# Patient Record
Sex: MALE | Race: WHITE | NOT HISPANIC OR LATINO | Employment: UNEMPLOYED | URBAN - NONMETROPOLITAN AREA
[De-identification: names, ages, dates, MRNs, and addresses within clinical notes are randomized per-mention and may not be internally consistent; named-entity substitution may affect disease eponyms.]

---

## 2022-05-10 ENCOUNTER — ANESTHESIA EVENT (OUTPATIENT)
Dept: PERIOP | Facility: HOSPITAL | Age: 57
End: 2022-05-10

## 2022-05-10 ENCOUNTER — APPOINTMENT (OUTPATIENT)
Dept: CT IMAGING | Facility: HOSPITAL | Age: 57
End: 2022-05-10

## 2022-05-10 ENCOUNTER — APPOINTMENT (OUTPATIENT)
Dept: GENERAL RADIOLOGY | Facility: HOSPITAL | Age: 57
End: 2022-05-10

## 2022-05-10 ENCOUNTER — ANESTHESIA (OUTPATIENT)
Dept: PERIOP | Facility: HOSPITAL | Age: 57
End: 2022-05-10

## 2022-05-10 ENCOUNTER — APPOINTMENT (OUTPATIENT)
Dept: ULTRASOUND IMAGING | Facility: HOSPITAL | Age: 57
End: 2022-05-10

## 2022-05-10 ENCOUNTER — HOSPITAL ENCOUNTER (INPATIENT)
Facility: HOSPITAL | Age: 57
LOS: 23 days | Discharge: NURSING, REHAB OR LONG-TERM CARE | End: 2022-06-02
Attending: STUDENT IN AN ORGANIZED HEALTH CARE EDUCATION/TRAINING PROGRAM | Admitting: PODIATRIST

## 2022-05-10 DIAGNOSIS — M86.072 ACUTE HEMATOGENOUS OSTEOMYELITIS OF LEFT FOOT: Primary | ICD-10-CM

## 2022-05-10 DIAGNOSIS — L03.119 CELLULITIS IN DIABETIC FOOT: ICD-10-CM

## 2022-05-10 DIAGNOSIS — E11.01 TYPE 2 DIABETES MELLITUS WITH HYPEROSMOLAR COMA, WITH LONG-TERM CURRENT USE OF INSULIN: ICD-10-CM

## 2022-05-10 DIAGNOSIS — Z79.4 TYPE 2 DIABETES MELLITUS WITH HYPEROSMOLAR COMA, WITH LONG-TERM CURRENT USE OF INSULIN: ICD-10-CM

## 2022-05-10 DIAGNOSIS — A48.0 GAS GANGRENE OF FOOT: ICD-10-CM

## 2022-05-10 DIAGNOSIS — E66.01 CLASS 2 SEVERE OBESITY DUE TO EXCESS CALORIES WITH SERIOUS COMORBIDITY AND BODY MASS INDEX (BMI) OF 36.0 TO 36.9 IN ADULT: ICD-10-CM

## 2022-05-10 DIAGNOSIS — E11.628 CELLULITIS IN DIABETIC FOOT: ICD-10-CM

## 2022-05-10 DIAGNOSIS — M86.172 OTHER ACUTE OSTEOMYELITIS OF LEFT FOOT: ICD-10-CM

## 2022-05-10 PROBLEM — E66.9 OBESITY: Status: ACTIVE | Noted: 2017-07-24

## 2022-05-10 PROBLEM — E11.9 TYPE 2 DIABETES MELLITUS: Status: ACTIVE | Noted: 2017-07-24

## 2022-05-10 PROBLEM — E78.5 HYPERLIPIDEMIA: Status: ACTIVE | Noted: 2017-07-24

## 2022-05-10 PROBLEM — I10 HYPERTENSIVE DISORDER: Status: ACTIVE | Noted: 2017-07-24

## 2022-05-10 PROBLEM — N28.9 KIDNEY DISEASE: Status: ACTIVE | Noted: 2017-07-24

## 2022-05-10 LAB
ALBUMIN SERPL-MCNC: 2.82 G/DL (ref 3.5–5.2)
ALBUMIN/GLOB SERPL: 0.6 G/DL
ALP SERPL-CCNC: 345 U/L (ref 39–117)
ALT SERPL W P-5'-P-CCNC: 36 U/L (ref 1–41)
ANION GAP SERPL CALCULATED.3IONS-SCNC: 17.1 MMOL/L (ref 5–15)
AST SERPL-CCNC: 50 U/L (ref 1–40)
BASOPHILS # BLD AUTO: 0.03 10*3/MM3 (ref 0–0.2)
BASOPHILS NFR BLD AUTO: 0.2 % (ref 0–1.5)
BILIRUB SERPL-MCNC: 1.1 MG/DL (ref 0–1.2)
BUN SERPL-MCNC: 21 MG/DL (ref 6–20)
BUN/CREAT SERPL: 21.9 (ref 7–25)
CALCIUM SPEC-SCNC: 8.7 MG/DL (ref 8.6–10.5)
CHLORIDE SERPL-SCNC: 88 MMOL/L (ref 98–107)
CO2 SERPL-SCNC: 21.9 MMOL/L (ref 22–29)
CREAT SERPL-MCNC: 0.96 MG/DL (ref 0.76–1.27)
CRP SERPL-MCNC: 21.83 MG/DL (ref 0–0.5)
D-LACTATE SERPL-SCNC: 1.3 MMOL/L (ref 0.5–2)
D-LACTATE SERPL-SCNC: 2.7 MMOL/L (ref 0.5–2)
D-LACTATE SERPL-SCNC: 3 MMOL/L (ref 0.5–2)
DEPRECATED RDW RBC AUTO: 40.7 FL (ref 37–54)
EGFRCR SERPLBLD CKD-EPI 2021: 92.2 ML/MIN/1.73
EOSINOPHIL # BLD AUTO: 0.04 10*3/MM3 (ref 0–0.4)
EOSINOPHIL NFR BLD AUTO: 0.3 % (ref 0.3–6.2)
ERYTHROCYTE [DISTWIDTH] IN BLOOD BY AUTOMATED COUNT: 14.8 % (ref 12.3–15.4)
ERYTHROCYTE [SEDIMENTATION RATE] IN BLOOD: 120 MM/HR (ref 0–20)
FLUAV SUBTYP SPEC NAA+PROBE: NOT DETECTED
FLUBV RNA ISLT QL NAA+PROBE: NOT DETECTED
GLOBULIN UR ELPH-MCNC: 4.8 GM/DL
GLUCOSE BLDC GLUCOMTR-MCNC: 311 MG/DL (ref 70–130)
GLUCOSE BLDC GLUCOMTR-MCNC: 325 MG/DL (ref 70–130)
GLUCOSE SERPL-MCNC: 451 MG/DL (ref 65–99)
HBA1C MFR BLD: 13 % (ref 4.8–5.6)
HCT VFR BLD AUTO: 37.7 % (ref 37.5–51)
HGB BLD-MCNC: 12.3 G/DL (ref 13–17.7)
IMM GRANULOCYTES # BLD AUTO: 0.13 10*3/MM3 (ref 0–0.05)
IMM GRANULOCYTES NFR BLD AUTO: 0.9 % (ref 0–0.5)
LYMPHOCYTES # BLD AUTO: 0.67 10*3/MM3 (ref 0.7–3.1)
LYMPHOCYTES NFR BLD AUTO: 4.4 % (ref 19.6–45.3)
MCH RBC QN AUTO: 25.1 PG (ref 26.6–33)
MCHC RBC AUTO-ENTMCNC: 32.6 G/DL (ref 31.5–35.7)
MCV RBC AUTO: 76.8 FL (ref 79–97)
MONOCYTES # BLD AUTO: 0.56 10*3/MM3 (ref 0.1–0.9)
MONOCYTES NFR BLD AUTO: 3.7 % (ref 5–12)
NEUTROPHILS NFR BLD AUTO: 13.66 10*3/MM3 (ref 1.7–7)
NEUTROPHILS NFR BLD AUTO: 90.5 % (ref 42.7–76)
NRBC BLD AUTO-RTO: 0 /100 WBC (ref 0–0.2)
PLATELET # BLD AUTO: 163 10*3/MM3 (ref 140–450)
PMV BLD AUTO: 10.3 FL (ref 6–12)
POTASSIUM SERPL-SCNC: 4.7 MMOL/L (ref 3.5–5.2)
PROT SERPL-MCNC: 7.6 G/DL (ref 6–8.5)
RBC # BLD AUTO: 4.91 10*6/MM3 (ref 4.14–5.8)
SARS-COV-2 RNA PNL SPEC NAA+PROBE: NOT DETECTED
SODIUM SERPL-SCNC: 127 MMOL/L (ref 136–145)
WBC NRBC COR # BLD: 15.09 10*3/MM3 (ref 3.4–10.8)

## 2022-05-10 PROCEDURE — 25010000002 FENTANYL CITRATE (PF) 50 MCG/ML SOLUTION: Performed by: NURSE ANESTHETIST, CERTIFIED REGISTERED

## 2022-05-10 PROCEDURE — 82962 GLUCOSE BLOOD TEST: CPT

## 2022-05-10 PROCEDURE — 0 LIDOCAINE 1 % SOLUTION 20 ML VIAL: Performed by: PODIATRIST

## 2022-05-10 PROCEDURE — 25010000002 PIPERACILLIN SOD-TAZOBACTAM PER 1 G: Performed by: PHYSICIAN ASSISTANT

## 2022-05-10 PROCEDURE — 99223 1ST HOSP IP/OBS HIGH 75: CPT | Performed by: PHYSICIAN ASSISTANT

## 2022-05-10 PROCEDURE — 87147 CULTURE TYPE IMMUNOLOGIC: CPT | Performed by: PHYSICIAN ASSISTANT

## 2022-05-10 PROCEDURE — 0J9R0ZZ DRAINAGE OF LEFT FOOT SUBCUTANEOUS TISSUE AND FASCIA, OPEN APPROACH: ICD-10-PCS | Performed by: PODIATRIST

## 2022-05-10 PROCEDURE — 99284 EMERGENCY DEPT VISIT MOD MDM: CPT

## 2022-05-10 PROCEDURE — 87147 CULTURE TYPE IMMUNOLOGIC: CPT | Performed by: PODIATRIST

## 2022-05-10 PROCEDURE — 93926 LOWER EXTREMITY STUDY: CPT

## 2022-05-10 PROCEDURE — 25010000002 VANCOMYCIN 1 G RECONSTITUTED SOLUTION: Performed by: PODIATRIST

## 2022-05-10 PROCEDURE — 87205 SMEAR GRAM STAIN: CPT | Performed by: PODIATRIST

## 2022-05-10 PROCEDURE — 25010000002 IOPAMIDOL 61 % SOLUTION: Performed by: STUDENT IN AN ORGANIZED HEALTH CARE EDUCATION/TRAINING PROGRAM

## 2022-05-10 PROCEDURE — 80053 COMPREHEN METABOLIC PANEL: CPT | Performed by: PHYSICIAN ASSISTANT

## 2022-05-10 PROCEDURE — 86140 C-REACTIVE PROTEIN: CPT | Performed by: PHYSICIAN ASSISTANT

## 2022-05-10 PROCEDURE — 83036 HEMOGLOBIN GLYCOSYLATED A1C: CPT | Performed by: HOSPITALIST

## 2022-05-10 PROCEDURE — 73630 X-RAY EXAM OF FOOT: CPT | Performed by: RADIOLOGY

## 2022-05-10 PROCEDURE — 73630 X-RAY EXAM OF FOOT: CPT

## 2022-05-10 PROCEDURE — 87186 SC STD MICRODIL/AGAR DIL: CPT | Performed by: PHYSICIAN ASSISTANT

## 2022-05-10 PROCEDURE — 87150 DNA/RNA AMPLIFIED PROBE: CPT | Performed by: PHYSICIAN ASSISTANT

## 2022-05-10 PROCEDURE — 85652 RBC SED RATE AUTOMATED: CPT | Performed by: PHYSICIAN ASSISTANT

## 2022-05-10 PROCEDURE — 63710000001 INSULIN REGULAR HUMAN PER 5 UNITS: Performed by: PHYSICIAN ASSISTANT

## 2022-05-10 PROCEDURE — 87070 CULTURE OTHR SPECIMN AEROBIC: CPT | Performed by: PODIATRIST

## 2022-05-10 PROCEDURE — 83605 ASSAY OF LACTIC ACID: CPT | Performed by: PHYSICIAN ASSISTANT

## 2022-05-10 PROCEDURE — 85025 COMPLETE CBC W/AUTO DIFF WBC: CPT | Performed by: PHYSICIAN ASSISTANT

## 2022-05-10 PROCEDURE — 25010000002 VANCOMYCIN 5 G RECONSTITUTED SOLUTION: Performed by: PHYSICIAN ASSISTANT

## 2022-05-10 PROCEDURE — 87636 SARSCOV2 & INF A&B AMP PRB: CPT | Performed by: PHYSICIAN ASSISTANT

## 2022-05-10 PROCEDURE — 25010000002 MIDAZOLAM PER 1 MG: Performed by: NURSE ANESTHETIST, CERTIFIED REGISTERED

## 2022-05-10 PROCEDURE — 87186 SC STD MICRODIL/AGAR DIL: CPT | Performed by: PODIATRIST

## 2022-05-10 PROCEDURE — 73701 CT LOWER EXTREMITY W/DYE: CPT

## 2022-05-10 RX ORDER — SODIUM CHLORIDE 9 MG/ML
100 INJECTION, SOLUTION INTRAVENOUS CONTINUOUS
Status: DISCONTINUED | OUTPATIENT
Start: 2022-05-11 | End: 2022-05-11

## 2022-05-10 RX ORDER — NICOTINE POLACRILEX 4 MG
15 LOZENGE BUCCAL
Status: DISCONTINUED | OUTPATIENT
Start: 2022-05-10 | End: 2022-05-29

## 2022-05-10 RX ORDER — HEPARIN SODIUM 5000 [USP'U]/ML
5000 INJECTION, SOLUTION INTRAVENOUS; SUBCUTANEOUS EVERY 12 HOURS SCHEDULED
Status: DISCONTINUED | OUTPATIENT
Start: 2022-05-11 | End: 2022-05-14

## 2022-05-10 RX ORDER — SODIUM CHLORIDE, SODIUM LACTATE, POTASSIUM CHLORIDE, CALCIUM CHLORIDE 600; 310; 30; 20 MG/100ML; MG/100ML; MG/100ML; MG/100ML
100 INJECTION, SOLUTION INTRAVENOUS ONCE AS NEEDED
Status: DISCONTINUED | OUTPATIENT
Start: 2022-05-10 | End: 2022-05-10 | Stop reason: HOSPADM

## 2022-05-10 RX ORDER — FENTANYL CITRATE 50 UG/ML
INJECTION, SOLUTION INTRAMUSCULAR; INTRAVENOUS AS NEEDED
Status: DISCONTINUED | OUTPATIENT
Start: 2022-05-10 | End: 2022-05-10 | Stop reason: SURG

## 2022-05-10 RX ORDER — HYDROCHLOROTHIAZIDE 12.5 MG/1
12.5 TABLET ORAL DAILY PRN
COMMUNITY
End: 2022-06-16 | Stop reason: HOSPADM

## 2022-05-10 RX ORDER — SODIUM CHLORIDE 0.9 % (FLUSH) 0.9 %
10 SYRINGE (ML) INJECTION EVERY 12 HOURS SCHEDULED
Status: DISCONTINUED | OUTPATIENT
Start: 2022-05-11 | End: 2022-06-02 | Stop reason: HOSPADM

## 2022-05-10 RX ORDER — DEXTROSE MONOHYDRATE 25 G/50ML
25 INJECTION, SOLUTION INTRAVENOUS
Status: DISCONTINUED | OUTPATIENT
Start: 2022-05-10 | End: 2022-05-29

## 2022-05-10 RX ORDER — SODIUM CHLORIDE 9 MG/ML
INJECTION, SOLUTION INTRAVENOUS CONTINUOUS PRN
Status: DISCONTINUED | OUTPATIENT
Start: 2022-05-10 | End: 2022-05-10 | Stop reason: SURG

## 2022-05-10 RX ORDER — HYDROCHLOROTHIAZIDE 12.5 MG/1
12.5 TABLET ORAL DAILY PRN
Status: CANCELLED | OUTPATIENT
Start: 2022-05-10

## 2022-05-10 RX ORDER — ASPIRIN 81 MG/1
81 TABLET ORAL DAILY
Status: CANCELLED | OUTPATIENT
Start: 2022-05-11

## 2022-05-10 RX ORDER — ONDANSETRON 2 MG/ML
4 INJECTION INTRAMUSCULAR; INTRAVENOUS AS NEEDED
Status: DISCONTINUED | OUTPATIENT
Start: 2022-05-10 | End: 2022-05-10 | Stop reason: HOSPADM

## 2022-05-10 RX ORDER — MIDAZOLAM HYDROCHLORIDE 1 MG/ML
INJECTION INTRAMUSCULAR; INTRAVENOUS AS NEEDED
Status: DISCONTINUED | OUTPATIENT
Start: 2022-05-10 | End: 2022-05-10 | Stop reason: SURG

## 2022-05-10 RX ORDER — MIDAZOLAM HYDROCHLORIDE 1 MG/ML
1 INJECTION INTRAMUSCULAR; INTRAVENOUS
Status: DISCONTINUED | OUTPATIENT
Start: 2022-05-10 | End: 2022-05-10 | Stop reason: HOSPADM

## 2022-05-10 RX ORDER — GLIPIZIDE 5 MG/1
10 TABLET ORAL
Status: CANCELLED | OUTPATIENT
Start: 2022-05-11

## 2022-05-10 RX ORDER — MEPERIDINE HYDROCHLORIDE 25 MG/ML
12.5 INJECTION INTRAMUSCULAR; INTRAVENOUS; SUBCUTANEOUS
Status: DISCONTINUED | OUTPATIENT
Start: 2022-05-10 | End: 2022-05-10 | Stop reason: HOSPADM

## 2022-05-10 RX ORDER — SODIUM CHLORIDE 0.9 % (FLUSH) 0.9 %
10 SYRINGE (ML) INJECTION AS NEEDED
Status: DISCONTINUED | OUTPATIENT
Start: 2022-05-10 | End: 2022-06-02 | Stop reason: HOSPADM

## 2022-05-10 RX ORDER — SODIUM CHLORIDE 0.9 % (FLUSH) 0.9 %
10 SYRINGE (ML) INJECTION EVERY 12 HOURS SCHEDULED
Status: DISCONTINUED | OUTPATIENT
Start: 2022-05-10 | End: 2022-05-10 | Stop reason: HOSPADM

## 2022-05-10 RX ORDER — ASPIRIN 81 MG/1
81 TABLET ORAL DAILY
Status: ON HOLD | COMMUNITY
End: 2022-06-02

## 2022-05-10 RX ORDER — SODIUM CHLORIDE, SODIUM LACTATE, POTASSIUM CHLORIDE, CALCIUM CHLORIDE 600; 310; 30; 20 MG/100ML; MG/100ML; MG/100ML; MG/100ML
125 INJECTION, SOLUTION INTRAVENOUS ONCE
Status: DISCONTINUED | OUTPATIENT
Start: 2022-05-10 | End: 2022-05-10 | Stop reason: HOSPADM

## 2022-05-10 RX ORDER — DIPHENOXYLATE HYDROCHLORIDE AND ATROPINE SULFATE 2.5; .025 MG/1; MG/1
1 TABLET ORAL DAILY
Status: DISCONTINUED | OUTPATIENT
Start: 2022-05-11 | End: 2022-06-02 | Stop reason: HOSPADM

## 2022-05-10 RX ORDER — KETOROLAC TROMETHAMINE 30 MG/ML
30 INJECTION, SOLUTION INTRAMUSCULAR; INTRAVENOUS EVERY 6 HOURS PRN
Status: DISCONTINUED | OUTPATIENT
Start: 2022-05-10 | End: 2022-05-10 | Stop reason: HOSPADM

## 2022-05-10 RX ORDER — LOSARTAN POTASSIUM 50 MG/1
100 TABLET ORAL DAILY
Status: CANCELLED | OUTPATIENT
Start: 2022-05-11

## 2022-05-10 RX ORDER — SODIUM CHLORIDE 0.9 % (FLUSH) 0.9 %
10 SYRINGE (ML) INJECTION AS NEEDED
Status: DISCONTINUED | OUTPATIENT
Start: 2022-05-10 | End: 2022-05-10 | Stop reason: HOSPADM

## 2022-05-10 RX ORDER — ATORVASTATIN CALCIUM 10 MG/1
10 TABLET, FILM COATED ORAL DAILY
Status: DISCONTINUED | OUTPATIENT
Start: 2022-05-11 | End: 2022-05-22

## 2022-05-10 RX ORDER — LOVASTATIN 10 MG/1
10 TABLET ORAL NIGHTLY
COMMUNITY
End: 2022-06-16 | Stop reason: HOSPADM

## 2022-05-10 RX ORDER — IPRATROPIUM BROMIDE AND ALBUTEROL SULFATE 2.5; .5 MG/3ML; MG/3ML
3 SOLUTION RESPIRATORY (INHALATION) ONCE AS NEEDED
Status: DISCONTINUED | OUTPATIENT
Start: 2022-05-10 | End: 2022-05-10 | Stop reason: HOSPADM

## 2022-05-10 RX ORDER — MAGNESIUM HYDROXIDE 1200 MG/15ML
LIQUID ORAL AS NEEDED
Status: DISCONTINUED | OUTPATIENT
Start: 2022-05-10 | End: 2022-05-10 | Stop reason: HOSPADM

## 2022-05-10 RX ORDER — GLIMEPIRIDE 4 MG/1
4 TABLET ORAL 2 TIMES DAILY
COMMUNITY
End: 2022-06-16 | Stop reason: HOSPADM

## 2022-05-10 RX ORDER — DIPHENOXYLATE HYDROCHLORIDE AND ATROPINE SULFATE 2.5; .025 MG/1; MG/1
1 TABLET ORAL DAILY
Status: ON HOLD | COMMUNITY
End: 2022-06-02

## 2022-05-10 RX ORDER — VANCOMYCIN HYDROCHLORIDE 1 G/20ML
INJECTION, POWDER, LYOPHILIZED, FOR SOLUTION INTRAVENOUS AS NEEDED
Status: DISCONTINUED | OUTPATIENT
Start: 2022-05-10 | End: 2022-05-10 | Stop reason: HOSPADM

## 2022-05-10 RX ORDER — INSULIN ASPART 100 [IU]/ML
0-14 INJECTION, SOLUTION INTRAVENOUS; SUBCUTANEOUS
Status: DISCONTINUED | OUTPATIENT
Start: 2022-05-11 | End: 2022-05-29

## 2022-05-10 RX ORDER — LOSARTAN POTASSIUM 100 MG/1
100 TABLET ORAL DAILY
Status: ON HOLD | COMMUNITY
End: 2022-06-02

## 2022-05-10 RX ADMIN — FENTANYL CITRATE 100 MCG: 50 INJECTION INTRAMUSCULAR; INTRAVENOUS at 21:19

## 2022-05-10 RX ADMIN — VANCOMYCIN HYDROCHLORIDE 2000 MG: 5 INJECTION, POWDER, LYOPHILIZED, FOR SOLUTION INTRAVENOUS at 19:30

## 2022-05-10 RX ADMIN — IOPAMIDOL 87 ML: 612 INJECTION, SOLUTION INTRAVENOUS at 17:27

## 2022-05-10 RX ADMIN — MIDAZOLAM 2 MG: 1 INJECTION INTRAMUSCULAR; INTRAVENOUS at 21:14

## 2022-05-10 RX ADMIN — PIPERACILLIN SODIUM AND TAZOBACTAM SODIUM 4.5 G: 4; .5 INJECTION, POWDER, LYOPHILIZED, FOR SOLUTION INTRAVENOUS at 18:40

## 2022-05-10 RX ADMIN — MIDAZOLAM 2 MG: 1 INJECTION INTRAMUSCULAR; INTRAVENOUS at 21:01

## 2022-05-10 RX ADMIN — HUMAN INSULIN 5 UNITS: 100 INJECTION, SOLUTION SUBCUTANEOUS at 17:30

## 2022-05-10 RX ADMIN — SODIUM CHLORIDE 2190 ML: 9 INJECTION, SOLUTION INTRAVENOUS at 17:30

## 2022-05-10 RX ADMIN — SODIUM CHLORIDE: 9 INJECTION, SOLUTION INTRAVENOUS at 21:01

## 2022-05-11 PROBLEM — M86.9 OSTEOMYELITIS: Status: ACTIVE | Noted: 2022-05-11

## 2022-05-11 LAB
ACETONE BLD QL: NEGATIVE
ALBUMIN SERPL-MCNC: 2.31 G/DL (ref 3.5–5.2)
ALBUMIN/GLOB SERPL: 0.6 G/DL
ALP SERPL-CCNC: 259 U/L (ref 39–117)
ALT SERPL W P-5'-P-CCNC: 27 U/L (ref 1–41)
AMPHET+METHAMPHET UR QL: NEGATIVE
AMPHETAMINES UR QL: NEGATIVE
ANION GAP SERPL CALCULATED.3IONS-SCNC: 12.6 MMOL/L (ref 5–15)
AST SERPL-CCNC: 35 U/L (ref 1–40)
ATMOSPHERIC PRESS: 731 MMHG
BACTERIA BLD CULT: ABNORMAL
BACTERIA UR QL AUTO: ABNORMAL /HPF
BARBITURATES UR QL SCN: NEGATIVE
BASE EXCESS BLDV CALC-SCNC: 1.5 MMOL/L (ref 0–2)
BASOPHILS # BLD AUTO: 0.02 10*3/MM3 (ref 0–0.2)
BASOPHILS NFR BLD AUTO: 0.2 % (ref 0–1.5)
BDY SITE: ABNORMAL
BENZODIAZ UR QL SCN: NEGATIVE
BILIRUB SERPL-MCNC: 1 MG/DL (ref 0–1.2)
BILIRUB UR QL STRIP: NEGATIVE
BODY TEMPERATURE: 37 C
BOTTLE TYPE: ABNORMAL
BUN SERPL-MCNC: 16 MG/DL (ref 6–20)
BUN/CREAT SERPL: 21.1 (ref 7–25)
BUPRENORPHINE SERPL-MCNC: NEGATIVE NG/ML
CALCIUM SPEC-SCNC: 8.2 MG/DL (ref 8.6–10.5)
CANNABINOIDS SERPL QL: NEGATIVE
CHLORIDE SERPL-SCNC: 97 MMOL/L (ref 98–107)
CLARITY UR: CLEAR
CO2 BLDA-SCNC: 27.4 MMOL/L (ref 22–33)
CO2 SERPL-SCNC: 22.4 MMOL/L (ref 22–29)
COCAINE UR QL: NEGATIVE
COHGB MFR BLD: 1.5 % (ref 0–5)
COLOR UR: YELLOW
CREAT SERPL-MCNC: 0.76 MG/DL (ref 0.76–1.27)
CRP SERPL-MCNC: 18.62 MG/DL (ref 0–0.5)
DEPRECATED RDW RBC AUTO: 41.2 FL (ref 37–54)
EGFRCR SERPLBLD CKD-EPI 2021: 104.8 ML/MIN/1.73
EOSINOPHIL # BLD AUTO: 0.09 10*3/MM3 (ref 0–0.4)
EOSINOPHIL NFR BLD AUTO: 0.7 % (ref 0.3–6.2)
ERYTHROCYTE [DISTWIDTH] IN BLOOD BY AUTOMATED COUNT: 14.8 % (ref 12.3–15.4)
ETHANOL BLD-MCNC: <10 MG/DL (ref 0–10)
ETHANOL UR QL: <0.01 %
FERRITIN SERPL-MCNC: 367.8 NG/ML (ref 30–400)
GLOBULIN UR ELPH-MCNC: 3.9 GM/DL
GLUCOSE BLDC GLUCOMTR-MCNC: 226 MG/DL (ref 70–130)
GLUCOSE BLDC GLUCOMTR-MCNC: 248 MG/DL (ref 70–130)
GLUCOSE BLDC GLUCOMTR-MCNC: 254 MG/DL (ref 70–130)
GLUCOSE BLDC GLUCOMTR-MCNC: 268 MG/DL (ref 70–130)
GLUCOSE BLDC GLUCOMTR-MCNC: 295 MG/DL (ref 70–130)
GLUCOSE SERPL-MCNC: 277 MG/DL (ref 65–99)
GLUCOSE UR STRIP-MCNC: ABNORMAL MG/DL
HAV IGM SERPL QL IA: NORMAL
HBV CORE IGM SERPL QL IA: NORMAL
HBV SURFACE AG SERPL QL IA: NORMAL
HCO3 BLDV-SCNC: 26.1 MMOL/L (ref 22–28)
HCT VFR BLD AUTO: 33.2 % (ref 37.5–51)
HCV AB SER DONR QL: NORMAL
HGB BLD-MCNC: 10.9 G/DL (ref 13–17.7)
HGB BLDA-MCNC: 11.2 G/DL (ref 14–18)
HGB UR QL STRIP.AUTO: ABNORMAL
HYALINE CASTS UR QL AUTO: ABNORMAL /LPF
IMM GRANULOCYTES # BLD AUTO: 0.07 10*3/MM3 (ref 0–0.05)
IMM GRANULOCYTES NFR BLD AUTO: 0.6 % (ref 0–0.5)
INHALED O2 CONCENTRATION: 21 %
IRON 24H UR-MRATE: 60 MCG/DL (ref 59–158)
IRON 24H UR-MRATE: 60 MCG/DL (ref 59–158)
IRON SATN MFR SERPL: 25 % (ref 20–50)
KETONES UR QL STRIP: ABNORMAL
LEUKOCYTE ESTERASE UR QL STRIP.AUTO: NEGATIVE
LYMPHOCYTES # BLD AUTO: 0.85 10*3/MM3 (ref 0.7–3.1)
LYMPHOCYTES NFR BLD AUTO: 7.1 % (ref 19.6–45.3)
Lab: ABNORMAL
MAGNESIUM SERPL-MCNC: 1.6 MG/DL (ref 1.6–2.6)
MCH RBC QN AUTO: 25.3 PG (ref 26.6–33)
MCHC RBC AUTO-ENTMCNC: 32.8 G/DL (ref 31.5–35.7)
MCV RBC AUTO: 77.2 FL (ref 79–97)
METHADONE UR QL SCN: NEGATIVE
METHGB BLD QL: 0.1 % (ref 0–3)
MODALITY: ABNORMAL
MONOCYTES # BLD AUTO: 0.58 10*3/MM3 (ref 0.1–0.9)
MONOCYTES NFR BLD AUTO: 4.8 % (ref 5–12)
NEUTROPHILS NFR BLD AUTO: 10.42 10*3/MM3 (ref 1.7–7)
NEUTROPHILS NFR BLD AUTO: 86.6 % (ref 42.7–76)
NITRITE UR QL STRIP: NEGATIVE
NRBC BLD AUTO-RTO: 0 /100 WBC (ref 0–0.2)
OPIATES UR QL: NEGATIVE
OXYCODONE UR QL SCN: NEGATIVE
OXYHGB MFR BLDV: 52 % (ref 45–75)
PCO2 BLDV: 40.6 MM HG (ref 41–51)
PCP UR QL SCN: NEGATIVE
PH BLDV: 7.42 PH UNITS (ref 7.32–7.42)
PH UR STRIP.AUTO: 5.5 [PH] (ref 5–8)
PLATELET # BLD AUTO: 138 10*3/MM3 (ref 140–450)
PMV BLD AUTO: 9.7 FL (ref 6–12)
PO2 BLDV: 27.9 MM HG (ref 27–53)
POTASSIUM SERPL-SCNC: 4.2 MMOL/L (ref 3.5–5.2)
PROPOXYPH UR QL: NEGATIVE
PROT SERPL-MCNC: 6.2 G/DL (ref 6–8.5)
PROT UR QL STRIP: NEGATIVE
QT INTERVAL: 362 MS
QTC INTERVAL: 476 MS
RBC # BLD AUTO: 4.3 10*6/MM3 (ref 4.14–5.8)
RBC # UR STRIP: ABNORMAL /HPF
REF LAB TEST METHOD: ABNORMAL
SALICYLATES SERPL-MCNC: <0.3 MG/DL
SAO2 % BLDCOV: 52.8 % (ref 45–75)
SODIUM SERPL-SCNC: 132 MMOL/L (ref 136–145)
SP GR UR STRIP: >=1.03 (ref 1–1.03)
SQUAMOUS #/AREA URNS HPF: ABNORMAL /HPF
TIBC SERPL-MCNC: 235 MCG/DL (ref 298–536)
TRANSFERRIN SERPL-MCNC: 158 MG/DL (ref 200–360)
TRICYCLICS UR QL SCN: NEGATIVE
UROBILINOGEN UR QL STRIP: ABNORMAL
VENTILATOR MODE: ABNORMAL
WBC # UR STRIP: ABNORMAL /HPF
WBC NRBC COR # BLD: 12.03 10*3/MM3 (ref 3.4–10.8)

## 2022-05-11 PROCEDURE — 80306 DRUG TEST PRSMV INSTRMNT: CPT | Performed by: PHYSICIAN ASSISTANT

## 2022-05-11 PROCEDURE — 82728 ASSAY OF FERRITIN: CPT | Performed by: PHYSICIAN ASSISTANT

## 2022-05-11 PROCEDURE — 25010000002 PIPERACILLIN SOD-TAZOBACTAM PER 1 G: Performed by: HOSPITALIST

## 2022-05-11 PROCEDURE — 82009 KETONE BODYS QUAL: CPT | Performed by: HOSPITALIST

## 2022-05-11 PROCEDURE — 80179 DRUG ASSAY SALICYLATE: CPT | Performed by: PHYSICIAN ASSISTANT

## 2022-05-11 PROCEDURE — 82962 GLUCOSE BLOOD TEST: CPT

## 2022-05-11 PROCEDURE — 84466 ASSAY OF TRANSFERRIN: CPT | Performed by: PHYSICIAN ASSISTANT

## 2022-05-11 PROCEDURE — 83735 ASSAY OF MAGNESIUM: CPT | Performed by: PHYSICIAN ASSISTANT

## 2022-05-11 PROCEDURE — 82077 ASSAY SPEC XCP UR&BREATH IA: CPT | Performed by: PHYSICIAN ASSISTANT

## 2022-05-11 PROCEDURE — 82607 VITAMIN B-12: CPT | Performed by: PHYSICIAN ASSISTANT

## 2022-05-11 PROCEDURE — 82746 ASSAY OF FOLIC ACID SERUM: CPT | Performed by: PHYSICIAN ASSISTANT

## 2022-05-11 PROCEDURE — 82820 HEMOGLOBIN-OXYGEN AFFINITY: CPT

## 2022-05-11 PROCEDURE — 81001 URINALYSIS AUTO W/SCOPE: CPT | Performed by: HOSPITALIST

## 2022-05-11 PROCEDURE — 83540 ASSAY OF IRON: CPT | Performed by: PHYSICIAN ASSISTANT

## 2022-05-11 PROCEDURE — 82977 ASSAY OF GGT: CPT | Performed by: PHYSICIAN ASSISTANT

## 2022-05-11 PROCEDURE — 25010000002 HEPARIN (PORCINE) PER 1000 UNITS: Performed by: HOSPITALIST

## 2022-05-11 PROCEDURE — 85025 COMPLETE CBC W/AUTO DIFF WBC: CPT | Performed by: HOSPITALIST

## 2022-05-11 PROCEDURE — 63710000001 INSULIN ASPART PER 5 UNITS: Performed by: HOSPITALIST

## 2022-05-11 PROCEDURE — 80074 ACUTE HEPATITIS PANEL: CPT | Performed by: PHYSICIAN ASSISTANT

## 2022-05-11 PROCEDURE — 86140 C-REACTIVE PROTEIN: CPT | Performed by: HOSPITALIST

## 2022-05-11 PROCEDURE — 82805 BLOOD GASES W/O2 SATURATION: CPT

## 2022-05-11 PROCEDURE — 80053 COMPREHEN METABOLIC PANEL: CPT | Performed by: HOSPITALIST

## 2022-05-11 PROCEDURE — 93005 ELECTROCARDIOGRAM TRACING: CPT | Performed by: PHYSICIAN ASSISTANT

## 2022-05-11 PROCEDURE — 25010000002 CEFEPIME PER 500 MG: Performed by: INTERNAL MEDICINE

## 2022-05-11 PROCEDURE — 25010000002 VANCOMYCIN 5 G RECONSTITUTED SOLUTION: Performed by: HOSPITALIST

## 2022-05-11 RX ORDER — INSULIN ASPART 100 [IU]/ML
5 INJECTION, SOLUTION INTRAVENOUS; SUBCUTANEOUS ONCE
Status: DISCONTINUED | OUTPATIENT
Start: 2022-05-11 | End: 2022-05-11

## 2022-05-11 RX ORDER — PANTOPRAZOLE SODIUM 40 MG/1
40 TABLET, DELAYED RELEASE ORAL
Status: DISCONTINUED | OUTPATIENT
Start: 2022-05-11 | End: 2022-06-02 | Stop reason: HOSPADM

## 2022-05-11 RX ORDER — SODIUM HYPOCHLORITE 1.25 MG/ML
SOLUTION TOPICAL 2 TIMES DAILY
Status: DISCONTINUED | OUTPATIENT
Start: 2022-05-11 | End: 2022-05-19

## 2022-05-11 RX ORDER — CLINDAMYCIN PHOSPHATE 900 MG/50ML
900 INJECTION INTRAVENOUS EVERY 8 HOURS
Status: COMPLETED | OUTPATIENT
Start: 2022-05-11 | End: 2022-05-13

## 2022-05-11 RX ORDER — INSULIN ASPART 100 [IU]/ML
10 INJECTION, SOLUTION INTRAVENOUS; SUBCUTANEOUS ONCE
Status: DISCONTINUED | OUTPATIENT
Start: 2022-05-11 | End: 2022-05-11

## 2022-05-11 RX ORDER — HYDROCODONE BITARTRATE AND ACETAMINOPHEN 7.5; 325 MG/1; MG/1
1 TABLET ORAL EVERY 6 HOURS PRN
Status: DISPENSED | OUTPATIENT
Start: 2022-05-11 | End: 2022-05-18

## 2022-05-11 RX ADMIN — ATORVASTATIN CALCIUM 10 MG: 10 TABLET, FILM COATED ORAL at 08:18

## 2022-05-11 RX ADMIN — Medication 1 TABLET: at 08:17

## 2022-05-11 RX ADMIN — HEPARIN SODIUM 5000 UNITS: 5000 INJECTION INTRAVENOUS; SUBCUTANEOUS at 00:39

## 2022-05-11 RX ADMIN — SODIUM CHLORIDE 100 ML/HR: 9 INJECTION, SOLUTION INTRAVENOUS at 00:39

## 2022-05-11 RX ADMIN — HYDROCODONE BITARTRATE AND ACETAMINOPHEN 1 TABLET: 7.5; 325 TABLET ORAL at 00:38

## 2022-05-11 RX ADMIN — PIPERACILLIN SODIUM AND TAZOBACTAM SODIUM 3.38 G: 3; .375 INJECTION, POWDER, LYOPHILIZED, FOR SOLUTION INTRAVENOUS at 00:38

## 2022-05-11 RX ADMIN — VANCOMYCIN HYDROCHLORIDE 1500 MG: 5 INJECTION, POWDER, LYOPHILIZED, FOR SOLUTION INTRAVENOUS at 08:15

## 2022-05-11 RX ADMIN — CEFEPIME HYDROCHLORIDE 2 G: 2 INJECTION, POWDER, FOR SOLUTION INTRAVENOUS at 22:20

## 2022-05-11 RX ADMIN — HEPARIN SODIUM 5000 UNITS: 5000 INJECTION INTRAVENOUS; SUBCUTANEOUS at 08:28

## 2022-05-11 RX ADMIN — CLINDAMYCIN IN 5 PERCENT DEXTROSE 900 MG: 18 INJECTION, SOLUTION INTRAVENOUS at 22:19

## 2022-05-11 RX ADMIN — INSULIN ASPART 8 UNITS: 100 INJECTION, SOLUTION INTRAVENOUS; SUBCUTANEOUS at 12:01

## 2022-05-11 RX ADMIN — PIPERACILLIN SODIUM AND TAZOBACTAM SODIUM 3.38 G: 3; .375 INJECTION, POWDER, LYOPHILIZED, FOR SOLUTION INTRAVENOUS at 10:22

## 2022-05-11 RX ADMIN — INSULIN ASPART 5 UNITS: 100 INJECTION, SOLUTION INTRAVENOUS; SUBCUTANEOUS at 17:28

## 2022-05-11 RX ADMIN — PANTOPRAZOLE SODIUM 40 MG: 40 TABLET, DELAYED RELEASE ORAL at 05:59

## 2022-05-11 RX ADMIN — Medication 10 ML: at 08:20

## 2022-05-11 RX ADMIN — Medication 10 ML: at 00:41

## 2022-05-11 RX ADMIN — HEPARIN SODIUM 5000 UNITS: 5000 INJECTION INTRAVENOUS; SUBCUTANEOUS at 22:19

## 2022-05-11 RX ADMIN — VANCOMYCIN HYDROCHLORIDE 1500 MG: 5 INJECTION, POWDER, LYOPHILIZED, FOR SOLUTION INTRAVENOUS at 23:25

## 2022-05-11 RX ADMIN — Medication 10 ML: at 22:20

## 2022-05-11 RX ADMIN — CLINDAMYCIN IN 5 PERCENT DEXTROSE 900 MG: 18 INJECTION, SOLUTION INTRAVENOUS at 14:43

## 2022-05-11 RX ADMIN — DAKIN'S SOLUTION 0.125% (QUARTER STRENGTH): 0.12 SOLUTION at 22:20

## 2022-05-11 RX ADMIN — CEFEPIME HYDROCHLORIDE 2 G: 2 INJECTION, POWDER, FOR SOLUTION INTRAVENOUS at 13:08

## 2022-05-11 RX ADMIN — INSULIN ASPART 5 UNITS: 100 INJECTION, SOLUTION INTRAVENOUS; SUBCUTANEOUS at 08:15

## 2022-05-11 NOTE — ANESTHESIA PREPROCEDURE EVALUATION
Anesthesia Evaluation     Patient summary reviewed and Nursing notes reviewed   no history of anesthetic complications:  NPO Solid Status: > 8 hours  NPO Liquid Status: > 8 hours           Airway   Dental      Pulmonary - negative pulmonary ROS   Cardiovascular     (+) hypertension, hyperlipidemia,       Neuro/Psych- negative ROS  GI/Hepatic/Renal/Endo    (+) obesity, morbid obesity,  renal disease CRI, diabetes mellitus type 2 poorly controlled,     Musculoskeletal (-) negative ROS    Abdominal    Substance History   (+) alcohol use,      OB/GYN negative ob/gyn ROS         Other - negative ROS                    Results:    Results from last 7 days   Lab Units 05/10/22  1550   WBC 10*3/mm3 15.09*     Lab Results   Component Value Date    NEUTROABS 13.66 (H) 05/10/2022       Results from last 7 days   Lab Units 05/10/22  1550   CREATININE mg/dL 0.96       Results from last 7 days   Lab Units 05/10/22  1550   CRP mg/dL 21.83*       Imaging Results (Last 24 Hours)     ** No results found for the last 24 hours. **              PROBLEM LIST:    Patient Active Problem List   Diagnosis   • Hyperlipidemia   • Hypertensive disorder   • Kidney disease   • Obesity   • Type 2 diabetes mellitus (HCC)   • Gas gangrene of foot (HCC)   • Cellulitis in diabetic foot (HCC)     Per Podiatry consult:   CT changes of gas gangrene/emphysema soft tissue will need emergent I and D with Deep cutlure, drain placement. IV antibiotic broad spectrum until culture returns.Infectious Disease Consult. CBC, CRP and SED, Blood cultures. Will follow. Prognosis gaurded.       Anesthesia Plan    ASA 3 - emergent     general     intravenous induction     Anesthetic plan, all risks, benefits, and alternatives have been provided, discussed and informed consent has been obtained with: patient.  Use of blood products discussed with patient  Consented to blood products.       CODE STATUS:      FULL    Lab Results   Component Value Date    WBC 15.09 (H)  05/10/2022    HGB 12.3 (L) 05/10/2022    HCT 37.7 05/10/2022    MCV 76.8 (L) 05/10/2022     05/10/2022       Lab Results   Component Value Date    GLUCOSE 451 (C) 05/10/2022    BUN 21 (H) 05/10/2022    CREATININE 0.96 05/10/2022    BCR 21.9 05/10/2022    K 4.7 05/10/2022    CO2 21.9 (L) 05/10/2022    CALCIUM 8.7 05/10/2022    ALBUMIN 2.82 (L) 05/10/2022    AST 50 (H) 05/10/2022    ALT 36 05/10/2022

## 2022-05-11 NOTE — ANESTHESIA POSTPROCEDURE EVALUATION
Patient: Melchor Hardwick    Procedure Summary     Date: 05/10/22 Room / Location: [facility] OR 01 /  [facility] OR    Anesthesia Start: 2101 Anesthesia Stop: 2134    Procedure: INCISION AND DRAINAGE LOWER EXTREMITY (Left ) Diagnosis:       Type 2 diabetes mellitus with hyperosmolar coma, with long-term current use of insulin (HCC)      Gas gangrene of foot (HCC)      Cellulitis in diabetic foot (HCC)      (Type 2 diabetes mellitus with hyperosmolar coma, with long-term current use of insulin ([facility]) [E11.01, Z79.4])      (Gas gangrene of foot (HCC) [A48.0])      (Cellulitis in diabetic foot (HCC) [E11.628, L03.119])    Surgeons: Addison Colby MD Provider: Tom Luong MD    Anesthesia Type: general ASA Status: 3 - Emergent          Anesthesia Type: general    Vitals  No vitals data found for the desired time range.          Post Anesthesia Care and Evaluation    Patient location during evaluation: PACU  Patient participation: complete - patient participated  Level of consciousness: awake  Pain score: 0  Pain management: adequate  Airway patency: patent  Anesthetic complications: No anesthetic complications  PONV Status: none  Cardiovascular status: acceptable  Respiratory status: acceptable  Hydration status: acceptable

## 2022-05-11 NOTE — ANESTHESIA PREPROCEDURE EVALUATION
Anesthesia Evaluation     Patient summary reviewed and Nursing notes reviewed   no history of anesthetic complications:  NPO Solid Status: > 8 hours  NPO Liquid Status: > 8 hours           Airway   Mallampati: III  TM distance: >3 FB  Neck ROM: full  Dental    (+) poor dentition    Pulmonary - negative pulmonary ROS and normal exam   Cardiovascular - normal exam    (+) hypertension, hyperlipidemia,       Neuro/Psych- negative ROS  GI/Hepatic/Renal/Endo    (+) obesity, morbid obesity,  renal disease CRI, diabetes mellitus type 2 poorly controlled using insulin,     Musculoskeletal (-) negative ROS    Abdominal   (+) obese,    Substance History - negative use     OB/GYN negative ob/gyn ROS         Other - negative ROS                       Anesthesia Plan    ASA 3 - emergent     general     intravenous induction     Anesthetic plan, all risks, benefits, and alternatives have been provided, discussed and informed consent has been obtained with: patient.  Use of blood products discussed with patient  Consented to blood products.       CODE STATUS:      FULL      Lab Results   Component Value Date    WBC 15.09 (H) 05/10/2022    HGB 12.3 (L) 05/10/2022    HCT 37.7 05/10/2022    MCV 76.8 (L) 05/10/2022     05/10/2022       Lab Results   Component Value Date    GLUCOSE 451 (C) 05/10/2022    BUN 21 (H) 05/10/2022    CREATININE 0.96 05/10/2022    BCR 21.9 05/10/2022    K 4.7 05/10/2022    CO2 21.9 (L) 05/10/2022    CALCIUM 8.7 05/10/2022    ALBUMIN 2.82 (L) 05/10/2022    AST 50 (H) 05/10/2022    ALT 36 05/10/2022

## 2022-05-12 ENCOUNTER — APPOINTMENT (OUTPATIENT)
Dept: CARDIOLOGY | Facility: HOSPITAL | Age: 57
End: 2022-05-12

## 2022-05-12 LAB
ALBUMIN SERPL-MCNC: 1.87 G/DL (ref 3.5–5.2)
ALP SERPL-CCNC: 217 U/L (ref 39–117)
ALT SERPL W P-5'-P-CCNC: 24 U/L (ref 1–41)
ANION GAP SERPL CALCULATED.3IONS-SCNC: 9.7 MMOL/L (ref 5–15)
AST SERPL-CCNC: 32 U/L (ref 1–40)
BH CV ECHO MEAS - ACS: 1.93 CM
BH CV ECHO MEAS - AO MAX PG: 7.3 MMHG
BH CV ECHO MEAS - AO MEAN PG: 5 MMHG
BH CV ECHO MEAS - AO ROOT DIAM: 3.5 CM
BH CV ECHO MEAS - AO V2 MAX: 135 CM/SEC
BH CV ECHO MEAS - AO V2 VTI: 27.8 CM
BH CV ECHO MEAS - EDV(CUBED): 166.4 ML
BH CV ECHO MEAS - EDV(MOD-SP2): 48.8 ML
BH CV ECHO MEAS - EDV(MOD-SP4): 78 ML
BH CV ECHO MEAS - EF(MOD-BP): 57.4 %
BH CV ECHO MEAS - EF(MOD-SP2): 55.3 %
BH CV ECHO MEAS - EF(MOD-SP4): 59.4 %
BH CV ECHO MEAS - ESV(CUBED): 29.8 ML
BH CV ECHO MEAS - ESV(MOD-SP2): 21.8 ML
BH CV ECHO MEAS - ESV(MOD-SP4): 31.7 ML
BH CV ECHO MEAS - FS: 43.6 %
BH CV ECHO MEAS - IVS/LVPW: 1 CM
BH CV ECHO MEAS - IVSD: 0.9 CM
BH CV ECHO MEAS - LA DIMENSION: 5 CM
BH CV ECHO MEAS - LAT PEAK E' VEL: 7.3 CM/SEC
BH CV ECHO MEAS - LV DIASTOLIC VOL/BSA (35-75): 35.7 CM2
BH CV ECHO MEAS - LV MASS(C)D: 185.8 GRAMS
BH CV ECHO MEAS - LV MAX PG: 5.8 MMHG
BH CV ECHO MEAS - LV MEAN PG: 3 MMHG
BH CV ECHO MEAS - LV SYSTOLIC VOL/BSA (12-30): 14.5 CM2
BH CV ECHO MEAS - LV V1 MAX: 120 CM/SEC
BH CV ECHO MEAS - LV V1 VTI: 22.3 CM
BH CV ECHO MEAS - LVIDD: 5.5 CM
BH CV ECHO MEAS - LVIDS: 3.1 CM
BH CV ECHO MEAS - LVPWD: 0.9 CM
BH CV ECHO MEAS - MED PEAK E' VEL: 9 CM/SEC
BH CV ECHO MEAS - MR MAX PG: 51.3 MMHG
BH CV ECHO MEAS - MR MAX VEL: 358 CM/SEC
BH CV ECHO MEAS - MV A MAX VEL: 94.6 CM/SEC
BH CV ECHO MEAS - MV DEC SLOPE: 466 CM/SEC2
BH CV ECHO MEAS - MV DEC TIME: 0.22 MSEC
BH CV ECHO MEAS - MV E MAX VEL: 100 CM/SEC
BH CV ECHO MEAS - MV E/A: 1.06
BH CV ECHO MEAS - MV MAX PG: 4.5 MMHG
BH CV ECHO MEAS - MV MEAN PG: 2 MMHG
BH CV ECHO MEAS - MV V2 VTI: 25.5 CM
BH CV ECHO MEAS - PA ACC TIME: 0.1 SEC
BH CV ECHO MEAS - PA PR(ACCEL): 34.4 MMHG
BH CV ECHO MEAS - PA V2 MAX: 112 CM/SEC
BH CV ECHO MEAS - RAP SYSTOLE: 10 MMHG
BH CV ECHO MEAS - RVSP: 27.5 MMHG
BH CV ECHO MEAS - SI(MOD-SP2): 12.4 ML/M2
BH CV ECHO MEAS - SI(MOD-SP4): 21.2 ML/M2
BH CV ECHO MEAS - SV(MOD-SP2): 27 ML
BH CV ECHO MEAS - SV(MOD-SP4): 46.3 ML
BH CV ECHO MEAS - TAPSE (>1.6): 2.7 CM
BH CV ECHO MEAS - TR MAX PG: 17.5 MMHG
BH CV ECHO MEAS - TR MAX VEL: 209 CM/SEC
BH CV ECHO MEASUREMENTS AVERAGE E/E' RATIO: 12.27
BILIRUB CONJ SERPL-MCNC: 0.3 MG/DL (ref 0–0.3)
BILIRUB INDIRECT SERPL-MCNC: 0.3 MG/DL
BILIRUB SERPL-MCNC: 0.6 MG/DL (ref 0–1.2)
BUN SERPL-MCNC: 14 MG/DL (ref 6–20)
BUN/CREAT SERPL: 22.2 (ref 7–25)
CALCIUM SPEC-SCNC: 7.7 MG/DL (ref 8.6–10.5)
CHLORIDE SERPL-SCNC: 96 MMOL/L (ref 98–107)
CO2 SERPL-SCNC: 20.3 MMOL/L (ref 22–29)
CREAT SERPL-MCNC: 0.63 MG/DL (ref 0.76–1.27)
DEPRECATED RDW RBC AUTO: 41.7 FL (ref 37–54)
EGFRCR SERPLBLD CKD-EPI 2021: 110.9 ML/MIN/1.73
ERYTHROCYTE [DISTWIDTH] IN BLOOD BY AUTOMATED COUNT: 14.7 % (ref 12.3–15.4)
FOLATE SERPL-MCNC: 6.32 NG/ML (ref 4.78–24.2)
GGT SERPL-CCNC: 211 U/L (ref 8–61)
GLUCOSE BLDC GLUCOMTR-MCNC: 207 MG/DL (ref 70–130)
GLUCOSE BLDC GLUCOMTR-MCNC: 272 MG/DL (ref 70–130)
GLUCOSE BLDC GLUCOMTR-MCNC: 289 MG/DL (ref 70–130)
GLUCOSE BLDC GLUCOMTR-MCNC: 290 MG/DL (ref 70–130)
GLUCOSE SERPL-MCNC: 257 MG/DL (ref 65–99)
HCT VFR BLD AUTO: 30 % (ref 37.5–51)
HGB BLD-MCNC: 9.7 G/DL (ref 13–17.7)
LEFT ATRIUM VOLUME INDEX: 24.9 ML/M2
MAXIMAL PREDICTED HEART RATE: 163 BPM
MCH RBC QN AUTO: 25.3 PG (ref 26.6–33)
MCHC RBC AUTO-ENTMCNC: 32.3 G/DL (ref 31.5–35.7)
MCV RBC AUTO: 78.3 FL (ref 79–97)
PLATELET # BLD AUTO: 110 10*3/MM3 (ref 140–450)
PMV BLD AUTO: 9.9 FL (ref 6–12)
POTASSIUM SERPL-SCNC: 4.1 MMOL/L (ref 3.5–5.2)
PROT SERPL-MCNC: 5.7 G/DL (ref 6–8.5)
RBC # BLD AUTO: 3.83 10*6/MM3 (ref 4.14–5.8)
SODIUM SERPL-SCNC: 126 MMOL/L (ref 136–145)
STRESS TARGET HR: 139 BPM
VANCOMYCIN TROUGH SERPL-MCNC: 10 MCG/ML (ref 5–20)
VIT B12 BLD-MCNC: >2000 PG/ML (ref 211–946)
WBC NRBC COR # BLD: 7.3 10*3/MM3 (ref 3.4–10.8)

## 2022-05-12 PROCEDURE — 63710000001 INSULIN ASPART PER 5 UNITS: Performed by: HOSPITALIST

## 2022-05-12 PROCEDURE — 85027 COMPLETE CBC AUTOMATED: CPT | Performed by: INTERNAL MEDICINE

## 2022-05-12 PROCEDURE — 25010000002 HEPARIN (PORCINE) PER 1000 UNITS: Performed by: HOSPITALIST

## 2022-05-12 PROCEDURE — 82962 GLUCOSE BLOOD TEST: CPT

## 2022-05-12 PROCEDURE — 80076 HEPATIC FUNCTION PANEL: CPT | Performed by: INTERNAL MEDICINE

## 2022-05-12 PROCEDURE — 25010000002 MAGNESIUM SULFATE 2 GM/50ML SOLUTION: Performed by: INTERNAL MEDICINE

## 2022-05-12 PROCEDURE — 80202 ASSAY OF VANCOMYCIN: CPT

## 2022-05-12 PROCEDURE — 63710000001 INSULIN DETEMIR PER 5 UNITS: Performed by: INTERNAL MEDICINE

## 2022-05-12 PROCEDURE — 93306 TTE W/DOPPLER COMPLETE: CPT

## 2022-05-12 PROCEDURE — 87040 BLOOD CULTURE FOR BACTERIA: CPT | Performed by: INTERNAL MEDICINE

## 2022-05-12 PROCEDURE — 25010000002 VANCOMYCIN 5 G RECONSTITUTED SOLUTION: Performed by: HOSPITALIST

## 2022-05-12 PROCEDURE — 93306 TTE W/DOPPLER COMPLETE: CPT | Performed by: INTERNAL MEDICINE

## 2022-05-12 PROCEDURE — 25010000002 CEFEPIME PER 500 MG: Performed by: INTERNAL MEDICINE

## 2022-05-12 PROCEDURE — 99232 SBSQ HOSP IP/OBS MODERATE 35: CPT | Performed by: INTERNAL MEDICINE

## 2022-05-12 PROCEDURE — 80048 BASIC METABOLIC PNL TOTAL CA: CPT | Performed by: INTERNAL MEDICINE

## 2022-05-12 PROCEDURE — 97166 OT EVAL MOD COMPLEX 45 MIN: CPT

## 2022-05-12 RX ORDER — MAGNESIUM SULFATE HEPTAHYDRATE 40 MG/ML
2 INJECTION, SOLUTION INTRAVENOUS ONCE
Status: COMPLETED | OUTPATIENT
Start: 2022-05-12 | End: 2022-05-12

## 2022-05-12 RX ADMIN — VANCOMYCIN HYDROCHLORIDE 1750 MG: 5 INJECTION, POWDER, LYOPHILIZED, FOR SOLUTION INTRAVENOUS at 13:02

## 2022-05-12 RX ADMIN — INSULIN ASPART 5 UNITS: 100 INJECTION, SOLUTION INTRAVENOUS; SUBCUTANEOUS at 17:38

## 2022-05-12 RX ADMIN — PANTOPRAZOLE SODIUM 40 MG: 40 TABLET, DELAYED RELEASE ORAL at 05:42

## 2022-05-12 RX ADMIN — CLINDAMYCIN IN 5 PERCENT DEXTROSE 900 MG: 18 INJECTION, SOLUTION INTRAVENOUS at 21:12

## 2022-05-12 RX ADMIN — INSULIN DETEMIR 10 UNITS: 100 INJECTION, SOLUTION SUBCUTANEOUS at 08:13

## 2022-05-12 RX ADMIN — MAGNESIUM SULFATE HEPTAHYDRATE 2 G: 40 INJECTION, SOLUTION INTRAVENOUS at 08:13

## 2022-05-12 RX ADMIN — CLINDAMYCIN IN 5 PERCENT DEXTROSE 900 MG: 18 INJECTION, SOLUTION INTRAVENOUS at 15:01

## 2022-05-12 RX ADMIN — Medication 1 TABLET: at 08:13

## 2022-05-12 RX ADMIN — INSULIN ASPART 8 UNITS: 100 INJECTION, SOLUTION INTRAVENOUS; SUBCUTANEOUS at 10:58

## 2022-05-12 RX ADMIN — INSULIN ASPART 8 UNITS: 100 INJECTION, SOLUTION INTRAVENOUS; SUBCUTANEOUS at 08:12

## 2022-05-12 RX ADMIN — HEPARIN SODIUM 5000 UNITS: 5000 INJECTION INTRAVENOUS; SUBCUTANEOUS at 08:13

## 2022-05-12 RX ADMIN — DAKIN'S SOLUTION 0.125% (QUARTER STRENGTH): 0.12 SOLUTION at 21:00

## 2022-05-12 RX ADMIN — CEFEPIME HYDROCHLORIDE 2 G: 2 INJECTION, POWDER, FOR SOLUTION INTRAVENOUS at 05:41

## 2022-05-12 RX ADMIN — VANCOMYCIN HYDROCHLORIDE 1500 MG: 5 INJECTION, POWDER, LYOPHILIZED, FOR SOLUTION INTRAVENOUS at 10:58

## 2022-05-12 RX ADMIN — CLINDAMYCIN IN 5 PERCENT DEXTROSE 900 MG: 18 INJECTION, SOLUTION INTRAVENOUS at 05:42

## 2022-05-12 RX ADMIN — DAKIN'S SOLUTION 0.125% (QUARTER STRENGTH): 0.12 SOLUTION at 11:03

## 2022-05-12 RX ADMIN — HYDROCODONE BITARTRATE AND ACETAMINOPHEN 1 TABLET: 7.5; 325 TABLET ORAL at 20:59

## 2022-05-12 RX ADMIN — ATORVASTATIN CALCIUM 10 MG: 10 TABLET, FILM COATED ORAL at 08:13

## 2022-05-13 ENCOUNTER — ANESTHESIA EVENT (OUTPATIENT)
Dept: PERIOP | Facility: HOSPITAL | Age: 57
End: 2022-05-13

## 2022-05-13 ENCOUNTER — ANESTHESIA (OUTPATIENT)
Dept: PERIOP | Facility: HOSPITAL | Age: 57
End: 2022-05-13

## 2022-05-13 LAB
ANION GAP SERPL CALCULATED.3IONS-SCNC: 9.4 MMOL/L (ref 5–15)
BACTERIA SPEC AEROBE CULT: ABNORMAL
BUN SERPL-MCNC: 13 MG/DL (ref 6–20)
BUN/CREAT SERPL: 20.6 (ref 7–25)
CALCIUM SPEC-SCNC: 7.6 MG/DL (ref 8.6–10.5)
CHLORIDE SERPL-SCNC: 95 MMOL/L (ref 98–107)
CO2 SERPL-SCNC: 21.6 MMOL/L (ref 22–29)
CREAT SERPL-MCNC: 0.63 MG/DL (ref 0.76–1.27)
DEPRECATED RDW RBC AUTO: 41.6 FL (ref 37–54)
EGFRCR SERPLBLD CKD-EPI 2021: 110.9 ML/MIN/1.73
ERYTHROCYTE [DISTWIDTH] IN BLOOD BY AUTOMATED COUNT: 14.7 % (ref 12.3–15.4)
GLUCOSE BLDC GLUCOMTR-MCNC: 157 MG/DL (ref 70–130)
GLUCOSE BLDC GLUCOMTR-MCNC: 190 MG/DL (ref 70–130)
GLUCOSE BLDC GLUCOMTR-MCNC: 202 MG/DL (ref 70–130)
GLUCOSE BLDC GLUCOMTR-MCNC: 232 MG/DL (ref 70–130)
GLUCOSE BLDC GLUCOMTR-MCNC: 254 MG/DL (ref 70–130)
GLUCOSE SERPL-MCNC: 257 MG/DL (ref 65–99)
GRAM STN SPEC: ABNORMAL
HCT VFR BLD AUTO: 27.8 % (ref 37.5–51)
HGB BLD-MCNC: 9 G/DL (ref 13–17.7)
ISOLATED FROM: ABNORMAL
ISOLATED FROM: ABNORMAL
MAGNESIUM SERPL-MCNC: 1.8 MG/DL (ref 1.6–2.6)
MCH RBC QN AUTO: 25.1 PG (ref 26.6–33)
MCHC RBC AUTO-ENTMCNC: 32.4 G/DL (ref 31.5–35.7)
MCV RBC AUTO: 77.4 FL (ref 79–97)
OSMOLALITY SERPL: 284 MOSM/KG (ref 275–300)
OSMOLALITY UR: 342 MOSM/KG
PLATELET # BLD AUTO: 94 10*3/MM3 (ref 140–450)
PMV BLD AUTO: 10.7 FL (ref 6–12)
POTASSIUM SERPL-SCNC: 3.9 MMOL/L (ref 3.5–5.2)
RBC # BLD AUTO: 3.59 10*6/MM3 (ref 4.14–5.8)
SODIUM SERPL-SCNC: 126 MMOL/L (ref 136–145)
SODIUM UR-SCNC: 27 MMOL/L
WBC NRBC COR # BLD: 5.83 10*3/MM3 (ref 3.4–10.8)

## 2022-05-13 PROCEDURE — 25010000002 HEPARIN (PORCINE) PER 1000 UNITS: Performed by: PODIATRIST

## 2022-05-13 PROCEDURE — 88304 TISSUE EXAM BY PATHOLOGIST: CPT

## 2022-05-13 PROCEDURE — 63710000001 DIPHENHYDRAMINE PER 50 MG: Performed by: INTERNAL MEDICINE

## 2022-05-13 PROCEDURE — 63710000001 INSULIN DETEMIR PER 5 UNITS: Performed by: INTERNAL MEDICINE

## 2022-05-13 PROCEDURE — 25010000002 FENTANYL CITRATE (PF) 50 MCG/ML SOLUTION: Performed by: NURSE ANESTHETIST, CERTIFIED REGISTERED

## 2022-05-13 PROCEDURE — 25010000002 VANCOMYCIN 5 G RECONSTITUTED SOLUTION: Performed by: HOSPITALIST

## 2022-05-13 PROCEDURE — 83735 ASSAY OF MAGNESIUM: CPT | Performed by: INTERNAL MEDICINE

## 2022-05-13 PROCEDURE — 87186 SC STD MICRODIL/AGAR DIL: CPT | Performed by: PODIATRIST

## 2022-05-13 PROCEDURE — 83935 ASSAY OF URINE OSMOLALITY: CPT | Performed by: INTERNAL MEDICINE

## 2022-05-13 PROCEDURE — 87070 CULTURE OTHR SPECIMN AEROBIC: CPT | Performed by: PODIATRIST

## 2022-05-13 PROCEDURE — 82962 GLUCOSE BLOOD TEST: CPT

## 2022-05-13 PROCEDURE — 85027 COMPLETE CBC AUTOMATED: CPT | Performed by: INTERNAL MEDICINE

## 2022-05-13 PROCEDURE — 87205 SMEAR GRAM STAIN: CPT | Performed by: PODIATRIST

## 2022-05-13 PROCEDURE — 63710000001 INSULIN ASPART PER 5 UNITS: Performed by: PODIATRIST

## 2022-05-13 PROCEDURE — 87147 CULTURE TYPE IMMUNOLOGIC: CPT | Performed by: PODIATRIST

## 2022-05-13 PROCEDURE — 25010000002 MIDAZOLAM PER 1 MG: Performed by: NURSE ANESTHETIST, CERTIFIED REGISTERED

## 2022-05-13 PROCEDURE — 83930 ASSAY OF BLOOD OSMOLALITY: CPT | Performed by: INTERNAL MEDICINE

## 2022-05-13 PROCEDURE — 25010000002 VANCOMYCIN 5 G RECONSTITUTED SOLUTION: Performed by: PODIATRIST

## 2022-05-13 PROCEDURE — 99231 SBSQ HOSP IP/OBS SF/LOW 25: CPT | Performed by: INTERNAL MEDICINE

## 2022-05-13 PROCEDURE — 25010000002 HYDROMORPHONE 1 MG/ML SOLUTION: Performed by: NURSE ANESTHETIST, CERTIFIED REGISTERED

## 2022-05-13 PROCEDURE — 84300 ASSAY OF URINE SODIUM: CPT | Performed by: INTERNAL MEDICINE

## 2022-05-13 PROCEDURE — 63710000001 INSULIN ASPART PER 5 UNITS: Performed by: HOSPITALIST

## 2022-05-13 PROCEDURE — 80048 BASIC METABOLIC PNL TOTAL CA: CPT | Performed by: INTERNAL MEDICINE

## 2022-05-13 PROCEDURE — 25010000002 PROPOFOL 10 MG/ML EMULSION: Performed by: NURSE ANESTHETIST, CERTIFIED REGISTERED

## 2022-05-13 PROCEDURE — 0LBW0ZZ EXCISION OF LEFT FOOT TENDON, OPEN APPROACH: ICD-10-PCS | Performed by: PODIATRIST

## 2022-05-13 PROCEDURE — 25010000002 ONDANSETRON PER 1 MG: Performed by: NURSE ANESTHETIST, CERTIFIED REGISTERED

## 2022-05-13 PROCEDURE — 87176 TISSUE HOMOGENIZATION CULTR: CPT | Performed by: PODIATRIST

## 2022-05-13 RX ORDER — DIPHENHYDRAMINE HCL 25 MG
25 CAPSULE ORAL ONCE
Status: COMPLETED | OUTPATIENT
Start: 2022-05-13 | End: 2022-05-13

## 2022-05-13 RX ORDER — SODIUM CHLORIDE 0.9 % (FLUSH) 0.9 %
10 SYRINGE (ML) INJECTION EVERY 12 HOURS SCHEDULED
Status: DISCONTINUED | OUTPATIENT
Start: 2022-05-13 | End: 2022-05-13 | Stop reason: HOSPADM

## 2022-05-13 RX ORDER — SODIUM CHLORIDE 0.9 % (FLUSH) 0.9 %
10 SYRINGE (ML) INJECTION AS NEEDED
Status: DISCONTINUED | OUTPATIENT
Start: 2022-05-13 | End: 2022-05-13 | Stop reason: HOSPADM

## 2022-05-13 RX ORDER — MAGNESIUM HYDROXIDE 1200 MG/15ML
LIQUID ORAL AS NEEDED
Status: DISCONTINUED | OUTPATIENT
Start: 2022-05-13 | End: 2022-05-13 | Stop reason: HOSPADM

## 2022-05-13 RX ORDER — FENTANYL CITRATE 50 UG/ML
INJECTION, SOLUTION INTRAMUSCULAR; INTRAVENOUS AS NEEDED
Status: DISCONTINUED | OUTPATIENT
Start: 2022-05-13 | End: 2022-05-13 | Stop reason: SURG

## 2022-05-13 RX ORDER — MIDAZOLAM HYDROCHLORIDE 1 MG/ML
INJECTION INTRAMUSCULAR; INTRAVENOUS AS NEEDED
Status: DISCONTINUED | OUTPATIENT
Start: 2022-05-13 | End: 2022-05-13 | Stop reason: SURG

## 2022-05-13 RX ORDER — SODIUM CHLORIDE, SODIUM LACTATE, POTASSIUM CHLORIDE, CALCIUM CHLORIDE 600; 310; 30; 20 MG/100ML; MG/100ML; MG/100ML; MG/100ML
125 INJECTION, SOLUTION INTRAVENOUS ONCE
Status: COMPLETED | OUTPATIENT
Start: 2022-05-13 | End: 2022-05-13

## 2022-05-13 RX ORDER — FAMOTIDINE 10 MG/ML
INJECTION, SOLUTION INTRAVENOUS AS NEEDED
Status: DISCONTINUED | OUTPATIENT
Start: 2022-05-13 | End: 2022-05-13 | Stop reason: SURG

## 2022-05-13 RX ORDER — IPRATROPIUM BROMIDE AND ALBUTEROL SULFATE 2.5; .5 MG/3ML; MG/3ML
3 SOLUTION RESPIRATORY (INHALATION) ONCE AS NEEDED
Status: DISCONTINUED | OUTPATIENT
Start: 2022-05-13 | End: 2022-05-13 | Stop reason: HOSPADM

## 2022-05-13 RX ORDER — SODIUM HYPOCHLORITE 1.25 MG/ML
SOLUTION TOPICAL AS NEEDED
Status: DISCONTINUED | OUTPATIENT
Start: 2022-05-13 | End: 2022-05-13 | Stop reason: HOSPADM

## 2022-05-13 RX ORDER — OXYCODONE HYDROCHLORIDE AND ACETAMINOPHEN 5; 325 MG/1; MG/1
1 TABLET ORAL ONCE AS NEEDED
Status: DISCONTINUED | OUTPATIENT
Start: 2022-05-13 | End: 2022-05-13 | Stop reason: HOSPADM

## 2022-05-13 RX ORDER — EPHEDRINE SULFATE 5 MG/ML
INJECTION INTRAVENOUS AS NEEDED
Status: DISCONTINUED | OUTPATIENT
Start: 2022-05-13 | End: 2022-05-13 | Stop reason: SURG

## 2022-05-13 RX ORDER — MIDAZOLAM HYDROCHLORIDE 1 MG/ML
1 INJECTION INTRAMUSCULAR; INTRAVENOUS
Status: DISCONTINUED | OUTPATIENT
Start: 2022-05-13 | End: 2022-05-13 | Stop reason: HOSPADM

## 2022-05-13 RX ORDER — MEPERIDINE HYDROCHLORIDE 25 MG/ML
12.5 INJECTION INTRAMUSCULAR; INTRAVENOUS; SUBCUTANEOUS
Status: DISCONTINUED | OUTPATIENT
Start: 2022-05-13 | End: 2022-05-13 | Stop reason: HOSPADM

## 2022-05-13 RX ORDER — SODIUM CHLORIDE 9 MG/ML
100 INJECTION, SOLUTION INTRAVENOUS CONTINUOUS
Status: DISCONTINUED | OUTPATIENT
Start: 2022-05-13 | End: 2022-05-14

## 2022-05-13 RX ORDER — LIDOCAINE HYDROCHLORIDE 20 MG/ML
INJECTION, SOLUTION INFILTRATION; PERINEURAL AS NEEDED
Status: DISCONTINUED | OUTPATIENT
Start: 2022-05-13 | End: 2022-05-13 | Stop reason: SURG

## 2022-05-13 RX ORDER — ONDANSETRON 2 MG/ML
4 INJECTION INTRAMUSCULAR; INTRAVENOUS AS NEEDED
Status: DISCONTINUED | OUTPATIENT
Start: 2022-05-13 | End: 2022-05-13 | Stop reason: HOSPADM

## 2022-05-13 RX ORDER — SODIUM CHLORIDE, SODIUM LACTATE, POTASSIUM CHLORIDE, CALCIUM CHLORIDE 600; 310; 30; 20 MG/100ML; MG/100ML; MG/100ML; MG/100ML
100 INJECTION, SOLUTION INTRAVENOUS ONCE AS NEEDED
Status: DISCONTINUED | OUTPATIENT
Start: 2022-05-13 | End: 2022-05-13 | Stop reason: HOSPADM

## 2022-05-13 RX ORDER — SODIUM CHLORIDE 9 MG/ML
INJECTION, SOLUTION INTRAVENOUS AS NEEDED
Status: DISCONTINUED | OUTPATIENT
Start: 2022-05-13 | End: 2022-05-13 | Stop reason: HOSPADM

## 2022-05-13 RX ORDER — PROPOFOL 10 MG/ML
VIAL (ML) INTRAVENOUS AS NEEDED
Status: DISCONTINUED | OUTPATIENT
Start: 2022-05-13 | End: 2022-05-13 | Stop reason: SURG

## 2022-05-13 RX ORDER — FENTANYL CITRATE 50 UG/ML
50 INJECTION, SOLUTION INTRAMUSCULAR; INTRAVENOUS
Status: DISCONTINUED | OUTPATIENT
Start: 2022-05-13 | End: 2022-05-13 | Stop reason: HOSPADM

## 2022-05-13 RX ORDER — ONDANSETRON 2 MG/ML
INJECTION INTRAMUSCULAR; INTRAVENOUS AS NEEDED
Status: DISCONTINUED | OUTPATIENT
Start: 2022-05-13 | End: 2022-05-13 | Stop reason: SURG

## 2022-05-13 RX ADMIN — ONDANSETRON 4 MG: 2 INJECTION INTRAMUSCULAR; INTRAVENOUS at 13:31

## 2022-05-13 RX ADMIN — HYDROCODONE BITARTRATE AND ACETAMINOPHEN 1 TABLET: 7.5; 325 TABLET ORAL at 17:39

## 2022-05-13 RX ADMIN — VANCOMYCIN HYDROCHLORIDE 1750 MG: 5 INJECTION, POWDER, LYOPHILIZED, FOR SOLUTION INTRAVENOUS at 00:21

## 2022-05-13 RX ADMIN — VANCOMYCIN HYDROCHLORIDE 1750 MG: 5 INJECTION, POWDER, LYOPHILIZED, FOR SOLUTION INTRAVENOUS at 13:31

## 2022-05-13 RX ADMIN — HEPARIN SODIUM 5000 UNITS: 5000 INJECTION INTRAVENOUS; SUBCUTANEOUS at 20:13

## 2022-05-13 RX ADMIN — FENTANYL CITRATE 50 MCG: 50 INJECTION INTRAMUSCULAR; INTRAVENOUS at 14:49

## 2022-05-13 RX ADMIN — CLINDAMYCIN IN 5 PERCENT DEXTROSE 900 MG: 18 INJECTION, SOLUTION INTRAVENOUS at 05:16

## 2022-05-13 RX ADMIN — SODIUM CHLORIDE 100 ML/HR: 9 INJECTION, SOLUTION INTRAVENOUS at 08:56

## 2022-05-13 RX ADMIN — INSULIN ASPART 3 UNITS: 100 INJECTION, SOLUTION INTRAVENOUS; SUBCUTANEOUS at 17:30

## 2022-05-13 RX ADMIN — Medication 1 TABLET: at 08:47

## 2022-05-13 RX ADMIN — HYDROMORPHONE HYDROCHLORIDE 1 MG: 1 INJECTION, SOLUTION INTRAMUSCULAR; INTRAVENOUS; SUBCUTANEOUS at 14:45

## 2022-05-13 RX ADMIN — SODIUM CHLORIDE, POTASSIUM CHLORIDE, SODIUM LACTATE AND CALCIUM CHLORIDE 125 ML/HR: 600; 310; 30; 20 INJECTION, SOLUTION INTRAVENOUS at 13:21

## 2022-05-13 RX ADMIN — EPHEDRINE SULFATE 10 MG: 5 INJECTION INTRAVENOUS at 13:47

## 2022-05-13 RX ADMIN — FENTANYL CITRATE 50 MCG: 50 INJECTION INTRAMUSCULAR; INTRAVENOUS at 13:31

## 2022-05-13 RX ADMIN — ATORVASTATIN CALCIUM 10 MG: 10 TABLET, FILM COATED ORAL at 08:48

## 2022-05-13 RX ADMIN — DIPHENHYDRAMINE HYDROCHLORIDE 25 MG: 25 CAPSULE ORAL at 17:30

## 2022-05-13 RX ADMIN — LIDOCAINE HYDROCHLORIDE 20 MG: 20 INJECTION, SOLUTION INFILTRATION; PERINEURAL at 13:36

## 2022-05-13 RX ADMIN — INSULIN ASPART 8 UNITS: 100 INJECTION, SOLUTION INTRAVENOUS; SUBCUTANEOUS at 08:46

## 2022-05-13 RX ADMIN — INSULIN DETEMIR 20 UNITS: 100 INJECTION, SOLUTION SUBCUTANEOUS at 08:46

## 2022-05-13 RX ADMIN — PROPOFOL 100 MG: 10 INJECTION, EMULSION INTRAVENOUS at 13:36

## 2022-05-13 RX ADMIN — SODIUM CHLORIDE, POTASSIUM CHLORIDE, SODIUM LACTATE AND CALCIUM CHLORIDE: 600; 310; 30; 20 INJECTION, SOLUTION INTRAVENOUS at 13:31

## 2022-05-13 RX ADMIN — VANCOMYCIN HYDROCHLORIDE 1750 MG: 5 INJECTION, POWDER, LYOPHILIZED, FOR SOLUTION INTRAVENOUS at 11:06

## 2022-05-13 RX ADMIN — FAMOTIDINE 20 MG: 10 INJECTION INTRAVENOUS at 13:31

## 2022-05-13 RX ADMIN — MIDAZOLAM 2 MG: 1 INJECTION INTRAMUSCULAR; INTRAVENOUS at 13:31

## 2022-05-13 RX ADMIN — FENTANYL CITRATE 50 MCG: 50 INJECTION INTRAMUSCULAR; INTRAVENOUS at 13:56

## 2022-05-13 NOTE — ANESTHESIA PREPROCEDURE EVALUATION
Anesthesia Evaluation     Patient summary reviewed and Nursing notes reviewed   no history of anesthetic complications:  NPO Solid Status: > 8 hours  NPO Liquid Status: > 8 hours           Airway   Mallampati: III  TM distance: >3 FB  Neck ROM: full  Dental    (+) poor dentition    Pulmonary - negative pulmonary ROS and normal exam   Cardiovascular - normal exam    (+) hypertension, hyperlipidemia,       Neuro/Psych- negative ROS  GI/Hepatic/Renal/Endo    (+) obesity, morbid obesity,  renal disease CRI, diabetes mellitus type 2 poorly controlled using insulin,     Musculoskeletal (-) negative ROS    Abdominal   (+) obese,    Substance History - negative use     OB/GYN negative ob/gyn ROS         Other - negative ROS                         Anesthesia Plan    ASA 3     general   (Sedation with block)  intravenous induction     Anesthetic plan, all risks, benefits, and alternatives have been provided, discussed and informed consent has been obtained with: patient.  Use of blood products discussed with patient  Consented to blood products.       CODE STATUS:    Level Of Support Discussed With: Patient  Code Status (Patient has no pulse and is not breathing): CPR (Attempt to Resuscitate)  Medical Interventions (Patient has pulse or is breathing): Full Support     FULL      Lab Results   Component Value Date    WBC 5.83 05/13/2022    HGB 9.0 (L) 05/13/2022    HCT 27.8 (L) 05/13/2022    MCV 77.4 (L) 05/13/2022    PLT 94 (L) 05/13/2022       Lab Results   Component Value Date    GLUCOSE 257 (H) 05/13/2022    BUN 13 05/13/2022    CREATININE 0.63 (L) 05/13/2022    BCR 20.6 05/13/2022    K 3.9 05/13/2022    CO2 21.6 (L) 05/13/2022    CALCIUM 7.6 (L) 05/13/2022    ALBUMIN 1.87 (L) 05/12/2022    AST 32 05/12/2022    ALT 24 05/12/2022

## 2022-05-13 NOTE — ANESTHESIA POSTPROCEDURE EVALUATION
Patient: Melchor Hardwick    Procedure Summary     Date: 05/13/22 Room [facility] Location[facility] /  [facility]    Anesthesia Start: 1331 Anesthesia Stop: 1424    Procedure: DEBRIDEMENT FOOT (Left ) Diagnosis:       Gas gangrene of foot (HCC)      (Gas gangrene of foot (HCC) [A48.0])    Surgeons: Addison Colby MD Provider: Tom Luong MD    Anesthesia Type: general ASA Status: 3          Anesthesia Type: general    Vitals  Vitals Value Taken Time   /73 05/13/22 1455   Temp 99.3 °F (37.4 °C) 05/13/22 1455   Pulse 95 05/13/22 1455   Resp 16 05/13/22 1455   SpO2 95 % 05/13/22 1455           Post Anesthesia Care and Evaluation    Patient location during evaluation: PACU  Patient participation: complete - patient participated  Level of consciousness: awake  Pain score: 1  Pain management: adequate  Airway patency: patent  Anesthetic complications: No anesthetic complications  PONV Status: none  Cardiovascular status: acceptable  Respiratory status: acceptable  Hydration status: acceptable

## 2022-05-13 NOTE — ANESTHESIA PROCEDURE NOTES
Airway  Urgency: elective    Date/Time: 5/13/2022 1:37 PM  Airway not difficult    General Information and Staff    Patient location during procedure: OR  CRNA/CAA: Mony Tavarez CRNA    Indications and Patient Condition  Indications for airway management: airway protection    Preoxygenated: yes  MILS maintained throughout  Mask difficulty assessment: 0 - not attempted    Final Airway Details  Final airway type: supraglottic airway      Successful airway: unique  Size 4    Number of attempts at approach: 1  Assessment: lips, teeth, and gum same as pre-op

## 2022-05-14 LAB
ANION GAP SERPL CALCULATED.3IONS-SCNC: 8.2 MMOL/L (ref 5–15)
BUN SERPL-MCNC: 11 MG/DL (ref 6–20)
BUN/CREAT SERPL: 15.9 (ref 7–25)
CALCIUM SPEC-SCNC: 7.5 MG/DL (ref 8.6–10.5)
CHLORIDE SERPL-SCNC: 98 MMOL/L (ref 98–107)
CO2 SERPL-SCNC: 20.8 MMOL/L (ref 22–29)
CREAT SERPL-MCNC: 0.69 MG/DL (ref 0.76–1.27)
EGFRCR SERPLBLD CKD-EPI 2021: 107.9 ML/MIN/1.73
GLUCOSE BLDC GLUCOMTR-MCNC: 242 MG/DL (ref 70–130)
GLUCOSE BLDC GLUCOMTR-MCNC: 267 MG/DL (ref 70–130)
GLUCOSE BLDC GLUCOMTR-MCNC: 309 MG/DL (ref 70–130)
GLUCOSE BLDC GLUCOMTR-MCNC: 329 MG/DL (ref 70–130)
GLUCOSE SERPL-MCNC: 236 MG/DL (ref 65–99)
POTASSIUM SERPL-SCNC: 4.2 MMOL/L (ref 3.5–5.2)
SODIUM SERPL-SCNC: 127 MMOL/L (ref 136–145)
VANCOMYCIN TROUGH SERPL-MCNC: 12.7 MCG/ML (ref 5–20)

## 2022-05-14 PROCEDURE — 25010000002 VANCOMYCIN 5 G RECONSTITUTED SOLUTION: Performed by: PODIATRIST

## 2022-05-14 PROCEDURE — 86022 PLATELET ANTIBODIES: CPT | Performed by: INTERNAL MEDICINE

## 2022-05-14 PROCEDURE — 25010000002 HEPARIN (PORCINE) PER 1000 UNITS: Performed by: PODIATRIST

## 2022-05-14 PROCEDURE — 63710000001 INSULIN ASPART PER 5 UNITS: Performed by: PODIATRIST

## 2022-05-14 PROCEDURE — 63710000001 INSULIN DETEMIR PER 5 UNITS: Performed by: PODIATRIST

## 2022-05-14 PROCEDURE — 80048 BASIC METABOLIC PNL TOTAL CA: CPT | Performed by: PODIATRIST

## 2022-05-14 PROCEDURE — 82542 COL CHROMOTOGRAPHY QUAL/QUAN: CPT | Performed by: INTERNAL MEDICINE

## 2022-05-14 PROCEDURE — 80202 ASSAY OF VANCOMYCIN: CPT

## 2022-05-14 PROCEDURE — 82962 GLUCOSE BLOOD TEST: CPT

## 2022-05-14 PROCEDURE — 25010000002 MORPHINE PER 10 MG: Performed by: INTERNAL MEDICINE

## 2022-05-14 PROCEDURE — 99232 SBSQ HOSP IP/OBS MODERATE 35: CPT | Performed by: INTERNAL MEDICINE

## 2022-05-14 PROCEDURE — 63710000001 INSULIN DETEMIR PER 5 UNITS: Performed by: INTERNAL MEDICINE

## 2022-05-14 PROCEDURE — 63710000001 INSULIN ASPART PER 5 UNITS: Performed by: INTERNAL MEDICINE

## 2022-05-14 RX ORDER — INSULIN ASPART 100 [IU]/ML
8 INJECTION, SOLUTION INTRAVENOUS; SUBCUTANEOUS
Status: DISCONTINUED | OUTPATIENT
Start: 2022-05-14 | End: 2022-05-14

## 2022-05-14 RX ORDER — INSULIN ASPART 100 [IU]/ML
5 INJECTION, SOLUTION INTRAVENOUS; SUBCUTANEOUS
Status: DISCONTINUED | OUTPATIENT
Start: 2022-05-14 | End: 2022-05-23

## 2022-05-14 RX ORDER — MORPHINE SULFATE 2 MG/ML
2 INJECTION, SOLUTION INTRAMUSCULAR; INTRAVENOUS EVERY 4 HOURS PRN
Status: DISCONTINUED | OUTPATIENT
Start: 2022-05-14 | End: 2022-05-19

## 2022-05-14 RX ADMIN — INSULIN ASPART 5 UNITS: 100 INJECTION, SOLUTION INTRAVENOUS; SUBCUTANEOUS at 17:17

## 2022-05-14 RX ADMIN — HYDROCODONE BITARTRATE AND ACETAMINOPHEN 1 TABLET: 7.5; 325 TABLET ORAL at 14:16

## 2022-05-14 RX ADMIN — DAKIN'S SOLUTION 0.125% (QUARTER STRENGTH): 0.12 SOLUTION at 21:00

## 2022-05-14 RX ADMIN — INSULIN ASPART 10 UNITS: 100 INJECTION, SOLUTION INTRAVENOUS; SUBCUTANEOUS at 17:18

## 2022-05-14 RX ADMIN — ATORVASTATIN CALCIUM 10 MG: 10 TABLET, FILM COATED ORAL at 09:26

## 2022-05-14 RX ADMIN — HEPARIN SODIUM 5000 UNITS: 5000 INJECTION INTRAVENOUS; SUBCUTANEOUS at 09:26

## 2022-05-14 RX ADMIN — HYDROCODONE BITARTRATE AND ACETAMINOPHEN 1 TABLET: 7.5; 325 TABLET ORAL at 00:14

## 2022-05-14 RX ADMIN — INSULIN ASPART 10 UNITS: 100 INJECTION, SOLUTION INTRAVENOUS; SUBCUTANEOUS at 11:24

## 2022-05-14 RX ADMIN — PANTOPRAZOLE SODIUM 40 MG: 40 TABLET, DELAYED RELEASE ORAL at 05:21

## 2022-05-14 RX ADMIN — HYDROCODONE BITARTRATE AND ACETAMINOPHEN 1 TABLET: 7.5; 325 TABLET ORAL at 06:01

## 2022-05-14 RX ADMIN — INSULIN DETEMIR 20 UNITS: 100 INJECTION, SOLUTION SUBCUTANEOUS at 09:27

## 2022-05-14 RX ADMIN — INSULIN ASPART 5 UNITS: 100 INJECTION, SOLUTION INTRAVENOUS; SUBCUTANEOUS at 09:27

## 2022-05-14 RX ADMIN — Medication 1 TABLET: at 09:26

## 2022-05-14 RX ADMIN — INSULIN DETEMIR 10 UNITS: 100 INJECTION, SOLUTION SUBCUTANEOUS at 17:17

## 2022-05-14 RX ADMIN — VANCOMYCIN HYDROCHLORIDE 1750 MG: 5 INJECTION, POWDER, LYOPHILIZED, FOR SOLUTION INTRAVENOUS at 12:16

## 2022-05-14 RX ADMIN — SODIUM CHLORIDE 100 ML/HR: 9 INJECTION, SOLUTION INTRAVENOUS at 00:15

## 2022-05-14 RX ADMIN — MORPHINE SULFATE 2 MG: 2 INJECTION, SOLUTION INTRAMUSCULAR; INTRAVENOUS at 15:05

## 2022-05-14 RX ADMIN — VANCOMYCIN HYDROCHLORIDE 1750 MG: 5 INJECTION, POWDER, LYOPHILIZED, FOR SOLUTION INTRAVENOUS at 01:00

## 2022-05-15 LAB
ANION GAP SERPL CALCULATED.3IONS-SCNC: 8.9 MMOL/L (ref 5–15)
BASOPHILS # BLD AUTO: 0.02 10*3/MM3 (ref 0–0.2)
BASOPHILS NFR BLD AUTO: 0.3 % (ref 0–1.5)
BUN SERPL-MCNC: 13 MG/DL (ref 6–20)
BUN/CREAT SERPL: 18.1 (ref 7–25)
CALCIUM SPEC-SCNC: 7.7 MG/DL (ref 8.6–10.5)
CHLORIDE SERPL-SCNC: 98 MMOL/L (ref 98–107)
CO2 SERPL-SCNC: 21.1 MMOL/L (ref 22–29)
CREAT SERPL-MCNC: 0.72 MG/DL (ref 0.76–1.27)
CRP SERPL-MCNC: 12.64 MG/DL (ref 0–0.5)
DEPRECATED RDW RBC AUTO: 42.9 FL (ref 37–54)
EGFRCR SERPLBLD CKD-EPI 2021: 106.6 ML/MIN/1.73
EOSINOPHIL # BLD AUTO: 0.06 10*3/MM3 (ref 0–0.4)
EOSINOPHIL NFR BLD AUTO: 1 % (ref 0.3–6.2)
ERYTHROCYTE [DISTWIDTH] IN BLOOD BY AUTOMATED COUNT: 14.9 % (ref 12.3–15.4)
GLUCOSE BLDC GLUCOMTR-MCNC: 188 MG/DL (ref 70–130)
GLUCOSE BLDC GLUCOMTR-MCNC: 207 MG/DL (ref 70–130)
GLUCOSE BLDC GLUCOMTR-MCNC: 234 MG/DL (ref 70–130)
GLUCOSE SERPL-MCNC: 185 MG/DL (ref 65–99)
HCT VFR BLD AUTO: 32.1 % (ref 37.5–51)
HGB BLD-MCNC: 10.3 G/DL (ref 13–17.7)
IMM GRANULOCYTES # BLD AUTO: 0.02 10*3/MM3 (ref 0–0.05)
IMM GRANULOCYTES NFR BLD AUTO: 0.3 % (ref 0–0.5)
LYMPHOCYTES # BLD AUTO: 0.82 10*3/MM3 (ref 0.7–3.1)
LYMPHOCYTES NFR BLD AUTO: 14 % (ref 19.6–45.3)
MCH RBC QN AUTO: 25.4 PG (ref 26.6–33)
MCHC RBC AUTO-ENTMCNC: 32.1 G/DL (ref 31.5–35.7)
MCV RBC AUTO: 79.1 FL (ref 79–97)
MONOCYTES # BLD AUTO: 0.35 10*3/MM3 (ref 0.1–0.9)
MONOCYTES NFR BLD AUTO: 6 % (ref 5–12)
NEUTROPHILS NFR BLD AUTO: 4.57 10*3/MM3 (ref 1.7–7)
NEUTROPHILS NFR BLD AUTO: 78.4 % (ref 42.7–76)
NRBC BLD AUTO-RTO: 0 /100 WBC (ref 0–0.2)
PLATELET # BLD AUTO: 131 10*3/MM3 (ref 140–450)
PMV BLD AUTO: 10.7 FL (ref 6–12)
POTASSIUM SERPL-SCNC: 4.2 MMOL/L (ref 3.5–5.2)
RBC # BLD AUTO: 4.06 10*6/MM3 (ref 4.14–5.8)
SODIUM SERPL-SCNC: 128 MMOL/L (ref 136–145)
WBC NRBC COR # BLD: 5.84 10*3/MM3 (ref 3.4–10.8)

## 2022-05-15 PROCEDURE — 99232 SBSQ HOSP IP/OBS MODERATE 35: CPT | Performed by: INTERNAL MEDICINE

## 2022-05-15 PROCEDURE — 63710000001 INSULIN ASPART PER 5 UNITS: Performed by: PODIATRIST

## 2022-05-15 PROCEDURE — 82962 GLUCOSE BLOOD TEST: CPT

## 2022-05-15 PROCEDURE — 63710000001 INSULIN DETEMIR PER 5 UNITS: Performed by: INTERNAL MEDICINE

## 2022-05-15 PROCEDURE — 80048 BASIC METABOLIC PNL TOTAL CA: CPT | Performed by: INTERNAL MEDICINE

## 2022-05-15 PROCEDURE — 25010000002 VANCOMYCIN 5 G RECONSTITUTED SOLUTION: Performed by: INTERNAL MEDICINE

## 2022-05-15 PROCEDURE — 25010000002 MORPHINE PER 10 MG: Performed by: INTERNAL MEDICINE

## 2022-05-15 PROCEDURE — 86140 C-REACTIVE PROTEIN: CPT | Performed by: INTERNAL MEDICINE

## 2022-05-15 PROCEDURE — 63710000001 INSULIN ASPART PER 5 UNITS: Performed by: INTERNAL MEDICINE

## 2022-05-15 PROCEDURE — 85025 COMPLETE CBC W/AUTO DIFF WBC: CPT | Performed by: INTERNAL MEDICINE

## 2022-05-15 RX ADMIN — INSULIN ASPART 5 UNITS: 100 INJECTION, SOLUTION INTRAVENOUS; SUBCUTANEOUS at 08:04

## 2022-05-15 RX ADMIN — PANTOPRAZOLE SODIUM 40 MG: 40 TABLET, DELAYED RELEASE ORAL at 04:35

## 2022-05-15 RX ADMIN — MORPHINE SULFATE 2 MG: 2 INJECTION, SOLUTION INTRAMUSCULAR; INTRAVENOUS at 15:30

## 2022-05-15 RX ADMIN — Medication 10 ML: at 21:09

## 2022-05-15 RX ADMIN — VANCOMYCIN HYDROCHLORIDE 1750 MG: 5 INJECTION, POWDER, LYOPHILIZED, FOR SOLUTION INTRAVENOUS at 23:32

## 2022-05-15 RX ADMIN — MORPHINE SULFATE 2 MG: 2 INJECTION, SOLUTION INTRAMUSCULAR; INTRAVENOUS at 04:19

## 2022-05-15 RX ADMIN — INSULIN ASPART 3 UNITS: 100 INJECTION, SOLUTION INTRAVENOUS; SUBCUTANEOUS at 08:04

## 2022-05-15 RX ADMIN — VANCOMYCIN HYDROCHLORIDE 1750 MG: 5 INJECTION, POWDER, LYOPHILIZED, FOR SOLUTION INTRAVENOUS at 00:14

## 2022-05-15 RX ADMIN — INSULIN ASPART 5 UNITS: 100 INJECTION, SOLUTION INTRAVENOUS; SUBCUTANEOUS at 16:48

## 2022-05-15 RX ADMIN — VANCOMYCIN HYDROCHLORIDE 1750 MG: 5 INJECTION, POWDER, LYOPHILIZED, FOR SOLUTION INTRAVENOUS at 11:21

## 2022-05-15 RX ADMIN — INSULIN ASPART 5 UNITS: 100 INJECTION, SOLUTION INTRAVENOUS; SUBCUTANEOUS at 11:21

## 2022-05-15 RX ADMIN — DAKIN'S SOLUTION 0.125% (QUARTER STRENGTH): 0.12 SOLUTION at 08:05

## 2022-05-15 RX ADMIN — ATORVASTATIN CALCIUM 10 MG: 10 TABLET, FILM COATED ORAL at 08:03

## 2022-05-15 RX ADMIN — Medication 1 TABLET: at 08:03

## 2022-05-15 RX ADMIN — DAKIN'S SOLUTION 0.125% (QUARTER STRENGTH): 0.12 SOLUTION at 22:07

## 2022-05-15 RX ADMIN — HYDROCODONE BITARTRATE AND ACETAMINOPHEN 1 TABLET: 7.5; 325 TABLET ORAL at 21:09

## 2022-05-15 RX ADMIN — INSULIN DETEMIR 30 UNITS: 100 INJECTION, SOLUTION SUBCUTANEOUS at 08:03

## 2022-05-16 ENCOUNTER — APPOINTMENT (OUTPATIENT)
Dept: GENERAL RADIOLOGY | Facility: HOSPITAL | Age: 57
End: 2022-05-16

## 2022-05-16 ENCOUNTER — APPOINTMENT (OUTPATIENT)
Dept: ULTRASOUND IMAGING | Facility: HOSPITAL | Age: 57
End: 2022-05-16

## 2022-05-16 ENCOUNTER — APPOINTMENT (OUTPATIENT)
Dept: MRI IMAGING | Facility: HOSPITAL | Age: 57
End: 2022-05-16

## 2022-05-16 LAB
ALBUMIN SERPL-MCNC: 1.73 G/DL (ref 3.5–5.2)
ALBUMIN/GLOB SERPL: 0.5 G/DL
ALP SERPL-CCNC: 246 U/L (ref 39–117)
ALT SERPL W P-5'-P-CCNC: 27 U/L (ref 1–41)
ANION GAP SERPL CALCULATED.3IONS-SCNC: 7.2 MMOL/L (ref 5–15)
AST SERPL-CCNC: 40 U/L (ref 1–40)
BACTERIA SPEC AEROBE CULT: ABNORMAL
BASOPHILS # BLD AUTO: 0.02 10*3/MM3 (ref 0–0.2)
BASOPHILS NFR BLD AUTO: 0.4 % (ref 0–1.5)
BILIRUB SERPL-MCNC: 0.4 MG/DL (ref 0–1.2)
BUN SERPL-MCNC: 14 MG/DL (ref 6–20)
BUN/CREAT SERPL: 22.6 (ref 7–25)
CALCIUM SPEC-SCNC: 7.5 MG/DL (ref 8.6–10.5)
CHLORIDE SERPL-SCNC: 100 MMOL/L (ref 98–107)
CO2 SERPL-SCNC: 22.8 MMOL/L (ref 22–29)
CREAT SERPL-MCNC: 0.62 MG/DL (ref 0.76–1.27)
DEPRECATED RDW RBC AUTO: 41.5 FL (ref 37–54)
EGFRCR SERPLBLD CKD-EPI 2021: 111.5 ML/MIN/1.73
EOSINOPHIL # BLD AUTO: 0.08 10*3/MM3 (ref 0–0.4)
EOSINOPHIL NFR BLD AUTO: 1.5 % (ref 0.3–6.2)
ERYTHROCYTE [DISTWIDTH] IN BLOOD BY AUTOMATED COUNT: 14.8 % (ref 12.3–15.4)
GLOBULIN UR ELPH-MCNC: 3.8 GM/DL
GLUCOSE BLDC GLUCOMTR-MCNC: 268 MG/DL (ref 70–130)
GLUCOSE BLDC GLUCOMTR-MCNC: 269 MG/DL (ref 70–130)
GLUCOSE BLDC GLUCOMTR-MCNC: 283 MG/DL (ref 70–130)
GLUCOSE SERPL-MCNC: 259 MG/DL (ref 65–99)
GRAM STN SPEC: ABNORMAL
HCT VFR BLD AUTO: 28.3 % (ref 37.5–51)
HGB BLD-MCNC: 9.1 G/DL (ref 13–17.7)
IMM GRANULOCYTES # BLD AUTO: 0.02 10*3/MM3 (ref 0–0.05)
IMM GRANULOCYTES NFR BLD AUTO: 0.4 % (ref 0–0.5)
LYMPHOCYTES # BLD AUTO: 0.8 10*3/MM3 (ref 0.7–3.1)
LYMPHOCYTES NFR BLD AUTO: 15.1 % (ref 19.6–45.3)
MCH RBC QN AUTO: 25 PG (ref 26.6–33)
MCHC RBC AUTO-ENTMCNC: 32.2 G/DL (ref 31.5–35.7)
MCV RBC AUTO: 77.7 FL (ref 79–97)
MONOCYTES # BLD AUTO: 0.43 10*3/MM3 (ref 0.1–0.9)
MONOCYTES NFR BLD AUTO: 8.1 % (ref 5–12)
NEUTROPHILS NFR BLD AUTO: 3.94 10*3/MM3 (ref 1.7–7)
NEUTROPHILS NFR BLD AUTO: 74.5 % (ref 42.7–76)
NRBC BLD AUTO-RTO: 0 /100 WBC (ref 0–0.2)
PLATELET # BLD AUTO: 118 10*3/MM3 (ref 140–450)
PMV BLD AUTO: 10.1 FL (ref 6–12)
POTASSIUM SERPL-SCNC: 4.1 MMOL/L (ref 3.5–5.2)
PROT SERPL-MCNC: 5.5 G/DL (ref 6–8.5)
RBC # BLD AUTO: 3.64 10*6/MM3 (ref 4.14–5.8)
REF LAB TEST METHOD: NORMAL
SODIUM SERPL-SCNC: 130 MMOL/L (ref 136–145)
WBC NRBC COR # BLD: 5.29 10*3/MM3 (ref 3.4–10.8)

## 2022-05-16 PROCEDURE — 36410 VNPNXR 3YR/> PHY/QHP DX/THER: CPT

## 2022-05-16 PROCEDURE — 25010000002 MORPHINE PER 10 MG: Performed by: INTERNAL MEDICINE

## 2022-05-16 PROCEDURE — 82962 GLUCOSE BLOOD TEST: CPT

## 2022-05-16 PROCEDURE — 80053 COMPREHEN METABOLIC PANEL: CPT | Performed by: INTERNAL MEDICINE

## 2022-05-16 PROCEDURE — 70030 X-RAY EYE FOR FOREIGN BODY: CPT | Performed by: RADIOLOGY

## 2022-05-16 PROCEDURE — 63710000001 INSULIN ASPART PER 5 UNITS: Performed by: INTERNAL MEDICINE

## 2022-05-16 PROCEDURE — 76870 US EXAM SCROTUM: CPT | Performed by: RADIOLOGY

## 2022-05-16 PROCEDURE — 63710000001 INSULIN ASPART PER 5 UNITS: Performed by: PODIATRIST

## 2022-05-16 PROCEDURE — 25010000002 VANCOMYCIN 5 G RECONSTITUTED SOLUTION: Performed by: INTERNAL MEDICINE

## 2022-05-16 PROCEDURE — C1751 CATH, INF, PER/CENT/MIDLINE: HCPCS

## 2022-05-16 PROCEDURE — 73718 MRI LOWER EXTREMITY W/O DYE: CPT | Performed by: RADIOLOGY

## 2022-05-16 PROCEDURE — 99231 SBSQ HOSP IP/OBS SF/LOW 25: CPT | Performed by: INTERNAL MEDICINE

## 2022-05-16 PROCEDURE — 73718 MRI LOWER EXTREMITY W/O DYE: CPT

## 2022-05-16 PROCEDURE — 85025 COMPLETE CBC W/AUTO DIFF WBC: CPT | Performed by: INTERNAL MEDICINE

## 2022-05-16 PROCEDURE — 63710000001 INSULIN DETEMIR PER 5 UNITS: Performed by: INTERNAL MEDICINE

## 2022-05-16 PROCEDURE — 97161 PT EVAL LOW COMPLEX 20 MIN: CPT

## 2022-05-16 PROCEDURE — 76870 US EXAM SCROTUM: CPT

## 2022-05-16 RX ORDER — SODIUM CHLORIDE 0.9 % (FLUSH) 0.9 %
10 SYRINGE (ML) INJECTION EVERY 12 HOURS SCHEDULED
Status: DISCONTINUED | OUTPATIENT
Start: 2022-05-16 | End: 2022-06-02 | Stop reason: HOSPADM

## 2022-05-16 RX ORDER — SODIUM CHLORIDE 0.9 % (FLUSH) 0.9 %
10 SYRINGE (ML) INJECTION AS NEEDED
Status: DISCONTINUED | OUTPATIENT
Start: 2022-05-16 | End: 2022-06-02 | Stop reason: HOSPADM

## 2022-05-16 RX ADMIN — Medication 1 TABLET: at 08:04

## 2022-05-16 RX ADMIN — HYDROCODONE BITARTRATE AND ACETAMINOPHEN 1 TABLET: 7.5; 325 TABLET ORAL at 23:41

## 2022-05-16 RX ADMIN — INSULIN ASPART 5 UNITS: 100 INJECTION, SOLUTION INTRAVENOUS; SUBCUTANEOUS at 16:45

## 2022-05-16 RX ADMIN — PANTOPRAZOLE SODIUM 40 MG: 40 TABLET, DELAYED RELEASE ORAL at 05:04

## 2022-05-16 RX ADMIN — INSULIN ASPART 8 UNITS: 100 INJECTION, SOLUTION INTRAVENOUS; SUBCUTANEOUS at 08:04

## 2022-05-16 RX ADMIN — INSULIN ASPART 8 UNITS: 100 INJECTION, SOLUTION INTRAVENOUS; SUBCUTANEOUS at 16:45

## 2022-05-16 RX ADMIN — DAKIN'S SOLUTION 0.125% (QUARTER STRENGTH): 0.12 SOLUTION at 20:39

## 2022-05-16 RX ADMIN — INSULIN ASPART 5 UNITS: 100 INJECTION, SOLUTION INTRAVENOUS; SUBCUTANEOUS at 08:04

## 2022-05-16 RX ADMIN — Medication 10 ML: at 08:05

## 2022-05-16 RX ADMIN — INSULIN ASPART 5 UNITS: 100 INJECTION, SOLUTION INTRAVENOUS; SUBCUTANEOUS at 11:37

## 2022-05-16 RX ADMIN — ATORVASTATIN CALCIUM 10 MG: 10 TABLET, FILM COATED ORAL at 08:04

## 2022-05-16 RX ADMIN — VANCOMYCIN HYDROCHLORIDE 1750 MG: 5 INJECTION, POWDER, LYOPHILIZED, FOR SOLUTION INTRAVENOUS at 23:38

## 2022-05-16 RX ADMIN — VANCOMYCIN HYDROCHLORIDE 1750 MG: 5 INJECTION, POWDER, LYOPHILIZED, FOR SOLUTION INTRAVENOUS at 11:37

## 2022-05-16 RX ADMIN — INSULIN DETEMIR 35 UNITS: 100 INJECTION, SOLUTION SUBCUTANEOUS at 08:05

## 2022-05-16 RX ADMIN — MORPHINE SULFATE 2 MG: 2 INJECTION, SOLUTION INTRAMUSCULAR; INTRAVENOUS at 08:26

## 2022-05-16 RX ADMIN — INSULIN ASPART 8 UNITS: 100 INJECTION, SOLUTION INTRAVENOUS; SUBCUTANEOUS at 11:36

## 2022-05-16 RX ADMIN — MORPHINE SULFATE 2 MG: 2 INJECTION, SOLUTION INTRAMUSCULAR; INTRAVENOUS at 04:42

## 2022-05-16 RX ADMIN — Medication 10 ML: at 20:36

## 2022-05-16 RX ADMIN — DAKIN'S SOLUTION 0.125% (QUARTER STRENGTH): 0.12 SOLUTION at 08:05

## 2022-05-17 LAB
ANION GAP SERPL CALCULATED.3IONS-SCNC: 7.5 MMOL/L (ref 5–15)
BACTERIA SPEC AEROBE CULT: NORMAL
BACTERIA SPEC AEROBE CULT: NORMAL
BASOPHILS # BLD AUTO: 0.02 10*3/MM3 (ref 0–0.2)
BASOPHILS NFR BLD AUTO: 0.4 % (ref 0–1.5)
BUN SERPL-MCNC: 15 MG/DL (ref 6–20)
BUN/CREAT SERPL: 25.4 (ref 7–25)
CALCIUM SPEC-SCNC: 7.6 MG/DL (ref 8.6–10.5)
CHLORIDE SERPL-SCNC: 101 MMOL/L (ref 98–107)
CO2 SERPL-SCNC: 24.5 MMOL/L (ref 22–29)
CREAT SERPL-MCNC: 0.59 MG/DL (ref 0.76–1.27)
DEPRECATED RDW RBC AUTO: 42.3 FL (ref 37–54)
EGFRCR SERPLBLD CKD-EPI 2021: 113.2 ML/MIN/1.73
EOSINOPHIL # BLD AUTO: 0.11 10*3/MM3 (ref 0–0.4)
EOSINOPHIL NFR BLD AUTO: 2.4 % (ref 0.3–6.2)
ERYTHROCYTE [DISTWIDTH] IN BLOOD BY AUTOMATED COUNT: 15.1 % (ref 12.3–15.4)
GLUCOSE BLDC GLUCOMTR-MCNC: 144 MG/DL (ref 70–130)
GLUCOSE BLDC GLUCOMTR-MCNC: 146 MG/DL (ref 70–130)
GLUCOSE BLDC GLUCOMTR-MCNC: 151 MG/DL (ref 70–130)
GLUCOSE BLDC GLUCOMTR-MCNC: 270 MG/DL (ref 70–130)
GLUCOSE SERPL-MCNC: 160 MG/DL (ref 65–99)
HCT VFR BLD AUTO: 26.1 % (ref 37.5–51)
HGB BLD-MCNC: 8.4 G/DL (ref 13–17.7)
IMM GRANULOCYTES # BLD AUTO: 0.01 10*3/MM3 (ref 0–0.05)
IMM GRANULOCYTES NFR BLD AUTO: 0.2 % (ref 0–0.5)
LYMPHOCYTES # BLD AUTO: 0.87 10*3/MM3 (ref 0.7–3.1)
LYMPHOCYTES NFR BLD AUTO: 18.9 % (ref 19.6–45.3)
MCH RBC QN AUTO: 25 PG (ref 26.6–33)
MCHC RBC AUTO-ENTMCNC: 32.2 G/DL (ref 31.5–35.7)
MCV RBC AUTO: 77.7 FL (ref 79–97)
MONOCYTES # BLD AUTO: 0.46 10*3/MM3 (ref 0.1–0.9)
MONOCYTES NFR BLD AUTO: 10 % (ref 5–12)
NEUTROPHILS NFR BLD AUTO: 3.14 10*3/MM3 (ref 1.7–7)
NEUTROPHILS NFR BLD AUTO: 68.1 % (ref 42.7–76)
NRBC BLD AUTO-RTO: 0 /100 WBC (ref 0–0.2)
PF4 HEPARIN CMPLX IGG SERPL IA: 0.07 OD (ref 0–0.4)
PLATELET # BLD AUTO: 111 10*3/MM3 (ref 140–450)
PMV BLD AUTO: 10.2 FL (ref 6–12)
POTASSIUM SERPL-SCNC: 3.7 MMOL/L (ref 3.5–5.2)
RBC # BLD AUTO: 3.36 10*6/MM3 (ref 4.14–5.8)
SODIUM SERPL-SCNC: 133 MMOL/L (ref 136–145)
WBC NRBC COR # BLD: 4.61 10*3/MM3 (ref 3.4–10.8)

## 2022-05-17 PROCEDURE — 94799 UNLISTED PULMONARY SVC/PX: CPT

## 2022-05-17 PROCEDURE — 99231 SBSQ HOSP IP/OBS SF/LOW 25: CPT | Performed by: INTERNAL MEDICINE

## 2022-05-17 PROCEDURE — 63710000001 INSULIN ASPART PER 5 UNITS: Performed by: PODIATRIST

## 2022-05-17 PROCEDURE — 25010000002 VANCOMYCIN 5 G RECONSTITUTED SOLUTION: Performed by: INTERNAL MEDICINE

## 2022-05-17 PROCEDURE — 82962 GLUCOSE BLOOD TEST: CPT

## 2022-05-17 PROCEDURE — 94761 N-INVAS EAR/PLS OXIMETRY MLT: CPT

## 2022-05-17 PROCEDURE — 63710000001 INSULIN DETEMIR PER 5 UNITS: Performed by: INTERNAL MEDICINE

## 2022-05-17 PROCEDURE — 25010000002 MORPHINE PER 10 MG: Performed by: INTERNAL MEDICINE

## 2022-05-17 PROCEDURE — 63710000001 INSULIN ASPART PER 5 UNITS: Performed by: INTERNAL MEDICINE

## 2022-05-17 PROCEDURE — 80048 BASIC METABOLIC PNL TOTAL CA: CPT | Performed by: INTERNAL MEDICINE

## 2022-05-17 PROCEDURE — 85025 COMPLETE CBC W/AUTO DIFF WBC: CPT | Performed by: INTERNAL MEDICINE

## 2022-05-17 RX ADMIN — Medication 10 ML: at 20:41

## 2022-05-17 RX ADMIN — INSULIN DETEMIR 35 UNITS: 100 INJECTION, SOLUTION SUBCUTANEOUS at 08:48

## 2022-05-17 RX ADMIN — MORPHINE SULFATE 2 MG: 2 INJECTION, SOLUTION INTRAMUSCULAR; INTRAVENOUS at 15:49

## 2022-05-17 RX ADMIN — INSULIN ASPART 5 UNITS: 100 INJECTION, SOLUTION INTRAVENOUS; SUBCUTANEOUS at 08:47

## 2022-05-17 RX ADMIN — INSULIN ASPART 5 UNITS: 100 INJECTION, SOLUTION INTRAVENOUS; SUBCUTANEOUS at 16:55

## 2022-05-17 RX ADMIN — INSULIN ASPART 5 UNITS: 100 INJECTION, SOLUTION INTRAVENOUS; SUBCUTANEOUS at 11:01

## 2022-05-17 RX ADMIN — VANCOMYCIN HYDROCHLORIDE 1750 MG: 5 INJECTION, POWDER, LYOPHILIZED, FOR SOLUTION INTRAVENOUS at 23:20

## 2022-05-17 RX ADMIN — VANCOMYCIN HYDROCHLORIDE 1750 MG: 5 INJECTION, POWDER, LYOPHILIZED, FOR SOLUTION INTRAVENOUS at 11:02

## 2022-05-17 RX ADMIN — Medication 1 TABLET: at 08:47

## 2022-05-17 RX ADMIN — INSULIN ASPART 2 UNITS: 100 INJECTION, SOLUTION INTRAVENOUS; SUBCUTANEOUS at 16:55

## 2022-05-17 RX ADMIN — MORPHINE SULFATE 2 MG: 2 INJECTION, SOLUTION INTRAMUSCULAR; INTRAVENOUS at 23:26

## 2022-05-17 RX ADMIN — INSULIN ASPART 8 UNITS: 100 INJECTION, SOLUTION INTRAVENOUS; SUBCUTANEOUS at 11:01

## 2022-05-17 RX ADMIN — ATORVASTATIN CALCIUM 10 MG: 10 TABLET, FILM COATED ORAL at 08:47

## 2022-05-17 RX ADMIN — DAKIN'S SOLUTION 0.125% (QUARTER STRENGTH): 0.12 SOLUTION at 08:47

## 2022-05-17 RX ADMIN — MORPHINE SULFATE 2 MG: 2 INJECTION, SOLUTION INTRAMUSCULAR; INTRAVENOUS at 04:39

## 2022-05-17 RX ADMIN — PANTOPRAZOLE SODIUM 40 MG: 40 TABLET, DELAYED RELEASE ORAL at 04:39

## 2022-05-17 RX ADMIN — Medication 10 ML: at 08:47

## 2022-05-17 RX ADMIN — DAKIN'S SOLUTION 0.125% (QUARTER STRENGTH): 0.12 SOLUTION at 20:40

## 2022-05-18 ENCOUNTER — ANESTHESIA (OUTPATIENT)
Dept: PERIOP | Facility: HOSPITAL | Age: 57
End: 2022-05-18

## 2022-05-18 ENCOUNTER — ANESTHESIA EVENT (OUTPATIENT)
Dept: PERIOP | Facility: HOSPITAL | Age: 57
End: 2022-05-18

## 2022-05-18 LAB
ANION GAP SERPL CALCULATED.3IONS-SCNC: 8.4 MMOL/L (ref 5–15)
BASOPHILS # BLD AUTO: 0.02 10*3/MM3 (ref 0–0.2)
BASOPHILS NFR BLD AUTO: 0.4 % (ref 0–1.5)
BUN SERPL-MCNC: 13 MG/DL (ref 6–20)
BUN/CREAT SERPL: 20 (ref 7–25)
CALCIUM SPEC-SCNC: 7.7 MG/DL (ref 8.6–10.5)
CHLORIDE SERPL-SCNC: 101 MMOL/L (ref 98–107)
CO2 SERPL-SCNC: 23.6 MMOL/L (ref 22–29)
CREAT SERPL-MCNC: 0.65 MG/DL (ref 0.76–1.27)
DEPRECATED RDW RBC AUTO: 43.4 FL (ref 37–54)
EGFRCR SERPLBLD CKD-EPI 2021: 109.9 ML/MIN/1.73
EOSINOPHIL # BLD AUTO: 0.08 10*3/MM3 (ref 0–0.4)
EOSINOPHIL NFR BLD AUTO: 1.5 % (ref 0.3–6.2)
ERYTHROCYTE [DISTWIDTH] IN BLOOD BY AUTOMATED COUNT: 15.2 % (ref 12.3–15.4)
GLUCOSE BLDC GLUCOMTR-MCNC: 103 MG/DL (ref 70–130)
GLUCOSE BLDC GLUCOMTR-MCNC: 104 MG/DL (ref 70–130)
GLUCOSE BLDC GLUCOMTR-MCNC: 136 MG/DL (ref 70–130)
GLUCOSE BLDC GLUCOMTR-MCNC: 139 MG/DL (ref 70–130)
GLUCOSE SERPL-MCNC: 135 MG/DL (ref 65–99)
HCT VFR BLD AUTO: 27.6 % (ref 37.5–51)
HGB BLD-MCNC: 8.9 G/DL (ref 13–17.7)
IMM GRANULOCYTES # BLD AUTO: 0.02 10*3/MM3 (ref 0–0.05)
IMM GRANULOCYTES NFR BLD AUTO: 0.4 % (ref 0–0.5)
LYMPHOCYTES # BLD AUTO: 0.94 10*3/MM3 (ref 0.7–3.1)
LYMPHOCYTES NFR BLD AUTO: 17.3 % (ref 19.6–45.3)
MCH RBC QN AUTO: 25.4 PG (ref 26.6–33)
MCHC RBC AUTO-ENTMCNC: 32.2 G/DL (ref 31.5–35.7)
MCV RBC AUTO: 78.6 FL (ref 79–97)
MONOCYTES # BLD AUTO: 0.46 10*3/MM3 (ref 0.1–0.9)
MONOCYTES NFR BLD AUTO: 8.5 % (ref 5–12)
NEUTROPHILS NFR BLD AUTO: 3.91 10*3/MM3 (ref 1.7–7)
NEUTROPHILS NFR BLD AUTO: 71.9 % (ref 42.7–76)
NRBC BLD AUTO-RTO: 0 /100 WBC (ref 0–0.2)
PLATELET # BLD AUTO: 132 10*3/MM3 (ref 140–450)
PMV BLD AUTO: 10.8 FL (ref 6–12)
POTASSIUM SERPL-SCNC: 3.7 MMOL/L (ref 3.5–5.2)
RBC # BLD AUTO: 3.51 10*6/MM3 (ref 4.14–5.8)
SARS-COV-2 RNA RESP QL NAA+PROBE: NOT DETECTED
SODIUM SERPL-SCNC: 133 MMOL/L (ref 136–145)
SRA .2 IU/ML UFH SER-ACNC: <1 % (ref 0–20)
SRA 100IU/ML UFH SER-ACNC: <1 % (ref 0–20)
SRA UFH SER-IMP: NORMAL
WBC NRBC COR # BLD: 5.43 10*3/MM3 (ref 3.4–10.8)

## 2022-05-18 PROCEDURE — 80048 BASIC METABOLIC PNL TOTAL CA: CPT | Performed by: INTERNAL MEDICINE

## 2022-05-18 PROCEDURE — 87070 CULTURE OTHR SPECIMN AEROBIC: CPT | Performed by: PODIATRIST

## 2022-05-18 PROCEDURE — 99231 SBSQ HOSP IP/OBS SF/LOW 25: CPT | Performed by: INTERNAL MEDICINE

## 2022-05-18 PROCEDURE — 25010000002 ONDANSETRON PER 1 MG: Performed by: NURSE ANESTHETIST, CERTIFIED REGISTERED

## 2022-05-18 PROCEDURE — 87176 TISSUE HOMOGENIZATION CULTR: CPT | Performed by: PODIATRIST

## 2022-05-18 PROCEDURE — 0Y6N0Z0 DETACHMENT AT LEFT FOOT, COMPLETE, OPEN APPROACH: ICD-10-PCS | Performed by: PODIATRIST

## 2022-05-18 PROCEDURE — 87186 SC STD MICRODIL/AGAR DIL: CPT | Performed by: PODIATRIST

## 2022-05-18 PROCEDURE — 88307 TISSUE EXAM BY PATHOLOGIST: CPT

## 2022-05-18 PROCEDURE — 88311 DECALCIFY TISSUE: CPT

## 2022-05-18 PROCEDURE — 25010000002 HYDROMORPHONE 1 MG/ML SOLUTION: Performed by: NURSE ANESTHETIST, CERTIFIED REGISTERED

## 2022-05-18 PROCEDURE — 25010000002 MIDAZOLAM PER 1 MG: Performed by: NURSE ANESTHETIST, CERTIFIED REGISTERED

## 2022-05-18 PROCEDURE — 82962 GLUCOSE BLOOD TEST: CPT

## 2022-05-18 PROCEDURE — 25010000002 FENTANYL CITRATE (PF) 50 MCG/ML SOLUTION: Performed by: NURSE ANESTHETIST, CERTIFIED REGISTERED

## 2022-05-18 PROCEDURE — 87147 CULTURE TYPE IMMUNOLOGIC: CPT | Performed by: PODIATRIST

## 2022-05-18 PROCEDURE — 25010000002 VANCOMYCIN 1 G RECONSTITUTED SOLUTION: Performed by: PODIATRIST

## 2022-05-18 PROCEDURE — 25010000002 VANCOMYCIN 5 G RECONSTITUTED SOLUTION: Performed by: INTERNAL MEDICINE

## 2022-05-18 PROCEDURE — 85025 COMPLETE CBC W/AUTO DIFF WBC: CPT | Performed by: INTERNAL MEDICINE

## 2022-05-18 PROCEDURE — 87205 SMEAR GRAM STAIN: CPT | Performed by: PODIATRIST

## 2022-05-18 PROCEDURE — 25010000002 PROPOFOL 10 MG/ML EMULSION: Performed by: NURSE ANESTHETIST, CERTIFIED REGISTERED

## 2022-05-18 PROCEDURE — 63710000001 INSULIN DETEMIR PER 5 UNITS: Performed by: INTERNAL MEDICINE

## 2022-05-18 PROCEDURE — 25010000002 DIPHENHYDRAMINE PER 50 MG: Performed by: INTERNAL MEDICINE

## 2022-05-18 PROCEDURE — 25010000002 MORPHINE PER 10 MG: Performed by: PODIATRIST

## 2022-05-18 PROCEDURE — 87635 SARS-COV-2 COVID-19 AMP PRB: CPT | Performed by: PODIATRIST

## 2022-05-18 DEVICE — GRFT ECM STRAVIX PL LYOPRESERVED 3X6CM 18SQCM BX/1: Type: IMPLANTABLE DEVICE | Site: FOOT | Status: FUNCTIONAL

## 2022-05-18 RX ORDER — DIPHENHYDRAMINE HYDROCHLORIDE 50 MG/ML
25 INJECTION INTRAMUSCULAR; INTRAVENOUS ONCE
Status: COMPLETED | OUTPATIENT
Start: 2022-05-18 | End: 2022-05-18

## 2022-05-18 RX ORDER — SODIUM CHLORIDE, SODIUM LACTATE, POTASSIUM CHLORIDE, CALCIUM CHLORIDE 600; 310; 30; 20 MG/100ML; MG/100ML; MG/100ML; MG/100ML
100 INJECTION, SOLUTION INTRAVENOUS ONCE AS NEEDED
Status: DISCONTINUED | OUTPATIENT
Start: 2022-05-18 | End: 2022-05-18 | Stop reason: HOSPADM

## 2022-05-18 RX ORDER — MIDAZOLAM HYDROCHLORIDE 1 MG/ML
INJECTION INTRAMUSCULAR; INTRAVENOUS AS NEEDED
Status: DISCONTINUED | OUTPATIENT
Start: 2022-05-18 | End: 2022-05-18 | Stop reason: SURG

## 2022-05-18 RX ORDER — SODIUM CHLORIDE, SODIUM LACTATE, POTASSIUM CHLORIDE, CALCIUM CHLORIDE 600; 310; 30; 20 MG/100ML; MG/100ML; MG/100ML; MG/100ML
125 INJECTION, SOLUTION INTRAVENOUS ONCE
Status: COMPLETED | OUTPATIENT
Start: 2022-05-18 | End: 2022-05-18

## 2022-05-18 RX ORDER — BUPIVACAINE HYDROCHLORIDE 5 MG/ML
INJECTION, SOLUTION PERINEURAL AS NEEDED
Status: DISCONTINUED | OUTPATIENT
Start: 2022-05-18 | End: 2022-05-18 | Stop reason: HOSPADM

## 2022-05-18 RX ORDER — FENTANYL CITRATE 50 UG/ML
50 INJECTION, SOLUTION INTRAMUSCULAR; INTRAVENOUS
Status: DISCONTINUED | OUTPATIENT
Start: 2022-05-18 | End: 2022-05-18 | Stop reason: HOSPADM

## 2022-05-18 RX ORDER — ONDANSETRON 2 MG/ML
4 INJECTION INTRAMUSCULAR; INTRAVENOUS AS NEEDED
Status: DISCONTINUED | OUTPATIENT
Start: 2022-05-18 | End: 2022-05-18 | Stop reason: HOSPADM

## 2022-05-18 RX ORDER — MAGNESIUM HYDROXIDE 1200 MG/15ML
LIQUID ORAL AS NEEDED
Status: DISCONTINUED | OUTPATIENT
Start: 2022-05-18 | End: 2022-05-18 | Stop reason: HOSPADM

## 2022-05-18 RX ORDER — HYDROCODONE BITARTRATE AND ACETAMINOPHEN 7.5; 325 MG/1; MG/1
1 TABLET ORAL EVERY 6 HOURS PRN
Status: DISCONTINUED | OUTPATIENT
Start: 2022-05-18 | End: 2022-05-30

## 2022-05-18 RX ORDER — OXYCODONE HYDROCHLORIDE AND ACETAMINOPHEN 5; 325 MG/1; MG/1
1 TABLET ORAL ONCE AS NEEDED
Status: COMPLETED | OUTPATIENT
Start: 2022-05-18 | End: 2022-05-18

## 2022-05-18 RX ORDER — IPRATROPIUM BROMIDE AND ALBUTEROL SULFATE 2.5; .5 MG/3ML; MG/3ML
3 SOLUTION RESPIRATORY (INHALATION) ONCE AS NEEDED
Status: DISCONTINUED | OUTPATIENT
Start: 2022-05-18 | End: 2022-05-18 | Stop reason: HOSPADM

## 2022-05-18 RX ORDER — FENTANYL CITRATE 50 UG/ML
INJECTION, SOLUTION INTRAMUSCULAR; INTRAVENOUS AS NEEDED
Status: DISCONTINUED | OUTPATIENT
Start: 2022-05-18 | End: 2022-05-18 | Stop reason: SURG

## 2022-05-18 RX ORDER — LIDOCAINE HYDROCHLORIDE 20 MG/ML
INJECTION, SOLUTION INFILTRATION; PERINEURAL AS NEEDED
Status: DISCONTINUED | OUTPATIENT
Start: 2022-05-18 | End: 2022-05-18 | Stop reason: SURG

## 2022-05-18 RX ORDER — FAMOTIDINE 10 MG/ML
INJECTION, SOLUTION INTRAVENOUS AS NEEDED
Status: DISCONTINUED | OUTPATIENT
Start: 2022-05-18 | End: 2022-05-18 | Stop reason: SURG

## 2022-05-18 RX ORDER — SODIUM CHLORIDE 0.9 % (FLUSH) 0.9 %
10 SYRINGE (ML) INJECTION EVERY 12 HOURS SCHEDULED
Status: DISCONTINUED | OUTPATIENT
Start: 2022-05-18 | End: 2022-05-18 | Stop reason: HOSPADM

## 2022-05-18 RX ORDER — VANCOMYCIN HYDROCHLORIDE 1 G/20ML
INJECTION, POWDER, LYOPHILIZED, FOR SOLUTION INTRAVENOUS AS NEEDED
Status: DISCONTINUED | OUTPATIENT
Start: 2022-05-18 | End: 2022-05-18 | Stop reason: HOSPADM

## 2022-05-18 RX ORDER — ONDANSETRON 2 MG/ML
INJECTION INTRAMUSCULAR; INTRAVENOUS AS NEEDED
Status: DISCONTINUED | OUTPATIENT
Start: 2022-05-18 | End: 2022-05-18 | Stop reason: SURG

## 2022-05-18 RX ORDER — SODIUM CHLORIDE 450 MG/100ML
90 INJECTION, SOLUTION INTRAVENOUS CONTINUOUS
Status: DISCONTINUED | OUTPATIENT
Start: 2022-05-18 | End: 2022-05-20

## 2022-05-18 RX ORDER — PROPOFOL 10 MG/ML
VIAL (ML) INTRAVENOUS AS NEEDED
Status: DISCONTINUED | OUTPATIENT
Start: 2022-05-18 | End: 2022-05-18 | Stop reason: SURG

## 2022-05-18 RX ORDER — CHOLECALCIFEROL (VITAMIN D3) 125 MCG
10 CAPSULE ORAL NIGHTLY PRN
Status: DISCONTINUED | OUTPATIENT
Start: 2022-05-18 | End: 2022-06-02 | Stop reason: HOSPADM

## 2022-05-18 RX ORDER — MIDAZOLAM HYDROCHLORIDE 1 MG/ML
1 INJECTION INTRAMUSCULAR; INTRAVENOUS
Status: DISCONTINUED | OUTPATIENT
Start: 2022-05-18 | End: 2022-05-18 | Stop reason: HOSPADM

## 2022-05-18 RX ORDER — SODIUM CHLORIDE 0.9 % (FLUSH) 0.9 %
10 SYRINGE (ML) INJECTION AS NEEDED
Status: DISCONTINUED | OUTPATIENT
Start: 2022-05-18 | End: 2022-05-18 | Stop reason: HOSPADM

## 2022-05-18 RX ORDER — MEPERIDINE HYDROCHLORIDE 25 MG/ML
12.5 INJECTION INTRAMUSCULAR; INTRAVENOUS; SUBCUTANEOUS
Status: DISCONTINUED | OUTPATIENT
Start: 2022-05-18 | End: 2022-05-18 | Stop reason: HOSPADM

## 2022-05-18 RX ADMIN — VANCOMYCIN HYDROCHLORIDE 1750 MG: 5 INJECTION, POWDER, LYOPHILIZED, FOR SOLUTION INTRAVENOUS at 12:40

## 2022-05-18 RX ADMIN — Medication 1 TABLET: at 08:53

## 2022-05-18 RX ADMIN — MORPHINE SULFATE 2 MG: 2 INJECTION, SOLUTION INTRAMUSCULAR; INTRAVENOUS at 17:52

## 2022-05-18 RX ADMIN — ATORVASTATIN CALCIUM 10 MG: 10 TABLET, FILM COATED ORAL at 08:53

## 2022-05-18 RX ADMIN — PROPOFOL 100 MG: 10 INJECTION, EMULSION INTRAVENOUS at 14:08

## 2022-05-18 RX ADMIN — Medication 10 MG: at 23:14

## 2022-05-18 RX ADMIN — Medication 10 ML: at 21:06

## 2022-05-18 RX ADMIN — OXYCODONE HYDROCHLORIDE AND ACETAMINOPHEN 1 TABLET: 5; 325 TABLET ORAL at 17:20

## 2022-05-18 RX ADMIN — MIDAZOLAM 2 MG: 1 INJECTION INTRAMUSCULAR; INTRAVENOUS at 14:02

## 2022-05-18 RX ADMIN — LIDOCAINE HYDROCHLORIDE 20 MG: 20 INJECTION, SOLUTION INFILTRATION; PERINEURAL at 14:08

## 2022-05-18 RX ADMIN — FENTANYL CITRATE 50 MCG: 50 INJECTION INTRAMUSCULAR; INTRAVENOUS at 14:02

## 2022-05-18 RX ADMIN — DIPHENHYDRAMINE HYDROCHLORIDE 25 MG: 50 INJECTION, SOLUTION INTRAMUSCULAR; INTRAVENOUS at 18:32

## 2022-05-18 RX ADMIN — HYDROCODONE BITARTRATE AND ACETAMINOPHEN 1 TABLET: 7.5; 325 TABLET ORAL at 23:14

## 2022-05-18 RX ADMIN — SODIUM CHLORIDE 90 ML/HR: 4.5 INJECTION, SOLUTION INTRAVENOUS at 05:32

## 2022-05-18 RX ADMIN — INSULIN DETEMIR 15 UNITS: 100 INJECTION, SOLUTION SUBCUTANEOUS at 08:53

## 2022-05-18 RX ADMIN — HYDROMORPHONE HYDROCHLORIDE 1 MG: 1 INJECTION, SOLUTION INTRAMUSCULAR; INTRAVENOUS; SUBCUTANEOUS at 16:48

## 2022-05-18 RX ADMIN — MORPHINE SULFATE 2 MG: 2 INJECTION, SOLUTION INTRAMUSCULAR; INTRAVENOUS at 22:28

## 2022-05-18 RX ADMIN — FAMOTIDINE 20 MG: 10 INJECTION INTRAVENOUS at 14:02

## 2022-05-18 RX ADMIN — ONDANSETRON 4 MG: 2 INJECTION INTRAMUSCULAR; INTRAVENOUS at 14:02

## 2022-05-18 RX ADMIN — FENTANYL CITRATE 50 MCG: 50 INJECTION INTRAMUSCULAR; INTRAVENOUS at 14:40

## 2022-05-18 RX ADMIN — FENTANYL CITRATE 50 MCG: 50 INJECTION INTRAMUSCULAR; INTRAVENOUS at 17:00

## 2022-05-18 RX ADMIN — SODIUM CHLORIDE, POTASSIUM CHLORIDE, SODIUM LACTATE AND CALCIUM CHLORIDE: 600; 310; 30; 20 INJECTION, SOLUTION INTRAVENOUS at 14:02

## 2022-05-18 NOTE — ANESTHESIA PREPROCEDURE EVALUATION
Anesthesia Evaluation     Patient summary reviewed and Nursing notes reviewed   no history of anesthetic complications:  NPO Solid Status: > 8 hours  NPO Liquid Status: > 8 hours           Airway   Mallampati: III  TM distance: >3 FB  Neck ROM: full  Dental    (+) poor dentition    Pulmonary - negative pulmonary ROS and normal exam   Cardiovascular - normal exam    (+) hypertension, hyperlipidemia,       Neuro/Psych- negative ROS  GI/Hepatic/Renal/Endo    (+) obesity, morbid obesity,  renal disease CRI, diabetes mellitus type 2 poorly controlled using insulin,     Musculoskeletal (-) negative ROS    Abdominal   (+) obese,    Substance History - negative use     OB/GYN negative ob/gyn ROS         Other - negative ROS                         Anesthesia Plan    ASA 3     general   (Sedation with block)  intravenous induction     Anesthetic plan, all risks, benefits, and alternatives have been provided, discussed and informed consent has been obtained with: patient.  Use of blood products discussed with patient  Consented to blood products.       CODE STATUS:      FULL      Lab Results   Component Value Date    WBC 5.43 05/18/2022    HGB 8.9 (L) 05/18/2022    HCT 27.6 (L) 05/18/2022    MCV 78.6 (L) 05/18/2022     (L) 05/18/2022       Lab Results   Component Value Date    GLUCOSE 135 (H) 05/18/2022    BUN 13 05/18/2022    CREATININE 0.65 (L) 05/18/2022    BCR 20.0 05/18/2022    K 3.7 05/18/2022    CO2 23.6 05/18/2022    CALCIUM 7.7 (L) 05/18/2022    ALBUMIN 1.73 (L) 05/16/2022    AST 40 05/16/2022    ALT 27 05/16/2022

## 2022-05-18 NOTE — ANESTHESIA POSTPROCEDURE EVALUATION
Patient: Melchor Hardwick    Procedure Summary     Date: 05/18/22 Room / Location: [facility] OR 02 /  [facility] OR    Anesthesia Start: 1402 Anesthesia Stop: 1621    Procedure: AMPUTATION FOOT (Left Foot) Diagnosis:       Acute hematogenous osteomyelitis of left foot (HCC)      (Acute hematogenous osteomyelitis of left foot (HCC) [M86.072])    Surgeons: Addison Colby MD Provider: Long Carter MD    Anesthesia Type: general ASA Status: 3          Anesthesia Type: general    Vitals  Vitals Value Taken Time   /78 05/18/22 1717   Temp 97.4 °F (36.3 °C) 05/18/22 1652   Pulse 81 05/18/22 1717   Resp 13 05/18/22 1717   SpO2 92 % 05/18/22 1717           Post Anesthesia Care and Evaluation    Patient location during evaluation: PHASE II  Patient participation: complete - patient participated  Level of consciousness: awake and alert  Pain score: 1  Pain management: adequate  Airway patency: patent  Anesthetic complications: No anesthetic complications  PONV Status: controlled  Cardiovascular status: acceptable  Respiratory status: acceptable  Hydration status: acceptable

## 2022-05-18 NOTE — ANESTHESIA PROCEDURE NOTES
Airway  Urgency: elective    Date/Time: 5/18/2022 2:09 PM  Airway not difficult    General Information and Staff    Patient location during procedure: OR  CRNA/CAA: Mony Tavarez CRNA    Indications and Patient Condition  Indications for airway management: airway protection    Preoxygenated: yes  MILS maintained throughout  Mask difficulty assessment: 0 - not attempted    Final Airway Details  Final airway type: supraglottic airway      Successful airway: unique  Size 4    Number of attempts at approach: 1  Assessment: lips, teeth, and gum same as pre-op

## 2022-05-19 LAB
ALBUMIN SERPL-MCNC: 1.85 G/DL (ref 3.5–5.2)
ALBUMIN/GLOB SERPL: 0.5 G/DL
ALP SERPL-CCNC: 235 U/L (ref 39–117)
ALT SERPL W P-5'-P-CCNC: 29 U/L (ref 1–41)
ANION GAP SERPL CALCULATED.3IONS-SCNC: 9.7 MMOL/L (ref 5–15)
AST SERPL-CCNC: 39 U/L (ref 1–40)
BASOPHILS # BLD AUTO: 0.03 10*3/MM3 (ref 0–0.2)
BASOPHILS NFR BLD AUTO: 0.4 % (ref 0–1.5)
BILIRUB SERPL-MCNC: 0.6 MG/DL (ref 0–1.2)
BUN SERPL-MCNC: 10 MG/DL (ref 6–20)
BUN/CREAT SERPL: 16.4 (ref 7–25)
CALCIUM SPEC-SCNC: 7.7 MG/DL (ref 8.6–10.5)
CHLORIDE SERPL-SCNC: 101 MMOL/L (ref 98–107)
CO2 SERPL-SCNC: 24.3 MMOL/L (ref 22–29)
CREAT SERPL-MCNC: 0.61 MG/DL (ref 0.76–1.27)
DEPRECATED RDW RBC AUTO: 44.7 FL (ref 37–54)
EGFRCR SERPLBLD CKD-EPI 2021: 112 ML/MIN/1.73
EOSINOPHIL # BLD AUTO: 0.07 10*3/MM3 (ref 0–0.4)
EOSINOPHIL NFR BLD AUTO: 0.9 % (ref 0.3–6.2)
ERYTHROCYTE [DISTWIDTH] IN BLOOD BY AUTOMATED COUNT: 15.7 % (ref 12.3–15.4)
GLOBULIN UR ELPH-MCNC: 3.9 GM/DL
GLUCOSE BLDC GLUCOMTR-MCNC: 122 MG/DL (ref 70–130)
GLUCOSE BLDC GLUCOMTR-MCNC: 200 MG/DL (ref 70–130)
GLUCOSE BLDC GLUCOMTR-MCNC: 232 MG/DL (ref 70–130)
GLUCOSE BLDC GLUCOMTR-MCNC: 245 MG/DL (ref 70–130)
GLUCOSE SERPL-MCNC: 124 MG/DL (ref 65–99)
HCT VFR BLD AUTO: 29.6 % (ref 37.5–51)
HGB BLD-MCNC: 9.4 G/DL (ref 13–17.7)
IMM GRANULOCYTES # BLD AUTO: 0.02 10*3/MM3 (ref 0–0.05)
IMM GRANULOCYTES NFR BLD AUTO: 0.3 % (ref 0–0.5)
LYMPHOCYTES # BLD AUTO: 0.88 10*3/MM3 (ref 0.7–3.1)
LYMPHOCYTES NFR BLD AUTO: 11.6 % (ref 19.6–45.3)
MCH RBC QN AUTO: 25.1 PG (ref 26.6–33)
MCHC RBC AUTO-ENTMCNC: 31.8 G/DL (ref 31.5–35.7)
MCV RBC AUTO: 78.9 FL (ref 79–97)
MONOCYTES # BLD AUTO: 0.5 10*3/MM3 (ref 0.1–0.9)
MONOCYTES NFR BLD AUTO: 6.6 % (ref 5–12)
NEUTROPHILS NFR BLD AUTO: 6.09 10*3/MM3 (ref 1.7–7)
NEUTROPHILS NFR BLD AUTO: 80.2 % (ref 42.7–76)
NRBC BLD AUTO-RTO: 0 /100 WBC (ref 0–0.2)
PLATELET # BLD AUTO: 128 10*3/MM3 (ref 140–450)
PMV BLD AUTO: 9.6 FL (ref 6–12)
POTASSIUM SERPL-SCNC: 3.9 MMOL/L (ref 3.5–5.2)
PROT SERPL-MCNC: 5.7 G/DL (ref 6–8.5)
RBC # BLD AUTO: 3.75 10*6/MM3 (ref 4.14–5.8)
SODIUM SERPL-SCNC: 135 MMOL/L (ref 136–145)
VANCOMYCIN TROUGH SERPL-MCNC: 17.4 MCG/ML (ref 5–20)
WBC NRBC COR # BLD: 7.59 10*3/MM3 (ref 3.4–10.8)

## 2022-05-19 PROCEDURE — 63710000001 INSULIN ASPART PER 5 UNITS: Performed by: PODIATRIST

## 2022-05-19 PROCEDURE — 97535 SELF CARE MNGMENT TRAINING: CPT

## 2022-05-19 PROCEDURE — 82962 GLUCOSE BLOOD TEST: CPT

## 2022-05-19 PROCEDURE — 25010000002 MORPHINE PER 10 MG: Performed by: PODIATRIST

## 2022-05-19 PROCEDURE — 63710000001 INSULIN DETEMIR PER 5 UNITS: Performed by: PODIATRIST

## 2022-05-19 PROCEDURE — 99231 SBSQ HOSP IP/OBS SF/LOW 25: CPT | Performed by: INTERNAL MEDICINE

## 2022-05-19 PROCEDURE — 85025 COMPLETE CBC W/AUTO DIFF WBC: CPT | Performed by: INTERNAL MEDICINE

## 2022-05-19 PROCEDURE — 97110 THERAPEUTIC EXERCISES: CPT

## 2022-05-19 PROCEDURE — 25010000002 MORPHINE PER 10 MG: Performed by: INTERNAL MEDICINE

## 2022-05-19 PROCEDURE — 97530 THERAPEUTIC ACTIVITIES: CPT

## 2022-05-19 PROCEDURE — 25010000002 DAPTOMYCIN PER 1 MG: Performed by: INTERNAL MEDICINE

## 2022-05-19 PROCEDURE — 80053 COMPREHEN METABOLIC PANEL: CPT | Performed by: INTERNAL MEDICINE

## 2022-05-19 PROCEDURE — 25010000002 VANCOMYCIN 5 G RECONSTITUTED SOLUTION: Performed by: INTERNAL MEDICINE

## 2022-05-19 PROCEDURE — 80202 ASSAY OF VANCOMYCIN: CPT

## 2022-05-19 RX ADMIN — MORPHINE SULFATE 4 MG: 4 INJECTION, SOLUTION INTRAMUSCULAR; INTRAVENOUS at 09:01

## 2022-05-19 RX ADMIN — Medication 1 TABLET: at 08:08

## 2022-05-19 RX ADMIN — INSULIN ASPART 5 UNITS: 100 INJECTION, SOLUTION INTRAVENOUS; SUBCUTANEOUS at 08:08

## 2022-05-19 RX ADMIN — DAPTOMYCIN 550 MG: 500 INJECTION, POWDER, LYOPHILIZED, FOR SOLUTION INTRAVENOUS at 22:29

## 2022-05-19 RX ADMIN — HYDROCODONE BITARTRATE AND ACETAMINOPHEN 1 TABLET: 7.5; 325 TABLET ORAL at 10:50

## 2022-05-19 RX ADMIN — INSULIN DETEMIR 15 UNITS: 100 INJECTION, SOLUTION SUBCUTANEOUS at 08:08

## 2022-05-19 RX ADMIN — MORPHINE SULFATE 4 MG: 4 INJECTION, SOLUTION INTRAMUSCULAR; INTRAVENOUS at 13:12

## 2022-05-19 RX ADMIN — VANCOMYCIN HYDROCHLORIDE 1750 MG: 5 INJECTION, POWDER, LYOPHILIZED, FOR SOLUTION INTRAVENOUS at 01:15

## 2022-05-19 RX ADMIN — ATORVASTATIN CALCIUM 10 MG: 10 TABLET, FILM COATED ORAL at 08:08

## 2022-05-19 RX ADMIN — SODIUM CHLORIDE 90 ML/HR: 4.5 INJECTION, SOLUTION INTRAVENOUS at 05:56

## 2022-05-19 RX ADMIN — PANTOPRAZOLE SODIUM 40 MG: 40 TABLET, DELAYED RELEASE ORAL at 05:53

## 2022-05-19 RX ADMIN — Medication 10 ML: at 22:28

## 2022-05-19 RX ADMIN — INSULIN ASPART 5 UNITS: 100 INJECTION, SOLUTION INTRAVENOUS; SUBCUTANEOUS at 17:12

## 2022-05-19 RX ADMIN — HYDROCODONE BITARTRATE AND ACETAMINOPHEN 1 TABLET: 7.5; 325 TABLET ORAL at 22:44

## 2022-05-19 RX ADMIN — INSULIN ASPART 5 UNITS: 100 INJECTION, SOLUTION INTRAVENOUS; SUBCUTANEOUS at 11:37

## 2022-05-19 RX ADMIN — MORPHINE SULFATE 2 MG: 2 INJECTION, SOLUTION INTRAMUSCULAR; INTRAVENOUS at 03:18

## 2022-05-19 RX ADMIN — MORPHINE SULFATE 2 MG: 2 INJECTION, SOLUTION INTRAMUSCULAR; INTRAVENOUS at 07:28

## 2022-05-19 RX ADMIN — INSULIN ASPART 5 UNITS: 100 INJECTION, SOLUTION INTRAVENOUS; SUBCUTANEOUS at 17:13

## 2022-05-19 RX ADMIN — VANCOMYCIN HYDROCHLORIDE 1750 MG: 5 INJECTION, POWDER, LYOPHILIZED, FOR SOLUTION INTRAVENOUS at 11:37

## 2022-05-20 ENCOUNTER — APPOINTMENT (OUTPATIENT)
Dept: ULTRASOUND IMAGING | Facility: HOSPITAL | Age: 57
End: 2022-05-20

## 2022-05-20 LAB
ALBUMIN SERPL-MCNC: 1.84 G/DL (ref 3.5–5.2)
ALBUMIN/GLOB SERPL: 0.6 G/DL
ALP SERPL-CCNC: 258 U/L (ref 39–117)
ALT SERPL W P-5'-P-CCNC: 25 U/L (ref 1–41)
ANION GAP SERPL CALCULATED.3IONS-SCNC: 8.4 MMOL/L (ref 5–15)
AST SERPL-CCNC: 40 U/L (ref 1–40)
BASOPHILS # BLD AUTO: 0.02 10*3/MM3 (ref 0–0.2)
BASOPHILS NFR BLD AUTO: 0.3 % (ref 0–1.5)
BILIRUB SERPL-MCNC: 0.5 MG/DL (ref 0–1.2)
BUN SERPL-MCNC: 15 MG/DL (ref 6–20)
BUN/CREAT SERPL: 21.4 (ref 7–25)
CALCIUM SPEC-SCNC: 7.5 MG/DL (ref 8.6–10.5)
CHLORIDE SERPL-SCNC: 98 MMOL/L (ref 98–107)
CO2 SERPL-SCNC: 24.6 MMOL/L (ref 22–29)
CREAT SERPL-MCNC: 0.7 MG/DL (ref 0.76–1.27)
DEPRECATED RDW RBC AUTO: 44.4 FL (ref 37–54)
EGFRCR SERPLBLD CKD-EPI 2021: 107.5 ML/MIN/1.73
EOSINOPHIL # BLD AUTO: 0.08 10*3/MM3 (ref 0–0.4)
EOSINOPHIL NFR BLD AUTO: 1.4 % (ref 0.3–6.2)
ERYTHROCYTE [DISTWIDTH] IN BLOOD BY AUTOMATED COUNT: 15.7 % (ref 12.3–15.4)
GLOBULIN UR ELPH-MCNC: 3.3 GM/DL
GLUCOSE BLDC GLUCOMTR-MCNC: 193 MG/DL (ref 70–130)
GLUCOSE BLDC GLUCOMTR-MCNC: 236 MG/DL (ref 70–130)
GLUCOSE BLDC GLUCOMTR-MCNC: 244 MG/DL (ref 70–130)
GLUCOSE BLDC GLUCOMTR-MCNC: 279 MG/DL (ref 70–130)
GLUCOSE SERPL-MCNC: 232 MG/DL (ref 65–99)
HCT VFR BLD AUTO: 26.1 % (ref 37.5–51)
HGB BLD-MCNC: 8.3 G/DL (ref 13–17.7)
HIV1+2 AB SER QL: NORMAL
IMM GRANULOCYTES # BLD AUTO: 0.02 10*3/MM3 (ref 0–0.05)
IMM GRANULOCYTES NFR BLD AUTO: 0.3 % (ref 0–0.5)
INR PPP: 1.21 (ref 0.9–1.1)
LYMPHOCYTES # BLD AUTO: 1.27 10*3/MM3 (ref 0.7–3.1)
LYMPHOCYTES NFR BLD AUTO: 21.9 % (ref 19.6–45.3)
MCH RBC QN AUTO: 25.1 PG (ref 26.6–33)
MCHC RBC AUTO-ENTMCNC: 31.8 G/DL (ref 31.5–35.7)
MCV RBC AUTO: 78.9 FL (ref 79–97)
MONOCYTES # BLD AUTO: 0.53 10*3/MM3 (ref 0.1–0.9)
MONOCYTES NFR BLD AUTO: 9.1 % (ref 5–12)
NEUTROPHILS NFR BLD AUTO: 3.88 10*3/MM3 (ref 1.7–7)
NEUTROPHILS NFR BLD AUTO: 67 % (ref 42.7–76)
NRBC BLD AUTO-RTO: 0 /100 WBC (ref 0–0.2)
PLATELET # BLD AUTO: 122 10*3/MM3 (ref 140–450)
PMV BLD AUTO: 10.2 FL (ref 6–12)
POTASSIUM SERPL-SCNC: 3.8 MMOL/L (ref 3.5–5.2)
PROT SERPL-MCNC: 5.1 G/DL (ref 6–8.5)
PROTHROMBIN TIME: 15.5 SECONDS (ref 12.1–14.7)
RBC # BLD AUTO: 3.31 10*6/MM3 (ref 4.14–5.8)
SODIUM SERPL-SCNC: 131 MMOL/L (ref 136–145)
WBC NRBC COR # BLD: 5.8 10*3/MM3 (ref 3.4–10.8)

## 2022-05-20 PROCEDURE — 25010000002 DAPTOMYCIN PER 1 MG: Performed by: INTERNAL MEDICINE

## 2022-05-20 PROCEDURE — 80053 COMPREHEN METABOLIC PANEL: CPT | Performed by: INTERNAL MEDICINE

## 2022-05-20 PROCEDURE — 85025 COMPLETE CBC W/AUTO DIFF WBC: CPT | Performed by: INTERNAL MEDICINE

## 2022-05-20 PROCEDURE — 63710000001 INSULIN ASPART PER 5 UNITS: Performed by: PODIATRIST

## 2022-05-20 PROCEDURE — 25010000002 MORPHINE PER 10 MG: Performed by: INTERNAL MEDICINE

## 2022-05-20 PROCEDURE — 76870 US EXAM SCROTUM: CPT | Performed by: RADIOLOGY

## 2022-05-20 PROCEDURE — 63710000001 INSULIN DETEMIR PER 5 UNITS: Performed by: INTERNAL MEDICINE

## 2022-05-20 PROCEDURE — 76700 US EXAM ABDOM COMPLETE: CPT

## 2022-05-20 PROCEDURE — C1751 CATH, INF, PER/CENT/MIDLINE: HCPCS

## 2022-05-20 PROCEDURE — 76700 US EXAM ABDOM COMPLETE: CPT | Performed by: RADIOLOGY

## 2022-05-20 PROCEDURE — 25010000002 FUROSEMIDE PER 20 MG: Performed by: INTERNAL MEDICINE

## 2022-05-20 PROCEDURE — 02HV33Z INSERTION OF INFUSION DEVICE INTO SUPERIOR VENA CAVA, PERCUTANEOUS APPROACH: ICD-10-PCS | Performed by: STUDENT IN AN ORGANIZED HEALTH CARE EDUCATION/TRAINING PROGRAM

## 2022-05-20 PROCEDURE — 99231 SBSQ HOSP IP/OBS SF/LOW 25: CPT | Performed by: INTERNAL MEDICINE

## 2022-05-20 PROCEDURE — 76870 US EXAM SCROTUM: CPT

## 2022-05-20 PROCEDURE — G0432 EIA HIV-1/HIV-2 SCREEN: HCPCS | Performed by: INTERNAL MEDICINE

## 2022-05-20 PROCEDURE — 82962 GLUCOSE BLOOD TEST: CPT

## 2022-05-20 PROCEDURE — 85610 PROTHROMBIN TIME: CPT | Performed by: INTERNAL MEDICINE

## 2022-05-20 RX ORDER — FUROSEMIDE 10 MG/ML
40 INJECTION INTRAMUSCULAR; INTRAVENOUS ONCE
Status: COMPLETED | OUTPATIENT
Start: 2022-05-20 | End: 2022-05-20

## 2022-05-20 RX ADMIN — Medication 10 ML: at 20:55

## 2022-05-20 RX ADMIN — INSULIN ASPART 5 UNITS: 100 INJECTION, SOLUTION INTRAVENOUS; SUBCUTANEOUS at 17:55

## 2022-05-20 RX ADMIN — MORPHINE SULFATE 4 MG: 4 INJECTION, SOLUTION INTRAMUSCULAR; INTRAVENOUS at 14:18

## 2022-05-20 RX ADMIN — MORPHINE SULFATE 4 MG: 4 INJECTION, SOLUTION INTRAMUSCULAR; INTRAVENOUS at 05:52

## 2022-05-20 RX ADMIN — Medication 10 ML: at 08:34

## 2022-05-20 RX ADMIN — Medication 1 TABLET: at 08:34

## 2022-05-20 RX ADMIN — INSULIN ASPART 8 UNITS: 100 INJECTION, SOLUTION INTRAVENOUS; SUBCUTANEOUS at 13:01

## 2022-05-20 RX ADMIN — Medication 10 ML: at 08:35

## 2022-05-20 RX ADMIN — ATORVASTATIN CALCIUM 10 MG: 10 TABLET, FILM COATED ORAL at 08:34

## 2022-05-20 RX ADMIN — INSULIN ASPART 5 UNITS: 100 INJECTION, SOLUTION INTRAVENOUS; SUBCUTANEOUS at 08:34

## 2022-05-20 RX ADMIN — INSULIN ASPART 5 UNITS: 100 INJECTION, SOLUTION INTRAVENOUS; SUBCUTANEOUS at 17:56

## 2022-05-20 RX ADMIN — INSULIN ASPART 4 UNITS: 100 INJECTION, SOLUTION INTRAVENOUS; SUBCUTANEOUS at 08:33

## 2022-05-20 RX ADMIN — FUROSEMIDE 40 MG: 10 INJECTION, SOLUTION INTRAMUSCULAR; INTRAVENOUS at 15:43

## 2022-05-20 RX ADMIN — INSULIN ASPART 5 UNITS: 100 INJECTION, SOLUTION INTRAVENOUS; SUBCUTANEOUS at 13:01

## 2022-05-20 RX ADMIN — HYDROCODONE BITARTRATE AND ACETAMINOPHEN 1 TABLET: 7.5; 325 TABLET ORAL at 17:56

## 2022-05-20 RX ADMIN — MORPHINE SULFATE 4 MG: 4 INJECTION, SOLUTION INTRAMUSCULAR; INTRAVENOUS at 21:00

## 2022-05-20 RX ADMIN — DAPTOMYCIN 750 MG: 500 INJECTION, POWDER, LYOPHILIZED, FOR SOLUTION INTRAVENOUS at 20:42

## 2022-05-20 RX ADMIN — HYDROCODONE BITARTRATE AND ACETAMINOPHEN 1 TABLET: 7.5; 325 TABLET ORAL at 08:43

## 2022-05-20 RX ADMIN — PANTOPRAZOLE SODIUM 40 MG: 40 TABLET, DELAYED RELEASE ORAL at 05:52

## 2022-05-20 RX ADMIN — INSULIN DETEMIR 25 UNITS: 100 INJECTION, SOLUTION SUBCUTANEOUS at 08:34

## 2022-05-21 LAB
ANION GAP SERPL CALCULATED.3IONS-SCNC: 6.6 MMOL/L (ref 5–15)
BACTERIA SPEC AEROBE CULT: ABNORMAL
BACTERIA SPEC AEROBE CULT: ABNORMAL
BASOPHILS # BLD AUTO: 0.02 10*3/MM3 (ref 0–0.2)
BASOPHILS NFR BLD AUTO: 0.4 % (ref 0–1.5)
BUN SERPL-MCNC: 11 MG/DL (ref 6–20)
BUN/CREAT SERPL: 17.7 (ref 7–25)
CALCIUM SPEC-SCNC: 7.4 MG/DL (ref 8.6–10.5)
CHLORIDE SERPL-SCNC: 95 MMOL/L (ref 98–107)
CO2 SERPL-SCNC: 26.4 MMOL/L (ref 22–29)
CREAT SERPL-MCNC: 0.62 MG/DL (ref 0.76–1.27)
DEPRECATED RDW RBC AUTO: 43.7 FL (ref 37–54)
EGFRCR SERPLBLD CKD-EPI 2021: 111.5 ML/MIN/1.73
EOSINOPHIL # BLD AUTO: 0.09 10*3/MM3 (ref 0–0.4)
EOSINOPHIL NFR BLD AUTO: 1.7 % (ref 0.3–6.2)
ERYTHROCYTE [DISTWIDTH] IN BLOOD BY AUTOMATED COUNT: 15.4 % (ref 12.3–15.4)
GLUCOSE BLDC GLUCOMTR-MCNC: 209 MG/DL (ref 70–130)
GLUCOSE BLDC GLUCOMTR-MCNC: 216 MG/DL (ref 70–130)
GLUCOSE BLDC GLUCOMTR-MCNC: 257 MG/DL (ref 70–130)
GLUCOSE BLDC GLUCOMTR-MCNC: 280 MG/DL (ref 70–130)
GLUCOSE SERPL-MCNC: 224 MG/DL (ref 65–99)
GRAM STN SPEC: ABNORMAL
HCT VFR BLD AUTO: 26.7 % (ref 37.5–51)
HGB BLD-MCNC: 8.6 G/DL (ref 13–17.7)
IMM GRANULOCYTES # BLD AUTO: 0.03 10*3/MM3 (ref 0–0.05)
IMM GRANULOCYTES NFR BLD AUTO: 0.6 % (ref 0–0.5)
LYMPHOCYTES # BLD AUTO: 0.94 10*3/MM3 (ref 0.7–3.1)
LYMPHOCYTES NFR BLD AUTO: 17.7 % (ref 19.6–45.3)
MAGNESIUM SERPL-MCNC: 1.6 MG/DL (ref 1.6–2.6)
MCH RBC QN AUTO: 25.1 PG (ref 26.6–33)
MCHC RBC AUTO-ENTMCNC: 32.2 G/DL (ref 31.5–35.7)
MCV RBC AUTO: 78.1 FL (ref 79–97)
MONOCYTES # BLD AUTO: 0.47 10*3/MM3 (ref 0.1–0.9)
MONOCYTES NFR BLD AUTO: 8.8 % (ref 5–12)
NEUTROPHILS NFR BLD AUTO: 3.77 10*3/MM3 (ref 1.7–7)
NEUTROPHILS NFR BLD AUTO: 70.8 % (ref 42.7–76)
NRBC BLD AUTO-RTO: 0 /100 WBC (ref 0–0.2)
PLATELET # BLD AUTO: 130 10*3/MM3 (ref 140–450)
PMV BLD AUTO: 10.5 FL (ref 6–12)
POTASSIUM SERPL-SCNC: 3.5 MMOL/L (ref 3.5–5.2)
RBC # BLD AUTO: 3.42 10*6/MM3 (ref 4.14–5.8)
SODIUM SERPL-SCNC: 128 MMOL/L (ref 136–145)
WBC NRBC COR # BLD: 5.32 10*3/MM3 (ref 3.4–10.8)

## 2022-05-21 PROCEDURE — 99232 SBSQ HOSP IP/OBS MODERATE 35: CPT | Performed by: INTERNAL MEDICINE

## 2022-05-21 PROCEDURE — 25010000002 DAPTOMYCIN PER 1 MG: Performed by: INTERNAL MEDICINE

## 2022-05-21 PROCEDURE — 25010000002 FUROSEMIDE PER 20 MG: Performed by: INTERNAL MEDICINE

## 2022-05-21 PROCEDURE — 80048 BASIC METABOLIC PNL TOTAL CA: CPT | Performed by: INTERNAL MEDICINE

## 2022-05-21 PROCEDURE — 63710000001 INSULIN ASPART PER 5 UNITS: Performed by: PODIATRIST

## 2022-05-21 PROCEDURE — 82962 GLUCOSE BLOOD TEST: CPT

## 2022-05-21 PROCEDURE — 83735 ASSAY OF MAGNESIUM: CPT | Performed by: INTERNAL MEDICINE

## 2022-05-21 PROCEDURE — 63710000001 INSULIN DETEMIR PER 5 UNITS: Performed by: INTERNAL MEDICINE

## 2022-05-21 PROCEDURE — 85025 COMPLETE CBC W/AUTO DIFF WBC: CPT | Performed by: INTERNAL MEDICINE

## 2022-05-21 RX ORDER — FUROSEMIDE 10 MG/ML
40 INJECTION INTRAMUSCULAR; INTRAVENOUS ONCE
Status: COMPLETED | OUTPATIENT
Start: 2022-05-21 | End: 2022-05-21

## 2022-05-21 RX ADMIN — DAPTOMYCIN 750 MG: 500 INJECTION, POWDER, LYOPHILIZED, FOR SOLUTION INTRAVENOUS at 20:12

## 2022-05-21 RX ADMIN — FUROSEMIDE 40 MG: 10 INJECTION, SOLUTION INTRAMUSCULAR; INTRAVENOUS at 17:06

## 2022-05-21 RX ADMIN — Medication 10 ML: at 08:07

## 2022-05-21 RX ADMIN — HYDROCODONE BITARTRATE AND ACETAMINOPHEN 1 TABLET: 7.5; 325 TABLET ORAL at 02:42

## 2022-05-21 RX ADMIN — INSULIN ASPART 5 UNITS: 100 INJECTION, SOLUTION INTRAVENOUS; SUBCUTANEOUS at 08:07

## 2022-05-21 RX ADMIN — INSULIN ASPART 5 UNITS: 100 INJECTION, SOLUTION INTRAVENOUS; SUBCUTANEOUS at 17:06

## 2022-05-21 RX ADMIN — INSULIN ASPART 8 UNITS: 100 INJECTION, SOLUTION INTRAVENOUS; SUBCUTANEOUS at 11:58

## 2022-05-21 RX ADMIN — INSULIN ASPART 8 UNITS: 100 INJECTION, SOLUTION INTRAVENOUS; SUBCUTANEOUS at 17:06

## 2022-05-21 RX ADMIN — PANTOPRAZOLE SODIUM 40 MG: 40 TABLET, DELAYED RELEASE ORAL at 05:28

## 2022-05-21 RX ADMIN — INSULIN ASPART 5 UNITS: 100 INJECTION, SOLUTION INTRAVENOUS; SUBCUTANEOUS at 11:58

## 2022-05-21 RX ADMIN — Medication 10 ML: at 22:30

## 2022-05-21 RX ADMIN — Medication 1 TABLET: at 08:07

## 2022-05-21 RX ADMIN — Medication 10 ML: at 20:12

## 2022-05-21 RX ADMIN — INSULIN ASPART 5 UNITS: 100 INJECTION, SOLUTION INTRAVENOUS; SUBCUTANEOUS at 08:06

## 2022-05-21 RX ADMIN — HYDROCODONE BITARTRATE AND ACETAMINOPHEN 1 TABLET: 7.5; 325 TABLET ORAL at 13:42

## 2022-05-21 RX ADMIN — INSULIN DETEMIR 25 UNITS: 100 INJECTION, SOLUTION SUBCUTANEOUS at 08:08

## 2022-05-21 RX ADMIN — HYDROCODONE BITARTRATE AND ACETAMINOPHEN 1 TABLET: 7.5; 325 TABLET ORAL at 21:21

## 2022-05-21 RX ADMIN — ATORVASTATIN CALCIUM 10 MG: 10 TABLET, FILM COATED ORAL at 08:07

## 2022-05-22 LAB
ANION GAP SERPL CALCULATED.3IONS-SCNC: 5.8 MMOL/L (ref 5–15)
BASOPHILS # BLD AUTO: 0.01 10*3/MM3 (ref 0–0.2)
BASOPHILS NFR BLD AUTO: 0.3 % (ref 0–1.5)
BUN SERPL-MCNC: 14 MG/DL (ref 6–20)
BUN/CREAT SERPL: 23.3 (ref 7–25)
CALCIUM SPEC-SCNC: 7.5 MG/DL (ref 8.6–10.5)
CHLORIDE SERPL-SCNC: 95 MMOL/L (ref 98–107)
CO2 SERPL-SCNC: 28.2 MMOL/L (ref 22–29)
CREAT SERPL-MCNC: 0.6 MG/DL (ref 0.76–1.27)
DEPRECATED RDW RBC AUTO: 44.1 FL (ref 37–54)
EGFRCR SERPLBLD CKD-EPI 2021: 112.6 ML/MIN/1.73
EOSINOPHIL # BLD AUTO: 0.07 10*3/MM3 (ref 0–0.4)
EOSINOPHIL NFR BLD AUTO: 1.8 % (ref 0.3–6.2)
ERYTHROCYTE [DISTWIDTH] IN BLOOD BY AUTOMATED COUNT: 15.5 % (ref 12.3–15.4)
GLUCOSE BLDC GLUCOMTR-MCNC: 219 MG/DL (ref 70–130)
GLUCOSE BLDC GLUCOMTR-MCNC: 271 MG/DL (ref 70–130)
GLUCOSE BLDC GLUCOMTR-MCNC: 301 MG/DL (ref 70–130)
GLUCOSE BLDC GLUCOMTR-MCNC: 345 MG/DL (ref 70–130)
GLUCOSE SERPL-MCNC: 214 MG/DL (ref 65–99)
HCT VFR BLD AUTO: 26.6 % (ref 37.5–51)
HGB BLD-MCNC: 8.4 G/DL (ref 13–17.7)
IMM GRANULOCYTES # BLD AUTO: 0.01 10*3/MM3 (ref 0–0.05)
IMM GRANULOCYTES NFR BLD AUTO: 0.3 % (ref 0–0.5)
LYMPHOCYTES # BLD AUTO: 1.05 10*3/MM3 (ref 0.7–3.1)
LYMPHOCYTES NFR BLD AUTO: 26.3 % (ref 19.6–45.3)
MAGNESIUM SERPL-MCNC: 1.7 MG/DL (ref 1.6–2.6)
MCH RBC QN AUTO: 24.9 PG (ref 26.6–33)
MCHC RBC AUTO-ENTMCNC: 31.6 G/DL (ref 31.5–35.7)
MCV RBC AUTO: 78.9 FL (ref 79–97)
MONOCYTES # BLD AUTO: 0.38 10*3/MM3 (ref 0.1–0.9)
MONOCYTES NFR BLD AUTO: 9.5 % (ref 5–12)
NEUTROPHILS NFR BLD AUTO: 2.47 10*3/MM3 (ref 1.7–7)
NEUTROPHILS NFR BLD AUTO: 61.8 % (ref 42.7–76)
NRBC BLD AUTO-RTO: 0 /100 WBC (ref 0–0.2)
PLATELET # BLD AUTO: 115 10*3/MM3 (ref 140–450)
PMV BLD AUTO: 9.6 FL (ref 6–12)
POTASSIUM SERPL-SCNC: 3.6 MMOL/L (ref 3.5–5.2)
RBC # BLD AUTO: 3.37 10*6/MM3 (ref 4.14–5.8)
SODIUM SERPL-SCNC: 129 MMOL/L (ref 136–145)
WBC NRBC COR # BLD: 3.99 10*3/MM3 (ref 3.4–10.8)

## 2022-05-22 PROCEDURE — 63710000001 INSULIN DETEMIR PER 5 UNITS: Performed by: INTERNAL MEDICINE

## 2022-05-22 PROCEDURE — 63710000001 INSULIN ASPART PER 5 UNITS: Performed by: PODIATRIST

## 2022-05-22 PROCEDURE — 99231 SBSQ HOSP IP/OBS SF/LOW 25: CPT | Performed by: INTERNAL MEDICINE

## 2022-05-22 PROCEDURE — 82962 GLUCOSE BLOOD TEST: CPT

## 2022-05-22 PROCEDURE — 25010000002 FUROSEMIDE PER 20 MG: Performed by: INTERNAL MEDICINE

## 2022-05-22 PROCEDURE — 85025 COMPLETE CBC W/AUTO DIFF WBC: CPT | Performed by: INTERNAL MEDICINE

## 2022-05-22 PROCEDURE — 83735 ASSAY OF MAGNESIUM: CPT | Performed by: INTERNAL MEDICINE

## 2022-05-22 PROCEDURE — 80048 BASIC METABOLIC PNL TOTAL CA: CPT | Performed by: INTERNAL MEDICINE

## 2022-05-22 PROCEDURE — 25010000002 DAPTOMYCIN PER 1 MG: Performed by: INTERNAL MEDICINE

## 2022-05-22 RX ORDER — FUROSEMIDE 10 MG/ML
40 INJECTION INTRAMUSCULAR; INTRAVENOUS ONCE
Status: COMPLETED | OUTPATIENT
Start: 2022-05-22 | End: 2022-05-22

## 2022-05-22 RX ADMIN — FUROSEMIDE 40 MG: 10 INJECTION, SOLUTION INTRAMUSCULAR; INTRAVENOUS at 13:02

## 2022-05-22 RX ADMIN — INSULIN ASPART 10 UNITS: 100 INJECTION, SOLUTION INTRAVENOUS; SUBCUTANEOUS at 17:09

## 2022-05-22 RX ADMIN — INSULIN ASPART 5 UNITS: 100 INJECTION, SOLUTION INTRAVENOUS; SUBCUTANEOUS at 08:31

## 2022-05-22 RX ADMIN — DAPTOMYCIN 750 MG: 500 INJECTION, POWDER, LYOPHILIZED, FOR SOLUTION INTRAVENOUS at 21:02

## 2022-05-22 RX ADMIN — INSULIN DETEMIR 25 UNITS: 100 INJECTION, SOLUTION SUBCUTANEOUS at 08:32

## 2022-05-22 RX ADMIN — Medication 10 ML: at 08:32

## 2022-05-22 RX ADMIN — Medication 10 ML: at 21:03

## 2022-05-22 RX ADMIN — INSULIN ASPART 5 UNITS: 100 INJECTION, SOLUTION INTRAVENOUS; SUBCUTANEOUS at 11:43

## 2022-05-22 RX ADMIN — ATORVASTATIN CALCIUM 10 MG: 10 TABLET, FILM COATED ORAL at 08:31

## 2022-05-22 RX ADMIN — INSULIN ASPART 12 UNITS: 100 INJECTION, SOLUTION INTRAVENOUS; SUBCUTANEOUS at 11:42

## 2022-05-22 RX ADMIN — INSULIN ASPART 5 UNITS: 100 INJECTION, SOLUTION INTRAVENOUS; SUBCUTANEOUS at 17:09

## 2022-05-22 RX ADMIN — PANTOPRAZOLE SODIUM 40 MG: 40 TABLET, DELAYED RELEASE ORAL at 05:41

## 2022-05-22 RX ADMIN — Medication 1 TABLET: at 08:31

## 2022-05-23 ENCOUNTER — APPOINTMENT (OUTPATIENT)
Dept: ULTRASOUND IMAGING | Facility: HOSPITAL | Age: 57
End: 2022-05-23

## 2022-05-23 LAB
ALBUMIN SERPL-MCNC: 1.77 G/DL (ref 3.5–5.2)
ALBUMIN/GLOB SERPL: 0.5 G/DL
ALP SERPL-CCNC: 305 U/L (ref 39–117)
ALT SERPL W P-5'-P-CCNC: 27 U/L (ref 1–41)
ANION GAP SERPL CALCULATED.3IONS-SCNC: 9 MMOL/L (ref 5–15)
AST SERPL-CCNC: 42 U/L (ref 1–40)
BASOPHILS # BLD AUTO: 0.02 10*3/MM3 (ref 0–0.2)
BASOPHILS NFR BLD AUTO: 0.6 % (ref 0–1.5)
BILIRUB SERPL-MCNC: 0.4 MG/DL (ref 0–1.2)
BUN SERPL-MCNC: 17 MG/DL (ref 6–20)
BUN/CREAT SERPL: 26.6 (ref 7–25)
CALCIUM SPEC-SCNC: 7.7 MG/DL (ref 8.6–10.5)
CHLORIDE SERPL-SCNC: 96 MMOL/L (ref 98–107)
CK SERPL-CCNC: 39 U/L (ref 20–200)
CO2 SERPL-SCNC: 28 MMOL/L (ref 22–29)
CREAT SERPL-MCNC: 0.64 MG/DL (ref 0.76–1.27)
DEPRECATED RDW RBC AUTO: 43.7 FL (ref 37–54)
EGFRCR SERPLBLD CKD-EPI 2021: 110.4 ML/MIN/1.73
EOSINOPHIL # BLD AUTO: 0.08 10*3/MM3 (ref 0–0.4)
EOSINOPHIL NFR BLD AUTO: 2.2 % (ref 0.3–6.2)
ERYTHROCYTE [DISTWIDTH] IN BLOOD BY AUTOMATED COUNT: 15.5 % (ref 12.3–15.4)
GLOBULIN UR ELPH-MCNC: 3.5 GM/DL
GLUCOSE BLDC GLUCOMTR-MCNC: 242 MG/DL (ref 70–130)
GLUCOSE BLDC GLUCOMTR-MCNC: 273 MG/DL (ref 70–130)
GLUCOSE BLDC GLUCOMTR-MCNC: 283 MG/DL (ref 70–130)
GLUCOSE BLDC GLUCOMTR-MCNC: 314 MG/DL (ref 70–130)
GLUCOSE SERPL-MCNC: 304 MG/DL (ref 65–99)
HCT VFR BLD AUTO: 26.2 % (ref 37.5–51)
HGB BLD-MCNC: 8.4 G/DL (ref 13–17.7)
IMM GRANULOCYTES # BLD AUTO: 0.01 10*3/MM3 (ref 0–0.05)
IMM GRANULOCYTES NFR BLD AUTO: 0.3 % (ref 0–0.5)
LYMPHOCYTES # BLD AUTO: 1.02 10*3/MM3 (ref 0.7–3.1)
LYMPHOCYTES NFR BLD AUTO: 28.6 % (ref 19.6–45.3)
MAGNESIUM SERPL-MCNC: 1.8 MG/DL (ref 1.6–2.6)
MCH RBC QN AUTO: 25.1 PG (ref 26.6–33)
MCHC RBC AUTO-ENTMCNC: 32.1 G/DL (ref 31.5–35.7)
MCV RBC AUTO: 78.2 FL (ref 79–97)
MONOCYTES # BLD AUTO: 0.4 10*3/MM3 (ref 0.1–0.9)
MONOCYTES NFR BLD AUTO: 11.2 % (ref 5–12)
NEUTROPHILS NFR BLD AUTO: 2.04 10*3/MM3 (ref 1.7–7)
NEUTROPHILS NFR BLD AUTO: 57.1 % (ref 42.7–76)
NRBC BLD AUTO-RTO: 0 /100 WBC (ref 0–0.2)
PLATELET # BLD AUTO: 118 10*3/MM3 (ref 140–450)
PMV BLD AUTO: 10.5 FL (ref 6–12)
POTASSIUM SERPL-SCNC: 3.9 MMOL/L (ref 3.5–5.2)
PROT SERPL-MCNC: 5.3 G/DL (ref 6–8.5)
RBC # BLD AUTO: 3.35 10*6/MM3 (ref 4.14–5.8)
SODIUM SERPL-SCNC: 133 MMOL/L (ref 136–145)
WBC NRBC COR # BLD: 3.57 10*3/MM3 (ref 3.4–10.8)

## 2022-05-23 PROCEDURE — 99232 SBSQ HOSP IP/OBS MODERATE 35: CPT | Performed by: INTERNAL MEDICINE

## 2022-05-23 PROCEDURE — 80053 COMPREHEN METABOLIC PANEL: CPT | Performed by: INTERNAL MEDICINE

## 2022-05-23 PROCEDURE — 63710000001 INSULIN ASPART PER 5 UNITS: Performed by: INTERNAL MEDICINE

## 2022-05-23 PROCEDURE — 82962 GLUCOSE BLOOD TEST: CPT

## 2022-05-23 PROCEDURE — 25010000002 FUROSEMIDE PER 20 MG: Performed by: INTERNAL MEDICINE

## 2022-05-23 PROCEDURE — 83735 ASSAY OF MAGNESIUM: CPT | Performed by: INTERNAL MEDICINE

## 2022-05-23 PROCEDURE — 63710000001 INSULIN DETEMIR PER 5 UNITS: Performed by: INTERNAL MEDICINE

## 2022-05-23 PROCEDURE — 82550 ASSAY OF CK (CPK): CPT | Performed by: PHYSICIAN ASSISTANT

## 2022-05-23 PROCEDURE — 85025 COMPLETE CBC W/AUTO DIFF WBC: CPT | Performed by: INTERNAL MEDICINE

## 2022-05-23 PROCEDURE — 63710000001 INSULIN ASPART PER 5 UNITS: Performed by: PODIATRIST

## 2022-05-23 PROCEDURE — 25010000002 DAPTOMYCIN PER 1 MG: Performed by: INTERNAL MEDICINE

## 2022-05-23 PROCEDURE — 93970 EXTREMITY STUDY: CPT

## 2022-05-23 PROCEDURE — 25010000002 HEPARIN (PORCINE) PER 1000 UNITS: Performed by: INTERNAL MEDICINE

## 2022-05-23 PROCEDURE — 93970 EXTREMITY STUDY: CPT | Performed by: RADIOLOGY

## 2022-05-23 RX ORDER — HEPARIN SODIUM 5000 [USP'U]/ML
5000 INJECTION, SOLUTION INTRAVENOUS; SUBCUTANEOUS EVERY 12 HOURS
Status: DISCONTINUED | OUTPATIENT
Start: 2022-05-23 | End: 2022-05-30

## 2022-05-23 RX ORDER — INSULIN ASPART 100 [IU]/ML
10 INJECTION, SOLUTION INTRAVENOUS; SUBCUTANEOUS
Status: DISCONTINUED | OUTPATIENT
Start: 2022-05-23 | End: 2022-05-24

## 2022-05-23 RX ORDER — FUROSEMIDE 10 MG/ML
20 INJECTION INTRAMUSCULAR; INTRAVENOUS ONCE
Status: COMPLETED | OUTPATIENT
Start: 2022-05-23 | End: 2022-05-23

## 2022-05-23 RX ORDER — ASPIRIN 81 MG/1
81 TABLET ORAL DAILY
Status: DISCONTINUED | OUTPATIENT
Start: 2022-05-23 | End: 2022-06-02 | Stop reason: HOSPADM

## 2022-05-23 RX ADMIN — ASPIRIN 81 MG: 81 TABLET, COATED ORAL at 15:44

## 2022-05-23 RX ADMIN — Medication 10 ML: at 08:55

## 2022-05-23 RX ADMIN — INSULIN ASPART 8 UNITS: 100 INJECTION, SOLUTION INTRAVENOUS; SUBCUTANEOUS at 16:49

## 2022-05-23 RX ADMIN — DAPTOMYCIN 750 MG: 500 INJECTION, POWDER, LYOPHILIZED, FOR SOLUTION INTRAVENOUS at 20:11

## 2022-05-23 RX ADMIN — HEPARIN SODIUM 5000 UNITS: 5000 INJECTION INTRAVENOUS; SUBCUTANEOUS at 15:16

## 2022-05-23 RX ADMIN — INSULIN ASPART 5 UNITS: 100 INJECTION, SOLUTION INTRAVENOUS; SUBCUTANEOUS at 08:27

## 2022-05-23 RX ADMIN — INSULIN ASPART 10 UNITS: 100 INJECTION, SOLUTION INTRAVENOUS; SUBCUTANEOUS at 11:27

## 2022-05-23 RX ADMIN — Medication 1 TABLET: at 08:55

## 2022-05-23 RX ADMIN — INSULIN DETEMIR 25 UNITS: 100 INJECTION, SOLUTION SUBCUTANEOUS at 08:27

## 2022-05-23 RX ADMIN — INSULIN ASPART 10 UNITS: 100 INJECTION, SOLUTION INTRAVENOUS; SUBCUTANEOUS at 16:49

## 2022-05-23 RX ADMIN — INSULIN ASPART 8 UNITS: 100 INJECTION, SOLUTION INTRAVENOUS; SUBCUTANEOUS at 08:27

## 2022-05-23 RX ADMIN — PANTOPRAZOLE SODIUM 40 MG: 40 TABLET, DELAYED RELEASE ORAL at 05:24

## 2022-05-23 RX ADMIN — FUROSEMIDE 20 MG: 10 INJECTION, SOLUTION INTRAMUSCULAR; INTRAVENOUS at 14:21

## 2022-05-23 RX ADMIN — Medication 10 ML: at 20:11

## 2022-05-23 RX ADMIN — INSULIN ASPART 5 UNITS: 100 INJECTION, SOLUTION INTRAVENOUS; SUBCUTANEOUS at 11:27

## 2022-05-24 LAB
ANION GAP SERPL CALCULATED.3IONS-SCNC: 6.3 MMOL/L (ref 5–15)
BASOPHILS # BLD AUTO: 0.01 10*3/MM3 (ref 0–0.2)
BASOPHILS NFR BLD AUTO: 0.3 % (ref 0–1.5)
BUN SERPL-MCNC: 16 MG/DL (ref 6–20)
BUN/CREAT SERPL: 29.1 (ref 7–25)
CALCIUM SPEC-SCNC: 7.6 MG/DL (ref 8.6–10.5)
CHLORIDE SERPL-SCNC: 96 MMOL/L (ref 98–107)
CHOLEST SERPL-MCNC: 84 MG/DL (ref 0–200)
CO2 SERPL-SCNC: 27.7 MMOL/L (ref 22–29)
CREAT SERPL-MCNC: 0.55 MG/DL (ref 0.76–1.27)
CRP SERPL-MCNC: 7.1 MG/DL (ref 0–0.5)
DEPRECATED RDW RBC AUTO: 43.9 FL (ref 37–54)
EGFRCR SERPLBLD CKD-EPI 2021: 115.6 ML/MIN/1.73
EOSINOPHIL # BLD AUTO: 0.11 10*3/MM3 (ref 0–0.4)
EOSINOPHIL NFR BLD AUTO: 2.8 % (ref 0.3–6.2)
ERYTHROCYTE [DISTWIDTH] IN BLOOD BY AUTOMATED COUNT: 15.7 % (ref 12.3–15.4)
GLUCOSE BLDC GLUCOMTR-MCNC: 192 MG/DL (ref 70–130)
GLUCOSE BLDC GLUCOMTR-MCNC: 195 MG/DL (ref 70–130)
GLUCOSE BLDC GLUCOMTR-MCNC: 198 MG/DL (ref 70–130)
GLUCOSE BLDC GLUCOMTR-MCNC: 277 MG/DL (ref 70–130)
GLUCOSE SERPL-MCNC: 209 MG/DL (ref 65–99)
HCT VFR BLD AUTO: 27.5 % (ref 37.5–51)
HDLC SERPL-MCNC: 26 MG/DL (ref 40–60)
HGB BLD-MCNC: 8.9 G/DL (ref 13–17.7)
IMM GRANULOCYTES # BLD AUTO: 0.01 10*3/MM3 (ref 0–0.05)
IMM GRANULOCYTES NFR BLD AUTO: 0.3 % (ref 0–0.5)
LDLC SERPL CALC-MCNC: 44 MG/DL (ref 0–100)
LDLC/HDLC SERPL: 1.77 {RATIO}
LYMPHOCYTES # BLD AUTO: 1.24 10*3/MM3 (ref 0.7–3.1)
LYMPHOCYTES NFR BLD AUTO: 31.6 % (ref 19.6–45.3)
MCH RBC QN AUTO: 25.3 PG (ref 26.6–33)
MCHC RBC AUTO-ENTMCNC: 32.4 G/DL (ref 31.5–35.7)
MCV RBC AUTO: 78.1 FL (ref 79–97)
MONOCYTES # BLD AUTO: 0.32 10*3/MM3 (ref 0.1–0.9)
MONOCYTES NFR BLD AUTO: 8.2 % (ref 5–12)
NEUTROPHILS NFR BLD AUTO: 2.23 10*3/MM3 (ref 1.7–7)
NEUTROPHILS NFR BLD AUTO: 56.8 % (ref 42.7–76)
NRBC BLD AUTO-RTO: 0 /100 WBC (ref 0–0.2)
PLATELET # BLD AUTO: 124 10*3/MM3 (ref 140–450)
PMV BLD AUTO: 10 FL (ref 6–12)
POTASSIUM SERPL-SCNC: 3.8 MMOL/L (ref 3.5–5.2)
RBC # BLD AUTO: 3.52 10*6/MM3 (ref 4.14–5.8)
REF LAB TEST METHOD: NORMAL
SODIUM SERPL-SCNC: 130 MMOL/L (ref 136–145)
TRIGL SERPL-MCNC: 60 MG/DL (ref 0–150)
VLDLC SERPL-MCNC: 14 MG/DL (ref 5–40)
WBC NRBC COR # BLD: 3.92 10*3/MM3 (ref 3.4–10.8)

## 2022-05-24 PROCEDURE — 25010000002 HEPARIN (PORCINE) PER 1000 UNITS: Performed by: INTERNAL MEDICINE

## 2022-05-24 PROCEDURE — 99232 SBSQ HOSP IP/OBS MODERATE 35: CPT | Performed by: INTERNAL MEDICINE

## 2022-05-24 PROCEDURE — 82962 GLUCOSE BLOOD TEST: CPT

## 2022-05-24 PROCEDURE — 63710000001 INSULIN ASPART PER 5 UNITS: Performed by: PODIATRIST

## 2022-05-24 PROCEDURE — 25010000002 DAPTOMYCIN PER 1 MG: Performed by: INTERNAL MEDICINE

## 2022-05-24 PROCEDURE — 99252 IP/OBS CONSLTJ NEW/EST SF 35: CPT | Performed by: UROLOGY

## 2022-05-24 PROCEDURE — 80061 LIPID PANEL: CPT | Performed by: INTERNAL MEDICINE

## 2022-05-24 PROCEDURE — 63710000001 INSULIN DETEMIR PER 5 UNITS: Performed by: INTERNAL MEDICINE

## 2022-05-24 PROCEDURE — 86140 C-REACTIVE PROTEIN: CPT | Performed by: INTERNAL MEDICINE

## 2022-05-24 PROCEDURE — 63710000001 INSULIN ASPART PER 5 UNITS: Performed by: INTERNAL MEDICINE

## 2022-05-24 PROCEDURE — 25010000002 FUROSEMIDE PER 20 MG: Performed by: INTERNAL MEDICINE

## 2022-05-24 PROCEDURE — 84156 ASSAY OF PROTEIN URINE: CPT | Performed by: INTERNAL MEDICINE

## 2022-05-24 PROCEDURE — 82570 ASSAY OF URINE CREATININE: CPT | Performed by: INTERNAL MEDICINE

## 2022-05-24 PROCEDURE — 80048 BASIC METABOLIC PNL TOTAL CA: CPT | Performed by: INTERNAL MEDICINE

## 2022-05-24 PROCEDURE — 85025 COMPLETE CBC W/AUTO DIFF WBC: CPT | Performed by: INTERNAL MEDICINE

## 2022-05-24 RX ORDER — INSULIN ASPART 100 [IU]/ML
13 INJECTION, SOLUTION INTRAVENOUS; SUBCUTANEOUS
Status: DISCONTINUED | OUTPATIENT
Start: 2022-05-24 | End: 2022-05-25

## 2022-05-24 RX ORDER — FUROSEMIDE 10 MG/ML
20 INJECTION INTRAMUSCULAR; INTRAVENOUS EVERY 12 HOURS
Status: COMPLETED | OUTPATIENT
Start: 2022-05-24 | End: 2022-05-25

## 2022-05-24 RX ADMIN — ASPIRIN 81 MG: 81 TABLET, COATED ORAL at 08:16

## 2022-05-24 RX ADMIN — INSULIN ASPART 10 UNITS: 100 INJECTION, SOLUTION INTRAVENOUS; SUBCUTANEOUS at 11:02

## 2022-05-24 RX ADMIN — HEPARIN SODIUM 5000 UNITS: 5000 INJECTION INTRAVENOUS; SUBCUTANEOUS at 01:39

## 2022-05-24 RX ADMIN — INSULIN ASPART 3 UNITS: 100 INJECTION, SOLUTION INTRAVENOUS; SUBCUTANEOUS at 17:14

## 2022-05-24 RX ADMIN — FUROSEMIDE 20 MG: 10 INJECTION, SOLUTION INTRAMUSCULAR; INTRAVENOUS at 19:33

## 2022-05-24 RX ADMIN — HYDROCODONE BITARTRATE AND ACETAMINOPHEN 1 TABLET: 7.5; 325 TABLET ORAL at 15:08

## 2022-05-24 RX ADMIN — Medication 10 ML: at 08:16

## 2022-05-24 RX ADMIN — DAPTOMYCIN 750 MG: 500 INJECTION, POWDER, LYOPHILIZED, FOR SOLUTION INTRAVENOUS at 20:03

## 2022-05-24 RX ADMIN — INSULIN ASPART 8 UNITS: 100 INJECTION, SOLUTION INTRAVENOUS; SUBCUTANEOUS at 11:02

## 2022-05-24 RX ADMIN — Medication 1 TABLET: at 08:16

## 2022-05-24 RX ADMIN — HEPARIN SODIUM 5000 UNITS: 5000 INJECTION INTRAVENOUS; SUBCUTANEOUS at 17:13

## 2022-05-24 RX ADMIN — PANTOPRAZOLE SODIUM 40 MG: 40 TABLET, DELAYED RELEASE ORAL at 05:15

## 2022-05-24 RX ADMIN — INSULIN DETEMIR 30 UNITS: 100 INJECTION, SOLUTION SUBCUTANEOUS at 08:17

## 2022-05-24 RX ADMIN — Medication 10 ML: at 20:03

## 2022-05-24 RX ADMIN — INSULIN ASPART 3 UNITS: 100 INJECTION, SOLUTION INTRAVENOUS; SUBCUTANEOUS at 08:16

## 2022-05-24 RX ADMIN — INSULIN ASPART 13 UNITS: 100 INJECTION, SOLUTION INTRAVENOUS; SUBCUTANEOUS at 17:13

## 2022-05-24 RX ADMIN — HYDROCODONE BITARTRATE AND ACETAMINOPHEN 1 TABLET: 7.5; 325 TABLET ORAL at 00:32

## 2022-05-24 RX ADMIN — INSULIN ASPART 10 UNITS: 100 INJECTION, SOLUTION INTRAVENOUS; SUBCUTANEOUS at 08:16

## 2022-05-25 ENCOUNTER — APPOINTMENT (OUTPATIENT)
Dept: CT IMAGING | Facility: HOSPITAL | Age: 57
End: 2022-05-25

## 2022-05-25 LAB
ALBUMIN SERPL-MCNC: 1.84 G/DL (ref 3.5–5.2)
ALBUMIN/GLOB SERPL: 0.5 G/DL
ALP SERPL-CCNC: 341 U/L (ref 39–117)
ALT SERPL W P-5'-P-CCNC: 34 U/L (ref 1–41)
ANION GAP SERPL CALCULATED.3IONS-SCNC: 6 MMOL/L (ref 5–15)
AST SERPL-CCNC: 60 U/L (ref 1–40)
BASOPHILS # BLD AUTO: 0.02 10*3/MM3 (ref 0–0.2)
BASOPHILS NFR BLD AUTO: 0.5 % (ref 0–1.5)
BILIRUB SERPL-MCNC: 0.3 MG/DL (ref 0–1.2)
BUN SERPL-MCNC: 18 MG/DL (ref 6–20)
BUN/CREAT SERPL: 24 (ref 7–25)
CALCIUM SPEC-SCNC: 7.7 MG/DL (ref 8.6–10.5)
CHLORIDE SERPL-SCNC: 98 MMOL/L (ref 98–107)
CO2 SERPL-SCNC: 27 MMOL/L (ref 22–29)
CREAT SERPL-MCNC: 0.75 MG/DL (ref 0.76–1.27)
CREAT UR-MCNC: 91 MG/DL
CRP SERPL-MCNC: 5.76 MG/DL (ref 0–0.5)
DEPRECATED RDW RBC AUTO: 44.6 FL (ref 37–54)
EGFRCR SERPLBLD CKD-EPI 2021: 105.3 ML/MIN/1.73
EOSINOPHIL # BLD AUTO: 0.16 10*3/MM3 (ref 0–0.4)
EOSINOPHIL NFR BLD AUTO: 3.7 % (ref 0.3–6.2)
ERYTHROCYTE [DISTWIDTH] IN BLOOD BY AUTOMATED COUNT: 15.7 % (ref 12.3–15.4)
GLOBULIN UR ELPH-MCNC: 3.7 GM/DL
GLUCOSE BLDC GLUCOMTR-MCNC: 171 MG/DL (ref 70–130)
GLUCOSE BLDC GLUCOMTR-MCNC: 201 MG/DL (ref 70–130)
GLUCOSE BLDC GLUCOMTR-MCNC: 261 MG/DL (ref 70–130)
GLUCOSE BLDC GLUCOMTR-MCNC: 267 MG/DL (ref 70–130)
GLUCOSE SERPL-MCNC: 179 MG/DL (ref 65–99)
HCT VFR BLD AUTO: 28.9 % (ref 37.5–51)
HGB BLD-MCNC: 9.1 G/DL (ref 13–17.7)
HIV1+2 AB SER QL: NORMAL
IMM GRANULOCYTES # BLD AUTO: 0.02 10*3/MM3 (ref 0–0.05)
IMM GRANULOCYTES NFR BLD AUTO: 0.5 % (ref 0–0.5)
LYMPHOCYTES # BLD AUTO: 1.21 10*3/MM3 (ref 0.7–3.1)
LYMPHOCYTES NFR BLD AUTO: 27.9 % (ref 19.6–45.3)
MAGNESIUM SERPL-MCNC: 1.9 MG/DL (ref 1.6–2.6)
MCH RBC QN AUTO: 25 PG (ref 26.6–33)
MCHC RBC AUTO-ENTMCNC: 31.5 G/DL (ref 31.5–35.7)
MCV RBC AUTO: 79.4 FL (ref 79–97)
MONOCYTES # BLD AUTO: 0.31 10*3/MM3 (ref 0.1–0.9)
MONOCYTES NFR BLD AUTO: 7.2 % (ref 5–12)
NEUTROPHILS NFR BLD AUTO: 2.61 10*3/MM3 (ref 1.7–7)
NEUTROPHILS NFR BLD AUTO: 60.2 % (ref 42.7–76)
NRBC BLD AUTO-RTO: 0 /100 WBC (ref 0–0.2)
PLATELET # BLD AUTO: 133 10*3/MM3 (ref 140–450)
PMV BLD AUTO: 9.9 FL (ref 6–12)
POTASSIUM SERPL-SCNC: 4 MMOL/L (ref 3.5–5.2)
PROT ?TM UR-MCNC: 26.9 MG/DL
PROT SERPL-MCNC: 5.5 G/DL (ref 6–8.5)
PROT/CREAT UR: 295.6 MG/G CREA (ref 0–200)
RBC # BLD AUTO: 3.64 10*6/MM3 (ref 4.14–5.8)
SODIUM SERPL-SCNC: 131 MMOL/L (ref 136–145)
WBC NRBC COR # BLD: 4.33 10*3/MM3 (ref 3.4–10.8)

## 2022-05-25 PROCEDURE — 81332 SERPINA1 GENE: CPT | Performed by: INTERNAL MEDICINE

## 2022-05-25 PROCEDURE — 86015 ACTIN ANTIBODY EACH: CPT | Performed by: INTERNAL MEDICINE

## 2022-05-25 PROCEDURE — 86381 MITOCHONDRIAL ANTIBODY EACH: CPT | Performed by: INTERNAL MEDICINE

## 2022-05-25 PROCEDURE — 82962 GLUCOSE BLOOD TEST: CPT

## 2022-05-25 PROCEDURE — 83735 ASSAY OF MAGNESIUM: CPT | Performed by: INTERNAL MEDICINE

## 2022-05-25 PROCEDURE — 80053 COMPREHEN METABOLIC PANEL: CPT | Performed by: INTERNAL MEDICINE

## 2022-05-25 PROCEDURE — G0432 EIA HIV-1/HIV-2 SCREEN: HCPCS | Performed by: INTERNAL MEDICINE

## 2022-05-25 PROCEDURE — 63710000001 INSULIN DETEMIR PER 5 UNITS: Performed by: INTERNAL MEDICINE

## 2022-05-25 PROCEDURE — 25010000002 DAPTOMYCIN PER 1 MG: Performed by: INTERNAL MEDICINE

## 2022-05-25 PROCEDURE — 74176 CT ABD & PELVIS W/O CONTRAST: CPT

## 2022-05-25 PROCEDURE — 82390 ASSAY OF CERULOPLASMIN: CPT | Performed by: INTERNAL MEDICINE

## 2022-05-25 PROCEDURE — 74176 CT ABD & PELVIS W/O CONTRAST: CPT | Performed by: RADIOLOGY

## 2022-05-25 PROCEDURE — 25010000002 HEPARIN (PORCINE) PER 1000 UNITS: Performed by: INTERNAL MEDICINE

## 2022-05-25 PROCEDURE — 85025 COMPLETE CBC W/AUTO DIFF WBC: CPT | Performed by: INTERNAL MEDICINE

## 2022-05-25 PROCEDURE — 63710000001 INSULIN ASPART PER 5 UNITS: Performed by: PODIATRIST

## 2022-05-25 PROCEDURE — 99232 SBSQ HOSP IP/OBS MODERATE 35: CPT | Performed by: INTERNAL MEDICINE

## 2022-05-25 PROCEDURE — 86140 C-REACTIVE PROTEIN: CPT | Performed by: INTERNAL MEDICINE

## 2022-05-25 PROCEDURE — 25010000002 FUROSEMIDE PER 20 MG: Performed by: INTERNAL MEDICINE

## 2022-05-25 PROCEDURE — 63710000001 INSULIN ASPART PER 5 UNITS: Performed by: INTERNAL MEDICINE

## 2022-05-25 RX ORDER — FUROSEMIDE 10 MG/ML
20 INJECTION INTRAMUSCULAR; INTRAVENOUS EVERY 12 HOURS
Status: COMPLETED | OUTPATIENT
Start: 2022-05-25 | End: 2022-05-26

## 2022-05-25 RX ORDER — INSULIN ASPART 100 [IU]/ML
15 INJECTION, SOLUTION INTRAVENOUS; SUBCUTANEOUS
Status: DISCONTINUED | OUTPATIENT
Start: 2022-05-25 | End: 2022-05-26

## 2022-05-25 RX ADMIN — INSULIN DETEMIR 35 UNITS: 100 INJECTION, SOLUTION SUBCUTANEOUS at 08:21

## 2022-05-25 RX ADMIN — HEPARIN SODIUM 5000 UNITS: 5000 INJECTION INTRAVENOUS; SUBCUTANEOUS at 01:20

## 2022-05-25 RX ADMIN — Medication 10 ML: at 08:20

## 2022-05-25 RX ADMIN — HEPARIN SODIUM 5000 UNITS: 5000 INJECTION INTRAVENOUS; SUBCUTANEOUS at 17:14

## 2022-05-25 RX ADMIN — Medication 10 ML: at 21:34

## 2022-05-25 RX ADMIN — FUROSEMIDE 20 MG: 10 INJECTION, SOLUTION INTRAMUSCULAR; INTRAVENOUS at 17:14

## 2022-05-25 RX ADMIN — INSULIN ASPART 8 UNITS: 100 INJECTION, SOLUTION INTRAVENOUS; SUBCUTANEOUS at 11:49

## 2022-05-25 RX ADMIN — INSULIN ASPART 13 UNITS: 100 INJECTION, SOLUTION INTRAVENOUS; SUBCUTANEOUS at 11:49

## 2022-05-25 RX ADMIN — INSULIN ASPART 15 UNITS: 100 INJECTION, SOLUTION INTRAVENOUS; SUBCUTANEOUS at 17:13

## 2022-05-25 RX ADMIN — FUROSEMIDE 20 MG: 10 INJECTION, SOLUTION INTRAMUSCULAR; INTRAVENOUS at 06:15

## 2022-05-25 RX ADMIN — INSULIN ASPART 13 UNITS: 100 INJECTION, SOLUTION INTRAVENOUS; SUBCUTANEOUS at 08:20

## 2022-05-25 RX ADMIN — HYDROCODONE BITARTRATE AND ACETAMINOPHEN 1 TABLET: 7.5; 325 TABLET ORAL at 01:20

## 2022-05-25 RX ADMIN — DAPTOMYCIN 750 MG: 500 INJECTION, POWDER, LYOPHILIZED, FOR SOLUTION INTRAVENOUS at 21:34

## 2022-05-25 RX ADMIN — INSULIN ASPART 5 UNITS: 100 INJECTION, SOLUTION INTRAVENOUS; SUBCUTANEOUS at 08:21

## 2022-05-25 RX ADMIN — Medication 1 TABLET: at 08:20

## 2022-05-25 RX ADMIN — ASPIRIN 81 MG: 81 TABLET, COATED ORAL at 08:20

## 2022-05-25 RX ADMIN — INSULIN ASPART 3 UNITS: 100 INJECTION, SOLUTION INTRAVENOUS; SUBCUTANEOUS at 17:13

## 2022-05-25 RX ADMIN — PANTOPRAZOLE SODIUM 40 MG: 40 TABLET, DELAYED RELEASE ORAL at 05:13

## 2022-05-26 LAB
ALBUMIN SERPL-MCNC: 1.75 G/DL (ref 3.5–5.2)
ALBUMIN/GLOB SERPL: 0.5 G/DL
ALP SERPL-CCNC: 343 U/L (ref 39–117)
ALT SERPL W P-5'-P-CCNC: 38 U/L (ref 1–41)
ANION GAP SERPL CALCULATED.3IONS-SCNC: 6.5 MMOL/L (ref 5–15)
AST SERPL-CCNC: 61 U/L (ref 1–40)
BASOPHILS # BLD AUTO: 0.02 10*3/MM3 (ref 0–0.2)
BASOPHILS NFR BLD AUTO: 0.5 % (ref 0–1.5)
BILIRUB SERPL-MCNC: 0.4 MG/DL (ref 0–1.2)
BUN SERPL-MCNC: 19 MG/DL (ref 6–20)
BUN/CREAT SERPL: 26.4 (ref 7–25)
CALCIUM SPEC-SCNC: 7.7 MG/DL (ref 8.6–10.5)
CERULOPLASMIN SERPL-MCNC: 33 MG/DL (ref 16–31)
CHLORIDE SERPL-SCNC: 100 MMOL/L (ref 98–107)
CO2 SERPL-SCNC: 26.5 MMOL/L (ref 22–29)
CREAT SERPL-MCNC: 0.72 MG/DL (ref 0.76–1.27)
CRP SERPL-MCNC: 5.64 MG/DL (ref 0–0.5)
DEPRECATED RDW RBC AUTO: 45.4 FL (ref 37–54)
EGFRCR SERPLBLD CKD-EPI 2021: 106.6 ML/MIN/1.73
EOSINOPHIL # BLD AUTO: 0.11 10*3/MM3 (ref 0–0.4)
EOSINOPHIL NFR BLD AUTO: 2.7 % (ref 0.3–6.2)
ERYTHROCYTE [DISTWIDTH] IN BLOOD BY AUTOMATED COUNT: 16 % (ref 12.3–15.4)
GLOBULIN UR ELPH-MCNC: 3.8 GM/DL
GLUCOSE BLDC GLUCOMTR-MCNC: 139 MG/DL (ref 70–130)
GLUCOSE BLDC GLUCOMTR-MCNC: 181 MG/DL (ref 70–130)
GLUCOSE BLDC GLUCOMTR-MCNC: 228 MG/DL (ref 70–130)
GLUCOSE BLDC GLUCOMTR-MCNC: 327 MG/DL (ref 70–130)
GLUCOSE SERPL-MCNC: 223 MG/DL (ref 65–99)
HCT VFR BLD AUTO: 28.8 % (ref 37.5–51)
HGB BLD-MCNC: 9 G/DL (ref 13–17.7)
IMM GRANULOCYTES # BLD AUTO: 0.01 10*3/MM3 (ref 0–0.05)
IMM GRANULOCYTES NFR BLD AUTO: 0.2 % (ref 0–0.5)
LYMPHOCYTES # BLD AUTO: 1.3 10*3/MM3 (ref 0.7–3.1)
LYMPHOCYTES NFR BLD AUTO: 31.3 % (ref 19.6–45.3)
MAGNESIUM SERPL-MCNC: 1.9 MG/DL (ref 1.6–2.6)
MCH RBC QN AUTO: 24.6 PG (ref 26.6–33)
MCHC RBC AUTO-ENTMCNC: 31.3 G/DL (ref 31.5–35.7)
MCV RBC AUTO: 78.7 FL (ref 79–97)
MONOCYTES # BLD AUTO: 0.34 10*3/MM3 (ref 0.1–0.9)
MONOCYTES NFR BLD AUTO: 8.2 % (ref 5–12)
NEUTROPHILS NFR BLD AUTO: 2.37 10*3/MM3 (ref 1.7–7)
NEUTROPHILS NFR BLD AUTO: 57.1 % (ref 42.7–76)
NRBC BLD AUTO-RTO: 0 /100 WBC (ref 0–0.2)
PLATELET # BLD AUTO: 139 10*3/MM3 (ref 140–450)
PMV BLD AUTO: 9.8 FL (ref 6–12)
POTASSIUM SERPL-SCNC: 4.3 MMOL/L (ref 3.5–5.2)
PROT SERPL-MCNC: 5.5 G/DL (ref 6–8.5)
RBC # BLD AUTO: 3.66 10*6/MM3 (ref 4.14–5.8)
SODIUM SERPL-SCNC: 133 MMOL/L (ref 136–145)
WBC NRBC COR # BLD: 4.15 10*3/MM3 (ref 3.4–10.8)

## 2022-05-26 PROCEDURE — 82962 GLUCOSE BLOOD TEST: CPT

## 2022-05-26 PROCEDURE — 86140 C-REACTIVE PROTEIN: CPT | Performed by: INTERNAL MEDICINE

## 2022-05-26 PROCEDURE — 85025 COMPLETE CBC W/AUTO DIFF WBC: CPT | Performed by: INTERNAL MEDICINE

## 2022-05-26 PROCEDURE — 97530 THERAPEUTIC ACTIVITIES: CPT

## 2022-05-26 PROCEDURE — 63710000001 INSULIN DETEMIR PER 5 UNITS: Performed by: INTERNAL MEDICINE

## 2022-05-26 PROCEDURE — 97110 THERAPEUTIC EXERCISES: CPT

## 2022-05-26 PROCEDURE — 80053 COMPREHEN METABOLIC PANEL: CPT | Performed by: INTERNAL MEDICINE

## 2022-05-26 PROCEDURE — 63710000001 INSULIN ASPART PER 5 UNITS: Performed by: INTERNAL MEDICINE

## 2022-05-26 PROCEDURE — 25010000002 DAPTOMYCIN PER 1 MG: Performed by: INTERNAL MEDICINE

## 2022-05-26 PROCEDURE — 25010000002 HEPARIN (PORCINE) PER 1000 UNITS: Performed by: INTERNAL MEDICINE

## 2022-05-26 PROCEDURE — 63710000001 INSULIN ASPART PER 5 UNITS: Performed by: PODIATRIST

## 2022-05-26 PROCEDURE — 99231 SBSQ HOSP IP/OBS SF/LOW 25: CPT | Performed by: INTERNAL MEDICINE

## 2022-05-26 PROCEDURE — 83735 ASSAY OF MAGNESIUM: CPT | Performed by: INTERNAL MEDICINE

## 2022-05-26 PROCEDURE — 25010000002 FUROSEMIDE PER 20 MG: Performed by: INTERNAL MEDICINE

## 2022-05-26 RX ORDER — FUROSEMIDE 10 MG/ML
20 INJECTION INTRAMUSCULAR; INTRAVENOUS EVERY 12 HOURS
Status: COMPLETED | OUTPATIENT
Start: 2022-05-26 | End: 2022-05-27

## 2022-05-26 RX ORDER — INSULIN ASPART 100 [IU]/ML
18 INJECTION, SOLUTION INTRAVENOUS; SUBCUTANEOUS
Status: DISCONTINUED | OUTPATIENT
Start: 2022-05-26 | End: 2022-05-29

## 2022-05-26 RX ADMIN — Medication 10 ML: at 09:25

## 2022-05-26 RX ADMIN — HEPARIN SODIUM 5000 UNITS: 5000 INJECTION INTRAVENOUS; SUBCUTANEOUS at 03:00

## 2022-05-26 RX ADMIN — DAPTOMYCIN 750 MG: 500 INJECTION, POWDER, LYOPHILIZED, FOR SOLUTION INTRAVENOUS at 21:22

## 2022-05-26 RX ADMIN — ASPIRIN 81 MG: 81 TABLET, COATED ORAL at 08:13

## 2022-05-26 RX ADMIN — INSULIN ASPART 10 UNITS: 100 INJECTION, SOLUTION INTRAVENOUS; SUBCUTANEOUS at 11:12

## 2022-05-26 RX ADMIN — PANTOPRAZOLE SODIUM 40 MG: 40 TABLET, DELAYED RELEASE ORAL at 05:29

## 2022-05-26 RX ADMIN — INSULIN DETEMIR 10 UNITS: 100 INJECTION, SOLUTION SUBCUTANEOUS at 16:57

## 2022-05-26 RX ADMIN — FUROSEMIDE 20 MG: 10 INJECTION, SOLUTION INTRAMUSCULAR; INTRAVENOUS at 05:30

## 2022-05-26 RX ADMIN — FUROSEMIDE 20 MG: 10 INJECTION, SOLUTION INTRAMUSCULAR; INTRAVENOUS at 16:57

## 2022-05-26 RX ADMIN — INSULIN ASPART 5 UNITS: 100 INJECTION, SOLUTION INTRAVENOUS; SUBCUTANEOUS at 08:12

## 2022-05-26 RX ADMIN — HEPARIN SODIUM 5000 UNITS: 5000 INJECTION INTRAVENOUS; SUBCUTANEOUS at 13:43

## 2022-05-26 RX ADMIN — INSULIN DETEMIR 40 UNITS: 100 INJECTION, SOLUTION SUBCUTANEOUS at 08:13

## 2022-05-26 RX ADMIN — HYDROCODONE BITARTRATE AND ACETAMINOPHEN 1 TABLET: 7.5; 325 TABLET ORAL at 05:30

## 2022-05-26 RX ADMIN — Medication 1 TABLET: at 08:13

## 2022-05-26 RX ADMIN — HEPARIN SODIUM 5000 UNITS: 5000 INJECTION INTRAVENOUS; SUBCUTANEOUS at 21:22

## 2022-05-26 RX ADMIN — INSULIN ASPART 15 UNITS: 100 INJECTION, SOLUTION INTRAVENOUS; SUBCUTANEOUS at 11:12

## 2022-05-26 RX ADMIN — INSULIN ASPART 3 UNITS: 100 INJECTION, SOLUTION INTRAVENOUS; SUBCUTANEOUS at 16:56

## 2022-05-26 RX ADMIN — Medication 10 ML: at 09:24

## 2022-05-26 RX ADMIN — INSULIN ASPART 15 UNITS: 100 INJECTION, SOLUTION INTRAVENOUS; SUBCUTANEOUS at 08:13

## 2022-05-26 RX ADMIN — INSULIN ASPART 18 UNITS: 100 INJECTION, SOLUTION INTRAVENOUS; SUBCUTANEOUS at 16:57

## 2022-05-27 LAB
ANION GAP SERPL CALCULATED.3IONS-SCNC: 8.5 MMOL/L (ref 5–15)
BASOPHILS # BLD AUTO: 0.02 10*3/MM3 (ref 0–0.2)
BASOPHILS NFR BLD AUTO: 0.4 % (ref 0–1.5)
BUN SERPL-MCNC: 21 MG/DL (ref 6–20)
BUN/CREAT SERPL: 30.9 (ref 7–25)
CALCIUM SPEC-SCNC: 7.7 MG/DL (ref 8.6–10.5)
CHLORIDE SERPL-SCNC: 101 MMOL/L (ref 98–107)
CO2 SERPL-SCNC: 27.5 MMOL/L (ref 22–29)
CREAT SERPL-MCNC: 0.68 MG/DL (ref 0.76–1.27)
CRP SERPL-MCNC: 5.05 MG/DL (ref 0–0.5)
DEPRECATED RDW RBC AUTO: 45.1 FL (ref 37–54)
EGFRCR SERPLBLD CKD-EPI 2021: 108.4 ML/MIN/1.73
EOSINOPHIL # BLD AUTO: 0.11 10*3/MM3 (ref 0–0.4)
EOSINOPHIL NFR BLD AUTO: 2.3 % (ref 0.3–6.2)
ERYTHROCYTE [DISTWIDTH] IN BLOOD BY AUTOMATED COUNT: 16 % (ref 12.3–15.4)
GLUCOSE BLDC GLUCOMTR-MCNC: 119 MG/DL (ref 70–130)
GLUCOSE BLDC GLUCOMTR-MCNC: 153 MG/DL (ref 70–130)
GLUCOSE BLDC GLUCOMTR-MCNC: 178 MG/DL (ref 70–130)
GLUCOSE BLDC GLUCOMTR-MCNC: 181 MG/DL (ref 70–130)
GLUCOSE BLDC GLUCOMTR-MCNC: 84 MG/DL (ref 70–130)
GLUCOSE SERPL-MCNC: 114 MG/DL (ref 65–99)
HCT VFR BLD AUTO: 28.5 % (ref 37.5–51)
HGB BLD-MCNC: 9.2 G/DL (ref 13–17.7)
IMM GRANULOCYTES # BLD AUTO: 0.02 10*3/MM3 (ref 0–0.05)
IMM GRANULOCYTES NFR BLD AUTO: 0.4 % (ref 0–0.5)
LYMPHOCYTES # BLD AUTO: 1.66 10*3/MM3 (ref 0.7–3.1)
LYMPHOCYTES NFR BLD AUTO: 35.3 % (ref 19.6–45.3)
MAGNESIUM SERPL-MCNC: 1.9 MG/DL (ref 1.6–2.6)
MCH RBC QN AUTO: 25.3 PG (ref 26.6–33)
MCHC RBC AUTO-ENTMCNC: 32.3 G/DL (ref 31.5–35.7)
MCV RBC AUTO: 78.3 FL (ref 79–97)
MITOCHONDRIA M2 IGG SER-ACNC: <20 UNITS (ref 0–20)
MONOCYTES # BLD AUTO: 0.4 10*3/MM3 (ref 0.1–0.9)
MONOCYTES NFR BLD AUTO: 8.5 % (ref 5–12)
NEUTROPHILS NFR BLD AUTO: 2.49 10*3/MM3 (ref 1.7–7)
NEUTROPHILS NFR BLD AUTO: 53.1 % (ref 42.7–76)
NRBC BLD AUTO-RTO: 0 /100 WBC (ref 0–0.2)
PLATELET # BLD AUTO: 136 10*3/MM3 (ref 140–450)
PMV BLD AUTO: 9.8 FL (ref 6–12)
POTASSIUM SERPL-SCNC: 4 MMOL/L (ref 3.5–5.2)
RBC # BLD AUTO: 3.64 10*6/MM3 (ref 4.14–5.8)
SMA IGG SER-ACNC: 8 UNITS (ref 0–19)
SODIUM SERPL-SCNC: 137 MMOL/L (ref 136–145)
WBC NRBC COR # BLD: 4.7 10*3/MM3 (ref 3.4–10.8)

## 2022-05-27 PROCEDURE — 25010000002 FUROSEMIDE PER 20 MG: Performed by: INTERNAL MEDICINE

## 2022-05-27 PROCEDURE — 99232 SBSQ HOSP IP/OBS MODERATE 35: CPT | Performed by: INTERNAL MEDICINE

## 2022-05-27 PROCEDURE — 63710000001 INSULIN DETEMIR PER 5 UNITS: Performed by: INTERNAL MEDICINE

## 2022-05-27 PROCEDURE — 86140 C-REACTIVE PROTEIN: CPT | Performed by: INTERNAL MEDICINE

## 2022-05-27 PROCEDURE — 85025 COMPLETE CBC W/AUTO DIFF WBC: CPT | Performed by: INTERNAL MEDICINE

## 2022-05-27 PROCEDURE — 80048 BASIC METABOLIC PNL TOTAL CA: CPT | Performed by: INTERNAL MEDICINE

## 2022-05-27 PROCEDURE — 82962 GLUCOSE BLOOD TEST: CPT

## 2022-05-27 PROCEDURE — 63710000001 INSULIN ASPART PER 5 UNITS: Performed by: PODIATRIST

## 2022-05-27 PROCEDURE — 25010000002 HEPARIN (PORCINE) PER 1000 UNITS: Performed by: INTERNAL MEDICINE

## 2022-05-27 PROCEDURE — 83735 ASSAY OF MAGNESIUM: CPT | Performed by: INTERNAL MEDICINE

## 2022-05-27 PROCEDURE — 63710000001 INSULIN ASPART PER 5 UNITS: Performed by: INTERNAL MEDICINE

## 2022-05-27 PROCEDURE — 25010000002 DAPTOMYCIN PER 1 MG: Performed by: INTERNAL MEDICINE

## 2022-05-27 PROCEDURE — 94761 N-INVAS EAR/PLS OXIMETRY MLT: CPT

## 2022-05-27 PROCEDURE — 94799 UNLISTED PULMONARY SVC/PX: CPT

## 2022-05-27 RX ORDER — FUROSEMIDE 10 MG/ML
20 INJECTION INTRAMUSCULAR; INTRAVENOUS ONCE
Status: COMPLETED | OUTPATIENT
Start: 2022-05-27 | End: 2022-05-27

## 2022-05-27 RX ADMIN — INSULIN ASPART 18 UNITS: 100 INJECTION, SOLUTION INTRAVENOUS; SUBCUTANEOUS at 11:41

## 2022-05-27 RX ADMIN — INSULIN ASPART 3 UNITS: 100 INJECTION, SOLUTION INTRAVENOUS; SUBCUTANEOUS at 11:41

## 2022-05-27 RX ADMIN — FUROSEMIDE 20 MG: 10 INJECTION, SOLUTION INTRAMUSCULAR; INTRAVENOUS at 14:52

## 2022-05-27 RX ADMIN — HYDROCODONE BITARTRATE AND ACETAMINOPHEN 1 TABLET: 7.5; 325 TABLET ORAL at 23:50

## 2022-05-27 RX ADMIN — FUROSEMIDE 20 MG: 10 INJECTION, SOLUTION INTRAMUSCULAR; INTRAVENOUS at 08:09

## 2022-05-27 RX ADMIN — DAPTOMYCIN 750 MG: 500 INJECTION, POWDER, LYOPHILIZED, FOR SOLUTION INTRAVENOUS at 20:32

## 2022-05-27 RX ADMIN — INSULIN ASPART 18 UNITS: 100 INJECTION, SOLUTION INTRAVENOUS; SUBCUTANEOUS at 08:10

## 2022-05-27 RX ADMIN — HYDROCODONE BITARTRATE AND ACETAMINOPHEN 1 TABLET: 7.5; 325 TABLET ORAL at 02:02

## 2022-05-27 RX ADMIN — PANTOPRAZOLE SODIUM 40 MG: 40 TABLET, DELAYED RELEASE ORAL at 05:50

## 2022-05-27 RX ADMIN — HEPARIN SODIUM 5000 UNITS: 5000 INJECTION INTRAVENOUS; SUBCUTANEOUS at 13:23

## 2022-05-27 RX ADMIN — HYDROCODONE BITARTRATE AND ACETAMINOPHEN 1 TABLET: 7.5; 325 TABLET ORAL at 13:31

## 2022-05-27 RX ADMIN — Medication 10 ML: at 08:10

## 2022-05-27 RX ADMIN — HEPARIN SODIUM 5000 UNITS: 5000 INJECTION INTRAVENOUS; SUBCUTANEOUS at 06:00

## 2022-05-27 RX ADMIN — ASPIRIN 81 MG: 81 TABLET, COATED ORAL at 08:09

## 2022-05-27 RX ADMIN — INSULIN DETEMIR 50 UNITS: 100 INJECTION, SOLUTION SUBCUTANEOUS at 08:11

## 2022-05-27 RX ADMIN — Medication 10 ML: at 08:11

## 2022-05-27 RX ADMIN — Medication 1 TABLET: at 08:09

## 2022-05-28 LAB
ANION GAP SERPL CALCULATED.3IONS-SCNC: 7.3 MMOL/L (ref 5–15)
BASOPHILS # BLD AUTO: 0.01 10*3/MM3 (ref 0–0.2)
BASOPHILS NFR BLD AUTO: 0.3 % (ref 0–1.5)
BUN SERPL-MCNC: 19 MG/DL (ref 6–20)
BUN/CREAT SERPL: 30.6 (ref 7–25)
CALCIUM SPEC-SCNC: 7.8 MG/DL (ref 8.6–10.5)
CHLORIDE SERPL-SCNC: 101 MMOL/L (ref 98–107)
CO2 SERPL-SCNC: 25.7 MMOL/L (ref 22–29)
CREAT SERPL-MCNC: 0.62 MG/DL (ref 0.76–1.27)
DEPRECATED RDW RBC AUTO: 46.4 FL (ref 37–54)
EGFRCR SERPLBLD CKD-EPI 2021: 111.5 ML/MIN/1.73
EOSINOPHIL # BLD AUTO: 0.13 10*3/MM3 (ref 0–0.4)
EOSINOPHIL NFR BLD AUTO: 3.6 % (ref 0.3–6.2)
ERYTHROCYTE [DISTWIDTH] IN BLOOD BY AUTOMATED COUNT: 16.3 % (ref 12.3–15.4)
GLUCOSE BLDC GLUCOMTR-MCNC: 161 MG/DL (ref 70–130)
GLUCOSE BLDC GLUCOMTR-MCNC: 176 MG/DL (ref 70–130)
GLUCOSE BLDC GLUCOMTR-MCNC: 179 MG/DL (ref 70–130)
GLUCOSE BLDC GLUCOMTR-MCNC: 246 MG/DL (ref 70–130)
GLUCOSE SERPL-MCNC: 177 MG/DL (ref 65–99)
HCT VFR BLD AUTO: 29.1 % (ref 37.5–51)
HGB BLD-MCNC: 9.1 G/DL (ref 13–17.7)
IMM GRANULOCYTES # BLD AUTO: 0.01 10*3/MM3 (ref 0–0.05)
IMM GRANULOCYTES NFR BLD AUTO: 0.3 % (ref 0–0.5)
LYMPHOCYTES # BLD AUTO: 1.19 10*3/MM3 (ref 0.7–3.1)
LYMPHOCYTES NFR BLD AUTO: 33.1 % (ref 19.6–45.3)
MAGNESIUM SERPL-MCNC: 1.9 MG/DL (ref 1.6–2.6)
MCH RBC QN AUTO: 24.7 PG (ref 26.6–33)
MCHC RBC AUTO-ENTMCNC: 31.3 G/DL (ref 31.5–35.7)
MCV RBC AUTO: 79.1 FL (ref 79–97)
MONOCYTES # BLD AUTO: 0.32 10*3/MM3 (ref 0.1–0.9)
MONOCYTES NFR BLD AUTO: 8.9 % (ref 5–12)
NEUTROPHILS NFR BLD AUTO: 1.93 10*3/MM3 (ref 1.7–7)
NEUTROPHILS NFR BLD AUTO: 53.8 % (ref 42.7–76)
NRBC BLD AUTO-RTO: 0 /100 WBC (ref 0–0.2)
PLATELET # BLD AUTO: 127 10*3/MM3 (ref 140–450)
PMV BLD AUTO: 10 FL (ref 6–12)
POTASSIUM SERPL-SCNC: 4 MMOL/L (ref 3.5–5.2)
RBC # BLD AUTO: 3.68 10*6/MM3 (ref 4.14–5.8)
SODIUM SERPL-SCNC: 134 MMOL/L (ref 136–145)
WBC NRBC COR # BLD: 3.59 10*3/MM3 (ref 3.4–10.8)

## 2022-05-28 PROCEDURE — 25010000002 HEPARIN (PORCINE) PER 1000 UNITS: Performed by: INTERNAL MEDICINE

## 2022-05-28 PROCEDURE — 25010000002 VANCOMYCIN 5 G RECONSTITUTED SOLUTION: Performed by: INTERNAL MEDICINE

## 2022-05-28 PROCEDURE — 63710000001 INSULIN ASPART PER 5 UNITS: Performed by: INTERNAL MEDICINE

## 2022-05-28 PROCEDURE — 82962 GLUCOSE BLOOD TEST: CPT

## 2022-05-28 PROCEDURE — 85025 COMPLETE CBC W/AUTO DIFF WBC: CPT | Performed by: INTERNAL MEDICINE

## 2022-05-28 PROCEDURE — 99231 SBSQ HOSP IP/OBS SF/LOW 25: CPT | Performed by: FAMILY MEDICINE

## 2022-05-28 PROCEDURE — 83735 ASSAY OF MAGNESIUM: CPT | Performed by: INTERNAL MEDICINE

## 2022-05-28 PROCEDURE — 63710000001 INSULIN ASPART PER 5 UNITS: Performed by: PODIATRIST

## 2022-05-28 PROCEDURE — 80048 BASIC METABOLIC PNL TOTAL CA: CPT | Performed by: INTERNAL MEDICINE

## 2022-05-28 PROCEDURE — 63710000001 INSULIN DETEMIR PER 5 UNITS: Performed by: INTERNAL MEDICINE

## 2022-05-28 RX ADMIN — INSULIN ASPART 18 UNITS: 100 INJECTION, SOLUTION INTRAVENOUS; SUBCUTANEOUS at 11:16

## 2022-05-28 RX ADMIN — VANCOMYCIN HYDROCHLORIDE 1750 MG: 5 INJECTION, POWDER, LYOPHILIZED, FOR SOLUTION INTRAVENOUS at 20:29

## 2022-05-28 RX ADMIN — Medication 10 ML: at 08:37

## 2022-05-28 RX ADMIN — PANTOPRAZOLE SODIUM 40 MG: 40 TABLET, DELAYED RELEASE ORAL at 05:46

## 2022-05-28 RX ADMIN — INSULIN DETEMIR 50 UNITS: 100 INJECTION, SOLUTION SUBCUTANEOUS at 08:38

## 2022-05-28 RX ADMIN — INSULIN ASPART 5 UNITS: 100 INJECTION, SOLUTION INTRAVENOUS; SUBCUTANEOUS at 11:16

## 2022-05-28 RX ADMIN — ASPIRIN 81 MG: 81 TABLET, COATED ORAL at 08:37

## 2022-05-28 RX ADMIN — INSULIN ASPART 18 UNITS: 100 INJECTION, SOLUTION INTRAVENOUS; SUBCUTANEOUS at 17:33

## 2022-05-28 RX ADMIN — INSULIN ASPART 18 UNITS: 100 INJECTION, SOLUTION INTRAVENOUS; SUBCUTANEOUS at 08:37

## 2022-05-28 RX ADMIN — HEPARIN SODIUM 5000 UNITS: 5000 INJECTION INTRAVENOUS; SUBCUTANEOUS at 06:00

## 2022-05-28 RX ADMIN — Medication 1 TABLET: at 08:37

## 2022-05-28 RX ADMIN — INSULIN ASPART 3 UNITS: 100 INJECTION, SOLUTION INTRAVENOUS; SUBCUTANEOUS at 17:33

## 2022-05-28 RX ADMIN — HEPARIN SODIUM 5000 UNITS: 5000 INJECTION INTRAVENOUS; SUBCUTANEOUS at 15:00

## 2022-05-28 RX ADMIN — INSULIN ASPART 3 UNITS: 100 INJECTION, SOLUTION INTRAVENOUS; SUBCUTANEOUS at 08:37

## 2022-05-28 RX ADMIN — HYDROCODONE BITARTRATE AND ACETAMINOPHEN 1 TABLET: 7.5; 325 TABLET ORAL at 15:01

## 2022-05-29 LAB
ANION GAP SERPL CALCULATED.3IONS-SCNC: 7 MMOL/L (ref 5–15)
BUN SERPL-MCNC: 16 MG/DL (ref 6–20)
BUN/CREAT SERPL: 25 (ref 7–25)
CALCIUM SPEC-SCNC: 7.9 MG/DL (ref 8.6–10.5)
CHLORIDE SERPL-SCNC: 103 MMOL/L (ref 98–107)
CO2 SERPL-SCNC: 27 MMOL/L (ref 22–29)
CREAT SERPL-MCNC: 0.64 MG/DL (ref 0.76–1.27)
EGFRCR SERPLBLD CKD-EPI 2021: 110.4 ML/MIN/1.73
GLUCOSE BLDC GLUCOMTR-MCNC: 108 MG/DL (ref 70–130)
GLUCOSE BLDC GLUCOMTR-MCNC: 207 MG/DL (ref 70–130)
GLUCOSE BLDC GLUCOMTR-MCNC: 209 MG/DL (ref 70–130)
GLUCOSE BLDC GLUCOMTR-MCNC: 239 MG/DL (ref 70–130)
GLUCOSE SERPL-MCNC: 116 MG/DL (ref 65–99)
MAGNESIUM SERPL-MCNC: 2 MG/DL (ref 1.6–2.6)
POTASSIUM SERPL-SCNC: 4 MMOL/L (ref 3.5–5.2)
SODIUM SERPL-SCNC: 137 MMOL/L (ref 136–145)

## 2022-05-29 PROCEDURE — 99232 SBSQ HOSP IP/OBS MODERATE 35: CPT | Performed by: INTERNAL MEDICINE

## 2022-05-29 PROCEDURE — 25010000002 VANCOMYCIN 5 G RECONSTITUTED SOLUTION: Performed by: INTERNAL MEDICINE

## 2022-05-29 PROCEDURE — 83735 ASSAY OF MAGNESIUM: CPT | Performed by: INTERNAL MEDICINE

## 2022-05-29 PROCEDURE — 63710000001 INSULIN DETEMIR PER 5 UNITS: Performed by: INTERNAL MEDICINE

## 2022-05-29 PROCEDURE — 82962 GLUCOSE BLOOD TEST: CPT

## 2022-05-29 PROCEDURE — 80048 BASIC METABOLIC PNL TOTAL CA: CPT | Performed by: INTERNAL MEDICINE

## 2022-05-29 PROCEDURE — 63710000001 INSULIN ASPART PER 5 UNITS: Performed by: INTERNAL MEDICINE

## 2022-05-29 PROCEDURE — 25010000002 HEPARIN (PORCINE) PER 1000 UNITS: Performed by: INTERNAL MEDICINE

## 2022-05-29 RX ORDER — DEXTROSE MONOHYDRATE 25 G/50ML
25 INJECTION, SOLUTION INTRAVENOUS
Status: DISCONTINUED | OUTPATIENT
Start: 2022-05-29 | End: 2022-06-02 | Stop reason: HOSPADM

## 2022-05-29 RX ORDER — INSULIN ASPART 100 [IU]/ML
0-7 INJECTION, SOLUTION INTRAVENOUS; SUBCUTANEOUS
Status: DISCONTINUED | OUTPATIENT
Start: 2022-05-29 | End: 2022-06-02 | Stop reason: HOSPADM

## 2022-05-29 RX ORDER — NICOTINE POLACRILEX 4 MG
15 LOZENGE BUCCAL
Status: DISCONTINUED | OUTPATIENT
Start: 2022-05-29 | End: 2022-06-02 | Stop reason: HOSPADM

## 2022-05-29 RX ORDER — INSULIN ASPART 100 [IU]/ML
10 INJECTION, SOLUTION INTRAVENOUS; SUBCUTANEOUS
Status: DISCONTINUED | OUTPATIENT
Start: 2022-05-29 | End: 2022-05-31

## 2022-05-29 RX ADMIN — HEPARIN SODIUM 5000 UNITS: 5000 INJECTION INTRAVENOUS; SUBCUTANEOUS at 17:31

## 2022-05-29 RX ADMIN — HYDROCODONE BITARTRATE AND ACETAMINOPHEN 1 TABLET: 7.5; 325 TABLET ORAL at 01:12

## 2022-05-29 RX ADMIN — VANCOMYCIN HYDROCHLORIDE 1750 MG: 5 INJECTION, POWDER, LYOPHILIZED, FOR SOLUTION INTRAVENOUS at 20:36

## 2022-05-29 RX ADMIN — Medication 10 ML: at 08:52

## 2022-05-29 RX ADMIN — INSULIN ASPART 3 UNITS: 100 INJECTION, SOLUTION INTRAVENOUS; SUBCUTANEOUS at 17:30

## 2022-05-29 RX ADMIN — HYDROCODONE BITARTRATE AND ACETAMINOPHEN 1 TABLET: 7.5; 325 TABLET ORAL at 13:11

## 2022-05-29 RX ADMIN — HEPARIN SODIUM 5000 UNITS: 5000 INJECTION INTRAVENOUS; SUBCUTANEOUS at 06:11

## 2022-05-29 RX ADMIN — Medication 10 ML: at 08:53

## 2022-05-29 RX ADMIN — PANTOPRAZOLE SODIUM 40 MG: 40 TABLET, DELAYED RELEASE ORAL at 06:11

## 2022-05-29 RX ADMIN — INSULIN ASPART 10 UNITS: 100 INJECTION, SOLUTION INTRAVENOUS; SUBCUTANEOUS at 12:24

## 2022-05-29 RX ADMIN — VANCOMYCIN HYDROCHLORIDE 1750 MG: 5 INJECTION, POWDER, LYOPHILIZED, FOR SOLUTION INTRAVENOUS at 08:55

## 2022-05-29 RX ADMIN — Medication 1 TABLET: at 08:52

## 2022-05-29 RX ADMIN — Medication 10 ML: at 20:36

## 2022-05-29 RX ADMIN — INSULIN ASPART 10 UNITS: 100 INJECTION, SOLUTION INTRAVENOUS; SUBCUTANEOUS at 17:29

## 2022-05-29 RX ADMIN — ASPIRIN 81 MG: 81 TABLET, COATED ORAL at 08:52

## 2022-05-29 RX ADMIN — INSULIN ASPART 3 UNITS: 100 INJECTION, SOLUTION INTRAVENOUS; SUBCUTANEOUS at 12:24

## 2022-05-29 RX ADMIN — INSULIN DETEMIR 50 UNITS: 100 INJECTION, SOLUTION SUBCUTANEOUS at 08:49

## 2022-05-30 LAB
ALBUMIN SERPL-MCNC: 1.92 G/DL (ref 3.5–5.2)
ALBUMIN/GLOB SERPL: 0.5 G/DL
ALP SERPL-CCNC: 337 U/L (ref 39–117)
ALT SERPL W P-5'-P-CCNC: 43 U/L (ref 1–41)
ANION GAP SERPL CALCULATED.3IONS-SCNC: 8.3 MMOL/L (ref 5–15)
ANISOCYTOSIS BLD QL: NORMAL
AST SERPL-CCNC: 67 U/L (ref 1–40)
BASOPHILS # BLD AUTO: 0.01 10*3/MM3 (ref 0–0.2)
BASOPHILS NFR BLD AUTO: 0.3 % (ref 0–1.5)
BILIRUB SERPL-MCNC: 0.3 MG/DL (ref 0–1.2)
BUN SERPL-MCNC: 15 MG/DL (ref 6–20)
BUN/CREAT SERPL: 23.1 (ref 7–25)
CALCIUM SPEC-SCNC: 7.8 MG/DL (ref 8.6–10.5)
CHLORIDE SERPL-SCNC: 103 MMOL/L (ref 98–107)
CK SERPL-CCNC: 36 U/L (ref 20–200)
CO2 SERPL-SCNC: 24.7 MMOL/L (ref 22–29)
CREAT SERPL-MCNC: 0.65 MG/DL (ref 0.76–1.27)
DEPRECATED RDW RBC AUTO: 46.2 FL (ref 37–54)
EGFRCR SERPLBLD CKD-EPI 2021: 109.9 ML/MIN/1.73
EOSINOPHIL # BLD AUTO: 0.1 10*3/MM3 (ref 0–0.4)
EOSINOPHIL NFR BLD AUTO: 2.8 % (ref 0.3–6.2)
ERYTHROCYTE [DISTWIDTH] IN BLOOD BY AUTOMATED COUNT: 16.4 % (ref 12.3–15.4)
GLOBULIN UR ELPH-MCNC: 3.7 GM/DL
GLUCOSE BLDC GLUCOMTR-MCNC: 191 MG/DL (ref 70–130)
GLUCOSE BLDC GLUCOMTR-MCNC: 198 MG/DL (ref 70–130)
GLUCOSE BLDC GLUCOMTR-MCNC: 252 MG/DL (ref 70–130)
GLUCOSE SERPL-MCNC: 197 MG/DL (ref 65–99)
HCT VFR BLD AUTO: 28.1 % (ref 37.5–51)
HGB BLD-MCNC: 9.1 G/DL (ref 13–17.7)
HYPOCHROMIA BLD QL: NORMAL
IMM GRANULOCYTES # BLD AUTO: 0.01 10*3/MM3 (ref 0–0.05)
IMM GRANULOCYTES NFR BLD AUTO: 0.3 % (ref 0–0.5)
INR PPP: 1.04 (ref 0.9–1.1)
LYMPHOCYTES # BLD AUTO: 1.32 10*3/MM3 (ref 0.7–3.1)
LYMPHOCYTES NFR BLD AUTO: 36.4 % (ref 19.6–45.3)
MCH RBC QN AUTO: 25.4 PG (ref 26.6–33)
MCHC RBC AUTO-ENTMCNC: 32.4 G/DL (ref 31.5–35.7)
MCV RBC AUTO: 78.5 FL (ref 79–97)
MICROCYTES BLD QL: NORMAL
MONOCYTES # BLD AUTO: 0.4 10*3/MM3 (ref 0.1–0.9)
MONOCYTES NFR BLD AUTO: 11 % (ref 5–12)
NEUTROPHILS NFR BLD AUTO: 1.79 10*3/MM3 (ref 1.7–7)
NEUTROPHILS NFR BLD AUTO: 49.2 % (ref 42.7–76)
NRBC BLD AUTO-RTO: 0 /100 WBC (ref 0–0.2)
PLATELET # BLD AUTO: 120 10*3/MM3 (ref 140–450)
PMV BLD AUTO: 9.8 FL (ref 6–12)
POTASSIUM SERPL-SCNC: 4.1 MMOL/L (ref 3.5–5.2)
PROT SERPL-MCNC: 5.6 G/DL (ref 6–8.5)
PROTHROMBIN TIME: 13.9 SECONDS (ref 12.1–14.7)
RBC # BLD AUTO: 3.58 10*6/MM3 (ref 4.14–5.8)
SMALL PLATELETS BLD QL SMEAR: NORMAL
SODIUM SERPL-SCNC: 136 MMOL/L (ref 136–145)
T4 FREE SERPL-MCNC: 0.87 NG/DL (ref 0.93–1.7)
TSH SERPL DL<=0.05 MIU/L-ACNC: 4.7 UIU/ML (ref 0.27–4.2)
VANCOMYCIN TROUGH SERPL-MCNC: 13.6 MCG/ML (ref 5–20)
WBC NRBC COR # BLD: 3.63 10*3/MM3 (ref 3.4–10.8)

## 2022-05-30 PROCEDURE — 85610 PROTHROMBIN TIME: CPT | Performed by: INTERNAL MEDICINE

## 2022-05-30 PROCEDURE — 85025 COMPLETE CBC W/AUTO DIFF WBC: CPT | Performed by: INTERNAL MEDICINE

## 2022-05-30 PROCEDURE — 63710000001 INSULIN ASPART PER 5 UNITS: Performed by: INTERNAL MEDICINE

## 2022-05-30 PROCEDURE — 25010000002 ENOXAPARIN PER 10 MG: Performed by: STUDENT IN AN ORGANIZED HEALTH CARE EDUCATION/TRAINING PROGRAM

## 2022-05-30 PROCEDURE — 63710000001 INSULIN DETEMIR PER 5 UNITS: Performed by: STUDENT IN AN ORGANIZED HEALTH CARE EDUCATION/TRAINING PROGRAM

## 2022-05-30 PROCEDURE — 97535 SELF CARE MNGMENT TRAINING: CPT

## 2022-05-30 PROCEDURE — 63710000001 INSULIN ASPART PER 5 UNITS: Performed by: STUDENT IN AN ORGANIZED HEALTH CARE EDUCATION/TRAINING PROGRAM

## 2022-05-30 PROCEDURE — 84443 ASSAY THYROID STIM HORMONE: CPT | Performed by: INTERNAL MEDICINE

## 2022-05-30 PROCEDURE — 85007 BL SMEAR W/DIFF WBC COUNT: CPT | Performed by: INTERNAL MEDICINE

## 2022-05-30 PROCEDURE — 25010000002 VANCOMYCIN 5 G RECONSTITUTED SOLUTION: Performed by: INTERNAL MEDICINE

## 2022-05-30 PROCEDURE — 84439 ASSAY OF FREE THYROXINE: CPT | Performed by: INTERNAL MEDICINE

## 2022-05-30 PROCEDURE — 99232 SBSQ HOSP IP/OBS MODERATE 35: CPT | Performed by: STUDENT IN AN ORGANIZED HEALTH CARE EDUCATION/TRAINING PROGRAM

## 2022-05-30 PROCEDURE — 97530 THERAPEUTIC ACTIVITIES: CPT

## 2022-05-30 PROCEDURE — 80202 ASSAY OF VANCOMYCIN: CPT | Performed by: INTERNAL MEDICINE

## 2022-05-30 PROCEDURE — 97116 GAIT TRAINING THERAPY: CPT

## 2022-05-30 PROCEDURE — 25010000002 HEPARIN (PORCINE) PER 1000 UNITS: Performed by: INTERNAL MEDICINE

## 2022-05-30 PROCEDURE — 80053 COMPREHEN METABOLIC PANEL: CPT | Performed by: INTERNAL MEDICINE

## 2022-05-30 PROCEDURE — 82550 ASSAY OF CK (CPK): CPT | Performed by: INTERNAL MEDICINE

## 2022-05-30 PROCEDURE — 82962 GLUCOSE BLOOD TEST: CPT

## 2022-05-30 PROCEDURE — 25010000002 VANCOMYCIN 5 G RECONSTITUTED SOLUTION: Performed by: STUDENT IN AN ORGANIZED HEALTH CARE EDUCATION/TRAINING PROGRAM

## 2022-05-30 RX ORDER — HYDROCODONE BITARTRATE AND ACETAMINOPHEN 7.5; 325 MG/1; MG/1
1 TABLET ORAL EVERY 6 HOURS PRN
Status: DISCONTINUED | OUTPATIENT
Start: 2022-05-30 | End: 2022-06-02 | Stop reason: HOSPADM

## 2022-05-30 RX ORDER — AMOXICILLIN 250 MG
2 CAPSULE ORAL 2 TIMES DAILY
Status: DISCONTINUED | OUTPATIENT
Start: 2022-05-30 | End: 2022-06-02 | Stop reason: HOSPADM

## 2022-05-30 RX ORDER — POLYETHYLENE GLYCOL 3350 17 G/17G
17 POWDER, FOR SOLUTION ORAL DAILY PRN
Status: DISCONTINUED | OUTPATIENT
Start: 2022-05-30 | End: 2022-06-02 | Stop reason: HOSPADM

## 2022-05-30 RX ORDER — NADOLOL 40 MG/1
20 TABLET ORAL
Status: DISCONTINUED | OUTPATIENT
Start: 2022-05-30 | End: 2022-06-02 | Stop reason: HOSPADM

## 2022-05-30 RX ORDER — BISACODYL 10 MG
10 SUPPOSITORY, RECTAL RECTAL DAILY PRN
Status: DISCONTINUED | OUTPATIENT
Start: 2022-05-30 | End: 2022-06-02 | Stop reason: HOSPADM

## 2022-05-30 RX ORDER — FUROSEMIDE 40 MG/1
40 TABLET ORAL DAILY
Status: DISCONTINUED | OUTPATIENT
Start: 2022-05-30 | End: 2022-06-02 | Stop reason: HOSPADM

## 2022-05-30 RX ORDER — BISACODYL 5 MG/1
5 TABLET, DELAYED RELEASE ORAL DAILY PRN
Status: DISCONTINUED | OUTPATIENT
Start: 2022-05-30 | End: 2022-06-02 | Stop reason: HOSPADM

## 2022-05-30 RX ORDER — ENOXAPARIN SODIUM 100 MG/ML
40 INJECTION SUBCUTANEOUS NIGHTLY
Status: DISCONTINUED | OUTPATIENT
Start: 2022-05-30 | End: 2022-06-02 | Stop reason: HOSPADM

## 2022-05-30 RX ADMIN — INSULIN ASPART 2 UNITS: 100 INJECTION, SOLUTION INTRAVENOUS; SUBCUTANEOUS at 08:04

## 2022-05-30 RX ADMIN — INSULIN ASPART 10 UNITS: 100 INJECTION, SOLUTION INTRAVENOUS; SUBCUTANEOUS at 17:12

## 2022-05-30 RX ADMIN — VANCOMYCIN HYDROCHLORIDE 1750 MG: 5 INJECTION, POWDER, LYOPHILIZED, FOR SOLUTION INTRAVENOUS at 08:05

## 2022-05-30 RX ADMIN — HYDROCODONE BITARTRATE AND ACETAMINOPHEN 1 TABLET: 7.5; 325 TABLET ORAL at 10:26

## 2022-05-30 RX ADMIN — ENOXAPARIN SODIUM 40 MG: 40 INJECTION SUBCUTANEOUS at 19:31

## 2022-05-30 RX ADMIN — DOCUSATE SODIUM 50 MG AND SENNOSIDES 8.6 MG 2 TABLET: 8.6; 5 TABLET, FILM COATED ORAL at 15:28

## 2022-05-30 RX ADMIN — Medication 10 ML: at 19:32

## 2022-05-30 RX ADMIN — INSULIN ASPART 10 UNITS: 100 INJECTION, SOLUTION INTRAVENOUS; SUBCUTANEOUS at 12:00

## 2022-05-30 RX ADMIN — NADOLOL 20 MG: 40 TABLET ORAL at 15:28

## 2022-05-30 RX ADMIN — HEPARIN SODIUM 5000 UNITS: 5000 INJECTION INTRAVENOUS; SUBCUTANEOUS at 05:14

## 2022-05-30 RX ADMIN — INSULIN ASPART 4 UNITS: 100 INJECTION, SOLUTION INTRAVENOUS; SUBCUTANEOUS at 11:59

## 2022-05-30 RX ADMIN — INSULIN ASPART 2 UNITS: 100 INJECTION, SOLUTION INTRAVENOUS; SUBCUTANEOUS at 17:12

## 2022-05-30 RX ADMIN — INSULIN ASPART 10 UNITS: 100 INJECTION, SOLUTION INTRAVENOUS; SUBCUTANEOUS at 08:05

## 2022-05-30 RX ADMIN — INSULIN DETEMIR 50 UNITS: 100 INJECTION, SOLUTION SUBCUTANEOUS at 08:06

## 2022-05-30 RX ADMIN — FUROSEMIDE 40 MG: 40 TABLET ORAL at 10:26

## 2022-05-30 RX ADMIN — DOCUSATE SODIUM 50 MG AND SENNOSIDES 8.6 MG 2 TABLET: 8.6; 5 TABLET, FILM COATED ORAL at 19:31

## 2022-05-30 RX ADMIN — Medication 1 TABLET: at 08:05

## 2022-05-30 RX ADMIN — VANCOMYCIN HYDROCHLORIDE 1750 MG: 5 INJECTION, POWDER, LYOPHILIZED, FOR SOLUTION INTRAVENOUS at 19:32

## 2022-05-30 RX ADMIN — PANTOPRAZOLE SODIUM 40 MG: 40 TABLET, DELAYED RELEASE ORAL at 05:15

## 2022-05-30 RX ADMIN — ASPIRIN 81 MG: 81 TABLET, COATED ORAL at 08:05

## 2022-05-30 RX ADMIN — Medication 10 ML: at 08:05

## 2022-05-31 LAB
ANION GAP SERPL CALCULATED.3IONS-SCNC: 6.3 MMOL/L (ref 5–15)
BUN SERPL-MCNC: 20 MG/DL (ref 6–20)
BUN/CREAT SERPL: 25.6 (ref 7–25)
CALCIUM SPEC-SCNC: 7.7 MG/DL (ref 8.6–10.5)
CHLORIDE SERPL-SCNC: 103 MMOL/L (ref 98–107)
CO2 SERPL-SCNC: 23.7 MMOL/L (ref 22–29)
CREAT SERPL-MCNC: 0.78 MG/DL (ref 0.76–1.27)
EGFRCR SERPLBLD CKD-EPI 2021: 104 ML/MIN/1.73
GLUCOSE BLDC GLUCOMTR-MCNC: 175 MG/DL (ref 70–130)
GLUCOSE BLDC GLUCOMTR-MCNC: 242 MG/DL (ref 70–130)
GLUCOSE BLDC GLUCOMTR-MCNC: 254 MG/DL (ref 70–130)
GLUCOSE SERPL-MCNC: 232 MG/DL (ref 65–99)
MAGNESIUM SERPL-MCNC: 1.9 MG/DL (ref 1.6–2.6)
POTASSIUM SERPL-SCNC: 3.9 MMOL/L (ref 3.5–5.2)
SODIUM SERPL-SCNC: 133 MMOL/L (ref 136–145)

## 2022-05-31 PROCEDURE — 25010000002 ENOXAPARIN PER 10 MG: Performed by: STUDENT IN AN ORGANIZED HEALTH CARE EDUCATION/TRAINING PROGRAM

## 2022-05-31 PROCEDURE — 83735 ASSAY OF MAGNESIUM: CPT | Performed by: STUDENT IN AN ORGANIZED HEALTH CARE EDUCATION/TRAINING PROGRAM

## 2022-05-31 PROCEDURE — 82962 GLUCOSE BLOOD TEST: CPT

## 2022-05-31 PROCEDURE — 80048 BASIC METABOLIC PNL TOTAL CA: CPT | Performed by: STUDENT IN AN ORGANIZED HEALTH CARE EDUCATION/TRAINING PROGRAM

## 2022-05-31 PROCEDURE — 63710000001 INSULIN DETEMIR PER 5 UNITS: Performed by: STUDENT IN AN ORGANIZED HEALTH CARE EDUCATION/TRAINING PROGRAM

## 2022-05-31 PROCEDURE — 25010000002 VANCOMYCIN 5 G RECONSTITUTED SOLUTION: Performed by: STUDENT IN AN ORGANIZED HEALTH CARE EDUCATION/TRAINING PROGRAM

## 2022-05-31 PROCEDURE — 63710000001 INSULIN ASPART PER 5 UNITS: Performed by: STUDENT IN AN ORGANIZED HEALTH CARE EDUCATION/TRAINING PROGRAM

## 2022-05-31 PROCEDURE — 99232 SBSQ HOSP IP/OBS MODERATE 35: CPT | Performed by: STUDENT IN AN ORGANIZED HEALTH CARE EDUCATION/TRAINING PROGRAM

## 2022-05-31 RX ORDER — ATORVASTATIN CALCIUM 40 MG/1
40 TABLET, FILM COATED ORAL NIGHTLY
Status: DISCONTINUED | OUTPATIENT
Start: 2022-05-31 | End: 2022-06-02 | Stop reason: HOSPADM

## 2022-05-31 RX ORDER — INSULIN ASPART 100 [IU]/ML
15 INJECTION, SOLUTION INTRAVENOUS; SUBCUTANEOUS
Status: DISCONTINUED | OUTPATIENT
Start: 2022-05-31 | End: 2022-06-02 | Stop reason: HOSPADM

## 2022-05-31 RX ADMIN — DOCUSATE SODIUM 50 MG AND SENNOSIDES 8.6 MG 2 TABLET: 8.6; 5 TABLET, FILM COATED ORAL at 20:14

## 2022-05-31 RX ADMIN — METFORMIN HYDROCHLORIDE 500 MG: 500 TABLET ORAL at 16:15

## 2022-05-31 RX ADMIN — INSULIN ASPART 2 UNITS: 100 INJECTION, SOLUTION INTRAVENOUS; SUBCUTANEOUS at 07:45

## 2022-05-31 RX ADMIN — PANTOPRAZOLE SODIUM 40 MG: 40 TABLET, DELAYED RELEASE ORAL at 05:45

## 2022-05-31 RX ADMIN — DOCUSATE SODIUM 50 MG AND SENNOSIDES 8.6 MG 2 TABLET: 8.6; 5 TABLET, FILM COATED ORAL at 07:44

## 2022-05-31 RX ADMIN — FUROSEMIDE 40 MG: 40 TABLET ORAL at 07:44

## 2022-05-31 RX ADMIN — VANCOMYCIN HYDROCHLORIDE 1750 MG: 5 INJECTION, POWDER, LYOPHILIZED, FOR SOLUTION INTRAVENOUS at 07:44

## 2022-05-31 RX ADMIN — VANCOMYCIN HYDROCHLORIDE 1750 MG: 5 INJECTION, POWDER, LYOPHILIZED, FOR SOLUTION INTRAVENOUS at 20:14

## 2022-05-31 RX ADMIN — INSULIN ASPART 3 UNITS: 100 INJECTION, SOLUTION INTRAVENOUS; SUBCUTANEOUS at 16:16

## 2022-05-31 RX ADMIN — INSULIN ASPART 10 UNITS: 100 INJECTION, SOLUTION INTRAVENOUS; SUBCUTANEOUS at 07:45

## 2022-05-31 RX ADMIN — Medication 10 ML: at 20:14

## 2022-05-31 RX ADMIN — ASPIRIN 81 MG: 81 TABLET, COATED ORAL at 07:44

## 2022-05-31 RX ADMIN — ENOXAPARIN SODIUM 40 MG: 40 INJECTION SUBCUTANEOUS at 20:14

## 2022-05-31 RX ADMIN — INSULIN ASPART 4 UNITS: 100 INJECTION, SOLUTION INTRAVENOUS; SUBCUTANEOUS at 10:15

## 2022-05-31 RX ADMIN — INSULIN DETEMIR 50 UNITS: 100 INJECTION, SOLUTION SUBCUTANEOUS at 07:46

## 2022-05-31 RX ADMIN — ATORVASTATIN CALCIUM 40 MG: 40 TABLET, FILM COATED ORAL at 20:14

## 2022-05-31 RX ADMIN — NADOLOL 20 MG: 40 TABLET ORAL at 07:44

## 2022-05-31 RX ADMIN — INSULIN ASPART 15 UNITS: 100 INJECTION, SOLUTION INTRAVENOUS; SUBCUTANEOUS at 16:15

## 2022-05-31 RX ADMIN — Medication 1 TABLET: at 07:44

## 2022-05-31 RX ADMIN — INSULIN ASPART 10 UNITS: 100 INJECTION, SOLUTION INTRAVENOUS; SUBCUTANEOUS at 10:15

## 2022-06-01 LAB
ANION GAP SERPL CALCULATED.3IONS-SCNC: 6.7 MMOL/L (ref 5–15)
BUN SERPL-MCNC: 19 MG/DL (ref 6–20)
BUN/CREAT SERPL: 27.1 (ref 7–25)
CALCIUM SPEC-SCNC: 7.8 MG/DL (ref 8.6–10.5)
CHLORIDE SERPL-SCNC: 103 MMOL/L (ref 98–107)
CO2 SERPL-SCNC: 24.3 MMOL/L (ref 22–29)
CREAT SERPL-MCNC: 0.7 MG/DL (ref 0.76–1.27)
EGFRCR SERPLBLD CKD-EPI 2021: 107.5 ML/MIN/1.73
GLUCOSE BLDC GLUCOMTR-MCNC: 203 MG/DL (ref 70–130)
GLUCOSE BLDC GLUCOMTR-MCNC: 279 MG/DL (ref 70–130)
GLUCOSE BLDC GLUCOMTR-MCNC: 91 MG/DL (ref 70–130)
GLUCOSE SERPL-MCNC: 174 MG/DL (ref 65–99)
POTASSIUM SERPL-SCNC: 3.7 MMOL/L (ref 3.5–5.2)
SODIUM SERPL-SCNC: 134 MMOL/L (ref 136–145)

## 2022-06-01 PROCEDURE — 25010000002 ENOXAPARIN PER 10 MG: Performed by: STUDENT IN AN ORGANIZED HEALTH CARE EDUCATION/TRAINING PROGRAM

## 2022-06-01 PROCEDURE — 82962 GLUCOSE BLOOD TEST: CPT

## 2022-06-01 PROCEDURE — 25010000002 VANCOMYCIN 5 G RECONSTITUTED SOLUTION: Performed by: STUDENT IN AN ORGANIZED HEALTH CARE EDUCATION/TRAINING PROGRAM

## 2022-06-01 PROCEDURE — 97530 THERAPEUTIC ACTIVITIES: CPT

## 2022-06-01 PROCEDURE — 97110 THERAPEUTIC EXERCISES: CPT

## 2022-06-01 PROCEDURE — 97535 SELF CARE MNGMENT TRAINING: CPT

## 2022-06-01 PROCEDURE — 63710000001 INSULIN ASPART PER 5 UNITS: Performed by: STUDENT IN AN ORGANIZED HEALTH CARE EDUCATION/TRAINING PROGRAM

## 2022-06-01 PROCEDURE — 99232 SBSQ HOSP IP/OBS MODERATE 35: CPT | Performed by: STUDENT IN AN ORGANIZED HEALTH CARE EDUCATION/TRAINING PROGRAM

## 2022-06-01 PROCEDURE — 80048 BASIC METABOLIC PNL TOTAL CA: CPT | Performed by: STUDENT IN AN ORGANIZED HEALTH CARE EDUCATION/TRAINING PROGRAM

## 2022-06-01 PROCEDURE — 63710000001 INSULIN DETEMIR PER 5 UNITS: Performed by: STUDENT IN AN ORGANIZED HEALTH CARE EDUCATION/TRAINING PROGRAM

## 2022-06-01 RX ORDER — SPIRONOLACTONE 25 MG/1
50 TABLET ORAL DAILY
Status: DISCONTINUED | OUTPATIENT
Start: 2022-06-01 | End: 2022-06-02 | Stop reason: HOSPADM

## 2022-06-01 RX ORDER — SPIRONOLACTONE 100 MG/1
100 TABLET, FILM COATED ORAL DAILY
Status: DISCONTINUED | OUTPATIENT
Start: 2022-06-01 | End: 2022-06-01

## 2022-06-01 RX ADMIN — INSULIN DETEMIR 50 UNITS: 100 INJECTION, SOLUTION SUBCUTANEOUS at 08:22

## 2022-06-01 RX ADMIN — VANCOMYCIN HYDROCHLORIDE 1750 MG: 5 INJECTION, POWDER, LYOPHILIZED, FOR SOLUTION INTRAVENOUS at 08:21

## 2022-06-01 RX ADMIN — PANTOPRAZOLE SODIUM 40 MG: 40 TABLET, DELAYED RELEASE ORAL at 05:19

## 2022-06-01 RX ADMIN — METFORMIN HYDROCHLORIDE 500 MG: 500 TABLET ORAL at 08:21

## 2022-06-01 RX ADMIN — Medication 10 ML: at 08:21

## 2022-06-01 RX ADMIN — INSULIN ASPART 4 UNITS: 100 INJECTION, SOLUTION INTRAVENOUS; SUBCUTANEOUS at 12:04

## 2022-06-01 RX ADMIN — NADOLOL 20 MG: 40 TABLET ORAL at 08:21

## 2022-06-01 RX ADMIN — ASPIRIN 81 MG: 81 TABLET, COATED ORAL at 08:21

## 2022-06-01 RX ADMIN — FUROSEMIDE 40 MG: 40 TABLET ORAL at 08:21

## 2022-06-01 RX ADMIN — Medication 10 MG: at 20:53

## 2022-06-01 RX ADMIN — HYDROCODONE BITARTRATE AND ACETAMINOPHEN 1 TABLET: 7.5; 325 TABLET ORAL at 23:24

## 2022-06-01 RX ADMIN — Medication 10 ML: at 20:54

## 2022-06-01 RX ADMIN — ATORVASTATIN CALCIUM 40 MG: 40 TABLET, FILM COATED ORAL at 20:53

## 2022-06-01 RX ADMIN — METFORMIN HYDROCHLORIDE 500 MG: 500 TABLET ORAL at 17:10

## 2022-06-01 RX ADMIN — INSULIN ASPART 3 UNITS: 100 INJECTION, SOLUTION INTRAVENOUS; SUBCUTANEOUS at 08:21

## 2022-06-01 RX ADMIN — VANCOMYCIN HYDROCHLORIDE 1750 MG: 5 INJECTION, POWDER, LYOPHILIZED, FOR SOLUTION INTRAVENOUS at 20:54

## 2022-06-01 RX ADMIN — ENOXAPARIN SODIUM 40 MG: 40 INJECTION SUBCUTANEOUS at 20:53

## 2022-06-01 RX ADMIN — HYDROCODONE BITARTRATE AND ACETAMINOPHEN 1 TABLET: 7.5; 325 TABLET ORAL at 14:26

## 2022-06-01 RX ADMIN — INSULIN ASPART 15 UNITS: 100 INJECTION, SOLUTION INTRAVENOUS; SUBCUTANEOUS at 12:04

## 2022-06-01 RX ADMIN — SPIRONOLACTONE 50 MG: 25 TABLET ORAL at 12:04

## 2022-06-01 RX ADMIN — INSULIN ASPART 15 UNITS: 100 INJECTION, SOLUTION INTRAVENOUS; SUBCUTANEOUS at 08:20

## 2022-06-01 RX ADMIN — DOCUSATE SODIUM 50 MG AND SENNOSIDES 8.6 MG 2 TABLET: 8.6; 5 TABLET, FILM COATED ORAL at 20:54

## 2022-06-01 RX ADMIN — Medication 1 TABLET: at 08:21

## 2022-06-01 NOTE — PLAN OF CARE
Goal Outcome Evaluation:           Progress: improving  Outcome Evaluation: Patient has had no concerns this shift, wound care has been done per orders, VSS. Pain medication has been given 1x prior to wound care.

## 2022-06-01 NOTE — CASE MANAGEMENT/SOCIAL WORK
Discharge Planning Assessment   [place]     Patient Name: Melchor Hardwick  MRN: [id number]  Today's Date: 6/1/2022    Admit Date: 5/10/2022     Discharge Plan     Row Name 06/01/22 1143       Plan    Plan Inpatient rehab plans to start pre-authorization on this date. SS to follow.               Continued Care and Services - Admitted Since 5/10/2022     Durable Medical Equipment     Service Provider Request Status Selected Services Address Phone Fax Patient Preferred    [facility]  Pending - No Request Sent N/A [id number] [place] [address] [phone] --              Expected Discharge Date and Time     Expected Discharge Date Expected Discharge Time    Jun 2, 2022         ARNAV Ortiz

## 2022-06-01 NOTE — PROGRESS NOTES
[facility] HOSPITALIST PROGRESS NOTE     Patient Identification:  Name:  Melchor Hardwick  Age:  57 y.o.  Sex:  male  :  1965  MRN:  [id number]  Visit Number:  [id number]  ROOM: [address] [place] [address]     Primary Care Provider:  Missy Up APRN    Length of stay in inpatient status:  22    Subjective     Chief Compliant:    Chief Complaint   Patient presents with   • Foot Pain       History of Presenting Illness:    Patient seen in follow-up for left lower extremity osteomyelitis.  He notes pain has been well controlled on current pain regimen.  He denies any acute complaints aside from overall weakness and scrotal edema which is decreased over the last several days with diuretic therapy.  Afebrile overnight.  No adverse events noted overnight.  Medically stable, awaiting placement.    Objective     Current Hospital Meds:aspirin, 81 mg, Oral, Daily  atorvastatin, 40 mg, Oral, Nightly  enoxaparin, 40 mg, Subcutaneous, Nightly  furosemide, 40 mg, Oral, Daily  Insulin Aspart, 0-7 Units, Subcutaneous, TID AC  Insulin Aspart, 15 Units, Subcutaneous, TID AC  insulin detemir, 50 Units, Subcutaneous, Daily  metFORMIN, 500 mg, Oral, BID With Meals  multivitamin, 1 tablet, Oral, Daily  nadolol, 20 mg, Oral, Q24H  pantoprazole, 40 mg, Oral, Q AM  senna-docusate sodium, 2 tablet, Oral, BID  sodium chloride, 10 mL, Intravenous, Q12H  sodium chloride, 10 mL, Intravenous, Q12H  vancomycin, 1,750 mg, Intravenous, Q12H         Current Antimicrobial Therapy:  Anti-Infectives (From admission, onward)    Ordered     Dose/Rate Route Frequency Start Stop    22 1014  vancomycin 1750 mg/500 mL 0.9% NS IVPB ([facility])        Ordering Provider: Robby Quinones DO    1,750 mg  over 120 Minutes Intravenous Every 12 Hours 22 2100 22 1136  clindamycin (CLEOCIN) 900 mg in dextrose 5% 50 mL IVPB (premix)        Ordering Provider: Marilynn Giordano MD    900 mg  50 mL/hr over 60 Minutes  Intravenous Every 8 Hours 05/11/22 1400 05/13/22 0616    05/11/22 1136  cefepime (MAXIPIME) 2 g/100 mL 0.9% NS (mbp)        Ordering Provider: Marilynn Giordano MD    2 g  200 mL/hr over 30 Minutes Intravenous Once 05/11/22 1200 05/11/22 1338    05/10/22 1826  vancomycin 2000 mg/500 mL 0.9% NS IVPB ([facility])        Ordering Provider: Joan Zarate PA-C    20 mg/kg × 103 kg  over 120 Minutes Intravenous Once 05/10/22 1828 05/10/22 2130    05/10/22 1826  piperacillin-tazobactam (ZOSYN) IVPB 4.5 g in 100 mL NS VTB        Ordering Provider: Joan Zarate PA-C    4.5 g  over 30 Minutes Intravenous Once 05/10/22 1828 05/10/22 1855        Current Diuretic Therapy:  Diuretics (From admission, onward)    Ordered     Dose/Rate Route Frequency Start Stop    05/30/22 0816  furosemide (LASIX) tablet 40 mg        Ordering Provider: Robby Quinones DO    40 mg Oral Daily 05/30/22 0915      05/27/22 1446  furosemide (LASIX) injection 20 mg        Ordering Provider: Ja Bowers MD    20 mg Intravenous Once 05/27/22 1545 05/27/22 1452    05/26/22 1459  furosemide (LASIX) injection 20 mg        Ordering Provider: Ja Bowers MD    20 mg Intravenous Every 12 Hours 05/26/22 1800 05/27/22 0809    05/25/22 1702  furosemide (LASIX) injection 20 mg        Ordering Provider: Ja Bowers MD    20 mg Intravenous Every 12 Hours 05/25/22 1800 05/26/22 0530    05/24/22 1802  furosemide (LASIX) injection 20 mg        Ordering Provider: Ja Bowers MD    20 mg Intravenous Every 12 Hours 05/24/22 1900 05/25/22 0615    05/23/22 1342  furosemide (LASIX) injection 20 mg        Ordering Provider: Ja Bowers MD    20 mg Intravenous Once 05/23/22 1400 05/23/22 1421    05/22/22 1129  furosemide (LASIX) injection 40 mg        Ordering Provider: James Montiel MD    40 mg Intravenous Once 05/22/22 1215 05/22/22 1302    05/21/22 1616  furosemide (LASIX) injection 40 mg        Ordering Provider: James Montiel MD     40 mg Intravenous Once 05/21/22 1715 05/21/22 1706    05/20/22 1507  furosemide (LASIX) injection 40 mg        Ordering Provider: James Montiel MD    40 mg Intravenous Once 05/20/22 1600 05/20/22 1543        ----------------------------------------------------------------------------------------------------------------------  Vital Signs:  Temp:  [97.9 °F (36.6 °C)-98.1 °F (36.7 °C)] 98 °F (36.7 °C)  Heart Rate:  [58-66] 66  Resp:  [17-20] 18  BP: (101-142)/(55-72) 131/60  SpO2:  [94 %-98 %] 94 %  on   ;   Device (Oxygen Therapy): room air  Body mass index is 36.29 kg/m².    Wt Readings from Last 3 Encounters:   06/01/22 115 kg (252 lb 14.4 oz)     Intake & Output (last 3 days)       05/29 0701 05/30 0700 05/30 0701 05/31 0700 05/31 0701 06/01 0700 06/01 0701  06/02 0700    P.O. 1340 720 870 360    I.V. (mL/kg)   500 (4.3)     Total Intake(mL/kg) 1340 (11.6) 720 (6.1) 1370 (11.9) 360 (3.1)    Urine (mL/kg/hr) 3400 (1.2) 2750 (1) 4850 (1.8)     Stool 0 0 0     Total Output 3400 2750 4850     Net -2060 -2030 -3480 +360            Stool Unmeasured Occurrence 1 x 2 x 4 x 2 x        Diet Regular; Cardiac, Consistent Carbohydrate, Daily Fluid Restriction; 1500 mL Fluid Per Day; High Protein  ----------------------------------------------------------------------------------------------------------------------  Physical exam:  Constitutional: Middle-age male, appears older than stated age,, Well-developed and well-nourished, resting comfortably in bed, no acute distress.      HENT:  Head:  Normocephalic and atraumatic.  Mouth:  Moist mucous membranes.    Eyes:  Conjunctivae and EOM are normal. No scleral icterus.   Cardiovascular:  Normal rate, regular rhythm and normal heart sounds with no murmur. No JVD.   Pulmonary/Chest:  No respiratory distress, no wheezes, mild bibasilar crackles, with normal breath sounds and good air movement. Unlabored. No accessory muscle use.  Abdominal:  Soft. No distension and no  tenderness.  Bowel sounds present. No rebound or guarding.   Musculoskeletal:  No tenderness, left lower extremity wrapped in Ace bandage, no red or swollen joints anywhere.    Neurological:  Alert and oriented to person, place, and time.  No cranial nerve deficit.   Nonfocal.   Skin:  Skin is warm and dry. No rash noted. No pallor.   Peripheral vascular:  No clubbing, no cyanosis, no edema. Pedal and tibial pulses 2/4 bilaterally.   : Improved scrotal edema, Ortiz  ----------------------------------------------------------------------------------------------------------------------  Results from last 7 days   Lab Units 05/30/22 0628 05/28/22  0520 05/27/22  0159 05/26/22  0300   CRP mg/dL  --   --  5.05* 5.64*   WBC 10*3/mm3 3.63 3.59 4.70 4.15   HEMOGLOBIN g/dL 9.1* 9.1* 9.2* 9.0*   HEMATOCRIT % 28.1* 29.1* 28.5* 28.8*   MCV fL 78.5* 79.1 78.3* 78.7*   MCHC g/dL 32.4 31.3* 32.3 31.3*   PLATELETS 10*3/mm3 120* 127* 136* 139*   INR  1.04  --   --   --          Results from last 7 days   Lab Units 06/01/22  0452 05/31/22  1140 05/30/22  0628 05/29/22  0729 05/28/22  0520 05/27/22  0159 05/26/22  0300   SODIUM mmol/L 134* 133* 136 137 134*   < > 133*   POTASSIUM mmol/L 3.7 3.9 4.1 4.0 4.0   < > 4.3   MAGNESIUM mg/dL  --  1.9  --  2.0 1.9   < > 1.9   CHLORIDE mmol/L 103 103 103 103 101   < > 100   CO2 mmol/L 24.3 23.7 24.7 27.0 25.7   < > 26.5   BUN mg/dL 19 20 15 16 19   < > 19   CREATININE mg/dL 0.70* 0.78 0.65* 0.64* 0.62*   < > 0.72*   CALCIUM mg/dL 7.8* 7.7* 7.8* 7.9* 7.8*   < > 7.7*   GLUCOSE mg/dL 174* 232* 197* 116* 177*   < > 223*   ALBUMIN g/dL  --   --  1.92*  --   --   --  1.75*   BILIRUBIN mg/dL  --   --  0.3  --   --   --  0.4   ALK PHOS U/L  --   --  337*  --   --   --  343*   AST (SGOT) U/L  --   --  67*  --   --   --  61*   ALT (SGPT) U/L  --   --  43*  --   --   --  38    < > = values in this interval not displayed.   Estimated Creatinine Clearance: 147.9 mL/min (A) (by C-G formula based on SCr of  0.7 mg/dL (L)).  No results found for: AMMONIA  Results from last 7 days   Lab Units 05/30/22  0628   CK TOTAL U/L 36             Glucose   Date/Time Value Ref Range Status   06/01/2022 0625 203 (H) 70 - 130 mg/dL Final     Comment:     Meter: [id number] : [id number] VITOR ENNIS   05/31/2022 1614 242 (H) 70 - 130 mg/dL Final     Comment:     Meter: [id number] : [id number] Edna Bell L   05/31/2022 1014 254 (H) 70 - 130 mg/dL Final     Comment:     Meter: [id number] : [id number] Edna Bell L   05/31/2022 0622 175 (H) 70 - 130 mg/dL Final     Comment:     Meter: [id number] : [id number] VITOR ENNIS   05/30/2022 1619 198 (H) 70 - 130 mg/dL Final     Comment:     Meter: [id number] : [id number] Alejandra Koehler   05/30/2022 1022 252 (H) 70 - 130 mg/dL Final     Comment:     Meter: [id number] : [id number] Alejandra Koehler   05/30/2022 0741 191 (H) 70 - 130 mg/dL Final     Comment:     Meter: [id number] : [id number] Shakir LAWS   05/29/2022 1628 239 (H) 70 - 130 mg/dL Final     Comment:     Meter: [id number] : [id number] Ida Mobley     Lab Results   Component Value Date    TSH 4.700 (H) 05/30/2022    FREET4 0.87 (L) 05/30/2022     No results found for: PREGTESTUR, PREGSERUM, HCG, HCGQUANT  Pain Management Panel     Pain Management Panel Latest Ref Rng & Units 5/24/2022 5/11/2022    CREATININE UR mg/dL 91.0 -    AMPHETAMINES SCREEN, URINE Negative - Negative    BARBITURATES SCREEN Negative - Negative    BENZODIAZEPINE SCREEN, URINE Negative - Negative    BUPRENORPHINEUR Negative - Negative    COCAINE SCREEN, URINE Negative - Negative    METHADONE SCREEN, URINE Negative - Negative    METHAMPHETAMINEUR Negative - Negative        Brief Urine Lab Results  (Last result in the past 365 days)      Color   Clarity   Blood   Leuk Est   Nitrite   Protein   CREAT   Urine HCG        05/24/22 1839             91.0             No results found for: BLOODCX  No results found for: URINECX  No  results found for: WOUNDCX  No results found for: STOOLCX  No results found for: RESPCX  No results found for: AFBCX  Results from last 7 days   Lab Units 05/27/22  0159 05/26/22  0300   CRP mg/dL 5.05* 5.64*       I have personally looked at the labs and they are summarized above.  ----------------------------------------------------------------------------------------------------------------------  Detailed radiology reports for the last 24 hours:  Imaging Results (Last 24 Hours)     ** No results found for the last 24 hours. **        Assessment & Plan      Patient is a 57-year-old male with history significant for type 2 diabetes mellitus, hypertension who presented to the ER with left foot pain after injury 10 days PTA.    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  Was previously seen by outpatient urgent care where x-rays were negative and he completed 10 days of antibiotic therapy PTA.  --Admission labs: WBC 15 K, lactate 3, CRP 22, sodium 127  --Bone culture finalized MRSA  --Left upper extremity PICC placed 5/20  --Continue IV vancomycin through 6/23  --Podiatry following, appreciate recommendations  --ID following, appreciate recommendations    #Uncontrolled type 2 diabetes mellitus  --A1c 13%, A.m. glucose 175, continue basal and mealtime with SSI, increase mealtime to 15 units 3 times daily, add metformin, adjust as needed    #PAZ cirrhosis  #Portal HTN  #Anasarca  --Net negative approximately 25 L over the past week with significant improvement of symptoms, still has significant scrotal edema on exam.  UA without  nephrotic range proteinuria, echo unremarkable.  --AI w/u negative, alpha antitrypsin level pending  --CT abdomen/pelvis noted advanced cirrhosis with portal hypertension  --Avoid ACE/ARB in the setting of cirrhosis  --Continue low-dose nadolol, has been able to tolerate thus far-heart rate in the 60s therefore will not increase  --continue p.o. Lasix for volume control, monitor potassium, add Aldactone 50 mg, increase 200 mg if BP tolerates  --Will need outpatient endoscopy evaluation for varices    #Chronic microcytic anemia, iron, B/12/folate w/u unremarkable  #Essential hypertension, controlled  #Hyperlipidemia, high intensity statin  #Scrotal edema due to the above, evaluated by urology, recommended elevation of scrotum, improving  #Obesity, BMI 36, complicates all aspects of care    CHECKLIST:  Abx: Vancomycin-end date 6/23  VTE: Lovenox  GI ppx: PPI  Diet: Consistent carb  Code: CPR, full  Dispo: Patient has medically improved. Inpatient rehab consult placed.  Voiding trial, remove Ortiz today.  Case management following for placement.    Robby Quinones DO  [facility] Hospitalist  06/01/22  10:15 EDT

## 2022-06-01 NOTE — PROGRESS NOTES
PROGRESS NOTE         Patient Identification:  Name:  Melchor Hardwick  Age:  57 y.o.  Sex:  male  :  1965  MRN:  [id number]  Visit Number:  [id number]  Primary Care Provider:  Missy Up APRN         LOS: 22 days       ----------------------------------------------------------------------------------------------------------------------  Subjective       Chief Complaints:    Foot Pain        Interval History:      Patient resting comfortably in bed this morning.  No issues or complaints.  Afebrile, no diarrhea.  Awaiting rehab placement.    Review of Systems:      Constitutional: no fever, chills and night sweats. No appetite change or unexpected weight change. No fatigue.  Eyes: no eye drainage, itching or redness.  HEENT: no mouth sores, dysphagia or nose bleed.  Respiratory: no for shortness of breath, cough or production of sputum.  Cardiovascular: no chest pain, no palpitations, no orthopnea.  Gastrointestinal: no nausea, vomiting or diarrhea. No abdominal pain, hematemesis or rectal bleeding.  Genitourinary: no dysuria or polyuria. Scrotal edema, improving.  Hematologic/lymphatic: no lymph node abnormalities, no easy bruising or easy bleeding.  Musculoskeletal: Left lower extremity pain status post midfoot amputation.  Skin: No rash and no itching.  Neurological: no loss of consciousness, no seizure, no headache.  Behavioral/Psych: no depression or suicidal ideation.  Endocrine: no hot flashes.  Immunologic: negative.    ----------------------------------------------------------------------------------------------------------------------      Objective       Current [facility] Meds:  aspirin, 81 mg, Oral, Daily  atorvastatin, 40 mg, Oral, Nightly  enoxaparin, 40 mg, Subcutaneous, Nightly  furosemide, 40 mg, Oral, Daily  Insulin Aspart, 0-7 Units, Subcutaneous, TID AC  Insulin Aspart, 15 Units, Subcutaneous, TID AC  insulin detemir, 50 Units, Subcutaneous, Daily  metFORMIN, 500 mg, Oral, BID  With Meals  multivitamin, 1 tablet, Oral, Daily  nadolol, 20 mg, Oral, Q24H  pantoprazole, 40 mg, Oral, Q AM  senna-docusate sodium, 2 tablet, Oral, BID  sodium chloride, 10 mL, Intravenous, Q12H  sodium chloride, 10 mL, Intravenous, Q12H  spironolactone, 50 mg, Oral, Daily  vancomycin, 1,750 mg, Intravenous, Q12H         ----------------------------------------------------------------------------------------------------------------------    Vital Signs:  Temp:  [97.9 °F (36.6 °C)-98.1 °F (36.7 °C)] 98 °F (36.7 °C)  Heart Rate:  [58-66] 66  Resp:  [17-20] 18  BP: (101-142)/(55-72) 131/60  No data found.  SpO2 Percentage    05/31/22 1900 06/01/22 0300 06/01/22 0600   SpO2: 98% 98% 94%     SpO2:  [94 %-98 %] 94 %  on   ;   Device (Oxygen Therapy): room air    Body mass index is 36.29 kg/m².  Wt Readings from Last 3 Encounters:   06/01/22 115 kg (252 lb 14.4 oz)        Intake/Output Summary (Last 24 hours) at 6/1/2022 1145  Last data filed at 6/1/2022 0800  Gross per 24 hour   Intake 1730 ml   Output 4850 ml   Net -3120 ml     Diet Regular; Cardiac, Consistent Carbohydrate, Daily Fluid Restriction; 1500 mL Fluid Per Day; High Protein  ----------------------------------------------------------------------------------------------------------------------      Physical Exam:       Constitutional: Obese  male is sitting up in bed on room air in no apparent distress.   HENT:  Head: Normocephalic and atraumatic.  Mouth:  Moist mucous membranes.    Eyes:  Conjunctivae and EOM are normal.  No scleral icterus.  Neck:  Neck supple.  No JVD present.    Cardiovascular:  Normal rate, regular rhythm and normal heart sounds with no murmur. No edema.  Pulmonary/Chest:  No respiratory distress, no wheezes, no crackles, with normal breath sounds and good air movement.  Abdominal:  Soft.  Bowel sounds are normal.  No tenderness.  Abdominal distention.  Musculoskeletal:  No edema, no tenderness, and no deformity.  No swelling or  redness of joints.  Left foot in bulky surgical dressing this morning.  Drain with serosanguineous drainage.  1+ edema of bilateral lower extremities.  Neurological:  Alert and oriented to person, place, and time.  No facial droop.  No slurred speech.   Skin:  Skin is warm and dry.  No rash noted.  No pallor.   Psychiatric:  Normal mood and affect.  Behavior is normal.    ----------------------------------------------------------------------------------------------------------------------  Results from last 7 days   Lab Units 05/30/22 0628   CK TOTAL U/L 36             Results from last 7 days   Lab Units 05/30/22  0628 05/28/22 0520 05/27/22 0159 05/26/22  0300   CRP mg/dL  --   --  5.05* 5.64*   WBC 10*3/mm3 3.63 3.59 4.70 4.15   HEMOGLOBIN g/dL 9.1* 9.1* 9.2* 9.0*   HEMATOCRIT % 28.1* 29.1* 28.5* 28.8*   MCV fL 78.5* 79.1 78.3* 78.7*   MCHC g/dL 32.4 31.3* 32.3 31.3*   PLATELETS 10*3/mm3 120* 127* 136* 139*   INR  1.04  --   --   --      Results from last 7 days   Lab Units 06/01/22  0452 05/31/22  1140 05/30/22  0628 05/29/22  0729 05/28/22  0520 05/27/22  0159 05/26/22  0300   SODIUM mmol/L 134* 133* 136 137 134*   < > 133*   POTASSIUM mmol/L 3.7 3.9 4.1 4.0 4.0   < > 4.3   MAGNESIUM mg/dL  --  1.9  --  2.0 1.9   < > 1.9   CHLORIDE mmol/L 103 103 103 103 101   < > 100   CO2 mmol/L 24.3 23.7 24.7 27.0 25.7   < > 26.5   BUN mg/dL 19 20 15 16 19   < > 19   CREATININE mg/dL 0.70* 0.78 0.65* 0.64* 0.62*   < > 0.72*   CALCIUM mg/dL 7.8* 7.7* 7.8* 7.9* 7.8*   < > 7.7*   GLUCOSE mg/dL 174* 232* 197* 116* 177*   < > 223*   ALBUMIN g/dL  --   --  1.92*  --   --   --  1.75*   BILIRUBIN mg/dL  --   --  0.3  --   --   --  0.4   ALK PHOS U/L  --   --  337*  --   --   --  343*   AST (SGOT) U/L  --   --  67*  --   --   --  61*   ALT (SGPT) U/L  --   --  43*  --   --   --  38    < > = values in this interval not displayed.   Estimated Creatinine Clearance: 147.9 mL/min (A) (by C-G formula based on SCr of 0.7 mg/dL (L)).  No  results found for: AMMONIA    Glucose   Date/Time Value Ref Range Status   06/01/2022 1044 279 (H) 70 - 130 mg/dL Final     Comment:     Meter: [id number] : [id number] hector brown   06/01/2022 0625 203 (H) 70 - 130 mg/dL Final     Comment:     Meter: [id number] : [id number] VITOR LOLI   05/31/2022 1614 242 (H) 70 - 130 mg/dL Final     Comment:     Meter: [id number] : [id number] Edna Bell L   05/31/2022 1014 254 (H) 70 - 130 mg/dL Final     Comment:     Meter: [id number] : [id number] Edna Bell L   05/31/2022 0622 175 (H) 70 - 130 mg/dL Final     Comment:     Meter: [id number] : [id number] VITOR LOLI   05/30/2022 1619 198 (H) 70 - 130 mg/dL Final     Comment:     Meter: [id number] : [id number] Alejandra Wayland   05/30/2022 1022 252 (H) 70 - 130 mg/dL Final     Comment:     Meter: [id number] : [id number] Alejandra Rodo   05/30/2022 0741 191 (H) 70 - 130 mg/dL Final     Comment:     Meter: [id number] : [id number] Shakir LAWS     Lab Results   Component Value Date    HGBA1C 13.00 (H) 05/10/2022     Lab Results   Component Value Date    TSH 4.700 (H) 05/30/2022    FREET4 0.87 (L) 05/30/2022       Blood Culture   Date Value Ref Range Status   05/10/2022 Staphylococcus aureus, MRSA (C)  Preliminary     Comment:     Infectious disease consultation is highly recommended to rule out distant foci of infection.  Methicillin resistant Staphylococcus aureus, Patient may be an isolation risk.   05/10/2022 Staphylococcus aureus, MRSA (C)  Preliminary     Comment:     Infectious disease consultation is highly recommended to rule out distant foci of infection.  Methicillin resistant Staphylococcus aureus, Patient may be an isolation risk.     No results found for: URINECX  Wound Culture   Date Value Ref Range Status   05/10/2022 Scant growth (1+) Staphylococcus aureus, MRSA (A)  Preliminary     Comment:     Methicillin resistant Staphylococcus aureus, Patient may be an isolation risk.      No results found for: STOOLCX  No results found for: RESPCX  Pain Management Panel       Pain Management Panel Latest Ref Rng & Units 5/24/2022 5/11/2022    CREATININE UR mg/dL 91.0 -    AMPHETAMINES SCREEN, URINE Negative - Negative    BARBITURATES SCREEN Negative - Negative    BENZODIAZEPINE SCREEN, URINE Negative - Negative    BUPRENORPHINEUR Negative - Negative    COCAINE SCREEN, URINE Negative - Negative    METHADONE SCREEN, URINE Negative - Negative    METHAMPHETAMINEUR Negative - Negative              ----------------------------------------------------------------------------------------------------------------------  Imaging Results (Last 24 Hours)       ** No results found for the last 24 hours. **            ----------------------------------------------------------------------------------------------------------------------    Assessment/Plan       ASSESSMENT:    1.  Severe sepsis with lactic acid greater than 2 on admission  2.  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  No issues or complaints.  Afebrile, no diarrhea.  Awaiting rehab placement.    Bilateral lower extremity venous duplex on 5/24/2022 showed no DVT.    Status post excisional debridement of necrotic tissue/tendon level dorsum of the foot with evacuation of purulence necrosis from 5/13/22.  Status post left midfoot amputation on 5/18/2022.  Repeat wound cultures from 5/13/2022 report growth of MRSA and yeast not Candida albicans.Tissue culture of the left foot on 5/18/2022 finalized as MRSA.  Wound culture of the left foot on 5/18/2022 finalized as MRSA.    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  X-rays at an urgent care were taken and he was informed he had hematoma.  After that time, patient noticed an ulcer had developed with progressive worsening. Dr. Colby performed I&D on 5/10/2022.  Lactic acid on admission  was elevated at 3.0.  Urinalysis on 5/11/2022 was positive with 13-20 WBCs but no culture was taken.  CT of the left lower extremity on 5/10/2022 showed marked soft tissue edema and fat stranding at the level of the foot, diffuse with scattered foci of air both within soft tissues and bony structures.  Findings are concerning for gas gangrene and osteomyelitis.  Both the midfoot and forefoot are involved.  X-ray of the left foot on 5/10/2022 showed degenerative change at the midfoot.  COVID-19 and flu A/B PCR on 5/10/2022 was negative.  Wound culture of the left foot on 5/10/2022 is so far showing gram-positive cocci on gram stain.  Blood cultures on 5/10/2022 2 out of 2 sets positive for MRSA. Blood cultures from 5/12/2022 finalized as no growth. Wound culture from 5/10/2022 finalized as MRSA.  2D echo from 5/12/2022 reports no evidence of vegetation. MRI of the left foot from 5/16/2022 reports appearance concerning for osteomyelitis involving the third metatarsal and likely adjacent cuneiform.  HIV-1/HIV-2 on 5/20/2022 was nonreactive.  Scrotum and testicles ultrasound on 5/20/2022 showed extensive scrotal wall swelling.  Abdominal ultrasound on 5/20/2022 showed splenomegaly and small volume ascites.      Recommend to continue vancomycin pharmacy to dose through 6/23/2022 for treatment of osteomyelitis. Case discussed with Dr. Colby, who agrees with need for prolonged IV antibiotic therapy. We will follow closely and adjust antibiotic therapy as appropriate.    Code Status:   Code Status and Medical Interventions:   Ordered at: 05/10/22 6876     Level Of Support Discussed With:    Patient     Code Status (Patient has no pulse and is not breathing):    CPR (Attempt to Resuscitate)     Medical Interventions (Patient has pulse or is breathing):    Full Support     ROSALINA Caballero  06/01/22  11:45 EDT

## 2022-06-01 NOTE — PLAN OF CARE
Goal Outcome Evaluation:         The patient has rested overnight. No acute events overnight. The patient denies any pain or distress. No complaints this shift. Wound care completed per MD orders. Encouraging frequent turning. Encouraged incentive spirometer use. Will continue with the plan of care.

## 2022-06-01 NOTE — THERAPY TREATMENT NOTE
Acute Care - Physical Therapy Treatment Note   [place]     Patient Name: Melchor Hardwick  : 1965  MRN: [id number]  Today's Date: 2022   Onset of Illness/Injury or Date of Surgery: 05/10/22  Visit Dx:     ICD-10-CM ICD-9-CM   1. Acute hematogenous osteomyelitis of left foot ([facility])  M86.072 730.07   2. Type 2 diabetes mellitus with hyperosmolar coma, with long-term current use of insulin ([facility])  E11.01 250.20    Z79.4 V58.67   3. Gas gangrene of foot ([facility])  A48.0 040.0   4. Class 2 severe obesity due to excess calories with serious comorbidity and body mass index (BMI) of 36.0 to 36.9 in adult ([facility])  E66.01 278.01    Z68.36 V85.36   5. Cellulitis in diabetic foot ([facility])  E11.628 250.80    L03.119 682.7   6. Other acute osteomyelitis of left foot ([facility])  M86.172 730.07     Patient Active Problem List   Diagnosis   • Hyperlipidemia   • Hypertensive disorder   • Kidney disease   • Obesity   • Type 2 diabetes mellitus ([facility])   • Gas gangrene of foot ([facility])   • Cellulitis in diabetic foot ([facility])   • Other acute osteomyelitis of left foot ([facility])   • Osteomyelitis ([facility])     Past Medical History:   Diagnosis Date   • Diabetes mellitus ([facility])    • Elevated cholesterol    • Hyperlipidemia    • Hypertension      Past Surgical History:   Procedure Laterality Date   • ABSCESS DRAINAGE      PERINEUM   • AMPUTATION FOOT Left 2022    Procedure: AMPUTATION FOOT;  Surgeon: Addison Colby MD;  Location: [facility];  Service: Podiatry;  Laterality: Left;   • INCISION AND DRAINAGE LEG Left 05/10/2022    Procedure: INCISION AND DRAINAGE LOWER EXTREMITY;  Surgeon: Addison Colby MD;  Location: [facility];  Service: Podiatry;  Laterality: Left;   • WOUND DEBRIDEMENT Left 2022    Procedure: DEBRIDEMENT FOOT;  Surgeon: Addison Colby MD;  Location: [facility];  Service: Podiatry;  Laterality: Left;     PT Assessment (last 12 hours)     PT Evaluation and Treatment     Row Name 22 1500          Physical Therapy Time and  Intention    Subjective Information complains of;weakness;fatigue  -RG     Document Type therapy note (daily note)  -RG     Mode of Treatment physical therapy  -RG     Patient Effort adequate  -RG     Symptoms Noted During/After Treatment fatigue  -RG     Comment Pt and nursing in agreement for skilled PT on this date. Pt educated and performed supine bed exercises.  He stated that it was difficult to exercise L LE.  -RG     Row Name 06/01/22 1500          General Information    Patient Profile Reviewed yes  -RG     Existing Precautions/Restrictions fall;left;non-weight bearing  -RG     Limitations/Impairments safety/cognitive  -RG     Row Name 06/01/22 1500          Cognition    Affect/Mental Status (Cognition) WNL  -RG     Orientation Status (Cognition) oriented x 4  -RG     Follows Commands (Cognition) WFL  -RG     Personal Safety Interventions gait belt;fall prevention program maintained;nonskid shoes/slippers when out of bed  -RG     Row Name 06/01/22 1500          Mobility    Left Lower Extremity (Weight-bearing Status) non weight-bearing (NWB)  -RG     Row Name 06/01/22 1500          Bed Mobility    Rolling Left [place] (Bed Mobility) verbal cues;nonverbal cues (demo/gesture);minimum assist (75% patient effort)  -RG     Rolling Right [place] (Bed Mobility) verbal cues;nonverbal cues (demo/gesture);minimum assist (75% patient effort)  -RG     Supine-Sit [place] (Bed Mobility) verbal cues;nonverbal cues (demo/gesture);moderate assist (50% patient effort)  -RG     Sit-Supine [place] (Bed Mobility) verbal cues;standby assist  -RG     Bed Mobility, Safety Issues decreased use of legs for bridging/pushing;decreased use of arms for pushing/pulling  -RG     Assistive Device (Bed Mobility) bed rails;draw sheet  -RG     Row Name 06/01/22 1500          Transfers    Transfers sit-stand transfer;stand-sit transfer  -RG     Row Name 06/01/22 1500          Safety Issues, Functional Mobility     Impairments Affecting Function (Mobility) balance;endurance/activity tolerance;strength;pain  -RG     Row Name 06/01/22 1500          Hip (Therapeutic Exercise)    Hip AROM (Therapeutic Exercise) bilateral;flexion;aBduction;aDduction;external rotation;internal rotation;supine;20 repititions  -RG     Row Name 06/01/22 1500          Knee (Therapeutic Exercise)    Knee Strengthening (Therapeutic Exercise) bilateral;flexion;extension;heel slides;SAQ (short arc quad);supine;10 repetitions;2 sets  -RG     Row Name 06/01/22 1500          Ankle (Therapeutic Exercise)    Ankle (Therapeutic Exercise) strengthening exercise  -RG     Ankle Strengthening (Therapeutic Exercise) right;dorsiflexion;plantarflexion;supine;10 repetitions;2 sets  -RG     Row Name             Wound 05/16/22 1340 coccyx Pressure Injury    Wound - Properties Group Placement Date: 05/16/22 - Placement Time: 1340  -SJ Location: coccyx  -SJ Primary Wound Type: Pressure inj  -SJ     Retired Wound - Properties Group Placement Date: 05/16/22  - Placement Time: 1340  -SJ Location: coccyx  -SJ Primary Wound Type: Pressure inj  -SJ     Retired Wound - Properties Group Date first assessed: 05/16/22  - Time first assessed: 1340  -SJ Location: coccyx  -SJ Primary Wound Type: Pressure inj  -SJ     Row Name             Wound 05/18/22 1545 Left other (see comments) foot Incision    Wound - Properties Group Placement Date: 05/18/22  -CE Placement Time: 1545  -CE Side: Left  -CE Orientation: other (see comments)  -CE, Mid-Left Foot Amputation.  Location: foot  -CE Primary Wound Type: Incision  -CE     Retired Wound - Properties Group Placement Date: 05/18/22  -CE Placement Time: 1545  -CE Side: Left  -CE Orientation: other (see comments)  -CE, Mid-Left Foot Amputation.  Location: foot  -CE Primary Wound Type: Incision  -CE     Retired Wound - Properties Group Date first assessed: 05/18/22  -CE Time first assessed: 1545  -CE Side: Left  -CE Location: foot  -CE  Primary Wound Type: Incision  -CE     Row Name 06/01/22 1500          Coping    Observed Emotional State cooperative  -RG     Verbalized Emotional State acceptance  -RG     Row Name 06/01/22 1500          Positioning and Restraints    Pre-Treatment Position in bed  -RG     Post Treatment Position bed  -RG     In Bed notified nsg;supine;call light within reach;encouraged to call for assist;with other staff  -RG           User Key  (r) = Recorded By, (t) = Taken By, (c) = Cosigned By    Initials Name Provider Type    CE Robby Ortega, RN Registered Nurse    Gisela Fountain RN Registered Nurse    Michi White PTA Physical Therapist Assistant                Physical Therapy Education                 Title: PT OT SLP Therapies (Done)     Topic: Physical Therapy (Done)     Point: Mobility training (Done)     Learning Progress Summary           Patient Acceptance, E,D, VU,NR by RG at 6/1/2022 1506      Show all documentation for this point (8)                 Point: Home exercise program (Done)     Learning Progress Summary           Patient Acceptance, E,D, VU,NR by RG at 6/1/2022 1506      Show all documentation for this point (8)                 Point: Body mechanics (Done)     Learning Progress Summary           Patient Acceptance, E,D, VU,NR by RG at 6/1/2022 1506      Show all documentation for this point (8)                 Point: Precautions (Done)     Learning Progress Summary           Patient Acceptance, E,D, VU,NR by RG at 6/1/2022 1506      Show all documentation for this point (8)                             User Key     Initials Effective Dates Name Provider Type Discipline     06/16/21 -  Michi Mckeon PTA Physical Therapist Assistant PT              PT Recommendation and Plan  Anticipated Discharge Disposition (PT): inpatient rehabilitation facility          Time Calculation:    PT Charges     Row Name 06/01/22 1507             Time Calculation    PT Received On 06/01/22  -KIERRA               Time Calculation- PT    Total Timed Code Minutes- PT 31 minute(s)  -KIERRA            User Key  (r) = Recorded By, (t) = Taken By, (c) = Cosigned By    Initials Name Provider Type    Michi White PTA Physical Therapist Assistant              Therapy Charges for Today     Code Description Service Date Service Provider Modifiers Qty    68206605764  PT THERAPEUTIC ACT EA 15 MIN 6/1/2022 Michi Mckeon PTA GP, CQ 1    58244828352 HC PT THER PROC EA 15 MIN 6/1/2022 Michi Mckeon PTA GP, CQ 1               Michi Mckeon PTA  6/1/2022

## 2022-06-01 NOTE — NURSING NOTE
Bladder training has been initiated at 1100. Educated patient to let me know if he has the urge to void.   1300: Hernandez has been unclamped patient has no urge to void at this time  1330: Hernandez reclamped   1530: hernandez unclamped, patient has had no urge to void   1600: Hernandez reclamped

## 2022-06-01 NOTE — THERAPY TREATMENT NOTE
Acute Care - Occupational Therapy Treatment Note   [place]     Patient Name: Melchor Hardwick  : 1965  MRN: [id number]  Today's Date: 2022  Onset of Illness/Injury or Date of Surgery: 05/10/22     Referring Physician: Lasha    Admit Date: 5/10/2022       ICD-10-CM ICD-9-CM   1. Acute hematogenous osteomyelitis of left foot ([facility])  M86.072 730.07   2. Type 2 diabetes mellitus with hyperosmolar coma, with long-term current use of insulin ([facility])  E11.01 250.20    Z79.4 V58.67   3. Gas gangrene of foot ([facility])  A48.0 040.0   4. Class 2 severe obesity due to excess calories with serious comorbidity and body mass index (BMI) of 36.0 to 36.9 in adult ([facility])  E66.01 278.01    Z68.36 V85.36   5. Cellulitis in diabetic foot ([facility])  E11.628 250.80    L03.119 682.7   6. Other acute osteomyelitis of left foot ([facility])  M86.172 730.07     Patient Active Problem List   Diagnosis   • Hyperlipidemia   • Hypertensive disorder   • Kidney disease   • Obesity   • Type 2 diabetes mellitus ([facility])   • Gas gangrene of foot ([facility])   • Cellulitis in diabetic foot ([facility])   • Other acute osteomyelitis of left foot ([facility])   • Osteomyelitis ([facility])     Past Medical History:   Diagnosis Date   • Diabetes mellitus ([facility])    • Elevated cholesterol    • Hyperlipidemia    • Hypertension      Past Surgical History:   Procedure Laterality Date   • ABSCESS DRAINAGE      PERINEUM   • AMPUTATION FOOT Left 2022    Procedure: AMPUTATION FOOT;  Surgeon: Addison Colby MD;  Location: [facility];  Service: Podiatry;  Laterality: Left;   • INCISION AND DRAINAGE LEG Left 05/10/2022    Procedure: INCISION AND DRAINAGE LOWER EXTREMITY;  Surgeon: Addison Colby MD;  Location: [facility] OR;  Service: Podiatry;  Laterality: Left;   • WOUND DEBRIDEMENT Left 2022    Procedure: DEBRIDEMENT FOOT;  Surgeon: Addison Colby MD;  Location: [facility];  Service: Podiatry;  Laterality: Left;         OT ASSESSMENT FLOWSHEET (last 12 hours)     OT Evaluation and Treatment      Row Name 06/01/22 1342                   OT Time and Intention    Subjective Information complains of;weakness;fatigue;swelling  -LM        Document Type therapy note (daily note)  -LM        Mode of Treatment occupational therapy  -LM        Patient Effort good  -LM        Comment Patient seen this date for adl retraining/education, fxl mobility, fxl activity tolerance.  Min assist for supine to sit on eob.  Recommended patient to perform badl while seated for safety and to increase fxl activity tolerance.  Would benefit from inpt rehab for increased BADL performance and fxl mobility.  -LM                  General Information    Existing Precautions/Restrictions fall;non-weight bearing  -LM                  Cognition    Affect/Mental Status (Cognition) WNL  -LM        Orientation Status (Cognition) oriented x 4  -LM                  Wound 05/16/22 1340 coccyx Pressure Injury    Wound - Properties Group Placement Date: 05/16/22 -SJ Placement Time: 1340  -SJ Location: coccyx  -SJ Primary Wound Type: Pressure inj  -SJ        Retired Wound - Properties Group Placement Date: 05/16/22  -SJ Placement Time: 1340  -SJ Location: coccyx  -SJ Primary Wound Type: Pressure inj  -SJ        Retired Wound - Properties Group Date first assessed: 05/16/22  - Time first assessed: 1340  -SJ Location: coccyx  -SJ Primary Wound Type: Pressure inj  -SJ                  Wound 05/18/22 1545 Left other (see comments) foot Incision    Wound - Properties Group Placement Date: 05/18/22  -CE Placement Time: 1545  -CE Side: Left  -CE Orientation: other (see comments)  -CE, Mid-Left Foot Amputation.  Location: foot  -CE Primary Wound Type: Incision  -CE        Retired Wound - Properties Group Placement Date: 05/18/22  -CE Placement Time: 1545  -CE Side: Left  -CE Orientation: other (see comments)  -CE, Mid-Left Foot Amputation.  Location: foot  -CE Primary Wound Type: Incision  -CE        Retired Wound - Properties Group Date first assessed:  05/18/22  -CE Time first assessed: 1545  -CE Side: Left  -CE Location: foot  -CE Primary Wound Type: Incision  -CE                  Positioning and Restraints    Post Treatment Position bed  -LM        In Bed call light within reach;encouraged to call for assist  -LM              User Key  (r) = Recorded By, (t) = Taken By, (c) = Cosigned By    Initials Name Effective Dates    CE Robby Ortega, JD 06/16/21 -     LM Kristine Morrell OT 06/16/21 -      Gisela Carter RN 06/16/21 -                        OT Recommendation and Plan  Planned Therapy Interventions (OT): activity tolerance training, adaptive equipment training, BADL retraining, patient/caregiver education/training, transfer/mobility retraining, strengthening exercise, ROM/therapeutic exercise  Therapy Frequency (OT): other (see comments) (prn and/or to monitor fxl progress)  Plan of Care Review  Plan of Care Reviewed With: patient  Plan of Care Reviewed With: patient        Time Calculation:     Therapy Charges for Today     Code Description Service Date Service Provider Modifiers Qty    [id number] HC OT SELF CARE/MGMT/TRAIN EA 15 MIN 6/1/2022 Kristine Morrell, OT GO 2               Kristine Morrell OT  6/1/2022

## 2022-06-01 NOTE — CASE MANAGEMENT/SOCIAL WORK
Continued Stay Note   [place]     Patient Name: Melchor Hardwick  MRN: [id number]  Today's Date: 6/1/2022    Admit Date: 5/10/2022     Discharge Plan     Row Name 06/01/22 1003       Plan    Plan CM spoke with Med Assist who gave me Temp PE card [facility] [phone].  CM gave insurance information to [facility] in Rehab to pre-[facility] for IPR stay.               Discharge Codes    No documentation.               Expected Discharge Date and Time     Expected Discharge Date Expected Discharge Time    Jun 2, 2022             Kenia Xie RN

## 2022-06-02 ENCOUNTER — HOSPITAL ENCOUNTER (INPATIENT)
Facility: HOSPITAL | Age: 57
LOS: 14 days | Discharge: HOME HEALTH | End: 2022-06-16
Attending: FAMILY MEDICINE | Admitting: FAMILY MEDICINE

## 2022-06-02 VITALS
BODY MASS INDEX: 36.94 KG/M2 | TEMPERATURE: 98 F | RESPIRATION RATE: 18 BRPM | OXYGEN SATURATION: 96 % | WEIGHT: 258 LBS | HEART RATE: 68 BPM | DIASTOLIC BLOOD PRESSURE: 59 MMHG | HEIGHT: 70 IN | SYSTOLIC BLOOD PRESSURE: 109 MMHG

## 2022-06-02 DIAGNOSIS — M86.172 OTHER ACUTE OSTEOMYELITIS OF LEFT FOOT: ICD-10-CM

## 2022-06-02 DIAGNOSIS — G72.81 CRITICAL ILLNESS MYOPATHY: Primary | ICD-10-CM

## 2022-06-02 DIAGNOSIS — M86.00 ACUTE HEMATOGENOUS OSTEOMYELITIS, UNSPECIFIED SITE: ICD-10-CM

## 2022-06-02 LAB
GLUCOSE BLDC GLUCOMTR-MCNC: 104 MG/DL (ref 70–130)
GLUCOSE BLDC GLUCOMTR-MCNC: 244 MG/DL (ref 70–130)
GLUCOSE BLDC GLUCOMTR-MCNC: 252 MG/DL (ref 70–130)
GLUCOSE BLDC GLUCOMTR-MCNC: 253 MG/DL (ref 70–130)
GLUCOSE BLDC GLUCOMTR-MCNC: 98 MG/DL (ref 70–130)
SARS-COV-2 AG RESP QL IA.RAPID: NORMAL

## 2022-06-02 PROCEDURE — 63710000001 INSULIN ASPART PER 5 UNITS: Performed by: FAMILY MEDICINE

## 2022-06-02 PROCEDURE — 63710000001 INSULIN ASPART PER 5 UNITS: Performed by: STUDENT IN AN ORGANIZED HEALTH CARE EDUCATION/TRAINING PROGRAM

## 2022-06-02 PROCEDURE — 25010000002 ENOXAPARIN PER 10 MG: Performed by: FAMILY MEDICINE

## 2022-06-02 PROCEDURE — 82962 GLUCOSE BLOOD TEST: CPT

## 2022-06-02 PROCEDURE — 97530 THERAPEUTIC ACTIVITIES: CPT

## 2022-06-02 PROCEDURE — 99239 HOSP IP/OBS DSCHRG MGMT >30: CPT | Performed by: STUDENT IN AN ORGANIZED HEALTH CARE EDUCATION/TRAINING PROGRAM

## 2022-06-02 PROCEDURE — 97535 SELF CARE MNGMENT TRAINING: CPT

## 2022-06-02 PROCEDURE — 97110 THERAPEUTIC EXERCISES: CPT

## 2022-06-02 PROCEDURE — 25010000002 VANCOMYCIN 5 G RECONSTITUTED SOLUTION: Performed by: STUDENT IN AN ORGANIZED HEALTH CARE EDUCATION/TRAINING PROGRAM

## 2022-06-02 PROCEDURE — 87426 SARSCOV CORONAVIRUS AG IA: CPT | Performed by: STUDENT IN AN ORGANIZED HEALTH CARE EDUCATION/TRAINING PROGRAM

## 2022-06-02 PROCEDURE — 99223 1ST HOSP IP/OBS HIGH 75: CPT | Performed by: FAMILY MEDICINE

## 2022-06-02 PROCEDURE — 25010000002 VANCOMYCIN 5 G RECONSTITUTED SOLUTION: Performed by: FAMILY MEDICINE

## 2022-06-02 RX ORDER — DEXTROSE MONOHYDRATE 25 G/50ML
25 INJECTION, SOLUTION INTRAVENOUS
Status: CANCELLED | OUTPATIENT
Start: 2022-06-02

## 2022-06-02 RX ORDER — SPIRONOLACTONE 25 MG/1
50 TABLET ORAL DAILY
Status: CANCELLED | OUTPATIENT
Start: 2022-06-03

## 2022-06-02 RX ORDER — ENOXAPARIN SODIUM 100 MG/ML
40 INJECTION SUBCUTANEOUS NIGHTLY
Status: CANCELLED | OUTPATIENT
Start: 2022-06-02

## 2022-06-02 RX ORDER — FUROSEMIDE 40 MG/1
40 TABLET ORAL DAILY
Status: CANCELLED | OUTPATIENT
Start: 2022-06-03

## 2022-06-02 RX ORDER — SODIUM CHLORIDE 0.9 % (FLUSH) 0.9 %
10 SYRINGE (ML) INJECTION EVERY 12 HOURS SCHEDULED
Status: CANCELLED | OUTPATIENT
Start: 2022-06-02

## 2022-06-02 RX ORDER — SODIUM CHLORIDE 0.9 % (FLUSH) 0.9 %
10 SYRINGE (ML) INJECTION AS NEEDED
Status: DISCONTINUED | OUTPATIENT
Start: 2022-06-02 | End: 2022-06-16 | Stop reason: HOSPADM

## 2022-06-02 RX ORDER — INSULIN ASPART 100 [IU]/ML
0-7 INJECTION, SOLUTION INTRAVENOUS; SUBCUTANEOUS
Status: DISCONTINUED | OUTPATIENT
Start: 2022-06-02 | End: 2022-06-16 | Stop reason: HOSPADM

## 2022-06-02 RX ORDER — ATORVASTATIN CALCIUM 40 MG/1
40 TABLET, FILM COATED ORAL NIGHTLY
Status: CANCELLED | OUTPATIENT
Start: 2022-06-02

## 2022-06-02 RX ORDER — CHOLECALCIFEROL (VITAMIN D3) 125 MCG
10 CAPSULE ORAL NIGHTLY PRN
Status: CANCELLED | OUTPATIENT
Start: 2022-06-02

## 2022-06-02 RX ORDER — NADOLOL 40 MG/1
20 TABLET ORAL
Status: DISCONTINUED | OUTPATIENT
Start: 2022-06-03 | End: 2022-06-16 | Stop reason: HOSPADM

## 2022-06-02 RX ORDER — ATORVASTATIN CALCIUM 40 MG/1
40 TABLET, FILM COATED ORAL NIGHTLY
Status: DISCONTINUED | OUTPATIENT
Start: 2022-06-02 | End: 2022-06-16 | Stop reason: HOSPADM

## 2022-06-02 RX ORDER — PANTOPRAZOLE SODIUM 40 MG/1
40 TABLET, DELAYED RELEASE ORAL
Status: DISCONTINUED | OUTPATIENT
Start: 2022-06-03 | End: 2022-06-16 | Stop reason: HOSPADM

## 2022-06-02 RX ORDER — HYDROCODONE BITARTRATE AND ACETAMINOPHEN 7.5; 325 MG/1; MG/1
1 TABLET ORAL EVERY 6 HOURS PRN
Status: CANCELLED | OUTPATIENT
Start: 2022-06-02 | End: 2022-06-05

## 2022-06-02 RX ORDER — SODIUM CHLORIDE 0.9 % (FLUSH) 0.9 %
10 SYRINGE (ML) INJECTION EVERY 12 HOURS SCHEDULED
Status: DISCONTINUED | OUTPATIENT
Start: 2022-06-02 | End: 2022-06-16 | Stop reason: HOSPADM

## 2022-06-02 RX ORDER — PANTOPRAZOLE SODIUM 40 MG/1
40 TABLET, DELAYED RELEASE ORAL
Status: CANCELLED | OUTPATIENT
Start: 2022-06-03

## 2022-06-02 RX ORDER — SODIUM CHLORIDE 0.9 % (FLUSH) 0.9 %
10 SYRINGE (ML) INJECTION AS NEEDED
Status: CANCELLED | OUTPATIENT
Start: 2022-06-02

## 2022-06-02 RX ORDER — BISACODYL 5 MG/1
5 TABLET, DELAYED RELEASE ORAL DAILY PRN
Status: DISCONTINUED | OUTPATIENT
Start: 2022-06-02 | End: 2022-06-16 | Stop reason: HOSPADM

## 2022-06-02 RX ORDER — HYDROCODONE BITARTRATE AND ACETAMINOPHEN 7.5; 325 MG/1; MG/1
1 TABLET ORAL EVERY 6 HOURS PRN
Status: DISPENSED | OUTPATIENT
Start: 2022-06-02 | End: 2022-06-13

## 2022-06-02 RX ORDER — ASPIRIN 81 MG/1
81 TABLET ORAL DAILY
Status: DISCONTINUED | OUTPATIENT
Start: 2022-06-03 | End: 2022-06-16 | Stop reason: HOSPADM

## 2022-06-02 RX ORDER — DIPHENOXYLATE HYDROCHLORIDE AND ATROPINE SULFATE 2.5; .025 MG/1; MG/1
1 TABLET ORAL DAILY
Status: DISCONTINUED | OUTPATIENT
Start: 2022-06-03 | End: 2022-06-16 | Stop reason: HOSPADM

## 2022-06-02 RX ORDER — SPIRONOLACTONE 25 MG/1
50 TABLET ORAL DAILY
Status: DISCONTINUED | OUTPATIENT
Start: 2022-06-03 | End: 2022-06-16 | Stop reason: HOSPADM

## 2022-06-02 RX ORDER — INSULIN ASPART 100 [IU]/ML
0-7 INJECTION, SOLUTION INTRAVENOUS; SUBCUTANEOUS
Status: CANCELLED | OUTPATIENT
Start: 2022-06-02

## 2022-06-02 RX ORDER — LOSARTAN POTASSIUM 100 MG/1
1 TABLET ORAL DAILY
COMMUNITY
Start: 2022-02-10 | End: 2022-06-16 | Stop reason: HOSPADM

## 2022-06-02 RX ORDER — DIPHENOXYLATE HYDROCHLORIDE AND ATROPINE SULFATE 2.5; .025 MG/1; MG/1
1 TABLET ORAL DAILY
Status: CANCELLED | OUTPATIENT
Start: 2022-06-03

## 2022-06-02 RX ORDER — AMOXICILLIN 250 MG
2 CAPSULE ORAL 2 TIMES DAILY
Status: DISCONTINUED | OUTPATIENT
Start: 2022-06-02 | End: 2022-06-16 | Stop reason: HOSPADM

## 2022-06-02 RX ORDER — INSULIN ASPART 100 [IU]/ML
15 INJECTION, SOLUTION INTRAVENOUS; SUBCUTANEOUS
Status: CANCELLED | OUTPATIENT
Start: 2022-06-02

## 2022-06-02 RX ORDER — POLYETHYLENE GLYCOL 3350 17 G/17G
17 POWDER, FOR SOLUTION ORAL DAILY PRN
Status: DISCONTINUED | OUTPATIENT
Start: 2022-06-02 | End: 2022-06-16 | Stop reason: HOSPADM

## 2022-06-02 RX ORDER — BISACODYL 10 MG
10 SUPPOSITORY, RECTAL RECTAL DAILY PRN
Status: DISCONTINUED | OUTPATIENT
Start: 2022-06-02 | End: 2022-06-16 | Stop reason: HOSPADM

## 2022-06-02 RX ORDER — NICOTINE POLACRILEX 4 MG
15 LOZENGE BUCCAL
Status: DISCONTINUED | OUTPATIENT
Start: 2022-06-02 | End: 2022-06-16 | Stop reason: HOSPADM

## 2022-06-02 RX ORDER — CHOLECALCIFEROL (VITAMIN D3) 125 MCG
10 CAPSULE ORAL NIGHTLY PRN
Status: DISCONTINUED | OUTPATIENT
Start: 2022-06-02 | End: 2022-06-16 | Stop reason: HOSPADM

## 2022-06-02 RX ORDER — ENOXAPARIN SODIUM 100 MG/ML
40 INJECTION SUBCUTANEOUS NIGHTLY
Status: DISCONTINUED | OUTPATIENT
Start: 2022-06-02 | End: 2022-06-09

## 2022-06-02 RX ORDER — INSULIN ASPART 100 [IU]/ML
15 INJECTION, SOLUTION INTRAVENOUS; SUBCUTANEOUS
Status: DISCONTINUED | OUTPATIENT
Start: 2022-06-02 | End: 2022-06-15

## 2022-06-02 RX ORDER — BISACODYL 10 MG
10 SUPPOSITORY, RECTAL RECTAL DAILY PRN
Status: CANCELLED | OUTPATIENT
Start: 2022-06-02

## 2022-06-02 RX ORDER — AMOXICILLIN 250 MG
2 CAPSULE ORAL 2 TIMES DAILY
Status: CANCELLED | OUTPATIENT
Start: 2022-06-02

## 2022-06-02 RX ORDER — NADOLOL 40 MG/1
20 TABLET ORAL
Status: CANCELLED | OUTPATIENT
Start: 2022-06-03

## 2022-06-02 RX ORDER — POLYETHYLENE GLYCOL 3350 17 G/17G
17 POWDER, FOR SOLUTION ORAL DAILY PRN
Status: CANCELLED | OUTPATIENT
Start: 2022-06-02

## 2022-06-02 RX ORDER — ASPIRIN 81 MG/1
81 TABLET ORAL DAILY
Status: CANCELLED | OUTPATIENT
Start: 2022-06-03

## 2022-06-02 RX ORDER — DEXTROSE MONOHYDRATE 25 G/50ML
25 INJECTION, SOLUTION INTRAVENOUS
Status: DISCONTINUED | OUTPATIENT
Start: 2022-06-02 | End: 2022-06-16 | Stop reason: HOSPADM

## 2022-06-02 RX ORDER — NYSTATIN 100000 [USP'U]/G
POWDER TOPICAL EVERY 12 HOURS SCHEDULED
Status: DISCONTINUED | OUTPATIENT
Start: 2022-06-02 | End: 2022-06-16 | Stop reason: HOSPADM

## 2022-06-02 RX ORDER — BISACODYL 5 MG/1
5 TABLET, DELAYED RELEASE ORAL DAILY PRN
Status: CANCELLED | OUTPATIENT
Start: 2022-06-02

## 2022-06-02 RX ORDER — FUROSEMIDE 40 MG/1
40 TABLET ORAL DAILY
Status: DISCONTINUED | OUTPATIENT
Start: 2022-06-03 | End: 2022-06-03

## 2022-06-02 RX ORDER — NICOTINE POLACRILEX 4 MG
15 LOZENGE BUCCAL
Status: CANCELLED | OUTPATIENT
Start: 2022-06-02

## 2022-06-02 RX ADMIN — DOCUSATE SODIUM 50 MG AND SENNOSIDES 8.6 MG 2 TABLET: 8.6; 5 TABLET, FILM COATED ORAL at 21:23

## 2022-06-02 RX ADMIN — NYSTATIN: 100000 POWDER TOPICAL at 21:23

## 2022-06-02 RX ADMIN — VANCOMYCIN HYDROCHLORIDE 1750 MG: 5 INJECTION, POWDER, LYOPHILIZED, FOR SOLUTION INTRAVENOUS at 21:21

## 2022-06-02 RX ADMIN — FUROSEMIDE 40 MG: 40 TABLET ORAL at 08:09

## 2022-06-02 RX ADMIN — INSULIN ASPART 15 UNITS: 100 INJECTION, SOLUTION INTRAVENOUS; SUBCUTANEOUS at 18:09

## 2022-06-02 RX ADMIN — DOCUSATE SODIUM 50 MG AND SENNOSIDES 8.6 MG 2 TABLET: 8.6; 5 TABLET, FILM COATED ORAL at 08:10

## 2022-06-02 RX ADMIN — Medication 1 TABLET: at 08:09

## 2022-06-02 RX ADMIN — ASPIRIN 81 MG: 81 TABLET, COATED ORAL at 08:09

## 2022-06-02 RX ADMIN — INSULIN ASPART 4 UNITS: 100 INJECTION, SOLUTION INTRAVENOUS; SUBCUTANEOUS at 10:51

## 2022-06-02 RX ADMIN — INSULIN ASPART 15 UNITS: 100 INJECTION, SOLUTION INTRAVENOUS; SUBCUTANEOUS at 10:52

## 2022-06-02 RX ADMIN — Medication 10 ML: at 08:10

## 2022-06-02 RX ADMIN — ENOXAPARIN SODIUM 40 MG: 40 INJECTION SUBCUTANEOUS at 21:23

## 2022-06-02 RX ADMIN — VANCOMYCIN HYDROCHLORIDE 1750 MG: 5 INJECTION, POWDER, LYOPHILIZED, FOR SOLUTION INTRAVENOUS at 08:09

## 2022-06-02 RX ADMIN — INSULIN ASPART 3 UNITS: 100 INJECTION, SOLUTION INTRAVENOUS; SUBCUTANEOUS at 18:08

## 2022-06-02 RX ADMIN — SPIRONOLACTONE 50 MG: 25 TABLET ORAL at 08:09

## 2022-06-02 RX ADMIN — NADOLOL 20 MG: 40 TABLET ORAL at 08:09

## 2022-06-02 RX ADMIN — HYDROCODONE BITARTRATE AND ACETAMINOPHEN 1 TABLET: 7.5; 325 TABLET ORAL at 21:21

## 2022-06-02 RX ADMIN — PANTOPRAZOLE SODIUM 40 MG: 40 TABLET, DELAYED RELEASE ORAL at 05:43

## 2022-06-02 RX ADMIN — Medication 10 ML: at 21:22

## 2022-06-02 RX ADMIN — METFORMIN HYDROCHLORIDE 500 MG: 500 TABLET ORAL at 18:09

## 2022-06-02 RX ADMIN — METFORMIN HYDROCHLORIDE 500 MG: 500 TABLET ORAL at 08:09

## 2022-06-02 RX ADMIN — ATORVASTATIN CALCIUM 40 MG: 40 TABLET, FILM COATED ORAL at 21:23

## 2022-06-02 NOTE — PROGRESS NOTES
Patient Assessment Instrument  Quality Indicators - Admission    Section B. Hearing, Speech Vision  Expression of Ideas and Wants: Expresses complex messages without difficulty and  with speech that is clear and easy to understand.  Understanding Verbal and Non-Verbal Content: Understands: Clear comprehension  without cues or repetitions.    Section C. Cognitive Patterns      Section PG6678. Prior Functioning      Section OY6091. Prior Device Use      Section FB3077. Self Care Performance      Section DO1477. Self Care Discharge Goals      Section ZC7756. Mobility Performance      Section JB4374. Mobility Discharge Goals      Section H. Bladder and Bowel      Section I. Active Diagnosis      Section J. Health Conditions      Section K. Swallowing/Nutritional Status      Section M. Skin Conditions      Section N. Medication      Section O. Special Treatments, Procedures, and Programs      OPTIONAL BRANCH FOR TRACKING FALLS  Fall(s) During Shift:    Signed by: Hellen Mcdonnell RN

## 2022-06-02 NOTE — PROGRESS NOTES
Patient Assessment Instrument  Quality Indicators - Admission    Section B. Hearing, Speech Vision      Section C. Cognitive Patterns      Section JW9590. Prior Functioning      Section VC7182. Prior Device Use  Patient does not use manual or motorized wheelchair or scooter, mechanical lift,  walker, or an orthotic/prosthesis.    Section UY1930. Self Care Performance      Section PZ6537. Self Care Discharge Goals      Section VG3120. Mobility Performance      Section JA7032. Mobility Discharge Goals      Section H. Bladder and Bowel      Section I. Active Diagnosis      Section J. Health Conditions      Section K. Swallowing/Nutritional Status      Section M. Skin Conditions      Section N. Medication      Section O. Special Treatments, Procedures, and Programs      OPTIONAL BRANCH FOR TRACKING FALLS  Fall(s) During Shift:    Signed by: Jaja Ramirez, Supervisor

## 2022-06-02 NOTE — CASE MANAGEMENT/SOCIAL WORK
Discharge Planning Assessment   [place]     Patient Name: Melchor Hardwick  MRN: [id number]  Today's Date: 6/2/2022    Admit Date: 5/10/2022     Discharge Plan     Row Name 06/02/22 1506       Plan    Final Discharge Disposition Code 62 - inpatient rehab facility    Final Note SS received call from inpatient rehab per [facility] stating pre-authorization has been approved and pt can be admitted today with a negative Covid test. SS notified Dr. Quinones. Covid test result is negative. Pt is being discharged to inpatient rehab today. SS notified inpatient rehab per Crystal. SS spoke to pt and he voiced agreement to be discharged to inpatient rehab today. RN to call report to inpatient rehab today. No other needs identified.              Continued Care and Services - Admitted Since 5/10/2022     Destination Coordination complete.    Service Provider Request Status Selected Services Address Phone Fax Patient Preferred     [facility]   [facility] [address] [facility] [place] [address] [phone] --          Durable Medical Equipment     Service Provider Request Status Selected Services Address Phone Fax Patient Preferred    [facility]  Pending - No Request Sent N/A [id number] [place] [address] [phone] --              Expected Discharge Date and Time     Expected Discharge Date Expected Discharge Time    Jun 2, 2022         ARNAV Ortiz

## 2022-06-02 NOTE — PROGRESS NOTES
Case Management  Inpatient Rehabilitation Plan of Care and Discharge Plan Note    Rehabilitation Diagnosis:  Debility due to Left midfoot amputation secondary to  gas gangrene and osteomyelitis.  Date of Onset:  05/10/22    Medical Summary:  PMH:  DM, high cholesterol, HLD, HTN, perineal abscess  drainage    Plan of Care      Expected Intensity:  Average of 3 hours of therapy 5 days/week.  Interdisciplinary Team:  Interdisciplinary Team: Medical Supervision and 24 Hour Rehabilitation Nursing.,  Physical Therapy:, Occupational Therapy:, Social Work, Therapeutic Recreation.  Physical Therapy Intensity/Duration: 1.5 hrs/day, 5-6 days/week  Occupational Therapy Intensity/Duration: 1.5 hrs/day, 5-6 days/week  Estimated Length of Stay/Anticipated Discharge Date: 14 days  Anticipated Discharge Destination:  Anticipated discharge destination from inpatient rehabilitation is community  discharge with assistance. Pt plans to go home with sister Ros at discharge.      Based on the patient's medical and functional status, their prognosis and  expected level of functional improvement is:  Good    Signed by: Jaja Ramirez, Supervisor

## 2022-06-02 NOTE — THERAPY TREATMENT NOTE
Acute Care - Occupational Therapy Treatment Note   [facility]     Patient Name: Melchor Hardwick  : 1965  MRN: [id number]  Today's Date: 2022  Onset of Illness/Injury or Date of Surgery: 05/10/22     Referring Physician: Lasha    Admit Date: 5/10/2022       ICD-10-CM ICD-9-CM   1. Acute hematogenous osteomyelitis of left foot ([facility])  M86.072 730.07   2. Type 2 diabetes mellitus with hyperosmolar coma, with long-term current use of insulin ([facility])  E11.01 250.20    Z79.4 V58.67   3. Gas gangrene of foot ([facility])  A48.0 040.0   4. Class 2 severe obesity due to excess calories with serious comorbidity and body mass index (BMI) of 36.0 to 36.9 in adult ([facility])  E66.01 278.01    Z68.36 V85.36   5. Cellulitis in diabetic foot ([facility])  E11.628 250.80    L03.119 682.7   6. Other acute osteomyelitis of left foot ([facility])  M86.172 730.07     Patient Active Problem List   Diagnosis   • Hyperlipidemia   • Hypertensive disorder   • Kidney disease   • Obesity   • Type 2 diabetes mellitus ([facility])   • Gas gangrene of foot ([facility])   • Cellulitis in diabetic foot ([facility])   • Other acute osteomyelitis of left foot ([facility])   • Osteomyelitis ([facility])     Past Medical History:   Diagnosis Date   • Diabetes mellitus ([facility])    • Elevated cholesterol    • Hyperlipidemia    • Hypertension      Past Surgical History:   Procedure Laterality Date   • ABSCESS DRAINAGE      PERINEUM   • AMPUTATION FOOT Left 2022    Procedure: AMPUTATION FOOT;  Surgeon: Addison Colby MD;  Location: [facility];  Service: Podiatry;  Laterality: Left;   • INCISION AND DRAINAGE LEG Left 05/10/2022    Procedure: INCISION AND DRAINAGE LOWER EXTREMITY;  Surgeon: Addison Colby MD;  Location: [facility] OR;  Service: Podiatry;  Laterality: Left;   • WOUND DEBRIDEMENT Left 2022    Procedure: DEBRIDEMENT FOOT;  Surgeon: Addison Colby MD;  Location: [facility];  Service: Podiatry;  Laterality: Left;         OT ASSESSMENT FLOWSHEET (last 12 hours)     OT Evaluation and Treatment      Row Name 06/02/22 1046                   OT Time and Intention    Subjective Information complains of;weakness;fatigue;swelling  -LM        Document Type therapy note (daily note)  -LM        Mode of Treatment occupational therapy  -LM        Patient Effort good  -LM        Comment Patient seen this date for adl retaining/education and fxl mobility.  Patient required min assist for supine to sit but was unable to continue sitting on eob or attempt to stand due to scrotal swelling/discomfort.  Patient requires setup with grooming and UE dressing, independent with feeding, max/mod assist with bathing, LE dressing, and toileting.  -LM                  General Information    Existing Precautions/Restrictions fall;non-weight bearing  -LM                  Wound 05/16/22 1340 coccyx Pressure Injury    Wound - Properties Group Placement Date: 05/16/22 -SJ Placement Time: 1340 -SJ Location: coccyx  -SJ Primary Wound Type: Pressure inj  -SJ        Retired Wound - Properties Group Placement Date: 05/16/22  -SJ Placement Time: 1340  -SJ Location: coccyx  -SJ Primary Wound Type: Pressure inj  -SJ        Retired Wound - Properties Group Date first assessed: 05/16/22  -SJ Time first assessed: 1340  -SJ Location: coccyx  -SJ Primary Wound Type: Pressure inj  -SJ                  Wound 05/18/22 1545 Left other (see comments) foot Incision    Wound - Properties Group Placement Date: 05/18/22  -CE Placement Time: 1545  -CE Side: Left  -CE Orientation: other (see comments)  -CE, Mid-Left Foot Amputation.  Location: foot  -CE Primary Wound Type: Incision  -CE        Retired Wound - Properties Group Placement Date: 05/18/22  -CE Placement Time: 1545  -CE Side: Left  -CE Orientation: other (see comments)  -CE, Mid-Left Foot Amputation.  Location: foot  -CE Primary Wound Type: Incision  -CE        Retired Wound - Properties Group Date first assessed: 05/18/22  -CE Time first assessed: 1545  -CE Side: Left  -CE Location: foot  -CE Primary  Wound Type: Incision  -CE                  Positioning and Restraints    Post Treatment Position bed  -LM        In Bed call light within reach;encouraged to call for assist;with PT  -LM              User Key  (r) = Recorded By, (t) = Taken By, (c) = Cosigned By    Initials Name Effective Dates    CE Robby Ortega, RN 06/16/21 -     LM Kristine Morrell OT 06/16/21 -     SJ Gisela Carter RN 06/16/21 -                        OT Recommendation and Plan  Planned Therapy Interventions (OT): activity tolerance training, adaptive equipment training, BADL retraining, patient/caregiver education/training, transfer/mobility retraining, strengthening exercise, ROM/therapeutic exercise  Therapy Frequency (OT): other (see comments) (prn and/or to monitor fxl progress)  Plan of Care Review  Plan of Care Reviewed With: patient  Plan of Care Reviewed With: patient        Time Calculation:     Therapy Charges for Today     Code Description Service Date Service Provider Modifiers Qty    [id number]  OT SELF CARE/MGMT/TRAIN EA 15 MIN 6/1/2022 Kristine Morrell, OT GO 2    [id number]  OT SELF CARE/MGMT/TRAIN EA 15 MIN 6/2/2022 Kristine Morrell OT GO 2               Kristine Morrell OT  6/2/2022

## 2022-06-02 NOTE — DISCHARGE SUMMARY
[facility] HOSPITALISTS DISCHARGE SUMMARY    Patient Identification:  Name:  Melchor Hardwick  Age:  57 y.o.  Sex:  male  :  1965  MRN:  [id number]  Visit Number:  [id number]    Date of Admission: 5/10/2022  Date of Discharge:  2022     PCP: Missy Up APRN    DISCHARGE DIAGNOSIS  #Critical illness myopathy  #MRSA bacteremia  #Sepsis due to left foot osteomyelitis  #Status post transmetatarsal amputation  #Left lower extremity osteomyelitis due to MRSA  #Left foot gas gangrene  #Uncontrolled type 2 diabetes mellitus  #VALENCIA cirrhosis  #Portal hypertension  #Anasarca  #Chronic microcytic anemia  #Essential hypertension, controlled   #Hyperlipidemia  #Scrotal edema due to cirrhosis  #Obesity, BMI 36    CONSULTS   Urology  Infectious disease  Podiatry    PROCEDURES PERFORMED   left midfoot amputation   debridement of left foot due to gas gangrene  5/10 status post I&D left lower leg abscess    HOSPITAL COURSE  Patient is a 57 y.o. male presented to [facility] complaining left foot pain after trauma while working.  Please see the admitting history and physical for further details.  He initially presented with cellulitis and swelling and noted to have gas gangrene.  He underwent an I&D for abscess and debridement by podiatry.  Wound infection was positive for MRSA and blood cultures were positive for MRSA as well.  Blood cultures quickly cleared on .  Infectious disease was consulted and followed throughout the hospital course.  He ultimately required transmetatarsal amputation by podiatry and was continued on IV vancomycin to be continued through  via PICC line placed on .    His hospital course was complicated by newly diagnosed Valencia cirrhosis and anasarca.  He had severe scrotal edema with difficulty urinating resulting in urology consultation and placement of Ortiz catheter.  He was placed on p.o. Lasix/Aldactone 40/100 combo with nadolol for volume control  and portal hypertension and has done well with this throughout the hospital course.  Heart rate has been in the low 60s therefore nadolol was continued at lowest dose.  His lower extremity edema and scrotal edema have all improved over the last few days and started to do voiding trials on 6/1 however he did not feel the urge to void therefore Ortiz was left.  We have been performing daily voiding trials with plans to remove Ortiz catheter when possible.    In regards to type 2 diabetes mellitus, did decrease his basal from 50 units at bedtime to 45 units, continued mealtime 15 units 3 times daily AC.  He is not on ACE or ARB due to comorbid cirrhosis.    Patient had a prolonged hospital course of 23 days resulting in critical illness myopathy requiring aggressive PT/OT, especially in the setting of transmetatarsal amputation.  He will need outpatient referral to GI for esophageal varices screening given new diagnosis of cirrhosis.  He was hemodynamically stable at the time of discharge to inpatient rehab.      VITAL SIGNS:  Temp:  [97.9 °F (36.6 °C)-98.2 °F (36.8 °C)] 98.1 °F (36.7 °C)  Heart Rate:  [64-67] 64  Resp:  [18] 18  BP: (111-131)/(56-62) 118/56  SpO2:  [93 %-96 %] 96 %  on   ;   Device (Oxygen Therapy): room air    Body mass index is 37.02 kg/m².  Wt Readings from Last 3 Encounters:   06/02/22 117 kg (258 lb)       PHYSICAL EXAM:  Constitutional: Middle-age male, appears older than stated age,, Well-developed and well-nourished, resting comfortably in bed, no acute distress.      HENT:  Head:  Normocephalic and atraumatic.  Mouth:  Moist mucous membranes.    Eyes:  Conjunctivae and EOM are normal. No scleral icterus.   Cardiovascular:  Normal rate, regular rhythm and normal heart sounds with no murmur. No JVD.   Pulmonary/Chest:  No respiratory distress, no wheezes, mild bibasilar crackles, with normal breath sounds and good air movement. Unlabored. No accessory muscle use.  Abdominal:  Soft. No  distension and no tenderness.  Bowel sounds present. No rebound or guarding.   Musculoskeletal:  No tenderness, left lower extremity wrapped in Ace bandage, no red or swollen joints anywhere. [facility]  Neurological:  Alert and oriented to person, place, and time.  No cranial nerve deficit.   Nonfocal.   Skin:  Skin is warm and dry. No rash noted. No pallor.   Peripheral vascular:  No clubbing, no cyanosis, no edema. Pedal and tibial pulses 2/4 bilaterally.   : Improved scrotal edema, Ortiz    DISCHARGE DISPOSITION   Stable    DISCHARGE MEDICATIONS:    Inter-facility transfer medications (From admission, onward)             sodium chloride 0.9 % flush 10 mL  (Insert Peripheral IV)  As Needed             multivitamin (THERAGRAN) tablet 1 tablet  Daily             sodium chloride 0.9 % flush 10 mL  (Insert and Maintain IV)  Every 12 Hours Scheduled             sodium chloride 0.9 % flush 10 mL  (Insert and Maintain IV)  As Needed             pantoprazole (PROTONIX) EC tablet 40 mg  Every Early Morning             sodium chloride 0.9 % flush 10 mL  (Midline Care / Maintenance - 1 Lumen)  Every 12 Hours Scheduled             sodium chloride 0.9 % flush 10 mL  (Midline Care / Maintenance - 1 Lumen)  As Needed             magic barrier cream 1 application  As Needed             melatonin tablet 10 mg  Nightly PRN             aspirin EC tablet 81 mg  Daily             insulin detemir (LEVEMIR) injection 45 Units  Daily             vancomycin 1750 mg/500 mL 0.9% NS IVPB ([facility])  Every 12 Hours             dextrose (GLUTOSE) oral gel 15 g  Every 15 Minutes PRN             dextrose (D50W) (25 g/50 mL) IV injection 25 g  Every 15 Minutes PRN             glucagon (human recombinant) (GLUCAGEN DIAGNOSTIC) injection 1 mg  Every 15 Minutes PRN             Insulin Aspart (novoLOG) injection 0-7 Units  (Correction Insulin - Low Dose (Total Insulin Dose Less Than 40 units/day, Lean Patients, Elderly Patients or Renal Patients))  3  "Times Daily Before Meals             HYDROcodone-acetaminophen (NORCO) 7.5-325 MG per tablet 1 tablet  Every 6 Hours PRN             Enoxaparin Sodium (LOVENOX) syringe 40 mg  Nightly             furosemide (LASIX) tablet 40 mg  Daily             sennosides-docusate (PERICOLACE) 8.6-50 MG per tablet 2 tablet  (Bowel Management Regimen)  2 Times Daily        \"And\" Linked Group Details        polyethylene glycol (MIRALAX) packet 17 g  (Bowel Management Regimen)  Daily PRN        \"And\" Linked Group Details        bisacodyl (DULCOLAX) EC tablet 5 mg  (Bowel Management Regimen)  Daily PRN        \"And\" Linked Group Details        bisacodyl (DULCOLAX) suppository 10 mg  (Bowel Management Regimen)  Daily PRN        \"And\" Linked Group Details        nadolol (CORGARD) tablet 20 mg  Every 24 Hours Scheduled             Insulin Aspart (novoLOG) injection 15 Units  3 Times Daily Before Meals             metFORMIN (GLUCOPHAGE) tablet 500 mg  2 Times Daily With Meals             atorvastatin (LIPITOR) tablet 40 mg  Nightly             spironolactone (ALDACTONE) tablet 50 mg  Daily                         Follow-up Information     Missy Up, APRN .    Specialties: Nurse Practitioner, Family Medicine  Contact information:  [address] [place], [place] [id number] [phone]                          TEST  RESULTS PENDING AT DISCHARGE  Pending Labs     Order Current Status    Alpha - 1 - Antitrypsin Deficiency In process           CODE STATUS  Code Status and Medical Interventions:   Ordered at: 05/10/22 8423     Level Of Support Discussed With:    Patient     Code Status (Patient has no pulse and is not breathing):    CPR (Attempt to Resuscitate)     Medical Interventions (Patient has pulse or is breathing):    Full Support       The ASCVD Risk score (Keller KAYKAY Jr., et al., 2013) failed to calculate for the following reasons:    The valid total cholesterol range is 130 to 320 mg/dL     Robby Quinones [facility] " Hospitalist  06/02/22  13:43 EDT    Please note that this discharge summary required more than 30 minutes to complete.

## 2022-06-02 NOTE — PROGRESS NOTES
PROGRESS NOTE         Patient Identification:  Name:  Melchor Hardwick  Age:  57 y.o.  Sex:  male  :  1965  MRN:  [id number]  Visit Number:  [id number]  Primary Care Provider:  Missy Up APRN         LOS: 23 days       ----------------------------------------------------------------------------------------------------------------------  Subjective       Chief Complaints:    Foot Pain        Interval History:      The patient sitting up in bed on room air in no apparent distress.  Denies any complaints at this time and reports that edema has overall significantly improved. Afebrile, no diarrhea.  No new labs this morning.  Awaiting rehab placement.    Review of Systems:      Constitutional: no fever, chills and night sweats. No appetite change or unexpected weight change. No fatigue.  Eyes: no eye drainage, itching or redness.  HEENT: no mouth sores, dysphagia or nose bleed.  Respiratory: no for shortness of breath, cough or production of sputum.  Cardiovascular: no chest pain, no palpitations, no orthopnea.  Gastrointestinal: no nausea, vomiting or diarrhea. No abdominal pain, hematemesis or rectal bleeding.  Genitourinary: no dysuria or polyuria. Scrotal edema, improving.  Hematologic/lymphatic: no lymph node abnormalities, no easy bruising or easy bleeding.  Musculoskeletal: Left lower extremity pain status post midfoot amputation, improving.  Skin: No rash and no itching.  Neurological: no loss of consciousness, no seizure, no headache.  Behavioral/Psych: no depression or suicidal ideation.  Endocrine: no hot flashes.  Immunologic: negative.    ----------------------------------------------------------------------------------------------------------------------      Objective       [facility] Meds:  aspirin, 81 mg, Oral, Daily  atorvastatin, 40 mg, Oral, Nightly  enoxaparin, 40 mg, Subcutaneous, Nightly  furosemide, 40 mg, Oral, Daily  Insulin Aspart, 0-7 Units, Subcutaneous, TID  AC  Insulin Aspart, 15 Units, Subcutaneous, TID AC  insulin detemir, 50 Units, Subcutaneous, Daily  metFORMIN, 500 mg, Oral, BID With Meals  multivitamin, 1 tablet, Oral, Daily  nadolol, 20 mg, Oral, Q24H  pantoprazole, 40 mg, Oral, Q AM  senna-docusate sodium, 2 tablet, Oral, BID  sodium chloride, 10 mL, Intravenous, Q12H  sodium chloride, 10 mL, Intravenous, Q12H  spironolactone, 50 mg, Oral, Daily  vancomycin, 1,750 mg, Intravenous, Q12H         ----------------------------------------------------------------------------------------------------------------------    Vital Signs:  Temp:  [97.9 °F (36.6 °C)-98.2 °F (36.8 °C)] 98.1 °F (36.7 °C)  Heart Rate:  [64-67] 64  Resp:  [18] 18  BP: (111-131)/(56-62) 118/56  No data found.  SpO2 Percentage    06/01/22 1400 06/01/22 1900 06/02/22 0300   SpO2: 93% 96% 96%     SpO2:  [93 %-96 %] 96 %  on   ;   Device (Oxygen Therapy): room air    Body mass index is 37.02 kg/m².  Wt Readings from Last 3 Encounters:   06/02/22 117 kg (258 lb)        Intake/Output Summary (Last 24 hours) at 6/2/2022 0946  Last data filed at 6/2/2022 0300  Gross per 24 hour   Intake 1400 ml   Output 1850 ml   Net -450 ml     Diet Regular; Cardiac, Consistent Carbohydrate, Daily Fluid Restriction; 1500 mL Fluid Per Day; High Protein  ----------------------------------------------------------------------------------------------------------------------      Physical Exam:       Constitutional: Obese  male is sitting up in bed on room air in no apparent distress.   HENT:  Head: Normocephalic and atraumatic.  Mouth:  Moist mucous membranes.    Eyes:  Conjunctivae and EOM are normal.  No scleral icterus.  Neck:  Neck supple.  No JVD present.    Cardiovascular:  Normal rate, regular rhythm and normal heart sounds with no murmur. No edema.  Pulmonary/Chest:  No respiratory distress, no wheezes, no crackles, with normal breath sounds and good air movement.  Abdominal:  Soft.  Bowel sounds are normal.   No tenderness.  Abdominal distention.  Musculoskeletal:  No edema, no tenderness, and no deformity.  No swelling or redness of joints.  Left foot in bulky surgical dressing this morning.  Drain has now been removed.  1+ edema of left lower extremity.  2+ edema of right lower extremity.  Neurological:  Alert and oriented to person, place, and time.  No facial droop.  No slurred speech.   Skin:  Skin is warm and dry.  No rash noted.  No pallor.   Psychiatric:  Normal mood and affect.  Behavior is normal.    ----------------------------------------------------------------------------------------------------------------------  Results from last 7 days   Lab Units 05/30/22 0628   CK TOTAL U/L 36             Results from last 7 days   Lab Units 05/30/22  0628 05/28/22  0520 05/27/22  0159   CRP mg/dL  --   --  5.05*   WBC 10*3/mm3 3.63 3.59 4.70   HEMOGLOBIN g/dL 9.1* 9.1* 9.2*   HEMATOCRIT % 28.1* 29.1* 28.5*   MCV fL 78.5* 79.1 78.3*   MCHC g/dL 32.4 31.3* 32.3   PLATELETS 10*3/mm3 120* 127* 136*   INR  1.04  --   --      Results from last 7 days   Lab Units 06/01/22  0452 05/31/22  1140 05/30/22  0628 05/29/22  0729 05/28/22  0520   SODIUM mmol/L 134* 133* 136 137 134*   POTASSIUM mmol/L 3.7 3.9 4.1 4.0 4.0   MAGNESIUM mg/dL  --  1.9  --  2.0 1.9   CHLORIDE mmol/L 103 103 103 103 101   CO2 mmol/L 24.3 23.7 24.7 27.0 25.7   BUN mg/dL 19 20 15 16 19   CREATININE mg/dL 0.70* 0.78 0.65* 0.64* 0.62*   CALCIUM mg/dL 7.8* 7.7* 7.8* 7.9* 7.8*   GLUCOSE mg/dL 174* 232* 197* 116* 177*   ALBUMIN g/dL  --   --  1.92*  --   --    BILIRUBIN mg/dL  --   --  0.3  --   --    ALK PHOS U/L  --   --  337*  --   --    AST (SGOT) U/L  --   --  67*  --   --    ALT (SGPT) U/L  --   --  43*  --   --    Estimated Creatinine Clearance: 149.2 mL/min (A) (by C-G formula based on SCr of 0.7 mg/dL (L)).  No results found for: AMMONIA    Glucose   Date/Time Value Ref Range Status   06/02/2022 0636 98 70 - 130 mg/dL Final     Comment:     Meter:  [id number] : [id number] VITOR ENNIS   06/02/2022 0545 104 70 - 130 mg/dL Final     Comment:     Meter: [id number] : [id number] SHAD MANUEL   06/01/2022 1643 91 70 - 130 mg/dL Final     Comment:     Meter: [id number] : [id number] DEMOND RODRIGUEZ   06/01/2022 1044 279 (H) 70 - 130 mg/dL Final     Comment:     Meter: [id number] : [id number] hector brown   06/01/2022 0625 203 (H) 70 - 130 mg/dL Final     Comment:     Meter: [id number] : [id number] VITOR LOLI   05/31/2022 1614 242 (H) 70 - 130 mg/dL Final     Comment:     Meter: [id number] : [id number] Edna Bell L   05/31/2022 1014 254 (H) 70 - 130 mg/dL Final     Comment:     Meter: [id number] : [id number] Edna Bell L   05/31/2022 0622 175 (H) 70 - 130 mg/dL Final     Comment:     Meter: [id number] : [id number] VITOR LOLI     Lab Results   Component Value Date    HGBA1C 13.00 (H) 05/10/2022     Lab Results   Component Value Date    TSH 4.700 (H) 05/30/2022    FREET4 0.87 (L) 05/30/2022       Blood Culture   Date Value Ref Range Status   05/10/2022 Staphylococcus aureus, MRSA (C)  Preliminary     Comment:     Infectious disease consultation is highly recommended to rule out distant foci of infection.  Methicillin resistant Staphylococcus aureus, Patient may be an isolation risk.   05/10/2022 Staphylococcus aureus, MRSA (C)  Preliminary     Comment:     Infectious disease consultation is highly recommended to rule out distant foci of infection.  Methicillin resistant Staphylococcus aureus, Patient may be an isolation risk.     No results found for: URINECX  Wound Culture   Date Value Ref Range Status   05/10/2022 Scant growth (1+) Staphylococcus aureus, MRSA (A)  Preliminary     Comment:     Methicillin resistant Staphylococcus aureus, Patient may be an isolation risk.     No results found for: STOOLCX  No results found for: RESPCX  Pain Management Panel       Pain Management Panel Latest Ref Rng & Units 5/24/2022 5/11/2022     CREATININE UR mg/dL 91.0 -    AMPHETAMINES SCREEN, URINE Negative - Negative    BARBITURATES SCREEN Negative - Negative    BENZODIAZEPINE SCREEN, URINE Negative - Negative    BUPRENORPHINEUR Negative - Negative    COCAINE SCREEN, URINE Negative - Negative    METHADONE SCREEN, URINE Negative - Negative    METHAMPHETAMINEUR Negative - Negative              ----------------------------------------------------------------------------------------------------------------------  Imaging Results (Last 24 Hours)       ** No results found for the last 24 hours. **            ----------------------------------------------------------------------------------------------------------------------    Assessment/Plan       ASSESSMENT:    1.  Severe sepsis with lactic acid greater than 2 on admission  2.  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  Denies any complaints at this time and reports that edema has overall significantly improved. Afebrile, no diarrhea.  No new labs this morning.  Awaiting rehab placement.    Status post excisional debridement of necrotic tissue/tendon level dorsum of the foot with evacuation of purulence necrosis from 5/13/22.  Status post left midfoot amputation on 5/18/2022.  Repeat wound cultures from 5/13/2022 report growth of MRSA and yeast not Candida albicans. Tissue culture of the left foot on 5/18/2022 finalized as MRSA.  Wound culture of the left foot on 5/18/2022 finalized as MRSA.    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  X-rays at an urgent care were taken and he was informed he had hematoma.  After that time, patient noticed an ulcer had developed with progressive worsening. Dr. Colby performed I&D on 5/10/2022.  Lactic acid on admission was elevated at 3.0.  Urinalysis on 5/11/2022 was positive with 13-20 WBCs but no culture was taken.  CT of the left  lower extremity on 5/10/2022 showed marked soft tissue edema and fat stranding at the level of the foot, diffuse with scattered foci of air both within soft tissues and bony structures.  Findings are concerning for gas gangrene and osteomyelitis.  Both the midfoot and forefoot are involved.  X-ray of the left foot on 5/10/2022 showed degenerative change at the midfoot.  COVID-19 and flu A/B PCR on 5/10/2022 was negative.  Wound culture of the left foot on 5/10/2022 is so far showing gram-positive cocci on gram stain.  Blood cultures on 5/10/2022 2 out of 2 sets positive for MRSA. Blood cultures from 5/12/2022 finalized as no growth. Wound culture from 5/10/2022 finalized as MRSA.  2D echo from 5/12/2022 reports no evidence of vegetation. MRI of the left foot from 5/16/2022 reports appearance concerning for osteomyelitis involving the third metatarsal and likely adjacent cuneiform.  HIV-1/HIV-2 on 5/20/2022 was nonreactive.  Scrotum and testicles ultrasound on 5/20/2022 showed extensive scrotal wall swelling.  Abdominal ultrasound on 5/20/2022 showed splenomegaly and small volume ascites. Bilateral lower extremity venous duplex on 5/24/2022 showed no DVT.     Recommend to continue vancomycin pharmacy to dose through 6/23/2022 for treatment of osteomyelitis. Case discussed with Dr. Colby, who agrees with need for prolonged IV antibiotic therapy. We will follow closely and adjust antibiotic therapy as appropriate.    Code Status:   Code Status and Medical Interventions:   Ordered at: 05/10/22 2314     Level Of Support Discussed With:    Patient     Code Status (Patient has no pulse and is not breathing):    CPR (Attempt to Resuscitate)     Medical Interventions (Patient has pulse or is breathing):    Full Support     DREA Chávez  06/02/22  09:46 EDT

## 2022-06-02 NOTE — THERAPY TREATMENT NOTE
Acute Care - Physical Therapy Treatment Note   [place]     Patient Name: Melchor Hardwick  : 1965  MRN: [id number]  Today's Date: 2022   Onset of Illness/Injury or Date of Surgery: 06/10/22  Visit Dx:     ICD-10-CM ICD-9-CM   1. Acute hematogenous osteomyelitis of left foot ([facility])  M86.072 730.07   2. Type 2 diabetes mellitus with hyperosmolar coma, with long-term current use of insulin ([facility])  E11.01 250.20    Z79.4 V58.67   3. Gas gangrene of foot ([facility])  A48.0 040.0   4. Class 2 severe obesity due to excess calories with serious comorbidity and body mass index (BMI) of 36.0 to 36.9 in adult ([facility])  E66.01 278.01    Z68.36 V85.36   5. Cellulitis in diabetic foot ([facility])  E11.628 250.80    L03.119 682.7   6. Other acute osteomyelitis of left foot ([facility])  M86.172 730.07     Patient Active Problem List   Diagnosis   • Hyperlipidemia   • Hypertensive disorder   • Kidney disease   • Obesity   • Type 2 diabetes mellitus ([facility])   • Gas gangrene of foot ([facility])   • Cellulitis in diabetic foot ([facility])   • Other acute osteomyelitis of left foot ([facility])   • Osteomyelitis ([facility])     Past Medical History:   Diagnosis Date   • Diabetes mellitus ([facility])    • Elevated cholesterol    • Hyperlipidemia    • Hypertension      Past Surgical History:   Procedure Laterality Date   • ABSCESS DRAINAGE      PERINEUM   • AMPUTATION FOOT Left 2022    Procedure: AMPUTATION FOOT;  Surgeon: Addison Colby MD;  Location: [facility];  Service: Podiatry;  Laterality: Left;   • INCISION AND DRAINAGE LEG Left 05/10/2022    Procedure: INCISION AND DRAINAGE LOWER EXTREMITY;  Surgeon: Addison Colby MD;  Location: [facility];  Service: Podiatry;  Laterality: Left;   • WOUND DEBRIDEMENT Left 2022    Procedure: DEBRIDEMENT FOOT;  Surgeon: Addison Colby MD;  Location: [facility];  Service: Podiatry;  Laterality: Left;     PT Assessment (last 12 hours)     PT Evaluation and Treatment     Row Name 22 1300          Physical Therapy Time and  Intention    Subjective Information complains of;pain;numbness  -AG     Document Type therapy note (daily note)  -AG     Mode of Treatment individual therapy;physical therapy  -AG     Patient Effort good  -AG     Comment pt. seen today for bed mobility, attempted transfers and gait training, however continues to be limited by scrotal edema/ discomfort.  -AG     Row Name 06/02/22 1300          General Information    Patient Profile Reviewed yes  -AG     Onset of Illness/Injury or Date of Surgery 06/10/22  -AG     Patient Observations alert;cooperative;agree to therapy  -AG     Patient/Family/Caregiver Comments/Observations pt. supine; still with c/o scrotal edema and discomfort.  -AG     Existing Precautions/Restrictions fall;non-weight bearing  -AG     Risks Reviewed patient:;LOB;increased discomfort  -AG     Benefits Reviewed patient:;improve function;increase independence;increase strength;increase balance;increase knowledge;decrease risk of DVT  -AG     Barriers to Rehab medically complex  -AG     Row Name 06/02/22 1300          Cognition    Affect/Mental Status (Cognition) WFL  -AG     Orientation Status (Cognition) oriented x 3  -AG     Follows Commands (Cognition) WFL  -AG     Personal Safety Interventions fall prevention program maintained;gait belt;nonskid shoes/slippers when out of bed;supervised activity  -AG     Row Name 06/02/22 1300          Mobility    Extremity Weight-bearing Status left lower extremity;right lower extremity  -AG     Left Lower Extremity (Weight-bearing Status) non weight-bearing (NWB)  -AG     Row Name 06/02/22 1300          Bed Mobility    Bed Mobility scooting/bridging;supine-sit;sit-supine  -AG     Scooting/Bridging [place] (Bed Mobility) verbal cues;nonverbal cues (demo/gesture);minimum assist (75% patient effort)  -AG     Supine-Sit [place] (Bed Mobility) nonverbal cues (demo/gesture);verbal cues;minimum assist (75% patient effort)  -AG     Sit-Supine [place] (Bed  Mobility) verbal cues;nonverbal cues (demo/gesture);minimum assist (75% patient effort)  -     Bed Mobility, Safety Issues decreased use of legs for bridging/pushing  -     Assistive Device (Bed Mobility) bed rails  -     Row Name 06/02/22 1300          Transfers    Comment, (Transfers) unable to attempt standing  -     Row Name 06/02/22 1300          Safety Issues, Functional Mobility    Impairments Affecting Function (Mobility) balance;pain;strength;range of motion (ROM)  -     Row Name 06/02/22 1300          Balance    Balance Assessment sitting static balance;sitting dynamic balance;sit to stand dynamic balance;standing static balance;standing dynamic balance  -     Static Sitting Balance standby assist  -     Position, Sitting Balance sitting edge of bed  -     Row Name 06/02/22 1300          Motor Skills    Therapeutic Exercise knee;ankle;hip  -     Row Name 06/02/22 1300          Hip (Therapeutic Exercise)    Hip AROM (Therapeutic Exercise) bilateral;flexion;extension;sitting  -     Row Name 06/02/22 1300          Knee (Therapeutic Exercise)    Knee Strengthening (Therapeutic Exercise) bilateral;flexion;extension;marching while seated;LAQ (long arc quad);hamstring curls;sitting  -     Row Name             Wound 05/16/22 1340 coccyx Pressure Injury    Wound - Properties Group Placement Date: 05/16/22  -SJ Placement Time: 1340 -SJ Location: coccyx  -SJ Primary Wound Type: Pressure inj  -SJ     Retired Wound - Properties Group Placement Date: 05/16/22  -SJ Placement Time: 1340  -SJ Location: coccyx  -SJ Primary Wound Type: Pressure inj  -SJ     Retired Wound - Properties Group Date first assessed: 05/16/22  -SJ Time first assessed: 1340 -SJ Location: coccyx  -SJ Primary Wound Type: Pressure inj  -SJ     Row Name             Wound 05/18/22 1545 Left other (see comments) foot Incision    Wound - Properties Group Placement Date: 05/18/22  -CE Placement Time: 1545  -CE Side: Left  -CE  Orientation: other (see comments)  -CE, Mid-Left Foot Amputation.  Location: foot  -CE Primary Wound Type: Incision  -CE     Retired Wound - Properties Group Placement Date: 05/18/22 -CE Placement Time: 1545 -CE Side: Left  -CE Orientation: other (see comments)  -CE, Mid-Left Foot Amputation.  Location: foot  -CE Primary Wound Type: Incision  -CE     Retired Wound - Properties Group Date first assessed: 05/18/22 -CE Time first assessed: 1545 -CE Side: Left  -CE Location: foot  -CE Primary Wound Type: Incision  -CE     Row Name 06/02/22 1300          Coping    Observed Emotional State calm;cooperative  -AG     Verbalized Emotional State acceptance  -AG     Trust Relationship/Rapport care explained;choices provided;thoughts/feelings acknowledged  -AG     Family/Support Persons family  -AG     Involvement in Care not present at bedside  -AG     Row Name 06/02/22 1300          Plan of Care Review    Plan of Care Reviewed With patient  -AG     Progress no change  -AG     Outcome Evaluation pt. is unable to make progress with functional mobility d/t scrotal edema/ discomfort.  -AG     Row Name 06/02/22 1300          Positioning and Restraints    Pre-Treatment Position in bed  -AG     Post Treatment Position bed  -AG     In Bed supine;encouraged to call for assist;call light within reach;side rails up x3  -AG     Row Name 06/02/22 1300          Therapy Plan Review/Discharge Plan (PT)    Therapy Plan Review (PT) evaluation/treatment results reviewed;care plan/treatment goals reviewed;risks/benefits reviewed;current/potential barriers reviewed;participants voiced agreement with care plan;participants included;patient  -AG           User Key  (r) = Recorded By, (t) = Taken By, (c) = Cosigned By    Initials Name Provider Type    AG Joseline Porter, PT Physical Therapist    CE Robby Ortega, RN Registered Nurse    Gisela Fountain RN Registered Nurse                Physical Therapy Education                 Title: PT  OT SLP Therapies (Done)     Topic: Physical Therapy (Done)     Point: Mobility training (Done)     Learning Progress Summary           Patient Acceptance, E,D, VU,NR by  at 6/2/2022 1328      Show all documentation for this point (10)                 Point: Home exercise program (Done)     Learning Progress Summary           Patient Acceptance, E,D, VU,NR by  at 6/2/2022 1328      Show all documentation for this point (10)                 Point: Body mechanics (Done)     Learning Progress Summary           Patient Acceptance, E,D, VU,NR by  at 6/2/2022 1328      Show all documentation for this point (10)                 Point: Precautions (Done)     Learning Progress Summary           Patient Acceptance, E,D, VU,NR by  at 6/2/2022 1328      Show all documentation for this point (10)                             User Key     Initials Effective Dates Name Provider Type Discipline     06/16/21 -  Joseline Porter, PT Physical Therapist PT              PT Recommendation and Plan  Anticipated Discharge Disposition (PT):  (TBD; it is felt that if scrotal edema resolved, functional mobility would significantly improve.)  Progress Summary (PT)  Progress Toward Functional Goals (PT): progress toward functional goals is gradual  Barriers to Overall Progress (PT): scrotal edema/ discomfort limits all functional mobility  Plan of Care Reviewed With: patient  Progress: no change  Outcome Evaluation: pt. is unable to make progress with functional mobility d/t scrotal edema/ discomfort.       Time Calculation:    PT Charges     Row Name 06/02/22 1328             Time Calculation    PT Received On 06/02/22  -            User Key  (r) = Recorded By, (t) = Taken By, (c) = Cosigned By    Initials Name Provider Type     Joseline Porter, PT Physical Therapist              Therapy Charges for Today     Code Description Service Date Service Provider Modifiers Qty    [id number]  PT THER PROC EA 15 MIN 6/2/2022 Joseline Porter,  PT GP 1    [id number]  PT THERAPEUTIC ACT EA 15 MIN 6/2/2022 Joseline Porter, PT GP 1               Joseline Porter, PT  6/2/2022

## 2022-06-02 NOTE — SIGNIFICANT NOTE
06/02/22 1708   Living Environment   People in Home alone   Current Living Arrangements home   Primary Care Provided by self   Provides Primary Care For no one   Family Caregiver if Needed sibling(s)   Family Caregiver Names Ros White   Quality of Family Relationships helpful;supportive   Able to Return to Prior Arrangements no   Living Arrangement Comments Spoke to pt who states being single and living alone in a 2 level house with a basement.  He has no children.  He has 3 sisters.  His main support is sister Ros White whom he will be going home with at discharge.  She works for [facility] [employer] from home.  He has not had HH or OP services in the past.  He does not have any DME of his own, but was using his grandmother's old walker that doesn't fit him 1 week prior to admission.  He has temporary health insurance from [employer] [place] from 6/1-6/30.  He will need to reapply on 6/30 by calling [phone] or [phone] or by going online to [employer].  His [facility] insurance has prescription benefit.  He uses [facility] in [place].  He does not have an advanced directive, living will, or POA.  Prior to hospitalization, he was independent for ADLs, mobilty, driving, and shopping.  His sister Ros will provide transportation home at discharge.  He plans to go home with sister Ros at discharge.  SS will continue to follow for d/c planning needs.   Transition Planning   Patient/Family Anticipates Transition to home with family   Patient/Family Anticipated Services at Transition durable medical equipment;home health care   Transportation Anticipated family or friend will provide   Discharge Needs Assessment (IRF)   Concerns to be Addressed discharge planning;financial/insurance   Concerns Comments Unable to work due to medical status.  Will need SS to help with disabilty benefits if applicable.   Equipment Currently Used at Home none   Discharge Coordination/Progress Pt plans to go home  with sister Ros Christopher at discharge.

## 2022-06-02 NOTE — PROGRESS NOTES
[facility] HOSPITALIST PROGRESS NOTE     Patient Identification:  Name:  Melchor Hardwick  Age:  57 y.o.  Sex:  male  :  1965  MRN:  [id number]  Visit Number:  [id number]  ROOM: [address] [place] [address]     Primary Care Provider:  Missy Up APRN    Length of stay in inpatient status:  23    Subjective     Chief Compliant:    Chief Complaint   Patient presents with   • Foot Pain       History of Presenting Illness:    Patient seen in follow-up for left lower extremity osteomyelitis.  He notes pain has been well controlled on current pain regimen.  He denies any acute complaints aside from overall weakness and scrotal edema which is decreased over the last several days with diuretic therapy.  Afebrile overnight.  No adverse events noted overnight.  Medically stable, awaiting placement.    Objective     Current Hospital Meds:aspirin, 81 mg, Oral, Daily  atorvastatin, 40 mg, Oral, Nightly  enoxaparin, 40 mg, Subcutaneous, Nightly  furosemide, 40 mg, Oral, Daily  Insulin Aspart, 0-7 Units, Subcutaneous, TID AC  Insulin Aspart, 15 Units, Subcutaneous, TID AC  insulin detemir, 50 Units, Subcutaneous, Daily  metFORMIN, 500 mg, Oral, BID With Meals  multivitamin, 1 tablet, Oral, Daily  nadolol, 20 mg, Oral, Q24H  pantoprazole, 40 mg, Oral, Q AM  senna-docusate sodium, 2 tablet, Oral, BID  sodium chloride, 10 mL, Intravenous, Q12H  sodium chloride, 10 mL, Intravenous, Q12H  spironolactone, 50 mg, Oral, Daily  vancomycin, 1,750 mg, Intravenous, Q12H         Current Antimicrobial Therapy:  Anti-Infectives (From admission, onward)    Ordered     Dose/Rate Route Frequency Start Stop    22 1014  vancomycin 1750 mg/500 mL 0.9% NS IVPB ([facility])        Ordering Provider: Robby Quinones DO    1,750 mg  over 120 Minutes Intravenous Every 12 Hours 22 2100 22 1136  clindamycin (CLEOCIN) 900 mg in dextrose 5% 50 mL IVPB (premix)        Ordering Provider: Marilynn Giordano MD    900  mg  50 mL/hr over 60 Minutes Intravenous Every 8 Hours 05/11/22 1400 05/13/22 0616    05/11/22 1136  cefepime (MAXIPIME) 2 g/100 mL 0.9% NS (mbp)        Ordering Provider: Marilynn Giordano MD    2 g  200 mL/hr over 30 Minutes Intravenous Once 05/11/22 1200 05/11/22 1338    05/10/22 1826  vancomycin 2000 mg/500 mL 0.9% NS IVPB ([facility])        Ordering Provider: Joan Zarate PA-C    20 mg/kg × 103 kg  over 120 Minutes Intravenous Once 05/10/22 1828 05/10/22 2130    05/10/22 1826  piperacillin-tazobactam (ZOSYN) IVPB 4.5 g in 100 mL NS VTB        Ordering Provider: Joan Zarate PA-C    4.5 g  over 30 Minutes Intravenous Once 05/10/22 1828 05/10/22 1855        Current Diuretic Therapy:  Diuretics (From admission, onward)    Ordered     Dose/Rate Route Frequency Start Stop    06/01/22 1017  spironolactone (ALDACTONE) tablet 50 mg        Ordering Provider: Robby Quinones DO    50 mg Oral Daily 06/01/22 1200      05/30/22 0816  furosemide (LASIX) tablet 40 mg        Ordering Provider: Robby Quinones DO    40 mg Oral Daily 05/30/22 0915      05/27/22 1446  furosemide (LASIX) injection 20 mg        Ordering Provider: Ja Bowers MD    20 mg Intravenous Once 05/27/22 1545 05/27/22 1452    05/26/22 1459  furosemide (LASIX) injection 20 mg        Ordering Provider: Ja Bowers MD    20 mg Intravenous Every 12 Hours 05/26/22 1800 05/27/22 0809    05/25/22 1702  furosemide (LASIX) injection 20 mg        Ordering Provider: Ja Bowers MD    20 mg Intravenous Every 12 Hours 05/25/22 1800 05/26/22 0530    05/24/22 1802  furosemide (LASIX) injection 20 mg        Ordering Provider: Ja Bowers MD    20 mg Intravenous Every 12 Hours 05/24/22 1900 05/25/22 0615    05/23/22 1342  furosemide (LASIX) injection 20 mg        Ordering Provider: Ja Bowers MD    20 mg Intravenous Once 05/23/22 1400 05/23/22 1421    05/22/22 1129  furosemide (LASIX) injection 40 mg        Ordering  Provider: James Montiel MD    40 mg Intravenous Once 05/22/22 1215 05/22/22 1302    05/21/22 1616  furosemide (LASIX) injection 40 mg        Ordering Provider: James Montiel MD    40 mg Intravenous Once 05/21/22 1715 05/21/22 1706    05/20/22 1507  furosemide (LASIX) injection 40 mg        Ordering Provider: James Montiel MD    40 mg Intravenous Once 05/20/22 1600 05/20/22 1543        ----------------------------------------------------------------------------------------------------------------------  Vital Signs:  Temp:  [97.9 °F (36.6 °C)-98.2 °F (36.8 °C)] 98.1 °F (36.7 °C)  Heart Rate:  [64-67] 64  Resp:  [18] 18  BP: (111-131)/(56-62) 118/56  SpO2:  [93 %-96 %] 96 %  on   ;   Device (Oxygen Therapy): room air  Body mass index is 37.02 kg/m².    Wt Readings from Last 3 Encounters:   06/02/22 117 kg (258 lb)     Intake & Output (last 3 days)       05/30 0701 05/31 0700 05/31 0701 06/01 0700 06/01 0701  06/02 0700 06/02 0701  06/03 0700    P.O.  360    I.V. (mL/kg)  500 (4.3) 500 (4.3)     Total Intake(mL/kg) 720 (6.1) 1370 (11.9) 1760 (15) 360 (3.1)    Urine (mL/kg/hr) 2750 (1) 4850 (1.8) 1850 (0.7)     Stool 0 0 0     Total Output 2750 4850 1850     Net -2030 -3480 -90 +360            Stool Unmeasured Occurrence 2 x 4 x 4 x         Diet Regular; Cardiac, Consistent Carbohydrate, Daily Fluid Restriction; 1500 mL Fluid Per Day; High Protein  ----------------------------------------------------------------------------------------------------------------------  Physical exam:  Constitutional: Middle-age male, appears older than stated age,, Well-developed and well-nourished, resting comfortably in bed, no acute distress.      HENT:  Head:  Normocephalic and atraumatic.  Mouth:  Moist mucous membranes.    Eyes:  Conjunctivae and EOM are normal. No scleral icterus.   Cardiovascular:  Normal rate, regular rhythm and normal heart sounds with no murmur. No JVD.   Pulmonary/Chest:  No respiratory  distress, no wheezes, mild bibasilar crackles, with normal breath sounds and good air movement. Unlabored. No accessory muscle use.  Abdominal:  Soft. No distension and no tenderness.  Bowel sounds present. No rebound or guarding.   Musculoskeletal:  No tenderness, left lower extremity wrapped in Ace bandage, no red or swollen joints anywhere.    Neurological:  Alert and oriented to person, place, and time.  No cranial nerve deficit.   Nonfocal.   Skin:  Skin is warm and dry. No rash noted. No pallor.   Peripheral vascular:  No clubbing, no cyanosis, no edema. Pedal and tibial pulses 2/4 bilaterally.   : Improved scrotal edema, Ortiz  ----------------------------------------------------------------------------------------------------------------------  Results from last 7 days   Lab Units 05/30/22  0628 05/28/22  0520 05/27/22  0159   CRP mg/dL  --   --  5.05*   WBC 10*3/mm3 3.63 3.59 4.70   HEMOGLOBIN g/dL 9.1* 9.1* 9.2*   HEMATOCRIT % 28.1* 29.1* 28.5*   MCV fL 78.5* 79.1 78.3*   MCHC g/dL 32.4 31.3* 32.3   PLATELETS 10*3/mm3 120* 127* 136*   INR  1.04  --   --          Results from last 7 days   Lab Units 06/01/22  0452 05/31/22  1140 05/30/22  0628 05/29/22  0729 05/28/22  0520   SODIUM mmol/L 134* 133* 136 137 134*   POTASSIUM mmol/L 3.7 3.9 4.1 4.0 4.0   MAGNESIUM mg/dL  --  1.9  --  2.0 1.9   CHLORIDE mmol/L 103 103 103 103 101   CO2 mmol/L 24.3 23.7 24.7 27.0 25.7   BUN mg/dL 19 20 15 16 19   CREATININE mg/dL 0.70* 0.78 0.65* 0.64* 0.62*   CALCIUM mg/dL 7.8* 7.7* 7.8* 7.9* 7.8*   GLUCOSE mg/dL 174* 232* 197* 116* 177*   ALBUMIN g/dL  --   --  1.92*  --   --    BILIRUBIN mg/dL  --   --  0.3  --   --    ALK PHOS U/L  --   --  337*  --   --    AST (SGOT) U/L  --   --  67*  --   --    ALT (SGPT) U/L  --   --  43*  --   --    Estimated Creatinine Clearance: 149.2 mL/min (A) (by C-G formula based on SCr of 0.7 mg/dL (L)).  No results found for: AMMONIA  Results from last 7 days   Lab Units 05/30/22  0695   CK  TOTAL U/L 36             Glucose   Date/Time Value Ref Range Status   06/02/2022 1026 252 (H) 70 - 130 mg/dL Final     Comment:     Meter: [id number] : [id number] rayshawn bolivar   06/02/2022 0636 98 70 - 130 mg/dL Final     Comment:     Meter: [id number] : [id number] VITOR ENNIS   06/02/2022 0545 104 70 - 130 mg/dL Final     Comment:     Meter: [id number] : [id number] SHAD MANUEL   06/01/2022 1643 91 70 - 130 mg/dL Final     Comment:     Meter: [id number] : [id number] DEMOND MARISCALH   06/01/2022 1044 279 (H) 70 - 130 mg/dL Final     Comment:     Meter: [id number] : [id number] youngallen brown   06/01/2022 0625 203 (H) 70 - 130 mg/dL Final     Comment:     Meter: [id number] : [id number] VITOR ENNIS   05/31/2022 1614 242 (H) 70 - 130 mg/dL Final     Comment:     Meter: [id number] : [id number] Edna Bell L   05/31/2022 1014 254 (H) 70 - 130 mg/dL Final     Comment:     Meter: [id number] : [id number] Edna Bell L     Lab Results   Component Value Date    TSH 4.700 (H) 05/30/2022    FREET4 0.87 (L) 05/30/2022     No results found for: PREGTESTUR, PREGSERUM, HCG, HCGQUANT  Pain Management Panel     Pain Management Panel Latest Ref Rng & Units 5/24/2022 5/11/2022    CREATININE UR mg/dL 91.0 -    AMPHETAMINES SCREEN, URINE Negative - Negative    BARBITURATES SCREEN Negative - Negative    BENZODIAZEPINE SCREEN, URINE Negative - Negative    BUPRENORPHINEUR Negative - Negative    COCAINE SCREEN, URINE Negative - Negative    METHADONE SCREEN, URINE Negative - Negative    METHAMPHETAMINEUR Negative - Negative        Brief Urine Lab Results  (Last result in the past 365 days)      Color   Clarity   Blood   Leuk Est   Nitrite   Protein   CREAT   Urine HCG        05/24/22 1839             91.0             No results found for: BLOODCX  No results found for: URINECX  No results found for: WOUNDCX  No results found for: STOOLCX  No results found for: RESPCX  No results found for: AFBCX  Results  from last 7 days   Lab Units 05/27/22  0159   CRP mg/dL 5.05*       I have personally looked at the labs and they are summarized above.  ----------------------------------------------------------------------------------------------------------------------  Detailed radiology reports for the last 24 hours:  Imaging Results (Last 24 Hours)     ** No results found for the last 24 hours. **        Assessment & Plan      Patient is a 57-year-old male with history significant for type 2 diabetes mellitus, hypertension who presented to the ER with left foot pain after injury 10 days PTA.    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  Was previously seen by outpatient urgent care where x-rays were negative and he completed 10 days of antibiotic therapy PTA.  --Admission labs: WBC 15 K, lactate 3, CRP 22, sodium 127  --Bone culture finalized MRSA  --Left upper extremity PICC placed 5/20  --Continue IV vancomycin through 6/23  --Podiatry following, appreciate recommendations  --ID following, appreciate recommendations    #Uncontrolled type 2 diabetes mellitus  --A1c 13%, A.m. glucose 98, continue basal and mealtime with SSI, mealtime 15 units 3 times daily, add metformin, adjust as needed    #PAZ cirrhosis  #Portal HTN  #Anasarca  --Net negative approximately 25 L over the past week with significant improvement of symptoms, still has significant scrotal edema on exam.  UA without nephrotic range proteinuria, echo unremarkable.  --AI w/u negative, alpha antitrypsin level pending  --CT abdomen/pelvis noted advanced cirrhosis with portal  hypertension  --Avoid ACE/ARB in the setting of cirrhosis  --Continue low-dose nadolol, has been able to tolerate thus far-heart rate in the 60s therefore will not increase  --continue p.o. Lasix for volume control, monitor potassium, add Aldactone 50 mg, increase 200 mg if BP tolerates  --Will need outpatient endoscopy evaluation for varices    #Chronic microcytic anemia, iron, B/12/folate w/u unremarkable  #Essential hypertension, controlled  #Hyperlipidemia, high intensity statin  #Scrotal edema due to the above, evaluated by urology, recommended elevation of scrotum, improving  #Obesity, BMI 36, complicates all aspects of care    CHECKLIST:  Abx: Vancomycin-end date 6/23  VTE: Lovenox  GI ppx: PPI  Diet: Consistent carb  Code: CPR, full  Dispo: Patient has medically improved. Inpatient rehab consult placed.  Voiding trial, remove Ortiz today.  Case management following for placement.    Robby Quinones DO  [facility] Hospitalist  06/02/22  11:43 EDT

## 2022-06-02 NOTE — PROGRESS NOTES
Rehabilitation Nursing  Inpatient Rehabilitation Plan of Care Note    Plan of Care  Pain    Pain Management (Active)  Current Status (6/2/2022 4:40:00 PM): potential for increased pain  Weekly Goal: pain at a tolerable level  Discharge Goal: no pain    Body Function Structure    Skin Integrity (Active)  Current Status (6/2/2022 4:40:00 PM): impaired skin integrity  Weekly Goal: improved skin integrity  Discharge Goal: wound healing    Safety    Potential for Injury (Active)  Current Status (6/2/2022 4:40:00 PM): potential for falls  Weekly Goal: no falls  Discharge Goal: no falls    Signed by: Hellne Mcdonnell RN

## 2022-06-02 NOTE — PLAN OF CARE
Goal Outcome Evaluation:           Progress: improving  Outcome Evaluation: Pt a&ox4. RA. Reg CCC diet with 1500 mL FR with increased protein. Non weight bearing to R foot.  Wound care as ordered to R foot.  Pt received PRN pain meds prior to dressing change.  No complaints at this time. Call light in reach with bed low, locked, and alarmed.

## 2022-06-03 LAB
GLUCOSE BLDC GLUCOMTR-MCNC: 181 MG/DL (ref 70–130)
GLUCOSE BLDC GLUCOMTR-MCNC: 182 MG/DL (ref 70–130)
GLUCOSE BLDC GLUCOMTR-MCNC: 248 MG/DL (ref 70–130)
GLUCOSE BLDC GLUCOMTR-MCNC: 259 MG/DL (ref 70–130)

## 2022-06-03 PROCEDURE — 25010000002 VANCOMYCIN 5 G RECONSTITUTED SOLUTION: Performed by: FAMILY MEDICINE

## 2022-06-03 PROCEDURE — 97161 PT EVAL LOW COMPLEX 20 MIN: CPT

## 2022-06-03 PROCEDURE — 63710000001 INSULIN DETEMIR PER 5 UNITS: Performed by: FAMILY MEDICINE

## 2022-06-03 PROCEDURE — 97110 THERAPEUTIC EXERCISES: CPT

## 2022-06-03 PROCEDURE — 97535 SELF CARE MNGMENT TRAINING: CPT | Performed by: OCCUPATIONAL THERAPIST

## 2022-06-03 PROCEDURE — 97167 OT EVAL HIGH COMPLEX 60 MIN: CPT | Performed by: OCCUPATIONAL THERAPIST

## 2022-06-03 PROCEDURE — 97110 THERAPEUTIC EXERCISES: CPT | Performed by: OCCUPATIONAL THERAPIST

## 2022-06-03 PROCEDURE — 25010000002 ENOXAPARIN PER 10 MG: Performed by: FAMILY MEDICINE

## 2022-06-03 PROCEDURE — 63710000001 INSULIN ASPART PER 5 UNITS: Performed by: FAMILY MEDICINE

## 2022-06-03 PROCEDURE — 82962 GLUCOSE BLOOD TEST: CPT

## 2022-06-03 RX ORDER — FUROSEMIDE 40 MG/1
40 TABLET ORAL
Status: DISCONTINUED | OUTPATIENT
Start: 2022-06-03 | End: 2022-06-16 | Stop reason: HOSPADM

## 2022-06-03 RX ADMIN — NYSTATIN: 100000 POWDER TOPICAL at 20:28

## 2022-06-03 RX ADMIN — FUROSEMIDE 40 MG: 40 TABLET ORAL at 17:28

## 2022-06-03 RX ADMIN — INSULIN DETEMIR 45 UNITS: 100 INJECTION, SOLUTION SUBCUTANEOUS at 08:29

## 2022-06-03 RX ADMIN — INSULIN ASPART 3 UNITS: 100 INJECTION, SOLUTION INTRAVENOUS; SUBCUTANEOUS at 08:26

## 2022-06-03 RX ADMIN — HYDROCODONE BITARTRATE AND ACETAMINOPHEN 1 TABLET: 7.5; 325 TABLET ORAL at 20:27

## 2022-06-03 RX ADMIN — INSULIN ASPART 15 UNITS: 100 INJECTION, SOLUTION INTRAVENOUS; SUBCUTANEOUS at 08:38

## 2022-06-03 RX ADMIN — Medication 10 ML: at 08:43

## 2022-06-03 RX ADMIN — Medication 10 ML: at 20:28

## 2022-06-03 RX ADMIN — INSULIN ASPART 2 UNITS: 100 INJECTION, SOLUTION INTRAVENOUS; SUBCUTANEOUS at 17:28

## 2022-06-03 RX ADMIN — INSULIN ASPART 15 UNITS: 100 INJECTION, SOLUTION INTRAVENOUS; SUBCUTANEOUS at 12:27

## 2022-06-03 RX ADMIN — VANCOMYCIN HYDROCHLORIDE 1750 MG: 5 INJECTION, POWDER, LYOPHILIZED, FOR SOLUTION INTRAVENOUS at 08:29

## 2022-06-03 RX ADMIN — NADOLOL 20 MG: 40 TABLET ORAL at 08:34

## 2022-06-03 RX ADMIN — NYSTATIN: 100000 POWDER TOPICAL at 08:42

## 2022-06-03 RX ADMIN — Medication 10 ML: at 08:42

## 2022-06-03 RX ADMIN — VANCOMYCIN HYDROCHLORIDE 1750 MG: 5 INJECTION, POWDER, LYOPHILIZED, FOR SOLUTION INTRAVENOUS at 20:47

## 2022-06-03 RX ADMIN — PANTOPRAZOLE SODIUM 40 MG: 40 TABLET, DELAYED RELEASE ORAL at 06:39

## 2022-06-03 RX ADMIN — ATORVASTATIN CALCIUM 40 MG: 40 TABLET, FILM COATED ORAL at 20:27

## 2022-06-03 RX ADMIN — INSULIN ASPART 15 UNITS: 100 INJECTION, SOLUTION INTRAVENOUS; SUBCUTANEOUS at 17:30

## 2022-06-03 RX ADMIN — Medication 1 TABLET: at 08:41

## 2022-06-03 RX ADMIN — METFORMIN HYDROCHLORIDE 500 MG: 500 TABLET ORAL at 17:30

## 2022-06-03 RX ADMIN — ENOXAPARIN SODIUM 40 MG: 40 INJECTION SUBCUTANEOUS at 20:27

## 2022-06-03 RX ADMIN — DOCUSATE SODIUM 50 MG AND SENNOSIDES 8.6 MG 2 TABLET: 8.6; 5 TABLET, FILM COATED ORAL at 20:27

## 2022-06-03 RX ADMIN — FUROSEMIDE 40 MG: 40 TABLET ORAL at 09:10

## 2022-06-03 RX ADMIN — DOCUSATE SODIUM 50 MG AND SENNOSIDES 8.6 MG 2 TABLET: 8.6; 5 TABLET, FILM COATED ORAL at 08:41

## 2022-06-03 RX ADMIN — METFORMIN HYDROCHLORIDE 500 MG: 500 TABLET ORAL at 08:36

## 2022-06-03 RX ADMIN — SPIRONOLACTONE 50 MG: 25 TABLET ORAL at 08:34

## 2022-06-03 RX ADMIN — INSULIN ASPART 2 UNITS: 100 INJECTION, SOLUTION INTRAVENOUS; SUBCUTANEOUS at 17:29

## 2022-06-03 RX ADMIN — ASPIRIN 81 MG: 81 TABLET, COATED ORAL at 08:36

## 2022-06-03 RX ADMIN — INSULIN ASPART 4 UNITS: 100 INJECTION, SOLUTION INTRAVENOUS; SUBCUTANEOUS at 12:26

## 2022-06-03 NOTE — PROGRESS NOTES
Rehabilitation Nursing  Inpatient Rehabilitation Plan of Care Note    Plan of Care  Copy from Junie    Pain Management (Active)  Current Status (6/2/2022 4:40:00 PM): potential for increased pain  Weekly Goal: pain at a tolerable level  Discharge Goal: no pain    Body Function Structure    Skin Integrity (Active)  Current Status (6/2/2022 4:40:00 PM): impaired skin integrity  Weekly Goal: improved skin integrity  Discharge Goal: wound healing    Safety    Potential for Injury (Active)  Current Status (6/2/2022 4:40:00 PM): potential for falls  Weekly Goal: no falls  Discharge Goal: no falls    Signed by: Rosie Goldman, Nurse

## 2022-06-03 NOTE — PROGRESS NOTES
Inpatient Rehabilitation Functional Measures Assessment and Plan of Care    Plan of Care      Functional Measures  SHANNON Eating:  SHANNON Grooming:  SHANNON Bathing:  SHANNON Upper Body Dressing:  SHANNON Lower Body Dressing:  SHANNON Toileting:    SHANNON Bladder Management  Level of Assistance:  Frequency/Number of Accidents this Shift:    SHANNON Bowel Management  Level of Assistance:  Frequency/Number of Accidents this Shift:    SHANNON Bed/Chair/Wheelchair Transfer:  Bed/chair/wheelchair Transfer Score = 4.  Patient performs 75% or more of effort and minimal assistance (little/incidental  help/lifting of one limb/steadying) for transferring to and from the  bed/chair/wheelchair, requiring: Patient requires the following assistive  device(s): Walker. Seating system of wheelchair.  SHANNON Toilet Transfer:  SHANNON Tub/Shower Transfer:    Previously Documented Mode of Locomotion at Discharge:  SHANNON Expected Mode of Locomotion at Discharge: The expected mode of most  frequently used locomotion, at discharge, is expected to be both walking and  wheelchair.  SHANNON Walk/Wheelchair:  WHEELCHAIR OBSERVATION   Wheelchair did not occur.    WALK OBSERVATION   Walking did not occur.  SHANNON Stairs:  Stairs did not occur.    SHANNON Comprehension:  SHANNON Expression:  SHANNON Social Interaction:  SHANNON Problem Solving:  SHANNON Memory:    Therapy Mode Minutes  Occupational Therapy:  Physical Therapy: Individual: 90 minutes.  Speech Language Pathology:    Discharge Functional Goals:  Bed, Chair, Wheelchair Transfers: 5  Walk: 5    Signed by: Willow Villalta, Physical Therapist

## 2022-06-03 NOTE — H&P
[facility] HOSPITALIST HISTORY AND PHYSICAL    Patient Identification:  Name:  Melchor Hardwick  Age:  57 y.o.  Sex:  male  :  1965  MRN:  [id number]   Visit Number:  [id number]  Room number:  109/2S  Primary Care Physician:  Missy Up APRN     Subjective     2022   Chief complaint:  No chief complaint on file.      History of presenting illness:  57 y.o. male  This pt is seen today for post admission physician evaluation to the inpatient rehabilitation facility.  Patient is a 57-year-old gentleman with a past history of diabetes mellitus, hyperlipidemia, hypertension, perineal abscess drainage in the remote past.  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  Patient had what looked like to be a blood blister.  He was placed on oral antibiotics and he completed the course.  He states that 4 days prior to admission notes the ulceration had an ulceration developed on the left foot that worsened with erythema and edema.  Patient does have neuropathy from his diabetes mellitus.  Patient was brought in with severe sepsis secondary to left foot osteomyelitis.  Diabetes was uncontrolled.  Patient was initially taken to the OR by podiatry for left foot washout and IV antibiotics.  Unfortunately patient did not improve and he did require midfoot amputation.  Patient did have bacteremia as well.  Patient also had scrotal anasarca and edema generalized edema.  And was diagnosed with Valencia cirrhosis with portal hypertension.  Patient has been continued on IV antibiotics for MRSA bacteremia as well as his osteomyelitis.  Patient has improved somewhat medically and was thought to be an excellent candidate for inpatient physical rehab as he has been diagnosed with critical illness myopathy.      Patient continued to progress medically.  Physical therapy and Occupational therapy evaluations were completed with  recommended acute inpatient rehabilitation referral for continued functional mobility intervention and reeducation.  Acute rehab referral ordered for continued medical monitoring and management post prolonged hospitalization, continued respiratory status monitoring, lab monitoring, pain mgt needs, bowel/bladder care with new medication education, skin monitoring and breakdown prevention along with ongoing medical comorbidities that require ongoing care.    The preadmission mini-FIM score as assessed by the referring facility as follows: Eating 5; grooming 4; bathing 2; dressing upper body for; dressing lower body to; transfers to bed chair and wheelchair for; transfers to toilet for; locomotion 4; bladder management 1; bowel management 6; comprehension 7; expression 7; substractions 7; problem solving 7; memory 7  Estimated length of stay 14 days    ---------------------------------------------------------------------------------------------------------------------   Review of Systems:  General: No fever no chills  HEENT: Negative  Heart: Negative; generalized edema  Lungs: Negative  Abdomen: History of Valencia cirrhosis with edema  : Scrotal edema  Musculoskeletal: Status post left midfoot amputation  Neuro: Sensory state changes in a stocking glove distribution  Skin: Negative  ---------------------------------------------------------------------------------------------------------------------   Past Medical History:   Diagnosis Date   • Diabetes mellitus (HCC)    • Elevated cholesterol    • Hyperlipidemia    • Hypertension      Past Surgical History:   Procedure Laterality Date   • ABSCESS DRAINAGE      PERINEUM   • AMPUTATION FOOT Left 5/18/2022    Procedure: AMPUTATION FOOT;  Surgeon: Addison Colby MD;  Location: [facility] OR;  Service: Podiatry;  Laterality: Left;   • INCISION AND DRAINAGE LEG Left 05/10/2022    Procedure: INCISION AND DRAINAGE LOWER EXTREMITY;  Surgeon: Addison Colby MD;  Location: [facility]  OR;  Service: Podiatry;  Laterality: Left;   • WOUND DEBRIDEMENT Left 05/13/2022    Procedure: DEBRIDEMENT FOOT;  Surgeon: Addison Colby MD;  Location: [facility];  Service: Podiatry;  Laterality: Left;     No family history on file.  Social History     Socioeconomic History   • Marital status: Single   Tobacco Use   • Smoking status: Never Smoker   • Smokeless tobacco: Never Used   Substance and Sexual Activity   • Alcohol use: Never   • Drug use: Never   • Sexual activity: Defer     ---------------------------------------------------------------------------------------------------------------------   Allergies:  Patient has no known allergies.  ---------------------------------------------------------------------------------------------------------------------   Medications below are reported home medications pulling from within the system; at this time, these medications have not been reconciled unless otherwise specified and are in the verification process for further verifcation as current home medications.    Prior to Admission Medications     Prescriptions Last Dose Informant Patient Reported? Taking?    glimepiride (AMARYL) 4 MG tablet Unknown Pharmacy Yes No    Take 4 mg by mouth 2 (Two) Times a Day.    hydroCHLOROthiazide (HYDRODIURIL) 12.5 MG tablet Unknown Pharmacy Yes No    Take 12.5 mg by mouth Daily As Needed (edema).    insulin NPH (humuLIN N,novoLIN N) 100 UNIT/ML injection Unknown Pharmacy Yes No    Inject 30 Units under the skin into the appropriate area as directed 2 (Two) Times a Day Before Meals.    losartan (COZAAR) 100 MG tablet Unknown Pharmacy Yes No    Take 1 tablet by mouth Daily.    lovastatin (MEVACOR) 10 MG tablet Unknown Pharmacy Yes No    Take 10 mg by mouth Every Night.    metFORMIN (GLUCOPHAGE) 1000 MG tablet Unknown Pharmacy Yes No    Take 1,000 mg by mouth 2 (Two) Times a Day With Meals.        Objective     Vital Signs:  Temp:  [97.6 °F (36.4 °C)-98.5 °F (36.9 °C)] 97.6 °F  (36.4 °C)  Heart Rate:  [62-75] 75  Resp:  [18-20] 20  BP: (104-123)/(58-72) 117/72    No data found.  SpO2:  [94 %-95 %] 94 %  on   ;   Device (Oxygen Therapy): room air  Body mass index is 37.02 kg/m².    Wt Readings from Last 3 Encounters:   06/02/22 117 kg (258 lb)   06/02/22 117 kg (258 lb)      ---------------------------------------------------------------------------------------------------------------------     Physical exam:  Constitutional:   No acute distress  HEENT: Normocephalic atraumatic  Neck: Supple   Cardiovascular: Regular rate and rhythm  Pulmonary/Chest: Clear to auscultation  Abdominal: Positive bowel sounds soft.   Musculoskeletal: Status post left midfoot amputation  Neurological: Sensory changes stocking glove distribution  Skin: No obvious rash.  Patient does have scrotal edema and left foot is dressed  Peripheral vascular: 1-2+ edema in the lower extremities.    Genitourinary:: Scrotal edema  ---------------------------------------------------------------------------------------------------------------------    No orders to display           Last echocardiogram:  Results for orders placed during the hospital encounter of 05/10/22    Adult Transthoracic Echo Complete W/ Cont if Necessary Per Protocol    Interpretation Summary  · Normal left ventricular cavity size and wall thickness noted. All left ventricular wall segments contract normally.  · Left ventricular ejection fraction appears to be 61 - 65%.  · The mitral valve is structurally normal with no significant stenosis present. Trace mitral valve regurgitation is present.  · The aortic valve is structurally normal with no regurgitation or stenosis present.  · There is no evidence of pericardial effusion.    --------------------------------------------------------------------------------------------------------------------  Labs:  Results from last 7 days   Lab Units 05/30/22  0628 05/28/22  0520   WBC 10*3/mm3 3.63 3.59   HEMOGLOBIN  g/dL 9.1* 9.1*   HEMATOCRIT % 28.1* 29.1*   MCV fL 78.5* 79.1   MCHC g/dL 32.4 31.3*   PLATELETS 10*3/mm3 120* 127*   INR  1.04  --          Results from last 7 days   Lab Units 06/01/22  0452 05/31/22  1140 05/30/22  0628 05/29/22  0729 05/28/22  0520   SODIUM mmol/L 134* 133* 136 137 134*   POTASSIUM mmol/L 3.7 3.9 4.1 4.0 4.0   MAGNESIUM mg/dL  --  1.9  --  2.0 1.9   CHLORIDE mmol/L 103 103 103 103 101   CO2 mmol/L 24.3 23.7 24.7 27.0 25.7   BUN mg/dL 19 20 15 16 19   CREATININE mg/dL 0.70* 0.78 0.65* 0.64* 0.62*   CALCIUM mg/dL 7.8* 7.7* 7.8* 7.9* 7.8*   GLUCOSE mg/dL 174* 232* 197* 116* 177*   ALBUMIN g/dL  --   --  1.92*  --   --    BILIRUBIN mg/dL  --   --  0.3  --   --    ALK PHOS U/L  --   --  337*  --   --    AST (SGOT) U/L  --   --  67*  --   --    ALT (SGPT) U/L  --   --  43*  --   --    Estimated Creatinine Clearance: 149.2 mL/min (A) (by C-G formula based on SCr of 0.7 mg/dL (L)).  No results found for: AMMONIA  Results from last 7 days   Lab Units 05/30/22 0628   CK TOTAL U/L 36         Glucose   Date/Time Value Ref Range Status   06/03/2022 0755 248 (H) 70 - 130 mg/dL Final     Comment:     Meter: [id number] : [id number] Penelope Dawson   06/02/2022 1946 253 (H) 70 - 130 mg/dL Final     Comment:     Meter: [id number] : [id number] Rayshawn Wu   06/02/2022 1721 244 (H) 70 - 130 mg/dL Final     Comment:     Meter: [id number] : [id number] MICHAEL OWENS   06/02/2022 1026 252 (H) 70 - 130 mg/dL Final     Comment:     Meter: [id number] : [id number] rayshawn bolivar   06/02/2022 0636 98 70 - 130 mg/dL Final     Comment:     Meter: [id number] : [id number] VITOR ENNIS   06/02/2022 0545 104 70 - 130 mg/dL Final     Comment:     Meter: [id number] : [id number] SHAD MANUEL   06/01/2022 1643 91 70 - 130 mg/dL Final     Comment:     Meter: [id number] : [id number] DEMOND RODRIGUEZ   06/01/2022 1044 279 (H) 70 - 130 mg/dL Final     Comment:     Meter: [id number] : [id number] hector  stephanie     Lab Results   Component Value Date    TSH 4.700 (H) 05/30/2022    FREET4 0.87 (L) 05/30/2022     No results found for: PREGTESTUR, PREGSERUM, HCG, HCGQUANT  Pain Management Panel     Pain Management Panel Latest Ref Rng & Units 5/24/2022 5/11/2022    CREATININE UR mg/dL 91.0 -    AMPHETAMINES SCREEN, URINE Negative - Negative    BARBITURATES SCREEN Negative - Negative    BENZODIAZEPINE SCREEN, URINE Negative - Negative    BUPRENORPHINEUR Negative - Negative    COCAINE SCREEN, URINE Negative - Negative    METHADONE SCREEN, URINE Negative - Negative    METHAMPHETAMINEUR Negative - Negative        Brief Urine Lab Results  (Last result in the past 365 days)      Color   Clarity   Blood   Leuk Est   Nitrite   Protein   CREAT   Urine HCG        05/24/22 1839             91.0             No results found for: BLOODCX  No results found for: URINECX  No results found for: WOUNDCX  No results found for: STOOLCX    Last Urine Toxicity     LAST URINE TOXICITY RESULTS Latest Ref Rng & Units 5/24/2022 5/11/2022    CREATININE UR mg/dL 91.0 -    AMPHETAMINES SCREEN, URINE Negative - Negative    BARBITURATES SCREEN Negative - Negative    BENZODIAZEPINE SCREEN, URINE Negative - Negative    BUPRENORPHINEUR Negative - Negative    COCAINE SCREEN, URINE Negative - Negative    METHADONE SCREEN, URINE Negative - Negative    METHAMPHETAMINEUR Negative - Negative          I have personally looked at the labs and they are summarized above.  ----------------------------------------------------------------------------------------------------------------------  Detailed radiology reports for the last 24 hours:    Imaging Results (Last 24 Hours)     ** No results found for the last 24 hours. **        Final impressions for the last 30 days of radiology reports:    CT Abdomen Pelvis Without Contrast    Result Date: 5/25/2022  1.  Advanced liver cirrhosis with changes of portal hypertension which include spinal megaly, prominent  portosystemic collateral vessels, small-moderate volume ascites, and marked anasarca. 2.  Moderate constipation and mild generalized ileus. No bowel obstruction. 3.  Miniscule pleural effusions. 4.  Other incidental and nonacute findings detailed above.  This report was finalized on 5/25/2022 8:48 AM by Dr. Brain Rodriguez MD.      XR Foot 3+ View Left    Result Date: 5/10/2022    Degenerative change of the midfoot.  This report was finalized on 5/10/2022 4:17 PM by Dr. Oswaldo Brown MD.      US Abdomen Complete    Result Date: 5/20/2022  1. Splenomegaly. 2. Small volume ascites.  This report was finalized on 5/20/2022 12:59 PM by Dr. Lobo Ring MD.      US Scrotum & Testicles    Result Date: 5/20/2022  Extensive scrotal wall swelling.   This report was finalized on 5/20/2022 12:59 PM by Dr. Lobo Ring MD.      US Scrotum & Testicles    Result Date: 5/16/2022  Scrotal wall thickening  This report was finalized on 5/16/2022 4:57 PM by Dr. Lobo Ring MD.      US Arterial Doppler Lower Extremity Left    Result Date: 5/10/2022  Abnormal examination demonstrating elevated flow velocities throughout the left lower extremity arterial system and biphasic flow within the left posterior tibial artery. Contemporaneously performed CT demonstrates imaging findings highly suspicious for gas gangrene and infection/osteomyelitis. CT dictated separately. Signer Name: Oscar Dixon MD  Signed: 5/10/2022 7:16 PM  Workstation Name: [facility]  [facility]    MRI Foot Left Without Contrast    Result Date: 5/16/2022  Appearance concerning for osteomyelitis involving the 3rd metatarsal and likely the adjacent cuneiform.  This report was finalized on 5/16/2022 3:10 PM by Dr. Lobo Ring MD.      US Venous Doppler Lower Extremity Bilateral (duplex)    Result Date: 5/24/2022  No sonographic findings of DVT in the lower extremities.  This report was finalized on 5/24/2022 8:05 AM by Dr. Garry Lovelace II,  MD.      CT Lower Extremity Left With Contrast    Result Date: 5/10/2022  Marked soft tissue edema and fat stranding at the level of the foot, diffuse with scattered foci of air both within soft tissues and bony structures. Findings are concerning for gas gangrene and osteomyelitis. Both the midfoot and forefoot are involved. Signer Name: ANTELMO COFFMAN MD  Signed: 5/10/2022 6:14 PM  Workstation Name: [email or web address]  [facility]    XR Foreign Body For MRI    Result Date: 5/16/2022  No radiopaque foreign body identified  This report was finalized on 5/16/2022 10:20 AM by Dr. Lobo Ring MD.      I have personally looked at the radiology images and read the final radiology report.    Assessment & Plan    Critical illness myopathy  Patient require physical therapy 90 minutes/day 5 to 6 days/week with training, safety, stair navigation, strengthening, therapeutic exercise, range of motion, endurance and gait training.  Will require occupational therapy 90 minutes/day 5 to 6 days/week with dressing, ADLs, feeding, skills, safety and toileting.  Expect patient be able to safely perform activities of daily living using adaptive equipment for improved function mobility.  Independent and safe bowel bladder function.  Able to navigate home and community territory safe without injury or falls.  Patient is projected to go home with assistance will likely require home health services with PT, OT and nursing services.  We expect patient will likely require walker, wheelchair and bedside commode at home    Status post left midfoot amputation secondary to osteomyelitis/staff aureus bacteremia--we will continue vancomycin through June 23, 2022    General edema with scrotal edema--we will increase Lasix to 40 mg twice daily at this time.    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  Nadolol.  Patient may benefit from outpatient GI evaluation to rule out esophageal varices for  screening.    Diabetes mellitus fair control.  Patient getting Levemir 45 units nightly and continues to mealtime insulin coverage with 15 units before meals    Patient receiving bladder training as he does have Ortiz catheter in place at this time    Microcytic anemia stable    Hypertension fair control    Hyperlipidemia continue statin therapy    Obesity with BMI of 37.02 kg per metered square    VTE Prophylaxis:   Mechanical Order History:      Ordered        06/02/22 1707  Maintain Sequential Compression Device  Continuous                    Pharmalogical Order History:      Ordered     Dose Route Frequency Stop    06/02/22 1707  Enoxaparin Sodium (LOVENOX) syringe 40 mg         40 mg SC Nightly --                Fall risk evaluation: Elevated risk for fall secondary to generalized weakness and recent left forefoot amputation      Joe Mike MD  [facility]  06/03/22  09:25 EDT

## 2022-06-03 NOTE — PLAN OF CARE
"  Problem: Rehabilitation (IR[facility]) Plan of Care  Goal: Plan of Care Review  Outcome: Ongoing, Progressing  Flowsheets (Taken 6/3/2022 0425)  Progress: no change  Plan of Care Reviewed With: patient  Outcome Evaluation:   new admission   bladder training continued through the night, pt tolerated well, but stated he did not \"feel\" the sensation to urinate   resting well throughout the night  Goal: Patient-Specific Goal (Individualized)  Outcome: Ongoing, Progressing  Goal: Absence of New-Onset Illness or Injury  Outcome: Ongoing, Progressing  Intervention: Prevent Fall and Fall Injury  Recent Flowsheet Documentation  Taken 6/3/2022 0400 by Caitlyn Mota, RN  Safety Promotion/Fall Prevention:   safety round/check completed   nonskid shoes/slippers when out of bed  Taken 6/3/2022 0214 by Caitlyn Mota, RN  Safety Promotion/Fall Prevention:   room organization consistent   safety round/check completed   nonskid shoes/slippers when out of bed   clutter free environment maintained  Taken 6/3/2022 0002 by Caitlyn Mota, RN  Safety Promotion/Fall Prevention:   safety round/check completed   room organization consistent  Taken 6/2/2022 2200 by Caitlyn Mota, RN  Safety Promotion/Fall Prevention:   safety round/check completed   nonskid shoes/slippers when out of bed  Taken 6/2/2022 2000 by Caitlyn Mota, RN  Safety Promotion/Fall Prevention:   safety round/check completed   nonskid shoes/slippers when out of bed   clutter free environment maintained  Goal: Optimal Comfort and Wellbeing  Outcome: Ongoing, Progressing  Goal: Home and Community Transition Plan Established  Outcome: Ongoing, Progressing   Goal Outcome Evaluation:  Plan of Care Reviewed With: patient        Progress: no change  Outcome Evaluation: new admission; bladder training continued through the night, pt tolerated well, but stated he did not \"feel\" the sensation to urinate; resting well throughout the " night

## 2022-06-03 NOTE — PROGRESS NOTES
Patient Assessment Instrument  Quality Indicators - Admission    Section B. Hearing, Speech Vision      Section C. Cognitive Patterns      Section [id number]. Prior Functioning      Section HL1400. Prior Device Use      Section OH2256. Self Care Performance     Eating: Aurora sets up or cleans up; patient completes activity. Aurora assists  only prior to or following the activity.   Oral Hygiene: Aurora provides verbal cues and/or touching/steadying and/or  contact guard assistance as patient completes activity.   Toileting Hygiene: Aurora does all of the effort. Patient does none of the  effort to complete the activity. Or, the assistance of 2 or more helpers is  required for the patient to complete the activity.   Shower/Bathe Self: Aurora does more than half the effort. Aurora lifts or holds  trunk or limbs and provides more than half the effort.   Upper Body Dressing: Aurora provides verbal cues and/or touching/steadying  and/or contact guard assistance as patient completes activity.   Lower Body Dressing: Aurora does more than half the effort. Aurora lifts or  holds trunk or limbs and provides more than half the effort.   Putting On/Taking Off Footwear: Aurora does more than half the effort. Aurora  lifts or holds trunk or limbs and provides more than half the effort.    Section CI7229. Self Care Discharge Goals     Eating: Patient completed the activities by him/herself with no assistance from  a helper.   Oral Hygiene: Aurora sets up or cleans up; patient completes activity. Aurora  assists only prior to or following the activity.   Toileting Hygiene: Aurora does less than half the effort. Aurora lifts, holds  or supports trunk or limbs but provides less than half the effort.   Shower/Bathe Self: Aurora does less than half the effort. Aurora lifts, holds  or supports trunk or limbs but provides less than half the effort.   Upper Body Dressing: Aurora sets up or cleans up; patient completes activity.  Aurora  assists only prior to or following the activity.   Lower Body Dressing: Eaton provides verbal cues or touching/steadying  assistance as patient completes activity.   Putting On/Taking Off Footwear: Eaton does less than half the effort. Eaton  lifts, holds or supports trunk or limbs but provides less than half the effort.    Section [id number]. Mobility Performance      Section [id number]. Mobility Discharge Goals      Section H. Bladder and Bowel      Section I. Active Diagnosis      Section J. Health Conditions      Section K. Swallowing/Nutritional Status      Section M. Skin Conditions      Section N. Medication      Section O. Special Treatments, Procedures, and Programs      OPTIONAL BRANCH FOR TRACKING FALLS  Fall(s) During Shift:    Signed by: Mandi Smith, OT

## 2022-06-03 NOTE — PROGRESS NOTES
Inpatient Rehabilitation Functional Measures Assessment and Plan of Care    Plan of Care  Self Care    [OT] Dressing (Lower)(Active)  Current Status(06/03/2022): Max A  Weekly Goal(06/14/2022): Mod A  Discharge Goal: Min A    Performed Intervention(s)  ADL retraining, AE education, GMC/FMC theract, strengthening, fxal mobility    Functional Measures  SHANNON Eating:  SHANNON Grooming:  SHANNON Bathing:  SHANNON Upper Body Dressing:  SHANNON Lower Body Dressing:  SHANNON Toileting:    SHANNON Bladder Management  Level of Assistance:  Frequency/Number of Accidents this Shift:    SHANNON Bowel Management  Level of Assistance:  Frequency/Number of Accidents this Shift:    SHANNON Bed/Chair/Wheelchair Transfer:  SHANNON Toilet Transfer:  SHANNON Tub/Shower Transfer:    Previously Documented Mode of Locomotion at Discharge:  [facility] Expected Mode of Locomotion at Discharge:  SHANNON Walk/Wheelchair:  [facility] Stairs:    SHANNON Comprehension:  SHANNON Expression:  SHANNON Social Interaction:  SHANNON Problem Solving:  SHANNON Memory:    Therapy Mode Minutes  Occupational Therapy: Individual: 90 minutes.  Physical Therapy:  Speech Language Pathology:    Discharge Functional Goals:    Signed by: Mandi Smith OT

## 2022-06-03 NOTE — THERAPY EVALUATION
Inpatient Rehabilitation - Occupational Therapy Initial Evaluation and Treatment Note    YVONNE Gaines     Patient Name: Melchor Hardwick  : 1965  MRN: [id number]    Today's Date: 6/3/2022                 Admit Date: 2022       No diagnosis found.    Patient Active Problem List   Diagnosis   • Hyperlipidemia   • Hypertensive disorder   • Kidney disease   • Obesity   • Type 2 diabetes mellitus (HCC)   • Gas gangrene of foot (HCC)   • Cellulitis in diabetic foot (HCC)   • Other acute osteomyelitis of left foot (HCC)   • Osteomyelitis (HCC)   • Critical illness myopathy       Past Medical History:   Diagnosis Date   • Diabetes mellitus (HCC)    • Elevated cholesterol    • Hyperlipidemia    • Hypertension        Past Surgical History:   Procedure Laterality Date   • ABSCESS DRAINAGE      PERINEUM   • AMPUTATION FOOT Left 2022    Procedure: AMPUTATION FOOT;  Surgeon: Addison Colby MD;  Location: [facility];  Service: Podiatry;  Laterality: Left;   • INCISION AND DRAINAGE LEG Left 05/10/2022    Procedure: INCISION AND DRAINAGE LOWER EXTREMITY;  Surgeon: Addison Colby MD;  Location: [facility];  Service: Podiatry;  Laterality: Left;   • WOUND DEBRIDEMENT Left 2022    Procedure: DEBRIDEMENT FOOT;  Surgeon: Addison Colby MD;  Location: [facility];  Service: Podiatry;  Laterality: Left;             IRF OT ASSESSMENT FLOWSHEET (last 12 hours)     IRF OT Evaluation and Treatment     Row Name 22 [id number]          OT Time and Intention    Document Type initial evaluation;daily treatment  -BF     Mode of Treatment occupational therapy  -BF     Patient Effort good  -BF     Symptoms Noted During/After Treatment none  -BF     Row Name 22 1349          General Information    Patient Profile Reviewed yes  -BF     Patient/Family/Caregiver Comments/Observations Pt agreeable to OT; Pt underwent L midfoot amputation on 22. Pt has also been experiencing significant scrotal edema limiting his ability to  tolerate sitting/standing positions but pt reports scrotal discomfort has improved this morning w/ sling placement  -BF     Existing Precautions/Restrictions fall;non-weight bearing;left  NWB LLE, hernandez cath, < skin integrity  -BF     Row Name 06/03/22 1347          Previous Level of Function/Home Environm    BADLs, Premorbid Functional Level independent  -BF     Row Name 06/03/22 1347          Living Environment    Current Living Arrangements home  -BF     People in Home alone  Pt reports he will go home w/ sister at d/c  -BF     Primary Care Provided by self  -BF     Row Name 06/03/22 1347          Home Use of Assistive/Adaptive Equipment    Equipment Currently Used at Home none  -BF     Row Name 06/03/22 1347          Cognition/Psychosocial    Orientation Status (Cognition) oriented x 3  -BF     Follows Commands (Cognition) WFL  -BF     Row Name 06/03/22 1347          Range of Motion Comprehensive    General Range of Motion bilateral upper extremity ROM WFL  -BF     Row Name 06/03/22 1347          Strength Comprehensive (MMT)    Comment, General Manual Muscle Testing (MMT) Assessment BUE grossly 4/5  -BF     Row Name 06/03/22 1347          Vision Assessment/Intervention    Visual Impairment/Limitations WFL  -BF     Row Name 06/03/22 1347          Basic Activities of Daily Living (BADLs)    Basic Activities of Daily Living bathing;upper body dressing;lower body dressing;grooming;toileting;self-feeding  -BF     Row Name 06/03/22 1347          Bathing    Comment (Bathing) Max A  -BF     Row Name 06/03/22 1347          Upper Body Dressing    [place] Level (Upper Body Dressing) set up assistance;supervision;verbal cues  -BF     Position (Upper Body Dressing) supported sitting  -BF     Comment (Upper Body Dressing) Set-up/supervision  -BF     Row Name 06/03/22 1347          Lower Body Dressing    [place] Level (Lower Body Dressing) maximum assist (25% patient effort);verbal cues;nonverbal cues (demo/gesture)   -BF     Position (Lower Body Dressing) supine  -BF     Comment (Lower Body Dressing) Max A  -BF     Row Name 06/03/22 1347          Grooming    [place] Level (Grooming) set up;supervision;verbal cues  -BF     Position (Grooming) supported sitting  -BF     Comment (Grooming) Set-up/supervision  -BF     Row Name 06/03/22 1347          Toileting    Comment (Toileting) Total A- hernandez cath  -BF     Row Name 06/03/22 1347          Self-Feeding    Comment (Self-Feeding) Set-up  -BF     Row Name 06/03/22 1347          Transfer Assessment/Treatment    Transfers bed-chair transfer;chair-bed transfer  -BF     Row Name 06/03/22 1347          Transfers    Bed-Chair [place] (Transfers) minimum assist (75% patient effort);nonverbal cues (demo/gesture);verbal cues  -BF     Chair-Bed [place] (Transfers) minimum assist (75% patient effort);verbal cues;nonverbal cues (demo/gesture)  -BF     Assistive Device (Bed-Chair Transfers) wheelchair  -BF     Row Name 06/03/22 1347          Chair-Bed Transfer    Assistive Device (Chair-Bed Transfers) --  bed rails  -BF     Row Name 06/03/22 1347          Motor Skills    Motor Skills motor control/coordination interventions  -BF     Motor Control/Coordination Interventions gross motor coordination activities;fine motor manipulation/dexterity activities;therapeutic exercise/ROM  BUE GMC/FMC theract, strengthening; BUE PRE 15 mins; BUE flexbar, wrist rolls; RUE rickshaw 30 lbs X10X5; LUE rickshaw 20 lbs X10X5  -BF     Row Name 06/03/22 1347          Positioning and Restraints    Post Treatment Position wheelchair  -BF     In Bed supine;call light within reach;encouraged to call for assist;LLE elevated  In PM  -BF     In Wheelchair sitting;with PT  In AM  -BF     Row Name 06/03/22 1347          Therapy Assessment/Plan (OT)    Patient's Goals For Discharge take care of myself at home  -BF     Row Name 06/03/22 1347          Therapy Assessment/Plan (OT)    OT Diagnosis L midfoot  amputation  -BF     Rehab Potential/Prognosis (OT) good, to achieve stated therapy goals  -BF     Frequency of Treatment (OT) 5 times per week  -BF     Estimated Duration of Therapy (OT) 2 weeks  -BF     Problem List (OT) problems related to;balance;mobility;strength;pain  -BF     Activity Limitations Related to Problem List (OT) unable to transfer safely;BADLs not performed adequately or safely  -BF     Planned Therapy Interventions (OT) activity tolerance training;adaptive equipment training;BADL retraining;ROM/therapeutic exercise;strengthening exercise;transfer/mobility retraining  -     Row Name 06/03/22 4417          Therapy Plan Review/Discharge Plan (OT)    Therapy Plan Review (OT) patient  -BF     Anticipated Equipment Needs At Discharge (OT) --  TBD  -BF     Anticipated Discharge Disposition (OT) home with assist  -     Row Name 06/03/22 2817          IRF OT Goals    Bathing Goal Selection (OT-IRF) bathing, OT goal 1;bathing, OT goal 2  -BF     LB Dressing Goal Selection (OT-IRF) LB dressing, OT goal 1;LB dressing, OT goal 2  -     Row Name 06/03/22 134          Bathing Goal 1 (OT-IRF)    Activity/Device (Bathing Goal 1, OT-IRF) bathing skills, all  -BF     [place] Level (Bathing Goal 1, OT-IRF) moderate assist (50-74% patient effort)  -BF     Time Frame (Bathing Goal 1, OT-IRF) short-term goal (STG);1 week  -     Row Name 06/03/22 5605          Bathing Goal 2 (OT-IRF)    Activity/Device (Bathing Goal 2, OT-IRF) bathing skills, all  -BF     [place] Level (Bathing Goal 2, OT-IRF) minimum assist (75% or more patient effort)  -BF     Time Frame (Bathing Goal 2, OT-IRF) long-term goal (LTG);by discharge  -     Row Name 06/03/22 5369          LB Dressing Goal 1 (OT-IRF)    Activity/Device (LB Dressing Goal 1, OT-IRF) lower body dressing  -BF     Force (LB Dressing Goal 1, OT-IRF) moderate assist (50-74% patient effort)  -BF     Time Frame (LB Dressing Goal 1, OT-IRF) short-term goal  (STG);1 week  -BF     Row Name 06/03/22 1347          LB Dressing Goal 2 (OT-IRF)    Activity/Device (LB Dressing Goal 2, OT-IRF) lower body dressing  -BF     [place] (LB Dressing Goal 2, OT-IRF) minimum assist (75% or more patient effort)  -BF     Time Frame (LB Dressing Goal 2, OT-IRF) long-term goal (LTG);by discharge  -           User Key  (r) = Recorded By, (t) = Taken By, (c) = Cosigned By    Initials Name Effective Dates     Mandi Smith, OT 06/16/21 -                  Occupational Therapy Education                 Title: PT OT SLP Therapies (In Progress)     Topic: Occupational Therapy (Done)     Point: ADL training (Done)     Description:   Instruct learner(s) on proper safety adaptation and remediation techniques during self care or transfers.   Instruct in proper use of assistive devices.              Learning Progress Summary           Patient Acceptance, E, VU,NR by  at 6/3/2022 1347                   Point: Precautions (Done)     Description:   Instruct learner(s) on prescribed precautions during self-care and functional transfers.              Learning Progress Summary           Patient Acceptance, E, VU,NR by  at 6/3/2022 1347                               User Key     Initials Effective Dates Name Provider Type Discipline     06/16/21 -  Mandi Smith, OT Occupational Therapist OT                    OT Recommendation and Plan    Planned Therapy Interventions (OT): activity tolerance training, adaptive equipment training, BADL retraining, ROM/therapeutic exercise, strengthening exercise, transfer/mobility retraining                    Time Calculation:      Time Calculation- OT     Row Name 06/03/22 1406 06/03/22 0930          Time Calculation- OT    OT Start Time 1245  -BF 0930  -BF     OT Stop Time 1345  -BF 1000  -BF     OT Time Calculation (min) 60 min  -BF 30 min  -BF     Total Timed Code Minutes- OT 60 minute(s)  -BF 30 minute(s)  -BF     OT Non-Billable Time  (min) -- 10 min  -BF           User Key  (r) = Recorded By, (t) = Taken By, (c) = Cosigned By    Initials Name Provider Type    Mandi Saleem OT Occupational Therapist              Therapy Charges for Today     Code Description Service Date Service Provider Modifiers Qty    12843062084 HC OT EVAL HIGH COMPLEXITY 3 6/3/2022 Mandi Smith OT GO 1    11429947310 HC OT SELF CARE/MGMT/TRAIN EA 15 MIN 6/3/2022 Mandi Smith OT GO 1    [id number]  OT THER PROC EA 15 MIN 6/3/2022 Mandi Smith OT GO 2                   Mandi Smith OT  6/3/2022

## 2022-06-03 NOTE — PROGRESS NOTES
Patient Assessment Instrument  Quality Indicators - Admission    Section B. Hearing, Speech Vision      Section C. Cognitive Patterns      Section GA3677. Prior Functioning      Section MV8731. Prior Device Use      Section [id number]. Self Care Performance      Section QB4791. Self Care Discharge Goals      Section [id number]. Mobility Performance     Roll Left and Right: [place] provides verbal cues and/or touching/steadying  and/or contact guard assistance as patient completes activity. Assistance may be  provided throughout the activity or intermittently.   Sit to Lying: [place] provides verbal cues and/or touching/steadying and/or  contact guard assistance as patient completes activity. Assistance may be  provided throughout the activity or intermittently.   Lying to Sitting on Side of Bed: [place] provides verbal cues and/or  touching/steadying and/or contact guard assistance as patient completes  activity. Assistance may be provided throughout the activity or intermittently.   Sit to Stand: [place] does less than half the effort. [place] lifts, holds or  supports trunk or limbs but provides less than half the effort.   Chair/Bed to Chair Transfer: [place] does less than half the effort. [place]  lifts, holds or supports trunk or limbs but provides less than half the effort.   Toilet Transfer [place] does less than half the effort. [place] lifts, holds or  supports trunk or limbs but provides less than half the effort.   Car Transfer: [place] does less than half the effort. [place] lifts, holds or  supports trunk or limbs but provides less than half the effort.   Walk 10 Feet:   Not attempted due to medical or safety concerns.  1 Step Over Curb or Up/Down Stair:   Not attempted due to medical or safety  concerns.  Picking up an Object:   Not attempted due to medical or safety concerns.  Uses Wheelchair/Scooter: No    Section LM8222. Mobility Discharge Goals     Sit to Stand: [place] provides verbal cues or touching/steadying  assistance as  patient completes activity.   Chair/Bed to Chair Transfer: [place] provides verbal cues or touching/steadying  assistance as patient completes activity.   Walk Discharge Goals:   Walk 150 Feet: [place] provides verbal cues or touching/steadying assistance as  patient completes activity.    Section H. Bladder and Bowel      Section I. Active Diagnosis      Section J. Health Conditions      Section K. Swallowing/Nutritional Status      Section M. Skin Conditions      Section N. Medication      Section O. Special Treatments, Procedures, and Programs      OPTIONAL BRANCH FOR TRACKING FALLS  Fall(s) During Shift:    Signed by: Willow Villalta, Physical Therapist

## 2022-06-03 NOTE — PLAN OF CARE
Goal Outcome EVALUATION      Patient resting up in bed, having breakfast, no voiced c/o at this time, will continue to monitor.

## 2022-06-03 NOTE — THERAPY EVALUATION
Inpatient Rehabilitation - Physical Therapy Initial Evaluation        [place]     Patient Name: Melchor Hardwick  : 1965  MRN: [id number]    Today's Date: 6/3/2022                    Admit Date: 2022      Visit Dx:   No diagnosis found.    Patient Active Problem List   Diagnosis   • Hyperlipidemia   • Hypertensive disorder   • Kidney disease   • Obesity   • Type 2 diabetes mellitus (HCC)   • Gas gangrene of foot (HCC)   • Cellulitis in diabetic foot (HCC)   • Other acute osteomyelitis of left foot (HCC)   • Osteomyelitis (HCC)   • Critical illness myopathy       Past Medical History:   Diagnosis Date   • Diabetes mellitus (HCC)    • Elevated cholesterol    • Hyperlipidemia    • Hypertension        Past Surgical History:   Procedure Laterality Date   • ABSCESS DRAINAGE      PERINEUM   • AMPUTATION FOOT Left 2022    Procedure: AMPUTATION FOOT;  Surgeon: Addison Colby MD;  Location: [facility];  Service: Podiatry;  Laterality: Left;   • INCISION AND DRAINAGE LEG Left 05/10/2022    Procedure: INCISION AND DRAINAGE LOWER EXTREMITY;  Surgeon: Addison Colby MD;  Location: [facility];  Service: Podiatry;  Laterality: Left;   • WOUND DEBRIDEMENT Left 2022    Procedure: DEBRIDEMENT FOOT;  Surgeon: Addison Colby MD;  Location: [facility];  Service: Podiatry;  Laterality: Left;       PT ASSESSMENT (last 12 hours)     IRF PT Evaluation and Treatment     Row Name 22 5484          PT Time and Intention    Document Type initial evaluation;daily treatment  -LB     Mode of Treatment individual therapy;physical therapy  -LB     Row Name 22 8542          General Information    Patient Profile Reviewed yes  -LB     General Observations of Patient left foot splinted and wrapped, widespread edema from waist distally, including c/o scrotal edema  -LB     Existing Precautions/Restrictions fall;non-weight bearing  hernandez, <skin integrity L foot  -LB     Row Name 22 5642          Pain Scale: FACES  Pre/Post-Treatment    Pain: FACES Scale, Pretreatment 6-->hurts even more  -LB     Posttreatment Pain Rating 6-->hurts even more  -LB     Pain Location - --  left foot, reports phantom pain  -LB     Pre/Posttreatment Pain Comment rest, repositioned  -LB     Row Name 06/03/22 1355          Cognition/Psychosocial    Affect/Mental Status (Cognition) WFL  -LB     Follows Commands (Cognition) WFL  -LB     Personal Safety Interventions gait belt;nonskid shoes/slippers when out of bed;supervised activity  -LB     Row Name 06/03/22 1355          Mobility    Left Lower Extremity (Weight-bearing Status) non weight-bearing (NWB)  -LB     Right Lower Extremity (Weight-bearing Status) weight-bearing as tolerated (WBAT)  -LB     Row Name 06/03/22 1355          Bed Mobility    Supine-Sit-Supine [place] (Bed Mobility) contact guard;verbal cues;nonverbal cues (demo/gesture)  -LB     Row Name 06/03/22 1355          Transfers    Bed-Chair [place] (Transfers) minimum assist (75% patient effort);verbal cues;nonverbal cues (demo/gesture)  -LB     Chair-Bed [place] (Transfers) minimum assist (75% patient effort);verbal cues;nonverbal cues (demo/gesture)  -LB     Assistive Device (Bed-Chair Transfers) wheelchair;walker, front-wheeled  -LB     Sit-Stand [place] (Transfers) minimum assist (75% patient effort);verbal cues;nonverbal cues (demo/gesture)  -LB     Stand-Sit [place] (Transfers) minimum assist (75% patient effort);verbal cues;nonverbal cues (demo/gesture)  -LB     Row Name 06/03/22 1355          Chair-Bed Transfer    Assistive Device (Chair-Bed Transfers) wheelchair;walker, front-wheeled  -LB     Row Name 06/03/22 1355          Sit-Stand Transfer    Assistive Device (Sit-Stand Transfers) walker, front-wheeled;parallel bars  -LB     Row Name 06/03/22 1355          Stand-Sit Transfer    Assistive Device (Stand-Sit Transfers) walker, front-wheeled;parallel bars  -LB     Row Name 06/03/22 [facility]5           Gait/Stairs (Locomotion)    Comment, (Gait/Stairs) To amb he will have to hop due to NWB on L. He did not amb today but worked on standing and transfers.  -LB     Row Name 06/03/22 [facility]          Safety Issues, Functional Mobility    Safety Issues Affecting Function (Mobility) --  he is compliant with NWB status  -LB     Impairments Affecting Function (Mobility) balance;endurance/activity tolerance;pain;strength  -LB     Row Name 06/03/22 [facility]          Balance    Static Standing Balance --  SBA in PB, CGA with RW  -LB     Comment, Balance sitting bean bag toss at edge of mat and  with reacher 2x  -LB     Row Name 06/03/22 [facility]          Motor Skills    Therapeutic Exercise --  sitting ex 2 sets, supine ex  -LB     Row Name 06/03/22 [facility]          Positioning and Restraints    Pre-Treatment Position sitting in chair/recliner  -LB     Post Treatment Position wheelchair  -LB     In Wheelchair call light within reach;encouraged to call for assist;legs elevated  -LB     Row Name 06/03/22 [facility]7          Therapy Assessment/Plan (PT)    Patient's Goals For Discharge return home  -LB     Row Name 06/03/22 [facility]6          Therapy Assessment/Plan (PT)    PT Diagnosis (PT) s/p L midfoot amputation, edema including the scrotum  -LB     Rehab Potential/Prognosis (PT) adequate, monitor progress closely  -LB     Frequency of Treatment (PT) 5 times per week  -LB     Estimated Duration of Therapy (PT) 1 week;2 weeks  -LB     Problem List (PT) balance;mobility;range of motion (ROM);strength;pain  -LB     Activity Limitations Related to Problem List (PT) unable to ambulate safely;unable to transfer safely  -LB     Row Name 06/03/22 [facility]6          Therapy Plan Review/Discharge Plan (PT)    Therapy Plan Review (PT) evaluation/treatment results reviewed;care plan/treatment goals reviewed;risks/benefits reviewed;participants voiced agreement with care plan  -LB     Anticipated Equipment Needs at Discharge (PT Eval) --  tbd  -LB      Anticipated Discharge Disposition (PT) home with home health  -LB     Row Name 06/03/22 1355          IRF PT Goals    Bed Mobility Goal Selection (PT-IRF) bed mobility, PT goal 1  -LB     Transfer Goal Selection (PT-IRF) transfers, PT goal 1  -LB     Gait (Walking Locomotion) Goal Selection (PT-IRF) gait, PT goal 1  -LB     Row Name 06/03/22 1355          Bed Mobility Goal 1 (PT-IRF)    Activity/Assistive Device (Bed Mobility Goal 1, PT-IRF) sit to supine/supine to sit  -LB     [place] Level (Bed Mobility Goal 1, PT-IRF) modified independence  -LB     Time Frame (Bed Mobility Goal 1, PT-IRF) by discharge  -LB     Row Name 06/03/22 1355          Transfer Goal 1 (PT-IRF)    Activity/Assistive Device (Transfer Goal 1, PT-IRF) sit-to-stand/stand-to-sit;bed-to-chair/chair-to-bed  -LB     [place] Level (Transfer Goal 1, PT-IRF) supervision required  -LB     Time Frame (Transfer Goal 1, PT-IRF) by discharge  -LB     Row Name 06/03/22 3349          Gait/Walking Locomotion Goal 1 (PT-IRF)    Activity/Assistive Device (Gait/Walking Locomotion Goal 1, PT-IRF) walker, rolling  -LB     Gait/Walking Locomotion Distance Goal 1 (PT-IRF) 2'  -LB     [place] Level (Gait/Walking Locomotion Goal 1, PT-IRF) supervision required  -LB     Time Frame (Gait/Walking Locomotion Goal 1, PT-IRF) by discharge  -LB           User Key  (r) = Recorded By, (t) = Taken By, (c) = Cosigned By    Initials Name Provider Type    LB Willow Villalta, PT Physical Therapist              Wound 05/16/22 1340 coccyx Pressure Injury (Active)   Wound Image   06/02/22 1640   Pressure Injury Stage O 06/02/22 1640   Dressing Appearance open to air 06/03/22 0836   Closure None 06/03/22 0836   Base blanchable;red 06/03/22 0836   Periwound blanchable;redness 06/03/22 0836   Periwound Temperature warm 06/03/22 0836   Periwound Skin Turgor soft 06/03/22 0836   Edges irregular 06/03/22 0836   Drainage Amount none 06/03/22 0836   Care, Wound barrier  applied 06/03/22 0836   Dressing Care open to air 06/03/22 0836   Periwound Care barrier ointment applied 06/03/22 0836       Wound 05/18/22 1545 Left other (see comments) foot Incision (Active)   Dressing Appearance dry;intact 06/03/22 0836   Closure AGATHA 06/03/22 0836   Base dressing in place, unable to visualize 06/03/22 0836   Periwound intact 06/03/22 0836   Periwound Temperature warm 06/03/22 0836   Periwound Skin Turgor firm 06/03/22 0836   Drainage Characteristics/Odor bleeding controlled 06/03/22 0836   Drainage Amount none 06/03/22 0836   Dressing Care dressing reinforced 06/03/22 0836       Wound 06/02/22 1240 scrotum MASD (Moisture associated skin damage) (Active)   Dressing Appearance open to air 06/03/22 0836   Closure Open to air 06/03/22 0836   Base red;moist 06/03/22 0836     Physical Therapy Education                 Title: PT OT SLP Therapies (Done)     Topic: Physical Therapy (Done)     Point: Mobility training (Done)     Learning Progress Summary           Patient Acceptance, E, VU,NR by  at 6/3/2022 1430                   Point: Home exercise program (Done)     Learning Progress Summary           Patient Acceptance, E, VU,NR by LB at 6/3/2022 1430                   Point: Body mechanics (Done)     Learning Progress Summary           Patient Acceptance, E, VU,NR by LB at 6/3/2022 1430                   Point: Precautions (Done)     Learning Progress Summary           Patient Acceptance, E, VU,NR by LB at 6/3/2022 1430                               User Key     Initials Effective Dates Name Provider Type Discipline     06/16/21 -  Willow Villalta, PT Physical Therapist PT                PT Recommendation and Plan    Planned Therapy Interventions (PT): balance training, bed mobility training, gait training, patient/family education, ROM (range of motion), strengthening, transfer training, wheelchair management/propulsion training  Frequency of Treatment (PT): 5 times per week  Anticipated  Equipment Needs at Discharge (PT Eval):  (tbd)                  Time Calculation:      PT Charges     Row Name 06/03/22 1430             Time Calculation    Start Time 1000  -LB      Stop Time 1130  -LB      Time Calculation (min) 90 min  -LB      PT Received On 06/03/22  -LB      PT Goal Re-Cert Due Date 06/10/22  -LB            User Key  (r) = Recorded By, (t) = Taken By, (c) = Cosigned By    Initials Name Provider Type    LB Willow Villalta, PT Physical Therapist                Therapy Charges for Today     Code Description Service Date Service Provider Modifiers Qty    10496541649 HC PT EVAL LOW COMPLEXITY 3 6/3/2022 Willow Villalta, PT GP 1    06690352298 HC PT THER PROC EA 15 MIN 6/3/2022 Willow Villalta, PT GP 3                   Willow Villalta, PT  6/3/2022

## 2022-06-04 LAB
ALBUMIN SERPL-MCNC: 1.89 G/DL (ref 3.5–5.2)
ALBUMIN/GLOB SERPL: 0.5 G/DL
ALP SERPL-CCNC: 293 U/L (ref 39–117)
ALT SERPL W P-5'-P-CCNC: 37 U/L (ref 1–41)
ANION GAP SERPL CALCULATED.3IONS-SCNC: 6.9 MMOL/L (ref 5–15)
AST SERPL-CCNC: 47 U/L (ref 1–40)
BASOPHILS # BLD AUTO: 0.02 10*3/MM3 (ref 0–0.2)
BASOPHILS NFR BLD AUTO: 0.4 % (ref 0–1.5)
BILIRUB SERPL-MCNC: 0.4 MG/DL (ref 0–1.2)
BUN SERPL-MCNC: 21 MG/DL (ref 6–20)
BUN/CREAT SERPL: 28.8 (ref 7–25)
CALCIUM SPEC-SCNC: 8.1 MG/DL (ref 8.6–10.5)
CHLORIDE SERPL-SCNC: 104 MMOL/L (ref 98–107)
CO2 SERPL-SCNC: 23.1 MMOL/L (ref 22–29)
CREAT SERPL-MCNC: 0.73 MG/DL (ref 0.76–1.27)
DEPRECATED RDW RBC AUTO: 49.6 FL (ref 37–54)
EGFRCR SERPLBLD CKD-EPI 2021: 106.1 ML/MIN/1.73
EOSINOPHIL # BLD AUTO: 0.19 10*3/MM3 (ref 0–0.4)
EOSINOPHIL NFR BLD AUTO: 3.4 % (ref 0.3–6.2)
ERYTHROCYTE [DISTWIDTH] IN BLOOD BY AUTOMATED COUNT: 17.2 % (ref 12.3–15.4)
GLOBULIN UR ELPH-MCNC: 3.9 GM/DL
GLUCOSE BLDC GLUCOMTR-MCNC: 138 MG/DL (ref 70–130)
GLUCOSE BLDC GLUCOMTR-MCNC: 236 MG/DL (ref 70–130)
GLUCOSE BLDC GLUCOMTR-MCNC: 90 MG/DL (ref 70–130)
GLUCOSE SERPL-MCNC: 86 MG/DL (ref 65–99)
HCT VFR BLD AUTO: 31.8 % (ref 37.5–51)
HGB BLD-MCNC: 10.1 G/DL (ref 13–17.7)
IMM GRANULOCYTES # BLD AUTO: 0.01 10*3/MM3 (ref 0–0.05)
IMM GRANULOCYTES NFR BLD AUTO: 0.2 % (ref 0–0.5)
LYMPHOCYTES # BLD AUTO: 1.85 10*3/MM3 (ref 0.7–3.1)
LYMPHOCYTES NFR BLD AUTO: 33.2 % (ref 19.6–45.3)
MCH RBC QN AUTO: 25.1 PG (ref 26.6–33)
MCHC RBC AUTO-ENTMCNC: 31.8 G/DL (ref 31.5–35.7)
MCV RBC AUTO: 79.1 FL (ref 79–97)
MONOCYTES # BLD AUTO: 0.53 10*3/MM3 (ref 0.1–0.9)
MONOCYTES NFR BLD AUTO: 9.5 % (ref 5–12)
NEUTROPHILS NFR BLD AUTO: 2.98 10*3/MM3 (ref 1.7–7)
NEUTROPHILS NFR BLD AUTO: 53.3 % (ref 42.7–76)
NRBC BLD AUTO-RTO: 0 /100 WBC (ref 0–0.2)
PLATELET # BLD AUTO: 140 10*3/MM3 (ref 140–450)
PMV BLD AUTO: 9.9 FL (ref 6–12)
POTASSIUM SERPL-SCNC: 3.3 MMOL/L (ref 3.5–5.2)
POTASSIUM SERPL-SCNC: 3.8 MMOL/L (ref 3.5–5.2)
PROT SERPL-MCNC: 5.8 G/DL (ref 6–8.5)
RBC # BLD AUTO: 4.02 10*6/MM3 (ref 4.14–5.8)
SODIUM SERPL-SCNC: 134 MMOL/L (ref 136–145)
VANCOMYCIN TROUGH SERPL-MCNC: 18 MCG/ML (ref 5–20)
WBC NRBC COR # BLD: 5.58 10*3/MM3 (ref 3.4–10.8)

## 2022-06-04 PROCEDURE — 99231 SBSQ HOSP IP/OBS SF/LOW 25: CPT | Performed by: FAMILY MEDICINE

## 2022-06-04 PROCEDURE — 63710000001 INSULIN ASPART PER 5 UNITS: Performed by: FAMILY MEDICINE

## 2022-06-04 PROCEDURE — 85025 COMPLETE CBC W/AUTO DIFF WBC: CPT | Performed by: FAMILY MEDICINE

## 2022-06-04 PROCEDURE — 25010000002 VANCOMYCIN 5 G RECONSTITUTED SOLUTION: Performed by: FAMILY MEDICINE

## 2022-06-04 PROCEDURE — 82962 GLUCOSE BLOOD TEST: CPT

## 2022-06-04 PROCEDURE — 97530 THERAPEUTIC ACTIVITIES: CPT

## 2022-06-04 PROCEDURE — 63710000001 INSULIN DETEMIR PER 5 UNITS: Performed by: FAMILY MEDICINE

## 2022-06-04 PROCEDURE — 80202 ASSAY OF VANCOMYCIN: CPT

## 2022-06-04 PROCEDURE — 97535 SELF CARE MNGMENT TRAINING: CPT

## 2022-06-04 PROCEDURE — 80053 COMPREHEN METABOLIC PANEL: CPT | Performed by: FAMILY MEDICINE

## 2022-06-04 PROCEDURE — 97110 THERAPEUTIC EXERCISES: CPT

## 2022-06-04 PROCEDURE — 84132 ASSAY OF SERUM POTASSIUM: CPT | Performed by: FAMILY MEDICINE

## 2022-06-04 PROCEDURE — 25010000002 ENOXAPARIN PER 10 MG: Performed by: FAMILY MEDICINE

## 2022-06-04 RX ORDER — POTASSIUM CHLORIDE 7.45 MG/ML
10 INJECTION INTRAVENOUS
Status: DISCONTINUED | OUTPATIENT
Start: 2022-06-04 | End: 2022-06-16 | Stop reason: HOSPADM

## 2022-06-04 RX ORDER — POTASSIUM CHLORIDE 1.5 G/1.77G
40 POWDER, FOR SOLUTION ORAL AS NEEDED
Status: DISCONTINUED | OUTPATIENT
Start: 2022-06-04 | End: 2022-06-16 | Stop reason: HOSPADM

## 2022-06-04 RX ORDER — POTASSIUM CHLORIDE 20 MEQ/1
40 TABLET, EXTENDED RELEASE ORAL EVERY 4 HOURS
Status: COMPLETED | OUTPATIENT
Start: 2022-06-04 | End: 2022-06-04

## 2022-06-04 RX ORDER — POTASSIUM CHLORIDE 750 MG/1
40 CAPSULE, EXTENDED RELEASE ORAL AS NEEDED
Status: DISCONTINUED | OUTPATIENT
Start: 2022-06-04 | End: 2022-06-16 | Stop reason: HOSPADM

## 2022-06-04 RX ADMIN — NYSTATIN: 100000 POWDER TOPICAL at 09:06

## 2022-06-04 RX ADMIN — INSULIN ASPART 15 UNITS: 100 INJECTION, SOLUTION INTRAVENOUS; SUBCUTANEOUS at 12:07

## 2022-06-04 RX ADMIN — PANTOPRAZOLE SODIUM 40 MG: 40 TABLET, DELAYED RELEASE ORAL at 05:28

## 2022-06-04 RX ADMIN — ASPIRIN 81 MG: 81 TABLET, COATED ORAL at 09:02

## 2022-06-04 RX ADMIN — NYSTATIN: 100000 POWDER TOPICAL at 20:35

## 2022-06-04 RX ADMIN — Medication 1 TABLET: at 09:03

## 2022-06-04 RX ADMIN — Medication 10 ML: at 20:10

## 2022-06-04 RX ADMIN — METFORMIN HYDROCHLORIDE 500 MG: 500 TABLET ORAL at 18:24

## 2022-06-04 RX ADMIN — POTASSIUM CHLORIDE 40 MEQ: 20 TABLET, EXTENDED RELEASE ORAL at 10:33

## 2022-06-04 RX ADMIN — SPIRONOLACTONE 50 MG: 25 TABLET ORAL at 09:03

## 2022-06-04 RX ADMIN — ENOXAPARIN SODIUM 40 MG: 40 INJECTION SUBCUTANEOUS at 20:09

## 2022-06-04 RX ADMIN — NADOLOL 20 MG: 40 TABLET ORAL at 09:03

## 2022-06-04 RX ADMIN — VANCOMYCIN HYDROCHLORIDE 1750 MG: 5 INJECTION, POWDER, LYOPHILIZED, FOR SOLUTION INTRAVENOUS at 11:10

## 2022-06-04 RX ADMIN — FUROSEMIDE 40 MG: 40 TABLET ORAL at 20:09

## 2022-06-04 RX ADMIN — INSULIN ASPART 3 UNITS: 100 INJECTION, SOLUTION INTRAVENOUS; SUBCUTANEOUS at 12:06

## 2022-06-04 RX ADMIN — VANCOMYCIN HYDROCHLORIDE 1750 MG: 5 INJECTION, POWDER, LYOPHILIZED, FOR SOLUTION INTRAVENOUS at 20:36

## 2022-06-04 RX ADMIN — INSULIN DETEMIR 45 UNITS: 100 INJECTION, SOLUTION SUBCUTANEOUS at 08:59

## 2022-06-04 RX ADMIN — Medication 10 ML: at 09:07

## 2022-06-04 RX ADMIN — FUROSEMIDE 40 MG: 40 TABLET ORAL at 09:05

## 2022-06-04 RX ADMIN — METFORMIN HYDROCHLORIDE 500 MG: 500 TABLET ORAL at 09:02

## 2022-06-04 RX ADMIN — INSULIN ASPART 15 UNITS: 100 INJECTION, SOLUTION INTRAVENOUS; SUBCUTANEOUS at 18:24

## 2022-06-04 RX ADMIN — HYDROCODONE BITARTRATE AND ACETAMINOPHEN 1 TABLET: 7.5; 325 TABLET ORAL at 20:09

## 2022-06-04 RX ADMIN — ATORVASTATIN CALCIUM 40 MG: 40 TABLET, FILM COATED ORAL at 20:09

## 2022-06-04 RX ADMIN — POTASSIUM CHLORIDE 40 MEQ: 20 TABLET, EXTENDED RELEASE ORAL at 14:56

## 2022-06-04 RX ADMIN — DOCUSATE SODIUM 50 MG AND SENNOSIDES 8.6 MG 2 TABLET: 8.6; 5 TABLET, FILM COATED ORAL at 09:03

## 2022-06-04 RX ADMIN — DOCUSATE SODIUM 50 MG AND SENNOSIDES 8.6 MG 2 TABLET: 8.6; 5 TABLET, FILM COATED ORAL at 20:09

## 2022-06-04 NOTE — THERAPY TREATMENT NOTE
Inpatient Rehabilitation - Physical Therapy Treatment Note   [facility]     Patient Name: Melchor Hardwick  : 1965  MRN: [id number]  Today's Date: 2022      Visit Dx:   No diagnosis found.  Patient Active Problem List   Diagnosis   • Hyperlipidemia   • Hypertensive disorder   • Kidney disease   • Obesity   • Type 2 diabetes mellitus (HCC)   • Gas gangrene of foot (HCC)   • Cellulitis in diabetic foot (HCC)   • Other acute osteomyelitis of left foot (HCC)   • Osteomyelitis (HCC)   • Critical illness myopathy     Past Medical History:   Diagnosis Date   • Diabetes mellitus (HCC)    • Elevated cholesterol    • Hyperlipidemia    • Hypertension      Past Surgical History:   Procedure Laterality Date   • ABSCESS DRAINAGE      PERINEUM   • AMPUTATION FOOT Left 2022    Procedure: AMPUTATION FOOT;  Surgeon: Addison Colby MD;  Location: [facility];  Service: Podiatry;  Laterality: Left;   • INCISION AND DRAINAGE LEG Left 05/10/2022    Procedure: INCISION AND DRAINAGE LOWER EXTREMITY;  Surgeon: Addison Colby MD;  Location: [facility];  Service: Podiatry;  Laterality: Left;   • WOUND DEBRIDEMENT Left 2022    Procedure: DEBRIDEMENT FOOT;  Surgeon: Addison Colby MD;  Location: [facility];  Service: Podiatry;  Laterality: Left;     PT Assessment (last 12 hours)     PT Evaluation and Treatment    No documentation.                 Physical Therapy Education                 Title: PT OT SLP Therapies (Done)     Topic: Physical Therapy (Done)     Point: Mobility training (Done)     Learning Progress Summary           Patient Eager, E,TB, DU,VU by HR at 2022 1157    Acceptance, E, VU,NR by LB at 6/3/2022 1430                   Point: Home exercise program (Done)     Learning Progress Summary           Patient Eager, E,TB, DU,VU by HR at 2022 1157    Acceptance, E, VU,NR by LB at 6/3/2022 1430                   Point: Body mechanics (Done)     Learning Progress Summary           Patient Eager, E,TB, DU,VU  by HR at 6/4/2022 1157    Acceptance, E, VU,NR by LB at 6/3/2022 1430                   Point: Precautions (Done)     Learning Progress Summary           Patient Eager, E,TB, DU,VU by HR at 6/4/2022 1157    Acceptance, E, VU,NR by LB at 6/3/2022 1430                               User Key     Initials Effective Dates Name Provider Type Discipline    LB 06/16/21 -  Willow Villalta, PT Physical Therapist PT    HR 01/14/22 -  Hanna Murphy PTA Physical Therapist Assistant PT              PT Recommendation and Plan             Time Calculation:    PT Charges     Row Name 06/04/22 1157             Time Calculation    Start Time 0915  -HR      Stop Time 1045  -HR      Time Calculation (min) 90 min  -HR      PT Received On 06/04/22  -HR              Time Calculation- PT    Total Timed Code Minutes- PT 90 minute(s)  -HR            User Key  (r) = Recorded By, (t) = Taken By, (c) = Cosigned By    Initials Name Provider Type    HR Hanna Murphy PTA Physical Therapist Assistant              Therapy Charges for Today     Code Description Service Date Service Provider Modifiers Qty    01093258957 HC PT THER PROC EA 15 MIN 6/4/2022 Hanna Murphy PTA GP, CQ 3    71746662177 HC PT THERAPEUTIC ACT EA 15 MIN 6/4/2022 Hanna Murphy PTA GP, CQ 3               Hanna Murphy PTA  6/4/2022

## 2022-06-04 NOTE — PROGRESS NOTES
[facility]  PROGRESS NOTE     Patient Identification:  Name:  Melchor Hardwick  Age:  57 y.o.  Sex:  male  :  1965  MRN:  [id number]  Visit Number:  [id number]  ROOM: 109/     Primary Care Provider:  Missy Up APRN    Length of stay in inpatient status:  2    Subjective     Chief Compliant:  No chief complaint on file.      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  Patient also does have general lysed edema with scrotal edema, cirrhosis secondary to [place], diabetes mellitus, anemia, hypertension, hyperlipidemia and obesity.  Patient states that the scrotal swelling seems to be slightly improved with increase in Lasix therapy yesterday.  Still has significant edema in the lower extremities.  No new complaints otherwise patient states that he is happy to be doing his therapy at this time    Objective     Current Hospital Meds:aspirin, 81 mg, Oral, Daily  atorvastatin, 40 mg, Oral, Nightly  enoxaparin, 40 mg, Subcutaneous, Nightly  furosemide, 40 mg, Oral, BID  Insulin Aspart, 0-7 Units, Subcutaneous, TID AC  Insulin Aspart, 15 Units, Subcutaneous, TID AC  insulin detemir, 45 Units, Subcutaneous, Daily  metFORMIN, 500 mg, Oral, BID With Meals  multivitamin, 1 tablet, Oral, Daily  nadolol, 20 mg, Oral, Q24H  nystatin, , Topical, Q12H  pantoprazole, 40 mg, Oral, Q AM  potassium chloride, 40 mEq, Oral, Q4H  senna-docusate sodium, 2 tablet, Oral, BID  sodium chloride, 10 mL, Intravenous, Q12H  sodium chloride, 10 mL, Intravenous, Q12H  spironolactone, 50 mg, Oral, Daily  vancomycin, 1,750 mg, Intravenous, Q12H       ----------------------------------------------------------------------------------------------------------------------  Vital Signs:  Temp:  [98.4 °F (36.9 °C)-98.7 °F (37.1 °C)] 98.7 °F (37.1  °C)  Heart Rate:  [73-81] 81  Resp:  [20] 20  BP: (121-132)/(63-69) 132/69  SpO2:  [96 %] 96 %  on   ;   Device (Oxygen Therapy): room air  Body mass index is 37.39 kg/m².    Wt Readings from Last 3 Encounters:   06/04/22 118 kg (260 lb 9.6 oz)   06/02/22 117 kg (258 lb)     Intake & Output (last 3 days)       06/01 0701 06/02 0700 06/02 0701 06/03 0700 06/03 0701 06/04 0700 06/04 0701 06/05 0700    P.O.  480 1800 480    I.V. (mL/kg)  500 (4.3) 500 (4.2)     IV Piggyback   1000     Total Intake(mL/kg)  980 (8.4) 3300 (28) 480 (4.1)    Urine (mL/kg/hr)  1300 4450 (1.6)     Stool   0     Total Output  1300 4450     Net  -320 -1150 +480            Stool Unmeasured Occurrence   1 x         Diet Regular; Cardiac, Consistent Carbohydrate, Daily Fluid Restriction; 1500 mL Fluid Per Day; High Protein  ----------------------------------------------------------------------------------------------------------------------  Physical exam:  Constitutional:   Overweight gentleman in no distress  HEENT: Normocephalic atraumatic  Neck: Supple   Cardiovascular: Regular rate and rhythm  Pulmonary/Chest: Clear to auscultation  Abdominal: Positive bowel sounds soft.   Musculoskeletal: Left foot is dressed  Neurological: No focal deficits  Skin: No rash  Peripheral vascular: 1-2+ edema in lower extremities; still has significant scrotal edema but improved  Genitourinary:  ----------------------------------------------------------------------------------------------------------------------    Last echocardiogram:  Results for orders placed during the hospital encounter of 05/10/22    Adult Transthoracic Echo Complete W/ Cont if Necessary Per Protocol    Interpretation Summary  · Normal left ventricular cavity size and wall thickness noted. All left ventricular wall segments contract normally.  · Left ventricular ejection fraction appears to be 61 - 65%.  · The mitral valve is structurally normal with no significant stenosis present.  Trace mitral valve regurgitation is present.  · The aortic valve is structurally normal with no regurgitation or stenosis present.  · There is no evidence of pericardial effusion.    ----------------------------------------------------------------------------------------------------------------------  Results from last 7 days   Lab Units 06/04/22  0030 05/30/22 0628   WBC 10*3/mm3 5.58 3.63   HEMOGLOBIN g/dL 10.1* 9.1*   HEMATOCRIT % 31.8* 28.1*   MCV fL 79.1 78.5*   MCHC g/dL 31.8 32.4   PLATELETS 10*3/mm3 140 120*   INR   --  1.04         Results from last 7 days   Lab Units 06/04/22  0030 06/01/22  0452 05/31/22  1140 05/30/22  0628 05/29/22  0729   SODIUM mmol/L 134* 134* 133* 136 137   POTASSIUM mmol/L 3.3* 3.7 3.9 4.1 4.0   MAGNESIUM mg/dL  --   --  1.9  --  2.0   CHLORIDE mmol/L 104 103 103 103 103   CO2 mmol/L 23.1 24.3 23.7 24.7 27.0   BUN mg/dL 21* 19 20 15 16   CREATININE mg/dL 0.73* 0.70* 0.78 0.65* 0.64*   CALCIUM mg/dL 8.1* 7.8* 7.7* 7.8* 7.9*   GLUCOSE mg/dL 86 174* 232* 197* 116*   ALBUMIN g/dL 1.89*  --   --  1.92*  --    BILIRUBIN mg/dL 0.4  --   --  0.3  --    ALK PHOS U/L 293*  --   --  337*  --    AST (SGOT) U/L 47*  --   --  67*  --    ALT (SGPT) U/L 37  --   --  43*  --    Estimated Creatinine Clearance: 143.7 mL/min (A) (by C-G formula based on SCr of 0.73 mg/dL (L)).  No results found for: AMMONIA  Results from last 7 days   Lab Units 05/30/22  0628   CK TOTAL U/L 36             Glucose   Date/Time Value Ref Range Status   06/04/2022 0643 90 70 - 130 mg/dL Final     Comment:     Meter: [id number] : [id number] Raul Wu   06/03/2022 1940 181 (H) 70 - 130 mg/dL Final     Comment:     Meter: [id number] : [id number] Raul Wu   06/03/2022 1642 182 (H) 70 - 130 mg/dL Final     Comment:     Meter: [id number] : [id number] leonie lino   06/03/2022 1145 259 (H) 70 - 130 mg/dL Final     Comment:     Meter: [id number] : [id number] Penelope Dawson   06/03/2022 0750  248 (H) 70 - 130 mg/dL Final     Comment:     Meter: [id number] : [id number] Penelope Dawson   06/02/2022 1946 253 (H) 70 - 130 mg/dL Final     Comment:     Meter: [id number] : [id number] Rayshawn Wu   06/02/2022 1721 244 (H) 70 - 130 mg/dL Final     Comment:     Meter: [id number] : [id number] MICHAEL OWENS   06/02/2022 1026 252 (H) 70 - 130 mg/dL Final     Comment:     Meter: [id number] : [id number] rayshawn katt     Lab Results   Component Value Date    TSH 4.700 (H) 05/30/2022    FREET4 0.87 (L) 05/30/2022     No results found for: PREGTESTUR, PREGSERUM, HCG, HCGQUANT  Pain Management Panel     Pain Management Panel Latest Ref Rng & Units 5/24/2022 5/11/2022    CREATININE UR mg/dL 91.0 -    AMPHETAMINES SCREEN, URINE Negative - Negative    BARBITURATES SCREEN Negative - Negative    BENZODIAZEPINE SCREEN, URINE Negative - Negative    BUPRENORPHINEUR Negative - Negative    COCAINE SCREEN, URINE Negative - Negative    METHADONE SCREEN, URINE Negative - Negative    METHAMPHETAMINEUR Negative - Negative        Brief Urine Lab Results  (Last result in the past 365 days)      Color   Clarity   Blood   Leuk Est   Nitrite   Protein   CREAT   Urine HCG        05/24/22 1839             91.0             No results found for: BLOODCX      No results found for: URINECX  No results found for: WOUNDCX  No results found for: STOOLCX        I have personally looked at the labs and they are summarized above.  ----------------------------------------------------------------------------------------------------------------------  Detailed radiology reports for the last 24 hours:    Imaging Results (Last 24 Hours)     ** No results found for the last 24 hours. **        Final impressions for the last 30 days of radiology reports:    CT Abdomen Pelvis Without Contrast    Result Date: 5/25/2022  1.  Advanced liver cirrhosis with changes of portal hypertension which include spinal megaly, prominent portosystemic  collateral vessels, small-moderate volume ascites, and marked anasarca. 2.  Moderate constipation and mild generalized ileus. No bowel obstruction. 3.  Miniscule pleural effusions. 4.  Other incidental and nonacute findings detailed above.  This report was finalized on 5/25/2022 8:48 AM by Dr. Brain Rodriguez MD.      XR Foot 3+ View Left    Result Date: 5/10/2022    Degenerative change of the midfoot.  This report was finalized on 5/10/2022 4:17 PM by Dr. Oswaldo Brown MD.      US Abdomen Complete    Result Date: 5/20/2022  1. Splenomegaly. 2. Small volume ascites.  This report was finalized on 5/20/2022 12:59 PM by Dr. Lobo Ring MD.      US Scrotum & Testicles    Result Date: 5/20/2022  Extensive scrotal wall swelling.   This report was finalized on 5/20/2022 12:59 PM by Dr. Lobo Ring MD.      US Scrotum & Testicles    Result Date: 5/16/2022  Scrotal wall thickening  This report was finalized on 5/16/2022 4:57 PM by Dr. Lobo Ring MD.      US Arterial Doppler Lower Extremity Left    Result Date: 5/10/2022  Abnormal examination demonstrating elevated flow velocities throughout the left lower extremity arterial system and biphasic flow within the left posterior tibial artery. Contemporaneously performed CT demonstrates imaging findings highly suspicious for gas gangrene and infection/osteomyelitis. CT dictated separately. Signer Name: Oscar Dixon MD  Signed: 5/10/2022 7:16 PM  Workstation Name: [facility]  [facility]    MRI Foot Left Without Contrast    Result Date: 5/16/2022  Appearance concerning for osteomyelitis involving the 3rd metatarsal and likely the adjacent cuneiform.  This report was finalized on 5/16/2022 3:10 PM by Dr. Lobo Ring MD.      US Venous Doppler Lower Extremity Bilateral (duplex)    Result Date: 5/24/2022  No sonographic findings of DVT in the lower extremities.  This report was finalized on 5/24/2022 8:05 AM by Dr. Garry Lovelace II, MD.      CT  Lower Extremity Left With Contrast    Result Date: 5/10/2022  Marked soft tissue edema and fat stranding at the level of the foot, diffuse with scattered foci of air both within soft tissues and bony structures. Findings are concerning for gas gangrene and osteomyelitis. Both the midfoot and forefoot are involved. Signer Name: ANTELMO COFFMAN MD  Signed: 5/10/2022 6:14 PM  [facility]  [facility]    XR Foreign Body For MRI    Result Date: 5/16/2022  No radiopaque foreign body identified  This report was finalized on 5/16/2022 10:20 AM by Dr. Lobo Ring MD.      I have personally looked at the radiology images and read the final radiology report.    Assessment & Plan    Critical illness myopathy--patient requiring contact-guard for up with bed mobility; minimum assistance for transfers; not able to ambulate yet secondary to nonweightbearing status for affected foot.  Patient requiring maximum assistance for up with bathing; set up for upper body dressing; maximum assistance for lower body dressing; set up for grooming.    Status post left midfoot amputation secondary to osteomyelitis with staphylococcal aureus bacteremia patient is to continue vancomycin through June 23    Generalized edema with scrotal edema continue Lasix 40 mg twice daily.  Recheck BMP in the a.m.    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  Recommend outpatient GI evaluation to for likely screening EGD to evaluate for possible esophageal varices.  Cirrhosis is newly diagnosed since his recent acute hospitalization    Diabetes mellitus fair control continue Levemir and mealtime coverage    Continue bladder training    Anemia stable    Hypertension continue current treatment    Hyperlipidemia continue statin therapy    Obesity with a BMI of 37.39 kg per metered square.    Hypokalemia supplement per protocol    VTE Prophylaxis:   Mechanical Order History:      Ordered        06/02/22  1707  Maintain Sequential Compression Device  Continuous                    Pharmalogical Order History:      Ordered     Dose Route Frequency Stop    06/02/22 1707  Enoxaparin Sodium (LOVENOX) syringe 40 mg         40 mg SC Nightly --                    Joe Mike MD  [facility]  06/04/22  10:28 EDT

## 2022-06-04 NOTE — PROGRESS NOTES
Occupational Therapy:    Physical Therapy: Individual: 90 minutes.    Speech Language Pathology:    Signed by: Hanna Murphy PTA

## 2022-06-04 NOTE — PROGRESS NOTES
Vancomycin trough level obtained this AM was reported at 18 mg/L.  This is within the desired therapeutic trough range and correlates with an AUC of around 550-600.  Recommend continuing current dose of 1750mg iv q12hrs.  Pharmacy will continue to follow.  Thank you.

## 2022-06-04 NOTE — PROGRESS NOTES
Rehabilitation Nursing  Inpatient Rehabilitation Plan of Care Note    Plan of Care  Pain    Pain Management (Active)  Current Status (6/2/2022 4:40:00 PM): potential for increased pain  Weekly Goal: pain at a tolerable level  Discharge Goal: no pain    Body Function Structure    Skin Integrity (Active)  Current Status (6/2/2022 4:40:00 PM): impaired skin integrity  Weekly Goal: improved skin integrity  Discharge Goal: wound healing    Safety    Potential for Injury (Active)  Current Status (6/2/2022 4:40:00 PM): potential for falls  Weekly Goal: no falls  Discharge Goal: no falls    Signed by: Catracho Yoder RN

## 2022-06-04 NOTE — PLAN OF CARE
Goal Outcome Evaluation:  Plan of Care Reviewed With: patient        Progress: no change  Outcome Evaluation: Pt progressing with current therapies, cont POC.

## 2022-06-04 NOTE — THERAPY TREATMENT NOTE
Inpatient Rehabilitation - Occupational Therapy Treatment Note     [facility]     Patient Name: Melchor Hardwick  : 1965  MRN: [id number]    Today's Date: 2022                 Admit Date: 2022       No diagnosis found.    Patient Active Problem List   Diagnosis   • Hyperlipidemia   • Hypertensive disorder   • Kidney disease   • Obesity   • Type 2 diabetes mellitus (HCC)   • Gas gangrene of foot (HCC)   • Cellulitis in diabetic foot (HCC)   • Other acute osteomyelitis of left foot (HCC)   • Osteomyelitis (HCC)   • Critical illness myopathy       Past Medical History:   Diagnosis Date   • Diabetes mellitus (HCC)    • Elevated cholesterol    • Hyperlipidemia    • Hypertension        Past Surgical History:   Procedure Laterality Date   • ABSCESS DRAINAGE      PERINEUM   • AMPUTATION FOOT Left 2022    Procedure: AMPUTATION FOOT;  Surgeon: Addison Colby MD;  Location: [facility];  Service: Podiatry;  Laterality: Left;   • INCISION AND DRAINAGE LEG Left 05/10/2022    Procedure: INCISION AND DRAINAGE LOWER EXTREMITY;  Surgeon: Addison Colby MD;  Location: [facility];  Service: Podiatry;  Laterality: Left;   • WOUND DEBRIDEMENT Left 2022    Procedure: DEBRIDEMENT FOOT;  Surgeon: Addison Colby MD;  Location: [facility];  Service: Podiatry;  Laterality: Left;             IRF OT ASSESSMENT FLOWSHEET (last 12 hours)     IRF OT Evaluation and Treatment     Row Name 22 0951          OT Time and Intention    Document Type daily treatment  -LM     Mode of Treatment occupational therapy  -LM     Patient Effort good  -LM     Row Name 22 0951          General Information    Patient/Family/Caregiver Comments/Observations Patient seen this date for adl retraining/education, fxl mobility, fxl activity tolerance, bue therex.  Patient performed fxl transfer with min assist, self-feeding setup, grooming setup.  Patient performed pulleys, UBE x 16 mins, rickshaw with frequent rest breaks.  Demonstrated  good tolerance and participation.  -LM     Row Name 06/04/22 0951          Cognition/Psychosocial    Affect/Mental Status (Cognition) WFL  -LM     Orientation Status (Cognition) oriented x 3  -LM     Row Name 06/04/22 0951          Positioning and Restraints    Post Treatment Position wheelchair  -LM     In Wheelchair with PT  -LM           User Key  (r) = Recorded By, (t) = Taken By, (c) = Cosigned By    Initials Name Effective Dates    Kristine Tovar OT 06/16/21 -                  Occupational Therapy Education                 Title: PT OT SLP Therapies (Done)     Topic: Occupational Therapy (Done)     Point: ADL training (Done)     Description:   Instruct learner(s) on proper safety adaptation and remediation techniques during self care or transfers.   Instruct in proper use of assistive devices.              Learning Progress Summary           Patient Acceptance, E, VU,NR by  at 6/3/2022 1347                   Point: Precautions (Done)     Description:   Instruct learner(s) on prescribed precautions during self-care and functional transfers.              Learning Progress Summary           Patient Acceptance, E, VU,NR by  at 6/3/2022 1347                               User Key     Initials Effective Dates Name Provider Type [facility] 06/16/21 -  Mandi Smith OT Occupational Therapist OT                    OT Recommendation and Plan                         Time Calculation:      Time Calculation- OT     Row Name 06/04/22 0954             Time Calculation- OT    OT Start Time 0700  -LM      OT Stop Time 0830  -LM      OT Time Calculation (min) 90 min  -LM      Total Timed Code Minutes- OT 90 minute(s)  -LM            User Key  (r) = Recorded By, (t) = Taken By, (c) = Cosigned By    Initials Name Provider Type    Kristine Tovar OT Occupational Therapist              Therapy Charges for Today     Code Description Service Date Service Provider Modifiers Qty    67001784901  OT SELF  CARE/MGMT/TRAIN EA 15 MIN 6/4/2022 Kristine Morrell, OT GO 2    [id number]  OT THER PROC EA 15 MIN 6/4/2022 Kristine Morrell OT GO 4                   Kristine Morrell OT  6/4/2022

## 2022-06-04 NOTE — NURSING NOTE
Patient's novolog barcode was accidentally scanned twice making it appear as if the patient was given 2 units of novolog twice. However, the patient was only given the 2 units once. This was verified and confirmed by myself and Catracho Yoder RN.

## 2022-06-04 NOTE — PROGRESS NOTES
Physical Medicine and Rehabilitation  Inpatient Rehabilitation Interdisciplinary Plan of Care    Demographics            Age: 57Y            Gender: Male    Admission Date: 6/2/2022 4:40:00 PM  Rehabilitation Diagnosis:  Debility due to Left midfoot amputation secondary to  gas gangrene and osteomyelitis.    Plan of Care  Anticipated Discharge Date/Estimated Length of Stay: 14 days  Anticipated Discharge Destination: Community discharge with assistance  Discharge Plan : Pt plans to go home with sister Ros at discharge.  Medical Necessity Expected Level Rationale: Good  Intensity and Duration: an average of 3 hours/5 days per week  Medical Supervision and 24 Hour Rehab Nursing: x  Physical Therapy: x  PT Intensity/Duration: 1.5 hrs/day, 5-6 days/week  Occupational Therapy: x  OT Intensity/Duration: 1.5 hrs/day, 5-6 days/week  Social Work: x  Therapeutic Recreation: x  Updated (if changes indicated)  No changes to plan.    Based on the patient's medical and functional status, their prognosis and  expected level of functional improvement is: Good    Interdisciplinary Problem/Goals/Status  Copy from POCMobility    [PT] Bed/Chair/Wheelchair (Active)  Current Status (6/3/2022 12:00:00 AM): min A  Weekly Goal: Sup  Discharge Goal: Sup    [PT] Walk (Active)  Current Status (6/3/2022 12:00:00 AM): unable  Weekly Goal: amb 2' RW Sup  Discharge Goal: amb 2' RW Sup    Self Care    [OT] Dressing (Lower) (Active)  Current Status (6/3/2022 12:00:00 AM): Max A  Weekly Goal: Mod A  Discharge Goal: Min A    Pain    [RN] Pain Management (Active)  Current Status (6/2/2022 4:40:00 PM): potential for increased pain  Weekly Goal: pain at a tolerable level  Discharge Goal: no pain    Body Function Structure    [RN] Skin Integrity (Active)  Current Status (6/2/2022 4:40:00 PM): impaired skin integrity  Weekly Goal: improved skin integrity  Discharge Goal: wound healing    Safety    [RN] Potential for Injury (Active)  Current Status  (6/2/2022 4:40:00 PM): potential for falls  Weekly Goal: no falls  Discharge Goal: no falls    Medical Problems    Comments:    Signed by: Joe Mike, Physician

## 2022-06-04 NOTE — PROGRESS NOTES
Inpatient Rehabilitation Functional Measures Assessment    Functional Measures  SHANNON Eating:  SHANNON Grooming:  SHANNON Bathing:  SHANNON Upper Body Dressing:  SHANNON Lower Body Dressing:  SHANNON Toileting:    SHANNON Bladder Management  Level of Assistance:  Frequency/Number of Accidents this Shift:    SHANNON Bowel Management  Level of Assistance:  Frequency/Number of Accidents this Shift:    SHANNON Bed/Chair/Wheelchair Transfer:  SHANNON Toilet Transfer:  SHANNON Tub/Shower Transfer:    Previously Documented Mode of Locomotion at Discharge:  [facility] Expected Mode of Locomotion at Discharge:  [facility] Walk/Wheelchair:  [facility] Stairs:    [facility] Comprehension:  [facility] Expression:  [facility] Social Interaction:  [facility] Problem Solving:  SHANNON Memory:    Therapy Mode Minutes  Occupational Therapy: Individual: 90 minutes.  Physical Therapy:  Speech Language Pathology:    Discharge Functional Goals:    Signed by: Kristine Morrell, Occupational Therapist

## 2022-06-05 LAB
GLUCOSE BLDC GLUCOMTR-MCNC: 130 MG/DL (ref 70–130)
GLUCOSE BLDC GLUCOMTR-MCNC: 170 MG/DL (ref 70–130)
GLUCOSE BLDC GLUCOMTR-MCNC: 184 MG/DL (ref 70–130)
GLUCOSE BLDC GLUCOMTR-MCNC: 79 MG/DL (ref 70–130)

## 2022-06-05 PROCEDURE — 25010000002 VANCOMYCIN 5 G RECONSTITUTED SOLUTION: Performed by: FAMILY MEDICINE

## 2022-06-05 PROCEDURE — 82962 GLUCOSE BLOOD TEST: CPT

## 2022-06-05 PROCEDURE — 63710000001 INSULIN ASPART PER 5 UNITS: Performed by: FAMILY MEDICINE

## 2022-06-05 PROCEDURE — 25010000002 ENOXAPARIN PER 10 MG: Performed by: FAMILY MEDICINE

## 2022-06-05 PROCEDURE — 99231 SBSQ HOSP IP/OBS SF/LOW 25: CPT | Performed by: FAMILY MEDICINE

## 2022-06-05 PROCEDURE — 63710000001 INSULIN DETEMIR PER 5 UNITS: Performed by: FAMILY MEDICINE

## 2022-06-05 RX ADMIN — METFORMIN HYDROCHLORIDE 500 MG: 500 TABLET ORAL at 09:02

## 2022-06-05 RX ADMIN — PANTOPRAZOLE SODIUM 40 MG: 40 TABLET, DELAYED RELEASE ORAL at 05:27

## 2022-06-05 RX ADMIN — ENOXAPARIN SODIUM 40 MG: 40 INJECTION SUBCUTANEOUS at 20:21

## 2022-06-05 RX ADMIN — ATORVASTATIN CALCIUM 40 MG: 40 TABLET, FILM COATED ORAL at 20:21

## 2022-06-05 RX ADMIN — Medication 1 TABLET: at 09:02

## 2022-06-05 RX ADMIN — VANCOMYCIN HYDROCHLORIDE 1750 MG: 5 INJECTION, POWDER, LYOPHILIZED, FOR SOLUTION INTRAVENOUS at 09:05

## 2022-06-05 RX ADMIN — ASPIRIN 81 MG: 81 TABLET, COATED ORAL at 09:02

## 2022-06-05 RX ADMIN — NYSTATIN 1 APPLICATION: 100000 POWDER TOPICAL at 09:05

## 2022-06-05 RX ADMIN — VANCOMYCIN HYDROCHLORIDE 1750 MG: 5 INJECTION, POWDER, LYOPHILIZED, FOR SOLUTION INTRAVENOUS at 20:21

## 2022-06-05 RX ADMIN — NYSTATIN: 100000 POWDER TOPICAL at 20:21

## 2022-06-05 RX ADMIN — FUROSEMIDE 40 MG: 40 TABLET ORAL at 09:02

## 2022-06-05 RX ADMIN — FUROSEMIDE 40 MG: 40 TABLET ORAL at 17:34

## 2022-06-05 RX ADMIN — DOCUSATE SODIUM 50 MG AND SENNOSIDES 8.6 MG 2 TABLET: 8.6; 5 TABLET, FILM COATED ORAL at 20:20

## 2022-06-05 RX ADMIN — METFORMIN HYDROCHLORIDE 500 MG: 500 TABLET ORAL at 17:35

## 2022-06-05 RX ADMIN — NADOLOL 20 MG: 40 TABLET ORAL at 09:02

## 2022-06-05 RX ADMIN — INSULIN ASPART 15 UNITS: 100 INJECTION, SOLUTION INTRAVENOUS; SUBCUTANEOUS at 17:35

## 2022-06-05 RX ADMIN — INSULIN DETEMIR 45 UNITS: 100 INJECTION, SOLUTION SUBCUTANEOUS at 09:04

## 2022-06-05 RX ADMIN — INSULIN ASPART 2 UNITS: 100 INJECTION, SOLUTION INTRAVENOUS; SUBCUTANEOUS at 17:35

## 2022-06-05 RX ADMIN — SPIRONOLACTONE 50 MG: 25 TABLET ORAL at 09:03

## 2022-06-05 RX ADMIN — Medication 10 ML: at 20:21

## 2022-06-05 RX ADMIN — Medication 10 ML: at 09:05

## 2022-06-05 RX ADMIN — DOCUSATE SODIUM 50 MG AND SENNOSIDES 8.6 MG 2 TABLET: 8.6; 5 TABLET, FILM COATED ORAL at 09:03

## 2022-06-05 RX ADMIN — INSULIN ASPART 15 UNITS: 100 INJECTION, SOLUTION INTRAVENOUS; SUBCUTANEOUS at 12:50

## 2022-06-05 RX ADMIN — HYDROCODONE BITARTRATE AND ACETAMINOPHEN 1 TABLET: 7.5; 325 TABLET ORAL at 20:21

## 2022-06-05 RX ADMIN — Medication 10 ML: at 09:03

## 2022-06-05 NOTE — PLAN OF CARE
Goal Outcome Evaluation:  Plan of Care Reviewed With: patient        Progress: improving  Outcome Evaluation: Pt progressing with current therapies, cont POC.

## 2022-06-05 NOTE — PROGRESS NOTES
[facility]  PROGRESS NOTE     Patient Identification:  Name:  Melchor Hardwick  Age:  57 y.o.  Sex:  male  :  1965  MRN:  [id number]  Visit Number:  [id number]  ROOM: 109/     Primary Care Provider:  Missy Up APRN    Length of stay in inpatient status:  3    Subjective     Chief Compliant:  No chief complaint on file.      History of Presenting Illness: 57-year-old gentleman being treated for critical illness myopathy.  Patient is status post left midfoot amputation secondary to osteomyelitis with staphylococcal aureus bacteremia.  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  Edema appears to be slightly improved.  Has no new complaints otherwise    Objective     Current Hospital Meds:aspirin, 81 mg, Oral, Daily  atorvastatin, 40 mg, Oral, Nightly  enoxaparin, 40 mg, Subcutaneous, Nightly  furosemide, 40 mg, Oral, BID  Insulin Aspart, 0-7 Units, Subcutaneous, TID AC  Insulin Aspart, 15 Units, Subcutaneous, TID AC  insulin detemir, 45 Units, Subcutaneous, Daily  metFORMIN, 500 mg, Oral, BID With Meals  multivitamin, 1 tablet, Oral, Daily  nadolol, 20 mg, Oral, Q24H  nystatin, , Topical, Q12H  pantoprazole, 40 mg, Oral, Q AM  senna-docusate sodium, 2 tablet, Oral, BID  sodium chloride, 10 mL, Intravenous, Q12H  sodium chloride, 10 mL, Intravenous, Q12H  spironolactone, 50 mg, Oral, Daily  vancomycin, 1,750 mg, Intravenous, Q12H       ----------------------------------------------------------------------------------------------------------------------  Vital Signs:  Temp:  [97.8 °F (36.6 °C)] 97.8 °F (36.6 °C)  Heart Rate:  [74-75] 75  Resp:  [18] 18  BP: (107-124)/(66-72) 124/72  SpO2:  [98 %] 98 %  on   ;   Device (Oxygen Therapy): room air  Body mass index is 37.39 kg/m².    Wt Readings from Last 3 Encounters:   22 118 kg (260 lb 9.6 oz)   22 117 kg (258 lb)      Intake & Output (last 3 days)       06/02 0701  06/03 0700 06/03 0701  06/04 0700 06/04 0701 06/05 0700 06/05 0701 06/06 0700    P.O. 480 1800 1800 240    I.V. (mL/kg) 500 (4.3) 500 (4.2)      IV Piggyback  1000 1000 500    Total Intake(mL/kg) 980 (8.4) 3300 (28) 2800 (23.7) 740 (6.3)    Urine (mL/kg/hr) 1300 4450 (1.6) 3950 (1.4)     Stool  0 2     Total Output 1300 4450 3952     Net -320 -1150 -1152 +740            Stool Unmeasured Occurrence  1 x          Diet Regular; Cardiac, Consistent Carbohydrate, Daily Fluid Restriction; 1500 mL Fluid Per Day; High Protein  ----------------------------------------------------------------------------------------------------------------------  Physical exam:  Constitutional:   No acute distress  HEENT: Normocephalic atraumatic  Neck:    Supple  Cardiovascular: Regular rate and rhythm  Pulmonary/Chest: Clear to auscultation  Abdominal: Positive bowel sounds soft.   Musculoskeletal: Status post left forefoot amputation--wound appears to be healing well at.  No erythema no drainage from the site  Neurological: Sensory change stocking glove distribution  Skin: No rash  Peripheral vascular: 1-2+ edema in lower extremities but improved.  Genitourinary: Scrotal edema improved  ----------------------------------------------------------------------------------------------------------------------    Last echocardiogram:  Results for orders placed during the hospital encounter of 05/10/22    Adult Transthoracic Echo Complete W/ Cont if Necessary Per Protocol    Interpretation Summary  · Normal left ventricular cavity size and wall thickness noted. All left ventricular wall segments contract normally.  · Left ventricular ejection fraction appears to be 61 - 65%.  · The mitral valve is structurally normal with no significant stenosis present. Trace mitral valve regurgitation is present.  · The aortic valve is structurally normal with no regurgitation or stenosis present.  · There is  no evidence of pericardial effusion.    ----------------------------------------------------------------------------------------------------------------------  Results from last 7 days   Lab Units 06/04/22  0030 05/30/22  0628   WBC 10*3/mm3 5.58 3.63   HEMOGLOBIN g/dL 10.1* 9.1*   HEMATOCRIT % 31.8* 28.1*   MCV fL 79.1 78.5*   MCHC g/dL 31.8 32.4   PLATELETS 10*3/mm3 140 120*   INR   --  1.04         Results from last 7 days   Lab Units 06/04/22  1829 06/04/22  0030 06/01/22  0452 05/31/22  1140 05/30/22  0628   SODIUM mmol/L  --  134* 134* 133* 136   POTASSIUM mmol/L 3.8 3.3* 3.7 3.9 4.1   MAGNESIUM mg/dL  --   --   --  1.9  --    CHLORIDE mmol/L  --  104 103 103 103   CO2 mmol/L  --  23.1 24.3 23.7 24.7   BUN mg/dL  --  21* 19 20 15   CREATININE mg/dL  --  0.73* 0.70* 0.78 0.65*   CALCIUM mg/dL  --  8.1* 7.8* 7.7* 7.8*   GLUCOSE mg/dL  --  86 174* 232* 197*   ALBUMIN g/dL  --  1.89*  --   --  1.92*   BILIRUBIN mg/dL  --  0.4  --   --  0.3   ALK PHOS U/L  --  293*  --   --  337*   AST (SGOT) U/L  --  47*  --   --  67*   ALT (SGPT) U/L  --  37  --   --  43*   Estimated Creatinine Clearance: 143.7 mL/min (A) (by C-G formula based on SCr of 0.73 mg/dL (L)).  No results found for: AMMONIA  Results from last 7 days   Lab Units 05/30/22  0628   CK TOTAL U/L 36             Glucose   Date/Time Value Ref Range Status   06/05/2022 1109 130 70 - 130 mg/dL Final     Comment:     Meter: [id number] : [id number] Christy Berriosyla   06/05/2022 0858 170 (H) 70 - 130 mg/dL Final     Comment:     Meter: [id number] : [id number] Steve Yancey   06/05/2022 0603 79 70 - 130 mg/dL Final     Comment:     Meter: [id number] : [id number] [place]   06/04/2022 1638 138 (H) 70 - 130 mg/dL Final     Comment:     Meter: [id number] : [id number] Penelope Dawson   06/04/2022 1121 236 (H) 70 - 130 mg/dL Final     Comment:     Meter: [id number] : [id number] Penelope Dawson   06/04/2022 0643 90 70 - 130 mg/dL Final     Comment:      Meter: [id number] : [id number] Raul Crooks Jazmin   06/03/2022 1940 181 (H) 70 - 130 mg/dL Final     Comment:     Meter: [id number] : [id number] Raul Crooks Jazmin   06/03/2022 1642 182 (H) 70 - 130 mg/dL Final     Comment:     Meter: [id number] : [id number] leonie lino     Lab Results   Component Value Date    TSH 4.700 (H) 05/30/2022    FREET4 0.87 (L) 05/30/2022     No results found for: PREGTESTUR, PREGSERUM, HCG, HCGQUANT  Pain Management Panel     Pain Management Panel Latest Ref Rng & Units 5/24/2022 5/11/2022    CREATININE UR mg/dL 91.0 -    AMPHETAMINES SCREEN, URINE Negative - Negative    BARBITURATES SCREEN Negative - Negative    BENZODIAZEPINE SCREEN, URINE Negative - Negative    BUPRENORPHINEUR Negative - Negative    COCAINE SCREEN, URINE Negative - Negative    METHADONE SCREEN, URINE Negative - Negative    METHAMPHETAMINEUR Negative - Negative        Brief Urine Lab Results  (Last result in the past 365 days)      Color   Clarity   Blood   Leuk Est   Nitrite   Protein   CREAT   Urine HCG        05/24/22 1839             91.0             No results found for: BLOODCX      No results found for: URINECX  No results found for: WOUNDCX  No results found for: STOOLCX        I have personally looked at the labs and they are summarized above.  ----------------------------------------------------------------------------------------------------------------------  Detailed radiology reports for the last 24 hours:    Imaging Results (Last 24 Hours)     ** No results found for the last 24 hours. **        Final impressions for the last 30 days of radiology reports:    CT Abdomen Pelvis Without Contrast    Result Date: 5/25/2022  1.  Advanced liver cirrhosis with changes of portal hypertension which include spinal megaly, prominent portosystemic collateral vessels, small-moderate volume ascites, and marked anasarca. 2.  Moderate constipation and mild generalized ileus. No bowel obstruction. 3.   Miniscule pleural effusions. 4.  Other incidental and nonacute findings detailed above.  This report was finalized on 5/25/2022 8:48 AM by Dr. Brain Rodriguez MD.      XR Foot 3+ View Left    Result Date: 5/10/2022    Degenerative change of the midfoot.  This report was finalized on 5/10/2022 4:17 PM by Dr. Oswaldo Brown MD.      US Abdomen Complete    Result Date: 5/20/2022  1. Splenomegaly. 2. Small volume ascites.  This report was finalized on 5/20/2022 12:59 PM by Dr. Lobo Ring MD.      US Scrotum & Testicles    Result Date: 5/20/2022  Extensive scrotal wall swelling.   This report was finalized on 5/20/2022 12:59 PM by Dr. Lobo Ring MD.      US Scrotum & Testicles    Result Date: 5/16/2022  Scrotal wall thickening  This report was finalized on 5/16/2022 4:57 PM by Dr. Lobo Ring MD.      US Arterial Doppler Lower Extremity Left    Result Date: 5/10/2022  Abnormal examination demonstrating elevated flow velocities throughout the left lower extremity arterial system and biphasic flow within the left posterior tibial artery. Contemporaneously performed CT demonstrates imaging findings highly suspicious for gas gangrene and infection/osteomyelitis. CT dictated separately. Signer Name: Oscar Dixon MD  Signed: 5/10/2022 7:16 PM  Workstation Name: [facility]  [facility]    MRI Foot Left Without Contrast    Result Date: 5/16/2022  Appearance concerning for osteomyelitis involving the 3rd metatarsal and likely the adjacent cuneiform.  This report was finalized on 5/16/2022 3:10 PM by Dr. Lobo Ring MD.      US Venous Doppler Lower Extremity Bilateral (duplex)    Result Date: 5/24/2022  No sonographic findings of DVT in the lower extremities.  This report was finalized on 5/24/2022 8:05 AM by Dr. Garry Lovelace II, MD.      CT Lower Extremity Left With Contrast    Result Date: 5/10/2022  Marked soft tissue edema and fat stranding at the level of the foot, diffuse with scattered  foci of air both within soft tissues and bony structures. Findings are concerning for gas gangrene and osteomyelitis. Both the midfoot and forefoot are involved. Signer Name: ANTELMO COFFMAN MD  Signed: 5/10/2022 6:14 PM  Workstation Name: [email or web address]  [facility]    XR Foreign Body For MRI    Result Date: 5/16/2022  No radiopaque foreign body identified  This report was finalized on 5/16/2022 10:20 AM by Dr. Lobo Ring MD.      I have personally looked at the radiology images and read the final radiology report.    Assessment & Plan    Critical illness myopathy--patient requiring minimum assistance for up with transfers; contact-guard for bed mobility; required maximum assistance for bathing; set up for upper body dressing; maximum assistance for lower body dressing; set up for grooming.    Status post left foot forefoot amputation--will consult Dr. Colby for follow-up of wound.  Continue IV antibiotic therapy    Staphylococcal aureus bacteremia--continue vancomycin through June 23    Generalized edema with scrotal edema continue Lasix 40 mg twice daily.      Cirrhosis secondary to [place] nadolol.  Likely benefit from outpatient GI evaluation May need screening for esophageal varices    Diabetes mellitus controlled continue Levemir and mealtime coverage    Anemia stable    Hypertension continue current treatment    Obesity with BMI of 37.39 kg per metered square  Hyperlipidemia continue statin therapy      VTE Prophylaxis:   Mechanical Order History:      Ordered        06/02/22 1707  Maintain Sequential Compression Device  Continuous                    Pharmalogical Order History:      Ordered     Dose Route Frequency Stop    06/02/22 1707  Enoxaparin Sodium (LOVENOX) syringe 40 mg         40 mg SC Nightly --                    Joe Mike MD  [facility] Hospitalist  06/05/22  11:29 EDT

## 2022-06-05 NOTE — PROGRESS NOTES
Rehabilitation Nursing  Inpatient Rehabilitation Plan of Care Note    Plan of Care  Copy from Junie    Pain Management (Active)  Current Status (6/2/2022 4:40:00 PM): potential for increased pain  Weekly Goal: pain at a tolerable level  Discharge Goal: no pain    Body Function Structure    Skin Integrity (Active)  Current Status (6/2/2022 4:40:00 PM): impaired skin integrity  Weekly Goal: improved skin integrity  Discharge Goal: wound healing    Safety    Potential for Injury (Active)  Current Status (6/2/2022 4:40:00 PM): potential for falls  Weekly Goal: no falls  Discharge Goal: no falls    Signed by: Nir Maxwell RN

## 2022-06-06 LAB
ANION GAP SERPL CALCULATED.3IONS-SCNC: 9.4 MMOL/L (ref 5–15)
BASOPHILS # BLD AUTO: 0.02 10*3/MM3 (ref 0–0.2)
BASOPHILS NFR BLD AUTO: 0.4 % (ref 0–1.5)
BUN SERPL-MCNC: 18 MG/DL (ref 6–20)
BUN/CREAT SERPL: 24.7 (ref 7–25)
CALCIUM SPEC-SCNC: 7.9 MG/DL (ref 8.6–10.5)
CHLORIDE SERPL-SCNC: 105 MMOL/L (ref 98–107)
CO2 SERPL-SCNC: 22.6 MMOL/L (ref 22–29)
CREAT SERPL-MCNC: 0.73 MG/DL (ref 0.76–1.27)
DEPRECATED RDW RBC AUTO: 49.5 FL (ref 37–54)
EGFRCR SERPLBLD CKD-EPI 2021: 106.1 ML/MIN/1.73
EOSINOPHIL # BLD AUTO: 0.16 10*3/MM3 (ref 0–0.4)
EOSINOPHIL NFR BLD AUTO: 3.3 % (ref 0.3–6.2)
ERYTHROCYTE [DISTWIDTH] IN BLOOD BY AUTOMATED COUNT: 17.3 % (ref 12.3–15.4)
GLUCOSE BLDC GLUCOMTR-MCNC: 127 MG/DL (ref 70–130)
GLUCOSE BLDC GLUCOMTR-MCNC: 139 MG/DL (ref 70–130)
GLUCOSE BLDC GLUCOMTR-MCNC: 91 MG/DL (ref 70–130)
GLUCOSE SERPL-MCNC: 84 MG/DL (ref 65–99)
HCT VFR BLD AUTO: 29.7 % (ref 37.5–51)
HGB BLD-MCNC: 9.4 G/DL (ref 13–17.7)
IMM GRANULOCYTES # BLD AUTO: 0.02 10*3/MM3 (ref 0–0.05)
IMM GRANULOCYTES NFR BLD AUTO: 0.4 % (ref 0–0.5)
LYMPHOCYTES # BLD AUTO: 1.99 10*3/MM3 (ref 0.7–3.1)
LYMPHOCYTES NFR BLD AUTO: 41.1 % (ref 19.6–45.3)
MCH RBC QN AUTO: 25.1 PG (ref 26.6–33)
MCHC RBC AUTO-ENTMCNC: 31.6 G/DL (ref 31.5–35.7)
MCV RBC AUTO: 79.4 FL (ref 79–97)
MONOCYTES # BLD AUTO: 0.53 10*3/MM3 (ref 0.1–0.9)
MONOCYTES NFR BLD AUTO: 11 % (ref 5–12)
NEUTROPHILS NFR BLD AUTO: 2.12 10*3/MM3 (ref 1.7–7)
NEUTROPHILS NFR BLD AUTO: 43.8 % (ref 42.7–76)
NRBC BLD AUTO-RTO: 0 /100 WBC (ref 0–0.2)
PLATELET # BLD AUTO: 117 10*3/MM3 (ref 140–450)
PMV BLD AUTO: 10.4 FL (ref 6–12)
POTASSIUM SERPL-SCNC: 3.6 MMOL/L (ref 3.5–5.2)
POTASSIUM SERPL-SCNC: 4 MMOL/L (ref 3.5–5.2)
RBC # BLD AUTO: 3.74 10*6/MM3 (ref 4.14–5.8)
SODIUM SERPL-SCNC: 137 MMOL/L (ref 136–145)
WBC NRBC COR # BLD: 4.84 10*3/MM3 (ref 3.4–10.8)

## 2022-06-06 PROCEDURE — 63710000001 INSULIN ASPART PER 5 UNITS: Performed by: FAMILY MEDICINE

## 2022-06-06 PROCEDURE — 97530 THERAPEUTIC ACTIVITIES: CPT

## 2022-06-06 PROCEDURE — 25010000002 VANCOMYCIN 5 G RECONSTITUTED SOLUTION: Performed by: FAMILY MEDICINE

## 2022-06-06 PROCEDURE — 82962 GLUCOSE BLOOD TEST: CPT

## 2022-06-06 PROCEDURE — 97535 SELF CARE MNGMENT TRAINING: CPT | Performed by: OCCUPATIONAL THERAPIST

## 2022-06-06 PROCEDURE — 97110 THERAPEUTIC EXERCISES: CPT

## 2022-06-06 PROCEDURE — 97110 THERAPEUTIC EXERCISES: CPT | Performed by: OCCUPATIONAL THERAPIST

## 2022-06-06 PROCEDURE — 80048 BASIC METABOLIC PNL TOTAL CA: CPT | Performed by: FAMILY MEDICINE

## 2022-06-06 PROCEDURE — 85025 COMPLETE CBC W/AUTO DIFF WBC: CPT | Performed by: FAMILY MEDICINE

## 2022-06-06 PROCEDURE — 97530 THERAPEUTIC ACTIVITIES: CPT | Performed by: OCCUPATIONAL THERAPIST

## 2022-06-06 PROCEDURE — 63710000001 INSULIN DETEMIR PER 5 UNITS: Performed by: FAMILY MEDICINE

## 2022-06-06 PROCEDURE — 25010000002 ENOXAPARIN PER 10 MG: Performed by: FAMILY MEDICINE

## 2022-06-06 PROCEDURE — 84132 ASSAY OF SERUM POTASSIUM: CPT | Performed by: INTERNAL MEDICINE

## 2022-06-06 RX ORDER — POTASSIUM CHLORIDE 20 MEQ/1
40 TABLET, EXTENDED RELEASE ORAL EVERY 4 HOURS
Status: COMPLETED | OUTPATIENT
Start: 2022-06-06 | End: 2022-06-06

## 2022-06-06 RX ADMIN — POTASSIUM CHLORIDE 40 MEQ: 20 TABLET, EXTENDED RELEASE ORAL at 05:09

## 2022-06-06 RX ADMIN — METFORMIN HYDROCHLORIDE 500 MG: 500 TABLET ORAL at 17:38

## 2022-06-06 RX ADMIN — Medication 10 ML: at 08:58

## 2022-06-06 RX ADMIN — NYSTATIN 1 APPLICATION: 100000 POWDER TOPICAL at 09:06

## 2022-06-06 RX ADMIN — ASPIRIN 81 MG: 81 TABLET, COATED ORAL at 09:00

## 2022-06-06 RX ADMIN — NADOLOL 20 MG: 40 TABLET ORAL at 08:59

## 2022-06-06 RX ADMIN — Medication 10 ML: at 20:17

## 2022-06-06 RX ADMIN — INSULIN ASPART 15 UNITS: 100 INJECTION, SOLUTION INTRAVENOUS; SUBCUTANEOUS at 09:00

## 2022-06-06 RX ADMIN — ATORVASTATIN CALCIUM 40 MG: 40 TABLET, FILM COATED ORAL at 20:17

## 2022-06-06 RX ADMIN — INSULIN ASPART 15 UNITS: 100 INJECTION, SOLUTION INTRAVENOUS; SUBCUTANEOUS at 17:39

## 2022-06-06 RX ADMIN — VANCOMYCIN HYDROCHLORIDE 1750 MG: 5 INJECTION, POWDER, LYOPHILIZED, FOR SOLUTION INTRAVENOUS at 09:00

## 2022-06-06 RX ADMIN — FUROSEMIDE 40 MG: 40 TABLET ORAL at 17:38

## 2022-06-06 RX ADMIN — METFORMIN HYDROCHLORIDE 500 MG: 500 TABLET ORAL at 08:58

## 2022-06-06 RX ADMIN — VANCOMYCIN HYDROCHLORIDE 1750 MG: 5 INJECTION, POWDER, LYOPHILIZED, FOR SOLUTION INTRAVENOUS at 20:18

## 2022-06-06 RX ADMIN — POTASSIUM CHLORIDE 40 MEQ: 20 TABLET, EXTENDED RELEASE ORAL at 11:59

## 2022-06-06 RX ADMIN — Medication 1 TABLET: at 08:58

## 2022-06-06 RX ADMIN — ENOXAPARIN SODIUM 40 MG: 40 INJECTION SUBCUTANEOUS at 20:17

## 2022-06-06 RX ADMIN — INSULIN DETEMIR 45 UNITS: 100 INJECTION, SOLUTION SUBCUTANEOUS at 09:00

## 2022-06-06 RX ADMIN — NYSTATIN: 100000 POWDER TOPICAL at 20:18

## 2022-06-06 RX ADMIN — FUROSEMIDE 40 MG: 40 TABLET ORAL at 08:59

## 2022-06-06 RX ADMIN — PANTOPRAZOLE SODIUM 40 MG: 40 TABLET, DELAYED RELEASE ORAL at 05:09

## 2022-06-06 RX ADMIN — SPIRONOLACTONE 50 MG: 25 TABLET ORAL at 08:59

## 2022-06-06 NOTE — SIGNIFICANT NOTE
06/06/22 1430   Plan   Plan Spoke to pt about contacting Social Security office to set-up an appointment for telephone disability application and he is agreeable.  Contacted [employer] [facility] Security office [phone] per Abner and gave phone to pt to set-up an appointment.  Pt says they will call him today for telephone application.  Pt plans to go home with sister Ros White who lives at [address] [place] [address] in [facility] at discharge.  Informed pt about team conference meeting on 6-7-22.  Will follow.   Patient/Family in Agreement with Plan yes

## 2022-06-06 NOTE — PROGRESS NOTES
Rehabilitation Nursing  Inpatient Rehabilitation Plan of Care Note    Plan of Care  Copy from Junie    Pain Management (Active)  Current Status (6/2/2022 4:40:00 PM): potential for increased pain  Weekly Goal: pain at a tolerable level  Discharge Goal: no pain    Body Function Structure    Skin Integrity (Active)  Current Status (6/2/2022 4:40:00 PM): impaired skin integrity  Weekly Goal: improved skin integrity  Discharge Goal: wound healing    Safety    Potential for Injury (Active)  Current Status (6/2/2022 4:40:00 PM): potential for falls  Weekly Goal: no falls  Discharge Goal: no falls    Signed by: Naye Wilson, Nurse

## 2022-06-06 NOTE — PROGRESS NOTES
Occupational Therapy:    Physical Therapy: Individual: 90 minutes.    Speech Language Pathology:    Signed by: Michi Mckeon PTA

## 2022-06-06 NOTE — NURSING NOTE
Wound consult for denuded area to bilateral buttock and coccyx. Area presents with non blanchable erythema with small breaks to the epidermis. This is a result of intertriginous dermatitis and moisture associated skin damage not pressure. PT reports this has been present all his life. Continue with magic barrier cream Q shift and PRN

## 2022-06-06 NOTE — NURSING NOTE
Spoke with judson at dr gibson office.she states dr bettencourt is out of town this week no other coverage. They will have him follow up on Monday when he returns. Notified Natalie henson of dr gibson absence at this time.

## 2022-06-06 NOTE — PROGRESS NOTES
[facility] Coordinator  Inpatient Rehabilitation Admission    Ethnic Group: White.  Marital Status:  Marital Status: Never .    IRF Admission Date:  06/02/2022  Admission Class: Initial Rehab.  Admit From:  [facility]    Pre-Hospital Living: Home. Pre-Hospital Living  With: (1) Alone.    Payment Sources: Primary: Not Listed.  Secondary: Not Listed.  Impairment Group: 16 Debility (non-cardiac, non-pulmonary)  Date of Onset of Impairment: 05/10/2022    Etiologic Diagnosis Code(s):  Rank Code      Description  1    A41.02    Sepsis due to Methicillin resistant                 Staphylococcus aureus    Comorbidities:      Height on Admission: 70 inches.  Weight on Admission: 258 pounds.    Are there any arthritis conditions recorded for Impairment Group, Etiologic  Diagnosis, or Comorbid Conditions that meet all of the regulatory requirements  for IRF classification (in 42 .29(b)(2)(x), (xi), and xii))?    SHANNON Bladder Accidents:  0 - Accidents.  Bladder Score = 6. Patient has not had an accident, but uses a  device/medication. hernandez catheter  SHANNON Bowel Accident: 0 -Accidents.  Bowel Score = 6. Patient has no accidents, but uses a device/medications.  medication    Signed by: Jaja Ramirez, Supervisor

## 2022-06-06 NOTE — THERAPY TREATMENT NOTE
Inpatient Rehabilitation - Physical Therapy Treatment Note        [place]     Patient Name: Melchor Hardwick  : 1965  MRN: [id number]    Today's Date: 2022                    Admit Date: 2022      Visit Dx:   No diagnosis found.    Patient Active Problem List   Diagnosis   • Hyperlipidemia   • Hypertensive disorder   • Kidney disease   • Obesity   • Type 2 diabetes mellitus (HCC)   • Gas gangrene of foot (HCC)   • Cellulitis in diabetic foot (HCC)   • Other acute osteomyelitis of left foot (HCC)   • Osteomyelitis (HCC)   • Critical illness myopathy       Past Medical History:   Diagnosis Date   • Diabetes mellitus (HCC)    • Elevated cholesterol    • Hyperlipidemia    • Hypertension        Past Surgical History:   Procedure Laterality Date   • ABSCESS DRAINAGE      PERINEUM   • AMPUTATION FOOT Left 2022    Procedure: AMPUTATION FOOT;  Surgeon: Addison Colby MD;  Location: [facility];  Service: Podiatry;  Laterality: Left;   • INCISION AND DRAINAGE LEG Left 05/10/2022    Procedure: INCISION AND DRAINAGE LOWER EXTREMITY;  Surgeon: Addison Colby MD;  Location: [facility];  Service: Podiatry;  Laterality: Left;   • WOUND DEBRIDEMENT Left 2022    Procedure: DEBRIDEMENT FOOT;  Surgeon: Addison Colby MD;  Location: [facility];  Service: Podiatry;  Laterality: Left;       PT ASSESSMENT (last 12 hours)     IRF PT Evaluation and Treatment     Row Name 22 1456 22 1428       PT Time and Intention    Document Type daily treatment  am session  -LB daily treatment  -RG    Mode of Treatment individual therapy;physical therapy  -LB individual therapy;physical therapy  -RG    Patient/Family/Caregiver Comments/Observations -- Pt and nursing in agreement for skilled PT on this date.  -RG    Row Name 22 1456 22 1428       General Information    Patient Profile Reviewed yes  -LB yes  -RG    Existing Precautions/Restrictions fall;non-weight bearing  hernandez, <skin integrity L foot  -LB  fall;non-weight bearing  hernandez, <skin integrity L foot  -RG    Row Name 06/06/22 1456 06/06/22 1428       Pain Scale: FACES Pre/Post-Treatment    Pain: FACES Scale, Pretreatment 6-->hurts even more  -LB 6-->hurts even more  -RG    Posttreatment Pain Rating 6-->hurts even more  -LB 6-->hurts even more  -RG    Pain Location - --  left foot  -LB --    Pre/Posttreatment Pain Comment rest, repositioned  -LB --    Row Name 06/06/22 1456 06/06/22 1428       Cognition/Psychosocial    Affect/Mental Status (Cognition) WFL  -LB WFL  -RG    Follows Commands (Cognition) WFL  -LB WFL  -RG    Personal Safety Interventions gait belt;nonskid shoes/slippers when out of bed;supervised activity  -LB gait belt;nonskid shoes/slippers when out of bed;fall prevention program maintained  -RG    Row Name 06/06/22 1456 06/06/22 1428       Mobility    Left Lower Extremity (Weight-bearing Status) non weight-bearing (NWB)  -LB non weight-bearing (NWB)  -RG    Right Lower Extremity (Weight-bearing Status) weight-bearing as tolerated (WBAT)  -LB weight-bearing as tolerated (WBAT)  -RG    Row Name 06/06/22 1456 06/06/22 1428       Bed Mobility    Supine-Sit-Supine Pine (Bed Mobility) -- contact guard;verbal cues;nonverbal cues (demo/gesture)  -RG    Comment, (Bed Mobility) not observed  -LB --    Row Name 06/06/22 1456 06/06/22 1428       Transfers    Chair-Bed Pine (Transfers) -- minimum assist (75% patient effort);verbal cues;nonverbal cues (demo/gesture);contact guard  -RG    Sit-Stand [place] (Transfers) verbal cues;nonverbal cues (demo/gesture);standby assist  -LB verbal cues;nonverbal cues (demo/gesture);contact guard  -RG    Stand-Sit [place] (Transfers) verbal cues;nonverbal cues (demo/gesture);standby assist  -LB verbal cues;nonverbal cues (demo/gesture);contact guard  -RG    Row Name 06/06/22 1428          Chair-Bed Transfer    Assistive Device (Chair-Bed Transfers) wheelchair;walker, front-wheeled  -RG     Row  Name 06/06/22 1456 06/06/22 1428       Sit-Stand Transfer    Assistive Device (Sit-Stand Transfers) parallel bars  -LB parallel bars  -RG    Row Name 06/06/22 1456 06/06/22 1428       Stand-Sit Transfer    Assistive Device (Stand-Sit Transfers) parallel bars  -LB parallel bars  -RG    Row Name 06/06/22 1428          Safety Issues, Functional Mobility    Impairments Affecting Function (Mobility) balance;endurance/activity tolerance;pain;strength  -RG     Row Name 06/06/22 1456          Balance    Comment, Balance sitting bean bag toss and  with reacher 2x  -LB     Row Name 06/06/22 1456          Motor Skills    Therapeutic Exercise --  sitting ex, standing ex  -LB     Row Name 06/06/22 1428          Hip (Therapeutic Exercise)    Hip Strengthening (Therapeutic Exercise) bilateral;flexion;aBduction;aDduction;marching while seated;sitting;2 lb free weight;resistance band;green;10 repetitions;2 sets  -RG     Row Name 06/06/22 1428          Knee (Therapeutic Exercise)    Knee Strengthening (Therapeutic Exercise) bilateral;flexion;extension;marching while seated;LAQ (long arc quad);sitting;2 lb free weight;resistance band;green;10 repetitions;2 sets  -RG     Row Name 06/06/22 1428          Ankle (Therapeutic Exercise)    Ankle Strengthening (Therapeutic Exercise) dorsiflexion;plantarflexion;sitting;right;20 repititions  -RG     Row Name 06/06/22 1456 06/06/22 1428       Positioning and Restraints    Pre-Treatment Position sitting in chair/recliner  -LB sitting in chair/recliner  -RG    Post Treatment Position -- bed  -RG    In Bed -- notified nsg;supine;call light within reach;encouraged to call for assist;legs elevated  -RG    In Wheelchair call light within reach;encouraged to call for assist;legs elevated  -LB --    Row Name 06/06/22 1456 06/06/22 1428       Therapy Assessment/Plan (PT)    Patient's Goals For Discharge return home  -LB return home  -RG    Row Name 06/06/22 1456 06/06/22 1428       Therapy  Assessment/Plan (PT)    Rehab Potential/Prognosis (PT) adequate, monitor progress closely  -LB adequate, monitor progress closely  -RG    Frequency of Treatment (PT) 5 times per week  -LB 5 times per week  -RG    Estimated Duration of Therapy (PT) 1 week;2 weeks  -LB 1 week;2 weeks  -RG    Problem List (PT) balance;mobility;range of motion (ROM);strength;pain  -LB balance;mobility;range of motion (ROM);strength;pain  -RG    Activity Limitations Related to Problem List (PT) unable to ambulate safely;unable to transfer safely  -LB unable to ambulate safely;unable to transfer safely  -RG    Row Name 06/06/22 1456          Daily Progress Summary (PT)    Recommendations (PT) continue PT  -LB     Row Name 06/06/22 1456 06/06/22 1428       Therapy Plan Review/Discharge Plan (PT)    Anticipated Equipment Needs at Discharge (PT Eval) --  tbd  -LB --  tbd  -RG    Anticipated Discharge Disposition (PT) home with home health  -LB home with home health  -RG    Row Name 06/06/22 1456 06/06/22 1428       IRF PT Goals    Bed Mobility Goal Selection (PT-IRF) bed mobility, PT goal 1  -LB bed mobility, PT goal 1  -RG    Transfer Goal Selection (PT-IRF) transfers, PT goal 1  -LB transfers, PT goal 1  -RG    Gait (Walking Locomotion) Goal Selection (PT-IRF) gait, PT goal 1  -LB gait, PT goal 1  -RG    Row Name 06/06/22 1456 06/06/22 1428       Bed Mobility Goal 1 (PT-IRF)    Activity/Assistive Device (Bed Mobility Goal 1, PT-IRF) sit to supine/supine to sit  -LB sit to supine/supine to sit  -RG    [place] Level (Bed Mobility Goal 1, PT-IRF) modified independence  -LB modified independence  -RG    Time Frame (Bed Mobility Goal 1, PT-IRF) by discharge  -LB by discharge  -RG    Row Name 06/06/22 1456 06/06/22 1428       Transfer Goal 1 (PT-IRF)    Activity/Assistive Device (Transfer Goal 1, PT-IRF) sit-to-stand/stand-to-sit;bed-to-chair/chair-to-bed  -LB sit-to-stand/stand-to-sit;bed-to-chair/chair-to-bed  -RG    [place] Level  (Transfer Goal 1, PT-IRF) supervision required  -LB supervision required  -RG    Time Frame (Transfer Goal 1, PT-IRF) by discharge  -LB by discharge  -RG    Row Name 06/06/22 1456 06/06/22 1428       Gait/Walking Locomotion Goal 1 (PT-IRF)    Activity/Assistive Device (Gait/Walking Locomotion Goal 1, PT-IRF) walker, rolling  -LB walker, rolling  -RG    Gait/Walking Locomotion Distance Goal 1 (PT-IRF) 2'  -LB 2'  -RG    [place] Level (Gait/Walking Locomotion Goal 1, PT-IRF) supervision required  -LB supervision required  -RG    Time Frame (Gait/Walking Locomotion Goal 1, PT-IRF) by discharge  -LB by discharge  -RG          User Key  (r) = Recorded By, (t) = Taken By, (c) = Cosigned By    Initials Name Provider Type    Willow Spencer, PT Physical Therapist    RG Michi Mckeon, PTA Physical Therapist Assistant              Wound 05/16/22 1340 coccyx Pressure Injury (Active)   Dressing Appearance open to air 06/06/22 1115   Closure None 06/05/22 1917   Base blanchable;red 06/06/22 1115   Periwound blanchable 06/06/22 1115   Periwound Temperature warm 06/06/22 1115   Periwound Skin Turgor soft 06/06/22 1115   Edges irregular 06/06/22 1115   Care, Wound barrier applied 06/06/22 1115   Dressing Care open to air 06/06/22 1115   Periwound Care barrier ointment applied 06/05/22 1917       Wound 05/18/22 1545 Left other (see comments) foot Incision (Active)   Dressing Appearance dry;intact 06/06/22 1115   Closure Pelzer;Sutures 06/05/22 1917   Base dressing in place, unable to visualize 06/06/22 1115   Periwound intact 06/05/22 1917   Periwound Temperature warm 06/05/22 1917   Periwound Skin Turgor firm 06/05/22 1803   Edges rolled/closed 06/05/22 1803   Drainage Amount none 06/05/22 1917   Care, Wound cleansed with 06/05/22 1917   Dressing Care dressing changed 06/05/22 1917       Wound 06/02/22 1240 scrotum MASD (Moisture associated skin damage) (Active)   Dressing Appearance open to air 06/06/22 8594    Closure Open to air 06/05/22 1917   Base red 06/06/22 1115     Physical Therapy Education                 Title: PT OT SLP Therapies (Done)     Topic: Physical Therapy (Done)     Point: Mobility training (Done)     Learning Progress Summary           Patient Acceptance, E, VU,NR by LB at 6/6/2022 1500    Acceptance, E,D, VU,NR by RG at 6/6/2022 1430    Eager, E,TB, DU,VU by HR at 6/4/2022 1157    Acceptance, E, VU,NR by LB at 6/3/2022 1430                   Point: Home exercise program (Done)     Learning Progress Summary           Patient Acceptance, E, VU,NR by LB at 6/6/2022 1500    Acceptance, E,D, VU,NR by RG at 6/6/2022 1430    Eager, E,TB, DU,VU by HR at 6/4/2022 1157    Acceptance, E, VU,NR by LB at 6/3/2022 1430                   Point: Body mechanics (Done)     Learning Progress Summary           Patient Acceptance, E, VU,NR by LB at 6/6/2022 1500    Acceptance, E,D, VU,NR by RG at 6/6/2022 1430    Eager, E,TB, DU,VU by HR at 6/4/2022 1157    Acceptance, E, VU,NR by LB at 6/3/2022 1430                   Point: Precautions (Done)     Learning Progress Summary           Patient Acceptance, E, VU,NR by LB at 6/6/2022 1500    Acceptance, E,D, VU,NR by RG at 6/6/2022 1430    Eager, E,TB, DU,VU by HR at 6/4/2022 1157    Acceptance, E, VU,NR by LB at 6/3/2022 1430                               User Key     Initials Effective Dates Name Provider Type Discipline    LB 06/16/21 -  Willow Villalta, PT Physical Therapist PT    RG 06/16/21 -  Michi Mckeon PTA Physical Therapist Assistant PT    HR 01/14/22 -  Hanna Murphy PTA Physical Therapist Assistant PT                PT Recommendation and Plan    Planned Therapy Interventions (PT): balance training, bed mobility training, gait training, patient/family education, ROM (range of motion), strengthening, transfer training, wheelchair management/propulsion training  Frequency of Treatment (PT): 5 times per week  Anticipated Equipment Needs at Discharge (PT  Eval):  (tbd)  Daily Progress Summary (PT)  Recommendations (PT): continue PT               Time Calculation:      PT Charges     Row Name 06/06/22 1501 06/06/22 1431          Time Calculation    Start Time 1000  -LB 1330  -RG     Stop Time 1045  -LB 1415  -RG     Time Calculation (min) 45 min  -LB 45 min  -RG     PT Received On 06/06/22  -LB 06/06/22  -RG            Time Calculation- PT    Total Timed Code Minutes- PT -- 45 minute(s)  -RG           User Key  (r) = Recorded By, (t) = Taken By, (c) = Cosigned By    Initials Name Provider Type    Willow Spencer, PT Physical Therapist    RG Michi Mckeon, PTA Physical Therapist Assistant                Therapy Charges for Today     Code Description Service Date Service Provider Modifiers Qty    81976258778 HC PT THERAPEUTIC ACT EA 15 MIN 6/6/2022 Willow Villalta, PT GP 2    36432152914 HC PT THER PROC EA 15 MIN 6/6/2022 Willow Villalta, PT GP 1                   Willow Villalta, PT  6/6/2022

## 2022-06-06 NOTE — PROGRESS NOTES
[facility]  PROGRESS NOTE     Patient Identification:  Name:  Melchor Hardwick  Age:  57 y.o.  Sex:  male  :  1965  MRN:  [id number]  Visit Number:  [id number]  ROOM: 109/2S     Primary Care Provider:  Missy Up APRN    Length of stay in inpatient status:  4    Subjective     Chief Compliant:  Critical illness myopathy    History of Presenting Illness: 57-year-old gentleman being treated for critical illness myopathy.  Patient is status post left midfoot amputation secondary to osteomyelitis with staphylococcal aureus bacteremia.  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      Awake and alert, examined while resting comfortably in bed.  Vital signs stable on room air.  Patient denies any complaints or issues.    Objective     Current Hospital Meds:aspirin, 81 mg, Oral, Daily  atorvastatin, 40 mg, Oral, Nightly  enoxaparin, 40 mg, Subcutaneous, Nightly  furosemide, 40 mg, Oral, BID  Insulin Aspart, 0-7 Units, Subcutaneous, TID AC  Insulin Aspart, 15 Units, Subcutaneous, TID AC  insulin detemir, 45 Units, Subcutaneous, Daily  metFORMIN, 500 mg, Oral, BID With Meals  multivitamin, 1 tablet, Oral, Daily  nadolol, 20 mg, Oral, Q24H  nystatin, , Topical, Q12H  pantoprazole, 40 mg, Oral, Q AM  potassium chloride, 40 mEq, Oral, Q4H  senna-docusate sodium, 2 tablet, Oral, BID  sodium chloride, 10 mL, Intravenous, Q12H  sodium chloride, 10 mL, Intravenous, Q12H  spironolactone, 50 mg, Oral, Daily  vancomycin, 1,750 mg, Intravenous, Q12H       ----------------------------------------------------------------------------------------------------------------------  Vital Signs:  Temp:  [98.1 °F (36.7 °C)-98.2 °F (36.8 °C)] 98.1 °F (36.7 °C)  Heart Rate:  [72-81] 81  Resp:  [18] 18  BP: (109-122)/(69-71) 109/69  SpO2:  [96 %-99 %] 99 %  on   ;   Device (Oxygen Therapy): room air  Body mass  index is 37.39 kg/m².    Wt Readings from Last 3 Encounters:   06/04/22 118 kg (260 lb 9.6 oz)   06/02/22 117 kg (258 lb)     Intake & Output (last 3 days)       06/03 0701 06/04 0700 06/04 0701  06/05 0700 06/05 0701 06/06 0700 06/06 0701  06/07 0700    P.O. 1800 1800 1800 360    I.V. (mL/kg) 500 (4.2)       IV Piggyback 1000 1000 500 500    Total Intake(mL/kg) 3300 (28) 2800 (23.7) 2300 (19.5) 860 (7.3)    Urine (mL/kg/hr) 4450 (1.6) 3950 (1.4) 4400 (1.6)     Stool 0 2 0     Total Output 4450 3952 4400     Net -1150 -1152 -2100 +860            Stool Unmeasured Occurrence 1 x  2 x         Diet Regular; Cardiac, Consistent Carbohydrate, Daily Fluid Restriction; 1500 mL Fluid Per Day; High Protein  ----------------------------------------------------------------------------------------------------------------------  Physical exam:  Constitutional:   Awake and alert, denies any complaints or issues.  No acute distress noted.  HEENT: Normocephalic atraumatic  Neck:    Supple  Cardiovascular: Regular rate and rhythm  Pulmonary/Chest: Clear to auscultation  Abdominal: Positive bowel sounds.  Abdomen rounded, soft.  Musculoskeletal: Status post left forefoot amputation--wound appears to be healing well at.  No erythema no drainage from the site  Neurological: Sensory change stocking glove distribution  Skin: No rash  Peripheral vascular: 2+ edema in bilateral lower extremities but improved.  Genitourinary: Scrotal edema improved  ----------------------------------------------------------------------------------------------------------------------    Last echocardiogram:  Results for orders placed during the hospital encounter of 05/10/22    Adult Transthoracic Echo Complete W/ Cont if Necessary Per Protocol    Interpretation Summary  · Normal left ventricular cavity size and wall thickness noted. All left ventricular wall segments contract normally.  · Left ventricular ejection fraction appears to be 61 - 65%.  · The  mitral valve is structurally normal with no significant stenosis present. Trace mitral valve regurgitation is present.  · The aortic valve is structurally normal with no regurgitation or stenosis present.  · There is no evidence of pericardial effusion.    ----------------------------------------------------------------------------------------------------------------------  Results from last 7 days   Lab Units 06/06/22  0040 06/04/22  0030   WBC 10*3/mm3 4.84 5.58   HEMOGLOBIN g/dL 9.4* 10.1*   HEMATOCRIT % 29.7* 31.8*   MCV fL 79.4 79.1   MCHC g/dL 31.6 31.8   PLATELETS 10*3/mm3 117* 140         Results from last 7 days   Lab Units 06/06/22  0040 06/04/22  1829 06/04/22  0030 06/01/22  0452 05/31/22  1140   SODIUM mmol/L 137  --  134* 134* 133*   POTASSIUM mmol/L 3.6 3.8 3.3* 3.7 3.9   MAGNESIUM mg/dL  --   --   --   --  1.9   CHLORIDE mmol/L 105  --  104 103 103   CO2 mmol/L 22.6  --  23.1 24.3 23.7   BUN mg/dL 18  --  21* 19 20   CREATININE mg/dL 0.73*  --  0.73* 0.70* 0.78   CALCIUM mg/dL 7.9*  --  8.1* 7.8* 7.7*   GLUCOSE mg/dL 84  --  86 174* 232*   ALBUMIN g/dL  --   --  1.89*  --   --    BILIRUBIN mg/dL  --   --  0.4  --   --    ALK PHOS U/L  --   --  293*  --   --    AST (SGOT) U/L  --   --  47*  --   --    ALT (SGPT) U/L  --   --  37  --   --    Estimated Creatinine Clearance: 143.7 mL/min (A) (by C-G formula based on SCr of 0.73 mg/dL (L)).  No results found for: AMMONIA              Glucose   Date/Time Value Ref Range Status   06/06/2022 0602 139 (H) 70 - 130 mg/dL Final     Comment:     Meter: [id number] : [id number] [place]   06/05/2022 1614 184 (H) 70 - 130 mg/dL Final     Comment:     Meter: [id number] : [id number] Christy Grant   06/05/2022 1109 130 70 - 130 mg/dL Final     Comment:     Meter: [id number] : [id number] Christy Grant   06/05/2022 0858 170 (H) 70 - 130 mg/dL Final     Comment:     Meter: [id number] : [id number] Steve Yancey   06/05/2022 0603 79 70 - 130 mg/dL  Final     Comment:     Meter: [id number] : [id number] [place]   06/04/2022 1638 138 (H) 70 - 130 mg/dL Final     Comment:     Meter: [id number] : [id number] Penelope Dawson   06/04/2022 1121 236 (H) 70 - 130 mg/dL Final     Comment:     Meter: [id number] : [id number] Penelope Eunice   06/04/2022 0643 90 70 - 130 mg/dL Final     Comment:     Meter: [id number] : [id number] Raul Wu     Lab Results   Component Value Date    TSH 4.700 (H) 05/30/2022    FREET4 0.87 (L) 05/30/2022     No results found for: PREGTESTUR, PREGSERUM, HCG, HCGQUANT  Pain Management Panel     Pain Management Panel Latest Ref Rng & Units 5/24/2022 5/11/2022    CREATININE UR mg/dL 91.0 -    AMPHETAMINES SCREEN, URINE Negative - Negative    BARBITURATES SCREEN Negative - Negative    BENZODIAZEPINE SCREEN, URINE Negative - Negative    BUPRENORPHINEUR Negative - Negative    COCAINE SCREEN, URINE Negative - Negative    METHADONE SCREEN, URINE Negative - Negative    METHAMPHETAMINEUR Negative - Negative        Brief Urine Lab Results  (Last result in the past 365 days)      Color   Clarity   Blood   Leuk Est   Nitrite   Protein   CREAT   Urine HCG        05/24/22 1839             91.0             No results found for: BLOODCX      No results found for: URINECX  No results found for: WOUNDCX  No results found for: STOOLCX        I have personally looked at the labs and they are summarized above.  ----------------------------------------------------------------------------------------------------------------------  Detailed radiology reports for the last 24 hours:    Imaging Results (Last 24 Hours)     ** No results found for the last 24 hours. **        Final impressions for the last 30 days of radiology reports:    CT Abdomen Pelvis Without Contrast    Result Date: 5/25/2022  1.  Advanced liver cirrhosis with changes of portal hypertension which include spinal megaly, prominent portosystemic collateral vessels,  small-moderate volume ascites, and marked anasarca. 2.  Moderate constipation and mild generalized ileus. No bowel obstruction. 3.  Miniscule pleural effusions. 4.  Other incidental and nonacute findings detailed above.  This report was finalized on 5/25/2022 8:48 AM by Dr. Brain Rodriguez MD.      XR Foot 3+ View Left    Result Date: 5/10/2022    Degenerative change of the midfoot.  This report was finalized on 5/10/2022 4:17 PM by Dr. Oswaldo Brown MD.      US Abdomen Complete    Result Date: 5/20/2022  1. Splenomegaly. 2. Small volume ascites.  This report was finalized on 5/20/2022 12:59 PM by Dr. Lobo Ring MD.      US Scrotum & Testicles    Result Date: 5/20/2022  Extensive scrotal wall swelling.   This report was finalized on 5/20/2022 12:59 PM by Dr. Lobo Ring MD.      US Scrotum & Testicles    Result Date: 5/16/2022  Scrotal wall thickening  This report was finalized on 5/16/2022 4:57 PM by Dr. Lobo Ring MD.      US Arterial Doppler Lower Extremity Left    Result Date: 5/10/2022  Abnormal examination demonstrating elevated flow velocities throughout the left lower extremity arterial system and biphasic flow within the left posterior tibial artery. Contemporaneously performed CT demonstrates imaging findings highly suspicious for gas gangrene and infection/osteomyelitis. CT dictated separately. Signer Name: Oscar Dixon MD  Signed: 5/10/2022 7:16 PM  Workstation Name: [facility]  [facility]    MRI Foot Left Without Contrast    Result Date: 5/16/2022  Appearance concerning for osteomyelitis involving the 3rd metatarsal and likely the adjacent cuneiform.  This report was finalized on 5/16/2022 3:10 PM by Dr. Lobo Ring MD.      US Venous Doppler Lower Extremity Bilateral (duplex)    Result Date: 5/24/2022  No sonographic findings of DVT in the lower extremities.  This report was finalized on 5/24/2022 8:05 AM by Dr. Garry oLvelace II, MD.      CT Lower Extremity Left  With Contrast    Result Date: 5/10/2022  Marked soft tissue edema and fat stranding at the level of the foot, diffuse with scattered foci of air both within soft tissues and bony structures. Findings are concerning for gas gangrene and osteomyelitis. Both the midfoot and forefoot are involved. Signer Name: ANTELMO COFFMAN MD  Signed: 5/10/2022 6:14 PM  [facility]  [facility]    XR Foreign Body For MRI    Result Date: 5/16/2022  No radiopaque foreign body identified  This report was finalized on 5/16/2022 10:20 AM by Dr. Lobo Ring MD.      I have personally looked at the radiology images and read the final radiology report.    Assessment & Plan    Critical illness myopathy--participated with physical and Occupational Therapy on 6/4/2022: Performs bed mobility with contact-guard assist and verbal/nonverbal cues; performs transfer activities with minimal assist and verbal/nonverbal cues; utilizes wheelchair/front wheeled walker for chair to bed, sit/stand/stand to sit transfer; participated with ADL retraining/education; functional mobility; functional activity tolerance; functional transfer required set up assist for self-feeding; set up assist for grooming    Status post left foot forefoot amputation--will consult Dr. Colby for follow-up of wound, Dr. Colby is unavailable until next Monday.  Continue IV antibiotic therapy    Staphylococcal aureus bacteremia--continue vancomycin through June 23    Generalized edema with scrotal edema-- continue Lasix 40 mg twice daily.  Scrotal edema improved    Cirrhosis secondary to [place]-- nadolol.  Likely benefit from outpatient GI evaluation May need screening for esophageal varices    Diabetes mellitus-- controlled continue Levemir and mealtime coverage    Anemia-- stable    Hypertension-- continue current treatment    Obesity with BMI of 37.39 kg per metered square    Hyperlipidemia-- continue statin therapy      VTE Prophylaxis:    Mechanical Order History:      Ordered        06/02/22 1707  Maintain Sequential Compression Device  Continuous                    Pharmalogical Order History:      Ordered     Dose Route Frequency Stop    06/02/22 1707  Enoxaparin Sodium (LOVENOX) syringe 40 mg         40 mg SC Nightly --                ROSALINA Friend  06/06/22  11:15 EDT

## 2022-06-06 NOTE — PROGRESS NOTES
Inpatient Rehabilitation Functional Measures Assessment and Plan of Care    Plan of Care  Self Care    [OT] Dressing (Lower)(Active)  Current Status(06/06/2022): Mod A  Weekly Goal(06/14/2022): Min A  Discharge Goal: Min A    Functional Measures  SHANNON Eating:  SHANNON Grooming:  SHANNON Bathing:  SHANNON Upper Body Dressing:  SHANNON Lower Body Dressing:  SHANNON Toileting:    SHANNON Bladder Management  Level of Assistance:  Frequency/Number of Accidents this Shift:    SHANNON Bowel Management  Level of Assistance:  Frequency/Number of Accidents this Shift:    SHANNON Bed/Chair/Wheelchair Transfer:  SHANNON Toilet Transfer:  SHANNON Tub/Shower Transfer:    Previously Documented Mode of Locomotion at Discharge:  SHANNON Expected Mode of Locomotion at Discharge:  SHANNON Walk/Wheelchair:  SHANNON Stairs:    SHANNON Comprehension:  SHANNON Expression:  SHANNON Social Interaction:  SHANNON Problem Solving:  SHANNON Memory:    Therapy Mode Minutes  Occupational Therapy: Individual: 90 minutes.  Physical Therapy:  Speech Language Pathology:    Discharge Functional Goals:    Signed by: Mandi Smith OT

## 2022-06-06 NOTE — THERAPY TREATMENT NOTE
Inpatient Rehabilitation - Physical Therapy Treatment Note        [place]     Patient Name: Melchor Hardwick  : 1965  MRN: [id number]    Today's Date: 2022                    Admit Date: 2022      Visit Dx:   No diagnosis found.    Patient Active Problem List   Diagnosis   • Hyperlipidemia   • Hypertensive disorder   • Kidney disease   • Obesity   • Type 2 diabetes mellitus (HCC)   • Gas gangrene of foot (HCC)   • Cellulitis in diabetic foot (HCC)   • Other acute osteomyelitis of left foot (HCC)   • Osteomyelitis (HCC)   • Critical illness myopathy       Past Medical History:   Diagnosis Date   • Diabetes mellitus (HCC)    • Elevated cholesterol    • Hyperlipidemia    • Hypertension        Past Surgical History:   Procedure Laterality Date   • ABSCESS DRAINAGE      PERINEUM   • AMPUTATION FOOT Left 2022    Procedure: AMPUTATION FOOT;  Surgeon: Addison Colby MD;  Location: [facility];  Service: Podiatry;  Laterality: Left;   • INCISION AND DRAINAGE LEG Left 05/10/2022    Procedure: INCISION AND DRAINAGE LOWER EXTREMITY;  Surgeon: Addison Colby MD;  Location: [facility];  Service: Podiatry;  Laterality: Left;   • WOUND DEBRIDEMENT Left 2022    Procedure: DEBRIDEMENT FOOT;  Surgeon: Addison Colby MD;  Location: [facility];  Service: Podiatry;  Laterality: Left;       PT ASSESSMENT (last 12 hours)     IRF PT Evaluation and Treatment     Row Name 22 1428          PT Time and Intention    Document Type daily treatment  -RG     Mode of Treatment individual therapy;physical therapy  -RG     Patient/Family/Caregiver Comments/Observations Pt and nursing in agreement for skilled PT on this date.  -RG     Row Name 22 1428          General Information    Patient Profile Reviewed yes  -RG     Existing Precautions/Restrictions fall;non-weight bearing  hernandez, <skin integrity L foot  -RG     Row Name 22 1428          Pain Scale: FACES Pre/Post-Treatment    Pain: FACES Scale,  Pretreatment 6-->hurts even more  -RG     Posttreatment Pain Rating 6-->hurts even more  -     Row Name 06/06/22 1428          Cognition/Psychosocial    Affect/Mental Status (Cognition) WFL  -RG     Follows Commands (Cognition) WFL  -RG     Personal Safety Interventions gait belt;nonskid shoes/slippers when out of bed;fall prevention program maintained  -[facility] Name 06/06/22 1428          Mobility    Left Lower Extremity (Weight-bearing Status) non weight-bearing (NWB)  -RG     Right Lower Extremity (Weight-bearing Status) weight-bearing as tolerated (WBAT)  -[facility] Name 06/06/22 1428          Bed Mobility    Supine-Sit-Supine [place] (Bed Mobility) contact guard;verbal cues;nonverbal cues (demo/gesture)  -[facility] Name 06/06/22 1428          Transfers    Chair-Bed [place] (Transfers) minimum assist (75% patient effort);verbal cues;nonverbal cues (demo/gesture);contact guard  -     Sit-Stand [place] (Transfers) verbal cues;nonverbal cues (demo/gesture);contact guard  -RG     Stand-Sit [place] (Transfers) verbal cues;nonverbal cues (demo/gesture);contact guard  -[facility] Name 06/06/22 1428          Chair-Bed Transfer    Assistive Device (Chair-Bed Transfers) wheelchair;walker, front-wheeled  -[facility] Name 06/06/22 1428          Sit-Stand Transfer    Assistive Device (Sit-Stand Transfers) parallel bars  -[facility] Name 06/06/22 1428          Stand-Sit Transfer    Assistive Device (Stand-Sit Transfers) [facility] bars  -[facility] Name 06/06/22 1428          Safety Issues, Functional Mobility    Impairments Affecting Function (Mobility) balance;endurance/activity tolerance;pain;strength  -[facility] Name 06/06/22 1428          Hip (Therapeutic Exercise)    Hip Strengthening (Therapeutic Exercise) bilateral;flexion;aBduction;aDduction;marching while seated;sitting;2 lb free weight;resistance band;green;10 repetitions;2 sets  -[facility] Name 06/06/22 1428          Knee (Therapeutic  Exercise)    Knee Strengthening (Therapeutic Exercise) bilateral;flexion;extension;marching while seated;LAQ (long arc quad);sitting;2 lb free weight;resistance band;green;10 repetitions;2 sets  -RG     Row Name 06/06/22 1428          Ankle (Therapeutic Exercise)    Ankle Strengthening (Therapeutic Exercise) dorsiflexion;plantarflexion;sitting;right;20 repititions  -RG     Row Name 06/06/22 1428          Positioning and Restraints    Pre-Treatment Position sitting in chair/recliner  -RG     Post Treatment Position bed  -RG     In Bed notified nsg;supine;call light within reach;encouraged to call for assist;legs elevated  -RG     Row Name 06/06/22 1428          Therapy Assessment/Plan (PT)    Patient's Goals For Discharge return home  -     Row Name 06/06/22 1428          Therapy Assessment/Plan (PT)    Rehab Potential/Prognosis (PT) adequate, monitor progress closely  -RG     Frequency of Treatment (PT) 5 times per week  -RG     Estimated Duration of Therapy (PT) 1 week;2 weeks  -RG     Problem List (PT) balance;mobility;range of motion (ROM);strength;pain  -RG     Activity Limitations Related to Problem List (PT) unable to ambulate safely;unable to transfer safely  -RG     Row Name 06/06/22 1428          Therapy Plan Review/Discharge Plan (PT)    Anticipated Equipment Needs at Discharge (PT Eval) --  tbd  -RG     Anticipated Discharge Disposition (PT) home with home health  -     Row Name 06/06/22 1428          IRF PT Goals    Bed Mobility Goal Selection (PT-IRF) bed mobility, PT goal 1  -RG     Transfer Goal Selection (PT-IRF) transfers, PT goal 1  -RG     Gait (Walking Locomotion) Goal Selection (PT-IRF) gait, PT goal 1  -RG     Row Name 06/06/22 1428          Bed Mobility Goal 1 (PT-IRF)    Activity/Assistive Device (Bed Mobility Goal 1, PT-IRF) sit to supine/supine to sit  -RG     [place] Level (Bed Mobility Goal 1, PT-IRF) modified independence  -RG     Time Frame (Bed Mobility Goal 1, PT-IRF) by  discharge  -RG     Row Name 06/06/22 1428          Transfer Goal 1 (PT-IRF)    Activity/Assistive Device (Transfer Goal 1, PT-IRF) sit-to-stand/stand-to-sit;bed-to-chair/chair-to-bed  -RG     [place] Level (Transfer Goal 1, PT-IRF) supervision required  -RG     Time Frame (Transfer Goal 1, PT-IRF) by discharge  -RG     Row Name 06/06/22 1428          Gait/Walking Locomotion Goal 1 (PT-IRF)    Activity/Assistive Device (Gait/Walking Locomotion Goal 1, PT-IRF) walker, rolling  -RG     Gait/Walking Locomotion Distance Goal 1 (PT-IRF) 2'  -RG     [place] Level (Gait/Walking Locomotion Goal 1, PT-IRF) supervision required  -RG     Time Frame (Gait/Walking Locomotion Goal 1, PT-IRF) by discharge  -RG           User Key  (r) = Recorded By, (t) = Taken By, (c) = Cosigned By    Initials Name Provider Type    RG Michi Mckeon PTA Physical Therapist Assistant              Wound 05/16/22 1340 coccyx Pressure Injury (Active)   Dressing Appearance open to air 06/06/22 1115   Closure None 06/05/22 1917   Base blanchable;red 06/06/22 1115   Periwound blanchable 06/06/22 1115   Periwound Temperature warm 06/06/22 1115   Periwound Skin Turgor soft 06/06/22 1115   Edges irregular 06/06/22 1115   Care, Wound barrier applied 06/06/22 1115   Dressing Care open to air 06/06/22 1115   Periwound Care barrier ointment applied 06/05/22 1917       Wound 05/18/22 1545 Left other (see comments) foot Incision (Active)   Dressing Appearance dry;intact 06/06/22 1115   Closure [place];Sutures 06/05/22 1917   Base dressing in place, unable to visualize 06/06/22 1115   Periwound intact 06/05/22 1917   Periwound Temperature warm 06/05/22 1917   Periwound Skin Turgor firm 06/05/22 1803   Edges rolled/closed 06/05/22 1803   Drainage Amount none 06/05/22 1917   Care, Wound cleansed with 06/05/22 1917   Dressing Care dressing changed 06/05/22 1917       Wound 06/02/22 1240 scrotum MASD (Moisture associated skin damage) (Active)   Dressing  Appearance open to air 06/06/22 1115   Closure Open to air 06/05/22 1917   Base red 06/06/22 1115     Physical Therapy Education                 Title: PT OT SLP Therapies (Done)     Topic: Physical Therapy (Done)     Point: Mobility training (Done)     Learning Progress Summary           Patient Acceptance, E,D, VU,NR by RG at 6/6/2022 1430    Eager, E,TB, DU,VU by HR at 6/4/2022 1157    Acceptance, E, VU,NR by LB at 6/3/2022 1430                   Point: Home exercise program (Done)     Learning Progress Summary           Patient Acceptance, E,D, VU,NR by RG at 6/6/2022 1430    Eager, E,TB, DU,VU by HR at 6/4/2022 1157    Acceptance, E, VU,NR by LB at 6/3/2022 1430                   Point: Body mechanics (Done)     Learning Progress Summary           Patient Acceptance, E,D, VU,NR by RG at 6/6/2022 1430    Eager, E,TB, DU,VU by HR at 6/4/2022 1157    Acceptance, E, VU,NR by LB at 6/3/2022 1430                   Point: Precautions (Done)     Learning Progress Summary           Patient Acceptance, E,D, VU,NR by RG at 6/6/2022 1430    Eager, E,TB, DU,VU by HR at 6/4/2022 1157    Acceptance, E, VU,NR by LB at 6/3/2022 1430                               User Key     Initials Effective Dates Name Provider Type Discipline    LB 06/16/21 -  Willow Villalta, PT Physical Therapist PT    RG 06/16/21 -  Michi Mckeon PTA Physical Therapist Assistant PT    HR 01/14/22 -  Hanna Murphy PTA Physical Therapist Assistant PT                PT Recommendation and Plan    Frequency of Treatment (PT): 5 times per week  Anticipated Equipment Needs at Discharge (PT Eval):  (tbd)                  Time Calculation:      PT Charges     Row Name 06/06/22 1431             Time Calculation    Start Time 1330  -RG      Stop Time 1415  -RG      Time Calculation (min) 45 min  -RG      PT Received On 06/06/22  -RG              Time Calculation- PT    Total Timed Code Minutes- PT 45 minute(s)  -RG            User Key  (r) = Recorded By, (t)  = Taken By, (c) = Cosigned By    Initials Name Provider Type    Michi White PTA Physical Therapist Assistant                Therapy Charges for Today     Code Description Service Date Service Provider Modifiers Qty    62777089578 HC PT THERAPEUTIC ACT EA 15 MIN 6/6/2022 Michi Mckeon PTA GP, CQ 1    [id number]  PT THER PROC EA 15 MIN 6/6/2022 Michi Mckeon PTA GP, CQ 2                   Michi Mckeon PTA  6/6/2022

## 2022-06-06 NOTE — NURSING NOTE
1100 clamp hernandez , 1300 unclamp hernandez , 1500 clamp hernandez , 1700 unclamp hernandez , 1900 clamp hernandez and passed info on in report to lupe ochoa.

## 2022-06-06 NOTE — THERAPY TREATMENT NOTE
Inpatient Rehabilitation - Occupational Therapy Treatment Note     Eder     Patient Name: Melchor Hardwick  : 1965  MRN: [id number]    Today's Date: 2022                 Admit Date: 2022       No diagnosis found.    Patient Active Problem List   Diagnosis   • Hyperlipidemia   • Hypertensive disorder   • Kidney disease   • Obesity   • Type 2 diabetes mellitus (HCC)   • Gas gangrene of foot (HCC)   • Cellulitis in diabetic foot (HCC)   • Other acute osteomyelitis of left foot (HCC)   • Osteomyelitis (HCC)   • Critical illness myopathy       Past Medical History:   Diagnosis Date   • Diabetes mellitus (HCC)    • Elevated cholesterol    • Hyperlipidemia    • Hypertension        Past Surgical History:   Procedure Laterality Date   • ABSCESS DRAINAGE      PERINEUM   • AMPUTATION FOOT Left 2022    Procedure: AMPUTATION FOOT;  Surgeon: Addison Colby MD;  Location: [facility];  Service: Podiatry;  Laterality: Left;   • INCISION AND DRAINAGE LEG Left 05/10/2022    Procedure: INCISION AND DRAINAGE LOWER EXTREMITY;  Surgeon: Addison Colby MD;  Location: [facility];  Service: Podiatry;  Laterality: Left;   • WOUND DEBRIDEMENT Left 2022    Procedure: DEBRIDEMENT FOOT;  Surgeon: Addison Colby MD;  Location: [facility];  Service: Podiatry;  Laterality: Left;             IRF OT ASSESSMENT FLOWSHEET (last 12 hours)     IRF OT Evaluation and Treatment     Row Name 22 1418          OT Time and Intention    Document Type daily treatment  -BF     Mode of Treatment occupational therapy  -BF     Patient Effort good  -BF     Symptoms Noted During/After Treatment none  -BF     Row Name 22 1418          General Information    Existing Precautions/Restrictions fall;non-weight bearing;left  BRETT hernandez LLE  -BF     Row Name 22 1418          Cognition/Psychosocial    Orientation Status (Cognition) oriented x 3  -BF     Follows Commands (Cognition) WFL  -BF     Row Name 22 1418           Upper Body Dressing    [place] Level (Upper Body Dressing) set up assistance  -BF     Position (Upper Body Dressing) supported sitting  -BF     Row Name 06/06/22 1418          Grooming    [place] Level (Grooming) set up  -BF     Position (Grooming) supported sitting  -BF     Row Name 06/06/22 1418          Motor Skills    Motor Control/Coordination Interventions gross motor coordination activities;therapeutic exercise/ROM  BUE GMC therex, strengthening; BUE rickshaw 30 lbs X20X4; BUE PRE 20 mins; flexbar therex, 3 lb dowel therex  -     Row Name 06/06/22 1418          Positioning and Restraints    In Wheelchair sitting;with PT  after both sessions  -           User Key  (r) = Recorded By, (t) = Taken By, (c) = Cosigned By    Initials Name Effective Dates     Mandi Smith OT 06/16/21 -                  Occupational Therapy Education                 Title: PT OT SLP Therapies (Done)     Topic: Occupational Therapy (Done)     Point: ADL training (Done)     Description:   Instruct learner(s) on proper safety adaptation and remediation techniques during self care or transfers.   Instruct in proper use of assistive devices.              Learning Progress Summary           Patient Acceptance, E, VU,NR by  at 6/6/2022 1418    Acceptance, E, VU,NR by  at 6/3/2022 1347                   Point: Precautions (Done)     Description:   Instruct learner(s) on prescribed precautions during self-care and functional transfers.              Learning Progress Summary           Patient Acceptance, E, VU,NR by BF at 6/6/2022 1418    Acceptance, E, VU,NR by BF at 6/3/2022 1347                               User Key     Initials Effective Dates Name Provider Type Discipline     06/16/21 -  Mandi Smith OT Occupational Therapist OT                    OT Recommendation and Plan    Planned Therapy Interventions (OT): activity tolerance training, adaptive equipment training, BADL retraining,  ROM/therapeutic exercise, strengthening exercise, transfer/mobility retraining                    Time Calculation:      Time Calculation- OT     Row Name 06/06/22 1427 06/06/22 0930          Time Calculation- OT    OT Start Time 1230  -BF 0930  -BF     OT Stop Time 1330  -BF 1000  -BF     OT Time Calculation (min) 60 min  -BF 30 min  -BF     Total Timed Code Minutes- OT 60 minute(s)  -BF 30 minute(s)  -BF     OT Non-Billable Time (min) -- 10 min  -BF           User Key  (r) = Recorded By, (t) = Taken By, (c) = Cosigned By    Initials Name Provider Type    BF Mandi Smith OT Occupational Therapist              Therapy Charges for Today     Code Description Service Date Service Provider Modifiers Qty    70705387840 HC OT SELF CARE/MGMT/TRAIN EA 15 MIN 6/6/2022 Mandi Smith OT GO 1    [id number]  OT THER PROC EA 15 MIN 6/6/2022 Mandi Smith OT GO 3    [id number]  OT THERAPEUTIC ACT EA 15 MIN 6/6/2022 Mandi Smith OT GO 2                   Mandi Smith OT  6/6/2022

## 2022-06-07 LAB
GLUCOSE BLDC GLUCOMTR-MCNC: 112 MG/DL (ref 70–130)
GLUCOSE BLDC GLUCOMTR-MCNC: 248 MG/DL (ref 70–130)
GLUCOSE BLDC GLUCOMTR-MCNC: 250 MG/DL (ref 70–130)
GLUCOSE BLDC GLUCOMTR-MCNC: 71 MG/DL (ref 70–130)

## 2022-06-07 PROCEDURE — 25010000002 VANCOMYCIN 5 G RECONSTITUTED SOLUTION: Performed by: FAMILY MEDICINE

## 2022-06-07 PROCEDURE — 25010000002 ENOXAPARIN PER 10 MG: Performed by: FAMILY MEDICINE

## 2022-06-07 PROCEDURE — 63710000001 INSULIN DETEMIR PER 5 UNITS: Performed by: FAMILY MEDICINE

## 2022-06-07 PROCEDURE — 82962 GLUCOSE BLOOD TEST: CPT

## 2022-06-07 PROCEDURE — 97530 THERAPEUTIC ACTIVITIES: CPT

## 2022-06-07 PROCEDURE — 63710000001 INSULIN ASPART PER 5 UNITS: Performed by: FAMILY MEDICINE

## 2022-06-07 PROCEDURE — 97110 THERAPEUTIC EXERCISES: CPT

## 2022-06-07 PROCEDURE — 97110 THERAPEUTIC EXERCISES: CPT | Performed by: OCCUPATIONAL THERAPIST

## 2022-06-07 PROCEDURE — 97535 SELF CARE MNGMENT TRAINING: CPT | Performed by: OCCUPATIONAL THERAPIST

## 2022-06-07 RX ADMIN — Medication 10 ML: at 22:10

## 2022-06-07 RX ADMIN — INSULIN ASPART 3 UNITS: 100 INJECTION, SOLUTION INTRAVENOUS; SUBCUTANEOUS at 12:08

## 2022-06-07 RX ADMIN — DOCUSATE SODIUM 50 MG AND SENNOSIDES 8.6 MG 2 TABLET: 8.6; 5 TABLET, FILM COATED ORAL at 09:14

## 2022-06-07 RX ADMIN — NYSTATIN: 100000 POWDER TOPICAL at 22:17

## 2022-06-07 RX ADMIN — ENOXAPARIN SODIUM 40 MG: 40 INJECTION SUBCUTANEOUS at 22:11

## 2022-06-07 RX ADMIN — METFORMIN HYDROCHLORIDE 500 MG: 500 TABLET ORAL at 09:14

## 2022-06-07 RX ADMIN — DOCUSATE SODIUM 50 MG AND SENNOSIDES 8.6 MG 2 TABLET: 8.6; 5 TABLET, FILM COATED ORAL at 22:11

## 2022-06-07 RX ADMIN — Medication 10 ML: at 22:12

## 2022-06-07 RX ADMIN — INSULIN DETEMIR 45 UNITS: 100 INJECTION, SOLUTION SUBCUTANEOUS at 09:18

## 2022-06-07 RX ADMIN — NYSTATIN 1 APPLICATION: 100000 POWDER TOPICAL at 09:35

## 2022-06-07 RX ADMIN — HYDROCODONE BITARTRATE AND ACETAMINOPHEN 1 TABLET: 7.5; 325 TABLET ORAL at 19:25

## 2022-06-07 RX ADMIN — ATORVASTATIN CALCIUM 40 MG: 40 TABLET, FILM COATED ORAL at 22:11

## 2022-06-07 RX ADMIN — METFORMIN HYDROCHLORIDE 500 MG: 500 TABLET ORAL at 17:17

## 2022-06-07 RX ADMIN — SPIRONOLACTONE 50 MG: 25 TABLET ORAL at 09:15

## 2022-06-07 RX ADMIN — Medication 10 ML: at 09:17

## 2022-06-07 RX ADMIN — VANCOMYCIN HYDROCHLORIDE 1750 MG: 5 INJECTION, POWDER, LYOPHILIZED, FOR SOLUTION INTRAVENOUS at 09:35

## 2022-06-07 RX ADMIN — NADOLOL 20 MG: 40 TABLET ORAL at 09:15

## 2022-06-07 RX ADMIN — FUROSEMIDE 40 MG: 40 TABLET ORAL at 09:14

## 2022-06-07 RX ADMIN — FUROSEMIDE 40 MG: 40 TABLET ORAL at 17:18

## 2022-06-07 RX ADMIN — VANCOMYCIN HYDROCHLORIDE 1750 MG: 5 INJECTION, POWDER, LYOPHILIZED, FOR SOLUTION INTRAVENOUS at 22:10

## 2022-06-07 RX ADMIN — ASPIRIN 81 MG: 81 TABLET, COATED ORAL at 09:14

## 2022-06-07 RX ADMIN — Medication 10 ML: at 09:20

## 2022-06-07 RX ADMIN — INSULIN ASPART 15 UNITS: 100 INJECTION, SOLUTION INTRAVENOUS; SUBCUTANEOUS at 09:18

## 2022-06-07 RX ADMIN — PANTOPRAZOLE SODIUM 40 MG: 40 TABLET, DELAYED RELEASE ORAL at 05:03

## 2022-06-07 RX ADMIN — Medication 1 TABLET: at 09:14

## 2022-06-07 RX ADMIN — INSULIN ASPART 15 UNITS: 100 INJECTION, SOLUTION INTRAVENOUS; SUBCUTANEOUS at 12:08

## 2022-06-07 NOTE — SIGNIFICANT NOTE
06/07/22 9424   Plan   Plan Team conference held today.  Spoke to pt and sister Ros about discharge on 6-16-22 and how he is doing in therapy.  Pt says he received call from Social Security office for disability application.  Pt will go home with sister at discharge who will assist with wound care and I.V. antibiotics.  Pt is receiving I.V. Vancomycin 1750 mg every 12 hours until 6-23-22.  Pt does not have preference for home health or home infusion pharmacy.  [facility] is preferred for DME. Will follow.   Patient/Family in Agreement with Plan yes

## 2022-06-07 NOTE — THERAPY TREATMENT NOTE
Inpatient Rehabilitation - Occupational Therapy Treatment Note     Eder     Patient Name: Melchor Hardwick  : 1965  MRN: [id number]    Today's Date: 2022                 Admit Date: 2022       No diagnosis found.    Patient Active Problem List   Diagnosis   • Hyperlipidemia   • Hypertensive disorder   • Kidney disease   • Obesity   • Type 2 diabetes mellitus (HCC)   • Gas gangrene of foot (HCC)   • Cellulitis in diabetic foot (HCC)   • Other acute osteomyelitis of left foot (HCC)   • Osteomyelitis (HCC)   • Critical illness myopathy       Past Medical History:   Diagnosis Date   • Diabetes mellitus (HCC)    • Elevated cholesterol    • Hyperlipidemia    • Hypertension        Past Surgical History:   Procedure Laterality Date   • ABSCESS DRAINAGE      PERINEUM   • AMPUTATION FOOT Left 2022    Procedure: AMPUTATION FOOT;  Surgeon: Addison Colby MD;  Location: [facility];  Service: Podiatry;  Laterality: Left;   • INCISION AND DRAINAGE LEG Left 05/10/2022    Procedure: INCISION AND DRAINAGE LOWER EXTREMITY;  Surgeon: Addison Colby MD;  Location: [facility];  Service: Podiatry;  Laterality: Left;   • WOUND DEBRIDEMENT Left 2022    Procedure: DEBRIDEMENT FOOT;  Surgeon: Addison Colby MD;  Location: [facility];  Service: Podiatry;  Laterality: Left;             IRF OT ASSESSMENT FLOWSHEET (last 12 hours)     IRF OT Evaluation and Treatment     Row Name 22 1232          OT Time and Intention    Document Type daily treatment  -BF     Mode of Treatment occupational therapy  -BF     Patient Effort good  -BF     Symptoms Noted During/After Treatment none  -BF     Row Name 22 1232          General Information    Existing Precautions/Restrictions fall;non-weight bearing;other (see comments)  BRETT espinoza, < skin integrity  -BF     Limitations/Impairments safety/cognitive  -BF     Row Name 22 1232          Cognition/Psychosocial    Orientation Status (Cognition) oriented x  3  -BF     Follows Commands (Cognition) WFL  -BF     Row Name 06/07/22 1232          Bathing    [place] Level (Bathing) contact guard assist;verbal cues  -BF     Position (Bathing) supported sitting  -BF     Comment (Bathing) CGA  -BF     Row Name 06/07/22 1232          Upper Body Dressing    [place] Level (Upper Body Dressing) set up assistance  -BF     Position (Upper Body Dressing) supported sitting  -BF     Comment (Upper Body Dressing) Set-up  -BF     Row Name 06/07/22 1232          Lower Body Dressing    [place] Level (Lower Body Dressing) contact guard assist;verbal cues  -BF     Position (Lower Body Dressing) supported sitting  -BF     Comment (Lower Body Dressing) CGA  -BF     Row Name 06/07/22 1232          Grooming    [place] Level (Grooming) set up  -BF     Position (Grooming) supported sitting  -BF     Comment (Grooming) Set-up  -BF     Row Name 06/07/22 1232          Motor Skills    Motor Control/Coordination Interventions gross motor coordination activities;therapeutic exercise/ROM  BUE GMC therex, strengthening; BUE PRE 20 mins; BUE rickshaw 30 lbs X20X5  -BF           User Key  (r) = Recorded By, (t) = Taken By, (c) = Cosigned By    Initials Name Effective Dates    BF Mandi Smith OT 06/16/21 -                  Occupational Therapy Education                 Title: PT OT SLP Therapies (Done)     Topic: Occupational Therapy (Done)     Point: ADL training (Done)     Description:   Instruct learner(s) on proper safety adaptation and remediation techniques during self care or transfers.   Instruct in proper use of assistive devices.              Learning Progress Summary           Patient Acceptance, E, VU,NR by BF at 6/7/2022 1232    Acceptance, E, VU,NR by BF at 6/6/2022 1418    Acceptance, E, VU,NR by BF at 6/3/2022 1347                   Point: Precautions (Done)     Description:   Instruct learner(s) on prescribed precautions during self-care and functional transfers.               Learning Progress Summary           Patient Acceptance, E, VU,NR by BF at 6/7/2022 1232    Acceptance, E, VU,NR by BF at 6/6/2022 1418    Acceptance, E, VU,NR by BF at 6/3/2022 1347                               User Key     Initials Effective Dates Name Provider Type Discipline     06/16/21 -  Mandi Smith, OT Occupational Therapist OT                    OT Recommendation and Plan    Planned Therapy Interventions (OT): activity tolerance training, adaptive equipment training, BADL retraining, ROM/therapeutic exercise, strengthening exercise, transfer/mobility retraining                    Time Calculation:      Time Calculation- OT     Row Name 06/07/22 1236             Time Calculation- OT    OT Start Time 0805  -BF      OT Stop Time 0935  -BF      OT Time Calculation (min) 90 min  -BF      Total Timed Code Minutes- OT 90 minute(s)  -BF      OT Non-Billable Time (min) 10 min  -BF            User Key  (r) = Recorded By, (t) = Taken By, (c) = Cosigned By    Initials Name Provider Type     Mandi Smtih, OT Occupational Therapist              Therapy Charges for Today     Code Description Service Date Service Provider Modifiers Qty    92079543260 HC OT SELF CARE/MGMT/TRAIN EA 15 MIN 6/6/2022 Mandi Smith OT GO 1    20659021871 HC OT THER PROC EA 15 MIN 6/6/2022 Mandi Smith OT GO 3    65352599822 HC OT THERAPEUTIC ACT EA 15 MIN 6/6/2022 Mandi Smith, OT GO 2    62892204624 HC OT SELF CARE/MGMT/TRAIN EA 15 MIN 6/7/2022 Mandi Smith OT GO 3    07518756505 HC OT THER PROC EA 15 MIN 6/7/2022 Mandi Smith, OT GO 3                   Mandi Smith, OT  6/7/2022

## 2022-06-07 NOTE — PROGRESS NOTES
Occupational Therapy: Individual: 90 minutes.    Physical Therapy:    Speech Language Pathology:    Signed by: Mandi Smith OT

## 2022-06-07 NOTE — PROGRESS NOTES
[facility]  PROGRESS NOTE     Patient Identification:  Name:  Melchor Hardwick  Age:  57 y.o.  Sex:  male  :  1965  MRN:  [id number]  Visit Number:  [id number]  ROOM: 109/2S     Primary Care Provider:  Missy Up APRN    Length of stay in inpatient status:  5    Subjective     Chief Compliant:  Critical illness myopathy    History of Presenting Illness: 57-year-old gentleman being treated for critical illness myopathy.  Patient is status post left midfoot amputation secondary to osteomyelitis with staphylococcal aureus bacteremia.  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      Patient is awake and alert, resting comfortably in wheelchair.  States he feels pretty good overall this morning.  Denies any complaints.  Vital signs stable on room air.  Nursing changed the left foot dressing and reported the wound is healing well.    Objective     Current Hospital Meds:aspirin, 81 mg, Oral, Daily  atorvastatin, 40 mg, Oral, Nightly  enoxaparin, 40 mg, Subcutaneous, Nightly  furosemide, 40 mg, Oral, BID  Insulin Aspart, 0-7 Units, Subcutaneous, TID AC  Insulin Aspart, 15 Units, Subcutaneous, TID AC  insulin detemir, 45 Units, Subcutaneous, Daily  metFORMIN, 500 mg, Oral, BID With Meals  multivitamin, 1 tablet, Oral, Daily  nadolol, 20 mg, Oral, Q24H  nystatin, , Topical, Q12H  pantoprazole, 40 mg, Oral, Q AM  senna-docusate sodium, 2 tablet, Oral, BID  sodium chloride, 10 mL, Intravenous, Q12H  sodium chloride, 10 mL, Intravenous, Q12H  spironolactone, 50 mg, Oral, Daily  vancomycin, 1,750 mg, Intravenous, Q12H       ----------------------------------------------------------------------------------------------------------------------  Vital Signs:  Temp:  [97.9 °F (36.6 °C)-98.4 °F (36.9 °C)] 97.9 °F (36.6 °C)  Heart Rate:  [75] 75  Resp:  [18] 18  BP: (107-119)/(65-70) 119/70  SpO2:   [93 %-97 %] 93 %  on   ;   Device (Oxygen Therapy): room air  Body mass index is 37.39 kg/m².    Wt Readings from Last 3 Encounters:   06/04/22 118 kg (260 lb 9.6 oz)   06/02/22 117 kg (258 lb)     Intake & Output (last 3 days)       06/04 0701  06/05 0700 06/05 0701  06/06 0700 06/06 0701 06/07 0700 06/07 0701 06/08 0700    P.O. 1800 1800 1440 480    I.V. (mL/kg)        IV Piggyback 1000 500 500 500    Total Intake(mL/kg) 2800 (23.7) 2300 (19.5) 1940 (16.4) 980 (8.3)    Urine (mL/kg/hr) 3950 (1.4) 4400 (1.6) 5100 (1.8)     Stool 2 0      Total Output 3952 4400 5100     Net -8172 -2511 -5163 +980            Stool Unmeasured Occurrence  2 x          Diet Regular; Cardiac, Consistent Carbohydrate, Daily Fluid Restriction; 1500 mL Fluid Per Day; High Protein  ----------------------------------------------------------------------------------------------------------------------  Physical exam:  Constitutional:   Sitting up comfortably in wheelchair, has been participating with therapy without difficulty.  No acute distress noted.  HEENT: Normocephalic atraumatic  Neck:    Supple  Cardiovascular: Regular rate and rhythm  Pulmonary/Chest: Clear to auscultation  Abdominal: Positive bowel sounds.  Abdomen rounded, soft.  Musculoskeletal: Status post left forefoot amputation--wound appears to be healing well with no surrounding erythema or drainage  Neurological: Sensory change stocking glove distribution  Skin: No rash  Peripheral vascular: 2+ dependent edema in bilateral lower extremities but improved.  Genitourinary: Scrotal edema improved  ----------------------------------------------------------------------------------------------------------------------    Last echocardiogram:  Results for orders placed during the hospital encounter of 05/10/22    Adult Transthoracic Echo Complete W/ Cont if Necessary Per Protocol    Interpretation Summary  · Normal left ventricular cavity size and wall thickness noted. All left  ventricular wall segments contract normally.  · Left ventricular ejection fraction appears to be 61 - 65%.  · The mitral valve is structurally normal with no significant stenosis present. Trace mitral valve regurgitation is present.  · The aortic valve is structurally normal with no regurgitation or stenosis present.  · There is no evidence of pericardial effusion.    ----------------------------------------------------------------------------------------------------------------------  Results from last 7 days   Lab Units 06/06/22  0040 06/04/22  0030   WBC 10*3/mm3 4.84 5.58   HEMOGLOBIN g/dL 9.4* 10.1*   HEMATOCRIT % 29.7* 31.8*   MCV fL 79.4 79.1   MCHC g/dL 31.6 31.8   PLATELETS 10*3/mm3 117* 140         Results from last 7 days   Lab Units 06/06/22  1710 06/06/22  0040 06/04/22  1829 06/04/22  0030 06/01/22  0452 05/31/22  1140   SODIUM mmol/L  --  137  --  134* 134* 133*   POTASSIUM mmol/L 4.0 3.6 3.8 3.3* 3.7 3.9   MAGNESIUM mg/dL  --   --   --   --   --  1.9   CHLORIDE mmol/L  --  105  --  104 103 103   CO2 mmol/L  --  22.6  --  23.1 24.3 23.7   BUN mg/dL  --  18  --  21* 19 20   CREATININE mg/dL  --  0.73*  --  0.73* 0.70* 0.78   CALCIUM mg/dL  --  7.9*  --  8.1* 7.8* 7.7*   GLUCOSE mg/dL  --  84  --  86 174* 232*   ALBUMIN g/dL  --   --   --  1.89*  --   --    BILIRUBIN mg/dL  --   --   --  0.4  --   --    ALK PHOS U/L  --   --   --  293*  --   --    AST (SGOT) U/L  --   --   --  47*  --   --    ALT (SGPT) U/L  --   --   --  37  --   --    Estimated Creatinine Clearance: 143.7 mL/min (A) (by C-G formula based on SCr of 0.73 mg/dL (L)).  No results found for: AMMONIA              Glucose   Date/Time Value Ref Range Status   06/07/2022 0907 250 (H) 70 - 130 mg/dL Final     Comment:     Meter: [id number] : [id number] Steve Yancey   06/07/2022 0604 112 70 - 130 mg/dL Final     Comment:     Meter: [id number] : [id number] [place] Crystal   06/06/2022 1636 127 70 - 130 mg/dL Final     Comment:     Meter:  [id number] : [id number] lisa ramirez   06/06/2022 1120 91 70 - 130 mg/dL Final     Comment:     Meter: [id number] : [id number] crystal zoie   06/06/2022 0602 139 (H) 70 - 130 mg/dL Final     Comment:     Meter: [id number] : [id number] Tracey Crystal   06/05/2022 1614 184 (H) 70 - 130 mg/dL Final     Comment:     Meter: [id number] : [id number] Wender Juanita   06/05/2022 1109 130 70 - 130 mg/dL Final     Comment:     Meter: [id number] : [id number] Wender Juanita   06/05/2022 0858 170 (H) 70 - 130 mg/dL Final     Comment:     Meter: [id number] : [id number] Stevethiago Yancey     Lab Results   Component Value Date    TSH 4.700 (H) 05/30/2022    FREET4 0.87 (L) 05/30/2022     No results found for: PREGTESTUR, PREGSERUM, HCG, HCGQUANT  Pain Management Panel     Pain Management Panel Latest Ref Rng & Units 5/24/2022 5/11/2022    CREATININE UR mg/dL 91.0 -    AMPHETAMINES SCREEN, URINE Negative - Negative    BARBITURATES SCREEN Negative - Negative    BENZODIAZEPINE SCREEN, URINE Negative - Negative    BUPRENORPHINEUR Negative - Negative    COCAINE SCREEN, URINE Negative - Negative    METHADONE SCREEN, URINE Negative - Negative    METHAMPHETAMINEUR Negative - Negative        Brief Urine Lab Results  (Last result in the past 365 days)      Color   Clarity   Blood   Leuk Est   Nitrite   Protein   CREAT   Urine HCG        05/24/22 1839             91.0             No results found for: BLOODCX      No results found for: URINECX  No results found for: WOUNDCX  No results found for: STOOLCX        I have personally looked at the labs and they are summarized above.  ----------------------------------------------------------------------------------------------------------------------  Detailed radiology reports for the last 24 hours:    Imaging Results (Last 24 Hours)     ** No results found for the last 24 hours. **        Final impressions for the last 30 days of radiology reports:    CT Abdomen Pelvis  Without Contrast    Result Date: 5/25/2022  1.  Advanced liver cirrhosis with changes of portal hypertension which include spinal megaly, prominent portosystemic collateral vessels, small-moderate volume ascites, and marked anasarca. 2.  Moderate constipation and mild generalized ileus. No bowel obstruction. 3.  Miniscule pleural effusions. 4.  Other incidental and nonacute findings detailed above.  This report was finalized on 5/25/2022 8:48 AM by Dr. Brain Rodriguez MD.      XR Foot 3+ View Left    Result Date: 5/10/2022    Degenerative change of the midfoot.  This report was finalized on 5/10/2022 4:17 PM by Dr. Oswaldo Brown MD.      US Abdomen Complete    Result Date: 5/20/2022  1. Splenomegaly. 2. Small volume ascites.  This report was finalized on 5/20/2022 12:59 PM by Dr. Lobo Ring MD.      US Scrotum & Testicles    Result Date: 5/20/2022  Extensive scrotal wall swelling.   This report was finalized on 5/20/2022 12:59 PM by Dr. Lobo Ring MD.      US Scrotum & Testicles    Result Date: 5/16/2022  Scrotal wall thickening  This report was finalized on 5/16/2022 4:57 PM by Dr. Lobo Ring MD.      US Arterial Doppler Lower Extremity Left    Result Date: 5/10/2022  Abnormal examination demonstrating elevated flow velocities throughout the left lower extremity arterial system and biphasic flow within the left posterior tibial artery. Contemporaneously performed CT demonstrates imaging findings highly suspicious for gas gangrene and infection/osteomyelitis. CT dictated separately. Signer Name: Oscar Dixon MD  Signed: 5/10/2022 7:16 PM  Workstation Name: [facility]  [facility]    MRI Foot Left Without Contrast    Result Date: 5/16/2022  Appearance concerning for osteomyelitis involving the 3rd metatarsal and likely the adjacent cuneiform.  This report was finalized on 5/16/2022 3:10 PM by Dr. Lobo Ring MD.      US Venous Doppler Lower Extremity Bilateral (duplex)    Result  Date: 5/24/2022  No sonographic findings of DVT in the lower extremities.  This report was finalized on 5/24/2022 8:05 AM by Dr. Garry Lovelace II, MD.      CT Lower Extremity Left With Contrast    Result Date: 5/10/2022  Marked soft tissue edema and fat stranding at the level of the foot, diffuse with scattered foci of air both within soft tissues and bony structures. Findings are concerning for gas gangrene and osteomyelitis. Both the midfoot and forefoot are involved. Signer Name: ANTELMO COFFMAN MD  Signed: 5/10/2022 6:14 PM  Workstation Name: [id number]  [facility]    XR Foreign Body For MRI    Result Date: 5/16/2022  No radiopaque foreign body identified  This report was finalized on 5/16/2022 10:20 AM by Dr. Lobo Ring MD.      I have personally looked at the radiology images and read the final radiology report.    Assessment & Plan    Critical illness myopathy--participated with physical and Occupational Therapy on 6/6/2022: Performed bed mobility with contact-guard assist and verbal/nonverbal cues; performed transfer activities with contact-guard assist and verbal/nonverbal cues; utilizes wheelchair/front wheeled walker for chair to bed, sit/stand/stand sit transfer; required set up assist for upper body dressing; set up assist for grooming activity; participated with gross motor coordination activities; therapeutic exercise/ROM    Status post left foot forefoot amputation-- Dr. Colby is unavailable until next Monday.  Continue IV antibiotic therapy    Staphylococcal aureus bacteremia--continue vancomycin through June 23    Generalized edema with scrotal edema-- continue Lasix 40 mg twice daily.  Scrotal edema improved    Cirrhosis secondary to [place]-- nadolol.  Likely benefit from outpatient GI evaluation May need screening for esophageal varices    Diabetes mellitus-- controlled continue Levemir and mealtime coverage    Anemia-- stable    Thrombocytopenia--platelets 117, will  follow-up with labs in 2 to 3 days    Hypertension-- continue current treatment    Obesity with BMI of 37.39 kg per metered square    Hyperlipidemia-- continue statin therapy      VTE Prophylaxis:   Mechanical Order History:      Ordered        06/02/22 1707  Maintain Sequential Compression Device  Continuous                    Pharmalogical Order History:      Ordered     Dose Route Frequency Stop    06/02/22 1707  Enoxaparin Sodium (LOVENOX) syringe 40 mg         40 mg SC Nightly --                Scribed for Marilynn Giordano MD by ROSALINA Friend. 6/7/2022  11:02 EDT       ROSALINA Friend  06/07/22  10:57 EDT     This was an audio and video enabled telemedicine encounter. I was unable to see the patient face to face as I was in COVID quarantine. The entire visit was performed on audio and video while the APRN performed the physical exam.      Marilynn Giordano MD  06/08/22  07:47 EDT

## 2022-06-07 NOTE — PROGRESS NOTES
Rehabilitation Nursing  Inpatient Rehabilitation Plan of Care Note    Plan of Care  Copy from POC  Pain    Pain Management (Active)  Current Status (6/2/2022 4:40:00 PM): potential for increased pain  Weekly Goal: pain at a tolerable level  Discharge Goal: no pain    Body Function Structure    Skin Integrity (Active)  Current Status (6/2/2022 4:40:00 PM): impaired skin integrity  Weekly Goal: improved skin integrity  Discharge Goal: wound healing    Safety    Potential for Injury (Active)  Current Status (6/2/2022 4:40:00 PM): potential for falls  Weekly Goal: no falls  Discharge Goal: no falls    Signed by: Carlie Sales Nurse

## 2022-06-07 NOTE — SIGNIFICANT NOTE
06/07/22 2880   Plan   Plan Team conference held today.  Spoke to pt and sister Ros about discharge on 6-16-22 and how he is doing in therapy.  Pt says he received call from Social Security office for disability application.  Pt will go home with sister at discharge who will assist with wound care and I.V. antibiotics.  Pt is receiving I.V. Vancomycin 1750 mg every 12 hours until 6-23-22.  Pt does not have preference for home health or home infusion pharmacy.  [facility] is preferred for DME. Sister knows how to reapply for pt to receive Medicaid.  Will follow.   Patient/Family in Agreement with Plan yes

## 2022-06-07 NOTE — PAYOR COMM NOTE
"Jaja Ramirez, RN   Utilization Review Nurse for Inpatient Rehab   Phone: [phone]  Fax: [phone]  Email: [email or web address]  [facility] NPI: [id number]    CLINICAL REVIEW FOR CONTINUED REHAB STAY APPROVAL  Member:  Melchor Hardwick  : 65  ID# [id number]  Ref#  [id number]  Admission Date:  22  Planning for Discharge home on 22.  Will have assistance from sister.  *Requesting additional 8 days    Melchor Hardwick (57 y.o. Male)             Date of Birth   1965    Social Security Number       Address   [address] [place] [address]    Home Phone   [phone]    MRN   [id number]       Buddhist   [facility]    Marital Status   Single                            Admission Date   22    Admission Type   Elective    Admitting Provider   Joe Mike MD    Attending Provider   Marilynn Giordano MD    Department, Room/Bed   [facility] REHABILITATION, 109/2S       Discharge Date       Discharge Disposition       Discharge Destination                               Attending Provider: Marilynn Giordano MD    Allergies: No Known Allergies    Isolation: None   Infection: MRSA No Isolation this Admit (22)   Code Status: CPR   Advance Care Planning Activity    Ht: 177.8 cm (70\")   Wt: 118 kg (260 lb 9.6 oz)    Admission Cmt: None   Principal Problem: None              Corrina Mendez MSW       Case Management   Significant Note      Signed   Date of Service:  22   Creation Time:  22 161              Signed                 22 1430   Plan   Plan Spoke to pt about contacting [employer] office to set-up an appointment for telephone disability application and he is agreeable.  Contacted [place] [employer] office [phone] judy Levine and gave phone to pt to set-up an appointment.  Pt says they will call him today for telephone application.  Pt plans to go home with sister Ros White who lives " at [address] [place] [address] in [facility] at discharge.  Informed pt about team conference meeting on 22.  Will follow.   Patient/Family in Agreement with Plan yes                 Natalie Yoder APRN    Nurse Practitioner   Infectious Disease   Progress Notes      Incomplete   Date of Service:  22   Creation Time:  22              Incomplete        Expand AllCollapse All               [facility]  PROGRESS NOTE     Patient Identification:  Name:  Melchor Hardwick  Age:  57 y.o.  Sex:  male  :  1965  MRN:  [id number]  Visit Number:  [id number]  ROOM: [facility]      Primary Care Provider:  Missy Up APRN     Length of stay in inpatient status:  5     Subjective      Chief Compliant:  Critical illness myopathy     History of Presenting Illness: 57-year-old gentleman being treated for critical illness myopathy.  Patient is status post left midfoot amputation secondary to osteomyelitis with staphylococcal aureus bacteremia.  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       Patient is awake and alert, resting comfortably in wheelchair.  States he feels pretty good overall this morning.  Denies any complaints.  Vital signs stable on room air.  Nursing changed the left foot dressing and reported the wound is healing well.     Objective      Current Hospital Meds:aspirin, 81 mg, Oral, Daily  atorvastatin, 40 mg, Oral, Nightly  enoxaparin, 40 mg, Subcutaneous, Nightly  furosemide, 40 mg, Oral, BID  Insulin Aspart, 0-7 Units, Subcutaneous, TID AC  Insulin Aspart, 15 Units, Subcutaneous, TID AC  insulin detemir, 45 Units, Subcutaneous, Daily  metFORMIN, 500 mg, Oral, BID With Meals  multivitamin, 1 tablet, Oral, Daily  nadolol, 20 mg, Oral, Q24H  nystatin, , Topical, Q12H  pantoprazole, 40 mg, Oral, Q AM  senna-docusate sodium, 2 tablet, Oral, BID  sodium  chloride, 10 mL, Intravenous, Q12H  sodium chloride, 10 mL, Intravenous, Q12H  spironolactone, 50 mg, Oral, Daily  vancomycin, 1,750 mg, Intravenous, Q12H     ----------------------------------------------------------------------------------------------------------------------  Vital Signs:  Temp:  [97.9 °F (36.6 °C)-98.4 °F (36.9 °C)] 97.9 °F (36.6 °C)  Heart Rate:  [75] 75  Resp:  [18] 18  BP: (107-119)/(65-70) 119/70  SpO2:  [93 %-97 %] 93 %  on   ;   Device (Oxygen Therapy): room air  Body mass index is 37.39 kg/m².         Wt Readings from Last 3 Encounters:   06/04/22 118 kg (260 lb 9.6 oz)   06/02/22 117 kg (258 lb)               Intake & Output (last 3 days)        06/04 0701  06/05 0700 06/05 0701  06/06 0700 06/06 0701  06/07 0700 06/07 0701  06/08 0700     P.O. 1800 1800 1440 480     I.V. (mL/kg)             IV Piggyback 1000 500 500 500     Total Intake(mL/kg) 2800 (23.7) 2300 (19.5) 1940 (16.4) 980 (8.3)     Urine (mL/kg/hr) 3950 (1.4) 4400 (1.6) 5100 (1.8)       Stool 2 0         Total Output 3952 4400 5100       Net -8072 -7818 -7600 +980                   Stool Unmeasured Occurrence   2 x              Diet Regular; Cardiac, Consistent Carbohydrate, Daily Fluid Restriction; 1500 mL Fluid Per Day; High Protein  ----------------------------------------------------------------------------------------------------------------------  Physical exam:  Constitutional:   Sitting up comfortably in wheelchair, has been participating with therapy without difficulty.  No acute distress noted.  HEENT: Normocephalic atraumatic  Neck:    Supple  Cardiovascular: Regular rate and rhythm  Pulmonary/Chest: Clear to auscultation  Abdominal: Positive bowel sounds.  Abdomen rounded, soft.  Musculoskeletal: Status post left forefoot amputation--wound appears to be healing well with no surrounding erythema or drainage  Neurological: Sensory change stocking glove distribution  Skin: No rash  Peripheral vascular: 2+ dependent  edema in bilateral lower extremities but improved.  Genitourinary: Scrotal edema improved  ----------------------------------------------------------------------------------------------------------------------     Last echocardiogram:  Results for orders placed during the hospital encounter of 05/10/22     Adult Transthoracic Echo Complete W/ Cont if Necessary Per Protocol     Interpretation Summary  · Normal left ventricular cavity size and wall thickness noted. All left ventricular wall segments contract normally.  · Left ventricular ejection fraction appears to be 61 - 65%.  · The mitral valve is structurally normal with no significant stenosis present. Trace mitral valve regurgitation is present.  · The aortic valve is structurally normal with no regurgitation or stenosis present.  · There is no evidence of pericardial effusion.     ----------------------------------------------------------------------------------------------------------------------        Results from last 7 days   Lab Units 06/06/22  0040 06/04/22  0030   WBC 10*3/mm3 4.84 5.58   HEMOGLOBIN g/dL 9.4* 10.1*   HEMATOCRIT % 29.7* 31.8*   MCV fL 79.4 79.1   MCHC g/dL 31.6 31.8   PLATELETS 10*3/mm3 117* 140                    Results from last 7 days   Lab Units 06/06/22  1710 06/06/22  0040 06/04/22  1829 06/04/22  0030 06/01/22  0452 05/31/22  1140   SODIUM mmol/L  --  137  --  134* 134* 133*   POTASSIUM mmol/L 4.0 3.6 3.8 3.3* 3.7 3.9   MAGNESIUM mg/dL  --   --   --   --   --  1.9   CHLORIDE mmol/L  --  105  --  104 103 103   CO2 mmol/L  --  22.6  --  23.1 24.3 23.7   BUN mg/dL  --  18  --  21* 19 20   CREATININE mg/dL  --  0.73*  --  0.73* 0.70* 0.78   CALCIUM mg/dL  --  7.9*  --  8.1* 7.8* 7.7*   GLUCOSE mg/dL  --  84  --  86 174* 232*   ALBUMIN g/dL  --   --   --  1.89*  --   --    BILIRUBIN mg/dL  --   --   --  0.4  --   --    ALK PHOS U/L  --   --   --  293*  --   --    AST (SGOT) U/L  --   --   --  47*  --   --    ALT (SGPT) U/L  --   --    --  37  --   --    Estimated Creatinine Clearance: 143.7 mL/min (A) (by C-G formula based on SCr of 0.73 mg/dL (L)).  No results found for: AMMONIA                      Glucose   Date/Time Value Ref Range Status   06/07/2022 0907 250 (H) 70 - 130 mg/dL Final       Comment:       Meter: [id number] : [id number] Steve Naye   06/07/2022 0604 112 70 - 130 mg/dL Final       Comment:       Meter: [id number] : [id number] Tracey Crystal   06/06/2022 1636 127 70 - 130 mg/dL Final       Comment:       Meter: [id number] : [id number] lisa ashley   06/06/2022 1120 91 70 - 130 mg/dL Final       Comment:       Meter: [id number] : [id number] crystal lino   06/06/2022 0602 139 (H) 70 - 130 mg/dL Final       Comment:       Meter: [id number] : [id number] Rainbow Crystal   06/05/2022 1614 184 (H) 70 - 130 mg/dL Final       Comment:       Meter: [id number] : [id number] Wender Juanita   06/05/2022 1109 130 70 - 130 mg/dL Final       Comment:       Meter: [id number] : [id number] Wender Juanita   06/05/2022 0858 170 (H) 70 - 130 mg/dL Final       Comment:       Meter: [id number] : [id number] Steve Yancey            Lab Results   Component Value Date     TSH 4.700 (H) 05/30/2022     FREET4 0.87 (L) 05/30/2022      No results found for: PREGTESTUR, PREGSERUM, HCG, HCGQUANT          Pain Management Panel         Pain Management Panel Latest Ref Rng & Units 5/24/2022 5/11/2022     CREATININE UR mg/dL 91.0 -     AMPHETAMINES SCREEN, URINE Negative - Negative     BARBITURATES SCREEN Negative - Negative     BENZODIAZEPINE SCREEN, URINE Negative - Negative     BUPRENORPHINEUR Negative - Negative     COCAINE SCREEN, URINE Negative - Negative     METHADONE SCREEN, URINE Negative - Negative     METHAMPHETAMINEUR Negative - Negative                                   Brief Urine Lab Results  (Last result in the past 365 days)       Color   Clarity   Blood   Leuk Est   Nitrite   Protein   CREAT   Urine HCG          05/24/22 1839                         91.0                   No results found for: BLOODCX      No results found for: URINECX  No results found for: WOUNDCX  No results found for: STOOLCX         I have personally looked at the labs and they are summarized above.  ----------------------------------------------------------------------------------------------------------------------  Detailed radiology reports for the last 24 hours:         Imaging Results (Last 24 Hours)      ** No results found for the last 24 hours. **          Final impressions for the last 30 days of radiology reports:     CT Abdomen Pelvis Without Contrast     Result Date: 5/25/2022  1.  Advanced liver cirrhosis with changes of portal hypertension which include spinal megaly, prominent portosystemic collateral vessels, small-moderate volume ascites, and marked anasarca. 2.  Moderate constipation and mild generalized ileus. No bowel obstruction. 3.  Miniscule pleural effusions. 4.  Other incidental and nonacute findings detailed above.  This report was finalized on 5/25/2022 8:48 AM by Dr. Brain Rodriguez MD.       XR Foot 3+ View Left     Result Date: 5/10/2022    Degenerative change of the midfoot.  This report was finalized on 5/10/2022 4:17 PM by Dr. Oswaldo Brown MD.       US Abdomen Complete     Result Date: 5/20/2022  1. Splenomegaly. 2. Small volume ascites.  This report was finalized on 5/20/2022 12:59 PM by Dr. Lobo Ring MD.       US Scrotum & Testicles     Result Date: 5/20/2022  Extensive scrotal wall swelling.   This report was finalized on 5/20/2022 12:59 PM by Dr. Lobo Ring MD.       US Scrotum & Testicles     Result Date: 5/16/2022  Scrotal wall thickening  This report was finalized on 5/16/2022 4:57 PM by Dr. Lobo Ring MD.       US Arterial Doppler Lower Extremity Left     Result Date: 5/10/2022  Abnormal examination demonstrating elevated flow velocities throughout the left lower extremity arterial system and biphasic  flow within the left posterior tibial artery. Contemporaneously performed CT demonstrates imaging findings highly suspicious for gas gangrene and infection/osteomyelitis. CT dictated separately. Signer Name: Oscar Dixon MD  Signed: 5/10/2022 7:16 PM  Workstation Name: [email or web address]  [facility]     MRI Foot Left Without Contrast     Result Date: 5/16/2022  Appearance concerning for osteomyelitis involving the 3rd metatarsal and likely the adjacent cuneiform.  This report was finalized on 5/16/2022 3:10 PM by Dr. Lobo Ring MD.       US Venous Doppler Lower Extremity Bilateral (duplex)     Result Date: 5/24/2022  No sonographic findings of DVT in the lower extremities.  This report was finalized on 5/24/2022 8:05 AM by Dr. Garry Lovelace II, MD.       CT Lower Extremity Left With Contrast     Result Date: 5/10/2022  Marked soft tissue edema and fat stranding at the level of the foot, diffuse with scattered foci of air both within soft tissues and bony structures. Findings are concerning for gas gangrene and osteomyelitis. Both the midfoot and forefoot are involved. Signer Name: ANTELMO COFFMAN MD  Signed: 5/10/2022 6:14 PM  Workstation Name: [email or web address]  [facility]     XR Foreign Body For MRI     Result Date: 5/16/2022  No radiopaque foreign body identified  This report was finalized on 5/16/2022 10:20 AM by Dr. Lobo Ring MD.       I have personally looked at the radiology images and read the final radiology report.     Assessment & Plan    Critical illness myopathy--participated with physical and Occupational Therapy on 6/6/2022: Performed bed mobility with contact-guard assist and verbal/nonverbal cues; performed transfer activities with contact-guard assist and verbal/nonverbal cues; utilizes wheelchair/front wheeled walker for chair to bed, sit/stand/stand sit transfer; required set up assist for upper body dressing; set up assist for grooming activity;  participated with gross motor coordination activities; therapeutic exercise/ROM     Status post left foot forefoot amputation-- Dr. Colby is unavailable until next Monday.  Continue IV antibiotic therapy     Staphylococcal aureus bacteremia--continue vancomycin through      Generalized edema with scrotal edema-- continue Lasix 40 mg twice daily.  Scrotal edema improved     Cirrhosis secondary to [place]-- nadolol.  Likely benefit from outpatient GI evaluation May need screening for esophageal varices     Diabetes mellitus-- controlled continue Levemir and mealtime coverage     Anemia-- stable     Thrombocytopenia--platelets 117, will follow-up with labs in 2 to 3 days     Hypertension-- continue current treatment     Obesity with BMI of 37.39 kg per metered square     Hyperlipidemia-- continue statin therapy        VTE Prophylaxis:       Mechanical Order History:               Ordered          22   Maintain Sequential Compression Device  Continuous                               Pharmalogical Order History:                   Ordered     Dose Route Frequency Stop      22   Enoxaparin Sodium (LOVENOX) syringe 40 mg         40 mg SC Nightly --                     Scribed for Marilynn Giordano MD by ROSALINA Friend. 2022  11:02 EDT         ROSALINA Friend  22  10:57 EDT                Michi Mckeon PTA    Physical Therapist Assistant   Physical Therapy   Therapy Treatment Note      Addendum   Date of Service:  22   Creation Time:  22              Expand All Collapse All        Inpatient Rehabilitation - Physical Therapy Treatment Note                                                              YVONNE Gaines     Patient Name: Melchor Hardwick             : 1965                      MRN: [id number]     Today's Date: 2022                                                                                              Admit Date: 2022                  Visit Dx:   Visit Diagnosis   No diagnosis found.        Problem List       Patient Active Problem List   Diagnosis   • Hyperlipidemia   • Hypertensive disorder   • Kidney disease   • Obesity   • Type 2 diabetes mellitus (HCC)   • Gas gangrene of foot (HCC)   • Cellulitis in diabetic foot (HCC)   • Other acute osteomyelitis of left foot (HCC)   • Osteomyelitis (HCC)   • Critical illness myopathy            Medical History        Past Medical History:   Diagnosis Date   • Diabetes mellitus (HCC)     • Elevated cholesterol     • Hyperlipidemia     • Hypertension              Surgical History   Past Surgical History:   Procedure Laterality Date   • ABSCESS DRAINAGE         PERINEUM   • AMPUTATION FOOT Left 5/18/2022     Procedure: AMPUTATION FOOT;  Surgeon: Addison Colby MD;  Location: [facility];  Service: Podiatry;  Laterality: Left;   • INCISION AND DRAINAGE LEG Left 05/10/2022     Procedure: INCISION AND DRAINAGE LOWER EXTREMITY;  Surgeon: Addison Colby MD;  Location: [facility];  Service: Podiatry;  Laterality: Left;   • WOUND DEBRIDEMENT Left 05/13/2022     Procedure: DEBRIDEMENT FOOT;  Surgeon: Addison Colby MD;  Location: [facility];  Service: Podiatry;  Laterality: Left;            PT ASSESSMENT (last 12 hours)                IRF PT Evaluation and Treatment             Row Name 06/07/22 1112                   PT Time and Intention      Document Type daily treatment  am session  -RG        Mode of Treatment individual therapy;physical therapy  -RG        Patient/Family/Caregiver Comments/Observations Pt and nursing in agreement for skilled PT on this date.  -RG               Row Name 06/07/22 1112                   General Information      Patient Profile Reviewed yes  -RG        Existing Precautions/Restrictions fall;non-weight bearing  hernandez, <skin integrity L foot  -RG        Row Name 06/07/22 1112                   Pain Scale: FACES Pre/Post-Treatment      Pain: FACES Scale, Pretreatment 6-->hurts  even more  -RG        Posttreatment Pain Rating 6-->hurts even more  -               Row Name 06/07/22 1112                   Cognition/Psychosocial      Affect/Mental Status (Cognition) WFL  -RG        Follows Commands (Cognition) WFL  -[facility] Name 06/07/22 1112                   Mobility      Left Lower Extremity (Weight-bearing Status) non weight-bearing (NWB)  -RG        Right Lower Extremity (Weight-bearing Status) weight-bearing as tolerated (WBAT)  -[facility] Name 06/07/22 1112                   Hip (Therapeutic Exercise)      Hip Strengthening (Therapeutic Exercise) bilateral;flexion;aBduction;aDduction;marching while seated;sitting;resistance band;10 repetitions;2 sets;2 lb free weight;internal rotation;external rotation;heel slides;marching while standing;standing;supine;green  -[facility] Name 06/07/22 1112                   Knee (Therapeutic Exercise)      Knee Strengthening (Therapeutic Exercise) flexion;bilateral;extension;marching while seated;LAQ (long arc quad);sitting;2 lb free weight;green;10 repetitions;2 sets;heel slides;marching while standing;SAQ (short arc quad);standing;supine;resistance band  -[facility] Name 06/07/22 1112                   Ankle (Therapeutic Exercise)      Ankle Strengthening (Therapeutic Exercise) dorsiflexion;plantarflexion;sitting;10 repetitions;2 sets;right;supine  -[facility] Name 06/07/22 1112                   Positioning and Restraints      Pre-Treatment Position sitting in chair/recliner  -RG        Post Treatment Position bed  -RG        In Bed notified nsg;supine;call light within reach;encouraged to call for assist;legs elevated  -[facility] Name 06/07/22 1112                   Therapy Assessment/Plan (PT)      Patient's Goals For Discharge return home  -[facility] Name 06/07/22 1112                   Therapy Assessment/Plan (PT)      Rehab Potential/Prognosis (PT) adequate, monitor  progress closely  -RG        Frequency of Treatment (PT) 5 times per week  -RG        Estimated Duration of Therapy (PT) 1 week;2 weeks  -RG        Problem List (PT) balance;mobility;range of motion (ROM);strength;pain  -RG        Activity Limitations Related to Problem List (PT) unable to ambulate safely;unable to transfer safely  -RG               Row Name 06/07/22 1112                   Therapy Plan Review/Discharge Plan (PT)      Anticipated Equipment Needs at Discharge (PT Eval) --  tbd  -RG        Anticipated Discharge Disposition (PT) home with home health  -RG               Row Name 06/07/22 1112                   IRF PT Goals      Bed Mobility Goal Selection (PT-IRF) bed mobility, PT goal 1  -RG        Transfer Goal Selection (PT-IRF) transfers, PT goal 1  -RG        Gait (Walking Locomotion) Goal Selection (PT-IRF) gait, PT goal 1  -RG               Row Name 06/07/22 1112                   Bed Mobility Goal 1 (PT-IRF)      Activity/Assistive Device (Bed Mobility Goal 1, PT-IRF) sit to supine/supine to sit  -RG        [place] Level (Bed Mobility Goal 1, PT-IRF) modified independence  -RG        Time Frame (Bed Mobility Goal 1, PT-IRF) by discharge  -RG               Row Name 06/07/22 1112                   Transfer Goal 1 (PT-IRF)      Activity/Assistive Device (Transfer Goal 1, PT-IRF) sit-to-stand/stand-to-sit;bed-to-chair/chair-to-bed  -RG        [place] Level (Transfer Goal 1, PT-IRF) supervision required  -RG        Time Frame (Transfer Goal 1, PT-IRF) by discharge  -RG               Row Name 06/07/22 1112                   Gait/Walking Locomotion Goal 1 (PT-IRF)      Activity/Assistive Device (Gait/Walking Locomotion Goal 1, PT-IRF) walker, rolling  -RG        Gait/Walking Locomotion Distance Goal 1 (PT-IRF) 2'  -RG        [place] Level (Gait/Walking Locomotion Goal 1, PT-IRF) supervision required  -RG        Time Frame (Gait/Walking Locomotion Goal 1, PT-IRF) by discharge  -RG                         User Key  (r) = Recorded By, (t) = Taken By, (c) = Cosigned By             Initials Name Provider Type      RG Michi Mckeon PTA Physical Therapist Assistant                          Wound 05/16/22 1340 coccyx Pressure Injury (Active)   Dressing Appearance open to air 06/07/22 1010   Closure None 06/06/22 1911   Base red;blanchable 06/07/22 1010   Periwound blanchable;redness 06/07/22 1010   Periwound Temperature warm 06/07/22 1010   Drainage Amount none 06/07/22 1010   Care, Wound barrier applied 06/07/22 1010   Dressing Care open to air 06/07/22 1010   Periwound Care barrier ointment applied 06/06/22 1911       Wound 05/18/22 1545 Left other (see comments) foot Incision (Active)   Dressing Appearance intact;dry 06/07/22 1010   Closure [place];Sutures 06/06/22 1911   Base dressing in place, unable to visualize 06/07/22 1010   Periwound intact 06/06/22 1911   Periwound Temperature warm 06/06/22 1911   Periwound Skin Turgor firm 06/06/22 1808   Drainage Amount none 06/06/22 1911   Care, Wound cleansed with 06/06/22 1911   Dressing Care dressing changed 06/06/22 1911       Wound 06/02/22 1240 scrotum MASD (Moisture associated skin damage) (Active)   Dressing Appearance open to air 06/07/22 1010   Closure Open to air 06/06/22 1911   Base red;moist 06/07/22 1010   Care, Wound other (see comments) 06/06/22 1911           Physical Therapy Education                              Title: PT OT SLP Therapies (Done)                Topic: Physical Therapy (Done)                Point: Mobility training (Done)                Learning Progress Summary                 Patient Acceptance, E,D, VU,NR by RG at 6/7/2022 1117     Acceptance, E, VU,NR by LB at 6/6/2022 1500     Acceptance, E,D, VU,NR by RG at 6/6/2022 1430     Eager, E,TB, DU,VU by HR at 6/4/2022 1157     Acceptance, E, VU,NR by LB at 6/3/2022 1430                                   Point: Home exercise program (Done)                Learning Progress  Summary                 Patient Acceptance, E,D, VU,NR by RG at 6/7/2022 1117     Acceptance, E, VU,NR by LB at 6/6/2022 1500     Acceptance, E,D, VU,NR by RG at 6/6/2022 1430     Eager, E,TB, DU,VU by HR at 6/4/2022 1157     Acceptance, E, VU,NR by LB at 6/3/2022 1430                                   Point: Body mechanics (Done)                Learning Progress Summary                 Patient Acceptance, E,D, VU,NR by RG at 6/7/2022 1117     Acceptance, E, VU,NR by LB at 6/6/2022 1500     Acceptance, E,D, VU,NR by RG at 6/6/2022 1430     Eager, E,TB, DU,VU by HR at 6/4/2022 1157     Acceptance, E, VU,NR by LB at 6/3/2022 1430                                   Point: Precautions (Done)                Learning Progress Summary                 Patient Acceptance, E,D, VU,NR by RG at 6/7/2022 1117     Acceptance, E, VU,NR by LB at 6/6/2022 1500     Acceptance, E,D, VU,NR by RG at 6/6/2022 1430     Eager, E,TB, DU,VU by HR at 6/4/2022 1157     Acceptance, E, VU,NR by LB at 6/3/2022 1430                                                 User Key               Initials Effective Dates Name Provider Type Discipline       06/16/21 -  Willow Villalta, PT Physical Therapist PT       06/16/21 -  Michi Mckeon PTA Physical Therapist Assistant PT      HR 01/14/22 -  Hanna Murphy PTA Physical Therapist Assistant PT                        PT Recommendation and Plan     Frequency of Treatment (PT): 5 times per week  Anticipated Equipment Needs at Discharge (PT Eval):  (tbd)                           Time Calculation:                PT Charges              Row Name 06/07/22 1429 06/07/22 1117                    Time Calculation      Start Time 1245  -RG 1000  -RG        Stop Time 1330  -RG 1045  -RG        Time Calculation (min) 45 min  -RG 45 min  -RG        PT Received On 06/07/22  -RG 06/07/22  -RG                           Time Calculation- PT      Total Timed Code Minutes- PT 45 minute(s)  -RG 45 minute(s)  -RG                 Mandi Smith, PAYAM    Occupational Therapist   Occupational Therapy   Therapy Treatment Note      Signed   Date of Service:  22   Creation Time:  22              Signed        Expand All Collapse All        Inpatient Rehabilitation - Occupational Therapy Treatment Note     YVONNE Gaines     Patient Name: Melchor Hardwick             : 1965                      MRN: [id number]     Today's Date: 2022                                                                               Admit Date: 2022        Visit Diagnosis   No diagnosis found.        Problem List       Patient Active Problem List   Diagnosis   • Hyperlipidemia   • Hypertensive disorder   • Kidney disease   • Obesity   • Type 2 diabetes mellitus (HCC)   • Gas gangrene of foot (HCC)   • Cellulitis in diabetic foot (HCC)   • Other acute osteomyelitis of left foot (HCC)   • Osteomyelitis (HCC)   • Critical illness myopathy            Medical History        Past Medical History:   Diagnosis Date   • Diabetes mellitus (HCC)     • Elevated cholesterol     • Hyperlipidemia     • Hypertension              Surgical History         Past Surgical History:   Procedure Laterality Date   • ABSCESS DRAINAGE         PERINEUM   • AMPUTATION FOOT Left 2022     Procedure: AMPUTATION FOOT;  Surgeon: Addison Colby MD;  Location: [facility];  Service: Podiatry;  Laterality: Left;   • INCISION AND DRAINAGE LEG Left 05/10/2022     Procedure: INCISION AND DRAINAGE LOWER EXTREMITY;  Surgeon: Addison Colby MD;  Location: [facility];  Service: Podiatry;  Laterality: Left;   • WOUND DEBRIDEMENT Left 2022     Procedure: DEBRIDEMENT FOOT;  Surgeon: Addison Colby MD;  Location: [facility];  Service: Podiatry;  Laterality: Left;                               IRF OT ASSESSMENT FLOWSHEET (last 12 hours)                IRF OT Evaluation and Treatment             Row Name 22 1418                   OT Time and Intention       Document Type daily treatment  -BF        Mode of Treatment occupational therapy  -BF        Patient Effort good  -BF        Symptoms Noted During/After Treatment none  -BF               Row Name 06/06/22 1418                   General Information      Existing Precautions/Restrictions fall;non-weight bearing;left  hernandez, BRETT LLE  -BF               Row Name 06/06/22 1418                   Cognition/Psychosocial      Orientation Status (Cognition) oriented x 3  -BF        Follows Commands (Cognition) WFL  -BF               Row Name 06/06/22 1418                   Upper Body Dressing      [place] Level (Upper Body Dressing) set up assistance  -BF        Position (Upper Body Dressing) supported sitting  -BF               Row Name 06/06/22 1418                   Grooming      [place] Level (Grooming) set up  -BF        Position (Grooming) supported sitting  -BF               Row Name 06/06/22 1418                   Motor Skills      Motor Control/Coordination Interventions gross motor coordination activities;therapeutic exercise/ROM  BUE GMC therex, strengthening; BUE rickshaw 30 lbs X20X4; BUE PRE 20 mins; flexbar therex, 3 lb dowel therex  -BF               Row Name 06/06/22 1418                   Positioning and Restraints      In Wheelchair sitting;with PT  after both sessions  -BF                        User Key  (r) = Recorded By, (t) = Taken By, (c) = Cosigned By             Initials Name Effective Dates      BF Mandi Smith OT 06/16/21 -                         Occupational Therapy Education                              Title: PT OT SLP Therapies (Done)                Topic: Occupational Therapy (Done)                Point: ADL training (Done)           Description:   Instruct learner(s) on proper safety adaptation and remediation techniques during self care or transfers.   Instruct in proper use of assistive devices.                                  Learning Progress Summary                  Patient Acceptance, E, VU,NR by BF at 2022 1418     Acceptance, E, VU,NR by BF at 6/3/2022 1347                                   Point: Precautions (Done)           Description:   Instruct learner(s) on prescribed precautions during self-care and functional transfers.                                  Learning Progress Summary                 Patient Acceptance, E, VU,NR by BF at 2022 1418     Acceptance, E, VU,NR by BF at 6/3/2022 1347                                                 User Key               Initials Effective Dates Name Provider Type Discipline       21 -  Mandi Smith OT Occupational Therapist OT                              OT Recommendation and Plan     Planned Therapy Interventions (OT): activity tolerance training, adaptive equipment training, BADL retraining, ROM/therapeutic exercise, strengthening exercise, transfer/mobility retraining                          Time Calculation:                Time Calculation- OT              Row Name 22 1427 22 0930                    Time Calculation- OT      OT Start Time 1230  -BF 0930  -BF        OT Stop Time 1330  -BF 1000  -BF        OT Time Calculation (min) 60 min  -BF 30 min  -BF        Total Timed Code Minutes- OT 60 minute(s)  -BF 30 minute(s)  -BF        OT Non-Billable Time (min) -- 10 min  -BF                           Mandi Smith, PAYAM    Occupational Therapist   Occupational Therapy   Therapy Treatment Note      Signed   Date of Service:  22 123   Creation Time:  22              Signed        Expand All Collapse All        Inpatient Rehabilitation - Occupational Therapy Treatment Note     YVONNE Gaines     Patient Name: Melchor Hardwick             : 1965                      MRN: [id number]     Today's Date: 2022                                                                               Admit Date: 2022        Visit Diagnosis   No diagnosis found.        Problem  List       Patient Active Problem List   Diagnosis   • Hyperlipidemia   • Hypertensive disorder   • Kidney disease   • Obesity   • Type 2 diabetes mellitus (HCC)   • Gas gangrene of foot (HCC)   • Cellulitis in diabetic foot (HCC)   • Other acute osteomyelitis of left foot (HCC)   • Osteomyelitis (HCC)   • Critical illness myopathy            Medical History        Past Medical History:   Diagnosis Date   • Diabetes mellitus (HCC)     • Elevated cholesterol     • Hyperlipidemia     • Hypertension              Surgical History         Past Surgical History:   Procedure Laterality Date   • ABSCESS DRAINAGE         PERINEUM   • AMPUTATION FOOT Left 5/18/2022     Procedure: AMPUTATION FOOT;  Surgeon: Addison Colby MD;  Location: [facility];  Service: Podiatry;  Laterality: Left;   • INCISION AND DRAINAGE LEG Left 05/10/2022     Procedure: INCISION AND DRAINAGE LOWER EXTREMITY;  Surgeon: Addison Colby MD;  Location: [facility];  Service: Podiatry;  Laterality: Left;   • WOUND DEBRIDEMENT Left 05/13/2022     Procedure: DEBRIDEMENT FOOT;  Surgeon: Addison Colby MD;  Location: [facility];  Service: Podiatry;  Laterality: Left;                               IRF OT ASSESSMENT FLOWSHEET (last 12 hours)                IRF OT Evaluation and Treatment             Row Name 06/07/22 1232                   OT Time and Intention      Document Type daily treatment  -BF        Mode of Treatment occupational therapy  -BF        Patient Effort good  -BF        Symptoms Noted During/After Treatment none  -BF               Row Name 06/07/22 1232                   General Information      Existing Precautions/Restrictions fall;non-weight bearing;other (see comments)  david ramey, BRETT LLE, < skin integrity  -BF        Limitations/Impairments safety/cognitive  -BF               Row Name 06/07/22 1232                   Cognition/Psychosocial      Orientation Status (Cognition) oriented x 3  -BF        Follows Commands (Cognition) WFL  -BF                Row Name 06/07/22 1232                   Bathing      [place] Level (Bathing) contact guard assist;verbal cues  -BF        Position (Bathing) supported sitting  -BF        Comment (Bathing) CGA  -BF               Row Name 06/07/22 1232                   Upper Body Dressing      [place] Level (Upper Body Dressing) set up assistance  -BF        Position (Upper Body Dressing) supported sitting  -BF        Comment (Upper Body Dressing) Set-up  -BF               Row Name 06/07/22 1232                   Lower Body Dressing      [place] Level (Lower Body Dressing) contact guard assist;verbal cues  -BF        Position (Lower Body Dressing) supported sitting  -BF        Comment (Lower Body Dressing) CGA  -BF               Row Name 06/07/22 1232                   Grooming      [place] Level (Grooming) set up  -BF        Position (Grooming) supported sitting  -BF        Comment (Grooming) Set-up  -BF               Row Name 06/07/22 1232                   Motor Skills      Motor Control/Coordination Interventions gross motor coordination activities;therapeutic exercise/ROM  BUE GMC therex, strengthening; BUE PRE 20 mins; BUE rickshaw 30 lbs X20X5  -BF                        User Key  (r) = Recorded By, (t) = Taken By, (c) = Cosigned By             Initials Name Effective Dates      BF Mandi Smith OT 06/16/21 -                              Occupational Therapy Education                              Title: PT OT SLP Therapies (Done)                Topic: Occupational Therapy (Done)                Point: ADL training (Done)           Description:   Instruct learner(s) on proper safety adaptation and remediation techniques during self care or transfers.   Instruct in proper use of assistive devices.                                  Learning Progress Summary                 Patient Acceptance, E, VU,NR by BF at 6/7/2022 1232     Acceptance, E, VU,NR by BF at 6/6/2022 1418     Acceptance,  E, VU,NR by BF at 6/3/2022 1347                                   Point: Precautions (Done)           Description:   Instruct learner(s) on prescribed precautions during self-care and functional transfers.                                  Learning Progress Summary                 Patient Acceptance, E, VU,NR by BF at 6/7/2022 1232     Acceptance, E, VU,NR by BF at 6/6/2022 1418     Acceptance, E, VU,NR by BF at 6/3/2022 1347                                                 User Key               Initials Effective Dates Name Provider Type Discipline       06/16/21 -  Mandi Smith OT Occupational Therapist OT                              OT Recommendation and Plan     Planned Therapy Interventions (OT): activity tolerance training, adaptive equipment training, BADL retraining, ROM/therapeutic exercise, strengthening exercise, transfer/mobility retraining                          Time Calculation:                Time Calculation- OT              Row Name 06/07/22 1236                         Time Calculation- OT      OT Start Time 0805  -BF          OT Stop Time 0935  -BF          OT Time Calculation (min) 90 min  -BF          Total Timed Code Minutes- OT 90 minute(s)  -BF          OT Non-Billable Time (min) 10 min  -BF

## 2022-06-07 NOTE — PROGRESS NOTES
[facility] Coordinator  Inpatient Rehabilitation Admission    Ethnic Group:  Marital Status:    IRF Admission Date:  06/02/2022  Admission Class:  Admit From:    Pre-Hospital Living:    Payment Sources:  Impairment Group:  Date of Onset of Impairment:    Etiologic Diagnosis Code(s):  Rank Code      Description  1    A41.02    Sepsis due to Methicillin resistant                 Staphylococcus aureus    Comorbidities:  Rank Code      Description    1    G72.81    Critical illness myopathy  2    M86.9     Osteomyelitis, unspecified  3    K76.6     Portal hypertension  4    R65.20    Severe sepsis without septic shock  5    E11.69    Type 2 diabetes mellitus with other specified                 complication  6    K74.60    Unspecified cirrhosis of liver  7    K75.81    Nonalcoholic steatohepatitis (PAZ)  8    E78.5     Hyperlipidemia, unspecified  9    I10       Essential (primary) hypertension  10   E11.40    Type 2 diabetes mellitus with diabetic                 neuropathy, unspecified  11   Z89.432   Acquired absence of left foot  12   Z68.37    Body mass index (BMI) 37.0-37.9, adult  13   E66.9     Obesity, unspecified  14   D50.9     Iron deficiency anemia, unspecified  15   N50.89    Other specified disorders of the male genital                 organs  16   R60.1     Generalized edema  17   Z79.4     Long term (current) use of insulin    Height on Admission:  Weight on Admission:    Are there any arthritis conditions recorded for Impairment Group, Etiologic  Diagnosis, or Comorbid Conditions that meet all of the regulatory requirements  for IRF classification (in 42 .29(b)(2)(x), (xi), and xii))?  No    SHANNON Bladder Accidents:  0 - Accidents.  Bladder Score = 6. Patient has not had an accident, but uses a  device/medication. hernandez  SHANNON Bowel Accident: 0 -Accidents.  Bowel Score = 6. Patient has no accidents, but uses a device/medications. bowel  med    Signed by: Nurse Junior

## 2022-06-07 NOTE — THERAPY TREATMENT NOTE
Inpatient Rehabilitation - Physical Therapy Treatment Note        [place]     Patient Name: Melchor Hardwick  : 1965  MRN: [id number]    Today's Date: 2022                    Admit Date: 2022      Visit Dx:   No diagnosis found.    Patient Active Problem List   Diagnosis   • Hyperlipidemia   • Hypertensive disorder   • Kidney disease   • Obesity   • Type 2 diabetes mellitus (HCC)   • Gas gangrene of foot (HCC)   • Cellulitis in diabetic foot (HCC)   • Other acute osteomyelitis of left foot (HCC)   • Osteomyelitis (HCC)   • Critical illness myopathy       Past Medical History:   Diagnosis Date   • Diabetes mellitus (HCC)    • Elevated cholesterol    • Hyperlipidemia    • Hypertension        Past Surgical History:   Procedure Laterality Date   • ABSCESS DRAINAGE      PERINEUM   • AMPUTATION FOOT Left 2022    Procedure: AMPUTATION FOOT;  Surgeon: Addison Colby MD;  Location: [facility];  Service: Podiatry;  Laterality: Left;   • INCISION AND DRAINAGE LEG Left 05/10/2022    Procedure: INCISION AND DRAINAGE LOWER EXTREMITY;  Surgeon: Addison Colby MD;  Location: [facility];  Service: Podiatry;  Laterality: Left;   • WOUND DEBRIDEMENT Left 2022    Procedure: DEBRIDEMENT FOOT;  Surgeon: Addison Colby MD;  Location: [facility];  Service: Podiatry;  Laterality: Left;       PT ASSESSMENT (last 12 hours)     IRF PT Evaluation and Treatment     Row Name 22 111          PT Time and Intention    Document Type daily treatment  am session  -RG     Mode of Treatment individual therapy;physical therapy  -RG     Patient/Family/Caregiver Comments/Observations Pt and nursing in agreement for skilled PT on this date.  -RG     Row Name 22 111          General Information    Patient Profile Reviewed yes  -RG     Existing Precautions/Restrictions fall;non-weight bearing  hernandez, <skin integrity L foot  -RG     Row Name 22 111          Pain Scale: FACES Pre/Post-Treatment    Pain: FACES  Scale, Pretreatment 6-->hurts even more  -RG     Posttreatment Pain Rating 6-->hurts even more  -RG     [facility] Name 06/07/22 1112          Cognition/Psychosocial    Affect/Mental Status (Cognition) WFL  -RG     Follows Commands (Cognition) WFL  -[facility] Name 06/07/22 1112          Mobility    Left Lower Extremity (Weight-bearing Status) non weight-bearing (NWB)  -RG     Right Lower Extremity (Weight-bearing Status) weight-bearing as tolerated (WBAT)  -[facility] Name 06/07/22 1112          Hip (Therapeutic Exercise)    Hip Strengthening (Therapeutic Exercise) bilateral;flexion;aBduction;aDduction;marching while seated;sitting;resistance band;10 repetitions;2 sets;2 lb free weight;internal rotation;external rotation;heel slides;marching while standing;standing;supine;green  -[facility] Name 06/07/22 1112          Knee (Therapeutic Exercise)    Knee Strengthening (Therapeutic Exercise) flexion;bilateral;extension;marching while seated;LAQ (long arc quad);sitting;2 lb free weight;green;10 repetitions;2 sets;heel slides;marching while standing;SAQ (short arc quad);standing;supine;resistance band  -[facility] Name 06/07/22 1112          Ankle (Therapeutic Exercise)    Ankle Strengthening (Therapeutic Exercise) dorsiflexion;plantarflexion;sitting;10 repetitions;2 sets;right;supine  -[facility] Name 06/07/22 1112          Positioning and Restraints    Pre-Treatment Position sitting in chair/recliner  -RG     Post Treatment Position bed  -RG     In Bed notified nsg;supine;call light within reach;encouraged to call for assist;legs elevated  -[facility] Name 06/07/22 1112          Therapy Assessment/Plan (PT)    Patient's Goals For Discharge return home  -[facility] Name 06/07/22 1112          Therapy Assessment/Plan (PT)    Rehab Potential/Prognosis (PT) adequate, monitor progress closely  -RG     Frequency of Treatment (PT) 5 times per week  -RG     Estimated Duration of Therapy (PT) 1 week;2 weeks  -RG     Problem List  (PT) balance;mobility;range of motion (ROM);strength;pain  -RG     Activity Limitations Related to Problem List (PT) unable to ambulate safely;unable to transfer safely  -RG     Row Name 06/07/22 1112          Therapy Plan Review/Discharge Plan (PT)    Anticipated Equipment Needs at Discharge (PT Eval) --  tbd  -RG     Anticipated Discharge Disposition (PT) home with home health  -RG     Row Name 06/07/22 1112          IRF PT Goals    Bed Mobility Goal Selection (PT-IRF) bed mobility, PT goal 1  -RG     Transfer Goal Selection (PT-IRF) transfers, PT goal 1  -RG     Gait (Walking Locomotion) Goal Selection (PT-IRF) gait, PT goal 1  -RG     Row Name 06/07/22 1112          Bed Mobility Goal 1 (PT-IRF)    Activity/Assistive Device (Bed Mobility Goal 1, PT-IRF) sit to supine/supine to sit  -RG     [place] Level (Bed Mobility Goal 1, PT-IRF) modified independence  -RG     Time Frame (Bed Mobility Goal 1, PT-IRF) by discharge  -RG     Row Name 06/07/22 1112          Transfer Goal 1 (PT-IRF)    Activity/Assistive Device (Transfer Goal 1, PT-IRF) sit-to-stand/stand-to-sit;bed-to-chair/chair-to-bed  -RG     [place] Level (Transfer Goal 1, PT-IRF) supervision required  -RG     Time Frame (Transfer Goal 1, PT-IRF) by discharge  -RG     Row Name 06/07/22 1112          Gait/Walking Locomotion Goal 1 (PT-IRF)    Activity/Assistive Device (Gait/Walking Locomotion Goal 1, PT-IRF) walker, rolling  -RG     Gait/Walking Locomotion Distance Goal 1 (PT-IRF) 2'  -RG     [place] Level (Gait/Walking Locomotion Goal 1, PT-IRF) supervision required  -RG     Time Frame (Gait/Walking Locomotion Goal 1, PT-IRF) by discharge  -RG           User Key  (r) = Recorded By, (t) = Taken By, (c) = Cosigned By    Initials Name Provider Type    RG Michi Mckeon PTA Physical Therapist Assistant              Wound 05/16/22 1340 coccyx Pressure Injury (Active)   Dressing Appearance open to air 06/07/22 1010   Closure None 06/06/22 1911   Base  red;blanchable 06/07/22 1010   Periwound blanchable;redness 06/07/22 1010   Periwound Temperature warm 06/07/22 1010   Drainage Amount none 06/07/22 1010   Care, Wound barrier applied 06/07/22 1010   Dressing Care open to air 06/07/22 1010   Periwound Care barrier ointment applied 06/06/22 1911       Wound 05/18/22 1545 Left other (see comments) foot Incision (Active)   Dressing Appearance intact;dry 06/07/22 1010   Closure [place];Sutures 06/06/22 1911   Base dressing in place, unable to visualize 06/07/22 1010   Periwound intact 06/06/22 1911   Periwound Temperature warm 06/06/22 1911   Periwound Skin Turgor firm 06/06/22 1808   Drainage Amount none 06/06/22 1911   Care, Wound cleansed with 06/06/22 1911   Dressing Care dressing changed 06/06/22 1911       Wound 06/02/22 1240 scrotum MASD (Moisture associated skin damage) (Active)   Dressing Appearance open to air 06/07/22 1010   Closure Open to air 06/06/22 1911   Base red;moist 06/07/22 1010   Care, Wound other (see comments) 06/06/22 1911     Physical Therapy Education                 Title: PT OT SLP Therapies (Done)     Topic: Physical Therapy (Done)     Point: Mobility training (Done)     Learning Progress Summary           Patient Acceptance, E,D, VU,NR by RG at 6/7/2022 1117    Acceptance, E, VU,NR by LB at 6/6/2022 1500    Acceptance, E,D, VU,NR by RG at 6/6/2022 1430    Eager, E,TB, DU,VU by HR at 6/4/2022 1157    Acceptance, E, VU,NR by LB at 6/3/2022 1430                   Point: Home exercise program (Done)     Learning Progress Summary           Patient Acceptance, E,D, VU,NR by RG at 6/7/2022 1117    Acceptance, E, VU,NR by LB at 6/6/2022 1500    Acceptance, E,D, VU,NR by RG at 6/6/2022 1430    Eager, E,TB, DU,VU by HR at 6/4/2022 1157    Acceptance, E, VU,NR by LB at 6/3/2022 1430                   Point: Body mechanics (Done)     Learning Progress Summary           Patient Acceptance, E,D, VU,NR by RG at 6/7/2022 1117    Acceptance, E, VU,NR by LB  at 6/6/2022 1500    Acceptance, E,D, VU,NR by RG at 6/6/2022 1430    Eager, E,TB, DU,VU by HR at 6/4/2022 1157    Acceptance, E, VU,NR by LB at 6/3/2022 1430                   Point: Precautions (Done)     Learning Progress Summary           Patient Acceptance, E,D, VU,NR by RG at 6/7/2022 1117    Acceptance, E, VU,NR by LB at 6/6/2022 1500    Acceptance, E,D, VU,NR by RG at 6/6/2022 1430    Eager, E,TB, DU,VU by HR at 6/4/2022 1157    Acceptance, E, VU,NR by LB at 6/3/2022 1430                               User Key     Initials Effective Dates Name Provider Type Discipline    LB 06/16/21 -  Willow Villalta, PT Physical Therapist PT    RG 06/16/21 -  Michi Mckeon PTA Physical Therapist Assistant PT    HR 01/14/22 -  Hanna Murphy PTA Physical Therapist Assistant PT                PT Recommendation and Plan    Frequency of Treatment (PT): 5 times per week  Anticipated Equipment Needs at Discharge (PT Eval):  (tbd)                  Time Calculation:      PT Charges     Row Name 06/07/22 1429 06/07/22 1117          Time Calculation    Start Time 1245  -RG 1000  -RG     Stop Time 1330  -RG 1045  -RG     Time Calculation (min) 45 min  -RG 45 min  -RG     PT Received On 06/07/22  -RG 06/07/22  -RG            Time Calculation- PT    Total Timed Code Minutes- PT 45 minute(s)  -RG 45 minute(s)  -RG           User Key  (r) = Recorded By, (t) = Taken By, (c) = Cosigned By    Initials Name Provider Type    RG Michi Mckeon PTA Physical Therapist Assistant                Therapy Charges for Today     Code Description Service Date Service Provider Modifiers Qty    66070735907 HC PT THERAPEUTIC ACT EA 15 MIN 6/6/2022 Michi Mckeon PTA GP, CQ 1    04488925826 HC PT THER PROC EA 15 MIN 6/6/2022 Michi Mckeon PTA GP, CQ 2    88148410359 HC PT THERAPEUTIC ACT EA 15 MIN 6/7/2022 Michi Mckeon PTA GP, CQ 1    20599633713 HC PT THER PROC EA 15 MIN 6/7/2022 Michi Mckeon, PTA GP, CQ 2    [id number]  PT THERAPEUTIC  ACT EA 15 MIN 6/7/2022 Michi Mckeon PTA GP, CQ 2    [id number]  PT THER PROC EA 15 MIN 6/7/2022 Michi Mckeon PTA GP, CQ 1                   Michi Mckeon, LIZETT  6/7/2022

## 2022-06-08 LAB
GLUCOSE BLDC GLUCOMTR-MCNC: 126 MG/DL (ref 70–130)
GLUCOSE BLDC GLUCOMTR-MCNC: 178 MG/DL (ref 70–130)
GLUCOSE BLDC GLUCOMTR-MCNC: 69 MG/DL (ref 70–130)
GLUCOSE BLDC GLUCOMTR-MCNC: 77 MG/DL (ref 70–130)
LAB DIRECTOR NAME PROVIDER: NORMAL
SERPINA1 GENE MUT ANL BLD/T: NORMAL
SERPINA1 GENE MUT TESTED BLD/T: NORMAL

## 2022-06-08 PROCEDURE — 97116 GAIT TRAINING THERAPY: CPT

## 2022-06-08 PROCEDURE — 63710000001 INSULIN DETEMIR PER 5 UNITS: Performed by: FAMILY MEDICINE

## 2022-06-08 PROCEDURE — 97535 SELF CARE MNGMENT TRAINING: CPT | Performed by: OCCUPATIONAL THERAPIST

## 2022-06-08 PROCEDURE — 97530 THERAPEUTIC ACTIVITIES: CPT | Performed by: OCCUPATIONAL THERAPIST

## 2022-06-08 PROCEDURE — 63710000001 INSULIN DETEMIR PER 5 UNITS: Performed by: INTERNAL MEDICINE

## 2022-06-08 PROCEDURE — 97110 THERAPEUTIC EXERCISES: CPT

## 2022-06-08 PROCEDURE — 82962 GLUCOSE BLOOD TEST: CPT

## 2022-06-08 PROCEDURE — 63710000001 INSULIN ASPART PER 5 UNITS: Performed by: FAMILY MEDICINE

## 2022-06-08 PROCEDURE — 25010000002 VANCOMYCIN 5 G RECONSTITUTED SOLUTION: Performed by: FAMILY MEDICINE

## 2022-06-08 PROCEDURE — 25010000002 ENOXAPARIN PER 10 MG: Performed by: FAMILY MEDICINE

## 2022-06-08 PROCEDURE — 97110 THERAPEUTIC EXERCISES: CPT | Performed by: OCCUPATIONAL THERAPIST

## 2022-06-08 PROCEDURE — 97530 THERAPEUTIC ACTIVITIES: CPT

## 2022-06-08 RX ADMIN — VANCOMYCIN HYDROCHLORIDE 1750 MG: 5 INJECTION, POWDER, LYOPHILIZED, FOR SOLUTION INTRAVENOUS at 21:42

## 2022-06-08 RX ADMIN — NADOLOL 20 MG: 40 TABLET ORAL at 08:50

## 2022-06-08 RX ADMIN — INSULIN ASPART 15 UNITS: 100 INJECTION, SOLUTION INTRAVENOUS; SUBCUTANEOUS at 12:23

## 2022-06-08 RX ADMIN — Medication 10 ML: at 21:48

## 2022-06-08 RX ADMIN — INSULIN ASPART 15 UNITS: 100 INJECTION, SOLUTION INTRAVENOUS; SUBCUTANEOUS at 08:49

## 2022-06-08 RX ADMIN — VANCOMYCIN HYDROCHLORIDE 1750 MG: 5 INJECTION, POWDER, LYOPHILIZED, FOR SOLUTION INTRAVENOUS at 09:00

## 2022-06-08 RX ADMIN — Medication 10 ML: at 09:00

## 2022-06-08 RX ADMIN — NYSTATIN: 100000 POWDER TOPICAL at 21:47

## 2022-06-08 RX ADMIN — NYSTATIN: 100000 POWDER TOPICAL at 08:51

## 2022-06-08 RX ADMIN — INSULIN ASPART 2 UNITS: 100 INJECTION, SOLUTION INTRAVENOUS; SUBCUTANEOUS at 12:23

## 2022-06-08 RX ADMIN — ASPIRIN 81 MG: 81 TABLET, COATED ORAL at 08:50

## 2022-06-08 RX ADMIN — DOCUSATE SODIUM 50 MG AND SENNOSIDES 8.6 MG 2 TABLET: 8.6; 5 TABLET, FILM COATED ORAL at 09:00

## 2022-06-08 RX ADMIN — FUROSEMIDE 40 MG: 40 TABLET ORAL at 08:50

## 2022-06-08 RX ADMIN — INSULIN DETEMIR 45 UNITS: 100 INJECTION, SOLUTION SUBCUTANEOUS at 09:00

## 2022-06-08 RX ADMIN — Medication 1 TABLET: at 12:23

## 2022-06-08 RX ADMIN — FUROSEMIDE 40 MG: 40 TABLET ORAL at 17:03

## 2022-06-08 RX ADMIN — ATORVASTATIN CALCIUM 40 MG: 40 TABLET, FILM COATED ORAL at 21:48

## 2022-06-08 RX ADMIN — SPIRONOLACTONE 50 MG: 25 TABLET ORAL at 09:00

## 2022-06-08 RX ADMIN — INSULIN DETEMIR 20 UNITS: 100 INJECTION, SOLUTION SUBCUTANEOUS at 21:46

## 2022-06-08 RX ADMIN — Medication 10 ML: at 10:15

## 2022-06-08 RX ADMIN — METFORMIN HYDROCHLORIDE 500 MG: 500 TABLET ORAL at 08:50

## 2022-06-08 RX ADMIN — ENOXAPARIN SODIUM 40 MG: 40 INJECTION SUBCUTANEOUS at 21:45

## 2022-06-08 RX ADMIN — METFORMIN HYDROCHLORIDE 500 MG: 500 TABLET ORAL at 17:04

## 2022-06-08 RX ADMIN — PANTOPRAZOLE SODIUM 40 MG: 40 TABLET, DELAYED RELEASE ORAL at 05:45

## 2022-06-08 RX ADMIN — Medication 10 ML: at 21:49

## 2022-06-08 NOTE — PROGRESS NOTES
[facility]  PROGRESS NOTE     Patient Identification:  Name:  Melchor Hardwick  Age:  57 y.o.  Sex:  male  :  1965  MRN:  [id number]  Visit Number:  [id number]  ROOM: 109/2S     Primary Care Provider:  Missy Up APRN    Length of stay in inpatient status:  6    Subjective     Chief Compliant:  Critical illness myopathy    History of Presenting Illness: 57-year-old gentleman being treated for critical illness myopathy.  Patient is status post left midfoot amputation secondary to osteomyelitis with staphylococcal aureus bacteremia.  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      Seen while working with occupational therapy, working with therapy with no difficulty.  States he is sleeping well.  Denies any complaints or issues.  Vital signs are stable on room air.  Blood glucose this morning 77.    Objective     Current Hospital Meds:aspirin, 81 mg, Oral, Daily  atorvastatin, 40 mg, Oral, Nightly  enoxaparin, 40 mg, Subcutaneous, Nightly  furosemide, 40 mg, Oral, BID  Insulin Aspart, 0-7 Units, Subcutaneous, TID AC  Insulin Aspart, 15 Units, Subcutaneous, TID AC  insulin detemir, 20 Units, Subcutaneous, Q12H  metFORMIN, 500 mg, Oral, BID With Meals  multivitamin, 1 tablet, Oral, Daily  nadolol, 20 mg, Oral, Q24H  nystatin, , Topical, Q12H  pantoprazole, 40 mg, Oral, Q AM  senna-docusate sodium, 2 tablet, Oral, BID  sodium chloride, 10 mL, Intravenous, Q12H  sodium chloride, 10 mL, Intravenous, Q12H  spironolactone, 50 mg, Oral, Daily  vancomycin, 1,750 mg, Intravenous, Q12H       ----------------------------------------------------------------------------------------------------------------------  Vital Signs:  Temp:  [98.4 °F (36.9 °C)] 98.4 °F (36.9 °C)  Heart Rate:  [70-72] 70  Resp:  [18] 18  BP: (104-108)/(55-57) 108/55  SpO2:  [95 %] 95 %  on   ;   Device (Oxygen Therapy):  room air  Body mass index is 37.45 kg/m².    Wt Readings from Last 3 Encounters:   06/07/22 118 kg (261 lb)   06/02/22 117 kg (258 lb)     Intake & Output (last 3 days)       06/05 0701 06/06 0700 06/06 0701 06/07 0700 06/07 0701 06/08 0700 06/08 0701 06/09 0700    P.O. 1800 1440 1920 480    IV Piggyback      Total Intake(mL/kg) 2300 (19.5) 1940 (16.4) 2920 (24.7) 480 (4.1)    Urine (mL/kg/hr) 4400 (1.6) 5100 (1.8) 5550 (2)     Stool 0  0     Total Output 4400 5100 5550     Net -2100 -3160 -2630 +480            Stool Unmeasured Occurrence 2 x  3 x         Diet Regular; Cardiac, Consistent Carbohydrate, Daily Fluid Restriction; 1500 mL Fluid Per Day; High Protein  ----------------------------------------------------------------------------------------------------------------------  Physical exam:  Constitutional:   Awake and alert, seen while working with occupational therapy.  No complaints reported.  No acute distress.    HEENT: Normocephalic atraumatic  Neck:    Supple  Cardiovascular: Regular rate and rhythm  Pulmonary/Chest: Clear to auscultation  Abdominal: Positive bowel sounds.  Abdomen rounded, soft.  Musculoskeletal: Status post left forefoot amputation--wound appears to be healing well with no surrounding erythema or drainage  Neurological: Sensory change stocking glove distribution  Skin: No rash  Peripheral vascular: 2+ dependent edema in bilateral lower extremities but improved.  Genitourinary: Scrotal edema improved  ----------------------------------------------------------------------------------------------------------------------    Last echocardiogram:  Results for orders placed during the hospital encounter of 05/10/22    Adult Transthoracic Echo Complete W/ Cont if Necessary Per Protocol    Interpretation Summary  · Normal left ventricular cavity size and wall thickness noted. All left ventricular wall segments contract normally.  · Left ventricular ejection fraction appears to  be 61 - 65%.  · The mitral valve is structurally normal with no significant stenosis present. Trace mitral valve regurgitation is present.  · The aortic valve is structurally normal with no regurgitation or stenosis present.  · There is no evidence of pericardial effusion.    ----------------------------------------------------------------------------------------------------------------------  Results from last 7 days   Lab Units 06/06/22  0040 06/04/22  0030   WBC 10*3/mm3 4.84 5.58   HEMOGLOBIN g/dL 9.4* 10.1*   HEMATOCRIT % 29.7* 31.8*   MCV fL 79.4 79.1   MCHC g/dL 31.6 31.8   PLATELETS 10*3/mm3 117* 140         Results from last 7 days   Lab Units 06/06/22  1710 06/06/22  0040 06/04/22  1829 06/04/22  0030   SODIUM mmol/L  --  137  --  134*   POTASSIUM mmol/L 4.0 3.6 3.8 3.3*   CHLORIDE mmol/L  --  105  --  104   CO2 mmol/L  --  22.6  --  23.1   BUN mg/dL  --  18  --  21*   CREATININE mg/dL  --  0.73*  --  0.73*   CALCIUM mg/dL  --  7.9*  --  8.1*   GLUCOSE mg/dL  --  84  --  86   ALBUMIN g/dL  --   --   --  1.89*   BILIRUBIN mg/dL  --   --   --  0.4   ALK PHOS U/L  --   --   --  293*   AST (SGOT) U/L  --   --   --  47*   ALT (SGPT) U/L  --   --   --  37   Estimated Creatinine Clearance: 143.7 mL/min (A) (by C-G formula based on SCr of 0.73 mg/dL (L)).  No results found for: AMMONIA              Glucose   Date/Time Value Ref Range Status   06/08/2022 0610 77 70 - 130 mg/dL Final     Comment:     Meter: [id number] : [id number] Tracey Crystal   06/07/2022 1636 71 70 - 130 mg/dL Final     Comment:     Meter: [id number] : [id number] Negrete Eunice   06/07/2022 1059 248 (H) 70 - 130 mg/dL Final     Comment:     Meter: [id number] : [id number] Ritu Frederick   06/07/2022 0907 250 (H) 70 - 130 mg/dL Final     Comment:     Meter: [id number] : [id number] Steve Yancey   06/07/2022 0604 112 70 - 130 mg/dL Final     Comment:     Meter: [id number] : [id number] Tracey Crystal   06/06/2022 1636 127 70 -  130 mg/dL Final     Comment:     Meter: [id number] : [id number] lisa ramirez   06/06/2022 1120 91 70 - 130 mg/dL Final     Comment:     Meter: [id number] : [id number] leonie lino   06/06/2022 0602 139 (H) 70 - 130 mg/dL Final     Comment:     Meter: [id number] : [id number] Tracey Crystal     Lab Results   Component Value Date    TSH 4.700 (H) 05/30/2022    FREET4 0.87 (L) 05/30/2022     No results found for: PREGTESTUR, PREGSERUM, HCG, HCGQUANT  Pain Management Panel     Pain Management Panel Latest Ref Rng & Units 5/24/2022 5/11/2022    CREATININE UR mg/dL 91.0 -    AMPHETAMINES SCREEN, URINE Negative - Negative    BARBITURATES SCREEN Negative - Negative    BENZODIAZEPINE SCREEN, URINE Negative - Negative    BUPRENORPHINEUR Negative - Negative    COCAINE SCREEN, URINE Negative - Negative    METHADONE SCREEN, URINE Negative - Negative    METHAMPHETAMINEUR Negative - Negative        Brief Urine Lab Results  (Last result in the past 365 days)      Color   Clarity   Blood   Leuk Est   Nitrite   Protein   CREAT   Urine HCG        05/24/22 1839             91.0             No results found for: BLOODCX      No results found for: URINECX  No results found for: WOUNDCX  No results found for: STOOLCX        I have personally looked at the labs and they are summarized above.  ----------------------------------------------------------------------------------------------------------------------  Detailed radiology reports for the last 24 hours:    Imaging Results (Last 24 Hours)     ** No results found for the last 24 hours. **        Final impressions for the last 30 days of radiology reports:    CT Abdomen Pelvis Without Contrast    Result Date: 5/25/2022  1.  Advanced liver cirrhosis with changes of portal hypertension which include spinal megaly, prominent portosystemic collateral vessels, small-moderate volume ascites, and marked anasarca. 2.  Moderate constipation and mild generalized ileus. No bowel  obstruction. 3.  Miniscule pleural effusions. 4.  Other incidental and nonacute findings detailed above.  This report was finalized on 5/25/2022 8:48 AM by Dr. Brain Rodriguez MD.      XR Foot 3+ View Left    Result Date: 5/10/2022    Degenerative change of the midfoot.  This report was finalized on 5/10/2022 4:17 PM by Dr. Oswaldo Brown MD.      US Abdomen Complete    Result Date: 5/20/2022  1. Splenomegaly. 2. Small volume ascites.  This report was finalized on 5/20/2022 12:59 PM by Dr. Lobo Ring MD.      US Scrotum & Testicles    Result Date: 5/20/2022  Extensive scrotal wall swelling.   This report was finalized on 5/20/2022 12:59 PM by Dr. Lobo Ring MD.      US Scrotum & Testicles    Result Date: 5/16/2022  Scrotal wall thickening  This report was finalized on 5/16/2022 4:57 PM by Dr. Lobo Ring MD.      US Arterial Doppler Lower Extremity Left    Result Date: 5/10/2022  Abnormal examination demonstrating elevated flow velocities throughout the left lower extremity arterial system and biphasic flow within the left posterior tibial artery. Contemporaneously performed CT demonstrates imaging findings highly suspicious for gas gangrene and infection/osteomyelitis. CT dictated separately. Signer Name: Oscar Dixon MD  Signed: 5/10/2022 7:16 PM  Workstation Name: [facility]  [facility]    MRI Foot Left Without Contrast    Result Date: 5/16/2022  Appearance concerning for osteomyelitis involving the 3rd metatarsal and likely the adjacent cuneiform.  This report was finalized on 5/16/2022 3:10 PM by Dr. Lobo Ring MD.      US Venous Doppler Lower Extremity Bilateral (duplex)    Result Date: 5/24/2022  No sonographic findings of DVT in the lower extremities.  This report was finalized on 5/24/2022 8:05 AM by Dr. Garry Lovelace II, MD.      CT Lower Extremity Left With Contrast    Result Date: 5/10/2022  Marked soft tissue edema and fat stranding at the level of the foot, diffuse  with scattered foci of air both within soft tissues and bony structures. Findings are concerning for gas gangrene and osteomyelitis. Both the midfoot and forefoot are involved. Signer Name: ANTELMO COFFMAN MD  Signed: 5/10/2022 6:14 PM  Workstation Name: [id number]  [facility]    XR Foreign Body For MRI    Result Date: 5/16/2022  No radiopaque foreign body identified  This report was finalized on 5/16/2022 10:20 AM by Dr. Lobo Ring MD.      I have personally looked at the radiology images and read the final radiology report.    Assessment & Plan    Critical illness myopathy--participated with physical occupational therapy on 6/7/2022: Participated with transfer activities and standby assist to minimal assist with verbal/nonverbal cues; utilizes wheelchair/rolled walker for chair to bed, sit/stand/stand/transfer; ambulated 20 feet with front wheeled walker; required contact-guard assist with verbal cues for bathing activity; set up assist for upper body dressing; contact-guard assist with verbal cues for lower body dressing; set up assist for grooming; participated with gross motor coordination activities; therapeutic exercise/ROM    Status post left foot forefoot amputation-- Dr. Colby is unavailable until next Monday.  Continue IV antibiotic therapy.  CRP in AM.    Staphylococcal aureus bacteremia--continue vancomycin through June 23    Generalized edema with scrotal edema-- continue Lasix 40 mg twice daily.  Scrotal edema improved    Cirrhosis secondary to [place]-- nadolol.  Likely benefit from outpatient GI evaluation May need screening for esophageal varices    Diabetes mellitus--low BG this morning at 77, Levemir changed to 20 units twice daily    Anemia-- stable.  Labs in AM.    Thrombocytopenia--platelets 117    Hypertension-- continue current treatment    Obesity with BMI of 37.39 kg per metered square    Hyperlipidemia-- continue statin therapy      VTE Prophylaxis:   Mechanical Order  History:      Ordered        06/02/22 1707  Maintain Sequential Compression Device  Continuous                    Pharmalogical Order History:      Ordered     Dose Route Frequency Stop    06/02/22 1707  Enoxaparin Sodium (LOVENOX) syringe 40 mg         40 mg SC Nightly --                Scribed for Marilynn Giordano MD by ROSALINA Friend. 6/8/2022  11:26 EDT       ROSALINA Friend  06/08/22  11:26 EDT       This was an audio and video enabled telemedicine encounter. I was unable to see the patient face to face as I was in COVID quarantine. The entire visit was performed on audio and video while the APRN performed the physical exam.      Marilynn Giordano MD  06/10/22  08:57 EDT

## 2022-06-08 NOTE — PLAN OF CARE
Goal Outcome Evaluation:      Patient c/o little discomfort with his foot tonight, meds given as MD ordered, will continue to monitor.            none

## 2022-06-08 NOTE — THERAPY TREATMENT NOTE
Inpatient Rehabilitation - Physical Therapy Treatment Note        [facility]     Patient Name: Melchor Hardwick  : 1965  MRN: [id number]    Today's Date: 2022                    Admit Date: 2022      Visit Dx:   No diagnosis found.    Patient Active Problem List   Diagnosis   • Hyperlipidemia   • Hypertensive disorder   • Kidney disease   • Obesity   • Type 2 diabetes mellitus (HCC)   • Gas gangrene of foot (HCC)   • Cellulitis in diabetic foot (HCC)   • Other acute osteomyelitis of left foot (HCC)   • Osteomyelitis (HCC)   • Critical illness myopathy       Past Medical History:   Diagnosis Date   • Diabetes mellitus (HCC)    • Elevated cholesterol    • Hyperlipidemia    • Hypertension        Past Surgical History:   Procedure Laterality Date   • ABSCESS DRAINAGE      PERINEUM   • AMPUTATION FOOT Left 2022    Procedure: AMPUTATION FOOT;  Surgeon: Addison Colby MD;  Location: [facility];  Service: Podiatry;  Laterality: Left;   • INCISION AND DRAINAGE LEG Left 05/10/2022    Procedure: INCISION AND DRAINAGE LOWER EXTREMITY;  Surgeon: Addison Colby MD;  Location: [facility];  Service: Podiatry;  Laterality: Left;   • WOUND DEBRIDEMENT Left 2022    Procedure: DEBRIDEMENT FOOT;  Surgeon: Addison Colby MD;  Location: [facility];  Service: Podiatry;  Laterality: Left;       PT ASSESSMENT (last 12 hours)     IRF PT Evaluation and Treatment     Row Name 22 1321          PT Time and Intention    Document Type daily treatment  am session  -RG     Mode of Treatment individual therapy;physical therapy  -RG     Patient/Family/Caregiver Comments/Observations Pt and nursing in agreement for skilled PT on this date. Pt was able to ambulate 8' in //bars and 20' using RW.  -     Row Name 22 1323          General Information    Patient Profile Reviewed yes  -RG     Existing Precautions/Restrictions fall;non-weight bearing  hernandez, <skin integrity L foot  -     Row Name 22 5806           Pain Scale: FACES Pre/Post-Treatment    Pain: FACES Scale, Pretreatment 6-->hurts even more  -RG     Posttreatment Pain Rating 6-->hurts even more  -RG     Row Name 06/08/22 1325          Cognition/Psychosocial    Affect/Mental Status (Cognition) WFL  -RG     Follows Commands (Cognition) WFL  -RG     Personal Safety Interventions gait belt;nonskid shoes/slippers when out of bed;fall prevention program maintained  -     Row Name 06/08/22 1325          Mobility    Left Lower Extremity (Weight-bearing Status) non weight-bearing (NWB)  -RG     Right Lower Extremity (Weight-bearing Status) weight-bearing as tolerated (WBAT)  -RG     Row Name 06/08/22 1325          Transfers    Bed-Chair [place] (Transfers) minimum assist (75% patient effort);moderate assist (50% patient effort);verbal cues;nonverbal cues (demo/gesture)  -RG     Chair-Bed [place] (Transfers) minimum assist (75% patient effort);moderate assist (50% patient effort);verbal cues;nonverbal cues (demo/gesture)  -RG     Assistive Device (Bed-Chair Transfers) wheelchair;walker, front-wheeled  -RG     Sit-Stand [place] (Transfers) verbal cues;nonverbal cues (demo/gesture);minimum assist (75% patient effort)  -RG     Stand-Sit [place] (Transfers) verbal cues;nonverbal cues (demo/gesture);contact guard  -     Row Name 06/08/22 1325          Chair-Bed Transfer    Assistive Device (Chair-Bed Transfers) wheelchair;walker, front-wheeled  -     Row Name 06/08/22 1325          Sit-Stand Transfer    Assistive Device (Sit-Stand Transfers) walker, front-wheeled  -RG     Row Name 06/08/22 1325          Stand-Sit Transfer    Assistive Device (Stand-Sit Transfers) wheelchair;walker, front-wheeled  -RG     Row Name 06/08/22 1325          Gait/Stairs (Locomotion)    [place] Level (Gait) minimum assist (75% patient effort);moderate assist (50% patient effort)  -     Assistive Device (Gait) walker, front-wheeled  -RG     Distance in Feet (Gait) 8'  in // bars and 20' RW  -RG     Pattern (Gait) --  hop to  -RG     Comment, (Gait/Stairs) unsteadiness noted with sit to stand tf using RW in preparation for gait.  Fatigues easily.  -     Row Name 06/08/22 1325          Hip (Therapeutic Exercise)    Hip Strengthening (Therapeutic Exercise) bilateral;flexion;aBduction;aDduction;marching while seated;marching while standing;sitting;standing;resistance band;green;10 repetitions;2 sets  L only in standing  -RG     Row Name 06/08/22 1325          Knee (Therapeutic Exercise)    Knee Strengthening (Therapeutic Exercise) bilateral;flexion;extension;marching while seated;marching while standing;LAQ (long arc quad);hamstring curls;sitting;standing;resistance band;green;10 repetitions;2 sets  L only in standing  -RG     Row Name 06/08/22 1325          Ankle (Therapeutic Exercise)    Ankle Strengthening (Therapeutic Exercise) right;dorsiflexion;plantarflexion;sitting;20 repititions  -     Row Name 06/08/22 1325          Positioning and Restraints    Pre-Treatment Position in bed  -RG     Post Treatment Position wheelchair  -RG     In Wheelchair notified nsg;sitting;with OT;legs elevated  -     Row Name 06/08/22 1325          Therapy Assessment/Plan (PT)    Patient's Goals For Discharge return home  -     Row Name 06/08/22 1325          Therapy Assessment/Plan (PT)    Rehab Potential/Prognosis (PT) adequate, monitor progress closely  -RG     Frequency of Treatment (PT) 5 times per week  -RG     Estimated Duration of Therapy (PT) 1 week;2 weeks  -RG     Problem List (PT) balance;mobility;range of motion (ROM);strength;pain  -RG     Activity Limitations Related to Problem List (PT) unable to ambulate safely;unable to transfer safely  -     Row Name 06/08/22 1325          Therapy Plan Review/Discharge Plan (PT)    Anticipated Equipment Needs at Discharge (PT Eval) --  tbd  -RG     Anticipated Discharge Disposition (PT) home with home health  -     Row Name 06/08/22 1325           IRF PT Goals    Bed Mobility Goal Selection (PT-IRF) bed mobility, PT goal 1  -RG     Transfer Goal Selection (PT-IRF) transfers, PT goal 1  -RG     Gait (Walking Locomotion) Goal Selection (PT-IRF) gait, PT goal 1  -RG     Row Name 06/08/22 1325          Bed Mobility Goal 1 (PT-IRF)    Activity/Assistive Device (Bed Mobility Goal 1, PT-IRF) sit to supine/supine to sit  -RG     [place] Level (Bed Mobility Goal 1, PT-IRF) modified independence  -RG     Time Frame (Bed Mobility Goal 1, PT-IRF) by discharge  -RG     Row Name 06/08/22 1325          Transfer Goal 1 (PT-IRF)    Activity/Assistive Device (Transfer Goal 1, PT-IRF) sit-to-stand/stand-to-sit;bed-to-chair/chair-to-bed  -RG     [place] Level (Transfer Goal 1, PT-IRF) supervision required  -RG     Time Frame (Transfer Goal 1, PT-IRF) by discharge  -RG     Row Name 06/08/22 1325          Gait/Walking Locomotion Goal 1 (PT-IRF)    Activity/Assistive Device (Gait/Walking Locomotion Goal 1, PT-IRF) walker, rolling  -RG     Gait/Walking Locomotion Distance Goal 1 (PT-IRF) 2'  -RG     [place] Level (Gait/Walking Locomotion Goal 1, PT-IRF) supervision required  -RG     Time Frame (Gait/Walking Locomotion Goal 1, PT-IRF) by discharge  -RG           User Key  (r) = Recorded By, (t) = Taken By, (c) = Cosigned By    Initials Name Provider Type    RG Michi Mckeon PTA Physical Therapist Assistant              Wound 05/16/22 1340 coccyx Pressure Injury (Active)   Dressing Appearance open to air 06/08/22 0730   Closure None 06/07/22 2033   Base red;blanchable 06/07/22 2033   Periwound blanchable;redness 06/07/22 2033   Periwound Temperature warm 06/07/22 2033   Periwound Skin Turgor soft 06/07/22 2033   Edges irregular 06/07/22 2033   Drainage Amount none 06/07/22 2033   Care, Wound barrier applied 06/07/22 2033   Dressing Care open to air 06/08/22 0730   Periwound Care barrier ointment applied 06/07/22 2033       Wound 05/18/22 1545 Left other (see  comments) foot Incision (Active)   Dressing Appearance intact 06/08/22 0730   Closure [place];Sutures 06/07/22 2033   Base dressing in place, unable to visualize 06/07/22 2033   Periwound intact 06/07/22 2033   Periwound Temperature warm 06/07/22 2033   Periwound Skin Turgor firm 06/07/22 2033   Edges rolled/closed 06/07/22 1806   Drainage Characteristics/Odor bleeding controlled 06/07/22 2033   Drainage Amount none 06/07/22 2033   Dressing Care dressing changed 06/07/22 2033       Wound 06/02/22 1240 scrotum MASD (Moisture associated skin damage) (Active)   Dressing Appearance open to air 06/08/22 0730   Closure Open to air 06/08/22 0730   Base red;moist 06/07/22 2033     Physical Therapy Education                 Title: PT OT SLP Therapies (Done)     Topic: Physical Therapy (Done)     Point: Mobility training (Done)     Learning Progress Summary           Patient Acceptance, D,E, VU,NR by  at 6/8/2022 1332      Show all documentation for this point (6)                 Point: Home exercise program (Done)     Learning Progress Summary           Patient Acceptance, D,E, VU,NR by  at 6/8/2022 1332      Show all documentation for this point (6)                 Point: Body mechanics (Done)     Learning Progress Summary           Patient Acceptance, D,E, VU,NR by  at 6/8/2022 1332      Show all documentation for this point (6)                 Point: Precautions (Done)     Learning Progress Summary           Patient Acceptance, D,E, VU,NR by  at 6/8/2022 1332      Show all documentation for this point (6)                             User Key     Initials Effective Dates Name Provider Type Discipline     06/16/21 -  Michi Mckeon PTA Physical Therapist Assistant PT                PT Recommendation and Plan    Frequency of Treatment (PT): 5 times per week  Anticipated Equipment Needs at Discharge (PT Eval):  (tbd)                  Time Calculation:      PT Charges     Row Name 06/08/22 1332             Time  Calculation    Start Time 0745  -RG      Stop Time 0915  -RG      Time Calculation (min) 90 min  -RG      PT Received On 06/08/22  -RG              Time Calculation- PT    Total Timed Code Minutes- PT 90 minute(s)  -RG            User Key  (r) = Recorded By, (t) = Taken By, (c) = Cosigned By    Initials Name Provider Type     Michi Mckeon PTA Physical Therapist Assistant                Therapy Charges for Today     Code Description Service Date Service Provider Modifiers Qty    94961499103 HC PT THERAPEUTIC ACT EA 15 MIN 6/7/2022 Michi Mckeon PTA GP, CQ 1    09924951982 HC PT THER PROC EA 15 MIN 6/7/2022 Michi Mckeon PTA GP, CQ 2    74239494994 HC PT THERAPEUTIC ACT EA 15 MIN 6/7/2022 Michi Mckeon PTA GP, CQ 2    76866130845 HC PT THER PROC EA 15 MIN 6/7/2022 Michi Mckeon PTA GP, CQ 1    28973187709 HC GAIT TRAINING EA 15 MIN 6/8/2022 Michi Mckeon, LIZETT GP, CQ 1    07929026245 HC PT THERAPEUTIC ACT EA 15 MIN 6/8/2022 Michi Mckeon PTA GP, CQ 2    26615417597 HC PT THER PROC EA 15 MIN 6/8/2022 Michi Mckeon PTA GP, CQ 3                   Michi Mckeon PTA  6/8/2022

## 2022-06-08 NOTE — THERAPY TREATMENT NOTE
Inpatient Rehabilitation - Occupational Therapy Treatment Note     [facility]     Patient Name: Melchor Hardwick  : 1965  MRN: [id number]    Today's Date: 2022                 Admit Date: 2022       No diagnosis found.    Patient Active Problem List   Diagnosis   • Hyperlipidemia   • Hypertensive disorder   • Kidney disease   • Obesity   • Type 2 diabetes mellitus (HCC)   • Gas gangrene of foot (HCC)   • Cellulitis in diabetic foot (HCC)   • Other acute osteomyelitis of left foot (HCC)   • Osteomyelitis (HCC)   • Critical illness myopathy       Past Medical History:   Diagnosis Date   • Diabetes mellitus (HCC)    • Elevated cholesterol    • Hyperlipidemia    • Hypertension        Past Surgical History:   Procedure Laterality Date   • ABSCESS DRAINAGE      PERINEUM   • AMPUTATION FOOT Left 2022    Procedure: AMPUTATION FOOT;  Surgeon: Addison Colby MD;  Location: [facility];  Service: Podiatry;  Laterality: Left;   • INCISION AND DRAINAGE LEG Left 05/10/2022    Procedure: INCISION AND DRAINAGE LOWER EXTREMITY;  Surgeon: Addison Colby MD;  Location: [facility];  Service: Podiatry;  Laterality: Left;   • WOUND DEBRIDEMENT Left 2022    Procedure: DEBRIDEMENT FOOT;  Surgeon: Addison Colby MD;  Location: [facility];  Service: Podiatry;  Laterality: Left;             IRF OT ASSESSMENT FLOWSHEET (last 12 hours)     IRF OT Evaluation and Treatment     Row Name 22 1436          OT Time and Intention    Document Type daily treatment  -BF     Mode of Treatment occupational therapy  -BF     Patient Effort good  -BF     Symptoms Noted During/After Treatment none  -BF     Row Name 22 1436          General Information    Existing Precautions/Restrictions fall;non-weight bearing;left  hernandez, <skin integrity  -BF     Limitations/Impairments safety/cognitive  -BF     Row Name 22 1436          Cognition/Psychosocial    Orientation Status (Cognition) oriented x 3  -BF     Follows Commands  (Cognition) WFL  -     Row Name 06/08/22 1436          Transfers    Bed-Chair [place] (Transfers) minimum assist (75% patient effort);verbal cues;nonverbal cues (demo/gesture)  -     Chair-Bed [place] (Transfers) minimum assist (75% patient effort);verbal cues;nonverbal cues (demo/gesture)  -     Assistive Device (Bed-Chair Transfers) wheelchair  bed rails  -     Row Name 06/08/22 1436          Chair-Bed Transfer    Assistive Device (Chair-Bed Transfers) wheelchair  bed rails  -     Row Name 06/08/22 1436          Motor Skills    Motor Control/Coordination Interventions gross motor coordination activities;therapeutic exercise/ROM  BUE GMC therex, strengthening; BUE PRE 20 mins; BUE rickshaw 25 lbs X20X5; flexbar, pushbox therex  -     Row Name 06/08/22 1436          Positioning and Restraints    In Bed supine;call light within reach;encouraged to call for assist;LLE elevated  after both sessions  -           User Key  (r) = Recorded By, (t) = Taken By, (c) = Cosigned By    Initials Name Effective Dates     Mandi Smith OT 06/16/21 -                  Occupational Therapy Education                 Title: PT OT SLP Therapies (Done)     Topic: Occupational Therapy (Done)     Point: ADL training (Done)     Description:   Instruct learner(s) on proper safety adaptation and remediation techniques during self care or transfers.   Instruct in proper use of assistive devices.              Learning Progress Summary           Patient Acceptance, E, VU,NR by  at 6/8/2022 1436      Show all documentation for this point (4)                 Point: Precautions (Done)     Description:   Instruct learner(s) on prescribed precautions during self-care and functional transfers.              Learning Progress Summary           Patient Acceptance, E, VU,NR by  at 6/8/2022 1436      Show all documentation for this point (4)                             User Key     Initials Effective Dates Name  Provider Type Discipline     06/16/21 -  Mandi Smith OT Occupational Therapist OT                    OT Recommendation and Plan    Planned Therapy Interventions (OT): activity tolerance training, adaptive equipment training, BADL retraining, ROM/therapeutic exercise, strengthening exercise, transfer/mobility retraining                    Time Calculation:      Time Calculation- OT     Row Name 06/08/22 1440 06/08/22 0915          Time Calculation- OT    OT Start Time 1330  -BF 0915  -BF     OT Stop Time 1415  -BF 1000  -BF     OT Time Calculation (min) 45 min  -BF 45 min  -BF     Total Timed Code Minutes- OT 45 minute(s)  -BF 45 minute(s)  -BF     OT Non-Billable Time (min) -- 10 min  -BF           User Key  (r) = Recorded By, (t) = Taken By, (c) = Cosigned By    Initials Name Provider Type    BF Mandi Smith, OT Occupational Therapist              Therapy Charges for Today     Code Description Service Date Service Provider Modifiers Qty    46189436621 HC OT SELF CARE/MGMT/TRAIN EA 15 MIN 6/7/2022 Mandi Smith OT GO 3    66379986521 HC OT THER PROC EA 15 MIN 6/7/2022 Mandi Smith OT GO 3    62155985198 HC OT SELF CARE/MGMT/TRAIN EA 15 MIN 6/8/2022 Mandi Smith OT GO 1    81360838627 HC OT THERAPEUTIC ACT EA 15 MIN 6/8/2022 Mandi Smith OT GO 2    24080039350 HC OT THER PROC EA 15 MIN 6/8/2022 Mandi Smith OT GO 3                   Mandi Smith OT  6/8/2022

## 2022-06-09 LAB
ALBUMIN SERPL-MCNC: 1.87 G/DL (ref 3.5–5.2)
ALBUMIN/GLOB SERPL: 0.6 G/DL
ALP SERPL-CCNC: 275 U/L (ref 39–117)
ALT SERPL W P-5'-P-CCNC: 48 U/L (ref 1–41)
ANION GAP SERPL CALCULATED.3IONS-SCNC: 9 MMOL/L (ref 5–15)
APTT PPP: 32.5 SECONDS (ref 26.5–34.5)
AST SERPL-CCNC: 74 U/L (ref 1–40)
BASOPHILS # BLD AUTO: 0.01 10*3/MM3 (ref 0–0.2)
BASOPHILS NFR BLD AUTO: 0.3 % (ref 0–1.5)
BILIRUB SERPL-MCNC: 0.4 MG/DL (ref 0–1.2)
BUN SERPL-MCNC: 17 MG/DL (ref 6–20)
BUN/CREAT SERPL: 24.3 (ref 7–25)
CALCIUM SPEC-SCNC: 7.8 MG/DL (ref 8.6–10.5)
CHLORIDE SERPL-SCNC: 99 MMOL/L (ref 98–107)
CO2 SERPL-SCNC: 25 MMOL/L (ref 22–29)
CREAT SERPL-MCNC: 0.7 MG/DL (ref 0.76–1.27)
CRP SERPL-MCNC: 3.19 MG/DL (ref 0–0.5)
DEPRECATED RDW RBC AUTO: 49.9 FL (ref 37–54)
EGFRCR SERPLBLD CKD-EPI 2021: 107.5 ML/MIN/1.73
EOSINOPHIL # BLD AUTO: 0.12 10*3/MM3 (ref 0–0.4)
EOSINOPHIL NFR BLD AUTO: 3.1 % (ref 0.3–6.2)
ERYTHROCYTE [DISTWIDTH] IN BLOOD BY AUTOMATED COUNT: 17.2 % (ref 12.3–15.4)
FERRITIN SERPL-MCNC: 188.5 NG/ML (ref 30–400)
FIBRINOGEN PPP-MCNC: 390 MG/DL (ref 173–524)
GLOBULIN UR ELPH-MCNC: 3.3 GM/DL
GLUCOSE BLDC GLUCOMTR-MCNC: 85 MG/DL (ref 70–130)
GLUCOSE BLDC GLUCOMTR-MCNC: 89 MG/DL (ref 70–130)
GLUCOSE BLDC GLUCOMTR-MCNC: 93 MG/DL (ref 70–130)
GLUCOSE SERPL-MCNC: 159 MG/DL (ref 65–99)
HCT VFR BLD AUTO: 28.1 % (ref 37.5–51)
HGB BLD-MCNC: 8.8 G/DL (ref 13–17.7)
IMM GRANULOCYTES # BLD AUTO: 0.01 10*3/MM3 (ref 0–0.05)
IMM GRANULOCYTES NFR BLD AUTO: 0.3 % (ref 0–0.5)
INR PPP: 1.06 (ref 0.9–1.1)
IRON 24H UR-MRATE: 29 MCG/DL (ref 59–158)
IRON SATN MFR SERPL: 11 % (ref 20–50)
LDH SERPL-CCNC: 236 U/L (ref 135–225)
LYMPHOCYTES # BLD AUTO: 1.65 10*3/MM3 (ref 0.7–3.1)
LYMPHOCYTES NFR BLD AUTO: 42 % (ref 19.6–45.3)
MCH RBC QN AUTO: 24.9 PG (ref 26.6–33)
MCHC RBC AUTO-ENTMCNC: 31.3 G/DL (ref 31.5–35.7)
MCV RBC AUTO: 79.4 FL (ref 79–97)
MONOCYTES # BLD AUTO: 0.41 10*3/MM3 (ref 0.1–0.9)
MONOCYTES NFR BLD AUTO: 10.4 % (ref 5–12)
NEUTROPHILS NFR BLD AUTO: 1.73 10*3/MM3 (ref 1.7–7)
NEUTROPHILS NFR BLD AUTO: 43.9 % (ref 42.7–76)
NRBC BLD AUTO-RTO: 0 /100 WBC (ref 0–0.2)
PLATELET # BLD AUTO: 88 10*3/MM3 (ref 140–450)
PMV BLD AUTO: 10.3 FL (ref 6–12)
POTASSIUM SERPL-SCNC: 3.5 MMOL/L (ref 3.5–5.2)
PROT SERPL-MCNC: 5.2 G/DL (ref 6–8.5)
PROTHROMBIN TIME: 14 SECONDS (ref 12.1–14.7)
RBC # BLD AUTO: 3.54 10*6/MM3 (ref 4.14–5.8)
RETICS # AUTO: 0.06 10*6/MM3 (ref 0.02–0.13)
RETICS/RBC NFR AUTO: 1.7 % (ref 0.7–1.9)
SODIUM SERPL-SCNC: 133 MMOL/L (ref 136–145)
TIBC SERPL-MCNC: 271 MCG/DL (ref 298–536)
TRANSFERRIN SERPL-MCNC: 182 MG/DL (ref 200–360)
WBC NRBC COR # BLD: 3.93 10*3/MM3 (ref 3.4–10.8)

## 2022-06-09 PROCEDURE — 82962 GLUCOSE BLOOD TEST: CPT

## 2022-06-09 PROCEDURE — 84466 ASSAY OF TRANSFERRIN: CPT | Performed by: INTERNAL MEDICINE

## 2022-06-09 PROCEDURE — 83540 ASSAY OF IRON: CPT | Performed by: INTERNAL MEDICINE

## 2022-06-09 PROCEDURE — 97530 THERAPEUTIC ACTIVITIES: CPT

## 2022-06-09 PROCEDURE — 97110 THERAPEUTIC EXERCISES: CPT

## 2022-06-09 PROCEDURE — 25010000002 VANCOMYCIN 5 G RECONSTITUTED SOLUTION: Performed by: FAMILY MEDICINE

## 2022-06-09 PROCEDURE — 85025 COMPLETE CBC W/AUTO DIFF WBC: CPT | Performed by: INTERNAL MEDICINE

## 2022-06-09 PROCEDURE — 82746 ASSAY OF FOLIC ACID SERUM: CPT | Performed by: INTERNAL MEDICINE

## 2022-06-09 PROCEDURE — 83010 ASSAY OF HAPTOGLOBIN QUANT: CPT | Performed by: INTERNAL MEDICINE

## 2022-06-09 PROCEDURE — 85060 BLOOD SMEAR INTERPRETATION: CPT | Performed by: INTERNAL MEDICINE

## 2022-06-09 PROCEDURE — 85730 THROMBOPLASTIN TIME PARTIAL: CPT | Performed by: INTERNAL MEDICINE

## 2022-06-09 PROCEDURE — 85045 AUTOMATED RETICULOCYTE COUNT: CPT | Performed by: INTERNAL MEDICINE

## 2022-06-09 PROCEDURE — 83615 LACTATE (LD) (LDH) ENZYME: CPT | Performed by: INTERNAL MEDICINE

## 2022-06-09 PROCEDURE — 80053 COMPREHEN METABOLIC PANEL: CPT | Performed by: INTERNAL MEDICINE

## 2022-06-09 PROCEDURE — 85610 PROTHROMBIN TIME: CPT | Performed by: INTERNAL MEDICINE

## 2022-06-09 PROCEDURE — 97116 GAIT TRAINING THERAPY: CPT

## 2022-06-09 PROCEDURE — 82728 ASSAY OF FERRITIN: CPT | Performed by: INTERNAL MEDICINE

## 2022-06-09 PROCEDURE — 63710000001 INSULIN DETEMIR PER 5 UNITS: Performed by: INTERNAL MEDICINE

## 2022-06-09 PROCEDURE — 63710000001 INSULIN ASPART PER 5 UNITS: Performed by: FAMILY MEDICINE

## 2022-06-09 PROCEDURE — 86022 PLATELET ANTIBODIES: CPT | Performed by: INTERNAL MEDICINE

## 2022-06-09 PROCEDURE — 99253 IP/OBS CNSLTJ NEW/EST LOW 45: CPT | Performed by: INTERNAL MEDICINE

## 2022-06-09 PROCEDURE — 86140 C-REACTIVE PROTEIN: CPT | Performed by: INTERNAL MEDICINE

## 2022-06-09 PROCEDURE — 82607 VITAMIN B-12: CPT | Performed by: INTERNAL MEDICINE

## 2022-06-09 PROCEDURE — 85384 FIBRINOGEN ACTIVITY: CPT | Performed by: INTERNAL MEDICINE

## 2022-06-09 RX ORDER — POTASSIUM CHLORIDE 1500 MG/1
40 TABLET, FILM COATED, EXTENDED RELEASE ORAL EVERY 4 HOURS
Status: COMPLETED | OUTPATIENT
Start: 2022-06-09 | End: 2022-06-09

## 2022-06-09 RX ADMIN — INSULIN DETEMIR 20 UNITS: 100 INJECTION, SOLUTION SUBCUTANEOUS at 09:44

## 2022-06-09 RX ADMIN — FUROSEMIDE 40 MG: 40 TABLET ORAL at 08:47

## 2022-06-09 RX ADMIN — INSULIN ASPART 15 UNITS: 100 INJECTION, SOLUTION INTRAVENOUS; SUBCUTANEOUS at 08:48

## 2022-06-09 RX ADMIN — POTASSIUM CHLORIDE 40 MEQ: 20 TABLET, EXTENDED RELEASE ORAL at 08:47

## 2022-06-09 RX ADMIN — ASPIRIN 81 MG: 81 TABLET, COATED ORAL at 08:47

## 2022-06-09 RX ADMIN — FUROSEMIDE 40 MG: 40 TABLET ORAL at 17:04

## 2022-06-09 RX ADMIN — Medication 10 ML: at 08:52

## 2022-06-09 RX ADMIN — VANCOMYCIN HYDROCHLORIDE 1750 MG: 5 INJECTION, POWDER, LYOPHILIZED, FOR SOLUTION INTRAVENOUS at 20:57

## 2022-06-09 RX ADMIN — DOCUSATE SODIUM 50 MG AND SENNOSIDES 8.6 MG 2 TABLET: 8.6; 5 TABLET, FILM COATED ORAL at 08:48

## 2022-06-09 RX ADMIN — Medication 1 TABLET: at 08:47

## 2022-06-09 RX ADMIN — Medication 10 ML: at 20:53

## 2022-06-09 RX ADMIN — METFORMIN HYDROCHLORIDE 500 MG: 500 TABLET ORAL at 17:04

## 2022-06-09 RX ADMIN — NADOLOL 20 MG: 40 TABLET ORAL at 08:48

## 2022-06-09 RX ADMIN — METFORMIN HYDROCHLORIDE 500 MG: 500 TABLET ORAL at 08:47

## 2022-06-09 RX ADMIN — PANTOPRAZOLE SODIUM 40 MG: 40 TABLET, DELAYED RELEASE ORAL at 05:27

## 2022-06-09 RX ADMIN — NYSTATIN: 100000 POWDER TOPICAL at 20:56

## 2022-06-09 RX ADMIN — Medication 10 ML: at 08:51

## 2022-06-09 RX ADMIN — VANCOMYCIN HYDROCHLORIDE 1750 MG: 5 INJECTION, POWDER, LYOPHILIZED, FOR SOLUTION INTRAVENOUS at 08:51

## 2022-06-09 RX ADMIN — POTASSIUM CHLORIDE 40 MEQ: 20 TABLET, EXTENDED RELEASE ORAL at 12:07

## 2022-06-09 RX ADMIN — SPIRONOLACTONE 50 MG: 25 TABLET ORAL at 08:48

## 2022-06-09 RX ADMIN — NYSTATIN: 100000 POWDER TOPICAL at 08:50

## 2022-06-09 RX ADMIN — ATORVASTATIN CALCIUM 40 MG: 40 TABLET, FILM COATED ORAL at 20:53

## 2022-06-09 RX ADMIN — INSULIN DETEMIR 20 UNITS: 100 INJECTION, SOLUTION SUBCUTANEOUS at 20:54

## 2022-06-09 NOTE — THERAPY TREATMENT NOTE
Inpatient Rehabilitation - Physical Therapy Treatment Note        [place]     Patient Name: eMlchor Hardwick  : 1965  MRN: [id number]    Today's Date: 2022                    Admit Date: 2022      Visit Dx:   No diagnosis found.    Patient Active Problem List   Diagnosis   • Hyperlipidemia   • Hypertensive disorder   • Kidney disease   • Obesity   • Type 2 diabetes mellitus (HCC)   • Gas gangrene of foot (HCC)   • Cellulitis in diabetic foot (HCC)   • Other acute osteomyelitis of left foot (HCC)   • Osteomyelitis (HCC)   • Critical illness myopathy       Past Medical History:   Diagnosis Date   • Diabetes mellitus (HCC)    • Elevated cholesterol    • Hyperlipidemia    • Hypertension        Past Surgical History:   Procedure Laterality Date   • ABSCESS DRAINAGE      PERINEUM   • AMPUTATION FOOT Left 2022    Procedure: AMPUTATION FOOT;  Surgeon: Addison Colby MD;  Location: [facility];  Service: Podiatry;  Laterality: Left;   • INCISION AND DRAINAGE LEG Left 05/10/2022    Procedure: INCISION AND DRAINAGE LOWER EXTREMITY;  Surgeon: Addison Colby MD;  Location: [facility];  Service: Podiatry;  Laterality: Left;   • WOUND DEBRIDEMENT Left 2022    Procedure: DEBRIDEMENT FOOT;  Surgeon: Addison Colby MD;  Location: [facility];  Service: Podiatry;  Laterality: Left;       PT ASSESSMENT (last 12 hours)     IRF PT Evaluation and Treatment     Row Name 22 1006          PT Time and Intention    Document Type daily treatment  am session  -HR     Mode of Treatment individual therapy;physical therapy  -HR     Patient/Family/Caregiver Comments/Observations Pt and JD Burks in agreement for PT on this date. Pt gives good effort and does well throughout Rx.  -HR     Row Name 22 1006          General Information    Patient Profile Reviewed yes  -HR     Existing Precautions/Restrictions fall;non-weight bearing  hernandez, <skin integrity L foot  -HR     Row Name 22 1006          Pain Scale:  FACES Pre/Post-Treatment    Pain: FACES Scale, Pretreatment 6-->hurts even more  -HR     Posttreatment Pain Rating 6-->hurts even more  -HR     Row Name 06/09/22 1006          Cognition/Psychosocial    Affect/Mental Status (Cognition) WFL  -HR     Follows Commands (Cognition) WFL  -HR     Personal Safety Interventions fall prevention program maintained;gait belt;nonskid shoes/slippers when out of bed;supervised activity  -HR     Row Name 06/09/22 1006          Mobility    Left Lower Extremity (Weight-bearing Status) non weight-bearing (NWB)  -HR     Right Lower Extremity (Weight-bearing Status) weight-bearing as tolerated (WBAT)  -HR     Row Name 06/09/22 1006          Bed Mobility    Supine-Sit [place] (Bed Mobility) contact guard;nonverbal cues (demo/gesture);verbal cues  -HR     Row Name 06/09/22 1006          Transfers    Bed-Chair [place] (Transfers) minimum assist (75% patient effort);verbal cues;nonverbal cues (demo/gesture)  -HR     Assistive Device (Bed-Chair Transfers) wheelchair;walker, front-wheeled  -HR     Sit-Stand [place] (Transfers) verbal cues;nonverbal cues (demo/gesture);contact guard  -HR     Stand-Sit [place] (Transfers) verbal cues;nonverbal cues (demo/gesture);contact guard  -HR     Row Name 06/09/22 1006          Sit-Stand Transfer    Assistive Device (Sit-Stand Transfers) walker, front-wheeled;parallel bars  -HR     Row Name 06/09/22 1006          Stand-Sit Transfer    Assistive Device (Stand-Sit Transfers) wheelchair;walker, front-wheeled;parallel bars  -HR     Row Name 06/09/22 1006          Gait/Stairs (Locomotion)    [place] Level (Gait) minimum assist (75% patient effort);2 person assist  -HR     Assistive Device (Gait) walker, front-wheeled  -HR     Distance in Feet (Gait) 20' x 3  -HR     Pattern (Gait) --  hop to  -HR     Row Name 06/09/22 1006          Balance    Comment, Balance Sitting with 2# and GTB: R Ap, B ( LAQ, march, ball sq, HS curls, hip abd,  march) L Sake board flexion. // Bars ( L Hip abd, ext, flex, HS curls), (R Squats), bean bag toss.  -HR     Row Name 06/09/22 1006          Hip (Therapeutic Exercise)    Hip Strengthening (Therapeutic Exercise) flexion;bilateral;extension;aBduction;aDduction;marching while seated;mini squats;sitting;standing;2 lb free weight;resistance band;green  L NWB maintained  -HR     Row Name 06/09/22 1006          Knee (Therapeutic Exercise)    Knee Strengthening (Therapeutic Exercise) bilateral;flexion;extension;marching while seated;marching while standing;LAQ (long arc quad);hamstring curls;sitting;standing;2 lb free weight;resistance band;green  L NWB maintained  -HR     Row Name 06/09/22 1006          Ankle (Therapeutic Exercise)    Ankle Strengthening (Therapeutic Exercise) right;dorsiflexion;plantarflexion;sitting  -HR     Row Name 06/09/22 1006          Positioning and Restraints    Pre-Treatment Position in bed  -HR     Post Treatment Position wheelchair  -HR     In Wheelchair with OT  -HR     Row Name 06/09/22 1006          Therapy Assessment/Plan (PT)    Patient's Goals For Discharge return home  -HR     Row Name 06/09/22 1006          Therapy Assessment/Plan (PT)    Rehab Potential/Prognosis (PT) adequate, monitor progress closely  -HR     Frequency of Treatment (PT) 5 times per week  -HR     Estimated Duration of Therapy (PT) 1 week;2 weeks  -HR     Problem List (PT) balance;mobility;range of motion (ROM);strength;pain  -HR     Activity Limitations Related to Problem List (PT) unable to ambulate safely;unable to transfer safely  -HR     Row Name 06/09/22 1006          Daily Progress Summary (PT)    Daily Progress Summary (PT) Pt does well on this date and walks 20' x 3 with RW min A x 2, while maintaining WB status. Exercises completed in sitting and standing until fatique.  -HR     Row Name 06/09/22 1006          Therapy Plan Review/Discharge Plan (PT)    Anticipated Equipment Needs at Discharge (PT Eval) --   tbd  -HR     Anticipated Discharge Disposition (PT) home with home health  -HR     Row Name 06/09/22 1006          IRF PT Goals    Bed Mobility Goal Selection (PT-IRF) bed mobility, PT goal 1  -HR     Transfer Goal Selection (PT-IRF) transfers, PT goal 1  -HR     Gait (Walking Locomotion) Goal Selection (PT-IRF) gait, PT goal 1  -HR     Row Name 06/09/22 1006          Bed Mobility Goal 1 (PT-IRF)    Activity/Assistive Device (Bed Mobility Goal 1, PT-IRF) sit to supine/supine to sit  -HR     [place] Level (Bed Mobility Goal 1, PT-IRF) modified independence  -HR     Time Frame (Bed Mobility Goal 1, PT-IRF) by discharge  -HR     Row Name 06/09/22 1006          Transfer Goal 1 (PT-IRF)    Activity/Assistive Device (Transfer Goal 1, PT-IRF) sit-to-stand/stand-to-sit;bed-to-chair/chair-to-bed  -HR     [place] Level (Transfer Goal 1, PT-IRF) supervision required  -HR     Time Frame (Transfer Goal 1, PT-IRF) by discharge  -HR     Row Name 06/09/22 1006          Gait/Walking Locomotion Goal 1 (PT-IRF)    Activity/Assistive Device (Gait/Walking Locomotion Goal 1, PT-IRF) walker, rolling  -HR     Gait/Walking Locomotion Distance Goal 1 (PT-IRF) 2'  -HR     [place] Level (Gait/Walking Locomotion Goal 1, PT-IRF) supervision required  -HR     Time Frame (Gait/Walking Locomotion Goal 1, PT-IRF) by discharge  -HR           User Key  (r) = Recorded By, (t) = Taken By, (c) = Cosigned By    Initials Name Provider Type    HR Hanna Murphy, LIZETT Physical Therapist Assistant              Wound 05/16/22 1340 coccyx Pressure Injury (Active)   Dressing Appearance open to air 06/08/22 2000   Closure None 06/08/22 2000   Base red;blanchable 06/08/22 2000   Periwound blanchable;redness 06/08/22 2000   Periwound Temperature warm 06/08/22 2000   Periwound Skin Turgor soft 06/08/22 2000   Edges irregular 06/08/22 2000   Drainage Amount none 06/08/22 2000   Care, Wound barrier applied 06/08/22 2000   Dressing Care open to air 06/08/22  2000   Periwound Care barrier ointment applied 06/08/22 2000       Wound 05/18/22 1545 Left other (see comments) foot Incision (Active)   Closure Staples;Sutures 06/08/22 2000   Periwound intact 06/08/22 2000   Periwound Temperature warm 06/08/22 2000   Periwound Skin Turgor firm 06/08/22 2000   Drainage Characteristics/Odor bleeding controlled 06/08/22 2000   Drainage Amount none 06/08/22 2000   Dressing Care dressing changed 06/08/22 2000       Wound 06/02/22 1240 scrotum MASD (Moisture associated skin damage) (Active)   Dressing Appearance open to air 06/08/22 2000   Closure Open to air 06/08/22 2000   Base red;moist 06/08/22 2000     Physical Therapy Education                 Title: PT OT SLP Therapies (Done)     Topic: Physical Therapy (Done)     Point: Mobility training (Done)     Learning Progress Summary           Patient Acceptance, E,TB, VU,DU by HR at 6/9/2022 1013      Show all documentation for this point (8)                 Point: Home exercise program (Done)     Learning Progress Summary           Patient Acceptance, E,TB, VU,DU by HR at 6/9/2022 1013      Show all documentation for this point (8)                 Point: Body mechanics (Done)     Learning Progress Summary           Patient Acceptance, E,TB, VU,DU by HR at 6/9/2022 1013      Show all documentation for this point (8)                 Point: Precautions (Done)     Learning Progress Summary           Patient Acceptance, E,TB, VU,DU by HR at 6/9/2022 1013      Show all documentation for this point (8)                             User Key     Initials Effective Dates Name Provider Type Discipline    HR 01/14/22 -  Hanna Murphy PTA Physical Therapist Assistant PT                PT Recommendation and Plan    Frequency of Treatment (PT): 5 times per week  Anticipated Equipment Needs at Discharge (PT Eval):  (tbd)  Daily Progress Summary (PT)  Daily Progress Summary (PT): Pt does well on this date and walks 20' x 3 with RW min A x 2, while  maintaining WB status. Exercises completed in sitting and standing until fatique.               Time Calculation:      PT Charges     Row Name 06/09/22 1013             Time Calculation    Start Time 0730  -HR      Stop Time 0915  -HR      Time Calculation (min) 105 min  -HR      PT Received On 06/09/22  -HR              Time Calculation- PT    Total Timed Code Minutes-  minute(s)  -HR            User Key  (r) = Recorded By, (t) = Taken By, (c) = Cosigned By    Initials Name Provider Type    HR Hanna Murphy PTA Physical Therapist Assistant                Therapy Charges for Today     Code Description Service Date Service Provider Modifiers Qty    82451214443 HC GAIT TRAINING EA 15 MIN 6/9/2022 Hanna Murphy PTA GP, CQ 2    36932217398 HC PT THER PROC EA 15 MIN 6/9/2022 Hanna Murphy PTA GP, CQ 3    70059677739 HC PT THERAPEUTIC ACT EA 15 MIN 6/9/2022 Hanna Murphy PTA GP, CQ 2                   Hanna Murphy PTA  6/9/2022

## 2022-06-09 NOTE — PLAN OF CARE
Goal Outcome Evaluation:      Patient resting with no complaints at present. Will continue to monitor.

## 2022-06-09 NOTE — PROGRESS NOTES
Occupational Therapy: Individual: 90 minutes.    Physical Therapy:    Speech Language Pathology:    Signed by: Jaqueline Bautista OT

## 2022-06-09 NOTE — PROGRESS NOTES
[facility]  PROGRESS NOTE     Patient Identification:  Name:  Melchor Hardwick  Age:  57 y.o.  Sex:  male  :  1965  MRN:  [id number]  Visit Number:  [id number]  ROOM: 109/2S     Primary Care Provider:  Missy Up APRN    Length of stay in inpatient status:  7    Subjective     Chief Compliant:  Critical illness myopathy    History of Presenting Illness: 57-year-old gentleman being treated for critical illness myopathy.  Patient is status post left midfoot amputation secondary to osteomyelitis with staphylococcal aureus bacteremia.  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      Patient is awake alert, seen while participating with physical therapy, participating without any difficulty.  Denies any complaints this morning.  Labs reviewed, sodium is 133.  Creatinine stable.  Blood glucose 85 this morning.  CRP 3.9.  Platelet count has dropped from 117-88,000.  Vital signs stable on room air.  No acute distress.    Objective     Current Hospital Meds:aspirin, 81 mg, Oral, Daily  atorvastatin, 40 mg, Oral, Nightly  furosemide, 40 mg, Oral, BID  Insulin Aspart, 0-7 Units, Subcutaneous, TID AC  Insulin Aspart, 15 Units, Subcutaneous, TID AC  insulin detemir, 20 Units, Subcutaneous, Q12H  metFORMIN, 500 mg, Oral, BID With Meals  multivitamin, 1 tablet, Oral, Daily  nadolol, 20 mg, Oral, Q24H  nystatin, , Topical, Q12H  pantoprazole, 40 mg, Oral, Q AM  potassium chloride, 40 mEq, Oral, Q4H  senna-docusate sodium, 2 tablet, Oral, BID  sodium chloride, 10 mL, Intravenous, Q12H  sodium chloride, 10 mL, Intravenous, Q12H  spironolactone, 50 mg, Oral, Daily  vancomycin, 1,750 mg, Intravenous, Q12H       ----------------------------------------------------------------------------------------------------------------------  Vital Signs:  Temp:  [98 °F (36.7 °C)] 98 °F (36.7 °C)  Heart Rate:   [81] 81  Resp:  [20] 20  BP: (104)/(56) 104/56  SpO2:  [96 %] 96 %  on   ;   Device (Oxygen Therapy): room air  Body mass index is 37.33 kg/m².    Wt Readings from Last 3 Encounters:   06/08/22 118 kg (260 lb 2.3 oz)   06/02/22 117 kg (258 lb)     Intake & Output (last 3 days)       06/06 0701 06/07 0700 06/07 0701 06/08 0700 06/08 0701 06/09 0700 06/09 0701  06/10 0700    P.O. 1440 1920 2040 240    IV Piggyback 500 1000 500     Total Intake(mL/kg) 1940 (16.4) 2920 (24.7) 2540 (21.5) 240 (2)    Urine (mL/kg/hr) 5100 (1.8) 5550 (2) 4525 (1.6)     Stool  0 0     Total Output 5100 5550 4525     Net -3160 -2690 -1985 +240            Stool Unmeasured Occurrence  3 x 1 x         Diet Regular; Cardiac, Consistent Carbohydrate, Daily Fluid Restriction; 1500 mL Fluid Per Day; High Protein  ----------------------------------------------------------------------------------------------------------------------  Physical exam:  Constitutional:   Seen while working with physical therapy, working with therapy without any difficulty.  No distress noted.  No complaints reported.  HEENT: Normocephalic atraumatic  Neck:    Supple  Cardiovascular: Regular rate and rhythm  Pulmonary/Chest: Clear to auscultation  Abdominal: Positive bowel sounds.  Abdomen rounded, soft.  Musculoskeletal: Status post left forefoot amputation--wound appears to be healing well with no surrounding erythema or drainage  Neurological: Sensory change stocking glove distribution  Skin: No rash  Peripheral vascular: Trace dependent edema in bilateral lower extremities but improved.  Genitourinary: Scrotal edema improved  ----------------------------------------------------------------------------------------------------------------------    Last echocardiogram:  Results for orders placed during the hospital encounter of 05/10/22    Adult Transthoracic Echo Complete W/ Cont if Necessary Per Protocol    Interpretation Summary  · Normal left ventricular cavity  size and wall thickness noted. All left ventricular wall segments contract normally.  · Left ventricular ejection fraction appears to be 61 - 65%.  · The mitral valve is structurally normal with no significant stenosis present. Trace mitral valve regurgitation is present.  · The aortic valve is structurally normal with no regurgitation or stenosis present.  · There is no evidence of pericardial effusion.    ----------------------------------------------------------------------------------------------------------------------  Results from last 7 days   Lab Units 06/09/22  0142 06/06/22  0040 06/04/22  0030   CRP mg/dL 3.19*  --   --    WBC 10*3/mm3 3.93 4.84 5.58   HEMOGLOBIN g/dL 8.8* 9.4* 10.1*   HEMATOCRIT % 28.1* 29.7* 31.8*   MCV fL 79.4 79.4 79.1   MCHC g/dL 31.3* 31.6 31.8   PLATELETS 10*3/mm3 88* 117* 140         Results from last 7 days   Lab Units 06/09/22  0142 06/06/22  1710 06/06/22  0040 06/04/22  1829 06/04/22  0030   SODIUM mmol/L 133*  --  137  --  134*   POTASSIUM mmol/L 3.5 4.0 3.6   < > 3.3*   CHLORIDE mmol/L 99  --  105  --  104   CO2 mmol/L 25.0  --  22.6  --  23.1   BUN mg/dL 17  --  18  --  21*   CREATININE mg/dL 0.70*  --  0.73*  --  0.73*   CALCIUM mg/dL 7.8*  --  7.9*  --  8.1*   GLUCOSE mg/dL 159*  --  84  --  86   ALBUMIN g/dL 1.87*  --   --   --  1.89*   BILIRUBIN mg/dL 0.4  --   --   --  0.4   ALK PHOS U/L 275*  --   --   --  293*   AST (SGOT) U/L 74*  --   --   --  47*   ALT (SGPT) U/L 48*  --   --   --  37    < > = values in this interval not displayed.   Estimated Creatinine Clearance: 149.9 mL/min (A) (by C-G formula based on SCr of 0.7 mg/dL (L)).  No results found for: AMMONIA              Glucose   Date/Time Value Ref Range Status   06/09/2022 1125 93 70 - 130 mg/dL Final     Comment:     Meter: [id number] : [id number] leonie duvald   06/09/2022 0649 85 70 - 130 mg/dL Final     Comment:     Meter: [id number] : [id number] YAMILET NATAN   06/08/2022 1801 126 70 - 130  mg/dL Final     Comment:     Meter: [id number] : [id number] Penelope Dawson   06/08/2022 1631 69 (L) 70 - 130 mg/dL Final     Comment:     Meter: [id number] : [id number] Penelope Dawson   06/08/2022 1122 178 (H) 70 - 130 mg/dL Final     Comment:     RN Notified Meter: [id number] : [id number] DAYNE VILLANUEVA   06/08/2022 0610 77 70 - 130 mg/dL Final     Comment:     Meter: [id number] : [id number] Andover Crystal   06/07/2022 1636 71 70 - 130 mg/dL Final     Comment:     Meter: [id number] : [id number] Penelope Dawson   06/07/2022 1059 248 (H) 70 - 130 mg/dL Final     Comment:     Meter: [id number] : [id number] Ritu Frederick     Lab Results   Component Value Date    TSH 4.700 (H) 05/30/2022    FREET4 0.87 (L) 05/30/2022     No results found for: PREGTESTUR, PREGSERUM, HCG, HCGQUANT  Pain Management Panel     Pain Management Panel Latest Ref Rng & Units 5/24/2022 5/11/2022    CREATININE UR mg/dL 91.0 -    AMPHETAMINES SCREEN, URINE Negative - Negative    BARBITURATES SCREEN Negative - Negative    BENZODIAZEPINE SCREEN, URINE Negative - Negative    BUPRENORPHINEUR Negative - Negative    COCAINE SCREEN, URINE Negative - Negative    METHADONE SCREEN, URINE Negative - Negative    METHAMPHETAMINEUR Negative - Negative        Brief Urine Lab Results  (Last result in the past 365 days)      Color   Clarity   Blood   Leuk Est   Nitrite   Protein   CREAT   Urine HCG        05/24/22 1839             91.0             No results found for: BLOODCX      No results found for: URINECX  No results found for: WOUNDCX  No results found for: STOOLCX  Results from last 7 days   Lab Units 06/09/22  0142   CRP mg/dL 3.19*       I have personally looked at the labs and they are summarized above.  ----------------------------------------------------------------------------------------------------------------------  Detailed radiology reports for the last 24 hours:    Imaging Results (Last 24 Hours)     ** No results found  for the last 24 hours. **        Final impressions for the last 30 days of radiology reports:    CT Abdomen Pelvis Without Contrast    Result Date: 5/25/2022  1.  Advanced liver cirrhosis with changes of portal hypertension which include spinal megaly, prominent portosystemic collateral vessels, small-moderate volume ascites, and marked anasarca. 2.  Moderate constipation and mild generalized ileus. No bowel obstruction. 3.  Miniscule pleural effusions. 4.  Other incidental and nonacute findings detailed above.  This report was finalized on 5/25/2022 8:48 AM by Dr. Brain Rodriguez MD.      XR Foot 3+ View Left    Result Date: 5/10/2022    Degenerative change of the midfoot.  This report was finalized on 5/10/2022 4:17 PM by Dr. Oswaldo Brown MD.      US Abdomen Complete    Result Date: 5/20/2022  1. Splenomegaly. 2. Small volume ascites.  This report was finalized on 5/20/2022 12:59 PM by Dr. Lobo Ring MD.      US Scrotum & Testicles    Result Date: 5/20/2022  Extensive scrotal wall swelling.   This report was finalized on 5/20/2022 12:59 PM by Dr. Lobo Ring MD.      US Scrotum & Testicles    Result Date: 5/16/2022  Scrotal wall thickening  This report was finalized on 5/16/2022 4:57 PM by Dr. Lobo Ring MD.      US Arterial Doppler Lower Extremity Left    Result Date: 5/10/2022  Abnormal examination demonstrating elevated flow velocities throughout the left lower extremity arterial system and biphasic flow within the left posterior tibial artery. Contemporaneously performed CT demonstrates imaging findings highly suspicious for gas gangrene and infection/osteomyelitis. CT dictated separately. Signer Name: Oscar Dixon MD  Signed: 5/10/2022 7:16 PM  Workstation Name: [facility]  [facility]    MRI Foot Left Without Contrast    Result Date: 5/16/2022  Appearance concerning for osteomyelitis involving the 3rd metatarsal and likely the adjacent cuneiform.  This report was finalized  on 5/16/2022 3:10 PM by Dr. Lobo Ring MD.      US Venous Doppler Lower Extremity Bilateral (duplex)    Result Date: 5/24/2022  No sonographic findings of DVT in the lower extremities.  This report was finalized on 5/24/2022 8:05 AM by Dr. Garry Lovelace II, MD.      CT Lower Extremity Left With Contrast    Result Date: 5/10/2022  Marked soft tissue edema and fat stranding at the level of the foot, diffuse with scattered foci of air both within soft tissues and bony structures. Findings are concerning for gas gangrene and osteomyelitis. Both the midfoot and forefoot are involved. Signer Name: ANTELMO COFFMAN MD  Signed: 5/10/2022 6:14 PM  [facility]: [facility]  [facility]    XR Foreign Body For MRI    Result Date: 5/16/2022  No radiopaque foreign body identified  This report was finalized on 5/16/2022 10:20 AM by Dr. Lobo Ring MD.      I have personally looked at the radiology images and read the final radiology report.    Assessment & Plan    Critical illness myopathy--participated with physical and Occupational Therapy on 6/8/2022: Performs transfer activities with minimal to moderate assist and verbal/nonverbal cues; utilizes wheelchair/front wheeled walker for chair to bed, sit/stand/stand to sit transfer; ambulated 8 feet in parallel bars and 20 feet with rolling walker with minimal to moderate assist; participated with gross motor coordination activities; therapeutic exercise/ROM    Status post left foot forefoot amputation-- Dr. Colyb is unavailable until next Monday.  Continue IV antibiotic therapy.  CRP 3.19.    Staphylococcal aureus bacteremia--continue vancomycin through June 23    Generalized edema with scrotal edema-- continue Lasix 40 mg twice daily.  Scrotal edema improved    Cirrhosis secondary to [place]-- nadolol.  Likely benefit from outpatient GI evaluation May need screening for esophageal varices    Diabetes mellitus--blood glucose 85 this morning.  Levemir  20  units twice daily    Anemia-- stable.  CBC in a.m.    Thrombocytopenia--platelets decreased at 88,000, hematology consulted to evaluate.  Lovenox held.  SCD ordered to right leg for DVT prophylaxis    Hypertension-- continue current treatment    Obesity with BMI of 37.39 kg per metered square    Hyperlipidemia-- continue statin therapy      VTE Prophylaxis:   Mechanical Order History:      Ordered        06/02/22 1707  Maintain Sequential Compression Device  Continuous                    Pharmalogical Order History:      Ordered     Dose Route Frequency Stop    06/02/22 1707  Enoxaparin Sodium (LOVENOX) syringe 40 mg         40 mg SC Nightly --                Scribed for Marilynn Giordano MD by ROSALINA Friend. 6/9/2022  12:02 EDT       ROSALINA Friend  06/09/22  12:02 EDT       This was an audio and video enabled telemedicine encounter. I was unable to see the patient face to face as I was in COVID quarantine. The entire visit was performed on audio and video while the APRN performed the physical exam.      Marilynn Giordano MD  06/10/22  08:57 EDT

## 2022-06-09 NOTE — PROGRESS NOTES
Case Management  Inpatient Rehabilitation Team Conference    Conference Date/Time: 6/7/2022 6:35:30 AM    Team Conference Attendees:  MD Ave Morales SW Jessica Bill, RN,   JD Wilkerson, PT  Mandi Smith OT    Demographics            Age: 57Y            Gender: Male    Admission Date: 6/2/2022 4:40:00 PM  Rehabilitation Diagnosis:  Debility due to Left midfoot amputation secondary to  gas gangrene and osteomyelitis.  Comorbidities:      Plan of Care  Anticipated Discharge Date/Estimated Length of Stay: 14 days  Anticipated Discharge Destination: Community discharge with assistance  Discharge Plan : Pt plans to go home with sister Ros at discharge.  Medical Necessity Expected Level Rationale: Good  Intensity and Duration: an average of 3 hours/5 days per week  Medical Supervision and 24 Hour Rehab Nursing: x  Physical Therapy: x  PT Intensity/Duration: 1.5 hrs/day, 5-6 days/week  Occupational Therapy: x  OT Intensity/Duration: 1.5 hrs/day, 5-6 days/week  Social Work: x  Therapeutic Recreation: x  Updated (if changes indicated)    Anticipated Discharge Date/Estimated Length of Stay:   6-16-22      Discharge Plan of Care:    Based on the patient's medical and functional status, their prognosis and  expected level of functional improvement is: Good      Interdisciplinary Problem/Goals/Status  Body Function Structure    [RN] Skin Integrity(Active)  Current Status(06/02/2022): impaired skin integrity  Weekly Goal(06/24/2022): improved skin integrity  Discharge Goal: wound healing        Mobility    [PT] Bed/Chair/Wheelchair(Active)  Current Status(06/03/2022): min A  Weekly Goal(06/10/2022): Sup  Discharge Goal: Sup    [PT] Walk(Active)  Current Status(06/03/2022): unable  Weekly Goal(06/10/2022): amb 2' RW Sup  Discharge Goal: amb 2' RW Sup        Pain    [RN] Pain Management(Active)  Current Status(06/02/2022): potential for increased pain  Weekly  Goal(06/24/2022): pain at a tolerable level  Discharge Goal: no pain        Safety    [RN] Potential for Injury(Active)  Current Status(06/02/2022): potential for falls  Weekly Goal(06/24/2022): no falls  Discharge Goal: no falls        Self Care    [OT] Dressing (Lower)(Active)  Current Status(06/06/2022): Mod A  Weekly Goal(06/14/2022): Min A  Discharge Goal: Min A    Comments: Pt plans to go home with sister at discharge.    Signed by: KAREN Calderon    Physician CoSigned By: Marilynn Giordano 06/09/2022 08:03:14

## 2022-06-09 NOTE — PROGRESS NOTES
Occupational Therapy:    Physical Therapy: Individual: 105 minutes.    Speech Language Pathology:    Signed by: Hanna Murphy PTA

## 2022-06-09 NOTE — THERAPY TREATMENT NOTE
Inpatient Rehabilitation - Occupational Therapy Treatment Note     [place]     Patient Name: Melchor Hardwick  : 1965  MRN: [id number]    Today's Date: 2022                 Admit Date: 2022       No diagnosis found.    Patient Active Problem List   Diagnosis   • Hyperlipidemia   • Hypertensive disorder   • Kidney disease   • Obesity   • Type 2 diabetes mellitus (HCC)   • Gas gangrene of foot (HCC)   • Cellulitis in diabetic foot (HCC)   • Other acute osteomyelitis of left foot (HCC)   • Osteomyelitis (HCC)   • Critical illness myopathy       Past Medical History:   Diagnosis Date   • Diabetes mellitus (HCC)    • Elevated cholesterol    • Hyperlipidemia    • Hypertension        Past Surgical History:   Procedure Laterality Date   • ABSCESS DRAINAGE      PERINEUM   • AMPUTATION FOOT Left 2022    Procedure: AMPUTATION FOOT;  Surgeon: Addison Colby MD;  Location: [facility];  Service: Podiatry;  Laterality: Left;   • INCISION AND DRAINAGE LEG Left 05/10/2022    Procedure: INCISION AND DRAINAGE LOWER EXTREMITY;  Surgeon: Addison Colby MD;  Location: [facility];  Service: Podiatry;  Laterality: Left;   • WOUND DEBRIDEMENT Left 2022    Procedure: DEBRIDEMENT FOOT;  Surgeon: Addison Colby MD;  Location: [facility];  Service: Podiatry;  Laterality: Left;             IRF OT ASSESSMENT FLOWSHEET (last 12 hours)     IRF OT Evaluation and Treatment     Row Name 22 1457          OT Time and Intention    Document Type daily treatment  -LA     Mode of Treatment individual therapy;occupational therapy  -LA     Patient Effort good  -LA     Row Name 22 1456          General Information    Patient Profile Reviewed yes  -LA     General Observations of Patient Patient pleasant and cooperative. Patient agreeable to therapy with good motivation to be as independent as possible.  -LA     Existing Precautions/Restrictions fall;non-weight bearing;left  -LA     Row Name 22 1450           Cognition/Psychosocial    Affect/Mental Status (Cognition) [facility]  -LA     Orientation Status (Cognition) oriented x 4  -LA     Follows Commands (Cognition) WFL  -LA     Personal Safety Interventions fall prevention program maintained;gait belt  -LA     Row Name 06/09/22 1457          Bed Mobility    Supine-Sit [place] (Bed Mobility) contact guard;verbal cues;nonverbal cues (demo/gesture)  -LA     Row Name 06/09/22 1457          Transfers    Bed-Chair [place] (Transfers) minimum assist (75% patient effort);verbal cues;nonverbal cues (demo/gesture)  -LA     Assistive Device (Bed-Chair Transfers) wheelchair  -LA     Sit-Stand [place] (Transfers) contact guard  Patient uses bed rail to assist with sit to stand  -LA     Row Name 06/09/22 1457          Motor Skills    Motor Skills coordination;functional endurance;motor control/coordination interventions  -LA     Functional Endurance Fair+  -LA     Motor Control/Coordination Interventions fine motor manipulation/dexterity activities;gross motor coordination activities;occupation/activity based treatment;therapeutic exercise/ROM  -LA     Therapeutic Exercise shoulder;elbow/forearm;wrist;hand  Strengthening to promote increased ease with transfers and improve ADL dressing/bathing independence. Improved biceps/deltoid strength noted.  -LA     Row Name 06/09/22 1457          Positioning and Restraints    Post Treatment Position bed  -LA     In Bed supine;sitting EOB;call light within reach;encouraged to call for assist  -LA     Row Name 06/09/22 1457          Therapy Assessment/Plan (OT)    Planned Therapy Interventions (OT) activity tolerance training;adaptive equipment training;occupation/activity based interventions;patient/caregiver education/training;ROM/therapeutic exercise;strengthening exercise;transfer/mobility retraining  -LA     Row Name 06/09/22 1457          Daily Progress Summary (OT)    Daily Progress Summary (OT) Patient with good steady progress  toward goals.  -LA           User Key  (r) = Recorded By, (t) = Taken By, (c) = Cosigned By    Initials Name Effective Dates    Jaqueline Mo OT 02/14/22 -                  Occupational Therapy Education                 Title: PT OT SLP Therapies (Done)     Topic: Occupational Therapy (Done)     Point: ADL training (Done)     Description:   Instruct learner(s) on proper safety adaptation and remediation techniques during self care or transfers.   Instruct in proper use of assistive devices.              Learning Progress Summary           Patient Acceptance, E,D,TB, VU,DU,NR by LA at 6/9/2022 1503      Show all documentation for this point (6)                 Point: Precautions (Done)     Description:   Instruct learner(s) on prescribed precautions during self-care and functional transfers.              Learning Progress Summary           Patient Acceptance, E,D,TB, VU,DU,NR by LA at 6/9/2022 1503      Show all documentation for this point (6)                             User Key     Initials Effective Dates Name Provider Type Discipline    LA 02/14/22 -  Jaqueline Bautista OT Occupational Therapist OT                    OT Recommendation and Plan    Planned Therapy Interventions (OT): activity tolerance training, adaptive equipment training, occupation/activity based interventions, patient/caregiver education/training, ROM/therapeutic exercise, strengthening exercise, transfer/mobility retraining       Daily Progress Summary (OT)  Daily Progress Summary (OT): Patient with good steady progress toward goals.            Time Calculation:      Time Calculation- OT     Row Name 06/09/22 1504 06/09/22 1503          Time Calculation- OT    OT Start Time 1330  -LA 0915  -LA     OT Stop Time 1415  -LA 1000  -LA     OT Time Calculation (min) 45 min  -LA 45 min  -LA     Total Timed Code Minutes- OT 45 minute(s)  -LA 45 minute(s)  -LA           User Key  (r) = Recorded By, (t) = Taken By, (c) = Cosigned By    Initials Name  Provider Type    Jaqueline Mo OT Occupational Therapist              Therapy Charges for Today     Code Description Service Date Service Provider Modifiers Qty    19458379817  OT THERAPEUTIC ACT EA 15 MIN 6/9/2022 Jaqueline Bautista OT GO 3    [id number]  OT THER PROC EA 15 MIN 6/9/2022 Jaqueline Bautista OT GO 3                   Jaqueline Bautista OT  6/9/2022

## 2022-06-09 NOTE — CONSULTS
Melchor Hardwick  [id number]  1965  6/9/2022    Referring Provider:   Dr. ZEYAD Giordano    Reason for Consultation:   Thrombocytopenia      Patient Care Team:  Missy Up APRN as PCP - General (Nurse Practitioner)    Chief Complaint:  Thrombocytopenia    History of Present Illness:    Melchor Hardwick is a 57 y.o. male seen today at the request of Dr. Giordano for evaluation and treatment of thrombocytopenia.     Patient was admitted to [facility] for sepsis secondary to left foot osteomyelitis. He underwent I&D of abscess and debridement by podiatry and both wound and blood cultures were positive for MRSA. Unfortunately he required transmetatarsal amputation. During his hospital stay he was also diagnosed PAZ cirrhosis and experienced severe scrotal edema that has required hernandez catheter. He is currently admitted to inpatient rehabilitation for debilitation and myopathy and remains on IV vancomycin which he will continue through 6/23/22. Currently being consulted for thrombocytopenia. He is unaware of any previous history significant for low counts. He denies of any bleeding. During his admission his platelet count has fluctuated from 88 to 140 thousand with today being the lowest at 88 thousand.        Past Medical History:  Past Medical History:   Diagnosis Date   • Diabetes mellitus (HCC)    • Elevated cholesterol    • Hyperlipidemia    • Hypertension        Past Surgical History:  Past Surgical History:   Procedure Laterality Date   • ABSCESS DRAINAGE      PERINEUM   • AMPUTATION FOOT Left 5/18/2022    Procedure: AMPUTATION FOOT;  Surgeon: Addison Colby MD;  Location: [facility];  Service: Podiatry;  Laterality: Left;   • INCISION AND DRAINAGE LEG Left 05/10/2022    Procedure: INCISION AND DRAINAGE LOWER EXTREMITY;  Surgeon: Addison Colby MD;  Location: [facility];  Service: Podiatry;  Laterality: Left;   • WOUND DEBRIDEMENT Left 05/13/2022    Procedure: DEBRIDEMENT FOOT;  Surgeon: Addison Colby MD;  Location:   [facility];  Service: Podiatry;  Laterality: Left;           Family History:  Mother - metastatic colon cancer at 55        Social History:  Social History     Tobacco Use   • Smoking status: Never Smoker   • Smokeless tobacco: Never Used   Substance Use Topics   • Alcohol use: Never   • Drug use: Never   Denies of any alcohol use. Works in construction and also own cattle. He lives by himself. Does not have children.         Allergies:    Patient has no known allergies.        Outpatient Medications:  Medications Prior to Admission   Medication Sig Dispense Refill Last Dose   • glimepiride (AMARYL) 4 MG tablet Take 4 mg by mouth 2 (Two) Times a Day.   Unknown at Unknown time   • hydroCHLOROthiazide (HYDRODIURIL) 12.5 MG tablet Take 12.5 mg by mouth Daily As Needed (edema).   Unknown at Unknown time   • insulin NPH (humuLIN N,novoLIN N) 100 UNIT/ML injection Inject 30 Units under the skin into the appropriate area as directed 2 (Two) Times a Day Before Meals.   Unknown at Unknown time   • losartan (COZAAR) 100 MG tablet Take 1 tablet by mouth Daily.   Unknown at Unknown time   • lovastatin (MEVACOR) 10 MG tablet Take 10 mg by mouth Every Night.   Unknown at Unknown time   • metFORMIN (GLUCOPHAGE) 1000 MG tablet Take 1,000 mg by mouth 2 (Two) Times a Day With Meals.   Unknown at Unknown time       Inpatient Medications:  Scheduled Meds:  aspirin, 81 mg, Oral, Daily  atorvastatin, 40 mg, Oral, Nightly  furosemide, 40 mg, Oral, BID  Insulin Aspart, 0-7 Units, Subcutaneous, TID AC  Insulin Aspart, 15 Units, Subcutaneous, TID AC  insulin detemir, 20 Units, Subcutaneous, Q12H  metFORMIN, 500 mg, Oral, BID With Meals  multivitamin, 1 tablet, Oral, Daily  nadolol, 20 mg, Oral, Q24H  nystatin, , Topical, Q12H  pantoprazole, 40 mg, Oral, Q AM  senna-docusate sodium, 2 tablet, Oral, BID  sodium chloride, 10 mL, Intravenous, Q12H  sodium chloride, 10 mL, Intravenous, Q12H  spironolactone, 50 mg, Oral, Daily  vancomycin, 1,750 mg,  Intravenous, Q12H        Continuous Infusions:       PRN Meds:  senna-docusate sodium **AND** polyethylene glycol **AND** bisacodyl **AND** bisacodyl  •  dextrose  •  dextrose  •  glucagon (human recombinant)  •  HYDROcodone-acetaminophen  •  magic barrier cream  •  melatonin  •  potassium chloride **OR** potassium chloride **OR** potassium chloride  •  sodium chloride  •  sodium chloride  •  sodium chloride      Review of Systems:  A comprehensive 14 point review of systems was conducted with patient and positive as per HPI otherwise negative.        Physical Exam:  Vital Signs: These were reviewed and listed as per patient’s electronic medical chart  Vitals:    06/08/22 1903   BP: 104/56   Pulse: 81   Resp: 20   Temp: 98 °F (36.7 °C)   SpO2: 96%     General: Awake, alert and oriented, in no distress, obese  HEENT: Head is atraumatic, normocephalic, extraocular movements full, oropharynx clear, no scleral icterus  Neck: supple, no jvd, lymphadenopathy or masses  Cardiovascular: regular rate and rhythm without murmurs, rubs or gallops  Pulmonary: non-labored, clear to auscultation bilaterally, no wheezing  Abdomen: soft, non-tender, non-distended, normal active bowel sounds present, no organomegaly  Extremities: No clubbing, cyanosis, positive edema  Lymph: No cervical, supraclavicular adenopathy  Neurologic: Mental status as above, alert, awake and oriented, grossly non-focal exam  Skin: warm, dry, intact  MSK: left lower extremity in wrap            Labs / Studies:  Lab Results (last 72 hours)     Procedure Component Value Units Date/Time    POC Glucose Once [[id number]]  (Normal) Collected: 06/09/22 1125    Specimen: Blood Updated: 06/09/22 1132     Glucose 93 mg/dL      Comment: Meter: [id number] : [id number] crystal lino       Heparin-induced Platelet Antibody [[id number]] Collected: 06/09/22 1004    Specimen: Blood Updated: 06/09/22 1047    POC Glucose Once [[id number]]  (Normal) Collected: 06/09/22 0649     Specimen: Blood Updated: 06/09/22 0655     Glucose 85 mg/dL      Comment: Meter: [id number] : [id number] YAMILET GAMA       Comprehensive Metabolic Panel [[id number]]  (Abnormal) Collected: 06/09/22 0142    Specimen: Blood Updated: 06/09/22 0237     Glucose 159 mg/dL      BUN 17 mg/dL      Creatinine 0.70 mg/dL      Sodium 133 mmol/L      Potassium 3.5 mmol/L      Chloride 99 mmol/L      CO2 25.0 mmol/L      Calcium 7.8 mg/dL      Total Protein 5.2 g/dL      Albumin 1.87 g/dL      ALT (SGPT) 48 U/L      AST (SGOT) 74 U/L      Alkaline Phosphatase 275 U/L      Total Bilirubin 0.4 mg/dL      Globulin 3.3 gm/dL      A/G Ratio 0.6 g/dL      BUN/Creatinine Ratio 24.3     Anion Gap 9.0 mmol/L      eGFR 107.5 mL/min/1.73      Comment: National Kidney Foundation and American Society of Nephrology (ASN) Task Force recommended calculation based on the Chronic Kidney Disease Epidemiology Collaboration (CKD-EPI) equation refit without adjustment for race.       Narrative:      GFR Normal >60  Chronic Kidney Disease <60  Kidney Failure <15      C-reactive Protein [[id number]]  (Abnormal) Collected: 06/09/22 0142    Specimen: Blood Updated: 06/09/22 0237     C-Reactive Protein 3.19 mg/dL     CBC & Differential [[id number]]  (Abnormal) Collected: 06/09/22 0142    Specimen: Blood Updated: 06/09/22 0206    Narrative:      The following orders were created for panel order CBC & Differential.  Procedure                               Abnormality         Status                     ---------                               -----------         ------                     CBC Auto Differential[[id number]]        Abnormal            Final result                 Please view results for these tests on the individual orders.    CBC Auto Differential [[id number]]  (Abnormal) Collected: 06/09/22 0142    Specimen: Blood Updated: 06/09/22 0206     WBC 3.93 10*3/mm3      RBC 3.54 10*6/mm3      Hemoglobin 8.8 g/dL      Hematocrit 28.1 %      MCV  79.4 fL      MCH 24.9 pg      MCHC 31.3 g/dL      RDW 17.2 %      RDW-SD 49.9 fl      MPV 10.3 fL      Platelets 88 10*3/mm3      Neutrophil % 43.9 %      Lymphocyte % 42.0 %      Monocyte % 10.4 %      Eosinophil % 3.1 %      Basophil % 0.3 %      Immature Grans % 0.3 %      Neutrophils, Absolute 1.73 10*3/mm3      Lymphocytes, Absolute 1.65 10*3/mm3      Monocytes, Absolute 0.41 10*3/mm3      Eosinophils, Absolute 0.12 10*3/mm3      Basophils, Absolute 0.01 10*3/mm3      Immature Grans, Absolute 0.01 10*3/mm3      nRBC 0.0 /100 WBC     POC Glucose Once [[id number]]  (Normal) Collected: 06/08/22 1801    Specimen: Blood Updated: 06/08/22 1808     Glucose 126 mg/dL      Comment: Meter: [id number] : [id number] Negrete Eunice       POC Glucose Once [[id number]]  (Abnormal) Collected: 06/08/22 1631    Specimen: Blood Updated: 06/08/22 1703     Glucose 69 mg/dL      Comment: Meter: [id number] : [id number] Negrete Eunice       POC Glucose Once [[id number]]  (Abnormal) Collected: 06/08/22 1122    Specimen: Blood Updated: 06/08/22 1134     Glucose 178 mg/dL      Comment: RN Notified Meter: [id number] : [id number] DAYNE VILLANUEVA       POC Glucose Once [[id number]]  (Normal) Collected: 06/08/22 0610    Specimen: Blood Updated: 06/08/22 0617     Glucose 77 mg/dL      Comment: Meter: [id number] : [id number] Tracey Crystal       POC Glucose Once [[id number]]  (Normal) Collected: 06/07/22 1636    Specimen: Blood Updated: 06/07/22 1655     Glucose 71 mg/dL      Comment: Meter: [id number] : [id number] Negrete Eunice       POC Glucose Once [[id number]]  (Abnormal) Collected: 06/07/22 1059    Specimen: Blood Updated: 06/07/22 1105     Glucose 248 mg/dL      Comment: Meter: [id number] : [id number] Ritu Frederick       POC Glucose Once [[id number]]  (Abnormal) Collected: 06/07/22 0907    Specimen: Blood Updated: 06/07/22 0914     Glucose 250 mg/dL      Comment: Meter: [id number] : [id number] Steve Yancey        POC Glucose Once [[id number]]  (Normal) Collected: 06/07/22 0604    Specimen: Blood Updated: 06/07/22 0625     Glucose 112 mg/dL      Comment: Meter: [id number] : [id number] [place] Crystal       Potassium [[id number]]  (Normal) Collected: 06/06/22 1710    Specimen: Blood Updated: 06/06/22 1739     Potassium 4.0 mmol/L     POC Glucose Once [[id number]]  (Normal) Collected: 06/06/22 1636    Specimen: Blood Updated: 06/06/22 1643     Glucose 127 mg/dL      Comment: Meter: [id number] : [id number] lisa ashley               Imaging Results (Last 72 Hours)     ** No results found for the last 72 hours. **                Assessment & Plan:  Melchor Hardwick is a 57 y.o. male seen today at the request of Dr. Giordano for evaluation and treatment of thrombocytopenia.     Thrombocytopenia  Normocytic anemia  - Patient's platelet count has fluctuated during admission and today was found to be the lowest at 88 thousand.  - Will assess for nutritional deficiencies that may be contributing such as B12, folate, and iron studies which are pending.   - HIV and acute hepatitis panel are nonreactive.    - Peripheral smear, fibrinogen, coagulation markers are pending.   - Imaging consistent for cirrhosis with splenomegaly, changes of portal hypertension, ascites, and anasarca.   - I suspect thrombocytopenia is likely multifactorial and secondary to MRSA bacteremia/osteomyelitis (although improved patient has elevated CRP indicating underlying inflammation that can cause bone marrow suppression) versus medications versus underlying cirrhosis and resulting splenomegaly. Liver disease can impair platelet production, from decreased hepatic synthesis of thrombopoietin and there can also be increased platelet sequestration in the spleen, in the setting of portal hypertension and hypersplenism.    PAZ cirrhosis  - Will need hepatology follow up for consideration of endoscopic procedures to assess for varices as well as will need colonoscopy.  Patient has never had a colonoscopy and his mother was diagnosed with metastatic colon cancer at 55.     Thank you for consultation will follow along with you. Patients questions were answered to his satisfaction.       Sushila Tadeo MD  06/09/22  12:23 EDT

## 2022-06-10 LAB
ALBUMIN SERPL-MCNC: 1.95 G/DL (ref 3.5–5.2)
ALBUMIN/GLOB SERPL: 0.6 G/DL
ALP SERPL-CCNC: 295 U/L (ref 39–117)
ALT SERPL W P-5'-P-CCNC: 53 U/L (ref 1–41)
ANION GAP SERPL CALCULATED.3IONS-SCNC: 9.2 MMOL/L (ref 5–15)
AST SERPL-CCNC: 84 U/L (ref 1–40)
BASOPHILS # BLD AUTO: 0.01 10*3/MM3 (ref 0–0.2)
BASOPHILS NFR BLD AUTO: 0.3 % (ref 0–1.5)
BILIRUB SERPL-MCNC: 0.4 MG/DL (ref 0–1.2)
BUN SERPL-MCNC: 18 MG/DL (ref 6–20)
BUN/CREAT SERPL: 25.4 (ref 7–25)
CALCIUM SPEC-SCNC: 7.8 MG/DL (ref 8.6–10.5)
CHLORIDE SERPL-SCNC: 100 MMOL/L (ref 98–107)
CO2 SERPL-SCNC: 24.8 MMOL/L (ref 22–29)
CREAT SERPL-MCNC: 0.71 MG/DL (ref 0.76–1.27)
CRP SERPL-MCNC: 3.08 MG/DL (ref 0–0.5)
DEPRECATED RDW RBC AUTO: 51.8 FL (ref 37–54)
EGFRCR SERPLBLD CKD-EPI 2021: 107 ML/MIN/1.73
EOSINOPHIL # BLD AUTO: 0.12 10*3/MM3 (ref 0–0.4)
EOSINOPHIL NFR BLD AUTO: 3.3 % (ref 0.3–6.2)
ERYTHROCYTE [DISTWIDTH] IN BLOOD BY AUTOMATED COUNT: 17.4 % (ref 12.3–15.4)
FOLATE SERPL-MCNC: 8.1 NG/ML (ref 4.78–24.2)
GLOBULIN UR ELPH-MCNC: 3.4 GM/DL
GLUCOSE BLDC GLUCOMTR-MCNC: 137 MG/DL (ref 70–130)
GLUCOSE BLDC GLUCOMTR-MCNC: 158 MG/DL (ref 70–130)
GLUCOSE BLDC GLUCOMTR-MCNC: 90 MG/DL (ref 70–130)
GLUCOSE SERPL-MCNC: 174 MG/DL (ref 65–99)
HAPTOGLOB SERPL-MCNC: 207 MG/DL (ref 30–200)
HCT VFR BLD AUTO: 29.5 % (ref 37.5–51)
HGB BLD-MCNC: 8.9 G/DL (ref 13–17.7)
IMM GRANULOCYTES # BLD AUTO: 0.01 10*3/MM3 (ref 0–0.05)
IMM GRANULOCYTES NFR BLD AUTO: 0.3 % (ref 0–0.5)
LYMPHOCYTES # BLD AUTO: 1.62 10*3/MM3 (ref 0.7–3.1)
LYMPHOCYTES NFR BLD AUTO: 45 % (ref 19.6–45.3)
MCH RBC QN AUTO: 24.7 PG (ref 26.6–33)
MCHC RBC AUTO-ENTMCNC: 30.2 G/DL (ref 31.5–35.7)
MCV RBC AUTO: 81.9 FL (ref 79–97)
MONOCYTES # BLD AUTO: 0.43 10*3/MM3 (ref 0.1–0.9)
MONOCYTES NFR BLD AUTO: 11.9 % (ref 5–12)
NEUTROPHILS NFR BLD AUTO: 1.41 10*3/MM3 (ref 1.7–7)
NEUTROPHILS NFR BLD AUTO: 39.2 % (ref 42.7–76)
NRBC BLD AUTO-RTO: 0 /100 WBC (ref 0–0.2)
PLATELET # BLD AUTO: 81 10*3/MM3 (ref 140–450)
PMV BLD AUTO: 11.1 FL (ref 6–12)
POTASSIUM SERPL-SCNC: 4 MMOL/L (ref 3.5–5.2)
PROT SERPL-MCNC: 5.3 G/DL (ref 6–8.5)
RBC # BLD AUTO: 3.6 10*6/MM3 (ref 4.14–5.8)
SODIUM SERPL-SCNC: 134 MMOL/L (ref 136–145)
VANCOMYCIN TROUGH SERPL-MCNC: 19 MCG/ML (ref 5–20)
VIT B12 BLD-MCNC: 1026 PG/ML (ref 211–946)
WBC NRBC COR # BLD: 3.6 10*3/MM3 (ref 3.4–10.8)

## 2022-06-10 PROCEDURE — C1751 CATH, INF, PER/CENT/MIDLINE: HCPCS

## 2022-06-10 PROCEDURE — 97530 THERAPEUTIC ACTIVITIES: CPT | Performed by: OCCUPATIONAL THERAPIST

## 2022-06-10 PROCEDURE — 99232 SBSQ HOSP IP/OBS MODERATE 35: CPT | Performed by: INTERNAL MEDICINE

## 2022-06-10 PROCEDURE — 86140 C-REACTIVE PROTEIN: CPT | Performed by: INTERNAL MEDICINE

## 2022-06-10 PROCEDURE — 36410 VNPNXR 3YR/> PHY/QHP DX/THER: CPT

## 2022-06-10 PROCEDURE — 82962 GLUCOSE BLOOD TEST: CPT

## 2022-06-10 PROCEDURE — 97530 THERAPEUTIC ACTIVITIES: CPT

## 2022-06-10 PROCEDURE — 97116 GAIT TRAINING THERAPY: CPT

## 2022-06-10 PROCEDURE — 63710000001 INSULIN DETEMIR PER 5 UNITS: Performed by: INTERNAL MEDICINE

## 2022-06-10 PROCEDURE — 63710000001 INSULIN ASPART PER 5 UNITS: Performed by: FAMILY MEDICINE

## 2022-06-10 PROCEDURE — 97110 THERAPEUTIC EXERCISES: CPT

## 2022-06-10 PROCEDURE — 85025 COMPLETE CBC W/AUTO DIFF WBC: CPT | Performed by: INTERNAL MEDICINE

## 2022-06-10 PROCEDURE — 97110 THERAPEUTIC EXERCISES: CPT | Performed by: OCCUPATIONAL THERAPIST

## 2022-06-10 PROCEDURE — 25010000002 VANCOMYCIN 5 G RECONSTITUTED SOLUTION: Performed by: INTERNAL MEDICINE

## 2022-06-10 PROCEDURE — 25010000002 VANCOMYCIN 5 G RECONSTITUTED SOLUTION: Performed by: FAMILY MEDICINE

## 2022-06-10 PROCEDURE — 97535 SELF CARE MNGMENT TRAINING: CPT | Performed by: OCCUPATIONAL THERAPIST

## 2022-06-10 PROCEDURE — 80053 COMPREHEN METABOLIC PANEL: CPT | Performed by: INTERNAL MEDICINE

## 2022-06-10 PROCEDURE — 80202 ASSAY OF VANCOMYCIN: CPT

## 2022-06-10 RX ORDER — IRON POLYSACCHARIDE COMPLEX 150 MG
150 CAPSULE ORAL DAILY
Status: DISCONTINUED | OUTPATIENT
Start: 2022-06-10 | End: 2022-06-16 | Stop reason: HOSPADM

## 2022-06-10 RX ADMIN — Medication 10 ML: at 21:46

## 2022-06-10 RX ADMIN — INSULIN DETEMIR 20 UNITS: 100 INJECTION, SOLUTION SUBCUTANEOUS at 10:37

## 2022-06-10 RX ADMIN — SPIRONOLACTONE 50 MG: 25 TABLET ORAL at 09:28

## 2022-06-10 RX ADMIN — ASPIRIN 81 MG: 81 TABLET, COATED ORAL at 09:28

## 2022-06-10 RX ADMIN — VANCOMYCIN HYDROCHLORIDE 1500 MG: 5 INJECTION, POWDER, LYOPHILIZED, FOR SOLUTION INTRAVENOUS at 21:45

## 2022-06-10 RX ADMIN — VANCOMYCIN HYDROCHLORIDE 1750 MG: 5 INJECTION, POWDER, LYOPHILIZED, FOR SOLUTION INTRAVENOUS at 10:53

## 2022-06-10 RX ADMIN — DOCUSATE SODIUM 50 MG AND SENNOSIDES 8.6 MG 2 TABLET: 8.6; 5 TABLET, FILM COATED ORAL at 02:35

## 2022-06-10 RX ADMIN — PANTOPRAZOLE SODIUM 40 MG: 40 TABLET, DELAYED RELEASE ORAL at 06:14

## 2022-06-10 RX ADMIN — Medication 10 ML: at 09:31

## 2022-06-10 RX ADMIN — Medication 1 TABLET: at 09:28

## 2022-06-10 RX ADMIN — DOCUSATE SODIUM 50 MG AND SENNOSIDES 8.6 MG 2 TABLET: 8.6; 5 TABLET, FILM COATED ORAL at 21:42

## 2022-06-10 RX ADMIN — INSULIN ASPART 15 UNITS: 100 INJECTION, SOLUTION INTRAVENOUS; SUBCUTANEOUS at 12:30

## 2022-06-10 RX ADMIN — NADOLOL 20 MG: 40 TABLET ORAL at 09:28

## 2022-06-10 RX ADMIN — METFORMIN HYDROCHLORIDE 500 MG: 500 TABLET ORAL at 17:02

## 2022-06-10 RX ADMIN — NYSTATIN: 100000 POWDER TOPICAL at 09:31

## 2022-06-10 RX ADMIN — Medication 150 MG: at 18:50

## 2022-06-10 RX ADMIN — FUROSEMIDE 40 MG: 40 TABLET ORAL at 09:28

## 2022-06-10 RX ADMIN — METFORMIN HYDROCHLORIDE 500 MG: 500 TABLET ORAL at 09:28

## 2022-06-10 RX ADMIN — FUROSEMIDE 40 MG: 40 TABLET ORAL at 17:02

## 2022-06-10 RX ADMIN — ATORVASTATIN CALCIUM 40 MG: 40 TABLET, FILM COATED ORAL at 22:25

## 2022-06-10 RX ADMIN — NYSTATIN: 100000 POWDER TOPICAL at 22:53

## 2022-06-10 RX ADMIN — Medication 10 ML: at 21:45

## 2022-06-10 RX ADMIN — INSULIN DETEMIR 20 UNITS: 100 INJECTION, SOLUTION SUBCUTANEOUS at 21:00

## 2022-06-10 RX ADMIN — INSULIN ASPART 2 UNITS: 100 INJECTION, SOLUTION INTRAVENOUS; SUBCUTANEOUS at 12:30

## 2022-06-10 RX ADMIN — INSULIN ASPART 15 UNITS: 100 INJECTION, SOLUTION INTRAVENOUS; SUBCUTANEOUS at 09:29

## 2022-06-10 NOTE — PROGRESS NOTES
Melchor Hardwick  [id number]  1965  6/10/2022    Referring Provider:   Dr. ZEYAD Giordano    Reason for Followup:   Thrombocytopenia    Patient Care Team:  Missy Up APRN as PCP - General (Nurse Practitioner)    Chief Complaint:  Thrombocytopenia        Subjective:    Patient denies of any significant complaints today. He is currently working with physical therapy. Denies of any bleeding or easy bruising.          Allergies:    Patient has no known allergies.    Outpatient Medications:  Medications Prior to Admission   Medication Sig Dispense Refill Last Dose   • glimepiride (AMARYL) 4 MG tablet Take 4 mg by mouth 2 (Two) Times a Day.   Unknown at Unknown time   • hydroCHLOROthiazide (HYDRODIURIL) 12.5 MG tablet Take 12.5 mg by mouth Daily As Needed (edema).   Unknown at Unknown time   • insulin NPH (humuLIN N,novoLIN N) 100 UNIT/ML injection Inject 30 Units under the skin into the appropriate area as directed 2 (Two) Times a Day Before Meals.   Unknown at Unknown time   • losartan (COZAAR) 100 MG tablet Take 1 tablet by mouth Daily.   Unknown at Unknown time   • lovastatin (MEVACOR) 10 MG tablet Take 10 mg by mouth Every Night.   Unknown at Unknown time   • metFORMIN (GLUCOPHAGE) 1000 MG tablet Take 1,000 mg by mouth 2 (Two) Times a Day With Meals.   Unknown at Unknown time       Inpatient Medications:  Scheduled Meds:  aspirin, 81 mg, Oral, Daily  atorvastatin, 40 mg, Oral, Nightly  furosemide, 40 mg, Oral, BID  Insulin Aspart, 0-7 Units, Subcutaneous, TID AC  Insulin Aspart, 15 Units, Subcutaneous, TID AC  insulin detemir, 20 Units, Subcutaneous, Q12H  metFORMIN, 500 mg, Oral, BID With Meals  multivitamin, 1 tablet, Oral, Daily  nadolol, 20 mg, Oral, Q24H  nystatin, , Topical, Q12H  pantoprazole, 40 mg, Oral, Q AM  senna-docusate sodium, 2 tablet, Oral, BID  sodium chloride, 10 mL, Intravenous, Q12H  sodium chloride, 10 mL, Intravenous, Q12H  spironolactone, 50 mg, Oral, Daily  vancomycin, 1,750 mg,  Intravenous, Q12H        Continuous Infusions:       PRN Meds:  senna-docusate sodium **AND** polyethylene glycol **AND** bisacodyl **AND** bisacodyl  •  dextrose  •  dextrose  •  glucagon (human recombinant)  •  HYDROcodone-acetaminophen  •  magic barrier cream  •  melatonin  •  potassium chloride **OR** potassium chloride **OR** potassium chloride  •  sodium chloride  •  sodium chloride  •  sodium chloride      Review of Systems:  A comprehensive 14 point review of systems was conducted with patient and positive as per HPI otherwise negative. No bleeding.         Physical Exam:  Vital Signs: These were reviewed and listed as per patient’s electronic medical chart  Vitals:    06/09/22 1905   BP: 104/55   Pulse: 73   Resp: 20   Temp: 98.1 °F (36.7 °C)   SpO2: 94%     General: Awake, alert and oriented, in no distress, obese, sitting in wheelchair  HEENT: Head is atraumatic, normocephalic, extraocular movements full, oropharynx clear, no scleral icterus  Neck: supple, no jvd, lymphadenopathy or masses  Cardiovascular: regular rate and rhythm without murmurs, rubs or gallops  Pulmonary: non-labored, clear to auscultation bilaterally, no wheezing  Abdomen: soft, non-tender, non-distended, normal active bowel sounds present, no organomegaly  Extremities: No clubbing, cyanosis, positive edema  Lymph: No cervical, supraclavicular adenopathy  Neurologic: Mental status as above, alert, awake and oriented, grossly non-focal exam  Skin: warm, dry, intact  MSK: left lower extremity in wrap          Labs / Studies:  Lab Results (last 72 hours)     Procedure Component Value Units Date/Time    POC Glucose Once [[id number]]  (Abnormal) Collected: 06/10/22 1122    Specimen: Blood Updated: 06/10/22 1133     Glucose 158 mg/dL      Comment: Meter: [id number] : [id number] leonie lino       Vancomycin, Trough [[id number]]  (Normal) Collected: 06/10/22 0947    Specimen: Blood Updated: 06/10/22 1043     Vancomycin Trough 19.00  mcg/mL     Narrative:      Therapeutic Ranges for Vancomycin    Vancomycin Random   5.0-40.0 mcg/mL  Vancomycin Trough   5.0-20.0 mcg/mL  Vancomycin Peak     20.0-40.0 mcg/mL    POC Glucose Once [[id number]]  (Abnormal) Collected: 06/10/22 0656    Specimen: Blood Updated: 06/10/22 0707     Glucose 137 mg/dL      Comment: Meter: [id number] : [id number] Raul Wu       Haptoglobin [[id number]]  (Abnormal) Collected: 06/09/22 1604    Specimen: Blood Updated: 06/10/22 0426     Haptoglobin 207 mg/dL     Folate [[id number]]  (Normal) Collected: 06/09/22 1604    Specimen: Blood Updated: 06/10/22 0401     Folate 8.10 ng/mL     Narrative:      Results may be falsely increased if patient taking Biotin.      Vitamin B12 [[id number]]  (Abnormal) Collected: 06/09/22 1604    Specimen: Blood Updated: 06/10/22 0401     Vitamin B-12 1,026 pg/mL     Narrative:      Results may be falsely increased if patient taking Biotin.      Comprehensive Metabolic Panel [[id number]]  (Abnormal) Collected: 06/10/22 0035    Specimen: Blood Updated: 06/10/22 0205     Glucose 174 mg/dL      BUN 18 mg/dL      Creatinine 0.71 mg/dL      Sodium 134 mmol/L      Potassium 4.0 mmol/L      Chloride 100 mmol/L      CO2 24.8 mmol/L      Calcium 7.8 mg/dL      Total Protein 5.3 g/dL      Albumin 1.95 g/dL      ALT (SGPT) 53 U/L      AST (SGOT) 84 U/L      Alkaline Phosphatase 295 U/L      Total Bilirubin 0.4 mg/dL      Globulin 3.4 gm/dL      A/G Ratio 0.6 g/dL      BUN/Creatinine Ratio 25.4     Anion Gap 9.2 mmol/L      eGFR 107.0 mL/min/1.73      Comment: National Kidney Foundation and American Society of Nephrology (ASN) Task Force recommended calculation based on the Chronic Kidney Disease Epidemiology Collaboration (CKD-EPI) equation refit without adjustment for race.       Narrative:      GFR Normal >60  Chronic Kidney Disease <60  Kidney Failure <15      CBC & Differential [[id number]]  (Abnormal) Collected: 06/10/22 0035    Specimen: Blood  Updated: 06/10/22 0140    Narrative:      The following orders were created for panel order CBC & Differential.  Procedure                               Abnormality         Status                     ---------                               -----------         ------                     CBC Auto Differential[[id number]]        Abnormal            Final result                 Please view results for these tests on the individual orders.    CBC Auto Differential [[id number]]  (Abnormal) Collected: 06/10/22 0035    Specimen: Blood Updated: 06/10/22 0140     WBC 3.60 10*3/mm3      RBC 3.60 10*6/mm3      Hemoglobin 8.9 g/dL      Hematocrit 29.5 %      MCV 81.9 fL      MCH 24.7 pg      MCHC 30.2 g/dL      RDW 17.4 %      RDW-SD 51.8 fl      MPV 11.1 fL      Platelets 81 10*3/mm3      Neutrophil % 39.2 %      Lymphocyte % 45.0 %      Monocyte % 11.9 %      Eosinophil % 3.3 %      Basophil % 0.3 %      Immature Grans % 0.3 %      Neutrophils, Absolute 1.41 10*3/mm3      Lymphocytes, Absolute 1.62 10*3/mm3      Monocytes, Absolute 0.43 10*3/mm3      Eosinophils, Absolute 0.12 10*3/mm3      Basophils, Absolute 0.01 10*3/mm3      Immature Grans, Absolute 0.01 10*3/mm3      nRBC 0.0 /100 WBC     Ferritin [[id number]]  (Normal) Collected: 06/09/22 0142    Specimen: Blood Updated: 06/09/22 1652     Ferritin 188.50 ng/mL     Narrative:      Results may be falsely decreased if patient taking Biotin.      Iron Profile [[id number]]  (Abnormal) Collected: 06/09/22 0142    Specimen: Blood Updated: 06/09/22 1646     Iron 29 mcg/dL      Iron Saturation 11 %      Transferrin 182 mg/dL      TIBC 271 mcg/dL     Lactate Dehydrogenase [[id number]]  (Abnormal) Collected: 06/09/22 0142    Specimen: Blood Updated: 06/09/22 1646      U/L     POC Glucose Once [[id number]]  (Normal) Collected: 06/09/22 1627    Specimen: Blood Updated: 06/09/22 1633     Glucose 89 mg/dL      Comment: Meter: [id number] : [id number] crystal lino        Protime-INR [[id number]]  (Normal) Collected: 06/09/22 1604    Specimen: Blood Updated: 06/09/22 1627     Protime 14.0 Seconds      INR 1.06    Narrative:      Suggested INR therapeutic range for stable oral anticoagulant therapy:    Low Intensity therapy:   1.5-2.0  Moderate Intensity therapy:   2.0-3.0  High Intensity therapy:   2.5-4.0    Fibrinogen [[id number]]  (Normal) Collected: 06/09/22 1604    Specimen: Blood Updated: 06/09/22 1627     Fibrinogen 390 mg/dL     aPTT [[id number]]  (Normal) Collected: 06/09/22 1604    Specimen: Blood Updated: 06/09/22 1627     PTT 32.5 seconds     Narrative:      PTT Heparin Therapeutic Range:  59 - 95 seconds      Reticulocytes [[id number]]  (Normal) Collected: 06/09/22 0142    Specimen: Blood Updated: 06/09/22 1437     Reticulocyte % 1.70 %      Reticulocyte Absolute 0.0610 10*6/mm3     Peripheral Blood Smear [[id number]] Collected: 06/09/22 0142    Specimen: Blood Updated: 06/09/22 1425    POC Glucose Once [[id number]]  (Normal) Collected: 06/09/22 1125    Specimen: Blood Updated: 06/09/22 1132     Glucose 93 mg/dL      Comment: Meter: [id number] : [id number] leonie lino       Heparin-induced Platelet Antibody [[id number]] Collected: 06/09/22 1004    Specimen: Blood Updated: 06/09/22 1047    POC Glucose Once [[id number]]  (Normal) Collected: 06/09/22 0649    Specimen: Blood Updated: 06/09/22 0655     Glucose 85 mg/dL      Comment: Meter: [id number] : [id number] YAMILET GAMA       Comprehensive Metabolic Panel [[id number]]  (Abnormal) Collected: 06/09/22 0142    Specimen: Blood Updated: 06/09/22 0237     Glucose 159 mg/dL      BUN 17 mg/dL      Creatinine 0.70 mg/dL      Sodium 133 mmol/L      Potassium 3.5 mmol/L      Chloride 99 mmol/L      CO2 25.0 mmol/L      Calcium 7.8 mg/dL      Total Protein 5.2 g/dL      Albumin 1.87 g/dL      ALT (SGPT) 48 U/L      AST (SGOT) 74 U/L      Alkaline Phosphatase 275 U/L      Total Bilirubin 0.4 mg/dL      Globulin 3.3 gm/dL       A/G Ratio 0.6 g/dL      BUN/Creatinine Ratio 24.3     Anion Gap 9.0 mmol/L      eGFR 107.5 mL/min/1.73      Comment: National Kidney Foundation and American Society of Nephrology (ASN) Task Force recommended calculation based on the Chronic Kidney Disease Epidemiology Collaboration (CKD-EPI) equation refit without adjustment for race.       Narrative:      GFR Normal >60  Chronic Kidney Disease <60  Kidney Failure <15      C-reactive Protein [[id number]]  (Abnormal) Collected: 06/09/22 0142    Specimen: Blood Updated: 06/09/22 0237     C-Reactive Protein 3.19 mg/dL     CBC & Differential [[id number]]  (Abnormal) Collected: 06/09/22 0142    Specimen: Blood Updated: 06/09/22 0206    Narrative:      The following orders were created for panel order CBC & Differential.  Procedure                               Abnormality         Status                     ---------                               -----------         ------                     CBC Auto Differential[[id number]]        Abnormal            Final result                 Please view results for these tests on the individual orders.    CBC Auto Differential [[id number]]  (Abnormal) Collected: 06/09/22 0142    Specimen: Blood Updated: 06/09/22 0206     WBC 3.93 10*3/mm3      RBC 3.54 10*6/mm3      Hemoglobin 8.8 g/dL      Hematocrit 28.1 %      MCV 79.4 fL      MCH 24.9 pg      MCHC 31.3 g/dL      RDW 17.2 %      RDW-SD 49.9 fl      MPV 10.3 fL      Platelets 88 10*3/mm3      Neutrophil % 43.9 %      Lymphocyte % 42.0 %      Monocyte % 10.4 %      Eosinophil % 3.1 %      Basophil % 0.3 %      Immature Grans % 0.3 %      Neutrophils, Absolute 1.73 10*3/mm3      Lymphocytes, Absolute 1.65 10*3/mm3      Monocytes, Absolute 0.41 10*3/mm3      Eosinophils, Absolute 0.12 10*3/mm3      Basophils, Absolute 0.01 10*3/mm3      Immature Grans, Absolute 0.01 10*3/mm3      nRBC 0.0 /100 WBC     POC Glucose Once [[id number]]  (Normal) Collected: 06/08/22 1801    Specimen: Blood  Updated: 06/08/22 1808     Glucose 126 mg/dL      Comment: Meter: [id number] : [id number] Negrete Eunice       POC Glucose Once [[id number]]  (Abnormal) Collected: 06/08/22 1631    Specimen: Blood Updated: 06/08/22 1703     Glucose 69 mg/dL      Comment: Meter: [id number] : [id number] Negrete Eunice       POC Glucose Once [[id number]]  (Abnormal) Collected: 06/08/22 1122    Specimen: Blood Updated: 06/08/22 1134     Glucose 178 mg/dL      Comment: RN Notified Meter: [id number] : [id number] DAYNE VILLANUEVA       POC Glucose Once [[id number]]  (Normal) Collected: 06/08/22 0610    Specimen: Blood Updated: 06/08/22 0617     Glucose 77 mg/dL      Comment: Meter: [id number] : [id number] [place]       POC Glucose Once [[id number]]  (Normal) Collected: 06/07/22 1636    Specimen: Blood Updated: 06/07/22 1655     Glucose 71 mg/dL      Comment: Meter: [id number] : [id number] Negrete Eunice               Imaging Results (Last 72 Hours)     ** No results found for the last 72 hours. **                Assessment & Plan:  Melchor Hardwick is a 57 y.o. male seen today at the request of Dr. Giordano for evaluation and treatment of thrombocytopenia.     Thrombocytopenia  Normocytic anemia  - Patient's platelet count has fluctuated during admission and more recently was found to be the lowest at 88 thousand and today 81 thousand.  - B12 is elevated with normal folate level. Iron studies reveal an iron deficiency which is likely contributing to his anemia and would recommend starting Niferex 150 mg daily.   - HIV and acute hepatitis panel are nonreactive. No evidence of DIC with normal fibrinogen and coagulation markers. No evidence of hemolysis and therefore TTP less likely with normal retic count and an elevated (not low haptoglobin). 4T score for HIT is low, however Lovenox has been held.  - Peripheral smear pending.   - Imaging consistent for cirrhosis with splenomegaly, changes of portal hypertension, ascites, and  anasarca.   - I suspect thrombocytopenia is likely multifactorial and secondary to MRSA bacteremia/osteomyelitis (although improved patient has elevated CRP indicating underlying inflammation that can cause bone marrow suppression) versus medications versus underlying cirrhosis and resulting splenomegaly. Liver disease can impair platelet production, from decreased hepatic synthesis of thrombopoietin and there can also be increased platelet sequestration in the spleen, in the setting of portal hypertension and hypersplenism.  - Would continue to monitor at present, avoid NSAIDs, transfuse if platelet count is <10,000 or if platelet count is <50,000 and patient requires procedures or experiences active bleeding.     PAZ cirrhosis  - Will need hepatology follow up for consideration of endoscopic procedures to assess for varices as well as will need colonoscopy. Patient has never had a colonoscopy and his mother was diagnosed with metastatic colon cancer at 55.      Thank you for consultation will follow along with you. Patients questions were answered to his satisfaction.       Sushila Tadeo MD  06/10/22  12:16 EDT

## 2022-06-10 NOTE — PROGRESS NOTES
Pharmacokinetics Service Note:    Mr. Hardwick continues on day 15 of vancomycin for bone and joint infection.  An 11 hour post infusion level was reported as 19 mg/L.  The AUC is calculated at 610 mg/L.hr on this regimen. This is greater than desired and consistent with excellent clearance. Will decrease the dosage from 1750 mg Q12H to 1500 mg Q12H to target an AUC of 400-600 mg/L.hr.  Will obtain a repeat level in 5-7 days to guide further dosing. Pharmacy will continue to follow.    Thank you,  France Rayo, PharmD  06/10/22  13:19 EDT

## 2022-06-10 NOTE — PROGRESS NOTES
Rehabilitation Nursing  Inpatient Rehabilitation Plan of Care Note    Plan of Care  Pain    Pain Management (Active)  Current Status (6/2/2022 4:40:00 PM): potential for increased pain  Weekly Goal: pain at a tolerable level  Discharge Goal: no pain    Body Function Structure    Skin Integrity (Active)  Current Status (6/2/2022 4:40:00 PM): impaired skin integrity  Weekly Goal: improved skin integrity  Discharge Goal: wound healing    Safety    Potential for Injury (Active)  Current Status (6/2/2022 4:40:00 PM): potential for falls  Weekly Goal: no falls  Discharge Goal: no fallsC    Signed by: Kimmie Mcdaniel Nurse

## 2022-06-10 NOTE — PROGRESS NOTES
[facility]  PROGRESS NOTE     Patient Identification:  Name:  Melchor Hardwick  Age:  57 y.o.  Sex:  male  :  1965  MRN:  [id number]  Visit Number:  [id number]  ROOM: 109/2S     Primary Care Provider:  Missy Up APRN    Length of stay in inpatient status:  8    Subjective     Chief Compliant:  Critical illness myopathy    History of Presenting Illness: 57-year-old gentleman being treated for critical illness myopathy.  Patient is status post left midfoot amputation secondary to osteomyelitis with staphylococcal aureus bacteremia.  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      Patient is awake and alert, sitting up in bed comfortably eating breakfast.  Denies any complaints this morning.  Lab values reviewed, sodium stable at 134.  Thrombocytopenia is worsened to 81.  Hematology note reviewed.  Nursing reported an area of induration with erythema around the PICC insertion site.  Vital signs stable on room air.  No acute distress.    Objective     Current Hospital Meds:aspirin, 81 mg, Oral, Daily  atorvastatin, 40 mg, Oral, Nightly  furosemide, 40 mg, Oral, BID  Insulin Aspart, 0-7 Units, Subcutaneous, TID AC  Insulin Aspart, 15 Units, Subcutaneous, TID AC  insulin detemir, 20 Units, Subcutaneous, Q12H  metFORMIN, 500 mg, Oral, BID With Meals  multivitamin, 1 tablet, Oral, Daily  nadolol, 20 mg, Oral, Q24H  nystatin, , Topical, Q12H  pantoprazole, 40 mg, Oral, Q AM  senna-docusate sodium, 2 tablet, Oral, BID  sodium chloride, 10 mL, Intravenous, Q12H  sodium chloride, 10 mL, Intravenous, Q12H  spironolactone, 50 mg, Oral, Daily  vancomycin, 1,750 mg, Intravenous, Q12H       ----------------------------------------------------------------------------------------------------------------------  Vital Signs:  Temp:  [98.1 °F (36.7 °C)] 98.1 °F (36.7 °C)  Heart Rate:  [73]  73  Resp:  [20] 20  BP: (104)/(55) 104/55  SpO2:  [94 %] 94 %  on   ;   Device (Oxygen Therapy): room air  Body mass index is 37.12 kg/m².    Wt Readings from Last 3 Encounters:   06/10/22 117 kg (258 lb 11.2 oz)   06/02/22 117 kg (258 lb)     Intake & Output (last 3 days)       06/07 0701 06/08 0700 06/08 0701 06/09 0700 06/09 0701  06/10 0700 06/10 0701 06/11 0700    P.O. 1920 2040 1440 360    IV Piggyback 1000 500 500     Total Intake(mL/kg) 2920 (24.7) 2540 (21.5) 1940 (16.4) 360 (3.1)    Urine (mL/kg/hr) 5550 (2) 4525 (1.6) 4700 (1.7) 950 (1.9)    Stool 0 0 0     Total Output 5550 4525 4700 950    Net -2630 -1985 -2760 -590            Stool Unmeasured Occurrence 3 x 1 x 1 x         Diet Regular; Cardiac, Consistent Carbohydrate, Daily Fluid Restriction; 1500 mL Fluid Per Day; High Protein  ----------------------------------------------------------------------------------------------------------------------  Physical exam:  Constitutional:   Sitting up comfortably in bed eating breakfast.  Denies any complaints.  No acute distress.  HEENT: Normocephalic atraumatic  Neck:    Supple  Cardiovascular: Regular rate and rhythm  Pulmonary/Chest: Clear to auscultation  Abdominal: Positive bowel sounds.  Abdomen rounded, soft.  Musculoskeletal: Status post left forefoot amputation--wound appears to be healing well with no surrounding erythema or drainage  Neurological: Sensory change stocking glove distribution  Skin: Area of induration with erythema at PICC line insertion site, no drainage noted  Peripheral vascular: Continued dependent edema in bilateral lower extremities Genitourinary: Scrotal edema improved  ----------------------------------------------------------------------------------------------------------------------    Last echocardiogram:  Results for orders placed during the hospital encounter of 05/10/22    Adult Transthoracic Echo Complete W/ Cont if Necessary Per Protocol    Interpretation Summary  ·  Normal left ventricular cavity size and wall thickness noted. All left ventricular wall segments contract normally.  · Left ventricular ejection fraction appears to be 61 - 65%.  · The mitral valve is structurally normal with no significant stenosis present. Trace mitral valve regurgitation is present.  · The aortic valve is structurally normal with no regurgitation or stenosis present.  · There is no evidence of pericardial effusion.    ----------------------------------------------------------------------------------------------------------------------  Results from last 7 days   Lab Units 06/10/22  0035 06/09/22  1604 06/09/22  0142 06/06/22  0040   CRP mg/dL  --   --  3.19*  --    WBC 10*3/mm3 3.60  --  3.93 4.84   HEMOGLOBIN g/dL 8.9*  --  8.8* 9.4*   HEMATOCRIT % 29.5*  --  28.1* 29.7*   MCV fL 81.9  --  79.4 79.4   MCHC g/dL 30.2*  --  31.3* 31.6   PLATELETS 10*3/mm3 81*  --  88* 117*   INR   --  1.06  --   --          Results from last 7 days   Lab Units 06/10/22  0035 06/09/22  0142 06/06/22  1710 06/06/22  0040 06/04/22  1829 06/04/22  0030   SODIUM mmol/L 134* 133*  --  137  --  134*   POTASSIUM mmol/L 4.0 3.5 4.0 3.6   < > 3.3*   CHLORIDE mmol/L 100 99  --  105  --  104   CO2 mmol/L 24.8 25.0  --  22.6  --  23.1   BUN mg/dL 18 17  --  18  --  21*   CREATININE mg/dL 0.71* 0.70*  --  0.73*  --  0.73*   CALCIUM mg/dL 7.8* 7.8*  --  7.9*  --  8.1*   GLUCOSE mg/dL 174* 159*  --  84  --  86   ALBUMIN g/dL 1.95* 1.87*  --   --   --  1.89*   BILIRUBIN mg/dL 0.4 0.4  --   --   --  0.4   ALK PHOS U/L 295* 275*  --   --   --  293*   AST (SGOT) U/L 84* 74*  --   --   --  47*   ALT (SGPT) U/L 53* 48*  --   --   --  37    < > = values in this interval not displayed.   Estimated Creatinine Clearance: 147.1 mL/min (A) (by C-G formula based on SCr of 0.71 mg/dL (L)).  No results found for: AMMONIA              Glucose   Date/Time Value Ref Range Status   06/10/2022 0656 137 (H) 70 - 130 mg/dL Final     Comment:      Meter: [id number] : [id number] Raul Wu   06/09/2022 1627 89 70 - 130 mg/dL Final     Comment:     Meter: [id number] : [id number] crystal lino   06/09/2022 1125 93 70 - 130 mg/dL Final     Comment:     Meter: [id number] : [id number] crystal lino   06/09/2022 0649 85 70 - 130 mg/dL Final     Comment:     Meter: [id number] : [id number] YAMILET GAMA   06/08/2022 1801 126 70 - 130 mg/dL Final     Comment:     Meter: [id number] : [id number] Penelope Dawson   06/08/2022 1631 69 (L) 70 - 130 mg/dL Final     Comment:     Meter: [id number] : [id number] Penelope Dawson   06/08/2022 1122 178 (H) 70 - 130 mg/dL Final     Comment:     RN Notified Meter: [id number] : [id number] DAYNE KAY   06/08/2022 0610 77 70 - 130 mg/dL Final     Comment:     Meter: [id number] : [id number] Tracey Crystal     Lab Results   Component Value Date    TSH 4.700 (H) 05/30/2022    FREET4 0.87 (L) 05/30/2022     No results found for: PREGTESTUR, PREGSERUM, HCG, HCGQUANT  Pain Management Panel     Pain Management Panel Latest Ref Rng & Units 5/24/2022 5/11/2022    CREATININE UR mg/dL 91.0 -    AMPHETAMINES SCREEN, URINE Negative - Negative    BARBITURATES SCREEN Negative - Negative    BENZODIAZEPINE SCREEN, URINE Negative - Negative    BUPRENORPHINEUR Negative - Negative    COCAINE SCREEN, URINE Negative - Negative    METHADONE SCREEN, URINE Negative - Negative    METHAMPHETAMINEUR Negative - Negative        Brief Urine Lab Results  (Last result in the past 365 days)      Color   Clarity   Blood   Leuk Est   Nitrite   Protein   CREAT   Urine HCG        05/24/22 1839             91.0             No results found for: BLOODCX      No results found for: URINECX  No results found for: WOUNDCX  No results found for: STOOLCX  Results from last 7 days   Lab Units 06/09/22  0142   CRP mg/dL 3.19*       I have personally looked at the labs and they are summarized  above.  ----------------------------------------------------------------------------------------------------------------------  Detailed radiology reports for the last 24 hours:    Imaging Results (Last 24 Hours)     ** No results found for the last 24 hours. **        Final impressions for the last 30 days of radiology reports:    CT Abdomen Pelvis Without Contrast    Result Date: 5/25/2022  1.  Advanced liver cirrhosis with changes of portal hypertension which include spinal megaly, prominent portosystemic collateral vessels, small-moderate volume ascites, and marked anasarca. 2.  Moderate constipation and mild generalized ileus. No bowel obstruction. 3.  Miniscule pleural effusions. 4.  Other incidental and nonacute findings detailed above.  This report was finalized on 5/25/2022 8:48 AM by Dr. Brain Rodriguez MD.      XR Foot 3+ View Left    Result Date: 5/10/2022    Degenerative change of the midfoot.  This report was finalized on 5/10/2022 4:17 PM by Dr. Oswaldo Brown MD.      US Abdomen Complete    Result Date: 5/20/2022  1. Splenomegaly. 2. Small volume ascites.  This report was finalized on 5/20/2022 12:59 PM by Dr. Lobo Ring MD.      US Scrotum & Testicles    Result Date: 5/20/2022  Extensive scrotal wall swelling.   This report was finalized on 5/20/2022 12:59 PM by Dr. Lobo Ring MD.      US Scrotum & Testicles    Result Date: 5/16/2022  Scrotal wall thickening  This report was finalized on 5/16/2022 4:57 PM by Dr. Lobo Ring MD.      US Arterial Doppler Lower Extremity Left    Result Date: 5/10/2022  Abnormal examination demonstrating elevated flow velocities throughout the left lower extremity arterial system and biphasic flow within the left posterior tibial artery. Contemporaneously performed CT demonstrates imaging findings highly suspicious for gas gangrene and infection/osteomyelitis. CT dictated separately. Signer Name: Oscar Dixon MD  Signed: 5/10/2022 7:16 PM  Workstation Name:  [facility]  [facility]    MRI Foot Left Without Contrast    Result Date: 5/16/2022  Appearance concerning for osteomyelitis involving the 3rd metatarsal and likely the adjacent cuneiform.  This report was finalized on 5/16/2022 3:10 PM by Dr. Lobo Ring MD.      US Venous Doppler Lower Extremity Bilateral (duplex)    Result Date: 5/24/2022  No sonographic findings of DVT in the lower extremities.  This report was finalized on 5/24/2022 8:05 AM by Dr. Garry Lovelace II, MD.      CT Lower Extremity Left With Contrast    Result Date: 5/10/2022  Marked soft tissue edema and fat stranding at the level of the foot, diffuse with scattered foci of air both within soft tissues and bony structures. Findings are concerning for gas gangrene and osteomyelitis. Both the midfoot and forefoot are involved. Signer Name: ANTELMO COFFMAN MD  Signed: 5/10/2022 6:14 PM  Workstation Name: [facility]  [facility]    XR Foreign Body For MRI    Result Date: 5/16/2022  No radiopaque foreign body identified  This report was finalized on 5/16/2022 10:20 AM by Dr. Lobo Ring MD.      I have personally looked at the radiology images and read the final radiology report.    Assessment & Plan    Critical illness myopathy--participated with physical and Occupational Therapy on 6/9/2022: Performs bed mobility activities with contact-guard assist and verbal/nonverbal cues; performs transfer activities with minimal assist and verbal/nonverbal cues; utilize front wheeled walker/parallel bars for sit to stand/stand to sit transfer; ambulated 20 feet x 3 with minimal two-person assist and front wheeled walker; participated with fine motor manipulation/dexterity activity; gross motor coordination activities; occupation/activity based treatment; therapeutic exercise/ROM    Status post left foot forefoot amputation-- Dr. Colby is unavailable until next Monday.  Continue IV antibiotic therapy.  CRP  3.19.    Staphylococcal aureus bacteremia--continue vancomycin through June 23    Generalized edema with scrotal edema-- continue Lasix 40 mg twice daily.  Scrotal edema improved    Cirrhosis secondary to [place]-- nadolol.  Likely benefit from outpatient GI evaluation May need screening for esophageal varices    Diabetes mellitus--blood glucose 85 this morning.  Levemir  20 units twice daily    Anemia-- stable.  CBC in a.m.    Thrombocytopenia-- Lovenox held.  SCD ordered to right leg for DVT prophylaxis.  Hematology note reviewed, believed to be multifactorial.   will plan to change vancomycin course to daptomycin if thrombocytopenia continues to worsen    Hypertension-- continue current treatment    Obesity with BMI of 37.39 kg per metered square    Hyperlipidemia-- continue statin therapy    PICC line consult placed to exchange current PICC      VTE Prophylaxis:   Mechanical Order History:      Ordered        06/02/22 1707  Maintain Sequential Compression Device  Continuous                    Pharmalogical Order History:      Ordered     Dose Route Frequency Stop    06/02/22 1707  Enoxaparin Sodium (LOVENOX) syringe 40 mg         40 mg SC Nightly --                  ROSALINA Friend  06/10/22  11:19 EDT

## 2022-06-10 NOTE — THERAPY TREATMENT NOTE
Inpatient Rehabilitation - Physical Therapy Progress Note        [place]     Patient Name: Melchor Hardwick  : 1965  MRN: [id number]    Today's Date: 6/10/2022                    Admit Date: 2022      Visit Dx:   No diagnosis found.    Patient Active Problem List   Diagnosis   • Hyperlipidemia   • Hypertensive disorder   • Kidney disease   • Obesity   • Type 2 diabetes mellitus (HCC)   • Gas gangrene of foot (HCC)   • Cellulitis in diabetic foot (HCC)   • Other acute osteomyelitis of left foot (HCC)   • Osteomyelitis (HCC)   • Critical illness myopathy       Past Medical History:   Diagnosis Date   • Diabetes mellitus (HCC)    • Elevated cholesterol    • Hyperlipidemia    • Hypertension        Past Surgical History:   Procedure Laterality Date   • ABSCESS DRAINAGE      PERINEUM   • AMPUTATION FOOT Left 2022    Procedure: AMPUTATION FOOT;  Surgeon: Addison Colby MD;  Location: [facility];  Service: Podiatry;  Laterality: Left;   • INCISION AND DRAINAGE LEG Left 05/10/2022    Procedure: INCISION AND DRAINAGE LOWER EXTREMITY;  Surgeon: Addison Colby MD;  Location: [facility];  Service: Podiatry;  Laterality: Left;   • WOUND DEBRIDEMENT Left 2022    Procedure: DEBRIDEMENT FOOT;  Surgeon: Addison Colby MD;  Location: [facility];  Service: Podiatry;  Laterality: Left;       PT ASSESSMENT (last 12 hours)     IRF PT Evaluation and Treatment     Row Name 06/10/22 1455          PT Time and Intention    Document Type daily treatment;progress note  am session  -RG     Mode of Treatment individual therapy;physical therapy  -RG     Patient/Family/Caregiver Comments/Observations Pt and nursing in agreement for skilled PT on this date.  -RG     Row Name 06/10/22 1455          General Information    Patient Profile Reviewed yes  -RG     Existing Precautions/Restrictions fall;non-weight bearing  hernandez, <skin integrity L foot  -RG     Row Name 06/10/22 1456          Pain Scale: FACES Pre/Post-Treatment     Pain: FACES Scale, Pretreatment 6-->hurts even more  -RG     Posttreatment Pain Rating 6-->hurts even more  -RG     Row Name 06/10/22 1455          Cognition/Psychosocial    Affect/Mental Status (Cognition) WFL  -RG     Follows Commands (Cognition) WFL  -RG     Personal Safety Interventions gait belt;nonskid shoes/slippers when out of bed;fall prevention program maintained  -RG     Row Name 06/10/22 1455          Mobility    Left Lower Extremity (Weight-bearing Status) non weight-bearing (NWB)  -RG     Right Lower Extremity (Weight-bearing Status) weight-bearing as tolerated (WBAT)  -RG     Row Name 06/10/22 1455          Bed Mobility    Supine-Sit [place] (Bed Mobility) contact guard;nonverbal cues (demo/gesture);verbal cues  -     Row Name 06/10/22 1455          Transfers    Bed-Chair [place] (Transfers) minimum assist (75% patient effort);verbal cues;nonverbal cues (demo/gesture)  -RG     Chair-Bed [place] (Transfers) minimum assist (75% patient effort);verbal cues;nonverbal cues (demo/gesture)  -RG     Assistive Device (Bed-Chair Transfers) wheelchair;walker, front-wheeled  -RG     Sit-Stand [place] (Transfers) verbal cues;nonverbal cues (demo/gesture);contact guard  -RG     Stand-Sit [place] (Transfers) verbal cues;nonverbal cues (demo/gesture);contact guard  -RG     Row Name 06/10/22 1455          Chair-Bed Transfer    Assistive Device (Chair-Bed Transfers) wheelchair  -     Row Name 06/10/22 1455          Sit-Stand Transfer    Assistive Device (Sit-Stand Transfers) walker, front-wheeled;parallel bars  -RG     Row Name 06/10/22 1455          Stand-Sit Transfer    Assistive Device (Stand-Sit Transfers) wheelchair;walker, front-wheeled;parallel bars  -RG     Row Name 06/10/22 1455          Gait/Stairs (Locomotion)    [place] Level (Gait) minimum assist (75% patient effort);2 person assist  -RG     Assistive Device (Gait) walker, front-wheeled  -RG     Distance in Feet (Gait) 20  x 3  -RG     Pattern (Gait) --  hop to  -RG     Comment, (Gait/Stairs) Pt required rest break after 20'.  -RG     Row Name 06/10/22 1455          Hip (Therapeutic Exercise)    Hip Strengthening (Therapeutic Exercise) bilateral;flexion;aBduction;aDduction;external rotation;internal rotation;heel slides;marching while seated;sitting;supine;2 lb free weight;resistance band;green;10 repetitions;2 sets  -RG     Row Name 06/10/22 1455          Knee (Therapeutic Exercise)    Knee Strengthening (Therapeutic Exercise) bilateral;flexion;extension;heel slides;marching while seated;SAQ (short arc quad);LAQ (long arc quad);sitting;supine;2 lb free weight;resistance band;green;10 repetitions;2 sets  -RG     Row Name 06/10/22 1455          Ankle (Therapeutic Exercise)    Ankle Strengthening (Therapeutic Exercise) dorsiflexion;plantarflexion;sitting;supine;right;20 repititions  -RG     Row Name 06/10/22 1455          Positioning and Restraints    Pre-Treatment Position in bed  -RG     Post Treatment Position bed  -RG     In Bed notified nsg;supine;call light within reach;encouraged to call for assist;legs elevated  -RG     Row Name 06/10/22 1455          Therapy Assessment/Plan (PT)    Patient's Goals For Discharge return home  -RG     Row Name 06/10/22 1455          Therapy Assessment/Plan (PT)    Rehab Potential/Prognosis (PT) adequate, monitor progress closely  -RG     Frequency of Treatment (PT) 5 times per week  -RG     Estimated Duration of Therapy (PT) 1 week;2 weeks  -RG     Problem List (PT) balance;mobility;range of motion (ROM);strength;pain  -RG     Activity Limitations Related to Problem List (PT) unable to ambulate safely;unable to transfer safely  -RG     Row Name 06/10/22 1455          Therapy Plan Review/Discharge Plan (PT)    Anticipated Equipment Needs at Discharge (PT Eval) --  tbd  -RG     Anticipated Discharge Disposition (PT) home with home health  -RG     Row Name 06/10/22 1455          IRF PT Goals    Bed  Mobility Goal Selection (PT-IRF) bed mobility, PT goal 1  -RG     Transfer Goal Selection (PT-IRF) transfers, PT goal 1  -RG     Gait (Walking Locomotion) Goal Selection (PT-IRF) gait, PT goal 1  -RG     Row Name 06/10/22 1455          Bed Mobility Goal 1 (PT-IRF)    Activity/Assistive Device (Bed Mobility Goal 1, PT-IRF) sit to supine/supine to sit  -RG     Powers Level (Bed Mobility Goal 1, PT-IRF) modified independence  -RG     Time Frame (Bed Mobility Goal 1, PT-IRF) by discharge  -RG     Progress/Outcomes (Bed Mobility Goal 1, PT-IRF) goal ongoing  -RG     Row Name 06/10/22 1455          Transfer Goal 1 (PT-IRF)    Activity/Assistive Device (Transfer Goal 1, PT-IRF) sit-to-stand/stand-to-sit;bed-to-chair/chair-to-bed  -RG     [place]ers Level (Transfer Goal 1, PT-IRF) supervision required  -RG     Time Frame (Transfer Goal 1, PT-IRF) by discharge  -RG     Progress/Outcomes (Transfer Goal 1, PT-IRF) goal ongoing  -RG     Row Name 06/10/22 1455          Gait/Walking Locomotion Goal 1 (PT-IRF)    Activity/Assistive Device (Gait/Walking Locomotion Goal 1, PT-IRF) walker, rolling  -RG     Gait/Walking Locomotion Distance Goal 1 (PT-IRF) 2'  -RG     Powers Level (Gait/Walking Locomotion Goal 1, PT-IRF) supervision required  -RG     Time Frame (Gait/Walking Locomotion Goal 1, PT-IRF) by discharge  -RG     Progress/Outcomes (Gait/Walking Locomotion Goal 1, PT-IRF) goal ongoing  -RG           User Key  (r) = Recorded By, (t) = Taken By, (c) = Cosigned By    Initials Name Provider Type    RG Michi Mckeon, PTA Physical Therapist Assistant              Wound 05/16/22 1340 coccyx Pressure Injury (Active)   Dressing Appearance open to air 06/10/22 0740   Closure None 06/09/22 2053   Base red;blanchable 06/09/22 2053   Periwound blanchable;redness 06/09/22 2053   Periwound Temperature warm 06/09/22 2053   Periwound Skin Turgor soft 06/09/22 2053   Edges irregular 06/09/22 2053   Drainage Amount none 06/09/22  2053   Care, Wound barrier applied 06/10/22 0740   Dressing Care open to air 06/10/22 0740   Periwound Care barrier ointment applied 06/09/22 2053       Wound 05/18/22 1545 Left other (see comments) foot Incision (Active)   Dressing Appearance dry;intact 06/10/22 0740   Closure [place];Sutures 06/09/22 2053   Periwound intact 06/09/22 2053   Periwound Temperature warm 06/09/22 2053   Periwound Skin Turgor firm 06/09/22 2053   Drainage Characteristics/Odor bleeding controlled 06/09/22 2053   Drainage Amount none 06/09/22 2053   Dressing Care dressing changed 06/09/22 2053   Periwound Care dry periwound area maintained 06/09/22 2053   Adhesive Closure Strips Applied 06/09/22 2053       Wound 06/02/22 1240 scrotum MASD (Moisture associated skin damage) (Active)   Dressing Appearance open to air 06/10/22 0740   Closure Open to air 06/10/22 0740   Base red;moist 06/09/22 2053     Physical Therapy Education                 Title: PT OT SLP Therapies (Done)     Topic: Physical Therapy (Done)     Point: Mobility training (Done)     Learning Progress Summary           Patient Acceptance, E,D, VU,NR by  at 6/10/2022 1500      Show all documentation for this point (9)                 Point: Home exercise program (Done)     Learning Progress Summary           Patient Acceptance, E,D, VU,NR by  at 6/10/2022 1500      Show all documentation for this point (9)                 Point: Body mechanics (Done)     Learning Progress Summary           Patient Acceptance, E,D, VU,NR by  at 6/10/2022 1500      Show all documentation for this point (9)                 Point: Precautions (Done)     Learning Progress Summary           Patient Acceptance, E,D, VU,NR by  at 6/10/2022 1500      Show all documentation for this point (9)                             User Key     Initials Effective Dates Name Provider Type Discipline     06/16/21 -  Michi Mckeon PTA Physical Therapist Assistant PT                PT Recommendation and  Plan    Frequency of Treatment (PT): 5 times per week  Anticipated Equipment Needs at Discharge (PT Eval):  (tbd)                  Time Calculation:      PT Charges     Row Name 06/10/22 1501 06/10/22 1500          Time Calculation    Start Time 1245  -RG 1045  -RG     Stop Time 1330  -RG 1130  -RG     Time Calculation (min) 45 min  -RG 45 min  -RG     PT Received On 06/10/22  -RG 06/10/22  -RG            Time Calculation- PT    Total Timed Code Minutes- PT 45 minute(s)  -RG 45 minute(s)  -RG           User Key  (r) = Recorded By, (t) = Taken By, (c) = Cosigned By    Initials Name Provider Type    Michi White PTA Physical Therapist Assistant                Therapy Charges for Today     Code Description Service Date Service Provider Modifiers Qty    81040690264 HC GAIT TRAINING EA 15 MIN 6/10/2022 Michi Mckeon PTA GP, CQ 1    76710456382 HC PT THERAPEUTIC ACT EA 15 MIN 6/10/2022 Michi Mckeon PTA GP, CQ 2    49187056165 HC PT THER PROC EA 15 MIN 6/10/2022 Michi Mckeon PTA GP, CQ 3                   Michi Mckeon PTA  6/10/2022

## 2022-06-10 NOTE — THERAPY TREATMENT NOTE
Inpatient Rehabilitation - Occupational Therapy Progress Note and Treatment Note     [facility]     Patient Name: Melchor Hardwick  : 1965  MRN: [id number]    Today's Date: 6/10/2022                 Admit Date: 2022       No diagnosis found.    Patient Active Problem List   Diagnosis   • Hyperlipidemia   • Hypertensive disorder   • Kidney disease   • Obesity   • Type 2 diabetes mellitus (HCC)   • Gas gangrene of foot (HCC)   • Cellulitis in diabetic foot (HCC)   • Other acute osteomyelitis of left foot (HCC)   • Osteomyelitis (HCC)   • Critical illness myopathy       Past Medical History:   Diagnosis Date   • Diabetes mellitus (HCC)    • Elevated cholesterol    • Hyperlipidemia    • Hypertension        Past Surgical History:   Procedure Laterality Date   • ABSCESS DRAINAGE      PERINEUM   • AMPUTATION FOOT Left 2022    Procedure: AMPUTATION FOOT;  Surgeon: Addison Colby MD;  Location: [facility];  Service: Podiatry;  Laterality: Left;   • INCISION AND DRAINAGE LEG Left 05/10/2022    Procedure: INCISION AND DRAINAGE LOWER EXTREMITY;  Surgeon: Addison Colby MD;  Location: [facility];  Service: Podiatry;  Laterality: Left;   • WOUND DEBRIDEMENT Left 2022    Procedure: DEBRIDEMENT FOOT;  Surgeon: Addison Colby MD;  Location: [facility];  Service: Podiatry;  Laterality: Left;             IRF OT ASSESSMENT FLOWSHEET (last 12 hours)     IRF OT Evaluation and Treatment     Row Name 06/10/22 1418          OT Time and Intention    Document Type daily treatment;progress note  -BF     Mode of Treatment occupational therapy  -BF     Patient Effort good  -BF     Symptoms Noted During/After Treatment none  -BF     Row Name 06/10/22 1418          General Information    Existing Precautions/Restrictions fall;non-weight bearing;left  hernandez cath, < skin integrity  -BF     Row Name 06/10/22 1418          Cognition/Psychosocial    Orientation Status (Cognition) oriented x 4  -BF     Follows Commands (Cognition)  WFL  -BF     Row Name 06/10/22 1418          Bathing    Comment (Bathing) CGA  -BF     Row Name 06/10/22 1418          Upper Body Dressing    Comment (Upper Body Dressing) Set-up  -BF     Row Name 06/10/22 1418          Lower Body Dressing    Comment (Lower Body Dressing) CGA  -BF     Row Name 06/10/22 1418          Grooming    Comment (Grooming) Set-up  -BF     Row Name 06/10/22 1418          Toileting    Comment (Toileting) Max A- hrenandez cath  -BF     Row Name 06/10/22 1418          Self-Feeding    Comment (Self-Feeding) Independent  -BF     Row Name 06/10/22 1418          Transfers    Bed-Chair [place] (Transfers) minimum assist (75% patient effort);nonverbal cues (demo/gesture);verbal cues  -     Assistive Device (Bed-Chair Transfers) wheelchair  -BF     Row Name 06/10/22 1418          Motor Skills    Motor Control/Coordination Interventions gross motor coordination activities;therapeutic exercise/ROM  BUE GMC therex, strengthening; BUE rickshaw 30 lbs X20X5, BUE PRE 30 mins, flexbar therex, pushbox therex  -     Row Name 06/10/22 1418          Positioning and Restraints    In Wheelchair sitting;call light within reach;encouraged to call for assist  In PM  -BF           User Key  (r) = Recorded By, (t) = Taken By, (c) = Cosigned By    Initials Name Effective Dates    Mandi Saleem OT 06/16/21 -                  Occupational Therapy Education                 Title: PT OT SLP Therapies (Done)     Topic: Occupational Therapy (Done)     Point: ADL training (Done)     Description:   Instruct learner(s) on proper safety adaptation and remediation techniques during self care or transfers.   Instruct in proper use of assistive devices.              Learning Progress Summary           Patient Acceptance, E, VU,NR by BF at 6/10/2022 1417      Show all documentation for this point (7)                 Point: Precautions (Done)     Description:   Instruct learner(s) on prescribed precautions during  self-care and functional transfers.              Learning Progress Summary           Patient Acceptance, E, VU,NR by  at 6/10/2022 1417      Show all documentation for this point (7)                             User Key     Initials Effective Dates Name Provider Type Discipline     06/16/21 -  Mandi Smith OT Occupational Therapist OT                    OT Recommendation and Plan    Planned Therapy Interventions (OT): activity tolerance training, adaptive equipment training, BADL retraining, ROM/therapeutic exercise, strengthening exercise, transfer/mobility retraining                    Time Calculation:      Time Calculation- OT     Row Name 06/10/22 1422 06/10/22 0915          Time Calculation- OT    OT Start Time 1330  -BF 0915  -BF     OT Stop Time 1415  -BF 1000  -BF     OT Time Calculation (min) 45 min  -BF 45 min  -BF     Total Timed Code Minutes- OT 45 minute(s)  -BF 45 minute(s)  -BF     OT Non-Billable Time (min) -- 10 min  -BF           User Key  (r) = Recorded By, (t) = Taken By, (c) = Cosigned By    Initials Name Provider Type     Mandi Smith OT Occupational Therapist              Therapy Charges for Today     Code Description Service Date Service Provider Modifiers Qty    32669751414 HC OT SELF CARE/MGMT/TRAIN EA 15 MIN 6/10/2022 Mandi Smith OT GO 1    16941835631 HC OT THERAPEUTIC ACT EA 15 MIN 6/10/2022 Mandi Smith OT GO 2    54374323293 HC OT THER PROC EA 15 MIN 6/10/2022 Mandi Smith OT GO 3                   Mandi Smith OT  6/10/2022

## 2022-06-11 LAB
GLUCOSE BLDC GLUCOMTR-MCNC: 108 MG/DL (ref 70–130)
GLUCOSE BLDC GLUCOMTR-MCNC: 142 MG/DL (ref 70–130)
GLUCOSE BLDC GLUCOMTR-MCNC: 96 MG/DL (ref 70–130)
PF4 HEPARIN CMPLX IGG SERPL IA: 0.16 OD (ref 0–0.4)

## 2022-06-11 PROCEDURE — 25010000002 VANCOMYCIN 5 G RECONSTITUTED SOLUTION: Performed by: INTERNAL MEDICINE

## 2022-06-11 PROCEDURE — 82962 GLUCOSE BLOOD TEST: CPT

## 2022-06-11 PROCEDURE — 63710000001 INSULIN ASPART PER 5 UNITS: Performed by: FAMILY MEDICINE

## 2022-06-11 PROCEDURE — 63710000001 INSULIN DETEMIR PER 5 UNITS: Performed by: INTERNAL MEDICINE

## 2022-06-11 RX ADMIN — INSULIN ASPART 15 UNITS: 100 INJECTION, SOLUTION INTRAVENOUS; SUBCUTANEOUS at 09:10

## 2022-06-11 RX ADMIN — FUROSEMIDE 40 MG: 40 TABLET ORAL at 17:37

## 2022-06-11 RX ADMIN — NADOLOL 20 MG: 40 TABLET ORAL at 09:08

## 2022-06-11 RX ADMIN — Medication 10 ML: at 09:07

## 2022-06-11 RX ADMIN — DOCUSATE SODIUM 50 MG AND SENNOSIDES 8.6 MG 2 TABLET: 8.6; 5 TABLET, FILM COATED ORAL at 09:08

## 2022-06-11 RX ADMIN — ATORVASTATIN CALCIUM 40 MG: 40 TABLET, FILM COATED ORAL at 21:09

## 2022-06-11 RX ADMIN — Medication 150 MG: at 09:07

## 2022-06-11 RX ADMIN — INSULIN DETEMIR 20 UNITS: 100 INJECTION, SOLUTION SUBCUTANEOUS at 09:11

## 2022-06-11 RX ADMIN — Medication 10 ML: at 21:00

## 2022-06-11 RX ADMIN — SPIRONOLACTONE 50 MG: 25 TABLET ORAL at 09:07

## 2022-06-11 RX ADMIN — Medication 10 ML: at 22:13

## 2022-06-11 RX ADMIN — METFORMIN HYDROCHLORIDE 500 MG: 500 TABLET ORAL at 09:08

## 2022-06-11 RX ADMIN — Medication 1 TABLET: at 09:08

## 2022-06-11 RX ADMIN — METFORMIN HYDROCHLORIDE 500 MG: 500 TABLET ORAL at 17:37

## 2022-06-11 RX ADMIN — FUROSEMIDE 40 MG: 40 TABLET ORAL at 09:07

## 2022-06-11 RX ADMIN — VANCOMYCIN HYDROCHLORIDE 1500 MG: 5 INJECTION, POWDER, LYOPHILIZED, FOR SOLUTION INTRAVENOUS at 09:07

## 2022-06-11 RX ADMIN — ASPIRIN 81 MG: 81 TABLET, COATED ORAL at 09:08

## 2022-06-11 RX ADMIN — PANTOPRAZOLE SODIUM 40 MG: 40 TABLET, DELAYED RELEASE ORAL at 06:04

## 2022-06-11 RX ADMIN — INSULIN ASPART 15 UNITS: 100 INJECTION, SOLUTION INTRAVENOUS; SUBCUTANEOUS at 12:33

## 2022-06-11 RX ADMIN — INSULIN ASPART 15 UNITS: 100 INJECTION, SOLUTION INTRAVENOUS; SUBCUTANEOUS at 17:37

## 2022-06-11 RX ADMIN — VANCOMYCIN HYDROCHLORIDE 1500 MG: 5 INJECTION, POWDER, LYOPHILIZED, FOR SOLUTION INTRAVENOUS at 21:05

## 2022-06-11 RX ADMIN — INSULIN DETEMIR 20 UNITS: 100 INJECTION, SOLUTION SUBCUTANEOUS at 21:00

## 2022-06-11 RX ADMIN — NYSTATIN 1 APPLICATION: 100000 POWDER TOPICAL at 09:11

## 2022-06-11 NOTE — PLAN OF CARE
Goal Outcome Evaluation:   Progressing with all therapies.  Continue with current POC.

## 2022-06-11 NOTE — PLAN OF CARE
Problem: Rehabilitation (IRF) Plan of Care  Goal: Plan of Care Review  Outcome: Ongoing, Progressing  Flowsheets (Taken 6/11/2022 0603)  Progress: improving  Plan of Care Reviewed With: patient  Outcome Evaluation:   resting well throughout the night   continue poc  Goal: Patient-Specific Goal (Individualized)  Outcome: Ongoing, Progressing  Goal: Absence of New-Onset Illness or Injury  Outcome: Ongoing, Progressing  Intervention: Prevent Fall and Fall Injury  Recent Flowsheet Documentation  Taken 6/11/2022 0602 by Caitlyn Mota, RN  Safety Promotion/Fall Prevention:   room organization consistent   safety round/check completed  Taken 6/11/2022 0400 by Caitlyn Mota, RN  Safety Promotion/Fall Prevention:   room organization consistent   safety round/check completed   nonskid shoes/slippers when out of bed  Taken 6/11/2022 0226 by Caitlyn Mota, RN  Safety Promotion/Fall Prevention:   safety round/check completed   room organization consistent   nonskid shoes/slippers when out of bed  Taken 6/11/2022 0009 by Caitlyn Mota, RN  Safety Promotion/Fall Prevention:   room organization consistent   safety round/check completed   nonskid shoes/slippers when out of bed  Taken 6/10/2022 2200 by Caitlyn Mota, RN  Safety Promotion/Fall Prevention:   clutter free environment maintained   safety round/check completed   nonskid shoes/slippers when out of bed  Taken 6/10/2022 2000 by Caitlyn Mota, RN  Safety Promotion/Fall Prevention:   safety round/check completed   nonskid shoes/slippers when out of bed   clutter free environment maintained  Goal: Optimal Comfort and Wellbeing  Outcome: Ongoing, Progressing  Goal: Home and Community Transition Plan Established  Outcome: Ongoing, Progressing   Goal Outcome Evaluation:  Plan of Care Reviewed With: patient        Progress: improving  Outcome Evaluation: resting well throughout the night; continue poc

## 2022-06-12 LAB
GLUCOSE BLDC GLUCOMTR-MCNC: 110 MG/DL (ref 70–130)
GLUCOSE BLDC GLUCOMTR-MCNC: 170 MG/DL (ref 70–130)
GLUCOSE BLDC GLUCOMTR-MCNC: 183 MG/DL (ref 70–130)
GLUCOSE BLDC GLUCOMTR-MCNC: 186 MG/DL (ref 70–130)
GLUCOSE BLDC GLUCOMTR-MCNC: 226 MG/DL (ref 70–130)
GLUCOSE BLDC GLUCOMTR-MCNC: 76 MG/DL (ref 70–130)
VANCOMYCIN TROUGH SERPL-MCNC: 18.9 MCG/ML (ref 5–20)

## 2022-06-12 PROCEDURE — 25010000002 VANCOMYCIN 5 G RECONSTITUTED SOLUTION: Performed by: INTERNAL MEDICINE

## 2022-06-12 PROCEDURE — 63710000001 INSULIN ASPART PER 5 UNITS: Performed by: FAMILY MEDICINE

## 2022-06-12 PROCEDURE — 82962 GLUCOSE BLOOD TEST: CPT

## 2022-06-12 PROCEDURE — 63710000001 INSULIN DETEMIR PER 5 UNITS: Performed by: INTERNAL MEDICINE

## 2022-06-12 PROCEDURE — 80202 ASSAY OF VANCOMYCIN: CPT

## 2022-06-12 RX ADMIN — ASPIRIN 81 MG: 81 TABLET, COATED ORAL at 09:15

## 2022-06-12 RX ADMIN — METFORMIN HYDROCHLORIDE 500 MG: 500 TABLET ORAL at 17:26

## 2022-06-12 RX ADMIN — Medication 10 ML: at 21:15

## 2022-06-12 RX ADMIN — INSULIN DETEMIR 20 UNITS: 100 INJECTION, SOLUTION SUBCUTANEOUS at 09:17

## 2022-06-12 RX ADMIN — INSULIN ASPART 2 UNITS: 100 INJECTION, SOLUTION INTRAVENOUS; SUBCUTANEOUS at 17:25

## 2022-06-12 RX ADMIN — INSULIN ASPART 15 UNITS: 100 INJECTION, SOLUTION INTRAVENOUS; SUBCUTANEOUS at 12:23

## 2022-06-12 RX ADMIN — Medication 10 ML: at 09:16

## 2022-06-12 RX ADMIN — ATORVASTATIN CALCIUM 40 MG: 40 TABLET, FILM COATED ORAL at 21:08

## 2022-06-12 RX ADMIN — INSULIN ASPART 2 UNITS: 100 INJECTION, SOLUTION INTRAVENOUS; SUBCUTANEOUS at 12:23

## 2022-06-12 RX ADMIN — NYSTATIN 1 APPLICATION: 100000 POWDER TOPICAL at 09:16

## 2022-06-12 RX ADMIN — Medication 150 MG: at 09:15

## 2022-06-12 RX ADMIN — Medication 10 ML: at 21:14

## 2022-06-12 RX ADMIN — INSULIN DETEMIR 20 UNITS: 100 INJECTION, SOLUTION SUBCUTANEOUS at 21:00

## 2022-06-12 RX ADMIN — VANCOMYCIN HYDROCHLORIDE 1500 MG: 5 INJECTION, POWDER, LYOPHILIZED, FOR SOLUTION INTRAVENOUS at 09:15

## 2022-06-12 RX ADMIN — NYSTATIN: 100000 POWDER TOPICAL at 21:14

## 2022-06-12 RX ADMIN — VANCOMYCIN HYDROCHLORIDE 1500 MG: 5 INJECTION, POWDER, LYOPHILIZED, FOR SOLUTION INTRAVENOUS at 21:07

## 2022-06-12 RX ADMIN — PANTOPRAZOLE SODIUM 40 MG: 40 TABLET, DELAYED RELEASE ORAL at 05:45

## 2022-06-12 RX ADMIN — FUROSEMIDE 40 MG: 40 TABLET ORAL at 17:26

## 2022-06-12 RX ADMIN — METFORMIN HYDROCHLORIDE 500 MG: 500 TABLET ORAL at 09:15

## 2022-06-12 RX ADMIN — INSULIN ASPART 15 UNITS: 100 INJECTION, SOLUTION INTRAVENOUS; SUBCUTANEOUS at 17:25

## 2022-06-12 RX ADMIN — Medication 1 TABLET: at 09:15

## 2022-06-12 NOTE — PLAN OF CARE
Problem: Rehabilitation (IRF) Plan of Care  Goal: Plan of Care Review  Outcome: Ongoing, Progressing  Flowsheets  Taken 6/12/2022 0457  Plan of Care Reviewed With: patient  Taken 6/11/2022 0603  Progress: improving  Outcome Evaluation:   resting well throughout the night   continue poc  Goal: Patient-Specific Goal (Individualized)  Outcome: Ongoing, Progressing  Goal: Absence of New-Onset Illness or Injury  Outcome: Ongoing, Progressing  Intervention: Prevent Fall and Fall Injury  Recent Flowsheet Documentation  Taken 6/12/2022 0400 by Caitlyn Moat RN  Safety Promotion/Fall Prevention:   room organization consistent   safety round/check completed  Taken 6/12/2022 0223 by Caitlyn Mota RN  Safety Promotion/Fall Prevention:   room organization consistent   safety round/check completed  Taken 6/12/2022 0000 by Caitlyn Mota, RN  Safety Promotion/Fall Prevention:   safety round/check completed   clutter free environment maintained  Taken 6/11/2022 2200 by Caitlyn Mota RN  Safety Promotion/Fall Prevention:   clutter free environment maintained   safety round/check completed  Taken 6/11/2022 2000 by Caitlyn Mota, RN  Safety Promotion/Fall Prevention:   safety round/check completed   nonskid shoes/slippers when out of bed  Goal: Optimal Comfort and Wellbeing  Outcome: Ongoing, Progressing  Goal: Home and Community Transition Plan Established  Outcome: Ongoing, Progressing   Goal Outcome Evaluation:  Plan of Care Reviewed With: patient        Progress: improving  Outcome Evaluation: resting well throughout the night; continue poc

## 2022-06-12 NOTE — PROGRESS NOTES
Rehabilitation Nursing  Inpatient Rehabilitation Plan of Care Note    Plan of Care  Pain    Pain Management (Active)  Current Status (6/2/2022 4:40:00 PM): potential for increased pain  Weekly Goal: pain at a tolerable level  Discharge Goal: no pain    Body Function Structure    Skin Integrity (Active)  Current Status (6/2/2022 4:40:00 PM): impaired skin integrity  Weekly Goal: improved skin integrity  Discharge Goal: wound healing    Safety    Potential for Injury (Active)  Current Status (6/2/2022 4:40:00 PM): potential for falls  Weekly Goal: no falls  Discharge Goal: no falls    Signed by: Ning Albert RN

## 2022-06-13 LAB
CYTOLOGIST CVX/VAG CYTO: NORMAL
GLUCOSE BLDC GLUCOMTR-MCNC: 108 MG/DL (ref 70–130)
GLUCOSE BLDC GLUCOMTR-MCNC: 129 MG/DL (ref 70–130)
GLUCOSE BLDC GLUCOMTR-MCNC: 195 MG/DL (ref 70–130)
GLUCOSE BLDC GLUCOMTR-MCNC: 235 MG/DL (ref 70–130)
PATH INTERP BLD-IMP: NORMAL

## 2022-06-13 PROCEDURE — 97110 THERAPEUTIC EXERCISES: CPT | Performed by: OCCUPATIONAL THERAPIST

## 2022-06-13 PROCEDURE — 63710000001 INSULIN DETEMIR PER 5 UNITS: Performed by: INTERNAL MEDICINE

## 2022-06-13 PROCEDURE — 99231 SBSQ HOSP IP/OBS SF/LOW 25: CPT | Performed by: NURSE PRACTITIONER

## 2022-06-13 PROCEDURE — 97530 THERAPEUTIC ACTIVITIES: CPT | Performed by: OCCUPATIONAL THERAPIST

## 2022-06-13 PROCEDURE — 97110 THERAPEUTIC EXERCISES: CPT

## 2022-06-13 PROCEDURE — 82962 GLUCOSE BLOOD TEST: CPT

## 2022-06-13 PROCEDURE — 97530 THERAPEUTIC ACTIVITIES: CPT

## 2022-06-13 PROCEDURE — 99231 SBSQ HOSP IP/OBS SF/LOW 25: CPT | Performed by: FAMILY MEDICINE

## 2022-06-13 PROCEDURE — 97535 SELF CARE MNGMENT TRAINING: CPT | Performed by: OCCUPATIONAL THERAPIST

## 2022-06-13 PROCEDURE — 25010000002 VANCOMYCIN 5 G RECONSTITUTED SOLUTION: Performed by: INTERNAL MEDICINE

## 2022-06-13 PROCEDURE — 97116 GAIT TRAINING THERAPY: CPT

## 2022-06-13 PROCEDURE — 63710000001 INSULIN ASPART PER 5 UNITS: Performed by: FAMILY MEDICINE

## 2022-06-13 RX ORDER — EMOLLIENT COMBINATION NO.92
LOTION (ML) TOPICAL 2 TIMES DAILY
Status: DISCONTINUED | OUTPATIENT
Start: 2022-06-13 | End: 2022-06-16 | Stop reason: HOSPADM

## 2022-06-13 RX ADMIN — INSULIN ASPART 15 UNITS: 100 INJECTION, SOLUTION INTRAVENOUS; SUBCUTANEOUS at 11:58

## 2022-06-13 RX ADMIN — Medication 10 ML: at 21:00

## 2022-06-13 RX ADMIN — SPIRONOLACTONE 50 MG: 25 TABLET ORAL at 09:52

## 2022-06-13 RX ADMIN — ATORVASTATIN CALCIUM 40 MG: 40 TABLET, FILM COATED ORAL at 21:00

## 2022-06-13 RX ADMIN — Medication 10 ML: at 21:07

## 2022-06-13 RX ADMIN — VANCOMYCIN HYDROCHLORIDE 1500 MG: 5 INJECTION, POWDER, LYOPHILIZED, FOR SOLUTION INTRAVENOUS at 08:41

## 2022-06-13 RX ADMIN — Medication 10 ML: at 09:52

## 2022-06-13 RX ADMIN — NYSTATIN 1 APPLICATION: 100000 POWDER TOPICAL at 08:43

## 2022-06-13 RX ADMIN — Medication 150 MG: at 08:41

## 2022-06-13 RX ADMIN — INSULIN ASPART 3 UNITS: 100 INJECTION, SOLUTION INTRAVENOUS; SUBCUTANEOUS at 11:58

## 2022-06-13 RX ADMIN — INSULIN DETEMIR 20 UNITS: 100 INJECTION, SOLUTION SUBCUTANEOUS at 08:44

## 2022-06-13 RX ADMIN — ASPIRIN 81 MG: 81 TABLET, COATED ORAL at 08:39

## 2022-06-13 RX ADMIN — Medication 1 TABLET: at 08:39

## 2022-06-13 RX ADMIN — FUROSEMIDE 40 MG: 40 TABLET ORAL at 17:19

## 2022-06-13 RX ADMIN — Medication: at 21:00

## 2022-06-13 RX ADMIN — NYSTATIN: 100000 POWDER TOPICAL at 21:01

## 2022-06-13 RX ADMIN — METFORMIN HYDROCHLORIDE 500 MG: 500 TABLET ORAL at 08:39

## 2022-06-13 RX ADMIN — Medication: at 13:56

## 2022-06-13 RX ADMIN — INSULIN DETEMIR 20 UNITS: 100 INJECTION, SOLUTION SUBCUTANEOUS at 21:02

## 2022-06-13 RX ADMIN — Medication 10 ML: at 08:42

## 2022-06-13 RX ADMIN — VANCOMYCIN HYDROCHLORIDE 1500 MG: 5 INJECTION, POWDER, LYOPHILIZED, FOR SOLUTION INTRAVENOUS at 21:07

## 2022-06-13 RX ADMIN — FUROSEMIDE 40 MG: 40 TABLET ORAL at 08:39

## 2022-06-13 RX ADMIN — INSULIN ASPART 15 UNITS: 100 INJECTION, SOLUTION INTRAVENOUS; SUBCUTANEOUS at 08:43

## 2022-06-13 RX ADMIN — Medication 10 MG: at 21:02

## 2022-06-13 RX ADMIN — INSULIN ASPART 2 UNITS: 100 INJECTION, SOLUTION INTRAVENOUS; SUBCUTANEOUS at 08:40

## 2022-06-13 RX ADMIN — METFORMIN HYDROCHLORIDE 500 MG: 500 TABLET ORAL at 17:19

## 2022-06-13 RX ADMIN — NADOLOL 20 MG: 40 TABLET ORAL at 08:39

## 2022-06-13 RX ADMIN — PANTOPRAZOLE SODIUM 40 MG: 40 TABLET, DELAYED RELEASE ORAL at 05:48

## 2022-06-13 NOTE — PROGRESS NOTES
Rehabilitation Nursing  Inpatient Rehabilitation Plan of Care Note    Plan of Care  Pain    Pain Management (Active)  Current Status (6/2/2022 4:40:00 PM): potential for increased pain  Weekly Goal: pain at a tolerable level  Discharge Goal: no pain    Body Function Structure    Skin Integrity (Active)  Current Status (6/2/2022 4:40:00 PM): impaired skin integrity  Weekly Goal: improved skin integrity  Discharge Goal: wound healing    Safety    Potential for Injury (Active)  Current Status (6/2/2022 4:40:00 PM): potential for falls  Weekly Goal: no falls  Discharge Goal: no falls    Signed by: Caitlyn Mota RN

## 2022-06-13 NOTE — THERAPY TREATMENT NOTE
Inpatient Rehabilitation - Occupational Therapy Treatment Note     [facility]     Patient Name: Melchor Hardwick  : 1965  MRN: [id number]    Today's Date: 2022                 Admit Date: 2022       No diagnosis found.    Patient Active Problem List   Diagnosis   • Hyperlipidemia   • Hypertensive disorder   • Kidney disease   • Obesity   • Type 2 diabetes mellitus (HCC)   • Gas gangrene of foot (HCC)   • Cellulitis in diabetic foot (HCC)   • Other acute osteomyelitis of left foot (HCC)   • Osteomyelitis (HCC)   • Critical illness myopathy       Past Medical History:   Diagnosis Date   • Diabetes mellitus (HCC)    • Elevated cholesterol    • Hyperlipidemia    • Hypertension        Past Surgical History:   Procedure Laterality Date   • ABSCESS DRAINAGE      PERINEUM   • AMPUTATION FOOT Left 2022    Procedure: AMPUTATION FOOT;  Surgeon: Addison Colby MD;  Location: [facility];  Service: Podiatry;  Laterality: Left;   • INCISION AND DRAINAGE LEG Left 05/10/2022    Procedure: INCISION AND DRAINAGE LOWER EXTREMITY;  Surgeon: Addison Colby MD;  Location: [facility];  Service: Podiatry;  Laterality: Left;   • WOUND DEBRIDEMENT Left 2022    Procedure: DEBRIDEMENT FOOT;  Surgeon: Addison Colby MD;  Location: [facility];  Service: Podiatry;  Laterality: Left;             IRF OT ASSESSMENT FLOWSHEET (last 12 hours)     IRF OT Evaluation and Treatment     Row Name 22 1156          OT Time and Intention    Document Type daily treatment  -BF     Mode of Treatment occupational therapy  -BF     Patient Effort good  -BF     Symptoms Noted During/After Treatment none  -BF     Row Name 22 1156          General Information    Existing Precautions/Restrictions fall;non-weight bearing;left  <skin integrity L foot  -BF     Row Name 22 1156          Cognition/Psychosocial    Orientation Status (Cognition) oriented x 4  -BF     Follows Commands (Cognition) WFL  -BF     Row Name 22 1156           Grooming    Bertie Level (Grooming) modified independence  -     Position (Grooming) supported sitting  -     Row Name 06/13/22 1156          Motor Skills    Motor Control/Coordination Interventions gross motor coordination activities;fine motor manipulation/dexterity activities;therapeutic exercise/ROM  BUE GMC/FMC theract, strengthening; rickshaw 30 lbs X20X6; BUE PRE 30 mins, flexbar therex  -     Row Name 06/13/22 1156          Positioning and Restraints    In Wheelchair sitting;call light within reach;encouraged to call for assist  after both sessions  -           User Key  (r) = Recorded By, (t) = Taken By, (c) = Cosigned By    Initials Name Effective Dates     Mandi Smith OT 06/16/21 -                  Occupational Therapy Education                 Title: PT OT SLP Therapies (Done)     Topic: Occupational Therapy (Done)     Point: ADL training (Done)     Description:   Instruct learner(s) on proper safety adaptation and remediation techniques during self care or transfers.   Instruct in proper use of assistive devices.              Learning Progress Summary           Patient Acceptance, E, VU,NR by  at 6/13/2022 1155      Show all documentation for this point (9)                 Point: Precautions (Done)     Description:   Instruct learner(s) on prescribed precautions during self-care and functional transfers.              Learning Progress Summary           Patient Acceptance, E, VU,NR by  at 6/13/2022 1155      Show all documentation for this point (9)                             User Key     Initials Effective Dates Name Provider Type Discipline     06/16/21 -  Mandi Smith OT Occupational Therapist OT                    OT Recommendation and Plan    Planned Therapy Interventions (OT): activity tolerance training, adaptive equipment training, BADL retraining, ROM/therapeutic exercise, strengthening exercise, transfer/mobility retraining                     Time Calculation:      Time Calculation- OT     Row Name 06/13/22 1204 06/13/22 0915          Time Calculation- OT    OT Start Time 1045  -BF 0915  -BF     OT Stop Time 1130  -BF 1000  -BF     OT Time Calculation (min) 45 min  -BF 45 min  -BF     Total Timed Code Minutes- OT 45 minute(s)  -BF 45 minute(s)  -BF     OT Non-Billable Time (min) -- 10 min  -BF           User Key  (r) = Recorded By, (t) = Taken By, (c) = Cosigned By    Initials Name Provider Type    BF Mandi Smith OT Occupational Therapist              Therapy Charges for Today     Code Description Service Date Service Provider Modifiers Qty    34644122448 HC OT SELF CARE/MGMT/TRAIN EA 15 MIN 6/13/2022 Mandi Smith OT GO 1    66309311758 HC OT THERAPEUTIC ACT EA 15 MIN 6/13/2022 Mandi Smith OT GO 2    39378874443 HC OT THER PROC EA 15 MIN 6/13/2022 Mandi Smith OT GO 3                   Mandi Smith OT  6/13/2022

## 2022-06-13 NOTE — THERAPY TREATMENT NOTE
Inpatient Rehabilitation - Physical Therapy Treatment Note        [place]     Patient Name: Melchor Hardwick  : 1965  MRN: [id number]    Today's Date: 2022                    Admit Date: 2022      Visit Dx:   No diagnosis found.    Patient Active Problem List   Diagnosis   • Hyperlipidemia   • Hypertensive disorder   • Kidney disease   • Obesity   • Type 2 diabetes mellitus (HCC)   • Gas gangrene of foot (HCC)   • Cellulitis in diabetic foot (HCC)   • Other acute osteomyelitis of left foot (HCC)   • Osteomyelitis (HCC)   • Critical illness myopathy       Past Medical History:   Diagnosis Date   • Diabetes mellitus (HCC)    • Elevated cholesterol    • Hyperlipidemia    • Hypertension        Past Surgical History:   Procedure Laterality Date   • ABSCESS DRAINAGE      PERINEUM   • AMPUTATION FOOT Left 2022    Procedure: AMPUTATION FOOT;  Surgeon: Addison Colby MD;  Location: [facility];  Service: Podiatry;  Laterality: Left;   • INCISION AND DRAINAGE LEG Left 05/10/2022    Procedure: INCISION AND DRAINAGE LOWER EXTREMITY;  Surgeon: Addison Colby MD;  Location: [facility];  Service: Podiatry;  Laterality: Left;   • WOUND DEBRIDEMENT Left 2022    Procedure: DEBRIDEMENT FOOT;  Surgeon: Addison Colby MD;  Location: [facility];  Service: Podiatry;  Laterality: Left;       PT ASSESSMENT (last 12 hours)     IRF PT Evaluation and Treatment     Row Name 22 1434          PT Time and Intention    Document Type daily treatment  am session  -RG     Mode of Treatment individual therapy;physical therapy  -RG     Patient/Family/Caregiver Comments/Observations Pt and nursing in agreement for skilled PT on this date.  -RG     Row Name 22 1434          General Information    Patient Profile Reviewed yes  -RG     Existing Precautions/Restrictions fall;non-weight bearing  hernandez, <skin integrity L foot  -RG     Row Name 22 1434          Pain Scale: FACES Pre/Post-Treatment    Pain: FACES  Scale, Pretreatment 6-->hurts even more  -RG     Posttreatment Pain Rating 6-->hurts even more  -RG     Row Name 06/13/22 1434          Cognition/Psychosocial    Affect/Mental Status (Cognition) WFL  -RG     Follows Commands (Cognition) WFL  -RG     Personal Safety Interventions gait belt;nonskid shoes/slippers when out of bed;fall prevention program maintained  -[facility] Name 06/13/22 1434          Mobility    Left Lower Extremity (Weight-bearing Status) non weight-bearing (NWB)  -RG     Right Lower Extremity (Weight-bearing Status) weight-bearing as tolerated (WBAT)  -[facility] Name 06/13/22 1434          Bed Mobility    Supine-Sit [place] (Bed Mobility) contact guard;nonverbal cues (demo/gesture);verbal cues  -[facility] Name 06/13/22 1434          Transfers    Bed-Chair [place] (Transfers) minimum assist (75% patient effort);verbal cues;nonverbal cues (demo/gesture)  -RG     Chair-Bed [place] (Transfers) minimum assist (75% patient effort);verbal cues;nonverbal cues (demo/gesture)  -RG     Assistive Device (Bed-Chair Transfers) wheelchair;walker, front-wheeled  -RG     Sit-Stand [place] (Transfers) verbal cues;nonverbal cues (demo/gesture);contact guard  -RG     Stand-Sit [place] (Transfers) verbal cues;nonverbal cues (demo/gesture);contact guard  -[facility] Name 06/13/22 1434          Chair-Bed Transfer    Assistive Device (Chair-Bed Transfers) wheelchair  -[facility] Name 06/13/22 1434          Sit-Stand Transfer    Assistive Device (Sit-Stand Transfers) walker, front-wheeled;parallel bars  -[facility] Name 06/13/22 1434          Stand-Sit Transfer    Assistive Device (Stand-Sit Transfers) wheelchair;walker, front-wheeled;parallel bars  -[facility] Name 06/13/22 1434          Gait/Stairs (Locomotion)    [place] Level (Gait) minimum assist (75% patient effort);2 person assist  -RG     Assistive Device (Gait) walker, front-wheeled  -     Distance in Feet (Gait) 20 x 3  -RG      Pattern (Gait) --  hop to  -RG     Comment, (Gait/Stairs) Pt c/o fatigue with ambulation and requried frequent rest breaks.  -     Row Name 06/13/22 1434          Hip (Therapeutic Exercise)    Hip Strengthening (Therapeutic Exercise) bilateral;flexion;aBduction;aDduction;marching while seated;marching while standing;sitting;standing;resistance band;green;10 repetitions;2 sets  -RG     Row Name 06/13/22 1434          Knee (Therapeutic Exercise)    Knee Strengthening (Therapeutic Exercise) bilateral;flexion;extension;marching while seated;marching while standing;LAQ (long arc quad);hamstring curls;sitting;standing;resistance band;green;10 repetitions;2 sets  -RG     Row Name 06/13/22 1434          Ankle (Therapeutic Exercise)    Ankle Strengthening (Therapeutic Exercise) bilateral;dorsiflexion;plantarflexion;sitting;10 repetitions;2 sets  -     Row Name 06/13/22 1434          Positioning and Restraints    Pre-Treatment Position in bed  -RG     Post Treatment Position wheelchair  -RG     In Wheelchair notified nsg;sitting;call light within reach;encouraged to call for assist;with nsg  -     Row Name 06/13/22 1434          Therapy Assessment/Plan (PT)    Patient's Goals For Discharge return home  -     Row Name 06/13/22 1434          Therapy Assessment/Plan (PT)    Rehab Potential/Prognosis (PT) adequate, monitor progress closely  -RG     Frequency of Treatment (PT) 5 times per week  -RG     Estimated Duration of Therapy (PT) 1 week;2 weeks  -RG     Problem List (PT) balance;mobility;range of motion (ROM);strength;pain  -RG     Activity Limitations Related to Problem List (PT) unable to ambulate safely;unable to transfer safely  -RG     Row Name 06/13/22 1434          Therapy Plan Review/Discharge Plan (PT)    Anticipated Equipment Needs at Discharge (PT Eval) --  tbd  -RG     Anticipated Discharge Disposition (PT) home with home health  -[facility] Name 06/13/22 1434          IRF PT Goals    Bed Mobility Goal  Selection (PT-IRF) bed mobility, PT goal 1  -RG     Transfer Goal Selection (PT-IRF) transfers, PT goal 1  -RG     Gait (Walking Locomotion) Goal Selection (PT-IRF) gait, PT goal 1  -RG     Row Name 06/13/22 1434          Bed Mobility Goal 1 (PT-IRF)    Activity/Assistive Device (Bed Mobility Goal 1, PT-IRF) sit to supine/supine to sit  -RG     [place] Level (Bed Mobility Goal 1, PT-IRF) modified independence  -RG     Time Frame (Bed Mobility Goal 1, PT-IRF) by discharge  -RG     Progress/Outcomes (Bed Mobility Goal 1, PT-IRF) goal ongoing  -RG     Row Name 06/13/22 1434          Transfer Goal 1 (PT-IRF)    Activity/Assistive Device (Transfer Goal 1, PT-IRF) sit-to-stand/stand-to-sit;bed-to-chair/chair-to-bed  -RG     [place] Level (Transfer Goal 1, PT-IRF) supervision required  -RG     Time Frame (Transfer Goal 1, PT-IRF) by discharge  -RG     Progress/Outcomes (Transfer Goal 1, PT-IRF) goal ongoing  -RG     Row Name 06/13/22 1434          Gait/Walking Locomotion Goal 1 (PT-IRF)    Activity/Assistive Device (Gait/Walking Locomotion Goal 1, PT-IRF) walker, rolling  -RG     Gait/Walking Locomotion Distance Goal 1 (PT-IRF) 2'  -RG     [place] Level (Gait/Walking Locomotion Goal 1, PT-IRF) supervision required  -RG     Time Frame (Gait/Walking Locomotion Goal 1, PT-IRF) by discharge  -RG     Progress/Outcomes (Gait/Walking Locomotion Goal 1, PT-IRF) goal ongoing  -RG           User Key  (r) = Recorded By, (t) = Taken By, (c) = Cosigned By    Initials Name Provider Type    RG Michi Mckeon, PTA Physical Therapist Assistant              Wound 05/16/22 1340 coccyx Pressure Injury (Active)   Dressing Appearance open to air 06/13/22 1010   Base blanchable;red 06/13/22 1010   Periwound redness 06/13/22 1010   Periwound Temperature warm 06/13/22 1010   Edges irregular 06/13/22 1010   Care, Wound barrier applied 06/13/22 1010   Dressing Care open to air 06/13/22 1010       Wound 05/18/22 1545 Left other (see  comments) foot Incision (Active)   Dressing Appearance dry;intact 06/13/22 0200   Closure Staples;Sutures 06/13/22 0200   Base dressing in place, unable to visualize 06/13/22 1010   Periwound intact 06/12/22 1740   Periwound Temperature warm 06/12/22 1740   Periwound Skin Turgor firm 06/12/22 1740   Edges rolled/closed 06/12/22 1740   Dressing Care dressing removed;dressing changed;gauze 06/13/22 0200       Wound 06/02/22 1240 scrotum MASD (Moisture associated skin damage) (Active)   Dressing Appearance open to air 06/13/22 1010   Closure Open to air 06/13/22 0200   Base red 06/13/22 1010   Care, Wound barrier applied 06/13/22 1010     Physical Therapy Education                 Title: PT OT SLP Therapies (Done)     Topic: Physical Therapy (Done)     Point: Mobility training (Done)     Learning Progress Summary           Patient Acceptance, E,D, VU,NR by  at 6/13/2022 1438      Show all documentation for this point (10)                 Point: Home exercise program (Done)     Learning Progress Summary           Patient Acceptance, E,D, VU,NR by  at 6/13/2022 1438      Show all documentation for this point (10)                 Point: Body mechanics (Done)     Learning Progress Summary           Patient Acceptance, E,D, VU,NR by  at 6/13/2022 1438      Show all documentation for this point (10)                 Point: Precautions (Done)     Learning Progress Summary           Patient Acceptance, E,D, VU,NR by  at 6/13/2022 1438      Show all documentation for this point (10)                             User Key     Initials Effective Dates Name Provider Type Discipline     06/16/21 -  Michi Mckeon PTA Physical Therapist Assistant PT                PT Recommendation and Plan    Frequency of Treatment (PT): 5 times per week  Anticipated Equipment Needs at Discharge (PT Eval):  (tbd)                  Time Calculation:      PT Charges     Row Name 06/13/22 1438             Time Calculation    Start Time 0745   -RG      Stop Time 0915  -RG      Time Calculation (min) 90 min  -RG      PT Received On 06/13/22  -RG              Time Calculation- PT    Total Timed Code Minutes- PT 90 minute(s)  -RG            User Key  (r) = Recorded By, (t) = Taken By, (c) = Cosigned By    Initials Name Provider Type    Michi White PTA Physical Therapist Assistant                Therapy Charges for Today     Code Description Service Date Service Provider Modifiers Qty    85036868735 HC GAIT TRAINING EA 15 MIN 6/13/2022 Michi Mckeon PTA GP, CQ 1    93116767889 HC PT THERAPEUTIC ACT EA 15 MIN 6/13/2022 Michi Mckeon PTA GP, CQ 2    27680035211 HC PT THER PROC EA 15 MIN 6/13/2022 Michi Mckeon PTA GP, CQ 3                   Michi Mckeon PTA  6/13/2022

## 2022-06-13 NOTE — PROGRESS NOTES
Inpatient Rehabilitation Functional Measures Assessment and Plan of Care    Plan of Care  Self Care    [OT] Dressing (Lower)(Active)  Current Status(06/13/2022): CGA  Weekly Goal(06/21/2022): SBA  Discharge Goal: SBA    Functional Measures  SHANNON Eating:  SHANNON Grooming:  SHANNON Bathing:  SHANNON Upper Body Dressing:  SHANNON Lower Body Dressing:  SHANNON Toileting:    SHANNON Bladder Management  Level of Assistance:  Frequency/Number of Accidents this Shift:    SHANNON Bowel Management  Level of Assistance:  Frequency/Number of Accidents this Shift:    SHANNON Bed/Chair/Wheelchair Transfer:  SHANNON Toilet Transfer:  SHANNON Tub/Shower Transfer:    Previously Documented Mode of Locomotion at Discharge:  SHANNON Expected Mode of Locomotion at Discharge:  SHANNON Walk/Wheelchair:  SHANNON Stairs:    SHANNON Comprehension:  SHANNON Expression:  SHANNON Social Interaction:  SHANNON Problem Solving:  SHANNON Memory:    Therapy Mode Minutes  Occupational Therapy: Individual: 90 minutes.  Physical Therapy:  Speech Language Pathology:    Discharge Functional Goals:    Signed by: Mandi Smith OT

## 2022-06-13 NOTE — PROGRESS NOTES
[facility]  PROGRESS NOTE     Patient Identification:  Name:  Melchor Hardwick  Age:  57 y.o.  Sex:  male  :  1965  MRN:  [id number]  Visit Number:  [id number]  ROOM: 109/     Primary Care Provider:  Missy Up APRN    Length of stay in inpatient status:  11    Subjective     Chief Compliant:  No chief complaint on file.      History of Presenting Illness: Patient is being treated for critical illness myopathy.  Patient is status post left midfoot amputation secondary to osteomyelitis and staphylococcal aureus bacteremia.  Patient has had issues with generalized edema, cirrhosis secondary to [place], diabetes mellitus, anemia, thrombocytopenia, hypertension, hyperlipidemia.  Patient does have Ortiz catheter in place but we will remove today and give trial.    Objective     Current Hospital Meds:aspirin, 81 mg, Oral, Daily  atorvastatin, 40 mg, Oral, Nightly  furosemide, 40 mg, Oral, BID  Insulin Aspart, 0-7 Units, Subcutaneous, TID AC  Insulin Aspart, 15 Units, Subcutaneous, TID AC  insulin detemir, 20 Units, Subcutaneous, Q12H  iron polysaccharides, 150 mg, Oral, Daily  metFORMIN, 500 mg, Oral, BID With Meals  multivitamin, 1 tablet, Oral, Daily  nadolol, 20 mg, Oral, Q24H  nystatin, , Topical, Q12H  pantoprazole, 40 mg, Oral, Q AM  senna-docusate sodium, 2 tablet, Oral, BID  sodium chloride, 10 mL, Intravenous, Q12H  sodium chloride, 10 mL, Intravenous, Q12H  spironolactone, 50 mg, Oral, Daily  vancomycin, 1,500 mg, Intravenous, Q12H       ----------------------------------------------------------------------------------------------------------------------  Vital Signs:  Temp:  [98.3 °F (36.8 °C)-98.6 °F (37 °C)] 98.3 °F (36.8 °C)  Heart Rate:  [81-93] 93  Resp:  [18] 18  BP: (110-115)/(60-70) 111/70  SpO2:  [95 %] 95 %  on   ;   Device (Oxygen Therapy): room air  Body mass index is 36.36 kg/m².    Wt Readings from Last 3 Encounters:   22 115 kg (253 lb 6.4 oz)   22 117 kg (258  lb)     Intake & Output (last 3 days)       06/10 0701 06/11 0700 06/11 0701 06/12 0700 06/12 0701 06/13 0700 06/13 0701 06/14 0700    P.O. 1800 1800 1200 360    IV Piggyback  500 500 500    Total Intake(mL/kg) 1800 (15.7) 2300 (20) 1700 (14.8) 860 (7.5)    Urine (mL/kg/hr) 6550 (2.4) 4000 (1.4) 2700 (1)     Stool 0 0 0     Total Output 6550 4000 2700     Net -4750 -1700 -1000 +860            Urine Unmeasured Occurrence  1 x      Stool Unmeasured Occurrence 1 x 2 x 1 x         Diet Regular; Cardiac, Consistent Carbohydrate, Daily Fluid Restriction; 1500 mL Fluid Per Day; High Protein  ----------------------------------------------------------------------------------------------------------------------  Physical exam:  Constitutional:   No acute distress  HEENT: Normocephalic atraumatic  Neck: Supple   Cardiovascular: Regular rate and rhythm  Pulmonary/Chest: Clear to auscultation  Abdominal: Positive bowel sounds soft.   Musculoskeletal: Status post left midfoot amputation  Neurological: No focal deficits  Skin: No rash  Peripheral vascular: 1+ edema lower extremities, scrotal edema much improved  Genitourinary:  ----------------------------------------------------------------------------------------------------------------------    Last echocardiogram:  Results for orders placed during the hospital encounter of 05/10/22    Adult Transthoracic Echo Complete W/ Cont if Necessary Per Protocol    Interpretation Summary  · Normal left ventricular cavity size and wall thickness noted. All left ventricular wall segments contract normally.  · Left ventricular ejection fraction appears to be 61 - 65%.  · The mitral valve is structurally normal with no significant stenosis present. Trace mitral valve regurgitation is present.  · The aortic valve is structurally normal with no regurgitation or stenosis present.  · There is no evidence of pericardial  effusion.    ----------------------------------------------------------------------------------------------------------------------  Results from last 7 days   Lab Units 06/10/22  0035 06/09/22  1604 06/09/22  0142   CRP mg/dL 3.08*  --  3.19*   WBC 10*3/mm3 3.60  --  3.93   HEMOGLOBIN g/dL 8.9*  --  8.8*   HEMATOCRIT % 29.5*  --  28.1*   MCV fL 81.9  --  79.4   MCHC g/dL 30.2*  --  31.3*   PLATELETS 10*3/mm3 81*  --  88*   INR   --  1.06  --          Results from last 7 days   Lab Units 06/10/22  0035 06/09/22  0142 06/06/22  1710   SODIUM mmol/L 134* 133*  --    POTASSIUM mmol/L 4.0 3.5 4.0   CHLORIDE mmol/L 100 99  --    CO2 mmol/L 24.8 25.0  --    BUN mg/dL 18 17  --    CREATININE mg/dL 0.71* 0.70*  --    CALCIUM mg/dL 7.8* 7.8*  --    GLUCOSE mg/dL 174* 159*  --    ALBUMIN g/dL 1.95* 1.87*  --    BILIRUBIN mg/dL 0.4 0.4  --    ALK PHOS U/L 295* 275*  --    AST (SGOT) U/L 84* 74*  --    ALT (SGPT) U/L 53* 48*  --    Estimated Creatinine Clearance: 145.8 mL/min (A) (by C-G formula based on SCr of 0.71 mg/dL (L)).  No results found for: AMMONIA              Glucose   Date/Time Value Ref Range Status   06/13/2022 1131 235 (H) 70 - 130 mg/dL Final     Comment:     Meter: [id number] : [id number] Penelope Eunice   06/13/2022 0625 195 (H) 70 - 130 mg/dL Final     Comment:     Meter: [id number] : [id number] [place]   06/12/2022 2008 226 (H) 70 - 130 mg/dL Final     Comment:     Meter: [id number] : [id number] [place]   06/12/2022 1608 183 (H) 70 - 130 mg/dL Final     Comment:     RN Notified Meter: [id number] : [id number] DAYNE VILLANUEVA   06/12/2022 1132 170 (H) 70 - 130 mg/dL Final     Comment:     Meter: [id number] : [id number] Penelope Dawson   06/12/2022 0907 186 (H) 70 - 130 mg/dL Final     Comment:     Meter: [id number] : [id number] Steve Yancey   06/12/2022 0610 110 70 - 130 mg/dL Final     Comment:     Meter: [id number] : [id number] Raul Wu   06/12/2022 0435 76 70  - 130 mg/dL Final     Comment:     Meter: [id number] : [id number] Marcos Culver     Lab Results   Component Value Date    TSH 4.700 (H) 05/30/2022    FREET4 0.87 (L) 05/30/2022     No results found for: PREGTESTUR, PREGSERUM, HCG, HCGQUANT  Pain Management Panel     Pain Management Panel Latest Ref Rng & Units 5/24/2022 5/11/2022    CREATININE UR mg/dL 91.0 -    AMPHETAMINES SCREEN, URINE Negative - Negative    BARBITURATES SCREEN Negative - Negative    BENZODIAZEPINE SCREEN, URINE Negative - Negative    BUPRENORPHINEUR Negative - Negative    COCAINE SCREEN, URINE Negative - Negative    METHADONE SCREEN, URINE Negative - Negative    METHAMPHETAMINEUR Negative - Negative        Brief Urine Lab Results  (Last result in the past 365 days)      Color   Clarity   Blood   Leuk Est   Nitrite   Protein   CREAT   Urine HCG        05/24/22 1839             91.0             No results found for: BLOODCX      No results found for: URINECX  No results found for: WOUNDCX  No results found for: STOOLCX  Results from last 7 days   Lab Units 06/10/22  0035 06/09/22  0142   CRP mg/dL 3.08* 3.19*       I have personally looked at the labs and they are summarized above.  ----------------------------------------------------------------------------------------------------------------------  Detailed radiology reports for the last 24 hours:    Imaging Results (Last 24 Hours)     ** No results found for the last 24 hours. **        Final impressions for the last 30 days of radiology reports:    CT Abdomen Pelvis Without Contrast    Result Date: 5/25/2022  1.  Advanced liver cirrhosis with changes of portal hypertension which include spinal megaly, prominent portosystemic collateral vessels, small-moderate volume ascites, and marked anasarca. 2.  Moderate constipation and mild generalized ileus. No bowel obstruction. 3.  Miniscule pleural effusions. 4.  Other incidental and nonacute findings detailed above.  This report was finalized  on 5/25/2022 8:48 AM by Dr. Brain Rodriguez MD.      US Abdomen Complete    Result Date: 5/20/2022  1. Splenomegaly. 2. Small volume ascites.  This report was finalized on 5/20/2022 12:59 PM by Dr. Lobo Ring MD.      US Scrotum & Testicles    Result Date: 5/20/2022  Extensive scrotal wall swelling.   This report was finalized on 5/20/2022 12:59 PM by Dr. Lobo Ring MD.      US Scrotum & Testicles    Result Date: 5/16/2022  Scrotal wall thickening  This report was finalized on 5/16/2022 4:57 PM by Dr. Lobo Ring MD.      MRI Foot Left Without Contrast    Result Date: 5/16/2022  Appearance concerning for osteomyelitis involving the 3rd metatarsal and likely the adjacent cuneiform.  This report was finalized on 5/16/2022 3:10 PM by Dr. Lobo Ring MD.      US Venous Doppler Lower Extremity Bilateral (duplex)    Result Date: 5/24/2022  No sonographic findings of DVT in the lower extremities.  This report was finalized on 5/24/2022 8:05 AM by Dr. Garry Lovelace II, MD.      XR Foreign Body For MRI    Result Date: 5/16/2022  No radiopaque foreign body identified  This report was finalized on 5/16/2022 10:20 AM by Dr. Lobo Ring MD.      I have personally looked at the radiology images and read the final radiology report.    Assessment & Plan    Critical illness myopathy--patient requiring contact-guard for bed mobility; minimum assistance for transfers; able to ambulate 20 feet x 3 with minimum assistance with front wheel walker last week.  Requiring contact-guard assistance for lower body dressing; set up for upper body dressing; contact-guard assistance for bathing; set up for grooming; maximum assistance with toileting.    History of urinary retention--we will remove Ortiz catheter today and evaluate for voiding trials.    Thrombocytopenia--likely multifactorial.  Appreciate oncology assistance.  Likely secondary to largely splenomegaly from cirrhosis.    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    Diabetes mellitus continue current treatment    Obesity    Anemia stable    Hypertension continue current treatment    VTE Prophylaxis:   Mechanical Order History:      Ordered        06/09/22 0906  Place Sequential Compression Device  Once            06/09/22 0906  Maintain Sequential Compression Device  Continuous            06/02/22 1707  Maintain Sequential Compression Device  Continuous                    Pharmalogical Order History:      Ordered     Dose Route Frequency Stop    06/02/22 1707  Enoxaparin Sodium (LOVENOX) syringe 40 mg  Status:  Discontinued         40 mg SC Nightly 06/09/22 0905                    Joe Mike MD  [facility] Hospitalist  06/13/22  11:51 EDT

## 2022-06-13 NOTE — PLAN OF CARE
Problem: Rehabilitation (IRF) Plan of Care  Goal: Plan of Care Review  Outcome: Ongoing, Progressing  Flowsheets (Taken 6/13/2022 0222)  Progress: no change  Plan of Care Reviewed With: patient  Outcome Evaluation:   pt resting well throughout the night   calm and cooperative   will continue to monitor   no acute distress noted  Goal: Patient-Specific Goal (Individualized)  Outcome: Ongoing, Progressing  Goal: Absence of New-Onset Illness or Injury  Outcome: Ongoing, Progressing  Intervention: Prevent Fall and Fall Injury  Recent Flowsheet Documentation  Taken 6/13/2022 0200 by Caitlyn Mota, RN  Safety Promotion/Fall Prevention: safety round/check completed  Taken 6/13/2022 0000 by Caitlyn Mota, RN  Safety Promotion/Fall Prevention:   clutter free environment maintained   safety round/check completed  Taken 6/12/2022 2200 by Caitlyn Mota, RN  Safety Promotion/Fall Prevention:   clutter free environment maintained   safety round/check completed  Taken 6/12/2022 2000 by Caitlyn Mota, RN  Safety Promotion/Fall Prevention: safety round/check completed  Goal: Optimal Comfort and Wellbeing  Outcome: Ongoing, Progressing  Goal: Home and Community Transition Plan Established  Outcome: Ongoing, Progressing   Goal Outcome Evaluation:  Plan of Care Reviewed With: patient        Progress: no change  Outcome Evaluation: pt resting well throughout the night; calm and cooperative; will continue to monitor; no acute distress noted

## 2022-06-14 LAB
ALBUMIN SERPL-MCNC: 1.96 G/DL (ref 3.5–5.2)
ALBUMIN/GLOB SERPL: 0.6 G/DL
ALP SERPL-CCNC: 264 U/L (ref 39–117)
ALT SERPL W P-5'-P-CCNC: 38 U/L (ref 1–41)
ANION GAP SERPL CALCULATED.3IONS-SCNC: 9.1 MMOL/L (ref 5–15)
AST SERPL-CCNC: 40 U/L (ref 1–40)
BACTERIA UR QL AUTO: ABNORMAL /HPF
BASOPHILS # BLD AUTO: 0.01 10*3/MM3 (ref 0–0.2)
BASOPHILS NFR BLD AUTO: 0.3 % (ref 0–1.5)
BILIRUB SERPL-MCNC: 0.3 MG/DL (ref 0–1.2)
BILIRUB UR QL STRIP: NEGATIVE
BUN SERPL-MCNC: 22 MG/DL (ref 6–20)
BUN/CREAT SERPL: 23.7 (ref 7–25)
CALCIUM SPEC-SCNC: 7.6 MG/DL (ref 8.6–10.5)
CHLORIDE SERPL-SCNC: 102 MMOL/L (ref 98–107)
CLARITY UR: CLEAR
CO2 SERPL-SCNC: 23.9 MMOL/L (ref 22–29)
COLOR UR: YELLOW
CREAT SERPL-MCNC: 0.93 MG/DL (ref 0.76–1.27)
DEPRECATED RDW RBC AUTO: 51.1 FL (ref 37–54)
EGFRCR SERPLBLD CKD-EPI 2021: 95.8 ML/MIN/1.73
EOSINOPHIL # BLD AUTO: 0.14 10*3/MM3 (ref 0–0.4)
EOSINOPHIL NFR BLD AUTO: 4.1 % (ref 0.3–6.2)
ERYTHROCYTE [DISTWIDTH] IN BLOOD BY AUTOMATED COUNT: 17.4 % (ref 12.3–15.4)
GLOBULIN UR ELPH-MCNC: 3.3 GM/DL
GLUCOSE BLDC GLUCOMTR-MCNC: 110 MG/DL (ref 70–130)
GLUCOSE BLDC GLUCOMTR-MCNC: 161 MG/DL (ref 70–130)
GLUCOSE BLDC GLUCOMTR-MCNC: 169 MG/DL (ref 70–130)
GLUCOSE BLDC GLUCOMTR-MCNC: 214 MG/DL (ref 70–130)
GLUCOSE BLDC GLUCOMTR-MCNC: 95 MG/DL (ref 70–130)
GLUCOSE SERPL-MCNC: 138 MG/DL (ref 65–99)
GLUCOSE UR STRIP-MCNC: NEGATIVE MG/DL
HCT VFR BLD AUTO: 28.1 % (ref 37.5–51)
HGB BLD-MCNC: 8.7 G/DL (ref 13–17.7)
HGB UR QL STRIP.AUTO: NEGATIVE
HYALINE CASTS UR QL AUTO: ABNORMAL /LPF
IMM GRANULOCYTES # BLD AUTO: 0 10*3/MM3 (ref 0–0.05)
IMM GRANULOCYTES NFR BLD AUTO: 0 % (ref 0–0.5)
KETONES UR QL STRIP: NEGATIVE
LEUKOCYTE ESTERASE UR QL STRIP.AUTO: ABNORMAL
LYMPHOCYTES # BLD AUTO: 1.45 10*3/MM3 (ref 0.7–3.1)
LYMPHOCYTES NFR BLD AUTO: 42.6 % (ref 19.6–45.3)
MCH RBC QN AUTO: 25.1 PG (ref 26.6–33)
MCHC RBC AUTO-ENTMCNC: 31 G/DL (ref 31.5–35.7)
MCV RBC AUTO: 81.2 FL (ref 79–97)
MONOCYTES # BLD AUTO: 0.32 10*3/MM3 (ref 0.1–0.9)
MONOCYTES NFR BLD AUTO: 9.4 % (ref 5–12)
NEUTROPHILS NFR BLD AUTO: 1.48 10*3/MM3 (ref 1.7–7)
NEUTROPHILS NFR BLD AUTO: 43.6 % (ref 42.7–76)
NITRITE UR QL STRIP: POSITIVE
NRBC BLD AUTO-RTO: 0 /100 WBC (ref 0–0.2)
PH UR STRIP.AUTO: 7 [PH] (ref 5–8)
PLATELET # BLD AUTO: 76 10*3/MM3 (ref 140–450)
PMV BLD AUTO: 10.4 FL (ref 6–12)
POTASSIUM SERPL-SCNC: 3.5 MMOL/L (ref 3.5–5.2)
PROT SERPL-MCNC: 5.3 G/DL (ref 6–8.5)
PROT UR QL STRIP: NEGATIVE
RBC # BLD AUTO: 3.46 10*6/MM3 (ref 4.14–5.8)
RBC # UR STRIP: ABNORMAL /HPF
REF LAB TEST METHOD: ABNORMAL
SODIUM SERPL-SCNC: 135 MMOL/L (ref 136–145)
SP GR UR STRIP: 1.01 (ref 1–1.03)
SQUAMOUS #/AREA URNS HPF: ABNORMAL /HPF
UROBILINOGEN UR QL STRIP: ABNORMAL
WBC # UR STRIP: ABNORMAL /HPF
WBC NRBC COR # BLD: 3.4 10*3/MM3 (ref 3.4–10.8)

## 2022-06-14 PROCEDURE — 87077 CULTURE AEROBIC IDENTIFY: CPT | Performed by: FAMILY MEDICINE

## 2022-06-14 PROCEDURE — 97110 THERAPEUTIC EXERCISES: CPT

## 2022-06-14 PROCEDURE — 25010000002 VANCOMYCIN 5 G RECONSTITUTED SOLUTION: Performed by: INTERNAL MEDICINE

## 2022-06-14 PROCEDURE — 87186 SC STD MICRODIL/AGAR DIL: CPT | Performed by: FAMILY MEDICINE

## 2022-06-14 PROCEDURE — 97530 THERAPEUTIC ACTIVITIES: CPT | Performed by: OCCUPATIONAL THERAPIST

## 2022-06-14 PROCEDURE — 63710000001 INSULIN ASPART PER 5 UNITS: Performed by: FAMILY MEDICINE

## 2022-06-14 PROCEDURE — 63710000001 INSULIN DETEMIR PER 5 UNITS: Performed by: INTERNAL MEDICINE

## 2022-06-14 PROCEDURE — 82962 GLUCOSE BLOOD TEST: CPT

## 2022-06-14 PROCEDURE — 85025 COMPLETE CBC W/AUTO DIFF WBC: CPT | Performed by: FAMILY MEDICINE

## 2022-06-14 PROCEDURE — 97535 SELF CARE MNGMENT TRAINING: CPT | Performed by: OCCUPATIONAL THERAPIST

## 2022-06-14 PROCEDURE — 80053 COMPREHEN METABOLIC PANEL: CPT | Performed by: FAMILY MEDICINE

## 2022-06-14 PROCEDURE — 97110 THERAPEUTIC EXERCISES: CPT | Performed by: OCCUPATIONAL THERAPIST

## 2022-06-14 PROCEDURE — 87086 URINE CULTURE/COLONY COUNT: CPT | Performed by: FAMILY MEDICINE

## 2022-06-14 PROCEDURE — 81001 URINALYSIS AUTO W/SCOPE: CPT | Performed by: FAMILY MEDICINE

## 2022-06-14 PROCEDURE — 97116 GAIT TRAINING THERAPY: CPT

## 2022-06-14 PROCEDURE — 99231 SBSQ HOSP IP/OBS SF/LOW 25: CPT | Performed by: FAMILY MEDICINE

## 2022-06-14 RX ADMIN — Medication: at 21:09

## 2022-06-14 RX ADMIN — NADOLOL 20 MG: 40 TABLET ORAL at 09:27

## 2022-06-14 RX ADMIN — METFORMIN HYDROCHLORIDE 500 MG: 500 TABLET ORAL at 17:26

## 2022-06-14 RX ADMIN — PANTOPRAZOLE SODIUM 40 MG: 40 TABLET, DELAYED RELEASE ORAL at 05:45

## 2022-06-14 RX ADMIN — INSULIN DETEMIR 20 UNITS: 100 INJECTION, SOLUTION SUBCUTANEOUS at 21:10

## 2022-06-14 RX ADMIN — INSULIN DETEMIR 20 UNITS: 100 INJECTION, SOLUTION SUBCUTANEOUS at 09:45

## 2022-06-14 RX ADMIN — SPIRONOLACTONE 50 MG: 25 TABLET ORAL at 09:24

## 2022-06-14 RX ADMIN — Medication 150 MG: at 09:23

## 2022-06-14 RX ADMIN — ASPIRIN 81 MG: 81 TABLET, COATED ORAL at 09:22

## 2022-06-14 RX ADMIN — METFORMIN HYDROCHLORIDE 500 MG: 500 TABLET ORAL at 09:23

## 2022-06-14 RX ADMIN — FUROSEMIDE 40 MG: 40 TABLET ORAL at 17:26

## 2022-06-14 RX ADMIN — NYSTATIN 1 APPLICATION: 100000 POWDER TOPICAL at 12:06

## 2022-06-14 RX ADMIN — Medication 1 TABLET: at 09:23

## 2022-06-14 RX ADMIN — Medication 10 ML: at 21:10

## 2022-06-14 RX ADMIN — INSULIN ASPART 3 UNITS: 100 INJECTION, SOLUTION INTRAVENOUS; SUBCUTANEOUS at 12:06

## 2022-06-14 RX ADMIN — FUROSEMIDE 40 MG: 40 TABLET ORAL at 09:22

## 2022-06-14 RX ADMIN — DOCUSATE SODIUM 50 MG AND SENNOSIDES 8.6 MG 2 TABLET: 8.6; 5 TABLET, FILM COATED ORAL at 21:08

## 2022-06-14 RX ADMIN — VANCOMYCIN HYDROCHLORIDE 1500 MG: 5 INJECTION, POWDER, LYOPHILIZED, FOR SOLUTION INTRAVENOUS at 21:09

## 2022-06-14 RX ADMIN — INSULIN ASPART 15 UNITS: 100 INJECTION, SOLUTION INTRAVENOUS; SUBCUTANEOUS at 12:06

## 2022-06-14 RX ADMIN — VANCOMYCIN HYDROCHLORIDE 1500 MG: 5 INJECTION, POWDER, LYOPHILIZED, FOR SOLUTION INTRAVENOUS at 09:26

## 2022-06-14 RX ADMIN — NYSTATIN: 100000 POWDER TOPICAL at 21:09

## 2022-06-14 RX ADMIN — Medication 10 MG: at 21:09

## 2022-06-14 RX ADMIN — Medication 10 ML: at 09:25

## 2022-06-14 RX ADMIN — Medication 1 APPLICATION: at 12:07

## 2022-06-14 RX ADMIN — ATORVASTATIN CALCIUM 40 MG: 40 TABLET, FILM COATED ORAL at 21:08

## 2022-06-14 RX ADMIN — Medication 10 ML: at 21:09

## 2022-06-14 NOTE — THERAPY TREATMENT NOTE
Inpatient Rehabilitation - Physical Therapy Treatment Note        [facility]     Patient Name: Melchor Hardwick  : 1965  MRN: [id number]    Today's Date: 2022                    Admit Date: 2022      Visit Dx:   No diagnosis found.    Patient Active Problem List   Diagnosis   • Hyperlipidemia   • Hypertensive disorder   • Kidney disease   • Obesity   • Type 2 diabetes mellitus (HCC)   • Gas gangrene of foot (HCC)   • Cellulitis in diabetic foot (HCC)   • Other acute osteomyelitis of left foot (HCC)   • Osteomyelitis (HCC)   • Critical illness myopathy       Past Medical History:   Diagnosis Date   • Diabetes mellitus (HCC)    • Elevated cholesterol    • Hyperlipidemia    • Hypertension        Past Surgical History:   Procedure Laterality Date   • ABSCESS DRAINAGE      PERINEUM   • AMPUTATION FOOT Left 2022    Procedure: AMPUTATION FOOT;  Surgeon: Addison Colby MD;  Location: [facility];  Service: Podiatry;  Laterality: Left;   • INCISION AND DRAINAGE LEG Left 05/10/2022    Procedure: INCISION AND DRAINAGE LOWER EXTREMITY;  Surgeon: Addison Colby MD;  Location: [facility];  Service: Podiatry;  Laterality: Left;   • WOUND DEBRIDEMENT Left 2022    Procedure: DEBRIDEMENT FOOT;  Surgeon: Addison Colby MD;  Location: [facility];  Service: Podiatry;  Laterality: Left;       PT ASSESSMENT (last 12 hours)     IRF PT Evaluation and Treatment     Row Name 22 1159          PT Time and Intention    Document Type daily treatment  second treatment session  -AG     Mode of Treatment physical therapy;individual therapy  -     Patient/Family/Caregiver Comments/Observations pt. supine with IV vancomycin in place.  L foot splinted.  -     Row Name 22 3215          General Information    Patient Profile Reviewed yes  -AG     Existing Precautions/Restrictions fall;left;non-weight bearing  -     Row Name 22 1156          Pain Assessment    Additional Documentation --  pt. had no c/o pain  during treatment session  -AG     Row Name 06/14/22 1154          Pain Scale: FACES Pre/Post-Treatment    Pain: FACES Scale, Pretreatment 4-->hurts little more  -AG     Posttreatment Pain Rating 4-->hurts little more  -AG     Pain Location - Side/Orientation Left  -AG     Pain Location - foot  -AG     Row Name 06/14/22 1154          Cognition/Psychosocial    Affect/Mental Status (Cognition) WNL  -AG     Orientation Status (Cognition) oriented x 4  -AG     Follows Commands (Cognition) WNL  -AG     Personal Safety Interventions fall prevention program maintained;gait belt;nonskid shoes/slippers when out of bed;supervised activity  -AG     Row Name 06/14/22 1154          Mobility    Left Lower Extremity (Weight-bearing Status) non weight-bearing (NWB)  -AG     Row Name 06/14/22 1154          Bed Mobility    Rolling Left Camp (Bed Mobility) standby assist  -AG     Supine-Sit Camp (Bed Mobility) standby assist  -     Assistive Device (Bed Mobility) bed rails  -AG     Row Name 06/14/22 1154          Transfer Assessment/Treatment    Transfers stand pivot/stand step transfer;sit-stand transfer;stand-sit transfer  -AG     Row Name 06/14/22 1154          Transfers    Bed-Chair Camp (Transfers) verbal cues;nonverbal cues (demo/gesture);contact guard  -     Assistive Device (Bed-Chair Transfers) wheelchair  -AG     Sit-Stand Camp (Transfers) verbal cues;nonverbal cues (demo/gesture);contact guard;standby assist  -     Stand-Sit Camp (Transfers) standby assist;verbal cues;nonverbal cues (demo/gesture);contact guard  -AG     Row Name 06/14/22 1154          Sit-Stand Transfer    Assistive Device (Sit-Stand Transfers) parallel bars;walker, front-wheeled  -AG     Row Name 06/14/22 1154          Stand-Sit Transfer    Assistive Device (Stand-Sit Transfers) parallel bars;walker, front-wheeled  -AG     Row Name 06/14/22 1154          Stand Pivot/Stand Step Transfer    Stand Pivot/Stand Step  [place] (Transfers) verbal cues;nonverbal cues (demo/gesture);contact guard  -AG     Assistive Device (Stand Pivot Stand Step Transfer) wheelchair  -AG     Row Name 06/14/22 1154          Gait/Stairs (Locomotion)    Gait/Stairs Locomotion gait/ambulation assistive device;gait/ambulation independence;distance ambulated;gait pattern;gait deviations;maintains weight-bearing status  -AG     [place] Level (Gait) verbal cues;nonverbal cues (demo/gesture);contact guard  -AG     Assistive Device (Gait) walker, front-wheeled  -     Distance in Feet (Gait) 20 ft x 2  -AG     Pattern (Gait) swing-to  -AG     Deviations/Abnormal Patterns (Gait) stride length decreased;gait speed decreased  -AG     Row Name 06/14/22 1154          Safety Issues, Functional Mobility    Impairments Affecting Function (Mobility) balance;endurance/activity tolerance;pain;strength  -AG     Row Name 06/14/22 1154          Balance    Balance Assessment sitting static balance;sitting dynamic balance;sit to stand dynamic balance;standing static balance;standing dynamic balance  -AG     Static Sitting Balance independent  -AG     Position, Sitting Balance sitting edge of bed;sitting in chair  -AG     Static Standing Balance verbal cues;non-verbal cues (demo/gesture);contact guard;standby assist  -AG     Dynamic Standing Balance verbal cues;non-verbal cues (demo/gesture);contact guard  -AG     Position/Device Used, Standing Balance walker, front-wheeled  -AG     Row Name 06/14/22 1154          Hip (Therapeutic Exercise)    Hip Strengthening (Therapeutic Exercise) bilateral;flexion;extension;aBduction;aDduction;marching while seated;marching while standing;sitting;standing;3 lb free weight;resistance band;green  performed sitting, standing L hip TE  -AG     Row Name 06/14/22 1154          Knee (Therapeutic Exercise)    Knee Strengthening (Therapeutic Exercise) bilateral;flexion;extension;marching while seated;marching while standing;LAQ (long arc  quad);hamstring curls;sitting;standing;3 lb free weight;resistance band;green  L TE in standing, sitting  -AG     Row Name 06/14/22 1154          Ankle (Therapeutic Exercise)    Ankle Strengthening (Therapeutic Exercise) right;dorsiflexion;plantarflexion;standing  -AG     Row Name 06/14/22 1154          Family/Support System    Family/Support System Care self-care encouraged  -AG     Row Name 06/14/22 1154          Coping Strategies    Trust Relationship/Rapport care explained;choices provided;thoughts/feelings acknowledged  -AG     Row Name 06/14/22 1154          Positioning and Restraints    Pre-Treatment Position in bed  -AG     Post Treatment Position wheelchair  -AG     In Wheelchair sitting;call light within reach;encouraged to call for assist  -AG     Row Name 06/14/22 1154          Therapy Assessment/Plan (PT)    Patient's Goals For Discharge return home;take care of myself at home  -AG     Row Name 06/14/22 1154          Daily Progress Summary (PT)    Functional Goal Overall Progress (PT) progressing toward functional goals as expected  -AG     Daily Progress Summary (PT) demonstrating more independent functional transfers  -AG     Impairments Still Limiting Function (PT) balance impairment;functional activity tolerance impairment;strength deficit;pain  -AG     Recommendations (PT) continue POC  -AG     Row Name 06/14/22 1154          Therapy Plan Review/Discharge Plan (PT)    Therapy Plan Review (PT) evaluation/treatment results reviewed;care plan/treatment goals reviewed;risks/benefits reviewed;current/potential barriers reviewed;participants voiced agreement with care plan;participants included;patient  -AG     Anticipated Discharge Disposition (PT) home with home health  -AG           User Key  (r) = Recorded By, (t) = Taken By, (c) = Cosigned By    Initials Name Provider Type    AG Joseline Porter, PT Physical Therapist              Wound 05/16/22 1340 coccyx Pressure Injury (Active)   Dressing  Appearance open to air 06/13/22 1925   Closure None 06/13/22 1925   Base blanchable;red 06/14/22 1010   Periwound redness 06/14/22 1010   Periwound Temperature warm 06/14/22 1010   Edges irregular 06/14/22 1010   Drainage Amount none 06/13/22 1925   Care, Wound barrier applied 06/14/22 1010       Wound 05/18/22 1545 Left other (see comments) foot Incision (Active)   Dressing Appearance dry;intact 06/14/22 0404   Closure Sutures;Staples 06/14/22 0404   Base dressing in place, unable to visualize 06/14/22 1010   Drainage Amount none 06/14/22 0404   Care, Wound cleansed with 06/14/22 0404   Dressing Care dressing changed 06/14/22 0404       Wound 06/02/22 1240 scrotum MASD (Moisture associated skin damage) (Active)   Dressing Appearance open to air 06/14/22 1010   Base red 06/14/22 1010   Care, Wound barrier applied 06/14/22 1010     Physical Therapy Education                 Title: PT OT SLP Therapies (Done)     Topic: Physical Therapy (Done)     Point: Mobility training (Done)     Learning Progress Summary           Patient Acceptance, E,D, VU,NR by  at 6/14/2022 1153      Show all documentation for this point (11)                 Point: Home exercise program (Done)     Learning Progress Summary           Patient Acceptance, E,D, VU,NR by  at 6/14/2022 1153      Show all documentation for this point (11)                 Point: Body mechanics (Done)     Learning Progress Summary           Patient Acceptance, E,D, VU,NR by  at 6/14/2022 1153      Show all documentation for this point (11)                 Point: Precautions (Done)     Learning Progress Summary           Patient Acceptance, E,D, VU,NR by  at 6/14/2022 1153      Show all documentation for this point (11)                             User Key     Initials Effective Dates Name Provider Type Discipline     06/16/21 -  Joseline Porter, PT Physical Therapist PT                PT Recommendation and Plan          Daily Progress Summary (PT)  Functional  Goal Overall Progress (PT): progressing toward functional goals as expected  Daily Progress Summary (PT): demonstrating more independent functional transfers  Impairments Still Limiting Function (PT): balance impairment, functional activity tolerance impairment, strength deficit, pain  Recommendations (PT): continue POC               Time Calculation:      PT Charges     Row Name 06/14/22 1153             Time Calculation    Start Time 1055  -AG      Stop Time 1140  -AG      Time Calculation (min) 45 min  -AG      PT Received On 06/14/22  -      PT - Next Appointment 06/15/22  -            User Key  (r) = Recorded By, (t) = Taken By, (c) = Cosigned By    Initials Name Provider Type    AG Joseline Porter, PT Physical Therapist                Therapy Charges for Today     Code Description Service Date Service Provider Modifiers Qty    30727825594 HC GAIT TRAINING EA 15 MIN 6/14/2022 Joseline Porter, PT GP 1    [id number]  PT THER PROC EA 15 MIN 6/14/2022 Joseline Porter, PT GP 2                   Joseline Porter PT  6/14/2022

## 2022-06-14 NOTE — PROGRESS NOTES
Occupational Therapy:    Physical Therapy: Individual: 90 minutes.    Speech Language Pathology:    Signed by: Joseline Porter, Physical Therapist

## 2022-06-14 NOTE — THERAPY TREATMENT NOTE
Inpatient Rehabilitation - Occupational Therapy Treatment Note     [facility]     Patient Name: Melchor Hardwick  : 1965  MRN: [id number]    Today's Date: 2022                 Admit Date: 2022       No diagnosis found.    Patient Active Problem List   Diagnosis   • Hyperlipidemia   • Hypertensive disorder   • Kidney disease   • Obesity   • Type 2 diabetes mellitus (HCC)   • Gas gangrene of foot (HCC)   • Cellulitis in diabetic foot (HCC)   • Other acute osteomyelitis of left foot (HCC)   • Osteomyelitis (HCC)   • Critical illness myopathy       Past Medical History:   Diagnosis Date   • Diabetes mellitus (HCC)    • Elevated cholesterol    • Hyperlipidemia    • Hypertension        Past Surgical History:   Procedure Laterality Date   • ABSCESS DRAINAGE      PERINEUM   • AMPUTATION FOOT Left 2022    Procedure: AMPUTATION FOOT;  Surgeon: Addison Colby MD;  Location: [facility];  Service: Podiatry;  Laterality: Left;   • INCISION AND DRAINAGE LEG Left 05/10/2022    Procedure: INCISION AND DRAINAGE LOWER EXTREMITY;  Surgeon: Addison Colby MD;  Location: [facility];  Service: Podiatry;  Laterality: Left;   • WOUND DEBRIDEMENT Left 2022    Procedure: DEBRIDEMENT FOOT;  Surgeon: Addison Colby MD;  Location: [facility];  Service: Podiatry;  Laterality: Left;             IRF OT ASSESSMENT FLOWSHEET (last 12 hours)     IRF OT Evaluation and Treatment     Row Name 22 143          OT Time and Intention    Document Type daily treatment  -BF     Mode of Treatment occupational therapy  -BF     Patient Effort good  -BF     Symptoms Noted During/After Treatment none  -BF     Row Name 22 143          General Information    Existing Precautions/Restrictions fall;left;non-weight bearing  <  skin integrity  -BF     Row Name 22 143          Cognition/Psychosocial    Orientation Status (Cognition) oriented x 4  -BF     Follows Commands (Cognition) WFL  -BF     Row Name 22 1437           Bathing    [place] Level (Bathing) set up;supervision  -BF     Position (Bathing) supported sitting  -BF     Comment (Bathing) Set-up/supervision  -BF     Row Name 06/14/22 1430          Upper Body Dressing    [place] Level (Upper Body Dressing) set up assistance  -BF     Position (Upper Body Dressing) edge of bed sitting  -BF     Comment (Upper Body Dressing) Set-up  -BF     Row Name 06/14/22 1430          Lower Body Dressing    [place] Level (Lower Body Dressing) standby assist  -BF     Position (Lower Body Dressing) edge of bed sitting  -BF     Comment (Lower Body Dressing) SBA  -BF     Row Name 06/14/22 1430          Grooming    Comment (Grooming) Mod I  -BF     Row Name 06/14/22 1430          Toileting    Comment (Toileting) Min A  -BF     Row Name 06/14/22 1430          Transfers    Bed-Chair [place] (Transfers) contact guard;verbal cues;nonverbal cues (demo/gesture)  -BF     Assistive Device (Bed-Chair Transfers) wheelchair  -BF     Row Name 06/14/22 1430          Motor Skills    Motor Control/Coordination Interventions gross motor coordination activities;therapeutic exercise/ROM  BUE GMC therex, strengthening; BUE PRE 30 mins, BUE rickshaw 30 lbs X20X6; wrist rolls X2  -BF     Row Name 06/14/22 1430          Positioning and Restraints    In Bed sitting EOB;call light within reach;encouraged to call for assist  In PM  -BF     In Wheelchair sitting;with PT  In AM  -BF           User Key  (r) = Recorded By, (t) = Taken By, (c) = Cosigned By    Initials Name Effective Dates    Mandi Saleem OT 06/16/21 -                  Occupational Therapy Education                 Title: PT OT SLP Therapies (Done)     Topic: Occupational Therapy (Done)     Point: ADL training (Done)     Description:   Instruct learner(s) on proper safety adaptation and remediation techniques during self care or transfers.   Instruct in proper use of assistive devices.              Learning Progress Summary            Patient Acceptance, E, VU,NR by  at 6/14/2022 1430      Show all documentation for this point (10)                 Point: Precautions (Done)     Description:   Instruct learner(s) on prescribed precautions during self-care and functional transfers.              Learning Progress Summary           Patient Acceptance, E, VU,NR by  at 6/14/2022 1430      Show all documentation for this point (10)                             User Key     Initials Effective Dates Name Provider Type Discipline     06/16/21 -  Mandi Smith OT Occupational Therapist OT                    OT Recommendation and Plan    Planned Therapy Interventions (OT): activity tolerance training, adaptive equipment training, BADL retraining, ROM/therapeutic exercise, strengthening exercise, transfer/mobility retraining                    Time Calculation:      Time Calculation- OT     Row Name 06/14/22 1435 06/14/22 0810          Time Calculation- OT    OT Start Time 1400  -BF 0810  -BF     OT Stop Time 1425  -BF 0915  -BF     OT Time Calculation (min) 25 min  -BF 65 min  -BF     Total Timed Code Minutes- OT 25 minute(s)  -BF 65 minute(s)  -BF     OT Non-Billable Time (min) -- 10 min  -BF           User Key  (r) = Recorded By, (t) = Taken By, (c) = Cosigned By    Initials Name Provider Type     SmithMandi tsang OT Occupational Therapist              Therapy Charges for Today     Code Description Service Date Service Provider Modifiers Qty    00246458610 HC OT SELF CARE/MGMT/TRAIN EA 15 MIN 6/13/2022 SmithMandi tsang OT GO 1    16132906355 HC OT THERAPEUTIC ACT EA 15 MIN 6/13/2022 SmithMandi tsang OT GO 2    36110530418 HC OT THER PROC EA 15 MIN 6/13/2022 SmithMandi tsang OT GO 3    98385123210 HC OT SELF CARE/MGMT/TRAIN EA 15 MIN 6/14/2022 SmithMandi tsang OT GO 2    59859687455 HC OT THER PROC EA 15 MIN 6/14/2022 SmithMandi tsang OT GO 3    18101892789 HC OT THERAPEUTIC ACT EA 15 MIN  6/14/2022 Luis, Mandi Freeman, OT GO 1                   Mandi Smith, OT  6/14/2022

## 2022-06-14 NOTE — PROGRESS NOTES
[facility]  PROGRESS NOTE     Patient Identification:  Name:  Melchor Hardwick  Age:  57 y.o.  Sex:  male  :  1965  MRN:  [id number]  Visit Number:  [id number]  ROOM: 109/     Primary Care Provider:  Missy Up APRN    Length of stay in inpatient status:  12    Subjective     Chief Compliant:  No chief complaint on file.      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  Patient is status post left midfoot amputation secondary to osteomyelitis and Staph aureus bacteremia.  Patient is also been diagnosed with cirrhosis secondary to [place], diabetes mellitus, anemia, thrombocytopenia, hypertension, hyperlipidemia and edema.  Patient has tolerated removal of Ortiz catheter well and is voiding on own.    Objective     Current Hospital Meds:aspirin, 81 mg, Oral, Daily  atorvastatin, 40 mg, Oral, Nightly  furosemide, 40 mg, Oral, BID  Insulin Aspart, 0-7 Units, Subcutaneous, TID AC  Insulin Aspart, 15 Units, Subcutaneous, TID AC  insulin detemir, 20 Units, Subcutaneous, Q12H  iron polysaccharides, 150 mg, Oral, Daily  lubrisoft, , Apply externally, BID  metFORMIN, 500 mg, Oral, BID With Meals  multivitamin, 1 tablet, Oral, Daily  nadolol, 20 mg, Oral, Q24H  nystatin, , Topical, Q12H  pantoprazole, 40 mg, Oral, Q AM  senna-docusate sodium, 2 tablet, Oral, BID  sodium chloride, 10 mL, Intravenous, Q12H  sodium chloride, 10 mL, Intravenous, Q12H  spironolactone, 50 mg, Oral, Daily  vancomycin, 1,500 mg, Intravenous, Q12H       ----------------------------------------------------------------------------------------------------------------------  Vital Signs:  Temp:  [98 °F (36.7 °C)-98.9 °F (37.2 °C)] 98.9 °F (37.2 °C)  Heart Rate:  [73] 73  Resp:  [18] 18  BP: (109-131)/(60-67) 131/67  SpO2:  [95 %] 95 %  on   ;   Device (Oxygen Therapy): room air  Body mass index is 36.36 kg/m².    Wt Readings from Last 3 Encounters:   22 115 kg (253 lb 6.4 oz)    06/02/22 117 kg (258 lb)     Intake & Output (last 3 days)       06/11 0701  06/12 0700 06/12 0701  06/13 0700 06/13 0701  06/14 0700 06/14 0701  06/15 0700    P.O. 1800 1200 1200 240    IV Piggyback 500 500 500 500    Total Intake(mL/kg) 2300 (20) 1700 (14.8) 1700 (14.8) 740 (6.4)    Urine (mL/kg/hr) 4000 (1.4) 2700 (1) 3825 (1.4) 450 (0.7)    Stool 0 0 0     Total Output 4000 2700 3825 450    Net -1700 -1000 -2125 +290            Urine Unmeasured Occurrence 1 x       Stool Unmeasured Occurrence 2 x 1 x 1 x         Diet Regular; Cardiac, Consistent Carbohydrate, Daily Fluid Restriction; 1500 mL Fluid Per Day; High Protein  ----------------------------------------------------------------------------------------------------------------------  Physical exam:  Constitutional:   Overweight gentleman in no distress  HEENT: Normocephalic atraumatic  Neck: Supple   Cardiovascular: Regular rate and rhythm  Pulmonary/Chest: Clear to auscultation  Abdominal: Positive bowel sounds soft.   Musculoskeletal: Left foot amputation--wound is dressed  Neurological: No focal deficits  Skin: No rash  Peripheral vascular:  Genitourinary:  ----------------------------------------------------------------------------------------------------------------------    Last echocardiogram:  Results for orders placed during the hospital encounter of 05/10/22    Adult Transthoracic Echo Complete W/ Cont if Necessary Per Protocol    Interpretation Summary  · Normal left ventricular cavity size and wall thickness noted. All left ventricular wall segments contract normally.  · Left ventricular ejection fraction appears to be 61 - 65%.  · The mitral valve is structurally normal with no significant stenosis present. Trace mitral valve regurgitation is present.  · The aortic valve is structurally normal with no regurgitation or stenosis present.  · There is no evidence of pericardial  effusion.    ----------------------------------------------------------------------------------------------------------------------  Results from last 7 days   Lab Units 06/14/22  0144 06/10/22  0035 06/09/22  1604 06/09/22 0142   CRP mg/dL  --  3.08*  --  3.19*   WBC 10*3/mm3 3.40 3.60  --  3.93   HEMOGLOBIN g/dL 8.7* 8.9*  --  8.8*   HEMATOCRIT % 28.1* 29.5*  --  28.1*   MCV fL 81.2 81.9  --  79.4   MCHC g/dL 31.0* 30.2*  --  31.3*   PLATELETS 10*3/mm3 76* 81*  --  88*   INR   --   --  1.06  --          Results from last 7 days   Lab Units 06/14/22  0144 06/10/22  0035 06/09/22  0142   SODIUM mmol/L 135* 134* 133*   POTASSIUM mmol/L 3.5 4.0 3.5   CHLORIDE mmol/L 102 100 99   CO2 mmol/L 23.9 24.8 25.0   BUN mg/dL 22* 18 17   CREATININE mg/dL 0.93 0.71* 0.70*   CALCIUM mg/dL 7.6* 7.8* 7.8*   GLUCOSE mg/dL 138* 174* 159*   ALBUMIN g/dL 1.96* 1.95* 1.87*   BILIRUBIN mg/dL 0.3 0.4 0.4   ALK PHOS U/L 264* 295* 275*   AST (SGOT) U/L 40 84* 74*   ALT (SGPT) U/L 38 53* 48*   Estimated Creatinine Clearance: 111.3 mL/min (by C-G formula based on SCr of 0.93 mg/dL).  No results found for: AMMONIA              Glucose   Date/Time Value Ref Range Status   06/14/2022 1131 214 (H) 70 - 130 mg/dL Final     Comment:     Meter: [id number] : [id number] Negrete Eunice   06/14/2022 0930 161 (H) 70 - 130 mg/dL Final     Comment:     Meter: [id number] : [id number] Steve Naye   06/14/2022 0554 95 70 - 130 mg/dL Final     Comment:     Meter: [id number] : [id number] Tracey Crystal   06/13/2022 1940 129 70 - 130 mg/dL Final     Comment:     Meter: [id number] : [id number] Tracey Crystal   06/13/2022 1615 108 70 - 130 mg/dL Final     Comment:     Meter: [id number] : [id number] Penelope Dawson   06/13/2022 1131 235 (H) 70 - 130 mg/dL Final     Comment:     Meter: [id number] : [id number] Negrete Eunice   06/13/2022 0625 195 (H) 70 - 130 mg/dL Final     Comment:     Meter: [id number] : [id number] Harleigh Crystal    06/12/2022 2008 226 (H) 70 - 130 mg/dL Final     Comment:     Meter: [id number] : [id number] Tracey Crystal     Lab Results   Component Value Date    TSH 4.700 (H) 05/30/2022    FREET4 0.87 (L) 05/30/2022     No results found for: PREGTESTUR, PREGSERUM, HCG, HCGQUANT  Pain Management Panel     Pain Management Panel Latest Ref Rng & Units 5/24/2022 5/11/2022    CREATININE UR mg/dL 91.0 -    AMPHETAMINES SCREEN, URINE Negative - Negative    BARBITURATES SCREEN Negative - Negative    BENZODIAZEPINE SCREEN, URINE Negative - Negative    BUPRENORPHINEUR Negative - Negative    COCAINE SCREEN, URINE Negative - Negative    METHADONE SCREEN, URINE Negative - Negative    METHAMPHETAMINEUR Negative - Negative        Brief Urine Lab Results  (Last result in the past 365 days)      Color   Clarity   Blood   Leuk Est   Nitrite   Protein   CREAT   Urine HCG        05/24/22 1839             91.0             No results found for: BLOODCX      No results found for: URINECX  No results found for: WOUNDCX  No results found for: STOOLCX  Results from last 7 days   Lab Units 06/10/22  0035 06/09/22  0142   CRP mg/dL 3.08* 3.19*       I have personally looked at the labs and they are summarized above.  ----------------------------------------------------------------------------------------------------------------------  Detailed radiology reports for the last 24 hours:    Imaging Results (Last 24 Hours)     ** No results found for the last 24 hours. **        Final impressions for the last 30 days of radiology reports:    CT Abdomen Pelvis Without Contrast    Result Date: 5/25/2022  1.  Advanced liver cirrhosis with changes of portal hypertension which include spinal megaly, prominent portosystemic collateral vessels, small-moderate volume ascites, and marked anasarca. 2.  Moderate constipation and mild generalized ileus. No bowel obstruction. 3.  Miniscule pleural effusions. 4.  Other incidental and nonacute findings detailed above.   This report was finalized on 5/25/2022 8:48 AM by Dr. Brain Rodriguez MD.      US Abdomen Complete    Result Date: 5/20/2022  1. Splenomegaly. 2. Small volume ascites.  This report was finalized on 5/20/2022 12:59 PM by Dr. Lobo Ring MD.      US Scrotum & Testicles    Result Date: 5/20/2022  Extensive scrotal wall swelling.   This report was finalized on 5/20/2022 12:59 PM by Dr. Lobo Ring MD.      US Scrotum & Testicles    Result Date: 5/16/2022  Scrotal wall thickening  This report was finalized on 5/16/2022 4:57 PM by Dr. Lobo Ring MD.      MRI Foot Left Without Contrast    Result Date: 5/16/2022  Appearance concerning for osteomyelitis involving the 3rd metatarsal and likely the adjacent cuneiform.  This report was finalized on 5/16/2022 3:10 PM by Dr. Lobo Ring MD.      US Venous Doppler Lower Extremity Bilateral (duplex)    Result Date: 5/24/2022  No sonographic findings of DVT in the lower extremities.  This report was finalized on 5/24/2022 8:05 AM by Dr. Garry Lovelace II, MD.      XR Foreign Body For MRI    Result Date: 5/16/2022  No radiopaque foreign body identified  This report was finalized on 5/16/2022 10:20 AM by Dr. Lobo Ring MD.      I have personally looked at the radiology images and read the final radiology report.    Assessment & Plan    Critical illness myopathy--today patient was able to do bed mobility standby assistance.  They have assistance for transfers; ambulated 20 feet x 2 with front wheel walker.  Yesterday patient was able to do grooming with modified independence.  Did work on motor skills.    Recent osteomyelitis necessitating midfoot amputation and Staph aureus bacteremia--continue vancomycin through June 23    Number cytopenia slightly worsened today.  Continue supportive care    Diabetes mellitus continue current treatment    Anemia stable    Hypertension continue current treatment    VTE Prophylaxis:   Mechanical Order History:      Ordered        06/09/22  0906  Place Sequential Compression Device  Once            06/09/22 0906  Maintain Sequential Compression Device  Continuous            06/02/22 1707  Maintain Sequential Compression Device  Continuous                    Pharmalogical Order History:      Ordered     Dose Route Frequency Stop    06/02/22 1707  Enoxaparin Sodium (LOVENOX) syringe 40 mg  Status:  Discontinued         40 mg SC Nightly 06/09/22 0905                  Joe Mike MD  [facility]  06/14/22  12:20 EDT

## 2022-06-14 NOTE — SIGNIFICANT NOTE
06/14/22 0821   Plan   Plan Team conference held today.  Spoke to pt about plans for discharge on 6-16-22, recommendation for home health PT, OT, nursing for wound care, I.V Vancomycin 1500 mg every 12 hours until 6-23-22, routine midline care, and wound care to left midfoot amputation site, rolling walker, bedside commode, hospital bed, 22 inch heavy duty wheelchair with ELR, anti-tippers, basic cushion.  Pt prefers [facility].  Pt has no preference for home infusion pharmacy.  Pt is going home with sister Ros at discharge who will assist with wound care and home I.V. antibiotics.   Patient/Family in Agreement with Plan yes

## 2022-06-14 NOTE — THERAPY TREATMENT NOTE
Inpatient Rehabilitation - Physical Therapy Treatment Note        [facility]     Patient Name: Melchor Hardwick  : 1965  MRN: [id number]    Today's Date: 2022                    Admit Date: 2022      Visit Dx:   No diagnosis found.    Patient Active Problem List   Diagnosis   • Hyperlipidemia   • Hypertensive disorder   • Kidney disease   • Obesity   • Type 2 diabetes mellitus (HCC)   • Gas gangrene of foot (HCC)   • Cellulitis in diabetic foot (HCC)   • Other acute osteomyelitis of left foot (HCC)   • Osteomyelitis (HCC)   • Critical illness myopathy       Past Medical History:   Diagnosis Date   • Diabetes mellitus (HCC)    • Elevated cholesterol    • Hyperlipidemia    • Hypertension        Past Surgical History:   Procedure Laterality Date   • ABSCESS DRAINAGE      PERINEUM   • AMPUTATION FOOT Left 2022    Procedure: AMPUTATION FOOT;  Surgeon: Addison Colby MD;  Location: [facility];  Service: Podiatry;  Laterality: Left;   • INCISION AND DRAINAGE LEG Left 05/10/2022    Procedure: INCISION AND DRAINAGE LOWER EXTREMITY;  Surgeon: Addison Colby MD;  Location: [facility];  Service: Podiatry;  Laterality: Left;   • WOUND DEBRIDEMENT Left 2022    Procedure: DEBRIDEMENT FOOT;  Surgeon: Addison Colby MD;  Location: [facility];  Service: Podiatry;  Laterality: Left;       PT ASSESSMENT (last 12 hours)     IRF PT Evaluation and Treatment     Row Name 22 1504 22 1154       PT Time and Intention    Document Type daily treatment  am session  -LB daily treatment  second treatment session  -AG    Mode of Treatment individual therapy;physical therapy  -LB physical therapy;individual therapy  -AG    Patient/Family/Caregiver Comments/Observations -- pt. supine with IV vancomycin in place.  L foot splinted.  -AG    Row Name 22 1504 22 5913       General Information    Patient Profile Reviewed yes  -LB yes  -AG    Existing Precautions/Restrictions fall;non-weight bearing  <skin  integrity L foot  -LB fall;left;non-weight bearing  -AG    Row Name 06/14/22 1154          Pain Assessment    Additional Documentation --  pt. had no c/o pain during treatment session  -AG     Row Name 06/14/22 1504 06/14/22 1154       Pain Scale: FACES Pre/Post-Treatment    Pain: FACES Scale, Pretreatment 4-->hurts little more  -LB 4-->hurts little more  -AG    Posttreatment Pain Rating 4-->hurts little more  -LB 4-->hurts little more  -AG    Pain Location - Side/Orientation -- Left  -AG    Pain Location - --  left foot  -LB foot  -AG    Pre/Posttreatment Pain Comment rest, repositioned  -LB --    Row Name 06/14/22 1504 06/14/22 1154       Cognition/Psychosocial    Affect/Mental Status (Cognition) WFL  -LB WNL  -AG    Orientation Status (Cognition) -- oriented x 4  -AG    Follows Commands (Cognition) WFL  -LB WNL  -AG    Personal Safety Interventions gait belt;nonskid shoes/slippers when out of bed;supervised activity  -LB fall prevention program maintained;gait belt;nonskid shoes/slippers when out of bed;supervised activity  -AG    Row Name 06/14/22 1504 06/14/22 1154       Mobility    Left Lower Extremity (Weight-bearing Status) non weight-bearing (NWB)  -LB non weight-bearing (NWB)  -AG    Right Lower Extremity (Weight-bearing Status) weight-bearing as tolerated (WBAT)  -LB --    Row Name 06/14/22 1504 06/14/22 1154       Bed Mobility    Rolling Left Dickey (Bed Mobility) -- standby assist  -AG    Supine-Sit Dickey (Bed Mobility) -- standby assist  -AG    Assistive Device (Bed Mobility) -- bed rails  -AG    Comment, (Bed Mobility) not observed  -LB --    Row Name 06/14/22 1154          Transfer Assessment/Treatment    Transfers stand pivot/stand step transfer;sit-stand transfer;stand-sit transfer  -     Row Name 06/14/22 1504 06/14/22 1154       Transfers    Bed-Chair Dickey (Transfers) -- verbal cues;nonverbal cues (demo/gesture);contact guard  -AG    Chair-Bed Dickey (Transfers)  contact guard;verbal cues;nonverbal cues (demo/gesture)  -LB --    Assistive Device (Bed-Chair Transfers) -- wheelchair  -AG    Sit-Stand Kane (Transfers) verbal cues;nonverbal cues (demo/gesture);contact guard  -LB verbal cues;nonverbal cues (demo/gesture);contact guard;standby assist  -AG    Stand-Sit Kane (Transfers) verbal cues;nonverbal cues (demo/gesture);contact guard  -LB standby assist;verbal cues;nonverbal cues (demo/gesture);contact guard  -AG    Row Name 06/14/22 1504          Chair-Bed Transfer    Assistive Device (Chair-Bed Transfers) wheelchair  -LB     Row Name 06/14/22 1504 06/14/22 1154       Sit-Stand Transfer    Assistive Device (Sit-Stand Transfers) walker, front-wheeled;wheelchair  -LB parallel bars;walker, front-wheeled  -AG    Row Name 06/14/22 1504 06/14/22 1154       Stand-Sit Transfer    Assistive Device (Stand-Sit Transfers) walker, front-wheeled;wheelchair  -LB parallel bars;walker, front-wheeled  -AG    Row Name 06/14/22 1154          Stand Pivot/Stand Step Transfer    Stand Pivot/Stand Step Kane (Transfers) verbal cues;nonverbal cues (demo/gesture);contact guard  -AG     Assistive Device (Stand Pivot Stand Step Transfer) wheelchair  -AG     Row Name 06/14/22 1504 06/14/22 1154       Gait/Stairs (Locomotion)    Gait/Stairs Locomotion -- gait/ambulation assistive device;gait/ambulation independence;distance ambulated;gait pattern;gait deviations;maintains weight-bearing status  -AG    Kane Level (Gait) contact guard;verbal cues;nonverbal cues (demo/gesture)  -LB verbal cues;nonverbal cues (demo/gesture);contact guard  -AG    Assistive Device (Gait) walker, front-wheeled  -LB walker, front-wheeled  -AG    Distance in Feet (Gait) 20' 20' 35'  -LB 20 ft x 2  -AG    Pattern (Gait) -- swing-to  -AG    Deviations/Abnormal Patterns (Gait) antalgic;yao decreased;gait speed decreased;stride length decreased  -LB stride length decreased;gait speed decreased  -AG     Bilateral Gait Deviations forward flexed posture  -LB --    Row Name 06/14/22 1154          Safety Issues, Functional Mobility    Impairments Affecting Function (Mobility) balance;endurance/activity tolerance;pain;strength  -     Row Name 06/14/22 1154          Balance    Balance Assessment sitting static balance;sitting dynamic balance;sit to stand dynamic balance;standing static balance;standing dynamic balance  -     Static Sitting Balance independent  -AG     Position, Sitting Balance sitting edge of bed;sitting in chair  -     Static Standing Balance verbal cues;non-verbal cues (demo/gesture);contact guard;standby assist  -     Dynamic Standing Balance verbal cues;non-verbal cues (demo/gesture);contact guard  -     Position/Device Used, Standing Balance walker, front-wheeled  -     Row Name 06/14/22 1504          Motor Skills    Therapeutic Exercise --  sitting ex  -LB     Row Name 06/14/22 1154          Hip (Therapeutic Exercise)    Hip Strengthening (Therapeutic Exercise) bilateral;flexion;extension;aBduction;aDduction;marching while seated;marching while standing;sitting;standing;3 lb free weight;resistance band;green  performed sitting, standing L hip TE  -[facility] Name 06/14/22 1154          Knee (Therapeutic Exercise)    Knee Strengthening (Therapeutic Exercise) bilateral;flexion;extension;marching while seated;marching while standing;LAQ (long arc quad);hamstring curls;sitting;standing;3 lb free weight;resistance band;green  L TE in standing, sitting  -[facility] Name 06/14/22 1154          Ankle (Therapeutic Exercise)    Ankle Strengthening (Therapeutic Exercise) right;dorsiflexion;plantarflexion;standing  -     Row Name 06/14/22 1154          Family/Support System    Family/Support System Care self-care encouraged  -[facility] Name 06/14/22 1154          Coping Strategies    Trust Relationship/Rapport care explained;choices provided;thoughts/feelings acknowledged  -[facility] Name  06/14/22 1504 06/14/22 1154       Positioning and Restraints    Pre-Treatment Position sitting in chair/recliner  -LB in bed  -AG    Post Treatment Position -- wheelchair  -AG    In Bed sitting EOB;call light within reach;encouraged to call for assist  -LB --    In Wheelchair -- sitting;call light within reach;encouraged to call for assist  -AG    Row Name 06/14/22 1154          Therapy Assessment/Plan (PT)    Patient's Goals For Discharge return home;take care of myself at home  -AG     Row Name 06/14/22 1504 06/14/22 1154       Daily Progress Summary (PT)    Functional Goal Overall Progress (PT) -- progressing toward functional goals as expected  -AG    Daily Progress Summary (PT) -- demonstrating more independent functional transfers  -AG    Impairments Still Limiting Function (PT) -- balance impairment;functional activity tolerance impairment;strength deficit;pain  -AG    Recommendations (PT) continue PT  -LB continue POC  -AG    Row Name 06/14/22 1154          Therapy Plan Review/Discharge Plan (PT)    Therapy Plan Review (PT) evaluation/treatment results reviewed;care plan/treatment goals reviewed;risks/benefits reviewed;current/potential barriers reviewed;participants voiced agreement with care plan;participants included;patient  -AG     Anticipated Discharge Disposition (PT) home with home health  -AG           User Key  (r) = Recorded By, (t) = Taken By, (c) = Cosigned By    Initials Name Provider Type    Willow Spencer, PT Physical Therapist    AG Joseline Porter, PT Physical Therapist              Wound 05/16/22 1340 coccyx Pressure Injury (Active)   Dressing Appearance open to air 06/13/22 1925   Closure None 06/13/22 1925   Base blanchable;red 06/14/22 1010   Periwound redness 06/14/22 1010   Periwound Temperature warm 06/14/22 1010   Edges irregular 06/14/22 1010   Drainage Amount none 06/13/22 1925   Care, Wound barrier applied 06/14/22 1010       Wound 05/18/22 1545 Left other (see comments)  foot Incision (Active)   Dressing Appearance dry;intact 06/14/22 0404   Closure Sutures;Staples 06/14/22 0404   Base dressing in place, unable to visualize 06/14/22 1010   Drainage Amount none 06/14/22 0404   Care, Wound cleansed with 06/14/22 0404   Dressing Care dressing changed 06/14/22 0404       Wound 06/02/22 1240 scrotum MASD (Moisture associated skin damage) (Active)   Dressing Appearance open to air 06/14/22 1010   Base red 06/14/22 1010   Care, Wound barrier applied 06/14/22 1010     Physical Therapy Education                 Title: PT OT SLP Therapies (Done)     Topic: Physical Therapy (Done)     Point: Mobility training (Done)     Learning Progress Summary           Patient Acceptance, E, VU,NR by  at 6/14/2022 1513      Show all documentation for this point (12)                 Point: Home exercise program (Done)     Learning Progress Summary           Patient Acceptance, E, VU,NR by LB at 6/14/2022 1513      Show all documentation for this point (12)                 Point: Body mechanics (Done)     Learning Progress Summary           Patient Acceptance, E, VU,NR by LB at 6/14/2022 1513      Show all documentation for this point (12)                 Point: Precautions (Done)     Learning Progress Summary           Patient Acceptance, E, VU,NR by LB at 6/14/2022 1513      Show all documentation for this point (12)                             User Key     Initials Effective Dates Name Provider Type Discipline     06/16/21 -  Willow Villalta, PT Physical Therapist PT                PT Recommendation and Plan    Planned Therapy Interventions (PT): balance training, bed mobility training, gait training, patient/family education, ROM (range of motion), strengthening, transfer training, wheelchair management/propulsion training  Frequency of Treatment (PT): 5 times per week  Anticipated Equipment Needs at Discharge (PT Eval):  (tbd)  Daily Progress Summary (PT)  Recommendations (PT): continue  PT               Time Calculation:      PT Charges     Row Name 06/14/22 1513 06/14/22 1153          Time Calculation    Start Time 0915  -LB 1055  -AG     Stop Time 1000  -LB 1140  -AG     Time Calculation (min) 45 min  -LB 45 min  -AG     PT Received On 06/14/22  -LB 06/14/22  -AG     PT - Next Appointment -- 06/15/22  -AG           User Key  (r) = Recorded By, (t) = Taken By, (c) = Cosigned By    Initials Name Provider Type    Willow Spencer, PT Physical Therapist    Joseline Kay, PT Physical Therapist                Therapy Charges for Today     Code Description Service Date Service Provider Modifiers Qty    29276233687 HC GAIT TRAINING EA 15 MIN 6/14/2022 Willow Villalta, PT GP 1    68380158346 HC PT THER PROC EA 15 MIN 6/14/2022 Willow Villalta, PT GP 2                   Willow Villalta, PT  6/14/2022

## 2022-06-15 LAB
GLUCOSE BLDC GLUCOMTR-MCNC: 137 MG/DL (ref 70–130)
GLUCOSE BLDC GLUCOMTR-MCNC: 157 MG/DL (ref 70–130)
GLUCOSE BLDC GLUCOMTR-MCNC: 67 MG/DL (ref 70–130)
GLUCOSE BLDC GLUCOMTR-MCNC: 74 MG/DL (ref 70–130)
GLUCOSE BLDC GLUCOMTR-MCNC: 80 MG/DL (ref 70–130)
GLUCOSE BLDC GLUCOMTR-MCNC: 82 MG/DL (ref 70–130)

## 2022-06-15 PROCEDURE — 97110 THERAPEUTIC EXERCISES: CPT

## 2022-06-15 PROCEDURE — 97530 THERAPEUTIC ACTIVITIES: CPT

## 2022-06-15 PROCEDURE — 99231 SBSQ HOSP IP/OBS SF/LOW 25: CPT | Performed by: FAMILY MEDICINE

## 2022-06-15 PROCEDURE — 25010000002 VANCOMYCIN 5 G RECONSTITUTED SOLUTION: Performed by: INTERNAL MEDICINE

## 2022-06-15 PROCEDURE — 82962 GLUCOSE BLOOD TEST: CPT

## 2022-06-15 PROCEDURE — 63710000001 INSULIN ASPART PER 5 UNITS: Performed by: FAMILY MEDICINE

## 2022-06-15 PROCEDURE — 97116 GAIT TRAINING THERAPY: CPT

## 2022-06-15 PROCEDURE — 63710000001 INSULIN DETEMIR PER 5 UNITS: Performed by: INTERNAL MEDICINE

## 2022-06-15 PROCEDURE — 97535 SELF CARE MNGMENT TRAINING: CPT

## 2022-06-15 RX ORDER — CEFDINIR 300 MG/1
300 CAPSULE ORAL EVERY 12 HOURS SCHEDULED
Status: DISCONTINUED | OUTPATIENT
Start: 2022-06-15 | End: 2022-06-16 | Stop reason: HOSPADM

## 2022-06-15 RX ORDER — INSULIN ASPART 100 [IU]/ML
10 INJECTION, SOLUTION INTRAVENOUS; SUBCUTANEOUS
Status: DISCONTINUED | OUTPATIENT
Start: 2022-06-15 | End: 2022-06-16 | Stop reason: HOSPADM

## 2022-06-15 RX ADMIN — SPIRONOLACTONE 50 MG: 25 TABLET ORAL at 08:53

## 2022-06-15 RX ADMIN — Medication: at 21:25

## 2022-06-15 RX ADMIN — INSULIN DETEMIR 20 UNITS: 100 INJECTION, SOLUTION SUBCUTANEOUS at 09:11

## 2022-06-15 RX ADMIN — PANTOPRAZOLE SODIUM 40 MG: 40 TABLET, DELAYED RELEASE ORAL at 05:06

## 2022-06-15 RX ADMIN — METFORMIN HYDROCHLORIDE 500 MG: 500 TABLET ORAL at 17:47

## 2022-06-15 RX ADMIN — Medication: at 08:58

## 2022-06-15 RX ADMIN — CEFDINIR 300 MG: 300 CAPSULE ORAL at 14:52

## 2022-06-15 RX ADMIN — NYSTATIN: 100000 POWDER TOPICAL at 21:25

## 2022-06-15 RX ADMIN — ATORVASTATIN CALCIUM 40 MG: 40 TABLET, FILM COATED ORAL at 21:24

## 2022-06-15 RX ADMIN — FUROSEMIDE 40 MG: 40 TABLET ORAL at 08:53

## 2022-06-15 RX ADMIN — NYSTATIN: 100000 POWDER TOPICAL at 08:58

## 2022-06-15 RX ADMIN — Medication 150 MG: at 08:52

## 2022-06-15 RX ADMIN — Medication 10 ML: at 08:54

## 2022-06-15 RX ADMIN — ASPIRIN 81 MG: 81 TABLET, COATED ORAL at 08:53

## 2022-06-15 RX ADMIN — FUROSEMIDE 40 MG: 40 TABLET ORAL at 17:47

## 2022-06-15 RX ADMIN — VANCOMYCIN HYDROCHLORIDE 1500 MG: 5 INJECTION, POWDER, LYOPHILIZED, FOR SOLUTION INTRAVENOUS at 21:22

## 2022-06-15 RX ADMIN — Medication 10 ML: at 21:25

## 2022-06-15 RX ADMIN — INSULIN ASPART 15 UNITS: 100 INJECTION, SOLUTION INTRAVENOUS; SUBCUTANEOUS at 09:10

## 2022-06-15 RX ADMIN — VANCOMYCIN HYDROCHLORIDE 1500 MG: 5 INJECTION, POWDER, LYOPHILIZED, FOR SOLUTION INTRAVENOUS at 09:10

## 2022-06-15 RX ADMIN — CEFDINIR 300 MG: 300 CAPSULE ORAL at 21:24

## 2022-06-15 RX ADMIN — NADOLOL 20 MG: 40 TABLET ORAL at 08:53

## 2022-06-15 RX ADMIN — Medication 10 MG: at 21:24

## 2022-06-15 RX ADMIN — Medication 1 TABLET: at 08:53

## 2022-06-15 RX ADMIN — METFORMIN HYDROCHLORIDE 500 MG: 500 TABLET ORAL at 08:53

## 2022-06-15 NOTE — PROGRESS NOTES
Occupational Therapy: Individual: 100 minutes.    Physical Therapy:    Speech Language Pathology:    Signed by: Judy Villatoro, Occupational Therapist

## 2022-06-15 NOTE — THERAPY TREATMENT NOTE
Inpatient Rehabilitation - Physical Therapy Treatment Note        Eder     Patient Name: Melchor Hardwick  : 1965  MRN: [id number]    Today's Date: 6/15/2022                    Admit Date: 2022      Visit Dx:   No diagnosis found.    Patient Active Problem List   Diagnosis   • Hyperlipidemia   • Hypertensive disorder   • Kidney disease   • Obesity   • Type 2 diabetes mellitus (HCC)   • Gas gangrene of foot (HCC)   • Cellulitis in diabetic foot (HCC)   • Other acute osteomyelitis of left foot (HCC)   • Osteomyelitis (HCC)   • Critical illness myopathy       Past Medical History:   Diagnosis Date   • Diabetes mellitus (HCC)    • Elevated cholesterol    • Hyperlipidemia    • Hypertension        Past Surgical History:   Procedure Laterality Date   • ABSCESS DRAINAGE      PERINEUM   • AMPUTATION FOOT Left 2022    Procedure: AMPUTATION FOOT;  Surgeon: Addison Colby MD;  Location: [facility];  Service: Podiatry;  Laterality: Left;   • INCISION AND DRAINAGE LEG Left 05/10/2022    Procedure: INCISION AND DRAINAGE LOWER EXTREMITY;  Surgeon: Addison Colby MD;  Location: [facility];  Service: Podiatry;  Laterality: Left;   • WOUND DEBRIDEMENT Left 2022    Procedure: DEBRIDEMENT FOOT;  Surgeon: Addison Colby MD;  Location: [facility];  Service: Podiatry;  Laterality: Left;       PT ASSESSMENT (last 12 hours)     IRF PT Evaluation and Treatment     Row Name 06/15/22 1437          PT Time and Intention    Document Type daily treatment  am session  -RG     Mode of Treatment individual therapy;physical therapy  -RG     Patient/Family/Caregiver Comments/Observations Pt and nursnig in agreement for skilled PT on this date.  -RG     Row Name 06/15/22 1437          General Information    Patient Profile Reviewed yes  -RG     Existing Precautions/Restrictions fall;non-weight bearing  <skin integrity L foot  -RG     Row Name 06/15/22 1437          Pain Scale: FACES Pre/Post-Treatment    Pain: FACES Scale,  Pretreatment 4-->hurts little more  -RG     Posttreatment Pain Rating 4-->hurts little more  -RG     [facility] Name 06/15/22 1437          Cognition/Psychosocial    Affect/Mental Status (Cognition) WFL  -RG     Follows Commands (Cognition) WFL  -RG     Personal Safety Interventions gait belt;nonskid shoes/slippers when out of bed;fall prevention program maintained  -[facility] Name 06/15/22 1437          Mobility    Left Lower Extremity (Weight-bearing Status) non weight-bearing (NWB)  -RG     Right Lower Extremity (Weight-bearing Status) weight-bearing as tolerated (WBAT)  -[facility] Name 06/15/22 1437          Transfers    Chair-Bed [place] (Transfers) contact guard;verbal cues;nonverbal cues (demo/gesture)  -RG     Sit-Stand [place] (Transfers) verbal cues;nonverbal cues (demo/gesture);contact guard  -RG     Stand-Sit [place] (Transfers) verbal cues;nonverbal cues (demo/gesture);contact guard  -RG     Row Name 06/15/22 1437          Chair-Bed Transfer    Assistive Device (Chair-Bed Transfers) wheelchair  -RG     Row Name 06/15/22 1437          Sit-Stand Transfer    Assistive Device (Sit-Stand Transfers) walker, front-wheeled;wheelchair  -[facility] Name 06/15/22 1437          Stand-Sit Transfer    Assistive Device (Stand-Sit Transfers) walker, front-wheeled;wheelchair  -[facility] Name 06/15/22 1437          Gait/Stairs (Locomotion)    [place] Level (Gait) contact guard;verbal cues;nonverbal cues (demo/gesture)  -RG     Assistive Device (Gait) walker, front-wheeled  -RG     Distance in Feet (Gait) 20' x 3  -RG     Deviations/Abnormal Patterns (Gait) antalgic;yao decreased;gait speed decreased;stride length decreased  -RG     Bilateral Gait Deviations forward flexed posture  -RG     Comment, (Gait/Stairs) c/o fatigue, required rest breaks  -[facility] Name 06/15/22 1437          Hip (Therapeutic Exercise)    Hip Strengthening (Therapeutic Exercise) bilateral;flexion;aBduction;aDduction;marching  while seated;marching while standing;sitting;standing;resistance band;green;10 repetitions;2 sets  L LE only in standing  -RG     Row Name 06/15/22 1437          Knee (Therapeutic Exercise)    Knee Strengthening (Therapeutic Exercise) bilateral;flexion;extension;marching while seated;marching while standing;LAQ (long arc quad);sitting;standing;resistance band;green;10 repetitions;2 sets  L LE only in standing  -RG     Row Name 06/15/22 1437          Ankle (Therapeutic Exercise)    Ankle Strengthening (Therapeutic Exercise) bilateral;dorsiflexion;plantarflexion;sitting;20 repititions  -RG     Row Name 06/15/22 1437          Positioning and Restraints    Pre-Treatment Position sitting in chair/recliner  -RG     Post Treatment Position wheelchair  -RG     In Wheelchair notified nsg;sitting;call light within reach;encouraged to call for assist;legs elevated  -RG           User Key  (r) = Recorded By, (t) = Taken By, (c) = Cosigned By    Initials Name Provider Type    RG Michi Mckeon, LIZETT Physical Therapist Assistant              Wound 05/16/22 1340 coccyx Pressure Injury (Active)   Dressing Appearance open to air 06/14/22 1915   Closure None 06/14/22 1915   Base blanchable;red 06/14/22 1915   Drainage Amount none 06/14/22 1915   Care, Wound barrier applied 06/14/22 1915       Wound 05/18/22 1545 Left other (see comments) foot Incision (Active)   Dressing Appearance dry;intact 06/14/22 1915   Closure AGATHA 06/14/22 1915   Base dressing in place, unable to visualize 06/14/22 1915   Periwound dry;intact 06/14/22 1822   Periwound Temperature warm 06/14/22 1822   Periwound Skin Turgor firm 06/14/22 1822   Edges rolled/closed 06/14/22 1822   Drainage Amount none 06/14/22 1822       Wound 06/02/22 1240 scrotum MASD (Moisture associated skin damage) (Active)   Dressing Appearance open to air 06/14/22 1915   Base red 06/14/22 1915   Care, Wound barrier applied 06/14/22 1915     Physical Therapy Education                 Title:  PT OT SLP Therapies (Done)     Topic: Physical Therapy (Done)     Point: Mobility training (Done)     Learning Progress Summary           Patient Acceptance, E,D, VU,NR by  at 6/15/2022 1442      Show all documentation for this point (13)                 Point: Home exercise program (Done)     Learning Progress Summary           Patient Acceptance, E,D, VU,NR by RG at 6/15/2022 1442      Show all documentation for this point (13)                 Point: Body mechanics (Done)     Learning Progress Summary           Patient Acceptance, E,D, VU,NR by RG at 6/15/2022 1442      Show all documentation for this point (13)                 Point: Precautions (Done)     Learning Progress Summary           Patient Acceptance, E,D, VU,NR by RG at 6/15/2022 1442      Show all documentation for this point (13)                             User Key     Initials Effective Dates Name Provider Type [facility] 06/16/21 -  Michi Mckeon PTA Physical Therapist Assistant PT                PT Recommendation and Plan    Frequency of Treatment (PT): 5 times per week  Anticipated Equipment Needs at Discharge (PT Eval):  (tbd)                  Time Calculation:      PT Charges     Row Name 06/15/22 1443             Time Calculation    Start Time 0915  -RG      Stop Time 1045  -RG      Time Calculation (min) 90 min  -RG      PT Received On 06/15/22  -RG              Time Calculation- PT    Total Timed Code Minutes- PT 90 minute(s)  -            User Key  (r) = Recorded By, (t) = Taken By, (c) = Cosigned By    Initials Name Provider Type    RG Michi Mckeon PTA Physical Therapist Assistant                Therapy Charges for Today     Code Description Service Date Service Provider Modifiers Qty    60063331640 HC GAIT TRAINING EA 15 MIN 6/15/2022 Michi Mckeon PTA GP, CQ 1    23004054853 HC PT THERAPEUTIC ACT EA 15 MIN 6/15/2022 Michi Mckeon PTA GP, CQ 2    96158612477 HC PT THER PROC EA 15 MIN 6/15/2022 Michi Mckeon PTA GP,  CQ 3                   Michi Mckeon, PTA  6/15/2022

## 2022-06-15 NOTE — PROGRESS NOTES
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  Comorbidities: G72.81 Critical illness myopathy  M86.9 Osteomyelitis, unspecified  K76.6 Portal hypertension  R65.20 Severe sepsis without septic shock  E11.69 Type 2 diabetes mellitus with other specified complication  K74.60 Unspecified cirrhosis of liver  K75.81 Nonalcoholic steatohepatitis (PAZ)  E78.5 Hyperlipidemia, unspecified  I10 Essential (primary) hypertension  E11.40 Type 2 diabetes mellitus with diabetic neuropathy, unspecified  Z89.432 Acquired absence of left foot  Z68.37 Body mass index (BMI) 37.0-37.9, adult  E66.9 Obesity, unspecified  D50.9 Iron deficiency anemia, unspecified  N50.89 Other specified disorders of the male genital organs  R60.1 Generalized edema  Z79.4 Long term (current) use of insulin      Plan of Care  Anticipated Discharge Date/Estimated Length of Stay: 6-16-22  Anticipated Discharge Destination: Community discharge with assistance  Discharge Plan : Pt plans to go home with sister Ros at discharge.  Medical Necessity Expected Level Rationale: Good  Intensity and Duration: an average of 3 hours/5 days per week  Medical Supervision and 24 Hour Rehab Nursing: x  Physical Therapy: x  PT Intensity/Duration: 1.5 hrs/day, 5-6 days/week  Occupational Therapy: x  OT Intensity/Duration: 1.5 hrs/day, 5-6 days/week  Social Work: x  Therapeutic Recreation: x  Updated (if changes indicated)    Anticipated Discharge Date/Estimated Length of Stay:   6-16-22      Discharge Plan of Care: Home Health Services Physical  Therapy strengthening,  endurance, gait training, transfer training, balance, therapeutic exercise, bed  mobility, home safety 3 times per week for 4 weeks Occupational Therapy ADL  re-training, home safety, functional mobility, strengthening 3 times per week  for 4 weeks Ambulatory: walker with wheels Hospital beds and Accessories:  Semi-electric bed with mattress Commodes: Bedside commode Wheelchairs: heavy  duty wheelchair  Size:  22 inch elevating legrest, anti-tippers, basic cushion    Based on the patient's medical and functional status, their prognosis and  expected level of functional improvement is: Good      Interdisciplinary Problem/Goals/Status  Body Function Structure    [RN] Skin Integrity(Active)  Current Status(06/02/2022): impaired skin integrity  Weekly Goal(06/24/2022): improved skin integrity  Discharge Goal: wound healing        Mobility    [PT] Bed/Chair/Wheelchair(Active)  Current Status(06/03/2022): min A  Weekly Goal(06/10/2022): Sup  Discharge Goal: Sup    [PT] Walk(Active)  Current Status(06/03/2022): unable  Weekly Goal(06/10/2022): amb 2' RW Sup  Discharge Goal: amb 2' RW Sup        Pain    [RN] Pain Management(Active)  Current Status(06/02/2022): potential for increased pain  Weekly Goal(06/24/2022): pain at a tolerable level  Discharge Goal: no pain        Safety    [RN] Potential for Injury(Active)  Current Status(06/02/2022): potential for falls  Weekly Goal(06/24/2022): no falls  Discharge Goal: no falls        Self Care    [OT] Dressing (Lower)(Active)  Current Status(06/13/2022): CGA  Weekly Goal(06/21/2022): SBA  Discharge Goal: SBA    Comments: Pt plans to go home with sister at discharge who will assist with  wound care and home I.V. antibiotics.    Signed by: KAREN Calderon    Physician CoSigned By: Joe Mike 06/15/2022 13:27:27

## 2022-06-15 NOTE — THERAPY TREATMENT NOTE
Inpatient Rehabilitation - Occupational Therapy Treatment Note     Eder     Patient Name: Melchor Hardwick  : 1965  MRN: [id number]    Today's Date: 6/15/2022                 Admit Date: 2022       No diagnosis found.    Patient Active Problem List   Diagnosis   • Hyperlipidemia   • Hypertensive disorder   • Kidney disease   • Obesity   • Type 2 diabetes mellitus (HCC)   • Gas gangrene of foot (HCC)   • Cellulitis in diabetic foot (HCC)   • Other acute osteomyelitis of left foot (HCC)   • Osteomyelitis (HCC)   • Critical illness myopathy       Past Medical History:   Diagnosis Date   • Diabetes mellitus (HCC)    • Elevated cholesterol    • Hyperlipidemia    • Hypertension        Past Surgical History:   Procedure Laterality Date   • ABSCESS DRAINAGE      PERINEUM   • AMPUTATION FOOT Left 2022    Procedure: AMPUTATION FOOT;  Surgeon: Addison Colby MD;  Location: [facility];  Service: Podiatry;  Laterality: Left;   • INCISION AND DRAINAGE LEG Left 05/10/2022    Procedure: INCISION AND DRAINAGE LOWER EXTREMITY;  Surgeon: Addison Colby MD;  Location: [facility];  Service: Podiatry;  Laterality: Left;   • WOUND DEBRIDEMENT Left 2022    Procedure: DEBRIDEMENT FOOT;  Surgeon: Addison Colby MD;  Location: [facility];  Service: Podiatry;  Laterality: Left;             IRF OT ASSESSMENT FLOWSHEET (last 12 hours)     IRF OT Evaluation and Treatment     Row Name 06/15/22 1418          OT Time and Intention    Document Type daily treatment  -     Mode of Treatment occupational therapy  -     Symptoms Noted During/After Treatment none  -     Row Name 06/15/22 1418          General Information    Patient/Family/Caregiver Comments/Observations agreeable to therapy  -     Existing Precautions/Restrictions fall;non-weight bearing  NWB LLE, decreased skin integrity  -     Row Name 06/15/22 1418          Upper Body Dressing    [place] Level (Upper Body Dressing) set up assistance  -     Row  Name 06/15/22 1418          Lower Body Dressing    [place] Level (Lower Body Dressing) standby assist  -     Position (Lower Body Dressing) edge of bed sitting  -     Row Name 06/15/22 1418          Transfers    Bed-Chair [place] (Transfers) contact guard;verbal cues  -     Chair-Bed [place] (Transfers) contact guard;verbal cues  -     Assistive Device (Bed-Chair Transfers) wheelchair  -     Row Name 06/15/22 1418          Chair-Bed Transfer    Assistive Device (Chair-Bed Transfers) wheelchair  -     Row Name 06/15/22 1418          Motor Skills    Motor Skills functional endurance  -     Motor Control/Coordination Interventions therapeutic exercise/ROM  BUE ther ex/act, AROM, bilat coord ex, PRE  -CJ     Therapeutic Exercise --  theraband;  therabar / dowel ex;  hand exerciser  -     Row Name 06/15/22 1418          Positioning and Restraints    Post Treatment Position bed  -CJ     In Bed call light within reach;encouraged to call for assist;notified nsg;sitting EOB  pm  -CJ     In Wheelchair with PT  am  -           User Key  (r) = Recorded By, (t) = Taken By, (c) = Cosigned By    Initials Name Effective Dates     Judy Villatoro, OT 06/16/21 -                  Occupational Therapy Education                 Title: PT OT SLP Therapies (Done)     Topic: Occupational Therapy (Done)     Point: ADL training (Done)     Description:   Instruct learner(s) on proper safety adaptation and remediation techniques during self care or transfers.   Instruct in proper use of assistive devices.              Learning Progress Summary           Patient Acceptance, E,D, VU,NR by  at 6/15/2022 1429      Show all documentation for this point (11)                 Point: Precautions (Done)     Description:   Instruct learner(s) on prescribed precautions during self-care and functional transfers.              Learning Progress Summary           Patient Acceptance, E,D, VU,NR by  at 6/15/2022 2953       Show all documentation for this point (11)                             User Key     Initials Effective Dates Name Provider Type [facility] 06/16/21 -  Judy Villatoro OT Occupational Therapist OT                    OT Recommendation and Plan                         Time Calculation:      Time Calculation- OT     Row Name 06/15/22 1430 06/15/22 1428          Time Calculation- OT    OT Start Time 1330  - 0805  -     OT Stop Time 1400  - 0915  -     OT Time Calculation (min) 30 min  - 70 min  -     Total Timed Code Minutes- OT 30 minute(s)  - 70 minute(s)  -           User Key  (r) = Recorded By, (t) = Taken By, (c) = Cosigned By    Initials Name Provider Type     Judy Villatoro OT Occupational Therapist              Therapy Charges for Today     Code Description Service Date Service Provider Modifiers Qty    04127532263 HC OT SELF CARE/MGMT/TRAIN EA 15 MIN 6/15/2022 Judy Villatoro OT GO 2    81565154678 HC OT THER PROC EA 15 MIN 6/15/2022 Judy Villatoro OT GO 3    52696149540 HC OT THERAPEUTIC ACT EA 15 MIN 6/15/2022 Judy Villatoro OT GO 2                   Judy Villatoro OT  6/15/2022

## 2022-06-15 NOTE — CONSULTS
PODIATRIC SURGERY CONSULTATION REPORT      Referring Provider: MD  Reason for Consultation: Left foot hx gangrene      Principal problem: <principal problem not specified>    Subjective .     History of present illness:     Mr. Melchor Hardwick is a 57 y.o. male with past medical history significant for gas gangrene with osteomyelitis left foot receiveing IV antibitics and wound care. Podiatric consultatioin for continued pedal care.  Podiatric sx requested for management. History taken from: patientCase was discussed with patient    Review of Systems    Constitutional: No fever, chills or night sweats. No appetite change or unexpected weight change. No fatigue.  Eyes: No eye drainage, itching or redness.  HEENT: No mouth sores, dysphagia or nose bleed.  Respiratory: No shortness of breath, cough or production of sputum.  Cardiovascular: No chest pain, no palpitations, no orthopnea.  Gastrointestinal: No nausea, vomiting or diarrhea. No abdominal pain, hematemesis or rectal bleeding.  Genitourinary: No dysuria or polyuria.  Hematologic/lymphatic: No lymph node abnormalities, no easy bruising or easy bleeding.  Musculoskeletal: No muscle or joint pain.  Skin: right foot Midfoot ampuation skin flap.   Neurological: No loss of consciousness, no seizure, no headache.  Behavioral/Psych: No depression or suicidal ideation.  Endocrine:DM  Immunologic: Negative.    Past Medical History    Past Medical History:   Diagnosis Date   • Diabetes mellitus (HCC)    • Elevated cholesterol    • Hyperlipidemia    • Hypertension        Past Surgical History    Past Surgical History:   Procedure Laterality Date   • ABSCESS DRAINAGE      PERINEUM   • AMPUTATION FOOT Left 5/18/2022    Procedure: AMPUTATION FOOT;  Surgeon: Addison Colby MD;  Location: [facility];  Service: Podiatry;  Laterality: Left;   • INCISION AND DRAINAGE LEG Left 05/10/2022    Procedure: INCISION AND DRAINAGE LOWER EXTREMITY;  Surgeon: Addison Colby MD;  Location:  " [facility];  Service: Podiatry;  Laterality: Left;   • WOUND DEBRIDEMENT Left 05/13/2022    Procedure: DEBRIDEMENT FOOT;  Surgeon: Addison Colby MD;  Location: [facility] OR;  Service: Podiatry;  Laterality: Left;       Family History    No family history on file.    Social History    Social History     Tobacco Use   • Smoking status: Never Smoker   • Smokeless tobacco: Never Used   Substance Use Topics   • Alcohol use: Never   • Drug use: Never       Allergies    Patient has no known allergies.    Objective     /58 (BP Location: Left arm, Patient Position: Sitting)   Pulse 79   Temp 97.9 °F (36.6 °C) (Oral)   Resp 18   Ht 177.8 cm (70\")   Wt 115 kg (253 lb 6.4 oz)   SpO2 95%   BMI 36.36 kg/m²     Temp:  [97.7 °F (36.5 °C)-97.9 °F (36.6 °C)] 97.9 °F (36.6 °C)        Intake/Output Summary (Last 24 hours) at 6/15/2022 1849  Last data filed at 6/15/2022 1700  Gross per 24 hour   Intake 1820 ml   Output 3500 ml   Net -1680 ml         Physical Exam:     General Appearance:  Alert, cooperative, in no acute distress   Head:  Normocephalic, without obvious abnormality, atraumatic   Eyes:            Lids and lashes normal, conjunctivae and sclerae normal,     no icterus, no pallor, corneas clear, PERRLA   Ears:  Ears appear intact with no abnormalities noted   Throat: No oral lesions, no thrush, oral mucosa moist   Neck: No adenopathy, supple, trachea midline, no thyromegaly, no   carotid bruit, no JVD   Back:   No tenderness to percussion or palpation, range of motion   normal   Lungs:   Clear to auscultation,respirations regular, even and unlabored. No wheezing, no rhonchi and no crackles.    Heart:  Regular rhythm and normal rate, normal S1 and S2, no           murmur, no gallop, no rub, no click   Chest Wall:  No abnormalities observed   Abdomen:   Normal bowel sounds, no masses, no organomegaly, soft       non tender, non distended, no guarding, no rebound tenderness   Rectal:   Deferred   Extremities: " "Sutures intact midfoot amputation flap primary closure areas, dry, eschar, no drainage or erythema to entire flap line. Skin pressure erythema anterior ankle over anterior tibialis tendon prominent area.    Pulses: Pulses palpable and equal bilaterally   Skin: See above   Lymph nodes: No palpable adenopathy   Neurologic: Awake, alert and oriented x 3. Following commands.       Results:    Results from last 7 days   Lab Units 06/14/22  0144 06/10/22  0035 06/09/22  0142   WBC 10*3/mm3 3.40 3.60 3.93     Lab Results   Component Value Date    NEUTROABS 1.48 (L) 06/14/2022       Results from last 7 days   Lab Units 06/14/22  0144   CREATININE mg/dL 0.93       Results from last 7 days   Lab Units 06/10/22  0035 06/09/22  0142   CRP mg/dL 3.08* 3.19*       Imaging Results (Last 24 Hours)     ** No results found for the last 24 hours. **            Results Review:    I have personally reviewed laboratory data, culture results, radiology studies and antimicrobial therapy.    Hospital Medications (active)       Dose Frequency Start End    aspirin EC tablet 81 mg 81 mg Daily 6/3/2022     Admin Instructions: Herbal/drug interaction: Avoid use with ginkgo biloba. Do not crush or chew.  Do not exceed 4 grams of aspirin in a 24 hr period.    If given for pain, use the following pain scale:   Mild Pain = Pain Score of 1-3, CPOT 1-2  Moderate Pain = Pain Score of 4-6, CPOT 3-4  Severe Pain = Pain Score of 7-10, CPOT 5-8    Route: Oral    atorvastatin (LIPITOR) tablet 40 mg 40 mg Nightly 6/2/2022     Admin Instructions: Avoid grapefruit juice.    Route: Oral    bisacodyl (DULCOLAX) EC tablet 5 mg 5 mg Daily PRN 6/2/2022     Admin Instructions: Use if no bowel movement after 12 hours.  Swallow whole. Do not crush, split, or chew tablet.    Route: Oral    Linked Group 1: \"And\" Linked Group Details        bisacodyl (DULCOLAX) suppository 10 mg 10 mg Daily PRN 6/2/2022     Admin Instructions: Use if no bowel movement after 12 hours.    " "Route: Rectal    Linked Group 1: \"And\" Linked Group Details        cefdinir (OMNICEF) capsule 300 mg 300 mg Every 12 Hours Scheduled 6/15/2022 6/22/2022    Admin Instructions: Avoid taking antacids, iron, or dairy products within 2 hours of medication.    Route: Oral    dextrose (D50W) (25 g/50 mL) IV injection 25 g 25 g Every 15 Minutes PRN 6/2/2022     Admin Instructions: Blood sugar less than 70; patient has IV access - Unresponsive, NPO or Unable To Safely Swallow    Route: Intravenous    dextrose (GLUTOSE) oral gel 15 g 15 g Every 15 Minutes PRN 6/2/2022     Admin Instructions: BS<70, Patient Alert, Is not NPO, Can safely swallow.    Route: Oral    furosemide (LASIX) tablet 40 mg 40 mg 2 Times Daily (Diuretics) 6/3/2022     Route: Oral    glucagon (human recombinant) (GLUCAGEN DIAGNOSTIC) injection 1 mg 1 mg Every 15 Minutes PRN 6/2/2022     Admin Instructions: Blood Glucose Less Than 70 - Patient Without IV Access - Unresponsive, NPO or Unable To Safely Swallow    Route: Intramuscular    Insulin Aspart (novoLOG) injection 0-7 Units 0-7 Units 3 Times Daily Before Meals 6/2/2022     Admin Instructions: Correction - Low Dose.  Less than 40 units/day total insulin dose or lean, elderly, renal patients    Blood glucose 150-199 mg/dL - 2 units  Blood glucose 200-249 mg/dL - 3 units  Blood glucose 250-299 mg/dL - 4 units  Blood glucose 300-349 mg/dL - 5 units  Blood glucose 350-400 mg/dL - 6 units  Blood glucose greater than 400 mg/dL - 7 units and call provider      Route: Subcutaneous    Insulin Aspart (novoLOG) injection 10 Units 10 Units 3 Times Daily Before Meals 6/15/2022     Admin Instructions:  \"Solution should be clear. If cloudy, contact pharmacy for a new vial.\"      Route: Subcutaneous    insulin detemir (LEVEMIR) injection 10 Units 10 Units Every 12 Hours Scheduled 6/15/2022     Admin Instructions:     Route: Subcutaneous    iron polysaccharides (NIFEREX) capsule 150 mg 150 mg Daily 6/10/2022     Route: " "Oral    lubrisoft lotion  2 Times Daily 6/13/2022     Admin Instructions: Apply to legs    Route: Apply externally    magic barrier cream 1 application 1 application As Needed 6/2/2022     Admin Instructions: For topical use only.  Magic Barrier cream contains vitamin a&d ointment, zinc oxide, clomitrazole, and Maalox.    Route: Topical    melatonin tablet 10 mg 10 mg Nightly PRN 6/2/2022     Route: Oral    metFORMIN (GLUCOPHAGE) tablet 500 mg 500 mg 2 Times Daily With Meals 6/2/2022     Admin Instructions: Hold metformin for 48 hours after iodinated intravenous contrast. Confirm renal function is normal before continuing.  Caution: Look alike/sound alike drug alert    Route: Oral    multivitamin (THERAGRAN) tablet 1 tablet 1 tablet Daily 6/3/2022     Admin Instructions:     Route: Oral    nadolol (CORGARD) tablet 20 mg 20 mg Every 24 Hours Scheduled 6/3/2022     Route: Oral    nystatin (MYCOSTATIN) powder  Every 12 Hours Scheduled 6/2/2022     Route: Topical    pantoprazole (PROTONIX) EC tablet 40 mg 40 mg Every Early Morning 6/3/2022     Admin Instructions: Swallow whole; do not crush, split, or chew.    Route: Oral    polyethylene glycol (MIRALAX) packet 17 g 17 g Daily PRN 6/2/2022     Admin Instructions: Use if no bowel movement after 12 hours. Mix in 6-8 ounces of water.  Use 4-8 ounces of water, tea, or juice for each 17 gram dose.    Route: Oral    Linked Group 1: \"And\" Linked Group Details        potassium chloride (KLOR-CON) packet 40 mEq 40 mEq As Needed 6/4/2022     Admin Instructions: Potassium 3.1 or Less Give KCl 40 mEq q4h x3 Doses   Potassium 3.2 - 3.6 Give KCl 40 mEq q4h x2 Doses     Check Potassium 4 Hours After Last Dose Given   Check Magnesium if Potassium Level Remains Low After Replacement   DO NOT GIVE if CrCl is Less Than 30 mL/minute or Urine Output Less Than 30 mL/hr    Route: Oral    Linked Group 2: \"Or\" Linked Group Details        potassium chloride (MICRO-K) CR capsule 40 mEq 40 mEq As " "Needed 6/4/2022     Admin Instructions: Potassium 3.1 or Less Give KCl 40 mEq q4h x3 Doses   Potassium 3.2 - 3.6 Give KCl 40 mEq q4h x2 Doses     Check Potassium 4 Hours After Last Dose Given   Check Magnesium if Potassium Level Remains Low After Replacement   DO NOT GIVE if CrCl is Less Than 30 mL/minute or Urine Output Less Than 30 mL/hr    Route: Oral    Linked Group 2: \"Or\" Linked Group Details        potassium chloride 10 mEq in 100 mL IVPB 10 mEq Every 1 Hour PRN 6/4/2022     Admin Instructions: Peripheral or Central IV  Potassium 3.1 or Less Give KCl 10 mEq/100 mL NS IV q1h x6 Doses  Potassium 3.2 - 3.6 Give KCl 10 mEq/100 mL NS q1h x4 Doses    Check Potassium 4 Hours After Last Dose Given  Check Magnesium if Potassium Remains Low After Replacement  DO NOT GIVE if CrCl is Less Than 30 mL/minute or Urine Output Less Than 30 mL/hr.     Rates Greater Than 10 mEq/hr Require ECG Monitoring.  OUTPATIENT/NON-MONITORED UNITS: Potassium Chloride standard bolus infusion rate is a maximum of 10 mEq/hr on unmonitored patients    MONITORED UNITS: Potassium Chloride standard bolus infusion rate is a maximum of 20 mEq/hr on ECG monitored patients ONLY      Route: Intravenous    Linked Group 2: \"Or\" Linked Group Details        sennosides-docusate (PERICOLACE) 8.6-50 MG per tablet 2 tablet 2 tablet 2 Times Daily 6/2/2022     Admin Instructions: HOLD MEDICATION IF PATIENT HAS HAD BOWEL MOVEMENT. Start bowel management regimen if patient has not had a bowel movement after 12 hours.    Route: Oral    Linked Group 1: \"And\" Linked Group Details        sodium chloride 0.9 % flush 10 mL 10 mL As Needed 6/2/2022     Route: Intravenous    sodium chloride 0.9 % flush 10 mL 10 mL Every 12 Hours Scheduled 6/2/2022     Route: Intravenous    sodium chloride 0.9 % flush 10 mL 10 mL As Needed 6/2/2022     Route: Intravenous    sodium chloride 0.9 % flush 10 mL 10 mL Every 12 Hours Scheduled 6/2/2022     Route: Intravenous    sodium chloride " 0.9 % flush 10 mL 10 mL As Needed 6/2/2022     Route: Intravenous    spironolactone (ALDACTONE) tablet 50 mg 50 mg Daily 6/3/2022     Route: Oral    vancomycin 1500 mg/500 mL 0.9% NS IVPB ([facility]) 1,500 mg Every 12 Hours 6/10/2022 6/23/2022    Route: Intravenous            PROBLEM LIST:    Patient Active Problem List   Diagnosis   • Hyperlipidemia   • Hypertensive disorder   • Kidney disease   • Obesity   • Type 2 diabetes mellitus (HCC)   • Gas gangrene of foot (HCC)   • Cellulitis in diabetic foot (HCC)   • Other acute osteomyelitis of left foot (HCC)   • Osteomyelitis (HCC)   • Critical illness myopathy       [unfilled]    ASSESSMENT:  HX 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   DM with PN    PLAN:    Appropriate sutures removed today. Epithelialization with natural eschar slough needs to occur before remaining sutures removed without risk of eschar pull part/dehiscence. Continue wound care with antibiotics IV to po conversion per culture. Heel wb for transfers otherwise non wb. Will convert to dorsiflexory night splint to prevent equinas contracture. Patient's findings and recommendations were discussed with patient    Code Status:   Code Status and Medical Interventions:   Ordered at: 06/02/22 9552     Level Of Support Discussed With:    Patient     Code Status (Patient has no pulse and is not breathing):    CPR (Attempt to Resuscitate)     Medical Interventions (Patient has pulse or is breathing):    Full Support       Addison Colby MD  06/15/22  18:49 EDT

## 2022-06-15 NOTE — SIGNIFICANT NOTE
06/15/22 1230   Plan   Plan Contacted [facility] [phone] per preference per Brii with referral, discharge on 6-16-22, need for I.V. Vancomycin 1500 mg every 12 hours until 6-23-22 (9am/9pm), wound care to left midfoot amputation site: clean with betadine, 4x4's, kerlix, ace wrap daily, routine midline care, PT 2-3 x wk for strengthening, endurance, gait training, transfer training, balance, therapeutic exercise, bed mobility, home safety, OT 2-3 x wk for ADL re-training, home safety, functional mobility, and strengthening.  Faxed face sheet, H&P, PT/OT notes, and MD progress notes to  via epic.  HH to be contacted at discharge.  Ambulatory referral for home health with face to face to be faxed at discharge.  Contacted [facility] per rotation per Allen [phone] with discharge on 6-16-22 and need forI.V. Vancomycin 1500 mg every 12 hours until 6-23-22.  Pt received I.V. Daptomycin from them recently.  Faxed face sheet, H&P, MD progress note, pharmacist progress note, Vanco trough level on 6-12-22 and medication list to [phone].  SS to contact [facility] on 6-16-22 about coverage and delivery to sister's home.

## 2022-06-15 NOTE — PAYOR COMM NOTE
"Jaja Ramirez, RN   Utilization Review Nurse for Inpatient Rehab   Phone: [phone]  Fax: [phone]  Email: [email or web address]  [facility]  Facility NPI: [id number]    CLINICAL REVIEW FOR ACUTE INPATIENT REHAB  Member:  Melchor Hardwick  ID# [id number]  Ref# [id number]  Admission Date:  06/02/22  Planning for Discharge home on 6/16/22    Melchor Hardwick (57 y.o. Male)             Date of Birth   1965    Social Security Number       Address   [address] [id number]    Home Phone   [phone]    MRN   [id number]       Mandaen   [facility]    Marital Status   Single                            Admission Date   6/2/22    Admission Type   Elective    Admitting Provider   Joe Mike MD    Attending Provider   Joe Mike MD    Department, Room/Bed   [facility] REHABILITATION, 109/2S       Discharge Date       Discharge Disposition       Discharge Destination                               Attending Provider: Joe Mike MD    Allergies: No Known Allergies    Isolation: None   Infection: MRSA No Isolation this Admit (05/22/22)   Code Status: CPR   Advance Care Planning Activity    Ht: 177.8 cm (70\")   Wt: 115 kg (253 lb 6.4 oz)    Admission Cmt: None   Principal Problem: None              Corrina Mendez MSW       Case Management   Significant Note      Signed   Date of Service:  06/14/22 1802   Creation Time:  06/14/22 1802              Signed              Show:Clear all  []Manual[x]Template[]Copied    Added by:  [x]Corrina Mendez MSW      []Eamon for details         06/14/22 2148   Plan   Plan Team conference held today.  Spoke to pt about plans for discharge on 6-16-22, recommendation for home health PT, OT, nursing for wound care, I.V Vancomycin 1500 mg every 12 hours until 6-23-22, routine midline care, and wound care to left midfoot amputation site, rolling walker, bedside commode, hospital bed, 22 inch heavy duty wheelchair with " ELR, anti-tippers, basic cushion.  Pt prefers [facility].  Pt has no preference for home infusion pharmacy.  Pt is going home with sister Ros at discharge who will assist with wound care and home I.V. antibiotics.   Patient/Family in Agreement with Plan yes                 Joe Mike MD    Physician   Medicine   Progress Notes      Signed   Date of Service:  22   Creation Time:  22              Signed        Expand All Collapse All             [facility]  PROGRESS NOTE     Patient Identification:  Name:  Melchor Hardwick  Age:  57 y.o.  Sex:  male  :  1965  MRN:  [id number]  Visit Number:  [id number]  ROOM: 109/      Primary Care Provider:  Missy Up APRN     Length of stay in inpatient status:  12     Subjective      Chief Compliant:  No chief complaint on file.        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  Patient is status post left midfoot amputation secondary to osteomyelitis and Staph aureus bacteremia.  Patient is also been diagnosed with cirrhosis secondary to [place], diabetes mellitus, anemia, thrombocytopenia, hypertension, hyperlipidemia and edema.  Patient has tolerated removal of Ortiz catheter well and is voiding on own.     Objective      Current Hospital Meds:aspirin, 81 mg, Oral, Daily  atorvastatin, 40 mg, Oral, Nightly  furosemide, 40 mg, Oral, BID  Insulin Aspart, 0-7 Units, Subcutaneous, TID AC  Insulin Aspart, 15 Units, Subcutaneous, TID AC  insulin detemir, 20 Units, Subcutaneous, Q12H  iron polysaccharides, 150 mg, Oral, Daily  lubrisoft, , Apply externally, BID  metFORMIN, 500 mg, Oral, BID With Meals  multivitamin, 1 tablet, Oral, Daily  nadolol, 20 mg, Oral, Q24H  nystatin, , Topical, Q12H  pantoprazole, 40 mg, Oral, Q AM  senna-docusate sodium, 2 tablet, Oral, BID  sodium chloride, 10 mL, Intravenous, Q12H  sodium chloride, 10 mL, Intravenous, Q12H  spironolactone, 50  mg, Oral, Daily  vancomycin, 1,500 mg, Intravenous, Q12H     ----------------------------------------------------------------------------------------------------------------------  Vital Signs:  Temp:  [98 °F (36.7 °C)-98.9 °F (37.2 °C)] 98.9 °F (37.2 °C)  Heart Rate:  [73] 73  Resp:  [18] 18  BP: (109-131)/(60-67) 131/67  SpO2:  [95 %] 95 %  on   ;   Device (Oxygen Therapy): room air  Body mass index is 36.36 kg/m².         Wt Readings from Last 3 Encounters:   06/12/22 115 kg (253 lb 6.4 oz)   06/02/22 117 kg (258 lb)               Intake & Output (last 3 days)        06/11 0701 06/12 0700 06/12 0701 06/13 0700 06/13 0701 06/14 0700 06/14 0701  06/15 0700     P.O. 1800 1200 1200 240     IV Piggyback 500 500 500 500     Total Intake(mL/kg) 2300 (20) 1700 (14.8) 1700 (14.8) 740 (6.4)     Urine (mL/kg/hr) 4000 (1.4) 2700 (1) 3825 (1.4) 450 (0.7)     Stool 0 0 0       Total Output 4000 2700 3825 450     Net -1700 -1000 -2125 +290                   Urine Unmeasured Occurrence 1 x           Stool Unmeasured Occurrence 2 x 1 x 1 x            Diet Regular; Cardiac, Consistent Carbohydrate, Daily Fluid Restriction; 1500 mL Fluid Per Day; High Protein  ----------------------------------------------------------------------------------------------------------------------  Physical exam:  Constitutional:   Overweight gentleman in no distress  HEENT: Normocephalic atraumatic  Neck: Supple   Cardiovascular: Regular rate and rhythm  Pulmonary/Chest: Clear to auscultation  Abdominal: Positive bowel sounds soft.   Musculoskeletal: Left foot amputation--wound is dressed  Neurological: No focal deficits  Skin: No rash  Peripheral vascular:  Genitourinary:  ----------------------------------------------------------------------------------------------------------------------     Last echocardiogram:  Results for orders placed during the hospital encounter of 05/10/22     Adult Transthoracic Echo Complete W/ Cont if Necessary Per  Protocol     Interpretation Summary  · Normal left ventricular cavity size and wall thickness noted. All left ventricular wall segments contract normally.  · Left ventricular ejection fraction appears to be 61 - 65%.  · The mitral valve is structurally normal with no significant stenosis present. Trace mitral valve regurgitation is present.  · The aortic valve is structurally normal with no regurgitation or stenosis present.  · There is no evidence of pericardial effusion.     ----------------------------------------------------------------------------------------------------------------------          Results from last 7 days   Lab Units 06/14/22  0144 06/10/22  0035 06/09/22  1604 06/09/22  0142   CRP mg/dL  --  3.08*  --  3.19*   WBC 10*3/mm3 3.40 3.60  --  3.93   HEMOGLOBIN g/dL 8.7* 8.9*  --  8.8*   HEMATOCRIT % 28.1* 29.5*  --  28.1*   MCV fL 81.2 81.9  --  79.4   MCHC g/dL 31.0* 30.2*  --  31.3*   PLATELETS 10*3/mm3 76* 81*  --  88*   INR    --   --  1.06  --                  Results from last 7 days   Lab Units 06/14/22  0144 06/10/22  0035 06/09/22  0142   SODIUM mmol/L 135* 134* 133*   POTASSIUM mmol/L 3.5 4.0 3.5   CHLORIDE mmol/L 102 100 99   CO2 mmol/L 23.9 24.8 25.0   BUN mg/dL 22* 18 17   CREATININE mg/dL 0.93 0.71* 0.70*   CALCIUM mg/dL 7.6* 7.8* 7.8*   GLUCOSE mg/dL 138* 174* 159*   ALBUMIN g/dL 1.96* 1.95* 1.87*   BILIRUBIN mg/dL 0.3 0.4 0.4   ALK PHOS U/L 264* 295* 275*   AST (SGOT) U/L 40 84* 74*   ALT (SGPT) U/L 38 53* 48*   Estimated Creatinine Clearance: 111.3 mL/min (by C-G formula based on SCr of 0.93 mg/dL).  No results found for: AMMONIA                      Glucose   Date/Time Value Ref Range Status   06/14/2022 1131 214 (H) 70 - 130 mg/dL Final       Comment:       Meter: [id number] : [id number] Penelope Dawson   06/14/2022 0930 161 (H) 70 - 130 mg/dL Final       Comment:       Meter: [id number] : [id number] Steve Yancey   06/14/2022 0554 95 70 - 130 mg/dL Final        Comment:       Meter: [id number] : [id number] [place]   06/13/2022 1940 129 70 - 130 mg/dL Final       Comment:       Meter: [id number] : [id number] [place]   06/13/2022 1615 108 70 - 130 mg/dL Final       Comment:       Meter: [id number] : [id number] Penelope Eunice   06/13/2022 1131 235 (H) 70 - 130 mg/dL Final       Comment:       Meter: [id number] : [id number] Negrete Eunice   06/13/2022 0625 195 (H) 70 - 130 mg/dL Final       Comment:       Meter: [id number] : [id number] Tracey Crystal   06/12/2022 2008 226 (H) 70 - 130 mg/dL Final       Comment:       Meter: [id number] : [id number] [place]            Lab Results   Component Value Date     TSH 4.700 (H) 05/30/2022     FREET4 0.87 (L) 05/30/2022      No results found for: PREGTESTUR, PREGSERUM, HCG, HCGQUANT          Pain Management Panel         Pain Management Panel Latest Ref Rng & Units 5/24/2022 5/11/2022     CREATININE UR mg/dL 91.0 -     AMPHETAMINES SCREEN, URINE Negative - Negative     BARBITURATES SCREEN Negative - Negative     BENZODIAZEPINE SCREEN, URINE Negative - Negative     BUPRENORPHINEUR Negative - Negative     COCAINE SCREEN, URINE Negative - Negative     METHADONE SCREEN, URINE Negative - Negative     METHAMPHETAMINEUR Negative - Negative                                   Brief Urine Lab Results  (Last result in the past 365 days)       Color   Clarity   Blood   Leuk Est   Nitrite   Protein   CREAT   Urine HCG         05/24/22 1839                         91.0                   No results found for: BLOODCX      No results found for: URINECX  No results found for: WOUNDCX  No results found for: STOOLCX        Results from last 7 days   Lab Units 06/10/22  0035 06/09/22  0142   CRP mg/dL 3.08* 3.19*         I have personally looked at the labs and they are summarized above.  ----------------------------------------------------------------------------------------------------------------------  Detailed  radiology reports for the last 24 hours:         Imaging Results (Last 24 Hours)      ** No results found for the last 24 hours. **          Final impressions for the last 30 days of radiology reports:     CT Abdomen Pelvis Without Contrast     Result Date: 5/25/2022  1.  Advanced liver cirrhosis with changes of portal hypertension which include spinal megaly, prominent portosystemic collateral vessels, small-moderate volume ascites, and marked anasarca. 2.  Moderate constipation and mild generalized ileus. No bowel obstruction. 3.  Miniscule pleural effusions. 4.  Other incidental and nonacute findings detailed above.  This report was finalized on 5/25/2022 8:48 AM by Dr. Brain Rodriguez MD.       US Abdomen Complete     Result Date: 5/20/2022  1. Splenomegaly. 2. Small volume ascites.  This report was finalized on 5/20/2022 12:59 PM by Dr. Lobo Ring MD.       US Scrotum & Testicles     Result Date: 5/20/2022  Extensive scrotal wall swelling.   This report was finalized on 5/20/2022 12:59 PM by Dr. Lobo Ring MD.       US Scrotum & Testicles     Result Date: 5/16/2022  Scrotal wall thickening  This report was finalized on 5/16/2022 4:57 PM by Dr. Lobo Ring MD.       MRI Foot Left Without Contrast     Result Date: 5/16/2022  Appearance concerning for osteomyelitis involving the 3rd metatarsal and likely the adjacent cuneiform.  This report was finalized on 5/16/2022 3:10 PM by Dr. Lobo Ring MD.       US Venous Doppler Lower Extremity Bilateral (duplex)     Result Date: 5/24/2022  No sonographic findings of DVT in the lower extremities.  This report was finalized on 5/24/2022 8:05 AM by Dr. Garry Lovelace II, MD.       XR Foreign Body For MRI     Result Date: 5/16/2022  No radiopaque foreign body identified  This report was finalized on 5/16/2022 10:20 AM by Dr. Lobo Ring MD.       I have personally looked at the radiology images and read the final radiology report.     Assessment & Plan    Critical  illness myopathy--today patient was able to do bed mobility standby assistance.  They have assistance for transfers; ambulated 20 feet x 2 with front wheel walker.  Yesterday patient was able to do grooming with modified independence.  Did work on motor skills.     Recent osteomyelitis necessitating midfoot amputation and Staph aureus bacteremia--continue vancomycin through      Number cytopenia slightly worsened today.  Continue supportive care     Diabetes mellitus continue current treatment     Anemia stable     Hypertension continue current treatment     VTE Prophylaxis:   Mechanical Order History:               Ordered          22   Place Sequential Compression Device  Once              22   Maintain Sequential Compression Device  Continuous              22   Maintain Sequential Compression Device  Continuous                               Pharmalogical Order History:                   Ordered     Dose Route Frequency Stop      22   Enoxaparin Sodium (LOVENOX) syringe 40 mg  Status:  Discontinued         40 mg SC Nightly 22                        Joe Mike MD  [facility] Hospitalist  22  12:20 Willow Carrera, PT    Physical Therapist   Specialty:  Physical Therapy   Therapy Treatment Note      Signed   Date of Service:  22   Creation Time:  22              Signed        Expand All Collapse All        Inpatient Rehabilitation - Physical Therapy Treatment Note                                                               [place]     Patient Name: Melchor Hardwick             : 1965                      MRN: [id number]     Today's Date: 2022                                                                                            Admit Date: 2022                 Visit Dx:   Visit Diagnosis   No diagnosis found.        Problem List       Patient Active Problem List    Diagnosis   • Hyperlipidemia   • Hypertensive disorder   • Kidney disease   • Obesity   • Type 2 diabetes mellitus (HCC)   • Gas gangrene of foot (HCC)   • Cellulitis in diabetic foot (HCC)   • Other acute osteomyelitis of left foot (HCC)   • Osteomyelitis (HCC)   • Critical illness myopathy            Medical History        Past Medical History:   Diagnosis Date   • Diabetes mellitus (HCC)     • Elevated cholesterol     • Hyperlipidemia     • Hypertension              Surgical History   Past Surgical History:   Procedure Laterality Date   • ABSCESS DRAINAGE         PERINEUM   • AMPUTATION FOOT Left 5/18/2022     Procedure: AMPUTATION FOOT;  Surgeon: Addison Colby MD;  Location: [facility];  Service: Podiatry;  Laterality: Left;   • INCISION AND DRAINAGE LEG Left 05/10/2022     Procedure: INCISION AND DRAINAGE LOWER EXTREMITY;  Surgeon: Addison Colby MD;  Location: [facility];  Service: Podiatry;  Laterality: Left;   • WOUND DEBRIDEMENT Left 05/13/2022     Procedure: DEBRIDEMENT FOOT;  Surgeon: Addison Colby MD;  Location: [facility];  Service: Podiatry;  Laterality: Left;            PT ASSESSMENT (last 12 hours)                IRF PT Evaluation and Treatment             Row Name 06/14/22 1504 06/14/22 1154              PT Time and Intention      Document Type daily treatment  am session  -LB daily treatment  second treatment session  -AG      Mode of Treatment individual therapy;physical therapy  -LB physical therapy;individual therapy  -AG      Patient/Family/Caregiver Comments/Observations -- pt. supine with IV vancomycin in place.  L foot splinted.  -AG             Row Name 06/14/22 1504 06/14/22 1155              General Information      Patient Profile Reviewed yes  -LB yes  -AG      Existing Precautions/Restrictions fall;non-weight bearing  <skin integrity L foot  -LB fall;left;non-weight bearing  -AG             Row Name 06/14/22 1154                   Pain Assessment      Additional Documentation --   pt. had no c/o pain during treatment session  -AG               Row Name 06/14/22 1504 06/14/22 1154              Pain Scale: FACES Pre/Post-Treatment      Pain: FACES Scale, Pretreatment 4-->hurts little more  -LB 4-->hurts little more  -AG      Posttreatment Pain Rating 4-->hurts little more  -LB 4-->hurts little more  -AG      Pain Location - Side/Orientation -- Left  -AG      Pain Location - --  left foot  -LB foot  -AG      Pre/Posttreatment Pain Comment rest, repositioned  -LB --             Row Name 06/14/22 1504 06/14/22 1154              Cognition/Psychosocial      Affect/Mental Status (Cognition) WFL  -LB WNL  -AG      Orientation Status (Cognition) -- oriented x 4  -AG      Follows Commands (Cognition) WFL  -LB WNL  -AG      Personal Safety Interventions gait belt;nonskid shoes/slippers when out of bed;supervised activity  -LB fall prevention program maintained;gait belt;nonskid shoes/slippers when out of bed;supervised activity  -AG             Row Name 06/14/22 1504 06/14/22 1154              Mobility      Left Lower Extremity (Weight-bearing Status) non weight-bearing (NWB)  -LB non weight-bearing (NWB)  -AG      Right Lower Extremity (Weight-bearing Status) weight-bearing as tolerated (WBAT)  -LB --             [facility] Name 06/14/22 1504 06/14/22 1154              Bed Mobility      Rolling Left [place] (Bed Mobility) -- standby assist  -AG      Supine-Sit [place] (Bed Mobility) -- standby assist  -AG      Assistive Device (Bed Mobility) -- bed rails  -AG      Comment, (Bed Mobility) not observed  -LB --             [facility] Name 06/14/22 1154                   Transfer Assessment/Treatment      Transfers stand pivot/stand step transfer;sit-stand transfer;stand-sit transfer  -AG               Row Name 06/14/22 1504 06/14/22 1154              Transfers      Bed-Chair [place] (Transfers) -- verbal cues;nonverbal cues (demo/gesture);contact guard  -AG      Chair-Bed [place] (Transfers) contact  guard;verbal cues;nonverbal cues (demo/gesture)  -LB --      Assistive Device (Bed-Chair Transfers) -- wheelchair  -AG      Sit-Stand [place] (Transfers) verbal cues;nonverbal cues (demo/gesture);contact guard  -LB verbal cues;nonverbal cues (demo/gesture);contact guard;standby assist  -AG      Stand-Sit [place] (Transfers) verbal cues;nonverbal cues (demo/gesture);contact guard  -LB standby assist;verbal cues;nonverbal cues (demo/gesture);contact guard  -AG             Row Name 06/14/22 1504                   Chair-Bed Transfer      Assistive Device (Chair-Bed Transfers) wheelchair  -LB               Row Name 06/14/22 1504 06/14/22 1154              Sit-Stand Transfer      Assistive Device (Sit-Stand Transfers) walker, front-wheeled;wheelchair  -LB parallel bars;walker, front-wheeled  -AG             Row Name 06/14/22 1504 06/14/22 1154              Stand-Sit Transfer      Assistive Device (Stand-Sit Transfers) walker, front-wheeled;wheelchair  -LB parallel bars;walker, front-wheeled  -AG             Row Name 06/14/22 1154                   Stand Pivot/Stand Step Transfer      Stand Pivot/Stand Step [place] (Transfers) verbal cues;nonverbal cues (demo/gesture);contact guard  -AG        Assistive Device (Stand Pivot Stand Step Transfer) wheelchair  -AG               Row Name 06/14/22 1504 06/14/22 1154              Gait/Stairs (Locomotion)      Gait/Stairs Locomotion -- gait/ambulation assistive device;gait/ambulation independence;distance ambulated;gait pattern;gait deviations;maintains weight-bearing status  -AG      [place] Level (Gait) contact guard;verbal cues;nonverbal cues (demo/gesture)  -LB verbal cues;nonverbal cues (demo/gesture);contact guard  -AG      Assistive Device (Gait) walker, front-wheeled  -LB walker, front-wheeled  -AG      Distance in Feet (Gait) 20' 20' 35'  -LB 20 ft x 2  -AG      Pattern (Gait) -- swing-to  -AG      Deviations/Abnormal Patterns (Gait) antalgic;yao  decreased;gait speed decreased;stride length decreased  -LB stride length decreased;gait speed decreased  -AG      Bilateral Gait Deviations forward flexed posture  -LB --             Row Name 06/14/22 1154                   Safety Issues, Functional Mobility      Impairments Affecting Function (Mobility) balance;endurance/activity tolerance;pain;strength  -AG               Row Name 06/14/22 1154                   Balance      Balance Assessment sitting static balance;sitting dynamic balance;sit to stand dynamic balance;standing static balance;standing dynamic balance  -AG        Static Sitting Balance independent  -AG        Position, Sitting Balance sitting edge of bed;sitting in chair  -AG        Static Standing Balance verbal cues;non-verbal cues (demo/gesture);contact guard;standby assist  -AG        Dynamic Standing Balance verbal cues;non-verbal cues (demo/gesture);contact guard  -        Position/Device Used, Standing Balance walker, front-wheeled  -               Row Name 06/14/22 1504                   Motor Skills      Therapeutic Exercise --  sitting ex  -LB               Row Name 06/14/22 1154                   Hip (Therapeutic Exercise)      Hip Strengthening (Therapeutic Exercise) bilateral;flexion;extension;aBduction;aDduction;marching while seated;marching while standing;sitting;standing;3 lb free weight;resistance band;green  performed sitting, standing L hip TE  -AG               Row Name 06/14/22 1154                   Knee (Therapeutic Exercise)      Knee Strengthening (Therapeutic Exercise) bilateral;flexion;extension;marching while seated;marching while standing;LAQ (long arc quad);hamstring curls;sitting;standing;3 lb free weight;resistance band;green  L TE in standing, sitting  -AG               Row Name 06/14/22 1154                   Ankle (Therapeutic Exercise)      Ankle Strengthening (Therapeutic Exercise) right;dorsiflexion;plantarflexion;standing  -               Row Name  06/14/22 1154                   Family/Support System      Family/Support System Care self-care encouraged  -AG               Row Name 06/14/22 1154                   Coping Strategies      Trust Relationship/Rapport care explained;choices provided;thoughts/feelings acknowledged  -AG               Row Name 06/14/22 1504 06/14/22 1154              Positioning and Restraints      Pre-Treatment Position sitting in chair/recliner  -LB in bed  -AG      Post Treatment Position -- wheelchair  -AG      In Bed sitting EOB;call light within reach;encouraged to call for assist  -LB --      In Wheelchair -- sitting;call light within reach;encouraged to call for assist  -AG             Row Name 06/14/22 1154                   Therapy Assessment/Plan (PT)      Patient's Goals For Discharge return home;take care of myself at home  -AG               Row Name 06/14/22 1504 06/14/22 1154              Daily Progress Summary (PT)      Functional Goal Overall Progress (PT) -- progressing toward functional goals as expected  -AG      Daily Progress Summary (PT) -- demonstrating more independent functional transfers  -AG      Impairments Still Limiting Function (PT) -- balance impairment;functional activity tolerance impairment;strength deficit;pain  -AG      Recommendations (PT) continue PT  -LB continue POC  -AG             Row Name 06/14/22 1154                   Therapy Plan Review/Discharge Plan (PT)      Therapy Plan Review (PT) evaluation/treatment results reviewed;care plan/treatment goals reviewed;risks/benefits reviewed;current/potential barriers reviewed;participants voiced agreement with care plan;participants included;patient  -AG        Anticipated Discharge Disposition (PT) home with home health  -AG                        User Key  (r) = Recorded By, (t) = Taken By, (c) = Cosigned By             Initials Name Provider Type      Willow Spencer, PT Physical Therapist      Joseline Kay, PT Physical Therapist                           Wound 05/16/22 1340 coccyx Pressure Injury (Active)   Dressing Appearance open to air 06/13/22 1925   Closure None 06/13/22 1925   Base blanchable;red 06/14/22 1010   Periwound redness 06/14/22 1010   Periwound Temperature warm 06/14/22 1010   Edges irregular 06/14/22 1010   Drainage Amount none 06/13/22 1925   Care, Wound barrier applied 06/14/22 1010       Wound 05/18/22 1545 Left other (see comments) foot Incision (Active)   Dressing Appearance dry;intact 06/14/22 0404   Closure Sutures;Staples 06/14/22 0404   Base dressing in place, unable to visualize 06/14/22 1010   Drainage Amount none 06/14/22 0404   Care, Wound cleansed with 06/14/22 0404   Dressing Care dressing changed 06/14/22 0404       Wound 06/02/22 1240 scrotum MASD (Moisture associated skin damage) (Active)   Dressing Appearance open to air 06/14/22 1010   Base red 06/14/22 1010   Care, Wound barrier applied 06/14/22 1010           Physical Therapy Education                              Title: PT OT SLP Therapies (Done)                Topic: Physical Therapy (Done)                Point: Mobility training (Done)                Learning Progress Summary            Patient Acceptance, E, VU,NR by LB at 6/14/2022 1513      Show all documentation for this point (12)                                 Point: Home exercise program (Done)                Learning Progress Summary            Patient Acceptance, E, VU,NR by LB at 6/14/2022 1513      Show all documentation for this point (12)                                 Point: Body mechanics (Done)                Learning Progress Summary            Patient Acceptance, E, VU,NR by LB at 6/14/2022 1513      Show all documentation for this point (12)                                 Point: Precautions (Done)                Learning Progress Summary            Patient Acceptance, E, VU,NR by LB at 6/14/2022 1513      Show all documentation for this point (12)                                                User Key               Initials Effective Dates Name Provider Type Discipline      LB 21 -  Willow Villalta, PT Physical Therapist PT                        PT Recommendation and Plan     Planned Therapy Interventions (PT): balance training, bed mobility training, gait training, patient/family education, ROM (range of motion), strengthening, transfer training, wheelchair management/propulsion training  Frequency of Treatment (PT): 5 times per week  Anticipated Equipment Needs at Discharge (PT Eval):  (tbd)  Daily Progress Summary (PT)  Recommendations (PT): continue PT                           Time Calculation:                PT Charges              Row Name 22 1513 22 1153                    Time Calculation      Start Time 0915  -LB 1055  -AG        Stop Time 1000  -LB 1140  -AG        Time Calculation (min) 45 min  -LB 45 min  -AG        PT Received On 22  -LB 22  -AG        PT - Next Appointment -- 06/15/22  -AG                      Mandi Smith, OT    Occupational Therapist   Occupational Therapy   Therapy Treatment Note      Signed   Date of Service:  22   Creation Time:  22              Signed        Expand All Collapse All        Inpatient Rehabilitation - Occupational Therapy Treatment Note     YVONNE Gaines     Patient Name: Melchor Hardwick             : 1965                      MRN: [id number]     Today's Date: 2022                                                                             Admit Date: 2022        Visit Diagnosis   No diagnosis found.        Problem List       Patient Active Problem List   Diagnosis   • Hyperlipidemia   • Hypertensive disorder   • Kidney disease   • Obesity   • Type 2 diabetes mellitus (HCC)   • Gas gangrene of foot (HCC)   • Cellulitis in diabetic foot (HCC)   • Other acute osteomyelitis of left foot (HCC)   • Osteomyelitis (HCC)   • Critical illness myopathy             Medical History        Past Medical History:   Diagnosis Date   • Diabetes mellitus (HCC)     • Elevated cholesterol     • Hyperlipidemia     • Hypertension              Surgical History         Past Surgical History:   Procedure Laterality Date   • ABSCESS DRAINAGE         PERINEUM   • AMPUTATION FOOT Left 5/18/2022     Procedure: AMPUTATION FOOT;  Surgeon: Addison Colby MD;  Location: [facility];  Service: Podiatry;  Laterality: Left;   • INCISION AND DRAINAGE LEG Left 05/10/2022     Procedure: INCISION AND DRAINAGE LOWER EXTREMITY;  Surgeon: Addison Colby MD;  Location: [facility] OR;  Service: Podiatry;  Laterality: Left;   • WOUND DEBRIDEMENT Left 05/13/2022     Procedure: DEBRIDEMENT FOOT;  Surgeon: Addison Colby MD;  Location: [facility] OR;  Service: Podiatry;  Laterality: Left;                               IRF OT ASSESSMENT FLOWSHEET (last 12 hours)                IRF OT Evaluation and Treatment             Row Name 06/14/22 1430                   OT Time and Intention      Document Type daily treatment  -BF        Mode of Treatment occupational therapy  -BF        Patient Effort good  -BF        Symptoms Noted During/After Treatment none  -BF               Row Name 06/14/22 1430                   General Information      Existing Precautions/Restrictions fall;left;non-weight bearing  <  skin integrity  -BF               Row Name 06/14/22 1430                   Cognition/Psychosocial      Orientation Status (Cognition) oriented x 4  -BF        Follows Commands (Cognition) WFL  -BF               Row Name 06/14/22 1430                   Bathing      Sac Level (Bathing) set up;supervision  -BF        Position (Bathing) supported sitting  -BF        Comment (Bathing) Set-up/supervision  -BF               Row Name 06/14/22 1430                   Upper Body Dressing      Sac Level (Upper Body Dressing) set up assistance  -BF        Position (Upper Body Dressing) edge of bed sitting  -BF         Comment (Upper Body Dressing) Set-up  -BF               Row Name 06/14/22 1430                   Lower Body Dressing      Mar[place] Level (Lower Body Dressing) standby assist  -BF        Position (Lower Body Dressing) edge of bed sitting  -BF        Comment (Lower Body Dressing) SBA  -BF               Row Name 06/14/22 1430                   Grooming      Comment (Grooming) Mod I  -BF               Row Name 06/14/22 1430                   Toileting      Comment (Toileting) Min A  -BF               Row Name 06/14/22 1430                   Transfers      Bed-Chair [place] (Transfers) contact guard;verbal cues;nonverbal cues (demo/gesture)  -BF        Assistive Device (Bed-Chair Transfers) wheelchair  -BF               Row Name 06/14/22 1430                   Motor Skills      Motor Control/Coordination Interventions gross motor coordination activities;therapeutic exercise/ROM  BUE GMC therex, strengthening; BUE PRE 30 mins, BUE rickshaw 30 lbs X20X6; wrist rolls X2  -BF               Row Name 06/14/22 1430                   Positioning and Restraints      In Bed sitting EOB;call light within reach;encouraged to call for assist  In PM  -BF        In Wheelchair sitting;with PT  In AM  -BF                        User Key  (r) = Recorded By, (t) = Taken By, (c) = Cosigned By             Initials Name Effective Dates       Mandi Smith OT 06/16/21 -                              Occupational Therapy Education                              Title: PT OT SLP Therapies (Done)                Topic: Occupational Therapy (Done)                Point: ADL training (Done)           Description:   Instruct learner(s) on proper safety adaptation and remediation techniques during self care or transfers.   Instruct in proper use of assistive devices.                                  Learning Progress Summary            Patient Acceptance, E, VU,NR by  at 6/14/2022 1430      Show all documentation for this point  (10)                                 Point: Precautions (Done)           Description:   Instruct learner(s) on prescribed precautions during self-care and functional transfers.                                  Learning Progress Summary            Patient Acceptance, E, VU,NR by  at 6/14/2022 1430      Show all documentation for this point (10)                                               User Key               Initials Effective Dates Name Provider Type Discipline       06/16/21 -  Mandi Smith OT Occupational Therapist OT                              OT Recommendation and Plan     Planned Therapy Interventions (OT): activity tolerance training, adaptive equipment training, BADL retraining, ROM/therapeutic exercise, strengthening exercise, transfer/mobility retraining                          Time Calculation:                Time Calculation- OT              Row Name 06/14/22 1435 06/14/22 0810                    Time Calculation- OT      OT Start Time 1400  -BF 0810  -BF        OT Stop Time 1425  -BF 0915  -BF        OT Time Calculation (min) 25 min  -BF 65 min  -BF        Total Timed Code Minutes- OT 25 minute(s)  -BF 65 minute(s)  -BF        OT Non-Billable Time (min) -- 10 min  -BF

## 2022-06-15 NOTE — PLAN OF CARE
Goal Outcome Evaluation:  Plan of Care Reviewed With: patient        Progress: improving  Outcome Evaluation: patient up with therapies this shift. insulins held and adjusted due to low blood sugar. dr bettencourt to see patient. continue plan of care.

## 2022-06-15 NOTE — SIGNIFICANT NOTE
06/15/22 1105   Plan   Plan Spoke to sister Ros [phone] about plans for pt to be discharged on 6-16-22, need for RW, 22 inch wheelchair, bedside commode, hospital bed, home health PT, OT, nursing, wound care, and I.V. Vancomycin 1500 mg every 12 hours until 6-23-22.  Informed her pt prefers [facility] and [facility].  Sister to be contacted about delivery of DME.  Sister and her spouse will [facility]  Sister has no preference for infusion pharmacy.  Contacted [facility] [phone] per preference per Lydia with discharge on 6-16-22, need for rolling walker, bedside commode, 22 inch heavy duty wheelchair with anti-tippers, elevating leg rests, and basic cushion, and hospital bed.  DME to be delivered to 's home.  Faxed face sheet, H&P, PT/OT notes and MD progress notes to [facility] via [employer].  DME orders to be faxed at discharge.

## 2022-06-15 NOTE — PROGRESS NOTES
[facility]  PROGRESS NOTE     Patient Identification:  Name:  Melchor Hardwick  Age:  57 y.o.  Sex:  male  :  1965  MRN:  [id number]  Visit Number:  [id number]  ROOM: 109/     Primary Care Provider:  Missy Up APRN    Length of stay in inpatient status:  13    Subjective     Chief Compliant:  No chief complaint on file.      History of Presenting Illness: 87-year-old gentleman with critical illness myopathy,.  Patient is status post left foot left midfoot amputation secondary to osteomyelitis and staph aureus bacteremia.  Patient been diagnosed cirrhosis secondary to [place], diabetes mellitus, anemia, thrombocytopenia, hypertension, hyperlipidemia.  Patient did have Ortiz catheter removed and did have some burning with urination urinalysis consistent with UTI today.  Patient's blood sugars also been borderline low and we will likely make adjustments in insulin scheduled today otherwise patient has no new complaints    Objective     Current Hospital Meds:aspirin, 81 mg, Oral, Daily  atorvastatin, 40 mg, Oral, Nightly  cefdinir, 300 mg, Oral, Q12H  furosemide, 40 mg, Oral, BID  Insulin Aspart, 0-7 Units, Subcutaneous, TID AC  Insulin Aspart, 10 Units, Subcutaneous, TID AC  insulin detemir, 10 Units, Subcutaneous, Q12H  iron polysaccharides, 150 mg, Oral, Daily  lubrisoft, , Apply externally, BID  metFORMIN, 500 mg, Oral, BID With Meals  multivitamin, 1 tablet, Oral, Daily  nadolol, 20 mg, Oral, Q24H  nystatin, , Topical, Q12H  pantoprazole, 40 mg, Oral, Q AM  senna-docusate sodium, 2 tablet, Oral, BID  sodium chloride, 10 mL, Intravenous, Q12H  sodium chloride, 10 mL, Intravenous, Q12H  spironolactone, 50 mg, Oral, Daily  vancomycin, 1,500 mg, Intravenous, Q12H       ----------------------------------------------------------------------------------------------------------------------  Vital Signs:  Temp:  [97.7 °F (36.5 °C)-97.9 °F (36.6 °C)] 97.9 °F (36.6 °C)  Heart Rate:  [68-79] 79  Resp:   [18] 18  BP: (118-119)/(58-64) 118/58  SpO2:  [94 %-95 %] 95 %  on   ;   Device (Oxygen Therapy): room air  Body mass index is 36.36 kg/m².    Wt Readings from Last 3 Encounters:   06/12/22 115 kg (253 lb 6.4 oz)   06/02/22 117 kg (258 lb)     Intake & Output (last 3 days)       06/12 0701 06/13 0700 06/13 0701 06/14 0700 06/14 0701  06/15 0700 06/15 0701 06/16 0700    P.O. 1200 1200 1440 240    I.V. (mL/kg)    500 (4.3)    IV Piggyback 500 500 500     Total Intake(mL/kg) 1700 (14.8) 1700 (14.8) 1940 (16.9) 740 (6.4)    Urine (mL/kg/hr) 2700 (1) 3825 (1.4) 4450 (1.6) 300 (0.5)    Stool 0 0 0 0    Total Output 2700 3825 4450 300    Net -1000 -2125 -2510 +440            Urine Unmeasured Occurrence    2 x    Stool Unmeasured Occurrence 1 x 1 x 1 x 2 x        Diet Regular; Cardiac, Consistent Carbohydrate, Daily Fluid Restriction; 1500 mL Fluid Per Day; High Protein  ----------------------------------------------------------------------------------------------------------------------  Physical exam:  Constitutional:   No acute distress  HEENT: Normocephalic atraumatic  Neck: Supple   Cardiovascular: Regular rate and rhythm  Pulmonary/Chest: Clear to auscultation  Abdominal: Positive bowel sounds soft.   Musculoskeletal: Foot is dressed, status post left midfoot amputation  Neurological: No focal deficits  Skin: No rash  Peripheral vascular:  Genitourinary:  ----------------------------------------------------------------------------------------------------------------------    Last echocardiogram:  Results for orders placed during the hospital encounter of 05/10/22    Adult Transthoracic Echo Complete W/ Cont if Necessary Per Protocol    Interpretation Summary  · Normal left ventricular cavity size and wall thickness noted. All left ventricular wall segments contract normally.  · Left ventricular ejection fraction appears to be 61 - 65%.  · The mitral valve is structurally normal with no significant stenosis present.  Trace mitral valve regurgitation is present.  · The aortic valve is structurally normal with no regurgitation or stenosis present.  · There is no evidence of pericardial effusion.    ----------------------------------------------------------------------------------------------------------------------  Results from last 7 days   Lab Units 06/14/22  0144 06/10/22  0035 06/09/22  1604 06/09/22 0142   CRP mg/dL  --  3.08*  --  3.19*   WBC 10*3/mm3 3.40 3.60  --  3.93   HEMOGLOBIN g/dL 8.7* 8.9*  --  8.8*   HEMATOCRIT % 28.1* 29.5*  --  28.1*   MCV fL 81.2 81.9  --  79.4   MCHC g/dL 31.0* 30.2*  --  31.3*   PLATELETS 10*3/mm3 76* 81*  --  88*   INR   --   --  1.06  --          Results from last 7 days   Lab Units 06/14/22  0144 06/10/22  0035 06/09/22  0142   SODIUM mmol/L 135* 134* 133*   POTASSIUM mmol/L 3.5 4.0 3.5   CHLORIDE mmol/L 102 100 99   CO2 mmol/L 23.9 24.8 25.0   BUN mg/dL 22* 18 17   CREATININE mg/dL 0.93 0.71* 0.70*   CALCIUM mg/dL 7.6* 7.8* 7.8*   GLUCOSE mg/dL 138* 174* 159*   ALBUMIN g/dL 1.96* 1.95* 1.87*   BILIRUBIN mg/dL 0.3 0.4 0.4   ALK PHOS U/L 264* 295* 275*   AST (SGOT) U/L 40 84* 74*   ALT (SGPT) U/L 38 53* 48*   Estimated Creatinine Clearance: 111.3 mL/min (by C-G formula based on SCr of 0.93 mg/dL).  No results found for: AMMONIA              Glucose   Date/Time Value Ref Range Status   06/15/2022 1111 74 70 - 130 mg/dL Final     Comment:     Meter: [id number] : [id number] Kirst[place]acon Berriosyla   06/15/2022 0600 82 70 - 130 mg/dL Final     Comment:     Meter: [id number] : [id number] [place] Crystal   06/14/2022 1956 169 (H) 70 - 130 mg/dL Final     Comment:     Meter: [id number] : [id number] Tracey Crystal   06/14/2022 1637 110 70 - 130 mg/dL Final     Comment:     Meter: [id number] : [id number] Penelope Hummela   06/14/2022 1131 214 (H) 70 - 130 mg/dL Final     Comment:     Meter: [id number] : [id number] Negrete Eunice   06/14/2022 0930 161 (H) 70 - 130 mg/dL Final     Comment:      Meter: [id number] : [id number] Steve Yancey   06/14/2022 0554 95 70 - 130 mg/dL Final     Comment:     Meter: [id number] : [id number] [place] Crystal   06/13/2022 1940 129 70 - 130 mg/dL Final     Comment:     Meter: [id number] : [id number] [place] Crystal     Lab Results   Component Value Date    TSH 4.700 (H) 05/30/2022    FREET4 0.87 (L) 05/30/2022     No results found for: PREGTESTUR, PREGSERUM, HCG, HCGQUANT  Pain Management Panel     Pain Management Panel Latest Ref Rng & Units 5/24/2022 5/11/2022    CREATININE UR mg/dL 91.0 -    AMPHETAMINES SCREEN, URINE Negative - Negative    BARBITURATES SCREEN Negative - Negative    BENZODIAZEPINE SCREEN, URINE Negative - Negative    BUPRENORPHINEUR Negative - Negative    COCAINE SCREEN, URINE Negative - Negative    METHADONE SCREEN, URINE Negative - Negative    METHAMPHETAMINEUR Negative - Negative        Brief Urine Lab Results  (Last result in the past 365 days)      Color   Clarity   Blood   Leuk Est   Nitrite   Protein   CREAT   Urine HCG        06/14/22 1229 Yellow   Clear   Negative   Large (3+)   Positive   Negative               No results found for: BLOODCX  Results from last 7 days   Lab Units 06/14/22  1229   NITRITE UA  Positive*   WBC UA /HPF Too Numerous to Count*   BACTERIA UA /HPF 4+*   SQUAM EPITHEL UA /HPF None Seen   URINECX  >100,000 CFU/mL Gram Negative Bacilli*     Urine Culture   Date Value Ref Range Status   06/14/2022 >100,000 CFU/mL Gram Negative Bacilli (A)  Preliminary     No results found for: WOUNDCX  No results found for: STOOLCX  Results from last 7 days   Lab Units 06/10/22  0035 06/09/22  0142   CRP mg/dL 3.08* 3.19*       I have personally looked at the labs and they are summarized above.  ----------------------------------------------------------------------------------------------------------------------  Detailed radiology reports for the last 24 hours:    Imaging Results (Last 24 Hours)     ** No results found for the last  24 hours. **        Final impressions for the last 30 days of radiology reports:    CT Abdomen Pelvis Without Contrast    Result Date: 5/25/2022  1.  Advanced liver cirrhosis with changes of portal hypertension which include spinal megaly, prominent portosystemic collateral vessels, small-moderate volume ascites, and marked anasarca. 2.  Moderate constipation and mild generalized ileus. No bowel obstruction. 3.  Miniscule pleural effusions. 4.  Other incidental and nonacute findings detailed above.  This report was finalized on 5/25/2022 8:48 AM by Dr. Brain Rodriguez MD.      US Abdomen Complete    Result Date: 5/20/2022  1. Splenomegaly. 2. Small volume ascites.  This report was finalized on 5/20/2022 12:59 PM by Dr. Loob Rign MD.      US Scrotum & Testicles    Result Date: 5/20/2022  Extensive scrotal wall swelling.   This report was finalized on 5/20/2022 12:59 PM by Dr. Lobo Ring MD.      US Scrotum & Testicles    Result Date: 5/16/2022  Scrotal wall thickening  This report was finalized on 5/16/2022 4:57 PM by Dr. Lobo Ring MD.      MRI Foot Left Without Contrast    Result Date: 5/16/2022  Appearance concerning for osteomyelitis involving the 3rd metatarsal and likely the adjacent cuneiform.  This report was finalized on 5/16/2022 3:10 PM by Dr. Lobo Ring MD.      US Venous Doppler Lower Extremity Bilateral (duplex)    Result Date: 5/24/2022  No sonographic findings of DVT in the lower extremities.  This report was finalized on 5/24/2022 8:05 AM by Dr. Garry Lovelace II, MD.      XR Foreign Body For MRI    Result Date: 5/16/2022  No radiopaque foreign body identified  This report was finalized on 5/16/2022 10:20 AM by Dr. Lobo Ring MD.      I have personally looked at the radiology images and read the final radiology report.    Assessment & Plan    Critical illness myopathy--standby assistance for up with bed mobility; Optigard for transfers; yesterday ambulated 20 feet, 20 feet, 35 feet in  the morning with front wheel walker; 20 feet times 2 in the afternoon with front wheel walker.  Patient requiring set up for bathing; set up for upper body dressing; standby assistance for lower body dressing; moderately independent for grooming; minimum assist for toileting.    UTI placed on Omnicef 300 twice daily x10 days    Recent osteomyelitis necessitating midfoot amputation staff aureus bacteremia continue vancomycin through June 23    Thrombocytopenia stable.  Likely secondary to multifactorial reasons including recent infection and cirrhosis    Diabetes mellitus--decrease NovoLog prior to meals to 10 units 3 times daily and decrease Levemir to 10 units nightly    Anemia stable    Hypertension continue current treatment    VTE Prophylaxis:   Mechanical Order History:      Ordered        06/09/22 0906  Place Sequential Compression Device  Once            06/09/22 0906  Maintain Sequential Compression Device  Continuous            06/02/22 1707  Maintain Sequential Compression Device  Continuous                    Pharmalogical Order History:      Ordered     Dose Route Frequency Stop    06/02/22 1707  Enoxaparin Sodium (LOVENOX) syringe 40 mg  Status:  Discontinued         40 mg SC Nightly 06/09/22 0905                    Joe Mike MD  [facility] Hospitalist  06/15/22  12:26 EDT

## 2022-06-15 NOTE — DISCHARGE INSTR - OTHER ORDERS
[facility] [phone] for PT/OT/nursing care.    [facility] [phone] for 22 inch heavy duty wheelchair, elevating leg rest, anti-tippers, cushion, bedside commode, and hospital bed.    [facility] [phone] for I.V. Vancomycin 1500 mg every 12 hours until 6-23-22.

## 2022-06-16 VITALS
OXYGEN SATURATION: 99 % | BODY MASS INDEX: 36.84 KG/M2 | WEIGHT: 257.3 LBS | TEMPERATURE: 97.9 F | HEART RATE: 70 BPM | RESPIRATION RATE: 20 BRPM | SYSTOLIC BLOOD PRESSURE: 112 MMHG | HEIGHT: 70 IN | DIASTOLIC BLOOD PRESSURE: 70 MMHG

## 2022-06-16 LAB
BACTERIA SPEC AEROBE CULT: ABNORMAL
GLUCOSE BLDC GLUCOMTR-MCNC: 138 MG/DL (ref 70–130)
GLUCOSE BLDC GLUCOMTR-MCNC: 197 MG/DL (ref 70–130)

## 2022-06-16 PROCEDURE — 99238 HOSP IP/OBS DSCHRG MGMT 30/<: CPT | Performed by: FAMILY MEDICINE

## 2022-06-16 PROCEDURE — 82962 GLUCOSE BLOOD TEST: CPT

## 2022-06-16 PROCEDURE — 63710000001 INSULIN DETEMIR PER 5 UNITS: Performed by: FAMILY MEDICINE

## 2022-06-16 PROCEDURE — 25010000002 VANCOMYCIN 5 G RECONSTITUTED SOLUTION: Performed by: INTERNAL MEDICINE

## 2022-06-16 PROCEDURE — 97530 THERAPEUTIC ACTIVITIES: CPT

## 2022-06-16 PROCEDURE — 63710000001 INSULIN ASPART PER 5 UNITS: Performed by: FAMILY MEDICINE

## 2022-06-16 PROCEDURE — 97535 SELF CARE MNGMENT TRAINING: CPT | Performed by: OCCUPATIONAL THERAPIST

## 2022-06-16 RX ORDER — CEFDINIR 300 MG/1
300 CAPSULE ORAL EVERY 12 HOURS SCHEDULED
Qty: 11 CAPSULE | Refills: 0 | Status: SHIPPED | OUTPATIENT
Start: 2022-06-16 | End: 2022-06-22

## 2022-06-16 RX ORDER — NADOLOL 20 MG/1
20 TABLET ORAL
Qty: 30 TABLET | Refills: 0 | Status: SHIPPED | OUTPATIENT
Start: 2022-06-17 | End: 2022-10-05 | Stop reason: HOSPADM

## 2022-06-16 RX ORDER — NYSTATIN 100000 [USP'U]/G
POWDER TOPICAL EVERY 12 HOURS SCHEDULED
Qty: 60 G | Refills: 0 | Status: SHIPPED | OUTPATIENT
Start: 2022-06-16 | End: 2022-10-05 | Stop reason: HOSPADM

## 2022-06-16 RX ORDER — INSULIN ASPART 100 [IU]/ML
10 INJECTION, SOLUTION INTRAVENOUS; SUBCUTANEOUS
Qty: 10 ML | Refills: 12 | Status: ON HOLD | OUTPATIENT
Start: 2022-06-16 | End: 2022-10-05

## 2022-06-16 RX ORDER — IRON POLYSACCHARIDE COMPLEX 150 MG
150 CAPSULE ORAL DAILY
Qty: 30 CAPSULE | Refills: 0 | Status: SHIPPED | OUTPATIENT
Start: 2022-06-17 | End: 2022-07-17

## 2022-06-16 RX ORDER — PANTOPRAZOLE SODIUM 40 MG/1
40 TABLET, DELAYED RELEASE ORAL
Qty: 30 TABLET | Refills: 0 | Status: SHIPPED | OUTPATIENT
Start: 2022-06-17 | End: 2022-07-17

## 2022-06-16 RX ORDER — POLYETHYLENE GLYCOL 3350 17 G/17G
17 POWDER, FOR SOLUTION ORAL DAILY PRN
Qty: 30 EACH | Refills: 0 | Status: SHIPPED | OUTPATIENT
Start: 2022-06-16 | End: 2022-07-16

## 2022-06-16 RX ORDER — DIPHENOXYLATE HYDROCHLORIDE AND ATROPINE SULFATE 2.5; .025 MG/1; MG/1
1 TABLET ORAL DAILY
Qty: 30 TABLET | Refills: 0 | Status: ON HOLD | OUTPATIENT
Start: 2022-06-16 | End: 2022-10-28

## 2022-06-16 RX ORDER — BISACODYL 5 MG/1
5 TABLET, DELAYED RELEASE ORAL DAILY PRN
Qty: 10 TABLET | Refills: 0 | Status: SHIPPED | OUTPATIENT
Start: 2022-06-16 | End: 2022-06-26

## 2022-06-16 RX ORDER — SPIRONOLACTONE 50 MG/1
50 TABLET, FILM COATED ORAL DAILY
Qty: 30 TABLET | Refills: 0 | Status: SHIPPED | OUTPATIENT
Start: 2022-06-17 | End: 2022-10-05 | Stop reason: HOSPADM

## 2022-06-16 RX ORDER — PEN NEEDLE, DIABETIC 30 GX3/16"
1 NEEDLE, DISPOSABLE MISCELLANEOUS 2 TIMES DAILY
Qty: 100 EACH | Refills: 0 | Status: ON HOLD | OUTPATIENT
Start: 2022-06-16 | End: 2022-10-28

## 2022-06-16 RX ORDER — FUROSEMIDE 40 MG/1
40 TABLET ORAL 2 TIMES DAILY
Qty: 60 TABLET | Refills: 0 | Status: SHIPPED | OUTPATIENT
Start: 2022-06-16 | End: 2022-10-05 | Stop reason: HOSPADM

## 2022-06-16 RX ORDER — ASPIRIN 81 MG/1
81 TABLET ORAL DAILY
Qty: 30 TABLET | Refills: 0 | Status: SHIPPED | OUTPATIENT
Start: 2022-06-17 | End: 2022-07-17

## 2022-06-16 RX ORDER — ATORVASTATIN CALCIUM 40 MG/1
40 TABLET, FILM COATED ORAL NIGHTLY
Qty: 90 TABLET | Refills: 0 | Status: SHIPPED | OUTPATIENT
Start: 2022-06-16 | End: 2022-07-16

## 2022-06-16 RX ADMIN — INSULIN ASPART 2 UNITS: 100 INJECTION, SOLUTION INTRAVENOUS; SUBCUTANEOUS at 10:49

## 2022-06-16 RX ADMIN — INSULIN DETEMIR 10 UNITS: 100 INJECTION, SOLUTION SUBCUTANEOUS at 09:07

## 2022-06-16 RX ADMIN — PANTOPRAZOLE SODIUM 40 MG: 40 TABLET, DELAYED RELEASE ORAL at 05:41

## 2022-06-16 RX ADMIN — VANCOMYCIN HYDROCHLORIDE 1500 MG: 5 INJECTION, POWDER, LYOPHILIZED, FOR SOLUTION INTRAVENOUS at 09:10

## 2022-06-16 RX ADMIN — ASPIRIN 81 MG: 81 TABLET, COATED ORAL at 09:08

## 2022-06-16 RX ADMIN — Medication 150 MG: at 09:10

## 2022-06-16 RX ADMIN — DOCUSATE SODIUM 50 MG AND SENNOSIDES 8.6 MG 2 TABLET: 8.6; 5 TABLET, FILM COATED ORAL at 09:08

## 2022-06-16 RX ADMIN — Medication: at 09:11

## 2022-06-16 RX ADMIN — NADOLOL 20 MG: 40 TABLET ORAL at 09:08

## 2022-06-16 RX ADMIN — Medication 1 TABLET: at 09:08

## 2022-06-16 RX ADMIN — CEFDINIR 300 MG: 300 CAPSULE ORAL at 09:08

## 2022-06-16 RX ADMIN — Medication 10 ML: at 09:11

## 2022-06-16 RX ADMIN — NYSTATIN: 100000 POWDER TOPICAL at 09:11

## 2022-06-16 RX ADMIN — SPIRONOLACTONE 50 MG: 25 TABLET ORAL at 09:08

## 2022-06-16 RX ADMIN — FUROSEMIDE 40 MG: 40 TABLET ORAL at 09:08

## 2022-06-16 RX ADMIN — METFORMIN HYDROCHLORIDE 500 MG: 500 TABLET ORAL at 09:08

## 2022-06-16 RX ADMIN — INSULIN ASPART 10 UNITS: 100 INJECTION, SOLUTION INTRAVENOUS; SUBCUTANEOUS at 09:07

## 2022-06-16 RX ADMIN — INSULIN ASPART 10 UNITS: 100 INJECTION, SOLUTION INTRAVENOUS; SUBCUTANEOUS at 11:22

## 2022-06-16 NOTE — SIGNIFICANT NOTE
06/16/22 1627   Plan   Plan Faxed discharge summary to [facility] via [employer].  Faxed discharge summary and Pharmacist note to [facility] [phone].  Faxed discharge summary to [facility] [phone].

## 2022-06-16 NOTE — PROGRESS NOTES
Patient Assessment Instrument  Quality Indicators - Discharge    Section GG. Self-Care Performance     Eating: Patient completed the activities by him/herself with no assistance from  a helper.   Oral Hygiene: Patient completed the activities by him/herself with no  assistance from a helper.   Toileting Hygiene: : [place] provides verbal cues and/or touching/steadying  and/or contact guard assistance as patient completes activity.   Shower/Bathe Self: [place] sets up or cleans up; patient completes activity.  [place] assists only prior to or following the activity.   Upper Body Dressing: [place] sets up or cleans up; patient completes activity.  [place] assists only prior to or following the activity.   Lower Body Dressing: [place] sets up or cleans up; patient completes activity.  [place] assists only prior to or following the activity.   Putting On/Taking Off Footwear: [place] sets up or cleans up; patient completes  activity. [place] assists only prior to or following the activity.    Section GG. Mobility Performance      Section J. Health Conditions Discharge      Section M. Skin Conditions Discharge      . Current Number of Unhealed Pressure Ulcers      Section N. Medication    Signed by: Mandi Smith OT

## 2022-06-16 NOTE — THERAPY DISCHARGE NOTE
Inpatient Rehabilitation - IRF Occupational Therapy Progress Note/Discharge   [place]     Patient Name: Melchor Hardwick  : 1965  MRN: [id number]  Today's Date: 2022               Admit Date: 2022       ICD-10-CM ICD-9-CM   1. Critical illness myopathy  G72.81 359.81   2. Acute hematogenous osteomyelitis, unspecified site (HCC)  M86.00 730.00   3. Other acute osteomyelitis of left foot (HCC)  M86.172 730.07     Patient Active Problem List   Diagnosis   • Hyperlipidemia   • Hypertensive disorder   • Kidney disease   • Obesity   • Type 2 diabetes mellitus (HCC)   • Gas gangrene of foot (HCC)   • Cellulitis in diabetic foot (HCC)   • Other acute osteomyelitis of left foot (HCC)   • Osteomyelitis (HCC)   • Critical illness myopathy     Past Medical History:   Diagnosis Date   • Diabetes mellitus (HCC)    • Elevated cholesterol    • Hyperlipidemia    • Hypertension      Past Surgical History:   Procedure Laterality Date   • ABSCESS DRAINAGE      PERINEUM   • AMPUTATION FOOT Left 2022    Procedure: AMPUTATION FOOT;  Surgeon: Addison Colby MD;  Location: [facility];  Service: Podiatry;  Laterality: Left;   • INCISION AND DRAINAGE LEG Left 05/10/2022    Procedure: INCISION AND DRAINAGE LOWER EXTREMITY;  Surgeon: Addison Colby MD;  Location: [facility];  Service: Podiatry;  Laterality: Left;   • WOUND DEBRIDEMENT Left 2022    Procedure: DEBRIDEMENT FOOT;  Surgeon: Addison Colby MD;  Location: [facility];  Service: Podiatry;  Laterality: Left;       IRF OT ASSESSMENT FLOWSHEET (last 12 hours)     IRF OT Evaluation and Treatment     Row Name 22 1225          OT Time and Intention    Document Type discharge evaluation  -BF     Mode of Treatment occupational therapy  -BF     Patient Effort good  -BF     Symptoms Noted During/After Treatment none  -BF     Row Name 22 1225          General Information    Patient/Family/Caregiver Comments/Observations Pt/sister educated in  self-care/precautions for home. No questions/concerns expressed at this time.  -BF     Existing Precautions/Restrictions fall;non-weight bearing;left  <skin integrity L foot  -BF     Row Name 06/16/22 1225          Cognition/Psychosocial    Orientation Status (Cognition) oriented x 4  -BF     Follows Commands (Cognition) WFL  -BF     Row Name 06/16/22 1225          Range of Motion Comprehensive    General Range of Motion bilateral upper extremity ROM WFL  -BF     Row Name 06/16/22 1225          Strength Comprehensive (MMT)    Comment, General Manual Muscle Testing (MMT) Assessment BUE grossly 4/5  -BF     Row Name 06/16/22 1225          Bathing    Comment (Bathing) Set-up  -BF     Row Name 06/16/22 1225          Upper Body Dressing    Comment (Upper Body Dressing) Set-up  -BF     Row Name 06/16/22 1225          Lower Body Dressing    Comment (Lower Body Dressing) Set-up  -BF     Row Name 06/16/22 1225          Grooming    Comment (Grooming) Mod I  -BF     Row Name 06/16/22 1225          Toileting    Comment (Toileting) Supervision  -BF     Row Name 06/16/22 1225          Self-Feeding    Comment (Self-Feeding) Independent  -BF     Row Name 06/16/22 1225          Positioning and Restraints    In Bed sitting EOB;call light within reach;encouraged to call for assist;with family/caregiver  -BF           User Key  (r) = Recorded By, (t) = Taken By, (c) = Cosigned By    Initials Name Effective Dates    BF Mandi Smith, OT 06/16/21 -               Wound 05/18/22 1545 Left other (see comments) foot Incision (Active)   Dressing Appearance dry;intact 06/16/22 0725   Closure Approximated;Staples;Sutures 06/16/22 0604   Base clean;dry 06/16/22 0604   Periwound dry;intact 06/15/22 1800   Periwound Temperature warm 06/15/22 1800   Drainage Amount none 06/15/22 1840   Care, Wound cleansed with 06/16/22 0604   Dressing Care dressing changed 06/16/22 0604       Occupational Therapy Education                 Title: PT OT SLP  Therapies (Done)     Topic: Occupational Therapy (Resolved)     Point: ADL training (Resolved)     Description:   Instruct learner(s) on proper safety adaptation and remediation techniques during self care or transfers.   Instruct in proper use of assistive devices.              Learning Progress Summary           Patient Acceptance, E, VU by BF at 6/16/2022 1225   Family Acceptance, E, VU by BF at 6/16/2022 1225      Show all documentation for this point (12)                 Point: Precautions (Resolved)     Description:   Instruct learner(s) on prescribed precautions during self-care and functional transfers.              Learning Progress Summary           Patient Acceptance, E, VU by BF at 6/16/2022 1225   Family Acceptance, E, VU by BF at 6/16/2022 1225      Show all documentation for this point (12)                             User Key     Initials Effective Dates Name Provider Type Discipline     06/16/21 -  Mandi Smith, OT Occupational Therapist OT                OT Recommendation and Plan  Planned Therapy Interventions (OT): activity tolerance training, adaptive equipment training, occupation/activity based interventions, patient/caregiver education/training, ROM/therapeutic exercise, strengthening exercise, transfer/mobility retraining           OT IRF GOALS     Row Name 06/16/22 1200 06/10/22 1421 06/10/22 1400       Bathing Goal 1 (OT-IRF)    Activity/Device (Bathing Goal 1, OT-IRF) bathing skills, all  -BF -- bathing skills, all  -BF    [place] Level (Bathing Goal 1, OT-IRF) moderate assist (50-74% patient effort)  -BF -- moderate assist (50-74% patient effort)  -BF    Time Frame (Bathing Goal 1, OT-IRF) short-term goal (STG);1 week  -BF -- short-term goal (STG);1 week  -BF    Progress/Outcomes (Bathing Goal 1, OT-IRF) goal met  -BF -- goal met  -BF       Bathing Goal 2 (OT-IRF)    Activity/Device (Bathing Goal 2, OT-IRF) bathing skills, all  -BF -- bathing skills, all  -BF     [place] Level (Bathing Goal 2, OT-IRF) minimum assist (75% or more patient effort)  -BF -- minimum assist (75% or more patient effort)  -BF    Time Frame (Bathing Goal 2, OT-IRF) long-term goal (LTG);by discharge  -BF -- long-term goal (LTG);by discharge  -BF    Progress/Outcomes (Bathing Goal 2, OT-IRF) goal met  -BF -- goal met  -BF       LB Dressing Goal 1 (OT-IRF)    Activity/Device (LB Dressing Goal 1, OT-IRF) lower body dressing  -BF lower body dressing  -BF lower body dressing  -BF    [place] (LB Dressing Goal 1, OT-IRF) set-up required  -BF set-up required  -BF moderate assist (50-74% patient effort)  -BF    Time Frame (LB Dressing Goal 1, OT-IRF) by discharge  -BF by discharge  -BF short-term goal (STG);1 week  -BF    Progress/Outcomes (LB Dressing Goal 1, OT-IRF) goal met  -BF -- goal met  -BF       LB Dressing Goal 2 (OT-IRF)    Activity/Device (LB Dressing Goal 2, OT-IRF) lower body dressing  -BF -- lower body dressing  -BF    [place] (LB Dressing Goal 2, OT-IRF) minimum assist (75% or more patient effort)  -BF -- minimum assist (75% or more patient effort)  -BF    Time Frame (LB Dressing Goal 2, OT-IRF) long-term goal (LTG);by discharge  -BF -- long-term goal (LTG);by discharge  -BF    Progress/Outcomes (LB Dressing Goal 2, OT-IRF) goal met  -BF -- goal met  -BF    Row Name 06/03/22 1347             Bathing Goal 1 (OT-IRF)    Activity/Device (Bathing Goal 1, OT-IRF) bathing skills, all  -BF      [place] Level (Bathing Goal 1, OT-IRF) moderate assist (50-74% patient effort)  -BF      Time Frame (Bathing Goal 1, OT-IRF) short-term goal (STG);1 week  -BF              Bathing Goal 2 (OT-IRF)    Activity/Device (Bathing Goal 2, OT-IRF) bathing skills, all  -BF      [place] Level (Bathing Goal 2, OT-IRF) minimum assist (75% or more patient effort)  -BF      Time Frame (Bathing Goal 2, OT-IRF) long-term goal (LTG);by discharge  -BF              LB Dressing Goal 1 (OT-IRF)     Activity/Device (LB Dressing Goal 1, OT-IRF) lower body dressing  -BF      [place] (LB Dressing Goal 1, OT-IRF) moderate assist (50-74% patient effort)  -BF      Time Frame (LB Dressing Goal 1, OT-IRF) short-term goal (STG);1 week  -BF              LB Dressing Goal 2 (OT-IRF)    Activity/Device (LB Dressing Goal 2, OT-IRF) lower body dressing  -BF      [place] (LB Dressing Goal 2, OT-IRF) minimum assist (75% or more patient effort)  -BF      Time Frame (LB Dressing Goal 2, OT-IRF) long-term goal (LTG);by discharge  -BF            User Key  (r) = Recorded By, (t) = Taken By, (c) = Cosigned By    Initials Name Provider Type    Mandi Saleem OT Occupational Therapist                    Time Calculation:    Time Calculation- OT     Row Name 06/16/22 1230             Time Calculation- OT    Total Timed Code Minutes- OT 10 minute(s)  -BF      OT Non-Billable Time (min) 30 min  -BF            User Key  (r) = Recorded By, (t) = Taken By, (c) = Cosigned By    Initials Name Provider Type    Mandi Saleem OT Occupational Therapist                Therapy Charges for Today     Code Description Service Date Service Provider Modifiers Qty    43857445793 HC OT SELF CARE/MGMT/TRAIN EA 15 MIN 6/16/2022 Mandi Smith OT GO 1               OT Discharge Summary  Reason for Discharge: Discharge from facility  Outcomes Achieved: Able to achieve all goals within established timeline  Discharge Destination: Home with assist, Home with home health    Mandi Smith OT  6/16/2022

## 2022-06-16 NOTE — SIGNIFICANT NOTE
06/16/22 1200   Plan   Plan Spoke to [place] with [facility] [phone] who states they will bill pt and deliver I.V. medications to pt's home around 8:00 pm.  Reviewed this information with pt, sister and brother-in-law.  Pt is going home with sister and brother-in-law who will provide assistance.  Family will transport pt home.   Patient/Family in Agreement with Plan yes

## 2022-06-16 NOTE — PROGRESS NOTES
Patient continues on day 20 vancomycin. Last trough level was 18.9 mg/L with  on 6/12. Would recommend another trough level on Saturday 6/17 if possible. Goal range is 15-20 mg/L or AUC of 400-600.     Thank you,    Kizzy Dahl, PharmD

## 2022-06-16 NOTE — SIGNIFICANT NOTE
06/16/22 1122   PT Discharge Summary   Reason for Discharge Discharge from facility   Outcomes Achieved Patient able to partially acheive established goals   Discharge Destination Home with home health

## 2022-06-16 NOTE — SIGNIFICANT NOTE
06/16/22 1035   Plan   Plan Faxed DME orders to [facility] via [employer].  Contacted [facility] [phone] per Kaykay who says [place] office will be seeing pt and referral information will be forwarded to their office.  Informed Kaykay pt is being discharged today and will need next dose of I.V. Vancomycin 1500 mg around 8pm per RN.  Faxed ambulatory referral for home health to  via [employer].  Discharge summary to be faxed when available.  SS will follow-up with [facility] office after they have received referral information.  Contacted [facility] [phone] per Allen about pt being discharged today and insurance coverage.  Allen says pt's insurance says preliminary eligibility, therefore, pt may have to pay the full cost of medication and supplies which is $672.99 until they verify insurance coverage.  Informed Allen pt has coverage with [facility] [employer] of [place] until 6-30-22 and sister Ros applied for long-term Medicaid coverage.  Spoke to pt and sister Ros via phone with this information.  Sister says pt has preliminary eligibility with Medicaid until 12-31-22 and she called Medicaid on 6-15-22 to check on status; she had to send them a letter from pt's employer with last date worked and last paycheck which they received on 6-7-22.  Representative informed  it may take up to 2 weeks before they have a decision.  SS reviewed this information with Allen with [employer].  Allen will ask Manager if pt could be billed instead of paying full cost of medication today (pt is willing to pay full cost if needed).  Allen says they will plan [facility]  Allen will call SS back.

## 2022-06-16 NOTE — PROGRESS NOTES
Occupational Therapy:    Physical Therapy: Individual: 15 minutes.    Speech Language Pathology:    Signed by: Michi Mckeon PTA

## 2022-06-16 NOTE — SIGNIFICANT NOTE
06/16/22 3926   Plan   Plan Spoke to Kizzy, Pharmacist, who says pt will need Vanco trough level on 6-17-22 with goal range 15-20 mg/L or AUC of 400-600.  Contacted [facility]  [phone] per [address] [facility] with this information and reviewed wound care to pt's left midfoot amputation site:  clean with betadine, 4x4's, kerlix, ace wrap daily and apply ABD x 2 to back of heel/leg and I.V. Vancomycin 1500 mg every 12 hours until 6-23-22, next dose to be given around 8:00 pm tonight per RN.  Informed [facility] to deliver meds to pt's home around 8pm.  [phone].   nurse to go to pt's home around 8pm for I.V. meds.

## 2022-06-16 NOTE — PROGRESS NOTES
Occupational Therapy: Individual: 10 minutes.    Physical Therapy:    Speech Language Pathology:    Signed by: Mandi Smith OT

## 2022-06-16 NOTE — THERAPY DISCHARGE NOTE
Inpatient Rehabilitation - Physical Therapy Treatment Note/Discharge  YVONNE Gaines     Patient Name: Melchor Hardwick  : 1965  MRN: [id number]  Today's Date: 2022                Admit Date: 2022    Visit Dx:    ICD-10-CM ICD-9-CM   1. Critical illness myopathy  G72.81 359.81   2. Acute hematogenous osteomyelitis, unspecified site (HCC)  M86.00 730.00   3. Other acute osteomyelitis of left foot (HCC)  M86.172 730.07     Patient Active Problem List   Diagnosis   • Hyperlipidemia   • Hypertensive disorder   • Kidney disease   • Obesity   • Type 2 diabetes mellitus (HCC)   • Gas gangrene of foot (HCC)   • Cellulitis in diabetic foot (HCC)   • Other acute osteomyelitis of left foot (HCC)   • Osteomyelitis (HCC)   • Critical illness myopathy     Past Medical History:   Diagnosis Date   • Diabetes mellitus (HCC)    • Elevated cholesterol    • Hyperlipidemia    • Hypertension      Past Surgical History:   Procedure Laterality Date   • ABSCESS DRAINAGE      PERINEUM   • AMPUTATION FOOT Left 2022    Procedure: AMPUTATION FOOT;  Surgeon: Addison Colby MD;  Location: [facility];  Service: Podiatry;  Laterality: Left;   • INCISION AND DRAINAGE LEG Left 05/10/2022    Procedure: INCISION AND DRAINAGE LOWER EXTREMITY;  Surgeon: Addison Colby MD;  Location: [facility];  Service: Podiatry;  Laterality: Left;   • WOUND DEBRIDEMENT Left 2022    Procedure: DEBRIDEMENT FOOT;  Surgeon: Addison Colby MD;  Location: [facility];  Service: Podiatry;  Laterality: Left;       PT ASSESSMENT (last 12 hours)     IRF PT Evaluation and Treatment     Row Name 22 1120          PT Time and Intention    Document Type discharge evaluation  am session  -RG     Mode of Treatment individual therapy;physical therapy  -RG     Patient/Family/Caregiver Comments/Observations Pt DC to Home with HH on this date with HEP, Home Safety, and GTB. No questions or concerns expressed.  -RG     Row Name 22 1120          General  Information    Patient Profile Reviewed yes  -RG     Existing Precautions/Restrictions fall;non-weight bearing  <skin integrity L foot  -RG     Row Name 06/16/22 1120          Pain Scale: FACES Pre/Post-Treatment    Pain: FACES Scale, Pretreatment 4-->hurts little more  -RG     Posttreatment Pain Rating 4-->hurts little more  -RG     Row Name 06/16/22 1120          Cognition/Psychosocial    Affect/Mental Status (Cognition) WFL  -RG     Follows Commands (Cognition) WFL  -RG     Row Name 06/16/22 1120          Mobility    Left Lower Extremity (Weight-bearing Status) non weight-bearing (NWB)  -RG     Right Lower Extremity (Weight-bearing Status) weight-bearing as tolerated (WBAT)  -RG     Row Name 06/16/22 1120          Transfers    Chair-Bed [place] (Transfers) contact guard;verbal cues;nonverbal cues (demo/gesture)  -RG     Sit-Stand [place] (Transfers) verbal cues;nonverbal cues (demo/gesture);contact guard  -RG     Stand-Sit [place] (Transfers) verbal cues;nonverbal cues (demo/gesture);contact guard  -RG     Row Name 06/16/22 1120          Chair-Bed Transfer    Assistive Device (Chair-Bed Transfers) wheelchair  -RG     Row Name 06/16/22 1120          Sit-Stand Transfer    Assistive Device (Sit-Stand Transfers) walker, front-wheeled;wheelchair  -RG     Row Name 06/16/22 1120          Stand-Sit Transfer    Assistive Device (Stand-Sit Transfers) walker, front-wheeled;wheelchair  -RG     Row Name 06/16/22 1120          Gait/Stairs (Locomotion)    [place] Level (Gait) contact guard;verbal cues;nonverbal cues (demo/gesture)  -     Assistive Device (Gait) walker, front-wheeled  -RG     Deviations/Abnormal Patterns (Gait) antalgic;yao decreased;gait speed decreased;stride length decreased  -     Bilateral Gait Deviations forward flexed posture  -     Row Name 06/16/22 1120          Bed Mobility Goal 1 (PT-IRF)    Progress/Outcomes (Bed Mobility Goal 1, PT-IRF) goal partially met  -[facility]  Name 06/16/22 1120          Transfer Goal 1 (PT-IRF)    Progress/Outcomes (Transfer Goal 1, PT-IRF) goal partially met  -RG     Row Name 06/16/22 1120          Gait/Walking Locomotion Goal 1 (PT-IRF)    Progress/Outcomes (Gait/Walking Locomotion Goal 1, PT-IRF) goal partially met  -RG           User Key  (r) = Recorded By, (t) = Taken By, (c) = Cosigned By    Initials Name Provider Type    RG Michi Mckeon PTA Physical Therapist Assistant                Physical Therapy Education                 Title: PT OT SLP Therapies (Done)     Topic: Physical Therapy (Done)     Point: Mobility training (Done)     Learning Progress Summary           Patient Acceptance, E,D, VU,NR by  at 6/16/2022 1121      Show all documentation for this point (14)                 Point: Home exercise program (Done)     Learning Progress Summary           Patient Acceptance, E,D, VU,NR by  at 6/16/2022 1121      Show all documentation for this point (14)                 Point: Body mechanics (Done)     Learning Progress Summary           Patient Acceptance, E,D, VU,NR by  at 6/16/2022 1121      Show all documentation for this point (14)                 Point: Precautions (Done)     Learning Progress Summary           Patient Acceptance, E,D, VU,NR by  at 6/16/2022 1121      Show all documentation for this point (14)                             User Key     Initials Effective Dates Name Provider Type Discipline     06/16/21 -  Michi Mckeon PTA Physical Therapist Assistant PT                PT Recommendation and Plan                  Time Calculation:    PT Charges     Row Name 06/16/22 1123             Time Calculation    PT Received On 06/16/22  -              Time Calculation- PT    Total Timed Code Minutes- PT 15 minute(s)  -            User Key  (r) = Recorded By, (t) = Taken By, (c) = Cosigned By    Initials Name Provider Type    RG Michi Mckeon PTA Physical Therapist Assistant                Therapy Charges for Today      Code Description Service Date Service Provider Modifiers Qty    73230820791 HC GAIT TRAINING EA 15 MIN 6/15/2022 Michi Mckeon PTA GP, CQ 1    67917272207 HC PT THERAPEUTIC ACT EA 15 MIN 6/15/2022 Michi Mckeon PTA GP, CQ 2    97400860667 HC PT THER PROC EA 15 MIN 6/15/2022 Michi Mckeon PTA GP, CQ 3    69001223618 HC PT THERAPEUTIC ACT EA 15 MIN 6/16/2022 Michi Mckeon PTA GP, CQ 1               PT Discharge Summary  Reason for Discharge: Discharge from facility  Outcomes Achieved: Patient able to partially acheive established goals  Discharge Destination: Home with home health    Michi Mckeon PTA  6/16/2022

## 2022-06-16 NOTE — PAYOR COMM NOTE
"Jaja Ramirez, RN  Utilization Review Nurse for Inpatient Rehab  Phone: [phone]  Fax: [phone]  Email: [email or web address]  [facility]  Facility NPI: [id number]    DISCHARGE NOTIFICATION  Member: Melchor Hardwick  ID# [id number]  Ref# [id number]  Admission Date: 06/02/22  Discharged home on 6/16/22 with sister and [facility]    Melchor Hardwick (57 y.o. Male)             Date of Birth   1965    Social Security Number       Address   [address] [address]    Home Phone   [phone]    MRN   [id number]       Hindu   [facility]    Marital Status   Single                            Admission Date   6/2/22    Admission Type   Elective    Admitting Provider   Joe Mike MD    Attending Provider       Department, Room/Bed   [facility], 109/2S       Discharge Date   6/16/2022    Discharge Disposition   Home-Health Care Sv    Discharge Destination                               Attending Provider: (none)   Allergies: No Known Allergies    Isolation: None   Infection: MRSA No Isolation this Admit (05/22/22)   Code Status: Prior   Advance Care Planning Activity    Ht: 177.8 cm (70\")   Wt: 117 kg (257 lb 4.8 oz)    Admission Cmt: None   Principal Problem: None              Corrina Mendez MSW       Case Management   Significant Note      Signed   Date of Service:  06/16/22 1230   Creation Time:  06/16/22 1230              Signed                 06/16/22 1035   Plan   Plan Faxed DME orders to [facility] via [employer].  Contacted [facility] [phone] per Kaykay who says [place] office will be seeing pt and referral information will be forwarded to their office.  Informed Kaykay pt is being discharged today and will need next dose of I.V. Vancomycin 1500 mg around 8pm per RN.  Faxed ambulatory referral for home health to  via [employer].  Discharge summary to be faxed when available.   will [facility] with [facility] office after they have received referral information.  Contacted [facility] [phone] per Allen about pt being discharged today and insurance coverage.  Allen says pt's insurance says preliminary eligibility, therefore, pt may have to pay the full cost of medication and supplies which is $672.99 until they verify insurance coverage.  Informed Allen pt has coverage with [facility] Community Plan of [place] until 6-30-22 and sister Ros applied for long-term Medicaid coverage.  Spoke to pt and sister Ros via phone with this information.   says pt has preliminary eligibility with Medicaid until 12-31-22 and she called Medicaid on 6-15-22 to check on status; she had to send them a letter from pt's employer with last date worked and last paycheck which they received on 6-7-22.  Representative informed  it may take up to 2 weeks before they have a decision.  SS reviewed this information with Allen with [employer].  Allen will ask Manager if pt could be billed instead of paying full cost of medication today (pt is willing to pay full cost if needed).  Allen says they will plan to deliver meds to sister's home around 8pm tonight.  Allen will call SS back.                 Corrina Mendez MSW       Case Management   Significant Note      Signed   Date of Service:  06/16/22 1229   Creation Time:  06/16/22 1229              Signed                 06/16/22 1200   Plan   Plan Spoke to Allen with [facility] [phone] who states they will bill pt and deliver I.V. medications to pt's home around 8:00 pm.  Reviewed this information with pt, sister and brother-in-law.  Pt is going home with sister and brother-in-law who will provide assistance.  Family will transport pt home.   Patient/Family in Agreement with Plan yes                 Corrina Mendez MSW       Case Management   Significant Note      Signed   Date of Service:  06/16/22 1221    Creation Time:  06/16/22 1228              Signed                 06/16/22 1135   Plan   Plan Spoke to Annabel Durand, who says pt will need Vanco trough level on 6-17-22 with goal range 15-20 mg/L or AUC of 400-600.  Contacted [facility]  [phone] per [address] [facility] with this information and reviewed wound care to pt's left midfoot amputation site:  clean with betadine, 4x4's, kerlix, ace wrap daily and apply ABD x 2 to back of heel/leg and I.V. Vancomycin 1500 mg every 12 hours until 6-23-22, next dose to be given around 8:00 pm tonight per RN.  Informed [email or web address] to deliver meds to pt's home around 8pm.  Faxed Pharmacist note to [facility] [phone].   nurse to go to pt's home around 8pm for I.V. meds.                 Corrina Mendez, MSW       Case Management   Significant Note      Signed   Date of Service:  06/16/22 1224   Creation Time:  06/16/22 1224              Signed                 06/16/22 1035   Plan   Plan Faxed DME orders to [facility] via [employer].  Contacted [facility] [phone] per Kaykay who says [place] office will be seeing pt and referral information will be forwarded to their office.  Informed Kaykay pt is being discharged today and will need next dose of I.V. Vancomycin 1500 mg around 8pm per RN.  Faxed ambulatory referral for home health to  via [employer].  Discharge summary to be faxed when available.  SS will follow-up with [facility] office after they have received referral information.  Contacted [employer] [phone] per Allen about pt being discharged today and insurance coverage.  Allen says pt's insurance says preliminary eligibility, therefore, pt may have to pay the full cost of medication and supplies which is $672.99 until they verify insurance coverage.  Informed Allen pt has coverage with [facility] Community Plan of [place] until 6-30-22 and sister Ros applied for long-term Medicaid coverage.  Spoke to pt and sister Ros via  phone with this information.  Sister says pt has preliminary eligibility with Medicaid until 12-31-22 and she called Medicaid on 6-15-22 to check on status; she had to send them a letter from pt's employer with last date worked and last paycheck which they received on 6-7-22.  Representative informed  it may take up to 2 weeks before they have a decision.   reviewed this information with Allen with [employer].  Allen will ask Manager if pt could be billed instead of paying full cost of medication today (pt is willing to pay full cost if needed).  Allen will call SS back.                 Corrina Mendez, MSW       Case Management   Significant Note      Signed   Date of Service:  06/15/22 1438   Creation Time:  06/15/22 1438              Signed                 06/15/22 1230   Plan   Plan Contacted [facility] [phone] per preference per Brii with referral, discharge on 6-16-22, need for I.V. Vancomycin 1500 mg every 12 hours until 6-23-22 (9am/9pm), wound care to left midfoot amputation site: clean with betadine, 4x4's, kerlix, ace wrap daily, routine midline care, PT 2-3 x wk for strengthening, endurance, gait training, transfer training, balance, therapeutic exercise, bed mobility, home safety, OT 2-3 x wk for ADL re-training, home safety, functional mobility, and strengthening.  Faxed face sheet, H&P, PT/OT notes, and MD progress notes to  via epic.  HH to be contacted at discharge.  Ambulatory referral for home health with face to face to be faxed at discharge.  Contacted [facility] per rotation per Allen [phone] with discharge on 6-16-22 and need forI.V. Vancomycin 1500 mg every 12 hours until 6-23-22.  Pt received I.V. Daptomycin from them recently.  Faxed face sheet, H&P, MD progress note, pharmacist progress note, Vanco trough level on 6-12-22 and medication list to [phone].  SS to contact [facility] on  22 about coverage and delivery to sister's home.                 Joe Mike MD    Physician   Medicine   Discharge Summary      Signed   Date of Service:  22 1220   Creation Time:  22 1246              Signed                   [facility] HOSPITALISTS DISCHARGE SUMMARY     Patient Identification:  Name:  Melchor Hardwick  Age:  57 y.o.  Sex:  male  :  1965  MRN:  [id number]  Visit Number:  [id number]     Date of Admission: 2022  Date of Discharge:  2022      PCP: Missy Up APRN     DISCHARGE DIAGNOSIS        CONSULTS         PROCEDURES PERFORMED        HOSPITAL COURSE  Patient is a 57 y.o. male presented to [facility]   acute inpatient physical rehab in transfer from [facility].  Patient is 57-year-old gentleman with a history of diabetes mellitus, hyperlipidemia, hypertension, perineal abscess drainage in the remote past.  Patient states that he injured his left foot approximately 10 days prior to his admission at look like a blood blister originally.  Patient was placed on oral antibiotics and he did complete the course.  4 days prior to his admission patient had an ulceration on this portion of the foot and began having increased erythema and edema.  Patient does have history of neuropathy from his diabetes mellitus.  Patient was brought in to [facility] with severe sepsis secondary to left foot osteomyelitis.  Diabetes was uncontrolled.  Was taken to the OR by podiatry for left foot washout and IV antibiotics.  Unfortunately patient did not improve and did require midfoot amputation.  Patient was treated for bacteremia as well for MRSA.  Patient also anasarca during his admission and was diagnosed with cirrhosis likely secondary to [place] with the edema likely from portal hypertension.  She did improve with diuresis but was generally weak afterwards and was admitted to our service for treatment of critical illness myopathy.     Critical  illness myopathy--at the time of admission patient was requiring maximum assistance for up with bathing; set up for upper body dressing; maximum assistance for lower body dressing; set up for grooming; total assistance with Ortiz catheter for toileting; set up for self-feeding.  Patient at the time of admission required contact-guard for bed mobility, minimum assistance for transfers; and did not ambulate secondary to nonweightbearing on left.  At the time of discharge, patient requires set up for bathing, set up for upper body dressing; set up for lower body dressing; moderately independent for grooming; supervision for toileting.  Patient requires contact-guard for transfers; patient able to ambulate 20 feet x 3 with front wheel walker and contact-guard.  His gait or Aird ability to ambulate is impeded by his weightbearing status at this time.     Left midfoot amputation with MRSA and recent MRSA bacteremia--patient has been on vancomycin throughout the admission and is done very well.  Patient is to continue his vancomycin which we will arranged for patient to get at home through 6/23/2022.  Patient's wound on left foot is healing well.  Patient was evaluated by podiatry at the end of the stay and some of the sutures were removed.  Patient is still nonweightbearing on the affected port part of the foot but is able to do slight weightbearing on heel at this time.  Patient will follow up with Dr. Colby in the next 1 to 2 weeks     UTI patient currently on Omnicef 300 mg twice daily for total of 10 days of treatment.  Patient awaiting culture and sensitivity results.     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  I will arrange for patient to have outpatient GI consultation as patient may benefit from EGD to evaluate for potential varices.  Continue Lasix 20 mg twice daily; continue nadolol and Aldactone     Thrombocytopenia likely secondary to cirrhosis has been stable      Diabetes mellitus has been reasonably well controlled throughout the admission.     Patient will require wheelchair at home secondary to mobility limitation as he is not able to weight bear weight on left foot at this time.  This was makes mobility for him difficult especially for toileting, feeding and bathing.  Patient will likely require a wheelchair until wound is cleared by podiatry.  Patient still has wound that is healing at this time and is status post midfoot amputation        VITAL SIGNS:  Temp:  [97.7 °F (36.5 °C)-97.9 °F (36.6 °C)] 97.9 °F (36.6 °C)  Heart Rate:  [70-75] 70  Resp:  [20] 20  BP: (112-122)/(68-70) 112/70  SpO2:  [99 %] 99 %  on  Flow (L/min):  [3] 3;   Device (Oxygen Therapy): room air     Body mass index is 36.92 kg/m².      Wt Readings from Last 3 Encounters:   06/16/22 117 kg (257 lb 4.8 oz)   06/02/22 117 kg (258 lb)         PHYSICAL EXAM:  Constitutional: No acute distress  HEENT: Normocephalic atraumatic  Neck:   Supple  Cardiovascular: Regular rate and rhythm  Pulmonary/Chest: Clear to auscultation  Abdominal: Positive bowel sounds soft.   Musculoskeletal: Left foot--midfoot amputation.  Wound appears to be healing well some eschar still noted.  Neurological: No focal deficits  Skin: As above  Peripheral vascular:  Genitourinary::     DISCHARGE DISPOSITION   Stable     DISCHARGE MEDICATIONS:           Discharge Medications            New Medications      Instructions Start Date   aspirin 81 MG EC tablet    81 mg, Oral, Daily    Start Date: June 17, 2022      atorvastatin 40 MG tablet  Commonly known as: LIPITOR    40 mg, Oral, Nightly        bisacodyl 5 MG EC tablet  Commonly known as: DULCOLAX    5 mg, Oral, Daily PRN        cefdinir 300 MG capsule  Commonly known as: OMNICEF    300 mg, Oral, Every 12 Hours Scheduled        furosemide 40 MG tablet  Commonly known as: LASIX    40 mg, Oral, 2 Times Daily        Insulin Aspart 100 UNIT/ML injection  Commonly known as: novoLOG    10  Units, Subcutaneous, 3 Times Daily Before Meals        Insulin Glargine 100 UNIT/ML injection pen  Commonly known as: LANTUS SOLOSTAR    10 Units, Subcutaneous, 2 Times Daily        iron polysaccharides 150 MG capsule  Commonly known as: NIFEREX    150 mg, Oral, Daily    Start Date: June 17, 2022      nadolol 20 MG tablet  Commonly known as: CORGARD    20 mg, Oral, Every 24 Hours Scheduled    Start Date: June 17, 2022      nystatin 125251 UNIT/GM powder  Commonly known as: MYCOSTATIN    Topical, Every 12 Hours Scheduled        pantoprazole 40 MG EC tablet  Commonly known as: PROTONIX    40 mg, Oral, Every Early Morning    Start Date: June 17, 2022      Pen Needles 31G X 5 MM misc    1 each, Subcutaneous, 2 Times Daily, Formulary Compliance Approval        polyethylene glycol 17 g packet  Commonly known as: MIRALAX    17 g, Oral, Daily PRN        spironolactone 50 MG tablet  Commonly known as: ALDACTONE    50 mg, Oral, Daily    Start Date: June 17, 2022      vancomycin    1,500 mg, Intravenous, Every 12 Hours                      Changes to Medications      Instructions Start Date   metFORMIN 500 MG tablet  Commonly known as: GLUCOPHAGE  What changed:   · medication strength  · how much to take    500 mg, Oral, 2 Times Daily With Meals                      Continue These Medications      Instructions Start Date   multivitamin tablet tablet    1 tablet, Oral, Daily            Stop These Medications    glimepiride 4 MG tablet  Commonly known as: AMARYL      hydroCHLOROthiazide 12.5 MG tablet  Commonly known as: HYDRODIURIL      insulin  UNIT/ML injection  Commonly known as: humuLIN N,novoLIN N      losartan 100 MG tablet  Commonly known as: COZAAR      lovastatin 10 MG tablet  Commonly known as: MEVACOR                        Your medication list              START taking these medications      Instructions Last Dose Given Next Dose Due   aspirin 81 MG EC tablet  Start taking on: June 17, 2022       Take 1  tablet by mouth Daily for 30 days.          atorvastatin 40 MG tablet  Commonly known as: LIPITOR       Take 1 tablet by mouth Every Night for 30 days.          bisacodyl 5 MG EC tablet  Commonly known as: DULCOLAX       Take 1 tablet by mouth Daily As Needed for Constipation (Use if polyethylene glycol is ineffective) for up to 10 days.          cefdinir 300 MG capsule  Commonly known as: OMNICEF       Take 1 capsule by mouth Every 12 (Twelve) Hours for 11 doses. Indications: Urinary Tract Infection          furosemide 40 MG tablet  Commonly known as: LASIX       Take 1 tablet by mouth 2 (Two) Times a Day for 30 days.          Insulin Aspart 100 UNIT/ML injection  Commonly known as: novoLOG       Inject 10 Units under the skin into the appropriate area as directed 3 (Three) Times a Day Before Meals for 30 days.          Insulin Glargine 100 UNIT/ML injection pen  Commonly known as: LANTUS SOLOSTAR       Inject 10 Units under the skin into the appropriate area as directed 2 (Two) Times a Day.          iron polysaccharides 150 MG capsule  Commonly known as: NIFEREX  Start taking on: June 17, 2022       Take 1 capsule by mouth Daily for 30 days.          nadolol 20 MG tablet  Commonly known as: CORGARD  Start taking on: June 17, 2022       Take 1 tablet by mouth Daily for 30 days.          nystatin 906488 UNIT/GM powder  Commonly known as: MYCOSTATIN       Apply  topically to the appropriate area as directed Every 12 (Twelve) Hours.          pantoprazole 40 MG EC tablet  Commonly known as: PROTONIX  Start taking on: June 17, 2022       Take 1 tablet by mouth Every Morning for 30 days.          Pen Needles 31G X 5 MM misc       Inject 1 each under the skin into the appropriate area as directed 2 (Two) Times a Day. Formulary Compliance Approval          polyethylene glycol 17 g packet  Commonly known as: MIRALAX       Take 17 g by mouth Daily As Needed (Use if senna-docusate is ineffective) for up to 30 days.           spironolactone 50 MG tablet  Commonly known as: ALDACTONE  Start taking on: June 17, 2022       Take 1 tablet by mouth Daily for 30 days.          vancomycin       Infuse 500 mL into a venous catheter Every 12 (Twelve) Hours for 14 doses. Indications: Bone and/or Joint Infection              CHANGE how you take these medications      Instructions Last Dose Given Next Dose Due   metFORMIN 500 MG tablet  Commonly known as: GLUCOPHAGE  What changed:   · medication strength  · how much to take       Take 1 tablet by mouth 2 (Two) Times a Day With Meals for 30 days.                            CONTINUE taking these medications      Instructions Last Dose Given Next Dose Due   multivitamin tablet tablet       Take 1 tablet by mouth Daily.                          STOP taking these medications          glimepiride 4 MG tablet  Commonly known as: AMARYL         hydroCHLOROthiazide 12.5 MG tablet  Commonly known as: HYDRODIURIL         insulin  UNIT/ML injection  Commonly known as: humuLIN N,novoLIN N         losartan 100 MG tablet  Commonly known as: COZAAR         lovastatin 10 MG tablet  Commonly known as: MEVACOR                             Where to Get Your Medications             These medications were sent to [facility] [address] [place], [place] [phone]  - [phone] [place] [address]     Phone: [phone] ·   aspirin 81 MG EC tablet  · atorvastatin 40 MG tablet  · bisacodyl 5 MG EC tablet  · cefdinir 300 MG capsule  · furosemide 40 MG tablet  · Insulin Aspart 100 UNIT/ML injection  · Insulin Glargine 100 UNIT/ML injection pen  · iron polysaccharides 150 MG capsule  · metFORMIN 500 MG tablet  · multivitamin tablet tablet  · nadolol 20 MG tablet  · nystatin 559531 UNIT/GM powder  · pantoprazole 40 MG EC tablet  · Pen Needles 31G X 5 MM misc  · polyethylene glycol 17 g packet  · spironolactone 50 MG tablet      Information about where to get these  medications is not yet available    Ask your nurse or doctor about these medications  · vancomycin                 Diet Instructions      Diet: Consistent Carbohydrate       Discharge Diet: Consistent Carbohydrate          Activity Instructions    Per PT            No future appointments.       Additional Instructions for the Follow-ups that You Need to Schedule      Ambulatory Referral to Home Health   As directed        Face to Face Visit Date: 6/16/2022    Follow-up provider for Plan of Care?: I treated the patient in an acute care facility and will not continue treatment after discharge.    Follow-up provider: MISSY UP [[id number]]    Reason/Clinical Findings: recent forefoot ampuation/iv antibiiotcs    Describe mobility limitations that make leaving home difficult: recent foot ampuation    Nursing/Therapeutic Services Requested: Skilled Nursing Physical Therapy Occupational Therapy    Skilled nursing orders: Wound care dressing/changes (midline care) Infusion therapy Other    Instructions: vancomycin 1500mg q12h until 6/23/22; wound care to left midfoot amputation site--clean with betadine, 4x4, kerlix, ace wrap daily--apply abd x2to back of heel/leg    PT orders: Therapeutic exercise Gait Training Transfer training Strengthening    Weight Bearing Status: Non-Weight Bearing    Occupational orders: Activities of daily living Strengthening Home safety assessment    Frequency: 1 Week 1 (may place partial wt on affected foot at heel)           Discharge Follow-up with PCP   As directed         Currently Documented PCP:    Missy Up APRN    PCP Phone Number:    [phone]      Follow Up Details: one week---followup of recent sepsis/partial foot amputation/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 Go on 6/24/2022.    Specialties: Nurse Practitioner, Family Medicine  Why: at 10:15 AM for hospital follow up.  Contact information:  [address] [facility] [address]  [phone]                       Addison Colby MD. Go on 6/20/2022.    Specialty: Podiatry  Why: at 9:00 AM with Nurse Practitioner Anusha for surgery follow-up.  Contact information:  [address] [place] [address]  [phone]                       Missy Up APRN .    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    Service: Durable Medical Equipment  Contact information:  [address] [address]  [phone]                                 Home Medical Care           [facility] .    Services: Home Health Services, Home Nursing, Home Rehabilitation  Contact information:  [address] [id number]  [phone]                                      TEST  RESULTS PENDING AT DISCHARGE     CODE STATUS      Code Status and Medical Interventions:   Ordered at: 06/02/22 1707     Level Of Support Discussed With:     Patient     Code Status (Patient has no pulse and is not breathing):     CPR (Attempt to Resuscitate)     Medical Interventions (Patient has pulse or is breathing):     Full Support         Joe Mike MD  [facility] Hospitalist  06/16/22  12:47 EDT

## 2022-06-16 NOTE — PROGRESS NOTES
Patient Assessment Instrument  Quality Indicators - Discharge    Section GG. Self-Care Performance      Section GG. Mobility Performance     Roll Left and Right: [place] sets up or cleans up; patient completes activity.  [place] assists only prior to or following the activity.   Sit to Lying: [place] sets up or cleans up; patient completes activity. [place]  assists only prior to or following the activity.   Lying to Sitting on Side of Bed: [place] provides verbal cues and/or  touching/steadying and/or contact guard assistance as patient completes  activity. Assistance may be provided throughout the activity or intermittently.   Sit to Stand: [place] provides verbal cues and/or touching/steadying and/or  contact guard assistance as patient completes activity. Assistance may be  provided throughout the activity or intermittently.   Chair/Bed to Chair Transfer: [place] provides verbal cues and/or  touching/steadying and/or contact guard assistance as patient completes  activity. Assistance may be provided throughout the activity or intermittently.   Toilet Transfer [place] provides verbal cues and/or touching/steadying and/or  contact guard assistance as patient completes activity. Assistance may be  provided throughout the activity or intermittently.   Car Transfer: [place] provides verbal cues and/or touching/steadying and/or  contact guard assistance as patient completes activity. Assistance may be  provided throughout the activity or intermittently.   Walk 10 Feet:   [place] provides verbal cues and/or touching/steadying and/or  contact guard assistance as patient completes activity. Assistance may be  provided throughout the activity or intermittently.  Walk 50 Feet with 2 Turns:   Not attempted due to medical or safety concerns.  Walk 150 Feet:   Not attempted due to medical or safety concerns.  Walking 10 Feet on Uneven Surfaces:   Not attempted due to medical or safety  concerns.  1 Step Over Curb or Up/Down Stair:    Not attempted due to medical or safety  concerns.  Picking up an Object:   Not attempted due to medical or safety concerns. Uses  Wheelchair and/or Scooter: Yes  Wheel 50 Feet with Two Turns: Patient completed the activities by him/herself  with no assistance from a helper.  Type of Wheelchair or Scooter Used: Manual  Wheel 150 Feet: Patient completed the activities by him/herself with no  assistance from a helper.  Type of Wheelchair or Scooter Used: Manual    Section J. Health Conditions Discharge      Section M. Skin Conditions Discharge      . Current Number of Unhealed Pressure Ulcers      Section N. Medication    Signed by: Michi Mckeon PTA

## 2022-06-16 NOTE — SIGNIFICANT NOTE
06/16/22 1035   Plan   Plan Faxed DME orders to [facility] via [employer].  Contacted [facility] [phone] per Kaykay who says [place] office will be seeing pt and referral information will be forwarded to their office.  Informed Kaykay pt is being discharged today and will need next dose of I.V. Vancomycin 1500 mg around 8pm per RN.  Faxed ambulatory referral for home health to  via [employer].  Discharge summary to be faxed when available.  SS will follow-up with [facility] office after they have received referral information.  Contacted [employer] [phone] per Allen about pt being discharged today and insurance coverage.  Allen says pt's insurance says preliminary eligibility, therefore, pt may have to pay the full cost of medication and supplies which is $672.99 until they verify insurance coverage.  Informed Allen pt has coverage with [facility] Community Plan of [place] until 6-30-22 and sister Ros applied for long-term Medicaid coverage.  Spoke to pt and sister Ros via phone with this information.  Sister says pt has preliminary eligibility with Medicaid until 12-31-22 and she called Medicaid on 6-15-22 to check on status; she had to send them a letter from pt's employer with last date worked and last paycheck which they received on 6-7-22.  Representative informed  it may take up to 2 weeks before they have a decision.  SS reviewed this information with Allen with "[employer].  Allen will ask Manager if pt could be billed instead of paying full cost of medication today (pt is willing to pay full cost if needed).  Allen will call SS back.

## 2022-06-16 NOTE — DISCHARGE SUMMARY
[facility] HOSPITALISTS DISCHARGE SUMMARY    Patient Identification:  Name:  Melchor Hardwick  Age:  57 y.o.  Sex:  male  :  1965  MRN:  [id number]  Visit Number:  [id number]    Date of Admission: 2022  Date of Discharge:  2022     PCP: Missy Up APRN    DISCHARGE DIAGNOSIS      CONSULTS       PROCEDURES PERFORMED      HOSPITAL COURSE  Patient is a 57 y.o. male presented to [facility]   acute inpatient physical rehab in transfer from [facility].  Patient is 57-year-old gentleman with a history of diabetes mellitus, hyperlipidemia, hypertension, perineal abscess drainage in the remote past.  Patient states that he injured his left foot approximately 10 days prior to his admission at look like a blood blister originally.  Patient was placed on oral antibiotics and he did complete the course.  4 days prior to his admission patient had an ulceration on this portion of the foot and began having increased erythema and edema.  Patient does have history of neuropathy from his diabetes mellitus.  Patient was brought in to [facility] with severe sepsis secondary to left foot osteomyelitis.  Diabetes was uncontrolled.  Was taken to the OR by podiatry for left foot washout and IV antibiotics.  Unfortunately patient did not improve and did require midfoot amputation.  Patient was treated for bacteremia as well for MRSA.  Patient also anasarca during his admission and was diagnosed with cirrhosis likely secondary to [place] with the edema likely from portal hypertension.  She did improve with diuresis but was generally weak afterwards and was admitted to our service for treatment of critical illness myopathy.    Critical illness myopathy--at the time of admission patient was requiring maximum assistance for up with bathing; set up for upper body dressing; maximum assistance for lower body dressing; set up for grooming; total assistance with Ortiz catheter for toileting;  set up for self-feeding.  Patient at the time of admission required contact-guard for bed mobility, minimum assistance for transfers; and did not ambulate secondary to nonweightbearing on left.  At the time of discharge, patient requires set up for bathing, set up for upper body dressing; set up for lower body dressing; moderately independent for grooming; supervision for toileting.  Patient requires contact-guard for transfers; patient able to ambulate 20 feet x 3 with front wheel walker and contact-guard.  His gait or Aird ability to ambulate is impeded by his weightbearing status at this time.    Left midfoot amputation with MRSA and recent MRSA bacteremia--patient has been on vancomycin throughout the admission and is done very well.  Patient is to continue his vancomycin which we will arranged for patient to get at home through 6/23/2022.  Patient's wound on left foot is healing well.  Patient was evaluated by podiatry at the end of the stay and some of the sutures were removed.  Patient is still nonweightbearing on the affected port part of the foot but is able to do slight weightbearing on heel at this time.  Patient will follow up with Dr. Colby in the next 1 to 2 weeks    UTI patient currently on Omnicef 300 mg twice daily for total of 10 days of treatment.  Patient awaiting culture and sensitivity results.    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  I will arrange for patient to have outpatient GI consultation as patient may benefit from EGD to evaluate for potential varices.  Continue Lasix 20 mg twice daily; continue nadolol and Aldactone    Thrombocytopenia likely secondary to cirrhosis has been stable    Diabetes mellitus has been reasonably well controlled throughout the admission.    Patient will require wheelchair at home secondary to mobility limitation as he is not able to weight bear weight on left foot at this time.  This was makes mobility for him  difficult especially for toileting, feeding and bathing.  Patient will likely require a wheelchair until wound is cleared by podiatry.  Patient still has wound that is healing at this time and is status post midfoot amputation      VITAL SIGNS:  Temp:  [97.7 °F (36.5 °C)-97.9 °F (36.6 °C)] 97.9 °F (36.6 °C)  Heart Rate:  [70-75] 70  Resp:  [20] 20  BP: (112-122)/(68-70) 112/70  SpO2:  [99 %] 99 %  on  Flow (L/min):  [3] 3;   Device (Oxygen Therapy): room air    Body mass index is 36.92 kg/m².  Wt Readings from Last 3 Encounters:   06/16/22 117 kg (257 lb 4.8 oz)   06/02/22 117 kg (258 lb)       PHYSICAL EXAM:  Constitutional: No acute distress  HEENT: Normocephalic atraumatic  Neck:   Supple  Cardiovascular: Regular rate and rhythm  Pulmonary/Chest: Clear to auscultation  Abdominal: Positive bowel sounds soft.   Musculoskeletal: Left foot--midfoot amputation.  Wound appears to be healing well some eschar still noted.  Neurological: No focal deficits  Skin: As above  Peripheral vascular:  Genitourinary::    DISCHARGE DISPOSITION   Stable    DISCHARGE MEDICATIONS:     Discharge Medications      New Medications      Instructions Start Date   aspirin 81 MG EC tablet   81 mg, Oral, Daily   Start Date: June 17, 2022     atorvastatin 40 MG tablet  Commonly known as: LIPITOR   40 mg, Oral, Nightly      bisacodyl 5 MG EC tablet  Commonly known as: DULCOLAX   5 mg, Oral, Daily PRN      cefdinir 300 MG capsule  Commonly known as: OMNICEF   300 mg, Oral, Every 12 Hours Scheduled      furosemide 40 MG tablet  Commonly known as: LASIX   40 mg, Oral, 2 Times Daily      Insulin Aspart 100 UNIT/ML injection  Commonly known as: novoLOG   10 Units, Subcutaneous, 3 Times Daily Before Meals      Insulin Glargine 100 UNIT/ML injection pen  Commonly known as: LANTUS SOLOSTAR   10 Units, Subcutaneous, 2 Times Daily      iron polysaccharides 150 MG capsule  Commonly known as: NIFEREX   150 mg, Oral, Daily   Start Date: June 17, 2022      nadolol 20 MG tablet  Commonly known as: CORGARD   20 mg, Oral, Every 24 Hours Scheduled   Start Date: June 17, 2022     nystatin 946100 UNIT/GM powder  Commonly known as: MYCOSTATIN   Topical, Every 12 Hours Scheduled      pantoprazole 40 MG EC tablet  Commonly known as: PROTONIX   40 mg, Oral, Every Early Morning   Start Date: June 17, 2022     Pen Needles 31G X 5 MM misc   1 each, Subcutaneous, 2 Times Daily, Formulary Compliance Approval      polyethylene glycol 17 g packet  Commonly known as: MIRALAX   17 g, Oral, Daily PRN      spironolactone 50 MG tablet  Commonly known as: ALDACTONE   50 mg, Oral, Daily   Start Date: June 17, 2022     vancomycin   1,500 mg, Intravenous, Every 12 Hours         Changes to Medications      Instructions Start Date   metFORMIN 500 MG tablet  Commonly known as: GLUCOPHAGE  What changed:   · medication strength  · how much to take   500 mg, Oral, 2 Times Daily With Meals         Continue These Medications      Instructions Start Date   multivitamin tablet tablet   1 tablet, Oral, Daily         Stop These Medications    glimepiride 4 MG tablet  Commonly known as: AMARYL     hydroCHLOROthiazide 12.5 MG tablet  Commonly known as: HYDRODIURIL     insulin  UNIT/ML injection  Commonly known as: humuLIN N,novoLIN N     losartan 100 MG tablet  Commonly known as: COZAAR     lovastatin 10 MG tablet  Commonly known as: MEVACOR             Your medication list      START taking these medications      Instructions Last Dose Given Next Dose Due   aspirin 81 MG EC tablet  Start taking on: June 17, 2022      Take 1 tablet by mouth Daily for 30 days.       atorvastatin 40 MG tablet  Commonly known as: LIPITOR      Take 1 tablet by mouth Every Night for 30 days.       bisacodyl 5 MG EC tablet  Commonly known as: DULCOLAX      Take 1 tablet by mouth Daily As Needed for Constipation (Use if polyethylene glycol is ineffective) for up to 10 days.       cefdinir 300 MG capsule  Commonly known  as: OMNICEF      Take 1 capsule by mouth Every 12 (Twelve) Hours for 11 doses. Indications: Urinary Tract Infection       furosemide 40 MG tablet  Commonly known as: LASIX      Take 1 tablet by mouth 2 (Two) Times a Day for 30 days.       Insulin Aspart 100 UNIT/ML injection  Commonly known as: novoLOG      Inject 10 Units under the skin into the appropriate area as directed 3 (Three) Times a Day Before Meals for 30 days.       Insulin Glargine 100 UNIT/ML injection pen  Commonly known as: LANTUS SOLOSTAR      Inject 10 Units under the skin into the appropriate area as directed 2 (Two) Times a Day.       iron polysaccharides 150 MG capsule  Commonly known as: NIFEREX  Start taking on: June 17, 2022      Take 1 capsule by mouth Daily for 30 days.       nadolol 20 MG tablet  Commonly known as: CORGARD  Start taking on: June 17, 2022      Take 1 tablet by mouth Daily for 30 days.       nystatin 174903 UNIT/GM powder  Commonly known as: MYCOSTATIN      Apply  topically to the appropriate area as directed Every 12 (Twelve) Hours.       pantoprazole 40 MG EC tablet  Commonly known as: PROTONIX  Start taking on: June 17, 2022      Take 1 tablet by mouth Every Morning for 30 days.       Pen Needles 31G X 5 MM misc      Inject 1 each under the skin into the appropriate area as directed 2 (Two) Times a Day. Formulary Compliance Approval       polyethylene glycol 17 g packet  Commonly known as: MIRALAX      Take 17 g by mouth Daily As Needed (Use if senna-docusate is ineffective) for up to 30 days.       spironolactone 50 MG tablet  Commonly known as: ALDACTONE  Start taking on: June 17, 2022      Take 1 tablet by mouth Daily for 30 days.       vancomycin      Infuse 500 mL into a venous catheter Every 12 (Twelve) Hours for 14 doses. Indications: Bone and/or Joint Infection          CHANGE how you take these medications      Instructions Last Dose Given Next Dose Due   metFORMIN 500 MG tablet  Commonly known as:  GLUCOPHAGE  What changed:   · medication strength  · how much to take      Take 1 tablet by mouth 2 (Two) Times a Day With Meals for 30 days.          CONTINUE taking these medications      Instructions Last Dose Given Next Dose Due   multivitamin tablet tablet      Take 1 tablet by mouth Daily.          STOP taking these medications    glimepiride 4 MG tablet  Commonly known as: AMARYL        hydroCHLOROthiazide 12.5 MG tablet  Commonly known as: HYDRODIURIL        insulin  UNIT/ML injection  Commonly known as: humuLIN N,novoLIN N        losartan 100 MG tablet  Commonly known as: COZAAR        lovastatin 10 MG tablet  Commonly known as: MEVACOR              Where to Get Your Medications      These medications were sent to [facility] [address] [place] [address] - [phone]  - [phone] [address] [address]    Phone: [phone]   · aspirin 81 MG EC tablet  · atorvastatin 40 MG tablet  · bisacodyl 5 MG EC tablet  · cefdinir 300 MG capsule  · furosemide 40 MG tablet  · Insulin Aspart 100 UNIT/ML injection  · Insulin Glargine 100 UNIT/ML injection pen  · iron polysaccharides 150 MG capsule  · metFORMIN 500 MG tablet  · multivitamin tablet tablet  · nadolol 20 MG tablet  · nystatin [id number] UNIT/GM powder  · pantoprazole 40 MG EC tablet  · Pen Needles 31G X 5 MM misc  · polyethylene glycol 17 g packet  · spironolactone 50 MG tablet     Information about where to get these medications is not yet available    Ask your nurse or doctor about these medications  · vancomycin          Diet Instructions     Diet: Consistent Carbohydrate      Discharge Diet: Consistent Carbohydrate        Activity Instructions    Per PT         No future appointments.  Additional Instructions for the Follow-ups that You Need to Schedule     Ambulatory Referral to Home Health   As directed      Face to Face Visit Date: 6/16/2022    Follow-up provider for Plan of Care?: I treated the patient  in an acute care facility and will not continue treatment after discharge.    Follow-up provider: MISSY HAGER [[id number]]    Reason/Clinical Findings: recent forefoot ampuation/iv antibiiotcs    Describe mobility limitations that make leaving home difficult: recent foot ampuation    Nursing/Therapeutic Services Requested: Skilled Nursing Physical Therapy Occupational Therapy    Skilled nursing orders: Wound care dressing/changes (midline care) Infusion therapy Other    Instructions: vancomycin 1500mg q12h until 6/23/22; wound care to left midfoot amputation site--clean with betadine, 4x4, kerlix, ace wrap daily--apply abd x2to back of heel/leg    PT orders: Therapeutic exercise Gait Training Transfer training Strengthening    Weight Bearing Status: Non-Weight Bearing    Occupational orders: Activities of daily living Strengthening Home safety assessment    Frequency: 1 Week 1 (may place partial wt on affected foot at heel)         Discharge Follow-up with PCP   As directed       Currently Documented PCP:    Missy Hager APRN    PCP Phone Number:    [phone]     Follow Up Details: one week---followup of recent sepsis/partial foot amputation/cirrhosis secondary to [place]/recheck bmp         Discharge Follow-up with Specified Provider: Ania THOMAS-britton; 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 Go on 6/24/2022.    Specialties: Nurse Practitioner, Family Medicine  Why: at 10:15 AM for hospital follow up.  Contact information:  [address] [place] [address]  [phone]             Addison Colby MD. Go on 6/20/2022.    Specialty: Podiatry  Why: at 9:00 AM with Nurse Practitioner Anusha for surgery follow-up.  Contact information:  [address] [place]  [address]  [phone]             Missy Up APRN .    Specialties: Nurse Practitioner, Family Medicine  Why: one week---followup of recent sepsis/partial foot amputation/cirrhosis secondary to [place]/recheck bmp  Contact information:  [address] [facility] [address]  [phone]                   Contact information for after-discharge care     Durable Medical Equipment     [facility] .    Service: Durable Medical Equipment  Contact information:  [address] [facility] [address]  [phone]                 Home Medical Care     [facility] .    Services: Home Health Services, Home Nursing, Home Rehabilitation  Contact information:  [address] [facility] [address]  [phone]                              TEST  RESULTS PENDING AT DISCHARGE       CODE STATUS  Code Status and Medical Interventions:   Ordered at: 06/02/22 3943     Level Of Support Discussed With:    Patient     Code Status (Patient has no pulse and is not breathing):    CPR (Attempt to Resuscitate)     Medical Interventions (Patient has pulse or is breathing):    Full Support       Joe Mike MD  [facility] Hospitalist  06/16/22  12:47 EDT    Please note that this discharge summary required less than 30 minutes to complete.

## 2022-06-16 NOTE — PROGRESS NOTES
Patient Assessment Instrument  Quality Indicators - Discharge    Section GG. Self-Care Performance      Section GG. Mobility Performance      Section J. Health Conditions Discharge      Section M. Skin Conditions Discharge  Unhealed Pressure Ulcer(s)/Injurie(s) at Stage 1 or Higher:  No    . Current Number of Unhealed Pressure Ulcers      Section N. Medication    Signed by: Kimmie Mcdaniel Nurse

## 2022-06-17 ENCOUNTER — HOSPITAL ENCOUNTER (EMERGENCY)
Facility: HOSPITAL | Age: 57
Discharge: HOME | End: 2022-06-17
Attending: STUDENT IN AN ORGANIZED HEALTH CARE EDUCATION/TRAINING PROGRAM | Admitting: STUDENT IN AN ORGANIZED HEALTH CARE EDUCATION/TRAINING PROGRAM

## 2022-06-17 ENCOUNTER — READMISSION MANAGEMENT (OUTPATIENT)
Dept: CALL CENTER | Facility: HOSPITAL | Age: 57
End: 2022-06-17

## 2022-06-17 VITALS
OXYGEN SATURATION: 100 % | BODY MASS INDEX: 36.36 KG/M2 | WEIGHT: 254 LBS | RESPIRATION RATE: 13 BRPM | TEMPERATURE: 97.4 F | SYSTOLIC BLOOD PRESSURE: 130 MMHG | HEART RATE: 74 BPM | DIASTOLIC BLOOD PRESSURE: 78 MMHG | HEIGHT: 70 IN

## 2022-06-17 DIAGNOSIS — Z48.89 ENCOUNTER FOR POST SURGICAL WOUND CHECK: Primary | ICD-10-CM

## 2022-06-17 LAB
ALBUMIN SERPL-MCNC: 2.46 G/DL (ref 3.5–5.2)
ALBUMIN/GLOB SERPL: 0.6 G/DL
ALP SERPL-CCNC: 350 U/L (ref 39–117)
ALT SERPL W P-5'-P-CCNC: 33 U/L (ref 1–41)
ANION GAP SERPL CALCULATED.3IONS-SCNC: 11.4 MMOL/L (ref 5–15)
AST SERPL-CCNC: 38 U/L (ref 1–40)
BASOPHILS # BLD AUTO: 0.01 10*3/MM3 (ref 0–0.2)
BASOPHILS NFR BLD AUTO: 0.2 % (ref 0–1.5)
BILIRUB SERPL-MCNC: 0.5 MG/DL (ref 0–1.2)
BUN SERPL-MCNC: 16 MG/DL (ref 6–20)
BUN/CREAT SERPL: 18.4 (ref 7–25)
CALCIUM SPEC-SCNC: 8.2 MG/DL (ref 8.6–10.5)
CHLORIDE SERPL-SCNC: 98 MMOL/L (ref 98–107)
CO2 SERPL-SCNC: 22.6 MMOL/L (ref 22–29)
CREAT SERPL-MCNC: 0.87 MG/DL (ref 0.76–1.27)
CRP SERPL-MCNC: 5.49 MG/DL (ref 0–0.5)
DEPRECATED RDW RBC AUTO: 49.6 FL (ref 37–54)
EGFRCR SERPLBLD CKD-EPI 2021: 100.6 ML/MIN/1.73
EOSINOPHIL # BLD AUTO: 0.18 10*3/MM3 (ref 0–0.4)
EOSINOPHIL NFR BLD AUTO: 4.1 % (ref 0.3–6.2)
ERYTHROCYTE [DISTWIDTH] IN BLOOD BY AUTOMATED COUNT: 16.9 % (ref 12.3–15.4)
ERYTHROCYTE [SEDIMENTATION RATE] IN BLOOD: 67 MM/HR (ref 0–20)
GLOBULIN UR ELPH-MCNC: 4 GM/DL
GLUCOSE SERPL-MCNC: 271 MG/DL (ref 65–99)
HCT VFR BLD AUTO: 38.2 % (ref 37.5–51)
HGB BLD-MCNC: 11.7 G/DL (ref 13–17.7)
IMM GRANULOCYTES # BLD AUTO: 0.01 10*3/MM3 (ref 0–0.05)
IMM GRANULOCYTES NFR BLD AUTO: 0.2 % (ref 0–0.5)
LYMPHOCYTES # BLD AUTO: 1.17 10*3/MM3 (ref 0.7–3.1)
LYMPHOCYTES NFR BLD AUTO: 26.8 % (ref 19.6–45.3)
MCH RBC QN AUTO: 24.9 PG (ref 26.6–33)
MCHC RBC AUTO-ENTMCNC: 30.6 G/DL (ref 31.5–35.7)
MCV RBC AUTO: 81.3 FL (ref 79–97)
MONOCYTES # BLD AUTO: 0.34 10*3/MM3 (ref 0.1–0.9)
MONOCYTES NFR BLD AUTO: 7.8 % (ref 5–12)
NEUTROPHILS NFR BLD AUTO: 2.65 10*3/MM3 (ref 1.7–7)
NEUTROPHILS NFR BLD AUTO: 60.9 % (ref 42.7–76)
NRBC BLD AUTO-RTO: 0 /100 WBC (ref 0–0.2)
PLATELET # BLD AUTO: 94 10*3/MM3 (ref 140–450)
PMV BLD AUTO: 10.1 FL (ref 6–12)
POTASSIUM SERPL-SCNC: 3.7 MMOL/L (ref 3.5–5.2)
PROT SERPL-MCNC: 6.5 G/DL (ref 6–8.5)
RBC # BLD AUTO: 4.7 10*6/MM3 (ref 4.14–5.8)
SODIUM SERPL-SCNC: 132 MMOL/L (ref 136–145)
WBC NRBC COR # BLD: 4.36 10*3/MM3 (ref 3.4–10.8)

## 2022-06-17 PROCEDURE — 80053 COMPREHEN METABOLIC PANEL: CPT | Performed by: PHYSICIAN ASSISTANT

## 2022-06-17 PROCEDURE — 85652 RBC SED RATE AUTOMATED: CPT | Performed by: PHYSICIAN ASSISTANT

## 2022-06-17 PROCEDURE — 99282 EMERGENCY DEPT VISIT SF MDM: CPT

## 2022-06-17 PROCEDURE — 86140 C-REACTIVE PROTEIN: CPT | Performed by: PHYSICIAN ASSISTANT

## 2022-06-17 PROCEDURE — 85025 COMPLETE CBC W/AUTO DIFF WBC: CPT | Performed by: PHYSICIAN ASSISTANT

## 2022-06-17 PROCEDURE — 36415 COLL VENOUS BLD VENIPUNCTURE: CPT

## 2022-06-17 NOTE — OUTREACH NOTE
Prep Survey    Flowsheet Row Responses   [facility] facility patient discharged from? [place]   Is LACE score < 7 ? No   Emergency Room discharge w/ pulse ox? No   Eligibility Not Eligible   What are the reasons patient is not eligible? Other  [Low risk for readmit]   Does the patient have one of the following disease processes/diagnoses(primary or secondary)? Other   Prep survey completed? Yes          VIKRAM ODEN - Registered Nurse

## 2022-06-17 NOTE — ED PROVIDER NOTES
Subjective   Patient is a 57-year-old male with hypertension, hyperlipidemia, and diabetes that presents to the ED for wound check.  He recently underwent partial amputation of the left foot done by Dr. Colby.  He states he has been doing daily dressing changes and receiving IV antibiotics.  He states he was just released from the rehab yesterday.  He states the splint that they made from has been rubbing a blister on the anterior leg.  He states he just wanted to have this checked to make sure that it was not becoming infected.  He denies chest pain, shortness of breath, fever, chills, nausea, vomiting, headache, dizziness, abdominal pain, dysuria, diarrhea, or constipation.      History provided by:  Patient   used: No        Review of Systems   Constitutional: Negative.  Negative for chills, diaphoresis, fatigue and fever.   HENT: Negative.  Negative for congestion, drooling, ear discharge, ear pain, facial swelling, rhinorrhea, sinus pressure, sinus pain, sneezing, sore throat, tinnitus and trouble swallowing.    Eyes: Negative.  Negative for photophobia, pain, discharge, redness and itching.   Respiratory: Negative.  Negative for cough, shortness of breath and wheezing.    Cardiovascular: Negative.  Negative for chest pain.   Gastrointestinal: Negative.  Negative for abdominal distention, abdominal pain, constipation, diarrhea, nausea and vomiting.   Endocrine: Negative.    Genitourinary: Negative.  Negative for dysuria.   Musculoskeletal: Negative.  Negative for arthralgias, back pain, gait problem, joint swelling, myalgias, neck pain and neck stiffness.   Skin: Negative.  Negative for wound.   Neurological: Negative.  Negative for dizziness, tremors, seizures, syncope, facial asymmetry, speech difficulty, weakness, light-headedness, numbness and headaches.   Psychiatric/Behavioral: Negative.  Negative for confusion.   All other systems reviewed and are negative.      Past Medical History:    Diagnosis Date   • Diabetes mellitus (HCC)    • Elevated cholesterol    • Hyperlipidemia    • Hypertension        No Known Allergies    Past Surgical History:   Procedure Laterality Date   • ABSCESS DRAINAGE      PERINEUM   • AMPUTATION FOOT Left 5/18/2022    Procedure: AMPUTATION FOOT;  Surgeon: Addison Colby MD;  Location: [facility];  Service: Podiatry;  Laterality: Left;   • INCISION AND DRAINAGE LEG Left 05/10/2022    Procedure: INCISION AND DRAINAGE LOWER EXTREMITY;  Surgeon: Addison Colby MD;  Location: [facility] OR;  Service: Podiatry;  Laterality: Left;   • WOUND DEBRIDEMENT Left 05/13/2022    Procedure: DEBRIDEMENT FOOT;  Surgeon: Addison Colby MD;  Location: [facility] OR;  Service: Podiatry;  Laterality: Left;       No family history on file.    Social History     Socioeconomic History   • Marital status: Single   Tobacco Use   • Smoking status: Never Smoker   • Smokeless tobacco: Never Used   Substance and Sexual Activity   • Alcohol use: Never   • Drug use: Never   • Sexual activity: Defer           Objective   Physical Exam  Vitals and nursing note reviewed.   Constitutional:       General: He is not in acute distress.     Appearance: He is well-developed. He is not diaphoretic.   HENT:      Head: Normocephalic and atraumatic.      Right Ear: External ear normal.      Left Ear: External ear normal.      Nose: Nose normal.      Mouth/Throat:      Mouth: Mucous membranes are moist.      Pharynx: Oropharynx is clear.   Eyes:      Conjunctiva/sclera: Conjunctivae normal.      Pupils: Pupils are equal, round, and reactive to light.   Neck:      Vascular: No JVD.      Trachea: No tracheal deviation.   Cardiovascular:      Rate and Rhythm: Normal rate and regular rhythm.      Heart sounds: Normal heart sounds. No murmur heard.  Pulmonary:      Effort: Pulmonary effort is normal. No respiratory distress.      Breath sounds: Normal breath sounds. No wheezing.   Abdominal:      General: Bowel sounds are  normal. There is no distension.      Palpations: Abdomen is soft.      Tenderness: There is no abdominal tenderness. There is no guarding or rebound.   Musculoskeletal:         General: No deformity. Normal range of motion.      Cervical back: Normal range of motion and neck supple.      Right lower leg: No edema.      Left lower leg: No edema.   Skin:     General: Skin is warm and dry.      Coloration: Skin is not pale.      Findings: No erythema or rash.   Neurological:      General: No focal deficit present.      Mental Status: He is alert and oriented to person, place, and time.      Cranial Nerves: No cranial nerve deficit.   Psychiatric:         Mood and Affect: Mood normal.         Behavior: Behavior normal.         Thought Content: Thought content normal.         Procedures           ED Course  ED Course as of 06/17/22 1856 Fri Jun 17, 2022 1852 Discussed plan of care with patient.  Advised if symptoms worsen return to ED.  Advised to keep scheduled appointment on Monday with Dr. Colby. [MH]      ED Course User Index  [MH] Joan Zarate PA-C                                                 [facility]    Final diagnoses:   Encounter for post surgical wound check       ED Disposition  ED Disposition     ED Disposition   Discharge    Condition   Stable    Comment   --             Addison Colby MD  [address] [place] [address] [address] [place] [address]  [phone]    Schedule an appointment as soon as possible for a visit on 6/20/2022           Medication List      No changes were made to your prescriptions during this visit.          Joan Zarate PA-C  06/17/22 1856

## 2022-06-17 NOTE — PROGRESS NOTES
Patient Assessment Instrument  Quality Indicators - Discharge    Section GG. Self-Care Performance      Section GG. Mobility Performance      Section J. Health Conditions Discharge  Fall(s) Since Admission:  No    Section M. Skin Conditions Discharge      . Current Number of Unhealed Pressure Ulcers      Section N. Medication    Medication Intervention: N/A - There were no potential clinically significant  medication issues identified since admission or patient is not taking any  medications.    Signed by: Jaja Ramirez, Supervisor

## 2022-06-18 ENCOUNTER — LAB (OUTPATIENT)
Dept: LAB | Facility: HOSPITAL | Age: 57
End: 2022-06-18

## 2022-06-18 ENCOUNTER — TRANSCRIBE ORDERS (OUTPATIENT)
Dept: ADMINISTRATIVE | Facility: HOSPITAL | Age: 57
End: 2022-06-18

## 2022-06-18 DIAGNOSIS — I96 GANGRENE: Primary | ICD-10-CM

## 2022-06-18 DIAGNOSIS — I96 GANGRENE: ICD-10-CM

## 2022-06-18 LAB — VANCOMYCIN TROUGH SERPL-MCNC: 18.1 MCG/ML (ref 5–20)

## 2022-06-18 PROCEDURE — 36415 COLL VENOUS BLD VENIPUNCTURE: CPT

## 2022-06-18 PROCEDURE — 80202 ASSAY OF VANCOMYCIN: CPT

## 2022-06-21 NOTE — PROGRESS NOTES
PPS CMG Coordinator  Inpatient Rehabilitation Discharge    Mode of Locomotion: Wheelchair.    Discharge Against Medical Advice:  No.  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9, adult  13   E66.9     Obesity, unspecified  14   D50.9     Iron deficiency anemia, unspecified  15   N50.89    Other specified disorders of the male genital                 organs  16   R60.1     Generalized edema  17   Z79.4     Long term (current) use of insulin    Complications:      SHANNON Bladder Accidents: 0  - Accidents.  Bladder Score = 6. Patient has not had an accident, but uses a  device/medication. urinal  SHANNON Bowel Accident: 0  - Accidents.  Bowel Score = 6. Patient has no accidents, but uses a device/medications. bowel  med    Signed by: Jazmin Mosquera Nurse

## 2022-09-07 ENCOUNTER — TRANSCRIBE ORDERS (OUTPATIENT)
Dept: ADMINISTRATIVE | Facility: HOSPITAL | Age: 57
End: 2022-09-07

## 2022-09-07 DIAGNOSIS — M89.8X1 SHOULDER BLADE PAIN: Primary | ICD-10-CM

## 2022-09-20 ENCOUNTER — HOSPITAL ENCOUNTER (INPATIENT)
Facility: HOSPITAL | Age: 57
LOS: 14 days | Discharge: NURSING, REHAB OR LONG-TERM CARE | End: 2022-10-05
Attending: EMERGENCY MEDICINE | Admitting: INTERNAL MEDICINE

## 2022-09-20 ENCOUNTER — APPOINTMENT (OUTPATIENT)
Dept: GENERAL RADIOLOGY | Facility: HOSPITAL | Age: 57
End: 2022-09-20

## 2022-09-20 DIAGNOSIS — M00.061 STAPHYLOCOCCAL ARTHRITIS OF RIGHT KNEE: ICD-10-CM

## 2022-09-20 DIAGNOSIS — R53.1 GENERALIZED WEAKNESS: ICD-10-CM

## 2022-09-20 DIAGNOSIS — R73.9 HYPERGLYCEMIA: ICD-10-CM

## 2022-09-20 DIAGNOSIS — M00.9 PYOGENIC ARTHRITIS OF RIGHT KNEE JOINT: ICD-10-CM

## 2022-09-20 DIAGNOSIS — M25.561 RIGHT KNEE PAIN, UNSPECIFIED CHRONICITY: ICD-10-CM

## 2022-09-20 DIAGNOSIS — M00.9 PYOGENIC ARTHRITIS OF RIGHT KNEE JOINT, DUE TO UNSPECIFIED ORGANISM: Primary | ICD-10-CM

## 2022-09-20 LAB
ALBUMIN SERPL-MCNC: 2.8 G/DL (ref 3.5–5.2)
ALBUMIN/GLOB SERPL: 0.6 G/DL
ALP SERPL-CCNC: 184 U/L (ref 39–117)
ALT SERPL W P-5'-P-CCNC: 18 U/L (ref 1–41)
ANION GAP SERPL CALCULATED.3IONS-SCNC: 13 MMOL/L (ref 5–15)
AST SERPL-CCNC: 33 U/L (ref 1–40)
BASOPHILS # BLD AUTO: 0.02 10*3/MM3 (ref 0–0.2)
BASOPHILS NFR BLD AUTO: 0.2 % (ref 0–1.5)
BILIRUB SERPL-MCNC: 1 MG/DL (ref 0–1.2)
BUN SERPL-MCNC: 15 MG/DL (ref 6–20)
BUN/CREAT SERPL: 16.1 (ref 7–25)
CALCIUM SPEC-SCNC: 8.4 MG/DL (ref 8.6–10.5)
CHLORIDE SERPL-SCNC: 91 MMOL/L (ref 98–107)
CO2 SERPL-SCNC: 31 MMOL/L (ref 22–29)
CREAT SERPL-MCNC: 0.93 MG/DL (ref 0.76–1.27)
CRP SERPL-MCNC: 21.2 MG/DL (ref 0–0.5)
D-LACTATE SERPL-SCNC: 2.6 MMOL/L (ref 0.5–2)
DEPRECATED RDW RBC AUTO: 45.9 FL (ref 37–54)
EGFRCR SERPLBLD CKD-EPI 2021: 95.8 ML/MIN/1.73
EOSINOPHIL # BLD AUTO: 0.07 10*3/MM3 (ref 0–0.4)
EOSINOPHIL NFR BLD AUTO: 0.8 % (ref 0.3–6.2)
ERYTHROCYTE [DISTWIDTH] IN BLOOD BY AUTOMATED COUNT: 17 % (ref 12.3–15.4)
ERYTHROCYTE [SEDIMENTATION RATE] IN BLOOD: 100 MM/HR (ref 0–20)
GLOBULIN UR ELPH-MCNC: 4.5 GM/DL
GLUCOSE SERPL-MCNC: 358 MG/DL (ref 65–99)
HCT VFR BLD AUTO: 36 % (ref 37.5–51)
HGB BLD-MCNC: 12 G/DL (ref 13–17.7)
HOLD SPECIMEN: NORMAL
IMM GRANULOCYTES # BLD AUTO: 0.03 10*3/MM3 (ref 0–0.05)
IMM GRANULOCYTES NFR BLD AUTO: 0.3 % (ref 0–0.5)
LYMPHOCYTES # BLD AUTO: 1 10*3/MM3 (ref 0.7–3.1)
LYMPHOCYTES NFR BLD AUTO: 11 % (ref 19.6–45.3)
MCH RBC QN AUTO: 25.3 PG (ref 26.6–33)
MCHC RBC AUTO-ENTMCNC: 33.3 G/DL (ref 31.5–35.7)
MCV RBC AUTO: 75.8 FL (ref 79–97)
MONOCYTES # BLD AUTO: 0.53 10*3/MM3 (ref 0.1–0.9)
MONOCYTES NFR BLD AUTO: 5.8 % (ref 5–12)
NEUTROPHILS NFR BLD AUTO: 7.47 10*3/MM3 (ref 1.7–7)
NEUTROPHILS NFR BLD AUTO: 81.9 % (ref 42.7–76)
NRBC BLD AUTO-RTO: 0 /100 WBC (ref 0–0.2)
PLATELET # BLD AUTO: 177 10*3/MM3 (ref 140–450)
PMV BLD AUTO: 9.9 FL (ref 6–12)
POTASSIUM SERPL-SCNC: 3.3 MMOL/L (ref 3.5–5.2)
PROT SERPL-MCNC: 7.3 G/DL (ref 6–8.5)
RBC # BLD AUTO: 4.75 10*6/MM3 (ref 4.14–5.8)
SODIUM SERPL-SCNC: 135 MMOL/L (ref 136–145)
WBC NRBC COR # BLD: 9.12 10*3/MM3 (ref 3.4–10.8)
WHOLE BLOOD HOLD COAG: NORMAL
WHOLE BLOOD HOLD SPECIMEN: NORMAL

## 2022-09-20 PROCEDURE — 87015 SPECIMEN INFECT AGNT CONCNTJ: CPT | Performed by: PHYSICIAN ASSISTANT

## 2022-09-20 PROCEDURE — 25010000002 MORPHINE PER 10 MG: Performed by: EMERGENCY MEDICINE

## 2022-09-20 PROCEDURE — 85652 RBC SED RATE AUTOMATED: CPT | Performed by: PHYSICIAN ASSISTANT

## 2022-09-20 PROCEDURE — 87147 CULTURE TYPE IMMUNOLOGIC: CPT | Performed by: PHYSICIAN ASSISTANT

## 2022-09-20 PROCEDURE — 85025 COMPLETE CBC W/AUTO DIFF WBC: CPT

## 2022-09-20 PROCEDURE — 87205 SMEAR GRAM STAIN: CPT | Performed by: PHYSICIAN ASSISTANT

## 2022-09-20 PROCEDURE — 99284 EMERGENCY DEPT VISIT MOD MDM: CPT

## 2022-09-20 PROCEDURE — 80053 COMPREHEN METABOLIC PANEL: CPT

## 2022-09-20 PROCEDURE — 87040 BLOOD CULTURE FOR BACTERIA: CPT | Performed by: PHYSICIAN ASSISTANT

## 2022-09-20 PROCEDURE — 87150 DNA/RNA AMPLIFIED PROBE: CPT | Performed by: PHYSICIAN ASSISTANT

## 2022-09-20 PROCEDURE — 89051 BODY FLUID CELL COUNT: CPT | Performed by: PHYSICIAN ASSISTANT

## 2022-09-20 PROCEDURE — 86140 C-REACTIVE PROTEIN: CPT | Performed by: PHYSICIAN ASSISTANT

## 2022-09-20 PROCEDURE — 87070 CULTURE OTHR SPECIMN AEROBIC: CPT | Performed by: PHYSICIAN ASSISTANT

## 2022-09-20 PROCEDURE — 84145 PROCALCITONIN (PCT): CPT | Performed by: INTERNAL MEDICINE

## 2022-09-20 PROCEDURE — 87186 SC STD MICRODIL/AGAR DIL: CPT | Performed by: PHYSICIAN ASSISTANT

## 2022-09-20 PROCEDURE — 73560 X-RAY EXAM OF KNEE 1 OR 2: CPT

## 2022-09-20 PROCEDURE — 83036 HEMOGLOBIN GLYCOSYLATED A1C: CPT | Performed by: INTERNAL MEDICINE

## 2022-09-20 PROCEDURE — 83735 ASSAY OF MAGNESIUM: CPT | Performed by: INTERNAL MEDICINE

## 2022-09-20 PROCEDURE — 83605 ASSAY OF LACTIC ACID: CPT | Performed by: PHYSICIAN ASSISTANT

## 2022-09-20 RX ORDER — MORPHINE SULFATE 2 MG/ML
2 INJECTION, SOLUTION INTRAMUSCULAR; INTRAVENOUS ONCE
Status: COMPLETED | OUTPATIENT
Start: 2022-09-20 | End: 2022-09-20

## 2022-09-20 RX ORDER — SODIUM CHLORIDE 0.9 % (FLUSH) 0.9 %
10 SYRINGE (ML) INJECTION AS NEEDED
Status: DISCONTINUED | OUTPATIENT
Start: 2022-09-20 | End: 2022-10-03

## 2022-09-20 RX ADMIN — LIDOCAINE HYDROCHLORIDE 10 ML: 10; .005 INJECTION, SOLUTION EPIDURAL; INFILTRATION; INTRACAUDAL; PERINEURAL at 23:35

## 2022-09-20 RX ADMIN — MORPHINE SULFATE 2 MG: 2 INJECTION, SOLUTION INTRAMUSCULAR; INTRAVENOUS at 22:49

## 2022-09-20 RX ADMIN — SODIUM CHLORIDE 1000 ML: 9 INJECTION, SOLUTION INTRAVENOUS at 22:49

## 2022-09-21 ENCOUNTER — ANESTHESIA EVENT (OUTPATIENT)
Dept: PERIOP | Facility: HOSPITAL | Age: 57
End: 2022-09-21

## 2022-09-21 ENCOUNTER — ANESTHESIA (OUTPATIENT)
Dept: PERIOP | Facility: HOSPITAL | Age: 57
End: 2022-09-21

## 2022-09-21 ENCOUNTER — ANESTHESIA EVENT CONVERTED (OUTPATIENT)
Dept: ANESTHESIOLOGY | Facility: HOSPITAL | Age: 57
End: 2022-09-21

## 2022-09-21 PROBLEM — K74.60 CIRRHOSIS OF LIVER: Status: ACTIVE | Noted: 2022-09-21

## 2022-09-21 PROBLEM — M00.9 PYOGENIC ARTHRITIS OF RIGHT KNEE JOINT, DUE TO UNSPECIFIED ORGANISM: Status: ACTIVE | Noted: 2022-09-21

## 2022-09-21 PROBLEM — Z86.14 MRSA INFECTION GREATER THAN 3 MONTHS AGO: Status: ACTIVE | Noted: 2022-09-21

## 2022-09-21 PROBLEM — M25.461 EFFUSION OF RIGHT KNEE: Status: ACTIVE | Noted: 2022-09-21

## 2022-09-21 LAB
APPEARANCE FLD: ABNORMAL
BACTERIA BLD CULT: ABNORMAL
BACTERIA UR QL AUTO: NORMAL /HPF
BILIRUB UR QL STRIP: NEGATIVE
CLARITY UR: ABNORMAL
COLOR FLD: YELLOW
COLOR UR: YELLOW
CRYSTALS FLD MICRO: NORMAL
D-LACTATE SERPL-SCNC: 1.5 MMOL/L (ref 0.5–2)
D-LACTATE SERPL-SCNC: 2.1 MMOL/L (ref 0.5–2)
GLUCOSE BLDC GLUCOMTR-MCNC: 238 MG/DL (ref 70–130)
GLUCOSE BLDC GLUCOMTR-MCNC: 243 MG/DL (ref 70–130)
GLUCOSE BLDC GLUCOMTR-MCNC: 256 MG/DL (ref 70–130)
GLUCOSE BLDC GLUCOMTR-MCNC: 314 MG/DL (ref 70–130)
GLUCOSE BLDC GLUCOMTR-MCNC: 333 MG/DL (ref 70–130)
GLUCOSE UR STRIP-MCNC: ABNORMAL MG/DL
HBA1C MFR BLD: 8.8 % (ref 4.8–5.6)
HGB UR QL STRIP.AUTO: NEGATIVE
HYALINE CASTS UR QL AUTO: NORMAL /LPF
KETONES UR QL STRIP: NEGATIVE
LEUKOCYTE ESTERASE UR QL STRIP.AUTO: NEGATIVE
LYMPHOCYTES NFR FLD MANUAL: 3 %
MAGNESIUM SERPL-MCNC: 1.5 MG/DL (ref 1.6–2.6)
MONOCYTES NFR FLD: 1 %
NEUTROPHILS NFR FLD MANUAL: 96 %
NITRITE UR QL STRIP: NEGATIVE
PH UR STRIP.AUTO: 6 [PH] (ref 5–8)
POTASSIUM SERPL-SCNC: 2.9 MMOL/L (ref 3.5–5.2)
PROCALCITONIN SERPL-MCNC: 0.22 NG/ML (ref 0–0.25)
PROT UR QL STRIP: ABNORMAL
RBC # FLD AUTO: ABNORMAL /MM3
RBC # UR STRIP: NORMAL /HPF
REF LAB TEST METHOD: NORMAL
SP GR UR STRIP: 1.02 (ref 1–1.03)
SQUAMOUS #/AREA URNS HPF: NORMAL /HPF
UROBILINOGEN UR QL STRIP: ABNORMAL
WBC # FLD AUTO: ABNORMAL /MM3
WBC # UR STRIP: NORMAL /HPF

## 2022-09-21 PROCEDURE — 87116 MYCOBACTERIA CULTURE: CPT | Performed by: ORTHOPAEDIC SURGERY

## 2022-09-21 PROCEDURE — L1830 KO IMMOB CANVAS LONG PRE OTS: HCPCS | Performed by: ORTHOPAEDIC SURGERY

## 2022-09-21 PROCEDURE — 83605 ASSAY OF LACTIC ACID: CPT | Performed by: PHYSICIAN ASSISTANT

## 2022-09-21 PROCEDURE — 82962 GLUCOSE BLOOD TEST: CPT

## 2022-09-21 PROCEDURE — 87075 CULTR BACTERIA EXCEPT BLOOD: CPT | Performed by: ORTHOPAEDIC SURGERY

## 2022-09-21 PROCEDURE — 0 MAGNESIUM SULFATE 4 GM/100ML SOLUTION: Performed by: INTERNAL MEDICINE

## 2022-09-21 PROCEDURE — 25010000002 VANCOMYCIN 10 G RECONSTITUTED SOLUTION: Performed by: INTERNAL MEDICINE

## 2022-09-21 PROCEDURE — 63710000001 INSULIN DETEMIR PER 5 UNITS: Performed by: INTERNAL MEDICINE

## 2022-09-21 PROCEDURE — 63710000001 INSULIN DETEMIR PER 5 UNITS: Performed by: ORTHOPAEDIC SURGERY

## 2022-09-21 PROCEDURE — 25010000002 HYDROMORPHONE 1 MG/ML SOLUTION

## 2022-09-21 PROCEDURE — 25010000002 FENTANYL CITRATE (PF) 50 MCG/ML SOLUTION

## 2022-09-21 PROCEDURE — 25010000002 ONDANSETRON PER 1 MG: Performed by: PHYSICIAN ASSISTANT

## 2022-09-21 PROCEDURE — 63710000001 INSULIN LISPRO (HUMAN) PER 5 UNITS: Performed by: HOSPITALIST

## 2022-09-21 PROCEDURE — 25010000002 CEFAZOLIN IN DEXTROSE 2-4 GM/100ML-% SOLUTION: Performed by: ORTHOPAEDIC SURGERY

## 2022-09-21 PROCEDURE — 87206 SMEAR FLUORESCENT/ACID STAI: CPT | Performed by: ORTHOPAEDIC SURGERY

## 2022-09-21 PROCEDURE — 25010000002 MORPHINE PER 10 MG: Performed by: INTERNAL MEDICINE

## 2022-09-21 PROCEDURE — 87186 SC STD MICRODIL/AGAR DIL: CPT | Performed by: ORTHOPAEDIC SURGERY

## 2022-09-21 PROCEDURE — 25010000002 VANCOMYCIN 10 G RECONSTITUTED SOLUTION: Performed by: ORTHOPAEDIC SURGERY

## 2022-09-21 PROCEDURE — 81001 URINALYSIS AUTO W/SCOPE: CPT | Performed by: EMERGENCY MEDICINE

## 2022-09-21 PROCEDURE — 0 POTASSIUM CHLORIDE 10 MEQ/100ML SOLUTION: Performed by: INTERNAL MEDICINE

## 2022-09-21 PROCEDURE — 87176 TISSUE HOMOGENIZATION CULTR: CPT | Performed by: ORTHOPAEDIC SURGERY

## 2022-09-21 PROCEDURE — 99223 1ST HOSP IP/OBS HIGH 75: CPT | Performed by: INTERNAL MEDICINE

## 2022-09-21 PROCEDURE — 25010000002 PROPOFOL 10 MG/ML EMULSION: Performed by: NURSE ANESTHETIST, CERTIFIED REGISTERED

## 2022-09-21 PROCEDURE — 0QBD0ZZ EXCISION OF RIGHT PATELLA, OPEN APPROACH: ICD-10-PCS | Performed by: ORTHOPAEDIC SURGERY

## 2022-09-21 PROCEDURE — 89060 EXAM SYNOVIAL FLUID CRYSTALS: CPT | Performed by: PHYSICIAN ASSISTANT

## 2022-09-21 PROCEDURE — 87147 CULTURE TYPE IMMUNOLOGIC: CPT | Performed by: ORTHOPAEDIC SURGERY

## 2022-09-21 PROCEDURE — 87070 CULTURE OTHR SPECIMN AEROBIC: CPT | Performed by: ORTHOPAEDIC SURGERY

## 2022-09-21 PROCEDURE — 84132 ASSAY OF SERUM POTASSIUM: CPT | Performed by: INTERNAL MEDICINE

## 2022-09-21 PROCEDURE — 25010000002 FENTANYL CITRATE (PF) 50 MCG/ML SOLUTION: Performed by: NURSE ANESTHETIST, CERTIFIED REGISTERED

## 2022-09-21 PROCEDURE — 25010000002 ROPIVACAINE PER 1 MG: Performed by: NURSE ANESTHETIST, CERTIFIED REGISTERED

## 2022-09-21 PROCEDURE — 87102 FUNGUS ISOLATION CULTURE: CPT | Performed by: ORTHOPAEDIC SURGERY

## 2022-09-21 PROCEDURE — 25010000002 ONDANSETRON PER 1 MG: Performed by: NURSE ANESTHETIST, CERTIFIED REGISTERED

## 2022-09-21 PROCEDURE — 0S9C0ZZ DRAINAGE OF RIGHT KNEE JOINT, OPEN APPROACH: ICD-10-PCS | Performed by: ORTHOPAEDIC SURGERY

## 2022-09-21 PROCEDURE — 87205 SMEAR GRAM STAIN: CPT | Performed by: ORTHOPAEDIC SURGERY

## 2022-09-21 PROCEDURE — 25010000002 CEFTRIAXONE PER 250 MG: Performed by: INTERNAL MEDICINE

## 2022-09-21 DEVICE — DEV CONTRL TISS STRATAFIX SPIRAL PDO BIDIR 1 36X36CM: Type: IMPLANTABLE DEVICE | Site: KNEE | Status: FUNCTIONAL

## 2022-09-21 RX ORDER — CEFAZOLIN SODIUM 2 G/100ML
2 INJECTION, SOLUTION INTRAVENOUS ONCE
Status: COMPLETED | OUTPATIENT
Start: 2022-09-21 | End: 2022-09-21

## 2022-09-21 RX ORDER — ONDANSETRON 4 MG/1
4 TABLET, FILM COATED ORAL EVERY 6 HOURS PRN
Status: DISCONTINUED | OUTPATIENT
Start: 2022-09-21 | End: 2022-10-05 | Stop reason: HOSPADM

## 2022-09-21 RX ORDER — ASPIRIN 81 MG/1
81 TABLET ORAL EVERY 12 HOURS SCHEDULED
Status: DISCONTINUED | OUTPATIENT
Start: 2022-09-22 | End: 2022-10-05 | Stop reason: HOSPADM

## 2022-09-21 RX ORDER — OXYCODONE HYDROCHLORIDE 5 MG/1
5 TABLET ORAL EVERY 4 HOURS PRN
Status: DISPENSED | OUTPATIENT
Start: 2022-09-21 | End: 2022-10-01

## 2022-09-21 RX ORDER — SODIUM CHLORIDE, SODIUM LACTATE, POTASSIUM CHLORIDE, CALCIUM CHLORIDE 600; 310; 30; 20 MG/100ML; MG/100ML; MG/100ML; MG/100ML
100 INJECTION, SOLUTION INTRAVENOUS CONTINUOUS
Status: DISCONTINUED | OUTPATIENT
Start: 2022-09-21 | End: 2022-09-21

## 2022-09-21 RX ORDER — PROPOFOL 10 MG/ML
VIAL (ML) INTRAVENOUS AS NEEDED
Status: DISCONTINUED | OUTPATIENT
Start: 2022-09-21 | End: 2022-09-21 | Stop reason: SURG

## 2022-09-21 RX ORDER — SODIUM CHLORIDE, SODIUM LACTATE, POTASSIUM CHLORIDE, CALCIUM CHLORIDE 600; 310; 30; 20 MG/100ML; MG/100ML; MG/100ML; MG/100ML
100 INJECTION, SOLUTION INTRAVENOUS CONTINUOUS
Status: DISCONTINUED | OUTPATIENT
Start: 2022-09-21 | End: 2022-09-23

## 2022-09-21 RX ORDER — DIPHENOXYLATE HYDROCHLORIDE AND ATROPINE SULFATE 2.5; .025 MG/1; MG/1
1 TABLET ORAL DAILY
Status: DISCONTINUED | OUTPATIENT
Start: 2022-09-21 | End: 2022-10-05 | Stop reason: HOSPADM

## 2022-09-21 RX ORDER — BUPIVACAINE HYDROCHLORIDE 2.5 MG/ML
INJECTION, SOLUTION EPIDURAL; INFILTRATION; INTRACAUDAL
Status: DISPENSED
Start: 2022-09-21 | End: 2022-09-22

## 2022-09-21 RX ORDER — INSULIN LISPRO 100 [IU]/ML
0-7 INJECTION, SOLUTION INTRAVENOUS; SUBCUTANEOUS
Status: DISCONTINUED | OUTPATIENT
Start: 2022-09-21 | End: 2022-09-21

## 2022-09-21 RX ORDER — LIDOCAINE HYDROCHLORIDE 10 MG/ML
INJECTION, SOLUTION EPIDURAL; INFILTRATION; INTRACAUDAL; PERINEURAL AS NEEDED
Status: DISCONTINUED | OUTPATIENT
Start: 2022-09-21 | End: 2022-09-21 | Stop reason: SURG

## 2022-09-21 RX ORDER — SPIRONOLACTONE 25 MG/1
50 TABLET ORAL DAILY
Status: DISCONTINUED | OUTPATIENT
Start: 2022-09-21 | End: 2022-10-03

## 2022-09-21 RX ORDER — ACETAMINOPHEN 325 MG/1
650 TABLET ORAL EVERY 4 HOURS PRN
Status: DISCONTINUED | OUTPATIENT
Start: 2022-09-21 | End: 2022-10-05 | Stop reason: HOSPADM

## 2022-09-21 RX ORDER — HYDROMORPHONE HCL 110MG/55ML
0.5 PATIENT CONTROLLED ANALGESIA SYRINGE INTRAVENOUS
Status: ACTIVE | OUTPATIENT
Start: 2022-09-21 | End: 2022-10-01

## 2022-09-21 RX ORDER — DEXTROSE MONOHYDRATE 25 G/50ML
25 INJECTION, SOLUTION INTRAVENOUS
Status: DISCONTINUED | OUTPATIENT
Start: 2022-09-21 | End: 2022-10-05 | Stop reason: HOSPADM

## 2022-09-21 RX ORDER — ONDANSETRON 2 MG/ML
4 INJECTION INTRAMUSCULAR; INTRAVENOUS EVERY 6 HOURS PRN
Status: DISCONTINUED | OUTPATIENT
Start: 2022-09-21 | End: 2022-09-21 | Stop reason: SDUPTHER

## 2022-09-21 RX ORDER — NALOXONE HCL 0.4 MG/ML
0.1 VIAL (ML) INJECTION
Status: DISCONTINUED | OUTPATIENT
Start: 2022-09-21 | End: 2022-10-05 | Stop reason: HOSPADM

## 2022-09-21 RX ORDER — ONDANSETRON 2 MG/ML
INJECTION INTRAMUSCULAR; INTRAVENOUS AS NEEDED
Status: DISCONTINUED | OUTPATIENT
Start: 2022-09-21 | End: 2022-09-21 | Stop reason: SURG

## 2022-09-21 RX ORDER — MORPHINE SULFATE 2 MG/ML
2 INJECTION, SOLUTION INTRAMUSCULAR; INTRAVENOUS
Status: DISPENSED | OUTPATIENT
Start: 2022-09-21 | End: 2022-10-01

## 2022-09-21 RX ORDER — ACETAMINOPHEN 500 MG
1000 TABLET ORAL EVERY 8 HOURS
Status: DISCONTINUED | OUTPATIENT
Start: 2022-09-21 | End: 2022-10-05 | Stop reason: HOSPADM

## 2022-09-21 RX ORDER — POTASSIUM CHLORIDE 7.45 MG/ML
10 INJECTION INTRAVENOUS
Status: COMPLETED | OUTPATIENT
Start: 2022-09-21 | End: 2022-09-21

## 2022-09-21 RX ORDER — SODIUM CHLORIDE 0.9 % (FLUSH) 0.9 %
10 SYRINGE (ML) INJECTION AS NEEDED
Status: DISCONTINUED | OUTPATIENT
Start: 2022-09-21 | End: 2022-10-05 | Stop reason: HOSPADM

## 2022-09-21 RX ORDER — TRAMADOL HYDROCHLORIDE 50 MG/1
50 TABLET ORAL EVERY 8 HOURS PRN
Status: DISPENSED | OUTPATIENT
Start: 2022-09-21 | End: 2022-09-28

## 2022-09-21 RX ORDER — ACETAMINOPHEN 160 MG/5ML
650 SOLUTION ORAL EVERY 4 HOURS PRN
Status: DISCONTINUED | OUTPATIENT
Start: 2022-09-21 | End: 2022-10-05 | Stop reason: HOSPADM

## 2022-09-21 RX ORDER — INSULIN LISPRO 100 [IU]/ML
0-9 INJECTION, SOLUTION INTRAVENOUS; SUBCUTANEOUS
Status: DISCONTINUED | OUTPATIENT
Start: 2022-09-21 | End: 2022-09-21 | Stop reason: HOSPADM

## 2022-09-21 RX ORDER — MELOXICAM 15 MG/1
15 TABLET ORAL DAILY
Status: DISCONTINUED | OUTPATIENT
Start: 2022-09-21 | End: 2022-09-26

## 2022-09-21 RX ORDER — NADOLOL 20 MG/1
20 TABLET ORAL
Status: DISCONTINUED | OUTPATIENT
Start: 2022-09-21 | End: 2022-10-04

## 2022-09-21 RX ORDER — INSULIN LISPRO 100 [IU]/ML
0-9 INJECTION, SOLUTION INTRAVENOUS; SUBCUTANEOUS
Status: DISCONTINUED | OUTPATIENT
Start: 2022-09-21 | End: 2022-10-05 | Stop reason: HOSPADM

## 2022-09-21 RX ORDER — TRANEXAMIC ACID 10 MG/ML
1000 INJECTION, SOLUTION INTRAVENOUS ONCE
Status: COMPLETED | OUTPATIENT
Start: 2022-09-21 | End: 2022-09-21

## 2022-09-21 RX ORDER — ONDANSETRON 2 MG/ML
4 INJECTION INTRAMUSCULAR; INTRAVENOUS ONCE
Status: COMPLETED | OUTPATIENT
Start: 2022-09-21 | End: 2022-09-21

## 2022-09-21 RX ORDER — FENTANYL CITRATE 50 UG/ML
50 INJECTION, SOLUTION INTRAMUSCULAR; INTRAVENOUS
Status: DISCONTINUED | OUTPATIENT
Start: 2022-09-21 | End: 2022-09-21 | Stop reason: HOSPADM

## 2022-09-21 RX ORDER — FENTANYL CITRATE 50 UG/ML
INJECTION, SOLUTION INTRAMUSCULAR; INTRAVENOUS
Status: COMPLETED
Start: 2022-09-21 | End: 2022-09-21

## 2022-09-21 RX ORDER — NICOTINE POLACRILEX 4 MG
15 LOZENGE BUCCAL
Status: DISCONTINUED | OUTPATIENT
Start: 2022-09-21 | End: 2022-10-05 | Stop reason: HOSPADM

## 2022-09-21 RX ORDER — SODIUM CHLORIDE 0.9 % (FLUSH) 0.9 %
10 SYRINGE (ML) INJECTION EVERY 12 HOURS SCHEDULED
Status: DISCONTINUED | OUTPATIENT
Start: 2022-09-21 | End: 2022-10-05 | Stop reason: HOSPADM

## 2022-09-21 RX ORDER — OXYCODONE HYDROCHLORIDE 5 MG/1
10 TABLET ORAL EVERY 4 HOURS PRN
Status: DISPENSED | OUTPATIENT
Start: 2022-09-21 | End: 2022-10-01

## 2022-09-21 RX ORDER — MAGNESIUM SULFATE HEPTAHYDRATE 40 MG/ML
4 INJECTION, SOLUTION INTRAVENOUS ONCE
Status: COMPLETED | OUTPATIENT
Start: 2022-09-21 | End: 2022-09-21

## 2022-09-21 RX ORDER — ACETAMINOPHEN 650 MG/1
650 SUPPOSITORY RECTAL EVERY 4 HOURS PRN
Status: DISCONTINUED | OUTPATIENT
Start: 2022-09-21 | End: 2022-10-05 | Stop reason: HOSPADM

## 2022-09-21 RX ORDER — SODIUM CHLORIDE 9 MG/ML
100 INJECTION, SOLUTION INTRAVENOUS CONTINUOUS
Status: ACTIVE | OUTPATIENT
Start: 2022-09-21 | End: 2022-09-22

## 2022-09-21 RX ORDER — KETOROLAC TROMETHAMINE 30 MG/ML
15 INJECTION, SOLUTION INTRAMUSCULAR; INTRAVENOUS EVERY 6 HOURS PRN
Status: DISPENSED | OUTPATIENT
Start: 2022-09-21 | End: 2022-09-25

## 2022-09-21 RX ORDER — SODIUM CHLORIDE, SODIUM LACTATE, POTASSIUM CHLORIDE, CALCIUM CHLORIDE 600; 310; 30; 20 MG/100ML; MG/100ML; MG/100ML; MG/100ML
9 INJECTION, SOLUTION INTRAVENOUS ONCE
Status: COMPLETED | OUTPATIENT
Start: 2022-09-21 | End: 2022-09-21

## 2022-09-21 RX ORDER — BUPIVACAINE HYDROCHLORIDE 2.5 MG/ML
INJECTION, SOLUTION EPIDURAL; INFILTRATION; INTRACAUDAL
Status: COMPLETED | OUTPATIENT
Start: 2022-09-21 | End: 2022-09-21

## 2022-09-21 RX ORDER — FENTANYL CITRATE 50 UG/ML
INJECTION, SOLUTION INTRAMUSCULAR; INTRAVENOUS AS NEEDED
Status: DISCONTINUED | OUTPATIENT
Start: 2022-09-21 | End: 2022-09-21 | Stop reason: SURG

## 2022-09-21 RX ORDER — ROPIVACAINE HYDROCHLORIDE 2 MG/ML
INJECTION, SOLUTION EPIDURAL; INFILTRATION; PERINEURAL CONTINUOUS
Status: DISCONTINUED | OUTPATIENT
Start: 2022-09-21 | End: 2022-10-03

## 2022-09-21 RX ORDER — NALOXONE HCL 0.4 MG/ML
0.4 VIAL (ML) INJECTION AS NEEDED
Status: DISCONTINUED | OUTPATIENT
Start: 2022-09-21 | End: 2022-10-05 | Stop reason: HOSPADM

## 2022-09-21 RX ORDER — INSULIN LISPRO 100 [IU]/ML
5 INJECTION, SOLUTION INTRAVENOUS; SUBCUTANEOUS
Status: DISCONTINUED | OUTPATIENT
Start: 2022-09-21 | End: 2022-09-22

## 2022-09-21 RX ORDER — DIPHENHYDRAMINE HYDROCHLORIDE 50 MG/ML
25 INJECTION INTRAMUSCULAR; INTRAVENOUS EVERY 6 HOURS PRN
Status: DISCONTINUED | OUTPATIENT
Start: 2022-09-21 | End: 2022-10-05 | Stop reason: HOSPADM

## 2022-09-21 RX ORDER — HYDROMORPHONE HYDROCHLORIDE 1 MG/ML
0.5 INJECTION, SOLUTION INTRAMUSCULAR; INTRAVENOUS; SUBCUTANEOUS
Status: DISCONTINUED | OUTPATIENT
Start: 2022-09-21 | End: 2022-09-21 | Stop reason: HOSPADM

## 2022-09-21 RX ORDER — MAGNESIUM HYDROXIDE 1200 MG/15ML
LIQUID ORAL AS NEEDED
Status: DISCONTINUED | OUTPATIENT
Start: 2022-09-21 | End: 2022-09-21 | Stop reason: HOSPADM

## 2022-09-21 RX ORDER — SODIUM CHLORIDE, SODIUM LACTATE, POTASSIUM CHLORIDE, CALCIUM CHLORIDE 600; 310; 30; 20 MG/100ML; MG/100ML; MG/100ML; MG/100ML
INJECTION, SOLUTION INTRAVENOUS CONTINUOUS PRN
Status: DISCONTINUED | OUTPATIENT
Start: 2022-09-21 | End: 2022-09-21 | Stop reason: SURG

## 2022-09-21 RX ORDER — ONDANSETRON 2 MG/ML
4 INJECTION INTRAMUSCULAR; INTRAVENOUS EVERY 6 HOURS PRN
Status: DISCONTINUED | OUTPATIENT
Start: 2022-09-21 | End: 2022-10-05 | Stop reason: HOSPADM

## 2022-09-21 RX ORDER — DIPHENHYDRAMINE HCL 25 MG
25 CAPSULE ORAL EVERY 6 HOURS PRN
Status: DISCONTINUED | OUTPATIENT
Start: 2022-09-21 | End: 2022-10-05 | Stop reason: HOSPADM

## 2022-09-21 RX ORDER — FUROSEMIDE 40 MG/1
40 TABLET ORAL
Status: DISCONTINUED | OUTPATIENT
Start: 2022-09-21 | End: 2022-10-02

## 2022-09-21 RX ORDER — FAMOTIDINE 10 MG/ML
20 INJECTION, SOLUTION INTRAVENOUS EVERY 12 HOURS SCHEDULED
Status: DISCONTINUED | OUTPATIENT
Start: 2022-09-21 | End: 2022-09-21 | Stop reason: HOSPADM

## 2022-09-21 RX ADMIN — INSULIN LISPRO 5 UNITS: 100 INJECTION, SOLUTION INTRAVENOUS; SUBCUTANEOUS at 18:34

## 2022-09-21 RX ADMIN — MORPHINE SULFATE 2 MG: 2 INJECTION, SOLUTION INTRAMUSCULAR; INTRAVENOUS at 12:21

## 2022-09-21 RX ADMIN — SODIUM CHLORIDE 2 G: 900 INJECTION INTRAVENOUS at 05:53

## 2022-09-21 RX ADMIN — MORPHINE SULFATE 2 MG: 2 INJECTION, SOLUTION INTRAMUSCULAR; INTRAVENOUS at 02:17

## 2022-09-21 RX ADMIN — VANCOMYCIN HYDROCHLORIDE 1250 MG: 10 INJECTION, POWDER, LYOPHILIZED, FOR SOLUTION INTRAVENOUS at 18:22

## 2022-09-21 RX ADMIN — TRANEXAMIC ACID 1000 MG: 10 INJECTION, SOLUTION INTRAVENOUS at 15:21

## 2022-09-21 RX ADMIN — MORPHINE SULFATE 2 MG: 2 INJECTION, SOLUTION INTRAMUSCULAR; INTRAVENOUS at 06:00

## 2022-09-21 RX ADMIN — SODIUM CHLORIDE 1000 ML: 9 INJECTION, SOLUTION INTRAVENOUS at 02:39

## 2022-09-21 RX ADMIN — INSULIN DETEMIR 10 UNITS: 100 INJECTION, SOLUTION SUBCUTANEOUS at 20:34

## 2022-09-21 RX ADMIN — SODIUM CHLORIDE, POTASSIUM CHLORIDE, SODIUM LACTATE AND CALCIUM CHLORIDE 9 ML/HR: 600; 310; 30; 20 INJECTION, SOLUTION INTRAVENOUS at 14:36

## 2022-09-21 RX ADMIN — INSULIN LISPRO 3 UNITS: 100 INJECTION, SOLUTION INTRAVENOUS; SUBCUTANEOUS at 18:35

## 2022-09-21 RX ADMIN — BUPIVACAINE HYDROCHLORIDE 30 ML: 2.5 INJECTION, SOLUTION EPIDURAL; INFILTRATION; INTRACAUDAL; PERINEURAL at 16:30

## 2022-09-21 RX ADMIN — SODIUM CHLORIDE 100 ML/HR: 9 INJECTION, SOLUTION INTRAVENOUS at 03:52

## 2022-09-21 RX ADMIN — FENTANYL CITRATE 50 MCG: 50 INJECTION, SOLUTION INTRAMUSCULAR; INTRAVENOUS at 17:05

## 2022-09-21 RX ADMIN — CEFAZOLIN SODIUM 2 G: 2 INJECTION, SOLUTION INTRAVENOUS at 15:12

## 2022-09-21 RX ADMIN — VANCOMYCIN HYDROCHLORIDE 2000 MG: 10 INJECTION, POWDER, LYOPHILIZED, FOR SOLUTION INTRAVENOUS at 03:54

## 2022-09-21 RX ADMIN — HYDROMORPHONE HYDROCHLORIDE 0.5 MG: 1 INJECTION, SOLUTION INTRAMUSCULAR; INTRAVENOUS; SUBCUTANEOUS at 16:48

## 2022-09-21 RX ADMIN — FENTANYL CITRATE 100 MCG: 50 INJECTION, SOLUTION INTRAMUSCULAR; INTRAVENOUS at 15:16

## 2022-09-21 RX ADMIN — NADOLOL 20 MG: 20 TABLET ORAL at 10:21

## 2022-09-21 RX ADMIN — MAGNESIUM SULFATE HEPTAHYDRATE 4 G: 40 INJECTION, SOLUTION INTRAVENOUS at 05:55

## 2022-09-21 RX ADMIN — FUROSEMIDE 40 MG: 40 TABLET ORAL at 10:21

## 2022-09-21 RX ADMIN — ONDANSETRON 4 MG: 2 INJECTION INTRAMUSCULAR; INTRAVENOUS at 02:16

## 2022-09-21 RX ADMIN — POTASSIUM CHLORIDE 10 MEQ: 7.46 INJECTION, SOLUTION INTRAVENOUS at 03:54

## 2022-09-21 RX ADMIN — LIDOCAINE HYDROCHLORIDE 50 MG: 10 INJECTION, SOLUTION EPIDURAL; INFILTRATION; INTRACAUDAL; PERINEURAL at 15:16

## 2022-09-21 RX ADMIN — MULTIVITAMIN TABLET 1 TABLET: TABLET at 10:21

## 2022-09-21 RX ADMIN — POTASSIUM CHLORIDE 10 MEQ: 7.46 INJECTION, SOLUTION INTRAVENOUS at 02:41

## 2022-09-21 RX ADMIN — Medication 1000 MG: at 16:54

## 2022-09-21 RX ADMIN — INSULIN DETEMIR 10 UNITS: 100 INJECTION, SOLUTION SUBCUTANEOUS at 02:11

## 2022-09-21 RX ADMIN — PROPOFOL 200 MG: 10 INJECTION, EMULSION INTRAVENOUS at 15:16

## 2022-09-21 RX ADMIN — FUROSEMIDE 40 MG: 40 TABLET ORAL at 18:22

## 2022-09-21 RX ADMIN — TRANEXAMIC ACID 1000 MG: 10 INJECTION, SOLUTION INTRAVENOUS at 15:48

## 2022-09-21 RX ADMIN — ONDANSETRON 4 MG: 2 INJECTION INTRAMUSCULAR; INTRAVENOUS at 15:50

## 2022-09-21 RX ADMIN — SODIUM CHLORIDE, POTASSIUM CHLORIDE, SODIUM LACTATE AND CALCIUM CHLORIDE 100 ML/HR: 600; 310; 30; 20 INJECTION, SOLUTION INTRAVENOUS at 18:33

## 2022-09-21 RX ADMIN — FAMOTIDINE 20 MG: 10 INJECTION INTRAVENOUS at 14:36

## 2022-09-21 RX ADMIN — ACETAMINOPHEN 1000 MG: 500 TABLET, FILM COATED ORAL at 18:22

## 2022-09-21 RX ADMIN — SPIRONOLACTONE 50 MG: 50 TABLET ORAL at 10:21

## 2022-09-21 RX ADMIN — SODIUM CHLORIDE, POTASSIUM CHLORIDE, SODIUM LACTATE AND CALCIUM CHLORIDE: 600; 310; 30; 20 INJECTION, SOLUTION INTRAVENOUS at 15:07

## 2022-09-21 NOTE — ANESTHESIA PREPROCEDURE EVALUATION
Anesthesia Evaluation     Patient summary reviewed and Nursing notes reviewed   no history of anesthetic complications:  NPO Solid Status: > 8 hours  NPO Liquid Status: > 2 hours           Airway   Mallampati: II  TM distance: >3 FB  Neck ROM: full  No difficulty expected  Dental    (+) poor dentition    Pulmonary - negative pulmonary ROS and normal exam   (-) not a smoker  Cardiovascular - normal exam    ECG reviewed    (+) hypertension, hyperlipidemia,   (-) CHF    ROS comment: ECHO from 5/2022:  Interpretation Summary    · Normal left ventricular cavity size and wall thickness noted. All left ventricular wall segments contract normally.  · Left ventricular ejection fraction appears to be 61 - 65%.  · The mitral valve is structurally normal with no significant stenosis present. Trace mitral valve regurgitation is present.  · The aortic valve is structurally normal with no regurgitation or stenosis present.  · There is no evidence of pericardial effusion.        Neuro/Psych  (-) seizures, CVA  GI/Hepatic/Renal/Endo    (+) obesity,   renal disease CRI, diabetes mellitus,     Musculoskeletal     Abdominal    Substance History      OB/GYN          Other   arthritis,                    Anesthesia Plan    ASA 3     general with block     (Post-op femoral nerve catheter)  intravenous induction     Anesthetic plan, risks, benefits, and alternatives have been provided, discussed and informed consent has been obtained with: patient.    Use of blood products discussed with patient  Consented to blood products.   Plan discussed with CRNA.        CODE STATUS:    Code Status (Patient has no pulse and is not breathing): CPR (Attempt to Resuscitate)  Medical Interventions (Patient has pulse or is breathing): Full Support

## 2022-09-21 NOTE — ANESTHESIA PROCEDURE NOTES
Airway  Urgency: elective    Date/Time: 9/21/2022 3:16 PM  Airway not difficult    General Information and Staff    Patient location during procedure: OR  CRNA/CAA: Jaclyn Mar CRNA    Indications and Patient Condition  Indications for airway management: airway protection    Preoxygenated: yes  MILS maintained throughout  Mask difficulty assessment: 1 - vent by mask    Final Airway Details  Final airway type: supraglottic airway      Successful airway: I-gel  Size 5    Number of attempts at approach: 1  Assessment: lips, teeth, and gum same as pre-op and atraumatic intubation    Additional Comments  Pt to OR 13. Pt moved self to OR table. ASA monitors applied. Pre-O2 with 100%. SIVI. LMA placed atraumatic. +ETCO2. +BBS.

## 2022-09-21 NOTE — ANESTHESIA PROCEDURE NOTES
Right Femoral Catheter Block      Patient reassessed immediately prior to procedure    Patient location during procedure: post-op  Reason for block: at surgeon's request and post-op pain management  Performed by  CRNA/CAA: Jaclyn Mar CRNA  Assisted by: Modesta Phan SRNA  Preanesthetic Checklist  Completed: patient identified, IV checked, site marked, risks and benefits discussed, surgical consent, monitors and equipment checked, pre-op evaluation and timeout performed  Prep:  Pt Position: supine  Sterile barriers:gloves, cap, sterile barriers and mask  Prep: ChloraPrep  Patient monitoring: blood pressure monitoring, continuous pulse oximetry and EKG  Procedure  Performed under: local infiltration  Guidance:ultrasound guided, nerve stimulator and landmark technique  Images:still images not obtained    Laterality:right  Block Type:femoral  Injection Technique:catheter  Needle Type:echogenic and Tuohy  Needle Gauge:18 G  Resistance on Injection: none  Catheter Size:20 G  Cath Depth at skin: 7 cm    Medications Used: bupivacaine PF (MARCAINE) 0.25 % injection, 30 mL  Med administered at 9/21/2022 4:30 PM      Post Assessment  Injection Assessment: negative aspiration for heme, no paresthesia on injection and incremental injection  Patient Tolerance:comfortable throughout block  Complications:no  Additional Notes  The pt was placed in the supine position.  The insertion site was  prepped and draped in sterile fashion.  Skin and cutaneous tissue was infiltrated and anesthetized with 1% Lidocaine 3 mls via a 25g needle.  A BBraun 4 inch 18g echogenic needle was inserted in plane, in a lateral to medial direction at the level of the inguinal crease.  Under ultrasound guidance, the femoral artery and vein where located.  The needle was then directed below Fascia Iliacus towards the Femoral nerve.  NS was utilized to hydro dissect and fany needle advancement towards the target structure. LA was injected  incrementally in 3-5 ml with negative aspirate.  LA spread was visualized around the nerve, negative intraneural injection, low injection pressures, and no vascular injection.  LA dose was injected thru the needle(see dose above). A BBraun 20g wire stylet catheter was placed via the needle with ultrasound visualization and confirmation with NS fluid bolus. The labeled Catheter was then secured to skin at insertion site with skin afix and steristrips to curled catheter and CHG transparent dressing.  Thank you.

## 2022-09-21 NOTE — ANESTHESIA POSTPROCEDURE EVALUATION
Patient: Melchor Hardwick III    Procedure Summary     Date: 09/21/22 Room / [facility]:  [facility]  [facility]    Anesthesia Start: 1507 Anesthesia Stop:     Procedure: KNEE INCISION AND DRAINAGE (Right Knee) Diagnosis:       Infection of right knee (HCC)      (Infection of right knee (HCC) [[id number]])    Surgeons: Brain Bentley MD Provider: Brian Boswell Jr., MD    Anesthesia Type: general with block ASA Status: 3          Anesthesia Type: general with block    Vitals  Vitals Value Taken Time   /69 09/21/22 1644   Temp 97.5 °F (36.4 °C) 09/21/22 1644   Pulse 66 09/21/22 1644   Resp 24 09/21/22 1644   SpO2 96 % 09/21/22 1644           Post Anesthesia Care and Evaluation    Patient location during evaluation: PACU  Patient participation: complete - patient participated  Level of consciousness: awake and alert  Pain score: 0  Pain management: adequate    Airway patency: patent  Anesthetic complications: No anesthetic complications  PONV Status: none  Cardiovascular status: hemodynamically stable and acceptable  Respiratory status: nonlabored ventilation, acceptable and nasal cannula  Hydration status: acceptable    Comments: Pt transferred to PACU with O2. Vital signs stable. Report to PACU RN and care accepted.

## 2022-09-22 LAB
ANION GAP SERPL CALCULATED.3IONS-SCNC: 8 MMOL/L (ref 5–15)
BASOPHILS # BLD AUTO: 0.01 10*3/MM3 (ref 0–0.2)
BASOPHILS NFR BLD AUTO: 0.1 % (ref 0–1.5)
BUN SERPL-MCNC: 10 MG/DL (ref 6–20)
BUN/CREAT SERPL: 16.7 (ref 7–25)
CALCIUM SPEC-SCNC: 7.5 MG/DL (ref 8.6–10.5)
CHLORIDE SERPL-SCNC: 94 MMOL/L (ref 98–107)
CK SERPL-CCNC: 47 U/L (ref 20–200)
CO2 SERPL-SCNC: 32 MMOL/L (ref 22–29)
CREAT SERPL-MCNC: 0.6 MG/DL (ref 0.76–1.27)
DEPRECATED RDW RBC AUTO: 46.6 FL (ref 37–54)
EGFRCR SERPLBLD CKD-EPI 2021: 112.6 ML/MIN/1.73
EOSINOPHIL # BLD AUTO: 0.06 10*3/MM3 (ref 0–0.4)
EOSINOPHIL NFR BLD AUTO: 0.8 % (ref 0.3–6.2)
ERYTHROCYTE [DISTWIDTH] IN BLOOD BY AUTOMATED COUNT: 16.8 % (ref 12.3–15.4)
GLUCOSE BLDC GLUCOMTR-MCNC: 265 MG/DL (ref 70–130)
GLUCOSE BLDC GLUCOMTR-MCNC: 284 MG/DL (ref 70–130)
GLUCOSE BLDC GLUCOMTR-MCNC: 323 MG/DL (ref 70–130)
GLUCOSE BLDC GLUCOMTR-MCNC: 324 MG/DL (ref 70–130)
GLUCOSE SERPL-MCNC: 309 MG/DL (ref 65–99)
HCT VFR BLD AUTO: 25.9 % (ref 37.5–51)
HGB BLD-MCNC: 8.4 G/DL (ref 13–17.7)
IMM GRANULOCYTES # BLD AUTO: 0.03 10*3/MM3 (ref 0–0.05)
IMM GRANULOCYTES NFR BLD AUTO: 0.4 % (ref 0–0.5)
LYMPHOCYTES # BLD AUTO: 0.93 10*3/MM3 (ref 0.7–3.1)
LYMPHOCYTES NFR BLD AUTO: 12.9 % (ref 19.6–45.3)
MCH RBC QN AUTO: 24.7 PG (ref 26.6–33)
MCHC RBC AUTO-ENTMCNC: 32.4 G/DL (ref 31.5–35.7)
MCV RBC AUTO: 76.2 FL (ref 79–97)
MONOCYTES # BLD AUTO: 0.61 10*3/MM3 (ref 0.1–0.9)
MONOCYTES NFR BLD AUTO: 8.5 % (ref 5–12)
NEUTROPHILS NFR BLD AUTO: 5.57 10*3/MM3 (ref 1.7–7)
NEUTROPHILS NFR BLD AUTO: 77.3 % (ref 42.7–76)
NRBC BLD AUTO-RTO: 0 /100 WBC (ref 0–0.2)
PLATELET # BLD AUTO: 114 10*3/MM3 (ref 140–450)
PMV BLD AUTO: 10.2 FL (ref 6–12)
POTASSIUM SERPL-SCNC: 2.8 MMOL/L (ref 3.5–5.2)
RBC # BLD AUTO: 3.4 10*6/MM3 (ref 4.14–5.8)
SODIUM SERPL-SCNC: 134 MMOL/L (ref 136–145)
VANCOMYCIN SERPL-MCNC: 10.2 MCG/ML (ref 5–40)
WBC NRBC COR # BLD: 7.21 10*3/MM3 (ref 3.4–10.8)

## 2022-09-22 PROCEDURE — 97110 THERAPEUTIC EXERCISES: CPT

## 2022-09-22 PROCEDURE — 25010000002 ONDANSETRON PER 1 MG: Performed by: ORTHOPAEDIC SURGERY

## 2022-09-22 PROCEDURE — 63710000001 INSULIN DETEMIR PER 5 UNITS: Performed by: HOSPITALIST

## 2022-09-22 PROCEDURE — 25010000002 CEFTRIAXONE PER 250 MG: Performed by: ORTHOPAEDIC SURGERY

## 2022-09-22 PROCEDURE — 63710000001 INSULIN LISPRO (HUMAN) PER 5 UNITS: Performed by: ORTHOPAEDIC SURGERY

## 2022-09-22 PROCEDURE — 80048 BASIC METABOLIC PNL TOTAL CA: CPT | Performed by: ORTHOPAEDIC SURGERY

## 2022-09-22 PROCEDURE — 80202 ASSAY OF VANCOMYCIN: CPT | Performed by: ORTHOPAEDIC SURGERY

## 2022-09-22 PROCEDURE — 25010000002 VANCOMYCIN 10 G RECONSTITUTED SOLUTION: Performed by: ORTHOPAEDIC SURGERY

## 2022-09-22 PROCEDURE — 25010000002 DAPTOMYCIN PER 1 MG: Performed by: INTERNAL MEDICINE

## 2022-09-22 PROCEDURE — 25010000002 KETOROLAC TROMETHAMINE PER 15 MG: Performed by: ORTHOPAEDIC SURGERY

## 2022-09-22 PROCEDURE — 85025 COMPLETE CBC W/AUTO DIFF WBC: CPT | Performed by: ORTHOPAEDIC SURGERY

## 2022-09-22 PROCEDURE — 99233 SBSQ HOSP IP/OBS HIGH 50: CPT | Performed by: HOSPITALIST

## 2022-09-22 PROCEDURE — 82962 GLUCOSE BLOOD TEST: CPT

## 2022-09-22 PROCEDURE — 82550 ASSAY OF CK (CPK): CPT | Performed by: INTERNAL MEDICINE

## 2022-09-22 PROCEDURE — 97162 PT EVAL MOD COMPLEX 30 MIN: CPT

## 2022-09-22 PROCEDURE — 63710000001 INSULIN DETEMIR PER 5 UNITS: Performed by: ORTHOPAEDIC SURGERY

## 2022-09-22 PROCEDURE — 63710000001 INSULIN LISPRO (HUMAN) PER 5 UNITS: Performed by: HOSPITALIST

## 2022-09-22 RX ORDER — POLYETHYLENE GLYCOL 3350 17 G/17G
17 POWDER, FOR SOLUTION ORAL DAILY PRN
Status: DISCONTINUED | OUTPATIENT
Start: 2022-09-22 | End: 2022-10-05 | Stop reason: HOSPADM

## 2022-09-22 RX ORDER — BISACODYL 5 MG/1
5 TABLET, DELAYED RELEASE ORAL DAILY PRN
Status: DISCONTINUED | OUTPATIENT
Start: 2022-09-22 | End: 2022-10-05 | Stop reason: HOSPADM

## 2022-09-22 RX ORDER — MAGNESIUM SULFATE HEPTAHYDRATE 40 MG/ML
2 INJECTION, SOLUTION INTRAVENOUS AS NEEDED
Status: DISCONTINUED | OUTPATIENT
Start: 2022-09-22 | End: 2022-10-05 | Stop reason: HOSPADM

## 2022-09-22 RX ORDER — POTASSIUM CHLORIDE 7.45 MG/ML
10 INJECTION INTRAVENOUS
Status: DISCONTINUED | OUTPATIENT
Start: 2022-09-22 | End: 2022-10-05 | Stop reason: HOSPADM

## 2022-09-22 RX ORDER — AMOXICILLIN 250 MG
2 CAPSULE ORAL 2 TIMES DAILY
Status: DISCONTINUED | OUTPATIENT
Start: 2022-09-22 | End: 2022-10-05 | Stop reason: HOSPADM

## 2022-09-22 RX ORDER — POTASSIUM CHLORIDE 750 MG/1
40 CAPSULE, EXTENDED RELEASE ORAL AS NEEDED
Status: DISCONTINUED | OUTPATIENT
Start: 2022-09-22 | End: 2022-10-05 | Stop reason: HOSPADM

## 2022-09-22 RX ORDER — BISACODYL 10 MG
10 SUPPOSITORY, RECTAL RECTAL DAILY PRN
Status: DISCONTINUED | OUTPATIENT
Start: 2022-09-22 | End: 2022-10-05 | Stop reason: HOSPADM

## 2022-09-22 RX ORDER — POTASSIUM CHLORIDE 1.5 G/1.77G
40 POWDER, FOR SOLUTION ORAL AS NEEDED
Status: DISCONTINUED | OUTPATIENT
Start: 2022-09-22 | End: 2022-10-05 | Stop reason: HOSPADM

## 2022-09-22 RX ORDER — MAGNESIUM SULFATE HEPTAHYDRATE 40 MG/ML
4 INJECTION, SOLUTION INTRAVENOUS AS NEEDED
Status: DISCONTINUED | OUTPATIENT
Start: 2022-09-22 | End: 2022-10-05 | Stop reason: HOSPADM

## 2022-09-22 RX ORDER — INSULIN LISPRO 100 [IU]/ML
8 INJECTION, SOLUTION INTRAVENOUS; SUBCUTANEOUS
Status: DISCONTINUED | OUTPATIENT
Start: 2022-09-22 | End: 2022-09-23

## 2022-09-22 RX ADMIN — ACETAMINOPHEN 1000 MG: 500 TABLET, FILM COATED ORAL at 10:45

## 2022-09-22 RX ADMIN — KETOROLAC TROMETHAMINE 15 MG: 30 INJECTION, SOLUTION INTRAMUSCULAR; INTRAVENOUS at 18:43

## 2022-09-22 RX ADMIN — SODIUM CHLORIDE 2 G: 900 INJECTION INTRAVENOUS at 03:10

## 2022-09-22 RX ADMIN — INSULIN LISPRO 8 UNITS: 100 INJECTION, SOLUTION INTRAVENOUS; SUBCUTANEOUS at 17:13

## 2022-09-22 RX ADMIN — VANCOMYCIN HYDROCHLORIDE 1250 MG: 10 INJECTION, POWDER, LYOPHILIZED, FOR SOLUTION INTRAVENOUS at 07:28

## 2022-09-22 RX ADMIN — MULTIVITAMIN TABLET 1 TABLET: TABLET at 08:25

## 2022-09-22 RX ADMIN — NADOLOL 20 MG: 20 TABLET ORAL at 09:42

## 2022-09-22 RX ADMIN — POTASSIUM CHLORIDE 40 MEQ: 750 CAPSULE, EXTENDED RELEASE ORAL at 17:18

## 2022-09-22 RX ADMIN — FUROSEMIDE 40 MG: 40 TABLET ORAL at 08:25

## 2022-09-22 RX ADMIN — ACETAMINOPHEN 1000 MG: 500 TABLET, FILM COATED ORAL at 03:11

## 2022-09-22 RX ADMIN — ONDANSETRON 4 MG: 2 INJECTION INTRAMUSCULAR; INTRAVENOUS at 12:59

## 2022-09-22 RX ADMIN — DAPTOMYCIN 650 MG: 500 INJECTION, POWDER, LYOPHILIZED, FOR SOLUTION INTRAVENOUS at 12:04

## 2022-09-22 RX ADMIN — INSULIN DETEMIR 10 UNITS: 100 INJECTION, SOLUTION SUBCUTANEOUS at 08:26

## 2022-09-22 RX ADMIN — INSULIN DETEMIR 15 UNITS: 100 INJECTION, SOLUTION SUBCUTANEOUS at 20:19

## 2022-09-22 RX ADMIN — MELOXICAM 15 MG: 15 TABLET ORAL at 08:26

## 2022-09-22 RX ADMIN — ASPIRIN 81 MG: 81 TABLET, COATED ORAL at 20:18

## 2022-09-22 RX ADMIN — SPIRONOLACTONE 50 MG: 50 TABLET ORAL at 09:42

## 2022-09-22 RX ADMIN — INSULIN LISPRO 6 UNITS: 100 INJECTION, SOLUTION INTRAVENOUS; SUBCUTANEOUS at 17:13

## 2022-09-22 RX ADMIN — INSULIN LISPRO 7 UNITS: 100 INJECTION, SOLUTION INTRAVENOUS; SUBCUTANEOUS at 12:36

## 2022-09-22 RX ADMIN — SENNOSIDES AND DOCUSATE SODIUM 2 TABLET: 50; 8.6 TABLET ORAL at 12:58

## 2022-09-22 RX ADMIN — INSULIN LISPRO 8 UNITS: 100 INJECTION, SOLUTION INTRAVENOUS; SUBCUTANEOUS at 12:36

## 2022-09-22 RX ADMIN — OXYCODONE 5 MG: 5 TABLET ORAL at 11:27

## 2022-09-22 RX ADMIN — INSULIN LISPRO 6 UNITS: 100 INJECTION, SOLUTION INTRAVENOUS; SUBCUTANEOUS at 08:25

## 2022-09-22 RX ADMIN — ASPIRIN 81 MG: 81 TABLET, COATED ORAL at 08:25

## 2022-09-22 RX ADMIN — SENNOSIDES AND DOCUSATE SODIUM 2 TABLET: 50; 8.6 TABLET ORAL at 20:18

## 2022-09-22 RX ADMIN — INSULIN LISPRO 5 UNITS: 100 INJECTION, SOLUTION INTRAVENOUS; SUBCUTANEOUS at 08:25

## 2022-09-22 RX ADMIN — SODIUM CHLORIDE, POTASSIUM CHLORIDE, SODIUM LACTATE AND CALCIUM CHLORIDE 100 ML/HR: 600; 310; 30; 20 INJECTION, SOLUTION INTRAVENOUS at 05:12

## 2022-09-22 RX ADMIN — ACETAMINOPHEN 1000 MG: 500 TABLET, FILM COATED ORAL at 17:12

## 2022-09-22 RX ADMIN — FUROSEMIDE 40 MG: 40 TABLET ORAL at 17:12

## 2022-09-22 RX ADMIN — BISACODYL 5 MG: 5 TABLET, COATED ORAL at 12:58

## 2022-09-22 RX ADMIN — OXYCODONE 10 MG: 5 TABLET ORAL at 17:12

## 2022-09-22 RX ADMIN — POTASSIUM CHLORIDE 40 MEQ: 750 CAPSULE, EXTENDED RELEASE ORAL at 10:45

## 2022-09-23 ENCOUNTER — APPOINTMENT (OUTPATIENT)
Dept: CARDIOLOGY | Facility: HOSPITAL | Age: 57
End: 2022-09-23

## 2022-09-23 LAB
ANION GAP SERPL CALCULATED.3IONS-SCNC: 7 MMOL/L (ref 5–15)
BACTERIA SPEC AEROBE CULT: ABNORMAL
BH CV ECHO MEAS - AO MAX PG: 6.6 MMHG
BH CV ECHO MEAS - AO MEAN PG: 3 MMHG
BH CV ECHO MEAS - AO ROOT DIAM: 3.2 CM
BH CV ECHO MEAS - AO V2 MAX: 128 CM/SEC
BH CV ECHO MEAS - AO V2 VTI: 28.6 CM
BH CV ECHO MEAS - AVA(I,D): 3.2 CM2
BH CV ECHO MEAS - EDV(CUBED): 110.6 ML
BH CV ECHO MEAS - EDV(MOD-SP4): 108 ML
BH CV ECHO MEAS - EF(MOD-SP4): 60.8 %
BH CV ECHO MEAS - ESV(CUBED): 22 ML
BH CV ECHO MEAS - ESV(MOD-SP4): 42.3 ML
BH CV ECHO MEAS - FS: 41.7 %
BH CV ECHO MEAS - IVS/LVPW: 0.85 CM
BH CV ECHO MEAS - IVSD: 1.1 CM
BH CV ECHO MEAS - LA DIMENSION: 4.5 CM
BH CV ECHO MEAS - LAT PEAK E' VEL: 9 CM/SEC
BH CV ECHO MEAS - LV DIASTOLIC VOL/BSA (35-75): 50.2 CM2
BH CV ECHO MEAS - LV MASS(C)D: 219.1 GRAMS
BH CV ECHO MEAS - LV MAX PG: 5.1 MMHG
BH CV ECHO MEAS - LV MEAN PG: 3 MMHG
BH CV ECHO MEAS - LV SYSTOLIC VOL/BSA (12-30): 19.7 CM2
BH CV ECHO MEAS - LV V1 MAX: 113 CM/SEC
BH CV ECHO MEAS - LV V1 VTI: 23.8 CM
BH CV ECHO MEAS - LVIDD: 4.8 CM
BH CV ECHO MEAS - LVIDS: 2.8 CM
BH CV ECHO MEAS - LVOT AREA: 3.8 CM2
BH CV ECHO MEAS - LVOT DIAM: 2.2 CM
BH CV ECHO MEAS - LVPWD: 1.3 CM
BH CV ECHO MEAS - MED PEAK E' VEL: 7.7 CM/SEC
BH CV ECHO MEAS - MV A MAX VEL: 89.6 CM/SEC
BH CV ECHO MEAS - MV DEC SLOPE: 442 CM/SEC2
BH CV ECHO MEAS - MV DEC TIME: 0.21 MSEC
BH CV ECHO MEAS - MV E MAX VEL: 94.3 CM/SEC
BH CV ECHO MEAS - MV E/A: 1.05
BH CV ECHO MEAS - MV MAX PG: 4.1 MMHG
BH CV ECHO MEAS - MV MEAN PG: 2 MMHG
BH CV ECHO MEAS - MV V2 VTI: 28.5 CM
BH CV ECHO MEAS - MVA(VTI): 3.2 CM2
BH CV ECHO MEAS - PA ACC TIME: 0.14 SEC
BH CV ECHO MEAS - PA PR(ACCEL): 15.5 MMHG
BH CV ECHO MEAS - PA V2 MAX: 111 CM/SEC
BH CV ECHO MEAS - RAP SYSTOLE: 3 MMHG
BH CV ECHO MEAS - RVSP: 22 MMHG
BH CV ECHO MEAS - SI(MOD-SP4): 30.6 ML/M2
BH CV ECHO MEAS - SV(LVOT): 90.5 ML
BH CV ECHO MEAS - SV(MOD-SP4): 65.7 ML
BH CV ECHO MEAS - TAPSE (>1.6): 3.6 CM
BH CV ECHO MEAS - TR MAX PG: 19.7 MMHG
BH CV ECHO MEAS - TR MAX VEL: 221.5 CM/SEC
BH CV ECHO MEASUREMENTS AVERAGE E/E' RATIO: 11.29
BH CV VAS BP RIGHT ARM: NORMAL MMHG
BH CV XLRA - RV BASE: 3.4 CM
BH CV XLRA - RV LENGTH: 6.7 CM
BH CV XLRA - RV MID: 3.4 CM
BH CV XLRA - TDI S': 20.2 CM/SEC
BUN SERPL-MCNC: 10 MG/DL (ref 6–20)
BUN/CREAT SERPL: 15.6 (ref 7–25)
CALCIUM SPEC-SCNC: 7.4 MG/DL (ref 8.6–10.5)
CHLORIDE SERPL-SCNC: 96 MMOL/L (ref 98–107)
CO2 SERPL-SCNC: 32 MMOL/L (ref 22–29)
CREAT SERPL-MCNC: 0.64 MG/DL (ref 0.76–1.27)
DEPRECATED RDW RBC AUTO: 45.8 FL (ref 37–54)
EGFRCR SERPLBLD CKD-EPI 2021: 110.4 ML/MIN/1.73
ERYTHROCYTE [DISTWIDTH] IN BLOOD BY AUTOMATED COUNT: 16.5 % (ref 12.3–15.4)
GLUCOSE BLDC GLUCOMTR-MCNC: 216 MG/DL (ref 70–130)
GLUCOSE BLDC GLUCOMTR-MCNC: 226 MG/DL (ref 70–130)
GLUCOSE BLDC GLUCOMTR-MCNC: 242 MG/DL (ref 70–130)
GLUCOSE BLDC GLUCOMTR-MCNC: 248 MG/DL (ref 70–130)
GLUCOSE SERPL-MCNC: 257 MG/DL (ref 65–99)
GRAM STN SPEC: ABNORMAL
GRAM STN SPEC: ABNORMAL
HCT VFR BLD AUTO: 25.2 % (ref 37.5–51)
HGB BLD-MCNC: 8.1 G/DL (ref 13–17.7)
ISOLATED FROM: ABNORMAL
IVRT: 85 MSEC
MAGNESIUM SERPL-MCNC: 1.8 MG/DL (ref 1.6–2.6)
MAXIMAL PREDICTED HEART RATE: 163 BPM
MCH RBC QN AUTO: 24.5 PG (ref 26.6–33)
MCHC RBC AUTO-ENTMCNC: 32.1 G/DL (ref 31.5–35.7)
MCV RBC AUTO: 76.1 FL (ref 79–97)
PLATELET # BLD AUTO: 114 10*3/MM3 (ref 140–450)
PMV BLD AUTO: 10.2 FL (ref 6–12)
POTASSIUM SERPL-SCNC: 3.1 MMOL/L (ref 3.5–5.2)
POTASSIUM SERPL-SCNC: 3.8 MMOL/L (ref 3.5–5.2)
RBC # BLD AUTO: 3.31 10*6/MM3 (ref 4.14–5.8)
SODIUM SERPL-SCNC: 135 MMOL/L (ref 136–145)
STRESS TARGET HR: 139 BPM
WBC NRBC COR # BLD: 7.32 10*3/MM3 (ref 3.4–10.8)

## 2022-09-23 PROCEDURE — 85027 COMPLETE CBC AUTOMATED: CPT | Performed by: HOSPITALIST

## 2022-09-23 PROCEDURE — 99233 SBSQ HOSP IP/OBS HIGH 50: CPT | Performed by: HOSPITALIST

## 2022-09-23 PROCEDURE — 97116 GAIT TRAINING THERAPY: CPT

## 2022-09-23 PROCEDURE — 63710000001 DIPHENHYDRAMINE PER 50 MG: Performed by: ORTHOPAEDIC SURGERY

## 2022-09-23 PROCEDURE — 97535 SELF CARE MNGMENT TRAINING: CPT

## 2022-09-23 PROCEDURE — 63710000001 INSULIN LISPRO (HUMAN) PER 5 UNITS: Performed by: ORTHOPAEDIC SURGERY

## 2022-09-23 PROCEDURE — 84132 ASSAY OF SERUM POTASSIUM: CPT | Performed by: HOSPITALIST

## 2022-09-23 PROCEDURE — 93306 TTE W/DOPPLER COMPLETE: CPT

## 2022-09-23 PROCEDURE — 93306 TTE W/DOPPLER COMPLETE: CPT | Performed by: INTERNAL MEDICINE

## 2022-09-23 PROCEDURE — 83735 ASSAY OF MAGNESIUM: CPT | Performed by: HOSPITALIST

## 2022-09-23 PROCEDURE — 0 MAGNESIUM SULFATE 4 GM/100ML SOLUTION: Performed by: HOSPITALIST

## 2022-09-23 PROCEDURE — 97166 OT EVAL MOD COMPLEX 45 MIN: CPT

## 2022-09-23 PROCEDURE — 82962 GLUCOSE BLOOD TEST: CPT

## 2022-09-23 PROCEDURE — 63710000001 INSULIN DETEMIR PER 5 UNITS: Performed by: HOSPITALIST

## 2022-09-23 PROCEDURE — 80048 BASIC METABOLIC PNL TOTAL CA: CPT | Performed by: HOSPITALIST

## 2022-09-23 PROCEDURE — 97110 THERAPEUTIC EXERCISES: CPT

## 2022-09-23 PROCEDURE — 25010000002 DAPTOMYCIN PER 1 MG: Performed by: INTERNAL MEDICINE

## 2022-09-23 PROCEDURE — 63710000001 INSULIN LISPRO (HUMAN) PER 5 UNITS: Performed by: HOSPITALIST

## 2022-09-23 PROCEDURE — 25010000002 CEFTRIAXONE PER 250 MG: Performed by: ORTHOPAEDIC SURGERY

## 2022-09-23 RX ORDER — TRAZODONE HYDROCHLORIDE 50 MG/1
50 TABLET ORAL NIGHTLY
Status: DISCONTINUED | OUTPATIENT
Start: 2022-09-23 | End: 2022-10-05

## 2022-09-23 RX ORDER — INSULIN LISPRO 100 [IU]/ML
10 INJECTION, SOLUTION INTRAVENOUS; SUBCUTANEOUS
Status: DISCONTINUED | OUTPATIENT
Start: 2022-09-23 | End: 2022-09-25

## 2022-09-23 RX ADMIN — ACETAMINOPHEN 1000 MG: 500 TABLET, FILM COATED ORAL at 10:30

## 2022-09-23 RX ADMIN — INSULIN DETEMIR 15 UNITS: 100 INJECTION, SOLUTION SUBCUTANEOUS at 20:33

## 2022-09-23 RX ADMIN — DIPHENHYDRAMINE HYDROCHLORIDE 25 MG: 25 CAPSULE ORAL at 20:34

## 2022-09-23 RX ADMIN — INSULIN LISPRO 4 UNITS: 100 INJECTION, SOLUTION INTRAVENOUS; SUBCUTANEOUS at 12:35

## 2022-09-23 RX ADMIN — SPIRONOLACTONE 50 MG: 50 TABLET ORAL at 09:05

## 2022-09-23 RX ADMIN — SENNOSIDES AND DOCUSATE SODIUM 2 TABLET: 50; 8.6 TABLET ORAL at 09:05

## 2022-09-23 RX ADMIN — SODIUM CHLORIDE 2 G: 900 INJECTION INTRAVENOUS at 01:42

## 2022-09-23 RX ADMIN — MULTIVITAMIN TABLET 1 TABLET: TABLET at 09:05

## 2022-09-23 RX ADMIN — INSULIN DETEMIR 15 UNITS: 100 INJECTION, SOLUTION SUBCUTANEOUS at 08:22

## 2022-09-23 RX ADMIN — SENNOSIDES AND DOCUSATE SODIUM 2 TABLET: 50; 8.6 TABLET ORAL at 20:34

## 2022-09-23 RX ADMIN — OXYCODONE 10 MG: 5 TABLET ORAL at 20:34

## 2022-09-23 RX ADMIN — MELOXICAM 15 MG: 15 TABLET ORAL at 09:06

## 2022-09-23 RX ADMIN — FUROSEMIDE 40 MG: 40 TABLET ORAL at 17:12

## 2022-09-23 RX ADMIN — ACETAMINOPHEN 1000 MG: 500 TABLET, FILM COATED ORAL at 01:41

## 2022-09-23 RX ADMIN — INSULIN LISPRO 4 UNITS: 100 INJECTION, SOLUTION INTRAVENOUS; SUBCUTANEOUS at 17:11

## 2022-09-23 RX ADMIN — OXYCODONE 10 MG: 5 TABLET ORAL at 09:05

## 2022-09-23 RX ADMIN — ACETAMINOPHEN 1000 MG: 500 TABLET, FILM COATED ORAL at 18:04

## 2022-09-23 RX ADMIN — MAGNESIUM SULFATE HEPTAHYDRATE 4 G: 40 INJECTION, SOLUTION INTRAVENOUS at 18:04

## 2022-09-23 RX ADMIN — INSULIN LISPRO 8 UNITS: 100 INJECTION, SOLUTION INTRAVENOUS; SUBCUTANEOUS at 08:23

## 2022-09-23 RX ADMIN — ASPIRIN 81 MG: 81 TABLET, COATED ORAL at 09:05

## 2022-09-23 RX ADMIN — ASPIRIN 81 MG: 81 TABLET, COATED ORAL at 20:34

## 2022-09-23 RX ADMIN — POLYETHYLENE GLYCOL 3350 17 G: 17 POWDER, FOR SOLUTION ORAL at 09:16

## 2022-09-23 RX ADMIN — OXYCODONE 10 MG: 5 TABLET ORAL at 01:41

## 2022-09-23 RX ADMIN — TRAZODONE HYDROCHLORIDE 50 MG: 50 TABLET ORAL at 20:34

## 2022-09-23 RX ADMIN — INSULIN LISPRO 4 UNITS: 100 INJECTION, SOLUTION INTRAVENOUS; SUBCUTANEOUS at 08:22

## 2022-09-23 RX ADMIN — INSULIN LISPRO 10 UNITS: 100 INJECTION, SOLUTION INTRAVENOUS; SUBCUTANEOUS at 17:11

## 2022-09-23 RX ADMIN — BISACODYL 5 MG: 5 TABLET, COATED ORAL at 17:12

## 2022-09-23 RX ADMIN — INSULIN LISPRO 10 UNITS: 100 INJECTION, SOLUTION INTRAVENOUS; SUBCUTANEOUS at 12:36

## 2022-09-23 RX ADMIN — Medication 10 ML: at 09:05

## 2022-09-23 RX ADMIN — NADOLOL 20 MG: 20 TABLET ORAL at 09:05

## 2022-09-23 RX ADMIN — FUROSEMIDE 40 MG: 40 TABLET ORAL at 09:05

## 2022-09-23 RX ADMIN — POTASSIUM CHLORIDE 40 MEQ: 750 CAPSULE, EXTENDED RELEASE ORAL at 05:18

## 2022-09-23 RX ADMIN — DAPTOMYCIN 650 MG: 500 INJECTION, POWDER, LYOPHILIZED, FOR SOLUTION INTRAVENOUS at 12:36

## 2022-09-24 PROBLEM — I07.9 ENDOCARDITIS OF TRICUSPID VALVE: Status: ACTIVE | Noted: 2022-09-24

## 2022-09-24 PROBLEM — R78.81 MRSA BACTEREMIA: Status: ACTIVE | Noted: 2022-09-24

## 2022-09-24 PROBLEM — B95.62 MRSA BACTEREMIA: Status: ACTIVE | Noted: 2022-09-24

## 2022-09-24 LAB
ANION GAP SERPL CALCULATED.3IONS-SCNC: 6 MMOL/L (ref 5–15)
BACTERIA FLD CULT: ABNORMAL
BACTERIA SPEC AEROBE CULT: ABNORMAL
BACTERIA SPEC AEROBE CULT: ABNORMAL
BUN SERPL-MCNC: 10 MG/DL (ref 6–20)
BUN/CREAT SERPL: 14.9 (ref 7–25)
CALCIUM SPEC-SCNC: 7.4 MG/DL (ref 8.6–10.5)
CHLORIDE SERPL-SCNC: 97 MMOL/L (ref 98–107)
CO2 SERPL-SCNC: 34 MMOL/L (ref 22–29)
CREAT SERPL-MCNC: 0.67 MG/DL (ref 0.76–1.27)
DEPRECATED RDW RBC AUTO: 45.1 FL (ref 37–54)
EGFRCR SERPLBLD CKD-EPI 2021: 108.9 ML/MIN/1.73
ERYTHROCYTE [DISTWIDTH] IN BLOOD BY AUTOMATED COUNT: 16.7 % (ref 12.3–15.4)
GLUCOSE BLDC GLUCOMTR-MCNC: 131 MG/DL (ref 70–130)
GLUCOSE BLDC GLUCOMTR-MCNC: 189 MG/DL (ref 70–130)
GLUCOSE BLDC GLUCOMTR-MCNC: 211 MG/DL (ref 70–130)
GLUCOSE BLDC GLUCOMTR-MCNC: 254 MG/DL (ref 70–130)
GLUCOSE SERPL-MCNC: 154 MG/DL (ref 65–99)
GRAM STN SPEC: ABNORMAL
HCT VFR BLD AUTO: 24.2 % (ref 37.5–51)
HGB BLD-MCNC: 8 G/DL (ref 13–17.7)
MAGNESIUM SERPL-MCNC: 2 MG/DL (ref 1.6–2.6)
MCH RBC QN AUTO: 24.7 PG (ref 26.6–33)
MCHC RBC AUTO-ENTMCNC: 33.1 G/DL (ref 31.5–35.7)
MCV RBC AUTO: 74.7 FL (ref 79–97)
PLATELET # BLD AUTO: 125 10*3/MM3 (ref 140–450)
PMV BLD AUTO: 10.4 FL (ref 6–12)
POTASSIUM SERPL-SCNC: 3.1 MMOL/L (ref 3.5–5.2)
POTASSIUM SERPL-SCNC: 4.1 MMOL/L (ref 3.5–5.2)
RBC # BLD AUTO: 3.24 10*6/MM3 (ref 4.14–5.8)
SODIUM SERPL-SCNC: 137 MMOL/L (ref 136–145)
WBC NRBC COR # BLD: 6.33 10*3/MM3 (ref 3.4–10.8)

## 2022-09-24 PROCEDURE — 82962 GLUCOSE BLOOD TEST: CPT

## 2022-09-24 PROCEDURE — 25010000002 DAPTOMYCIN PER 1 MG: Performed by: INTERNAL MEDICINE

## 2022-09-24 PROCEDURE — 97530 THERAPEUTIC ACTIVITIES: CPT

## 2022-09-24 PROCEDURE — 63710000001 INSULIN DETEMIR PER 5 UNITS: Performed by: HOSPITALIST

## 2022-09-24 PROCEDURE — 83735 ASSAY OF MAGNESIUM: CPT | Performed by: HOSPITALIST

## 2022-09-24 PROCEDURE — 63710000001 INSULIN LISPRO (HUMAN) PER 5 UNITS: Performed by: ORTHOPAEDIC SURGERY

## 2022-09-24 PROCEDURE — 84132 ASSAY OF SERUM POTASSIUM: CPT | Performed by: HOSPITALIST

## 2022-09-24 PROCEDURE — 25010000002 CEFTRIAXONE PER 250 MG: Performed by: ORTHOPAEDIC SURGERY

## 2022-09-24 PROCEDURE — 99222 1ST HOSP IP/OBS MODERATE 55: CPT | Performed by: STUDENT IN AN ORGANIZED HEALTH CARE EDUCATION/TRAINING PROGRAM

## 2022-09-24 PROCEDURE — 85027 COMPLETE CBC AUTOMATED: CPT | Performed by: HOSPITALIST

## 2022-09-24 PROCEDURE — 97110 THERAPEUTIC EXERCISES: CPT

## 2022-09-24 PROCEDURE — 80048 BASIC METABOLIC PNL TOTAL CA: CPT | Performed by: HOSPITALIST

## 2022-09-24 PROCEDURE — 63710000001 INSULIN LISPRO (HUMAN) PER 5 UNITS: Performed by: HOSPITALIST

## 2022-09-24 PROCEDURE — 99233 SBSQ HOSP IP/OBS HIGH 50: CPT | Performed by: HOSPITALIST

## 2022-09-24 RX ORDER — POTASSIUM CHLORIDE 750 MG/1
20 CAPSULE, EXTENDED RELEASE ORAL 2 TIMES DAILY WITH MEALS
Status: DISCONTINUED | OUTPATIENT
Start: 2022-09-24 | End: 2022-10-02

## 2022-09-24 RX ADMIN — INSULIN LISPRO 10 UNITS: 100 INJECTION, SOLUTION INTRAVENOUS; SUBCUTANEOUS at 12:49

## 2022-09-24 RX ADMIN — INSULIN LISPRO 10 UNITS: 100 INJECTION, SOLUTION INTRAVENOUS; SUBCUTANEOUS at 17:50

## 2022-09-24 RX ADMIN — NADOLOL 20 MG: 20 TABLET ORAL at 10:17

## 2022-09-24 RX ADMIN — FUROSEMIDE 40 MG: 40 TABLET ORAL at 17:50

## 2022-09-24 RX ADMIN — POLYETHYLENE GLYCOL 3350 17 G: 17 POWDER, FOR SOLUTION ORAL at 10:09

## 2022-09-24 RX ADMIN — OXYCODONE 10 MG: 5 TABLET ORAL at 18:22

## 2022-09-24 RX ADMIN — BISACODYL 5 MG: 5 TABLET, COATED ORAL at 16:12

## 2022-09-24 RX ADMIN — ACETAMINOPHEN 1000 MG: 500 TABLET, FILM COATED ORAL at 18:22

## 2022-09-24 RX ADMIN — ACETAMINOPHEN 1000 MG: 500 TABLET, FILM COATED ORAL at 12:49

## 2022-09-24 RX ADMIN — INSULIN LISPRO 6 UNITS: 100 INJECTION, SOLUTION INTRAVENOUS; SUBCUTANEOUS at 12:50

## 2022-09-24 RX ADMIN — SENNOSIDES AND DOCUSATE SODIUM 2 TABLET: 50; 8.6 TABLET ORAL at 21:37

## 2022-09-24 RX ADMIN — ASPIRIN 81 MG: 81 TABLET, COATED ORAL at 10:10

## 2022-09-24 RX ADMIN — TRAMADOL HYDROCHLORIDE 50 MG: 50 TABLET ORAL at 16:12

## 2022-09-24 RX ADMIN — INSULIN LISPRO 10 UNITS: 100 INJECTION, SOLUTION INTRAVENOUS; SUBCUTANEOUS at 10:09

## 2022-09-24 RX ADMIN — FUROSEMIDE 40 MG: 40 TABLET ORAL at 10:10

## 2022-09-24 RX ADMIN — SODIUM CHLORIDE 2 G: 900 INJECTION INTRAVENOUS at 03:04

## 2022-09-24 RX ADMIN — MULTIVITAMIN TABLET 1 TABLET: TABLET at 10:09

## 2022-09-24 RX ADMIN — POTASSIUM CHLORIDE 40 MEQ: 750 CAPSULE, EXTENDED RELEASE ORAL at 10:09

## 2022-09-24 RX ADMIN — POTASSIUM CHLORIDE 40 MEQ: 750 CAPSULE, EXTENDED RELEASE ORAL at 14:05

## 2022-09-24 RX ADMIN — SENNOSIDES AND DOCUSATE SODIUM 2 TABLET: 50; 8.6 TABLET ORAL at 10:10

## 2022-09-24 RX ADMIN — OXYCODONE 10 MG: 5 TABLET ORAL at 10:17

## 2022-09-24 RX ADMIN — Medication 10 ML: at 21:41

## 2022-09-24 RX ADMIN — INSULIN LISPRO 4 UNITS: 100 INJECTION, SOLUTION INTRAVENOUS; SUBCUTANEOUS at 17:50

## 2022-09-24 RX ADMIN — ACETAMINOPHEN 1000 MG: 500 TABLET, FILM COATED ORAL at 03:04

## 2022-09-24 RX ADMIN — INSULIN DETEMIR 15 UNITS: 100 INJECTION, SOLUTION SUBCUTANEOUS at 21:37

## 2022-09-24 RX ADMIN — TRAZODONE HYDROCHLORIDE 50 MG: 50 TABLET ORAL at 21:37

## 2022-09-24 RX ADMIN — ASPIRIN 81 MG: 81 TABLET, COATED ORAL at 21:37

## 2022-09-24 RX ADMIN — DAPTOMYCIN 650 MG: 500 INJECTION, POWDER, LYOPHILIZED, FOR SOLUTION INTRAVENOUS at 12:50

## 2022-09-24 RX ADMIN — POTASSIUM CHLORIDE 40 MEQ: 750 CAPSULE, EXTENDED RELEASE ORAL at 17:50

## 2022-09-24 RX ADMIN — OXYCODONE 10 MG: 5 TABLET ORAL at 14:40

## 2022-09-24 RX ADMIN — MELOXICAM 15 MG: 15 TABLET ORAL at 10:09

## 2022-09-24 RX ADMIN — INSULIN DETEMIR 15 UNITS: 100 INJECTION, SOLUTION SUBCUTANEOUS at 10:07

## 2022-09-24 RX ADMIN — SPIRONOLACTONE 50 MG: 50 TABLET ORAL at 10:17

## 2022-09-25 LAB
ANION GAP SERPL CALCULATED.3IONS-SCNC: 6 MMOL/L (ref 5–15)
BACTERIA SPEC AEROBE CULT: ABNORMAL
BACTERIA SPEC AEROBE CULT: ABNORMAL
BUN SERPL-MCNC: 13 MG/DL (ref 6–20)
BUN/CREAT SERPL: 21.3 (ref 7–25)
CALCIUM SPEC-SCNC: 7.6 MG/DL (ref 8.6–10.5)
CHLORIDE SERPL-SCNC: 99 MMOL/L (ref 98–107)
CO2 SERPL-SCNC: 31 MMOL/L (ref 22–29)
CREAT SERPL-MCNC: 0.61 MG/DL (ref 0.76–1.27)
DEPRECATED RDW RBC AUTO: 46.7 FL (ref 37–54)
EGFRCR SERPLBLD CKD-EPI 2021: 112 ML/MIN/1.73
ERYTHROCYTE [DISTWIDTH] IN BLOOD BY AUTOMATED COUNT: 16.7 % (ref 12.3–15.4)
GLUCOSE BLDC GLUCOMTR-MCNC: 144 MG/DL (ref 70–130)
GLUCOSE BLDC GLUCOMTR-MCNC: 152 MG/DL (ref 70–130)
GLUCOSE BLDC GLUCOMTR-MCNC: 228 MG/DL (ref 70–130)
GLUCOSE BLDC GLUCOMTR-MCNC: 252 MG/DL (ref 70–130)
GLUCOSE SERPL-MCNC: 141 MG/DL (ref 65–99)
GRAM STN SPEC: ABNORMAL
HCT VFR BLD AUTO: 24.7 % (ref 37.5–51)
HGB BLD-MCNC: 7.9 G/DL (ref 13–17.7)
ISOLATED FROM: ABNORMAL
MCH RBC QN AUTO: 24.7 PG (ref 26.6–33)
MCHC RBC AUTO-ENTMCNC: 32 G/DL (ref 31.5–35.7)
MCV RBC AUTO: 77.2 FL (ref 79–97)
PLATELET # BLD AUTO: 123 10*3/MM3 (ref 140–450)
PMV BLD AUTO: 10.4 FL (ref 6–12)
POTASSIUM SERPL-SCNC: 4.4 MMOL/L (ref 3.5–5.2)
RBC # BLD AUTO: 3.2 10*6/MM3 (ref 4.14–5.8)
SODIUM SERPL-SCNC: 136 MMOL/L (ref 136–145)
WBC NRBC COR # BLD: 6.2 10*3/MM3 (ref 3.4–10.8)

## 2022-09-25 PROCEDURE — 63710000001 INSULIN DETEMIR PER 5 UNITS: Performed by: INTERNAL MEDICINE

## 2022-09-25 PROCEDURE — 97110 THERAPEUTIC EXERCISES: CPT

## 2022-09-25 PROCEDURE — 82962 GLUCOSE BLOOD TEST: CPT

## 2022-09-25 PROCEDURE — 97116 GAIT TRAINING THERAPY: CPT

## 2022-09-25 PROCEDURE — 63710000001 INSULIN LISPRO (HUMAN) PER 5 UNITS: Performed by: ORTHOPAEDIC SURGERY

## 2022-09-25 PROCEDURE — 25010000002 DAPTOMYCIN PER 1 MG: Performed by: INTERNAL MEDICINE

## 2022-09-25 PROCEDURE — 63710000001 INSULIN DETEMIR PER 5 UNITS: Performed by: HOSPITALIST

## 2022-09-25 PROCEDURE — 99232 SBSQ HOSP IP/OBS MODERATE 35: CPT | Performed by: INTERNAL MEDICINE

## 2022-09-25 PROCEDURE — 85027 COMPLETE CBC AUTOMATED: CPT | Performed by: HOSPITALIST

## 2022-09-25 PROCEDURE — 99231 SBSQ HOSP IP/OBS SF/LOW 25: CPT | Performed by: PHYSICIAN ASSISTANT

## 2022-09-25 PROCEDURE — 63710000001 INSULIN LISPRO (HUMAN) PER 5 UNITS: Performed by: INTERNAL MEDICINE

## 2022-09-25 PROCEDURE — 63710000001 INSULIN LISPRO (HUMAN) PER 5 UNITS: Performed by: HOSPITALIST

## 2022-09-25 PROCEDURE — 25010000002 CEFTRIAXONE PER 250 MG: Performed by: ORTHOPAEDIC SURGERY

## 2022-09-25 PROCEDURE — 80048 BASIC METABOLIC PNL TOTAL CA: CPT | Performed by: HOSPITALIST

## 2022-09-25 RX ORDER — INSULIN LISPRO 100 [IU]/ML
12 INJECTION, SOLUTION INTRAVENOUS; SUBCUTANEOUS
Status: DISCONTINUED | OUTPATIENT
Start: 2022-09-25 | End: 2022-09-29

## 2022-09-25 RX ADMIN — ACETAMINOPHEN 1000 MG: 500 TABLET, FILM COATED ORAL at 12:58

## 2022-09-25 RX ADMIN — INSULIN LISPRO 12 UNITS: 100 INJECTION, SOLUTION INTRAVENOUS; SUBCUTANEOUS at 12:58

## 2022-09-25 RX ADMIN — ACETAMINOPHEN 1000 MG: 500 TABLET, FILM COATED ORAL at 18:09

## 2022-09-25 RX ADMIN — SODIUM CHLORIDE 2 G: 900 INJECTION INTRAVENOUS at 03:51

## 2022-09-25 RX ADMIN — MELOXICAM 15 MG: 15 TABLET ORAL at 08:05

## 2022-09-25 RX ADMIN — ACETAMINOPHEN 1000 MG: 500 TABLET, FILM COATED ORAL at 03:50

## 2022-09-25 RX ADMIN — POLYETHYLENE GLYCOL 3350 17 G: 17 POWDER, FOR SOLUTION ORAL at 08:06

## 2022-09-25 RX ADMIN — INSULIN LISPRO 10 UNITS: 100 INJECTION, SOLUTION INTRAVENOUS; SUBCUTANEOUS at 08:06

## 2022-09-25 RX ADMIN — ASPIRIN 81 MG: 81 TABLET, COATED ORAL at 08:06

## 2022-09-25 RX ADMIN — OXYCODONE 10 MG: 5 TABLET ORAL at 08:06

## 2022-09-25 RX ADMIN — SENNOSIDES AND DOCUSATE SODIUM 2 TABLET: 50; 8.6 TABLET ORAL at 08:06

## 2022-09-25 RX ADMIN — INSULIN DETEMIR 15 UNITS: 100 INJECTION, SOLUTION SUBCUTANEOUS at 08:05

## 2022-09-25 RX ADMIN — POTASSIUM CHLORIDE 20 MEQ: 750 CAPSULE, EXTENDED RELEASE ORAL at 17:31

## 2022-09-25 RX ADMIN — TRAZODONE HYDROCHLORIDE 50 MG: 50 TABLET ORAL at 20:38

## 2022-09-25 RX ADMIN — INSULIN LISPRO 4 UNITS: 100 INJECTION, SOLUTION INTRAVENOUS; SUBCUTANEOUS at 17:30

## 2022-09-25 RX ADMIN — MULTIVITAMIN TABLET 1 TABLET: TABLET at 08:06

## 2022-09-25 RX ADMIN — ASPIRIN 81 MG: 81 TABLET, COATED ORAL at 20:38

## 2022-09-25 RX ADMIN — INSULIN DETEMIR 16 UNITS: 100 INJECTION, SOLUTION SUBCUTANEOUS at 20:38

## 2022-09-25 RX ADMIN — FUROSEMIDE 40 MG: 40 TABLET ORAL at 08:05

## 2022-09-25 RX ADMIN — SENNOSIDES AND DOCUSATE SODIUM 2 TABLET: 50; 8.6 TABLET ORAL at 20:38

## 2022-09-25 RX ADMIN — SPIRONOLACTONE 50 MG: 50 TABLET ORAL at 10:19

## 2022-09-25 RX ADMIN — FUROSEMIDE 40 MG: 40 TABLET ORAL at 17:31

## 2022-09-25 RX ADMIN — INSULIN LISPRO 12 UNITS: 100 INJECTION, SOLUTION INTRAVENOUS; SUBCUTANEOUS at 17:30

## 2022-09-25 RX ADMIN — NADOLOL 20 MG: 20 TABLET ORAL at 10:19

## 2022-09-25 RX ADMIN — INSULIN LISPRO 2 UNITS: 100 INJECTION, SOLUTION INTRAVENOUS; SUBCUTANEOUS at 08:05

## 2022-09-25 RX ADMIN — DAPTOMYCIN 650 MG: 500 INJECTION, POWDER, LYOPHILIZED, FOR SOLUTION INTRAVENOUS at 10:40

## 2022-09-25 RX ADMIN — POTASSIUM CHLORIDE 20 MEQ: 750 CAPSULE, EXTENDED RELEASE ORAL at 08:05

## 2022-09-26 ENCOUNTER — APPOINTMENT (OUTPATIENT)
Dept: CARDIOLOGY | Facility: HOSPITAL | Age: 57
End: 2022-09-26

## 2022-09-26 LAB
BH CV VAS BP LEFT ARM: NORMAL MMHG
GLUCOSE BLDC GLUCOMTR-MCNC: 179 MG/DL (ref 70–130)
GLUCOSE BLDC GLUCOMTR-MCNC: 210 MG/DL (ref 70–130)
GLUCOSE BLDC GLUCOMTR-MCNC: 253 MG/DL (ref 70–130)
GLUCOSE BLDC GLUCOMTR-MCNC: 279 MG/DL (ref 70–130)
MAXIMAL PREDICTED HEART RATE: 163 BPM
STRESS TARGET HR: 139 BPM

## 2022-09-26 PROCEDURE — 93312 ECHO TRANSESOPHAGEAL: CPT | Performed by: INTERNAL MEDICINE

## 2022-09-26 PROCEDURE — 97535 SELF CARE MNGMENT TRAINING: CPT | Performed by: OCCUPATIONAL THERAPIST

## 2022-09-26 PROCEDURE — 93312 ECHO TRANSESOPHAGEAL: CPT

## 2022-09-26 PROCEDURE — 63710000001 INSULIN LISPRO (HUMAN) PER 5 UNITS: Performed by: ORTHOPAEDIC SURGERY

## 2022-09-26 PROCEDURE — 99152 MOD SED SAME PHYS/QHP 5/>YRS: CPT | Performed by: INTERNAL MEDICINE

## 2022-09-26 PROCEDURE — 25010000002 FENTANYL CITRATE (PF) 50 MCG/ML SOLUTION: Performed by: INTERNAL MEDICINE

## 2022-09-26 PROCEDURE — 63710000001 INSULIN DETEMIR PER 5 UNITS: Performed by: NURSE PRACTITIONER

## 2022-09-26 PROCEDURE — 25010000002 DAPTOMYCIN PER 1 MG: Performed by: INTERNAL MEDICINE

## 2022-09-26 PROCEDURE — 63710000001 INSULIN LISPRO (HUMAN) PER 5 UNITS: Performed by: INTERNAL MEDICINE

## 2022-09-26 PROCEDURE — 87040 BLOOD CULTURE FOR BACTERIA: CPT | Performed by: INTERNAL MEDICINE

## 2022-09-26 PROCEDURE — 99152 MOD SED SAME PHYS/QHP 5/>YRS: CPT

## 2022-09-26 PROCEDURE — 93321 DOPPLER ECHO F-UP/LMTD STD: CPT

## 2022-09-26 PROCEDURE — 93321 DOPPLER ECHO F-UP/LMTD STD: CPT | Performed by: INTERNAL MEDICINE

## 2022-09-26 PROCEDURE — 99231 SBSQ HOSP IP/OBS SF/LOW 25: CPT

## 2022-09-26 PROCEDURE — 97110 THERAPEUTIC EXERCISES: CPT | Performed by: OCCUPATIONAL THERAPIST

## 2022-09-26 PROCEDURE — 99232 SBSQ HOSP IP/OBS MODERATE 35: CPT | Performed by: NURSE PRACTITIONER

## 2022-09-26 PROCEDURE — 93325 DOPPLER ECHO COLOR FLOW MAPG: CPT | Performed by: INTERNAL MEDICINE

## 2022-09-26 PROCEDURE — 93325 DOPPLER ECHO COLOR FLOW MAPG: CPT

## 2022-09-26 PROCEDURE — 82962 GLUCOSE BLOOD TEST: CPT

## 2022-09-26 PROCEDURE — 25010000002 MIDAZOLAM PER 1 MG: Performed by: INTERNAL MEDICINE

## 2022-09-26 PROCEDURE — 97116 GAIT TRAINING THERAPY: CPT

## 2022-09-26 PROCEDURE — 99153 MOD SED SAME PHYS/QHP EA: CPT

## 2022-09-26 RX ORDER — FENTANYL CITRATE 50 UG/ML
INJECTION, SOLUTION INTRAMUSCULAR; INTRAVENOUS
Status: COMPLETED | OUTPATIENT
Start: 2022-09-26 | End: 2022-09-26

## 2022-09-26 RX ORDER — MIDAZOLAM HYDROCHLORIDE 1 MG/ML
INJECTION INTRAMUSCULAR; INTRAVENOUS
Status: COMPLETED | OUTPATIENT
Start: 2022-09-26 | End: 2022-09-26

## 2022-09-26 RX ADMIN — MIDAZOLAM 1 MG: 1 INJECTION INTRAMUSCULAR; INTRAVENOUS at 15:10

## 2022-09-26 RX ADMIN — FENTANYL CITRATE 25 MCG: 50 INJECTION, SOLUTION INTRAMUSCULAR; INTRAVENOUS at 15:10

## 2022-09-26 RX ADMIN — ASPIRIN 81 MG: 81 TABLET, COATED ORAL at 21:28

## 2022-09-26 RX ADMIN — POTASSIUM CHLORIDE 20 MEQ: 750 CAPSULE, EXTENDED RELEASE ORAL at 09:26

## 2022-09-26 RX ADMIN — ACETAMINOPHEN 1000 MG: 500 TABLET, FILM COATED ORAL at 12:18

## 2022-09-26 RX ADMIN — MULTIVITAMIN TABLET 1 TABLET: TABLET at 09:26

## 2022-09-26 RX ADMIN — TRAZODONE HYDROCHLORIDE 50 MG: 50 TABLET ORAL at 21:30

## 2022-09-26 RX ADMIN — POTASSIUM CHLORIDE 20 MEQ: 750 CAPSULE, EXTENDED RELEASE ORAL at 17:24

## 2022-09-26 RX ADMIN — BISACODYL 10 MG: 10 SUPPOSITORY RECTAL at 09:26

## 2022-09-26 RX ADMIN — INSULIN DETEMIR 18 UNITS: 100 INJECTION, SOLUTION SUBCUTANEOUS at 21:30

## 2022-09-26 RX ADMIN — FENTANYL CITRATE 25 MCG: 50 INJECTION, SOLUTION INTRAMUSCULAR; INTRAVENOUS at 15:05

## 2022-09-26 RX ADMIN — INSULIN LISPRO 6 UNITS: 100 INJECTION, SOLUTION INTRAVENOUS; SUBCUTANEOUS at 08:33

## 2022-09-26 RX ADMIN — INSULIN LISPRO 4 UNITS: 100 INJECTION, SOLUTION INTRAVENOUS; SUBCUTANEOUS at 17:24

## 2022-09-26 RX ADMIN — INSULIN LISPRO 12 UNITS: 100 INJECTION, SOLUTION INTRAVENOUS; SUBCUTANEOUS at 17:24

## 2022-09-26 RX ADMIN — DAPTOMYCIN 650 MG: 500 INJECTION, POWDER, LYOPHILIZED, FOR SOLUTION INTRAVENOUS at 09:27

## 2022-09-26 RX ADMIN — Medication 10 ML: at 21:28

## 2022-09-26 RX ADMIN — SENNOSIDES AND DOCUSATE SODIUM 2 TABLET: 50; 8.6 TABLET ORAL at 09:27

## 2022-09-26 RX ADMIN — FUROSEMIDE 40 MG: 40 TABLET ORAL at 17:24

## 2022-09-26 RX ADMIN — ASPIRIN 81 MG: 81 TABLET, COATED ORAL at 09:27

## 2022-09-26 RX ADMIN — FENTANYL CITRATE 25 MCG: 50 INJECTION, SOLUTION INTRAMUSCULAR; INTRAVENOUS at 15:08

## 2022-09-26 RX ADMIN — INSULIN LISPRO 6 UNITS: 100 INJECTION, SOLUTION INTRAVENOUS; SUBCUTANEOUS at 12:18

## 2022-09-26 RX ADMIN — OXYCODONE 10 MG: 5 TABLET ORAL at 09:26

## 2022-09-26 RX ADMIN — Medication 10 ML: at 09:28

## 2022-09-26 RX ADMIN — OXYCODONE 10 MG: 5 TABLET ORAL at 13:03

## 2022-09-26 RX ADMIN — MIDAZOLAM 1 MG: 1 INJECTION INTRAMUSCULAR; INTRAVENOUS at 15:04

## 2022-09-26 RX ADMIN — MELOXICAM 15 MG: 15 TABLET ORAL at 09:27

## 2022-09-26 RX ADMIN — ACETAMINOPHEN 1000 MG: 500 TABLET, FILM COATED ORAL at 04:04

## 2022-09-26 RX ADMIN — FUROSEMIDE 40 MG: 40 TABLET ORAL at 09:27

## 2022-09-26 RX ADMIN — ACETAMINOPHEN 1000 MG: 500 TABLET, FILM COATED ORAL at 18:11

## 2022-09-26 RX ADMIN — SPIRONOLACTONE 50 MG: 50 TABLET ORAL at 09:27

## 2022-09-26 RX ADMIN — NADOLOL 20 MG: 20 TABLET ORAL at 09:27

## 2022-09-26 RX ADMIN — MIDAZOLAM 1 MG: 1 INJECTION INTRAMUSCULAR; INTRAVENOUS at 15:07

## 2022-09-27 LAB
ALBUMIN SERPL-MCNC: 2.2 G/DL (ref 3.5–5.2)
ALBUMIN/GLOB SERPL: 0.8 G/DL
ALP SERPL-CCNC: 203 U/L (ref 39–117)
ALT SERPL W P-5'-P-CCNC: 19 U/L (ref 1–41)
ANION GAP SERPL CALCULATED.3IONS-SCNC: 8 MMOL/L (ref 5–15)
AST SERPL-CCNC: 34 U/L (ref 1–40)
BILIRUB SERPL-MCNC: 0.6 MG/DL (ref 0–1.2)
BUN SERPL-MCNC: 12 MG/DL (ref 6–20)
BUN/CREAT SERPL: 19.4 (ref 7–25)
CALCIUM SPEC-SCNC: 8 MG/DL (ref 8.6–10.5)
CHLORIDE SERPL-SCNC: 98 MMOL/L (ref 98–107)
CO2 SERPL-SCNC: 29 MMOL/L (ref 22–29)
CREAT SERPL-MCNC: 0.62 MG/DL (ref 0.76–1.27)
DEPRECATED RDW RBC AUTO: 46 FL (ref 37–54)
EGFRCR SERPLBLD CKD-EPI 2021: 111.5 ML/MIN/1.73
ERYTHROCYTE [DISTWIDTH] IN BLOOD BY AUTOMATED COUNT: 16.9 % (ref 12.3–15.4)
GLOBULIN UR ELPH-MCNC: 2.9 GM/DL
GLUCOSE BLDC GLUCOMTR-MCNC: 118 MG/DL (ref 70–130)
GLUCOSE BLDC GLUCOMTR-MCNC: 165 MG/DL (ref 70–130)
GLUCOSE BLDC GLUCOMTR-MCNC: 213 MG/DL (ref 70–130)
GLUCOSE BLDC GLUCOMTR-MCNC: 217 MG/DL (ref 70–130)
GLUCOSE SERPL-MCNC: 129 MG/DL (ref 65–99)
HCT VFR BLD AUTO: 25.3 % (ref 37.5–51)
HGB BLD-MCNC: 8.2 G/DL (ref 13–17.7)
MCH RBC QN AUTO: 24.8 PG (ref 26.6–33)
MCHC RBC AUTO-ENTMCNC: 32.4 G/DL (ref 31.5–35.7)
MCV RBC AUTO: 76.4 FL (ref 79–97)
PLATELET # BLD AUTO: 158 10*3/MM3 (ref 140–450)
PMV BLD AUTO: 10 FL (ref 6–12)
POTASSIUM SERPL-SCNC: 4.5 MMOL/L (ref 3.5–5.2)
PROT SERPL-MCNC: 5.1 G/DL (ref 6–8.5)
RBC # BLD AUTO: 3.31 10*6/MM3 (ref 4.14–5.8)
SODIUM SERPL-SCNC: 135 MMOL/L (ref 136–145)
WBC NRBC COR # BLD: 6.67 10*3/MM3 (ref 3.4–10.8)

## 2022-09-27 PROCEDURE — 99231 SBSQ HOSP IP/OBS SF/LOW 25: CPT

## 2022-09-27 PROCEDURE — 99232 SBSQ HOSP IP/OBS MODERATE 35: CPT | Performed by: NURSE PRACTITIONER

## 2022-09-27 PROCEDURE — 97530 THERAPEUTIC ACTIVITIES: CPT

## 2022-09-27 PROCEDURE — 85027 COMPLETE CBC AUTOMATED: CPT | Performed by: NURSE PRACTITIONER

## 2022-09-27 PROCEDURE — 63710000001 INSULIN DETEMIR PER 5 UNITS: Performed by: NURSE PRACTITIONER

## 2022-09-27 PROCEDURE — 63710000001 INSULIN LISPRO (HUMAN) PER 5 UNITS: Performed by: ORTHOPAEDIC SURGERY

## 2022-09-27 PROCEDURE — 80053 COMPREHEN METABOLIC PANEL: CPT | Performed by: NURSE PRACTITIONER

## 2022-09-27 PROCEDURE — 97116 GAIT TRAINING THERAPY: CPT

## 2022-09-27 PROCEDURE — 82962 GLUCOSE BLOOD TEST: CPT

## 2022-09-27 PROCEDURE — 63710000001 INSULIN LISPRO (HUMAN) PER 5 UNITS: Performed by: INTERNAL MEDICINE

## 2022-09-27 PROCEDURE — 25010000002 DAPTOMYCIN PER 1 MG: Performed by: INTERNAL MEDICINE

## 2022-09-27 RX ADMIN — ASPIRIN 81 MG: 81 TABLET, COATED ORAL at 21:43

## 2022-09-27 RX ADMIN — FUROSEMIDE 40 MG: 40 TABLET ORAL at 09:18

## 2022-09-27 RX ADMIN — OXYCODONE 5 MG: 5 TABLET ORAL at 10:48

## 2022-09-27 RX ADMIN — INSULIN DETEMIR 18 UNITS: 100 INJECTION, SOLUTION SUBCUTANEOUS at 09:17

## 2022-09-27 RX ADMIN — SENNOSIDES AND DOCUSATE SODIUM 2 TABLET: 50; 8.6 TABLET ORAL at 21:40

## 2022-09-27 RX ADMIN — Medication 10 ML: at 21:43

## 2022-09-27 RX ADMIN — INSULIN LISPRO 12 UNITS: 100 INJECTION, SOLUTION INTRAVENOUS; SUBCUTANEOUS at 13:20

## 2022-09-27 RX ADMIN — INSULIN DETEMIR 18 UNITS: 100 INJECTION, SOLUTION SUBCUTANEOUS at 21:41

## 2022-09-27 RX ADMIN — MULTIVITAMIN TABLET 1 TABLET: TABLET at 09:18

## 2022-09-27 RX ADMIN — ASPIRIN 81 MG: 81 TABLET, COATED ORAL at 09:18

## 2022-09-27 RX ADMIN — DAPTOMYCIN 650 MG: 500 INJECTION, POWDER, LYOPHILIZED, FOR SOLUTION INTRAVENOUS at 10:00

## 2022-09-27 RX ADMIN — INSULIN LISPRO 4 UNITS: 100 INJECTION, SOLUTION INTRAVENOUS; SUBCUTANEOUS at 13:21

## 2022-09-27 RX ADMIN — FUROSEMIDE 40 MG: 40 TABLET ORAL at 18:04

## 2022-09-27 RX ADMIN — INSULIN LISPRO 4 UNITS: 100 INJECTION, SOLUTION INTRAVENOUS; SUBCUTANEOUS at 18:03

## 2022-09-27 RX ADMIN — SENNOSIDES AND DOCUSATE SODIUM 2 TABLET: 50; 8.6 TABLET ORAL at 09:18

## 2022-09-27 RX ADMIN — POTASSIUM CHLORIDE 20 MEQ: 750 CAPSULE, EXTENDED RELEASE ORAL at 18:10

## 2022-09-27 RX ADMIN — INSULIN LISPRO 12 UNITS: 100 INJECTION, SOLUTION INTRAVENOUS; SUBCUTANEOUS at 18:05

## 2022-09-27 RX ADMIN — ACETAMINOPHEN 1000 MG: 500 TABLET, FILM COATED ORAL at 03:54

## 2022-09-27 RX ADMIN — ACETAMINOPHEN 1000 MG: 500 TABLET, FILM COATED ORAL at 21:40

## 2022-09-27 RX ADMIN — TRAZODONE HYDROCHLORIDE 50 MG: 50 TABLET ORAL at 21:43

## 2022-09-27 RX ADMIN — POTASSIUM CHLORIDE 20 MEQ: 750 CAPSULE, EXTENDED RELEASE ORAL at 09:18

## 2022-09-27 RX ADMIN — NADOLOL 20 MG: 20 TABLET ORAL at 10:48

## 2022-09-27 RX ADMIN — ACETAMINOPHEN 1000 MG: 500 TABLET, FILM COATED ORAL at 13:20

## 2022-09-27 RX ADMIN — SPIRONOLACTONE 50 MG: 50 TABLET ORAL at 10:49

## 2022-09-28 LAB
GLUCOSE BLDC GLUCOMTR-MCNC: 187 MG/DL (ref 70–130)
GLUCOSE BLDC GLUCOMTR-MCNC: 252 MG/DL (ref 70–130)
GLUCOSE BLDC GLUCOMTR-MCNC: 260 MG/DL (ref 70–130)
GLUCOSE BLDC GLUCOMTR-MCNC: 82 MG/DL (ref 70–130)

## 2022-09-28 PROCEDURE — 97116 GAIT TRAINING THERAPY: CPT

## 2022-09-28 PROCEDURE — 97530 THERAPEUTIC ACTIVITIES: CPT

## 2022-09-28 PROCEDURE — 63710000001 INSULIN LISPRO (HUMAN) PER 5 UNITS: Performed by: ORTHOPAEDIC SURGERY

## 2022-09-28 PROCEDURE — 99232 SBSQ HOSP IP/OBS MODERATE 35: CPT | Performed by: NURSE PRACTITIONER

## 2022-09-28 PROCEDURE — 63710000001 INSULIN LISPRO (HUMAN) PER 5 UNITS: Performed by: INTERNAL MEDICINE

## 2022-09-28 PROCEDURE — 82962 GLUCOSE BLOOD TEST: CPT

## 2022-09-28 PROCEDURE — 25010000002 DAPTOMYCIN PER 1 MG: Performed by: INTERNAL MEDICINE

## 2022-09-28 PROCEDURE — 97110 THERAPEUTIC EXERCISES: CPT | Performed by: OCCUPATIONAL THERAPIST

## 2022-09-28 PROCEDURE — 97535 SELF CARE MNGMENT TRAINING: CPT | Performed by: OCCUPATIONAL THERAPIST

## 2022-09-28 PROCEDURE — 63710000001 INSULIN DETEMIR PER 5 UNITS: Performed by: NURSE PRACTITIONER

## 2022-09-28 RX ORDER — HYDROXYZINE HYDROCHLORIDE 25 MG/1
25 TABLET, FILM COATED ORAL 3 TIMES DAILY PRN
Status: DISCONTINUED | OUTPATIENT
Start: 2022-09-28 | End: 2022-10-05

## 2022-09-28 RX ADMIN — DAPTOMYCIN 650 MG: 500 INJECTION, POWDER, LYOPHILIZED, FOR SOLUTION INTRAVENOUS at 08:36

## 2022-09-28 RX ADMIN — OXYCODONE 5 MG: 5 TABLET ORAL at 09:45

## 2022-09-28 RX ADMIN — Medication 10 ML: at 08:36

## 2022-09-28 RX ADMIN — Medication 10 ML: at 21:14

## 2022-09-28 RX ADMIN — INSULIN LISPRO 2 UNITS: 100 INJECTION, SOLUTION INTRAVENOUS; SUBCUTANEOUS at 12:25

## 2022-09-28 RX ADMIN — TRAZODONE HYDROCHLORIDE 50 MG: 50 TABLET ORAL at 21:13

## 2022-09-28 RX ADMIN — ACETAMINOPHEN 1000 MG: 500 TABLET, FILM COATED ORAL at 18:30

## 2022-09-28 RX ADMIN — ASPIRIN 81 MG: 81 TABLET, COATED ORAL at 08:36

## 2022-09-28 RX ADMIN — POTASSIUM CHLORIDE 20 MEQ: 750 CAPSULE, EXTENDED RELEASE ORAL at 17:12

## 2022-09-28 RX ADMIN — NADOLOL 20 MG: 20 TABLET ORAL at 09:45

## 2022-09-28 RX ADMIN — INSULIN LISPRO 12 UNITS: 100 INJECTION, SOLUTION INTRAVENOUS; SUBCUTANEOUS at 17:12

## 2022-09-28 RX ADMIN — INSULIN DETEMIR 18 UNITS: 100 INJECTION, SOLUTION SUBCUTANEOUS at 08:37

## 2022-09-28 RX ADMIN — ACETAMINOPHEN 1000 MG: 500 TABLET, FILM COATED ORAL at 02:57

## 2022-09-28 RX ADMIN — INSULIN LISPRO 12 UNITS: 100 INJECTION, SOLUTION INTRAVENOUS; SUBCUTANEOUS at 12:26

## 2022-09-28 RX ADMIN — SPIRONOLACTONE 50 MG: 50 TABLET ORAL at 09:45

## 2022-09-28 RX ADMIN — INSULIN LISPRO 6 UNITS: 100 INJECTION, SOLUTION INTRAVENOUS; SUBCUTANEOUS at 17:11

## 2022-09-28 RX ADMIN — FUROSEMIDE 40 MG: 40 TABLET ORAL at 08:36

## 2022-09-28 RX ADMIN — MULTIVITAMIN TABLET 1 TABLET: TABLET at 08:36

## 2022-09-28 RX ADMIN — FUROSEMIDE 40 MG: 40 TABLET ORAL at 17:12

## 2022-09-28 RX ADMIN — ACETAMINOPHEN 1000 MG: 500 TABLET, FILM COATED ORAL at 11:23

## 2022-09-28 RX ADMIN — INSULIN DETEMIR 16 UNITS: 100 INJECTION, SOLUTION SUBCUTANEOUS at 21:13

## 2022-09-28 RX ADMIN — POTASSIUM CHLORIDE 20 MEQ: 750 CAPSULE, EXTENDED RELEASE ORAL at 08:36

## 2022-09-28 RX ADMIN — ASPIRIN 81 MG: 81 TABLET, COATED ORAL at 21:13

## 2022-09-29 LAB
ALBUMIN SERPL-MCNC: 2.4 G/DL (ref 3.5–5.2)
ALBUMIN/GLOB SERPL: 0.8 G/DL
ALP SERPL-CCNC: 304 U/L (ref 39–117)
ALT SERPL W P-5'-P-CCNC: 34 U/L (ref 1–41)
ANION GAP SERPL CALCULATED.3IONS-SCNC: 5 MMOL/L (ref 5–15)
AST SERPL-CCNC: 61 U/L (ref 1–40)
BILIRUB SERPL-MCNC: 0.8 MG/DL (ref 0–1.2)
BUN SERPL-MCNC: 11 MG/DL (ref 6–20)
BUN/CREAT SERPL: 18.6 (ref 7–25)
CALCIUM SPEC-SCNC: 8.2 MG/DL (ref 8.6–10.5)
CHLORIDE SERPL-SCNC: 96 MMOL/L (ref 98–107)
CK SERPL-CCNC: 26 U/L (ref 20–200)
CO2 SERPL-SCNC: 29 MMOL/L (ref 22–29)
CREAT SERPL-MCNC: 0.59 MG/DL (ref 0.76–1.27)
DEPRECATED RDW RBC AUTO: 48.1 FL (ref 37–54)
EGFRCR SERPLBLD CKD-EPI 2021: 113.2 ML/MIN/1.73
ERYTHROCYTE [DISTWIDTH] IN BLOOD BY AUTOMATED COUNT: 17.5 % (ref 12.3–15.4)
GLOBULIN UR ELPH-MCNC: 3.2 GM/DL
GLUCOSE BLDC GLUCOMTR-MCNC: 143 MG/DL (ref 70–130)
GLUCOSE BLDC GLUCOMTR-MCNC: 187 MG/DL (ref 70–130)
GLUCOSE BLDC GLUCOMTR-MCNC: 246 MG/DL (ref 70–130)
GLUCOSE BLDC GLUCOMTR-MCNC: 261 MG/DL (ref 70–130)
GLUCOSE SERPL-MCNC: 232 MG/DL (ref 65–99)
HCT VFR BLD AUTO: 26.9 % (ref 37.5–51)
HGB BLD-MCNC: 8.6 G/DL (ref 13–17.7)
MCH RBC QN AUTO: 24.9 PG (ref 26.6–33)
MCHC RBC AUTO-ENTMCNC: 32 G/DL (ref 31.5–35.7)
MCV RBC AUTO: 77.7 FL (ref 79–97)
PLATELET # BLD AUTO: 199 10*3/MM3 (ref 140–450)
PMV BLD AUTO: 10.1 FL (ref 6–12)
POTASSIUM SERPL-SCNC: 4.6 MMOL/L (ref 3.5–5.2)
PROT SERPL-MCNC: 5.6 G/DL (ref 6–8.5)
RBC # BLD AUTO: 3.46 10*6/MM3 (ref 4.14–5.8)
SODIUM SERPL-SCNC: 130 MMOL/L (ref 136–145)
WBC NRBC COR # BLD: 6.09 10*3/MM3 (ref 3.4–10.8)

## 2022-09-29 PROCEDURE — 99232 SBSQ HOSP IP/OBS MODERATE 35: CPT | Performed by: NURSE PRACTITIONER

## 2022-09-29 PROCEDURE — G0108 DIAB MANAGE TRN  PER INDIV: HCPCS

## 2022-09-29 PROCEDURE — 25010000002 DAPTOMYCIN PER 1 MG: Performed by: INTERNAL MEDICINE

## 2022-09-29 PROCEDURE — 63710000001 INSULIN LISPRO (HUMAN) PER 5 UNITS: Performed by: NURSE PRACTITIONER

## 2022-09-29 PROCEDURE — 80053 COMPREHEN METABOLIC PANEL: CPT | Performed by: INTERNAL MEDICINE

## 2022-09-29 PROCEDURE — 63710000001 INSULIN DETEMIR PER 5 UNITS: Performed by: NURSE PRACTITIONER

## 2022-09-29 PROCEDURE — 85027 COMPLETE CBC AUTOMATED: CPT | Performed by: INTERNAL MEDICINE

## 2022-09-29 PROCEDURE — 82550 ASSAY OF CK (CPK): CPT | Performed by: INTERNAL MEDICINE

## 2022-09-29 PROCEDURE — 63710000001 INSULIN LISPRO (HUMAN) PER 5 UNITS: Performed by: ORTHOPAEDIC SURGERY

## 2022-09-29 PROCEDURE — 82962 GLUCOSE BLOOD TEST: CPT

## 2022-09-29 PROCEDURE — 63710000001 INSULIN LISPRO (HUMAN) PER 5 UNITS: Performed by: INTERNAL MEDICINE

## 2022-09-29 PROCEDURE — 97116 GAIT TRAINING THERAPY: CPT

## 2022-09-29 RX ORDER — INSULIN LISPRO 100 [IU]/ML
14 INJECTION, SOLUTION INTRAVENOUS; SUBCUTANEOUS
Status: DISCONTINUED | OUTPATIENT
Start: 2022-09-29 | End: 2022-10-01

## 2022-09-29 RX ADMIN — INSULIN LISPRO 14 UNITS: 100 INJECTION, SOLUTION INTRAVENOUS; SUBCUTANEOUS at 11:48

## 2022-09-29 RX ADMIN — INSULIN DETEMIR 20 UNITS: 100 INJECTION, SOLUTION SUBCUTANEOUS at 08:15

## 2022-09-29 RX ADMIN — ACETAMINOPHEN 1000 MG: 500 TABLET, FILM COATED ORAL at 04:03

## 2022-09-29 RX ADMIN — Medication 10 ML: at 20:39

## 2022-09-29 RX ADMIN — TRAZODONE HYDROCHLORIDE 50 MG: 50 TABLET ORAL at 20:38

## 2022-09-29 RX ADMIN — SPIRONOLACTONE 50 MG: 50 TABLET ORAL at 08:48

## 2022-09-29 RX ADMIN — INSULIN LISPRO 2 UNITS: 100 INJECTION, SOLUTION INTRAVENOUS; SUBCUTANEOUS at 08:13

## 2022-09-29 RX ADMIN — Medication 10 ML: at 08:17

## 2022-09-29 RX ADMIN — ASPIRIN 81 MG: 81 TABLET, COATED ORAL at 20:38

## 2022-09-29 RX ADMIN — NADOLOL 20 MG: 20 TABLET ORAL at 08:48

## 2022-09-29 RX ADMIN — INSULIN DETEMIR 16 UNITS: 100 INJECTION, SOLUTION SUBCUTANEOUS at 20:38

## 2022-09-29 RX ADMIN — FUROSEMIDE 40 MG: 40 TABLET ORAL at 18:03

## 2022-09-29 RX ADMIN — ACETAMINOPHEN 1000 MG: 500 TABLET, FILM COATED ORAL at 11:48

## 2022-09-29 RX ADMIN — INSULIN LISPRO 12 UNITS: 100 INJECTION, SOLUTION INTRAVENOUS; SUBCUTANEOUS at 08:15

## 2022-09-29 RX ADMIN — MULTIVITAMIN TABLET 1 TABLET: TABLET at 08:13

## 2022-09-29 RX ADMIN — DAPTOMYCIN 650 MG: 500 INJECTION, POWDER, LYOPHILIZED, FOR SOLUTION INTRAVENOUS at 08:22

## 2022-09-29 RX ADMIN — OXYCODONE 5 MG: 5 TABLET ORAL at 09:56

## 2022-09-29 RX ADMIN — POTASSIUM CHLORIDE 20 MEQ: 750 CAPSULE, EXTENDED RELEASE ORAL at 08:13

## 2022-09-29 RX ADMIN — INSULIN LISPRO 14 UNITS: 100 INJECTION, SOLUTION INTRAVENOUS; SUBCUTANEOUS at 18:03

## 2022-09-29 RX ADMIN — ASPIRIN 81 MG: 81 TABLET, COATED ORAL at 08:13

## 2022-09-29 RX ADMIN — ACETAMINOPHEN 1000 MG: 500 TABLET, FILM COATED ORAL at 18:03

## 2022-09-29 RX ADMIN — FUROSEMIDE 40 MG: 40 TABLET ORAL at 08:13

## 2022-09-29 RX ADMIN — INSULIN LISPRO 4 UNITS: 100 INJECTION, SOLUTION INTRAVENOUS; SUBCUTANEOUS at 11:48

## 2022-09-29 RX ADMIN — POTASSIUM CHLORIDE 20 MEQ: 750 CAPSULE, EXTENDED RELEASE ORAL at 18:03

## 2022-09-30 LAB
GLUCOSE BLDC GLUCOMTR-MCNC: 174 MG/DL (ref 70–130)
GLUCOSE BLDC GLUCOMTR-MCNC: 218 MG/DL (ref 70–130)
GLUCOSE BLDC GLUCOMTR-MCNC: 222 MG/DL (ref 70–130)
GLUCOSE BLDC GLUCOMTR-MCNC: 229 MG/DL (ref 70–130)

## 2022-09-30 PROCEDURE — 63710000001 INSULIN LISPRO (HUMAN) PER 5 UNITS: Performed by: NURSE PRACTITIONER

## 2022-09-30 PROCEDURE — 63710000001 INSULIN LISPRO (HUMAN) PER 5 UNITS: Performed by: ORTHOPAEDIC SURGERY

## 2022-09-30 PROCEDURE — 25010000002 DAPTOMYCIN PER 1 MG: Performed by: INTERNAL MEDICINE

## 2022-09-30 PROCEDURE — 82962 GLUCOSE BLOOD TEST: CPT

## 2022-09-30 PROCEDURE — 97116 GAIT TRAINING THERAPY: CPT

## 2022-09-30 PROCEDURE — 97530 THERAPEUTIC ACTIVITIES: CPT

## 2022-09-30 PROCEDURE — 63710000001 INSULIN DETEMIR PER 5 UNITS: Performed by: NURSE PRACTITIONER

## 2022-09-30 PROCEDURE — 99232 SBSQ HOSP IP/OBS MODERATE 35: CPT | Performed by: PEDIATRICS

## 2022-09-30 RX ADMIN — TRAZODONE HYDROCHLORIDE 50 MG: 50 TABLET ORAL at 21:13

## 2022-09-30 RX ADMIN — ASPIRIN 81 MG: 81 TABLET, COATED ORAL at 08:04

## 2022-09-30 RX ADMIN — ACETAMINOPHEN 1000 MG: 500 TABLET, FILM COATED ORAL at 02:59

## 2022-09-30 RX ADMIN — POTASSIUM CHLORIDE 20 MEQ: 750 CAPSULE, EXTENDED RELEASE ORAL at 18:03

## 2022-09-30 RX ADMIN — INSULIN LISPRO 2 UNITS: 100 INJECTION, SOLUTION INTRAVENOUS; SUBCUTANEOUS at 08:05

## 2022-09-30 RX ADMIN — ASPIRIN 81 MG: 81 TABLET, COATED ORAL at 21:13

## 2022-09-30 RX ADMIN — NADOLOL 20 MG: 20 TABLET ORAL at 12:06

## 2022-09-30 RX ADMIN — SPIRONOLACTONE 50 MG: 50 TABLET ORAL at 08:15

## 2022-09-30 RX ADMIN — MULTIVITAMIN TABLET 1 TABLET: TABLET at 08:04

## 2022-09-30 RX ADMIN — INSULIN DETEMIR 16 UNITS: 100 INJECTION, SOLUTION SUBCUTANEOUS at 21:13

## 2022-09-30 RX ADMIN — FUROSEMIDE 40 MG: 40 TABLET ORAL at 18:03

## 2022-09-30 RX ADMIN — ACETAMINOPHEN 1000 MG: 500 TABLET, FILM COATED ORAL at 10:47

## 2022-09-30 RX ADMIN — INSULIN LISPRO 4 UNITS: 100 INJECTION, SOLUTION INTRAVENOUS; SUBCUTANEOUS at 12:06

## 2022-09-30 RX ADMIN — POTASSIUM CHLORIDE 20 MEQ: 750 CAPSULE, EXTENDED RELEASE ORAL at 08:05

## 2022-09-30 RX ADMIN — INSULIN LISPRO 14 UNITS: 100 INJECTION, SOLUTION INTRAVENOUS; SUBCUTANEOUS at 12:07

## 2022-09-30 RX ADMIN — OXYCODONE 10 MG: 5 TABLET ORAL at 10:47

## 2022-09-30 RX ADMIN — INSULIN LISPRO 4 UNITS: 100 INJECTION, SOLUTION INTRAVENOUS; SUBCUTANEOUS at 18:02

## 2022-09-30 RX ADMIN — DAPTOMYCIN 650 MG: 500 INJECTION, POWDER, LYOPHILIZED, FOR SOLUTION INTRAVENOUS at 08:05

## 2022-09-30 RX ADMIN — FUROSEMIDE 40 MG: 40 TABLET ORAL at 08:04

## 2022-09-30 RX ADMIN — ACETAMINOPHEN 1000 MG: 500 TABLET, FILM COATED ORAL at 18:03

## 2022-09-30 RX ADMIN — INSULIN LISPRO 14 UNITS: 100 INJECTION, SOLUTION INTRAVENOUS; SUBCUTANEOUS at 18:03

## 2022-09-30 RX ADMIN — INSULIN LISPRO 14 UNITS: 100 INJECTION, SOLUTION INTRAVENOUS; SUBCUTANEOUS at 08:07

## 2022-09-30 RX ADMIN — Medication 10 ML: at 08:08

## 2022-09-30 RX ADMIN — INSULIN DETEMIR 20 UNITS: 100 INJECTION, SOLUTION SUBCUTANEOUS at 08:06

## 2022-10-01 LAB
BACTERIA SPEC AEROBE CULT: NORMAL
BACTERIA SPEC AEROBE CULT: NORMAL
BACTERIA SPEC ANAEROBE CULT: NORMAL
GLUCOSE BLDC GLUCOMTR-MCNC: 197 MG/DL (ref 70–130)
GLUCOSE BLDC GLUCOMTR-MCNC: 199 MG/DL (ref 70–130)
GLUCOSE BLDC GLUCOMTR-MCNC: 238 MG/DL (ref 70–130)

## 2022-10-01 PROCEDURE — 97116 GAIT TRAINING THERAPY: CPT

## 2022-10-01 PROCEDURE — 82962 GLUCOSE BLOOD TEST: CPT

## 2022-10-01 PROCEDURE — 63710000001 INSULIN LISPRO (HUMAN) PER 5 UNITS: Performed by: ORTHOPAEDIC SURGERY

## 2022-10-01 PROCEDURE — 99232 SBSQ HOSP IP/OBS MODERATE 35: CPT | Performed by: NURSE PRACTITIONER

## 2022-10-01 PROCEDURE — 63710000001 INSULIN DETEMIR PER 5 UNITS: Performed by: NURSE PRACTITIONER

## 2022-10-01 PROCEDURE — 63710000001 INSULIN LISPRO (HUMAN) PER 5 UNITS: Performed by: NURSE PRACTITIONER

## 2022-10-01 PROCEDURE — 25010000002 DAPTOMYCIN PER 1 MG: Performed by: INTERNAL MEDICINE

## 2022-10-01 RX ORDER — INSULIN LISPRO 100 [IU]/ML
18 INJECTION, SOLUTION INTRAVENOUS; SUBCUTANEOUS
Status: DISCONTINUED | OUTPATIENT
Start: 2022-10-01 | End: 2022-10-04

## 2022-10-01 RX ADMIN — Medication 10 ML: at 09:11

## 2022-10-01 RX ADMIN — OXYCODONE 5 MG: 5 TABLET ORAL at 15:10

## 2022-10-01 RX ADMIN — FUROSEMIDE 40 MG: 40 TABLET ORAL at 09:09

## 2022-10-01 RX ADMIN — POTASSIUM CHLORIDE 20 MEQ: 750 CAPSULE, EXTENDED RELEASE ORAL at 09:09

## 2022-10-01 RX ADMIN — ACETAMINOPHEN 1000 MG: 500 TABLET, FILM COATED ORAL at 03:45

## 2022-10-01 RX ADMIN — POTASSIUM CHLORIDE 20 MEQ: 750 CAPSULE, EXTENDED RELEASE ORAL at 17:01

## 2022-10-01 RX ADMIN — INSULIN LISPRO 14 UNITS: 100 INJECTION, SOLUTION INTRAVENOUS; SUBCUTANEOUS at 09:08

## 2022-10-01 RX ADMIN — INSULIN LISPRO 18 UNITS: 100 INJECTION, SOLUTION INTRAVENOUS; SUBCUTANEOUS at 17:02

## 2022-10-01 RX ADMIN — ASPIRIN 81 MG: 81 TABLET, COATED ORAL at 09:09

## 2022-10-01 RX ADMIN — SPIRONOLACTONE 50 MG: 50 TABLET ORAL at 09:09

## 2022-10-01 RX ADMIN — ACETAMINOPHEN 1000 MG: 500 TABLET, FILM COATED ORAL at 12:20

## 2022-10-01 RX ADMIN — ASPIRIN 81 MG: 81 TABLET, COATED ORAL at 20:35

## 2022-10-01 RX ADMIN — MULTIVITAMIN TABLET 1 TABLET: TABLET at 09:09

## 2022-10-01 RX ADMIN — INSULIN LISPRO 2 UNITS: 100 INJECTION, SOLUTION INTRAVENOUS; SUBCUTANEOUS at 17:01

## 2022-10-01 RX ADMIN — INSULIN DETEMIR 20 UNITS: 100 INJECTION, SOLUTION SUBCUTANEOUS at 09:07

## 2022-10-01 RX ADMIN — NADOLOL 20 MG: 20 TABLET ORAL at 09:09

## 2022-10-01 RX ADMIN — INSULIN LISPRO 18 UNITS: 100 INJECTION, SOLUTION INTRAVENOUS; SUBCUTANEOUS at 12:21

## 2022-10-01 RX ADMIN — INSULIN DETEMIR 20 UNITS: 100 INJECTION, SOLUTION SUBCUTANEOUS at 20:35

## 2022-10-01 RX ADMIN — TRAZODONE HYDROCHLORIDE 50 MG: 50 TABLET ORAL at 20:35

## 2022-10-01 RX ADMIN — DAPTOMYCIN 650 MG: 500 INJECTION, POWDER, LYOPHILIZED, FOR SOLUTION INTRAVENOUS at 09:07

## 2022-10-01 RX ADMIN — SODIUM CHLORIDE, POTASSIUM CHLORIDE, SODIUM LACTATE AND CALCIUM CHLORIDE 500 ML: 600; 310; 30; 20 INJECTION, SOLUTION INTRAVENOUS at 14:27

## 2022-10-01 RX ADMIN — INSULIN LISPRO 2 UNITS: 100 INJECTION, SOLUTION INTRAVENOUS; SUBCUTANEOUS at 09:08

## 2022-10-01 RX ADMIN — FUROSEMIDE 40 MG: 40 TABLET ORAL at 17:01

## 2022-10-01 RX ADMIN — ACETAMINOPHEN 1000 MG: 500 TABLET, FILM COATED ORAL at 17:05

## 2022-10-01 RX ADMIN — INSULIN LISPRO 4 UNITS: 100 INJECTION, SOLUTION INTRAVENOUS; SUBCUTANEOUS at 12:21

## 2022-10-01 NOTE — PROGRESS NOTES
[facility] Progress Note        Antibiotics:  Anti-Infectives (From admission, onward)    Ordered     Dose/Rate Route Frequency Start Stop    09/25/22 0753  DAPTOmycin (CUBICIN) 650 mg in sodium chloride 0.9 % 50 mL IVPB        Ordering Provider: Bryce Euceda MD    8 mg/kg × 83 kg (Adjusted)  100 mL/hr over 30 Minutes Intravenous Every 24 Hours 09/25/22 0900 11/06/22 0859    09/21/22 1358  ceFAZolin in dextrose (ANCEF) IVPB solution 2 g        Ordering Provider: Brain Bentley MD    2 g  over 30 Minutes Intravenous Once 09/21/22 1445 09/21/22 1512    09/21/22 0204  vancomycin 2000 mg/500 mL 0.9% NS IVPB ([facility])        Ordering Provider: Ronen Payan DO    2,000 mg  over 120 Minutes Intravenous Once 09/21/22 0300 09/21/22 0554          CC: fatigue    HPI:      Patient is a 57 y.o.  Yr old male with history cirrhosis/diabetes and other comorbidity as outlined below.  History of left foot gas gangrene with osteomyelitis and MRSA bacteremia treated in [place] by Dr. Giordano in May/June 2022.  Family reports left transmetatarsal amputation in approximately 8 weeks IV vancomycin.  Notes from [place] indicate transthoracic echocardiogram no vegetation per Dr. Giordano; he thinks he might of had several weeks of oral antibiotic after that but not entirely clear.  He is admitted to [facility] September 20 with progressive right knee pain occurring over the past week preceding admission.  He thinks he might of twisted it but ended up with progressive redness/swelling and pain.  No specific blunt force or penetrating trauma.  No animal insect arthropod bite.  No open wounds or active drainage.  No prior history of VRE C. difficile or ESBL/K PC/CRE organisms; he was evaluated by orthopedics and taken to the operating room for right knee incision/drainage and concern for septic arthritis per Dr. Bentley; blood cultures have shown gram-positive cocci and initial PCR data suggesting MRSA.  Had dysuria but urinalysis  not consistent with UTI.  No hematuria or pyuria    9/23 with TV echodensity     9/26/22  TYRESE no vegetation per cardiology    10/1/22  Seen early and sleepy;  Case management working on options; no oxygen;  Per nursing,  no new focal pain; postop Right knee pain is  dull at present,  Better controlled per nursing,   nonradiating, worse with movement, better with pain meds and 2  out of 10 in severity at present; he denies any other focal musculoskeletal pain.  No back pain. No myalgia     Left foot with no redness/swelling or pain.  Surgical site healed with no open wound or active drainage.     No headache photophobia or neck stiffness.  No nausea vomiting diarrhea or abdominal pain.  No shortness of breath cough or hemoptysis.  No other new skin rash.  No other recent procedures or interventions.  No indwelling prosthetic material otherwise.  No indwelling pacemaker chronic lines       ROS:      10/1/22 per nursing; No f/c/s. No n/v/d. No rash. No new ADR to Abx.       Constitutional-- No Fever, chills or sweats.  Appetite diminished with fatigue.  Heent-- No new vision, hearing or throat complaints.  No epistaxis or oral sores.  Denies odynophagia or dysphagia.  No flashers, floaters or eye pain.   No headache, photophobia or neck stiffness.  CV-- No chest pain, palpitation or syncope  Resp-- No SOB/cough/Hemoptysis  GI- No nausea, vomiting, or diarrhea.  No hematochezia, melena, or hematemesis. Denies jaundice  -- No dysuria, hematuria, or flank pain.  Denies hesitancy, urgency or flank pain.  Lymph- no swollen lymph nodes in neck/axilla or groin.   Heme- No active bruising or bleeding; no Hx of DVT or PE.  MS-- no swelling or pain in the bones or joints of arms/legs.  No new back pain.  Neuro-- No acute focal weakness or numbness in the arms or legs.  No seizures.     Full 12 point review of systems reviewed and negative otherwise for acute complaints, except for above       PE:   /58   Pulse 72    "Temp 98.1 °F (36.7 °C) (Oral)   Resp 15   Ht 177.8 cm (70\")   Wt 98 kg (216 lb)   SpO2 97%   BMI 30.99 kg/m²       GENERAL: awake, in no acute distress.  Chronically ill-appearing  HEENT: Normocephalic, atraumatic.   No conjunctival injection. No icterus. Oropharynx clear without evidence of thrush or exudate. No evidence of peridontal disease.    NECK: Supple without nuchal rigidity. No mass.   HEART: RRR; soft murmur LSB, rubs, gallops.   LUNGS: Diminished at bases but otherwise clear to auscultation bilaterally without wheezing, rales, rhonchi. Normal respiratory effort. Nonlabored. No dullness.  ABDOMEN: Soft, nontender, nondistended. Positive bowel sounds. No rebound or guarding. NO mass or HSM.  EXT:  No cyanosis, clubbing or edema. No cord.   MSK: FROM without joint effusions noted arms/legs.    SKIN: Warm and dry without cutaneous eruptions on Inspection/palpation.    NEURO: sleepy     Left foot with no redness, induration or warmth.  No discrete mass bulge or fluctuance.  No crepitus or bulla.  Prior surgical site healed at [facility].  No open wound or active drainage; no tenderness     Right knee postop surgical site covered.  Exposed skin without obvious redness induration or warmth.  No discrete mass bulge or fluctuance.  No crepitus or bulla.     No peripheral stigmata/phenomena of endocarditis     IV without obvious redness or drainage    Laboratory Data    Results from last 7 days   Lab Units 09/29/22  1034 09/27/22  0412 09/25/22  1030   WBC 10*3/mm3 6.09 6.67 6.20   HEMOGLOBIN g/dL 8.6* 8.2* 7.9*   HEMATOCRIT % 26.9* 25.3* 24.7*   PLATELETS 10*3/mm3 199 158 123*     Results from last 7 days   Lab Units 09/29/22  1034   SODIUM mmol/L 130*   POTASSIUM mmol/L 4.6   CHLORIDE mmol/L 96*   CO2 mmol/L 29.0   BUN mg/dL 11   CREATININE mg/dL 0.59*   GLUCOSE mg/dL 232*   CALCIUM mg/dL 8.2*     Results from last 7 days   Lab Units 09/29/22  1034   ALK PHOS U/L 304*   BILIRUBIN mg/dL 0.8   ALT (SGPT) U/L 34 "   AST (SGOT) U/L 61*               Estimated Creatinine Clearance: 162.2 mL/min (A) (by C-G formula based on SCr of 0.59 mg/dL (L)).      Microbiology:      Radiology:  Imaging Results (Last 72 Hours)     ** No results found for the last 72 hours. **            Impression:         --Acute MRSA bacteremia/septicemia, recurrent with prior history positive cultures May 2022 and recurrent despite prior 8 weeks of IV vancomycin and left foot surgery in addition to other supportive measures.   TTE with ? TV echodensity;   IV daptomycin 8 mg/kg initiated with pharmacy assisting with dosing.  High risk for further serious morbidity and other serious sequela including persistent/progressive or recurrent infection, metastatic foci of involvement and other dire consequences. TYRESE NO VEGETATION per cardiology; at risk for occult endovascular focus unable to confirm so far.  Nonetheless, given circumstances he will receive ongoing treatment empirically for potential occult endovascular focus     --Acute right knee pain/septic arthritis with surgical intervention, incision/debridement by Dr. Bentley on September 21.   MRSA+ in the setting of MRSA bacteremia.     --History of cirrhosis by records.  Unclear etiology.   further GI referral/eval per  medicine team     --Diabetes.  You need to tightly control blood sugar to give best chance for healing.;  Described as uncontrolled by medicine team at admission    --TCP better     PLAN:       -- IV daptomycin likely at least 6 weeks, potentially step down to oral abx after that depending on clinical course     -- Check/review labs cultures and scans     -- Partial history per nursing staff       -- Highly complex set of issues with high risk for further serious morbidity and other serious sequela    --TYRESE noted; consider repeat TTE 2-4 weeks to evaluate for any evolving change;  Sooner if clinically worse    --anticipate rehab    -- case management looking into options    Bryce FIELDS  MD Ok  10/1/2022

## 2022-10-01 NOTE — THERAPY TREATMENT NOTE
Patient Name: Melchor Hardwick III  : 1965    MRN: [id number]                              Today's Date: 10/1/2022       Admit Date: 2022    Visit Dx:     ICD-10-CM ICD-9-CM   1. Pyogenic arthritis of right knee joint, due to unspecified organism (HCC)  M00.9 711.06   2. Right knee pain, unspecified chronicity  M25.561 719.46   3. Generalized weakness  R53.1 780.79   4. Hyperglycemia  R73.9 790.29   5. Pyogenic arthritis of right knee joint (HCC)  M00.9 711.06     Patient Active Problem List   Diagnosis   • Hyperlipidemia   • Hypertensive disorder   • Obesity   • Type 2 diabetes mellitus (HCC)   • Gas gangrene of foot (HCC)   • Cellulitis in diabetic foot (HCC)   • Other acute osteomyelitis of left foot (HCC)   • Osteomyelitis (HCC)   • Critical illness myopathy   • Pyogenic arthritis of right knee joint, due to unspecified organism (HCC)   • Cirrhosis of liver (HCC)   • MRSA bacteremia   • Endocarditis of tricuspid valve     Past Medical History:   Diagnosis Date   • Diabetes mellitus (HCC)    • Hyperlipidemia    • Hypertension    • Pyogenic arthritis of right knee joint, due to unspecified organism (HCC) 2022     Past Surgical History:   Procedure Laterality Date   • ABSCESS DRAINAGE      PERINEUM   • AMPUTATION FOOT Left 2022    Procedure: AMPUTATION FOOT;  Surgeon: Addison Colby MD;  [facility];  Service: Podiatry;  Laterality: Left;   • INCISION AND DRAINAGE LEG Left 05/10/2022    Procedure: INCISION AND DRAINAGE LOWER EXTREMITY;  Surgeon: Addison Colby MD;  Location: [facility] OR;  Service: Podiatry;  Laterality: Left;   • KNEE INCISION AND DRAINAGE Right 2022    Procedure: KNEE INCISION AND DRAINAGE RIGHT;  Surgeon: Brain Bentley MD;  Location: [facility] OR;  Service: Orthopedics;  Laterality: Right;   • WOUND DEBRIDEMENT Left 2022    Procedure: DEBRIDEMENT FOOT;  Surgeon: Addison Colby MD;  Location: [facility] OR;  Service: Podiatry;  Laterality: Left;       General Information     Row Name 10/01/22 1422          Physical Therapy Time and Intention    Document Type therapy note (daily note)  -FW     Mode of Treatment physical therapy  -     Row Name 10/01/22 1422          General Information    Patient Profile Reviewed yes  -FW     Existing Precautions/Restrictions fall;brace on at all times;other (see comments)  R Knee with KI on, NO ROM, L foot s/p amputation -use boot  -     Row Name 10/01/22 1422          Cognition    Orientation Status (Cognition) oriented x 4  -     Row Name 10/01/22 1422          Safety Issues, Functional Mobility    Impairments Affecting Function (Mobility) strength;balance;pain;endurance/activity tolerance;postural/trunk control;range of motion (ROM);sensation/sensory awareness  -           User Key  (r) = Recorded By, (t) = Taken By, (c) = Cosigned By    Initials Name Provider Type     Artur Quinones PT Physical Therapist               Mobility     Row Name 10/01/22 1423          Bed Mobility    Bed Mobility rolling right;sidelying-sit  -FW     Rolling Right [employer] [employer] (Bed Mobility) standby assist  -     Sidelying-Sit [employer] (Bed Mobility) minimum assist (75% patient effort)  -     Assistive Device (Bed Mobility) bed rails;head of bed elevated;draw sheet  -     Row Name 10/01/22 1423          Sit-Stand Transfer    Sit-Stand [employer] [employer] (Transfers) minimum assist (75% patient effort)  -     Assistive Device (Sit-Stand Transfers) walker, front-wheeled  -     Row Name 10/01/22 1423          Gait/Stairs (Locomotion)    [employer] [employer] Level (Gait) verbal cues;contact guard  -     Assistive Device (Gait) walker, front-wheeled  -     Distance in Feet (Gait) 36  -FW     Deviations/Abnormal Patterns (Gait) right sided deviations;antalgic;stride length decreased;weight shifting decreased  -FW     Bilateral Gait Deviations forward flexed posture;heel strike decreased  -FW     Right Sided Gait Deviations weight shift  ability decreased  -     Comment, (Gait/Stairs) 36' w/ chair follow; pt with complaints of dizziness and requested to sit- c/o dizziness did not improve with seated rest BP taken twice  -     Row Name 10/01/22 1423          Mobility    Extremity Weight-bearing Status left lower extremity;right lower extremity  -FW     Left Lower Extremity (Weight-bearing Status) weight-bearing as tolerated (WBAT)  -FW     Right Lower Extremity (Weight-bearing Status) weight-bearing as tolerated (WBAT);other (see comments)  -           User Key  (r) = Recorded By, (t) = Taken By, (c) = Cosigned By    Initials Name Provider Type     Artur Quinones, JANNETH Physical Therapist               Obj/Interventions     Row Name 10/01/22 1425          Motor Skills    Therapeutic Exercise --  exercise deferred due to c/o dizziness  -     Row Name 10/01/22 1425          Balance    Balance Assessment sitting static balance;sitting dynamic balance;standing static balance;standing dynamic balance  -     Static Sitting Balance supervision  -     Dynamic Sitting Balance standby assist  -FW     Position, Sitting Balance unsupported;sitting in chair;sitting edge of bed  -     Static Standing Balance contact guard  -     Dynamic Standing Balance contact guard  -FW     Position/Device Used, Standing Balance supported;walker, front-wheeled  -           User Key  (r) = Recorded By, (t) = Taken By, (c) = Cosigned By    Initials Name Provider Type     Artur Quinones, PT Physical Therapist               Goals/Plan    No documentation.                Clinical Impression     Row Name 10/01/22 1427          Pain    Pretreatment Pain Rating 0/10 - no pain  -FW     Posttreatment Pain Rating 4/10  -FW     Pain Location - Side/Orientation Right  -FW     Pain Location - knee  -FW     Pain Intervention(s) Repositioned;Ambulation/increased activity  -     Row Name 10/01/22 1425          Plan of Care Review    Plan of Care Reviewed With patient   -FW     Outcome Evaluation Pt performed bed mobility min A, STS min A, and ambulated 36' CGA w/ a RW. Pt with complaints of dizziness and returned to recliner- BP checked twice with low readings. Notified NSG of findings.  -FW     Row Name 10/01/22 1427          Vital Signs    Intra Systolic BP Rehab 77  -FW     Intra Treatment Diastolic BP 42  -FW     Post Systolic BP Rehab 81  -FW     Post Treatment Diastolic BP 40  -FW     O2 Delivery Pre Treatment room air  -FW     O2 Delivery Intra Treatment room air  -FW     O2 Delivery Post Treatment room air  -FW     Pre Patient Position Supine  -FW     Intra Patient Position Standing  -FW     Post Patient Position Sitting  -FW     Row Name 10/01/22 1427          Positioning and Restraints    Pre-Treatment Position in bed  -FW     Post Treatment Position chair  -FW     In Chair notified nsg;reclined;sitting;call light within reach;encouraged to call for assist;exit alarm on;legs elevated  -FW           User Key  (r) = Recorded By, (t) = Taken By, (c) = Cosigned By    Initials Name Provider Type    Artur Sheikh PT Physical Therapist               Outcome Measures     Row Name 10/01/22 1429          How much help from another person do you currently need...    Turning from your back to your side while in flat bed without using bedrails? 3  -FW     Moving to and from a bed to a chair (including a wheelchair)? 3  -FW     Standing up from a chair using your arms (e.g., wheelchair, bedside chair)? 3  -FW     Climbing 3-5 steps with a railing? 1  -FW     To walk in hospital room? 3  -FW     Row Name 10/01/22 1429          Functional Assessment    Outcome Measure Options AM-PAC 6 Clicks Basic Mobility (PT)  -FW           User Key  (r) = Recorded By, (t) = Taken By, (c) = Cosigned By    Initials Name Provider Type    Artur Sheikh PT Physical Therapist                             Physical Therapy Education                 Title: PT OT SLP Therapies (Done)      Topic: Physical Therapy (Done)     Point: Mobility training (Done)     Learning Progress Summary           Patient Acceptance, E, VU by  at 10/1/2022 1429    Eager, E, VU by SC at 9/29/2022 1156    Comment: reviewed benefits of activity    Eager, E, VU by SC at 9/27/2022 1316    Comment: reviewed benefits of activity    Acceptance, E,D, VU,NR by LR at 9/26/2022 1128    Comment: Educated on precautions, weight bearing status, safety with mobility, correct sit<->stand t/f technique, correct gait mechanics, HEP, and progression of POC.    Acceptance, E,D, VU,NR by LR at 9/25/2022 1110    Comment: Educated on precautions, weight bearing status, safety with mobility, correct supine to sit t/f technique, correct sit<->stand t/f technique, correct gait mechanics, HEP, and progression of POC.    Acceptance, E,D, VU,NR by LR at 9/24/2022 1523    Comment: Educated on precautions, weight bearing status, HEP, safety with mobility, correct supine to sit t/f technique, correct sit<->stand t/f technique, correct gait mechanics, and progression of POC.    Acceptance, E, VU by LG at 9/24/2022 0545    Acceptance, E, VU by LG at 9/23/2022 0531    Acceptance, E,D, VU,NR by MB at 9/22/2022 1350                   Point: Home exercise program (Done)     Learning Progress Summary           Patient Eager, E, VU by SC at 9/29/2022 1156    Comment: reviewed benefits of activity    Eager, E, VU by SC at 9/27/2022 1316    Comment: reviewed benefits of activity    Acceptance, E,D, VU,NR by LR at 9/26/2022 1128    Comment: Educated on precautions, weight bearing status, safety with mobility, correct sit<->stand t/f technique, correct gait mechanics, HEP, and progression of POC.    Acceptance, E,D, VU,NR by LR at 9/25/2022 1110    Comment: Educated on precautions, weight bearing status, safety with mobility, correct supine to sit t/f technique, correct sit<->stand t/f technique, correct gait mechanics, HEP, and progression of POC.    Acceptance,  E,D, VU,NR by LR at 9/24/2022 1523    Comment: Educated on precautions, weight bearing status, HEP, safety with mobility, correct supine to sit t/f technique, correct sit<->stand t/f technique, correct gait mechanics, and progression of POC.    Acceptance, E, VU by LG at 9/24/2022 0545    Acceptance, E, VU by LG at 9/23/2022 0531    Acceptance, E,D, VU,NR by MB at 9/22/2022 1350                   Point: Body mechanics (Done)     Learning Progress Summary           Patient Acceptance, E, VU by FW at 10/1/2022 1429    Eager, E, VU by SC at 9/29/2022 1156    Comment: reviewed benefits of activity    Eager, E, VU by SC at 9/27/2022 1316    Comment: reviewed benefits of activity    Acceptance, E,D, VU,NR by LR at 9/26/2022 1128    Comment: Educated on precautions, weight bearing status, safety with mobility, correct sit<->stand t/f technique, correct gait mechanics, HEP, and progression of POC.    Acceptance, E,D, VU,NR by LR at 9/25/2022 1110    Comment: Educated on precautions, weight bearing status, safety with mobility, correct supine to sit t/f technique, correct sit<->stand t/f technique, correct gait mechanics, HEP, and progression of POC.    Acceptance, E,D, VU,NR by LR at 9/24/2022 1523    Comment: Educated on precautions, weight bearing status, HEP, safety with mobility, correct supine to sit t/f technique, correct sit<->stand t/f technique, correct gait mechanics, and progression of POC.    Acceptance, E, VU by LG at 9/24/2022 0545    Acceptance, E, VU by LG at 9/23/2022 0531    Acceptance, E,D, VU,NR by MB at 9/22/2022 1350                   Point: Precautions (Done)     Learning Progress Summary           Patient Acceptance, E, VU by FW at 10/1/2022 1429    Eager, E, VU by SC at 9/29/2022 1156    Comment: reviewed benefits of activity    Eager, E, VU by SC at 9/27/2022 1316    Comment: reviewed benefits of activity    Acceptance, E,D, VU,NR by LR at 9/26/2022 1128    Comment: Educated on precautions, weight  bearing status, safety with mobility, correct sit<->stand t/f technique, correct gait mechanics, HEP, and progression of POC.    Acceptance, E,D, VU,NR by LR at 9/25/2022 1110    Comment: Educated on precautions, weight bearing status, safety with mobility, correct supine to sit t/f technique, correct sit<->stand t/f technique, correct gait mechanics, HEP, and progression of POC.    Acceptance, E,D, VU,NR by LR at 9/24/2022 1523    Comment: Educated on precautions, weight bearing status, HEP, safety with mobility, correct supine to sit t/f technique, correct sit<->stand t/f technique, correct gait mechanics, and progression of POC.    Acceptance, E, VU by LG at 9/24/2022 0545    Acceptance, E, VU by LG at 9/23/2022 0531    Acceptance, E,D, VU,NR by MB at 9/22/2022 1350                               User Key     Initials Effective Dates Name Provider Type Discipline    SC 06/16/21 -  Orion Reyes, PT Physical Therapist PT     06/16/21 -  Modesta Engel, PT Physical Therapist PT    MB 06/16/21 -  Leatha Tobin, PT Physical Therapist PT     11/16/18 -  Jak De La Rosa, RN Registered Nurse Nurse     05/05/22 -  Artur Quinones, PT Physical Therapist PT              PT Recommendation and Plan     Plan of Care Reviewed With: patient  Outcome Evaluation: Pt performed bed mobility min A, STS min A, and ambulated 36' CGA w/ a RW. Pt with complaints of dizziness and returned to recliner- BP checked twice with low readings. Notified NSG of findings.     Time Calculation:    PT Charges     Row Name 10/01/22 1430             Time Calculation    Start Time 1401  -FW      PT Received On 10/01/22  -FW      PT Goal Re-Cert Due Date 10/02/22  -FW              Timed Charges    09203 - Gait Training Minutes  15  -FW              Total Minutes    Timed Charges Total Minutes 15  -FW       Total Minutes 15  -FW            User Key  (r) = Recorded By, (t) = Taken By, (c) = Cosigned By    Initials Name  Provider Type    FW Artur Quinones, PT Physical Therapist              Therapy Charges for Today     Code Description Service Date Service Provider Modifiers Qty    [id number] HC GAIT TRAINING EA 15 MIN 10/1/2022 Artur Quinones, PT GP 1          PT G-Codes  Outcome Measure Options: AM-PAC 6 Clicks Basic Mobility (PT)  AM-PAC 6 Clicks Score (PT): 16  AM-PAC 6 Clicks Score (OT): 15    Artur Quinones PT  10/1/2022

## 2022-10-01 NOTE — PLAN OF CARE
Goal Outcome Evaluation:  Plan of Care Reviewed With: patient        Progress: improving  Pt aox4, vss, voiding spontaneously, room air, dressing CDI, minimal c/o pain controlled with po meds and repositioning, pt worked with PT this afternoon and had some c/o dizziness and some orthostatic hypotension- md was contacted and a bolus was ordered, bp is now stable and no c/o dizziness, plan is to dc to continue care Eder possibly Tuesday- cm following.

## 2022-10-01 NOTE — PLAN OF CARE
Goal Outcome Evaluation:  Plan of Care Reviewed With: patient           Outcome Evaluation: Pt performed bed mobility min A, STS min A, and ambulated 36' CGA w/ a RW. Pt with complaints of dizziness and returned to recliner- BP checked twice with low readings. Notified NSG of findings.

## 2022-10-01 NOTE — PLAN OF CARE
Goal Outcome Evaluation:  Plan of Care Reviewed With: patient        Progress: improving       VS WNL, RA, voiding spontaneously, AOx4, freq weight shifting and turning throughout shift, slept well between nursing care

## 2022-10-02 LAB
ALBUMIN SERPL-MCNC: 2.5 G/DL (ref 3.5–5.2)
ALBUMIN/GLOB SERPL: 0.6 G/DL
ALP SERPL-CCNC: 320 U/L (ref 39–117)
ALT SERPL W P-5'-P-CCNC: 34 U/L (ref 1–41)
ANION GAP SERPL CALCULATED.3IONS-SCNC: 10 MMOL/L (ref 5–15)
AST SERPL-CCNC: 49 U/L (ref 1–40)
BILIRUB SERPL-MCNC: 0.8 MG/DL (ref 0–1.2)
BUN SERPL-MCNC: 15 MG/DL (ref 6–20)
BUN/CREAT SERPL: 21.4 (ref 7–25)
CALCIUM SPEC-SCNC: 8.5 MG/DL (ref 8.6–10.5)
CHLORIDE SERPL-SCNC: 95 MMOL/L (ref 98–107)
CO2 SERPL-SCNC: 26 MMOL/L (ref 22–29)
CREAT SERPL-MCNC: 0.7 MG/DL (ref 0.76–1.27)
EGFRCR SERPLBLD CKD-EPI 2021: 107.5 ML/MIN/1.73
FERRITIN SERPL-MCNC: 308.8 NG/ML (ref 30–400)
FOLATE SERPL-MCNC: 16.4 NG/ML (ref 4.78–24.2)
GLOBULIN UR ELPH-MCNC: 3.9 GM/DL
GLUCOSE BLDC GLUCOMTR-MCNC: 143 MG/DL (ref 70–130)
GLUCOSE BLDC GLUCOMTR-MCNC: 153 MG/DL (ref 70–130)
GLUCOSE BLDC GLUCOMTR-MCNC: 227 MG/DL (ref 70–130)
GLUCOSE BLDC GLUCOMTR-MCNC: 236 MG/DL (ref 70–130)
GLUCOSE SERPL-MCNC: 258 MG/DL (ref 65–99)
IRON 24H UR-MRATE: 35 MCG/DL (ref 59–158)
IRON SATN MFR SERPL: 13 % (ref 20–50)
POTASSIUM SERPL-SCNC: 4.5 MMOL/L (ref 3.5–5.2)
PROT SERPL-MCNC: 6.4 G/DL (ref 6–8.5)
RETICS # AUTO: 0.11 10*6/MM3 (ref 0.02–0.13)
RETICS/RBC NFR AUTO: 3.13 % (ref 0.7–1.9)
SODIUM SERPL-SCNC: 131 MMOL/L (ref 136–145)
TIBC SERPL-MCNC: 267 MCG/DL (ref 298–536)
TRANSFERRIN SERPL-MCNC: 179 MG/DL (ref 200–360)
VIT B12 BLD-MCNC: 1032 PG/ML (ref 211–946)

## 2022-10-02 PROCEDURE — 80053 COMPREHEN METABOLIC PANEL: CPT | Performed by: PHYSICIAN ASSISTANT

## 2022-10-02 PROCEDURE — 97530 THERAPEUTIC ACTIVITIES: CPT

## 2022-10-02 PROCEDURE — 82728 ASSAY OF FERRITIN: CPT | Performed by: PHYSICIAN ASSISTANT

## 2022-10-02 PROCEDURE — 25010000002 NA FERRIC GLUC CPLX PER 12.5 MG: Performed by: PHYSICIAN ASSISTANT

## 2022-10-02 PROCEDURE — 85045 AUTOMATED RETICULOCYTE COUNT: CPT | Performed by: PHYSICIAN ASSISTANT

## 2022-10-02 PROCEDURE — 97116 GAIT TRAINING THERAPY: CPT

## 2022-10-02 PROCEDURE — 25010000002 DAPTOMYCIN PER 1 MG: Performed by: INTERNAL MEDICINE

## 2022-10-02 PROCEDURE — 82746 ASSAY OF FOLIC ACID SERUM: CPT | Performed by: PHYSICIAN ASSISTANT

## 2022-10-02 PROCEDURE — 82962 GLUCOSE BLOOD TEST: CPT

## 2022-10-02 PROCEDURE — 83540 ASSAY OF IRON: CPT | Performed by: PHYSICIAN ASSISTANT

## 2022-10-02 PROCEDURE — 63710000001 INSULIN DETEMIR PER 5 UNITS: Performed by: PHYSICIAN ASSISTANT

## 2022-10-02 PROCEDURE — 84466 ASSAY OF TRANSFERRIN: CPT | Performed by: PHYSICIAN ASSISTANT

## 2022-10-02 PROCEDURE — 63710000001 INSULIN LISPRO (HUMAN) PER 5 UNITS: Performed by: ORTHOPAEDIC SURGERY

## 2022-10-02 PROCEDURE — 82607 VITAMIN B-12: CPT | Performed by: PHYSICIAN ASSISTANT

## 2022-10-02 PROCEDURE — 63710000001 INSULIN LISPRO (HUMAN) PER 5 UNITS: Performed by: NURSE PRACTITIONER

## 2022-10-02 PROCEDURE — 99232 SBSQ HOSP IP/OBS MODERATE 35: CPT | Performed by: PHYSICIAN ASSISTANT

## 2022-10-02 RX ORDER — OXYCODONE HYDROCHLORIDE 15 MG/1
7.5 TABLET ORAL EVERY 4 HOURS PRN
Status: DISCONTINUED | OUTPATIENT
Start: 2022-10-02 | End: 2022-10-05 | Stop reason: HOSPADM

## 2022-10-02 RX ORDER — POTASSIUM CHLORIDE 750 MG/1
20 CAPSULE, EXTENDED RELEASE ORAL DAILY
Status: DISCONTINUED | OUTPATIENT
Start: 2022-10-03 | End: 2022-10-03

## 2022-10-02 RX ORDER — FUROSEMIDE 40 MG/1
40 TABLET ORAL DAILY
Status: DISCONTINUED | OUTPATIENT
Start: 2022-10-02 | End: 2022-10-03

## 2022-10-02 RX ADMIN — ACETAMINOPHEN 1000 MG: 500 TABLET, FILM COATED ORAL at 16:59

## 2022-10-02 RX ADMIN — Medication 10 ML: at 09:00

## 2022-10-02 RX ADMIN — INSULIN DETEMIR 24 UNITS: 100 INJECTION, SOLUTION SUBCUTANEOUS at 20:02

## 2022-10-02 RX ADMIN — SPIRONOLACTONE 50 MG: 50 TABLET ORAL at 08:29

## 2022-10-02 RX ADMIN — DAPTOMYCIN 650 MG: 500 INJECTION, POWDER, LYOPHILIZED, FOR SOLUTION INTRAVENOUS at 08:28

## 2022-10-02 RX ADMIN — SODIUM CHLORIDE 250 MG: 9 INJECTION, SOLUTION INTRAVENOUS at 15:35

## 2022-10-02 RX ADMIN — MULTIVITAMIN TABLET 1 TABLET: TABLET at 08:29

## 2022-10-02 RX ADMIN — INSULIN LISPRO 18 UNITS: 100 INJECTION, SOLUTION INTRAVENOUS; SUBCUTANEOUS at 08:30

## 2022-10-02 RX ADMIN — INSULIN LISPRO 18 UNITS: 100 INJECTION, SOLUTION INTRAVENOUS; SUBCUTANEOUS at 16:59

## 2022-10-02 RX ADMIN — SENNOSIDES AND DOCUSATE SODIUM 2 TABLET: 50; 8.6 TABLET ORAL at 20:02

## 2022-10-02 RX ADMIN — INSULIN LISPRO 4 UNITS: 100 INJECTION, SOLUTION INTRAVENOUS; SUBCUTANEOUS at 12:36

## 2022-10-02 RX ADMIN — FUROSEMIDE 40 MG: 40 TABLET ORAL at 08:29

## 2022-10-02 RX ADMIN — POTASSIUM CHLORIDE 20 MEQ: 750 CAPSULE, EXTENDED RELEASE ORAL at 08:29

## 2022-10-02 RX ADMIN — ACETAMINOPHEN 650 MG: 325 TABLET, FILM COATED ORAL at 20:02

## 2022-10-02 RX ADMIN — INSULIN DETEMIR 24 UNITS: 100 INJECTION, SOLUTION SUBCUTANEOUS at 09:23

## 2022-10-02 RX ADMIN — INSULIN LISPRO 2 UNITS: 100 INJECTION, SOLUTION INTRAVENOUS; SUBCUTANEOUS at 16:59

## 2022-10-02 RX ADMIN — ASPIRIN 81 MG: 81 TABLET, COATED ORAL at 08:31

## 2022-10-02 RX ADMIN — INSULIN LISPRO 18 UNITS: 100 INJECTION, SOLUTION INTRAVENOUS; SUBCUTANEOUS at 12:36

## 2022-10-02 RX ADMIN — ASPIRIN 81 MG: 81 TABLET, COATED ORAL at 20:02

## 2022-10-02 RX ADMIN — INSULIN LISPRO 4 UNITS: 100 INJECTION, SOLUTION INTRAVENOUS; SUBCUTANEOUS at 08:31

## 2022-10-02 RX ADMIN — ACETAMINOPHEN 1000 MG: 500 TABLET, FILM COATED ORAL at 12:36

## 2022-10-02 RX ADMIN — TRAZODONE HYDROCHLORIDE 50 MG: 50 TABLET ORAL at 20:02

## 2022-10-02 RX ADMIN — ACETAMINOPHEN 1000 MG: 500 TABLET, FILM COATED ORAL at 04:44

## 2022-10-02 NOTE — PLAN OF CARE
Goal Outcome Evaluation:  Plan of Care Reviewed With: patient        Progress: no change    VS WNL, RA, slept well throughout night, voiding spontaneously, minimal complaints of pain, no changes overnight

## 2022-10-02 NOTE — PROGRESS NOTES
[facility] Progress Note        Antibiotics:  Anti-Infectives (From admission, onward)    Ordered     Dose/Rate Route Frequency Start Stop    09/25/22 0753  DAPTOmycin (CUBICIN) 650 mg in sodium chloride 0.9 % 50 mL IVPB        Ordering Provider: Bryce Euceda MD    8 mg/kg × 83 kg (Adjusted)  100 mL/hr over 30 Minutes Intravenous Every 24 Hours 09/25/22 0900 11/06/22 0859    09/21/22 1358  ceFAZolin in dextrose (ANCEF) IVPB solution 2 g        Ordering Provider: Brain Bentley MD    2 g  over 30 Minutes Intravenous Once 09/21/22 1445 09/21/22 1512    09/21/22 0204  vancomycin 2000 mg/500 mL 0.9% NS IVPB ([facility])        Ordering Provider: Ronen Payan DO    2,000 mg  over 120 Minutes Intravenous Once 09/21/22 0300 09/21/22 0554          CC: fatigue    HPI:      Patient is a 57 y.o.  Yr old male with history cirrhosis/diabetes and other comorbidity as outlined below.  History of left foot gas gangrene with osteomyelitis and MRSA bacteremia treated in [place] by Dr. Giordano in May/June 2022.  Family reports left transmetatarsal amputation in approximately 8 weeks IV vancomycin.  Notes from [place] indicate transthoracic echocardiogram no vegetation per Dr. Giordano; he thinks he might of had several weeks of oral antibiotic after that but not entirely clear.  He is admitted to [facility] September 20 with progressive right knee pain occurring over the past week preceding admission.  He thinks he might of twisted it but ended up with progressive redness/swelling and pain.  No specific blunt force or penetrating trauma.  No animal insect arthropod bite.  No open wounds or active drainage.  No prior history of VRE C. difficile or ESBL/K PC/CRE organisms; he was evaluated by orthopedics and taken to the operating room for right knee incision/drainage and concern for septic arthritis per Dr. Bentley; blood cultures have shown gram-positive cocci and initial PCR data suggesting MRSA.  Had dysuria but urinalysis  not consistent with UTI.  No hematuria or pyuria    9/23 with TV echodensity     9/26/22  TYRESE no vegetation per cardiology    10/2/22  Seen early and sleepy;  Case management working on options; no oxygen;  Per nursing,  no new focal pain or distress; postop Right knee pain is  dull at present,  Better controlled per nursing,   nonradiating, worse with movement, better with pain meds and 2  out of 10 in severity at present; he denies any other focal musculoskeletal pain.  No back pain. No myalgia     Left foot with no redness/swelling or pain.  Surgical site healed with no open wound or active drainage.     No headache photophobia or neck stiffness.  No nausea vomiting diarrhea or abdominal pain.  No shortness of breath cough or hemoptysis.  No other new skin rash.  No other recent procedures or interventions.  No indwelling prosthetic material otherwise.  No indwelling pacemaker chronic lines       ROS:      10/2/22 per nursing; No f/c/s. No n/v/d. No rash. No new ADR to Abx.       Constitutional-- No Fever, chills or sweats.  Appetite diminished with fatigue.  Heent-- No new vision, hearing or throat complaints.  No epistaxis or oral sores.  Denies odynophagia or dysphagia.  No flashers, floaters or eye pain.   No headache, photophobia or neck stiffness.  CV-- No chest pain, palpitation or syncope  Resp-- No SOB/cough/Hemoptysis  GI- No nausea, vomiting, or diarrhea.  No hematochezia, melena, or hematemesis. Denies jaundice  -- No dysuria, hematuria, or flank pain.  Denies hesitancy, urgency or flank pain.  Lymph- no swollen lymph nodes in neck/axilla or groin.   Heme- No active bruising or bleeding; no Hx of DVT or PE.  MS-- no swelling or pain in the bones or joints of arms/legs.  No new back pain.  Neuro-- No acute focal weakness or numbness in the arms or legs.  No seizures.     Full 12 point review of systems reviewed and negative otherwise for acute complaints, except for above       PE:   /63    "Pulse 73   Temp 98.1 °F (36.7 °C) (Oral)   Resp 14   Ht 177.8 cm (70\")   Wt 98 kg (216 lb)   SpO2 97%   BMI 30.99 kg/m²       GENERAL: sleepy, in no acute distress.  Chronically ill-appearing  HEENT: Normocephalic, atraumatic.   No conjunctival injection. No icterus. Oropharynx clear without evidence of thrush or exudate. No evidence of peridontal disease.    NECK: Supple without nuchal rigidity. No mass.   HEART: RRR; soft murmur LSB, rubs, gallops.   LUNGS: Diminished at bases but otherwise clear to auscultation bilaterally without wheezing, rales, rhonchi. Normal respiratory effort. Nonlabored. No dullness.  ABDOMEN: Soft, nontender, nondistended. Positive bowel sounds. No rebound or guarding. NO mass or HSM.  EXT:  No cyanosis, clubbing or edema. No cord.   MSK: FROM without joint effusions noted arms/legs.    SKIN: Warm and dry without cutaneous eruptions on Inspection/palpation.    NEURO: sleepy     Left foot with no redness, induration or warmth.  No discrete mass bulge or fluctuance.  No crepitus or bulla.  Prior surgical site healed at [facility].  No open wound or active drainage; no tenderness     Right knee postop surgical site covered.  Exposed skin without obvious redness induration or warmth.  No discrete mass bulge or fluctuance.  No crepitus or bulla.     No peripheral stigmata/phenomena of endocarditis     IV without obvious redness or drainage    Laboratory Data    Results from last 7 days   Lab Units 09/29/22  1034 09/27/22  0412   WBC 10*3/mm3 6.09 6.67   HEMOGLOBIN g/dL 8.6* 8.2*   HEMATOCRIT % 26.9* 25.3*   PLATELETS 10*3/mm3 199 158     Results from last 7 days   Lab Units 10/02/22  1004   SODIUM mmol/L 131*   POTASSIUM mmol/L 4.5   CHLORIDE mmol/L 95*   CO2 mmol/L 26.0   BUN mg/dL 15   CREATININE mg/dL 0.70*   GLUCOSE mg/dL 258*   CALCIUM mg/dL 8.5*     Results from last 7 days   Lab Units 10/02/22  1004   ALK PHOS U/L 320*   BILIRUBIN mg/dL 0.8   ALT (SGPT) U/L 34   AST (SGOT) U/L 49*       "         Estimated Creatinine Clearance: 136.7 mL/min (A) (by C-G formula based on SCr of 0.7 mg/dL (L)).      Microbiology:      Radiology:  Imaging Results (Last 72 Hours)     ** No results found for the last 72 hours. **            Impression:         --Acute MRSA bacteremia/septicemia, recurrent with prior history positive cultures May 2022 and recurrent despite prior 8 weeks of IV vancomycin and left foot surgery in addition to other supportive measures.   TTE with ? TV echodensity;   IV daptomycin 8 mg/kg initiated with pharmacy assisting with dosing.  High risk for further serious morbidity and other serious sequela including persistent/progressive or recurrent infection, metastatic foci of involvement and other dire consequences. TYRESE NO VEGETATION per cardiology; at risk for occult endovascular focus unable to confirm so far.  Nonetheless, given circumstances he will receive ongoing treatment empirically for potential occult endovascular focus     --Acute right knee pain/septic arthritis with surgical intervention, incision/debridement by Dr. Bentley on September 21.   MRSA+ in the setting of MRSA bacteremia.     --History of cirrhosis by records.  Unclear etiology.   further GI referral/eval per  medicine team     --Diabetes.  You need to tightly control blood sugar to give best chance for healing.;  Described as uncontrolled by medicine team at admission    --TCP better     PLAN:       -- IV daptomycin likely at least 6 weeks, potentially step down to oral abx after that depending on clinical course     -- Check/review labs cultures and scans     -- Partial history per nursing staff       -- Highly complex set of issues with high risk for further serious morbidity and other serious sequela    --TYRESE noted; consider repeat TTE 2-4 weeks to evaluate for any evolving change;  Sooner if clinically worse    --anticipate rehab    -- case management looking into options    Bryce Euceda MD  10/2/2022

## 2022-10-02 NOTE — PROGRESS NOTES
[facility] Medicine Services  PROGRESS NOTE    Patient Name: Melchor Hardwick III  : 1965  MRN: [id number]    Date of Admission: 2022  Primary Care Physician: Missy Up APRN    Subjective     CC: f/u septic R knee     HPI:  In bed. Feeling okay. Says he got dizzy / lightheaded when trying to get out of bed yesterday evening. Got 500mL bolus with improvement. He thinks this has happened more than once since admission. No chest pain or dyspnea. No abdominal pain, nausea or vomiting. Bowels moving fine. Knee hurts but pain is manageable with current regimen. Hopes to go to rehab Tuesday.     ROS:  Gen- No fevers, chills  CV- No chest pain, palpitations  Resp- No cough, dyspnea  GI- No N/V/D, abd pain    Objective     Vital Signs:   Temp:  [98 °F (36.7 °C)-98.8 °F (37.1 °C)] 98 °F (36.7 °C)  Heart Rate:  [68-75] 72  Resp:  [14-18] 16  BP: (104-131)/(47-64) 112/63     Physical Exam:  Constitutional: No acute distress, awake, alert and conversant. Laying in bed   HENT: NCAT, mucous membranes moist  Respiratory: Clear to auscultation bilaterally, respiratory effort normal   Cardiovascular: RRR, no murmurs, rubs, or gallops  Gastrointestinal: Positive bowel sounds, soft, nontender, nondistended  Musculoskeletal: No bilateral ankle edema. R knee incision dressed - I did not remove for exam. L midfoot amputation stump c/d/i without erythema, edema or induration   Psychiatric: Appropriate affect, cooperative  Neurologic: Oriented x 3, moves all extremities spontaneously without focal deficts, speech clear and coherent     Results Reviewed:  LAB RESULTS:      Lab 22  1034 22  0412   WBC 6.09 6.67   HEMOGLOBIN 8.6* 8.2*   HEMATOCRIT 26.9* 25.3*   PLATELETS 199 158   MCV 77.7* 76.4*         Lab 10/02/22  1004 22  1034 22  0412   SODIUM 131* 130* 135*   POTASSIUM 4.5 4.6 4.5   CHLORIDE 95* 96* 98   CO2 26.0 29.0 29.0   ANION GAP 10.0 5.0 8.0   BUN 15 11 12    CREATININE 0.70* 0.59* 0.62*   EGFR 107.5 113.2 111.5   GLUCOSE 258* 232* 129*   CALCIUM 8.5* 8.2* 8.0*         Lab 10/02/22  1004 09/29/22  1034 09/27/22  0412   TOTAL PROTEIN 6.4 5.6* 5.1*   ALBUMIN 2.50* 2.40* 2.20*   GLOBULIN 3.9 3.2 2.9   ALT (SGPT) 34 34 19   AST (SGOT) 49* 61* 34   BILIRUBIN 0.8 0.8 0.6   ALK PHOS 320* 304* 203*         Lab 10/02/22  1004   IRON 35*   IRON SATURATION 13*   TIBC 267*   TRANSFERRIN 179*   FERRITIN 308.80     Brief Urine Lab Results  (Last result in the past 365 days)      Color   Clarity   Blood   Leuk Est   Nitrite   Protein   CREAT   Urine HCG        09/21/22 0141 Yellow   Cloudy   Negative   Negative   Negative   Trace               Microbiology Results Abnormal     Procedure Component Value - Date/Time    Blood Culture - Blood, Arm, Left [[id number]]  (Normal) Collected: 09/26/22 1126    Lab Status: Final result Specimen: Blood from Arm, Left Updated: 10/01/22 1232     Blood Culture No growth at 5 days    Blood Culture - Blood, Leg, Left [[id number]]  (Normal) Collected: 09/26/22 1126    Lab Status: Final result Specimen: Blood from Leg, Left Updated: 10/01/22 1232     Blood Culture No growth at 5 days    Anaerobic Culture 10 Day Incubation - Tissue, Knee, Right [[id number]] Collected: 09/21/22 1543    Lab Status: Final result Specimen: Tissue from Knee, Right Updated: 10/01/22 0648     Anaerobic Culture No anaerobes isolated at 10 days    Anaerobic Culture 10 Day Incubation - Tissue, Knee, Right [[id number]] Collected: 09/21/22 1543    Lab Status: Final result Specimen: Tissue from Knee, Right Updated: 10/01/22 0648     Anaerobic Culture No anaerobes isolated at 10 days    Anaerobic Culture 10 Day Incubation - Synovium, Knee, Right [[id number]] Collected: 09/21/22 1541    Lab Status: Final result Specimen: Synovium from Knee, Right Updated: 10/01/22 0648     Anaerobic Culture No anaerobes isolated at 10 days    Fungus Culture - Synovium, Knee, Right [[id number]] Collected:  09/21/22 1541    Lab Status: Preliminary result Specimen: Synovium from Knee, Right Updated: 09/28/22 1748     Fungus Culture No fungus isolated at 1 week    Fungus Culture - Tissue, Knee, Right [[id number]] Collected: 09/21/22 1543    Lab Status: Preliminary result Specimen: Tissue from Knee, Right Updated: 09/28/22 1748     Fungus Culture No fungus isolated at 1 week    Fungus Culture - Tissue, Knee, Right [[id number]] Collected: 09/21/22 1543    Lab Status: Preliminary result Specimen: Tissue from Knee, Right Updated: 09/28/22 1748     Fungus Culture No fungus isolated at 1 week    AFB Culture - Synovium, Knee, Right [[id number]] Collected: 09/21/22 1541    Lab Status: Preliminary result Specimen: Synovium from Knee, Right Updated: 09/28/22 1748     AFB Culture No AFB isolated at 1 week     AFB Stain No acid fast bacilli seen on concentrated smear        No radiology results from the last 24 hrs    Results for orders placed during the hospital encounter of 09/20/22    Adult Transesophageal Echo (TYRESE) W/ Cont if Necessary Per Protocol    Interpretation Summary  · Normal left ventricular cavity size and wall thickness noted. All left ventricular wall segments contract normally. Estimated EF 60%.  · Normal RV structure and function  · Normal mitral valve with no significant stenosis or regurgitation  · Normal aortic valve with no significant stenosis or regurgitation, valve is trileaflet.  · Tricuspid valve has mild regurgitation, no evidence of vegetation on this exam  · Pulmonic valve is normal with no significant regurgitation seen in normal leaflet opening.  · No source of intracardiac infection identified. If there remains high clinical suspicion for infective endocarditis consider repeat study in 4 to 5 days.    I have reviewed the medications:  Scheduled Meds:acetaminophen, 1,000 mg, Oral, Q8H  aspirin, 81 mg, Oral, Q12H  DAPTOmycin, 8 mg/kg (Adjusted), Intravenous, Q24H  furosemide, 40 mg, Oral, Daily  insulin  detemir, 24 Units, Subcutaneous, Q12H  insulin lispro, 0-9 Units, Subcutaneous, TID AC  Insulin Lispro, 18 Units, Subcutaneous, TID With Meals  multivitamin, 1 tablet, Oral, Daily  nadolol, 20 mg, Oral, Q24H  [START ON 10/3/2022] potassium chloride, 20 mEq, Oral, Daily  senna-docusate sodium, 2 tablet, Oral, BID  sodium chloride, 10 mL, Intravenous, Q12H  spironolactone, 50 mg, Oral, Daily  traZODone, 50 mg, Oral, Nightly      Continuous Infusions:ropivacaine,       PRN Meds:.•  acetaminophen **OR** acetaminophen **OR** acetaminophen  •  senna-docusate sodium **AND** polyethylene glycol **AND** bisacodyl **AND** bisacodyl  •  dextrose  •  dextrose  •  diphenhydrAMINE **OR** diphenhydrAMINE  •  glucagon (human recombinant)  •  hydrOXYzine  •  magnesium sulfate **OR** magnesium sulfate **OR** magnesium sulfate  •  [] HYDROmorphone **AND** naloxone  •  naloxone  •  ondansetron **OR** ondansetron  •  oxyCODONE  •  potassium chloride **OR** potassium chloride **OR** potassium chloride  •  sodium chloride  •  sodium chloride    Assessment & Plan     Active Hospital Problems    Diagnosis  POA   • **Pyogenic arthritis of right knee joint, due to unspecified organism (HCC) [M00.9]  Yes   • MRSA bacteremia [R78.81, B95.62]  Yes   • 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     Brief Hospital Course to date:  Melchor Hardwick III is a 57 y.o. male with PMH significant for HTN, HLD, insulin-dependent DMII, PAZ cirrhosis, chronic anemia and obesity. Recently admitted to [facility] 5/10-22 for sepsis secondary to L foot gas gangrene/osteomyelitis with associated MRSA bacteremia. He required L midfoot amputation. He was diagnosed with PAZ cirrhosis with  ascites / portal hypertension during this admission. He transferred to [facility] acute rehab 6/2-6/16/22. Evaluated by PCP 9/15/22 due to R knee pain. Presented to [facility] ED 9/21/22 due to worsening R knee pain. Found to have R knee septic arthritis with associated MRSA bacteremia and tricuspid valve endocarditis.     He underwent I&D with Dr. Bentley on 9/22/22, gross purulence noted. Both surgical and blood cultures have grown MRSA. TTE revealed a possible tricuspid valve vegetation. CT Surgery consulted for TYRESE, which did not show vegetation. CTS has signed off. Infectious Disease is following.  Planning for 6 wks of IV daptomycin with TTE in 2-4 weeks.     R knee septic arthritis  MRSA bacteremia  Tricuspid valve endocarditis  - s/p knee aspiration in ED - > 50K WBCs on micro  - s/p R knee I&D by Dr. Bentley 9/22/22 - gross purulence encountered intraoperatively. Cultures (+) MRSA  - 9/20/22 blood cultures growing MRSA.   - Repeat blood cultures from 9/26/22 - NGTD   - 9/23/22 TTE concerning for 1 x 0.4cm mobile echodensity on tricuspid valve, concerning for vegetation  - 9/26/22 TTE did not show vegetation. CT surgery has evaluated and signed off  - Appreciate ID assistance. Will need 6-8 weeks of IV Daptomycin. Repeat TTE in 2-4 weeks     Hypokalemia  Hypomagenesemia  - Replace per protocol  - Lasix dose decreased to daily, will decrease potassium as well     Chronic anemia   Thrombocytopenia, resolved  - Iron studies consistent with iron deficiency with anemia of chronic disease  - Give IV iron daily x 5 days or until DC. Then transition to PO supplement  - Folate / B12 levels pending     Poorly-controlled DMII  - Hgb A1c 8.8%  - DC Metformin - contraindicated with cirrhosis  - Increase Levemir to 24 units BID, continue Lispro 18 units TID AC + SSI   - DM educator has seen    R buttock wound  - Present on arrival, now with skin tear  - WOC following       PAZ cirrhosis  - Diagnosed in June  2022 during recent admission at [facility]  - Orthostatic yesterday evening  - Decrease Lasix to 40mg daily (from BID)  - Continue Spironolactine 50mg daily, Nadolol 20mg daily  - Outpatient GI follow up as previously planned, will need GI referral at discharge     Recent osteomyelitis of left foot with MRSA bacteremia, s/p amputation left foot May 2022  - Was hospitalized at [facility] due to left foot osteomyelitis, initially had a washout and IV antibiotics but due to no improvement, ultimately had midfoot amputation performed by Podiatry on 5/18/22. He had MRSA bacteremia associated with this and completed 8 week course of IV Vancomycin.     Constipation, resolved  - Continue bowel regimen     Insomnia  Anxiety  - Continue Trazodone 50 mg QHS and PRN Hydroxyzine TID     Expected Discharge Location and Transportation: [facility] pending insurance approval   Expected Discharge Date: 10/4/22 @ 0900    DVT prophylaxis:Mechanical DVT prophylaxis orders are present.     AM-PAC 6 Clicks Score (PT): 16 (10/02/22 0800)    CODE STATUS:   Code Status and Medical Interventions:   Ordered at: 09/21/22 1645     Level Of Support Discussed With:    Patient     Code Status (Patient has no pulse and is not breathing):    CPR (Attempt to Resuscitate)     Medical Interventions (Patient has pulse or is breathing):    Full     Nova Galeas PA-C  10/02/22

## 2022-10-02 NOTE — THERAPY PROGRESS REPORT/RE-CERT
Patient Name: Melchor Hardwick III  : 1965    MRN: [id number]                              Today's Date: 10/2/2022       Admit Date: 2022    Visit Dx:     ICD-10-CM ICD-9-CM   1. Pyogenic arthritis of right knee joint, due to unspecified organism (HCC)  M00.9 711.06   2. Right knee pain, unspecified chronicity  M25.561 719.46   3. Generalized weakness  R53.1 780.79   4. Hyperglycemia  R73.9 790.29   5. Pyogenic arthritis of right knee joint (HCC)  M00.9 711.06     Patient Active Problem List   Diagnosis   • Hyperlipidemia   • Hypertensive disorder   • Obesity   • Type 2 diabetes mellitus (HCC)   • Gas gangrene of foot (HCC)   • Cellulitis in diabetic foot (HCC)   • Other acute osteomyelitis of left foot (HCC)   • Osteomyelitis (HCC)   • Critical illness myopathy   • Pyogenic arthritis of right knee joint, due to unspecified organism (HCC)   • Cirrhosis of liver (HCC)   • MRSA bacteremia   • Endocarditis of tricuspid valve     Past Medical History:   Diagnosis Date   • Diabetes mellitus (HCC)    • Hyperlipidemia    • Hypertension    • Pyogenic arthritis of right knee joint, due to unspecified organism (HCC) 2022     Past Surgical History:   Procedure Laterality Date   • ABSCESS DRAINAGE      PERINEUM   • AMPUTATION FOOT Left 2022    Procedure: AMPUTATION FOOT;  Surgeon: Addison Colby MD;  Location: [facility];  Service: Podiatry;  Laterality: Left;   • INCISION AND DRAINAGE LEG Left 05/10/2022    Procedure: INCISION AND DRAINAGE LOWER EXTREMITY;  Surgeon: Addison Colby MD;  Location: [facility] OR;  Service: Podiatry;  Laterality: Left;   • KNEE INCISION AND DRAINAGE Right 2022    Procedure: KNEE INCISION AND DRAINAGE RIGHT;  Surgeon: Brain Bentley MD;  [facility];  Service: Orthopedics;  Laterality: Right;   • WOUND DEBRIDEMENT Left 2022    Procedure: DEBRIDEMENT FOOT;  Surgeon: Addison Colby MD;  Location: [facility] OR;  Service: Podiatry;  Laterality: Left;       General Information     Row Name 10/02/22 1523          Physical Therapy Time and Intention    Document Type progress note/recertification  -     Mode of Treatment physical therapy  -     Row Name 10/02/22 1523          General Information    Patient Profile Reviewed yes  -FW     Existing Precautions/Restrictions fall;brace on at all times;other (see comments)  R Knee with KI on, NO ROM, L foot s/p amputation -use boot  -     Row Name 10/02/22 1523          Cognition    Orientation Status (Cognition) oriented x 4  -     Row Name 10/02/22 1523          Safety Issues, Functional Mobility    Impairments Affecting Function (Mobility) strength;balance;pain;endurance/activity tolerance;postural/trunk control;range of motion (ROM);sensation/sensory awareness  -FW           User Key  (r) = Recorded By, (t) = Taken By, (c) = Cosigned By    Initials Name Provider Type     Artur Quinones PT Physical Therapist               Mobility     Row Name 10/02/22 1523          Bed Mobility    Bed Mobility rolling right;sidelying-sit  -FW     Rolling Right [place] (Bed Mobility) standby assist  -FW     Sidelying-Sit [place] (Bed Mobility) minimum assist (75% patient effort)  -FW     Assistive Device (Bed Mobility) bed rails;head of bed elevated  -     Row Name 10/02/22 1523          Sit-Stand Transfer    Sit-Stand [place] (Transfers) minimum assist (75% patient effort)  -FW     Assistive Device (Sit-Stand Transfers) walker, front-wheeled  -     Row Name 10/02/22 1523          Gait/Stairs (Locomotion)    [place] Level (Gait) verbal cues;contact guard  -     Assistive Device (Gait) walker, front-wheeled  -     Distance in Feet (Gait) 47  -FW     Deviations/Abnormal Patterns (Gait) right sided deviations;antalgic;stride length decreased;weight shifting decreased  -FW     Bilateral Gait Deviations forward flexed posture;heel strike decreased  -FW     Right Sided Gait Deviations weight shift ability  decreased  -     Comment, (Gait/Stairs) 47' w/ a chair follow; pt gait limited by fatigue  -     Row Name 10/02/22 1523          Mobility    Extremity Weight-bearing Status left lower extremity;right lower extremity  -FW     Left Lower Extremity (Weight-bearing Status) weight-bearing as tolerated (WBAT)  -FW     Right Lower Extremity (Weight-bearing Status) weight-bearing as tolerated (WBAT);other (see comments)  -           User Key  (r) = Recorded By, (t) = Taken By, (c) = Cosigned By    Initials Name Provider Type     Artur Quinones, JANNETH Physical Therapist               Obj/Interventions     Row Name 10/02/22 1525          Range of Motion Comprehensive    General Range of Motion lower extremity range of motion deficits identified  -     Comment, General Range of Motion unable to assess R knee ROM due to ROM restrictions; L knee ROM WFL  -     Row Name 10/02/22 1525          Strength Comprehensive (MMT)    General Manual Muscle Testing (MMT) Assessment lower extremity strength deficits identified  -     Comment, General Manual Muscle Testing (MMT) Assessment unable to assess RLE, L knee 4+/5 grossly  -     Row Name 10/02/22 1525          Motor Skills    Therapeutic Exercise hip;knee;ankle  Therex included 10 reps of: AA SLR, hip abd/add, quad sets, ankle DF/PF, gluteal sets  -     Row Name 10/02/22 1525          Balance    Balance Assessment sitting static balance;sitting dynamic balance;standing static balance;standing dynamic balance  -     Static Sitting Balance supervision  -     Dynamic Sitting Balance standby assist  -FW     Position, Sitting Balance unsupported;sitting in chair;sitting edge of bed  -     Static Standing Balance contact guard  -     Dynamic Standing Balance contact guard  -FW     Position/Device Used, Standing Balance supported;walker, rolling  -           User Key  (r) = Recorded By, (t) = Taken By, (c) = Cosigned By    Initials Name Provider Type      Artur Quinones, PT Physical Therapist               Goals/Plan     Row Name 10/02/22 1533          Bed Mobility Goal 1 (PT)    Activity/Assistive Device (Bed Mobility Goal 1, PT) rolling to right;rolling to left;sidelying to sit/sit to sidelying  -FW     [place] Level/Cues Needed (Bed Mobility Goal 1, PT) standby assist  -FW     Time Frame (Bed Mobility Goal 1, PT) 10 days;long term goal (LTG)  -FW     Progress/Outcomes (Bed Mobility Goal 1, PT) continuing progress toward goal;goal revised this date  -FW     Row Name 10/02/22 1533          Transfer Goal 1 (PT)    Activity/Assistive Device (Transfer Goal 1, PT) sit-to-stand/stand-to-sit;bed-to-chair/chair-to-bed;walker, rolling  -FW     [place] Level/Cues Needed (Transfer Goal 1, PT) contact guard required  -FW     Time Frame (Transfer Goal 1, PT) long term goal (LTG);10 days  -FW     Progress/Outcome (Transfer Goal 1, PT) goal revised this date  -FW     Row Name 10/02/22 1533          Gait Training Goal 1 (PT)    Activity/Assistive Device (Gait Training Goal 1, PT) gait (walking locomotion);walker, rolling  -FW     [place] Level (Gait Training Goal 1, PT) contact guard required  -FW     Distance (Gait Training Goal 1, PT) 60  -FW     Time Frame (Gait Training Goal 1, PT) 2 weeks;long term goal (LTG)  -FW     Progress/Outcome (Gait Training Goal 1, PT) goal revised this date  -FW     Row Name 10/02/22 1533          Therapy Assessment/Plan (PT)    Planned Therapy Interventions (PT) balance training;bed mobility training;gait training;home exercise program;joint mobilization;patient/family education;neuromuscular re-education;transfer training;stretching;strengthening;stair training;ROM (range of motion)  -FW           User Key  (r) = Recorded By, (t) = Taken By, (c) = Cosigned By    Initials Name Provider Type    FW Artur Quinones, PT Physical Therapist               Clinical Impression     Row Name 10/02/22 1529          Pain    Pretreatment  Pain Rating 0/10 - no pain  -FW     Posttreatment Pain Rating 3/10  -FW     Pain Location - Side/Orientation Right  -FW     Pain Location - knee  -FW     Pain Intervention(s) Repositioned;Ambulation/increased activity  -FW     Row Name 10/02/22 1529          Plan of Care Review    Plan of Care Reviewed With patient  -FW     Outcome Evaluation Pt continues with decreased activity tolerance, balance deficits, and generalized weakness limiting his functional mobililty. Pt performed bed mobility min A, STS min A, and ambulated 47' CGA w/ a RW.  -FW     Row Name 10/02/22 1529          Therapy Assessment/Plan (PT)    Rehab Potential (PT) good, to achieve stated therapy goals  -FW     Criteria for Skilled Interventions Met (PT) yes;meets criteria;skilled treatment is necessary  -FW     Therapy Frequency (PT) daily  -FW     Row Name 10/02/22 1529          Vital Signs    Pre Systolic BP Rehab --  vss  -FW     O2 Delivery Pre Treatment room air  -FW     O2 Delivery Intra Treatment room air  -FW     O2 Delivery Post Treatment room air  -FW     Pre Patient Position Supine  -FW     Intra Patient Position Standing  -FW     Post Patient Position Sitting  -FW     Row Name 10/02/22 1529          Positioning and Restraints    Pre-Treatment Position in bed  -FW     Post Treatment Position chair  -FW     In Chair reclined;sitting;call light within reach;encouraged to call for assist;exit alarm on;legs elevated;waffle cushion;on mechanical lift sling  -FW           User Key  (r) = Recorded By, (t) = Taken By, (c) = Cosigned By    Initials Name Provider Type    FW Artur Quinones, PT Physical Therapist               Outcome Measures     Row Name 10/02/22 1534 10/02/22 0800       How much help from another person do you currently need...    Turning from your back to your side while in flat bed without using bedrails? 3  -FW 3  -AP    Moving from lying on back to sitting on the side of a flat bed without bedrails? 3  -FW 3  -AP     Moving to and from a bed to a chair (including a wheelchair)? 3  -FW 3  -AP    Standing up from a chair using your arms (e.g., wheelchair, bedside chair)? 3  -FW 3  -AP    Climbing 3-5 steps with a railing? 1  -FW 1  -AP    To walk in hospital room? 3  -FW 3  -AP    AM-PAC 6 Clicks Score (PT) 16  -FW 16  -AP    Highest level of mobility 5 --> Static standing  -FW 5 --> Static standing  -AP    Row Name 10/02/22 1534          Functional Assessment    Outcome Measure Options AM-PAC 6 Clicks Basic Mobility (PT)  -FW           User Key  (r) = Recorded By, (t) = Taken By, (c) = Cosigned By    Initials Name Provider Type    Ana Cristina Terrell RN Registered Nurse    Artur Sheikh, PT Physical Therapist                             Physical Therapy Education                 Title: PT OT SLP Therapies (Done)     Topic: Physical Therapy (Done)     Point: Mobility training (Done)     Learning Progress Summary           Patient Acceptance, E, VU by  at 10/2/2022 1535    Acceptance, E, VU by  at 10/1/2022 1429    Eager, E, VU by SC at 9/29/2022 1156    Comment: reviewed benefits of activity    Eager, E, VU by SC at 9/27/2022 1316    Comment: reviewed benefits of activity    Acceptance, E,D, VU,NR by LR at 9/26/2022 1128    Comment: Educated on precautions, weight bearing status, safety with mobility, correct sit<->stand t/f technique, correct gait mechanics, HEP, and progression of POC.    Acceptance, E,D, VU,NR by LR at 9/25/2022 1110    Comment: Educated on precautions, weight bearing status, safety with mobility, correct supine to sit t/f technique, correct sit<->stand t/f technique, correct gait mechanics, HEP, and progression of POC.    Acceptance, E,D, VU,NR by LR at 9/24/2022 1523    Comment: Educated on precautions, weight bearing status, HEP, safety with mobility, correct supine to sit t/f technique, correct sit<->stand t/f technique, correct gait mechanics, and progression of POC.    Acceptance, E, VU by   at 9/24/2022 0545    Acceptance, E, VU by LG at 9/23/2022 0531    Acceptance, E,D, VU,NR by MB at 9/22/2022 1350                   Point: Home exercise program (Done)     Learning Progress Summary           Patient Acceptance, E, VU by FW at 10/2/2022 1535    Eager, E, VU by SC at 9/29/2022 1156    Comment: reviewed benefits of activity    Eager, E, VU by SC at 9/27/2022 1316    Comment: reviewed benefits of activity    Acceptance, E,D, VU,NR by LR at 9/26/2022 1128    Comment: Educated on precautions, weight bearing status, safety with mobility, correct sit<->stand t/f technique, correct gait mechanics, HEP, and progression of POC.    Acceptance, E,D, VU,NR by LR at 9/25/2022 1110    Comment: Educated on precautions, weight bearing status, safety with mobility, correct supine to sit t/f technique, correct sit<->stand t/f technique, correct gait mechanics, HEP, and progression of POC.    Acceptance, E,D, VU,NR by LR at 9/24/2022 1523    Comment: Educated on precautions, weight bearing status, HEP, safety with mobility, correct supine to sit t/f technique, correct sit<->stand t/f technique, correct gait mechanics, and progression of POC.    Acceptance, E, VU by LG at 9/24/2022 0545    Acceptance, E, VU by LG at 9/23/2022 0531    Acceptance, E,D, VU,NR by MB at 9/22/2022 1350                   Point: Body mechanics (Done)     Learning Progress Summary           Patient Acceptance, E, VU by FW at 10/2/2022 1535    Acceptance, E, VU by FW at 10/1/2022 1429    Eager, E, VU by SC at 9/29/2022 1156    Comment: reviewed benefits of activity    Eager, E, VU by SC at 9/27/2022 1316    Comment: reviewed benefits of activity    Acceptance, E,D, VU,NR by LR at 9/26/2022 1128    Comment: Educated on precautions, weight bearing status, safety with mobility, correct sit<->stand t/f technique, correct gait mechanics, HEP, and progression of POC.    Acceptance, E,D, VU,NR by LR at 9/25/2022 1110    Comment: Educated on precautions,  weight bearing status, safety with mobility, correct supine to sit t/f technique, correct sit<->stand t/f technique, correct gait mechanics, HEP, and progression of POC.    Acceptance, E,D, VU,NR by LR at 9/24/2022 1523    Comment: Educated on precautions, weight bearing status, HEP, safety with mobility, correct supine to sit t/f technique, correct sit<->stand t/f technique, correct gait mechanics, and progression of POC.    Acceptance, E, VU by LG at 9/24/2022 0545    Acceptance, E, VU by LG at 9/23/2022 0531    Acceptance, E,D, VU,NR by MB at 9/22/2022 1350                   Point: Precautions (Done)     Learning Progress Summary           Patient Acceptance, E, VU by  at 10/2/2022 1535    Acceptance, E, VU by FW at 10/1/2022 1429    Eager, E, VU by SC at 9/29/2022 1156    Comment: reviewed benefits of activity    Eager, E, VU by SC at 9/27/2022 1316    Comment: reviewed benefits of activity    Acceptance, E,D, VU,NR by LR at 9/26/2022 1128    Comment: Educated on precautions, weight bearing status, safety with mobility, correct sit<->stand t/f technique, correct gait mechanics, HEP, and progression of POC.    Acceptance, E,D, VU,NR by LR at 9/25/2022 1110    Comment: Educated on precautions, weight bearing status, safety with mobility, correct supine to sit t/f technique, correct sit<->stand t/f technique, correct gait mechanics, HEP, and progression of POC.    Acceptance, E,D, VU,NR by LR at 9/24/2022 1523    Comment: Educated on precautions, weight bearing status, HEP, safety with mobility, correct supine to sit t/f technique, correct sit<->stand t/f technique, correct gait mechanics, and progression of POC.    Acceptance, E, VU by  at 9/24/2022 0545    Acceptance, E, VU by  at 9/23/2022 0531    Acceptance, E,D, VU,NR by MB at 9/22/2022 1350                               User Key     Initials Effective Dates Name Provider Type Discipline    SC 06/16/21 -  Orion Reyes PT Physical Therapist PT    LR  06/16/21 -  Modesta Engel, PT Physical Therapist PT    MB 06/16/21 -  Leatha Tobin PT Physical Therapist PT    LG 11/16/18 -  Jak De La Rosa RN Registered Nurse Nurse     05/05/22 -  Artur Quinones PT Physical Therapist PT              PT Recommendation and Plan  Planned Therapy Interventions (PT): balance training, bed mobility training, gait training, home exercise program, joint mobilization, patient/family education, neuromuscular re-education, transfer training, stretching, strengthening, stair training, ROM (range of motion)  Plan of Care Reviewed With: patient  Outcome Evaluation: Pt continues with decreased activity tolerance, balance deficits, and generalized weakness limiting his functional mobililty. Pt performed bed mobility min A, STS min A, and ambulated 47' CGA w/ a RW.     Time Calculation:    PT Charges     Row Name 10/02/22 1535             Time Calculation    Start Time 1452  -FW      PT Received On 10/02/22  -FW      PT Goal Re-Cert Due Date 10/12/22  -FW              Timed Charges    15402 - PT Therapeutic Exercise Minutes 6  -FW      37710 - Gait Training Minutes  12  -FW      35546 - PT Therapeutic Activity Minutes 10  -FW              Total Minutes    Timed Charges Total Minutes 28  -FW       Total Minutes 28  -FW            User Key  (r) = Recorded By, (t) = Taken By, (c) = Cosigned By    Initials Name Provider Type    FW Artur Quinones, PT Physical Therapist              Therapy Charges for Today     Code Description Service Date Service Provider Modifiers Qty    29852655886 HC GAIT TRAINING EA 15 MIN 10/1/2022 Artur Quinones, PT GP 1    75966907772 HC GAIT TRAINING EA 15 MIN 10/2/2022 Artur Quinones, PT GP 1    75159021157 HC PT THERAPEUTIC ACT EA 15 MIN 10/2/2022 Artur Quinones, PT GP 1          PT G-Codes  Outcome Measure Options: AM-PAC 6 Clicks Basic Mobility (PT)  AM-PAC 6 Clicks Score (PT): 16  AM-PAC 6 Clicks Score (OT): 15    Artur  Joni, PT  10/2/2022

## 2022-10-02 NOTE — PLAN OF CARE
Goal Outcome Evaluation:  Plan of Care Reviewed With: patient           Outcome Evaluation: Pt continues with decreased activity tolerance, balance deficits, and generalized weakness limiting his functional mobility. Pt performed bed mobility min A, STS min A, and ambulated 47' CGA w/ a RW.

## 2022-10-02 NOTE — PLAN OF CARE
Goal Outcome Evaluation:  Plan of Care Reviewed With: patient        Progress: improving   Aox4, vss, room air, dressing CDI, ambulated a short distance this shift- tolerated well, no c/o dizziness and bp has remained stable, adequate intake, voiding well, repositioned q2h, IS, minimal c/o pain,  plan is to dc Tuesday to continue care carmen cantor following

## 2022-10-03 LAB
CK SERPL-CCNC: 34 U/L (ref 20–200)
GLUCOSE BLDC GLUCOMTR-MCNC: 110 MG/DL (ref 70–130)
GLUCOSE BLDC GLUCOMTR-MCNC: 176 MG/DL (ref 70–130)
GLUCOSE BLDC GLUCOMTR-MCNC: 277 MG/DL (ref 70–130)

## 2022-10-03 PROCEDURE — 63710000001 INSULIN LISPRO (HUMAN) PER 5 UNITS: Performed by: ORTHOPAEDIC SURGERY

## 2022-10-03 PROCEDURE — 63710000001 INSULIN LISPRO (HUMAN) PER 5 UNITS: Performed by: NURSE PRACTITIONER

## 2022-10-03 PROCEDURE — 82962 GLUCOSE BLOOD TEST: CPT

## 2022-10-03 PROCEDURE — 82550 ASSAY OF CK (CPK): CPT | Performed by: INTERNAL MEDICINE

## 2022-10-03 PROCEDURE — 99232 SBSQ HOSP IP/OBS MODERATE 35: CPT | Performed by: PHYSICIAN ASSISTANT

## 2022-10-03 PROCEDURE — 97530 THERAPEUTIC ACTIVITIES: CPT

## 2022-10-03 PROCEDURE — 97116 GAIT TRAINING THERAPY: CPT

## 2022-10-03 PROCEDURE — 25010000002 DAPTOMYCIN PER 1 MG: Performed by: INTERNAL MEDICINE

## 2022-10-03 PROCEDURE — 63710000001 INSULIN DETEMIR PER 5 UNITS: Performed by: PHYSICIAN ASSISTANT

## 2022-10-03 PROCEDURE — 97110 THERAPEUTIC EXERCISES: CPT

## 2022-10-03 PROCEDURE — 25010000002 NA FERRIC GLUC CPLX PER 12.5 MG: Performed by: PHYSICIAN ASSISTANT

## 2022-10-03 PROCEDURE — 97535 SELF CARE MNGMENT TRAINING: CPT

## 2022-10-03 RX ORDER — SODIUM CHLORIDE 9 MG/ML
125 INJECTION, SOLUTION INTRAVENOUS CONTINUOUS
Status: ACTIVE | OUTPATIENT
Start: 2022-10-03 | End: 2022-10-04

## 2022-10-03 RX ORDER — FUROSEMIDE 40 MG/1
40 TABLET ORAL DAILY
Status: DISCONTINUED | OUTPATIENT
Start: 2022-10-05 | End: 2022-10-04

## 2022-10-03 RX ORDER — SPIRONOLACTONE 25 MG/1
25 TABLET ORAL DAILY
Status: DISCONTINUED | OUTPATIENT
Start: 2022-10-05 | End: 2022-10-04

## 2022-10-03 RX ORDER — POTASSIUM CHLORIDE 750 MG/1
20 CAPSULE, EXTENDED RELEASE ORAL DAILY
Status: DISCONTINUED | OUTPATIENT
Start: 2022-10-05 | End: 2022-10-05 | Stop reason: HOSPADM

## 2022-10-03 RX ADMIN — INSULIN DETEMIR 24 UNITS: 100 INJECTION, SOLUTION SUBCUTANEOUS at 21:05

## 2022-10-03 RX ADMIN — INSULIN LISPRO 18 UNITS: 100 INJECTION, SOLUTION INTRAVENOUS; SUBCUTANEOUS at 18:33

## 2022-10-03 RX ADMIN — ACETAMINOPHEN 1000 MG: 500 TABLET, FILM COATED ORAL at 18:04

## 2022-10-03 RX ADMIN — ASPIRIN 81 MG: 81 TABLET, COATED ORAL at 08:31

## 2022-10-03 RX ADMIN — INSULIN LISPRO 18 UNITS: 100 INJECTION, SOLUTION INTRAVENOUS; SUBCUTANEOUS at 13:03

## 2022-10-03 RX ADMIN — SODIUM CHLORIDE 250 MG: 9 INJECTION, SOLUTION INTRAVENOUS at 08:32

## 2022-10-03 RX ADMIN — MULTIVITAMIN TABLET 1 TABLET: TABLET at 08:31

## 2022-10-03 RX ADMIN — ACETAMINOPHEN 1000 MG: 500 TABLET, FILM COATED ORAL at 11:24

## 2022-10-03 RX ADMIN — ASPIRIN 81 MG: 81 TABLET, COATED ORAL at 21:06

## 2022-10-03 RX ADMIN — Medication 10 ML: at 09:14

## 2022-10-03 RX ADMIN — ACETAMINOPHEN 650 MG: 325 TABLET, FILM COATED ORAL at 21:06

## 2022-10-03 RX ADMIN — TRAZODONE HYDROCHLORIDE 50 MG: 50 TABLET ORAL at 21:06

## 2022-10-03 RX ADMIN — INSULIN LISPRO 18 UNITS: 100 INJECTION, SOLUTION INTRAVENOUS; SUBCUTANEOUS at 08:32

## 2022-10-03 RX ADMIN — INSULIN LISPRO 2 UNITS: 100 INJECTION, SOLUTION INTRAVENOUS; SUBCUTANEOUS at 13:03

## 2022-10-03 RX ADMIN — SODIUM CHLORIDE 125 ML/HR: 9 INJECTION, SOLUTION INTRAVENOUS at 18:32

## 2022-10-03 RX ADMIN — SENNOSIDES AND DOCUSATE SODIUM 2 TABLET: 50; 8.6 TABLET ORAL at 08:31

## 2022-10-03 RX ADMIN — INSULIN DETEMIR 24 UNITS: 100 INJECTION, SOLUTION SUBCUTANEOUS at 08:32

## 2022-10-03 RX ADMIN — POTASSIUM CHLORIDE 20 MEQ: 750 CAPSULE, EXTENDED RELEASE ORAL at 08:30

## 2022-10-03 RX ADMIN — SODIUM CHLORIDE 125 ML/HR: 9 INJECTION, SOLUTION INTRAVENOUS at 11:15

## 2022-10-03 RX ADMIN — DAPTOMYCIN 650 MG: 500 INJECTION, POWDER, LYOPHILIZED, FOR SOLUTION INTRAVENOUS at 08:32

## 2022-10-03 RX ADMIN — NADOLOL 20 MG: 20 TABLET ORAL at 09:14

## 2022-10-03 RX ADMIN — FUROSEMIDE 40 MG: 40 TABLET ORAL at 08:31

## 2022-10-03 RX ADMIN — INSULIN LISPRO 6 UNITS: 100 INJECTION, SOLUTION INTRAVENOUS; SUBCUTANEOUS at 18:35

## 2022-10-03 RX ADMIN — SPIRONOLACTONE 50 MG: 50 TABLET ORAL at 08:31

## 2022-10-03 NOTE — PAYOR COMM NOTE
"Sarah Lechuga, RN  Utilization Management  P:[phone]  F:[phone]  Auth# [id number]  Melchor Perez III (57 y.o. Male)             Date of Birth   1965    Social Security Number       Address   [address] [address]    Home Phone   [phone]    MRN   [id number]       Holiness   Anabaptist    Marital Status   Single                            Admission Date   9/20/22    Admission Type   Emergency    Admitting Provider   Juliana Zamora DO    Attending Provider   Juliana Zamora DO    Department, Room/Bed   [facility] 3H, S376/1       Discharge Date       Discharge Disposition       Discharge Destination                               Attending Provider: Juliana Zamora DO    Allergies: No Known Allergies    Isolation: None   Infection: MRSA (09/21/22)   Code Status: CPR   Advance Care Planning Activity    Ht: 177.8 cm (70\")   Wt: 98 kg (216 lb)    Admission Cmt: None   Principal Problem: Pyogenic arthritis of right knee joint, due to unspecified organism (HCC) [M00.9]                 Active Insurance as of 9/20/2022     Primary Coverage     Payor Plan Insurance Group Employer/Plan Group    [facility] COMMUNITY PLAN [facility] COMMUNITY PLAN [facility]     Payor Plan Address Payor Plan Phone Number Payor Plan Fax Number Effective Dates    [address]   5/1/2022 - None Entered    [facility] [id number]       Subscriber Name Subscriber Birth Date Member ID       MELCHOR PEREZ III 1965 [id number]                 Emergency Contacts      (Rel.) Home Phone Work Phone Mobile Phone    AMENA ORDAZ (Sister) [phone] -- [phone]              Current Facility-Administered Medications   Medication Dose Route Frequency Provider Last Rate Last Admin   • acetaminophen (TYLENOL) tablet 650 mg  650 mg Oral Q4H PRN Brain Bentley MD   650 mg at 10/02/22 2002    Or   • acetaminophen (TYLENOL) 160 MG/5ML solution 650 mg  650 mg Oral Q4H PRN Brain Bentley MD   "      Or   • acetaminophen (TYLENOL) suppository 650 mg  650 mg Rectal Q4H PRN Brain Bentley MD       • acetaminophen (TYLENOL) tablet 1,000 mg  1,000 mg Oral Q8H Brain Bentley MD   1,000 mg at 10/03/22 1124   • aspirin EC tablet 81 mg  81 mg Oral Q12H Brain Bentley MD   81 mg at 10/03/22 0831   • sennosides-docusate (PERICOLACE) 8.6-50 MG per tablet 2 tablet  2 tablet Oral BID Cristy Farfan MD   2 tablet at 10/03/22 0831    And   • polyethylene glycol (MIRALAX) packet 17 g  17 g Oral Daily PRN Cristy Farfan MD   17 g at 09/25/22 0806    And   • bisacodyl (DULCOLAX) EC tablet 5 mg  5 mg Oral Daily PRN Cristy Farfan MD   5 mg at 09/24/22 1612    And   • bisacodyl (DULCOLAX) suppository 10 mg  10 mg Rectal Daily PRN Cristy Farfan MD   10 mg at 09/26/22 0926   • DAPTOmycin (CUBICIN) 650 mg in sodium chloride 0.9 % 50 mL IVPB  8 mg/kg (Adjusted) Intravenous Q24H Bryce Euceda  mL/hr at 10/03/22 0832 650 mg at 10/03/22 0832   • dextrose (D50W) (25 g/50 mL) IV injection 25 g  25 g Intravenous Q15 Min PRN rBain Bentley MD       • dextrose (GLUTOSE) oral gel 15 g  15 g Oral Q15 Min PRN Brain Bentley MD       • diphenhydrAMINE (BENADRYL) capsule 25 mg  25 mg Oral Q6H PRN Brain Bentley MD   25 mg at 09/23/22 2034    Or   • diphenhydrAMINE (BENADRYL) injection 25 mg  25 mg Intravenous Q6H PRN Brain Bentley MD       • ferric gluconate (FERRLECIT) 250 mg in sodium chloride 0.9 % 120 mL IVPB  250 mg Intravenous Daily Nova Galeas PA-C 60 mL/hr at 10/03/22 0832 250 mg at 10/03/22 0832   • [START ON 10/5/2022] furosemide (LASIX) tablet 40 mg  40 mg Oral Daily Nova Galeas PA-C       • glucagon (human recombinant) (GLUCAGEN DIAGNOSTIC) injection 1 mg  1 mg Intramuscular Q15 Min PRN Brain Bentley MD       • hydrOXYzine (ATARAX) tablet 25 mg  25 mg Oral TID PRN Margot Raphael APRN       • insulin detemir (LEVEMIR) injection 24 Units  24 Units Subcutaneous Q12H  Nova Galeas PA-C   24 Units at 10/03/22 0832   • Insulin Lispro (humaLOG) injection 0-9 Units  0-9 Units Subcutaneous TID AC Brain Bentley MD   2 Units at 10/03/22 1303   • Insulin Lispro (humaLOG) injection 18 Units  18 Units Subcutaneous TID With Meals Donaldo Meron CATIE, APRAMADA   18 Units at 10/03/22 1303   • Magnesium Sulfate 2 gram Bolus, followed by 8 gram infusion (total Mg dose 10 grams)- Mg less than or equal to 1mg/dL  2 g Intravenous PRN Cristy Farfan MD        Or   • Magnesium Sulfate 2 gram / 50mL Infusion (GIVE X 3 BAGS TO EQUAL 6GM TOTAL DOSE) - Mg 1.1 - 1.5 mg/dl  2 g Intravenous PRN Cristy Farfan MD        Or   • Magnesium Sulfate 4 gram infusion- Mg 1.6-1.9 mg/dL  4 g Intravenous PRN Cristy Farfan MD 25 mL/hr at 09/23/22 1804 4 g at 09/23/22 1804   • multivitamin (THERAGRAN) tablet 1 tablet  1 tablet Oral Daily Brain Bentley MD   1 tablet at 10/03/22 0831   • nadolol (CORGARD) tablet 20 mg  20 mg Oral Q24H Brain Bentley MD   20 mg at 10/03/22 0914   • naloxone (NARCAN) injection 0.1 mg  0.1 mg Intravenous Q5 Min PRN Brain Bentley MD       • naloxone (NARCAN) injection 0.4 mg  0.4 mg Intravenous PRN Brain Bentley MD       • ondansetron (ZOFRAN) tablet 4 mg  4 mg Oral Q6H PRN Brain Bentley MD        Or   • ondansetron (ZOFRAN) injection 4 mg  4 mg Intravenous Q6H PRN Brain Bentley MD   4 mg at 09/22/22 1259   • oxyCODONE (ROXICODONE) immediate release tablet 7.5 mg  7.5 mg Oral Q4H PRN Lisa Morris MD       • potassium chloride (MICRO-K) CR capsule 40 mEq  40 mEq Oral PRN Cristy Farfan MD   40 mEq at 09/24/22 1750    Or   • potassium chloride (KLOR-CON) packet 40 mEq  40 mEq Oral PRN Cristy Farfan MD        Or   • potassium chloride 10 mEq in 100 mL IVPB  10 mEq Intravenous Q1H PRN Cristy Farfan MD       • [START ON 10/5/2022] potassium chloride (MICRO-K) CR capsule 20 mEq  20 mEq Oral Daily Nova Galeas, PATacosC       • sodium chloride  0.9 % flush 10 mL  10 mL Intravenous Q12H Brain Bentley MD   10 mL at 10/03/22 0914   • sodium chloride 0.9 % flush 10 mL  10 mL Intravenous PRN Brain Bentley MD       • sodium chloride 0.9 % infusion  125 mL/hr Intravenous Continuous Nova Galeas PA-C 125 mL/hr at 10/03/22 1115 125 mL/hr at 10/03/22 1115   • [START ON 10/5/2022] spironolactone (ALDACTONE) tablet 25 mg  25 mg Oral Daily Nova Galeas PA-C       • traZODone (DESYREL) tablet 50 mg  50 mg Oral Nightly Cristy Farfan MD   50 mg at 10/02/22 2002     Lab Results (last 48 hours)     Procedure Component Value Units Date/Time    CK [[id number]]  (Normal) Collected: 10/03/22 1032    Specimen: Blood Updated: 10/03/22 1132     Creatine Kinase 34 U/L     POC Glucose Once [[id number]]  (Abnormal) Collected: 10/03/22 1107    Specimen: Blood Updated: 10/03/22 1109     Glucose 176 mg/dL      Comment: Confirmed by Repeat Meter: [id number] : [id number] Bhavya Jean Baptiste       POC Glucose Once [[id number]]  (Normal) Collected: 10/03/22 0710    Specimen: Blood Updated: 10/03/22 0711     Glucose 110 mg/dL      Comment: Confirmed by Repeat Meter: [id number] : [id number] Bhavya Jean Baptiste       Folate [[id number]]  (Normal) Collected: 10/02/22 1004    Specimen: Blood Updated: 10/02/22 2124     Folate 16.40 ng/mL     Narrative:      Results may be falsely increased if patient taking Biotin.      Vitamin B12 [[id number]]  (Abnormal) Collected: 10/02/22 1004    Specimen: Blood Updated: 10/02/22 2124     Vitamin B-12 1,032 pg/mL     Narrative:      Results may be falsely increased if patient taking Biotin.      POC Glucose Once [[id number]]  (Abnormal) Collected: 10/02/22 2005    Specimen: Blood Updated: 10/02/22 2007     Glucose 143 mg/dL      Comment: Meter: [id number] : [id number] Jaiden Daugherty       POC Glucose Once [[id number]]  (Abnormal) Collected: 10/02/22 1633    Specimen: Blood Updated: 10/02/22 1636     Glucose 153 mg/dL      Comment: Confirmed  by Repeat Meter: [id number] : [id number] Bhavya Fionetti       POC Glucose Once [[id number]]  (Abnormal) Collected: 10/02/22 1127    Specimen: Blood Updated: 10/02/22 1130     Glucose 227 mg/dL      Comment: Confirmed by Repeat Meter: [id number] : [id number] Bhavya Fionetti       Ferritin [[id number]]  (Normal) Collected: 10/02/22 1004    Specimen: Blood Updated: 10/02/22 1125     Ferritin 308.80 ng/mL     Narrative:      Results may be falsely decreased if patient taking Biotin.      Comprehensive Metabolic Panel [[id number]]  (Abnormal) Collected: 10/02/22 1004    Specimen: Blood Updated: 10/02/22 1122     Glucose 258 mg/dL      BUN 15 mg/dL      Creatinine 0.70 mg/dL      Sodium 131 mmol/L      Potassium 4.5 mmol/L      Chloride 95 mmol/L      CO2 26.0 mmol/L      Calcium 8.5 mg/dL      Total Protein 6.4 g/dL      Albumin 2.50 g/dL      ALT (SGPT) 34 U/L      AST (SGOT) 49 U/L      Alkaline Phosphatase 320 U/L      Total Bilirubin 0.8 mg/dL      Globulin 3.9 gm/dL      Comment: Calculated Result        A/G Ratio 0.6 g/dL      BUN/Creatinine Ratio 21.4     Anion Gap 10.0 mmol/L      eGFR 107.5 mL/min/1.73      Comment: National Kidney Foundation and American Society of Nephrology (ASN) Task Force recommended calculation based on the Chronic Kidney Disease Epidemiology Collaboration (CKD-EPI) equation refit without adjustment for race.       Narrative:      GFR Normal >60  Chronic Kidney Disease <60  Kidney Failure <15      Iron Profile [[id number]]  (Abnormal) Collected: 10/02/22 1004    Specimen: Blood Updated: 10/02/22 1122     Iron 35 mcg/dL      Iron Saturation 13 %      Transferrin 179 mg/dL      TIBC 267 mcg/dL     Reticulocytes [[id number]]  (Abnormal) Collected: 10/02/22 1004    Specimen: Blood Updated: 10/02/22 1103     Reticulocyte % 3.13 %      Reticulocyte Absolute 0.1080 10*6/mm3     POC Glucose Once [[id number]]  (Abnormal) Collected: 10/02/22 0707    Specimen: Blood Updated: 10/02/22 0708     Glucose  236 mg/dL      Comment: Confirmed by Repeat Meter: [id number] : [id number] Bhavya Jean Baptiste             Imaging Results (Last 48 Hours)     ** No results found for the last 48 hours. **        Operative/Procedure Notes (last 48 hours)  Notes from 10/01/22 1624 through 10/03/22 1624   No notes of this type exist for this encounter.          Physician Progress Notes (last 48 hours)      Nova Galeas PA-C at 10/03/22 1130     Attestation signed by Juliana Zamora DO at 10/03/22 1500    I have reviewed this documentation and agree.                      [facility] Medicine Services  PROGRESS NOTE    Patient Name: Melchor Hardwick III  : 1965  MRN: [id number]    Date of Admission: 2022  Primary Care Physician: Missy Up APRN    Subjective     CC: f/u septic R knee     HPI:  Sitting in chair. Got dizzy and lightheaded again while getting into the chair. BP decreased from 97/57 sitting to 78/49 standing. Knee pain is controlled. Bowels moving well. Possibly to rehab tomorrow, insurance approval is still pending    ROS:  Gen- No fevers, chills  CV- No chest pain, palpitations  Resp- No cough, dyspnea  GI- No N/V/D, abd pain    Objective     Vital Signs:   Temp:  [98 °F (36.7 °C)-98.9 °F (37.2 °C)] 98.4 °F (36.9 °C)  Heart Rate:  [71-86] 78  Resp:  [15-16] 16  BP: (102-129)/(51-72) 102/60     Physical Exam:  Constitutional: No acute distress, awake, alert and conversant. Sitting in chair   HENT: NCAT, mucous membranes moist  Respiratory: Clear to auscultation bilaterally, respiratory effort normal   Cardiovascular: RRR, no murmurs, rubs, or gallops  Gastrointestinal: Positive bowel sounds, soft, nontender, nondistended  Musculoskeletal: No bilateral ankle edema. R knee incision dressed - I did not remove for exam. L midfoot amputation stump c/d/i without erythema, edema or induration   Psychiatric: Appropriate affect, cooperative  Neurologic: Oriented x 3, moves all  extremities spontaneously without focal deficts, speech clear and coherent     Results Reviewed:  LAB RESULTS:      Lab 09/29/22  1034 09/27/22  0412   WBC 6.09 6.67   HEMOGLOBIN 8.6* 8.2*   HEMATOCRIT 26.9* 25.3*   PLATELETS 199 158   MCV 77.7* 76.4*         Lab 10/02/22  1004 09/29/22  1034 09/27/22  0412   SODIUM 131* 130* 135*   POTASSIUM 4.5 4.6 4.5   CHLORIDE 95* 96* 98   CO2 26.0 29.0 29.0   ANION GAP 10.0 5.0 8.0   BUN 15 11 12   CREATININE 0.70* 0.59* 0.62*   EGFR 107.5 113.2 111.5   GLUCOSE 258* 232* 129*   CALCIUM 8.5* 8.2* 8.0*         Lab 10/02/22  1004 09/29/22  1034 09/27/22  0412   TOTAL PROTEIN 6.4 5.6* 5.1*   ALBUMIN 2.50* 2.40* 2.20*   GLOBULIN 3.9 3.2 2.9   ALT (SGPT) 34 34 19   AST (SGOT) 49* 61* 34   BILIRUBIN 0.8 0.8 0.6   ALK PHOS 320* 304* 203*         Lab 10/02/22  1004   IRON 35*   IRON SATURATION 13*   TIBC 267*   TRANSFERRIN 179*   FERRITIN 308.80   FOLATE 16.40   VITAMIN B 12 1,032*     Brief Urine Lab Results  (Last result in the past 365 days)      Color   Clarity   Blood   Leuk Est   Nitrite   Protein   CREAT   Urine HCG        09/21/22 0141 Yellow   Cloudy   Negative   Negative   Negative   Trace               Microbiology Results Abnormal     Procedure Component Value - Date/Time    Blood Culture - Blood, Arm, Left [[id number]]  (Normal) Collected: 09/26/22 1126    Lab Status: Final result Specimen: Blood from Arm, Left Updated: 10/01/22 1232     Blood Culture No growth at 5 days    Blood Culture - Blood, Leg, Left [[id number]]  (Normal) Collected: 09/26/22 1126    Lab Status: Final result Specimen: Blood from Leg, Left Updated: 10/01/22 1232     Blood Culture No growth at 5 days    Anaerobic Culture 10 Day Incubation - Tissue, Knee, Right [[id number]] Collected: 09/21/22 1543    Lab Status: Final result Specimen: Tissue from Knee, Right Updated: 10/01/22 0648     Anaerobic Culture No anaerobes isolated at 10 days    Anaerobic Culture 10 Day Incubation - Tissue, Knee, Right  [[id number]] Collected: 09/21/22 1543    Lab Status: Final result Specimen: Tissue from Knee, Right Updated: 10/01/22 0648     Anaerobic Culture No anaerobes isolated at 10 days    Anaerobic Culture 10 Day Incubation - Synovium, Knee, Right [[id number]] Collected: 09/21/22 1541    Lab Status: Final result Specimen: Synovium from Knee, Right Updated: 10/01/22 0648     Anaerobic Culture No anaerobes isolated at 10 days    Fungus Culture - Synovium, Knee, Right [[id number]] Collected: 09/21/22 1541    Lab Status: Preliminary result Specimen: Synovium from Knee, Right Updated: 09/28/22 1748     Fungus Culture No fungus isolated at 1 week    Fungus Culture - Tissue, Knee, Right [[id number]] Collected: 09/21/22 1543    Lab Status: Preliminary result Specimen: Tissue from Knee, Right Updated: 09/28/22 1748     Fungus Culture No fungus isolated at 1 week    Fungus Culture - Tissue, Knee, Right [[id number]] Collected: 09/21/22 1543    Lab Status: Preliminary result Specimen: Tissue from Knee, Right Updated: 09/28/22 1748     Fungus Culture No fungus isolated at 1 week    AFB Culture - Synovium, Knee, Right [[id number]] Collected: 09/21/22 1541    Lab Status: Preliminary result Specimen: Synovium from Knee, Right Updated: 09/28/22 1748     AFB Culture No AFB isolated at 1 week     AFB Stain No acid fast bacilli seen on concentrated smear        No radiology results from the last 24 hrs    Results for orders placed during the hospital encounter of 09/20/22    Adult Transesophageal Echo (TYRESE) W/ Cont if Necessary Per Protocol    Interpretation Summary  · Normal left ventricular cavity size and wall thickness noted. All left ventricular wall segments contract normally. Estimated EF 60%.  · Normal RV structure and function  · Normal mitral valve with no significant stenosis or regurgitation  · Normal aortic valve with no significant stenosis or regurgitation, valve is trileaflet.  · Tricuspid valve has mild regurgitation, no evidence  of vegetation on this exam  · Pulmonic valve is normal with no significant regurgitation seen in normal leaflet opening.  · No source of intracardiac infection identified. If there remains high clinical suspicion for infective endocarditis consider repeat study in 4 to 5 days.    I have reviewed the medications:  Scheduled Meds:acetaminophen, 1,000 mg, Oral, Q8H  aspirin, 81 mg, Oral, Q12H  DAPTOmycin, 8 mg/kg (Adjusted), Intravenous, Q24H  ferric gluconate, 250 mg, Intravenous, Daily  [START ON 10/5/2022] furosemide, 40 mg, Oral, Daily  insulin detemir, 24 Units, Subcutaneous, Q12H  insulin lispro, 0-9 Units, Subcutaneous, TID AC  Insulin Lispro, 18 Units, Subcutaneous, TID With Meals  multivitamin, 1 tablet, Oral, Daily  nadolol, 20 mg, Oral, Q24H  [START ON 10/5/2022] potassium chloride, 20 mEq, Oral, Daily  senna-docusate sodium, 2 tablet, Oral, BID  sodium chloride, 10 mL, Intravenous, Q12H  [START ON 10/5/2022] spironolactone, 25 mg, Oral, Daily  traZODone, 50 mg, Oral, Nightly      Continuous Infusions:ropivacaine,   sodium chloride, 125 mL/hr, Last Rate: 125 mL/hr (10/03/22 1115)      PRN Meds:.•  acetaminophen **OR** acetaminophen **OR** acetaminophen  •  senna-docusate sodium **AND** polyethylene glycol **AND** bisacodyl **AND** bisacodyl  •  dextrose  •  dextrose  •  diphenhydrAMINE **OR** diphenhydrAMINE  •  glucagon (human recombinant)  •  hydrOXYzine  •  magnesium sulfate **OR** magnesium sulfate **OR** magnesium sulfate  •  [] HYDROmorphone **AND** naloxone  •  naloxone  •  ondansetron **OR** ondansetron  •  oxyCODONE  •  potassium chloride **OR** potassium chloride **OR** potassium chloride  •  sodium chloride  •  sodium chloride    Assessment & Plan     Active Hospital Problems    Diagnosis  POA   • **Pyogenic arthritis of right knee joint, due to unspecified organism (HCC) [M00.9]  Yes   • MRSA bacteremia [R78.81, B95.62]  Yes   • 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     Brief Hospital Course to date:  Melchor Hardwick III is a 57 y.o. male with PMH significant for HTN, HLD, insulin-dependent DMII, PAZ cirrhosis, chronic anemia and obesity. Recently admitted to [facility] 5/10-6/2/22 for sepsis secondary to L foot gas gangrene/osteomyelitis with associated MRSA bacteremia. He required L midfoot amputation. He was diagnosed with PAZ cirrhosis with ascites / portal hypertension during this admission. He transferred to [facility] acute rehab 6/2-6/16/22. Evaluated by PCP 9/15/22 due to R knee pain. Presented to  ED 9/21/22 due to worsening R knee pain. Found to have R knee septic arthritis with associated MRSA bacteremia and tricuspid valve endocarditis.     He underwent I&D with Dr. Bentley on 9/22/22, gross purulence noted. Both surgical and blood cultures have grown MRSA. TTE revealed a possible tricuspid valve vegetation. CT Surgery consulted for TYRESE, which did not show vegetation. CTS has signed off. Infectious Disease is following.  Planning for 6 wks of IV daptomycin with TTE in 2-4 weeks.     R knee septic arthritis  MRSA bacteremia  Tricuspid valve endocarditis  - s/p knee aspiration in ED - > 50K WBCs on micro  - s/p R knee I&D by Dr. Bentley 9/22/22 - gross purulence encountered intraoperatively. Cultures (+) MRSA  - 9/20/22 blood cultures growing MRSA.   - Repeat blood cultures from 9/26/22 - NGTD   - 9/23/22 TTE concerning for 1 x 0.4cm mobile echodensity on tricuspid valve, concerning for vegetation  - 9/26/22 TTE did not show vegetation. CT surgery has evaluated and signed off  - Appreciate ID assistance. Will need 6-8 weeks of IV Daptomycin. Repeat TTE in 2-4 weeks    Orthostatic hypotension  - New issue  this admission  - Hold Lasix tomorrow. Decrease from 40mg BID to daily  - Decrease Spironolactone to 25mg daily and hold tomorrow  - Give NS 125mL/hr x 20 hours  - AM BMP  - Orthostatics in AM      Hypokalemia  Hypomagenesemia  - Replace per protocol  - Continue scheduled potassium replacement      Chronic anemia   Thrombocytopenia, resolved  - Iron studies consistent with iron deficiency with anemia of chronic disease  - Continue IV iron daily x 5 days or until DC. Then transition to PO supplement  - Folate / B12 levels normal     Poorly-controlled DMII  - Hgb A1c 8.8%  - DC Metformin - contraindicated with cirrhosis  - Continue Levemir 24 units BID, Lispro 18 units TID AC + SSI   - DM educator has seen    R buttock wound  - Present on arrival, now with skin tear  - WOC following       PAZ cirrhosis  - Diagnosed in June 2022 during recent admission at [facility]  - Now issues with orthostatic hypotension  - Decreased Lasix to 40mg daily (from BID)  - Decrease Spironolactine to 25mg daily  - Continue Nadolol 20mg daily  - Outpatient GI follow up as previously planned, will need GI referral at discharge     Recent osteomyelitis of left foot with MRSA bacteremia, s/p amputation left foot May 2022  - Was hospitalized at [facility] due to left foot osteomyelitis, initially had a washout and IV antibiotics but due to no improvement, ultimately had midfoot amputation performed by Podiatry on 5/18/22. He had MRSA bacteremia associated with this and completed 8 week course of IV Vancomycin.     Constipation, resolved  - Continue bowel regimen     Insomnia  Anxiety  - Continue Trazodone 50 mg QHS and PRN Hydroxyzine TID     Expected Discharge Location and Transportation: [facility] pending insurance approval   Expected Discharge Date: 10/4/22 @ 1100    DVT prophylaxis:Mechanical DVT prophylaxis orders are present.     AM-PAC 6 Clicks Score (PT): 15 (10/03/22 1032)    CODE STATUS:   Code Status and Medical Interventions:    Ordered at: 09/21/22 1645     Level Of Support Discussed With:    Patient     Code Status (Patient has no pulse and is not breathing):    CPR (Attempt to Resuscitate)     Medical Interventions (Patient has pulse or is breathing):    Full     Nova Galeas PA-C  10/03/22                Electronically signed by Juliana Zamora DO at 10/03/22 1500     Bryce Euceda MD at 10/03/22 0825          [facility] Progress Note        Antibiotics:  Anti-Infectives (From admission, onward)    Ordered     Dose/Rate Route Frequency Start Stop    09/25/22 0753  DAPTOmycin (CUBICIN) 650 mg in sodium chloride 0.9 % 50 mL IVPB        Ordering Provider: Bryce Euceda MD    8 mg/kg × 83 kg (Adjusted)  100 mL/hr over 30 Minutes Intravenous Every 24 Hours 09/25/22 0900 11/06/22 0859    09/21/22 1358  ceFAZolin in dextrose (ANCEF) IVPB solution 2 g        Ordering Provider: Brain Bentley MD    2 g  over 30 Minutes Intravenous Once 09/21/22 1445 09/21/22 1512    09/21/22 0204  vancomycin 2000 mg/500 mL 0.9% NS IVPB ([facility])        Ordering Provider: Ronen Payan DO    2,000 mg  over 120 Minutes Intravenous Once 09/21/22 0300 09/21/22 0554          CC: fatigue    HPI:      Patient is a  57 y.o.  Yr old male with history cirrhosis/diabetes and other comorbidity as outlined below.  History of left foot gas gangrene with osteomyelitis and MRSA bacteremia treated in [place] by Dr. Giordano in May/June 2022.  Family reports left transmetatarsal amputation in approximately 8 weeks IV vancomycin.  Notes from [place] indicate transthoracic echocardiogram no vegetation per Dr. Giordano; he thinks he might of had several weeks of oral antibiotic after that but not entirely clear.  He is admitted to [facility] September 20 with progressive right knee pain occurring over the past week preceding admission.  He thinks he might of twisted it but ended up with progressive redness/swelling and pain.  No specific blunt force or  penetrating trauma.  No animal insect arthropod bite.  No open wounds or active drainage.  No prior history of VRE C. difficile or ESBL/K PC/CRE organisms; he was evaluated by orthopedics and taken to the operating room for right knee incision/drainage and concern for septic arthritis per Dr. Bentley; blood cultures have shown gram-positive cocci and initial PCR data suggesting MRSA.  Had dysuria but urinalysis not consistent with UTI.  No hematuria or pyuria    9/23 with TV echodensity     9/26/22  TYRESE no vegetation per cardiology    10/3/22   no oxygen;  Per nursing,  no new focal pain or distress; postop Right knee pain is  dull at present,  Better controlled per nursing overall,   nonradiating, worse with movement, better with pain meds and 2  out of 10 in severity at present; he denies any other focal musculoskeletal pain.  No back pain. No myalgia     Left foot with no redness/swelling or pain.  Surgical site healed with no open wound or active drainage.     No headache photophobia or neck stiffness.  No nausea vomiting diarrhea or abdominal pain.  No shortness of breath cough or hemoptysis.  No other new skin rash.  No other recent procedures or interventions.  No indwelling prosthetic material otherwise.  No indwelling pacemaker chronic lines       ROS:      10/3/22 per nursing; No f/c/s. No n/v/d. No rash. No new ADR to Abx.       Constitutional-- No Fever, chills or sweats.  Appetite diminished with fatigue.  Heent-- No new vision, hearing or throat complaints.  No epistaxis or oral sores.  Denies odynophagia or dysphagia.  No flashers, floaters or eye pain.   No headache, photophobia or neck stiffness.  CV-- No chest pain, palpitation or syncope  Resp-- No SOB/cough/Hemoptysis  GI- No nausea, vomiting, or diarrhea.  No hematochezia, melena, or hematemesis. Denies jaundice  -- No dysuria, hematuria, or flank pain.  Denies hesitancy, urgency or flank pain.  Lymph- no swollen lymph nodes in neck/axilla or  "groin.   Heme- No active bruising or bleeding; no Hx of DVT or PE.  MS-- no swelling or pain in the bones or joints of arms/legs.  No new back pain.  Neuro-- No acute focal weakness or numbness in the arms or legs.  No seizures.     Full 12 point review of systems reviewed and negative otherwise for acute complaints, except for above       PE:   /63 (BP Location: Left arm, Patient Position: Lying)   Pulse 86   Temp 98.2 °F (36.8 °C) (Oral)   Resp 15   Ht 177.8 cm (70\")   Wt 98 kg (216 lb)   SpO2 97%   BMI 30.99 kg/m²       GENERAL: sleepy, in no acute distress.  Chronically ill-appearing  HEENT: Normocephalic, atraumatic.   No conjunctival injection. No icterus. Oropharynx clear without evidence of thrush or exudate. No evidence of peridontal disease.    NECK: Supple without nuchal rigidity. No mass.   HEART: RRR; soft murmur LSB, rubs, gallops.   LUNGS: Diminished at bases but otherwise clear to auscultation bilaterally without wheezing, rales, rhonchi. Normal respiratory effort. Nonlabored. No dullness.  ABDOMEN: Soft, nontender, nondistended. Positive bowel sounds. No rebound or guarding. NO mass or HSM.  EXT:  No cyanosis, clubbing or edema. No cord.   MSK: FROM without joint effusions noted arms/legs.    SKIN: Warm and dry without cutaneous eruptions on Inspection/palpation.    NEURO: sleepy     Left foot with no redness, induration or warmth.  No discrete mass bulge or fluctuance.  No crepitus or bulla.  Prior surgical site healed at [facility] by [facility].  No open wound or active drainage; no tenderness     Right knee postop surgical noted.  no new redness induration or warmth.  No discrete mass bulge or fluctuance.  No crepitus or bulla.     No peripheral stigmata/phenomena of endocarditis     IV without obvious redness or drainage    Laboratory Data    Results from last 7 days   Lab Units 09/29/22  1034 09/27/22  0412   WBC 10*3/mm3 6.09 6.67   HEMOGLOBIN g/dL 8.6* 8.2*   HEMATOCRIT % 26.9* 25.3*   PLATELETS " 10*3/mm3 199 158     Results from last 7 days   Lab Units 10/02/22  1004   SODIUM mmol/L 131*   POTASSIUM mmol/L 4.5   CHLORIDE mmol/L 95*   CO2 mmol/L 26.0   BUN mg/dL 15   CREATININE mg/dL 0.70*   GLUCOSE mg/dL 258*   CALCIUM mg/dL 8.5*     Results from last 7 days   Lab Units 10/02/22  1004   ALK PHOS U/L 320*   BILIRUBIN mg/dL 0.8   ALT (SGPT) U/L 34   AST (SGOT) U/L 49*               Estimated Creatinine Clearance: 136.7 mL/min (A) (by C-G formula based on SCr of 0.7 mg/dL (L)).      Microbiology:      Radiology:  Imaging Results (Last 72 Hours)     ** No results found for the last 72 hours. **            Impression:         --Acute MRSA bacteremia/septicemia, recurrent with prior history positive cultures May 2022 and recurrent despite prior 8 weeks of IV vancomycin and left foot surgery in addition to other supportive measures.   TTE with ? TV echodensity;   IV daptomycin 8 mg/kg initiated with pharmacy assisting with dosing.  High risk for further serious morbidity and other serious sequela including persistent/progressive or recurrent infection, metastatic foci of involvement and other dire consequences. TYRESE NO VEGETATION per cardiology; at risk for occult endovascular focus unable to confirm so far.  Nonetheless, given circumstances he will receive ongoing treatment empirically for potential occult endovascular focus     --Acute right knee pain/septic arthritis with surgical intervention, incision/debridement by Dr. Bentley on September 21.   MRSA+ in the setting of MRSA bacteremia.     --History of cirrhosis by records.  Unclear etiology.   further GI referral/eval per  medicine team     --Diabetes.  You need to tightly control blood sugar to give best chance for healing.;  Described as uncontrolled by medicine team at admission    --TCP better     PLAN:       -- IV daptomycin likely at least 6 weeks, potentially step down to oral abx after that depending on clinical course     -- Check/review labs  cultures and scans     -- Partial history per nursing staff       -- Highly complex set of issues with high risk for further serious morbidity and other serious sequela    --TYRESE noted; consider repeat TTE 2-4 weeks to evaluate for any evolving change;  Sooner if clinically worse    --anticipate rehab    -- case management looking into options    Bryce Euceda MD  10/3/2022        Electronically signed by Bryce Euceda MD at 10/03/22 0830     Nova Galeas PA-C at 10/02/22 1354     Attestation signed by Lisa Morris MD at 10/02/22 1637      Attending Luis BLANDON supervised care of the patient on day of service with direct care provided by the advanced care provider (APC).    Lisa Morris MD  10/02/22                          [facility] Medicine Services  PROGRESS NOTE    Patient Name: Melchor Hardwick III  : 1965  MRN: [id number]    Date of Admission: 2022  Primary Care Physician: Missy Up APRN    Subjective     CC: f/u septic R knee     HPI:  In bed. Feeling okay. Says he got dizzy / lightheaded when trying to get out of bed yesterday evening. Got 500mL bolus with improvement. He thinks this has happened more than once since admission. No chest pain or dyspnea. No abdominal pain, nausea or vomiting. Bowels moving fine. Knee hurts but pain is manageable with current regimen. Hopes to go to rehab Tuesday.     ROS:  Gen- No fevers, chills  CV- No chest pain, palpitations  Resp- No cough, dyspnea  GI- No N/V/D, abd pain    Objective     Vital Signs:   Temp:  [98 °F (36.7 °C)-98.8 °F (37.1 °C)] 98 °F (36.7 °C)  Heart Rate:  [68-75] 72  Resp:  [14-18] 16  BP: (104-131)/(47-64) 112/63     Physical Exam:  Constitutional: No acute distress, awake, alert and conversant. Laying in bed   HENT: NCAT, mucous membranes moist  Respiratory: Clear to auscultation bilaterally, respiratory effort normal   Cardiovascular: RRR, no murmurs, rubs, or  gallops  Gastrointestinal: Positive bowel sounds, soft, nontender, nondistended  Musculoskeletal: No bilateral ankle edema. R knee incision dressed - I did not remove for exam. L midfoot amputation stump c/d/i without erythema, edema or induration   Psychiatric: Appropriate affect, cooperative  Neurologic: Oriented x 3, moves all extremities spontaneously without focal deficts, speech clear and coherent     Results Reviewed:  LAB RESULTS:      Lab 09/29/22  1034 09/27/22  0412   WBC 6.09 6.67   HEMOGLOBIN 8.6* 8.2*   HEMATOCRIT 26.9* 25.3*   PLATELETS 199 158   MCV 77.7* 76.4*         Lab 10/02/22  1004 09/29/22  1034 09/27/22  0412   SODIUM 131* 130* 135*   POTASSIUM 4.5 4.6 4.5   CHLORIDE 95* 96* 98   CO2 26.0 29.0 29.0   ANION GAP 10.0 5.0 8.0   BUN 15 11 12   CREATININE 0.70* 0.59* 0.62*   EGFR 107.5 113.2 111.5   GLUCOSE 258* 232* 129*   CALCIUM 8.5* 8.2* 8.0*         Lab 10/02/22  1004 09/29/22  1034 09/27/22  0412   TOTAL PROTEIN 6.4 5.6* 5.1*   ALBUMIN 2.50* 2.40* 2.20*   GLOBULIN 3.9 3.2 2.9   ALT (SGPT) 34 34 19   AST (SGOT) 49* 61* 34   BILIRUBIN 0.8 0.8 0.6   ALK PHOS 320* 304* 203*         Lab 10/02/22  1004   IRON 35*   IRON SATURATION 13*   TIBC 267*   TRANSFERRIN 179*   FERRITIN 308.80     Brief Urine Lab Results  (Last result in the past 365 days)      Color   Clarity   Blood   Leuk Est   Nitrite   Protein   CREAT   Urine HCG        09/21/22 0141 Yellow   Cloudy   Negative   Negative   Negative   Trace               Microbiology Results Abnormal     Procedure Component Value - Date/Time    Blood Culture - Blood, Arm, Left [[id number]]  (Normal) Collected: 09/26/22 1126    Lab Status: Final result Specimen: Blood from Arm, Left Updated: 10/01/22 1232     Blood Culture No growth at 5 days    Blood Culture - Blood, Leg, Left [[id number]]  (Normal) Collected: 09/26/22 1126    Lab Status: Final result Specimen: Blood from Leg, Left Updated: 10/01/22 1232     Blood Culture No growth at 5 days    Anaerobic  Culture 10 Day Incubation - Tissue, Knee, Right [[id number]] Collected: 09/21/22 1543    Lab Status: Final result Specimen: Tissue from Knee, Right Updated: 10/01/22 0648     Anaerobic Culture No anaerobes isolated at 10 days    Anaerobic Culture 10 Day Incubation - Tissue, Knee, Right [[id number]] Collected: 09/21/22 1543    Lab Status: Final result Specimen: Tissue from Knee, Right Updated: 10/01/22 0648     Anaerobic Culture No anaerobes isolated at 10 days    Anaerobic Culture 10 Day Incubation - Synovium, Knee, Right [[id number]] Collected: 09/21/22 1541    Lab Status: Final result Specimen: Synovium from Knee, Right Updated: 10/01/22 0648     Anaerobic Culture No anaerobes isolated at 10 days    Fungus Culture - Synovium, Knee, Right [[id number]] Collected: 09/21/22 1541    Lab Status: Preliminary result Specimen: Synovium from Knee, Right Updated: 09/28/22 1748     Fungus Culture No fungus isolated at 1 week    Fungus Culture - Tissue, Knee, Right [[id number]] Collected: 09/21/22 1543    Lab Status: Preliminary result Specimen: Tissue from Knee, Right Updated: 09/28/22 1748     Fungus Culture No fungus isolated at 1 week    Fungus Culture - Tissue, Knee, Right [[id number]] Collected: 09/21/22 1543    Lab Status: Preliminary result Specimen: Tissue from Knee, Right Updated: 09/28/22 1748     Fungus Culture No fungus isolated at 1 week    AFB Culture - Synovium, Knee, Right [[id number]] Collected: 09/21/22 1541    Lab Status: Preliminary result Specimen: Synovium from Knee, Right Updated: 09/28/22 1748     AFB Culture No AFB isolated at 1 week     AFB Stain No acid fast bacilli seen on concentrated smear        No radiology results from the last 24 hrs    Results for orders placed during the hospital encounter of 09/20/22    Adult Transesophageal Echo (TYRESE) W/ Cont if Necessary Per Protocol    Interpretation Summary  · Normal left ventricular cavity size and wall thickness noted. All left ventricular wall segments  contract normally. Estimated EF 60%.  · Normal RV structure and function  · Normal mitral valve with no significant stenosis or regurgitation  · Normal aortic valve with no significant stenosis or regurgitation, valve is trileaflet.  · Tricuspid valve has mild regurgitation, no evidence of vegetation on this exam  · Pulmonic valve is normal with no significant regurgitation seen in normal leaflet opening.  · No source of intracardiac infection identified. If there remains high clinical suspicion for infective endocarditis consider repeat study in 4 to 5 days.    I have reviewed the medications:  Scheduled Meds:acetaminophen, 1,000 mg, Oral, Q8H  aspirin, 81 mg, Oral, Q12H  DAPTOmycin, 8 mg/kg (Adjusted), Intravenous, Q24H  furosemide, 40 mg, Oral, Daily  insulin detemir, 24 Units, Subcutaneous, Q12H  insulin lispro, 0-9 Units, Subcutaneous, TID AC  Insulin Lispro, 18 Units, Subcutaneous, TID With Meals  multivitamin, 1 tablet, Oral, Daily  nadolol, 20 mg, Oral, Q24H  [START ON 10/3/2022] potassium chloride, 20 mEq, Oral, Daily  senna-docusate sodium, 2 tablet, Oral, BID  sodium chloride, 10 mL, Intravenous, Q12H  spironolactone, 50 mg, Oral, Daily  traZODone, 50 mg, Oral, Nightly      Continuous Infusions:ropivacaine,       PRN Meds:.•  acetaminophen **OR** acetaminophen **OR** acetaminophen  •  senna-docusate sodium **AND** polyethylene glycol **AND** bisacodyl **AND** bisacodyl  •  dextrose  •  dextrose  •  diphenhydrAMINE **OR** diphenhydrAMINE  •  glucagon (human recombinant)  •  hydrOXYzine  •  magnesium sulfate **OR** magnesium sulfate **OR** magnesium sulfate  •  [] HYDROmorphone **AND** naloxone  •  naloxone  •  ondansetron **OR** ondansetron  •  oxyCODONE  •  potassium chloride **OR** potassium chloride **OR** potassium chloride  •  sodium chloride  •  sodium chloride    Assessment & Plan     Active Hospital Problems    Diagnosis  POA   • **Pyogenic arthritis of right knee joint, due to unspecified  organism (HCC) [M00.9]  Yes   • MRSA bacteremia [R78.81, B95.62]  Yes   • 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     Brief Hospital Course to date:  Melchor Hardwick III is a 57 y.o. male with PMH significant for HTN, HLD, insulin-dependent DMII, PAZ cirrhosis, chronic anemia and obesity. Recently admitted to [facility] 5/10-6/2/22 for sepsis secondary to L foot gas gangrene/osteomyelitis with associated MRSA bacteremia. He required L midfoot amputation. He was diagnosed with PAZ cirrhosis with ascites / portal hypertension during this admission. He transferred to [facility] acute rehab 6/2-6/16/22. Evaluated by PCP 9/15/22 due to R knee pain. Presented to [facility] ED 9/21/22 due to worsening R knee pain. Found to have R knee septic arthritis with associated MRSA bacteremia and tricuspid valve endocarditis.     He underwent I&D with Dr. Bentley on 9/22/22, gross purulence noted. Both surgical and blood cultures have grown MRSA. TTE revealed a possible tricuspid valve vegetation. CT Surgery consulted for TYRESE, which did not show vegetation. CTS has signed off. Infectious Disease is following.  Planning for 6 wks of IV daptomycin with TTE in 2-4 weeks.     R knee septic arthritis  MRSA bacteremia  Tricuspid valve endocarditis  - s/p knee aspiration in ED - > 50K WBCs on micro  - s/p R knee I&D by Dr. Bentley 9/22/22 - gross purulence encountered intraoperatively. Cultures (+) MRSA  - 9/20/22 blood cultures growing MRSA.   - Repeat blood cultures from 9/26/22 - NGTD   - 9/23/22 TTE concerning for 1 x 0.4cm mobile echodensity on tricuspid valve, concerning for vegetation  - 9/26/22 TTE did not show vegetation. CT surgery has evaluated and  signed off  - Appreciate ID assistance. Will need 6-8 weeks of IV Daptomycin. Repeat TTE in 2-4 weeks     Hypokalemia  Hypomagenesemia  - Replace per protocol  - Lasix dose decreased to daily, will decrease potassium as well     Chronic anemia   Thrombocytopenia, resolved  - Iron studies consistent with iron deficiency with anemia of chronic disease  - Give IV iron daily x 5 days or until DC. Then transition to PO supplement  - Folate / B12 levels pending     Poorly-controlled DMII  - Hgb A1c 8.8%  - DC Metformin - contraindicated with cirrhosis  - Increase Levemir to 24 units BID, continue Lispro 18 units TID AC + SSI   - DM educator has seen    R buttock wound  - Present on arrival, now with skin tear  - WOC following       PAZ cirrhosis  - Diagnosed in June 2022 during recent admission at [facility] [place]  - Orthostatic yesterday evening  - Decrease Lasix to 40mg daily (from BID)  - Continue Spironolactine 50mg daily, Nadolol 20mg daily  - Outpatient GI follow up as previously planned, will need GI referral at discharge     Recent osteomyelitis of left foot with MRSA bacteremia, s/p amputation left foot May 2022  - Was hospitalized at [facility] due to left foot osteomyelitis, initially had a washout and IV antibiotics but due to no improvement, ultimately had midfoot amputation performed by Podiatry on 5/18/22. He had MRSA bacteremia associated with this and completed 8 week course of IV Vancomycin.     Constipation, resolved  - Continue bowel regimen     Insomnia  Anxiety  - Continue Trazodone 50 mg QHS and PRN Hydroxyzine TID     Expected Discharge Location and Transportation: [facility] pending insurance approval   Expected Discharge Date: 10/4/22 @ 0900    DVT prophylaxis:Mechanical DVT prophylaxis orders are present.     AM-PAC 6 Clicks Score (PT): 16 (10/02/22 0800)    CODE STATUS:   Code Status and Medical Interventions:   Ordered at: 09/21/22 7638     Level Of Support Discussed With:    Patient     Code Status  (Patient has no pulse and is not breathing):    CPR (Attempt to Resuscitate)     Medical Interventions (Patient has pulse or is breathing):    Full     Nvoa Galeas PA-C  10/02/22                Electronically signed by Lisa Morris MD at 10/02/22 1637     Bryce Euceda MD at 10/02/22 1127          [facility] Progress Note        Antibiotics:  Anti-Infectives (From admission, onward)    Ordered     Dose/Rate Route Frequency Start Stop    09/25/22 0753  DAPTOmycin (CUBICIN) 650 mg in sodium chloride 0.9 % 50 mL IVPB        Ordering Provider: Bryce Euceda MD    8 mg/kg × 83 kg (Adjusted)  100 mL/hr over 30 Minutes Intravenous Every 24 Hours 09/25/22 0900 11/06/22 0859    09/21/22 1358  ceFAZolin in dextrose (ANCEF) IVPB solution 2 g        Ordering Provider: Brain Bentley MD    2 g  over 30 Minutes Intravenous Once 09/21/22 1445 09/21/22 1512    09/21/22 0204  vancomycin 2000 mg/500 mL 0.9% NS IVPB ([facility])        Ordering Provider: Ronen Payan DO    2,000 mg  over 120 Minutes Intravenous Once 09/21/22 0300 09/21/22 0554          CC: fatigue    HPI:      Patient is a  57 y.o.  Yr old male with history cirrhosis/diabetes and other comorbidity as outlined below.  History of left foot gas gangrene with osteomyelitis and MRSA bacteremia treated in [place] by Dr. Giordano in May/June 2022.  Family reports left transmetatarsal amputation in approximately 8 weeks IV vancomycin.  Notes from [place] indicate transthoracic echocardiogram no vegetation per Dr. Giordano; he thinks he might of had several weeks of oral antibiotic after that but not entirely clear.  He is admitted to [facility] September 20 with progressive right knee pain occurring over the past week preceding admission.  He thinks he might of twisted it but ended up with progressive redness/swelling and pain.  No specific blunt force or penetrating trauma.  No animal insect arthropod bite.  No open wounds or active drainage.   No prior history of VRE C. difficile or ESBL/K PC/CRE organisms; he was evaluated by orthopedics and taken to the operating room for right knee incision/drainage and concern for septic arthritis per Dr. Bentley; blood cultures have shown gram-positive cocci and initial PCR data suggesting MRSA.  Had dysuria but urinalysis not consistent with UTI.  No hematuria or pyuria    9/23 with TV echodensity     9/26/22  TYRESE no vegetation per cardiology    10/2/22  Seen early and sleepy;  Case management working on options; no oxygen;  Per nursing,  no new focal pain or distress; postop Right knee pain is  dull at present,  Better controlled per nursing,   nonradiating, worse with movement, better with pain meds and 2  out of 10 in severity at present; he denies any other focal musculoskeletal pain.  No back pain. No myalgia     Left foot with no redness/swelling or pain.  Surgical site healed with no open wound or active drainage.     No headache photophobia or neck stiffness.  No nausea vomiting diarrhea or abdominal pain.  No shortness of breath cough or hemoptysis.  No other new skin rash.  No other recent procedures or interventions.  No indwelling prosthetic material otherwise.  No indwelling pacemaker chronic lines       ROS:      10/2/22 per nursing; No f/c/s. No n/v/d. No rash. No new ADR to Abx.       Constitutional-- No Fever, chills or sweats.  Appetite diminished with fatigue.  Heent-- No new vision, hearing or throat complaints.  No epistaxis or oral sores.  Denies odynophagia or dysphagia.  No flashers, floaters or eye pain.   No headache, photophobia or neck stiffness.  CV-- No chest pain, palpitation or syncope  Resp-- No SOB/cough/Hemoptysis  GI- No nausea, vomiting, or diarrhea.  No hematochezia, melena, or hematemesis. Denies jaundice  -- No dysuria, hematuria, or flank pain.  Denies hesitancy, urgency or flank pain.  Lymph- no swollen lymph nodes in neck/axilla or groin.   Heme- No active bruising or  "bleeding; no Hx of DVT or PE.  MS-- no swelling or pain in the bones or joints of arms/legs.  No new back pain.  Neuro-- No acute focal weakness or numbness in the arms or legs.  No seizures.     Full 12 point review of systems reviewed and negative otherwise for acute complaints, except for above       PE:   /63   Pulse 73   Temp 98.1 °F (36.7 °C) (Oral)   Resp 14   Ht 177.8 cm (70\")   Wt 98 kg (216 lb)   SpO2 97%   BMI 30.99 kg/m²       GENERAL: sleepy, in no acute distress.  Chronically ill-appearing  HEENT: Normocephalic, atraumatic.   No conjunctival injection. No icterus. Oropharynx clear without evidence of thrush or exudate. No evidence of peridontal disease.    NECK: Supple without nuchal rigidity. No mass.   HEART: RRR; soft murmur LSB, rubs, gallops.   LUNGS: Diminished at bases but otherwise clear to auscultation bilaterally without wheezing, rales, rhonchi. Normal respiratory effort. Nonlabored. No dullness.  ABDOMEN: Soft, nontender, nondistended. Positive bowel sounds. No rebound or guarding. NO mass or HSM.  EXT:  No cyanosis, clubbing or edema. No cord.   MSK: FROM without joint effusions noted arms/legs.    SKIN: Warm and dry without cutaneous eruptions on Inspection/palpation.    NEURO: sleepy     Left foot with no redness, induration or warmth.  No discrete mass bulge or fluctuance.  No crepitus or bulla.  Prior surgical site healed at [facility].  No open wound or active drainage; no tenderness     Right knee postop surgical site covered.  Exposed skin without obvious redness induration or warmth.  No discrete mass bulge or fluctuance.  No crepitus or bulla.     No peripheral stigmata/phenomena of endocarditis     IV without obvious redness or drainage    Laboratory Data    Results from last 7 days   Lab Units 09/29/22  1034 09/27/22  0412   WBC 10*3/mm3 6.09 6.67   HEMOGLOBIN g/dL 8.6* 8.2*   HEMATOCRIT % 26.9* 25.3*   PLATELETS 10*3/mm3 199 158     Results from last 7 days   Lab Units " 10/02/22  1004   SODIUM mmol/L 131*   POTASSIUM mmol/L 4.5   CHLORIDE mmol/L 95*   CO2 mmol/L 26.0   BUN mg/dL 15   CREATININE mg/dL 0.70*   GLUCOSE mg/dL 258*   CALCIUM mg/dL 8.5*     Results from last 7 days   Lab Units 10/02/22  1004   ALK PHOS U/L 320*   BILIRUBIN mg/dL 0.8   ALT (SGPT) U/L 34   AST (SGOT) U/L 49*               Estimated Creatinine Clearance: 136.7 mL/min (A) (by C-G formula based on SCr of 0.7 mg/dL (L)).      Microbiology:      Radiology:  Imaging Results (Last 72 Hours)     ** No results found for the last 72 hours. **            Impression:         --Acute MRSA bacteremia/septicemia, recurrent with prior history positive cultures May 2022 and recurrent despite prior 8 weeks of IV vancomycin and left foot surgery in addition to other supportive measures.   TTE with ? TV echodensity;   IV daptomycin 8 mg/kg initiated with pharmacy assisting with dosing.  High risk for further serious morbidity and other serious sequela including persistent/progressive or recurrent infection, metastatic foci of involvement and other dire consequences. TYRESE NO VEGETATION per cardiology; at risk for occult endovascular focus unable to confirm so far.  Nonetheless, given circumstances he will receive ongoing treatment empirically for potential occult endovascular focus     --Acute right knee pain/septic arthritis with surgical intervention, incision/debridement by Dr. Bentley on September 21.   MRSA+ in the setting of MRSA bacteremia.     --History of cirrhosis by records.  Unclear etiology.   further GI referral/eval per  medicine team     --Diabetes.  You need to tightly control blood sugar to give best chance for healing.;  Described as uncontrolled by medicine team at admission    --TCP better     PLAN:       -- IV daptomycin likely at least 6 weeks, potentially step down to oral abx after that depending on clinical course     -- Check/review labs cultures and scans     -- Partial history per nursing staff        -- Highly complex set of issues with high risk for further serious morbidity and other serious sequela    --TYRESE noted; consider repeat TTE 2-4 weeks to evaluate for any evolving change;  Sooner if clinically worse    --anticipate rehab    -- case management looking into options    Bryce Euceda MD  10/2/2022        Electronically signed by Bryce Euceda MD at 10/02/22 1128       Consult Notes (last 48 hours)  Notes from 10/01/22 1624 through 10/03/22 1624   No notes of this type exist for this encounter.

## 2022-10-03 NOTE — PROGRESS NOTES
[facility] Progress Note        Antibiotics:  Anti-Infectives (From admission, onward)    Ordered     Dose/Rate Route Frequency Start Stop    09/25/22 0753  DAPTOmycin (CUBICIN) 650 mg in sodium chloride 0.9 % 50 mL IVPB        Ordering Provider: Bryce Euceda MD    8 mg/kg × 83 kg (Adjusted)  100 mL/hr over 30 Minutes Intravenous Every 24 Hours 09/25/22 0900 11/06/22 0859    09/21/22 1358  ceFAZolin in dextrose (ANCEF) IVPB solution 2 g        Ordering Provider: Brain Bentley MD    2 g  over 30 Minutes Intravenous Once 09/21/22 1445 09/21/22 1512    09/21/22 0204  vancomycin 2000 mg/500 mL 0.9% NS IVPB ([facility])        Ordering Provider: Ronen Payan DO    2,000 mg  over 120 Minutes Intravenous Once 09/21/22 0300 09/21/22 0554          CC: fatigue    HPI:      Patient is a 57 y.o.  Yr old male with history cirrhosis/diabetes and other comorbidity as outlined below.  History of left foot gas gangrene with osteomyelitis and MRSA bacteremia treated in [place] by Dr. Giordano in May/June 2022.  Family reports left transmetatarsal amputation in approximately 8 weeks IV vancomycin.  Notes from [place] indicate transthoracic echocardiogram no vegetation per Dr. Giordano; he thinks he might of had several weeks of oral antibiotic after that but not entirely clear.  He is admitted to [facility] September 20 with progressive right knee pain occurring over the past week preceding admission.  He thinks he might of twisted it but ended up with progressive redness/swelling and pain.  No specific blunt force or penetrating trauma.  No animal insect arthropod bite.  No open wounds or active drainage.  No prior history of VRE C. difficile or ESBL/K PC/CRE organisms; he was evaluated by orthopedics and taken to the operating room for right knee incision/drainage and concern for septic arthritis per Dr. Bentley; blood cultures have shown gram-positive cocci and initial PCR data suggesting MRSA.  Had dysuria but urinalysis  not consistent with UTI.  No hematuria or pyuria    9/23 with TV echodensity     9/26/22  TYRESE no vegetation per cardiology    10/3/22   no oxygen;  Per nursing,  no new focal pain or distress; postop Right knee pain is  dull at present,  Better controlled per nursing overall,   nonradiating, worse with movement, better with pain meds and 2  out of 10 in severity at present; he denies any other focal musculoskeletal pain.  No back pain. No myalgia     Left foot with no redness/swelling or pain.  Surgical site healed with no open wound or active drainage.     No headache photophobia or neck stiffness.  No nausea vomiting diarrhea or abdominal pain.  No shortness of breath cough or hemoptysis.  No other new skin rash.  No other recent procedures or interventions.  No indwelling prosthetic material otherwise.  No indwelling pacemaker chronic lines       ROS:      10/3/22 per nursing; No f/c/s. No n/v/d. No rash. No new ADR to Abx.       Constitutional-- No Fever, chills or sweats.  Appetite diminished with fatigue.  Heent-- No new vision, hearing or throat complaints.  No epistaxis or oral sores.  Denies odynophagia or dysphagia.  No flashers, floaters or eye pain.   No headache, photophobia or neck stiffness.  CV-- No chest pain, palpitation or syncope  Resp-- No SOB/cough/Hemoptysis  GI- No nausea, vomiting, or diarrhea.  No hematochezia, melena, or hematemesis. Denies jaundice  -- No dysuria, hematuria, or flank pain.  Denies hesitancy, urgency or flank pain.  Lymph- no swollen lymph nodes in neck/axilla or groin.   Heme- No active bruising or bleeding; no Hx of DVT or PE.  MS-- no swelling or pain in the bones or joints of arms/legs.  No new back pain.  Neuro-- No acute focal weakness or numbness in the arms or legs.  No seizures.     Full 12 point review of systems reviewed and negative otherwise for acute complaints, except for above       PE:   /63 (BP Location: Left arm, Patient Position: Lying)   Pulse  "86   Temp 98.2 °F (36.8 °C) (Oral)   Resp 15   Ht 177.8 cm (70\")   Wt 98 kg (216 lb)   SpO2 97%   BMI 30.99 kg/m²       GENERAL: sleepy, in no acute distress.  Chronically ill-appearing  HEENT: Normocephalic, atraumatic.   No conjunctival injection. No icterus. Oropharynx clear without evidence of thrush or exudate. No evidence of peridontal disease.    NECK: Supple without nuchal rigidity. No mass.   HEART: RRR; soft murmur LSB, rubs, gallops.   LUNGS: Diminished at bases but otherwise clear to auscultation bilaterally without wheezing, rales, rhonchi. Normal respiratory effort. Nonlabored. No dullness.  ABDOMEN: Soft, nontender, nondistended. Positive bowel sounds. No rebound or guarding. NO mass or HSM.  EXT:  No cyanosis, clubbing or edema. No cord.   MSK: FROM without joint effusions noted arms/legs.    SKIN: Warm and dry without cutaneous eruptions on Inspection/palpation.    NEURO: sleepy     Left foot with no redness, induration or warmth.  No discrete mass bulge or fluctuance.  No crepitus or bulla.  Prior surgical site healed at [facility].  No open wound or active drainage; no tenderness     Right knee postop surgical noted.  no new redness induration or warmth.  No discrete mass bulge or fluctuance.  No crepitus or bulla.     No peripheral stigmata/phenomena of endocarditis     IV without obvious redness or drainage    Laboratory Data    Results from last 7 days   Lab Units 09/29/22  1034 09/27/22  0412   WBC 10*3/mm3 6.09 6.67   HEMOGLOBIN g/dL 8.6* 8.2*   HEMATOCRIT % 26.9* 25.3*   PLATELETS 10*3/mm3 199 158     Results from last 7 days   Lab Units 10/02/22  1004   SODIUM mmol/L 131*   POTASSIUM mmol/L 4.5   CHLORIDE mmol/L 95*   CO2 mmol/L 26.0   BUN mg/dL 15   CREATININE mg/dL 0.70*   GLUCOSE mg/dL 258*   CALCIUM mg/dL 8.5*     Results from last 7 days   Lab Units 10/02/22  1004   ALK PHOS U/L 320*   BILIRUBIN mg/dL 0.8   ALT (SGPT) U/L 34   AST (SGOT) U/L 49*               Estimated Creatinine " Clearance: 136.7 mL/min (A) (by C-G formula based on SCr of 0.7 mg/dL (L)).      Microbiology:      Radiology:  Imaging Results (Last 72 Hours)     ** No results found for the last 72 hours. **            Impression:         --Acute MRSA bacteremia/septicemia, recurrent with prior history positive cultures May 2022 and recurrent despite prior 8 weeks of IV vancomycin and left foot surgery in addition to other supportive measures.   TTE with ? TV echodensity;   IV daptomycin 8 mg/kg initiated with pharmacy assisting with dosing.  High risk for further serious morbidity and other serious sequela including persistent/progressive or recurrent infection, metastatic foci of involvement and other dire consequences. TYRESE NO VEGETATION per cardiology; at risk for occult endovascular focus unable to confirm so far.  Nonetheless, given circumstances he will receive ongoing treatment empirically for potential occult endovascular focus     --Acute right knee pain/septic arthritis with surgical intervention, incision/debridement by Dr. Bentley on September 21.   MRSA+ in the setting of MRSA bacteremia.     --History of cirrhosis by records.  Unclear etiology.   further GI referral/eval per  medicine team     --Diabetes.  You need to tightly control blood sugar to give best chance for healing.;  Described as uncontrolled by medicine team at admission    --TCP better     PLAN:       -- IV daptomycin likely at least 6 weeks, potentially step down to oral abx after that depending on clinical course     -- Check/review labs cultures and scans     -- Partial history per nursing staff       -- Highly complex set of issues with high risk for further serious morbidity and other serious sequela    --TYRESE noted; consider repeat TTE 2-4 weeks to evaluate for any evolving change;  Sooner if clinically worse    --anticipate rehab    -- case management looking into options    Bryce Euceda MD  10/3/2022

## 2022-10-03 NOTE — CASE MANAGEMENT/SOCIAL WORK
Continued Stay Note  [facility]     Patient Name: Melchor Hardwick III  MRN: [id number]  Today's Date: 10/3/2022    Admit Date: 9/20/2022    [facility]   Discharge Plan     Row Name 10/03/22 1211       Plan    Plan [facility]    Patient/Family in Agreement with Plan yes    Plan Comments Mr. Hardwick's insurance precertification has not come through at this time. I have rescheduled his transportation for tomorrow at 1100 with [employer]. Case management will continue to follow.    Final Discharge Disposition Code 63 - [facility]               Discharge Codes    No documentation.               Expected Discharge Date and Time     Expected Discharge Date Expected Discharge Time    Oct 4, 2022 11:00 AM            Gavin Collazo RN

## 2022-10-03 NOTE — PROGRESS NOTES
[facility] Medicine Services  PROGRESS NOTE    Patient Name: Melchor Hardwick III  : 1965  MRN: [id number]    Date of Admission: 2022  Primary Care Physician: Missy Up APRN    Subjective     CC: f/u septic R knee     HPI:  Sitting in chair. Got dizzy and lightheaded again while getting into the chair. BP decreased from 97/57 sitting to 78/49 standing. Knee pain is controlled. Bowels moving well. Possibly to rehab tomorrow, insurance approval is still pending    ROS:  Gen- No fevers, chills  CV- No chest pain, palpitations  Resp- No cough, dyspnea  GI- No N/V/D, abd pain    Objective     Vital Signs:   Temp:  [98 °F (36.7 °C)-98.9 °F (37.2 °C)] 98.4 °F (36.9 °C)  Heart Rate:  [71-86] 78  Resp:  [15-16] 16  BP: (102-129)/(51-72) 102/60     Physical Exam:  Constitutional: No acute distress, awake, alert and conversant. Sitting in chair   HENT: NCAT, mucous membranes moist  Respiratory: Clear to auscultation bilaterally, respiratory effort normal   Cardiovascular: RRR, no murmurs, rubs, or gallops  Gastrointestinal: Positive bowel sounds, soft, nontender, nondistended  Musculoskeletal: No bilateral ankle edema. R knee incision dressed - I did not remove for exam. L midfoot amputation stump c/d/i without erythema, edema or induration   Psychiatric: Appropriate affect, cooperative  Neurologic: Oriented x 3, moves all extremities spontaneously without focal deficts, speech clear and coherent     Results Reviewed:  LAB RESULTS:      Lab 22  1034 22  0412   WBC 6.09 6.67   HEMOGLOBIN 8.6* 8.2*   HEMATOCRIT 26.9* 25.3*   PLATELETS 199 158   MCV 77.7* 76.4*         Lab 10/02/22  1004 22  1034 22  0412   SODIUM 131* 130* 135*   POTASSIUM 4.5 4.6 4.5   CHLORIDE 95* 96* 98   CO2 26.0 29.0 29.0   ANION GAP 10.0 5.0 8.0   BUN 15 11 12   CREATININE 0.70* 0.59* 0.62*   EGFR 107.5 113.2 111.5   GLUCOSE 258* 232* 129*   CALCIUM 8.5* 8.2* 8.0*         Lab  10/02/22  1004 09/29/22  1034 09/27/22  0412   TOTAL PROTEIN 6.4 5.6* 5.1*   ALBUMIN 2.50* 2.40* 2.20*   GLOBULIN 3.9 3.2 2.9   ALT (SGPT) 34 34 19   AST (SGOT) 49* 61* 34   BILIRUBIN 0.8 0.8 0.6   ALK PHOS 320* 304* 203*         Lab 10/02/22  1004   IRON 35*   IRON SATURATION 13*   TIBC 267*   TRANSFERRIN 179*   FERRITIN 308.80   FOLATE 16.40   VITAMIN B 12 1,032*     Brief Urine Lab Results  (Last result in the past 365 days)      Color   Clarity   Blood   Leuk Est   Nitrite   Protein   CREAT   Urine HCG        09/21/22 0141 Yellow   Cloudy   Negative   Negative   Negative   Trace               Microbiology Results Abnormal     Procedure Component Value - Date/Time    Blood Culture - Blood, Arm, Left [[id number]]  (Normal) Collected: 09/26/22 1126    Lab Status: Final result Specimen: Blood from Arm, Left Updated: 10/01/22 1232     Blood Culture No growth at 5 days    Blood Culture - Blood, Leg, Left [[id number]]  (Normal) Collected: 09/26/22 1126    Lab Status: Final result Specimen: Blood from Leg, Left Updated: 10/01/22 1232     Blood Culture No growth at 5 days    Anaerobic Culture 10 Day Incubation - Tissue, Knee, Right [[id number]] Collected: 09/21/22 1543    Lab Status: Final result Specimen: Tissue from Knee, Right Updated: 10/01/22 0648     Anaerobic Culture No anaerobes isolated at 10 days    Anaerobic Culture 10 Day Incubation - Tissue, Knee, Right [[id number]] Collected: 09/21/22 1543    Lab Status: Final result Specimen: Tissue from Knee, Right Updated: 10/01/22 0648     Anaerobic Culture No anaerobes isolated at 10 days    Anaerobic Culture 10 Day Incubation - Synovium, Knee, Right [[id number]] Collected: 09/21/22 1541    Lab Status: Final result Specimen: Synovium from Knee, Right Updated: 10/01/22 0648     Anaerobic Culture No anaerobes isolated at 10 days    Fungus Culture - Synovium, Knee, Right [[id number]] Collected: 09/21/22 1541    Lab Status: Preliminary result Specimen: Synovium from Knee, Right  Updated: 09/28/22 1748     Fungus Culture No fungus isolated at 1 week    Fungus Culture - Tissue, Knee, Right [[id number]] Collected: 09/21/22 1543    Lab Status: Preliminary result Specimen: Tissue from Knee, Right Updated: 09/28/22 1748     Fungus Culture No fungus isolated at 1 week    Fungus Culture - Tissue, Knee, Right [[id number]] Collected: 09/21/22 1543    Lab Status: Preliminary result Specimen: Tissue from Knee, Right Updated: 09/28/22 1748     Fungus Culture No fungus isolated at 1 week    AFB Culture - Synovium, Knee, Right [[id number]] Collected: 09/21/22 1541    Lab Status: Preliminary result Specimen: Synovium from Knee, Right Updated: 09/28/22 1748     AFB Culture No AFB isolated at 1 week     AFB Stain No acid fast bacilli seen on concentrated smear        No radiology results from the last 24 hrs    Results for orders placed during the hospital encounter of 09/20/22    Adult Transesophageal Echo (TYRESE) W/ Cont if Necessary Per Protocol    Interpretation Summary  · Normal left ventricular cavity size and wall thickness noted. All left ventricular wall segments contract normally. Estimated EF 60%.  · Normal RV structure and function  · Normal mitral valve with no significant stenosis or regurgitation  · Normal aortic valve with no significant stenosis or regurgitation, valve is trileaflet.  · Tricuspid valve has mild regurgitation, no evidence of vegetation on this exam  · Pulmonic valve is normal with no significant regurgitation seen in normal leaflet opening.  · No source of intracardiac infection identified. If there remains high clinical suspicion for infective endocarditis consider repeat study in 4 to 5 days.    I have reviewed the medications:  Scheduled Meds:acetaminophen, 1,000 mg, Oral, Q8H  aspirin, 81 mg, Oral, Q12H  DAPTOmycin, 8 mg/kg (Adjusted), Intravenous, Q24H  ferric gluconate, 250 mg, Intravenous, Daily  [START ON 10/5/2022] furosemide, 40 mg, Oral, Daily  insulin detemir, 24  Units, Subcutaneous, Q12H  insulin lispro, 0-9 Units, Subcutaneous, TID AC  Insulin Lispro, 18 Units, Subcutaneous, TID With Meals  multivitamin, 1 tablet, Oral, Daily  nadolol, 20 mg, Oral, Q24H  [START ON 10/5/2022] potassium chloride, 20 mEq, Oral, Daily  senna-docusate sodium, 2 tablet, Oral, BID  sodium chloride, 10 mL, Intravenous, Q12H  [START ON 10/5/2022] spironolactone, 25 mg, Oral, Daily  traZODone, 50 mg, Oral, Nightly      Continuous Infusions:ropivacaine,   sodium chloride, 125 mL/hr, Last Rate: 125 mL/hr (10/03/22 1115)      PRN Meds:.•  acetaminophen **OR** acetaminophen **OR** acetaminophen  •  senna-docusate sodium **AND** polyethylene glycol **AND** bisacodyl **AND** bisacodyl  •  dextrose  •  dextrose  •  diphenhydrAMINE **OR** diphenhydrAMINE  •  glucagon (human recombinant)  •  hydrOXYzine  •  magnesium sulfate **OR** magnesium sulfate **OR** magnesium sulfate  •  [] HYDROmorphone **AND** naloxone  •  naloxone  •  ondansetron **OR** ondansetron  •  oxyCODONE  •  potassium chloride **OR** potassium chloride **OR** potassium chloride  •  sodium chloride  •  sodium chloride    Assessment & Plan     Active Hospital Problems    Diagnosis  POA   • **Pyogenic arthritis of right knee joint, due to unspecified organism (HCC) [M00.9]  Yes   • MRSA bacteremia [R78.81, B95.62]  Yes   • 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     Brief Hospital Course to date:  Melchor Hardwick III is a 57 y.o. male with PMH significant for HTN, HLD, insulin-dependent DMII, PAZ cirrhosis, chronic anemia and obesity. Recently admitted to [facility] 5/10-22 for sepsis secondary to L foot gas gangrene/osteomyelitis with associated MRSA bacteremia. He  required L midfoot amputation. He was diagnosed with PAZ cirrhosis with ascites / portal hypertension during this admission. He transferred to [facility] acute rehab 6/2-6/16/22. Evaluated by PCP 9/15/22 due to R knee pain. Presented to [facility] ED 9/21/22 due to worsening R knee pain. Found to have R knee septic arthritis with associated MRSA bacteremia and tricuspid valve endocarditis.     He underwent I&D with Dr. Bentley on 9/22/22, gross purulence noted. Both surgical and blood cultures have grown MRSA. TTE revealed a possible tricuspid valve vegetation. CT Surgery consulted for TYRESE, which did not show vegetation. CTS has signed off. Infectious Disease is following.  Planning for 6 wks of IV daptomycin with TTE in 2-4 weeks.     R knee septic arthritis  MRSA bacteremia  Tricuspid valve endocarditis  - s/p knee aspiration in ED - > 50K WBCs on micro  - s/p R knee I&D by Dr. Bentley 9/22/22 - gross purulence encountered intraoperatively. Cultures (+) MRSA  - 9/20/22 blood cultures growing MRSA.   - Repeat blood cultures from 9/26/22 - NGTD   - 9/23/22 TTE concerning for 1 x 0.4cm mobile echodensity on tricuspid valve, concerning for vegetation  - 9/26/22 TTE did not show vegetation. CT surgery has evaluated and signed off  - Appreciate ID assistance. Will need 6-8 weeks of IV Daptomycin. Repeat TTE in 2-4 weeks    Orthostatic hypotension  - New issue this admission  - Hold Lasix tomorrow. Decrease from 40mg BID to daily  - Decrease Spironolactone to 25mg daily and hold tomorrow  - Give NS 125mL/hr x 20 hours  - AM BMP  - Orthostatics in AM      Hypokalemia  Hypomagenesemia  - Replace per protocol  - Continue scheduled potassium replacement      Chronic anemia   Thrombocytopenia, resolved  - Iron studies consistent with iron deficiency with anemia of chronic disease  - Continue IV iron daily x 5 days or until DC. Then transition to PO supplement  - Folate / B12 levels normal     Poorly-controlled  DMII  - Hgb A1c 8.8%  - DC Metformin - contraindicated with cirrhosis  - Continue Levemir 24 units BID, Lispro 18 units TID AC + SSI   - DM educator has seen    R buttock wound  - Present on arrival, now with skin tear  - WOC following       PAZ cirrhosis  - Diagnosed in June 2022 during recent admission at [facility]  - Now issues with orthostatic hypotension  - Decreased Lasix to 40mg daily (from BID)  - Decrease Spironolactine to 25mg daily  - Continue Nadolol 20mg daily  - Outpatient GI follow up as previously planned, will need GI referral at discharge     Recent osteomyelitis of left foot with MRSA bacteremia, s/p amputation left foot May 2022  - Was hospitalized at [facility] due to left foot osteomyelitis, initially had a washout and IV antibiotics but due to no improvement, ultimately had midfoot amputation performed by Podiatry on 5/18/22. He had MRSA bacteremia associated with this and completed 8 week course of IV Vancomycin.     Constipation, resolved  - Continue bowel regimen     Insomnia  Anxiety  - Continue Trazodone 50 mg QHS and PRN Hydroxyzine TID     Expected Discharge Location and Transportation: [facility] pending insurance approval   Expected Discharge Date: 10/4/22 @ 1100    DVT prophylaxis:Mechanical DVT prophylaxis orders are present.     AM-PAC 6 Clicks Score (PT): 15 (10/03/22 1032)    CODE STATUS:   Code Status and Medical Interventions:   Ordered at: 09/21/22 0497     Level Of Support Discussed With:    Patient     Code Status (Patient has no pulse and is not breathing):    CPR (Attempt to Resuscitate)     Medical Interventions (Patient has pulse or is breathing):    Full     Nova Galeas PA-C  10/03/22

## 2022-10-03 NOTE — PLAN OF CARE
Goal Outcome Evaluation:  Plan of Care Reviewed With: patient        Progress: improving  Outcome Evaluation: OT re cert completed this date. Pt continues to present w/ decreased I in ADLs, related t/fs, mobility compared to PLOF limited by decreased functional endurance, impaired balance, muscle weakness at BUEs, pain at posterior body (buttocks) and this date orthostatic hypotension with report of feeling light headed in standing and resulted in return to sitting w/o progressive mobility. Pt completed d/d gown with min A, face/hand hygiene and hair grooming with SUA, d/d LB garments with mod to dependent care with report of improved I using AE for R sock and shoe, u/a to assess this date d/t heightened s/s with forward, distal reach even with AE. Pt presents with downgrade in toileting d/t discomfort with BSC use and limited mobility to bathroom d/t BP issues.  Pt demonstrated need for min A For STS largely in transition in standing with carryover of UEs from chair to recliner. Pt req'd CGA in stand > sitting. Pt completed 3 sets x 5 reps of chair push ups however pt u/a to clear buttocks last set d/t fatigue and feeling LH. Pt set up to complete BUE strengthening with theraband. Plan to continue IPOT POC with graded goals as needed and recommend IRF at d/c when medically ready

## 2022-10-03 NOTE — PLAN OF CARE
Goal Outcome Evaluation:  Plan of Care Reviewed With: patient        Progress: no change  Outcome Evaluation: Pt comfortable with scheduled tylenol. BP low after ambulating, so pt sat in chair for awhile then back to bed. Will continue to monitor.

## 2022-10-03 NOTE — PLAN OF CARE
Goal Outcome Evaluation:  Plan of Care Reviewed With: patient        Progress: no change  Outcome Evaluation: Pt performed bed mobility with SBA. Pt performed STS with CGA and FWW. Pt only able to amb 20' with FWW, CGA, and chair follow secondary to dizziness reported. RN notified. Continue to recommend d/c to IRF when medically appropriate.

## 2022-10-03 NOTE — THERAPY TREATMENT NOTE
Patient Name: Melchor Hardwick III  : 1965    MRN: [id number]                              Today's Date: 10/3/2022       Admit Date: 2022    Visit Dx:     ICD-10-CM ICD-9-CM   1. Pyogenic arthritis of right knee joint, due to unspecified organism (HCC)  M00.9 711.06   2. Right knee pain, unspecified chronicity  M25.561 719.46   3. Generalized weakness  R53.1 780.79   4. Hyperglycemia  R73.9 790.29   5. Pyogenic arthritis of right knee joint (HCC)  M00.9 711.06     Patient Active Problem List   Diagnosis   • Hyperlipidemia   • Hypertensive disorder   • Obesity   • Type 2 diabetes mellitus (HCC)   • Gas gangrene of foot (HCC)   • Cellulitis in diabetic foot (HCC)   • Other acute osteomyelitis of left foot (HCC)   • Osteomyelitis (HCC)   • Critical illness myopathy   • Pyogenic arthritis of right knee joint, due to unspecified organism (HCC)   • Cirrhosis of liver (HCC)   • MRSA bacteremia   • Endocarditis of tricuspid valve     Past Medical History:   Diagnosis Date   • Diabetes mellitus (HCC)    • Hyperlipidemia    • Hypertension    • Pyogenic arthritis of right knee joint, due to unspecified organism (HCC) 2022     Past Surgical History:   Procedure Laterality Date   • ABSCESS DRAINAGE      PERINEUM   • AMPUTATION FOOT Left 2022    Procedure: AMPUTATION FOOT;  Surgeon: Addison Colby MD;  Location: [facility];  Service: Podiatry;  Laterality: Left;   • INCISION AND DRAINAGE LEG Left 05/10/2022    Procedure: INCISION AND DRAINAGE LOWER EXTREMITY;  Surgeon: Addison Colby MD;  Location: [facility] OR;  Service: Podiatry;  Laterality: Left;   • KNEE INCISION AND DRAINAGE Right 2022    Procedure: KNEE INCISION AND DRAINAGE RIGHT;  Surgeon: Brain Bentley MD;  Location: [facility] OR;  Service: Orthopedics;  Laterality: Right;   • WOUND DEBRIDEMENT Left 2022    Procedure: DEBRIDEMENT FOOT;  Surgeon: Addison Colby MD;  Location: [facility] OR;  Service: Podiatry;  Laterality: Left;       General Information     [facility] Name 10/03/22 1027          Physical Therapy Time and Intention    Document Type therapy note (daily note)  -     Mode of Treatment physical therapy  -     Row Name 10/03/22 1027          General Information    Patient Profile Reviewed yes  -     Existing Precautions/Restrictions fall;brace on at all times;other (see comments)  R Knee with KI on, NO ROM, L foot s/p amputation -use boot  -     Row Name 10/03/22 1027          Cognition    Orientation Status (Cognition) oriented x 4  -     Row Name 10/03/22 1027          Safety Issues, Functional Mobility    Impairments Affecting Function (Mobility) strength;balance;pain;endurance/activity tolerance;postural/trunk control;range of motion (ROM);sensation/sensory awareness  -           User Key  (r) = Recorded By, (t) = Taken By, (c) = Cosigned By    Initials Name Provider Type     Tonja Reyes, JANNETH Physical Therapist               Mobility     Row Name 10/03/22 1027          Bed Mobility    Supine-Sit [place] (Bed Mobility) standby assist  -     Comment, (Bed Mobility) Increased time and effort to complete task  -     Row Name 10/03/22 1027          Transfers    Comment, (Transfers) Pt performed STS with VC for hand placement. KI donned in bed and L boot at EOB.  -     Row Name 10/03/22 1027          Sit-Stand Transfer    Sit-Stand [place] (Transfers) contact guard  -     Assistive Device (Sit-Stand Transfers) walker, front-wheeled  -     Row Name 10/03/22 1027          Gait/Stairs (Locomotion)    [place] Level (Gait) verbal cues;contact guard  -     Assistive Device (Gait) walker, front-wheeled  -     Distance in Feet (Gait) 20  -     Deviations/Abnormal Patterns (Gait) right sided deviations;antalgic;stride length decreased;weight shifting decreased  -     Bilateral Gait Deviations forward flexed posture;heel strike decreased  -     Right Sided Gait Deviations weight shift ability decreased   -     Comment, (Gait/Stairs) Pt amb 20' with FWW and CGAx2. Pt reported dizziness with ambulation limiting his functional mobility. /60. RN notified.  -     Row Name 10/03/22 1027          Mobility    Extremity Weight-bearing Status left lower extremity;right lower extremity  -     Left Lower Extremity (Weight-bearing Status) weight-bearing as tolerated (WBAT);other (see comments)  in boot  -     Right Lower Extremity (Weight-bearing Status) weight-bearing as tolerated (WBAT)  -           User Key  (r) = Recorded By, (t) = Taken By, (c) = Cosigned By    Initials Name Provider Type     Tonja Reyes PT Physical Therapist               Obj/Interventions     Row Name 10/03/22 1030          Balance    Balance Assessment sitting static balance;sitting dynamic balance;sit to stand dynamic balance;standing static balance;standing dynamic balance  -     Static Sitting Balance standby assist  -     Dynamic Sitting Balance standby assist  -     Position, Sitting Balance sitting edge of bed  -     Static Standing Balance contact guard  -     Dynamic Standing Balance contact guard  -     Position/Device Used, Standing Balance supported;walker, rolling  -     Balance Interventions sitting;standing;sit to stand;occupation based/functional task  -           User Key  (r) = Recorded By, (t) = Taken By, (c) = Cosigned By    Initials Name Provider Type     Tonja Reyes PT Physical Therapist               Goals/Plan    No documentation.                Clinical Impression     Row Name 10/03/22 1030          Pain    Pretreatment Pain Rating 0/10 - no pain  -     Posttreatment Pain Rating 0/10 - no pain  -     Row Name 10/03/22 1030          Plan of Care Review    Plan of Care Reviewed With patient  -     Progress no change  -     Outcome Evaluation Pt performed bed mobility with SBA. Pt performed STS with CGA and FWW. Pt only able to amb 20' with FWW, CGA, and chair follow secondary to  dizziness reported. RN notified. Continue to recommend d/c to IRF when medically appropriate.  -HP     Row Name 10/03/22 1030          Vital Signs    Pre Systolic BP Rehab 129  -HP     Pre Treatment Diastolic BP 63  -HP     Intra Systolic BP Rehab 115  -HP     Intra Treatment Diastolic BP 60  -HP     Pre Patient Position Supine  -HP     Intra Patient Position Standing  -HP     Post Patient Position Sitting  -HP     Row Name 10/03/22 1030          Positioning and Restraints    Pre-Treatment Position in bed  -HP     Post Treatment Position chair  -HP     In Chair notified nsg;reclined;call light within reach;sitting;encouraged to call for assist;exit alarm on;legs elevated;with brace  -HP           User Key  (r) = Recorded By, (t) = Taken By, (c) = Cosigned By    Initials Name Provider Type    Tonja Eisenberg PT Physical Therapist               Outcome Measures     Row Name 10/03/22 1032          How much help from another person do you currently need...    Turning from your back to your side while in flat bed without using bedrails? 3  -HP     Moving from lying on back to sitting on the side of a flat bed without bedrails? 3  -HP     Moving to and from a bed to a chair (including a wheelchair)? 3  -HP     Standing up from a chair using your arms (e.g., wheelchair, bedside chair)? 3  -HP     Climbing 3-5 steps with a railing? 1  -HP     To walk in hospital room? 2  -HP     AM-PAC 6 Clicks Score (PT) 15  -HP     Highest level of mobility 4 --> Transferred to chair/commode  -HP     Row Name 10/03/22 1032          Functional Assessment    Outcome Measure Options AM-PAC 6 Clicks Basic Mobility (PT)  -HP           User Key  (r) = Recorded By, (t) = Taken By, (c) = Cosigned By    Initials Name Provider Type    Tonja Eisenberg PT Physical Therapist                             Physical Therapy Education                 Title: PT OT SLP Therapies (Done)     Topic: Physical Therapy (Done)     Point: Mobility training  (Done)     Learning Progress Summary           Patient Acceptance, E,D, VU,NR by HP at 10/3/2022 1032    Acceptance, E, VU by FW at 10/2/2022 1535    Acceptance, E, VU by FW at 10/1/2022 1429    Eager, E, VU by SC at 9/29/2022 1156    Comment: reviewed benefits of activity    Eager, E, VU by SC at 9/27/2022 1316    Comment: reviewed benefits of activity    Acceptance, E,D, VU,NR by LR at 9/26/2022 1128    Comment: Educated on precautions, weight bearing status, safety with mobility, correct sit<->stand t/f technique, correct gait mechanics, HEP, and progression of POC.    Acceptance, E,D, VU,NR by LR at 9/25/2022 1110    Comment: Educated on precautions, weight bearing status, safety with mobility, correct supine to sit t/f technique, correct sit<->stand t/f technique, correct gait mechanics, HEP, and progression of POC.    Acceptance, E,D, VU,NR by LR at 9/24/2022 1523    Comment: Educated on precautions, weight bearing status, HEP, safety with mobility, correct supine to sit t/f technique, correct sit<->stand t/f technique, correct gait mechanics, and progression of POC.    Acceptance, E, VU by LG at 9/24/2022 0545    Acceptance, E, VU by LG at 9/23/2022 0531    Acceptance, E,D, VU,NR by MB at 9/22/2022 1350                   Point: Home exercise program (Done)     Learning Progress Summary           Patient Acceptance, E,D, VU,NR by HP at 10/3/2022 1032    Acceptance, E, VU by FW at 10/2/2022 1535    Eager, E, VU by SC at 9/29/2022 1156    Comment: reviewed benefits of activity    Eager, E, VU by SC at 9/27/2022 1316    Comment: reviewed benefits of activity    Acceptance, E,D, VU,NR by LR at 9/26/2022 1128    Comment: Educated on precautions, weight bearing status, safety with mobility, correct sit<->stand t/f technique, correct gait mechanics, HEP, and progression of POC.    Acceptance, E,D, VU,NR by LR at 9/25/2022 1110    Comment: Educated on precautions, weight bearing status, safety with mobility, correct  supine to sit t/f technique, correct sit<->stand t/f technique, correct gait mechanics, HEP, and progression of POC.    Acceptance, E,D, VU,NR by LR at 9/24/2022 1523    Comment: Educated on precautions, weight bearing status, HEP, safety with mobility, correct supine to sit t/f technique, correct sit<->stand t/f technique, correct gait mechanics, and progression of POC.    Acceptance, E, VU by LG at 9/24/2022 0545    Acceptance, E, VU by LG at 9/23/2022 0531    Acceptance, E,D, VU,NR by MB at 9/22/2022 1350                   Point: Body mechanics (Done)     Learning Progress Summary           Patient Acceptance, E,D, VU,NR by HP at 10/3/2022 1032    Acceptance, E, VU by FW at 10/2/2022 1535    Acceptance, E, VU by FW at 10/1/2022 1429    Eager, E, VU by SC at 9/29/2022 1156    Comment: reviewed benefits of activity    Eager, E, VU by SC at 9/27/2022 1316    Comment: reviewed benefits of activity    Acceptance, E,D, VU,NR by LR at 9/26/2022 1128    Comment: Educated on precautions, weight bearing status, safety with mobility, correct sit<->stand t/f technique, correct gait mechanics, HEP, and progression of POC.    Acceptance, E,D, VU,NR by LR at 9/25/2022 1110    Comment: Educated on precautions, weight bearing status, safety with mobility, correct supine to sit t/f technique, correct sit<->stand t/f technique, correct gait mechanics, HEP, and progression of POC.    Acceptance, E,D, VU,NR by LR at 9/24/2022 1523    Comment: Educated on precautions, weight bearing status, HEP, safety with mobility, correct supine to sit t/f technique, correct sit<->stand t/f technique, correct gait mechanics, and progression of POC.    Acceptance, E, VU by LG at 9/24/2022 0545    Acceptance, E, VU by LG at 9/23/2022 0531    Acceptance, E,D, VU,NR by MB at 9/22/2022 1350                   Point: Precautions (Done)     Learning Progress Summary           Patient Acceptance, E,D, VU,NR by HP at 10/3/2022 1032    Acceptance, E VU by FW  at 10/2/2022 1535    Acceptance, E, VU by  at 10/1/2022 1429    Eager, E, VU by SC at 9/29/2022 1156    Comment: reviewed benefits of activity    Eager, E, VU by SC at 9/27/2022 1316    Comment: reviewed benefits of activity    Acceptance, E,D, VU,NR by LR at 9/26/2022 1128    Comment: Educated on precautions, weight bearing status, safety with mobility, correct sit<->stand t/f technique, correct gait mechanics, HEP, and progression of POC.    Acceptance, E,D, VU,NR by LR at 9/25/2022 1110    Comment: Educated on precautions, weight bearing status, safety with mobility, correct supine to sit t/f technique, correct sit<->stand t/f technique, correct gait mechanics, HEP, and progression of POC.    Acceptance, E,D, VU,NR by LR at 9/24/2022 1523    Comment: Educated on precautions, weight bearing status, HEP, safety with mobility, correct supine to sit t/f technique, correct sit<->stand t/f technique, correct gait mechanics, and progression of POC.    Acceptance, E, VU by  at 9/24/2022 0545    Acceptance, E, VU by  at 9/23/2022 0531    Acceptance, E,D, VU,NR by MB at 9/22/2022 1350                               User Key     Initials Effective Dates Name Provider Type Discipline    SC 06/16/21 -  Orion Reyes, PT Physical Therapist PT    LR 06/16/21 -  Modesta Engel, PT Physical Therapist PT    MB 06/16/21 -  Leatha Tobin PT Physical Therapist PT     11/16/18 -  Jak De La Rosa RN Registered Nurse Nurse    FW 05/05/22 -  Artur Quinones, PT Physical Therapist PT     06/01/21 -  Tonja Reyes PT Physical Therapist PT              PT Recommendation and Plan     Plan of Care Reviewed With: patient  Progress: no change  Outcome Evaluation: Pt performed bed mobility with SBA. Pt performed STS with CGA and FWW. Pt only able to amb 20' with FWW, CGA, and chair follow secondary to dizziness reported. RN notified. Continue to recommend d/c to IRF when medically appropriate.     Time  Calculation:    PT Charges     Row Name 10/03/22 1000             Time Calculation    Start Time 1000  -HP      PT Received On 10/03/22  -HP              Timed Charges    03744 - Gait Training Minutes  8  -HP      47882 - PT Therapeutic Activity Minutes 15  -HP              Total Minutes    Timed Charges Total Minutes 23  -HP       Total Minutes 23  -HP            User Key  (r) = Recorded By, (t) = Taken By, (c) = Cosigned By    Initials Name Provider Type     Tonja Reyes, PT Physical Therapist              Therapy Charges for Today     Code Description Service Date Service Provider Modifiers Qty    93933166689 HC GAIT TRAINING EA 15 MIN 10/3/2022 Tonja Reyes, PT GP 1    17033225958 HC PT THERAPEUTIC ACT EA 15 MIN 10/3/2022 Tonja Reyes, PT GP 1          PT G-Codes  Outcome Measure Options: AM-PAC 6 Clicks Basic Mobility (PT)  AM-PAC 6 Clicks Score (PT): 15  AM-PAC 6 Clicks Score (OT): 15    Tonja Reyes PT  10/3/2022

## 2022-10-03 NOTE — PLAN OF CARE
Goal Outcome Evaluation:  Plan of Care Reviewed With: patient        Progress: no change       AOx4, VS WNL, RA, optifoam dressing CDI, slept well between care, minimal complaints of pain this shift

## 2022-10-03 NOTE — THERAPY PROGRESS REPORT/RE-CERT
Patient Name: Melchor Hardwick III  : 1965    MRN: [id number]                              Today's Date: 10/3/2022       Admit Date: 2022    Visit Dx:     ICD-10-CM ICD-9-CM   1. Pyogenic arthritis of right knee joint, due to unspecified organism (HCC)  M00.9 711.06   2. Right knee pain, unspecified chronicity  M25.561 719.46   3. Generalized weakness  R53.1 780.79   4. Hyperglycemia  R73.9 790.29   5. Pyogenic arthritis of right knee joint (HCC)  M00.9 711.06     Patient Active Problem List   Diagnosis   • Hyperlipidemia   • Hypertensive disorder   • Obesity   • Type 2 diabetes mellitus (HCC)   • Gas gangrene of foot (HCC)   • Cellulitis in diabetic foot (HCC)   • Other acute osteomyelitis of left foot (HCC)   • Osteomyelitis (HCC)   • Critical illness myopathy   • Pyogenic arthritis of right knee joint, due to unspecified organism (HCC)   • Cirrhosis of liver (HCC)   • MRSA bacteremia   • Endocarditis of tricuspid valve     Past Medical History:   Diagnosis Date   • Diabetes mellitus (HCC)    • Hyperlipidemia    • Hypertension    • Pyogenic arthritis of right knee joint, due to unspecified organism (HCC) 2022     Past Surgical History:   Procedure Laterality Date   • ABSCESS DRAINAGE      PERINEUM   • AMPUTATION FOOT Left 2022    Procedure: AMPUTATION FOOT;  Surgeon: Addison Colby MD;  Location: [facility];  Service: Podiatry;  Laterality: Left;   • INCISION AND DRAINAGE LEG Left 05/10/2022    Procedure: INCISION AND DRAINAGE LOWER EXTREMITY;  Surgeon: Addison Colby MD;  Location: [facility] OR;  Service: Podiatry;  Laterality: Left;   • KNEE INCISION AND DRAINAGE Right 2022    Procedure: KNEE INCISION AND DRAINAGE RIGHT;  Surgeon: Brain Bentley MD;  [facility];  Service: Orthopedics;  Laterality: Right;   • WOUND DEBRIDEMENT Left 2022    Procedure: DEBRIDEMENT FOOT;  Surgeon: Addison Colby MD;  Location: [facility] OR;  Service: Podiatry;  Laterality: Left;       General Information     Row Name 10/03/22 1055          OT Time and Intention    Document Type progress note/recertification  -JY     Mode of Treatment occupational therapy;individual therapy  -JY     Row Name 10/03/22 1055          General Information    Patient Profile Reviewed yes  -JY     Existing Precautions/Restrictions fall;brace on at all times;other (see comments);orthostatic hypotension  R knee w/ KI donned, no ROM, L foot s/p amputation - use boot, R gluteal skin tear  -J     Barriers to Rehab medically complex;previous functional deficit  -JY     Row Name 10/03/22 1055          Cognition    Orientation Status (Cognition) oriented x 4  -JY     Row Name 10/03/22 1055          Safety Issues, Functional Mobility    Impairments Affecting Function (Mobility) strength;balance;pain;endurance/activity tolerance;postural/trunk control;range of motion (ROM);sensation/sensory awareness  -J     Comment, Safety Issues/Impairments (Mobility) pt alert and following commands; progressive mobility limited d/t orthostatic hypotension w/ increased s/s of feeling LH in standing  -J           User Key  (r) = Recorded By, (t) = Taken By, (c) = Cosigned By    Initials Name Provider Type    Jaja Morales, OT Occupational Therapist                 Mobility/ADL's     Row Name 10/03/22 1059          Bed Mobility    Bed Mobility other (see comments)  received UIC  -JSISSY     Rolling Left [place] (Bed Mobility) not tested  -ALTHEA     Rolling Right [place] (Bed Mobility) not tested  -ALTHEA     Scooting/Bridging [place] (Bed Mobility) not tested  -JSISSY     Supine-Sit [place] (Bed Mobility) not tested  -ALTHEA     Sidelying-Sit [place] (Bed Mobility) not tested  -JSISSY     Comment, (Bed Mobility) pt received UIC and preferred to remain OOB with facilitated pressure relief, reduction strategies given R gluteal skin tear thus bed mobility not assessed  -J     Row Name 10/03/22 1059          Transfers    Transfers sit-stand  transfer;stand-sit transfer  -JY     Comment, (Transfers) STS at chair with cues for hand placement to aid in more fluid transition between semi standing and erect standing and to move RLE forward prior to sitting given I abelardo  -JY     Sit-Stand [place] (Transfers) minimum assist (75% patient effort);verbal cues  -JY     Stand-Sit [place] (Transfers) contact guard;verbal cues  -"[employer]"Y     Row Name 10/03/22 1058          Sit-Stand Transfer    Assistive Device (Sit-Stand Transfers) walker, front-wheeled  -mascotsecret     Row Name 10/03/22 1058          Stand-Sit Transfer    Assistive Device (Stand-Sit Transfers) walker, front-wheeled  -mascotsecret     Row Name 10/03/22 1058          Functional Mobility    Functional Mobility- Comment defer to PT for specifics; pt presented with decreased BP in standing from 97/57 sitting to 78/49 standing with increased s/s of feeling LH, unable to safely progress mobility and pt assisted back to sitting and reclined sitting  -mascotsecret     Row Name 10/03/22 1058          Activities of Daily Living    BADL Assessment/Intervention upper body dressing;lower body dressing;grooming;toileting  -mascotsecret     Row Name 10/03/22 1058          Mobility    Extremity Weight-bearing Status left lower extremity;right lower extremity  -JY     Left Lower Extremity (Weight-bearing Status) weight-bearing as tolerated (WBAT);other (see comments)  in boot  -JY     Right Lower Extremity (Weight-bearing Status) weight-bearing as tolerated (WBAT);other (see comments)  AUGUSTO zhou  -mascotsecret     Row Name 10/03/22 1058          Lower Body Dressing Assessment/Training    Comment, (Lower Body Dressing) dependent for donning L gabe and CATIE CASAS, pt verbalizes increased I since using AE for R sock and shoe however deferred completion this date d/t decreased BP and more symptomatic with forward, distal reach  -mascotsecret     Row Name 10/03/22 1058          Upper Body Dressing Assessment/Training    [place] Level (Upper Body Dressing)  doff;don;pajama/robe;minimum assist (75% patient effort)  -JY     Position (Upper Body Dressing) supported sitting  -JY     Comment, (Upper Body Dressing) min A for proximal and posterior mgmt of gown, tying and untying, situational A given IV at LUE  -     Row Name 10/03/22 1058          Grooming Assessment/Training    [place] Level (Grooming) wash face, hands;hair care, combing/brushing;set up  -JY     Position (Grooming) supported sitting  -     Row Name 10/03/22 1058          Toileting Assessment/Training    Comment, (Toileting) OT did not assess authentic toileting however pt reports inability to tolerate use of BSC d/t discomfort at buttocks w/ R gluteal skin tear and recent orthostatic hypotension has limited safety to mobilize to bathroom thus pt has used downgraded bed pan  -           User Key  (r) = Recorded By, (t) = Taken By, (c) = Cosigned By    Initials Name Provider Type    Jaja Morales OT Occupational Therapist               Obj/Interventions     Row Name 10/03/22 1107          Shoulder (Therapeutic Exercise)    Shoulder (Therapeutic Exercise) strengthening exercise;other (see comments)  chair push ups  -     Shoulder Strengthening (Therapeutic Exercise) other (see comments);3 sets;5 repetitions  chair push ups  -     Row Name 10/03/22 1107          Motor Skills    Therapeutic Exercise other (see comments)  OT facilitated chair push ups for BUE TE and pressure relief at posterior body given skin integrity concerns; pt fatigued during 3rd set and didn't clear buttocks; tricep strengthening with chair push ups; pt further set up to complete theraband TE  -     Row Name 10/03/22 1107          Balance    Balance Assessment sitting static balance;sitting dynamic balance;standing static balance  -JY     Static Sitting Balance standby assist  -JY     Dynamic Sitting Balance standby assist  -JY     Position, Sitting Balance unsupported;supported;sitting in chair  -JY     Static  Standing Balance contact guard  -JY     Position/Device Used, Standing Balance supported;walker, front-wheeled  -JY     Balance Interventions sitting;static;dynamic;sit to stand;supported;occupation based/functional task  -JY     Comment, Balance no overt LOB during seated or standing tasks, pt reported feeliing light headed in standing with noted decrease in BP; used FWW in standing  -JY           User Key  (r) = Recorded By, (t) = Taken By, (c) = Cosigned By    Initials Name Provider Type    Jaja Morales, PAYAM Occupational Therapist               Goals/Plan     Row Name 10/03/22 1127          Transfer Goal 1 (OT)    Activity/Assistive Device (Transfer Goal 1, OT) toilet;sit-to-stand/stand-to-sit;commode, bedside without drop arms;bed-to-chair/chair-to-bed  -JY     [place] Level/Cues Needed (Transfer Goal 1, OT) contact guard required;verbal cues required  -JY     Time Frame (Transfer Goal 1, OT) long term goal (LTG)  -JY     Strategies/Barriers (Transfers Goal 1, OT) WBAT RLE with KI AAT, WBAT LLE with walking boot  -JY     Progress/Outcome (Transfer Goal 1, OT) goal met;goal revised this date  -JY     Row Name 10/03/22 1127          Dressing Goal 1 (OT)    Activity/Device (Dressing Goal 1, OT) lower body dressing;reacher;sock-aid;long-handled shoe horn  -JY     [place]/Cues Needed (Dressing Goal 1, OT) moderate assist (50-74% patient effort);verbal cues required;tactile cues required  -JY     Time Frame (Dressing Goal 1, OT) long term goal (LTG);by discharge  -JY     Strategies/Barriers (Dressing Goal 1, OT) WBAT RLE with KI AAT, WBAT LLE with walking boot  -JY     Progress/Outcome (Dressing Goal 1, OT) goal revised this date;goal ongoing  -JY     Row Name 10/03/22 1127          Toileting Goal 1 (OT)    Activity/Device (Toileting Goal 1, OT) perform perineal hygiene  -JY     [place] Level/Cues Needed (Toileting Goal 1, OT) set-up required;standby assist  -JY     Time Frame (Toileting Goal 1, OT)  long term goal (LTG);by discharge  -JY     Progress/Outcome (Toileting Goal 1, OT) goal ongoing  -JY     Row Name 10/03/22 1127          Strength Goal 1 (OT)    Strength Goal 1 (OT) Pt demonstates independence with BUE HEP to support transfer training and psoterior pressure releif: 3x3-5 reps chair push-ups w/ clearance at buttocks  -JY     Time Frame (Strength Goal 1, OT) long term goal (LTG);by discharge  -JY     Strategies/Barriers (Strength Goal 1, OT) WBAT RLE with KI AAT, WBAT LLE with walking boot  -JY     Progress/Outcome (Strength Goal 1, OT) good progress toward goal;goal ongoing  -JY     Row Name 10/03/22 1127          Therapy Assessment/Plan (OT)    Planned Therapy Interventions (OT) activity tolerance training;adaptive equipment training;BADL retraining;functional balance retraining;occupation/activity based interventions;patient/caregiver education/training;ROM/therapeutic exercise;strengthening exercise;transfer/mobility retraining  -JY           User Key  (r) = Recorded By, (t) = Taken By, (c) = Cosigned By    Initials Name Provider Type    Jaja Morales, PAYAM Occupational Therapist               Clinical Impression     Row Name 10/03/22 1113          Pain Assessment    Pretreatment Pain Rating 5/10  -JY     Posttreatment Pain Rating 5/10  -JY     Pain Location - Side/Orientation Right  -JY     Pain Location generalized  -JY     Pain Location - buttock  -JY     Pre/Posttreatment Pain Comment pt presents with R gluteal skin tear and reports increased discomfort, pain with increased sitting; educated pt on pressure relief and reduction strategies, facilitating chair push up for UE Strengthening and relief at buttocks  -JY     Pain Intervention(s) Repositioned;Ambulation/increased activity  -JY     Row Name 10/03/22 1113          Plan of Care Review    Plan of Care Reviewed With patient  -JY     Progress improving  -JY     Outcome Evaluation OT re cert completed this date. Pt continues to present w/  decreased I in ADLs, related t/fs, mobility compared to PLOF limited by decreased functional endurance, impaired balance, muscle weakness at BUEs, pain at posterior body (buttocks) and this date orthostatic hypotension with report of feeling light headed in standing and resulted in return to sitting w/o progressive mobility. Pt completed d/d gown with min A, face/hand hygiene and hair grooming with SUA, d/d LB garments with mod to dependent care with report of improved I using AE for R sock and shoe, u/a to assess this date d/t heightened s/s with forward, distal reach even with AE. Pt presents with downgrade in toileting d/t discomfort with BSC use and limited mobility to bathroom d/t BP issues.  Pt demonstrated need for min A For STS largely in transition in standing with carryover of UEs from chair to recliner. Pt req'd CGA in stand > sitting. Pt completed 3 sets x 5 reps of chair push ups however pt u/a to clear buttocks last set d/t fatigue and feeling LH. Pt set up to complete BUE strengthening with theraband. Plan to continue IPOT POC with graded goals as needed and recommend IRF at d/c when medically ready  -J     Row Name 10/03/22 1113          Therapy Assessment/Plan (OT)    Patient/Family Therapy Goal Statement (OT) to maximize I in ADLs, related t/fs, return to PLOF  -JY     Rehab Potential (OT) good, to achieve stated therapy goals  -JY     Criteria for Skilled Therapeutic Interventions Met (OT) yes;meets criteria;skilled treatment is necessary  -JY     Therapy Frequency (OT) daily  -JY     Row Name 10/03/22 1113          Therapy Plan Review/Discharge Plan (OT)    Anticipated Discharge Disposition (OT) inpatient rehabilitation facility  -JY     Row Name 10/03/22 1113          Vital Signs    Pre Systolic BP Rehab 97   sitting  -JY     Pre Treatment Diastolic BP 57  -JY     Intra Systolic BP Rehab 78   supine  -JY     Intra Treatment Diastolic BP 49  -JY     Post Systolic BP Rehab 102   sitting after  standing  -JY     Post Treatment Diastolic BP 60  -JY     Pretreatment Heart Rate (beats/min) 76  -JY     Posttreatment Heart Rate (beats/min) 78  -JY     Pre SpO2 (%) 97  -JY     O2 Delivery Pre Treatment room air  -JY     O2 Delivery Intra Treatment room air  -JY     Post SpO2 (%) 96  -JY     O2 Delivery Post Treatment room air  -JY     Pre Patient Position Sitting  -JY     Intra Patient Position Standing  -JY     Post Patient Position Sitting  -JY     Row Name 10/03/22 1113          Positioning and Restraints    Pre-Treatment Position sitting in chair/recliner  -JY     Post Treatment Position chair  -JY     In Chair notified nsg;reclined;call light within reach;encouraged to call for assist;exit alarm on;waffle cushion;legs elevated  KI donned RLE and boot on L foot per pt preference  -JY           User Key  (r) = Recorded By, (t) = Taken By, (c) = Cosigned By    Initials Name Provider Type    Jaja Morales, OT Occupational Therapist               Outcome Measures     Row Name 10/03/22 1130          How much help from another is currently needed...    Putting on and taking off regular lower body clothing? 2  -JY     Bathing (including washing, rinsing, and drying) 2  -JY     Toileting (which includes using toilet bed pan or urinal) 2  -JY     Putting on and taking off regular upper body clothing 3  -JY     Taking care of personal grooming (such as brushing teeth) 3  -JY     Eating meals 3  -JY     AM-PAC 6 Clicks Score (OT) 15  -JY     Row Name 10/03/22 1032          How much help from another person do you currently need...    Turning from your back to your side while in flat bed without using bedrails? 3  -HP     Moving from lying on back to sitting on the side of a flat bed without bedrails? 3  -HP     Moving to and from a bed to a chair (including a wheelchair)? 3  -HP     Standing up from a chair using your arms (e.g., wheelchair, bedside chair)? 3  -HP     Climbing 3-5 steps with a railing? 1  -HP      To walk in hospital room? 2  -HP     AM-PAC 6 Clicks Score (PT) 15  -HP     Highest level of mobility 4 --> Transferred to chair/commode  -     Row Name 10/03/22 1130 10/03/22 1032       Functional Assessment    Outcome Measure Options AM-PAC 6 Clicks Daily Activity (OT)  -CHERRYY AM-PAC 6 Clicks Basic Mobility (PT)  -HP          User Key  (r) = Recorded By, (t) = Taken By, (c) = Cosigned By    Initials Name Provider Type    Jaja Morales, OT Occupational Therapist    HP Tonja Reyes, PT Physical Therapist                Occupational Therapy Education                 Title: PT OT SLP Therapies (In Progress)     Topic: Occupational Therapy (In Progress)     Point: ADL training (In Progress)     Description:   Instruct learner(s) on proper safety adaptation and remediation techniques during self care or transfers.   Instruct in proper use of assistive devices.              Learning Progress Summary           Patient Acceptance, E,D, NR by ALTHEA at 10/3/2022 1027    Eager, E,TB,D, VU,NR by AR at 9/28/2022 0952    Eager, E,TB,D,H, VU,NR by AR at 9/26/2022 1033    Acceptance, E, VU by LG at 9/24/2022 0545    Acceptance, E,D, VU,NR by TB at 9/23/2022 1053   Family Eager, E,TB,D,H, VU,NR by AR at 9/26/2022 1033    Acceptance, E,D, VU,NR by TB at 9/23/2022 1053                   Point: Home exercise program (In Progress)     Description:   Instruct learner(s) on appropriate technique for monitoring, assisting and/or progressing therapeutic exercises/activities.              Learning Progress Summary           Patient Acceptance, E,D, NR by ALTHEA at 10/3/2022 1027    Eager, E,TB,D, VU,NR by AR at 9/28/2022 0952    Eager, E,TB,D,H, VU,NR by AR at 9/26/2022 1033    Acceptance, E, VU by LG at 9/24/2022 0545   Family Eager, E,TB,D,H, VU,NR by AR at 9/26/2022 1033                   Point: Precautions (In Progress)     Description:   Instruct learner(s) on prescribed precautions during self-care and functional transfers.               Learning Progress Summary           Patient Acceptance, E,D, NR by JY at 10/3/2022 1027    Eager, E,TB,D, VU,NR by AR at 9/28/2022 0952    Eager, E,TB,D,H, VU,NR by AR at 9/26/2022 1033    Acceptance, E, VU by LG at 9/24/2022 0545    Acceptance, E,D, VU,NR by TB at 9/23/2022 1053   Family Eager, E,TB,D,H, VU,NR by AR at 9/26/2022 1033    Acceptance, E,D, VU,NR by TB at 9/23/2022 1053                   Point: Body mechanics (In Progress)     Description:   Instruct learner(s) on proper positioning and spine alignment during self-care, functional mobility activities and/or exercises.              Learning Progress Summary           Patient Acceptance, E,D, NR by ALTHEA at 10/3/2022 1027    Eager, E,TB,D, VU,NR by AR at 9/28/2022 0952    Eager, E,TB,D,H, VU,NR by AR at 9/26/2022 1033    Acceptance, E, VU by LG at 9/24/2022 0545   Family Eager, E,TB,D,H, VU,NR by AR at 9/26/2022 1033                               User Key     Initials Effective Dates Name Provider Type Discipline     06/16/21 -  Kacie Hughes, OT Occupational Therapist OT    AR 06/16/21 -  Tiffanie Posey OT Occupational Therapist OT    LG 11/16/18 -  Jak De La Rosa RN Registered Nurse Nurse    CHERRY 06/16/21 -  Jaja Suresh OT Occupational Therapist OT              OT Recommendation and Plan  Planned Therapy Interventions (OT): activity tolerance training, adaptive equipment training, BADL retraining, functional balance retraining, occupation/activity based interventions, patient/caregiver education/training, ROM/therapeutic exercise, strengthening exercise, transfer/mobility retraining  Therapy Frequency (OT): daily  Plan of Care Review  Plan of Care Reviewed With: patient  Progress: improving  Outcome Evaluation: OT re cert completed this date. Pt continues to present w/ decreased I in ADLs, related t/fs, mobility compared to PLOF limited by decreased functional endurance, impaired balance, muscle weakness at BUEs, pain at  posterior body (buttocks) and this date orthostatic hypotension with report of feeling light headed in standing and resulted in return to sitting w/o progressive mobility. Pt completed d/d gown with min A, face/hand hygiene and hair grooming with SUA, d/d LB garments with mod to dependent care with report of improved I using AE for R sock and shoe, u/a to assess this date d/t heightened s/s with forward, distal reach even with AE. Pt presents with downgrade in toileting d/t discomfort with BSC use and limited mobility to bathroom d/t BP issues.  Pt demonstrated need for min A For STS largely in transition in standing with carryover of UEs from chair to recliner. Pt req'd CGA in stand > sitting. Pt completed 3 sets x 5 reps of chair push ups however pt u/a to clear buttocks last set d/t fatigue and feeling LH. Pt set up to complete BUE strengthening with theraband. Plan to continue IPOT POC with graded goals as needed and recommend IRF at d/c when medically ready     Time Calculation:    Time Calculation- OT     Row Name 10/03/22 1132 10/03/22 1000          Time Calculation- OT    OT Start Time 1027  -JY --     OT Received On 10/03/22  -JY --     OT Goal Re-Cert Due Date 10/13/22  -JY --            Timed Charges    25688 - OT Therapeutic Exercise Minutes 10  -JY --     98992 - Gait Training Minutes  -- 8  -HP     19732 - OT Therapeutic Activity Minutes 9  -JY --     91130 - OT Self Care/Mgmt Minutes 10  -JY --            Total Minutes    Timed Charges Total Minutes 29  -JY 8  -HP      Total Minutes 29  -JY 8  -HP           User Key  (r) = Recorded By, (t) = Taken By, (c) = Cosigned By    Initials Name Provider Type    Jaja Morales OT Occupational Therapist    HP Tonja Reyes, PT Physical Therapist              Therapy Charges for Today     Code Description Service Date Service Provider Modifiers Qty    12005585422  OT THER PROC EA 15 MIN 10/3/2022 Jaja Suresh OT GO 1    25844199156 HC OT SELF  CARE/MGMT/TRAIN EA 15 MIN 10/3/2022 Jaja Suresh, OT GO 1    87143817261 HC OT THER SUPP EA 15 MIN 10/3/2022 Jaja Suresh OT GO 2               Jaja Suresh OT  10/3/2022

## 2022-10-04 LAB
ANION GAP SERPL CALCULATED.3IONS-SCNC: 10 MMOL/L (ref 5–15)
BUN SERPL-MCNC: 13 MG/DL (ref 6–20)
BUN/CREAT SERPL: 23.2 (ref 7–25)
CALCIUM SPEC-SCNC: 8.1 MG/DL (ref 8.6–10.5)
CHLORIDE SERPL-SCNC: 102 MMOL/L (ref 98–107)
CO2 SERPL-SCNC: 20 MMOL/L (ref 22–29)
CREAT SERPL-MCNC: 0.56 MG/DL (ref 0.76–1.27)
EGFRCR SERPLBLD CKD-EPI 2021: 115 ML/MIN/1.73
GLUCOSE BLDC GLUCOMTR-MCNC: 184 MG/DL (ref 70–130)
GLUCOSE BLDC GLUCOMTR-MCNC: 187 MG/DL (ref 70–130)
GLUCOSE BLDC GLUCOMTR-MCNC: 203 MG/DL (ref 70–130)
GLUCOSE BLDC GLUCOMTR-MCNC: 238 MG/DL (ref 70–130)
GLUCOSE SERPL-MCNC: 187 MG/DL (ref 65–99)
POTASSIUM SERPL-SCNC: 4.5 MMOL/L (ref 3.5–5.2)
SARS-COV-2 RDRP RESP QL NAA+PROBE: NORMAL
SODIUM SERPL-SCNC: 132 MMOL/L (ref 136–145)

## 2022-10-04 PROCEDURE — 82962 GLUCOSE BLOOD TEST: CPT

## 2022-10-04 PROCEDURE — 97530 THERAPEUTIC ACTIVITIES: CPT

## 2022-10-04 PROCEDURE — 80048 BASIC METABOLIC PNL TOTAL CA: CPT | Performed by: PHYSICIAN ASSISTANT

## 2022-10-04 PROCEDURE — 25010000002 DAPTOMYCIN PER 1 MG: Performed by: INTERNAL MEDICINE

## 2022-10-04 PROCEDURE — 87635 SARS-COV-2 COVID-19 AMP PRB: CPT | Performed by: PHYSICIAN ASSISTANT

## 2022-10-04 PROCEDURE — 63710000001 INSULIN LISPRO (HUMAN) PER 5 UNITS: Performed by: NURSE PRACTITIONER

## 2022-10-04 PROCEDURE — 25010000002 NA FERRIC GLUC CPLX PER 12.5 MG: Performed by: PHYSICIAN ASSISTANT

## 2022-10-04 PROCEDURE — 63710000001 INSULIN LISPRO (HUMAN) PER 5 UNITS: Performed by: ORTHOPAEDIC SURGERY

## 2022-10-04 PROCEDURE — 63710000001 INSULIN LISPRO (HUMAN) PER 5 UNITS: Performed by: PHYSICIAN ASSISTANT

## 2022-10-04 PROCEDURE — 99232 SBSQ HOSP IP/OBS MODERATE 35: CPT | Performed by: PHYSICIAN ASSISTANT

## 2022-10-04 PROCEDURE — 63710000001 INSULIN DETEMIR PER 5 UNITS: Performed by: PHYSICIAN ASSISTANT

## 2022-10-04 RX ORDER — NADOLOL 20 MG/1
10 TABLET ORAL
Status: DISCONTINUED | OUTPATIENT
Start: 2022-10-05 | End: 2022-10-04

## 2022-10-04 RX ORDER — SODIUM CHLORIDE 9 MG/ML
75 INJECTION, SOLUTION INTRAVENOUS CONTINUOUS
Status: DISCONTINUED | OUTPATIENT
Start: 2022-10-04 | End: 2022-10-05 | Stop reason: HOSPADM

## 2022-10-04 RX ORDER — INSULIN LISPRO 100 [IU]/ML
20 INJECTION, SOLUTION INTRAVENOUS; SUBCUTANEOUS
Status: DISCONTINUED | OUTPATIENT
Start: 2022-10-04 | End: 2022-10-05 | Stop reason: HOSPADM

## 2022-10-04 RX ADMIN — INSULIN LISPRO 4 UNITS: 100 INJECTION, SOLUTION INTRAVENOUS; SUBCUTANEOUS at 12:15

## 2022-10-04 RX ADMIN — ASPIRIN 81 MG: 81 TABLET, COATED ORAL at 20:32

## 2022-10-04 RX ADMIN — ACETAMINOPHEN 1000 MG: 500 TABLET, FILM COATED ORAL at 12:15

## 2022-10-04 RX ADMIN — INSULIN DETEMIR 25 UNITS: 100 INJECTION, SOLUTION SUBCUTANEOUS at 20:31

## 2022-10-04 RX ADMIN — INSULIN LISPRO 20 UNITS: 100 INJECTION, SOLUTION INTRAVENOUS; SUBCUTANEOUS at 17:14

## 2022-10-04 RX ADMIN — SODIUM CHLORIDE 250 MG: 9 INJECTION, SOLUTION INTRAVENOUS at 08:04

## 2022-10-04 RX ADMIN — SODIUM CHLORIDE 125 ML/HR: 9 INJECTION, SOLUTION INTRAVENOUS at 02:25

## 2022-10-04 RX ADMIN — Medication 10 ML: at 20:35

## 2022-10-04 RX ADMIN — INSULIN DETEMIR 24 UNITS: 100 INJECTION, SOLUTION SUBCUTANEOUS at 08:27

## 2022-10-04 RX ADMIN — SODIUM CHLORIDE 75 ML/HR: 9 INJECTION, SOLUTION INTRAVENOUS at 11:45

## 2022-10-04 RX ADMIN — ACETAMINOPHEN 1000 MG: 500 TABLET, FILM COATED ORAL at 18:12

## 2022-10-04 RX ADMIN — ACETAMINOPHEN 1000 MG: 500 TABLET, FILM COATED ORAL at 02:25

## 2022-10-04 RX ADMIN — INSULIN LISPRO 2 UNITS: 100 INJECTION, SOLUTION INTRAVENOUS; SUBCUTANEOUS at 17:14

## 2022-10-04 RX ADMIN — INSULIN LISPRO 2 UNITS: 100 INJECTION, SOLUTION INTRAVENOUS; SUBCUTANEOUS at 08:27

## 2022-10-04 RX ADMIN — OXYCODONE HYDROCHLORIDE 7.5 MG: 15 TABLET ORAL at 00:33

## 2022-10-04 RX ADMIN — SENNOSIDES AND DOCUSATE SODIUM 2 TABLET: 50; 8.6 TABLET ORAL at 08:04

## 2022-10-04 RX ADMIN — INSULIN LISPRO 18 UNITS: 100 INJECTION, SOLUTION INTRAVENOUS; SUBCUTANEOUS at 08:27

## 2022-10-04 RX ADMIN — DAPTOMYCIN 650 MG: 500 INJECTION, POWDER, LYOPHILIZED, FOR SOLUTION INTRAVENOUS at 08:04

## 2022-10-04 RX ADMIN — ASPIRIN 81 MG: 81 TABLET, COATED ORAL at 08:04

## 2022-10-04 RX ADMIN — TRAZODONE HYDROCHLORIDE 50 MG: 50 TABLET ORAL at 20:35

## 2022-10-04 RX ADMIN — MULTIVITAMIN TABLET 1 TABLET: TABLET at 08:04

## 2022-10-04 RX ADMIN — INSULIN LISPRO 18 UNITS: 100 INJECTION, SOLUTION INTRAVENOUS; SUBCUTANEOUS at 12:15

## 2022-10-04 NOTE — CASE MANAGEMENT/SOCIAL WORK
Continued Stay Note  [facility]     Patient Name: Melchor Hardwick III  MRN: [id number]  Today's Date: 10/4/2022    Admit Date: 9/20/2022    Plan: [facility]   Discharge Plan     Row Name 10/04/22 1359       Plan    Plan [facility]    Patient/Family in Agreement with Plan yes    Plan Comments Mr. Hardwick has been approved, by insurance, to transfer to [facility]. However, his orthostasis has continued. The APRN would like for Mr. Hardwick to remain inpatient for another night. I have rescheduled his transportation until Wednesday 10/5/2022 at 1100 with [employer]. Case management will continue to follow.    Final Discharge Disposition Code 63 - [facility]               Discharge Codes    No documentation.               Expected Discharge Date and Time     Expected Discharge Date Expected Discharge Time    Oct 5, 2022             Gavin Collazo RN

## 2022-10-04 NOTE — THERAPY TREATMENT NOTE
Patient Name: Melchor Hardwick III  : 1965    MRN: [id number]                              Today's Date: 10/4/2022       Admit Date: 2022    Visit Dx:     ICD-10-CM ICD-9-CM   1. Pyogenic arthritis of right knee joint, due to unspecified organism (HCC)  M00.9 711.06   2. Right knee pain, unspecified chronicity  M25.561 719.46   3. Generalized weakness  R53.1 780.79   4. Hyperglycemia  R73.9 790.29   5. Pyogenic arthritis of right knee joint (HCC)  M00.9 711.06     Patient Active Problem List   Diagnosis   • Hyperlipidemia   • Hypertensive disorder   • Obesity   • Type 2 diabetes mellitus (HCC)   • Gas gangrene of foot (HCC)   • Cellulitis in diabetic foot (HCC)   • Other acute osteomyelitis of left foot (HCC)   • Osteomyelitis (HCC)   • Critical illness myopathy   • Pyogenic arthritis of right knee joint, due to unspecified organism (HCC)   • Cirrhosis of liver (HCC)   • MRSA bacteremia   • Endocarditis of tricuspid valve     Past Medical History:   Diagnosis Date   • Diabetes mellitus (HCC)    • Hyperlipidemia    • Hypertension    • Pyogenic arthritis of right knee joint, due to unspecified organism (HCC) 2022     Past Surgical History:   Procedure Laterality Date   • ABSCESS DRAINAGE      PERINEUM   • AMPUTATION FOOT Left 2022    Procedure: AMPUTATION FOOT;  Surgeon: Addison Colby MD;  Location: [facility];  Service: Podiatry;  Laterality: Left;   • INCISION AND DRAINAGE LEG Left 05/10/2022    Procedure: INCISION AND DRAINAGE LOWER EXTREMITY;  Surgeon: Addison Colby MD;  Location: [facility] OR;  Service: Podiatry;  Laterality: Left;   • KNEE INCISION AND DRAINAGE Right 2022    Procedure: KNEE INCISION AND DRAINAGE RIGHT;  Surgeon: Brain Bentley MD;  Location: [facility] OR;  Service: Orthopedics;  Laterality: Right;   • WOUND DEBRIDEMENT Left 2022    Procedure: DEBRIDEMENT FOOT;  Surgeon: Addison Colby MD;  Location: [facility] OR;  Service: Podiatry;  Laterality: Left;       General Information     Row Name 10/04/22 1326          OT Time and Intention    Document Type therapy note (daily note)  -TB     Mode of Treatment occupational therapy;individual therapy  -TB     Row Name 10/04/22 1326          General Information    Patient Profile Reviewed yes  -TB     Existing Precautions/Restrictions fall;brace on at all times;orthostatic hypotension  RLE KI on at all times, L foot amputation with boot on when up  -TB     Row Name 10/04/22 1326          Cognition    Orientation Status (Cognition) oriented x 4  -TB     Row Name 10/04/22 1326          Safety Issues, Functional Mobility    Impairments Affecting Function (Mobility) balance;endurance/activity tolerance;pain;strength;postural/trunk control;range of motion (ROM);sensation/sensory awareness  -TB           User Key  (r) = Recorded By, (t) = Taken By, (c) = Cosigned By    Initials Name Provider Type    TB Kacie Hughes OT Occupational Therapist                 Mobility/ADL's     Row Name 10/04/22 1327          Bed Mobility    Bed Mobility rolling left;rolling right  -TB     Rolling Left [place] (Bed Mobility) modified independence  -TB     Rolling Right [place] (Bed Mobility) modified independence  -TB     Assistive Device (Bed Mobility) bed rails  -TB     Comment, (Bed Mobility) Repositioned for comfort and activity  -TB     Row Name 10/04/22 1327          Transfers    Comment, (Transfers) Pt just returned to bed from chair. Declined EOB/OOB due to buttock pain. Repositioned to offset pressure.  -TB     Row Name 10/04/22 1327          Activities of Daily Living    BADL Assessment/Intervention grooming;feeding  -TB     Row Name 10/04/22 1327          Mobility    Extremity Weight-bearing Status right lower extremity;left lower extremity  -TB     Left Lower Extremity (Weight-bearing Status) weight-bearing as tolerated (WBAT)  s/p L foot transmet amp, don boot for all OOB activity  -TB     Right Lower Extremity  (Weight-bearing Status) weight-bearing as tolerated (WBAT);other (see comments)  RLE KI AAT  -TB     Row Name 10/04/22 1327          Self-Feeding Assessment/Training    [place] Level (Feeding) independent;liquids to mouth  -TB     Position (Self-Feeding) sitting up in bed;supported sitting  -TB     Row Name 10/04/22 1327          Grooming Assessment/Training    [place] Level (Grooming) set up;wash face, hands  -TB     Position (Grooming) sitting up in bed;supported sitting  -TB           User Key  (r) = Recorded By, (t) = Taken By, (c) = Cosigned By    Initials Name Provider Type    TB Kacie Hughes OT Occupational Therapist               Obj/Interventions     Row Name 10/04/22 1330          Shoulder (Therapeutic Exercise)    Shoulder Strengthening (Therapeutic Exercise) bilateral;horizontal aBduction/aDduction;10 repetitions;3 sets  -TB     Row Name 10/04/22 1330          Elbow/Forearm (Therapeutic Exercise)    Elbow/Forearm (Therapeutic Exercise) other (see comments)  -TB     Elbow/Forearm Strengthening (Therapeutic Exercise) bilateral;flexion;extension;10 repetitions;3 sets  -TB     Row Name 10/04/22 1330          Motor Skills    Therapeutic Exercise shoulder;elbow/forearm  Education reinforced for benefits of dynamic activity/therapy, role of OT, and HEP to support self-care and transfer training. Pt completed BLE ther ex: ankle pumps, quad sets, glute sets 3x10 reps & BUE as documented below  -TB           User Key  (r) = Recorded By, (t) = Taken By, (c) = Cosigned By    Initials Name Provider Type    TB Kacie Hughes OT Occupational Therapist               Goals/Plan    No documentation.                Clinical Impression     Row Name 10/04/22 1331          Pain Assessment    Pretreatment Pain Rating 6/10  -TB     Posttreatment Pain Rating 6/10  -TB     Pre/Posttreatment Pain Comment Buttocks after sitting up in chair, repositioned to offset pressures in supine  -TB     Pain  Intervention(s) Ambulation/increased activity;Repositioned  -TB     Row Name 10/04/22 1332          Plan of Care Review    Plan of Care Reviewed With patient  -TB     Progress improving  -TB     Outcome Evaluation Pt is A/Ox4 and motivated to work with therapy. OOB activity continues to be limited by acute hypotension. Education reinforced for HEP. Pt completes 3x10 reps BLE AROM ther ex and BUE theraband HEP to support self-care performance and transfer training. Set-up simple grooming/self-feeding. OT will continue to follow IP. Plan is rehab when medically ready.  -TB     Row Name 10/04/22 1332          Therapy Plan Review/Discharge Plan (OT)    Anticipated Discharge Disposition (OT) inpatient rehabilitation facility  -TB     Row Name 10/04/22 1332          Vital Signs    Pre Systolic BP Rehab --  RN cleared OT, hypotensive  -TB     O2 Delivery Pre Treatment room air  -TB     O2 Delivery Post Treatment room air  -TB     Pre Patient Position Supine  -TB     Intra Patient Position Sitting  -TB     Post Patient Position Side Lying  -TB     Row Name 10/04/22 1332          Positioning and Restraints    Pre-Treatment Position in bed  -TB     Post Treatment Position bed  -TB     In Bed notified nsg;side lying right;call light within reach;encouraged to call for assist;exit alarm on;SCD pump applied;heels elevated;pillow between legs  -TB           User Key  (r) = Recorded By, (t) = Taken By, (c) = Cosigned By    Initials Name Provider Type    TB Kacie Hughes, PAYAM Occupational Therapist               Outcome Measures     Row Name 10/04/22 7316          How much help from another is currently needed...    Putting on and taking off regular lower body clothing? 2  -TB     Bathing (including washing, rinsing, and drying) 2  -TB     Toileting (which includes using toilet bed pan or urinal) 2  -TB     Putting on and taking off regular upper body clothing 3  -TB     Taking care of personal grooming (such as brushing  teeth) 3  -TB     Eating meals 4  -TB     AM-PAC 6 Clicks Score (OT) 16  -TB     Row Name 10/04/22 1154 10/04/22 0810       How much help from another person do you currently need...    Turning from your back to your side while in flat bed without using bedrails? 3  -SC 3  -SM (r) KR (t) SM (c)    Moving from lying on back to sitting on the side of a flat bed without bedrails? 3  -SC 3  -SM (r) KR (t) SM (c)    Moving to and from a bed to a chair (including a wheelchair)? 3  -SC 3  -SM (r) KR (t) SM (c)    Standing up from a chair using your arms (e.g., wheelchair, bedside chair)? 3  -SC 3  -SM (r) KR (t) SM (c)    Climbing 3-5 steps with a railing? 1  -SC 1  -SM (r) KR (t) SM (c)    To walk in hospital room? 2  -SC 2  -SM (r) KR (t) SM (c)    AM-PAC 6 Clicks Score (PT) 15  -SC 15  -SM (r) KR (t)    Highest level of mobility 4 --> Transferred to chair/commode  -SC 4 --> Transferred to chair/commode  -SM (r) KR (t)    Row Name 10/04/22 0800          How much help from another person do you currently need...    Turning from your back to your side while in flat bed without using bedrails? 3  -TS     Moving from lying on back to sitting on the side of a flat bed without bedrails? 3  -TS     Moving to and from a bed to a chair (including a wheelchair)? 3  -TS     Standing up from a chair using your arms (e.g., wheelchair, bedside chair)? 3  -TS     Climbing 3-5 steps with a railing? 1  -TS     To walk in hospital room? 2  -TS     AM-PAC 6 Clicks Score (PT) 15  -TS     Highest level of mobility 4 --> Transferred to chair/commode  -TS     Row Name 10/04/22 1339 10/04/22 1154       Functional Assessment    Outcome Measure Options AM-PAC 6 Clicks Daily Activity (OT)  -TB AM-PAC 6 Clicks Basic Mobility (PT)  -SC          User Key  (r) = Recorded By, (t) = Taken By, (c) = Cosigned By    Initials Name Provider Type    SC Orion Reyes PT Physical Therapist    Kacie Hernandez, OT Occupational Therapist    SM  Kaitlyn Hodge, RN, Nursing Instructor Registered Nurse    Valerie Friedman RN Registered Nurse    Ashley Luque, Nursing Student Nursing Student                Occupational Therapy Education                 Title: PT OT SLP Therapies (In Progress)     Topic: Occupational Therapy (In Progress)     Point: ADL training (Done)     Description:   Instruct learner(s) on proper safety adaptation and remediation techniques during self care or transfers.   Instruct in proper use of assistive devices.              Learning Progress Summary           Patient Acceptance, E,D,TB, VU,DU,NR by TB at 10/4/2022 1339    Acceptance, E,D, NR by JY at 10/3/2022 1027    Eager, E,TB,D, VU,NR by AR at 9/28/2022 0952    Eager, E,TB,D,H, VU,NR by AR at 9/26/2022 1033    Acceptance, E, VU by LG at 9/24/2022 0545    Acceptance, E,D, VU,NR by TB at 9/23/2022 1053   Family Eager, E,TB,D,H, VU,NR by AR at 9/26/2022 1033    Acceptance, E,D, VU,NR by TB at 9/23/2022 1053                   Point: Home exercise program (Done)     Description:   Instruct learner(s) on appropriate technique for monitoring, assisting and/or progressing therapeutic exercises/activities.              Learning Progress Summary           Patient Acceptance, E,D,TB, VU,DU,NR by TB at 10/4/2022 1339    Acceptance, E,D, NR by JY at 10/3/2022 1027    Eager, E,TB,D, VU,NR by AR at 9/28/2022 0952    Eager, E,TB,D,H, VU,NR by AR at 9/26/2022 1033    Acceptance, E, VU by LG at 9/24/2022 0545   Family Eager, E,TB,D,H, VU,NR by AR at 9/26/2022 1033                   Point: Precautions (Done)     Description:   Instruct learner(s) on prescribed precautions during self-care and functional transfers.              Learning Progress Summary           Patient Acceptance, E,D,TB, VU,DU,NR by TB at 10/4/2022 1339    Acceptance, E,D, NR by JY at 10/3/2022 1027    YANELY Junior,CECILIO DENTON, FLORECITA,NR by AR at 9/28/2022 0952    YANELY Junior,CECILIO DENTON,H, FLORECITA,NR by AR at 9/26/2022 1033    YANELY Cheatham,  VU by LG at 9/24/2022 0545    Acceptance, E,D, VU,NR by TB at 9/23/2022 1053   Family Eager, E,TB,D,H, VU,NR by AR at 9/26/2022 1033    Acceptance, E,D, VU,NR by TB at 9/23/2022 1053                   Point: Body mechanics (In Progress)     Description:   Instruct learner(s) on proper positioning and spine alignment during self-care, functional mobility activities and/or exercises.              Learning Progress Summary           Patient Acceptance, E,D, NR by JY at 10/3/2022 1027    Eager, E,TB,D, VU,NR by AR at 9/28/2022 0952    Eager, E,TB,D,H, VU,NR by AR at 9/26/2022 1033    Acceptance, E, VU by LG at 9/24/2022 0545   Family Eager, E,TB,D,H, VU,NR by AR at 9/26/2022 1033                               User Key     Initials Effective Dates Name Provider Type Discipline    TB 06/16/21 -  Kacie Hughes, OT Occupational Therapist OT    AR 06/16/21 -  Tiffanie Posey, OT Occupational Therapist OT    LG 11/16/18 -  Jak De La Rosa, RN Registered Nurse Nurse    CHERRY 06/16/21 -  Jaja Suresh OT Occupational Therapist OT              OT Recommendation and Plan  Therapy Frequency (OT): daily  Plan of Care Review  Plan of Care Reviewed With: patient  Progress: improving  Outcome Evaluation: Pt is A/Ox4 and motivated to work with therapy. OOB activity continues to be limited by acute hypotension. Education reinforced for HEP. Pt completes 3x10 reps BLE AROM ther ex and BUE theraband HEP to support self-care performance and transfer training. Set-up simple grooming/self-feeding. OT will continue to follow IP. Plan is rehab when medically ready.     Time Calculation:    Time Calculation- OT     Row Name 10/04/22 1301 10/04/22 1117          Time Calculation- OT    OT Start Time 1301  -TB --     OT Received On 10/04/22  -TB --     OT Goal Re-Cert Due Date 10/13/22  -TB --            Timed Charges    12552 - Gait Training Minutes  -- 8  -SC     00082 - OT Therapeutic Activity Minutes 24  -TB --             Total Minutes    Timed Charges Total Minutes 24  -TB 8  -SC      Total Minutes 24  -TB 8  -SC           User Key  (r) = Recorded By, (t) = Taken By, (c) = Cosigned By    Initials Name Provider Type    SC Orion Reyes PT Physical Therapist    TB Kacie Hughes OT Occupational Therapist              Therapy Charges for Today     Code Description Service Date Service Provider Modifiers Qty    [id number] HC OT THERAPEUTIC ACT EA 15 MIN 10/4/2022 Kacie Hughes OT GO 2               Kacie Hughes OT  10/4/2022

## 2022-10-04 NOTE — THERAPY TREATMENT NOTE
Patient Name: Melchor Hardwick III  : 1965    MRN: [id number]                              Today's Date: 10/4/2022       Admit Date: 2022    Visit Dx:     ICD-10-CM ICD-9-CM   1. Pyogenic arthritis of right knee joint, due to unspecified organism (HCC)  M00.9 711.06   2. Right knee pain, unspecified chronicity  M25.561 719.46   3. Generalized weakness  R53.1 780.79   4. Hyperglycemia  R73.9 790.29   5. Pyogenic arthritis of right knee joint (HCC)  M00.9 711.06     Patient Active Problem List   Diagnosis   • Hyperlipidemia   • Hypertensive disorder   • Obesity   • Type 2 diabetes mellitus (HCC)   • Gas gangrene of foot (HCC)   • Cellulitis in diabetic foot (HCC)   • Other acute osteomyelitis of left foot (HCC)   • Osteomyelitis (HCC)   • Critical illness myopathy   • Pyogenic arthritis of right knee joint, due to unspecified organism (HCC)   • Cirrhosis of liver (HCC)   • MRSA bacteremia   • Endocarditis of tricuspid valve     Past Medical History:   Diagnosis Date   • Diabetes mellitus (HCC)    • Hyperlipidemia    • Hypertension    • Pyogenic arthritis of right knee joint, due to unspecified organism (HCC) 2022     Past Surgical History:   Procedure Laterality Date   • ABSCESS DRAINAGE      PERINEUM   • AMPUTATION FOOT Left 2022    Procedure: AMPUTATION FOOT;  Surgeon: Addison Colby MD;  Location: [facility];  Service: Podiatry;  Laterality: Left;   • INCISION AND DRAINAGE LEG Left 05/10/2022    Procedure: INCISION AND DRAINAGE LOWER EXTREMITY;  Surgeon: Addison Colby MD;  Location: [facility] OR;  Service: Podiatry;  Laterality: Left;   • KNEE INCISION AND DRAINAGE Right 2022    Procedure: KNEE INCISION AND DRAINAGE RIGHT;  Surgeon: Brain Bentley MD;  Location: [facility] OR;  Service: Orthopedics;  Laterality: Right;   • WOUND DEBRIDEMENT Left 2022    Procedure: DEBRIDEMENT FOOT;  Surgeon: Addison Colby MD;  Location: [facility] OR;  Service: Podiatry;  Laterality: Left;       General Information     [facility] Name 10/04/22 1140          Physical Therapy Time and Intention    Document Type therapy note (daily note)  -SC     Mode of Treatment physical therapy  -[facility] Name 10/04/22 1140          General Information    Patient Profile Reviewed yes  -SC     Existing Precautions/Restrictions fall;brace on at all times;other (see comments);orthostatic hypotension  KI on at all times, L foot amputation with boot, orthostatic hypotension  -[facility] Name 10/04/22 1140          Cognition    Orientation Status (Cognition) oriented x 4  -[facility] Name 10/04/22 1140          Safety Issues, Functional Mobility    Impairments Affecting Function (Mobility) strength;balance;pain;endurance/activity tolerance;postural/trunk control;range of motion (ROM);sensation/sensory awareness  -SC     Comment, Safety Issues/Impairments (Mobility) alert, following commands c/o weakness and dizziness in standing  -SC           User Key  (r) = Recorded By, (t) = Taken By, (c) = Cosigned By    Initials Name Provider Type    SC Orion Reyes PT Physical Therapist               Mobility     [facility] Name 10/04/22 1145          Bed Mobility    Scooting/Bridging [place] (Bed Mobility) modified independence  -SC     Supine-Sit [place] (Bed Mobility) modified independence;verbal cues;standby assist  -SC     Assistive Device (Bed Mobility) bed rails;head of bed elevated  -SC     Comment, (Bed Mobility) extra time needed to get up to edge of bed. Able to use bed rails to help himself  -[facility] Name 10/04/22 1145          Bed-Chair Transfer    Bed-Chair [place] (Transfers) 1 person assist;1 person to manage equipment;contact guard;verbal cues  -SC     Assistive Device (Bed-Chair Transfers) walker, front-wheeled  -SC     Comment, (Bed-Chair Transfer) cues for side stepping over to chair  -[facility] Name 10/04/22 1145          Sit-Stand Transfer    Sit-Stand [place] (Transfers) 1 person assist;contact  guard;verbal cues  -SC     Assistive Device (Sit-Stand Transfers) walker, front-wheeled  -SC     Comment, (Sit-Stand Transfer) required elevated bed. c/o dizziness in standing.  -SC     Row Name 10/04/22 1145          Gait/Stairs (Locomotion)    Comment, (Gait/Stairs) Transfers only. Gait deferred due to BP droping in standing with c/o dizziness  -[facility] Name 10/04/22 1145          Mobility    Extremity Weight-bearing Status left lower extremity;right lower extremity  -SC     Left Lower Extremity (Weight-bearing Status) weight-bearing as tolerated (WBAT);other (see comments)  in boot  -SC     Right Lower Extremity (Weight-bearing Status) weight-bearing as tolerated (WBAT);other (see comments)  KI on  -SC           User Key  (r) = Recorded By, (t) = Taken By, (c) = Cosigned By    Initials Name Provider Type    SC Orion Reyes, PT Physical Therapist               Obj/Interventions     [facility] Name 10/04/22 1151          Motor Skills    Therapeutic Exercise knee  -[facility] Name 10/04/22 1151          Hip (Therapeutic Exercise)    Hip (Therapeutic Exercise) strengthening exercise  -[facility] Name 10/04/22 1151          Knee (Therapeutic Exercise)    Knee Strengthening (Therapeutic Exercise) right;SLR (straight leg raise);10 repetitions  -[facility] Name 10/04/22 1151          Balance    Balance Assessment standing dynamic balance  -SC     Dynamic Standing Balance 1-person assist;verbal cues;1 person to manage equipment  -SC     Position/Device Used, Standing Balance supported;walker, rolling  -SC           User Key  (r) = Recorded By, (t) = Taken By, (c) = Cosigned By    Initials Name Provider Type    SC Orion Reyes, PT Physical Therapist               Goals/Plan    No documentation.                Clinical Impression     Row Name 10/04/22 1152          Pain    Pretreatment Pain Rating 5/10  -SC     Posttreatment Pain Rating 5/10  -SC     Pain Location - Side/Orientation Right  -SC     Pain Location - knee   -SC     Row Name 10/04/22 1152          Plan of Care Review    Plan of Care Reviewed With patient  -SC     Outcome Evaluation Patient mobility limited by droping BP when upright  -SC     Row Name 10/04/22 1152          Therapy Assessment/Plan (PT)    Rehab Potential (PT) good, to achieve stated therapy goals  -SC     Criteria for Skilled Interventions Met (PT) yes;meets criteria;skilled treatment is necessary  -SC     Therapy Frequency (PT) daily  -SC     Row Name 10/04/22 1152          Vital Signs    Post Systolic BP Rehab 132  -SC     Post Treatment Diastolic BP 70  sitting  -[facility] Name 10/04/22 1152          Positioning and Restraints    Pre-Treatment Position in bed  -SC     Post Treatment Position chair  -SC     In Chair notified nsg;reclined;sitting;call light within reach;encouraged to call for assist;exit alarm on;with family/caregiver  -SC           User Key  (r) = Recorded By, (t) = Taken By, (c) = Cosigned By    Initials Name Provider Type    SC Orion Reyes, PT Physical Therapist               Outcome Measures     Row Name 10/04/22 1154 10/04/22 0810       How much help from another person do you currently need...    Turning from your back to your side while in flat bed without using bedrails? 3  -SC 3 (P)   -KR    Moving from lying on back to sitting on the side of a flat bed without bedrails? 3  -SC 3 (P)   -KR    Moving to and from a bed to a chair (including a wheelchair)? 3  -SC 3 (P)   -KR    Standing up from a chair using your arms (e.g., wheelchair, bedside chair)? 3  -SC 3 (P)   -KR    Climbing 3-5 steps with a railing? 1  -SC 1 (P)   -KR    To walk in hospital room? 2  -SC 2 (P)   -KR    AM-PAC 6 Clicks Score (PT) 15  -SC 15 (P)   -KR    Highest level of mobility 4 --> Transferred to chair/commode  -SC 4 --> Transferred to chair/commode (P)   -KR    Row Name 10/04/22 0800          How much help from another person do you currently need...    Turning from your back to your side while in  flat bed without using bedrails? 3  -TS     Moving from lying on back to sitting on the side of a flat bed without bedrails? 3  -TS     Moving to and from a bed to a chair (including a wheelchair)? 3  -TS     Standing up from a chair using your arms (e.g., wheelchair, bedside chair)? 3  -TS     Climbing 3-5 steps with a railing? 1  -TS     To walk in hospital room? 2  -TS     AM-PAC 6 Clicks Score (PT) 15  -TS     Highest level of mobility 4 --> Transferred to chair/commode  -TS     Row Name 10/04/22 1154          Functional Assessment    Outcome Measure Options AM-PAC 6 Clicks Basic Mobility (PT)  -SC           User Key  (r) = Recorded By, (t) = Taken By, (c) = Cosigned By    Initials Name Provider Type    SC Orion Reyes, PT Physical Therapist    TS Valerie Gutierrez, RN Registered Nurse    Ashley Luque, Nursing Student Nursing Student                             Physical Therapy Education                 Title: PT OT SLP Therapies (In Progress)     Topic: Physical Therapy (Done)     Point: Mobility training (Done)     Learning Progress Summary           Patient Eager, E, VU by SC at 10/4/2022 1155    Comment: reviewed safety with mobility    Acceptance, E,D, VU,NR by  at 10/3/2022 1032    Acceptance, E, VU by  at 10/2/2022 1535    Acceptance, E, VU by  at 10/1/2022 1429    Eager, E, VU by SC at 9/29/2022 1156    Comment: reviewed benefits of activity    Eager, E, VU by SC at 9/27/2022 1316    Comment: reviewed benefits of activity    Acceptance, E,D, VU,NR by LR at 9/26/2022 1128    Comment: Educated on precautions, weight bearing status, safety with mobility, correct sit<->stand t/f technique, correct gait mechanics, HEP, and progression of POC.    Acceptance, E,D, VU,NR by LR at 9/25/2022 1110    Comment: Educated on precautions, weight bearing status, safety with mobility, correct supine to sit t/f technique, correct sit<->stand t/f technique, correct gait mechanics, HEP, and progression of  POC.    Acceptance, E,D, VU,NR by LR at 9/24/2022 1523    Comment: Educated on precautions, weight bearing status, HEP, safety with mobility, correct supine to sit t/f technique, correct sit<->stand t/f technique, correct gait mechanics, and progression of POC.    Acceptance, E, VU by LG at 9/24/2022 0545    Acceptance, E, VU by LG at 9/23/2022 0531    Acceptance, E,D, VU,NR by MB at 9/22/2022 1350                   Point: Home exercise program (Done)     Learning Progress Summary           Patient Eager, E, VU by SC at 10/4/2022 1155    Comment: reviewed safety with mobility    Acceptance, E,D, VU,NR by HP at 10/3/2022 1032    Acceptance, E, VU by  at 10/2/2022 1535    Eager, E, VU by SC at 9/29/2022 1156    Comment: reviewed benefits of activity    Eager, E, VU by SC at 9/27/2022 1316    Comment: reviewed benefits of activity    Acceptance, E,D, VU,NR by LR at 9/26/2022 1128    Comment: Educated on precautions, weight bearing status, safety with mobility, correct sit<->stand t/f technique, correct gait mechanics, HEP, and progression of POC.    Acceptance, E,D, VU,NR by LR at 9/25/2022 1110    Comment: Educated on precautions, weight bearing status, safety with mobility, correct supine to sit t/f technique, correct sit<->stand t/f technique, correct gait mechanics, HEP, and progression of POC.    Acceptance, E,D, VU,NR by LR at 9/24/2022 1523    Comment: Educated on precautions, weight bearing status, HEP, safety with mobility, correct supine to sit t/f technique, correct sit<->stand t/f technique, correct gait mechanics, and progression of POC.    Acceptance, E, VU by LG at 9/24/2022 0545    Acceptance, E, VU by LG at 9/23/2022 0531    Acceptance, E,D, VU,NR by MB at 9/22/2022 1350                   Point: Body mechanics (Done)     Learning Progress Summary           Patient Eager, E, VU by SC at 10/4/2022 1155    Comment: reviewed safety with mobility    Acceptance, CECILIO [place], FLORECITA,NR by HP at 10/3/2022 1036     Acceptance, E, VU by FW at 10/2/2022 1535    Acceptance, E, VU by FW at 10/1/2022 1429    Eager, E, VU by SC at 9/29/2022 1156    Comment: reviewed benefits of activity    Eager, E, VU by SC at 9/27/2022 1316    Comment: reviewed benefits of activity    Acceptance, E,D, VU,NR by LR at 9/26/2022 1128    Comment: Educated on precautions, weight bearing status, safety with mobility, correct sit<->stand t/f technique, correct gait mechanics, HEP, and progression of POC.    Acceptance, E,D, VU,NR by LR at 9/25/2022 1110    Comment: Educated on precautions, weight bearing status, safety with mobility, correct supine to sit t/f technique, correct sit<->stand t/f technique, correct gait mechanics, HEP, and progression of POC.    Acceptance, E,D, VU,NR by LR at 9/24/2022 1523    Comment: Educated on precautions, weight bearing status, HEP, safety with mobility, correct supine to sit t/f technique, correct sit<->stand t/f technique, correct gait mechanics, and progression of POC.    Acceptance, E, VU by LG at 9/24/2022 0545    Acceptance, E, VU by LG at 9/23/2022 0531    Acceptance, E,D, VU,NR by MB at 9/22/2022 1350                   Point: Precautions (Done)     Learning Progress Summary           Patient Eager, E, VU by SC at 10/4/2022 1155    Comment: reviewed safety with mobility    Acceptance, E,D, VU,NR by  at 10/3/2022 1032    Acceptance, E, VU by FW at 10/2/2022 1535    Acceptance, E, VU by FW at 10/1/2022 1429    Eager, E, VU by SC at 9/29/2022 1156    Comment: reviewed benefits of activity    Eager, E, VU by SC at 9/27/2022 1316    Comment: reviewed benefits of activity    Acceptance, E,D, VU,NR by LR at 9/26/2022 1128    Comment: Educated on precautions, weight bearing status, safety with mobility, correct sit<->stand t/f technique, correct gait mechanics, HEP, and progression of POC.    Acceptance, E,D, VU,NR by LR at 9/25/2022 1110    Comment: Educated on precautions, weight bearing status, safety with  mobility, correct supine to sit t/f technique, correct sit<->stand t/f technique, correct gait mechanics, HEP, and progression of POC.    Acceptance, E,D, VU,NR by LR at 9/24/2022 1523    Comment: Educated on precautions, weight bearing status, HEP, safety with mobility, correct supine to sit t/f technique, correct sit<->stand t/f technique, correct gait mechanics, and progression of POC.    Acceptance, E, VU by LG at 9/24/2022 0545    Acceptance, E, VU by LG at 9/23/2022 0531    Acceptance, E,D, VU,NR by MB at 9/22/2022 1350                               User Key     Initials Effective Dates Name Provider Type Discipline    SC 06/16/21 -  Orion Reyes, PT Physical Therapist PT    LR 06/16/21 -  Modesta Engel, PT Physical Therapist PT    MB 06/16/21 -  Leatha Tobin, PT Physical Therapist PT    LG 11/16/18 -  Jak De La Rosa RN Registered Nurse Nurse     05/05/22 -  Artur Quinones, PT Physical Therapist PT     06/01/21 -  Tonja Reyes, PT Physical Therapist PT              PT Recommendation and Plan     Plan of Care Reviewed With: patient  Progress: improving  Outcome Evaluation: Patient mobility limited by droping BP when upright     Time Calculation:    PT Charges     Row Name 10/04/22 1117             Time Calculation    Start Time 1117  -SC      PT Received On 10/04/22  -SC      PT Goal Re-Cert Due Date 10/12/22  -SC              Time Calculation- PT    Total Timed Code Minutes- PT 15 minute(s)  -SC              Timed Charges    96327 - PT Therapeutic Exercise Minutes 3  -SC      03029 - Gait Training Minutes  8  -SC      44013 - PT Therapeutic Activity Minutes 10  -SC              Total Minutes    Timed Charges Total Minutes 21  -SC       Total Minutes 21  -SC            User Key  (r) = Recorded By, (t) = Taken By, (c) = Cosigned By    Initials Name Provider Type    SC Orion Reyes, PT Physical Therapist              Therapy Charges for Today     Code Description Service  Date Service Provider Modifiers Qty    [id number]  PT THERAPEUTIC ACT EA 15 MIN 10/4/2022 Orion Reyes, PT GP 1          PT G-Codes  Outcome Measure Options: AM-PAC 6 Clicks Basic Mobility (PT)  AM-PAC 6 Clicks Score (PT): 15  AM-PAC 6 Clicks Score (OT): 15    Orion Reyes PT  10/4/2022

## 2022-10-04 NOTE — PROGRESS NOTES
[facility] Medicine Services  PROGRESS NOTE    Patient Name: Melchor Hardwick III  : 1965  MRN: [id number]    Date of Admission: 2022  Primary Care Physician: Missy Up APRN    Subjective     CC: f/u septic R knee     HPI:  In bed. Orthostatic hypotension persists. Was not symptomatic when just standing at bedside but did become symptomatic later this morning when working with PT.     ROS:  Gen- No fevers, chills  CV- No chest pain, palpitations  Resp- No cough, dyspnea  GI- No N/V/D, abd pain    Objective     Vital Signs:   Temp:  [97.7 °F (36.5 °C)-98.1 °F (36.7 °C)] 97.7 °F (36.5 °C)  Heart Rate:  [70-80] 80  Resp:  [16-18] 18  BP: ()/(50-68) 129/66     Physical Exam:  Constitutional: No acute distress, awake, alert and conversant. Laying in bed   HENT: NCAT, mucous membranes moist  Respiratory: Clear to auscultation bilaterally, respiratory effort normal   Cardiovascular: RRR, no murmurs, rubs, or gallops  Gastrointestinal: Positive bowel sounds, soft, nontender, nondistended  Musculoskeletal: No bilateral ankle edema. R knee incision dressed - I did not remove for exam. L midfoot amputation stump c/d/i without erythema, edema or induration   Psychiatric: Appropriate affect, cooperative  Neurologic: Oriented x 3, moves all extremities spontaneously without focal deficts, speech clear and coherent     Results Reviewed:  LAB RESULTS:      Lab 22  1034   WBC 6.09   HEMOGLOBIN 8.6*   HEMATOCRIT 26.9*   PLATELETS 199   MCV 77.7*         Lab 10/04/22  0414 10/02/22  1004 22  1034   SODIUM 132* 131* 130*   POTASSIUM 4.5 4.5 4.6   CHLORIDE 102 95* 96*   CO2 20.0* 26.0 29.0   ANION GAP 10.0 10.0 5.0   BUN 13 15 11   CREATININE 0.56* 0.70* 0.59*   EGFR 115.0 107.5 113.2   GLUCOSE 187* 258* 232*   CALCIUM 8.1* 8.5* 8.2*         Lab 10/02/22  1004 22  1034   TOTAL PROTEIN 6.4 5.6*   ALBUMIN 2.50* 2.40*   GLOBULIN 3.9 3.2   ALT (SGPT) 34 34   AST (SGOT) 49*  61*   BILIRUBIN 0.8 0.8   ALK PHOS 320* 304*         Lab 10/02/22  1004   IRON 35*   IRON SATURATION 13*   TIBC 267*   TRANSFERRIN 179*   FERRITIN 308.80   FOLATE 16.40   VITAMIN B 12 1,032*     Brief Urine Lab Results  (Last result in the past 365 days)      Color   Clarity   Blood   Leuk Est   Nitrite   Protein   CREAT   Urine HCG        09/21/22 0141 Yellow   Cloudy   Negative   Negative   Negative   Trace               Microbiology Results Abnormal     Procedure Component Value - Date/Time    COVID PRE-OP / PRE-PROCEDURE SCREENING ORDER (NO ISOLATION) - Swab, Nasopharynx [[id number]]  (Normal) Collected: 10/04/22 1025    Lab Status: Final result Specimen: Swab from Nasopharynx Updated: 10/04/22 1109    Narrative:      The following orders were created for panel order COVID PRE-OP / PRE-PROCEDURE SCREENING ORDER (NO ISOLATION) - Swab, Nasopharynx.  Procedure                               Abnormality         Status                     ---------                               -----------         ------                     COVID-19, ABBOTT IN-HOUS...[[id number]]  Normal              Final result                 Please view results for these tests on the individual orders.    COVID-19, ABBOTT IN-HOUSE,NASAL Swab (NO TRANSPORT MEDIA) 2 HR TAT - Swab, Nasopharynx [[id number]]  (Normal) Collected: 10/04/22 1025    Lab Status: Final result Specimen: Swab from Nasopharynx Updated: 10/04/22 1109     COVID19 Presumptive Negative    Narrative:      Fact sheet for providers: https://www.fda.gov/media/837400/download     Fact sheet for patients: https://www.fda.gov/media/781943/download    Test performed by PCR.  If inconsistent with clinical signs and symptoms patient should be tested with different authorized molecular test.    Blood Culture - Blood, Arm, Left [[id number]]  (Normal) Collected: 09/26/22 1126    Lab Status: Final result Specimen: Blood from Arm, Left Updated: 10/01/22 1232     Blood Culture No growth at 5 days     Blood Culture - Blood, Leg, Left [[id number]]  (Normal) Collected: 09/26/22 1126    Lab Status: Final result Specimen: Blood from Leg, Left Updated: 10/01/22 1232     Blood Culture No growth at 5 days    Anaerobic Culture 10 Day Incubation - Tissue, Knee, Right [[id number]] Collected: 09/21/22 1543    Lab Status: Final result Specimen: Tissue from Knee, Right Updated: 10/01/22 0648     Anaerobic Culture No anaerobes isolated at 10 days    Anaerobic Culture 10 Day Incubation - Tissue, Knee, Right [[id number]] Collected: 09/21/22 1543    Lab Status: Final result Specimen: Tissue from Knee, Right Updated: 10/01/22 0648     Anaerobic Culture No anaerobes isolated at 10 days    Anaerobic Culture 10 Day Incubation - Synovium, Knee, Right [[id number]] Collected: 09/21/22 1541    Lab Status: Final result Specimen: Synovium from Knee, Right Updated: 10/01/22 0648     Anaerobic Culture No anaerobes isolated at 10 days    Fungus Culture - Synovium, Knee, Right [[id number]] Collected: 09/21/22 1541    Lab Status: Preliminary result Specimen: Synovium from Knee, Right Updated: 09/28/22 1748     Fungus Culture No fungus isolated at 1 week    Fungus Culture - Tissue, Knee, Right [[id number]] Collected: 09/21/22 1543    Lab Status: Preliminary result Specimen: Tissue from Knee, Right Updated: 09/28/22 1748     Fungus Culture No fungus isolated at 1 week    Fungus Culture - Tissue, Knee, Right [[id number]] Collected: 09/21/22 1543    Lab Status: Preliminary result Specimen: Tissue from Knee, Right Updated: 09/28/22 1748     Fungus Culture No fungus isolated at 1 week    AFB Culture - Synovium, Knee, Right [[id number]] Collected: 09/21/22 1541    Lab Status: Preliminary result Specimen: Synovium from Knee, Right Updated: 09/28/22 1748     AFB Culture No AFB isolated at 1 week     AFB Stain No acid fast bacilli seen on concentrated smear        No radiology results from the last 24 hrs    Results for orders placed during the hospital  encounter of 09/20/22    Adult Transesophageal Echo (TYRESE) W/ Cont if Necessary Per Protocol    Interpretation Summary  · Normal left ventricular cavity size and wall thickness noted. All left ventricular wall segments contract normally. Estimated EF 60%.  · Normal RV structure and function  · Normal mitral valve with no significant stenosis or regurgitation  · Normal aortic valve with no significant stenosis or regurgitation, valve is trileaflet.  · Tricuspid valve has mild regurgitation, no evidence of vegetation on this exam  · Pulmonic valve is normal with no significant regurgitation seen in normal leaflet opening.  · No source of intracardiac infection identified. If there remains high clinical suspicion for infective endocarditis consider repeat study in 4 to 5 days.    I have reviewed the medications:  Scheduled Meds:acetaminophen, 1,000 mg, Oral, Q8H  aspirin, 81 mg, Oral, Q12H  DAPTOmycin, 8 mg/kg (Adjusted), Intravenous, Q24H  ferric gluconate, 250 mg, Intravenous, Daily  [START ON 10/5/2022] furosemide, 40 mg, Oral, Daily  insulin detemir, 24 Units, Subcutaneous, Q12H  insulin lispro, 0-9 Units, Subcutaneous, TID AC  Insulin Lispro, 18 Units, Subcutaneous, TID With Meals  multivitamin, 1 tablet, Oral, Daily  [START ON 10/5/2022] nadolol, 10 mg, Oral, Q24H  [START ON 10/5/2022] potassium chloride, 20 mEq, Oral, Daily  senna-docusate sodium, 2 tablet, Oral, BID  sodium chloride, 10 mL, Intravenous, Q12H  [START ON 10/5/2022] spironolactone, 25 mg, Oral, Daily  traZODone, 50 mg, Oral, Nightly      Continuous Infusions:sodium chloride, 75 mL/hr, Last Rate: 75 mL/hr (10/04/22 1145)      PRN Meds:.•  acetaminophen **OR** acetaminophen **OR** acetaminophen  •  senna-docusate sodium **AND** polyethylene glycol **AND** bisacodyl **AND** bisacodyl  •  dextrose  •  dextrose  •  diphenhydrAMINE **OR** diphenhydrAMINE  •  glucagon (human recombinant)  •  hydrOXYzine  •  magnesium sulfate **OR** magnesium sulfate  **OR** magnesium sulfate  •  [] HYDROmorphone **AND** naloxone  •  naloxone  •  ondansetron **OR** ondansetron  •  oxyCODONE  •  potassium chloride **OR** potassium chloride **OR** potassium chloride  •  sodium chloride    Assessment & Plan     Active Hospital Problems    Diagnosis  POA   • **Pyogenic arthritis of right knee joint, due to unspecified organism (HCC) [M00.9]  Yes   • MRSA bacteremia [R78.81, B95.62]  Yes   • 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     Brief Hospital Course to date:  Melchor Hardwick III is a 57 y.o. male with PMH significant for HTN, HLD, insulin-dependent DMII, PAZ cirrhosis, chronic anemia and obesity. Recently admitted to [facility] 5/10-22 for sepsis secondary to L foot gas gangrene/osteomyelitis with associated MRSA bacteremia. He required L midfoot amputation. He was diagnosed with PAZ cirrhosis with ascites / portal hypertension during this admission. He transferred to [facility] acute rehab -22. Evaluated by PCP 9/15/22 due to R knee pain. Presented to [facility] ED 22 due to worsening R knee pain. Found to have R knee septic arthritis with associated MRSA bacteremia and tricuspid valve endocarditis.     He underwent I&D with Dr. Bentley on 22, gross purulence noted. Both surgical and blood cultures have grown MRSA. TTE revealed a possible tricuspid valve vegetation. CT Surgery consulted for TYRESE, which did not show vegetation. CTS has signed off. Infectious Disease is following.  Planning for 6 wks of IV daptomycin with TTE in 2-4 weeks.     R knee septic arthritis  MRSA bacteremia  Tricuspid valve endocarditis  - s/p knee aspiration in ED - > 50K WBCs on micro  - s/p R  knee I&D by Dr. Bentley 9/22/22 - gross purulence encountered intraoperatively. Cultures (+) MRSA  - 9/20/22 blood cultures growing MRSA.   - Repeat blood cultures from 9/26/22 - NGTD   - 9/23/22 TTE concerning for 1 x 0.4cm mobile echodensity on tricuspid valve, concerning for vegetation  - 9/26/22 TTE did not show vegetation. CT surgery has evaluated and signed off  - Appreciate ID assistance. Will need 6-8 weeks of IV Daptomycin. Repeat TTE in 2-4 weeks    Orthostatic hypotension  - Per patient, new issue this admission  - Holding all BP meds  - Continue NS - reduce rate to 75mL/hr  - May have neuropathy from DM that is contributing  - AM orthostatics    Hypokalemia  Hypomagenesemia  - Replace per protocol      Chronic anemia   Thrombocytopenia, resolved  - Iron studies consistent with iron deficiency with anemia of chronic disease  - Continue IV iron daily x 5 days or until DC. Then transition to PO supplement  - Folate / B12 levels normal     Poorly-controlled DMII  - Hgb A1c 8.8%  - DC Metformin - contraindicated with cirrhosis  - Increase Levemir 25 units BID, Lispro 20 units TID AC + SSI   - DM educator has seen    R buttock wound  - Present on arrival, now with skin tear  - WOC following       PAZ cirrhosis  - Diagnosed in June 2022 during recent admission at [facility]  - Now issues with orthostatic hypotension  - Holding diuretics and Nadolol for now  - Outpatient GI follow up as previously planned, will need GI referral at discharge     Recent osteomyelitis of left foot with MRSA bacteremia, s/p amputation left foot May 2022  - Was hospitalized at [facility] due to left foot osteomyelitis, initially had a washout and IV antibiotics but due to no improvement, ultimately had midfoot amputation performed by Podiatry on 5/18/22. He had MRSA bacteremia associated with this and completed 8 week course of IV Vancomycin.     Constipation, resolved  - Continue bowel regimen     Insomnia  Anxiety  -  Continue Trazodone 50 mg QHS and PRN Hydroxyzine TID     Expected Discharge Location and Transportation:  [place] via EMS  Expected Discharge Date: 10/5/22     DVT prophylaxis:Mechanical DVT prophylaxis orders are present.     AM-PAC 6 Clicks Score (PT): 15 (10/04/22 7194)    CODE STATUS:   Code Status and Medical Interventions:   Ordered at: 09/21/22 0012     Level Of Support Discussed With:    Patient     Code Status (Patient has no pulse and is not breathing):    CPR (Attempt to Resuscitate)     Medical Interventions (Patient has pulse or is breathing):    Full     Nova Galeas PA-C  10/04/22

## 2022-10-04 NOTE — PLAN OF CARE
Problem: Adult Inpatient Plan of Care  Goal: Plan of Care Review  Recent Flowsheet Documentation  Taken 10/4/2022 1332 by Kacie Hughes OT  Progress: improving  Plan of Care Reviewed With: patient  Outcome Evaluation: Pt is A/Ox4 and motivated to work with therapy. OOB activity continues to be limited by acute hypotension. Education reinforced for HEP. Pt completes 3x10 reps BLE AROM ther ex and BUE theraband HEP to support self-care performance and transfer training. Set-up simple grooming/self-feeding. OT will continue to follow IP. Plan is rehab when medically ready.

## 2022-10-04 NOTE — PLAN OF CARE
Problem: Adult Inpatient Plan of Care  Goal: Plan of Care Review  Flowsheets (Taken 10/4/2022 1703)  Progress: improving  Plan of Care Reviewed With: patient  Outcome Evaluation: Patient VSS, orthostatic this morning, RA, A&Ox4. No c/o pain. Bottom/coccyx red/blanchable, z-guard applied. Specialty bed in place, q2 turn. Voiding well. BM today. Optifoam dressing to knee CDI. Possible d/c tmr to facility. COVID done. Continue to monitor.  Goal: Absence of Hospital-Acquired Illness or Injury  Intervention: Identify and Manage Fall Risk  Recent Flowsheet Documentation  Taken 10/4/2022 1620 by Valerie Gutierrez, RN  Safety Promotion/Fall Prevention:   activity supervised   assistive device/personal items within reach   clutter free environment maintained   gait belt   fall prevention program maintained   toileting scheduled   safety round/check completed   room organization consistent   nonskid shoes/slippers when out of bed  Taken 10/4/2022 1423 by Valerie Gutierrez, RN  Safety Promotion/Fall Prevention:   activity supervised   assistive device/personal items within reach   clutter free environment maintained   fall prevention program maintained   gait belt   toileting scheduled   safety round/check completed   room organization consistent   nonskid shoes/slippers when out of bed  Taken 10/4/2022 1222 by Valerie Gutierrez, RN  Safety Promotion/Fall Prevention:   activity supervised   assistive device/personal items within reach   clutter free environment maintained   fall prevention program maintained   gait belt   toileting scheduled   safety round/check completed   room organization consistent   nonskid shoes/slippers when out of bed  Taken 10/4/2022 1030 by Valerie Gutierrez, RN  Safety Promotion/Fall Prevention:   activity supervised   assistive device/personal items within reach   clutter free environment maintained   fall prevention program maintained   gait belt   toileting scheduled   safety round/check  completed   room organization consistent   nonskid shoes/slippers when out of bed  Taken 10/4/2022 0800 by Valerie Gutierrez RN  Safety Promotion/Fall Prevention:   activity supervised   assistive device/personal items within reach   clutter free environment maintained   fall prevention program maintained   gait belt   toileting scheduled   safety round/check completed   room organization consistent   nonskid shoes/slippers when out of bed  Intervention: Prevent Skin Injury  Recent Flowsheet Documentation  Taken 10/4/2022 1620 by Valerie Gutierrez RN  Body Position:   turned   right  Skin Protection:   adhesive use limited   transparent dressing maintained   tubing/devices free from skin contact  Taken 10/4/2022 1423 by Valerie Gutierrez RN  Body Position:   tilted   right  Skin Protection:   adhesive use limited   tubing/devices free from skin contact   transparent dressing maintained  Taken 10/4/2022 1310 by Valerie Gutierrez RN  Body Position:   turned   tilted   right  Taken 10/4/2022 1222 by Valerie Gutierrez RN  Body Position: (up in chair) other (see comments)  Skin Protection:   adhesive use limited   transparent dressing maintained   tubing/devices free from skin contact  Taken 10/4/2022 1030 by Valerie Gutierrez RN  Body Position: sitting up in bed  Skin Protection:   adhesive use limited   transparent dressing maintained   tubing/devices free from skin contact  Taken 10/4/2022 0800 by Valerie Gutierrez RN  Body Position:   weight shifting   sitting up in bed  Skin Protection:   adhesive use limited   transparent dressing maintained   tubing/devices free from skin contact  Intervention: Prevent and Manage VTE (Venous Thromboembolism) Risk  Recent Flowsheet Documentation  Taken 10/4/2022 1620 by Valerie Gutierrez RN  VTE Prevention/Management:   right   foot pump device on   left   sequential compression devices on  Taken 10/4/2022 1423 by Valerie Gutierrez RN  VTE Prevention/Management:   right    foot pump device on   left   sequential compression devices on  Taken 10/4/2022 1222 by Valerie Gutierrez RN  VTE Prevention/Management:   right   foot pump device on   left   sequential compression devices on  Taken 10/4/2022 1030 by Valerie Gutierrez RN  VTE Prevention/Management:   right   foot pump device on   left   sequential compression devices on  Taken 10/4/2022 0800 by Valerie Gutierrez RN  VTE Prevention/Management:   right   foot pump device on   left   sequential compression devices on  Intervention: Prevent Infection  Recent Flowsheet Documentation  Taken 10/4/2022 1620 by Valerie Gutierrez RN  Infection Prevention: environmental surveillance performed  Taken 10/4/2022 1423 by Valerie Gutiererz RN  Infection Prevention: environmental surveillance performed  Taken 10/4/2022 1222 by Valerie Gutierrez RN  Infection Prevention: environmental surveillance performed  Taken 10/4/2022 1030 by Valerie Gutierrez RN  Infection Prevention: environmental surveillance performed  Taken 10/4/2022 0800 by Valerie Gutierrez RN  Infection Prevention: environmental surveillance performed  Goal: Optimal Comfort and Wellbeing  Intervention: Provide Person-Centered Care  Recent Flowsheet Documentation  Taken 10/4/2022 1620 by Valerie Gutierrez RN  Trust Relationship/Rapport:   care explained   choices provided  Taken 10/4/2022 1423 by Valerie Gutierrez RN  Trust Relationship/Rapport:   care explained   choices provided  Taken 10/4/2022 1222 by Valerie Gutierrez RN  Trust Relationship/Rapport:   care explained   choices provided  Taken 10/4/2022 1030 by Valerie Gutierrez RN  Trust Relationship/Rapport:   care explained   choices provided   questions encouraged   questions answered   thoughts/feelings acknowledged   reassurance provided  Taken 10/4/2022 0800 by Valerie Gutierrez RN  Trust Relationship/Rapport:   care explained   choices provided   questions encouraged   questions answered   reassurance provided    thoughts/feelings acknowledged     Problem: Fall Injury Risk  Goal: Absence of Fall and Fall-Related Injury  Intervention: Identify and Manage Contributors  Recent Flowsheet Documentation  Taken 10/4/2022 0800 by Valerie Gutierrez, RN  Medication Review/Management: medications reviewed  Intervention: Promote Injury-Free Environment  Recent Flowsheet Documentation  Taken 10/4/2022 1620 by Valerie Gutierrez, RN  Safety Promotion/Fall Prevention:   activity supervised   assistive device/personal items within reach   clutter free environment maintained   gait belt   fall prevention program maintained   toileting scheduled   safety round/check completed   room organization consistent   nonskid shoes/slippers when out of bed  Taken 10/4/2022 1423 by Valerie Gutierrez, RN  Safety Promotion/Fall Prevention:   activity supervised   assistive device/personal items within reach   clutter free environment maintained   fall prevention program maintained   gait belt   toileting scheduled   safety round/check completed   room organization consistent   nonskid shoes/slippers when out of bed  Taken 10/4/2022 1222 by Valerie Gutierrez, RN  Safety Promotion/Fall Prevention:   activity supervised   assistive device/personal items within reach   clutter free environment maintained   fall prevention program maintained   gait belt   toileting scheduled   safety round/check completed   room organization consistent   nonskid shoes/slippers when out of bed  Taken 10/4/2022 1030 by Valerie Gutierrez, RN  Safety Promotion/Fall Prevention:   activity supervised   assistive device/personal items within reach   clutter free environment maintained   fall prevention program maintained   gait belt   toileting scheduled   safety round/check completed   room organization consistent   nonskid shoes/slippers when out of bed  Taken 10/4/2022 0800 by Valerie Gutierrez, RN  Safety Promotion/Fall Prevention:   activity supervised   assistive device/personal  items within reach   clutter free environment maintained   fall prevention program maintained   gait belt   toileting scheduled   safety round/check completed   room organization consistent   nonskid shoes/slippers when out of bed     Problem: Diabetes Comorbidity  Goal: Blood Glucose Level Within Targeted Range  Intervention: Monitor and Manage Glycemia  Recent Flowsheet Documentation  Taken 10/4/2022 0800 by Valerie Gutierrez RN  Glycemic Management: blood glucose monitored     Problem: Hypertension Comorbidity  Goal: Blood Pressure in Desired Range  Intervention: Maintain Blood Pressure Management  Recent Flowsheet Documentation  Taken 10/4/2022 0800 by Valerie Gutierrez RN  Medication Review/Management: medications reviewed     Problem: Skin Injury Risk Increased  Goal: Skin Health and Integrity  Intervention: Optimize Skin Protection  Recent Flowsheet Documentation  Taken 10/4/2022 1620 by Valerie Gutierrez RN  Pressure Reduction Techniques:   frequent weight shift encouraged   heels elevated off bed   positioned off wounds   weight shift assistance provided  Head of Bed (HOB) Positioning: HOB at 30-45 degrees  Pressure Reduction Devices:   pressure-redistributing mattress utilized   positioning supports utilized  Skin Protection:   adhesive use limited   transparent dressing maintained   tubing/devices free from skin contact  Taken 10/4/2022 1423 by Valerie Gutierrez RN  Pressure Reduction Techniques: frequent weight shift encouraged  Head of Bed (HOB) Positioning: HOB at 20-30 degrees  Pressure Reduction Devices: pressure-redistributing mattress utilized  Skin Protection:   adhesive use limited   tubing/devices free from skin contact   transparent dressing maintained  Taken 10/4/2022 1310 by Valerie Gutierrez RN  Head of Bed (HOB) Positioning: HOB at 20-30 degrees  Taken 10/4/2022 1222 by Valerie Gutierrez RN  Pressure Reduction Techniques: frequent weight shift encouraged  Head of Bed (HOB)  Positioning: HOB at 60-90 degrees  Pressure Reduction Devices: pressure-redistributing mattress utilized  Skin Protection:   adhesive use limited   transparent dressing maintained   tubing/devices free from skin contact  Taken 10/4/2022 1030 by Valerie Gutierrez RN  Pressure Reduction Techniques: frequent weight shift encouraged  Head of Bed (HOB) Positioning: HOB at 30-45 degrees  Pressure Reduction Devices: pressure-redistributing mattress utilized  Skin Protection:   adhesive use limited   transparent dressing maintained   tubing/devices free from skin contact  Taken 10/4/2022 0800 by Valerie Gutierrez, JD  Pressure Reduction Techniques:   frequent weight shift encouraged   weight shift assistance provided  Head of Bed (HOB) Positioning: HOB at 60-90 degrees  Pressure Reduction Devices: pressure-redistributing mattress utilized  Skin Protection:   adhesive use limited   transparent dressing maintained   tubing/devices free from skin contact     Problem: Ongoing Anesthesia Effects (Surgery Nonspecified)  Goal: Anesthesia/Sedation Recovery  Intervention: Optimize Anesthesia Recovery  Recent Flowsheet Documentation  Taken 10/4/2022 1620 by Valerie Gutierrez, RN  Safety Promotion/Fall Prevention:   activity supervised   assistive device/personal items within reach   clutter free environment maintained   gait belt   fall prevention program maintained   toileting scheduled   safety round/check completed   room organization consistent   nonskid shoes/slippers when out of bed  Taken 10/4/2022 1423 by Valerie Gutierrez, RN  Safety Promotion/Fall Prevention:   activity supervised   assistive device/personal items within reach   clutter free environment maintained   fall prevention program maintained   gait belt   toileting scheduled   safety round/check completed   room organization consistent   nonskid shoes/slippers when out of bed  Taken 10/4/2022 1222 by Valerie Gutierrez, RN  Safety Promotion/Fall Prevention:    activity supervised   assistive device/personal items within reach   clutter free environment maintained   fall prevention program maintained   gait belt   toileting scheduled   safety round/check completed   room organization consistent   nonskid shoes/slippers when out of bed  Taken 10/4/2022 1030 by Valerie Gutierrez RN  Safety Promotion/Fall Prevention:   activity supervised   assistive device/personal items within reach   clutter free environment maintained   fall prevention program maintained   gait belt   toileting scheduled   safety round/check completed   room organization consistent   nonskid shoes/slippers when out of bed  Taken 10/4/2022 0800 by Valerie Gutierrez RN  Safety Promotion/Fall Prevention:   activity supervised   assistive device/personal items within reach   clutter free environment maintained   fall prevention program maintained   gait belt   toileting scheduled   safety round/check completed   room organization consistent   nonskid shoes/slippers when out of bed     Problem: Pain (Surgery Nonspecified)  Goal: Acceptable Pain Control  Intervention: Prevent or Manage Pain  Recent Flowsheet Documentation  Taken 10/4/2022 1620 by Valerie Gutierrez RN  Diversional Activities: television  Taken 10/4/2022 1423 by Valerie Gutierrez RN  Diversional Activities: television  Taken 10/4/2022 1222 by Valerie Gutierrez RN  Diversional Activities: television  Taken 10/4/2022 1030 by Valerie Gutierrez RN  Diversional Activities: television  Taken 10/4/2022 0800 by Valerie Gutierrez RN  Diversional Activities: television     Problem: Respiratory Compromise (Surgery Nonspecified)  Goal: Effective Oxygenation and Ventilation  Intervention: Optimize Oxygenation and Ventilation  Recent Flowsheet Documentation  Taken 10/4/2022 1620 by Valerie Gutierrez RN  Head of Bed (HOB) Positioning: HOB at 30-45 degrees  Taken 10/4/2022 1423 by Valerie Gutierrez RN  Head of Bed (HOB) Positioning: HOB at 20-30  degrees  Taken 10/4/2022 1310 by Valerie Gutierrez, RN  Head of Bed (HOB) Positioning: HOB at 20-30 degrees  Taken 10/4/2022 1222 by Valerie Gutierrez RN  Head of Bed (HOB) Positioning: HOB at 60-90 degrees  Taken 10/4/2022 1030 by Valerie Gutierrez RN  Head of Bed (HOB) Positioning: HOB at 30-45 degrees  Taken 10/4/2022 0800 by Valerie Gutierrez RN  Head of Bed (HOB) Positioning: HOB at 60-90 degrees   Goal Outcome Evaluation:  Plan of Care Reviewed With: patient        Progress: improving  Outcome Evaluation: Patient VSS, orthostatic this morning, RA, A&Ox4. No c/o pain. Bottom/coccyx red/blanchable, z-guard applied. Specialty bed in place, q2 turn. Voiding well. BM today. Optifoam dressing to knee CDI. Possible d/c tmr to facility. COVID done. Continue to monitor.

## 2022-10-04 NOTE — PLAN OF CARE
Goal Outcome Evaluation:  Plan of Care Reviewed With: patient        Progress: no change       AOx4, VSS WNL, RA, optifoam CDI, pain controlled with PO meds, pt had difficulty ambulating this shift became dizzy, lightheaded, voiding spontaneously

## 2022-10-04 NOTE — PROGRESS NOTES
[facility] Progress Note        Antibiotics:  Anti-Infectives (From admission, onward)    Ordered     Dose/Rate Route Frequency Start Stop    09/25/22 0753  DAPTOmycin (CUBICIN) 650 mg in sodium chloride 0.9 % 50 mL IVPB        Ordering Provider: Bryce Euceda MD    8 mg/kg × 83 kg (Adjusted)  100 mL/hr over 30 Minutes Intravenous Every 24 Hours 09/25/22 0900 11/06/22 0859    09/21/22 1358  ceFAZolin in dextrose (ANCEF) IVPB solution 2 g        Ordering Provider: Brain Bentley MD    2 g  over 30 Minutes Intravenous Once 09/21/22 1445 09/21/22 1512    09/21/22 0204  vancomycin 2000 mg/500 mL 0.9% NS IVPB ([facility])        Ordering Provider: Ronen Payan DO    2,000 mg  over 120 Minutes Intravenous Once 09/21/22 0300 09/21/22 0554          CC: fatigue    HPI:      Patient is a 57 y.o.  Yr old male with history cirrhosis/diabetes and other comorbidity as outlined below.  History of left foot gas gangrene with osteomyelitis and MRSA bacteremia treated in [place] by Dr. Giordano in May/June 2022.  Family reports left transmetatarsal amputation in approximately 8 weeks IV vancomycin.  Notes from [place] indicate transthoracic echocardiogram no vegetation per Dr. Giordano; he thinks he might of had several weeks of oral antibiotic after that but not entirely clear.  He is admitted to [facility] September 20 with progressive right knee pain occurring over the past week preceding admission.  He thinks he might of twisted it but ended up with progressive redness/swelling and pain.  No specific blunt force or penetrating trauma.  No animal insect arthropod bite.  No open wounds or active drainage.  No prior history of VRE C. difficile or ESBL/K PC/CRE organisms; he was evaluated by orthopedics and taken to the operating room for right knee incision/drainage and concern for septic arthritis per Dr. Bentley; blood cultures have shown gram-positive cocci and initial PCR data suggesting MRSA.  Had dysuria but urinalysis  not consistent with UTI.  No hematuria or pyuria    9/23 with TV echodensity     9/26/22  TYRESE no vegetation per cardiology    10/4/22    Doing okay overall. no oxygen;  Per nursing,  no new focal pain or distress; postop Right knee pain is  dull at present,  Better controlled per nursing overall,   nonradiating, worse with movement, better with pain meds and 2  out of 10 in severity at present; he denies any other focal musculoskeletal pain.  No back pain. No myalgia     Left foot with no redness/swelling or pain.  Surgical site healed with no open wound or active drainage.     No headache photophobia or neck stiffness.  No nausea vomiting diarrhea or abdominal pain.  No shortness of breath cough or hemoptysis.  No other new skin rash.  No other recent procedures or interventions.  No indwelling prosthetic material otherwise.  No indwelling pacemaker chronic lines       ROS:      10/4/22 per nursing; No f/c/s. No n/v/d. No rash. No new ADR to Abx.       Constitutional-- No Fever, chills or sweats.  Appetite diminished with fatigue.  Heent-- No new vision, hearing or throat complaints.  No epistaxis or oral sores.  Denies odynophagia or dysphagia.  No flashers, floaters or eye pain.   No headache, photophobia or neck stiffness.  CV-- No chest pain, palpitation or syncope  Resp-- No SOB/cough/Hemoptysis  GI- No nausea, vomiting, or diarrhea.  No hematochezia, melena, or hematemesis. Denies jaundice  -- No dysuria, hematuria, or flank pain.  Denies hesitancy, urgency or flank pain.  Lymph- no swollen lymph nodes in neck/axilla or groin.   Heme- No active bruising or bleeding; no Hx of DVT or PE.  MS-- no swelling or pain in the bones or joints of arms/legs.  No new back pain.  Neuro-- No acute focal weakness or numbness in the arms or legs.  No seizures.     Full 12 point review of systems reviewed and negative otherwise for acute complaints, except for above       PE:   BP 93/53 (BP Location: Left arm, Patient  "Position: Standing)   Pulse 70   Temp 97.7 °F (36.5 °C) (Oral)   Resp 18   Ht 177.8 cm (70\")   Wt 98 kg (216 lb)   SpO2 100%   BMI 30.99 kg/m²       GENERAL: awake, in no acute distress.  Chronically ill-appearing  HEENT: Normocephalic, atraumatic.   No conjunctival injection. No icterus. Oropharynx clear without evidence of thrush or exudate. No evidence of peridontal disease.    NECK: Supple without nuchal rigidity. No mass.   HEART: RRR; soft murmur LSB, rubs, gallops.   LUNGS: Diminished at bases but otherwise clear to auscultation bilaterally without wheezing, rales, rhonchi. Normal respiratory effort. Nonlabored. No dullness.  ABDOMEN: Soft, nontender, nondistended. Positive bowel sounds. No rebound or guarding. NO mass or HSM.  EXT:  No cyanosis, clubbing or edema. No cord.   MSK: FROM without joint effusions noted arms/legs.    SKIN: Warm and dry without cutaneous eruptions on Inspection/palpation.    NEURO: awake     Left foot with no redness, induration or warmth.  No discrete mass bulge or fluctuance.  No crepitus or bulla.  Prior surgical site healed at [facility].  No open wound or active drainage; no tenderness     Right knee postop surgical noted.  no new redness induration or warmth.  No discrete mass bulge or fluctuance.  No crepitus or bulla.     No peripheral stigmata/phenomena of endocarditis     IV without obvious redness or drainage    Laboratory Data    Results from last 7 days   Lab Units 09/29/22  1034   WBC 10*3/mm3 6.09   HEMOGLOBIN g/dL 8.6*   HEMATOCRIT % 26.9*   PLATELETS 10*3/mm3 199     Results from last 7 days   Lab Units 10/04/22  0414   SODIUM mmol/L 132*   POTASSIUM mmol/L 4.5   CHLORIDE mmol/L 102   CO2 mmol/L 20.0*   BUN mg/dL 13   CREATININE mg/dL 0.56*   GLUCOSE mg/dL 187*   CALCIUM mg/dL 8.1*     Results from last 7 days   Lab Units 10/02/22  1004   ALK PHOS U/L 320*   BILIRUBIN mg/dL 0.8   ALT (SGPT) U/L 34   AST (SGOT) U/L 49*               Estimated Creatinine Clearance: " 170.9 mL/min (A) (by C-G formula based on SCr of 0.56 mg/dL (L)).      Microbiology:      Radiology:  Imaging Results (Last 72 Hours)     ** No results found for the last 72 hours. **            Impression:         --Acute MRSA bacteremia/septicemia, recurrent with prior history positive cultures May 2022 and recurrent despite prior 8 weeks of IV vancomycin and left foot surgery in addition to other supportive measures.   TTE with ? TV echodensity;   IV daptomycin 8 mg/kg initiated with pharmacy assisting with dosing.  High risk for further serious morbidity and other serious sequela including persistent/progressive or recurrent infection, metastatic foci of involvement and other dire consequences. TYRESE NO VEGETATION per cardiology; at risk for occult endovascular focus but unable to confirm so far.  Nonetheless, given circumstances he will receive ongoing treatment empirically for potential occult endovascular focus     --Acute right knee pain/septic arthritis with surgical intervention, incision/debridement by Dr. Bentley on September 21.   MRSA+ in the setting of MRSA bacteremia.     --History of cirrhosis by records.  Unclear etiology.   further GI referral/eval per  medicine team     --Diabetes.  You need to tightly control blood sugar to give best chance for healing.;  Described as uncontrolled by medicine team at admission    --TCP better     PLAN:       -- IV daptomycin likely at least 6 weeks from negative blood cultures 9/26 , potentially step down to oral abx after that depending on clinical course     -- Check/review labs cultures and scans     -- Partial history per nursing staff       -- Highly complex set of issues with high risk for further serious morbidity and other serious sequela    --TYRESE noted; consider repeat TTE 2-4 weeks to evaluate for any evolving change;  Sooner if clinically worse    --anticipate rehab    --case management looking into options    --d/w wife    Bryce Euceda,  MD  10/4/2022

## 2022-10-04 NOTE — PLAN OF CARE
Goal Outcome Evaluation:  Plan of Care Reviewed With: patient        Progress: improving  Outcome Evaluation: Patient mobility limited by droping BP when upright

## 2022-10-05 ENCOUNTER — APPOINTMENT (OUTPATIENT)
Dept: GENERAL RADIOLOGY | Facility: HOSPITAL | Age: 57
End: 2022-10-05

## 2022-10-05 ENCOUNTER — HOSPITAL ENCOUNTER (OUTPATIENT)
Facility: HOSPITAL | Age: 57
End: 2022-10-28
Attending: INTERNAL MEDICINE | Admitting: INTERNAL MEDICINE

## 2022-10-05 ENCOUNTER — READMISSION MANAGEMENT (OUTPATIENT)
Dept: CALL CENTER | Facility: HOSPITAL | Age: 57
End: 2022-10-05

## 2022-10-05 VITALS
SYSTOLIC BLOOD PRESSURE: 137 MMHG | HEART RATE: 99 BPM | DIASTOLIC BLOOD PRESSURE: 66 MMHG | RESPIRATION RATE: 16 BRPM | WEIGHT: 216 LBS | OXYGEN SATURATION: 98 % | TEMPERATURE: 98 F | BODY MASS INDEX: 30.92 KG/M2 | HEIGHT: 70 IN

## 2022-10-05 PROBLEM — Z79.4 TYPE 2 DIABETES MELLITUS WITH HYPERGLYCEMIA, WITH LONG-TERM CURRENT USE OF INSULIN: Status: ACTIVE | Noted: 2017-07-24

## 2022-10-05 PROBLEM — I95.1 ORTHOSTATIC HYPOTENSION: Status: RESOLVED | Noted: 2022-10-05 | Resolved: 2022-10-05

## 2022-10-05 PROBLEM — E86.9 VOLUME DEPLETION: Status: RESOLVED | Noted: 2022-10-05 | Resolved: 2022-10-05

## 2022-10-05 PROBLEM — Z87.39 HISTORY OF OSTEOMYELITIS: Status: ACTIVE | Noted: 2022-10-05

## 2022-10-05 PROBLEM — S31.819A WOUND OF RIGHT BUTTOCK: Status: ACTIVE | Noted: 2022-10-05

## 2022-10-05 PROBLEM — K74.69 OTHER CIRRHOSIS OF LIVER: Status: ACTIVE | Noted: 2022-09-21

## 2022-10-05 PROBLEM — E86.9 VOLUME DEPLETION: Status: ACTIVE | Noted: 2022-10-05

## 2022-10-05 PROBLEM — E11.65 TYPE 2 DIABETES MELLITUS WITH HYPERGLYCEMIA, WITH LONG-TERM CURRENT USE OF INSULIN: Status: ACTIVE | Noted: 2017-07-24

## 2022-10-05 PROBLEM — I95.1 ORTHOSTATIC HYPOTENSION: Status: ACTIVE | Noted: 2022-10-05

## 2022-10-05 PROBLEM — M00.061 STAPHYLOCOCCAL ARTHRITIS OF RIGHT KNEE: Status: ACTIVE | Noted: 2022-09-21

## 2022-10-05 LAB
ALBUMIN SERPL-MCNC: 2.67 G/DL (ref 3.5–5.2)
ALBUMIN SERPL-MCNC: 2.7 G/DL (ref 3.5–5.2)
ALBUMIN/GLOB SERPL: 0.7 G/DL
ALBUMIN/GLOB SERPL: 0.8 G/DL
ALP SERPL-CCNC: 349 U/L (ref 39–117)
ALP SERPL-CCNC: 385 U/L (ref 39–117)
ALT SERPL W P-5'-P-CCNC: 42 U/L (ref 1–41)
ALT SERPL W P-5'-P-CCNC: 50 U/L (ref 1–41)
ANION GAP SERPL CALCULATED.3IONS-SCNC: 10.1 MMOL/L (ref 5–15)
ANION GAP SERPL CALCULATED.3IONS-SCNC: 11 MMOL/L (ref 5–15)
AST SERPL-CCNC: 52 U/L (ref 1–40)
AST SERPL-CCNC: 79 U/L (ref 1–40)
BASOPHILS # BLD AUTO: 0.02 10*3/MM3 (ref 0–0.2)
BASOPHILS NFR BLD AUTO: 0.3 % (ref 0–1.5)
BILIRUB SERPL-MCNC: 0.8 MG/DL (ref 0–1.2)
BILIRUB SERPL-MCNC: 0.8 MG/DL (ref 0–1.2)
BILIRUB UR QL STRIP: NEGATIVE
BUN SERPL-MCNC: 10 MG/DL (ref 6–20)
BUN SERPL-MCNC: 13 MG/DL (ref 6–20)
BUN/CREAT SERPL: 20 (ref 7–25)
BUN/CREAT SERPL: 21.3 (ref 7–25)
CALCIUM SPEC-SCNC: 8.2 MG/DL (ref 8.6–10.5)
CALCIUM SPEC-SCNC: 8.7 MG/DL (ref 8.6–10.5)
CHLORIDE SERPL-SCNC: 101 MMOL/L (ref 98–107)
CHLORIDE SERPL-SCNC: 101 MMOL/L (ref 98–107)
CLARITY UR: CLEAR
CO2 SERPL-SCNC: 22 MMOL/L (ref 22–29)
CO2 SERPL-SCNC: 23.9 MMOL/L (ref 22–29)
COLOR UR: YELLOW
CREAT SERPL-MCNC: 0.5 MG/DL (ref 0.76–1.27)
CREAT SERPL-MCNC: 0.61 MG/DL (ref 0.76–1.27)
CRP SERPL-MCNC: 8.62 MG/DL (ref 0–0.5)
DEPRECATED RDW RBC AUTO: 52.8 FL (ref 37–54)
DEPRECATED RDW RBC AUTO: 54.2 FL (ref 37–54)
EGFRCR SERPLBLD CKD-EPI 2021: 112 ML/MIN/1.73
EGFRCR SERPLBLD CKD-EPI 2021: 119 ML/MIN/1.73
EOSINOPHIL # BLD AUTO: 0.26 10*3/MM3 (ref 0–0.4)
EOSINOPHIL NFR BLD AUTO: 4.1 % (ref 0.3–6.2)
ERYTHROCYTE [DISTWIDTH] IN BLOOD BY AUTOMATED COUNT: 18.6 % (ref 12.3–15.4)
ERYTHROCYTE [DISTWIDTH] IN BLOOD BY AUTOMATED COUNT: 18.9 % (ref 12.3–15.4)
GLOBULIN UR ELPH-MCNC: 3.4 GM/DL
GLOBULIN UR ELPH-MCNC: 4 GM/DL
GLUCOSE BLDC GLUCOMTR-MCNC: 132 MG/DL (ref 70–130)
GLUCOSE BLDC GLUCOMTR-MCNC: 207 MG/DL (ref 70–130)
GLUCOSE BLDC GLUCOMTR-MCNC: 294 MG/DL (ref 70–130)
GLUCOSE SERPL-MCNC: 129 MG/DL (ref 65–99)
GLUCOSE SERPL-MCNC: 174 MG/DL (ref 65–99)
GLUCOSE UR STRIP-MCNC: NEGATIVE MG/DL
HCT VFR BLD AUTO: 27.8 % (ref 37.5–51)
HCT VFR BLD AUTO: 28.2 % (ref 37.5–51)
HGB BLD-MCNC: 8.6 G/DL (ref 13–17.7)
HGB BLD-MCNC: 9 G/DL (ref 13–17.7)
HGB UR QL STRIP.AUTO: NEGATIVE
IMM GRANULOCYTES # BLD AUTO: 0.02 10*3/MM3 (ref 0–0.05)
IMM GRANULOCYTES NFR BLD AUTO: 0.3 % (ref 0–0.5)
KETONES UR QL STRIP: NEGATIVE
LEUKOCYTE ESTERASE UR QL STRIP.AUTO: NEGATIVE
LYMPHOCYTES # BLD AUTO: 1.07 10*3/MM3 (ref 0.7–3.1)
LYMPHOCYTES NFR BLD AUTO: 17 % (ref 19.6–45.3)
MCH RBC QN AUTO: 25 PG (ref 26.6–33)
MCH RBC QN AUTO: 25.4 PG (ref 26.6–33)
MCHC RBC AUTO-ENTMCNC: 30.9 G/DL (ref 31.5–35.7)
MCHC RBC AUTO-ENTMCNC: 31.9 G/DL (ref 31.5–35.7)
MCV RBC AUTO: 79.4 FL (ref 79–97)
MCV RBC AUTO: 80.8 FL (ref 79–97)
MONOCYTES # BLD AUTO: 0.42 10*3/MM3 (ref 0.1–0.9)
MONOCYTES NFR BLD AUTO: 6.7 % (ref 5–12)
NEUTROPHILS NFR BLD AUTO: 4.51 10*3/MM3 (ref 1.7–7)
NEUTROPHILS NFR BLD AUTO: 71.6 % (ref 42.7–76)
NITRITE UR QL STRIP: NEGATIVE
NRBC BLD AUTO-RTO: 0 /100 WBC (ref 0–0.2)
PH UR STRIP.AUTO: 6.5 [PH] (ref 5–8)
PLATELET # BLD AUTO: 173 10*3/MM3 (ref 140–450)
PLATELET # BLD AUTO: 215 10*3/MM3 (ref 140–450)
PMV BLD AUTO: 8.6 FL (ref 6–12)
PMV BLD AUTO: 9.4 FL (ref 6–12)
POTASSIUM SERPL-SCNC: 4.3 MMOL/L (ref 3.5–5.2)
POTASSIUM SERPL-SCNC: 4.4 MMOL/L (ref 3.5–5.2)
PROT SERPL-MCNC: 6.1 G/DL (ref 6–8.5)
PROT SERPL-MCNC: 6.7 G/DL (ref 6–8.5)
PROT UR QL STRIP: NEGATIVE
QT INTERVAL: 388 MS
QTC INTERVAL: 479 MS
RBC # BLD AUTO: 3.44 10*6/MM3 (ref 4.14–5.8)
RBC # BLD AUTO: 3.55 10*6/MM3 (ref 4.14–5.8)
SODIUM SERPL-SCNC: 134 MMOL/L (ref 136–145)
SODIUM SERPL-SCNC: 135 MMOL/L (ref 136–145)
SP GR UR STRIP: 1.01 (ref 1–1.03)
UROBILINOGEN UR QL STRIP: NORMAL
WBC NRBC COR # BLD: 6.3 10*3/MM3 (ref 3.4–10.8)
WBC NRBC COR # BLD: 6.52 10*3/MM3 (ref 3.4–10.8)

## 2022-10-05 PROCEDURE — 25010000002 NA FERRIC GLUC CPLX PER 12.5 MG: Performed by: PHYSICIAN ASSISTANT

## 2022-10-05 PROCEDURE — 84134 ASSAY OF PREALBUMIN: CPT | Performed by: INTERNAL MEDICINE

## 2022-10-05 PROCEDURE — 63710000001 INSULIN LISPRO (HUMAN) PER 5 UNITS: Performed by: PHYSICIAN ASSISTANT

## 2022-10-05 PROCEDURE — 80053 COMPREHEN METABOLIC PANEL: CPT | Performed by: INTERNAL MEDICINE

## 2022-10-05 PROCEDURE — 93010 ELECTROCARDIOGRAM REPORT: CPT | Performed by: INTERNAL MEDICINE

## 2022-10-05 PROCEDURE — 97535 SELF CARE MNGMENT TRAINING: CPT

## 2022-10-05 PROCEDURE — 86140 C-REACTIVE PROTEIN: CPT | Performed by: INTERNAL MEDICINE

## 2022-10-05 PROCEDURE — 71045 X-RAY EXAM CHEST 1 VIEW: CPT | Performed by: RADIOLOGY

## 2022-10-05 PROCEDURE — 63710000001 INSULIN DETEMIR PER 5 UNITS: Performed by: PHYSICIAN ASSISTANT

## 2022-10-05 PROCEDURE — 93005 ELECTROCARDIOGRAM TRACING: CPT | Performed by: INTERNAL MEDICINE

## 2022-10-05 PROCEDURE — 71045 X-RAY EXAM CHEST 1 VIEW: CPT

## 2022-10-05 PROCEDURE — 85025 COMPLETE CBC W/AUTO DIFF WBC: CPT | Performed by: INTERNAL MEDICINE

## 2022-10-05 PROCEDURE — 99239 HOSP IP/OBS DSCHRG MGMT >30: CPT | Performed by: PHYSICIAN ASSISTANT

## 2022-10-05 PROCEDURE — 82962 GLUCOSE BLOOD TEST: CPT

## 2022-10-05 PROCEDURE — 97110 THERAPEUTIC EXERCISES: CPT

## 2022-10-05 PROCEDURE — 81003 URINALYSIS AUTO W/O SCOPE: CPT | Performed by: INTERNAL MEDICINE

## 2022-10-05 PROCEDURE — 85027 COMPLETE CBC AUTOMATED: CPT | Performed by: INTERNAL MEDICINE

## 2022-10-05 PROCEDURE — 25010000002 DAPTOMYCIN PER 1 MG: Performed by: INTERNAL MEDICINE

## 2022-10-05 RX ORDER — ACETAMINOPHEN 500 MG
1000 TABLET ORAL 3 TIMES DAILY PRN
Status: ON HOLD
Start: 2022-10-05 | End: 2022-10-28

## 2022-10-05 RX ORDER — AMOXICILLIN 250 MG
2 CAPSULE ORAL 2 TIMES DAILY
Status: ON HOLD
Start: 2022-10-05 | End: 2022-10-28

## 2022-10-05 RX ORDER — INSULIN ASPART 100 [IU]/ML
20 INJECTION, SOLUTION INTRAVENOUS; SUBCUTANEOUS
Qty: 10 ML | Refills: 12 | Status: ON HOLD
Start: 2022-10-05 | End: 2022-10-28

## 2022-10-05 RX ORDER — ONDANSETRON 4 MG/1
4 TABLET, FILM COATED ORAL EVERY 6 HOURS PRN
Status: ON HOLD
Start: 2022-10-05 | End: 2022-10-28

## 2022-10-05 RX ORDER — FERROUS SULFATE TAB EC 324 MG (65 MG FE EQUIVALENT) 324 (65 FE) MG
324 TABLET DELAYED RESPONSE ORAL
Qty: 30 TABLET | Status: ON HOLD
Start: 2022-10-05 | End: 2022-10-28

## 2022-10-05 RX ORDER — HYDROCODONE BITARTRATE AND ACETAMINOPHEN 5; 325 MG/1; MG/1
1 TABLET ORAL EVERY 6 HOURS PRN
Refills: 0 | Status: ON HOLD
Start: 2022-10-05 | End: 2022-10-28

## 2022-10-05 RX ORDER — CHOLECALCIFEROL (VITAMIN D3) 125 MCG
5 CAPSULE ORAL NIGHTLY
Status: DISCONTINUED | OUTPATIENT
Start: 2022-10-05 | End: 2022-10-05 | Stop reason: HOSPADM

## 2022-10-05 RX ORDER — TRAZODONE HYDROCHLORIDE 50 MG/1
50 TABLET ORAL NIGHTLY
Status: ON HOLD
Start: 2022-10-05 | End: 2022-10-28

## 2022-10-05 RX ORDER — ASPIRIN 81 MG/1
81 TABLET ORAL EVERY 12 HOURS SCHEDULED
Status: ON HOLD
Start: 2022-10-05 | End: 2022-10-28

## 2022-10-05 RX ORDER — ASCORBIC ACID 500 MG
500 TABLET ORAL
Status: ON HOLD
Start: 2022-10-05 | End: 2022-10-28

## 2022-10-05 RX ORDER — POLYETHYLENE GLYCOL 3350 17 G/17G
17 POWDER, FOR SOLUTION ORAL DAILY PRN
Status: ON HOLD
Start: 2022-10-05 | End: 2022-10-28

## 2022-10-05 RX ADMIN — ACETAMINOPHEN 1000 MG: 500 TABLET, FILM COATED ORAL at 10:02

## 2022-10-05 RX ADMIN — INSULIN LISPRO 20 UNITS: 100 INJECTION, SOLUTION INTRAVENOUS; SUBCUTANEOUS at 08:12

## 2022-10-05 RX ADMIN — MULTIVITAMIN TABLET 1 TABLET: TABLET at 07:53

## 2022-10-05 RX ADMIN — INSULIN DETEMIR 25 UNITS: 100 INJECTION, SOLUTION SUBCUTANEOUS at 08:12

## 2022-10-05 RX ADMIN — POTASSIUM CHLORIDE 20 MEQ: 750 CAPSULE, EXTENDED RELEASE ORAL at 07:52

## 2022-10-05 RX ADMIN — Medication 10 ML: at 07:52

## 2022-10-05 RX ADMIN — ACETAMINOPHEN 1000 MG: 500 TABLET, FILM COATED ORAL at 04:09

## 2022-10-05 RX ADMIN — SODIUM CHLORIDE 250 MG: 9 INJECTION, SOLUTION INTRAVENOUS at 08:51

## 2022-10-05 RX ADMIN — DAPTOMYCIN 650 MG: 500 INJECTION, POWDER, LYOPHILIZED, FOR SOLUTION INTRAVENOUS at 08:00

## 2022-10-05 RX ADMIN — ASPIRIN 81 MG: 81 TABLET, COATED ORAL at 07:53

## 2022-10-05 NOTE — THERAPY DISCHARGE NOTE
Acute Care - Occupational Therapy Discharge  [facility]    Patient Name: Melchor Hardwick III  : 1965    MRN: [id number]                              Today's Date: 10/5/2022       Admit Date: 2022    Visit Dx:     ICD-10-CM ICD-9-CM   1. Pyogenic arthritis of right knee joint, due to unspecified organism (HCC)  M00.9 711.06   2. Right knee pain, unspecified chronicity  M25.561 719.46   3. Generalized weakness  R53.1 780.79   4. Hyperglycemia  R73.9 790.29   5. Pyogenic arthritis of right knee joint (HCC)  M00.9 711.06   6. Staphylococcal arthritis of right knee (HCC)  M00.061 711.06     041.10     Patient Active Problem List   Diagnosis   • Hyperlipidemia   • Hypertensive disorder   • Obesity   • Type 2 diabetes mellitus with hyperglycemia, with long-term current use of insulin (HCC)   • Gas gangrene of foot (HCC)   • Cellulitis in diabetic foot (HCC)   • Other acute osteomyelitis of left foot (HCC)   • Osteomyelitis (HCC)   • Critical illness myopathy   • Staphylococcal arthritis of right knee (HCC)   • Other cirrhosis of liver (HCC)   • MRSA bacteremia   • Endocarditis of tricuspid valve   • Wound of right buttock   • History of osteomyelitis     Past Medical History:   Diagnosis Date   • Diabetes mellitus (HCC)    • Hyperlipidemia    • Hypertension    • Pyogenic arthritis of right knee joint, due to unspecified organism (HCC) 2022     Past Surgical History:   Procedure Laterality Date   • ABSCESS DRAINAGE      PERINEUM   • AMPUTATION FOOT Left 2022    Procedure: AMPUTATION FOOT;  Surgeon: Addison Colby MD;  Location: [facility] OR;  Service: Podiatry;  Laterality: Left;   • INCISION AND DRAINAGE LEG Left 05/10/2022    Procedure: INCISION AND DRAINAGE LOWER EXTREMITY;  Surgeon: Addison Colby MD;  Location: [facility] OR;  Service: Podiatry;  Laterality: Left;   • KNEE INCISION AND DRAINAGE Right 2022    Procedure: KNEE INCISION AND DRAINAGE RIGHT;  Surgeon: Brain Bentley MD;   Location:  [facility] OR;  Service: Orthopedics;  Laterality: Right;   • WOUND DEBRIDEMENT Left 05/13/2022    Procedure: DEBRIDEMENT FOOT;  Surgeon: Addison Colby MD;  Location: [facility] OR;  Service: Podiatry;  Laterality: Left;      General Information     Row Name 10/05/22 0943          OT Time and Intention    Document Type therapy note (daily note)  -TB     Mode of Treatment occupational therapy;individual therapy  -TB     Row Name 10/05/22 0943          General Information    Patient Profile Reviewed yes  -TB     Existing Precautions/Restrictions fall;brace on at all times;orthostatic hypotension  RLE KI on at all times, L foot amputation with boot on when up  -TB     Barriers to Rehab medically complex;previous functional deficit  -TB     Row Name 10/05/22 0943          Cognition    Orientation Status (Cognition) oriented x 4  -TB     Row Name 10/05/22 0943          Safety Issues, Functional Mobility    Impairments Affecting Function (Mobility) balance;endurance/activity tolerance;strength;postural/trunk control;sensation/sensory awareness  -TB     Comment, Safety Issues/Impairments (Mobility) Pt up with RW support and CGAx1 with chair follow for safety due to recurrent orthostatic hypotension  -TB           User Key  (r) = Recorded By, (t) = Taken By, (c) = Cosigned By    Initials Name Provider Type    TB Kacie Hughes OT Occupational Therapist               Mobility/ADL's     Row Name 10/05/22 0945          Bed Mobility    Bed Mobility supine-sit;scooting/bridging  -TB     Scooting/Bridging [place] (Bed Mobility) modified independence  -TB     Supine-Sit [place] (Bed Mobility) modified independence  -TB     Assistive Device (Bed Mobility) bed rails  -TB     Comment, (Bed Mobility) Pt transitions to EOB efficiently  -TB     Row Name 10/05/22 0945          Transfers    Transfers sit-stand transfer;stand-sit transfer;bed-chair transfer  -TB     Bed-Chair [place] (Transfers) contact guard;1  person assist;verbal cues;1 person to manage equipment  -     Assistive Device (Bed-Chair Transfers) walker, front-wheeled  -     Sit-Stand Floyd (Transfers) contact guard;1 person assist;verbal cues  -TB     Stand-Sit Floyd (Transfers) contact guard;1 person assist;verbal cues  -TB     Row Name 10/05/22 0945          Sit-Stand Transfer    Assistive Device (Sit-Stand Transfers) walker, front-wheeled  -     Row Name 10/05/22 0945          Stand-Sit Transfer    Assistive Device (Stand-Sit Transfers) walker, front-wheeled  -     Row Name 10/05/22 0945          Functional Mobility    Functional Mobility- Ind. Level contact guard assist;1 person + 1 person to manage equipment;verbal cues required  -     Functional Mobility- Device walker, front-wheeled  -     Functional Mobility- Safety Issues steps too close front assistive device  cues for RW positioning and safety  -     Row Name 10/05/22 0945          Activities of Daily Living    BADL Assessment/Intervention upper body dressing;lower body dressing;feeding;grooming  -     Row Name 10/05/22 0945          Mobility    Extremity Weight-bearing Status left lower extremity;right lower extremity  -TB     Left Lower Extremity (Weight-bearing Status) weight-bearing as tolerated (WBAT);other (see comments)  s/p L foot transmet amp, don boot for all OOB activity  -TB     Right Lower Extremity (Weight-bearing Status) weight-bearing as tolerated (WBAT);other (see comments)  RLE KI AAT  -     Row Name 10/05/22 0945          Lower Body Dressing Assessment/Training    Floyd Level (Lower Body Dressing) moderate assist (50% patient effort);verbal cues;don;socks;shoes/slippers  R sock/shoe  -TB     Position (Lower Body Dressing) edge of bed sitting;supine  -TB     Comment, (Lower Body Dressing) Education reinforced for ADL retraining. Dependent to don L boot supine.  -     Row Name 10/05/22 0945          Self-Feeding Assessment/Training     [place] Level (Feeding) independent;scoop food and bring to mouth;liquids to mouth  -TB     Position (Self-Feeding) sitting up in bed  -TB     Row Name 10/05/22 0945          Upper Body Dressing Assessment/Training    [place] Level (Upper Body Dressing) don;pajama/robe;set up  -TB     Position (Upper Body Dressing) edge of bed sitting  -TB     Row Name 10/05/22 0945          Grooming Assessment/Training    [place] Level (Grooming) grooming skills;set up  -TB     Position (Grooming) supported sitting  -TB           User Key  (r) = Recorded By, (t) = Taken By, (c) = Cosigned By    Initials Name Provider Type    TB Kacie Hughes OT Occupational Therapist               Obj/Interventions     Row Name 10/05/22 0965          Motor Skills    Therapeutic Exercise --  Education reinforced for benefits of dynamic OOB activity/therapy, role of OT, and HEP to support ADL performance. Pt completes BUE HEP with set-up/SBA  -TB     Row Name 10/05/22 0936          Balance    Balance Assessment sitting dynamic balance;sit to stand dynamic balance;standing dynamic balance  -     Dynamic Sitting Balance supervision  -TB     Position, Sitting Balance unsupported;sitting in chair;sitting edge of bed  -TB     Sit to Stand Dynamic Balance 1-person assist;1 person to manage equipment;verbal cues  -TB     Dynamic Standing Balance contact guard;1-person assist;1 person to manage equipment  -TB     Position/Device Used, Standing Balance walker, front-wheeled  -TB     Balance Interventions sitting;standing;sit to stand;supported;dynamic;dynamic reaching;occupation based/functional task;UE activity with balance activity  -TB           User Key  (r) = Recorded By, (t) = Taken By, (c) = Cosigned By    Initials Name Provider Type    TB Kacie Hughes OT Occupational Therapist               Goals/Plan    No documentation.                Clinical Impression     Row Name 10/05/22 0974          Pain Assessment     Pain Intervention(s) Ambulation/increased activity;Repositioned  -TB     Additional Documentation Pain Scale: FACES Pre/Post-Treatment (Group)  -TB     Row Name 10/05/22 0951          Pain Scale: FACES Pre/Post-Treatment    Pain: FACES Scale, Pretreatment 2-->hurts little bit  -TB     Posttreatment Pain Rating 2-->hurts little bit  -TB     Pain Location - Side/Orientation Right  -TB     Pain Location generalized  -TB     Pain Location - knee  -TB     Pre/Posttreatment Pain Comment Pt tolerates dynamic EOB/OOB activity well  -TB     Row Name 10/05/22 0951          Plan of Care Review    Plan of Care Reviewed With patient  -TB     Progress improving  -TB     Outcome Evaluation Pt is motivated to work with therapy and eager to transition to rehab today. Pt presents with decreased dizziness and improved OOB BPs. Transitions to EOB sitting without assist. CGAx1+1 STS, in room ambulation, and tranfer to chair using RW. L boot donned, RLE KI AAT. Mod A LB dressing. Set-up UB ADLs. OT will d/c at this time. Pt is set to d/c to rehab today.  -TB     Row Name 10/05/22 0951          Therapy Plan Review/Discharge Plan (OT)    Anticipated Discharge Disposition (OT) inpatient rehabilitation facility  -TB     Row Name 10/05/22 0951          Vital Signs    Pre Systolic BP Rehab 122  RN cleared OT  -TB     Pre Treatment Diastolic BP 70  Sitting EOB  -TB     Intra Systolic BP Rehab 137  -TB     Intra Treatment Diastolic BP 66  Following ambulation  -TB     Pre SpO2 (%) 97  -TB     O2 Delivery Pre Treatment room air  -TB     O2 Delivery Intra Treatment room air  -TB     Post SpO2 (%) 98  -TB     O2 Delivery Post Treatment room air  -TB     Pre Patient Position Supine  -TB     Intra Patient Position Standing  -TB     Post Patient Position Sitting  -TB     Row Name 10/05/22 0951          Positioning and Restraints    Pre-Treatment Position in bed  -TB     Post Treatment Position chair  -TB     In Chair notified nsg;reclined;call light  within reach;encouraged to call for assist;exit alarm on;on mechanical lift sling;waffle cushion;legs elevated  -TB           User Key  (r) = Recorded By, (t) = Taken By, (c) = Cosigned By    Initials Name Provider Type    Kacie Hernandez OT Occupational Therapist               Outcome Measures     Row Name 10/05/22 0958          How much help from another is currently needed...    Putting on and taking off regular lower body clothing? 2  -TB     Bathing (including washing, rinsing, and drying) 2  -TB     Toileting (which includes using toilet bed pan or urinal) 2  -TB     Putting on and taking off regular upper body clothing 3  -TB     Taking care of personal grooming (such as brushing teeth) 3  -TB     Eating meals 4  -TB     AM-PAC 6 Clicks Score (OT) 16  -TB     Row Name 10/05/22 0800          How much help from another person do you currently need...    Turning from your back to your side while in flat bed without using bedrails? 3  -TS     Moving from lying on back to sitting on the side of a flat bed without bedrails? 3  -TS     Moving to and from a bed to a chair (including a wheelchair)? 3  -TS     Standing up from a chair using your arms (e.g., wheelchair, bedside chair)? 3  -TS     Climbing 3-5 steps with a railing? 1  -TS     To walk in hospital room? 2  -TS     AM-PAC 6 Clicks Score (PT) 15  -TS     Highest level of mobility 4 --> Transferred to chair/commode  -TS     Row Name 10/05/22 0997          Functional Assessment    Outcome Measure Options AM-PAC 6 Clicks Daily Activity (OT)  -TB           User Key  (r) = Recorded By, (t) = Taken By, (c) = Cosigned By    Initials Name Provider Type    Kacie Hernandez OT Occupational Therapist    TS Valerie Gutierrez, RN Registered Nurse              Occupational Therapy Education                 Title: PT OT SLP Therapies (In Progress)     Topic: Occupational Therapy (In Progress)     Point: ADL training (Done)     Description:   Instruct  learner(s) on proper safety adaptation and remediation techniques during self care or transfers.   Instruct in proper use of assistive devices.              Learning Progress Summary           Patient Acceptance, E,D,TB, VU,DU,NR by TB at 10/5/2022 0958    Acceptance, E,D,TB, VU,DU,NR by TB at 10/4/2022 1339    Acceptance, E,D, NR by JY at 10/3/2022 1027    Eager, E,TB,D, VU,NR by AR at 9/28/2022 0952    Eager, E,TB,D,H, VU,NR by AR at 9/26/2022 1033    Acceptance, E, VU by LG at 9/24/2022 0545    Acceptance, E,D, VU,NR by TB at 9/23/2022 1053   Family Eager, E,TB,D,H, VU,NR by AR at 9/26/2022 1033    Acceptance, E,D, VU,NR by TB at 9/23/2022 1053                   Point: Home exercise program (Done)     Description:   Instruct learner(s) on appropriate technique for monitoring, assisting and/or progressing therapeutic exercises/activities.              Learning Progress Summary           Patient Acceptance, E,D,TB, VU,DU,NR by TB at 10/5/2022 0958    Acceptance, E,D,TB, VU,DU,NR by TB at 10/4/2022 1339    Acceptance, E,D, NR by JY at 10/3/2022 1027    Eager, E,TB,D, VU,NR by AR at 9/28/2022 0952    Eager, E,TB,D,H, VU,NR by AR at 9/26/2022 1033    Acceptance, E, VU by LG at 9/24/2022 0545   Family Eager, E,TB,D,H, VU,NR by AR at 9/26/2022 1033                   Point: Precautions (Done)     Description:   Instruct learner(s) on prescribed precautions during self-care and functional transfers.              Learning Progress Summary           Patient Acceptance, E,D,TB, VU,DU,NR by TB at 10/5/2022 0958    Acceptance, E,D,TB, VU,DU,NR by TB at 10/4/2022 1339    Acceptance, E,D, NR by JY at 10/3/2022 1027    YANELY Junior,TB,D, VU,NR by AR at 9/28/2022 0952    YANELY Junior,BERTIN,D,H, VU,NR by AR at 9/26/2022 1033    Acceptance, E, VU by LG at 9/24/2022 0545    Acceptance, E,D, VU,NR by TB at 9/23/2022 1053   Family YANELY Junior,BERTIN,D,H, VU,NR by AR at 9/26/2022 1033    Acceptance, E,D, VU,NR by TB at 9/23/2022 1053                    Point: Body mechanics (In Progress)     Description:   Instruct learner(s) on proper positioning and spine alignment during self-care, functional mobility activities and/or exercises.              Learning Progress Summary           Patient Acceptance, E,D, NR by JY at 10/3/2022 1027    Eager, E,TB,D, VU,NR by AR at 9/28/2022 0952    Eager, E,TB,D,H, VU,NR by AR at 9/26/2022 1033    Acceptance, E, VU by LG at 9/24/2022 0545   Family Eager, E,TB,D,H, VU,NR by AR at 9/26/2022 1033                               User Key     Initials Effective Dates Name Provider Type Discipline    TB 06/16/21 -  Kacie Hughes, OT Occupational Therapist OT    AR 06/16/21 -  Tiffanie Posey, OT Occupational Therapist OT    LG 11/16/18 -  Jak De La Rosa RN Registered Nurse Nurse    ALTHEA 06/16/21 -  Jaja Suresh, OT Occupational Therapist OT              OT Recommendation and Plan  Therapy Frequency (OT): daily  Plan of Care Review  Plan of Care Reviewed With: patient  Progress: improving  Outcome Evaluation: Pt is motivated to work with therapy and eager to transition to rehab today. Pt presents with decreased dizziness and improved OOB BPs. Transitions to EOB sitting without assist. CGAx1+1 STS, in room ambulation, and tranfer to chair using RW. L boot donned, RLE KI AAT. Mod A LB dressing. Set-up UB ADLs. OT will d/c at this time. Pt is set to d/c to rehab today.  Plan of Care Reviewed With: patient  Outcome Evaluation: Pt is motivated to work with therapy and eager to transition to rehab today. Pt presents with decreased dizziness and improved OOB BPs. Transitions to EOB sitting without assist. CGAx1+1 STS, in room ambulation, and tranfer to chair using RW. L boot donned, RLE KI AAT. Mod A LB dressing. Set-up UB ADLs. OT will d/c at this time. Pt is set to d/c to rehab today.     Time Calculation:    Time Calculation- OT     Row Name 10/05/22 0858             Time Calculation- OT    OT Start Time 0858  -TB      OT  Received On 10/05/22  -TB              Timed Charges    36822 - OT Therapeutic Exercise Minutes 12  -TB      88110 - OT Self Care/Mgmt Minutes 30  -TB              Total Minutes    Timed Charges Total Minutes 42  -TB       Total Minutes 42  -TB            User Key  (r) = Recorded By, (t) = Taken By, (c) = Cosigned By    Initials Name Provider Type    TB Kacie Hughes, OT Occupational Therapist              Therapy Charges for Today     Code Description Service Date Service Provider Modifiers Qty    [id number]  OT THERAPEUTIC ACT EA 15 MIN 10/4/2022 Kacie Hughes OT GO 2    [id number]  OT THER PROC EA 15 MIN 10/5/2022 Kacie Hughes OT GO 1    [id number]  OT SELF CARE/MGMT/TRAIN EA 15 MIN 10/5/2022 Kacie Hughes OT GO 2             OT Discharge Summary  Anticipated Discharge Disposition (OT): inpatient rehabilitation facility    Kacie Hughes OT  10/5/2022

## 2022-10-05 NOTE — PLAN OF CARE
Goal Outcome Evaluation:  Plan of Care Reviewed With: patient     D/C to [facility]  Problem: Adult Inpatient Plan of Care  Goal: Plan of Care Review  Outcome: Adequate for Care Transition  Goal: Patient-Specific Goal (Individualized)  Outcome: Adequate for Care Transition  Goal: Absence of Hospital-Acquired Illness or Injury  Outcome: Adequate for Care Transition  Intervention: Identify and Manage Fall Risk  Recent Flowsheet Documentation  Taken 10/5/2022 1030 by Valerie Gutierrez RN  Safety Promotion/Fall Prevention:   activity supervised   assistive device/personal items within reach   clutter free environment maintained   fall prevention program maintained   gait belt   toileting scheduled   room organization consistent   safety round/check completed   nonskid shoes/slippers when out of bed  Taken 10/5/2022 0800 by Valerie Gutierrez RN  Safety Promotion/Fall Prevention:   activity supervised   clutter free environment maintained   assistive device/personal items within reach   fall prevention program maintained   gait belt   toileting scheduled   safety round/check completed   room organization consistent   nonskid shoes/slippers when out of bed  Intervention: Prevent Skin Injury  Recent Flowsheet Documentation  Taken 10/5/2022 1030 by Valerie Gutierrez RN  Body Position: (up in chair) other (see comments)  Skin Protection:   adhesive use limited   transparent dressing maintained   tubing/devices free from skin contact  Taken 10/5/2022 0800 by Valerie Gutierrez RN  Body Position:   weight shifting   sitting up in bed  Skin Protection:   adhesive use limited   transparent dressing maintained   tubing/devices free from skin contact   skin sealant/moisture barrier applied  Intervention: Prevent and Manage VTE (Venous Thromboembolism) Risk  Recent Flowsheet Documentation  Taken 10/5/2022 1030 by Valerie Gutierrez RN  VTE Prevention/Management:   bilateral   sequential compression devices off  Taken  10/5/2022 0800 by Valerie Gutierrez RN  VTE Prevention/Management:   left   sequential compression devices on   right   foot pump device on  Intervention: Prevent Infection  Recent Flowsheet Documentation  Taken 10/5/2022 1030 by Valerie Gutierrez RN  Infection Prevention: environmental surveillance performed  Taken 10/5/2022 0800 by Valerie Gutierrez RN  Infection Prevention: environmental surveillance performed  Goal: Optimal Comfort and Wellbeing  Outcome: Adequate for Care Transition  Intervention: Provide Person-Centered Care  Recent Flowsheet Documentation  Taken 10/5/2022 1030 by Valerie Gutierrez RN  Trust Relationship/Rapport:   care explained   choices provided  Taken 10/5/2022 0800 by Valerie Gutierrez RN  Trust Relationship/Rapport:   care explained   choices provided   questions encouraged   questions answered   reassurance provided   thoughts/feelings acknowledged  Goal: Readiness for Transition of Care  Outcome: Adequate for Care Transition     Problem: Fall Injury Risk  Goal: Absence of Fall and Fall-Related Injury  Outcome: Adequate for Care Transition  Intervention: Identify and Manage Contributors  Recent Flowsheet Documentation  Taken 10/5/2022 0800 by Valerie Gutierrez RN  Medication Review/Management: medications reviewed  Intervention: Promote Injury-Free Environment  Recent Flowsheet Documentation  Taken 10/5/2022 1030 by Valerie Gutierrez RN  Safety Promotion/Fall Prevention:   activity supervised   assistive device/personal items within reach   clutter free environment maintained   fall prevention program maintained   gait belt   toileting scheduled   room organization consistent   safety round/check completed   nonskid shoes/slippers when out of bed  Taken 10/5/2022 0800 by Valerie Gutierrez, RN  Safety Promotion/Fall Prevention:   activity supervised   clutter free environment maintained   assistive device/personal items within reach   fall prevention program maintained   gait  belt   toileting scheduled   safety round/check completed   room organization consistent   nonskid shoes/slippers when out of bed     Problem: Diabetes Comorbidity  Goal: Blood Glucose Level Within Targeted Range  Outcome: Adequate for Care Transition  Intervention: Monitor and Manage Glycemia  Recent Flowsheet Documentation  Taken 10/5/2022 0800 by Valerie Gutierrez RN  Glycemic Management: blood glucose monitored     Problem: Hypertension Comorbidity  Goal: Blood Pressure in Desired Range  Outcome: Adequate for Care Transition  Intervention: Maintain Blood Pressure Management  Recent Flowsheet Documentation  Taken 10/5/2022 0800 by Valerie Gutierrez RN  Medication Review/Management: medications reviewed     Problem: Skin Injury Risk Increased  Goal: Skin Health and Integrity  Outcome: Adequate for Care Transition  Intervention: Optimize Skin Protection  Recent Flowsheet Documentation  Taken 10/5/2022 1030 by Valerie Gutierrez RN  Pressure Reduction Techniques: frequent weight shift encouraged  Head of Bed (HOB) Positioning: HOB at 60-90 degrees  Pressure Reduction Devices: chair cushion utilized  Skin Protection:   adhesive use limited   transparent dressing maintained   tubing/devices free from skin contact  Taken 10/5/2022 0800 by Valerie Gutierrez RN  Pressure Reduction Techniques:   frequent weight shift encouraged   heels elevated off bed   positioned off wounds   weight shift assistance provided  Head of Bed (HOB) Positioning: HOB at 45 degrees  Pressure Reduction Devices:   heel offloading device utilized   positioning supports utilized   specialty bed utilized  Skin Protection:   adhesive use limited   transparent dressing maintained   tubing/devices free from skin contact   skin sealant/moisture barrier applied     Problem: Bleeding (Surgery Nonspecified)  Goal: Absence of Bleeding  Outcome: Adequate for Care Transition     Problem: Bowel Motility Impaired (Surgery Nonspecified)  Goal: Effective Bowel  Elimination  Outcome: Adequate for Care Transition     Problem: Fluid and Electrolyte Imbalance (Surgery Nonspecified)  Goal: Fluid and Electrolyte Balance  Outcome: Adequate for Care Transition     Problem: Glycemic Control Impaired (Surgery Nonspecified)  Goal: Blood Glucose Level Within Targeted Range  Outcome: Adequate for Care Transition     Problem: Infection (Surgery Nonspecified)  Goal: Absence of Infection Signs and Symptoms  Outcome: Adequate for Care Transition     Problem: Ongoing Anesthesia Effects (Surgery Nonspecified)  Goal: Anesthesia/Sedation Recovery  Outcome: Adequate for Care Transition  Intervention: Optimize Anesthesia Recovery  Recent Flowsheet Documentation  Taken 10/5/2022 1030 by Valerie Gutierrez RN  Safety Promotion/Fall Prevention:   activity supervised   assistive device/personal items within reach   clutter free environment maintained   fall prevention program maintained   gait belt   toileting scheduled   room organization consistent   safety round/check completed   nonskid shoes/slippers when out of bed  Taken 10/5/2022 0800 by Valerie Gutierrez, RN  Safety Promotion/Fall Prevention:   activity supervised   clutter free environment maintained   assistive device/personal items within reach   fall prevention program maintained   gait belt   toileting scheduled   safety round/check completed   room organization consistent   nonskid shoes/slippers when out of bed     Problem: Pain (Surgery Nonspecified)  Goal: Acceptable Pain Control  Outcome: Adequate for Care Transition  Intervention: Prevent or Manage Pain  Recent Flowsheet Documentation  Taken 10/5/2022 1030 by Valerie Gutierrez, RN  Diversional Activities: television  Taken 10/5/2022 0800 by Valerie Gutierrez, RN  Diversional Activities: television     Problem: Postoperative Nausea and Vomiting (Surgery Nonspecified)  Goal: Nausea and Vomiting Relief  Outcome: Adequate for Care Transition     Problem: Postoperative Urinary  Retention (Surgery Nonspecified)  Goal: Effective Urinary Elimination  Outcome: Adequate for Care Transition     Problem: Respiratory Compromise (Surgery Nonspecified)  Goal: Effective Oxygenation and Ventilation  Outcome: Adequate for Care Transition  Intervention: Optimize Oxygenation and Ventilation  Recent Flowsheet Documentation  Taken 10/5/2022 1030 by Valerie Gutierrez, RN  Head of Bed (HOB) Positioning: HOB at 60-90 degrees  Taken 10/5/2022 0800 by Valerie Gutierrez, RN  Head of Bed (HOB) Positioning: HOB at 45 degrees

## 2022-10-05 NOTE — PAYOR COMM NOTE
"Sarah Lechuga, JD  Utilization Management  P:[phone]  F:[phone]  Auth #[id number]    DC date 10/5. Need additional days from 9/27. AB  Melchor Perez III (57 y.o. Male)             Date of Birth   1965    Social Security Number       Address   [address] [place] [address]    Home Phone   [phone]    MRN   [id number]       S[employer]ism   [facility]    Marital Status   Single                            Admission Date   9/20/22    Admission Type   Emergency    Admitting Provider   Juliana Zamora DO    Attending Provider       Department, Room/Bed   [address], S376/1       Discharge Date   10/5/2022    Discharge Disposition   Home or Self Care    Discharge Destination                               Attending Provider: (none)   Allergies: No Known Allergies    Isolation: None   Infection: MRSA (09/21/22)   Code Status: Prior   Advance Care Planning Activity    Ht: 177.8 cm (70\")   Wt: 98 kg (216 lb)    Admission Cmt: None   Principal Problem: 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]                 Active Insurance as of 9/20/2022     Primary Coverage     Payor Plan Insurance Group Employer/Plan Group    [facility] COMMUNITY PLAN [facility] COMMUNITY PLAN [facility]     Payor Plan Address Payor Plan Phone Number Payor Plan Fax Number Effective Dates    [address]   5/1/2022 - None Entered    [facility] [address] [address]       Subscriber Name Subscriber Birth Date Member ID       MELCHOR PEREZ III 1965 [id number]                 Emergency Contacts      (Rel.) Home Phone Work Phone Mobile Phone    AMENA ORDAZ (Sister) [phone] -- [phone]               Discharge Summary      Nova Galeas PA-C at 10/05/22 0851     Attestation signed by Juliana Zamora DO at 10/05/22 1017    I have reviewed this documentation and agree.                        [facility]  DISCHARGE SUMMARY    Patient Name: Melchor PACHECO" Ronen PRUITT  : 1965  MRN: [id number]    Date of Admission: 2022  7:48 PM  Date of Discharge: 10/5/2022  Primary Care Physician: Missy Up APRN    Consults     Date and Time Order Name Status Description    2022 10:21 AM Inpatient Cardiothoracic Surgery Consult Completed     2022  8:54 AM Inpatient Infectious Diseases Consult Completed     2022  2:03 AM Inpatient Orthopedic Surgery Consult Completed         Hospital Course     Presenting Problem:   Pyogenic arthritis of right knee joint, due to unspecified organism (HCC) [M00.9]    Active Hospital Problems    Diagnosis  POA   • **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]  Yes   • Wound of right buttock [S31.819A]  Clinically Undetermined   • History of osteomyelitis [Z87.39]  Not Applicable   • MRSA bacteremia [R78.81, B95.62]  Yes   • Endocarditis of tricuspid valve [I07.9]  Yes   • Other cirrhosis of liver (HCC) [K74.69]  Yes   • Type 2 diabetes mellitus with hyperglycemia, with long-term current use of insulin (HCC) [E11.65, Z79.4]  Not Applicable   • Hypertensive disorder [I10]  Yes   • Hyperlipidemia [E78.5]  Yes   • Obesity [E66.9]  Yes      Resolved Hospital Problems    Diagnosis Date Resolved POA   • Orthostatic hypotension [I95.1] 10/05/2022 No   • Volume depletion [E86.9] 10/05/2022 No      Hospital Course:  Melchor Hardwick III is a 57 y.o.  male with PMH significant for HTN, HLD, insulin-dependent DMII, PAZ cirrhosis, chronic anemia and obesity. Recently admitted to [facility] 5/10-22 for sepsis secondary to L foot gas gangrene/osteomyelitis with associated MRSA bacteremia. He required L midfoot amputation. He was diagnosed with PAZ cirrhosis with ascites / portal hypertension during this admission. He transferred to [facility] acute rehab -22. Evaluated by PCP 9/15/22 due to R knee pain. Presented to [facility] ED 22 due to worsening R knee pain. Found to have R knee  septic arthritis with associated MRSA bacteremia and tricuspid valve endocarditis.      He underwent I&D with Dr. Bentley on 9/22/22, gross purulence noted. Both surgical and blood cultures have grown MRSA. TTE revealed a possible tricuspid valve vegetation. CT Surgery consulted for TYRESE, which did not show vegetation. CTS has signed off. Infectious Disease is following.  Planning for 6 wks of IV daptomycin with TTE in 2-4 weeks.     R knee septic arthritis  MRSA bacteremia  Tricuspid valve endocarditis  - s/p knee aspiration in ED - > 50K WBCs on micro  - s/p R knee I&D by Dr. Bentley 9/22/22 - gross purulence encountered intraoperatively. Cultures (+) MRSA  - Discussed with Dr. Bentley today. Weightbearing as tolerated. Does not need knee immobilizer now that he is 2 weeks post-op. Will remove optifoam dressing and remove staples prior to DC. Follow up with Dr. Bentley in 2 weeks  - 9/20/22 blood cultures growing MRSA.   - Repeat blood cultures from 9/26/22 - NGTD   - 9/23/22 TTE concerning for 1 x 0.4cm mobile echodensity on tricuspid valve, concerning for vegetation  - 9/26/22 TTE did not show vegetation. CT surgery has evaluated and signed off  - Appreciate ID assistance. Will need 6-8 weeks of IV Daptomycin (from first negative blood culture. 6 weeks would be 9/26-11/7/22)  -  spoke with [facility] staff today, OK to DC with PIV  - Per ID, consider repeat TTE in 2-4 weeks     Orthostatic hypotension  - Per patient, new issue this admission  - Holding all BP meds  - s/p IV fluids, seems improved today. Able to walk 100 feet with minimal symptoms      Poorly-controlled DMII  - Hgb A1c 8.8%  - DC Metformin - contraindicated with cirrhosis  - Continue Levemir 25 units BID, Lispro to 20 units TID AC + SSI   - DM educator has seen     Hypokalemia  Hypomagenesemia  - Replace per protocol      Chronic anemia   Thrombocytopenia, resolved  - Iron studies consistent with iron deficiency with anemia of chronic disease  -  s/p IV iron x  IV iron daily x 4 doses. Continue PO iron supplement at [facility]  - Folate / B12 levels normal     R buttock wound  - Present on arrival, now with skin tear  - Continue routine wound care at rehab       PAZ cirrhosis  - Diagnosed in May/June 2022 during recent admission at [facility].   - 5/25/22 CT abdomen/pelvis showed advanced liver cirrhosis with changes of portal hypertension including splenomegaly, prominent portosystemic collateral vessels, small-to-moderate ascites and marked anasarca   - Placed on Nadolol, Lasix and Spironolactone   - Alk phos elevated. LFTs ok - will stop scheduled APAP at [place]   - Now issues with orthostatic hypotension  - Holding diuretics and Nadolol for now - resume as able / clinically indicated   - Outpatient GI follow up as previously planned, will place ambulatory referral to GI at [facility] for continued follow up     Recent osteomyelitis of left foot with MRSA bacteremia, s/p amputation left foot May 2022  - Was hospitalized at [facility] due to left foot osteomyelitis, initially had a washout and IV antibiotics but due to no improvement, ultimately had midfoot amputation performed by Podiatry on 5/18/22. He had MRSA bacteremia associated with this and completed 8 week course of IV Vancomycin.     Constipation, resolved  - Continue bowel regimen     Insomnia  Anxiety  - Continue Trazodone    Day of Discharge     HPI:   Orthostatic symptoms improved today - able to walk around 100 feet with therapy and had minimal symptoms. No chest pain, dyspnea, abdominal pain, nausea or vomiting. His only complaint is sore buttock wound     Review of Systems  Gen- No fevers, chills  CV- No chest pain, palpitations  Resp- No cough, dyspnea  GI- No N/V/D, abd pain    Vital Signs:   Temp:  [97.7 °F (36.5 °C)-98.6 °F (37 °C)] 98 °F (36.7 °C)  Heart Rate:  [80-99] 99  Resp:  [16-18] 16  BP: (103-147)/(58-75) 137/66    Physical Exam:  Constitutional: No acute distress, awake, alert and conversant.  Sitting up in chair   HENT: NCAT, mucous membranes moist  Respiratory: Clear to auscultation bilaterally, respiratory effort normal on room air   Cardiovascular: RRR, no murmurs, rubs, or gallops  Gastrointestinal: Positive bowel sounds, soft, nontender, nondistended  Musculoskeletal: No bilateral ankle edema. R knee incision dressed - I did not remove dressing for exam. Walking boot on LLE   Psychiatric: Appropriate affect, cooperative  Neurologic: Oriented x 3, moves all extremities spontaneously without focal deficits, speech clear and coherent   Skin: No rashes to exposed surfaces     Pertinent  and/or Most Recent Results     LAB RESULTS:      Lab 10/05/22  0806 09/29/22  1034   WBC 6.52 6.09   HEMOGLOBIN 9.0* 8.6*   HEMATOCRIT 28.2* 26.9*   PLATELETS 215 199   MCV 79.4 77.7*         Lab 10/05/22  0806 10/04/22  0414 10/02/22  1004 09/29/22  1034   SODIUM 134* 132* 131* 130*   POTASSIUM 4.4 4.5 4.5 4.6   CHLORIDE 101 102 95* 96*   CO2 22.0 20.0* 26.0 29.0   ANION GAP 11.0 10.0 10.0 5.0   BUN 10 13 15 11   CREATININE 0.50* 0.56* 0.70* 0.59*   EGFR 119.0 115.0 107.5 113.2   GLUCOSE 129* 187* 258* 232*   CALCIUM 8.2* 8.1* 8.5* 8.2*         Lab 10/05/22  0806 10/02/22  1004 09/29/22  1034   TOTAL PROTEIN 6.1 6.4 5.6*   ALBUMIN 2.70* 2.50* 2.40*   GLOBULIN 3.4 3.9 3.2   ALT (SGPT) 42* 34 34   AST (SGOT) 52* 49* 61*   BILIRUBIN 0.8 0.8 0.8   ALK PHOS 349* 320* 304*         Lab 10/02/22  1004   IRON 35*   IRON SATURATION 13*   TIBC 267*   TRANSFERRIN 179*   FERRITIN 308.80   FOLATE 16.40   VITAMIN B 12 1,032*     Brief Urine Lab Results  (Last result in the past 365 days)      Color   Clarity   Blood   Leuk Est   Nitrite   Protein   CREAT   Urine HCG        09/21/22 0141 Yellow   Cloudy   Negative   Negative   Negative   Trace               Microbiology Results (last 10 days)     Procedure Component Value - Date/Time    COVID PRE-OP / PRE-PROCEDURE SCREENING ORDER (NO ISOLATION) - Swab, Nasopharynx [[id number]]   (Normal) Collected: 10/04/22 1025    Lab Status: Final result Specimen: Swab from Nasopharynx Updated: 10/04/22 1109    Narrative:      The following orders were created for panel order COVID PRE-OP / PRE-PROCEDURE SCREENING ORDER (NO ISOLATION) - Swab, Nasopharynx.  Procedure                               Abnormality         Status                     ---------                               -----------         ------                     COVID-19, ABBOTT IN-HOUS...[[id number]]  Normal              Final result                 Please view results for these tests on the individual orders.    COVID-19, ABBOTT IN-HOUSE,NASAL Swab (NO TRANSPORT MEDIA) 2 HR TAT - Swab, Nasopharynx [[id number]]  (Normal) Collected: 10/04/22 1025    Lab Status: Final result Specimen: Swab from Nasopharynx Updated: 10/04/22 1109     COVID19 Presumptive Negative    Narrative:      Fact sheet for providers: https://www.fda.gov/media/559731/download     Fact sheet for patients: https://www.fda.gov/media/083995/download    Test performed by PCR.  If inconsistent with clinical signs and symptoms patient should be tested with different authorized molecular test.    Blood Culture - Blood, Arm, Left [[id number]]  (Normal) Collected: 09/26/22 1126    Lab Status: Final result Specimen: Blood from Arm, Left Updated: 10/01/22 1232     Blood Culture No growth at 5 days    Blood Culture - Blood, Leg, Left [[id number]]  (Normal) Collected: 09/26/22 1126    Lab Status: Final result Specimen: Blood from Leg, Left Updated: 10/01/22 1232     Blood Culture No growth at 5 days        Adult Transesophageal Echo (TYRESE) W/ Cont if Necessary Per Protocol    Addendum Date: 9/26/2022    · Normal left ventricular cavity size and wall thickness noted. All left ventricular wall segments contract normally. Estimated EF 60%. · Normal RV structure and function · Normal mitral valve with no significant stenosis or regurgitation · Normal aortic valve with no significant stenosis  or regurgitation, valve is trileaflet. · Tricuspid valve has mild regurgitation, no evidence of vegetation on this exam · Pulmonic valve is normal with no significant regurgitation seen in normal leaflet opening. · No source of intracardiac infection identified. If there remains high clinical suspicion for infective endocarditis consider repeat study in 4 to 5 days.      Result Date: 9/26/2022  · Normal left ventricular cavity size and wall thickness noted. All left ventricular wall segments contract normally. Estimated EF 60%. · Normal RV structure and function · Normal mitral valve with no significant stenosis or regurgitation · Normal aortic valve with no significant stenosis or regurgitation, valve is trileaflet. · Tricuspid valve has mild regurgitation, no evidence of vegetation on this exam · Pulmonic valve is normal with no significant regurgitation seen in normal leaflet opening. · No source of intracardiac infection identified. If there remains high clinical suspicion for infective endocarditis consider repeat study in 4 to 5 days.      Results for orders placed during the hospital encounter of 09/20/22    Adult Transesophageal Echo (TYRESE) W/ Cont if Necessary Per Protocol    Interpretation Summary  · Normal left ventricular cavity size and wall thickness noted. All left ventricular wall segments contract normally. Estimated EF 60%.  · Normal RV structure and function  · Normal mitral valve with no significant stenosis or regurgitation  · Normal aortic valve with no significant stenosis or regurgitation, valve is trileaflet.  · Tricuspid valve has mild regurgitation, no evidence of vegetation on this exam  · Pulmonic valve is normal with no significant regurgitation seen in normal leaflet opening.  · No source of intracardiac infection identified. If there remains high clinical suspicion for infective endocarditis consider repeat study in 4 to 5 days.    Discharge Details        Discharge Medications       New Medications      Instructions Start Date   acetaminophen 500 MG tablet  Commonly known as: TYLENOL   1,000 mg, Oral, 3 Times Daily PRN      aspirin 81 MG EC tablet   81 mg, Oral, Every 12 Hours Scheduled      DAPTOmycin 650 mg in sodium chloride 0.9 % 50 mL   8 mg/kg (650 mg), Intravenous, Every 24 Hours      ferrous sulfate 324 (65 Fe) MG tablet delayed-release EC tablet   324 mg, Oral, Daily With Breakfast      HYDROcodone-acetaminophen 5-325 MG per tablet  Commonly known as: NORCO   1 tablet, Oral, Every 6 Hours PRN      ondansetron 4 MG tablet  Commonly known as: ZOFRAN   4 mg, Oral, Every 6 Hours PRN      polyethylene glycol 17 g packet  Commonly known as: MIRALAX   17 g, Oral, Daily PRN      sennosides-docusate 8.6-50 MG per tablet  Commonly known as: PERICOLACE   2 tablets, Oral, 2 Times Daily      traZODone 50 MG tablet  Commonly known as: DESYREL   50 mg, Oral, Nightly      vitamin C 500 MG tablet  Commonly known as: ASCORBIC ACID   500 mg, Oral, Daily With Breakfast         Changes to Medications      Instructions Start Date   Insulin Aspart 100 UNIT/ML injection  Commonly known as: novoLOG  What changed: how much to take   20 Units, Subcutaneous, 3 Times Daily Before Meals      Insulin Glargine 100 UNIT/ML injection pen  Commonly known as: LANTUS SOLOSTAR  What changed: how much to take   25 Units, Subcutaneous, 2 Times Daily         Continue These Medications      Instructions Start Date   multivitamin tablet tablet   1 tablet, Oral, Daily      Pen Needles 31G X 5 MM misc   1 each, Subcutaneous, 2 Times Daily, Formulary Compliance Approval         Stop These Medications    furosemide 40 MG tablet  Commonly known as: LASIX     metFORMIN 500 MG tablet  Commonly known as: GLUCOPHAGE     nadolol 20 MG tablet  Commonly known as: CORGARD     nystatin 384953 UNIT/GM powder  Commonly known as: MYCOSTATIN     spironolactone 50 MG tablet  Commonly known as: ALDACTONE          No Known Allergies    Discharge  Disposition:  Home or Self Care    Diet:  Diet Order   Procedures   • Diet Regular     Activity: Weightbearing as tolerated on RLE     CODE STATUS:    Code Status and Medical Interventions:   Ordered at: 09/21/22 8725     Level Of Support Discussed With:    Patient     Code Status (Patient has no pulse and is not breathing):    CPR (Attempt to Resuscitate)     Medical Interventions (Patient has pulse or is breathing):    Full     No future appointments.    Additional Instructions for the Follow-ups that You Need to Schedule     Discharge Follow-up with Specialty: Ania THOMAS in 1 month   As directed      Specialty: Ania THOMAS in 1 month             Nova Galeas PA-C  10/05/22    Time Spent on Discharge:  I spent 45 minutes on this discharge activity which included: face-to-face encounter with the patient, reviewing the data in the system, coordination of the care with the nursing staff as well as consultants, documentation, and entering orders.                Electronically signed by Juliana Zamora DO at 10/05/22 1018

## 2022-10-05 NOTE — PROGRESS NOTES
[facility] Progress Note        Antibiotics:  Anti-Infectives (From admission, onward)    Ordered     Dose/Rate Route Frequency Start Stop    09/25/22 0753  DAPTOmycin (CUBICIN) 650 mg in sodium chloride 0.9 % 50 mL IVPB        Ordering Provider: Bryce Euceda MD    8 mg/kg × 83 kg (Adjusted)  100 mL/hr over 30 Minutes Intravenous Every 24 Hours 09/25/22 0900 11/06/22 0859    09/21/22 1358  ceFAZolin in dextrose (ANCEF) IVPB solution 2 g        Ordering Provider: Brain Bentley MD    2 g  over 30 Minutes Intravenous Once 09/21/22 1445 09/21/22 1512    09/21/22 0204  vancomycin 2000 mg/500 mL 0.9% NS IVPB ([facility])        Ordering Provider: Ronen Payan DO    2,000 mg  over 120 Minutes Intravenous Once 09/21/22 0300 09/21/22 0554          CC: fatigue    HPI:      Patient is a 57 y.o.  Yr old male with history cirrhosis/diabetes and other comorbidity as outlined below.  History of left foot gas gangrene with osteomyelitis and MRSA bacteremia treated in [place] by Dr. Giordano in May/June 2022.  Family reports left transmetatarsal amputation in approximately 8 weeks IV vancomycin.  Notes from [place] indicate transthoracic echocardiogram no vegetation per Dr. Giordano; he thinks he might of had several weeks of oral antibiotic after that but not entirely clear.  He is admitted to [facility] September 20 with progressive right knee pain occurring over the past week preceding admission.  He thinks he might of twisted it but ended up with progressive redness/swelling and pain.  No specific blunt force or penetrating trauma.  No animal insect arthropod bite.  No open wounds or active drainage.  No prior history of VRE C. difficile or ESBL/K PC/CRE organisms; he was evaluated by orthopedics and taken to the operating room for right knee incision/drainage and concern for septic arthritis per Dr. Bentley; blood cultures have shown gram-positive cocci and initial PCR data suggesting MRSA.  Had dysuria but urinalysis  not consistent with UTI.  No hematuria or pyuria    9/23 with TV echodensity     9/26/22  TYRESE no vegetation per cardiology    10/5/22   Case management making arrangements;  Doing okay overall. no oxygen;  Per nursing,  no new focal pain or distress; postop Right knee pain is  dull at present,  Better controlled per nursing overall,   nonradiating, worse with movement, better with pain meds and 2  out of 10 in severity at present; he denies any other focal musculoskeletal pain.  No back pain. No myalgia     Left foot with no redness/swelling or pain.  Surgical site healed with no open wound or active drainage.     No headache photophobia or neck stiffness.  No nausea vomiting diarrhea or abdominal pain.  No shortness of breath cough or hemoptysis.  No other new skin rash.  No other recent procedures or interventions.  No indwelling prosthetic material otherwise.  No indwelling pacemaker chronic lines       ROS:      10/5/22 per nursing; No f/c/s. No n/v/d. No rash. No new ADR to Abx.       Constitutional-- No Fever, chills or sweats.  Appetite diminished with fatigue.  Heent-- No new vision, hearing or throat complaints.  No epistaxis or oral sores.  Denies odynophagia or dysphagia.  No flashers, floaters or eye pain.   No headache, photophobia or neck stiffness.  CV-- No chest pain, palpitation or syncope  Resp-- No SOB/cough/Hemoptysis  GI- No nausea, vomiting, or diarrhea.  No hematochezia, melena, or hematemesis. Denies jaundice  -- No dysuria, hematuria, or flank pain.  Denies hesitancy, urgency or flank pain.  Lymph- no swollen lymph nodes in neck/axilla or groin.   Heme- No active bruising or bleeding; no Hx of DVT or PE.  MS-- no swelling or pain in the bones or joints of arms/legs.  No new back pain.  Neuro-- No acute focal weakness or numbness in the arms or legs.  No seizures.     Full 12 point review of systems reviewed and negative otherwise for acute complaints, except for above       PE:   /59  "(BP Location: Left arm, Patient Position: Standing)   Pulse 81   Temp 98 °F (36.7 °C) (Oral)   Resp 16   Ht 177.8 cm (70\")   Wt 98 kg (216 lb)   SpO2 98%   BMI 30.99 kg/m²       GENERAL: awake, in no acute distress.  Chronically ill-appearing  HEENT: Normocephalic, atraumatic.   No conjunctival injection. No icterus. Oropharynx clear without evidence of thrush or exudate. No evidence of peridontal disease.    NECK: Supple without nuchal rigidity. No mass.   HEART: RRR; soft murmur LSB, rubs, gallops.   LUNGS: Diminished at bases but otherwise clear to auscultation bilaterally without wheezing, rales, rhonchi. Normal respiratory effort. Nonlabored. No dullness.  ABDOMEN: Soft, nontender, nondistended. Positive bowel sounds. No rebound or guarding. NO mass or HSM.  EXT:  No cyanosis, clubbing or edema. No cord.   MSK: FROM without joint effusions noted arms/legs.    SKIN: Warm and dry without cutaneous eruptions on Inspection/palpation.    NEURO: awake     Left foot with no redness, induration or warmth.  No discrete mass bulge or fluctuance.  No crepitus or bulla.  Prior surgical site healed at [facility].  No open wound or active drainage; no tenderness     Right knee postop surgical noted.  no new redness induration or warmth.  No discrete mass bulge or fluctuance.  No crepitus or bulla.     No peripheral stigmata/phenomena of endocarditis     IV without obvious redness or drainage    Laboratory Data    Results from last 7 days   Lab Units 10/05/22  0806 09/29/22  1034   WBC 10*3/mm3 6.52 6.09   HEMOGLOBIN g/dL 9.0* 8.6*   HEMATOCRIT % 28.2* 26.9*   PLATELETS 10*3/mm3 215 199     Results from last 7 days   Lab Units 10/05/22  0806   SODIUM mmol/L 134*   POTASSIUM mmol/L 4.4   CHLORIDE mmol/L 101   CO2 mmol/L 22.0   BUN mg/dL 10   CREATININE mg/dL 0.50*   GLUCOSE mg/dL 129*   CALCIUM mg/dL 8.2*     Results from last 7 days   Lab Units 10/05/22  0806   ALK PHOS U/L 349*   BILIRUBIN mg/dL 0.8   ALT (SGPT) U/L 42* "   AST (SGOT) U/L 52*               Estimated Creatinine Clearance: 191.4 mL/min (A) (by C-G formula based on SCr of 0.5 mg/dL (L)).      Microbiology:      Radiology:  Imaging Results (Last 72 Hours)     ** No results found for the last 72 hours. **            Impression:         --Acute MRSA bacteremia/septicemia, recurrent with prior history positive cultures May 2022 and recurrent despite prior 8 weeks of IV vancomycin and left foot surgery in addition to other supportive measures.   TTE with ? TV echodensity;   IV daptomycin 8 mg/kg initiated with pharmacy assisting with dosing.  High risk for further serious morbidity and other serious sequela including persistent/progressive or recurrent infection, metastatic foci of involvement and other dire consequences. TYRESE NO VEGETATION per cardiology; at risk for occult endovascular focus but unable to confirm so far.  Nonetheless, given circumstances he will receive ongoing treatment empirically for potential occult endovascular focus     --Acute right knee pain/septic arthritis with surgical intervention, incision/debridement by Dr. Bentley on September 21.   MRSA+ in the setting of MRSA bacteremia.     --History of cirrhosis by records.  Unclear etiology.   further GI referral/eval per  medicine team     --Diabetes.  You need to tightly control blood sugar to give best chance for healing.;  Described as uncontrolled by medicine team at admission    --TCP better     PLAN:       -- IV daptomycin likely at least 6 weeks from negative blood cultures 9/26 , potentially step down to oral abx after that depending on clinical course     -- Check/review labs cultures and scans     -- Partial history per nursing staff       -- Highly complex set of issues with high risk for further serious morbidity and other serious sequela    --TYRESE noted; you should consider repeat TTE 2-4 weeks to evaluate for any evolving change;  Sooner if clinically worse    --anticipate rehab    --case  management looking into options       Bryce Euceda MD  10/5/2022

## 2022-10-05 NOTE — CASE MANAGEMENT/SOCIAL WORK
Case Management Discharge Note      Final Note: Mr. Hardwick has been approved to go to [facility]. He will be transported there via [employer] [employer] at 1100 today. I have updated Mr. Hardwick and his sister and they are in agreement with this plan. Nurse to call report to [phone] ext [phone].         Selected Continued Care - Admitted Since 9/20/2022     Destination Coordination complete.    Service Provider Selected Services Address Phone Fax Patient Preferred    [facility] [address] [address] [phone] --          Durable Medical Equipment    No services have been selected for the patient.              Dialysis/Infusion    No services have been selected for the patient.              Home Medical Care    No services have been selected for the patient.              Therapy    No services have been selected for the patient.              Community Resources    No services have been selected for the patient.              Community & DME    No services have been selected for the patient.                  Transportation Services  W/C Van: Other ([employer])    Final Discharge Disposition Code: 63 - LTCH

## 2022-10-05 NOTE — PLAN OF CARE
Goal Outcome Evaluation:  Plan of Care Reviewed With: patient      Pt. Rested well during shift.  No acute changes noted.  VSS. Will continue to monitor.

## 2022-10-05 NOTE — PLAN OF CARE
Problem: Adult Inpatient Plan of Care  Goal: Plan of Care Review  Recent Flowsheet Documentation  Taken 10/5/2022 0951 by Kacie Hughes OT  Progress: improving  Plan of Care Reviewed With: patient  Outcome Evaluation: Pt is motivated to work with therapy and eager to transition to rehab today. Pt presents with decreased dizziness and improved OOB BPs. Pt transitions to EOB sitting without assist. CGAx1+1 STS, in room ambulation, and tranfer to chair using RWLEYLA Ford AAT. Mod A LB dressing. Set-up UB ADLs. OT will d/c at this time. Pt is set to d/c to rehab today.

## 2022-10-05 NOTE — DISCHARGE PLACEMENT REQUEST
"Melchor Perez III (57 y.o. Male)             Date of Birth   1965    Social Security Number       Address   [address] [address]    Home Phone   [phone]    MRN   [id number]       [facility]   [facility]    Marital Status   Single                            Admission Date   9/20/22    Admission Type   Emergency    Admitting Provider   Juliana Zamora DO    Attending Provider   Juliana Zamora DO    Department, Room/Bed   [facility] 3H, S376/1       Discharge Date       Discharge Disposition       Discharge Destination                               Attending Provider: Juliana Zamora DO    Allergies: No Known Allergies    Isolation: None   Infection: MRSA (09/21/22)   Code Status: CPR   Advance Care Planning Activity    Ht: 177.8 cm (70\")   Wt: 98 kg (216 lb)    Admission Cmt: None   Principal Problem: 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]                 Active Insurance as of 9/20/2022     Primary Coverage     Payor Plan Insurance Group Employer/Plan Group    [facility] COMMUNITY PLAN [facility] COMMUNITY PLAN [facility]     Payor Plan Address Payor Plan Phone Number Payor Plan Fax Number Effective Dates    [address]   5/1/2022 - None Entered    [facility] [address]       Subscriber Name Subscriber Birth Date Member ID       MELCHOR PEREZ III 1965 [id number]                 Emergency Contacts      (Rel.) Home Phone Work Phone Mobile Phone    AMENA ORDAZ (Sister) [phone] -- [phone]              Facility-Administered Medications as of 10/5/2022   Medication Dose Route Frequency Provider Last Rate Last Admin   • acetaminophen (TYLENOL) tablet 650 mg  650 mg Oral Q4H PRN Brain Bentley MD   650 mg at 10/03/22 2106    Or   • acetaminophen (TYLENOL) 160 MG/5ML solution 650 mg  650 mg Oral Q4H PRN Brain Bentley MD        Or   • acetaminophen (TYLENOL) suppository 650 mg  650 mg Rectal Q4H PRN Brain Bentley MD       • " Patient arrived for appointment and was screened upon entering the facility.    acetaminophen (TYLENOL) tablet 1,000 mg  1,000 mg Oral Q8H Brain Bentley MD   1,000 mg at 10/05/22 0409   • aspirin EC tablet 81 mg  81 mg Oral Q12H Brain Bentley MD   81 mg at 10/05/22 0753   • sennosides-docusate (PERICOLACE) 8.6-50 MG per tablet 2 tablet  2 tablet Oral BID Cristy Farfan MD   2 tablet at 10/04/22 0804    And   • polyethylene glycol (MIRALAX) packet 17 g  17 g Oral Daily PRN Cristy Farfan MD   17 g at 22 0806    And   • bisacodyl (DULCOLAX) EC tablet 5 mg  5 mg Oral Daily PRN Cristy Farfan MD   5 mg at 22 1612    And   • bisacodyl (DULCOLAX) suppository 10 mg  10 mg Rectal Daily PRN Cristy Farfan MD   10 mg at 22 0926   • [] bupivacaine (PF) (MARCAINE) 0.25 % injection  - ADS Override Pull            • [COMPLETED] ceFAZolin in dextrose (ANCEF) IVPB solution 2 g  2 g Intravenous Once Brain Bentley MD   2 g at 22 1512   • DAPTOmycin (CUBICIN) 650 mg in sodium chloride 0.9 % 50 mL IVPB  8 mg/kg (Adjusted) Intravenous Q24H Bryce Euceda  mL/hr at 10/05/22 0800 650 mg at 10/05/22 0800   • dextrose (D50W) (25 g/50 mL) IV injection 25 g  25 g Intravenous Q15 Min PRN Brain Bentley MD       • dextrose (GLUTOSE) oral gel 15 g  15 g Oral Q15 Min PRN Brain Bentley MD       • diphenhydrAMINE (BENADRYL) capsule 25 mg  25 mg Oral Q6H PRN Brain Bentley MD   25 mg at 22    Or   • diphenhydrAMINE (BENADRYL) injection 25 mg  25 mg Intravenous Q6H PRN Brain Bentley MD       • [COMPLETED] fentaNYL citrate (PF) (SUBLIMAZE) injection    Code / Trauma / Sedation Medication Renny Tan MD   25 mcg at 22 1510   • ferric gluconate (FERRLECIT) 250 mg in sodium chloride 0.9 % 120 mL IVPB  250 mg Intravenous Daily Nova Galeas, SPEEDY 60 mL/hr at 10/05/22 08 250 mg at 10/05/22 0851   • glucagon (human recombinant) (GLUCAGEN DIAGNOSTIC) injection 1 mg  1 mg Intramuscular Q15 Min Brain Armstrong MD       • []  HYDROmorphone (DILAUDID) injection 0.5 mg  0.5 mg Intravenous Q2H PRN Brain Bentley MD        And   • naloxone (NARCAN) injection 0.1 mg  0.1 mg Intravenous Q5 Min PRN Brain Bentley MD       • [COMPLETED] insulin detemir (LEVEMIR) injection 10 Units  10 Units Subcutaneous Once Ronen Payan    10 Units at 22 0211   • insulin detemir (LEVEMIR) injection 25 Units  25 Units Subcutaneous Q12H Nova Galeas PA-C   25 Units at 10/05/22 0812   • Insulin Lispro (humaLOG) injection 0-9 Units  0-9 Units Subcutaneous TID AC Brain Bentley MD   2 Units at 10/04/22 1714   • Insulin Lispro (humaLOG) injection 20 Units  20 Units Subcutaneous TID With Meals Nova Galeas PA-C   20 Units at 10/05/22 0812   • [] ketorolac (TORADOL) injection 15 mg  15 mg Intravenous Q6H PRN Brain Bentley MD   15 mg at 22 1843   • [COMPLETED] lactated ringers bolus 500 mL  500 mL Intravenous Once Lisa Morris  mL/hr at 10/01/22 1427 500 mL at 10/01/22 1427   • [COMPLETED] lactated ringers infusion  9 mL/hr Intravenous Once Brian Boswell Jr., MD 9 mL/hr at 22 1436 9 mL/hr at 22 1436   • [COMPLETED] lidocaine-EPINEPHrine (XYLOCAINE W/EPI) 1 %-1:150807 injection 10 mL  10 mL Injection Once Oscar Mayer PA   10 mL at 22 2335   • Magnesium Sulfate 2 gram Bolus, followed by 8 gram infusion (total Mg dose 10 grams)- Mg less than or equal to 1mg/dL  2 g Intravenous PRN Cristy Farfan MD        Or   • Magnesium Sulfate 2 gram / 50mL Infusion (GIVE X 3 BAGS TO EQUAL 6GM TOTAL DOSE) - Mg 1.1 - 1.5 mg/dl  2 g Intravenous PRN Cristy Farfan MD        Or   • Magnesium Sulfate 4 gram infusion- Mg 1.6-1.9 mg/dL  4 g Intravenous PRN Cristy Farfan MD 25 mL/hr at 22 4 g at 22   • [COMPLETED] magnesium sulfate 4g/100mL (PREMIX) infusion  4 g Intravenous Once Ronen Payan DO   4 g at 22 9525   • melatonin tablet 5 mg  5 mg Oral Nightly  Nova Galeas PA-C       • [COMPLETED] midazolam (VERSED) injection    Code / Trauma / Sedation Medication Renny Tan MD   1 mg at 22 1510   • [COMPLETED] morphine injection 2 mg  2 mg Intravenous Once Gurdeep Allen DO   2 mg at 22 2249   • [] morphine injection 2 mg  2 mg Intravenous Q3H PRN Brain Bentley MD   2 mg at 22 1221   • multivitamin (THERAGRAN) tablet 1 tablet  1 tablet Oral Daily Brain Bentley MD   1 tablet at 10/05/22 0753   • naloxone (NARCAN) injection 0.4 mg  0.4 mg Intravenous PRN Brain Bentley MD       • [COMPLETED] ondansetron (ZOFRAN) injection 4 mg  4 mg Intravenous Once Oscar Mayer PA   4 mg at 22 0216   • ondansetron (ZOFRAN) tablet 4 mg  4 mg Oral Q6H PRN Brain Bentley MD        Or   • ondansetron (ZOFRAN) injection 4 mg  4 mg Intravenous Q6H PRN Brain Bentley MD   4 mg at 22 1259   • [] oxyCODONE (ROXICODONE) immediate release tablet 10 mg  10 mg Oral Q4H PRN Brain Bentley MD   10 mg at 22 1047   • [] oxyCODONE (ROXICODONE) immediate release tablet 5 mg  5 mg Oral Q4H PRN Brain Bentley MD   5 mg at 10/01/22 1510   • oxyCODONE (ROXICODONE) immediate release tablet 7.5 mg  7.5 mg Oral Q4H PRN Lisa Morris MD   7.5 mg at 10/04/22 0033   • potassium chloride (MICRO-K) CR capsule 40 mEq  40 mEq Oral PRN Cristy Farfan MD   40 mEq at 22 1750    Or   • potassium chloride (KLOR-CON) packet 40 mEq  40 mEq Oral PRN Cristy Farfan MD        Or   • potassium chloride 10 mEq in 100 mL IVPB  10 mEq Intravenous Q1H PRN Cristy Farfan MD       • potassium chloride (MICRO-K) CR capsule 20 mEq  20 mEq Oral Daily Nova Galeas PA-C   20 mEq at 10/05/22 0752   • [COMPLETED] potassium chloride 10 mEq in 100 mL IVPB  10 mEq Intravenous Q1H Ronen Payan  mL/hr at 22 0354 10 mEq at 22 0354   • [COMPLETED] sodium chloride 0.9 % bolus 1,000 mL  1,000 mL Intravenous Once  Oscar Mayer PA   Stopped at 22 0200   • [COMPLETED] sodium chloride 0.9 % bolus 1,000 mL  1,000 mL Intravenous Once Ronen Payan DO 2,000 mL/hr at 22 0239 1,000 mL at 22 0239   • sodium chloride 0.9 % flush 10 mL  10 mL Intravenous Q12H Brain Bentley MD   10 mL at 10/05/22 0752   • sodium chloride 0.9 % flush 10 mL  10 mL Intravenous PRN Brain Bentley MD       • [] sodium chloride 0.9 % infusion  100 mL/hr Intravenous Continuous Ronen Payan  mL/hr at 22 0352 100 mL/hr at 22 0352   • [] sodium chloride 0.9 % infusion  125 mL/hr Intravenous Continuous Nova Galeas PA-C 125 mL/hr at 10/04/22 0225 125 mL/hr at 10/04/22 0225   • sodium chloride 0.9 % infusion  75 mL/hr Intravenous Continuous Nova Galeas PA-C 75 mL/hr at 10/04/22 1145 75 mL/hr at 10/04/22 1145   • [] traMADol (ULTRAM) tablet 50 mg  50 mg Oral Q8H PRN Brain Bentley MD   50 mg at 22 1612   • [COMPLETED] tranexamic acid 1000 mg in 100 mL 0.7% NaCl infusion (premix)  1,000 mg Intravenous Once Brain Bentley MD   1,000 mg at 22 1521   • [COMPLETED] tranexamic acid 1000 mg in 100 mL 0.7% NaCl infusion (premix)  1,000 mg Intravenous Once Brain Bentley MD   1,000 mg at 22 1548   • [COMPLETED] vancomycin 2000 mg/500 mL 0.9% NS IVPB ([facility])  2,000 mg Intravenous Once Ronen Payan DO   2,000 mg at 22 0354

## 2022-10-05 NOTE — DISCHARGE SUMMARY
[facility] Medicine Services  DISCHARGE SUMMARY    Patient Name: Melchor Hardwick III  : 1965  MRN: [id number]    Date of Admission: 2022  7:48 PM  Date of Discharge: 10/5/2022  Primary Care Physician: Missy Up APRN    Consults     Date and Time Order Name Status Description    2022 10:21 AM Inpatient Cardiothoracic Surgery Consult Completed     2022  8:54 AM Inpatient Infectious Diseases Consult Completed     2022  2:03 AM Inpatient Orthopedic Surgery Consult Completed         Hospital Course     Presenting Problem:   Pyogenic arthritis of right knee joint, due to unspecified organism (HCC) [M00.9]    Active Hospital Problems    Diagnosis  POA   • **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]  Yes   • Wound of right buttock [S31.819A]  Clinically Undetermined   • History of osteomyelitis [Z87.39]  Not Applicable   • MRSA bacteremia [R78.81, B95.62]  Yes   • Endocarditis of tricuspid valve [I07.9]  Yes   • Other cirrhosis of liver (HCC) [K74.69]  Yes   • Type 2 diabetes mellitus with hyperglycemia, with long-term current use of insulin (HCC) [E11.65, Z79.4]  Not Applicable   • Hypertensive disorder [I10]  Yes   • Hyperlipidemia [E78.5]  Yes   • Obesity [E66.9]  Yes      Resolved Hospital Problems    Diagnosis Date Resolved POA   • Orthostatic hypotension [I95.1] 10/05/2022 No   • Volume depletion [E86.9] 10/05/2022 No      Hospital Course:  Melchor Hardwick III is a 57 y.o. male with PMH significant for HTN, HLD, insulin-dependent DMII, PAZ cirrhosis, chronic anemia and obesity. Recently admitted to [facility] 5/10-22 for sepsis secondary to L foot gas gangrene/osteomyelitis with associated MRSA bacteremia. He required L midfoot amputation. He was diagnosed with PAZ cirrhosis with ascites / portal hypertension during this admission. He transferred to [facility] acute rehab -22. Evaluated by PCP 9/15/22 due to R knee pain.  Presented to [facility] ED 9/21/22 due to worsening R knee pain. Found to have R knee septic arthritis with associated MRSA bacteremia and tricuspid valve endocarditis.      He underwent I&D with Dr. Bentley on 9/22/22, gross purulence noted. Both surgical and blood cultures have grown MRSA. TTE revealed a possible tricuspid valve vegetation. CT Surgery consulted for TYRESE, which did not show vegetation. CTS has signed off. Infectious Disease is following.  Planning for 6 wks of IV daptomycin with TTE in 2-4 weeks.     R knee septic arthritis  MRSA bacteremia  Tricuspid valve endocarditis  - s/p knee aspiration in ED - > 50K WBCs on micro  - s/p R knee I&D by Dr. Bentley 9/22/22 - gross purulence encountered intraoperatively. Cultures (+) MRSA  - Discussed with Dr. Bentley today. Weightbearing as tolerated. Does not need knee immobilizer now that he is 2 weeks post-op. Will remove optifoam dressing and remove staples prior to DC. Follow up with Dr. Bentley in 2 weeks  - 9/20/22 blood cultures growing MRSA.   - Repeat blood cultures from 9/26/22 - NGTD   - 9/23/22 TTE concerning for 1 x 0.4cm mobile echodensity on tricuspid valve, concerning for vegetation  - 9/26/22 TTE did not show vegetation. CT surgery has evaluated and signed off  - Appreciate ID assistance. Will need 6-8 weeks of IV Daptomycin (from first negative blood culture. 6 weeks would be 9/26-11/7/22)  -  spoke with [facility] staff today, OK to DC with PIV  - Per ID, consider repeat TTE in 2-4 weeks     Orthostatic hypotension  - Per patient, new issue this admission  - Holding all BP meds  - s/p IV fluids, seems improved today. Able to walk 100 feet with minimal symptoms      Poorly-controlled DMII  - Hgb A1c 8.8%  - DC Metformin - contraindicated with cirrhosis  - Continue Levemir 25 units BID, Lispro to 20 units TID AC + SSI   - DM educator has seen     Hypokalemia  Hypomagenesemia  - Replace per protocol      Chronic anemia    Thrombocytopenia, resolved  - Iron studies consistent with iron deficiency with anemia of chronic disease  - s/p IV iron x  IV iron daily x 4 doses. Continue PO iron supplement at [facility]  - Folate / B12 levels normal     R buttock wound  - Present on arrival, now with skin tear  - Continue routine wound care at rehab       PAZ cirrhosis  - Diagnosed in May/June 2022 during recent admission at [facility].   - 5/25/22 CT abdomen/pelvis showed advanced liver cirrhosis with changes of portal hypertension including splenomegaly, prominent portosystemic collateral vessels, small-to-moderate ascites and marked anasarca   - Placed on Nadolol, Lasix and Spironolactone   - Alk phos elevated. LFTs ok - will stop scheduled APAP at [facility]   - Now issues with orthostatic hypotension  - Holding diuretics and Nadolol for now - resume as able / clinically indicated   - Outpatient GI follow up as previously planned, will place ambulatory referral to GI at [facility] for continued follow up     Recent osteomyelitis of left foot with MRSA bacteremia, s/p amputation left foot May 2022  - Was hospitalized at [facility] due to left foot osteomyelitis, initially had a washout and IV antibiotics but due to no improvement, ultimately had midfoot amputation performed by Podiatry on 5/18/22. He had MRSA bacteremia associated with this and completed 8 week course of IV Vancomycin.     Constipation, resolved  - Continue bowel regimen     Insomnia  Anxiety  - Continue Trazodone    Day of Discharge     HPI:   Orthostatic symptoms improved today - able to walk around 100 feet with therapy and had minimal symptoms. No chest pain, dyspnea, abdominal pain, nausea or vomiting. His only complaint is sore buttock wound     Review of Systems  Gen- No fevers, chills  CV- No chest pain, palpitations  Resp- No cough, dyspnea  GI- No N/V/D, abd pain    Vital Signs:   Temp:  [97.7 °F (36.5 °C)-98.6 °F (37 °C)] 98 °F (36.7 °C)  Heart Rate:  [80-99] 99  Resp:  [16-18] 16  BP:  (103-147)/(58-75) 137/66    Physical Exam:  Constitutional: No acute distress, awake, alert and conversant. Sitting up in chair   HENT: NCAT, mucous membranes moist  Respiratory: Clear to auscultation bilaterally, respiratory effort normal on room air   Cardiovascular: RRR, no murmurs, rubs, or gallops  Gastrointestinal: Positive bowel sounds, soft, nontender, nondistended  Musculoskeletal: No bilateral ankle edema. R knee incision dressed - I did not remove dressing for exam. Walking boot on LLE   Psychiatric: Appropriate affect, cooperative  Neurologic: Oriented x 3, moves all extremities spontaneously without focal deficits, speech clear and coherent   Skin: No rashes to exposed surfaces     Pertinent  and/or Most Recent Results     LAB RESULTS:      Lab 10/05/22  0806 09/29/22  1034   WBC 6.52 6.09   HEMOGLOBIN 9.0* 8.6*   HEMATOCRIT 28.2* 26.9*   PLATELETS 215 199   MCV 79.4 77.7*         Lab 10/05/22  0806 10/04/22  0414 10/02/22  1004 09/29/22  1034   SODIUM 134* 132* 131* 130*   POTASSIUM 4.4 4.5 4.5 4.6   CHLORIDE 101 102 95* 96*   CO2 22.0 20.0* 26.0 29.0   ANION GAP 11.0 10.0 10.0 5.0   BUN 10 13 15 11   CREATININE 0.50* 0.56* 0.70* 0.59*   EGFR 119.0 115.0 107.5 113.2   GLUCOSE 129* 187* 258* 232*   CALCIUM 8.2* 8.1* 8.5* 8.2*         Lab 10/05/22  0806 10/02/22  1004 09/29/22  1034   TOTAL PROTEIN 6.1 6.4 5.6*   ALBUMIN 2.70* 2.50* 2.40*   GLOBULIN 3.4 3.9 3.2   ALT (SGPT) 42* 34 34   AST (SGOT) 52* 49* 61*   BILIRUBIN 0.8 0.8 0.8   ALK PHOS 349* 320* 304*         Lab 10/02/22  1004   IRON 35*   IRON SATURATION 13*   TIBC 267*   TRANSFERRIN 179*   FERRITIN 308.80   FOLATE 16.40   VITAMIN B 12 1,032*     Brief Urine Lab Results  (Last result in the past 365 days)      Color   Clarity   Blood   Leuk Est   Nitrite   Protein   CREAT   Urine HCG        09/21/22 0141 Yellow   Cloudy   Negative   Negative   Negative   Trace               Microbiology Results (last 10 days)     Procedure Component Value -  Date/Time    COVID PRE-OP / PRE-PROCEDURE SCREENING ORDER (NO ISOLATION) - Swab, Nasopharynx [[id number]]  (Normal) Collected: 10/04/22 1025    Lab Status: Final result Specimen: Swab from Nasopharynx Updated: 10/04/22 1109    Narrative:      The following orders were created for panel order COVID PRE-OP / PRE-PROCEDURE SCREENING ORDER (NO ISOLATION) - Swab, Nasopharynx.  Procedure                               Abnormality         Status                     ---------                               -----------         ------                     COVID-19, ABBOTT IN-HOUS...[[id number]]  Normal              Final result                 Please view results for these tests on the individual orders.    COVID-19, ABBOTT IN-HOUSE,NASAL Swab (NO TRANSPORT MEDIA) 2 HR TAT - Swab, Nasopharynx [[id number]]  (Normal) Collected: 10/04/22 1025    Lab Status: Final result Specimen: Swab from Nasopharynx Updated: 10/04/22 1109     COVID19 Presumptive Negative    Narrative:      Fact sheet for providers: https://www.fda.gov/media/149339/download     Fact sheet for patients: https://www.fda.gov/media/594082/download    Test performed by PCR.  If inconsistent with clinical signs and symptoms patient should be tested with different authorized molecular test.    Blood Culture - Blood, Arm, Left [[id number]]  (Normal) Collected: 09/26/22 1126    Lab Status: Final result Specimen: Blood from Arm, Left Updated: 10/01/22 1232     Blood Culture No growth at 5 days    Blood Culture - Blood, Leg, Left [[id number]]  (Normal) Collected: 09/26/22 1126    Lab Status: Final result Specimen: Blood from Leg, Left Updated: 10/01/22 1232     Blood Culture No growth at 5 days        Adult Transesophageal Echo (TYRESE) W/ Cont if Necessary Per Protocol    Addendum Date: 9/26/2022    · Normal left ventricular cavity size and wall thickness noted. All left ventricular wall segments contract normally. Estimated EF 60%. · Normal RV structure and function · Normal  mitral valve with no significant stenosis or regurgitation · Normal aortic valve with no significant stenosis or regurgitation, valve is trileaflet. · Tricuspid valve has mild regurgitation, no evidence of vegetation on this exam · Pulmonic valve is normal with no significant regurgitation seen in normal leaflet opening. · No source of intracardiac infection identified. If there remains high clinical suspicion for infective endocarditis consider repeat study in 4 to 5 days.      Result Date: 9/26/2022  · Normal left ventricular cavity size and wall thickness noted. All left ventricular wall segments contract normally. Estimated EF 60%. · Normal RV structure and function · Normal mitral valve with no significant stenosis or regurgitation · Normal aortic valve with no significant stenosis or regurgitation, valve is trileaflet. · Tricuspid valve has mild regurgitation, no evidence of vegetation on this exam · Pulmonic valve is normal with no significant regurgitation seen in normal leaflet opening. · No source of intracardiac infection identified. If there remains high clinical suspicion for infective endocarditis consider repeat study in 4 to 5 days.      Results for orders placed during the hospital encounter of 09/20/22    Adult Transesophageal Echo (TYRESE) W/ Cont if Necessary Per Protocol    Interpretation Summary  · Normal left ventricular cavity size and wall thickness noted. All left ventricular wall segments contract normally. Estimated EF 60%.  · Normal RV structure and function  · Normal mitral valve with no significant stenosis or regurgitation  · Normal aortic valve with no significant stenosis or regurgitation, valve is trileaflet.  · Tricuspid valve has mild regurgitation, no evidence of vegetation on this exam  · Pulmonic valve is normal with no significant regurgitation seen in normal leaflet opening.  · No source of intracardiac infection identified. If there remains high clinical suspicion for  infective endocarditis consider repeat study in 4 to 5 days.    Discharge Details        Discharge Medications      New Medications      Instructions Start Date   acetaminophen 500 MG tablet  Commonly known as: TYLENOL   1,000 mg, Oral, 3 Times Daily PRN      aspirin 81 MG EC tablet   81 mg, Oral, Every 12 Hours Scheduled      DAPTOmycin 650 mg in sodium chloride 0.9 % 50 mL   8 mg/kg (650 mg), Intravenous, Every 24 Hours      ferrous sulfate 324 (65 Fe) MG tablet delayed-release EC tablet   324 mg, Oral, Daily With Breakfast      HYDROcodone-acetaminophen 5-325 MG per tablet  Commonly known as: NORCO   1 tablet, Oral, Every 6 Hours PRN      ondansetron 4 MG tablet  Commonly known as: ZOFRAN   4 mg, Oral, Every 6 Hours PRN      polyethylene glycol 17 g packet  Commonly known as: MIRALAX   17 g, Oral, Daily PRN      sennosides-docusate 8.6-50 MG per tablet  Commonly known as: PERICOLACE   2 tablets, Oral, 2 Times Daily      traZODone 50 MG tablet  Commonly known as: DESYREL   50 mg, Oral, Nightly      vitamin C 500 MG tablet  Commonly known as: ASCORBIC ACID   500 mg, Oral, Daily With Breakfast         Changes to Medications      Instructions Start Date   Insulin Aspart 100 UNIT/ML injection  Commonly known as: novoLOG  What changed: how much to take   20 Units, Subcutaneous, 3 Times Daily Before Meals      Insulin Glargine 100 UNIT/ML injection pen  Commonly known as: LANTUS SOLOSTAR  What changed: how much to take   25 Units, Subcutaneous, 2 Times Daily         Continue These Medications      Instructions Start Date   multivitamin tablet tablet   1 tablet, Oral, Daily      Pen Needles 31G X 5 MM misc   1 each, Subcutaneous, 2 Times Daily, Formulary Compliance Approval         Stop These Medications    furosemide 40 MG tablet  Commonly known as: LASIX     metFORMIN 500 MG tablet  Commonly known as: GLUCOPHAGE     nadolol 20 MG tablet  Commonly known as: CORGARD     nystatin 235592 UNIT/GM powder  Commonly known as:  MYCOSTATIN     spironolactone 50 MG tablet  Commonly known as: ALDACTONE          No Known Allergies    Discharge Disposition:  Home or Self Care    Diet:  Diet Order   Procedures   • Diet Regular     Activity: Weightbearing as tolerated on RLE     CODE STATUS:    Code Status and Medical Interventions:   Ordered at: 09/21/22 3044     Level Of Support Discussed With:    Patient     Code Status (Patient has no pulse and is not breathing):    CPR (Attempt to Resuscitate)     Medical Interventions (Patient has pulse or is breathing):    Full     No future appointments.    Additional Instructions for the Follow-ups that You Need to Schedule     Discharge Follow-up with Specialty: Ania THOMAS in 1 month   As directed      Specialty: Ania THOMAS in 1 month             Nova Galeas PA-C  10/05/22    Time Spent on Discharge:  I spent 45 minutes on this discharge activity which included: face-to-face encounter with the patient, reviewing the data in the system, coordination of the care with the nursing staff as well as consultants, documentation, and entering orders.

## 2022-10-06 ENCOUNTER — OUTSIDE FACILITY SERVICE (OUTPATIENT)
Dept: INFECTIOUS DISEASES | Facility: CLINIC | Age: 57
End: 2022-10-06

## 2022-10-06 LAB
ALBUMIN SERPL-MCNC: 2.19 G/DL (ref 3.5–5.2)
ALBUMIN/GLOB SERPL: 0.6 G/DL
ALP SERPL-CCNC: 346 U/L (ref 39–117)
ALT SERPL W P-5'-P-CCNC: 44 U/L (ref 1–41)
ANION GAP SERPL CALCULATED.3IONS-SCNC: 10.1 MMOL/L (ref 5–15)
AST SERPL-CCNC: 59 U/L (ref 1–40)
BASOPHILS # BLD AUTO: 0.03 10*3/MM3 (ref 0–0.2)
BASOPHILS NFR BLD AUTO: 0.6 % (ref 0–1.5)
BILIRUB SERPL-MCNC: 0.6 MG/DL (ref 0–1.2)
BUN SERPL-MCNC: 11 MG/DL (ref 6–20)
BUN/CREAT SERPL: 18.3 (ref 7–25)
CALCIUM SPEC-SCNC: 8.2 MG/DL (ref 8.6–10.5)
CHLORIDE SERPL-SCNC: 103 MMOL/L (ref 98–107)
CO2 SERPL-SCNC: 21.9 MMOL/L (ref 22–29)
CREAT SERPL-MCNC: 0.6 MG/DL (ref 0.76–1.27)
CRP SERPL-MCNC: 8.56 MG/DL (ref 0–0.5)
DEPRECATED RDW RBC AUTO: 55 FL (ref 37–54)
EGFRCR SERPLBLD CKD-EPI 2021: 112.6 ML/MIN/1.73
EOSINOPHIL # BLD AUTO: 0.23 10*3/MM3 (ref 0–0.4)
EOSINOPHIL NFR BLD AUTO: 4.5 % (ref 0.3–6.2)
ERYTHROCYTE [DISTWIDTH] IN BLOOD BY AUTOMATED COUNT: 19.2 % (ref 12.3–15.4)
GLOBULIN UR ELPH-MCNC: 3.8 GM/DL
GLUCOSE BLDC GLUCOMTR-MCNC: 154 MG/DL (ref 70–130)
GLUCOSE BLDC GLUCOMTR-MCNC: 273 MG/DL (ref 70–130)
GLUCOSE BLDC GLUCOMTR-MCNC: 273 MG/DL (ref 70–130)
GLUCOSE BLDC GLUCOMTR-MCNC: 344 MG/DL (ref 70–130)
GLUCOSE SERPL-MCNC: 148 MG/DL (ref 65–99)
HCT VFR BLD AUTO: 24.7 % (ref 37.5–51)
HGB BLD-MCNC: 7.6 G/DL (ref 13–17.7)
IMM GRANULOCYTES # BLD AUTO: 0.04 10*3/MM3 (ref 0–0.05)
IMM GRANULOCYTES NFR BLD AUTO: 0.8 % (ref 0–0.5)
LYMPHOCYTES # BLD AUTO: 1.13 10*3/MM3 (ref 0.7–3.1)
LYMPHOCYTES NFR BLD AUTO: 22 % (ref 19.6–45.3)
MCH RBC QN AUTO: 25.2 PG (ref 26.6–33)
MCHC RBC AUTO-ENTMCNC: 30.8 G/DL (ref 31.5–35.7)
MCV RBC AUTO: 81.8 FL (ref 79–97)
MONOCYTES # BLD AUTO: 0.53 10*3/MM3 (ref 0.1–0.9)
MONOCYTES NFR BLD AUTO: 10.3 % (ref 5–12)
NEUTROPHILS NFR BLD AUTO: 3.18 10*3/MM3 (ref 1.7–7)
NEUTROPHILS NFR BLD AUTO: 61.8 % (ref 42.7–76)
NRBC BLD AUTO-RTO: 0 /100 WBC (ref 0–0.2)
PLATELET # BLD AUTO: 157 10*3/MM3 (ref 140–450)
PMV BLD AUTO: 9.6 FL (ref 6–12)
POTASSIUM SERPL-SCNC: 4.2 MMOL/L (ref 3.5–5.2)
PREALB SERPL-MCNC: 10 MG/DL (ref 20–40)
PROT SERPL-MCNC: 6 G/DL (ref 6–8.5)
RBC # BLD AUTO: 3.02 10*6/MM3 (ref 4.14–5.8)
SODIUM SERPL-SCNC: 135 MMOL/L (ref 136–145)
WBC NRBC COR # BLD: 5.14 10*3/MM3 (ref 3.4–10.8)

## 2022-10-06 PROCEDURE — 86140 C-REACTIVE PROTEIN: CPT | Performed by: INTERNAL MEDICINE

## 2022-10-06 PROCEDURE — 86022 PLATELET ANTIBODIES: CPT | Performed by: PHYSICIAN ASSISTANT

## 2022-10-06 PROCEDURE — 80053 COMPREHEN METABOLIC PANEL: CPT | Performed by: INTERNAL MEDICINE

## 2022-10-06 PROCEDURE — 82962 GLUCOSE BLOOD TEST: CPT

## 2022-10-06 PROCEDURE — 99222 1ST HOSP IP/OBS MODERATE 55: CPT | Performed by: INTERNAL MEDICINE

## 2022-10-06 PROCEDURE — 85025 COMPLETE CBC W/AUTO DIFF WBC: CPT | Performed by: INTERNAL MEDICINE

## 2022-10-06 PROCEDURE — 97162 PT EVAL MOD COMPLEX 30 MIN: CPT

## 2022-10-06 NOTE — OUTREACH NOTE
Prep Survey    Flowsheet Row Responses   [facility] facility patient discharged from? [place]   Is LACE score < 7 ? No   Emergency Room discharge w/ pulse ox? No   Eligibility Not Eligible   What are the reasons patient is not eligible? Readmitted   Does the patient have one of the following disease processes/diagnoses(primary or secondary)? Other   Prep survey completed? Yes          TARSHA HOPE - Registered Nurse

## 2022-10-07 ENCOUNTER — OUTSIDE FACILITY SERVICE (OUTPATIENT)
Dept: INFECTIOUS DISEASES | Facility: CLINIC | Age: 57
End: 2022-10-07

## 2022-10-07 LAB
ALBUMIN SERPL-MCNC: 2.52 G/DL (ref 3.5–5.2)
ALBUMIN/GLOB SERPL: 0.7 G/DL
ALP SERPL-CCNC: 419 U/L (ref 39–117)
ALT SERPL W P-5'-P-CCNC: 57 U/L (ref 1–41)
ANION GAP SERPL CALCULATED.3IONS-SCNC: 9.7 MMOL/L (ref 5–15)
AST SERPL-CCNC: 67 U/L (ref 1–40)
BASOPHILS # BLD AUTO: 0.03 10*3/MM3 (ref 0–0.2)
BASOPHILS NFR BLD AUTO: 0.6 % (ref 0–1.5)
BILIRUB SERPL-MCNC: 0.8 MG/DL (ref 0–1.2)
BUN SERPL-MCNC: 12 MG/DL (ref 6–20)
BUN/CREAT SERPL: 20.7 (ref 7–25)
CALCIUM SPEC-SCNC: 8.3 MG/DL (ref 8.6–10.5)
CHLORIDE SERPL-SCNC: 100 MMOL/L (ref 98–107)
CO2 SERPL-SCNC: 22.3 MMOL/L (ref 22–29)
CREAT SERPL-MCNC: 0.58 MG/DL (ref 0.76–1.27)
CRP SERPL-MCNC: 8.83 MG/DL (ref 0–0.5)
DEPRECATED RDW RBC AUTO: 54.4 FL (ref 37–54)
EGFRCR SERPLBLD CKD-EPI 2021: 113.8 ML/MIN/1.73
EOSINOPHIL # BLD AUTO: 0.17 10*3/MM3 (ref 0–0.4)
EOSINOPHIL NFR BLD AUTO: 3.3 % (ref 0.3–6.2)
ERYTHROCYTE [DISTWIDTH] IN BLOOD BY AUTOMATED COUNT: 19.3 % (ref 12.3–15.4)
GLOBULIN UR ELPH-MCNC: 3.8 GM/DL
GLUCOSE BLDC GLUCOMTR-MCNC: 178 MG/DL (ref 70–130)
GLUCOSE BLDC GLUCOMTR-MCNC: 207 MG/DL (ref 70–130)
GLUCOSE BLDC GLUCOMTR-MCNC: 248 MG/DL (ref 70–130)
GLUCOSE BLDC GLUCOMTR-MCNC: 303 MG/DL (ref 70–130)
GLUCOSE SERPL-MCNC: 150 MG/DL (ref 65–99)
HCT VFR BLD AUTO: 26 % (ref 37.5–51)
HGB BLD-MCNC: 8.2 G/DL (ref 13–17.7)
IMM GRANULOCYTES # BLD AUTO: 0.03 10*3/MM3 (ref 0–0.05)
IMM GRANULOCYTES NFR BLD AUTO: 0.6 % (ref 0–0.5)
LYMPHOCYTES # BLD AUTO: 1.1 10*3/MM3 (ref 0.7–3.1)
LYMPHOCYTES NFR BLD AUTO: 21 % (ref 19.6–45.3)
MCH RBC QN AUTO: 25.3 PG (ref 26.6–33)
MCHC RBC AUTO-ENTMCNC: 31.5 G/DL (ref 31.5–35.7)
MCV RBC AUTO: 80.2 FL (ref 79–97)
MONOCYTES # BLD AUTO: 0.46 10*3/MM3 (ref 0.1–0.9)
MONOCYTES NFR BLD AUTO: 8.8 % (ref 5–12)
NEUTROPHILS NFR BLD AUTO: 3.44 10*3/MM3 (ref 1.7–7)
NEUTROPHILS NFR BLD AUTO: 65.7 % (ref 42.7–76)
NRBC BLD AUTO-RTO: 0 /100 WBC (ref 0–0.2)
PLATELET # BLD AUTO: 159 10*3/MM3 (ref 140–450)
PMV BLD AUTO: 9.3 FL (ref 6–12)
POTASSIUM SERPL-SCNC: 3.9 MMOL/L (ref 3.5–5.2)
PROT SERPL-MCNC: 6.3 G/DL (ref 6–8.5)
RBC # BLD AUTO: 3.24 10*6/MM3 (ref 4.14–5.8)
SODIUM SERPL-SCNC: 132 MMOL/L (ref 136–145)
WBC NRBC COR # BLD: 5.23 10*3/MM3 (ref 3.4–10.8)

## 2022-10-07 PROCEDURE — 82962 GLUCOSE BLOOD TEST: CPT

## 2022-10-07 PROCEDURE — 80053 COMPREHEN METABOLIC PANEL: CPT | Performed by: INTERNAL MEDICINE

## 2022-10-07 PROCEDURE — 85025 COMPLETE CBC W/AUTO DIFF WBC: CPT | Performed by: INTERNAL MEDICINE

## 2022-10-07 PROCEDURE — 99232 SBSQ HOSP IP/OBS MODERATE 35: CPT | Performed by: INTERNAL MEDICINE

## 2022-10-07 PROCEDURE — 86140 C-REACTIVE PROTEIN: CPT | Performed by: INTERNAL MEDICINE

## 2022-10-08 ENCOUNTER — OUTSIDE FACILITY SERVICE (OUTPATIENT)
Dept: INFECTIOUS DISEASES | Facility: CLINIC | Age: 57
End: 2022-10-08

## 2022-10-08 LAB
ALBUMIN SERPL-MCNC: 2.43 G/DL (ref 3.5–5.2)
ALBUMIN/GLOB SERPL: 0.7 G/DL
ALP SERPL-CCNC: 390 U/L (ref 39–117)
ALT SERPL W P-5'-P-CCNC: 48 U/L (ref 1–41)
ANION GAP SERPL CALCULATED.3IONS-SCNC: 9.9 MMOL/L (ref 5–15)
AST SERPL-CCNC: 53 U/L (ref 1–40)
BASOPHILS # BLD AUTO: 0.03 10*3/MM3 (ref 0–0.2)
BASOPHILS NFR BLD AUTO: 0.6 % (ref 0–1.5)
BILIRUB SERPL-MCNC: 0.8 MG/DL (ref 0–1.2)
BUN SERPL-MCNC: 11 MG/DL (ref 6–20)
BUN/CREAT SERPL: 22 (ref 7–25)
CALCIUM SPEC-SCNC: 8.1 MG/DL (ref 8.6–10.5)
CHLORIDE SERPL-SCNC: 98 MMOL/L (ref 98–107)
CO2 SERPL-SCNC: 22.1 MMOL/L (ref 22–29)
CREAT SERPL-MCNC: 0.5 MG/DL (ref 0.76–1.27)
CRP SERPL-MCNC: 8.22 MG/DL (ref 0–0.5)
DEPRECATED RDW RBC AUTO: 57 FL (ref 37–54)
EGFRCR SERPLBLD CKD-EPI 2021: 119 ML/MIN/1.73
EOSINOPHIL # BLD AUTO: 0.14 10*3/MM3 (ref 0–0.4)
EOSINOPHIL NFR BLD AUTO: 2.6 % (ref 0.3–6.2)
ERYTHROCYTE [DISTWIDTH] IN BLOOD BY AUTOMATED COUNT: 19.8 % (ref 12.3–15.4)
GLOBULIN UR ELPH-MCNC: 3.7 GM/DL
GLUCOSE BLDC GLUCOMTR-MCNC: 132 MG/DL (ref 70–130)
GLUCOSE BLDC GLUCOMTR-MCNC: 192 MG/DL (ref 70–130)
GLUCOSE BLDC GLUCOMTR-MCNC: 239 MG/DL (ref 70–130)
GLUCOSE BLDC GLUCOMTR-MCNC: 282 MG/DL (ref 70–130)
GLUCOSE SERPL-MCNC: 178 MG/DL (ref 65–99)
HCT VFR BLD AUTO: 26.7 % (ref 37.5–51)
HGB BLD-MCNC: 8.5 G/DL (ref 13–17.7)
IMM GRANULOCYTES # BLD AUTO: 0.03 10*3/MM3 (ref 0–0.05)
IMM GRANULOCYTES NFR BLD AUTO: 0.6 % (ref 0–0.5)
LYMPHOCYTES # BLD AUTO: 1.07 10*3/MM3 (ref 0.7–3.1)
LYMPHOCYTES NFR BLD AUTO: 20.1 % (ref 19.6–45.3)
MCH RBC QN AUTO: 25.9 PG (ref 26.6–33)
MCHC RBC AUTO-ENTMCNC: 31.8 G/DL (ref 31.5–35.7)
MCV RBC AUTO: 81.4 FL (ref 79–97)
MONOCYTES # BLD AUTO: 0.53 10*3/MM3 (ref 0.1–0.9)
MONOCYTES NFR BLD AUTO: 9.9 % (ref 5–12)
NEUTROPHILS NFR BLD AUTO: 3.53 10*3/MM3 (ref 1.7–7)
NEUTROPHILS NFR BLD AUTO: 66.2 % (ref 42.7–76)
NRBC BLD AUTO-RTO: 0 /100 WBC (ref 0–0.2)
PF4 HEPARIN CMPLX IGG SERPL IA: 0.17 OD (ref 0–0.4)
PLATELET # BLD AUTO: 138 10*3/MM3 (ref 140–450)
PMV BLD AUTO: 9.4 FL (ref 6–12)
POTASSIUM SERPL-SCNC: 4.1 MMOL/L (ref 3.5–5.2)
PROT SERPL-MCNC: 6.1 G/DL (ref 6–8.5)
RBC # BLD AUTO: 3.28 10*6/MM3 (ref 4.14–5.8)
SODIUM SERPL-SCNC: 130 MMOL/L (ref 136–145)
WBC NRBC COR # BLD: 5.33 10*3/MM3 (ref 3.4–10.8)

## 2022-10-08 PROCEDURE — 99232 SBSQ HOSP IP/OBS MODERATE 35: CPT | Performed by: PHYSICIAN ASSISTANT

## 2022-10-08 PROCEDURE — 86140 C-REACTIVE PROTEIN: CPT | Performed by: INTERNAL MEDICINE

## 2022-10-08 PROCEDURE — 85025 COMPLETE CBC W/AUTO DIFF WBC: CPT | Performed by: INTERNAL MEDICINE

## 2022-10-08 PROCEDURE — 82962 GLUCOSE BLOOD TEST: CPT

## 2022-10-08 PROCEDURE — 80053 COMPREHEN METABOLIC PANEL: CPT | Performed by: INTERNAL MEDICINE

## 2022-10-09 LAB
ALBUMIN SERPL-MCNC: 2.35 G/DL (ref 3.5–5.2)
ALBUMIN/GLOB SERPL: 0.6 G/DL
ALP SERPL-CCNC: 399 U/L (ref 39–117)
ALT SERPL W P-5'-P-CCNC: 56 U/L (ref 1–41)
ANION GAP SERPL CALCULATED.3IONS-SCNC: 9.2 MMOL/L (ref 5–15)
AST SERPL-CCNC: 69 U/L (ref 1–40)
BASOPHILS # BLD AUTO: 0.02 10*3/MM3 (ref 0–0.2)
BASOPHILS NFR BLD AUTO: 0.5 % (ref 0–1.5)
BILIRUB SERPL-MCNC: 0.7 MG/DL (ref 0–1.2)
BUN SERPL-MCNC: 9 MG/DL (ref 6–20)
BUN/CREAT SERPL: 15 (ref 7–25)
CALCIUM SPEC-SCNC: 8.3 MG/DL (ref 8.6–10.5)
CHLORIDE SERPL-SCNC: 101 MMOL/L (ref 98–107)
CO2 SERPL-SCNC: 23.8 MMOL/L (ref 22–29)
CREAT SERPL-MCNC: 0.6 MG/DL (ref 0.76–1.27)
CRP SERPL-MCNC: 7.32 MG/DL (ref 0–0.5)
DEPRECATED RDW RBC AUTO: 59.3 FL (ref 37–54)
EGFRCR SERPLBLD CKD-EPI 2021: 112.6 ML/MIN/1.73
EOSINOPHIL # BLD AUTO: 0.21 10*3/MM3 (ref 0–0.4)
EOSINOPHIL NFR BLD AUTO: 5.3 % (ref 0.3–6.2)
ERYTHROCYTE [DISTWIDTH] IN BLOOD BY AUTOMATED COUNT: 19.8 % (ref 12.3–15.4)
GLOBULIN UR ELPH-MCNC: 4 GM/DL
GLUCOSE BLDC GLUCOMTR-MCNC: 117 MG/DL (ref 70–130)
GLUCOSE BLDC GLUCOMTR-MCNC: 224 MG/DL (ref 70–130)
GLUCOSE BLDC GLUCOMTR-MCNC: 228 MG/DL (ref 70–130)
GLUCOSE BLDC GLUCOMTR-MCNC: 321 MG/DL (ref 70–130)
GLUCOSE SERPL-MCNC: 158 MG/DL (ref 65–99)
HCT VFR BLD AUTO: 27.3 % (ref 37.5–51)
HGB BLD-MCNC: 8.7 G/DL (ref 13–17.7)
IMM GRANULOCYTES # BLD AUTO: 0.01 10*3/MM3 (ref 0–0.05)
IMM GRANULOCYTES NFR BLD AUTO: 0.3 % (ref 0–0.5)
LYMPHOCYTES # BLD AUTO: 1.03 10*3/MM3 (ref 0.7–3.1)
LYMPHOCYTES NFR BLD AUTO: 25.8 % (ref 19.6–45.3)
MCH RBC QN AUTO: 26.2 PG (ref 26.6–33)
MCHC RBC AUTO-ENTMCNC: 31.9 G/DL (ref 31.5–35.7)
MCV RBC AUTO: 82.2 FL (ref 79–97)
MONOCYTES # BLD AUTO: 0.43 10*3/MM3 (ref 0.1–0.9)
MONOCYTES NFR BLD AUTO: 10.8 % (ref 5–12)
NEUTROPHILS NFR BLD AUTO: 2.3 10*3/MM3 (ref 1.7–7)
NEUTROPHILS NFR BLD AUTO: 57.3 % (ref 42.7–76)
NRBC BLD AUTO-RTO: 0 /100 WBC (ref 0–0.2)
PLATELET # BLD AUTO: 124 10*3/MM3 (ref 140–450)
PMV BLD AUTO: 9.2 FL (ref 6–12)
POTASSIUM SERPL-SCNC: 4.3 MMOL/L (ref 3.5–5.2)
PROT SERPL-MCNC: 6.3 G/DL (ref 6–8.5)
RBC # BLD AUTO: 3.32 10*6/MM3 (ref 4.14–5.8)
SODIUM SERPL-SCNC: 134 MMOL/L (ref 136–145)
WBC NRBC COR # BLD: 4 10*3/MM3 (ref 3.4–10.8)

## 2022-10-09 PROCEDURE — 82962 GLUCOSE BLOOD TEST: CPT

## 2022-10-09 PROCEDURE — 80053 COMPREHEN METABOLIC PANEL: CPT | Performed by: INTERNAL MEDICINE

## 2022-10-09 PROCEDURE — 85025 COMPLETE CBC W/AUTO DIFF WBC: CPT | Performed by: INTERNAL MEDICINE

## 2022-10-09 PROCEDURE — 86140 C-REACTIVE PROTEIN: CPT | Performed by: INTERNAL MEDICINE

## 2022-10-10 ENCOUNTER — OUTSIDE FACILITY SERVICE (OUTPATIENT)
Dept: INFECTIOUS DISEASES | Facility: CLINIC | Age: 57
End: 2022-10-10

## 2022-10-10 LAB
ALBUMIN SERPL-MCNC: 2.27 G/DL (ref 3.5–5.2)
ALBUMIN/GLOB SERPL: 0.6 G/DL
ALP SERPL-CCNC: 398 U/L (ref 39–117)
ALT SERPL W P-5'-P-CCNC: 50 U/L (ref 1–41)
ANION GAP SERPL CALCULATED.3IONS-SCNC: 8.7 MMOL/L (ref 5–15)
AST SERPL-CCNC: 60 U/L (ref 1–40)
BASOPHILS # BLD AUTO: 0.03 10*3/MM3 (ref 0–0.2)
BASOPHILS NFR BLD AUTO: 0.8 % (ref 0–1.5)
BILIRUB SERPL-MCNC: 0.6 MG/DL (ref 0–1.2)
BUN SERPL-MCNC: 12 MG/DL (ref 6–20)
BUN/CREAT SERPL: 19.7 (ref 7–25)
CALCIUM SPEC-SCNC: 7.9 MG/DL (ref 8.6–10.5)
CHLORIDE SERPL-SCNC: 98 MMOL/L (ref 98–107)
CO2 SERPL-SCNC: 23.3 MMOL/L (ref 22–29)
CREAT SERPL-MCNC: 0.61 MG/DL (ref 0.76–1.27)
CRP SERPL-MCNC: 5.88 MG/DL (ref 0–0.5)
DEPRECATED RDW RBC AUTO: 59.4 FL (ref 37–54)
EGFRCR SERPLBLD CKD-EPI 2021: 112 ML/MIN/1.73
EOSINOPHIL # BLD AUTO: 0.22 10*3/MM3 (ref 0–0.4)
EOSINOPHIL NFR BLD AUTO: 5.7 % (ref 0.3–6.2)
ERYTHROCYTE [DISTWIDTH] IN BLOOD BY AUTOMATED COUNT: 19.6 % (ref 12.3–15.4)
GLOBULIN UR ELPH-MCNC: 3.5 GM/DL
GLUCOSE BLDC GLUCOMTR-MCNC: 184 MG/DL (ref 70–130)
GLUCOSE BLDC GLUCOMTR-MCNC: 204 MG/DL (ref 70–130)
GLUCOSE BLDC GLUCOMTR-MCNC: 238 MG/DL (ref 70–130)
GLUCOSE BLDC GLUCOMTR-MCNC: 285 MG/DL (ref 70–130)
GLUCOSE SERPL-MCNC: 220 MG/DL (ref 65–99)
HCT VFR BLD AUTO: 25.2 % (ref 37.5–51)
HGB BLD-MCNC: 7.9 G/DL (ref 13–17.7)
IMM GRANULOCYTES # BLD AUTO: 0.02 10*3/MM3 (ref 0–0.05)
IMM GRANULOCYTES NFR BLD AUTO: 0.5 % (ref 0–0.5)
LYMPHOCYTES # BLD AUTO: 0.94 10*3/MM3 (ref 0.7–3.1)
LYMPHOCYTES NFR BLD AUTO: 24.2 % (ref 19.6–45.3)
MCH RBC QN AUTO: 25.8 PG (ref 26.6–33)
MCHC RBC AUTO-ENTMCNC: 31.3 G/DL (ref 31.5–35.7)
MCV RBC AUTO: 82.4 FL (ref 79–97)
MONOCYTES # BLD AUTO: 0.41 10*3/MM3 (ref 0.1–0.9)
MONOCYTES NFR BLD AUTO: 10.5 % (ref 5–12)
NEUTROPHILS NFR BLD AUTO: 2.27 10*3/MM3 (ref 1.7–7)
NEUTROPHILS NFR BLD AUTO: 58.3 % (ref 42.7–76)
NRBC BLD AUTO-RTO: 0 /100 WBC (ref 0–0.2)
PLATELET # BLD AUTO: 105 10*3/MM3 (ref 140–450)
PMV BLD AUTO: 9.5 FL (ref 6–12)
POTASSIUM SERPL-SCNC: 4.3 MMOL/L (ref 3.5–5.2)
PROT SERPL-MCNC: 5.8 G/DL (ref 6–8.5)
RBC # BLD AUTO: 3.06 10*6/MM3 (ref 4.14–5.8)
SODIUM SERPL-SCNC: 130 MMOL/L (ref 136–145)
T4 FREE SERPL-MCNC: 0.87 NG/DL (ref 0.93–1.7)
TSH SERPL DL<=0.05 MIU/L-ACNC: 2.39 UIU/ML (ref 0.27–4.2)
WBC NRBC COR # BLD: 3.89 10*3/MM3 (ref 3.4–10.8)

## 2022-10-10 PROCEDURE — 84439 ASSAY OF FREE THYROXINE: CPT | Performed by: PHYSICIAN ASSISTANT

## 2022-10-10 PROCEDURE — 86140 C-REACTIVE PROTEIN: CPT | Performed by: INTERNAL MEDICINE

## 2022-10-10 PROCEDURE — 85025 COMPLETE CBC W/AUTO DIFF WBC: CPT | Performed by: INTERNAL MEDICINE

## 2022-10-10 PROCEDURE — 80053 COMPREHEN METABOLIC PANEL: CPT | Performed by: INTERNAL MEDICINE

## 2022-10-10 PROCEDURE — 99232 SBSQ HOSP IP/OBS MODERATE 35: CPT | Performed by: INTERNAL MEDICINE

## 2022-10-10 PROCEDURE — 82962 GLUCOSE BLOOD TEST: CPT

## 2022-10-10 PROCEDURE — 84443 ASSAY THYROID STIM HORMONE: CPT | Performed by: PHYSICIAN ASSISTANT

## 2022-10-11 ENCOUNTER — OUTSIDE FACILITY SERVICE (OUTPATIENT)
Dept: INFECTIOUS DISEASES | Facility: CLINIC | Age: 57
End: 2022-10-11

## 2022-10-11 LAB
ALBUMIN SERPL-MCNC: 2.48 G/DL (ref 3.5–5.2)
ALBUMIN/GLOB SERPL: 0.6 G/DL
ALP SERPL-CCNC: 364 U/L (ref 39–117)
ALT SERPL W P-5'-P-CCNC: 44 U/L (ref 1–41)
ANION GAP SERPL CALCULATED.3IONS-SCNC: 9.4 MMOL/L (ref 5–15)
AST SERPL-CCNC: 45 U/L (ref 1–40)
BASOPHILS # BLD AUTO: 0.02 10*3/MM3 (ref 0–0.2)
BASOPHILS NFR BLD AUTO: 0.4 % (ref 0–1.5)
BILIRUB SERPL-MCNC: 0.7 MG/DL (ref 0–1.2)
BUN SERPL-MCNC: 13 MG/DL (ref 6–20)
BUN/CREAT SERPL: 19.1 (ref 7–25)
CALCIUM SPEC-SCNC: 8.6 MG/DL (ref 8.6–10.5)
CHLORIDE SERPL-SCNC: 100 MMOL/L (ref 98–107)
CO2 SERPL-SCNC: 23.6 MMOL/L (ref 22–29)
CREAT SERPL-MCNC: 0.68 MG/DL (ref 0.76–1.27)
CRP SERPL-MCNC: 6.15 MG/DL (ref 0–0.5)
DEPRECATED RDW RBC AUTO: 58.8 FL (ref 37–54)
EGFRCR SERPLBLD CKD-EPI 2021: 108.4 ML/MIN/1.73
EOSINOPHIL # BLD AUTO: 0.19 10*3/MM3 (ref 0–0.4)
EOSINOPHIL NFR BLD AUTO: 4.1 % (ref 0.3–6.2)
ERYTHROCYTE [DISTWIDTH] IN BLOOD BY AUTOMATED COUNT: 19.7 % (ref 12.3–15.4)
GLOBULIN UR ELPH-MCNC: 4 GM/DL
GLUCOSE BLDC GLUCOMTR-MCNC: 181 MG/DL (ref 70–130)
GLUCOSE BLDC GLUCOMTR-MCNC: 250 MG/DL (ref 70–130)
GLUCOSE BLDC GLUCOMTR-MCNC: 267 MG/DL (ref 70–130)
GLUCOSE BLDC GLUCOMTR-MCNC: 331 MG/DL (ref 70–130)
GLUCOSE SERPL-MCNC: 141 MG/DL (ref 65–99)
HCT VFR BLD AUTO: 27.5 % (ref 37.5–51)
HGB BLD-MCNC: 8.7 G/DL (ref 13–17.7)
IMM GRANULOCYTES # BLD AUTO: 0.01 10*3/MM3 (ref 0–0.05)
IMM GRANULOCYTES NFR BLD AUTO: 0.2 % (ref 0–0.5)
LYMPHOCYTES # BLD AUTO: 0.97 10*3/MM3 (ref 0.7–3.1)
LYMPHOCYTES NFR BLD AUTO: 20.7 % (ref 19.6–45.3)
MCH RBC QN AUTO: 26.2 PG (ref 26.6–33)
MCHC RBC AUTO-ENTMCNC: 31.6 G/DL (ref 31.5–35.7)
MCV RBC AUTO: 82.8 FL (ref 79–97)
MONOCYTES # BLD AUTO: 0.37 10*3/MM3 (ref 0.1–0.9)
MONOCYTES NFR BLD AUTO: 7.9 % (ref 5–12)
NEUTROPHILS NFR BLD AUTO: 3.12 10*3/MM3 (ref 1.7–7)
NEUTROPHILS NFR BLD AUTO: 66.7 % (ref 42.7–76)
NRBC BLD AUTO-RTO: 0 /100 WBC (ref 0–0.2)
PLATELET # BLD AUTO: 111 10*3/MM3 (ref 140–450)
PMV BLD AUTO: 9.8 FL (ref 6–12)
POTASSIUM SERPL-SCNC: 4.4 MMOL/L (ref 3.5–5.2)
PROT SERPL-MCNC: 6.5 G/DL (ref 6–8.5)
RBC # BLD AUTO: 3.32 10*6/MM3 (ref 4.14–5.8)
SODIUM SERPL-SCNC: 133 MMOL/L (ref 136–145)
WBC NRBC COR # BLD: 4.68 10*3/MM3 (ref 3.4–10.8)

## 2022-10-11 PROCEDURE — 80053 COMPREHEN METABOLIC PANEL: CPT | Performed by: INTERNAL MEDICINE

## 2022-10-11 PROCEDURE — 97116 GAIT TRAINING THERAPY: CPT

## 2022-10-11 PROCEDURE — 82962 GLUCOSE BLOOD TEST: CPT

## 2022-10-11 PROCEDURE — 86140 C-REACTIVE PROTEIN: CPT | Performed by: INTERNAL MEDICINE

## 2022-10-11 PROCEDURE — 85025 COMPLETE CBC W/AUTO DIFF WBC: CPT | Performed by: INTERNAL MEDICINE

## 2022-10-11 PROCEDURE — 99232 SBSQ HOSP IP/OBS MODERATE 35: CPT | Performed by: INTERNAL MEDICINE

## 2022-10-12 ENCOUNTER — OUTSIDE FACILITY SERVICE (OUTPATIENT)
Dept: INFECTIOUS DISEASES | Facility: CLINIC | Age: 57
End: 2022-10-12

## 2022-10-12 LAB
ALBUMIN SERPL-MCNC: 2.65 G/DL (ref 3.5–5.2)
ALBUMIN/GLOB SERPL: 0.7 G/DL
ALP SERPL-CCNC: 381 U/L (ref 39–117)
ALT SERPL W P-5'-P-CCNC: 48 U/L (ref 1–41)
ANION GAP SERPL CALCULATED.3IONS-SCNC: 8.3 MMOL/L (ref 5–15)
AST SERPL-CCNC: 52 U/L (ref 1–40)
BASOPHILS # BLD AUTO: 0.02 10*3/MM3 (ref 0–0.2)
BASOPHILS NFR BLD AUTO: 0.5 % (ref 0–1.5)
BILIRUB SERPL-MCNC: 0.6 MG/DL (ref 0–1.2)
BUN SERPL-MCNC: 12 MG/DL (ref 6–20)
BUN/CREAT SERPL: 17.6 (ref 7–25)
CALCIUM SPEC-SCNC: 8.4 MG/DL (ref 8.6–10.5)
CHLORIDE SERPL-SCNC: 102 MMOL/L (ref 98–107)
CO2 SERPL-SCNC: 25.7 MMOL/L (ref 22–29)
CREAT SERPL-MCNC: 0.68 MG/DL (ref 0.76–1.27)
CRP SERPL-MCNC: 5.07 MG/DL (ref 0–0.5)
DEPRECATED RDW RBC AUTO: 58.3 FL (ref 37–54)
EGFRCR SERPLBLD CKD-EPI 2021: 108.4 ML/MIN/1.73
EOSINOPHIL # BLD AUTO: 0.12 10*3/MM3 (ref 0–0.4)
EOSINOPHIL NFR BLD AUTO: 2.9 % (ref 0.3–6.2)
ERYTHROCYTE [DISTWIDTH] IN BLOOD BY AUTOMATED COUNT: 19.8 % (ref 12.3–15.4)
GLOBULIN UR ELPH-MCNC: 3.6 GM/DL
GLUCOSE BLDC GLUCOMTR-MCNC: 152 MG/DL (ref 70–130)
GLUCOSE BLDC GLUCOMTR-MCNC: 233 MG/DL (ref 70–130)
GLUCOSE BLDC GLUCOMTR-MCNC: 277 MG/DL (ref 70–130)
GLUCOSE BLDC GLUCOMTR-MCNC: 299 MG/DL (ref 70–130)
GLUCOSE SERPL-MCNC: 166 MG/DL (ref 65–99)
HCT VFR BLD AUTO: 26.9 % (ref 37.5–51)
HGB BLD-MCNC: 8.4 G/DL (ref 13–17.7)
IMM GRANULOCYTES # BLD AUTO: 0.01 10*3/MM3 (ref 0–0.05)
IMM GRANULOCYTES NFR BLD AUTO: 0.2 % (ref 0–0.5)
LYMPHOCYTES # BLD AUTO: 0.94 10*3/MM3 (ref 0.7–3.1)
LYMPHOCYTES NFR BLD AUTO: 23 % (ref 19.6–45.3)
MCH RBC QN AUTO: 25.6 PG (ref 26.6–33)
MCHC RBC AUTO-ENTMCNC: 31.2 G/DL (ref 31.5–35.7)
MCV RBC AUTO: 82 FL (ref 79–97)
MONOCYTES # BLD AUTO: 0.33 10*3/MM3 (ref 0.1–0.9)
MONOCYTES NFR BLD AUTO: 8.1 % (ref 5–12)
NEUTROPHILS NFR BLD AUTO: 2.66 10*3/MM3 (ref 1.7–7)
NEUTROPHILS NFR BLD AUTO: 65.3 % (ref 42.7–76)
NRBC BLD AUTO-RTO: 0 /100 WBC (ref 0–0.2)
PLATELET # BLD AUTO: 105 10*3/MM3 (ref 140–450)
PMV BLD AUTO: 9.7 FL (ref 6–12)
POTASSIUM SERPL-SCNC: 4.4 MMOL/L (ref 3.5–5.2)
PROT SERPL-MCNC: 6.2 G/DL (ref 6–8.5)
RBC # BLD AUTO: 3.28 10*6/MM3 (ref 4.14–5.8)
SODIUM SERPL-SCNC: 136 MMOL/L (ref 136–145)
WBC NRBC COR # BLD: 4.08 10*3/MM3 (ref 3.4–10.8)

## 2022-10-12 PROCEDURE — 82962 GLUCOSE BLOOD TEST: CPT

## 2022-10-12 PROCEDURE — 85025 COMPLETE CBC W/AUTO DIFF WBC: CPT | Performed by: INTERNAL MEDICINE

## 2022-10-12 PROCEDURE — 99232 SBSQ HOSP IP/OBS MODERATE 35: CPT | Performed by: INTERNAL MEDICINE

## 2022-10-12 PROCEDURE — 86140 C-REACTIVE PROTEIN: CPT | Performed by: INTERNAL MEDICINE

## 2022-10-12 PROCEDURE — 80053 COMPREHEN METABOLIC PANEL: CPT | Performed by: INTERNAL MEDICINE

## 2022-10-13 ENCOUNTER — APPOINTMENT (OUTPATIENT)
Dept: CT IMAGING | Facility: HOSPITAL | Age: 57
End: 2022-10-13

## 2022-10-13 ENCOUNTER — OUTSIDE FACILITY SERVICE (OUTPATIENT)
Dept: INFECTIOUS DISEASES | Facility: CLINIC | Age: 57
End: 2022-10-13

## 2022-10-13 ENCOUNTER — APPOINTMENT (OUTPATIENT)
Dept: ULTRASOUND IMAGING | Facility: HOSPITAL | Age: 57
End: 2022-10-13

## 2022-10-13 LAB
ALBUMIN SERPL-MCNC: 2.29 G/DL (ref 3.5–5.2)
ALBUMIN/GLOB SERPL: 0.6 G/DL
ALP SERPL-CCNC: 363 U/L (ref 39–117)
ALT SERPL W P-5'-P-CCNC: 45 U/L (ref 1–41)
ANION GAP SERPL CALCULATED.3IONS-SCNC: 10.5 MMOL/L (ref 5–15)
APTT PPP: 36.8 SECONDS (ref 26.5–34.5)
AST SERPL-CCNC: 48 U/L (ref 1–40)
BASOPHILS # BLD AUTO: 0.01 10*3/MM3 (ref 0–0.2)
BASOPHILS # BLD AUTO: 0.02 10*3/MM3 (ref 0–0.2)
BASOPHILS NFR BLD AUTO: 0.2 % (ref 0–1.5)
BASOPHILS NFR BLD AUTO: 0.4 % (ref 0–1.5)
BILIRUB SERPL-MCNC: 0.7 MG/DL (ref 0–1.2)
BUN SERPL-MCNC: 12 MG/DL (ref 6–20)
BUN/CREAT SERPL: 20.7 (ref 7–25)
CALCIUM SPEC-SCNC: 8.4 MG/DL (ref 8.6–10.5)
CHLORIDE SERPL-SCNC: 99 MMOL/L (ref 98–107)
CO2 SERPL-SCNC: 24.5 MMOL/L (ref 22–29)
CREAT SERPL-MCNC: 0.58 MG/DL (ref 0.76–1.27)
CRP SERPL-MCNC: 6.07 MG/DL (ref 0–0.5)
D DIMER PPP FEU-MCNC: 3.39 MCGFEU/ML (ref 0–0.5)
DEPRECATED RDW RBC AUTO: 57.1 FL (ref 37–54)
DEPRECATED RDW RBC AUTO: 58.4 FL (ref 37–54)
EGFRCR SERPLBLD CKD-EPI 2021: 113.8 ML/MIN/1.73
EOSINOPHIL # BLD AUTO: 0.14 10*3/MM3 (ref 0–0.4)
EOSINOPHIL # BLD AUTO: 0.15 10*3/MM3 (ref 0–0.4)
EOSINOPHIL NFR BLD AUTO: 3.1 % (ref 0.3–6.2)
EOSINOPHIL NFR BLD AUTO: 3.2 % (ref 0.3–6.2)
ERYTHROCYTE [DISTWIDTH] IN BLOOD BY AUTOMATED COUNT: 19.4 % (ref 12.3–15.4)
ERYTHROCYTE [DISTWIDTH] IN BLOOD BY AUTOMATED COUNT: 19.9 % (ref 12.3–15.4)
FIBRINOGEN PPP-MCNC: 393 MG/DL (ref 173–524)
GLOBULIN UR ELPH-MCNC: 3.9 GM/DL
GLUCOSE BLDC GLUCOMTR-MCNC: 256 MG/DL (ref 70–130)
GLUCOSE BLDC GLUCOMTR-MCNC: 297 MG/DL (ref 70–130)
GLUCOSE BLDC GLUCOMTR-MCNC: 352 MG/DL (ref 70–130)
GLUCOSE BLDC GLUCOMTR-MCNC: 94 MG/DL (ref 70–130)
GLUCOSE SERPL-MCNC: 109 MG/DL (ref 65–99)
HCT VFR BLD AUTO: 25.4 % (ref 37.5–51)
HCT VFR BLD AUTO: 26.6 % (ref 37.5–51)
HGB BLD-MCNC: 8 G/DL (ref 13–17.7)
HGB BLD-MCNC: 8.4 G/DL (ref 13–17.7)
IMM GRANULOCYTES # BLD AUTO: 0.02 10*3/MM3 (ref 0–0.05)
IMM GRANULOCYTES # BLD AUTO: 0.02 10*3/MM3 (ref 0–0.05)
IMM GRANULOCYTES NFR BLD AUTO: 0.4 % (ref 0–0.5)
IMM GRANULOCYTES NFR BLD AUTO: 0.5 % (ref 0–0.5)
INR PPP: 1.22 (ref 0.9–1.1)
LYMPHOCYTES # BLD AUTO: 0.91 10*3/MM3 (ref 0.7–3.1)
LYMPHOCYTES # BLD AUTO: 1.1 10*3/MM3 (ref 0.7–3.1)
LYMPHOCYTES NFR BLD AUTO: 18.9 % (ref 19.6–45.3)
LYMPHOCYTES NFR BLD AUTO: 25.3 % (ref 19.6–45.3)
MCH RBC QN AUTO: 25.8 PG (ref 26.6–33)
MCH RBC QN AUTO: 25.9 PG (ref 26.6–33)
MCHC RBC AUTO-ENTMCNC: 31.5 G/DL (ref 31.5–35.7)
MCHC RBC AUTO-ENTMCNC: 31.6 G/DL (ref 31.5–35.7)
MCV RBC AUTO: 81.6 FL (ref 79–97)
MCV RBC AUTO: 82.2 FL (ref 79–97)
MONOCYTES # BLD AUTO: 0.39 10*3/MM3 (ref 0.1–0.9)
MONOCYTES # BLD AUTO: 0.46 10*3/MM3 (ref 0.1–0.9)
MONOCYTES NFR BLD AUTO: 9 % (ref 5–12)
MONOCYTES NFR BLD AUTO: 9.6 % (ref 5–12)
NEUTROPHILS NFR BLD AUTO: 2.69 10*3/MM3 (ref 1.7–7)
NEUTROPHILS NFR BLD AUTO: 3.25 10*3/MM3 (ref 1.7–7)
NEUTROPHILS NFR BLD AUTO: 61.8 % (ref 42.7–76)
NEUTROPHILS NFR BLD AUTO: 67.6 % (ref 42.7–76)
NRBC BLD AUTO-RTO: 0 /100 WBC (ref 0–0.2)
NRBC BLD AUTO-RTO: 0 /100 WBC (ref 0–0.2)
PLATELET # BLD AUTO: 100 10*3/MM3 (ref 140–450)
PLATELET # BLD AUTO: 104 10*3/MM3 (ref 140–450)
PMV BLD AUTO: 10.1 FL (ref 6–12)
PMV BLD AUTO: 9.4 FL (ref 6–12)
POTASSIUM SERPL-SCNC: 4.1 MMOL/L (ref 3.5–5.2)
PROT SERPL-MCNC: 6.2 G/DL (ref 6–8.5)
PROTHROMBIN TIME: 15.6 SECONDS (ref 12.1–14.7)
RBC # BLD AUTO: 3.09 10*6/MM3 (ref 4.14–5.8)
RBC # BLD AUTO: 3.26 10*6/MM3 (ref 4.14–5.8)
SODIUM SERPL-SCNC: 134 MMOL/L (ref 136–145)
WBC NRBC COR # BLD: 4.35 10*3/MM3 (ref 3.4–10.8)
WBC NRBC COR # BLD: 4.81 10*3/MM3 (ref 3.4–10.8)

## 2022-10-13 PROCEDURE — 86022 PLATELET ANTIBODIES: CPT | Performed by: PHYSICIAN ASSISTANT

## 2022-10-13 PROCEDURE — 85025 COMPLETE CBC W/AUTO DIFF WBC: CPT | Performed by: INTERNAL MEDICINE

## 2022-10-13 PROCEDURE — 86140 C-REACTIVE PROTEIN: CPT | Performed by: INTERNAL MEDICINE

## 2022-10-13 PROCEDURE — 71275 CT ANGIOGRAPHY CHEST: CPT

## 2022-10-13 PROCEDURE — 93970 EXTREMITY STUDY: CPT

## 2022-10-13 PROCEDURE — 85610 PROTHROMBIN TIME: CPT | Performed by: PHYSICIAN ASSISTANT

## 2022-10-13 PROCEDURE — 82962 GLUCOSE BLOOD TEST: CPT

## 2022-10-13 PROCEDURE — 85730 THROMBOPLASTIN TIME PARTIAL: CPT | Performed by: PHYSICIAN ASSISTANT

## 2022-10-13 PROCEDURE — 73700 CT LOWER EXTREMITY W/O DYE: CPT

## 2022-10-13 PROCEDURE — 80053 COMPREHEN METABOLIC PANEL: CPT | Performed by: INTERNAL MEDICINE

## 2022-10-13 PROCEDURE — 85025 COMPLETE CBC W/AUTO DIFF WBC: CPT | Performed by: PHYSICIAN ASSISTANT

## 2022-10-13 PROCEDURE — 85379 FIBRIN DEGRADATION QUANT: CPT | Performed by: PHYSICIAN ASSISTANT

## 2022-10-13 PROCEDURE — 85384 FIBRINOGEN ACTIVITY: CPT | Performed by: PHYSICIAN ASSISTANT

## 2022-10-13 PROCEDURE — 99232 SBSQ HOSP IP/OBS MODERATE 35: CPT | Performed by: INTERNAL MEDICINE

## 2022-10-13 PROCEDURE — 85362 FIBRIN DEGRADATION PRODUCTS: CPT | Performed by: PHYSICIAN ASSISTANT

## 2022-10-13 PROCEDURE — 73700 CT LOWER EXTREMITY W/O DYE: CPT | Performed by: RADIOLOGY

## 2022-10-13 RX ADMIN — IOPAMIDOL 80 ML: 755 INJECTION, SOLUTION INTRAVENOUS at 23:40

## 2022-10-14 ENCOUNTER — OUTSIDE FACILITY SERVICE (OUTPATIENT)
Dept: INFECTIOUS DISEASES | Facility: CLINIC | Age: 57
End: 2022-10-14

## 2022-10-14 LAB
ALBUMIN SERPL-MCNC: 2.27 G/DL (ref 3.5–5.2)
ALBUMIN/GLOB SERPL: 0.6 G/DL
ALP SERPL-CCNC: 362 U/L (ref 39–117)
ALT SERPL W P-5'-P-CCNC: 42 U/L (ref 1–41)
ANION GAP SERPL CALCULATED.3IONS-SCNC: 7.9 MMOL/L (ref 5–15)
AST SERPL-CCNC: 40 U/L (ref 1–40)
BASOPHILS # BLD AUTO: 0 10*3/MM3 (ref 0–0.2)
BASOPHILS NFR BLD AUTO: 0 % (ref 0–1.5)
BILIRUB SERPL-MCNC: 0.7 MG/DL (ref 0–1.2)
BUN SERPL-MCNC: 13 MG/DL (ref 6–20)
BUN/CREAT SERPL: 21 (ref 7–25)
CALCIUM SPEC-SCNC: 8.2 MG/DL (ref 8.6–10.5)
CHLORIDE SERPL-SCNC: 101 MMOL/L (ref 98–107)
CO2 SERPL-SCNC: 25.1 MMOL/L (ref 22–29)
CREAT SERPL-MCNC: 0.62 MG/DL (ref 0.76–1.27)
CRP SERPL-MCNC: 6.43 MG/DL (ref 0–0.5)
DEPRECATED RDW RBC AUTO: 58.8 FL (ref 37–54)
EGFRCR SERPLBLD CKD-EPI 2021: 111.5 ML/MIN/1.73
EOSINOPHIL # BLD AUTO: 0.06 10*3/MM3 (ref 0–0.4)
EOSINOPHIL NFR BLD AUTO: 1.6 % (ref 0.3–6.2)
ERYTHROCYTE [DISTWIDTH] IN BLOOD BY AUTOMATED COUNT: 19.2 % (ref 12.3–15.4)
GLOBULIN UR ELPH-MCNC: 3.8 GM/DL
GLUCOSE BLDC GLUCOMTR-MCNC: 187 MG/DL (ref 70–130)
GLUCOSE BLDC GLUCOMTR-MCNC: 245 MG/DL (ref 70–130)
GLUCOSE BLDC GLUCOMTR-MCNC: 326 MG/DL (ref 70–130)
GLUCOSE BLDC GLUCOMTR-MCNC: 354 MG/DL (ref 70–130)
GLUCOSE SERPL-MCNC: 206 MG/DL (ref 65–99)
HCT VFR BLD AUTO: 25.1 % (ref 37.5–51)
HGB BLD-MCNC: 7.9 G/DL (ref 13–17.7)
IMM GRANULOCYTES # BLD AUTO: 0.01 10*3/MM3 (ref 0–0.05)
IMM GRANULOCYTES NFR BLD AUTO: 0.3 % (ref 0–0.5)
LYMPHOCYTES # BLD AUTO: 0.74 10*3/MM3 (ref 0.7–3.1)
LYMPHOCYTES NFR BLD AUTO: 19.2 % (ref 19.6–45.3)
MCH RBC QN AUTO: 26.2 PG (ref 26.6–33)
MCHC RBC AUTO-ENTMCNC: 31.5 G/DL (ref 31.5–35.7)
MCV RBC AUTO: 83.1 FL (ref 79–97)
MONOCYTES # BLD AUTO: 0.37 10*3/MM3 (ref 0.1–0.9)
MONOCYTES NFR BLD AUTO: 9.6 % (ref 5–12)
NEUTROPHILS NFR BLD AUTO: 2.68 10*3/MM3 (ref 1.7–7)
NEUTROPHILS NFR BLD AUTO: 69.3 % (ref 42.7–76)
NRBC BLD AUTO-RTO: 0 /100 WBC (ref 0–0.2)
PLATELET # BLD AUTO: 85 10*3/MM3 (ref 140–450)
PMV BLD AUTO: 9.7 FL (ref 6–12)
POTASSIUM SERPL-SCNC: 4.4 MMOL/L (ref 3.5–5.2)
PROT SERPL-MCNC: 6.1 G/DL (ref 6–8.5)
RBC # BLD AUTO: 3.02 10*6/MM3 (ref 4.14–5.8)
SODIUM SERPL-SCNC: 134 MMOL/L (ref 136–145)
WBC NRBC COR # BLD: 3.86 10*3/MM3 (ref 3.4–10.8)

## 2022-10-14 PROCEDURE — 85025 COMPLETE CBC W/AUTO DIFF WBC: CPT | Performed by: INTERNAL MEDICINE

## 2022-10-14 PROCEDURE — 0 IOPAMIDOL PER 1 ML: Performed by: INTERNAL MEDICINE

## 2022-10-14 PROCEDURE — 99232 SBSQ HOSP IP/OBS MODERATE 35: CPT | Performed by: INTERNAL MEDICINE

## 2022-10-14 PROCEDURE — 82962 GLUCOSE BLOOD TEST: CPT

## 2022-10-14 PROCEDURE — 80053 COMPREHEN METABOLIC PANEL: CPT | Performed by: INTERNAL MEDICINE

## 2022-10-14 PROCEDURE — 86140 C-REACTIVE PROTEIN: CPT | Performed by: INTERNAL MEDICINE

## 2022-10-15 LAB
ALBUMIN SERPL-MCNC: 2.23 G/DL (ref 3.5–5.2)
ALBUMIN/GLOB SERPL: 0.6 G/DL
ALP SERPL-CCNC: 333 U/L (ref 39–117)
ALT SERPL W P-5'-P-CCNC: 35 U/L (ref 1–41)
ANION GAP SERPL CALCULATED.3IONS-SCNC: 7.2 MMOL/L (ref 5–15)
AST SERPL-CCNC: 34 U/L (ref 1–40)
BASOPHILS # BLD AUTO: 0.01 10*3/MM3 (ref 0–0.2)
BASOPHILS NFR BLD AUTO: 0.3 % (ref 0–1.5)
BILIRUB SERPL-MCNC: 0.6 MG/DL (ref 0–1.2)
BUN SERPL-MCNC: 12 MG/DL (ref 6–20)
BUN/CREAT SERPL: 20.3 (ref 7–25)
CALCIUM SPEC-SCNC: 8.1 MG/DL (ref 8.6–10.5)
CHLORIDE SERPL-SCNC: 100 MMOL/L (ref 98–107)
CO2 SERPL-SCNC: 24.8 MMOL/L (ref 22–29)
CREAT SERPL-MCNC: 0.59 MG/DL (ref 0.76–1.27)
CRP SERPL-MCNC: 6.21 MG/DL (ref 0–0.5)
DEPRECATED RDW RBC AUTO: 58.6 FL (ref 37–54)
EGFRCR SERPLBLD CKD-EPI 2021: 113.2 ML/MIN/1.73
EOSINOPHIL # BLD AUTO: 0.12 10*3/MM3 (ref 0–0.4)
EOSINOPHIL NFR BLD AUTO: 3.6 % (ref 0.3–6.2)
ERYTHROCYTE [DISTWIDTH] IN BLOOD BY AUTOMATED COUNT: 19.2 % (ref 12.3–15.4)
GLOBULIN UR ELPH-MCNC: 3.9 GM/DL
GLUCOSE BLDC GLUCOMTR-MCNC: 147 MG/DL (ref 70–130)
GLUCOSE BLDC GLUCOMTR-MCNC: 223 MG/DL (ref 70–130)
GLUCOSE BLDC GLUCOMTR-MCNC: 224 MG/DL (ref 70–130)
GLUCOSE BLDC GLUCOMTR-MCNC: 292 MG/DL (ref 70–130)
GLUCOSE SERPL-MCNC: 150 MG/DL (ref 65–99)
HCT VFR BLD AUTO: 25.9 % (ref 37.5–51)
HGB BLD-MCNC: 8 G/DL (ref 13–17.7)
IMM GRANULOCYTES # BLD AUTO: 0.01 10*3/MM3 (ref 0–0.05)
IMM GRANULOCYTES NFR BLD AUTO: 0.3 % (ref 0–0.5)
LYMPHOCYTES # BLD AUTO: 0.8 10*3/MM3 (ref 0.7–3.1)
LYMPHOCYTES NFR BLD AUTO: 23.7 % (ref 19.6–45.3)
MCH RBC QN AUTO: 25.9 PG (ref 26.6–33)
MCHC RBC AUTO-ENTMCNC: 30.9 G/DL (ref 31.5–35.7)
MCV RBC AUTO: 83.8 FL (ref 79–97)
MONOCYTES # BLD AUTO: 0.34 10*3/MM3 (ref 0.1–0.9)
MONOCYTES NFR BLD AUTO: 10.1 % (ref 5–12)
NEUTROPHILS NFR BLD AUTO: 2.1 10*3/MM3 (ref 1.7–7)
NEUTROPHILS NFR BLD AUTO: 62 % (ref 42.7–76)
NRBC BLD AUTO-RTO: 0 /100 WBC (ref 0–0.2)
PF4 HEPARIN CMPLX IGG SERPL IA: 0.14 OD (ref 0–0.4)
PLATELET # BLD AUTO: 88 10*3/MM3 (ref 140–450)
PMV BLD AUTO: 9.7 FL (ref 6–12)
POTASSIUM SERPL-SCNC: 4.1 MMOL/L (ref 3.5–5.2)
PROT SERPL-MCNC: 6.1 G/DL (ref 6–8.5)
RBC # BLD AUTO: 3.09 10*6/MM3 (ref 4.14–5.8)
SODIUM SERPL-SCNC: 132 MMOL/L (ref 136–145)
WBC NRBC COR # BLD: 3.38 10*3/MM3 (ref 3.4–10.8)

## 2022-10-15 PROCEDURE — 80053 COMPREHEN METABOLIC PANEL: CPT | Performed by: INTERNAL MEDICINE

## 2022-10-15 PROCEDURE — 82962 GLUCOSE BLOOD TEST: CPT

## 2022-10-15 PROCEDURE — 86140 C-REACTIVE PROTEIN: CPT | Performed by: INTERNAL MEDICINE

## 2022-10-15 PROCEDURE — 85025 COMPLETE CBC W/AUTO DIFF WBC: CPT | Performed by: INTERNAL MEDICINE

## 2022-10-16 LAB
ALBUMIN SERPL-MCNC: 2.36 G/DL (ref 3.5–5.2)
ALBUMIN/GLOB SERPL: 0.6 G/DL
ALP SERPL-CCNC: 353 U/L (ref 39–117)
ALT SERPL W P-5'-P-CCNC: 33 U/L (ref 1–41)
ANION GAP SERPL CALCULATED.3IONS-SCNC: 8.9 MMOL/L (ref 5–15)
AST SERPL-CCNC: 37 U/L (ref 1–40)
BASOPHILS # BLD AUTO: 0.01 10*3/MM3 (ref 0–0.2)
BASOPHILS NFR BLD AUTO: 0.3 % (ref 0–1.5)
BILIRUB SERPL-MCNC: 0.5 MG/DL (ref 0–1.2)
BUN SERPL-MCNC: 10 MG/DL (ref 6–20)
BUN/CREAT SERPL: 17.5 (ref 7–25)
CALCIUM SPEC-SCNC: 8.1 MG/DL (ref 8.6–10.5)
CHLORIDE SERPL-SCNC: 100 MMOL/L (ref 98–107)
CO2 SERPL-SCNC: 25.1 MMOL/L (ref 22–29)
CREAT SERPL-MCNC: 0.57 MG/DL (ref 0.76–1.27)
CRP SERPL-MCNC: 5.07 MG/DL (ref 0–0.5)
DEPRECATED RDW RBC AUTO: 58.2 FL (ref 37–54)
EGFRCR SERPLBLD CKD-EPI 2021: 114.4 ML/MIN/1.73
EOSINOPHIL # BLD AUTO: 0.1 10*3/MM3 (ref 0–0.4)
EOSINOPHIL NFR BLD AUTO: 2.9 % (ref 0.3–6.2)
ERYTHROCYTE [DISTWIDTH] IN BLOOD BY AUTOMATED COUNT: 19.3 % (ref 12.3–15.4)
FSP PPP-MCNC: 10 UG/ML
GLOBULIN UR ELPH-MCNC: 3.6 GM/DL
GLUCOSE BLDC GLUCOMTR-MCNC: 194 MG/DL (ref 70–130)
GLUCOSE BLDC GLUCOMTR-MCNC: 206 MG/DL (ref 70–130)
GLUCOSE BLDC GLUCOMTR-MCNC: 288 MG/DL (ref 70–130)
GLUCOSE BLDC GLUCOMTR-MCNC: 93 MG/DL (ref 70–130)
GLUCOSE SERPL-MCNC: 128 MG/DL (ref 65–99)
HCT VFR BLD AUTO: 25.9 % (ref 37.5–51)
HGB BLD-MCNC: 8.1 G/DL (ref 13–17.7)
IMM GRANULOCYTES # BLD AUTO: 0.02 10*3/MM3 (ref 0–0.05)
IMM GRANULOCYTES NFR BLD AUTO: 0.6 % (ref 0–0.5)
LYMPHOCYTES # BLD AUTO: 0.88 10*3/MM3 (ref 0.7–3.1)
LYMPHOCYTES NFR BLD AUTO: 25.3 % (ref 19.6–45.3)
MCH RBC QN AUTO: 25.8 PG (ref 26.6–33)
MCHC RBC AUTO-ENTMCNC: 31.3 G/DL (ref 31.5–35.7)
MCV RBC AUTO: 82.5 FL (ref 79–97)
MONOCYTES # BLD AUTO: 0.36 10*3/MM3 (ref 0.1–0.9)
MONOCYTES NFR BLD AUTO: 10.3 % (ref 5–12)
NEUTROPHILS NFR BLD AUTO: 2.11 10*3/MM3 (ref 1.7–7)
NEUTROPHILS NFR BLD AUTO: 60.6 % (ref 42.7–76)
NRBC BLD AUTO-RTO: 0 /100 WBC (ref 0–0.2)
PLATELET # BLD AUTO: 104 10*3/MM3 (ref 140–450)
PMV BLD AUTO: 10.1 FL (ref 6–12)
POTASSIUM SERPL-SCNC: 4.2 MMOL/L (ref 3.5–5.2)
PROT SERPL-MCNC: 6 G/DL (ref 6–8.5)
RBC # BLD AUTO: 3.14 10*6/MM3 (ref 4.14–5.8)
SODIUM SERPL-SCNC: 134 MMOL/L (ref 136–145)
WBC NRBC COR # BLD: 3.48 10*3/MM3 (ref 3.4–10.8)

## 2022-10-16 PROCEDURE — 80053 COMPREHEN METABOLIC PANEL: CPT | Performed by: INTERNAL MEDICINE

## 2022-10-16 PROCEDURE — 82962 GLUCOSE BLOOD TEST: CPT

## 2022-10-16 PROCEDURE — 86140 C-REACTIVE PROTEIN: CPT | Performed by: INTERNAL MEDICINE

## 2022-10-16 PROCEDURE — 85025 COMPLETE CBC W/AUTO DIFF WBC: CPT | Performed by: INTERNAL MEDICINE

## 2022-10-17 ENCOUNTER — OUTSIDE FACILITY SERVICE (OUTPATIENT)
Dept: INFECTIOUS DISEASES | Facility: CLINIC | Age: 57
End: 2022-10-17

## 2022-10-17 LAB
ALBUMIN SERPL-MCNC: 2.2 G/DL (ref 3.5–5.2)
ALBUMIN/GLOB SERPL: 0.6 G/DL
ALP SERPL-CCNC: 350 U/L (ref 39–117)
ALT SERPL W P-5'-P-CCNC: 32 U/L (ref 1–41)
ANION GAP SERPL CALCULATED.3IONS-SCNC: 8.8 MMOL/L (ref 5–15)
AST SERPL-CCNC: 35 U/L (ref 1–40)
BASOPHILS # BLD AUTO: 0.01 10*3/MM3 (ref 0–0.2)
BASOPHILS NFR BLD AUTO: 0.3 % (ref 0–1.5)
BILIRUB SERPL-MCNC: 0.5 MG/DL (ref 0–1.2)
BUN SERPL-MCNC: 11 MG/DL (ref 6–20)
BUN/CREAT SERPL: 19 (ref 7–25)
CALCIUM SPEC-SCNC: 7.9 MG/DL (ref 8.6–10.5)
CHLORIDE SERPL-SCNC: 99 MMOL/L (ref 98–107)
CO2 SERPL-SCNC: 24.2 MMOL/L (ref 22–29)
CREAT SERPL-MCNC: 0.58 MG/DL (ref 0.76–1.27)
CRP SERPL-MCNC: 4.87 MG/DL (ref 0–0.5)
DEPRECATED RDW RBC AUTO: 57.1 FL (ref 37–54)
EGFRCR SERPLBLD CKD-EPI 2021: 113.8 ML/MIN/1.73
EOSINOPHIL # BLD AUTO: 0.09 10*3/MM3 (ref 0–0.4)
EOSINOPHIL NFR BLD AUTO: 2.4 % (ref 0.3–6.2)
ERYTHROCYTE [DISTWIDTH] IN BLOOD BY AUTOMATED COUNT: 19.3 % (ref 12.3–15.4)
GLOBULIN UR ELPH-MCNC: 3.6 GM/DL
GLUCOSE BLDC GLUCOMTR-MCNC: 209 MG/DL (ref 70–130)
GLUCOSE BLDC GLUCOMTR-MCNC: 229 MG/DL (ref 70–130)
GLUCOSE BLDC GLUCOMTR-MCNC: 243 MG/DL (ref 70–130)
GLUCOSE BLDC GLUCOMTR-MCNC: 251 MG/DL (ref 70–130)
GLUCOSE SERPL-MCNC: 210 MG/DL (ref 65–99)
HCT VFR BLD AUTO: 25.7 % (ref 37.5–51)
HGB BLD-MCNC: 8.1 G/DL (ref 13–17.7)
IMM GRANULOCYTES # BLD AUTO: 0.01 10*3/MM3 (ref 0–0.05)
IMM GRANULOCYTES NFR BLD AUTO: 0.3 % (ref 0–0.5)
LYMPHOCYTES # BLD AUTO: 0.8 10*3/MM3 (ref 0.7–3.1)
LYMPHOCYTES NFR BLD AUTO: 21.7 % (ref 19.6–45.3)
MCH RBC QN AUTO: 26 PG (ref 26.6–33)
MCHC RBC AUTO-ENTMCNC: 31.5 G/DL (ref 31.5–35.7)
MCV RBC AUTO: 82.4 FL (ref 79–97)
MONOCYTES # BLD AUTO: 0.33 10*3/MM3 (ref 0.1–0.9)
MONOCYTES NFR BLD AUTO: 9 % (ref 5–12)
NEUTROPHILS NFR BLD AUTO: 2.44 10*3/MM3 (ref 1.7–7)
NEUTROPHILS NFR BLD AUTO: 66.3 % (ref 42.7–76)
NRBC BLD AUTO-RTO: 0 /100 WBC (ref 0–0.2)
PLATELET # BLD AUTO: 103 10*3/MM3 (ref 140–450)
PMV BLD AUTO: 10 FL (ref 6–12)
POTASSIUM SERPL-SCNC: 4.4 MMOL/L (ref 3.5–5.2)
PROT SERPL-MCNC: 5.8 G/DL (ref 6–8.5)
RBC # BLD AUTO: 3.12 10*6/MM3 (ref 4.14–5.8)
REF LAB TEST METHOD: NORMAL
SODIUM SERPL-SCNC: 132 MMOL/L (ref 136–145)
WBC NRBC COR # BLD: 3.68 10*3/MM3 (ref 3.4–10.8)

## 2022-10-17 PROCEDURE — 86140 C-REACTIVE PROTEIN: CPT | Performed by: INTERNAL MEDICINE

## 2022-10-17 PROCEDURE — 85025 COMPLETE CBC W/AUTO DIFF WBC: CPT | Performed by: INTERNAL MEDICINE

## 2022-10-17 PROCEDURE — 80053 COMPREHEN METABOLIC PANEL: CPT | Performed by: INTERNAL MEDICINE

## 2022-10-17 PROCEDURE — 99232 SBSQ HOSP IP/OBS MODERATE 35: CPT | Performed by: INTERNAL MEDICINE

## 2022-10-17 PROCEDURE — 97116 GAIT TRAINING THERAPY: CPT

## 2022-10-17 PROCEDURE — 82962 GLUCOSE BLOOD TEST: CPT

## 2022-10-18 ENCOUNTER — APPOINTMENT (OUTPATIENT)
Dept: GENERAL RADIOLOGY | Facility: HOSPITAL | Age: 57
End: 2022-10-18

## 2022-10-18 ENCOUNTER — OUTSIDE FACILITY SERVICE (OUTPATIENT)
Dept: INFECTIOUS DISEASES | Facility: CLINIC | Age: 57
End: 2022-10-18

## 2022-10-18 LAB
ALBUMIN SERPL-MCNC: 2.52 G/DL (ref 3.5–5.2)
ALBUMIN SERPL-MCNC: 2.6 G/DL (ref 3.5–5.2)
ALBUMIN/GLOB SERPL: 0.7 G/DL
ALBUMIN/GLOB SERPL: 0.8 G/DL
ALP SERPL-CCNC: 326 U/L (ref 39–117)
ALP SERPL-CCNC: 363 U/L (ref 39–117)
ALT SERPL W P-5'-P-CCNC: 27 U/L (ref 1–41)
ALT SERPL W P-5'-P-CCNC: 30 U/L (ref 1–41)
ANION GAP SERPL CALCULATED.3IONS-SCNC: 10.1 MMOL/L (ref 5–15)
ANION GAP SERPL CALCULATED.3IONS-SCNC: 9 MMOL/L (ref 5–15)
AST SERPL-CCNC: 30 U/L (ref 1–40)
AST SERPL-CCNC: 40 U/L (ref 1–40)
BASOPHILS # BLD AUTO: 0.01 10*3/MM3 (ref 0–0.2)
BASOPHILS # BLD AUTO: 0.01 10*3/MM3 (ref 0–0.2)
BASOPHILS NFR BLD AUTO: 0.2 % (ref 0–1.5)
BASOPHILS NFR BLD AUTO: 0.3 % (ref 0–1.5)
BILIRUB SERPL-MCNC: 0.6 MG/DL (ref 0–1.2)
BILIRUB SERPL-MCNC: 0.6 MG/DL (ref 0–1.2)
BUN SERPL-MCNC: 12 MG/DL (ref 6–20)
BUN SERPL-MCNC: 13 MG/DL (ref 6–20)
BUN/CREAT SERPL: 16 (ref 7–25)
BUN/CREAT SERPL: 19.7 (ref 7–25)
CALCIUM SPEC-SCNC: 8.2 MG/DL (ref 8.6–10.5)
CALCIUM SPEC-SCNC: 8.2 MG/DL (ref 8.6–10.5)
CHLORIDE SERPL-SCNC: 100 MMOL/L (ref 98–107)
CHLORIDE SERPL-SCNC: 97 MMOL/L (ref 98–107)
CO2 SERPL-SCNC: 23.9 MMOL/L (ref 22–29)
CO2 SERPL-SCNC: 24 MMOL/L (ref 22–29)
CREAT SERPL-MCNC: 0.66 MG/DL (ref 0.76–1.27)
CREAT SERPL-MCNC: 0.75 MG/DL (ref 0.76–1.27)
CRP SERPL-MCNC: 5.92 MG/DL (ref 0–0.5)
CRP SERPL-MCNC: 7 MG/DL (ref 0–0.5)
D-LACTATE SERPL-SCNC: 1.8 MMOL/L (ref 0.5–2)
DEPRECATED RDW RBC AUTO: 56.8 FL (ref 37–54)
DEPRECATED RDW RBC AUTO: 58.4 FL (ref 37–54)
EGFRCR SERPLBLD CKD-EPI 2021: 105.3 ML/MIN/1.73
EGFRCR SERPLBLD CKD-EPI 2021: 109.4 ML/MIN/1.73
EOSINOPHIL # BLD AUTO: 0.07 10*3/MM3 (ref 0–0.4)
EOSINOPHIL # BLD AUTO: 0.11 10*3/MM3 (ref 0–0.4)
EOSINOPHIL NFR BLD AUTO: 1.4 % (ref 0.3–6.2)
EOSINOPHIL NFR BLD AUTO: 3 % (ref 0.3–6.2)
ERYTHROCYTE [DISTWIDTH] IN BLOOD BY AUTOMATED COUNT: 18.9 % (ref 12.3–15.4)
ERYTHROCYTE [DISTWIDTH] IN BLOOD BY AUTOMATED COUNT: 19.2 % (ref 12.3–15.4)
GLOBULIN UR ELPH-MCNC: 3.2 GM/DL
GLOBULIN UR ELPH-MCNC: 3.6 GM/DL
GLUCOSE BLDC GLUCOMTR-MCNC: 121 MG/DL (ref 70–130)
GLUCOSE BLDC GLUCOMTR-MCNC: 213 MG/DL (ref 70–130)
GLUCOSE BLDC GLUCOMTR-MCNC: 237 MG/DL (ref 70–130)
GLUCOSE BLDC GLUCOMTR-MCNC: 316 MG/DL (ref 70–130)
GLUCOSE SERPL-MCNC: 123 MG/DL (ref 65–99)
GLUCOSE SERPL-MCNC: 284 MG/DL (ref 65–99)
HCT VFR BLD AUTO: 26.4 % (ref 37.5–51)
HCT VFR BLD AUTO: 27.4 % (ref 37.5–51)
HGB BLD-MCNC: 8.3 G/DL (ref 13–17.7)
HGB BLD-MCNC: 8.4 G/DL (ref 13–17.7)
IMM GRANULOCYTES # BLD AUTO: 0.01 10*3/MM3 (ref 0–0.05)
IMM GRANULOCYTES # BLD AUTO: 0.01 10*3/MM3 (ref 0–0.05)
IMM GRANULOCYTES NFR BLD AUTO: 0.2 % (ref 0–0.5)
IMM GRANULOCYTES NFR BLD AUTO: 0.3 % (ref 0–0.5)
LYMPHOCYTES # BLD AUTO: 0.87 10*3/MM3 (ref 0.7–3.1)
LYMPHOCYTES # BLD AUTO: 0.91 10*3/MM3 (ref 0.7–3.1)
LYMPHOCYTES NFR BLD AUTO: 17.6 % (ref 19.6–45.3)
LYMPHOCYTES NFR BLD AUTO: 25.1 % (ref 19.6–45.3)
MCH RBC QN AUTO: 25.6 PG (ref 26.6–33)
MCH RBC QN AUTO: 25.8 PG (ref 26.6–33)
MCHC RBC AUTO-ENTMCNC: 30.7 G/DL (ref 31.5–35.7)
MCHC RBC AUTO-ENTMCNC: 31.4 G/DL (ref 31.5–35.7)
MCV RBC AUTO: 81.5 FL (ref 79–97)
MCV RBC AUTO: 84 FL (ref 79–97)
MONOCYTES # BLD AUTO: 0.3 10*3/MM3 (ref 0.1–0.9)
MONOCYTES # BLD AUTO: 0.38 10*3/MM3 (ref 0.1–0.9)
MONOCYTES NFR BLD AUTO: 7.7 % (ref 5–12)
MONOCYTES NFR BLD AUTO: 8.3 % (ref 5–12)
NEUTROPHILS NFR BLD AUTO: 2.28 10*3/MM3 (ref 1.7–7)
NEUTROPHILS NFR BLD AUTO: 3.59 10*3/MM3 (ref 1.7–7)
NEUTROPHILS NFR BLD AUTO: 63 % (ref 42.7–76)
NEUTROPHILS NFR BLD AUTO: 72.9 % (ref 42.7–76)
NRBC BLD AUTO-RTO: 0 /100 WBC (ref 0–0.2)
NRBC BLD AUTO-RTO: 0 /100 WBC (ref 0–0.2)
PLATELET # BLD AUTO: 107 10*3/MM3 (ref 140–450)
PLATELET # BLD AUTO: 109 10*3/MM3 (ref 140–450)
PMV BLD AUTO: 9.1 FL (ref 6–12)
PMV BLD AUTO: 9.5 FL (ref 6–12)
POTASSIUM SERPL-SCNC: 4.1 MMOL/L (ref 3.5–5.2)
POTASSIUM SERPL-SCNC: 4.4 MMOL/L (ref 3.5–5.2)
PROT SERPL-MCNC: 5.7 G/DL (ref 6–8.5)
PROT SERPL-MCNC: 6.2 G/DL (ref 6–8.5)
RBC # BLD AUTO: 3.24 10*6/MM3 (ref 4.14–5.8)
RBC # BLD AUTO: 3.26 10*6/MM3 (ref 4.14–5.8)
SODIUM SERPL-SCNC: 131 MMOL/L (ref 136–145)
SODIUM SERPL-SCNC: 133 MMOL/L (ref 136–145)
WBC NRBC COR # BLD: 3.62 10*3/MM3 (ref 3.4–10.8)
WBC NRBC COR # BLD: 4.93 10*3/MM3 (ref 3.4–10.8)

## 2022-10-18 PROCEDURE — 86140 C-REACTIVE PROTEIN: CPT | Performed by: PHYSICIAN ASSISTANT

## 2022-10-18 PROCEDURE — 80053 COMPREHEN METABOLIC PANEL: CPT | Performed by: INTERNAL MEDICINE

## 2022-10-18 PROCEDURE — 71045 X-RAY EXAM CHEST 1 VIEW: CPT

## 2022-10-18 PROCEDURE — 87186 SC STD MICRODIL/AGAR DIL: CPT | Performed by: PHYSICIAN ASSISTANT

## 2022-10-18 PROCEDURE — 86140 C-REACTIVE PROTEIN: CPT | Performed by: INTERNAL MEDICINE

## 2022-10-18 PROCEDURE — 82962 GLUCOSE BLOOD TEST: CPT

## 2022-10-18 PROCEDURE — 80053 COMPREHEN METABOLIC PANEL: CPT | Performed by: PHYSICIAN ASSISTANT

## 2022-10-18 PROCEDURE — 87040 BLOOD CULTURE FOR BACTERIA: CPT | Performed by: PHYSICIAN ASSISTANT

## 2022-10-18 PROCEDURE — 87150 DNA/RNA AMPLIFIED PROBE: CPT | Performed by: PHYSICIAN ASSISTANT

## 2022-10-18 PROCEDURE — 36415 COLL VENOUS BLD VENIPUNCTURE: CPT | Performed by: PHYSICIAN ASSISTANT

## 2022-10-18 PROCEDURE — 85025 COMPLETE CBC W/AUTO DIFF WBC: CPT | Performed by: PHYSICIAN ASSISTANT

## 2022-10-18 PROCEDURE — 83605 ASSAY OF LACTIC ACID: CPT | Performed by: PHYSICIAN ASSISTANT

## 2022-10-18 PROCEDURE — 85025 COMPLETE CBC W/AUTO DIFF WBC: CPT | Performed by: INTERNAL MEDICINE

## 2022-10-18 PROCEDURE — 87147 CULTURE TYPE IMMUNOLOGIC: CPT | Performed by: PHYSICIAN ASSISTANT

## 2022-10-18 PROCEDURE — 99232 SBSQ HOSP IP/OBS MODERATE 35: CPT | Performed by: INTERNAL MEDICINE

## 2022-10-19 LAB
ALBUMIN SERPL-MCNC: 2.35 G/DL (ref 3.5–5.2)
ALBUMIN/GLOB SERPL: 0.6 G/DL
ALP SERPL-CCNC: 336 U/L (ref 39–117)
ALT SERPL W P-5'-P-CCNC: 26 U/L (ref 1–41)
ANION GAP SERPL CALCULATED.3IONS-SCNC: 8.5 MMOL/L (ref 5–15)
AST SERPL-CCNC: 30 U/L (ref 1–40)
BACTERIA BLD CULT: ABNORMAL
BASOPHILS # BLD AUTO: 0.01 10*3/MM3 (ref 0–0.2)
BASOPHILS NFR BLD AUTO: 0.2 % (ref 0–1.5)
BILIRUB SERPL-MCNC: 0.7 MG/DL (ref 0–1.2)
BILIRUB UR QL STRIP: NEGATIVE
BOTTLE TYPE: ABNORMAL
BUN SERPL-MCNC: 12 MG/DL (ref 6–20)
BUN/CREAT SERPL: 20 (ref 7–25)
CALCIUM SPEC-SCNC: 8.2 MG/DL (ref 8.6–10.5)
CHLORIDE SERPL-SCNC: 101 MMOL/L (ref 98–107)
CLARITY UR: CLEAR
CO2 SERPL-SCNC: 24.5 MMOL/L (ref 22–29)
COLOR UR: YELLOW
CREAT SERPL-MCNC: 0.6 MG/DL (ref 0.76–1.27)
CRP SERPL-MCNC: 8.11 MG/DL (ref 0–0.5)
DEPRECATED RDW RBC AUTO: 57.2 FL (ref 37–54)
EGFRCR SERPLBLD CKD-EPI 2021: 112.6 ML/MIN/1.73
EOSINOPHIL # BLD AUTO: 0.09 10*3/MM3 (ref 0–0.4)
EOSINOPHIL NFR BLD AUTO: 2.1 % (ref 0.3–6.2)
ERYTHROCYTE [DISTWIDTH] IN BLOOD BY AUTOMATED COUNT: 19.2 % (ref 12.3–15.4)
GLOBULIN UR ELPH-MCNC: 3.7 GM/DL
GLUCOSE BLDC GLUCOMTR-MCNC: 150 MG/DL (ref 70–130)
GLUCOSE BLDC GLUCOMTR-MCNC: 231 MG/DL (ref 70–130)
GLUCOSE BLDC GLUCOMTR-MCNC: 239 MG/DL (ref 70–130)
GLUCOSE BLDC GLUCOMTR-MCNC: 290 MG/DL (ref 70–130)
GLUCOSE SERPL-MCNC: 123 MG/DL (ref 65–99)
GLUCOSE UR STRIP-MCNC: ABNORMAL MG/DL
HCT VFR BLD AUTO: 26.8 % (ref 37.5–51)
HGB BLD-MCNC: 8.3 G/DL (ref 13–17.7)
HGB UR QL STRIP.AUTO: NEGATIVE
IMM GRANULOCYTES # BLD AUTO: 0.02 10*3/MM3 (ref 0–0.05)
IMM GRANULOCYTES NFR BLD AUTO: 0.5 % (ref 0–0.5)
KETONES UR QL STRIP: NEGATIVE
LEUKOCYTE ESTERASE UR QL STRIP.AUTO: NEGATIVE
LYMPHOCYTES # BLD AUTO: 1.01 10*3/MM3 (ref 0.7–3.1)
LYMPHOCYTES NFR BLD AUTO: 23.8 % (ref 19.6–45.3)
MCH RBC QN AUTO: 25.5 PG (ref 26.6–33)
MCHC RBC AUTO-ENTMCNC: 31 G/DL (ref 31.5–35.7)
MCV RBC AUTO: 82.5 FL (ref 79–97)
MONOCYTES # BLD AUTO: 0.37 10*3/MM3 (ref 0.1–0.9)
MONOCYTES NFR BLD AUTO: 8.7 % (ref 5–12)
NEUTROPHILS NFR BLD AUTO: 2.74 10*3/MM3 (ref 1.7–7)
NEUTROPHILS NFR BLD AUTO: 64.7 % (ref 42.7–76)
NITRITE UR QL STRIP: NEGATIVE
NRBC BLD AUTO-RTO: 0 /100 WBC (ref 0–0.2)
PH UR STRIP.AUTO: 6.5 [PH] (ref 5–8)
PLATELET # BLD AUTO: 115 10*3/MM3 (ref 140–450)
PMV BLD AUTO: 9.6 FL (ref 6–12)
POTASSIUM SERPL-SCNC: 4.1 MMOL/L (ref 3.5–5.2)
PROT SERPL-MCNC: 6 G/DL (ref 6–8.5)
PROT UR QL STRIP: NEGATIVE
RBC # BLD AUTO: 3.25 10*6/MM3 (ref 4.14–5.8)
SODIUM SERPL-SCNC: 134 MMOL/L (ref 136–145)
SP GR UR STRIP: 1.01 (ref 1–1.03)
UROBILINOGEN UR QL STRIP: ABNORMAL
WBC NRBC COR # BLD: 4.24 10*3/MM3 (ref 3.4–10.8)

## 2022-10-19 PROCEDURE — 86140 C-REACTIVE PROTEIN: CPT | Performed by: INTERNAL MEDICINE

## 2022-10-19 PROCEDURE — 97530 THERAPEUTIC ACTIVITIES: CPT

## 2022-10-19 PROCEDURE — 81003 URINALYSIS AUTO W/O SCOPE: CPT | Performed by: PHYSICIAN ASSISTANT

## 2022-10-19 PROCEDURE — 85025 COMPLETE CBC W/AUTO DIFF WBC: CPT | Performed by: INTERNAL MEDICINE

## 2022-10-19 PROCEDURE — 82962 GLUCOSE BLOOD TEST: CPT

## 2022-10-19 PROCEDURE — 80053 COMPREHEN METABOLIC PANEL: CPT | Performed by: INTERNAL MEDICINE

## 2022-10-19 PROCEDURE — 97116 GAIT TRAINING THERAPY: CPT

## 2022-10-20 ENCOUNTER — OUTSIDE FACILITY SERVICE (OUTPATIENT)
Dept: INFECTIOUS DISEASES | Facility: CLINIC | Age: 57
End: 2022-10-20

## 2022-10-20 LAB
ALBUMIN SERPL-MCNC: 2.24 G/DL (ref 3.5–5.2)
ALBUMIN/GLOB SERPL: 0.6 G/DL
ALP SERPL-CCNC: 325 U/L (ref 39–117)
ALT SERPL W P-5'-P-CCNC: 27 U/L (ref 1–41)
ANION GAP SERPL CALCULATED.3IONS-SCNC: 9 MMOL/L (ref 5–15)
AST SERPL-CCNC: 33 U/L (ref 1–40)
BASOPHILS # BLD AUTO: 0.01 10*3/MM3 (ref 0–0.2)
BASOPHILS NFR BLD AUTO: 0.3 % (ref 0–1.5)
BILIRUB SERPL-MCNC: 0.6 MG/DL (ref 0–1.2)
BUN SERPL-MCNC: 13 MG/DL (ref 6–20)
BUN/CREAT SERPL: 19.7 (ref 7–25)
CALCIUM SPEC-SCNC: 8.3 MG/DL (ref 8.6–10.5)
CHLORIDE SERPL-SCNC: 101 MMOL/L (ref 98–107)
CO2 SERPL-SCNC: 25 MMOL/L (ref 22–29)
CREAT SERPL-MCNC: 0.66 MG/DL (ref 0.76–1.27)
CRP SERPL-MCNC: 7.58 MG/DL (ref 0–0.5)
DEPRECATED RDW RBC AUTO: 57.5 FL (ref 37–54)
EGFRCR SERPLBLD CKD-EPI 2021: 109.4 ML/MIN/1.73
EOSINOPHIL # BLD AUTO: 0.07 10*3/MM3 (ref 0–0.4)
EOSINOPHIL NFR BLD AUTO: 2 % (ref 0.3–6.2)
ERYTHROCYTE [DISTWIDTH] IN BLOOD BY AUTOMATED COUNT: 19 % (ref 12.3–15.4)
GLOBULIN UR ELPH-MCNC: 3.7 GM/DL
GLUCOSE BLDC GLUCOMTR-MCNC: 103 MG/DL (ref 70–130)
GLUCOSE BLDC GLUCOMTR-MCNC: 250 MG/DL (ref 70–130)
GLUCOSE BLDC GLUCOMTR-MCNC: 315 MG/DL (ref 70–130)
GLUCOSE BLDC GLUCOMTR-MCNC: 377 MG/DL (ref 70–130)
GLUCOSE SERPL-MCNC: 146 MG/DL (ref 65–99)
HCT VFR BLD AUTO: 24.9 % (ref 37.5–51)
HGB BLD-MCNC: 7.8 G/DL (ref 13–17.7)
IMM GRANULOCYTES # BLD AUTO: 0.02 10*3/MM3 (ref 0–0.05)
IMM GRANULOCYTES NFR BLD AUTO: 0.6 % (ref 0–0.5)
LYMPHOCYTES # BLD AUTO: 0.94 10*3/MM3 (ref 0.7–3.1)
LYMPHOCYTES NFR BLD AUTO: 26.4 % (ref 19.6–45.3)
MCH RBC QN AUTO: 26.1 PG (ref 26.6–33)
MCHC RBC AUTO-ENTMCNC: 31.3 G/DL (ref 31.5–35.7)
MCV RBC AUTO: 83.3 FL (ref 79–97)
MONOCYTES # BLD AUTO: 0.38 10*3/MM3 (ref 0.1–0.9)
MONOCYTES NFR BLD AUTO: 10.7 % (ref 5–12)
NEUTROPHILS NFR BLD AUTO: 2.14 10*3/MM3 (ref 1.7–7)
NEUTROPHILS NFR BLD AUTO: 60 % (ref 42.7–76)
NRBC BLD AUTO-RTO: 0 /100 WBC (ref 0–0.2)
PLATELET # BLD AUTO: 106 10*3/MM3 (ref 140–450)
PMV BLD AUTO: 9.6 FL (ref 6–12)
POTASSIUM SERPL-SCNC: 4 MMOL/L (ref 3.5–5.2)
PROT SERPL-MCNC: 5.9 G/DL (ref 6–8.5)
RBC # BLD AUTO: 2.99 10*6/MM3 (ref 4.14–5.8)
SODIUM SERPL-SCNC: 135 MMOL/L (ref 136–145)
WBC NRBC COR # BLD: 3.56 10*3/MM3 (ref 3.4–10.8)

## 2022-10-20 PROCEDURE — 82962 GLUCOSE BLOOD TEST: CPT

## 2022-10-20 PROCEDURE — 87147 CULTURE TYPE IMMUNOLOGIC: CPT | Performed by: NURSE PRACTITIONER

## 2022-10-20 PROCEDURE — 85025 COMPLETE CBC W/AUTO DIFF WBC: CPT | Performed by: INTERNAL MEDICINE

## 2022-10-20 PROCEDURE — 86140 C-REACTIVE PROTEIN: CPT | Performed by: INTERNAL MEDICINE

## 2022-10-20 PROCEDURE — 36415 COLL VENOUS BLD VENIPUNCTURE: CPT | Performed by: NURSE PRACTITIONER

## 2022-10-20 PROCEDURE — 99233 SBSQ HOSP IP/OBS HIGH 50: CPT | Performed by: INTERNAL MEDICINE

## 2022-10-20 PROCEDURE — 87040 BLOOD CULTURE FOR BACTERIA: CPT | Performed by: NURSE PRACTITIONER

## 2022-10-20 PROCEDURE — 80053 COMPREHEN METABOLIC PANEL: CPT | Performed by: INTERNAL MEDICINE

## 2022-10-21 ENCOUNTER — OUTSIDE FACILITY SERVICE (OUTPATIENT)
Dept: INFECTIOUS DISEASES | Facility: CLINIC | Age: 57
End: 2022-10-21

## 2022-10-21 LAB
ALBUMIN SERPL-MCNC: 2.38 G/DL (ref 3.5–5.2)
ALBUMIN/GLOB SERPL: 0.7 G/DL
ALP SERPL-CCNC: 342 U/L (ref 39–117)
ALT SERPL W P-5'-P-CCNC: 28 U/L (ref 1–41)
ANION GAP SERPL CALCULATED.3IONS-SCNC: 10.8 MMOL/L (ref 5–15)
AST SERPL-CCNC: 38 U/L (ref 1–40)
BACTERIA SPEC AEROBE CULT: ABNORMAL
BACTERIA SPEC AEROBE CULT: ABNORMAL
BASOPHILS # BLD AUTO: 0.01 10*3/MM3 (ref 0–0.2)
BASOPHILS NFR BLD AUTO: 0.3 % (ref 0–1.5)
BILIRUB SERPL-MCNC: 0.5 MG/DL (ref 0–1.2)
BUN SERPL-MCNC: 13 MG/DL (ref 6–20)
BUN/CREAT SERPL: 20.6 (ref 7–25)
CALCIUM SPEC-SCNC: 8 MG/DL (ref 8.6–10.5)
CHLORIDE SERPL-SCNC: 99 MMOL/L (ref 98–107)
CO2 SERPL-SCNC: 23.2 MMOL/L (ref 22–29)
CREAT SERPL-MCNC: 0.63 MG/DL (ref 0.76–1.27)
CRP SERPL-MCNC: 6.19 MG/DL (ref 0–0.5)
DEPRECATED RDW RBC AUTO: 57.2 FL (ref 37–54)
EGFRCR SERPLBLD CKD-EPI 2021: 110.9 ML/MIN/1.73
EOSINOPHIL # BLD AUTO: 0.07 10*3/MM3 (ref 0–0.4)
EOSINOPHIL NFR BLD AUTO: 1.9 % (ref 0.3–6.2)
ERYTHROCYTE [DISTWIDTH] IN BLOOD BY AUTOMATED COUNT: 18.6 % (ref 12.3–15.4)
GLOBULIN UR ELPH-MCNC: 3.3 GM/DL
GLUCOSE BLDC GLUCOMTR-MCNC: 191 MG/DL (ref 70–130)
GLUCOSE BLDC GLUCOMTR-MCNC: 213 MG/DL (ref 70–130)
GLUCOSE BLDC GLUCOMTR-MCNC: 262 MG/DL (ref 70–130)
GLUCOSE BLDC GLUCOMTR-MCNC: 298 MG/DL (ref 70–130)
GLUCOSE SERPL-MCNC: 224 MG/DL (ref 65–99)
GRAM STN SPEC: ABNORMAL
HCT VFR BLD AUTO: 26.3 % (ref 37.5–51)
HGB BLD-MCNC: 8.3 G/DL (ref 13–17.7)
IMM GRANULOCYTES # BLD AUTO: 0.01 10*3/MM3 (ref 0–0.05)
IMM GRANULOCYTES NFR BLD AUTO: 0.3 % (ref 0–0.5)
ISOLATED FROM: ABNORMAL
ISOLATED FROM: ABNORMAL
LYMPHOCYTES # BLD AUTO: 0.85 10*3/MM3 (ref 0.7–3.1)
LYMPHOCYTES NFR BLD AUTO: 23.5 % (ref 19.6–45.3)
MCH RBC QN AUTO: 26.1 PG (ref 26.6–33)
MCHC RBC AUTO-ENTMCNC: 31.6 G/DL (ref 31.5–35.7)
MCV RBC AUTO: 82.7 FL (ref 79–97)
MONOCYTES # BLD AUTO: 0.29 10*3/MM3 (ref 0.1–0.9)
MONOCYTES NFR BLD AUTO: 8 % (ref 5–12)
NEUTROPHILS NFR BLD AUTO: 2.39 10*3/MM3 (ref 1.7–7)
NEUTROPHILS NFR BLD AUTO: 66 % (ref 42.7–76)
NRBC BLD AUTO-RTO: 0 /100 WBC (ref 0–0.2)
PLATELET # BLD AUTO: 114 10*3/MM3 (ref 140–450)
PMV BLD AUTO: 9.5 FL (ref 6–12)
POTASSIUM SERPL-SCNC: 4.1 MMOL/L (ref 3.5–5.2)
PROT SERPL-MCNC: 5.7 G/DL (ref 6–8.5)
RBC # BLD AUTO: 3.18 10*6/MM3 (ref 4.14–5.8)
SODIUM SERPL-SCNC: 133 MMOL/L (ref 136–145)
WBC NRBC COR # BLD: 3.62 10*3/MM3 (ref 3.4–10.8)

## 2022-10-21 PROCEDURE — 85025 COMPLETE CBC W/AUTO DIFF WBC: CPT | Performed by: INTERNAL MEDICINE

## 2022-10-21 PROCEDURE — 82962 GLUCOSE BLOOD TEST: CPT

## 2022-10-21 PROCEDURE — 80053 COMPREHEN METABOLIC PANEL: CPT | Performed by: INTERNAL MEDICINE

## 2022-10-21 PROCEDURE — 86140 C-REACTIVE PROTEIN: CPT | Performed by: INTERNAL MEDICINE

## 2022-10-22 LAB
ALBUMIN SERPL-MCNC: 2.33 G/DL (ref 3.5–5.2)
ALBUMIN/GLOB SERPL: 0.6 G/DL
ALP SERPL-CCNC: 340 U/L (ref 39–117)
ALT SERPL W P-5'-P-CCNC: 32 U/L (ref 1–41)
ANION GAP SERPL CALCULATED.3IONS-SCNC: 7.2 MMOL/L (ref 5–15)
AST SERPL-CCNC: 40 U/L (ref 1–40)
BACTERIA SPEC AEROBE CULT: ABNORMAL
BASOPHILS # BLD AUTO: 0.01 10*3/MM3 (ref 0–0.2)
BASOPHILS NFR BLD AUTO: 0.3 % (ref 0–1.5)
BILIRUB SERPL-MCNC: 0.5 MG/DL (ref 0–1.2)
BUN SERPL-MCNC: 10 MG/DL (ref 6–20)
BUN/CREAT SERPL: 16.1 (ref 7–25)
CALCIUM SPEC-SCNC: 8.2 MG/DL (ref 8.6–10.5)
CHLORIDE SERPL-SCNC: 103 MMOL/L (ref 98–107)
CO2 SERPL-SCNC: 24.8 MMOL/L (ref 22–29)
CREAT SERPL-MCNC: 0.62 MG/DL (ref 0.76–1.27)
CRP SERPL-MCNC: 5.66 MG/DL (ref 0–0.5)
DEPRECATED RDW RBC AUTO: 56.5 FL (ref 37–54)
EGFRCR SERPLBLD CKD-EPI 2021: 111.5 ML/MIN/1.73
EOSINOPHIL # BLD AUTO: 0.09 10*3/MM3 (ref 0–0.4)
EOSINOPHIL NFR BLD AUTO: 2.7 % (ref 0.3–6.2)
ERYTHROCYTE [DISTWIDTH] IN BLOOD BY AUTOMATED COUNT: 18.6 % (ref 12.3–15.4)
GLOBULIN UR ELPH-MCNC: 3.7 GM/DL
GLUCOSE BLDC GLUCOMTR-MCNC: 183 MG/DL (ref 70–130)
GLUCOSE BLDC GLUCOMTR-MCNC: 183 MG/DL (ref 70–130)
GLUCOSE BLDC GLUCOMTR-MCNC: 239 MG/DL (ref 70–130)
GLUCOSE BLDC GLUCOMTR-MCNC: 95 MG/DL (ref 70–130)
GLUCOSE SERPL-MCNC: 110 MG/DL (ref 65–99)
GRAM STN SPEC: ABNORMAL
HCT VFR BLD AUTO: 27.2 % (ref 37.5–51)
HGB BLD-MCNC: 8.7 G/DL (ref 13–17.7)
IMM GRANULOCYTES # BLD AUTO: 0.01 10*3/MM3 (ref 0–0.05)
IMM GRANULOCYTES NFR BLD AUTO: 0.3 % (ref 0–0.5)
ISOLATED FROM: ABNORMAL
LYMPHOCYTES # BLD AUTO: 0.87 10*3/MM3 (ref 0.7–3.1)
LYMPHOCYTES NFR BLD AUTO: 25.7 % (ref 19.6–45.3)
MCH RBC QN AUTO: 26.3 PG (ref 26.6–33)
MCHC RBC AUTO-ENTMCNC: 32 G/DL (ref 31.5–35.7)
MCV RBC AUTO: 82.2 FL (ref 79–97)
MONOCYTES # BLD AUTO: 0.29 10*3/MM3 (ref 0.1–0.9)
MONOCYTES NFR BLD AUTO: 8.6 % (ref 5–12)
NEUTROPHILS NFR BLD AUTO: 2.11 10*3/MM3 (ref 1.7–7)
NEUTROPHILS NFR BLD AUTO: 62.4 % (ref 42.7–76)
NRBC BLD AUTO-RTO: 0 /100 WBC (ref 0–0.2)
PLATELET # BLD AUTO: 119 10*3/MM3 (ref 140–450)
PMV BLD AUTO: 9.7 FL (ref 6–12)
POTASSIUM SERPL-SCNC: 4.2 MMOL/L (ref 3.5–5.2)
PROT SERPL-MCNC: 6 G/DL (ref 6–8.5)
RBC # BLD AUTO: 3.31 10*6/MM3 (ref 4.14–5.8)
SODIUM SERPL-SCNC: 135 MMOL/L (ref 136–145)
WBC NRBC COR # BLD: 3.38 10*3/MM3 (ref 3.4–10.8)

## 2022-10-22 PROCEDURE — 85025 COMPLETE CBC W/AUTO DIFF WBC: CPT | Performed by: INTERNAL MEDICINE

## 2022-10-22 PROCEDURE — 87147 CULTURE TYPE IMMUNOLOGIC: CPT | Performed by: NURSE PRACTITIONER

## 2022-10-22 PROCEDURE — 80053 COMPREHEN METABOLIC PANEL: CPT | Performed by: INTERNAL MEDICINE

## 2022-10-22 PROCEDURE — 36415 COLL VENOUS BLD VENIPUNCTURE: CPT | Performed by: NURSE PRACTITIONER

## 2022-10-22 PROCEDURE — 86140 C-REACTIVE PROTEIN: CPT | Performed by: INTERNAL MEDICINE

## 2022-10-22 PROCEDURE — 87040 BLOOD CULTURE FOR BACTERIA: CPT | Performed by: NURSE PRACTITIONER

## 2022-10-22 PROCEDURE — 87186 SC STD MICRODIL/AGAR DIL: CPT | Performed by: NURSE PRACTITIONER

## 2022-10-22 PROCEDURE — 82962 GLUCOSE BLOOD TEST: CPT

## 2022-10-23 LAB
ALBUMIN SERPL-MCNC: 2.41 G/DL (ref 3.5–5.2)
ALBUMIN/GLOB SERPL: 0.7 G/DL
ALP SERPL-CCNC: 331 U/L (ref 39–117)
ALT SERPL W P-5'-P-CCNC: 31 U/L (ref 1–41)
ANION GAP SERPL CALCULATED.3IONS-SCNC: 8.1 MMOL/L (ref 5–15)
AST SERPL-CCNC: 37 U/L (ref 1–40)
BACTERIA SPEC AEROBE CULT: ABNORMAL
BASOPHILS # BLD AUTO: 0.01 10*3/MM3 (ref 0–0.2)
BASOPHILS NFR BLD AUTO: 0.3 % (ref 0–1.5)
BILIRUB SERPL-MCNC: 0.5 MG/DL (ref 0–1.2)
BUN SERPL-MCNC: 10 MG/DL (ref 6–20)
BUN/CREAT SERPL: 18.9 (ref 7–25)
CALCIUM SPEC-SCNC: 8.2 MG/DL (ref 8.6–10.5)
CHLORIDE SERPL-SCNC: 100 MMOL/L (ref 98–107)
CO2 SERPL-SCNC: 25.9 MMOL/L (ref 22–29)
CREAT SERPL-MCNC: 0.53 MG/DL (ref 0.76–1.27)
CRP SERPL-MCNC: 5.08 MG/DL (ref 0–0.5)
DEPRECATED RDW RBC AUTO: 58.4 FL (ref 37–54)
EGFRCR SERPLBLD CKD-EPI 2021: 116.9 ML/MIN/1.73
EOSINOPHIL # BLD AUTO: 0.07 10*3/MM3 (ref 0–0.4)
EOSINOPHIL NFR BLD AUTO: 2.2 % (ref 0.3–6.2)
ERYTHROCYTE [DISTWIDTH] IN BLOOD BY AUTOMATED COUNT: 18.8 % (ref 12.3–15.4)
GLOBULIN UR ELPH-MCNC: 3.4 GM/DL
GLUCOSE BLDC GLUCOMTR-MCNC: 186 MG/DL (ref 70–130)
GLUCOSE BLDC GLUCOMTR-MCNC: 188 MG/DL (ref 70–130)
GLUCOSE BLDC GLUCOMTR-MCNC: 279 MG/DL (ref 70–130)
GLUCOSE BLDC GLUCOMTR-MCNC: 91 MG/DL (ref 70–130)
GLUCOSE SERPL-MCNC: 140 MG/DL (ref 65–99)
GRAM STN SPEC: ABNORMAL
HCT VFR BLD AUTO: 27.9 % (ref 37.5–51)
HGB BLD-MCNC: 8.6 G/DL (ref 13–17.7)
IMM GRANULOCYTES # BLD AUTO: 0.01 10*3/MM3 (ref 0–0.05)
IMM GRANULOCYTES NFR BLD AUTO: 0.3 % (ref 0–0.5)
ISOLATED FROM: ABNORMAL
LYMPHOCYTES # BLD AUTO: 0.8 10*3/MM3 (ref 0.7–3.1)
LYMPHOCYTES NFR BLD AUTO: 25.3 % (ref 19.6–45.3)
MCH RBC QN AUTO: 26.1 PG (ref 26.6–33)
MCHC RBC AUTO-ENTMCNC: 30.8 G/DL (ref 31.5–35.7)
MCV RBC AUTO: 84.5 FL (ref 79–97)
MONOCYTES # BLD AUTO: 0.3 10*3/MM3 (ref 0.1–0.9)
MONOCYTES NFR BLD AUTO: 9.5 % (ref 5–12)
NEUTROPHILS NFR BLD AUTO: 1.97 10*3/MM3 (ref 1.7–7)
NEUTROPHILS NFR BLD AUTO: 62.4 % (ref 42.7–76)
NRBC BLD AUTO-RTO: 0 /100 WBC (ref 0–0.2)
PLATELET # BLD AUTO: 109 10*3/MM3 (ref 140–450)
PMV BLD AUTO: 9.4 FL (ref 6–12)
POTASSIUM SERPL-SCNC: 4.2 MMOL/L (ref 3.5–5.2)
PROT SERPL-MCNC: 5.8 G/DL (ref 6–8.5)
RBC # BLD AUTO: 3.3 10*6/MM3 (ref 4.14–5.8)
SODIUM SERPL-SCNC: 134 MMOL/L (ref 136–145)
WBC NRBC COR # BLD: 3.16 10*3/MM3 (ref 3.4–10.8)

## 2022-10-23 PROCEDURE — 80053 COMPREHEN METABOLIC PANEL: CPT | Performed by: INTERNAL MEDICINE

## 2022-10-23 PROCEDURE — 82962 GLUCOSE BLOOD TEST: CPT

## 2022-10-23 PROCEDURE — 85025 COMPLETE CBC W/AUTO DIFF WBC: CPT | Performed by: INTERNAL MEDICINE

## 2022-10-23 PROCEDURE — 86140 C-REACTIVE PROTEIN: CPT | Performed by: INTERNAL MEDICINE

## 2022-10-24 ENCOUNTER — OUTSIDE FACILITY SERVICE (OUTPATIENT)
Dept: INFECTIOUS DISEASES | Facility: CLINIC | Age: 57
End: 2022-10-24

## 2022-10-24 ENCOUNTER — APPOINTMENT (OUTPATIENT)
Dept: CARDIOLOGY | Facility: HOSPITAL | Age: 57
End: 2022-10-24

## 2022-10-24 LAB
ALBUMIN SERPL-MCNC: 2.23 G/DL (ref 3.5–5.2)
ALBUMIN/GLOB SERPL: 0.6 G/DL
ALP SERPL-CCNC: 359 U/L (ref 39–117)
ALT SERPL W P-5'-P-CCNC: 38 U/L (ref 1–41)
ANION GAP SERPL CALCULATED.3IONS-SCNC: 6.5 MMOL/L (ref 5–15)
AST SERPL-CCNC: 52 U/L (ref 1–40)
BASOPHILS # BLD AUTO: 0.01 10*3/MM3 (ref 0–0.2)
BASOPHILS NFR BLD AUTO: 0.3 % (ref 0–1.5)
BILIRUB SERPL-MCNC: 0.4 MG/DL (ref 0–1.2)
BUN SERPL-MCNC: 11 MG/DL (ref 6–20)
BUN/CREAT SERPL: 17.7 (ref 7–25)
CALCIUM SPEC-SCNC: 8.2 MG/DL (ref 8.6–10.5)
CHLORIDE SERPL-SCNC: 101 MMOL/L (ref 98–107)
CO2 SERPL-SCNC: 26.5 MMOL/L (ref 22–29)
CREAT SERPL-MCNC: 0.62 MG/DL (ref 0.76–1.27)
CRP SERPL-MCNC: 4.78 MG/DL (ref 0–0.5)
DEPRECATED RDW RBC AUTO: 57.2 FL (ref 37–54)
EGFRCR SERPLBLD CKD-EPI 2021: 111.5 ML/MIN/1.73
EOSINOPHIL # BLD AUTO: 0.12 10*3/MM3 (ref 0–0.4)
EOSINOPHIL NFR BLD AUTO: 3.3 % (ref 0.3–6.2)
ERYTHROCYTE [DISTWIDTH] IN BLOOD BY AUTOMATED COUNT: 18.7 % (ref 12.3–15.4)
GLOBULIN UR ELPH-MCNC: 3.6 GM/DL
GLUCOSE BLDC GLUCOMTR-MCNC: 108 MG/DL (ref 70–130)
GLUCOSE BLDC GLUCOMTR-MCNC: 238 MG/DL (ref 70–130)
GLUCOSE BLDC GLUCOMTR-MCNC: 259 MG/DL (ref 70–130)
GLUCOSE BLDC GLUCOMTR-MCNC: 265 MG/DL (ref 70–130)
GLUCOSE SERPL-MCNC: 189 MG/DL (ref 65–99)
HCT VFR BLD AUTO: 26.1 % (ref 37.5–51)
HGB BLD-MCNC: 8 G/DL (ref 13–17.7)
IMM GRANULOCYTES # BLD AUTO: 0.02 10*3/MM3 (ref 0–0.05)
IMM GRANULOCYTES NFR BLD AUTO: 0.5 % (ref 0–0.5)
LYMPHOCYTES # BLD AUTO: 0.95 10*3/MM3 (ref 0.7–3.1)
LYMPHOCYTES NFR BLD AUTO: 26 % (ref 19.6–45.3)
MCH RBC QN AUTO: 25.6 PG (ref 26.6–33)
MCHC RBC AUTO-ENTMCNC: 30.7 G/DL (ref 31.5–35.7)
MCV RBC AUTO: 83.7 FL (ref 79–97)
MONOCYTES # BLD AUTO: 0.36 10*3/MM3 (ref 0.1–0.9)
MONOCYTES NFR BLD AUTO: 9.9 % (ref 5–12)
NEUTROPHILS NFR BLD AUTO: 2.19 10*3/MM3 (ref 1.7–7)
NEUTROPHILS NFR BLD AUTO: 60 % (ref 42.7–76)
NRBC BLD AUTO-RTO: 0 /100 WBC (ref 0–0.2)
PLATELET # BLD AUTO: 111 10*3/MM3 (ref 140–450)
PMV BLD AUTO: 9.6 FL (ref 6–12)
POTASSIUM SERPL-SCNC: 4.1 MMOL/L (ref 3.5–5.2)
PROT SERPL-MCNC: 5.8 G/DL (ref 6–8.5)
RBC # BLD AUTO: 3.12 10*6/MM3 (ref 4.14–5.8)
SODIUM SERPL-SCNC: 134 MMOL/L (ref 136–145)
WBC NRBC COR # BLD: 3.65 10*3/MM3 (ref 3.4–10.8)

## 2022-10-24 PROCEDURE — 97530 THERAPEUTIC ACTIVITIES: CPT

## 2022-10-24 PROCEDURE — 93306 TTE W/DOPPLER COMPLETE: CPT | Performed by: INTERNAL MEDICINE

## 2022-10-24 PROCEDURE — 86140 C-REACTIVE PROTEIN: CPT | Performed by: INTERNAL MEDICINE

## 2022-10-24 PROCEDURE — 85025 COMPLETE CBC W/AUTO DIFF WBC: CPT | Performed by: INTERNAL MEDICINE

## 2022-10-24 PROCEDURE — 82962 GLUCOSE BLOOD TEST: CPT

## 2022-10-24 PROCEDURE — 93306 TTE W/DOPPLER COMPLETE: CPT

## 2022-10-24 PROCEDURE — 80053 COMPREHEN METABOLIC PANEL: CPT | Performed by: INTERNAL MEDICINE

## 2022-10-24 PROCEDURE — 97116 GAIT TRAINING THERAPY: CPT

## 2022-10-24 PROCEDURE — 87040 BLOOD CULTURE FOR BACTERIA: CPT | Performed by: NURSE PRACTITIONER

## 2022-10-24 PROCEDURE — 36415 COLL VENOUS BLD VENIPUNCTURE: CPT | Performed by: INTERNAL MEDICINE

## 2022-10-24 PROCEDURE — 99233 SBSQ HOSP IP/OBS HIGH 50: CPT | Performed by: INTERNAL MEDICINE

## 2022-10-25 ENCOUNTER — OUTSIDE FACILITY SERVICE (OUTPATIENT)
Dept: INFECTIOUS DISEASES | Facility: CLINIC | Age: 57
End: 2022-10-25

## 2022-10-25 LAB
ALBUMIN SERPL-MCNC: 2.33 G/DL (ref 3.5–5.2)
ALBUMIN/GLOB SERPL: 0.7 G/DL
ALP SERPL-CCNC: 365 U/L (ref 39–117)
ALT SERPL W P-5'-P-CCNC: 39 U/L (ref 1–41)
ANION GAP SERPL CALCULATED.3IONS-SCNC: 8.3 MMOL/L (ref 5–15)
AST SERPL-CCNC: 50 U/L (ref 1–40)
BACTERIA SPEC AEROBE CULT: ABNORMAL
BASOPHILS # BLD AUTO: 0.02 10*3/MM3 (ref 0–0.2)
BASOPHILS NFR BLD AUTO: 0.5 % (ref 0–1.5)
BH CV ECHO MEAS - ACS: 2.9 CM
BH CV ECHO MEAS - AO MAX PG: 9.6 MMHG
BH CV ECHO MEAS - AO MEAN PG: 4 MMHG
BH CV ECHO MEAS - AO ROOT DIAM: 3.9 CM
BH CV ECHO MEAS - AO V2 MAX: 155 CM/SEC
BH CV ECHO MEAS - AO V2 VTI: 27 CM
BH CV ECHO MEAS - EDV(CUBED): 153.6 ML
BH CV ECHO MEAS - ESV(CUBED): 35 ML
BH CV ECHO MEAS - FS: 38.9 %
BH CV ECHO MEAS - IVS/LVPW: 1.12 CM
BH CV ECHO MEAS - IVSD: 1.32 CM
BH CV ECHO MEAS - LA DIMENSION: 4.8 CM
BH CV ECHO MEAS - LV MASS(C)D: 273.8 GRAMS
BH CV ECHO MEAS - LVIDD: 5.4 CM
BH CV ECHO MEAS - LVIDS: 3.3 CM
BH CV ECHO MEAS - LVOT AREA: 3.1 CM2
BH CV ECHO MEAS - LVOT DIAM: 2 CM
BH CV ECHO MEAS - LVPWD: 1.17 CM
BH CV ECHO MEAS - MV A MAX VEL: 110 CM/SEC
BH CV ECHO MEAS - MV E MAX VEL: 83.1 CM/SEC
BH CV ECHO MEAS - MV E/A: 0.76
BH CV ECHO MEAS - PA ACC TIME: 0.09 SEC
BH CV ECHO MEAS - PA PR(ACCEL): 39.4 MMHG
BH CV ECHO MEAS - TAPSE (>1.6): 2.9 CM
BILIRUB SERPL-MCNC: 0.5 MG/DL (ref 0–1.2)
BUN SERPL-MCNC: 12 MG/DL (ref 6–20)
BUN/CREAT SERPL: 17.9 (ref 7–25)
CALCIUM SPEC-SCNC: 8.1 MG/DL (ref 8.6–10.5)
CHLORIDE SERPL-SCNC: 99 MMOL/L (ref 98–107)
CO2 SERPL-SCNC: 24.7 MMOL/L (ref 22–29)
CREAT SERPL-MCNC: 0.67 MG/DL (ref 0.76–1.27)
CRP SERPL-MCNC: 5.59 MG/DL (ref 0–0.5)
DEPRECATED RDW RBC AUTO: 56.9 FL (ref 37–54)
EGFRCR SERPLBLD CKD-EPI 2021: 108.9 ML/MIN/1.73
EOSINOPHIL # BLD AUTO: 0.08 10*3/MM3 (ref 0–0.4)
EOSINOPHIL NFR BLD AUTO: 2 % (ref 0.3–6.2)
ERYTHROCYTE [DISTWIDTH] IN BLOOD BY AUTOMATED COUNT: 18.9 % (ref 12.3–15.4)
GLOBULIN UR ELPH-MCNC: 3.5 GM/DL
GLUCOSE BLDC GLUCOMTR-MCNC: 182 MG/DL (ref 70–130)
GLUCOSE BLDC GLUCOMTR-MCNC: 226 MG/DL (ref 70–130)
GLUCOSE BLDC GLUCOMTR-MCNC: 255 MG/DL (ref 70–130)
GLUCOSE BLDC GLUCOMTR-MCNC: 319 MG/DL (ref 70–130)
GLUCOSE SERPL-MCNC: 210 MG/DL (ref 65–99)
GRAM STN SPEC: ABNORMAL
GRAM STN SPEC: ABNORMAL
HCT VFR BLD AUTO: 25.3 % (ref 37.5–51)
HGB BLD-MCNC: 7.8 G/DL (ref 13–17.7)
IMM GRANULOCYTES # BLD AUTO: 0.01 10*3/MM3 (ref 0–0.05)
IMM GRANULOCYTES NFR BLD AUTO: 0.2 % (ref 0–0.5)
ISOLATED FROM: ABNORMAL
LYMPHOCYTES # BLD AUTO: 0.8 10*3/MM3 (ref 0.7–3.1)
LYMPHOCYTES NFR BLD AUTO: 19.6 % (ref 19.6–45.3)
MAXIMAL PREDICTED HEART RATE: 163 BPM
MCH RBC QN AUTO: 25.5 PG (ref 26.6–33)
MCHC RBC AUTO-ENTMCNC: 30.8 G/DL (ref 31.5–35.7)
MCV RBC AUTO: 82.7 FL (ref 79–97)
MONOCYTES # BLD AUTO: 0.37 10*3/MM3 (ref 0.1–0.9)
MONOCYTES NFR BLD AUTO: 9.1 % (ref 5–12)
NEUTROPHILS NFR BLD AUTO: 2.8 10*3/MM3 (ref 1.7–7)
NEUTROPHILS NFR BLD AUTO: 68.6 % (ref 42.7–76)
NRBC BLD AUTO-RTO: 0 /100 WBC (ref 0–0.2)
PLATELET # BLD AUTO: 112 10*3/MM3 (ref 140–450)
PMV BLD AUTO: 10 FL (ref 6–12)
POTASSIUM SERPL-SCNC: 4 MMOL/L (ref 3.5–5.2)
PROT SERPL-MCNC: 5.8 G/DL (ref 6–8.5)
RBC # BLD AUTO: 3.06 10*6/MM3 (ref 4.14–5.8)
SODIUM SERPL-SCNC: 132 MMOL/L (ref 136–145)
STRESS TARGET HR: 139 BPM
WBC NRBC COR # BLD: 4.08 10*3/MM3 (ref 3.4–10.8)

## 2022-10-25 PROCEDURE — 86140 C-REACTIVE PROTEIN: CPT | Performed by: INTERNAL MEDICINE

## 2022-10-25 PROCEDURE — 36415 COLL VENOUS BLD VENIPUNCTURE: CPT | Performed by: INTERNAL MEDICINE

## 2022-10-25 PROCEDURE — 82962 GLUCOSE BLOOD TEST: CPT

## 2022-10-25 PROCEDURE — 85025 COMPLETE CBC W/AUTO DIFF WBC: CPT | Performed by: INTERNAL MEDICINE

## 2022-10-25 PROCEDURE — 80053 COMPREHEN METABOLIC PANEL: CPT | Performed by: INTERNAL MEDICINE

## 2022-10-25 PROCEDURE — 99232 SBSQ HOSP IP/OBS MODERATE 35: CPT | Performed by: INTERNAL MEDICINE

## 2022-10-26 ENCOUNTER — OUTSIDE FACILITY SERVICE (OUTPATIENT)
Dept: INFECTIOUS DISEASES | Facility: CLINIC | Age: 57
End: 2022-10-26

## 2022-10-26 LAB
ALBUMIN SERPL-MCNC: 2.16 G/DL (ref 3.5–5.2)
ALBUMIN/GLOB SERPL: 0.6 G/DL
ALP SERPL-CCNC: 337 U/L (ref 39–117)
ALT SERPL W P-5'-P-CCNC: 36 U/L (ref 1–41)
ANION GAP SERPL CALCULATED.3IONS-SCNC: 6.4 MMOL/L (ref 5–15)
AST SERPL-CCNC: 40 U/L (ref 1–40)
BASOPHILS # BLD AUTO: 0.01 10*3/MM3 (ref 0–0.2)
BASOPHILS NFR BLD AUTO: 0.3 % (ref 0–1.5)
BILIRUB SERPL-MCNC: 0.4 MG/DL (ref 0–1.2)
BUN SERPL-MCNC: 10 MG/DL (ref 6–20)
BUN/CREAT SERPL: 14.1 (ref 7–25)
CALCIUM SPEC-SCNC: 8.1 MG/DL (ref 8.6–10.5)
CHLORIDE SERPL-SCNC: 101 MMOL/L (ref 98–107)
CK SERPL-CCNC: 20 U/L (ref 20–200)
CO2 SERPL-SCNC: 26.6 MMOL/L (ref 22–29)
CREAT SERPL-MCNC: 0.71 MG/DL (ref 0.76–1.27)
CRP SERPL-MCNC: 5.48 MG/DL (ref 0–0.5)
DEPRECATED RDW RBC AUTO: 57.7 FL (ref 37–54)
EGFRCR SERPLBLD CKD-EPI 2021: 107 ML/MIN/1.73
EOSINOPHIL # BLD AUTO: 0.1 10*3/MM3 (ref 0–0.4)
EOSINOPHIL NFR BLD AUTO: 3.2 % (ref 0.3–6.2)
ERYTHROCYTE [DISTWIDTH] IN BLOOD BY AUTOMATED COUNT: 18.8 % (ref 12.3–15.4)
GLOBULIN UR ELPH-MCNC: 3.4 GM/DL
GLUCOSE BLDC GLUCOMTR-MCNC: 168 MG/DL (ref 70–130)
GLUCOSE BLDC GLUCOMTR-MCNC: 198 MG/DL (ref 70–130)
GLUCOSE BLDC GLUCOMTR-MCNC: 213 MG/DL (ref 70–130)
GLUCOSE BLDC GLUCOMTR-MCNC: 242 MG/DL (ref 70–130)
GLUCOSE SERPL-MCNC: 211 MG/DL (ref 65–99)
HCT VFR BLD AUTO: 25.1 % (ref 37.5–51)
HGB BLD-MCNC: 7.7 G/DL (ref 13–17.7)
IMM GRANULOCYTES # BLD AUTO: 0.01 10*3/MM3 (ref 0–0.05)
IMM GRANULOCYTES NFR BLD AUTO: 0.3 % (ref 0–0.5)
LYMPHOCYTES # BLD AUTO: 0.72 10*3/MM3 (ref 0.7–3.1)
LYMPHOCYTES NFR BLD AUTO: 23.2 % (ref 19.6–45.3)
MCH RBC QN AUTO: 25.9 PG (ref 26.6–33)
MCHC RBC AUTO-ENTMCNC: 30.7 G/DL (ref 31.5–35.7)
MCV RBC AUTO: 84.5 FL (ref 79–97)
MONOCYTES # BLD AUTO: 0.31 10*3/MM3 (ref 0.1–0.9)
MONOCYTES NFR BLD AUTO: 10 % (ref 5–12)
NEUTROPHILS NFR BLD AUTO: 1.96 10*3/MM3 (ref 1.7–7)
NEUTROPHILS NFR BLD AUTO: 63 % (ref 42.7–76)
NRBC BLD AUTO-RTO: 0 /100 WBC (ref 0–0.2)
PLATELET # BLD AUTO: 95 10*3/MM3 (ref 140–450)
PMV BLD AUTO: 9.6 FL (ref 6–12)
POTASSIUM SERPL-SCNC: 3.7 MMOL/L (ref 3.5–5.2)
PROT SERPL-MCNC: 5.6 G/DL (ref 6–8.5)
RBC # BLD AUTO: 2.97 10*6/MM3 (ref 4.14–5.8)
SODIUM SERPL-SCNC: 134 MMOL/L (ref 136–145)
WBC NRBC COR # BLD: 3.11 10*3/MM3 (ref 3.4–10.8)

## 2022-10-26 PROCEDURE — 82962 GLUCOSE BLOOD TEST: CPT

## 2022-10-26 PROCEDURE — 80053 COMPREHEN METABOLIC PANEL: CPT | Performed by: INTERNAL MEDICINE

## 2022-10-26 PROCEDURE — 36415 COLL VENOUS BLD VENIPUNCTURE: CPT | Performed by: INTERNAL MEDICINE

## 2022-10-26 PROCEDURE — 85025 COMPLETE CBC W/AUTO DIFF WBC: CPT | Performed by: INTERNAL MEDICINE

## 2022-10-26 PROCEDURE — 99232 SBSQ HOSP IP/OBS MODERATE 35: CPT | Performed by: INTERNAL MEDICINE

## 2022-10-26 PROCEDURE — 86140 C-REACTIVE PROTEIN: CPT | Performed by: INTERNAL MEDICINE

## 2022-10-26 PROCEDURE — 82550 ASSAY OF CK (CPK): CPT | Performed by: INTERNAL MEDICINE

## 2022-10-27 ENCOUNTER — OUTSIDE FACILITY SERVICE (OUTPATIENT)
Dept: INFECTIOUS DISEASES | Facility: CLINIC | Age: 57
End: 2022-10-27

## 2022-10-27 LAB
ALBUMIN SERPL-MCNC: 2.32 G/DL (ref 3.5–5.2)
ALBUMIN/GLOB SERPL: 0.7 G/DL
ALP SERPL-CCNC: 335 U/L (ref 39–117)
ALT SERPL W P-5'-P-CCNC: 32 U/L (ref 1–41)
ANION GAP SERPL CALCULATED.3IONS-SCNC: 7.4 MMOL/L (ref 5–15)
AST SERPL-CCNC: 33 U/L (ref 1–40)
BACTERIA SPEC AEROBE CULT: NORMAL
BASOPHILS # BLD AUTO: 0.01 10*3/MM3 (ref 0–0.2)
BASOPHILS NFR BLD AUTO: 0.3 % (ref 0–1.5)
BILIRUB SERPL-MCNC: 0.4 MG/DL (ref 0–1.2)
BUN SERPL-MCNC: 10 MG/DL (ref 6–20)
BUN/CREAT SERPL: 16.7 (ref 7–25)
CALCIUM SPEC-SCNC: 8.1 MG/DL (ref 8.6–10.5)
CHLORIDE SERPL-SCNC: 102 MMOL/L (ref 98–107)
CO2 SERPL-SCNC: 24.6 MMOL/L (ref 22–29)
CREAT SERPL-MCNC: 0.6 MG/DL (ref 0.76–1.27)
CRP SERPL-MCNC: 5.22 MG/DL (ref 0–0.5)
DEPRECATED RDW RBC AUTO: 56.4 FL (ref 37–54)
EGFRCR SERPLBLD CKD-EPI 2021: 112.6 ML/MIN/1.73
EOSINOPHIL # BLD AUTO: 0.11 10*3/MM3 (ref 0–0.4)
EOSINOPHIL NFR BLD AUTO: 2.8 % (ref 0.3–6.2)
ERYTHROCYTE [DISTWIDTH] IN BLOOD BY AUTOMATED COUNT: 18.4 % (ref 12.3–15.4)
GLOBULIN UR ELPH-MCNC: 3.4 GM/DL
GLUCOSE BLDC GLUCOMTR-MCNC: 170 MG/DL (ref 70–130)
GLUCOSE BLDC GLUCOMTR-MCNC: 196 MG/DL (ref 70–130)
GLUCOSE BLDC GLUCOMTR-MCNC: 290 MG/DL (ref 70–130)
GLUCOSE BLDC GLUCOMTR-MCNC: 335 MG/DL (ref 70–130)
GLUCOSE BLDC GLUCOMTR-MCNC: 73 MG/DL (ref 70–130)
GLUCOSE SERPL-MCNC: 138 MG/DL (ref 65–99)
HCT VFR BLD AUTO: 27.1 % (ref 37.5–51)
HGB BLD-MCNC: 8.5 G/DL (ref 13–17.7)
IMM GRANULOCYTES # BLD AUTO: 0.02 10*3/MM3 (ref 0–0.05)
IMM GRANULOCYTES NFR BLD AUTO: 0.5 % (ref 0–0.5)
LYMPHOCYTES # BLD AUTO: 0.84 10*3/MM3 (ref 0.7–3.1)
LYMPHOCYTES NFR BLD AUTO: 21.4 % (ref 19.6–45.3)
MCH RBC QN AUTO: 26.2 PG (ref 26.6–33)
MCHC RBC AUTO-ENTMCNC: 31.4 G/DL (ref 31.5–35.7)
MCV RBC AUTO: 83.4 FL (ref 79–97)
MONOCYTES # BLD AUTO: 0.3 10*3/MM3 (ref 0.1–0.9)
MONOCYTES NFR BLD AUTO: 7.7 % (ref 5–12)
NEUTROPHILS NFR BLD AUTO: 2.64 10*3/MM3 (ref 1.7–7)
NEUTROPHILS NFR BLD AUTO: 67.3 % (ref 42.7–76)
NRBC BLD AUTO-RTO: 0 /100 WBC (ref 0–0.2)
PLATELET # BLD AUTO: 110 10*3/MM3 (ref 140–450)
PMV BLD AUTO: 9.9 FL (ref 6–12)
POTASSIUM SERPL-SCNC: 3.9 MMOL/L (ref 3.5–5.2)
PROT SERPL-MCNC: 5.7 G/DL (ref 6–8.5)
RBC # BLD AUTO: 3.25 10*6/MM3 (ref 4.14–5.8)
SODIUM SERPL-SCNC: 134 MMOL/L (ref 136–145)
WBC NRBC COR # BLD: 3.92 10*3/MM3 (ref 3.4–10.8)

## 2022-10-27 PROCEDURE — 97530 THERAPEUTIC ACTIVITIES: CPT

## 2022-10-27 PROCEDURE — 97110 THERAPEUTIC EXERCISES: CPT

## 2022-10-27 PROCEDURE — 86140 C-REACTIVE PROTEIN: CPT | Performed by: INTERNAL MEDICINE

## 2022-10-27 PROCEDURE — 85025 COMPLETE CBC W/AUTO DIFF WBC: CPT | Performed by: INTERNAL MEDICINE

## 2022-10-27 PROCEDURE — 80053 COMPREHEN METABOLIC PANEL: CPT | Performed by: INTERNAL MEDICINE

## 2022-10-27 PROCEDURE — 82962 GLUCOSE BLOOD TEST: CPT

## 2022-10-27 PROCEDURE — 99232 SBSQ HOSP IP/OBS MODERATE 35: CPT | Performed by: INTERNAL MEDICINE

## 2022-10-28 ENCOUNTER — OUTSIDE FACILITY SERVICE (OUTPATIENT)
Dept: INFECTIOUS DISEASES | Facility: CLINIC | Age: 57
End: 2022-10-28

## 2022-10-28 ENCOUNTER — HOSPITAL ENCOUNTER (INPATIENT)
Facility: HOSPITAL | Age: 57
LOS: 33 days | Discharge: ANOTHER HOSPITAL | End: 2022-11-30
Attending: INTERNAL MEDICINE | Admitting: INTERNAL MEDICINE

## 2022-10-28 DIAGNOSIS — Z87.39 HISTORY OF OSTEOMYELITIS: ICD-10-CM

## 2022-10-28 DIAGNOSIS — M00.061 STAPHYLOCOCCAL ARTHRITIS OF RIGHT KNEE: Primary | ICD-10-CM

## 2022-10-28 PROBLEM — R53.81 DEBILITY: Status: ACTIVE | Noted: 2022-10-28

## 2022-10-28 LAB
ALBUMIN SERPL-MCNC: 2.29 G/DL (ref 3.5–5.2)
ALBUMIN/GLOB SERPL: 0.7 G/DL
ALP SERPL-CCNC: 311 U/L (ref 39–117)
ALT SERPL W P-5'-P-CCNC: 29 U/L (ref 1–41)
ANION GAP SERPL CALCULATED.3IONS-SCNC: 9.1 MMOL/L (ref 5–15)
AST SERPL-CCNC: 33 U/L (ref 1–40)
BASOPHILS # BLD AUTO: 0.01 10*3/MM3 (ref 0–0.2)
BASOPHILS NFR BLD AUTO: 0.3 % (ref 0–1.5)
BILIRUB SERPL-MCNC: 0.4 MG/DL (ref 0–1.2)
BUN SERPL-MCNC: 11 MG/DL (ref 6–20)
BUN/CREAT SERPL: 18.3 (ref 7–25)
CALCIUM SPEC-SCNC: 8.1 MG/DL (ref 8.6–10.5)
CHLORIDE SERPL-SCNC: 102 MMOL/L (ref 98–107)
CO2 SERPL-SCNC: 24.9 MMOL/L (ref 22–29)
CREAT SERPL-MCNC: 0.6 MG/DL (ref 0.76–1.27)
CRP SERPL-MCNC: 4 MG/DL (ref 0–0.5)
DEPRECATED RDW RBC AUTO: 58.6 FL (ref 37–54)
EGFRCR SERPLBLD CKD-EPI 2021: 112.6 ML/MIN/1.73
EOSINOPHIL # BLD AUTO: 0.12 10*3/MM3 (ref 0–0.4)
EOSINOPHIL NFR BLD AUTO: 3.3 % (ref 0.3–6.2)
ERYTHROCYTE [DISTWIDTH] IN BLOOD BY AUTOMATED COUNT: 18.9 % (ref 12.3–15.4)
GLOBULIN UR ELPH-MCNC: 3.5 GM/DL
GLUCOSE BLDC GLUCOMTR-MCNC: 160 MG/DL (ref 70–130)
GLUCOSE BLDC GLUCOMTR-MCNC: 162 MG/DL (ref 70–130)
GLUCOSE BLDC GLUCOMTR-MCNC: 185 MG/DL (ref 70–130)
GLUCOSE BLDC GLUCOMTR-MCNC: 244 MG/DL (ref 70–130)
GLUCOSE BLDC GLUCOMTR-MCNC: 253 MG/DL (ref 70–130)
GLUCOSE SERPL-MCNC: 179 MG/DL (ref 65–99)
HCT VFR BLD AUTO: 26.4 % (ref 37.5–51)
HGB BLD-MCNC: 8.2 G/DL (ref 13–17.7)
IMM GRANULOCYTES # BLD AUTO: 0.02 10*3/MM3 (ref 0–0.05)
IMM GRANULOCYTES NFR BLD AUTO: 0.6 % (ref 0–0.5)
LYMPHOCYTES # BLD AUTO: 0.79 10*3/MM3 (ref 0.7–3.1)
LYMPHOCYTES NFR BLD AUTO: 21.8 % (ref 19.6–45.3)
MCH RBC QN AUTO: 26.5 PG (ref 26.6–33)
MCHC RBC AUTO-ENTMCNC: 31.1 G/DL (ref 31.5–35.7)
MCV RBC AUTO: 85.2 FL (ref 79–97)
MONOCYTES # BLD AUTO: 0.33 10*3/MM3 (ref 0.1–0.9)
MONOCYTES NFR BLD AUTO: 9.1 % (ref 5–12)
NEUTROPHILS NFR BLD AUTO: 2.35 10*3/MM3 (ref 1.7–7)
NEUTROPHILS NFR BLD AUTO: 64.9 % (ref 42.7–76)
NRBC BLD AUTO-RTO: 0 /100 WBC (ref 0–0.2)
PLATELET # BLD AUTO: 106 10*3/MM3 (ref 140–450)
PMV BLD AUTO: 10.4 FL (ref 6–12)
POTASSIUM SERPL-SCNC: 3.9 MMOL/L (ref 3.5–5.2)
PROT SERPL-MCNC: 5.8 G/DL (ref 6–8.5)
RBC # BLD AUTO: 3.1 10*6/MM3 (ref 4.14–5.8)
SARS-COV-2 RNA RESP QL NAA+PROBE: NOT DETECTED
SODIUM SERPL-SCNC: 136 MMOL/L (ref 136–145)
WBC NRBC COR # BLD: 3.62 10*3/MM3 (ref 3.4–10.8)

## 2022-10-28 PROCEDURE — 36415 COLL VENOUS BLD VENIPUNCTURE: CPT | Performed by: INTERNAL MEDICINE

## 2022-10-28 PROCEDURE — U0003 INFECTIOUS AGENT DETECTION BY NUCLEIC ACID (DNA OR RNA); SEVERE ACUTE RESPIRATORY SYNDROME CORONAVIRUS 2 (SARS-COV-2) (CORONAVIRUS DISEASE [COVID-19]), AMPLIFIED PROBE TECHNIQUE, MAKING USE OF HIGH THROUGHPUT TECHNOLOGIES AS DESCRIBED BY CMS-2020-01-R: HCPCS | Performed by: PHYSICIAN ASSISTANT

## 2022-10-28 PROCEDURE — 99222 1ST HOSP IP/OBS MODERATE 55: CPT | Performed by: INTERNAL MEDICINE

## 2022-10-28 PROCEDURE — 85025 COMPLETE CBC W/AUTO DIFF WBC: CPT | Performed by: INTERNAL MEDICINE

## 2022-10-28 PROCEDURE — 80053 COMPREHEN METABOLIC PANEL: CPT | Performed by: INTERNAL MEDICINE

## 2022-10-28 PROCEDURE — 86140 C-REACTIVE PROTEIN: CPT | Performed by: INTERNAL MEDICINE

## 2022-10-28 PROCEDURE — 63710000001 INSULIN DETEMIR PER 5 UNITS: Performed by: INTERNAL MEDICINE

## 2022-10-28 PROCEDURE — 25010000002 FONDAPARINUX PER 0.5 MG: Performed by: INTERNAL MEDICINE

## 2022-10-28 PROCEDURE — 82962 GLUCOSE BLOOD TEST: CPT

## 2022-10-28 PROCEDURE — 25010000002 DAPTOMYCIN PER 1 MG: Performed by: INTERNAL MEDICINE

## 2022-10-28 PROCEDURE — 63710000001 INSULIN ASPART PER 5 UNITS: Performed by: INTERNAL MEDICINE

## 2022-10-28 RX ORDER — PANTOPRAZOLE SODIUM 40 MG/1
40 TABLET, DELAYED RELEASE ORAL
Status: DISCONTINUED | OUTPATIENT
Start: 2022-10-29 | End: 2022-11-30 | Stop reason: HOSPADM

## 2022-10-28 RX ORDER — MULTIPLE VITAMINS W/ MINERALS TAB 9MG-400MCG
1 TAB ORAL DAILY
Status: DISCONTINUED | OUTPATIENT
Start: 2022-10-28 | End: 2022-11-30 | Stop reason: HOSPADM

## 2022-10-28 RX ORDER — FERROUS SULFATE 325(65) MG
325 TABLET ORAL 2 TIMES DAILY
COMMUNITY
End: 2022-11-30 | Stop reason: HOSPADM

## 2022-10-28 RX ORDER — SPIRONOLACTONE 50 MG/1
50 TABLET, FILM COATED ORAL DAILY
Status: ON HOLD | COMMUNITY
End: 2022-11-28

## 2022-10-28 RX ORDER — HYDROCODONE BITARTRATE AND ACETAMINOPHEN 5; 325 MG/1; MG/1
1 TABLET ORAL EVERY 4 HOURS PRN
Status: ON HOLD | COMMUNITY
End: 2022-10-28

## 2022-10-28 RX ORDER — PSYLLIUM SEED (WITH DEXTROSE)
2 POWDER (GRAM) ORAL AS NEEDED
COMMUNITY
End: 2022-11-30 | Stop reason: HOSPADM

## 2022-10-28 RX ORDER — ACETAMINOPHEN 325 MG/1
650 TABLET ORAL EVERY 4 HOURS PRN
Status: DISCONTINUED | OUTPATIENT
Start: 2022-10-28 | End: 2022-11-30 | Stop reason: HOSPADM

## 2022-10-28 RX ORDER — MULTIPLE VITAMINS W/ MINERALS TAB 9MG-400MCG
1 TAB ORAL DAILY
COMMUNITY

## 2022-10-28 RX ORDER — HYDROCODONE BITARTRATE AND ACETAMINOPHEN 5; 325 MG/1; MG/1
1 TABLET ORAL EVERY 8 HOURS PRN
Status: DISCONTINUED | OUTPATIENT
Start: 2022-10-28 | End: 2022-11-30 | Stop reason: HOSPADM

## 2022-10-28 RX ORDER — INSULIN ASPART 100 [IU]/ML
10 INJECTION, SOLUTION INTRAVENOUS; SUBCUTANEOUS
COMMUNITY
End: 2022-11-30 | Stop reason: HOSPADM

## 2022-10-28 RX ORDER — MIDODRINE HYDROCHLORIDE 2.5 MG/1
2.5 TABLET ORAL
Status: DISCONTINUED | OUTPATIENT
Start: 2022-10-29 | End: 2022-10-29

## 2022-10-28 RX ORDER — TRAZODONE HYDROCHLORIDE 50 MG/1
50 TABLET ORAL NIGHTLY
Status: DISCONTINUED | OUTPATIENT
Start: 2022-10-28 | End: 2022-10-31

## 2022-10-28 RX ORDER — NABUMETONE 750 MG/1
750 TABLET, FILM COATED ORAL 2 TIMES DAILY
COMMUNITY
End: 2022-11-30 | Stop reason: HOSPADM

## 2022-10-28 RX ORDER — INSULIN ASPART 100 [IU]/ML
0-9 INJECTION, SOLUTION INTRAVENOUS; SUBCUTANEOUS
Status: DISCONTINUED | OUTPATIENT
Start: 2022-10-28 | End: 2022-11-08

## 2022-10-28 RX ORDER — ATORVASTATIN CALCIUM 40 MG/1
40 TABLET, FILM COATED ORAL DAILY
COMMUNITY
End: 2022-11-30 | Stop reason: HOSPADM

## 2022-10-28 RX ORDER — ASPIRIN 81 MG/1
81 TABLET ORAL DAILY
COMMUNITY
End: 2022-12-15 | Stop reason: HOSPADM

## 2022-10-28 RX ORDER — LEVOTHYROXINE SODIUM 0.03 MG/1
25 TABLET ORAL
Status: DISCONTINUED | OUTPATIENT
Start: 2022-10-29 | End: 2022-11-30 | Stop reason: HOSPADM

## 2022-10-28 RX ORDER — ASCORBIC ACID 500 MG
500 TABLET ORAL DAILY
Status: DISCONTINUED | OUTPATIENT
Start: 2022-10-28 | End: 2022-11-30 | Stop reason: HOSPADM

## 2022-10-28 RX ORDER — INSULIN GLARGINE 100 [IU]/ML
10 INJECTION, SOLUTION SUBCUTANEOUS 2 TIMES DAILY
Status: ON HOLD | COMMUNITY
End: 2022-10-28

## 2022-10-28 RX ORDER — DEXTROSE MONOHYDRATE 25 G/50ML
25 INJECTION, SOLUTION INTRAVENOUS
Status: DISCONTINUED | OUTPATIENT
Start: 2022-10-28 | End: 2022-11-08

## 2022-10-28 RX ORDER — PREGABALIN 100 MG/1
100 CAPSULE ORAL EVERY 12 HOURS SCHEDULED
Status: DISCONTINUED | OUTPATIENT
Start: 2022-10-28 | End: 2022-11-30 | Stop reason: HOSPADM

## 2022-10-28 RX ORDER — FLUTICASONE PROPIONATE 50 MCG
1-2 SPRAY, SUSPENSION (ML) NASAL DAILY PRN
Status: ON HOLD | COMMUNITY
End: 2022-10-28

## 2022-10-28 RX ORDER — PREGABALIN 150 MG/1
150 CAPSULE ORAL
COMMUNITY
End: 2022-11-30 | Stop reason: HOSPADM

## 2022-10-28 RX ORDER — IRON POLYSACCHARIDE COMPLEX 150 MG
150 CAPSULE ORAL DAILY
Status: DISCONTINUED | OUTPATIENT
Start: 2022-10-29 | End: 2022-11-30 | Stop reason: HOSPADM

## 2022-10-28 RX ORDER — DOCUSATE SODIUM 100 MG/1
100 CAPSULE, LIQUID FILLED ORAL 2 TIMES DAILY
Status: DISCONTINUED | OUTPATIENT
Start: 2022-10-28 | End: 2022-11-30 | Stop reason: HOSPADM

## 2022-10-28 RX ORDER — TIZANIDINE 4 MG/1
4 TABLET ORAL EVERY 12 HOURS PRN
COMMUNITY
End: 2022-11-30 | Stop reason: HOSPADM

## 2022-10-28 RX ORDER — NICOTINE POLACRILEX 4 MG
15 LOZENGE BUCCAL
Status: DISCONTINUED | OUTPATIENT
Start: 2022-10-28 | End: 2022-11-08

## 2022-10-28 RX ORDER — FONDAPARINUX SODIUM 2.5 MG/.5ML
2.5 INJECTION SUBCUTANEOUS
Status: DISCONTINUED | OUTPATIENT
Start: 2022-10-28 | End: 2022-11-17

## 2022-10-28 RX ORDER — ASPIRIN 81 MG/1
81 TABLET ORAL DAILY
Status: DISCONTINUED | OUTPATIENT
Start: 2022-10-28 | End: 2022-11-28

## 2022-10-28 RX ADMIN — PREGABALIN 100 MG: 100 CAPSULE ORAL at 21:15

## 2022-10-28 RX ADMIN — DOCUSATE SODIUM 100 MG: 100 CAPSULE ORAL at 21:15

## 2022-10-28 RX ADMIN — INSULIN DETEMIR 25 UNITS: 100 INJECTION, SOLUTION SUBCUTANEOUS at 21:56

## 2022-10-28 RX ADMIN — FONDAPARINUX SODIUM 2.5 MG: 2.5 INJECTION, SOLUTION SUBCUTANEOUS at 21:15

## 2022-10-28 RX ADMIN — DAPTOMYCIN 500 MG: 500 INJECTION, POWDER, LYOPHILIZED, FOR SOLUTION INTRAVENOUS at 21:16

## 2022-10-28 RX ADMIN — HYDROCODONE BITARTRATE AND ACETAMINOPHEN 1 TABLET: 5; 325 TABLET ORAL at 21:15

## 2022-10-28 RX ADMIN — INSULIN ASPART 2 UNITS: 100 INJECTION, SOLUTION INTRAVENOUS; SUBCUTANEOUS at 19:07

## 2022-10-28 RX ADMIN — TRAZODONE HYDROCHLORIDE 50 MG: 50 TABLET ORAL at 21:15

## 2022-10-29 LAB
ALBUMIN SERPL-MCNC: 2.13 G/DL (ref 3.5–5.2)
ALBUMIN/GLOB SERPL: 0.7 G/DL
ALP SERPL-CCNC: 289 U/L (ref 39–117)
ALT SERPL W P-5'-P-CCNC: 26 U/L (ref 1–41)
ANION GAP SERPL CALCULATED.3IONS-SCNC: 6.7 MMOL/L (ref 5–15)
AST SERPL-CCNC: 30 U/L (ref 1–40)
BACTERIA SPEC AEROBE CULT: NORMAL
BACTERIA SPEC AEROBE CULT: NORMAL
BASOPHILS # BLD AUTO: 0.02 10*3/MM3 (ref 0–0.2)
BASOPHILS NFR BLD AUTO: 0.7 % (ref 0–1.5)
BILIRUB SERPL-MCNC: 0.4 MG/DL (ref 0–1.2)
BUN SERPL-MCNC: 10 MG/DL (ref 6–20)
BUN/CREAT SERPL: 15.9 (ref 7–25)
CALCIUM SPEC-SCNC: 8.2 MG/DL (ref 8.6–10.5)
CHLORIDE SERPL-SCNC: 101 MMOL/L (ref 98–107)
CK SERPL-CCNC: 34 U/L (ref 20–200)
CO2 SERPL-SCNC: 25.3 MMOL/L (ref 22–29)
CORTIS SERPL-MCNC: 11.78 MCG/DL
CORTIS SERPL-MCNC: 27.13 MCG/DL
CORTIS SERPL-MCNC: 31.88 MCG/DL
CREAT SERPL-MCNC: 0.63 MG/DL (ref 0.76–1.27)
CRP SERPL-MCNC: 3.73 MG/DL (ref 0–0.5)
DEPRECATED RDW RBC AUTO: 58.4 FL (ref 37–54)
EGFRCR SERPLBLD CKD-EPI 2021: 110.9 ML/MIN/1.73
EOSINOPHIL # BLD AUTO: 0.07 10*3/MM3 (ref 0–0.4)
EOSINOPHIL NFR BLD AUTO: 2.3 % (ref 0.3–6.2)
ERYTHROCYTE [DISTWIDTH] IN BLOOD BY AUTOMATED COUNT: 19 % (ref 12.3–15.4)
GLOBULIN UR ELPH-MCNC: 3.3 GM/DL
GLUCOSE BLDC GLUCOMTR-MCNC: 116 MG/DL (ref 70–130)
GLUCOSE BLDC GLUCOMTR-MCNC: 166 MG/DL (ref 70–130)
GLUCOSE BLDC GLUCOMTR-MCNC: 202 MG/DL (ref 70–130)
GLUCOSE BLDC GLUCOMTR-MCNC: 65 MG/DL (ref 70–130)
GLUCOSE BLDC GLUCOMTR-MCNC: 66 MG/DL (ref 70–130)
GLUCOSE SERPL-MCNC: 152 MG/DL (ref 65–99)
HCT VFR BLD AUTO: 26.1 % (ref 37.5–51)
HGB BLD-MCNC: 8 G/DL (ref 13–17.7)
IMM GRANULOCYTES # BLD AUTO: 0.01 10*3/MM3 (ref 0–0.05)
IMM GRANULOCYTES NFR BLD AUTO: 0.3 % (ref 0–0.5)
LYMPHOCYTES # BLD AUTO: 0.88 10*3/MM3 (ref 0.7–3.1)
LYMPHOCYTES NFR BLD AUTO: 29.5 % (ref 19.6–45.3)
MAGNESIUM SERPL-MCNC: 1.8 MG/DL (ref 1.6–2.6)
MCH RBC QN AUTO: 25.9 PG (ref 26.6–33)
MCHC RBC AUTO-ENTMCNC: 30.7 G/DL (ref 31.5–35.7)
MCV RBC AUTO: 84.5 FL (ref 79–97)
MONOCYTES # BLD AUTO: 0.24 10*3/MM3 (ref 0.1–0.9)
MONOCYTES NFR BLD AUTO: 8.1 % (ref 5–12)
NEUTROPHILS NFR BLD AUTO: 1.76 10*3/MM3 (ref 1.7–7)
NEUTROPHILS NFR BLD AUTO: 59.1 % (ref 42.7–76)
NRBC BLD AUTO-RTO: 0 /100 WBC (ref 0–0.2)
PLATELET # BLD AUTO: 90 10*3/MM3 (ref 140–450)
PMV BLD AUTO: 9.3 FL (ref 6–12)
POTASSIUM SERPL-SCNC: 3.8 MMOL/L (ref 3.5–5.2)
PROT SERPL-MCNC: 5.4 G/DL (ref 6–8.5)
RBC # BLD AUTO: 3.09 10*6/MM3 (ref 4.14–5.8)
SODIUM SERPL-SCNC: 133 MMOL/L (ref 136–145)
WBC NRBC COR # BLD: 2.98 10*3/MM3 (ref 3.4–10.8)

## 2022-10-29 PROCEDURE — 97535 SELF CARE MNGMENT TRAINING: CPT

## 2022-10-29 PROCEDURE — 80053 COMPREHEN METABOLIC PANEL: CPT | Performed by: INTERNAL MEDICINE

## 2022-10-29 PROCEDURE — 97162 PT EVAL MOD COMPLEX 30 MIN: CPT

## 2022-10-29 PROCEDURE — 97140 MANUAL THERAPY 1/> REGIONS: CPT

## 2022-10-29 PROCEDURE — 97110 THERAPEUTIC EXERCISES: CPT

## 2022-10-29 PROCEDURE — 83735 ASSAY OF MAGNESIUM: CPT | Performed by: INTERNAL MEDICINE

## 2022-10-29 PROCEDURE — 25010000002 COSYNTROPIN PER 0.25 MG: Performed by: INTERNAL MEDICINE

## 2022-10-29 PROCEDURE — 97530 THERAPEUTIC ACTIVITIES: CPT

## 2022-10-29 PROCEDURE — 97167 OT EVAL HIGH COMPLEX 60 MIN: CPT

## 2022-10-29 PROCEDURE — 63710000001 INSULIN DETEMIR PER 5 UNITS: Performed by: INTERNAL MEDICINE

## 2022-10-29 PROCEDURE — 85025 COMPLETE CBC W/AUTO DIFF WBC: CPT | Performed by: INTERNAL MEDICINE

## 2022-10-29 PROCEDURE — 99304 1ST NF CARE SF/LOW MDM 25: CPT | Performed by: NURSE PRACTITIONER

## 2022-10-29 PROCEDURE — 82962 GLUCOSE BLOOD TEST: CPT

## 2022-10-29 PROCEDURE — 25010000002 DAPTOMYCIN PER 1 MG: Performed by: INTERNAL MEDICINE

## 2022-10-29 PROCEDURE — 25010000002 MAGNESIUM SULFATE 2 GM/50ML SOLUTION: Performed by: INTERNAL MEDICINE

## 2022-10-29 PROCEDURE — 63710000001 INSULIN ASPART PER 5 UNITS: Performed by: INTERNAL MEDICINE

## 2022-10-29 PROCEDURE — 25010000002 FONDAPARINUX PER 0.5 MG: Performed by: INTERNAL MEDICINE

## 2022-10-29 PROCEDURE — 86140 C-REACTIVE PROTEIN: CPT | Performed by: INTERNAL MEDICINE

## 2022-10-29 PROCEDURE — 25010000002 GLUCAGON (HUMAN RECOMBINANT) 1 MG RECONSTITUTED SOLUTION: Performed by: INTERNAL MEDICINE

## 2022-10-29 PROCEDURE — 82533 TOTAL CORTISOL: CPT | Performed by: INTERNAL MEDICINE

## 2022-10-29 PROCEDURE — 82550 ASSAY OF CK (CPK): CPT | Performed by: INTERNAL MEDICINE

## 2022-10-29 PROCEDURE — 99232 SBSQ HOSP IP/OBS MODERATE 35: CPT | Performed by: INTERNAL MEDICINE

## 2022-10-29 RX ORDER — MIDODRINE HYDROCHLORIDE 2.5 MG/1
5 TABLET ORAL
Status: DISCONTINUED | OUTPATIENT
Start: 2022-10-29 | End: 2022-10-31

## 2022-10-29 RX ORDER — MAGNESIUM SULFATE HEPTAHYDRATE 40 MG/ML
2 INJECTION, SOLUTION INTRAVENOUS ONCE
Status: COMPLETED | OUTPATIENT
Start: 2022-10-29 | End: 2022-10-29

## 2022-10-29 RX ORDER — COSYNTROPIN 0.25 MG/ML
0.25 INJECTION, POWDER, FOR SOLUTION INTRAMUSCULAR; INTRAVENOUS ONCE
Status: COMPLETED | OUTPATIENT
Start: 2022-10-29 | End: 2022-10-29

## 2022-10-29 RX ADMIN — INSULIN ASPART 4 UNITS: 100 INJECTION, SOLUTION INTRAVENOUS; SUBCUTANEOUS at 16:55

## 2022-10-29 RX ADMIN — INSULIN ASPART 2 UNITS: 100 INJECTION, SOLUTION INTRAVENOUS; SUBCUTANEOUS at 11:38

## 2022-10-29 RX ADMIN — PANTOPRAZOLE SODIUM 40 MG: 40 TABLET, DELAYED RELEASE ORAL at 05:41

## 2022-10-29 RX ADMIN — INSULIN DETEMIR 20 UNITS: 100 INJECTION, SOLUTION SUBCUTANEOUS at 09:03

## 2022-10-29 RX ADMIN — CARBIDOPA AND LEVODOPA 2.5 MG: 50; 200 TABLET, EXTENDED RELEASE ORAL at 06:30

## 2022-10-29 RX ADMIN — MAGNESIUM SULFATE HEPTAHYDRATE 2 G: 40 INJECTION, SOLUTION INTRAVENOUS at 09:56

## 2022-10-29 RX ADMIN — HYDROCODONE BITARTRATE AND ACETAMINOPHEN 1 TABLET: 5; 325 TABLET ORAL at 06:32

## 2022-10-29 RX ADMIN — HYDROCODONE BITARTRATE AND ACETAMINOPHEN 1 TABLET: 5; 325 TABLET ORAL at 18:27

## 2022-10-29 RX ADMIN — FONDAPARINUX SODIUM 2.5 MG: 2.5 INJECTION, SOLUTION SUBCUTANEOUS at 08:41

## 2022-10-29 RX ADMIN — PREGABALIN 100 MG: 100 CAPSULE ORAL at 21:32

## 2022-10-29 RX ADMIN — CARBIDOPA AND LEVODOPA 5 MG: 50; 200 TABLET, EXTENDED RELEASE ORAL at 11:38

## 2022-10-29 RX ADMIN — LEVOTHYROXINE SODIUM 25 MCG: 0.07 TABLET ORAL at 05:41

## 2022-10-29 RX ADMIN — ASPIRIN 81 MG: 81 TABLET, COATED ORAL at 08:40

## 2022-10-29 RX ADMIN — Medication 1 TABLET: at 08:40

## 2022-10-29 RX ADMIN — DAPTOMYCIN 500 MG: 500 INJECTION, POWDER, LYOPHILIZED, FOR SOLUTION INTRAVENOUS at 18:12

## 2022-10-29 RX ADMIN — GLUCAGON HYDROCHLORIDE 1 MG: KIT at 06:38

## 2022-10-29 RX ADMIN — INSULIN DETEMIR 20 UNITS: 100 INJECTION, SOLUTION SUBCUTANEOUS at 21:47

## 2022-10-29 RX ADMIN — TRAZODONE HYDROCHLORIDE 50 MG: 50 TABLET ORAL at 21:28

## 2022-10-29 RX ADMIN — PREGABALIN 100 MG: 100 CAPSULE ORAL at 08:40

## 2022-10-29 RX ADMIN — COSYNTROPIN 0.25 MG: 0.25 INJECTION, POWDER, LYOPHILIZED, FOR SOLUTION INTRAVENOUS at 10:10

## 2022-10-29 RX ADMIN — Medication 150 MG: at 08:40

## 2022-10-29 RX ADMIN — OXYCODONE HYDROCHLORIDE AND ACETAMINOPHEN 500 MG: 500 TABLET ORAL at 08:40

## 2022-10-29 RX ADMIN — CARBIDOPA AND LEVODOPA 5 MG: 50; 200 TABLET, EXTENDED RELEASE ORAL at 16:55

## 2022-10-30 LAB
ANION GAP SERPL CALCULATED.3IONS-SCNC: 7.9 MMOL/L (ref 5–15)
BASOPHILS # BLD AUTO: 0.01 10*3/MM3 (ref 0–0.2)
BASOPHILS NFR BLD AUTO: 0.3 % (ref 0–1.5)
BUN SERPL-MCNC: 11 MG/DL (ref 6–20)
BUN/CREAT SERPL: 20 (ref 7–25)
CALCIUM SPEC-SCNC: 8.1 MG/DL (ref 8.6–10.5)
CHLORIDE SERPL-SCNC: 102 MMOL/L (ref 98–107)
CO2 SERPL-SCNC: 26.1 MMOL/L (ref 22–29)
CREAT SERPL-MCNC: 0.55 MG/DL (ref 0.76–1.27)
DEPRECATED RDW RBC AUTO: 58.6 FL (ref 37–54)
EGFRCR SERPLBLD CKD-EPI 2021: 115.6 ML/MIN/1.73
EOSINOPHIL # BLD AUTO: 0.09 10*3/MM3 (ref 0–0.4)
EOSINOPHIL NFR BLD AUTO: 2.3 % (ref 0.3–6.2)
ERYTHROCYTE [DISTWIDTH] IN BLOOD BY AUTOMATED COUNT: 18.8 % (ref 12.3–15.4)
GLUCOSE BLDC GLUCOMTR-MCNC: 109 MG/DL (ref 70–130)
GLUCOSE BLDC GLUCOMTR-MCNC: 112 MG/DL (ref 70–130)
GLUCOSE BLDC GLUCOMTR-MCNC: 145 MG/DL (ref 70–130)
GLUCOSE SERPL-MCNC: 211 MG/DL (ref 65–99)
HCT VFR BLD AUTO: 26.9 % (ref 37.5–51)
HGB BLD-MCNC: 8.2 G/DL (ref 13–17.7)
IMM GRANULOCYTES # BLD AUTO: 0.02 10*3/MM3 (ref 0–0.05)
IMM GRANULOCYTES NFR BLD AUTO: 0.5 % (ref 0–0.5)
LYMPHOCYTES # BLD AUTO: 1.02 10*3/MM3 (ref 0.7–3.1)
LYMPHOCYTES NFR BLD AUTO: 26.5 % (ref 19.6–45.3)
MAGNESIUM SERPL-MCNC: 2.1 MG/DL (ref 1.6–2.6)
MCH RBC QN AUTO: 25.9 PG (ref 26.6–33)
MCHC RBC AUTO-ENTMCNC: 30.5 G/DL (ref 31.5–35.7)
MCV RBC AUTO: 85.1 FL (ref 79–97)
MONOCYTES # BLD AUTO: 0.28 10*3/MM3 (ref 0.1–0.9)
MONOCYTES NFR BLD AUTO: 7.3 % (ref 5–12)
NEUTROPHILS NFR BLD AUTO: 2.43 10*3/MM3 (ref 1.7–7)
NEUTROPHILS NFR BLD AUTO: 63.1 % (ref 42.7–76)
NRBC BLD AUTO-RTO: 0 /100 WBC (ref 0–0.2)
PLATELET # BLD AUTO: 96 10*3/MM3 (ref 140–450)
PMV BLD AUTO: 9.7 FL (ref 6–12)
POTASSIUM SERPL-SCNC: 3.9 MMOL/L (ref 3.5–5.2)
RBC # BLD AUTO: 3.16 10*6/MM3 (ref 4.14–5.8)
SODIUM SERPL-SCNC: 136 MMOL/L (ref 136–145)
WBC NRBC COR # BLD: 3.85 10*3/MM3 (ref 3.4–10.8)

## 2022-10-30 PROCEDURE — 25010000002 DAPTOMYCIN PER 1 MG: Performed by: INTERNAL MEDICINE

## 2022-10-30 PROCEDURE — 63710000001 INSULIN DETEMIR PER 5 UNITS: Performed by: INTERNAL MEDICINE

## 2022-10-30 PROCEDURE — 82962 GLUCOSE BLOOD TEST: CPT

## 2022-10-30 PROCEDURE — 25010000002 FONDAPARINUX PER 0.5 MG: Performed by: INTERNAL MEDICINE

## 2022-10-30 PROCEDURE — 80048 BASIC METABOLIC PNL TOTAL CA: CPT | Performed by: INTERNAL MEDICINE

## 2022-10-30 PROCEDURE — 85025 COMPLETE CBC W/AUTO DIFF WBC: CPT | Performed by: INTERNAL MEDICINE

## 2022-10-30 PROCEDURE — 83735 ASSAY OF MAGNESIUM: CPT | Performed by: INTERNAL MEDICINE

## 2022-10-30 PROCEDURE — 99231 SBSQ HOSP IP/OBS SF/LOW 25: CPT | Performed by: INTERNAL MEDICINE

## 2022-10-30 RX ADMIN — INSULIN DETEMIR 20 UNITS: 100 INJECTION, SOLUTION SUBCUTANEOUS at 21:00

## 2022-10-30 RX ADMIN — HYDROCODONE BITARTRATE AND ACETAMINOPHEN 1 TABLET: 5; 325 TABLET ORAL at 09:55

## 2022-10-30 RX ADMIN — TRAZODONE HYDROCHLORIDE 50 MG: 50 TABLET ORAL at 20:27

## 2022-10-30 RX ADMIN — Medication 1 TABLET: at 09:46

## 2022-10-30 RX ADMIN — CARBIDOPA AND LEVODOPA 5 MG: 50; 200 TABLET, EXTENDED RELEASE ORAL at 12:15

## 2022-10-30 RX ADMIN — LEVOTHYROXINE SODIUM 25 MCG: 0.07 TABLET ORAL at 05:09

## 2022-10-30 RX ADMIN — CARBIDOPA AND LEVODOPA 5 MG: 50; 200 TABLET, EXTENDED RELEASE ORAL at 06:33

## 2022-10-30 RX ADMIN — PREGABALIN 100 MG: 100 CAPSULE ORAL at 09:55

## 2022-10-30 RX ADMIN — Medication 150 MG: at 09:46

## 2022-10-30 RX ADMIN — DAPTOMYCIN 500 MG: 500 INJECTION, POWDER, LYOPHILIZED, FOR SOLUTION INTRAVENOUS at 19:34

## 2022-10-30 RX ADMIN — CARBIDOPA AND LEVODOPA 5 MG: 50; 200 TABLET, EXTENDED RELEASE ORAL at 17:19

## 2022-10-30 RX ADMIN — DOCUSATE SODIUM 100 MG: 100 CAPSULE ORAL at 09:46

## 2022-10-30 RX ADMIN — INSULIN DETEMIR 20 UNITS: 100 INJECTION, SOLUTION SUBCUTANEOUS at 09:55

## 2022-10-30 RX ADMIN — ASPIRIN 81 MG: 81 TABLET, COATED ORAL at 09:46

## 2022-10-30 RX ADMIN — FONDAPARINUX SODIUM 2.5 MG: 2.5 INJECTION, SOLUTION SUBCUTANEOUS at 09:46

## 2022-10-30 RX ADMIN — PREGABALIN 100 MG: 100 CAPSULE ORAL at 20:28

## 2022-10-30 RX ADMIN — OXYCODONE HYDROCHLORIDE AND ACETAMINOPHEN 500 MG: 500 TABLET ORAL at 09:46

## 2022-10-30 RX ADMIN — PANTOPRAZOLE SODIUM 40 MG: 40 TABLET, DELAYED RELEASE ORAL at 05:09

## 2022-10-31 LAB
ANION GAP SERPL CALCULATED.3IONS-SCNC: 8.4 MMOL/L (ref 5–15)
BASOPHILS # BLD AUTO: 0.02 10*3/MM3 (ref 0–0.2)
BASOPHILS NFR BLD AUTO: 0.6 % (ref 0–1.5)
BUN SERPL-MCNC: 9 MG/DL (ref 6–20)
BUN/CREAT SERPL: 14.8 (ref 7–25)
CALCIUM SPEC-SCNC: 8 MG/DL (ref 8.6–10.5)
CHLORIDE SERPL-SCNC: 103 MMOL/L (ref 98–107)
CO2 SERPL-SCNC: 26.6 MMOL/L (ref 22–29)
CREAT SERPL-MCNC: 0.61 MG/DL (ref 0.76–1.27)
DEPRECATED RDW RBC AUTO: 59.3 FL (ref 37–54)
EGFRCR SERPLBLD CKD-EPI 2021: 112 ML/MIN/1.73
EOSINOPHIL # BLD AUTO: 0.1 10*3/MM3 (ref 0–0.4)
EOSINOPHIL NFR BLD AUTO: 3.2 % (ref 0.3–6.2)
ERYTHROCYTE [DISTWIDTH] IN BLOOD BY AUTOMATED COUNT: 19.1 % (ref 12.3–15.4)
GLUCOSE BLDC GLUCOMTR-MCNC: 100 MG/DL (ref 70–130)
GLUCOSE BLDC GLUCOMTR-MCNC: 163 MG/DL (ref 70–130)
GLUCOSE BLDC GLUCOMTR-MCNC: 192 MG/DL (ref 70–130)
GLUCOSE BLDC GLUCOMTR-MCNC: 208 MG/DL (ref 70–130)
GLUCOSE BLDC GLUCOMTR-MCNC: 228 MG/DL (ref 70–130)
GLUCOSE SERPL-MCNC: 154 MG/DL (ref 65–99)
HCT VFR BLD AUTO: 28.8 % (ref 37.5–51)
HGB BLD-MCNC: 8.8 G/DL (ref 13–17.7)
IMM GRANULOCYTES # BLD AUTO: 0.01 10*3/MM3 (ref 0–0.05)
IMM GRANULOCYTES NFR BLD AUTO: 0.3 % (ref 0–0.5)
LYMPHOCYTES # BLD AUTO: 0.71 10*3/MM3 (ref 0.7–3.1)
LYMPHOCYTES NFR BLD AUTO: 22.8 % (ref 19.6–45.3)
MCH RBC QN AUTO: 25.9 PG (ref 26.6–33)
MCHC RBC AUTO-ENTMCNC: 30.6 G/DL (ref 31.5–35.7)
MCV RBC AUTO: 84.7 FL (ref 79–97)
MONOCYTES # BLD AUTO: 0.27 10*3/MM3 (ref 0.1–0.9)
MONOCYTES NFR BLD AUTO: 8.7 % (ref 5–12)
NEUTROPHILS NFR BLD AUTO: 2.01 10*3/MM3 (ref 1.7–7)
NEUTROPHILS NFR BLD AUTO: 64.4 % (ref 42.7–76)
NRBC BLD AUTO-RTO: 0 /100 WBC (ref 0–0.2)
PLATELET # BLD AUTO: 102 10*3/MM3 (ref 140–450)
PMV BLD AUTO: 10.4 FL (ref 6–12)
POTASSIUM SERPL-SCNC: 4.1 MMOL/L (ref 3.5–5.2)
RBC # BLD AUTO: 3.4 10*6/MM3 (ref 4.14–5.8)
SODIUM SERPL-SCNC: 138 MMOL/L (ref 136–145)
WBC NRBC COR # BLD: 3.12 10*3/MM3 (ref 3.4–10.8)

## 2022-10-31 PROCEDURE — 82962 GLUCOSE BLOOD TEST: CPT

## 2022-10-31 PROCEDURE — 85025 COMPLETE CBC W/AUTO DIFF WBC: CPT | Performed by: INTERNAL MEDICINE

## 2022-10-31 PROCEDURE — 97535 SELF CARE MNGMENT TRAINING: CPT | Performed by: OCCUPATIONAL THERAPIST

## 2022-10-31 PROCEDURE — 97530 THERAPEUTIC ACTIVITIES: CPT

## 2022-10-31 PROCEDURE — 97110 THERAPEUTIC EXERCISES: CPT | Performed by: OCCUPATIONAL THERAPIST

## 2022-10-31 PROCEDURE — 63710000001 INSULIN DETEMIR PER 5 UNITS: Performed by: INTERNAL MEDICINE

## 2022-10-31 PROCEDURE — 80048 BASIC METABOLIC PNL TOTAL CA: CPT | Performed by: INTERNAL MEDICINE

## 2022-10-31 PROCEDURE — 99231 SBSQ HOSP IP/OBS SF/LOW 25: CPT | Performed by: FAMILY MEDICINE

## 2022-10-31 PROCEDURE — 97530 THERAPEUTIC ACTIVITIES: CPT | Performed by: OCCUPATIONAL THERAPIST

## 2022-10-31 PROCEDURE — 25010000002 FONDAPARINUX PER 0.5 MG: Performed by: INTERNAL MEDICINE

## 2022-10-31 PROCEDURE — 25010000002 DAPTOMYCIN PER 1 MG: Performed by: NURSE PRACTITIONER

## 2022-10-31 PROCEDURE — 63710000001 INSULIN ASPART PER 5 UNITS: Performed by: INTERNAL MEDICINE

## 2022-10-31 PROCEDURE — 97110 THERAPEUTIC EXERCISES: CPT

## 2022-10-31 PROCEDURE — 99308 SBSQ NF CARE LOW MDM 20: CPT | Performed by: INTERNAL MEDICINE

## 2022-10-31 RX ORDER — MIDODRINE HYDROCHLORIDE 2.5 MG/1
10 TABLET ORAL
Status: DISCONTINUED | OUTPATIENT
Start: 2022-10-31 | End: 2022-11-21

## 2022-10-31 RX ADMIN — DOCUSATE SODIUM 100 MG: 100 CAPSULE ORAL at 09:26

## 2022-10-31 RX ADMIN — INSULIN ASPART 4 UNITS: 100 INJECTION, SOLUTION INTRAVENOUS; SUBCUTANEOUS at 17:49

## 2022-10-31 RX ADMIN — INSULIN ASPART 2 UNITS: 100 INJECTION, SOLUTION INTRAVENOUS; SUBCUTANEOUS at 12:45

## 2022-10-31 RX ADMIN — INSULIN DETEMIR 20 UNITS: 100 INJECTION, SOLUTION SUBCUTANEOUS at 21:37

## 2022-10-31 RX ADMIN — PREGABALIN 100 MG: 100 CAPSULE ORAL at 09:33

## 2022-10-31 RX ADMIN — PANTOPRAZOLE SODIUM 40 MG: 40 TABLET, DELAYED RELEASE ORAL at 05:45

## 2022-10-31 RX ADMIN — PREGABALIN 100 MG: 100 CAPSULE ORAL at 21:36

## 2022-10-31 RX ADMIN — CARBIDOPA AND LEVODOPA 5 MG: 50; 200 TABLET, EXTENDED RELEASE ORAL at 06:32

## 2022-10-31 RX ADMIN — ASPIRIN 81 MG: 81 TABLET, COATED ORAL at 09:26

## 2022-10-31 RX ADMIN — Medication 150 MG: at 09:23

## 2022-10-31 RX ADMIN — CARBIDOPA AND LEVODOPA 10 MG: 50; 200 TABLET, EXTENDED RELEASE ORAL at 17:50

## 2022-10-31 RX ADMIN — CARBIDOPA AND LEVODOPA 10 MG: 50; 200 TABLET, EXTENDED RELEASE ORAL at 12:46

## 2022-10-31 RX ADMIN — DOCUSATE SODIUM 100 MG: 100 CAPSULE ORAL at 21:36

## 2022-10-31 RX ADMIN — Medication 1 TABLET: at 09:26

## 2022-10-31 RX ADMIN — HYDROCODONE BITARTRATE AND ACETAMINOPHEN 1 TABLET: 5; 325 TABLET ORAL at 21:36

## 2022-10-31 RX ADMIN — DAPTOMYCIN 500 MG: 500 INJECTION, POWDER, LYOPHILIZED, FOR SOLUTION INTRAVENOUS at 19:47

## 2022-10-31 RX ADMIN — HYDROCODONE BITARTRATE AND ACETAMINOPHEN 1 TABLET: 5; 325 TABLET ORAL at 05:44

## 2022-10-31 RX ADMIN — INSULIN DETEMIR 20 UNITS: 100 INJECTION, SOLUTION SUBCUTANEOUS at 09:33

## 2022-10-31 RX ADMIN — VITAMINS A AND D OINTMENT 1 APPLICATION: 15.5; 53.4 OINTMENT TOPICAL at 21:40

## 2022-10-31 RX ADMIN — FONDAPARINUX SODIUM 2.5 MG: 2.5 INJECTION, SOLUTION SUBCUTANEOUS at 09:37

## 2022-10-31 RX ADMIN — OXYCODONE HYDROCHLORIDE AND ACETAMINOPHEN 500 MG: 500 TABLET ORAL at 09:26

## 2022-10-31 RX ADMIN — LEVOTHYROXINE SODIUM 25 MCG: 0.07 TABLET ORAL at 05:45

## 2022-11-01 ENCOUNTER — ANESTHESIA EVENT (OUTPATIENT)
Dept: CARDIOLOGY | Facility: HOSPITAL | Age: 57
End: 2022-11-01

## 2022-11-01 ENCOUNTER — APPOINTMENT (OUTPATIENT)
Dept: CARDIOLOGY | Facility: HOSPITAL | Age: 57
End: 2022-11-01

## 2022-11-01 ENCOUNTER — ANESTHESIA (OUTPATIENT)
Dept: CARDIOLOGY | Facility: HOSPITAL | Age: 57
End: 2022-11-01

## 2022-11-01 LAB
GLUCOSE BLDC GLUCOMTR-MCNC: 102 MG/DL (ref 70–130)
GLUCOSE BLDC GLUCOMTR-MCNC: 112 MG/DL (ref 70–130)
GLUCOSE BLDC GLUCOMTR-MCNC: 117 MG/DL (ref 70–130)
GLUCOSE BLDC GLUCOMTR-MCNC: 156 MG/DL (ref 70–130)
GLUCOSE BLDC GLUCOMTR-MCNC: 207 MG/DL (ref 70–130)
GLUCOSE BLDC GLUCOMTR-MCNC: 299 MG/DL (ref 70–130)
GLUCOSE BLDC GLUCOMTR-MCNC: 84 MG/DL (ref 70–130)
GLUCOSE BLDC GLUCOMTR-MCNC: 84 MG/DL (ref 70–130)

## 2022-11-01 PROCEDURE — 97530 THERAPEUTIC ACTIVITIES: CPT

## 2022-11-01 PROCEDURE — 63710000001 INSULIN ASPART PER 5 UNITS: Performed by: FAMILY MEDICINE

## 2022-11-01 PROCEDURE — 93325 DOPPLER ECHO COLOR FLOW MAPG: CPT

## 2022-11-01 PROCEDURE — 63710000001 INSULIN DETEMIR PER 5 UNITS: Performed by: FAMILY MEDICINE

## 2022-11-01 PROCEDURE — 99231 SBSQ HOSP IP/OBS SF/LOW 25: CPT | Performed by: FAMILY MEDICINE

## 2022-11-01 PROCEDURE — 25010000002 PROPOFOL 10 MG/ML EMULSION: Performed by: NURSE ANESTHETIST, CERTIFIED REGISTERED

## 2022-11-01 PROCEDURE — 25010000002 DAPTOMYCIN PER 1 MG: Performed by: FAMILY MEDICINE

## 2022-11-01 PROCEDURE — 82962 GLUCOSE BLOOD TEST: CPT

## 2022-11-01 PROCEDURE — 97535 SELF CARE MNGMENT TRAINING: CPT

## 2022-11-01 PROCEDURE — 93312 ECHO TRANSESOPHAGEAL: CPT

## 2022-11-01 PROCEDURE — 97110 THERAPEUTIC EXERCISES: CPT

## 2022-11-01 RX ORDER — SODIUM CHLORIDE 9 MG/ML
INJECTION, SOLUTION INTRAVENOUS CONTINUOUS PRN
Status: DISCONTINUED | OUTPATIENT
Start: 2022-11-01 | End: 2022-11-01 | Stop reason: SURG

## 2022-11-01 RX ORDER — PROPOFOL 10 MG/ML
VIAL (ML) INTRAVENOUS AS NEEDED
Status: DISCONTINUED | OUTPATIENT
Start: 2022-11-01 | End: 2022-11-01 | Stop reason: SURG

## 2022-11-01 RX ADMIN — DOCUSATE SODIUM 100 MG: 100 CAPSULE ORAL at 20:41

## 2022-11-01 RX ADMIN — CARBIDOPA AND LEVODOPA 10 MG: 50; 200 TABLET, EXTENDED RELEASE ORAL at 06:32

## 2022-11-01 RX ADMIN — CARBIDOPA AND LEVODOPA 10 MG: 50; 200 TABLET, EXTENDED RELEASE ORAL at 17:26

## 2022-11-01 RX ADMIN — DEXTROSE MONOHYDRATE 25 G: 25 INJECTION, SOLUTION INTRAVENOUS at 07:27

## 2022-11-01 RX ADMIN — SODIUM CHLORIDE: 0.9 INJECTION, SOLUTION INTRAVENOUS at 08:34

## 2022-11-01 RX ADMIN — LEVOTHYROXINE SODIUM 25 MCG: 0.07 TABLET ORAL at 05:49

## 2022-11-01 RX ADMIN — DAPTOMYCIN 500 MG: 500 INJECTION, POWDER, LYOPHILIZED, FOR SOLUTION INTRAVENOUS at 19:18

## 2022-11-01 RX ADMIN — PANTOPRAZOLE SODIUM 40 MG: 40 TABLET, DELAYED RELEASE ORAL at 05:49

## 2022-11-01 RX ADMIN — INSULIN DETEMIR 20 UNITS: 100 INJECTION, SOLUTION SUBCUTANEOUS at 20:41

## 2022-11-01 RX ADMIN — PROPOFOL 50 MG: 10 INJECTION, EMULSION INTRAVENOUS at 08:43

## 2022-11-01 RX ADMIN — PROPOFOL 30 MG: 10 INJECTION, EMULSION INTRAVENOUS at 08:44

## 2022-11-01 RX ADMIN — PROPOFOL 20 MG: 10 INJECTION, EMULSION INTRAVENOUS at 08:59

## 2022-11-01 RX ADMIN — PROPOFOL 20 MG: 10 INJECTION, EMULSION INTRAVENOUS at 08:52

## 2022-11-01 RX ADMIN — PROPOFOL 20 MG: 10 INJECTION, EMULSION INTRAVENOUS at 09:00

## 2022-11-01 RX ADMIN — PROPOFOL 20 MG: 10 INJECTION, EMULSION INTRAVENOUS at 08:55

## 2022-11-01 RX ADMIN — HYDROCODONE BITARTRATE AND ACETAMINOPHEN 1 TABLET: 5; 325 TABLET ORAL at 20:40

## 2022-11-01 RX ADMIN — PROPOFOL 20 MG: 10 INJECTION, EMULSION INTRAVENOUS at 08:48

## 2022-11-01 RX ADMIN — PREGABALIN 100 MG: 100 CAPSULE ORAL at 20:41

## 2022-11-01 RX ADMIN — CARBIDOPA AND LEVODOPA 10 MG: 50; 200 TABLET, EXTENDED RELEASE ORAL at 12:05

## 2022-11-01 RX ADMIN — INSULIN ASPART 4 UNITS: 100 INJECTION, SOLUTION INTRAVENOUS; SUBCUTANEOUS at 17:26

## 2022-11-01 RX ADMIN — PROPOFOL 30 MG: 10 INJECTION, EMULSION INTRAVENOUS at 08:45

## 2022-11-01 NOTE — NURSING NOTE
Spoke with Jun from cath lab. Informed of bs of 84 and orders to give d50 and for them to monitor blood sugar while patient in their care.

## 2022-11-01 NOTE — ANESTHESIA PREPROCEDURE EVALUATION
Anesthesia Evaluation     Patient summary reviewed and Nursing notes reviewed   no history of anesthetic complications:  NPO Solid Status: > 8 hours  NPO Liquid Status: > 8 hours           Airway   Mallampati: II  TM distance: >3 FB  Neck ROM: full  No difficulty expected  Dental    (+) poor dentition    Pulmonary - negative pulmonary ROS and normal exam   Cardiovascular - normal exam    (+) hypertension poorly controlled, hyperlipidemia,       Neuro/Psych- negative ROS  GI/Hepatic/Renal/Endo    (+)   liver disease, diabetes mellitus type 2 poorly controlled using insulin,     Musculoskeletal     Abdominal  - normal exam    Bowel sounds: normal.   Substance History - negative use     OB/GYN negative ob/gyn ROS         Other   arthritis,                      Anesthesia Plan    ASA 3     general   total IV anesthesia  intravenous induction     Anesthetic plan, risks, benefits, and alternatives have been provided, discussed and informed consent has been obtained with: patient.  Pre-procedure education provided  Plan discussed with attending.        CODE STATUS:    Code Status (Patient has no pulse and is not breathing): CPR (Attempt to Resuscitate)  Medical Interventions (Patient has pulse or is breathing): Full Support

## 2022-11-01 NOTE — SIGNIFICANT NOTE
11/01/22 1990   Plan   Plan Team conference held today.  SS spoke to pt about plans for discharge on 11-18-22 and how he is doing in therapy.  Pt plans to return home with sister Ros at discharge who will assist with caregiving needs.  Will follow.   Patient/Family in Agreement with Plan yes

## 2022-11-01 NOTE — PLAN OF CARE
Goal Outcome Evaluation:               Resting quietly in bed with no complaints. Continue current plan of care.

## 2022-11-01 NOTE — PROGRESS NOTES
[facility]  PROGRESS NOTE     Patient Identification:  Name:  Melchor Hardwick III  Age:  57 y.o.  Sex:  male  :  1965  MRN:  [id number]  Visit Number:  [id number]  ROOM: 110/2S     Primary Care Provider:  Missy Up APRN    Length of stay in inpatient status:  4    Subjective     Chief Compliant:  No chief complaint on file.      History of Presenting Illness: 57-year-old gentleman being followed for debility had recent septic arthritis involving the right knee with MRSA bacteremia.  Patient has been somewhat limited to this point and his therapy efforts secondary to orthostatic hypotension.  Patient was scheduled for TYRESE this morning to rule out any vegetations.  Patient again had recently had MRSA bacteremia    Objective     Current Hospital Meds:ascorbic acid, 500 mg, Oral, Daily  aspirin, 81 mg, Oral, Daily  DAPTOmycin, 6 mg/kg (Adjusted), Intravenous, Q24H  docusate sodium, 100 mg, Oral, BID  fondaparinux, 2.5 mg, Subcutaneous, Q24H  Insulin Aspart, 0-9 Units, Subcutaneous, TID AC  insulin detemir, 20 Units, Subcutaneous, Q12H  iron polysaccharides, 150 mg, Oral, Daily  levothyroxine, 25 mcg, Oral, Q AM  midodrine, 10 mg, Oral, TID AC  multivitamin with minerals, 1 tablet, Oral, Daily  pantoprazole, 40 mg, Oral, Q AM  pregabalin, 100 mg, Oral, Q12H    Pharmacy Consult,       ----------------------------------------------------------------------------------------------------------------------  Vital Signs:  Temp:  [98.2 °F (36.8 °C)-98.8 °F (37.1 °C)] 98.3 °F (36.8 °C)  Heart Rate:  [66-84] 67  Resp:  [12-22] 15  BP: ()/(50-77) 120/77  SpO2:  [96 %-99 %] 98 %  on  Flow (L/min):  [2] 2;   Device (Oxygen Therapy): nasal cannula  Body mass index is 28.34 kg/m².    Wt Readings from Last 3 Encounters:   22 89.6 kg (197 lb 8.5 oz)   22 98 kg (216 lb)   22 115 kg (254 lb)     Intake & Output (last 3 days)       10/29 0701  10/30 0700 10/30 0701  10/31 0700 10/31  0701 11/01 0700 11/01 0701 11/02 0700    P.O. 1440 1800 1560     I.V. (mL/kg)    250 (2.8)    IV Piggyback    25    Total Intake(mL/kg) 1440 (16.1) 1800 (20.1) 1560 (17.4) 275 (3.1)    Urine (mL/kg/hr) 3125 (1.5) 3430 (1.6) 1000 (0.5)     Stool  0 0     Total Output 3125 3430 1000     Net -1685 -1630 +560 +275            Urine Unmeasured Occurrence  1 x 12 x     Stool Unmeasured Occurrence  1 x 1 x         No diet orders on file  ----------------------------------------------------------------------------------------------------------------------  Physical exam:  Constitutional:   No acute distress  HEENT: Normocephalic atraumatic  Neck: Supple   Cardiovascular: Regular rate and rhythm  Pulmonary/Chest: Clear to auscultation  Abdominal: Positive bowel sounds soft.   Musculoskeletal: Status post transmetatarsal amputation of the left foot  Neurological: No focal deficits  Skin: No rashes  Peripheral vascular:  Genitourinary:  ----------------------------------------------------------------------------------------------------------------------    Last echocardiogram:  Results for orders placed during the hospital encounter of 10/05/22    Adult Transthoracic Echo Complete W/ Cont if Necessary Per Protocol    Interpretation Summary  •  Normal left ventricular cavity size and wall thickness noted. All left ventricular wall segments contract normally.  •  Left ventricular ejection fraction appears to be 61 - 65%.  •  Left ventricular diastolic function is consistent with (grade I) impaired relaxation.  •  The aortic valve exhibits sclerosis. The aortic valve appears trileaflet.  •  No aortic valve regurgitation or stenosis is present. The aortic valve is abnormal in structure.  •  The mitral valve is structurally normal with no significant stenosis present. Trace to mild mitral valve regurgitation is present.  •  There is no evidence of pericardial  effusion.    ----------------------------------------------------------------------------------------------------------------------  Results from last 7 days   Lab Units 10/31/22  0148 10/30/22  0109 10/29/22  0112 10/28/22  0210 10/27/22  0122   CRP mg/dL  --   --  3.73* 4.00* 5.22*   WBC 10*3/mm3 3.12* 3.85 2.98* 3.62 3.92   HEMOGLOBIN g/dL 8.8* 8.2* 8.0* 8.2* 8.5*   HEMATOCRIT % 28.8* 26.9* 26.1* 26.4* 27.1*   MCV fL 84.7 85.1 84.5 85.2 83.4   MCHC g/dL 30.6* 30.5* 30.7* 31.1* 31.4*   PLATELETS 10*3/mm3 102* 96* 90* 106* 110*         Results from last 7 days   Lab Units 10/31/22  0148 10/30/22  0109 10/29/22  0112 10/28/22  0210 10/27/22  0122   SODIUM mmol/L 138 136 133* 136 134*   POTASSIUM mmol/L 4.1 3.9 3.8 3.9 3.9   MAGNESIUM mg/dL  --  2.1 1.8  --   --    CHLORIDE mmol/L 103 102 101 102 102   CO2 mmol/L 26.6 26.1 25.3 24.9 24.6   BUN mg/dL 9 11 10 11 10   CREATININE mg/dL 0.61* 0.55* 0.63* 0.60* 0.60*   CALCIUM mg/dL 8.0* 8.1* 8.2* 8.1* 8.1*   GLUCOSE mg/dL 154* 211* 152* 179* 138*   ALBUMIN g/dL  --   --  2.13* 2.29* 2.32*   BILIRUBIN mg/dL  --   --  0.4 0.4 0.4   ALK PHOS U/L  --   --  289* 311* 335*   AST (SGOT) U/L  --   --  30 33 33   ALT (SGPT) U/L  --   --  26 29 32   Estimated Creatinine Clearance: 150.4 mL/min (A) (by C-G formula based on SCr of 0.61 mg/dL (L)).  No results found for: AMMONIA  Results from last 7 days   Lab Units 10/29/22  0112 10/26/22  0401   CK TOTAL U/L 34 20             Glucose   Date/Time Value Ref Range Status   11/01/2022 1008 102 70 - 130 mg/dL Final     Comment:     Meter: [id number] : [id number] Steve Yancey   11/01/2022 0832 112 70 - 130 mg/dL Final     Comment:     Meter: [id number] : [id number] ADRIENNE MADISON   11/01/2022 0741 156 (H) 70 - 130 mg/dL Final     Comment:     Meter: [id number] : [id number] Steve Yancey   11/01/2022 0723 84 70 - 130 mg/dL Final     Comment:     Meter: [id number] : [id number] Steve Yancey   11/01/2022 0627 84 70 -  130 mg/dL Final     Comment:     Meter: [id number] : [id number] Littcarr Crystal   10/31/2022 2100 228 (H) 70 - 130 mg/dL Final     Comment:     Meter: [id number] : [id number] Littcarr Crystal   10/31/2022 1608 208 (H) 70 - 130 mg/dL Final     Comment:     Meter: [id number] : [id number] lisa ramirez   10/31/2022 1052 163 (H) 70 - 130 mg/dL Final     Comment:     Meter: [id number] : [id number] lisa ramirez     Lab Results   Component Value Date    TSH 2.390 10/10/2022    FREET4 0.87 (L) 10/10/2022     No results found for: PREGTESTUR, PREGSERUM, HCG, HCGQUANT  Pain Management Panel     Pain Management Panel Latest Ref Rng & Units 5/24/2022 5/11/2022    CREATININE UR mg/dL 91.0 -    AMPHETAMINES SCREEN, URINE Negative - Negative    BARBITURATES SCREEN Negative - Negative    BENZODIAZEPINE SCREEN, URINE Negative - Negative    BUPRENORPHINEUR Negative - Negative    COCAINE SCREEN, URINE Negative - Negative    METHADONE SCREEN, URINE Negative - Negative    METHAMPHETAMINEUR Negative - Negative        Brief Urine Lab Results  (Last result in the past 365 days)      Color   Clarity   Blood   Leuk Est   Nitrite   Protein   CREAT   Urine HCG        10/19/22 0746 Yellow   Clear   Negative   Negative   Negative   Negative               No results found for: BLOODCX      No results found for: URINECX  No results found for: WOUNDCX  No results found for: STOOLCX  Results from last 7 days   Lab Units 10/29/22  0112 10/28/22  0210 10/27/22  0122 10/26/22  0401   CRP mg/dL 3.73* 4.00* 5.22* 5.48*       I have personally looked at the labs and they are summarized above.  ----------------------------------------------------------------------------------------------------------------------  Detailed radiology reports for the last 24 hours:    Imaging Results (Last 24 Hours)     ** No results found for the last 24 hours. **        Final impressions for the last 30 days of radiology reports:    CT knee right wo  contrast    Result Date: 10/13/2022  1. Mixed density effusion which does contain air. 2. No evidence of osteomyelitis at the time of the study.  This report was finalized on 10/13/2022 3:08 PM by Dr. Lobo Ring MD.      XR Chest 1 View    Result Date: 10/18/2022  Probable airspace disease in the right lung with low lung volumes. Right PICC line at the level of diaphragm.  Signer Name: Yaquelin Ferreira MD  Signed: 10/18/2022 9:00 PM  Workstation Name: [facility]  Radiology [facility]    XR Chest 1 View    Result Date: 10/6/2022    Unremarkable exam. No acute cardiopulmonary findings identified.  This report was finalized on 10/6/2022 8:24 AM by Dr. Oswaldo Brown MD.      CT Angiogram Chest Pulmonary Embolism    Result Date: 10/13/2022  Elevated right diaphragm with volume loss and atelectasis at the right lung base. Linear scarring at the right lung apex. No definite acute infiltrates. No evidence of pulmonary embolism. Signer Name: Abner Billy MD  Signed: 10/13/2022 11:52 PM  Workstation [facility]  Radiology [facility]    US Venous Doppler Lower Extremity Bilateral (duplex)    Result Date: 10/13/2022  No deep venous thrombus seen in either lower extremity. Signer Name: Abner Billy MD  Signed: 10/13/2022 11:53 PM  Workstation Name: [facility]  [facility]    I have personally looked at the radiology images and read the final radiology report.    Assessment & Plan    Debility--again patient has been somewhat limited in his therapy efforts secondary to orthostatic hypotension.  We will reevaluate this morning and hope to be more aggressive with therapy today.  Patient modified independent for bed mobility did work on lower extremity strengthening exercises yesterday.  Requiring moderate assistance for lower body dressing.    Orthostatic hypotension patient currently on midodrine 10 mg 3 times daily.  Will use abdominal binder when doing therapy for  now.  Reevaluate today    Septic arthritis with recent MRSA bacteremia--TYRESE did not show any findings valvular vegetations.  Currently on daptomycin.    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

## 2022-11-01 NOTE — THERAPY TREATMENT NOTE
Inpatient Rehabilitation - Physical Therapy Treatment Note       YVONNE Gaines     Patient Name: Melchor Hardwick III  : 1965  MRN: [id number]    Today's Date: 2022                    Admit Date: 10/28/2022      Visit Dx:     ICD-10-CM ICD-9-CM   1. Staphylococcal arthritis of right knee (HCC)  M00.061 711.06     041.10       Patient Active Problem List   Diagnosis   • Hyperlipidemia   • Hypertensive disorder   • Obesity   • Type 2 diabetes mellitus with hyperglycemia, with long-term current use of insulin (HCC)   • Gas gangrene of foot (HCC)   • Cellulitis in diabetic foot (HCC)   • Other acute osteomyelitis of left foot (HCC)   • Osteomyelitis (HCC)   • Critical illness myopathy   • Staphylococcal arthritis of right knee (HCC)   • Other cirrhosis of liver (HCC)   • MRSA bacteremia   • Endocarditis of tricuspid valve   • Wound of right buttock   • History of osteomyelitis   • Debility       Past Medical History:   Diagnosis Date   • Diabetes mellitus (HCC)    • Hyperlipidemia    • Hypertension    • Pyogenic arthritis of right knee joint, due to unspecified organism (HCC) 2022       Past Surgical History:   Procedure Laterality Date   • ABSCESS DRAINAGE      PERINEUM   • AMPUTATION FOOT Left 2022    Procedure: AMPUTATION FOOT;  Surgeon: Addison Colby MD;  Location: [facility] OR;  Service: Podiatry;  Laterality: Left;   • INCISION AND DRAINAGE LEG Left 05/10/2022    Procedure: INCISION AND DRAINAGE LOWER EXTREMITY;  Surgeon: Addison Colby MD;  Location:  [facility] OR;  Service: Podiatry;  Laterality: Left;   • KNEE INCISION AND DRAINAGE Right 2022    Procedure: KNEE INCISION AND DRAINAGE RIGHT;  Surgeon: Brain Bentley MD;  Location: [facility] OR;  Service: Orthopedics;  Laterality: Right;   • WOUND DEBRIDEMENT Left 2022    Procedure: DEBRIDEMENT FOOT;  Surgeon: Addison Colby MD;  Location: [facility] OR;  Service: Podiatry;  Laterality: Left;       PT ASSESSMENT (last 12 hours)     IRF PT  Evaluation and Treatment     Row Name 11/01/22 1538          PT Time and Intention    Document Type daily treatment  -RG     Mode of Treatment physical therapy  -RG     Patient/Family/Caregiver Comments/Observations Pt had a procedure in AM.  Pt was very drowsy and completed supine exercises in bed.  Pt stated that he was motivated to get better and would do what he could.  -     Row Name 11/01/22 1538          General Information    Patient Profile Reviewed yes  -RG     Existing Precautions/Restrictions fall;brace worn when out of bed  aircast L LE  -RG     Limitations/Impairments other (see comments)  ortho hypo  -RG     Row Name 11/01/22 1538          Mobility    Extremity Weight-bearing Status left lower extremity;right lower extremity  -RG     Left Lower Extremity (Weight-bearing Status) weight-bearing as tolerated (WBAT);other (see comments)  with brace/air cast  -RG     Right Lower Extremity (Weight-bearing Status) weight-bearing as tolerated (WBAT);other (see comments)  -     Row Name 11/01/22 1538          Bed Mobility    Bed Mobility supine-sit;sit-supine;scooting/bridging;rolling left;rolling right  -RG     Rolling Left Pope (Bed Mobility) modified independence  -RG     Rolling Right Pope (Bed Mobility) modified independence  -RG     Scooting/Bridging Pope (Bed Mobility) modified independence  -RG     Supine-Sit Pope (Bed Mobility) modified independence  -RG     Sit-Supine Pope (Bed Mobility) set up  -     Row Name 11/01/22 1538          Hip (Therapeutic Exercise)    Hip Strengthening (Therapeutic Exercise) bilateral;flexion;external rotation;aDduction;aBduction;internal rotation;heel slides;supine;10 repetitions;2 sets  -     Row Name 11/01/22 1538          Knee (Therapeutic Exercise)    Knee Strengthening (Therapeutic Exercise) bilateral;flexion;extension;heel slides;SAQ (short arc quad);SLR (straight leg raise);supine;10 repetitions;2 sets  -     Row  Name 11/01/22 1538          Ankle (Therapeutic Exercise)    Ankle AAROM (Therapeutic Exercise) bilateral;dorsiflexion;plantarflexion;supine;10 repetitions;2 sets  -     Row Name 11/01/22 1538          Positioning and Restraints    Pre-Treatment Position in bed  -RG     Post Treatment Position bed  -RG     In Bed notified nsg;supine;call light within reach;encouraged to call for assist;legs elevated  -RG     Row Name 11/01/22 1538          Therapy Assessment/Plan (PT)    Rehab Potential/Prognosis (PT) good, to achieve stated therapy goals  -RG     Frequency of Treatment (PT) 5 times per week;90 minutes per session  -RG     Estimated Duration of Therapy (PT) 2 weeks;3 weeks  -RG     Row Name 11/01/22 1538          Therapy Plan Review/Discharge Plan (PT)    Anticipated Discharge Disposition (PT) home with assist;home with supervision;home with home health  -RG     Row Name 11/01/22 1538          IRF PT Goals    Bed Mobility Goal Selection (PT-IRF) bed mobility, PT goal 1  -RG     Gait (Walking Locomotion) Goal Selection (PT-IRF) gait, PT goal 1  -RG     Row Name 11/01/22 1538          Bed Mobility Goal 1 (PT-IRF)    Activity/Assistive Device (Bed Mobility Goal 1, PT-IRF) bed mobility activities, all  -RG     Pearson Level (Bed Mobility Goal 1, PT-IRF) independent  -RG     Time Frame (Bed Mobility Goal 1, PT-IRF) long-term goal (LTG)  -     Row Name 11/01/22 1538          Gait/Walking Locomotion Goal 1 (PT-IRF)    Activity/Assistive Device (Gait/Walking Locomotion Goal 1, PT-IRF) gait (walking locomotion);assistive device use  -RG     Gait/Walking Locomotion Distance Goal 1 (PT-IRF) 30'  -RG     Pearson Level (Gait/Walking Locomotion Goal 1, PT-IRF) standby assist;modified independence  -RG     Time Frame (Gait/Walking Locomotion Goal 1, PT-IRF) long-term goal (LTG)  -RG           User Key  (r) = Recorded By, (t) = Taken By, (c) = Cosigned By    Initials Name Provider Type    RG Michi Mckeon, PTA  Physical Therapist Assistant              Wound 09/21/22 1532 Right anterior knee Incision (Active)   Dressing Appearance open to air 11/01/22 0720   Closure Approximated 11/01/22 0720   Drainage Amount none 11/01/22 0720   Dressing Care open to air 11/01/22 0720       Wound 10/28/22 1827 Right coccyx Pressure Injury (Active)   Dressing Appearance open to air 10/31/22 1908   Base red;non-blanchable 11/01/22 0720   Drainage Amount none 10/31/22 1908   Care, Wound barrier applied 11/01/22 0720   Dressing Care open to air 11/01/22 0720     Physical Therapy Education     Title: PT OT SLP Therapies (Done)     Topic: Physical Therapy (Done)     Point: Mobility training (Done)     Learning Progress Summary           Patient Acceptance, E,D, VU,NR by  at 11/1/2022 1541    Acceptance, E,TB, VU by  at 10/31/2022 2315    Acceptance, E,D, VU,NR by RG at 10/31/2022 1446                   Point: Home exercise program (Done)     Learning Progress Summary           Patient Acceptance, E,D, VU,NR by RG at 11/1/2022 1541    Acceptance, E,TB, VU by  at 10/31/2022 2315    Acceptance, E,D, VU,NR by RG at 10/31/2022 1446                   Point: Body mechanics (Done)     Learning Progress Summary           Patient Acceptance, E,D, VU,NR by RG at 11/1/2022 1541    Acceptance, E,TB, VU by  at 10/31/2022 2315    Acceptance, E,D, VU,NR by RG at 10/31/2022 1446                   Point: Precautions (Done)     Learning Progress Summary           Patient Acceptance, E,D, VU,NR by RG at 11/1/2022 1541    Acceptance, E,TB, VU by  at 10/31/2022 2315    Acceptance, E,D, VU,NR by  at 10/31/2022 1446                               User Key     Initials Effective Dates Name Provider Type Discipline     06/16/21 -  Phyllis Yañez RN Registered Nurse Nurse     06/16/21 -  Michi Mckeon PTA Physical Therapist Assistant PT                PT Recommendation and Plan    Frequency of Treatment (PT): 5 times per week, 90 minutes per session                      Time Calculation:      PT Charges     Row Name 11/01/22 1541             Time Calculation    Start Time 1245  -RG      Stop Time 1330  -RG      Time Calculation (min) 45 min  -RG      PT Received On 11/01/22  -RG         Time Calculation- PT    Total Timed Code Minutes- PT 45 minute(s)  -RG            User Key  (r) = Recorded By, (t) = Taken By, (c) = Cosigned By    Initials Name Provider Type    Michi White PTA Physical Therapist Assistant                Therapy Charges for Today     Code Description Service Date Service Provider Modifiers Qty    73776655445 HC PT THERAPEUTIC ACT EA 15 MIN 10/31/2022 Michi Mckeon PTA GP, CQ 3    43743594403 HC PT THER PROC EA 15 MIN 10/31/2022 Michi Mckeon PTA GP, CQ 3    60604573918 HC PT THERAPEUTIC ACT EA 15 MIN 11/1/2022 Michi Mckeon PTA GP, CQ 1    58528258905 HC PT THER PROC EA 15 MIN 11/1/2022 Michi Mckeon PTA GP, CQ 2                   Michi Mckeon PTA  11/1/2022

## 2022-11-01 NOTE — ANESTHESIA POSTPROCEDURE EVALUATION
Patient: Melchor Hardwick III    Procedure Summary     Date: 11/01/22 Room / Location: [facility] NONINVASIVE LAB    Anesthesia Start: 0842 Anesthesia Stop: 0918    Procedure: ADULT TRANSESOPHAGEAL ECHO (TYRESE) W/ CONT IF NECESSARY PER PROTOCOL Diagnosis:       (Endocarditis)      (Positive Blood Cultures)    Scheduled Providers: Adonay Gibson MD Provider: Long Carter MD    Anesthesia Type: general ASA Status: 3          Anesthesia Type: general    Vitals  No vitals data found for the desired time range.          Post Anesthesia Care and Evaluation    Patient location during evaluation: bedside  Patient participation: complete - patient participated  Level of consciousness: awake and alert  Pain score: 0  Pain management: adequate    Airway patency: patent  Anesthetic complications: No anesthetic complications  PONV Status: controlled  Cardiovascular status: acceptable  Respiratory status: acceptable  Hydration status: acceptable

## 2022-11-01 NOTE — THERAPY TREATMENT NOTE
Inpatient Rehabilitation - Occupational Therapy Treatment Note    YVONNE Gaines     Patient Name: Melchor Hardwick III  : 1965  MRN: [id number]    Today's Date: 2022                 Admit Date: 10/28/2022         ICD-10-CM ICD-9-CM   1. Staphylococcal arthritis of right knee (HCC)  M00.061 711.06     041.10       Patient Active Problem List   Diagnosis   • Hyperlipidemia   • Hypertensive disorder   • Obesity   • Type 2 diabetes mellitus with hyperglycemia, with long-term current use of insulin (HCC)   • Gas gangrene of foot (HCC)   • Cellulitis in diabetic foot (HCC)   • Other acute osteomyelitis of left foot (HCC)   • Osteomyelitis (HCC)   • Critical illness myopathy   • Staphylococcal arthritis of right knee (HCC)   • Other cirrhosis of liver (HCC)   • MRSA bacteremia   • Endocarditis of tricuspid valve   • Wound of right buttock   • History of osteomyelitis   • Debility       Past Medical History:   Diagnosis Date   • Diabetes mellitus (HCC)    • Hyperlipidemia    • Hypertension    • Pyogenic arthritis of right knee joint, due to unspecified organism (HCC) 2022       Past Surgical History:   Procedure Laterality Date   • ABSCESS DRAINAGE      PERINEUM   • AMPUTATION FOOT Left 2022    Procedure: AMPUTATION FOOT;  Surgeon: Addison Colby MD;  Location: [facility] OR;  Service: Podiatry;  Laterality: Left;   • INCISION AND DRAINAGE LEG Left 05/10/2022    Procedure: INCISION AND DRAINAGE LOWER EXTREMITY;  Surgeon: Addison Colby MD;  Location: [facility] OR;  Service: Podiatry;  Laterality: Left;   • KNEE INCISION AND DRAINAGE Right 2022    Procedure: KNEE INCISION AND DRAINAGE RIGHT;  Surgeon: Brain Bentley MD;  Location: [facility] OR;  Service: Orthopedics;  Laterality: Right;   • WOUND DEBRIDEMENT Left 2022    Procedure: DEBRIDEMENT FOOT;  Surgeon: Addison Colby MD;  Location: [facility] OR;  Service: Podiatry;  Laterality: Left;             IRF OT ASSESSMENT FLOWSHEET (last 12 hours)      IRF OT Evaluation and Treatment     Row Name 11/01/22 1413          OT Time and Intention    Document Type daily treatment  -     Mode of Treatment individual therapy;occupational therapy  -KP     Total Minutes, Occupational Therapy 90  -KP     Patient Effort good  -KP     Symptoms Noted During/After Treatment fatigue  -     Row Name 11/01/22 1413          General Information    Patient Profile Reviewed yes  -KP     General Observations of Patient Patient agreeable to therapy. Procedure this am with patient seen for orthostatic hypotension by nursing and cleared for activity up in chair as tolerated. He was seen up in wheelchair in first session and in bed for second session. Lethargic, but able to participate.  -     Existing Precautions/Restrictions fall;brace worn when out of bed  -     Row Name 11/01/22 1413          Pain Assessment    Pretreatment Pain Rating 0/10 - no pain  -     Posttreatment Pain Rating 0/10 - no pain  -     Row Name 11/01/22 1413          Cognition/Psychosocial    Affect/Mental Status (Cognition) WFL  -     Orientation Status (Cognition) oriented x 4  -KP     Follows Commands (Cognition) WFL  -     Personal Safety Interventions gait belt;nonskid shoes/slippers when out of bed  -     Row Name 11/01/22 1413          Lower Body Dressing    [place] Level (Lower Body Dressing) moderate assist (50% patient effort);verbal cues;nonverbal cues (demo/gesture)  -     Assistive Device Use (Lower Body Dressing) reacher  -     Position (Lower Body Dressing) edge of bed sitting;supine  -     Row Name 11/01/22 1413          Bed Mobility    Supine-Sit [place] (Bed Mobility) minimum assist (75% patient effort);1 person assist  -     Row Name 11/01/22 1413          Bed-Chair Transfer    Bed-Chair [place] (Transfers) contact guard;1 person assist;verbal cues;1 person to manage equipment  -     Assistive Device (Bed-Chair Transfers) walker, front-wheeled  -     Row  Name 11/01/22 1413          Motor Skills    Functional Endurance fair  -     Motor Control/Coordination Interventions therapeutic exercise/ROM  tabletop functional reach AROM with BUEs to complete task; general strengthening -wand; 3 lb dumbbell semi-fowlers in bed  -           User Key  (r) = Recorded By, (t) = Taken By, (c) = Cosigned By    Initials Name Effective Dates     Darcy Gramajo, PAYAM 06/16/21 -                  Occupational Therapy Education     Title: PT OT SLP Therapies (Done)     Topic: Occupational Therapy (Done)     Point: ADL training (Done)     Description:   Instruct learner(s) on proper safety adaptation and remediation techniques during self care or transfers.   Instruct in proper use of assistive devices.              Learning Progress Summary           Patient Acceptance, E,TB, VU by DG at 10/31/2022 2315    Acceptance, E, VU,NR by BF at 10/31/2022 1429    Acceptance, E,TB, VU by DL at 10/31/2022 0101    Acceptance, E,TB, VU by DL at 10/29/2022 2321    Acceptance, E, VU,NR by HB at 10/29/2022 1137                   Point: Home exercise program (Done)     Description:   Instruct learner(s) on appropriate technique for monitoring, assisting and/or progressing therapeutic exercises/activities.              Learning Progress Summary           Patient Acceptance, E,TB, VU by DG at 10/31/2022 2315    Acceptance, E,TB, VU by DL at 10/31/2022 0101    Acceptance, E,TB, VU by DL at 10/29/2022 2321    Acceptance, E, VU,NR by HB at 10/29/2022 1137                   Point: Precautions (Done)     Description:   Instruct learner(s) on prescribed precautions during self-care and functional transfers.              Learning Progress Summary           Patient Acceptance, E,TB, VU by DG at 10/31/2022 2315    Acceptance, E, VU,NR by BF at 10/31/2022 1429    Acceptance, E,TB, VU by DL at 10/31/2022 0101    Acceptance, E,TB, VU by DL at 10/29/2022 2321    Acceptance, E, VU,NR by HB at 10/29/2022 1137                    Point: Body mechanics (Done)     Description:   Instruct learner(s) on proper positioning and spine alignment during self-care, functional mobility activities and/or exercises.              Learning Progress Summary           Patient Acceptance, E,TB, VU by DG at 10/31/2022 2315    Acceptance, E,TB, VU by DL at 10/31/2022 0101    Acceptance, E,TB, VU by DL at 10/29/2022 2321    Acceptance, E, VU,NR by HB at 10/29/2022 1137                               User Key     Initials Effective Dates Name Provider Type Discipline    DG 06/16/21 -  Phyllis Yañez, RN Registered Nurse Nurse     06/16/21 -  Mandi Smith OT Occupational Therapist OT    HB 05/25/21 -  Dorothy Rosas OT Occupational Therapist OT    DL 03/16/22 -  Hellen Mcdonnell, RN Registered Nurse Nurse                    OT Recommendation and Plan                         Time Calculation:      Time Calculation- OT     Row Name 11/01/22 1422 11/01/22 1421          Time Calculation- OT    OT Start Time 1340  -KP 1030  -KP     OT Stop Time 1410  -KP 1130  -     OT Time Calculation (min) 30 min  -KP 60 min  -     Total Timed Code Minutes- OT 30 minute(s)  -KP 60 minute(s)  -     OT Received On -- 11/01/22  -           User Key  (r) = Recorded By, (t) = Taken By, (c) = Cosigned By    Initials Name Provider Type     Darcy Gramajo OT Occupational Therapist              Therapy Charges for Today     Code Description Service Date Service Provider Modifiers Qty    78204224593 HC OT SELF CARE/MGMT/TRAIN EA 15 MIN 11/1/2022 Darcy Gramajo OT GO 1    62707854248 HC OT THERAPEUTIC ACT EA 15 MIN 11/1/2022 Darcy Gramajo OT GO 2    59327184069 HC OT THER PROC EA 15 MIN 11/1/2022 Darcy Gramajo OT GO 3                   Darcy Gramajo OT  11/1/2022

## 2022-11-01 NOTE — PROGRESS NOTES
Occupational Therapy:    Physical Therapy: Individual: 45 minutes.    Speech Language Pathology:    Signed by: Michi Mckeon PTA

## 2022-11-01 NOTE — NURSING NOTE
Patient complained of shakiness and feeling jittery . Check blood sugar pt was 84. Spoke with dr rosario instructed to give half of amp of d50 before procedure .

## 2022-11-01 NOTE — PROGRESS NOTES
Rehabilitation Nursing  Inpatient Rehabilitation Plan of Care Note    Plan of Care  Copy from Junie    Pain Management (Active)  Current Status (10/28/2022 5:07:00 PM): potential for increased pain  Weekly Goal: pain at a tolerable level  Discharge Goal: no pain    Body Function Structure    Skin Integrity (Active)  Current Status (10/28/2022 5:02:00 PM): impaired skin integrity  Weekly Goal: improved skin integrity  Discharge Goal: wound healing    Safety    Potential for Injury (Active)  Current Status (10/28/2022 5:07:00 PM): potential for falls  Weekly Goal: no falls  Discharge Goal: no falls    Signed by: Phyllis Yañez, Nurse

## 2022-11-02 LAB
FUNGUS WND CULT: NORMAL
GLUCOSE BLDC GLUCOMTR-MCNC: 139 MG/DL (ref 70–130)
GLUCOSE BLDC GLUCOMTR-MCNC: 196 MG/DL (ref 70–130)
GLUCOSE BLDC GLUCOMTR-MCNC: 237 MG/DL (ref 70–130)
GLUCOSE BLDC GLUCOMTR-MCNC: 362 MG/DL (ref 70–130)
MYCOBACTERIUM SPEC CULT: NORMAL
NIGHT BLUE STAIN TISS: NORMAL

## 2022-11-02 PROCEDURE — 82962 GLUCOSE BLOOD TEST: CPT

## 2022-11-02 PROCEDURE — 99308 SBSQ NF CARE LOW MDM 20: CPT | Performed by: INTERNAL MEDICINE

## 2022-11-02 PROCEDURE — 97110 THERAPEUTIC EXERCISES: CPT

## 2022-11-02 PROCEDURE — 97116 GAIT TRAINING THERAPY: CPT

## 2022-11-02 PROCEDURE — 97530 THERAPEUTIC ACTIVITIES: CPT

## 2022-11-02 PROCEDURE — 97535 SELF CARE MNGMENT TRAINING: CPT

## 2022-11-02 PROCEDURE — 63710000001 INSULIN ASPART PER 5 UNITS: Performed by: FAMILY MEDICINE

## 2022-11-02 PROCEDURE — 99231 SBSQ HOSP IP/OBS SF/LOW 25: CPT | Performed by: FAMILY MEDICINE

## 2022-11-02 PROCEDURE — 63710000001 INSULIN DETEMIR PER 5 UNITS: Performed by: FAMILY MEDICINE

## 2022-11-02 PROCEDURE — 25010000002 DAPTOMYCIN PER 1 MG: Performed by: FAMILY MEDICINE

## 2022-11-02 PROCEDURE — 25010000002 FONDAPARINUX PER 0.5 MG: Performed by: FAMILY MEDICINE

## 2022-11-02 RX ADMIN — HYDROCODONE BITARTRATE AND ACETAMINOPHEN 1 TABLET: 5; 325 TABLET ORAL at 08:47

## 2022-11-02 RX ADMIN — CARBIDOPA AND LEVODOPA 10 MG: 50; 200 TABLET, EXTENDED RELEASE ORAL at 12:00

## 2022-11-02 RX ADMIN — OXYCODONE HYDROCHLORIDE AND ACETAMINOPHEN 500 MG: 500 TABLET ORAL at 08:47

## 2022-11-02 RX ADMIN — ASPIRIN 81 MG: 81 TABLET, COATED ORAL at 08:47

## 2022-11-02 RX ADMIN — PANTOPRAZOLE SODIUM 40 MG: 40 TABLET, DELAYED RELEASE ORAL at 05:39

## 2022-11-02 RX ADMIN — CARBIDOPA AND LEVODOPA 10 MG: 50; 200 TABLET, EXTENDED RELEASE ORAL at 16:40

## 2022-11-02 RX ADMIN — INSULIN DETEMIR 20 UNITS: 100 INJECTION, SOLUTION SUBCUTANEOUS at 09:00

## 2022-11-02 RX ADMIN — LEVOTHYROXINE SODIUM 25 MCG: 0.07 TABLET ORAL at 05:39

## 2022-11-02 RX ADMIN — Medication 150 MG: at 08:47

## 2022-11-02 RX ADMIN — HYDROCODONE BITARTRATE AND ACETAMINOPHEN 1 TABLET: 5; 325 TABLET ORAL at 20:35

## 2022-11-02 RX ADMIN — VITAMINS A AND D OINTMENT 1 APPLICATION: 15.5; 53.4 OINTMENT TOPICAL at 20:35

## 2022-11-02 RX ADMIN — CARBIDOPA AND LEVODOPA 10 MG: 50; 200 TABLET, EXTENDED RELEASE ORAL at 06:32

## 2022-11-02 RX ADMIN — DAPTOMYCIN 500 MG: 500 INJECTION, POWDER, LYOPHILIZED, FOR SOLUTION INTRAVENOUS at 18:40

## 2022-11-02 RX ADMIN — Medication 1 TABLET: at 08:47

## 2022-11-02 RX ADMIN — DOCUSATE SODIUM 100 MG: 100 CAPSULE ORAL at 20:35

## 2022-11-02 RX ADMIN — PREGABALIN 100 MG: 100 CAPSULE ORAL at 08:47

## 2022-11-02 RX ADMIN — INSULIN ASPART 2 UNITS: 100 INJECTION, SOLUTION INTRAVENOUS; SUBCUTANEOUS at 11:59

## 2022-11-02 RX ADMIN — DOCUSATE SODIUM 100 MG: 100 CAPSULE ORAL at 08:47

## 2022-11-02 RX ADMIN — PREGABALIN 100 MG: 100 CAPSULE ORAL at 20:35

## 2022-11-02 RX ADMIN — VITAMINS A AND D OINTMENT 1 APPLICATION: 15.5; 53.4 OINTMENT TOPICAL at 05:41

## 2022-11-02 RX ADMIN — FONDAPARINUX SODIUM 2.5 MG: 2.5 INJECTION, SOLUTION SUBCUTANEOUS at 08:47

## 2022-11-02 RX ADMIN — INSULIN ASPART 8 UNITS: 100 INJECTION, SOLUTION INTRAVENOUS; SUBCUTANEOUS at 16:39

## 2022-11-02 RX ADMIN — INSULIN DETEMIR 20 UNITS: 100 INJECTION, SOLUTION SUBCUTANEOUS at 20:35

## 2022-11-02 NOTE — PROGRESS NOTES
Rehabilitation Nursing  Inpatient Rehabilitation Plan of Care Note    Plan of Care  Pain    Pain Management (Active)  Current Status (10/28/2022 5:07:00 PM): potential for increased pain  Weekly Goal: pain at a tolerable level  Discharge Goal: no pain    Body Function Structure    Skin Integrity (Active)  Current Status (10/28/2022 5:02:00 PM): impaired skin integrity  Weekly Goal: improved skin integrity  Discharge Goal: wound healing    Safety    Potential for Injury (Active)  Current Status (10/28/2022 5:07:00 PM): potential for falls  Weekly Goal: no falls  Discharge Goal: no falls    Signed by: Catracho Yoder RN

## 2022-11-02 NOTE — PROGRESS NOTES
Occupational Therapy: Individual: 90 minutes.    Physical Therapy:    Speech Language Pathology:    Signed by: Darcy Gramajo OT

## 2022-11-02 NOTE — PROGRESS NOTES
[facility]  PROGRESS NOTE     Patient Identification:  Name:  Melchor Hardwick III  Age:  57 y.o.  Sex:  male  :  1965  MRN:  [id number]  Visit Number:  [id number]  ROOM: 110/2S     Primary Care Provider:  Missy Up APRN    Length of stay in inpatient status:  5    Subjective     Chief Compliant:  No chief complaint on file.      History of Presenting Illness: 57-year-old gentleman being followed for debility.  Was recently treated for septic arthritis involving the right knee with MRSA bacteremia.  Patient states he is doing better this morning and is less dizzy and he is actively doing therapy at this time.    Objective     Current Hospital Meds:ascorbic acid, 500 mg, Oral, Daily  aspirin, 81 mg, Oral, Daily  DAPTOmycin, 6 mg/kg (Adjusted), Intravenous, Q24H  docusate sodium, 100 mg, Oral, BID  fondaparinux, 2.5 mg, Subcutaneous, Q24H  Insulin Aspart, 0-9 Units, Subcutaneous, TID AC  insulin detemir, 20 Units, Subcutaneous, Q12H  iron polysaccharides, 150 mg, Oral, Daily  levothyroxine, 25 mcg, Oral, Q AM  midodrine, 10 mg, Oral, TID AC  multivitamin with minerals, 1 tablet, Oral, Daily  pantoprazole, 40 mg, Oral, Q AM  pregabalin, 100 mg, Oral, Q12H    Pharmacy Consult,       ----------------------------------------------------------------------------------------------------------------------  Vital Signs:  Temp:  [97.5 °F (36.4 °C)-98.3 °F (36.8 °C)] 97.9 °F (36.6 °C)  Heart Rate:  [] 66  Resp:  [16-18] 16  BP: ()/(53-71) 116/69  SpO2:  [96 %-100 %] 99 %  on  Flow (L/min):  [2] 2;   Device (Oxygen Therapy): nasal cannula  Body mass index is 28.34 kg/m².    Wt Readings from Last 3 Encounters:   22 89.6 kg (197 lb 8.5 oz)   22 98 kg (216 lb)   22 115 kg (254 lb)     Intake & Output (last 3 days)       10/30 0701  10/31 0700 10/31 0701   07 07 07 0701   0700    P.O. 1800 1560 1320 240    I.V. (mL/kg)   250 (2.8)     IV  Piggyback   25     Total Intake(mL/kg) 1800 (20.1) 1560 (17.4) 1595 (17.8) 240 (2.7)    Urine (mL/kg/hr) 3430 (1.6) 1000 (0.5)      Stool 0 0      Total Output 3430 1000      Net -1630 +560 +1595 +240            Urine Unmeasured Occurrence 1 x 12 x 9 x 1 x    Stool Unmeasured Occurrence 1 x 1 x 1 x         Diet Regular; Thin; Consistent Carbohydrate  ----------------------------------------------------------------------------------------------------------------------  Physical exam:  Constitutional:   No acute distress  HEENT: Normocephalic atraumatic  Neck: Supple   Cardiovascular: Regular rate and rhythm  Pulmonary/Chest: Clear to auscultation  Abdominal: Positive bowel sounds soft.   Musculoskeletal: No obvious arthropathy; right knee no erythema noted.  No effusions noted; left foot has transmetatarsal amputation recently  Neurological: No focal deficits  Skin: No rash  Peripheral vascular:  Genitourinary:  ----------------------------------------------------------------------------------------------------------------------    Last echocardiogram:  Results for orders placed during the hospital encounter of 10/05/22    Adult Transthoracic Echo Complete W/ Cont if Necessary Per Protocol    Interpretation Summary  •  Normal left ventricular cavity size and wall thickness noted. All left ventricular wall segments contract normally.  •  Left ventricular ejection fraction appears to be 61 - 65%.  •  Left ventricular diastolic function is consistent with (grade I) impaired relaxation.  •  The aortic valve exhibits sclerosis. The aortic valve appears trileaflet.  •  No aortic valve regurgitation or stenosis is present. The aortic valve is abnormal in structure.  •  The mitral valve is structurally normal with no significant stenosis present. Trace to mild mitral valve regurgitation is present.  •  There is no evidence of pericardial  effusion.    ----------------------------------------------------------------------------------------------------------------------  Results from last 7 days   Lab Units 10/31/22  0148 10/30/22  0109 10/29/22  0112 10/28/22  0210 10/27/22  0122   CRP mg/dL  --   --  3.73* 4.00* 5.22*   WBC 10*3/mm3 3.12* 3.85 2.98* 3.62 3.92   HEMOGLOBIN g/dL 8.8* 8.2* 8.0* 8.2* 8.5*   HEMATOCRIT % 28.8* 26.9* 26.1* 26.4* 27.1*   MCV fL 84.7 85.1 84.5 85.2 83.4   MCHC g/dL 30.6* 30.5* 30.7* 31.1* 31.4*   PLATELETS 10*3/mm3 102* 96* 90* 106* 110*         Results from last 7 days   Lab Units 10/31/22  0148 10/30/22  0109 10/29/22  0112 10/28/22  0210 10/27/22  0122   SODIUM mmol/L 138 136 133* 136 134*   POTASSIUM mmol/L 4.1 3.9 3.8 3.9 3.9   MAGNESIUM mg/dL  --  2.1 1.8  --   --    CHLORIDE mmol/L 103 102 101 102 102   CO2 mmol/L 26.6 26.1 25.3 24.9 24.6   BUN mg/dL 9 11 10 11 10   CREATININE mg/dL 0.61* 0.55* 0.63* 0.60* 0.60*   CALCIUM mg/dL 8.0* 8.1* 8.2* 8.1* 8.1*   GLUCOSE mg/dL 154* 211* 152* 179* 138*   ALBUMIN g/dL  --   --  2.13* 2.29* 2.32*   BILIRUBIN mg/dL  --   --  0.4 0.4 0.4   ALK PHOS U/L  --   --  289* 311* 335*   AST (SGOT) U/L  --   --  30 33 33   ALT (SGPT) U/L  --   --  26 29 32   Estimated Creatinine Clearance: 150.4 mL/min (A) (by C-G formula based on SCr of 0.61 mg/dL (L)).  No results found for: AMMONIA  Results from last 7 days   Lab Units 10/29/22  0112   CK TOTAL U/L 34             Glucose   Date/Time Value Ref Range Status   11/02/2022 0659 139 (H) 70 - 130 mg/dL Final     Comment:     Meter: [id number] : [id number] Donde   11/01/2022 2020 299 (H) 70 - 130 mg/dL Final     Comment:     Meter: [id number] : [id number] Tracey Crystal   11/01/2022 1557 207 (H) 70 - 130 mg/dL Final     Comment:     Meter: [id number] : [id number] lisa ashley   11/01/2022 1105 117 70 - 130 mg/dL Final     Comment:     Meter: [id number] : [id number] lisa ramirez   11/01/2022 1008 102 70 - 130 mg/dL  Final     Comment:     Meter: [id number] : [id number] Steve Yancey   11/01/2022 0832 112 70 - 130 mg/dL Final     Comment:     Meter: [id number] : [id number] ADRIENNE MADISON   11/01/2022 0741 156 (H) 70 - 130 mg/dL Final     Comment:     Meter: [id number] : [id number] Steve Yancey   11/01/2022 0723 84 70 - 130 mg/dL Final     Comment:     Meter: [id number] : [id number] Steve Yancey     Lab Results   Component Value Date    TSH 2.390 10/10/2022    FREET4 0.87 (L) 10/10/2022     No results found for: PREGTESTUR, PREGSERUM, HCG, HCGQUANT  Pain Management Panel     Pain Management Panel Latest Ref Rng & Units 5/24/2022 5/11/2022    CREATININE UR mg/dL 91.0 -    AMPHETAMINES SCREEN, URINE Negative - Negative    BARBITURATES SCREEN Negative - Negative    BENZODIAZEPINE SCREEN, URINE Negative - Negative    BUPRENORPHINEUR Negative - Negative    COCAINE SCREEN, URINE Negative - Negative    METHADONE SCREEN, URINE Negative - Negative    METHAMPHETAMINEUR Negative - Negative        Brief Urine Lab Results  (Last result in the past 365 days)      Color   Clarity   Blood   Leuk Est   Nitrite   Protein   CREAT   Urine HCG        10/19/22 0746 Yellow   Clear   Negative   Negative   Negative   Negative               No results found for: BLOODCX      No results found for: URINECX  No results found for: WOUNDCX  No results found for: STOOLCX  Results from last 7 days   Lab Units 10/29/22  0112 10/28/22  0210 10/27/22  0122   CRP mg/dL 3.73* 4.00* 5.22*       I have personally looked at the labs and they are summarized above.  ----------------------------------------------------------------------------------------------------------------------  Detailed radiology reports for the last 24 hours:    Imaging Results (Last 24 Hours)     ** No results found for the last 24 hours. **        Final impressions for the last 30 days of radiology reports:    CT knee right wo contrast    Result Date: 10/13/2022  1.  Mixed density effusion which does contain air. 2. No evidence of osteomyelitis at the time of the study.  This report was finalized on 10/13/2022 3:08 PM by Dr. Lobo Ring MD.      XR Chest 1 View    Result Date: 10/18/2022  Probable airspace disease in the right lung with low lung volumes. Right PICC line at the level of diaphragm.  Signer Name: Yaquelin Ferreira MD  Signed: 10/18/2022 9:00 PM  Workstation Name: [facility]  Radiology [facility]    XR Chest 1 View    Result Date: 10/6/2022    Unremarkable exam. No acute cardiopulmonary findings identified.  This report was finalized on 10/6/2022 8:24 AM by Dr. Oswaldo Brown MD.      CT Angiogram Chest Pulmonary Embolism    Result Date: 10/13/2022  Elevated right diaphragm with volume loss and atelectasis at the right lung base. Linear scarring at the right lung apex. No definite acute infiltrates. No evidence of pulmonary embolism. Signer Name: Abner Billy MD  Signed: 10/13/2022 11:52 PM  Workstation Name: [facility]    US Venous Doppler Lower Extremity Bilateral (duplex)    Result Date: 10/13/2022  No deep venous thrombus seen in either lower extremity. Signer Name: Abner Billy MD  Signed: 10/13/2022 11:53 PM  Workstation Name: [facility]  Radiology [facility]    I have personally looked at the radiology images and read the final radiology report.    Assessment & Plan    Debility--patient independent for bed mobility; did lower extremity strengthening exercises yesterday; requiring moderate assistance for lower body dressing; minimum assist for bed mobility and contact-guard for transfers.  Patient less symptomatic from orthostatic hypotension today and hopefully can be much more aggressive in her therapy today.    Orthostatic hypotension currently on midodrine 10 mg 3 times daily.  Appears to be improving    Septic arthritis with recent MRSA bacteremia continue daptomycin.  TYRESE did not show  any valve vegetation findings.    Thrombocytopenia stable    History of cirrhosis secondary to [place]    Diabetes mellitus controlled    Hypothyroidism continue Synthroid    VTE Prophylaxis:   Mechanical Order History:     None      Pharmalogical Order History:      Ordered     Dose Route Frequency Stop    10/28/22 2722  fondaparinux (ARIXTRA) injection 2.5 mg         2.5 mg SC Every 24 Hours Scheduled --                    Joe Mike MD  [facility]  11/02/22  11:04 EDT

## 2022-11-02 NOTE — PLAN OF CARE
Goal Outcome Evaluation:               Resting in bed with no complaints at this time. Continue current plan of care.

## 2022-11-02 NOTE — THERAPY TREATMENT NOTE
Inpatient Rehabilitation - Physical Therapy Treatment Note       YVONNE Gaines     Patient Name: Melchor Hardwick III  : 1965  MRN: [id number]    Today's Date: 2022                    Admit Date: 10/28/2022      Visit Dx:     ICD-10-CM ICD-9-CM   1. Staphylococcal arthritis of right knee (HCC)  M00.061 711.06     041.10       Patient Active Problem List   Diagnosis   • Hyperlipidemia   • Hypertensive disorder   • Obesity   • Type 2 diabetes mellitus with hyperglycemia, with long-term current use of insulin (HCC)   • Gas gangrene of foot (HCC)   • Cellulitis in diabetic foot (HCC)   • Other acute osteomyelitis of left foot (HCC)   • Osteomyelitis (HCC)   • Critical illness myopathy   • Staphylococcal arthritis of right knee (HCC)   • Other cirrhosis of liver (HCC)   • MRSA bacteremia   • Endocarditis of tricuspid valve   • Wound of right buttock   • History of osteomyelitis   • Debility       Past Medical History:   Diagnosis Date   • Diabetes mellitus (HCC)    • Hyperlipidemia    • Hypertension    • Pyogenic arthritis of right knee joint, due to unspecified organism (HCC) 2022       Past Surgical History:   Procedure Laterality Date   • ABSCESS DRAINAGE      PERINEUM   • AMPUTATION FOOT Left 2022    Procedure: AMPUTATION FOOT;  Surgeon: Addison Colby MD;  Location: [facility] OR;  Service: Podiatry;  Laterality: Left;   • INCISION AND DRAINAGE LEG Left 05/10/2022    Procedure: INCISION AND DRAINAGE LOWER EXTREMITY;  Surgeon: Addison Colby MD;  Location:  [facility] OR;  Service: Podiatry;  Laterality: Left;   • KNEE INCISION AND DRAINAGE Right 2022    Procedure: KNEE INCISION AND DRAINAGE RIGHT;  Surgeon: Brain Bentley MD;  Location: [facility] OR;  Service: Orthopedics;  Laterality: Right;   • WOUND DEBRIDEMENT Left 2022    Procedure: DEBRIDEMENT FOOT;  Surgeon: Addison Colby MD;  Location: [facility] OR;  Service: Podiatry;  Laterality: Left;       PT ASSESSMENT (last 12 hours)     IRF PT  Evaluation and Treatment     Row Name 11/02/22 1400          PT Time and Intention    Document Type daily treatment  -RG     Mode of Treatment physical therapy  -RG     Patient/Family/Caregiver Comments/Observations Pt was able to sit up in wc today for 1 PT session with BP monitored.  -     Row Name 11/02/22 1400          General Information    Patient Profile Reviewed yes  -RG     Existing Precautions/Restrictions fall;brace worn when out of bed  aircast L LE  -RG     Limitations/Impairments other (see comments)  ortho hypo  -RG     Row Name 11/02/22 1400          Cognition/Psychosocial    Personal Safety Interventions gait belt;nonskid shoes/slippers when out of bed;fall prevention program maintained  -     Row Name 11/02/22 1400          Mobility    Extremity Weight-bearing Status left lower extremity;right lower extremity  -RG     Left Lower Extremity (Weight-bearing Status) weight-bearing as tolerated (WBAT);other (see comments)  with brace/air cast  -RG     Right Lower Extremity (Weight-bearing Status) weight-bearing as tolerated (WBAT);other (see comments)  -     Row Name 11/02/22 1400          Bed Mobility    Bed Mobility supine-sit;sit-supine;scooting/bridging;rolling left;rolling right  -RG     Rolling Left [place] (Bed Mobility) modified independence  -RG     Rolling Right [place] (Bed Mobility) modified independence  -RG     Scooting/Bridging [place] (Bed Mobility) modified independence  -RG     Supine-Sit [place] (Bed Mobility) modified independence  -RG     Sit-Supine [place] (Bed Mobility) set up  -     Row Name 11/02/22 1400          Sit-Stand Transfer    Sit-Stand [place] (Transfers) nonverbal cues (demo/gesture);verbal cues;minimum assist (75% patient effort)  -RG     Assistive Device (Sit-Stand Transfers) parallel bars  -     Row Name 11/02/22 1400          Stand-Sit Transfer    Stand-Sit [place] (Transfers) contact guard;verbal cues;nonverbal cues  "(demo/gesture)  -RG     Assistive Device (Stand-Sit Transfers) wheelchair;parallel bars  -RG     Row Name 11/02/22 1400          Gait/Stairs (Locomotion)    [place] Level (Gait) verbal cues;nonverbal cues (demo/gesture);contact guard  -RG     Assistive Device (Gait) parallel bars  -RG     Distance in Feet (Gait) 3'  -RG     Comment, (Gait/Stairs) c/o \"dizziness\"  -RG     Row Name 11/02/22 1400          Hip (Therapeutic Exercise)    Hip Strengthening (Therapeutic Exercise) bilateral;flexion;aBduction;aDduction;external rotation;internal rotation;heel slides;marching while seated;sitting;supine;2 lb free weight;resistance band;green;10 repetitions;2 sets  -RG     Row Name 11/02/22 1400          Knee (Therapeutic Exercise)    Knee Strengthening (Therapeutic Exercise) bilateral;flexion;extension;heel slides;marching while seated;SLR (straight leg raise);SAQ (short arc quad);LAQ (long arc quad);sitting;supine;2 lb free weight;resistance band;green;10 repetitions;2 sets  -RG     Row Name 11/02/22 1400          Ankle (Therapeutic Exercise)    Ankle AAROM (Therapeutic Exercise) bilateral;dorsiflexion;plantarflexion;sitting;supine  -RG     Row Name 11/02/22 1400          Positioning and Restraints    Pre-Treatment Position sitting in chair/recliner  -RG     Post Treatment Position bed  -RG     In Bed notified nsg;supine;call light within reach;encouraged to call for assist;legs elevated  -RG     Row Name 11/02/22 1400          Vital Signs    Pre Systolic BP Rehab 93  -RG     Pre Treatment Diastolic BP 63  -RG     Intra Systolic BP Rehab 85  -RG     Intra Treatment Diastolic BP 64  -RG     Post Systolic BP Rehab 99  -RG     Post Treatment Diastolic BP 64  -RG     Pre Patient Position Sitting  -RG     Intra Patient Position Standing  -RG     Post Patient Position Sitting  -RG     Row Name 11/02/22 1400          Therapy Assessment/Plan (PT)    Rehab Potential/Prognosis (PT) good, to achieve stated therapy goals  -RG     " Frequency of Treatment (PT) 5 times per week;90 minutes per session  -     Estimated Duration of Therapy (PT) 2 weeks;3 weeks  -     Row Name 11/02/22 1400          Therapy Plan Review/Discharge Plan (PT)    Anticipated Discharge Disposition (PT) home with assist;home with supervision;home with home health  -     Row Name 11/02/22 1400          IRF PT Goals    Bed Mobility Goal Selection (PT-IRF) bed mobility, PT goal 1  -RG     Gait (Walking Locomotion) Goal Selection (PT-IRF) gait, PT goal 1  -     Row Name 11/02/22 1400          Bed Mobility Goal 1 (PT-IRF)    Activity/Assistive Device (Bed Mobility Goal 1, PT-IRF) bed mobility activities, all  -RG     [place] Level (Bed Mobility Goal 1, PT-IRF) independent  -RG     Time Frame (Bed Mobility Goal 1, PT-IRF) long-term goal (LTG)  -     Row Name 11/02/22 1400          Gait/Walking Locomotion Goal 1 (PT-IRF)    Activity/Assistive Device (Gait/Walking Locomotion Goal 1, PT-IRF) gait (walking locomotion);assistive device use  -RG     Gait/Walking Locomotion Distance Goal 1 (PT-IRF) 30'  -RG     [place] Level (Gait/Walking Locomotion Goal 1, PT-IRF) standby assist;modified independence  -RG     Time Frame (Gait/Walking Locomotion Goal 1, PT-IRF) long-term goal (LTG)  -RG           User Key  (r) = Recorded By, (t) = Taken By, (c) = Cosigned By    Initials Name Provider Type     Michi Mckeon PTA Physical Therapist Assistant              Wound 09/21/22 1532 Right anterior knee Incision (Active)   Dressing Appearance open to air 11/02/22 0847   Closure Approximated 11/02/22 0847   Base dry;clean 11/02/22 0847   Drainage Amount none 11/01/22 1908   Dressing Care open to air 11/02/22 0847       Wound 10/28/22 1827 Right coccyx Pressure Injury (Active)   Dressing Appearance open to air 11/02/22 0847   Closure None 11/02/22 0847   Base red;non-blanchable 11/02/22 0847   Drainage Amount none 11/02/22 0847   Care, Wound barrier applied;pressure removed  11/02/22 0847   Dressing Care open to air 11/02/22 0847     Physical Therapy Education     Title: PT OT SLP Therapies (Done)     Topic: Physical Therapy (Done)     Point: Mobility training (Done)     Learning Progress Summary           Patient Acceptance, E,TB, VU by  at 11/1/2022 2016    Acceptance, E,D, VU,NR by RG at 11/1/2022 1541    Acceptance, E,TB, VU by  at 10/31/2022 2315    Acceptance, E,D, VU,NR by RG at 10/31/2022 1446                   Point: Home exercise program (Done)     Learning Progress Summary           Patient Acceptance, E,TB, VU by DG at 11/1/2022 2016    Acceptance, E,D, VU,NR by RG at 11/1/2022 1541    Acceptance, E,TB, VU by  at 10/31/2022 2315    Acceptance, E,D, VU,NR by RG at 10/31/2022 1446                   Point: Body mechanics (Done)     Learning Progress Summary           Patient Acceptance, E,TB, VU by  at 11/1/2022 2016    Acceptance, E,D, VU,NR by RG at 11/1/2022 1541    Acceptance, E,TB, VU by DG at 10/31/2022 2315    Acceptance, E,D, VU,NR by RG at 10/31/2022 1446                   Point: Precautions (Done)     Learning Progress Summary           Patient Acceptance, E,TB, VU by  at 11/1/2022 2016    Acceptance, E,D, VU,NR by RG at 11/1/2022 1541    Acceptance, E,TB, VU by  at 10/31/2022 2315    Acceptance, E,D, VU,NR by RG at 10/31/2022 1446                               User Key     Initials Effective Dates Name Provider Type Discipline     06/16/21 -  Phyllis Yañez RN Registered Nurse Nurse     06/16/21 -  Michi Mckeon PTA Physical Therapist Assistant PT                PT Recommendation and Plan    Frequency of Treatment (PT): 5 times per week, 90 minutes per session                     Time Calculation:      PT Charges     Row Name 11/02/22 1435 11/02/22 1434          Time Calculation    Start Time 1330  - 0915  -     Stop Time 1415  -RG 1000  -     Time Calculation (min) 45 min  - 45 min  -RG     PT Received On 11/02/22  -RG 11/02/22  -RG         Time Calculation- PT    Total Timed Code Minutes- PT 45 minute(s)  -RG 45 minute(s)  -RG           User Key  (r) = Recorded By, (t) = Taken By, (c) = Cosigned By    Initials Name Provider Type    Michi White PTA Physical Therapist Assistant                Therapy Charges for Today     Code Description Service Date Service Provider Modifiers Qty    66517732038 HC PT THERAPEUTIC ACT EA 15 MIN 11/1/2022 Michi Mckeon PTA GP, CQ 1    63216535121 HC PT THER PROC EA 15 MIN 11/1/2022 Michi Mckeon PTA GP, CQ 2    96787837692 HC GAIT TRAINING EA 15 MIN 11/2/2022 Michi Mckeon PTA GP, CQ 1    16779794182 HC PT THERAPEUTIC ACT EA 15 MIN 11/2/2022 Michi Mckeon PTA GP, CQ 2    81822799809 HC PT THER PROC EA 15 MIN 11/2/2022 Michi Mckeon PTA GP, CQ 3                   Michi Mckeon PTA  11/2/2022

## 2022-11-02 NOTE — PROGRESS NOTES
PROGRESS NOTE         Patient Identification:  Name:  Melchor Hardwick III  Age:  57 y.o.  Sex:  male  :  1965  MRN:  [id number]  Visit Number:  [id number]  Primary Care Provider:  Missy Up APRN         LOS: 5 days       ----------------------------------------------------------------------------------------------------------------------  Subjective       Chief Complaints:    No chief complaint on file.        Interval History:      Patient sitting up in chair working with occupational therapy this morning.  Overall doing well today.  No issues or complaints.  Currently on room air with no apparent distress.  TYRESE from 2022 showed no evidence of vegetation.  Afebrile, denies diarrhea.    Review of Systems:    Constitutional: no fever, chills and night sweats.  Generalized fatigue.  Eyes: no eye drainage, itching or redness.  HEENT: no mouth sores, dysphagia or nose bleed.  Respiratory: no for shortness of breath, cough or production of sputum.  Cardiovascular: no chest pain, no palpitations, no orthopnea.  Gastrointestinal: no nausea, vomiting or diarrhea. No abdominal pain, hematemesis or rectal bleeding.  Genitourinary: no dysuria or polyuria.  Hematologic/lymphatic: no lymph node abnormalities, no easy bruising or easy bleeding.  Musculoskeletal: no muscle or joint pain.  Skin: No rash and no itching.  Neurological: no loss of consciousness, no seizure, no headache.  Behavioral/Psych: no depression or suicidal ideation.  Endocrine: no hot flashes.  Immunologic: negative.    ----------------------------------------------------------------------------------------------------------------------      Objective       Current [facility] Meds:  ascorbic acid, 500 mg, Oral, Daily  aspirin, 81 mg, Oral, Daily  DAPTOmycin, 6 mg/kg (Adjusted), Intravenous, Q24H  docusate sodium, 100 mg, Oral, BID  fondaparinux, 2.5 mg, Subcutaneous, Q24H  Insulin Aspart, 0-9 Units, Subcutaneous, TID AC  insulin  detemir, 20 Units, Subcutaneous, Q12H  iron polysaccharides, 150 mg, Oral, Daily  levothyroxine, 25 mcg, Oral, Q AM  midodrine, 10 mg, Oral, TID AC  multivitamin with minerals, 1 tablet, Oral, Daily  pantoprazole, 40 mg, Oral, Q AM  pregabalin, 100 mg, Oral, Q12H      Pharmacy Consult,       ----------------------------------------------------------------------------------------------------------------------    Vital Signs:  Temp:  [97.5 °F (36.4 °C)-98.3 °F (36.8 °C)] 97.9 °F (36.6 °C)  Heart Rate:  [] 66  Resp:  [12-18] 16  BP: ()/(53-77) 116/69  No data found.  SpO2 Percentage    11/01/22 1230 11/01/22 1900 11/02/22 0700   SpO2: 99% 96% 99%     SpO2:  [96 %-100 %] 99 %  on  Flow (L/min):  [2] 2;   Device (Oxygen Therapy): nasal cannula    Body mass index is 28.34 kg/m².  Wt Readings from Last 3 Encounters:   11/01/22 89.6 kg (197 lb 8.5 oz)   09/23/22 98 kg (216 lb)   06/17/22 115 kg (254 lb)        Intake/Output Summary (Last 24 hours) at 11/2/2022 0923  Last data filed at 11/2/2022 0451  Gross per 24 hour   Intake 1320 ml   Output --   Net 1320 ml     Diet Regular; Thin; Consistent Carbohydrate  ----------------------------------------------------------------------------------------------------------------------      Physical Exam:    Constitutional: Elderly, chronically ill-appearing.  Currently on room air with no apparent distress.    HENT:  Head: Normocephalic and atraumatic.  Mouth:  Moist mucous membranes.    Eyes:  Conjunctivae and EOM are normal.  No scleral icterus.  Neck:  Neck supple.  No JVD present.    Cardiovascular:  Normal rate, regular rhythm and normal heart sounds with no murmur. No edema.  Pulmonary/Chest:  No respiratory distress, no wheezes, no crackles, with normal breath sounds and good air movement.  Abdominal:  Soft.  Bowel sounds are normal.  No distension and no tenderness.   Musculoskeletal:  No edema, no tenderness, and no deformity.  No swelling or redness of  joints.  Neurological:  Alert and oriented to person, place, and time.  No facial droop.  No slurred speech.   Skin:  Skin is warm and dry.  No rash noted.  No pallor.  Right knee surgical incision healing appropriately with no signs of infection.   Psychiatric:  Normal mood and affect.  Behavior is normal.        ----------------------------------------------------------------------------------------------------------------------  Results from last 7 days   Lab Units 10/29/22  0112   CK TOTAL U/L 34           Results from last 7 days   Lab Units 10/31/22  0148 10/30/22  0109 10/29/22  0112 10/28/22  0210 10/27/22  0122   CRP mg/dL  --   --  3.73* 4.00* 5.22*   WBC 10*3/mm3 3.12* 3.85 2.98* 3.62 3.92   HEMOGLOBIN g/dL 8.8* 8.2* 8.0* 8.2* 8.5*   HEMATOCRIT % 28.8* 26.9* 26.1* 26.4* 27.1*   MCV fL 84.7 85.1 84.5 85.2 83.4   MCHC g/dL 30.6* 30.5* 30.7* 31.1* 31.4*   PLATELETS 10*3/mm3 102* 96* 90* 106* 110*     Results from last 7 days   Lab Units 10/31/22  0148 10/30/22  0109 10/29/22  0112 10/28/22  0210 10/27/22  0122   SODIUM mmol/L 138 136 133* 136 134*   POTASSIUM mmol/L 4.1 3.9 3.8 3.9 3.9   MAGNESIUM mg/dL  --  2.1 1.8  --   --    CHLORIDE mmol/L 103 102 101 102 102   CO2 mmol/L 26.6 26.1 25.3 24.9 24.6   BUN mg/dL 9 11 10 11 10   CREATININE mg/dL 0.61* 0.55* 0.63* 0.60* 0.60*   CALCIUM mg/dL 8.0* 8.1* 8.2* 8.1* 8.1*   GLUCOSE mg/dL 154* 211* 152* 179* 138*   ALBUMIN g/dL  --   --  2.13* 2.29* 2.32*   BILIRUBIN mg/dL  --   --  0.4 0.4 0.4   ALK PHOS U/L  --   --  289* 311* 335*   AST (SGOT) U/L  --   --  30 33 33   ALT (SGPT) U/L  --   --  26 29 32   Estimated Creatinine Clearance: 150.4 mL/min (A) (by C-G formula based on SCr of 0.61 mg/dL (L)).  No results found for: AMMONIA    Glucose   Date/Time Value Ref Range Status   11/02/2022 0659 139 (H) 70 - 130 mg/dL Final     Comment:     Meter: [id number] : [id number] Hobart Crystal   11/01/2022 2020 299 (H) 70 - 130 mg/dL Final     Comment:     Meter:  [id number] : [id number] Hamer Crystal   11/01/2022 1557 207 (H) 70 - 130 mg/dL Final     Comment:     Meter: [id number] : [id number] lisa ramirez   11/01/2022 1105 117 70 - 130 mg/dL Final     Comment:     Meter: [id number] : [id number] lisa ramirez   11/01/2022 1008 102 70 - 130 mg/dL Final     Comment:     Meter: [id number] : [id number] Stevethiago Yancey   11/01/2022 0832 112 70 - 130 mg/dL Final     Comment:     Meter: [id number] : [id number] ADRIENNE MADISON   11/01/2022 0741 156 (H) 70 - 130 mg/dL Final     Comment:     Meter: [id number] : [id number] Steve Naye   11/01/2022 0723 84 70 - 130 mg/dL Final     Comment:     Meter: [id number] : [id number] Steve Yancey     Lab Results   Component Value Date    HGBA1C 8.80 (H) 09/20/2022     Lab Results   Component Value Date    TSH 2.390 10/10/2022    FREET4 0.87 (L) 10/10/2022       Blood Culture   Date Value Ref Range Status   10/24/2022 No growth at 5 days  Final   10/24/2022 No growth at 5 days  Final     No results found for: URINECX  No results found for: WOUNDCX  No results found for: STOOLCX  No results found for: RESPCX  Pain Management Panel     Pain Management Panel Latest Ref Rng & Units 5/24/2022 5/11/2022    CREATININE UR mg/dL 91.0 -    AMPHETAMINES SCREEN, URINE Negative - Negative    BARBITURATES SCREEN Negative - Negative    BENZODIAZEPINE SCREEN, URINE Negative - Negative    BUPRENORPHINEUR Negative - Negative    COCAINE SCREEN, URINE Negative - Negative    METHADONE SCREEN, URINE Negative - Negative    METHAMPHETAMINEUR Negative - Negative            ----------------------------------------------------------------------------------------------------------------------  Imaging Results (Last 24 Hours)     ** No results found for the last 24 hours. **          ----------------------------------------------------------------------------------------------------------------------    Pertinent Infectious Disease  Results    CK from 10/29/2022 normal at 34.  COVID-19 PCR from 10/28/2022 negative.  Blood cultures from 10/18/2022 2 out of 2 sets positive for MRSA.  Blood cultures from 10/20/2022 2 out of 2 sets positive for MRSA.  Blood cultures from 10/22/2022 1 out of 2 sets positive for MRSA.  Blood cultures from 10/24/2022 show no growth thus far.  2D echo from 10/24/2022 reports no evidence of vegetation.        Assessment/Plan       Assessment     Right knee septic arthritis  MRSA bacteremia  Rule out TV endocarditis      Plan      I saw and examined the patient myself this morning with ROSALINA Caballero with whom I discussed the plan of care and primary RN and here are my findings:    Patient is working with occupational therapy and physical therapy this morning and is going to try to stand up on parallel bars and overall seems to be doing very well without any evidence of acute distress or relapsing sepsis.  He denies any diarrhea and his TYRESE was performed and showed no evidence suggestive of valvular vegetation.    Heart regular rhythm and rate no murmur lungs are clear to auscultation and abdomen is soft with no tenderness and extremities with no edema and knee overall looks great with no erythema and no drainage.    In the setting of negative TYRESE would recommend to continue daptomycin through 11/7/2022 which is 2 weeks from the first negative blood culture.      Code Status:   Code Status and Medical Interventions:   Ordered at: 10/28/22 1742     Code Status (Patient has no pulse and is not breathing):    CPR (Attempt to Resuscitate)     Medical Interventions (Patient has pulse or is breathing):    Full Support       ROSALINA Caballero  11/02/22  09:23 EDT     Electronically signed by ROSALINA Caballero, 11/02/22, 9:24 AM EDT.  Electronically signed by Marilynn Giordano MD, 11/02/22, 12:10 PM EDT.

## 2022-11-02 NOTE — THERAPY TREATMENT NOTE
Inpatient Rehabilitation - Occupational Therapy Treatment Note    YVONNE Gaines     Patient Name: Melchor Hardwick III  : 1965  MRN: [id number]    Today's Date: 2022                 Admit Date: 10/28/2022         ICD-10-CM ICD-9-CM   1. Staphylococcal arthritis of right knee (HCC)  M00.061 711.06     041.10       Patient Active Problem List   Diagnosis   • Hyperlipidemia   • Hypertensive disorder   • Obesity   • Type 2 diabetes mellitus with hyperglycemia, with long-term current use of insulin (HCC)   • Gas gangrene of foot (HCC)   • Cellulitis in diabetic foot (HCC)   • Other acute osteomyelitis of left foot (HCC)   • Osteomyelitis (HCC)   • Critical illness myopathy   • Staphylococcal arthritis of right knee (HCC)   • Other cirrhosis of liver (HCC)   • MRSA bacteremia   • Endocarditis of tricuspid valve   • Wound of right buttock   • History of osteomyelitis   • Debility       Past Medical History:   Diagnosis Date   • Diabetes mellitus (HCC)    • Hyperlipidemia    • Hypertension    • Pyogenic arthritis of right knee joint, due to unspecified organism (HCC) 2022       Past Surgical History:   Procedure Laterality Date   • ABSCESS DRAINAGE      PERINEUM   • AMPUTATION FOOT Left 2022    Procedure: AMPUTATION FOOT;  Surgeon: Addison Colby MD;  Location: [facility] OR;  Service: Podiatry;  Laterality: Left;   • INCISION AND DRAINAGE LEG Left 05/10/2022    Procedure: INCISION AND DRAINAGE LOWER EXTREMITY;  Surgeon: Addison Colby MD;  Location: [facility] OR;  Service: Podiatry;  Laterality: Left;   • KNEE INCISION AND DRAINAGE Right 2022    Procedure: KNEE INCISION AND DRAINAGE RIGHT;  Surgeon: Brain Bentley MD;  Location: [facility] OR;  Service: Orthopedics;  Laterality: Right;   • WOUND DEBRIDEMENT Left 2022    Procedure: DEBRIDEMENT FOOT;  Surgeon: Addison Colby MD;  Location: [facility] OR;  Service: Podiatry;  Laterality: Left;             IRF OT ASSESSMENT FLOWSHEET (last 12 hours)      IRF OT Evaluation and Treatment     Row Name 11/02/22 1502          OT Time and Intention    Document Type daily treatment  -     Mode of Treatment individual therapy;occupational therapy  -KP     Total Minutes, Occupational Therapy 90  -KP     Patient Effort good  -KP     Symptoms Noted During/After Treatment none  -     Row Name 11/02/22 1502          General Information    Patient Profile Reviewed yes  -KP     General Observations of Patient Patient agreeable to therapy. He presents with left shoulder pain along scapular border but improved with gentle ROM/massage.  -     Existing Precautions/Restrictions fall;brace worn when out of bed  -KP     Limitations/Impairments other (see comments)  -KP     Comment, General Information orthostatic hypotension  -     Row Name 11/02/22 1502          Pain Assessment    Pretreatment Pain Rating 3/10  -KP     Posttreatment Pain Rating 1/10  -     Pain Location - other (see comments)  left scapula  -     Row Name 11/02/22 1502          Cognition/Psychosocial    Affect/Mental Status (Cognition) WFL  -     Orientation Status (Cognition) oriented x 4  -KP     Follows Commands (Cognition) WFL  -     Personal Safety Interventions gait belt;nonskid shoes/slippers when out of bed  -     Row Name 11/02/22 1502          Grooming    [place] Level (Grooming) grooming skills;set up  -     Position (Grooming) supported sitting  -     Row Name 11/02/22 1502          Bed Mobility    Supine-Sit [place] (Bed Mobility) modified independence  -     Row Name 11/02/22 1502          Bed-Chair Transfer    Bed-Chair [place] (Transfers) contact guard;1 person assist;verbal cues;1 person to manage equipment  -     Assistive Device (Bed-Chair Transfers) walker, front-wheeled  -     Row Name 11/02/22 1502          Motor Skills    Functional Endurance fair plus  -     Motor Control/Coordination Interventions therapeutic exercise/ROM;fine motor  manipulation/dexterity activities;gross motor coordination activities  BUE PRE 15 minutes with rest breaks; bilateral padmini; functional reach [facility] tabletop activities; 3lbs dower adryan through functional movement patterns  -     Row Name 11/02/22 1502          Positioning and Restraints    Pre-Treatment Position in bed  -     Post Treatment Position bed  -     In Bed call light within reach  -     Row Name 11/02/22 1502          Daily Progress Summary (OT)    Overall Progress Toward Functional Goals (OT) progressing toward functional goals as expected  -           User Key  (r) = Recorded By, (t) = Taken By, (c) = Cosigned By    Initials Name Effective Dates     Darcy Gramajo, OT 06/16/21 -                  Occupational Therapy Education     Title: PT OT SLP Therapies (Done)     Topic: Occupational Therapy (Done)     Point: ADL training (Done)     Description:   Instruct learner(s) on proper safety adaptation and remediation techniques during self care or transfers.   Instruct in proper use of assistive devices.              Learning Progress Summary           Patient Acceptance, E,TB, VU by DG at 11/1/2022 2016    Acceptance, E,TB, VU by DG at 10/31/2022 2315    Acceptance, E, VU,NR by BF at 10/31/2022 1429    Acceptance, E,TB, VU by DL at 10/31/2022 0101    Acceptance, E,TB, VU by DL at 10/29/2022 2321    Acceptance, E, VU,NR by HB at 10/29/2022 1137                   Point: Home exercise program (Done)     Description:   Instruct learner(s) on appropriate technique for monitoring, assisting and/or progressing therapeutic exercises/activities.              Learning Progress Summary           Patient Acceptance, E,TB, VU by DG at 11/1/2022 2016    Acceptance, E,TB, VU by DG at 10/31/2022 2315    Acceptance, E,TB, VU by DL at 10/31/2022 0101    Acceptance, E,TB, VU by DL at 10/29/2022 2321    Acceptance, E, VU,NR by HB at 10/29/2022 1137                   Point: Precautions (Done)     Description:    Instruct learner(s) on prescribed precautions during self-care and functional transfers.              Learning Progress Summary           Patient Acceptance, E,TB, VU by DG at 11/1/2022 2016    Acceptance, E,TB, VU by DG at 10/31/2022 2315    Acceptance, E, VU,NR by BF at 10/31/2022 1429    Acceptance, E,TB, VU by DL at 10/31/2022 0101    Acceptance, E,TB, VU by DL at 10/29/2022 2321    Acceptance, E, VU,NR by HB at 10/29/2022 1137                   Point: Body mechanics (Done)     Description:   Instruct learner(s) on proper positioning and spine alignment during self-care, functional mobility activities and/or exercises.              Learning Progress Summary           Patient Acceptance, E,TB, VU by DG at 11/1/2022 2016    Acceptance, E,TB, VU by DG at 10/31/2022 2315    Acceptance, E,TB, VU by DL at 10/31/2022 0101    Acceptance, E,TB, VU by DL at 10/29/2022 2321    Acceptance, E, VU,NR by HB at 10/29/2022 1137                               User Key     Initials Effective Dates Name Provider Type Discipline     06/16/21 -  Phyllis Yañez, RN Registered Nurse Nurse    BF 06/16/21 -  Mandi Smith, OT Occupational Therapist OT    HB 05/25/21 -  Dorothy Rosas OT Occupational Therapist OT    DL 03/16/22 -  Hellen Mcdonnell, RN Registered Nurse Nurse                    OT Recommendation and Plan            Daily Progress Summary (OT)  Overall Progress Toward Functional Goals (OT): progressing toward functional goals as expected            Time Calculation:      Time Calculation- OT     Row Name 11/02/22 1516 11/02/22 1515          Time Calculation- OT    OT Start Time 1415  -KP 0800  -KP     OT Stop Time 1430  -KP 0915  -KP     OT Time Calculation (min) 15 min  -KP 75 min  -KP     Total Timed Code Minutes- OT 15 minute(s)  -KP 75 minute(s)  -KP           User Key  (r) = Recorded By, (t) = Taken By, (c) = Cosigned By    Initials Name Provider Type    Darcy Tineo, OT Occupational Therapist               Therapy Charges for Today     Code Description Service Date Service Provider Modifiers Qty    31492627554 HC OT SELF CARE/MGMT/TRAIN EA 15 MIN 11/1/2022 Darcy Gramajo OT GO 1    40305884558 HC OT THERAPEUTIC ACT EA 15 MIN 11/1/2022 Darcy Gramajo, OT GO 2    72467106092 HC OT THER PROC EA 15 MIN 11/1/2022 Darcy Gramajo, OT GO 3    51297953028 HC OT SELF CARE/MGMT/TRAIN EA 15 MIN 11/2/2022 Darcy Gramajo OT GO 1    11462808803 HC OT THERAPEUTIC ACT EA 15 MIN 11/2/2022 Darcy Gramajo OT GO 2    50892668421 HC OT THER PROC EA 15 MIN 11/2/2022 Darcy Gramajo, OT GO 3                   Darcy Gramajo OT  11/2/2022

## 2022-11-02 NOTE — PLAN OF CARE
Goal Outcome Evaluation:  Plan of Care Reviewed With: patient        Progress: improving  Outcome Evaluation: Patient up and participating with therapies. Making progress. Continue with plan of care.

## 2022-11-03 LAB
GLUCOSE BLDC GLUCOMTR-MCNC: 202 MG/DL (ref 70–130)
GLUCOSE BLDC GLUCOMTR-MCNC: 249 MG/DL (ref 70–130)
GLUCOSE BLDC GLUCOMTR-MCNC: 302 MG/DL (ref 70–130)
GLUCOSE BLDC GLUCOMTR-MCNC: 87 MG/DL (ref 70–130)

## 2022-11-03 PROCEDURE — 82962 GLUCOSE BLOOD TEST: CPT

## 2022-11-03 PROCEDURE — 25010000002 DAPTOMYCIN PER 1 MG: Performed by: FAMILY MEDICINE

## 2022-11-03 PROCEDURE — 97112 NEUROMUSCULAR REEDUCATION: CPT

## 2022-11-03 PROCEDURE — 99308 SBSQ NF CARE LOW MDM 20: CPT | Performed by: PHYSICIAN ASSISTANT

## 2022-11-03 PROCEDURE — 97110 THERAPEUTIC EXERCISES: CPT

## 2022-11-03 PROCEDURE — 63710000001 INSULIN ASPART PER 5 UNITS: Performed by: FAMILY MEDICINE

## 2022-11-03 PROCEDURE — 97535 SELF CARE MNGMENT TRAINING: CPT

## 2022-11-03 PROCEDURE — 97530 THERAPEUTIC ACTIVITIES: CPT

## 2022-11-03 PROCEDURE — 25010000002 FONDAPARINUX PER 0.5 MG: Performed by: FAMILY MEDICINE

## 2022-11-03 PROCEDURE — 63710000001 INSULIN DETEMIR PER 5 UNITS: Performed by: FAMILY MEDICINE

## 2022-11-03 PROCEDURE — 99231 SBSQ HOSP IP/OBS SF/LOW 25: CPT | Performed by: FAMILY MEDICINE

## 2022-11-03 RX ADMIN — INSULIN ASPART 4 UNITS: 100 INJECTION, SOLUTION INTRAVENOUS; SUBCUTANEOUS at 11:35

## 2022-11-03 RX ADMIN — Medication 150 MG: at 08:38

## 2022-11-03 RX ADMIN — PANTOPRAZOLE SODIUM 40 MG: 40 TABLET, DELAYED RELEASE ORAL at 05:37

## 2022-11-03 RX ADMIN — Medication 1 TABLET: at 08:38

## 2022-11-03 RX ADMIN — LEVOTHYROXINE SODIUM 25 MCG: 0.07 TABLET ORAL at 05:37

## 2022-11-03 RX ADMIN — PREGABALIN 100 MG: 100 CAPSULE ORAL at 08:37

## 2022-11-03 RX ADMIN — HYDROCODONE BITARTRATE AND ACETAMINOPHEN 1 TABLET: 5; 325 TABLET ORAL at 07:51

## 2022-11-03 RX ADMIN — DAPTOMYCIN 500 MG: 500 INJECTION, POWDER, LYOPHILIZED, FOR SOLUTION INTRAVENOUS at 18:06

## 2022-11-03 RX ADMIN — CARBIDOPA AND LEVODOPA 10 MG: 50; 200 TABLET, EXTENDED RELEASE ORAL at 11:35

## 2022-11-03 RX ADMIN — INSULIN DETEMIR 20 UNITS: 100 INJECTION, SOLUTION SUBCUTANEOUS at 08:17

## 2022-11-03 RX ADMIN — DOCUSATE SODIUM 100 MG: 100 CAPSULE ORAL at 08:38

## 2022-11-03 RX ADMIN — INSULIN ASPART 4 UNITS: 100 INJECTION, SOLUTION INTRAVENOUS; SUBCUTANEOUS at 17:04

## 2022-11-03 RX ADMIN — FONDAPARINUX SODIUM 2.5 MG: 2.5 INJECTION, SOLUTION SUBCUTANEOUS at 08:37

## 2022-11-03 RX ADMIN — HYDROCODONE BITARTRATE AND ACETAMINOPHEN 1 TABLET: 5; 325 TABLET ORAL at 17:08

## 2022-11-03 RX ADMIN — PREGABALIN 100 MG: 100 CAPSULE ORAL at 21:41

## 2022-11-03 RX ADMIN — ACETAMINOPHEN 650 MG: 325 TABLET, FILM COATED ORAL at 13:55

## 2022-11-03 RX ADMIN — SODIUM CHLORIDE 500 ML: 9 INJECTION, SOLUTION INTRAVENOUS at 11:35

## 2022-11-03 RX ADMIN — INSULIN DETEMIR 20 UNITS: 100 INJECTION, SOLUTION SUBCUTANEOUS at 21:41

## 2022-11-03 RX ADMIN — DOCUSATE SODIUM 100 MG: 100 CAPSULE ORAL at 21:41

## 2022-11-03 RX ADMIN — OXYCODONE HYDROCHLORIDE AND ACETAMINOPHEN 500 MG: 500 TABLET ORAL at 08:38

## 2022-11-03 RX ADMIN — ASPIRIN 81 MG: 81 TABLET, COATED ORAL at 08:38

## 2022-11-03 RX ADMIN — CARBIDOPA AND LEVODOPA 10 MG: 50; 200 TABLET, EXTENDED RELEASE ORAL at 17:04

## 2022-11-03 RX ADMIN — CARBIDOPA AND LEVODOPA 10 MG: 50; 200 TABLET, EXTENDED RELEASE ORAL at 07:51

## 2022-11-03 NOTE — PROGRESS NOTES
[facility]  PROGRESS NOTE     Patient Identification:  Name:  Melchor Hardwick III  Age:  57 y.o.  Sex:  male  :  1965  MRN:  [id number]  Visit Number:  [id number]  ROOM: 110/2S     Primary Care Provider:  Missy Up APRN    Length of stay in inpatient status:  6    Subjective     Chief Compliant:  No chief complaint on file.      History of Presenting Illness:   57-year-old gentleman being followed for debility.  Patient is recent treated for septic arthritis involving the right knee with MRSA bacteremia and he is completing course of daptomycin at this time.  Patient is still having some dizziness with standing.    Objective     Current Hospital Meds:ascorbic acid, 500 mg, Oral, Daily  aspirin, 81 mg, Oral, Daily  DAPTOmycin, 6 mg/kg (Adjusted), Intravenous, Q24H  docusate sodium, 100 mg, Oral, BID  fondaparinux, 2.5 mg, Subcutaneous, Q24H  Insulin Aspart, 0-9 Units, Subcutaneous, TID AC  insulin detemir, 20 Units, Subcutaneous, Q12H  iron polysaccharides, 150 mg, Oral, Daily  levothyroxine, 25 mcg, Oral, Q AM  midodrine, 10 mg, Oral, TID AC  multivitamin with minerals, 1 tablet, Oral, Daily  pantoprazole, 40 mg, Oral, Q AM  pregabalin, 100 mg, Oral, Q12H  sodium chloride, 500 mL, Intravenous, Once    Pharmacy Consult,       ----------------------------------------------------------------------------------------------------------------------  Vital Signs:  Temp:  [97.6 °F (36.4 °C)-98 °F (36.7 °C)] 97.6 °F (36.4 °C)  Heart Rate:  [79-85] 85  Resp:  [18] 18  BP: (124-126)/(59-72) 124/72  SpO2:  [98 %-100 %] 100 %  on  Flow (L/min):  [2] 2;   Device (Oxygen Therapy): room air  Body mass index is 28.34 kg/m².    Wt Readings from Last 3 Encounters:   22 89.6 kg (197 lb 8.5 oz)   22 98 kg (216 lb)   22 115 kg (254 lb)     Intake & Output (last 3 days)       10/31 0701   0700  0701   07 0701   07 0701   07    P.O. 1560 1320 1920 360     I.V. (mL/kg)  250 (2.8)      IV Piggyback  25      Total Intake(mL/kg) 1560 (17.4) 1595 (17.8) 1920 (21.4) 360 (4)    Urine (mL/kg/hr) 1000 (0.5)  3000 (1.4) 375 (0.9)    Stool 0       Total Output 1000  3000 375    Net +560 +1595 -1080 -15            Urine Unmeasured Occurrence 12 x 9 x 7 x     Stool Unmeasured Occurrence 1 x 1 x          Diet Regular; Thin; Consistent Carbohydrate  ----------------------------------------------------------------------------------------------------------------------  Physical exam:  Constitutional:   No acute distress  HEENT: Normocephalic atraumatic  Neck: Supple   Cardiovascular: Regular rate and rhythm  Pulmonary/Chest: Clear to auscultation  Abdominal: Positive bowel sounds soft.   Musculoskeletal: Status post right knee surgery for drainage/washout of right knee joint  Neurological: No focal deficits  Skin: No rash  Peripheral vascular:  Genitourinary:  ----------------------------------------------------------------------------------------------------------------------    Last echocardiogram:  Results for orders placed during the hospital encounter of 10/28/22    Adult Transesophageal Echo (TYRESE) W/ Cont if Necessary Per Protocol    Interpretation Summary  •  Indication for TYRESE -to rule out infective endocarditis in a patient with MRSA bacteremia.  •  Findings-  •  No evidence of vegetation is seen on any valve.  •  Left ventricular systolic function is normal. Estimated left ventricular EF = 60%  •  Left atrial appendage is well visualized and is free of thrombus.  •  Saline test was negative for the evidence of shunt in interatrial septum.  •  All valves appear normal in structure and showed no stenosis or significant regurgitation.  •  There is no evidence of pericardial effusion.    ----------------------------------------------------------------------------------------------------------------------  Results from last 7 days   Lab Units 10/31/22  0148 10/30/22  0109  10/29/22  0112 10/28/22  0210   CRP mg/dL  --   --  3.73* 4.00*   WBC 10*3/mm3 3.12* 3.85 2.98* 3.62   HEMOGLOBIN g/dL 8.8* 8.2* 8.0* 8.2*   HEMATOCRIT % 28.8* 26.9* 26.1* 26.4*   MCV fL 84.7 85.1 84.5 85.2   MCHC g/dL 30.6* 30.5* 30.7* 31.1*   PLATELETS 10*3/mm3 102* 96* 90* 106*         Results from last 7 days   Lab Units 10/31/22  0148 10/30/22  0109 10/29/22  0112 10/28/22  0210   SODIUM mmol/L 138 136 133* 136   POTASSIUM mmol/L 4.1 3.9 3.8 3.9   MAGNESIUM mg/dL  --  2.1 1.8  --    CHLORIDE mmol/L 103 102 101 102   CO2 mmol/L 26.6 26.1 25.3 24.9   BUN mg/dL 9 11 10 11   CREATININE mg/dL 0.61* 0.55* 0.63* 0.60*   CALCIUM mg/dL 8.0* 8.1* 8.2* 8.1*   GLUCOSE mg/dL 154* 211* 152* 179*   ALBUMIN g/dL  --   --  2.13* 2.29*   BILIRUBIN mg/dL  --   --  0.4 0.4   ALK PHOS U/L  --   --  289* 311*   AST (SGOT) U/L  --   --  30 33   ALT (SGPT) U/L  --   --  26 29   Estimated Creatinine Clearance: 150.4 mL/min (A) (by C-G formula based on SCr of 0.61 mg/dL (L)).  No results found for: AMMONIA  Results from last 7 days   Lab Units 10/29/22  0112   CK TOTAL U/L 34             Glucose   Date/Time Value Ref Range Status   11/03/2022 1111 202 (H) 70 - 130 mg/dL Final     Comment:     Meter: [id number] : [id number] Penelope Dawson   11/03/2022 0600 87 70 - 130 mg/dL Final     Comment:     Meter: [id number] : [id number] Susi Velez   11/02/2022 1945 237 (H) 70 - 130 mg/dL Final     Comment:     Meter: [id number] : [id number] [place] Crystal   11/02/2022 1552 362 (H) 70 - 130 mg/dL Final     Comment:     Meter: [id number] : [id number] mark massengill   11/02/2022 1103 196 (H) 70 - 130 mg/dL Final     Comment:     Meter: [id number] : [id number] mark massengill   11/02/2022 0659 139 (H) 70 - 130 mg/dL Final     Comment:     Meter: [id number] : [id number] Tracey Crystal   11/01/2022 2020 299 (H) 70 - 130 mg/dL Final     Comment:     Meter: [id number] : [id number] [place] Crystal   11/01/2022 1557 207 (H)  70 - 130 mg/dL Final     Comment:     Meter: [id number] : [id number] lisa ramirez     Lab Results   Component Value Date    TSH 2.390 10/10/2022    FREET4 0.87 (L) 10/10/2022     No results found for: PREGTESTUR, PREGSERUM, HCG, HCGQUANT  Pain Management Panel     Pain Management Panel Latest Ref Rng & Units 5/24/2022 5/11/2022    CREATININE UR mg/dL 91.0 -    AMPHETAMINES SCREEN, URINE Negative - Negative    BARBITURATES SCREEN Negative - Negative    BENZODIAZEPINE SCREEN, URINE Negative - Negative    BUPRENORPHINEUR Negative - Negative    COCAINE SCREEN, URINE Negative - Negative    METHADONE SCREEN, URINE Negative - Negative    METHAMPHETAMINEUR Negative - Negative        Brief Urine Lab Results  (Last result in the past 365 days)      Color   Clarity   Blood   Leuk Est   Nitrite   Protein   CREAT   Urine HCG        10/19/22 0746 Yellow   Clear   Negative   Negative   Negative   Negative               No results found for: BLOODCX      No results found for: URINECX  No results found for: WOUNDCX  No results found for: STOOLCX  Results from last 7 days   Lab Units 10/29/22  0112 10/28/22  0210   CRP mg/dL 3.73* 4.00*       I have personally looked at the labs and they are summarized above.  ----------------------------------------------------------------------------------------------------------------------  Detailed radiology reports for the last 24 hours:    Imaging Results (Last 24 Hours)     ** No results found for the last 24 hours. **        Final impressions for the last 30 days of radiology reports:    CT knee right wo contrast    Result Date: 10/13/2022  1. Mixed density effusion which does contain air. 2. No evidence of osteomyelitis at the time of the study.  This report was finalized on 10/13/2022 3:08 PM by Dr. Lobo Ring MD.      XR Chest 1 View    Result Date: 10/18/2022  Probable airspace disease in the right lung with low lung volumes. Right PICC line at the level of diaphragm.  Jinny  Name: Yaquelin Ferreira MD  Signed: 10/18/2022 9:00 PM  Workstation Name: [facility]  Radiology [facility]    XR Chest 1 View    Result Date: 10/6/2022    Unremarkable exam. No acute cardiopulmonary findings identified.  This report was finalized on 10/6/2022 8:24 AM by Dr. Oswaldo Brown MD.      CT Angiogram Chest Pulmonary Embolism    Result Date: 10/13/2022  Elevated right diaphragm with volume loss and atelectasis at the right lung base. Linear scarring at the right lung apex. No definite acute infiltrates. No evidence of pulmonary embolism. Signer Name: Abner Billy MD  Signed: 10/13/2022 11:52 PM  Workstation Name: [facility]  Radiology [facility]    US Venous Doppler Lower Extremity Bilateral (duplex)    Result Date: 10/13/2022  No deep venous thrombus seen in either lower extremity. Signer Name: Abner Billy MD  Signed: 10/13/2022 11:53 PM  Workstation Name: [facility]  Radiology [facility]    I have personally looked at the radiology images and read the final radiology report.    Assessment & Plan    Debility--patient independent for bed mobility; contact-guard for transfers; ambulated 3 feet in the parallel bars.    Orthostatic hypotension--patient getting midodrine 10 mg 3 times daily.  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        VTE Prophylaxis:   Mechanical Order History:     None      Pharmalogical Order History:      Ordered     Dose Route Frequency Stop    10/28/22 5792  fondaparinux (ARIXTRA) injection 2.5 mg         2.5 mg SC Every 24 Hours Scheduled --                    Joe Mike MD  [facility]  11/03/22  11:37 EDT

## 2022-11-03 NOTE — PROGRESS NOTES
Occupational Therapy: Individual: 90 minutes.    Physical Therapy:    Speech Language Pathology:    Signed by: Dorothy Rosas OT

## 2022-11-03 NOTE — PROGRESS NOTES
Occupational Therapy:    Physical Therapy: Individual: 135 minutes.    Speech Language Pathology:    Signed by: Michi Mckeon PTA

## 2022-11-03 NOTE — PROGRESS NOTES
[facility] Coordinator  Inpatient Rehabilitation Admission    Ethnic Group:  Marital Status:    IRF Admission Date:  10/28/2022  Admission Class:  Admit From:    Pre-Hospital Living:    Payment Sources:  Impairment Group:  Date of Onset of Impairment:    Etiologic Diagnosis Code(s):  Rank Code      Description  1    R78.81    Bacteremia    Comorbidities:  Rank Code      Description    1    M00.9     Pyogenic arthritis, unspecified  2    D69.6     Thrombocytopenia, unspecified  3    E11.42    Type 2 diabetes mellitus with diabetic                 polyneuropathy  4    B95.62    Methicillin resistant Staphylococcus aureus                 infection as the cause of diseases classified                 elsewhere  5    D64.9     Anemia, unspecified  6    E03.9     Hypothyroidism, unspecified  7    I10       Essential (primary) hypertension  8    K74.60    Unspecified cirrhosis of liver  9    K75.81    Nonalcoholic steatohepatitis (PAZ)  10   Z79.4     Long term (current) use of insulin  11   Z89.432   Acquired absence of left foot    Height on Admission:  Weight on Admission:    Are there any arthritis conditions recorded for Impairment Group, Etiologic  Diagnosis, or Comorbid Conditions that meet all of the regulatory requirements  for IRF classification (in 42 .29(b)(2)(x), (xi), and xii))?  No    SHANNON Bladder Accidents:   - Accidents.    SHANNON Bowel Accident:  -Accidents.    Signed by: Nurse Junior

## 2022-11-03 NOTE — SIGNIFICANT NOTE
11/03/22 0950   Plan   Plan Spoke to sister Ros [phone] about how pt is doing in therapy and recommendation for discharge on 11-18-22.  Sister is agreeable to pt returning to her home at discharge as long as she is able to assist him with caregiving needs.  Discussed option of sister coming to rehab for education with therapy prior to discharge if needed.  Will follow.   Patient/Family in Agreement with Plan yes

## 2022-11-03 NOTE — THERAPY TREATMENT NOTE
Inpatient Rehabilitation - Occupational Therapy Treatment Note    YVONNE Gaines     Patient Name: Melchor Hardwick III  : 1965  MRN: [id number]    Today's Date: 11/3/2022                 Admit Date: 10/28/2022         ICD-10-CM ICD-9-CM   1. Staphylococcal arthritis of right knee (HCC)  M00.061 711.06     041.10       Patient Active Problem List   Diagnosis   • Hyperlipidemia   • Hypertensive disorder   • Obesity   • Type 2 diabetes mellitus with hyperglycemia, with long-term current use of insulin (HCC)   • Gas gangrene of foot (HCC)   • Cellulitis in diabetic foot (HCC)   • Other acute osteomyelitis of left foot (HCC)   • Osteomyelitis (HCC)   • Critical illness myopathy   • Staphylococcal arthritis of right knee (HCC)   • Other cirrhosis of liver (HCC)   • MRSA bacteremia   • Endocarditis of tricuspid valve   • Wound of right buttock   • History of osteomyelitis   • Debility       Past Medical History:   Diagnosis Date   • Diabetes mellitus (HCC)    • Hyperlipidemia    • Hypertension    • Pyogenic arthritis of right knee joint, due to unspecified organism (HCC) 2022       Past Surgical History:   Procedure Laterality Date   • ABSCESS DRAINAGE      PERINEUM   • AMPUTATION FOOT Left 2022    Procedure: AMPUTATION FOOT;  Surgeon: Addison Colby MD;  Location: [facility] OR;  Service: Podiatry;  Laterality: Left;   • INCISION AND DRAINAGE LEG Left 05/10/2022    Procedure: INCISION AND DRAINAGE LOWER EXTREMITY;  Surgeon: Addison Colyb MD;  Location:  [facility] OR;  Service: Podiatry;  Laterality: Left;   • KNEE INCISION AND DRAINAGE Right 2022    Procedure: KNEE INCISION AND DRAINAGE RIGHT;  Surgeon: Brain Bentley MD;  Location: [facility] OR;  Service: Orthopedics;  Laterality: Right;   • WOUND DEBRIDEMENT Left 2022    Procedure: DEBRIDEMENT FOOT;  Surgeon: Addison Colby MD;  Location: [facility] OR;  Service: Podiatry;  Laterality: Left;             IRF OT ASSESSMENT FLOWSHEET (last 12 hours)      IRF OT Evaluation and Treatment     Row Name 11/03/22 1422          OT Time and Intention    Document Type daily treatment  -HB     Mode of Treatment individual therapy;occupational therapy  -HB     Total Minutes, Occupational Therapy 90  -HB     Patient Effort good  -HB     Symptoms Noted During/After Treatment none  -HB     Row Name 11/03/22 1422          General Information    Patient Profile Reviewed yes  -HB     Patient/Family/Caregiver Comments/Observations Patient tolerated OT well with no adverse reactions.  -HB     General Observations of Patient Patient and RN nate OT this date.  -HB     Existing Precautions/Restrictions fall;brace worn when out of bed  LLE  -HB     Limitations/Impairments other (see comments)  -HB     Row Name 11/03/22 1422          Pain Assessment    Pretreatment Pain Rating 4/10  -HB     Posttreatment Pain Rating 4/10  -HB     Pain Location - back  -HB     Row Name 11/03/22 1422          Cognition/Psychosocial    Affect/Mental Status (Cognition) WFL  -     Orientation Status (Cognition) oriented x 4  -HB     Follows Commands (Cognition) WFL  -     Row Name 11/03/22 1422          Lower Body Dressing    [place] Level (Lower Body Dressing) moderate assist (50% patient effort);verbal cues;nonverbal cues (demo/gesture)  -HB     Position (Lower Body Dressing) edge of bed sitting;supine  -HB     Row Name 11/03/22 1422          Grooming    [place] Level (Grooming) grooming skills;set up  -HB     Position (Grooming) supported sitting  -[facility] Name 11/03/22 1422          Toileting    [place] Level (Toileting) toileting skills;minimum assist (75% patient effort)  -HB     Assistive Device Use (Toileting) urinal  -HB     Position (Toileting) supported sitting  -HB     Row Name 11/03/22 1422          Bed Mobility    Supine-Sit [place] (Bed Mobility) modified independence  -     Row Name 11/03/22 1422          Bed-Chair Transfer    Bed-Chair [place] (Transfers)  minimum assist (75% patient effort);nonverbal cues (demo/gesture);verbal cues  -     Assistive Device (Bed-Chair Transfers) walker, front-wheeled;wheelchair  -     Row Name 11/03/22 1422          Sit-Stand Transfer    Sit-Stand [place] (Transfers) nonverbal cues (demo/gesture);verbal cues;minimum assist (75% patient effort)  -     Row Name 11/03/22 1422          Stand-Sit Transfer    Stand-Sit [place] (Transfers) minimum assist (75% patient effort);verbal cues;nonverbal cues (demo/gesture)  -     Row Name 11/03/22 1422          Motor Skills    Motor Skills coordination;functional endurance;motor control/coordination interventions  -     Motor Control/Coordination Interventions fine motor manipulation/dexterity activities;therapeutic exercise/ROM;gross motor coordination activities  -     Therapeutic Exercise shoulder;elbow/forearm;hand;wrist  -     Additional Documentation --  PRE 3x 3min; BUE TA to increase ADL status; rest between tasks.  -     Row Name 11/03/22 1422          Positioning and Restraints    Pre-Treatment Position in bed  -     Post Treatment Position bed  -     In Bed encouraged to call for assist;call light within reach  second session pt found in wheelchair and left with PT at end of session  -           User Key  (r) = Recorded By, (t) = Taken By, (c) = Cosigned By    Initials Name Effective Dates     Dorothy Rosas, OT 05/25/21 -                  Occupational Therapy Education     Title: PT OT SLP Therapies (Done)     Topic: Occupational Therapy (Done)     Point: ADL training (Done)     Description:   Instruct learner(s) on proper safety adaptation and remediation techniques during self care or transfers.   Instruct in proper use of assistive devices.              Learning Progress Summary           Patient Acceptance, E, VU,NR by CLARITZA at 11/3/2022 1428    Acceptance, E,TB, VU by BRYSON at 11/1/2022 2016    Acceptance, E,TB, VU by BRYSON at 10/31/2022 2315    Acceptance,  E, VU,NR by BF at 10/31/2022 1429    Acceptance, E,TB, VU by DL at 10/31/2022 0101    Acceptance, E,TB, VU by DL at 10/29/2022 2321    Acceptance, E, VU,NR by HB at 10/29/2022 1137                   Point: Home exercise program (Done)     Description:   Instruct learner(s) on appropriate technique for monitoring, assisting and/or progressing therapeutic exercises/activities.              Learning Progress Summary           Patient Acceptance, E, VU,NR by HB at 11/3/2022 1428    Acceptance, E,TB, VU by DG at 11/1/2022 2016    Acceptance, E,TB, VU by DG at 10/31/2022 2315    Acceptance, E,TB, VU by DL at 10/31/2022 0101    Acceptance, E,TB, VU by DL at 10/29/2022 2321    Acceptance, E, VU,NR by HB at 10/29/2022 1137                   Point: Precautions (Done)     Description:   Instruct learner(s) on prescribed precautions during self-care and functional transfers.              Learning Progress Summary           Patient Acceptance, E, VU,NR by HB at 11/3/2022 1428    Acceptance, E,TB, VU by DG at 11/1/2022 2016    Acceptance, E,TB, VU by DG at 10/31/2022 2315    Acceptance, E, VU,NR by BF at 10/31/2022 1429    Acceptance, E,TB, VU by DL at 10/31/2022 0101    Acceptance, E,TB, VU by DL at 10/29/2022 2321    Acceptance, E, VU,NR by HB at 10/29/2022 1137                   Point: Body mechanics (Done)     Description:   Instruct learner(s) on proper positioning and spine alignment during self-care, functional mobility activities and/or exercises.              Learning Progress Summary           Patient Acceptance, E, VU,NR by HB at 11/3/2022 1428    Acceptance, E,TB, VU by DG at 11/1/2022 2016    Acceptance, E,TB, VU by DG at 10/31/2022 2315    Acceptance, E,TB, VU by DL at 10/31/2022 0101    Acceptance, E,TB, VU by DL at 10/29/2022 2321    Acceptance, E, VU,NR by HB at 10/29/2022 2086                               User Key     Initials Effective Dates Name Provider Type Discipline     06/16/21 -  Phyllis Yañez, RN  Registered Nurse Nurse    BF 06/16/21 -  Mandi Smith OT Occupational Therapist OT    HB 05/25/21 -  Dorothy Rosas OT Occupational Therapist OT    DL 03/16/22 -  Hellen Mcdonnell, RN Registered Nurse Nurse                    OT Recommendation and Plan    Planned Therapy Interventions (OT): activity tolerance training, adaptive equipment training, BADL retraining, IADL retraining, patient/caregiver education/training, strengthening exercise, transfer/mobility retraining                    Time Calculation:      Time Calculation- OT     Row Name 11/03/22 1428 11/03/22 1218          Time Calculation- OT    OT Start Time 1245  -HB 0745  -HB     OT Stop Time 1330  -HB 0830  -HB     OT Time Calculation (min) 45 min  -HB 45 min  -HB     Total Timed Code Minutes- OT 45 minute(s)  -HB 45 minute(s)  -HB           User Key  (r) = Recorded By, (t) = Taken By, (c) = Cosigned By    Initials Name Provider Type     Dorothy Rosas OT Occupational Therapist              Therapy Charges for Today     Code Description Service Date Service Provider Modifiers Qty    95094569418 HC OT SELF CARE/MGMT/TRAIN EA 15 MIN 11/3/2022 Dorothy Rosas OT GO 2    88467014893 HC OT THER PROC EA 15 MIN 11/3/2022 Dorothy Rosas OT GO 1    68391291193 HC OT THERAPEUTIC ACT EA 15 MIN 11/3/2022 Dorothy Rosas OT GO 3                   Dorothy Rosas OT  11/3/2022

## 2022-11-03 NOTE — PAYOR COMM NOTE
"Jaja Ramirez, RN   Utilization Review Nurse for Inpatient Rehab   Phone: [phone]  Fax: [phone]  Email: [email or web address]  [facility] NPI: [id number]    CLINICAL REVIEW FOR CONTINUED REHAB STAY APPROVAL  Member:  Melchor Hardwick  :  1965  Ref# [id number]  ID# [id number]  Admission Date:  10/28/22  Planning for Discharge home on 22  *Requesting additional 7 days    Melchor Hardwick III (57 y.o. Male)     Date of Birth   1965    Social Security Number       Address   [address] [address]    Home Phone   [phone]    MRN   [id number]       [employer]ity   [facility]    Marital Status   Single                            Admission Date   10/28/22    Admission Type   Elective    Admitting Provider   Ja Bowers MD    Attending Provider   Ja Bowers MD    Department, Room/Bed   [facility] REHABILITATION, 110/2S       Discharge Date       Discharge Disposition       Discharge Destination                               Attending Provider: Ja Bowers MD    Allergies: No Known Allergies    Isolation: None   Infection: MRSA/History Only (10/17/22)   Code Status: Prior    Ht: 177.8 cm (70\")   Wt: 89.6 kg (197 lb 8.5 oz)    Admission Cmt: None   Principal Problem: Debility [R53.81]               Corrina Mendez MSW     Case Management  Significant Note     Signed  Date of Service:  22  Creation Time:  22     Signed                         22 1520   Plan   Plan Team conference held today.  SS spoke to pt about plans for discharge on 22 and how he is doing in therapy.  Pt plans to return home with sister Ros at discharge who will assist with caregiving needs.  Will follow.   Patient/Family in Agreement with Plan yes                 Joe Mike MD   Physician  Medicine  Progress Notes     Signed  Date of Service:  22 110  Creation Time:  22     Signed        Expand " All Collapse All     [facility]  PROGRESS NOTE     Patient Identification:  Name:  Melchor Hardwick III  Age:  57 y.o.  Sex:  male  :  1965  MRN:  [id number]  Visit Number:  [id number]  ROOM: 110/2S      Primary Care Provider:  Missy Up APRN     Length of stay in inpatient status:  5     Subjective      Chief Compliant:  No chief complaint on file.        History of Presenting Illness: 57-year-old gentleman being followed for debility.  Was recently treated for septic arthritis involving the right knee with MRSA bacteremia.  Patient states he is doing better this morning and is less dizzy and he is actively doing therapy at this time.     Objective      Current Hospital Meds:ascorbic acid, 500 mg, Oral, Daily  aspirin, 81 mg, Oral, Daily  DAPTOmycin, 6 mg/kg (Adjusted), Intravenous, Q24H  docusate sodium, 100 mg, Oral, BID  fondaparinux, 2.5 mg, Subcutaneous, Q24H  Insulin Aspart, 0-9 Units, Subcutaneous, TID AC  insulin detemir, 20 Units, Subcutaneous, Q12H  iron polysaccharides, 150 mg, Oral, Daily  levothyroxine, 25 mcg, Oral, Q AM  midodrine, 10 mg, Oral, TID AC  multivitamin with minerals, 1 tablet, Oral, Daily  pantoprazole, 40 mg, Oral, Q AM  pregabalin, 100 mg, Oral, Q12H     Pharmacy Consult,         ----------------------------------------------------------------------------------------------------------------------  Vital Signs:  Temp:  [97.5 °F (36.4 °C)-98.3 °F (36.8 °C)] 97.9 °F (36.6 °C)  Heart Rate:  [] 66  Resp:  [16-18] 16  BP: ()/(53-71) 116/69  SpO2:  [96 %-100 %] 99 %  on  Flow (L/min):  [2] 2;   Device (Oxygen Therapy): nasal cannula  Body mass index is 28.34 kg/m².         Wt Readings from Last 3 Encounters:   22 89.6 kg (197 lb 8.5 oz)   22 98 kg (216 lb)   22 115 kg (254 lb)               Intake & Output (last 3 days)        10/30 0701  10/31 0700 10/31 0701   07 0701   07 0701   0700     P.O. 1800 1560  1320 240     I.V. (mL/kg)     250 (2.8)       IV Piggyback     25       Total Intake(mL/kg) 1800 (20.1) 1560 (17.4) 1595 (17.8) 240 (2.7)     Urine (mL/kg/hr) 3430 (1.6) 1000 (0.5)         Stool 0 0         Total Output 3430 1000         Net -1630 +560 +1595 +240                   Urine Unmeasured Occurrence 1 x 12 x 9 x 1 x     Stool Unmeasured Occurrence 1 x 1 x 1 x            Diet Regular; Thin; Consistent Carbohydrate  ----------------------------------------------------------------------------------------------------------------------  Physical exam:  Constitutional:   No acute distress  HEENT: Normocephalic atraumatic  Neck: Supple   Cardiovascular: Regular rate and rhythm  Pulmonary/Chest: Clear to auscultation  Abdominal: Positive bowel sounds soft.   Musculoskeletal: No obvious arthropathy; right knee no erythema noted.  No effusions noted; left foot has transmetatarsal amputation recently  Neurological: No focal deficits  Skin: No rash  Peripheral vascular:  Genitourinary:  ----------------------------------------------------------------------------------------------------------------------     Last echocardiogram:  Results for orders placed during the hospital encounter of 10/05/22     Adult Transthoracic Echo Complete W/ Cont if Necessary Per Protocol     Interpretation Summary  •  Normal left ventricular cavity size and wall thickness noted. All left ventricular wall segments contract normally.  •  Left ventricular ejection fraction appears to be 61 - 65%.  •  Left ventricular diastolic function is consistent with (grade I) impaired relaxation.  •  The aortic valve exhibits sclerosis. The aortic valve appears trileaflet.  •  No aortic valve regurgitation or stenosis is present. The aortic valve is abnormal in structure.  •  The mitral valve is structurally normal with no significant stenosis present. Trace to mild mitral valve regurgitation is present.  •  There is no evidence of pericardial effusion.      ----------------------------------------------------------------------------------------------------------------------           Results from last 7 days   Lab Units 10/31/22  0148 10/30/22  0109 10/29/22  0112 10/28/22  0210 10/27/22  0122   CRP mg/dL  --   --  3.73* 4.00* 5.22*   WBC 10*3/mm3 3.12* 3.85 2.98* 3.62 3.92   HEMOGLOBIN g/dL 8.8* 8.2* 8.0* 8.2* 8.5*   HEMATOCRIT % 28.8* 26.9* 26.1* 26.4* 27.1*   MCV fL 84.7 85.1 84.5 85.2 83.4   MCHC g/dL 30.6* 30.5* 30.7* 31.1* 31.4*   PLATELETS 10*3/mm3 102* 96* 90* 106* 110*                   Results from last 7 days   Lab Units 10/31/22  0148 10/30/22  0109 10/29/22  0112 10/28/22  0210 10/27/22  0122   SODIUM mmol/L 138 136 133* 136 134*   POTASSIUM mmol/L 4.1 3.9 3.8 3.9 3.9   MAGNESIUM mg/dL  --  2.1 1.8  --   --    CHLORIDE mmol/L 103 102 101 102 102   CO2 mmol/L 26.6 26.1 25.3 24.9 24.6   BUN mg/dL 9 11 10 11 10   CREATININE mg/dL 0.61* 0.55* 0.63* 0.60* 0.60*   CALCIUM mg/dL 8.0* 8.1* 8.2* 8.1* 8.1*   GLUCOSE mg/dL 154* 211* 152* 179* 138*   ALBUMIN g/dL  --   --  2.13* 2.29* 2.32*   BILIRUBIN mg/dL  --   --  0.4 0.4 0.4   ALK PHOS U/L  --   --  289* 311* 335*   AST (SGOT) U/L  --   --  30 33 33   ALT (SGPT) U/L  --   --  26 29 32   Estimated Creatinine Clearance: 150.4 mL/min (A) (by C-G formula based on SCr of 0.61 mg/dL (L)).  No results found for: AMMONIA       Results from last 7 days   Lab Units 10/29/22  0112   CK TOTAL U/L 34                      Glucose   Date/Time Value Ref Range Status   11/02/2022 0659 139 (H) 70 - 130 mg/dL Final       Comment:       Meter: [id number] : [id number] Tracey Crystal   11/01/2022 2020 299 (H) 70 - 130 mg/dL Final       Comment:       Meter: [id number] : [id number] Dudley Crystal   11/01/2022 1557 207 (H) 70 - 130 mg/dL Final       Comment:       Meter: [id number] : [id number] lisa ramirez   11/01/2022 1105 117 70 - 130 mg/dL Final       Comment:       Meter: [id number] : [id number] lisa ramirez    11/01/2022 1008 102 70 - 130 mg/dL Final       Comment:       Meter: [id number] : [id number] Steve Yancey   11/01/2022 0832 112 70 - 130 mg/dL Final       Comment:       Meter: [id number] : [id number] ADRIENNE MADISON   11/01/2022 0741 156 (H) 70 - 130 mg/dL Final       Comment:       Meter: [id number] : [id number] Steve Yancey   11/01/2022 0723 84 70 - 130 mg/dL Final       Comment:       Meter: [id number] : [id number] Steve Yancey            Lab Results   Component Value Date     TSH 2.390 10/10/2022     FREET4 0.87 (L) 10/10/2022      No results found for: PREGTESTUR, PREGSERUM, HCG, HCGQUANT          Pain Management Panel         Pain Management Panel Latest Ref Rng & Units 5/24/2022 5/11/2022     CREATININE UR mg/dL 91.0 -     AMPHETAMINES SCREEN, URINE Negative - Negative     BARBITURATES SCREEN Negative - Negative     BENZODIAZEPINE SCREEN, URINE Negative - Negative     BUPRENORPHINEUR Negative - Negative     COCAINE SCREEN, URINE Negative - Negative     METHADONE SCREEN, URINE Negative - Negative     METHAMPHETAMINEUR Negative - Negative                                   Brief Urine Lab Results  (Last result in the past 365 days)       Color   Clarity   Blood   Leuk Est   Nitrite   Protein   CREAT   Urine HCG         10/19/22 0746 Yellow    Clear    Negative    Negative    Negative    Negative                                Results from last 7 days   Lab Units 10/29/22  0112 10/28/22  0210 10/27/22  0122   CRP mg/dL 3.73* 4.00* 5.22*         I have personally looked at the labs and they are summarized above.  ----------------------------------------------------------------------------------------------------------------------  Detailed radiology reports for the last 24 hours:         Imaging Results (Last 24 Hours)      ** No results found for the last 24 hours. **          Final impressions for the last 30 days of radiology reports:     CT knee right wo contrast     Result Date:  10/13/2022  1. Mixed density effusion which does contain air. 2. No evidence of osteomyelitis at the time of the study.  This report was finalized on 10/13/2022 3:08 PM by Dr. Lobo Ring MD.       XR Chest 1 View     Result Date: 10/18/2022  Probable airspace disease in the right lung with low lung volumes. Right PICC line at the level of diaphragm.  Signer Name: Yaquelin Ferreira MD  Signed: 10/18/2022 9:00 PM  Workstation Name: [facility]  Radiology [facility]     XR Chest 1 View     Result Date: 10/6/2022    Unremarkable exam. No acute cardiopulmonary findings identified.  This report was finalized on 10/6/2022 8:24 AM by Dr. Oswaldo Brown MD.       CT Angiogram Chest Pulmonary Embolism     Result Date: 10/13/2022  Elevated right diaphragm with volume loss and atelectasis at the right lung base. Linear scarring at the right lung apex. No definite acute infiltrates. No evidence of pulmonary embolism. Signer Name: Abner Billy MD  Signed: 10/13/2022 11:52 PM  Workstation [facility]  Radiology [facility]     US Venous Doppler Lower Extremity Bilateral (duplex)     Result Date: 10/13/2022  No deep venous thrombus seen in either lower extremity. Signer Name: Abner Billy MD  Signed: 10/13/2022 11:53 PM  Workstation Name: [facility]  [facility]     I have personally looked at the radiology images and read the final radiology report.     Assessment & Plan    Debility--patient independent for bed mobility; did lower extremity strengthening exercises yesterday; requiring moderate assistance for lower body dressing; minimum assist for bed mobility and contact-guard for transfers.  Patient less symptomatic from orthostatic hypotension today and hopefully can be much more aggressive in her therapy today.     Orthostatic hypotension currently on midodrine 10 mg 3 times daily.  Appears to be improving     Septic arthritis with recent MRSA bacteremia continue  daptomycin.  TYRESE did not show any valve vegetation findings.     Thrombocytopenia stable     History of cirrhosis secondary to [place]     Diabetes mellitus controlled     Hypothyroidism continue Synthroid     VTE Prophylaxis:       Mechanical Order History:      None               Pharmalogical Order History:                   Ordered     Dose Route Frequency Stop      10/28/22 1742   fondaparinux (ARIXTRA) injection 2.5 mg         2.5 mg SC Every 24 Hours Scheduled --                Joe Mike MD  [facility]  22  11:04 EDT                Marilynn Giordano MD   Physician  Infectious Disease  Progress Notes     Addendum  Date of Service:  22  Creation Time:  22     Expand All Collapse All              PROGRESS NOTE           Patient Identification:  Name:  Melchor Hardwick III  Age:  57 y.o.  Sex:  male  :  1965  MRN:  [id number]  Visit Number:  [id number]  Primary Care Provider:  Missy Up APRN            LOS: 5 days         ----------------------------------------------------------------------------------------------------------------------  Subjective         Chief Complaints:     No chief complaint on file.           Interval History:       Patient sitting up in chair working with occupational therapy this morning.  Overall doing well today.  No issues or complaints.  Currently on room air with no apparent distress.  TYRESE from 2022 showed no evidence of vegetation.  Afebrile, denies diarrhea.     Review of Systems:     Constitutional: no fever, chills and night sweats.  Generalized fatigue.  Eyes: no eye drainage, itching or redness.  HEENT: no mouth sores, dysphagia or nose bleed.  Respiratory: no for shortness of breath, cough or production of sputum.  Cardiovascular: no chest pain, no palpitations, no orthopnea.  Gastrointestinal: no nausea, vomiting or diarrhea. No abdominal pain, hematemesis or rectal bleeding.  Genitourinary: no dysuria  or polyuria.  Hematologic/lymphatic: no lymph node abnormalities, no easy bruising or easy bleeding.  Musculoskeletal: no muscle or joint pain.  Skin: No rash and no itching.  Neurological: no loss of consciousness, no seizure, no headache.  Behavioral/Psych: no depression or suicidal ideation.  Endocrine: no hot flashes.  Immunologic: negative.     ----------------------------------------------------------------------------------------------------------------------        Objective         Current Hospital Meds:  ascorbic acid, 500 mg, Oral, Daily  aspirin, 81 mg, Oral, Daily  DAPTOmycin, 6 mg/kg (Adjusted), Intravenous, Q24H  docusate sodium, 100 mg, Oral, BID  fondaparinux, 2.5 mg, Subcutaneous, Q24H  Insulin Aspart, 0-9 Units, Subcutaneous, TID AC  insulin detemir, 20 Units, Subcutaneous, Q12H  iron polysaccharides, 150 mg, Oral, Daily  levothyroxine, 25 mcg, Oral, Q AM  midodrine, 10 mg, Oral, TID AC  multivitamin with minerals, 1 tablet, Oral, Daily  pantoprazole, 40 mg, Oral, Q AM  pregabalin, 100 mg, Oral, Q12H          Physical Exam:     Constitutional: Elderly, chronically ill-appearing.  Currently on room air with no apparent distress.    HENT:  Head: Normocephalic and atraumatic.  Mouth:  Moist mucous membranes.    Eyes:  Conjunctivae and EOM are normal.  No scleral icterus.  Neck:  Neck supple.  No JVD present.    Cardiovascular:  Normal rate, regular rhythm and normal heart sounds with no murmur. No edema.  Pulmonary/Chest:  No respiratory distress, no wheezes, no crackles, with normal breath sounds and good air movement.  Abdominal:  Soft.  Bowel sounds are normal.  No distension and no tenderness.   Musculoskeletal:  No edema, no tenderness, and no deformity.  No swelling or redness of joints.  Neurological:  Alert and oriented to person, place, and time.  No facial droop.  No slurred speech.   Skin:  Skin is warm and dry.  No rash noted.  No pallor.  Right knee surgical incision healing  appropriately with no signs of infection.   Psychiatric:  Normal mood and affect.  Behavior is normal.           ----------------------------------------------------------------------------------------------------------------------     Pertinent Infectious Disease Results     CK from 10/29/2022 normal at 34.  COVID-19 PCR from 10/28/2022 negative.  Blood cultures from 10/18/2022 2 out of 2 sets positive for MRSA.  Blood cultures from 10/20/2022 2 out of 2 sets positive for MRSA.  Blood cultures from 10/22/2022 1 out of 2 sets positive for MRSA.  Blood cultures from 10/24/2022 show no growth thus far.  2D echo from 10/24/2022 reports no evidence of vegetation.           Assessment/Plan        Assessment         Right knee septic arthritis  MRSA bacteremia  Rule out TV endocarditis              Plan          I saw and examined the patient myself this morning with ROSALINA Caballero with whom I discussed the plan of care and primary RN and here are my findings:     Patient is working with occupational therapy and physical therapy this morning and is going to try to stand up on parallel bars and overall seems to be doing very well without any evidence of acute distress or relapsing sepsis.  He denies any diarrhea and his TYRESE was performed and showed no evidence suggestive of valvular vegetation.     Heart regular rhythm and rate no murmur lungs are clear to auscultation and abdomen is soft with no tenderness and extremities with no edema and knee overall looks great with no erythema and no drainage.     In the setting of negative TYRESE would recommend to continue daptomycin through 11/7/2022 which is 2 weeks from the first negative blood culture.          ROSALINA Caballero  11/02/22  09:23 EDT      Electronically signed by ROSALINA Caballero, 11/02/22, 9:24 AM EDT.  Electronically signed by Marilynn Giordano MD, 11/02/22, 12:10 PM EDT.                 Mike, Michi, PTA   Physical Therapist Assistant  Physical  Therapy  Therapy Treatment Note     Signed  Date of Service:  22 1435  Creation Time:  22     Signed        Expand All Collapse All  Inpatient Rehabilitation - Physical Therapy Treatment Note                                                              YVONNE Eder     Patient Name: Melchor Hardwick III                  : 1965                      MRN: [id number]     Today's Date: 2022                                                                                            Admit Date: 10/28/2022                        Visit Dx:         PT ASSESSMENT (last 12 hours)                IRF PT Evaluation and Treatment             Row Name 22 1400                   PT Time and Intention      Document Type daily treatment  -RG        Mode of Treatment physical therapy  -RG        Patient/Family/Caregiver Comments/Observations Pt was able to sit up in wc today for 1 PT session with BP monitored.  -RG               Row Name 22 1400                   General Information      Patient Profile Reviewed yes  -RG        Existing Precautions/Restrictions fall;brace worn when out of bed  aircast L LE  -RG        Limitations/Impairments other (see comments)  ortho hypo  -RG               Row Name 22 1400                   Cognition/Psychosocial      Personal Safety Interventions gait belt;nonskid shoes/slippers when out of bed;fall prevention program maintained  -RG               Row Name 22 1400                   Mobility      Extremity Weight-bearing Status left lower extremity;right lower extremity  -RG        Left Lower Extremity (Weight-bearing Status) weight-bearing as tolerated (WBAT);other (see comments)  with brace/air cast  -RG        Right Lower Extremity (Weight-bearing Status) weight-bearing as tolerated (WBAT);other (see comments)  -RG               Row Name 22 1400                   Bed Mobility      Bed Mobility supine-sit;sit-supine;scooting/bridging;rolling  "left;rolling right  -RG        Rolling Left [place] (Bed Mobility) modified independence  -RG        Rolling Right [place] (Bed Mobility) modified independence  -RG        Scooting/Bridging [place] (Bed Mobility) modified independence  -RG        Supine-Sit [place] (Bed Mobility) modified independence  -RG        Sit-Supine [place] (Bed Mobility) set up  -               Row Name 11/02/22 1400                   Sit-Stand Transfer      Sit-Stand [place] (Transfers) nonverbal cues (demo/gesture);verbal cues;minimum assist (75% patient effort)  -        Assistive Device (Sit-Stand Transfers) parallel bars  -               Row Name 11/02/22 1400                   Stand-Sit Transfer      Stand-Sit [place] (Transfers) contact guard;verbal cues;nonverbal cues (demo/gesture)  -        Assistive Device (Stand-Sit Transfers) wheelchair;parallel bars  -               Row Name 11/02/22 1400                   Gait/Stairs (Locomotion)      [place] Level (Gait) verbal cues;nonverbal cues (demo/gesture);contact guard  -        Assistive Device (Gait) parallel bars  -        Distance in Feet (Gait) 3'  -        Comment, (Gait/Stairs) c/o \"dizziness\"  -               Row Name 11/02/22 1400                   Hip (Therapeutic Exercise)      Hip Strengthening (Therapeutic Exercise) bilateral;flexion;aBduction;aDduction;external rotation;internal rotation;heel slides;marching while seated;sitting;supine;2 lb free weight;resistance band;green;10 repetitions;2 sets  -               Row Name 11/02/22 1400                   Knee (Therapeutic Exercise)      Knee Strengthening (Therapeutic Exercise) bilateral;flexion;extension;heel slides;marching while seated;SLR (straight leg raise);SAQ (short arc quad);LAQ (long arc quad);sitting;supine;2 lb free weight;resistance band;green;10 repetitions;2 sets  -               Row Name 11/02/22 1400                   Ankle (Therapeutic " Exercise)      Ankle AAROM (Therapeutic Exercise) bilateral;dorsiflexion;plantarflexion;sitting;supine  -RG               Row Name 11/02/22 1400                   Positioning and Restraints      Pre-Treatment Position sitting in chair/recliner  -RG        Post Treatment Position bed  -RG        In Bed notified nsg;supine;call light within reach;encouraged to call for assist;legs elevated  -RG               Row Name 11/02/22 1400                   Vital Signs      Pre Systolic BP Rehab 93  -RG        Pre Treatment Diastolic BP 63  -RG        Intra Systolic BP Rehab 85  -RG        Intra Treatment Diastolic BP 64  -RG        Post Systolic BP Rehab 99  -RG        Post Treatment Diastolic BP 64  -RG        Pre Patient Position Sitting  -RG        Intra Patient Position Standing  -RG        Post Patient Position Sitting  -RG               Row Name 11/02/22 1400                   Therapy Assessment/Plan (PT)      Rehab Potential/Prognosis (PT) good, to achieve stated therapy goals  -RG        Frequency of Treatment (PT) 5 times per week;90 minutes per session  -RG        Estimated Duration of Therapy (PT) 2 weeks;3 weeks  -               Row Name 11/02/22 1400                   Therapy Plan Review/Discharge Plan (PT)      Anticipated Discharge Disposition (PT) home with assist;home with supervision;home with home health  -RG               Row Name 11/02/22 1400                   IRF PT Goals      Bed Mobility Goal Selection (PT-IRF) bed mobility, PT goal 1  -RG        Gait (Walking Locomotion) Goal Selection (PT-IRF) gait, PT goal 1  -RG               Row Name 11/02/22 1400                   Bed Mobility Goal 1 (PT-IRF)      Activity/Assistive Device (Bed Mobility Goal 1, PT-IRF) bed mobility activities, all  -RG        [place] Level (Bed Mobility Goal 1, PT-IRF) independent  -RG        Time Frame (Bed Mobility Goal 1, PT-IRF) long-term goal (LTG)  -               Row Name 11/02/22 1400                    Gait/Walking Locomotion Goal 1 (PT-IRF)      Activity/Assistive Device (Gait/Walking Locomotion Goal 1, PT-IRF) gait (walking locomotion);assistive device use  -RG        Gait/Walking Locomotion Distance Goal 1 (PT-IRF) 30'  -RG        [place] Level (Gait/Walking Locomotion Goal 1, PT-IRF) standby assist;modified independence  -RG        Time Frame (Gait/Walking Locomotion Goal 1, PT-IRF) long-term goal (LTG)  -RG                        User Key  (r) = Recorded By, (t) = Taken By, (c) = Cosigned By             Initials Name Provider Type      RG Michi Mckeon PTA Physical Therapist Assistant                       PT Recommendation and Plan     Frequency of Treatment (PT): 5 times per week, 90 minutes per session           Time Calculation:                PT Charges              Row Name 22 1435 22 1434                    Time Calculation      Start Time 1330  -RG 0915  -RG        Stop Time 1415  -RG 1000  -RG        Time Calculation (min) 45 min  -RG 45 min  -RG        PT Received On 22  -RG 22  -RG                 Time Calculation- PT      Total Timed Code Minutes- PT 45 minute(s)  -RG 45 minute(s)  -RG                      Darcy Gramajo, OT   Occupational Therapist  Occupational Therapy  Therapy Treatment Note     Signed  Date of Service:  22  Creation Time:  22     Signed        Expand All Collapse All                                                                                                                                                                                                                          Inpatient Rehabilitation - Occupational Therapy Treatment Note     YVONNE Gaines     Patient Name: Melchor Hardwick III                  : 1965                      MRN: [id number]     Today's Date: 2022                                                                             Admit Date: 10/28/2022              IRF OT ASSESSMENT FLOWSHEET  (last 12 hours)                IRF OT Evaluation and Treatment             Row Name 11/01/22 1413                   OT Time and Intention      Document Type daily treatment  -        Mode of Treatment individual therapy;occupational therapy  -KP        Total Minutes, Occupational Therapy 90  -KP        Patient Effort good  -KP        Symptoms Noted During/After Treatment fatigue  -               Row Name 11/01/22 1413                   General Information      Patient Profile Reviewed yes  -KP        General Observations of Patient Patient agreeable to therapy. Procedure this am with patient seen for orthostatic hypotension by nursing and cleared for activity up in chair as tolerated. He was seen up in wheelchair in first session and in bed for second session. Lethargic, but able to participate.  -        Existing Precautions/Restrictions fall;brace worn when out of bed  -               Row Name 11/01/22 1413                   Pain Assessment      Pretreatment Pain Rating 0/10 - no pain  -        Posttreatment Pain Rating 0/10 - no pain  -[facility] Name 11/01/22 1413                   Cognition/Psychosocial      Affect/Mental Status (Cognition) WFL  -        Orientation Status (Cognition) oriented x 4  -        Follows Commands (Cognition) WFL  -        Personal Safety Interventions gait belt;nonskid shoes/slippers when out of bed  -               Row Name 11/01/22 1413                   Lower Body Dressing      [place] Level (Lower Body Dressing) moderate assist (50% patient effort);verbal cues;nonverbal cues (demo/gesture)  -        Assistive Device Use (Lower Body Dressing) reacher  -        Position (Lower Body Dressing) edge of bed sitting;supine  -[facility] Name 11/01/22 1413                   Bed Mobility      Supine-Sit [place] (Bed Mobility) minimum assist (75% patient effort);1 person assist  -               Row Name 11/01/22 1413                    Bed-Chair Transfer      Bed-Chair [place] (Transfers) contact guard;1 person assist;verbal cues;1 person to manage equipment  -        Assistive Device (Bed-Chair Transfers) walker, front-wheeled  -               Row Name 22 1413                   Motor Skills      Functional Endurance fair  -        Motor Control/Coordination Interventions therapeutic exercise/ROM  tabletop functional reach AROM with BUEs to complete task; general strengthening -wand; 3 lb dumbbell semi-fowlers in bed  -                        User Key  (r) = Recorded By, (t) = Taken By, (c) = Cosigned By             Initials Name Effective Dates       Darcy Gramajo OT 21 -                              OT Recommendation and Plan       Time Calculation:                Time Calculation- OT              Row Name 22 14222 142                    Time Calculation- OT      OT Start Time 1340  -KP 1030  -        OT Stop Time 1410  -KP 1130  -        OT Time Calculation (min) 30 min  -KP 60 min  -KP        Total Timed Code Minutes- OT 30 minute(s)  -KP 60 minute(s)  -KP        OT Received On -- 22  -                      Darcy Gramajo OT   Occupational Therapist  Occupational Therapy  Therapy Treatment Note     Signed  Date of Service:  22  Creation Time:  22     Signed        Expand All Collapse All                                                                                                                                                                                                                                                                                                                                                                      Inpatient Rehabilitation - Occupational Therapy Treatment Note     YVONNE Gaines     Patient Name: Melchor Hardwick III                  : 1965                      MRN: [id number]     Today's Date: 2022                                                                              Admit Date: 10/28/2022              IRF OT ASSESSMENT FLOWSHEET (last 12 hours)                IRF OT Evaluation and Treatment             Row Name 11/02/22 1502                   OT Time and Intention      Document Type daily treatment  -        Mode of Treatment individual therapy;occupational therapy  -        Total Minutes, Occupational Therapy 90  -KP        Patient Effort good  -KP        Symptoms Noted During/After Treatment none  -               Row Name 11/02/22 1502                   General Information      Patient Profile Reviewed yes  -KP        General Observations of Patient Patient agreeable to therapy. He presents with left shoulder pain along scapular border but improved with gentle ROM/massage.  -        Existing Precautions/Restrictions fall;brace worn when out of bed  -        Limitations/Impairments other (see comments)  -        Comment, General Information orthostatic hypotension  -               Row Name 11/02/22 1502                   Pain Assessment      Pretreatment Pain Rating 3/10  -KP        Posttreatment Pain Rating 1/10  -        Pain Location - other (see comments)  left scapula  -               Row Name 11/02/22 1502                   Cognition/Psychosocial      Affect/Mental Status (Cognition) WFL  -        Orientation Status (Cognition) oriented x 4  -KP        Follows Commands (Cognition) WFL  -        Personal Safety Interventions gait belt;nonskid shoes/slippers when out of bed  -               Row Name 11/02/22 1502                   Grooming      [place] Level (Grooming) grooming skills;set up  -        Position (Grooming) supported sitting  -               Row Name 11/02/22 1502                   Bed Mobility      Supine-Sit [place] (Bed Mobility) modified independence  -               Row Name 11/02/22 1502                   Bed-Chair Transfer      Bed-Chair [place]  (Transfers) contact guard;1 person assist;verbal cues;1 person to manage equipment  -        Assistive Device (Bed-Chair Transfers) walker, front-wheeled  -               Row Name 11/02/22 1502                   Motor Skills      Functional Endurance fair plus  -        Motor Control/Coordination Interventions therapeutic exercise/ROM;fine motor manipulation/dexterity activities;gross motor coordination activities  BUE PRE 15 minutes with rest breaks; bilateral padmini; functional reach FMC/GMC tabletop activities; 3lbs dower adryan through functional movement patterns  -               Row Name 11/02/22 1502                   Positioning and Restraints      Pre-Treatment Position in bed  -KP        Post Treatment Position bed  -        In Bed call light within reach  -               Row Name 11/02/22 1502                               OT Recommendation and Plan        Daily Progress Summary (OT)  Overall Progress Toward Functional Goals (OT): progressing toward functional goals as expected          Time Calculation:                Time Calculation- OT              Row Name 11/02/22 1516 11/02/22 1515                    Time Calculation- OT      OT Start Time 1415  - 0800  -        OT Stop Time 1430  - 0915  -        OT Time Calculation (min) 15 min  -KP 75 min  -        Total Timed Code Minutes- OT 15 minute(s)  -KP 75 minute(s)  -

## 2022-11-03 NOTE — PROGRESS NOTES
Case Management  Inpatient Rehabilitation Team Conference    Conference Date/Time: 11/1/2022 9:18:42 AM    Team Conference Attendees:  MD Ave Alvares SW Stephanie Partin, RN Amy Bolton Green, PT  Mandi Smith OT    Demographics            Age: 57Y            Gender: Male    Admission Date: 10/28/2022 5:02:00 PM  Rehabilitation Diagnosis:  debility  Comorbidities:      Plan of Care  Anticipated Discharge Date/Estimated Length of Stay: 14 days  Anticipated Discharge Destination: Community discharge with assistance  Discharge Plan : Pt plans to return home with sister and brother-in-law at  discharge.  Medical Necessity Expected Level Rationale: good  Intensity and Duration: an average of 3 hours/5 days per week  Medical Supervision and 24 Hour Rehab Nursing: x  Physical Therapy: x  PT Intensity/Duration: PT 1.5 hours per day/5 days per week  Occupational Therapy: x  OT Intensity/Duration: OT 1.5 hours per day/5 days per week  Social Work: x  Therapeutic Recreation: x  Updated (if changes indicated)    Anticipated Discharge Date/Estimated Length of Stay:   11-18-22      Discharge Plan of Care:    Based on the patient's medical and functional status, their prognosis and  expected level of functional improvement is: fair-good      Interdisciplinary Problem/Goals/Status  Body Function Structure    [RN] Skin Integrity(Active)  Current Status(10/28/2022): impaired skin integrity  Weekly Goal(11/04/2022): improved skin integrity  Discharge Goal: wound healing        Mobility    [PT] Bed/Chair/Wheelchair(Active)  Current Status(10/29/2022): MI  Weekly Goal(11/05/2022): MI/I  Discharge Goal: I    [PT] Walk(Active)  Current Status(10/29/2022): N/A d/t ortho hypo  Weekly Goal(11/05/2022): 15' RW CGA  Discharge Goal: 30' RW SBA/MI        Pain    [RN] Pain Management(Active)  Current Status(10/28/2022): potential for increased pain  Weekly Goal(11/04/2022): pain at a tolerable level  Discharge Goal: no  pain        Safety    [RN] Potential for Injury(Active)  Current Status(10/28/2022): potential for falls  Weekly Goal(11/04/2022): no falls  Discharge Goal: no falls        Self Care    [OT] Dressing (Lower)(Active)  Current Status(10/31/2022): mod  Weekly Goal(11/08/2022): min  Discharge Goal: SBA    Comments: Pt plans to return home with sister and brother-in-law at discharge.    Signed by: KAREN Calderon    Physician CoSigned By: Joe Mike 11/03/2022 09:17:29

## 2022-11-03 NOTE — PROGRESS NOTES
PROGRESS NOTE         Patient Identification:  Name:  Melchor Hardwick III  Age:  57 y.o.  Sex:  male  :  1965  MRN:  [id number]  Visit Number:  [id number]  Primary Care Provider:  Missy Up APRN         LOS: 6 days       ----------------------------------------------------------------------------------------------------------------------  Subjective       Chief Complaints:    No chief complaint on file.        Interval History:      The patient is sitting up in bed on room air in no apparent distress.  He is in good spirits and denies any complaints at this time.  Reports that he has been working with occupational and physical therapy regularly.  No new issues or complaints at this time.  Afebrile, no diarrhea.  No new labs today.     Review of Systems:    Constitutional: no fever, chills and night sweats.  Generalized fatigue.  Eyes: no eye drainage, itching or redness.  HEENT: no mouth sores, dysphagia or nose bleed.  Respiratory: no for shortness of breath, cough or production of sputum.  Cardiovascular: no chest pain, no palpitations, no orthopnea.  Gastrointestinal: no nausea, vomiting or diarrhea. No abdominal pain, hematemesis or rectal bleeding.  Genitourinary: no dysuria or polyuria.  Hematologic/lymphatic: no lymph node abnormalities, no easy bruising or easy bleeding.  Musculoskeletal: no muscle or joint pain.  Skin: No rash and no itching.  Neurological: no loss of consciousness, no seizure, no headache.  Behavioral/Psych: no depression or suicidal ideation.  Endocrine: no hot flashes.  Immunologic: negative.    ----------------------------------------------------------------------------------------------------------------------      Objective       \[facility]\"" Meds:  ascorbic acid, 500 mg, Oral, Daily  aspirin, 81 mg, Oral, Daily  DAPTOmycin, 6 mg/kg (Adjusted), Intravenous, Q24H  docusate sodium, 100 mg, Oral, BID  fondaparinux, 2.5 mg, Subcutaneous, Q24H  Insulin Aspart,  0-9 Units, Subcutaneous, TID AC  insulin detemir, 20 Units, Subcutaneous, Q12H  iron polysaccharides, 150 mg, Oral, Daily  levothyroxine, 25 mcg, Oral, Q AM  midodrine, 10 mg, Oral, TID AC  multivitamin with minerals, 1 tablet, Oral, Daily  pantoprazole, 40 mg, Oral, Q AM  pregabalin, 100 mg, Oral, Q12H      Pharmacy Consult,       ----------------------------------------------------------------------------------------------------------------------    Vital Signs:  Temp:  [97.6 °F (36.4 °C)-98 °F (36.7 °C)] 97.6 °F (36.4 °C)  Heart Rate:  [79-85] 85  Resp:  [18] 18  BP: (124-126)/(59-72) 124/72  No data found.  SpO2 Percentage    11/02/22 0700 11/02/22 1900 11/03/22 0742   SpO2: 99% 98% 100%     SpO2:  [98 %-100 %] 100 %  on  Flow (L/min):  [2] 2;   Device (Oxygen Therapy): room air    Body mass index is 28.34 kg/m².  Wt Readings from Last 3 Encounters:   11/01/22 89.6 kg (197 lb 8.5 oz)   09/23/22 98 kg (216 lb)   06/17/22 115 kg (254 lb)        Intake/Output Summary (Last 24 hours) at 11/3/2022 0950  Last data filed at 11/3/2022 0900  Gross per 24 hour   Intake 1680 ml   Output 3375 ml   Net -1695 ml     Diet Regular; Thin; Consistent Carbohydrate  ----------------------------------------------------------------------------------------------------------------------      Physical Exam:    Constitutional: Elderly, chronically ill-appearing male is sitting up in bed on room air in no apparent distress.  HENT:  Head: Normocephalic and atraumatic.  Mouth:  Moist mucous membranes.    Eyes:  Conjunctivae and EOM are normal.  No scleral icterus.  Neck:  Neck supple.  No JVD present.    Cardiovascular:  Normal rate, regular rhythm and normal heart sounds with no murmur. No edema.  Pulmonary/Chest:  No respiratory distress, no wheezes, no crackles, with normal breath sounds and good air movement.  Abdominal:  Soft.  Bowel sounds are normal.  No distension and no tenderness.   Musculoskeletal:  No edema, no tenderness, and  no deformity.  No swelling or redness of joints.  Neurological:  Alert and oriented to person, place, and time.  No facial droop.  No slurred speech.   Skin:  Skin is warm and dry.  No rash noted.  No pallor.  Right knee surgical incision with good healing.  No surrounding erythema, edema, warmth, or drainage.  Psychiatric:  Normal mood and affect.  Behavior is normal.    ----------------------------------------------------------------------------------------------------------------------  Results from last 7 days   Lab Units 10/29/22  0112   CK TOTAL U/L 34           Results from last 7 days   Lab Units 10/31/22  0148 10/30/22  0109 10/29/22  0112 10/28/22  0210   CRP mg/dL  --   --  3.73* 4.00*   WBC 10*3/mm3 3.12* 3.85 2.98* 3.62   HEMOGLOBIN g/dL 8.8* 8.2* 8.0* 8.2*   HEMATOCRIT % 28.8* 26.9* 26.1* 26.4*   MCV fL 84.7 85.1 84.5 85.2   MCHC g/dL 30.6* 30.5* 30.7* 31.1*   PLATELETS 10*3/mm3 102* 96* 90* 106*     Results from last 7 days   Lab Units 10/31/22  0148 10/30/22  0109 10/29/22  0112 10/28/22  0210   SODIUM mmol/L 138 136 133* 136   POTASSIUM mmol/L 4.1 3.9 3.8 3.9   MAGNESIUM mg/dL  --  2.1 1.8  --    CHLORIDE mmol/L 103 102 101 102   CO2 mmol/L 26.6 26.1 25.3 24.9   BUN mg/dL 9 11 10 11   CREATININE mg/dL 0.61* 0.55* 0.63* 0.60*   CALCIUM mg/dL 8.0* 8.1* 8.2* 8.1*   GLUCOSE mg/dL 154* 211* 152* 179*   ALBUMIN g/dL  --   --  2.13* 2.29*   BILIRUBIN mg/dL  --   --  0.4 0.4   ALK PHOS U/L  --   --  289* 311*   AST (SGOT) U/L  --   --  30 33   ALT (SGPT) U/L  --   --  26 29   Estimated Creatinine Clearance: 150.4 mL/min (A) (by C-G formula based on SCr of 0.61 mg/dL (L)).  No results found for: AMMONIA    Glucose   Date/Time Value Ref Range Status   11/03/2022 0600 87 70 - 130 mg/dL Final     Comment:     Meter: [id number] : [id number] Susi Velez   11/02/2022 1945 237 (H) 70 - 130 mg/dL Final     Comment:     Meter: [id number] : [id number] Friedheim Crystal   11/02/2022 1552 362 (H) 70 - 130 mg/dL  Final     Comment:     Meter: [id number] : [id number] mark massengill   11/02/2022 1103 196 (H) 70 - 130 mg/dL Final     Comment:     Meter: [id number] : [id number] mark massengill   11/02/2022 0659 139 (H) 70 - 130 mg/dL Final     Comment:     Meter: [id number] : [id number] Egnar Crystal   11/01/2022 2020 299 (H) 70 - 130 mg/dL Final     Comment:     Meter: [id number] : [id number] Egnar Crystal   11/01/2022 1557 207 (H) 70 - 130 mg/dL Final     Comment:     Meter: [id number] : [id number] lisa ashley   11/01/2022 1105 117 70 - 130 mg/dL Final     Comment:     Meter: [id number] : [id number] lisa ashley     Lab Results   Component Value Date    HGBA1C 8.80 (H) 09/20/2022     Lab Results   Component Value Date    TSH 2.390 10/10/2022    FREET4 0.87 (L) 10/10/2022       Blood Culture   Date Value Ref Range Status   10/24/2022 No growth at 5 days  Final   10/24/2022 No growth at 5 days  Final     No results found for: URINECX  No results found for: WOUNDCX  No results found for: STOOLCX  No results found for: RESPCX  Pain Management Panel       Pain Management Panel Latest Ref Rng & Units 5/24/2022 5/11/2022    CREATININE UR mg/dL 91.0 -    AMPHETAMINES SCREEN, URINE Negative - Negative    BARBITURATES SCREEN Negative - Negative    BENZODIAZEPINE SCREEN, URINE Negative - Negative    BUPRENORPHINEUR Negative - Negative    COCAINE SCREEN, URINE Negative - Negative    METHADONE SCREEN, URINE Negative - Negative    METHAMPHETAMINEUR Negative - Negative              ----------------------------------------------------------------------------------------------------------------------  Imaging Results (Last 24 Hours)       ** No results found for the last 24 hours. **            ----------------------------------------------------------------------------------------------------------------------    Pertinent Infectious Disease Results    CK from 10/29/2022 normal at 34.  COVID-19 PCR from  10/28/2022 negative.  Blood cultures from 10/18/2022 2 out of 2 sets positive for MRSA.  Blood cultures from 10/20/2022 2 out of 2 sets positive for MRSA.  Blood cultures from 10/22/2022 1 out of 2 sets positive for MRSA.  Blood cultures from 10/24/2022 finalized as no growth.  2D echo from 10/24/2022 reports no evidence of vegetation.  TYRESE on 11/1/2022 showed no evidence of vegetations or abscesses.    Assessment/Plan       Assessment     Right knee septic arthritis  MRSA bacteremia  Rule out TV endocarditis      Plan      I examined the patient this morning and he is sitting up in bed on room air in no apparent distress.  Denies any new complaints at this time and is in good spirits this morning.  Reports that he regularly works with occupational and physical therapy and seems to be progressing well.  Right knee surgical incision seems to be healing very well without surrounding erythema, edema, warmth, or drainage.  Remains afebrile without diarrhea.  Lungs are clear to auscultation bilaterally and patient has no murmur.  No new labs at this time.  Recent TYRESE showed no evidence of vegetation.    Recommend to continue on daptomycin through 11/7/2022, which is 2 weeks from the first negative blood culture.    Code Status:   Code Status and Medical Interventions:   Ordered at: 10/28/22 1384     Code Status (Patient has no pulse and is not breathing):    CPR (Attempt to Resuscitate)     Medical Interventions (Patient has pulse or is breathing):    Full Support       Sara Sales PA-C  11/03/22  09:50 EDT

## 2022-11-03 NOTE — THERAPY TREATMENT NOTE
Inpatient Rehabilitation - Physical Therapy Treatment Note       YVONNE Gaines     Patient Name: Melchor Hardwick III  : 1965  MRN: [id number]    Today's Date: 11/3/2022                    Admit Date: 10/28/2022      Visit Dx:     ICD-10-CM ICD-9-CM   1. Staphylococcal arthritis of right knee (HCC)  M00.061 711.06     041.10       Patient Active Problem List   Diagnosis   • Hyperlipidemia   • Hypertensive disorder   • Obesity   • Type 2 diabetes mellitus with hyperglycemia, with long-term current use of insulin (HCC)   • Gas gangrene of foot (HCC)   • Cellulitis in diabetic foot (HCC)   • Other acute osteomyelitis of left foot (HCC)   • Osteomyelitis (HCC)   • Critical illness myopathy   • Staphylococcal arthritis of right knee (HCC)   • Other cirrhosis of liver (HCC)   • MRSA bacteremia   • Endocarditis of tricuspid valve   • Wound of right buttock   • History of osteomyelitis   • Debility       Past Medical History:   Diagnosis Date   • Diabetes mellitus (HCC)    • Hyperlipidemia    • Hypertension    • Pyogenic arthritis of right knee joint, due to unspecified organism (HCC) 2022       Past Surgical History:   Procedure Laterality Date   • ABSCESS DRAINAGE      PERINEUM   • AMPUTATION FOOT Left 2022    Procedure: AMPUTATION FOOT;  Surgeon: Addison Colby MD;  Location: [facility] OR;  Service: Podiatry;  Laterality: Left;   • INCISION AND DRAINAGE LEG Left 05/10/2022    Procedure: INCISION AND DRAINAGE LOWER EXTREMITY;  Surgeon: Addison Colby MD;  Location:  [facility] OR;  Service: Podiatry;  Laterality: Left;   • KNEE INCISION AND DRAINAGE Right 2022    Procedure: KNEE INCISION AND DRAINAGE RIGHT;  Surgeon: Brain Bentley MD;  Location: [facility] OR;  Service: Orthopedics;  Laterality: Right;   • WOUND DEBRIDEMENT Left 2022    Procedure: DEBRIDEMENT FOOT;  Surgeon: Addisno Colby MD;  Location: [facility] OR;  Service: Podiatry;  Laterality: Left;       PT ASSESSMENT (last 12 hours)     IRF PT  Evaluation and Treatment     Row Name 11/03/22 1534 11/03/22 1434       PT Time and Intention    Document Type daily treatment  -HR daily treatment  -RG    Mode of Treatment physical therapy  -HR physical therapy  -RG    Patient/Family/Caregiver Comments/Observations Pt and RN in agreement foer PT. Pt completed sitting exercises until tolerance with added Wt and resistance. Bed to chair transfer CGA/min A with RW  -HR Pt and nursing in agreement for skilled PT on this date. Pt was able to sit in wc for longer periods of time.  When pt stood in // bars his BP dropped and needed to sit down. Unable to ambulate secondary to BP dropping.  -    Row Name 11/03/22 1534 11/03/22 1434       General Information    Patient Profile Reviewed yes  -HR yes  -RG    Existing Precautions/Restrictions fall;brace worn when out of bed  aircast L LE  -HR fall;brace worn when out of bed  aircast L LE  -RG    Limitations/Impairments other (see comments)  ortho hypo  -HR other (see comments)  ortho hypo  -RG    Row Name 11/03/22 1534 11/03/22 1434       Cognition/Psychosocial    Orientation Status (Cognition) oriented x 4  -HR oriented x 4  -RG    Follows Commands (Cognition) WFL  -HR WFL  -RG    Personal Safety Interventions fall prevention program maintained;gait belt;nonskid shoes/slippers when out of bed;supervised activity  -HR gait belt;nonskid shoes/slippers when out of bed;fall prevention program maintained  -    Row Name 11/03/22 1434          Range of Motion Comprehensive    Comment, General Range of Motion Therapist provided ROM to R knee with pt in sittng position.  -     Row Name 11/03/22 1534 11/03/22 1434       Mobility    Extremity Weight-bearing Status left lower extremity;right lower extremity  -HR left lower extremity;right lower extremity  -RG    Left Lower Extremity (Weight-bearing Status) weight-bearing as tolerated (WBAT);other (see comments)  with brace/air cast  -HR weight-bearing as tolerated (WBAT);other  (see comments)  with brace/air cast  -RG    Right Lower Extremity (Weight-bearing Status) weight-bearing as tolerated (WBAT);other (see comments)  -HR weight-bearing as tolerated (WBAT);other (see comments)  -RG    Row Name 11/03/22 1534 11/03/22 1434       Bed Mobility    Bed Mobility supine-sit;sit-supine;scooting/bridging;rolling left;rolling right  -HR supine-sit;sit-supine;scooting/bridging;rolling left;rolling right  -RG    Rolling Left [place] (Bed Mobility) modified independence  -HR modified independence  -RG    Rolling Right [place] (Bed Mobility) modified independence  -HR modified independence  -RG    Scooting/Bridging [place] (Bed Mobility) modified independence  -HR modified independence  -RG    Supine-Sit [place] (Bed Mobility) modified independence  -HR modified independence  -RG    Sit-Supine [place] (Bed Mobility) set up  -HR set up  -RG    Row Name 11/03/22 1534          Bed-Chair Transfer    Bed-Chair [place] (Transfers) minimum assist (75% patient effort);nonverbal cues (demo/gesture);verbal cues;contact guard  -HR     Assistive Device (Bed-Chair Transfers) walker, front-wheeled;wheelchair  -HR     Row Name 11/03/22 1534 11/03/22 1434       Sit-Stand Transfer    Sit-Stand [place] (Transfers) nonverbal cues (demo/gesture);verbal cues;minimum assist (75% patient effort);contact guard  -HR nonverbal cues (demo/gesture);verbal cues;minimum assist (75% patient effort)  -RG    Assistive Device (Sit-Stand Transfers) walker, front-wheeled  -HR parallel bars  -RG    Row Name 11/03/22 1534 11/03/22 1434       Stand-Sit Transfer    Stand-Sit [place] (Transfers) contact guard;verbal cues;nonverbal cues (demo/gesture)  -HR contact guard;verbal cues;nonverbal cues (demo/gesture)  -RG    Assistive Device (Stand-Sit Transfers) wheelchair;walker, front-wheeled  -HR wheelchair;parallel bars  -RG    Row Name 11/03/22 1434          Gait/Stairs (Locomotion)    [place]  Level (Gait) verbal cues;nonverbal cues (demo/gesture);contact guard  -RG     Assistive Device (Gait) parallel bars  -RG     Comment, (Gait/Stairs) unable secondary to being hypotensive when standing  -RG     Row Name 22 143       Balance    Static Standing Balance -- contact guard;supervision;verbal cues  hypotensive and needed to sit  -RG    Comment, Balance Sittin# AP, LAQ, March, ball sq, GTB: HS curls, hip abd, march  -HR --    Row Name 22 143       Hip (Therapeutic Exercise)    Hip Strengthening (Therapeutic Exercise) bilateral;flexion;extension;aBduction;aDduction;marching while seated;sitting;2 lb free weight;resistance band;green  -HR bilateral;flexion;aBduction;aDduction;marching while seated;sitting;2 lb free weight;resistance band;green;10 repetitions;2 sets  -RG    Row Name 22       Knee (Therapeutic Exercise)    Knee Strengthening (Therapeutic Exercise) bilateral;flexion;extension;marching while seated;LAQ (long arc quad);hamstring curls;sitting;2 lb free weight;green  -HR bilateral;flexion;extension;marching while seated;LAQ (long arc quad);sitting;2 lb free weight;resistance band;green;10 repetitions;2 sets  -RG    Row Name 22       Ankle (Therapeutic Exercise)    Ankle AAROM (Therapeutic Exercise) right;bilateral;dorsiflexion;plantarflexion  -HR bilateral;dorsiflexion;plantarflexion;sitting;10 repetitions;2 sets  -RG    Row Name 22 143       Positioning and Restraints    Pre-Treatment Position in bed  -HR sitting in chair/recliner  -RG    Post Treatment Position wheelchair  -HR wheelchair  -RG    In Wheelchair with PT  -HR notified nsg;sitting;with other staff  -RG    Row Name 22       Therapy Assessment/Plan (PT)    Rehab Potential/Prognosis (PT) good, to achieve stated therapy goals  -HR good, to achieve stated therapy goals  -RG    Frequency of Treatment  (PT) 5 times per week;90 minutes per session  -HR 5 times per week;90 minutes per session  -RG    Estimated Duration of Therapy (PT) 2 weeks;3 weeks  -HR 2 weeks;3 weeks  -RG    Row Name 11/03/22 1534 11/03/22 1434       Therapy Plan Review/Discharge Plan (PT)    Anticipated Discharge Disposition (PT) home with assist;home with supervision;home with home health  -HR home with assist;home with supervision;home with home health  -RG    Row Name 11/03/22 1534 11/03/22 1434       IRF PT Goals    Bed Mobility Goal Selection (PT-IRF) bed mobility, PT goal 1  -HR bed mobility, PT goal 1  -RG    Gait (Walking Locomotion) Goal Selection (PT-IRF) gait, PT goal 1  -HR gait, PT goal 1  -RG    Row Name 11/03/22 1534 11/03/22 1434       Bed Mobility Goal 1 (PT-IRF)    Activity/Assistive Device (Bed Mobility Goal 1, PT-IRF) bed mobility activities, all  -HR bed mobility activities, all  -RG    [place] Level (Bed Mobility Goal 1, PT-IRF) independent  -HR independent  -RG    Time Frame (Bed Mobility Goal 1, PT-IRF) long-term goal (LTG)  -HR long-term goal (LTG)  -RG    Row Name 11/03/22 1534 11/03/22 1434       Gait/Walking Locomotion Goal 1 (PT-IRF)    Activity/Assistive Device (Gait/Walking Locomotion Goal 1, PT-IRF) gait (walking locomotion);assistive device use  -HR gait (walking locomotion);assistive device use  -RG    Gait/Walking Locomotion Distance Goal 1 (PT-IRF) 30'  -HR 30'  -RG    [place] Level (Gait/Walking Locomotion Goal 1, PT-IRF) standby assist;modified independence  -HR standby assist;modified independence  -RG    Time Frame (Gait/Walking Locomotion Goal 1, PT-IRF) long-term goal (LTG)  -HR long-term goal (LTG)  -RG          User Key  (r) = Recorded By, (t) = Taken By, (c) = Cosigned By    Initials Name Provider Type    RG Michi Mckeon PTA Physical Therapist Assistant    HR Hanna Murphy PTA Physical Therapist Assistant              Wound 09/21/22 1532 Right anterior knee Incision (Active)   Dressing  Appearance open to air 11/03/22 0838   Closure Approximated 11/03/22 0838   Base dry;clean 11/03/22 0838   Care, Wound cleansed with 11/03/22 0838   Dressing Care open to air 11/03/22 0838       Wound 10/28/22 1827 Right coccyx Pressure Injury (Active)   Dressing Appearance open to air 11/03/22 0838   Closure None 11/03/22 0838   Base red;non-blanchable 11/03/22 0838   Drainage Amount none 11/03/22 0838   Care, Wound barrier applied;pressure removed 11/03/22 0838   Dressing Care open to air 11/03/22 0838     Physical Therapy Education     Title: PT OT SLP Therapies (Done)     Topic: Physical Therapy (Done)     Point: Mobility training (Done)     Learning Progress Summary           Patient Acceptance, E,TB, VU,DU by HR at 11/3/2022 1541    Acceptance, E,D, VU,NR by RG at 11/3/2022 1442    Acceptance, E,TB, VU by DG at 11/1/2022 2016    Acceptance, E,D, VU,NR by RG at 11/1/2022 1541    Acceptance, E,TB, VU by DG at 10/31/2022 2315    Acceptance, E,D, VU,NR by RG at 10/31/2022 1446                   Point: Home exercise program (Done)     Learning Progress Summary           Patient Acceptance, E,TB, VU,DU by HR at 11/3/2022 1541    Acceptance, E,D, VU,NR by RG at 11/3/2022 1442    Acceptance, E,TB, VU by DG at 11/1/2022 2016    Acceptance, E,D, VU,NR by RG at 11/1/2022 1541    Acceptance, E,TB, VU by DG at 10/31/2022 2315    Acceptance, E,D, VU,NR by RG at 10/31/2022 1446                   Point: Body mechanics (Done)     Learning Progress Summary           Patient Acceptance, E,TB, VU,DU by HR at 11/3/2022 1541    Acceptance, E,D, VU,NR by RG at 11/3/2022 1442    Acceptance, E,TB, VU by DG at 11/1/2022 2016    Acceptance, E,D, VU,NR by RG at 11/1/2022 1541    Acceptance, E,TB, VU by DG at 10/31/2022 2315    Acceptance, E,D, VU,NR by RG at 10/31/2022 1446                   Point: Precautions (Done)     Learning Progress Summary           Patient Acceptance, E,TB, VU,DU by HR at 11/3/2022 1541    Acceptance, E,D, VU,NR  by  at 11/3/2022 1442    Acceptance, E,TB, VU by BRYSON at 11/1/2022 2016    Acceptance, E,D, VU,NR by RG at 11/1/2022 1541    Acceptance, E,TB, VU by  at 10/31/2022 2315    Acceptance, E,D, VU,NR by RG at 10/31/2022 1446                               User Key     Initials Effective Dates Name Provider Type Discipline     06/16/21 -  Phyllis Yañez RN Registered Nurse Nurse     06/16/21 -  Michi Mckeon PTA Physical Therapist Assistant PT    HR 01/14/22 -  Hanna Murphy PTA Physical Therapist Assistant PT                PT Recommendation and Plan    Frequency of Treatment (PT): 5 times per week, 90 minutes per session                     Time Calculation:      PT Charges     Row Name 11/03/22 1542 11/03/22 1448 11/03/22 1443       Time Calculation    Start Time 1000  -HR 1330  -RG 1045  -RG    Stop Time 1045  -HR 1415  -RG 1130  -RG    Time Calculation (min) 45 min  -HR 45 min  -RG 45 min  -RG    PT Received On 11/03/22  -HR 11/03/22  -RG 11/03/22  -RG       Time Calculation- PT    Total Timed Code Minutes- PT 45 minute(s)  -HR 45 minute(s)  -RG 45 minute(s)  -RG          User Key  (r) = Recorded By, (t) = Taken By, (c) = Cosigned By    Initials Name Provider Type     Michi Mckeon PTA Physical Therapist Assistant    HR Hanna Murphy PTA Physical Therapist Assistant                Therapy Charges for Today     Code Description Service Date Service Provider Modifiers Qty    57655857120 HC PT THER PROC EA 15 MIN 11/3/2022 Hanna Murphy PTA GP, CQ 2    49640047165 HC PT THERAPEUTIC ACT EA 15 MIN 11/3/2022 Hanna Murphy PTA GP, CQ 1                   Hanna Murphy PTA  11/3/2022

## 2022-11-03 NOTE — PROGRESS NOTES
Rehabilitation Nursing  Inpatient Rehabilitation Plan of Care Note    Plan of Care  Copy from Junie    Pain Management (Active)  Current Status (10/28/2022 5:07:00 PM): potential for increased pain  Weekly Goal: pain at a tolerable level  Discharge Goal: no pain    Body Function Structure    Skin Integrity (Active)  Current Status (10/28/2022 5:02:00 PM): impaired skin integrity  Weekly Goal: improved skin integrity  Discharge Goal: wound healing    Safety    Potential for Injury (Active)  Current Status (10/28/2022 5:07:00 PM): potential for falls  Weekly Goal: no falls  Discharge Goal: no falls    Signed by: Rosie Goldman, Nurse

## 2022-11-03 NOTE — PROGRESS NOTES
Cardiology-    TYRESE images are reviewed.  There was an echodense structure seen in the right atrial cavity able the posterior tricuspid leaflet.  It was not attached to the leaflet.  The appearance and location is not typical fora  vegetation.  This could be a normal variant.  Tricuspid valve structure was normal with no evidence of stenosis or regurgitation.      In view of presence of MRSA bacteremia, I would recommend with antibiotic for infective endocarditis.  I spoke with Dr. Mike.  I was not able to contact Dr. Giordano and a discussed with this PA.    I discussed the above with the patient in detail and I will follow him up in my office on 2/1/2023 to consider a repeat TYRESE.       Adonay Gibson MD

## 2022-11-03 NOTE — THERAPY TREATMENT NOTE
Inpatient Rehabilitation - Physical Therapy Treatment Note       YVONNE Gaines     Patient Name: Melchor Hardwick III  : 1965  MRN: [id number]    Today's Date: 11/3/2022                    Admit Date: 10/28/2022      Visit Dx:     ICD-10-CM ICD-9-CM   1. Staphylococcal arthritis of right knee (HCC)  M00.061 711.06     041.10       Patient Active Problem List   Diagnosis   • Hyperlipidemia   • Hypertensive disorder   • Obesity   • Type 2 diabetes mellitus with hyperglycemia, with long-term current use of insulin (HCC)   • Gas gangrene of foot (HCC)   • Cellulitis in diabetic foot (HCC)   • Other acute osteomyelitis of left foot (HCC)   • Osteomyelitis (HCC)   • Critical illness myopathy   • Staphylococcal arthritis of right knee (HCC)   • Other cirrhosis of liver (HCC)   • MRSA bacteremia   • Endocarditis of tricuspid valve   • Wound of right buttock   • History of osteomyelitis   • Debility       Past Medical History:   Diagnosis Date   • Diabetes mellitus (HCC)    • Hyperlipidemia    • Hypertension    • Pyogenic arthritis of right knee joint, due to unspecified organism (HCC) 2022       Past Surgical History:   Procedure Laterality Date   • ABSCESS DRAINAGE      PERINEUM   • AMPUTATION FOOT Left 2022    Procedure: AMPUTATION FOOT;  Surgeon: Addison Colby MD;  Location: [facility] OR;  Service: Podiatry;  Laterality: Left;   • INCISION AND DRAINAGE LEG Left 05/10/2022    Procedure: INCISION AND DRAINAGE LOWER EXTREMITY;  Surgeon: Addison Colby MD;  Location:  [facility] OR;  Service: Podiatry;  Laterality: Left;   • KNEE INCISION AND DRAINAGE Right 2022    Procedure: KNEE INCISION AND DRAINAGE RIGHT;  Surgeon: Brain Bentley MD;  [facility];  Service: Orthopedics;  Laterality: Right;   • WOUND DEBRIDEMENT Left 2022    Procedure: DEBRIDEMENT FOOT;  Surgeon: Addison Colby MD;  Location: [facility] OR;  Service: Podiatry;  Laterality: Left;       PT ASSESSMENT (last 12 hours)     IRF PT  Evaluation and Treatment     Row Name 11/03/22 1434          PT Time and Intention    Document Type daily treatment  -RG     Mode of Treatment physical therapy  -RG     Patient/Family/Caregiver Comments/Observations Pt and nursing in agreement for skilled PT on this date. Pt was able to sit in wc for longer periods of time.  When pt stood in // bars his BP dropped and needed to sit down. Unable to ambulate secondary to BP dropping.  -RG     Row Name 11/03/22 1434          General Information    Patient Profile Reviewed yes  -RG     Existing Precautions/Restrictions fall;brace worn when out of bed  aircast L LE  -RG     Limitations/Impairments other (see comments)  ortho hypo  -RG     Row Name 11/03/22 1434          Cognition/Psychosocial    Orientation Status (Cognition) oriented x 4  -RG     Follows Commands (Cognition) WFL  -RG     Personal Safety Interventions gait belt;nonskid shoes/slippers when out of bed;fall prevention program maintained  -RG     Row Name 11/03/22 1434          Range of Motion Comprehensive    Comment, General Range of Motion Therapist provided ROM to R knee with pt in sittng position.  -RG     Row Name 11/03/22 1434          Mobility    Extremity Weight-bearing Status left lower extremity;right lower extremity  -RG     Left Lower Extremity (Weight-bearing Status) weight-bearing as tolerated (WBAT);other (see comments)  with brace/air cast  -RG     Right Lower Extremity (Weight-bearing Status) weight-bearing as tolerated (WBAT);other (see comments)  -RG     Row Name 11/03/22 1434          Bed Mobility    Bed Mobility supine-sit;sit-supine;scooting/bridging;rolling left;rolling right  -RG     Rolling Left [place] (Bed Mobility) modified independence  -RG     Rolling Right [place] (Bed Mobility) modified independence  -RG     Scooting/Bridging [place] (Bed Mobility) modified independence  -RG     Supine-Sit [place] (Bed Mobility) modified independence  -RG     Sit-Supine  [place] (Bed Mobility) set up  -     Row Name 11/03/22 1434          Sit-Stand Transfer    Sit-Stand [place] (Transfers) nonverbal cues (demo/gesture);verbal cues;minimum assist (75% patient effort)  -     Assistive Device (Sit-Stand Transfers) parallel bars  -     Row Name 11/03/22 1434          Stand-Sit Transfer    Stand-Sit [place] (Transfers) contact guard;verbal cues;nonverbal cues (demo/gesture)  -RG     Assistive Device (Stand-Sit Transfers) wheelchair;parallel bars  -     Row Name 11/03/22 1434          Gait/Stairs (Locomotion)    [place] Level (Gait) verbal cues;nonverbal cues (demo/gesture);contact guard  -     Assistive Device (Gait) parallel [facility]  -     Comment, (Gait/Stairs) unable secondary to being hypotensive when standing  -     Row Name 11/03/22 1434          Balance    Static Standing Balance contact guard;supervision;verbal cues  hypotensive and needed to sit  -     Row Name 11/03/22 1434          Hip (Therapeutic Exercise)    Hip Strengthening (Therapeutic Exercise) bilateral;flexion;aBduction;aDduction;marching while seated;sitting;2 lb free weight;resistance band;green;10 repetitions;2 sets  -     Row Name 11/03/22 1434          Knee (Therapeutic Exercise)    Knee Strengthening (Therapeutic Exercise) bilateral;flexion;extension;marching while seated;LAQ (long arc quad);sitting;2 lb free weight;resistance band;green;10 repetitions;2 sets  -     Row Name 11/03/22 1434          Ankle (Therapeutic Exercise)    Ankle AAROM (Therapeutic Exercise) bilateral;dorsiflexion;plantarflexion;sitting;10 repetitions;2 sets  -     Row Name 11/03/22 1434          Positioning and Restraints    Pre-Treatment Position sitting in chair/recliner  -     Post Treatment Position wheelchair  -RG     In Wheelchair notified nsg;sitting;with other staff  -     Row Name 11/03/22 1434          Therapy Assessment/Plan (PT)    Rehab Potential/Prognosis (PT) good, to achieve stated  therapy goals  -RG     Frequency of Treatment (PT) 5 times per week;90 minutes per session  -RG     Estimated Duration of Therapy (PT) 2 weeks;3 weeks  -RG     Row Name 11/03/22 1434          Therapy Plan Review/Discharge Plan (PT)    Anticipated Discharge Disposition (PT) home with assist;home with supervision;home with home health  -RG     Row Name 11/03/22 1434          IRF PT Goals    Bed Mobility Goal Selection (PT-IRF) bed mobility, PT goal 1  -RG     Gait (Walking Locomotion) Goal Selection (PT-IRF) gait, PT goal 1  -RG     Row Name 11/03/22 1434          Bed Mobility Goal 1 (PT-IRF)    Activity/Assistive Device (Bed Mobility Goal 1, PT-IRF) bed mobility activities, all  -RG     [place] Level (Bed Mobility Goal 1, PT-IRF) independent  -RG     Time Frame (Bed Mobility Goal 1, PT-IRF) long-term goal (LTG)  -RG     Row Name 11/03/22 1434          Gait/Walking Locomotion Goal 1 (PT-IRF)    Activity/Assistive Device (Gait/Walking Locomotion Goal 1, PT-IRF) gait (walking locomotion);assistive device use  -RG     Gait/Walking Locomotion Distance Goal 1 (PT-IRF) 30'  -RG     [place] Level (Gait/Walking Locomotion Goal 1, PT-IRF) standby assist;modified independence  -RG     Time Frame (Gait/Walking Locomotion Goal 1, PT-IRF) long-term goal (LTG)  -RG           User Key  (r) = Recorded By, (t) = Taken By, (c) = Cosigned By    Initials Name Provider Type    RG Michi Mckeon, LIZETT Physical Therapist Assistant              Wound 09/21/22 1532 Right anterior knee Incision (Active)   Dressing Appearance open to air 11/03/22 0838   Closure Approximated 11/03/22 0838   Base dry;clean 11/03/22 0838   Care, Wound cleansed with 11/03/22 0838   Dressing Care open to air 11/03/22 0838       Wound 10/28/22 1827 Right coccyx Pressure Injury (Active)   Dressing Appearance open to air 11/03/22 0838   Closure None 11/03/22 0838   Base red;non-blanchable 11/03/22 0838   Drainage Amount none 11/03/22 0838   Care, Wound barrier  applied;pressure removed 11/03/22 0838   Dressing Care open to air 11/03/22 0838     Physical Therapy Education     Title: PT OT SLP Therapies (Done)     Topic: Physical Therapy (Done)     Point: Mobility training (Done)     Learning Progress Summary           Patient Acceptance, E,D, VU,NR by RG at 11/3/2022 1442    Acceptance, E,TB, VU by  at 11/1/2022 2016    Acceptance, E,D, VU,NR by RG at 11/1/2022 1541    Acceptance, E,TB, VU by  at 10/31/2022 2315    Acceptance, E,D, VU,NR by RG at 10/31/2022 1446                   Point: Home exercise program (Done)     Learning Progress Summary           Patient Acceptance, E,D, VU,NR by RG at 11/3/2022 1442    Acceptance, E,TB, VU by DG at 11/1/2022 2016    Acceptance, E,D, VU,NR by RG at 11/1/2022 1541    Acceptance, E,TB, VU by  at 10/31/2022 2315    Acceptance, E,D, VU,NR by RG at 10/31/2022 1446                   Point: Body mechanics (Done)     Learning Progress Summary           Patient Acceptance, E,D, VU,NR by RG at 11/3/2022 1442    Acceptance, E,TB, VU by  at 11/1/2022 2016    Acceptance, E,D, VU,NR by RG at 11/1/2022 1541    Acceptance, E,TB, VU by  at 10/31/2022 2315    Acceptance, E,D, VU,NR by RG at 10/31/2022 1446                   Point: Precautions (Done)     Learning Progress Summary           Patient Acceptance, E,D, VU,NR by RG at 11/3/2022 1442    Acceptance, E,TB, VU by  at 11/1/2022 2016    Acceptance, E,D, VU,NR by RG at 11/1/2022 1541    Acceptance, E,TB, VU by  at 10/31/2022 2315    Acceptance, E,D, VU,NR by  at 10/31/2022 1446                               User Key     Initials Effective Dates Name Provider Type Discipline     06/16/21 -  Phyllis Yañez RN Registered Nurse Nurse     06/16/21 -  Mike, Michi, PTA Physical Therapist Assistant PT                PT Recommendation and Plan    Frequency of Treatment (PT): 5 times per week, 90 minutes per session                     Time Calculation:      PT Charges     Row Name  11/03/22 1448 11/03/22 1443          Time Calculation    Start Time 1330  -RG 1045  -RG     Stop Time 1415  -RG 1130  -RG     Time Calculation (min) 45 min  -RG 45 min  -RG     PT Received On 11/03/22  -RG 11/03/22  -RG        Time Calculation- PT    Total Timed Code Minutes- PT 45 minute(s)  -RG 45 minute(s)  -RG           User Key  (r) = Recorded By, (t) = Taken By, (c) = Cosigned By    Initials Name Provider Type    Michi White PTA Physical Therapist Assistant                Therapy Charges for Today     Code Description Service Date Service Provider Modifiers Qty    94775290308 HC GAIT TRAINING EA 15 MIN 11/2/2022 Michi Mckeon, LIZETT GP, CQ 1    19481444200 HC PT THERAPEUTIC ACT EA 15 MIN 11/2/2022 Michi Mckeon PTA GP, CQ 2    70960926032 HC PT THER PROC EA 15 MIN 11/2/2022 Michi Mckeon PTA GP, CQ 3    48454808630 HC PT NEUROMUSC RE EDUCATION EA 15 MIN 11/3/2022 Michi Mckeon PTA GP, CQ 1    30132608269 HC PT THERAPEUTIC ACT EA 15 MIN 11/3/2022 Michi Mckeon PTA GP, CQ 2    82969303337 HC PT THER PROC EA 15 MIN 11/3/2022 Michi Mckeon PTA GP, CQ 3                   Michi Mckeon PTA  11/3/2022

## 2022-11-04 LAB
GLUCOSE BLDC GLUCOMTR-MCNC: 149 MG/DL (ref 70–130)
GLUCOSE BLDC GLUCOMTR-MCNC: 188 MG/DL (ref 70–130)
GLUCOSE BLDC GLUCOMTR-MCNC: 256 MG/DL (ref 70–130)
GLUCOSE BLDC GLUCOMTR-MCNC: 279 MG/DL (ref 70–130)
LV EF 2D ECHO EST: 60 %
MAXIMAL PREDICTED HEART RATE: 163 BPM
STRESS TARGET HR: 139 BPM

## 2022-11-04 PROCEDURE — 97530 THERAPEUTIC ACTIVITIES: CPT

## 2022-11-04 PROCEDURE — 97116 GAIT TRAINING THERAPY: CPT

## 2022-11-04 PROCEDURE — 97535 SELF CARE MNGMENT TRAINING: CPT

## 2022-11-04 PROCEDURE — 63710000001 INSULIN DETEMIR PER 5 UNITS: Performed by: FAMILY MEDICINE

## 2022-11-04 PROCEDURE — 63710000001 INSULIN ASPART PER 5 UNITS: Performed by: FAMILY MEDICINE

## 2022-11-04 PROCEDURE — 25010000002 DAPTOMYCIN PER 1 MG: Performed by: FAMILY MEDICINE

## 2022-11-04 PROCEDURE — 97110 THERAPEUTIC EXERCISES: CPT

## 2022-11-04 PROCEDURE — 99233 SBSQ HOSP IP/OBS HIGH 50: CPT | Performed by: PHYSICIAN ASSISTANT

## 2022-11-04 PROCEDURE — 99231 SBSQ HOSP IP/OBS SF/LOW 25: CPT | Performed by: FAMILY MEDICINE

## 2022-11-04 PROCEDURE — 97112 NEUROMUSCULAR REEDUCATION: CPT

## 2022-11-04 PROCEDURE — 82962 GLUCOSE BLOOD TEST: CPT

## 2022-11-04 PROCEDURE — 25010000002 FONDAPARINUX PER 0.5 MG: Performed by: FAMILY MEDICINE

## 2022-11-04 RX ADMIN — INSULIN ASPART 6 UNITS: 100 INJECTION, SOLUTION INTRAVENOUS; SUBCUTANEOUS at 17:11

## 2022-11-04 RX ADMIN — PREGABALIN 100 MG: 100 CAPSULE ORAL at 08:51

## 2022-11-04 RX ADMIN — PANTOPRAZOLE SODIUM 40 MG: 40 TABLET, DELAYED RELEASE ORAL at 05:37

## 2022-11-04 RX ADMIN — DOCUSATE SODIUM 100 MG: 100 CAPSULE ORAL at 21:42

## 2022-11-04 RX ADMIN — ASPIRIN 81 MG: 81 TABLET, COATED ORAL at 08:51

## 2022-11-04 RX ADMIN — HYDROCODONE BITARTRATE AND ACETAMINOPHEN 1 TABLET: 5; 325 TABLET ORAL at 07:55

## 2022-11-04 RX ADMIN — Medication 1 TABLET: at 08:51

## 2022-11-04 RX ADMIN — INSULIN DETEMIR 20 UNITS: 100 INJECTION, SOLUTION SUBCUTANEOUS at 21:42

## 2022-11-04 RX ADMIN — INSULIN DETEMIR 20 UNITS: 100 INJECTION, SOLUTION SUBCUTANEOUS at 09:00

## 2022-11-04 RX ADMIN — LEVOTHYROXINE SODIUM 25 MCG: 0.07 TABLET ORAL at 05:37

## 2022-11-04 RX ADMIN — INSULIN ASPART 2 UNITS: 100 INJECTION, SOLUTION INTRAVENOUS; SUBCUTANEOUS at 11:20

## 2022-11-04 RX ADMIN — HYDROCODONE BITARTRATE AND ACETAMINOPHEN 1 TABLET: 5; 325 TABLET ORAL at 21:41

## 2022-11-04 RX ADMIN — Medication 150 MG: at 08:51

## 2022-11-04 RX ADMIN — PREGABALIN 100 MG: 100 CAPSULE ORAL at 21:42

## 2022-11-04 RX ADMIN — FONDAPARINUX SODIUM 2.5 MG: 2.5 INJECTION, SOLUTION SUBCUTANEOUS at 08:52

## 2022-11-04 RX ADMIN — OXYCODONE HYDROCHLORIDE AND ACETAMINOPHEN 500 MG: 500 TABLET ORAL at 08:51

## 2022-11-04 RX ADMIN — CARBIDOPA AND LEVODOPA 10 MG: 50; 200 TABLET, EXTENDED RELEASE ORAL at 07:55

## 2022-11-04 RX ADMIN — DAPTOMYCIN 500 MG: 500 INJECTION, POWDER, LYOPHILIZED, FOR SOLUTION INTRAVENOUS at 18:36

## 2022-11-04 RX ADMIN — CARBIDOPA AND LEVODOPA 10 MG: 50; 200 TABLET, EXTENDED RELEASE ORAL at 17:12

## 2022-11-04 RX ADMIN — CARBIDOPA AND LEVODOPA 10 MG: 50; 200 TABLET, EXTENDED RELEASE ORAL at 11:20

## 2022-11-04 RX ADMIN — DOCUSATE SODIUM 100 MG: 100 CAPSULE ORAL at 08:51

## 2022-11-04 NOTE — THERAPY TREATMENT NOTE
Inpatient Rehabilitation - Occupational Therapy Treatment Note    YVONNE Gaines     Patient Name: Melchor Hardwick III  : 1965  MRN: [id number]    Today's Date: 2022                 Admit Date: 10/28/2022         ICD-10-CM ICD-9-CM   1. Staphylococcal arthritis of right knee (HCC)  M00.061 711.06     041.10       Patient Active Problem List   Diagnosis   • Hyperlipidemia   • Hypertensive disorder   • Obesity   • Type 2 diabetes mellitus with hyperglycemia, with long-term current use of insulin (HCC)   • Gas gangrene of foot (HCC)   • Cellulitis in diabetic foot (HCC)   • Other acute osteomyelitis of left foot (HCC)   • Osteomyelitis (HCC)   • Critical illness myopathy   • Staphylococcal arthritis of right knee (HCC)   • Other cirrhosis of liver (HCC)   • MRSA bacteremia   • Endocarditis of tricuspid valve   • Wound of right buttock   • History of osteomyelitis   • Debility       Past Medical History:   Diagnosis Date   • Diabetes mellitus (HCC)    • Hyperlipidemia    • Hypertension    • Pyogenic arthritis of right knee joint, due to unspecified organism (HCC) 2022       Past Surgical History:   Procedure Laterality Date   • ABSCESS DRAINAGE      PERINEUM   • AMPUTATION FOOT Left 2022    Procedure: AMPUTATION FOOT;  Surgeon: Addison Colby MD;  Location: [facility] OR;  Service: Podiatry;  Laterality: Left;   • INCISION AND DRAINAGE LEG Left 05/10/2022    Procedure: INCISION AND DRAINAGE LOWER EXTREMITY;  Surgeon: Addison Colby MD;  Location: [facility] OR;  Service: Podiatry;  Laterality: Left;   • KNEE INCISION AND DRAINAGE Right 2022    Procedure: KNEE INCISION AND DRAINAGE RIGHT;  Surgeon: Brain Bentley MD;  Location: [facility] OR;  Service: Orthopedics;  Laterality: Right;   • WOUND DEBRIDEMENT Left 2022    Procedure: DEBRIDEMENT FOOT;  Surgeon: Addison Colby MD;  Location: [facility] OR;  Service: Podiatry;  Laterality: Left;             IRF OT ASSESSMENT FLOWSHEET (last 12 hours)      IRF OT Evaluation and Treatment     Row Name 11/04/22 1142          OT Time and Intention    Document Type daily treatment  -HB     Mode of Treatment individual therapy;occupational therapy  -HB     Total Minutes, Occupational Therapy 90  -HB     Patient Effort good  -HB     Symptoms Noted During/After Treatment none  -HB     Row Name 11/04/22 1142          General Information    Patient Profile Reviewed yes  -HB     Patient/Family/Caregiver Comments/Observations Patient and RN [email or web address] OT this date.  -HB     General Observations of Patient Patient tolerated OT well with no adverse reactions.  -HB     Existing Precautions/Restrictions fall;brace worn when out of bed  LLe  -HB     Row Name 11/04/22 1142          Pain Assessment    Pretreatment Pain Rating 4/10  -HB     Posttreatment Pain Rating 4/10  -HB     Pain Location - back  -HB     Row Name 11/04/22 1142          Cognition/Psychosocial    Affect/Mental Status (Cognition) WFL  -HB     Orientation Status (Cognition) oriented x 4  -HB     Follows Commands (Cognition) WFL  -HB     Row Name 11/04/22 1142          Upper Body Dressing    [place] Level (Upper Body Dressing) upper body dressing skills;set up assistance  -HB     Position (Upper Body Dressing) sitting up in bed  -HB     Row Name 11/04/22 1142          Lower Body Dressing    [place] Level (Lower Body Dressing) moderate assist (50% patient effort);verbal cues;nonverbal cues (demo/gesture)  -HB     Position (Lower Body Dressing) edge of bed sitting  -HB     Row Name 11/04/22 1142          Bed Mobility    Supine-Sit [place] (Bed Mobility) modified independence  -HB     Sit-Supine [place] (Bed Mobility) modified independence  -     Row Name 11/04/22 1142          Transfer Assessment/Treatment    Transfers chair-bed transfer  -     Row Name 11/04/22 1142          Bed-Chair Transfer    Bed-Chair [place] (Transfers) minimum assist (75% patient effort);nonverbal cues (demo/gesture);verbal  cues;contact guard  -     Assistive Device (Bed-Chair Transfers) walker, front-wheeled;wheelchair  -     Row Name 11/04/22 1142          Chair-Bed Transfer    Chair-Bed [place] (Transfers) minimum assist (75% patient effort);verbal cues;nonverbal cues (demo/gesture)  -     Assistive Device (Chair-Bed Transfers) wheelchair;walker, front-wheeled  -     Row Name 11/04/22 1142          Sit-Stand Transfer    Sit-Stand [place] (Transfers) nonverbal cues (demo/gesture);verbal cues;minimum assist (75% patient effort);contact guard  -     Row Name 11/04/22 1142          Stand-Sit Transfer    Stand-Sit [place] (Transfers) contact guard;verbal cues;nonverbal cues (demo/gesture)  -     Row Name 11/04/22 1142          Motor Skills    Motor Skills coordination;functional endurance;motor control/coordination interventions  -     Motor Control/Coordination Interventions fine motor manipulation/dexterity activities;therapeutic exercise/ROM;gross motor coordination activities  -     Therapeutic Exercise shoulder;elbow/forearm;wrist;hand  PRE 3x 3 min; rickshaw 10 lbs 3 sets of 20 reps; BUE TA to increase ADL status/endurance; rest between tasks.  -     Row Name 11/04/22 1142          Positioning and Restraints    Pre-Treatment Position in bed  -     Post Treatment Position bed  -HB     In Bed encouraged to call for assist;call light within reach  second session pt in bed and left with   -           User Key  (r) = Recorded By, (t) = Taken By, (c) = Cosigned By    Initials Name Effective Dates     Dorothy Rosas, PAYAM 05/25/21 -                  Occupational Therapy Education     Title: PT OT SLP Therapies (Done)     Topic: Occupational Therapy (Done)     Point: ADL training (Done)     Description:   Instruct learner(s) on proper safety adaptation and remediation techniques during self care or transfers.   Instruct in proper use of assistive devices.              Learning Progress  Summary           Patient Acceptance, E, VU,NR by HB at 11/4/2022 1153    Acceptance, E, VU,NR by HB at 11/3/2022 1428    Acceptance, E,TB, VU by DG at 11/1/2022 2016    Acceptance, E,TB, VU by DG at 10/31/2022 2315    Acceptance, E, VU,NR by BF at 10/31/2022 1429    Acceptance, E,TB, VU by DL at 10/31/2022 0101    Acceptance, E,TB, VU by DL at 10/29/2022 2321    Acceptance, E, VU,NR by HB at 10/29/2022 1137                   Point: Home exercise program (Done)     Description:   Instruct learner(s) on appropriate technique for monitoring, assisting and/or progressing therapeutic exercises/activities.              Learning Progress Summary           Patient Acceptance, E, VU,NR by HB at 11/4/2022 1153    Acceptance, E, VU,NR by HB at 11/3/2022 1428    Acceptance, E,TB, VU by DG at 11/1/2022 2016    Acceptance, E,TB, VU by DG at 10/31/2022 2315    Acceptance, E,TB, VU by DL at 10/31/2022 0101    Acceptance, E,TB, VU by DL at 10/29/2022 2321    Acceptance, E, VU,NR by HB at 10/29/2022 1137                   Point: Precautions (Done)     Description:   Instruct learner(s) on prescribed precautions during self-care and functional transfers.              Learning Progress Summary           Patient Acceptance, E, VU,NR by HB at 11/4/2022 1153    Acceptance, E, VU,NR by HB at 11/3/2022 1428    Acceptance, E,TB, VU by DG at 11/1/2022 2016    Acceptance, E,TB, VU by DG at 10/31/2022 2315    Acceptance, E, VU,NR by BF at 10/31/2022 1429    Acceptance, E,TB, VU by DL at 10/31/2022 0101    Acceptance, E,TB, VU by DL at 10/29/2022 2321    Acceptance, E, VU,NR by HB at 10/29/2022 1137                   Point: Body mechanics (Done)     Description:   Instruct learner(s) on proper positioning and spine alignment during self-care, functional mobility activities and/or exercises.              Learning Progress Summary           Patient Acceptance, E, VU,NR by HB at 11/4/2022 1153    Acceptance, E, VU,NR by HB at 11/3/2022 2882     Acceptance, E,TB, VU by DG at 11/1/2022 2016    Acceptance, E,TB, VU by DG at 10/31/2022 2315    Acceptance, E,TB, VU by DL at 10/31/2022 0101    Acceptance, E,TB, VU by DL at 10/29/2022 2321    Acceptance, E, VU,NR by HB at 10/29/2022 1137                               User Key     Initials Effective Dates Name Provider Type Discipline    DG 06/16/21 -  Phyllis Yañez, RN Registered Nurse Nurse     06/16/21 -  Mandi Smith OT Occupational Therapist OT    HB 05/25/21 -  Dorothy Rosas OT Occupational Therapist OT    DL 03/16/22 -  Hellen Mcdonnell, RN Registered Nurse Nurse                    OT Recommendation and Plan    Planned Therapy Interventions (OT): activity tolerance training, adaptive equipment training, BADL retraining, IADL retraining, patient/caregiver education/training, strengthening exercise, transfer/mobility retraining                    Time Calculation:      Time Calculation- OT     Row Name 11/04/22 1153 11/04/22 1139          Time Calculation- OT    OT Start Time 1000  -HB 0745  -HB     OT Stop Time 1045  -HB 0830  -HB     OT Time Calculation (min) 45 min  -HB 45 min  -HB     Total Timed Code Minutes- OT 45 minute(s)  -HB 45 minute(s)  -HB     OT Non-Billable Time (min) -- 10 min  -HB           User Key  (r) = Recorded By, (t) = Taken By, (c) = Cosigned By    Initials Name Provider Type     Dorothy Rosas OT Occupational Therapist              Therapy Charges for Today     Code Description Service Date Service Provider Modifiers Qty    11900580264 HC OT SELF CARE/MGMT/TRAIN EA 15 MIN 11/3/2022 Dorothy Rosas OT GO 2    49024553636 HC OT THER PROC EA 15 MIN 11/3/2022 Dorothy Rosas OT GO 1    54466461884 HC OT THERAPEUTIC ACT EA 15 MIN 11/3/2022 Dorothy Rosas OT GO 3    70695477256 HC OT SELF CARE/MGMT/TRAIN EA 15 MIN 11/4/2022 Dorothy Rosas OT GO 2    11789665407 HC OT THER PROC EA 15 MIN 11/4/2022 Dorothy Rosas OT GO 2    [id number]  OT THERAPEUTIC ACT EA 15 MIN 11/4/2022  Ralph, Dorothy, OT GO 2                   Dorothy Rosas, OT  11/4/2022

## 2022-11-04 NOTE — PROGRESS NOTES
Occupational Therapy:    Physical Therapy: Individual: 90 minutes.    Speech Language Pathology:    Signed by: Meron Lei PTA

## 2022-11-04 NOTE — PROGRESS NOTES
PROGRESS NOTE         Patient Identification:  Name:  Melchor Hardwick III  Age:  57 y.o.  Sex:  male  :  1965  MRN:  [id number]  Visit Number:  [id number]  Primary Care Provider:  Missy Up APRN         LOS: 7 days       ----------------------------------------------------------------------------------------------------------------------  Subjective       Chief Complaints:    No chief complaint on file.        Interval History:      The patient is sitting up in bed on room air in no apparent distress.  Denies any new complaints at this time.  Continues to work with occupational and physical therapy.  Yesterday, Dr. Gibson reached out regarding recent TYRESE and he reported possible concern for endocarditis and recommended treating as such.  He plans to repeat TYRESE in a few months.  Discussed this with the patient and he is very understanding and agreeable.  Afebrile, no diarrhea.  No new labs today.    Review of Systems:    Constitutional: no fever, chills and night sweats.  Generalized fatigue.  Eyes: no eye drainage, itching or redness.  HEENT: no mouth sores, dysphagia or nose bleed.  Respiratory: no for shortness of breath, cough or production of sputum.  Cardiovascular: no chest pain, no palpitations, no orthopnea.  Gastrointestinal: no nausea, vomiting or diarrhea. No abdominal pain, hematemesis or rectal bleeding.  Genitourinary: no dysuria or polyuria.  Hematologic/lymphatic: no lymph node abnormalities, no easy bruising or easy bleeding.  Musculoskeletal: no muscle or joint pain.  Skin: No rash and no itching.  Neurological: no loss of consciousness, no seizure, no headache.  Behavioral/Psych: no depression or suicidal ideation.  Endocrine: no hot flashes.  Immunologic: negative.    ----------------------------------------------------------------------------------------------------------------------      Objective       Current [facility] Meds:  ascorbic acid, 500 mg, Oral,  Daily  aspirin, 81 mg, Oral, Daily  DAPTOmycin, 6 mg/kg (Adjusted), Intravenous, Q24H  docusate sodium, 100 mg, Oral, BID  fondaparinux, 2.5 mg, Subcutaneous, Q24H  Insulin Aspart, 0-9 Units, Subcutaneous, TID AC  insulin detemir, 20 Units, Subcutaneous, Q12H  iron polysaccharides, 150 mg, Oral, Daily  levothyroxine, 25 mcg, Oral, Q AM  midodrine, 10 mg, Oral, TID AC  multivitamin with minerals, 1 tablet, Oral, Daily  pantoprazole, 40 mg, Oral, Q AM  pregabalin, 100 mg, Oral, Q12H      Pharmacy Consult,       ----------------------------------------------------------------------------------------------------------------------    Vital Signs:  Temp:  [97.5 °F (36.4 °C)-98 °F (36.7 °C)] 97.5 °F (36.4 °C)  Heart Rate:  [82-88] 88  Resp:  [18-20] 20  BP: (102-123)/(60-72) 123/72  No data found.  SpO2 Percentage    11/03/22 0742 11/03/22 1900 11/04/22 0800   SpO2: 100% 95% 100%     SpO2:  [95 %-100 %] 100 %  on   ;   Device (Oxygen Therapy): room air    Body mass index is 28.34 kg/m².  Wt Readings from Last 3 Encounters:   11/01/22 89.6 kg (197 lb 8.5 oz)   09/23/22 98 kg (216 lb)   06/17/22 115 kg (254 lb)        Intake/Output Summary (Last 24 hours) at 11/4/2022 1117  Last data filed at 11/4/2022 0800  Gross per 24 hour   Intake 1680 ml   Output 1525 ml   Net 155 ml     Diet Regular; Thin; Consistent Carbohydrate  ----------------------------------------------------------------------------------------------------------------------      Physical Exam:    Constitutional: Elderly, chronically ill-appearing male is sitting up in bed on room air in no apparent distress.  Appears very comfortable.  HENT:  Head: Normocephalic and atraumatic.  Mouth:  Moist mucous membranes.    Eyes:  Conjunctivae and EOM are normal.  No scleral icterus.  Neck:  Neck supple.  No JVD present.    Cardiovascular:  Normal rate, regular rhythm and normal heart sounds with no murmur. No edema.  Pulmonary/Chest:  No respiratory distress, no wheezes,  no crackles, with normal breath sounds and good air movement.  Abdominal:  Soft.  Bowel sounds are normal.  No distension and no tenderness.   Musculoskeletal:  No edema, no tenderness, and no deformity.  No swelling or redness of joints.  Neurological:  Alert and oriented to person, place, and time.  No facial droop.  No slurred speech.   Skin:  Skin is warm and dry.  No rash noted.  No pallor.  Right knee surgical incision with good healing.  No surrounding erythema, edema, warmth, or drainage.  Psychiatric:  Normal mood and affect.  Behavior is normal.    ----------------------------------------------------------------------------------------------------------------------  Results from last 7 days   Lab Units 10/29/22  0112   CK TOTAL U/L 34           Results from last 7 days   Lab Units 10/31/22  0148 10/30/22  0109 10/29/22  0112   CRP mg/dL  --   --  3.73*   WBC 10*3/mm3 3.12* 3.85 2.98*   HEMOGLOBIN g/dL 8.8* 8.2* 8.0*   HEMATOCRIT % 28.8* 26.9* 26.1*   MCV fL 84.7 85.1 84.5   MCHC g/dL 30.6* 30.5* 30.7*   PLATELETS 10*3/mm3 102* 96* 90*     Results from last 7 days   Lab Units 10/31/22  0148 10/30/22  0109 10/29/22  0112   SODIUM mmol/L 138 136 133*   POTASSIUM mmol/L 4.1 3.9 3.8   MAGNESIUM mg/dL  --  2.1 1.8   CHLORIDE mmol/L 103 102 101   CO2 mmol/L 26.6 26.1 25.3   BUN mg/dL 9 11 10   CREATININE mg/dL 0.61* 0.55* 0.63*   CALCIUM mg/dL 8.0* 8.1* 8.2*   GLUCOSE mg/dL 154* 211* 152*   ALBUMIN g/dL  --   --  2.13*   BILIRUBIN mg/dL  --   --  0.4   ALK PHOS U/L  --   --  289*   AST (SGOT) U/L  --   --  30   ALT (SGPT) U/L  --   --  26   Estimated Creatinine Clearance: 150.4 mL/min (A) (by C-G formula based on SCr of 0.61 mg/dL (L)).  No results found for: AMMONIA    Glucose   Date/Time Value Ref Range Status   11/04/2022 1106 188 (H) 70 - 130 mg/dL Final     Comment:     Meter: [id number] : [id number] NADJA RANGEL   11/04/2022 0553 149 (H) 70 - 130 mg/dL Final     Comment:     Meter: [id number]  : [id number] Goldman Rosie   11/03/2022 1947 302 (H) 70 - 130 mg/dL Final     Comment:     Meter: [id number] : [id number] Tracey Crystal   11/03/2022 1626 249 (H) 70 - 130 mg/dL Final     Comment:     Meter: [id number] : [id number] NADJA JUAN CARLOS   11/03/2022 1111 202 (H) 70 - 130 mg/dL Final     Comment:     Meter: [id number] : [id number] Penelope Eunice   11/03/2022 0600 87 70 - 130 mg/dL Final     Comment:     Meter: [id number] : [id number] Goldman Rosie   11/02/2022 1945 237 (H) 70 - 130 mg/dL Final     Comment:     Meter: [id number] : [id number] Lannon Crystal   11/02/2022 1552 362 (H) 70 - 130 mg/dL Final     Comment:     Meter: [id number] : [id number] mark vasilebenita     Lab Results   Component Value Date    HGBA1C 8.80 (H) 09/20/2022     Lab Results   Component Value Date    TSH 2.390 10/10/2022    FREET4 0.87 (L) 10/10/2022       Blood Culture   Date Value Ref Range Status   10/24/2022 No growth at 5 days  Final   10/24/2022 No growth at 5 days  Final     No results found for: URINECX  No results found for: WOUNDCX  No results found for: STOOLCX  No results found for: RESPCX  Pain Management Panel       Pain Management Panel Latest Ref Rng & Units 5/24/2022 5/11/2022    CREATININE UR mg/dL 91.0 -    AMPHETAMINES SCREEN, URINE Negative - Negative    BARBITURATES SCREEN Negative - Negative    BENZODIAZEPINE SCREEN, URINE Negative - Negative    BUPRENORPHINEUR Negative - Negative    COCAINE SCREEN, URINE Negative - Negative    METHADONE SCREEN, URINE Negative - Negative    METHAMPHETAMINEUR Negative - Negative              ----------------------------------------------------------------------------------------------------------------------  Imaging Results (Last 24 Hours)       ** No results found for the last 24 hours. **            ----------------------------------------------------------------------------------------------------------------------    Pertinent Infectious Disease  Results    CK from 10/29/2022 normal at 34.  COVID-19 PCR from 10/28/2022 negative.  Blood cultures from 10/18/2022 2 out of 2 sets positive for MRSA.  Blood cultures from 10/20/2022 2 out of 2 sets positive for MRSA.  Blood cultures from 10/22/2022 1 out of 2 sets positive for MRSA.  Blood cultures from 10/24/2022 finalized as no growth.  2D echo from 10/24/2022 reports no evidence of vegetation.  Updated TYRESE on 11/1/2022 showed a moderate sized echodense structure on the posterior tricuspid leaflet but not attached to the leaflet.  The appearance and location are not typical for regurgitation.  This structure could be a normal variant.  No evidence of tricuspid valve stenosis or significant regurgitations.  Other valves also appear normal in structure with no evidence of stenosis or significant regurgitations.  No vegetation seen on these valves.  Recommendation was made to extend antibiotic therapy for possible infective endocarditis and repeat TYRESE in 3 months.  Assessment/Plan       Assessment     Right knee septic arthritis  MRSA bacteremia  Rule out TV endocarditis      Plan      I examined the patient this morning and he is sitting up in bed on room air in no apparent distress.  Seems very comfortable.  Denies any new complaints at this time.  Yesterday, Dr. Gibson reached out regarding the patient's recent TYRESE.  He reported possible concern for endocarditis and recommended treating as such.  I discussed this with the patient and he is very understanding and agreeable for an extended course of antibiotic therapy.  Patient will follow-up in February for repeat TYRESE.  Remains afebrile without diarrhea.  Lungs are clear to auscultation bilaterally.  No murmur.  Abdomen is soft, nontender, with normal bowel sounds.  Right knee is still healing very well without erythema, edema, or warmth.  No new labs today.    Recommend to continue on daptomycin, but patient will require 6 weeks of IV antibiotic therapy rather  than 2 weeks based on my discussion with Dr. Gibson.  Patient will require IV antibiotic therapy through 12/5/2022.    Code Status:   Code Status and Medical Interventions:   Ordered at: 11/03/22 1015     Level Of Support Discussed With:    Patient     Code Status (Patient has no pulse and is not breathing):    CPR (Attempt to Resuscitate)     Medical Interventions (Patient has pulse or is breathing):    Full Support     Release to patient:    Routine Release       Sara Sales PA-C  11/04/22  11:17 EDT

## 2022-11-04 NOTE — THERAPY PROGRESS REPORT/RE-CERT
Inpatient Rehabilitation - Physical Therapy Progress Note        [place]     Patient Name: Melchor Hardwick III  : 1965  MRN: [id number]    Today's Date: 2022                    Admit Date: 10/28/2022      Visit Dx:     ICD-10-CM ICD-9-CM   1. Staphylococcal arthritis of right knee (HCC)  M00.061 711.06     041.10       Patient Active Problem List   Diagnosis   • Hyperlipidemia   • Hypertensive disorder   • Obesity   • Type 2 diabetes mellitus with hyperglycemia, with long-term current use of insulin (HCC)   • Gas gangrene of foot (HCC)   • Cellulitis in diabetic foot (HCC)   • Other acute osteomyelitis of left foot (HCC)   • Osteomyelitis (HCC)   • Critical illness myopathy   • Staphylococcal arthritis of right knee (HCC)   • Other cirrhosis of liver (HCC)   • MRSA bacteremia   • Endocarditis of tricuspid valve   • Wound of right buttock   • History of osteomyelitis   • Debility       Past Medical History:   Diagnosis Date   • Diabetes mellitus (HCC)    • Hyperlipidemia    • Hypertension    • Pyogenic arthritis of right knee joint, due to unspecified organism (HCC) 2022       Past Surgical History:   Procedure Laterality Date   • ABSCESS DRAINAGE      PERINEUM   • AMPUTATION FOOT Left 2022    Procedure: AMPUTATION FOOT;  Surgeon: Addison Colby MD;  Location: [facility] OR;  Service: Podiatry;  Laterality: Left;   • INCISION AND DRAINAGE LEG Left 05/10/2022    Procedure: INCISION AND DRAINAGE LOWER EXTREMITY;  Surgeon: Addison Colby MD;  Location:  [facility] OR;  Service: Podiatry;  Laterality: Left;   • KNEE INCISION AND DRAINAGE Right 2022    Procedure: KNEE INCISION AND DRAINAGE RIGHT;  Surgeon: Brain Bentley MD;  Location: [facility]placed [facility];  Service: Orthopedics;  Laterality: Right;   • WOUND DEBRIDEMENT Left 2022    Procedure: DEBRIDEMENT FOOT;  Surgeon: Addison Colby MD;  Location: [facility] OR;  Service: Podiatry;  Laterality: Left;       PT ASSESSMENT (last 12 hours)     IRF PT  Evaluation and Treatment     Row Name 11/04/22 1544          PT Time and Intention    Document Type progress note;daily treatment  -RF     Mode of Treatment physical therapy  -RF     Patient/Family/Caregiver Comments/Observations Pt reports R knee and low back pain BID.  -RF     Row Name 11/04/22 1544          General Information    Patient Profile Reviewed yes  -RF     Existing Precautions/Restrictions fall;brace worn when out of bed  aircast L LE  -RF     Limitations/Impairments other (see comments)  ortho hypo  -RF     Row Name 11/04/22 1544          Pain Assessment    Pretreatment Pain Rating 6/10  knee, low back  -RF     Posttreatment Pain Rating 8/10  -RF     Row Name 11/04/22 1544          Cognition/Psychosocial    Orientation Status (Cognition) oriented x 4  -RF     Follows Commands (Cognition) WFL  -RF     Row Name 11/04/22 1544          Mobility    Extremity Weight-bearing Status left lower extremity;right lower extremity  -RF     Left Lower Extremity (Weight-bearing Status) weight-bearing as tolerated (WBAT);other (see comments)  with brace/air cast  -RF     Right Lower Extremity (Weight-bearing Status) weight-bearing as tolerated (WBAT);other (see comments)  -RF     Row Name 11/04/22 1544          Bed Mobility    Bed Mobility supine-sit;sit-supine;scooting/bridging;rolling left;rolling right  -RF     Rolling Left [place] (Bed Mobility) modified independence  -RF     Rolling Right [place] (Bed Mobility) modified independence  -RF     Scooting/Bridging [place] (Bed Mobility) modified independence  -RF     Supine-Sit [place] (Bed Mobility) modified independence  -RF     Sit-Supine [place] (Bed Mobility) set up  -RF     Assistive Device (Bed Mobility) bed rails  -RF     Row Name 11/04/22 1544          Chair-Bed Transfer    Chair-Bed [place] (Transfers) minimum assist (75% patient effort);contact guard  -RF     Assistive Device (Chair-Bed Transfers) wheelchair;walker,  front-wheeled  -RF     Row Name 11/04/22 1544          Sit-Stand Transfer    Sit-Stand [place] (Transfers) nonverbal cues (demo/gesture);verbal cues;contact guard  -RF     Assistive Device (Sit-Stand Transfers) walker, front-wheeled  -RF     Row Name 11/04/22 1544          Stand-Sit Transfer    Stand-Sit [place] (Transfers) contact guard;verbal cues;nonverbal cues (demo/gesture)  -RF     Assistive Device (Stand-Sit Transfers) wheelchair;walker, front-wheeled  -RF     Row Name 11/04/22 1544          Gait/Stairs (Locomotion)    [place] Level (Gait) minimum assist (75% patient effort);contact guard;nonverbal cues (demo/gesture);verbal cues  -RF     Assistive Device (Gait) parallel bars;walker, front-wheeled  -RF     Distance in Feet (Gait) 6'X2 parallel bars, 20 with RW in hallway  -RF     Right Sided Gait Deviations heel strike decreased;weight shift ability decreased  Decreased R knee extension  -RF     Comment, (Gait/Stairs) Decreased WB noted bilaterally; pt heavily reliant on UEs at RW. Fair quality noted otherwise.  -RF     Row Name 11/04/22 1544          Hip (Therapeutic Exercise)    Hip Strengthening (Therapeutic Exercise) bilateral;flexion;extension;aBduction;aDduction;heel slides;marching while seated;sitting;resistance band;blue;2 sets;10 repetitions  -RF     Row Name 11/04/22 1544          Knee (Therapeutic Exercise)    Knee (Therapeutic Exercise) PROM (passive range of motion)  -RF     Knee AAROM (Therapeutic Exercise) right;extension;sitting;2 sets;10 repetitions;other (see comments)  Pt performed  seated HS with SB; OP applied at end range. Pt also performed QS with OP applied at end range.  -RF     Knee PROM (Therapeutic Exercise) right;extension;sitting;5 second hold;10 repetitions  -RF     Knee Strengthening (Therapeutic Exercise) bilateral;flexion;extension;heel slides;marching while seated;LAQ (long arc quad);hamstring curls;sitting;resistance band;blue;2 sets;10 repetitions  -RF      Row Name 11/04/22 1544          Positioning and Restraints    Pre-Treatment Position sitting in chair/recliner  BID  -RF     In Bed supine;call light within reach;encouraged to call for assist  PM  -RF     In Wheelchair sitting;call light within reach;encouraged to call for assist  AM  -RF     Row Name 11/04/22 1544          Vital Signs    Pre Systolic BP Rehab 111  -RF     Pre Treatment Diastolic BP 64  -RF     Intra Systolic BP Rehab 87  after standing in AM  -RF     Intra Treatment Diastolic BP 53  -RF     Row Name 11/04/22 1544          Therapy Assessment/Plan (PT)    Rehab Potential/Prognosis (PT) good, to achieve stated therapy goals  -RF     Frequency of Treatment (PT) 5 times per week;90 minutes per session  -RF     Estimated Duration of Therapy (PT) 2 weeks;3 weeks  -RF     Row Name 11/04/22 1544          Daily Progress Summary (PT)    Functional Goal Overall Progress (PT) progressing toward functional goals as expected  -RF     Daily Progress Summary (PT) Improvements noted in functional mobility and ambulation distance this date. Pt hindered by low back pain in PM. R knee ROM deficits continually noted. Pt requires continued skilled care for further LE strengthening and improved knee ROM to ensure maximum safe function upon D/C.  -RF     Impairments Still Limiting Function (PT) flexibility decreased;functional activity tolerance impairment;pain;range of motion deficit;strength deficit  -RF     Recommendations (PT) Continue per current POC  -RF     Row Name 11/04/22 1544          Therapy Plan Review/Discharge Plan (PT)    Anticipated Discharge Disposition (PT) home with assist;home with supervision;home with home health  -RF     Row Name 11/04/22 1544          IRF PT Goals    Bed Mobility Goal Selection (PT-IRF) bed mobility, PT goal 1  -RF     Gait (Walking Locomotion) Goal Selection (PT-IRF) gait, PT goal 1  -RF     Row Name 11/04/22 1544          Bed Mobility Goal 1 (PT-IRF)    Activity/Assistive Device  (Bed Mobility Goal 1, PT-IRF) bed mobility activities, all  -RF     Sod Level (Bed Mobility Goal 1, PT-IRF) independent  -RF     Time Frame (Bed Mobility Goal 1, PT-IRF) long-term goal (LTG)  -RF     Progress/Outcomes (Bed Mobility Goal 1, PT-IRF) goal met  -RF     Row Name 11/04/22 1544          Gait/Walking Locomotion Goal 1 (PT-IRF)    Activity/Assistive Device (Gait/Walking Locomotion Goal 1, PT-IRF) gait (walking locomotion);assistive device use  -RF     Gait/Walking Locomotion Distance Goal 1 (PT-IRF) 30'  -RF     Sod Level (Gait/Walking Locomotion Goal 1, PT-IRF) standby assist;modified independence  -RF     Time Frame (Gait/Walking Locomotion Goal 1, PT-IRF) long-term goal (LTG)  -RF     Progress/Outcomes (Gait/Walking Locomotion Goal 1, PT-IRF) goal ongoing  -RF           User Key  (r) = Recorded By, (t) = Taken By, (c) = Cosigned By    Initials Name Provider Type    RF Meron Lei PTA Physical Therapist Assistant              Wound 09/21/22 1532 Right anterior knee Incision (Active)   Dressing Appearance open to air 11/04/22 0851   Closure Approximated 11/04/22 0851   Base dry;clean 11/04/22 0851   Drainage Amount none 11/04/22 0851   Care, Wound cleansed with 11/04/22 0851   Dressing Care open to air 11/04/22 0851       Wound 10/28/22 1827 Right coccyx Pressure Injury (Active)   Pressure Injury Stage 2 11/04/22 0851   Dressing Appearance open to air 11/04/22 0851   Closure Open to air 11/04/22 0851   Base red;non-blanchable 11/04/22 0851   Drainage Amount none 11/04/22 0851   Care, Wound barrier applied;pressure removed 11/04/22 0851   Dressing Care open to air 11/04/22 0851     Physical Therapy Education     Title: PT OT SLP Therapies (Done)     Topic: Physical Therapy (Done)     Point: Mobility training (Done)     Learning Progress Summary           Patient Acceptance, E,TB, VU by RF at 11/4/2022 1551    Acceptance, E,TB, VU,DU by HR at 11/3/2022 1541    Acceptance, E,D, VU,NR  by RG at 11/3/2022 1442    Acceptance, E,TB, VU by DG at 11/1/2022 2016    Acceptance, E,D, VU,NR by RG at 11/1/2022 1541    Acceptance, E,TB, VU by DG at 10/31/2022 2315    Acceptance, E,D, VU,NR by RG at 10/31/2022 1446                   Point: Home exercise program (Done)     Learning Progress Summary           Patient Acceptance, E,TB, VU by RF at 11/4/2022 1551    Acceptance, E,TB, VU,DU by HR at 11/3/2022 1541    Acceptance, E,D, VU,NR by RG at 11/3/2022 1442    Acceptance, E,TB, VU by DG at 11/1/2022 2016    Acceptance, E,D, VU,NR by RG at 11/1/2022 1541    Acceptance, E,TB, VU by DG at 10/31/2022 2315    Acceptance, E,D, VU,NR by RG at 10/31/2022 1446                   Point: Body mechanics (Done)     Learning Progress Summary           Patient Acceptance, E,TB, VU by RF at 11/4/2022 1551    Acceptance, E,TB, VU,DU by HR at 11/3/2022 1541    Acceptance, E,D, VU,NR by RG at 11/3/2022 1442    Acceptance, E,TB, VU by DG at 11/1/2022 2016    Acceptance, E,D, VU,NR by RG at 11/1/2022 1541    Acceptance, E,TB, VU by DG at 10/31/2022 2315    Acceptance, E,D, VU,NR by RG at 10/31/2022 1446                   Point: Precautions (Done)     Learning Progress Summary           Patient Acceptance, E,TB, VU by RF at 11/4/2022 1551    Acceptance, E,TB, VU,DU by HR at 11/3/2022 1541    Acceptance, E,D, VU,NR by RG at 11/3/2022 1442    Acceptance, E,TB, VU by DG at 11/1/2022 2016    Acceptance, E,D, VU,NR by RG at 11/1/2022 1541    Acceptance, E,TB, VU by DG at 10/31/2022 2315    Acceptance, E,D, VU,NR by RG at 10/31/2022 1446                               User Key     Initials Effective Dates Name Provider Type Discipline    DG 06/16/21 -  Phyllis Yañez, RN Registered Nurse Nurse    RF 06/16/21 -  Meron Lei PTA Physical Therapist Assistant PT    RG 06/16/21 -  Michi Mckeon PTA Physical Therapist Assistant PT    HR 01/14/22 -  Hanna Murphy PTA Physical Therapist Assistant PT                PT Recommendation and  Plan    Frequency of Treatment (PT): 5 times per week, 90 minutes per session     Daily Progress Summary (PT)  Functional Goal Overall Progress (PT): progressing toward functional goals as expected  Daily Progress Summary (PT): Improvements noted in functional mobility and ambulation distance this date. Pt hindered by low back pain in PM. R knee ROM deficits continually noted. Pt requires continued skilled care for further LE strengthening and improved knee ROM to ensure maximum safe function upon D/C.  Impairments Still Limiting Function (PT): flexibility decreased, functional activity tolerance impairment, pain, range of motion deficit, strength deficit  Recommendations (PT): Continue per current POC               Time Calculation:      PT Charges     Row Name 11/04/22 1552 11/04/22 1551          Time Calculation    Start Time 1245  -RF 1045  -RF     Stop Time 1330  -RF 1130  -RF     Time Calculation (min) 45 min  -RF 45 min  -RF     PT Received On 11/04/22  -RF 11/04/22  -RF     PT - Next Appointment 11/07/22  -RF 11/04/22  -RF     PT Goal Re-Cert Due Date 11/11/22  -RF 11/11/22  -RF        Time Calculation- PT    Total Timed Code Minutes- PT 45 minute(s)  -RF 45 minute(s)  -RF           User Key  (r) = Recorded By, (t) = Taken By, (c) = Cosigned By    Initials Name Provider Type    RF Meron Lei PTA Physical Therapist Assistant                Therapy Charges for Today     Code Description Service Date Service Provider Modifiers Qty    49005142606 HC GAIT TRAINING EA 15 MIN 11/4/2022 Meron Lei, PTA GP, CQ 2    47979539393 HC PT NEUROMUSC RE EDUCATION EA 15 MIN 11/4/2022 Meron Lei PTA GP, CQ 1    57834802706 HC PT THER PROC EA 15 MIN 11/4/2022 Meron Lei, PTA GP, CQ 2    21977297145 HC PT THERAPEUTIC ACT EA 15 MIN 11/4/2022 Meron Lei, LIZETT GP, CQ 1                   Merno Lei PTA  11/4/2022

## 2022-11-04 NOTE — PROGRESS NOTES
[facility]  PROGRESS NOTE     Patient Identification:  Name:  Melchor Hardwick III  Age:  57 y.o.  Sex:  male  :  1965  MRN:  [id number]  Visit Number:  [id number]  ROOM: 110/2S     Primary Care Provider:  Missy Up APRN    Length of stay in inpatient status:  7    Subjective     Chief Compliant:  No chief complaint on file.      History of Presenting Illness: 57-year-old gentleman being followed for debility.  Patient has been recently treated for septic arthritis with involving the right knee with MRSA bacteremia.  Patient is also difficulties orthostatic hypotension during this admission.  Has no other complaints    Objective     Current Hospital Meds:ascorbic acid, 500 mg, Oral, Daily  aspirin, 81 mg, Oral, Daily  DAPTOmycin, 6 mg/kg (Adjusted), Intravenous, Q24H  docusate sodium, 100 mg, Oral, BID  fondaparinux, 2.5 mg, Subcutaneous, Q24H  Insulin Aspart, 0-9 Units, Subcutaneous, TID AC  insulin detemir, 20 Units, Subcutaneous, Q12H  iron polysaccharides, 150 mg, Oral, Daily  levothyroxine, 25 mcg, Oral, Q AM  midodrine, 10 mg, Oral, TID AC  multivitamin with minerals, 1 tablet, Oral, Daily  pantoprazole, 40 mg, Oral, Q AM  pregabalin, 100 mg, Oral, Q12H    Pharmacy Consult,       ----------------------------------------------------------------------------------------------------------------------  Vital Signs:  Temp:  [97.5 °F (36.4 °C)-98 °F (36.7 °C)] 97.5 °F (36.4 °C)  Heart Rate:  [82-88] 88  Resp:  [18-20] 20  BP: (102-123)/(60-72) 123/72  SpO2:  [95 %-100 %] 100 %  on   ;   Device (Oxygen Therapy): room air  Body mass index is 28.34 kg/m².    Wt Readings from Last 3 Encounters:   22 89.6 kg (197 lb 8.5 oz)   22 98 kg (216 lb)   22 115 kg (254 lb)     Intake & Output (last 3 days)        0701   0700  0701   07 0701   07 0701   0700    P.O. 1320 1920 1800 240    I.V. (mL/kg) 250 (2.8)       IV Piggyback 25        Total Intake(mL/kg) 1595 (17.8) 1920 (21.4) 1800 (20.1) 240 (2.7)    Urine (mL/kg/hr)  3000 (1.4) 1900 (0.9)     Stool        Total Output  3000 1900     Net +1595 -1080 -100 +240            Urine Unmeasured Occurrence 9 x 7 x 7 x     Stool Unmeasured Occurrence 1 x           Diet Regular; Thin; Consistent Carbohydrate  ----------------------------------------------------------------------------------------------------------------------  Physical exam:  Constitutional:   No acute distress  HEENT: Normocephalic atraumatic  Neck: Supple   Cardiovascular: Regular rate and rhythm  Pulmonary/Chest: Clear to auscultation  Abdominal: Positive bowel sounds soft.   Musculoskeletal: Recent surgery on right need for cleanout  Neurological: No focal deficits  Skin: No rash  Peripheral vascular:  Genitourinary:  ----------------------------------------------------------------------------------------------------------------------    Last echocardiogram:  Results for orders placed during the hospital encounter of 10/28/22    Adult Transesophageal Echo (TYRESE) W/ Cont if Necessary Per Protocol    Interpretation Summary  •  Indication for TYRESE -to rule out infective endocarditis in a patient with MRSA bacteremia.  •  Findings-  •  Left ventricular systolic function is normal. Estimated left ventricular EF = 60%  •  Left atrial appendage is well visualized and is free of thrombus.  •  Saline test was negative for the evidence of shunt in interatrial septum.  •  The tricuspid valve appeared normal in structure. There was a moderate sized echodense structure seen above  the posterior tricuspid leaflet but not attached to the leaflet. The appearance and location are not typical for regurgitation. This structure could be a normal variant. No evidence of tricuspid valve stenosis or significant regurgitations.  •  Other valves also appear normal in structure with no evidence of stenosis or significant regurgitations.  No vegetations seen on  these valves.  •  There is no evidence of pericardial effusion.  •  Comment-  •  In view of presence of MRSA bacteremia, recommend to extend antibiotic therapy for possible infective endocarditis and repeat TYRESE in 3 months.    ----------------------------------------------------------------------------------------------------------------------  Results from last 7 days   Lab Units 10/31/22  0148 10/30/22  0109 10/29/22  0112   CRP mg/dL  --   --  3.73*   WBC 10*3/mm3 3.12* 3.85 2.98*   HEMOGLOBIN g/dL 8.8* 8.2* 8.0*   HEMATOCRIT % 28.8* 26.9* 26.1*   MCV fL 84.7 85.1 84.5   MCHC g/dL 30.6* 30.5* 30.7*   PLATELETS 10*3/mm3 102* 96* 90*         Results from last 7 days   Lab Units 10/31/22  0148 10/30/22  0109 10/29/22  0112   SODIUM mmol/L 138 136 133*   POTASSIUM mmol/L 4.1 3.9 3.8   MAGNESIUM mg/dL  --  2.1 1.8   CHLORIDE mmol/L 103 102 101   CO2 mmol/L 26.6 26.1 25.3   BUN mg/dL 9 11 10   CREATININE mg/dL 0.61* 0.55* 0.63*   CALCIUM mg/dL 8.0* 8.1* 8.2*   GLUCOSE mg/dL 154* 211* 152*   ALBUMIN g/dL  --   --  2.13*   BILIRUBIN mg/dL  --   --  0.4   ALK PHOS U/L  --   --  289*   AST (SGOT) U/L  --   --  30   ALT (SGPT) U/L  --   --  26   Estimated Creatinine Clearance: 150.4 mL/min (A) (by C-G formula based on SCr of 0.61 mg/dL (L)).  No results found for: AMMONIA  Results from last 7 days   Lab Units 10/29/22  0112   CK TOTAL U/L 34             Glucose   Date/Time Value Ref Range Status   11/04/2022 1106 188 (H) 70 - 130 mg/dL Final     Comment:     Meter: [id number] : [id number] NADJA AVILESA   11/04/2022 0553 149 (H) 70 - 130 mg/dL Final     Comment:     Meter: [id number] : [id number] Goldman Rosie   11/03/2022 1947 302 (H) 70 - 130 mg/dL Final     Comment:     Meter: [id number] : [id number] Howard Crystal   11/03/2022 1626 249 (H) 70 - 130 mg/dL Final     Comment:     Meter: [id number] : [id number] SAEZ JUAN CARLOS   11/03/2022 1111 202 (H) 70 - 130 mg/dL Final     Comment:     Meter: [id number]  : [id number] Penelope Dawson   11/03/2022 0600 87 70 - 130 mg/dL Final     Comment:     Meter: [id number] : [id number] Susi Velez   11/02/2022 1945 237 (H) 70 - 130 mg/dL Final     Comment:     Meter: [id number] : [id number] Lincoln Crystal   11/02/2022 1552 362 (H) 70 - 130 mg/dL Final     Comment:     Meter: [id number] : [id number] mark burnetteengill     Lab Results   Component Value Date    TSH 2.390 10/10/2022    FREET4 0.87 (L) 10/10/2022     No results found for: PREGTESTUR, PREGSERUM, HCG, HCGQUANT  Pain Management Panel     Pain Management Panel Latest Ref Rng & Units 5/24/2022 5/11/2022    CREATININE UR mg/dL 91.0 -    AMPHETAMINES SCREEN, URINE Negative - Negative    BARBITURATES SCREEN Negative - Negative    BENZODIAZEPINE SCREEN, URINE Negative - Negative    BUPRENORPHINEUR Negative - Negative    COCAINE SCREEN, URINE Negative - Negative    METHADONE SCREEN, URINE Negative - Negative    METHAMPHETAMINEUR Negative - Negative        Brief Urine Lab Results  (Last result in the past 365 days)      Color   Clarity   Blood   Leuk Est   Nitrite   Protein   CREAT   Urine HCG        10/19/22 0746 Yellow   Clear   Negative   Negative   Negative   Negative               No results found for: BLOODCX      No results found for: URINECX  No results found for: WOUNDCX  No results found for: STOOLCX  Results from last 7 days   Lab Units 10/29/22  0112   CRP mg/dL 3.73*       I have personally looked at the labs and they are summarized above.  ----------------------------------------------------------------------------------------------------------------------  Detailed radiology reports for the last 24 hours:    Imaging Results (Last 24 Hours)     ** No results found for the last 24 hours. **        Final impressions for the last 30 days of radiology reports:    Adult Transesophageal Echo (TYRESE) W/ Cont if Necessary Per Protocol    Addendum Date: 11/4/2022    •  Indication for TYRESE -to rule out infective  endocarditis in a patient with MRSA bacteremia. •  Findings- •  Left ventricular systolic function is normal. Estimated left ventricular EF = 60% •  Left atrial appendage is well visualized and is free of thrombus. •  Saline test was negative for the evidence of shunt in interatrial septum. •  The tricuspid valve appeared normal in structure. There was a moderate sized echodense structure seen above  the posterior tricuspid leaflet but not attached to the leaflet. The appearance and location are not typical for regurgitation. This structure could be a normal variant. No evidence of tricuspid valve stenosis or significant regurgitations. •  Other valves also appear normal in structure with no evidence of stenosis or significant regurgitations.  No vegetations seen on these valves. •  There is no evidence of pericardial effusion. •  Comment- •  In view of presence of MRSA bacteremia, recommend to extend antibiotic therapy for possible infective endocarditis and repeat TYRESE in 3 months.     Addendum Date: 11/3/2022    •  Indication for TYRESE -to rule out infective endocarditis in a patient with MRSA bacteremia. •  Findings- •  Left ventricular systolic function is normal. Estimated left ventricular EF = 60% •  Left atrial appendage is well visualized and is free of thrombus. •  Saline test was negative for the evidence of shunt in interatrial septum. •  The tricuspid valve appeared normal in structure. There was a moderate sized echodense structure seen above  the posterior tricuspid leaflet but not attached to the leaflet. The appearance and location are not typical for regurgitation. This structure could be a normal variant. No evidence of tricuspid valve stenosis or significant regurgitations. •  Other valves also appear normal in structure with no evidence of stenosis or significant regurgitations.  No vegetations seen on these valves. •  There is no evidence of pericardial effusion. •  Comment- •  In view of presence  of MRSA bacteremia, recommend to extend antibiotic therapy for infective endocarditis and repeat TYRESE in 3 months.     Addendum Date: 11/3/2022    •  Indication for TYRESE -to rule out infective endocarditis in a patient with MRSA bacteremia. •  Findings- •  Left ventricular systolic function is normal. Estimated left ventricular EF = 60% •  Left atrial appendage is well visualized and is free of thrombus. •  Saline test was negative for the evidence of shunt in interatrial septum. •  The tricuspid valve appeared normal in structure. There was a moderate sized echodense structure seen above  the posterior tricuspid leaflet but not attached to the leaflet. The appearance and location are not typical for regurgitation. This structure could be a normal variant. No evidence of tricuspid valve stenosis or significant regurgitations. •  Other valves also appear normal in structure with no evidence of stenosis or significant regurgitations.  No vegetations seen on these valves. •  There is no evidence of pericardial effusion.     Addendum Date: 11/3/2022    •  Indication for TYRESE -to rule out infective endocarditis in a patient with MRSA bacteremia. •  Findings- •  Left ventricular systolic function is normal. Estimated left ventricular EF = 60% •  Left atrial appendage is well visualized and is free of thrombus. •  Saline test was negative for the evidence of shunt in interatrial septum. •  The tricuspid valve appeared normal in structure. There was a moderate sized echodense structure seen in both the posterior tricuspid leaflet but not attached to the leaflet. The appearance and location are not typical for regurgitation. This structure could be a normal variant. No evidence of tricuspid valve stenosis or significant regurgitations. •  Other valves also appear normal in structure with no evidence of stenosis or significant regurgitations.  No vegetations seen on these valves. •  There is no evidence of pericardial effusion.      CT knee right wo contrast    Result Date: 10/13/2022  1. Mixed density effusion which does contain air. 2. No evidence of osteomyelitis at the time of the study.  This report was finalized on 10/13/2022 3:08 PM by Dr. Lobo Ring MD.      XR Chest 1 View    Result Date: 10/18/2022  Probable airspace disease in the right lung with low lung volumes. Right PICC line at the level of diaphragm.  Signer Name: Yaquelin Ferreira MD  Signed: 10/18/2022 9:00 PM  Workstation Name: [facility]  Radiology [facility]    XR Chest 1 View    Result Date: 10/6/2022    Unremarkable exam. No acute cardiopulmonary findings identified.  This report was finalized on 10/6/2022 8:24 AM by Dr. Oswaldo Brown MD.      CT Angiogram Chest Pulmonary Embolism    Result Date: 10/13/2022  Elevated right diaphragm with volume loss and atelectasis at the right lung base. Linear scarring at the right lung apex. No definite acute infiltrates. No evidence of pulmonary embolism. Signer Name: Abner Billy MD  Signed: 10/13/2022 11:52 PM  Workstation Name: [facility]  Radiology [facility]    US Venous Doppler Lower Extremity Bilateral (duplex)    Result Date: 10/13/2022  No deep venous thrombus seen in either lower extremity. Signer Name: Abner Billy MD  Signed: 10/13/2022 11:53 PM  Workstation Name: [facility]  [facility]    I have personally looked at the radiology images and read the final radiology report.    Assessment & Plan    Debility--patient requiring set up for upper body dressing; moderate assistance for lower body dressing; independent for bed mobility; requiring minimum assistance for up with transfers.  Patient had episode of orthostatic hypotension and could not do ambulation yesterday.    Orthostatic hypotension patient given fluid bolus yesterday along with midodrine 10 mg 3 times daily.  If patient continues have difficulties making have to consider further  measures.    Diabetes mellitus continue current treatment    Recent septic knee and MRSA bacteremia currently on daptomycin.  Patient did have TYRESE and on second evaluation by cardiology they felt that cannot completely rule out the possibility of vegetation.  Recommend full course of IV antibiotics for treatment of possible endocarditis.    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

## 2022-11-05 LAB
ANION GAP SERPL CALCULATED.3IONS-SCNC: 7.3 MMOL/L (ref 5–15)
BASOPHILS # BLD AUTO: 0.01 10*3/MM3 (ref 0–0.2)
BASOPHILS NFR BLD AUTO: 0.3 % (ref 0–1.5)
BUN SERPL-MCNC: 11 MG/DL (ref 6–20)
BUN/CREAT SERPL: 15.1 (ref 7–25)
CALCIUM SPEC-SCNC: 8.3 MG/DL (ref 8.6–10.5)
CHLORIDE SERPL-SCNC: 101 MMOL/L (ref 98–107)
CO2 SERPL-SCNC: 24.7 MMOL/L (ref 22–29)
CREAT SERPL-MCNC: 0.73 MG/DL (ref 0.76–1.27)
DEPRECATED RDW RBC AUTO: 56.9 FL (ref 37–54)
EGFRCR SERPLBLD CKD-EPI 2021: 106.1 ML/MIN/1.73
EOSINOPHIL # BLD AUTO: 0.08 10*3/MM3 (ref 0–0.4)
EOSINOPHIL NFR BLD AUTO: 2.6 % (ref 0.3–6.2)
ERYTHROCYTE [DISTWIDTH] IN BLOOD BY AUTOMATED COUNT: 18.7 % (ref 12.3–15.4)
GLUCOSE BLDC GLUCOMTR-MCNC: 116 MG/DL (ref 70–130)
GLUCOSE BLDC GLUCOMTR-MCNC: 191 MG/DL (ref 70–130)
GLUCOSE BLDC GLUCOMTR-MCNC: 206 MG/DL (ref 70–130)
GLUCOSE SERPL-MCNC: 181 MG/DL (ref 65–99)
HCT VFR BLD AUTO: 28.7 % (ref 37.5–51)
HGB BLD-MCNC: 8.9 G/DL (ref 13–17.7)
IMM GRANULOCYTES # BLD AUTO: 0 10*3/MM3 (ref 0–0.05)
IMM GRANULOCYTES NFR BLD AUTO: 0 % (ref 0–0.5)
LYMPHOCYTES # BLD AUTO: 0.97 10*3/MM3 (ref 0.7–3.1)
LYMPHOCYTES NFR BLD AUTO: 31.2 % (ref 19.6–45.3)
MCH RBC QN AUTO: 25.8 PG (ref 26.6–33)
MCHC RBC AUTO-ENTMCNC: 31 G/DL (ref 31.5–35.7)
MCV RBC AUTO: 83.2 FL (ref 79–97)
MONOCYTES # BLD AUTO: 0.3 10*3/MM3 (ref 0.1–0.9)
MONOCYTES NFR BLD AUTO: 9.6 % (ref 5–12)
NEUTROPHILS NFR BLD AUTO: 1.75 10*3/MM3 (ref 1.7–7)
NEUTROPHILS NFR BLD AUTO: 56.3 % (ref 42.7–76)
NRBC BLD AUTO-RTO: 0 /100 WBC (ref 0–0.2)
PLATELET # BLD AUTO: 90 10*3/MM3 (ref 140–450)
PMV BLD AUTO: 10.6 FL (ref 6–12)
POTASSIUM SERPL-SCNC: 4 MMOL/L (ref 3.5–5.2)
RBC # BLD AUTO: 3.45 10*6/MM3 (ref 4.14–5.8)
SODIUM SERPL-SCNC: 133 MMOL/L (ref 136–145)
WBC NRBC COR # BLD: 3.11 10*3/MM3 (ref 3.4–10.8)

## 2022-11-05 PROCEDURE — 99232 SBSQ HOSP IP/OBS MODERATE 35: CPT | Performed by: PHYSICIAN ASSISTANT

## 2022-11-05 PROCEDURE — 63710000001 INSULIN ASPART PER 5 UNITS: Performed by: FAMILY MEDICINE

## 2022-11-05 PROCEDURE — 85025 COMPLETE CBC W/AUTO DIFF WBC: CPT | Performed by: FAMILY MEDICINE

## 2022-11-05 PROCEDURE — 25010000002 DAPTOMYCIN PER 1 MG: Performed by: FAMILY MEDICINE

## 2022-11-05 PROCEDURE — 80048 BASIC METABOLIC PNL TOTAL CA: CPT | Performed by: FAMILY MEDICINE

## 2022-11-05 PROCEDURE — 25010000002 FONDAPARINUX PER 0.5 MG: Performed by: FAMILY MEDICINE

## 2022-11-05 PROCEDURE — 82962 GLUCOSE BLOOD TEST: CPT

## 2022-11-05 PROCEDURE — 63710000001 INSULIN DETEMIR PER 5 UNITS: Performed by: FAMILY MEDICINE

## 2022-11-05 RX ORDER — SODIUM CHLORIDE 9 MG/ML
75 INJECTION, SOLUTION INTRAVENOUS CONTINUOUS
Status: DISCONTINUED | OUTPATIENT
Start: 2022-11-05 | End: 2022-11-06

## 2022-11-05 RX ADMIN — PREGABALIN 100 MG: 100 CAPSULE ORAL at 21:10

## 2022-11-05 RX ADMIN — ASPIRIN 81 MG: 81 TABLET, COATED ORAL at 08:31

## 2022-11-05 RX ADMIN — INSULIN ASPART 4 UNITS: 100 INJECTION, SOLUTION INTRAVENOUS; SUBCUTANEOUS at 17:16

## 2022-11-05 RX ADMIN — PANTOPRAZOLE SODIUM 40 MG: 40 TABLET, DELAYED RELEASE ORAL at 05:04

## 2022-11-05 RX ADMIN — INSULIN DETEMIR 20 UNITS: 100 INJECTION, SOLUTION SUBCUTANEOUS at 09:26

## 2022-11-05 RX ADMIN — CARBIDOPA AND LEVODOPA 10 MG: 50; 200 TABLET, EXTENDED RELEASE ORAL at 08:31

## 2022-11-05 RX ADMIN — HYDROCODONE BITARTRATE AND ACETAMINOPHEN 1 TABLET: 5; 325 TABLET ORAL at 05:03

## 2022-11-05 RX ADMIN — OXYCODONE HYDROCHLORIDE AND ACETAMINOPHEN 500 MG: 500 TABLET ORAL at 08:31

## 2022-11-05 RX ADMIN — INSULIN ASPART 2 UNITS: 100 INJECTION, SOLUTION INTRAVENOUS; SUBCUTANEOUS at 11:53

## 2022-11-05 RX ADMIN — Medication 1 TABLET: at 08:31

## 2022-11-05 RX ADMIN — CARBIDOPA AND LEVODOPA 10 MG: 50; 200 TABLET, EXTENDED RELEASE ORAL at 17:16

## 2022-11-05 RX ADMIN — SODIUM CHLORIDE 75 ML/HR: 9 INJECTION, SOLUTION INTRAVENOUS at 12:41

## 2022-11-05 RX ADMIN — SODIUM CHLORIDE 500 ML: 9 INJECTION, SOLUTION INTRAVENOUS at 10:31

## 2022-11-05 RX ADMIN — INSULIN DETEMIR 20 UNITS: 100 INJECTION, SOLUTION SUBCUTANEOUS at 21:11

## 2022-11-05 RX ADMIN — DAPTOMYCIN 500 MG: 500 INJECTION, POWDER, LYOPHILIZED, FOR SOLUTION INTRAVENOUS at 19:00

## 2022-11-05 RX ADMIN — DOCUSATE SODIUM 100 MG: 100 CAPSULE ORAL at 08:31

## 2022-11-05 RX ADMIN — DOCUSATE SODIUM 100 MG: 100 CAPSULE ORAL at 21:10

## 2022-11-05 RX ADMIN — LEVOTHYROXINE SODIUM 25 MCG: 0.07 TABLET ORAL at 05:04

## 2022-11-05 RX ADMIN — Medication 150 MG: at 08:31

## 2022-11-05 RX ADMIN — FONDAPARINUX SODIUM 2.5 MG: 2.5 INJECTION, SOLUTION SUBCUTANEOUS at 08:31

## 2022-11-05 RX ADMIN — PREGABALIN 100 MG: 100 CAPSULE ORAL at 08:31

## 2022-11-05 RX ADMIN — CARBIDOPA AND LEVODOPA 10 MG: 50; 200 TABLET, EXTENDED RELEASE ORAL at 11:53

## 2022-11-05 NOTE — PROGRESS NOTES
Rehabilitation Nursing  Inpatient Rehabilitation Plan of Care Note    Plan of Care  Pain    Pain Management (Active)  Current Status (10/28/2022 5:07:00 PM): potential for increased pain  Weekly Goal: pain at a tolerable level  Discharge Goal: no pain    Body Function Structure    Skin Integrity (Active)  Current Status (10/28/2022 5:02:00 PM): impaired skin integrity  Weekly Goal: improved skin integrity  Discharge Goal: wound healing    Safety    Potential for Injury (Active)  Current Status (10/28/2022 5:07:00 PM): potential for falls  Weekly Goal: no falls  Discharge Goal: no falls    Signed by: Heleln Mcdonnell RN

## 2022-11-05 NOTE — PROGRESS NOTES
PROGRESS NOTE         Patient Identification:  Name:  Melchor Hardwick III  Age:  57 y.o.  Sex:  male  :  1965  MRN:  [id number]  Visit Number:  [id number]  Primary Care Provider:  Missy Up APRN         LOS: 8 days       ----------------------------------------------------------------------------------------------------------------------  Subjective       Chief Complaints:    No chief complaint on file.        Interval History:      The patient is sitting up in bed on room air in no apparent distress.  Appears very comfortable and denies any complaints at this time.  Reports that he has been up walking with physical therapy, which he has not done since he was in the hospital in [place] several weeks ago.  Right knee appears to be healing well without surrounding erythema or edema.  Afebrile, no diarrhea.  WBC is stable but low at 3.11.    Review of Systems:    Constitutional: no fever, chills and night sweats.  Generalized fatigue.  Eyes: no eye drainage, itching or redness.  HEENT: no mouth sores, dysphagia or nose bleed.  Respiratory: no for shortness of breath, cough or production of sputum.  Cardiovascular: no chest pain, no palpitations, no orthopnea.  Gastrointestinal: no nausea, vomiting or diarrhea. No abdominal pain, hematemesis or rectal bleeding.  Genitourinary: no dysuria or polyuria.  Hematologic/lymphatic: no lymph node abnormalities, no easy bruising or easy bleeding.  Musculoskeletal: no muscle or joint pain.  Skin: No rash and no itching.  Neurological: no loss of consciousness, no seizure, no headache.  Behavioral/Psych: no depression or suicidal ideation.  Endocrine: no hot flashes.  Immunologic: negative.    ----------------------------------------------------------------------------------------------------------------------      Objective       [facility] Meds:  ascorbic acid, 500 mg, Oral, Daily  aspirin, 81 mg, Oral, Daily  DAPTOmycin, 6 mg/kg (Adjusted),  Intravenous, Q24H  docusate sodium, 100 mg, Oral, BID  fondaparinux, 2.5 mg, Subcutaneous, Q24H  Insulin Aspart, 0-9 Units, Subcutaneous, TID AC  insulin detemir, 20 Units, Subcutaneous, Q12H  iron polysaccharides, 150 mg, Oral, Daily  levothyroxine, 25 mcg, Oral, Q AM  midodrine, 10 mg, Oral, TID AC  multivitamin with minerals, 1 tablet, Oral, Daily  pantoprazole, 40 mg, Oral, Q AM  pregabalin, 100 mg, Oral, Q12H      Pharmacy Consult,       ----------------------------------------------------------------------------------------------------------------------    Vital Signs:  Temp:  [98.2 °F (36.8 °C)-98.7 °F (37.1 °C)] 98.2 °F (36.8 °C)  Heart Rate:  [86-89] 86  Resp:  [18-20] 18  BP: (117-118)/(63-67) 118/63  No data found.  SpO2 Percentage    11/04/22 0800 11/04/22 1900 11/05/22 0735   SpO2: 100% 96% 97%     SpO2:  [96 %-97 %] 97 %  on   ;   Device (Oxygen Therapy): room air    Body mass index is 28.34 kg/m².  Wt Readings from Last 3 Encounters:   11/01/22 89.6 kg (197 lb 8.5 oz)   09/23/22 98 kg (216 lb)   06/17/22 115 kg (254 lb)        Intake/Output Summary (Last 24 hours) at 11/5/2022 0831  Last data filed at 11/5/2022 0300  Gross per 24 hour   Intake 840 ml   Output 2775 ml   Net -1935 ml     Diet Regular; Thin; Consistent Carbohydrate  ----------------------------------------------------------------------------------------------------------------------      Physical Exam:    Constitutional: Elderly, chronically ill-appearing male is sitting up in bed on room air in no apparent distress.  CNAs are at bedside.  HENT:  Head: Normocephalic and atraumatic.  Mouth:  Moist mucous membranes.    Eyes:  Conjunctivae and EOM are normal.  No scleral icterus.  Neck:  Neck supple.  No JVD present.    Cardiovascular:  Normal rate, regular rhythm and normal heart sounds with no murmur. No edema.  Pulmonary/Chest:  No respiratory distress, no wheezes, no crackles, with normal breath sounds and good air movement.  Abdominal:   Soft.  Bowel sounds are normal.  No distension and no tenderness.   Musculoskeletal:  No edema, no tenderness, and no deformity.  No swelling or redness of joints.  Neurological:  Alert and oriented to person, place, and time.  No facial droop.  No slurred speech.   Skin:  Skin is warm and dry.  No rash noted.  No pallor.  Right knee surgical incision with good healing.  No surrounding erythema, edema, warmth, or drainage.  Psychiatric:  Normal mood and affect.  Behavior is normal.    ----------------------------------------------------------------------------------------------------------------------            Results from last 7 days   Lab Units 11/05/22  0111 10/31/22  0148 10/30/22  0109   WBC 10*3/mm3 3.11* 3.12* 3.85   HEMOGLOBIN g/dL 8.9* 8.8* 8.2*   HEMATOCRIT % 28.7* 28.8* 26.9*   MCV fL 83.2 84.7 85.1   MCHC g/dL 31.0* 30.6* 30.5*   PLATELETS 10*3/mm3 90* 102* 96*     Results from last 7 days   Lab Units 11/05/22  0111 10/31/22  0148 10/30/22  0109   SODIUM mmol/L 133* 138 136   POTASSIUM mmol/L 4.0 4.1 3.9   MAGNESIUM mg/dL  --   --  2.1   CHLORIDE mmol/L 101 103 102   CO2 mmol/L 24.7 26.6 26.1   BUN mg/dL 11 9 11   CREATININE mg/dL 0.73* 0.61* 0.55*   CALCIUM mg/dL 8.3* 8.0* 8.1*   GLUCOSE mg/dL 181* 154* 211*   Estimated Creatinine Clearance: 125.7 mL/min (A) (by C-G formula based on SCr of 0.73 mg/dL (L)).  No results found for: AMMONIA    Glucose   Date/Time Value Ref Range Status   11/05/2022 0605 116 70 - 130 mg/dL Final     Comment:     Meter: [id number] : [id number] Susi Velez   11/04/2022 2008 256 (H) 70 - 130 mg/dL Final     Comment:     Meter: [id number] : [id number] YAMILET GAMA   11/04/2022 1621 279 (H) 70 - 130 mg/dL Final     Comment:     Meter: [id number] : [id number] SAEZ JUAN CARLOS   11/04/2022 1106 188 (H) 70 - 130 mg/dL Final     Comment:     Meter: [id number] : [id number] SAEZ JUAN CARLOS   11/04/2022 0553 149 (H) 70 - 130 mg/dL Final     Comment:     Meter:  [id number] : [id number] Susi Velez   11/03/2022 1947 302 (H) 70 - 130 mg/dL Final     Comment:     Meter: [id number] : [id number] [place]   11/03/2022 1626 249 (H) 70 - 130 mg/dL Final     Comment:     Meter: [id number] : [id number] NADJA AVILESA   11/03/2022 1111 202 (H) 70 - 130 mg/dL Final     Comment:     Meter: [id number] : [id number] Penelope Dawson     Lab Results   Component Value Date    HGBA1C 8.80 (H) 09/20/2022     Lab Results   Component Value Date    TSH 2.390 10/10/2022    FREET4 0.87 (L) 10/10/2022       Blood Culture   Date Value Ref Range Status   10/24/2022 No growth at 5 days  Final   10/24/2022 No growth at 5 days  Final     No results found for: URINECX  No results found for: WOUNDCX  No results found for: STOOLCX  No results found for: RESPCX  Pain Management Panel       Pain Management Panel Latest Ref Rng & Units 5/24/2022 5/11/2022    CREATININE UR mg/dL 91.0 -    AMPHETAMINES SCREEN, URINE Negative - Negative    BARBITURATES SCREEN Negative - Negative    BENZODIAZEPINE SCREEN, URINE Negative - Negative    BUPRENORPHINEUR Negative - Negative    COCAINE SCREEN, URINE Negative - Negative    METHADONE SCREEN, URINE Negative - Negative    METHAMPHETAMINEUR Negative - Negative              ----------------------------------------------------------------------------------------------------------------------  Imaging Results (Last 24 Hours)       ** No results found for the last 24 hours. **            ----------------------------------------------------------------------------------------------------------------------    Pertinent Infectious Disease Results    CK from 10/29/2022 normal at 34.  COVID-19 PCR from 10/28/2022 negative.  Blood cultures from 10/18/2022 2 out of 2 sets positive for MRSA.  Blood cultures from 10/20/2022 2 out of 2 sets positive for MRSA.  Blood cultures from 10/22/2022 1 out of 2 sets positive for MRSA.  Blood cultures from 10/24/2022 finalized  as no growth.  2D echo from 10/24/2022 reports no evidence of vegetation.  Updated TYRESE on 11/1/2022 showed a moderate sized echodense structure on the posterior tricuspid leaflet but not attached to the leaflet.  The appearance and location are not typical for regurgitation.  This structure could be a normal variant.  No evidence of tricuspid valve stenosis or significant regurgitations.  Other valves also appear normal in structure with no evidence of stenosis or significant regurgitations.  No vegetation seen on these valves.  Recommendation was made to extend antibiotic therapy for possible infective endocarditis and repeat TYRESE in 3 months.  Assessment/Plan       Assessment     Right knee septic arthritis  MRSA bacteremia  Possible endocarditis    Plan      I examined the patient this morning and he appears very comfortable sitting up in bed on room air in no apparent distress.  No new complaints or issues at this time.  Patient reports he has been up walking with physical therapy, which he has not done in several weeks since hospitalization in [place].  Right knee appears to be healing well without surrounding erythema, edema, or warmth.  Lungs are clear to auscultation bilaterally.  Abdomen is soft, nontender, with normal bowel sounds.  Remains afebrile without diarrhea.  White count is stable but low at 3.11.    Recommend to continue on daptomycin through 12/5/2022 for a 6-week course of IV antibiotic therapy based on TYRESE result.  CPK ordered in a.m.    Code Status:   Code Status and Medical Interventions:   Ordered at: 11/03/22 1015     Level Of Support Discussed With:    Patient     Code Status (Patient has no pulse and is not breathing):    CPR (Attempt to Resuscitate)     Medical Interventions (Patient has pulse or is breathing):    Full Support     Release to patient:    Routine Release       Sara Sales PA-C  11/05/22  08:31 EDT

## 2022-11-06 LAB
ANION GAP SERPL CALCULATED.3IONS-SCNC: 11.8 MMOL/L (ref 5–15)
BUN SERPL-MCNC: 11 MG/DL (ref 6–20)
BUN/CREAT SERPL: 16.9 (ref 7–25)
CALCIUM SPEC-SCNC: 8.2 MG/DL (ref 8.6–10.5)
CHLORIDE SERPL-SCNC: 102 MMOL/L (ref 98–107)
CK SERPL-CCNC: 29 U/L (ref 20–200)
CO2 SERPL-SCNC: 21.2 MMOL/L (ref 22–29)
CREAT SERPL-MCNC: 0.65 MG/DL (ref 0.76–1.27)
EGFRCR SERPLBLD CKD-EPI 2021: 109.9 ML/MIN/1.73
GLUCOSE BLDC GLUCOMTR-MCNC: 132 MG/DL (ref 70–130)
GLUCOSE BLDC GLUCOMTR-MCNC: 201 MG/DL (ref 70–130)
GLUCOSE BLDC GLUCOMTR-MCNC: 243 MG/DL (ref 70–130)
GLUCOSE BLDC GLUCOMTR-MCNC: 282 MG/DL (ref 70–130)
GLUCOSE SERPL-MCNC: 209 MG/DL (ref 65–99)
POTASSIUM SERPL-SCNC: 4.2 MMOL/L (ref 3.5–5.2)
SODIUM SERPL-SCNC: 135 MMOL/L (ref 136–145)

## 2022-11-06 PROCEDURE — 63710000001 INSULIN ASPART PER 5 UNITS: Performed by: FAMILY MEDICINE

## 2022-11-06 PROCEDURE — 82550 ASSAY OF CK (CPK): CPT | Performed by: PHYSICIAN ASSISTANT

## 2022-11-06 PROCEDURE — 25010000002 FONDAPARINUX PER 0.5 MG: Performed by: FAMILY MEDICINE

## 2022-11-06 PROCEDURE — 99231 SBSQ HOSP IP/OBS SF/LOW 25: CPT | Performed by: FAMILY MEDICINE

## 2022-11-06 PROCEDURE — 63710000001 INSULIN DETEMIR PER 5 UNITS: Performed by: FAMILY MEDICINE

## 2022-11-06 PROCEDURE — 99232 SBSQ HOSP IP/OBS MODERATE 35: CPT | Performed by: PHYSICIAN ASSISTANT

## 2022-11-06 PROCEDURE — 25010000002 DAPTOMYCIN PER 1 MG: Performed by: FAMILY MEDICINE

## 2022-11-06 PROCEDURE — 80048 BASIC METABOLIC PNL TOTAL CA: CPT | Performed by: FAMILY MEDICINE

## 2022-11-06 PROCEDURE — 82962 GLUCOSE BLOOD TEST: CPT

## 2022-11-06 RX ORDER — BISACODYL 10 MG
10 SUPPOSITORY, RECTAL RECTAL DAILY PRN
Status: DISCONTINUED | OUTPATIENT
Start: 2022-11-06 | End: 2022-11-30 | Stop reason: HOSPADM

## 2022-11-06 RX ORDER — POLYETHYLENE GLYCOL 3350 17 G/17G
17 POWDER, FOR SOLUTION ORAL DAILY
Status: DISCONTINUED | OUTPATIENT
Start: 2022-11-06 | End: 2022-11-30 | Stop reason: HOSPADM

## 2022-11-06 RX ADMIN — PREGABALIN 100 MG: 100 CAPSULE ORAL at 20:59

## 2022-11-06 RX ADMIN — INSULIN DETEMIR 20 UNITS: 100 INJECTION, SOLUTION SUBCUTANEOUS at 10:09

## 2022-11-06 RX ADMIN — HYDROCODONE BITARTRATE AND ACETAMINOPHEN 1 TABLET: 5; 325 TABLET ORAL at 20:59

## 2022-11-06 RX ADMIN — PREGABALIN 100 MG: 100 CAPSULE ORAL at 10:09

## 2022-11-06 RX ADMIN — CARBIDOPA AND LEVODOPA 10 MG: 50; 200 TABLET, EXTENDED RELEASE ORAL at 17:50

## 2022-11-06 RX ADMIN — INSULIN ASPART 4 UNITS: 100 INJECTION, SOLUTION INTRAVENOUS; SUBCUTANEOUS at 17:50

## 2022-11-06 RX ADMIN — POLYETHYLENE GLYCOL (3350) 17 G: 17 POWDER, FOR SOLUTION ORAL at 09:56

## 2022-11-06 RX ADMIN — INSULIN DETEMIR 20 UNITS: 100 INJECTION, SOLUTION SUBCUTANEOUS at 20:59

## 2022-11-06 RX ADMIN — ACETAMINOPHEN 650 MG: 325 TABLET, FILM COATED ORAL at 10:51

## 2022-11-06 RX ADMIN — Medication 150 MG: at 09:56

## 2022-11-06 RX ADMIN — Medication 1 TABLET: at 09:56

## 2022-11-06 RX ADMIN — LEVOTHYROXINE SODIUM 25 MCG: 0.07 TABLET ORAL at 06:11

## 2022-11-06 RX ADMIN — DOCUSATE SODIUM 100 MG: 100 CAPSULE ORAL at 20:59

## 2022-11-06 RX ADMIN — SODIUM CHLORIDE 75 ML/HR: 9 INJECTION, SOLUTION INTRAVENOUS at 01:35

## 2022-11-06 RX ADMIN — ASPIRIN 81 MG: 81 TABLET, COATED ORAL at 09:56

## 2022-11-06 RX ADMIN — CARBIDOPA AND LEVODOPA 10 MG: 50; 200 TABLET, EXTENDED RELEASE ORAL at 06:34

## 2022-11-06 RX ADMIN — INSULIN ASPART 4 UNITS: 100 INJECTION, SOLUTION INTRAVENOUS; SUBCUTANEOUS at 12:25

## 2022-11-06 RX ADMIN — DOCUSATE SODIUM 100 MG: 100 CAPSULE ORAL at 09:56

## 2022-11-06 RX ADMIN — CARBIDOPA AND LEVODOPA 10 MG: 50; 200 TABLET, EXTENDED RELEASE ORAL at 12:25

## 2022-11-06 RX ADMIN — DAPTOMYCIN 500 MG: 500 INJECTION, POWDER, LYOPHILIZED, FOR SOLUTION INTRAVENOUS at 19:02

## 2022-11-06 RX ADMIN — PANTOPRAZOLE SODIUM 40 MG: 40 TABLET, DELAYED RELEASE ORAL at 06:11

## 2022-11-06 RX ADMIN — FONDAPARINUX SODIUM 2.5 MG: 2.5 INJECTION, SOLUTION SUBCUTANEOUS at 09:56

## 2022-11-06 RX ADMIN — OXYCODONE HYDROCHLORIDE AND ACETAMINOPHEN 500 MG: 500 TABLET ORAL at 09:56

## 2022-11-06 NOTE — PROGRESS NOTES
Rehabilitation Nursing  Inpatient Rehabilitation Plan of Care Note    Plan of Care  Copy from Junie    Pain Management (Active)  Current Status (10/28/2022 5:07:00 PM): potential for increased pain  Weekly Goal: pain at a tolerable level  Discharge Goal: no pain    Body Function Structure    Skin Integrity (Active)  Current Status (10/28/2022 5:02:00 PM): impaired skin integrity  Weekly Goal: improved skin integrity  Discharge Goal: wound healing    Safety    Potential for Injury (Active)  Current Status (10/28/2022 5:07:00 PM): potential for falls  Weekly Goal: no falls  Discharge Goal: no falls    Signed by: Naye Wilson, Nurse

## 2022-11-06 NOTE — PROGRESS NOTES
[facility]  PROGRESS NOTE     Patient Identification:  Name:  Melchor Hardwick III  Age:  57 y.o.  Sex:  male  :  1965  MRN:  [id number]  Visit Number:  [id number]  ROOM: 110/2S     Primary Care Provider:  Missy Up APRN    Length of stay in inpatient status:  9    Subjective     Chief Compliant:  No chief complaint on file.      History of Presenting Illness: 57-year-old gentleman being followed for debility.  Patient is recently treated for septic arthritis involving the right knee and did have MRSA bacteremia.  Has had some issues with orthostatic hypotension during the admission.  I have hydrated patient over the last 24 hours.  Patient has no new complaints otherwise except for constipation    Objective     Current Hospital Meds:ascorbic acid, 500 mg, Oral, Daily  aspirin, 81 mg, Oral, Daily  DAPTOmycin, 6 mg/kg (Adjusted), Intravenous, Q24H  docusate sodium, 100 mg, Oral, BID  fondaparinux, 2.5 mg, Subcutaneous, Q24H  Insulin Aspart, 0-9 Units, Subcutaneous, TID AC  insulin detemir, 20 Units, Subcutaneous, Q12H  iron polysaccharides, 150 mg, Oral, Daily  levothyroxine, 25 mcg, Oral, Q AM  midodrine, 10 mg, Oral, TID AC  multivitamin with minerals, 1 tablet, Oral, Daily  pantoprazole, 40 mg, Oral, Q AM  polyethylene glycol, 17 g, Oral, Daily  pregabalin, 100 mg, Oral, Q12H    Pharmacy Consult,       ----------------------------------------------------------------------------------------------------------------------  Vital Signs:  Temp:  [98.8 °F (37.1 °C)] 98.8 °F (37.1 °C)  Heart Rate:  [76-81] 76  Resp:  [20] 20  BP: (119-153)/(64-71) 119/64  SpO2:  [98 %] 98 %  on   ;   Device (Oxygen Therapy): room air  Body mass index is 28.34 kg/m².    Wt Readings from Last 3 Encounters:   22 89.6 kg (197 lb 8.5 oz)   22 98 kg (216 lb)   22 115 kg (254 lb)     Intake & Output (last 3 days)        0701   0700  0701   0700  07 07  0701 11/07 0700    P.O. 1800 1080 1080 240    I.V. (mL/kg)   1000 (11.2)     Total Intake(mL/kg) 1800 (20.1) 1080 (12.1) 2080 (23.2) 240 (2.7)    Urine (mL/kg/hr) 1900 (0.9) 2775 (1.3) 4325 (2)     Stool  0 0     Total Output 1900 2775 4325     Net -100 -1695 -2245 +240            Urine Unmeasured Occurrence 7 x  1 x     Stool Unmeasured Occurrence  1 x 2 x         Diet Regular; Thin; Consistent Carbohydrate  ----------------------------------------------------------------------------------------------------------------------  Physical exam:  Constitutional:   No acute distress  HEENT: Normocephalic atraumatic  Neck:    Supple  Cardiovascular: Regular rate and rhythm  Pulmonary/Chest: Clear to auscultation  Abdominal: Positive bowel sounds soft.   Musculoskeletal: Status post right knee septic arthritis; left foot transtarsal amputation  Neurological: No focal deficits  Skin: No rash  Peripheral vascular:  Genitourinary:  ----------------------------------------------------------------------------------------------------------------------    Last echocardiogram:  Results for orders placed during the hospital encounter of 10/28/22    Adult Transesophageal Echo (TYRESE) W/ Cont if Necessary Per Protocol    Interpretation Summary  •  Indication for TYRESE -to rule out infective endocarditis in a patient with MRSA bacteremia.  •  Findings-  •  Left ventricular systolic function is normal. Estimated left ventricular EF = 60%  •  Left atrial appendage is well visualized and is free of thrombus.  •  Saline test was negative for the evidence of shunt in interatrial septum.  •  The tricuspid valve appeared normal in structure. There was a moderate sized echodense structure seen above  the posterior tricuspid leaflet but not attached to the leaflet. The appearance and location are not typical for regurgitation. This structure could be a normal variant. No evidence of tricuspid valve stenosis or significant regurgitations.  •   Other valves also appear normal in structure with no evidence of stenosis or significant regurgitations.  No vegetations seen on these valves.  •  There is no evidence of pericardial effusion.  •  Comment-  •  In view of presence of MRSA bacteremia, recommend to extend antibiotic therapy for possible infective endocarditis and repeat TYRESE in 3 months.    ----------------------------------------------------------------------------------------------------------------------  Results from last 7 days   Lab Units 11/05/22  0111 10/31/22  0148   WBC 10*3/mm3 3.11* 3.12*   HEMOGLOBIN g/dL 8.9* 8.8*   HEMATOCRIT % 28.7* 28.8*   MCV fL 83.2 84.7   MCHC g/dL 31.0* 30.6*   PLATELETS 10*3/mm3 90* 102*         Results from last 7 days   Lab Units 11/06/22  0032 11/05/22  0111 10/31/22  0148   SODIUM mmol/L 135* 133* 138   POTASSIUM mmol/L 4.2 4.0 4.1   CHLORIDE mmol/L 102 101 103   CO2 mmol/L 21.2* 24.7 26.6   BUN mg/dL 11 11 9   CREATININE mg/dL 0.65* 0.73* 0.61*   CALCIUM mg/dL 8.2* 8.3* 8.0*   GLUCOSE mg/dL 209* 181* 154*   Estimated Creatinine Clearance: 141.2 mL/min (A) (by C-G formula based on SCr of 0.65 mg/dL (L)).  No results found for: AMMONIA  Results from last 7 days   Lab Units 11/06/22 0032   CK TOTAL U/L 29             Glucose   Date/Time Value Ref Range Status   11/06/2022 1054 243 (H) 70 - 130 mg/dL Final     Comment:     Meter: [id number] : [id number] lisa ramirez   11/06/2022 0609 132 (H) 70 - 130 mg/dL Final     Comment:     Meter: [id number] : [id number] YAMILET GAMA   11/05/2022 1637 206 (H) 70 - 130 mg/dL Final     Comment:     Meter: [id number] : [id number] NADJA RANGEL   11/05/2022 1057 191 (H) 70 - 130 mg/dL Final     Comment:     Meter: [id number] : [id number] NADJA AVILESA   11/05/2022 0605 116 70 - 130 mg/dL Final     Comment:     Meter: [id number] : [id number] Susi Velez   11/04/2022 2008 256 (H) 70 - 130 mg/dL Final     Comment:     Meter: [id number] : [id number]  YAMILET GAMA   11/04/2022 1621 279 (H) 70 - 130 mg/dL Final     Comment:     Meter: [id number] : [id number] NADJA RANGEL   11/04/2022 1106 188 (H) 70 - 130 mg/dL Final     Comment:     Meter: [id number] : [id number] NADJA RANGEL     Lab Results   Component Value Date    TSH 2.390 10/10/2022    FREET4 0.87 (L) 10/10/2022     No results found for: PREGTESTUR, PREGSERUM, HCG, HCGQUANT  Pain Management Panel     Pain Management Panel Latest Ref Rng & Units 5/24/2022 5/11/2022    CREATININE UR mg/dL 91.0 -    AMPHETAMINES SCREEN, URINE Negative - Negative    BARBITURATES SCREEN Negative - Negative    BENZODIAZEPINE SCREEN, URINE Negative - Negative    BUPRENORPHINEUR Negative - Negative    COCAINE SCREEN, URINE Negative - Negative    METHADONE SCREEN, URINE Negative - Negative    METHAMPHETAMINEUR Negative - Negative        Brief Urine Lab Results  (Last result in the past 365 days)      Color   Clarity   Blood   Leuk Est   Nitrite   Protein   CREAT   Urine HCG        10/19/22 0746 Yellow   Clear   Negative   Negative   Negative   Negative               No results found for: BLOODCX      No results found for: URINECX  No results found for: WOUNDCX  No results found for: STOOLCX        I have personally looked at the labs and they are summarized above.  ----------------------------------------------------------------------------------------------------------------------  Detailed radiology reports for the last 24 hours:    Imaging Results (Last 24 Hours)     ** No results found for the last 24 hours. **        Final impressions for the last 30 days of radiology reports:    Adult Transesophageal Echo (TYRESE) W/ Cont if Necessary Per Protocol    Addendum Date: 11/4/2022    •  Indication for TYRESE -to rule out infective endocarditis in a patient with MRSA bacteremia. •  Findings- •  Left ventricular systolic function is normal. Estimated left ventricular EF = 60% •  Left atrial appendage is well visualized and is  free of thrombus. •  Saline test was negative for the evidence of shunt in interatrial septum. •  The tricuspid valve appeared normal in structure. There was a moderate sized echodense structure seen above  the posterior tricuspid leaflet but not attached to the leaflet. The appearance and location are not typical for regurgitation. This structure could be a normal variant. No evidence of tricuspid valve stenosis or significant regurgitations. •  Other valves also appear normal in structure with no evidence of stenosis or significant regurgitations.  No vegetations seen on these valves. •  There is no evidence of pericardial effusion. •  Comment- •  In view of presence of MRSA bacteremia, recommend to extend antibiotic therapy for possible infective endocarditis and repeat TYRESE in 3 months.     Addendum Date: 11/3/2022    •  Indication for TYRESE -to rule out infective endocarditis in a patient with MRSA bacteremia. •  Findings- •  Left ventricular systolic function is normal. Estimated left ventricular EF = 60% •  Left atrial appendage is well visualized and is free of thrombus. •  Saline test was negative for the evidence of shunt in interatrial septum. •  The tricuspid valve appeared normal in structure. There was a moderate sized echodense structure seen above  the posterior tricuspid leaflet but not attached to the leaflet. The appearance and location are not typical for regurgitation. This structure could be a normal variant. No evidence of tricuspid valve stenosis or significant regurgitations. •  Other valves also appear normal in structure with no evidence of stenosis or significant regurgitations.  No vegetations seen on these valves. •  There is no evidence of pericardial effusion. •  Comment- •  In view of presence of MRSA bacteremia, recommend to extend antibiotic therapy for infective endocarditis and repeat TYRESE in 3 months.     Addendum Date: 11/3/2022    •  Indication for TYRESE -to rule out infective  endocarditis in a patient with MRSA bacteremia. •  Findings- •  Left ventricular systolic function is normal. Estimated left ventricular EF = 60% •  Left atrial appendage is well visualized and is free of thrombus. •  Saline test was negative for the evidence of shunt in interatrial septum. •  The tricuspid valve appeared normal in structure. There was a moderate sized echodense structure seen above  the posterior tricuspid leaflet but not attached to the leaflet. The appearance and location are not typical for regurgitation. This structure could be a normal variant. No evidence of tricuspid valve stenosis or significant regurgitations. •  Other valves also appear normal in structure with no evidence of stenosis or significant regurgitations.  No vegetations seen on these valves. •  There is no evidence of pericardial effusion.     Addendum Date: 11/3/2022    •  Indication for TYRESE -to rule out infective endocarditis in a patient with MRSA bacteremia. •  Findings- •  Left ventricular systolic function is normal. Estimated left ventricular EF = 60% •  Left atrial appendage is well visualized and is free of thrombus. •  Saline test was negative for the evidence of shunt in interatrial septum. •  The tricuspid valve appeared normal in structure. There was a moderate sized echodense structure seen in both the posterior tricuspid leaflet but not attached to the leaflet. The appearance and location are not typical for regurgitation. This structure could be a normal variant. No evidence of tricuspid valve stenosis or significant regurgitations. •  Other valves also appear normal in structure with no evidence of stenosis or significant regurgitations.  No vegetations seen on these valves. •  There is no evidence of pericardial effusion.     CT knee right wo contrast    Result Date: 10/13/2022  1. Mixed density effusion which does contain air. 2. No evidence of osteomyelitis at the time of the study.  This report was  finalized on 10/13/2022 3:08 PM by Dr. Lobo Ring MD.      XR Chest 1 View    Result Date: 10/18/2022  Probable airspace disease in the right lung with low lung volumes. Right PICC line at the level of diaphragm.  Signer Name: Yaquelin Ferreira MD  Signed: 10/18/2022 9:00 PM  Workstation Name: [facility]  [facility]    CT Angiogram Chest Pulmonary Embolism    Result Date: 10/13/2022  Elevated right diaphragm with volume loss and atelectasis at the right lung base. Linear scarring at the right lung apex. No definite acute infiltrates. No evidence of pulmonary embolism. Signer Name: Abner Billy MD  Signed: 10/13/2022 11:52 PM  Workstation [facility]: [facility]  [facility]    US Venous Doppler Lower Extremity Bilateral (duplex)    Result Date: 10/13/2022  No deep venous thrombus seen in either lower extremity. Signer Name: Abner Billy MD  Signed: 10/13/2022 11:53 PM  Workstation Name: [facility]  [facility]    I have personally looked at the radiology images and read the final radiology report.    Assessment & Plan    Debility--last week patient was had modified independence for bed mobility; requiring minimum assistance contact-guard for chair to bed and sit to stand transfers.  Contact-guard for stand to sit transfers.  Ambulated 6 feet x 2 in the parallel bars and 20 feet with rolling walker in the hallway with minimum assist to contact-guard.    Orthostatic hypotension--currently on midodrine 10 mg 3 times daily I had given patient IV fluids over the last couple of days.  Will discontinue fluids today.    Recent septic knee and MRSA bacteremia--patient's TYRESE has some findings consistent with a possible endocarditis per Dr. Gibson.  He states that is very difficult to ascertain as to whether this was a true vegetation or not but he did recommend completing therapy course.  So we will complete 6-week course of daptomycin.  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  Stable    Hypothyroidism continue Synthroid    VTE Prophylaxis:   Mechanical Order History:     None      Pharmalogical Order History:      Ordered     Dose Route Frequency Stop    10/28/22 4219  fondaparinux (ARIXTRA) injection 2.5 mg         2.5 mg SC Every 24 Hours Scheduled --                    Joe Mike MD  [facility]  11/06/22  11:48 EST

## 2022-11-06 NOTE — PROGRESS NOTES
PROGRESS NOTE         Patient Identification:  Name:  Melchor Hardwick III  Age:  57 y.o.  Sex:  male  :  1965  MRN:  [id number]  Visit Number:  [id number]  Primary Care Provider:  Missy Up APRN         LOS: 9 days       ----------------------------------------------------------------------------------------------------------------------  Subjective       Chief Complaints:    No chief complaint on file.        Interval History:      The patient is sitting up in bed on room air in no apparent distress.  Reports that his right knee feels stiff after not moving much yesterday, but he plans to move more this morning.  Right knee is healing very well without surrounding erythema or edema.  Afebrile, no diarrhea.  CPK on 2022 remains normal at 29.    Review of Systems:    Constitutional: no fever, chills and night sweats.  Generalized fatigue.  Eyes: no eye drainage, itching or redness.  HEENT: no mouth sores, dysphagia or nose bleed.  Respiratory: no for shortness of breath, cough or production of sputum.  Cardiovascular: no chest pain, no palpitations, no orthopnea.  Gastrointestinal: no nausea, vomiting or diarrhea. No abdominal pain, hematemesis or rectal bleeding.  Genitourinary: no dysuria or polyuria.  Hematologic/lymphatic: no lymph node abnormalities, no easy bruising or easy bleeding.  Musculoskeletal: no muscle or joint pain.  Stiff right knee.  Skin: No rash and no itching.  Neurological: no loss of consciousness, no seizure, no headache.  Behavioral/Psych: no depression or suicidal ideation.  Endocrine: no hot flashes.  Immunologic: negative.    ----------------------------------------------------------------------------------------------------------------------      Objective       [facility] Meds:  ascorbic acid, 500 mg, Oral, Daily  aspirin, 81 mg, Oral, Daily  DAPTOmycin, 6 mg/kg (Adjusted), Intravenous, Q24H  docusate sodium, 100 mg, Oral, BID  fondaparinux, 2.5 mg,  Subcutaneous, Q24H  Insulin Aspart, 0-9 Units, Subcutaneous, TID AC  insulin detemir, 20 Units, Subcutaneous, Q12H  iron polysaccharides, 150 mg, Oral, Daily  levothyroxine, 25 mcg, Oral, Q AM  midodrine, 10 mg, Oral, TID AC  multivitamin with minerals, 1 tablet, Oral, Daily  pantoprazole, 40 mg, Oral, Q AM  polyethylene glycol, 17 g, Oral, Daily  pregabalin, 100 mg, Oral, Q12H      Pharmacy Consult,   sodium chloride, 75 mL/hr, Last Rate: 75 mL/hr (11/06/22 0135)      ----------------------------------------------------------------------------------------------------------------------    Vital Signs:  Temp:  [98.8 °F (37.1 °C)] 98.8 °F (37.1 °C)  Heart Rate:  [76-81] 76  Resp:  [20] 20  BP: (119-153)/(64-71) 119/64  No data found.  SpO2 Percentage    11/04/22 1900 11/05/22 0735 11/05/22 1900   SpO2: 96% 97% 98%     SpO2:  [98 %] 98 %  on   ;   Device (Oxygen Therapy): room air    Body mass index is 28.34 kg/m².  Wt Readings from Last 3 Encounters:   11/01/22 89.6 kg (197 lb 8.5 oz)   09/23/22 98 kg (216 lb)   06/17/22 115 kg (254 lb)        Intake/Output Summary (Last 24 hours) at 11/6/2022 0857  Last data filed at 11/6/2022 0610  Gross per 24 hour   Intake 2080 ml   Output 4325 ml   Net -2245 ml     Diet Regular; Thin; Consistent Carbohydrate  ----------------------------------------------------------------------------------------------------------------------      Physical Exam:    Constitutional: Chronically ill-appearing male is sitting up in bed on room air in no apparent distress.  Appears very comfortable.  HENT:  Head: Normocephalic and atraumatic.  Mouth:  Moist mucous membranes.    Eyes:  Conjunctivae and EOM are normal.  No scleral icterus.  Neck:  Neck supple.  No JVD present.    Cardiovascular:  Normal rate, regular rhythm and normal heart sounds with no murmur. No edema.  Pulmonary/Chest:  No respiratory distress, no wheezes, no crackles, with normal breath sounds and good air movement.  Abdominal:   Soft.  Bowel sounds are normal.  No distension and no tenderness.   Musculoskeletal:  No edema, no tenderness, and no deformity.  No swelling or redness of joints.  Neurological:  Alert and oriented to person, place, and time.  No facial droop.  No slurred speech.   Skin:  Skin is warm and dry.  No rash noted.  No pallor.  Right knee surgical incision with good healing and staples removed.  No surrounding erythema, edema, warmth, or drainage.  Psychiatric:  Normal mood and affect.  Behavior is normal.    ----------------------------------------------------------------------------------------------------------------------  Results from last 7 days   Lab Units 11/06/22 0032   CK TOTAL U/L 29           Results from last 7 days   Lab Units 11/05/22  0111 10/31/22  0148   WBC 10*3/mm3 3.11* 3.12*   HEMOGLOBIN g/dL 8.9* 8.8*   HEMATOCRIT % 28.7* 28.8*   MCV fL 83.2 84.7   MCHC g/dL 31.0* 30.6*   PLATELETS 10*3/mm3 90* 102*     Results from last 7 days   Lab Units 11/06/22  0032 11/05/22  0111 10/31/22  0148   SODIUM mmol/L 135* 133* 138   POTASSIUM mmol/L 4.2 4.0 4.1   CHLORIDE mmol/L 102 101 103   CO2 mmol/L 21.2* 24.7 26.6   BUN mg/dL 11 11 9   CREATININE mg/dL 0.65* 0.73* 0.61*   CALCIUM mg/dL 8.2* 8.3* 8.0*   GLUCOSE mg/dL 209* 181* 154*   Estimated Creatinine Clearance: 141.2 mL/min (A) (by C-G formula based on SCr of 0.65 mg/dL (L)).  No results found for: AMMONIA    Glucose   Date/Time Value Ref Range Status   11/06/2022 0609 132 (H) 70 - 130 mg/dL Final     Comment:     Meter: [id number] : [id number] YAMILET NATAN   11/05/2022 1637 206 (H) 70 - 130 mg/dL Final     Comment:     Meter: [id number] : [id number] SAEZBONNIE AVILESA   11/05/2022 1057 191 (H) 70 - 130 mg/dL Final     Comment:     Meter: [id number] : [id number] NADJA RANGEL   11/05/2022 0605 116 70 - 130 mg/dL Final     Comment:     Meter: [id number] : [id number] Susi Velez   11/04/2022 2008 256 (H) 70 - 130 mg/dL Final     Comment:      Meter: [id number] : [id number] YAMILET GAAM   11/04/2022 1621 279 (H) 70 - 130 mg/dL Final     Comment:     Meter: [id number] : [id number] NADJA RANGEL   11/04/2022 1106 188 (H) 70 - 130 mg/dL Final     Comment:     Meter: [id number] : [id number] SAEZBONNIE AVILESA   11/04/2022 0553 149 (H) 70 - 130 mg/dL Final     Comment:     Meter: [id number] : [id number] Susi Velez     Lab Results   Component Value Date    HGBA1C 8.80 (H) 09/20/2022     Lab Results   Component Value Date    TSH 2.390 10/10/2022    FREET4 0.87 (L) 10/10/2022       Blood Culture   Date Value Ref Range Status   10/24/2022 No growth at 5 days  Final   10/24/2022 No growth at 5 days  Final     No results found for: URINECX  No results found for: WOUNDCX  No results found for: STOOLCX  No results found for: RESPCX  Pain Management Panel       Pain Management Panel Latest Ref Rng & Units 5/24/2022 5/11/2022    CREATININE UR mg/dL 91.0 -    AMPHETAMINES SCREEN, URINE Negative - Negative    BARBITURATES SCREEN Negative - Negative    BENZODIAZEPINE SCREEN, URINE Negative - Negative    BUPRENORPHINEUR Negative - Negative    COCAINE SCREEN, URINE Negative - Negative    METHADONE SCREEN, URINE Negative - Negative    METHAMPHETAMINEUR Negative - Negative              ----------------------------------------------------------------------------------------------------------------------  Imaging Results (Last 24 Hours)       ** No results found for the last 24 hours. **            ----------------------------------------------------------------------------------------------------------------------    Pertinent Infectious Disease Results    CPK on 11/6/2022 remains normal at 29.  COVID-19 PCR from 10/28/2022 negative.  Blood cultures from 10/18/2022 2 out of 2 sets positive for MRSA.  Blood cultures from 10/20/2022 2 out of 2 sets positive for MRSA.  Blood cultures from 10/22/2022 1 out of 2 sets positive for MRSA.  Blood cultures from  10/24/2022 finalized as no growth.  2D echo from 10/24/2022 reports no evidence of vegetation.  Updated TYRESE on 11/1/2022 showed a moderate sized echodense structure on the posterior tricuspid leaflet but not attached to the leaflet.  The appearance and location are not typical for regurgitation.  This structure could be a normal variant.  No evidence of tricuspid valve stenosis or significant regurgitations.  Other valves also appear normal in structure with no evidence of stenosis or significant regurgitations.  No vegetation seen on these valves.  Recommendation was made to extend antibiotic therapy for possible infective endocarditis and repeat TYRESE in 3 months.  Assessment/Plan       Assessment     Right knee septic arthritis  MRSA bacteremia  Possible endocarditis    Plan      I examined the patient this morning and he remains on room air in no apparent distress.  Appears comfortable and reports only stiffness of the right knee after not moving very much yesterday.  He plans to ambulate this morning.  Right knee is healing very well without surrounding erythema or edema.  Lungs are clear to auscultation bilaterally.  Abdomen is soft, nontender, with normal bowel sounds.  Remains afebrile without diarrhea.  CPK remains normal at 29.  No other new labs at this time.    Recommend to continue on daptomycin through 12/5/2022 for a 6-week course of IV antibiotic therapy based on TYRESE results and discussion with Dr. Gibson.  Patient reports that he has a family member who can help with IV antibiotic therapy if he were discharged prior to completing antibiotic therapy.    Code Status:   Code Status and Medical Interventions:   Ordered at: 11/03/22 1015     Level Of Support Discussed With:    Patient     Code Status (Patient has no pulse and is not breathing):    CPR (Attempt to Resuscitate)     Medical Interventions (Patient has pulse or is breathing):    Full Support     Release to patient:    Routine Release        Sara Sales PA-C  11/06/22  08:57 EST

## 2022-11-07 LAB
GLUCOSE BLDC GLUCOMTR-MCNC: 156 MG/DL (ref 70–130)
GLUCOSE BLDC GLUCOMTR-MCNC: 218 MG/DL (ref 70–130)
GLUCOSE BLDC GLUCOMTR-MCNC: 253 MG/DL (ref 70–130)
GLUCOSE BLDC GLUCOMTR-MCNC: 277 MG/DL (ref 70–130)
GLUCOSE BLDC GLUCOMTR-MCNC: 85 MG/DL (ref 70–130)

## 2022-11-07 PROCEDURE — 82962 GLUCOSE BLOOD TEST: CPT

## 2022-11-07 PROCEDURE — 97110 THERAPEUTIC EXERCISES: CPT

## 2022-11-07 PROCEDURE — 99232 SBSQ HOSP IP/OBS MODERATE 35: CPT | Performed by: PHYSICIAN ASSISTANT

## 2022-11-07 PROCEDURE — 63710000001 INSULIN DETEMIR PER 5 UNITS: Performed by: FAMILY MEDICINE

## 2022-11-07 PROCEDURE — 99231 SBSQ HOSP IP/OBS SF/LOW 25: CPT | Performed by: INTERNAL MEDICINE

## 2022-11-07 PROCEDURE — 97535 SELF CARE MNGMENT TRAINING: CPT

## 2022-11-07 PROCEDURE — 25010000002 DAPTOMYCIN PER 1 MG: Performed by: FAMILY MEDICINE

## 2022-11-07 PROCEDURE — 97530 THERAPEUTIC ACTIVITIES: CPT

## 2022-11-07 PROCEDURE — 97116 GAIT TRAINING THERAPY: CPT

## 2022-11-07 PROCEDURE — 25010000002 FONDAPARINUX PER 0.5 MG: Performed by: FAMILY MEDICINE

## 2022-11-07 PROCEDURE — 63710000001 INSULIN ASPART PER 5 UNITS: Performed by: FAMILY MEDICINE

## 2022-11-07 PROCEDURE — 36410 VNPNXR 3YR/> PHY/QHP DX/THER: CPT

## 2022-11-07 PROCEDURE — C1751 CATH, INF, PER/CENT/MIDLINE: HCPCS

## 2022-11-07 RX ORDER — FLUDROCORTISONE ACETATE 0.1 MG/1
50 TABLET ORAL DAILY
Status: DISCONTINUED | OUTPATIENT
Start: 2022-11-07 | End: 2022-11-30 | Stop reason: HOSPADM

## 2022-11-07 RX ADMIN — FLUDROCORTISONE ACETATE 50 MCG: 0.1 TABLET ORAL at 16:51

## 2022-11-07 RX ADMIN — VITAMINS A AND D OINTMENT 1 APPLICATION: 15.5; 53.4 OINTMENT TOPICAL at 21:45

## 2022-11-07 RX ADMIN — INSULIN DETEMIR 20 UNITS: 100 INJECTION, SOLUTION SUBCUTANEOUS at 21:45

## 2022-11-07 RX ADMIN — INSULIN ASPART 2 UNITS: 100 INJECTION, SOLUTION INTRAVENOUS; SUBCUTANEOUS at 12:02

## 2022-11-07 RX ADMIN — ASPIRIN 81 MG: 81 TABLET, COATED ORAL at 08:34

## 2022-11-07 RX ADMIN — PREGABALIN 100 MG: 100 CAPSULE ORAL at 08:34

## 2022-11-07 RX ADMIN — Medication 150 MG: at 08:34

## 2022-11-07 RX ADMIN — LEVOTHYROXINE SODIUM 25 MCG: 0.07 TABLET ORAL at 05:33

## 2022-11-07 RX ADMIN — DAPTOMYCIN 500 MG: 500 INJECTION, POWDER, LYOPHILIZED, FOR SOLUTION INTRAVENOUS at 19:06

## 2022-11-07 RX ADMIN — INSULIN DETEMIR 20 UNITS: 100 INJECTION, SOLUTION SUBCUTANEOUS at 08:40

## 2022-11-07 RX ADMIN — OXYCODONE HYDROCHLORIDE AND ACETAMINOPHEN 500 MG: 500 TABLET ORAL at 08:34

## 2022-11-07 RX ADMIN — CARBIDOPA AND LEVODOPA 10 MG: 50; 200 TABLET, EXTENDED RELEASE ORAL at 12:02

## 2022-11-07 RX ADMIN — FONDAPARINUX SODIUM 2.5 MG: 2.5 INJECTION, SOLUTION SUBCUTANEOUS at 08:34

## 2022-11-07 RX ADMIN — PREGABALIN 100 MG: 100 CAPSULE ORAL at 21:45

## 2022-11-07 RX ADMIN — CARBIDOPA AND LEVODOPA 10 MG: 50; 200 TABLET, EXTENDED RELEASE ORAL at 06:33

## 2022-11-07 RX ADMIN — HYDROCODONE BITARTRATE AND ACETAMINOPHEN 1 TABLET: 5; 325 TABLET ORAL at 21:45

## 2022-11-07 RX ADMIN — DOCUSATE SODIUM 100 MG: 100 CAPSULE ORAL at 21:45

## 2022-11-07 RX ADMIN — DOCUSATE SODIUM 100 MG: 100 CAPSULE ORAL at 08:34

## 2022-11-07 RX ADMIN — CARBIDOPA AND LEVODOPA 10 MG: 50; 200 TABLET, EXTENDED RELEASE ORAL at 16:51

## 2022-11-07 RX ADMIN — Medication 1 TABLET: at 08:34

## 2022-11-07 RX ADMIN — INSULIN ASPART 6 UNITS: 100 INJECTION, SOLUTION INTRAVENOUS; SUBCUTANEOUS at 17:05

## 2022-11-07 RX ADMIN — PANTOPRAZOLE SODIUM 40 MG: 40 TABLET, DELAYED RELEASE ORAL at 05:34

## 2022-11-07 RX ADMIN — POLYETHYLENE GLYCOL (3350) 17 G: 17 POWDER, FOR SOLUTION ORAL at 08:35

## 2022-11-07 NOTE — PLAN OF CARE
Goal Outcome Evaluation:         Resting in bed with not complaints at this time. Continue current plan of care.

## 2022-11-07 NOTE — THERAPY TREATMENT NOTE
Inpatient Rehabilitation - Occupational Therapy Treatment Note    YVONNE Gaines     Patient Name: Melchor Hardwick III  : 1965  MRN: [id number]    Today's Date: 2022                 Admit Date: 10/28/2022         ICD-10-CM ICD-9-CM   1. Staphylococcal arthritis of right knee (HCC)  M00.061 711.06     041.10       Patient Active Problem List   Diagnosis   • Hyperlipidemia   • Hypertensive disorder   • Obesity   • Type 2 diabetes mellitus with hyperglycemia, with long-term current use of insulin (HCC)   • Gas gangrene of foot (HCC)   • Cellulitis in diabetic foot (HCC)   • Other acute osteomyelitis of left foot (HCC)   • Osteomyelitis (HCC)   • Critical illness myopathy   • Staphylococcal arthritis of right knee (HCC)   • Other cirrhosis of liver (HCC)   • MRSA bacteremia   • Endocarditis of tricuspid valve   • Wound of right buttock   • History of osteomyelitis   • Debility       Past Medical History:   Diagnosis Date   • Diabetes mellitus (HCC)    • Hyperlipidemia    • Hypertension    • Pyogenic arthritis of right knee joint, due to unspecified organism (HCC) 2022       Past Surgical History:   Procedure Laterality Date   • ABSCESS DRAINAGE      PERINEUM   • AMPUTATION FOOT Left 2022    Procedure: AMPUTATION FOOT;  Surgeon: Addison Colby MD;  Location: [facility] OR;  Service: Podiatry;  Laterality: Left;   • INCISION AND DRAINAGE LEG Left 05/10/2022    Procedure: INCISION AND DRAINAGE LOWER EXTREMITY;  Surgeon: Addison Colby MD;  Location: [facility] OR;  Service: Podiatry;  Laterality: Left;   • KNEE INCISION AND DRAINAGE Right 2022    Procedure: KNEE INCISION AND DRAINAGE RIGHT;  Surgeon: Brain Bentley MD;  Location: [facility] OR;  Service: Orthopedics;  Laterality: Right;   • WOUND DEBRIDEMENT Left 2022    Procedure: DEBRIDEMENT FOOT;  Surgeon: Addison Colby MD;  Location: [facility] OR;  Service: Podiatry;  Laterality: Left;             IRF OT ASSESSMENT FLOWSHEET (last 12 hours)      IRF OT Evaluation and Treatment     Row Name 11/07/22 1149          OT Time and Intention    Document Type daily treatment  -HB     Mode of Treatment individual therapy;occupational therapy  -HB     Total Minutes, Occupational Therapy 45  -HB     Patient Effort good  -HB     Symptoms Noted During/After Treatment none  -HB     Row Name 11/07/22 1149          General Information    Patient Profile Reviewed yes  -HB     Patient/Family/Caregiver Comments/Observations Patient and RN okkavitha OT this date.  -HB     General Observations of Patient Patient tolerated OT well with no adverse reactions.  -HB     Existing Precautions/Restrictions fall;brace worn when out of bed  LLE  -HB     Row Name 11/07/22 1149          Pain Assessment    Pretreatment Pain Rating 3/10  -HB     Posttreatment Pain Rating 3/10  -HB     Pain Location - back  -HB     Row Name 11/07/22 1149          Cognition/Psychosocial    Affect/Mental Status (Cognition) WFL  -     Orientation Status (Cognition) oriented x 4  -HB     Follows Commands (Cognition) WFL  -HB     Row Name 11/07/22 1149          Upper Body Dressing    [place] Level (Upper Body Dressing) upper body dressing skills;pull over garment;set up assistance  -HB     Position (Upper Body Dressing) supine  -HB     Row Name 11/07/22 1149          Grooming    [place] Level (Grooming) grooming skills;set up  -HB     Position (Grooming) supine  -HB     Row Name 11/07/22 1149          Bed Mobility    Supine-Sit [place] (Bed Mobility) modified independence  -     Row Name 11/07/22 1149          Bed-Chair Transfer    Bed-Chair [place] (Transfers) minimum assist (75% patient effort);nonverbal cues (demo/gesture);verbal cues;contact guard  -     Assistive Device (Bed-Chair Transfers) walker, front-wheeled;wheelchair  -     Row Name 11/07/22 1149          Sit-Stand Transfer    Sit-Stand [place] (Transfers) minimum assist (75% patient effort);nonverbal cues  (demo/gesture);verbal cues  -HB     Assistive Device (Sit-Stand Transfers) walker, front-wheeled  -HB     Row Name 11/07/22 1149          Stand-Sit Transfer    Stand-Sit [place] (Transfers) minimum assist (75% patient effort);nonverbal cues (demo/gesture);verbal cues  -HB     Assistive Device (Stand-Sit Transfers) wheelchair;walker, front-wheeled  -HB     Row Name 11/07/22 1149          Motor Skills    Motor Skills coordination;functional endurance;motor control/coordination interventions  -     Motor Control/Coordination Interventions fine motor manipulation/dexterity activities;therapeutic exercise/ROM;gross motor coordination activities  -     Therapeutic Exercise shoulder;elbow/forearm;wrist;hand  -HB     Additional Documentation --  rickshaw 10lbs 4 sets of 20 reps; PRE 3x 3min; wrist rolls; rest between tasks  -     Row Name 11/07/22 1149          Positioning and Restraints    Pre-Treatment Position in bed  -HB     Post Treatment Position wheelchair  -HB     In Bed with PT  -     Row Name 11/07/22 1149          Transfer Goal 1 (OT-IRF)    Activity/Assistive Device (Transfer Goal 1, OT-IRF) toilet  -HB     [place] Level (Transfer Goal 1, OT-IRF) contact guard required  -HB     Time Frame (Transfer Goal 1, OT-IRF) short-term goal (STG)  -HB     Progress/Outcomes (Transfer Goal 1, OT-IRF) good progress toward goal  -HB     Row Name 11/07/22 1149          Transfer Goal 2 (OT-IRF)    Activity/Assistive Device (Transfer Goal 2, OT-IRF) toilet  -HB     [place] Level (Transfer Goal 2, OT-IRF) supervision required  -HB     Time Frame (Transfer Goal 2, OT-IRF) long-term goal (LTG)  -HB     Progress/Outcomes (Transfer Goal 2, OT-IRF) good progress toward goal  -HB     Row Name 11/07/22 1149          LB Dressing Goal 1 (OT-IRF)    Activity/Device (LB Dressing Goal 1, OT-IRF) lower body dressing  -HB     [place] (LB Dressing Goal 1, OT-IRF) minimum assist (75% or more patient effort)  -HB      Time Frame (LB Dressing Goal 1, OT-IRF) short-term goal (STG)  -HB     Progress/Outcomes (LB Dressing Goal 1, OT-IRF) good progress toward goal  -HB     Row Name 11/07/22 1149          LB Dressing Goal 2 (OT-IRF)    Activity/Device (LB Dressing Goal 2, OT-IRF) lower body dressing  -HB     [place] (LB Dressing Goal 2, OT-IRF) set-up required  -HB     Time Frame (LB Dressing Goal 2, OT-IRF) long-term goal (LTG)  -HB     Progress/Outcomes (LB Dressing Goal 2, OT-IRF) good progress toward goal  -HB     Row Name 11/07/22 1149          Toileting Goal 1 (OT-IRF)    Activity/Device (Toileting Goal 1, OT-IRF) toileting skills, all  -HB     [place] Level (Toileting Goal 1, OT-IRF) minimum assist (75% or more patient effort)  -HB     Progress/Outcomes (Toileting Goal 1, OT-IRF) good progress toward goal  -HB     Row Name 11/07/22 1149          Toileting Goal 2 (OT-IRF)    Activity/Device (Toileting Goal 2, OT-IRF) toileting skills, all  -HB     [place] Level (Toileting Goal 2, OT-IRF) set-up required  -HB     Progress/Outcomes (Toileting Goal 2, OT-IRF) good progress toward goal  -HB           User Key  (r) = Recorded By, (t) = Taken By, (c) = Cosigned By    Initials Name Effective Dates    HB Dorothy Rosas, OT 05/25/21 -                  Occupational Therapy Education     Title: PT OT SLP Therapies (Done)     Topic: Occupational Therapy (Done)     Point: ADL training (Done)     Description:   Instruct learner(s) on proper safety adaptation and remediation techniques during self care or transfers.   Instruct in proper use of assistive devices.              Learning Progress Summary           Patient Acceptance, E, VU,NR by  at 11/7/2022 1155    Acceptance, E,TB, VU by BRYSON at 11/6/2022 2023    Acceptance, E, VU,NR by  at 11/4/2022 1153    Acceptance, E, VU,NR by  at 11/3/2022 1428    Acceptance, E,TB, VU by BRYSON at 11/1/2022 2016    Acceptance, E,TB, VU by DG at 10/31/2022 2315    Acceptance, E, VU,NR by BF at  10/31/2022 1429    Acceptance, E,TB, VU by DL at 10/31/2022 0101    Acceptance, E,TB, VU by DL at 10/29/2022 2321    Acceptance, E, VU,NR by HB at 10/29/2022 1137                   Point: Home exercise program (Done)     Description:   Instruct learner(s) on appropriate technique for monitoring, assisting and/or progressing therapeutic exercises/activities.              Learning Progress Summary           Patient Acceptance, E, VU,NR by HB at 11/7/2022 1155    Acceptance, E,TB, VU by DG at 11/6/2022 2023    Acceptance, E, VU,NR by HB at 11/4/2022 1153    Acceptance, E, VU,NR by HB at 11/3/2022 1428    Acceptance, E,TB, VU by DG at 11/1/2022 2016    Acceptance, E,TB, VU by DG at 10/31/2022 2315    Acceptance, E,TB, VU by DL at 10/31/2022 0101    Acceptance, E,TB, VU by DL at 10/29/2022 2321    Acceptance, E, VU,NR by HB at 10/29/2022 1137                   Point: Precautions (Done)     Description:   Instruct learner(s) on prescribed precautions during self-care and functional transfers.              Learning Progress Summary           Patient Acceptance, E, VU,NR by HB at 11/7/2022 1155    Acceptance, E,TB, VU by DG at 11/6/2022 2023    Acceptance, E, VU,NR by HB at 11/4/2022 1153    Acceptance, E, VU,NR by HB at 11/3/2022 1428    Acceptance, E,TB, VU by DG at 11/1/2022 2016    Acceptance, E,TB, VU by DG at 10/31/2022 2315    Acceptance, E, VU,NR by BF at 10/31/2022 1429    Acceptance, E,TB, VU by DL at 10/31/2022 0101    Acceptance, E,TB, VU by DL at 10/29/2022 2321    Acceptance, E, VU,NR by HB at 10/29/2022 1137                   Point: Body mechanics (Done)     Description:   Instruct learner(s) on proper positioning and spine alignment during self-care, functional mobility activities and/or exercises.              Learning Progress Summary           Patient Acceptance, E, VU,NR by HB at 11/7/2022 1155    Acceptance, E,TB, VU by DG at 11/6/2022 2023    Acceptance, E, VU,NR by HB at 11/4/2022 1153    Acceptance,  E, VU,NR by HB at 11/3/2022 1428    Acceptance, E,TB, VU by DG at 11/1/2022 2016    Acceptance, E,TB, VU by DG at 10/31/2022 2315    Acceptance, E,TB, VU by DL at 10/31/2022 0101    Acceptance, E,TB, VU by DL at 10/29/2022 2321    Acceptance, E, VU,NR by HB at 10/29/2022 1137                               User Key     Initials Effective Dates Name Provider Type Discipline    DG 06/16/21 -  Phyllis Yañez, RN Registered Nurse Nurse     06/16/21 -  Mandi Smith OT Occupational Therapist OT    HB 05/25/21 -  Dorothy Rosas OT Occupational Therapist OT    DL 03/16/22 -  Hellen Mcdonnell, RN Registered Nurse Nurse                    OT Recommendation and Plan    Planned Therapy Interventions (OT): activity tolerance training, adaptive equipment training, BADL retraining, IADL retraining, patient/caregiver education/training, strengthening exercise, transfer/mobility retraining                    Time Calculation:      Time Calculation- OT     Row Name 11/07/22 1156             Time Calculation- OT    OT Start Time 1000  -HB      OT Stop Time 1045  -HB      OT Time Calculation (min) 45 min  -HB      Total Timed Code Minutes- OT 45 minute(s)  -HB            User Key  (r) = Recorded By, (t) = Taken By, (c) = Cosigned By    Initials Name Provider Type    HB Dorothy Rosas OT Occupational Therapist              Therapy Charges for Today     Code Description Service Date Service Provider Modifiers Qty    81788889851 HC OT SELF CARE/MGMT/TRAIN EA 15 MIN 11/7/2022 Dorothy Rosas OT GO 1    96388013020 HC OT THER PROC EA 15 MIN 11/7/2022 Dorothy Rosas OT GO 1    97982334937 HC OT THERAPEUTIC ACT EA 15 MIN 11/7/2022 Dorothy Rosas OT GO 1                   Dorothy Rosas OT  11/7/2022

## 2022-11-07 NOTE — PROGRESS NOTES
Inpatient Rehabilitation Functional Measures Assessment and Plan of Care    Plan of Care  Self Care    Dressing (Lower) (Active)  Current Status (11/6/2022 12:00:00 AM): min  Weekly Goal: SBA  Discharge Goal: SBA    Functional Measures  SHANNON Eating:  SHANNON Grooming:  SHANNON Bathing:  SHANNON Upper Body Dressing:  SHANNON Lower Body Dressing:  SHANNON Toileting:    SHANNON Bladder Management  Level of Assistance:  Frequency/Number of Accidents this Shift:    SHANNON Bowel Management  Level of Assistance:  Frequency/Number of Accidents this Shift:    SHANNON Bed/Chair/Wheelchair Transfer:  SHANNON Toilet Transfer:  SHANNON Tub/Shower Transfer:    Previously Documented Mode of Locomotion at Discharge:  SHANNON Expected Mode of Locomotion at Discharge:  SHANNON Walk/Wheelchair:  SHANNON Stairs:    SHANNON Comprehension:  SHANNON Expression:  SHANNON Social Interaction:  SHANNON Problem Solving:  SHANNON Memory:    Therapy Mode Minutes  Occupational Therapy:  Physical Therapy:  Speech Language Pathology:    Discharge Functional Goals:    Signed by: Darcy Gramajo OT

## 2022-11-07 NOTE — PROGRESS NOTES
Occupational Therapy: Individual: 45 minutes.    Physical Therapy:    Speech Language Pathology:    Signed by: Dorothy Rosas OT

## 2022-11-07 NOTE — THERAPY TREATMENT NOTE
Inpatient Rehabilitation - Physical Therapy Treatment Note       YVONNE Gaines     Patient Name: Melchor Hardwick III  : 1965  MRN: [id number]    Today's Date: 2022                    Admit Date: 10/28/2022      Visit Dx:     ICD-10-CM ICD-9-CM   1. Staphylococcal arthritis of right knee (HCC)  M00.061 711.06     041.10       Patient Active Problem List   Diagnosis   • Hyperlipidemia   • Hypertensive disorder   • Obesity   • Type 2 diabetes mellitus with hyperglycemia, with long-term current use of insulin (HCC)   • Gas gangrene of foot (HCC)   • Cellulitis in diabetic foot (HCC)   • Other acute osteomyelitis of left foot (HCC)   • Osteomyelitis (HCC)   • Critical illness myopathy   • Staphylococcal arthritis of right knee (HCC)   • Other cirrhosis of liver (HCC)   • MRSA bacteremia   • Endocarditis of tricuspid valve   • Wound of right buttock   • History of osteomyelitis   • Debility       Past Medical History:   Diagnosis Date   • Diabetes mellitus (HCC)    • Hyperlipidemia    • Hypertension    • Pyogenic arthritis of right knee joint, due to unspecified organism (HCC) 2022       Past Surgical History:   Procedure Laterality Date   • ABSCESS DRAINAGE      PERINEUM   • AMPUTATION FOOT Left 2022    Procedure: AMPUTATION FOOT;  Surgeon: Addison Colby MD;  Location: [facility] OR;  Service: Podiatry;  Laterality: Left;   • INCISION AND DRAINAGE LEG Left 05/10/2022    Procedure: INCISION AND DRAINAGE LOWER EXTREMITY;  Surgeon: Addison Colby MD;  Location:  [facility] OR;  Service: Podiatry;  Laterality: Left;   • KNEE INCISION AND DRAINAGE Right 2022    Procedure: KNEE INCISION AND DRAINAGE RIGHT;  Surgeon: Brain Bentley MD;  Location: [facility] OR;  Service: Orthopedics;  Laterality: Right;   • WOUND DEBRIDEMENT Left 2022    Procedure: DEBRIDEMENT FOOT;  Surgeon: Addison Colby MD;  Location: [facility] OR;  Service: Podiatry;  Laterality: Left;       PT ASSESSMENT (last 12 hours)     IRF PT  Evaluation and Treatment     Row Name 11/07/22 1554          PT Time and Intention    Document Type progress note;daily treatment  -RF     Mode of Treatment physical therapy  -RF     Patient/Family/Caregiver Comments/Observations Pt reports R knee and low back pain BID. Low back pain more severe in PM; mobility hindered as a result. Pt c/o dizziness with positional changes BID; RN aware.  -RF     Row Name 11/07/22 1554          General Information    Patient Profile Reviewed yes  -RF     Existing Precautions/Restrictions fall;brace worn when out of bed  aircast L LE  -RF     Limitations/Impairments other (see comments)  ortho hypo  -RF     Row Name 11/07/22 1554          Pain Assessment    Pretreatment Pain Rating 6/10  knee, low back  -RF     Posttreatment Pain Rating 8/10  -RF     Pain Location - back;knee  -RF     Row Name 11/07/22 1554          Cognition/Psychosocial    Affect/Mental Status (Cognition) WFL  -RF     Orientation Status (Cognition) oriented x 4  -RF     Follows Commands (Cognition) WFL  -RF     Personal Safety Interventions gait belt;nonskid shoes/slippers when out of bed;fall prevention program maintained  -RF     Row Name 11/07/22 155          Mobility    Extremity Weight-bearing Status left lower extremity;right lower extremity  -RF     Left Lower Extremity (Weight-bearing Status) weight-bearing as tolerated (WBAT);other (see comments)  with brace/air cast  -RF     Right Lower Extremity (Weight-bearing Status) weight-bearing as tolerated (WBAT);other (see comments)  -RF     Row Name 11/07/22 1554          Bed Mobility    Bed Mobility supine-sit;sit-supine;scooting/bridging;rolling left;rolling right  -RF     Rolling Left [place] (Bed Mobility) modified independence  -RF     Rolling Right [place] (Bed Mobility) modified independence  -RF     Scooting/Bridging [place] (Bed Mobility) modified independence  -RF     Supine-Sit [place] (Bed Mobility) minimum assist (75% patient  effort)  -RF     Assistive Device (Bed Mobility) bed rails  -RF     Row Name 11/07/22 1554          Bed-Chair Transfer    Bed-Chair [place] (Transfers) contact guard;standby assist  -RF     Row Name 11/07/22 1554          Chair-Bed Transfer    Chair-Bed [place] (Transfers) contact guard;standby assist  -RF     Assistive Device (Chair-Bed Transfers) wheelchair;walker, front-wheeled  -RF     Row Name 11/07/22 1554          Sit-Stand Transfer    Sit-Stand [place] (Transfers) nonverbal cues (demo/gesture);verbal cues;contact guard  -RF     Assistive Device (Sit-Stand Transfers) walker, front-wheeled  -RF     Row Name 11/07/22 1554          Stand-Sit Transfer    Stand-Sit [place] (Transfers) contact guard;verbal cues;nonverbal cues (demo/gesture)  -RF     Assistive Device (Stand-Sit Transfers) wheelchair;walker, front-wheeled  -RF     Row Name 11/07/22 1554          Gait/Stairs (Locomotion)    [place] Level (Gait) contact guard;nonverbal cues (demo/gesture);verbal cues  -RF     Assistive Device (Gait) parallel bars  -RF     Distance in Feet (Gait) 6' forward/backward  -RF     Right Sided Gait Deviations heel strike decreased;weight shift ability decreased  Decreased R knee extension  -RF     Comment, (Gait/Stairs) Increased knee flexion noted bilaterally; pt heavily reliant on UEs for support.  -RF     Row Name 11/07/22 1554          Hip (Therapeutic Exercise)    Hip Strengthening (Therapeutic Exercise) bilateral;flexion;extension;aBduction;aDduction;heel slides;marching while seated;sitting;resistance band;green;2 sets;10 repetitions  -RF     Row Name 11/07/22 1554          Knee (Therapeutic Exercise)    Knee AAROM (Therapeutic Exercise) right;flexion;extension;sitting;2 sets;10 repetitions;other (see comments)  OS with OP, ham flex with SB  -RF     Knee PROM (Therapeutic Exercise) right;extension;sitting;10 second hold;10 repetitions;other (see comments);flexion  BID  -RF     Knee Strengthening  (Therapeutic Exercise) bilateral;flexion;extension;heel slides;marching while seated;LAQ (long arc quad);hamstring curls;sitting;resistance band;green;2 sets;10 repetitions  -RF     Row Name 11/07/22 1554          Ankle (Therapeutic Exercise)    Ankle (Therapeutic Exercise) strengthening exercise  -RF     Ankle Strengthening (Therapeutic Exercise) bilateral;dorsiflexion;plantarflexion;sitting;2 sets;10 repetitions  -RF     Row Name 11/07/22 1553          Modality Intervention    Additional Documentation Modality Treatment (Group)  -RF     Row Name 11/07/22 1554          Modality Intervention (Comprehensive)    Modality Treatment cryotherapy  -RF     Additional Documentation Modality Treatment (Row)  -RF     Row Name 11/07/22 1556          Cryotherapy    Location 1 (Cryotherapy) lower extremity treatment area;other (see comments)  R knee  -RF     Indications (Cryotherapy) pain;joint pain/edema  -RF     Treatment Details (Cryotherapy) 20 mins  -RF     Response (Cryotherapy) pain decreased  -RF     Comment (Cryotherapy) No skin changes noted  -RF     Row Name 11/07/22 1552          Positioning and Restraints    Pre-Treatment Position sitting in chair/recliner  AM, bed in PM  -RF     In Wheelchair sitting;call light within reach;encouraged to call for assist  AM, with OT in PM  -RF     Row Name 11/07/22 2548          Vital Signs    Intra Systolic BP Rehab 82  immediately after standing  -RF     Intra Treatment Diastolic BP 60  -RF     Post Systolic BP Rehab 97  sitting  -RF     Post Treatment Diastolic BP 64  -RF     Row Name 11/07/22 0271          Therapy Assessment/Plan (PT)    Rehab Potential/Prognosis (PT) good, to achieve stated therapy goals  -RF     Frequency of Treatment (PT) 5 times per week;90 minutes per session  -RF     Estimated Duration of Therapy (PT) 2 weeks;3 weeks  -RF     Row Name 11/07/22 0693          Daily Progress Summary (PT)    Functional Goal Overall Progress (PT) progressing toward functional  goals slower than expected  -RF     Daily Progress Summary (PT) Pt hindered due to symptoms of orthostatic hypotension and low back pain this date. Pt unable to tolerate upright standing for increased time; c/o dizziness and low BP observed. Deficits in R knee ROM continually observed. Fair functional mobility and ambulation quality noted, however. Continued skilled care required for further improvements to ensure maxium safe function upon D/C.  -RF     Impairments Still Limiting Function (PT) flexibility decreased;functional activity tolerance impairment;pain;range of motion deficit;strength deficit  -RF     Recommendations (PT) Continue per current POC  -RF     Row Name 11/07/22 1554          Therapy Plan Review/Discharge Plan (PT)    Anticipated Discharge Disposition (PT) home with assist;home with supervision;home with home health  -RF     Row Name 11/07/22 1554          IRF PT Goals    Bed Mobility Goal Selection (PT-IRF) bed mobility, PT goal 1  -RF     Gait (Walking Locomotion) Goal Selection (PT-IRF) gait, PT goal 1  -RF     Row Name 11/07/22 1554          Bed Mobility Goal 1 (PT-IRF)    Activity/Assistive Device (Bed Mobility Goal 1, PT-IRF) bed mobility activities, all  -RF     [place] Level (Bed Mobility Goal 1, PT-IRF) independent  -RF     Time Frame (Bed Mobility Goal 1, PT-IRF) long-term goal (LTG)  -RF     Progress/Outcomes (Bed Mobility Goal 1, PT-IRF) goal met  -RF     Row Name 11/07/22 1554          Gait/Walking Locomotion Goal 1 (PT-IRF)    Activity/Assistive Device (Gait/Walking Locomotion Goal 1, PT-IRF) gait (walking locomotion);assistive device use  -RF     Gait/Walking Locomotion Distance Goal 1 (PT-IRF) 30'  -RF     [place] Level (Gait/Walking Locomotion Goal 1, PT-IRF) standby assist;modified independence  -RF     Time Frame (Gait/Walking Locomotion Goal 1, PT-IRF) long-term goal (LTG)  -RF     Progress/Outcomes (Gait/Walking Locomotion Goal 1, PT-IRF) goal ongoing  -RF            User Key  (r) = Recorded By, (t) = Taken By, (c) = Cosigned By    Initials Name Provider Type    RF Meron Lei, LIZETT Physical Therapist Assistant              Wound 09/21/22 1532 Right anterior knee Incision (Active)   Dressing Appearance open to air 11/07/22 1025   Closure Approximated 11/06/22 1907   Base dry;clean 11/07/22 1025   Drainage Amount none 11/06/22 1907       Wound 10/28/22 1827 Right coccyx Pressure Injury (Active)   Dressing Appearance open to air 11/07/22 1025   Base non-blanchable;red 11/07/22 1025   Drainage Amount none 11/07/22 1025   Care, Wound barrier applied 11/07/22 1025   Dressing Care open to air 11/07/22 1025     Physical Therapy Education     Title: PT OT SLP Therapies (Done)     Topic: Physical Therapy (Done)     Point: Mobility training (Done)     Learning Progress Summary           Patient Acceptance, E,TB, VU by  at 11/7/2022 1607    Acceptance, E,TB, VU by DG at 11/6/2022 2023    Acceptance, E,TB, VU by  at 11/4/2022 1551    Acceptance, E,TB, VU,DU by HR at 11/3/2022 1541    Acceptance, E,D, VU,NR by RG at 11/3/2022 1442    Acceptance, E,TB, VU by DG at 11/1/2022 2016    Acceptance, E,D, VU,NR by RG at 11/1/2022 1541    Acceptance, E,TB, VU by DG at 10/31/2022 2315    Acceptance, E,D, VU,NR by RG at 10/31/2022 1446                   Point: Home exercise program (Done)     Learning Progress Summary           Patient Acceptance, E,TB, VU by RF at 11/7/2022 1607    Acceptance, E,TB, VU by DG at 11/6/2022 2023    Acceptance, E,TB, VU by  at 11/4/2022 1551    Acceptance, E,TB, VU,DU by HR at 11/3/2022 1541    Acceptance, E,D, VU,NR by RG at 11/3/2022 1442    Acceptance, E,TB, VU by DG at 11/1/2022 2016    Acceptance, E,D, VU,NR by RG at 11/1/2022 1541    Acceptance, E,TB, VU by BRYSON at 10/31/2022 2315    Acceptance, E,D, VU,NR by RG at 10/31/2022 1446                   Point: Body mechanics (Done)     Learning Progress Summary           Patient Acceptance, E,TB, VU by RF at  11/7/2022 1607    Acceptance, E,TB, VU by DG at 11/6/2022 2023    Acceptance, E,TB, VU by  at 11/4/2022 1551    Acceptance, E,TB, VU,DU by HR at 11/3/2022 1541    Acceptance, E,D, VU,NR by RG at 11/3/2022 1442    Acceptance, E,TB, VU by DG at 11/1/2022 2016    Acceptance, E,D, VU,NR by RG at 11/1/2022 1541    Acceptance, E,TB, VU by DG at 10/31/2022 2315    Acceptance, E,D, VU,NR by RG at 10/31/2022 1446                   Point: Precautions (Done)     Learning Progress Summary           Patient Acceptance, E,TB, VU by RF at 11/7/2022 1607    Acceptance, E,TB, VU by DG at 11/6/2022 2023    Acceptance, E,TB, VU by  at 11/4/2022 1551    Acceptance, E,TB, VU,DU by HR at 11/3/2022 1541    Acceptance, E,D, VU,NR by RG at 11/3/2022 1442    Acceptance, E,TB, VU by DG at 11/1/2022 2016    Acceptance, E,D, VU,NR by RG at 11/1/2022 1541    Acceptance, E,TB, VU by  at 10/31/2022 2315    Acceptance, E,D, VU,NR by RG at 10/31/2022 1446                               User Key     Initials Effective Dates Name Provider Type Discipline     06/16/21 -  Phyllis Yañez RN Registered Nurse Nurse     06/16/21 -  Meron Lei PTA Physical Therapist Assistant PT     06/16/21 -  Michi Mckeon PTA Physical Therapist Assistant PT     01/14/22 -  Hanna Murphy PTA Physical Therapist Assistant PT                PT Recommendation and Plan    Frequency of Treatment (PT): 5 times per week, 90 minutes per session     Daily Progress Summary (PT)  Functional Goal Overall Progress (PT): progressing toward functional goals slower than expected  Daily Progress Summary (PT): Pt hindered due to symptoms of orthostatic hypotension and low back pain this date. Pt unable to tolerate upright standing for increased time; c/o dizziness and low BP observed. Deficits in R knee ROM continually observed. Fair functional mobility and ambulation quality noted, however. Continued skilled care required for further improvements to ensure maxium  safe function upon D/C.  Impairments Still Limiting Function (PT): flexibility decreased, functional activity tolerance impairment, pain, range of motion deficit, strength deficit  Recommendations (PT): Continue per current POC               Time Calculation:      PT Charges     Row Name 11/07/22 1608 11/07/22 1607          Time Calculation    Start Time 1330  -RF 1045  -RF     Stop Time 1415  -RF 1130  -RF     Time Calculation (min) 45 min  -RF 45 min  -RF     PT Received On 11/07/22  -RF 11/07/22  -RF     PT - Next Appointment 11/08/22  -RF 11/07/22  -RF     PT Goal Re-Cert Due Date 11/11/22  -RF 11/11/22  -RF        Time Calculation- PT    Total Timed Code Minutes- PT 45 minute(s)  -RF 45 minute(s)  -RF           User Key  (r) = Recorded By, (t) = Taken By, (c) = Cosigned By    Initials Name Provider Type    RF Meron Lei PTA Physical Therapist Assistant                Therapy Charges for Today     Code Description Service Date Service Provider Modifiers Qty    42767373222 HC GAIT TRAINING EA 15 MIN 11/7/2022 Meron Lei, LIZETT GP, CQ 1    48930726046 HC PT THER PROC EA 15 MIN 11/7/2022 Meron Lei PTA GP, CQ 3    45378559357 HC PT THERAPEUTIC ACT EA 15 MIN 11/7/2022 Meron Lei PTA GP, CQ 2                   Meron Lei PTA  11/7/2022

## 2022-11-07 NOTE — PROGRESS NOTES
Occupational Therapy: Individual: 45 minutes.    Physical Therapy:    Speech Language Pathology:    Signed by: Darcy Gramajo OT

## 2022-11-07 NOTE — PROGRESS NOTES
CC: Follow-up on generalized debility and orthostasis    Interview History/HPI: Patient states he is doing okay, he states he is feeling better when he is up, less dizziness although he is still getting some dizziness and today when he was up he was dizzy and his systolic blood pressure dropped into the 80s when standing.  It was in the 90s over 60s when sitting.  He states he is tolerating his diet, he states the scapular pain that he was previously describing on the left has resolved, he is getting some low back pain when he moves from a lying to sitting position but then when he gets up it gets better.  He states this actually has been going on for prolonged period of time.          Current Hospital Meds:  ascorbic acid, 500 mg, Oral, Daily  aspirin, 81 mg, Oral, Daily  DAPTOmycin, 6 mg/kg (Adjusted), Intravenous, Q24H  docusate sodium, 100 mg, Oral, BID  fludrocortisone, 50 mcg, Oral, Daily  fondaparinux, 2.5 mg, Subcutaneous, Q24H  Insulin Aspart, 0-9 Units, Subcutaneous, TID AC  insulin detemir, 20 Units, Subcutaneous, Q12H  iron polysaccharides, 150 mg, Oral, Daily  levothyroxine, 25 mcg, Oral, Q AM  midodrine, 10 mg, Oral, TID AC  multivitamin with minerals, 1 tablet, Oral, Daily  pantoprazole, 40 mg, Oral, Q AM  polyethylene glycol, 17 g, Oral, Daily  pregabalin, 100 mg, Oral, Q12H    Pharmacy Consult,         Vitals:    11/07/22 1202   BP: 94/59   Pulse: 97   Resp:    Temp:    SpO2:          Intake/Output Summary (Last 24 hours) at 11/7/2022 1442  Last data filed at 11/7/2022 0900  Gross per 24 hour   Intake 840 ml   Output 4500 ml   Net -3660 ml       EXAM: 97.6, 86, 18, room air saturation 98%.  Lungs have bilateral breath sounds are clear no rhonchi rales or wheezing, heart regular rate and rhythm without murmur or gallop mood is good, knee incisions well-healed, he has about 1+ edema of that lower extremity.      Diet Regular; Thin; Consistent Carbohydrate        LABS:     Lab Results (last 48  hours)     Procedure Component Value Units Date/Time    POC Glucose Once [[id number]]  (Abnormal) Collected: 11/07/22 1130    Specimen: Blood Updated: 11/07/22 1149     Glucose 156 mg/dL      Comment: Meter: [id number] : [id number] Penelope Dawson       POC Glucose Once [[id number]]  (Abnormal) Collected: 11/07/22 0833    Specimen: Blood Updated: 11/07/22 0839     Glucose 218 mg/dL      Comment: Meter: [id number] : [id number] lisa ashley       POC Glucose Once [[id number]]  (Normal) Collected: 11/07/22 0653    Specimen: Blood Updated: 11/07/22 0700     Glucose 85 mg/dL      Comment: Meter: [id number] : [id number] YAMILET GAMA       POC Glucose Once [[id number]]  (Abnormal) Collected: 11/06/22 2020    Specimen: Blood Updated: 11/06/22 2027     Glucose 282 mg/dL      Comment: Meter: [id number] : [id number] YAMILET GAMA       POC Glucose Once [[id number]]  (Abnormal) Collected: 11/06/22 1558    Specimen: Blood Updated: 11/06/22 1614     Glucose 201 mg/dL      Comment: Meter: [id number] : [id number] lisa ashley       POC Glucose Once [[id number]]  (Abnormal) Collected: 11/06/22 1054    Specimen: Blood Updated: 11/06/22 1116     Glucose 243 mg/dL      Comment: Meter: [id number] : [id number] lisa ashley       POC Glucose Once [[id number]]  (Abnormal) Collected: 11/06/22 0609    Specimen: Blood Updated: 11/06/22 0616     Glucose 132 mg/dL      Comment: Meter: [id number] : [id number] YAMILET AGMA       Basic Metabolic Panel [[id number]]  (Abnormal) Collected: 11/06/22 0032    Specimen: Blood Updated: 11/06/22 0457     Glucose 209 mg/dL      BUN 11 mg/dL      Creatinine 0.65 mg/dL      Sodium 135 mmol/L      Potassium 4.2 mmol/L      Chloride 102 mmol/L      CO2 21.2 mmol/L      Calcium 8.2 mg/dL      BUN/Creatinine Ratio 16.9     Anion Gap 11.8 mmol/L      eGFR 109.9 mL/min/1.73      Comment: National Kidney Foundation and American Society of Nephrology (ASN) Task Force recommended  calculation based on the Chronic Kidney Disease Epidemiology Collaboration (CKD-EPI) equation refit without adjustment for race.       Narrative:      GFR Normal >60  Chronic Kidney Disease <60  Kidney Failure <15      CK [[id number]]  (Normal) Collected: 11/06/22 0032    Specimen: Blood Updated: 11/06/22 0457     Creatine Kinase 29 U/L     POC Glucose Once [[id number]]  (Abnormal) Collected: 11/05/22 1637    Specimen: Blood Updated: 11/05/22 1643     Glucose 206 mg/dL      Comment: Meter: [id number] : [id number] NADJA RANGEL                  Radiology:    Imaging Results (Last 72 Hours)     ** No results found for the last 72 hours. **          Results for orders placed during the hospital encounter of 10/28/22    Adult Transesophageal Echo (TYRESE) W/ Cont if Necessary Per Protocol    Interpretation Summary  •  Indication for TYRESE -to rule out infective endocarditis in a patient with MRSA bacteremia.  •  Findings-  •  Left ventricular systolic function is normal. Estimated left ventricular EF = 60%  •  Left atrial appendage is well visualized and is free of thrombus.  •  Saline test was negative for the evidence of shunt in interatrial septum.  •  The tricuspid valve appeared normal in structure. There was a moderate sized echodense structure seen above  the posterior tricuspid leaflet but not attached to the leaflet. The appearance and location are not typical for regurgitation. This structure could be a normal variant. No evidence of tricuspid valve stenosis or significant regurgitations.  •  Other valves also appear normal in structure with no evidence of stenosis or significant regurgitations.  No vegetations seen on these valves.  •  There is no evidence of pericardial effusion.  •  Comment-  •  In view of presence of MRSA bacteremia, recommend to extend antibiotic therapy for possible infective endocarditis and repeat TYRESE in 3 months.      Assessment/Plan:   Generalized debility status post prolonged  hospitalization, patient is eager to participate in therapy although has been somewhat limited due to the orthostasis.  Patient is modified independent for bed mobility minimal assistance bed to chair sit to stand and stand to sit.  He continues to work on his transfer goals positioning goal and ADL goals.  Still awaiting PT evaluation today    Orthostasis, although improved on full dose midodrine he is still having some symptoms, I am going to add Florinef at 50 mcg/day, will follow.  I do not think he is a good candidate for JAKOB hose because of his recent incision and edema, he is not on a cardiac diet.    DVT prophylaxis, Arixtra, platelets were essentially stable on the fifth, I am going to recheck in the a.m.    Diabetes, some wide fluctuations on moderate dose sliding scale and is Levemir 20 twice daily.  Going to continue to monitor on current insulin therapy    Bacteremia, patient is completing a daptomycin course as recommended by infectious disease.  CPK normal on November 6.  Blood culture sterilized as of 10/22.  TYRESE done 10/31 tricuspid valve is normal but there was some echogenic structure noted above the posterior tricuspid leaflet.  Appearance and location not typical for regurgitation thought to possibly be a normal variant.  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  This is most likely etiology of his thrombocytopenia.    Hypothyroidism, continue Synthroid    Chronically elevated right hemidiaphragm    Ja Bowers MD

## 2022-11-07 NOTE — PROGRESS NOTES
PROGRESS NOTE         Patient Identification:  Name:  Melchor Hardwick III  Age:  57 y.o.  Sex:  male  :  1965  MRN:  [id number]  Visit Number:  [id number]  Primary Care Provider:  Missy Up APRN         LOS: 10 days       ----------------------------------------------------------------------------------------------------------------------  Subjective       Chief Complaints:    No chief complaint on file.        Interval History:      The patient is sitting up in bed on room air in no apparent distress.  Reports that he is feeling well today and denies any new complaints at this time.  Right knee is stiff, but healing well without surrounding erythema, edema, or warmth.  Plans to participate in physical therapy around 10 AM. Afebrile, no diarrhea.  No new labs today.    Review of Systems:    Constitutional: no fever, chills and night sweats.  Generalized fatigue.  Eyes: no eye drainage, itching or redness.  HEENT: no mouth sores, dysphagia or nose bleed.  Respiratory: no for shortness of breath, cough or production of sputum.  Cardiovascular: no chest pain, no palpitations, no orthopnea.  Gastrointestinal: no nausea, vomiting or diarrhea. No abdominal pain, hematemesis or rectal bleeding.  Genitourinary: no dysuria or polyuria.  Hematologic/lymphatic: no lymph node abnormalities, no easy bruising or easy bleeding.  Musculoskeletal: no muscle or joint pain.  Stiff right knee.  Skin: No rash and no itching.  Neurological: no loss of consciousness, no seizure, no headache.  Behavioral/Psych: no depression or suicidal ideation.  Endocrine: no hot flashes.  Immunologic: negative.    ----------------------------------------------------------------------------------------------------------------------      Objective       [facility] Meds:  ascorbic acid, 500 mg, Oral, Daily  aspirin, 81 mg, Oral, Daily  DAPTOmycin, 6 mg/kg (Adjusted), Intravenous, Q24H  docusate sodium, 100 mg, Oral,  BID  fondaparinux, 2.5 mg, Subcutaneous, Q24H  Insulin Aspart, 0-9 Units, Subcutaneous, TID AC  insulin detemir, 20 Units, Subcutaneous, Q12H  iron polysaccharides, 150 mg, Oral, Daily  levothyroxine, 25 mcg, Oral, Q AM  midodrine, 10 mg, Oral, TID AC  multivitamin with minerals, 1 tablet, Oral, Daily  pantoprazole, 40 mg, Oral, Q AM  polyethylene glycol, 17 g, Oral, Daily  pregabalin, 100 mg, Oral, Q12H      Pharmacy Consult,       ----------------------------------------------------------------------------------------------------------------------    Vital Signs:  Temp:  [98.1 °F (36.7 °C)] 98.1 °F (36.7 °C)  Heart Rate:  [75-89] 80  Resp:  [18] 18  BP: (115-133)/(68-73) 125/73  No data found.  SpO2 Percentage    11/05/22 1900 11/06/22 0955 11/06/22 1900   SpO2: 98% 98% 98%     SpO2:  [98 %] 98 %  on   ;   Device (Oxygen Therapy): room air    Body mass index is 28.34 kg/m².  Wt Readings from Last 3 Encounters:   11/01/22 89.6 kg (197 lb 8.5 oz)   09/23/22 98 kg (216 lb)   06/17/22 115 kg (254 lb)        Intake/Output Summary (Last 24 hours) at 11/7/2022 1029  Last data filed at 11/7/2022 0900  Gross per 24 hour   Intake 1080 ml   Output 4900 ml   Net -3820 ml     Diet Regular; Thin; Consistent Carbohydrate  ----------------------------------------------------------------------------------------------------------------------      Physical Exam:    Constitutional: Chronically ill-appearing male is sitting up in bed on room air in no apparent distress.   HENT:  Head: Normocephalic and atraumatic.  Mouth:  Moist mucous membranes.    Eyes:  Conjunctivae and EOM are normal.  No scleral icterus.  Neck:  Neck supple.  No JVD present.    Cardiovascular:  Normal rate, regular rhythm and normal heart sounds with no murmur. No edema.  Pulmonary/Chest:  No respiratory distress, no wheezes, no crackles, with normal breath sounds and good air movement.  Abdominal:  Soft.  Bowel sounds are normal.  No distension and no  tenderness.   Musculoskeletal:  No edema, no tenderness, and no deformity.  No swelling or redness of joints.  Neurological:  Alert and oriented to person, place, and time.  No facial droop.  No slurred speech.   Skin:  Skin is warm and dry.  No rash noted.  No pallor.  Right knee surgical incision with good healing and staples removed.  No surrounding erythema, edema, warmth, or drainage.  Psychiatric:  Normal mood and affect.  Behavior is normal.    ----------------------------------------------------------------------------------------------------------------------  Results from last 7 days   Lab Units 11/06/22  0032   CK TOTAL U/L 29           Results from last 7 days   Lab Units 11/05/22  0111   WBC 10*3/mm3 3.11*   HEMOGLOBIN g/dL 8.9*   HEMATOCRIT % 28.7*   MCV fL 83.2   MCHC g/dL 31.0*   PLATELETS 10*3/mm3 90*     Results from last 7 days   Lab Units 11/06/22 0032 11/05/22  0111   SODIUM mmol/L 135* 133*   POTASSIUM mmol/L 4.2 4.0   CHLORIDE mmol/L 102 101   CO2 mmol/L 21.2* 24.7   BUN mg/dL 11 11   CREATININE mg/dL 0.65* 0.73*   CALCIUM mg/dL 8.2* 8.3*   GLUCOSE mg/dL 209* 181*   Estimated Creatinine Clearance: 141.2 mL/min (A) (by C-G formula based on SCr of 0.65 mg/dL (L)).  No results found for: AMMONIA    Glucose   Date/Time Value Ref Range Status   11/07/2022 0833 218 (H) 70 - 130 mg/dL Final     Comment:     Meter: [id number] : [id number] lisa ramirez   11/07/2022 0653 85 70 - 130 mg/dL Final     Comment:     Meter: [id number] : [id number] YAMILET GAMA   11/06/2022 2020 282 (H) 70 - 130 mg/dL Final     Comment:     Meter: [id number] : [id number] YAMILET GAMA   11/06/2022 1558 201 (H) 70 - 130 mg/dL Final     Comment:     Meter: [id number] : [id number] lisa ramirez   11/06/2022 1054 243 (H) 70 - 130 mg/dL Final     Comment:     Meter: [id number] : [id number] lisa ramirez   11/06/2022 0609 132 (H) 70 - 130 mg/dL Final     Comment:     Meter: [id number] : [id number]  YAMILET GAMA   11/05/2022 1637 206 (H) 70 - 130 mg/dL Final     Comment:     Meter: [id number] : [id number] NADJA RANGEL   11/05/2022 1057 191 (H) 70 - 130 mg/dL Final     Comment:     Meter: [id number] : [id number] NADJA RANGEL     Lab Results   Component Value Date    HGBA1C 8.80 (H) 09/20/2022     Lab Results   Component Value Date    TSH 2.390 10/10/2022    FREET4 0.87 (L) 10/10/2022       Blood Culture   Date Value Ref Range Status   10/24/2022 No growth at 5 days  Final   10/24/2022 No growth at 5 days  Final     No results found for: URINECX  No results found for: WOUNDCX  No results found for: STOOLCX  No results found for: RESPCX  Pain Management Panel       Pain Management Panel Latest Ref Rng & Units 5/24/2022 5/11/2022    CREATININE UR mg/dL 91.0 -    AMPHETAMINES SCREEN, URINE Negative - Negative    BARBITURATES SCREEN Negative - Negative    BENZODIAZEPINE SCREEN, URINE Negative - Negative    BUPRENORPHINEUR Negative - Negative    COCAINE SCREEN, URINE Negative - Negative    METHADONE SCREEN, URINE Negative - Negative    METHAMPHETAMINEUR Negative - Negative              ----------------------------------------------------------------------------------------------------------------------  Imaging Results (Last 24 Hours)       ** No results found for the last 24 hours. **            ----------------------------------------------------------------------------------------------------------------------    Pertinent Infectious Disease Results    CPK on 11/6/2022 remains normal at 29.  COVID-19 PCR from 10/28/2022 negative.  Blood cultures from 10/18/2022 2 out of 2 sets positive for MRSA.  Blood cultures from 10/20/2022 2 out of 2 sets positive for MRSA.  Blood cultures from 10/22/2022 1 out of 2 sets positive for MRSA.  Blood cultures from 10/24/2022 finalized as no growth.  2D echo from 10/24/2022 reports no evidence of vegetation.  Updated TYRESE on 11/1/2022 showed a moderate sized echodense  structure on the posterior tricuspid leaflet but not attached to the leaflet.  The appearance and location are not typical for regurgitation.  This structure could be a normal variant.  No evidence of tricuspid valve stenosis or significant regurgitations.  Other valves also appear normal in structure with no evidence of stenosis or significant regurgitations.  No vegetation seen on these valves.  Recommendation was made to extend antibiotic therapy for possible infective endocarditis and repeat TYRESE in 3 months.  Assessment/Plan       Assessment     Right knee septic arthritis  MRSA bacteremia  Possible endocarditis    Plan      I examined the patient this morning and he remains on room air in no apparent distress.  Reports no issues overnight and denies any new complaints at this time.  His right knee is stiff, but healing well without surrounding erythema, edema, or warmth.  Plans participate in physical therapy later today.  Lungs are clear to auscultation bilaterally.  Abdomen is soft, nontender, with normal bowel sounds.  Remains afebrile without diarrhea.  No new labs today.    Recommend to continue on daptomycin through 12/5/2022 for a 6-week course of IV antibiotic therapy based on TYRESE results and discussion with Dr. Gibson.     Code Status:   Code Status and Medical Interventions:   Ordered at: 11/03/22 1015     Level Of Support Discussed With:    Patient     Code Status (Patient has no pulse and is not breathing):    CPR (Attempt to Resuscitate)     Medical Interventions (Patient has pulse or is breathing):    Full Support     Release to patient:    Routine Release       Sara Sales PA-C  11/07/22  10:29 EST

## 2022-11-07 NOTE — PROGRESS NOTES
Occupational Therapy: Individual: 90 minutes.    Physical Therapy:    Speech Language Pathology:    Signed by: Jaja Ramirez, Supervisor

## 2022-11-07 NOTE — THERAPY TREATMENT NOTE
Inpatient Rehabilitation - Occupational Therapy Treatment Note    YVONNE Gaines     Patient Name: Melchor Hardwick III  : 1965  MRN: [id number]    Today's Date: 2022                 Admit Date: 10/28/2022         ICD-10-CM ICD-9-CM   1. Staphylococcal arthritis of right knee (HCC)  M00.061 711.06     041.10       Patient Active Problem List   Diagnosis   • Hyperlipidemia   • Hypertensive disorder   • Obesity   • Type 2 diabetes mellitus with hyperglycemia, with long-term current use of insulin (HCC)   • Gas gangrene of foot (HCC)   • Cellulitis in diabetic foot (HCC)   • Other acute osteomyelitis of left foot (HCC)   • Osteomyelitis (HCC)   • Critical illness myopathy   • Staphylococcal arthritis of right knee (HCC)   • Other cirrhosis of liver (HCC)   • MRSA bacteremia   • Endocarditis of tricuspid valve   • Wound of right buttock   • History of osteomyelitis   • Debility       Past Medical History:   Diagnosis Date   • Diabetes mellitus (HCC)    • Hyperlipidemia    • Hypertension    • Pyogenic arthritis of right knee joint, due to unspecified organism (HCC) 2022       Past Surgical History:   Procedure Laterality Date   • ABSCESS DRAINAGE      PERINEUM   • AMPUTATION FOOT Left 2022    Procedure: AMPUTATION FOOT;  Surgeon: Addison Colby MD;  Location: [facility] OR;  Service: Podiatry;  Laterality: Left;   • INCISION AND DRAINAGE LEG Left 05/10/2022    Procedure: INCISION AND DRAINAGE LOWER EXTREMITY;  Surgeon: Addison Colby MD;  Location: [facility] OR;  Service: Podiatry;  Laterality: Left;   • KNEE INCISION AND DRAINAGE Right 2022    Procedure: KNEE INCISION AND DRAINAGE RIGHT;  Surgeon: Brain Bentley MD;  Location: [facility] OR;  Service: Orthopedics;  Laterality: Right;   • WOUND DEBRIDEMENT Left 2022    Procedure: DEBRIDEMENT FOOT;  Surgeon: Addison Colby MD;  Location: [facility] OR;  Service: Podiatry;  Laterality: Left;             IRF OT ASSESSMENT FLOWSHEET (last 12 hours)      IRF OT Evaluation and Treatment     Row Name 11/07/22 1509 11/07/22 1149       OT Time and Intention    Document Type daily treatment  -KP daily treatment  -HB    Mode of Treatment individual therapy;occupational therapy  -KP individual therapy;occupational therapy  -HB    Total Minutes, Occupational Therapy 45  -KP 45  -HB    Patient Effort good  -KP good  -HB    Symptoms Noted During/After Treatment none  -KP none  -HB    Row Name 11/07/22 1509 11/07/22 1149       General Information    Patient Profile Reviewed yes  -KP yes  -HB    Patient/Family/Caregiver Comments/Observations -- Patient and RN okd OT this date.  -HB    General Observations of Patient Patient agreeable with no complaints voiced.  -KP Patient tolerated OT well with no adverse reactions.  -HB    Existing Precautions/Restrictions fall;brace worn when out of bed  -KP fall;brace worn when out of bed  LLE  -HB    Limitations/Impairments other (see comments)  orthostatic hypotension  -KP --    Row Name 11/07/22 1509 11/07/22 1149       Pain Assessment    Pretreatment Pain Rating 2/10  -KP 3/10  -HB    Posttreatment Pain Rating 1/10  -KP 3/10  -HB    Pain Location - back  -KP back  -HB    Row Name 11/07/22 1509 11/07/22 1149       Cognition/Psychosocial    Affect/Mental Status (Cognition) WFL  -KP WFL  -HB    Orientation Status (Cognition) oriented x 4  -KP oriented x 4  -HB    Follows Commands (Cognition) WFL  -KP WFL  -HB    Row Name 11/07/22 1149          Upper Body Dressing    [place]borne Level (Upper Body Dressing) upper body dressing skills;pull over garment;set up assistance  -HB     Position (Upper Body Dressing) supine  -HB     Row Name 11/07/22 1149          Grooming    [place] Level (Grooming) grooming skills;set up  -HB     Position (Grooming) supine  -HB     Row Name 11/07/22 1509 11/07/22 1149       Bed Mobility    Supine-Sit Osborne (Bed Mobility) -- modified independence  -HB    Sit-Supine [place] (Bed Mobility) minimum  assist (75% patient effort)  - --    Row Name 11/07/22 1149          Bed-Chair Transfer    Bed-Chair Nolan (Transfers) minimum assist (75% patient effort);nonverbal cues (demo/gesture);verbal cues;contact guard  -     Assistive Device (Bed-Chair Transfers) walker, front-wheeled;wheelchair  -     Row Name 11/07/22 1509          Chair-Bed Transfer    Chair-Bed Nolan (Transfers) minimum assist (75% patient effort);contact guard  -     Assistive Device (Chair-Bed Transfers) wheelchair;walker, front-wheeled  -     Row Name 11/07/22 1149          Sit-Stand Transfer    Sit-Stand Nolan (Transfers) minimum assist (75% patient effort);nonverbal cues (demo/gesture);verbal cues  -     Assistive Device (Sit-Stand Transfers) walker, front-wheeled  -     Row Name 11/07/22 1149          Stand-Sit Transfer    Stand-Sit Nolan (Transfers) minimum assist (75% patient effort);nonverbal cues (demo/gesture);verbal cues  -     Assistive Device (Stand-Sit Transfers) wheelchair;walker, front-wheeled  -     Row Name 11/07/22 1509 11/07/22 1149       Motor Skills    Motor Skills -- coordination;functional endurance;motor control/coordination interventions  -    Functional Endurance fair plus  - --    Motor Control/Coordination Interventions therapeutic exercise/ROM;fine motor manipulation/dexterity activities  functional reach activity sitting with BUE to complete FMC activity  - fine motor manipulation/dexterity activities;therapeutic exercise/ROM;gross motor coordination activities  -    Therapeutic Exercise -- shoulder;elbow/forearm;wrist;hand  -    Additional Documentation -- --  rickshaw 10lbs 4 sets of 20 reps; PRE 3x 3min; wrist rolls; rest between tasks  -    Row Name 11/07/22 1509 11/07/22 1149       Positioning and Restraints    Pre-Treatment Position sitting in chair/recliner  - in bed  -    Post Treatment Position bed  - wheelchair  -    In Bed call light within  reach;supine;fowlers  -KP with PT  -HB    Row Name 11/07/22 1149          Transfer Goal 1 (OT-IRF)    Activity/Assistive Device (Transfer Goal 1, OT-IRF) toilet  -HB     [place] Level (Transfer Goal 1, OT-IRF) contact guard required  -HB     Time Frame (Transfer Goal 1, OT-IRF) short-term goal (STG)  -HB     Progress/Outcomes (Transfer Goal 1, OT-IRF) good progress toward goal  -HB     Row Name 11/07/22 1149          Transfer Goal 2 (OT-IRF)    Activity/Assistive Device (Transfer Goal 2, OT-IRF) toilet  -HB     [place] Level (Transfer Goal 2, OT-IRF) supervision required  -HB     Time Frame (Transfer Goal 2, OT-IRF) long-term goal (LTG)  -HB     Progress/Outcomes (Transfer Goal 2, OT-IRF) good progress toward goal  -HB     Row Name 11/07/22 1149          LB Dressing Goal 1 (OT-IRF)    Activity/Device (LB Dressing Goal 1, OT-IRF) lower body dressing  -HB     [place] (LB Dressing Goal 1, OT-IRF) minimum assist (75% or more patient effort)  -HB     Time Frame (LB Dressing Goal 1, OT-IRF) short-term goal (STG)  -HB     Progress/Outcomes (LB Dressing Goal 1, OT-IRF) good progress toward goal  -HB     Row Name 11/07/22 1149          LB Dressing Goal 2 (OT-IRF)    Activity/Device (LB Dressing Goal 2, OT-IRF) lower body dressing  -HB     [place] (LB Dressing Goal 2, OT-IRF) set-up required  -HB     Time Frame (LB Dressing Goal 2, OT-IRF) long-term goal (LTG)  -HB     Progress/Outcomes (LB Dressing Goal 2, OT-IRF) good progress toward goal  -HB     Row Name 11/07/22 1149          Toileting Goal 1 (OT-IRF)    Activity/Device (Toileting Goal 1, OT-IRF) toileting skills, all  -HB     [place] Level (Toileting Goal 1, OT-IRF) minimum assist (75% or more patient effort)  -HB     Progress/Outcomes (Toileting Goal 1, OT-IRF) good progress toward goal  -HB     Row Name 11/07/22 1149          Toileting Goal 2 (OT-IRF)    Activity/Device (Toileting Goal 2, OT-IRF) toileting skills, all  -HB     [place]  Level (Toileting Goal 2, OT-IRF) set-up required  -HB     Progress/Outcomes (Toileting Goal 2, OT-IRF) good progress toward goal  -HB           User Key  (r) = Recorded By, (t) = Taken By, (c) = Cosigned By    Initials Name Effective Dates    KP Darcy Gramajo, OT 06/16/21 -     HB Dorothy Rosas, OT 05/25/21 -                  Occupational Therapy Education     Title: PT OT SLP Therapies (Done)     Topic: Occupational Therapy (Done)     Point: ADL training (Done)     Description:   Instruct learner(s) on proper safety adaptation and remediation techniques during self care or transfers.   Instruct in proper use of assistive devices.              Learning Progress Summary           Patient Acceptance, E, VU,NR by HB at 11/7/2022 1155    Acceptance, E,TB, VU by DG at 11/6/2022 2023    Acceptance, E, VU,NR by HB at 11/4/2022 1153    Acceptance, E, VU,NR by HB at 11/3/2022 1428    Acceptance, E,TB, VU by DG at 11/1/2022 2016    Acceptance, E,TB, VU by DG at 10/31/2022 2315    Acceptance, E, VU,NR by BF at 10/31/2022 1429    Acceptance, E,TB, VU by DL at 10/31/2022 0101    Acceptance, E,TB, VU by DL at 10/29/2022 2321    Acceptance, E, VU,NR by HB at 10/29/2022 1137                   Point: Home exercise program (Done)     Description:   Instruct learner(s) on appropriate technique for monitoring, assisting and/or progressing therapeutic exercises/activities.              Learning Progress Summary           Patient Acceptance, E, VU,NR by HB at 11/7/2022 1155    Acceptance, E,TB, VU by DG at 11/6/2022 2023    Acceptance, E, VU,NR by HB at 11/4/2022 1153    Acceptance, E, VU,NR by HB at 11/3/2022 1428    Acceptance, E,TB, VU by DG at 11/1/2022 2016    Acceptance, E,TB, VU by DG at 10/31/2022 2315    Acceptance, E,TB, VU by DL at 10/31/2022 0101    Acceptance, E,TB, VU by DL at 10/29/2022 2321    Acceptance, E, VU,NR by CLARITZA at 10/29/2022 1137                   Point: Precautions (Done)     Description:   Instruct learner(s)  on prescribed precautions during self-care and functional transfers.              Learning Progress Summary           Patient Acceptance, E, VU,NR by HB at 11/7/2022 1155    Acceptance, E,TB, VU by DG at 11/6/2022 2023    Acceptance, E, VU,NR by HB at 11/4/2022 1153    Acceptance, E, VU,NR by HB at 11/3/2022 1428    Acceptance, E,TB, VU by DG at 11/1/2022 2016    Acceptance, E,TB, VU by DG at 10/31/2022 2315    Acceptance, E, VU,NR by BF at 10/31/2022 1429    Acceptance, E,TB, VU by DL at 10/31/2022 0101    Acceptance, E,TB, VU by DL at 10/29/2022 2321    Acceptance, E, VU,NR by HB at 10/29/2022 1137                   Point: Body mechanics (Done)     Description:   Instruct learner(s) on proper positioning and spine alignment during self-care, functional mobility activities and/or exercises.              Learning Progress Summary           Patient Acceptance, E, VU,NR by HB at 11/7/2022 1155    Acceptance, E,TB, VU by DG at 11/6/2022 2023    Acceptance, E, VU,NR by HB at 11/4/2022 1153    Acceptance, E, VU,NR by HB at 11/3/2022 1428    Acceptance, E,TB, VU by DG at 11/1/2022 2016    Acceptance, E,TB, VU by DG at 10/31/2022 2315    Acceptance, E,TB, VU by DL at 10/31/2022 0101    Acceptance, E,TB, VU by DL at 10/29/2022 2321    Acceptance, E, VU,NR by HB at 10/29/2022 1137                               User Key     Initials Effective Dates Name Provider Type Discipline    DG 06/16/21 -  Phyllis Yañez, RN Registered Nurse Nurse    BF 06/16/21 -  Mandi Smith OT Occupational Therapist OT    HB 05/25/21 -  Dorothy Rosas OT Occupational Therapist OT    DL 03/16/22 -  Hellen Mcdonnell, RN Registered Nurse Nurse                    OT Recommendation and Plan            Daily Progress Summary (OT)  Overall Progress Toward Functional Goals (OT): progressing toward functional goals as expected            Time Calculation:      Time Calculation- OT     Row Name 11/07/22 1515 11/07/22 1156          Time Calculation-  OT    OT Start Time 1415  -KP 1000  -     OT Stop Time 1500  -KP 1045  -HB     OT Time Calculation (min) 45 min  -KP 45 min  -     Total Timed Code Minutes- OT 45 minute(s)  -KP 45 minute(s)  -HB           User Key  (r) = Recorded By, (t) = Taken By, (c) = Cosigned By    Initials Name Provider Type     Darcy Gramajo OT Occupational Therapist     Dorothy Rosas OT Occupational Therapist              Therapy Charges for Today     Code Description Service Date Service Provider Modifiers Qty    [id number]  OT THERAPEUTIC ACT EA 15 MIN 11/7/2022 Darcy Gramajo OT GO 1    [id number]  OT THER PROC EA 15 MIN 11/7/2022 Darcy Gramajo OT GO 2                   Darcy Gramajo OT  11/7/2022

## 2022-11-08 LAB
ANION GAP SERPL CALCULATED.3IONS-SCNC: 8.9 MMOL/L (ref 5–15)
BASOPHILS # BLD AUTO: 0 10*3/MM3 (ref 0–0.2)
BASOPHILS NFR BLD AUTO: 0 % (ref 0–1.5)
BUN SERPL-MCNC: 14 MG/DL (ref 6–20)
BUN/CREAT SERPL: 17.9 (ref 7–25)
CALCIUM SPEC-SCNC: 8.3 MG/DL (ref 8.6–10.5)
CHLORIDE SERPL-SCNC: 99 MMOL/L (ref 98–107)
CO2 SERPL-SCNC: 25.1 MMOL/L (ref 22–29)
CREAT SERPL-MCNC: 0.78 MG/DL (ref 0.76–1.27)
DEPRECATED RDW RBC AUTO: 57.5 FL (ref 37–54)
EGFRCR SERPLBLD CKD-EPI 2021: 104 ML/MIN/1.73
EOSINOPHIL # BLD AUTO: 0.07 10*3/MM3 (ref 0–0.4)
EOSINOPHIL NFR BLD AUTO: 2.3 % (ref 0.3–6.2)
ERYTHROCYTE [DISTWIDTH] IN BLOOD BY AUTOMATED COUNT: 18.6 % (ref 12.3–15.4)
GLUCOSE BLDC GLUCOMTR-MCNC: 199 MG/DL (ref 70–130)
GLUCOSE BLDC GLUCOMTR-MCNC: 204 MG/DL (ref 70–130)
GLUCOSE BLDC GLUCOMTR-MCNC: 207 MG/DL (ref 70–130)
GLUCOSE BLDC GLUCOMTR-MCNC: 261 MG/DL (ref 70–130)
GLUCOSE SERPL-MCNC: 257 MG/DL (ref 65–99)
HCT VFR BLD AUTO: 28.5 % (ref 37.5–51)
HGB BLD-MCNC: 8.8 G/DL (ref 13–17.7)
IMM GRANULOCYTES # BLD AUTO: 0.01 10*3/MM3 (ref 0–0.05)
IMM GRANULOCYTES NFR BLD AUTO: 0.3 % (ref 0–0.5)
LYMPHOCYTES # BLD AUTO: 0.87 10*3/MM3 (ref 0.7–3.1)
LYMPHOCYTES NFR BLD AUTO: 28.1 % (ref 19.6–45.3)
MCH RBC QN AUTO: 26.3 PG (ref 26.6–33)
MCHC RBC AUTO-ENTMCNC: 30.9 G/DL (ref 31.5–35.7)
MCV RBC AUTO: 85.3 FL (ref 79–97)
MONOCYTES # BLD AUTO: 0.28 10*3/MM3 (ref 0.1–0.9)
MONOCYTES NFR BLD AUTO: 9 % (ref 5–12)
NEUTROPHILS NFR BLD AUTO: 1.87 10*3/MM3 (ref 1.7–7)
NEUTROPHILS NFR BLD AUTO: 60.3 % (ref 42.7–76)
NRBC BLD AUTO-RTO: 0 /100 WBC (ref 0–0.2)
PLATELET # BLD AUTO: 110 10*3/MM3 (ref 140–450)
PMV BLD AUTO: 11.8 FL (ref 6–12)
POTASSIUM SERPL-SCNC: 4.2 MMOL/L (ref 3.5–5.2)
RBC # BLD AUTO: 3.34 10*6/MM3 (ref 4.14–5.8)
SODIUM SERPL-SCNC: 133 MMOL/L (ref 136–145)
WBC NRBC COR # BLD: 3.1 10*3/MM3 (ref 3.4–10.8)

## 2022-11-08 PROCEDURE — 97110 THERAPEUTIC EXERCISES: CPT

## 2022-11-08 PROCEDURE — 97116 GAIT TRAINING THERAPY: CPT

## 2022-11-08 PROCEDURE — 97530 THERAPEUTIC ACTIVITIES: CPT

## 2022-11-08 PROCEDURE — 25010000002 DAPTOMYCIN PER 1 MG: Performed by: FAMILY MEDICINE

## 2022-11-08 PROCEDURE — 99232 SBSQ HOSP IP/OBS MODERATE 35: CPT | Performed by: INTERNAL MEDICINE

## 2022-11-08 PROCEDURE — 97535 SELF CARE MNGMENT TRAINING: CPT

## 2022-11-08 PROCEDURE — 63710000001 INSULIN DETEMIR PER 5 UNITS: Performed by: INTERNAL MEDICINE

## 2022-11-08 PROCEDURE — 25010000002 FONDAPARINUX PER 0.5 MG: Performed by: FAMILY MEDICINE

## 2022-11-08 PROCEDURE — 63710000001 INSULIN ASPART PER 5 UNITS: Performed by: INTERNAL MEDICINE

## 2022-11-08 PROCEDURE — 85025 COMPLETE CBC W/AUTO DIFF WBC: CPT | Performed by: INTERNAL MEDICINE

## 2022-11-08 PROCEDURE — 82962 GLUCOSE BLOOD TEST: CPT

## 2022-11-08 PROCEDURE — 80048 BASIC METABOLIC PNL TOTAL CA: CPT | Performed by: INTERNAL MEDICINE

## 2022-11-08 RX ORDER — INSULIN ASPART 100 [IU]/ML
0-14 INJECTION, SOLUTION INTRAVENOUS; SUBCUTANEOUS
Status: DISCONTINUED | OUTPATIENT
Start: 2022-11-08 | End: 2022-11-30 | Stop reason: HOSPADM

## 2022-11-08 RX ORDER — NICOTINE POLACRILEX 4 MG
15 LOZENGE BUCCAL
Status: DISCONTINUED | OUTPATIENT
Start: 2022-11-08 | End: 2022-11-30 | Stop reason: HOSPADM

## 2022-11-08 RX ORDER — DEXTROSE MONOHYDRATE 25 G/50ML
25 INJECTION, SOLUTION INTRAVENOUS
Status: DISCONTINUED | OUTPATIENT
Start: 2022-11-08 | End: 2022-11-30 | Stop reason: HOSPADM

## 2022-11-08 RX ADMIN — PANTOPRAZOLE SODIUM 40 MG: 40 TABLET, DELAYED RELEASE ORAL at 05:57

## 2022-11-08 RX ADMIN — INSULIN ASPART 5 UNITS: 100 INJECTION, SOLUTION INTRAVENOUS; SUBCUTANEOUS at 09:13

## 2022-11-08 RX ADMIN — INSULIN DETEMIR 23 UNITS: 100 INJECTION, SOLUTION SUBCUTANEOUS at 21:36

## 2022-11-08 RX ADMIN — CARBIDOPA AND LEVODOPA 10 MG: 50; 200 TABLET, EXTENDED RELEASE ORAL at 17:39

## 2022-11-08 RX ADMIN — POLYETHYLENE GLYCOL (3350) 17 G: 17 POWDER, FOR SOLUTION ORAL at 09:12

## 2022-11-08 RX ADMIN — HYDROCODONE BITARTRATE AND ACETAMINOPHEN 1 TABLET: 5; 325 TABLET ORAL at 21:36

## 2022-11-08 RX ADMIN — DAPTOMYCIN 500 MG: 500 INJECTION, POWDER, LYOPHILIZED, FOR SOLUTION INTRAVENOUS at 18:37

## 2022-11-08 RX ADMIN — PREGABALIN 100 MG: 100 CAPSULE ORAL at 21:36

## 2022-11-08 RX ADMIN — CARBIDOPA AND LEVODOPA 10 MG: 50; 200 TABLET, EXTENDED RELEASE ORAL at 06:36

## 2022-11-08 RX ADMIN — OXYCODONE HYDROCHLORIDE AND ACETAMINOPHEN 500 MG: 500 TABLET ORAL at 09:12

## 2022-11-08 RX ADMIN — Medication 150 MG: at 09:11

## 2022-11-08 RX ADMIN — ASPIRIN 81 MG: 81 TABLET, COATED ORAL at 09:11

## 2022-11-08 RX ADMIN — INSULIN DETEMIR 23 UNITS: 100 INJECTION, SOLUTION SUBCUTANEOUS at 09:13

## 2022-11-08 RX ADMIN — FONDAPARINUX SODIUM 2.5 MG: 2.5 INJECTION, SOLUTION SUBCUTANEOUS at 09:12

## 2022-11-08 RX ADMIN — VITAMINS A AND D OINTMENT 1 APPLICATION: 15.5; 53.4 OINTMENT TOPICAL at 21:36

## 2022-11-08 RX ADMIN — PREGABALIN 100 MG: 100 CAPSULE ORAL at 09:18

## 2022-11-08 RX ADMIN — CARBIDOPA AND LEVODOPA 10 MG: 50; 200 TABLET, EXTENDED RELEASE ORAL at 12:35

## 2022-11-08 RX ADMIN — LEVOTHYROXINE SODIUM 25 MCG: 0.07 TABLET ORAL at 05:57

## 2022-11-08 RX ADMIN — Medication 1 TABLET: at 09:11

## 2022-11-08 RX ADMIN — DOCUSATE SODIUM 100 MG: 100 CAPSULE ORAL at 09:11

## 2022-11-08 RX ADMIN — DOCUSATE SODIUM 100 MG: 100 CAPSULE ORAL at 21:36

## 2022-11-08 RX ADMIN — INSULIN ASPART 5 UNITS: 100 INJECTION, SOLUTION INTRAVENOUS; SUBCUTANEOUS at 12:36

## 2022-11-08 RX ADMIN — FLUDROCORTISONE ACETATE 50 MCG: 0.1 TABLET ORAL at 09:11

## 2022-11-08 RX ADMIN — INSULIN ASPART 3 UNITS: 100 INJECTION, SOLUTION INTRAVENOUS; SUBCUTANEOUS at 17:36

## 2022-11-08 RX ADMIN — HYDROCODONE BITARTRATE AND ACETAMINOPHEN 1 TABLET: 5; 325 TABLET ORAL at 10:43

## 2022-11-08 NOTE — PROGRESS NOTES
Patient was seen with RN extern.  Although patient is systolic blood pressure did drop from about 1  with standing he did not feel dizzy and felt better.  I did start him on Florinef yesterday after having ongoing orthostasis he is already on max dose of midodrine.  He wears a boot over his transmetatarsal ambulation to ambulate.  Patient continues to be eager to work with therapy.  Patient today was able to walk 6 feet forward and backward in parallel bars and 40 feet with a rolling walker contact-guard.  Labs are stable.  He continues on daptomycin therapy for MRSA septic arthritis of his right knee and bacteremia.  Cultures cleared on the 22nd.  Per my discussion with infectious disease this a.m., I discussed with Dr. Farfan who is going to review the TYRESE for a second opinion.  Currently on daptomycin through November 20.  CPK normal 11/

## 2022-11-08 NOTE — PROGRESS NOTES
PROGRESS NOTE         Patient Identification:  Name:  Melchor Hardwick III  Age:  57 y.o.  Sex:  male  :  1965  MRN:  [id number]  Visit Number:  [id number]  Primary Care Provider:  Missy Up APRN         LOS: 11 days       ----------------------------------------------------------------------------------------------------------------------  Subjective       Chief Complaints:    No chief complaint on file.        Interval History:      The patient is sitting up in bed on room air in no apparent distress.  No new issues or complaints at this time.  Afebrile, no diarrhea.  White count remains low but is stable at 3.10.    Review of Systems:    Constitutional: no fever, chills and night sweats.  Generalized fatigue.  Eyes: no eye drainage, itching or redness.  HEENT: no mouth sores, dysphagia or nose bleed.  Respiratory: no for shortness of breath, cough or production of sputum.  Cardiovascular: no chest pain, no palpitations, no orthopnea.  Gastrointestinal: no nausea, vomiting or diarrhea. No abdominal pain, hematemesis or rectal bleeding.  Genitourinary: no dysuria or polyuria.  Hematologic/lymphatic: no lymph node abnormalities, no easy bruising or easy bleeding.  Musculoskeletal: no muscle or joint pain.  Stiff right knee.  Skin: No rash and no itching.  Neurological: no loss of consciousness, no seizure, no headache.  Behavioral/Psych: no depression or suicidal ideation.  Endocrine: no hot flashes.  Immunologic: negative.    ----------------------------------------------------------------------------------------------------------------------      Objective       Current [facility] Meds:  ascorbic acid, 500 mg, Oral, Daily  aspirin, 81 mg, Oral, Daily  DAPTOmycin, 6 mg/kg (Adjusted), Intravenous, Q24H  docusate sodium, 100 mg, Oral, BID  fludrocortisone, 50 mcg, Oral, Daily  fondaparinux, 2.5 mg, Subcutaneous, Q24H  Insulin Aspart, 0-14 Units, Subcutaneous, TID AC  insulin detemir, 23 Units,  Subcutaneous, Q12H  iron polysaccharides, 150 mg, Oral, Daily  levothyroxine, 25 mcg, Oral, Q AM  midodrine, 10 mg, Oral, TID AC  multivitamin with minerals, 1 tablet, Oral, Daily  pantoprazole, 40 mg, Oral, Q AM  polyethylene glycol, 17 g, Oral, Daily  pregabalin, 100 mg, Oral, Q12H      Pharmacy Consult,       ----------------------------------------------------------------------------------------------------------------------    Vital Signs:  Temp:  [98.1 °F (36.7 °C)-98.2 °F (36.8 °C)] 98.1 °F (36.7 °C)  Heart Rate:  [72-97] 77  Resp:  [20] 20  BP: ()/(59-76) 183/76  No data found.  SpO2 Percentage    11/07/22 0830 11/07/22 1900 11/08/22 0758   SpO2: 98% 97% 100%     SpO2:  [97 %-100 %] 100 %  on   ;   Device (Oxygen Therapy): room air    Body mass index is 28.34 kg/m².  Wt Readings from Last 3 Encounters:   11/01/22 89.6 kg (197 lb 8.5 oz)   09/23/22 98 kg (216 lb)   06/17/22 115 kg (254 lb)        Intake/Output Summary (Last 24 hours) at 11/8/2022 1021  Last data filed at 11/8/2022 0400  Gross per 24 hour   Intake 1080 ml   Output 1450 ml   Net -370 ml     Diet Regular; Thin; Consistent Carbohydrate  ----------------------------------------------------------------------------------------------------------------------      Physical Exam:    Constitutional: Chronically ill-appearing male is resting comfortably in bed on room air in no apparent distress.   HENT:  Head: Normocephalic and atraumatic.  Mouth:  Moist mucous membranes.    Eyes:  Conjunctivae and EOM are normal.  No scleral icterus.  Neck:  Neck supple.  No JVD present.    Cardiovascular:  Normal rate, regular rhythm and normal heart sounds with no murmur. No edema.  Pulmonary/Chest:  No respiratory distress, no wheezes, no crackles, with normal breath sounds and good air movement.  Abdominal:  Soft.  Bowel sounds are normal.  No distension and no tenderness.   Musculoskeletal:  No edema, no tenderness, and no deformity.  No swelling or redness  of joints.  Neurological:  Alert and oriented to person, place, and time.  No facial droop.  No slurred speech.   Skin:  Skin is warm and dry.  No rash noted.  No pallor.  Right knee surgical incision with good healing and staples removed.  No surrounding erythema, edema, warmth, or drainage.  Psychiatric:  Normal mood and affect.  Behavior is normal.    ----------------------------------------------------------------------------------------------------------------------  Results from last 7 days   Lab Units 11/06/22  0032   CK TOTAL U/L 29           Results from last 7 days   Lab Units 11/08/22  0135 11/05/22  0111   WBC 10*3/mm3 3.10* 3.11*   HEMOGLOBIN g/dL 8.8* 8.9*   HEMATOCRIT % 28.5* 28.7*   MCV fL 85.3 83.2   MCHC g/dL 30.9* 31.0*   PLATELETS 10*3/mm3 110* 90*     Results from last 7 days   Lab Units 11/08/22  0400 11/06/22  0032 11/05/22  0111   SODIUM mmol/L 133* 135* 133*   POTASSIUM mmol/L 4.2 4.2 4.0   CHLORIDE mmol/L 99 102 101   CO2 mmol/L 25.1 21.2* 24.7   BUN mg/dL 14 11 11   CREATININE mg/dL 0.78 0.65* 0.73*   CALCIUM mg/dL 8.3* 8.2* 8.3*   GLUCOSE mg/dL 257* 209* 181*   Estimated Creatinine Clearance: 117.6 mL/min (by C-G formula based on SCr of 0.78 mg/dL).  No results found for: AMMONIA    Glucose   Date/Time Value Ref Range Status   11/08/2022 0546 207 (H) 70 - 130 mg/dL Final     Comment:     Meter: [id number] : [id number] Tracey Crystal   11/07/2022 2002 277 (H) 70 - 130 mg/dL Final     Comment:     Meter: [id number] : [id number] YAMILET GAMA   11/07/2022 1614 253 (H) 70 - 130 mg/dL Final     Comment:     Meter: [id number] : [id number] Penelope Dawson   11/07/2022 1130 156 (H) 70 - 130 mg/dL Final     Comment:     Meter: [id number] : [id number] Penelope Dawson   11/07/2022 0833 218 (H) 70 - 130 mg/dL Final     Comment:     Meter: [id number] : [id number] lisa ramirez   11/07/2022 0653 85 70 - 130 mg/dL Final     Comment:     Meter: [id number] : [id number] YAMILET  NATAN   11/06/2022 2020 282 (H) 70 - 130 mg/dL Final     Comment:     Meter: [id number] : [id number] YAMILET GAMA   11/06/2022 1558 201 (H) 70 - 130 mg/dL Final     Comment:     Meter: [id number] : [id number] lisa ramirez     Lab Results   Component Value Date    HGBA1C 8.80 (H) 09/20/2022     Lab Results   Component Value Date    TSH 2.390 10/10/2022    FREET4 0.87 (L) 10/10/2022       Blood Culture   Date Value Ref Range Status   10/24/2022 No growth at 5 days  Final   10/24/2022 No growth at 5 days  Final     No results found for: URINECX  No results found for: WOUNDCX  No results found for: STOOLCX  No results found for: RESPCX  Pain Management Panel     Pain Management Panel Latest Ref Rng & Units 5/24/2022 5/11/2022    CREATININE UR mg/dL 91.0 -    AMPHETAMINES SCREEN, URINE Negative - Negative    BARBITURATES SCREEN Negative - Negative    BENZODIAZEPINE SCREEN, URINE Negative - Negative    BUPRENORPHINEUR Negative - Negative    COCAINE SCREEN, URINE Negative - Negative    METHADONE SCREEN, URINE Negative - Negative    METHAMPHETAMINEUR Negative - Negative            ----------------------------------------------------------------------------------------------------------------------  Imaging Results (Last 24 Hours)     ** No results found for the last 24 hours. **          ----------------------------------------------------------------------------------------------------------------------    Pertinent Infectious Disease Results    CPK on 11/6/2022 remains normal at 29.  COVID-19 PCR from 10/28/2022 negative.  Blood cultures from 10/18/2022 2 out of 2 sets positive for MRSA.  Blood cultures from 10/20/2022 2 out of 2 sets positive for MRSA.  Blood cultures from 10/22/2022 1 out of 2 sets positive for MRSA.  Blood cultures from 10/24/2022 finalized as no growth.  2D echo from 10/24/2022 reports no evidence of vegetation.  Updated TYRESE on 11/1/2022 showed a moderate sized echodense structure on the  posterior tricuspid leaflet but not attached to the leaflet.  The appearance and location are not typical for regurgitation.  This structure could be a normal variant.  No evidence of tricuspid valve stenosis or significant regurgitations.  Other valves also appear normal in structure with no evidence of stenosis or significant regurgitations.  No vegetation seen on these valves.  Recommendation was made to extend antibiotic therapy for possible infective endocarditis and repeat TYRESE in 3 months.      Assessment/Plan       Assessment     Right knee septic arthritis  MRSA bacteremia  Possible endocarditis    Plan      I have seen and examined the patient myself this morning and discussed the plan of care with Sara Sales PA-C and here are my findings:    Patient is sitting up in bed on room air with no apparent distress.  He has no complaints this morning with no fever or diarrhea reported overnight.  Lungs are clear to auscultation with no crackles wheezing or rhonchi and abdomen is soft with no tenderness and no guarding extremities with no edema.    WBC remains low but stable at 3.1 and for now would recommend to continue with daptomycin for 4 weeks since clearing of bacteremia which was 10/24/2022 and to continue with daptomycin through 11/20/2022 with repeat TYRESE at that time.  I discussed with Dr. Bowers the plan of care about this complicated case this morning and recommended a second read of the TYRESE.      Code Status:   Code Status and Medical Interventions:   Ordered at: 11/03/22 1015     Level Of Support Discussed With:    Patient     Code Status (Patient has no pulse and is not breathing):    CPR (Attempt to Resuscitate)     Medical Interventions (Patient has pulse or is breathing):    Full Support     Release to patient:    Routine Release       Sara Sales PA-C  11/08/22  10:21 EST     Electronically signed by Sara Sales PA-C, 11/08/22, 10:22 AM EST.    Electronically signed by Marilynn  Cortez Giordano MD, 11/08/22, 11:22 AM EST.

## 2022-11-08 NOTE — SIGNIFICANT NOTE
11/08/22 1523   Plan   Plan Team conference held today.  Spoke to pt about how he is doing in therapy and plans for discharge on 11-18-22.  Pt says he will need to continue I.V. antibiotics after he is discharge.  Pt has received I.V. antibiotics in the past from [facility].  Pt plans to return home with sister Edgar and brother-in-law Suhas at discharge.  Sister will assist pt at home.  Will follow.   Patient/Family in Agreement with Plan yes

## 2022-11-08 NOTE — PROGRESS NOTES
Rehabilitation Nursing  Inpatient Rehabilitation Plan of Care Note    Plan of Care  Copy from Junie    Pain Management (Active)  Current Status (10/28/2022 5:07:00 PM): potential for increased pain  Weekly Goal: pain at a tolerable level  Discharge Goal: no pain    Body Function Structure    Skin Integrity (Active)  Current Status (10/28/2022 5:02:00 PM): impaired skin integrity  Weekly Goal: improved skin integrity  Discharge Goal: wound healing    Safety    Potential for Injury (Active)  Current Status (10/28/2022 5:07:00 PM): potential for falls  Weekly Goal: no falls  Discharge Goal: no falls    Signed by: Mireya Brown, Nurse

## 2022-11-08 NOTE — THERAPY TREATMENT NOTE
Inpatient Rehabilitation - Physical Therapy Treatment Note       YVONNE Gaines     Patient Name: Melchor Hardwick III  : 1965  MRN: [id number]    Today's Date: 2022                    Admit Date: 10/28/2022      Visit Dx:     ICD-10-CM ICD-9-CM   1. Staphylococcal arthritis of right knee (HCC)  M00.061 711.06     041.10       Patient Active Problem List   Diagnosis   • Hyperlipidemia   • Hypertensive disorder   • Obesity   • Type 2 diabetes mellitus with hyperglycemia, with long-term current use of insulin (HCC)   • Gas gangrene of foot (HCC)   • Cellulitis in diabetic foot (HCC)   • Other acute osteomyelitis of left foot (HCC)   • Osteomyelitis (HCC)   • Critical illness myopathy   • Staphylococcal arthritis of right knee (HCC)   • Other cirrhosis of liver (HCC)   • MRSA bacteremia   • Endocarditis of tricuspid valve   • Wound of right buttock   • History of osteomyelitis   • Debility       Past Medical History:   Diagnosis Date   • Diabetes mellitus (HCC)    • Hyperlipidemia    • Hypertension    • Pyogenic arthritis of right knee joint, due to unspecified organism (HCC) 2022       Past Surgical History:   Procedure Laterality Date   • ABSCESS DRAINAGE      PERINEUM   • AMPUTATION FOOT Left 2022    Procedure: AMPUTATION FOOT;  Surgeon: Addison Colby MD;  Location: [facility] OR;  Service: Podiatry;  Laterality: Left;   • INCISION AND DRAINAGE LEG Left 05/10/2022    Procedure: INCISION AND DRAINAGE LOWER EXTREMITY;  Surgeon: Addison Colby MD;  Location:  [facility] OR;  Service: Podiatry;  Laterality: Left;   • KNEE INCISION AND DRAINAGE Right 2022    Procedure: KNEE INCISION AND DRAINAGE RIGHT;  Surgeon: Brain Bentley MD;  Location: [facility] OR;  Service: Orthopedics;  Laterality: Right;   • WOUND DEBRIDEMENT Left 2022    Procedure: DEBRIDEMENT FOOT;  Surgeon: Addison Colby MD;  Location: [facility] OR;  Service: Podiatry;  Laterality: Left;       PT ASSESSMENT (last 12 hours)     IRF PT  Evaluation and Treatment     Row Name 11/08/22 1500          PT Time and Intention    Document Type daily treatment  -RF     Mode of Treatment physical therapy  -RF     Patient/Family/Caregiver Comments/Observations Pt reports severe LBP this date. C/o R knee soreness also noted.  -RF     Row Name 11/08/22 1500          General Information    Patient Profile Reviewed yes  -RF     Existing Precautions/Restrictions fall;brace worn when out of bed  aircast L LE  -RF     Limitations/Impairments other (see comments)  ortho hypo  -RF     Row Name 11/08/22 1500          Pain Assessment    Pretreatment Pain Rating 6/10  knee, low back  -RF     Posttreatment Pain Rating 8/10  -RF     Row Name 11/08/22 1500          Cognition/Psychosocial    Affect/Mental Status (Cognition) WFL  -RF     Orientation Status (Cognition) oriented x 4  -RF     Follows Commands (Cognition) WFL  -RF     Personal Safety Interventions gait belt;nonskid shoes/slippers when out of bed;fall prevention program maintained  -RF     Row Name 11/08/22 1500          Mobility    Extremity Weight-bearing Status left lower extremity;right lower extremity  -RF     Left Lower Extremity (Weight-bearing Status) weight-bearing as tolerated (WBAT);other (see comments)  with brace/air cast  -RF     Right Lower Extremity (Weight-bearing Status) weight-bearing as tolerated (WBAT);other (see comments)  -RF     Row Name 11/08/22 1500          Bed Mobility    Bed Mobility supine-sit;sit-supine;scooting/bridging;rolling left;rolling right  -RF     Rolling Left [place] (Bed Mobility) modified independence  -RF     Rolling Right [place] (Bed Mobility) modified independence  -RF     Scooting/Bridging [place] (Bed Mobility) modified independence  -RF     Supine-Sit [place] (Bed Mobility) minimum assist (75% patient effort)  -RF     Sit-Supine [place] (Bed Mobility) standby assist;supervision  -RF     Assistive Device (Bed Mobility) bed rails  -RF     Row  Name 11/08/22 1500          Bed-Chair Transfer    Bed-Chair [place] (Transfers) contact guard;standby assist  -RF     Row Name 11/08/22 1500          Chair-Bed Transfer    Chair-Bed [place] (Transfers) contact guard;standby assist  -RF     Assistive Device (Chair-Bed Transfers) wheelchair;walker, front-wheeled  -RF     Row Name 11/08/22 1500          Sit-Stand Transfer    Sit-Stand [place] (Transfers) nonverbal cues (demo/gesture);verbal cues;contact guard;standby assist  -RF     Assistive Device (Sit-Stand Transfers) walker, front-wheeled  -RF     Row Name 11/08/22 1500          Stand-Sit Transfer    Stand-Sit [place] (Transfers) contact guard;verbal cues;nonverbal cues (demo/gesture);standby assist  -RF     Assistive Device (Stand-Sit Transfers) wheelchair;walker, front-wheeled  -RF     Row Name 11/08/22 1500          Gait/Stairs (Locomotion)    [place] Level (Gait) contact guard;nonverbal cues (demo/gesture);verbal cues  -RF     Assistive Device (Gait) parallel bars;walker, front-wheeled  -RF     Distance in Feet (Gait) 6' forward/backward in parallel bars, 40' with RW  -RF     Right Sided Gait Deviations heel strike decreased;weight shift ability decreased  Decreased R knee extension  -RF     Comment, (Gait/Stairs) Cues provided for increased QS and HS; increased UE support noted at RW.  -RF     Row Name 11/08/22 1500          Motor Skills    Therapeutic Exercise aerobic  -RF     Row Name 11/08/22 1500          Hip (Therapeutic Exercise)    Hip Strengthening (Therapeutic Exercise) bilateral;flexion;extension;aBduction;aDduction;heel slides;marching while seated;sitting;3 lb free weight;resistance band;blue;2 sets;10 repetitions;other (see comments)  #3 on LLE  -RF     Row Name 11/08/22 1500          Knee (Therapeutic Exercise)    Knee AAROM (Therapeutic Exercise) right;flexion;extension;sitting;2 sets;10 repetitions;other (see comments)  QS with OP, ham flex on SB with OP  -RF     Knee  PROM (Therapeutic Exercise) right;flexion;extension;sitting;10 second hold;10 repetitions  -RF     Knee Strengthening (Therapeutic Exercise) bilateral;flexion;extension;heel slides;marching while seated;LAQ (long arc quad);hamstring curls;sitting;3 lb free weight;resistance band;blue;2 sets;10 repetitions  #3 on LLE  -RF     Row Name 11/08/22 1500          Ankle (Therapeutic Exercise)    Ankle Strengthening (Therapeutic Exercise) bilateral;dorsiflexion;plantarflexion;sitting;3 lb free weight;2 sets;10 repetitions  -RF     Row Name 11/08/22 1500          Aerobic Exercise    Type (Aerobic Exercise) recumbent stationary bike  -RF     Time Performed (Aerobic Exercise) 10 mins  5 min intervals  -RF     Comment, Aerobic Exercise (Therapeutic Exercise) unable to complete full revolutions; pt able to actively self stretch into flexion and extension  -RF     Row Name 11/08/22 1500          Modality Intervention (Comprehensive)    Modality Treatment cryotherapy  -RF     Row Name 11/08/22 1500          Positioning and Restraints    Pre-Treatment Position in bed  AM, chair in PM  -RF     In Bed supine;call light within reach;encouraged to call for assist  PM  -RF     In Chair sitting;with OT  AM  -RF     Row Name 11/08/22 1500          Therapy Assessment/Plan (PT)    Rehab Potential/Prognosis (PT) good, to achieve stated therapy goals  -RF     Frequency of Treatment (PT) 5 times per week;90 minutes per session  -RF     Estimated Duration of Therapy (PT) 2 weeks;3 weeks  -RF     Row Name 11/08/22 1500          Daily Progress Summary (PT)    Functional Goal Overall Progress (PT) progressing toward functional goals as expected  -RF     Daily Progress Summary (PT) Fair functional mobility and ambulation quality noted this date. Pt hindered due to severe low back pain with minimal activity. R knee ROM deficits continually noted. Pt requires continued skilled care for further improvements to ensure maximum safe function upon D/C.   -RF     Impairments Still Limiting Function (PT) flexibility decreased;functional activity tolerance impairment;pain;range of motion deficit;strength deficit  -RF     Recommendations (PT) Continue per current POC  -RF     Row Name 11/08/22 1500          Therapy Plan Review/Discharge Plan (PT)    Anticipated Discharge Disposition (PT) home with assist;home with supervision;home with home health  -RF     Row Name 11/08/22 1500          IRF PT Goals    Bed Mobility Goal Selection (PT-IRF) bed mobility, PT goal 1  -RF     Gait (Walking Locomotion) Goal Selection (PT-IRF) gait, PT goal 1  -RF     Row Name 11/08/22 1500          Bed Mobility Goal 1 (PT-IRF)    Activity/Assistive Device (Bed Mobility Goal 1, PT-IRF) bed mobility activities, all  -RF     [place] Level (Bed Mobility Goal 1, PT-IRF) independent  -RF     Time Frame (Bed Mobility Goal 1, PT-IRF) long-term goal (LTG)  -RF     Progress/Outcomes (Bed Mobility Goal 1, PT-IRF) goal met  -RF     Row Name 11/08/22 1500          Gait/Walking Locomotion Goal 1 (PT-IRF)    Activity/Assistive Device (Gait/Walking Locomotion Goal 1, PT-IRF) gait (walking locomotion);assistive device use  -RF     Gait/Walking Locomotion Distance Goal 1 (PT-IRF) 30'  -RF     [place] Level (Gait/Walking Locomotion Goal 1, PT-IRF) standby assist;modified independence  -RF     Time Frame (Gait/Walking Locomotion Goal 1, PT-IRF) long-term goal (LTG)  -RF     Progress/Outcomes (Gait/Walking Locomotion Goal 1, PT-IRF) goal ongoing  -RF           User Key  (r) = Recorded By, (t) = Taken By, (c) = Cosigned By    Initials Name Provider Type    RF Meron Lei PTA Physical Therapist Assistant              Wound 09/21/22 1532 Right anterior knee Incision (Active)   Dressing Appearance open to air 11/08/22 1100   Closure Approximated 11/08/22 1100   Base dry;clean 11/08/22 1100   Periwound intact;dry 11/08/22 1100   [place]iwound Temperature warm 11/08/22 1100   Drainage Amount none 11/07/22  1908   Dressing Care open to air 11/08/22 1100       Wound 10/28/22 1827 Right coccyx Pressure Injury (Active)   Dressing Appearance open to air 11/07/22 1908   Base red;non-blanchable 11/07/22 1908   Drainage Amount none 11/07/22 1908   Care, Wound barrier applied 11/07/22 1908     Physical Therapy Education     Title: PT OT SLP Therapies (Done)     Topic: Physical Therapy (Done)     Point: Mobility training (Done)     Learning Progress Summary           Patient Acceptance, E,TB, VU by RF at 11/8/2022 1539    Acceptance, E,TB, VU by DG at 11/8/2022 0114    Acceptance, E,TB, VU by RF at 11/7/2022 1607    Acceptance, E,TB, VU by DG at 11/6/2022 2023    Acceptance, E,TB, VU by RF at 11/4/2022 1551    Acceptance, E,TB, VU,DU by HR at 11/3/2022 1541    Acceptance, E,D, VU,NR by RG at 11/3/2022 1442    Acceptance, E,TB, VU by DG at 11/1/2022 2016    Acceptance, E,D, VU,NR by RG at 11/1/2022 1541    Acceptance, E,TB, VU by DG at 10/31/2022 2315    Acceptance, E,D, VU,NR by RG at 10/31/2022 1446                   Point: Home exercise program (Done)     Learning Progress Summary           Patient Acceptance, E,TB, VU by RF at 11/8/2022 1539    Acceptance, E,TB, VU by DG at 11/8/2022 0114    Acceptance, E,TB, VU by RF at 11/7/2022 1607    Acceptance, E,TB, VU by DG at 11/6/2022 2023    Acceptance, E,TB, VU by RF at 11/4/2022 1551    Acceptance, E,TB, VU,DU by HR at 11/3/2022 1541    Acceptance, E,D, VU,NR by RG at 11/3/2022 1442    Acceptance, E,TB, VU by DG at 11/1/2022 2016    Acceptance, E,D, VU,NR by RG at 11/1/2022 1541    Acceptance, E,TB, VU by DG at 10/31/2022 2315    Acceptance, E,D, VU,NR by RG at 10/31/2022 1446                   Point: Body mechanics (Done)     Learning Progress Summary           Patient Acceptance, E,TB, VU by RF at 11/8/2022 1539    Acceptance, E,TB, VU by BRYSON at 11/8/2022 0114    Acceptance, E,TB, VU by RF at 11/7/2022 1607    Acceptance, E,TB, VU by BRYSON at 11/6/2022 2023    Acceptance, E,TB, VU  by RF at 11/4/2022 1551    Acceptance, E,TB, VU,DU by HR at 11/3/2022 1541    Acceptance, E,D, VU,NR by RG at 11/3/2022 1442    Acceptance, E,TB, VU by DG at 11/1/2022 2016    Acceptance, E,D, VU,NR by RG at 11/1/2022 1541    Acceptance, E,TB, VU by DG at 10/31/2022 2315    Acceptance, E,D, VU,NR by RG at 10/31/2022 1446                   Point: Precautions (Done)     Learning Progress Summary           Patient Acceptance, E,TB, VU by RF at 11/8/2022 1539    Acceptance, E,TB, VU by DG at 11/8/2022 0114    Acceptance, E,TB, VU by  at 11/7/2022 1607    Acceptance, E,TB, VU by DG at 11/6/2022 2023    Acceptance, E,TB, VU by  at 11/4/2022 1551    Acceptance, E,TB, VU,DU by HR at 11/3/2022 1541    Acceptance, E,D, VU,NR by RG at 11/3/2022 1442    Acceptance, E,TB, VU by DG at 11/1/2022 2016    Acceptance, E,D, VU,NR by RG at 11/1/2022 1541    Acceptance, E,TB, VU by  at 10/31/2022 2315    Acceptance, E,D, VU,NR by RG at 10/31/2022 1446                               User Key     Initials Effective Dates Name Provider Type Discipline     06/16/21 -  Phyllis Yañez RN Registered Nurse Nurse     06/16/21 -  Meron Lei PTA Physical Therapist Assistant PT     06/16/21 -  Michi Mckeon PTA Physical Therapist Assistant PT     01/14/22 -  Hanna Murphy PTA Physical Therapist Assistant PT                PT Recommendation and Plan    Frequency of Treatment (PT): 5 times per week, 90 minutes per session     Daily Progress Summary (PT)  Functional Goal Overall Progress (PT): progressing toward functional goals as expected  Daily Progress Summary (PT): Fair functional mobility and ambulation quality noted this date. Pt hindered due to severe low back pain with minimal activity. R knee ROM deficits continually noted. Pt requires continued skilled care for further improvements to ensure maximum safe function upon D/C.  Impairments Still Limiting Function (PT): flexibility decreased, functional activity tolerance  impairment, pain, range of motion deficit, strength deficit  Recommendations (PT): Continue per current POC               Time Calculation:      PT Charges     Row Name 11/08/22 1540 11/08/22 1539          Time Calculation    Start Time 1245  -RF 0915  -RF     Stop Time 1330  -RF 1000  -RF     Time Calculation (min) 45 min  -RF 45 min  -RF     PT Received On 11/08/22  -RF 11/08/22  -RF     PT - Next Appointment 11/09/22  -RF 11/08/22  -RF     PT Goal Re-Cert Due Date 11/11/22  -RF 11/11/22  -RF        Time Calculation- PT    Total Timed Code Minutes- PT 45 minute(s)  -RF 45 minute(s)  -RF           User Key  (r) = Recorded By, (t) = Taken By, (c) = Cosigned By    Initials Name Provider Type    RF Meron Lei, LIZETT Physical Therapist Assistant                Therapy Charges for Today     Code Description Service Date Service Provider Modifiers Qty    97906104151 HC GAIT TRAINING EA 15 MIN 11/7/2022 Meron Lei, PTA GP, CQ 1    91415795436 HC PT THER PROC EA 15 MIN 11/7/2022 Meron Lei, PTA GP, CQ 3    79281559912 HC PT THERAPEUTIC ACT EA 15 MIN 11/7/2022 Meron Lei, PTA GP, CQ 2    28031235170 HC GAIT TRAINING EA 15 MIN 11/8/2022 Meron Lei, PTA GP, CQ 1    24667350722 HC PT THER PROC EA 15 MIN 11/8/2022 Meron Lei, PTA GP, CQ 3    94097987792 HC PT THERAPEUTIC ACT EA 15 MIN 11/8/2022 Meron Lei, PTA GP, CQ 2                   Meron Lei, PTA  11/8/2022

## 2022-11-08 NOTE — THERAPY TREATMENT NOTE
Inpatient Rehabilitation - Occupational Therapy Treatment Note    YVONNE Gaines     Patient Name: Melchor Hardwick III  : 1965  MRN: [id number]    Today's Date: 2022                 Admit Date: 10/28/2022         ICD-10-CM ICD-9-CM   1. Staphylococcal arthritis of right knee (HCC)  M00.061 711.06     041.10       Patient Active Problem List   Diagnosis   • Hyperlipidemia   • Hypertensive disorder   • Obesity   • Type 2 diabetes mellitus with hyperglycemia, with long-term current use of insulin (HCC)   • Gas gangrene of foot (HCC)   • Cellulitis in diabetic foot (HCC)   • Other acute osteomyelitis of left foot (HCC)   • Osteomyelitis (HCC)   • Critical illness myopathy   • Staphylococcal arthritis of right knee (HCC)   • Other cirrhosis of liver (HCC)   • MRSA bacteremia   • Endocarditis of tricuspid valve   • Wound of right buttock   • History of osteomyelitis   • Debility       Past Medical History:   Diagnosis Date   • Diabetes mellitus (HCC)    • Hyperlipidemia    • Hypertension    • Pyogenic arthritis of right knee joint, due to unspecified organism (HCC) 2022       Past Surgical History:   Procedure Laterality Date   • ABSCESS DRAINAGE      PERINEUM   • AMPUTATION FOOT Left 2022    Procedure: AMPUTATION FOOT;  Surgeon: Addison Colby MD;  Location: [facility] OR;  Service: Podiatry;  Laterality: Left;   • INCISION AND DRAINAGE LEG Left 05/10/2022    Procedure: INCISION AND DRAINAGE LOWER EXTREMITY;  Surgeon: Addison Colby MD;  Location: [facility] OR;  Service: Podiatry;  Laterality: Left;   • KNEE INCISION AND DRAINAGE Right 2022    Procedure: KNEE INCISION AND DRAINAGE RIGHT;  Surgeon: Brain Bentley MD;  Location: [facility] OR;  Service: Orthopedics;  Laterality: Right;   • WOUND DEBRIDEMENT Left 2022    Procedure: DEBRIDEMENT FOOT;  Surgeon: Addison Colby MD;  Location: [facility] OR;  Service: Podiatry;  Laterality: Left;             IRF OT ASSESSMENT FLOWSHEET (last 12 hours)      IRF OT Evaluation and Treatment     Row Name 11/08/22 1420          OT Time and Intention    Document Type daily treatment  -HB     Mode of Treatment individual therapy;occupational therapy  -HB     Total Minutes, Occupational Therapy 90  -HB     Patient Effort good  -HB     Row Name 11/08/22 1420          General Information    Patient Profile Reviewed yes  -HB     Patient/Family/Caregiver Comments/Observations Patient and RN [email or web address] OT this date.  -HB     General Observations of Patient Patient tolerated OT well with no adverse reactions.  -HB     Existing Precautions/Restrictions fall;brace worn when out of bed  -HB     Row Name 11/08/22 1420          Pain Assessment    Pretreatment Pain Rating 4/10  -HB     Posttreatment Pain Rating 4/10  -HB     Pain Location - back  -     Row Name 11/08/22 1420          Cognition/Psychosocial    Affect/Mental Status (Cognition) WFL  -HB     Orientation Status (Cognition) oriented x 4  -HB     Follows Commands (Cognition) WFL  -HB     Row Name 11/08/22 1420          Grooming    [place] Level (Grooming) grooming skills;set up  -     Position (Grooming) supported standing  -     Row Name 11/08/22 1420          Motor Skills    Motor Skills coordination;functional endurance;motor control/coordination interventions  -     Motor Control/Coordination Interventions fine motor manipulation/dexterity activities;therapeutic exercise/ROM;gross motor coordination activities  -     Therapeutic Exercise shoulder;elbow/forearm;wrist;hand  -     Additional Documentation --  PRE 3x 3 min; wrist rolls; BUE TA to increase ADL status/endurance; rest between tasks  -     Row Name 11/08/22 1420          Positioning and Restraints    Pre-Treatment Position sitting in chair/recliner  -HB     Post Treatment Position wheelchair  -HB     In Bed encouraged to call for assist;call light within reach  -           User Key  (r) = Recorded By, (t) = Taken By, (c) = Cosigned By    Initials Name  Effective Dates     RalphDorothyPAYAM 05/25/21 -                  Occupational Therapy Education     Title: PT OT SLP Therapies (Done)     Topic: Occupational Therapy (Done)     Point: ADL training (Done)     Description:   Instruct learner(s) on proper safety adaptation and remediation techniques during self care or transfers.   Instruct in proper use of assistive devices.              Learning Progress Summary           Patient Acceptance, E, VU,NR by HB at 11/8/2022 1426    Acceptance, E,TB, VU by DG at 11/8/2022 0114    Acceptance, E, VU,NR by HB at 11/7/2022 1155    Acceptance, E,TB, VU by DG at 11/6/2022 2023    Acceptance, E, VU,NR by HB at 11/4/2022 1153    Acceptance, E, VU,NR by HB at 11/3/2022 1428    Acceptance, E,TB, VU by DG at 11/1/2022 2016    Acceptance, E,TB, VU by DG at 10/31/2022 2315    Acceptance, E, VU,NR by BF at 10/31/2022 1429    Acceptance, E,TB, VU by DL at 10/31/2022 0101    Acceptance, E,TB, VU by DL at 10/29/2022 2321    Acceptance, E, VU,NR by HB at 10/29/2022 1137                   Point: Home exercise program (Done)     Description:   Instruct learner(s) on appropriate technique for monitoring, assisting and/or progressing therapeutic exercises/activities.              Learning Progress Summary           Patient Acceptance, E, VU,NR by HB at 11/8/2022 1426    Acceptance, E,TB, VU by DG at 11/8/2022 0114    Acceptance, E, VU,NR by HB at 11/7/2022 1155    Acceptance, E,TB, VU by DG at 11/6/2022 2023    Acceptance, E, VU,NR by HB at 11/4/2022 1153    Acceptance, E, VU,NR by HB at 11/3/2022 1428    Acceptance, E,TB, VU by DG at 11/1/2022 2016    Acceptance, E,TB, VU by DG at 10/31/2022 2315    Acceptance, E,TB, VU by DL at 10/31/2022 0101    Acceptance, E,TB, VU by DL at 10/29/2022 2321    Acceptance, E, VU,NR by  at 10/29/2022 1137                   Point: Precautions (Done)     Description:   Instruct learner(s) on prescribed precautions during self-care and functional transfers.               Learning Progress Summary           Patient Acceptance, E, VU,NR by HB at 11/8/2022 1426    Acceptance, E,TB, VU by DG at 11/8/2022 0114    Acceptance, E, VU,NR by HB at 11/7/2022 1155    Acceptance, E,TB, VU by DG at 11/6/2022 2023    Acceptance, E, VU,NR by HB at 11/4/2022 1153    Acceptance, E, VU,NR by HB at 11/3/2022 1428    Acceptance, E,TB, VU by DG at 11/1/2022 2016    Acceptance, E,TB, VU by DG at 10/31/2022 2315    Acceptance, E, VU,NR by BF at 10/31/2022 1429    Acceptance, E,TB, VU by DL at 10/31/2022 0101    Acceptance, E,TB, VU by DL at 10/29/2022 2321    Acceptance, E, VU,NR by HB at 10/29/2022 1137                   Point: Body mechanics (Done)     Description:   Instruct learner(s) on proper positioning and spine alignment during self-care, functional mobility activities and/or exercises.              Learning Progress Summary           Patient Acceptance, E, VU,NR by HB at 11/8/2022 1426    Acceptance, E,TB, VU by DG at 11/8/2022 0114    Acceptance, E, VU,NR by HB at 11/7/2022 1155    Acceptance, E,TB, VU by DG at 11/6/2022 2023    Acceptance, E, VU,NR by HB at 11/4/2022 1153    Acceptance, E, VU,NR by HB at 11/3/2022 1428    Acceptance, E,TB, VU by DG at 11/1/2022 2016    Acceptance, E,TB, VU by DG at 10/31/2022 2315    Acceptance, E,TB, VU by DL at 10/31/2022 0101    Acceptance, E,TB, VU by DL at 10/29/2022 2321    Acceptance, E, VU,NR by  at 10/29/2022 1137                               User Key     Initials Effective Dates Name Provider Type Discipline    DG 06/16/21 -  Phyllis Yañez RN Registered Nurse Nurse    BF 06/16/21 -  Mandi Smith OT Occupational Therapist OT     05/25/21 -  Dorothy Rosas OT Occupational Therapist OT    DL 03/16/22 -  Hellen Mcdonnell, RN Registered Nurse Nurse                    OT Recommendation and Plan    Planned Therapy Interventions (OT): activity tolerance training, adaptive equipment training, BADL retraining, IADL retraining,  patient/caregiver education/training, strengthening exercise, transfer/mobility retraining                    Time Calculation:      Time Calculation- OT     Row Name 11/08/22 1426             Time Calculation- OT    OT Start Time 1000  -HB      OT Stop Time 1130  -HB      OT Time Calculation (min) 90 min  -HB      Total Timed Code Minutes- OT 90 minute(s)  -HB            User Key  (r) = Recorded By, (t) = Taken By, (c) = Cosigned By    Initials Name Provider Type     Dorothy Rosas OT Occupational Therapist              Therapy Charges for Today     Code Description Service Date Service Provider Modifiers Qty    03647759035 HC OT SELF CARE/MGMT/TRAIN EA 15 MIN 11/7/2022 Dorothy Rosas OT GO 1    88824959103 HC OT THER PROC EA 15 MIN 11/7/2022 Dorothy Rosas OT GO 1    41993747959 HC OT THERAPEUTIC ACT EA 15 MIN 11/7/2022 Dorothy Rosas OT GO 1    18825209306 HC OT SELF CARE/MGMT/TRAIN EA 15 MIN 11/8/2022 Dorothy Rosas OT GO 1    13061324613 HC OT THER PROC EA 15 MIN 11/8/2022 Dorothy Rosas OT GO 2    41163540932 HC OT THERAPEUTIC ACT EA 15 MIN 11/8/2022 Dorothy Rosas OT GO 3                   Dorothy Rosas OT  11/8/2022

## 2022-11-08 NOTE — PLAN OF CARE
Goal Outcome Evaluation:               Resting in bed with no acute distress noted. Continue current plan of care.

## 2022-11-08 NOTE — PROGRESS NOTES
Case Management  Inpatient Rehabilitation Team Conference    Conference Date/Time: 11/8/2022 9:25:36 AM    Team Conference Attendees:  MD Ave Coughlin SW Jessica Bill, RN,   JD Wilkerson, PT  Dorothy Rosas OT    Demographics            Age: 57Y            Gender: Male    Admission Date: 10/28/2022 5:02:00 PM  Rehabilitation Diagnosis:  debility  Comorbidities: M00.9 Pyogenic arthritis, unspecified  D69.6 Thrombocytopenia, unspecified  E11.42 Type 2 diabetes mellitus with diabetic polyneuropathy  B95.62 Methicillin resistant Staphylococcus aureus infection as the cause of  diseases classified elsewhere  D64.9 Anemia, unspecified  E03.9 Hypothyroidism, unspecified  I10 Essential (primary) hypertension  K74.60 Unspecified cirrhosis of liver  K75.81 Nonalcoholic steatohepatitis (PAZ)  Z79.4 Long term (current) use of insulin  Z89.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  Medical Necessity Expected Level Rationale: fair-good  Intensity and Duration: an average of 3 hours/5 days per week  Medical Supervision and 24 Hour Rehab Nursing: x  Physical Therapy: x  PT Intensity/Duration: PT 1.5 hours per day/5 days per week  Occupational Therapy: x  OT Intensity/Duration: OT 1.5 hours per day/5 days per week  Social Work: x  Therapeutic Recreation: x  Updated (if changes indicated)    Anticipated Discharge Date/Estimated Length of Stay:   11-18-22      Discharge Plan of Care:    Based on the patient's medical and functional status, their prognosis and  expected level of functional improvement is: fair-good      Interdisciplinary Problem/Goals/Status  Body Function Structure    [RN] Skin Integrity(Active)  Current Status(10/28/2022): impaired  skin integrity  Weekly Goal(11/18/2022): improved skin integrity  Discharge Goal: wound healing        Mobility    [PT] Bed/Chair/Wheelchair(Active)  Current Status(10/29/2022): MI  Weekly Goal(11/05/2022): MI/I  Discharge Goal: I    [PT] Walk(Active)  Current Status(10/29/2022): N/A d/t ortho hypo  Weekly Goal(11/05/2022): 15' RW CGA  Discharge Goal: 30' RW SBA/MI        Pain    [RN] Pain Management(Active)  Current Status(10/28/2022): potential for increased pain  Weekly Goal(11/18/2022): pain at a tolerable level  Discharge Goal: no pain        Safety    [RN] Potential for Injury(Active)  Current Status(10/28/2022): potential for falls  Weekly Goal(11/18/2022): no falls  Discharge Goal: no falls        Self Care    [OT] Dressing (Lower)(Active)  Current Status(11/06/2022): min  Weekly Goal(11/15/2022): SBA  Discharge Goal: SBA    Comments: Pt plans to return home with sister and brother-in-law at discharge.    Signed by: KAREN Calderon    Physician CoSigned By: Ja Bowers 11/08/2022 18:02:59

## 2022-11-09 LAB
ANION GAP SERPL CALCULATED.3IONS-SCNC: 6.8 MMOL/L (ref 5–15)
BUN SERPL-MCNC: 12 MG/DL (ref 6–20)
BUN/CREAT SERPL: 14.6 (ref 7–25)
CALCIUM SPEC-SCNC: 8.4 MG/DL (ref 8.6–10.5)
CHLORIDE SERPL-SCNC: 101 MMOL/L (ref 98–107)
CO2 SERPL-SCNC: 26.2 MMOL/L (ref 22–29)
CREAT SERPL-MCNC: 0.82 MG/DL (ref 0.76–1.27)
EGFRCR SERPLBLD CKD-EPI 2021: 102.5 ML/MIN/1.73
GLUCOSE BLDC GLUCOMTR-MCNC: 105 MG/DL (ref 70–130)
GLUCOSE BLDC GLUCOMTR-MCNC: 186 MG/DL (ref 70–130)
GLUCOSE BLDC GLUCOMTR-MCNC: 191 MG/DL (ref 70–130)
GLUCOSE SERPL-MCNC: 181 MG/DL (ref 65–99)
POTASSIUM SERPL-SCNC: 4.2 MMOL/L (ref 3.5–5.2)
SODIUM SERPL-SCNC: 134 MMOL/L (ref 136–145)

## 2022-11-09 PROCEDURE — 82962 GLUCOSE BLOOD TEST: CPT

## 2022-11-09 PROCEDURE — 97530 THERAPEUTIC ACTIVITIES: CPT

## 2022-11-09 PROCEDURE — 99232 SBSQ HOSP IP/OBS MODERATE 35: CPT | Performed by: INTERNAL MEDICINE

## 2022-11-09 PROCEDURE — 80048 BASIC METABOLIC PNL TOTAL CA: CPT | Performed by: INTERNAL MEDICINE

## 2022-11-09 PROCEDURE — 97112 NEUROMUSCULAR REEDUCATION: CPT

## 2022-11-09 PROCEDURE — 97535 SELF CARE MNGMENT TRAINING: CPT

## 2022-11-09 PROCEDURE — 25010000002 DAPTOMYCIN PER 1 MG: Performed by: INTERNAL MEDICINE

## 2022-11-09 PROCEDURE — 99231 SBSQ HOSP IP/OBS SF/LOW 25: CPT | Performed by: INTERNAL MEDICINE

## 2022-11-09 PROCEDURE — 97110 THERAPEUTIC EXERCISES: CPT

## 2022-11-09 PROCEDURE — 63710000001 INSULIN ASPART PER 5 UNITS: Performed by: INTERNAL MEDICINE

## 2022-11-09 PROCEDURE — 63710000001 INSULIN DETEMIR PER 5 UNITS: Performed by: INTERNAL MEDICINE

## 2022-11-09 PROCEDURE — 25010000002 FONDAPARINUX PER 0.5 MG: Performed by: FAMILY MEDICINE

## 2022-11-09 PROCEDURE — 97116 GAIT TRAINING THERAPY: CPT

## 2022-11-09 RX ADMIN — DAPTOMYCIN 500 MG: 500 INJECTION, POWDER, LYOPHILIZED, FOR SOLUTION INTRAVENOUS at 18:19

## 2022-11-09 RX ADMIN — CARBIDOPA AND LEVODOPA 10 MG: 50; 200 TABLET, EXTENDED RELEASE ORAL at 11:20

## 2022-11-09 RX ADMIN — HYDROCODONE BITARTRATE AND ACETAMINOPHEN 1 TABLET: 5; 325 TABLET ORAL at 08:30

## 2022-11-09 RX ADMIN — INSULIN DETEMIR 23 UNITS: 100 INJECTION, SOLUTION SUBCUTANEOUS at 08:41

## 2022-11-09 RX ADMIN — INSULIN ASPART 3 UNITS: 100 INJECTION, SOLUTION INTRAVENOUS; SUBCUTANEOUS at 17:16

## 2022-11-09 RX ADMIN — PANTOPRAZOLE SODIUM 40 MG: 40 TABLET, DELAYED RELEASE ORAL at 05:36

## 2022-11-09 RX ADMIN — DOCUSATE SODIUM 100 MG: 100 CAPSULE ORAL at 21:08

## 2022-11-09 RX ADMIN — Medication 150 MG: at 08:29

## 2022-11-09 RX ADMIN — FLUDROCORTISONE ACETATE 50 MCG: 0.1 TABLET ORAL at 08:29

## 2022-11-09 RX ADMIN — OXYCODONE HYDROCHLORIDE AND ACETAMINOPHEN 500 MG: 500 TABLET ORAL at 08:29

## 2022-11-09 RX ADMIN — CARBIDOPA AND LEVODOPA 10 MG: 50; 200 TABLET, EXTENDED RELEASE ORAL at 06:31

## 2022-11-09 RX ADMIN — HYDROCODONE BITARTRATE AND ACETAMINOPHEN 1 TABLET: 5; 325 TABLET ORAL at 21:08

## 2022-11-09 RX ADMIN — Medication 1 TABLET: at 08:30

## 2022-11-09 RX ADMIN — ASPIRIN 81 MG: 81 TABLET, COATED ORAL at 08:29

## 2022-11-09 RX ADMIN — POLYETHYLENE GLYCOL (3350) 17 G: 17 POWDER, FOR SOLUTION ORAL at 08:30

## 2022-11-09 RX ADMIN — PREGABALIN 100 MG: 100 CAPSULE ORAL at 21:08

## 2022-11-09 RX ADMIN — INSULIN DETEMIR 23 UNITS: 100 INJECTION, SOLUTION SUBCUTANEOUS at 21:08

## 2022-11-09 RX ADMIN — LEVOTHYROXINE SODIUM 25 MCG: 0.07 TABLET ORAL at 05:36

## 2022-11-09 RX ADMIN — FONDAPARINUX SODIUM 2.5 MG: 2.5 INJECTION, SOLUTION SUBCUTANEOUS at 08:30

## 2022-11-09 RX ADMIN — INSULIN ASPART 3 UNITS: 100 INJECTION, SOLUTION INTRAVENOUS; SUBCUTANEOUS at 11:20

## 2022-11-09 RX ADMIN — CARBIDOPA AND LEVODOPA 10 MG: 50; 200 TABLET, EXTENDED RELEASE ORAL at 17:17

## 2022-11-09 RX ADMIN — DOCUSATE SODIUM 100 MG: 100 CAPSULE ORAL at 08:29

## 2022-11-09 RX ADMIN — PREGABALIN 100 MG: 100 CAPSULE ORAL at 08:30

## 2022-11-09 NOTE — PROGRESS NOTES
PROGRESS NOTE         Patient Identification:  Name:  Melchor Hardwick III  Age:  57 y.o.  Sex:  male  :  1965  MRN:  [id number]  Visit Number:  [id number]  Primary Care Provider:  Missy Up APRN         LOS: 12 days       ----------------------------------------------------------------------------------------------------------------------  Subjective       Chief Complaints:    No chief complaint on file.        Interval History:      The patient remains on room air today in no apparent distress. Working with occupational therapy and is able to ambulate now with resolved orthostatic hypotension.  His right knee looks great without erythema, edema, or warmth.  He feels overall much better.  Still waiting for TYRESE to be read by a second cardiologist.  Afebrile, no diarrhea.  No new labs.    Review of Systems:    Constitutional: no fever, chills and night sweats.  Generalized fatigue.  Eyes: no eye drainage, itching or redness.  HEENT: no mouth sores, dysphagia or nose bleed.  Respiratory: no for shortness of breath, cough or production of sputum.  Cardiovascular: no chest pain, no palpitations, no orthopnea.  Gastrointestinal: no nausea, vomiting or diarrhea. No abdominal pain, hematemesis or rectal bleeding.  Genitourinary: no dysuria or polyuria.  Hematologic/lymphatic: no lymph node abnormalities, no easy bruising or easy bleeding.  Musculoskeletal: no muscle or joint pain.  Stiff right knee.  Skin: No rash and no itching.  Neurological: no loss of consciousness, no seizure, no headache.  Behavioral/Psych: no depression or suicidal ideation.  Endocrine: no hot flashes.  Immunologic: negative.    ----------------------------------------------------------------------------------------------------------------------      Objective       [facility] [place] Meds:  ascorbic acid, 500 mg, Oral, Daily  aspirin, 81 mg, Oral, Daily  DAPTOmycin, 6 mg/kg (Adjusted), Intravenous, Q24H  docusate sodium, 100  mg, Oral, BID  fludrocortisone, 50 mcg, Oral, Daily  fondaparinux, 2.5 mg, Subcutaneous, Q24H  Insulin Aspart, 0-14 Units, Subcutaneous, TID AC  insulin detemir, 23 Units, Subcutaneous, Q12H  iron polysaccharides, 150 mg, Oral, Daily  levothyroxine, 25 mcg, Oral, Q AM  midodrine, 10 mg, Oral, TID AC  multivitamin with minerals, 1 tablet, Oral, Daily  pantoprazole, 40 mg, Oral, Q AM  polyethylene glycol, 17 g, Oral, Daily  pregabalin, 100 mg, Oral, Q12H      Pharmacy Consult,       ----------------------------------------------------------------------------------------------------------------------    Vital Signs:  Temp:  [98 °F (36.7 °C)-98.5 °F (36.9 °C)] 98 °F (36.7 °C)  Heart Rate:  [79-94] 79  Resp:  [18-20] 18  BP: (110-121)/(61-75) 121/66  No data found.  SpO2 Percentage    11/08/22 0758 11/08/22 1912 11/09/22 0733   SpO2: 100% 97% 100%     SpO2:  [97 %-100 %] 100 %  on   ;   Device (Oxygen Therapy): room air    Body mass index is 28.34 kg/m².  Wt Readings from Last 3 Encounters:   11/01/22 89.6 kg (197 lb 8.5 oz)   09/23/22 98 kg (216 lb)   06/17/22 115 kg (254 lb)        Intake/Output Summary (Last 24 hours) at 11/9/2022 0901  Last data filed at 11/9/2022 0821  Gross per 24 hour   Intake 1440 ml   Output 2350 ml   Net -910 ml     Diet Regular; Thin; Consistent Carbohydrate  ----------------------------------------------------------------------------------------------------------------------      Physical Exam:    Constitutional: Chronically ill-appearing male is working with occupational therapy this morning.  Appears comfortable and in no apparent distress.  HENT:  Head: Normocephalic and atraumatic.  Mouth:  Moist mucous membranes.    Eyes:  Conjunctivae and EOM are normal.  No scleral icterus.  Neck:  Neck supple.  No JVD present.    Cardiovascular:  Normal rate, regular rhythm and normal heart sounds with no murmur. No edema.  Pulmonary/Chest:  No respiratory distress, no wheezes, no crackles, with  normal breath sounds and good air movement.  Abdominal:  Soft.  Bowel sounds are normal.  No distension and no tenderness.   Musculoskeletal:  No edema, no tenderness, and no deformity.  No swelling or redness of joints.  Neurological:  Alert and oriented to person, place, and time.  No facial droop.  No slurred speech.   Skin:  Skin is warm and dry.  No rash noted.  No pallor.  Right knee surgical incision with good healing and staples removed.  No surrounding erythema, edema, warmth, or drainage.  Psychiatric:  Normal mood and affect.  Behavior is normal.    ----------------------------------------------------------------------------------------------------------------------  Results from last 7 days   Lab Units 11/06/22  0032   CK TOTAL U/L 29           Results from last 7 days   Lab Units 11/08/22  0135 11/05/22  0111   WBC 10*3/mm3 3.10* 3.11*   HEMOGLOBIN g/dL 8.8* 8.9*   HEMATOCRIT % 28.5* 28.7*   MCV fL 85.3 83.2   MCHC g/dL 30.9* 31.0*   PLATELETS 10*3/mm3 110* 90*     Results from last 7 days   Lab Units 11/09/22  0144 11/08/22  0400 11/06/22  0032   SODIUM mmol/L 134* 133* 135*   POTASSIUM mmol/L 4.2 4.2 4.2   CHLORIDE mmol/L 101 99 102   CO2 mmol/L 26.2 25.1 21.2*   BUN mg/dL 12 14 11   CREATININE mg/dL 0.82 0.78 0.65*   CALCIUM mg/dL 8.4* 8.3* 8.2*   GLUCOSE mg/dL 181* 257* 209*   Estimated Creatinine Clearance: 111.9 mL/min (by C-G formula based on SCr of 0.82 mg/dL).  No results found for: AMMONIA    Glucose   Date/Time Value Ref Range Status   11/09/2022 0709 105 70 - 130 mg/dL Final     Comment:     Meter: [id number] : [id number] Tracey Crystal   11/08/2022 2006 261 (H) 70 - 130 mg/dL Final     Comment:     Meter: [id number] : [id number] Raul Wu   11/08/2022 1624 199 (H) 70 - 130 mg/dL Final     Comment:     Meter: [id number] : [id number] Audrey Mcginnis   11/08/2022 1057 204 (H) 70 - 130 mg/dL Final     Comment:     Meter: [id number] : [id number] Audrey Mcginnis    11/08/2022 0546 207 (H) 70 - 130 mg/dL Final     Comment:     Meter: [id number] : [id number] Tracey Crystal   11/07/2022 2002 277 (H) 70 - 130 mg/dL Final     Comment:     Meter: [id number] : [id number] YAMILET GAMA   11/07/2022 1614 253 (H) 70 - 130 mg/dL Final     Comment:     Meter: [id number] : [id number] Penelope Eunice   11/07/2022 1130 156 (H) 70 - 130 mg/dL Final     Comment:     Meter: [id number] : [id number] Negrete Eunice     Lab Results   Component Value Date    HGBA1C 8.80 (H) 09/20/2022     Lab Results   Component Value Date    TSH 2.390 10/10/2022    FREET4 0.87 (L) 10/10/2022       Blood Culture   Date Value Ref Range Status   10/24/2022 No growth at 5 days  Final   10/24/2022 No growth at 5 days  Final     No results found for: URINECX  No results found for: WOUNDCX  No results found for: STOOLCX  No results found for: RESPCX  Pain Management Panel     Pain Management Panel Latest Ref Rng & Units 5/24/2022 5/11/2022    CREATININE UR mg/dL 91.0 -    AMPHETAMINES SCREEN, URINE Negative - Negative    BARBITURATES SCREEN Negative - Negative    BENZODIAZEPINE SCREEN, URINE Negative - Negative    BUPRENORPHINEUR Negative - Negative    COCAINE SCREEN, URINE Negative - Negative    METHADONE SCREEN, URINE Negative - Negative    METHAMPHETAMINEUR Negative - Negative            ----------------------------------------------------------------------------------------------------------------------  Imaging Results (Last 24 Hours)     ** No results found for the last 24 hours. **          ----------------------------------------------------------------------------------------------------------------------    Pertinent Infectious Disease Results    CPK on 11/6/2022 remains normal at 29.  COVID-19 PCR from 10/28/2022 negative.  Blood cultures from 10/18/2022 2 out of 2 sets positive for MRSA.  Blood cultures from 10/20/2022 2 out of 2 sets positive for MRSA.  Blood cultures from 10/22/2022 1 out  of 2 sets positive for MRSA.  Blood cultures from 10/24/2022 finalized as no growth.  2D echo from 10/24/2022 reports no evidence of vegetation.  Updated TYRESE on 11/1/2022 showed a moderate sized echodense structure on the posterior tricuspid leaflet but not attached to the leaflet.  The appearance and location are not typical for regurgitation.  This structure could be a normal variant.  No evidence of tricuspid valve stenosis or significant regurgitations.  Other valves also appear normal in structure with no evidence of stenosis or significant regurgitations.  No vegetation seen on these valves.  Recommendation was made to extend antibiotic therapy for possible infective endocarditis and repeat TYRESE in 3 months.      Assessment/Plan       Assessment     Right knee septic arthritis  MRSA bacteremia  Possible endocarditis    Plan      I have seen and examined the patient myself this morning and discussed the plan of care with Sara Sales PA-C and here are my findings:    Patient remains on room air today with no apparent distress he is working with occupational therapy with completely resolved orthostatic hypotension.  Feels significantly better.    His right knee looks great without any erythema warmth or drainage and his lungs are clear to auscultation and heart regular rhythm and rate with no murmur.    I discussed the case with primary team Dr. Fleming and awaiting for TYRESE to be read by second cardiologist and recommend to continue with daptomycin through 11/20/2022 with a repeat TYRESE towards the end of therapy or within couple weeks after discharge.  I discussed the case with  regarding possibly planning for his discharge to be around 11/22/2022 with plans to repeat blood cultures on that day and ID follow-up as outpatient.  I ordered blood cultures to be done x2 sets 30 minutes apart peripherally on 11/22/2022.    Code Status:   Code Status and Medical Interventions:   Ordered at: 11/03/22 1015      Level Of Support Discussed With:    Patient     Code Status (Patient has no pulse and is not breathing):    CPR (Attempt to Resuscitate)     Medical Interventions (Patient has pulse or is breathing):    Full Support     Release to patient:    Routine Release       Sara Sales PA-C  11/09/22  09:01 EST     Electronically signed by Sara Sales PA-C, 11/09/22, 11:37 AM EST.    Electronically signed by Marilynn Giordano MD, 11/09/22, 12:29 PM EST.

## 2022-11-09 NOTE — PROGRESS NOTES
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    Interview History/HPI: Patient states she is feeling better today.  He was able to walk about 60 feet today.  He states he did not feel dizzy, he denies chest pain or shortness of breath and is tolerating his diet, currently working with occupational therapy and continues to state he feels good.          Current Hospital Meds:  ascorbic acid, 500 mg, Oral, Daily  aspirin, 81 mg, Oral, Daily  DAPTOmycin, 6 mg/kg (Adjusted), Intravenous, Q24H  docusate sodium, 100 mg, Oral, BID  fludrocortisone, 50 mcg, Oral, Daily  fondaparinux, 2.5 mg, Subcutaneous, Q24H  Insulin Aspart, 0-14 Units, Subcutaneous, TID AC  insulin detemir, 23 Units, Subcutaneous, Q12H  iron polysaccharides, 150 mg, Oral, Daily  levothyroxine, 25 mcg, Oral, Q AM  midodrine, 10 mg, Oral, TID AC  multivitamin with minerals, 1 tablet, Oral, Daily  pantoprazole, 40 mg, Oral, Q AM  polyethylene glycol, 17 g, Oral, Daily  pregabalin, 100 mg, Oral, Q12H    Pharmacy Consult,         Vitals:    11/09/22 0733   BP: 121/66   Pulse: 79   Resp: 18   Temp: 98 °F (36.7 °C)   SpO2: 100%         Intake/Output Summary (Last 24 hours) at 11/9/2022 1355  Last data filed at 11/9/2022 1215  Gross per 24 hour   Intake 1440 ml   Output 2400 ml   Net -960 ml       EXAM: He is well sitting at the Occupational Therapy table working with him color is good upper extremity strength appears to be symmetric, lungs about breath sounds are clear, heart regular rate and rhythm without murmur gallop.  He has a boot on his left transmetatarsal amputation area.  Trace edema of his left lower extremity to 1+.      Diet Regular; Thin; Consistent Carbohydrate        LABS:     Lab Results (last 48 hours)     Procedure Component Value Units Date/Time    POC Glucose Once [[id number]]  (Abnormal) Collected: 11/09/22  1041    Specimen: Blood Updated: 11/09/22 1048     Glucose 191 mg/dL      Comment: Meter: [id number] : [id number] NADJA RANGEL       POC Glucose Once [[id number]]  (Normal) Collected: 11/09/22 0709    Specimen: Blood Updated: 11/09/22 0718     Glucose 105 mg/dL      Comment: Meter: [id number] : [id number] [employer]       Basic Metabolic Panel [[id number]]  (Abnormal) Collected: 11/09/22 0144    Specimen: Blood Updated: 11/09/22 0223     Glucose 181 mg/dL      BUN 12 mg/dL      Creatinine 0.82 mg/dL      Sodium 134 mmol/L      Potassium 4.2 mmol/L      Comment: Slight hemolysis detected by analyzer. Results may be affected.        Chloride 101 mmol/L      CO2 26.2 mmol/L      Calcium 8.4 mg/dL      BUN/Creatinine Ratio 14.6     Anion Gap 6.8 mmol/L      eGFR 102.5 mL/min/1.73      Comment: National Kidney Foundation and American Society of Nephrology (ASN) Task Force recommended calculation based on the Chronic Kidney Disease Epidemiology Collaboration (CKD-EPI) equation refit without adjustment for race.       Narrative:      GFR Normal >60  Chronic Kidney Disease <60  Kidney Failure <15      POC Glucose Once [[id number]]  (Abnormal) Collected: 11/08/22 2006    Specimen: Blood Updated: 11/08/22 2023     Glucose 261 mg/dL      Comment: Meter: [id number] : [id number] Raul Wu       POC Glucose Once [[id number]]  (Abnormal) Collected: 11/08/22 1624    Specimen: Blood Updated: 11/08/22 1648     Glucose 199 mg/dL      Comment: Meter: [id number] : [id number] Audrey Mcginnis       POC Glucose Once [[id number]]  (Abnormal) Collected: 11/08/22 1057    Specimen: Blood Updated: 11/08/22 1106     Glucose 204 mg/dL      Comment: Meter: [id number] : [id number] Audrey Mcginnis       POC Glucose Once [[id number]]  (Abnormal) Collected: 11/08/22 0546    Specimen: Blood Updated: 11/08/22 0626     Glucose 207 mg/dL      Comment: Meter: [id number] : [id number] [employer]       Basic Metabolic Panel  [[id number]]  (Abnormal) Collected: 11/08/22 0400    Specimen: Blood Updated: 11/08/22 0427     Glucose 257 mg/dL      BUN 14 mg/dL      Creatinine 0.78 mg/dL      Sodium 133 mmol/L      Potassium 4.2 mmol/L      Chloride 99 mmol/L      CO2 25.1 mmol/L      Calcium 8.3 mg/dL      BUN/Creatinine Ratio 17.9     Anion Gap 8.9 mmol/L      eGFR 104.0 mL/min/1.73      Comment: National Kidney Foundation and American Society of Nephrology (ASN) Task Force recommended calculation based on the Chronic Kidney Disease Epidemiology Collaboration (CKD-EPI) equation refit without adjustment for race.       Narrative:      GFR Normal >60  Chronic Kidney Disease <60  Kidney Failure <15      CBC & Differential [[id number]]  (Abnormal) Collected: 11/08/22 0135    Specimen: Blood Updated: 11/08/22 0152    Narrative:      The following orders were created for panel order CBC & Differential.  Procedure                               Abnormality         Status                     ---------                               -----------         ------                     CBC Auto Differential[[id number]]        Abnormal            Final result                 Please view results for these tests on the individual orders.    CBC Auto Differential [[id number]]  (Abnormal) Collected: 11/08/22 0135    Specimen: Blood Updated: 11/08/22 0152     WBC 3.10 10*3/mm3      RBC 3.34 10*6/mm3      Hemoglobin 8.8 g/dL      Hematocrit 28.5 %      MCV 85.3 fL      MCH 26.3 pg      MCHC 30.9 g/dL      RDW 18.6 %      RDW-SD 57.5 fl      MPV 11.8 fL      Platelets 110 10*3/mm3      Neutrophil % 60.3 %      Lymphocyte % 28.1 %      Monocyte % 9.0 %      Eosinophil % 2.3 %      Basophil % 0.0 %      Immature Grans % 0.3 %      Neutrophils, Absolute 1.87 10*3/mm3      Lymphocytes, Absolute 0.87 10*3/mm3      Monocytes, Absolute 0.28 10*3/mm3      Eosinophils, Absolute 0.07 10*3/mm3      Basophils, Absolute 0.00 10*3/mm3      Immature Grans, Absolute 0.01 10*3/mm3       nRBC 0.0 /100 WBC     POC Glucose Once [[id number]]  (Abnormal) Collected: 11/07/22 2002    Specimen: Blood Updated: 11/07/22 2008     Glucose 277 mg/dL      Comment: Meter: [id number] : [id number] YAMILET GAMA       POC Glucose Once [[id number]]  (Abnormal) Collected: 11/07/22 1614    Specimen: Blood Updated: 11/07/22 1630     Glucose 253 mg/dL      Comment: Meter: [id number] : [id number] [facility]                  Radiology:    Imaging Results (Last 72 Hours)     ** No results found for the last 72 hours. **          Results for orders placed during the hospital encounter of 10/28/22    Adult Transesophageal Echo (TYRESE) W/ Cont if Necessary Per Protocol    Interpretation Summary  •  Indication for TYRESE -to rule out infective endocarditis in a patient with MRSA bacteremia.  •  Findings-  •  Left ventricular systolic function is normal. Estimated left ventricular EF = 60%  •  Left atrial appendage is well visualized and is free of thrombus.  •  Saline test was negative for the evidence of shunt in interatrial septum.  •  The tricuspid valve appeared normal in structure. There was a moderate sized echodense structure seen above  the posterior tricuspid leaflet but not attached to the leaflet. The appearance and location are not typical for regurgitation. This structure could be a normal variant. No evidence of tricuspid valve stenosis or significant regurgitations.  •  Other valves also appear normal in structure with no evidence of stenosis or significant regurgitations.  No vegetations seen on these valves.  •  There is no evidence of pericardial effusion.  •  Comment-  •  In view of presence of MRSA bacteremia, recommend to extend antibiotic therapy for possible infective endocarditis and repeat TYRESE in 3 months.      Assessment/Plan:   Generalized debility as evidenced by limited mobility status post prolonged hospitalization and orthostasis.  I have adjusted medication for orthostasis and he appears  to be improved at this time.  Continuing him on Florinef as well as midodrine.  He was able to exit ambulate with therapy today.  He is progressing well.  OT continues to work on motor skills, motor control and coordination, ADL training.    Orthostatic hypotension status post prolonged hospitalization with limited mobility, continue midodrine and Florinef, clinically improving.  Blood pressure and electrolytes appear to be tolerating this.    Anemia, stable, work-up has revealed probably combination of chronic disease combined with iron deficiency.  We will continue on iron supplementation orally    Diabetes, overall controlled, has had 1 hypoglycemic reading last night but otherwise adequate control, trying to avoid hypoglycemia, follow    MRSA bacteremia and septic arthritis, discussed with infectious disease this a.m., new target for antibiotic completion 11/21.  Continues therapy through this date.  Dr. Farfan is going to review the TYRESE for a second opinion, CPK normal November 6.  She continues on daptomycin.    DVT prophylaxis, Arixtra, platelets.  Be tolerating this.    History of [place] with thrombocytopenia and leukopenia, stable    Hypothyroidism, stable, continue Synthroid    Ja Bowers MD

## 2022-11-09 NOTE — PLAN OF CARE
Goal Outcome Evaluation:  Plan of Care Reviewed With: patient        Progress: improving  Outcome Evaluation: Patient up and participating with therapies. Making good progress. Continue with plan of care.

## 2022-11-09 NOTE — THERAPY TREATMENT NOTE
Inpatient Rehabilitation - Physical Therapy Treatment Note       YVONNE Gaines     Patient Name: Melchor Hardwick III  : 1965  MRN: [id number]    Today's Date: 2022                    Admit Date: 10/28/2022      Visit Dx:     ICD-10-CM ICD-9-CM   1. Staphylococcal arthritis of right knee (HCC)  M00.061 711.06     041.10       Patient Active Problem List   Diagnosis   • Hyperlipidemia   • Hypertensive disorder   • Obesity   • Type 2 diabetes mellitus with hyperglycemia, with long-term current use of insulin (HCC)   • Gas gangrene of foot (HCC)   • Cellulitis in diabetic foot (HCC)   • Other acute osteomyelitis of left foot (HCC)   • Osteomyelitis (HCC)   • Critical illness myopathy   • Staphylococcal arthritis of right knee (HCC)   • Other cirrhosis of liver (HCC)   • MRSA bacteremia   • Endocarditis of tricuspid valve   • Wound of right buttock   • History of osteomyelitis   • Debility       Past Medical History:   Diagnosis Date   • Diabetes mellitus (HCC)    • Hyperlipidemia    • Hypertension    • Pyogenic arthritis of right knee joint, due to unspecified organism (HCC) 2022       Past Surgical History:   Procedure Laterality Date   • ABSCESS DRAINAGE      PERINEUM   • AMPUTATION FOOT Left 2022    Procedure: AMPUTATION FOOT;  Surgeon: Addison Colby MD;  Location: [facility] OR;  Service: Podiatry;  Laterality: Left;   • INCISION AND DRAINAGE LEG Left 05/10/2022    Procedure: INCISION AND DRAINAGE LOWER EXTREMITY;  Surgeon: Addison Colby MD;  Location:  [facility] OR;  Service: Podiatry;  Laterality: Left;   • KNEE INCISION AND DRAINAGE Right 2022    Procedure: KNEE INCISION AND DRAINAGE RIGHT;  Surgeon: Brain Bentley MD;  Location: [facility] OR;  Service: Orthopedics;  Laterality: Right;   • WOUND DEBRIDEMENT Left 2022    Procedure: DEBRIDEMENT FOOT;  Surgeon: Addison Colby MD;  Location: [facility] OR;  Service: Podiatry;  Laterality: Left;       PT ASSESSMENT (last 12 hours)     IRF PT  Evaluation and Treatment     Row Name 11/09/22 1616          PT Time and Intention    Document Type daily treatment  -RF     Mode of Treatment physical therapy  -RF     Patient/Family/Caregiver Comments/Observations Pt continues to report severe LBP with increased activity and positional changes.  -RF     Row Name 11/09/22 1616          General Information    Patient Profile Reviewed yes  -RF     Existing Precautions/Restrictions fall;brace worn when out of bed  aircast L LE  -RF     Limitations/Impairments other (see comments)  ortho hypo  -RF     Row Name 11/09/22 1616          Pain Assessment    Pretreatment Pain Rating 6/10  knee, low back  -RF     Posttreatment Pain Rating 8/10  -RF     Row Name 11/09/22 1616          Cognition/Psychosocial    Affect/Mental Status (Cognition) WFL  -RF     Orientation Status (Cognition) oriented x 4  -RF     Follows Commands (Cognition) WFL  -RF     Personal Safety Interventions gait belt;nonskid shoes/slippers when out of bed;fall prevention program maintained  -RF     Row Name 11/09/22 1616          Mobility    Extremity Weight-bearing Status left lower extremity;right lower extremity  -RF     Left Lower Extremity (Weight-bearing Status) weight-bearing as tolerated (WBAT);other (see comments)  with brace/air cast  -RF     Right Lower Extremity (Weight-bearing Status) weight-bearing as tolerated (WBAT);other (see comments)  -RF     Row Name 11/09/22 1616          Bed Mobility    Bed Mobility supine-sit;sit-supine;scooting/bridging;rolling left;rolling right  -RF     Rolling Left [place] (Bed Mobility) modified independence  -RF     Rolling Right [place] (Bed Mobility) modified independence  -RF     Scooting/Bridging [place] (Bed Mobility) modified independence  -RF     Supine-Sit [place] (Bed Mobility) standby assist  -RF     Sit-Supine [place] (Bed Mobility) standby assist;supervision  -RF     Assistive Device (Bed Mobility) bed rails  -RF     Row Name  11/09/22 1616          Bed-Chair Transfer    Bed-Chair [place] (Transfers) contact guard;standby assist  -RF     Assistive Device (Bed-Chair Transfers) walker, front-wheeled  -RF     Row Name 11/09/22 1616          Chair-Bed Transfer    Chair-Bed [place] (Transfers) contact guard;standby assist  -RF     Assistive Device (Chair-Bed Transfers) wheelchair;walker, front-wheeled  -RF     Row Name 11/09/22 1616          Sit-Stand Transfer    Sit-Stand [place] (Transfers) nonverbal cues (demo/gesture);verbal cues;contact guard;standby assist  -RF     Assistive Device (Sit-Stand Transfers) walker, front-wheeled  -RF     Row Name 11/09/22 1616          Stand-Sit Transfer    Stand-Sit [place] (Transfers) contact guard;verbal cues;nonverbal cues (demo/gesture);standby assist  -RF     Assistive Device (Stand-Sit Transfers) wheelchair;walker, front-wheeled  -RF     Row Name 11/09/22 1616          Gait/Stairs (Locomotion)    [place] Level (Gait) contact guard;nonverbal cues (demo/gesture);verbal cues  -RF     Assistive Device (Gait) walker, front-wheeled  -RF     Distance in Feet (Gait) 80X2 in AM, 20 in PM  -RF     Right Sided Gait Deviations heel strike decreased;weight shift ability decreased  Decreased R knee extension  -RF     Comment, (Gait/Stairs) Fair ambulation quality noted; cues provided for increased HS on RLE.  -RF     Row Name 11/09/22 1616          Hip (Therapeutic Exercise)    Hip Strengthening (Therapeutic Exercise) bilateral;flexion;extension;aBduction;aDduction;heel slides;marching while seated;sitting;2 lb free weight;resistance band;green;2 sets;10 repetitions  #2 on LLE; BID  -RF     Row Name 11/09/22 1616          Knee (Therapeutic Exercise)    Knee AAROM (Therapeutic Exercise) right;extension;sitting;2 sets;10 repetitions;other (see comments)  supine hang with heel prop; OP applied  -RF     Knee PROM (Therapeutic Exercise) right;flexion;extension;sitting;10 second hold;10  repetitions;other (see comments)  PROM knee extension; SB knee flex with OP  -RF     Knee Strengthening (Therapeutic Exercise) bilateral;flexion;extension;heel slides;marching while seated;LAQ (long arc quad);hamstring curls;sitting;2 lb free weight;resistance band;green;2 sets;10 repetitions  #2 on LLE; BID  -RF     Row Name 11/09/22 1616          Modality Intervention (Comprehensive)    Modality Treatment cryotherapy  -RF     Row Name 11/09/22 1616          Positioning and Restraints    Pre-Treatment Position in bed  AM, chair in PM  -RF     In Bed supine;call light within reach;encouraged to call for assist  PM  -RF     In Chair sitting;with OT  AM  -RF     Row Name 11/09/22 1616          Vital Signs    Intra Systolic BP Rehab 113  after ambulation in AM  -RF     Intra Treatment Diastolic BP 63  -RF     Row Name 11/09/22 1616          Therapy Assessment/Plan (PT)    Rehab Potential/Prognosis (PT) good, to achieve stated therapy goals  -RF     Frequency of Treatment (PT) 5 times per week;90 minutes per session  -RF     Estimated Duration of Therapy (PT) 2 weeks;3 weeks  -RF     Row Name 11/09/22 1616          Daily Progress Summary (PT)    Functional Goal Overall Progress (PT) progressing toward functional goals as expected  -RF     Daily Progress Summary (PT) Increased ambulation distance noted this date with use of RW. Increased R knee PRom observed as well. LBP continues to hinder activity this date. Continued skilled care required for further improvements to ensure maximum safe function upon D/C.  -RF     Impairments Still Limiting Function (PT) flexibility decreased;functional activity tolerance impairment;pain;range of motion deficit;strength deficit  -RF     Recommendations (PT) Continue per current POC  -RF     Row Name 11/09/22 1616          Therapy Plan Review/Discharge Plan (PT)    Anticipated Discharge Disposition (PT) home with assist;home with supervision;home with home health  -RF     Row Name  11/09/22 1616          IRF PT Goals    Bed Mobility Goal Selection (PT-IRF) bed mobility, PT goal 1  -RF     Gait (Walking Locomotion) Goal Selection (PT-IRF) gait, PT goal 1  -RF     Row Name 11/09/22 1616          Bed Mobility Goal 1 (PT-IRF)    Activity/Assistive Device (Bed Mobility Goal 1, PT-IRF) bed mobility activities, all  -RF     [place] Level (Bed Mobility Goal 1, PT-IRF) independent  -RF     Time Frame (Bed Mobility Goal 1, PT-IRF) long-term goal (LTG)  -RF     Progress/Outcomes (Bed Mobility Goal 1, PT-IRF) goal met  -RF     Row Name 11/09/22 1616          Gait/Walking Locomotion Goal 1 (PT-IRF)    Activity/Assistive Device (Gait/Walking Locomotion Goal 1, PT-IRF) gait (walking locomotion);assistive device use  -RF     Gait/Walking Locomotion Distance Goal 1 (PT-IRF) 30'  -RF     [place] Level (Gait/Walking Locomotion Goal 1, PT-IRF) standby assist;modified independence  -RF     Time Frame (Gait/Walking Locomotion Goal 1, PT-IRF) long-term goal (LTG)  -RF     Progress/Outcomes (Gait/Walking Locomotion Goal 1, PT-IRF) goal ongoing  -RF           User Key  (r) = Recorded By, (t) = Taken By, (c) = Cosigned By    Initials Name Provider Type    RF Meron Lei, PTA Physical Therapist Assistant              Wound 09/21/22 1532 Right anterior knee Incision (Active)   Dressing Appearance open to air 11/09/22 0829   Closure Approximated 11/09/22 0829   Base dry;clean 11/09/22 0829   Drainage Amount none 11/08/22 1904   Care, Wound cleansed with 11/09/22 0829   Dressing Care open to air 11/09/22 0829       Wound 10/28/22 1827 Right coccyx Pressure Injury (Active)   Dressing Appearance open to air 11/09/22 0829   Closure Open to air 11/09/22 0829   Base red;non-blanchable 11/09/22 0829   Drainage Amount none 11/09/22 0829   Care, Wound barrier applied;pressure removed 11/09/22 0829   Dressing Care open to air 11/09/22 0829     Physical Therapy Education     Title: PT OT SLP Therapies (Done)      Topic: Physical Therapy (Done)     Point: Mobility training (Done)     Learning Progress Summary           Patient Acceptance, E,TB, VU by RF at 11/9/2022 1622    Acceptance, E,TB, VU by DG at 11/9/2022 0050    Acceptance, E,TB, VU by RF at 11/8/2022 1539    Acceptance, E,TB, VU by DG at 11/8/2022 0114    Acceptance, E,TB, VU by RF at 11/7/2022 1607    Acceptance, E,TB, VU by DG at 11/6/2022 2023    Acceptance, E,TB, VU by RF at 11/4/2022 1551    Acceptance, E,TB, VU,DU by HR at 11/3/2022 1541    Acceptance, E,D, VU,NR by RG at 11/3/2022 1442    Acceptance, E,TB, VU by DG at 11/1/2022 2016    Acceptance, E,D, VU,NR by RG at 11/1/2022 1541    Acceptance, E,TB, VU by DG at 10/31/2022 2315    Acceptance, E,D, VU,NR by RG at 10/31/2022 1446                   Point: Home exercise program (Done)     Learning Progress Summary           Patient Acceptance, E,TB, VU by RF at 11/9/2022 1622    Acceptance, E,TB, VU by DG at 11/9/2022 0050    Acceptance, E,TB, VU by RF at 11/8/2022 1539    Acceptance, E,TB, VU by DG at 11/8/2022 0114    Acceptance, E,TB, VU by RF at 11/7/2022 1607    Acceptance, E,TB, VU by DG at 11/6/2022 2023    Acceptance, E,TB, VU by RF at 11/4/2022 1551    Acceptance, E,TB, VU,DU by HR at 11/3/2022 1541    Acceptance, E,D, VU,NR by RG at 11/3/2022 1442    Acceptance, E,TB, VU by DG at 11/1/2022 2016    Acceptance, E,D, VU,NR by RG at 11/1/2022 1541    Acceptance, E,TB, VU by DG at 10/31/2022 2315    Acceptance, E,D, VU,NR by RG at 10/31/2022 1446                   Point: Body mechanics (Done)     Learning Progress Summary           Patient Acceptance, E,TB, VU by RF at 11/9/2022 1622    Acceptance, E,TB, VU by DG at 11/9/2022 0050    Acceptance, E,TB, VU by RF at 11/8/2022 1539    Acceptance, E,TB, VU by DG at 11/8/2022 0114    Acceptance, E,TB, VU by RF at 11/7/2022 1607    Acceptance, E,TB, VU by DG at 11/6/2022 2023    Acceptance, E,TB, VU by RF at 11/4/2022 1551    Acceptance, E,TB, VU,DU by HR at  11/3/2022 1541    Acceptance, E,D, VU,NR by RG at 11/3/2022 1442    Acceptance, E,TB, VU by DG at 11/1/2022 2016    Acceptance, E,D, VU,NR by RG at 11/1/2022 1541    Acceptance, E,TB, VU by DG at 10/31/2022 2315    Acceptance, E,D, VU,NR by RG at 10/31/2022 1446                   Point: Precautions (Done)     Learning Progress Summary           Patient Acceptance, E,TB, VU by RF at 11/9/2022 1622    Acceptance, E,TB, VU by DG at 11/9/2022 0050    Acceptance, E,TB, VU by RF at 11/8/2022 1539    Acceptance, E,TB, VU by DG at 11/8/2022 0114    Acceptance, E,TB, VU by RF at 11/7/2022 1607    Acceptance, E,TB, VU by DG at 11/6/2022 2023    Acceptance, E,TB, VU by RF at 11/4/2022 1551    Acceptance, E,TB, VU,DU by HR at 11/3/2022 1541    Acceptance, E,D, VU,NR by RG at 11/3/2022 1442    Acceptance, E,TB, VU by DG at 11/1/2022 2016    Acceptance, E,D, VU,NR by RG at 11/1/2022 1541    Acceptance, E,TB, VU by DG at 10/31/2022 2315    Acceptance, E,D, VU,NR by RG at 10/31/2022 1446                               User Key     Initials Effective Dates Name Provider Type Discipline     06/16/21 -  Phyllis Yañez RN Registered Nurse Nurse     06/16/21 -  Meron Lei PTA Physical Therapist Assistant PT     06/16/21 -  Michi Mckeon PTA Physical Therapist Assistant PT    HR 01/14/22 -  Hanna Murphy PTA Physical Therapist Assistant PT                PT Recommendation and Plan    Frequency of Treatment (PT): 5 times per week, 90 minutes per session     Daily Progress Summary (PT)  Functional Goal Overall Progress (PT): progressing toward functional goals as expected  Daily Progress Summary (PT): Increased ambulation distance noted this date with use of RW. Increased R knee PRom observed as well. LBP continues to hinder activity this date. Continued skilled care required for further improvements to ensure maximum safe function upon D/C.  Impairments Still Limiting Function (PT): flexibility decreased, functional  activity tolerance impairment, pain, range of motion deficit, strength deficit  Recommendations (PT): Continue per current POC               Time Calculation:      PT Charges     Row Name 11/09/22 1624 11/09/22 1623          Time Calculation    Start Time 1245  -RF 0915  -RF     Stop Time 1330  -RF 1000  -RF     Time Calculation (min) 45 min  -RF 45 min  -RF     PT Received On 11/09/22  -RF 11/09/22  -RF     PT - Next Appointment 11/10/22  -RF 11/09/22  -RF     PT Goal Re-Cert Due Date 11/11/22  -RF 11/11/22  -RF        Time Calculation- PT    Total Timed Code Minutes- PT 45 minute(s)  -RF 45 minute(s)  -RF           User Key  (r) = Recorded By, (t) = Taken By, (c) = Cosigned By    Initials Name Provider Type    RF Meron Lei PTA Physical Therapist Assistant                Therapy Charges for Today     Code Description Service Date Service Provider Modifiers Qty    44197438201 HC GAIT TRAINING EA 15 MIN 11/8/2022 Meron Lei, PTA GP, CQ 1    42490056063 HC PT THER PROC EA 15 MIN 11/8/2022 Meron Lei, PTA GP, CQ 3    44409169048 HC PT THERAPEUTIC ACT EA 15 MIN 11/8/2022 Meron Lei, PTA GP, CQ 2    37049036113 HC GAIT TRAINING EA 15 MIN 11/9/2022 Meron Lei, PTA GP, CQ 2    88263456563 HC PT NEUROMUSC RE EDUCATION EA 15 MIN 11/9/2022 Meron Lei, PTA GP, CQ 1    52185223657 HC PT THER PROC EA 15 MIN 11/9/2022 Meron Lei, PTA GP, CQ 2    50713550653 HC PT THERAPEUTIC ACT EA 15 MIN 11/9/2022 Meron Lei, PTA GP, CQ 1                   Meron Lei, PTA  11/9/2022

## 2022-11-09 NOTE — PLAN OF CARE
Goal Outcome Evaluation:               Resting quietly in bed with no acute distress noted. Continue current plan of care.

## 2022-11-09 NOTE — PROGRESS NOTES
Rehabilitation Nursing  Inpatient Rehabilitation Plan of Care Note    Plan of Care  Copy from Junie    Pain Management (Active)  Current Status (10/28/2022 5:07:00 PM): potential for increased pain  Weekly Goal: pain at a tolerable level  Discharge Goal: no pain    Body Function Structure    Skin Integrity (Active)  Current Status (10/28/2022 5:02:00 PM): impaired skin integrity  Weekly Goal: improved skin integrity  Discharge Goal: wound healing    Safety    Potential for Injury (Active)  Current Status (10/28/2022 5:07:00 PM): potential for falls  Weekly Goal: no falls  Discharge Goal: no falls    Signed by: Phyllis Yañze, Nurse

## 2022-11-09 NOTE — SIGNIFICANT NOTE
11/09/22 1558   Plan   Plan Dr. Giordano with ID recommends to continue inpatient stay to complete course of I.V. Daptomycin and suggests discharge on 11-22-22.  Rehab MD is agreeable to this plan.  Discussed these plans with pt and sister who are agreeable.  Reviewed with sister how pt is progressing in therapy.  Pt will return home with sister and brother-in-law at discharge.   Will follow.   Patient/Family in Agreement with Plan yes

## 2022-11-09 NOTE — THERAPY TREATMENT NOTE
Inpatient Rehabilitation - Occupational Therapy Treatment Note    YVONNE Gaines     Patient Name: Melchor Hardwick III  : 1965  MRN: [id number]    Today's Date: 2022                 Admit Date: 10/28/2022         ICD-10-CM ICD-9-CM   1. Staphylococcal arthritis of right knee (HCC)  M00.061 711.06     041.10       Patient Active Problem List   Diagnosis   • Hyperlipidemia   • Hypertensive disorder   • Obesity   • Type 2 diabetes mellitus with hyperglycemia, with long-term current use of insulin (HCC)   • Gas gangrene of foot (HCC)   • Cellulitis in diabetic foot (HCC)   • Other acute osteomyelitis of left foot (HCC)   • Osteomyelitis (HCC)   • Critical illness myopathy   • Staphylococcal arthritis of right knee (HCC)   • Other cirrhosis of liver (HCC)   • MRSA bacteremia   • Endocarditis of tricuspid valve   • Wound of right buttock   • History of osteomyelitis   • Debility       Past Medical History:   Diagnosis Date   • Diabetes mellitus (HCC)    • Hyperlipidemia    • Hypertension    • Pyogenic arthritis of right knee joint, due to unspecified organism (HCC) 2022       Past Surgical History:   Procedure Laterality Date   • ABSCESS DRAINAGE      PERINEUM   • AMPUTATION FOOT Left 2022    Procedure: AMPUTATION FOOT;  Surgeon: Addison Colby MD;  Location: [facility] OR;  Service: Podiatry;  Laterality: Left;   • INCISION AND DRAINAGE LEG Left 05/10/2022    Procedure: INCISION AND DRAINAGE LOWER EXTREMITY;  Surgeon: Addison Colby MD;  Location: [facility] OR;  Service: Podiatry;  Laterality: Left;   • KNEE INCISION AND DRAINAGE Right 2022    Procedure: KNEE INCISION AND DRAINAGE RIGHT;  Surgeon: Brain Bentley MD;  Location: [facility] OR;  Service: Orthopedics;  Laterality: Right;   • WOUND DEBRIDEMENT Left 2022    Procedure: DEBRIDEMENT FOOT;  Surgeon: Addison Colby MD;  Location: [facility] OR;  Service: Podiatry;  Laterality: Left;             IRF OT ASSESSMENT FLOWSHEET (last 12 hours)      IRF OT Evaluation and Treatment     Row Name 11/09/22 1227          OT Time and Intention    Document Type daily treatment  -HB     Mode of Treatment individual therapy;occupational therapy  -HB     Total Minutes, Occupational Therapy 90  -HB     Patient Effort good  -HB     Symptoms Noted During/After Treatment none  -HB     Row Name 11/09/22 1227          General Information    Patient Profile Reviewed yes  -HB     Patient/Family/Caregiver Comments/Observations Patient and RN okd OT this date.  -HB     General Observations of Patient Patient tolerated OT well with no adverse reactions. Pt reports feeling better this date.  -HB     Existing Precautions/Restrictions fall;brace worn when out of bed  LLE  -HB     Row Name 11/09/22 1227          Pain Assessment    Pretreatment Pain Rating 0/10 - no pain  -HB     Posttreatment Pain Rating 0/10 - no pain  -HB     Row Name 11/09/22 1227          Cognition/Psychosocial    Affect/Mental Status (Cognition) WFL  -     Orientation Status (Cognition) oriented x 4  -HB     Follows Commands (Cognition) WFL  -     Row Name 11/09/22 1227          Grooming    Graves Level (Grooming) grooming skills;set up  -HB     Position (Grooming) supported standing  -     Row Name 11/09/22 1227          Self-Feeding    [place] Level (Self-Feeding) feeding skills;set up  -HB     Position (Self-Feeding) sitting up in bed  -     Row Name 11/09/22 1227          Motor Skills    Motor Skills coordination;functional endurance;motor control/coordination interventions  -     Motor Control/Coordination Interventions fine motor manipulation/dexterity activities;gross motor coordination activities;therapeutic exercise/ROM  -HB     Therapeutic Exercise shoulder;elbow/forearm;wrist;hand  -HB     Additional Documentation --  PRE 3 min and 13 min; BUE TA to increase ADL status/endurance; rest between tasks. Increased endurance noted.  -     Row Name 11/09/22 1227          Positioning and  Restraints    Pre-Treatment Position sitting in chair/recliner  -HB     Post Treatment Position wheelchair  -HB     In Bed call light within reach;encouraged to call for assist  lunch setup  -HB           User Key  (r) = Recorded By, (t) = Taken By, (c) = Cosigned By    Initials Name Effective Dates    CLARITZA Dorothy Rosas, OT 05/25/21 -                  Occupational Therapy Education     Title: PT OT SLP Therapies (Done)     Topic: Occupational Therapy (Done)     Point: ADL training (Done)     Description:   Instruct learner(s) on proper safety adaptation and remediation techniques during self care or transfers.   Instruct in proper use of assistive devices.              Learning Progress Summary           Patient Acceptance, E, VU,NR by HB at 11/9/2022 1230    Acceptance, E,TB, VU by DG at 11/9/2022 0050    Acceptance, E, VU,NR by HB at 11/8/2022 1426    Acceptance, E,TB, VU by DG at 11/8/2022 0114    Acceptance, E, VU,NR by HB at 11/7/2022 1155    Acceptance, E,TB, VU by DG at 11/6/2022 2023    Acceptance, E, VU,NR by HB at 11/4/2022 1153    Acceptance, E, VU,NR by HB at 11/3/2022 1428    Acceptance, E,TB, VU by DG at 11/1/2022 2016    Acceptance, E,TB, VU by DG at 10/31/2022 2315    Acceptance, E, VU,NR by BF at 10/31/2022 1429    Acceptance, E,TB, VU by DL at 10/31/2022 0101    Acceptance, E,TB, VU by DL at 10/29/2022 2321    Acceptance, E, VU,NR by HB at 10/29/2022 1137                   Point: Home exercise program (Done)     Description:   Instruct learner(s) on appropriate technique for monitoring, assisting and/or progressing therapeutic exercises/activities.              Learning Progress Summary           Patient Acceptance, E, VU,NR by HB at 11/9/2022 1230    Acceptance, E,TB, VU by DG at 11/9/2022 0050    Acceptance, E, VU,NR by HB at 11/8/2022 1426    Acceptance, E,TB, VU by DG at 11/8/2022 0114    Acceptance, E, VU,NR by HB at 11/7/2022 1155    Acceptance, ETB, VU by BRYSON at 11/6/2022 2023    Acceptance,  E, VU,NR by HB at 11/4/2022 1153    Acceptance, E, VU,NR by HB at 11/3/2022 1428    Acceptance, E,TB, VU by DG at 11/1/2022 2016    Acceptance, E,TB, VU by DG at 10/31/2022 2315    Acceptance, E,TB, VU by DL at 10/31/2022 0101    Acceptance, E,TB, VU by DL at 10/29/2022 2321    Acceptance, E, VU,NR by HB at 10/29/2022 1137                   Point: Precautions (Done)     Description:   Instruct learner(s) on prescribed precautions during self-care and functional transfers.              Learning Progress Summary           Patient Acceptance, E, VU,NR by HB at 11/9/2022 1230    Acceptance, E,TB, VU by DG at 11/9/2022 0050    Acceptance, E, VU,NR by HB at 11/8/2022 1426    Acceptance, E,TB, VU by DG at 11/8/2022 0114    Acceptance, E, VU,NR by HB at 11/7/2022 1155    Acceptance, E,TB, VU by DG at 11/6/2022 2023    Acceptance, E, VU,NR by HB at 11/4/2022 1153    Acceptance, E, VU,NR by HB at 11/3/2022 1428    Acceptance, E,TB, VU by DG at 11/1/2022 2016    Acceptance, E,TB, VU by DG at 10/31/2022 2315    Acceptance, E, VU,NR by BF at 10/31/2022 1429    Acceptance, E,TB, VU by DL at 10/31/2022 0101    Acceptance, E,TB, VU by DL at 10/29/2022 2321    Acceptance, E, VU,NR by HB at 10/29/2022 1137                   Point: Body mechanics (Done)     Description:   Instruct learner(s) on proper positioning and spine alignment during self-care, functional mobility activities and/or exercises.              Learning Progress Summary           Patient Acceptance, E, VU,NR by HB at 11/9/2022 1230    Acceptance, E,TB, VU by DG at 11/9/2022 0050    Acceptance, E, VU,NR by HB at 11/8/2022 1426    Acceptance, E,TB, VU by DG at 11/8/2022 0114    Acceptance, E, VU,NR by HB at 11/7/2022 1155    Acceptance, E,TB, VU by DG at 11/6/2022 2023    Acceptance, E, VU,NR by HB at 11/4/2022 1153    Acceptance, E, VU,NR by HB at 11/3/2022 1428    Acceptance, E,TB, VU by DG at 11/1/2022 2016    Acceptance, E,TB, VU by DG at 10/31/2022 6098     Acceptance, E,TB, VU by DL at 10/31/2022 0101    Acceptance, E,TB, VU by DL at 10/29/2022 2321    Acceptance, E, VU,NR by HB at 10/29/2022 1137                               User Key     Initials Effective Dates Name Provider Type Discipline    DG 06/16/21 -  Phyllis Yañez, RN Registered Nurse Nurse    BF 06/16/21 -  Mandi Smith OT Occupational Therapist OT    HB 05/25/21 -  Dorothy Rosas OT Occupational Therapist OT    DL 03/16/22 -  Hellen Mcdonnell, RN Registered Nurse Nurse                    OT Recommendation and Plan    Planned Therapy Interventions (OT): activity tolerance training, adaptive equipment training, BADL retraining, IADL retraining, patient/caregiver education/training, strengthening exercise, transfer/mobility retraining                    Time Calculation:      Time Calculation- OT     Row Name 11/09/22 1230             Time Calculation- OT    OT Start Time 1000  -HB      OT Stop Time 1130  -HB      OT Time Calculation (min) 90 min  -HB      Total Timed Code Minutes- OT 90 minute(s)  -HB      OT Non-Billable Time (min) 10 min  -HB            User Key  (r) = Recorded By, (t) = Taken By, (c) = Cosigned By    Initials Name Provider Type     Dorothy Rosas OT Occupational Therapist              Therapy Charges for Today     Code Description Service Date Service Provider Modifiers Qty    38088130518 HC OT SELF CARE/MGMT/TRAIN EA 15 MIN 11/8/2022 Dorothy Rosas OT GO 1    70081563519 HC OT THER PROC EA 15 MIN 11/8/2022 Dorothy Rosas OT GO 2    31597802815 HC OT THERAPEUTIC ACT EA 15 MIN 11/8/2022 Dorothy Rosas OT GO 3    12922427519 HC OT SELF CARE/MGMT/TRAIN EA 15 MIN 11/9/2022 Dorothy Rosas OT GO 1    16124245106 HC OT THER PROC EA 15 MIN 11/9/2022 Dorothy Rosas OT GO 2    80799795339 HC OT THERAPEUTIC ACT EA 15 MIN 11/9/2022 Dorothy Rosas OT GO 3                   Dorothy Rosas OT  11/9/2022

## 2022-11-10 LAB
ANION GAP SERPL CALCULATED.3IONS-SCNC: 10.3 MMOL/L (ref 5–15)
BASOPHILS # BLD AUTO: 0.01 10*3/MM3 (ref 0–0.2)
BASOPHILS NFR BLD AUTO: 0.3 % (ref 0–1.5)
BUN SERPL-MCNC: 11 MG/DL (ref 6–20)
BUN/CREAT SERPL: 17.7 (ref 7–25)
CALCIUM SPEC-SCNC: 8.4 MG/DL (ref 8.6–10.5)
CHLORIDE SERPL-SCNC: 103 MMOL/L (ref 98–107)
CO2 SERPL-SCNC: 23.7 MMOL/L (ref 22–29)
CREAT SERPL-MCNC: 0.62 MG/DL (ref 0.76–1.27)
DEPRECATED RDW RBC AUTO: 55.8 FL (ref 37–54)
EGFRCR SERPLBLD CKD-EPI 2021: 111.5 ML/MIN/1.73
EOSINOPHIL # BLD AUTO: 0.07 10*3/MM3 (ref 0–0.4)
EOSINOPHIL NFR BLD AUTO: 2.3 % (ref 0.3–6.2)
ERYTHROCYTE [DISTWIDTH] IN BLOOD BY AUTOMATED COUNT: 18 % (ref 12.3–15.4)
GLUCOSE BLDC GLUCOMTR-MCNC: 250 MG/DL (ref 70–130)
GLUCOSE BLDC GLUCOMTR-MCNC: 253 MG/DL (ref 70–130)
GLUCOSE BLDC GLUCOMTR-MCNC: 76 MG/DL (ref 70–130)
GLUCOSE SERPL-MCNC: 110 MG/DL (ref 65–99)
HCT VFR BLD AUTO: 29 % (ref 37.5–51)
HGB BLD-MCNC: 9 G/DL (ref 13–17.7)
IMM GRANULOCYTES # BLD AUTO: 0 10*3/MM3 (ref 0–0.05)
IMM GRANULOCYTES NFR BLD AUTO: 0 % (ref 0–0.5)
LYMPHOCYTES # BLD AUTO: 0.93 10*3/MM3 (ref 0.7–3.1)
LYMPHOCYTES NFR BLD AUTO: 30.9 % (ref 19.6–45.3)
MCH RBC QN AUTO: 26 PG (ref 26.6–33)
MCHC RBC AUTO-ENTMCNC: 31 G/DL (ref 31.5–35.7)
MCV RBC AUTO: 83.8 FL (ref 79–97)
MONOCYTES # BLD AUTO: 0.29 10*3/MM3 (ref 0.1–0.9)
MONOCYTES NFR BLD AUTO: 9.6 % (ref 5–12)
NEUTROPHILS NFR BLD AUTO: 1.71 10*3/MM3 (ref 1.7–7)
NEUTROPHILS NFR BLD AUTO: 56.9 % (ref 42.7–76)
NRBC BLD AUTO-RTO: 0 /100 WBC (ref 0–0.2)
PLATELET # BLD AUTO: 97 10*3/MM3 (ref 140–450)
PMV BLD AUTO: 9.9 FL (ref 6–12)
POTASSIUM SERPL-SCNC: 3.9 MMOL/L (ref 3.5–5.2)
RBC # BLD AUTO: 3.46 10*6/MM3 (ref 4.14–5.8)
SODIUM SERPL-SCNC: 137 MMOL/L (ref 136–145)
WBC NRBC COR # BLD: 3.01 10*3/MM3 (ref 3.4–10.8)

## 2022-11-10 PROCEDURE — 25010000002 DAPTOMYCIN PER 1 MG: Performed by: INTERNAL MEDICINE

## 2022-11-10 PROCEDURE — 63710000001 INSULIN ASPART PER 5 UNITS: Performed by: INTERNAL MEDICINE

## 2022-11-10 PROCEDURE — 25010000002 FONDAPARINUX PER 0.5 MG: Performed by: FAMILY MEDICINE

## 2022-11-10 PROCEDURE — 97110 THERAPEUTIC EXERCISES: CPT | Performed by: OCCUPATIONAL THERAPIST

## 2022-11-10 PROCEDURE — 97535 SELF CARE MNGMENT TRAINING: CPT | Performed by: OCCUPATIONAL THERAPIST

## 2022-11-10 PROCEDURE — 97530 THERAPEUTIC ACTIVITIES: CPT

## 2022-11-10 PROCEDURE — 80048 BASIC METABOLIC PNL TOTAL CA: CPT | Performed by: INTERNAL MEDICINE

## 2022-11-10 PROCEDURE — 63710000001 INSULIN DETEMIR PER 5 UNITS: Performed by: INTERNAL MEDICINE

## 2022-11-10 PROCEDURE — 97116 GAIT TRAINING THERAPY: CPT

## 2022-11-10 PROCEDURE — 97110 THERAPEUTIC EXERCISES: CPT

## 2022-11-10 PROCEDURE — 97530 THERAPEUTIC ACTIVITIES: CPT | Performed by: OCCUPATIONAL THERAPIST

## 2022-11-10 PROCEDURE — 82962 GLUCOSE BLOOD TEST: CPT

## 2022-11-10 PROCEDURE — 85025 COMPLETE CBC W/AUTO DIFF WBC: CPT | Performed by: INTERNAL MEDICINE

## 2022-11-10 RX ADMIN — Medication 1 TABLET: at 09:06

## 2022-11-10 RX ADMIN — CARBIDOPA AND LEVODOPA 10 MG: 50; 200 TABLET, EXTENDED RELEASE ORAL at 07:35

## 2022-11-10 RX ADMIN — HYDROCODONE BITARTRATE AND ACETAMINOPHEN 1 TABLET: 5; 325 TABLET ORAL at 09:07

## 2022-11-10 RX ADMIN — INSULIN ASPART 8 UNITS: 100 INJECTION, SOLUTION INTRAVENOUS; SUBCUTANEOUS at 16:53

## 2022-11-10 RX ADMIN — INSULIN DETEMIR 23 UNITS: 100 INJECTION, SOLUTION SUBCUTANEOUS at 08:49

## 2022-11-10 RX ADMIN — PREGABALIN 100 MG: 100 CAPSULE ORAL at 21:21

## 2022-11-10 RX ADMIN — Medication 150 MG: at 09:07

## 2022-11-10 RX ADMIN — INSULIN DETEMIR 23 UNITS: 100 INJECTION, SOLUTION SUBCUTANEOUS at 21:21

## 2022-11-10 RX ADMIN — ACETAMINOPHEN 650 MG: 325 TABLET, FILM COATED ORAL at 11:31

## 2022-11-10 RX ADMIN — PREGABALIN 100 MG: 100 CAPSULE ORAL at 09:05

## 2022-11-10 RX ADMIN — PANTOPRAZOLE SODIUM 40 MG: 40 TABLET, DELAYED RELEASE ORAL at 05:30

## 2022-11-10 RX ADMIN — POLYETHYLENE GLYCOL (3350) 17 G: 17 POWDER, FOR SOLUTION ORAL at 09:07

## 2022-11-10 RX ADMIN — CARBIDOPA AND LEVODOPA 10 MG: 50; 200 TABLET, EXTENDED RELEASE ORAL at 11:26

## 2022-11-10 RX ADMIN — DOCUSATE SODIUM 100 MG: 100 CAPSULE ORAL at 21:21

## 2022-11-10 RX ADMIN — FLUDROCORTISONE ACETATE 50 MCG: 0.1 TABLET ORAL at 09:06

## 2022-11-10 RX ADMIN — FONDAPARINUX SODIUM 2.5 MG: 2.5 INJECTION, SOLUTION SUBCUTANEOUS at 09:06

## 2022-11-10 RX ADMIN — CARBIDOPA AND LEVODOPA 10 MG: 50; 200 TABLET, EXTENDED RELEASE ORAL at 16:53

## 2022-11-10 RX ADMIN — OXYCODONE HYDROCHLORIDE AND ACETAMINOPHEN 500 MG: 500 TABLET ORAL at 09:06

## 2022-11-10 RX ADMIN — LEVOTHYROXINE SODIUM 25 MCG: 0.07 TABLET ORAL at 05:30

## 2022-11-10 RX ADMIN — DOCUSATE SODIUM 100 MG: 100 CAPSULE ORAL at 09:06

## 2022-11-10 RX ADMIN — ACETAMINOPHEN 650 MG: 325 TABLET, FILM COATED ORAL at 21:20

## 2022-11-10 RX ADMIN — HYDROCODONE BITARTRATE AND ACETAMINOPHEN 1 TABLET: 5; 325 TABLET ORAL at 17:05

## 2022-11-10 RX ADMIN — ASPIRIN 81 MG: 81 TABLET, COATED ORAL at 09:06

## 2022-11-10 RX ADMIN — DAPTOMYCIN 500 MG: 500 INJECTION, POWDER, LYOPHILIZED, FOR SOLUTION INTRAVENOUS at 18:08

## 2022-11-10 RX ADMIN — INSULIN ASPART 8 UNITS: 100 INJECTION, SOLUTION INTRAVENOUS; SUBCUTANEOUS at 11:26

## 2022-11-10 NOTE — CONSULTS
"Diabetes Education  Assessment/Teaching    Patient Name:  Melchor Hardwick III  YOB: 1965  MRN: [id number]  Admit Date:  10/28/2022      Assessment Date:  11/10/2022  Flowsheet Row Most Recent Value   General Information     Height 177.8 cm (70\")   Height Method Stated   Weight 89.6 kg (197 lb 8.5 oz)   Weight Method Stated   Pregnancy Assessment    Diabetes History    Education Preferences    Nutrition Information    Assessment Topics    DM Goals           Flowsheet Row Most Recent Value   DM Education Needs    Meter Has own   Frequency of Testing 3 times a day   Medication Insulin, Oral   Problem Solving Hypoglycemia, Hyperglycemia, Sick days, Signs, Symptoms, Treatment   Reducing Risks Cardiovascular, Immunizations, Foot care, Dental exam, Eye exam, Blood pressure, Lipids, A1C testing, Neuropathy, Retinopathy   Healthy Eating Basic meal plan provided   Physical Activity Walking   Physical Activity Frequency Occasionally   Healthy Coping Appropriate   Discharge Plan Home, Follow-up with PCP   Motivation Moderate   Teaching Method Explanation, Discussion, Handouts   Patient Response Verbalized understanding            Other Comments:  A1C 8.8 Patient did not wear a mask. Patient was educated and received AADE7 and ADA handouts on diet, activity, checking blood glucose, taking medication as prescribed, checking feet daily and S/S of hypo/hyperglycemia. Patient was educated on sick rule days. Patient had no questions or concerns. Please re-consult or call for concerns or questions. Thank you.        Electronically signed by:  Apolonia Live RN  11/10/22 15:11 EST  "

## 2022-11-10 NOTE — NURSING NOTE
Patient declined when asked if he wanted us to notify family of him moving rooms. Said he had already texted them.

## 2022-11-10 NOTE — PROGRESS NOTES
No complaints or new events overnight.  Vitals are good, last glucose 250 which is hyperglycemic but before this glucose 7 good, will continue to monitor on current insulin.  I did discuss with Dr. Farfan last night, he agrees that the echo reading is accurate and patient should be treated as endocarditis.  He thinks that when antibiotics are completed the TYRESE should be repeated.  This finding was not there on the previous TYRESE in September which would make a mass less likely.  Orthostasis is improving on Florinef and midodrine.  Patient continues on daptomycin.  Checking weekly CPKs.

## 2022-11-10 NOTE — PAYOR COMM NOTE
"Jaja Ramirez, RN  Utilization Review Nurse for Inpatient Rehab  Phone: [phone]  Fax: [phone]  Email: [email or web address]  [facility] NPI: [id number]    CLINICAL REVIEW FOR CONTINUED REHAB STAY APPROVAL  Member: Melchor Hardwick  : 1965  Ref# [id number]  ID# [id number]  Admission Date: 10/28/22  Planning for Discharge home on 22  *Requesting additional 7 days    Melchor Hardwick III (57 y.o. Male)     Date of Birth   1965    Social Security Number       Address   [address] [id number]    Home Phone   [phone]    MRN   [id number]       J[employer]ism   [facility]    Marital Status   Single                            Admission Date   10/28/22    Admission Type   Elective    Admitting Provider   Ja Bowers MD    Attending Provider   Ja Bowers MD    Department, Room/Bed   [facility], 110/2S       Discharge Date       Discharge Disposition       Discharge Destination                               Attending Provider: Ja Bowers MD    Allergies: No Known Allergies    Isolation: None   Infection: MRSA/History Only (10/17/22)   Code Status: CPR    Ht: 177.8 cm (70\")   Wt: 89.6 kg (197 lb 8.5 oz)    Admission Cmt: None   Principal Problem: Debility [R53.81]               Corrina Mendez MSW     Case Management  Significant Note     Signed  Date of Service:  22 160  Creation Time:  22 160     Signed                         22 1558   Plan   Plan Dr. Giordano with ID recommends to continue inpatient stay to complete course of I.V. Daptomycin and suggests discharge on 22.  Rehab MD is agreeable to this plan.  Discussed these plans with pt and sister who are agreeable.  Reviewed with sister how pt is progressing in therapy.  Pt will return home with sister and brother-in-law at discharge.   Will follow.   Patient/Family in Agreement with Plan yes                 Ja Bowers MD "   Physician  Medicine  Progress Notes     Signed  Date of Service:  11/09/22 1355  Creation Time:  11/09/22 1355     Signed                                                                                                                                        Assisted By: Occupational therapy     CC: Follow-up on septic arthritis and bacteremia with generalized debility status post transmetatarsal amputation of his left foot in the past.     Interview History/HPI: Patient states she is feeling better today.  He was able to walk about 60 feet today.  He states he did not feel dizzy, he denies chest pain or shortness of breath and is tolerating his diet, currently working with occupational therapy and continues to state he feels good.              Current Hospital Meds:  ascorbic acid, 500 mg, Oral, Daily  aspirin, 81 mg, Oral, Daily  DAPTOmycin, 6 mg/kg (Adjusted), Intravenous, Q24H  docusate sodium, 100 mg, Oral, BID  fludrocortisone, 50 mcg, Oral, Daily  fondaparinux, 2.5 mg, Subcutaneous, Q24H  Insulin Aspart, 0-14 Units, Subcutaneous, TID AC  insulin detemir, 23 Units, Subcutaneous, Q12H  iron polysaccharides, 150 mg, Oral, Daily  levothyroxine, 25 mcg, Oral, Q AM  midodrine, 10 mg, Oral, TID AC  multivitamin with minerals, 1 tablet, Oral, Daily  pantoprazole, 40 mg, Oral, Q AM  polyethylene glycol, 17 g, Oral, Daily  pregabalin, 100 mg, Oral, Q12H     Pharmacy Consult,                Vitals:     11/09/22 0733   BP: 121/66   Pulse: 79   Resp: 18   Temp: 98 °F (36.7 °C)   SpO2: 100%            Intake/Output Summary (Last 24 hours) at 11/9/2022 1355  Last data filed at 11/9/2022 1215      Gross per 24 hour   Intake 1440 ml   Output 2400 ml   Net -960 ml         EXAM: He is well sitting at the Occupational Therapy table working with him color is good upper extremity strength appears to be symmetric, lungs about breath sounds are clear, heart regular rate and rhythm without murmur gallop.  He has a boot on his left  transmetatarsal amputation area.  Trace edema of his left lower extremity to 1+.        Diet Regular; Thin; Consistent Carbohydrate           LABS:               Lab Results (last 48 hours)      Procedure Component Value Units Date/Time     POC Glucose Once [[id number]]  (Abnormal) Collected: 11/09/22 1041     Specimen: Blood Updated: 11/09/22 1048       Glucose 191 mg/dL         Comment: Meter: [id number] : [id number] NADJA RANGEL        POC Glucose Once [[id number]]  (Normal) Collected: 11/09/22 0709     Specimen: Blood Updated: 11/09/22 0718       Glucose 105 mg/dL         Comment: Meter: [id number] : [id number] Tracey Crystal        Basic Metabolic Panel [[id number]]  (Abnormal) Collected: 11/09/22 0144     Specimen: Blood Updated: 11/09/22 0223       Glucose 181 mg/dL         BUN 12 mg/dL         Creatinine 0.82 mg/dL         Sodium 134 mmol/L         Potassium 4.2 mmol/L         Comment: Slight hemolysis detected by analyzer. Results may be affected.          Chloride 101 mmol/L         CO2 26.2 mmol/L         Calcium 8.4 mg/dL         BUN/Creatinine Ratio 14.6       Anion Gap 6.8 mmol/L         eGFR 102.5 mL/min/1.73         Comment: National Kidney Foundation and American Society of Nephrology (ASN) Task Force recommended calculation based on the Chronic Kidney Disease Epidemiology Collaboration (CKD-EPI) equation refit without adjustment for race.        Narrative:       GFR Normal >60  Chronic Kidney Disease <60  Kidney Failure <15        POC Glucose Once [[id number]]  (Abnormal) Collected: 11/08/22 2006     Specimen: Blood Updated: 11/08/22 2023       Glucose 261 mg/dL         Comment: Meter: [id number] : [id number] Raul Wu        POC Glucose Once [[id number]]  (Abnormal) Collected: 11/08/22 1624     Specimen: Blood Updated: 11/08/22 1648       Glucose 199 mg/dL         Comment: Meter: [id number] : [id number] Audrey Mcginnis        POC Glucose Once [[id number]]  (Abnormal) Collected:  11/08/22 1057     Specimen: Blood Updated: 11/08/22 1106       Glucose 204 mg/dL         Comment: Meter: [id number] : [id number] Audrey Ras        POC Glucose Once [[id number]]  (Abnormal) Collected: 11/08/22 0546     Specimen: Blood Updated: 11/08/22 0626       Glucose 207 mg/dL         Comment: Meter: [id number] : [id number] Tracey Crystal        Basic Metabolic Panel [[id number]]  (Abnormal) Collected: 11/08/22 0400     Specimen: Blood Updated: 11/08/22 0427       Glucose 257 mg/dL         BUN 14 mg/dL         Creatinine 0.78 mg/dL         Sodium 133 mmol/L         Potassium 4.2 mmol/L         Chloride 99 mmol/L         CO2 25.1 mmol/L         Calcium 8.3 mg/dL         BUN/Creatinine Ratio 17.9       Anion Gap 8.9 mmol/L         eGFR 104.0 mL/min/1.73         Comment: National Kidney Foundation and American Society of Nephrology (ASN) Task Force recommended calculation based on the Chronic Kidney Disease Epidemiology Collaboration (CKD-EPI) equation refit without adjustment for race.        Narrative:       GFR Normal >60  Chronic Kidney Disease <60  Kidney Failure <15        CBC & Differential [[id number]]  (Abnormal) Collected: 11/08/22 0135     Specimen: Blood Updated: 11/08/22 0152     Narrative:       The following orders were created for panel order CBC & Differential.  Procedure                               Abnormality         Status                     ---------                               -----------         ------                     CBC Auto Differential[[id number]]        Abnormal            Final result                  Please view results for these tests on the individual orders.     CBC Auto Differential [[id number]]  (Abnormal) Collected: 11/08/22 0135     Specimen: Blood Updated: 11/08/22 0152       WBC 3.10 10*3/mm3         RBC 3.34 10*6/mm3         Hemoglobin 8.8 g/dL         Hematocrit 28.5 %         MCV 85.3 fL         MCH 26.3 pg         MCHC 30.9 g/dL         RDW 18.6 %          RDW-SD 57.5 fl         MPV 11.8 fL         Platelets 110 10*3/mm3         Neutrophil % 60.3 %         Lymphocyte % 28.1 %         Monocyte % 9.0 %         Eosinophil % 2.3 %         Basophil % 0.0 %         Immature Grans % 0.3 %         Neutrophils, Absolute 1.87 10*3/mm3         Lymphocytes, Absolute 0.87 10*3/mm3         Monocytes, Absolute 0.28 10*3/mm3         Eosinophils, Absolute 0.07 10*3/mm3         Basophils, Absolute 0.00 10*3/mm3         Immature Grans, Absolute 0.01 10*3/mm3         nRBC 0.0 /100 WBC       POC Glucose Once [[id number]]  (Abnormal) Collected: 11/07/22 2002     Specimen: Blood Updated: 11/07/22 2008       Glucose 277 mg/dL         Comment: Meter: [id number] : [id number] YAMILET GAMA        POC Glucose Once [[id number]]  (Abnormal) Collected: 11/07/22 1614     Specimen: Blood Updated: 11/07/22 1630       Glucose 253 mg/dL         Comment: Meter: [id number] : [id number] Penelope Dawson                      Radiology:         Imaging Results (Last 72 Hours)      ** No results found for the last 72 hours. **             Results for orders placed during the hospital encounter of 10/28/22     Adult Transesophageal Echo (TYRESE) W/ Cont if Necessary Per Protocol     Interpretation Summary  •  Indication for TYRESE -to rule out infective endocarditis in a patient with MRSA bacteremia.  •  Findings-  •  Left ventricular systolic function is normal. Estimated left ventricular EF = 60%  •  Left atrial appendage is well visualized and is free of thrombus.  •  Saline test was negative for the evidence of shunt in interatrial septum.  •  The tricuspid valve appeared normal in structure. There was a moderate sized echodense structure seen above  the posterior tricuspid leaflet but not attached to the leaflet. The appearance and location are not typical for regurgitation. This structure could be a normal variant. No evidence of tricuspid valve stenosis or significant regurgitations.  •  Other valves  also appear normal in structure with no evidence of stenosis or significant regurgitations.  No vegetations seen on these valves.  •  There is no evidence of pericardial effusion.  •  Comment-  •  In view of presence of MRSA bacteremia, recommend to extend antibiotic therapy for possible infective endocarditis and repeat TYRESE in 3 months.        Assessment/Plan:   Generalized debility as evidenced by limited mobility status post prolonged hospitalization and orthostasis.  I have adjusted medication for orthostasis and he appears to be improved at this time.  Continuing him on Florinef as well as midodrine.  He was able to exit ambulate with therapy today.  He is progressing well.  OT continues to work on motor skills, motor control and coordination, ADL training.     Orthostatic hypotension status post prolonged hospitalization with limited mobility, continue midodrine and Florinef, clinically improving.  Blood pressure and electrolytes appear to be tolerating this.     Anemia, stable, work-up has revealed probably combination of chronic disease combined with iron deficiency.  We will continue on iron supplementation orally     Diabetes, overall controlled, has had 1 hypoglycemic reading last night but otherwise adequate control, trying to avoid hypoglycemia, follow     MRSA bacteremia and septic arthritis, discussed with infectious disease this a.m., new target for antibiotic completion 11/21.  Continues therapy through this date.  Dr. Farfan is going to review the TYRESE for a second opinion, CPK normal November 6.  She continues on daptomycin.     DVT prophylaxis, Arixtra, platelets.  Be tolerating this.     History of [place] with thrombocytopenia and leukopenia, stable     Hypothyroidism, stable, continue Synthroid     MD Quique Bradshaw Rebecca J, PTA   Physical Therapist Assistant  Physical Therapy  Therapy Treatment Note     Signed  Date of Service:  11/09/22 1626  Creation Time:  11/09/22  1626     Signed        Expand All Collapse All                                                                                                                                                                                                                                Inpatient Rehabilitation - Physical Therapy Treatment Note                                                              YVONNE Eder     Patient Name: Melchor Hardwick III                  : 1965                      MRN: [id number]     Today's Date: 2022                                                                                            Admit Date: 10/28/2022                        Visit Dx:   Visit Diagnosis       ICD-10-CM ICD-9-CM   1. Staphylococcal arthritis of right knee (HCC)  M00.061 711.06       041.10                  PT ASSESSMENT (last 12 hours)                IRF PT Evaluation and Treatment             Row Name 22 1616                   PT Time and Intention      Document Type daily treatment  -RF        Mode of Treatment physical therapy  -RF        Patient/Family/Caregiver Comments/Observations Pt continues to report severe LBP with increased activity and positional changes.  -RF               Row Name 22 1616                   General Information      Patient Profile Reviewed yes  -RF        Existing Precautions/Restrictions fall;brace worn when out of bed  aircast L LE  -RF        Limitations/Impairments other (see comments)  ortho hypo  -RF               Row Name 22 1616                   Pain Assessment      Pretreatment Pain Rating 6/10  knee, low back  -RF        Posttreatment Pain Rating 8/10  -RF               Row Name 22 1616                   Cognition/Psychosocial      Affect/Mental Status (Cognition) WFL  -RF        Orientation Status (Cognition) oriented x 4  -RF        Follows Commands (Cognition) WFL  -RF        Personal Safety Interventions gait belt;nonskid shoes/slippers when out  of bed;fall prevention program maintained  -               Row Name 11/09/22 1616                   Mobility      Extremity Weight-bearing Status left lower extremity;right lower extremity  -RF        Left Lower Extremity (Weight-bearing Status) weight-bearing as tolerated (WBAT);other (see comments)  with brace/air cast  -RF        Right Lower Extremity (Weight-bearing Status) weight-bearing as tolerated (WBAT);other (see comments)  -RF               Row Name 11/09/22 1616                   Bed Mobility      Bed Mobility supine-sit;sit-supine;scooting/bridging;rolling left;rolling right  -RF        Rolling Left [place] (Bed Mobility) modified independence  -RF        Rolling Right [place] (Bed Mobility) modified independence  -RF        Scooting/Bridging [place] (Bed Mobility) modified independence  -RF        Supine-Sit [place] (Bed Mobility) standby assist  -RF        Sit-Supine [place] (Bed Mobility) standby assist;supervision  -RF        Assistive Device (Bed Mobility) bed rails  -               Row Name 11/09/22 1616                   Bed-Chair Transfer      Bed-Chair [place] (Transfers) contact guard;standby assist  -RF        Assistive Device (Bed-Chair Transfers) walker, front-wheeled  -               Row Name 11/09/22 1616                   Chair-Bed Transfer      Chair-Bed [place] (Transfers) contact guard;standby assist  -RF        Assistive Device (Chair-Bed Transfers) wheelchair;walker, front-wheeled  -               Row Name 11/09/22 1616                   Sit-Stand Transfer      Sit-Stand [place] (Transfers) nonverbal cues (demo/gesture);verbal cues;contact guard;standby assist  -RF        Assistive Device (Sit-Stand Transfers) walker, front-wheeled  -RF               Row Name 11/09/22 1616                   Stand-Sit Transfer      Stand-Sit [place] (Transfers) contact guard;verbal cues;nonverbal cues (demo/gesture);standby assist  -RF         Assistive Device (Stand-Sit Transfers) wheelchair;walker, front-wheeled  -RF               Row Name 11/09/22 1616                   Gait/Stairs (Locomotion)      [place] Level (Gait) contact guard;nonverbal cues (demo/gesture);verbal cues  -RF        Assistive Device (Gait) walker, front-wheeled  -RF        Distance in Feet (Gait) 80X2 in AM, 20 in PM  -RF        Right Sided Gait Deviations heel strike decreased;weight shift ability decreased  Decreased R knee extension  -RF        Comment, (Gait/Stairs) Fair ambulation quality noted; cues provided for increased HS on RLE.  -RF               Row Name 11/09/22 1616                   Hip (Therapeutic Exercise)      Hip Strengthening (Therapeutic Exercise) bilateral;flexion;extension;aBduction;aDduction;heel slides;marching while seated;sitting;2 lb free weight;resistance band;green;2 sets;10 repetitions  #2 on LLE; BID  -RF               Row Name 11/09/22 1616                   Knee (Therapeutic Exercise)      Knee AAROM (Therapeutic Exercise) right;extension;sitting;2 sets;10 repetitions;other (see comments)  supine hang with heel prop; OP applied  -RF        Knee PROM (Therapeutic Exercise) right;flexion;extension;sitting;10 second hold;10 repetitions;other (see comments)  PROM knee extension; SB knee flex with OP  -RF        Knee Strengthening (Therapeutic Exercise) bilateral;flexion;extension;heel slides;marching while seated;LAQ (long arc quad);hamstring curls;sitting;2 lb free weight;resistance band;green;2 sets;10 repetitions  #2 on LLE; BID  -RF               Row Name 11/09/22 1616                   Modality Intervention (Comprehensive)      Modality Treatment cryotherapy  -RF               Row Name 11/09/22 1616                   Positioning and Restraints      Pre-Treatment Position in bed  AM, chair in PM  -RF        In Bed supine;call light within reach;encouraged to call for assist  PM  -RF        In Chair sitting;with OT  AM  -RF               Row  Name 11/09/22 1616                   Vital Signs      Intra Systolic BP Rehab 113  after ambulation in AM  -RF        Intra Treatment Diastolic BP 63  -RF               Row Name 11/09/22 1616                   Therapy Assessment/Plan (PT)      Rehab Potential/Prognosis (PT) good, to achieve stated therapy goals  -RF        Frequency of Treatment (PT) 5 times per week;90 minutes per session  -RF        Estimated Duration of Therapy (PT) 2 weeks;3 weeks  -RF               Row Name 11/09/22 1616                   Daily Progress Summary (PT)      Functional Goal Overall Progress (PT) progressing toward functional goals as expected  -RF        Daily Progress Summary (PT) Increased ambulation distance noted this date with use of RW. Increased R knee PRom observed as well. LBP continues to hinder activity this date. Continued skilled care required for further improvements to ensure maximum safe function upon D/C.  -RF        Impairments Still Limiting Function (PT) flexibility decreased;functional activity tolerance impairment;pain;range of motion deficit;strength deficit  -RF        Recommendations (PT) Continue per current POC  -RF               Row Name 11/09/22 1616                   Therapy Plan Review/Discharge Plan (PT)      Anticipated Discharge Disposition (PT) home with assist;home with supervision;home with home health  -RF               Row Name 11/09/22 1616                   IRF PT Goals      Bed Mobility Goal Selection (PT-IRF) bed mobility, PT goal 1  -RF        Gait (Walking Locomotion) Goal Selection (PT-IRF) gait, PT goal 1  -RF               Row Name 11/09/22 1616                   Bed Mobility Goal 1 (PT-IRF)      Activity/Assistive Device (Bed Mobility Goal 1, PT-IRF) bed mobility activities, all  -RF        [place] Level (Bed Mobility Goal 1, PT-IRF) independent  -RF        Time Frame (Bed Mobility Goal 1, PT-IRF) long-term goal (LTG)  -RF        Progress/Outcomes (Bed Mobility Goal 1, PT-IRF)  goal met  -RF               Row Name 11/09/22 1616                   Gait/Walking Locomotion Goal 1 (PT-IRF)      Activity/Assistive Device (Gait/Walking Locomotion Goal 1, PT-IRF) gait (walking locomotion);assistive device use  -RF        Gait/Walking Locomotion Distance Goal 1 (PT-IRF) 30'  -RF        [place] Level (Gait/Walking Locomotion Goal 1, PT-IRF) standby assist;modified independence  -RF        Time Frame (Gait/Walking Locomotion Goal 1, PT-IRF) long-term goal (LTG)  -RF        Progress/Outcomes (Gait/Walking Locomotion Goal 1, PT-IRF) goal ongoing  -RF                        User Key  (r) = Recorded By, (t) = Taken By, (c) = Cosigned By             Initials Name Provider Type      RF Meron Lei, PTA Physical Therapist Assistant                          Wound 09/21/22 1532 Right anterior knee Incision (Active)   Dressing Appearance open to air 11/09/22 0829   Closure Approximated 11/09/22 0829   Base dry;clean 11/09/22 0829   Drainage Amount none 11/08/22 1904   Care, Wound cleansed with 11/09/22 0829   Dressing Care open to air 11/09/22 0829       Wound 10/28/22 1827 Right coccyx Pressure Injury (Active)   Dressing Appearance open to air 11/09/22 0829   Closure Open to air 11/09/22 0829   Base red;non-blanchable 11/09/22 0829   Drainage Amount none 11/09/22 0829   Care, Wound barrier applied;pressure removed 11/09/22 0829   Dressing Care open to air 11/09/22 0829           PT Recommendation and Plan     Frequency of Treatment (PT): 5 times per week, 90 minutes per session  Daily Progress Summary (PT)  Functional Goal Overall Progress (PT): progressing toward functional goals as expected  Daily Progress Summary (PT): Increased ambulation distance noted this date with use of RW. Increased R knee PRom observed as well. LBP continues to hinder activity this date. Continued skilled care required for further improvements to ensure maximum safe function upon D/C.  Impairments Still Limiting Function  (PT): flexibility decreased, functional activity tolerance impairment, pain, range of motion deficit, strength deficit  Recommendations (PT): Continue per current POC                           Time Calculation:                PT Charges              Row Name 22 1624 22 1623                    Time Calculation      Start Time 1245  -RF 0915  -RF        Stop Time 1330  -RF 1000  -RF        Time Calculation (min) 45 min  -RF 45 min  -RF        PT Received On 22  -RF 22  -RF        PT - Next Appointment 11/10/22  -RF 22  -RF        PT Goal Re-Cert Due Date 22  -RF 22  -RF                 Time Calculation- PT      Total Timed Code Minutes- PT 45 minute(s)  -RF 45 minute(s)  -RF                      Darcy Gramajo, OT   Occupational Therapist  Occupational Therapy  Therapy Treatment Note     Signed  Date of Service:  22  Creation Time:  22     Signed        Expand All Collapse All  Inpatient Rehabilitation - Occupational Therapy Treatment Note     YVONNE Eder     Patient Name: Melchor Hardwick III                  : 1965                      MRN: [id number]     Today's Date: 2022                                                                             Admit Date: 10/28/2022        Visit Diagnosis       ICD-10-CM ICD-9-CM   1. Staphylococcal arthritis of right knee (HCC)  M00.061 711.06       041.10                  IRF OT ASSESSMENT FLOWSHEET (last 12 hours)                IRF OT Evaluation and Treatment             Row Name 22 1509 22 1149              OT Time and Intention      Document Type daily treatment  -KP daily treatment  -HB      Mode of Treatment individual therapy;occupational therapy  -KP individual therapy;occupational therapy  -HB      Total Minutes, Occupational Therapy 45  -KP 45  -HB      Patient Effort good  -KP good  -HB      Symptoms Noted During/After Treatment none  -KP none  -HB             Row Name 22  1509 11/07/22 1149              General Information      Patient Profile Reviewed yes  -KP yes  -HB      Patient/Family/Caregiver Comments/Observations -- Patient and RN okd OT this date.  -HB      General Observations of Patient Patient agreeable with no complaints voiced.  -KP Patient tolerated OT well with no adverse reactions.  -HB      Existing Precautions/Restrictions fall;brace worn when out of bed  -KP fall;brace worn when out of bed  LLE  -HB      Limitations/Impairments other (see comments)  orthostatic hypotension  -KP --             Row Name 11/07/22 1509 11/07/22 1149              Pain Assessment      Pretreatment Pain Rating 2/10  -KP 3/10  -HB      Posttreatment Pain Rating 1/10  -KP 3/10  -HB      Pain Location - back  -KP back  -HB             Row Name 11/07/22 1509 11/07/22 1149              Cognition/Psychosocial      Affect/Mental Status (Cognition) WFL  -KP WFL  -HB      Orientation Status (Cognition) oriented x 4  -KP oriented x 4  -HB      Follows Commands (Cognition) WFL  -KP WFL  -HB             Row Name 11/07/22 1149                   Upper Body Dressing      [place] Level (Upper Body Dressing) upper body dressing skills;pull over garment;set up assistance  -HB        Position (Upper Body Dressing) supine  -HB               Row Name 11/07/22 1149                   Grooming      [place] Level (Grooming) grooming skills;set up  -HB        Position (Grooming) supine  -HB               Row Name 11/07/22 1509 11/07/22 1149              Bed Mobility      Supine-Sit [place] (Bed Mobility) -- modified independence  -HB      Sit-Supine [place] (Bed Mobility) minimum assist (75% patient effort)  - --             Row Name 11/07/22 1149                   Bed-Chair Transfer      Bed-Chair [place] (Transfers) minimum assist (75% patient effort);nonverbal cues (demo/gesture);verbal cues;contact guard  -        Assistive Device (Bed-Chair Transfers) walker,  front-wheeled;wheelchair  -HB               Row Name 11/07/22 1509                   Chair-Bed Transfer      Chair-Bed [place] (Transfers) minimum assist (75% patient effort);contact guard  -        Assistive Device (Chair-Bed Transfers) wheelchair;walker, front-wheeled  -KP               Row Name 11/07/22 1149                   Sit-Stand Transfer      Sit-Stand [place] (Transfers) minimum assist (75% patient effort);nonverbal cues (demo/gesture);verbal cues  -        Assistive Device (Sit-Stand Transfers) walker, front-wheeled  -HB               Row Name 11/07/22 1149                   Stand-Sit Transfer      Stand-Sit [place] (Transfers) minimum assist (75% patient effort);nonverbal cues (demo/gesture);verbal cues  -        Assistive Device (Stand-Sit Transfers) wheelchair;walker, front-wheeled  -               Row Name 11/07/22 1509 11/07/22 1149              Motor Skills      Motor Skills -- coordination;functional endurance;motor control/coordination interventions  -      Functional Endurance fair plus  -KP --      Motor Control/Coordination Interventions therapeutic exercise/ROM;fine motor manipulation/dexterity activities  functional reach activity sitting with BUE to [place] [facility] [place]  - fine motor manipulation/dexterity activities;therapeutic exercise/ROM;gross motor coordination activities  -      Therapeutic Exercise -- shoulder;elbow/forearm;wrist;hand  -      Additional Documentation -- --  rickshaw 10lbs 4 sets of 20 reps; PRE 3x 3min; wrist rolls; rest between tasks  -             Row Name 11/07/22 1509 11/07/22 1149              Positioning and Restraints      Pre-Treatment Position sitting in chair/recliner  - in bed  -      Post Treatment Position bed  - wheelchair  -      In Bed call light within reach;supine;fowlers  - with PT  -             Row Name 11/07/22 1149                   Transfer Goal 1 (OT-IRF)      Activity/Assistive Device (Transfer  Goal 1, OT-IRF) toilet  -HB        [place] Level (Transfer Goal 1, OT-IRF) contact guard required  -HB        Time Frame (Transfer Goal 1, OT-IRF) short-term goal (STG)  -HB        Progress/Outcomes (Transfer Goal 1, OT-IRF) good progress toward goal  -HB               Row Name 11/07/22 1149                   Transfer Goal 2 (OT-IRF)      Activity/Assistive Device (Transfer Goal 2, OT-IRF) toilet  -HB        [place] Level (Transfer Goal 2, OT-IRF) supervision required  -HB        Time Frame (Transfer Goal 2, OT-IRF) long-term goal (LTG)  -HB        Progress/Outcomes (Transfer Goal 2, OT-IRF) good progress toward goal  -HB               Row Name 11/07/22 1149                   LB Dressing Goal 1 (OT-IRF)      Activity/Device (LB Dressing Goal 1, OT-IRF) lower body dressing  -HB        [place] (LB Dressing Goal 1, OT-IRF) minimum assist (75% or more patient effort)  -HB        Time Frame (LB Dressing Goal 1, OT-IRF) short-term goal (STG)  -HB        Progress/Outcomes (LB Dressing Goal 1, OT-IRF) good progress toward goal  -HB               Row Name 11/07/22 1149                   LB Dressing Goal 2 (OT-IRF)      Activity/Device (LB Dressing Goal 2, OT-IRF) lower body dressing  -HB        [place] (LB Dressing Goal 2, OT-IRF) set-up required  -HB        Time Frame (LB Dressing Goal 2, OT-IRF) long-term goal (LTG)  -HB        Progress/Outcomes (LB Dressing Goal 2, OT-IRF) good progress toward goal  -HB               Row Name 11/07/22 1149                   Toileting Goal 1 (OT-IRF)      Activity/Device (Toileting Goal 1, OT-IRF) toileting skills, all  -HB        [place] Level (Toileting Goal 1, OT-IRF) minimum assist (75% or more patient effort)  -HB        Progress/Outcomes (Toileting Goal 1, OT-IRF) good progress toward goal  -HB               Row Name 11/07/22 1149                   Toileting Goal 2 (OT-IRF)      Activity/Device (Toileting Goal 2, OT-IRF) toileting skills, all  -HB         [place] Level (Toileting Goal 2, OT-IRF) set-up required  -HB        Progress/Outcomes (Toileting Goal 2, OT-IRF) good progress toward goal  -HB                        User Key  (r) = Recorded By, (t) = Taken By, (c) = Cosigned By             Initials Name Effective Dates      Darcy Tineo, OT 21 -        Dorothy Rosas, OT 21 -                              OT Recommendation and Plan        Daily Progress Summary (OT)  Overall Progress Toward Functional Goals (OT): progressing toward functional goals as expected                Time Calculation:                Time Calculation- OT              Row Name 22 1515 22 1156                    Time Calculation- OT      OT Start Time 1415  -KP 1000  -HB        OT Stop Time 1500  -KP 1045  -HB        OT Time Calculation (min) 45 min  -KP 45 min  -HB        Total Timed Code Minutes- OT 45 minute(s)  -KP 45 minute(s)  -HB                        User Key  (r) = Recorded By, (t) = Taken By, (c) = Cosigned By             Initials Name Provider Type      Darcy Tineo, PAYAM Occupational Therapist       Dorothy Rosas OT Occupational Therapist                            Therapy Charges for Today      Code Description Service Date Service Provider Modifiers Qty     [id number]  OT THERAPEUTIC ACT EA 15 MIN 2022 Darcy Gramajo OT GO 1     [id number] HC OT THER PROC EA 15 MIN 2022 Darcy Gramajo OT GO 2                      Darcy Gramajo OT                   2022              Dorothy Rosas OT   Occupational Therapist  Occupational Therapy  Therapy Treatment Note     Signed  Date of Service:  22  Creation Time:  22     Signed        Expand All Collapse All  Inpatient Rehabilitation - Occupational Therapy Treatment Note     YVONNE Gaines     Patient Name: Melchor Hardwick III                  : 1965                      MRN: [id number]     Today's Date: 2022                                                                              Admit Date: 10/28/2022        Visit Diagnosis       ICD-10-CM ICD-9-CM   1. Staphylococcal arthritis of right knee (HCC)  M00.061 711.06       041.10                              IRF OT ASSESSMENT FLOWSHEET (last 12 hours)                IRF OT Evaluation and Treatment             Row Name 11/08/22 1420                   OT Time and Intention      Document Type daily treatment  -HB        Mode of Treatment individual therapy;occupational therapy  -HB        Total Minutes, Occupational Therapy 90  -HB        Patient Effort good  -HB               Row Name 11/08/22 1420                   General Information      Patient Profile Reviewed yes  -HB        Patient/Family/Caregiver Comments/Observations Patient and RN okd OT this date.  -HB        General Observations of Patient Patient tolerated OT well with no adverse reactions.  -HB        Existing Precautions/Restrictions fall;brace worn when out of bed  -HB               Row Name 11/08/22 1420                   Pain Assessment      Pretreatment Pain Rating 4/10  -HB        Posttreatment Pain Rating 4/10  -HB        Pain Location - back  -               Row Name 11/08/22 1420                   Cognition/Psychosocial      Affect/Mental Status (Cognition) WFL  -HB        Orientation Status (Cognition) oriented x 4  -HB        Follows Commands (Cognition) WFL  -HB               Row Name 11/08/22 1420                   Grooming      [place] Level (Grooming) grooming skills;set up  -        Position (Grooming) supported standing  -               Row Name 11/08/22 1420                   Motor Skills      Motor Skills coordination;functional endurance;motor control/coordination interventions  -        Motor Control/Coordination Interventions fine motor manipulation/dexterity activities;therapeutic exercise/ROM;gross motor coordination activities  -        Therapeutic Exercise shoulder;elbow/forearm;wrist;hand  -         Additional Documentation --  PRE 3x 3 min; wrist rolls; BUE TA to increase ADL status/endurance; rest between tasks  -               Row Name 11/08/22 1420                   Positioning and Restraints      Pre-Treatment Position sitting in chair/recliner  -        Post Treatment Position wheelchair  -        In Bed encouraged to call for assist;call light within reach  -                        User Key  (r) = Recorded By, (t) = Taken By, (c) = Cosigned By             Initials Name Effective Dates       Dorothy Rosas OT 05/25/21 -                              OT Recommendation and Plan     Planned Therapy Interventions (OT): activity tolerance training, adaptive equipment training, BADL retraining, IADL retraining, patient/caregiver education/training, strengthening exercise, transfer/mobility retraining                          Time Calculation:                Time Calculation- OT              Row Name 11/08/22 1426                         Time Calculation- OT      OT Start Time 1000  -          OT Stop Time 1130  -          OT Time Calculation (min) 90 min  -          Total Timed Code Minutes- OT 90 minute(s)  -                             Dorothy Rosas OT   Occupational Therapist  Occupational Therapy  Therapy Treatment Note     Signed  Date of Service:  11/09/22 1232  Creation Time:  11/09/22 1232     Signed

## 2022-11-10 NOTE — THERAPY TREATMENT NOTE
Inpatient Rehabilitation - Physical Therapy Treatment Note       YVONNE Gaines     Patient Name: Melchor Hardwick III  : 1965  MRN: [id number]    Today's Date: 11/10/2022                    Admit Date: 10/28/2022      Visit Dx:     ICD-10-CM ICD-9-CM   1. Staphylococcal arthritis of right knee (HCC)  M00.061 711.06     041.10       Patient Active Problem List   Diagnosis   • Hyperlipidemia   • Hypertensive disorder   • Obesity   • Type 2 diabetes mellitus with hyperglycemia, with long-term current use of insulin (HCC)   • Gas gangrene of foot (HCC)   • Cellulitis in diabetic foot (HCC)   • Other acute osteomyelitis of left foot (HCC)   • Osteomyelitis (HCC)   • Critical illness myopathy   • Staphylococcal arthritis of right knee (HCC)   • Other cirrhosis of liver (HCC)   • MRSA bacteremia   • Endocarditis of tricuspid valve   • Wound of right buttock   • History of osteomyelitis   • Debility       Past Medical History:   Diagnosis Date   • Diabetes mellitus (HCC)    • Hyperlipidemia    • Hypertension    • Pyogenic arthritis of right knee joint, due to unspecified organism (HCC) 2022       Past Surgical History:   Procedure Laterality Date   • ABSCESS DRAINAGE      PERINEUM   • AMPUTATION FOOT Left 2022    Procedure: AMPUTATION FOOT;  Surgeon: Addison Colby MD;  Location: [facility] OR;  Service: Podiatry;  Laterality: Left;   • INCISION AND DRAINAGE LEG Left 05/10/2022    Procedure: INCISION AND DRAINAGE LOWER EXTREMITY;  Surgeon: Addison Colby MD;  Location:  [facility] OR;  Service: Podiatry;  Laterality: Left;   • KNEE INCISION AND DRAINAGE Right 2022    Procedure: KNEE INCISION AND DRAINAGE RIGHT;  Surgeon: Brain Bentley MD;  Location: [facility] OR;  Service: Orthopedics;  Laterality: Right;   • WOUND DEBRIDEMENT Left 2022    Procedure: DEBRIDEMENT FOOT;  Surgeon: Addison Colby MD;  Location: [facility] OR;  Service: Podiatry;  Laterality: Left;       PT ASSESSMENT (last 12 hours)     IRF PT  Evaluation and Treatment     Row Name 11/10/22 1543          PT Time and Intention    Document Type daily treatment  -RF     Mode of Treatment physical therapy  -RF     Patient/Family/Caregiver Comments/Observations Pt c/o R knee soreness and low back pain BID.  -RF     Row Name 11/10/22 1543          General Information    Patient Profile Reviewed yes  -RF     Existing Precautions/Restrictions fall;brace worn when out of bed  aircast L LE  -RF     Limitations/Impairments other (see comments)  ortho hypo  -RF     Row Name 11/10/22 1543          Pain Assessment    Pretreatment Pain Rating 6/10  knee, low back  -RF     Posttreatment Pain Rating 8/10  -RF     Row Name 11/10/22 1543          Cognition/Psychosocial    Affect/Mental Status (Cognition) WFL  -RF     Orientation Status (Cognition) oriented x 4  -RF     Follows Commands (Cognition) WFL  -RF     Personal Safety Interventions gait belt;nonskid shoes/slippers when out of bed;fall prevention program maintained  -RF     Row Name 11/10/22 1543          Mobility    Extremity Weight-bearing Status left lower extremity;right lower extremity  -RF     Left Lower Extremity (Weight-bearing Status) weight-bearing as tolerated (WBAT);other (see comments)  with brace/air cast  -RF     Right Lower Extremity (Weight-bearing Status) weight-bearing as tolerated (WBAT);other (see comments)  -RF     Row Name 11/10/22 1543          Bed Mobility    Bed Mobility supine-sit;sit-supine;scooting/bridging;rolling left;rolling right  -RF     Rolling Left [place] (Bed Mobility) modified independence  -RF     Rolling Right [place] (Bed Mobility) modified independence  -RF     Scooting/Bridging [place] (Bed Mobility) modified independence  -RF     Supine-Sit [place] (Bed Mobility) standby assist  -RF     Sit-Supine [place] (Bed Mobility) standby assist;supervision  -RF     Assistive Device (Bed Mobility) bed rails  -RF     Row Name 11/10/22 1543          Transfer  Assessment/Treatment    Transfers car transfer  -RF     Row Name 11/10/22 1543          Bed-Chair Transfer    Bed-Chair [place] (Transfers) contact guard;standby assist  -RF     Assistive Device (Bed-Chair Transfers) walker, front-wheeled  -RF     Row Name 11/10/22 1543          Chair-Bed Transfer    Chair-Bed [place] (Transfers) contact guard;standby assist  -RF     Assistive Device (Chair-Bed Transfers) wheelchair;walker, front-wheeled  -RF     Row Name 11/10/22 1543          Sit-Stand Transfer    Sit-Stand [place] (Transfers) nonverbal cues (demo/gesture);verbal cues;contact guard;standby assist  -RF     Assistive Device (Sit-Stand Transfers) walker, front-wheeled  -RF     Row Name 11/10/22 1543          Stand-Sit Transfer    Stand-Sit [place] (Transfers) contact guard;verbal cues;nonverbal cues (demo/gesture);standby assist  -RF     Assistive Device (Stand-Sit Transfers) wheelchair;walker, front-wheeled  -RF     Row Name 11/10/22 1543          Car Transfer    Type (Car Transfer) stand pivot/stand step  -RF     [place] Level (Car Transfer) contact guard;minimum assist (75% patient effort)  -RF     Assistive Device (Car Transfer) wheelchair  -RF     Row Name 11/10/22 1543          Gait/Stairs (Locomotion)    [place] Level (Gait) contact guard;nonverbal cues (demo/gesture);verbal cues  -RF     Assistive Device (Gait) walker, front-wheeled  -RF     Distance in Feet (Gait) 20  -RF     Right Sided Gait Deviations heel strike decreased;weight shift ability decreased  Decreased R knee extension  -RF     Comment, (Gait/Stairs) Decreased HS and knee extension noted. Ambulation distance hindered due to R knee pain this date.  -RF     Row Name 11/10/22 1543          Hip (Therapeutic Exercise)    Hip Strengthening (Therapeutic Exercise) bilateral;flexion;extension;aBduction;aDduction;heel slides;marching while seated;sitting;resistance band;green;2 sets;10 repetitions;marching while  standing;mini squats  Standing TE performed on RLE only; pt unable to perform unilateral stance on L  -RF     Row Name 11/10/22 1543          Knee (Therapeutic Exercise)    Knee Strengthening (Therapeutic Exercise) bilateral;flexion;extension;heel slides;marching while seated;LAQ (long arc quad);hamstring curls;sitting;resistance band;green;2 sets;10 repetitions  Standing TE performed on RLE only; pt unable to perform unilateral stance on L  -RF     Row Name 11/10/22 1543          Ankle (Therapeutic Exercise)    Ankle (Therapeutic Exercise) PROM (passive range of motion)  -RF     Ankle PROM (Therapeutic Exercise) left;dorsiflexion;plantarflexion;sitting;5 second hold;10 repetitions  -RF     Ankle Strengthening (Therapeutic Exercise) bilateral;dorsiflexion;plantarflexion;sitting;2 sets;10 repetitions  -     Row Name 11/10/22 1543          Aerobic Exercise    Type (Aerobic Exercise) recumbent stationary bike  -RF     Time Performed (Aerobic Exercise) 8 mins  -RF     Comment, Aerobic Exercise (Therapeutic Exercise) Unable to complete full revolutions  -     Row Name 11/10/22 1543          Modality Intervention (Comprehensive)    Modality Treatment cryotherapy  -     Row Name 11/10/22 1543          Therapy Assessment/Plan (PT)    Rehab Potential/Prognosis (PT) good, to achieve stated therapy goals  -     Frequency of Treatment (PT) 5 times per week;90 minutes per session  -     Estimated Duration of Therapy (PT) 2 weeks;3 weeks  -     Row Name 11/10/22 1543          Daily Progress Summary (PT)    Functional Goal Overall Progress (PT) progressing toward functional goals as expected  -     Daily Progress Summary (PT) Pt hindered due to RLE pain and low back pain this date. Good functional mobility noted. Fair ambulation quality observed, however, pt relies heavily on UEs. Decreased R knee ROM persists. Continued skilled care required for further improvements to ensure maximum safe function upon D/C.  -      Impairments Still Limiting Function (PT) flexibility decreased;functional activity tolerance impairment;pain;range of motion deficit;strength deficit  -RF     Recommendations (PT) Continue per current POC  -RF     Row Name 11/10/22 1543          Therapy Plan Review/Discharge Plan (PT)    Anticipated Discharge Disposition (PT) home with assist;home with supervision;home with home health  -RF     Row Name 11/10/22 1543          IRF PT Goals    Bed Mobility Goal Selection (PT-IRF) bed mobility, PT goal 1  -RF     Gait (Walking Locomotion) Goal Selection (PT-IRF) gait, PT goal 1  -RF     Row Name 11/10/22 1543          Bed Mobility Goal 1 (PT-IRF)    Activity/Assistive Device (Bed Mobility Goal 1, PT-IRF) bed mobility activities, all  -RF     [place] Level (Bed Mobility Goal 1, PT-IRF) independent  -RF     Time Frame (Bed Mobility Goal 1, PT-IRF) long-term goal (LTG)  -RF     Progress/Outcomes (Bed Mobility Goal 1, PT-IRF) goal met  -RF     Row Name 11/10/22 1543          Gait/Walking Locomotion Goal 1 (PT-IRF)    Activity/Assistive Device (Gait/Walking Locomotion Goal 1, PT-IRF) gait (walking locomotion);assistive device use  -RF     Gait/Walking Locomotion Distance Goal 1 (PT-IRF) 30'  -RF     [place] Level (Gait/Walking Locomotion Goal 1, PT-IRF) standby assist;modified independence  -RF     Time Frame (Gait/Walking Locomotion Goal 1, PT-IRF) long-term goal (LTG)  -RF     Progress/Outcomes (Gait/Walking Locomotion Goal 1, PT-IRF) goal ongoing  -RF           User Key  (r) = Recorded By, (t) = Taken By, (c) = Cosigned By    Initials Name Provider Type    RF Meron Lei PTA Physical Therapist Assistant              Wound 09/21/22 1532 Right anterior knee Incision (Active)   Dressing Appearance open to air 11/10/22 0906   Closure Approximated 11/10/22 0906   Base dry;clean 11/10/22 0906   Care, Wound cleansed with 11/10/22 0906   Dressing Care open to air 11/10/22 0906       Wound 10/28/22 1827 Right  coccyx Pressure Injury (Active)   Dressing Appearance open to air 11/10/22 0906   Closure Open to air 11/10/22 0906   Base red;non-blanchable 11/10/22 0906   Drainage Amount none 11/10/22 0906   Care, Wound barrier applied;pressure removed 11/10/22 0906   Dressing Care open to air 11/10/22 0906     Physical Therapy Education     Title: PT OT SLP Therapies (Done)     Topic: Physical Therapy (Done)     Point: Mobility training (Done)     Learning Progress Summary           Patient Acceptance, E,TB, VU by RF at 11/10/2022 1551    Acceptance, E,TB, VU by RF at 11/9/2022 1622    Acceptance, E,TB, VU by DG at 11/9/2022 0050    Acceptance, E,TB, VU by RF at 11/8/2022 1539    Acceptance, E,TB, VU by DG at 11/8/2022 0114    Acceptance, E,TB, VU by RF at 11/7/2022 1607    Acceptance, E,TB, VU by DG at 11/6/2022 2023    Acceptance, E,TB, VU by RF at 11/4/2022 1551    Acceptance, E,TB, VU,DU by HR at 11/3/2022 1541    Acceptance, E,D, VU,NR by RG at 11/3/2022 1442    Acceptance, E,TB, VU by DG at 11/1/2022 2016    Acceptance, E,D, VU,NR by RG at 11/1/2022 1541    Acceptance, E,TB, VU by DG at 10/31/2022 2315    Acceptance, E,D, VU,NR by RG at 10/31/2022 1446                   Point: Home exercise program (Done)     Learning Progress Summary           Patient Acceptance, E,TB, VU by RF at 11/10/2022 1551    Acceptance, E,TB, VU by RF at 11/9/2022 1622    Acceptance, E,TB, VU by DG at 11/9/2022 0050    Acceptance, E,TB, VU by RF at 11/8/2022 1539    Acceptance, E,TB, VU by DG at 11/8/2022 0114    Acceptance, E,TB, VU by RF at 11/7/2022 1607    Acceptance, E,TB, VU by DG at 11/6/2022 2023    Acceptance, E,TB, VU by RF at 11/4/2022 1551    Acceptance, E,TB, VU,DU by HR at 11/3/2022 1541    Acceptance, E,D, VU,NR by RG at 11/3/2022 1442    Acceptance, E,TB, VU by DG at 11/1/2022 2016    Acceptance, E,D, VU,NR by RG at 11/1/2022 1541    Acceptance, E,TB, VU by DG at 10/31/2022 2315    Acceptance, E,D, VU,NR by RG at 10/31/2022 1446                    Point: Body mechanics (Done)     Learning Progress Summary           Patient Acceptance, E,TB, VU by RF at 11/10/2022 1551    Acceptance, E,TB, VU by RF at 11/9/2022 1622    Acceptance, E,TB, VU by DG at 11/9/2022 0050    Acceptance, E,TB, VU by RF at 11/8/2022 1539    Acceptance, E,TB, VU by DG at 11/8/2022 0114    Acceptance, E,TB, VU by RF at 11/7/2022 1607    Acceptance, E,TB, VU by DG at 11/6/2022 2023    Acceptance, E,TB, VU by RF at 11/4/2022 1551    Acceptance, E,TB, VU,DU by HR at 11/3/2022 1541    Acceptance, E,D, VU,NR by RG at 11/3/2022 1442    Acceptance, E,TB, VU by DG at 11/1/2022 2016    Acceptance, E,D, VU,NR by RG at 11/1/2022 1541    Acceptance, E,TB, VU by DG at 10/31/2022 2315    Acceptance, E,D, VU,NR by RG at 10/31/2022 1446                   Point: Precautions (Done)     Learning Progress Summary           Patient Acceptance, E,TB, VU by RF at 11/10/2022 1551    Acceptance, E,TB, VU by RF at 11/9/2022 1622    Acceptance, E,TB, VU by DG at 11/9/2022 0050    Acceptance, E,TB, VU by RF at 11/8/2022 1539    Acceptance, E,TB, VU by DG at 11/8/2022 0114    Acceptance, E,TB, VU by RF at 11/7/2022 1607    Acceptance, E,TB, VU by DG at 11/6/2022 2023    Acceptance, E,TB, VU by RF at 11/4/2022 1551    Acceptance, E,TB, VU,DU by HR at 11/3/2022 1541    Acceptance, E,D, VU,NR by RG at 11/3/2022 1442    Acceptance, E,TB, VU by DG at 11/1/2022 2016    Acceptance, E,D, VU,NR by RG at 11/1/2022 1541    Acceptance, E,TB, VU by  at 10/31/2022 2315    Acceptance, E,D, VU,NR by RG at 10/31/2022 1446                               User Key     Initials Effective Dates Name Provider Type Discipline     06/16/21 -  Phyllis Yañez, RN Registered Nurse Nurse     06/16/21 -  Meron Lei PTA Physical Therapist Assistant PT    RG 06/16/21 -  Michi Mckeon PTA Physical Therapist Assistant PT    HR 01/14/22 -  Hanna Murphy PTA Physical Therapist Assistant PT                PT  Recommendation and Plan    Frequency of Treatment (PT): 5 times per week, 90 minutes per session     Daily Progress Summary (PT)  Functional Goal Overall Progress (PT): progressing toward functional goals as expected  Daily Progress Summary (PT): Pt hindered due to RLE pain and low back pain this date. Good functional mobility noted. Fair ambulation quality observed, however, pt relies heavily on UEs. Decreased R knee ROM persists. Continued skilled care required for further improvements to ensure maximum safe function upon D/C.  Impairments Still Limiting Function (PT): flexibility decreased, functional activity tolerance impairment, pain, range of motion deficit, strength deficit  Recommendations (PT): Continue per current POC               Time Calculation:      PT Charges     Row Name 11/10/22 1552 11/10/22 1551          Time Calculation    Start Time 1245  -RF 1045  -RF     Stop Time 1330  -RF 1130  -RF     Time Calculation (min) 45 min  -RF 45 min  -RF     PT Received On 11/10/22  -RF 11/10/22  -RF     PT - Next Appointment 11/11/22  -RF 11/10/22  -RF     PT Goal Re-Cert Due Date 11/11/22  -RF 11/11/22  -RF        Time Calculation- PT    Total Timed Code Minutes- PT 45 minute(s)  -RF 45 minute(s)  -RF           User Key  (r) = Recorded By, (t) = Taken By, (c) = Cosigned By    Initials Name Provider Type    RF Meron Lei PTA Physical Therapist Assistant                Therapy Charges for Today     Code Description Service Date Service Provider Modifiers Qty    65900419300 HC GAIT TRAINING EA 15 MIN 11/9/2022 Meron Lei PTA GP, CQ 2    75649184862 HC PT NEUROMUSC RE EDUCATION EA 15 MIN 11/9/2022 Meron Lei PTA GP, CQ 1    68145575002 HC PT THER PROC EA 15 MIN 11/9/2022 Meron Lei PTA GP, CQ 2    19668117198 HC PT THERAPEUTIC ACT EA 15 MIN 11/9/2022 Meron Lei PTA GP, CQ 1    [id number] HC GAIT TRAINING EA 15 MIN 11/10/2022 Meron Lei PTA GP, CQ 1    [id number]   PT THER PROC EA 15 MIN 11/10/2022 Meron Lei, LIZETT GP, CQ 4    [id number]  PT THERAPEUTIC ACT EA 15 MIN 11/10/2022 Meron Lei, LIZETT GP, CQ 1                   Meron Lei, LIZETT  11/10/2022

## 2022-11-10 NOTE — THERAPY TREATMENT NOTE
Inpatient Rehabilitation - Occupational Therapy Treatment Note    YVONNE Gaines     Patient Name: Melchor Hardwick III  : 1965  MRN: [id number]    Today's Date: 11/10/2022                 Admit Date: 10/28/2022         ICD-10-CM ICD-9-CM   1. Staphylococcal arthritis of right knee (HCC)  M00.061 711.06     041.10       Patient Active Problem List   Diagnosis   • Hyperlipidemia   • Hypertensive disorder   • Obesity   • Type 2 diabetes mellitus with hyperglycemia, with long-term current use of insulin (HCC)   • Gas gangrene of foot (HCC)   • Cellulitis in diabetic foot (HCC)   • Other acute osteomyelitis of left foot (HCC)   • Osteomyelitis (HCC)   • Critical illness myopathy   • Staphylococcal arthritis of right knee (HCC)   • Other cirrhosis of liver (HCC)   • MRSA bacteremia   • Endocarditis of tricuspid valve   • Wound of right buttock   • History of osteomyelitis   • Debility       Past Medical History:   Diagnosis Date   • Diabetes mellitus (HCC)    • Hyperlipidemia    • Hypertension    • Pyogenic arthritis of right knee joint, due to unspecified organism (HCC) 2022       Past Surgical History:   Procedure Laterality Date   • ABSCESS DRAINAGE      PERINEUM   • AMPUTATION FOOT Left 2022    Procedure: AMPUTATION FOOT;  Surgeon: Addison Colby MD;  Location: [facility] OR;  Service: Podiatry;  Laterality: Left;   • INCISION AND DRAINAGE LEG Left 05/10/2022    Procedure: INCISION AND DRAINAGE LOWER EXTREMITY;  Surgeon: Addison Colby MD;  Location: [facility] OR;  Service: Podiatry;  Laterality: Left;   • KNEE INCISION AND DRAINAGE Right 2022    Procedure: KNEE INCISION AND DRAINAGE RIGHT;  Surgeon: Brain Bentley MD;  Location: [facility] OR;  Service: Orthopedics;  Laterality: Right;   • WOUND DEBRIDEMENT Left 2022    Procedure: DEBRIDEMENT FOOT;  Surgeon: Addison Colby MD;  Location: [facility] OR;  Service: Podiatry;  Laterality: Left;             IRF OT ASSESSMENT FLOWSHEET (last 12 hours)      IRF OT Evaluation and Treatment     Row Name 11/10/22 1431          OT Time and Intention    Document Type daily treatment  -BF     Mode of Treatment occupational therapy  -BF     Patient Effort good  -BF     Symptoms Noted During/After Treatment none  -BF     Row Name 11/10/22 1431          General Information    Existing Precautions/Restrictions fall;brace worn when out of bed  aircast L LE  -BF     Row Name 11/10/22 1431          Cognition/Psychosocial    Orientation Status (Cognition) oriented x 4  -BF     Follows Commands (Cognition) WFL  -BF     Row Name 11/10/22 1431          Lower Body Dressing    [place] Level (Lower Body Dressing) moderate assist (50% patient effort);minimum assist (75% patient effort);verbal cues  -BF     Assistive Device Use (Lower Body Dressing) reacher  -BF     Position (Lower Body Dressing) supine  -     Row Name 11/10/22 1431          Grooming    [place] Level (Grooming) set up  -BF     Position (Grooming) supported sitting  -     Row Name 11/10/22 1431          Transfer Assessment/Treatment    Transfers toilet transfer  -     Row Name 11/10/22 1431          Bed-Chair Transfer    Bed-Chair [place] (Transfers) contact guard;verbal cues;nonverbal cues (demo/gesture)  -     Assistive Device (Bed-Chair Transfers) walker, front-wheeled;wheelchair  -     Row Name 11/10/22 1431          Chair-Bed Transfer    Chair-Bed [place] (Transfers) contact guard;verbal cues;nonverbal cues (demo/gesture)  -     Assistive Device (Chair-Bed Transfers) wheelchair;walker, front-wheeled  -     Row Name 11/10/22 1431          Toilet Transfer    [place] Level (Toilet Transfer) contact guard;verbal cues;nonverbal cues (demo/gesture)  -     Assistive Device (Toilet Transfer) wheelchair;grab bars/safety frame  -     Row Name 11/10/22 1431          Motor Skills    Motor Control/Coordination Interventions gross motor coordination activities;therapeutic exercise/ROM  BUE  C therex, strengthening; BUE PRE 3 mins X3, rickshaw 20 lbs X20X3, flexbar therex, wrist rolls  -BF     Row Name 11/10/22 1431          Positioning and Restraints    In Bed supine;call light within reach;encouraged to call for assist  In PM  -BF           User Key  (r) = Recorded By, (t) = Taken By, (c) = Cosigned By    Initials Name Effective Dates    BF Mandi Smith OT 06/16/21 -                  Occupational Therapy Education     Title: PT OT SLP Therapies (Done)     Topic: Occupational Therapy (Done)     Point: ADL training (Done)     Description:   Instruct learner(s) on proper safety adaptation and remediation techniques during self care or transfers.   Instruct in proper use of assistive devices.              Learning Progress Summary           Patient Acceptance, E, VU,NR by BF at 11/10/2022 1431    Acceptance, E, VU,NR by HB at 11/9/2022 1230    Acceptance, E,TB, VU by DG at 11/9/2022 0050    Acceptance, E, VU,NR by HB at 11/8/2022 1426    Acceptance, E,TB, VU by DG at 11/8/2022 0114    Acceptance, E, VU,NR by HB at 11/7/2022 1155    Acceptance, E,TB, VU by DG at 11/6/2022 2023    Acceptance, E, VU,NR by HB at 11/4/2022 1153    Acceptance, E, VU,NR by HB at 11/3/2022 1428    Acceptance, E,TB, VU by DG at 11/1/2022 2016    Acceptance, E,TB, VU by DG at 10/31/2022 2315    Acceptance, E, VU,NR by BF at 10/31/2022 1429    Acceptance, E,TB, VU by DL at 10/31/2022 0101    Acceptance, E,TB, VU by DL at 10/29/2022 2321    Acceptance, E, VU,NR by HB at 10/29/2022 1137                   Point: Home exercise program (Done)     Description:   Instruct learner(s) on appropriate technique for monitoring, assisting and/or progressing therapeutic exercises/activities.              Learning Progress Summary           Patient Acceptance, E, VU,NR by BF at 11/10/2022 1431    Acceptance, E, VU,NR by HB at 11/9/2022 1230    Acceptance, E,TB, VU by DG at 11/9/2022 0050    Acceptance, E, VU,NR by CLARITZA at 11/8/2022 1426     Acceptance, E,TB, VU by DG at 11/8/2022 0114    Acceptance, E, VU,NR by HB at 11/7/2022 1155    Acceptance, E,TB, VU by DG at 11/6/2022 2023    Acceptance, E, VU,NR by HB at 11/4/2022 1153    Acceptance, E, VU,NR by HB at 11/3/2022 1428    Acceptance, E,TB, VU by DG at 11/1/2022 2016    Acceptance, E,TB, VU by DG at 10/31/2022 2315    Acceptance, E,TB, VU by DL at 10/31/2022 0101    Acceptance, E,TB, VU by DL at 10/29/2022 2321    Acceptance, E, VU,NR by HB at 10/29/2022 1137                   Point: Precautions (Done)     Description:   Instruct learner(s) on prescribed precautions during self-care and functional transfers.              Learning Progress Summary           Patient Acceptance, E, VU,NR by HB at 11/9/2022 1230    Acceptance, E,TB, VU by DG at 11/9/2022 0050    Acceptance, E, VU,NR by HB at 11/8/2022 1426    Acceptance, E,TB, VU by DG at 11/8/2022 0114    Acceptance, E, VU,NR by HB at 11/7/2022 1155    Acceptance, E,TB, VU by DG at 11/6/2022 2023    Acceptance, E, VU,NR by HB at 11/4/2022 1153    Acceptance, E, VU,NR by HB at 11/3/2022 1428    Acceptance, E,TB, VU by DG at 11/1/2022 2016    Acceptance, E,TB, VU by DG at 10/31/2022 2315    Acceptance, E, VU,NR by BF at 10/31/2022 1429    Acceptance, E,TB, VU by DL at 10/31/2022 0101    Acceptance, E,TB, VU by DL at 10/29/2022 2321    Acceptance, E, VU,NR by HB at 10/29/2022 1137                   Point: Body mechanics (Done)     Description:   Instruct learner(s) on proper positioning and spine alignment during self-care, functional mobility activities and/or exercises.              Learning Progress Summary           Patient Acceptance, E, VU,NR by HB at 11/9/2022 1230    Acceptance, E,TB, VU by DG at 11/9/2022 0050    Acceptance, E, VU,NR by HB at 11/8/2022 1426    Acceptance, E,TB, VU by DG at 11/8/2022 0114    Acceptance, E, VU,NR by HB at 11/7/2022 1155    Acceptance, E,TB, VU by DG at 11/6/2022 2023    Acceptance, E, VU,NR by HB at 11/4/2022  1153    Acceptance, E, VU,NR by HB at 11/3/2022 1428    Acceptance, E,TB, VU by DG at 11/1/2022 2016    Acceptance, E,TB, VU by DG at 10/31/2022 2315    Acceptance, E,TB, VU by DL at 10/31/2022 0101    Acceptance, E,TB, VU by DL at 10/29/2022 2321    Acceptance, E, VU,NR by HB at 10/29/2022 1137                               User Key     Initials Effective Dates Name Provider Type Discipline    DG 06/16/21 -  Phylils Yañez, RN Registered Nurse Nurse    BF 06/16/21 -  Mandi Smith OT Occupational Therapist OT    HB 05/25/21 -  Dorothy Rosas OT Occupational Therapist OT    DL 03/16/22 -  Hellen Mcdonnell, RN Registered Nurse Nurse                    OT Recommendation and Plan                         Time Calculation:      Time Calculation- OT     Row Name 11/10/22 1438 11/10/22 0825          Time Calculation- OT    OT Start Time 1330  -BF 0825  -BF     OT Stop Time 1400  -BF 0925  -BF     OT Time Calculation (min) 30 min  -BF 60 min  -BF     Total Timed Code Minutes- OT 30 minute(s)  -BF 60 minute(s)  -BF     OT Non-Billable Time (min) -- 10 min  -BF           User Key  (r) = Recorded By, (t) = Taken By, (c) = Cosigned By    Initials Name Provider Type    BF Mandi Smith, OT Occupational Therapist              Therapy Charges for Today     Code Description Service Date Service Provider Modifiers Qty    77821901059 HC OT SELF CARE/MGMT/TRAIN EA 15 MIN 11/10/2022 Mandi Smith, OT GO 2    26747334447 HC OT THERAPEUTIC ACT EA 15 MIN 11/10/2022 Mandi Smith, OT GO 1    34066351589 HC OT THER PROC EA 15 MIN 11/10/2022 Mandi Smith, OT GO 3                   Mandi Smith, OT  11/10/2022

## 2022-11-11 LAB
GLUCOSE BLDC GLUCOMTR-MCNC: 128 MG/DL (ref 70–130)
GLUCOSE BLDC GLUCOMTR-MCNC: 215 MG/DL (ref 70–130)
GLUCOSE BLDC GLUCOMTR-MCNC: 290 MG/DL (ref 70–130)

## 2022-11-11 PROCEDURE — 99231 SBSQ HOSP IP/OBS SF/LOW 25: CPT | Performed by: INTERNAL MEDICINE

## 2022-11-11 PROCEDURE — 82962 GLUCOSE BLOOD TEST: CPT

## 2022-11-11 PROCEDURE — 97530 THERAPEUTIC ACTIVITIES: CPT

## 2022-11-11 PROCEDURE — 97140 MANUAL THERAPY 1/> REGIONS: CPT

## 2022-11-11 PROCEDURE — 25010000002 DAPTOMYCIN PER 1 MG: Performed by: INTERNAL MEDICINE

## 2022-11-11 PROCEDURE — 97110 THERAPEUTIC EXERCISES: CPT | Performed by: OCCUPATIONAL THERAPIST

## 2022-11-11 PROCEDURE — 63710000001 INSULIN DETEMIR PER 5 UNITS: Performed by: INTERNAL MEDICINE

## 2022-11-11 PROCEDURE — 99232 SBSQ HOSP IP/OBS MODERATE 35: CPT | Performed by: INTERNAL MEDICINE

## 2022-11-11 PROCEDURE — 63710000001 INSULIN ASPART PER 5 UNITS: Performed by: INTERNAL MEDICINE

## 2022-11-11 PROCEDURE — 97110 THERAPEUTIC EXERCISES: CPT

## 2022-11-11 PROCEDURE — 97116 GAIT TRAINING THERAPY: CPT

## 2022-11-11 PROCEDURE — 97535 SELF CARE MNGMENT TRAINING: CPT | Performed by: OCCUPATIONAL THERAPIST

## 2022-11-11 PROCEDURE — 25010000002 FONDAPARINUX PER 0.5 MG: Performed by: FAMILY MEDICINE

## 2022-11-11 PROCEDURE — 97535 SELF CARE MNGMENT TRAINING: CPT

## 2022-11-11 RX ADMIN — FLUDROCORTISONE ACETATE 50 MCG: 0.1 TABLET ORAL at 08:27

## 2022-11-11 RX ADMIN — ASPIRIN 81 MG: 81 TABLET, COATED ORAL at 08:27

## 2022-11-11 RX ADMIN — HYDROCODONE BITARTRATE AND ACETAMINOPHEN 1 TABLET: 5; 325 TABLET ORAL at 05:17

## 2022-11-11 RX ADMIN — INSULIN DETEMIR 23 UNITS: 100 INJECTION, SOLUTION SUBCUTANEOUS at 21:33

## 2022-11-11 RX ADMIN — PANTOPRAZOLE SODIUM 40 MG: 40 TABLET, DELAYED RELEASE ORAL at 05:18

## 2022-11-11 RX ADMIN — DOCUSATE SODIUM 100 MG: 100 CAPSULE ORAL at 08:27

## 2022-11-11 RX ADMIN — OXYCODONE HYDROCHLORIDE AND ACETAMINOPHEN 500 MG: 500 TABLET ORAL at 08:27

## 2022-11-11 RX ADMIN — HYDROCODONE BITARTRATE AND ACETAMINOPHEN 1 TABLET: 5; 325 TABLET ORAL at 13:47

## 2022-11-11 RX ADMIN — INSULIN DETEMIR 23 UNITS: 100 INJECTION, SOLUTION SUBCUTANEOUS at 09:16

## 2022-11-11 RX ADMIN — INSULIN ASPART 5 UNITS: 100 INJECTION, SOLUTION INTRAVENOUS; SUBCUTANEOUS at 12:46

## 2022-11-11 RX ADMIN — POLYETHYLENE GLYCOL (3350) 17 G: 17 POWDER, FOR SOLUTION ORAL at 08:28

## 2022-11-11 RX ADMIN — Medication 1 TABLET: at 08:27

## 2022-11-11 RX ADMIN — DAPTOMYCIN 500 MG: 500 INJECTION, POWDER, LYOPHILIZED, FOR SOLUTION INTRAVENOUS at 18:01

## 2022-11-11 RX ADMIN — CARBIDOPA AND LEVODOPA 10 MG: 50; 200 TABLET, EXTENDED RELEASE ORAL at 12:46

## 2022-11-11 RX ADMIN — Medication 150 MG: at 08:27

## 2022-11-11 RX ADMIN — DOCUSATE SODIUM 100 MG: 100 CAPSULE ORAL at 21:33

## 2022-11-11 RX ADMIN — CARBIDOPA AND LEVODOPA 10 MG: 50; 200 TABLET, EXTENDED RELEASE ORAL at 16:56

## 2022-11-11 RX ADMIN — PREGABALIN 100 MG: 100 CAPSULE ORAL at 08:27

## 2022-11-11 RX ADMIN — FONDAPARINUX SODIUM 2.5 MG: 2.5 INJECTION, SOLUTION SUBCUTANEOUS at 08:27

## 2022-11-11 RX ADMIN — PREGABALIN 100 MG: 100 CAPSULE ORAL at 21:33

## 2022-11-11 RX ADMIN — LEVOTHYROXINE SODIUM 25 MCG: 0.07 TABLET ORAL at 05:18

## 2022-11-11 RX ADMIN — INSULIN ASPART 8 UNITS: 100 INJECTION, SOLUTION INTRAVENOUS; SUBCUTANEOUS at 16:56

## 2022-11-11 RX ADMIN — CARBIDOPA AND LEVODOPA 10 MG: 50; 200 TABLET, EXTENDED RELEASE ORAL at 06:53

## 2022-11-11 NOTE — THERAPY TREATMENT NOTE
Inpatient Rehabilitation - Occupational Therapy Treatment Note    YVONNE Gaines     Patient Name: Melchor Hardwick III  : 1965  MRN: [id number]    Today's Date: 2022                 Admit Date: 10/28/2022         ICD-10-CM ICD-9-CM   1. Staphylococcal arthritis of right knee (HCC)  M00.061 711.06     041.10       Patient Active Problem List   Diagnosis   • Hyperlipidemia   • Hypertensive disorder   • Obesity   • Type 2 diabetes mellitus with hyperglycemia, with long-term current use of insulin (HCC)   • Gas gangrene of foot (HCC)   • Cellulitis in diabetic foot (HCC)   • Other acute osteomyelitis of left foot (HCC)   • Osteomyelitis (HCC)   • Critical illness myopathy   • Staphylococcal arthritis of right knee (HCC)   • Other cirrhosis of liver (HCC)   • MRSA bacteremia   • Endocarditis of tricuspid valve   • Wound of right buttock   • History of osteomyelitis   • Debility       Past Medical History:   Diagnosis Date   • Diabetes mellitus (HCC)    • Hyperlipidemia    • Hypertension    • Pyogenic arthritis of right knee joint, due to unspecified organism (HCC) 2022       Past Surgical History:   Procedure Laterality Date   • ABSCESS DRAINAGE      PERINEUM   • AMPUTATION FOOT Left 2022    Procedure: AMPUTATION FOOT;  Surgeon: Addison Colby MD;  Location: [facility] OR;  Service: Podiatry;  Laterality: Left;   • INCISION AND DRAINAGE LEG Left 05/10/2022    Procedure: INCISION AND DRAINAGE LOWER EXTREMITY;  Surgeon: Addison Colby MD;  Location: [facility] OR;  Service: Podiatry;  Laterality: Left;   • KNEE INCISION AND DRAINAGE Right 2022    Procedure: KNEE INCISION AND DRAINAGE RIGHT;  Surgeon: Brain Bentley MD;  Location: [facility] OR;  Service: Orthopedics;  Laterality: Right;   • WOUND DEBRIDEMENT Left 2022    Procedure: DEBRIDEMENT FOOT;  Surgeon: Addison Colby MD;  Location: [facility] OR;  Service: Podiatry;  Laterality: Left;             IRF OT ASSESSMENT FLOWSHEET (last 12 hours)      IRF OT Evaluation and Treatment     [facility] Name 11/11/22 1249 11/11/22 1243       OT Time and Intention    Document Type daily treatment  - daily treatment  -LA    Mode of Treatment individual therapy;occupational therapy  - individual therapy;occupational therapy  -LA    Patient Effort good  - good  -LA    Symptoms Noted During/After Treatment -- increased pain  Patient seen this date after PT treatment. Patient with increased knee and back pain reported.  -[facility] Name 11/11/22 1249 11/11/22 1243       General Information    Patient Profile Reviewed -- yes  -LA    Patient/Family/Caregiver Comments/Observations patient agreeable to therapy. patient tolerated well with no complaints.  - --    General Observations of Patient -- Patient agreeable to therapy treatment this date. Patient pleasant and cooperative with good participation this date.  -LA    Existing Precautions/Restrictions fall  - fall;brace worn when out of bed  aircast LLE, abdominal binder  -[facility] Name 11/11/22 1243          Cognition/Psychosocial    Affect/Mental Status (Cognition) WFL  -LA     Orientation Status (Cognition) oriented x 4  -LA     Follows Commands (Cognition) WFL  -LA     Personal Safety Interventions gait belt  -LA     Row Name 11/11/22 1243          Lower Body Dressing    [place] Level (Lower Body Dressing) minimum assist (75% patient effort);moderate assist (50% patient effort)  -LA     Row Name 11/11/22 1243          Bed Mobility    All Activities, [place] (Bed Mobility) modified independence  -LA     Assistive Device (Bed Mobility) head of bed elevated;bed rails  -LA     Row Name 11/11/22 1243          Bed-Chair Transfer    Bed-Chair [place] (Transfers) minimum assist (75% patient effort);verbal cues;nonverbal cues (demo/gesture)  -LA     Row Name 11/11/22 1249          Chair-Bed Transfer    Chair-Bed [place] (Transfers) minimum assist (75% patient effort);verbal cues  -     Assistive Device  (Chair-Bed Transfers) walker, front-wheeled  -     Row Name 11/11/22 1243          Sit-Stand Transfer    Sit-Stand [place] (Transfers) contact guard  -LA     Row Name 11/11/22 1249 11/11/22 1243       Motor Skills    Motor Skills functional endurance;coordination  - functional endurance  -LA    Functional Endurance -- good-  -LA    Motor Control/Coordination Interventions -- occupation/activity based treatment  -LA    Therapeutic Exercise --  [place], KAREN ROM, erinnel ex.,bilateral coordination  - --    Row Name 11/11/22 1249 11/11/22 1243       Positioning and Restraints    Pre-Treatment Position -- in bed  -LA    Post Treatment Position bed  - wheelchair  -LA    In Bed supine;call light within reach;encouraged to call for assist  - --    In Wheelchair -- sitting;with OT  -LA          User Key  (r) = Recorded By, (t) = Taken By, (c) = Cosigned By    Initials Name Effective Dates     Jazmin Goldman, OT 06/16/21 -     Jaqueline Mo, OT 02/14/22 -                  Occupational Therapy Education     Title: PT OT SLP Therapies (Done)     Topic: Occupational Therapy (Done)     Point: ADL training (Done)     Description:   Instruct learner(s) on proper safety adaptation and remediation techniques during self care or transfers.   Instruct in proper use of assistive devices.              Learning Progress Summary           Patient Acceptance, E,TB,D, VU,DU,NR by LA at 11/11/2022 1248    Acceptance, E, VU,NR by BF at 11/10/2022 1431    Acceptance, E, VU,NR by HB at 11/9/2022 1230    Acceptance, E,TB, VU by DG at 11/9/2022 0050    Acceptance, E, VU,NR by HB at 11/8/2022 1426    Acceptance, E,TB, VU by DG at 11/8/2022 0114    Acceptance, E, VU,NR by HB at 11/7/2022 1155    Acceptance, E,TB, VU by DG at 11/6/2022 2023    Acceptance, E, VU,NR by HB at 11/4/2022 1153    Acceptance, E, VU,NR by HB at 11/3/2022 1428    Acceptance, E,TB, VU by DG at 11/1/2022 2016    Acceptance, E,TB, VU by DG at 10/31/2022  2315    Acceptance, E, VU,NR by BF at 10/31/2022 1429    Acceptance, E,TB, VU by DL at 10/31/2022 0101    Acceptance, E,TB, VU by DL at 10/29/2022 2321    Acceptance, E, VU,NR by HB at 10/29/2022 1137                   Point: Home exercise program (Done)     Description:   Instruct learner(s) on appropriate technique for monitoring, assisting and/or progressing therapeutic exercises/activities.              Learning Progress Summary           Patient Acceptance, E,TB,D, VU,DU,NR by LA at 11/11/2022 1248    Acceptance, E, VU,NR by BF at 11/10/2022 1431    Acceptance, E, VU,NR by HB at 11/9/2022 1230    Acceptance, E,TB, VU by DG at 11/9/2022 0050    Acceptance, E, VU,NR by HB at 11/8/2022 1426    Acceptance, E,TB, VU by DG at 11/8/2022 0114    Acceptance, E, VU,NR by HB at 11/7/2022 1155    Acceptance, E,TB, VU by DG at 11/6/2022 2023    Acceptance, E, VU,NR by HB at 11/4/2022 1153    Acceptance, E, VU,NR by HB at 11/3/2022 1428    Acceptance, E,TB, VU by DG at 11/1/2022 2016    Acceptance, E,TB, VU by DG at 10/31/2022 2315    Acceptance, E,TB, VU by DL at 10/31/2022 0101    Acceptance, E,TB, VU by DL at 10/29/2022 2321    Acceptance, E, VU,NR by HB at 10/29/2022 1137                   Point: Precautions (Done)     Description:   Instruct learner(s) on prescribed precautions during self-care and functional transfers.              Learning Progress Summary           Patient Acceptance, E,TB,D, VU,DU,NR by LA at 11/11/2022 1248    Acceptance, E, VU,NR by HB at 11/9/2022 1230    Acceptance, E,TB, VU by DG at 11/9/2022 0050    Acceptance, E, VU,NR by HB at 11/8/2022 1426    Acceptance, E,TB, VU by DG at 11/8/2022 0114    Acceptance, E, VU,NR by HB at 11/7/2022 1155    Acceptance, E,TB, VU by DG at 11/6/2022 2023    Acceptance, E, VU,NR by HB at 11/4/2022 1153    Acceptance, E, VU,NR by HB at 11/3/2022 1428    Acceptance, E,TB, VU by DG at 11/1/2022 2016    Acceptance, E,TB, VU by DG at 10/31/2022 2315    Acceptance, E,  VU,NR by BF at 10/31/2022 1429    Acceptance, E,TB, VU by DL at 10/31/2022 0101    Acceptance, E,TB, VU by DL at 10/29/2022 2321    Acceptance, E, VU,NR by HB at 10/29/2022 1137                   Point: Body mechanics (Done)     Description:   Instruct learner(s) on proper positioning and spine alignment during self-care, functional mobility activities and/or exercises.              Learning Progress Summary           Patient Acceptance, E,TB,D, VU,DU,NR by LA at 11/11/2022 1248    Acceptance, E, VU,NR by HB at 11/9/2022 1230    Acceptance, E,TB, VU by DG at 11/9/2022 0050    Acceptance, E, VU,NR by HB at 11/8/2022 1426    Acceptance, E,TB, VU by DG at 11/8/2022 0114    Acceptance, E, VU,NR by HB at 11/7/2022 1155    Acceptance, E,TB, VU by DG at 11/6/2022 2023    Acceptance, E, VU,NR by HB at 11/4/2022 1153    Acceptance, E, VU,NR by HB at 11/3/2022 1428    Acceptance, E,TB, VU by DG at 11/1/2022 2016    Acceptance, E,TB, VU by DG at 10/31/2022 2315    Acceptance, E,TB, VU by DL at 10/31/2022 0101    Acceptance, E,TB, VU by DL at 10/29/2022 2321    Acceptance, E, VU,NR by HB at 10/29/2022 1137                               User Key     Initials Effective Dates Name Provider Type Discipline    DG 06/16/21 -  Phyllis Yañez, RN Registered Nurse Nurse    BF 06/16/21 -  Mandi Smith OT Occupational Therapist OT    HB 05/25/21 -  Dorothy Rosas OT Occupational Therapist OT    LA 02/14/22 -  Jaqueline Bautista OT Occupational Therapist OT    DL 03/16/22 -  Hellen Mcdonnell, RN Registered Nurse Nurse                    OT Recommendation and Plan                         Time Calculation:      Time Calculation- OT     Row Name 11/11/22 1254 11/11/22 1249          Time Calculation- OT    OT Start Time 1045  -AH 0930  -LA     OT Stop Time 1130  - 1015  -LA     OT Time Calculation (min) 45 min  - 45 min  -LA     Total Timed Code Minutes- OT -- 45 minute(s)  -LA           User Key  (r) = Recorded By, (t) = Taken By,  (c) = Cosigned By    Initials Name Provider Type     Jazmin Goldman, OT Occupational Therapist    Jaqueline Mo OT Occupational Therapist              Therapy Charges for Today     Code Description Service Date Service Provider Modifiers Qty    64253761612 HC OT SELF CARE/MGMT/TRAIN EA 15 MIN 11/11/2022 Jazmin Goldman, OT GO 1    47179746170 HC OT THER PROC EA 15 MIN 11/11/2022 Jazmin Goldman OT GO 2                   Jazmin Goldman OT  11/11/2022

## 2022-11-11 NOTE — PROGRESS NOTES
Assisted By: Patient seen during physical therapy    CC: Follow-up on septic arthritis and bacteremia with general debility status post prolonged hospitalization.  Patient also has had a transmetatarsal amputation of the left foot in the past.    Interview History/HPI: Patient states he is doing okay, he is complaining of some low back pain.  He states it feels like something is pinching there, it happened after he got up from laying on his stomach doing exercises.  He states he has had this pain before, there is no radiation of pain down his legs.  Otherwise he denies any chest pain or shortness of breath.  He is not felt dizzy when up with therapy.          Current Hospital Meds:  ascorbic acid, 500 mg, Oral, Daily  aspirin, 81 mg, Oral, Daily  DAPTOmycin, 6 mg/kg (Adjusted), Intravenous, Q24H  docusate sodium, 100 mg, Oral, BID  fludrocortisone, 50 mcg, Oral, Daily  fondaparinux, 2.5 mg, Subcutaneous, Q24H  Insulin Aspart, 0-14 Units, Subcutaneous, TID AC  insulin detemir, 23 Units, Subcutaneous, Q12H  iron polysaccharides, 150 mg, Oral, Daily  levothyroxine, 25 mcg, Oral, Q AM  midodrine, 10 mg, Oral, TID AC  multivitamin with minerals, 1 tablet, Oral, Daily  pantoprazole, 40 mg, Oral, Q AM  polyethylene glycol, 17 g, Oral, Daily  pregabalin, 100 mg, Oral, Q12H    Pharmacy Consult,         Vitals:    11/11/22 0745   BP: 154/71   Pulse: 82   Resp: 18   Temp: 98.5 °F (36.9 °C)   SpO2: 98%         Intake/Output Summary (Last 24 hours) at 11/11/2022 1402  Last data filed at 11/11/2022 0900  Gross per 24 hour   Intake 1200 ml   Output 1725 ml   Net -525 ml       EXAM: The mid lumbar area around L3-L4 is mildly tender to palpation.  No CVA tenderness, lungs are clear heart regular rate and rhythm.  Binder is in place.  He has a boot on the left foot where he has had the transmetatarsal amputation.  His right knee incision is excellently healed.  He cannot flex his knee to 90 degrees fully as of yet.  He cannot extend  his knee but not to full 180 degrees.  Upper extremity strength is symmetric.      Diet Regular; Thin; Consistent Carbohydrate        LABS:     Lab Results (last 48 hours)     Procedure Component Value Units Date/Time    POC Glucose Once [[id number]]  (Abnormal) Collected: 11/11/22 1142    Specimen: Blood Updated: 11/11/22 1202     Glucose 215 mg/dL      Comment: Meter: [id number] : [id number] Negretephilipp Hummela       POC Glucose Once [[id number]]  (Normal) Collected: 11/11/22 0555    Specimen: Blood Updated: 11/11/22 0608     Glucose 128 mg/dL      Comment: Meter: [id number] : [id number] Sun[employer] Rosie       POC Glucose Once [[id number]]  (Abnormal) Collected: 11/10/22 1553    Specimen: Blood Updated: 11/10/22 1558     Glucose 253 mg/dL      Comment: Meter: [id number] : [id number] SAEZ JUAN CARLOS       POC Glucose Once [[id number]]  (Abnormal) Collected: 11/10/22 1054    Specimen: Blood Updated: 11/10/22 1100     Glucose 250 mg/dL      Comment: Meter: [id number] : [id number] SAEZ JUAN CARLOS       POC Glucose Once [[id number]]  (Normal) Collected: 11/10/22 0546    Specimen: Blood Updated: 11/10/22 0615     Glucose 76 mg/dL      Comment: Meter: [id number] : [id number] [employer]       Basic Metabolic Panel [[id number]]  (Abnormal) Collected: 11/10/22 0102    Specimen: Blood Updated: 11/10/22 0152     Glucose 110 mg/dL      BUN 11 mg/dL      Creatinine 0.62 mg/dL      Sodium 137 mmol/L      Potassium 3.9 mmol/L      Chloride 103 mmol/L      CO2 23.7 mmol/L      Calcium 8.4 mg/dL      BUN/Creatinine Ratio 17.7     Anion Gap 10.3 mmol/L      eGFR 111.5 mL/min/1.73      Comment: National Kidney Foundation and American Society of Nephrology (ASN) Task Force recommended calculation based on the Chronic Kidney Disease Epidemiology Collaboration (CKD-EPI) equation refit without adjustment for race.       Narrative:      GFR Normal >60  Chronic Kidney Disease <60  Kidney Failure <15      CBC & Differential  [[id number]]  (Abnormal) Collected: 11/10/22 0102    Specimen: Blood Updated: 11/10/22 0126    Narrative:      The following orders were created for panel order CBC & Differential.  Procedure                               Abnormality         Status                     ---------                               -----------         ------                     CBC Auto Differential[[id number]]        Abnormal            Final result                 Please view results for these tests on the individual orders.    CBC Auto Differential [[id number]]  (Abnormal) Collected: 11/10/22 0102    Specimen: Blood Updated: 11/10/22 0126     WBC 3.01 10*3/mm3      RBC 3.46 10*6/mm3      Hemoglobin 9.0 g/dL      Hematocrit 29.0 %      MCV 83.8 fL      MCH 26.0 pg      MCHC 31.0 g/dL      RDW 18.0 %      RDW-SD 55.8 fl      MPV 9.9 fL      Platelets 97 10*3/mm3      Neutrophil % 56.9 %      Lymphocyte % 30.9 %      Monocyte % 9.6 %      Eosinophil % 2.3 %      Basophil % 0.3 %      Immature Grans % 0.0 %      Neutrophils, Absolute 1.71 10*3/mm3      Lymphocytes, Absolute 0.93 10*3/mm3      Monocytes, Absolute 0.29 10*3/mm3      Eosinophils, Absolute 0.07 10*3/mm3      Basophils, Absolute 0.01 10*3/mm3      Immature Grans, Absolute 0.00 10*3/mm3      nRBC 0.0 /100 WBC     POC Glucose Once [[id number]]  (Abnormal) Collected: 11/09/22 1559    Specimen: Blood Updated: 11/09/22 1605     Glucose 186 mg/dL      Comment: Meter: [id number] : [id number] SAEZBONNIE AVILESA                  Radiology:    Imaging Results (Last 72 Hours)     ** No results found for the last 72 hours. **          Results for orders placed during the hospital encounter of 10/28/22    Adult Transesophageal Echo (TYRESE) W/ Cont if Necessary Per Protocol    Interpretation Summary  •  Indication for TYRESE -to rule out infective endocarditis in a patient with MRSA bacteremia.  •  Findings-  •  Left ventricular systolic function is normal. Estimated left ventricular EF = 60%  •  Left  atrial appendage is well visualized and is free of thrombus.  •  Saline test was negative for the evidence of shunt in interatrial septum.  •  The tricuspid valve appeared normal in structure. There was a moderate sized echodense structure seen above  the posterior tricuspid leaflet but not attached to the leaflet. The appearance and location are not typical for regurgitation. This structure could be a normal variant. No evidence of tricuspid valve stenosis or significant regurgitations.  •  Other valves also appear normal in structure with no evidence of stenosis or significant regurgitations.  No vegetations seen on these valves.  •  There is no evidence of pericardial effusion.  •  Comment-  •  In view of presence of MRSA bacteremia, recommend to extend antibiotic therapy for possible infective endocarditis and repeat TYRESE in 3 months.      Assessment/Plan:   Generalized debility associated with the prolonged hospitalization, bacteremia, septic arthritis and history of transmetatarsal amputation of the left foot.  Patient is eagerly working with therapy.  With Occupational Therapy patient is modified independent for bed mobility, minimum to moderate assistance with lower body dressing, bed to chair minimal assistance, contact-guard for sit to stand.  With physical therapy is contact-guard with transfers.  Car transfer contact-guard sit pivot stand step.  Patient today walked 60 feet x 2 with a front wheel walker contact-guard.  Patient is progressing towards goals.    History of bacteremia, probable endocarditis, being treated as such, antibiotics to end 11/21, will plan TYRESE on that day and patient may be discharged 11/22 depending on findings.  Discussed with infectious disease, continue daptomycin with weekly CPK check.    Diabetes, there are some outliers above 200 consistent with hyperglycemia but overall control, continue to follow, want to avoid hypoglycemia if possible.    Orthostasis, on Florinef and  midodrine, seems to be resolved.  Possibly next week may begin to wean midodrine.    History of [place] with thrombocytopenia and leukopenia, recheck labs in AM.    DVT prophylaxis, continue Arixtra.    Patient is receiving Norco about twice a day, monitoring for any evidence of opiate induced constipation, patient is on MiraLAX, no BM yesterday, if no BM in the next 24 hours consider additional cathartics.    Ja Bowers MD

## 2022-11-11 NOTE — PROGRESS NOTES
Occupational Therapy:    Physical Therapy: Individual: 90 minutes.    Speech Language Pathology:    Signed by: Forest Zepeda PT

## 2022-11-11 NOTE — PROGRESS NOTES
PROGRESS NOTE         Patient Identification:  Name:  Melchor Hardwick III  Age:  57 y.o.  Sex:  male  :  1965  MRN:  [id number]  Visit Number:  [id number]  Primary Care Provider:  Missy Up APRN         LOS: 14 days       ----------------------------------------------------------------------------------------------------------------------  Subjective       Chief Complaints:    No chief complaint on file.        Interval History:      Patient working with physical therapy this morning.  Complains of some right knee swelling with low-grade fever of 99.1 overnight.  No diarrhea.  No erythema or drainage from right knee site.  Currently on room air with no apparent distress. Dr. Bowers discussed TYRESE reading with Dr. Farfan who agrees with moderate-sized echodense structure seen above the posterior tricuspid leaflet but not attached to leaflet.  Recommends to continue antibiotic therapy and treat as if endocarditis.  Recommend repeat TYRESE upon completion of antibiotic therapy.    Review of Systems:    Constitutional: no fever, chills and night sweats.  Generalized fatigue.  Eyes: no eye drainage, itching or redness.  HEENT: no mouth sores, dysphagia or nose bleed.  Respiratory: no for shortness of breath, cough or production of sputum.  Cardiovascular: no chest pain, no palpitations, no orthopnea.  Gastrointestinal: no nausea, vomiting or diarrhea. No abdominal pain, hematemesis or rectal bleeding.  Genitourinary: no dysuria or polyuria.  Hematologic/lymphatic: no lymph node abnormalities, no easy bruising or easy bleeding.  Musculoskeletal: no muscle or joint pain.  Stiff right knee.  Skin: No rash and no itching.  Neurological: no loss of consciousness, no seizure, no headache.  Behavioral/Psych: no depression or suicidal ideation.  Endocrine: no hot flashes.  Immunologic:  negative.    ----------------------------------------------------------------------------------------------------------------------      Objective       [facility] Meds:  ascorbic acid, 500 mg, Oral, Daily  aspirin, 81 mg, Oral, Daily  DAPTOmycin, 6 mg/kg (Adjusted), Intravenous, Q24H  docusate sodium, 100 mg, Oral, BID  fludrocortisone, 50 mcg, Oral, Daily  fondaparinux, 2.5 mg, Subcutaneous, Q24H  Insulin Aspart, 0-14 Units, Subcutaneous, TID AC  insulin detemir, 23 Units, Subcutaneous, Q12H  iron polysaccharides, 150 mg, Oral, Daily  levothyroxine, 25 mcg, Oral, Q AM  midodrine, 10 mg, Oral, TID AC  multivitamin with minerals, 1 tablet, Oral, Daily  pantoprazole, 40 mg, Oral, Q AM  polyethylene glycol, 17 g, Oral, Daily  pregabalin, 100 mg, Oral, Q12H      Pharmacy Consult,       ----------------------------------------------------------------------------------------------------------------------    Vital Signs:  Temp:  [98.5 °F (36.9 °C)-99.1 °F (37.3 °C)] 98.5 °F (36.9 °C)  Heart Rate:  [82-89] 82  Resp:  [18-20] 18  BP: (110-154)/(54-71) 154/71  No data found.  SpO2 Percentage    11/10/22 0729 11/10/22 1910 11/11/22 0745   SpO2: 98% 97% 98%     SpO2:  [97 %-98 %] 98 %  on   ;   Device (Oxygen Therapy): room air    Body mass index is 28.34 kg/m².  Wt Readings from Last 3 Encounters:   11/01/22 89.6 kg (197 lb 8.5 oz)   09/23/22 98 kg (216 lb)   06/17/22 115 kg (254 lb)        Intake/Output Summary (Last 24 hours) at 11/11/2022 0928  Last data filed at 11/11/2022 0454  Gross per 24 hour   Intake 1560 ml   Output 2000 ml   Net -440 ml     Diet Regular; Thin; Consistent Carbohydrate  ----------------------------------------------------------------------------------------------------------------------      Physical Exam:    Constitutional: Chronically ill-appearing male is working with occupational therapy this morning.  Appears comfortable and in no apparent distress.  HENT:  Head: Normocephalic and  atraumatic.  Mouth:  Moist mucous membranes.    Eyes:  Conjunctivae and EOM are normal.  No scleral icterus.  Neck:  Neck supple.  No JVD present.    Cardiovascular:  Normal rate, regular rhythm and normal heart sounds with no murmur. No edema.  Pulmonary/Chest:  No respiratory distress, no wheezes, no crackles, with normal breath sounds and good air movement.  Abdominal:  Soft.  Bowel sounds are normal.  No distension and no tenderness.   Musculoskeletal:  No edema, no tenderness, and no deformity.  No swelling or redness of joints.  Neurological:  Alert and oriented to person, place, and time.  No facial droop.  No slurred speech.   Skin:  Skin is warm and dry.  No rash noted.  No pallor.  Right knee surgical incision with good healing and staples removed.  Mild edema noted to right knee with no erythema or drainage noted.  Psychiatric:  Normal mood and affect.  Behavior is normal.    ----------------------------------------------------------------------------------------------------------------------  Results from last 7 days   Lab Units 11/06/22  0032   CK TOTAL U/L 29           Results from last 7 days   Lab Units 11/10/22  0102 11/08/22  0135 11/05/22  0111   WBC 10*3/mm3 3.01* 3.10* 3.11*   HEMOGLOBIN g/dL 9.0* 8.8* 8.9*   HEMATOCRIT % 29.0* 28.5* 28.7*   MCV fL 83.8 85.3 83.2   MCHC g/dL 31.0* 30.9* 31.0*   PLATELETS 10*3/mm3 97* 110* 90*     Results from last 7 days   Lab Units 11/10/22  0102 11/09/22  0144 11/08/22  0400   SODIUM mmol/L 137 134* 133*   POTASSIUM mmol/L 3.9 4.2 4.2   CHLORIDE mmol/L 103 101 99   CO2 mmol/L 23.7 26.2 25.1   BUN mg/dL 11 12 14   CREATININE mg/dL 0.62* 0.82 0.78   CALCIUM mg/dL 8.4* 8.4* 8.3*   GLUCOSE mg/dL 110* 181* 257*   Estimated Creatinine Clearance: 148 mL/min (A) (by C-G formula based on SCr of 0.62 mg/dL (L)).  No results found for: AMMONIA    Glucose   Date/Time Value Ref Range Status   11/11/2022 0555 128 70 - 130 mg/dL Final     Comment:     Meter: [id number]  : [id number] Susi Velez   11/10/2022 1553 253 (H) 70 - 130 mg/dL Final     Comment:     Meter: [id number] : [id number] NADJA AVILESA   11/10/2022 1054 250 (H) 70 - 130 mg/dL Final     Comment:     Meter: [id number] : [id number] NADJA AVILESA   11/10/2022 0546 76 70 - 130 mg/dL Final     Comment:     Meter: [id number] : [id number] Susi Velez   11/09/2022 1559 186 (H) 70 - 130 mg/dL Final     Comment:     Meter: [id number] : [id number] SAEZBONNIE AVILESA   11/09/2022 1041 191 (H) 70 - 130 mg/dL Final     Comment:     Meter: [id number] : [id number] NADJA AVILESA   11/09/2022 0709 105 70 - 130 mg/dL Final     Comment:     Meter: [id number] : [id number] Rosebud Crystal   11/08/2022 2006 261 (H) 70 - 130 mg/dL Final     Comment:     Meter: [id number] : [id number] Raul Wu     Lab Results   Component Value Date    HGBA1C 8.80 (H) 09/20/2022     Lab Results   Component Value Date    TSH 2.390 10/10/2022    FREET4 0.87 (L) 10/10/2022       Blood Culture   Date Value Ref Range Status   10/24/2022 No growth at 5 days  Final   10/24/2022 No growth at 5 days  Final     No results found for: URINECX  No results found for: WOUNDCX  No results found for: STOOLCX  No results found for: RESPCX  Pain Management Panel     Pain Management Panel Latest Ref Rng & Units 5/24/2022 5/11/2022    CREATININE UR mg/dL 91.0 -    AMPHETAMINES SCREEN, URINE Negative - Negative    BARBITURATES SCREEN Negative - Negative    BENZODIAZEPINE SCREEN, URINE Negative - Negative    BUPRENORPHINEUR Negative - Negative    COCAINE SCREEN, URINE Negative - Negative    METHADONE SCREEN, URINE Negative - Negative    METHAMPHETAMINEUR Negative - Negative            ----------------------------------------------------------------------------------------------------------------------  Imaging Results (Last 24 Hours)     ** No results found for the last 24 hours. **           ----------------------------------------------------------------------------------------------------------------------    Pertinent Infectious Disease Results    CPK on 11/6/2022 remains normal at 29.  COVID-19 PCR from 10/28/2022 negative.  Blood cultures from 10/18/2022 2 out of 2 sets positive for MRSA.  Blood cultures from 10/20/2022 2 out of 2 sets positive for MRSA.  Blood cultures from 10/22/2022 1 out of 2 sets positive for MRSA.  Blood cultures from 10/24/2022 finalized as no growth.  2D echo from 10/24/2022 reports no evidence of vegetation.  Updated TYRESE on 11/1/2022 showed a moderate sized echodense structure on the posterior tricuspid leaflet but not attached to the leaflet.  The appearance and location are not typical for regurgitation.  This structure could be a normal variant.  No evidence of tricuspid valve stenosis or significant regurgitations.  Other valves also appear normal in structure with no evidence of stenosis or significant regurgitations.  No vegetation seen on these valves.      Assessment/Plan       Assessment     Right knee septic arthritis  MRSA bacteremia  Possible endocarditis    Plan      I saw and examined the patient myself this morning with ROSALINA Caballero with whom I discussed the plan of care and primary RN and here are my findings:  Patient seems to be comfortable in no acute distress was working with physical therapy earlier this morning and now he is in his bed with right knee pain and swelling and he believes he might have overdone it with therapy.  Low-grade fever overnight at 99.1 °F which is concerning but no diarrhea reported.  No erythema or drainage from the right knee.    Patient is currently on room air and lungs are clear to auscultation with no crackles wheezing or rhonchi and I discussed the case with Dr. Bowers regarding his TYRESE reading with Dr. Farfan who agrees with moderate-sized echodense structure most likely to be a vegetation.    Preliminary  plan for now is to continue with daptomycin through 11/20/2022 with plan for repeat TYRESE on 11/21/2022 prior to his discharge on 11/22/2022.    I am concerned about his new onset low-grade fever and I ordered a CRP in AM.      Code Status:   Code Status and Medical Interventions:   Ordered at: 11/03/22 1015     Level Of Support Discussed With:    Patient     Code Status (Patient has no pulse and is not breathing):    CPR (Attempt to Resuscitate)     Medical Interventions (Patient has pulse or is breathing):    Full Support     Release to patient:    Routine Release       ROSALINA Caballero  11/11/22  09:28 EST     Electronically signed by ROSALINA Caballero, 11/11/22, 9:31 AM EST.      Electronically signed by Marilynn Giordano MD, 11/11/22, 12:06 PM EST.

## 2022-11-11 NOTE — THERAPY TREATMENT NOTE
Inpatient Rehabilitation - Occupational Therapy Treatment Note    YVONNE Gaines     Patient Name: Melchor Hardwick III  : 1965  MRN: [id number]    Today's Date: 2022                 Admit Date: 10/28/2022         ICD-10-CM ICD-9-CM   1. Staphylococcal arthritis of right knee (HCC)  M00.061 711.06     041.10       Patient Active Problem List   Diagnosis   • Hyperlipidemia   • Hypertensive disorder   • Obesity   • Type 2 diabetes mellitus with hyperglycemia, with long-term current use of insulin (HCC)   • Gas gangrene of foot (HCC)   • Cellulitis in diabetic foot (HCC)   • Other acute osteomyelitis of left foot (HCC)   • Osteomyelitis (HCC)   • Critical illness myopathy   • Staphylococcal arthritis of right knee (HCC)   • Other cirrhosis of liver (HCC)   • MRSA bacteremia   • Endocarditis of tricuspid valve   • Wound of right buttock   • History of osteomyelitis   • Debility       Past Medical History:   Diagnosis Date   • Diabetes mellitus (HCC)    • Hyperlipidemia    • Hypertension    • Pyogenic arthritis of right knee joint, due to unspecified organism (HCC) 2022       Past Surgical History:   Procedure Laterality Date   • ABSCESS DRAINAGE      PERINEUM   • AMPUTATION FOOT Left 2022    Procedure: AMPUTATION FOOT;  Surgeon: Addison Colby MD;  Location: [facility] OR;  Service: Podiatry;  Laterality: Left;   • INCISION AND DRAINAGE LEG Left 05/10/2022    Procedure: INCISION AND DRAINAGE LOWER EXTREMITY;  Surgeon: Addison Colby MD;  Location: [facility] OR;  Service: Podiatry;  Laterality: Left;   • KNEE INCISION AND DRAINAGE Right 2022    Procedure: KNEE INCISION AND DRAINAGE RIGHT;  Surgeon: Brain Bentley MD;  Location: [facility] OR;  Service: Orthopedics;  Laterality: Right;   • WOUND DEBRIDEMENT Left 2022    Procedure: DEBRIDEMENT FOOT;  Surgeon: Addison Colby MD;  Location: [facility] OR;  Service: Podiatry;  Laterality: Left;             IRF OT ASSESSMENT FLOWSHEET (last 12 hours)      IRF OT Evaluation and Treatment     [facility] Name 11/11/22 1243          OT Time and Intention    Document Type daily treatment  -LA     Mode of Treatment individual therapy;occupational therapy  -LA     Patient Effort good  -LA     Symptoms Noted During/After Treatment increased pain  Patient seen this date after PT treatment. Patient with increased knee and back pain reported.  -[facility] Name 11/11/22 1243          General Information    Patient Profile Reviewed yes  -LA     General Observations of Patient Patient agreeable to therapy treatment this date. Patient pleasant and cooperative with good participation this date.  -LA     Existing Precautions/Restrictions fall;brace worn when out of bed  aircast LLE, abdominal binder  -[facility] Name 11/11/22 1243          Cognition/Psychosocial    Affect/Mental Status (Cognition) WFL  -LA     Orientation Status (Cognition) oriented x 4  -LA     Follows Commands (Cognition) WFL  -LA     Personal Safety Interventions gait belt  -[facility] Name 11/11/22 1243          Lower Body Dressing    [place] Level (Lower Body Dressing) minimum assist (75% patient effort);moderate assist (50% patient effort)  -LA     Row Name 11/11/22 1243          Bed Mobility    All Activities, [place] (Bed Mobility) modified independence  -LA     Assistive Device (Bed Mobility) head of bed elevated;bed rails  -[facility] Name 11/11/22 1243          Bed-Chair Transfer    Bed-Chair [place] (Transfers) minimum assist (75% patient effort);verbal cues;nonverbal cues (demo/gesture)  -LA     Row Name 11/11/22 1243          Sit-Stand Transfer    Sit-Stand [place] (Transfers) contact guard  -LA     Row Name 11/11/22 1243          Motor Skills    Motor Skills functional endurance  -LA     Functional Endurance good-  -LA     Motor Control/Coordination Interventions occupation/activity based treatment  -[facility] Name 11/11/22 1243          Positioning and Restraints    Pre-Treatment  Position in bed  -LA     Post Treatment Position wheelchair  -LA     In Wheelchair sitting;with OT  -LA           User Key  (r) = Recorded By, (t) = Taken By, (c) = Cosigned By    Initials Name Effective Dates    Jaqueline Mo, PAYAM 02/14/22 -                  Occupational Therapy Education     Title: PT OT SLP Therapies (Done)     Topic: Occupational Therapy (Done)     Point: ADL training (Done)     Description:   Instruct learner(s) on proper safety adaptation and remediation techniques during self care or transfers.   Instruct in proper use of assistive devices.              Learning Progress Summary           Patient Acceptance, E,TB,D, VU,DU,NR by LA at 11/11/2022 1248    Acceptance, E, VU,NR by BF at 11/10/2022 1431    Acceptance, E, VU,NR by HB at 11/9/2022 1230    Acceptance, E,TB, VU by DG at 11/9/2022 0050    Acceptance, E, VU,NR by HB at 11/8/2022 1426    Acceptance, E,TB, VU by DG at 11/8/2022 0114    Acceptance, E, VU,NR by HB at 11/7/2022 1155    Acceptance, E,TB, VU by DG at 11/6/2022 2023    Acceptance, E, VU,NR by HB at 11/4/2022 1153    Acceptance, E, VU,NR by HB at 11/3/2022 1428    Acceptance, E,TB, VU by DG at 11/1/2022 2016    Acceptance, E,TB, VU by DG at 10/31/2022 2315    Acceptance, E, VU,NR by BF at 10/31/2022 1429    Acceptance, E,TB, VU by DL at 10/31/2022 0101    Acceptance, E,TB, VU by DL at 10/29/2022 2321    Acceptance, E, VU,NR by HB at 10/29/2022 1137                   Point: Home exercise program (Done)     Description:   Instruct learner(s) on appropriate technique for monitoring, assisting and/or progressing therapeutic exercises/activities.              Learning Progress Summary           Patient Acceptance, E,TB,D, VU,DU,NR by LA at 11/11/2022 1248    Acceptance, E, VU,NR by BF at 11/10/2022 1431    Acceptance, E, VU,NR by HB at 11/9/2022 1230    Acceptance, E,TB, VU by DG at 11/9/2022 0050    Acceptance, E, VU,NR by CLARITZA at 11/8/2022 1426    Acceptance, E,TB, VU by BRYSON at  11/8/2022 0114    Acceptance, E, VU,NR by HB at 11/7/2022 1155    Acceptance, E,TB, VU by DG at 11/6/2022 2023    Acceptance, E, VU,NR by HB at 11/4/2022 1153    Acceptance, E, VU,NR by HB at 11/3/2022 1428    Acceptance, E,TB, VU by DG at 11/1/2022 2016    Acceptance, E,TB, VU by DG at 10/31/2022 2315    Acceptance, E,TB, VU by DL at 10/31/2022 0101    Acceptance, E,TB, VU by DL at 10/29/2022 2321    Acceptance, E, VU,NR by HB at 10/29/2022 1137                   Point: Precautions (Done)     Description:   Instruct learner(s) on prescribed precautions during self-care and functional transfers.              Learning Progress Summary           Patient Acceptance, E,TB,D, VU,DU,NR by LA at 11/11/2022 1248    Acceptance, E, VU,NR by HB at 11/9/2022 1230    Acceptance, E,TB, VU by DG at 11/9/2022 0050    Acceptance, E, VU,NR by HB at 11/8/2022 1426    Acceptance, E,TB, VU by DG at 11/8/2022 0114    Acceptance, E, VU,NR by HB at 11/7/2022 1155    Acceptance, E,TB, VU by DG at 11/6/2022 2023    Acceptance, E, VU,NR by HB at 11/4/2022 1153    Acceptance, E, VU,NR by HB at 11/3/2022 1428    Acceptance, E,TB, VU by DG at 11/1/2022 2016    Acceptance, E,TB, VU by DG at 10/31/2022 2315    Acceptance, E, VU,NR by BF at 10/31/2022 1429    Acceptance, E,TB, VU by DL at 10/31/2022 0101    Acceptance, E,TB, VU by DL at 10/29/2022 2321    Acceptance, E, VU,NR by HB at 10/29/2022 1137                   Point: Body mechanics (Done)     Description:   Instruct learner(s) on proper positioning and spine alignment during self-care, functional mobility activities and/or exercises.              Learning Progress Summary           Patient Acceptance, E,TB,D, VU,DU,NR by LA at 11/11/2022 1248    Acceptance, E, VU,NR by HB at 11/9/2022 1230    Acceptance, E,TB, VU by DG at 11/9/2022 0050    Acceptance, E, VU,NR by HB at 11/8/2022 1426    Acceptance, E,TB, VU by DG at 11/8/2022 0114    Acceptance, E, VU,NR by HB at 11/7/2022 1155     Acceptance, E,TB, VU by DG at 11/6/2022 2023    Acceptance, E, VU,NR by HB at 11/4/2022 1153    Acceptance, E, VU,NR by HB at 11/3/2022 1428    Acceptance, E,TB, VU by DG at 11/1/2022 2016    Acceptance, E,TB, VU by DG at 10/31/2022 2315    Acceptance, E,TB, VU by DL at 10/31/2022 0101    Acceptance, E,TB, VU by DL at 10/29/2022 2321    Acceptance, E, VU,NR by HB at 10/29/2022 1137                               User Key     Initials Effective Dates Name Provider Type Discipline    DG 06/16/21 -  Phyllis Yañez, RN Registered Nurse Nurse     06/16/21 -  Mandi Smith, OT Occupational Therapist OT    HB 05/25/21 -  Dorothy Rosas OT Occupational Therapist OT    LA 02/14/22 -  Jaqueline Bautista OT Occupational Therapist OT    DL 03/16/22 -  Hellen Mcdonnell, RN Registered Nurse Nurse                    OT Recommendation and Plan                         Time Calculation:      Time Calculation- OT     Row Name 11/11/22 1249             Time Calculation- OT    OT Start Time 0930  -LA      OT Stop Time 1015  -LA      OT Time Calculation (min) 45 min  -LA      Total Timed Code Minutes- OT 45 minute(s)  -LA            User Key  (r) = Recorded By, (t) = Taken By, (c) = Cosigned By    Initials Name Provider Type    LA Jaqueline Bautista OT Occupational Therapist              Therapy Charges for Today     Code Description Service Date Service Provider Modifiers Qty    83586213431 HC OT SELF CARE/MGMT/TRAIN EA 15 MIN 11/11/2022 Jaqueline Bautista OT GO 1    60552185550 HC OT THERAPEUTIC ACT EA 15 MIN 11/11/2022 Jaqueline Bautista OT GO 2                   Jaqueline Bautista OT  11/11/2022

## 2022-11-11 NOTE — THERAPY RE-EVALUATION
Inpatient Rehabilitation - Physical Therapy Re-Evaluation        [place]     Patient Name: Melchor Hardwick III  : 1965  MRN: [id number]    Today's Date: 2022                    Admit Date: 10/28/2022      Visit Dx:     ICD-10-CM ICD-9-CM   1. Staphylococcal arthritis of right knee (HCC)  M00.061 711.06     041.10       Patient Active Problem List   Diagnosis   • Hyperlipidemia   • Hypertensive disorder   • Obesity   • Type 2 diabetes mellitus with hyperglycemia, with long-term current use of insulin (HCC)   • Gas gangrene of foot (HCC)   • Cellulitis in diabetic foot (HCC)   • Other acute osteomyelitis of left foot (HCC)   • Osteomyelitis (HCC)   • Critical illness myopathy   • Staphylococcal arthritis of right knee (HCC)   • Other cirrhosis of liver (HCC)   • MRSA bacteremia   • Endocarditis of tricuspid valve   • Wound of right buttock   • History of osteomyelitis   • Debility       Past Medical History:   Diagnosis Date   • Diabetes mellitus (HCC)    • Hyperlipidemia    • Hypertension    • Pyogenic arthritis of right knee joint, due to unspecified organism (HCC) 2022       Past Surgical History:   Procedure Laterality Date   • ABSCESS DRAINAGE      PERINEUM   • AMPUTATION FOOT Left 2022    Procedure: AMPUTATION FOOT;  Surgeon: Addison Colby MD;  Location: [facility] OR;  Service: Podiatry;  Laterality: Left;   • INCISION AND DRAINAGE LEG Left 05/10/2022    Procedure: INCISION AND DRAINAGE LOWER EXTREMITY;  Surgeon: Addison Colby MD;  Location: [facility] OR;  Service: Podiatry;  Laterality: Left;   • KNEE INCISION AND DRAINAGE Right 2022    Procedure: KNEE INCISION AND DRAINAGE RIGHT;  Surgeon: Brain Bentley MD;  Location: [facility] OR;  Service: Orthopedics;  Laterality: Right;   • WOUND DEBRIDEMENT Left 2022    Procedure: DEBRIDEMENT FOOT;  Surgeon: Addison Colby MD;  Location: [facility] OR;  Service: Podiatry;  Laterality: Left;       PT ASSESSMENT (last 12 hours)     IRF PT  Evaluation and Treatment     Row Name 11/11/22 0900          PT Time and Intention    Document Type re-evaluation  -KM     Mode of Treatment individual therapy;physical therapy  -KM     Patient/Family/Caregiver Comments/Observations Pt. c/o LBP and R knee soreness  -     Row Name 11/11/22 0900          General Information    Patient Profile Reviewed yes  -KM     Existing Precautions/Restrictions fall;brace worn when out of bed  air cast LLE  -KM     Row Name 11/11/22 0900          Cognition/Psychosocial    Affect/Mental Status (Cognition) L  -KM     Follows Commands (Cognition) L  -     Personal Safety Interventions fall prevention program maintained;gait belt;nonskid shoes/slippers when out of bed;supervised activity  -KM     Cognitive Function WFL  -KM     Row Name 11/11/22 0900          Range of Motion (ROM)    Range of Motion right lower extremity;[facility]; ROM roughly 15-85 degrees  -KM     [facility] Name 11/11/22 0900          Strength (Manual Muscle Testing)    Strength (Manual Muscle Testing) bilateral lower extremities;strength is L  -[facility] Name 11/11/22 0900          Bed Mobility    Bed Mobility bed mobility (all) activities  -KM     All Activities, [place] (Bed Mobility) modified independence  -     Assistive Device (Bed Mobility) bed rails  -     Row Name 11/11/22 0900          Transfer Assessment/Treatment    Transfers bed-chair transfer;chair-bed transfer;sit-stand transfer;stand-sit transfer;car transfer  -[facility] Name 11/11/22 0900          Bed-Chair Transfer    Bed-Chair [place] (Transfers) contact guard;standby assist  -KM     Assistive Device (Bed-Chair Transfers) walker, front-wheeled  -KM     Row Name 11/11/22 0900          Chair-Bed Transfer    Chair-Bed [place] (Transfers) contact guard;standby assist  -KM     Assistive Device (Chair-Bed Transfers) wheelchair;walker, front-wheeled  -KM     Row Name 11/11/22 0900          Sit-Stand  Transfer    Sit-Stand [place] (Transfers) contact guard  -     Assistive Device (Sit-Stand Transfers) walker, front-wheeled  -KM     Row Name 11/11/22 0900          Stand-Sit Transfer    Stand-Sit [place] (Transfers) standby assist  -KM     Assistive Device (Stand-Sit Transfers) wheelchair;walker, front-wheeled  -KM     Row Name 11/11/22 0900          Car Transfer    Type (Car Transfer) stand pivot/stand step  -KM     [place] Level (Car Transfer) contact guard;verbal cues  -KM     Assistive Device (Car Transfer) wheelchair;walker, front-wheeled  -KM     Row Name 11/11/22 0900          Gait/Stairs (Locomotion)    Gait/Stairs Locomotion gait/ambulation independence;gait/ambulation assistive device;distance ambulated  -KM     [place] Level (Gait) contact guard  -     Assistive Device (Gait) walker, front-wheeled  -     Distance in Feet (Gait) 60' x2  -KM     Pattern (Gait) step-to;step-through  -KM     Bilateral Gait Deviations forward flexed posture  -KM     Right Sided Gait Deviations heel strike decreased;weight shift ability decreased  -KM     Comment, (Gait/Stairs) Pt. had to stop ambulation d/t increased R knee soreness. He has noticeable gait impairments d/t decreased ROM at R knee  -KM     Row Name 11/11/22 0900          Safety Issues, Functional Mobility    Impairments Affecting Function (Mobility) balance;endurance/activity tolerance;pain;range of motion (ROM);postural/trunk control  -KM     Row Name 11/11/22 0900          Balance    Balance Assessment sitting static balance;standing dynamic balance  -KM     Static Sitting Balance independent  -KM     Position, Sitting Balance sitting edge of bed;sitting edge of mat  -KM     Dynamic Standing Balance contact guard  -KM     Position/Device Used, Standing Balance walker, front-wheeled  -KM     Row Name 11/11/22 0900          Motor Skills    Therapeutic Exercise hip;knee;ankle  -KM     Row Name 11/11/22 0900          Hip (Therapeutic  Exercise)    Hip (Therapeutic Exercise) strengthening exercise  -KM     Hip Strengthening (Therapeutic Exercise) bilateral;flexion  -KM     Row Name 11/11/22 0900          Knee (Therapeutic Exercise)    Knee (Therapeutic Exercise) strengthening exercise;PROM (passive range of motion)  -KM     Knee PROM (Therapeutic Exercise) bilateral;flexion;extension;prone  -KM     Knee Strengthening (Therapeutic Exercise) bilateral;flexion;extension  -KM     Row Name 11/11/22 0900          Ankle (Therapeutic Exercise)    Ankle (Therapeutic Exercise) strengthening exercise  -KM     Ankle Strengthening (Therapeutic Exercise) bilateral;dorsiflexion;plantarflexion  -KM     Row Name 11/11/22 0900          Aerobic Exercise    Type (Aerobic Exercise) recumbent stationary bike  -KM     Time Performed (Aerobic Exercise) 10'  -KM     Comment, Aerobic Exercise (Therapeutic Exercise) Unable to complete full revolution d/t decreased R knee ROM  -     Row Name 11/11/22 0900          Positioning and Restraints    Pre-Treatment Position in bed  -KM     Post Treatment Position bed  -KM     In Bed supine;call light within reach;encouraged to call for assist  -     Row Name 11/11/22 0900          Daily Progress Summary (PT)    Functional Goal Overall Progress (PT) progressing toward functional goals as expected  -KM     Daily Progress Summary (PT) Pt. re-evaluation completed during PT session. He was able to ambulate short distances w/ RW and CGA. He was able to perform functional mobility skills w/ MI-CGA. Pt. has increased difficulty performing mobility tasks d/t R knee limited ROM and pain. Pt. would continue to benefit from skilled PT services.  -KM     Row Name 11/11/22 0900          IRF PT Goals    Bed Mobility Goal Selection (PT-IRF) bed mobility, PT goal 1  -KM     Transfer Goal Selection (PT-IRF) transfers, PT goal 1  -KM     Gait (Walking Locomotion) Goal Selection (PT-IRF) gait, PT goal 1  -KM     Row Name 11/11/22 0900          Bed  Mobility Goal 1 (PT-IRF)    Activity/Assistive Device (Bed Mobility Goal 1, PT-IRF) bed mobility activities, all  -KM     [place] Level (Bed Mobility Goal 1, PT-IRF) independent  -KM     Time Frame (Bed Mobility Goal 1, PT-IRF) long-term goal (LTG)  -KM     Progress/Outcomes (Bed Mobility Goal 1, PT-IRF) goal met  -KM     Row Name 11/11/22 0900          Transfer Goal 1 (PT-IRF)    Activity/Assistive Device (Transfer Goal 1, PT-IRF) sit-to-stand/stand-to-sit;bed-to-chair/chair-to-bed;walker, rolling  -KM     [place] Level (Transfer Goal 1, PT-IRF) modified independence  -KM     Time Frame (Transfer Goal 1, PT-IRF) long-term goal (LTG)  -KM     Progress/Outcomes (Transfer Goal 1, PT-IRF) new goal  -KM     Row Name 11/11/22 0900          Gait/Walking Locomotion Goal 1 (PT-IRF)    Activity/Assistive Device (Gait/Walking Locomotion Goal 1, PT-IRF) gait (walking locomotion);assistive device use  -KM     Gait/Walking Locomotion Distance Goal 1 (PT-IRF) 100'  -KM     [place] Level (Gait/Walking Locomotion Goal 1, PT-IRF) modified independence  -KM     Time Frame (Gait/Walking Locomotion Goal 1, PT-IRF) long-term goal (LTG)  -KM     Progress/Outcomes (Gait/Walking Locomotion Goal 1, PT-IRF) goal revised this date  -KM           User Key  (r) = Recorded By, (t) = Taken By, (c) = Cosigned By    Initials Name Provider Type    Forest Lawson, PT Physical Therapist              Wound 09/21/22 1532 Right anterior knee Incision (Active)   Dressing Appearance open to air 11/10/22 1920   Closure Approximated 11/10/22 1920   Base dry;clean 11/10/22 1920       Wound 10/28/22 1827 Right coccyx Pressure Injury (Active)   Closure Open to air 11/10/22 1920   Base red;non-blanchable 11/10/22 1920   Care, Wound barrier applied;pressure removed 11/10/22 1920     Physical Therapy Education     Title: PT OT SLP Therapies (Done)     Topic: Physical Therapy (Done)     Point: Mobility training (Done)     Learning Progress Summary            Patient Acceptance, E,TB, VU by RF at 11/10/2022 1551    Acceptance, E,TB, VU by RF at 11/9/2022 1622    Acceptance, E,TB, VU by DG at 11/9/2022 0050    Acceptance, E,TB, VU by RF at 11/8/2022 1539    Acceptance, E,TB, VU by DG at 11/8/2022 0114    Acceptance, E,TB, VU by RF at 11/7/2022 1607    Acceptance, E,TB, VU by DG at 11/6/2022 2023    Acceptance, E,TB, VU by RF at 11/4/2022 1551    Acceptance, E,TB, VU,DU by HR at 11/3/2022 1541    Acceptance, E,D, VU,NR by RG at 11/3/2022 1442    Acceptance, E,TB, VU by DG at 11/1/2022 2016    Acceptance, E,D, VU,NR by RG at 11/1/2022 1541    Acceptance, E,TB, VU by DG at 10/31/2022 2315    Acceptance, E,D, VU,NR by RG at 10/31/2022 1446                   Point: Home exercise program (Done)     Learning Progress Summary           Patient Acceptance, E,TB, VU by RF at 11/10/2022 1551    Acceptance, E,TB, VU by RF at 11/9/2022 1622    Acceptance, E,TB, VU by DG at 11/9/2022 0050    Acceptance, E,TB, VU by RF at 11/8/2022 1539    Acceptance, E,TB, VU by DG at 11/8/2022 0114    Acceptance, E,TB, VU by RF at 11/7/2022 1607    Acceptance, E,TB, VU by DG at 11/6/2022 2023    Acceptance, E,TB, VU by RF at 11/4/2022 1551    Acceptance, E,TB, VU,DU by HR at 11/3/2022 1541    Acceptance, E,D, VU,NR by RG at 11/3/2022 1442    Acceptance, E,TB, VU by DG at 11/1/2022 2016    Acceptance, E,D, VU,NR by RG at 11/1/2022 1541    Acceptance, E,TB, VU by DG at 10/31/2022 2315    Acceptance, E,D, VU,NR by RG at 10/31/2022 1446                   Point: Body mechanics (Done)     Learning Progress Summary           Patient Acceptance, E,TB, VU by RF at 11/10/2022 1551    Acceptance, E,TB, VU by RF at 11/9/2022 1622    Acceptance, E,TB, VU by DG at 11/9/2022 0050    Acceptance, E,TB, VU by RF at 11/8/2022 1539    Acceptance, E,TB, VU by DG at 11/8/2022 0114    Acceptance, E,TB, VU by RF at 11/7/2022 1607    Acceptance, E,TB, VU by DG at 11/6/2022 2023    Acceptance, E,TB, VU by RF at  11/4/2022 1551    Acceptance, E,TB, VU,DU by HR at 11/3/2022 1541    Acceptance, E,D, VU,NR by RG at 11/3/2022 1442    Acceptance, E,TB, VU by DG at 11/1/2022 2016    Acceptance, E,D, VU,NR by RG at 11/1/2022 1541    Acceptance, E,TB, VU by DG at 10/31/2022 2315    Acceptance, E,D, VU,NR by RG at 10/31/2022 1446                   Point: Precautions (Done)     Learning Progress Summary           Patient Acceptance, E,TB, VU by  at 11/10/2022 1551    Acceptance, E,TB, VU by RF at 11/9/2022 1622    Acceptance, E,TB, VU by DG at 11/9/2022 0050    Acceptance, E,TB, VU by RF at 11/8/2022 1539    Acceptance, E,TB, VU by DG at 11/8/2022 0114    Acceptance, E,TB, VU by  at 11/7/2022 1607    Acceptance, E,TB, VU by DG at 11/6/2022 2023    Acceptance, E,TB, VU by RF at 11/4/2022 1551    Acceptance, E,TB, VU,DU by HR at 11/3/2022 1541    Acceptance, E,D, VU,NR by RG at 11/3/2022 1442    Acceptance, E,TB, VU by DG at 11/1/2022 2016    Acceptance, E,D, VU,NR by RG at 11/1/2022 1541    Acceptance, E,TB, VU by DG at 10/31/2022 2315    Acceptance, E,D, VU,NR by RG at 10/31/2022 1446                               User Key     Initials Effective Dates Name Provider Type Discipline     06/16/21 -  Phyllis Yañez RN Registered Nurse Nurse     06/16/21 -  Meron Lei PTA Physical Therapist Assistant PT    RG 06/16/21 -  Michi Mckeon PTA Physical Therapist Assistant PT    HR 01/14/22 -  Hanna Murphy PTA Physical Therapist Assistant PT                PT Recommendation and Plan          Daily Progress Summary (PT)  Functional Goal Overall Progress (PT): progressing toward functional goals as expected  Daily Progress Summary (PT): Pt. re-evaluation completed during PT session. He was able to ambulate short distances w/ RW and CGA. He was able to perform functional mobility skills w/ MI-CGA. Pt. has increased difficulty performing mobility tasks d/t R knee limited ROM and pain. Pt. would continue to benefit from skilled PT  services.               Time Calculation:      PT Charges     Row Name 11/11/22 0943             Time Calculation    Start Time 0745  -KM      Stop Time 0915  -KM      Time Calculation (min) 90 min  -KM      PT Received On 11/11/22  -KM      PT Goal Re-Cert Due Date 11/25/22  -KM         Time Calculation- PT    Total Timed Code Minutes- PT 90 minute(s)  -KM            User Key  (r) = Recorded By, (t) = Taken By, (c) = Cosigned By    Initials Name Provider Type     Forest Zepeda, PT Physical Therapist                Therapy Charges for Today     Code Description Service Date Service Provider Modifiers Qty    [id number] HC GAIT TRAINING EA 15 MIN 11/11/2022 Forest Zepeda, PT GP 1    [id number]  PT THER PROC EA 15 MIN 11/11/2022 Forest Zepeda, PT GP 2    [id number]  PT THERAPEUTIC ACT EA 15 MIN 11/11/2022 Forest Zepeda, PT GP 2    [id number]  PT MANUAL THERAPY EA 15 MIN 11/11/2022 Forest Zepeda, PT GP 1                   Forest Zepeda PT  11/11/2022

## 2022-11-12 ENCOUNTER — APPOINTMENT (OUTPATIENT)
Dept: ULTRASOUND IMAGING | Facility: HOSPITAL | Age: 57
End: 2022-11-12

## 2022-11-12 LAB
ANION GAP SERPL CALCULATED.3IONS-SCNC: 10.2 MMOL/L (ref 5–15)
BASOPHILS # BLD AUTO: 0.01 10*3/MM3 (ref 0–0.2)
BASOPHILS NFR BLD AUTO: 0.3 % (ref 0–1.5)
BUN SERPL-MCNC: 10 MG/DL (ref 6–20)
BUN/CREAT SERPL: 13.7 (ref 7–25)
CALCIUM SPEC-SCNC: 8.2 MG/DL (ref 8.6–10.5)
CHLORIDE SERPL-SCNC: 101 MMOL/L (ref 98–107)
CO2 SERPL-SCNC: 24.8 MMOL/L (ref 22–29)
CREAT SERPL-MCNC: 0.73 MG/DL (ref 0.76–1.27)
CRP SERPL-MCNC: 9.69 MG/DL (ref 0–0.5)
DEPRECATED RDW RBC AUTO: 55.4 FL (ref 37–54)
EGFRCR SERPLBLD CKD-EPI 2021: 106.1 ML/MIN/1.73
EOSINOPHIL # BLD AUTO: 0.05 10*3/MM3 (ref 0–0.4)
EOSINOPHIL NFR BLD AUTO: 1.5 % (ref 0.3–6.2)
ERYTHROCYTE [DISTWIDTH] IN BLOOD BY AUTOMATED COUNT: 17.9 % (ref 12.3–15.4)
GLUCOSE BLDC GLUCOMTR-MCNC: 162 MG/DL (ref 70–130)
GLUCOSE BLDC GLUCOMTR-MCNC: 247 MG/DL (ref 70–130)
GLUCOSE BLDC GLUCOMTR-MCNC: 72 MG/DL (ref 70–130)
GLUCOSE SERPL-MCNC: 118 MG/DL (ref 65–99)
HCT VFR BLD AUTO: 28.5 % (ref 37.5–51)
HGB BLD-MCNC: 8.6 G/DL (ref 13–17.7)
IMM GRANULOCYTES # BLD AUTO: 0.01 10*3/MM3 (ref 0–0.05)
IMM GRANULOCYTES NFR BLD AUTO: 0.3 % (ref 0–0.5)
LYMPHOCYTES # BLD AUTO: 0.91 10*3/MM3 (ref 0.7–3.1)
LYMPHOCYTES NFR BLD AUTO: 27 % (ref 19.6–45.3)
MCH RBC QN AUTO: 25.7 PG (ref 26.6–33)
MCHC RBC AUTO-ENTMCNC: 30.2 G/DL (ref 31.5–35.7)
MCV RBC AUTO: 85.1 FL (ref 79–97)
MONOCYTES # BLD AUTO: 0.27 10*3/MM3 (ref 0.1–0.9)
MONOCYTES NFR BLD AUTO: 8 % (ref 5–12)
NEUTROPHILS NFR BLD AUTO: 2.12 10*3/MM3 (ref 1.7–7)
NEUTROPHILS NFR BLD AUTO: 62.9 % (ref 42.7–76)
NRBC BLD AUTO-RTO: 0 /100 WBC (ref 0–0.2)
PLATELET # BLD AUTO: 91 10*3/MM3 (ref 140–450)
PMV BLD AUTO: 9.5 FL (ref 6–12)
POTASSIUM SERPL-SCNC: 4 MMOL/L (ref 3.5–5.2)
RBC # BLD AUTO: 3.35 10*6/MM3 (ref 4.14–5.8)
SODIUM SERPL-SCNC: 136 MMOL/L (ref 136–145)
WBC NRBC COR # BLD: 3.37 10*3/MM3 (ref 3.4–10.8)

## 2022-11-12 PROCEDURE — 80048 BASIC METABOLIC PNL TOTAL CA: CPT | Performed by: INTERNAL MEDICINE

## 2022-11-12 PROCEDURE — 86140 C-REACTIVE PROTEIN: CPT | Performed by: NURSE PRACTITIONER

## 2022-11-12 PROCEDURE — 25010000002 FONDAPARINUX PER 0.5 MG: Performed by: FAMILY MEDICINE

## 2022-11-12 PROCEDURE — 93971 EXTREMITY STUDY: CPT

## 2022-11-12 PROCEDURE — 87040 BLOOD CULTURE FOR BACTERIA: CPT | Performed by: NURSE PRACTITIONER

## 2022-11-12 PROCEDURE — 99233 SBSQ HOSP IP/OBS HIGH 50: CPT | Performed by: NURSE PRACTITIONER

## 2022-11-12 PROCEDURE — 63710000001 INSULIN ASPART PER 5 UNITS: Performed by: INTERNAL MEDICINE

## 2022-11-12 PROCEDURE — 63710000001 INSULIN DETEMIR PER 5 UNITS: Performed by: INTERNAL MEDICINE

## 2022-11-12 PROCEDURE — 99231 SBSQ HOSP IP/OBS SF/LOW 25: CPT | Performed by: INTERNAL MEDICINE

## 2022-11-12 PROCEDURE — 85025 COMPLETE CBC W/AUTO DIFF WBC: CPT | Performed by: INTERNAL MEDICINE

## 2022-11-12 PROCEDURE — 82962 GLUCOSE BLOOD TEST: CPT

## 2022-11-12 PROCEDURE — 25010000002 DAPTOMYCIN PER 1 MG: Performed by: INTERNAL MEDICINE

## 2022-11-12 PROCEDURE — 93970 EXTREMITY STUDY: CPT

## 2022-11-12 RX ADMIN — CARBIDOPA AND LEVODOPA 10 MG: 50; 200 TABLET, EXTENDED RELEASE ORAL at 16:54

## 2022-11-12 RX ADMIN — PREGABALIN 100 MG: 100 CAPSULE ORAL at 08:35

## 2022-11-12 RX ADMIN — CARBIDOPA AND LEVODOPA 10 MG: 50; 200 TABLET, EXTENDED RELEASE ORAL at 12:02

## 2022-11-12 RX ADMIN — OXYCODONE HYDROCHLORIDE AND ACETAMINOPHEN 500 MG: 500 TABLET ORAL at 08:34

## 2022-11-12 RX ADMIN — FONDAPARINUX SODIUM 2.5 MG: 2.5 INJECTION, SOLUTION SUBCUTANEOUS at 08:35

## 2022-11-12 RX ADMIN — POLYETHYLENE GLYCOL (3350) 17 G: 17 POWDER, FOR SOLUTION ORAL at 08:35

## 2022-11-12 RX ADMIN — Medication 1 TABLET: at 08:35

## 2022-11-12 RX ADMIN — INSULIN ASPART 3 UNITS: 100 INJECTION, SOLUTION INTRAVENOUS; SUBCUTANEOUS at 16:54

## 2022-11-12 RX ADMIN — DOCUSATE SODIUM 100 MG: 100 CAPSULE ORAL at 20:58

## 2022-11-12 RX ADMIN — INSULIN DETEMIR 23 UNITS: 100 INJECTION, SOLUTION SUBCUTANEOUS at 09:50

## 2022-11-12 RX ADMIN — PREGABALIN 100 MG: 100 CAPSULE ORAL at 20:58

## 2022-11-12 RX ADMIN — INSULIN ASPART 5 UNITS: 100 INJECTION, SOLUTION INTRAVENOUS; SUBCUTANEOUS at 12:02

## 2022-11-12 RX ADMIN — Medication 150 MG: at 08:34

## 2022-11-12 RX ADMIN — FLUDROCORTISONE ACETATE 50 MCG: 0.1 TABLET ORAL at 08:34

## 2022-11-12 RX ADMIN — CARBIDOPA AND LEVODOPA 10 MG: 50; 200 TABLET, EXTENDED RELEASE ORAL at 07:01

## 2022-11-12 RX ADMIN — HYDROCODONE BITARTRATE AND ACETAMINOPHEN 1 TABLET: 5; 325 TABLET ORAL at 08:35

## 2022-11-12 RX ADMIN — DOCUSATE SODIUM 100 MG: 100 CAPSULE ORAL at 08:34

## 2022-11-12 RX ADMIN — INSULIN DETEMIR 18 UNITS: 100 INJECTION, SOLUTION SUBCUTANEOUS at 21:18

## 2022-11-12 RX ADMIN — LEVOTHYROXINE SODIUM 25 MCG: 0.07 TABLET ORAL at 05:52

## 2022-11-12 RX ADMIN — DAPTOMYCIN 500 MG: 500 INJECTION, POWDER, LYOPHILIZED, FOR SOLUTION INTRAVENOUS at 18:02

## 2022-11-12 RX ADMIN — PANTOPRAZOLE SODIUM 40 MG: 40 TABLET, DELAYED RELEASE ORAL at 05:52

## 2022-11-12 RX ADMIN — ASPIRIN 81 MG: 81 TABLET, COATED ORAL at 08:34

## 2022-11-12 NOTE — PROGRESS NOTES
PROGRESS NOTE         Patient Identification:  Name:  Melchor Hardwick III  Age:  57 y.o.  Sex:  male  :  1965  MRN:  [id number]  Visit Number:  [id number]  Primary Care Provider:  Missy Up APRN         LOS: 15 days       ----------------------------------------------------------------------------------------------------------------------  Subjective       Chief Complaints:    No chief complaint on file.        Interval History:      Patient resting comfortably in bed this morning.  Low-grade fever overnight with T-max of 99.1.  WBC 3.37 this morning.  CRP worsening at 9.69.  Right knee with no signs of infection, mild edema and stiffness but no drainage, erythema or warmth.  Midline to the left arm with no signs of infection.    Review of Systems:    Constitutional: no fever, chills and night sweats.  Generalized fatigue.  Eyes: no eye drainage, itching or redness.  HEENT: no mouth sores, dysphagia or nose bleed.  Respiratory: no for shortness of breath, cough or production of sputum.  Cardiovascular: no chest pain, no palpitations, no orthopnea.  Gastrointestinal: no nausea, vomiting or diarrhea. No abdominal pain, hematemesis or rectal bleeding.  Genitourinary: no dysuria or polyuria.  Hematologic/lymphatic: no lymph node abnormalities, no easy bruising or easy bleeding.  Musculoskeletal: no muscle or joint pain.  Stiff right knee.  Skin: No rash and no itching.  Neurological: no loss of consciousness, no seizure, no headache.  Behavioral/Psych: no depression or suicidal ideation.  Endocrine: no hot flashes.  Immunologic: negative.    ----------------------------------------------------------------------------------------------------------------------      Objective       [facility] Meds:  ascorbic acid, 500 mg, Oral, Daily  aspirin, 81 mg, Oral, Daily  DAPTOmycin, 6 mg/kg (Adjusted), Intravenous, Q24H  docusate sodium, 100 mg, Oral, BID  fludrocortisone, 50 mcg, Oral,  Daily  fondaparinux, 2.5 mg, Subcutaneous, Q24H  Insulin Aspart, 0-14 Units, Subcutaneous, TID AC  insulin detemir, 23 Units, Subcutaneous, Q12H  iron polysaccharides, 150 mg, Oral, Daily  levothyroxine, 25 mcg, Oral, Q AM  midodrine, 10 mg, Oral, TID AC  multivitamin with minerals, 1 tablet, Oral, Daily  pantoprazole, 40 mg, Oral, Q AM  polyethylene glycol, 17 g, Oral, Daily  pregabalin, 100 mg, Oral, Q12H      Pharmacy Consult,       ----------------------------------------------------------------------------------------------------------------------    Vital Signs:  Temp:  [98.8 °F (37.1 °C)-99.1 °F (37.3 °C)] 98.8 °F (37.1 °C)  Heart Rate:  [82-85] 82  Resp:  [16-18] 16  BP: (116-138)/(55-82) 138/68  No data found.  SpO2 Percentage    11/11/22 0745 11/11/22 1900 11/12/22 0749   SpO2: 98% 99% 97%     SpO2:  [97 %-99 %] 97 %  on   ;   Device (Oxygen Therapy): room air    Body mass index is 28.34 kg/m².  Wt Readings from Last 3 Encounters:   11/01/22 89.6 kg (197 lb 8.5 oz)   09/23/22 98 kg (216 lb)   06/17/22 115 kg (254 lb)        Intake/Output Summary (Last 24 hours) at 11/12/2022 0932  Last data filed at 11/12/2022 0800  Gross per 24 hour   Intake 1320 ml   Output 2900 ml   Net -1580 ml     Diet Regular; Thin; Consistent Carbohydrate  ----------------------------------------------------------------------------------------------------------------------      Physical Exam:    Constitutional: Chronically ill-appearing male resting comfortably in bed with no apparent distress.  HENT:  Head: Normocephalic and atraumatic.  Mouth:  Moist mucous membranes.    Eyes:  Conjunctivae and EOM are normal.  No scleral icterus.  Neck:  Neck supple.  No JVD present.    Cardiovascular:  Normal rate, regular rhythm and normal heart sounds with no murmur. No edema.  Pulmonary/Chest:  No respiratory distress, no wheezes, no crackles, with normal breath sounds and good air movement.  Abdominal:  Soft.  Bowel sounds are normal.  No  distension and no tenderness.   Musculoskeletal:  No edema, no tenderness, and no deformity.  No swelling or redness of joints.  Neurological:  Alert and oriented to person, place, and time.  No facial droop.  No slurred speech.   Skin:  Skin is warm and dry.  No rash noted.  No pallor.  Right knee surgical incision with good healing and staples removed.  Mild edema noted to right knee with no erythema or drainage noted.  Psychiatric:  Normal mood and affect.  Behavior is normal.    ----------------------------------------------------------------------------------------------------------------------  Results from last 7 days   Lab Units 11/06/22  0032   CK TOTAL U/L 29           Results from last 7 days   Lab Units 11/12/22  0139 11/10/22  0102 11/08/22  0135   CRP mg/dL 9.69*  --   --    WBC 10*3/mm3 3.37* 3.01* 3.10*   HEMOGLOBIN g/dL 8.6* 9.0* 8.8*   HEMATOCRIT % 28.5* 29.0* 28.5*   MCV fL 85.1 83.8 85.3   MCHC g/dL 30.2* 31.0* 30.9*   PLATELETS 10*3/mm3 91* 97* 110*     Results from last 7 days   Lab Units 11/12/22  0139 11/10/22  0102 11/09/22  0144   SODIUM mmol/L 136 137 134*   POTASSIUM mmol/L 4.0 3.9 4.2   CHLORIDE mmol/L 101 103 101   CO2 mmol/L 24.8 23.7 26.2   BUN mg/dL 10 11 12   CREATININE mg/dL 0.73* 0.62* 0.82   CALCIUM mg/dL 8.2* 8.4* 8.4*   GLUCOSE mg/dL 118* 110* 181*   Estimated Creatinine Clearance: 125.7 mL/min (A) (by C-G formula based on SCr of 0.73 mg/dL (L)).  No results found for: AMMONIA    Glucose   Date/Time Value Ref Range Status   11/12/2022 0615 72 70 - 130 mg/dL Final     Comment:     Meter: [id number] : [id number] Marcos Crystal   11/11/2022 1555 290 (H) 70 - 130 mg/dL Final     Comment:     Meter: [id number] : [id number] Christy Berriosyla   11/11/2022 1142 215 (H) 70 - 130 mg/dL Final     Comment:     Meter: [id number] : [id number] Negrete Eunice   11/11/2022 0555 128 70 - 130 mg/dL Final     Comment:     Meter: [id number] : [id number] Susi Velez   11/10/2022 1553  253 (H) 70 - 130 mg/dL Final     Comment:     Meter: [id number] : [id number] SAEZ JUAN CARLOS   11/10/2022 1054 250 (H) 70 - 130 mg/dL Final     Comment:     Meter: [id number] : [id number] SAEZ JUAN CARLOS   11/10/2022 0546 76 70 - 130 mg/dL Final     Comment:     Meter: [id number] : [id number] Susi Velez   11/09/2022 1559 186 (H) 70 - 130 mg/dL Final     Comment:     Meter: [id number] : [id number] SAEZBONNIE AVILESA     Lab Results   Component Value Date    HGBA1C 8.80 (H) 09/20/2022     Lab Results   Component Value Date    TSH 2.390 10/10/2022    FREET4 0.87 (L) 10/10/2022       Blood Culture   Date Value Ref Range Status   10/24/2022 No growth at 5 days  Final   10/24/2022 No growth at 5 days  Final     No results found for: URINECX  No results found for: WOUNDCX  No results found for: STOOLCX  No results found for: RESPCX  Pain Management Panel     Pain Management Panel Latest Ref Rng & Units 5/24/2022 5/11/2022    CREATININE UR mg/dL 91.0 -    AMPHETAMINES SCREEN, URINE Negative - Negative    BARBITURATES SCREEN Negative - Negative    BENZODIAZEPINE SCREEN, URINE Negative - Negative    BUPRENORPHINEUR Negative - Negative    COCAINE SCREEN, URINE Negative - Negative    METHADONE SCREEN, URINE Negative - Negative    METHAMPHETAMINEUR Negative - Negative            ----------------------------------------------------------------------------------------------------------------------  Imaging Results (Last 24 Hours)     ** No results found for the last 24 hours. **          ----------------------------------------------------------------------------------------------------------------------    Pertinent Infectious Disease Results    CPK on 11/6/2022 remains normal at 29.  COVID-19 PCR from 10/28/2022 negative.  Blood cultures from 10/18/2022 2 out of 2 sets positive for MRSA.  Blood cultures from 10/20/2022 2 out of 2 sets positive for MRSA.  Blood cultures from 10/22/2022 1 out of 2 sets positive for  MRSA.  Blood cultures from 10/24/2022 finalized as no growth.  2D echo from 10/24/2022 reports no evidence of vegetation.  Updated TYRESE on 11/1/2022 showed a moderate sized echodense structure on the posterior tricuspid leaflet but not attached to the leaflet.  The appearance and location are not typical for regurgitation.  This structure could be a normal variant.  No evidence of tricuspid valve stenosis or significant regurgitations.  Other valves also appear normal in structure with no evidence of stenosis or significant regurgitations.  No vegetation seen on these valves.      Assessment/Plan       Assessment     Right knee septic arthritis  MRSA bacteremia  Possible endocarditis    Plan      Patient resting comfortably in bed this morning.  Low-grade fever overnight with T-max of 99.1.  WBC 3.37 this morning.  CRP worsening at 9.69.  Right knee with no signs of infection, mild edema and stiffness but no drainage, erythema or warmth.  Midline to the left arm with no signs of infection.    Blood cultures x2 ordered for today in the setting of persistent low-grade fever and worsening CRP.  Bilateral upper extremity venous duplex ordered to rule out DVT.  For now we will continue daptomycin through 11/20/2022 with follow-up TYRESE upon completion of antibiotic therapy.      Code Status:   Code Status and Medical Interventions:   Ordered at: 11/03/22 1015     Level Of Support Discussed With:    Patient     Code Status (Patient has no pulse and is not breathing):    CPR (Attempt to Resuscitate)     Medical Interventions (Patient has pulse or is breathing):    Full Support     Release to patient:    Routine Release       ROSALINA Caballero  11/12/22  09:32 EST     Electronically signed by ROSALINA Caballero, 11/12/22, 9:35 AM EST.

## 2022-11-12 NOTE — PLAN OF CARE
Goal Outcome Evaluation:  Plan of Care Reviewed With: patient        Progress: improving  Outcome Evaluation: Patient making good progress with therapies. Continue with plan of care.

## 2022-11-12 NOTE — PROGRESS NOTES
CC: Follow-up on General debility from prolonged hospitalization    Interview History/HPI: Patient without complaints, no significant pain, no shortness of breath, tolerating his diet.  He has not been getting dizzy when he gets up with therapy.          Current Hospital Meds:  ascorbic acid, 500 mg, Oral, Daily  aspirin, 81 mg, Oral, Daily  DAPTOmycin, 6 mg/kg (Adjusted), Intravenous, Q24H  docusate sodium, 100 mg, Oral, BID  fludrocortisone, 50 mcg, Oral, Daily  fondaparinux, 2.5 mg, Subcutaneous, Q24H  Insulin Aspart, 0-14 Units, Subcutaneous, TID AC  insulin detemir, 23 Units, Subcutaneous, Q12H  iron polysaccharides, 150 mg, Oral, Daily  levothyroxine, 25 mcg, Oral, Q AM  midodrine, 10 mg, Oral, TID AC  multivitamin with minerals, 1 tablet, Oral, Daily  pantoprazole, 40 mg, Oral, Q AM  polyethylene glycol, 17 g, Oral, Daily  pregabalin, 100 mg, Oral, Q12H    Pharmacy Consult,         Vitals:    11/12/22 0749   BP: 138/68   Pulse: 82   Resp: 16   Temp: 98.8 °F (37.1 °C)   SpO2: 97%         Intake/Output Summary (Last 24 hours) at 11/12/2022 1048  Last data filed at 11/12/2022 0800  Gross per 24 hour   Intake 1320 ml   Output 2900 ml   Net -1580 ml       EXAM: No edema is noted, his right knee incision is well-healed, status post transmetatarsal amputation of the left foot.  He wears a boot to ambulate.  Mood is good no distress.      Diet Regular; Thin; Consistent Carbohydrate        LABS:     Lab Results (last 48 hours)     Procedure Component Value Units Date/Time    POC Glucose Once [[id number]]  (Normal) Collected: 11/12/22 0615    Specimen: Blood Updated: 11/12/22 0621     Glucose 72 mg/dL      Comment: Meter: [id number] : [id number] Marcos Crystal       Basic Metabolic Panel [[id number]]  (Abnormal) Collected: 11/12/22 0139    Specimen: Blood Updated: 11/12/22 0218     Glucose 118 mg/dL      BUN 10 mg/dL      Creatinine 0.73 mg/dL      Sodium 136 mmol/L      Potassium 4.0 mmol/L      Chloride 101  mmol/L      CO2 24.8 mmol/L      Calcium 8.2 mg/dL      BUN/Creatinine Ratio 13.7     Anion Gap 10.2 mmol/L      eGFR 106.1 mL/min/1.73      Comment: National Kidney Foundation and American Society of Nephrology (ASN) Task Force recommended calculation based on the Chronic Kidney Disease Epidemiology Collaboration (CKD-EPI) equation refit without adjustment for race.       Narrative:      GFR Normal >60  Chronic Kidney Disease <60  Kidney Failure <15      C-reactive Protein [[id number]]  (Abnormal) Collected: 11/12/22 0139    Specimen: Blood Updated: 11/12/22 0218     C-Reactive Protein 9.69 mg/dL     CBC & Differential [[id number]]  (Abnormal) Collected: 11/12/22 0139    Specimen: Blood Updated: 11/12/22 0156    Narrative:      The following orders were created for panel order CBC & Differential.  Procedure                               Abnormality         Status                     ---------                               -----------         ------                     CBC Auto Differential[[id number]]        Abnormal            Final result                 Please view results for these tests on the individual orders.    CBC Auto Differential [[id number]]  (Abnormal) Collected: 11/12/22 0139    Specimen: Blood Updated: 11/12/22 0156     WBC 3.37 10*3/mm3      RBC 3.35 10*6/mm3      Hemoglobin 8.6 g/dL      Hematocrit 28.5 %      MCV 85.1 fL      MCH 25.7 pg      MCHC 30.2 g/dL      RDW 17.9 %      RDW-SD 55.4 fl      MPV 9.5 fL      Platelets 91 10*3/mm3      Neutrophil % 62.9 %      Lymphocyte % 27.0 %      Monocyte % 8.0 %      Eosinophil % 1.5 %      Basophil % 0.3 %      Immature Grans % 0.3 %      Neutrophils, Absolute 2.12 10*3/mm3      Lymphocytes, Absolute 0.91 10*3/mm3      Monocytes, Absolute 0.27 10*3/mm3      Eosinophils, Absolute 0.05 10*3/mm3      Basophils, Absolute 0.01 10*3/mm3      Immature Grans, Absolute 0.01 10*3/mm3      nRBC 0.0 /100 WBC     POC Glucose Once [[id number]]  (Abnormal) Collected:  11/11/22 1555    Specimen: Blood Updated: 11/11/22 1607     Glucose 290 mg/dL      Comment: Meter: [id number] : [id number] Christy Grant       POC Glucose Once [[id number]]  (Abnormal) Collected: 11/11/22 1142    Specimen: Blood Updated: 11/11/22 1202     Glucose 215 mg/dL      Comment: Meter: [id number] : [id number] Penelope Dawson       POC Glucose Once [[id number]]  (Normal) Collected: 11/11/22 0555    Specimen: Blood Updated: 11/11/22 0608     Glucose 128 mg/dL      Comment: Meter: [id number] : [id number] Susi Dangda       POC Glucose Once [[id number]]  (Abnormal) Collected: 11/10/22 1553    Specimen: Blood Updated: 11/10/22 1558     Glucose 253 mg/dL      Comment: Meter: [id number] : [id number] SAEZ JUAN CARLOS       POC Glucose Once [[id number]]  (Abnormal) Collected: 11/10/22 1054    Specimen: Blood Updated: 11/10/22 1100     Glucose 250 mg/dL      Comment: Meter: [id number] : [id number] SAEZ JUAN CARLOS                  Radiology:    Imaging Results (Last 72 Hours)     Procedure Component Value Units Date/Time    US Venous Doppler Upper Extremity Bilateral (duplex) [[id number]] Resulted: 11/12/22 1035     Updated: 11/12/22 1035          Results for orders placed during the hospital encounter of 10/28/22    Adult Transesophageal Echo (TYRESE) W/ Cont if Necessary Per Protocol    Interpretation Summary  •  Indication for TYRESE -to rule out infective endocarditis in a patient with MRSA bacteremia.  •  Findings-  •  Left ventricular systolic function is normal. Estimated left ventricular EF = 60%  •  Left atrial appendage is well visualized and is free of thrombus.  •  Saline test was negative for the evidence of shunt in interatrial septum.  •  The tricuspid valve appeared normal in structure. There was a moderate sized echodense structure seen above  the posterior tricuspid leaflet but not attached to the leaflet. The appearance and location are not typical for regurgitation. This structure could be a  normal variant. No evidence of tricuspid valve stenosis or significant regurgitations.  •  Other valves also appear normal in structure with no evidence of stenosis or significant regurgitations.  No vegetations seen on these valves.  •  There is no evidence of pericardial effusion.  •  Comment-  •  In view of presence of MRSA bacteremia, recommend to extend antibiotic therapy for possible infective endocarditis and repeat TYRESE in 3 months.      Assessment/Plan:   Debility status post prolonged hospitalization, patient is working with PT and OT.  Prognosis is good.  Patient is minimal assist with lower body dressing.  Modified independent for bed mobility, minimal assistance for bed to chair, chair to bed, contact-guard for sit stand.  Patient was standby assist for stand sit and contact-guard for sit stand.  He is contact-guard for stand pivot/stand step, he walked contact-guard front wheeled walker 60 feet x 2 yesterday.  Plan discharge date 11/22.    Bacteremia, septic arthritis, MRSA with suspected endocarditis.  Patient will complete his daptomycin course on Monday 11/21.  We will plan on getting a TYRESE that day and possibly discharge on 11/22 patient is getting weekly CPK checks on the daptomycin.  His CRP was noted to rise, ID is repeating blood cultures today.  They did mention a low-grade elevation in temperature but temperature curve has been overall flat to this point, continue to monitor    Leukopenia and neutropenia secondary to cirrhosis.  Overall stable, follow.    DVT prophylaxis, Arixtra    Diabetes, 72 glucose this a.m., have decreased his basal insulin and will follow for any further need for adjustments.    Orthostasis, improved/resolved on current medication Florinef and midodrine.  Would continue Florinef, possibly next week and try to begin weaning the midodrine.    Bowels are moving.    Ja Bowers MD

## 2022-11-13 LAB
BASOPHILS # BLD AUTO: 0.01 10*3/MM3 (ref 0–0.2)
BASOPHILS NFR BLD AUTO: 0.4 % (ref 0–1.5)
DEPRECATED RDW RBC AUTO: 53.1 FL (ref 37–54)
EOSINOPHIL # BLD AUTO: 0.05 10*3/MM3 (ref 0–0.4)
EOSINOPHIL NFR BLD AUTO: 1.8 % (ref 0.3–6.2)
ERYTHROCYTE [DISTWIDTH] IN BLOOD BY AUTOMATED COUNT: 17.5 % (ref 12.3–15.4)
GLUCOSE BLDC GLUCOMTR-MCNC: 147 MG/DL (ref 70–130)
GLUCOSE BLDC GLUCOMTR-MCNC: 226 MG/DL (ref 70–130)
GLUCOSE BLDC GLUCOMTR-MCNC: 280 MG/DL (ref 70–130)
HCT VFR BLD AUTO: 28.3 % (ref 37.5–51)
HGB BLD-MCNC: 8.9 G/DL (ref 13–17.7)
IMM GRANULOCYTES # BLD AUTO: 0 10*3/MM3 (ref 0–0.05)
IMM GRANULOCYTES NFR BLD AUTO: 0 % (ref 0–0.5)
LYMPHOCYTES # BLD AUTO: 0.87 10*3/MM3 (ref 0.7–3.1)
LYMPHOCYTES NFR BLD AUTO: 30.7 % (ref 19.6–45.3)
MCH RBC QN AUTO: 26 PG (ref 26.6–33)
MCHC RBC AUTO-ENTMCNC: 31.4 G/DL (ref 31.5–35.7)
MCV RBC AUTO: 82.7 FL (ref 79–97)
MONOCYTES # BLD AUTO: 0.27 10*3/MM3 (ref 0.1–0.9)
MONOCYTES NFR BLD AUTO: 9.5 % (ref 5–12)
NEUTROPHILS NFR BLD AUTO: 1.63 10*3/MM3 (ref 1.7–7)
NEUTROPHILS NFR BLD AUTO: 57.6 % (ref 42.7–76)
NRBC BLD AUTO-RTO: 0 /100 WBC (ref 0–0.2)
PLATELET # BLD AUTO: 96 10*3/MM3 (ref 140–450)
PMV BLD AUTO: 10.3 FL (ref 6–12)
RBC # BLD AUTO: 3.42 10*6/MM3 (ref 4.14–5.8)
WBC NRBC COR # BLD: 2.83 10*3/MM3 (ref 3.4–10.8)

## 2022-11-13 PROCEDURE — 63710000001 INSULIN DETEMIR PER 5 UNITS: Performed by: INTERNAL MEDICINE

## 2022-11-13 PROCEDURE — 25010000002 DAPTOMYCIN PER 1 MG: Performed by: INTERNAL MEDICINE

## 2022-11-13 PROCEDURE — 85025 COMPLETE CBC W/AUTO DIFF WBC: CPT | Performed by: INTERNAL MEDICINE

## 2022-11-13 PROCEDURE — 99232 SBSQ HOSP IP/OBS MODERATE 35: CPT | Performed by: NURSE PRACTITIONER

## 2022-11-13 PROCEDURE — 82962 GLUCOSE BLOOD TEST: CPT

## 2022-11-13 PROCEDURE — 25010000002 FONDAPARINUX PER 0.5 MG: Performed by: FAMILY MEDICINE

## 2022-11-13 PROCEDURE — 63710000001 INSULIN ASPART PER 5 UNITS: Performed by: INTERNAL MEDICINE

## 2022-11-13 RX ADMIN — PANTOPRAZOLE SODIUM 40 MG: 40 TABLET, DELAYED RELEASE ORAL at 05:33

## 2022-11-13 RX ADMIN — DAPTOMYCIN 500 MG: 500 INJECTION, POWDER, LYOPHILIZED, FOR SOLUTION INTRAVENOUS at 20:59

## 2022-11-13 RX ADMIN — LEVOTHYROXINE SODIUM 25 MCG: 0.07 TABLET ORAL at 05:33

## 2022-11-13 RX ADMIN — INSULIN DETEMIR 18 UNITS: 100 INJECTION, SOLUTION SUBCUTANEOUS at 21:56

## 2022-11-13 RX ADMIN — Medication 150 MG: at 09:07

## 2022-11-13 RX ADMIN — INSULIN ASPART 5 UNITS: 100 INJECTION, SOLUTION INTRAVENOUS; SUBCUTANEOUS at 12:31

## 2022-11-13 RX ADMIN — CARBIDOPA AND LEVODOPA 10 MG: 50; 200 TABLET, EXTENDED RELEASE ORAL at 17:25

## 2022-11-13 RX ADMIN — ASPIRIN 81 MG: 81 TABLET, COATED ORAL at 09:08

## 2022-11-13 RX ADMIN — POLYETHYLENE GLYCOL (3350) 17 G: 17 POWDER, FOR SOLUTION ORAL at 09:13

## 2022-11-13 RX ADMIN — PREGABALIN 100 MG: 100 CAPSULE ORAL at 21:12

## 2022-11-13 RX ADMIN — OXYCODONE HYDROCHLORIDE AND ACETAMINOPHEN 500 MG: 500 TABLET ORAL at 09:08

## 2022-11-13 RX ADMIN — HYDROCODONE BITARTRATE AND ACETAMINOPHEN 1 TABLET: 5; 325 TABLET ORAL at 09:13

## 2022-11-13 RX ADMIN — INSULIN ASPART 8 UNITS: 100 INJECTION, SOLUTION INTRAVENOUS; SUBCUTANEOUS at 17:25

## 2022-11-13 RX ADMIN — DOCUSATE SODIUM 100 MG: 100 CAPSULE ORAL at 09:08

## 2022-11-13 RX ADMIN — DOCUSATE SODIUM 100 MG: 100 CAPSULE ORAL at 21:12

## 2022-11-13 RX ADMIN — Medication 1 TABLET: at 09:08

## 2022-11-13 RX ADMIN — CARBIDOPA AND LEVODOPA 10 MG: 50; 200 TABLET, EXTENDED RELEASE ORAL at 06:31

## 2022-11-13 RX ADMIN — HYDROCODONE BITARTRATE AND ACETAMINOPHEN 1 TABLET: 5; 325 TABLET ORAL at 17:25

## 2022-11-13 RX ADMIN — CARBIDOPA AND LEVODOPA 10 MG: 50; 200 TABLET, EXTENDED RELEASE ORAL at 12:30

## 2022-11-13 RX ADMIN — FLUDROCORTISONE ACETATE 50 MCG: 0.1 TABLET ORAL at 09:07

## 2022-11-13 RX ADMIN — PREGABALIN 100 MG: 100 CAPSULE ORAL at 09:16

## 2022-11-13 RX ADMIN — INSULIN DETEMIR 18 UNITS: 100 INJECTION, SOLUTION SUBCUTANEOUS at 09:13

## 2022-11-13 RX ADMIN — FONDAPARINUX SODIUM 2.5 MG: 2.5 INJECTION, SOLUTION SUBCUTANEOUS at 09:08

## 2022-11-13 NOTE — PLAN OF CARE
Problem: Rehabilitation ([facility]) Plan of Care  Goal: Plan of Care Review  Outcome: Ongoing, Progressing  Flowsheets (Taken 11/13/2022 0259)  Progress: improving  Plan of Care Reviewed With: patient  Outcome Evaluation:   pt calm and cooperative   resting well throughout the night   no acute distress noted   will continue to monitor  Goal: Patient-Specific Goal (Individualized)  Outcome: Ongoing, Progressing  Goal: Absence of New-Onset Illness or Injury  Outcome: Ongoing, Progressing  Intervention: Prevent Fall and Fall Injury  Recent Flowsheet Documentation  Taken 11/13/2022 0200 by Caitlyn Mota, RN  Safety Promotion/Fall Prevention:   safety round/check completed   nonskid shoes/slippers when out of bed   clutter free environment maintained   room organization consistent  Taken 11/13/2022 0000 by Caitlyn Mota, RN  Safety Promotion/Fall Prevention:   safety round/check completed   nonskid shoes/slippers when out of bed   clutter free environment maintained   room organization consistent  Taken 11/12/2022 2200 by Caitlyn Mota, RN  Safety Promotion/Fall Prevention:   safety round/check completed   nonskid shoes/slippers when out of bed   clutter free environment maintained   room organization consistent  Taken 11/12/2022 2001 by Caitlyn Mota, RN  Safety Promotion/Fall Prevention:   safety round/check completed   fall prevention program maintained   nonskid shoes/slippers when out of bed   clutter free environment maintained  Goal: Optimal Comfort and Wellbeing  Outcome: Ongoing, Progressing  Goal: Home and Community Transition Plan Established  Outcome: Ongoing, Progressing   Goal Outcome Evaluation:  Plan of Care Reviewed With: patient        Progress: improving  Outcome Evaluation: pt calm and cooperative; resting well throughout the night; no acute distress noted; will continue to monitor

## 2022-11-13 NOTE — PROGRESS NOTES
Patient was complaining of some low back spasms at times which she has had at home, he states he can reposition himself and it gets better.  Otherwise no complaints, he is eating, therapy resumes tomorrow.  Lab work is stable, vitals are good.  Repeat blood cultures negative.  Recheck CRP in the a.m. to trend this, patient is on daptomycin, checking weekly CPKs, check in a.m.  Venous Dopplers of the upper extremity negative, venous Dopplers lower extremity negative.  Glucoses are acceptable, patient is on Arixtra for DVT prophylax

## 2022-11-13 NOTE — PROGRESS NOTES
Rehabilitation Nursing  Inpatient Rehabilitation Plan of Care Note    Plan of Care  Pain    Pain Management (Active)  Current Status (10/28/2022 5:07:00 PM): potential for increased pain  Weekly Goal: pain at a tolerable level  Discharge Goal: no pain    Body Function Structure    Skin Integrity (Active)  Current Status (10/28/2022 5:02:00 PM): impaired skin integrity  Weekly Goal: improved skin integrity  Discharge Goal: wound healing    Safety    Potential for Injury (Active)  Current Status (10/28/2022 5:07:00 PM): potential for falls  Weekly Goal: no falls  Discharge Goal: no falls    Signed by: Caitlyn Mota RN

## 2022-11-13 NOTE — PROGRESS NOTES
PROGRESS NOTE         Patient Identification:  Name:  Melchor Hardwick III  Age:  57 y.o.  Sex:  male  :  1965  MRN:  [id number]  Visit Number:  [id number]  Primary Care Provider:  Missy Up APRN         LOS: 16 days       ----------------------------------------------------------------------------------------------------------------------  Subjective       Chief Complaints:    No chief complaint on file.        Interval History:      Patient resting comfortably in bed this morning.  Reports mild back spasms that are chronic in nature.  Right leg with well edema, no erythema or drainage noted, no warmth.  WBC 2.83.  Right lower extremity venous duplex negative for DVT.  Bilateral upper extremity venous duplex negative for DVT.  Blood cultures from 2022 remain in progress.  Afebrile, denies diarrhea.    Review of Systems:    Constitutional: no fever, chills and night sweats.  Generalized fatigue.  Eyes: no eye drainage, itching or redness.  HEENT: no mouth sores, dysphagia or nose bleed.  Respiratory: no for shortness of breath, cough or production of sputum.  Cardiovascular: no chest pain, no palpitations, no orthopnea.  Gastrointestinal: no nausea, vomiting or diarrhea. No abdominal pain, hematemesis or rectal bleeding.  Genitourinary: no dysuria or polyuria.  Hematologic/lymphatic: no lymph node abnormalities, no easy bruising or easy bleeding.  Musculoskeletal: no muscle or joint pain.  Stiff right knee.  Chronic back spasms.  Skin: No rash and no itching.  Neurological: no loss of consciousness, no seizure, no headache.  Behavioral/Psych: no depression or suicidal ideation.  Endocrine: no hot flashes.  Immunologic: negative.    ----------------------------------------------------------------------------------------------------------------------      Objective       [facility] Meds:  ascorbic acid, 500 mg, Oral, Daily  aspirin, 81 mg, Oral, Daily  DAPTOmycin, 6 mg/kg  (Adjusted), Intravenous, Q24H  docusate sodium, 100 mg, Oral, BID  fludrocortisone, 50 mcg, Oral, Daily  fondaparinux, 2.5 mg, Subcutaneous, Q24H  Insulin Aspart, 0-14 Units, Subcutaneous, TID AC  insulin detemir, 18 Units, Subcutaneous, Q12H  iron polysaccharides, 150 mg, Oral, Daily  levothyroxine, 25 mcg, Oral, Q AM  midodrine, 10 mg, Oral, TID AC  multivitamin with minerals, 1 tablet, Oral, Daily  pantoprazole, 40 mg, Oral, Q AM  polyethylene glycol, 17 g, Oral, Daily  pregabalin, 100 mg, Oral, Q12H      Pharmacy Consult,       ----------------------------------------------------------------------------------------------------------------------    Vital Signs:  Temp:  [98.9 °F (37.2 °C)] 98.9 °F (37.2 °C)  Heart Rate:  [73-86] 73  Resp:  [18] 18  BP: (115-121)/(62-68) 115/62  No data found.  SpO2 Percentage    11/11/22 1900 11/12/22 0749 11/12/22 1900   SpO2: 99% 97% 97%     SpO2:  [97 %] 97 %  on   ;        Body mass index is 28.34 kg/m².  Wt Readings from Last 3 Encounters:   11/01/22 89.6 kg (197 lb 8.5 oz)   09/23/22 98 kg (216 lb)   06/17/22 115 kg (254 lb)        Intake/Output Summary (Last 24 hours) at 11/13/2022 1015  Last data filed at 11/13/2022 0955  Gross per 24 hour   Intake 960 ml   Output 3225 ml   Net -2265 ml     Diet Regular; Thin; Consistent Carbohydrate  ----------------------------------------------------------------------------------------------------------------------      Physical Exam:    Constitutional: Chronically ill-appearing male resting comfortably in bed with no apparent distress.  HENT:  Head: Normocephalic and atraumatic.  Mouth:  Moist mucous membranes.    Eyes:  Conjunctivae and EOM are normal.  No scleral icterus.  Neck:  Neck supple.  No JVD present.    Cardiovascular:  Normal rate, regular rhythm and normal heart sounds with no murmur. No edema.  Pulmonary/Chest:  No respiratory distress, no wheezes, no crackles, with normal breath sounds and good air movement.  Abdominal:   Soft.  Bowel sounds are normal.  No distension and no tenderness.   Musculoskeletal:  No edema, no tenderness, and no deformity.  No swelling or redness of joints.  Neurological:  Alert and oriented to person, place, and time.  No facial droop.  No slurred speech.   Skin:  Skin is warm and dry.  No rash noted.  No pallor.  Right knee surgical incision with good healing and staples removed.  Mild edema noted to right knee with no erythema or drainage noted.  Psychiatric:  Normal mood and affect.  Behavior is normal.    ----------------------------------------------------------------------------------------------------------------------            Results from last 7 days   Lab Units 11/13/22  0142 11/12/22  0139 11/10/22  0102   CRP mg/dL  --  9.69*  --    WBC 10*3/mm3 2.83* 3.37* 3.01*   HEMOGLOBIN g/dL 8.9* 8.6* 9.0*   HEMATOCRIT % 28.3* 28.5* 29.0*   MCV fL 82.7 85.1 83.8   MCHC g/dL 31.4* 30.2* 31.0*   PLATELETS 10*3/mm3 96* 91* 97*     Results from last 7 days   Lab Units 11/12/22  0139 11/10/22  0102 11/09/22  0144   SODIUM mmol/L 136 137 134*   POTASSIUM mmol/L 4.0 3.9 4.2   CHLORIDE mmol/L 101 103 101   CO2 mmol/L 24.8 23.7 26.2   BUN mg/dL 10 11 12   CREATININE mg/dL 0.73* 0.62* 0.82   CALCIUM mg/dL 8.2* 8.4* 8.4*   GLUCOSE mg/dL 118* 110* 181*   Estimated Creatinine Clearance: 125.7 mL/min (A) (by C-G formula based on SCr of 0.73 mg/dL (L)).  No results found for: AMMONIA    Glucose   Date/Time Value Ref Range Status   11/13/2022 0547 147 (H) 70 - 130 mg/dL Final     Comment:     Meter: [id number] : [id number] YAMILET GAMA   11/12/2022 1626 162 (H) 70 - 130 mg/dL Final     Comment:     RN Notified Meter: [id number] : [id number] DAYNE VILLANUEVA   11/12/2022 1127 247 (H) 70 - 130 mg/dL Final     Comment:     Meter: [id number] : [id number] Christy Grant   11/12/2022 0615 72 70 - 130 mg/dL Final     Comment:     Meter: [id number] : [id number] Marcos Culver   11/11/2022 1555 290 (H) 70 - 130 mg/dL  Final     Comment:     Meter: [id number] : [id number] Christy Grant   11/11/2022 1142 215 (H) 70 - 130 mg/dL Final     Comment:     Meter: [id number] : [id number] Penelope Dawson   11/11/2022 0555 128 70 - 130 mg/dL Final     Comment:     Meter: [id number] : [id number] Susi Velez   11/10/2022 1553 253 (H) 70 - 130 mg/dL Final     Comment:     Meter: [id number] : [id number] NADJA RANGEL     Lab Results   Component Value Date    HGBA1C 8.80 (H) 09/20/2022     Lab Results   Component Value Date    TSH 2.390 10/10/2022    FREET4 0.87 (L) 10/10/2022       Blood Culture   Date Value Ref Range Status   10/24/2022 No growth at 5 days  Final   10/24/2022 No growth at 5 days  Final     No results found for: URINECX  No results found for: WOUNDCX  No results found for: STOOLCX  No results found for: RESPCX  Pain Management Panel     Pain Management Panel Latest Ref Rng & Units 5/24/2022 5/11/2022    CREATININE UR mg/dL 91.0 -    AMPHETAMINES SCREEN, URINE Negative - Negative    BARBITURATES SCREEN Negative - Negative    BENZODIAZEPINE SCREEN, URINE Negative - Negative    BUPRENORPHINEUR Negative - Negative    COCAINE SCREEN, URINE Negative - Negative    METHADONE SCREEN, URINE Negative - Negative    METHAMPHETAMINEUR Negative - Negative            ----------------------------------------------------------------------------------------------------------------------  Imaging Results (Last 24 Hours)     Procedure Component Value Units Date/Time    US Venous Doppler Lower Extremity Right (duplex) [[id number]] Collected: 11/12/22 1134     Updated: 11/12/22 1136    Narrative:      US Veins LE Duplex LTD RT    HISTORY:   Staph arthritis, right knee edema    TECHNIQUE:    Real-time ultrasound was performed of the right lower extremity utilizing spectral and color Doppler with compression and augmentation techniques.     COMPARISON:  None available.    FINDINGS:  No evidence of a right lower extremity deep vein  thrombosis. The common femoral vein through the popliteal vein are widely patent. There is normal compressibility with spontaneous and phasic waveforms. No calf vein thrombus. Greater saphenous vein is  patent.      Impression:      No deep venous thrombus in the right lower extremity.     Signer Name: Thomas Day MD   Signed: 11/12/2022 11:34 AM   Workstation Name: [facility]    [facility]    US Venous Doppler Upper Extremity Bilateral (duplex) [[id number]] Collected: 11/12/22 1133     Updated: 11/12/22 1135    Narrative:      US Veins UE Duplex BILAT    HISTORY:   Arm edema evaluate for venous thrombosis    TECHNIQUE:   Venous Doppler ultrasound examination of the bilateral upper extremity(s) was performed using grey-scale, spectral Doppler, and color flow Doppler ultrasound imaging.    COMPARISON:  None available.    FINDINGS:   The examination is negative.  There is no evidence of deep venous thrombosis in the internal jugular vein, subclavian vein, axillary vein or brachial veins.  There is no visible superficial venous thrombosis within the cephalic or basilic veins.      Impression:      Negative examination.  No evidence of bilateral upper extremity venous thrombosis.    Signer Name: Thomas Day MD   Signed: 11/12/2022 11:33 AM   Workstation Name: [facility]    [facility]          ----------------------------------------------------------------------------------------------------------------------    Pertinent Infectious Disease Results    CPK on 11/6/2022 remains normal at 29.  COVID-19 PCR from 10/28/2022 negative.  Blood cultures from 10/18/2022 2 out of 2 sets positive for MRSA.  Blood cultures from 10/20/2022 2 out of 2 sets positive for MRSA.  Blood cultures from 10/22/2022 1 out of 2 sets positive for MRSA.  Blood cultures from 10/24/2022 finalized as no growth.  2D echo from 10/24/2022 reports no evidence of vegetation.  Updated TYRESE on 11/1/2022 showed  a moderate sized echodense structure on the posterior tricuspid leaflet but not attached to the leaflet.  The appearance and location are not typical for regurgitation.  This structure could be a normal variant.  No evidence of tricuspid valve stenosis or significant regurgitations.  Other valves also appear normal in structure with no evidence of stenosis or significant regurgitations.  No vegetation seen on these valves.      Assessment/Plan       Assessment     Right knee septic arthritis  MRSA bacteremia  Possible endocarditis    Plan      Patient resting comfortably in bed this morning.  Reports mild back spasms that are chronic in nature.  Right leg with well edema, no erythema or drainage noted, no warmth.  WBC 2.83.  Right lower extremity venous duplex negative for DVT.  Bilateral upper extremity venous duplex negative for DVT.  Blood cultures from 11/12/2022 remain in progress.  Afebrile, denies diarrhea.    Case discussed with Dr. Bowers.  Plans for repeat CRP in the morning.  Low-grade fever has resolved.  No evidence of sepsis at this time. For now we will continue daptomycin through 11/20/2022 with follow-up TYRESE upon completion of antibiotic therapy.      Code Status:   Code Status and Medical Interventions:   Ordered at: 11/03/22 1015     Level Of Support Discussed With:    Patient     Code Status (Patient has no pulse and is not breathing):    CPR (Attempt to Resuscitate)     Medical Interventions (Patient has pulse or is breathing):    Full Support     Release to patient:    Routine Release       ROSALINA Caballero  11/13/22  10:15 EST     Electronically signed by ROSALINA Caballero, 11/13/22, 10:18 AM EST.

## 2022-11-14 LAB
ANION GAP SERPL CALCULATED.3IONS-SCNC: 8.3 MMOL/L (ref 5–15)
BASOPHILS # BLD AUTO: 0.01 10*3/MM3 (ref 0–0.2)
BASOPHILS NFR BLD AUTO: 0.4 % (ref 0–1.5)
BUN SERPL-MCNC: 9 MG/DL (ref 6–20)
BUN/CREAT SERPL: 13.2 (ref 7–25)
CALCIUM SPEC-SCNC: 8.1 MG/DL (ref 8.6–10.5)
CHLORIDE SERPL-SCNC: 98 MMOL/L (ref 98–107)
CK SERPL-CCNC: 18 U/L (ref 20–200)
CO2 SERPL-SCNC: 25.7 MMOL/L (ref 22–29)
CREAT SERPL-MCNC: 0.68 MG/DL (ref 0.76–1.27)
CRP SERPL-MCNC: 5.83 MG/DL (ref 0–0.5)
DEPRECATED RDW RBC AUTO: 53.7 FL (ref 37–54)
EGFRCR SERPLBLD CKD-EPI 2021: 108.4 ML/MIN/1.73
EOSINOPHIL # BLD AUTO: 0.07 10*3/MM3 (ref 0–0.4)
EOSINOPHIL NFR BLD AUTO: 2.9 % (ref 0.3–6.2)
ERYTHROCYTE [DISTWIDTH] IN BLOOD BY AUTOMATED COUNT: 17.2 % (ref 12.3–15.4)
GLUCOSE BLDC GLUCOMTR-MCNC: 163 MG/DL (ref 70–130)
GLUCOSE BLDC GLUCOMTR-MCNC: 231 MG/DL (ref 70–130)
GLUCOSE BLDC GLUCOMTR-MCNC: 268 MG/DL (ref 70–130)
GLUCOSE SERPL-MCNC: 238 MG/DL (ref 65–99)
HCT VFR BLD AUTO: 28 % (ref 37.5–51)
HGB BLD-MCNC: 8.6 G/DL (ref 13–17.7)
IMM GRANULOCYTES # BLD AUTO: 0.01 10*3/MM3 (ref 0–0.05)
IMM GRANULOCYTES NFR BLD AUTO: 0.4 % (ref 0–0.5)
LYMPHOCYTES # BLD AUTO: 0.67 10*3/MM3 (ref 0.7–3.1)
LYMPHOCYTES NFR BLD AUTO: 27.7 % (ref 19.6–45.3)
MCH RBC QN AUTO: 25.9 PG (ref 26.6–33)
MCHC RBC AUTO-ENTMCNC: 30.7 G/DL (ref 31.5–35.7)
MCV RBC AUTO: 84.3 FL (ref 79–97)
MONOCYTES # BLD AUTO: 0.25 10*3/MM3 (ref 0.1–0.9)
MONOCYTES NFR BLD AUTO: 10.3 % (ref 5–12)
NEUTROPHILS NFR BLD AUTO: 1.41 10*3/MM3 (ref 1.7–7)
NEUTROPHILS NFR BLD AUTO: 58.3 % (ref 42.7–76)
NRBC BLD AUTO-RTO: 0 /100 WBC (ref 0–0.2)
PLATELET # BLD AUTO: 103 10*3/MM3 (ref 140–450)
PMV BLD AUTO: 10.3 FL (ref 6–12)
POTASSIUM SERPL-SCNC: 3.9 MMOL/L (ref 3.5–5.2)
RBC # BLD AUTO: 3.32 10*6/MM3 (ref 4.14–5.8)
SODIUM SERPL-SCNC: 132 MMOL/L (ref 136–145)
WBC NRBC COR # BLD: 2.42 10*3/MM3 (ref 3.4–10.8)

## 2022-11-14 PROCEDURE — 82962 GLUCOSE BLOOD TEST: CPT

## 2022-11-14 PROCEDURE — 97116 GAIT TRAINING THERAPY: CPT

## 2022-11-14 PROCEDURE — 85025 COMPLETE CBC W/AUTO DIFF WBC: CPT | Performed by: INTERNAL MEDICINE

## 2022-11-14 PROCEDURE — 97535 SELF CARE MNGMENT TRAINING: CPT | Performed by: OCCUPATIONAL THERAPIST

## 2022-11-14 PROCEDURE — 25010000002 DAPTOMYCIN PER 1 MG: Performed by: INTERNAL MEDICINE

## 2022-11-14 PROCEDURE — 63710000001 INSULIN DETEMIR PER 5 UNITS: Performed by: INTERNAL MEDICINE

## 2022-11-14 PROCEDURE — 80048 BASIC METABOLIC PNL TOTAL CA: CPT | Performed by: INTERNAL MEDICINE

## 2022-11-14 PROCEDURE — 82550 ASSAY OF CK (CPK): CPT | Performed by: INTERNAL MEDICINE

## 2022-11-14 PROCEDURE — 97530 THERAPEUTIC ACTIVITIES: CPT

## 2022-11-14 PROCEDURE — 25010000002 FONDAPARINUX PER 0.5 MG: Performed by: FAMILY MEDICINE

## 2022-11-14 PROCEDURE — 63710000001 INSULIN ASPART PER 5 UNITS: Performed by: INTERNAL MEDICINE

## 2022-11-14 PROCEDURE — 97110 THERAPEUTIC EXERCISES: CPT | Performed by: OCCUPATIONAL THERAPIST

## 2022-11-14 PROCEDURE — 86140 C-REACTIVE PROTEIN: CPT | Performed by: INTERNAL MEDICINE

## 2022-11-14 PROCEDURE — 97530 THERAPEUTIC ACTIVITIES: CPT | Performed by: OCCUPATIONAL THERAPIST

## 2022-11-14 PROCEDURE — 99231 SBSQ HOSP IP/OBS SF/LOW 25: CPT | Performed by: FAMILY MEDICINE

## 2022-11-14 PROCEDURE — 99232 SBSQ HOSP IP/OBS MODERATE 35: CPT | Performed by: INTERNAL MEDICINE

## 2022-11-14 PROCEDURE — 97110 THERAPEUTIC EXERCISES: CPT

## 2022-11-14 RX ORDER — CYCLOBENZAPRINE HCL 10 MG
5 TABLET ORAL 3 TIMES DAILY PRN
Status: DISCONTINUED | OUTPATIENT
Start: 2022-11-14 | End: 2022-11-15

## 2022-11-14 RX ADMIN — CYCLOBENZAPRINE 5 MG: 10 TABLET, FILM COATED ORAL at 12:18

## 2022-11-14 RX ADMIN — DOCUSATE SODIUM 100 MG: 100 CAPSULE ORAL at 08:44

## 2022-11-14 RX ADMIN — HYDROCODONE BITARTRATE AND ACETAMINOPHEN 1 TABLET: 5; 325 TABLET ORAL at 08:45

## 2022-11-14 RX ADMIN — LEVOTHYROXINE SODIUM 25 MCG: 0.07 TABLET ORAL at 05:41

## 2022-11-14 RX ADMIN — FONDAPARINUX SODIUM 2.5 MG: 2.5 INJECTION, SOLUTION SUBCUTANEOUS at 08:44

## 2022-11-14 RX ADMIN — DAPTOMYCIN 500 MG: 500 INJECTION, POWDER, LYOPHILIZED, FOR SOLUTION INTRAVENOUS at 21:36

## 2022-11-14 RX ADMIN — INSULIN ASPART 5 UNITS: 100 INJECTION, SOLUTION INTRAVENOUS; SUBCUTANEOUS at 12:19

## 2022-11-14 RX ADMIN — INSULIN DETEMIR 18 UNITS: 100 INJECTION, SOLUTION SUBCUTANEOUS at 21:36

## 2022-11-14 RX ADMIN — INSULIN ASPART 8 UNITS: 100 INJECTION, SOLUTION INTRAVENOUS; SUBCUTANEOUS at 17:33

## 2022-11-14 RX ADMIN — FLUDROCORTISONE ACETATE 50 MCG: 0.1 TABLET ORAL at 08:44

## 2022-11-14 RX ADMIN — PREGABALIN 100 MG: 100 CAPSULE ORAL at 21:35

## 2022-11-14 RX ADMIN — POLYETHYLENE GLYCOL (3350) 17 G: 17 POWDER, FOR SOLUTION ORAL at 08:44

## 2022-11-14 RX ADMIN — INSULIN DETEMIR 18 UNITS: 100 INJECTION, SOLUTION SUBCUTANEOUS at 08:42

## 2022-11-14 RX ADMIN — OXYCODONE HYDROCHLORIDE AND ACETAMINOPHEN 500 MG: 500 TABLET ORAL at 08:44

## 2022-11-14 RX ADMIN — Medication 1 TABLET: at 08:44

## 2022-11-14 RX ADMIN — ASPIRIN 81 MG: 81 TABLET, COATED ORAL at 08:44

## 2022-11-14 RX ADMIN — CARBIDOPA AND LEVODOPA 10 MG: 50; 200 TABLET, EXTENDED RELEASE ORAL at 12:18

## 2022-11-14 RX ADMIN — INSULIN ASPART 3 UNITS: 100 INJECTION, SOLUTION INTRAVENOUS; SUBCUTANEOUS at 08:42

## 2022-11-14 RX ADMIN — CARBIDOPA AND LEVODOPA 10 MG: 50; 200 TABLET, EXTENDED RELEASE ORAL at 06:34

## 2022-11-14 RX ADMIN — PANTOPRAZOLE SODIUM 40 MG: 40 TABLET, DELAYED RELEASE ORAL at 05:41

## 2022-11-14 RX ADMIN — Medication 150 MG: at 14:17

## 2022-11-14 RX ADMIN — CYCLOBENZAPRINE 5 MG: 10 TABLET, FILM COATED ORAL at 21:35

## 2022-11-14 RX ADMIN — CARBIDOPA AND LEVODOPA 10 MG: 50; 200 TABLET, EXTENDED RELEASE ORAL at 17:33

## 2022-11-14 RX ADMIN — PREGABALIN 100 MG: 100 CAPSULE ORAL at 08:45

## 2022-11-14 NOTE — PROGRESS NOTES
PROGRESS NOTE         Patient Identification:  Name:  Melchor Hardwick III  Age:  57 y.o.  Sex:  male  :  1965  MRN:  [id number]  Visit Number:  [id number]  Primary Care Provider:  Missy Up APRN         LOS: 17 days       ----------------------------------------------------------------------------------------------------------------------  Subjective       Chief Complaints:    No chief complaint on file.        Interval History:      Patient sitting up in bed this morning.  Getting ready to go participate in physical therapy this morning.  Right knee edema significantly improved.  CK normal at 18.  CRP improved at 5.83.  Blood cultures from 2022 show no growth thus far.  Afebrile, no diarrhea.     Review of Systems:    Constitutional: no fever, chills and night sweats.  Generalized fatigue.  Eyes: no eye drainage, itching or redness.  HEENT: no mouth sores, dysphagia or nose bleed.  Respiratory: no for shortness of breath, cough or production of sputum.  Cardiovascular: no chest pain, no palpitations, no orthopnea.  Gastrointestinal: no nausea, vomiting or diarrhea. No abdominal pain, hematemesis or rectal bleeding.  Genitourinary: no dysuria or polyuria.  Hematologic/lymphatic: no lymph node abnormalities, no easy bruising or easy bleeding.  Musculoskeletal: no muscle or joint pain.  Stiff right knee.  Chronic back spasms.  Skin: No rash and no itching.  Neurological: no loss of consciousness, no seizure, no headache.  Behavioral/Psych: no depression or suicidal ideation.  Endocrine: no hot flashes.  Immunologic: negative.    ----------------------------------------------------------------------------------------------------------------------      Objective       [facility] Meds:  ascorbic acid, 500 mg, Oral, Daily  aspirin, 81 mg, Oral, Daily  DAPTOmycin, 6 mg/kg (Adjusted), Intravenous, Q24H  docusate sodium, 100 mg, Oral, BID  fludrocortisone, 50 mcg, Oral,  Daily  fondaparinux, 2.5 mg, Subcutaneous, Q24H  Insulin Aspart, 0-14 Units, Subcutaneous, TID AC  insulin detemir, 18 Units, Subcutaneous, Q12H  iron polysaccharides, 150 mg, Oral, Daily  levothyroxine, 25 mcg, Oral, Q AM  midodrine, 10 mg, Oral, TID AC  multivitamin with minerals, 1 tablet, Oral, Daily  pantoprazole, 40 mg, Oral, Q AM  polyethylene glycol, 17 g, Oral, Daily  pregabalin, 100 mg, Oral, Q12H      Pharmacy Consult,       ----------------------------------------------------------------------------------------------------------------------    Vital Signs:  Temp:  [98.4 °F (36.9 °C)] 98.4 °F (36.9 °C)  Heart Rate:  [72-84] 72  Resp:  [18] 18  BP: (122-130)/(66-72) 122/70  No data found.  SpO2 Percentage    11/12/22 1900 11/13/22 0905 11/13/22 1900   SpO2: 97% 97% 97%     SpO2:  [97 %] 97 %  on   ;        Body mass index is 28.34 kg/m².  Wt Readings from Last 3 Encounters:   11/01/22 89.6 kg (197 lb 8.5 oz)   09/23/22 98 kg (216 lb)   06/17/22 115 kg (254 lb)        Intake/Output Summary (Last 24 hours) at 11/14/2022 0971  Last data filed at 11/14/2022 0853  Gross per 24 hour   Intake 1200 ml   Output 4225 ml   Net -3025 ml     Diet Regular; Thin; Consistent Carbohydrate  ----------------------------------------------------------------------------------------------------------------------      Physical Exam:    Constitutional: Chronically ill-appearing male resting comfortably in bed with no apparent distress.  HENT:  Head: Normocephalic and atraumatic.  Mouth:  Moist mucous membranes.    Eyes:  Conjunctivae and EOM are normal.  No scleral icterus.  Neck:  Neck supple.  No JVD present.    Cardiovascular:  Normal rate, regular rhythm and normal heart sounds with no murmur. No edema.  Pulmonary/Chest:  No respiratory distress, no wheezes, no crackles, with normal breath sounds and good air movement.  Abdominal:  Soft.  Bowel sounds are normal.  No distension and no tenderness.   Musculoskeletal:  No edema,  no tenderness, and no deformity.  No swelling or redness of joints.  Neurological:  Alert and oriented to person, place, and time.  No facial droop.  No slurred speech.   Skin:  Skin is warm and dry.  No rash noted.  No pallor.  Right knee surgical incision with good healing and staples removed.  Minimal edema with no erythema or drainage.   Psychiatric:  Normal mood and affect.  Behavior is normal.    ----------------------------------------------------------------------------------------------------------------------  Results from last 7 days   Lab Units 11/14/22 0226   CK TOTAL U/L 18*           Results from last 7 days   Lab Units 11/14/22 0226 11/13/22 0142 11/12/22 0139   CRP mg/dL 5.83*  --  9.69*   WBC 10*3/mm3 2.42* 2.83* 3.37*   HEMOGLOBIN g/dL 8.6* 8.9* 8.6*   HEMATOCRIT % 28.0* 28.3* 28.5*   MCV fL 84.3 82.7 85.1   MCHC g/dL 30.7* 31.4* 30.2*   PLATELETS 10*3/mm3 103* 96* 91*     Results from last 7 days   Lab Units 11/14/22  0226 11/12/22  0139 11/10/22  0102   SODIUM mmol/L 132* 136 137   POTASSIUM mmol/L 3.9 4.0 3.9   CHLORIDE mmol/L 98 101 103   CO2 mmol/L 25.7 24.8 23.7   BUN mg/dL 9 10 11   CREATININE mg/dL 0.68* 0.73* 0.62*   CALCIUM mg/dL 8.1* 8.2* 8.4*   GLUCOSE mg/dL 238* 118* 110*   Estimated Creatinine Clearance: 134.9 mL/min (A) (by C-G formula based on SCr of 0.68 mg/dL (L)).  No results found for: AMMONIA    Glucose   Date/Time Value Ref Range Status   11/14/2022 0602 163 (H) 70 - 130 mg/dL Final     Comment:     Meter: [id number] : [id number] YAMILET GAMA   11/13/2022 1623 280 (H) 70 - 130 mg/dL Final     Comment:     RN Notified Meter: [id number] : [id number] DAYNE KAY   11/13/2022 1150 226 (H) 70 - 130 mg/dL Final     Comment:     Meter: [id number] : [id number] Penelope Hummela   11/13/2022 0547 147 (H) 70 - 130 mg/dL Final     Comment:     Meter: [id number] : [id number] YAMILET GAMA   11/12/2022 1626 162 (H) 70 - 130 mg/dL Final     Comment:     RN Notified  Meter: [id number] : [id number] DAYNE VILLANUEVA   11/12/2022 1127 247 (H) 70 - 130 mg/dL Final     Comment:     Meter: [id number] : [id number] Christy Grant   11/12/2022 0615 72 70 - 130 mg/dL Final     Comment:     Meter: [id number] : [id number] Marcos Crystal   11/11/2022 1555 290 (H) 70 - 130 mg/dL Final     Comment:     Meter: [id number] : [id number] Christy Grant     Lab Results   Component Value Date    HGBA1C 8.80 (H) 09/20/2022     Lab Results   Component Value Date    TSH 2.390 10/10/2022    FREET4 0.87 (L) 10/10/2022       Blood Culture   Date Value Ref Range Status   10/24/2022 No growth at 5 days  Final   10/24/2022 No growth at 5 days  Final     No results found for: URINECX  No results found for: WOUNDCX  No results found for: STOOLCX  No results found for: RESPCX  Pain Management Panel     Pain Management Panel Latest Ref Rng & Units 5/24/2022 5/11/2022    CREATININE UR mg/dL 91.0 -    AMPHETAMINES SCREEN, URINE Negative - Negative    BARBITURATES SCREEN Negative - Negative    BENZODIAZEPINE SCREEN, URINE Negative - Negative    BUPRENORPHINEUR Negative - Negative    COCAINE SCREEN, URINE Negative - Negative    METHADONE SCREEN, URINE Negative - Negative    METHAMPHETAMINEUR Negative - Negative            ----------------------------------------------------------------------------------------------------------------------  Imaging Results (Last 24 Hours)     ** No results found for the last 24 hours. **          ----------------------------------------------------------------------------------------------------------------------    Pertinent Infectious Disease Results    CPK on 11/6/2022 remains normal at 29.  COVID-19 PCR from 10/28/2022 negative.  Blood cultures from 10/18/2022 2 out of 2 sets positive for MRSA.  Blood cultures from 10/20/2022 2 out of 2 sets positive for MRSA.  Blood cultures from 10/22/2022 1 out of 2 sets positive for MRSA.  Blood cultures from 10/24/2022 finalized as no  growth.  2D echo from 10/24/2022 reports no evidence of vegetation.  Updated TYRESE on 11/1/2022 showed a moderate sized echodense structure on the posterior tricuspid leaflet but not attached to the leaflet.  The appearance and location are not typical for regurgitation.  This structure could be a normal variant.  No evidence of tricuspid valve stenosis or significant regurgitations.  Other valves also appear normal in structure with no evidence of stenosis or significant regurgitations.  No vegetation seen on these valves.    Right lower extremity venous duplex negative for DVT.  Bilateral upper extremity venous duplex negative for DVT.         Assessment/Plan       Assessment     Right knee septic arthritis  MRSA bacteremia  Possible endocarditis    Plan      I saw and examined the patient myself this morning with ROSALINA Caballero with whom I discussed the plan of care and primary RN and here are my findings:    Patient is sitting up in bed and therapy is coming to get him for physical therapy this morning and right knee edema has significantly improved with CPK normal at 18 with CRP improved down to 5.83.    I do believe patient's occasional episodes of swelling and tenderness of his knee are related to physical therapy efforts as he looks the best on Monday after a weekend of therapy.  Case was discussed with primary team Dr. Mike and blood cultures from 11/12/2022 showed no growth so far with no fever or diarrhea reported overnight.    For now would recommend to continue with daptomycin through 11/20/2022 with follow-up TYRESE on Monday, 11/21/2022 with a potential discharge on 11/22/2022.      Code Status:   Code Status and Medical Interventions:   Ordered at: 11/03/22 1015     Level Of Support Discussed With:    Patient     Code Status (Patient has no pulse and is not breathing):    CPR (Attempt to Resuscitate)     Medical Interventions (Patient has pulse or is breathing):    Full Support     Release to  patient:    Routine Release       ROSALINA Caballero  11/14/22  09:33 EST     Electronically signed by ROSALINA Caballero, 11/14/22, 9:38 AM EST.          Electronically signed by Marilynn Giordano MD, 11/14/22, 12:42 PM EST.

## 2022-11-14 NOTE — THERAPY TREATMENT NOTE
Inpatient Rehabilitation - Physical Therapy Treatment Note       YVONNE Gaines     Patient Name: Melchor Hardwick III  : 1965  MRN: [id number]    Today's Date: 2022                    Admit Date: 10/28/2022      Visit Dx:     ICD-10-CM ICD-9-CM   1. Staphylococcal arthritis of right knee (HCC)  M00.061 711.06     041.10       Patient Active Problem List   Diagnosis   • Hyperlipidemia   • Hypertensive disorder   • Obesity   • Type 2 diabetes mellitus with hyperglycemia, with long-term current use of insulin (HCC)   • Gas gangrene of foot (HCC)   • Cellulitis in diabetic foot (HCC)   • Other acute osteomyelitis of left foot (HCC)   • Osteomyelitis (HCC)   • Critical illness myopathy   • Staphylococcal arthritis of right knee (HCC)   • Other cirrhosis of liver (HCC)   • MRSA bacteremia   • Endocarditis of tricuspid valve   • Wound of right buttock   • History of osteomyelitis   • Debility       Past Medical History:   Diagnosis Date   • Diabetes mellitus (HCC)    • Hyperlipidemia    • Hypertension    • Pyogenic arthritis of right knee joint, due to unspecified organism (HCC) 2022       Past Surgical History:   Procedure Laterality Date   • ABSCESS DRAINAGE      PERINEUM   • AMPUTATION FOOT Left 2022    Procedure: AMPUTATION FOOT;  Surgeon: Addison Colby MD;  Location: [facility] OR;  Service: Podiatry;  Laterality: Left;   • INCISION AND DRAINAGE LEG Left 05/10/2022    Procedure: INCISION AND DRAINAGE LOWER EXTREMITY;  Surgeon: Addison Colby MD;  Location:  [facility] OR;  Service: Podiatry;  Laterality: Left;   • KNEE INCISION AND DRAINAGE Right 2022    Procedure: KNEE INCISION AND DRAINAGE RIGHT;  Surgeon: Brain Bentley MD;  Location: [facility] OR;  Service: Orthopedics;  Laterality: Right;   • WOUND DEBRIDEMENT Left 2022    Procedure: DEBRIDEMENT FOOT;  Surgeon: Addison Colby MD;  Location: [facility] OR;  Service: Podiatry;  Laterality: Left;       PT ASSESSMENT (last 12 hours)     IRF PT  Evaluation and Treatment     Row Name 11/14/22 1514          PT Time and Intention    Document Type daily treatment  -RF     Mode of Treatment individual therapy;physical therapy  -RF     Patient/Family/Caregiver Comments/Observations Pt reports severe low back pain in PM; R knee pain noted BID.  -RF     Row Name 11/14/22 1514          General Information    Patient Profile Reviewed yes  -RF     Existing Precautions/Restrictions fall;brace worn when out of bed  air cast LLE  -RF     Row Name 11/14/22 1514          Cognition/Psychosocial    Affect/Mental Status (Cognition) WFL  -RF     Follows Commands (Cognition) WFL  -RF     Cognitive Function WFL  -RF     Row Name 11/14/22 1514          Bed Mobility    Bed Mobility bed mobility (all) activities  -RF     All Activities, [place] (Bed Mobility) modified independence  -RF     Assistive Device (Bed Mobility) bed rails  -RF     Row Name 11/14/22 1514          Transfer Assessment/Treatment    Transfers bed-chair transfer;chair-bed transfer;sit-stand transfer;stand-sit transfer;car transfer  -RF     Row Name 11/14/22 1514          Bed-Chair Transfer    Bed-Chair [place] (Transfers) contact guard;standby assist  -RF     Assistive Device (Bed-Chair Transfers) walker, front-wheeled  -RF     Row Name 11/14/22 1514          Chair-Bed Transfer    Chair-Bed [place] (Transfers) contact guard;standby assist  -RF     Assistive Device (Chair-Bed Transfers) wheelchair;walker, front-wheeled  -RF     Row Name 11/14/22 1514          Sit-Stand Transfer    Sit-Stand [place] (Transfers) contact guard;standby assist  -RF     Assistive Device (Sit-Stand Transfers) walker, front-wheeled  -RF     Row Name 11/14/22 1514          Stand-Sit Transfer    Stand-Sit [place] (Transfers) contact guard;standby assist  -RF     Assistive Device (Stand-Sit Transfers) wheelchair;walker, front-wheeled  -RF     Row Name 11/14/22 1514          Gait/Stairs (Locomotion)    Gait/Stairs  Locomotion gait/ambulation independence;gait/ambulation assistive device;distance ambulated  -RF     [place] Level (Gait) contact guard  -RF     Assistive Device (Gait) walker, front-wheeled  -RF     Distance in Feet (Gait) 40 in AM  -RF     Pattern (Gait) step-to;step-through  -RF     Bilateral Gait Deviations forward flexed posture  -RF     Right Sided Gait Deviations heel strike decreased;weight shift ability decreased  -RF     Row Name 11/14/22 1514          Safety Issues, Functional Mobility    Impairments Affecting Function (Mobility) balance;endurance/activity tolerance;pain;range of motion (ROM);postural/trunk control  -RF     Row Name 11/14/22 1514          Hip (Therapeutic Exercise)    Hip Strengthening (Therapeutic Exercise) bilateral;flexion;extension;aBduction;aDduction;heel slides;marching while seated;sitting;2 lb free weight;resistance band;blue;2 sets;10 repetitions;other (see comments)  BID  -RF     Row Name 11/14/22 1514          Knee (Therapeutic Exercise)    Knee PROM (Therapeutic Exercise) right;flexion;extension;sitting;10 second hold;10 repetitions;other (see comments)  Supine hang with #6 X 2 mins, KNee extension with OP, knee flexion with OP on skateboard  -RF     Knee Strengthening (Therapeutic Exercise) bilateral;flexion;extension;heel slides;marching while seated;LAQ (long arc quad);hamstring curls;sitting;2 lb free weight;resistance band;blue;2 sets;10 repetitions  -RF     Row Name 11/14/22 1514          Ankle (Therapeutic Exercise)    Ankle Strengthening (Therapeutic Exercise) bilateral;dorsiflexion;plantarflexion;sitting;2 lb free weight;2 sets;10 repetitions  -RF     Row Name 11/14/22 1514          Positioning and Restraints    Pre-Treatment Position sitting in chair/recliner  BID  -RF     In Bed supine;call light within reach;encouraged to call for assist  PM  -RF     In Wheelchair sitting;call light within reach;encouraged to call for assist  AM  -RF     Row Name 11/14/22 1514           Daily Progress Summary (PT)    Functional Goal Overall Progress (PT) progressing toward functional goals as expected  -RF     Daily Progress Summary (PT) Fair functional mobility and ambulation quality noted this date. Activity and progress hindered due to low back pain and R knee pain. Gait deviation continually present due to decreased knee ROM. Continued skilled care required for further improvements to ensure maximum safe function upon D/C.  -RF     Impairments Still Limiting Function (PT) functional activity tolerance impairment;pain;range of motion deficit;strength deficit  -RF     Recommendations (PT) Continue per current POC  -RF     Row Name 11/14/22 1514          IRF PT Goals    Bed Mobility Goal Selection (PT-IRF) bed mobility, PT goal 1  -RF     Transfer Goal Selection (PT-IRF) transfers, PT goal 1  -RF     Gait (Walking Locomotion) Goal Selection (PT-IRF) gait, PT goal 1  -RF     Row Name 11/14/22 1514          Bed Mobility Goal 1 (PT-IRF)    Activity/Assistive Device (Bed Mobility Goal 1, PT-IRF) bed mobility activities, all  -RF     [place] Level (Bed Mobility Goal 1, PT-IRF) independent  -RF     Time Frame (Bed Mobility Goal 1, PT-IRF) long-term goal (LTG)  -RF     Progress/Outcomes (Bed Mobility Goal 1, PT-IRF) goal met  -RF     Row Name 11/14/22 1514          Transfer Goal 1 (PT-IRF)    Activity/Assistive Device (Transfer Goal 1, PT-IRF) sit-to-stand/stand-to-sit;bed-to-chair/chair-to-bed;walker, rolling  -RF     [place] Level (Transfer Goal 1, PT-IRF) modified independence  -RF     Time Frame (Transfer Goal 1, PT-IRF) long-term goal (LTG)  -RF     Progress/Outcomes (Transfer Goal 1, PT-IRF) new goal  -RF     Row Name 11/14/22 1514          Gait/Walking Locomotion Goal 1 (PT-IRF)    Activity/Assistive Device (Gait/Walking Locomotion Goal 1, PT-IRF) gait (walking locomotion);assistive device use  -RF     Gait/Walking Locomotion Distance Goal 1 (PT-IRF) 100'  -RF     [place]  Level (Gait/Walking Locomotion Goal 1, PT-IRF) modified independence  -RF     Time Frame (Gait/Walking Locomotion Goal 1, PT-IRF) long-term goal (LTG)  -RF     Progress/Outcomes (Gait/Walking Locomotion Goal 1, PT-IRF) goal revised this date  -RF           User Key  (r) = Recorded By, (t) = Taken By, (c) = Cosigned By    Initials Name Provider Type    RF Meron Lei, PTA Physical Therapist Assistant              Wound 09/21/22 1532 Right anterior knee Incision (Active)   Dressing Appearance open to air 11/14/22 0844   Closure Approximated 11/13/22 2113   Base dry;clean 11/14/22 0844   Periwound Temperature warm 11/14/22 0844   Drainage Amount none 11/14/22 0844   Care, Wound antimicrobial agent applied 11/13/22 2113   Dressing Care open to air 11/14/22 0844       Wound 10/28/22 1827 Right coccyx Pressure Injury (Active)   Dressing Appearance open to air 11/14/22 0844   Closure Open to air 11/13/22 2113   Base red;non-blanchable;blanchable 11/14/22 0844   Drainage Amount none 11/14/22 0844   Care, Wound barrier applied 11/14/22 0844   Dressing Care open to air 11/14/22 0844     Physical Therapy Education     Title: PT OT SLP Therapies (Done)     Topic: Physical Therapy (Done)     Point: Mobility training (Done)     Learning Progress Summary           Patient Acceptance, E,TB, VU by RF at 11/14/2022 1528    Acceptance, E,TB, VU by RF at 11/10/2022 1551    Acceptance, E,TB, VU by RF at 11/9/2022 1622    Acceptance, E,TB, VU by DG at 11/9/2022 0050    Acceptance, E,TB, VU by RF at 11/8/2022 1539    Acceptance, E,TB, VU by DG at 11/8/2022 0114    Acceptance, E,TB, VU by RF at 11/7/2022 1607    Acceptance, E,TB, VU by DG at 11/6/2022 2023    Acceptance, E,TB, VU by RF at 11/4/2022 1551    Acceptance, E,TB, VU,DU by HR at 11/3/2022 1541    Acceptance, E,D, VU,NR by KIERRA at 11/3/2022 1442    Acceptance, E,TB, VU by BRYSON at 11/1/2022 2016    Acceptance, E,D, VU,NR by KIERRA at 11/1/2022 1541    Acceptance, E,TB, VU by BRYSON at  10/31/2022 2315    Acceptance, E,D, VU,NR by RG at 10/31/2022 1446                   Point: Home exercise program (Done)     Learning Progress Summary           Patient Acceptance, E,TB, VU by RF at 11/14/2022 1528    Acceptance, E,TB, VU by RF at 11/10/2022 1551    Acceptance, E,TB, VU by RF at 11/9/2022 1622    Acceptance, E,TB, VU by DG at 11/9/2022 0050    Acceptance, E,TB, VU by RF at 11/8/2022 1539    Acceptance, E,TB, VU by DG at 11/8/2022 0114    Acceptance, E,TB, VU by RF at 11/7/2022 1607    Acceptance, E,TB, VU by DG at 11/6/2022 2023    Acceptance, E,TB, VU by RF at 11/4/2022 1551    Acceptance, E,TB, VU,DU by HR at 11/3/2022 1541    Acceptance, E,D, VU,NR by RG at 11/3/2022 1442    Acceptance, E,TB, VU by DG at 11/1/2022 2016    Acceptance, E,D, VU,NR by RG at 11/1/2022 1541    Acceptance, E,TB, VU by DG at 10/31/2022 2315    Acceptance, E,D, VU,NR by RG at 10/31/2022 1446                   Point: Body mechanics (Done)     Learning Progress Summary           Patient Acceptance, E,TB, VU by RF at 11/14/2022 1528    Acceptance, E,TB, VU by RF at 11/10/2022 1551    Acceptance, E,TB, VU by RF at 11/9/2022 1622    Acceptance, E,TB, VU by DG at 11/9/2022 0050    Acceptance, E,TB, VU by RF at 11/8/2022 1539    Acceptance, E,TB, VU by DG at 11/8/2022 0114    Acceptance, E,TB, VU by RF at 11/7/2022 1607    Acceptance, E,TB, VU by DG at 11/6/2022 2023    Acceptance, E,TB, VU by RF at 11/4/2022 1551    Acceptance, E,TB, VU,DU by HR at 11/3/2022 1541    Acceptance, E,D, VU,NR by RG at 11/3/2022 1442    Acceptance, E,TB, VU by DG at 11/1/2022 2016    Acceptance, E,D, VU,NR by RG at 11/1/2022 1541    Acceptance, E,TB, VU by DG at 10/31/2022 2315    Acceptance, E,D, VU,NR by RG at 10/31/2022 1446                   Point: Precautions (Done)     Learning Progress Summary           Patient Acceptance, E,TB, VU by RF at 11/14/2022 1528    Acceptance, E,TB, VU by RF at 11/10/2022 1551    Acceptance, E,TB, VU by RF at  11/9/2022 1622    Acceptance, E,TB, VU by DG at 11/9/2022 0050    Acceptance, E,TB, VU by RF at 11/8/2022 1539    Acceptance, E,TB, VU by DG at 11/8/2022 0114    Acceptance, E,TB, VU by RF at 11/7/2022 1607    Acceptance, E,TB, VU by DG at 11/6/2022 2023    Acceptance, E,TB, VU by RF at 11/4/2022 1551    Acceptance, E,TB, VU,DU by HR at 11/3/2022 1541    Acceptance, E,D, VU,NR by RG at 11/3/2022 1442    Acceptance, E,TB, VU by DG at 11/1/2022 2016    Acceptance, E,D, VU,NR by RG at 11/1/2022 1541    Acceptance, E,TB, VU by DG at 10/31/2022 2315    Acceptance, E,D, VU,NR by RG at 10/31/2022 1446                               User Key     Initials Effective Dates Name Provider Type Discipline     06/16/21 -  Phyllis Yañez RN Registered Nurse Nurse     06/16/21 -  Meron Lei PTA Physical Therapist Assistant PT    RG 06/16/21 -  Michi Mckeon PTA Physical Therapist Assistant PT    HR 01/14/22 -  Hanna Murphy PTA Physical Therapist Assistant PT                PT Recommendation and Plan    Frequency of Treatment (PT): 5 times per week, 90 minutes per session     Daily Progress Summary (PT)  Functional Goal Overall Progress (PT): progressing toward functional goals as expected  Daily Progress Summary (PT): Fair functional mobility and ambulation quality noted this date. Activity and progress hindered due to low back pain and R knee pain. Gait deviation continually present due to decreased knee ROM. Continued skilled care required for further improvements to ensure maximum safe function upon D/C.  Impairments Still Limiting Function (PT): functional activity tolerance impairment, pain, range of motion deficit, strength deficit  Recommendations (PT): Continue per current POC               Time Calculation:      PT Charges     Row Name 11/14/22 1529 11/14/22 1528          Time Calculation    Start Time 1330  -RF 1000  -RF     Stop Time 1415  -RF 1045  -RF     Time Calculation (min) 45 min  -RF 45 min  -RF      PT Received On 11/14/22  -RF 11/14/22  -RF     PT - Next Appointment 11/15/22  -RF 11/14/22  -RF     PT Goal Re-Cert Due Date 11/25/22  -RF 11/25/22  -RF        Time Calculation- PT    Total Timed Code Minutes- PT 45 minute(s)  -RF 45 minute(s)  -RF           User Key  (r) = Recorded By, (t) = Taken By, (c) = Cosigned By    Initials Name Provider Type    RF Meron Lei PTA Physical Therapist Assistant                Therapy Charges for Today     Code Description Service Date Service Provider Modifiers Qty    [id number]  GAIT TRAINING EA 15 MIN 11/14/2022 Meron Lei, LIZETT GP, CQ 1    [id number]  PT THER PROC EA 15 MIN 11/14/2022 Meron Lei PTA GP, CQ 4    [id number]  PT THERAPEUTIC ACT EA 15 MIN 11/14/2022 Meron Lei PTA GP, CQ 1                   Meron Lei PTA  11/14/2022

## 2022-11-14 NOTE — PLAN OF CARE
Problem: Rehabilitation (IRF) Plan of Care  Goal: Plan of Care Review  Outcome: Ongoing, Progressing  Flowsheets  Taken 11/14/2022 0230 by Caitlyn Mota, RN  Plan of Care Reviewed With: patient  Outcome Evaluation:   pt resting well throughout the night   calm and cooperative   no acute distress noted  Taken 11/13/2022 1427 by Naye Wilson RN  Progress: no change  Goal: Patient-Specific Goal (Individualized)  Outcome: Ongoing, Progressing  Goal: Absence of New-Onset Illness or Injury  Outcome: Ongoing, Progressing  Intervention: Prevent Fall and Fall Injury  Recent Flowsheet Documentation  Taken 11/14/2022 0200 by Caitlyn Mota RN  Safety Promotion/Fall Prevention: safety round/check completed  Taken 11/14/2022 0000 by Caitlyn Mota RN  Safety Promotion/Fall Prevention:   safety round/check completed   nonskid shoes/slippers when out of bed   clutter free environment maintained  Taken 11/13/2022 2200 by Caitlyn Mota RN  Safety Promotion/Fall Prevention:   safety round/check completed   nonskid shoes/slippers when out of bed   clutter free environment maintained  Taken 11/13/2022 2009 by Caitlyn Mota, RN  Safety Promotion/Fall Prevention:   safety round/check completed   nonskid shoes/slippers when out of bed   clutter free environment maintained  Goal: Optimal Comfort and Wellbeing  Outcome: Ongoing, Progressing  Goal: Home and Community Transition Plan Established  Outcome: Ongoing, Progressing   Goal Outcome Evaluation:  Plan of Care Reviewed With: patient        Progress: improving  Outcome Evaluation: pt resting well throughout the night; calm and cooperative; no acute distress noted

## 2022-11-14 NOTE — THERAPY TREATMENT NOTE
Inpatient Rehabilitation - Occupational Therapy Treatment Note    YVONNE Gaines     Patient Name: Melchor Hardwick III  : 1965  MRN: [id number]    Today's Date: 2022                 Admit Date: 10/28/2022         ICD-10-CM ICD-9-CM   1. Staphylococcal arthritis of right knee (HCC)  M00.061 711.06     041.10       Patient Active Problem List   Diagnosis   • Hyperlipidemia   • Hypertensive disorder   • Obesity   • Type 2 diabetes mellitus with hyperglycemia, with long-term current use of insulin (HCC)   • Gas gangrene of foot (HCC)   • Cellulitis in diabetic foot (HCC)   • Other acute osteomyelitis of left foot (HCC)   • Osteomyelitis (HCC)   • Critical illness myopathy   • Staphylococcal arthritis of right knee (HCC)   • Other cirrhosis of liver (HCC)   • MRSA bacteremia   • Endocarditis of tricuspid valve   • Wound of right buttock   • History of osteomyelitis   • Debility       Past Medical History:   Diagnosis Date   • Diabetes mellitus (HCC)    • Hyperlipidemia    • Hypertension    • Pyogenic arthritis of right knee joint, due to unspecified organism (HCC) 2022       Past Surgical History:   Procedure Laterality Date   • ABSCESS DRAINAGE      PERINEUM   • AMPUTATION FOOT Left 2022    Procedure: AMPUTATION FOOT;  Surgeon: Addison Colby MD;  Location: [facility] OR;  Service: Podiatry;  Laterality: Left;   • INCISION AND DRAINAGE LEG Left 05/10/2022    Procedure: INCISION AND DRAINAGE LOWER EXTREMITY;  Surgeon: Addison Colby MD;  Location: [facility] OR;  Service: Podiatry;  Laterality: Left;   • KNEE INCISION AND DRAINAGE Right 2022    Procedure: KNEE INCISION AND DRAINAGE RIGHT;  Surgeon: Brain Bentley MD;  Location: [facility] OR;  Service: Orthopedics;  Laterality: Right;   • WOUND DEBRIDEMENT Left 2022    Procedure: DEBRIDEMENT FOOT;  Surgeon: Addison Colby MD;  Location: [facility] OR;  Service: Podiatry;  Laterality: Left;             IRF OT ASSESSMENT FLOWSHEET (last 12 hours)      IRF OT Evaluation and Treatment     [facility] Name 11/14/22 1400          OT Time and Intention    Document Type daily treatment  -     Mode of Treatment individual therapy;occupational therapy  -     Patient Effort good  -[facility] Name 11/14/22 1400          General Information    Patient/Family/Caregiver Comments/Observations patient agreeable to therapy. patient reports some back pain today. patient tolerated therapy well.  -     Existing Precautions/Restrictions fall  -[facility] Name 11/14/22 1400          Cognition/Psychosocial    Affect/Mental Status (Cognition) WFL  -[facility] Name 11/14/22 1400          Upper Body Dressing    [place] Level (Upper Body Dressing) don;pull over garment;minimum assist (75% or more patient effort)  -     Position (Upper Body Dressing) edge of bed sitting  -[facility] Name 11/14/22 1400          Lower Body Dressing    [place] Level (Lower Body Dressing) shoes/slippers;don;maximum assist (25% patient effort)  -[facility] Name 11/14/22 1400          Bed-Chair Transfer    Bed-Chair [place] (Transfers) minimum assist (75% patient effort)  -     Assistive Device (Bed-Chair Transfers) walker, front-wheeled  -[facility] Name 11/14/22 1400          Motor Skills    Motor Skills coordination;functional endurance  -     Therapeutic Exercise --  BUE ROM, gmc,fmc,bilateral coordination., dowel ex. handgripper ,  strengthening  -[facility] Name 11/14/22 1400          Positioning and Restraints    Post Treatment Position wheelchair  -     In Wheelchair with PT  -           User Key  (r) = Recorded By, (t) = Taken By, (c) = Cosigned By    Initials Name Effective Dates     Jazmin Goldman, OT 06/16/21 -                  Occupational Therapy Education     Title: PT OT SLP Therapies (Done)     Topic: Occupational Therapy (Done)     Point: ADL training (Done)     Description:   Instruct learner(s) on proper safety adaptation and remediation techniques during  self care or transfers.   Instruct in proper use of assistive devices.              Learning Progress Summary           Patient Acceptance, E,TB,D, VU,DU,NR by LA at 11/11/2022 1248    Acceptance, E, VU,NR by BF at 11/10/2022 1431    Acceptance, E, VU,NR by HB at 11/9/2022 1230    Acceptance, E,TB, VU by DG at 11/9/2022 0050    Acceptance, E, VU,NR by HB at 11/8/2022 1426    Acceptance, E,TB, VU by DG at 11/8/2022 0114    Acceptance, E, VU,NR by HB at 11/7/2022 1155    Acceptance, E,TB, VU by DG at 11/6/2022 2023    Acceptance, E, VU,NR by HB at 11/4/2022 1153    Acceptance, E, VU,NR by HB at 11/3/2022 1428    Acceptance, E,TB, VU by DG at 11/1/2022 2016    Acceptance, E,TB, VU by DG at 10/31/2022 2315    Acceptance, E, VU,NR by BF at 10/31/2022 1429    Acceptance, E,TB, VU by DL at 10/31/2022 0101    Acceptance, E,TB, VU by DL at 10/29/2022 2321    Acceptance, E, VU,NR by HB at 10/29/2022 1137                   Point: Home exercise program (Done)     Description:   Instruct learner(s) on appropriate technique for monitoring, assisting and/or progressing therapeutic exercises/activities.              Learning Progress Summary           Patient Acceptance, E,TB,D, VU,DU,NR by LA at 11/11/2022 1248    Acceptance, E, VU,NR by BF at 11/10/2022 1431    Acceptance, E, VU,NR by HB at 11/9/2022 1230    Acceptance, E,TB, VU by DG at 11/9/2022 0050    Acceptance, E, VU,NR by HB at 11/8/2022 1426    Acceptance, E,TB, VU by DG at 11/8/2022 0114    Acceptance, E, VU,NR by HB at 11/7/2022 1155    Acceptance, E,TB, VU by DG at 11/6/2022 2023    Acceptance, E, VU,NR by HB at 11/4/2022 1153    Acceptance, E, VU,NR by HB at 11/3/2022 1428    Acceptance, E,TB, VU by DG at 11/1/2022 2016    Acceptance, E,TB, VU by DG at 10/31/2022 2315    Acceptance, E,TB, VU by DL at 10/31/2022 0101    Acceptance, E,TB, VU by DL at 10/29/2022 2321    Acceptance, E, VU,NR by HB at 10/29/2022 1137                   Point: Precautions (Done)      Description:   Instruct learner(s) on prescribed precautions during self-care and functional transfers.              Learning Progress Summary           Patient Acceptance, E,TB,D, VU,DU,NR by LA at 11/11/2022 1248    Acceptance, E, VU,NR by HB at 11/9/2022 1230    Acceptance, E,TB, VU by DG at 11/9/2022 0050    Acceptance, E, VU,NR by HB at 11/8/2022 1426    Acceptance, E,TB, VU by DG at 11/8/2022 0114    Acceptance, E, VU,NR by HB at 11/7/2022 1155    Acceptance, E,TB, VU by DG at 11/6/2022 2023    Acceptance, E, VU,NR by HB at 11/4/2022 1153    Acceptance, E, VU,NR by HB at 11/3/2022 1428    Acceptance, E,TB, VU by DG at 11/1/2022 2016    Acceptance, E,TB, VU by DG at 10/31/2022 2315    Acceptance, E, VU,NR by BF at 10/31/2022 1429    Acceptance, E,TB, VU by DL at 10/31/2022 0101    Acceptance, E,TB, VU by DL at 10/29/2022 2321    Acceptance, E, VU,NR by HB at 10/29/2022 1137                   Point: Body mechanics (Done)     Description:   Instruct learner(s) on proper positioning and spine alignment during self-care, functional mobility activities and/or exercises.              Learning Progress Summary           Patient Acceptance, E,TB,D, VU,DU,NR by LA at 11/11/2022 1248    Acceptance, E, VU,NR by HB at 11/9/2022 1230    Acceptance, E,TB, VU by DG at 11/9/2022 0050    Acceptance, E, VU,NR by HB at 11/8/2022 1426    Acceptance, E,TB, VU by DG at 11/8/2022 0114    Acceptance, E, VU,NR by HB at 11/7/2022 1155    Acceptance, E,TB, VU by DG at 11/6/2022 2023    Acceptance, E, VU,NR by HB at 11/4/2022 1153    Acceptance, E, VU,NR by HB at 11/3/2022 1428    Acceptance, E,TB, VU by BRYSON at 11/1/2022 2016    Acceptance, E,TB, VU by BRYSON at 10/31/2022 2315    Acceptance, E,TB, VU by CORY at 10/31/2022 0101    Acceptance, E,TB, VU by CORY at 10/29/2022 2321    Acceptance, E, VU,NR by CLARITZA at 10/29/2022 1137                               User Key     Initials Effective Dates Name Provider Type Discipline    BRYSON 06/16/21 -  Otilio  Phyllis VALDES, RN Registered Nurse Nurse     06/16/21 -  Mandi Smith, OT Occupational Therapist OT    HB 05/25/21 -  Dorothy Rosas OT Occupational Therapist OT    LA 02/14/22 -  Jaqueline Bautista OT Occupational Therapist OT    DL 03/16/22 -  Hellen Mcdonnell, RN Registered Nurse Nurse                    OT Recommendation and Plan                         Time Calculation:      Time Calculation- OT     Row Name 11/14/22 1503 11/14/22 1502          Time Calculation- OT    OT Start Time 1245  - 0915  -     OT Stop Time 1330  -AH 1000  -     OT Time Calculation (min) 45 min  - 45 min  -           User Key  (r) = Recorded By, (t) = Taken By, (c) = Cosigned By    Initials Name Provider Type     Jazmin Goldman, PAYAM Occupational Therapist              Therapy Charges for Today     Code Description Service Date Service Provider Modifiers Qty    47928893551 HC OT THERAPEUTIC ACT EA 15 MIN 11/14/2022 Jazmin Goldman, OT GO 2    92805658170 HC OT SELF CARE/MGMT/TRAIN EA 15 MIN 11/14/2022 Jazmin Goldman OT GO 2    76190592936 HC OT THER PROC EA 15 MIN 11/14/2022 Jazmin Goldman OT GO 2                   Jazmin Goldman OT  11/14/2022

## 2022-11-14 NOTE — PROGRESS NOTES
Inpatient Rehabilitation Functional Measures Assessment and Plan of Care    Plan of Care  Self Care    [OT] Dressing (Lower)(Active)  Current Status(11/14/2022): min  Weekly Goal(11/22/2022): SBA  Discharge Goal: SBA    Functional Measures  SHANNON Eating:  SHANNON Grooming:  SHANNON Bathing:  SHANNON Upper Body Dressing:  SHANNON Lower Body Dressing:  SHANNON Toileting:    SHANNON Bladder Management  Level of Assistance:  Frequency/Number of Accidents this Shift:    SHANNON Bowel Management  Level of Assistance:  Frequency/Number of Accidents this Shift:    SHANNON Bed/Chair/Wheelchair Transfer:  SHANNON Toilet Transfer:  SHANNON Tub/Shower Transfer:    Previously Documented Mode of Locomotion at Discharge:  [facility] Expected Mode of Locomotion at Discharge:  SHANNON Walk/Wheelchair:  SHANNON Stairs:    [facility] Comprehension:  SHANNON Expression:  [facility] Social Interaction:  [facility] Problem Solving:  SHANNON Memory:    Therapy Mode Minutes  Occupational Therapy: Individual: 90 minutes.  Physical Therapy:  Speech Language Pathology:    Discharge Functional Goals:    Signed by: Lisa Goldman, Occupational Therapist

## 2022-11-14 NOTE — PROGRESS NOTES
[facility]  PROGRESS NOTE     Patient Identification:  Name:  Melchor Hardwick III  Age:  57 y.o.  Sex:  male  :  1965  MRN:  [id number]  Visit Number:  [id number]  ROOM: 109/     Primary Care Provider:  Missy Up APRN    Length of stay in inpatient status:  17    Subjective     Chief Compliant:  No chief complaint on file.      History of Presenting Illness: 57-year-old gentleman who is status post prolonged hospitalization with septic knee, bacteremia, MRSA suspected endocarditis.  Patient also being followed for diabetes mellitus.  His complaint of some back spasms today has no new complaints otherwise.    Objective     Current Hospital Meds:ascorbic acid, 500 mg, Oral, Daily  aspirin, 81 mg, Oral, Daily  DAPTOmycin, 6 mg/kg (Adjusted), Intravenous, Q24H  docusate sodium, 100 mg, Oral, BID  fludrocortisone, 50 mcg, Oral, Daily  fondaparinux, 2.5 mg, Subcutaneous, Q24H  Insulin Aspart, 0-14 Units, Subcutaneous, TID AC  insulin detemir, 18 Units, Subcutaneous, Q12H  iron polysaccharides, 150 mg, Oral, Daily  levothyroxine, 25 mcg, Oral, Q AM  midodrine, 10 mg, Oral, TID AC  multivitamin with minerals, 1 tablet, Oral, Daily  pantoprazole, 40 mg, Oral, Q AM  polyethylene glycol, 17 g, Oral, Daily  pregabalin, 100 mg, Oral, Q12H    Pharmacy Consult,       ----------------------------------------------------------------------------------------------------------------------  Vital Signs:  Temp:  [98.4 °F (36.9 °C)] 98.4 °F (36.9 °C)  Heart Rate:  [72-84] 72  Resp:  [18] 18  BP: (122-130)/(66-72) 122/70  SpO2:  [97 %] 97 %  on   ;      Body mass index is 28.34 kg/m².    Wt Readings from Last 3 Encounters:   22 89.6 kg (197 lb 8.5 oz)   22 98 kg (216 lb)   22 115 kg (254 lb)     Intake & Output (last 3 days)        0701   0700  0701   07 0701   07 0701  11/15 0700    P.O. 1320 1320 1200 360    Total Intake(mL/kg) 1320 (14.7) 1320  (14.7) 1200 (13.4) 360 (4)    Urine (mL/kg/hr) 2900 (1.3) 2650 (1.2) 3900 (1.8) 825 (2)    Stool 0 0 0     Total Output 2900 2650 3900 825    Net -1580 -1330 -2700 -465            Urine Unmeasured Occurrence 2 x       Stool Unmeasured Occurrence 1 x 2 x 1 x         Diet Regular; Thin; Consistent Carbohydrate  ----------------------------------------------------------------------------------------------------------------------  Physical exam:  Constitutional:   No acute distress  HEENT: Normocephalic atraumatic  Neck: Supple   Cardiovascular: Regular rate and rhythm  Pulmonary/Chest: Clear to auscultation  Abdominal: Positive bowel sounds soft.   Musculoskeletal: Status post right knee septic arthritis neuritis; left partial foot amputation  Neurological: No focal deficits  Skin: No rash  Peripheral vascular:  Genitourinary:  ----------------------------------------------------------------------------------------------------------------------    Last echocardiogram:  Results for orders placed during the hospital encounter of 10/28/22    Adult Transesophageal Echo (TYRESE) W/ Cont if Necessary Per Protocol    Interpretation Summary  •  Indication for TYRESE -to rule out infective endocarditis in a patient with MRSA bacteremia.  •  Findings-  •  Left ventricular systolic function is normal. Estimated left ventricular EF = 60%  •  Left atrial appendage is well visualized and is free of thrombus.  •  Saline test was negative for the evidence of shunt in interatrial septum.  •  The tricuspid valve appeared normal in structure. There was a moderate sized echodense structure seen above  the posterior tricuspid leaflet but not attached to the leaflet. The appearance and location are not typical for regurgitation. This structure could be a normal variant. No evidence of tricuspid valve stenosis or significant regurgitations.  •  Other valves also appear normal in structure with no evidence of stenosis or significant regurgitations.   No vegetations seen on these valves.  •  There is no evidence of pericardial effusion.  •  Comment-  •  In view of presence of MRSA bacteremia, recommend to extend antibiotic therapy for possible infective endocarditis and repeat TYRESE in 3 months.    ----------------------------------------------------------------------------------------------------------------------  Results from last 7 days   Lab Units 11/14/22 0226 11/13/22 0142 11/12/22 0139   CRP mg/dL 5.83*  --  9.69*   WBC 10*3/mm3 2.42* 2.83* 3.37*   HEMOGLOBIN g/dL 8.6* 8.9* 8.6*   HEMATOCRIT % 28.0* 28.3* 28.5*   MCV fL 84.3 82.7 85.1   MCHC g/dL 30.7* 31.4* 30.2*   PLATELETS 10*3/mm3 103* 96* 91*         Results from last 7 days   Lab Units 11/14/22  0226 11/12/22  0139 11/10/22  0102   SODIUM mmol/L 132* 136 137   POTASSIUM mmol/L 3.9 4.0 3.9   CHLORIDE mmol/L 98 101 103   CO2 mmol/L 25.7 24.8 23.7   BUN mg/dL 9 10 11   CREATININE mg/dL 0.68* 0.73* 0.62*   CALCIUM mg/dL 8.1* 8.2* 8.4*   GLUCOSE mg/dL 238* 118* 110*   Estimated Creatinine Clearance: 134.9 mL/min (A) (by C-G formula based on SCr of 0.68 mg/dL (L)).  No results found for: AMMONIA  Results from last 7 days   Lab Units 11/14/22 0226   CK TOTAL U/L 18*             Glucose   Date/Time Value Ref Range Status   11/14/2022 1051 231 (H) 70 - 130 mg/dL Final     Comment:     Meter: [id number] : [id number] NADJA RANGEL   11/14/2022 0602 163 (H) 70 - 130 mg/dL Final     Comment:     Meter: [id number] : [id number] YAMILET ANTAN   11/13/2022 1623 280 (H) 70 - 130 mg/dL Final     Comment:     RN Notified Meter: [id number] : [id number] DAYNE VILLANUEVA   11/13/2022 1150 226 (H) 70 - 130 mg/dL Final     Comment:     Meter: [id number] : [id number] Negrete Eunice   11/13/2022 0547 147 (H) 70 - 130 mg/dL Final     Comment:     Meter: [id number] : [id number] YAMILET GAMA   11/12/2022 1626 162 (H) 70 - 130 mg/dL Final     Comment:     RN Notified Meter: [id number] : [id number]  DAYNE VILLANUEVA   11/12/2022 1127 247 (H) 70 - 130 mg/dL Final     Comment:     Meter: [id number] : [id number] Christy Grant   11/12/2022 0615 72 70 - 130 mg/dL Final     Comment:     Meter: [id number] : [id number] Marcos Crystal     Lab Results   Component Value Date    TSH 2.390 10/10/2022    FREET4 0.87 (L) 10/10/2022     No results found for: PREGTESTUR, PREGSERUM, HCG, HCGQUANT  Pain Management Panel     Pain Management Panel Latest Ref Rng & Units 5/24/2022 5/11/2022    CREATININE UR mg/dL 91.0 -    AMPHETAMINES SCREEN, URINE Negative - Negative    BARBITURATES SCREEN Negative - Negative    BENZODIAZEPINE SCREEN, URINE Negative - Negative    BUPRENORPHINEUR Negative - Negative    COCAINE SCREEN, URINE Negative - Negative    METHADONE SCREEN, URINE Negative - Negative    METHAMPHETAMINEUR Negative - Negative        Brief Urine Lab Results  (Last result in the past 365 days)      Color   Clarity   Blood   Leuk Est   Nitrite   Protein   CREAT   Urine HCG        10/19/22 0746 Yellow   Clear   Negative   Negative   Negative   Negative               Blood Culture   Date Value Ref Range Status   11/12/2022 No growth at 2 days  Preliminary   11/12/2022 No growth at 2 days  Preliminary         No results found for: URINECX  No results found for: WOUNDCX  No results found for: STOOLCX  Results from last 7 days   Lab Units 11/14/22  0226 11/12/22  0139   CRP mg/dL 5.83* 9.69*       I have personally looked at the labs and they are summarized above.  ----------------------------------------------------------------------------------------------------------------------  Detailed radiology reports for the last 24 hours:    Imaging Results (Last 24 Hours)     ** No results found for the last 24 hours. **        Final impressions for the last 30 days of radiology reports:    Adult Transesophageal Echo (TYRESE) W/ Cont if Necessary Per Protocol    Addendum Date: 11/4/2022    •  Indication for TYRESE -to rule out infective  endocarditis in a patient with MRSA bacteremia. •  Findings- •  Left ventricular systolic function is normal. Estimated left ventricular EF = 60% •  Left atrial appendage is well visualized and is free of thrombus. •  Saline test was negative for the evidence of shunt in interatrial septum. •  The tricuspid valve appeared normal in structure. There was a moderate sized echodense structure seen above  the posterior tricuspid leaflet but not attached to the leaflet. The appearance and location are not typical for regurgitation. This structure could be a normal variant. No evidence of tricuspid valve stenosis or significant regurgitations. •  Other valves also appear normal in structure with no evidence of stenosis or significant regurgitations.  No vegetations seen on these valves. •  There is no evidence of pericardial effusion. •  Comment- •  In view of presence of MRSA bacteremia, recommend to extend antibiotic therapy for possible infective endocarditis and repeat TYRESE in 3 months.     Addendum Date: 11/3/2022    •  Indication for TYRESE -to rule out infective endocarditis in a patient with MRSA bacteremia. •  Findings- •  Left ventricular systolic function is normal. Estimated left ventricular EF = 60% •  Left atrial appendage is well visualized and is free of thrombus. •  Saline test was negative for the evidence of shunt in interatrial septum. •  The tricuspid valve appeared normal in structure. There was a moderate sized echodense structure seen above  the posterior tricuspid leaflet but not attached to the leaflet. The appearance and location are not typical for regurgitation. This structure could be a normal variant. No evidence of tricuspid valve stenosis or significant regurgitations. •  Other valves also appear normal in structure with no evidence of stenosis or significant regurgitations.  No vegetations seen on these valves. •  There is no evidence of pericardial effusion. •  Comment- •  In view of presence  of MRSA bacteremia, recommend to extend antibiotic therapy for infective endocarditis and repeat TYRESE in 3 months.     Addendum Date: 11/3/2022    •  Indication for TYRESE -to rule out infective endocarditis in a patient with MRSA bacteremia. •  Findings- •  Left ventricular systolic function is normal. Estimated left ventricular EF = 60% •  Left atrial appendage is well visualized and is free of thrombus. •  Saline test was negative for the evidence of shunt in interatrial septum. •  The tricuspid valve appeared normal in structure. There was a moderate sized echodense structure seen above  the posterior tricuspid leaflet but not attached to the leaflet. The appearance and location are not typical for regurgitation. This structure could be a normal variant. No evidence of tricuspid valve stenosis or significant regurgitations. •  Other valves also appear normal in structure with no evidence of stenosis or significant regurgitations.  No vegetations seen on these valves. •  There is no evidence of pericardial effusion.     Addendum Date: 11/3/2022    •  Indication for TYRESE -to rule out infective endocarditis in a patient with MRSA bacteremia. •  Findings- •  Left ventricular systolic function is normal. Estimated left ventricular EF = 60% •  Left atrial appendage is well visualized and is free of thrombus. •  Saline test was negative for the evidence of shunt in interatrial septum. •  The tricuspid valve appeared normal in structure. There was a moderate sized echodense structure seen in both the posterior tricuspid leaflet but not attached to the leaflet. The appearance and location are not typical for regurgitation. This structure could be a normal variant. No evidence of tricuspid valve stenosis or significant regurgitations. •  Other valves also appear normal in structure with no evidence of stenosis or significant regurgitations.  No vegetations seen on these valves. •  There is no evidence of pericardial effusion.      XR Chest 1 View    Result Date: 10/18/2022  Probable airspace disease in the right lung with low lung volumes. Right PICC line at the level of diaphragm.  Signer Name: Yaquelin Ferreira MD  Signed: 10/18/2022 9:00 PM  Workstation Name: [facility]  Radiology [facility] Venous Doppler Lower Extremity Right (duplex)    Result Date: 11/12/2022  No deep venous thrombus in the right lower extremity. Signer Name: Thomas Day MD  Signed: 11/12/2022 11:34 AM  Workstation Name: [facility]  Radiology [facility] Venous Doppler Upper Extremity Bilateral (duplex)    Result Date: 11/12/2022  Negative examination.  No evidence of bilateral upper extremity venous thrombosis. Signer Name: Thomas Day MD  Signed: 11/12/2022 11:33 AM  Workstation Name: [facility]  Radiology Psychiatric    I have personally looked at the radiology images and read the final radiology report.    Assessment & Plan    Debility--to resume PT and OT today.    Orthostatic hypotension much improved.  Patient tolerating therapy extremely well at this point.  Patient on Florinef and midodrine    Cirrhosis secondary to [place]    Recent septic knee arthritis, back MRSA bacteremia, possible MRSA associated endocarditis--patient to complete daptomycin course on November 21.  Plan on TYRESE at that time.    Diabetes mellitus we will continue current treatment    Leukopenia/neutropenia likely secondary to cirrhosis.        VTE Prophylaxis:   Mechanical Order History:     None      Pharmalogical Order History:      Ordered     Dose Route Frequency Stop    10/28/22 0592  fondaparinux (ARIXTRA) injection 2.5 mg         2.5 mg SC Every 24 Hours Scheduled --                    Joe Mike MD  [facility]  11/14/22  11:42 EST

## 2022-11-15 LAB
GLUCOSE BLDC GLUCOMTR-MCNC: 180 MG/DL (ref 70–130)
GLUCOSE BLDC GLUCOMTR-MCNC: 215 MG/DL (ref 70–130)
GLUCOSE BLDC GLUCOMTR-MCNC: 222 MG/DL (ref 70–130)

## 2022-11-15 PROCEDURE — 97535 SELF CARE MNGMENT TRAINING: CPT

## 2022-11-15 PROCEDURE — 99231 SBSQ HOSP IP/OBS SF/LOW 25: CPT | Performed by: FAMILY MEDICINE

## 2022-11-15 PROCEDURE — 25010000002 FONDAPARINUX PER 0.5 MG: Performed by: FAMILY MEDICINE

## 2022-11-15 PROCEDURE — 97530 THERAPEUTIC ACTIVITIES: CPT

## 2022-11-15 PROCEDURE — 63710000001 INSULIN DETEMIR PER 5 UNITS: Performed by: INTERNAL MEDICINE

## 2022-11-15 PROCEDURE — 97116 GAIT TRAINING THERAPY: CPT

## 2022-11-15 PROCEDURE — 63710000001 INSULIN ASPART PER 5 UNITS: Performed by: INTERNAL MEDICINE

## 2022-11-15 PROCEDURE — 97110 THERAPEUTIC EXERCISES: CPT

## 2022-11-15 PROCEDURE — 82962 GLUCOSE BLOOD TEST: CPT

## 2022-11-15 PROCEDURE — 25010000002 DAPTOMYCIN PER 1 MG: Performed by: INTERNAL MEDICINE

## 2022-11-15 RX ORDER — METHOCARBAMOL 750 MG/1
750 TABLET, FILM COATED ORAL EVERY 6 HOURS SCHEDULED
Status: DISCONTINUED | OUTPATIENT
Start: 2022-11-15 | End: 2022-11-16

## 2022-11-15 RX ADMIN — CYCLOBENZAPRINE 5 MG: 10 TABLET, FILM COATED ORAL at 05:52

## 2022-11-15 RX ADMIN — HYDROCODONE BITARTRATE AND ACETAMINOPHEN 1 TABLET: 5; 325 TABLET ORAL at 09:07

## 2022-11-15 RX ADMIN — INSULIN ASPART 5 UNITS: 100 INJECTION, SOLUTION INTRAVENOUS; SUBCUTANEOUS at 12:36

## 2022-11-15 RX ADMIN — DOCUSATE SODIUM 100 MG: 100 CAPSULE ORAL at 08:58

## 2022-11-15 RX ADMIN — INSULIN DETEMIR 18 UNITS: 100 INJECTION, SOLUTION SUBCUTANEOUS at 09:08

## 2022-11-15 RX ADMIN — LEVOTHYROXINE SODIUM 25 MCG: 0.07 TABLET ORAL at 05:47

## 2022-11-15 RX ADMIN — CARBIDOPA AND LEVODOPA 10 MG: 50; 200 TABLET, EXTENDED RELEASE ORAL at 17:43

## 2022-11-15 RX ADMIN — Medication 150 MG: at 08:58

## 2022-11-15 RX ADMIN — POLYETHYLENE GLYCOL (3350) 17 G: 17 POWDER, FOR SOLUTION ORAL at 08:59

## 2022-11-15 RX ADMIN — INSULIN DETEMIR 18 UNITS: 100 INJECTION, SOLUTION SUBCUTANEOUS at 21:47

## 2022-11-15 RX ADMIN — OXYCODONE HYDROCHLORIDE AND ACETAMINOPHEN 500 MG: 500 TABLET ORAL at 08:58

## 2022-11-15 RX ADMIN — DAPTOMYCIN 500 MG: 500 INJECTION, POWDER, LYOPHILIZED, FOR SOLUTION INTRAVENOUS at 18:03

## 2022-11-15 RX ADMIN — FLUDROCORTISONE ACETATE 50 MCG: 0.1 TABLET ORAL at 08:58

## 2022-11-15 RX ADMIN — INSULIN ASPART 5 UNITS: 100 INJECTION, SOLUTION INTRAVENOUS; SUBCUTANEOUS at 17:43

## 2022-11-15 RX ADMIN — INSULIN ASPART 3 UNITS: 100 INJECTION, SOLUTION INTRAVENOUS; SUBCUTANEOUS at 09:06

## 2022-11-15 RX ADMIN — CARBIDOPA AND LEVODOPA 10 MG: 50; 200 TABLET, EXTENDED RELEASE ORAL at 12:37

## 2022-11-15 RX ADMIN — PANTOPRAZOLE SODIUM 40 MG: 40 TABLET, DELAYED RELEASE ORAL at 05:47

## 2022-11-15 RX ADMIN — FONDAPARINUX SODIUM 2.5 MG: 2.5 INJECTION, SOLUTION SUBCUTANEOUS at 08:59

## 2022-11-15 RX ADMIN — PREGABALIN 100 MG: 100 CAPSULE ORAL at 09:07

## 2022-11-15 RX ADMIN — METHOCARBAMOL 750 MG: 750 TABLET, FILM COATED ORAL at 17:43

## 2022-11-15 RX ADMIN — CARBIDOPA AND LEVODOPA 10 MG: 50; 200 TABLET, EXTENDED RELEASE ORAL at 06:39

## 2022-11-15 RX ADMIN — ASPIRIN 81 MG: 81 TABLET, COATED ORAL at 08:58

## 2022-11-15 RX ADMIN — PREGABALIN 100 MG: 100 CAPSULE ORAL at 21:47

## 2022-11-15 RX ADMIN — Medication 1 TABLET: at 08:59

## 2022-11-15 NOTE — PLAN OF CARE
Goal Outcome Evaluation:  Plan of Care Reviewed With: patient resting in bed has no complaints or problems at present, will continue to monitor.

## 2022-11-15 NOTE — THERAPY TREATMENT NOTE
Inpatient Rehabilitation - Occupational Therapy Treatment Note    YVONNE Gaines     Patient Name: Melchor Hardwick III  : 1965  MRN: [id number]    Today's Date: 11/15/2022                 Admit Date: 10/28/2022         ICD-10-CM ICD-9-CM   1. Staphylococcal arthritis of right knee (HCC)  M00.061 711.06     041.10       Patient Active Problem List   Diagnosis   • Hyperlipidemia   • Hypertensive disorder   • Obesity   • Type 2 diabetes mellitus with hyperglycemia, with long-term current use of insulin (HCC)   • Gas gangrene of foot (HCC)   • Cellulitis in diabetic foot (HCC)   • Other acute osteomyelitis of left foot (HCC)   • Osteomyelitis (HCC)   • Critical illness myopathy   • Staphylococcal arthritis of right knee (HCC)   • Other cirrhosis of liver (HCC)   • MRSA bacteremia   • Endocarditis of tricuspid valve   • Wound of right buttock   • History of osteomyelitis   • Debility       Past Medical History:   Diagnosis Date   • Diabetes mellitus (HCC)    • Hyperlipidemia    • Hypertension    • Pyogenic arthritis of right knee joint, due to unspecified organism (HCC) 2022       Past Surgical History:   Procedure Laterality Date   • ABSCESS DRAINAGE      PERINEUM   • AMPUTATION FOOT Left 2022    Procedure: AMPUTATION FOOT;  Surgeon: Addison Colby MD;  Location: [facility] OR;  Service: Podiatry;  Laterality: Left;   • INCISION AND DRAINAGE LEG Left 05/10/2022    Procedure: INCISION AND DRAINAGE LOWER EXTREMITY;  Surgeon: Addison Colby MD;  Location: [facility] OR;  Service: Podiatry;  Laterality: Left;   • KNEE INCISION AND DRAINAGE Right 2022    Procedure: KNEE INCISION AND DRAINAGE RIGHT;  Surgeon: Brain Bentley MD;  Location: [facility] OR;  Service: Orthopedics;  Laterality: Right;   • WOUND DEBRIDEMENT Left 2022    Procedure: DEBRIDEMENT FOOT;  Surgeon: Addison Colby MD;  Location: [facility] OR;  Service: Podiatry;  Laterality: Left;             IRF OT ASSESSMENT FLOWSHEET (last 12 hours)      IRF OT Evaluation and Treatment     Row Name 11/15/22 1230          OT Time and Intention    Document Type daily treatment  -HB     Mode of Treatment individual therapy;occupational therapy  -HB     Total Minutes, Occupational Therapy 90  -HB     Patient Effort good  -HB     Symptoms Noted During/After Treatment increased pain  RN notified ; pt c/o back pain  -HB     Row Name 11/15/22 1230          General Information    Patient Profile Reviewed yes  -HB     Patient/Family/Caregiver Comments/Observations Patient and RN okd OT this date.  -HB     General Observations of Patient Patient tolerated OT well this date with no adverse reactions. Pt verbalzied increased pain in back with movement. RN notified.  -HB     Existing Precautions/Restrictions fall;brace worn when out of bed  -HB     Row Name 11/15/22 1230          Pain Assessment    Pretreatment Pain Rating 6/10  -HB     Posttreatment Pain Rating 6/10  -HB     Pain Location - back  -HB     [facility] Name 11/15/22 1230          Cognition/Psychosocial    Affect/Mental Status (Cognition) WFL  -HB     Orientation Status (Cognition) oriented x 4  -HB     Follows Commands (Cognition) WFL  -HB     [facility] Name 11/15/22 1230          Lower Body Dressing    [place] Level (Lower Body Dressing) shoes/slippers;don;moderate assist (50% patient effort)  -HB     Position (Lower Body Dressing) supine  -HB     Row Name 11/15/22 1230          Grooming    [place] Level (Grooming) grooming skills;set up  -HB     Position (Grooming) supported sitting  -HB     [facility] Name 11/15/22 1230          Self-Feeding    [place] Level (Self-Feeding) feeding skills;set up  -HB     Position (Self-Feeding) supported sitting  -HB     [facility] Name 11/15/22 1230          Bed Mobility    Supine-Sit [place] (Bed Mobility) nonverbal cues (demo/gesture);verbal cues;standby assist  -[facility] Name 11/15/22 1230          Bed-Chair Transfer    Bed-Chair [place] (Transfers) minimum assist (75% patient  effort);nonverbal cues (demo/gesture);verbal cues  -     Assistive Device (Bed-Chair Transfers) walker, front-wheeled  -HB     Row Name 11/15/22 1230          Sit-Stand Transfer    Sit-Stand [place] (Transfers) minimum assist (75% patient effort);nonverbal cues (demo/gesture);verbal cues  -     Assistive Device (Sit-Stand Transfers) walker, front-wheeled  -HB     Row Name 11/15/22 1230          Stand-Sit Transfer    Stand-Sit [place] (Transfers) verbal cues;nonverbal cues (demo/gesture);minimum assist (75% patient effort)  -     Assistive Device (Stand-Sit Transfers) wheelchair;walker, front-wheeled  -HB     Row Name 11/15/22 1230          Motor Skills    Motor Skills coordination;functional endurance;motor control/coordination interventions  -     Motor Control/Coordination Interventions fine motor manipulation/dexterity activities;therapeutic exercise/ROM;gross motor coordination activities  -     Therapeutic Exercise shoulder;elbow/forearm;wrist;hand  -HB     Additional Documentation --  wrist rolls; BUE TA to increase ADL status/ endurance; rest between tasks.  -     Row Name 11/15/22 1230          Positioning and Restraints    Pre-Treatment Position in bed  -HB     Post Treatment Position wheelchair  -HB     In Bed with PT  -HB           User Key  (r) = Recorded By, (t) = Taken By, (c) = Cosigned By    Initials Name Effective Dates     Dorothy Rosas, OT 05/25/21 -                  Occupational Therapy Education     Title: PT OT SLP Therapies (Done)     Topic: Occupational Therapy (Done)     Point: ADL training (Done)     Description:   Instruct learner(s) on proper safety adaptation and remediation techniques during self care or transfers.   Instruct in proper use of assistive devices.              Learning Progress Summary           Patient Acceptance, E, VU,NR by CLARITZA at 11/15/2022 1242    Acceptance, E,TB, VU by CORY at 11/15/2022 0305    Acceptance, E,TB,D, VU,DU,NR by LA at 11/11/2022  1248    Acceptance, E, VU,NR by BF at 11/10/2022 1431    Acceptance, E, VU,NR by HB at 11/9/2022 1230    Acceptance, E,TB, VU by DG at 11/9/2022 0050    Acceptance, E, VU,NR by HB at 11/8/2022 1426    Acceptance, E,TB, VU by DG at 11/8/2022 0114    Acceptance, E, VU,NR by HB at 11/7/2022 1155    Acceptance, E,TB, VU by DG at 11/6/2022 2023    Acceptance, E, VU,NR by HB at 11/4/2022 1153    Acceptance, E, VU,NR by HB at 11/3/2022 1428    Acceptance, E,TB, VU by DG at 11/1/2022 2016    Acceptance, E,TB, VU by DG at 10/31/2022 2315    Acceptance, E, VU,NR by BF at 10/31/2022 1429    Acceptance, E,TB, VU by DL at 10/31/2022 0101    Acceptance, E,TB, VU by DL at 10/29/2022 2321    Acceptance, E, VU,NR by HB at 10/29/2022 1137                   Point: Home exercise program (Done)     Description:   Instruct learner(s) on appropriate technique for monitoring, assisting and/or progressing therapeutic exercises/activities.              Learning Progress Summary           Patient Acceptance, E, VU,NR by HB at 11/15/2022 1242    Acceptance, E,TB, VU by DL at 11/15/2022 0305    Acceptance, E,TB,D, VU,DU,NR by LA at 11/11/2022 1248    Acceptance, E, VU,NR by BF at 11/10/2022 1431    Acceptance, E, VU,NR by HB at 11/9/2022 1230    Acceptance, E,TB, VU by DG at 11/9/2022 0050    Acceptance, E, VU,NR by HB at 11/8/2022 1426    Acceptance, E,TB, VU by DG at 11/8/2022 0114    Acceptance, E, VU,NR by HB at 11/7/2022 1155    Acceptance, E,TB, VU by DG at 11/6/2022 2023    Acceptance, E, VU,NR by HB at 11/4/2022 1153    Acceptance, E, VU,NR by HB at 11/3/2022 1428    Acceptance, E,TB, VU by DG at 11/1/2022 2016    Acceptance, E,TB, VU by DG at 10/31/2022 2315    Acceptance, E,TB, VU by DL at 10/31/2022 0101    Acceptance, E,TB, VU by DL at 10/29/2022 2321    Acceptance, E, VU,NR by HB at 10/29/2022 1137                   Point: Precautions (Done)     Description:   Instruct learner(s) on prescribed precautions during self-care and  functional transfers.              Learning Progress Summary           Patient Acceptance, E, VU,NR by HB at 11/15/2022 1242    Acceptance, E,TB, VU by DL at 11/15/2022 0305    Acceptance, E,TB,D, VU,DU,NR by LA at 11/11/2022 1248    Acceptance, E, VU,NR by HB at 11/9/2022 1230    Acceptance, E,TB, VU by DG at 11/9/2022 0050    Acceptance, E, VU,NR by HB at 11/8/2022 1426    Acceptance, E,TB, VU by DG at 11/8/2022 0114    Acceptance, E, VU,NR by HB at 11/7/2022 1155    Acceptance, E,TB, VU by DG at 11/6/2022 2023    Acceptance, E, VU,NR by HB at 11/4/2022 1153    Acceptance, E, VU,NR by HB at 11/3/2022 1428    Acceptance, E,TB, VU by DG at 11/1/2022 2016    Acceptance, E,TB, VU by DG at 10/31/2022 2315    Acceptance, E, VU,NR by BF at 10/31/2022 1429    Acceptance, E,TB, VU by DL at 10/31/2022 0101    Acceptance, E,TB, VU by DL at 10/29/2022 2321    Acceptance, E, VU,NR by HB at 10/29/2022 1137                   Point: Body mechanics (Done)     Description:   Instruct learner(s) on proper positioning and spine alignment during self-care, functional mobility activities and/or exercises.              Learning Progress Summary           Patient Acceptance, E, VU,NR by HB at 11/15/2022 1242    Acceptance, E,TB, VU by DL at 11/15/2022 0305    Acceptance, E,TB,D, VU,DU,NR by LA at 11/11/2022 1248    Acceptance, E, VU,NR by HB at 11/9/2022 1230    Acceptance, E,TB, VU by DG at 11/9/2022 0050    Acceptance, E, VU,NR by HB at 11/8/2022 1426    Acceptance, E,TB, VU by DG at 11/8/2022 0114    Acceptance, E, VU,NR by HB at 11/7/2022 1155    Acceptance, E,TB, VU by DG at 11/6/2022 2023    Acceptance, E, VU,NR by HB at 11/4/2022 1153    Acceptance, E, VU,NR by HB at 11/3/2022 1428    Acceptance, E,TB, VU by DG at 11/1/2022 2016    Acceptance, E,TB, VU by DG at 10/31/2022 2315    Acceptance, E,TB, VU by DL at 10/31/2022 0101    Acceptance, E,TB, VU by DL at 10/29/2022 2321    Acceptance, E, VU,NR by HB at 10/29/2022 1137                                User Key     Initials Effective Dates Name Provider Type Discipline    DG 06/16/21 -  Phyllis Yañez, RN Registered Nurse Nurse    BF 06/16/21 -  Mandi Smith OT Occupational Therapist OT    HB 05/25/21 -  Dorothy Rosas OT Occupational Therapist OT    LA 02/14/22 -  Jaqueline Bautista OT Occupational Therapist OT    DL 03/16/22 -  Hellen Mcdonnell, RN Registered Nurse Nurse                    OT Recommendation and Plan    Planned Therapy Interventions (OT): activity tolerance training, adaptive equipment training, BADL retraining, IADL retraining, patient/caregiver education/training, strengthening exercise, transfer/mobility retraining                    Time Calculation:      Time Calculation- OT     Row Name 11/15/22 1243             Time Calculation- OT    OT Start Time 0830  -HB      OT Stop Time 1000  -HB      OT Time Calculation (min) 90 min  -HB      Total Timed Code Minutes- OT 90 minute(s)  -HB      OT Non-Billable Time (min) 10 min  -HB            User Key  (r) = Recorded By, (t) = Taken By, (c) = Cosigned By    Initials Name Provider Type     Dorothy Rosas, OT Occupational Therapist              Therapy Charges for Today     Code Description Service Date Service Provider Modifiers Qty    64993092960 HC OT SELF CARE/MGMT/TRAIN EA 15 MIN 11/15/2022 Dorothy Rosas OT GO 2    63562783993 HC OT THER PROC EA 15 MIN 11/15/2022 Dorothy Rosas OT GO 2    35794444945 HC OT THERAPEUTIC ACT EA 15 MIN 11/15/2022 Dorothy Rosas OT GO 2                   Dorothy Rosas OT  11/15/2022

## 2022-11-15 NOTE — THERAPY TREATMENT NOTE
Inpatient Rehabilitation - Physical Therapy Treatment Note       YVONNE Gaines     Patient Name: Melchor Hardwick III  : 1965  MRN: [id number]    Today's Date: 11/15/2022                    Admit Date: 10/28/2022      Visit Dx:     ICD-10-CM ICD-9-CM   1. Staphylococcal arthritis of right knee (HCC)  M00.061 711.06     041.10       Patient Active Problem List   Diagnosis   • Hyperlipidemia   • Hypertensive disorder   • Obesity   • Type 2 diabetes mellitus with hyperglycemia, with long-term current use of insulin (HCC)   • Gas gangrene of foot (HCC)   • Cellulitis in diabetic foot (HCC)   • Other acute osteomyelitis of left foot (HCC)   • Osteomyelitis (HCC)   • Critical illness myopathy   • Staphylococcal arthritis of right knee (HCC)   • Other cirrhosis of liver (HCC)   • MRSA bacteremia   • Endocarditis of tricuspid valve   • Wound of right buttock   • History of osteomyelitis   • Debility       Past Medical History:   Diagnosis Date   • Diabetes mellitus (HCC)    • Hyperlipidemia    • Hypertension    • Pyogenic arthritis of right knee joint, due to unspecified organism (HCC) 2022       Past Surgical History:   Procedure Laterality Date   • ABSCESS DRAINAGE      PERINEUM   • AMPUTATION FOOT Left 2022    Procedure: AMPUTATION FOOT;  Surgeon: Addison Colby MD;  Location: [facility] OR;  Service: Podiatry;  Laterality: Left;   • INCISION AND DRAINAGE LEG Left 05/10/2022    Procedure: INCISION AND DRAINAGE LOWER EXTREMITY;  Surgeon: Addison Colby MD;  Location:  [facility] OR;  Service: Podiatry;  Laterality: Left;   • KNEE INCISION AND DRAINAGE Right 2022    Procedure: KNEE INCISION AND DRAINAGE RIGHT;  Surgeon: Brain Bentley MD;  Location: [facility] OR;  Service: Orthopedics;  Laterality: Right;   • WOUND DEBRIDEMENT Left 2022    Procedure: DEBRIDEMENT FOOT;  Surgeon: Addison Colby MD;  Location: [facility] OR;  Service: Podiatry;  Laterality: Left;       PT ASSESSMENT (last 12 hours)     IRF PT  Evaluation and Treatment     Row Name 11/15/22 1537          PT Time and Intention    Document Type daily treatment  -RF     Mode of Treatment individual therapy;physical therapy  -RF     Patient/Family/Caregiver Comments/Observations Pt reports significant low back pain and R knee pain this date.  -RF     Row Name 11/15/22 1537          General Information    Patient Profile Reviewed yes  -RF     Existing Precautions/Restrictions fall;brace worn when out of bed  air cast LLE  -RF     Row Name 11/15/22 1537          Cognition/Psychosocial    Affect/Mental Status (Cognition) WFL  -RF     Follows Commands (Cognition) WFL  -RF     Personal Safety Interventions gait belt;nonskid shoes/slippers when out of bed;fall prevention program maintained  -RF     Cognitive Function WFL  -RF     Row Name 11/15/22 1537          Bed Mobility    Bed Mobility bed mobility (all) activities  -RF     All Activities, [place] (Bed Mobility) modified independence  -RF     Assistive Device (Bed Mobility) bed rails  -RF     Row Name 11/15/22 1537          Transfer Assessment/Treatment    Transfers bed-chair transfer;chair-bed transfer;sit-stand transfer;stand-sit transfer;car transfer  -RF     Row Name 11/15/22 1537          Bed-Chair Transfer    Bed-Chair [place] (Transfers) contact guard;standby assist  -RF     Assistive Device (Bed-Chair Transfers) walker, front-wheeled  -RF     Row Name 11/15/22 1537          Chair-Bed Transfer    Chair-Bed [place] (Transfers) contact guard;standby assist  -RF     Assistive Device (Chair-Bed Transfers) wheelchair;walker, front-wheeled  -RF     Row Name 11/15/22 1537          Sit-Stand Transfer    Sit-Stand [place] (Transfers) contact guard;standby assist  -RF     Assistive Device (Sit-Stand Transfers) walker, front-wheeled  -RF     Row Name 11/15/22 1537          Stand-Sit Transfer    Stand-Sit [place] (Transfers) contact guard;standby assist  -RF     Assistive Device (Stand-Sit  Transfers) wheelchair;walker, front-wheeled  -RF     Row Name 11/15/22 1537          Gait/Stairs (Locomotion)    Gait/Stairs Locomotion gait/ambulation independence;gait/ambulation assistive device;distance ambulated  -RF     [place] Level (Gait) standby assist  -RF     Assistive Device (Gait) walker, front-wheeled  -RF     Distance in Feet (Gait) 120  -RF     Pattern (Gait) step-to;step-through  -RF     Bilateral Gait Deviations forward flexed posture  -RF     Right Sided Gait Deviations heel strike decreased;weight shift ability decreased  -RF     Comment, (Gait/Stairs) Increased knee flexion noted bilaterally  -RF     Row Name 11/15/22 1537          Safety Issues, Functional Mobility    Impairments Affecting Function (Mobility) balance;endurance/activity tolerance;pain;range of motion (ROM);postural/trunk control  -RF     Row Name 11/15/22 1537          Hip (Therapeutic Exercise)    Hip Strengthening (Therapeutic Exercise) bilateral;flexion;extension;aBduction;aDduction;heel slides;marching while seated;marching while standing;mini squats;sitting;standing;2 lb free weight;resistance band;blue;2 sets;10 repetitions  -RF     Row Name 11/15/22 1537          Knee (Therapeutic Exercise)    Knee AAROM (Therapeutic Exercise) right;flexion;extension;sitting;2 sets;10 repetitions;other (see comments)  KNee extension in long sitting with OP, knee flexion with skateboard with OP  -RF     Knee Strengthening (Therapeutic Exercise) bilateral;flexion;extension;heel slides;marching while seated;marching while standing;LAQ (long arc quad);hamstring curls;sitting;2 lb free weight;resistance band;blue;2 sets;10 repetitions  -RF     Row Name 11/15/22 1537          Ankle (Therapeutic Exercise)    Ankle Strengthening (Therapeutic Exercise) bilateral;dorsiflexion;plantarflexion;sitting;2 lb free weight;2 sets;10 repetitions  -RF     Row Name 11/15/22 1537          Positioning and Restraints    Pre-Treatment Position sitting in  chair/recliner  -RF     Post Treatment Position bed  -RF     In Bed supine;call light within reach;encouraged to call for assist  -RF     Row Name 11/15/22 1537          Daily Progress Summary (PT)    Functional Goal Overall Progress (PT) progressing toward functional goals as expected  -RF     Daily Progress Summary (PT) Acitivity hindered due to increased LBP with exertion. R knee ROM deficits continually noted in both flexion and extension. Continued skilled care required for further improvements to ensure maximum safe function upon D/C.  -RF     Impairments Still Limiting Function (PT) functional activity tolerance impairment;pain;range of motion deficit;strength deficit  -RF     Recommendations (PT) Continue per current POC  -RF     Row Name 11/15/22 1537          IRF PT Goals    Bed Mobility Goal Selection (PT-IRF) bed mobility, PT goal 1  -RF     Transfer Goal Selection (PT-IRF) transfers, PT goal 1  -RF     Gait (Walking Locomotion) Goal Selection (PT-IRF) gait, PT goal 1  -RF     Row Name 11/15/22 1537          Bed Mobility Goal 1 (PT-IRF)    Activity/Assistive Device (Bed Mobility Goal 1, PT-IRF) bed mobility activities, all  -RF     [place] Level (Bed Mobility Goal 1, PT-IRF) independent  -RF     Time Frame (Bed Mobility Goal 1, PT-IRF) long-term goal (LTG)  -RF     Progress/Outcomes (Bed Mobility Goal 1, PT-IRF) goal met  -RF     Row Name 11/15/22 1537          Transfer Goal 1 (PT-IRF)    Activity/Assistive Device (Transfer Goal 1, PT-IRF) sit-to-stand/stand-to-sit;bed-to-chair/chair-to-bed;walker, rolling  -RF     [place] Level (Transfer Goal 1, PT-IRF) modified independence  -RF     Time Frame (Transfer Goal 1, PT-IRF) long-term goal (LTG)  -RF     Progress/Outcomes (Transfer Goal 1, PT-IRF) new goal  -RF     Row Name 11/15/22 1537          Gait/Walking Locomotion Goal 1 (PT-IRF)    Activity/Assistive Device (Gait/Walking Locomotion Goal 1, PT-IRF) gait (walking locomotion);assistive device  use  -RF     Gait/Walking Locomotion Distance Goal 1 (PT-IRF) 100'  -RF     [place] Level (Gait/Walking Locomotion Goal 1, PT-IRF) modified independence  -RF     Time Frame (Gait/Walking Locomotion Goal 1, PT-IRF) long-term goal (LTG)  -RF     Progress/Outcomes (Gait/Walking Locomotion Goal 1, PT-IRF) goal revised this date  -RF           User Key  (r) = Recorded By, (t) = Taken By, (c) = Cosigned By    Initials Name Provider Type    RF Meron Lei, PTA Physical Therapist Assistant              Wound 09/21/22 1532 Right anterior knee Incision (Active)   Dressing Appearance open to air 11/15/22 0907   Base dry;clean 11/15/22 0907   Periwound Temperature warm 11/15/22 0907   Drainage Amount none 11/15/22 0907   Dressing Care open to air 11/15/22 0907       Wound 10/28/22 1827 Right coccyx Pressure Injury (Active)   Dressing Appearance open to air 11/14/22 2051   Base red;non-blanchable;blanchable 11/15/22 0907   Drainage Amount none 11/15/22 0907   Care, Wound barrier applied 11/15/22 0907   Dressing Care open to air 11/15/22 0907     Physical Therapy Education     Title: PT OT SLP Therapies (Done)     Topic: Physical Therapy (Done)     Point: Mobility training (Done)     Learning Progress Summary           Patient Acceptance, E,TB, VU by RF at 11/15/2022 1541    Acceptance, E,TB, VU by DL at 11/15/2022 0305    Acceptance, E,TB, VU by RF at 11/14/2022 1528    Acceptance, E,TB, VU by RF at 11/10/2022 1551    Acceptance, E,TB, VU by RF at 11/9/2022 1622    Acceptance, E,TB, VU by DG at 11/9/2022 0050    Acceptance, E,TB, VU by RF at 11/8/2022 1539    Acceptance, E,TB, VU by DG at 11/8/2022 0114    Acceptance, E,TB, VU by RF at 11/7/2022 1607    Acceptance, E,TB, VU by DG at 11/6/2022 2023    Acceptance, E,TB, VU by RF at 11/4/2022 1551    Acceptance, E,TB, VU,DU by HR at 11/3/2022 1541    Acceptance, CECILIO [place] VU,NR by RG at 11/3/2022 1442    Acceptance, BERTIN [place] VU by DG at 11/1/2022 2016    Acceptance, CECILIO [place] VU,NR  by RG at 11/1/2022 1541    Acceptance, E,TB, VU by DG at 10/31/2022 2315    Acceptance, E,D, VU,NR by RG at 10/31/2022 1446                   Point: Home exercise program (Done)     Learning Progress Summary           Patient Acceptance, E,TB, VU by RF at 11/15/2022 1541    Acceptance, E,TB, VU by DL at 11/15/2022 0305    Acceptance, E,TB, VU by RF at 11/14/2022 1528    Acceptance, E,TB, VU by RF at 11/10/2022 1551    Acceptance, E,TB, VU by RF at 11/9/2022 1622    Acceptance, E,TB, VU by DG at 11/9/2022 0050    Acceptance, E,TB, VU by RF at 11/8/2022 1539    Acceptance, E,TB, VU by DG at 11/8/2022 0114    Acceptance, E,TB, VU by RF at 11/7/2022 1607    Acceptance, E,TB, VU by DG at 11/6/2022 2023    Acceptance, E,TB, VU by RF at 11/4/2022 1551    Acceptance, E,TB, VU,DU by HR at 11/3/2022 1541    Acceptance, E,D, VU,NR by RG at 11/3/2022 1442    Acceptance, E,TB, VU by DG at 11/1/2022 2016    Acceptance, E,D, VU,NR by RG at 11/1/2022 1541    Acceptance, E,TB, VU by DG at 10/31/2022 2315    Acceptance, E,D, VU,NR by RG at 10/31/2022 1446                   Point: Body mechanics (Done)     Learning Progress Summary           Patient Acceptance, E,TB, VU by RF at 11/15/2022 1541    Acceptance, E,TB, VU by DL at 11/15/2022 0305    Acceptance, E,TB, VU by RF at 11/14/2022 1528    Acceptance, E,TB, VU by RF at 11/10/2022 1551    Acceptance, E,TB, VU by RF at 11/9/2022 1622    Acceptance, E,TB, VU by DG at 11/9/2022 0050    Acceptance, E,TB, VU by RF at 11/8/2022 1539    Acceptance, E,TB, VU by DG at 11/8/2022 0114    Acceptance, E,TB, VU by RF at 11/7/2022 1607    Acceptance, E,TB, VU by DG at 11/6/2022 2023    Acceptance, E,TB, VU by RF at 11/4/2022 1551    Acceptance, E,TB, VU,DU by HR at 11/3/2022 1541    Acceptance, E,D, VU,NR by RG at 11/3/2022 1442    Acceptance, E,TB, VU by DG at 11/1/2022 2016    Acceptance, E,D, VU,NR by RG at 11/1/2022 1541    Acceptance, E,TB, VU by DG at 10/31/2022 2315    Acceptance, E,D,  VU,NR by RG at 10/31/2022 1446                   Point: Precautions (Done)     Learning Progress Summary           Patient Acceptance, E,TB, VU by RF at 11/15/2022 1541    Acceptance, E,TB, VU by DL at 11/15/2022 0305    Acceptance, E,TB, VU by RF at 11/14/2022 1528    Acceptance, E,TB, VU by RF at 11/10/2022 1551    Acceptance, E,TB, VU by RF at 11/9/2022 1622    Acceptance, E,TB, VU by DG at 11/9/2022 0050    Acceptance, E,TB, VU by RF at 11/8/2022 1539    Acceptance, E,TB, VU by DG at 11/8/2022 0114    Acceptance, E,TB, VU by RF at 11/7/2022 1607    Acceptance, E,TB, VU by DG at 11/6/2022 2023    Acceptance, E,TB, VU by RF at 11/4/2022 1551    Acceptance, E,TB, VU,DU by HR at 11/3/2022 1541    Acceptance, E,D, VU,NR by RG at 11/3/2022 1442    Acceptance, E,TB, VU by DG at 11/1/2022 2016    Acceptance, E,D, VU,NR by RG at 11/1/2022 1541    Acceptance, E,TB, VU by DG at 10/31/2022 2315    Acceptance, E,D, VU,NR by RG at 10/31/2022 1446                               User Key     Initials Effective Dates Name Provider Type Discipline     06/16/21 -  Phyllis Yañez, RN Registered Nurse Nurse     06/16/21 -  Meron Lei PTA Physical Therapist Assistant PT    RG 06/16/21 -  Michi Mckeon PTA Physical Therapist Assistant PT    HR 01/14/22 -  Hanna Murphy PTA Physical Therapist Assistant PT    DL 03/16/22 -  Hellen Mcdonnell, RN Registered Nurse Nurse                PT Recommendation and Plan    Frequency of Treatment (PT): 5 times per week, 90 minutes per session     Daily Progress Summary (PT)  Functional Goal Overall Progress (PT): progressing toward functional goals as expected  Daily Progress Summary (PT): Acitivity hindered due to increased LBP with exertion. R knee ROM deficits continually noted in both flexion and extension. Continued skilled care required for further improvements to ensure maximum safe function upon D/C.  Impairments Still Limiting Function (PT): functional activity tolerance  impairment, pain, range of motion deficit, strength deficit  Recommendations (PT): Continue per current POC               Time Calculation:      PT Charges     Row Name 11/15/22 1541             Time Calculation    Start Time 1000  -RF      Stop Time 1130  -RF      Time Calculation (min) 90 min  -RF      PT Received On 11/15/22  -RF      PT - Next Appointment 11/16/22  -RF      PT Goal Re-Cert Due Date 11/25/22  -RF         Time Calculation- PT    Total Timed Code Minutes- PT 90 minute(s)  -RF            User Key  (r) = Recorded By, (t) = Taken By, (c) = Cosigned By    Initials Name Provider Type    RF Meron Lei, PTA Physical Therapist Assistant                Therapy Charges for Today     Code Description Service Date Service Provider Modifiers Qty    28145821469 HC GAIT TRAINING EA 15 MIN 11/14/2022 Meron Lei, PTA GP, CQ 1    16900672867 HC PT THER PROC EA 15 MIN 11/14/2022 Meron Lei, PTA GP, CQ 4    38865654696 HC PT THERAPEUTIC ACT EA 15 MIN 11/14/2022 Meron Lei, PTA GP, CQ 1    10985037368 HC GAIT TRAINING EA 15 MIN 11/15/2022 Meron Lei, PTA GP, CQ 2    93571194120 HC PT THER PROC EA 15 MIN 11/15/2022 Meron Lei, PTA GP, CQ 2    34527590051 HC PT THERAPEUTIC ACT EA 15 MIN 11/15/2022 Meron Lei PTA GP, CQ 2                   Meron Lei PTA  11/15/2022

## 2022-11-15 NOTE — SIGNIFICANT NOTE
11/15/22 1515   Plan   Plan Left message for sister Ros [phone] about plans for pt to be discharged on 11-22-22.

## 2022-11-15 NOTE — PROGRESS NOTES
[facility]  PROGRESS NOTE     Patient Identification:  Name:  Melchor Hardwick III  Age:  57 y.o.  Sex:  male  :  1965  MRN:  [id number]  Visit Number:  [id number]  ROOM: 109/     Primary Care Provider:  Missy Up APRN    Length of stay in inpatient status:  18    Subjective     Chief Compliant:  No chief complaint on file.      History of Presenting Illness: 57-year-old gentleman who was treated in LTAC for septic knee, bacteremia, MRSA suspected endocarditis.  Patient complaining of back pain and states that Flexeril was helpful but does not cause him to be extremely sleepy.  Patient has no other complaints at this time    Objective     Current Hospital Meds:ascorbic acid, 500 mg, Oral, Daily  aspirin, 81 mg, Oral, Daily  DAPTOmycin, 6 mg/kg (Adjusted), Intravenous, Q24H  docusate sodium, 100 mg, Oral, BID  fludrocortisone, 50 mcg, Oral, Daily  fondaparinux, 2.5 mg, Subcutaneous, Q24H  Insulin Aspart, 0-14 Units, Subcutaneous, TID AC  insulin detemir, 18 Units, Subcutaneous, Q12H  iron polysaccharides, 150 mg, Oral, Daily  levothyroxine, 25 mcg, Oral, Q AM  methocarbamol, 750 mg, Oral, Q6H  midodrine, 10 mg, Oral, TID AC  multivitamin with minerals, 1 tablet, Oral, Daily  pantoprazole, 40 mg, Oral, Q AM  polyethylene glycol, 17 g, Oral, Daily  pregabalin, 100 mg, Oral, Q12H    Pharmacy Consult,       ----------------------------------------------------------------------------------------------------------------------  Vital Signs:  Temp:  [98.6 °F (37 °C)] 98.6 °F (37 °C)  Heart Rate:  [82-92] 88  Resp:  [16-18] 16  BP: (100-124)/(55-68) 105/55  SpO2:  [96 %-98 %] 98 %  on  Flow (L/min):  [2] 2;   Device (Oxygen Therapy): room air  Body mass index is 28.34 kg/m².    Wt Readings from Last 3 Encounters:   22 89.6 kg (197 lb 8.5 oz)   22 98 kg (216 lb)   22 115 kg (254 lb)     Intake & Output (last 3 days)        07 07 07  0701  11/15 0700 11/15 0701 11/16 0700    P.O. 1320 1200 1680 375    Total Intake(mL/kg) 1320 (14.7) 1200 (13.4) 1680 (18.8) 375 (4.2)    Urine (mL/kg/hr) 2650 (1.2) 3900 (1.8) 1600 (0.7)     Stool 0 0 0     Total Output 2650 3900 1600     Net -1330 -2700 +80 +375            Urine Unmeasured Occurrence   2 x     Stool Unmeasured Occurrence 2 x 1 x 1 x         Diet Regular Texture (IDDSI 7); Regular Consistency; Diabetic Diets; Consistent Carbohydrate  ----------------------------------------------------------------------------------------------------------------------  Physical exam:  Constitutional:   No acute distress  HEENT: Normocephalic atraumatic  Neck: Supple   Cardiovascular: Regular rate and rhythm  Pulmonary/Chest: Clear to auscultation  Abdominal: Positive bowel sounds soft.   Musculoskeletal: Right knee patient still has decreased range of motion but improved.  No effusions noted  Neurological: No focal deficits  Skin: No rash  Peripheral vascular:  Genitourinary:  ----------------------------------------------------------------------------------------------------------------------    Last echocardiogram:  Results for orders placed during the hospital encounter of 10/28/22    Adult Transesophageal Echo (TYRESE) W/ Cont if Necessary Per Protocol    Interpretation Summary  •  Indication for TYRESE -to rule out infective endocarditis in a patient with MRSA bacteremia.  •  Findings-  •  Left ventricular systolic function is normal. Estimated left ventricular EF = 60%  •  Left atrial appendage is well visualized and is free of thrombus.  •  Saline test was negative for the evidence of shunt in interatrial septum.  •  The tricuspid valve appeared normal in structure. There was a moderate sized echodense structure seen above  the posterior tricuspid leaflet but not attached to the leaflet. The appearance and location are not typical for regurgitation. This structure could be a normal variant. No evidence of  tricuspid valve stenosis or significant regurgitations.  •  Other valves also appear normal in structure with no evidence of stenosis or significant regurgitations.  No vegetations seen on these valves.  •  There is no evidence of pericardial effusion.  •  Comment-  •  In view of presence of MRSA bacteremia, recommend to extend antibiotic therapy for possible infective endocarditis and repeat TYRESE in 3 months.    ----------------------------------------------------------------------------------------------------------------------  Results from last 7 days   Lab Units 11/14/22 0226 11/13/22  0142 11/12/22  0139   CRP mg/dL 5.83*  --  9.69*   WBC 10*3/mm3 2.42* 2.83* 3.37*   HEMOGLOBIN g/dL 8.6* 8.9* 8.6*   HEMATOCRIT % 28.0* 28.3* 28.5*   MCV fL 84.3 82.7 85.1   MCHC g/dL 30.7* 31.4* 30.2*   PLATELETS 10*3/mm3 103* 96* 91*         Results from last 7 days   Lab Units 11/14/22 0226 11/12/22  0139 11/10/22  0102   SODIUM mmol/L 132* 136 137   POTASSIUM mmol/L 3.9 4.0 3.9   CHLORIDE mmol/L 98 101 103   CO2 mmol/L 25.7 24.8 23.7   BUN mg/dL 9 10 11   CREATININE mg/dL 0.68* 0.73* 0.62*   CALCIUM mg/dL 8.1* 8.2* 8.4*   GLUCOSE mg/dL 238* 118* 110*   Estimated Creatinine Clearance: 134.9 mL/min (A) (by C-G formula based on SCr of 0.68 mg/dL (L)).  No results found for: AMMONIA  Results from last 7 days   Lab Units 11/14/22 0226   CK TOTAL U/L 18*             Glucose   Date/Time Value Ref Range Status   11/15/2022 1109 215 (H) 70 - 130 mg/dL Final     Comment:     Meter: [id number] : [id number] NADJA RANGEL   11/15/2022 0607 180 (H) 70 - 130 mg/dL Final     Comment:     Meter: [id number] : [id number] ALISSON TORIBIO   11/14/2022 1632 268 (H) 70 - 130 mg/dL Final     Comment:     Meter: [id number] : [id number] Toribio Eunice   11/14/2022 1051 231 (H) 70 - 130 mg/dL Final     Comment:     Meter: [id number] : [id number] NADJA RANGEL   11/14/2022 0602 163 (H) 70 - 130 mg/dL Final     Comment:     Meter:  [id number] : [id number] YAMILET GAMA   11/13/2022 1623 280 (H) 70 - 130 mg/dL Final     Comment:     RN Notified Meter: [id number] : [id number] DAYNE VILLANUEVA   11/13/2022 1150 226 (H) 70 - 130 mg/dL Final     Comment:     Meter: [id number] : [id number] Penelope Dawson   11/13/2022 0547 147 (H) 70 - 130 mg/dL Final     Comment:     Meter: [id number] : [id number] YAMILET GAMA     Lab Results   Component Value Date    TSH 2.390 10/10/2022    FREET4 0.87 (L) 10/10/2022     No results found for: PREGTESTUR, PREGSERUM, HCG, HCGQUANT  Pain Management Panel     Pain Management Panel Latest Ref Rng & Units 5/24/2022 5/11/2022    CREATININE UR mg/dL 91.0 -    AMPHETAMINES SCREEN, URINE Negative - Negative    BARBITURATES SCREEN Negative - Negative    BENZODIAZEPINE SCREEN, URINE Negative - Negative    BUPRENORPHINEUR Negative - Negative    COCAINE SCREEN, URINE Negative - Negative    METHADONE SCREEN, URINE Negative - Negative    METHAMPHETAMINEUR Negative - Negative        Brief Urine Lab Results  (Last result in the past 365 days)      Color   Clarity   Blood   Leuk Est   Nitrite   Protein   CREAT   Urine HCG        10/19/22 0746 Yellow   Clear   Negative   Negative   Negative   Negative               Blood Culture   Date Value Ref Range Status   11/12/2022 No growth at 3 days  Preliminary   11/12/2022 No growth at 3 days  Preliminary         No results found for: URINECX  No results found for: WOUNDCX  No results found for: STOOLCX  Results from last 7 days   Lab Units 11/14/22  0226 11/12/22  0139   CRP mg/dL 5.83* 9.69*       I have personally looked at the labs and they are summarized above.  ----------------------------------------------------------------------------------------------------------------------  Detailed radiology reports for the last 24 hours:    Imaging Results (Last 24 Hours)     ** No results found for the last 24 hours. **        Final impressions for the last 30 days of radiology  reports:    Adult Transesophageal Echo (TYRESE) W/ Cont if Necessary Per Protocol    Addendum Date: 11/4/2022    •  Indication for TYRESE -to rule out infective endocarditis in a patient with MRSA bacteremia. •  Findings- •  Left ventricular systolic function is normal. Estimated left ventricular EF = 60% •  Left atrial appendage is well visualized and is free of thrombus. •  Saline test was negative for the evidence of shunt in interatrial septum. •  The tricuspid valve appeared normal in structure. There was a moderate sized echodense structure seen above  the posterior tricuspid leaflet but not attached to the leaflet. The appearance and location are not typical for regurgitation. This structure could be a normal variant. No evidence of tricuspid valve stenosis or significant regurgitations. •  Other valves also appear normal in structure with no evidence of stenosis or significant regurgitations.  No vegetations seen on these valves. •  There is no evidence of pericardial effusion. •  Comment- •  In view of presence of MRSA bacteremia, recommend to extend antibiotic therapy for possible infective endocarditis and repeat TYRESE in 3 months.     Addendum Date: 11/3/2022    •  Indication for TYRESE -to rule out infective endocarditis in a patient with MRSA bacteremia. •  Findings- •  Left ventricular systolic function is normal. Estimated left ventricular EF = 60% •  Left atrial appendage is well visualized and is free of thrombus. •  Saline test was negative for the evidence of shunt in interatrial septum. •  The tricuspid valve appeared normal in structure. There was a moderate sized echodense structure seen above  the posterior tricuspid leaflet but not attached to the leaflet. The appearance and location are not typical for regurgitation. This structure could be a normal variant. No evidence of tricuspid valve stenosis or significant regurgitations. •  Other valves also appear normal in structure with no evidence of  stenosis or significant regurgitations.  No vegetations seen on these valves. •  There is no evidence of pericardial effusion. •  Comment- •  In view of presence of MRSA bacteremia, recommend to extend antibiotic therapy for infective endocarditis and repeat TYRESE in 3 months.     Addendum Date: 11/3/2022    •  Indication for TYRESE -to rule out infective endocarditis in a patient with MRSA bacteremia. •  Findings- •  Left ventricular systolic function is normal. Estimated left ventricular EF = 60% •  Left atrial appendage is well visualized and is free of thrombus. •  Saline test was negative for the evidence of shunt in interatrial septum. •  The tricuspid valve appeared normal in structure. There was a moderate sized echodense structure seen above  the posterior tricuspid leaflet but not attached to the leaflet. The appearance and location are not typical for regurgitation. This structure could be a normal variant. No evidence of tricuspid valve stenosis or significant regurgitations. •  Other valves also appear normal in structure with no evidence of stenosis or significant regurgitations.  No vegetations seen on these valves. •  There is no evidence of pericardial effusion.     Addendum Date: 11/3/2022    •  Indication for TYRESE -to rule out infective endocarditis in a patient with MRSA bacteremia. •  Findings- •  Left ventricular systolic function is normal. Estimated left ventricular EF = 60% •  Left atrial appendage is well visualized and is free of thrombus. •  Saline test was negative for the evidence of shunt in interatrial septum. •  The tricuspid valve appeared normal in structure. There was a moderate sized echodense structure seen in both the posterior tricuspid leaflet but not attached to the leaflet. The appearance and location are not typical for regurgitation. This structure could be a normal variant. No evidence of tricuspid valve stenosis or significant regurgitations. •  Other valves also appear  normal in structure with no evidence of stenosis or significant regurgitations.  No vegetations seen on these valves. •  There is no evidence of pericardial effusion.     XR Chest 1 View    Result Date: 10/18/2022  Probable airspace disease in the right lung with low lung volumes. Right PICC line at the level of diaphragm.  Signer Name: Yaquelin Ferreira MD  Signed: 10/18/2022 9:00 PM  Workstation Name: [facility]  [facility] Venous Doppler Lower Extremity Right (duplex)    Result Date: 11/12/2022  No deep venous thrombus in the right lower extremity. Signer Name: Thomas Day MD  Signed: 11/12/2022 11:34 AM  Workstation Name: [email or web address]  Radiology [facility] Venous Doppler Upper Extremity Bilateral (duplex)    Result Date: 11/12/2022  Negative examination.  No evidence of bilateral upper extremity venous thrombosis. Signer Name: Thomas Day MD  Signed: 11/12/2022 11:33 AM  Workstation Name: [email or web address]  Radiology [facility]    I have personally looked at the radiology images and read the final radiology report.    Assessment & Plan    Debility after prolonged therapy for recent septic knee, MRSA bacteremia and likely endocarditis.  Patient is getting daptomycin and will continue through November 21.  Patient will have a follow-up TYRESE.  Patient is independent for bed mobility; contact-guard for transfers; able to walk 50 feet with contact-guard and rolling walker.  Patient requiring minimum assistance for lower body dressing and set up for upper body ADLs.    Lumbago with muscle spasm--we will discontinue Flexeril and give patient trial of Robaxin at this time    Orthostatic hypotension patient on midodrine and Florinef.  Patient is improved.    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  Reasonably well-controlled    Thrombocytopenia likely secondary to cirrhosis.      VTE Prophylaxis:   Mechanical Order  History:     None      Pharmalogical Order History:      Ordered     Dose Route Frequency Stop    10/28/22 5486  fondaparinux (ARIXTRA) injection 2.5 mg         2.5 mg SC Every 24 Hours Scheduled --                    Joe Mkie MD  [facility]  11/15/22  12:07 EST

## 2022-11-15 NOTE — SIGNIFICANT NOTE
11/15/22 9320   Plan   Plan Team conference held today.  Spoke to pt about how he is doing in therapy and plans for discharge on 11-22-22.  Pt plans to return to sister Ros's home at discharge.  Sister to assist with care at discharge.  Pt to complete I.V. antibiotics prior to discharge.  Will follow.   Patient/Family in Agreement with Plan yes

## 2022-11-16 LAB
GLUCOSE BLDC GLUCOMTR-MCNC: 205 MG/DL (ref 70–130)
GLUCOSE BLDC GLUCOMTR-MCNC: 223 MG/DL (ref 70–130)
GLUCOSE BLDC GLUCOMTR-MCNC: 242 MG/DL (ref 70–130)
GLUCOSE BLDC GLUCOMTR-MCNC: 267 MG/DL (ref 70–130)

## 2022-11-16 PROCEDURE — 97110 THERAPEUTIC EXERCISES: CPT

## 2022-11-16 PROCEDURE — 63710000001 INSULIN DETEMIR PER 5 UNITS: Performed by: INTERNAL MEDICINE

## 2022-11-16 PROCEDURE — 82962 GLUCOSE BLOOD TEST: CPT

## 2022-11-16 PROCEDURE — 63710000001 INSULIN ASPART PER 5 UNITS: Performed by: INTERNAL MEDICINE

## 2022-11-16 PROCEDURE — 97112 NEUROMUSCULAR REEDUCATION: CPT

## 2022-11-16 PROCEDURE — 99232 SBSQ HOSP IP/OBS MODERATE 35: CPT | Performed by: INTERNAL MEDICINE

## 2022-11-16 PROCEDURE — 25010000002 FONDAPARINUX PER 0.5 MG: Performed by: FAMILY MEDICINE

## 2022-11-16 PROCEDURE — 97530 THERAPEUTIC ACTIVITIES: CPT

## 2022-11-16 PROCEDURE — 25010000002 DAPTOMYCIN PER 1 MG: Performed by: INTERNAL MEDICINE

## 2022-11-16 PROCEDURE — 97535 SELF CARE MNGMENT TRAINING: CPT

## 2022-11-16 PROCEDURE — 97116 GAIT TRAINING THERAPY: CPT

## 2022-11-16 PROCEDURE — 99231 SBSQ HOSP IP/OBS SF/LOW 25: CPT | Performed by: FAMILY MEDICINE

## 2022-11-16 RX ORDER — CYCLOBENZAPRINE HCL 10 MG
5 TABLET ORAL 3 TIMES DAILY PRN
Status: DISCONTINUED | OUTPATIENT
Start: 2022-11-16 | End: 2022-11-30 | Stop reason: HOSPADM

## 2022-11-16 RX ADMIN — Medication 1 TABLET: at 08:17

## 2022-11-16 RX ADMIN — PREGABALIN 100 MG: 100 CAPSULE ORAL at 20:37

## 2022-11-16 RX ADMIN — INSULIN ASPART 5 UNITS: 100 INJECTION, SOLUTION INTRAVENOUS; SUBCUTANEOUS at 08:17

## 2022-11-16 RX ADMIN — CYCLOBENZAPRINE 5 MG: 10 TABLET, FILM COATED ORAL at 11:31

## 2022-11-16 RX ADMIN — FLUDROCORTISONE ACETATE 50 MCG: 0.1 TABLET ORAL at 08:17

## 2022-11-16 RX ADMIN — Medication 150 MG: at 08:17

## 2022-11-16 RX ADMIN — CARBIDOPA AND LEVODOPA 10 MG: 50; 200 TABLET, EXTENDED RELEASE ORAL at 06:30

## 2022-11-16 RX ADMIN — DICLOFENAC 4 G: 10 GEL TOPICAL at 20:37

## 2022-11-16 RX ADMIN — ASPIRIN 81 MG: 81 TABLET, COATED ORAL at 08:17

## 2022-11-16 RX ADMIN — DICLOFENAC 4 G: 10 GEL TOPICAL at 11:31

## 2022-11-16 RX ADMIN — CARBIDOPA AND LEVODOPA 10 MG: 50; 200 TABLET, EXTENDED RELEASE ORAL at 17:24

## 2022-11-16 RX ADMIN — DICLOFENAC 4 G: 10 GEL TOPICAL at 18:19

## 2022-11-16 RX ADMIN — PREGABALIN 100 MG: 100 CAPSULE ORAL at 08:17

## 2022-11-16 RX ADMIN — INSULIN ASPART 5 UNITS: 100 INJECTION, SOLUTION INTRAVENOUS; SUBCUTANEOUS at 11:30

## 2022-11-16 RX ADMIN — METHOCARBAMOL 750 MG: 750 TABLET, FILM COATED ORAL at 05:22

## 2022-11-16 RX ADMIN — CARBIDOPA AND LEVODOPA 10 MG: 50; 200 TABLET, EXTENDED RELEASE ORAL at 11:30

## 2022-11-16 RX ADMIN — OXYCODONE HYDROCHLORIDE AND ACETAMINOPHEN 500 MG: 500 TABLET ORAL at 08:17

## 2022-11-16 RX ADMIN — PANTOPRAZOLE SODIUM 40 MG: 40 TABLET, DELAYED RELEASE ORAL at 05:22

## 2022-11-16 RX ADMIN — INSULIN DETEMIR 18 UNITS: 100 INJECTION, SOLUTION SUBCUTANEOUS at 08:17

## 2022-11-16 RX ADMIN — HYDROCODONE BITARTRATE AND ACETAMINOPHEN 1 TABLET: 5; 325 TABLET ORAL at 08:17

## 2022-11-16 RX ADMIN — INSULIN ASPART 5 UNITS: 100 INJECTION, SOLUTION INTRAVENOUS; SUBCUTANEOUS at 17:24

## 2022-11-16 RX ADMIN — DOCUSATE SODIUM 100 MG: 100 CAPSULE ORAL at 08:17

## 2022-11-16 RX ADMIN — DAPTOMYCIN 500 MG: 500 INJECTION, POWDER, LYOPHILIZED, FOR SOLUTION INTRAVENOUS at 18:38

## 2022-11-16 RX ADMIN — INSULIN DETEMIR 18 UNITS: 100 INJECTION, SOLUTION SUBCUTANEOUS at 20:38

## 2022-11-16 RX ADMIN — FONDAPARINUX SODIUM 2.5 MG: 2.5 INJECTION, SOLUTION SUBCUTANEOUS at 08:17

## 2022-11-16 RX ADMIN — LEVOTHYROXINE SODIUM 25 MCG: 0.07 TABLET ORAL at 05:22

## 2022-11-16 RX ADMIN — POLYETHYLENE GLYCOL (3350) 17 G: 17 POWDER, FOR SOLUTION ORAL at 08:17

## 2022-11-16 NOTE — SIGNIFICANT NOTE
11/16/22 0950   Plan   Plan SS spoke to sister Ros [phone] about how pt is doing in therapy and plans for discharge on 11-22-22.   Sister requests nursing work with pt on using bedside commode instead of bed pan prior to discharge; RN aware.  Sister will come to rehab on 11-18-22 at 9:00 am for education with PT/OT.  Pt plans to return home with sister Ros and brother-in-law Suhas White at discharge.  Sister says she is no longer working.  Will follow.   Patient/Family in Agreement with Plan yes

## 2022-11-16 NOTE — THERAPY TREATMENT NOTE
Inpatient Rehabilitation - Occupational Therapy Treatment Note    YVONNE Gaines     Patient Name: Melchor Hardwick III  : 1965  MRN: [id number]    Today's Date: 2022                 Admit Date: 10/28/2022         ICD-10-CM ICD-9-CM   1. Staphylococcal arthritis of right knee (HCC)  M00.061 711.06     041.10       Patient Active Problem List   Diagnosis   • Hyperlipidemia   • Hypertensive disorder   • Obesity   • Type 2 diabetes mellitus with hyperglycemia, with long-term current use of insulin (HCC)   • Gas gangrene of foot (HCC)   • Cellulitis in diabetic foot (HCC)   • Other acute osteomyelitis of left foot (HCC)   • Osteomyelitis (HCC)   • Critical illness myopathy   • Staphylococcal arthritis of right knee (HCC)   • Other cirrhosis of liver (HCC)   • MRSA bacteremia   • Endocarditis of tricuspid valve   • Wound of right buttock   • History of osteomyelitis   • Debility       Past Medical History:   Diagnosis Date   • Diabetes mellitus (HCC)    • Hyperlipidemia    • Hypertension    • Pyogenic arthritis of right knee joint, due to unspecified organism (HCC) 2022       Past Surgical History:   Procedure Laterality Date   • ABSCESS DRAINAGE      PERINEUM   • AMPUTATION FOOT Left 2022    Procedure: AMPUTATION FOOT;  Surgeon: Addison Colby MD;  Location: [facility] OR;  Service: Podiatry;  Laterality: Left;   • INCISION AND DRAINAGE LEG Left 05/10/2022    Procedure: INCISION AND DRAINAGE LOWER EXTREMITY;  Surgeon: Addison Colby MD;  Location: [facility] OR;  Service: Podiatry;  Laterality: Left;   • KNEE INCISION AND DRAINAGE Right 2022    Procedure: KNEE INCISION AND DRAINAGE RIGHT;  Surgeon: Brain Bentley MD;  Location: [facility] at [facility] OR;  Service: Orthopedics;  Laterality: Right;   • WOUND DEBRIDEMENT Left 2022    Procedure: DEBRIDEMENT FOOT;  Surgeon: Addison Colby MD;  Location: [facility] OR;  Service: Podiatry;  Laterality: Left;             IRF OT ASSESSMENT FLOWSHEET (last 12 hours)      IRF OT Evaluation and Treatment     Row Name 11/16/22 1233          OT Time and Intention    Document Type daily treatment  -HB     Mode of Treatment individual therapy;occupational therapy  -HB     Total Minutes, Occupational Therapy 90  -HB     Patient Effort good  -HB     Row Name 11/16/22 1233          General Information    Patient Profile Reviewed yes  -HB     Patient/Family/Caregiver Comments/Observations Patient and RN okd OT this date.  -HB     General Observations of Patient Patient tolerated OT well with no adverse reactions. Pt limited by back pain. RN aware per pt  -HB     Existing Precautions/Restrictions fall;brace worn when out of bed  -HB     Limitations/Impairments --  back pain  -HB     Row Name 11/16/22 1233          Pain Assessment    Pretreatment Pain Rating 6/10  -HB     Posttreatment Pain Rating 6/10  -HB     Pain Location - --  back pain(mostly right side)  -     Additional Documentation --  pt said he was pre-medicated prior to tx  -HB     Row Name 11/16/22 1233          Cognition/Psychosocial    Affect/Mental Status (Cognition) WFL  -HB     Orientation Status (Cognition) oriented x 4  -HB     Follows Commands (Cognition) WFL  -HB     Row Name 11/16/22 1233          Bathing    [place] Level (Bathing) bathing skills;set up  -HB     Position (Bathing) supine  -HB     Row Name 11/16/22 1233          Upper Body Dressing    [place] Level (Upper Body Dressing) don;pull over garment  -HB     Position (Upper Body Dressing) supine  -HB     Row Name 11/16/22 1233          Lower Body Dressing    [place] Level (Lower Body Dressing) don;doff;pants/bottoms;set up;socks;moderate assist (50% patient effort)  -HB     Position (Lower Body Dressing) supine  -HB     Row Name 11/16/22 1233          Grooming    [place] Level (Grooming) grooming skills;set up  -HB     Position (Grooming) supported sitting  -HB     Set-up Assistance (Grooming) adaptive equipment set-up  -HB     Row Name  11/16/22 1233          Bed Mobility    Supine-Sit [place] (Bed Mobility) nonverbal cues (demo/gesture);verbal cues;standby assist  -HB     Row Name 11/16/22 1233          Bed-Chair Transfer    Bed-Chair [place] (Transfers) nonverbal cues (demo/gesture);verbal cues;contact guard  -     Assistive Device (Bed-Chair Transfers) walker, front-wheeled  -HB     Row Name 11/16/22 1233          Sit-Stand Transfer    Sit-Stand [place] (Transfers) contact guard;verbal cues;nonverbal cues (demo/gesture)  -     Assistive Device (Sit-Stand Transfers) wheelchair;walker, front-wheeled  -HB     Row Name 11/16/22 1233          Stand-Sit Transfer    Stand-Sit [place] (Transfers) contact guard;standby assist  -     Assistive Device (Stand-Sit Transfers) wheelchair;walker, front-wheeled  -HB     Row Name 11/16/22 1233          Motor Skills    Motor Skills coordination;functional endurance;motor control/coordination interventions  -     Motor Control/Coordination Interventions fine motor manipulation/dexterity activities;therapeutic exercise/ROM;gross motor coordination activities  -     Therapeutic Exercise shoulder;elbow/forearm;wrist;hand  -     Additional Documentation --  BUE TA to increase ADL status/ endurance; rickshaw 5lbs 4 sets of 20 reps; PRE 15 min ; rest between tasks.  -     Row Name 11/16/22 1233          Positioning and Restraints    Pre-Treatment Position in bed  -HB     Post Treatment Position wheelchair  -     In Bed call light within reach;encouraged to call for assist  -HB           User Key  (r) = Recorded By, (t) = Taken By, (c) = Cosigned By    Initials Name Effective Dates    HB Dorothy Rosas, PAYAM 05/25/21 -                  Occupational Therapy Education     Title: PT OT SLP Therapies (Done)     Topic: Occupational Therapy (Done)     Point: ADL training (Done)     Description:   Instruct learner(s) on proper safety adaptation and remediation techniques during self care or  transfers.   Instruct in proper use of assistive devices.              Learning Progress Summary           Patient Acceptance, E, VU,NR by HB at 11/16/2022 1425    Acceptance, E,TB, VU by DL at 11/15/2022 2033    Acceptance, E, VU,NR by HB at 11/15/2022 1242    Acceptance, E,TB, VU by DL at 11/15/2022 0305    Acceptance, E,TB,D, VU,DU,NR by LA at 11/11/2022 1248    Acceptance, E, VU,NR by BF at 11/10/2022 1431    Acceptance, E, VU,NR by HB at 11/9/2022 1230    Acceptance, E,TB, VU by DG at 11/9/2022 0050    Acceptance, E, VU,NR by HB at 11/8/2022 1426    Acceptance, E,TB, VU by DG at 11/8/2022 0114    Acceptance, E, VU,NR by HB at 11/7/2022 1155    Acceptance, E,TB, VU by DG at 11/6/2022 2023    Acceptance, E, VU,NR by HB at 11/4/2022 1153    Acceptance, E, VU,NR by HB at 11/3/2022 1428    Acceptance, E,TB, VU by DG at 11/1/2022 2016    Acceptance, E,TB, VU by DG at 10/31/2022 2315    Acceptance, E, VU,NR by BF at 10/31/2022 1429    Acceptance, E,TB, VU by DL at 10/31/2022 0101    Acceptance, E,TB, VU by DL at 10/29/2022 2321    Acceptance, E, VU,NR by HB at 10/29/2022 1137                   Point: Home exercise program (Done)     Description:   Instruct learner(s) on appropriate technique for monitoring, assisting and/or progressing therapeutic exercises/activities.              Learning Progress Summary           Patient Acceptance, E, VU,NR by HB at 11/16/2022 1425    Acceptance, E,TB, VU by DL at 11/15/2022 2033    Acceptance, E, VU,NR by HB at 11/15/2022 1242    Acceptance, E,TB, VU by DL at 11/15/2022 0305    Acceptance, E,TB,D, VU,DU,NR by LA at 11/11/2022 1248    Acceptance, E, VU,NR by BF at 11/10/2022 1431    Acceptance, E, VU,NR by HB at 11/9/2022 1230    Acceptance, E,TB, VU by DG at 11/9/2022 0050    Acceptance, E, VU,NR by HB at 11/8/2022 1426    Acceptance, E,TB, VU by DG at 11/8/2022 0114    Acceptance, E, VU,NR by HB at 11/7/2022 1155    Acceptance, E,TB, VU by DG at 11/6/2022 2023    Acceptance, E,  VU,NR by HB at 11/4/2022 1153    Acceptance, E, VU,NR by HB at 11/3/2022 1428    Acceptance, E,TB, VU by DG at 11/1/2022 2016    Acceptance, E,TB, VU by DG at 10/31/2022 2315    Acceptance, E,TB, VU by DL at 10/31/2022 0101    Acceptance, E,TB, VU by DL at 10/29/2022 2321    Acceptance, E, VU,NR by HB at 10/29/2022 1137                   Point: Precautions (Done)     Description:   Instruct learner(s) on prescribed precautions during self-care and functional transfers.              Learning Progress Summary           Patient Acceptance, E, VU,NR by HB at 11/16/2022 1425    Acceptance, E,TB, VU by DL at 11/15/2022 2033    Acceptance, E, VU,NR by HB at 11/15/2022 1242    Acceptance, E,TB, VU by DL at 11/15/2022 0305    Acceptance, E,TB,D, VU,DU,NR by LA at 11/11/2022 1248    Acceptance, E, VU,NR by HB at 11/9/2022 1230    Acceptance, E,TB, VU by DG at 11/9/2022 0050    Acceptance, E, VU,NR by HB at 11/8/2022 1426    Acceptance, E,TB, VU by DG at 11/8/2022 0114    Acceptance, E, VU,NR by HB at 11/7/2022 1155    Acceptance, E,TB, VU by DG at 11/6/2022 2023    Acceptance, E, VU,NR by HB at 11/4/2022 1153    Acceptance, E, VU,NR by HB at 11/3/2022 1428    Acceptance, E,TB, VU by DG at 11/1/2022 2016    Acceptance, E,TB, VU by DG at 10/31/2022 2315    Acceptance, E, VU,NR by BF at 10/31/2022 1429    Acceptance, E,TB, VU by DL at 10/31/2022 0101    Acceptance, E,TB, VU by DL at 10/29/2022 2321    Acceptance, E, VU,NR by HB at 10/29/2022 1137                   Point: Body mechanics (Done)     Description:   Instruct learner(s) on proper positioning and spine alignment during self-care, functional mobility activities and/or exercises.              Learning Progress Summary           Patient Acceptance, E, VU,NR by HB at 11/16/2022 1425    Acceptance, E,TB, VU by DL at 11/15/2022 2033    Acceptance, E, VU,NR by HB at 11/15/2022 1242    Acceptance, E,TB, VU by DL at 11/15/2022 0305    Acceptance, E,TB,D, VU,DU,NR by LA at  11/11/2022 1248    Acceptance, E, VU,NR by HB at 11/9/2022 1230    Acceptance, E,TB, VU by DG at 11/9/2022 0050    Acceptance, E, VU,NR by HB at 11/8/2022 1426    Acceptance, E,TB, VU by DG at 11/8/2022 0114    Acceptance, E, VU,NR by HB at 11/7/2022 1155    Acceptance, E,TB, VU by DG at 11/6/2022 2023    Acceptance, E, VU,NR by HB at 11/4/2022 1153    Acceptance, E, VU,NR by HB at 11/3/2022 1428    Acceptance, E,TB, VU by DG at 11/1/2022 2016    Acceptance, E,TB, VU by DG at 10/31/2022 2315    Acceptance, E,TB, VU by DL at 10/31/2022 0101    Acceptance, E,TB, VU by DL at 10/29/2022 2321    Acceptance, E, VU,NR by HB at 10/29/2022 1137                               User Key     Initials Effective Dates Name Provider Type Discipline     06/16/21 -  Phyllis Yañez, RN Registered Nurse Nurse     06/16/21 -  Mandi Smith OT Occupational Therapist OT    HB 05/25/21 -  Dorothy Rosas OT Occupational Therapist OT    LA 02/14/22 -  Jaqueline Bautista OT Occupational Therapist OT    DL 03/16/22 -  Hellen Mcdonnell, RN Registered Nurse Nurse                    OT Recommendation and Plan    Planned Therapy Interventions (OT): activity tolerance training, adaptive equipment training, BADL retraining, IADL retraining, patient/caregiver education/training, strengthening exercise, transfer/mobility retraining                    Time Calculation:      Time Calculation- OT     Row Name 11/16/22 1426             Time Calculation- OT    OT Start Time 0830  -HB      OT Stop Time 1000  -HB      OT Time Calculation (min) 90 min  -HB      Total Timed Code Minutes- OT 90 minute(s)  -HB      OT Non-Billable Time (min) 10 min  -HB            User Key  (r) = Recorded By, (t) = Taken By, (c) = Cosigned By    Initials Name Provider Type     Dorothy Rosas, OT Occupational Therapist              Therapy Charges for Today     Code Description Service Date Service Provider Modifiers Qty    64009187347 HC OT SELF CARE/MGMT/TRAIN EA 15  MIN 11/15/2022 Dorothy Rosas, OT GO 2    17716645150 HC OT THER PROC EA 15 MIN 11/15/2022 Dorothy Rosas OT GO 2    87738863822 HC OT THERAPEUTIC ACT EA 15 MIN 11/15/2022 Dorothy Rosas OT GO 2    67957102043 HC OT SELF CARE/MGMT/TRAIN EA 15 MIN 11/16/2022 Dorothy Rosas OT GO 3    62036143840 HC OT THER PROC EA 15 MIN 11/16/2022 Dorothy Rosas OT GO 1    86429820401 HC OT THERAPEUTIC ACT EA 15 MIN 11/16/2022 Dorothy Rosas OT GO 2                   Dorothy Rosas OT  11/16/2022

## 2022-11-16 NOTE — THERAPY TREATMENT NOTE
Inpatient Rehabilitation - Physical Therapy Treatment Note       YVONNE Gaines     Patient Name: Melchor Hardwick III  : 1965  MRN: [id number]    Today's Date: 2022                    Admit Date: 10/28/2022      Visit Dx:     ICD-10-CM ICD-9-CM   1. Staphylococcal arthritis of right knee (HCC)  M00.061 711.06     041.10       Patient Active Problem List   Diagnosis   • Hyperlipidemia   • Hypertensive disorder   • Obesity   • Type 2 diabetes mellitus with hyperglycemia, with long-term current use of insulin (HCC)   • Gas gangrene of foot (HCC)   • Cellulitis in diabetic foot (HCC)   • Other acute osteomyelitis of left foot (HCC)   • Osteomyelitis (HCC)   • Critical illness myopathy   • Staphylococcal arthritis of right knee (HCC)   • Other cirrhosis of liver (HCC)   • MRSA bacteremia   • Endocarditis of tricuspid valve   • Wound of right buttock   • History of osteomyelitis   • Debility       Past Medical History:   Diagnosis Date   • Diabetes mellitus (HCC)    • Hyperlipidemia    • Hypertension    • Pyogenic arthritis of right knee joint, due to unspecified organism (HCC) 2022       Past Surgical History:   Procedure Laterality Date   • ABSCESS DRAINAGE      PERINEUM   • AMPUTATION FOOT Left 2022    Procedure: AMPUTATION FOOT;  Surgeon: Addison Colby MD;  Location: [facility] OR;  Service: Podiatry;  Laterality: Left;   • INCISION AND DRAINAGE LEG Left 05/10/2022    Procedure: INCISION AND DRAINAGE LOWER EXTREMITY;  Surgeon: Addison Colby MD;  Location:  [facility] OR;  Service: Podiatry;  Laterality: Left;   • KNEE INCISION AND DRAINAGE Right 2022    Procedure: KNEE INCISION AND DRAINAGE RIGHT;  Surgeon: Brain Bentley MD;  Location: [facility] OR;  Service: Orthopedics;  Laterality: Right;   • WOUND DEBRIDEMENT Left 2022    Procedure: DEBRIDEMENT FOOT;  Surgeon: Addison Colby MD;  Location: [facility] OR;  Service: Podiatry;  Laterality: Left;       PT ASSESSMENT (last 12 hours)     IRF PT  Evaluation and Treatment     Row Name 11/16/22 1543          PT Time and Intention    Document Type daily treatment  -RF     Mode of Treatment individual therapy;physical therapy  -RF     Patient/Family/Caregiver Comments/Observations Pt c/o severe low back pain and R knee pain this date.  -RF     Row Name 11/16/22 1543          General Information    Patient Profile Reviewed yes  -RF     Existing Precautions/Restrictions fall;brace worn when out of bed  air cast LLE  -RF     Row Name 11/16/22 1543          Cognition/Psychosocial    Affect/Mental Status (Cognition) WFL  -RF     Follows Commands (Cognition) WFL  -RF     Cognitive Function WFL  -RF     Row Name 11/16/22 1543          Bed Mobility    Bed Mobility bed mobility (all) activities  -RF     All Activities, [place] (Bed Mobility) modified independence  -RF     Assistive Device (Bed Mobility) bed rails  -RF     Row Name 11/16/22 1543          Transfer Assessment/Treatment    Transfers bed-chair transfer;chair-bed transfer;sit-stand transfer;stand-sit transfer;car transfer  -RF     Row Name 11/16/22 1543          Bed-Chair Transfer    Bed-Chair [place] (Transfers) standby assist;supervision  -RF     Assistive Device (Bed-Chair Transfers) walker, front-wheeled  -RF     Row Name 11/16/22 1543          Chair-Bed Transfer    Chair-Bed [place] (Transfers) standby assist;supervision  -RF     Assistive Device (Chair-Bed Transfers) wheelchair;walker, front-wheeled  -RF     Row Name 11/16/22 1543          Sit-Stand Transfer    Sit-Stand [place] (Transfers) contact guard;standby assist  -RF     Assistive Device (Sit-Stand Transfers) walker, front-wheeled  -RF     Row Name 11/16/22 1543          Stand-Sit Transfer    Stand-Sit [place] (Transfers) contact guard;standby assist  -RF     Assistive Device (Stand-Sit Transfers) wheelchair;walker, front-wheeled  -RF     Row Name 11/16/22 1543          Gait/Stairs (Locomotion)    Gait/Stairs Locomotion  gait/ambulation independence;gait/ambulation assistive device;distance ambulated  -RF     [place] Level (Gait) standby assist;supervision  -RF     Assistive Device (Gait) walker, front-wheeled  -RF     Distance in Feet (Gait) 160  -RF     Pattern (Gait) step-to;step-through  -RF     Bilateral Gait Deviations forward flexed posture  -RF     Right Sided Gait Deviations heel strike decreased;weight shift ability decreased  -     Row Name 11/16/22 1543          Safety Issues, Functional Mobility    Impairments Affecting Function (Mobility) balance;endurance/activity tolerance;pain;range of motion (ROM);postural/trunk control  -     Row Name 11/16/22 1543          Balance    Dynamic Standing Balance contact guard;standby assist  bag toss with reach across midline and outside CODIE; no significant LOB noted. Pt required frequent repositioning due to low back pain.  -     Row Name 11/16/22 1543          Hip (Therapeutic Exercise)    Hip Strengthening (Therapeutic Exercise) bilateral;flexion;extension;aBduction;aDduction;heel slides;marching while seated;marching while standing;mini squats;sitting;standing;2 lb free weight;resistance band;blue;2 sets;10 repetitions  -     Row Name 11/16/22 1543          Knee (Therapeutic Exercise)    Knee PROM (Therapeutic Exercise) right;flexion;extension;sitting;10 second hold;10 repetitions;other (see comments)  skateboard knee flexion with OP, knee extension stretching with foot prop and OP applied.  -RF     Knee Strengthening (Therapeutic Exercise) bilateral;flexion;extension;heel slides;marching while seated;marching while standing;LAQ (long arc quad);hamstring curls;sitting;standing;2 lb free weight;resistance band;blue;2 sets;10 repetitions  -     Row Name 11/16/22 1543          Ankle (Therapeutic Exercise)    Ankle Strengthening (Therapeutic Exercise) bilateral;dorsiflexion;plantarflexion;sitting;2 sets;10 repetitions  -     Row Name 11/16/22 1543          Aerobic  Exercise    Type (Aerobic Exercise) recumbent stationary bike  -RF     Time Performed (Aerobic Exercise) 10  Pt unable to make full revolutions; pt cycled in arching motion for stretching in both flexion and extension on R knee.  -RF     Comment, Aerobic Exercise (Therapeutic Exercise) LVL 1.0  -RF     Row Name 11/16/22 154          Positioning and Restraints    Pre-Treatment Position sitting in chair/recliner  -RF     Post Treatment Position bed  -RF     In Bed supine;call light within reach;encouraged to call for assist  -RF     Row Name 11/16/22 1543          Daily Progress Summary (PT)    Functional Goal Overall Progress (PT) progressing toward functional goals as expected  -RF     Daily Progress Summary (PT) Fair functional mobility and ambulation quality noted this date; independence hindered due to low back pain. R knee flexion and extension deficits remain, although improving. Continued skilled care required for further improvements to ensure maximum safe function upon D/C.  -RF     Impairments Still Limiting Function (PT) functional activity tolerance impairment;pain;range of motion deficit;strength deficit  -RF     Recommendations (PT) Continue per current POC  -RF     Row Name 11/16/22 1543          IRF PT Goals    Bed Mobility Goal Selection (PT-IRF) bed mobility, PT goal 1  -RF     Transfer Goal Selection (PT-IRF) transfers, PT goal 1  -RF     Gait (Walking Locomotion) Goal Selection (PT-IRF) gait, PT goal 1  -RF     Row Name 11/16/22 4123          Bed Mobility Goal 1 (PT-IRF)    Activity/Assistive Device (Bed Mobility Goal 1, PT-IRF) bed mobility activities, all  -RF     [place] Level (Bed Mobility Goal 1, PT-IRF) independent  -RF     Time Frame (Bed Mobility Goal 1, PT-IRF) long-term goal (LTG)  -RF     Progress/Outcomes (Bed Mobility Goal 1, PT-IRF) goal met  -RF     Row Name 11/16/22 1547          Transfer Goal 1 (PT-IRF)    Activity/Assistive Device (Transfer Goal 1, PT-IRF)  sit-to-stand/stand-to-sit;bed-to-chair/chair-to-bed;walker, rolling  -RF     [place] Level (Transfer Goal 1, PT-IRF) modified independence  -RF     Time Frame (Transfer Goal 1, PT-IRF) long-term goal (LTG)  -RF     Progress/Outcomes (Transfer Goal 1, PT-IRF) new goal  -RF     Row Name 11/16/22 1543          Gait/Walking Locomotion Goal 1 (PT-IRF)    Activity/Assistive Device (Gait/Walking Locomotion Goal 1, PT-IRF) gait (walking locomotion);assistive device use  -RF     Gait/Walking Locomotion Distance Goal 1 (PT-IRF) 100'  -RF     [place] Level (Gait/Walking Locomotion Goal 1, PT-IRF) modified independence  -RF     Time Frame (Gait/Walking Locomotion Goal 1, PT-IRF) long-term goal (LTG)  -RF     Progress/Outcomes (Gait/Walking Locomotion Goal 1, PT-IRF) goal revised this date  -RF           User Key  (r) = Recorded By, (t) = Taken By, (c) = Cosigned By    Initials Name Provider Type    RF Meron Lei, PTA Physical Therapist Assistant              Wound 09/21/22 1532 Right anterior knee Incision (Active)   Dressing Appearance open to air 11/16/22 0817   Closure Approximated 11/16/22 0817   Base dry;clean 11/16/22 0817   Periwound intact;dry 11/15/22 2025   Periwound Temperature warm 11/15/22 2025   Periwound Skin Turgor other (see comments) 11/15/22 2025   Drainage Amount none 11/16/22 0817   Care, Wound antimicrobial agent applied 11/16/22 0817   Dressing Care open to air 11/16/22 0817       Wound 10/28/22 1827 Right coccyx Pressure Injury (Active)   Dressing Appearance open to air 11/16/22 0817   Closure Open to air 11/16/22 0817   Base non-blanchable;blanchable;pink 11/16/22 0817   Drainage Amount none 11/16/22 0817   Care, Wound barrier applied;pressure removed 11/16/22 0817   Dressing Care open to air 11/16/22 0817     Physical Therapy Education     Title: PT OT SLP Therapies (Done)     Topic: Physical Therapy (Done)     Point: Mobility training (Done)     Learning Progress Summary            Patient Acceptance, E,TB, VU by RF at 11/16/2022 1606    Acceptance, E,TB, VU by DL at 11/15/2022 2033    Acceptance, E,TB, VU by RF at 11/15/2022 1541    Acceptance, E,TB, VU by DL at 11/15/2022 0305    Acceptance, E,TB, VU by RF at 11/14/2022 1528    Acceptance, E,TB, VU by RF at 11/10/2022 1551    Acceptance, E,TB, VU by RF at 11/9/2022 1622    Acceptance, E,TB, VU by DG at 11/9/2022 0050    Acceptance, E,TB, VU by RF at 11/8/2022 1539    Acceptance, E,TB, VU by DG at 11/8/2022 0114    Acceptance, E,TB, VU by RF at 11/7/2022 1607    Acceptance, E,TB, VU by DG at 11/6/2022 2023    Acceptance, E,TB, VU by RF at 11/4/2022 1551    Acceptance, E,TB, VU,DU by HR at 11/3/2022 1541    Acceptance, E,D, VU,NR by RG at 11/3/2022 1442    Acceptance, E,TB, VU by DG at 11/1/2022 2016    Acceptance, E,D, VU,NR by RG at 11/1/2022 1541    Acceptance, E,TB, VU by DG at 10/31/2022 2315    Acceptance, E,D, VU,NR by RG at 10/31/2022 1446                   Point: Home exercise program (Done)     Learning Progress Summary           Patient Acceptance, E,TB, VU by RF at 11/16/2022 1606    Acceptance, E,TB, VU by DL at 11/15/2022 2033    Acceptance, E,TB, VU by RF at 11/15/2022 1541    Acceptance, E,TB, VU by DL at 11/15/2022 0305    Acceptance, E,TB, VU by RF at 11/14/2022 1528    Acceptance, E,TB, VU by RF at 11/10/2022 1551    Acceptance, E,TB, VU by RF at 11/9/2022 1622    Acceptance, E,TB, VU by DG at 11/9/2022 0050    Acceptance, E,TB, VU by RF at 11/8/2022 1539    Acceptance, E,TB, VU by DG at 11/8/2022 0114    Acceptance, E,TB, VU by RF at 11/7/2022 1607    Acceptance, E,TB, VU by DG at 11/6/2022 2023    Acceptance, E,TB, VU by RF at 11/4/2022 1551    Acceptance, E,TB, VU,DU by HR at 11/3/2022 1541    Acceptance, E,D, VU,NR by RG at 11/3/2022 1442    Acceptance, E,TB, VU by DG at 11/1/2022 2016    Acceptance, E,D, VU,NR by RG at 11/1/2022 1541    Acceptance, E,TB, VU by DG at 10/31/2022 2315    Acceptance, E,D, VU,NR by RG at  10/31/2022 1446                   Point: Body mechanics (Done)     Learning Progress Summary           Patient Acceptance, E,TB, VU by RF at 11/16/2022 1606    Acceptance, E,TB, VU by DL at 11/15/2022 2033    Acceptance, E,TB, VU by RF at 11/15/2022 1541    Acceptance, E,TB, VU by DL at 11/15/2022 0305    Acceptance, E,TB, VU by RF at 11/14/2022 1528    Acceptance, E,TB, VU by RF at 11/10/2022 1551    Acceptance, E,TB, VU by RF at 11/9/2022 1622    Acceptance, E,TB, VU by DG at 11/9/2022 0050    Acceptance, E,TB, VU by RF at 11/8/2022 1539    Acceptance, E,TB, VU by DG at 11/8/2022 0114    Acceptance, E,TB, VU by RF at 11/7/2022 1607    Acceptance, E,TB, VU by DG at 11/6/2022 2023    Acceptance, E,TB, VU by RF at 11/4/2022 1551    Acceptance, E,TB, VU,DU by HR at 11/3/2022 1541    Acceptance, E,D, VU,NR by RG at 11/3/2022 1442    Acceptance, E,TB, VU by DG at 11/1/2022 2016    Acceptance, E,D, VU,NR by RG at 11/1/2022 1541    Acceptance, E,TB, VU by DG at 10/31/2022 2315    Acceptance, E,D, VU,NR by RG at 10/31/2022 1446                   Point: Precautions (Done)     Learning Progress Summary           Patient Acceptance, E,TB, VU by RF at 11/16/2022 1606    Acceptance, E,TB, VU by DL at 11/15/2022 2033    Acceptance, E,TB, VU by RF at 11/15/2022 1541    Acceptance, E,TB, VU by DL at 11/15/2022 0305    Acceptance, E,TB, VU by RF at 11/14/2022 1528    Acceptance, E,TB, VU by RF at 11/10/2022 1551    Acceptance, E,TB, VU by RF at 11/9/2022 1622    Acceptance, E,TB, VU by DG at 11/9/2022 0050    Acceptance, E,TB, VU by RF at 11/8/2022 1539    Acceptance, E,TB, VU by DG at 11/8/2022 0114    Acceptance, E,TB, VU by RF at 11/7/2022 1607    Acceptance, E,TB, VU by DG at 11/6/2022 2023    Acceptance, E,TB, VU by RF at 11/4/2022 1551    Acceptance, E,TB, VU,DU by HR at 11/3/2022 1541    Acceptance, E,D, VU,NR by RG at 11/3/2022 1442    Acceptance, E,TB, VU by DG at 11/1/2022 2016    Acceptance, E,D, VU,NR by RG at 11/1/2022  1541    Acceptance, E,TB, VU by  at 10/31/2022 2315    Acceptance, E,D, VU,NR by  at 10/31/2022 1446                               User Key     Initials Effective Dates Name Provider Type Discipline    DG 06/16/21 -  Phyllis Yañez, RN Registered Nurse Nurse    RF 06/16/21 -  Meron Lei PTA Physical Therapist Assistant PT    RG 06/16/21 -  Michi Mckeon PTA Physical Therapist Assistant PT    HR 01/14/22 -  Hanna Murphy PTA Physical Therapist Assistant PT    DL 03/16/22 -  Hellen Mcdonnell, RN Registered Nurse Nurse                PT Recommendation and Plan    Frequency of Treatment (PT): 5 times per week, 90 minutes per session     Daily Progress Summary (PT)  Functional Goal Overall Progress (PT): progressing toward functional goals as expected  Daily Progress Summary (PT): Fair functional mobility and ambulation quality noted this date; independence hindered due to low back pain. R knee flexion and extension deficits remain, although improving. Continued skilled care required for further improvements to ensure maximum safe function upon D/C.  Impairments Still Limiting Function (PT): functional activity tolerance impairment, pain, range of motion deficit, strength deficit  Recommendations (PT): Continue per current POC               Time Calculation:      PT Charges     Row Name 11/16/22 1606             Time Calculation    Start Time 1315  -RF      Stop Time 1445  -RF      Time Calculation (min) 90 min  -RF      PT Received On 11/16/22  -RF      PT - Next Appointment 11/16/22  -RF      PT Goal Re-Cert Due Date 11/25/22  -RF         Time Calculation- PT    Total Timed Code Minutes- PT 90 minute(s)  -RF            User Key  (r) = Recorded By, (t) = Taken By, (c) = Cosigned By    Initials Name Provider Type    RF Meron Lei, LIZETT Physical Therapist Assistant                Therapy Charges for Today     Code Description Service Date Service Provider Modifiers Qty    95051306178 HC GAIT TRAINING EA  15 MIN 11/15/2022 Meron Lei, PTA GP, CQ 2    84025674243 HC PT THER PROC EA 15 MIN 11/15/2022 Meron Lei, PTA GP, CQ 2    62908956503 HC PT THERAPEUTIC ACT EA 15 MIN 11/15/2022 Meron Lei, PTA GP, CQ 2    88557039115 HC GAIT TRAINING EA 15 MIN 11/16/2022 Meron Lei, PTA GP, CQ 1    23083027771  PT NEUROMUSC RE EDUCATION EA 15 MIN 11/16/2022 Meron Lei, PTA GP, CQ 1    59583182502  PT THER PROC EA 15 MIN 11/16/2022 Meron Lei, PTA GP, CQ 2    [id number]  PT THERAPEUTIC ACT EA 15 MIN 11/16/2022 Meron Lei, PTA GP, CQ 2                   Merontoma Lei, PTA  11/16/2022

## 2022-11-16 NOTE — PROGRESS NOTES
Case Management  Inpatient Rehabilitation Team Conference    Conference Date/Time: 11/15/2022 8:06:19 AM    Team Conference Attendees:  MD Ave Alvares SW Stephanie Partin, RN Kaleb Maguet, PT  Dorothy Rosas OT    Demographics            Age: 57Y            Gender: Male    Admission Date: 10/28/2022 5:02:00 PM  Rehabilitation Diagnosis:  debility  Comorbidities: M00.9 Pyogenic arthritis, unspecified  D69.6 Thrombocytopenia, unspecified  E11.42 Type 2 diabetes mellitus with diabetic polyneuropathy  B95.62 Methicillin resistant Staphylococcus aureus infection as the cause of  diseases classified elsewhere  D64.9 Anemia, unspecified  E03.9 Hypothyroidism, unspecified  I10 Essential (primary) hypertension  K74.60 Unspecified cirrhosis of liver  K75.81 Nonalcoholic steatohepatitis (PAZ)  Z79.4 Long term (current) use of insulin  Z89.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  Medical Necessity Expected Level Rationale: fair-good  Intensity and Duration: an average of 3 hours/5 days per week  Medical Supervision and 24 Hour Rehab Nursing: x  Physical Therapy: x  PT Intensity/Duration: PT 1.5 hours per day/5 days per week  Occupational Therapy: x  OT Intensity/Duration: OT 1.5 hours per day/5 days per week  Social Work: x  Therapeutic Recreation: x  Updated (if changes indicated)    Anticipated Discharge Date/Estimated Length of Stay:   11-22-22      Discharge Plan of Care:    Based on the patient's medical and functional status, their prognosis and  expected level of functional improvement is: good      Interdisciplinary Problem/Goals/Status  Body Function Structure    [RN] Skin Integrity(Active)  Current Status(10/28/2022): impaired skin integrity  Weekly  Goal(11/18/2022): improved skin integrity  Discharge Goal: wound healing        Mobility    [PT] Bed/Chair/Wheelchair(Active)  Current Status(10/29/2022): MI  Weekly Goal(11/05/2022): MI/I  Discharge Goal: I    [PT] Walk(Active)  Current Status(10/29/2022): N/A d/t ortho hypo  Weekly Goal(11/05/2022): 15' RW CGA  Discharge Goal: 30' RW SBA/MI        Pain    [RN] Pain Management(Active)  Current Status(10/28/2022): potential for increased pain  Weekly Goal(11/18/2022): pain at a tolerable level  Discharge Goal: no pain        Safety    [RN] Potential for Injury(Active)  Current Status(10/28/2022): potential for falls  Weekly Goal(11/18/2022): no falls  Discharge Goal: no falls        Self Care    [OT] Dressing (Lower)(Active)  Current Status(11/14/2022): min  Weekly Goal(11/22/2022): SBA  Discharge Goal: SBA    Comments: Pt's discharge date was extended to complete I.V. antibiotics.  Pt  plans to return home with sister and brother-in-law at discharge.    Signed by: KAREN Calderon    Physician CoSigned By: Joe Mike 11/16/2022 08:44:00

## 2022-11-16 NOTE — PROGRESS NOTES
PROGRESS NOTE         Patient Identification:  Name:  Melchor Hardwick III  Age:  57 y.o.  Sex:  male  :  1965  MRN:  [id number]  Visit Number:  [id number]  Primary Care Provider:  Missy Up APRN         LOS: 19 days       ----------------------------------------------------------------------------------------------------------------------  Subjective       Chief Complaints:    No chief complaint on file.        Interval History:      Patient resting comfortably in bed this morning.  No issues or complaints.  Afebrile, denies diarrhea.  No new labs today.    Review of Systems:    Constitutional: no fever, chills and night sweats.  Generalized fatigue.  Eyes: no eye drainage, itching or redness.  HEENT: no mouth sores, dysphagia or nose bleed.  Respiratory: no for shortness of breath, cough or production of sputum.  Cardiovascular: no chest pain, no palpitations, no orthopnea.  Gastrointestinal: no nausea, vomiting or diarrhea. No abdominal pain, hematemesis or rectal bleeding.  Genitourinary: no dysuria or polyuria.  Hematologic/lymphatic: no lymph node abnormalities, no easy bruising or easy bleeding.  Musculoskeletal: no muscle or joint pain.  Stiff right knee.  Chronic back spasms.  Skin: No rash and no itching.  Neurological: no loss of consciousness, no seizure, no headache.  Behavioral/Psych: no depression or suicidal ideation.  Endocrine: no hot flashes.  Immunologic: negative.    ----------------------------------------------------------------------------------------------------------------------      Objective       Current Hospital Meds:  ascorbic acid, 500 mg, Oral, Daily  aspirin, 81 mg, Oral, Daily  DAPTOmycin, 6 mg/kg (Adjusted), Intravenous, Q24H  Diclofenac Sodium, 4 g, Topical, 4x Daily  docusate sodium, 100 mg, Oral, BID  fludrocortisone, 50 mcg, Oral, Daily  fondaparinux, 2.5 mg, Subcutaneous, Q24H  Insulin Aspart, 0-14 Units, Subcutaneous, TID AC  insulin detemir, 18  Units, Subcutaneous, Q12H  iron polysaccharides, 150 mg, Oral, Daily  levothyroxine, 25 mcg, Oral, Q AM  midodrine, 10 mg, Oral, TID AC  multivitamin with minerals, 1 tablet, Oral, Daily  pantoprazole, 40 mg, Oral, Q AM  polyethylene glycol, 17 g, Oral, Daily  pregabalin, 100 mg, Oral, Q12H      Pharmacy Consult,       ----------------------------------------------------------------------------------------------------------------------    Vital Signs:  Temp:  [98 °F (36.7 °C)-98.2 °F (36.8 °C)] 98.2 °F (36.8 °C)  Heart Rate:  [74-99] 74  Resp:  [18] 18  BP: (118-152)/(60-87) 152/87  No data found.  SpO2 Percentage    11/15/22 0855 11/15/22 1900 11/16/22 0804   SpO2: 98% 98% 98%     SpO2:  [98 %] 98 %  on   ;   Device (Oxygen Therapy): room air    Body mass index is 28.34 kg/m².  Wt Readings from Last 3 Encounters:   11/01/22 89.6 kg (197 lb 8.5 oz)   09/23/22 98 kg (216 lb)   06/17/22 115 kg (254 lb)        Intake/Output Summary (Last 24 hours) at 11/16/2022 1118  Last data filed at 11/16/2022 0900  Gross per 24 hour   Intake 1560 ml   Output 1425 ml   Net 135 ml     Diet Regular Texture (IDDSI 7); Regular Consistency; Diabetic Diets; Consistent Carbohydrate  ----------------------------------------------------------------------------------------------------------------------      Physical Exam:    Constitutional: Chronically ill-appearing male resting comfortably in bed with no apparent distress.  HENT:  Head: Normocephalic and atraumatic.  Mouth:  Moist mucous membranes.    Cardiovascular:  Normal rate, regular rhythm and normal heart sounds with no murmur. No edema.  Pulmonary/Chest:  No respiratory distress, no wheezes, no crackles, with normal breath sounds and good air movement.  Abdominal:  Soft.  Bowel sounds are normal.  No distension and no tenderness.   Musculoskeletal:  No edema, no tenderness, and no deformity.  No swelling or redness of joints.  Neurological:  Alert and oriented to person, place, and  time.  No facial droop.  No slurred speech.   Skin:  Skin is warm and dry.  No rash noted.  No pallor.  Right knee surgical incision with good healing and staples removed.  Minimal edema with no erythema or drainage.  Looks significantly improved overall.  Psychiatric:  Normal mood and affect.  Behavior is normal.    ----------------------------------------------------------------------------------------------------------------------  Results from last 7 days   Lab Units 11/14/22 0226   CK TOTAL U/L 18*           Results from last 7 days   Lab Units 11/14/22 0226 11/13/22  0142 11/12/22 0139   CRP mg/dL 5.83*  --  9.69*   WBC 10*3/mm3 2.42* 2.83* 3.37*   HEMOGLOBIN g/dL 8.6* 8.9* 8.6*   HEMATOCRIT % 28.0* 28.3* 28.5*   MCV fL 84.3 82.7 85.1   MCHC g/dL 30.7* 31.4* 30.2*   PLATELETS 10*3/mm3 103* 96* 91*     Results from last 7 days   Lab Units 11/14/22  0226 11/12/22  0139 11/10/22  0102   SODIUM mmol/L 132* 136 137   POTASSIUM mmol/L 3.9 4.0 3.9   CHLORIDE mmol/L 98 101 103   CO2 mmol/L 25.7 24.8 23.7   BUN mg/dL 9 10 11   CREATININE mg/dL 0.68* 0.73* 0.62*   CALCIUM mg/dL 8.1* 8.2* 8.4*   GLUCOSE mg/dL 238* 118* 110*   Estimated Creatinine Clearance: 134.9 mL/min (A) (by C-G formula based on SCr of 0.68 mg/dL (L)).  No results found for: AMMONIA    Glucose   Date/Time Value Ref Range Status   11/16/2022 1057 223 (H) 70 - 130 mg/dL Final     Comment:     Meter: [id number] : [id number] SAEZ JUAN CARLOS   11/16/2022 0619 205 (H) 70 - 130 mg/dL Final     Comment:     Meter: [id number] : [id number] Svetlana Brady LUCIO   11/15/2022 1559 222 (H) 70 - 130 mg/dL Final     Comment:     Meter: [id number] : [id number] SAEZ JUAN CARLOS   11/15/2022 1109 215 (H) 70 - 130 mg/dL Final     Comment:     Meter: [id number] : [id number] SAEZ JUAN CARLOS   11/15/2022 0607 180 (H) 70 - 130 mg/dL Final     Comment:     Meter: [id number] : [id number] ALISSON TORIBIO   11/14/2022 1632 268 (H) 70 - 130 mg/dL Final     Comment:      Meter: [id number] : [id number] Peneloep Dawson   11/14/2022 1051 231 (H) 70 - 130 mg/dL Final     Comment:     Meter: [id number] : [id number] NADJA RANGEL   11/14/2022 0602 163 (H) 70 - 130 mg/dL Final     Comment:     Meter: [id number] : [id number] YAMILET GAMA     Lab Results   Component Value Date    HGBA1C 8.80 (H) 09/20/2022     Lab Results   Component Value Date    TSH 2.390 10/10/2022    FREET4 0.87 (L) 10/10/2022       Blood Culture   Date Value Ref Range Status   10/24/2022 No growth at 5 days  Final   10/24/2022 No growth at 5 days  Final     No results found for: URINECX  No results found for: WOUNDCX  No results found for: STOOLCX  No results found for: RESPCX  Pain Management Panel     Pain Management Panel Latest Ref Rng & Units 5/24/2022 5/11/2022    CREATININE UR mg/dL 91.0 -    AMPHETAMINES SCREEN, URINE Negative - Negative    BARBITURATES SCREEN Negative - Negative    BENZODIAZEPINE SCREEN, URINE Negative - Negative    BUPRENORPHINEUR Negative - Negative    COCAINE SCREEN, URINE Negative - Negative    METHADONE SCREEN, URINE Negative - Negative    METHAMPHETAMINEUR Negative - Negative            ----------------------------------------------------------------------------------------------------------------------  Imaging Results (Last 24 Hours)     ** No results found for the last 24 hours. **          ----------------------------------------------------------------------------------------------------------------------    Pertinent Infectious Disease Results    CPK on 11/6/2022 remains normal at 29.  COVID-19 PCR from 10/28/2022 negative.  Blood cultures from 10/18/2022 2 out of 2 sets positive for MRSA.  Blood cultures from 10/20/2022 2 out of 2 sets positive for MRSA.  Blood cultures from 10/22/2022 1 out of 2 sets positive for MRSA.  Blood cultures from 10/24/2022 finalized as no growth.  2D echo from 10/24/2022 reports no evidence of vegetation.  Updated TYRESE on 11/1/2022  showed a moderate sized echodense structure on the posterior tricuspid leaflet but not attached to the leaflet.  The appearance and location are not typical for regurgitation.  This structure could be a normal variant.  No evidence of tricuspid valve stenosis or significant regurgitations.  Other valves also appear normal in structure with no evidence of stenosis or significant regurgitations.  No vegetation seen on these valves.    Right lower extremity venous duplex negative for DVT.  Bilateral upper extremity venous duplex negative for DVT.         Assessment/Plan       Assessment     Right knee septic arthritis  MRSA bacteremia  Possible endocarditis    Plan      I saw and examined the patient myself this morning and discussed my plan of care with ROSALINA Caballero and primary RN and here are my findings:     Patient resting comfortably in bed this morning with no acute distress, feels very tired no fever reported by nursing staff and no diarrhea with no new labs.  Patient continues on daptomycin through 11/20/2022 monotherapy with follow-up TYRESE on Monday, 11/21/2022 with possible discharge on 11/22/2022.  Overall his knee looks great with no erythema whatsoever no drainage but persistently showing swelling.  No warmth.    Code Status:   Code Status and Medical Interventions:   Ordered at: 11/03/22 1015     Level Of Support Discussed With:    Patient     Code Status (Patient has no pulse and is not breathing):    CPR (Attempt to Resuscitate)     Medical Interventions (Patient has pulse or is breathing):    Full Support     Release to patient:    Routine Release       ROSALINA Caballero  11/16/22  11:18 EST     Electronically signed by ROSALINA Caballero, 11/16/22, 11:19 AM EST.    Electronically signed by Marilynn Giordano MD, 11/16/22, 12:05 PM EST.

## 2022-11-16 NOTE — PROGRESS NOTES
[facility]  PROGRESS NOTE     Patient Identification:  Name:  Melchor Hardwick III  Age:  57 y.o.  Sex:  male  :  1965  MRN:  [id number]  Visit Number:  [id number]  ROOM: 109/2S     Primary Care Provider:  Missy Up APRN    Length of stay in inpatient status:  19    Subjective     Chief Compliant:  No chief complaint on file.      History of Presenting Illness: 57-year-old gentleman being treated for MRSA suspected endocarditis, history of septic knee on the right with bacteremia.  Patient does complain of the low back pain in the specifically in the right paravertebral musculature of the lumbar spine area.  Has no other complaints at this time.  Patient still has restricted range of motion of the right knee    Objective     Current Hospital Meds:ascorbic acid, 500 mg, Oral, Daily  aspirin, 81 mg, Oral, Daily  DAPTOmycin, 6 mg/kg (Adjusted), Intravenous, Q24H  Diclofenac Sodium, 4 g, Topical, 4x Daily  docusate sodium, 100 mg, Oral, BID  fludrocortisone, 50 mcg, Oral, Daily  fondaparinux, 2.5 mg, Subcutaneous, Q24H  Insulin Aspart, 0-14 Units, Subcutaneous, TID AC  insulin detemir, 18 Units, Subcutaneous, Q12H  iron polysaccharides, 150 mg, Oral, Daily  levothyroxine, 25 mcg, Oral, Q AM  midodrine, 10 mg, Oral, TID AC  multivitamin with minerals, 1 tablet, Oral, Daily  pantoprazole, 40 mg, Oral, Q AM  polyethylene glycol, 17 g, Oral, Daily  pregabalin, 100 mg, Oral, Q12H    Pharmacy Consult,       ----------------------------------------------------------------------------------------------------------------------  Vital Signs:  Temp:  [98 °F (36.7 °C)-98.2 °F (36.8 °C)] 98.2 °F (36.8 °C)  Heart Rate:  [74-99] 74  Resp:  [18] 18  BP: (118-152)/(60-87) 152/87  SpO2:  [98 %] 98 %  on   ;   Device (Oxygen Therapy): room air  Body mass index is 28.34 kg/m².    Wt Readings from Last 3 Encounters:   22 89.6 kg (197 lb 8.5 oz)   22 98 kg (216 lb)   22 115 kg (254 lb)     Intake  & Output (last 3 days)       11/13 0701 11/14 0700 11/14 0701  11/15 0700 11/15 0701 11/16 0700 11/16 0701 11/17 0700    P.O. 1200 1680 1320 360    Total Intake(mL/kg) 1200 (13.4) 1680 (18.8) 1320 (14.7) 360 (4)    Urine (mL/kg/hr) 3900 (1.8) 1600 (0.7) 1800 (0.8)     Stool 0 0 0     Total Output 3900 1600 1800     Net -2700 +80 -480 +360            Urine Unmeasured Occurrence  2 x 2 x     Stool Unmeasured Occurrence 1 x 1 x 1 x         Diet Regular Texture (IDDSI 7); Regular Consistency; Diabetic Diets; Consistent Carbohydrate  ----------------------------------------------------------------------------------------------------------------------  Physical exam:  Constitutional:   No acute distress  HEENT: Normocephalic atraumatic  Neck: Supple   Cardiovascular: Regular rate and rhythm  Pulmonary/Chest: Clear to auscultation  Abdominal: Positive bowel sounds soft.   Musculoskeletal: Right knee patient unable to completely extend the knee at this time; patient has tenderness in the right paravertebral musculature of the lumbar spinal area.  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  •  Findings-  •  Left ventricular systolic function is normal. Estimated left ventricular EF = 60%  •  Left atrial appendage is well visualized and is free of thrombus.  •  Saline test was negative for the evidence of shunt in interatrial septum.  •  The tricuspid valve appeared normal  in structure. There was a moderate sized echodense structure seen above  the posterior tricuspid leaflet but not attached to the leaflet. The appearance and location are not typical for regurgitation. This structure could be a normal variant. No evidence of tricuspid valve stenosis or significant regurgitations.  •  Other valves also appear normal in structure with no evidence of stenosis or significant regurgitations.  No vegetations seen on these valves.  •  There is no evidence of pericardial effusion.  •  Comment-  •  In view of presence of MRSA bacteremia, recommend to extend antibiotic therapy for possible infective endocarditis and repeat TYRESE in 3 months.    ----------------------------------------------------------------------------------------------------------------------  Results from last 7 days   Lab Units 11/14/22 0226 11/13/22 0142 11/12/22 0139   CRP mg/dL 5.83*  --  9.69*   WBC 10*3/mm3 2.42* 2.83* 3.37*   HEMOGLOBIN g/dL 8.6* 8.9* 8.6*   HEMATOCRIT % 28.0* 28.3* 28.5*   MCV fL 84.3 82.7 85.1   MCHC g/dL 30.7* 31.4* 30.2*   PLATELETS 10*3/mm3 103* 96* 91*         Results from last 7 days   Lab Units 11/14/22  0226 11/12/22  0139 11/10/22  0102   SODIUM mmol/L 132* 136 137   POTASSIUM mmol/L 3.9 4.0 3.9   CHLORIDE mmol/L 98 101 103   CO2 mmol/L 25.7 24.8 23.7   BUN mg/dL 9 10 11   CREATININE mg/dL 0.68* 0.73* 0.62*   CALCIUM mg/dL 8.1* 8.2* 8.4*   GLUCOSE mg/dL 238* 118* 110*   Estimated Creatinine Clearance: 134.9 mL/min (A) (by C-G formula based on SCr of 0.68 mg/dL (L)).  No results found for: AMMONIA  Results from last 7 days   Lab Units 11/14/22  0226   CK TOTAL U/L 18*             Glucose   Date/Time Value Ref Range Status   11/16/2022 0619 205 (H) 70 - 130 mg/dL Final     Comment:     Meter: [id number] : [id number] Svetlana VALDES   11/15/2022 1559 222 (H) 70 - 130 mg/dL Final     Comment:     Meter: [id number] : [id number] NADJA RANGEL   11/15/2022 1109 215 (H) 70 - 130 mg/dL  Final     Comment:     Meter: [id number] : [id number] NADJA RANGEL   11/15/2022 0607 180 (H) 70 - 130 mg/dL Final     Comment:     Meter: [id number] : [id number] ALISSON TORIBIO   11/14/2022 1632 268 (H) 70 - 130 mg/dL Final     Comment:     Meter: [id number] : [id number] Penelope Dawson   11/14/2022 1051 231 (H) 70 - 130 mg/dL Final     Comment:     Meter: [id number] : [id number] NADJA RANGEL   11/14/2022 0602 163 (H) 70 - 130 mg/dL Final     Comment:     Meter: [id number] : [id number] YAMILET GAMA   11/13/2022 1623 280 (H) 70 - 130 mg/dL Final     Comment:     RN Notified Meter: [id number] : [id number] DAYNE VILLANUEVA     Lab Results   Component Value Date    TSH 2.390 10/10/2022    FREET4 0.87 (L) 10/10/2022     No results found for: PREGTESTUR, PREGSERUM, HCG, HCGQUANT  Pain Management Panel     Pain Management Panel Latest Ref Rng & Units 5/24/2022 5/11/2022    CREATININE UR mg/dL 91.0 -    AMPHETAMINES SCREEN, URINE Negative - Negative    BARBITURATES SCREEN Negative - Negative    BENZODIAZEPINE SCREEN, URINE Negative - Negative    BUPRENORPHINEUR Negative - Negative    COCAINE SCREEN, URINE Negative - Negative    METHADONE SCREEN, URINE Negative - Negative    METHAMPHETAMINEUR Negative - Negative        Brief Urine Lab Results  (Last result in the past 365 days)      Color   Clarity   Blood   Leuk Est   Nitrite   Protein   CREAT   Urine HCG        10/19/22 0746 Yellow   Clear   Negative   Negative   Negative   Negative               Blood Culture   Date Value Ref Range Status   11/12/2022 No growth at 3 days  Preliminary   11/12/2022 No growth at 3 days  Preliminary         No results found for: URINECX  No results found for: WOUNDCX  No results found for: STOOLCX  Results from last 7 days   Lab Units 11/14/22  0226 11/12/22  0139   CRP mg/dL 5.83* 9.69*       I have personally looked at the labs and they are summarized  above.  ----------------------------------------------------------------------------------------------------------------------  Detailed radiology reports for the last 24 hours:    Imaging Results (Last 24 Hours)     ** No results found for the last 24 hours. **        Final impressions for the last 30 days of radiology reports:    Adult Transesophageal Echo (TYRESE) W/ Cont if Necessary Per Protocol    Addendum Date: 11/4/2022    •  Indication for TYRESE -to rule out infective endocarditis in a patient with MRSA bacteremia. •  Findings- •  Left ventricular systolic function is normal. Estimated left ventricular EF = 60% •  Left atrial appendage is well visualized and is free of thrombus. •  Saline test was negative for the evidence of shunt in interatrial septum. •  The tricuspid valve appeared normal in structure. There was a moderate sized echodense structure seen above  the posterior tricuspid leaflet but not attached to the leaflet. The appearance and location are not typical for regurgitation. This structure could be a normal variant. No evidence of tricuspid valve stenosis or significant regurgitations. •  Other valves also appear normal in structure with no evidence of stenosis or significant regurgitations.  No vegetations seen on these valves. •  There is no evidence of pericardial effusion. •  Comment- •  In view of presence of MRSA bacteremia, recommend to extend antibiotic therapy for possible infective endocarditis and repeat TYRESE in 3 months.     Addendum Date: 11/3/2022    •  Indication for TYRESE -to rule out infective endocarditis in a patient with MRSA bacteremia. •  Findings- •  Left ventricular systolic function is normal. Estimated left ventricular EF = 60% •  Left atrial appendage is well visualized and is free of thrombus. •  Saline test was negative for the evidence of shunt in interatrial septum. •  The tricuspid valve appeared normal in structure. There was a moderate sized echodense structure seen  above  the posterior tricuspid leaflet but not attached to the leaflet. The appearance and location are not typical for regurgitation. This structure could be a normal variant. No evidence of tricuspid valve stenosis or significant regurgitations. •  Other valves also appear normal in structure with no evidence of stenosis or significant regurgitations.  No vegetations seen on these valves. •  There is no evidence of pericardial effusion. •  Comment- •  In view of presence of MRSA bacteremia, recommend to extend antibiotic therapy for infective endocarditis and repeat TYRESE in 3 months.     Addendum Date: 11/3/2022    •  Indication for TYRESE -to rule out infective endocarditis in a patient with MRSA bacteremia. •  Findings- •  Left ventricular systolic function is normal. Estimated left ventricular EF = 60% •  Left atrial appendage is well visualized and is free of thrombus. •  Saline test was negative for the evidence of shunt in interatrial septum. •  The tricuspid valve appeared normal in structure. There was a moderate sized echodense structure seen above  the posterior tricuspid leaflet but not attached to the leaflet. The appearance and location are not typical for regurgitation. This structure could be a normal variant. No evidence of tricuspid valve stenosis or significant regurgitations. •  Other valves also appear normal in structure with no evidence of stenosis or significant regurgitations.  No vegetations seen on these valves. •  There is no evidence of pericardial effusion.     Addendum Date: 11/3/2022    •  Indication for TYRESE -to rule out infective endocarditis in a patient with MRSA bacteremia. •  Findings- •  Left ventricular systolic function is normal. Estimated left ventricular EF = 60% •  Left atrial appendage is well visualized and is free of thrombus. •  Saline test was negative for the evidence of shunt in interatrial septum. •  The tricuspid valve appeared normal in structure. There was a  moderate sized echodense structure seen in both the posterior tricuspid leaflet but not attached to the leaflet. The appearance and location are not typical for regurgitation. This structure could be a normal variant. No evidence of tricuspid valve stenosis or significant regurgitations. •  Other valves also appear normal in structure with no evidence of stenosis or significant regurgitations.  No vegetations seen on these valves. •  There is no evidence of pericardial effusion.     XR Chest 1 View    Result Date: 10/18/2022  Probable airspace disease in the right lung with low lung volumes. Right PICC line at the level of diaphragm.  Signer Name: Yaquelin Ferreira MD  Signed: 10/18/2022 9:00 PM  Workstation Name: [email or web address]  [facility] Venous Doppler Lower Extremity Right (duplex)    Result Date: 11/12/2022  No deep venous thrombus in the right lower extremity. Signer Name: Thomas Day MD  Signed: 11/12/2022 11:34 AM  Workstation Name: [employer]  [facility] Venous Doppler Upper Extremity Bilateral (duplex)    Result Date: 11/12/2022  Negative examination.  No evidence of bilateral upper extremity venous thrombosis. Signer Name: Thomas Day MD  Signed: 11/12/2022 11:33 AM  Workstation Name: [employer]  [facility]    I have personally looked at the radiology images and read the final radiology report.    Assessment & Plan    Debility--patient independent for bed mobility; requires contact-guard for transfers; ambulated 120 feet with standby assistance and front wheel walker yesterday; requiring moderate assistance for lower body dressing; set up for grooming.    History of right septic knee arthritis and MRSA bacteremia; likely MRSA and associated endocarditis patient is scheduled continue daptomycin through November 21.  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    VTE Prophylaxis:   Mechanical Order History:     None      Pharmalogical Order History:      Ordered     Dose Route Frequency Stop    10/28/22 1742  fondaparinux (ARIXTRA) injection 2.5 mg         2.5 mg SC Every 24 Hours Scheduled --                    Joe Mike MD  [facility]  11/16/22  10:46 EST

## 2022-11-16 NOTE — PLAN OF CARE
Goal Outcome Evaluation:  Plan of Care Reviewed With: patient resting in bed on my initial assessment, he reports some med changes today due to drowsiness. Continues to participates in therapy, reports feeling stronger each day. Voices no complaints at this time, will continue to monitor.

## 2022-11-17 ENCOUNTER — APPOINTMENT (OUTPATIENT)
Dept: CT IMAGING | Facility: HOSPITAL | Age: 57
End: 2022-11-17

## 2022-11-17 LAB
ANION GAP SERPL CALCULATED.3IONS-SCNC: 7.6 MMOL/L (ref 5–15)
BACTERIA SPEC AEROBE CULT: NORMAL
BACTERIA SPEC AEROBE CULT: NORMAL
BASOPHILS # BLD AUTO: 0.01 10*3/MM3 (ref 0–0.2)
BASOPHILS NFR BLD AUTO: 0.3 % (ref 0–1.5)
BUN SERPL-MCNC: 11 MG/DL (ref 6–20)
BUN/CREAT SERPL: 15.1 (ref 7–25)
CALCIUM SPEC-SCNC: 8.1 MG/DL (ref 8.6–10.5)
CHLORIDE SERPL-SCNC: 100 MMOL/L (ref 98–107)
CO2 SERPL-SCNC: 25.4 MMOL/L (ref 22–29)
CREAT SERPL-MCNC: 0.73 MG/DL (ref 0.76–1.27)
CRP SERPL-MCNC: 6.73 MG/DL (ref 0–0.5)
DEPRECATED RDW RBC AUTO: 51.6 FL (ref 37–54)
EGFRCR SERPLBLD CKD-EPI 2021: 106.1 ML/MIN/1.73
EOSINOPHIL # BLD AUTO: 0.05 10*3/MM3 (ref 0–0.4)
EOSINOPHIL NFR BLD AUTO: 1.6 % (ref 0.3–6.2)
ERYTHROCYTE [DISTWIDTH] IN BLOOD BY AUTOMATED COUNT: 17 % (ref 12.3–15.4)
GLUCOSE BLDC GLUCOMTR-MCNC: 100 MG/DL (ref 70–130)
GLUCOSE BLDC GLUCOMTR-MCNC: 182 MG/DL (ref 70–130)
GLUCOSE BLDC GLUCOMTR-MCNC: 279 MG/DL (ref 70–130)
GLUCOSE BLDC GLUCOMTR-MCNC: 305 MG/DL (ref 70–130)
GLUCOSE SERPL-MCNC: 184 MG/DL (ref 65–99)
HCT VFR BLD AUTO: 27.4 % (ref 37.5–51)
HGB BLD-MCNC: 8.7 G/DL (ref 13–17.7)
IMM GRANULOCYTES # BLD AUTO: 0.01 10*3/MM3 (ref 0–0.05)
IMM GRANULOCYTES NFR BLD AUTO: 0.3 % (ref 0–0.5)
LYMPHOCYTES # BLD AUTO: 0.87 10*3/MM3 (ref 0.7–3.1)
LYMPHOCYTES NFR BLD AUTO: 28 % (ref 19.6–45.3)
MCH RBC QN AUTO: 26.3 PG (ref 26.6–33)
MCHC RBC AUTO-ENTMCNC: 31.8 G/DL (ref 31.5–35.7)
MCV RBC AUTO: 82.8 FL (ref 79–97)
MONOCYTES # BLD AUTO: 0.3 10*3/MM3 (ref 0.1–0.9)
MONOCYTES NFR BLD AUTO: 9.6 % (ref 5–12)
NEUTROPHILS NFR BLD AUTO: 1.87 10*3/MM3 (ref 1.7–7)
NEUTROPHILS NFR BLD AUTO: 60.2 % (ref 42.7–76)
NRBC BLD AUTO-RTO: 0 /100 WBC (ref 0–0.2)
PLATELET # BLD AUTO: 109 10*3/MM3 (ref 140–450)
PMV BLD AUTO: 9.8 FL (ref 6–12)
POTASSIUM SERPL-SCNC: 4 MMOL/L (ref 3.5–5.2)
RBC # BLD AUTO: 3.31 10*6/MM3 (ref 4.14–5.8)
SODIUM SERPL-SCNC: 133 MMOL/L (ref 136–145)
WBC NRBC COR # BLD: 3.11 10*3/MM3 (ref 3.4–10.8)

## 2022-11-17 PROCEDURE — 97530 THERAPEUTIC ACTIVITIES: CPT

## 2022-11-17 PROCEDURE — 97110 THERAPEUTIC EXERCISES: CPT

## 2022-11-17 PROCEDURE — 80048 BASIC METABOLIC PNL TOTAL CA: CPT | Performed by: FAMILY MEDICINE

## 2022-11-17 PROCEDURE — 63710000001 INSULIN ASPART PER 5 UNITS: Performed by: INTERNAL MEDICINE

## 2022-11-17 PROCEDURE — 99231 SBSQ HOSP IP/OBS SF/LOW 25: CPT | Performed by: FAMILY MEDICINE

## 2022-11-17 PROCEDURE — 25010000002 DAPTOMYCIN PER 1 MG: Performed by: INTERNAL MEDICINE

## 2022-11-17 PROCEDURE — 85025 COMPLETE CBC W/AUTO DIFF WBC: CPT | Performed by: FAMILY MEDICINE

## 2022-11-17 PROCEDURE — 86140 C-REACTIVE PROTEIN: CPT | Performed by: INTERNAL MEDICINE

## 2022-11-17 PROCEDURE — 63710000001 INSULIN DETEMIR PER 5 UNITS: Performed by: INTERNAL MEDICINE

## 2022-11-17 PROCEDURE — 25010000002 FONDAPARINUX PER 0.5 MG: Performed by: FAMILY MEDICINE

## 2022-11-17 PROCEDURE — 73700 CT LOWER EXTREMITY W/O DYE: CPT

## 2022-11-17 PROCEDURE — 97535 SELF CARE MNGMENT TRAINING: CPT

## 2022-11-17 PROCEDURE — 73700 CT LOWER EXTREMITY W/O DYE: CPT | Performed by: RADIOLOGY

## 2022-11-17 PROCEDURE — 99232 SBSQ HOSP IP/OBS MODERATE 35: CPT | Performed by: INTERNAL MEDICINE

## 2022-11-17 PROCEDURE — 97116 GAIT TRAINING THERAPY: CPT

## 2022-11-17 PROCEDURE — 82962 GLUCOSE BLOOD TEST: CPT

## 2022-11-17 RX ORDER — LIDOCAINE HYDROCHLORIDE 20 MG/ML
10 INJECTION, SOLUTION INFILTRATION; PERINEURAL ONCE
Status: DISCONTINUED | OUTPATIENT
Start: 2022-11-17 | End: 2022-11-17

## 2022-11-17 RX ORDER — LIDOCAINE HYDROCHLORIDE 20 MG/ML
10 INJECTION, SOLUTION EPIDURAL; INFILTRATION; INTRACAUDAL; PERINEURAL ONCE
Status: DISCONTINUED | OUTPATIENT
Start: 2022-11-17 | End: 2022-11-30 | Stop reason: HOSPADM

## 2022-11-17 RX ADMIN — POLYETHYLENE GLYCOL (3350) 17 G: 17 POWDER, FOR SOLUTION ORAL at 08:20

## 2022-11-17 RX ADMIN — Medication 1 TABLET: at 08:20

## 2022-11-17 RX ADMIN — CARBIDOPA AND LEVODOPA 10 MG: 50; 200 TABLET, EXTENDED RELEASE ORAL at 11:38

## 2022-11-17 RX ADMIN — CYCLOBENZAPRINE 5 MG: 10 TABLET, FILM COATED ORAL at 18:31

## 2022-11-17 RX ADMIN — INSULIN DETEMIR 18 UNITS: 100 INJECTION, SOLUTION SUBCUTANEOUS at 09:42

## 2022-11-17 RX ADMIN — DOCUSATE SODIUM 100 MG: 100 CAPSULE ORAL at 21:24

## 2022-11-17 RX ADMIN — DICLOFENAC 4 G: 10 GEL TOPICAL at 16:56

## 2022-11-17 RX ADMIN — PREGABALIN 100 MG: 100 CAPSULE ORAL at 08:20

## 2022-11-17 RX ADMIN — HYDROCODONE BITARTRATE AND ACETAMINOPHEN 1 TABLET: 5; 325 TABLET ORAL at 08:19

## 2022-11-17 RX ADMIN — Medication 150 MG: at 08:19

## 2022-11-17 RX ADMIN — INSULIN ASPART 3 UNITS: 100 INJECTION, SOLUTION INTRAVENOUS; SUBCUTANEOUS at 11:38

## 2022-11-17 RX ADMIN — PREGABALIN 100 MG: 100 CAPSULE ORAL at 21:24

## 2022-11-17 RX ADMIN — HYDROCODONE BITARTRATE AND ACETAMINOPHEN 1 TABLET: 5; 325 TABLET ORAL at 16:51

## 2022-11-17 RX ADMIN — CYCLOBENZAPRINE 5 MG: 10 TABLET, FILM COATED ORAL at 10:12

## 2022-11-17 RX ADMIN — ASPIRIN 81 MG: 81 TABLET, COATED ORAL at 08:20

## 2022-11-17 RX ADMIN — DICLOFENAC 4 G: 10 GEL TOPICAL at 08:20

## 2022-11-17 RX ADMIN — DICLOFENAC 4 G: 10 GEL TOPICAL at 21:24

## 2022-11-17 RX ADMIN — FONDAPARINUX SODIUM 2.5 MG: 2.5 INJECTION, SOLUTION SUBCUTANEOUS at 08:20

## 2022-11-17 RX ADMIN — DAPTOMYCIN 500 MG: 500 INJECTION, POWDER, LYOPHILIZED, FOR SOLUTION INTRAVENOUS at 18:30

## 2022-11-17 RX ADMIN — OXYCODONE HYDROCHLORIDE AND ACETAMINOPHEN 500 MG: 500 TABLET ORAL at 08:19

## 2022-11-17 RX ADMIN — PANTOPRAZOLE SODIUM 40 MG: 40 TABLET, DELAYED RELEASE ORAL at 06:48

## 2022-11-17 RX ADMIN — LEVOTHYROXINE SODIUM 25 MCG: 0.07 TABLET ORAL at 06:48

## 2022-11-17 RX ADMIN — INSULIN ASPART 10 UNITS: 100 INJECTION, SOLUTION INTRAVENOUS; SUBCUTANEOUS at 16:50

## 2022-11-17 RX ADMIN — CARBIDOPA AND LEVODOPA 10 MG: 50; 200 TABLET, EXTENDED RELEASE ORAL at 06:48

## 2022-11-17 RX ADMIN — ACETAMINOPHEN 650 MG: 325 TABLET, FILM COATED ORAL at 14:26

## 2022-11-17 RX ADMIN — INSULIN DETEMIR 18 UNITS: 100 INJECTION, SOLUTION SUBCUTANEOUS at 21:24

## 2022-11-17 RX ADMIN — DICLOFENAC 4 G: 10 GEL TOPICAL at 12:08

## 2022-11-17 RX ADMIN — FLUDROCORTISONE ACETATE 50 MCG: 0.1 TABLET ORAL at 08:20

## 2022-11-17 RX ADMIN — CARBIDOPA AND LEVODOPA 10 MG: 50; 200 TABLET, EXTENDED RELEASE ORAL at 16:51

## 2022-11-17 RX ADMIN — DOCUSATE SODIUM 100 MG: 100 CAPSULE ORAL at 08:20

## 2022-11-17 NOTE — PROGRESS NOTES
[facility]  PROGRESS NOTE     Patient Identification:  Name:  Melchor Hardwick III  Age:  57 y.o.  Sex:  male  :  1965  MRN:  [id number]  Visit Number:  [id number]  ROOM: 109/     Primary Care Provider:  Missy Up APRN    Length of stay in inpatient status:  20    Subjective     Chief Compliant:  No chief complaint on file.      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  Currently on IV daptomycin.  Patient states he is doing reasonably well states that the right knee is sore but it is really no acute changes.  Did have some mild erythema noted on the lateral portion of the right knee    Objective     Current Hospital Meds:ascorbic acid, 500 mg, Oral, Daily  aspirin, 81 mg, Oral, Daily  DAPTOmycin, 6 mg/kg (Adjusted), Intravenous, Q24H  Diclofenac Sodium, 4 g, Topical, 4x Daily  docusate sodium, 100 mg, Oral, BID  fludrocortisone, 50 mcg, Oral, Daily  fondaparinux, 2.5 mg, Subcutaneous, Q24H  Insulin Aspart, 0-14 Units, Subcutaneous, TID AC  insulin detemir, 18 Units, Subcutaneous, Q12H  iron polysaccharides, 150 mg, Oral, Daily  levothyroxine, 25 mcg, Oral, Q AM  midodrine, 10 mg, Oral, TID AC  multivitamin with minerals, 1 tablet, Oral, Daily  pantoprazole, 40 mg, Oral, Q AM  polyethylene glycol, 17 g, Oral, Daily  pregabalin, 100 mg, Oral, Q12H    Pharmacy Consult,       ----------------------------------------------------------------------------------------------------------------------  Vital Signs:  Temp:  [97.3 °F (36.3 °C)-98.1 °F (36.7 °C)] 97.3 °F (36.3 °C)  Heart Rate:  [75-90] 75  Resp:  [18-20] 18  BP: (126-132)/(60-74) 132/74  SpO2:  [96 %-99 %] 99 %  on   ;   Device (Oxygen Therapy): room air  Body mass index is 28.34 kg/m².    Wt Readings from Last 3 Encounters:   22 89.6 kg (197 lb 8.5 oz)   22 98 kg (216 lb)   22 115 kg (254 lb)     Intake  & Output (last 3 days)       11/14 0701  11/15 0700 11/15 0701 11/16 0700 11/16 0701 11/17 0700 11/17 0701  11/18 0700    P.O. 1680 1320 1200 360    Total Intake(mL/kg) 1680 (18.8) 1320 (14.7) 1200 (13.4) 360 (4)    Urine (mL/kg/hr) 1600 (0.7) 1800 (0.8) 3050 (1.4) 400 (0.8)    Stool 0 0      Total Output 1600 1800 3050 400    Net +80 -480 -1850 -40            Urine Unmeasured Occurrence 2 x 2 x      Stool Unmeasured Occurrence 1 x 1 x          Diet Regular Texture (IDDSI 7); Regular Consistency; Diabetic Diets; Consistent Carbohydrate  ----------------------------------------------------------------------------------------------------------------------  Physical exam:  Constitutional:   No acute distress  HEENT: Normocephalic atraumatic  Neck:    Supple  Cardiovascular: Regular rate and rhythm  Pulmonary/Chest: Clear to auscultation  Abdominal: Positive bowel sounds soft.   Musculoskeletal: Right knee some mild erythema noted in a fairly discrete area in the lateral portion of the knee.  Patient has some trace edema in the right lower extremity which is stable.  Patient states there is no increase in pain  Neurological: No focal deficits  Skin: As above  Peripheral vascular:  Genitourinary:  ----------------------------------------------------------------------------------------------------------------------    Last echocardiogram:  Results for orders placed during the hospital encounter of 10/28/22    Adult Transesophageal Echo (TYRESE) W/ Cont if Necessary Per Protocol    Interpretation Summary  •  Indication for TYRESE -to rule out infective endocarditis in a patient with MRSA bacteremia.  •  Findings-  •  Left ventricular systolic function is normal. Estimated left ventricular EF = 60%  •  Left atrial appendage is well visualized and is free of thrombus.  •  Saline test was negative for the evidence of shunt in interatrial septum.  •  The tricuspid valve appeared normal in structure. There was a moderate sized  echodense structure seen above  the posterior tricuspid leaflet but not attached to the leaflet. The appearance and location are not typical for regurgitation. This structure could be a normal variant. No evidence of tricuspid valve stenosis or significant regurgitations.  •  Other valves also appear normal in structure with no evidence of stenosis or significant regurgitations.  No vegetations seen on these valves.  •  There is no evidence of pericardial effusion.  •  Comment-  •  In view of presence of MRSA bacteremia, recommend to extend antibiotic therapy for possible infective endocarditis and repeat TYRESE in 3 months.    ----------------------------------------------------------------------------------------------------------------------  Results from last 7 days   Lab Units 11/17/22 0131 11/14/22 0226 11/13/22 0142 11/12/22 0139   CRP mg/dL 6.73* 5.83*  --  9.69*   WBC 10*3/mm3 3.11* 2.42* 2.83* 3.37*   HEMOGLOBIN g/dL 8.7* 8.6* 8.9* 8.6*   HEMATOCRIT % 27.4* 28.0* 28.3* 28.5*   MCV fL 82.8 84.3 82.7 85.1   MCHC g/dL 31.8 30.7* 31.4* 30.2*   PLATELETS 10*3/mm3 109* 103* 96* 91*         Results from last 7 days   Lab Units 11/17/22 0131 11/14/22 0226 11/12/22 0139   SODIUM mmol/L 133* 132* 136   POTASSIUM mmol/L 4.0 3.9 4.0   CHLORIDE mmol/L 100 98 101   CO2 mmol/L 25.4 25.7 24.8   BUN mg/dL 11 9 10   CREATININE mg/dL 0.73* 0.68* 0.73*   CALCIUM mg/dL 8.1* 8.1* 8.2*   GLUCOSE mg/dL 184* 238* 118*   Estimated Creatinine Clearance: 125.7 mL/min (A) (by C-G formula based on SCr of 0.73 mg/dL (L)).  No results found for: AMMONIA  Results from last 7 days   Lab Units 11/14/22  0226   CK TOTAL U/L 18*             Glucose   Date/Time Value Ref Range Status   11/17/2022 1105 182 (H) 70 - 130 mg/dL Final     Comment:     Meter: [id number] : [id number] lisa ramirez   11/17/2022 0632 100 70 - 130 mg/dL Final     Comment:     Meter: [id number] : [id number] YAMILET GAMA   11/16/2022 2028 267 (H) 70 - 130  mg/dL Final     Comment:     Meter: [id number] : [id number] YAMILET GAMA   11/16/2022 1605 242 (H) 70 - 130 mg/dL Final     Comment:     Meter: [id number] : [id number] mark torres   11/16/2022 1057 223 (H) 70 - 130 mg/dL Final     Comment:     Meter: [id number] : [id number] SAEZ JUAN CARLOS   11/16/2022 0619 205 (H) 70 - 130 mg/dL Final     Comment:     Meter: [id number] : [id number] Svetlana VALDES   11/15/2022 1559 222 (H) 70 - 130 mg/dL Final     Comment:     Meter: [id number] : [id number] SAEZ JUAN CARLOS   11/15/2022 1109 215 (H) 70 - 130 mg/dL Final     Comment:     Meter: [id number] : [id number] SAEZ JUAN CARLOS     Lab Results   Component Value Date    TSH 2.390 10/10/2022    FREET4 0.87 (L) 10/10/2022     No results found for: PREGTESTUR, PREGSERUM, HCG, HCGQUANT  Pain Management Panel     Pain Management Panel Latest Ref Rng & Units 5/24/2022 5/11/2022    CREATININE UR mg/dL 91.0 -    AMPHETAMINES SCREEN, URINE Negative - Negative    BARBITURATES SCREEN Negative - Negative    BENZODIAZEPINE SCREEN, URINE Negative - Negative    BUPRENORPHINEUR Negative - Negative    COCAINE SCREEN, URINE Negative - Negative    METHADONE SCREEN, URINE Negative - Negative    METHAMPHETAMINEUR Negative - Negative        Brief Urine Lab Results  (Last result in the past 365 days)      Color   Clarity   Blood   Leuk Est   Nitrite   Protein   CREAT   Urine HCG        10/19/22 0746 Yellow   Clear   Negative   Negative   Negative   Negative               Blood Culture   Date Value Ref Range Status   11/12/2022 No growth at 5 days  Final   11/12/2022 No growth at 5 days  Final         No results found for: URINECX  No results found for: WOUNDCX  No results found for: STOOLCX  Results from last 7 days   Lab Units 11/17/22  0131 11/14/22  0226 11/12/22  0139   CRP mg/dL 6.73* 5.83* 9.69*       I have personally looked at the labs and they are summarized  above.  ----------------------------------------------------------------------------------------------------------------------  Detailed radiology reports for the last 24 hours:    Imaging Results (Last 24 Hours)     ** No results found for the last 24 hours. **        Final impressions for the last 30 days of radiology reports:    Adult Transesophageal Echo (TYRESE) W/ Cont if Necessary Per Protocol    Addendum Date: 11/4/2022    •  Indication for TYRESE -to rule out infective endocarditis in a patient with MRSA bacteremia. •  Findings- •  Left ventricular systolic function is normal. Estimated left ventricular EF = 60% •  Left atrial appendage is well visualized and is free of thrombus. •  Saline test was negative for the evidence of shunt in interatrial septum. •  The tricuspid valve appeared normal in structure. There was a moderate sized echodense structure seen above  the posterior tricuspid leaflet but not attached to the leaflet. The appearance and location are not typical for regurgitation. This structure could be a normal variant. No evidence of tricuspid valve stenosis or significant regurgitations. •  Other valves also appear normal in structure with no evidence of stenosis or significant regurgitations.  No vegetations seen on these valves. •  There is no evidence of pericardial effusion. •  Comment- •  In view of presence of MRSA bacteremia, recommend to extend antibiotic therapy for possible infective endocarditis and repeat TYRESE in 3 months.     Addendum Date: 11/3/2022    •  Indication for TYRESE -to rule out infective endocarditis in a patient with MRSA bacteremia. •  Findings- •  Left ventricular systolic function is normal. Estimated left ventricular EF = 60% •  Left atrial appendage is well visualized and is free of thrombus. •  Saline test was negative for the evidence of shunt in interatrial septum. •  The tricuspid valve appeared normal in structure. There was a moderate sized echodense structure seen  above  the posterior tricuspid leaflet but not attached to the leaflet. The appearance and location are not typical for regurgitation. This structure could be a normal variant. No evidence of tricuspid valve stenosis or significant regurgitations. •  Other valves also appear normal in structure with no evidence of stenosis or significant regurgitations.  No vegetations seen on these valves. •  There is no evidence of pericardial effusion. •  Comment- •  In view of presence of MRSA bacteremia, recommend to extend antibiotic therapy for infective endocarditis and repeat TYRESE in 3 months.     Addendum Date: 11/3/2022    •  Indication for TYRESE -to rule out infective endocarditis in a patient with MRSA bacteremia. •  Findings- •  Left ventricular systolic function is normal. Estimated left ventricular EF = 60% •  Left atrial appendage is well visualized and is free of thrombus. •  Saline test was negative for the evidence of shunt in interatrial septum. •  The tricuspid valve appeared normal in structure. There was a moderate sized echodense structure seen above  the posterior tricuspid leaflet but not attached to the leaflet. The appearance and location are not typical for regurgitation. This structure could be a normal variant. No evidence of tricuspid valve stenosis or significant regurgitations. •  Other valves also appear normal in structure with no evidence of stenosis or significant regurgitations.  No vegetations seen on these valves. •  There is no evidence of pericardial effusion.     Addendum Date: 11/3/2022    •  Indication for TYRESE -to rule out infective endocarditis in a patient with MRSA bacteremia. •  Findings- •  Left ventricular systolic function is normal. Estimated left ventricular EF = 60% •  Left atrial appendage is well visualized and is free of thrombus. •  Saline test was negative for the evidence of shunt in interatrial septum. •  The tricuspid valve appeared normal in structure. There was a  moderate sized echodense structure seen in both the posterior tricuspid leaflet but not attached to the leaflet. The appearance and location are not typical for regurgitation. This structure could be a normal variant. No evidence of tricuspid valve stenosis or significant regurgitations. •  Other valves also appear normal in structure with no evidence of stenosis or significant regurgitations.  No vegetations seen on these valves. •  There is no evidence of pericardial effusion.     XR Chest 1 View    Result Date: 10/18/2022  Probable airspace disease in the right lung with low lung volumes. Right PICC line at the level of diaphragm.  Signer Name: Yaquelin Ferreira MD  Signed: 10/18/2022 9:00 PM  Workstation Name: [email or web address]  [facility] Venous Doppler Lower Extremity Right (duplex)    Result Date: 11/12/2022  No deep venous thrombus in the right lower extremity. Signer Name: Thomas Day MD  Signed: 11/12/2022 11:34 AM  Workstation Name: MD On-Line  [facility] Venous Doppler Upper Extremity Bilateral (duplex)    Result Date: 11/12/2022  Negative examination.  No evidence of bilateral upper extremity venous thrombosis. Signer Name: Thomas Day MD  Signed: 11/12/2022 11:33 AM  Workstation Name: MD On-Line  [facility]    I have personally looked at the radiology images and read the final radiology report.    Assessment & Plan    Right septic knee--patient's been getting daptomycin has had washout x2.  Discussed with infectious disease and at this time we will obtain CT scan of the right knee for follow-up.  Repeat CRP in the a.m.    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  Standby assist for transfers.  Ambulated 160 feet with front wheel walker and standby assistance.  Set up for bathing; moderate  assistance for lower body dressing;    Pancytopenia likely secondary to cirrhosis    Diabetes mellitus continue current treatment    Lumbago improved    Orthostatic hypotension continue midodrine and Florinef.    VTE Prophylaxis:   Mechanical Order History:     None      Pharmalogical Order History:      Ordered     Dose Route Frequency Stop    10/28/22 6412  fondaparinux (ARIXTRA) injection 2.5 mg         2.5 mg SC Every 24 Hours Scheduled --                  Joe Mike MD  [facility]  11/17/22  12:28 EST

## 2022-11-17 NOTE — PROGRESS NOTES
PROGRESS NOTE         Patient Identification:  Name:  Melchor Hardwick III  Age:  57 y.o.  Sex:  male  :  1965  MRN:  [id number]  Visit Number:  [id number]  Primary Care Provider:  Missy Up APRN         LOS: 20 days       ----------------------------------------------------------------------------------------------------------------------  Subjective       Chief Complaints:    No chief complaint on file.        Interval History:      Patient is sitting up in bed on room air in no apparent distress. Reports that he is feeling well this morning. States that his right knee feels stiff, but there is no pain. He has been participating in physical therapy regularly.  Afebrile, no diarrhea.  WBC remains low but is slightly improved at 3.11.  Platelets remain low at 109,000.  Blood cultures on 2022 are so far showing no growth.    Review of Systems:    Constitutional: no fever, chills and night sweats.  Generalized fatigue.  Eyes: no eye drainage, itching or redness.  HEENT: no mouth sores, dysphagia or nose bleed.  Respiratory: no for shortness of breath, cough or production of sputum.  Cardiovascular: no chest pain, no palpitations, no orthopnea.  Gastrointestinal: no nausea, vomiting or diarrhea. No abdominal pain, hematemesis or rectal bleeding.  Genitourinary: no dysuria or polyuria.  Hematologic/lymphatic: no lymph node abnormalities, no easy bruising or easy bleeding.  Musculoskeletal: no muscle or joint pain.  Stiff right knee.  Chronic back spasms.  Skin: No rash and no itching.  Neurological: no loss of consciousness, no seizure, no headache.  Behavioral/Psych: no depression or suicidal ideation.  Endocrine: no hot flashes.  Immunologic: negative.    ----------------------------------------------------------------------------------------------------------------------      Objective       Current [facility] Meds:  ascorbic acid, 500 mg, Oral, Daily  aspirin, 81 mg, Oral,  Daily  DAPTOmycin, 6 mg/kg (Adjusted), Intravenous, Q24H  Diclofenac Sodium, 4 g, Topical, 4x Daily  docusate sodium, 100 mg, Oral, BID  fludrocortisone, 50 mcg, Oral, Daily  fondaparinux, 2.5 mg, Subcutaneous, Q24H  Insulin Aspart, 0-14 Units, Subcutaneous, TID AC  insulin detemir, 18 Units, Subcutaneous, Q12H  iron polysaccharides, 150 mg, Oral, Daily  levothyroxine, 25 mcg, Oral, Q AM  midodrine, 10 mg, Oral, TID AC  multivitamin with minerals, 1 tablet, Oral, Daily  pantoprazole, 40 mg, Oral, Q AM  polyethylene glycol, 17 g, Oral, Daily  pregabalin, 100 mg, Oral, Q12H      Pharmacy Consult,       ----------------------------------------------------------------------------------------------------------------------    Vital Signs:  Temp:  [97.3 °F (36.3 °C)-98.1 °F (36.7 °C)] 97.3 °F (36.3 °C)  Heart Rate:  [75-90] 75  Resp:  [18-20] 18  BP: (126-132)/(60-74) 132/74  No data found.  SpO2 Percentage    11/16/22 0804 11/16/22 1900 11/17/22 0739   SpO2: 98% 96% 99%     SpO2:  [96 %-99 %] 99 %  on   ;   Device (Oxygen Therapy): room air    Body mass index is 28.34 kg/m².  Wt Readings from Last 3 Encounters:   11/01/22 89.6 kg (197 lb 8.5 oz)   09/23/22 98 kg (216 lb)   06/17/22 115 kg (254 lb)        Intake/Output Summary (Last 24 hours) at 11/17/2022 0958  Last data filed at 11/17/2022 0900  Gross per 24 hour   Intake 1200 ml   Output 3450 ml   Net -2250 ml     Diet Regular Texture (IDDSI 7); Regular Consistency; Diabetic Diets; Consistent Carbohydrate  ----------------------------------------------------------------------------------------------------------------------      Physical Exam:    Constitutional: Chronically ill-appearing male is sitting up in bed on room air in no apparent distress.  HENT:  Head: Normocephalic and atraumatic.  Mouth:  Moist mucous membranes.    Cardiovascular:  Normal rate, regular rhythm and normal heart sounds with no murmur. No edema.  Pulmonary/Chest:  No respiratory distress, no  wheezes, no crackles, with normal breath sounds and good air movement.  Abdominal:  Soft.  Bowel sounds are normal.  No distension and no tenderness.   Musculoskeletal:  No edema, no tenderness, and no deformity.  No swelling or redness of joints.  Neurological:  Alert and oriented to person, place, and time.  No facial droop.  No slurred speech.   Skin:  Skin is warm and dry.  No rash noted.  No pallor.  Right knee surgical incision with good healing and staples removed.  Minimal edema with no erythema or drainage.  Overall showing very good healing.  Psychiatric:  Normal mood and affect.  Behavior is normal.    ----------------------------------------------------------------------------------------------------------------------  Results from last 7 days   Lab Units 11/14/22 0226   CK TOTAL U/L 18*           Results from last 7 days   Lab Units 11/17/22 0131 11/14/22 0226 11/13/22 0142 11/12/22 0139   CRP mg/dL 6.73* 5.83*  --  9.69*   WBC 10*3/mm3 3.11* 2.42* 2.83* 3.37*   HEMOGLOBIN g/dL 8.7* 8.6* 8.9* 8.6*   HEMATOCRIT % 27.4* 28.0* 28.3* 28.5*   MCV fL 82.8 84.3 82.7 85.1   MCHC g/dL 31.8 30.7* 31.4* 30.2*   PLATELETS 10*3/mm3 109* 103* 96* 91*     Results from last 7 days   Lab Units 11/17/22 0131 11/14/22 0226 11/12/22 0139   SODIUM mmol/L 133* 132* 136   POTASSIUM mmol/L 4.0 3.9 4.0   CHLORIDE mmol/L 100 98 101   CO2 mmol/L 25.4 25.7 24.8   BUN mg/dL 11 9 10   CREATININE mg/dL 0.73* 0.68* 0.73*   CALCIUM mg/dL 8.1* 8.1* 8.2*   GLUCOSE mg/dL 184* 238* 118*   Estimated Creatinine Clearance: 125.7 mL/min (A) (by C-G formula based on SCr of 0.73 mg/dL (L)).  No results found for: AMMONIA    Glucose   Date/Time Value Ref Range Status   11/17/2022 0632 100 70 - 130 mg/dL Final     Comment:     Meter: [id number] : [id number] YAMILET NATAN   11/16/2022 2028 267 (H) 70 - 130 mg/dL Final     Comment:     Meter: [id number] : [id number] YAMILET NATAN   11/16/2022 1605 242 (H) 70 - 130 mg/dL Final      Comment:     Meter: [id number] : [id number] mark torres   11/16/2022 1057 223 (H) 70 - 130 mg/dL Final     Comment:     Meter: [id number] : [id number] SAEZ JUAN CARLOS   11/16/2022 0619 205 (H) 70 - 130 mg/dL Final     Comment:     Meter: [id number] : [id number] Svetlana VALDES   11/15/2022 1559 222 (H) 70 - 130 mg/dL Final     Comment:     Meter: [id number] : [id number] SAEZ JUAN CARLOS   11/15/2022 1109 215 (H) 70 - 130 mg/dL Final     Comment:     Meter: [id number] : [id number] SAEZ JUAN CARLOS   11/15/2022 0607 180 (H) 70 - 130 mg/dL Final     Comment:     Meter: [id number] : [id number] ALISSON TORIBIO     Lab Results   Component Value Date    HGBA1C 8.80 (H) 09/20/2022     Lab Results   Component Value Date    TSH 2.390 10/10/2022    FREET4 0.87 (L) 10/10/2022       Blood Culture   Date Value Ref Range Status   10/24/2022 No growth at 5 days  Final   10/24/2022 No growth at 5 days  Final     No results found for: URINECX  No results found for: WOUNDCX  No results found for: STOOLCX  No results found for: RESPCX  Pain Management Panel     Pain Management Panel Latest Ref Rng & Units 5/24/2022 5/11/2022    CREATININE UR mg/dL 91.0 -    AMPHETAMINES SCREEN, URINE Negative - Negative    BARBITURATES SCREEN Negative - Negative    BENZODIAZEPINE SCREEN, URINE Negative - Negative    BUPRENORPHINEUR Negative - Negative    COCAINE SCREEN, URINE Negative - Negative    METHADONE SCREEN, URINE Negative - Negative    METHAMPHETAMINEUR Negative - Negative            ----------------------------------------------------------------------------------------------------------------------  Imaging Results (Last 24 Hours)     ** No results found for the last 24 hours. **          ----------------------------------------------------------------------------------------------------------------------    Pertinent Infectious Disease Results    CPK on 11/6/2022 remains normal at 29.  COVID-19 PCR from 10/28/2022  negative.  Blood cultures from 10/18/2022 2 out of 2 sets positive for MRSA.  Blood cultures from 10/20/2022 2 out of 2 sets positive for MRSA.  Blood cultures from 10/22/2022 1 out of 2 sets positive for MRSA.  Blood cultures from 10/24/2022 finalized as no growth.  2D echo from 10/24/2022 reports no evidence of vegetation.  Updated TYRESE on 11/1/2022 showed a moderate sized echodense structure on the posterior tricuspid leaflet but not attached to the leaflet.  The appearance and location are not typical for regurgitation.  This structure could be a normal variant.  No evidence of tricuspid valve stenosis or significant regurgitations.  Other valves also appear normal in structure with no evidence of stenosis or significant regurgitations.  No vegetation seen on these valves.    Right lower extremity venous duplex negative for DVT.  Bilateral upper extremity venous duplex negative for DVT.     Patient is sitting up in bed on room air in no apparent distress. Reports that he is feeling well this morning. States that his right knee feels stiff, but there is no pain. He has been participating in physical therapy regularly.  Afebrile, no diarrhea.  WBC remains low but is slightly improved at 3.11.  Platelets remain low at 109,000.  Blood cultures on 11/17/2022 are so far showing no growth.  Remains on daptomycin through 11/20/2022.  Has a follow-up TYRESE scheduled on Monday, 11/21/2022, with possible discharge on 11/22/2022.    Assessment/Plan       Assessment     Right knee septic arthritis  MRSA bacteremia  Possible endocarditis    Plan      Patient seems to be stable from infectious disease standpoint and overall feeling well.  No fever or diarrhea reported overnight and WBC remains low but slightly improved at 3.11 and persistent thrombocytopenia at 109,000.  Blood cultures from 11/12/2022 so far show no growth and tolerating daptomycin without any adverse reaction.    I added a CRP level on today's labs and it is at  6.73 which is stable from 5.83 and patient is scheduled for repeat TYRESE on Monday, 11/21/2022.    Code Status:   Code Status and Medical Interventions:   Ordered at: 11/03/22 1015     Level Of Support Discussed With:    Patient     Code Status (Patient has no pulse and is not breathing):    CPR (Attempt to Resuscitate)     Medical Interventions (Patient has pulse or is breathing):    Full Support     Release to patient:    Routine Release       Sara Sales PA-C  11/17/22  09:53 EST     Electronically signed by Sara Sales PA-C, 11/17/22, 9:56 AM EST.      Electronically signed by Marilynn Giordano MD, 11/17/22, 9:59 AM EST.

## 2022-11-17 NOTE — PLAN OF CARE
Goal Outcome Evaluation:  Plan of Care Reviewed With: patient  resting in bed, had family visiting at the beginning of the shift. Reports progressing well in therapy, will continue to monitor.

## 2022-11-17 NOTE — PAYOR COMM NOTE
"Jaja Ramirez RN  Utilization Review Nurse for Inpatient Rehab  Phone: [phone]  Fax: [phone]  Email: [email or web address]  [facility] NPI: [id number]    CLINICAL REVIEW FOR CONTINUED REHAB STAY APPROVAL  Member: Melchor Hardwick  : 1965  Ref# [id number]  ID# [id number]  Admission Date: 10/28/22  Planning for Discharge home on 22  *Requesting additional 5 days    Melchor Hardwick III (57 y.o. Male)     Date of Birth   1965    Social Security Number       Address   [address] [address]    Home Phone   [phone]    MRN   [id number]       [employer]ism   [facility]    Marital Status   Single                            Admission Date   10/28/22    Admission Type   Elective    Admitting Provider   Ja Bowers MD    Attending Provider   Ja Bowers MD    Department, Room/Bed   [facility], 109/2S       Discharge Date       Discharge Disposition       Discharge Destination                               Attending Provider: Ja Bowers MD    Allergies: No Known Allergies    Isolation: None   Infection: MRSA/History Only (10/17/22)   Code Status: CPR    Ht: 177.8 cm (70\")   Wt: 89.6 kg (197 lb 8.5 oz)    Admission Cmt: None   Principal Problem: Debility [R53.81]               Corrina Mendez MSW     Case Management  Significant Note     Signed  Date of Service:  22  Creation Time:  22     Signed                         22 0950   Plan   Plan SS spoke to sister Ros [phone] about how pt is doing in therapy and plans for discharge on 22.   Sister requests nursing work with pt on using bedside commode instead of bed pan prior to discharge; RN aware.  Sister will come to rehab on 22 at 9:00 am for education with PT/OT.  Pt plans to return home with sister Ros and brother-in-law Suhas White at discharge.  Sister says she is no longer working.  Will follow. "   Patient/Family in Agreement with Plan yes                 Joe Mike MD   Physician  Medicine  Progress Notes     Signed  Date of Service:  22  Creation Time:  22     Signed        Expand All [facility]  PROGRESS NOTE     Patient Identification:  Name:  Melchor Hardwick III  Age:  57 y.o.  Sex:  male  :  1965  MRN:  [id number]  Visit Number:  [id number]  [facility]      Primary Care Provider:  Missy Up APRN     Length of stay in inpatient status:  19     Subjective      Chief Compliant:  No chief complaint on file.        History of Presenting Illness: 57-year-old gentleman being treated for MRSA suspected endocarditis, history of septic knee on the right with bacteremia.  Patient does complain of the low back pain in the specifically in the right paravertebral musculature of the lumbar spine area.  Has no other complaints at this time.  Patient still has restricted range of motion of the right knee     Objective      Current Hospital Meds:ascorbic acid, 500 mg, Oral, Daily  aspirin, 81 mg, Oral, Daily  DAPTOmycin, 6 mg/kg (Adjusted), Intravenous, Q24H  Diclofenac Sodium, 4 g, Topical, 4x Daily  docusate sodium, 100 mg, Oral, BID  fludrocortisone, 50 mcg, Oral, Daily  fondaparinux, 2.5 mg, Subcutaneous, Q24H  Insulin Aspart, 0-14 Units, Subcutaneous, TID AC  insulin detemir, 18 Units, Subcutaneous, Q12H  iron polysaccharides, 150 mg, Oral, Daily  levothyroxine, 25 mcg, Oral, Q AM  midodrine, 10 mg, Oral, TID AC  multivitamin with minerals, 1 tablet, Oral, Daily  pantoprazole, 40 mg, Oral, Q AM  polyethylene glycol, 17 g, Oral, Daily  pregabalin, 100 mg, Oral, Q12H     Pharmacy Consult,         ----------------------------------------------------------------------------------------------------------------------  Vital Signs:  Temp:  [98 °F (36.7 °C)-98.2 °F (36.8 °C)] 98.2 °F (36.8 °C)  Heart Rate:  [74-99] 74  Resp:  [18] 18  BP: (118-152)/(60-87)  152/87  SpO2:  [98 %] 98 %  on   ;   Device (Oxygen Therapy): room air  Body mass index is 28.34 kg/m².         Wt Readings from Last 3 Encounters:   11/01/22 89.6 kg (197 lb 8.5 oz)   09/23/22 98 kg (216 lb)   06/17/22 115 kg (254 lb)               Intake & Output (last 3 days)        11/13 0701 11/14 0700 11/14 0701  11/15 0700 11/15 0701 11/16 0700 11/16 0701  11/17 0700     P.O. 1200 1680 1320 360     Total Intake(mL/kg) 1200 (13.4) 1680 (18.8) 1320 (14.7) 360 (4)     Urine (mL/kg/hr) 3900 (1.8) 1600 (0.7) 1800 (0.8)       Stool 0 0 0       Total Output 3900 1600 1800       Net -2700 +80 -480 +360                   Urine Unmeasured Occurrence   2 x 2 x       Stool Unmeasured Occurrence 1 x 1 x 1 x            Diet Regular Texture (IDDSI 7); Regular Consistency; Diabetic Diets; Consistent Carbohydrate  ----------------------------------------------------------------------------------------------------------------------  Physical exam:  Constitutional:   No acute distress  HEENT: Normocephalic atraumatic  Neck: Supple   Cardiovascular: Regular rate and rhythm  Pulmonary/Chest: Clear to auscultation  Abdominal: Positive bowel sounds soft.   Musculoskeletal: Right knee patient unable to completely extend the knee at this time; patient has tenderness in the right paravertebral musculature of the lumbar spinal area.  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  •   Findings-  •  Left ventricular systolic function is normal. Estimated left ventricular EF = 60%  •  Left atrial appendage is well visualized and is free of thrombus.  •  Saline test was negative for the evidence of shunt in interatrial septum.  •  The tricuspid valve appeared normal in structure. There was a moderate sized echodense structure seen above  the posterior tricuspid leaflet but not attached to the leaflet. The appearance and location are not typical for regurgitation. This structure could be a normal variant. No evidence of tricuspid valve stenosis or significant regurgitations.  •  Other valves also appear normal in structure with no evidence of stenosis or significant regurgitations.  No vegetations seen on these valves.  •  There is no evidence of pericardial effusion.  •  Comment-  •  In view of presence of MRSA bacteremia, recommend to extend antibiotic therapy for possible infective endocarditis and repeat TYRESE in 3 months.     ----------------------------------------------------------------------------------------------------------------------         Results from last 7 days   Lab Units 11/14/22 0226 11/13/22 0142 11/12/22 0139   CRP mg/dL 5.83*  --  9.69*   WBC 10*3/mm3 2.42* 2.83* 3.37*   HEMOGLOBIN g/dL 8.6* 8.9* 8.6*   HEMATOCRIT % 28.0* 28.3* 28.5*   MCV fL 84.3 82.7 85.1   MCHC g/dL 30.7* 31.4* 30.2*   PLATELETS 10*3/mm3 103* 96* 91*                 Results from last 7 days   Lab Units 11/14/22  0226 11/12/22  0139 11/10/22  0102   SODIUM mmol/L 132* 136 137   POTASSIUM mmol/L 3.9 4.0 3.9   CHLORIDE mmol/L 98 101 103   CO2 mmol/L 25.7 24.8 23.7   BUN mg/dL 9 10 11   CREATININE mg/dL 0.68* 0.73* 0.62*   CALCIUM mg/dL 8.1* 8.2* 8.4*   GLUCOSE mg/dL 238* 118* 110*   Estimated Creatinine Clearance: 134.9 mL/min (A) (by C-G formula based on SCr of 0.68 mg/dL (L)).  No results found for: AMMONIA       Results from last 7 days   Lab Units 11/14/22  0226   CK TOTAL U/L 18*                       Glucose   Date/Time Value Ref Range Status   11/16/2022 0619 205 (H) 70 - 130 mg/dL Final       Comment:       Meter: [id number] : [id number] Svetlana VALDES   11/15/2022 1559 222 (H) 70 - 130 mg/dL Final       Comment:       Meter: [id number] : [id number] SAEZ JUAN CARLOS   11/15/2022 1109 215 (H) 70 - 130 mg/dL Final       Comment:       Meter: [id number] : [id number] SAEZ JUAN CARLOS   11/15/2022 0607 180 (H) 70 - 130 mg/dL Final       Comment:       Meter: [id number] : [id number] ALISSON TORIBIO   11/14/2022 1632 268 (H) 70 - 130 mg/dL Final       Comment:       Meter: [id number] : [id number] Penelope Dawson   11/14/2022 1051 231 (H) 70 - 130 mg/dL Final       Comment:       Meter: [id number] : [id number] SAEZ JUAN CARLOS   11/14/2022 0602 163 (H) 70 - 130 mg/dL Final       Comment:       Meter: [id number] : [id number] YAMILET GAMA   11/13/2022 1623 280 (H) 70 - 130 mg/dL Final       Comment:       RN Notified Meter: [id number] : [id number] DAYNE VILLANUEVA            Lab Results   Component Value Date     TSH 2.390 10/10/2022     FREET4 0.87 (L) 10/10/2022      No results found for: PREGTESTUR, PREGSERUM, HCG, HCGQUANT          Pain Management Panel         Pain Management Panel Latest Ref Rng & Units 5/24/2022 5/11/2022     CREATININE UR mg/dL 91.0 -     AMPHETAMINES SCREEN, URINE Negative - Negative     BARBITURATES SCREEN Negative - Negative     BENZODIAZEPINE SCREEN, URINE Negative - Negative     BUPRENORPHINEUR Negative - Negative     COCAINE SCREEN, URINE Negative - Negative     METHADONE SCREEN, URINE Negative - Negative     METHAMPHETAMINEUR Negative - Negative                                   Brief Urine Lab Results  (Last result in the past 365 days)       Color   Clarity   Blood   Leuk Est   Nitrite   Protein   CREAT   Urine HCG         10/19/22 0746 Yellow    Clear    Negative    Negative    Negative    Negative                             Blood Culture   Date Value Ref  Range Status   11/12/2022 No growth at 3 days   Preliminary   11/12/2022 No growth at 3 days   Preliminary          No results found for: URINECX  No results found for: WOUNDCX  No results found for: STOOLCX        Results from last 7 days   Lab Units 11/14/22  0226 11/12/22  0139   CRP mg/dL 5.83* 9.69*         I have personally looked at the labs and they are summarized above.  ----------------------------------------------------------------------------------------------------------------------  Detailed radiology reports for the last 24 hours:         Imaging Results (Last 24 Hours)      ** No results found for the last 24 hours. **          Final impressions for the last 30 days of radiology reports:     Adult Transesophageal Echo (TYRESE) W/ Cont if Necessary Per Protocol     Addendum Date: 11/4/2022    •  Indication for TYRESE -to rule out infective endocarditis in a patient with MRSA bacteremia. •  Findings- •  Left ventricular systolic function is normal. Estimated left ventricular EF = 60% •  Left atrial appendage is well visualized and is free of thrombus. •  Saline test was negative for the evidence of shunt in interatrial septum. •  The tricuspid valve appeared normal in structure. There was a moderate sized echodense structure seen above  the posterior tricuspid leaflet but not attached to the leaflet. The appearance and location are not typical for regurgitation. This structure could be a normal variant. No evidence of tricuspid valve stenosis or significant regurgitations. •  Other valves also appear normal in structure with no evidence of stenosis or significant regurgitations.  No vegetations seen on these valves. •  There is no evidence of pericardial effusion. •  Comment- •  In view of presence of MRSA bacteremia, recommend to extend antibiotic therapy for possible infective endocarditis and repeat TYRESE in 3 months.      Addendum Date: 11/3/2022    •  Indication for TYRESE -to rule out infective  endocarditis in a patient with MRSA bacteremia. •  Findings- •  Left ventricular systolic function is normal. Estimated left ventricular EF = 60% •  Left atrial appendage is well visualized and is free of thrombus. •  Saline test was negative for the evidence of shunt in interatrial septum. •  The tricuspid valve appeared normal in structure. There was a moderate sized echodense structure seen above  the posterior tricuspid leaflet but not attached to the leaflet. The appearance and location are not typical for regurgitation. This structure could be a normal variant. No evidence of tricuspid valve stenosis or significant regurgitations. •  Other valves also appear normal in structure with no evidence of stenosis or significant regurgitations.  No vegetations seen on these valves. •  There is no evidence of pericardial effusion. •  Comment- •  In view of presence of MRSA bacteremia, recommend to extend antibiotic therapy for infective endocarditis and repeat TYRESE in 3 months.      Addendum Date: 11/3/2022    •  Indication for TYRESE -to rule out infective endocarditis in a patient with MRSA bacteremia. •  Findings- •  Left ventricular systolic function is normal. Estimated left ventricular EF = 60% •  Left atrial appendage is well visualized and is free of thrombus. •  Saline test was negative for the evidence of shunt in interatrial septum. •  The tricuspid valve appeared normal in structure. There was a moderate sized echodense structure seen above  the posterior tricuspid leaflet but not attached to the leaflet. The appearance and location are not typical for regurgitation. This structure could be a normal variant. No evidence of tricuspid valve stenosis or significant regurgitations. •  Other valves also appear normal in structure with no evidence of stenosis or significant regurgitations.  No vegetations seen on these valves. •  There is no evidence of pericardial effusion.      Addendum Date: 11/3/2022    •   Indication for TYRESE -to rule out infective endocarditis in a patient with MRSA bacteremia. •  Findings- •  Left ventricular systolic function is normal. Estimated left ventricular EF = 60% •  Left atrial appendage is well visualized and is free of thrombus. •  Saline test was negative for the evidence of shunt in interatrial septum. •  The tricuspid valve appeared normal in structure. There was a moderate sized echodense structure seen in both the posterior tricuspid leaflet but not attached to the leaflet. The appearance and location are not typical for regurgitation. This structure could be a normal variant. No evidence of tricuspid valve stenosis or significant regurgitations. •  Other valves also appear normal in structure with no evidence of stenosis or significant regurgitations.  No vegetations seen on these valves. •  There is no evidence of pericardial effusion.      XR Chest 1 View     Result Date: 10/18/2022  Probable airspace disease in the right lung with low lung volumes. Right PICC line at the level of diaphragm.  Signer Name: Yaquelin Ferreira MD  Signed: 10/18/2022 9:00 PM  Workstation Name: [employer]  Radiology [facility]     US Venous Doppler Lower Extremity Right (duplex)     Result Date: 11/12/2022  No deep venous thrombus in the right lower extremity. Signer Name: Thomas Day MD  Signed: 11/12/2022 11:34 AM  Workstation Name: [email or web address]  Radiology [facility] Venous Doppler Upper Extremity Bilateral (duplex)     Result Date: 11/12/2022  Negative examination.  No evidence of bilateral upper extremity venous thrombosis. Signer Name: Thomas Day MD  Signed: 11/12/2022 11:33 AM  Workstation Name: [email or web address]  Radiology [facility]     I have personally looked at the radiology images and read the final radiology report.     Assessment & Plan    Debility--patient independent for bed mobility; requires contact-guard for transfers; ambulated 120 feet with  standby assistance and front wheel walker yesterday; requiring moderate assistance for lower body dressing; set up for grooming.     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     VTE Prophylaxis:       Mechanical Order History:      None               Pharmalogical Order History:                   Ordered     Dose Route Frequency Stop      10/28/22 1742   fondaparinux (ARIXTRA) injection 2.5 mg         2.5 mg SC Every 24 Hours Scheduled --                           Joe Mike MD  [facility] Hospitalist  22  10:46 Meron Don PTA   Physical Therapist Assistant  Physical Therapy  Therapy Treatment Note     Signed  Date of Service:  22  Creation Time:  22     Signed        Expand All Collapse All                                                                                                                                                                                                                                      Inpatient Rehabilitation - Physical Therapy Treatment Note                                                              [facility]     Patient Name: Melchor Hardwick III                  : 1965                      MRN: [id number]     Today's Date: 2022                                                                                          Admit Date: 10/28/2022                        Visit Dx:   Visit Diagnosis       ICD-10-CM ICD-9-CM   1. Staphylococcal arthritis of right  knee (HCC)  M00.061 711.06       041.10            Problem List       Patient Active Problem List   Diagnosis   • Hyperlipidemia   • Hypertensive disorder   • Obesity   • Type 2 diabetes mellitus with hyperglycemia, with long-term current use of insulin (HCC)   • Gas gangrene of foot (HCC)   • Cellulitis in diabetic foot (HCC)   • Other acute osteomyelitis of left foot (HCC)   • Osteomyelitis (HCC)   • Critical illness myopathy   • Staphylococcal arthritis of right knee (HCC)   • Other cirrhosis of liver (HCC)   • MRSA bacteremia   • Endocarditis of tricuspid valve   • Wound of right buttock   • History of osteomyelitis   • Debility            Medical History        Past Medical History:   Diagnosis Date   • Diabetes mellitus (HCC)     • Hyperlipidemia     • Hypertension     • Pyogenic arthritis of right knee joint, due to unspecified organism (HCC) 09/21/2022            Surgical History         Past Surgical History:   Procedure Laterality Date   • ABSCESS DRAINAGE         PERINEUM   • AMPUTATION FOOT Left 5/18/2022     Procedure: AMPUTATION FOOT;  Surgeon: Addison Colby MD;  Location: [facility];  Service: Podiatry;  Laterality: Left;   • INCISION AND DRAINAGE LEG Left 05/10/2022     Procedure: INCISION AND DRAINAGE LOWER EXTREMITY;  Surgeon: Addison Colby MD;  Location: [facility] OR;  Service: Podiatry;  Laterality: Left;   • KNEE INCISION AND DRAINAGE Right 9/21/2022     Procedure: KNEE INCISION AND DRAINAGE RIGHT;  Surgeon: Brain Bentley MD;  Location: [facility] OR;  Service: Orthopedics;  Laterality: Right;   • WOUND DEBRIDEMENT Left 05/13/2022     Procedure: DEBRIDEMENT FOOT;  Surgeon: Addison Colby MD;  Location: [facility];  Service: Podiatry;  Laterality: Left;                PT ASSESSMENT (last 12 hours)                IRF PT Evaluation and Treatment             Row Name 11/16/22 1543                   PT Time and Intention      Document Type daily treatment  -RF        Mode of Treatment individual  therapy;physical therapy  -RF        Patient/Family/Caregiver Comments/Observations Pt c/o severe low back pain and R knee pain this date.  -RF               Row Name 11/16/22 1543                   General Information      Patient Profile Reviewed yes  -RF        Existing Precautions/Restrictions fall;brace worn when out of bed  air cast LLE  -RF               Row Name 11/16/22 1543                   Cognition/Psychosocial      Affect/Mental Status (Cognition) WFL  -RF        Follows Commands (Cognition) WFL  -RF        Cognitive Function WFL  -RF               Row Name 11/16/22 1543                   Bed Mobility      Bed Mobility bed mobility (all) activities  -RF        All Activities, Carolina (Bed Mobility) modified independence  -RF        Assistive Device (Bed Mobility) bed rails  -RF               Row Name 11/16/22 1543                   Transfer Assessment/Treatment      Transfers bed-chair transfer;chair-bed transfer;sit-stand transfer;stand-sit transfer;car transfer  -RF               Row Name 11/16/22 1543                   Bed-Chair Transfer      Bed-Chair [place] (Transfers) standby assist;supervision  -RF        Assistive Device (Bed-Chair Transfers) walker, front-wheeled  -RF               Row Name 11/16/22 1543                   Chair-Bed Transfer      Chair-Bed [place] (Transfers) standby assist;supervision  -RF        Assistive Device (Chair-Bed Transfers) wheelchair;walker, front-wheeled  -RF               Row Name 11/16/22 1543                   Sit-Stand Transfer      Sit-Stand [place] (Transfers) contact guard;standby assist  -RF        Assistive Device (Sit-Stand Transfers) walker, front-wheeled  -RF               Row Name 11/16/22 1543                   Stand-Sit Transfer      Stand-Sit [place] (Transfers) contact guard;standby assist  -RF        Assistive Device (Stand-Sit Transfers) wheelchair;walker, front-wheeled  -RF               Row Name 11/16/22 1544                    Gait/Stairs (Locomotion)      Gait/Stairs Locomotion gait/ambulation independence;gait/ambulation assistive device;distance ambulated  -RF        [place] Level (Gait) standby assist;supervision  -RF        Assistive Device (Gait) walker, front-wheeled  -RF        Distance in Feet (Gait) 160  -RF        Pattern (Gait) step-to;step-through  -RF        Bilateral Gait Deviations forward flexed posture  -RF        Right Sided Gait Deviations heel strike decreased;weight shift ability decreased  -RF               Row Name 11/16/22 1543                   Safety Issues, Functional Mobility      Impairments Affecting Function (Mobility) balance;endurance/activity tolerance;pain;range of motion (ROM);postural/trunk control  -RF               Row Name 11/16/22 1543                   Balance      Dynamic Standing Balance contact guard;standby assist  bag toss with reach across midline and outside CODIE; no significant LOB noted. Pt required frequent repositioning due to low back pain.  -               Row Name 11/16/22 1543                   Hip (Therapeutic Exercise)      Hip Strengthening (Therapeutic Exercise) bilateral;flexion;extension;aBduction;aDduction;heel slides;marching while seated;marching while standing;mini squats;sitting;standing;2 lb free weight;resistance band;blue;2 sets;10 repetitions  -RF               Row Name 11/16/22 1543                   Knee (Therapeutic Exercise)      Knee PROM (Therapeutic Exercise) right;flexion;extension;sitting;10 second hold;10 repetitions;other (see comments)  skateboard knee flexion with OP, knee extension stretching with foot prop and OP applied.  -RF        Knee Strengthening (Therapeutic Exercise) bilateral;flexion;extension;heel slides;marching while seated;marching while standing;LAQ (long arc quad);hamstring curls;sitting;standing;2 lb free weight;resistance band;blue;2 sets;10 repetitions  -RF               Row Name 11/16/22 1548                   Ankle  (Therapeutic Exercise)      Ankle Strengthening (Therapeutic Exercise) bilateral;dorsiflexion;plantarflexion;sitting;2 sets;10 repetitions  -RF               Row Name 11/16/22 1543                   Aerobic Exercise      Type (Aerobic Exercise) recumbent stationary bike  -RF        Time Performed (Aerobic Exercise) 10  Pt unable to make full revolutions; pt cycled in arching motion for stretching in both flexion and extension on R knee.  -RF        Comment, Aerobic Exercise (Therapeutic Exercise) LVL 1.0  -RF               Row Name 11/16/22 1543                   Positioning and Restraints      Pre-Treatment Position sitting in chair/recliner  -RF        Post Treatment Position bed  -RF        In Bed supine;call light within reach;encouraged to call for assist  -RF               Row Name 11/16/22 1542                   Daily Progress Summary (PT)      Functional Goal Overall Progress (PT) progressing toward functional goals as expected  -RF        Daily Progress Summary (PT) Fair functional mobility and ambulation quality noted this date; independence hindered due to low back pain. R knee flexion and extension deficits remain, although improving. Continued skilled care required for further improvements to ensure maximum safe function upon D/C.  -RF        Impairments Still Limiting Function (PT) functional activity tolerance impairment;pain;range of motion deficit;strength deficit  -RF        Recommendations (PT) Continue per current POC  -RF               Row Name 11/16/22 1543                   IRF PT Goals      Bed Mobility Goal Selection (PT-IRF) bed mobility, PT goal 1  -RF        Transfer Goal Selection (PT-IRF) transfers, PT goal 1  -RF        Gait (Walking Locomotion) Goal Selection (PT-IRF) gait, PT goal 1  -RF               Row Name 11/16/22 1543                   Bed Mobility Goal 1 (PT-IRF)      Activity/Assistive Device (Bed Mobility Goal 1, PT-IRF) bed mobility activities, all  -RF        [place]  Level (Bed Mobility Goal 1, PT-IRF) independent  -RF        Time Frame (Bed Mobility Goal 1, PT-IRF) long-term goal (LTG)  -RF        Progress/Outcomes (Bed Mobility Goal 1, PT-IRF) goal met  -RF               Row Name 11/16/22 1543                   Transfer Goal 1 (PT-IRF)      Activity/Assistive Device (Transfer Goal 1, PT-IRF) sit-to-stand/stand-to-sit;bed-to-chair/chair-to-bed;walker, rolling  -RF        [place] Level (Transfer Goal 1, PT-IRF) modified independence  -RF        Time Frame (Transfer Goal 1, PT-IRF) long-term goal (LTG)  -RF        Progress/Outcomes (Transfer Goal 1, PT-IRF) new goal  -RF               Row Name 11/16/22 1543                   Gait/Walking Locomotion Goal 1 (PT-IRF)      Activity/Assistive Device (Gait/Walking Locomotion Goal 1, PT-IRF) gait (walking locomotion);assistive device use  -RF        Gait/Walking Locomotion Distance Goal 1 (PT-IRF) 100'  -RF        [place] Level (Gait/Walking Locomotion Goal 1, PT-IRF) modified independence  -RF        Time Frame (Gait/Walking Locomotion Goal 1, PT-IRF) long-term goal (LTG)  -RF        Progress/Outcomes (Gait/Walking Locomotion Goal 1, PT-IRF) goal revised this date  -RF                        User Key  (r) = Recorded By, (t) = Taken By, (c) = Cosigned By             Initials Name Provider Type      RF Meron Lei, PTA Physical Therapist Assistant                          Wound 09/21/22 1532 Right anterior knee Incision (Active)   Dressing Appearance open to air 11/16/22 0817   Closure Approximated 11/16/22 0817   Base dry;clean 11/16/22 0817   Periwound intact;dry 11/15/22 2025   Periwound Temperature warm 11/15/22 2025   Periwound Skin Turgor other (see comments) 11/15/22 2025   Drainage Amount none 11/16/22 0817   Care, Wound antimicrobial agent applied 11/16/22 0817   Dressing Care open to air 11/16/22 0817       Wound 10/28/22 1827 Right coccyx Pressure Injury (Active)   Dressing Appearance open to air 11/16/22 0817    Closure Open to air 11/16/22 0817   Base non-blanchable;blanchable;pink 11/16/22 0817   Drainage Amount none 11/16/22 0817   Care, Wound barrier applied;pressure removed 11/16/22 0817   Dressing Care open to air 11/16/22 0817          Physical Therapy Education              Title: PT OT SLP Therapies (Done)                Topic: Physical Therapy (Done)                Point: Mobility training (Done)                Learning Progress Summary                 Patient Acceptance, E,TB, VU by RF at 11/16/2022 1606     Acceptance, E,TB, VU by DL at 11/15/2022 2033     Acceptance, E,TB, VU by RF at 11/15/2022 1541     Acceptance, E,TB, VU by DL at 11/15/2022 0305     Acceptance, E,TB, VU by RF at 11/14/2022 1528     Acceptance, E,TB, VU by RF at 11/10/2022 1551     Acceptance, E,TB, VU by RF at 11/9/2022 1622     Acceptance, E,TB, VU by DG at 11/9/2022 0050     Acceptance, E,TB, VU by RF at 11/8/2022 1539     Acceptance, E,TB, VU by DG at 11/8/2022 0114     Acceptance, E,TB, VU by RF at 11/7/2022 1607     Acceptance, E,TB, VU by DG at 11/6/2022 2023     Acceptance, E,TB, VU by RF at 11/4/2022 1551     Acceptance, E,TB, VU,DU by HR at 11/3/2022 1541     Acceptance, E,D, VU,NR by RG at 11/3/2022 1442     Acceptance, E,TB, VU by DG at 11/1/2022 2016     Acceptance, E,D, VU,NR by RG at 11/1/2022 1541     Acceptance, E,TB, VU by DG at 10/31/2022 2315     Acceptance, E,D, VU,NR by RG at 10/31/2022 1446                                   Point: Home exercise program (Done)                Learning Progress Summary                 Patient Acceptance, E,TB, VU by RF at 11/16/2022 1606     Acceptance, E,TB, VU by DL at 11/15/2022 2033     Acceptance, E,TB, VU by RF at 11/15/2022 1541     Acceptance, E,TB, VU by DL at 11/15/2022 0305     Acceptance, E,TB, VU by RF at 11/14/2022 1528     Acceptance, E,TB, VU by RF at 11/10/2022 1551     Acceptance, E,TB, VU by RF at 11/9/2022 1622     Acceptance, E,TB, VU by DG at 11/9/2022 0050      Acceptance, E,TB, VU by RF at 11/8/2022 1539     Acceptance, E,TB, VU by DG at 11/8/2022 0114     Acceptance, E,TB, VU by RF at 11/7/2022 1607     Acceptance, E,TB, VU by DG at 11/6/2022 2023     Acceptance, E,TB, VU by RF at 11/4/2022 1551     Acceptance, E,TB, VU,DU by HR at 11/3/2022 1541     Acceptance, E,D, VU,NR by RG at 11/3/2022 1442     Acceptance, E,TB, VU by DG at 11/1/2022 2016     Acceptance, E,D, VU,NR by RG at 11/1/2022 1541     Acceptance, E,TB, VU by DG at 10/31/2022 2315     Acceptance, E,D, VU,NR by RG at 10/31/2022 1446                                   Point: Body mechanics (Done)                Learning Progress Summary                 Patient Acceptance, E,TB, VU by RF at 11/16/2022 1606     Acceptance, E,TB, VU by DL at 11/15/2022 2033     Acceptance, E,TB, VU by RF at 11/15/2022 1541     Acceptance, E,TB, VU by DL at 11/15/2022 0305     Acceptance, E,TB, VU by RF at 11/14/2022 1528     Acceptance, E,TB, VU by RF at 11/10/2022 1551     Acceptance, E,TB, VU by RF at 11/9/2022 1622     Acceptance, E,TB, VU by DG at 11/9/2022 0050     Acceptance, E,TB, VU by RF at 11/8/2022 1539     Acceptance, E,TB, VU by DG at 11/8/2022 0114     Acceptance, E,TB, VU by RF at 11/7/2022 1607     Acceptance, E,TB, VU by DG at 11/6/2022 2023     Acceptance, E,TB, VU by RF at 11/4/2022 1551     Acceptance, E,TB, VU,DU by HR at 11/3/2022 1541     Acceptance, E,D, VU,NR by RG at 11/3/2022 1442     Acceptance, E,TB, VU by DG at 11/1/2022 2016     Acceptance, E,D, VU,NR by RG at 11/1/2022 1541     Acceptance, E,TB, VU by DG at 10/31/2022 2315     Acceptance, E,D, VU,NR by RG at 10/31/2022 1446                                   Point: Precautions (Done)                Learning Progress Summary                 Patient Acceptance, E,TB, VU by RF at 11/16/2022 1606     Acceptance, E,TB, VU by DL at 11/15/2022 2033     Acceptance, E,TB, VU by RF at 11/15/2022 1541     Acceptance, E,TB, VU by DL at 11/15/2022 0305      Acceptance, E,TB, VU by RF at 11/14/2022 1528     Acceptance, E,TB, VU by RF at 11/10/2022 1551     Acceptance, E,TB, VU by RF at 11/9/2022 1622     Acceptance, E,TB, VU by DG at 11/9/2022 0050     Acceptance, E,TB, VU by RF at 11/8/2022 1539     Acceptance, E,TB, VU by DG at 11/8/2022 0114     Acceptance, E,TB, VU by RF at 11/7/2022 1607     Acceptance, E,TB, VU by DG at 11/6/2022 2023     Acceptance, E,TB, VU by RF at 11/4/2022 1551     Acceptance, E,TB, VU,DU by HR at 11/3/2022 1541     Acceptance, E,D, VU,NR by RG at 11/3/2022 1442     Acceptance, E,TB, VU by DG at 11/1/2022 2016     Acceptance, E,D, VU,NR by RG at 11/1/2022 1541     Acceptance, E,TB, VU by DG at 10/31/2022 2315     Acceptance, E,D, VU,NR by RG at 10/31/2022 1446                                                 User Key               Initials Effective Dates Name Provider Type Discipline       06/16/21 -  Phyllis Yañez, RN Registered Nurse Nurse       06/16/21 -  Meron Lei PTA Physical Therapist Assistant PT      RG 06/16/21 -  Michi Mckeon PTA Physical Therapist Assistant PT      HR 01/14/22 -  Hanna Murphy PTA Physical Therapist Assistant PT      DL 03/16/22 -  Hellen Mcdonnell, RN Registered Nurse Nurse                        PT Recommendation and Plan     Frequency of Treatment (PT): 5 times per week, 90 minutes per session  Daily Progress Summary (PT)  Functional Goal Overall Progress (PT): progressing toward functional goals as expected  Daily Progress Summary (PT): Fair functional mobility and ambulation quality noted this date; independence hindered due to low back pain. R knee flexion and extension deficits remain, although improving. Continued skilled care required for further improvements to ensure maximum safe function upon D/C.  Impairments Still Limiting Function (PT): functional activity tolerance impairment, pain, range of motion deficit, strength deficit  Recommendations (PT): Continue per current POC                            Time Calculation:                PT Charges              Row Name 22 1606                         Time Calculation      Start Time 1315  -RF          Stop Time 1445  -RF          Time Calculation (min) 90 min  -RF          PT Received On 22  -RF          PT - Next Appointment 22  -RF          PT Goal Re-Cert Due Date 22  -RF                   Time Calculation- PT      Total Timed Code Minutes- PT 90 minute(s)  -RF                             Dorothy Rosas OT   Occupational Therapist  Occupational Therapy  Therapy Treatment Note     Signed  Date of Service:  22  Creation Time:  22     Signed        Expand All Collapse All                                                                                                                                                                                                                                      Inpatient Rehabilitation - Occupational Therapy Treatment Note     YVONNE Eder     Patient Name: Melchor Hardwick III                  : 1965                      MRN: [id number]     Today's Date: 2022                                                                           Admit Date: 10/28/2022        Visit Diagnosis       ICD-10-CM ICD-9-CM   1. Staphylococcal arthritis of right knee (HCC)  M00.061 711.06       041.10            Problem List       Patient Active Problem List   Diagnosis   • Hyperlipidemia   • Hypertensive disorder   • Obesity   • Type 2 diabetes mellitus with hyperglycemia, with long-term current use of insulin (HCC)   • Gas gangrene of foot (HCC)   • Cellulitis in diabetic foot (HCC)   • Other acute osteomyelitis of left foot (HCC)   • Osteomyelitis (HCC)   • Critical illness myopathy   • Staphylococcal arthritis of right knee (HCC)   • Other cirrhosis of liver (HCC)   • MRSA bacteremia   • Endocarditis of tricuspid valve   • Wound of right buttock   • History of osteomyelitis   •  Debility            Medical History        Past Medical History:   Diagnosis Date   • Diabetes mellitus (HCC)     • Hyperlipidemia     • Hypertension     • Pyogenic arthritis of right knee joint, due to unspecified organism (HCC) 09/21/2022            Surgical History         Past Surgical History:   Procedure Laterality Date   • ABSCESS DRAINAGE         PERINEUM   • AMPUTATION FOOT Left 5/18/2022     Procedure: AMPUTATION FOOT;  Surgeon: Addison Colby MD;  Location: [facility] OR;  Service: Podiatry;  Laterality: Left;   • INCISION AND DRAINAGE LEG Left 05/10/2022     Procedure: INCISION AND DRAINAGE LOWER EXTREMITY;  Surgeon: Addison Colby MD;  Location: [facility] OR;  Service: Podiatry;  Laterality: Left;   • KNEE INCISION AND DRAINAGE Right 9/21/2022     Procedure: KNEE INCISION AND DRAINAGE RIGHT;  Surgeon: Brain Bentley MD;  Location:  [facility] OR;  Service: Orthopedics;  Laterality: Right;   • WOUND DEBRIDEMENT Left 05/13/2022     Procedure: DEBRIDEMENT FOOT;  Surgeon: Addison Colby MD;  Location: [facility] OR;  Service: Podiatry;  Laterality: Left;                               IRF OT ASSESSMENT FLOWSHEET (last 12 hours)                IRF OT Evaluation and Treatment             Row Name 11/16/22 1233                   OT Time and Intention      Document Type daily treatment  -HB        Mode of Treatment individual therapy;occupational therapy  -HB        Total Minutes, Occupational Therapy 90  -HB        Patient Effort good  -HB               Row Name 11/16/22 1233                   General Information      Patient Profile Reviewed yes  -HB        Patient/Family/Caregiver Comments/Observations Patient and RN okd OT this date.  -HB        General Observations of Patient Patient tolerated OT well with no adverse reactions. Pt limited by back pain. RN aware per pt  -HB        Existing Precautions/Restrictions fall;brace worn when out of bed  -HB        Limitations/Impairments --  back pain  -HB                Row Name 11/16/22 1233                   Pain Assessment      Pretreatment Pain Rating 6/10  -HB        Posttreatment Pain Rating 6/10  -HB        Pain Location - --  back pain(mostly right side)  -HB        Additional Documentation --  pt said he was pre-medicated prior to tx  -HB               Row Name 11/16/22 1233                   Cognition/Psychosocial      Affect/Mental Status (Cognition) WFL  -HB        Orientation Status (Cognition) oriented x 4  -HB        Follows Commands (Cognition) WFL  -HB               Row Name 11/16/22 1233                   Bathing      Long Level (Bathing) bathing skills;set up  -HB        Position (Bathing) supine  -HB               [facility] Name 11/16/22 1233                   Upper Body Dressing      Long Level (Upper Body Dressing) don;pull over garment  -HB        Position (Upper Body Dressing) supine  -[facility] Name 11/16/22 1233                   Lower Body Dressing      Long Level (Lower Body Dressing) don;doff;pants/bottoms;set up;socks;moderate assist (50% patient effort)  -HB        Position (Lower Body Dressing) supine  -[facility] Name 11/16/22 1233                   Grooming      Long Level (Grooming) grooming skills;set up  -HB        Position (Grooming) supported sitting  -        Set-up Assistance (Grooming) adaptive equipment set-up  -[facility] Name 11/16/22 1233                   Bed Mobility      Supine-Sit Long (Bed Mobility) nonverbal cues (demo/gesture);verbal cues;standby assist  -[facility] Name 11/16/22 1233                   Bed-Chair Transfer      Bed-Chair Long (Transfers) nonverbal cues (demo/gesture);verbal cues;contact guard  -        Assistive Device (Bed-Chair Transfers) walker, front-wheeled  -               Row Name 11/16/22 1233                   Sit-Stand Transfer      Sit-Stand Long (Transfers) contact guard;verbal cues;nonverbal cues  (demo/gesture)  -        Assistive Device (Sit-Stand Transfers) wheelchair;walker, front-wheeled  -HB               Row Name 11/16/22 1233                   Stand-Sit Transfer      Stand-Sit [place] (Transfers) contact guard;standby assist  -        Assistive Device (Stand-Sit Transfers) wheelchair;walker, front-wheeled  -HB               Row Name 11/16/22 1233                   Motor Skills      Motor Skills coordination;functional endurance;motor control/coordination interventions  -        Motor Control/Coordination Interventions fine motor manipulation/dexterity activities;therapeutic exercise/ROM;gross motor coordination activities  -        Therapeutic Exercise shoulder;elbow/forearm;wrist;hand  -        Additional Documentation --  BUE TA to increase ADL status/ endurance; rickshaw 5lbs 4 sets of 20 reps; PRE 15 min ; rest between tasks.  -               Row Name 11/16/22 1233                   Positioning and Restraints      Pre-Treatment Position in bed  -HB        Post Treatment Position wheelchair  -        In Bed call light within reach;encouraged to call for assist  -                        User Key  (r) = Recorded By, (t) = Taken By, (c) = Cosigned By             Initials Name Effective Dates      HB Dorothy Rosas, OT 05/25/21 -                               OT Recommendation and Plan     Planned Therapy Interventions (OT): activity tolerance training, adaptive equipment training, BADL retraining, IADL retraining, patient/caregiver education/training, strengthening exercise, transfer/mobility retraining                          Time Calculation:                Time Calculation- OT              Row Name 11/16/22 1426                         Time Calculation- OT      OT Start Time 0830  -HB          OT Stop Time 1000  -          OT Time Calculation (min) 90 min  -          Total Timed Code Minutes- OT 90 minute(s)  -          OT Non-Billable Time (min) 10 min

## 2022-11-17 NOTE — PROGRESS NOTES
Rehabilitation Nursing  Inpatient Rehabilitation Plan of Care Note    Plan of Care  Pain    Pain Management (Active)  Current Status (10/28/2022 5:07:00 PM): potential for increased pain  Weekly Goal: pain at a tolerable level  Discharge Goal: no pain    Body Function Structure    Skin Integrity (Active)  Current Status (10/28/2022 5:02:00 PM): impaired skin integrity  Weekly Goal: improved skin integrity  Discharge Goal: wound healing    Safety    Potential for Injury (Active)  Current Status (10/28/2022 5:07:00 PM): potential for falls  Weekly Goal: no falls  Discharge Goal: no falls    Signed by: Hellen Mcdonnell RN

## 2022-11-17 NOTE — THERAPY TREATMENT NOTE
Inpatient Rehabilitation - Physical Therapy Treatment Note       YVONNE Gaines     Patient Name: Melchor Hardwick III  : 1965  MRN: [id number]    Today's Date: 2022                    Admit Date: 10/28/2022      Visit Dx:     ICD-10-CM ICD-9-CM   1. Staphylococcal arthritis of right knee (HCC)  M00.061 711.06     041.10       Patient Active Problem List   Diagnosis   • Hyperlipidemia   • Hypertensive disorder   • Obesity   • Type 2 diabetes mellitus with hyperglycemia, with long-term current use of insulin (HCC)   • Gas gangrene of foot (HCC)   • Cellulitis in diabetic foot (HCC)   • Other acute osteomyelitis of left foot (HCC)   • Osteomyelitis (HCC)   • Critical illness myopathy   • Staphylococcal arthritis of right knee (HCC)   • Other cirrhosis of liver (HCC)   • MRSA bacteremia   • Endocarditis of tricuspid valve   • Wound of right buttock   • History of osteomyelitis   • Debility       Past Medical History:   Diagnosis Date   • Diabetes mellitus (HCC)    • Hyperlipidemia    • Hypertension    • Pyogenic arthritis of right knee joint, due to unspecified organism (HCC) 2022       Past Surgical History:   Procedure Laterality Date   • ABSCESS DRAINAGE      PERINEUM   • AMPUTATION FOOT Left 2022    Procedure: AMPUTATION FOOT;  Surgeon: Addison Colby MD;  Location: [facility] OR;  Service: Podiatry;  Laterality: Left;   • INCISION AND DRAINAGE LEG Left 05/10/2022    Procedure: INCISION AND DRAINAGE LOWER EXTREMITY;  Surgeon: Addison Colby MD;  Location:  [facility] OR;  Service: Podiatry;  Laterality: Left;   • KNEE INCISION AND DRAINAGE Right 2022    Procedure: KNEE INCISION AND DRAINAGE RIGHT;  Surgeon: Brain Bentley MD;  Location: [facility] OR;  Service: Orthopedics;  Laterality: Right;   • WOUND DEBRIDEMENT Left 2022    Procedure: DEBRIDEMENT FOOT;  Surgeon: Addison Colby MD;  Location: [facility] OR;  Service: Podiatry;  Laterality: Left;       PT ASSESSMENT (last 12 hours)     IRF PT  Evaluation and Treatment     Row Name 11/17/22 1508          PT Time and Intention    Document Type daily treatment  -RF     Mode of Treatment individual therapy;physical therapy  -RF     Patient/Family/Caregiver Comments/Observations Pt reports low back pain and R knee pain in AM; c/o severe R knee and back pain noted in PM resulting from transfer to ST scan table; RN notified.  -RF     Row Name 11/17/22 1508          General Information    Patient Profile Reviewed yes  -RF     Existing Precautions/Restrictions fall;brace worn when out of bed  air cast LLE  -RF     Row Name 11/17/22 1508          Cognition/Psychosocial    Affect/Mental Status (Cognition) WFL  -RF     Follows Commands (Cognition) WFL  -RF     Cognitive Function WFL  -RF     Row Name 11/17/22 1508          Bed Mobility    Bed Mobility bed mobility (all) activities  -RF     All Activities, [place] (Bed Mobility) modified independence  -RF     Sit-Supine [place] (Bed Mobility) --  required min A in PM due to pain.  -RF     Assistive Device (Bed Mobility) bed rails  -RF     Row Name 11/17/22 1508          Transfer Assessment/Treatment    Transfers bed-chair transfer;chair-bed transfer;sit-stand transfer;stand-sit transfer;car transfer  -RF     Comment, (Transfers) Pt requires increased assistance in PM with sit>stand due to low back and R knee pain.  -RF     Row Name 11/17/22 1508          Bed-Chair Transfer    Bed-Chair [place] (Transfers) standby assist;supervision  -RF     Assistive Device (Bed-Chair Transfers) walker, front-wheeled  -RF     Row Name 11/17/22 1508          Chair-Bed Transfer    Chair-Bed [place] (Transfers) standby assist;supervision  -RF     Assistive Device (Chair-Bed Transfers) wheelchair;walker, front-wheeled  -RF     Row Name 11/17/22 1508          Sit-Stand Transfer    Sit-Stand [place] (Transfers) contact guard;standby assist  -RF     Assistive Device (Sit-Stand Transfers) walker, front-wheeled  -RF      Row Name 11/17/22 1508          Stand-Sit Transfer    Stand-Sit [place] (Transfers) contact guard;standby assist  -RF     Assistive Device (Stand-Sit Transfers) wheelchair;walker, front-wheeled  -RF     Row Name 11/17/22 1508          Gait/Stairs (Locomotion)    Gait/Stairs Locomotion gait/ambulation independence;gait/ambulation assistive device;distance ambulated  -RF     [place] Level (Gait) standby assist;supervision  -RF     Assistive Device (Gait) walker, front-wheeled  -RF     Distance in Feet (Gait) 80, 90 in AM  -RF     Pattern (Gait) step-to;step-through  -RF     Bilateral Gait Deviations forward flexed posture  -RF     Right Sided Gait Deviations heel strike decreased;weight shift ability decreased  -RF     Comment, (Gait/Stairs) Increased forward flexed posture and knee flexion noted with ambualtion. Pt attempted ambulating in parallel bars in PM; unable due to low back and R knee pain.  -RF     Row Name 11/17/22 1508          Safety Issues, Functional Mobility    Impairments Affecting Function (Mobility) balance;endurance/activity tolerance;pain;range of motion (ROM);postural/trunk control  -RF     Row Name 11/17/22 1508          Hip (Therapeutic Exercise)    Hip Strengthening (Therapeutic Exercise) bilateral;flexion;extension;aBduction;aDduction;heel slides;marching while seated;sitting;resistance band;blue;2 sets;10 repetitions  -RF     Row Name 11/17/22 1508          Knee (Therapeutic Exercise)    Knee PROM (Therapeutic Exercise) right;flexion;extension;sitting;10 second hold;10 repetitions;other (see comments)  Sitting knee extension stretch with OP; knee flexion with SB and OP.  -RF     Knee Strengthening (Therapeutic Exercise) bilateral;flexion;extension;heel slides;marching while seated;LAQ (long arc quad);hamstring curls;sitting;resistance band;blue;2 sets;10 repetitions  -RF     Row Name 11/17/22 1508          Ankle (Therapeutic Exercise)    Ankle Strengthening (Therapeutic  Exercise) bilateral;dorsiflexion;plantarflexion;sitting;2 sets;10 repetitions  -RF     Row Name 11/17/22 1508          Positioning and Restraints    Pre-Treatment Position sitting in chair/recliner  BID  -RF     In Bed supine;call light within reach;encouraged to call for assist  PM  -RF     In Wheelchair sitting;call light within reach;encouraged to call for assist  AM  -RF     Row Name 11/17/22 1508          Daily Progress Summary (PT)    Functional Goal Overall Progress (PT) progressing toward functional goals as expected  -RF     Daily Progress Summary (PT) Fair functional mobility and ambulation quality noted, however, increased assistance required with c/o pain. Pt hindered by severe low back pain and R knee pain. Continued skilled care required for further improvements to ensure maximum safe function upon D/C.  -RF     Impairments Still Limiting Function (PT) functional activity tolerance impairment;pain;range of motion deficit;strength deficit  -RF     Recommendations (PT) Continue per current POC  -RF     Row Name 11/17/22 1508          IRF PT Goals    Bed Mobility Goal Selection (PT-IRF) bed mobility, PT goal 1  -RF     Transfer Goal Selection (PT-IRF) transfers, PT goal 1  -RF     Gait (Walking Locomotion) Goal Selection (PT-IRF) gait, PT goal 1  -RF     Row Name 11/17/22 1508          Bed Mobility Goal 1 (PT-IRF)    Activity/Assistive Device (Bed Mobility Goal 1, PT-IRF) bed mobility activities, all  -RF     [place] Level (Bed Mobility Goal 1, PT-IRF) independent  -RF     Time Frame (Bed Mobility Goal 1, PT-IRF) long-term goal (LTG)  -RF     Progress/Outcomes (Bed Mobility Goal 1, PT-IRF) goal met  -RF     Row Name 11/17/22 1508          Transfer Goal 1 (PT-IRF)    Activity/Assistive Device (Transfer Goal 1, PT-IRF) sit-to-stand/stand-to-sit;bed-to-chair/chair-to-bed;walker, rolling  -RF     [place] Level (Transfer Goal 1, PT-IRF) modified independence  -RF     Time Frame (Transfer Goal 1,  PT-IRF) long-term goal (LTG)  -RF     Progress/Outcomes (Transfer Goal 1, PT-IRF) new goal  -RF     Row Name 11/17/22 1508          Gait/Walking Locomotion Goal 1 (PT-IRF)    Activity/Assistive Device (Gait/Walking Locomotion Goal 1, PT-IRF) gait (walking locomotion);assistive device use  -RF     Gait/Walking Locomotion Distance Goal 1 (PT-IRF) 100'  -RF     [place] Level (Gait/Walking Locomotion Goal 1, PT-IRF) modified independence  -RF     Time Frame (Gait/Walking Locomotion Goal 1, PT-IRF) long-term goal (LTG)  -RF     Progress/Outcomes (Gait/Walking Locomotion Goal 1, PT-IRF) goal revised this date  -RF           User Key  (r) = Recorded By, (t) = Taken By, (c) = Cosigned By    Initials Name Provider Type    RF Meron Lei PTA Physical Therapist Assistant              Wound 09/21/22 1532 Right anterior knee Incision (Active)   Dressing Appearance open to air 11/17/22 0820   Closure Approximated 11/17/22 0820   Base dry;clean 11/17/22 0820   Periwound intact;dry 11/16/22 2037   Periwound Temperature warm 11/16/22 2037   Drainage Amount none 11/17/22 0820   Care, Wound antimicrobial agent applied 11/17/22 0820   Dressing Care open to air 11/17/22 0820       Wound 10/28/22 1827 Right coccyx Pressure Injury (Active)   Dressing Appearance open to air 11/17/22 0820   Closure Open to air 11/17/22 0820   Base non-blanchable;blanchable;pink 11/17/22 0820   Drainage Amount none 11/17/22 0820   Care, Wound barrier applied;pressure removed 11/17/22 0820   Dressing Care open to air 11/17/22 0820     Physical Therapy Education     Title: PT OT SLP Therapies (Done)     Topic: Physical Therapy (Done)     Point: Mobility training (Done)     Learning Progress Summary           Patient Acceptance, E,TB, VU by RF at 11/17/2022 1528    Acceptance, E,TB, VU by DL at 11/17/2022 0233    Acceptance, E,TB, VU by RF at 11/16/2022 1606    Acceptance, E,TB, VU by DL at 11/15/2022 2033    Acceptance, E,TB, VU by RF at 11/15/2022  1541    Acceptance, E,TB, VU by DL at 11/15/2022 0305    Acceptance, E,TB, VU by RF at 11/14/2022 1528    Acceptance, E,TB, VU by RF at 11/10/2022 1551    Acceptance, E,TB, VU by RF at 11/9/2022 1622    Acceptance, E,TB, VU by DG at 11/9/2022 0050    Acceptance, E,TB, VU by RF at 11/8/2022 1539    Acceptance, E,TB, VU by DG at 11/8/2022 0114    Acceptance, E,TB, VU by RF at 11/7/2022 1607    Acceptance, E,TB, VU by DG at 11/6/2022 2023    Acceptance, E,TB, VU by RF at 11/4/2022 1551    Acceptance, E,TB, VU,DU by HR at 11/3/2022 1541    Acceptance, E,D, VU,NR by RG at 11/3/2022 1442    Acceptance, E,TB, VU by DG at 11/1/2022 2016    Acceptance, E,D, VU,NR by RG at 11/1/2022 1541    Acceptance, E,TB, VU by DG at 10/31/2022 2315    Acceptance, E,D, VU,NR by RG at 10/31/2022 1446                   Point: Home exercise program (Done)     Learning Progress Summary           Patient Acceptance, E,TB, VU by RF at 11/17/2022 1528    Acceptance, E,TB, VU by DL at 11/17/2022 0233    Acceptance, E,TB, VU by RF at 11/16/2022 1606    Acceptance, E,TB, VU by DL at 11/15/2022 2033    Acceptance, E,TB, VU by RF at 11/15/2022 1541    Acceptance, E,TB, VU by DL at 11/15/2022 0305    Acceptance, E,TB, VU by RF at 11/14/2022 1528    Acceptance, E,TB, VU by RF at 11/10/2022 1551    Acceptance, E,TB, VU by RF at 11/9/2022 1622    Acceptance, E,TB, VU by DG at 11/9/2022 0050    Acceptance, E,TB, VU by RF at 11/8/2022 1539    Acceptance, E,TB, VU by DG at 11/8/2022 0114    Acceptance, E,TB, VU by RF at 11/7/2022 1607    Acceptance, E,TB, VU by DG at 11/6/2022 2023    Acceptance, E,TB, VU by RF at 11/4/2022 1551    Acceptance, E,TB, VU,DU by HR at 11/3/2022 1541    Acceptance, E,D, VU,NR by RG at 11/3/2022 1442    Acceptance, E,TB, VU by DG at 11/1/2022 2016    Acceptance, E,D, VU,NR by RG at 11/1/2022 1541    Acceptance, E,TB, VU by DG at 10/31/2022 2315    Acceptance, E,D, VU,NR by RG at 10/31/2022 1446                   Point: Body  mechanics (Done)     Learning Progress Summary           Patient Acceptance, E,TB, VU by RF at 11/17/2022 1528    Acceptance, E,TB, VU by DL at 11/17/2022 0233    Acceptance, E,TB, VU by RF at 11/16/2022 1606    Acceptance, E,TB, VU by DL at 11/15/2022 2033    Acceptance, E,TB, VU by RF at 11/15/2022 1541    Acceptance, E,TB, VU by DL at 11/15/2022 0305    Acceptance, E,TB, VU by RF at 11/14/2022 1528    Acceptance, E,TB, VU by RF at 11/10/2022 1551    Acceptance, E,TB, VU by RF at 11/9/2022 1622    Acceptance, E,TB, VU by DG at 11/9/2022 0050    Acceptance, E,TB, VU by RF at 11/8/2022 1539    Acceptance, E,TB, VU by DG at 11/8/2022 0114    Acceptance, E,TB, VU by RF at 11/7/2022 1607    Acceptance, E,TB, VU by DG at 11/6/2022 2023    Acceptance, E,TB, VU by RF at 11/4/2022 1551    Acceptance, E,TB, VU,DU by HR at 11/3/2022 1541    Acceptance, E,D, VU,NR by RG at 11/3/2022 1442    Acceptance, E,TB, VU by DG at 11/1/2022 2016    Acceptance, E,D, VU,NR by RG at 11/1/2022 1541    Acceptance, E,TB, VU by DG at 10/31/2022 2315    Acceptance, E,D, VU,NR by RG at 10/31/2022 1446                   Point: Precautions (Done)     Learning Progress Summary           Patient Acceptance, E,TB, VU by RF at 11/17/2022 1528    Acceptance, E,TB, VU by DL at 11/17/2022 0233    Acceptance, E,TB, VU by RF at 11/16/2022 1606    Acceptance, E,TB, VU by DL at 11/15/2022 2033    Acceptance, E,TB, VU by RF at 11/15/2022 1541    Acceptance, E,TB, VU by DL at 11/15/2022 0305    Acceptance, E,TB, VU by RF at 11/14/2022 1528    Acceptance, E,TB, VU by RF at 11/10/2022 1551    Acceptance, E,TB, VU by RF at 11/9/2022 1622    Acceptance, E,TB, VU by DG at 11/9/2022 0050    Acceptance, E,TB, VU by RF at 11/8/2022 1539    Acceptance, E,TB, VU by DG at 11/8/2022 0114    Acceptance, E,TB, VU by RF at 11/7/2022 1607    Acceptance, E,TB, VU by DG at 11/6/2022 2023    Acceptance, E,TB, VU by RF at 11/4/2022 1551    Acceptance, E,TB, VU,DU by HR at  11/3/2022 1541    Acceptance, E,D, VU,NR by RG at 11/3/2022 1442    Acceptance, E,TB, VU by  at 11/1/2022 2016    Acceptance, E,D, VU,NR by RG at 11/1/2022 1541    Acceptance, E,TB, VU by  at 10/31/2022 2315    Acceptance, E,D, VU,NR by RG at 10/31/2022 1446                               User Key     Initials Effective Dates Name Provider Type Discipline    DG 06/16/21 -  Phyllis Yañez, RN Registered Nurse Nurse     06/16/21 -  Meron Lei PTA Physical Therapist Assistant PT    RG 06/16/21 -  Michi Mckeon PTA Physical Therapist Assistant PT    HR 01/14/22 -  Hanna Murphy, LIZETT Physical Therapist Assistant PT    DL 03/16/22 -  Hellen Mcdonnell RN Registered Nurse Nurse                PT Recommendation and Plan    Frequency of Treatment (PT): 5 times per week, 90 minutes per session     Daily Progress Summary (PT)  Functional Goal Overall Progress (PT): progressing toward functional goals as expected  Daily Progress Summary (PT): Fair functional mobility and ambulation quality noted, however, increased assistance required with c/o pain. Pt hindered by severe low back pain and R knee pain. Continued skilled care required for further improvements to ensure maximum safe function upon D/C.  Impairments Still Limiting Function (PT): functional activity tolerance impairment, pain, range of motion deficit, strength deficit  Recommendations (PT): Continue per current POC               Time Calculation:      PT Charges     Row Name 11/17/22 1530 11/17/22 1529          Time Calculation    Start Time 1345  -RF 1000  -RF     Stop Time 1430  -RF 1045  -RF     Time Calculation (min) 45 min  -RF 45 min  -RF     PT Received On 11/17/22  -RF 11/17/22  -RF     PT - Next Appointment 11/18/22  -RF 11/17/22  -RF     PT Goal Re-Cert Due Date 11/25/22  -RF 11/25/22  -RF        Time Calculation- PT    Total Timed Code Minutes- PT 45 minute(s)  -RF 45 minute(s)  -RF           User Key  (r) = Recorded By, (t) = Taken By, (c) =  Cosigned By    Initials Name Provider Type    RF Meron Lei, LIZETT Physical Therapist Assistant                Therapy Charges for Today     Code Description Service Date Service Provider Modifiers Qty    31801966637 HC GAIT TRAINING EA 15 MIN 11/16/2022 Meron Lei, PTA GP, CQ 1    71648089585 HC PT NEUROMUSC RE EDUCATION EA 15 MIN 11/16/2022 Meron Lei, PTA GP, CQ 1    00707805392 HC PT THER PROC EA 15 MIN 11/16/2022 Meron Lei, PTA GP, CQ 2    34748442828 HC PT THERAPEUTIC ACT EA 15 MIN 11/16/2022 Meron Lei, PTA GP, CQ 2    37560727132 HC GAIT TRAINING EA 15 MIN 11/17/2022 Meron Lei, PTA GP, CQ 2    26898824363 HC PT THER PROC EA 15 MIN 11/17/2022 Meron Lei, PTA GP, CQ 2    20622816848 HC PT THERAPEUTIC ACT EA 15 MIN 11/17/2022 Meron Lei, PTA GP, CQ 2                   Meron Lei, PTA  11/17/2022

## 2022-11-17 NOTE — THERAPY TREATMENT NOTE
Inpatient Rehabilitation - Occupational Therapy Treatment Note    YVONNE Gaines     Patient Name: Melchor Hardwick III  : 1965  MRN: [id number]    Today's Date: 2022                 Admit Date: 10/28/2022         ICD-10-CM ICD-9-CM   1. Staphylococcal arthritis of right knee (HCC)  M00.061 711.06     041.10       Patient Active Problem List   Diagnosis   • Hyperlipidemia   • Hypertensive disorder   • Obesity   • Type 2 diabetes mellitus with hyperglycemia, with long-term current use of insulin (HCC)   • Gas gangrene of foot (HCC)   • Cellulitis in diabetic foot (HCC)   • Other acute osteomyelitis of left foot (HCC)   • Osteomyelitis (HCC)   • Critical illness myopathy   • Staphylococcal arthritis of right knee (HCC)   • Other cirrhosis of liver (HCC)   • MRSA bacteremia   • Endocarditis of tricuspid valve   • Wound of right buttock   • History of osteomyelitis   • Debility       Past Medical History:   Diagnosis Date   • Diabetes mellitus (HCC)    • Hyperlipidemia    • Hypertension    • Pyogenic arthritis of right knee joint, due to unspecified organism (HCC) 2022       Past Surgical History:   Procedure Laterality Date   • ABSCESS DRAINAGE      PERINEUM   • AMPUTATION FOOT Left 2022    Procedure: AMPUTATION FOOT;  Surgeon: Addison Colby MD;  Location: [facility] OR;  Service: Podiatry;  Laterality: Left;   • INCISION AND DRAINAGE LEG Left 05/10/2022    Procedure: INCISION AND DRAINAGE LOWER EXTREMITY;  Surgeon: Addison Colby MD;  Location: [facility] OR;  Service: Podiatry;  Laterality: Left;   • KNEE INCISION AND DRAINAGE Right 2022    Procedure: KNEE INCISION AND DRAINAGE RIGHT;  Surgeon: Brain Bentley MD;  Location: [facility] OR;  Service: Orthopedics;  Laterality: Right;   • WOUND DEBRIDEMENT Left 2022    Procedure: DEBRIDEMENT FOOT;  Surgeon: Addison Colby MD;  Location: [facility] OR;  Service: Podiatry;  Laterality: Left;             IRF OT ASSESSMENT FLOWSHEET (last 12 hours)      IRF OT Evaluation and Treatment     [facility] Name 11/17/22 1211          OT Time and Intention    Document Type daily treatment  -LA     Mode of Treatment individual therapy  -LA     Patient Effort good  -LA     Symptoms Noted During/After Treatment none  -LA     Row Name 11/17/22 1211          General Information    Patient Profile Reviewed yes  -LA     General Observations of Patient Patient agreeable to OT and demonstrates good progress toward goals. Patient remains highly motivated.  -LA     Existing Precautions/Restrictions fall;brace worn when out of bed  abdominal binder  -[facility] Name 11/17/22 1211          Cognition/Psychosocial    Affect/Mental Status (Cognition) WFL  -LA     Orientation Status (Cognition) oriented x 4  -LA     Follows Commands (Cognition) WFL  -[facility] Name 11/17/22 1211          Bathing    [place] Level (Bathing) set up;bathing skills  -LA     Position (Bathing) unsupported sitting;edge of bed sitting  -LA     Set-up Assistance (Bathing) obtain supplies  -[facility] Name 11/17/22 1211          Upper Body Dressing    [place] Level (Upper Body Dressing) set up assistance  -LA     Row Name 11/17/22 1211          Lower Body Dressing    [place] Level (Lower Body Dressing) set up  -[facility] Name 11/17/22 1211          Grooming    [place] Level (Grooming) set up  -[facility] Name 11/17/22 1211          Toileting    [place] Level (Toileting) contact guard assist;minimum assist (75% patient effort)  -[facility] Name 11/17/22 1211          Bed-Chair Transfer    Bed-Chair [place] (Transfers) standby assist;supervision  -LA     Row Name 11/17/22 1211          Toilet Transfer    Type (Toilet Transfer) stand pivot/stand step  -LA     [place] Level (Toilet Transfer) contact guard  -[facility] Name 11/17/22 1211          Motor Skills    Motor Skills coordination;functional endurance  -LA     Motor Control/Coordination Interventions fine motor manipulation/dexterity  activities;gross motor coordination activities;occupation/activity based treatment;therapeutic exercise/ROM  -LA     Therapeutic Exercise shoulder;elbow/forearm;wrist;hand  -LA     Row Name 11/17/22 1211          Positioning and Restraints    Pre-Treatment Position in bed  -LA     Post Treatment Position wheelchair  -LA     In Wheelchair with PT  -LA     Row Name 11/17/22 1211          Daily Progress Summary (OT)    Overall Progress Toward Functional Goals (OT) progressing toward functional goals as expected  -LA           User Key  (r) = Recorded By, (t) = Taken By, (c) = Cosigned By    Initials Name Effective Dates    Jaqueline Mo, OT 02/14/22 -                  Occupational Therapy Education     Title: PT OT SLP Therapies (Done)     Topic: Occupational Therapy (Done)     Point: ADL training (Done)     Description:   Instruct learner(s) on proper safety adaptation and remediation techniques during self care or transfers.   Instruct in proper use of assistive devices.              Learning Progress Summary           Patient Acceptance, E,TB,D, VU,DU,NR by LA at 11/17/2022 1423    Acceptance, E,TB, VU by DL at 11/17/2022 0233    Acceptance, E, VU,NR by HB at 11/16/2022 1425    Acceptance, E,TB, VU by DL at 11/15/2022 2033    Acceptance, E, VU,NR by HB at 11/15/2022 1242    Acceptance, E,TB, VU by DL at 11/15/2022 0305    Acceptance, E,TB,D, VU,DU,NR by LA at 11/11/2022 1248    Acceptance, E, VU,NR by BF at 11/10/2022 1431    Acceptance, E, VU,NR by HB at 11/9/2022 1230    Acceptance, E,TB, VU by DG at 11/9/2022 0050    Acceptance, E, VU,NR by HB at 11/8/2022 1426    Acceptance, E,TB, VU by DG at 11/8/2022 0114    Acceptance, E, VU,NR by HB at 11/7/2022 1155    Acceptance, E,TB, VU by DG at 11/6/2022 2023    Acceptance, E, VU,NR by HB at 11/4/2022 1153    Acceptance, E, VU,NR by HB at 11/3/2022 1428    Acceptance, E,TB, VU by DG at 11/1/2022 2016    Acceptance, BERTIN [place] VU by BRYSON at 10/31/2022 2315    Acceptance, E,  VU,NR by BF at 10/31/2022 1429    Acceptance, E,TB, VU by DL at 10/31/2022 0101    Acceptance, E,TB, VU by DL at 10/29/2022 2321    Acceptance, E, VU,NR by HB at 10/29/2022 1137                   Point: Home exercise program (Done)     Description:   Instruct learner(s) on appropriate technique for monitoring, assisting and/or progressing therapeutic exercises/activities.              Learning Progress Summary           Patient Acceptance, E,TB,D, VU,DU,NR by LA at 11/17/2022 1423    Acceptance, E,TB, VU by DL at 11/17/2022 0233    Acceptance, E, VU,NR by HB at 11/16/2022 1425    Acceptance, E,TB, VU by DL at 11/15/2022 2033    Acceptance, E, VU,NR by HB at 11/15/2022 1242    Acceptance, E,TB, VU by DL at 11/15/2022 0305    Acceptance, E,TB,D, VU,DU,NR by LA at 11/11/2022 1248    Acceptance, E, VU,NR by BF at 11/10/2022 1431    Acceptance, E, VU,NR by HB at 11/9/2022 1230    Acceptance, E,TB, VU by DG at 11/9/2022 0050    Acceptance, E, VU,NR by HB at 11/8/2022 1426    Acceptance, E,TB, VU by DG at 11/8/2022 0114    Acceptance, E, VU,NR by HB at 11/7/2022 1155    Acceptance, E,TB, VU by DG at 11/6/2022 2023    Acceptance, E, VU,NR by HB at 11/4/2022 1153    Acceptance, E, VU,NR by HB at 11/3/2022 1428    Acceptance, E,TB, VU by DG at 11/1/2022 2016    Acceptance, E,TB, VU by DG at 10/31/2022 2315    Acceptance, E,TB, VU by DL at 10/31/2022 0101    Acceptance, E,TB, VU by DL at 10/29/2022 2321    Acceptance, E, VU,NR by HB at 10/29/2022 1137                   Point: Precautions (Done)     Description:   Instruct learner(s) on prescribed precautions during self-care and functional transfers.              Learning Progress Summary           Patient Acceptance, E,TB,D, VU,DU,NR by LA at 11/17/2022 1423    Acceptance, E,TB, VU by DL at 11/17/2022 0233    Acceptance, E, VU,NR by HB at 11/16/2022 1425    Acceptance, E,TB, VU by DL at 11/15/2022 2033    Acceptance, E, VU,NR by HB at 11/15/2022 1242    Acceptance, E,TB, VU  by DL at 11/15/2022 0305    Acceptance, E,TB,D, VU,DU,NR by LA at 11/11/2022 1248    Acceptance, E, VU,NR by HB at 11/9/2022 1230    Acceptance, E,TB, VU by DG at 11/9/2022 0050    Acceptance, E, VU,NR by HB at 11/8/2022 1426    Acceptance, E,TB, VU by DG at 11/8/2022 0114    Acceptance, E, VU,NR by HB at 11/7/2022 1155    Acceptance, E,TB, VU by DG at 11/6/2022 2023    Acceptance, E, VU,NR by HB at 11/4/2022 1153    Acceptance, E, VU,NR by HB at 11/3/2022 1428    Acceptance, E,TB, VU by DG at 11/1/2022 2016    Acceptance, E,TB, VU by DG at 10/31/2022 2315    Acceptance, E, VU,NR by BF at 10/31/2022 1429    Acceptance, E,TB, VU by DL at 10/31/2022 0101    Acceptance, E,TB, VU by DL at 10/29/2022 2321    Acceptance, E, VU,NR by HB at 10/29/2022 1137                   Point: Body mechanics (Done)     Description:   Instruct learner(s) on proper positioning and spine alignment during self-care, functional mobility activities and/or exercises.              Learning Progress Summary           Patient Acceptance, E,TB,D, VU,DU,NR by LA at 11/17/2022 1423    Acceptance, E,TB, VU by DL at 11/17/2022 0233    Acceptance, E, VU,NR by HB at 11/16/2022 1425    Acceptance, E,TB, VU by DL at 11/15/2022 2033    Acceptance, E, VU,NR by HB at 11/15/2022 1242    Acceptance, E,TB, VU by DL at 11/15/2022 0305    Acceptance, E,TB,D, VU,DU,NR by LA at 11/11/2022 1248    Acceptance, E, VU,NR by HB at 11/9/2022 1230    Acceptance, E,TB, VU by DG at 11/9/2022 0050    Acceptance, E, VU,NR by HB at 11/8/2022 1426    Acceptance, E,TB, VU by DG at 11/8/2022 0114    Acceptance, E, VU,NR by HB at 11/7/2022 1155    Acceptance, E,TB, VU by DG at 11/6/2022 2023    Acceptance, E, VU,NR by HB at 11/4/2022 1153    Acceptance, E, VU,NR by HB at 11/3/2022 1428    Acceptance, E,TB, VU by DG at 11/1/2022 2016    Acceptance, E,TB, VU by DG at 10/31/2022 2315    Acceptance, E,TB, VU by DL at 10/31/2022 0101    Acceptance, E,TB, VU by DL at 10/29/2022 2321     Acceptance, E, VU,NR by  at 10/29/2022 1137                               User Key     Initials Effective Dates Name Provider Type Discipline    DG 06/16/21 -  Phyllis Yañez, RN Registered Nurse Nurse     06/16/21 -  Mandi Smith OT Occupational Therapist OT    HB 05/25/21 -  Dorothy Rosas OT Occupational Therapist OT    LA 02/14/22 -  Jaqueline Bautista OT Occupational Therapist OT     03/16/22 -  Hellen Mcdonnell, RN Registered Nurse Nurse                    OT Recommendation and Plan            Daily Progress Summary (OT)  Overall Progress Toward Functional Goals (OT): progressing toward functional goals as expected            Time Calculation:      Time Calculation- OT     Row Name 11/17/22 1423             Time Calculation- OT    OT Start Time 0830  -LA      OT Stop Time 1000  -LA      OT Time Calculation (min) 90 min  -LA      Total Timed Code Minutes- OT 90 minute(s)  -LA            User Key  (r) = Recorded By, (t) = Taken By, (c) = Cosigned By    Initials Name Provider Type    LA Jaqueline Bautista OT Occupational Therapist              Therapy Charges for Today     Code Description Service Date Service Provider Modifiers Qty    16635967513 HC OT SELF CARE/MGMT/TRAIN EA 15 MIN 11/17/2022 Jaqueline Bautista OT GO 2    88689835441 HC OT THER PROC EA 15 MIN 11/17/2022 Jaqueline Bautista OT GO 2    99057753798 HC OT THERAPEUTIC ACT EA 15 MIN 11/17/2022 Jaqueline Bautista OT GO 2                   Jaqueline Bautista OT  11/17/2022

## 2022-11-18 LAB
ANION GAP SERPL CALCULATED.3IONS-SCNC: 8.5 MMOL/L (ref 5–15)
APTT PPP: 38.6 SECONDS (ref 26.5–34.5)
BASOPHILS # BLD AUTO: 0.01 10*3/MM3 (ref 0–0.2)
BASOPHILS NFR BLD AUTO: 0.4 % (ref 0–1.5)
BUN SERPL-MCNC: 11 MG/DL (ref 6–20)
BUN/CREAT SERPL: 15.5 (ref 7–25)
CALCIUM SPEC-SCNC: 8.3 MG/DL (ref 8.6–10.5)
CHLORIDE SERPL-SCNC: 99 MMOL/L (ref 98–107)
CO2 SERPL-SCNC: 24.5 MMOL/L (ref 22–29)
CREAT SERPL-MCNC: 0.71 MG/DL (ref 0.76–1.27)
CRP SERPL-MCNC: 8.38 MG/DL (ref 0–0.5)
DEPRECATED RDW RBC AUTO: 51.9 FL (ref 37–54)
EGFRCR SERPLBLD CKD-EPI 2021: 107 ML/MIN/1.73
EOSINOPHIL # BLD AUTO: 0.05 10*3/MM3 (ref 0–0.4)
EOSINOPHIL NFR BLD AUTO: 1.8 % (ref 0.3–6.2)
ERYTHROCYTE [DISTWIDTH] IN BLOOD BY AUTOMATED COUNT: 17.1 % (ref 12.3–15.4)
GLUCOSE BLDC GLUCOMTR-MCNC: 139 MG/DL (ref 70–130)
GLUCOSE BLDC GLUCOMTR-MCNC: 224 MG/DL (ref 70–130)
GLUCOSE BLDC GLUCOMTR-MCNC: 251 MG/DL (ref 70–130)
GLUCOSE SERPL-MCNC: 199 MG/DL (ref 65–99)
HCT VFR BLD AUTO: 29.4 % (ref 37.5–51)
HGB BLD-MCNC: 9.1 G/DL (ref 13–17.7)
IMM GRANULOCYTES # BLD AUTO: 0.01 10*3/MM3 (ref 0–0.05)
IMM GRANULOCYTES NFR BLD AUTO: 0.4 % (ref 0–0.5)
INR PPP: 1.18 (ref 0.9–1.1)
LYMPHOCYTES # BLD AUTO: 0.7 10*3/MM3 (ref 0.7–3.1)
LYMPHOCYTES NFR BLD AUTO: 24.6 % (ref 19.6–45.3)
MCH RBC QN AUTO: 25.8 PG (ref 26.6–33)
MCHC RBC AUTO-ENTMCNC: 31 G/DL (ref 31.5–35.7)
MCV RBC AUTO: 83.3 FL (ref 79–97)
MONOCYTES # BLD AUTO: 0.31 10*3/MM3 (ref 0.1–0.9)
MONOCYTES NFR BLD AUTO: 10.9 % (ref 5–12)
NEUTROPHILS NFR BLD AUTO: 1.76 10*3/MM3 (ref 1.7–7)
NEUTROPHILS NFR BLD AUTO: 61.9 % (ref 42.7–76)
NRBC BLD AUTO-RTO: 0 /100 WBC (ref 0–0.2)
PLATELET # BLD AUTO: 104 10*3/MM3 (ref 140–450)
PMV BLD AUTO: 9.9 FL (ref 6–12)
POTASSIUM SERPL-SCNC: 4 MMOL/L (ref 3.5–5.2)
PROTHROMBIN TIME: 15.3 SECONDS (ref 12.1–14.7)
RBC # BLD AUTO: 3.53 10*6/MM3 (ref 4.14–5.8)
SODIUM SERPL-SCNC: 132 MMOL/L (ref 136–145)
WBC NRBC COR # BLD: 2.84 10*3/MM3 (ref 3.4–10.8)

## 2022-11-18 PROCEDURE — 99231 SBSQ HOSP IP/OBS SF/LOW 25: CPT | Performed by: FAMILY MEDICINE

## 2022-11-18 PROCEDURE — 85025 COMPLETE CBC W/AUTO DIFF WBC: CPT | Performed by: FAMILY MEDICINE

## 2022-11-18 PROCEDURE — 25010000002 FONDAPARINUX PER 0.5 MG: Performed by: FAMILY MEDICINE

## 2022-11-18 PROCEDURE — 97530 THERAPEUTIC ACTIVITIES: CPT

## 2022-11-18 PROCEDURE — 80048 BASIC METABOLIC PNL TOTAL CA: CPT | Performed by: FAMILY MEDICINE

## 2022-11-18 PROCEDURE — 82962 GLUCOSE BLOOD TEST: CPT

## 2022-11-18 PROCEDURE — 97535 SELF CARE MNGMENT TRAINING: CPT

## 2022-11-18 PROCEDURE — 97110 THERAPEUTIC EXERCISES: CPT

## 2022-11-18 PROCEDURE — 63710000001 INSULIN DETEMIR PER 5 UNITS: Performed by: INTERNAL MEDICINE

## 2022-11-18 PROCEDURE — 97116 GAIT TRAINING THERAPY: CPT

## 2022-11-18 PROCEDURE — 86140 C-REACTIVE PROTEIN: CPT | Performed by: FAMILY MEDICINE

## 2022-11-18 PROCEDURE — 85730 THROMBOPLASTIN TIME PARTIAL: CPT | Performed by: FAMILY MEDICINE

## 2022-11-18 PROCEDURE — 99233 SBSQ HOSP IP/OBS HIGH 50: CPT | Performed by: INTERNAL MEDICINE

## 2022-11-18 PROCEDURE — 85610 PROTHROMBIN TIME: CPT | Performed by: FAMILY MEDICINE

## 2022-11-18 PROCEDURE — 63710000001 INSULIN ASPART PER 5 UNITS: Performed by: INTERNAL MEDICINE

## 2022-11-18 PROCEDURE — 25010000002 DAPTOMYCIN PER 1 MG: Performed by: INTERNAL MEDICINE

## 2022-11-18 RX ORDER — LIDOCAINE HYDROCHLORIDE 10 MG/ML
5 INJECTION, SOLUTION EPIDURAL; INFILTRATION; INTRACAUDAL; PERINEURAL ONCE
Status: COMPLETED | OUTPATIENT
Start: 2022-11-18 | End: 2022-11-18

## 2022-11-18 RX ORDER — FONDAPARINUX SODIUM 2.5 MG/.5ML
2.5 INJECTION SUBCUTANEOUS
Status: DISCONTINUED | OUTPATIENT
Start: 2022-11-18 | End: 2022-11-28

## 2022-11-18 RX ADMIN — DICLOFENAC 4 G: 10 GEL TOPICAL at 11:40

## 2022-11-18 RX ADMIN — FLUDROCORTISONE ACETATE 50 MCG: 0.1 TABLET ORAL at 08:05

## 2022-11-18 RX ADMIN — PREGABALIN 100 MG: 100 CAPSULE ORAL at 08:17

## 2022-11-18 RX ADMIN — Medication 150 MG: at 08:05

## 2022-11-18 RX ADMIN — Medication 1 TABLET: at 08:05

## 2022-11-18 RX ADMIN — DOCUSATE SODIUM 100 MG: 100 CAPSULE ORAL at 21:37

## 2022-11-18 RX ADMIN — PREGABALIN 100 MG: 100 CAPSULE ORAL at 21:37

## 2022-11-18 RX ADMIN — CARBIDOPA AND LEVODOPA 10 MG: 50; 200 TABLET, EXTENDED RELEASE ORAL at 16:51

## 2022-11-18 RX ADMIN — ASPIRIN 81 MG: 81 TABLET, COATED ORAL at 08:05

## 2022-11-18 RX ADMIN — CYCLOBENZAPRINE 5 MG: 10 TABLET, FILM COATED ORAL at 21:37

## 2022-11-18 RX ADMIN — HYDROCODONE BITARTRATE AND ACETAMINOPHEN 1 TABLET: 5; 325 TABLET ORAL at 08:17

## 2022-11-18 RX ADMIN — DAPTOMYCIN 500 MG: 500 INJECTION, POWDER, LYOPHILIZED, FOR SOLUTION INTRAVENOUS at 18:42

## 2022-11-18 RX ADMIN — POLYETHYLENE GLYCOL (3350) 17 G: 17 POWDER, FOR SOLUTION ORAL at 08:05

## 2022-11-18 RX ADMIN — PANTOPRAZOLE SODIUM 40 MG: 40 TABLET, DELAYED RELEASE ORAL at 05:37

## 2022-11-18 RX ADMIN — INSULIN DETEMIR 18 UNITS: 100 INJECTION, SOLUTION SUBCUTANEOUS at 09:43

## 2022-11-18 RX ADMIN — DICLOFENAC 4 G: 10 GEL TOPICAL at 21:38

## 2022-11-18 RX ADMIN — DICLOFENAC 4 G: 10 GEL TOPICAL at 08:06

## 2022-11-18 RX ADMIN — INSULIN ASPART 8 UNITS: 100 INJECTION, SOLUTION INTRAVENOUS; SUBCUTANEOUS at 16:51

## 2022-11-18 RX ADMIN — DOCUSATE SODIUM 100 MG: 100 CAPSULE ORAL at 08:05

## 2022-11-18 RX ADMIN — INSULIN ASPART 5 UNITS: 100 INJECTION, SOLUTION INTRAVENOUS; SUBCUTANEOUS at 11:40

## 2022-11-18 RX ADMIN — OXYCODONE HYDROCHLORIDE AND ACETAMINOPHEN 500 MG: 500 TABLET ORAL at 08:05

## 2022-11-18 RX ADMIN — LEVOTHYROXINE SODIUM 25 MCG: 0.07 TABLET ORAL at 05:37

## 2022-11-18 RX ADMIN — HYDROCODONE BITARTRATE AND ACETAMINOPHEN 1 TABLET: 5; 325 TABLET ORAL at 21:37

## 2022-11-18 RX ADMIN — DICLOFENAC 4 G: 10 GEL TOPICAL at 18:04

## 2022-11-18 RX ADMIN — CYCLOBENZAPRINE 5 MG: 10 TABLET, FILM COATED ORAL at 08:05

## 2022-11-18 RX ADMIN — CARBIDOPA AND LEVODOPA 10 MG: 50; 200 TABLET, EXTENDED RELEASE ORAL at 11:40

## 2022-11-18 RX ADMIN — LIDOCAINE HYDROCHLORIDE 5 ML: 10 INJECTION, SOLUTION EPIDURAL; INFILTRATION; INTRACAUDAL; PERINEURAL at 12:30

## 2022-11-18 RX ADMIN — FONDAPARINUX SODIUM 2.5 MG: 2.5 INJECTION, SOLUTION SUBCUTANEOUS at 16:56

## 2022-11-18 RX ADMIN — INSULIN DETEMIR 18 UNITS: 100 INJECTION, SOLUTION SUBCUTANEOUS at 21:37

## 2022-11-18 RX ADMIN — CARBIDOPA AND LEVODOPA 10 MG: 50; 200 TABLET, EXTENDED RELEASE ORAL at 06:33

## 2022-11-18 NOTE — THERAPY TREATMENT NOTE
Inpatient Rehabilitation - Occupational Therapy Treatment Note    YVONNE Gaines     Patient Name: Melchor Hardwick III  : 1965  MRN: [id number]    Today's Date: 2022                 Admit Date: 10/28/2022         ICD-10-CM ICD-9-CM   1. Staphylococcal arthritis of right knee (HCC)  M00.061 711.06     041.10       Patient Active Problem List   Diagnosis   • Hyperlipidemia   • Hypertensive disorder   • Obesity   • Type 2 diabetes mellitus with hyperglycemia, with long-term current use of insulin (HCC)   • Gas gangrene of foot (HCC)   • Cellulitis in diabetic foot (HCC)   • Other acute osteomyelitis of left foot (HCC)   • Osteomyelitis (HCC)   • Critical illness myopathy   • Staphylococcal arthritis of right knee (HCC)   • Other cirrhosis of liver (HCC)   • MRSA bacteremia   • Endocarditis of tricuspid valve   • Wound of right buttock   • History of osteomyelitis   • Debility       Past Medical History:   Diagnosis Date   • Diabetes mellitus (HCC)    • Hyperlipidemia    • Hypertension    • Pyogenic arthritis of right knee joint, due to unspecified organism (HCC) 2022       Past Surgical History:   Procedure Laterality Date   • ABSCESS DRAINAGE      PERINEUM   • AMPUTATION FOOT Left 2022    Procedure: AMPUTATION FOOT;  Surgeon: Addison Colby MD;  Location: [facility] OR;  Service: Podiatry;  Laterality: Left;   • INCISION AND DRAINAGE LEG Left 05/10/2022    Procedure: INCISION AND DRAINAGE LOWER EXTREMITY;  Surgeon: Addison Colby MD;  Location: [facility] OR;  Service: Podiatry;  Laterality: Left;   • KNEE INCISION AND DRAINAGE Right 2022    Procedure: KNEE INCISION AND DRAINAGE RIGHT;  Surgeon: Brain Bentley MD;  Location: [facility] OR;  Service: Orthopedics;  Laterality: Right;   • WOUND DEBRIDEMENT Left 2022    Procedure: DEBRIDEMENT FOOT;  Surgeon: Addison Colby MD;  Location: [facility] OR;  Service: Podiatry;  Laterality: Left;             IRF OT ASSESSMENT FLOWSHEET (last 12 hours)      IRF OT Evaluation and Treatment     [facility] Name 11/18/22 1203          OT Time and Intention    Document Type daily treatment  -HB     Mode of Treatment individual therapy;occupational therapy  -HB     Total Minutes, Occupational Therapy 90  -HB     Patient Effort good  -HB     Symptoms Noted During/After Treatment none  -HB     Row Name 11/18/22 1203          General Information    Patient Profile Reviewed yes  -HB     Patient/Family/Caregiver Comments/Observations Patient and RN [email or web address] OT this date,.  -HB     General Observations of Patient Patient tolerated OT well with no adverse reactions.  -HB     Existing Precautions/Restrictions fall;brace worn when out of bed  -HB     Row Name 11/18/22 1203          Pain Assessment    Pretreatment Pain Rating 3/10  -HB     Posttreatment Pain Rating 5/10  -HB     Pain Location - back  -     Row Name 11/18/22 1203          Cognition/Psychosocial    Affect/Mental Status (Cognition) WFL  -     Orientation Status (Cognition) oriented x 4  -HB     Follows Commands (Cognition) WFL  -     Row Name 11/18/22 1203          Upper Body Dressing    [place] Level (Upper Body Dressing) upper body dressing skills;pull over garment;set up assistance  -HB     Position (Upper Body Dressing) supine  -     Row Name 11/18/22 1203          Grooming    [place] Level (Grooming) grooming skills;set up  -HB     [facility] Name 11/18/22 1203          Bed Mobility    Supine-Sit [place] (Bed Mobility) nonverbal cues (demo/gesture);verbal cues;standby assist  -[facility] Name 11/18/22 1203          Bed-Chair Transfer    Bed-Chair [place] (Transfers) minimum assist (75% patient effort);nonverbal cues (demo/gesture);verbal cues  -     Assistive Device (Bed-Chair Transfers) walker, front-wheeled  -     Row Name 11/18/22 1203          Sit-Stand Transfer    Sit-Stand [place] (Transfers) contact guard;nonverbal cues (demo/gesture);verbal cues  -     Assistive Device (Sit-Stand  Transfers) walker, front-wheeled  -     Row Name 11/18/22 1203          Stand-Sit Transfer    Stand-Sit [place] (Transfers) contact guard;standby assist  -     Assistive Device (Stand-Sit Transfers) wheelchair;walker, front-wheeled  -     Row Name 11/18/22 1203          Motor Skills    Motor Skills functional endurance;coordination;motor control/coordination interventions  -     Motor Control/Coordination Interventions fine motor manipulation/dexterity activities;therapeutic exercise/ROM;gross motor coordination activities  -     Therapeutic Exercise shoulder;hand;wrist;elbow/forearm  -     Additional Documentation --  PRE 15 min; BUE TA to increase ADL status; wrist rolls; rest between sets  -     Row Name 11/18/22 1203          Positioning and Restraints    Pre-Treatment Position in bed  -     Post Treatment Position wheelchair  -     In Bed encouraged to call for assist;call light within reach  -           User Key  (r) = Recorded By, (t) = Taken By, (c) = Cosigned By    Initials Name Effective Dates     Dorothy Rosas, PAYAM 05/25/21 -                  Occupational Therapy Education     Title: PT OT SLP Therapies (Done)     Topic: Occupational Therapy (Done)     Point: ADL training (Done)     Description:   Instruct learner(s) on proper safety adaptation and remediation techniques during self care or transfers.   Instruct in proper use of assistive devices.              Learning Progress Summary           Patient Acceptance, E, VU,NR by HB at 11/18/2022 1211    Acceptance, E,TB,D, VU,DU,NR by LA at 11/17/2022 1423    Acceptance, E,TB, VU by DL at 11/17/2022 0233    Acceptance, E, VU,NR by HB at 11/16/2022 1425    Acceptance, E,TB, VU by DL at 11/15/2022 2033    Acceptance, E, VU,NR by HB at 11/15/2022 1242    Acceptance, E,TB, VU by DL at 11/15/2022 0305    Acceptance, E,TB,D, VU,DU,NR by LA at 11/11/2022 1248    Acceptance, E, VU,NR by BF at 11/10/2022 1431    Acceptance, E, VU,NR by   at 11/9/2022 1230    Acceptance, E,TB, VU by DG at 11/9/2022 0050    Acceptance, E, VU,NR by HB at 11/8/2022 1426    Acceptance, E,TB, VU by DG at 11/8/2022 0114    Acceptance, E, VU,NR by HB at 11/7/2022 1155    Acceptance, E,TB, VU by DG at 11/6/2022 2023    Acceptance, E, VU,NR by HB at 11/4/2022 1153    Acceptance, E, VU,NR by HB at 11/3/2022 1428    Acceptance, E,TB, VU by DG at 11/1/2022 2016    Acceptance, E,TB, VU by DG at 10/31/2022 2315    Acceptance, E, VU,NR by BF at 10/31/2022 1429    Acceptance, E,TB, VU by DL at 10/31/2022 0101    Acceptance, E,TB, VU by DL at 10/29/2022 2321    Acceptance, E, VU,NR by HB at 10/29/2022 1137                   Point: Home exercise program (Done)     Description:   Instruct learner(s) on appropriate technique for monitoring, assisting and/or progressing therapeutic exercises/activities.              Learning Progress Summary           Patient Acceptance, E, VU,NR by HB at 11/18/2022 1211    Acceptance, E,TB,D, VU,DU,NR by LA at 11/17/2022 1423    Acceptance, E,TB, VU by DL at 11/17/2022 0233    Acceptance, E, VU,NR by HB at 11/16/2022 1425    Acceptance, E,TB, VU by DL at 11/15/2022 2033    Acceptance, E, VU,NR by HB at 11/15/2022 1242    Acceptance, E,TB, VU by DL at 11/15/2022 0305    Acceptance, E,TB,D, VU,DU,NR by LA at 11/11/2022 1248    Acceptance, E, VU,NR by BF at 11/10/2022 1431    Acceptance, E, VU,NR by HB at 11/9/2022 1230    Acceptance, E,TB, VU by DG at 11/9/2022 0050    Acceptance, E, VU,NR by HB at 11/8/2022 1426    Acceptance, E,TB, VU by DG at 11/8/2022 0114    Acceptance, E, VU,NR by HB at 11/7/2022 1155    Acceptance, E,TB, VU by DG at 11/6/2022 2023    Acceptance, E, VU,NR by HB at 11/4/2022 1153    Acceptance, E, VU,NR by HB at 11/3/2022 1428    Acceptance, E,TB, VU by DG at 11/1/2022 2016    Acceptance, E,TB, VU by DG at 10/31/2022 2315    Acceptance, E,TB, VU by DL at 10/31/2022 0101    Acceptance, E,TB, VU by DL at 10/29/2022 2321    Acceptance,  E, VU,NR by HB at 10/29/2022 1137                   Point: Precautions (Done)     Description:   Instruct learner(s) on prescribed precautions during self-care and functional transfers.              Learning Progress Summary           Patient Acceptance, E, VU,NR by HB at 11/18/2022 1211    Acceptance, E,TB,D, VU,DU,NR by LA at 11/17/2022 1423    Acceptance, E,TB, VU by DL at 11/17/2022 0233    Acceptance, E, VU,NR by HB at 11/16/2022 1425    Acceptance, E,TB, VU by DL at 11/15/2022 2033    Acceptance, E, VU,NR by HB at 11/15/2022 1242    Acceptance, E,TB, VU by DL at 11/15/2022 0305    Acceptance, E,TB,D, VU,DU,NR by LA at 11/11/2022 1248    Acceptance, E, VU,NR by HB at 11/9/2022 1230    Acceptance, E,TB, VU by DG at 11/9/2022 0050    Acceptance, E, VU,NR by HB at 11/8/2022 1426    Acceptance, E,TB, VU by DG at 11/8/2022 0114    Acceptance, E, VU,NR by HB at 11/7/2022 1155    Acceptance, E,TB, VU by DG at 11/6/2022 2023    Acceptance, E, VU,NR by HB at 11/4/2022 1153    Acceptance, E, VU,NR by HB at 11/3/2022 1428    Acceptance, E,TB, VU by DG at 11/1/2022 2016    Acceptance, E,TB, VU by DG at 10/31/2022 2315    Acceptance, E, VU,NR by BF at 10/31/2022 1429    Acceptance, E,TB, VU by DL at 10/31/2022 0101    Acceptance, E,TB, VU by DL at 10/29/2022 2321    Acceptance, E, VU,NR by HB at 10/29/2022 1137                   Point: Body mechanics (Done)     Description:   Instruct learner(s) on proper positioning and spine alignment during self-care, functional mobility activities and/or exercises.              Learning Progress Summary           Patient Acceptance, E, VU,NR by HB at 11/18/2022 1211    Acceptance, E,TB,D, VU,DU,NR by LA at 11/17/2022 1423    Acceptance, E,TB, VU by DL at 11/17/2022 0233    Acceptance, E, VU,NR by HB at 11/16/2022 1425    Acceptance, E,TB, VU by DL at 11/15/2022 2033    Acceptance, E, VU,NR by HB at 11/15/2022 1242    Acceptance, E,TB, VU by DL at 11/15/2022 0305    Acceptance, E,TB,D,  VU,DU,NR by LA at 11/11/2022 1248    Acceptance, E, VU,NR by HB at 11/9/2022 1230    Acceptance, E,TB, VU by DG at 11/9/2022 0050    Acceptance, E, VU,NR by HB at 11/8/2022 1426    Acceptance, E,TB, VU by DG at 11/8/2022 0114    Acceptance, E, VU,NR by HB at 11/7/2022 1155    Acceptance, E,TB, VU by DG at 11/6/2022 2023    Acceptance, E, VU,NR by HB at 11/4/2022 1153    Acceptance, E, VU,NR by HB at 11/3/2022 1428    Acceptance, E,TB, VU by DG at 11/1/2022 2016    Acceptance, E,TB, VU by DG at 10/31/2022 2315    Acceptance, E,TB, VU by DL at 10/31/2022 0101    Acceptance, E,TB, VU by DL at 10/29/2022 2321    Acceptance, E, VU,NR by HB at 10/29/2022 1137                               User Key     Initials Effective Dates Name Provider Type Discipline    DG 06/16/21 -  Phyllis Yañez, RN Registered Nurse Nurse     06/16/21 -  Mandi Smith OT Occupational Therapist OT    HB 05/25/21 -  Dorothy Rosas OT Occupational Therapist OT    LA 02/14/22 -  Jaqueline Bautista OT Occupational Therapist OT    DL 03/16/22 -  Hellen Mcdonnell, RN Registered Nurse Nurse                    OT Recommendation and Plan    Planned Therapy Interventions (OT): activity tolerance training, adaptive equipment training, BADL retraining, IADL retraining, patient/caregiver education/training, strengthening exercise, transfer/mobility retraining                    Time Calculation:      Time Calculation- OT     Row Name 11/18/22 1211             Time Calculation- OT    OT Start Time 0745  -HB      OT Stop Time 0915  -HB      OT Time Calculation (min) 90 min  -HB      Total Timed Code Minutes- OT 90 minute(s)  -HB      OT Non-Billable Time (min) 10 min  -HB            User Key  (r) = Recorded By, (t) = Taken By, (c) = Cosigned By    Initials Name Provider Type     Dorothy Rosas, OT Occupational Therapist              Therapy Charges for Today     Code Description Service Date Service Provider Modifiers Qty    48696802758  OT SELF  CARE/MGMT/TRAIN EA 15 MIN 11/18/2022 Dorothy Rosas, OT GO 2    91632966706 HC OT THER PROC EA 15 MIN 11/18/2022 Dorothy Rosas OT GO 2    07911773284 HC OT THERAPEUTIC ACT EA 15 MIN 11/18/2022 Dorothy Rosas OT GO 2                   Dorothy Rosas OT  11/18/2022

## 2022-11-18 NOTE — SIGNIFICANT NOTE
11/18/22 1233   Plan   Plan Pt's sister Ros came today for education with PT/OT.  SS and PTA spoke to sister who voiced she is unable to care for pt at her home at this time.  Sister has talked to pt about going to SNF and privately paying for stay due to his insurance not covering skilled nursing care.  Sister aware of seperate charges for room and board, medications and therapy services at a SNF and she says pt is aware of this too.  Sister has informed pt and he recognizes that going to SNF is his option.  Pt has right knee effusion and needs aspiration today.  SS can assist with SNF when pt is medically stable.  Will follow.   Patient/Family in Agreement with Plan yes

## 2022-11-18 NOTE — CONSULTS
"  Referring Provider: ROSALINA Christiansen  Reason for Consultation: possible septic knee     Patient Care Team:  Missy Up APRN as PCP - General (Nurse Practitioner)  Addison Colby MD as Consulting Physician (Podiatry)    Chief complaint  Knee pain     Subjective   In room currently, in wheelchair.   History of present illness: History of Present Illness  56 y/o male currently in rehab unit.  Past history of septic right knee, MRSA bacteremia.  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 The patient also had Right Knee I&D by Dr. Lomax at [facility] in [place] the last week in September for septic arthritis.  He states that he recalls twisting the right knee prior to onset of infection.    He was eventually transferred to [facility] for longterm IV antibiotics to include Daptomycin.  He was followed by ID there when an area of \"redness\" was noted to the lateral aspect of knee, prompting concern for recurrent infection.    The patient is now in the Rehab unit, continuing the antibiotics but also more therapy for the right knee. He states no visible change in appearance to the knee, no change in level of ROM or level of pain.    Orthopedics has been consulted to evaluate for possible recurrent septic joint.  Dr. Morales is aware of the consult and has requested that the knee be aspirated.  The patient remains on Daptomycin for endocarditis.  Review of Systems  Review of Systems   Constitutional: Positive for activity change. Negative for chills and fever.   HENT: Negative.    Respiratory: Negative.    Cardiovascular: Negative.    Gastrointestinal: Negative.    Endocrine: Negative.    Genitourinary: Negative.    Musculoskeletal: Positive for arthralgias, back pain, gait problem and joint swelling.   Skin: Negative.    Allergic/Immunologic: Negative.    Neurological: Positive for weakness.   Hematological: Negative.  "   Psychiatric/Behavioral: Negative.         History  Past Medical History:   Diagnosis Date    Diabetes mellitus (HCC)     Hyperlipidemia     Hypertension     Pyogenic arthritis of right knee joint, due to unspecified organism (HCC) 09/21/2022   ,   Past Surgical History:   Procedure Laterality Date    ABSCESS DRAINAGE      PERINEUM    AMPUTATION FOOT Left 5/18/2022    Procedure: AMPUTATION FOOT;  Surgeon: Addison Colby MD;  Location: [facility] OR;  Service: Podiatry;  Laterality: Left;    INCISION AND DRAINAGE LEG Left 05/10/2022    Procedure: INCISION AND DRAINAGE LOWER EXTREMITY;  Surgeon: Addison Colby MD;  Location:  [facility] OR;  Service: Podiatry;  Laterality: Left;    KNEE INCISION AND DRAINAGE Right 9/21/2022    Procedure: KNEE INCISION AND DRAINAGE RIGHT;  Surgeon: Brain Bentley MD;  Location:  [facility] OR;  Service: Orthopedics;  Laterality: Right;    WOUND DEBRIDEMENT Left 05/13/2022    Procedure: DEBRIDEMENT FOOT;  Surgeon: Addison Colby MD;  Location: [facility] OR;  Service: Podiatry;  Laterality: Left;   , History reviewed. No pertinent family history.,   Social History     Tobacco Use    Smoking status: Never    Smokeless tobacco: Never   Substance Use Topics    Alcohol use: Never    Drug use: Never   ,   Medications Prior to Admission   Medication Sig Dispense Refill Last Dose    aspirin 81 MG EC tablet Take 1 tablet by mouth Daily.   Unknown    atorvastatin (LIPITOR) 40 MG tablet Take 1 tablet by mouth Daily.   Unknown    Diclofenac Sodium (VOLTAREN) 1 % gel gel Apply 2 g topically to the appropriate area as directed 4 (Four) Times a Day As Needed.   Unknown    ferrous sulfate 325 (65 FE) MG tablet Take 1 tablet by mouth 2 (Two) Times a Day.   Unknown    Insulin Aspart (novoLOG) 100 UNIT/ML injection Inject 10 Units under the skin into the appropriate area as directed 3 (Three) Times a Day Before Meals.   Unknown    insulin detemir (LEVEMIR) 100 UNIT/ML injection Inject 10 Units under the skin into  the appropriate area as directed 2 (Two) Times a Day.   Unknown    metFORMIN (GLUCOPHAGE) 500 MG tablet Take 1 tablet by mouth 2 (Two) Times a Day With Meals.   Unknown    multivitamin with minerals tablet tablet Take 1 tablet by mouth Daily.   Unknown    nabumetone (RELAFEN) 750 MG tablet Take 1 tablet by mouth 2 (Two) Times a Day.   Unknown    pregabalin (LYRICA) 150 MG capsule Take 1 capsule by mouth every night at bedtime.   Unknown    Psyllium (Metamucil) wafer wafer Take 2 wafers by mouth Daily.   Unknown    spironolactone (ALDACTONE) 50 MG tablet Take 1 tablet by mouth Daily.   Unknown    tiZANidine (ZANAFLEX) 4 MG tablet Take 1 tablet by mouth Every 12 (Twelve) Hours As Needed for Muscle Spasms.   Unknown   , Scheduled Meds:  ascorbic acid, 500 mg, Oral, Daily  aspirin, 81 mg, Oral, Daily  DAPTOmycin, 6 mg/kg (Adjusted), Intravenous, Q24H  Diclofenac Sodium, 4 g, Topical, 4x Daily  docusate sodium, 100 mg, Oral, BID  fludrocortisone, 50 mcg, Oral, Daily  Insulin Aspart, 0-14 Units, Subcutaneous, TID AC  insulin detemir, 18 Units, Subcutaneous, Q12H  iron polysaccharides, 150 mg, Oral, Daily  levothyroxine, 25 mcg, Oral, Q AM  lidocaine PF 1%, 5 mL, Injection, Once  lidocaine PF 2%, 10 mL, Injection, Once  midodrine, 10 mg, Oral, TID AC  multivitamin with minerals, 1 tablet, Oral, Daily  pantoprazole, 40 mg, Oral, Q AM  polyethylene glycol, 17 g, Oral, Daily  pregabalin, 100 mg, Oral, Q12H    , Continuous Infusions:  Pharmacy Consult,     , PRN Meds:    acetaminophen    bisacodyl    cyclobenzaprine    dextrose    dextrose    glucagon (human recombinant)    HYDROcodone-acetaminophen    magic barrier cream    Pharmacy Consult and Allergies:  Patient has no known allergies.    Objective   Surgical incision to right knee noted, appears healed, large effusion, with warm skin temperature.  No significant erythema noted.   Vital Signs   Temp:  [98.8 °F (37.1 °C)] 98.8 °F (37.1 °C)  Heart Rate:  [81-87] 86  Resp:   [18] 18  BP: (118-154)/(57-72) 154/72    Physical Exam:   Physical Exam  Constitutional:       General: He is not in acute distress.     Appearance: Normal appearance.   HENT:      Head: Normocephalic.   Cardiovascular:      Rate and Rhythm: Normal rate.   Pulmonary:      Effort: Pulmonary effort is normal. No respiratory distress.   Musculoskeletal:         General: Swelling and tenderness present.      Comments: Restricted ROM.  Flexion approx. 60. Unable to fully extend.   Incision well healed  Increase skin temp  Large effusion    Skin:     General: Skin is warm and dry.   Neurological:      General: No focal deficit present.      Mental Status: He is alert.   Psychiatric:         Mood and Affect: Mood normal.         Behavior: Behavior normal.         Results Review:   I reviewed the patient's new clinical results.    CRP; 8.38, WBC 2.84  CT knee: large joint effusion, lucency lateral tibial plateau.     Assessment & Plan      Right knee pain, previous septic joint  Right knee effusion       Debility      An aspiration has been planned for today.  Further plan to follow based on findings.       I discussed the patient's findings with the patient.     Kenia Jiang, APRN  11/18/22  09:46 EST    CT scan demonstrates increased effusion and lateral plateau destruction. Given recent surgery at [facility] and worsening septic knee which has now turned into osteomyelitis recommend transfer to [facility] to joint specialist. The patient should be evaluated by his surgeon and will likely need repeat debridement, possible antibiotic spacer. Discussed with the patient the severity of the infection and recommendation transfer to [facility] or tertiary care center.    Fabrice Wilson, DO

## 2022-11-18 NOTE — PROGRESS NOTES
Patient to have joint aspiration for recurrent right knee effusion.  Rule out septic knee.  Patient was prepped and draped in appropriate manner and after cleaning the the site with povidone solution the area was draped and approximately 5 cc 1% lidocaine was injected to the area using 18-gauge needle attempted to aspirate and could not get any return.  Consequently, I discussed the case with radiology and he will do CT-guided aspiration.  We will follow labs and treatment clinically appropriate manner.  Patient tolerated well

## 2022-11-18 NOTE — THERAPY TREATMENT NOTE
Inpatient Rehabilitation - Physical Therapy Treatment Note       YVONNE Gaines     Patient Name: Melchor Hardwick III  : 1965  MRN: [id number]    Today's Date: 2022                    Admit Date: 10/28/2022      Visit Dx:     ICD-10-CM ICD-9-CM   1. Staphylococcal arthritis of right knee (HCC)  M00.061 711.06     041.10       Patient Active Problem List   Diagnosis   • Hyperlipidemia   • Hypertensive disorder   • Obesity   • Type 2 diabetes mellitus with hyperglycemia, with long-term current use of insulin (HCC)   • Gas gangrene of foot (HCC)   • Cellulitis in diabetic foot (HCC)   • Other acute osteomyelitis of left foot (HCC)   • Osteomyelitis (HCC)   • Critical illness myopathy   • Staphylococcal arthritis of right knee (HCC)   • Other cirrhosis of liver (HCC)   • MRSA bacteremia   • Endocarditis of tricuspid valve   • Wound of right buttock   • History of osteomyelitis   • Debility       Past Medical History:   Diagnosis Date   • Diabetes mellitus (HCC)    • Hyperlipidemia    • Hypertension    • Pyogenic arthritis of right knee joint, due to unspecified organism (HCC) 2022       Past Surgical History:   Procedure Laterality Date   • ABSCESS DRAINAGE      PERINEUM   • AMPUTATION FOOT Left 2022    Procedure: AMPUTATION FOOT;  Surgeon: Addison Colby MD;  Location: [facility] OR;  Service: Podiatry;  Laterality: Left;   • INCISION AND DRAINAGE LEG Left 05/10/2022    Procedure: INCISION AND DRAINAGE LOWER EXTREMITY;  Surgeon: Addison Colby MD;  Location:  [facility] OR;  Service: Podiatry;  Laterality: Left;   • KNEE INCISION AND DRAINAGE Right 2022    Procedure: KNEE INCISION AND DRAINAGE RIGHT;  Surgeon: Brain Bentley MD;  Location: [facility] OR;  Service: Orthopedics;  Laterality: Right;   • WOUND DEBRIDEMENT Left 2022    Procedure: DEBRIDEMENT FOOT;  Surgeon: Addison Colby MD;  Location: [facility] OR;  Service: Podiatry;  Laterality: Left;       PT ASSESSMENT (last 12 hours)     IRF PT  Evaluation and Treatment     Row Name 11/18/22 1515          PT Time and Intention    Document Type daily treatment;other (see comments)  2nd session  -RF     Mode of Treatment individual therapy;physical therapy  -RF     Patient/Family/Caregiver Comments/Observations Pts sister present for treatment session this date. Education and demonstration provided for transfer training, bed mobility, gait training, and techniques for home safety, and techniques for HEP and stretching. Both parties verbalized understanding and acceptance of education. Pt reports R knee and low back pain this date.  -RF     Row Name 11/18/22 1515          General Information    Patient Profile Reviewed yes  -RF     Existing Precautions/Restrictions fall;brace worn when out of bed  air cast LLE  -RF     Row Name 11/18/22 1515          Cognition/Psychosocial    Affect/Mental Status (Cognition) WFL  -RF     Follows Commands (Cognition) WFL  -RF     Personal Safety Interventions gait belt;nonskid shoes/slippers when out of bed;fall prevention program maintained  -RF     Cognitive Function WFL  -RF     Row Name 11/18/22 1515          Mobility    Additional Documentation Wheelchair Mobility/Management (Group)  -RF     Row Name 11/18/22 1515          Bed Mobility    Bed Mobility bed mobility (all) activities  -RF     All Activities, [place] (Bed Mobility) modified independence  -RF     Supine-Sit [place] (Bed Mobility) standby assist  -RF     Sit-Supine [place] (Bed Mobility) minimum assist (75% patient effort)  Requires assistance with LEs  -RF     Assistive Device (Bed Mobility) bed rails  -     Row Name 11/18/22 1515          Transfer Assessment/Treatment    Transfers bed-chair transfer;chair-bed transfer;sit-stand transfer;stand-sit transfer;car transfer  -     Row Name 11/18/22 1510          Bed-Chair Transfer    Bed-Chair [place] (Transfers) contact guard;standby assist  -RF     Assistive Device (Bed-Chair Transfers)  walker, front-wheeled  -RF     Row Name 11/18/22 1515          Chair-Bed Transfer    Chair-Bed [place] (Transfers) contact guard;standby assist  -RF     Assistive Device (Chair-Bed Transfers) wheelchair;walker, front-wheeled  -RF     Row Name 11/18/22 1515          Sit-Stand Transfer    Sit-Stand [place] (Transfers) contact guard;standby assist  -RF     Assistive Device (Sit-Stand Transfers) walker, front-wheeled  -RF     Row Name 11/18/22 1515          Stand-Sit Transfer    Stand-Sit [place] (Transfers) contact guard;standby assist  -RF     Assistive Device (Stand-Sit Transfers) wheelchair;walker, front-wheeled  -RF     Row Name 11/18/22 1515          Car Transfer    Type (Car Transfer) stand pivot/stand step  2 trials  -RF     [place] Level (Car Transfer) contact guard;standby assist  -RF     Assistive Device (Car Transfer) wheelchair;walker, front-wheeled  -RF     Comment, (Car Transfer) elevated height  -RF     Row Name 11/18/22 1515          Gait/Stairs (Locomotion)    Gait/Stairs Locomotion gait/ambulation independence;gait/ambulation assistive device;distance ambulated  -RF     [place] Level (Gait) standby assist;supervision  -RF     Assistive Device (Gait) walker, front-wheeled  -RF     Distance in Feet (Gait) 160  -RF     Pattern (Gait) step-to;step-through  -RF     Bilateral Gait Deviations forward flexed posture  -RF     Right Sided Gait Deviations heel strike decreased;weight shift ability decreased  -RF     Comment, (Gait/Stairs) Increased knee flexion noted bilaterally; forward flexed posture noted due to c/o low back pain. Minimal unsteadiness observed with no LOB noted.  -RF     Row Name 11/18/22 1515          Wheelchair Mobility/Management    Method of Wheelchair Locomotion (Mobility) bimanual (upper extremity) propulsion  -RF     Mobility Activities (Wheelchair) forward propulsion  -RF     Forward Propulsion [place] (Wheelchair) independent  -RF     Distance Propelled  in Feet (Wheelchair) 160  -RF     Row Name 11/18/22 1515          Safety Issues, Functional Mobility    Impairments Affecting Function (Mobility) balance;endurance/activity tolerance;pain;range of motion (ROM);postural/trunk control  -RF     Row Name 11/18/22 1515          Positioning and Restraints    Pre-Treatment Position sitting in chair/recliner  -RF     Post Treatment Position bed  -RF     In Bed supine;call light within reach;encouraged to call for assist;with family/caregiver  -RF     Row Name 11/18/22 1515          Daily Progress Summary (PT)    Functional Goal Overall Progress (PT) progressing toward functional goals as expected  -RF     Daily Progress Summary (PT) Improving independence noted this date with functional mobility and gait training. Progress hindered by chronic LBP and R knee pain. R knee ROM deficits continually noted; education provided to pt and sister on techniques for stretching and improtance for continued improvements. Continued skilled crae required for further LE strengthening to ensure maximum safe function upon D/C.  -RF     Impairments Still Limiting Function (PT) functional activity tolerance impairment;pain;range of motion deficit;strength deficit  -RF     Recommendations (PT) Continue per current POC  -RF     Row Name 11/18/22 1515          IRF PT Goals    Bed Mobility Goal Selection (PT-IRF) bed mobility, PT goal 1  -RF     Transfer Goal Selection (PT-IRF) transfers, PT goal 1  -RF     Gait (Walking Locomotion) Goal Selection (PT-IRF) gait, PT goal 1  -RF     Row Name 11/18/22 1515          Bed Mobility Goal 1 (PT-IRF)    Activity/Assistive Device (Bed Mobility Goal 1, PT-IRF) bed mobility activities, all  -RF     [place] Level (Bed Mobility Goal 1, PT-IRF) independent  -RF     Time Frame (Bed Mobility Goal 1, PT-IRF) long-term goal (LTG)  -RF     Progress/Outcomes (Bed Mobility Goal 1, PT-IRF) goal met  -RF     Row Name 11/18/22 1515          Transfer Goal 1 (PT-IRF)     Activity/Assistive Device (Transfer Goal 1, PT-IRF) sit-to-stand/stand-to-sit;bed-to-chair/chair-to-bed;walker, rolling  -RF     [place] Level (Transfer Goal 1, PT-IRF) modified independence  -RF     Time Frame (Transfer Goal 1, PT-IRF) long-term goal (LTG)  -RF     Progress/Outcomes (Transfer Goal 1, PT-IRF) new goal  -RF     Row Name 11/18/22 1515          Gait/Walking Locomotion Goal 1 (PT-IRF)    Activity/Assistive Device (Gait/Walking Locomotion Goal 1, PT-IRF) gait (walking locomotion);assistive device use  -RF     Gait/Walking Locomotion Distance Goal 1 (PT-IRF) 100'  -RF     [place] Level (Gait/Walking Locomotion Goal 1, PT-IRF) modified independence  -RF     Time Frame (Gait/Walking Locomotion Goal 1, PT-IRF) long-term goal (LTG)  -RF     Progress/Outcomes (Gait/Walking Locomotion Goal 1, PT-IRF) goal revised this date  -RF           User Key  (r) = Recorded By, (t) = Taken By, (c) = Cosigned By    Initials Name Provider Type    RF Meron Lei, PTA Physical Therapist Assistant              Wound 09/21/22 1532 Right anterior knee Incision (Active)   Dressing Appearance open to air 11/18/22 0805   Closure Approximated 11/18/22 0805   Base dry;clean 11/18/22 0805   Drainage Amount none 11/18/22 0805   Care, Wound cleansed with 11/17/22 1920   Dressing Care open to air 11/17/22 1920       Wound 10/28/22 1827 Right coccyx Pressure Injury (Active)   Dressing Appearance open to air 11/18/22 0805   Closure Open to air 11/18/22 0805   Base non-blanchable;blanchable;pink 11/18/22 0805   Drainage Amount none 11/18/22 0805   Care, Wound barrier applied;pressure removed 11/18/22 0805   Dressing Care open to air 11/18/22 0805     Physical Therapy Education     Title: PT OT SLP Therapies (Done)     Topic: Physical Therapy (Done)     Point: Mobility training (Done)     Learning Progress Summary           Patient Acceptance, E,TB, VU by RF at 11/18/2022 1525    Acceptance, E,TB, VU by RF at 11/17/2022 1528     Acceptance, E,TB, VU by DL at 11/17/2022 0233    Acceptance, E,TB, VU by RF at 11/16/2022 1606    Acceptance, E,TB, VU by DL at 11/15/2022 2033    Acceptance, E,TB, VU by RF at 11/15/2022 1541    Acceptance, E,TB, VU by DL at 11/15/2022 0305    Acceptance, E,TB, VU by RF at 11/14/2022 1528    Acceptance, E,TB, VU by RF at 11/10/2022 1551    Acceptance, E,TB, VU by RF at 11/9/2022 1622    Acceptance, E,TB, VU by DG at 11/9/2022 0050    Acceptance, E,TB, VU by RF at 11/8/2022 1539    Acceptance, E,TB, VU by DG at 11/8/2022 0114    Acceptance, E,TB, VU by RF at 11/7/2022 1607    Acceptance, E,TB, VU by DG at 11/6/2022 2023    Acceptance, E,TB, VU by RF at 11/4/2022 1551    Acceptance, E,TB, VU,DU by HR at 11/3/2022 1541    Acceptance, E,D, VU,NR by RG at 11/3/2022 1442    Acceptance, E,TB, VU by DG at 11/1/2022 2016    Acceptance, E,D, VU,NR by RG at 11/1/2022 1541    Acceptance, E,TB, VU by DG at 10/31/2022 2315    Acceptance, E,D, VU,NR by RG at 10/31/2022 1446                   Point: Home exercise program (Done)     Learning Progress Summary           Patient Acceptance, E,TB, VU by RF at 11/18/2022 1525    Acceptance, E,TB, VU by RF at 11/17/2022 1528    Acceptance, E,TB, VU by DL at 11/17/2022 0233    Acceptance, E,TB, VU by RF at 11/16/2022 1606    Acceptance, E,TB, VU by DL at 11/15/2022 2033    Acceptance, E,TB, VU by RF at 11/15/2022 1541    Acceptance, E,TB, VU by DL at 11/15/2022 0305    Acceptance, E,TB, VU by RF at 11/14/2022 1528    Acceptance, E,TB, VU by RF at 11/10/2022 1551    Acceptance, E,TB, VU by RF at 11/9/2022 1622    Acceptance, E,TB, VU by DG at 11/9/2022 0050    Acceptance, E,TB, VU by RF at 11/8/2022 1539    Acceptance, E,TB, VU by DG at 11/8/2022 0114    Acceptance, E,TB, VU by RF at 11/7/2022 1607    Acceptance, E,TB, VU by DG at 11/6/2022 2023    Acceptance, E,TB, VU by RF at 11/4/2022 1551    Acceptance, E,TB, VU,DU by HR at 11/3/2022 1541    Acceptance, E,D, VU,NR by RG at 11/3/2022  1442    Acceptance, E,TB, VU by DG at 11/1/2022 2016    Acceptance, E,D, VU,NR by RG at 11/1/2022 1541    Acceptance, E,TB, VU by DG at 10/31/2022 2315    Acceptance, E,D, VU,NR by RG at 10/31/2022 1446                   Point: Body mechanics (Done)     Learning Progress Summary           Patient Acceptance, E,TB, VU by RF at 11/18/2022 1525    Acceptance, E,TB, VU by RF at 11/17/2022 1528    Acceptance, E,TB, VU by DL at 11/17/2022 0233    Acceptance, E,TB, VU by RF at 11/16/2022 1606    Acceptance, E,TB, VU by DL at 11/15/2022 2033    Acceptance, E,TB, VU by RF at 11/15/2022 1541    Acceptance, E,TB, VU by DL at 11/15/2022 0305    Acceptance, E,TB, VU by RF at 11/14/2022 1528    Acceptance, E,TB, VU by RF at 11/10/2022 1551    Acceptance, E,TB, VU by RF at 11/9/2022 1622    Acceptance, E,TB, VU by DG at 11/9/2022 0050    Acceptance, E,TB, VU by RF at 11/8/2022 1539    Acceptance, E,TB, VU by DG at 11/8/2022 0114    Acceptance, E,TB, VU by RF at 11/7/2022 1607    Acceptance, E,TB, VU by DG at 11/6/2022 2023    Acceptance, E,TB, VU by RF at 11/4/2022 1551    Acceptance, E,TB, VU,DU by HR at 11/3/2022 1541    Acceptance, E,D, VU,NR by RG at 11/3/2022 1442    Acceptance, E,TB, VU by DG at 11/1/2022 2016    Acceptance, E,D, VU,NR by RG at 11/1/2022 1541    Acceptance, E,TB, VU by DG at 10/31/2022 2315    Acceptance, E,D, VU,NR by RG at 10/31/2022 1446                   Point: Precautions (Done)     Learning Progress Summary           Patient Acceptance, E,TB, VU by RF at 11/18/2022 1525    Acceptance, E,TB, VU by RF at 11/17/2022 1528    Acceptance, E,TB, VU by DL at 11/17/2022 0233    Acceptance, E,TB, VU by RF at 11/16/2022 1606    Acceptance, E,TB, VU by DL at 11/15/2022 2033    Acceptance, E,TB, VU by RF at 11/15/2022 1541    Acceptance, E,TB, VU by DL at 11/15/2022 0305    Acceptance, E,TB, VU by RF at 11/14/2022 1528    Acceptance, E,TB, VU by RF at 11/10/2022 1551    Acceptance, E,TB, VU by RF at 11/9/2022 1622     Acceptance, E,TB, VU by DG at 11/9/2022 0050    Acceptance, E,TB, VU by RF at 11/8/2022 1539    Acceptance, E,TB, VU by DG at 11/8/2022 0114    Acceptance, E,TB, VU by RF at 11/7/2022 1607    Acceptance, E,TB, VU by DG at 11/6/2022 2023    Acceptance, E,TB, VU by RF at 11/4/2022 1551    Acceptance, E,TB, VU,DU by HR at 11/3/2022 1541    Acceptance, E,D, VU,NR by RG at 11/3/2022 1442    Acceptance, E,TB, VU by DG at 11/1/2022 2016    Acceptance, E,D, VU,NR by RG at 11/1/2022 1541    Acceptance, E,TB, VU by DG at 10/31/2022 2315    Acceptance, E,D, VU,NR by RG at 10/31/2022 1446                               User Key     Initials Effective Dates Name Provider Type Discipline    DG 06/16/21 -  Phyllis Yañez, RN Registered Nurse Nurse    RF 06/16/21 -  Meron Lei PTA Physical Therapist Assistant PT    RG 06/16/21 -  Michi Mckeon, LIZETT Physical Therapist Assistant PT    HR 01/14/22 -  Hanna Murphy, LIZETT Physical Therapist Assistant PT    DL 03/16/22 -  Hellen Mcdonnell, RN Registered Nurse Nurse                PT Recommendation and Plan    Frequency of Treatment (PT): 5 times per week, 90 minutes per session     Daily Progress Summary (PT)  Functional Goal Overall Progress (PT): progressing toward functional goals as expected  Daily Progress Summary (PT): Improving independence noted this date with functional mobility and gait training. Progress hindered by chronic LBP and R knee pain. R knee ROM deficits continually noted; education provided to pt and sister on techniques for stretching and improtance for continued improvements. Continued skilled crae required for further LE strengthening to ensure maximum safe function upon D/C.  Impairments Still Limiting Function (PT): functional activity tolerance impairment, pain, range of motion deficit, strength deficit  Recommendations (PT): Continue per current POC               Time Calculation:      PT Charges     Row Name 11/18/22 1525             Time Calculation     Start Time 1045  -RF      Stop Time 1130  -RF      Time Calculation (min) 45 min  -RF      PT Received On 11/18/22  -RF      PT - Next Appointment 11/21/22  -RF      PT Goal Re-Cert Due Date 11/25/22  -RF         Time Calculation- PT    Total Timed Code Minutes- PT 45 minute(s)  -RF            User Key  (r) = Recorded By, (t) = Taken By, (c) = Cosigned By    Initials Name Provider Type    RF Meron Lei, LIZETT Physical Therapist Assistant                Therapy Charges for Today     Code Description Service Date Service Provider Modifiers Qty    62772903943 HC GAIT TRAINING EA 15 MIN 11/17/2022 Meron Lei, PTA GP, CQ 2    68791842379 HC PT THER PROC EA 15 MIN 11/17/2022 Meron Lei, PTA GP, CQ 2    75779952758 HC PT THERAPEUTIC ACT EA 15 MIN 11/17/2022 Meron Lei, PTA GP, CQ 2    39475372471 HC GAIT TRAINING EA 15 MIN 11/18/2022 Meron Lei, PTA GP, CQ 1    34462485307 HC PT THERAPEUTIC ACT EA 15 MIN 11/18/2022 Meron Lei, PTA GP, CQ 2                   Meron Lei PTA  11/18/2022

## 2022-11-18 NOTE — PROGRESS NOTES
I discussed the case with Dr. Russell and .  Patient has findings on CT scan consistent with large effusion on the right knee with likely osteomyelitis involving the tibial plateau.  Dr. Russell felt at this time that would be in patient's best interest to follow-up with his initial surgeon of record.  I discussed the case with Dr Bentley who stated that since patient is not acutely septic that we should continue the IV antibiotics and he will see the patient in the office and we are in the process of arranging appointment hopefully for early next week and he will aspirate the knee at that time consider treatment options.  He states that sometimes patient may require spacer in the knee.  Patient but he states at this time that it is reasonable for us to continue current course until he is able to see the patient.  Discussed this with the patient and she did state that Dr. Lopez stated that to that we should forego aspiration or MRI at this time and allow him to continue the work-up.

## 2022-11-18 NOTE — THERAPY TREATMENT NOTE
Inpatient Rehabilitation - Physical Therapy Treatment Note       YVONNE Gaines     Patient Name: Melchor Hardwick III  : 1965  MRN: [id number]    Today's Date: 2022                    Admit Date: 10/28/2022      Visit Dx:     ICD-10-CM ICD-9-CM   1. Staphylococcal arthritis of right knee (HCC)  M00.061 711.06     041.10       Patient Active Problem List   Diagnosis   • Hyperlipidemia   • Hypertensive disorder   • Obesity   • Type 2 diabetes mellitus with hyperglycemia, with long-term current use of insulin (HCC)   • Gas gangrene of foot (HCC)   • Cellulitis in diabetic foot (HCC)   • Other acute osteomyelitis of left foot (HCC)   • Osteomyelitis (HCC)   • Critical illness myopathy   • Staphylococcal arthritis of right knee (HCC)   • Other cirrhosis of liver (HCC)   • MRSA bacteremia   • Endocarditis of tricuspid valve   • Wound of right buttock   • History of osteomyelitis   • Debility       Past Medical History:   Diagnosis Date   • Diabetes mellitus (HCC)    • Hyperlipidemia    • Hypertension    • Pyogenic arthritis of right knee joint, due to unspecified organism (HCC) 2022       Past Surgical History:   Procedure Laterality Date   • ABSCESS DRAINAGE      PERINEUM   • AMPUTATION FOOT Left 2022    Procedure: AMPUTATION FOOT;  Surgeon: Addison Colby MD;  Location: [facility] OR;  Service: Podiatry;  Laterality: Left;   • INCISION AND DRAINAGE LEG Left 05/10/2022    Procedure: INCISION AND DRAINAGE LOWER EXTREMITY;  Surgeon: Addison Colby MD;  Location:  [facility] OR;  Service: Podiatry;  Laterality: Left;   • KNEE INCISION AND DRAINAGE Right 2022    Procedure: KNEE INCISION AND DRAINAGE RIGHT;  Surgeon: Brain Bentley MD;  Location: [facility] OR;  Service: Orthopedics;  Laterality: Right;   • WOUND DEBRIDEMENT Left 2022    Procedure: DEBRIDEMENT FOOT;  Surgeon: Addison Colby MD;  Location: [facility] OR;  Service: Podiatry;  Laterality: Left;       PT ASSESSMENT (last 12 hours)     IRF PT  Evaluation and Treatment     Row Name 11/18/22 1539 11/18/22 1515       PT Time and Intention    Document Type daily treatment;other (see comments)  first session  -HR daily treatment;other (see comments)  2nd session  -RF    Mode of Treatment individual therapy;physical therapy  -HR individual therapy;physical therapy  -RF    Patient/Family/Caregiver Comments/Observations Pt and RN in agreement for PT. Pt comleted sitting exercises with added Wt and resistance for strengthening and mobility. Pt swelling in R knee limiting mobility for exercises, but participates well.  -HR Pts sister present for treatment session this date. Education and demonstration provided for transfer training, bed mobility, gait training, and techniques for home safety, and techniques for HEP and stretching. Both parties verbalized understanding and acceptance of education. Pt reports R knee and low back pain this date.  -RF    Row Name 11/18/22 1539 11/18/22 1515       General Information    Patient Profile Reviewed yes  -HR yes  -RF    Existing Precautions/Restrictions fall;brace worn when out of bed  air cast LLE  -HR fall;brace worn when out of bed  air cast LLE  -RF    Row Name 11/18/22 1539 11/18/22 1515       Cognition/Psychosocial    Affect/Mental Status (Cognition) WFL  -HR WFL  -RF    Follows Commands (Cognition) WFL  -HR WFL  -RF    Personal Safety Interventions fall prevention program maintained;gait belt;supervised activity;nonskid shoes/slippers when out of bed  -HR gait belt;nonskid shoes/slippers when out of bed;fall prevention program maintained  -RF    Cognitive Function WFL  -HR WFL  -RF    Row Name 11/18/22 1515          Mobility    Additional Documentation Wheelchair Mobility/Management (Group)  -RF     Row Name 11/18/22 1515          Bed Mobility    Bed Mobility bed mobility (all) activities  -RF     All Activities, [place] (Bed Mobility) modified independence  -RF     Supine-Sit [place] (Bed Mobility) standby  assist  -RF     Sit-Supine [place] (Bed Mobility) minimum assist (75% patient effort)  Requires assistance with LEs  -RF     Assistive Device (Bed Mobility) bed rails  -RF     Row Name 11/18/22 1515          Transfer Assessment/Treatment    Transfers bed-chair transfer;chair-bed transfer;sit-stand transfer;stand-sit transfer;car transfer  -RF     Row Name 11/18/22 1515          Bed-Chair Transfer    Bed-Chair [place] (Transfers) contact guard;standby assist  -RF     Assistive Device (Bed-Chair Transfers) walker, front-wheeled  -RF     Row Name 11/18/22 1515          Chair-Bed Transfer    Chair-Bed [place] (Transfers) contact guard;standby assist  -RF     Assistive Device (Chair-Bed Transfers) wheelchair;walker, front-wheeled  -RF     Row Name 11/18/22 1515          Sit-Stand Transfer    Sit-Stand [place] (Transfers) contact guard;standby assist  -RF     Assistive Device (Sit-Stand Transfers) walker, front-wheeled  -RF     Row Name 11/18/22 1515          Stand-Sit Transfer    Stand-Sit [place] (Transfers) contact guard;standby assist  -RF     Assistive Device (Stand-Sit Transfers) wheelchair;walker, front-wheeled  -RF     Row Name 11/18/22 1515          Car Transfer    Type (Car Transfer) stand pivot/stand step  2 trials  -RF     [place] Level (Car Transfer) contact guard;standby assist  -RF     Assistive Device (Car Transfer) wheelchair;walker, front-wheeled  -RF     Comment, (Car Transfer) elevated height  -RF     Row Name 11/18/22 1515          Gait/Stairs (Locomotion)    Gait/Stairs Locomotion gait/ambulation independence;gait/ambulation assistive device;distance ambulated  -RF     [place] Level (Gait) standby assist;supervision  -RF     Assistive Device (Gait) walker, front-wheeled  -RF     Distance in Feet (Gait) 160  -RF     Pattern (Gait) step-to;step-through  -RF     Bilateral Gait Deviations forward flexed posture  -RF     Right Sided Gait Deviations heel strike  decreased;weight shift ability decreased  -RF     Comment, (Gait/Stairs) Increased knee flexion noted bilaterally; forward flexed posture noted due to c/o low back pain. Minimal unsteadiness observed with no LOB noted.  -RF     Row Name 11/18/22 1515          Wheelchair Mobility/Management    Method of Wheelchair Locomotion (Mobility) bimanual (upper extremity) propulsion  -RF     Mobility Activities (Wheelchair) forward propulsion  -RF     Forward Propulsion [place] (Wheelchair) independent  -RF     Distance Propelled in Feet (Wheelchair) 160  -RF     Row Name 11/18/22 1539 11/18/22 1515       Safety Issues, Functional Mobility    Impairments Affecting Function (Mobility) balance;endurance/activity tolerance;pain;range of motion (ROM);postural/trunk control  -HR balance;endurance/activity tolerance;pain;range of motion (ROM);postural/trunk control  -RF    Row Name 11/18/22 1539          Balance    Comment, Balance Sitting 2#: AP, LAQ, march, ball sq, belt/ball resistance, BTB: HS curls, hip abd, march.  -HR     Row Name 11/18/22 1539          Hip (Therapeutic Exercise)    Hip Strengthening (Therapeutic Exercise) bilateral;flexion;extension;aBduction;aDduction;marching while seated;sitting;2 lb free weight;resistance band;blue  -HR     Row Name 11/18/22 1539          Knee (Therapeutic Exercise)    Knee Strengthening (Therapeutic Exercise) bilateral;flexion;extension;marching while seated;LAQ (long arc quad);hamstring curls;sitting;2 lb free weight;resistance band;blue  -HR     Row Name 11/18/22 1539          Ankle (Therapeutic Exercise)    Ankle Strengthening (Therapeutic Exercise) bilateral;dorsiflexion;plantarflexion;sitting;2 lb free weight  -HR     Row Name 11/18/22 1539 11/18/22 1515       Positioning and Restraints    Pre-Treatment Position other (comment)  WC in Room  -HR sitting in chair/recliner  -RF    Post Treatment Position wheelchair  -HR bed  -RF    In Bed with PT  -HR supine;call light within  reach;encouraged to call for assist;with family/caregiver  -RF    Row Name 11/18/22 1539 11/18/22 1515       Daily Progress Summary (PT)    Functional Goal Overall Progress (PT) -- progressing toward functional goals as expected  -RF    Daily Progress Summary (PT) -- Improving independence noted this date with functional mobility and gait training. Progress hindered by chronic LBP and R knee pain. R knee ROM deficits continually noted; education provided to pt and sister on techniques for stretching and improtance for continued improvements. Continued skilled crae required for further LE strengthening to ensure maximum safe function upon D/C.  -RF    Impairments Still Limiting Function (PT) functional activity tolerance impairment;pain;range of motion deficit;strength deficit  -HR functional activity tolerance impairment;pain;range of motion deficit;strength deficit  -RF    Recommendations (PT) -- Continue per current POC  -RF    Row Name 11/18/22 1539 11/18/22 1515       IRF PT Goals    Bed Mobility Goal Selection (PT-IRF) bed mobility, PT goal 1  -HR bed mobility, PT goal 1  -RF    Transfer Goal Selection (PT-IRF) transfers, PT goal 1  -HR transfers, PT goal 1  -RF    Gait (Walking Locomotion) Goal Selection (PT-IRF) gait, PT goal 1  -HR gait, PT goal 1  -RF    Row Name 11/18/22 1539 11/18/22 1515       Bed Mobility Goal 1 (PT-IRF)    Activity/Assistive Device (Bed Mobility Goal 1, PT-IRF) bed mobility activities, all  -HR bed mobility activities, all  -RF    [place] Level (Bed Mobility Goal 1, PT-IRF) independent  -HR independent  -RF    Time Frame (Bed Mobility Goal 1, PT-IRF) long-term goal (LTG)  -HR long-term goal (LTG)  -RF    Progress/Outcomes (Bed Mobility Goal 1, PT-IRF) goal met  -HR goal met  -RF    Row Name 11/18/22 1539 11/18/22 1515       Transfer Goal 1 (PT-IRF)    Activity/Assistive Device (Transfer Goal 1, PT-IRF) sit-to-stand/stand-to-sit;bed-to-chair/chair-to-bed;walker, rolling  -HR  sit-to-stand/stand-to-sit;bed-to-chair/chair-to-bed;walker, rolling  -RF    [place] Level (Transfer Goal 1, PT-IRF) modified independence  -HR modified independence  -RF    Time Frame (Transfer Goal 1, PT-IRF) long-term goal (LTG)  -HR long-term goal (LTG)  -RF    Progress/Outcomes (Transfer Goal 1, PT-IRF) new goal  -HR new goal  -RF    Row Name 11/18/22 1539 11/18/22 1515       Gait/Walking Locomotion Goal 1 (PT-IRF)    Activity/Assistive Device (Gait/Walking Locomotion Goal 1, PT-IRF) gait (walking locomotion);assistive device use  -HR gait (walking locomotion);assistive device use  -RF    Gait/Walking Locomotion Distance Goal 1 (PT-IRF) 100'  -'  -RF    [place] (Gait/Walking Locomotion Goal 1, PT-IRF) modified independence  -HR modified independence  -RF    Time Frame (Gait/Walking Locomotion Goal 1, PT-IRF) long-term goal (LTG)  -HR long-term goal (LTG)  -RF    Progress/Outcomes (Gait/Walking Locomotion Goal 1, PT-IRF) goal revised this date  -HR goal revised this date  -RF          User Key  (r) = Recorded By, (t) = Taken By, (c) = Cosigned By    Initials Name Provider Type    RF Meron Lei, PTA Physical Therapist Assistant    HR Hanna Murphy, LIZETT Physical Therapist Assistant              Wound 09/21/22 1532 Right anterior knee Incision (Active)   Dressing Appearance open to air 11/18/22 0805   Closure Approximated 11/18/22 0805   Base dry;clean 11/18/22 0805   Drainage Amount none 11/18/22 0805   Care, Wound cleansed with 11/17/22 1920   Dressing Care open to air 11/17/22 1920       Wound 10/28/22 1827 Right coccyx Pressure Injury (Active)   Dressing Appearance open to air 11/18/22 0805   Closure Open to air 11/18/22 0805   Base non-blanchable;blanchable;pink 11/18/22 0805   Drainage Amount none 11/18/22 0805   Care, Wound barrier applied;pressure removed 11/18/22 0805   Dressing Care open to air 11/18/22 0805     Physical Therapy Education     Title: PT OT SLP Therapies (Done)      Topic: Physical Therapy (Done)     Point: Mobility training (Done)     Learning Progress Summary           Patient Acceptance, E,TB, VU,DU by HR at 11/18/2022 1546    Acceptance, E,TB, VU by RF at 11/18/2022 1525    Acceptance, E,TB, VU by RF at 11/17/2022 1528    Acceptance, E,TB, VU by DL at 11/17/2022 0233    Acceptance, E,TB, VU by RF at 11/16/2022 1606    Acceptance, E,TB, VU by DL at 11/15/2022 2033    Acceptance, E,TB, VU by RF at 11/15/2022 1541    Acceptance, E,TB, VU by DL at 11/15/2022 0305    Acceptance, E,TB, VU by RF at 11/14/2022 1528    Acceptance, E,TB, VU by RF at 11/10/2022 1551    Acceptance, E,TB, VU by RF at 11/9/2022 1622    Acceptance, E,TB, VU by DG at 11/9/2022 0050    Acceptance, E,TB, VU by RF at 11/8/2022 1539    Acceptance, E,TB, VU by DG at 11/8/2022 0114    Acceptance, E,TB, VU by RF at 11/7/2022 1607    Acceptance, E,TB, VU by DG at 11/6/2022 2023    Acceptance, E,TB, VU by RF at 11/4/2022 1551    Acceptance, E,TB, VU,DU by HR at 11/3/2022 1541    Acceptance, E,D, VU,NR by RG at 11/3/2022 1442    Acceptance, E,TB, VU by DG at 11/1/2022 2016    Acceptance, E,D, VU,NR by RG at 11/1/2022 1541    Acceptance, E,TB, VU by DG at 10/31/2022 2315    Acceptance, E,D, VU,NR by RG at 10/31/2022 1446                   Point: Home exercise program (Done)     Learning Progress Summary           Patient Acceptance, E,TB, VU,DU by HR at 11/18/2022 1546    Acceptance, E,TB, VU by RF at 11/18/2022 1525    Acceptance, E,TB, VU by RF at 11/17/2022 1528    Acceptance, E,TB, VU by DL at 11/17/2022 0233    Acceptance, E,TB, VU by RF at 11/16/2022 1606    Acceptance, E,TB, VU by DL at 11/15/2022 2033    Acceptance, E,TB, VU by RF at 11/15/2022 1541    Acceptance, E,TB, VU by DL at 11/15/2022 0305    Acceptance, E,TB, VU by RF at 11/14/2022 1528    Acceptance, E,TB, VU by RF at 11/10/2022 1551    Acceptance, E,TB, VU by RF at 11/9/2022 1622    Acceptance, E,TB, VU by DG at 11/9/2022 0050    Acceptance,  E,TB, VU by RF at 11/8/2022 1539    Acceptance, E,TB, VU by DG at 11/8/2022 0114    Acceptance, E,TB, VU by RF at 11/7/2022 1607    Acceptance, E,TB, VU by DG at 11/6/2022 2023    Acceptance, E,TB, VU by RF at 11/4/2022 1551    Acceptance, E,TB, VU,DU by HR at 11/3/2022 1541    Acceptance, E,D, VU,NR by RG at 11/3/2022 1442    Acceptance, E,TB, VU by DG at 11/1/2022 2016    Acceptance, E,D, VU,NR by RG at 11/1/2022 1541    Acceptance, E,TB, VU by DG at 10/31/2022 2315    Acceptance, E,D, VU,NR by RG at 10/31/2022 1446                   Point: Body mechanics (Done)     Learning Progress Summary           Patient Acceptance, E,TB, VU,DU by HR at 11/18/2022 1546    Acceptance, E,TB, VU by RF at 11/18/2022 1525    Acceptance, E,TB, VU by RF at 11/17/2022 1528    Acceptance, E,TB, VU by DL at 11/17/2022 0233    Acceptance, E,TB, VU by RF at 11/16/2022 1606    Acceptance, E,TB, VU by DL at 11/15/2022 2033    Acceptance, E,TB, VU by RF at 11/15/2022 1541    Acceptance, E,TB, VU by DL at 11/15/2022 0305    Acceptance, E,TB, VU by RF at 11/14/2022 1528    Acceptance, E,TB, VU by RF at 11/10/2022 1551    Acceptance, E,TB, VU by RF at 11/9/2022 1622    Acceptance, E,TB, VU by DG at 11/9/2022 0050    Acceptance, E,TB, VU by RF at 11/8/2022 1539    Acceptance, E,TB, VU by DG at 11/8/2022 0114    Acceptance, E,TB, VU by RF at 11/7/2022 1607    Acceptance, E,TB, VU by DG at 11/6/2022 2023    Acceptance, E,TB, VU by RF at 11/4/2022 1551    Acceptance, E,TB, VU,DU by HR at 11/3/2022 1541    Acceptance, E,D, VU,NR by RG at 11/3/2022 1442    Acceptance, E,TB, VU by DG at 11/1/2022 2016    Acceptance, E,D, VU,NR by RG at 11/1/2022 1541    Acceptance, E,TB, VU by DG at 10/31/2022 2315    Acceptance, E,D, VU,NR by RG at 10/31/2022 1446                   Point: Precautions (Done)     Learning Progress Summary           Patient Acceptance, E,TB, VU,DU by HR at 11/18/2022 1546    Acceptance, E,TB, VU by RF at 11/18/2022 1525    Acceptance,  E,TB, VU by RF at 11/17/2022 1528    Acceptance, E,TB, VU by DL at 11/17/2022 0233    Acceptance, E,TB, VU by RF at 11/16/2022 1606    Acceptance, E,TB, VU by DL at 11/15/2022 2033    Acceptance, E,TB, VU by RF at 11/15/2022 1541    Acceptance, E,TB, VU by DL at 11/15/2022 0305    Acceptance, E,TB, VU by RF at 11/14/2022 1528    Acceptance, E,TB, VU by RF at 11/10/2022 1551    Acceptance, E,TB, VU by RF at 11/9/2022 1622    Acceptance, E,TB, VU by DG at 11/9/2022 0050    Acceptance, E,TB, VU by RF at 11/8/2022 1539    Acceptance, E,TB, VU by DG at 11/8/2022 0114    Acceptance, E,TB, VU by RF at 11/7/2022 1607    Acceptance, E,TB, VU by DG at 11/6/2022 2023    Acceptance, E,TB, VU by RF at 11/4/2022 1551    Acceptance, E,TB, VU,DU by HR at 11/3/2022 1541    Acceptance, E,D, VU,NR by RG at 11/3/2022 1442    Acceptance, E,TB, VU by DG at 11/1/2022 2016    Acceptance, E,D, VU,NR by RG at 11/1/2022 1541    Acceptance, E,TB, VU by DG at 10/31/2022 2315    Acceptance, E,D, VU,NR by RG at 10/31/2022 1446                               User Key     Initials Effective Dates Name Provider Type Discipline    DG 06/16/21 -  Phyllis Yañez RN Registered Nurse Nurse    RF 06/16/21 -  Meron Lei PTA Physical Therapist Assistant PT    RG 06/16/21 -  Michi Mckeon PTA Physical Therapist Assistant PT    HR 01/14/22 -  Hanna Murphy PTA Physical Therapist Assistant PT    DL 03/16/22 -  Hellen Mcdonnell, RN Registered Nurse Nurse                PT Recommendation and Plan    Frequency of Treatment (PT): 5 times per week, 90 minutes per session     Daily Progress Summary (PT)  Impairments Still Limiting Function (PT): functional activity tolerance impairment, pain, range of motion deficit, strength deficit               Time Calculation:      PT Charges     Row Name 11/18/22 1547 11/18/22 1525          Time Calculation    Start Time 1000  -HR 1045  -RF     Stop Time 1045  -HR 1130  -RF     Time Calculation (min) 45 min  -HR 45  min  -RF     PT Received On 11/18/22  -HR 11/18/22  -RF     PT - Next Appointment -- 11/21/22  -RF     PT Goal Re-Cert Due Date -- 11/25/22  -RF        Time Calculation- PT    Total Timed Code Minutes- PT 45 minute(s)  -HR 45 minute(s)  -RF           User Key  (r) = Recorded By, (t) = Taken By, (c) = Cosigned By    Initials Name Provider Type    RF Meron Lei PTA Physical Therapist Assistant    HR Hanna Murphy PTA Physical Therapist Assistant                Therapy Charges for Today     Code Description Service Date Service Provider Modifiers Qty    26077950317 HC PT THER PROC EA 15 MIN 11/18/2022 Hanna Murphy PTA GP, CQ 2    69356984149 HC PT THERAPEUTIC ACT EA 15 MIN 11/18/2022 Hanna Murphy PTA GP, CQ 1                   Hanna Murphy PTA  11/18/2022

## 2022-11-18 NOTE — PROGRESS NOTES
PROGRESS NOTE         Patient Identification:  Name:  Melchor Hardwick III  Age:  57 y.o.  Sex:  male  :  1965  MRN:  [id number]  Visit Number:  [id number]  Primary Care Provider:  Missy Up APRN         LOS: 21 days       ----------------------------------------------------------------------------------------------------------------------  Subjective       Chief Complaints:    No chief complaint on file.        Interval History:      The patient is working with occupational therapy this morning and seems to be doing well.  No new complaints at this time.  Right knee is more edematous and erythematous this morning and right lower extremity is generally more edematous today.  Afebrile, no diarrhea.  CRP is worsened to 8.38.  WBC remains low at 2.84.  CT right knee without contrast on 2022 showed a large joint effusion again.  Suprapatellar region collection appears slightly larger now measuring 10.3 x 3.3 cm in axial dimension and was 8.8 x 2.8 cm.  The collection again shows heterogenous mixed density.  Increasing lytic lucency at the lateral tibial plateau could represent osteomyelitis.  Blood cultures on 2022 finalized as no growth.    Review of Systems:    Constitutional: no fever, chills and night sweats.  Generalized fatigue.  Eyes: no eye drainage, itching or redness.  HEENT: no mouth sores, dysphagia or nose bleed.  Respiratory: no for shortness of breath, cough or production of sputum.  Cardiovascular: no chest pain, no palpitations, no orthopnea.  Gastrointestinal: no nausea, vomiting or diarrhea. No abdominal pain, hematemesis or rectal bleeding.  Genitourinary: no dysuria or polyuria.  Hematologic/lymphatic: no lymph node abnormalities, no easy bruising or easy bleeding.  Musculoskeletal: no muscle or joint pain.  Stiff right knee.  Chronic back spasms.  Skin: No rash and no itching.  Neurological: no loss of consciousness, no seizure, no headache.  Behavioral/Psych: no  depression or suicidal ideation.  Endocrine: no hot flashes.  Immunologic: negative.    ----------------------------------------------------------------------------------------------------------------------      Objective       [facility] Meds:  ascorbic acid, 500 mg, Oral, Daily  aspirin, 81 mg, Oral, Daily  DAPTOmycin, 6 mg/kg (Adjusted), Intravenous, Q24H  Diclofenac Sodium, 4 g, Topical, 4x Daily  docusate sodium, 100 mg, Oral, BID  fludrocortisone, 50 mcg, Oral, Daily  Insulin Aspart, 0-14 Units, Subcutaneous, TID AC  insulin detemir, 18 Units, Subcutaneous, Q12H  iron polysaccharides, 150 mg, Oral, Daily  levothyroxine, 25 mcg, Oral, Q AM  lidocaine PF 1%, 5 mL, Injection, Once  lidocaine PF 2%, 10 mL, Injection, Once  midodrine, 10 mg, Oral, TID AC  multivitamin with minerals, 1 tablet, Oral, Daily  pantoprazole, 40 mg, Oral, Q AM  polyethylene glycol, 17 g, Oral, Daily  pregabalin, 100 mg, Oral, Q12H      Pharmacy Consult,       ----------------------------------------------------------------------------------------------------------------------    Vital Signs:  Temp:  [98.8 °F (37.1 °C)] 98.8 °F (37.1 °C)  Heart Rate:  [81-87] 86  Resp:  [18] 18  BP: (118-154)/(57-72) 154/72  No data found.  SpO2 Percentage    11/17/22 0739 11/17/22 1900 11/18/22 0737   SpO2: 99% 96% 99%     SpO2:  [96 %-99 %] 99 %  on   ;   Device (Oxygen Therapy): room air    Body mass index is 28.34 kg/m².  Wt Readings from Last 3 Encounters:   11/01/22 89.6 kg (197 lb 8.5 oz)   09/23/22 98 kg (216 lb)   06/17/22 115 kg (254 lb)        Intake/Output Summary (Last 24 hours) at 11/18/2022 1000  Last data filed at 11/18/2022 0500  Gross per 24 hour   Intake 960 ml   Output 3525 ml   Net -2565 ml     Diet Regular Texture (IDDSI 7); Regular Consistency; Diabetic Diets; Consistent Carbohydrate  ----------------------------------------------------------------------------------------------------------------------      Physical  Exam:    Constitutional: Chronically ill-appearing male is working with occupational therapy this morning and is in no apparent distress.  HENT:  Head: Normocephalic and atraumatic.  Mouth:  Moist mucous membranes.    Cardiovascular:  Normal rate, regular rhythm and normal heart sounds with no murmur. No edema.  Pulmonary/Chest:  No respiratory distress, no wheezes, no crackles, with normal breath sounds and good air movement.  Abdominal:  Soft.  Bowel sounds are normal.  No distension and no tenderness.   Musculoskeletal:  No edema, no tenderness, and no deformity.  No swelling or redness of joints.  Neurological:  Alert and oriented to person, place, and time.  No facial droop.  No slurred speech.   Skin:  Skin is warm and dry.  No rash noted.  No pallor.  Right knee surgical incision with good healing and staples removed.  More edematous and erythematous this morning and right lower extremity is generally more edematous.  Psychiatric:  Normal mood and affect.  Behavior is normal.    ----------------------------------------------------------------------------------------------------------------------  Results from last 7 days   Lab Units 11/14/22 0226   CK TOTAL U/L 18*           Results from last 7 days   Lab Units 11/18/22 0300 11/17/22 0131 11/14/22 0226   CRP mg/dL 8.38* 6.73* 5.83*   WBC 10*3/mm3 2.84* 3.11* 2.42*   HEMOGLOBIN g/dL 9.1* 8.7* 8.6*   HEMATOCRIT % 29.4* 27.4* 28.0*   MCV fL 83.3 82.8 84.3   MCHC g/dL 31.0* 31.8 30.7*   PLATELETS 10*3/mm3 104* 109* 103*   INR  1.18*  --   --      Results from last 7 days   Lab Units 11/18/22 0300 11/17/22 0131 11/14/22  0226   SODIUM mmol/L 132* 133* 132*   POTASSIUM mmol/L 4.0 4.0 3.9   CHLORIDE mmol/L 99 100 98   CO2 mmol/L 24.5 25.4 25.7   BUN mg/dL 11 11 9   CREATININE mg/dL 0.71* 0.73* 0.68*   CALCIUM mg/dL 8.3* 8.1* 8.1*   GLUCOSE mg/dL 199* 184* 238*   Estimated Creatinine Clearance: 129.2 mL/min (A) (by C-G formula based on SCr of 0.71 mg/dL  (L)).  No results found for: AMMONIA    Glucose   Date/Time Value Ref Range Status   11/18/2022 0619 139 (H) 70 - 130 mg/dL Final     Comment:     Meter: [id number] : [id number] Susi Velez   11/17/2022 2049 279 (H) 70 - 130 mg/dL Final     Comment:     Meter: [id number] : [id number] YAMILET GAMA   11/17/2022 1601 305 (H) 70 - 130 mg/dL Final     Comment:     Meter: [id number] : [id number] lisa ramirez   11/17/2022 1105 182 (H) 70 - 130 mg/dL Final     Comment:     Meter: [id number] : [id number] lisa ramirez   11/17/2022 0632 100 70 - 130 mg/dL Final     Comment:     Meter: [id number] : [id number] YAMILET GAAM   11/16/2022 2028 267 (H) 70 - 130 mg/dL Final     Comment:     Meter: [id number] : [id number] YAMILET GAMA   11/16/2022 1605 242 (H) 70 - 130 mg/dL Final     Comment:     Meter: [id number] : [id number] mark torres   11/16/2022 1057 223 (H) 70 - 130 mg/dL Final     Comment:     Meter: [id number] : [id number] NADJA RANGEL     Lab Results   Component Value Date    HGBA1C 8.80 (H) 09/20/2022     Lab Results   Component Value Date    TSH 2.390 10/10/2022    FREET4 0.87 (L) 10/10/2022       Blood Culture   Date Value Ref Range Status   10/24/2022 No growth at 5 days  Final   10/24/2022 No growth at 5 days  Final     No results found for: URINECX  No results found for: WOUNDCX  No results found for: STOOLCX  No results found for: RESPCX  Pain Management Panel     Pain Management Panel Latest Ref Rng & Units 5/24/2022 5/11/2022    CREATININE UR mg/dL 91.0 -    AMPHETAMINES SCREEN, URINE Negative - Negative    BARBITURATES SCREEN Negative - Negative    BENZODIAZEPINE SCREEN, URINE Negative - Negative    BUPRENORPHINEUR Negative - Negative    COCAINE SCREEN, URINE Negative - Negative    METHADONE SCREEN, URINE Negative - Negative    METHAMPHETAMINEUR Negative - Negative             ----------------------------------------------------------------------------------------------------------------------  Imaging Results (Last 24 Hours)     Procedure Component Value Units Date/Time    CT knee right wo contrast [[id number]] Collected: 11/17/22 1313     Updated: 11/17/22 1318    Narrative:      EXAM:    CT Right Lower Extremity Without Intravenous Contrast, Knee     EXAM DATE:    11/17/2022 12:56 PM     CLINICAL HISTORY:    recent septic arthritis; mild erythema noted on lateral knee;  M00.061-Staphylococcal arthritis, right knee     TECHNIQUE:    Axial computed tomography images of the right knee without intravenous  contrast.  Sagittal and coronal reformatted images were created and  reviewed.  This CT exam was performed using one or more of the following  dose reduction techniques:  automated exposure control, adjustment of  the mA and/or kV according to patient size, and/or use of iterative  reconstruction technique.     COMPARISON:    10/13/2022     FINDINGS:      Large knee joint effusion again noted. Suprapatellar region  collection appears slightly larger now measuring 10.3 x 3.3 cm in axial  dimension and was 8.8 x 2.8 cm. The collection again shows heterogeneous  mixed density.  Increasing lytic lucency of the lateral tibial plateau  could represent osteomyelitis.  No acute fracture.  No dislocation.     Again soft tissue swelling is noted with anterior soft tissue  thickening.       Impression:      1.  Large knee joint effusion again noted. Suprapatellar region  collection appears slightly larger now measuring 10.3 x 3.3 cm in axial  dimension and was 8.8 x 2.8 cm. The collection again shows heterogeneous  mixed density.  2.  Increasing lytic lucency of the lateral tibial plateau could  represent osteomyelitis.     This report was finalized on 11/17/2022 1:15 PM by Dr. Oswaldo Brown MD.              ----------------------------------------------------------------------------------------------------------------------    Pertinent Infectious Disease Results    CPK on 11/6/2022 remains normal at 29.  COVID-19 PCR from 10/28/2022 negative.  Blood cultures from 10/18/2022 2 out of 2 sets positive for MRSA.  Blood cultures from 10/20/2022 2 out of 2 sets positive for MRSA.  Blood cultures from 10/22/2022 1 out of 2 sets positive for MRSA.  Blood cultures from 10/24/2022 finalized as no growth.  2D echo from 10/24/2022 reports no evidence of vegetation.  Updated TYRESE on 11/1/2022 showed a moderate sized echodense structure on the posterior tricuspid leaflet but not attached to the leaflet.  The appearance and location are not typical for regurgitation.  This structure could be a normal variant.  No evidence of tricuspid valve stenosis or significant regurgitations.  Other valves also appear normal in structure with no evidence of stenosis or significant regurgitations.  No vegetation seen on these valves.    Right lower extremity venous duplex negative for DVT.  Bilateral upper extremity venous duplex negative for DVT.       Assessment/Plan       Assessment     Right knee septic arthritis  MRSA bacteremia  Possible endocarditis    Plan      I have seen and examined the patient myself this morning and discussed the plan of care with Sara Sales PA-C and here are my findings:    Patient is currently working with occupational therapy this morning and seems to be comfortable in no acute distress and overall doing well but unfortunately his right knee continues to be more edematous and more swollen with some erythema noted as well.    I discussed the case with primary team Dr. Mike and went ahead and order a CT scan of the knee that showed a large joint effusion again noted with slightly larger collection now measuring 10.3 x 3.3 cm and previously was 8.8 x 2.8 cm.  The collection is heterogeneous and mixed  density possibly consistent with hematoma or abscess.  There is increasing lytic lucency at the lateral tibial plateau that could represent osteomyelitis.    I recommended based on CT scan finding and rising CRP level to proceed with collection aspiration and patient might definitely require a repeat joint washout with intraoperative cultures.  Overall very complex case and guarded prognosis and will continue with empiric IV antibiotic therapy and extend the course beyond 11/20/2022 and might have to delay TYRESE until orthopedic intervention is performed.  Overall very complicated case and I am very concerned about the rising CRP level which is consistent with the clinical worsening.      Code Status:   Code Status and Medical Interventions:   Ordered at: 11/03/22 1015     Level Of Support Discussed With:    Patient     Code Status (Patient has no pulse and is not breathing):    CPR (Attempt to Resuscitate)     Medical Interventions (Patient has pulse or is breathing):    Full Support     Release to patient:    Routine Release       Sara Sales PA-C  11/18/22  10:00 EST     Electronically signed by Sara Sales PA-C, 11/18/22, 10:06 AM EST.    Electronically signed by Marilynn Giordano MD, 11/18/22, 10:59 AM EST.

## 2022-11-18 NOTE — PROGRESS NOTES
[facility]  PROGRESS NOTE     Patient Identification:  Name:  Melchor Hardwick III  Age:  57 y.o.  Sex:  male  :  1965  MRN:  [id number]  Visit Number:  [id number]  ROOM: 109/2S     Primary Care Provider:  Missy Up APRN    Length of stay in inpatient status:  21    Subjective     Chief Compliant:  No chief complaint on file.      History of Presenting Illness: 57-year-old gentleman who had been treated for recent septic right knee arthritis, MRSA bacteremia and MRSA associated endocarditis.  Patient has been on IV daptomycin.  Patient is was noted to have some right swelling in the lower extremity yesterday and does have a knee effusion.  Because of septic car arthritis that was treated back in September there is some concern about recurrence.  However, patient states that he is not having severe pain with the right knee, there is been no fever.  In any event patient will have diagnostic knee aspiration today    Objective     Current Hospital Meds:ascorbic acid, 500 mg, Oral, Daily  aspirin, 81 mg, Oral, Daily  DAPTOmycin, 6 mg/kg (Adjusted), Intravenous, Q24H  Diclofenac Sodium, 4 g, Topical, 4x Daily  docusate sodium, 100 mg, Oral, BID  fludrocortisone, 50 mcg, Oral, Daily  Insulin Aspart, 0-14 Units, Subcutaneous, TID AC  insulin detemir, 18 Units, Subcutaneous, Q12H  iron polysaccharides, 150 mg, Oral, Daily  levothyroxine, 25 mcg, Oral, Q AM  lidocaine PF 1%, 5 mL, Injection, Once  lidocaine PF 2%, 10 mL, Injection, Once  midodrine, 10 mg, Oral, TID AC  multivitamin with minerals, 1 tablet, Oral, Daily  pantoprazole, 40 mg, Oral, Q AM  polyethylene glycol, 17 g, Oral, Daily  pregabalin, 100 mg, Oral, Q12H    Pharmacy Consult,       ----------------------------------------------------------------------------------------------------------------------  Vital Signs:  Temp:  [98.8 °F (37.1 °C)] 98.8 °F (37.1 °C)  Heart Rate:  [81-87] 86  Resp:  [18] 18  BP: (118-154)/(57-72)  154/72  SpO2:  [96 %-99 %] 99 %  on   ;   Device (Oxygen Therapy): room air  Body mass index is 28.34 kg/m².    Wt Readings from Last 3 Encounters:   11/01/22 89.6 kg (197 lb 8.5 oz)   09/23/22 98 kg (216 lb)   06/17/22 115 kg (254 lb)     Intake & Output (last 3 days)       11/15 0701 11/16 0700 11/16 0701 11/17 0700 11/17 0701 11/18 0700 11/18 0701  11/19 0700    P.O. 1320 1200 1320     Total Intake(mL/kg) 1320 (14.7) 1200 (13.4) 1320 (14.7)     Urine (mL/kg/hr) 1800 (0.8) 3050 (1.4) 3925 (1.8)     Stool 0       Total Output 1800 3050 3925     Net -480 -3308 -260             Urine Unmeasured Occurrence 2 x       Stool Unmeasured Occurrence 1 x           Diet Regular Texture (IDDSI 7); Regular Consistency; Diabetic Diets; Consistent Carbohydrate  ----------------------------------------------------------------------------------------------------------------------  Physical exam:  Constitutional:   No acute distress  HEENT: Normocephalic atraumatic  Neck: Supple   Cardiovascular: Regular rate and rhythm  Pulmonary/Chest: Clear to auscultation  Abdominal: Positive bowel sounds soft.   Musculoskeletal: Right knee no obvious erythema noted at this time.  Does have an effusion.  Left transmetatarsal amputation  Neurological: No focal deficits  Skin: No rash  Peripheral vascular:  Genitourinary:  ----------------------------------------------------------------------------------------------------------------------    Last echocardiogram:  Results for orders placed during the hospital encounter of 10/28/22    Adult Transesophageal Echo (TYRESE) W/ Cont if Necessary Per Protocol    Interpretation Summary  •  Indication for TYRESE -to rule out infective endocarditis in a patient with MRSA bacteremia.  •  Findings-  •  Left ventricular systolic function is normal. Estimated left ventricular EF = 60%  •  Left atrial appendage is well visualized and is free of thrombus.  •  Saline test was negative for the evidence of shunt in  interatrial septum.  •  The tricuspid valve appeared normal in structure. There was a moderate sized echodense structure seen above  the posterior tricuspid leaflet but not attached to the leaflet. The appearance and location are not typical for regurgitation. This structure could be a normal variant. No evidence of tricuspid valve stenosis or significant regurgitations.  •  Other valves also appear normal in structure with no evidence of stenosis or significant regurgitations.  No vegetations seen on these valves.  •  There is no evidence of pericardial effusion.  •  Comment-  •  In view of presence of MRSA bacteremia, recommend to extend antibiotic therapy for possible infective endocarditis and repeat TYRESE in 3 months.    ----------------------------------------------------------------------------------------------------------------------  Results from last 7 days   Lab Units 11/18/22 0300 11/17/22 0131 11/14/22 0226   CRP mg/dL 8.38* 6.73* 5.83*   WBC 10*3/mm3 2.84* 3.11* 2.42*   HEMOGLOBIN g/dL 9.1* 8.7* 8.6*   HEMATOCRIT % 29.4* 27.4* 28.0*   MCV fL 83.3 82.8 84.3   MCHC g/dL 31.0* 31.8 30.7*   PLATELETS 10*3/mm3 104* 109* 103*   INR  1.18*  --   --          Results from last 7 days   Lab Units 11/18/22 0300 11/17/22 0131 11/14/22 0226   SODIUM mmol/L 132* 133* 132*   POTASSIUM mmol/L 4.0 4.0 3.9   CHLORIDE mmol/L 99 100 98   CO2 mmol/L 24.5 25.4 25.7   BUN mg/dL 11 11 9   CREATININE mg/dL 0.71* 0.73* 0.68*   CALCIUM mg/dL 8.3* 8.1* 8.1*   GLUCOSE mg/dL 199* 184* 238*   Estimated Creatinine Clearance: 129.2 mL/min (A) (by C-G formula based on SCr of 0.71 mg/dL (L)).  No results found for: AMMONIA  Results from last 7 days   Lab Units 11/14/22  0226   CK TOTAL U/L 18*             Glucose   Date/Time Value Ref Range Status   11/18/2022 1101 224 (H) 70 - 130 mg/dL Final     Comment:     Meter: [id number] : [id number] mark [facility]   11/18/2022 0619 139 (H) 70 - 130 mg/dL Final     Comment:      Meter: [id number] : [id number] Susi Velez   11/17/2022 2049 279 (H) 70 - 130 mg/dL Final     Comment:     Meter: [id number] : [id number] YAMILET GAMA   11/17/2022 1601 305 (H) 70 - 130 mg/dL Final     Comment:     Meter: [id number] : [id number] lisa ramirez   11/17/2022 1105 182 (H) 70 - 130 mg/dL Final     Comment:     Meter: [id number] : [id number] lisa ramirez   11/17/2022 0632 100 70 - 130 mg/dL Final     Comment:     Meter: [id number] : [id number] YAMILET GAMA   11/16/2022 2028 267 (H) 70 - 130 mg/dL Final     Comment:     Meter: [id number] : [id number] YAMILET GAMA   11/16/2022 1605 242 (H) 70 - 130 mg/dL Final     Comment:     Meter: [id number] : [id number] mark torres     Lab Results   Component Value Date    TSH 2.390 10/10/2022    FREET4 0.87 (L) 10/10/2022     No results found for: PREGTESTUR, PREGSERUM, HCG, HCGQUANT  Pain Management Panel     Pain Management Panel Latest Ref Rng & Units 5/24/2022 5/11/2022    CREATININE UR mg/dL 91.0 -    AMPHETAMINES SCREEN, URINE Negative - Negative    BARBITURATES SCREEN Negative - Negative    BENZODIAZEPINE SCREEN, URINE Negative - Negative    BUPRENORPHINEUR Negative - Negative    COCAINE SCREEN, URINE Negative - Negative    METHADONE SCREEN, URINE Negative - Negative    METHAMPHETAMINEUR Negative - Negative        Brief Urine Lab Results  (Last result in the past 365 days)      Color   Clarity   Blood   Leuk Est   Nitrite   Protein   CREAT   Urine HCG        10/19/22 0746 Yellow   Clear   Negative   Negative   Negative   Negative               Blood Culture   Date Value Ref Range Status   11/12/2022 No growth at 5 days  Final   11/12/2022 No growth at 5 days  Final         No results found for: URINECX  No results found for: WOUNDCX  No results found for: STOOLCX  Results from last 7 days   Lab Units 11/18/22  0300 11/17/22  0131 11/14/22  0226 11/12/22  0139   CRP mg/dL 8.38* 6.73* 5.83* 9.69*       I have  personally looked at the labs and they are summarized above.  ----------------------------------------------------------------------------------------------------------------------  Detailed radiology reports for the last 24 hours:    Imaging Results (Last 24 Hours)     Procedure Component Value Units Date/Time    CT knee right wo contrast [[id number]] Collected: 11/17/22 1313     Updated: 11/17/22 1318    Narrative:      EXAM:    CT Right Lower Extremity Without Intravenous Contrast, Knee     EXAM DATE:    11/17/2022 12:56 PM     CLINICAL HISTORY:    recent septic arthritis; mild erythema noted on lateral knee;  M00.061-Staphylococcal arthritis, right knee     TECHNIQUE:    Axial computed tomography images of the right knee without intravenous  contrast.  Sagittal and coronal reformatted images were created and  reviewed.  This CT exam was performed using one or more of the following  dose reduction techniques:  automated exposure control, adjustment of  the mA and/or kV according to patient size, and/or use of iterative  reconstruction technique.     COMPARISON:    10/13/2022     FINDINGS:      Large knee joint effusion again noted. Suprapatellar region  collection appears slightly larger now measuring 10.3 x 3.3 cm in axial  dimension and was 8.8 x 2.8 cm. The collection again shows heterogeneous  mixed density.  Increasing lytic lucency of the lateral tibial plateau  could represent osteomyelitis.  No acute fracture.  No dislocation.     Again soft tissue swelling is noted with anterior soft tissue  thickening.       Impression:      1.  Large knee joint effusion again noted. Suprapatellar region  collection appears slightly larger now measuring 10.3 x 3.3 cm in axial  dimension and was 8.8 x 2.8 cm. The collection again shows heterogeneous  mixed density.  2.  Increasing lytic lucency of the lateral tibial plateau could  represent osteomyelitis.     This report was finalized on 11/17/2022 1:15 PM by Dr. Chacon  MD Stephanie.           Final impressions for the last 30 days of radiology reports:    Adult Transesophageal Echo (TYRESE) W/ Cont if Necessary Per Protocol    Addendum Date: 11/4/2022    •  Indication for TYRESE -to rule out infective endocarditis in a patient with MRSA bacteremia. •  Findings- •  Left ventricular systolic function is normal. Estimated left ventricular EF = 60% •  Left atrial appendage is well visualized and is free of thrombus. •  Saline test was negative for the evidence of shunt in interatrial septum. •  The tricuspid valve appeared normal in structure. There was a moderate sized echodense structure seen above  the posterior tricuspid leaflet but not attached to the leaflet. The appearance and location are not typical for regurgitation. This structure could be a normal variant. No evidence of tricuspid valve stenosis or significant regurgitations. •  Other valves also appear normal in structure with no evidence of stenosis or significant regurgitations.  No vegetations seen on these valves. •  There is no evidence of pericardial effusion. •  Comment- •  In view of presence of MRSA bacteremia, recommend to extend antibiotic therapy for possible infective endocarditis and repeat TYRESE in 3 months.     Addendum Date: 11/3/2022    •  Indication for TYRESE -to rule out infective endocarditis in a patient with MRSA bacteremia. •  Findings- •  Left ventricular systolic function is normal. Estimated left ventricular EF = 60% •  Left atrial appendage is well visualized and is free of thrombus. •  Saline test was negative for the evidence of shunt in interatrial septum. •  The tricuspid valve appeared normal in structure. There was a moderate sized echodense structure seen above  the posterior tricuspid leaflet but not attached to the leaflet. The appearance and location are not typical for regurgitation. This structure could be a normal variant. No evidence of tricuspid valve stenosis or significant regurgitations. •   Other valves also appear normal in structure with no evidence of stenosis or significant regurgitations.  No vegetations seen on these valves. •  There is no evidence of pericardial effusion. •  Comment- •  In view of presence of MRSA bacteremia, recommend to extend antibiotic therapy for infective endocarditis and repeat TYRESE in 3 months.     Addendum Date: 11/3/2022    •  Indication for TYRESE -to rule out infective endocarditis in a patient with MRSA bacteremia. •  Findings- •  Left ventricular systolic function is normal. Estimated left ventricular EF = 60% •  Left atrial appendage is well visualized and is free of thrombus. •  Saline test was negative for the evidence of shunt in interatrial septum. •  The tricuspid valve appeared normal in structure. There was a moderate sized echodense structure seen above  the posterior tricuspid leaflet but not attached to the leaflet. The appearance and location are not typical for regurgitation. This structure could be a normal variant. No evidence of tricuspid valve stenosis or significant regurgitations. •  Other valves also appear normal in structure with no evidence of stenosis or significant regurgitations.  No vegetations seen on these valves. •  There is no evidence of pericardial effusion.     Addendum Date: 11/3/2022    •  Indication for TYRESE -to rule out infective endocarditis in a patient with MRSA bacteremia. •  Findings- •  Left ventricular systolic function is normal. Estimated left ventricular EF = 60% •  Left atrial appendage is well visualized and is free of thrombus. •  Saline test was negative for the evidence of shunt in interatrial septum. •  The tricuspid valve appeared normal in structure. There was a moderate sized echodense structure seen in both the posterior tricuspid leaflet but not attached to the leaflet. The appearance and location are not typical for regurgitation. This structure could be a normal variant. No evidence of tricuspid valve  stenosis or significant regurgitations. •  Other valves also appear normal in structure with no evidence of stenosis or significant regurgitations.  No vegetations seen on these valves. •  There is no evidence of pericardial effusion.     CT knee right wo contrast    Result Date: 11/17/2022  1.  Large knee joint effusion again noted. Suprapatellar region collection appears slightly larger now measuring 10.3 x 3.3 cm in axial dimension and was 8.8 x 2.8 cm. The collection again shows heterogeneous mixed density. 2.  Increasing lytic lucency of the lateral tibial plateau could represent osteomyelitis.  This report was finalized on 11/17/2022 1:15 PM by Dr. Oswaldo Brown MD.      XR Chest 1 View    Result Date: 10/18/2022  Probable airspace disease in the right lung with low lung volumes. Right PICC line at the level of diaphragm.  Signer Name: Yaquelin Ferreira MD  Signed: 10/18/2022 9:00 PM  Workstation Name: [facility]  [facility] Venous Doppler Lower Extremity Right (duplex)    Result Date: 11/12/2022  No deep venous thrombus in the right lower extremity. Signer Name: Thomas Day MD  Signed: 11/12/2022 11:34 AM  Workstation Name: [email or web address]  [facility] Venous Doppler Upper Extremity Bilateral (duplex)    Result Date: 11/12/2022  Negative examination.  No evidence of bilateral upper extremity venous thrombosis. Signer Name: Thomas Day MD  Signed: 11/12/2022 11:33 AM  Workstation Name: [facility]  [facility]    I have personally looked at the radiology images and read the final radiology report.    Assessment & Plan    Right septic knee--patient has been getting daptomycin.  Did have washout of the knee on September 21 while at [facility] in [place].  Patient does have a knee effusion that we will aspirate today and obtain further work-up.  Patient currently getting IV daptomycin.  CRP level is trending upward.    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  Contact-guard for sit to stand and stand to sit transfers.  Ambulated 80 feet 90 feet in the morning with front wheel walker and supervision.  Patient requiring set up for upper body dressing; set up for lower body dressing; set up for grooming; contact-guard to minimum assist for toileting.    Diabetes mellitus slightly suboptimal control    Lumbago improved    Pancytopenia likely secondary to cirrhosis    Orthostatic hypotension midodrine and Florinef.  Improved        VTE Prophylaxis:   Mechanical Order History:      Ordered        11/17/22 1442  Place Sequential Compression Device  Once            11/17/22 1442  Maintain Sequential Compression Device  Continuous                    Pharmalogical Order History:      Ordered     Dose Route Frequency Stop    10/28/22 1742  fondaparinux (ARIXTRA) injection 2.5 mg  Status:  Discontinued         2.5 mg SC Every 24 Hours Scheduled 11/17/22 1442                    Joe Mike MD  [facility]  11/18/22  11:28 EST

## 2022-11-19 LAB
GLUCOSE BLDC GLUCOMTR-MCNC: 121 MG/DL (ref 70–130)
GLUCOSE BLDC GLUCOMTR-MCNC: 220 MG/DL (ref 70–130)
GLUCOSE BLDC GLUCOMTR-MCNC: 237 MG/DL (ref 70–130)

## 2022-11-19 PROCEDURE — 99232 SBSQ HOSP IP/OBS MODERATE 35: CPT | Performed by: PHYSICIAN ASSISTANT

## 2022-11-19 PROCEDURE — 63710000001 INSULIN ASPART PER 5 UNITS: Performed by: INTERNAL MEDICINE

## 2022-11-19 PROCEDURE — 25010000002 DAPTOMYCIN PER 1 MG: Performed by: INTERNAL MEDICINE

## 2022-11-19 PROCEDURE — 99231 SBSQ HOSP IP/OBS SF/LOW 25: CPT | Performed by: FAMILY MEDICINE

## 2022-11-19 PROCEDURE — 82962 GLUCOSE BLOOD TEST: CPT

## 2022-11-19 PROCEDURE — 63710000001 INSULIN DETEMIR PER 5 UNITS: Performed by: INTERNAL MEDICINE

## 2022-11-19 PROCEDURE — 25010000002 FONDAPARINUX PER 0.5 MG: Performed by: FAMILY MEDICINE

## 2022-11-19 RX ADMIN — INSULIN DETEMIR 18 UNITS: 100 INJECTION, SOLUTION SUBCUTANEOUS at 08:59

## 2022-11-19 RX ADMIN — CARBIDOPA AND LEVODOPA 10 MG: 50; 200 TABLET, EXTENDED RELEASE ORAL at 09:00

## 2022-11-19 RX ADMIN — ASPIRIN 81 MG: 81 TABLET, COATED ORAL at 09:01

## 2022-11-19 RX ADMIN — PANTOPRAZOLE SODIUM 40 MG: 40 TABLET, DELAYED RELEASE ORAL at 05:50

## 2022-11-19 RX ADMIN — INSULIN ASPART 5 UNITS: 100 INJECTION, SOLUTION INTRAVENOUS; SUBCUTANEOUS at 17:54

## 2022-11-19 RX ADMIN — CYCLOBENZAPRINE 5 MG: 10 TABLET, FILM COATED ORAL at 09:06

## 2022-11-19 RX ADMIN — DOCUSATE SODIUM 100 MG: 100 CAPSULE ORAL at 09:01

## 2022-11-19 RX ADMIN — FONDAPARINUX SODIUM 2.5 MG: 2.5 INJECTION, SOLUTION SUBCUTANEOUS at 09:07

## 2022-11-19 RX ADMIN — POLYETHYLENE GLYCOL (3350) 17 G: 17 POWDER, FOR SOLUTION ORAL at 09:01

## 2022-11-19 RX ADMIN — HYDROCODONE BITARTRATE AND ACETAMINOPHEN 1 TABLET: 5; 325 TABLET ORAL at 20:17

## 2022-11-19 RX ADMIN — FLUDROCORTISONE ACETATE 50 MCG: 0.1 TABLET ORAL at 11:39

## 2022-11-19 RX ADMIN — CARBIDOPA AND LEVODOPA 10 MG: 50; 200 TABLET, EXTENDED RELEASE ORAL at 11:40

## 2022-11-19 RX ADMIN — Medication 150 MG: at 09:01

## 2022-11-19 RX ADMIN — DICLOFENAC 4 G: 10 GEL TOPICAL at 17:55

## 2022-11-19 RX ADMIN — CARBIDOPA AND LEVODOPA 10 MG: 50; 200 TABLET, EXTENDED RELEASE ORAL at 17:54

## 2022-11-19 RX ADMIN — DICLOFENAC 4 G: 10 GEL TOPICAL at 11:43

## 2022-11-19 RX ADMIN — DICLOFENAC 4 G: 10 GEL TOPICAL at 09:04

## 2022-11-19 RX ADMIN — Medication 1 TABLET: at 09:01

## 2022-11-19 RX ADMIN — LEVOTHYROXINE SODIUM 25 MCG: 0.07 TABLET ORAL at 05:50

## 2022-11-19 RX ADMIN — PREGABALIN 100 MG: 100 CAPSULE ORAL at 09:09

## 2022-11-19 RX ADMIN — DOCUSATE SODIUM 100 MG: 100 CAPSULE ORAL at 20:17

## 2022-11-19 RX ADMIN — DICLOFENAC 4 G: 10 GEL TOPICAL at 20:17

## 2022-11-19 RX ADMIN — INSULIN ASPART 5 UNITS: 100 INJECTION, SOLUTION INTRAVENOUS; SUBCUTANEOUS at 11:41

## 2022-11-19 RX ADMIN — VITAMINS A AND D OINTMENT 1 APPLICATION: 15.5; 53.4 OINTMENT TOPICAL at 20:17

## 2022-11-19 RX ADMIN — OXYCODONE HYDROCHLORIDE AND ACETAMINOPHEN 500 MG: 500 TABLET ORAL at 09:01

## 2022-11-19 RX ADMIN — INSULIN DETEMIR 18 UNITS: 100 INJECTION, SOLUTION SUBCUTANEOUS at 20:17

## 2022-11-19 RX ADMIN — PREGABALIN 100 MG: 100 CAPSULE ORAL at 20:17

## 2022-11-19 RX ADMIN — DAPTOMYCIN 500 MG: 500 INJECTION, POWDER, LYOPHILIZED, FOR SOLUTION INTRAVENOUS at 18:02

## 2022-11-19 NOTE — PROGRESS NOTES
PROGRESS NOTE         Patient Identification:  Name:  Melchor Hardwick III  Age:  57 y.o.  Sex:  male  :  1965  MRN:  [id number]  Visit Number:  [id number]  Primary Care Provider:  Missy Up APRN         LOS: 22 days       ----------------------------------------------------------------------------------------------------------------------  Subjective       Chief Complaints:    No chief complaint on file.        Interval History:      The patient is sitting up in bed on room air in no apparent distress.  Continues to complain of right knee stiffness with mild pain after aspiration attempts.  Right knee with edema and mild erythema.  Edema extends down into the calf and shin.  Afebrile, no diarrhea.  No new labs today.    Review of Systems:    Constitutional: no fever, chills and night sweats.  Generalized fatigue.  Eyes: no eye drainage, itching or redness.  HEENT: no mouth sores, dysphagia or nose bleed.  Respiratory: no for shortness of breath, cough or production of sputum.  Cardiovascular: no chest pain, no palpitations, no orthopnea.  Gastrointestinal: no nausea, vomiting or diarrhea. No abdominal pain, hematemesis or rectal bleeding.  Genitourinary: no dysuria or polyuria.  Hematologic/lymphatic: no lymph node abnormalities, no easy bruising or easy bleeding.  Musculoskeletal: no muscle or joint pain.  Right knee stiffness and pain.  Chronic back spasms.  Skin: No rash and no itching.  Neurological: no loss of consciousness, no seizure, no headache.  Behavioral/Psych: no depression or suicidal ideation.  Endocrine: no hot flashes.  Immunologic: negative.    ----------------------------------------------------------------------------------------------------------------------      Objective       [facility] Meds:  ascorbic acid, 500 mg, Oral, Daily  aspirin, 81 mg, Oral, Daily  DAPTOmycin, 6 mg/kg (Adjusted), Intravenous, Q24H  Diclofenac Sodium, 4 g, Topical, 4x Daily  docusate  sodium, 100 mg, Oral, BID  fludrocortisone, 50 mcg, Oral, Daily  fondaparinux, 2.5 mg, Subcutaneous, Q24H  Insulin Aspart, 0-14 Units, Subcutaneous, TID AC  insulin detemir, 18 Units, Subcutaneous, Q12H  iron polysaccharides, 150 mg, Oral, Daily  levothyroxine, 25 mcg, Oral, Q AM  lidocaine PF 2%, 10 mL, Injection, Once  midodrine, 10 mg, Oral, TID AC  multivitamin with minerals, 1 tablet, Oral, Daily  pantoprazole, 40 mg, Oral, Q AM  polyethylene glycol, 17 g, Oral, Daily  pregabalin, 100 mg, Oral, Q12H      Pharmacy Consult,       ----------------------------------------------------------------------------------------------------------------------    Vital Signs:  Temp:  [98 °F (36.7 °C)-98.8 °F (37.1 °C)] 98 °F (36.7 °C)  Heart Rate:  [80-97] 80  Resp:  [18-20] 20  BP: (118-135)/(61-71) 118/71  No data found.  SpO2 Percentage    11/17/22 1900 11/18/22 0737 11/18/22 1900   SpO2: 96% 99% 97%     SpO2:  [97 %] 97 %  on   ;   Device (Oxygen Therapy): room air    Body mass index is 28.34 kg/m².  Wt Readings from Last 3 Encounters:   11/01/22 89.6 kg (197 lb 8.5 oz)   09/23/22 98 kg (216 lb)   06/17/22 115 kg (254 lb)        Intake/Output Summary (Last 24 hours) at 11/19/2022 0902  Last data filed at 11/19/2022 0800  Gross per 24 hour   Intake 1800 ml   Output 3800 ml   Net -2000 ml     Diet Regular Texture (IDDSI 7); Regular Consistency; Diabetic Diets; Consistent Carbohydrate  ----------------------------------------------------------------------------------------------------------------------      Physical Exam:    Constitutional: Chronically ill-appearing male is working with occupational therapy this morning and is in no apparent distress.  HENT:  Head: Normocephalic and atraumatic.  Mouth:  Moist mucous membranes.    Cardiovascular:  Normal rate, regular rhythm and normal heart sounds with no murmur. No edema.  Pulmonary/Chest:  No respiratory distress, no wheezes, no crackles, with normal breath sounds and good  air movement.  Abdominal:  Soft.  Bowel sounds are normal.  No distension and no tenderness.   Musculoskeletal:  No edema, no tenderness, and no deformity.  No swelling or redness of joints.  Neurological:  Alert and oriented to person, place, and time.  No facial droop.  No slurred speech.   Skin:  Skin is warm and dry.  No rash noted.  No pallor.  Right knee surgical incision with good healing and staples removed.  More edematous and erythematous this morning and right lower extremity is generally more edematous.  Psychiatric:  Normal mood and affect.  Behavior is normal.    ----------------------------------------------------------------------------------------------------------------------  Results from last 7 days   Lab Units 11/14/22 0226   CK TOTAL U/L 18*           Results from last 7 days   Lab Units 11/18/22 0300 11/17/22 0131 11/14/22 0226   CRP mg/dL 8.38* 6.73* 5.83*   WBC 10*3/mm3 2.84* 3.11* 2.42*   HEMOGLOBIN g/dL 9.1* 8.7* 8.6*   HEMATOCRIT % 29.4* 27.4* 28.0*   MCV fL 83.3 82.8 84.3   MCHC g/dL 31.0* 31.8 30.7*   PLATELETS 10*3/mm3 104* 109* 103*   INR  1.18*  --   --      Results from last 7 days   Lab Units 11/18/22 0300 11/17/22 0131 11/14/22 0226   SODIUM mmol/L 132* 133* 132*   POTASSIUM mmol/L 4.0 4.0 3.9   CHLORIDE mmol/L 99 100 98   CO2 mmol/L 24.5 25.4 25.7   BUN mg/dL 11 11 9   CREATININE mg/dL 0.71* 0.73* 0.68*   CALCIUM mg/dL 8.3* 8.1* 8.1*   GLUCOSE mg/dL 199* 184* 238*   Estimated Creatinine Clearance: 129.2 mL/min (A) (by C-G formula based on SCr of 0.71 mg/dL (L)).  No results found for: AMMONIA    Glucose   Date/Time Value Ref Range Status   11/19/2022 0621 121 70 - 130 mg/dL Final     Comment:     Meter: [id number] : [id number] Jameson Crystal   11/18/2022 1558 251 (H) 70 - 130 mg/dL Final     Comment:     Meter: [id number] : [id number] lisa ashley   11/18/2022 1101 224 (H) 70 - 130 mg/dL Final     Comment:     Meter: [id number] : [id number] mark torres    11/18/2022 0619 139 (H) 70 - 130 mg/dL Final     Comment:     Meter: [id number] : [id number] Susi Velez   11/17/2022 2049 279 (H) 70 - 130 mg/dL Final     Comment:     Meter: [id number] : [id number] YAMILET GAMA   11/17/2022 1601 305 (H) 70 - 130 mg/dL Final     Comment:     Meter: [id number] : [id number] lisa ramirez   11/17/2022 1105 182 (H) 70 - 130 mg/dL Final     Comment:     Meter: [id number] : [id number] lisa ashley   11/17/2022 0632 100 70 - 130 mg/dL Final     Comment:     Meter: [id number] : [id number] YAMILET GAMA     Lab Results   Component Value Date    HGBA1C 8.80 (H) 09/20/2022     Lab Results   Component Value Date    TSH 2.390 10/10/2022    FREET4 0.87 (L) 10/10/2022       Blood Culture   Date Value Ref Range Status   10/24/2022 No growth at 5 days  Final   10/24/2022 No growth at 5 days  Final     No results found for: URINECX  No results found for: WOUNDCX  No results found for: STOOLCX  No results found for: RESPCX  Pain Management Panel       Pain Management Panel Latest Ref Rng & Units 5/24/2022 5/11/2022    CREATININE UR mg/dL 91.0 -    AMPHETAMINES SCREEN, URINE Negative - Negative    BARBITURATES SCREEN Negative - Negative    BENZODIAZEPINE SCREEN, URINE Negative - Negative    BUPRENORPHINEUR Negative - Negative    COCAINE SCREEN, URINE Negative - Negative    METHADONE SCREEN, URINE Negative - Negative    METHAMPHETAMINEUR Negative - Negative              ----------------------------------------------------------------------------------------------------------------------  Imaging Results (Last 24 Hours)       ** No results found for the last 24 hours. **            ----------------------------------------------------------------------------------------------------------------------    Pertinent Infectious Disease Results     COVID-19 PCR from 10/28/2022 negative.  Blood cultures from 10/18/2022 2 out of 2 sets positive for MRSA.  Blood cultures from  10/20/2022 2 out of 2 sets positive for MRSA.  Blood cultures from 10/22/2022 1 out of 2 sets positive for MRSA.  Blood cultures from 10/24/2022 finalized as no growth.  2D echo from 10/24/2022 reports no evidence of vegetation.  Updated TYRESE on 11/1/2022 showed a moderate sized echodense structure on the posterior tricuspid leaflet but not attached to the leaflet.  The appearance and location are not typical for regurgitation.  This structure could be a normal variant.  No evidence of tricuspid valve stenosis or significant regurgitations.  Other valves also appear normal in structure with no evidence of stenosis or significant regurgitations.  No vegetation seen on these valves. Right lower extremity venous duplex negative for DVT.  Bilateral upper extremity venous duplex negative for DVT.     CT right knee without contrast on 11/17/2022 showed a large joint effusion again.  Suprapatellar region collection appears slightly larger now measuring 10.3 x 3.3 cm in axial dimension and was 8.8 x 2.8 cm.  The collection again shows heterogenous mixed density.  Increasing lytic lucency at the lateral tibial plateau could represent osteomyelitis.  Blood cultures on 11/12/2022 finalized as no growth.    Assessment/Plan       Assessment     Right knee septic arthritis  MRSA bacteremia  Possible endocarditis    Plan      I examined the patient this morning and he is sitting up in bed on room air in no apparent distress.  Dr. Lin attempted an aspiration of the right knee yesterday without any luck and patient is complaining of some pain this morning.  Right knee is edematous and mildly erythematous with edema extending down into the calf and shin.  Lungs are clear to auscultation bilaterally.  Remains afebrile without diarrhea.  No new labs today.    Dr. Lin discussed the case with Dr. Russell and Dr. Bentley.  Dr. Russell felt it is in the patient's best interest to follow-up with initial surgeon of record.  Dr. Bentley  recommended to continue IV antibiotics and he will see the patient in the office soon.  Primary team are in the process of arranging an appointment hopefully early next week.  At that time, Dr. Bentley plans to aspirate the knee and consider further treatment options.  He stated that patient may require a spacer in the knee.    For now, patient remains on daptomycin and TYRESE may have to be delayed until orthopedic intervention is performed.  Overall, this is a very complicated case.    Code Status:   Code Status and Medical Interventions:   Ordered at: 11/03/22 1015     Level Of Support Discussed With:    Patient     Code Status (Patient has no pulse and is not breathing):    CPR (Attempt to Resuscitate)     Medical Interventions (Patient has pulse or is breathing):    Full Support     Release to patient:    Routine Release       Sara Sales PA-C  11/19/22  09:02 EST

## 2022-11-19 NOTE — PROGRESS NOTES
Rehabilitation Nursing  Inpatient Rehabilitation Plan of Care Note    Plan of Care  Copy from POC    Pain    Pain Management (Active)  Current Status (10/28/2022 5:07:00 PM): potential for increased pain  Weekly Goal: pain at a tolerable level  Discharge Goal: no pain    Body Function Structure    Skin Integrity (Active)  Current Status (10/28/2022 5:02:00 PM): impaired skin integrity  Weekly Goal: improved skin integrity  Discharge Goal: wound healing    Safety    Potential for Injury (Active)  Current Status (10/28/2022 5:07:00 PM): potential for falls  Weekly Goal: no falls  Discharge Goal: no falls    Signed by: Carlie Sales Nurse

## 2022-11-19 NOTE — PLAN OF CARE
Problem: Rehabilitation ([facility]) Plan of Care  Goal: Plan of Care Review  Outcome: Ongoing, Progressing  Flowsheets (Taken 11/19/2022 1221)  Progress: improving  Plan of Care Reviewed With: patient  Goal: Patient-Specific Goal (Individualized)  Outcome: Ongoing, Progressing  Goal: Absence of New-Onset Illness or Injury  Outcome: Ongoing, Progressing  Goal: Optimal Comfort and Wellbeing  Outcome: Ongoing, Progressing  Goal: Home and Community Transition Plan Established  Outcome: Ongoing, Progressing     Problem: Fall Injury Risk  Goal: Absence of Fall and Fall-Related Injury  Outcome: Ongoing, Progressing     Problem: COPD (Chronic Obstructive Pulmonary Disease) Comorbidity  Goal: Maintenance of COPD Symptom Control  Outcome: Ongoing, Progressing     Problem: Diabetes Comorbidity  Goal: Blood Glucose Level Within Targeted Range  Outcome: Ongoing, Progressing     Problem: Pain Chronic (Persistent) (Comorbidity Management)  Goal: Acceptable Pain Control and Functional Ability  Outcome: Ongoing, Progressing     Problem: Skin Injury Risk Increased  Goal: Skin Health and Integrity  Outcome: Ongoing, Progressing   Goal Outcome Evaluation:  Plan of Care Reviewed With: patient        Progress: improving

## 2022-11-19 NOTE — PROGRESS NOTES
[facility]  PROGRESS NOTE     Patient Identification:  Name:  Melchor Hardwcik III  Age:  57 y.o.  Sex:  male  :  1965  MRN:  [id number]  Visit Number:  [id number]  ROOM: 109/2S     Primary Care Provider:  Missy Up APRN    Length of stay in inpatient status:  22    Subjective     Chief Compliant:  No chief complaint on file.      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  Patient has had recurrent knee effusion and has findings on CT consistent with possible or likely osteomyelitis involving the tibial plateau.  Patient states that there is soreness in the knee at this time and stiffness in the knee but has no other acute complaints.  Patient has no other complaints at all at this time.  Is somewhat discouraged because of this recent turn of events.    Objective     Current Hospital Meds:ascorbic acid, 500 mg, Oral, Daily  aspirin, 81 mg, Oral, Daily  DAPTOmycin, 6 mg/kg (Adjusted), Intravenous, Q24H  Diclofenac Sodium, 4 g, Topical, 4x Daily  docusate sodium, 100 mg, Oral, BID  fludrocortisone, 50 mcg, Oral, Daily  fondaparinux, 2.5 mg, Subcutaneous, Q24H  Insulin Aspart, 0-14 Units, Subcutaneous, TID AC  insulin detemir, 18 Units, Subcutaneous, Q12H  iron polysaccharides, 150 mg, Oral, Daily  levothyroxine, 25 mcg, Oral, Q AM  lidocaine PF 2%, 10 mL, Injection, Once  midodrine, 10 mg, Oral, TID AC  multivitamin with minerals, 1 tablet, Oral, Daily  pantoprazole, 40 mg, Oral, Q AM  polyethylene glycol, 17 g, Oral, Daily  pregabalin, 100 mg, Oral, Q12H    Pharmacy Consult,       ----------------------------------------------------------------------------------------------------------------------  Vital Signs:  Temp:  [98 °F (36.7 °C)-98.8 °F (37.1 °C)] 98 °F (36.7 °C)  Heart Rate:  [80-97] 80  Resp:  [18-20] 20  BP: (118-135)/(61-71) 118/71  SpO2:  [97 %] 97 %  on   ;   Device (Oxygen Therapy): room air  Body mass  index is 28.34 kg/m².    Wt Readings from Last 3 Encounters:   11/01/22 89.6 kg (197 lb 8.5 oz)   09/23/22 98 kg (216 lb)   06/17/22 115 kg (254 lb)     Intake & Output (last 3 days)       11/16 0701  11/17 0700 11/17 0701 11/18 0700 11/18 0701  11/19 0700 11/19 0701  11/20 0700    P.O. 1200 1320 1800 360    Total Intake(mL/kg) 1200 (13.4) 1320 (14.7) 1800 (20.1) 360 (4)    Urine (mL/kg/hr) 3050 (1.4) 3925 (1.8) 3250 (1.5) 950 (2.9)    Stool        Total Output 3050 3925 3250 950    Net -6361 -2515 -1709 -817            Urine Unmeasured Occurrence   1 x         Diet Regular Texture (IDDSI 7); Regular Consistency; Diabetic Diets; Consistent Carbohydrate  ----------------------------------------------------------------------------------------------------------------------  Physical exam:  Constitutional:   No acute distress  HEENT: Normocephalic atraumatic  Neck: Supple   Cardiovascular: Regular rate and rhythm  Pulmonary/Chest: Clear to auscultation  Abdominal: Positive bowel sounds soft.   Musculoskeletal: Right knee patient does have an effusion at this time no erythema noted.  History of left transtarsal amputation  Neurological: No focal deficits  Skin: No rash  Peripheral vascular:  Genitourinary:  ----------------------------------------------------------------------------------------------------------------------    Last echocardiogram:  Results for orders placed during the hospital encounter of 10/28/22    Adult Transesophageal Echo (TYRESE) W/ Cont if Necessary Per Protocol    Interpretation Summary  •  Indication for TYRESE -to rule out infective endocarditis in a patient with MRSA bacteremia.  •  Findings-  •  Left ventricular systolic function is normal. Estimated left ventricular EF = 60%  •  Left atrial appendage is well visualized and is free of thrombus.  •  Saline test was negative for the evidence of shunt in interatrial septum.  •  The tricuspid valve appeared normal in structure. There was a moderate  sized echodense structure seen above  the posterior tricuspid leaflet but not attached to the leaflet. The appearance and location are not typical for regurgitation. This structure could be a normal variant. No evidence of tricuspid valve stenosis or significant regurgitations.  •  Other valves also appear normal in structure with no evidence of stenosis or significant regurgitations.  No vegetations seen on these valves.  •  There is no evidence of pericardial effusion.  •  Comment-  •  In view of presence of MRSA bacteremia, recommend to extend antibiotic therapy for possible infective endocarditis and repeat TYRESE in 3 months.    ----------------------------------------------------------------------------------------------------------------------  Results from last 7 days   Lab Units 11/18/22 0300 11/17/22 0131 11/14/22 0226   CRP mg/dL 8.38* 6.73* 5.83*   WBC 10*3/mm3 2.84* 3.11* 2.42*   HEMOGLOBIN g/dL 9.1* 8.7* 8.6*   HEMATOCRIT % 29.4* 27.4* 28.0*   MCV fL 83.3 82.8 84.3   MCHC g/dL 31.0* 31.8 30.7*   PLATELETS 10*3/mm3 104* 109* 103*   INR  1.18*  --   --          Results from last 7 days   Lab Units 11/18/22 0300 11/17/22 0131 11/14/22 0226   SODIUM mmol/L 132* 133* 132*   POTASSIUM mmol/L 4.0 4.0 3.9   CHLORIDE mmol/L 99 100 98   CO2 mmol/L 24.5 25.4 25.7   BUN mg/dL 11 11 9   CREATININE mg/dL 0.71* 0.73* 0.68*   CALCIUM mg/dL 8.3* 8.1* 8.1*   GLUCOSE mg/dL 199* 184* 238*   Estimated Creatinine Clearance: 129.2 mL/min (A) (by C-G formula based on SCr of 0.71 mg/dL (L)).  No results found for: AMMONIA  Results from last 7 days   Lab Units 11/14/22  0226   CK TOTAL U/L 18*             Glucose   Date/Time Value Ref Range Status   11/19/2022 0621 121 70 - 130 mg/dL Final     Comment:     Meter: [id number] : [id number] Tracey Crystal   11/18/2022 1558 251 (H) 70 - 130 mg/dL Final     Comment:     Meter: [id number] : [id number] lisa ashley   11/18/2022 1101 224 (H) 70 - 130 mg/dL Final     Comment:      Meter: [id number] : [id number] mark torres   11/18/2022 0619 139 (H) 70 - 130 mg/dL Final     Comment:     Meter: [id number] : [id number] Susi Velez   11/17/2022 2049 279 (H) 70 - 130 mg/dL Final     Comment:     Meter: [id number] : [id number] YAMILET GAMA   11/17/2022 1601 305 (H) 70 - 130 mg/dL Final     Comment:     Meter: [id number] : [id number] lisa ramirez   11/17/2022 1105 182 (H) 70 - 130 mg/dL Final     Comment:     Meter: [id number] : [id number] lisa ramirez   11/17/2022 0632 100 70 - 130 mg/dL Final     Comment:     Meter: [id number] : [id number] YAMILET GAMA     Lab Results   Component Value Date    TSH 2.390 10/10/2022    FREET4 0.87 (L) 10/10/2022     No results found for: PREGTESTUR, PREGSERUM, HCG, HCGQUANT  Pain Management Panel     Pain Management Panel Latest Ref Rng & Units 5/24/2022 5/11/2022    CREATININE UR mg/dL 91.0 -    AMPHETAMINES SCREEN, URINE Negative - Negative    BARBITURATES SCREEN Negative - Negative    BENZODIAZEPINE SCREEN, URINE Negative - Negative    BUPRENORPHINEUR Negative - Negative    COCAINE SCREEN, URINE Negative - Negative    METHADONE SCREEN, URINE Negative - Negative    METHAMPHETAMINEUR Negative - Negative        Brief Urine Lab Results  (Last result in the past 365 days)      Color   Clarity   Blood   Leuk Est   Nitrite   Protein   CREAT   Urine HCG        10/19/22 0746 Yellow   Clear   Negative   Negative   Negative   Negative               Blood Culture   Date Value Ref Range Status   11/12/2022 No growth at 5 days  Final   11/12/2022 No growth at 5 days  Final         No results found for: URINECX  No results found for: WOUNDCX  No results found for: STOOLCX  Results from last 7 days   Lab Units 11/18/22  0300 11/17/22  0131 11/14/22  0226   CRP mg/dL 8.38* 6.73* 5.83*       I have personally looked at the labs and they are summarized  above.  ----------------------------------------------------------------------------------------------------------------------  Detailed radiology reports for the last 24 hours:    Imaging Results (Last 24 Hours)     ** No results found for the last 24 hours. **        Final impressions for the last 30 days of radiology reports:    Adult Transesophageal Echo (TYRESE) W/ Cont if Necessary Per Protocol    Addendum Date: 11/4/2022    •  Indication for TYRESE -to rule out infective endocarditis in a patient with MRSA bacteremia. •  Findings- •  Left ventricular systolic function is normal. Estimated left ventricular EF = 60% •  Left atrial appendage is well visualized and is free of thrombus. •  Saline test was negative for the evidence of shunt in interatrial septum. •  The tricuspid valve appeared normal in structure. There was a moderate sized echodense structure seen above  the posterior tricuspid leaflet but not attached to the leaflet. The appearance and location are not typical for regurgitation. This structure could be a normal variant. No evidence of tricuspid valve stenosis or significant regurgitations. •  Other valves also appear normal in structure with no evidence of stenosis or significant regurgitations.  No vegetations seen on these valves. •  There is no evidence of pericardial effusion. •  Comment- •  In view of presence of MRSA bacteremia, recommend to extend antibiotic therapy for possible infective endocarditis and repeat TYRESE in 3 months.     Addendum Date: 11/3/2022    •  Indication for TYRESE -to rule out infective endocarditis in a patient with MRSA bacteremia. •  Findings- •  Left ventricular systolic function is normal. Estimated left ventricular EF = 60% •  Left atrial appendage is well visualized and is free of thrombus. •  Saline test was negative for the evidence of shunt in interatrial septum. •  The tricuspid valve appeared normal in structure. There was a moderate sized echodense structure seen  above  the posterior tricuspid leaflet but not attached to the leaflet. The appearance and location are not typical for regurgitation. This structure could be a normal variant. No evidence of tricuspid valve stenosis or significant regurgitations. •  Other valves also appear normal in structure with no evidence of stenosis or significant regurgitations.  No vegetations seen on these valves. •  There is no evidence of pericardial effusion. •  Comment- •  In view of presence of MRSA bacteremia, recommend to extend antibiotic therapy for infective endocarditis and repeat TYRESE in 3 months.     Addendum Date: 11/3/2022    •  Indication for TYRESE -to rule out infective endocarditis in a patient with MRSA bacteremia. •  Findings- •  Left ventricular systolic function is normal. Estimated left ventricular EF = 60% •  Left atrial appendage is well visualized and is free of thrombus. •  Saline test was negative for the evidence of shunt in interatrial septum. •  The tricuspid valve appeared normal in structure. There was a moderate sized echodense structure seen above  the posterior tricuspid leaflet but not attached to the leaflet. The appearance and location are not typical for regurgitation. This structure could be a normal variant. No evidence of tricuspid valve stenosis or significant regurgitations. •  Other valves also appear normal in structure with no evidence of stenosis or significant regurgitations.  No vegetations seen on these valves. •  There is no evidence of pericardial effusion.     Addendum Date: 11/3/2022    •  Indication for TYRESE -to rule out infective endocarditis in a patient with MRSA bacteremia. •  Findings- •  Left ventricular systolic function is normal. Estimated left ventricular EF = 60% •  Left atrial appendage is well visualized and is free of thrombus. •  Saline test was negative for the evidence of shunt in interatrial septum. •  The tricuspid valve appeared normal in structure. There was a  moderate sized echodense structure seen in both the posterior tricuspid leaflet but not attached to the leaflet. The appearance and location are not typical for regurgitation. This structure could be a normal variant. No evidence of tricuspid valve stenosis or significant regurgitations. •  Other valves also appear normal in structure with no evidence of stenosis or significant regurgitations.  No vegetations seen on these valves. •  There is no evidence of pericardial effusion.     CT knee right wo contrast    Result Date: 11/17/2022  1.  Large knee joint effusion again noted. Suprapatellar region collection appears slightly larger now measuring 10.3 x 3.3 cm in axial dimension and was 8.8 x 2.8 cm. The collection again shows heterogeneous mixed density. 2.  Increasing lytic lucency of the lateral tibial plateau could represent osteomyelitis.  This report was finalized on 11/17/2022 1:15 PM by Dr. Oswaldo Brown MD.      US Venous Doppler Lower Extremity Right (duplex)    Result Date: 11/12/2022  No deep venous thrombus in the right lower extremity. Signer Name: Thomas Day MD  Signed: 11/12/2022 11:34 AM  [facility]    US Venous Doppler Upper Extremity Bilateral (duplex)    Result Date: 11/12/2022  Negative examination.  No evidence of bilateral upper extremity venous thrombosis. Signer Name: Thomas Day MD  Signed: 11/12/2022 11:33 AM  Workstation Name: [facility]  [facility]    I have personally looked at the radiology images and read the final radiology report.    Assessment & Plan    Right septic knee--I did discuss the case with his orthopedic surgeon at [facility] in [place], Dr. Lopez.  After discussion with him he recommends that we continue IV daptomycin for now.  He will see patient in his office on Tuesday of next week and at that time we will aspirate the knee himself and we will plan potential further care.  I discussed  these recommendations with the patient and he is agreeable.    Likely MRSA associated endocarditis patient currently on daptomycin.  Planning follow-up TYRESE in the near future    Debility--requiring minimum assistance for up with bed mobility; contact-guard to standby assist for transfers; ambulated 160 feet yesterday with front wheel walker and standby assist.  Patient continues to work on motor skills to improve ADLs and functional mobility.    Diabetes mellitus modest control    Lumbago stable    Pancytopenia likely secondary to cirrhosis    Orthostatic hypotension much improved patient is on midodrine and Florinef.  VTE Prophylaxis:   Mechanical Order History:      Ordered        11/17/22 1442  Place Sequential Compression Device  Once            11/17/22 1442  Maintain Sequential Compression Device  Continuous                    Pharmalogical Order History:      Ordered     Dose Route Frequency Stop    11/18/22 1522  fondaparinux (ARIXTRA) injection 2.5 mg         2.5 mg SC Every 24 Hours Scheduled --    10/28/22 1742  fondaparinux (ARIXTRA) injection 2.5 mg  Status:  Discontinued         2.5 mg SC Every 24 Hours Scheduled 11/17/22 1442                    Joe Mike MD  [facility] Hospitalist  11/19/22  10:39 EST

## 2022-11-20 ENCOUNTER — APPOINTMENT (OUTPATIENT)
Dept: GENERAL RADIOLOGY | Facility: HOSPITAL | Age: 57
End: 2022-11-20

## 2022-11-20 LAB
ANION GAP SERPL CALCULATED.3IONS-SCNC: 9.5 MMOL/L (ref 5–15)
BASOPHILS # BLD AUTO: 0.01 10*3/MM3 (ref 0–0.2)
BASOPHILS NFR BLD AUTO: 0.3 % (ref 0–1.5)
BUN SERPL-MCNC: 8 MG/DL (ref 6–20)
BUN/CREAT SERPL: 14 (ref 7–25)
CALCIUM SPEC-SCNC: 8.2 MG/DL (ref 8.6–10.5)
CHLORIDE SERPL-SCNC: 100 MMOL/L (ref 98–107)
CO2 SERPL-SCNC: 25.5 MMOL/L (ref 22–29)
CREAT SERPL-MCNC: 0.57 MG/DL (ref 0.76–1.27)
DEPRECATED RDW RBC AUTO: 50.4 FL (ref 37–54)
EGFRCR SERPLBLD CKD-EPI 2021: 114.4 ML/MIN/1.73
EOSINOPHIL # BLD AUTO: 0.08 10*3/MM3 (ref 0–0.4)
EOSINOPHIL NFR BLD AUTO: 2.4 % (ref 0.3–6.2)
ERYTHROCYTE [DISTWIDTH] IN BLOOD BY AUTOMATED COUNT: 16.6 % (ref 12.3–15.4)
FLUAV SUBTYP SPEC NAA+PROBE: NOT DETECTED
FLUBV RNA ISLT QL NAA+PROBE: NOT DETECTED
GLUCOSE BLDC GLUCOMTR-MCNC: 143 MG/DL (ref 70–130)
GLUCOSE BLDC GLUCOMTR-MCNC: 260 MG/DL (ref 70–130)
GLUCOSE BLDC GLUCOMTR-MCNC: 289 MG/DL (ref 70–130)
GLUCOSE SERPL-MCNC: 139 MG/DL (ref 65–99)
HCT VFR BLD AUTO: 31 % (ref 37.5–51)
HGB BLD-MCNC: 9.6 G/DL (ref 13–17.7)
IMM GRANULOCYTES # BLD AUTO: 0.01 10*3/MM3 (ref 0–0.05)
IMM GRANULOCYTES NFR BLD AUTO: 0.3 % (ref 0–0.5)
LYMPHOCYTES # BLD AUTO: 0.7 10*3/MM3 (ref 0.7–3.1)
LYMPHOCYTES NFR BLD AUTO: 20.6 % (ref 19.6–45.3)
MCH RBC QN AUTO: 25.8 PG (ref 26.6–33)
MCHC RBC AUTO-ENTMCNC: 31 G/DL (ref 31.5–35.7)
MCV RBC AUTO: 83.3 FL (ref 79–97)
MONOCYTES # BLD AUTO: 0.3 10*3/MM3 (ref 0.1–0.9)
MONOCYTES NFR BLD AUTO: 8.8 % (ref 5–12)
NEUTROPHILS NFR BLD AUTO: 2.3 10*3/MM3 (ref 1.7–7)
NEUTROPHILS NFR BLD AUTO: 67.6 % (ref 42.7–76)
NRBC BLD AUTO-RTO: 0 /100 WBC (ref 0–0.2)
PLATELET # BLD AUTO: 126 10*3/MM3 (ref 140–450)
PMV BLD AUTO: 10.2 FL (ref 6–12)
POTASSIUM SERPL-SCNC: 3.8 MMOL/L (ref 3.5–5.2)
RBC # BLD AUTO: 3.72 10*6/MM3 (ref 4.14–5.8)
SARS-COV-2 RNA PNL SPEC NAA+PROBE: NOT DETECTED
SODIUM SERPL-SCNC: 135 MMOL/L (ref 136–145)
WBC NRBC COR # BLD: 3.4 10*3/MM3 (ref 3.4–10.8)

## 2022-11-20 PROCEDURE — 82962 GLUCOSE BLOOD TEST: CPT

## 2022-11-20 PROCEDURE — 87040 BLOOD CULTURE FOR BACTERIA: CPT | Performed by: FAMILY MEDICINE

## 2022-11-20 PROCEDURE — 87150 DNA/RNA AMPLIFIED PROBE: CPT | Performed by: FAMILY MEDICINE

## 2022-11-20 PROCEDURE — 87147 CULTURE TYPE IMMUNOLOGIC: CPT | Performed by: FAMILY MEDICINE

## 2022-11-20 PROCEDURE — 71045 X-RAY EXAM CHEST 1 VIEW: CPT

## 2022-11-20 PROCEDURE — 25010000002 DAPTOMYCIN PER 1 MG: Performed by: FAMILY MEDICINE

## 2022-11-20 PROCEDURE — 87636 SARSCOV2 & INF A&B AMP PRB: CPT | Performed by: FAMILY MEDICINE

## 2022-11-20 PROCEDURE — 80048 BASIC METABOLIC PNL TOTAL CA: CPT | Performed by: FAMILY MEDICINE

## 2022-11-20 PROCEDURE — 87181 SC STD AGAR DILUTION PER AGT: CPT | Performed by: FAMILY MEDICINE

## 2022-11-20 PROCEDURE — 84145 PROCALCITONIN (PCT): CPT | Performed by: FAMILY MEDICINE

## 2022-11-20 PROCEDURE — 99232 SBSQ HOSP IP/OBS MODERATE 35: CPT | Performed by: PHYSICIAN ASSISTANT

## 2022-11-20 PROCEDURE — 63710000001 INSULIN DETEMIR PER 5 UNITS: Performed by: INTERNAL MEDICINE

## 2022-11-20 PROCEDURE — 25010000002 FONDAPARINUX PER 0.5 MG: Performed by: FAMILY MEDICINE

## 2022-11-20 PROCEDURE — 86140 C-REACTIVE PROTEIN: CPT | Performed by: INTERNAL MEDICINE

## 2022-11-20 PROCEDURE — 82550 ASSAY OF CK (CPK): CPT | Performed by: PHYSICIAN ASSISTANT

## 2022-11-20 PROCEDURE — 87186 SC STD MICRODIL/AGAR DIL: CPT | Performed by: FAMILY MEDICINE

## 2022-11-20 PROCEDURE — 99231 SBSQ HOSP IP/OBS SF/LOW 25: CPT | Performed by: FAMILY MEDICINE

## 2022-11-20 PROCEDURE — 63710000001 INSULIN ASPART PER 5 UNITS: Performed by: INTERNAL MEDICINE

## 2022-11-20 PROCEDURE — 85025 COMPLETE CBC W/AUTO DIFF WBC: CPT | Performed by: FAMILY MEDICINE

## 2022-11-20 RX ADMIN — INSULIN DETEMIR 18 UNITS: 100 INJECTION, SOLUTION SUBCUTANEOUS at 09:25

## 2022-11-20 RX ADMIN — VITAMINS A AND D OINTMENT 1 APPLICATION: 15.5; 53.4 OINTMENT TOPICAL at 21:20

## 2022-11-20 RX ADMIN — FLUDROCORTISONE ACETATE 50 MCG: 0.1 TABLET ORAL at 09:16

## 2022-11-20 RX ADMIN — Medication 150 MG: at 09:16

## 2022-11-20 RX ADMIN — ACETAMINOPHEN 650 MG: 325 TABLET, FILM COATED ORAL at 23:56

## 2022-11-20 RX ADMIN — PANTOPRAZOLE SODIUM 40 MG: 40 TABLET, DELAYED RELEASE ORAL at 05:55

## 2022-11-20 RX ADMIN — OXYCODONE HYDROCHLORIDE AND ACETAMINOPHEN 500 MG: 500 TABLET ORAL at 09:16

## 2022-11-20 RX ADMIN — PREGABALIN 100 MG: 100 CAPSULE ORAL at 21:20

## 2022-11-20 RX ADMIN — DICLOFENAC 4 G: 10 GEL TOPICAL at 09:17

## 2022-11-20 RX ADMIN — FONDAPARINUX SODIUM 2.5 MG: 2.5 INJECTION, SOLUTION SUBCUTANEOUS at 09:17

## 2022-11-20 RX ADMIN — Medication 1 TABLET: at 09:16

## 2022-11-20 RX ADMIN — INSULIN DETEMIR 18 UNITS: 100 INJECTION, SOLUTION SUBCUTANEOUS at 21:21

## 2022-11-20 RX ADMIN — INSULIN ASPART 8 UNITS: 100 INJECTION, SOLUTION INTRAVENOUS; SUBCUTANEOUS at 12:18

## 2022-11-20 RX ADMIN — ASPIRIN 81 MG: 81 TABLET, COATED ORAL at 09:16

## 2022-11-20 RX ADMIN — HYDROCODONE BITARTRATE AND ACETAMINOPHEN 1 TABLET: 5; 325 TABLET ORAL at 21:20

## 2022-11-20 RX ADMIN — CARBIDOPA AND LEVODOPA 10 MG: 50; 200 TABLET, EXTENDED RELEASE ORAL at 06:33

## 2022-11-20 RX ADMIN — CARBIDOPA AND LEVODOPA 10 MG: 50; 200 TABLET, EXTENDED RELEASE ORAL at 16:38

## 2022-11-20 RX ADMIN — DAPTOMYCIN 500 MG: 500 INJECTION, POWDER, LYOPHILIZED, FOR SOLUTION INTRAVENOUS at 19:10

## 2022-11-20 RX ADMIN — DOCUSATE SODIUM 100 MG: 100 CAPSULE ORAL at 09:16

## 2022-11-20 RX ADMIN — DICLOFENAC 4 G: 10 GEL TOPICAL at 21:20

## 2022-11-20 RX ADMIN — POLYETHYLENE GLYCOL (3350) 17 G: 17 POWDER, FOR SOLUTION ORAL at 09:16

## 2022-11-20 RX ADMIN — DOCUSATE SODIUM 100 MG: 100 CAPSULE ORAL at 21:20

## 2022-11-20 RX ADMIN — CARBIDOPA AND LEVODOPA 10 MG: 50; 200 TABLET, EXTENDED RELEASE ORAL at 12:17

## 2022-11-20 RX ADMIN — LEVOTHYROXINE SODIUM 25 MCG: 0.07 TABLET ORAL at 05:55

## 2022-11-20 RX ADMIN — PREGABALIN 100 MG: 100 CAPSULE ORAL at 09:25

## 2022-11-20 RX ADMIN — INSULIN ASPART 8 UNITS: 100 INJECTION, SOLUTION INTRAVENOUS; SUBCUTANEOUS at 16:38

## 2022-11-20 RX ADMIN — DICLOFENAC 4 G: 10 GEL TOPICAL at 12:18

## 2022-11-20 NOTE — PROGRESS NOTES
PROGRESS NOTE         Patient Identification:  Name:  Melchor Hardwick III  Age:  57 y.o.  Sex:  male  :  1965  MRN:  [id number]  Visit Number:  [id number]  Primary Care Provider:  Missy Up APRN         LOS: 23 days       ----------------------------------------------------------------------------------------------------------------------  Subjective       Chief Complaints:    No chief complaint on file.        Interval History:      The patient is resting comfortably in bed on room air in no apparent distress.  Nurse is at bedside.  Patient reports continued right knee stiffness but no pain this morning.  Edema is somewhat improved this morning.  Patient is scheduled for an appointment with his surgeon on Tuesday, 2022.  Afebrile, no diarrhea.  Leukopenia is resolved with a WBC of 3.40.    Review of Systems:    Constitutional: no fever, chills and night sweats.  Generalized fatigue.  Eyes: no eye drainage, itching or redness.  HEENT: no mouth sores, dysphagia or nose bleed.  Respiratory: no for shortness of breath, cough or production of sputum.  Cardiovascular: no chest pain, no palpitations, no orthopnea.  Gastrointestinal: no nausea, vomiting or diarrhea. No abdominal pain, hematemesis or rectal bleeding.  Genitourinary: no dysuria or polyuria.  Hematologic/lymphatic: no lymph node abnormalities, no easy bruising or easy bleeding.  Musculoskeletal: no muscle or joint pain.  Right knee stiffness. Chronic back spasms.  Skin: No rash and no itching.  Neurological: no loss of consciousness, no seizure, no headache.  Behavioral/Psych: no depression or suicidal ideation.  Endocrine: no hot flashes.  Immunologic: negative.    ----------------------------------------------------------------------------------------------------------------------      Objective       [facility] Meds:  ascorbic acid, 500 mg, Oral, Daily  aspirin, 81 mg, Oral, Daily  DAPTOmycin, 6 mg/kg (Adjusted),  Intravenous, Q24H  Diclofenac Sodium, 4 g, Topical, 4x Daily  docusate sodium, 100 mg, Oral, BID  fludrocortisone, 50 mcg, Oral, Daily  fondaparinux, 2.5 mg, Subcutaneous, Q24H  Insulin Aspart, 0-14 Units, Subcutaneous, TID AC  insulin detemir, 18 Units, Subcutaneous, Q12H  iron polysaccharides, 150 mg, Oral, Daily  levothyroxine, 25 mcg, Oral, Q AM  lidocaine PF 2%, 10 mL, Injection, Once  midodrine, 10 mg, Oral, TID AC  multivitamin with minerals, 1 tablet, Oral, Daily  pantoprazole, 40 mg, Oral, Q AM  polyethylene glycol, 17 g, Oral, Daily  pregabalin, 100 mg, Oral, Q12H      Pharmacy Consult,       ----------------------------------------------------------------------------------------------------------------------    Vital Signs:  Temp:  [99 °F (37.2 °C)-99.7 °F (37.6 °C)] 99 °F (37.2 °C)  Heart Rate:  [79-91] 91  Resp:  [18] 18  BP: (114-128)/(59-63) 114/63  Mean Arterial Pressure (Non-Invasive) for the past 24 hrs (Last 3 readings):   Noninvasive MAP (mmHg)   11/20/22 0910 80     SpO2 Percentage    11/19/22 1900 11/20/22 0556 11/20/22 0910   SpO2: 95% 96% 93%     SpO2:  [93 %-96 %] 93 %  on   ;   Device (Oxygen Therapy): room air    Body mass index is 28.34 kg/m².  Wt Readings from Last 3 Encounters:   11/01/22 89.6 kg (197 lb 8.5 oz)   09/23/22 98 kg (216 lb)   06/17/22 115 kg (254 lb)        Intake/Output Summary (Last 24 hours) at 11/20/2022 1154  Last data filed at 11/20/2022 0900  Gross per 24 hour   Intake 1440 ml   Output 2350 ml   Net -910 ml     Diet Regular Texture (IDDSI 7); Regular Consistency; Diabetic Diets; Consistent Carbohydrate  ----------------------------------------------------------------------------------------------------------------------      Physical Exam:    Constitutional: Chronically ill-appearing male is sitting up in bed on room air in no apparent distress.  Nurse is at bedside.  HENT:  Head: Normocephalic and atraumatic.  Mouth:  Moist mucous membranes.    Cardiovascular:   Normal rate, regular rhythm and normal heart sounds with no murmur. No edema.  Pulmonary/Chest:  No respiratory distress, no wheezes, no crackles, with normal breath sounds and good air movement.  Abdominal:  Soft.  Bowel sounds are normal.  No distension and no tenderness.   Musculoskeletal:  No edema, no tenderness, and no deformity.  No swelling or redness of joints.  Neurological:  Alert and oriented to person, place, and time.  No facial droop.  No slurred speech.   Skin:  Skin is warm and dry.  No rash noted.  No pallor.  Right knee surgical incision with good healing and staples removed.  Some erythema noted as well as effusion of the right knee.  Right lower extremity edema is mildly improved today.  Psychiatric:  Normal mood and affect.  Behavior is normal.    ----------------------------------------------------------------------------------------------------------------------  Results from last 7 days   Lab Units 11/14/22 0226   CK TOTAL U/L 18*           Results from last 7 days   Lab Units 11/20/22 0452 11/18/22 0300 11/17/22 0131 11/14/22 0226   CRP mg/dL  --  8.38* 6.73* 5.83*   WBC 10*3/mm3 3.40 2.84* 3.11* 2.42*   HEMOGLOBIN g/dL 9.6* 9.1* 8.7* 8.6*   HEMATOCRIT % 31.0* 29.4* 27.4* 28.0*   MCV fL 83.3 83.3 82.8 84.3   MCHC g/dL 31.0* 31.0* 31.8 30.7*   PLATELETS 10*3/mm3 126* 104* 109* 103*   INR   --  1.18*  --   --      Results from last 7 days   Lab Units 11/20/22 0452 11/18/22 0300 11/17/22 0131   SODIUM mmol/L 135* 132* 133*   POTASSIUM mmol/L 3.8 4.0 4.0   CHLORIDE mmol/L 100 99 100   CO2 mmol/L 25.5 24.5 25.4   BUN mg/dL 8 11 11   CREATININE mg/dL 0.57* 0.71* 0.73*   CALCIUM mg/dL 8.2* 8.3* 8.1*   GLUCOSE mg/dL 139* 199* 184*   Estimated Creatinine Clearance: 161 mL/min (A) (by C-G formula based on SCr of 0.57 mg/dL (L)).  No results found for: AMMONIA    Glucose   Date/Time Value Ref Range Status   11/20/2022 1042 289 (H) 70 - 130 mg/dL Final     Comment:     Meter: [id number]  : [id number] Audrey Mcginnis   11/20/2022 0604 143 (H) 70 - 130 mg/dL Final     Comment:     Meter: [id number] : [id number] Jericho Crystal   11/19/2022 1608 220 (H) 70 - 130 mg/dL Final     Comment:     Meter: [id number] : [id number] lisa ashley   11/19/2022 1109 237 (H) 70 - 130 mg/dL Final     Comment:     Meter: [id number] : [id number] lisa ashley   11/19/2022 0621 121 70 - 130 mg/dL Final     Comment:     Meter: [id number] : [id number] Tracey Crystal   11/18/2022 1558 251 (H) 70 - 130 mg/dL Final     Comment:     Meter: [id number] : [id number] lisa ashley   11/18/2022 1101 224 (H) 70 - 130 mg/dL Final     Comment:     Meter: [id number] : [id number] mark burnettemaria emax   11/18/2022 0619 139 (H) 70 - 130 mg/dL Final     Comment:     Meter: [id number] : [id number] Goldmanbard Velez     Lab Results   Component Value Date    HGBA1C 8.80 (H) 09/20/2022     Lab Results   Component Value Date    TSH 2.390 10/10/2022    FREET4 0.87 (L) 10/10/2022       Blood Culture   Date Value Ref Range Status   10/24/2022 No growth at 5 days  Final   10/24/2022 No growth at 5 days  Final     No results found for: URINECX  No results found for: WOUNDCX  No results found for: STOOLCX  No results found for: RESPCX  Pain Management Panel       Pain Management Panel Latest Ref Rng & Units 5/24/2022 5/11/2022    CREATININE UR mg/dL 91.0 -    AMPHETAMINES SCREEN, URINE Negative - Negative    BARBITURATES SCREEN Negative - Negative    BENZODIAZEPINE SCREEN, URINE Negative - Negative    BUPRENORPHINEUR Negative - Negative    COCAINE SCREEN, URINE Negative - Negative    METHADONE SCREEN, URINE Negative - Negative    METHAMPHETAMINEUR Negative - Negative              ----------------------------------------------------------------------------------------------------------------------  Imaging Results (Last 24 Hours)       ** No results found for the last 24 hours. **             ----------------------------------------------------------------------------------------------------------------------    Pertinent Infectious Disease Results     COVID-19 PCR from 10/28/2022 negative.  Blood cultures from 10/18/2022 2 out of 2 sets positive for MRSA.  Blood cultures from 10/20/2022 2 out of 2 sets positive for MRSA.  Blood cultures from 10/22/2022 1 out of 2 sets positive for MRSA.  Blood cultures from 10/24/2022 finalized as no growth.  2D echo from 10/24/2022 reports no evidence of vegetation.  Updated TYRESE on 11/1/2022 showed a moderate sized echodense structure on the posterior tricuspid leaflet but not attached to the leaflet.  The appearance and location are not typical for regurgitation.  This structure could be a normal variant.  No evidence of tricuspid valve stenosis or significant regurgitations.  Other valves also appear normal in structure with no evidence of stenosis or significant regurgitations.  No vegetation seen on these valves. Right lower extremity venous duplex negative for DVT.  Bilateral upper extremity venous duplex negative for DVT.     CT right knee without contrast on 11/17/2022 showed a large joint effusion again.  Suprapatellar region collection appears slightly larger now measuring 10.3 x 3.3 cm in axial dimension and was 8.8 x 2.8 cm.  The collection again shows heterogenous mixed density.  Increasing lytic lucency at the lateral tibial plateau could represent osteomyelitis.  Blood cultures on 11/12/2022 finalized as no growth.    Assessment/Plan       Assessment     Right knee septic arthritis  MRSA bacteremia  Possible endocarditis    Plan      Examined the patient this morning and he appears very comfortable on room air in no apparent distress.  Nurse is at bedside.  Patient reports continued right knee stiffness, but no pain this morning.  He is scheduled for an appointment with his surgeon on Tuesday, 11/22/2022.  Right lower extremity edema is somewhat  improved, but there is continued erythema and effusion around the knee.  Lungs are clear to auscultation bilaterally.  Remains afebrile without diarrhea.  Leukopenia is now resolved.    For now, patient remains on daptomycin.  TYRESE may have to be delayed until orthopedic intervention is performed.  Overall, this is a very complicated case.    Code Status:   Code Status and Medical Interventions:   Ordered at: 11/03/22 1015     Level Of Support Discussed With:    Patient     Code Status (Patient has no pulse and is not breathing):    CPR (Attempt to Resuscitate)     Medical Interventions (Patient has pulse or is breathing):    Full Support     Release to patient:    Routine Release       Sara Sales PA-C  11/20/22  11:54 EST

## 2022-11-20 NOTE — PROGRESS NOTES
[facility]  PROGRESS NOTE     Patient Identification:  Name:  Melchor Hardwick III  Age:  57 y.o.  Sex:  male  :  1965  MRN:  [id number]  Visit Number:  [id number]  ROOM: 109/2S     Primary Care Provider:  Missy Up APRN    Length of stay in inpatient status:  23    Subjective     Chief Compliant:  No chief complaint on file.      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  Patient has recurrence of knee effusion and has findings consistent with osteomyelitis on the tibial plateau per CT.  Patient states he has no increase in soreness today.  Patient is scheduled to see Ortho at [facility] in [place] on Tuesday of this week.  He will see him as an outpatient and joint aspiration will be done at that appointment per my conversation with Dr. Lopez.  He will later decide how to proceed    Objective     Current Hospital Meds:ascorbic acid, 500 mg, Oral, Daily  aspirin, 81 mg, Oral, Daily  DAPTOmycin, 6 mg/kg (Adjusted), Intravenous, Q24H  Diclofenac Sodium, 4 g, Topical, 4x Daily  docusate sodium, 100 mg, Oral, BID  fludrocortisone, 50 mcg, Oral, Daily  fondaparinux, 2.5 mg, Subcutaneous, Q24H  Insulin Aspart, 0-14 Units, Subcutaneous, TID AC  insulin detemir, 18 Units, Subcutaneous, Q12H  iron polysaccharides, 150 mg, Oral, Daily  levothyroxine, 25 mcg, Oral, Q AM  lidocaine PF 2%, 10 mL, Injection, Once  midodrine, 10 mg, Oral, TID AC  multivitamin with minerals, 1 tablet, Oral, Daily  pantoprazole, 40 mg, Oral, Q AM  polyethylene glycol, 17 g, Oral, Daily  pregabalin, 100 mg, Oral, Q12H    Pharmacy Consult,       ----------------------------------------------------------------------------------------------------------------------  Vital Signs:  Temp:  [99 °F (37.2 °C)-99.7 °F (37.6 °C)] 99 °F (37.2 °C)  Heart Rate:  [79-92] 92  Resp:  [18] 18  BP: (114-128)/(59-69) 123/69  SpO2:  [93 %-96 %] 93 %   on   ;   Device (Oxygen Therapy): room air  Body mass index is 28.34 kg/m².    Wt Readings from Last 3 Encounters:   11/01/22 89.6 kg (197 lb 8.5 oz)   09/23/22 98 kg (216 lb)   06/17/22 115 kg (254 lb)     Intake & Output (last 3 days)       11/17 0701  11/18 0700 11/18 0701  11/19 0700 11/19 0701 11/20 0700 11/20 0701  11/21 0700    P.O. 1320 1800 1800     Total Intake(mL/kg) 1320 (14.7) 1800 (20.1) 1800 (20.1)     Urine (mL/kg/hr) 3925 (1.8) 3250 (1.5) 3000 (1.4) 300 (0.6)    Stool    0    Total Output 3925 3250 3000 300    Net -2605 -1450 -1200 -300            Urine Unmeasured Occurrence  1 x 5 x     Stool Unmeasured Occurrence    1 x        Diet Regular Texture (IDDSI 7); Regular Consistency; Diabetic Diets; Consistent Carbohydrate  ----------------------------------------------------------------------------------------------------------------------  Physical exam:  Constitutional:   No acute distress  HEENT: Normocephalic atraumatic  Neck:    Supple  Cardiovascular: Regular rate and rhythm  Pulmonary/Chest: Clear to auscultation  Abdominal: Positive bowel sounds soft.   Musculoskeletal: Right knee--there is no erythema noted, does have right knee effusion.  No increased warmth noted  Neurological: No focal deficits  Skin: No rash  Peripheral vascular:  Genitourinary:  ----------------------------------------------------------------------------------------------------------------------    Last echocardiogram:  Results for orders placed during the hospital encounter of 10/28/22    Adult Transesophageal Echo (TYRESE) W/ Cont if Necessary Per Protocol    Interpretation Summary  •  Indication for TYRESE -to rule out infective endocarditis in a patient with MRSA bacteremia.  •  Findings-  •  Left ventricular systolic function is normal. Estimated left ventricular EF = 60%  •  Left atrial appendage is well visualized and is free of thrombus.  •  Saline test was negative for the evidence of shunt in interatrial septum.  •   The tricuspid valve appeared normal in structure. There was a moderate sized echodense structure seen above  the posterior tricuspid leaflet but not attached to the leaflet. The appearance and location are not typical for regurgitation. This structure could be a normal variant. No evidence of tricuspid valve stenosis or significant regurgitations.  •  Other valves also appear normal in structure with no evidence of stenosis or significant regurgitations.  No vegetations seen on these valves.  •  There is no evidence of pericardial effusion.  •  Comment-  •  In view of presence of MRSA bacteremia, recommend to extend antibiotic therapy for possible infective endocarditis and repeat TYRESE in 3 months.    ----------------------------------------------------------------------------------------------------------------------  Results from last 7 days   Lab Units 11/20/22 0452 11/18/22 0300 11/17/22 0131 11/14/22 0226   CRP mg/dL  --  8.38* 6.73* 5.83*   WBC 10*3/mm3 3.40 2.84* 3.11* 2.42*   HEMOGLOBIN g/dL 9.6* 9.1* 8.7* 8.6*   HEMATOCRIT % 31.0* 29.4* 27.4* 28.0*   MCV fL 83.3 83.3 82.8 84.3   MCHC g/dL 31.0* 31.0* 31.8 30.7*   PLATELETS 10*3/mm3 126* 104* 109* 103*   INR   --  1.18*  --   --          Results from last 7 days   Lab Units 11/20/22 0452 11/18/22 0300 11/17/22 0131   SODIUM mmol/L 135* 132* 133*   POTASSIUM mmol/L 3.8 4.0 4.0   CHLORIDE mmol/L 100 99 100   CO2 mmol/L 25.5 24.5 25.4   BUN mg/dL 8 11 11   CREATININE mg/dL 0.57* 0.71* 0.73*   CALCIUM mg/dL 8.2* 8.3* 8.1*   GLUCOSE mg/dL 139* 199* 184*   Estimated Creatinine Clearance: 161 mL/min (A) (by C-G formula based on SCr of 0.57 mg/dL (L)).  No results found for: AMMONIA  Results from last 7 days   Lab Units 11/14/22  0226   CK TOTAL U/L 18*             Glucose   Date/Time Value Ref Range Status   11/20/2022 1042 289 (H) 70 - 130 mg/dL Final     Comment:     Meter: [id number] : [id number] Audrey Mcginnis   11/20/2022 0604 143 (H) 70 - 130  mg/dL Final     Comment:     Meter: [id number] : [id number] [place]   11/19/2022 1608 220 (H) 70 - 130 mg/dL Final     Comment:     Meter: [id number] : [id number] lisa ashley   11/19/2022 1109 237 (H) 70 - 130 mg/dL Final     Comment:     Meter: [id number] : [id number] lisa ashley   11/19/2022 0621 121 70 - 130 mg/dL Final     Comment:     Meter: [id number] : [id number] [place]   11/18/2022 1558 251 (H) 70 - 130 mg/dL Final     Comment:     Meter: [id number] : [id number] lisa ashley   11/18/2022 1101 224 (H) 70 - 130 mg/dL Final     Comment:     Meter: [id number] : [id number] mark massengill   11/18/2022 0619 139 (H) 70 - 130 mg/dL Final     Comment:     Meter: [id number] : [id number] Goldman Freda     Lab Results   Component Value Date    TSH 2.390 10/10/2022    FREET4 0.87 (L) 10/10/2022     No results found for: PREGTESTUR, PREGSERUM, HCG, HCGQUANT  Pain Management Panel     Pain Management Panel Latest Ref Rng & Units 5/24/2022 5/11/2022    CREATININE UR mg/dL 91.0 -    AMPHETAMINES SCREEN, URINE Negative - Negative    BARBITURATES SCREEN Negative - Negative    BENZODIAZEPINE SCREEN, URINE Negative - Negative    BUPRENORPHINEUR Negative - Negative    COCAINE SCREEN, URINE Negative - Negative    METHADONE SCREEN, URINE Negative - Negative    METHAMPHETAMINEUR Negative - Negative        Brief Urine Lab Results  (Last result in the past 365 days)      Color   Clarity   Blood   Leuk Est   Nitrite   Protein   CREAT   Urine HCG        10/19/22 0746 Yellow   Clear   Negative   Negative   Negative   Negative               No results found for: BLOODCX      No results found for: URINECX  No results found for: WOUNDCX  No results found for: STOOLCX  Results from last 7 days   Lab Units 11/18/22  0300 11/17/22  0131 11/14/22  0226   CRP mg/dL 8.38* 6.73* 5.83*       I have personally looked at the labs and they are summarized  above.  ----------------------------------------------------------------------------------------------------------------------  Detailed radiology reports for the last 24 hours:    Imaging Results (Last 24 Hours)     ** No results found for the last 24 hours. **        Final impressions for the last 30 days of radiology reports:    Adult Transesophageal Echo (TYRESE) W/ Cont if Necessary Per Protocol    Addendum Date: 11/4/2022    •  Indication for TYRESE -to rule out infective endocarditis in a patient with MRSA bacteremia. •  Findings- •  Left ventricular systolic function is normal. Estimated left ventricular EF = 60% •  Left atrial appendage is well visualized and is free of thrombus. •  Saline test was negative for the evidence of shunt in interatrial septum. •  The tricuspid valve appeared normal in structure. There was a moderate sized echodense structure seen above  the posterior tricuspid leaflet but not attached to the leaflet. The appearance and location are not typical for regurgitation. This structure could be a normal variant. No evidence of tricuspid valve stenosis or significant regurgitations. •  Other valves also appear normal in structure with no evidence of stenosis or significant regurgitations.  No vegetations seen on these valves. •  There is no evidence of pericardial effusion. •  Comment- •  In view of presence of MRSA bacteremia, recommend to extend antibiotic therapy for possible infective endocarditis and repeat TYRESE in 3 months.     Addendum Date: 11/3/2022    •  Indication for TYRESE -to rule out infective endocarditis in a patient with MRSA bacteremia. •  Findings- •  Left ventricular systolic function is normal. Estimated left ventricular EF = 60% •  Left atrial appendage is well visualized and is free of thrombus. •  Saline test was negative for the evidence of shunt in interatrial septum. •  The tricuspid valve appeared normal in structure. There was a moderate sized echodense structure seen  above  the posterior tricuspid leaflet but not attached to the leaflet. The appearance and location are not typical for regurgitation. This structure could be a normal variant. No evidence of tricuspid valve stenosis or significant regurgitations. •  Other valves also appear normal in structure with no evidence of stenosis or significant regurgitations.  No vegetations seen on these valves. •  There is no evidence of pericardial effusion. •  Comment- •  In view of presence of MRSA bacteremia, recommend to extend antibiotic therapy for infective endocarditis and repeat TYRESE in 3 months.     Addendum Date: 11/3/2022    •  Indication for TYRESE -to rule out infective endocarditis in a patient with MRSA bacteremia. •  Findings- •  Left ventricular systolic function is normal. Estimated left ventricular EF = 60% •  Left atrial appendage is well visualized and is free of thrombus. •  Saline test was negative for the evidence of shunt in interatrial septum. •  The tricuspid valve appeared normal in structure. There was a moderate sized echodense structure seen above  the posterior tricuspid leaflet but not attached to the leaflet. The appearance and location are not typical for regurgitation. This structure could be a normal variant. No evidence of tricuspid valve stenosis or significant regurgitations. •  Other valves also appear normal in structure with no evidence of stenosis or significant regurgitations.  No vegetations seen on these valves. •  There is no evidence of pericardial effusion.     Addendum Date: 11/3/2022    •  Indication for TYRESE -to rule out infective endocarditis in a patient with MRSA bacteremia. •  Findings- •  Left ventricular systolic function is normal. Estimated left ventricular EF = 60% •  Left atrial appendage is well visualized and is free of thrombus. •  Saline test was negative for the evidence of shunt in interatrial septum. •  The tricuspid valve appeared normal in structure. There was a  moderate sized echodense structure seen in both the posterior tricuspid leaflet but not attached to the leaflet. The appearance and location are not typical for regurgitation. This structure could be a normal variant. No evidence of tricuspid valve stenosis or significant regurgitations. •  Other valves also appear normal in structure with no evidence of stenosis or significant regurgitations.  No vegetations seen on these valves. •  There is no evidence of pericardial effusion.     CT knee right wo contrast    Result Date: 11/17/2022  1.  Large knee joint effusion again noted. Suprapatellar region collection appears slightly larger now measuring 10.3 x 3.3 cm in axial dimension and was 8.8 x 2.8 cm. The collection again shows heterogeneous mixed density. 2.  Increasing lytic lucency of the lateral tibial plateau could represent osteomyelitis.  This report was finalized on 11/17/2022 1:15 PM by Dr. Oswaldo Brown MD.      US Venous Doppler Lower Extremity Right (duplex)    Result Date: 11/12/2022  No deep venous thrombus in the right lower extremity. Signer Name: Thomas Day MD  Signed: 11/12/2022 11:34 AM  Workstation Name: [email or web address]  [facility]    US Venous Doppler Upper Extremity Bilateral (duplex)    Result Date: 11/12/2022  Negative examination.  No evidence of bilateral upper extremity venous thrombosis. Signer Name: Thomas Day MD  Signed: 11/12/2022 11:33 AM  Workstation Name: [email or web address]  [facility]    I have personally looked at the radiology images and read the final radiology report.    Assessment & Plan    Right septic knee with likely osteomyelitis--patient again will be following up with Ortho in [place] on Tuesday of this week with Dr. Lopez.  After discussion with him he recommends that we continue IV daptomycin for now and he will likely aspirate knee during his appointment and will discussed with patient plan for further care of knee.    Likely  MRSA associated endocarditis.  Patient is currently on daptomycin which we will continue at this time.  Is scheduled for TYRESE tomorrow but we will delay this at this time I will until patient has had right knee further evaluated    Debility--patient requiring minimum assistance for bed mobility; contact-guard for to standby assist for transfers; ambulated 160 feet last week with front wheel walker and standby assistance.    Diabetes mellitus continue current treatment    Lumbago stable    Pancytopenia secondary to cirrhosis    Cirrhosis--stable    Orthostatic hypotension much improved is on midodrine and Florinef.      VTE Prophylaxis:   Mechanical Order History:      Ordered        11/17/22 1442  Place Sequential Compression Device  Once            11/17/22 1442  Maintain Sequential Compression Device  Continuous                    Pharmalogical Order History:      Ordered     Dose Route Frequency Stop    11/18/22 1522  fondaparinux (ARIXTRA) injection 2.5 mg         2.5 mg SC Every 24 Hours Scheduled --    10/28/22 1742  fondaparinux (ARIXTRA) injection 2.5 mg  Status:  Discontinued         2.5 mg SC Every 24 Hours Scheduled 11/17/22 1442                  Joe Mike MD  [facility] Hospitalist  11/20/22  12:18 EST

## 2022-11-20 NOTE — PLAN OF CARE
Goal Outcome Evaluation:               Pt is progressing medically and physically with all therapies, continue  with current plan of care.

## 2022-11-21 LAB
ANION GAP SERPL CALCULATED.3IONS-SCNC: 9.5 MMOL/L (ref 5–15)
BACTERIA BLD CULT: ABNORMAL
BASOPHILS # BLD AUTO: 0.01 10*3/MM3 (ref 0–0.2)
BASOPHILS # BLD AUTO: 0.01 10*3/MM3 (ref 0–0.2)
BASOPHILS NFR BLD AUTO: 0.3 % (ref 0–1.5)
BASOPHILS NFR BLD AUTO: 0.3 % (ref 0–1.5)
BILIRUB UR QL STRIP: NEGATIVE
BUN SERPL-MCNC: 9 MG/DL (ref 6–20)
BUN/CREAT SERPL: 14.1 (ref 7–25)
CALCIUM SPEC-SCNC: 7.9 MG/DL (ref 8.6–10.5)
CHLORIDE SERPL-SCNC: 99 MMOL/L (ref 98–107)
CK SERPL-CCNC: 23 U/L (ref 20–200)
CLARITY UR: CLEAR
CO2 SERPL-SCNC: 22.5 MMOL/L (ref 22–29)
COLOR UR: YELLOW
CREAT SERPL-MCNC: 0.64 MG/DL (ref 0.76–1.27)
CRP SERPL-MCNC: 7.32 MG/DL (ref 0–0.5)
DEPRECATED RDW RBC AUTO: 49.5 FL (ref 37–54)
DEPRECATED RDW RBC AUTO: 50.8 FL (ref 37–54)
EGFRCR SERPLBLD CKD-EPI 2021: 110.4 ML/MIN/1.73
EOSINOPHIL # BLD AUTO: 0.05 10*3/MM3 (ref 0–0.4)
EOSINOPHIL # BLD AUTO: 0.07 10*3/MM3 (ref 0–0.4)
EOSINOPHIL NFR BLD AUTO: 1.3 % (ref 0.3–6.2)
EOSINOPHIL NFR BLD AUTO: 1.9 % (ref 0.3–6.2)
ERYTHROCYTE [DISTWIDTH] IN BLOOD BY AUTOMATED COUNT: 16.4 % (ref 12.3–15.4)
ERYTHROCYTE [DISTWIDTH] IN BLOOD BY AUTOMATED COUNT: 16.5 % (ref 12.3–15.4)
GLUCOSE BLDC GLUCOMTR-MCNC: 219 MG/DL (ref 70–130)
GLUCOSE BLDC GLUCOMTR-MCNC: 242 MG/DL (ref 70–130)
GLUCOSE BLDC GLUCOMTR-MCNC: 266 MG/DL (ref 70–130)
GLUCOSE BLDC GLUCOMTR-MCNC: 268 MG/DL (ref 70–130)
GLUCOSE SERPL-MCNC: 291 MG/DL (ref 65–99)
GLUCOSE UR STRIP-MCNC: ABNORMAL MG/DL
HCT VFR BLD AUTO: 29.3 % (ref 37.5–51)
HCT VFR BLD AUTO: 33.2 % (ref 37.5–51)
HGB BLD-MCNC: 10.3 G/DL (ref 13–17.7)
HGB BLD-MCNC: 9.1 G/DL (ref 13–17.7)
HGB UR QL STRIP.AUTO: NEGATIVE
IMM GRANULOCYTES # BLD AUTO: 0.01 10*3/MM3 (ref 0–0.05)
IMM GRANULOCYTES # BLD AUTO: 0.01 10*3/MM3 (ref 0–0.05)
IMM GRANULOCYTES NFR BLD AUTO: 0.3 % (ref 0–0.5)
IMM GRANULOCYTES NFR BLD AUTO: 0.3 % (ref 0–0.5)
KETONES UR QL STRIP: NEGATIVE
LEUKOCYTE ESTERASE UR QL STRIP.AUTO: NEGATIVE
LYMPHOCYTES # BLD AUTO: 0.83 10*3/MM3 (ref 0.7–3.1)
LYMPHOCYTES # BLD AUTO: 0.85 10*3/MM3 (ref 0.7–3.1)
LYMPHOCYTES NFR BLD AUTO: 22.4 % (ref 19.6–45.3)
LYMPHOCYTES NFR BLD AUTO: 23.1 % (ref 19.6–45.3)
MCH RBC QN AUTO: 25.4 PG (ref 26.6–33)
MCH RBC QN AUTO: 26.1 PG (ref 26.6–33)
MCHC RBC AUTO-ENTMCNC: 31 G/DL (ref 31.5–35.7)
MCHC RBC AUTO-ENTMCNC: 31.1 G/DL (ref 31.5–35.7)
MCV RBC AUTO: 81.8 FL (ref 79–97)
MCV RBC AUTO: 84.1 FL (ref 79–97)
MONOCYTES # BLD AUTO: 0.35 10*3/MM3 (ref 0.1–0.9)
MONOCYTES # BLD AUTO: 0.36 10*3/MM3 (ref 0.1–0.9)
MONOCYTES NFR BLD AUTO: 10 % (ref 5–12)
MONOCYTES NFR BLD AUTO: 9.2 % (ref 5–12)
NEUTROPHILS NFR BLD AUTO: 2.31 10*3/MM3 (ref 1.7–7)
NEUTROPHILS NFR BLD AUTO: 2.52 10*3/MM3 (ref 1.7–7)
NEUTROPHILS NFR BLD AUTO: 64.4 % (ref 42.7–76)
NEUTROPHILS NFR BLD AUTO: 66.5 % (ref 42.7–76)
NITRITE UR QL STRIP: NEGATIVE
NRBC BLD AUTO-RTO: 0 /100 WBC (ref 0–0.2)
NRBC BLD AUTO-RTO: 0 /100 WBC (ref 0–0.2)
PH UR STRIP.AUTO: 6.5 [PH] (ref 5–8)
PLATELET # BLD AUTO: 119 10*3/MM3 (ref 140–450)
PLATELET # BLD AUTO: 126 10*3/MM3 (ref 140–450)
PMV BLD AUTO: 10.2 FL (ref 6–12)
PMV BLD AUTO: 10.3 FL (ref 6–12)
POTASSIUM SERPL-SCNC: 4 MMOL/L (ref 3.5–5.2)
PROCALCITONIN SERPL-MCNC: 0.18 NG/ML (ref 0–0.25)
PROT UR QL STRIP: NEGATIVE
RBC # BLD AUTO: 3.58 10*6/MM3 (ref 4.14–5.8)
RBC # BLD AUTO: 3.95 10*6/MM3 (ref 4.14–5.8)
SODIUM SERPL-SCNC: 131 MMOL/L (ref 136–145)
SP GR UR STRIP: 1.01 (ref 1–1.03)
UROBILINOGEN UR QL STRIP: ABNORMAL
WBC NRBC COR # BLD: 3.59 10*3/MM3 (ref 3.4–10.8)
WBC NRBC COR # BLD: 3.79 10*3/MM3 (ref 3.4–10.8)

## 2022-11-21 PROCEDURE — 97535 SELF CARE MNGMENT TRAINING: CPT

## 2022-11-21 PROCEDURE — 63710000001 INSULIN ASPART PER 5 UNITS: Performed by: INTERNAL MEDICINE

## 2022-11-21 PROCEDURE — 85025 COMPLETE CBC W/AUTO DIFF WBC: CPT | Performed by: INTERNAL MEDICINE

## 2022-11-21 PROCEDURE — 97530 THERAPEUTIC ACTIVITIES: CPT

## 2022-11-21 PROCEDURE — 97110 THERAPEUTIC EXERCISES: CPT

## 2022-11-21 PROCEDURE — 25010000002 ONDANSETRON PER 1 MG: Performed by: INTERNAL MEDICINE

## 2022-11-21 PROCEDURE — 82962 GLUCOSE BLOOD TEST: CPT

## 2022-11-21 PROCEDURE — 81003 URINALYSIS AUTO W/O SCOPE: CPT | Performed by: FAMILY MEDICINE

## 2022-11-21 PROCEDURE — 99232 SBSQ HOSP IP/OBS MODERATE 35: CPT | Performed by: INTERNAL MEDICINE

## 2022-11-21 PROCEDURE — 25010000002 FONDAPARINUX PER 0.5 MG: Performed by: FAMILY MEDICINE

## 2022-11-21 PROCEDURE — 63710000001 INSULIN DETEMIR PER 5 UNITS: Performed by: INTERNAL MEDICINE

## 2022-11-21 PROCEDURE — 25010000002 DAPTOMYCIN PER 1 MG: Performed by: FAMILY MEDICINE

## 2022-11-21 PROCEDURE — 99233 SBSQ HOSP IP/OBS HIGH 50: CPT | Performed by: INTERNAL MEDICINE

## 2022-11-21 RX ORDER — ONDANSETRON 2 MG/ML
4 INJECTION INTRAMUSCULAR; INTRAVENOUS EVERY 6 HOURS PRN
Status: DISCONTINUED | OUTPATIENT
Start: 2022-11-21 | End: 2022-11-30 | Stop reason: HOSPADM

## 2022-11-21 RX ORDER — INSULIN ASPART 100 [IU]/ML
4 INJECTION, SOLUTION INTRAVENOUS; SUBCUTANEOUS
Status: DISCONTINUED | OUTPATIENT
Start: 2022-11-21 | End: 2022-11-24

## 2022-11-21 RX ORDER — MIDODRINE HYDROCHLORIDE 2.5 MG/1
7.5 TABLET ORAL
Status: DISCONTINUED | OUTPATIENT
Start: 2022-11-21 | End: 2022-11-25

## 2022-11-21 RX ADMIN — LEVOTHYROXINE SODIUM 25 MCG: 0.07 TABLET ORAL at 05:33

## 2022-11-21 RX ADMIN — PREGABALIN 100 MG: 100 CAPSULE ORAL at 08:53

## 2022-11-21 RX ADMIN — PREGABALIN 100 MG: 100 CAPSULE ORAL at 23:58

## 2022-11-21 RX ADMIN — INSULIN ASPART 8 UNITS: 100 INJECTION, SOLUTION INTRAVENOUS; SUBCUTANEOUS at 12:11

## 2022-11-21 RX ADMIN — INSULIN ASPART 4 UNITS: 100 INJECTION, SOLUTION INTRAVENOUS; SUBCUTANEOUS at 17:31

## 2022-11-21 RX ADMIN — POLYETHYLENE GLYCOL (3350) 17 G: 17 POWDER, FOR SOLUTION ORAL at 08:40

## 2022-11-21 RX ADMIN — ONDANSETRON 4 MG: 2 INJECTION INTRAMUSCULAR; INTRAVENOUS at 12:11

## 2022-11-21 RX ADMIN — PANTOPRAZOLE SODIUM 40 MG: 40 TABLET, DELAYED RELEASE ORAL at 05:33

## 2022-11-21 RX ADMIN — INSULIN ASPART 5 UNITS: 100 INJECTION, SOLUTION INTRAVENOUS; SUBCUTANEOUS at 17:31

## 2022-11-21 RX ADMIN — CYCLOBENZAPRINE 5 MG: 10 TABLET, FILM COATED ORAL at 05:34

## 2022-11-21 RX ADMIN — INSULIN DETEMIR 18 UNITS: 100 INJECTION, SOLUTION SUBCUTANEOUS at 08:53

## 2022-11-21 RX ADMIN — Medication 150 MG: at 08:39

## 2022-11-21 RX ADMIN — FLUDROCORTISONE ACETATE 50 MCG: 0.1 TABLET ORAL at 08:39

## 2022-11-21 RX ADMIN — ASPIRIN 81 MG: 81 TABLET, COATED ORAL at 08:39

## 2022-11-21 RX ADMIN — OXYCODONE HYDROCHLORIDE AND ACETAMINOPHEN 500 MG: 500 TABLET ORAL at 08:39

## 2022-11-21 RX ADMIN — DICLOFENAC 4 G: 10 GEL TOPICAL at 12:12

## 2022-11-21 RX ADMIN — CARBIDOPA AND LEVODOPA 7.5 MG: 50; 200 TABLET, EXTENDED RELEASE ORAL at 12:10

## 2022-11-21 RX ADMIN — CARBIDOPA AND LEVODOPA 7.5 MG: 50; 200 TABLET, EXTENDED RELEASE ORAL at 17:30

## 2022-11-21 RX ADMIN — Medication 1 TABLET: at 08:39

## 2022-11-21 RX ADMIN — CARBIDOPA AND LEVODOPA 10 MG: 50; 200 TABLET, EXTENDED RELEASE ORAL at 08:41

## 2022-11-21 RX ADMIN — DAPTOMYCIN 500 MG: 500 INJECTION, POWDER, LYOPHILIZED, FOR SOLUTION INTRAVENOUS at 20:11

## 2022-11-21 RX ADMIN — HYDROCODONE BITARTRATE AND ACETAMINOPHEN 1 TABLET: 5; 325 TABLET ORAL at 05:38

## 2022-11-21 RX ADMIN — INSULIN ASPART 4 UNITS: 100 INJECTION, SOLUTION INTRAVENOUS; SUBCUTANEOUS at 12:11

## 2022-11-21 RX ADMIN — DOCUSATE SODIUM 100 MG: 100 CAPSULE ORAL at 08:39

## 2022-11-21 RX ADMIN — INSULIN ASPART 5 UNITS: 100 INJECTION, SOLUTION INTRAVENOUS; SUBCUTANEOUS at 08:53

## 2022-11-21 RX ADMIN — FONDAPARINUX SODIUM 2.5 MG: 2.5 INJECTION, SOLUTION SUBCUTANEOUS at 08:40

## 2022-11-21 RX ADMIN — INSULIN DETEMIR 22 UNITS: 100 INJECTION, SOLUTION SUBCUTANEOUS at 22:00

## 2022-11-21 NOTE — PROGRESS NOTES
PROGRESS NOTE         Patient Identification:  Name:  Melchor Hardwick III  Age:  57 y.o.  Sex:  male  :  1965  MRN:  [id number]  Visit Number:  [id number]  Primary Care Provider:  Missy Up APRN         LOS: 24 days       ----------------------------------------------------------------------------------------------------------------------  Subjective       Chief Complaints:    No chief complaint on file.        Interval History:      The patient is sitting up in a wheelchair on room air in no apparent distress.  Complains of some right knee pain and stiffness and right knee is noted to be more erythematous and warm.  Lungs are clear to auscultation bilaterally.  Abdomen is soft, nontender, with normal bowel sounds.  Febrile overnight with a T-max of 100.9 °F and 101.8 °F this morning, per nurse's report.  No diarrhea.  WBC remains normal.  CRP on 2022 was slightly improved at 7.32.  CPK on 2022 was normal at 23.  Urinalysis on 2022 was negative.  Chest x-ray on 2022 showed redemonstrated elevation of the right hemidiaphragm with mild right basilar atelectasis/infiltrate.  Blood cultures on 2022 are in progress.    Review of Systems:    Constitutional: no fever, chills and night sweats.  Generalized fatigue.  Eyes: no eye drainage, itching or redness.  HEENT: no mouth sores, dysphagia or nose bleed.  Respiratory: no for shortness of breath, cough or production of sputum.  Cardiovascular: no chest pain, no palpitations, no orthopnea.  Gastrointestinal: no nausea, vomiting or diarrhea. No abdominal pain, hematemesis or rectal bleeding.  Genitourinary: no dysuria or polyuria.  Hematologic/lymphatic: no lymph node abnormalities, no easy bruising or easy bleeding.  Musculoskeletal: no muscle or joint pain.  Right knee stiffness and pain. Chronic back spasms.  Skin: No rash and no itching.  Neurological: no loss of consciousness, no seizure, no  headache.  Behavioral/Psych: no depression or suicidal ideation.  Endocrine: no hot flashes.  Immunologic: negative.    ----------------------------------------------------------------------------------------------------------------------      Objective       [facility] Meds:  ascorbic acid, 500 mg, Oral, Daily  aspirin, 81 mg, Oral, Daily  DAPTOmycin, 6 mg/kg (Adjusted), Intravenous, Q24H  Diclofenac Sodium, 4 g, Topical, 4x Daily  docusate sodium, 100 mg, Oral, BID  fludrocortisone, 50 mcg, Oral, Daily  fondaparinux, 2.5 mg, Subcutaneous, Q24H  Insulin Aspart, 0-14 Units, Subcutaneous, TID AC  insulin detemir, 18 Units, Subcutaneous, Q12H  iron polysaccharides, 150 mg, Oral, Daily  levothyroxine, 25 mcg, Oral, Q AM  lidocaine PF 2%, 10 mL, Injection, Once  midodrine, 10 mg, Oral, TID AC  multivitamin with minerals, 1 tablet, Oral, Daily  pantoprazole, 40 mg, Oral, Q AM  polyethylene glycol, 17 g, Oral, Daily  pregabalin, 100 mg, Oral, Q12H      Pharmacy Consult,       ----------------------------------------------------------------------------------------------------------------------    Vital Signs:  Temp:  [98.4 °F (36.9 °C)-100.9 °F (38.3 °C)] 98.4 °F (36.9 °C)  Heart Rate:  [86-92] 86  Resp:  [18] 18  BP: (121-139)/(69-75) 139/70  No data found.  SpO2 Percentage    11/20/22 0556 11/20/22 0910 11/20/22 1900   SpO2: 96% 93% 98%     SpO2:  [98 %] 98 %  on   ;   Device (Oxygen Therapy): room air    Body mass index is 28.34 kg/m².  Wt Readings from Last 3 Encounters:   11/01/22 89.6 kg (197 lb 8.5 oz)   09/23/22 98 kg (216 lb)   06/17/22 115 kg (254 lb)        Intake/Output Summary (Last 24 hours) at 11/21/2022 0940  Last data filed at 11/21/2022 0500  Gross per 24 hour   Intake 840 ml   Output 550 ml   Net 290 ml     Diet Regular Texture (IDDSI 7); Regular Consistency; Diabetic Diets; Consistent  Carbohydrate  ----------------------------------------------------------------------------------------------------------------------      Physical Exam:    Constitutional: Chronically ill-appearing male is sitting up in a wheelchair on room air in no apparent distress.  HENT:  Head: Normocephalic and atraumatic.  Mouth:  Moist mucous membranes.    Cardiovascular:  Normal rate, regular rhythm and normal heart sounds with no murmur. No edema.  Pulmonary/Chest:  No respiratory distress, no wheezes, no crackles, with normal breath sounds and good air movement.  Abdominal:  Soft.  Bowel sounds are normal.  No distension and no tenderness.   Musculoskeletal:  No edema, no tenderness, and no deformity.  No swelling or redness of joints.  Neurological:  Alert and oriented to person, place, and time.  No facial droop.  No slurred speech.   Skin:  Skin is warm and dry.  No rash noted.  No pallor.  Right knee surgical incision with good healing and staples removed.  Erythema and effusion of the right knee.  Worsened edema of right lower extremity, particularly around the knee.  Psychiatric:  Normal mood and affect.  Behavior is normal.    ----------------------------------------------------------------------------------------------------------------------  Results from last 7 days   Lab Units 11/20/22  2358   CK TOTAL U/L 23           Results from last 7 days   Lab Units 11/21/22  0723 11/20/22  2358 11/20/22  0452 11/18/22  0300 11/17/22  0131   CRP mg/dL  --  7.32*  --  8.38* 6.73*   WBC 10*3/mm3 3.59 3.79 3.40 2.84* 3.11*   HEMOGLOBIN g/dL 10.3* 9.1* 9.6* 9.1* 8.7*   HEMATOCRIT % 33.2* 29.3* 31.0* 29.4* 27.4*   MCV fL 84.1 81.8 83.3 83.3 82.8   MCHC g/dL 31.0* 31.1* 31.0* 31.0* 31.8   PLATELETS 10*3/mm3 126* 119* 126* 104* 109*   INR   --   --   --  1.18*  --      Results from last 7 days   Lab Units 11/20/22  2358 11/20/22  0452 11/18/22  0300   SODIUM mmol/L 131* 135* 132*   POTASSIUM mmol/L 4.0 3.8 4.0   CHLORIDE mmol/L  99 100 99   CO2 mmol/L 22.5 25.5 24.5   BUN mg/dL 9 8 11   CREATININE mg/dL 0.64* 0.57* 0.71*   CALCIUM mg/dL 7.9* 8.2* 8.3*   GLUCOSE mg/dL 291* 139* 199*   Estimated Creatinine Clearance: 143.4 mL/min (A) (by C-G formula based on SCr of 0.64 mg/dL (L)).  No results found for: AMMONIA    Glucose   Date/Time Value Ref Range Status   11/21/2022 0626 219 (H) 70 - 130 mg/dL Final     Comment:     Meter: [id number] : [id number] Susi Velez   11/20/2022 1603 260 (H) 70 - 130 mg/dL Final     Comment:     Meter: [id number] : [id number] Audrey Mcginnis   11/20/2022 1042 289 (H) 70 - 130 mg/dL Final     Comment:     Meter: [id number] : [id number] Audrey Mcginnis   11/20/2022 0604 143 (H) 70 - 130 mg/dL Final     Comment:     Meter: [id number] : [id number] Lindon Crystal   11/19/2022 1608 220 (H) 70 - 130 mg/dL Final     Comment:     Meter: [id number] : [id number] lisa ashley   11/19/2022 1109 237 (H) 70 - 130 mg/dL Final     Comment:     Meter: [id number] : [id number] lisa ashley   11/19/2022 0621 121 70 - 130 mg/dL Final     Comment:     Meter: [id number] : [id number] Tracey Crystal   11/18/2022 1558 251 (H) 70 - 130 mg/dL Final     Comment:     Meter: [id number] : [id number] lisa ashley     Lab Results   Component Value Date    HGBA1C 8.80 (H) 09/20/2022     Lab Results   Component Value Date    TSH 2.390 10/10/2022    FREET4 0.87 (L) 10/10/2022       Blood Culture   Date Value Ref Range Status   10/24/2022 No growth at 5 days  Final   10/24/2022 No growth at 5 days  Final     No results found for: URINECX  No results found for: WOUNDCX  No results found for: STOOLCX  No results found for: RESPCX  Pain Management Panel     Pain Management Panel Latest Ref Rng & Units 5/24/2022 5/11/2022    CREATININE UR mg/dL 91.0 -    AMPHETAMINES SCREEN, URINE Negative - Negative    BARBITURATES SCREEN Negative - Negative    BENZODIAZEPINE SCREEN, URINE Negative - Negative    BUPRENORPHINEUR  Negative - Negative    COCAINE SCREEN, URINE Negative - Negative    METHADONE SCREEN, URINE Negative - Negative    METHAMPHETAMINEUR Negative - Negative            ----------------------------------------------------------------------------------------------------------------------  Imaging Results (Last 24 Hours)     Procedure Component Value Units Date/Time    XR Chest 1 View [[id number]] Collected: 11/21/22 0022     Updated: 11/21/22 0024    Narrative:      CR Chest 1 Vw    INDICATION:   Fever     COMPARISON:    Chest 10/18/2022    FINDINGS:  Portable AP view(s) of the chest.    Interval removal of the right-sided PICC line.    The heart and mediastinal contours are normal. Redemonstrated elevation of the right hemidiaphragm with mild right basilar atelectasis/infiltrate. Left lung is clear. No pneumothorax or pleural effusion.       Impression:      Redemonstrated elevation of the right hemidiaphragm with mild right basilar atelectasis/infiltrate.    Signer Name: Esa Sanchez MD   Signed: 11/21/2022 12:22 AM   Workstation Name: [email or web address]    [facility]          ----------------------------------------------------------------------------------------------------------------------    Pertinent Infectious Disease Results     COVID-19 PCR from 10/28/2022 negative.  Blood cultures from 10/18/2022 2 out of 2 sets positive for MRSA.  Blood cultures from 10/20/2022 2 out of 2 sets positive for MRSA.  Blood cultures from 10/22/2022 1 out of 2 sets positive for MRSA.  Blood cultures from 10/24/2022 finalized as no growth.  2D echo from 10/24/2022 reports no evidence of vegetation.  Updated TYRESE on 11/1/2022 showed a moderate sized echodense structure on the posterior tricuspid leaflet but not attached to the leaflet.  The appearance and location are not typical for regurgitation.  This structure could be a normal variant.  No evidence of tricuspid valve stenosis or significant regurgitations.   Other valves also appear normal in structure with no evidence of stenosis or significant regurgitations.  No vegetation seen on these valves. Right lower extremity venous duplex negative for DVT.  Bilateral upper extremity venous duplex negative for DVT.     CT right knee without contrast on 11/17/2022 showed a large joint effusion again.  Suprapatellar region collection appears slightly larger now measuring 10.3 x 3.3 cm in axial dimension and was 8.8 x 2.8 cm.  The collection again shows heterogenous mixed density.  Increasing lytic lucency at the lateral tibial plateau could represent osteomyelitis.  Blood cultures on 11/12/2022 finalized as no growth.      Assessment/Plan       Assessment     Right knee septic arthritis  MRSA bacteremia  Possible endocarditis    Plan      I have seen and examined the patient myself this morning and discussed the plan of care with Sara Sales PA-C and here are my findings:    Patient is sitting up in his bed this morning on room air with no apparent distress continues with right knee pain and stiffness and had a fever overnight that seems to be getting worse with a T-max of 101.8 °F per nurses report but not yet documented.  No diarrhea reported by nursing staff.    Lungs are clear to auscultation and not suggestive of any evidence of clinical pneumonia despite the chest x-ray report abdomen is soft with no tenderness and no guarding and right knee continues to show worsening edema warmth and tenderness with worsening range of motion.    CRP was ordered this morning stat and was worsening and was at 7.32 as of yesterday with normal CPK level yesterday at 23 with a negative urine analysis from this morning.  Chest x-ray with mid right basilar atelectasis versus infiltrate and blood cultures are in progress.    I continue to be concerned about the right knee worsening effusion recurrent fever and worsening warmth and range of motion to the right knee and continue to recommend  orthopedic evaluation for repeat washout in the OR with intraoperative cultures as soon as feasible.    For now would recommend to continue with daptomycin and I discussed the case with primary team Dr. Mike to okay delaying TYRESE while awaiting for orthopedic evaluation and knee washout.  Very difficult case and I will continue to follow on blood culture results.    Code Status:   Code Status and Medical Interventions:   Ordered at: 11/03/22 1015     Level Of Support Discussed With:    Patient     Code Status (Patient has no pulse and is not breathing):    CPR (Attempt to Resuscitate)     Medical Interventions (Patient has pulse or is breathing):    Full Support     Release to patient:    Routine Release       Sara Sales PA-C  11/21/22  09:40 EST     Electronically signed by Sara Sales PA-C, 11/21/22, 9:45 AM EST.      Electronically signed by Marilynn Giordano MD, 11/21/22, 10:22 AM EST.

## 2022-11-21 NOTE — PROGRESS NOTES
"Assisted By: Occupational therapy    CC: Follow-up on septic arthritis and bacteremia    Interview History/HPI: Patient states he feels \"okay\".  He has had some nausea, some body aches, he states he is not having a significant increase in his right knee pain but his range of motion he feels like he is decreasing denies chest pain or shortness of breath.          Current Hospital Meds:  ascorbic acid, 500 mg, Oral, Daily  aspirin, 81 mg, Oral, Daily  DAPTOmycin, 6 mg/kg (Adjusted), Intravenous, Q24H  Diclofenac Sodium, 4 g, Topical, 4x Daily  docusate sodium, 100 mg, Oral, BID  fludrocortisone, 50 mcg, Oral, Daily  fondaparinux, 2.5 mg, Subcutaneous, Q24H  Insulin Aspart, 0-14 Units, Subcutaneous, TID AC  insulin detemir, 18 Units, Subcutaneous, Q12H  iron polysaccharides, 150 mg, Oral, Daily  levothyroxine, 25 mcg, Oral, Q AM  lidocaine PF 2%, 10 mL, Injection, Once  midodrine, 10 mg, Oral, TID AC  multivitamin with minerals, 1 tablet, Oral, Daily  pantoprazole, 40 mg, Oral, Q AM  polyethylene glycol, 17 g, Oral, Daily  pregabalin, 100 mg, Oral, Q12H    Pharmacy Consult,         Vitals:    11/21/22 0044   BP:    Pulse:    Resp:    Temp: 98.4 °F (36.9 °C)   SpO2:          Intake/Output Summary (Last 24 hours) at 11/21/2022 1148  Last data filed at 11/21/2022 0500  Gross per 24 hour   Intake 840 ml   Output 550 ml   Net 290 ml       EXAM: T-max 100.9, 139/70, room air saturation 98%, patient is in no distress and working with occupational therapy.  His lungs are clear, heart regular rate and rhythm, his right knee incision is still well approximated but he does have an enlargement of the knee consistent with the effusion with some limited range of motion.  The knee is warm compared to the other knee.  No definite erythema seen.  No ankle edema is noted, a status post transmetatarsal amputation of the left foot and has a walking boot in place.  Strength overall appears symmetric.  He has an abdominal binder on which he " has been wearing for orthostasis.      Diet: Texture: Regular Texture (IDDSI 7); Fluid Consistency: Regular Consistency; Diabetic Diets; Consistent Carbohydrate        LABS:     Lab Results (last 48 hours)     Procedure Component Value Units Date/Time    POC Glucose Once [[id number]]  (Abnormal) Collected: 11/21/22 1030    Specimen: Blood Updated: 11/21/22 1036     Glucose 266 mg/dL      Comment: Meter: [id number] : [id number] Audrey Mcginnis       CBC & Differential [[id number]]  (Abnormal) Collected: 11/21/22 0723    Specimen: Blood Updated: 11/21/22 0808    Narrative:      The following orders were created for panel order CBC & Differential.  Procedure                               Abnormality         Status                     ---------                               -----------         ------                     CBC Auto Differential[[id number]]        Abnormal            Final result                 Please view results for these tests on the individual orders.    CBC Auto Differential [[id number]]  (Abnormal) Collected: 11/21/22 0723    Specimen: Blood Updated: 11/21/22 0808     WBC 3.59 10*3/mm3      RBC 3.95 10*6/mm3      Hemoglobin 10.3 g/dL      Hematocrit 33.2 %      MCV 84.1 fL      MCH 26.1 pg      MCHC 31.0 g/dL      RDW 16.4 %      RDW-SD 50.8 fl      MPV 10.2 fL      Platelets 126 10*3/mm3      Neutrophil % 64.4 %      Lymphocyte % 23.1 %      Monocyte % 10.0 %      Eosinophil % 1.9 %      Basophil % 0.3 %      Immature Grans % 0.3 %      Neutrophils, Absolute 2.31 10*3/mm3      Lymphocytes, Absolute 0.83 10*3/mm3      Monocytes, Absolute 0.36 10*3/mm3      Eosinophils, Absolute 0.07 10*3/mm3      Basophils, Absolute 0.01 10*3/mm3      Immature Grans, Absolute 0.01 10*3/mm3      nRBC 0.0 /100 WBC     CK [[id number]]  (Normal) Collected: 11/20/22 2358    Specimen: Blood Updated: 11/21/22 0721     Creatine Kinase 23 U/L     C-reactive Protein [[id number]]  (Abnormal) Collected: 11/20/22 2358    Specimen:  "Blood Updated: 11/21/22 0721     C-Reactive Protein 7.32 mg/dL     POC Glucose Once [[id number]]  (Abnormal) Collected: 11/21/22 0626    Specimen: Blood Updated: 11/21/22 0650     Glucose 219 mg/dL      Comment: Meter: [id number] : [id number] Susi Velez       Urinalysis With Culture If Indicated - Urine, Clean Catch [[id number]]  (Abnormal) Collected: 11/21/22 0341    Specimen: Urine, Clean Catch Updated: 11/21/22 0354     Color, UA Yellow     Appearance, UA Clear     pH, UA 6.5     Specific Gravity, UA 1.010     Glucose,  mg/dL (2+)     Ketones, UA Negative     Bilirubin, UA Negative     Blood, UA Negative     Protein, UA Negative     Leuk Esterase, UA Negative     Nitrite, UA Negative     Urobilinogen, UA 1.0 E.U./dL    Narrative:      In absence of clinical symptoms, the presence of pyuria, bacteria, and/or nitrites on the urinalysis result does not correlate with infection.  Urine microscopic not indicated.    Procalcitonin [[id number]]  (Normal) Collected: 11/20/22 2358    Specimen: Blood Updated: 11/21/22 0100     Procalcitonin 0.18 ng/mL     Narrative:      As a Marker for Sepsis (Non-Neonates):    1. <0.5 ng/mL represents a low risk of severe sepsis and/or septic shock.  2. >2 ng/mL represents a high risk of severe sepsis and/or septic shock.    As a Marker for Lower Respiratory Tract Infections that require antibiotic therapy:    PCT on Admission    Antibiotic Therapy       6-12 Hrs later    >0.5                Strongly Recommended  >0.25 - <0.5        Recommended   0.1 - 0.25          Discouraged              Remeasure/reassess PCT  <0.1                Strongly Discouraged     Remeasure/reassess PCT    As 28 day mortality risk marker: \"Change in Procalcitonin Result\" (>80% or <=80%) if Day 0 (or Day 1) and Day 4 values are available. Refer to http://www.[employer]-pct-calculator.com    Change in PCT <=80%  A decrease of PCT levels below or equal to 80% defines a positive change in PCT test result " representing a higher risk for 28-day all-cause mortality of patients diagnosed with severe sepsis for septic shock.    Change in PCT >80%  A decrease of PCT levels of more than 80% defines a negative change in PCT result representing a lower risk for 28-day all-cause mortality of patients diagnosed with severe sepsis or septic shock.       Basic Metabolic Panel [[id number]]  (Abnormal) Collected: 11/20/22 2358    Specimen: Blood Updated: 11/21/22 0051     Glucose 291 mg/dL      BUN 9 mg/dL      Creatinine 0.64 mg/dL      Sodium 131 mmol/L      Potassium 4.0 mmol/L      Chloride 99 mmol/L      CO2 22.5 mmol/L      Calcium 7.9 mg/dL      BUN/Creatinine Ratio 14.1     Anion Gap 9.5 mmol/L      eGFR 110.4 mL/min/1.73      Comment: National Kidney Foundation and American Society of Nephrology (ASN) Task Force recommended calculation based on the Chronic Kidney Disease Epidemiology Collaboration (CKD-EPI) equation refit without adjustment for race.       Narrative:      GFR Normal >60  Chronic Kidney Disease <60  Kidney Failure <15      CBC & Differential [[id number]]  (Abnormal) Collected: 11/20/22 2358    Specimen: Blood Updated: 11/21/22 0030    Narrative:      The following orders were created for panel order CBC & Differential.  Procedure                               Abnormality         Status                     ---------                               -----------         ------                     CBC Auto Differential[[id number]]        Abnormal            Final result                 Please view results for these tests on the individual orders.    CBC Auto Differential [[id number]]  (Abnormal) Collected: 11/20/22 2358    Specimen: Blood Updated: 11/21/22 0030     WBC 3.79 10*3/mm3      RBC 3.58 10*6/mm3      Hemoglobin 9.1 g/dL      Hematocrit 29.3 %      MCV 81.8 fL      MCH 25.4 pg      MCHC 31.1 g/dL      RDW 16.5 %      RDW-SD 49.5 fl      MPV 10.3 fL      Platelets 119 10*3/mm3      Neutrophil % 66.5 %       Lymphocyte % 22.4 %      Monocyte % 9.2 %      Eosinophil % 1.3 %      Basophil % 0.3 %      Immature Grans % 0.3 %      Neutrophils, Absolute 2.52 10*3/mm3      Lymphocytes, Absolute 0.85 10*3/mm3      Monocytes, Absolute 0.35 10*3/mm3      Eosinophils, Absolute 0.05 10*3/mm3      Basophils, Absolute 0.01 10*3/mm3      Immature Grans, Absolute 0.01 10*3/mm3      nRBC 0.0 /100 WBC     Blood Culture - Blood, Hand, Left [[id number]] Collected: 11/20/22 2355    Specimen: Blood from Hand, Left Updated: 11/21/22 0025    Blood Culture - Blood, Arm, Left [[id number]] Collected: 11/20/22 2350    Specimen: Blood from Arm, Left Updated: 11/21/22 0025    COVID PRE-OP / PRE-PROCEDURE SCREENING ORDER (NO ISOLATION) - Swab, Nasopharynx [[id number]]  (Normal) Collected: 11/20/22 2307    Specimen: Swab from Nasopharynx Updated: 11/20/22 2332    Narrative:      The following orders were created for panel order COVID PRE-OP / PRE-PROCEDURE SCREENING ORDER (NO ISOLATION) - Swab, Nasopharynx.  Procedure                               Abnormality         Status                     ---------                               -----------         ------                     COVID-19 and FLU A/B PCR...[[id number]]  Normal              Final result                 Please view results for these tests on the individual orders.    COVID-19 and FLU A/B PCR - Swab, Nasopharynx [[id number]]  (Normal) Collected: 11/20/22 2307    Specimen: Swab from Nasopharynx Updated: 11/20/22 2332     COVID19 Not Detected     Influenza A PCR Not Detected     Influenza B PCR Not Detected    Narrative:      Fact sheet for providers: https://www.fda.gov/media/411503/download    Fact sheet for patients: https://www.fda.gov/media/260399/download    Test performed by PCR.    POC Glucose Once [[id number]]  (Abnormal) Collected: 11/20/22 1603    Specimen: Blood Updated: 11/20/22 1619     Glucose 260 mg/dL      Comment: Meter: [id number] : [id number] Audrey Mcginnis       POC  Glucose Once [[id number]]  (Abnormal) Collected: 11/20/22 1042    Specimen: Blood Updated: 11/20/22 1048     Glucose 289 mg/dL      Comment: Meter: [id number] : [id number] Audrey Mcginnis       POC Glucose Once [[id number]]  (Abnormal) Collected: 11/20/22 0604    Specimen: Blood Updated: 11/20/22 0626     Glucose 143 mg/dL      Comment: Meter: [id number] : [id number] A[place]aho Crystal       Basic Metabolic Panel [[id number]]  (Abnormal) Collected: 11/20/22 0452    Specimen: Blood Updated: 11/20/22 0557     Glucose 139 mg/dL      BUN 8 mg/dL      Creatinine 0.57 mg/dL      Sodium 135 mmol/L      Potassium 3.8 mmol/L      Chloride 100 mmol/L      CO2 25.5 mmol/L      Calcium 8.2 mg/dL      BUN/Creatinine Ratio 14.0     Anion Gap 9.5 mmol/L      eGFR 114.4 mL/min/1.73      Comment: National Kidney Foundation and American Society of Nephrology (ASN) Task Force recommended calculation based on the Chronic Kidney Disease Epidemiology Collaboration (CKD-EPI) equation refit without adjustment for race.       Narrative:      GFR Normal >60  Chronic Kidney Disease <60  Kidney Failure <15      CBC & Differential [[id number]]  (Abnormal) Collected: 11/20/22 0452    Specimen: Blood Updated: 11/20/22 0538    Narrative:      The following orders were created for panel order CBC & Differential.  Procedure                               Abnormality         Status                     ---------                               -----------         ------                     CBC Auto Differential[[id number]]        Abnormal            Final result                 Please view results for these tests on the individual orders.    CBC Auto Differential [[id number]]  (Abnormal) Collected: 11/20/22 0452    Specimen: Blood Updated: 11/20/22 0538     WBC 3.40 10*3/mm3      RBC 3.72 10*6/mm3      Hemoglobin 9.6 g/dL      Hematocrit 31.0 %      MCV 83.3 fL      MCH 25.8 pg      MCHC 31.0 g/dL      RDW 16.6 %      RDW-SD 50.4 fl      MPV 10.2 fL       Platelets 126 10*3/mm3      Neutrophil % 67.6 %      Lymphocyte % 20.6 %      Monocyte % 8.8 %      Eosinophil % 2.4 %      Basophil % 0.3 %      Immature Grans % 0.3 %      Neutrophils, Absolute 2.30 10*3/mm3      Lymphocytes, Absolute 0.70 10*3/mm3      Monocytes, Absolute 0.30 10*3/mm3      Eosinophils, Absolute 0.08 10*3/mm3      Basophils, Absolute 0.01 10*3/mm3      Immature Grans, Absolute 0.01 10*3/mm3      nRBC 0.0 /100 WBC     POC Glucose Once [[id number]]  (Abnormal) Collected: 11/19/22 1608    Specimen: Blood Updated: 11/19/22 1635     Glucose 220 mg/dL      Comment: Meter: [id number] : [id number] lisa ramirez                  Radiology:    Imaging Results (Last 72 Hours)     Procedure Component Value Units Date/Time    XR Chest 1 View [[id number]] Collected: 11/21/22 0022     Updated: 11/21/22 0024    Narrative:      CR Chest 1 Vw    INDICATION:   Fever     COMPARISON:    Chest 10/18/2022    FINDINGS:  Portable AP view(s) of the chest.    Interval removal of the right-sided PICC line.    The heart and mediastinal contours are normal. Redemonstrated elevation of the right hemidiaphragm with mild right basilar atelectasis/infiltrate. Left lung is clear. No pneumothorax or pleural effusion.       Impression:      Redemonstrated elevation of the right hemidiaphragm with mild right basilar atelectasis/infiltrate.    Signer Name: Esa Sanchez MD   Signed: 11/21/2022 12:22 AM   Workstation Name: [facility]    [facility]          Results for orders placed during the hospital encounter of 10/28/22    Adult Transesophageal Echo (TYRESE) W/ Cont if Necessary Per Protocol    Interpretation Summary  •  Indication for TYRESE -to rule out infective endocarditis in a patient with MRSA bacteremia.  •  Findings-  •  Left ventricular systolic function is normal. Estimated left ventricular EF = 60%  •  Left atrial appendage is well visualized and is free of thrombus.  •  Saline test was negative  for the evidence of shunt in interatrial septum.  •  The tricuspid valve appeared normal in structure. There was a moderate sized echodense structure seen above  the posterior tricuspid leaflet but not attached to the leaflet. The appearance and location are not typical for regurgitation. This structure could be a normal variant. No evidence of tricuspid valve stenosis or significant regurgitations.  •  Other valves also appear normal in structure with no evidence of stenosis or significant regurgitations.  No vegetations seen on these valves.  •  There is no evidence of pericardial effusion.  •  Comment-  •  In view of presence of MRSA bacteremia, recommend to extend antibiotic therapy for possible infective endocarditis and repeat TYRESE in 3 months.      Assessment/Plan:   Debility, status post prolonged hospitalization from septic arthritis and bacteremia.  Patient is participating with therapy, with PT on 11/18 he was minimal assistance/modified independent with bed mobility, contact-guard with transfers, contact-guard with car transfer.  He walked 160 feet with a front wheeled rolling walker standby assist.    Septic arthritis, patient to go up and see his orthopedist tomorrow as arranged by Dr. Mike, patient's knee is warm, white count is normal, he did have an elevated temperature however, flu swab was negative, patient may require more operative intervention.  He is following, continue daptomycin, CPK normal yesterday repeat blood cultures were done around midnight and pending.    Diabetes, not well controlled, hyperglycemia noted, probably related to infectious process, left his basal and schedule some bolus insulin as well in addition to his sliding scale, will follow.    Hypothyroidism with adequate replacement    DVT prophylaxis, Arixtra, platelets stable.    Orthostasis, improved, I am going to begin midodrine and wean.  Continue Florinef currently.    Bowels moved yesterday.    Ja Bowers MD

## 2022-11-21 NOTE — THERAPY TREATMENT NOTE
Inpatient Rehabilitation - Occupational Therapy Treatment Note    YVONNE Gaines     Patient Name: Melchor Hardwick III  : 1965  MRN: [id number]    Today's Date: 2022                 Admit Date: 10/28/2022         ICD-10-CM ICD-9-CM   1. Staphylococcal arthritis of right knee (HCC)  M00.061 711.06     041.10       Patient Active Problem List   Diagnosis   • Hyperlipidemia   • Hypertensive disorder   • Obesity   • Type 2 diabetes mellitus with hyperglycemia, with long-term current use of insulin (HCC)   • Gas gangrene of foot (HCC)   • Cellulitis in diabetic foot (HCC)   • Other acute osteomyelitis of left foot (HCC)   • Osteomyelitis (HCC)   • Critical illness myopathy   • Staphylococcal arthritis of right knee (HCC)   • Other cirrhosis of liver (HCC)   • MRSA bacteremia   • Endocarditis of tricuspid valve   • Wound of right buttock   • History of osteomyelitis   • Debility       Past Medical History:   Diagnosis Date   • Diabetes mellitus (HCC)    • Hyperlipidemia    • Hypertension    • Pyogenic arthritis of right knee joint, due to unspecified organism (HCC) 2022       Past Surgical History:   Procedure Laterality Date   • ABSCESS DRAINAGE      PERINEUM   • AMPUTATION FOOT Left 2022    Procedure: AMPUTATION FOOT;  Surgeon: Addison Colby MD;  Location: [facility] OR;  Service: Podiatry;  Laterality: Left;   • INCISION AND DRAINAGE LEG Left 05/10/2022    Procedure: INCISION AND DRAINAGE LOWER EXTREMITY;  Surgeon: Addison Colby MD;  Location: [facility] OR;  Service: Podiatry;  Laterality: Left;   • KNEE INCISION AND DRAINAGE Right 2022    Procedure: KNEE INCISION AND DRAINAGE RIGHT;  Surgeon: Brain Bentley MD;  Location: [facility] OR;  Service: Orthopedics;  Laterality: Right;   • WOUND DEBRIDEMENT Left 2022    Procedure: DEBRIDEMENT FOOT;  Surgeon: Addison Colby MD;  Location: [facility] OR;  Service: Podiatry;  Laterality: Left;             IRF OT ASSESSMENT FLOWSHEET (last 12 hours)      IRF OT Evaluation and Treatment     Row Name 11/21/22 1434          OT Time and Intention    Document Type daily treatment  -     Mode of Treatment individual therapy;occupational therapy  -     Total Minutes, Occupational Therapy 90  -KP     Patient Effort good  -     Symptoms Noted During/After Treatment none  -     Row Name 11/21/22 1434          General Information    Patient Profile Reviewed yes  -     General Observations of Patient Patient agreeable to therapy with no complaints.  -     Existing Precautions/Restrictions fall;brace worn when out of bed  -     Row Name 11/21/22 1434          Pain Assessment    Pretreatment Pain Rating 0/10 - no pain  -     Posttreatment Pain Rating 0/10 - no pain  -[facility] Name 11/21/22 1434          Cognition/Psychosocial    Affect/Mental Status (Cognition) [facility]  -     Orientation Status (Cognition) oriented x 4  -     Follows Commands (Cognition) [facility]  -     Personal Safety Interventions fall prevention program maintained;gait belt;nonskid shoes/slippers when out of bed  -     Cognitive Function WFL  -     Row Name 11/21/22 1434          Lower Body Dressing    [employer] [place] [employer] Level (Lower Body Dressing) don;shoes/slippers;socks;minimum assist (75% patient effort)  -     Assistive Device Use (Lower Body Dressing) reacher  -     Row Name 11/21/22 1434          Bed-Chair Transfer    Bed-Chair [place] [employer] (Transfers) contact guard;standby assist  -     Assistive Device (Bed-Chair Transfers) walker, front-wheeled  [facility] Name 11/21/22 1434          Chair-Bed Transfer    Chair-Bed [place] [employer] (Transfers) contact guard;standby assist  -     Assistive Device (Chair-Bed Transfers) wheelchair;walker, front-wheeled  -[facility] Name 11/21/22 1434          Motor Skills    Functional Endurance fair plus  -     Motor Control/Coordination Interventions therapeutic exercise/ROM  BUE PRE X20 minutes, sustained tabletop functional reach activities for  he[place], [place] 25 lbs X25 X4  -     Row Name 11/21/22 1434          Positioning and Restraints    Pre-Treatment Position in bed  -     Post Treatment Position bed  -     In Bed call light within reach  -     Row Name 11/21/22 1434          Daily Progress Summary (OT)    Overall Progress Toward Functional Goals (OT) progressing toward functional goals as expected  -           User Key  (r) = Recorded By, (t) = Taken By, (c) = Cosigned By    Initials Name Effective Dates     Darcy Gramajo, OT 06/16/21 -                  Occupational Therapy Education     Title: PT OT SLP Therapies (Done)     Topic: Occupational Therapy (Done)     Point: ADL training (Done)     Description:   Instruct learner(s) on proper safety adaptation and remediation techniques during self care or transfers.   Instruct in proper use of assistive devices.              Learning Progress Summary           Patient Acceptance, E, VU,NR by HB at 11/18/2022 1211    Acceptance, E,TB,D, VU,DU,NR by LA at 11/17/2022 1423    Acceptance, E,TB, VU by DL at 11/17/2022 0233    Acceptance, E, VU,NR by HB at 11/16/2022 1425    Acceptance, E,TB, VU by DL at 11/15/2022 2033    Acceptance, E, VU,NR by HB at 11/15/2022 1242    Acceptance, E,TB, VU by DL at 11/15/2022 0305    Acceptance, E,TB,D, VU,DU,NR by LA at 11/11/2022 1248    Acceptance, E, VU,NR by BF at 11/10/2022 1431    Acceptance, E, VU,NR by HB at 11/9/2022 1230    Acceptance, E,TB, VU by DG at 11/9/2022 0050    Acceptance, E, VU,NR by HB at 11/8/2022 1426    Acceptance, E,TB, VU by DG at 11/8/2022 0114    Acceptance, E, VU,NR by HB at 11/7/2022 1155    Acceptance, E,TB, VU by DG at 11/6/2022 2023    Acceptance, E, VU,NR by HB at 11/4/2022 1153    Acceptance, E, VU,NR by HB at 11/3/2022 1428    Acceptance, E,TB, VU by DG at 11/1/2022 2016    Acceptance, E,TB, VU by BRYSON at 10/31/2022 2315    Acceptance, E, VU,NR by BF at 10/31/2022 1429    Acceptance, E,TB, VU by DL at 10/31/2022 0101     Acceptance, E,TB, VU by DL at 10/29/2022 2321    Acceptance, E, VU,NR by HB at 10/29/2022 1137                   Point: Home exercise program (Done)     Description:   Instruct learner(s) on appropriate technique for monitoring, assisting and/or progressing therapeutic exercises/activities.              Learning Progress Summary           Patient Acceptance, E, VU,NR by HB at 11/18/2022 1211    Acceptance, E,TB,D, VU,DU,NR by LA at 11/17/2022 1423    Acceptance, E,TB, VU by DL at 11/17/2022 0233    Acceptance, E, VU,NR by HB at 11/16/2022 1425    Acceptance, E,TB, VU by DL at 11/15/2022 2033    Acceptance, E, VU,NR by HB at 11/15/2022 1242    Acceptance, E,TB, VU by DL at 11/15/2022 0305    Acceptance, E,TB,D, VU,DU,NR by LA at 11/11/2022 1248    Acceptance, E, VU,NR by BF at 11/10/2022 1431    Acceptance, E, VU,NR by HB at 11/9/2022 1230    Acceptance, E,TB, VU by DG at 11/9/2022 0050    Acceptance, E, VU,NR by HB at 11/8/2022 1426    Acceptance, E,TB, VU by DG at 11/8/2022 0114    Acceptance, E, VU,NR by HB at 11/7/2022 1155    Acceptance, E,TB, VU by DG at 11/6/2022 2023    Acceptance, E, VU,NR by HB at 11/4/2022 1153    Acceptance, E, VU,NR by HB at 11/3/2022 1428    Acceptance, E,TB, VU by DG at 11/1/2022 2016    Acceptance, E,TB, VU by DG at 10/31/2022 2315    Acceptance, E,TB, VU by DL at 10/31/2022 0101    Acceptance, E,TB, VU by DL at 10/29/2022 2321    Acceptance, E, VU,NR by HB at 10/29/2022 1137                   Point: Precautions (Done)     Description:   Instruct learner(s) on prescribed precautions during self-care and functional transfers.              Learning Progress Summary           Patient Acceptance, E, VU,NR by HB at 11/18/2022 1211    Acceptance, E,TB,D, VU,DU,NR by LA at 11/17/2022 1423    Acceptance, E,TB, VU by DL at 11/17/2022 0233    Acceptance, E, VU,NR by HB at 11/16/2022 1425    Acceptance, E,TB, VU by DL at 11/15/2022 2033    Acceptance, E, VU,NR by HB at 11/15/2022 1242     Acceptance, E,TB, VU by DL at 11/15/2022 0305    Acceptance, E,TB,D, VU,DU,NR by LA at 11/11/2022 1248    Acceptance, E, VU,NR by HB at 11/9/2022 1230    Acceptance, E,TB, VU by DG at 11/9/2022 0050    Acceptance, E, VU,NR by HB at 11/8/2022 1426    Acceptance, E,TB, VU by DG at 11/8/2022 0114    Acceptance, E, VU,NR by HB at 11/7/2022 1155    Acceptance, E,TB, VU by DG at 11/6/2022 2023    Acceptance, E, VU,NR by HB at 11/4/2022 1153    Acceptance, E, VU,NR by HB at 11/3/2022 1428    Acceptance, E,TB, VU by DG at 11/1/2022 2016    Acceptance, E,TB, VU by DG at 10/31/2022 2315    Acceptance, E, VU,NR by BF at 10/31/2022 1429    Acceptance, E,TB, VU by DL at 10/31/2022 0101    Acceptance, E,TB, VU by DL at 10/29/2022 2321    Acceptance, E, VU,NR by HB at 10/29/2022 1137                   Point: Body mechanics (Done)     Description:   Instruct learner(s) on proper positioning and spine alignment during self-care, functional mobility activities and/or exercises.              Learning Progress Summary           Patient Acceptance, E, VU,NR by HB at 11/18/2022 1211    Acceptance, E,TB,D, VU,DU,NR by LA at 11/17/2022 1423    Acceptance, E,TB, VU by DL at 11/17/2022 0233    Acceptance, E, VU,NR by HB at 11/16/2022 1425    Acceptance, E,TB, VU by DL at 11/15/2022 2033    Acceptance, E, VU,NR by HB at 11/15/2022 1242    Acceptance, E,TB, VU by DL at 11/15/2022 0305    Acceptance, E,TB,D, VU,DU,NR by LA at 11/11/2022 1248    Acceptance, E, VU,NR by HB at 11/9/2022 1230    Acceptance, E,TB, VU by DG at 11/9/2022 0050    Acceptance, E, VU,NR by HB at 11/8/2022 1426    Acceptance, E,TB, VU by DG at 11/8/2022 0114    Acceptance, E, VU,NR by HB at 11/7/2022 1155    Acceptance, E,TB, VU by DG at 11/6/2022 2023    Acceptance, E, VU,NR by HB at 11/4/2022 1153    Acceptance, E, VU,NR by HB at 11/3/2022 1428    Acceptance, E,TB, VU by DG at 11/1/2022 2016    Acceptance, E,TB, VU by DG at 10/31/2022 2315    Acceptance, E,TB, VU by DL  at 10/31/2022 0101    Acceptance, E,TB, VU by DL at 10/29/2022 2321    Acceptance, E, VU,NR by HB at 10/29/2022 1137                               User Key     Initials Effective Dates Name Provider Type Discipline    DG 06/16/21 -  Phyllis Yañez, RN Registered Nurse Nurse    BF 06/16/21 -  Mandi Smith OT Occupational Therapist OT    HB 05/25/21 -  Dorothy Rosas OT Occupational Therapist OT    LA 02/14/22 -  Jaqueline Bautista OT Occupational Therapist OT    DL 03/16/22 -  Hellen Mcdonnell, RN Registered Nurse Nurse                    OT Recommendation and Plan            Daily Progress Summary (OT)  Overall Progress Toward Functional Goals (OT): progressing toward functional goals as expected            Time Calculation:      Time Calculation- OT     Row Name 11/21/22 1442 11/21/22 1440          Time Calculation- OT    OT Start Time 0800  -KP 1415  -KP     OT Stop Time 0915  -KP 1430  -KP     OT Time Calculation (min) 75 min  -KP 15 min  -KP     Total Timed Code Minutes- OT 75 minute(s)  -KP 15 minute(s)  -KP           User Key  (r) = Recorded By, (t) = Taken By, (c) = Cosigned By    Initials Name Provider Type     Darcy Gramajo OT Occupational Therapist              Therapy Charges for Today     Code Description Service Date Service Provider Modifiers Qty    81428773201 HC OT SELF CARE/MGMT/TRAIN EA 15 MIN 11/21/2022 Darcy Gramajo OT GO 1    55372353645 HC OT THERAPEUTIC ACT EA 15 MIN 11/21/2022 Darcy Gramajo OT GO 2    07732999427 HC OT THER PROC EA 15 MIN 11/21/2022 Darcy Gramajo OT GO 3                   Darcy Gramajo OT  11/21/2022

## 2022-11-21 NOTE — SIGNIFICANT NOTE
11/21/22 0940   Plan   Plan Pt has an appointment on 11-22-22 at 3:15 pm with Dr. Brain Bentley, Orthopedic Surgeon, in [place].  Spoke to sister Ros [phone] who is agreeable to come to rehab around 12:45 pm with her spouse and they will transport pt to this appointment.  Sister aware Orthopedic Surgeon will decide how to proceed with pt's treatment at this appointment.  Sister and brother-in-law to bring pt back to rehab if he does not need admission to hospital in [place] for Orthopedic Surgery.  SS to assist with discharge planning.   Patient/Family in Agreement with Plan yes

## 2022-11-21 NOTE — PAYOR COMM NOTE
"Jaja Ramirez, RN  Utilization Review Nurse for Inpatient Rehab  Phone: [phone]  Fax: [phone]  Email: [email or web address]  [facility]  Facility NPI: [id number]    CLINICAL REVIEW FOR CONTINUED REHAB STAY APPROVAL  Member: Melchor Hardwick  : 1965  Ref# [id number]  ID# [id number]  Admission Date: 10/28/22  Discharge date is pending medical at this time  *Requesting additional 7 days    Melchor Hardwick III (57 y.o. Male)     Date of Birth   1965    Social Security Number       Address   [address] [address]    Home Phone   [phone]    MRN   [id number]       [place]   [facility]    Marital Status   Single                            Admission Date   10/28/22    Admission Type   Elective    Admitting Provider   Ja Bowers MD    Attending Provider   Ja Bowers MD    Department, Room/Bed   [facility] REHABILITATION, 109/2S       Discharge Date       Discharge Disposition       Discharge Destination                               Attending Provider: Ja Bowers MD    Allergies: No Known Allergies    Isolation: None   Infection: MRSA/History Only (10/17/22)   Code Status: CPR    Ht: 177.8 cm (70\")   Wt: 89.6 kg (197 lb 8.5 oz)    Admission Cmt: None   Principal Problem: Debility [R53.81]               Corrina Mendez MSW     Case Management  Significant Note     Signed  Date of Service:  22  Creation Time:  22     Signed                         22   Plan   Plan Pt's sister Ros came today for education with PT/OT.  SS and PTA spoke to sister who voiced she is unable to care for pt at her home at this time.  Sister has talked to pt about going to SNF and privately paying for stay due to his insurance not covering skilled nursing care.  Sister aware of seperate charges for room and board, medications and therapy services at a SNF and she says pt is aware of this too.  Sister has informed pt " and he recognizes that going to SNF is his option.  Pt has right knee effusion and needs aspiration today.  SS can assist with SNF when pt is medically stable.  Will follow.   Patient/Family in Agreement with Plan yes                 Joe Mike MD   Physician  Medicine  Progress Notes     Signed  Date of Service:  22  Creation Time:  22     Signed        Expand All Collapse All     [facility]  PROGRESS NOTE     Patient Identification:  Name:  Melchor Hardwick III  Age:  57 y.o.  Sex:  male  :  1965  MRN:  [id number]  Visit Number:  [id number]  ROOM: 109/      Primary Care Provider:  Missy Up APRN     Length of stay in inpatient status:  23     Subjective      Chief Compliant:  No chief complaint on file.        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  Patient has recurrence of knee effusion and has findings consistent with osteomyelitis on the tibial plateau per CT.  Patient states he has no increase in soreness today.  Patient is scheduled to see Ortho at [facility] in [place] on Tuesday of this week.  He will see him as an outpatient and joint aspiration will be done at that appointment per my conversation with Dr. Lopez.  He will later decide how to proceed     Objective      Current Hospital Meds:ascorbic acid, 500 mg, Oral, Daily  aspirin, 81 mg, Oral, Daily  DAPTOmycin, 6 mg/kg (Adjusted), Intravenous, Q24H  Diclofenac Sodium, 4 g, Topical, 4x Daily  docusate sodium, 100 mg, Oral, BID  fludrocortisone, 50 mcg, Oral, Daily  fondaparinux, 2.5 mg, Subcutaneous, Q24H  Insulin Aspart, 0-14 Units, Subcutaneous, TID AC  insulin detemir, 18 Units, Subcutaneous, Q12H  iron polysaccharides, 150 mg, Oral, Daily  levothyroxine, 25 mcg, Oral, Q AM  lidocaine PF 2%, 10 mL, Injection, Once  midodrine, 10 mg, Oral, TID AC  multivitamin with minerals, 1 tablet, Oral,  Daily  pantoprazole, 40 mg, Oral, Q AM  polyethylene glycol, 17 g, Oral, Daily  pregabalin, 100 mg, Oral, Q12H     Pharmacy Consult,         ----------------------------------------------------------------------------------------------------------------------  Vital Signs:  Temp:  [99 °F (37.2 °C)-99.7 °F (37.6 °C)] 99 °F (37.2 °C)  Heart Rate:  [79-92] 92  Resp:  [18] 18  BP: (114-128)/(59-69) 123/69  SpO2:  [93 %-96 %] 93 %  on   ;   Device (Oxygen Therapy): room air  Body mass index is 28.34 kg/m².         Wt Readings from Last 3 Encounters:   11/01/22 89.6 kg (197 lb 8.5 oz)   09/23/22 98 kg (216 lb)   06/17/22 115 kg (254 lb)               Intake & Output (last 3 days)        11/17 0701  11/18 0700 11/18 0701  11/19 0700 11/19 0701  11/20 0700 11/20 0701  11/21 0700     P.O. 1320 1800 1800       Total Intake(mL/kg) 1320 (14.7) 1800 (20.1) 1800 (20.1)       Urine (mL/kg/hr) 3925 (1.8) 3250 (1.5) 3000 (1.4) 300 (0.6)     Stool       0     Total Output 3925 3250 3000 300     Net -2605 -1450 -1200 -300                   Urine Unmeasured Occurrence   1 x 5 x       Stool Unmeasured Occurrence       1 x          Diet Regular Texture (IDDSI 7); Regular Consistency; Diabetic Diets; Consistent Carbohydrate  ----------------------------------------------------------------------------------------------------------------------  Physical exam:  Constitutional:   No acute distress  HEENT: Normocephalic atraumatic  Neck:    Supple  Cardiovascular: Regular rate and rhythm  Pulmonary/Chest: Clear to auscultation  Abdominal: Positive bowel sounds soft.   Musculoskeletal: Right knee--there is no erythema noted, does have right knee effusion.  No increased warmth noted  Neurological: No focal deficits  Skin: No rash  Peripheral vascular:  Genitourinary:  ----------------------------------------------------------------------------------------------------------------------     Last echocardiogram:  Results for orders placed during  the hospital encounter of 10/28/22     Adult Transesophageal Echo (TYRESE) W/ Cont if Necessary Per Protocol     Interpretation Summary  •  Indication for TYRESE -to rule out infective endocarditis in a patient with MRSA bacteremia.  •  Findings-  •  Left ventricular systolic function is normal. Estimated left ventricular EF = 60%  •  Left atrial appendage is well visualized and is free of thrombus.  •  Saline test was negative for the evidence of shunt in interatrial septum.  •  The tricuspid valve appeared normal in structure. There was a moderate sized echodense structure seen above  the posterior tricuspid leaflet but not attached to the leaflet. The appearance and location are not typical for regurgitation. This structure could be a normal variant. No evidence of tricuspid valve stenosis or significant regurgitations.  •  Other valves also appear normal in structure with no evidence of stenosis or significant regurgitations.  No vegetations seen on these valves.  •  There is no evidence of pericardial effusion.  •  Comment-  •  In view of presence of MRSA bacteremia, recommend to extend antibiotic therapy for possible infective endocarditis and repeat TYRESE in 3 months.     ----------------------------------------------------------------------------------------------------------------------          Results from last 7 days   Lab Units 11/20/22  0452 11/18/22  0300 11/17/22  0131 11/14/22  0226   CRP mg/dL  --  8.38* 6.73* 5.83*   WBC 10*3/mm3 3.40 2.84* 3.11* 2.42*   HEMOGLOBIN g/dL 9.6* 9.1* 8.7* 8.6*   HEMATOCRIT % 31.0* 29.4* 27.4* 28.0*   MCV fL 83.3 83.3 82.8 84.3   MCHC g/dL 31.0* 31.0* 31.8 30.7*   PLATELETS 10*3/mm3 126* 104* 109* 103*   INR    --  1.18*  --   --                  Results from last 7 days   Lab Units 11/20/22  0452 11/18/22  0300 11/17/22  0131   SODIUM mmol/L 135* 132* 133*   POTASSIUM mmol/L 3.8 4.0 4.0   CHLORIDE mmol/L 100 99 100   CO2 mmol/L 25.5 24.5 25.4   BUN mg/dL 8 11 11   CREATININE  mg/dL 0.57* 0.71* 0.73*   CALCIUM mg/dL 8.2* 8.3* 8.1*   GLUCOSE mg/dL 139* 199* 184*   Estimated Creatinine Clearance: 161 mL/min (A) (by C-G formula based on SCr of 0.57 mg/dL (L)).  No results found for: AMMONIA       Results from last 7 days   Lab Units 11/14/22  0226   CK TOTAL U/L 18*                      Glucose   Date/Time Value Ref Range Status   11/20/2022 1042 289 (H) 70 - 130 mg/dL Final       Comment:       Meter: [id number] : [id number] Audrey Mcginnis   11/20/2022 0604 143 (H) 70 - 130 mg/dL Final       Comment:       Meter: [id number] : [id number] Hadley Crystal   11/19/2022 1608 220 (H) 70 - 130 mg/dL Final       Comment:       Meter: [id number] : [id number] lisa ashley   11/19/2022 1109 237 (H) 70 - 130 mg/dL Final       Comment:       Meter: [id number] : [id number] lisa ashley   11/19/2022 0621 121 70 - 130 mg/dL Final       Comment:       Meter: [id number] : [id number] Tracey Crystal   11/18/2022 1558 251 (H) 70 - 130 mg/dL Final       Comment:       Meter: [id number] : [id number] lisa ashley   11/18/2022 1101 224 (H) 70 - 130 mg/dL Final       Comment:       Meter: [id number] : [id number] mark massengill   11/18/2022 0619 139 (H) 70 - 130 mg/dL Final       Comment:       Meter: [id number] : [id number] Susi Velez            Lab Results   Component Value Date     TSH 2.390 10/10/2022     FREET4 0.87 (L) 10/10/2022      No results found for: PREGTESTUR, PREGSERUM, HCG, HCGQUANT          Pain Management Panel         Pain Management Panel Latest Ref Rng & Units 5/24/2022 5/11/2022     CREATININE UR mg/dL 91.0 -     AMPHETAMINES SCREEN, URINE Negative - Negative     BARBITURATES SCREEN Negative - Negative     BENZODIAZEPINE SCREEN, URINE Negative - Negative     BUPRENORPHINEUR Negative - Negative     COCAINE SCREEN, URINE Negative - Negative     METHADONE SCREEN, URINE Negative - Negative     METHAMPHETAMINEUR Negative - Negative                                    Brief Urine Lab Results  (Last result in the past 365 days)       Color   Clarity   Blood   Leuk Est   Nitrite   Protein   CREAT   Urine HCG         10/19/22 0746 Yellow    Clear    Negative    Negative    Negative    Negative                       No results found for: BLOODCX      No results found for: URINECX  No results found for: WOUNDCX  No results found for: STOOLCX         Results from last 7 days   Lab Units 11/18/22  0300 11/17/22  0131 11/14/22  0226   CRP mg/dL 8.38* 6.73* 5.83*         I have personally looked at the labs and they are summarized above.  ----------------------------------------------------------------------------------------------------------------------  Detailed radiology reports for the last 24 hours:         Imaging Results (Last 24 Hours)      ** No results found for the last 24 hours. **          Final impressions for the last 30 days of radiology reports:     Adult Transesophageal Echo (TYRESE) W/ Cont if Necessary Per Protocol     Addendum Date: 11/4/2022    •  Indication for TYRESE -to rule out infective endocarditis in a patient with MRSA bacteremia. •  Findings- •  Left ventricular systolic function is normal. Estimated left ventricular EF = 60% •  Left atrial appendage is well visualized and is free of thrombus. •  Saline test was negative for the evidence of shunt in interatrial septum. •  The tricuspid valve appeared normal in structure. There was a moderate sized echodense structure seen above  the posterior tricuspid leaflet but not attached to the leaflet. The appearance and location are not typical for regurgitation. This structure could be a normal variant. No evidence of tricuspid valve stenosis or significant regurgitations. •  Other valves also appear normal in structure with no evidence of stenosis or significant regurgitations.  No vegetations seen on these valves. •  There is no evidence of pericardial effusion. •  Comment- •  In view of presence of MRSA  bacteremia, recommend to extend antibiotic therapy for possible infective endocarditis and repeat TYRESE in 3 months.      Addendum Date: 11/3/2022    •  Indication for TYRESE -to rule out infective endocarditis in a patient with MRSA bacteremia. •  Findings- •  Left ventricular systolic function is normal. Estimated left ventricular EF = 60% •  Left atrial appendage is well visualized and is free of thrombus. •  Saline test was negative for the evidence of shunt in interatrial septum. •  The tricuspid valve appeared normal in structure. There was a moderate sized echodense structure seen above  the posterior tricuspid leaflet but not attached to the leaflet. The appearance and location are not typical for regurgitation. This structure could be a normal variant. No evidence of tricuspid valve stenosis or significant regurgitations. •  Other valves also appear normal in structure with no evidence of stenosis or significant regurgitations.  No vegetations seen on these valves. •  There is no evidence of pericardial effusion. •  Comment- •  In view of presence of MRSA bacteremia, recommend to extend antibiotic therapy for infective endocarditis and repeat TYRESE in 3 months.      Addendum Date: 11/3/2022    •  Indication for TYRESE -to rule out infective endocarditis in a patient with MRSA bacteremia. •  Findings- •  Left ventricular systolic function is normal. Estimated left ventricular EF = 60% •  Left atrial appendage is well visualized and is free of thrombus. •  Saline test was negative for the evidence of shunt in interatrial septum. •  The tricuspid valve appeared normal in structure. There was a moderate sized echodense structure seen above  the posterior tricuspid leaflet but not attached to the leaflet. The appearance and location are not typical for regurgitation. This structure could be a normal variant. No evidence of tricuspid valve stenosis or significant regurgitations. •  Other valves also appear normal in  structure with no evidence of stenosis or significant regurgitations.  No vegetations seen on these valves. •  There is no evidence of pericardial effusion.      Addendum Date: 11/3/2022    •  Indication for TYRESE -to rule out infective endocarditis in a patient with MRSA bacteremia. •  Findings- •  Left ventricular systolic function is normal. Estimated left ventricular EF = 60% •  Left atrial appendage is well visualized and is free of thrombus. •  Saline test was negative for the evidence of shunt in interatrial septum. •  The tricuspid valve appeared normal in structure. There was a moderate sized echodense structure seen in both the posterior tricuspid leaflet but not attached to the leaflet. The appearance and location are not typical for regurgitation. This structure could be a normal variant. No evidence of tricuspid valve stenosis or significant regurgitations. •  Other valves also appear normal in structure with no evidence of stenosis or significant regurgitations.  No vegetations seen on these valves. •  There is no evidence of pericardial effusion.      CT knee right wo contrast     Result Date: 11/17/2022  1.  Large knee joint effusion again noted. Suprapatellar region collection appears slightly larger now measuring 10.3 x 3.3 cm in axial dimension and was 8.8 x 2.8 cm. The collection again shows heterogeneous mixed density. 2.  Increasing lytic lucency of the lateral tibial plateau could represent osteomyelitis.  This report was finalized on 11/17/2022 1:15 PM by Dr. Oswaldo Brown MD.       US Venous Doppler Lower Extremity Right (duplex)     Result Date: 11/12/2022  No deep venous thrombus in the right lower extremity. Signer Name: Thomas Day MD  Signed: 11/12/2022 11:34 AM  Workstation Name: [facility]  [facility]     US Venous Doppler Upper Extremity Bilateral (duplex)     Result Date: 11/12/2022  Negative examination.  No evidence of bilateral upper extremity venous  thrombosis. Signer Name: Thomas Day MD  Signed: 11/12/2022 11:33 AM  Workstation Name: [id number]  [facility]     I have personally looked at the radiology images and read the final radiology report.     Assessment & Plan    Right septic knee with likely osteomyelitis--patient again will be following up with Ortho in [place] on Tuesday of this week with Dr. Lopez.  After discussion with him he recommends that we continue IV daptomycin for now and he will likely aspirate knee during his appointment and will discussed with patient plan for further care of knee.     Likely MRSA associated endocarditis.  Patient is currently on daptomycin which we will continue at this time.  Is scheduled for TYRESE tomorrow but we will delay this at this time I will until patient has had right knee further evaluated     Debility--patient requiring minimum assistance for bed mobility; contact-guard for to standby assist for transfers; ambulated 160 feet last week with front wheel walker and standby assistance.     Diabetes mellitus continue current treatment     Lumbago stable     Pancytopenia secondary to cirrhosis     Cirrhosis--stable     Orthostatic hypotension much improved is on midodrine and Florinef.        VTE Prophylaxis:       Mechanical Order History:               Ordered          11/17/22 1442   Place Sequential Compression Device  Once              11/17/22 1442   Maintain Sequential Compression Device  Continuous                               Pharmalogical Order History:                   Ordered     Dose Route Frequency Stop      11/18/22 1522   fondaparinux (ARIXTRA) injection 2.5 mg         2.5 mg SC Every 24 Hours Scheduled --      10/28/22 1742   fondaparinux (ARIXTRA) injection 2.5 mg  Status:  Discontinued         2.5 mg SC Every 24 Hours Scheduled 11/17/22 1442                        Joe Mike MD  [facility] Hospitalist  11/20/22  12:18 Fabrice Connolly  "A, DO   Physician  Orthopedics  Consults     Signed  Date of Service:  11/18/22 0946  Creation Time:  11/18/22 0946     Signed        Expand All Collapse All     Referring Provider: ROSALINA Christiansen  Reason for Consultation: possible septic knee      Patient Care Team:  Missy Up APRN as PCP - General (Nurse Practitioner)  Addison Colby MD as Consulting Physician (Podiatry)     Chief complaint  Knee pain         Subjective      In room currently, in wheelchair.   History of present illness: History of Present Illness  56 y/o male currently in rehab unit.  Past history of septic right knee, MRSA bacteremia.  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 The patient also had Right Knee I&D by Dr. Lomax at [facility] in [place] the last week in September for septic arthritis.  He states that he recalls twisting the right knee prior to onset of infection.    He was eventually transferred to [facility] for longterm IV antibiotics to include Daptomycin.  He was followed by ID there when an area of \"redness\" was noted to the lateral aspect of knee, prompting concern for recurrent infection.    The patient is now in the Rehab unit, continuing the antibiotics but also more therapy for the right knee. He states no visible change in appearance to the knee, no change in level of ROM or level of pain.     Orthopedics has been consulted to evaluate for possible recurrent septic joint.  Dr. Morales is aware of the consult and has requested that the knee be aspirated.  The patient remains on Daptomycin for endocarditis.  Review of Systems  Review of Systems   Constitutional: Positive for activity change. Negative for chills and fever.   HENT: Negative.    Respiratory: Negative.    Cardiovascular: Negative.    Gastrointestinal: Negative.    Endocrine: Negative.    Genitourinary: Negative.    Musculoskeletal: Positive for arthralgias, back pain, " gait problem and joint swelling.   Skin: Negative.    Allergic/Immunologic: Negative.    Neurological: Positive for weakness.   Hematological: Negative.    Psychiatric/Behavioral: Negative.          History  Medical History        Past Medical History:   Diagnosis Date   • Diabetes mellitus (HCC)     • Hyperlipidemia     • Hypertension     • Pyogenic arthritis of right knee joint, due to unspecified organism (HCC) 09/21/2022      ,   Surgical History         Past Surgical History:   Procedure Laterality Date   • ABSCESS DRAINAGE         PERINEUM   • AMPUTATION FOOT Left 5/18/2022     Procedure: AMPUTATION FOOT;  Surgeon: Addison Colby MD;  Location: [facility] OR;  Service: Podiatry;  Laterality: Left;   • INCISION AND DRAINAGE LEG Left 05/10/2022     Procedure: INCISION AND DRAINAGE LOWER EXTREMITY;  Surgeon: Addison Colby MD;  Location: [facility] OR;  Service: Podiatry;  Laterality: Left;   • KNEE INCISION AND DRAINAGE Right 9/21/2022     Procedure: KNEE INCISION AND DRAINAGE RIGHT;  Surgeon: Brain Bentley MD;  Location[facility] OR;  Service: Orthopedics;  Laterality: Right;   • WOUND DEBRIDEMENT Left 05/13/2022     Procedure: DEBRIDEMENT FOOT;  Surgeon: Addison Colby MD;  Location: [facility] OR;  Service: Podiatry;  Laterality: Left;      , History reviewed. No pertinent family history.,   Social History           Tobacco Use   • Smoking status: Never   • Smokeless tobacco: Never   Substance Use Topics   • Alcohol use: Never   • Drug use: Never   ,   Prescriptions Prior to Admission           Medications Prior to Admission   Medication Sig Dispense Refill Last Dose   • aspirin 81 MG EC tablet Take 1 tablet by mouth Daily.     Unknown   • atorvastatin (LIPITOR) 40 MG tablet Take 1 tablet by mouth Daily.     Unknown   • Diclofenac Sodium (VOLTAREN) 1 % gel gel Apply 2 g topically to the appropriate area as directed 4 (Four) Times a Day As Needed.     Unknown   • ferrous sulfate 325 (65 FE) MG tablet Take 1 tablet  by mouth 2 (Two) Times a Day.     Unknown   • Insulin Aspart (novoLOG) 100 UNIT/ML injection Inject 10 Units under the skin into the appropriate area as directed 3 (Three) Times a Day Before Meals.     Unknown   • insulin detemir (LEVEMIR) 100 UNIT/ML injection Inject 10 Units under the skin into the appropriate area as directed 2 (Two) Times a Day.     Unknown   • metFORMIN (GLUCOPHAGE) 500 MG tablet Take 1 tablet by mouth 2 (Two) Times a Day With Meals.     Unknown   • multivitamin with minerals tablet tablet Take 1 tablet by mouth Daily.     Unknown   • nabumetone (RELAFEN) 750 MG tablet Take 1 tablet by mouth 2 (Two) Times a Day.     Unknown   • pregabalin (LYRICA) 150 MG capsule Take 1 capsule by mouth every night at bedtime.     Unknown   • Psyllium (Metamucil) wafer wafer Take 2 wafers by mouth Daily.     Unknown   • spironolactone (ALDACTONE) 50 MG tablet Take 1 tablet by mouth Daily.     Unknown   • tiZANidine (ZANAFLEX) 4 MG tablet Take 1 tablet by mouth Every 12 (Twelve) Hours As Needed for Muscle Spasms.     Unknown      , Scheduled Meds:  ascorbic acid, 500 mg, Oral, Daily  aspirin, 81 mg, Oral, Daily  DAPTOmycin, 6 mg/kg (Adjusted), Intravenous, Q24H  Diclofenac Sodium, 4 g, Topical, 4x Daily  docusate sodium, 100 mg, Oral, BID  fludrocortisone, 50 mcg, Oral, Daily  Insulin Aspart, 0-14 Units, Subcutaneous, TID AC  insulin detemir, 18 Units, Subcutaneous, Q12H  iron polysaccharides, 150 mg, Oral, Daily  levothyroxine, 25 mcg, Oral, Q AM  lidocaine PF 1%, 5 mL, Injection, Once  lidocaine PF 2%, 10 mL, Injection, Once  midodrine, 10 mg, Oral, TID AC  multivitamin with minerals, 1 tablet, Oral, Daily  pantoprazole, 40 mg, Oral, Q AM  polyethylene glycol, 17 g, Oral, Daily  pregabalin, 100 mg, Oral, Q12H     , Continuous Infusions:  Pharmacy Consult,      , PRN Meds:  •  acetaminophen  •  bisacodyl  •  cyclobenzaprine  •  dextrose  •  dextrose  •  glucagon (human recombinant)  •   HYDROcodone-acetaminophen  •  magic barrier cream  •  Pharmacy Consult and Allergies:  Patient has no known allergies.           Objective      Surgical incision to right knee noted, appears healed, large effusion, with warm skin temperature.  No significant erythema noted.   Vital Signs   Temp:  [98.8 °F (37.1 °C)] 98.8 °F (37.1 °C)  Heart Rate:  [81-87] 86  Resp:  [18] 18  BP: (118-154)/(57-72) 154/72     Physical Exam:              Physical Exam  Constitutional:       General: He is not in acute distress.     Appearance: Normal appearance.   HENT:      Head: Normocephalic.   Cardiovascular:      Rate and Rhythm: Normal rate.   Pulmonary:      Effort: Pulmonary effort is normal. No respiratory distress.   Musculoskeletal:         General: Swelling and tenderness present.      Comments: Restricted ROM.  Flexion approx. 60. Unable to fully extend.   Incision well healed  Increase skin temp  Large effusion    Skin:     General: Skin is warm and dry.   Neurological:      General: No focal deficit present.      Mental Status: He is alert.   Psychiatric:         Mood and Affect: Mood normal.         Behavior: Behavior normal.            Results Review:              I reviewed the patient's new clinical results.     CRP; 8.38, WBC 2.84  CT knee: large joint effusion, lucency lateral tibial plateau.            Assessment & Plan         Right knee pain, previous septic joint  Right knee effusion        Debility        An aspiration has been planned for today.  Further plan to follow based on findings.         I discussed the patient's findings with the patient.      Kenia Jiang, APRN  11/18/22  09:46 EST     CT scan demonstrates increased effusion and lateral plateau destruction. Given recent surgery at [facility] and worsening septic knee which has now turned into osteomyelitis recommend transfer to [facility] to joint specialist. The patient should be evaluated by his surgeon and will likely need repeat  debridement, possible antibiotic spacer. Discussed with the patient the severity of the infection and recommendation transfer to [facility] or tertiary care [place].     Fabrice Wilson, Hanna Dominique, LIZETT   Physical Therapist Assistant  Specialty:  Physical Therapy  Therapy Treatment Note     Signed  Date of Service:  22  Creation Time:  22     Signed        Expand All Collapse All                                                                                                                                                                                                                                      Inpatient Rehabilitation - Physical Therapy Treatment Note                                                              YVONNE Gaines     Patient Name: Melchor Hardwick III                  : 1965                      MRN: [id number]     Today's Date: 2022                                                                                          Admit Date: 10/28/2022                        Visit Dx:   Visit Diagnosis       ICD-10-CM ICD-9-CM   1. Staphylococcal arthritis of right knee (HCC)  M00.061 711.06       041.10            Problem List       Patient Active Problem List   Diagnosis   • Hyperlipidemia   • Hypertensive disorder   • Obesity   • Type 2 diabetes mellitus with hyperglycemia, with long-term current use of insulin (HCC)   • Gas gangrene of foot (HCC)   • Cellulitis in diabetic foot (HCC)   • Other acute osteomyelitis of left foot (HCC)   • Osteomyelitis (HCC)   • Critical illness myopathy   • Staphylococcal arthritis of right knee (HCC)   • Other cirrhosis of liver (HCC)   • MRSA bacteremia   • Endocarditis of tricuspid valve   • Wound of right buttock   • History of osteomyelitis   • Debility            Medical History        Past Medical History:   Diagnosis Date   • Diabetes mellitus (HCC)     • Hyperlipidemia     • Hypertension     •  Pyogenic arthritis of right knee joint, due to unspecified organism (HCC) 09/21/2022            Surgical History         Past Surgical History:   Procedure Laterality Date   • ABSCESS DRAINAGE         PERINEUM   • AMPUTATION FOOT Left 5/18/2022     Procedure: AMPUTATION FOOT;  Surgeon: Addison Colby MD;  Location:  [facility];  Service: Podiatry;  Laterality: Left;   • INCISION AND DRAINAGE LEG Left 05/10/2022     Procedure: INCISION AND DRAINAGE LOWER EXTREMITY;  Surgeon: Addison Colby MD;  Location:  [facility];  Service: Podiatry;  Laterality: Left;   • KNEE INCISION AND DRAINAGE Right 9/21/2022     Procedure: KNEE INCISION AND DRAINAGE RIGHT;  Surgeon: Brain Bentley MD;  Location:  [facility];  Service: Orthopedics;  Laterality: Right;   • WOUND DEBRIDEMENT Left 05/13/2022     Procedure: DEBRIDEMENT FOOT;  Surgeon: Addison Colby MD;  Location:  [facility];  Service: Podiatry;  Laterality: Left;                PT ASSESSMENT (last 12 hours)                IRF PT Evaluation and Treatment             Row Name 11/18/22 1539 11/18/22 1515              PT Time and Intention      Document Type daily treatment;other (see comments)  first session  -HR daily treatment;other (see comments)  2nd session  -RF      Mode of Treatment individual therapy;physical therapy  -HR individual therapy;physical therapy  -RF      Patient/Family/Caregiver Comments/Observations Pt and RN in agreement for PT. Pt comleted sitting exercises with added Wt and resistance for strengthening and mobility. Pt swelling in R knee limiting mobility for exercises, but participates well.  -HR Pts sister present for treatment session this date. Education and demonstration provided for transfer training, bed mobility, gait training, and techniques for home safety, and techniques for HEP and stretching. Both parties verbalized understanding and acceptance of education. Pt reports R knee and low back pain this date.  -RF             Row Name 11/18/22  1539 11/18/22 1515              General Information      Patient Profile Reviewed yes  -HR yes  -RF      Existing Precautions/Restrictions fall;brace worn when out of bed  air cast LLE  -HR fall;brace worn when out of bed  air cast LLE  -RF             Row Name 11/18/22 1539 11/18/22 1515              Cognition/Psychosocial      Affect/Mental Status (Cognition) WFL  -HR WFL  -RF      Follows Commands (Cognition) WFL  -HR WFL  -RF      Personal Safety Interventions fall prevention program maintained;gait belt;supervised activity;nonskid shoes/slippers when out of bed  -HR gait belt;nonskid shoes/slippers when out of bed;fall prevention program maintained  -RF      Cognitive Function WFL  -HR WFL  -RF             Row Name 11/18/22 1515                   Mobility      Additional Documentation Wheelchair Mobility/Management (Group)  -               Row Name 11/18/22 1515                   Bed Mobility      Bed Mobility bed mobility (all) activities  -RF        All Activities, [place] (Bed Mobility) modified independence  -RF        Supine-Sit [place] (Bed Mobility) standby assist  -RF        Sit-Supine [place] (Bed Mobility) minimum assist (75% patient effort)  Requires assistance with LEs  -RF        Assistive Device (Bed Mobility) bed rails  -RF               Row Name 11/18/22 1515                   Transfer Assessment/Treatment      Transfers bed-chair transfer;chair-bed transfer;sit-stand transfer;stand-sit transfer;car transfer  -               Row Name 11/18/22 1515                   Bed-Chair Transfer      Bed-Chair [place] (Transfers) contact guard;standby assist  -RF        Assistive Device (Bed-Chair Transfers) walker, front-wheeled  -RF               Row Name 11/18/22 1515                   Chair-Bed Transfer      Chair-Bed [place] (Transfers) contact guard;standby assist  -RF        Assistive Device (Chair-Bed Transfers) wheelchair;walker, front-wheeled  -               Row  Name 11/18/22 1515                   Sit-Stand Transfer      Sit-Stand [place] (Transfers) contact guard;standby assist  -RF        Assistive Device (Sit-Stand Transfers) walker, front-wheeled  -RF               Row Name 11/18/22 1515                   Stand-Sit Transfer      Stand-Sit [place] (Transfers) contact guard;standby assist  -RF        Assistive Device (Stand-Sit Transfers) wheelchair;walker, front-wheeled  -RF               Row Name 11/18/22 1515                   Car Transfer      Type (Car Transfer) stand pivot/stand step  2 trials  -RF        [place] Level (Car Transfer) contact guard;standby assist  -RF        Assistive Device (Car Transfer) wheelchair;walker, front-wheeled  -RF        Comment, (Car Transfer) elevated height  -RF               Row Name 11/18/22 1515                   Gait/Stairs (Locomotion)      Gait/Stairs Locomotion gait/ambulation independence;gait/ambulation assistive device;distance ambulated  -RF        [place] Level (Gait) standby assist;supervision  -RF        Assistive Device (Gait) walker, front-wheeled  -RF        Distance in Feet (Gait) 160  -RF        Pattern (Gait) step-to;step-through  -RF        Bilateral Gait Deviations forward flexed posture  -RF        Right Sided Gait Deviations heel strike decreased;weight shift ability decreased  -RF        Comment, (Gait/Stairs) Increased knee flexion noted bilaterally; forward flexed posture noted due to c/o low back pain. Minimal unsteadiness observed with no LOB noted.  -RF               Row Name 11/18/22 1515                   Wheelchair Mobility/Management      Method of Wheelchair Locomotion (Mobility) bimanual (upper extremity) propulsion  -RF        Mobility Activities (Wheelchair) forward propulsion  -RF        Forward Propulsion [place] (Wheelchair) independent  -RF        Distance Propelled in Feet (Wheelchair) 160  -RF               Row Name 11/18/22 1539 11/18/22 1515              Safety  Issues, Functional Mobility      Impairments Affecting Function (Mobility) balance;endurance/activity tolerance;pain;range of motion (ROM);postural/trunk control  -HR balance;endurance/activity tolerance;pain;range of motion (ROM);postural/trunk control  -RF             Row Name 11/18/22 1539                   Balance      Comment, Balance Sitting 2#: AP, LAQ, march, ball sq, belt/ball resistance, BTB: HS curls, hip abd, march.  -HR               Row Name 11/18/22 1539                   Hip (Therapeutic Exercise)      Hip Strengthening (Therapeutic Exercise) bilateral;flexion;extension;aBduction;aDduction;marching while seated;sitting;2 lb free weight;resistance band;blue  -HR               Row Name 11/18/22 1539                   Knee (Therapeutic Exercise)      Knee Strengthening (Therapeutic Exercise) bilateral;flexion;extension;marching while seated;LAQ (long arc quad);hamstring curls;sitting;2 lb free weight;resistance band;blue  -HR               Row Name 11/18/22 1539                   Ankle (Therapeutic Exercise)      Ankle Strengthening (Therapeutic Exercise) bilateral;dorsiflexion;plantarflexion;sitting;2 lb free weight  -HR               Row Name 11/18/22 1539 11/18/22 1515              Positioning and Restraints      Pre-Treatment Position other (comment)  WC in Room  -HR sitting in chair/recliner  -RF      Post Treatment Position wheelchair  -HR bed  -RF      In Bed with PT  -HR supine;call light within reach;encouraged to call for assist;with family/caregiver  -RF             Row Name 11/18/22 1539 11/18/22 1515              Daily Progress Summary (PT)      Functional Goal Overall Progress (PT) -- progressing toward functional goals as expected  -RF      Daily Progress Summary (PT) -- Improving independence noted this date with functional mobility and gait training. Progress hindered by chronic LBP and R knee pain. R knee ROM deficits continually noted; education provided to pt and sister on techniques  for stretching and improtance for continued improvements. Continued skilled crae required for further LE strengthening to ensure maximum safe function upon D/C.  -RF      Impairments Still Limiting Function (PT) functional activity tolerance impairment;pain;range of motion deficit;strength deficit  -HR functional activity tolerance impairment;pain;range of motion deficit;strength deficit  -RF      Recommendations (PT) -- Continue per current POC  -RF             Row Name 11/18/22 1539 11/18/22 1515              IRF PT Goals      Bed Mobility Goal Selection (PT-IRF) bed mobility, PT goal 1  -HR bed mobility, PT goal 1  -RF      Transfer Goal Selection (PT-IRF) transfers, PT goal 1  -HR transfers, PT goal 1  -RF      Gait (Walking Locomotion) Goal Selection (PT-IRF) gait, PT goal 1  -HR gait, PT goal 1  -RF             Row Name 11/18/22 1539 11/18/22 1515              Bed Mobility Goal 1 (PT-IRF)      Activity/Assistive Device (Bed Mobility Goal 1, PT-IRF) bed mobility activities, all  -HR bed mobility activities, all  -RF      [place] Level (Bed Mobility Goal 1, PT-IRF) independent  -HR independent  -RF      Time Frame (Bed Mobility Goal 1, PT-IRF) long-term goal (LTG)  -HR long-term goal (LTG)  -RF      Progress/Outcomes (Bed Mobility Goal 1, PT-IRF) goal met  -HR goal met  -RF             Row Name 11/18/22 1539 11/18/22 1515              Transfer Goal 1 (PT-IRF)      Activity/Assistive Device (Transfer Goal 1, PT-IRF) sit-to-stand/stand-to-sit;bed-to-chair/chair-to-bed;walker, rolling  -HR sit-to-stand/stand-to-sit;bed-to-chair/chair-to-bed;walker, rolling  -RF      [place] Level (Transfer Goal 1, PT-IRF) modified independence  -HR modified independence  -RF      Time Frame (Transfer Goal 1, PT-IRF) long-term goal (LTG)  -HR long-term goal (LTG)  -RF      Progress/Outcomes (Transfer Goal 1, PT-IRF) new goal  -HR new goal  -RF             Row Name 11/18/22 1539 11/18/22 1515              Gait/Walking  Locomotion Goal 1 (PT-IRF)      Activity/Assistive Device (Gait/Walking Locomotion Goal 1, PT-IRF) gait (walking locomotion);assistive device use  -HR gait (walking locomotion);assistive device use  -RF      Gait/Walking Locomotion Distance Goal 1 (PT-IRF) 100'  -'  -RF      [place] Level (Gait/Walking Locomotion Goal 1, PT-IRF) modified independence  -HR modified independence  -RF      Time Frame (Gait/Walking Locomotion Goal 1, PT-IRF) long-term goal (LTG)  -HR long-term goal (LTG)  -RF      Progress/Outcomes (Gait/Walking Locomotion Goal 1, PT-IRF) goal revised this date  -HR goal revised this date  -RF                      User Key  (r) = Recorded By, (t) = Taken By, (c) = Cosigned By             Initials Name Provider Type      RF Meron Lei, PTA Physical Therapist Assistant      HR Hanna Murphy, LIZETT Physical Therapist Assistant                          Wound 09/21/22 1532 Right anterior knee Incision (Active)   Dressing Appearance open to air 11/18/22 0805   Closure Approximated 11/18/22 0805   Base dry;clean 11/18/22 0805   Drainage Amount none 11/18/22 0805   Care, Wound cleansed with 11/17/22 1920   Dressing Care open to air 11/17/22 1920       Wound 10/28/22 1827 Right coccyx Pressure Injury (Active)   Dressing Appearance open to air 11/18/22 0805   Closure Open to air 11/18/22 0805   Base non-blanchable;blanchable;pink 11/18/22 0805   Drainage Amount none 11/18/22 0805   Care, Wound barrier applied;pressure removed 11/18/22 0805   Dressing Care open to air 11/18/22 0805                     PT Recommendation and Plan     Frequency of Treatment (PT): 5 times per week, 90 minutes per session  Daily Progress Summary (PT)  Impairments Still Limiting Function (PT): functional activity tolerance impairment, pain, range of motion deficit, strength deficit                 Time Calculation:                PT Charges              Row Name 11/18/22 1006 11/18/22 1525                    Time  Calculation      Start Time 1000  -HR 1045  -RF        Stop Time 1045  -HR 1130  -RF        Time Calculation (min) 45 min  -HR 45 min  -RF        PT Received On 22  -HR 22  -RF        PT - Next Appointment -- 22  -RF        PT Goal Re-Cert Due Date -- 22  -RF                 Time Calculation- PT      Total Timed Code Minutes- PT 45 minute(s)  -HR 45 minute(s)  -RF                      Dorothy Rosas, OT   Occupational Therapist  Occupational Therapy  Therapy Treatment Note     Signed  Date of Service:  22  Creation Time:  22     Signed        Expand All Collapse All                                                                                                                                                                                                                                                                                                                                                                      Inpatient Rehabilitation - Occupational Therapy Treatment Note      [place]     Patient Name: Melchor Hardwick III                  : 1965                      MRN: [id number]     Today's Date: 2022                                                                           Admit Date: 10/28/2022                 IRF OT ASSESSMENT FLOWSHEET (last 12 hours)                IRF OT Evaluation and Treatment             Row Name 22 1203                   OT Time and Intention      Document Type daily treatment  -HB        Mode of Treatment individual therapy;occupational therapy  -HB        Total Minutes, Occupational Therapy 90  -HB        Patient Effort good  -HB        Symptoms Noted During/After Treatment none  -HB               Row Name 22 1203                   General Information      Patient Profile Reviewed yes  -HB        Patient/Family/Caregiver Comments/Observations Patient and RN okkavitha OT this date,.  -HB        General Observations  of Patient Patient tolerated OT well with no adverse reactions.  -HB        Existing Precautions/Restrictions fall;brace worn when out of bed  -HB               Row Name 11/18/22 1203                   Pain Assessment      Pretreatment Pain Rating 3/10  -HB        Posttreatment Pain Rating 5/10  -HB        Pain Location - back  -               Row Name 11/18/22 1203                   Cognition/Psychosocial      Affect/Mental Status (Cognition) WFL  -HB        Orientation Status (Cognition) oriented x 4  -HB        Follows Commands (Cognition) WFL  -HB               Row Name 11/18/22 1203                   Upper Body Dressing      [place] Level (Upper Body Dressing) upper body dressing skills;pull over garment;set up assistance  -        Position (Upper Body Dressing) supine  -HB               Row Name 11/18/22 1203                   Grooming      [place] Level (Grooming) grooming skills;set up  -HB               Row Name 11/18/22 1203                   Bed Mobility      Supine-Sit [place] (Bed Mobility) nonverbal cues (demo/gesture);verbal cues;standby assist  -               Row Name 11/18/22 1203                   Bed-Chair Transfer      Bed-Chair [place] (Transfers) minimum assist (75% patient effort);nonverbal cues (demo/gesture);verbal cues  -        Assistive Device (Bed-Chair Transfers) walker, front-wheeled  -               Row Name 11/18/22 1203                   Sit-Stand Transfer      Sit-Stand [place] (Transfers) contact guard;nonverbal cues (demo/gesture);verbal cues  -        Assistive Device (Sit-Stand Transfers) walker, front-wheeled  -               Row Name 11/18/22 1203                   Stand-Sit Transfer      Stand-Sit [place] (Transfers) contact guard;standby assist  -        Assistive Device (Stand-Sit Transfers) wheelchair;walker, front-wheeled  -               Row Name 11/18/22 1203                   Motor Skills      Motor Skills functional  endurance;coordination;motor control/coordination interventions  -        Motor Control/Coordination Interventions fine motor manipulation/dexterity activities;therapeutic exercise/ROM;gross motor coordination activities  -        Therapeutic Exercise shoulder;hand;wrist;elbow/forearm  -        Additional Documentation --  PRE 15 min; BUE TA to increase ADL status; wrist rolls; rest between sets  -               Row Name 11/18/22 1203                   Positioning and Restraints      Pre-Treatment Position in bed  -        Post Treatment Position wheelchair  -        In Bed encouraged to call for assist;call light within reach  -                        User Key  (r) = Recorded By, (t) = Taken By, (c) = Cosigned By             Initials Name Effective Dates       Dorothy Rosas, OT 05/25/21 -                              OT Recommendation and Plan     Planned Therapy Interventions (OT): activity tolerance training, adaptive equipment training, BADL retraining, IADL retraining, patient/caregiver education/training, strengthening exercise, transfer/mobility retraining         Time Calculation:                Time Calculation- OT              Row Name 11/18/22 1211                         Time Calculation- OT      OT Start Time 0745  -          OT Stop Time 0915  -          OT Time Calculation (min) 90 min  -          Total Timed Code Minutes- OT 90 minute(s)  -          OT Non-Billable Time (min) 10 min  -

## 2022-11-21 NOTE — PROGRESS NOTES
Inpatient Rehabilitation Functional Measures Assessment and Plan of Care    Plan of Care  Self Care    [OT] Dressing (Lower)(Active)  Current Status(11/21/2022): min  Weekly Goal(11/29/2022): SBA  Discharge Goal: SBA    Functional Measures  SHANNON Eating:  SHNANON Grooming:  SHANNON Bathing:  SHANNON Upper Body Dressing:  SHANNON Lower Body Dressing:  SHANNON Toileting:    SHANNON Bladder Management  Level of Assistance:  Frequency/Number of Accidents this Shift:    SHANNON Bowel Management  Level of Assistance:  Frequency/Number of Accidents this Shift:    SHANNON Bed/Chair/Wheelchair Transfer:  SHANNON Toilet Transfer:  SHANNON Tub/Shower Transfer:    Previously Documented Mode of Locomotion at Discharge:  SHANNON Expected Mode of Locomotion at Discharge:  SHANNON Walk/Wheelchair:  SHANNON Stairs:    SHANNON Comprehension:  SHANNON Expression:  SHANNON Social Interaction:  SHANNON Problem Solving:  SHANNON Memory:    Therapy Mode Minutes  Occupational Therapy: Individual: 90 minutes.  Physical Therapy:  Speech Language Pathology:    Discharge Functional Goals:    Signed by: Darcy Gramajo OT

## 2022-11-21 NOTE — PLAN OF CARE
Goal Outcome Evaluation:               Pt had low grade temp, multiple labs and test order, see orders. Pt is resting no signs or symptoms of distress. Continue with current plan of care.

## 2022-11-21 NOTE — THERAPY TREATMENT NOTE
Inpatient Rehabilitation - Physical Therapy Treatment Note       YVONNE Gaines     Patient Name: Melchor Hardwick III  : 1965  MRN: [id number]    Today's Date: 2022                    Admit Date: 10/28/2022      Visit Dx:     ICD-10-CM ICD-9-CM   1. Staphylococcal arthritis of right knee (HCC)  M00.061 711.06     041.10       Patient Active Problem List   Diagnosis   • Hyperlipidemia   • Hypertensive disorder   • Obesity   • Type 2 diabetes mellitus with hyperglycemia, with long-term current use of insulin (HCC)   • Gas gangrene of foot (HCC)   • Cellulitis in diabetic foot (HCC)   • Other acute osteomyelitis of left foot (HCC)   • Osteomyelitis (HCC)   • Critical illness myopathy   • Staphylococcal arthritis of right knee (HCC)   • Other cirrhosis of liver (HCC)   • MRSA bacteremia   • Endocarditis of tricuspid valve   • Wound of right buttock   • History of osteomyelitis   • Debility       Past Medical History:   Diagnosis Date   • Diabetes mellitus (HCC)    • Hyperlipidemia    • Hypertension    • Pyogenic arthritis of right knee joint, due to unspecified organism (HCC) 2022       Past Surgical History:   Procedure Laterality Date   • ABSCESS DRAINAGE      PERINEUM   • AMPUTATION FOOT Left 2022    Procedure: AMPUTATION FOOT;  Surgeon: Addison Colby MD;  Location: [facility] OR;  Service: Podiatry;  Laterality: Left;   • INCISION AND DRAINAGE LEG Left 05/10/2022    Procedure: INCISION AND DRAINAGE LOWER EXTREMITY;  Surgeon: Addison Colby MD;  Location:  [facility] OR;  Service: Podiatry;  Laterality: Left;   • KNEE INCISION AND DRAINAGE Right 2022    Procedure: KNEE INCISION AND DRAINAGE RIGHT;  Surgeon: Brain Bentley MD;  Location: [facility] OR;  Service: Orthopedics;  Laterality: Right;   • WOUND DEBRIDEMENT Left 2022    Procedure: DEBRIDEMENT FOOT;  Surgeon: Addison Colby MD;  Location: [facility] OR;  Service: Podiatry;  Laterality: Left;       PT ASSESSMENT (last 12 hours)     IRF PT  Evaluation and Treatment     Row Name 11/21/22 1508          PT Time and Intention    Document Type daily treatment;other (see comments)  -RF     Mode of Treatment individual therapy;physical therapy  -RF     Patient/Family/Caregiver Comments/Observations Pt reports he is not feeling well this date. Pt c/o nausea, chills, low back pain, and R knee pain.  -RF     Row Name 11/21/22 1508          General Information    Patient Profile Reviewed yes  -RF     Existing Precautions/Restrictions fall;brace worn when out of bed  air cast LLE  -RF     Row Name 11/21/22 1508          Cognition/Psychosocial    Affect/Mental Status (Cognition) WFL  -RF     Follows Commands (Cognition) WFL  -RF     Cognitive Function WFL  -RF     Row Name 11/21/22 1508          Bed Mobility    Bed Mobility bed mobility (all) activities  -RF     Supine-Sit [place] (Bed Mobility) standby assist  HOB elevated  -RF     Sit-Supine [place] (Bed Mobility) minimum assist (75% patient effort)  Requires assistance with LEs  -RF     Assistive Device (Bed Mobility) bed rails  -RF     Row Name 11/21/22 1508          Transfer Assessment/Treatment    Transfers bed-chair transfer;chair-bed transfer;sit-stand transfer;stand-sit transfer;car transfer  -RF     Row Name 11/21/22 1508          Bed-Chair Transfer    Bed-Chair [place] (Transfers) contact guard;standby assist  -RF     Assistive Device (Bed-Chair Transfers) walker, front-wheeled  -RF     Row Name 11/21/22 1508          Chair-Bed Transfer    Chair-Bed [place] (Transfers) contact guard;standby assist  -RF     Assistive Device (Chair-Bed Transfers) wheelchair;walker, front-wheeled  -RF     Row Name 11/21/22 1508          Sit-Stand Transfer    Sit-Stand [place] (Transfers) contact guard;standby assist  -RF     Assistive Device (Sit-Stand Transfers) walker, front-wheeled  -RF     Row Name 11/21/22 1508          Stand-Sit Transfer    Stand-Sit [place] (Transfers) contact  guard;standby assist  -RF     Assistive Device (Stand-Sit Transfers) wheelchair;walker, front-wheeled  -RF     Row Name 11/21/22 1508          Gait/Stairs (Locomotion)    Comment, (Gait/Stairs) Unable BID due to pain.  -RF     Row Name 11/21/22 1508          Safety Issues, Functional Mobility    Impairments Affecting Function (Mobility) balance;endurance/activity tolerance;pain;range of motion (ROM);postural/trunk control  -RF     Row Name 11/21/22 1508          Hip (Therapeutic Exercise)    Hip Strengthening (Therapeutic Exercise) bilateral;flexion;extension;aBduction;aDduction;heel slides;marching while seated;sitting;2 lb free weight;resistance band;blue;2 sets;10 repetitions  -RF     Row Name 11/21/22 1508          Knee (Therapeutic Exercise)    Knee Strengthening (Therapeutic Exercise) bilateral;flexion;extension;heel slides;marching while seated;LAQ (long arc quad);hamstring curls;sitting;2 lb free weight;resistance band;blue;2 sets;10 repetitions  -RF     Row Name 11/21/22 1508          Ankle (Therapeutic Exercise)    Ankle Strengthening (Therapeutic Exercise) bilateral;dorsiflexion;plantarflexion;sitting;2 lb free weight;2 sets;10 repetitions  -RF     Row Name 11/21/22 1508          Positioning and Restraints    Pre-Treatment Position sitting in chair/recliner  in bed in PM  -RF     In Bed supine;call light within reach;encouraged to call for assist  BID  -RF     Row Name 11/21/22 1508          Daily Progress Summary (PT)    Functional Goal Overall Progress (PT) progressing toward functional goals slower than expected  -RF     Daily Progress Summary (PT) PT significantly hindered by low back and knee pain this date. Fair mobility observed; R knee ROM deficits continually noted. Continued skilled care required for further improvements to ensure maximum safe function upon D/C.  -RF     Impairments Still Limiting Function (PT) functional activity tolerance impairment;pain;range of motion deficit;strength  deficit  -RF     Recommendations (PT) Continue per current POC  -RF     Row Name 11/21/22 1508          IRF PT Goals    Bed Mobility Goal Selection (PT-IRF) bed mobility, PT goal 1  -RF     Transfer Goal Selection (PT-IRF) transfers, PT goal 1  -RF     Gait (Walking Locomotion) Goal Selection (PT-IRF) gait, PT goal 1  -RF     Row Name 11/21/22 1508          Bed Mobility Goal 1 (PT-IRF)    Activity/Assistive Device (Bed Mobility Goal 1, PT-IRF) bed mobility activities, all  -RF     Strum Level (Bed Mobility Goal 1, PT-IRF) independent  -RF     Time Frame (Bed Mobility Goal 1, PT-IRF) long-term goal (LTG)  -RF     Progress/Outcomes (Bed Mobility Goal 1, PT-IRF) goal met  -RF     Row Name 11/21/22 1508          Transfer Goal 1 (PT-IRF)    Activity/Assistive Device (Transfer Goal 1, PT-IRF) sit-to-stand/stand-to-sit;bed-to-chair/chair-to-bed;walker, rolling  -RF     Strum Level (Transfer Goal 1, PT-IRF) modified independence  -RF     Time Frame (Transfer Goal 1, PT-IRF) long-term goal (LTG)  -RF     Progress/Outcomes (Transfer Goal 1, PT-IRF) new goal  -RF     Row Name 11/21/22 1508          Gait/Walking Locomotion Goal 1 (PT-IRF)    Activity/Assistive Device (Gait/Walking Locomotion Goal 1, PT-IRF) gait (walking locomotion);assistive device use  -RF     Gait/Walking Locomotion Distance Goal 1 (PT-IRF) 100'  -RF     Strum Level (Gait/Walking Locomotion Goal 1, PT-IRF) modified independence  -RF     Time Frame (Gait/Walking Locomotion Goal 1, PT-IRF) long-term goal (LTG)  -RF     Progress/Outcomes (Gait/Walking Locomotion Goal 1, PT-IRF) goal revised this date  -RF           User Key  (r) = Recorded By, (t) = Taken By, (c) = Cosigned By    Initials Name Provider Type    RF Meron Lei, PTA Physical Therapist Assistant              Wound 09/21/22 1532 Right anterior knee Incision (Active)   Dressing Appearance open to air 11/21/22 1049   Closure Approximated 11/20/22 1915   Base dry;clean  11/21/22 1049   Periwound Temperature warm 11/21/22 1049   Drainage Amount none 11/21/22 1049   Dressing Care open to air 11/21/22 1049       Wound 10/28/22 1827 Right coccyx Pressure Injury (Active)   Dressing Appearance open to air 11/21/22 1049   Closure Open to air 11/20/22 1915   Base non-blanchable;blanchable;pink 11/21/22 1049   Drainage Amount none 11/21/22 1049   Care, Wound barrier applied 11/21/22 1049   Dressing Care open to air 11/21/22 1049     Physical Therapy Education     Title: PT OT SLP Therapies (Done)     Topic: Physical Therapy (Done)     Point: Mobility training (Done)     Learning Progress Summary           Patient Acceptance, E,TB, VU by RF at 11/21/2022 1522    Acceptance, E,TB, VU,DU by HR at 11/18/2022 1546    Acceptance, E,TB, VU by RF at 11/18/2022 1525    Acceptance, E,TB, VU by RF at 11/17/2022 1528    Acceptance, E,TB, VU by DL at 11/17/2022 0233    Acceptance, E,TB, VU by RF at 11/16/2022 1606    Acceptance, E,TB, VU by DL at 11/15/2022 2033    Acceptance, E,TB, VU by RF at 11/15/2022 1541    Acceptance, E,TB, VU by DL at 11/15/2022 0305    Acceptance, E,TB, VU by RF at 11/14/2022 1528    Acceptance, E,TB, VU by RF at 11/10/2022 1551    Acceptance, E,TB, VU by RF at 11/9/2022 1622    Acceptance, E,TB, VU by DG at 11/9/2022 0050    Acceptance, E,TB, VU by RF at 11/8/2022 1539    Acceptance, E,TB, VU by DG at 11/8/2022 0114    Acceptance, E,TB, VU by RF at 11/7/2022 1607    Acceptance, E,TB, VU by DG at 11/6/2022 2023    Acceptance, E,TB, VU by RF at 11/4/2022 1551    Acceptance, E,TB, VU,DU by HR at 11/3/2022 1541    Acceptance, E,D, VU,NR by RG at 11/3/2022 1442    Acceptance, E,TB, VU by DG at 11/1/2022 2016    Acceptance, E,D, VU,NR by RG at 11/1/2022 1541    Acceptance, E,TB, VU by DG at 10/31/2022 2315    Acceptance, E,D, VU,NR by RG at 10/31/2022 1446                   Point: Home exercise program (Done)     Learning Progress Summary           Patient Acceptance, E,TB, VU by   at 11/21/2022 1522    Acceptance, E,TB, VU,DU by HR at 11/18/2022 1546    Acceptance, E,TB, VU by RF at 11/18/2022 1525    Acceptance, E,TB, VU by RF at 11/17/2022 1528    Acceptance, E,TB, VU by DL at 11/17/2022 0233    Acceptance, E,TB, VU by RF at 11/16/2022 1606    Acceptance, E,TB, VU by DL at 11/15/2022 2033    Acceptance, E,TB, VU by RF at 11/15/2022 1541    Acceptance, E,TB, VU by DL at 11/15/2022 0305    Acceptance, E,TB, VU by RF at 11/14/2022 1528    Acceptance, E,TB, VU by RF at 11/10/2022 1551    Acceptance, E,TB, VU by RF at 11/9/2022 1622    Acceptance, E,TB, VU by DG at 11/9/2022 0050    Acceptance, E,TB, VU by RF at 11/8/2022 1539    Acceptance, E,TB, VU by DG at 11/8/2022 0114    Acceptance, E,TB, VU by RF at 11/7/2022 1607    Acceptance, E,TB, VU by DG at 11/6/2022 2023    Acceptance, E,TB, VU by RF at 11/4/2022 1551    Acceptance, E,TB, VU,DU by HR at 11/3/2022 1541    Acceptance, E,D, VU,NR by RG at 11/3/2022 1442    Acceptance, E,TB, VU by DG at 11/1/2022 2016    Acceptance, E,D, VU,NR by RG at 11/1/2022 1541    Acceptance, E,TB, VU by DG at 10/31/2022 2315    Acceptance, E,D, VU,NR by RG at 10/31/2022 1446                   Point: Body mechanics (Done)     Learning Progress Summary           Patient Acceptance, E,TB, VU by RF at 11/21/2022 1522    Acceptance, E,TB, VU,DU by HR at 11/18/2022 1546    Acceptance, E,TB, VU by RF at 11/18/2022 1525    Acceptance, E,TB, VU by RF at 11/17/2022 1528    Acceptance, E,TB, VU by DL at 11/17/2022 0233    Acceptance, E,TB, VU by RF at 11/16/2022 1606    Acceptance, E,TB, VU by DL at 11/15/2022 2033    Acceptance, E,TB, VU by RF at 11/15/2022 1541    Acceptance, E,TB, VU by DL at 11/15/2022 0305    Acceptance, E,TB, VU by RF at 11/14/2022 1528    Acceptance, E,TB, VU by RF at 11/10/2022 1551    Acceptance, E,TB, VU by RF at 11/9/2022 1622    Acceptance, E,TB, VU by DG at 11/9/2022 0050    Acceptance, E,TB, VU by RF at 11/8/2022 1539    Acceptance, E,TB, VU  by DG at 11/8/2022 0114    Acceptance, E,TB, VU by RF at 11/7/2022 1607    Acceptance, E,TB, VU by DG at 11/6/2022 2023    Acceptance, E,TB, VU by RF at 11/4/2022 1551    Acceptance, E,TB, VU,DU by HR at 11/3/2022 1541    Acceptance, E,D, VU,NR by RG at 11/3/2022 1442    Acceptance, E,TB, VU by DG at 11/1/2022 2016    Acceptance, E,D, VU,NR by RG at 11/1/2022 1541    Acceptance, E,TB, VU by DG at 10/31/2022 2315    Acceptance, E,D, VU,NR by RG at 10/31/2022 1446                   Point: Precautions (Done)     Learning Progress Summary           Patient Acceptance, E,TB, VU by RF at 11/21/2022 1522    Acceptance, E,TB, VU,DU by HR at 11/18/2022 1546    Acceptance, E,TB, VU by RF at 11/18/2022 1525    Acceptance, E,TB, VU by RF at 11/17/2022 1528    Acceptance, E,TB, VU by DL at 11/17/2022 0233    Acceptance, E,TB, VU by RF at 11/16/2022 1606    Acceptance, E,TB, VU by DL at 11/15/2022 2033    Acceptance, E,TB, VU by RF at 11/15/2022 1541    Acceptance, E,TB, VU by DL at 11/15/2022 0305    Acceptance, E,TB, VU by RF at 11/14/2022 1528    Acceptance, E,TB, VU by RF at 11/10/2022 1551    Acceptance, E,TB, VU by RF at 11/9/2022 1622    Acceptance, E,TB, VU by DG at 11/9/2022 0050    Acceptance, E,TB, VU by RF at 11/8/2022 1539    Acceptance, E,TB, VU by DG at 11/8/2022 0114    Acceptance, E,TB, VU by RF at 11/7/2022 1607    Acceptance, E,TB, VU by DG at 11/6/2022 2023    Acceptance, E,TB, VU by RF at 11/4/2022 1551    Acceptance, E,TB, VU,DU by HR at 11/3/2022 1541    Acceptance, E,D, VU,NR by RG at 11/3/2022 1442    Acceptance, E,TB, VU by DG at 11/1/2022 2016    Acceptance, E,D, VU,NR by RG at 11/1/2022 1541    Acceptance, E,TB, VU by DG at 10/31/2022 2315    Acceptance, E,D, VU,NR by RG at 10/31/2022 1446                               User Key     Initials Effective Dates Name Provider Type Discipline     06/16/21 -  Phyllis Yañez, RN Registered Nurse Nurse     06/16/21 -  Meron Lei PTA Physical  Therapist Assistant PT    RG 06/16/21 -  Michi Mckeon PTA Physical Therapist Assistant PT    HR 01/14/22 -  Hanna Murphy PTA Physical Therapist Assistant PT    DL 03/16/22 -  Hellen Mcdonnell, RN Registered Nurse Nurse                PT Recommendation and Plan    Frequency of Treatment (PT): 5 times per week, 90 minutes per session     Daily Progress Summary (PT)  Functional Goal Overall Progress (PT): progressing toward functional goals slower than expected  Daily Progress Summary (PT): PT significantly hindered by low back and knee pain this date. Fair mobility observed; R knee ROM deficits continually noted. Continued skilled care required for further improvements to ensure maximum safe function upon D/C.  Impairments Still Limiting Function (PT): functional activity tolerance impairment, pain, range of motion deficit, strength deficit  Recommendations (PT): Continue per current POC               Time Calculation:      PT Charges     Row Name 11/21/22 1523 11/21/22 1522          Time Calculation    Start Time 1330  -RF 1045  -RF     Stop Time 1415  -RF 1130  -RF     Time Calculation (min) 45 min  -RF 45 min  -RF     PT Received On 11/21/22  -RF 11/21/22  -RF     PT - Next Appointment 11/22/22  -RF 11/21/22  -RF     PT Goal Re-Cert Due Date 11/25/22  -RF 11/25/22  -RF        Time Calculation- PT    Total Timed Code Minutes- PT 45 minute(s)  -RF 45 minute(s)  -RF           User Key  (r) = Recorded By, (t) = Taken By, (c) = Cosigned By    Initials Name Provider Type    RF Meron Lei PTA Physical Therapist Assistant                Therapy Charges for Today     Code Description Service Date Service Provider Modifiers Qty    48442790882 HC PT THER PROC EA 15 MIN 11/21/2022 Meron Lei, LIZETT GP, CQ 4    94980995096 HC PT THERAPEUTIC ACT EA 15 MIN 11/21/2022 Meron Lei, LIZETT GP, CQ 2                   Meron Lei PTA  11/21/2022

## 2022-11-21 NOTE — SIGNIFICANT NOTE
11/21/22 0940   Plan   Plan Pt has an appointment on 11-22-22 at 3:15 pm with Dr. Brain Bentley, Orthopedic Surgeon, in [place].  Spoke to sister Ros [phone] who is agreeable to come to rehab around 12:45 pm with her spouse and they will transport pt to this appointment.  Sister aware Orthopedic Surgeon will decide how to proceed with pt's treatment at this appointment.  Provided sister with address to Dr. Bentley's office and phone number; she knows where the office is.  Sister and brother-in-law to bring pt back to rehab if he does not need admission to hospital in [place] for Orthopedic Surgery.  SS to assist with discharge planning.   Patient/Family in Agreement with Plan yes

## 2022-11-22 LAB
ANION GAP SERPL CALCULATED.3IONS-SCNC: 9.1 MMOL/L (ref 5–15)
BASOPHILS # BLD AUTO: 0.01 10*3/MM3 (ref 0–0.2)
BASOPHILS NFR BLD AUTO: 0.3 % (ref 0–1.5)
BUN SERPL-MCNC: 12 MG/DL (ref 6–20)
BUN/CREAT SERPL: 14.8 (ref 7–25)
CALCIUM SPEC-SCNC: 7.8 MG/DL (ref 8.6–10.5)
CHLORIDE SERPL-SCNC: 99 MMOL/L (ref 98–107)
CO2 SERPL-SCNC: 23.9 MMOL/L (ref 22–29)
CREAT SERPL-MCNC: 0.81 MG/DL (ref 0.76–1.27)
DEPRECATED RDW RBC AUTO: 49.4 FL (ref 37–54)
EGFRCR SERPLBLD CKD-EPI 2021: 102.8 ML/MIN/1.73
EOSINOPHIL # BLD AUTO: 0.06 10*3/MM3 (ref 0–0.4)
EOSINOPHIL NFR BLD AUTO: 1.6 % (ref 0.3–6.2)
ERYTHROCYTE [DISTWIDTH] IN BLOOD BY AUTOMATED COUNT: 16.3 % (ref 12.3–15.4)
GLUCOSE BLDC GLUCOMTR-MCNC: 244 MG/DL (ref 70–130)
GLUCOSE BLDC GLUCOMTR-MCNC: 259 MG/DL (ref 70–130)
GLUCOSE BLDC GLUCOMTR-MCNC: 279 MG/DL (ref 70–130)
GLUCOSE SERPL-MCNC: 272 MG/DL (ref 65–99)
HCT VFR BLD AUTO: 28.9 % (ref 37.5–51)
HGB BLD-MCNC: 9.1 G/DL (ref 13–17.7)
IMM GRANULOCYTES # BLD AUTO: 0.01 10*3/MM3 (ref 0–0.05)
IMM GRANULOCYTES NFR BLD AUTO: 0.3 % (ref 0–0.5)
LYMPHOCYTES # BLD AUTO: 0.78 10*3/MM3 (ref 0.7–3.1)
LYMPHOCYTES NFR BLD AUTO: 20.2 % (ref 19.6–45.3)
MCH RBC QN AUTO: 26.1 PG (ref 26.6–33)
MCHC RBC AUTO-ENTMCNC: 31.5 G/DL (ref 31.5–35.7)
MCV RBC AUTO: 82.8 FL (ref 79–97)
MONOCYTES # BLD AUTO: 0.34 10*3/MM3 (ref 0.1–0.9)
MONOCYTES NFR BLD AUTO: 8.8 % (ref 5–12)
NEUTROPHILS NFR BLD AUTO: 2.67 10*3/MM3 (ref 1.7–7)
NEUTROPHILS NFR BLD AUTO: 68.8 % (ref 42.7–76)
NRBC BLD AUTO-RTO: 0 /100 WBC (ref 0–0.2)
PLATELET # BLD AUTO: 126 10*3/MM3 (ref 140–450)
PMV BLD AUTO: 10.9 FL (ref 6–12)
POTASSIUM SERPL-SCNC: 4.2 MMOL/L (ref 3.5–5.2)
RBC # BLD AUTO: 3.49 10*6/MM3 (ref 4.14–5.8)
SODIUM SERPL-SCNC: 132 MMOL/L (ref 136–145)
WBC NRBC COR # BLD: 3.87 10*3/MM3 (ref 3.4–10.8)

## 2022-11-22 PROCEDURE — 97110 THERAPEUTIC EXERCISES: CPT

## 2022-11-22 PROCEDURE — 63710000001 INSULIN ASPART PER 5 UNITS: Performed by: INTERNAL MEDICINE

## 2022-11-22 PROCEDURE — 25010000002 FONDAPARINUX PER 0.5 MG: Performed by: FAMILY MEDICINE

## 2022-11-22 PROCEDURE — 85025 COMPLETE CBC W/AUTO DIFF WBC: CPT | Performed by: INTERNAL MEDICINE

## 2022-11-22 PROCEDURE — 82962 GLUCOSE BLOOD TEST: CPT

## 2022-11-22 PROCEDURE — 87040 BLOOD CULTURE FOR BACTERIA: CPT | Performed by: INTERNAL MEDICINE

## 2022-11-22 PROCEDURE — 97530 THERAPEUTIC ACTIVITIES: CPT

## 2022-11-22 PROCEDURE — 25010000002 CEFTAROLINE FOSAMIL PER 10 MG: Performed by: INTERNAL MEDICINE

## 2022-11-22 PROCEDURE — 87150 DNA/RNA AMPLIFIED PROBE: CPT | Performed by: INTERNAL MEDICINE

## 2022-11-22 PROCEDURE — 87147 CULTURE TYPE IMMUNOLOGIC: CPT | Performed by: INTERNAL MEDICINE

## 2022-11-22 PROCEDURE — 25010000002 DAPTOMYCIN PER 1 MG: Performed by: FAMILY MEDICINE

## 2022-11-22 PROCEDURE — 97112 NEUROMUSCULAR REEDUCATION: CPT

## 2022-11-22 PROCEDURE — 80048 BASIC METABOLIC PNL TOTAL CA: CPT | Performed by: INTERNAL MEDICINE

## 2022-11-22 PROCEDURE — 63710000001 INSULIN DETEMIR PER 5 UNITS: Performed by: INTERNAL MEDICINE

## 2022-11-22 PROCEDURE — 97535 SELF CARE MNGMENT TRAINING: CPT

## 2022-11-22 PROCEDURE — 87186 SC STD MICRODIL/AGAR DIL: CPT | Performed by: INTERNAL MEDICINE

## 2022-11-22 PROCEDURE — 99308 SBSQ NF CARE LOW MDM 20: CPT | Performed by: INTERNAL MEDICINE

## 2022-11-22 RX ADMIN — FONDAPARINUX SODIUM 2.5 MG: 2.5 INJECTION, SOLUTION SUBCUTANEOUS at 09:12

## 2022-11-22 RX ADMIN — DICLOFENAC 4 G: 10 GEL TOPICAL at 20:27

## 2022-11-22 RX ADMIN — PREGABALIN 100 MG: 100 CAPSULE ORAL at 20:41

## 2022-11-22 RX ADMIN — HYDROCODONE BITARTRATE AND ACETAMINOPHEN 1 TABLET: 5; 325 TABLET ORAL at 09:15

## 2022-11-22 RX ADMIN — FLUDROCORTISONE ACETATE 50 MCG: 0.1 TABLET ORAL at 09:13

## 2022-11-22 RX ADMIN — DICLOFENAC 4 G: 10 GEL TOPICAL at 00:06

## 2022-11-22 RX ADMIN — PANTOPRAZOLE SODIUM 40 MG: 40 TABLET, DELAYED RELEASE ORAL at 05:55

## 2022-11-22 RX ADMIN — ASPIRIN 81 MG: 81 TABLET, COATED ORAL at 09:13

## 2022-11-22 RX ADMIN — INSULIN ASPART 4 UNITS: 100 INJECTION, SOLUTION INTRAVENOUS; SUBCUTANEOUS at 09:16

## 2022-11-22 RX ADMIN — INSULIN DETEMIR 22 UNITS: 100 INJECTION, SOLUTION SUBCUTANEOUS at 09:14

## 2022-11-22 RX ADMIN — DICLOFENAC 4 G: 10 GEL TOPICAL at 09:32

## 2022-11-22 RX ADMIN — CEFTAROLINE FOSAMIL 600 MG: 600 POWDER, FOR SOLUTION INTRAVENOUS at 20:27

## 2022-11-22 RX ADMIN — CYCLOBENZAPRINE 5 MG: 10 TABLET, FILM COATED ORAL at 09:13

## 2022-11-22 RX ADMIN — Medication 1 TABLET: at 12:02

## 2022-11-22 RX ADMIN — CEFTAROLINE FOSAMIL 600 MG: 600 POWDER, FOR SOLUTION INTRAVENOUS at 09:18

## 2022-11-22 RX ADMIN — INSULIN ASPART 4 UNITS: 100 INJECTION, SOLUTION INTRAVENOUS; SUBCUTANEOUS at 12:02

## 2022-11-22 RX ADMIN — INSULIN ASPART 8 UNITS: 100 INJECTION, SOLUTION INTRAVENOUS; SUBCUTANEOUS at 12:02

## 2022-11-22 RX ADMIN — CARBIDOPA AND LEVODOPA 7.5 MG: 50; 200 TABLET, EXTENDED RELEASE ORAL at 09:13

## 2022-11-22 RX ADMIN — CARBIDOPA AND LEVODOPA 7.5 MG: 50; 200 TABLET, EXTENDED RELEASE ORAL at 12:02

## 2022-11-22 RX ADMIN — INSULIN DETEMIR 22 UNITS: 100 INJECTION, SOLUTION SUBCUTANEOUS at 20:41

## 2022-11-22 RX ADMIN — Medication 150 MG: at 12:02

## 2022-11-22 RX ADMIN — PREGABALIN 100 MG: 100 CAPSULE ORAL at 09:16

## 2022-11-22 RX ADMIN — INSULIN ASPART 8 UNITS: 100 INJECTION, SOLUTION INTRAVENOUS; SUBCUTANEOUS at 09:15

## 2022-11-22 RX ADMIN — LEVOTHYROXINE SODIUM 25 MCG: 0.07 TABLET ORAL at 05:53

## 2022-11-22 RX ADMIN — OXYCODONE HYDROCHLORIDE AND ACETAMINOPHEN 500 MG: 500 TABLET ORAL at 09:15

## 2022-11-22 RX ADMIN — DICLOFENAC 4 G: 10 GEL TOPICAL at 19:54

## 2022-11-22 RX ADMIN — DAPTOMYCIN 500 MG: 500 INJECTION, POWDER, LYOPHILIZED, FOR SOLUTION INTRAVENOUS at 20:26

## 2022-11-22 NOTE — THERAPY TREATMENT NOTE
Inpatient Rehabilitation - Physical Therapy Treatment Note       YVONNE Gaines     Patient Name: Melchor Hardwick III  : 1965  MRN: [id number]    Today's Date: 2022                    Admit Date: 10/28/2022      Visit Dx:     ICD-10-CM ICD-9-CM   1. Staphylococcal arthritis of right knee (HCC)  M00.061 711.06     041.10       Patient Active Problem List   Diagnosis   • Hyperlipidemia   • Hypertensive disorder   • Obesity   • Type 2 diabetes mellitus with hyperglycemia, with long-term current use of insulin (HCC)   • Gas gangrene of foot (HCC)   • Cellulitis in diabetic foot (HCC)   • Other acute osteomyelitis of left foot (HCC)   • Osteomyelitis (HCC)   • Critical illness myopathy   • Staphylococcal arthritis of right knee (HCC)   • Other cirrhosis of liver (HCC)   • MRSA bacteremia   • Endocarditis of tricuspid valve   • Wound of right buttock   • History of osteomyelitis   • Debility       Past Medical History:   Diagnosis Date   • Diabetes mellitus (HCC)    • Hyperlipidemia    • Hypertension    • Pyogenic arthritis of right knee joint, due to unspecified organism (HCC) 2022       Past Surgical History:   Procedure Laterality Date   • ABSCESS DRAINAGE      PERINEUM   • AMPUTATION FOOT Left 2022    Procedure: AMPUTATION FOOT;  Surgeon: Addison Colby MD;  Location: [facility] OR;  Service: Podiatry;  Laterality: Left;   • INCISION AND DRAINAGE LEG Left 05/10/2022    Procedure: INCISION AND DRAINAGE LOWER EXTREMITY;  Surgeon: Addison Colby MD;  Location:  [facility] OR;  Service: Podiatry;  Laterality: Left;   • KNEE INCISION AND DRAINAGE Right 2022    Procedure: KNEE INCISION AND DRAINAGE RIGHT;  Surgeon: Brain Bentley MD;  Location: [facility] OR;  Service: Orthopedics;  Laterality: Right;   • WOUND DEBRIDEMENT Left 2022    Procedure: DEBRIDEMENT FOOT;  Surgeon: Addison Colby MD;  Location: [facility] OR;  Service: Podiatry;  Laterality: Left;       PT ASSESSMENT (last 12 hours)     IRF PT  Evaluation and Treatment     Row Name 11/22/22 1559          PT Time and Intention    Document Type daily treatment;other (see comments)  -RF     Mode of Treatment individual therapy;physical therapy  -RF     Patient/Family/Caregiver Comments/Observations C/o low back pain and R knee pain noted this date.  -RF     Row Name 11/22/22 1559          General Information    Patient Profile Reviewed yes  -RF     Existing Precautions/Restrictions fall;brace worn when out of bed  air cast LLE  -RF     Row Name 11/22/22 1559          Cognition/Psychosocial    Affect/Mental Status (Cognition) WFL  -RF     Follows Commands (Cognition) WFL  -RF     Personal Safety Interventions nonskid shoes/slippers when out of bed;fall prevention program maintained;gait belt  -RF     Cognitive Function WFL  -RF     Row Name 11/22/22 1559          Bed Mobility    Bed Mobility bed mobility (all) activities  -RF     Supine-Sit [place] (Bed Mobility) minimum assist (75% patient effort);contact guard  HOB elevated  -RF     Sit-Supine [place] (Bed Mobility) minimum assist (75% patient effort)  Requires assistance with LEs  -RF     Assistive Device (Bed Mobility) bed rails  -RF     Row Name 11/22/22 1559          Transfer Assessment/Treatment    Transfers bed-chair transfer;chair-bed transfer;sit-stand transfer;stand-sit transfer;car transfer  -RF     Row Name 11/22/22 1559          Bed-Chair Transfer    Bed-Chair [place] (Transfers) contact guard;standby assist  -RF     Assistive Device (Bed-Chair Transfers) walker, front-wheeled  -RF     Row Name 11/22/22 1559          Chair-Bed Transfer    Chair-Bed [place] (Transfers) contact guard;standby assist  -RF     Assistive Device (Chair-Bed Transfers) wheelchair;walker, front-wheeled  -RF     Row Name 11/22/22 1559          Sit-Stand Transfer    Sit-Stand [place] (Transfers) contact guard;standby assist  -RF     Assistive Device (Sit-Stand Transfers) walker, front-wheeled  -RF      Row Name 11/22/22 1559          Stand-Sit Transfer    Stand-Sit [place] (Transfers) contact guard;standby assist  -RF     Assistive Device (Stand-Sit Transfers) wheelchair;walker, front-wheeled  -     Row Name 11/22/22 1559          Gait/Stairs (Locomotion)    Comment, (Gait/Stairs) Unable to ambulate due to pain.  -     Row Name 11/22/22 1559          Safety Issues, Functional Mobility    Impairments Affecting Function (Mobility) balance;endurance/activity tolerance;pain;range of motion (ROM);postural/trunk control  -     Row Name 11/22/22 1559          Balance    Dynamic Sitting Balance modified independence  shoulder blade squeeze, low row with GTB; chest press, bicep curl, overhead press with #2 ball, bag toss with reach outside CODIE while encouraged to WB on LEs,  with reacher.  -     Row Name 11/22/22 1559          Hip (Therapeutic Exercise)    Hip Strengthening (Therapeutic Exercise) bilateral;flexion;extension;aBduction;aDduction;heel slides;marching while seated;sitting;2 lb free weight;resistance band;blue;2 sets;20 repititions  -     Row Name 11/22/22 1559          Knee (Therapeutic Exercise)    Knee Strengthening (Therapeutic Exercise) bilateral;flexion;extension;heel slides;marching while seated;LAQ (long arc quad);hamstring curls;sitting;2 lb free weight;resistance band;blue;2 sets;20 repititions  -     Row Name 11/22/22 1559          Ankle (Therapeutic Exercise)    Ankle Strengthening (Therapeutic Exercise) bilateral;dorsiflexion;plantarflexion;sitting;2 lb free weight;2 sets;20 repititions  -     Row Name 11/22/22 1559          Positioning and Restraints    Pre-Treatment Position in bed  -RF     In Bed supine;call light within reach;encouraged to call for assist  -     Row Name 11/22/22 1559          Daily Progress Summary (PT)    Daily Progress Summary (PT) Pt hindered by low back pain and R knee pain at this time. Fair functional mobility noted, however, assistance  required for safety. Continued skilled care required for further improvements to ensure maximum safe function upon D/C.  -RF     Impairments Still Limiting Function (PT) functional activity tolerance impairment;pain;range of motion deficit;strength deficit  -RF     Recommendations (PT) Continue per current POC  -RF     Row Name 11/22/22 1559          IRF PT Goals    Bed Mobility Goal Selection (PT-IRF) bed mobility, PT goal 1  -RF     Transfer Goal Selection (PT-IRF) transfers, PT goal 1  -RF     Gait (Walking Locomotion) Goal Selection (PT-IRF) gait, PT goal 1  -RF     Row Name 11/22/22 1559          Bed Mobility Goal 1 (PT-IRF)    Activity/Assistive Device (Bed Mobility Goal 1, PT-IRF) bed mobility activities, all  -RF     [place] Level (Bed Mobility Goal 1, PT-IRF) independent  -RF     Time Frame (Bed Mobility Goal 1, PT-IRF) long-term goal (LTG)  -RF     Progress/Outcomes (Bed Mobility Goal 1, PT-IRF) goal met  -RF     Row Name 11/22/22 1559          Transfer Goal 1 (PT-IRF)    Activity/Assistive Device (Transfer Goal 1, PT-IRF) sit-to-stand/stand-to-sit;bed-to-chair/chair-to-bed;walker, rolling  -RF     [place] Level (Transfer Goal 1, PT-IRF) modified independence  -RF     Time Frame (Transfer Goal 1, PT-IRF) long-term goal (LTG)  -RF     Progress/Outcomes (Transfer Goal 1, PT-IRF) new goal  -RF     Row Name 11/22/22 1559          Gait/Walking Locomotion Goal 1 (PT-IRF)    Activity/Assistive Device (Gait/Walking Locomotion Goal 1, PT-IRF) gait (walking locomotion);assistive device use  -RF     Gait/Walking Locomotion Distance Goal 1 (PT-IRF) 100'  -RF     [place] Level (Gait/Walking Locomotion Goal 1, PT-IRF) modified independence  -RF     Time Frame (Gait/Walking Locomotion Goal 1, PT-IRF) long-term goal (LTG)  -RF     Progress/Outcomes (Gait/Walking Locomotion Goal 1, PT-IRF) goal revised this date  -RF           User Key  (r) = Recorded By, (t) = Taken By, (c) = Cosigned By    Initials Name  Provider Type    RF Meron Lei, LIZETT Physical Therapist Assistant              Wound 09/21/22 1532 Right anterior knee Incision (Active)   Dressing Appearance open to air 11/21/22 2323   Closure Approximated 11/21/22 2323   Base dry;clean 11/22/22 0915   Periwound intact;dry 11/21/22 2323   Periwound Temperature warm 11/22/22 0915   Drainage Amount none 11/22/22 0915   Dressing Care open to air 11/22/22 0915   Periwound Care barrier ointment applied 11/21/22 2323       Wound 10/28/22 1827 Right coccyx Pressure Injury (Active)   Dressing Appearance open to air 11/22/22 0915   Closure Open to air 11/21/22 2323   Base non-blanchable;blanchable;pink 11/22/22 0915   Drainage Amount none 11/22/22 0915   Care, Wound barrier applied 11/22/22 0915   Dressing Care open to air 11/22/22 0915     Physical Therapy Education     Title: PT OT SLP Therapies (Done)     Topic: Physical Therapy (Done)     Point: Mobility training (Done)     Learning Progress Summary           Patient Acceptance, E,TB, VU by RF at 11/22/2022 1609    Acceptance, E,TB, VU by DL at 11/22/2022 0442    Acceptance, E,TB, VU by DL at 11/22/2022 0429    Acceptance, E,TB, VU by RF at 11/21/2022 1522    Acceptance, E,TB, VU,DU by HR at 11/18/2022 1546    Acceptance, E,TB, VU by RF at 11/18/2022 1525    Acceptance, E,TB, VU by RF at 11/17/2022 1528    Acceptance, E,TB, VU by DL at 11/17/2022 0233    Acceptance, E,TB, VU by RF at 11/16/2022 1606    Acceptance, E,TB, VU by DL at 11/15/2022 2033    Acceptance, E,TB, VU by RF at 11/15/2022 1541    Acceptance, E,TB, VU by DL at 11/15/2022 0305    Acceptance, E,TB, VU by RF at 11/14/2022 1528    Acceptance, E,TB, VU by RF at 11/10/2022 1551    Acceptance, E,TB, VU by RF at 11/9/2022 1622    Acceptance, E,TB, VU by DG at 11/9/2022 0050    Acceptance, E,TB, VU by RF at 11/8/2022 1539    Acceptance, E,TB, VU by DG at 11/8/2022 0114    Acceptance, E,TB, VU by RF at 11/7/2022 1607    Acceptance, E,TB, VU by DG at  11/6/2022 2023    Acceptance, E,TB, VU by RF at 11/4/2022 1551    Acceptance, E,TB, VU,DU by HR at 11/3/2022 1541    Acceptance, E,D, VU,NR by RG at 11/3/2022 1442    Acceptance, E,TB, VU by DG at 11/1/2022 2016    Acceptance, E,D, VU,NR by RG at 11/1/2022 1541    Acceptance, E,TB, VU by DG at 10/31/2022 2315    Acceptance, E,D, VU,NR by RG at 10/31/2022 1446                   Point: Home exercise program (Done)     Learning Progress Summary           Patient Acceptance, E,TB, VU by RF at 11/22/2022 1609    Acceptance, E,TB, VU by DL at 11/22/2022 0442    Acceptance, E,TB, VU by DL at 11/22/2022 0429    Acceptance, E,TB, VU by RF at 11/21/2022 1522    Acceptance, E,TB, VU,DU by HR at 11/18/2022 1546    Acceptance, E,TB, VU by RF at 11/18/2022 1525    Acceptance, E,TB, VU by RF at 11/17/2022 1528    Acceptance, E,TB, VU by DL at 11/17/2022 0233    Acceptance, E,TB, VU by RF at 11/16/2022 1606    Acceptance, E,TB, VU by DL at 11/15/2022 2033    Acceptance, E,TB, VU by RF at 11/15/2022 1541    Acceptance, E,TB, VU by DL at 11/15/2022 0305    Acceptance, E,TB, VU by RF at 11/14/2022 1528    Acceptance, E,TB, VU by RF at 11/10/2022 1551    Acceptance, E,TB, VU by RF at 11/9/2022 1622    Acceptance, E,TB, VU by DG at 11/9/2022 0050    Acceptance, E,TB, VU by RF at 11/8/2022 1539    Acceptance, E,TB, VU by DG at 11/8/2022 0114    Acceptance, E,TB, VU by RF at 11/7/2022 1607    Acceptance, E,TB, VU by DG at 11/6/2022 2023    Acceptance, E,TB, VU by RF at 11/4/2022 1551    Acceptance, E,TB, VU,DU by HR at 11/3/2022 1541    Acceptance, E,D, VU,NR by RG at 11/3/2022 1442    Acceptance, E,TB, VU by DG at 11/1/2022 2016    Acceptance, E,D, VU,NR by RG at 11/1/2022 1541    Acceptance, E,TB, VU by DG at 10/31/2022 2315    Acceptance, E,D, VU,NR by RG at 10/31/2022 1446                   Point: Body mechanics (Done)     Learning Progress Summary           Patient Acceptance, E,TB, VU by RF at 11/22/2022 1609    Acceptance, E,TB, VU  by DL at 11/22/2022 0442    Acceptance, E,TB, VU by DL at 11/22/2022 0429    Acceptance, E,TB, VU by RF at 11/21/2022 1522    Acceptance, E,TB, VU,DU by HR at 11/18/2022 1546    Acceptance, E,TB, VU by RF at 11/18/2022 1525    Acceptance, E,TB, VU by RF at 11/17/2022 1528    Acceptance, E,TB, VU by DL at 11/17/2022 0233    Acceptance, E,TB, VU by RF at 11/16/2022 1606    Acceptance, E,TB, VU by DL at 11/15/2022 2033    Acceptance, E,TB, VU by RF at 11/15/2022 1541    Acceptance, E,TB, VU by DL at 11/15/2022 0305    Acceptance, E,TB, VU by RF at 11/14/2022 1528    Acceptance, E,TB, VU by RF at 11/10/2022 1551    Acceptance, E,TB, VU by RF at 11/9/2022 1622    Acceptance, E,TB, VU by DG at 11/9/2022 0050    Acceptance, E,TB, VU by RF at 11/8/2022 1539    Acceptance, E,TB, VU by DG at 11/8/2022 0114    Acceptance, E,TB, VU by RF at 11/7/2022 1607    Acceptance, E,TB, VU by DG at 11/6/2022 2023    Acceptance, E,TB, VU by RF at 11/4/2022 1551    Acceptance, E,TB, VU,DU by HR at 11/3/2022 1541    Acceptance, E,D, VU,NR by RG at 11/3/2022 1442    Acceptance, E,TB, VU by DG at 11/1/2022 2016    Acceptance, E,D, VU,NR by RG at 11/1/2022 1541    Acceptance, E,TB, VU by DG at 10/31/2022 2315    Acceptance, E,D, VU,NR by RG at 10/31/2022 1446                   Point: Precautions (Done)     Learning Progress Summary           Patient Acceptance, E,TB, VU by RF at 11/22/2022 1609    Acceptance, E,TB, VU by DL at 11/22/2022 0442    Acceptance, E,TB, VU by DL at 11/22/2022 0429    Acceptance, E,TB, VU by RF at 11/21/2022 1522    Acceptance, E,TB, VU,DU by HR at 11/18/2022 1546    Acceptance, E,TB, VU by RF at 11/18/2022 1525    Acceptance, E,TB, VU by RF at 11/17/2022 1528    Acceptance, E,TB, VU by DL at 11/17/2022 0233    Acceptance, E,TB, VU by RF at 11/16/2022 1606    Acceptance, E,TB, VU by DL at 11/15/2022 2033    Acceptance, E,TB, VU by RF at 11/15/2022 1541    Acceptance, E,TB, VU by DL at 11/15/2022 0305    Acceptance,  E,TB, VU by RF at 11/14/2022 1528    Acceptance, E,TB, VU by RF at 11/10/2022 1551    Acceptance, E,TB, VU by RF at 11/9/2022 1622    Acceptance, E,TB, VU by DG at 11/9/2022 0050    Acceptance, E,TB, VU by RF at 11/8/2022 1539    Acceptance, E,TB, VU by DG at 11/8/2022 0114    Acceptance, E,TB, VU by RF at 11/7/2022 1607    Acceptance, E,TB, VU by DG at 11/6/2022 2023    Acceptance, E,TB, VU by RF at 11/4/2022 1551    Acceptance, E,TB, VU,DU by HR at 11/3/2022 1541    Acceptance, E,D, VU,NR by RG at 11/3/2022 1442    Acceptance, E,TB, VU by DG at 11/1/2022 2016    Acceptance, E,D, VU,NR by RG at 11/1/2022 1541    Acceptance, E,TB, VU by DG at 10/31/2022 2315    Acceptance, E,D, VU,NR by RG at 10/31/2022 1446                               User Key     Initials Effective Dates Name Provider Type Discipline     06/16/21 -  Phyllis Yañez, RN Registered Nurse Nurse     06/16/21 -  Meron Lei PTA Physical Therapist Assistant PT    RG 06/16/21 -  Michi Mckeon PTA Physical Therapist Assistant PT    HR 01/14/22 -  Hanna Murphy PTA Physical Therapist Assistant PT    DL 03/16/22 -  Hellen Mcdonnell, RN Registered Nurse Nurse                PT Recommendation and Plan    Frequency of Treatment (PT): 5 times per week, 90 minutes per session     Daily Progress Summary (PT)  Functional Goal Overall Progress (PT): progressing toward functional goals slower than expected  Daily Progress Summary (PT): Pt hindered by low back pain and R knee pain at this time. Fair functional mobility noted, however, assistance required for safety. Continued skilled care required for further improvements to ensure maximum safe function upon D/C.  Impairments Still Limiting Function (PT): functional activity tolerance impairment, pain, range of motion deficit, strength deficit  Recommendations (PT): Continue per current POC               Time Calculation:      PT Charges     Row Name 11/22/22 4189             Time Calculation    Start  Time 0915  -RF      Stop Time 1045  -RF      Time Calculation (min) 90 min  -RF      PT Received On 11/22/22  -RF      PT - Next Appointment 11/23/22  -RF      PT Goal Re-Cert Due Date 11/25/22  -RF         Time Calculation- PT    Total Timed Code Minutes- PT 90 minute(s)  -RF            User Key  (r) = Recorded By, (t) = Taken By, (c) = Cosigned By    Initials Name Provider Type    RF Meron Lei, LIZETT Physical Therapist Assistant                Therapy Charges for Today     Code Description Service Date Service Provider Modifiers Qty    89679072408 HC PT THER PROC EA 15 MIN 11/21/2022 Meron Lei, PTA GP, CQ 4    45490277719 HC PT THERAPEUTIC ACT EA 15 MIN 11/21/2022 Meron Lei, PTA GP, CQ 2    22646643206 HC PT THER PROC EA 15 MIN 11/22/2022 Meron Lei, PTA GP, CQ 2    34950811808 HC PT NEUROMUSC RE EDUCATION EA 15 MIN 11/22/2022 Meron Lei, PTA GP, CQ 2    83011322965 HC PT THERAPEUTIC ACT EA 15 MIN 11/22/2022 Meron Lei, PTA GP, CQ 2                   Merontoma Lei PTA  11/22/2022

## 2022-11-22 NOTE — PLAN OF CARE
Goal Outcome Evaluation:  Plan of Care Reviewed With: patient reports has MD visit tomorrow, he isn't very optimistic of  returning to this facility. Encouragement and education provided, will continue to monitor.

## 2022-11-22 NOTE — SIGNIFICANT NOTE
11/22/22 1000   Plan   Plan Team conference held today.  Pt is going to outpatient appointment with Dr. Brain Bentley, Orthopedic Surgeon,  at 3:15 pm today in [place].  Sister Edgar and brother-in-law Suhas White will transport him to this appointment.  MD plans to call Orthopedic Surgeon about pt's case.  Pt may not return to rehab after appointment but this has not been decided at this time.  Pt is aware.  Discussed assisting pt with SNF placement if he returns to rehab when he is medically stable.  Pt aware of private pay option for short-term SNF placement.   Patient/Family in Agreement with Plan yes

## 2022-11-22 NOTE — THERAPY TREATMENT NOTE
Inpatient Rehabilitation - Occupational Therapy Treatment Note    YVONNE Gaines     Patient Name: Melchor Hardwick III  : 1965  MRN: [id number]    Today's Date: 2022                 Admit Date: 10/28/2022         ICD-10-CM ICD-9-CM   1. Staphylococcal arthritis of right knee (HCC)  M00.061 711.06     041.10       Patient Active Problem List   Diagnosis   • Hyperlipidemia   • Hypertensive disorder   • Obesity   • Type 2 diabetes mellitus with hyperglycemia, with long-term current use of insulin (HCC)   • Gas gangrene of foot (HCC)   • Cellulitis in diabetic foot (HCC)   • Other acute osteomyelitis of left foot (HCC)   • Osteomyelitis (HCC)   • Critical illness myopathy   • Staphylococcal arthritis of right knee (HCC)   • Other cirrhosis of liver (HCC)   • MRSA bacteremia   • Endocarditis of tricuspid valve   • Wound of right buttock   • History of osteomyelitis   • Debility       Past Medical History:   Diagnosis Date   • Diabetes mellitus (HCC)    • Hyperlipidemia    • Hypertension    • Pyogenic arthritis of right knee joint, due to unspecified organism (HCC) 2022       Past Surgical History:   Procedure Laterality Date   • ABSCESS DRAINAGE      PERINEUM   • AMPUTATION FOOT Left 2022    Procedure: AMPUTATION FOOT;  Surgeon: Addison Colby MD;  Location: [facility] OR;  Service: Podiatry;  Laterality: Left;   • INCISION AND DRAINAGE LEG Left 05/10/2022    Procedure: INCISION AND DRAINAGE LOWER EXTREMITY;  Surgeon: Addison Colby MD;  Location: [facility] OR;  Service: Podiatry;  Laterality: Left;   • KNEE INCISION AND DRAINAGE Right 2022    Procedure: KNEE INCISION AND DRAINAGE RIGHT;  Surgeon: Brain Bentley MD;  Location: [facility] OR;  Service: Orthopedics;  Laterality: Right;   • WOUND DEBRIDEMENT Left 2022    Procedure: DEBRIDEMENT FOOT;  Surgeon: Addison Colby MD;  Location: [facility] OR;  Service: Podiatry;  Laterality: Left;             IRF OT ASSESSMENT FLOWSHEET (last 12 hours)      IRF OT Evaluation and Treatment     Row Name 11/22/22 1422          OT Time and Intention    Document Type daily treatment  -     Mode of Treatment individual therapy;occupational therapy  -KP     Total Minutes, Occupational Therapy 90  -KP     Patient Effort good  -KP     Symptoms Noted During/After Treatment none  -KP     Row Name 11/22/22 1422          General Information    Patient Profile Reviewed yes  -KP     General Observations of Patient Patient agreeable to therapy with no complaints. Medical appt with ortho out of facility later on this date.  -     Existing Precautions/Restrictions fall;brace worn when out of bed  -KP     Row Name 11/22/22 1422          Pain Assessment    Pretreatment Pain Rating 1/10  -KP     Posttreatment Pain Rating 1/10  -KP     Pain Location - Side/Orientation Right  -     Pain Location - knee  -KP     Row Name 11/22/22 1422          Cognition/Psychosocial    Affect/Mental Status (Cognition) WFL  -     Orientation Status (Cognition) oriented x 4  -KP     Follows Commands (Cognition) WFL  -     Personal Safety Interventions gait belt;fall prevention program maintained;nonskid shoes/slippers when out of bed  -     Cognitive Function WFL  -     Row Name 11/22/22 1422          Bathing    [place] Level (Bathing) set up;bathing skills  -     Position (Bathing) unsupported sitting;edge of bed sitting  -     Set-up Assistance (Bathing) obtain supplies  -     Row Name 11/22/22 1422          Upper Body Dressing    [place] Level (Upper Body Dressing) upper body dressing skills;set up assistance  -     Position (Upper Body Dressing) edge of bed sitting;supine  -     Row Name 11/22/22 1422          Lower Body Dressing    [place] Level (Lower Body Dressing) don;shoes/slippers;socks;minimum assist (75% patient effort)  -     Assistive Device Use (Lower Body Dressing) reacher  -     Comment (Lower Body Dressing) assist with RLE  -KP     Row Name 11/22/22  1422          Grooming    [place] Level (Grooming) grooming skills;set up  -KP     Position (Grooming) supported sitting  -KP     Row Name 11/22/22 1422          Toileting    [place] Level (Toileting) toileting skills;adjust/manage clothing;perform perineal hygiene;minimum assist (75% patient effort)  -KP     Comment (Toileting) assist with hygiene for thoroughness after BM  -KP     Row Name 11/22/22 1422          Self-Feeding    [place] Level (Self-Feeding) feeding skills;set up  -KP     Position (Self-Feeding) supported sitting  -KP     Row Name 11/22/22 1422          Bed-Chair Transfer    Bed-Chair [place] (Transfers) contact guard;standby assist  -KP     Assistive Device (Bed-Chair Transfers) walker, front-wheeled  -KP     Row Name 11/22/22 1422          Chair-Bed Transfer    Chair-Bed [place] (Transfers) contact guard;standby assist  -KP     Assistive Device (Chair-Bed Transfers) wheelchair;walker, front-wheeled  -KP     Row Name 11/22/22 1422          Toilet Transfer    Type (Toilet Transfer) stand pivot/stand step  -KP     [place] Level (Toilet Transfer) contact guard  -KP     Assistive Device (Toilet Transfer) wheelchair;grab bars/safety frame  -     Row Name 11/22/22 1422          Motor Skills    Functional Endurance fair plus  -KP     Motor Control/Coordination Interventions therapeutic exercise/ROM  functional reach with distal strengthening/coordination activity, BUE PRE X20 minutes, tabletop bimanual functional reach task  -KP     Row Name 11/22/22 1422          Positioning and Restraints    Pre-Treatment Position in bed  -KP     Post Treatment Position wheelchair  -KP     In Wheelchair sitting;with PT  -KP     Row Name 11/22/22 1422          Daily Progress Summary (OT)    Overall Progress Toward Functional Goals (OT) progressing toward functional goals as expected  -           User Key  (r) = Recorded By, (t) = Taken By, (c) = Cosigned By    Initials Name Effective  Dates    Darcy Tineo, OT 06/16/21 -                  Occupational Therapy Education     Title: PT OT SLP Therapies (Done)     Topic: Occupational Therapy (Done)     Point: ADL training (Done)     Description:   Instruct learner(s) on proper safety adaptation and remediation techniques during self care or transfers.   Instruct in proper use of assistive devices.              Learning Progress Summary           Patient Acceptance, E,TB, VU by DL at 11/22/2022 0442    Acceptance, E,TB, VU by DL at 11/22/2022 0429    Acceptance, E, VU,NR by HB at 11/18/2022 1211    Acceptance, E,TB,D, VU,DU,NR by LA at 11/17/2022 1423    Acceptance, E,TB, VU by DL at 11/17/2022 0233    Acceptance, E, VU,NR by HB at 11/16/2022 1425    Acceptance, E,TB, VU by DL at 11/15/2022 2033    Acceptance, E, VU,NR by HB at 11/15/2022 1242    Acceptance, E,TB, VU by DL at 11/15/2022 0305    Acceptance, E,TB,D, VU,DU,NR by LA at 11/11/2022 1248    Acceptance, E, VU,NR by BF at 11/10/2022 1431    Acceptance, E, VU,NR by HB at 11/9/2022 1230    Acceptance, E,TB, VU by DG at 11/9/2022 0050    Acceptance, E, VU,NR by HB at 11/8/2022 1426    Acceptance, E,TB, VU by DG at 11/8/2022 0114    Acceptance, E, VU,NR by HB at 11/7/2022 1155    Acceptance, E,TB, VU by DG at 11/6/2022 2023    Acceptance, E, VU,NR by HB at 11/4/2022 1153    Acceptance, E, VU,NR by HB at 11/3/2022 1428    Acceptance, E,TB, VU by DG at 11/1/2022 2016    Acceptance, E,TB, VU by DG at 10/31/2022 2315    Acceptance, E, VU,NR by BF at 10/31/2022 1429    Acceptance, E,TB, VU by DL at 10/31/2022 0101    Acceptance, E,TB, VU by DL at 10/29/2022 2321    Acceptance, E, VU,NR by HB at 10/29/2022 1137                   Point: Home exercise program (Done)     Description:   Instruct learner(s) on appropriate technique for monitoring, assisting and/or progressing therapeutic exercises/activities.              Learning Progress Summary           Patient Acceptance, E,TB, VU by DL at  11/22/2022 0442    Acceptance, E,TB, VU by DL at 11/22/2022 0429    Acceptance, E, VU,NR by HB at 11/18/2022 1211    Acceptance, E,TB,D, VU,DU,NR by LA at 11/17/2022 1423    Acceptance, E,TB, VU by DL at 11/17/2022 0233    Acceptance, E, VU,NR by HB at 11/16/2022 1425    Acceptance, E,TB, VU by DL at 11/15/2022 2033    Acceptance, E, VU,NR by HB at 11/15/2022 1242    Acceptance, E,TB, VU by DL at 11/15/2022 0305    Acceptance, E,TB,D, VU,DU,NR by LA at 11/11/2022 1248    Acceptance, E, VU,NR by BF at 11/10/2022 1431    Acceptance, E, VU,NR by HB at 11/9/2022 1230    Acceptance, E,TB, VU by DG at 11/9/2022 0050    Acceptance, E, VU,NR by HB at 11/8/2022 1426    Acceptance, E,TB, VU by DG at 11/8/2022 0114    Acceptance, E, VU,NR by HB at 11/7/2022 1155    Acceptance, E,TB, VU by DG at 11/6/2022 2023    Acceptance, E, VU,NR by HB at 11/4/2022 1153    Acceptance, E, VU,NR by HB at 11/3/2022 1428    Acceptance, E,TB, VU by DG at 11/1/2022 2016    Acceptance, E,TB, VU by DG at 10/31/2022 2315    Acceptance, E,TB, VU by DL at 10/31/2022 0101    Acceptance, E,TB, VU by DL at 10/29/2022 2321    Acceptance, E, VU,NR by HB at 10/29/2022 1137                   Point: Precautions (Done)     Description:   Instruct learner(s) on prescribed precautions during self-care and functional transfers.              Learning Progress Summary           Patient Acceptance, E,TB, VU by DL at 11/22/2022 0442    Acceptance, E,TB, VU by DL at 11/22/2022 0429    Acceptance, E, VU,NR by HB at 11/18/2022 1211    Acceptance, E,TB,D, VU,DU,NR by LA at 11/17/2022 1423    Acceptance, E,TB, VU by DL at 11/17/2022 0233    Acceptance, E, VU,NR by HB at 11/16/2022 1425    Acceptance, E,TB, VU by DL at 11/15/2022 2033    Acceptance, E, VU,NR by HB at 11/15/2022 1242    Acceptance, E,TB, VU by DL at 11/15/2022 0305    Acceptance, E,TB,D, VU,DU,NR by LA at 11/11/2022 1248    Acceptance, E, VU,NR by HB at 11/9/2022 1230    Acceptance, E,TB, VU by DG at  11/9/2022 0050    Acceptance, E, VU,NR by HB at 11/8/2022 1426    Acceptance, E,TB, VU by DG at 11/8/2022 0114    Acceptance, E, VU,NR by HB at 11/7/2022 1155    Acceptance, E,TB, VU by DG at 11/6/2022 2023    Acceptance, E, VU,NR by HB at 11/4/2022 1153    Acceptance, E, VU,NR by HB at 11/3/2022 1428    Acceptance, E,TB, VU by DG at 11/1/2022 2016    Acceptance, E,TB, VU by DG at 10/31/2022 2315    Acceptance, E, VU,NR by BF at 10/31/2022 1429    Acceptance, E,TB, VU by DL at 10/31/2022 0101    Acceptance, E,TB, VU by DL at 10/29/2022 2321    Acceptance, E, VU,NR by HB at 10/29/2022 1137                   Point: Body mechanics (Done)     Description:   Instruct learner(s) on proper positioning and spine alignment during self-care, functional mobility activities and/or exercises.              Learning Progress Summary           Patient Acceptance, E,TB, VU by DL at 11/22/2022 0442    Acceptance, E,TB, VU by DL at 11/22/2022 0429    Acceptance, E, VU,NR by HB at 11/18/2022 1211    Acceptance, E,TB,D, VU,DU,NR by LA at 11/17/2022 1423    Acceptance, E,TB, VU by DL at 11/17/2022 0233    Acceptance, E, VU,NR by HB at 11/16/2022 1425    Acceptance, E,TB, VU by DL at 11/15/2022 2033    Acceptance, E, VU,NR by HB at 11/15/2022 1242    Acceptance, E,TB, VU by DL at 11/15/2022 0305    Acceptance, E,TB,D, VU,DU,NR by LA at 11/11/2022 1248    Acceptance, E, VU,NR by HB at 11/9/2022 1230    Acceptance, E,TB, VU by DG at 11/9/2022 0050    Acceptance, E, VU,NR by HB at 11/8/2022 1426    Acceptance, E,TB, VU by DG at 11/8/2022 0114    Acceptance, E, VU,NR by HB at 11/7/2022 1155    Acceptance, E,TB, VU by DG at 11/6/2022 2023    Acceptance, E, VU,NR by HB at 11/4/2022 1153    Acceptance, E, VU,NR by HB at 11/3/2022 1428    Acceptance, E,TB, VU by DG at 11/1/2022 2016    Acceptance, E,TB, VU by DG at 10/31/2022 2315    Acceptance, E,TB, VU by DL at 10/31/2022 0101    Acceptance, E,TB, VU by DL at 10/29/2022 2321    Acceptance, E,  VU,NR by  at 10/29/2022 1137                               User Key     Initials Effective Dates Name Provider Type Discipline    DG 06/16/21 -  Phyllis Yañez, RN Registered Nurse Nurse    BF 06/16/21 -  Mandi Smith OT Occupational Therapist OT    HB 05/25/21 -  Dorothy Rosas OT Occupational Therapist OT    LA 02/14/22 -  Jaqueline Bautista OT Occupational Therapist OT    DL 03/16/22 -  Hellen Mcdonnell, RN Registered Nurse Nurse                    OT Recommendation and Plan            Daily Progress Summary (OT)  Overall Progress Toward Functional Goals (OT): progressing toward functional goals as expected            Time Calculation:      Time Calculation- OT     Row Name 11/22/22 1426             Time Calculation- OT    OT Start Time 0750  -      OT Stop Time 0920  -      OT Time Calculation (min) 90 min  -      Total Timed Code Minutes- OT 90 minute(s)  -            User Key  (r) = Recorded By, (t) = Taken By, (c) = Cosigned By    Initials Name Provider Type     Darcy Gramajo, OT Occupational Therapist              Therapy Charges for Today     Code Description Service Date Service Provider Modifiers Qty    56513180248 HC OT SELF CARE/MGMT/TRAIN EA 15 MIN 11/21/2022 Darcy Gramajo, OT GO 1    29560890334 HC OT THERAPEUTIC ACT EA 15 MIN 11/21/2022 Darcy Gramajo, OT GO 2    27271946784 HC OT THER PROC EA 15 MIN 11/21/2022 Darcy Gramajo, OT GO 3    63092611349 HC OT SELF CARE/MGMT/TRAIN EA 15 MIN 11/22/2022 Darcy Gramajo OT GO 2    29261379576 HC OT THERAPEUTIC ACT EA 15 MIN 11/22/2022 Darcy Gramajo OT GO 2    38207146744 HC OT THER PROC EA 15 MIN 11/22/2022 Darcy Gramajo OT GO 2                   Darcy Gramajo OT  11/22/2022

## 2022-11-22 NOTE — SIGNIFICANT NOTE
11/21/22 1600   Plan   Plan SS spoke to pt about plans for sister and brother-in-law to come to rehab on 11-22-22 around 12:45 pm to transport him to outpatient appointment with Dr. Brain Bentley, Orthopedic Surgeon at 3:15 pm in [place].  Explained MD will determine plans after pt is seen in the office.  Pt aware if he returns to rehab SS can assist him with SNF placement since he is unable to return to sister Ros's home at discharge.  Will follow.   Patient/Family in Agreement with Plan yes

## 2022-11-22 NOTE — PROGRESS NOTES
Case Management  Inpatient Rehabilitation Team Conference    Conference Date/Time: 11/22/2022 12:47:52 PM    Team Conference Attendees:  MD Ave Coughlin SW Jessica Bill, RN,   JD Wilkerson, PT  Darcy Gramajo OT    Demographics            Age: 57Y            Gender: Male    Admission Date: 10/28/2022 5:02:00 PM  Rehabilitation Diagnosis:  debility  Comorbidities: M00.9 Pyogenic arthritis, unspecified  D69.6 Thrombocytopenia, unspecified  E11.42 Type 2 diabetes mellitus with diabetic polyneuropathy  B95.62 Methicillin resistant Staphylococcus aureus infection as the cause of  diseases classified elsewhere  D64.9 Anemia, unspecified  E03.9 Hypothyroidism, unspecified  I10 Essential (primary) hypertension  K74.60 Unspecified cirrhosis of liver  K75.81 Nonalcoholic steatohepatitis (PAZ)  Z79.4 Long term (current) use of insulin  Z89.432 Acquired absence of left foot      Plan of Care  Anticipated Discharge Date/Estimated Length of Stay: 11-22-22  Anticipated Discharge Destination: Community discharge with assistance  Discharge Plan : Pt plans to return home with sister and brother-in-law at  discharge.  Medical Necessity Expected Level Rationale: good  Intensity and Duration: an average of 3 hours/5 days per week  Medical Supervision and 24 Hour Rehab Nursing: x  Physical Therapy: x  PT Intensity/Duration: PT 1.5 hours per day/5 days per week  Occupational Therapy: x  OT Intensity/Duration: OT 1.5 hours per day/5 days per week  Social Work: x  Therapeutic Recreation: x  Updated (if changes indicated)    Anticipated Discharge Date/Estimated Length of Stay:   Pending      Discharge Plan of Care:    Based on the patient's medical and functional status, their prognosis and  expected level of functional improvement is: good      Interdisciplinary Problem/Goals/Status  Body Function Structure    [RN] Skin Integrity(Active)  Current Status(10/28/2022): impaired skin  integrity  Weekly Goal(11/22/2022): improved skin integrity  Discharge Goal: wound healing        Mobility    [PT] Bed/Chair/Wheelchair(Active)  Current Status(10/29/2022): MI  Weekly Goal(11/05/2022): MI/I  Discharge Goal: I    [PT] Walk(Active)  Current Status(10/29/2022): N/A d/t ortho hypo  Weekly Goal(11/05/2022): 15' RW CGA  Discharge Goal: 30' RW SBA/MI        Pain    [RN] Pain Management(Active)  Current Status(10/28/2022): potential for increased pain  Weekly Goal(11/22/2022): pain at a tolerable level  Discharge Goal: no pain        Safety    [RN] Potential for Injury(Active)  Current Status(10/28/2022): potential for falls  Weekly Goal(11/22/2022): no falls  Discharge Goal: no falls        Self Care    [OT] Dressing (Lower)(Active)  Current Status(11/21/2022): min  Weekly Goal(11/29/2022): SBA  Discharge Goal: SBA    Comments: Pt is going to outpatient appointment with Orthopedic Surgeon in  [place] today for right knee aspiration and MD will decide plans after this  procedure.  Pt is unable to return home with sister and brother-in-law at  discharge, therefore, SNF placement will be needed.  Pt may not return to rehab  if he needs admission to [facility].    Signed by: KAREN Calderon    Physician CoSigned By: Ja Bowers 11/22/2022 18:07:01

## 2022-11-22 NOTE — PROGRESS NOTES
PROGRESS NOTE         Patient Identification:  Name:  Melchor Hardwick III  Age:  57 y.o.  Sex:  male  :  1965  MRN:  [id number]  Visit Number:  [id number]  Primary Care Provider:  Missy Up APRN         LOS: 25 days       ----------------------------------------------------------------------------------------------------------------------  Subjective       Chief Complaints:    No chief complaint on file.        Interval History:      The patient is sitting up in a wheelchair on room air in no apparent distress.  Participating in physical therapy this morning.  Right knee appears more swollen, warm, and tender this morning and there is significant decreased range of motion.  Otherwise, patient has no new complaints at this time.  Scheduled for orthopedic appointment in [place] later today.  Afebrile, no diarrhea.  WBC remains normal at 3.87.  1 out of 2 blood cultures on 2022 is so far showing MRSA.  Repeat blood cultures are in progress.    Review of Systems:    Constitutional: no fever, chills and night sweats.  Generalized fatigue.  Eyes: no eye drainage, itching or redness.  HEENT: no mouth sores, dysphagia or nose bleed.  Respiratory: no for shortness of breath, cough or production of sputum.  Cardiovascular: no chest pain, no palpitations, no orthopnea.  Gastrointestinal: no nausea, vomiting or diarrhea. No abdominal pain, hematemesis or rectal bleeding.  Genitourinary: no dysuria or polyuria.  Hematologic/lymphatic: no lymph node abnormalities, no easy bruising or easy bleeding.  Musculoskeletal: no muscle or joint pain.  Right knee pain, swelling, warmth and redness.  Chronic back spasms.  Skin: No rash and no itching.  Neurological: no loss of consciousness, no seizure, no headache.  Behavioral/Psych: no depression or suicidal ideation.  Endocrine: no hot flashes.  Immunologic:  negative.    ----------------------------------------------------------------------------------------------------------------------      Objective       \[facility]\"" Meds:  ascorbic acid, 500 mg, Oral, Daily  aspirin, 81 mg, Oral, Daily  ceftaroline, 600 mg, Intravenous, Q8H  DAPTOmycin, 6 mg/kg (Adjusted), Intravenous, Q24H  Diclofenac Sodium, 4 g, Topical, 4x Daily  docusate sodium, 100 mg, Oral, BID  fludrocortisone, 50 mcg, Oral, Daily  fondaparinux, 2.5 mg, Subcutaneous, Q24H  Insulin Aspart, 0-14 Units, Subcutaneous, TID AC  Insulin Aspart, 4 Units, Subcutaneous, TID AC  insulin detemir, 22 Units, Subcutaneous, Q12H  iron polysaccharides, 150 mg, Oral, Daily  levothyroxine, 25 mcg, Oral, Q AM  lidocaine PF 2%, 10 mL, Injection, Once  midodrine, 7.5 mg, Oral, TID AC  multivitamin with minerals, 1 tablet, Oral, Daily  pantoprazole, 40 mg, Oral, Q AM  polyethylene glycol, 17 g, Oral, Daily  pregabalin, 100 mg, Oral, Q12H      Pharmacy Consult,       ----------------------------------------------------------------------------------------------------------------------    Vital Signs:  Temp:  [99.9 °F (37.7 °C)] 99.9 °F (37.7 °C)  Heart Rate:  [] 103  Resp:  [20] 20  BP: (118-153)/(64-82) 118/64  No data found.  SpO2 Percentage    11/20/22 0910 11/20/22 1900 11/21/22 1900   SpO2: 93% 98% 97%     SpO2:  [97 %] 97 %  on   ;   Device (Oxygen Therapy): room air    Body mass index is 28.34 kg/m².  Wt Readings from Last 3 Encounters:   11/01/22 89.6 kg (197 lb 8.5 oz)   09/23/22 98 kg (216 lb)   06/17/22 115 kg (254 lb)        Intake/Output Summary (Last 24 hours) at 11/22/2022 1005  Last data filed at 11/22/2022 0918  Gross per 24 hour   Intake 700 ml   Output 1500 ml   Net -800 ml     Diet: Texture: Regular Texture (IDDSI 7); Fluid Consistency: Regular Consistency; Diabetic Diets; Consistent  Carbohydrate  ----------------------------------------------------------------------------------------------------------------------      Physical Exam:    Constitutional: Chronically ill-appearing male is sitting up in a wheelchair on room air in no apparent distress.  Participating in physical therapy and appears comfortable.  HENT:  Head: Normocephalic and atraumatic.  Mouth:  Moist mucous membranes.    Cardiovascular:  Normal rate, regular rhythm and normal heart sounds with no murmur. No edema.  Pulmonary/Chest:  No respiratory distress, no wheezes, no crackles, with normal breath sounds and good air movement.  Abdominal:  Soft.  Bowel sounds are normal.  No distension and no tenderness.   Musculoskeletal:  No edema, no tenderness, and no deformity.  No swelling or redness of joints.  Neurological:  Alert and oriented to person, place, and time.  No facial droop.  No slurred speech.   Skin:  Skin is warm and dry.  No rash noted.  No pallor.  Right knee surgical incision with good healing and staples removed.  Right knee appears more swollen, warm, and tender.  There is significant decreased range of motion.  Psychiatric:  Normal mood and affect.  Behavior is normal.    ----------------------------------------------------------------------------------------------------------------------  Results from last 7 days   Lab Units 11/20/22  2358   CK TOTAL U/L 23           Results from last 7 days   Lab Units 11/22/22  0117 11/21/22  0723 11/20/22 2358 11/20/22  0452 11/18/22  0300 11/17/22  0131   CRP mg/dL  --   --  7.32*  --  8.38* 6.73*   WBC 10*3/mm3 3.87 3.59 3.79   < > 2.84* 3.11*   HEMOGLOBIN g/dL 9.1* 10.3* 9.1*   < > 9.1* 8.7*   HEMATOCRIT % 28.9* 33.2* 29.3*   < > 29.4* 27.4*   MCV fL 82.8 84.1 81.8   < > 83.3 82.8   MCHC g/dL 31.5 31.0* 31.1*   < > 31.0* 31.8   PLATELETS 10*3/mm3 126* 126* 119*   < > 104* 109*   INR   --   --   --   --  1.18*  --     < > = values in this interval not displayed.     Results  from last 7 days   Lab Units 11/22/22  0117 11/20/22  2358 11/20/22  0452   SODIUM mmol/L 132* 131* 135*   POTASSIUM mmol/L 4.2 4.0 3.8   CHLORIDE mmol/L 99 99 100   CO2 mmol/L 23.9 22.5 25.5   BUN mg/dL 12 9 8   CREATININE mg/dL 0.81 0.64* 0.57*   CALCIUM mg/dL 7.8* 7.9* 8.2*   GLUCOSE mg/dL 272* 291* 139*   Estimated Creatinine Clearance: 113.3 mL/min (by C-G formula based on SCr of 0.81 mg/dL).  No results found for: AMMONIA    Glucose   Date/Time Value Ref Range Status   11/22/2022 0636 259 (H) 70 - 130 mg/dL Final     Comment:     Meter: [id number] : [id number] YAMILET NATAN   11/21/2022 2047 268 (H) 70 - 130 mg/dL Final     Comment:     Meter: [id number] : [id number] YAMILET NATAN   11/21/2022 1618 242 (H) 70 - 130 mg/dL Final     Comment:     Meter: [id number] : [id number] SAEZ JUAN CARLOS   11/21/2022 1030 266 (H) 70 - 130 mg/dL Final     Comment:     Meter: [id number] : [id number] Audrey Mcginnis   11/21/2022 0626 219 (H) 70 - 130 mg/dL Final     Comment:     Meter: [id number] : [id number] Susi Velez   11/20/2022 1603 260 (H) 70 - 130 mg/dL Final     Comment:     Meter: [id number] : [id number] Audrey Mcginnis   11/20/2022 1042 289 (H) 70 - 130 mg/dL Final     Comment:     Meter: [id number] : [id number] Audrey Mcginnis   11/20/2022 0604 143 (H) 70 - 130 mg/dL Final     Comment:     Meter: [id number] : [id number] Tracey Crystal     Lab Results   Component Value Date    HGBA1C 8.80 (H) 09/20/2022     Lab Results   Component Value Date    TSH 2.390 10/10/2022    FREET4 0.87 (L) 10/10/2022       Blood Culture   Date Value Ref Range Status   10/24/2022 No growth at 5 days  Final   10/24/2022 No growth at 5 days  Final     No results found for: URINECX  No results found for: WOUNDCX  No results found for: STOOLCX  No results found for: RESPCX  Pain Management Panel     Pain Management Panel Latest Ref Rng & Units 5/24/2022 5/11/2022    CREATININE UR mg/dL 91.0 -    AMPHETAMINES  SCREEN, URINE Negative - Negative    BARBITURATES SCREEN Negative - Negative    BENZODIAZEPINE SCREEN, URINE Negative - Negative    BUPRENORPHINEUR Negative - Negative    COCAINE SCREEN, URINE Negative - Negative    METHADONE SCREEN, URINE Negative - Negative    METHAMPHETAMINEUR Negative - Negative            ----------------------------------------------------------------------------------------------------------------------  Imaging Results (Last 24 Hours)     ** No results found for the last 24 hours. **          ----------------------------------------------------------------------------------------------------------------------    Pertinent Infectious Disease Results    COVID-19 PCR from 10/28/2022 negative.  Blood cultures from 10/18/2022 2 out of 2 sets positive for MRSA.  Blood cultures from 10/20/2022 2 out of 2 sets positive for MRSA.  Blood cultures from 10/22/2022 1 out of 2 sets positive for MRSA.  Blood cultures from 10/24/2022 finalized as no growth. Blood cultures on 11/12/2022 finalized as no growth. 2D echo from 10/24/2022 reports no evidence of vegetation.  Updated TYRESE on 11/1/2022 showed a moderate sized echodense structure on the posterior tricuspid leaflet but not attached to the leaflet.  The appearance and location are not typical for regurgitation.  This structure could be a normal variant.  No evidence of tricuspid valve stenosis or significant regurgitations.  Other valves also appear normal in structure with no evidence of stenosis or significant regurgitations.  No vegetation seen on these valves. Right lower extremity venous duplex negative for DVT.  Bilateral upper extremity venous duplex negative for DVT.     CT right knee without contrast on 11/17/2022 showed a large joint effusion again.  Suprapatellar region collection appears slightly larger now measuring 10.3 x 3.3 cm in axial dimension and was 8.8 x 2.8 cm.  The collection again shows heterogenous mixed density.  Increasing  lytic lucency at the lateral tibial plateau could represent osteomyelitis.  Blood cultures on 11/12/2022 finalized as no growth.    CPK on 11/20/2022 was normal at 23.  Urinalysis on 11/21/2022 was negative.  Chest x-ray on 11/21/2022 showed redemonstrated elevation of the right hemidiaphragm with mild right basilar atelectasis/infiltrate.      Assessment/Plan       Assessment     Right knee septic arthritis  MRSA bacteremia  Possible endocarditis    Plan      I have seen and examined the patient myself this morning and discussed the plan of care with Sara Sales PA-C and here are my findings:    When I saw the patient this morning he was in bed and seems to be comfortable with no acute distress and was getting ready to have breakfast and participate with physical therapy.    Right knee appears to be more swollen warm and tender this morning with decreased range of motion and patient is scheduled to have an orthopedic evaluation in [place] today.    Blood cultures 1 set out of 2 on 11/20/2022 so far showing growth of MRSA and repeat blood cultures from this morning are in progress.    I do believe his persistent MRSA bacteremia is related to his persistent and worsening right knee infection that needs to be explored surgically with intraoperative washout and intraoperative cultures and I went ahead and continue with daptomycin for now and initiated ceftaroline 600 mg IV every 8 hours for his recurrent relapsing MRSA bacteremia.    Patient needs to have his right knee explored by orthopedic surgery and washed out in the OR as soon as possible.    Code Status:   Code Status and Medical Interventions:   Ordered at: 11/03/22 1015     Level Of Support Discussed With:    Patient     Code Status (Patient has no pulse and is not breathing):    CPR (Attempt to Resuscitate)     Medical Interventions (Patient has pulse or is breathing):    Full Support     Release to patient:    Routine Release       Sara Sales  SPEEDY  11/22/22  10:05 EST     Electronically signed by Sara Sales PA-C, 11/22/22, 10:09 AM EST.    Electronically signed by Marilynn Giordano MD, 11/22/22, 10:29 AM EST.

## 2022-11-22 NOTE — PROGRESS NOTES
Patient was seen prior to going to [place].  The knee looked about the same as yesterday.  He was undergoing physical therapy.  Patient went to [place], I did discuss with his orthopedic surgeon , he did aspirate the knee but only 5 cc of really bloody fluid came out.  This was not iris pus.  He did agree the patient is probably going to need a spacer.  This being the case he is scheduled this for next Wednesday, he recommended continuing IV antibiotics at this time.  He did not feel like this was an acute urgent need for operative intervention. Paient again with pos blood cultures, started on Teflaro by ID in addition to Dapto.   Patient has not back from his appointment, glucoses not well controlled but I will adjust insulin again tomorrow depending on glucoses overnight

## 2022-11-23 LAB
ANION GAP SERPL CALCULATED.3IONS-SCNC: 10.3 MMOL/L (ref 5–15)
BACTERIA BLD CULT: ABNORMAL
BASOPHILS # BLD AUTO: 0 10*3/MM3 (ref 0–0.2)
BASOPHILS NFR BLD AUTO: 0 % (ref 0–1.5)
BOTTLE TYPE: ABNORMAL
BUN SERPL-MCNC: 12 MG/DL (ref 6–20)
BUN/CREAT SERPL: 17.6 (ref 7–25)
CALCIUM SPEC-SCNC: 8.8 MG/DL (ref 8.6–10.5)
CHLORIDE SERPL-SCNC: 97 MMOL/L (ref 98–107)
CO2 SERPL-SCNC: 25.7 MMOL/L (ref 22–29)
CREAT SERPL-MCNC: 0.68 MG/DL (ref 0.76–1.27)
CRP SERPL-MCNC: 9.45 MG/DL (ref 0–0.5)
DEPRECATED RDW RBC AUTO: 49.9 FL (ref 37–54)
EGFRCR SERPLBLD CKD-EPI 2021: 108.4 ML/MIN/1.73
EOSINOPHIL # BLD AUTO: 0.1 10*3/MM3 (ref 0–0.4)
EOSINOPHIL NFR BLD AUTO: 3.1 % (ref 0.3–6.2)
ERYTHROCYTE [DISTWIDTH] IN BLOOD BY AUTOMATED COUNT: 16.4 % (ref 12.3–15.4)
GLUCOSE BLDC GLUCOMTR-MCNC: 125 MG/DL (ref 70–130)
GLUCOSE BLDC GLUCOMTR-MCNC: 149 MG/DL (ref 70–130)
GLUCOSE BLDC GLUCOMTR-MCNC: 235 MG/DL (ref 70–130)
GLUCOSE BLDC GLUCOMTR-MCNC: 237 MG/DL (ref 70–130)
GLUCOSE SERPL-MCNC: 121 MG/DL (ref 65–99)
HCT VFR BLD AUTO: 33.2 % (ref 37.5–51)
HGB BLD-MCNC: 10.5 G/DL (ref 13–17.7)
IMM GRANULOCYTES # BLD AUTO: 0.01 10*3/MM3 (ref 0–0.05)
IMM GRANULOCYTES NFR BLD AUTO: 0.3 % (ref 0–0.5)
LYMPHOCYTES # BLD AUTO: 0.59 10*3/MM3 (ref 0.7–3.1)
LYMPHOCYTES NFR BLD AUTO: 18 % (ref 19.6–45.3)
MCH RBC QN AUTO: 26.1 PG (ref 26.6–33)
MCHC RBC AUTO-ENTMCNC: 31.6 G/DL (ref 31.5–35.7)
MCV RBC AUTO: 82.4 FL (ref 79–97)
MONOCYTES # BLD AUTO: 0.26 10*3/MM3 (ref 0.1–0.9)
MONOCYTES NFR BLD AUTO: 8 % (ref 5–12)
NEUTROPHILS NFR BLD AUTO: 2.31 10*3/MM3 (ref 1.7–7)
NEUTROPHILS NFR BLD AUTO: 70.6 % (ref 42.7–76)
NRBC BLD AUTO-RTO: 0 /100 WBC (ref 0–0.2)
PLATELET # BLD AUTO: 141 10*3/MM3 (ref 140–450)
PMV BLD AUTO: 9.7 FL (ref 6–12)
POTASSIUM SERPL-SCNC: 3.6 MMOL/L (ref 3.5–5.2)
RBC # BLD AUTO: 4.03 10*6/MM3 (ref 4.14–5.8)
SODIUM SERPL-SCNC: 133 MMOL/L (ref 136–145)
WBC NRBC COR # BLD: 3.27 10*3/MM3 (ref 3.4–10.8)

## 2022-11-23 PROCEDURE — 63710000001 INSULIN DETEMIR PER 5 UNITS: Performed by: INTERNAL MEDICINE

## 2022-11-23 PROCEDURE — 99308 SBSQ NF CARE LOW MDM 20: CPT | Performed by: PHYSICIAN ASSISTANT

## 2022-11-23 PROCEDURE — 87040 BLOOD CULTURE FOR BACTERIA: CPT | Performed by: PHYSICIAN ASSISTANT

## 2022-11-23 PROCEDURE — 97530 THERAPEUTIC ACTIVITIES: CPT

## 2022-11-23 PROCEDURE — 97535 SELF CARE MNGMENT TRAINING: CPT

## 2022-11-23 PROCEDURE — 25010000002 DAPTOMYCIN PER 1 MG: Performed by: FAMILY MEDICINE

## 2022-11-23 PROCEDURE — 97110 THERAPEUTIC EXERCISES: CPT

## 2022-11-23 PROCEDURE — 86140 C-REACTIVE PROTEIN: CPT | Performed by: INTERNAL MEDICINE

## 2022-11-23 PROCEDURE — 97116 GAIT TRAINING THERAPY: CPT

## 2022-11-23 PROCEDURE — 25010000002 CEFTAROLINE FOSAMIL PER 10 MG: Performed by: PHYSICIAN ASSISTANT

## 2022-11-23 PROCEDURE — 97112 NEUROMUSCULAR REEDUCATION: CPT

## 2022-11-23 PROCEDURE — 63710000001 INSULIN ASPART PER 5 UNITS: Performed by: INTERNAL MEDICINE

## 2022-11-23 PROCEDURE — 25010000002 CEFTAROLINE FOSAMIL PER 10 MG: Performed by: INTERNAL MEDICINE

## 2022-11-23 PROCEDURE — 82962 GLUCOSE BLOOD TEST: CPT

## 2022-11-23 PROCEDURE — 85025 COMPLETE CBC W/AUTO DIFF WBC: CPT | Performed by: INTERNAL MEDICINE

## 2022-11-23 PROCEDURE — 80048 BASIC METABOLIC PNL TOTAL CA: CPT | Performed by: INTERNAL MEDICINE

## 2022-11-23 PROCEDURE — 25010000002 FONDAPARINUX PER 0.5 MG: Performed by: FAMILY MEDICINE

## 2022-11-23 RX ADMIN — CEFTAROLINE FOSAMIL 600 MG: 600 POWDER, FOR SOLUTION INTRAVENOUS at 12:45

## 2022-11-23 RX ADMIN — INSULIN ASPART 5 UNITS: 100 INJECTION, SOLUTION INTRAVENOUS; SUBCUTANEOUS at 16:45

## 2022-11-23 RX ADMIN — PANTOPRAZOLE SODIUM 40 MG: 40 TABLET, DELAYED RELEASE ORAL at 05:07

## 2022-11-23 RX ADMIN — HYDROCODONE BITARTRATE AND ACETAMINOPHEN 1 TABLET: 5; 325 TABLET ORAL at 05:12

## 2022-11-23 RX ADMIN — Medication 1 TABLET: at 08:49

## 2022-11-23 RX ADMIN — DICLOFENAC 4 G: 10 GEL TOPICAL at 21:00

## 2022-11-23 RX ADMIN — CEFTAROLINE FOSAMIL 600 MG: 600 POWDER, FOR SOLUTION INTRAVENOUS at 04:26

## 2022-11-23 RX ADMIN — DICLOFENAC 4 G: 10 GEL TOPICAL at 12:46

## 2022-11-23 RX ADMIN — FONDAPARINUX SODIUM 2.5 MG: 2.5 INJECTION, SOLUTION SUBCUTANEOUS at 08:50

## 2022-11-23 RX ADMIN — INSULIN DETEMIR 22 UNITS: 100 INJECTION, SOLUTION SUBCUTANEOUS at 08:50

## 2022-11-23 RX ADMIN — CARBIDOPA AND LEVODOPA 7.5 MG: 50; 200 TABLET, EXTENDED RELEASE ORAL at 08:49

## 2022-11-23 RX ADMIN — INSULIN ASPART 4 UNITS: 100 INJECTION, SOLUTION INTRAVENOUS; SUBCUTANEOUS at 12:44

## 2022-11-23 RX ADMIN — CARBIDOPA AND LEVODOPA 7.5 MG: 50; 200 TABLET, EXTENDED RELEASE ORAL at 12:45

## 2022-11-23 RX ADMIN — ASPIRIN 81 MG: 81 TABLET, COATED ORAL at 08:49

## 2022-11-23 RX ADMIN — DICLOFENAC 4 G: 10 GEL TOPICAL at 18:11

## 2022-11-23 RX ADMIN — INSULIN DETEMIR 22 UNITS: 100 INJECTION, SOLUTION SUBCUTANEOUS at 21:00

## 2022-11-23 RX ADMIN — Medication 150 MG: at 08:50

## 2022-11-23 RX ADMIN — CEFTAROLINE FOSAMIL 600 MG: 600 POWDER, FOR SOLUTION INTRAVENOUS at 20:00

## 2022-11-23 RX ADMIN — POLYETHYLENE GLYCOL (3350) 17 G: 17 POWDER, FOR SOLUTION ORAL at 08:50

## 2022-11-23 RX ADMIN — PREGABALIN 100 MG: 100 CAPSULE ORAL at 21:00

## 2022-11-23 RX ADMIN — INSULIN ASPART 4 UNITS: 100 INJECTION, SOLUTION INTRAVENOUS; SUBCUTANEOUS at 08:49

## 2022-11-23 RX ADMIN — DICLOFENAC 4 G: 10 GEL TOPICAL at 08:51

## 2022-11-23 RX ADMIN — DOCUSATE SODIUM 100 MG: 100 CAPSULE ORAL at 08:49

## 2022-11-23 RX ADMIN — LEVOTHYROXINE SODIUM 25 MCG: 0.07 TABLET ORAL at 05:07

## 2022-11-23 RX ADMIN — PREGABALIN 100 MG: 100 CAPSULE ORAL at 08:50

## 2022-11-23 RX ADMIN — INSULIN ASPART 4 UNITS: 100 INJECTION, SOLUTION INTRAVENOUS; SUBCUTANEOUS at 16:46

## 2022-11-23 RX ADMIN — CYCLOBENZAPRINE 5 MG: 10 TABLET, FILM COATED ORAL at 05:08

## 2022-11-23 RX ADMIN — DAPTOMYCIN 500 MG: 500 INJECTION, POWDER, LYOPHILIZED, FOR SOLUTION INTRAVENOUS at 18:10

## 2022-11-23 RX ADMIN — FLUDROCORTISONE ACETATE 50 MCG: 0.1 TABLET ORAL at 08:49

## 2022-11-23 RX ADMIN — CARBIDOPA AND LEVODOPA 7.5 MG: 50; 200 TABLET, EXTENDED RELEASE ORAL at 16:45

## 2022-11-23 RX ADMIN — OXYCODONE HYDROCHLORIDE AND ACETAMINOPHEN 500 MG: 500 TABLET ORAL at 08:49

## 2022-11-23 NOTE — PLAN OF CARE
Goal Outcome Evaluation:  Plan of Care Reviewed With: patient  reports ortho f/u in [place] with fluid aspiration from right knee. Reports knee sore, swollen and painful. He reports the approximated aspiration of what to appeared to be blood was about 4 ml. He is encouraged on positive aspect, IV abx, continue to participate in therapy. To report any problems to nurse, understanding voiced. Will continue to monitor.

## 2022-11-23 NOTE — SIGNIFICANT NOTE
11/23/22 1515   Plan   Plan SS spoke to pt and sister Ros about pt to have surgery at [facility] on his right knee on 11-30-22; time has not been determined yet.  Sister says she has to transport pt to [facility] on 11-28-22 at 1:00 pm for labs/EKG for pre-op then he will have surgery on 11-30-22; time will be given on 11-28-22.  Sister will transport pt to this appointment on 11-28-22 and will come to rehab around 10:00 am and she will bring pt back to rehab.  Will follow.   Patient/Family in Agreement with Plan yes

## 2022-11-23 NOTE — PROGRESS NOTES
PROGRESS NOTE         Patient Identification:  Name:  Melchor Hardwick III  Age:  57 y.o.  Sex:  male  :  1965  MRN:  [id number]  Visit Number:  [id number]  Primary Care Provider:  Missy Up APRN         LOS: 26 days       ----------------------------------------------------------------------------------------------------------------------  Subjective       Chief Complaints:    No chief complaint on file.        Interval History:      The patient is participating in physical therapy this morning on room air in no apparent distress.  Patient reports that he had an appointment yesterday with Dr. Bentley at which time fluid was drawn off the knee.  Surgeon plans on washout next Wednesday, 2022.  Right knee is very warm, edematous, and erythematous this morning.  Afebrile, no diarrhea.  CRP is worsened at 9.45.  Patient is once again leukopenic with a WBC of 3.27.  2 out of 2 blood cultures on 2022 are so far showing MRSA.  1 out of 2 blood cultures on 2022 is so far showing MRSA.    Review of Systems:    Constitutional: no fever, chills and night sweats.  Generalized fatigue.  Eyes: no eye drainage, itching or redness.  HEENT: no mouth sores, dysphagia or nose bleed.  Respiratory: no for shortness of breath, cough or production of sputum.  Cardiovascular: no chest pain, no palpitations, no orthopnea.  Gastrointestinal: no nausea, vomiting or diarrhea. No abdominal pain, hematemesis or rectal bleeding.  Genitourinary: no dysuria or polyuria.  Hematologic/lymphatic: no lymph node abnormalities, no easy bruising or easy bleeding.  Musculoskeletal: no muscle or joint pain.  Right knee pain, swelling, warmth and redness.  Chronic back spasms.  Skin: No rash and no itching.  Neurological: no loss of consciousness, no seizure, no headache.  Behavioral/Psych: no depression or suicidal ideation.  Endocrine: no hot flashes.  Immunologic:  negative.    ----------------------------------------------------------------------------------------------------------------------      Objective       [facility] Meds:  ascorbic acid, 500 mg, Oral, Daily  aspirin, 81 mg, Oral, Daily  ceftaroline, 600 mg, Intravenous, Q8H  DAPTOmycin, 6 mg/kg (Adjusted), Intravenous, Q24H  Diclofenac Sodium, 4 g, Topical, 4x Daily  docusate sodium, 100 mg, Oral, BID  fludrocortisone, 50 mcg, Oral, Daily  fondaparinux, 2.5 mg, Subcutaneous, Q24H  Insulin Aspart, 0-14 Units, Subcutaneous, TID AC  Insulin Aspart, 4 Units, Subcutaneous, TID AC  insulin detemir, 22 Units, Subcutaneous, Q12H  iron polysaccharides, 150 mg, Oral, Daily  levothyroxine, 25 mcg, Oral, Q AM  lidocaine PF 2%, 10 mL, Injection, Once  midodrine, 7.5 mg, Oral, TID AC  multivitamin with minerals, 1 tablet, Oral, Daily  pantoprazole, 40 mg, Oral, Q AM  polyethylene glycol, 17 g, Oral, Daily  pregabalin, 100 mg, Oral, Q12H      Pharmacy Consult,       ----------------------------------------------------------------------------------------------------------------------    Vital Signs:  Temp:  [97.8 °F (36.6 °C)-98 °F (36.7 °C)] 97.8 °F (36.6 °C)  Heart Rate:  [80-94] 94  Resp:  [18-20] 18  BP: (112-126)/(63-74) 126/74  No data found.  SpO2 Percentage    11/22/22 0913 11/22/22 1900 11/23/22 0700   SpO2: 100% 100% 100%     SpO2:  [100 %] 100 %  on   ;   Device (Oxygen Therapy): room air    Body mass index is 28.34 kg/m².  Wt Readings from Last 3 Encounters:   11/01/22 89.6 kg (197 lb 8.5 oz)   09/23/22 98 kg (216 lb)   06/17/22 115 kg (254 lb)        Intake/Output Summary (Last 24 hours) at 11/23/2022 0940  Last data filed at 11/23/2022 0700  Gross per 24 hour   Intake 610 ml   Output 1425 ml   Net -815 ml     Diet: Texture: Regular Texture (IDDSI 7); Fluid Consistency: Regular Consistency; Diabetic Diets; Consistent  Carbohydrate  ----------------------------------------------------------------------------------------------------------------------      Physical Exam:    Constitutional: Chronically ill-appearing male is sitting up in a wheelchair participating in physical therapy.  Appears very comfortable.  HENT:  Head: Normocephalic and atraumatic.  Mouth:  Moist mucous membranes.    Cardiovascular:  Normal rate, regular rhythm and normal heart sounds with no murmur. No edema.  Pulmonary/Chest:  No respiratory distress, no wheezes, no crackles, with normal breath sounds and good air movement.  Abdominal:  Soft.  Bowel sounds are normal.  No distension and no tenderness.   Musculoskeletal:  No edema, no tenderness, and no deformity.  No swelling or redness of joints.  Neurological:  Alert and oriented to person, place, and time.  No facial droop.  No slurred speech.   Skin:  Skin is warm and dry.  No rash noted.  No pallor.  Right knee surgical incision scar. Right knee with worsened edema, erythema, and warmth.  There is significant decreased range of motion.  Psychiatric:  Normal mood and affect.  Behavior is normal.    ----------------------------------------------------------------------------------------------------------------------  Results from last 7 days   Lab Units 11/20/22  2358   CK TOTAL U/L 23           Results from last 7 days   Lab Units 11/23/22  0725 11/22/22  0117 11/21/22 0723 11/20/22  2358 11/20/22  0452 11/18/22  0300   CRP mg/dL 9.45*  --   --  7.32*  --  8.38*   WBC 10*3/mm3 3.27* 3.87 3.59 3.79   < > 2.84*   HEMOGLOBIN g/dL 10.5* 9.1* 10.3* 9.1*   < > 9.1*   HEMATOCRIT % 33.2* 28.9* 33.2* 29.3*   < > 29.4*   MCV fL 82.4 82.8 84.1 81.8   < > 83.3   MCHC g/dL 31.6 31.5 31.0* 31.1*   < > 31.0*   PLATELETS 10*3/mm3 141 126* 126* 119*   < > 104*   INR   --   --   --   --   --  1.18*    < > = values in this interval not displayed.     Results from last 7 days   Lab Units 11/23/22  0725 11/22/22  0117  11/20/22  2358   SODIUM mmol/L 133* 132* 131*   POTASSIUM mmol/L 3.6 4.2 4.0   CHLORIDE mmol/L 97* 99 99   CO2 mmol/L 25.7 23.9 22.5   BUN mg/dL 12 12 9   CREATININE mg/dL 0.68* 0.81 0.64*   CALCIUM mg/dL 8.8 7.8* 7.9*   GLUCOSE mg/dL 121* 272* 291*   Estimated Creatinine Clearance: 134.9 mL/min (A) (by C-G formula based on SCr of 0.68 mg/dL (L)).  No results found for: AMMONIA    Glucose   Date/Time Value Ref Range Status   11/23/2022 0633 125 70 - 130 mg/dL Final     Comment:     Meter: [id number] : [id number] Raul Wu   11/22/2022 2031 244 (H) 70 - 130 mg/dL Final     Comment:     Meter: [id number] : [id number] YAMILET NATAN   11/22/2022 1129 279 (H) 70 - 130 mg/dL Final     Comment:     Meter: [id number] : [id number] NADJA RANGEL   11/22/2022 0636 259 (H) 70 - 130 mg/dL Final     Comment:     Meter: [id number] : [id number] YAMILET GAMA   11/21/2022 2047 268 (H) 70 - 130 mg/dL Final     Comment:     Meter: [id number] : [id number] YAMILET GAMA   11/21/2022 1618 242 (H) 70 - 130 mg/dL Final     Comment:     Meter: [id number] : [id number] NADJA RANGEL   11/21/2022 1030 266 (H) 70 - 130 mg/dL Final     Comment:     Meter: [id number] : [id number] Audrey Mcginnis   11/21/2022 0626 219 (H) 70 - 130 mg/dL Final     Comment:     Meter: [id number] : [id number] Susi Velez     Lab Results   Component Value Date    HGBA1C 8.80 (H) 09/20/2022     Lab Results   Component Value Date    TSH 2.390 10/10/2022    FREET4 0.87 (L) 10/10/2022       Blood Culture   Date Value Ref Range Status   10/24/2022 No growth at 5 days  Final   10/24/2022 No growth at 5 days  Final     No results found for: URINECX  No results found for: WOUNDCX  No results found for: STOOLCX  No results found for: RESPCX  Pain Management Panel       Pain Management Panel Latest Ref Rng & Units 5/24/2022 5/11/2022    CREATININE UR mg/dL 91.0 -    AMPHETAMINES SCREEN, URINE Negative - Negative    BARBITURATES  SCREEN Negative - Negative    BENZODIAZEPINE SCREEN, URINE Negative - Negative    BUPRENORPHINEUR Negative - Negative    COCAINE SCREEN, URINE Negative - Negative    METHADONE SCREEN, URINE Negative - Negative    METHAMPHETAMINEUR Negative - Negative              ----------------------------------------------------------------------------------------------------------------------  Imaging Results (Last 24 Hours)       ** No results found for the last 24 hours. **            ----------------------------------------------------------------------------------------------------------------------    Pertinent Infectious Disease Results    COVID-19 PCR from 10/28/2022 negative.  Blood cultures from 10/18/2022 2 out of 2 sets positive for MRSA.  Blood cultures from 10/20/2022 2 out of 2 sets positive for MRSA.  Blood cultures from 10/22/2022 1 out of 2 sets positive for MRSA.  Blood cultures from 10/24/2022 finalized as no growth. Blood cultures on 11/12/2022 finalized as no growth. 2D echo from 10/24/2022 reports no evidence of vegetation.  Updated TYRESE on 11/1/2022 showed a moderate sized echodense structure on the posterior tricuspid leaflet but not attached to the leaflet.  The appearance and location are not typical for regurgitation.  This structure could be a normal variant.  No evidence of tricuspid valve stenosis or significant regurgitations.  Other valves also appear normal in structure with no evidence of stenosis or significant regurgitations.  No vegetation seen on these valves. Right lower extremity venous duplex negative for DVT.  Bilateral upper extremity venous duplex negative for DVT.     CT right knee without contrast on 11/17/2022 showed a large joint effusion again.  Suprapatellar region collection appears slightly larger now measuring 10.3 x 3.3 cm in axial dimension and was 8.8 x 2.8 cm.  The collection again shows heterogenous mixed density.  Increasing lytic lucency at the lateral tibial plateau  could represent osteomyelitis.  Blood cultures on 11/12/2022 finalized as no growth.    CPK on 11/20/2022 was normal at 23.  Urinalysis on 11/21/2022 was negative.  Chest x-ray on 11/21/2022 showed redemonstrated elevation of the right hemidiaphragm with mild right basilar atelectasis/infiltrate.    2 out of 2 blood cultures on 11/20/2022 are so far showing MRSA.  1 out of 2 blood cultures on 11/22/2022 is so far showing MRSA.    Assessment/Plan       Assessment     Right knee septic arthritis  MRSA bacteremia  Possible endocarditis    Plan      I have examined the patient this morning and he is participating in physical therapy on room air in no apparent distress.  Reports that he had an appointment with his orthopedic surgeon, Dr. Bentley yesterday at which time fluid was drawn off the knee.  Surgeon plans for washout next Wednesday, 11/30/2022.  Right knee is very warm, edematous, and erythematous this morning.  Significant decreased range of motion.    I believe MRSA bacteremia seen on 11/20/2022 and 11/22/2022 is related to worsening right knee infection and patient is awaiting intraoperative washout with cultures at this time. Recommend to continue on daptomycin and ceftaroline for now.    Code Status:   Code Status and Medical Interventions:   Ordered at: 11/03/22 1015     Level Of Support Discussed With:    Patient     Code Status (Patient has no pulse and is not breathing):    CPR (Attempt to Resuscitate)     Medical Interventions (Patient has pulse or is breathing):    Full Support     Release to patient:    Routine Release       Sara Sales PA-C  11/23/22  09:40 EST

## 2022-11-23 NOTE — THERAPY TREATMENT NOTE
Inpatient Rehabilitation - Physical Therapy Treatment Note       YVONNE Gainse     Patient Name: Melchor Hardwick III  : 1965  MRN: [id number]    Today's Date: 2022                    Admit Date: 10/28/2022      Visit Dx:     ICD-10-CM ICD-9-CM   1. Staphylococcal arthritis of right knee (HCC)  M00.061 711.06     041.10       Patient Active Problem List   Diagnosis   • Hyperlipidemia   • Hypertensive disorder   • Obesity   • Type 2 diabetes mellitus with hyperglycemia, with long-term current use of insulin (HCC)   • Gas gangrene of foot (HCC)   • Cellulitis in diabetic foot (HCC)   • Other acute osteomyelitis of left foot (HCC)   • Osteomyelitis (HCC)   • Critical illness myopathy   • Staphylococcal arthritis of right knee (HCC)   • Other cirrhosis of liver (HCC)   • MRSA bacteremia   • Endocarditis of tricuspid valve   • Wound of right buttock   • History of osteomyelitis   • Debility       Past Medical History:   Diagnosis Date   • Diabetes mellitus (HCC)    • Hyperlipidemia    • Hypertension    • Pyogenic arthritis of right knee joint, due to unspecified organism (HCC) 2022       Past Surgical History:   Procedure Laterality Date   • ABSCESS DRAINAGE      PERINEUM   • AMPUTATION FOOT Left 2022    Procedure: AMPUTATION FOOT;  Surgeon: Addison Colby MD;  Location: [facility] OR;  Service: Podiatry;  Laterality: Left;   • INCISION AND DRAINAGE LEG Left 05/10/2022    Procedure: INCISION AND DRAINAGE LOWER EXTREMITY;  Surgeon: Addison Colby MD;  Location:  [facility] OR;  Service: Podiatry;  Laterality: Left;   • KNEE INCISION AND DRAINAGE Right 2022    Procedure: KNEE INCISION AND DRAINAGE RIGHT;  Surgeon: Brain Bentley MD;  Location: [facility] OR;  Service: Orthopedics;  Laterality: Right;   • WOUND DEBRIDEMENT Left 2022    Procedure: DEBRIDEMENT FOOT;  Surgeon: Addison Colby MD;  Location: [facility] OR;  Service: Podiatry;  Laterality: Left;       PT ASSESSMENT (last 12 hours)     IRF PT  Evaluation and Treatment     Row Name 11/23/22 1413          PT Time and Intention    Document Type daily treatment;other (see comments)  -RF     Mode of Treatment individual therapy;physical therapy  -RF     Patient/Family/Caregiver Comments/Observations Pt c/o low back and R Knee pain.  -RF     Row Name 11/23/22 1413          General Information    Patient Profile Reviewed yes  -RF     Existing Precautions/Restrictions fall;brace worn when out of bed  air cast LLE  -RF     Row Name 11/23/22 1413          Cognition/Psychosocial    Affect/Mental Status (Cognition) WFL  -RF     Follows Commands (Cognition) WFL  -RF     Personal Safety Interventions nonskid shoes/slippers when out of bed;gait belt;fall prevention program maintained  -RF     Cognitive Function WFL  -RF     Row Name 11/23/22 1413          Bed Mobility    Bed Mobility bed mobility (all) activities  -RF     Supine-Sit [place] (Bed Mobility) minimum assist (75% patient effort);contact guard  HOB elevated  -RF     Sit-Supine [place] (Bed Mobility) minimum assist (75% patient effort)  Requires assistance with LEs  -RF     Assistive Device (Bed Mobility) bed rails  -RF     Row Name 11/23/22 1413          Transfer Assessment/Treatment    Transfers bed-chair transfer;chair-bed transfer;sit-stand transfer;stand-sit transfer;car transfer  -RF     Row Name 11/23/22 1413          Bed-Chair Transfer    Bed-Chair [place] (Transfers) contact guard;standby assist  -RF     Assistive Device (Bed-Chair Transfers) walker, front-wheeled  -RF     Row Name 11/23/22 1413          Chair-Bed Transfer    Chair-Bed [place] (Transfers) contact guard;standby assist  -RF     Assistive Device (Chair-Bed Transfers) wheelchair;walker, front-wheeled  -RF     Row Name 11/23/22 1413          Sit-Stand Transfer    Sit-Stand [place] (Transfers) contact guard;standby assist  -RF     Assistive Device (Sit-Stand Transfers) walker, front-wheeled  -RF     Row Name  11/23/22 1413          Stand-Sit Transfer    Stand-Sit [place] (Transfers) contact guard;standby assist  -RF     Assistive Device (Stand-Sit Transfers) wheelchair;walker, front-wheeled  -RF     Row Name 11/23/22 1413          Gait/Stairs (Locomotion)    Gait/Stairs Locomotion gait/ambulation independence;gait/ambulation assistive device;distance ambulated  -RF     Success Level (Gait) standby assist;supervision  -RF     Assistive Device (Gait) walker, front-wheeled  -RF     Distance in Feet (Gait) 40  -RF     Pattern (Gait) step-to;step-through  -RF     Deviations/Abnormal Patterns (Gait) right sided deviations;antalgic;stride length decreased;weight shifting decreased  -RF     Bilateral Gait Deviations forward flexed posture  -RF     Comment, (Gait/Stairs) Distance hindered by pain at this time.  -RF     Row Name 11/23/22 1413          Safety Issues, Functional Mobility    Impairments Affecting Function (Mobility) balance;endurance/activity tolerance;pain;range of motion (ROM);postural/trunk control  -RF     Row Name 11/23/22 1413          Balance    Dynamic Sitting Balance modified independence;other (see comments)  bicep curl, overhead press with #2 ball; shoulder blade squeeze, tricep press down with GTB  -RF     Row Name 11/23/22 1413          Hip (Therapeutic Exercise)    Hip Strengthening (Therapeutic Exercise) bilateral;flexion;extension;aBduction;aDduction;heel slides;marching while seated;marching while standing;sitting;standing;2 lb free weight;resistance band;blue;2 sets;10 repetitions  -RF     Row Name 11/23/22 1413          Knee (Therapeutic Exercise)    Knee Strengthening (Therapeutic Exercise) bilateral;flexion;extension;heel slides;marching while seated;marching while standing;LAQ (long arc quad);hamstring curls;sitting;standing;2 lb free weight;resistance band;blue;2 sets;10 repetitions  -RF     Row Name 11/23/22 1413          Ankle (Therapeutic Exercise)    Ankle Strengthening  (Therapeutic Exercise) bilateral;dorsiflexion;plantarflexion;sitting;standing;2 lb free weight;2 sets;10 repetitions  -RF     Row Name 11/23/22 1413          Positioning and Restraints    Pre-Treatment Position in bed  -RF     In Wheelchair sitting;with OT  -RF     Row Name 11/23/22 1413          Daily Progress Summary (PT)    Functional Goal Overall Progress (PT) progressing toward functional goals slower than expected  -RF     Daily Progress Summary (PT) Low back and knee pain significantly hinder functional mobility and ambulation quality at this time. Continued skilled care required as tolerated for further LE strengthening to ensure maximum safe function upon D/C.  -RF     Impairments Still Limiting Function (PT) functional activity tolerance impairment;pain;range of motion deficit;strength deficit  -RF     Recommendations (PT) Continue per current POC  -RF     Row Name 11/23/22 1413          IRF PT Goals    Bed Mobility Goal Selection (PT-IRF) bed mobility, PT goal 1  -RF     Transfer Goal Selection (PT-IRF) transfers, PT goal 1  -RF     Gait (Walking Locomotion) Goal Selection (PT-IRF) gait, PT goal 1  -RF     Row Name 11/23/22 1413          Bed Mobility Goal 1 (PT-IRF)    Activity/Assistive Device (Bed Mobility Goal 1, PT-IRF) bed mobility activities, all  -RF     [place] Level (Bed Mobility Goal 1, PT-IRF) independent  -RF     Time Frame (Bed Mobility Goal 1, PT-IRF) long-term goal (LTG)  -RF     Progress/Outcomes (Bed Mobility Goal 1, PT-IRF) goal met  -RF     Row Name 11/23/22 1413          Transfer Goal 1 (PT-IRF)    Activity/Assistive Device (Transfer Goal 1, PT-IRF) sit-to-stand/stand-to-sit;bed-to-chair/chair-to-bed;walker, rolling  -RF     [place] Level (Transfer Goal 1, PT-IRF) modified independence  -RF     Time Frame (Transfer Goal 1, PT-IRF) long-term goal (LTG)  -RF     Progress/Outcomes (Transfer Goal 1, PT-IRF) new goal  -RF     Row Name 11/23/22 1413          Gait/Walking Locomotion  Goal 1 (PT-IRF)    Activity/Assistive Device (Gait/Walking Locomotion Goal 1, PT-IRF) gait (walking locomotion);assistive device use  -RF     Gait/Walking Locomotion Distance Goal 1 (PT-IRF) 100'  -RF     [place] Level (Gait/Walking Locomotion Goal 1, PT-IRF) modified independence  -RF     Time Frame (Gait/Walking Locomotion Goal 1, PT-IRF) long-term goal (LTG)  -RF     Progress/Outcomes (Gait/Walking Locomotion Goal 1, PT-IRF) goal revised this date  -RF           User Key  (r) = Recorded By, (t) = Taken By, (c) = Cosigned By    Initials Name Provider Type    RF Meron Lei, PTA Physical Therapist Assistant              Wound 09/21/22 1532 Right anterior knee Incision (Active)   Dressing Appearance open to air 11/23/22 0800   Closure Approximated 11/23/22 0800   Base dry;clean 11/23/22 0800   Periwound intact;dry 11/22/22 1919   Periwound Temperature warm 11/22/22 1919   Periwound Skin Turgor other (see comments) 11/22/22 1919   Drainage Amount none 11/23/22 0800   Dressing Care open to air 11/23/22 0800       Wound 10/28/22 1827 Right coccyx Pressure Injury (Active)   Dressing Appearance open to air 11/23/22 0800   Closure Open to air 11/23/22 0800   Base blanchable;pink 11/23/22 0800   Drainage Amount none 11/22/22 1919   Care, Wound barrier applied 11/23/22 0800   Dressing Care open to air 11/23/22 0800     Physical Therapy Education     Title: PT OT SLP Therapies (Done)     Topic: Physical Therapy (Done)     Point: Mobility training (Done)     Learning Progress Summary           Patient Acceptance, E,TB, VU by RF at 11/23/2022 1417    Acceptance, E,TB, VU by DL at 11/23/2022 0029    Acceptance, E,TB, VU by RF at 11/22/2022 1609    Acceptance, E,TB, VU by DL at 11/22/2022 0442    Acceptance, E,TB, VU by DL at 11/22/2022 0429    Acceptance, E,TB, VU by RF at 11/21/2022 1522    Acceptance, E,TB, VU,DU by HR at 11/18/2022 1546    Acceptance, E,TB, VU by RF at 11/18/2022 1525    Acceptance, E,TB, VU by RF  at 11/17/2022 1528    Acceptance, E,TB, VU by DL at 11/17/2022 0233    Acceptance, E,TB, VU by RF at 11/16/2022 1606    Acceptance, E,TB, VU by DL at 11/15/2022 2033    Acceptance, E,TB, VU by RF at 11/15/2022 1541    Acceptance, E,TB, VU by DL at 11/15/2022 0305    Acceptance, E,TB, VU by RF at 11/14/2022 1528    Acceptance, E,TB, VU by RF at 11/10/2022 1551    Acceptance, E,TB, VU by RF at 11/9/2022 1622    Acceptance, E,TB, VU by DG at 11/9/2022 0050    Acceptance, E,TB, VU by RF at 11/8/2022 1539    Acceptance, E,TB, VU by DG at 11/8/2022 0114    Acceptance, E,TB, VU by RF at 11/7/2022 1607    Acceptance, E,TB, VU by DG at 11/6/2022 2023    Acceptance, E,TB, VU by RF at 11/4/2022 1551    Acceptance, E,TB, VU,DU by HR at 11/3/2022 1541    Acceptance, E,D, VU,NR by RG at 11/3/2022 1442    Acceptance, E,TB, VU by DG at 11/1/2022 2016    Acceptance, E,D, VU,NR by RG at 11/1/2022 1541    Acceptance, E,TB, VU by DG at 10/31/2022 2315    Acceptance, E,D, VU,NR by RG at 10/31/2022 1446                   Point: Home exercise program (Done)     Learning Progress Summary           Patient Acceptance, E,TB, VU by RF at 11/23/2022 1417    Acceptance, E,TB, VU by DL at 11/23/2022 0029    Acceptance, E,TB, VU by RF at 11/22/2022 1609    Acceptance, E,TB, VU by DL at 11/22/2022 0442    Acceptance, E,TB, VU by DL at 11/22/2022 0429    Acceptance, E,TB, VU by RF at 11/21/2022 1522    Acceptance, E,TB, VU,DU by HR at 11/18/2022 1546    Acceptance, E,TB, VU by RF at 11/18/2022 1525    Acceptance, E,TB, VU by RF at 11/17/2022 1528    Acceptance, E,TB, VU by DL at 11/17/2022 0233    Acceptance, E,TB, VU by RF at 11/16/2022 1606    Acceptance, E,TB, VU by DL at 11/15/2022 2033    Acceptance, E,TB, VU by RF at 11/15/2022 1541    Acceptance, E,TB, VU by DL at 11/15/2022 0305    Acceptance, E,TB, VU by RF at 11/14/2022 1528    Acceptance, E,TB, VU by RF at 11/10/2022 1551    Acceptance, E,TB, VU by RF at 11/9/2022 1622    Acceptance,  E,TB, VU by DG at 11/9/2022 0050    Acceptance, E,TB, VU by RF at 11/8/2022 1539    Acceptance, E,TB, VU by DG at 11/8/2022 0114    Acceptance, E,TB, VU by RF at 11/7/2022 1607    Acceptance, E,TB, VU by DG at 11/6/2022 2023    Acceptance, E,TB, VU by RF at 11/4/2022 1551    Acceptance, E,TB, VU,DU by HR at 11/3/2022 1541    Acceptance, E,D, VU,NR by RG at 11/3/2022 1442    Acceptance, E,TB, VU by DG at 11/1/2022 2016    Acceptance, E,D, VU,NR by RG at 11/1/2022 1541    Acceptance, E,TB, VU by DG at 10/31/2022 2315    Acceptance, E,D, VU,NR by RG at 10/31/2022 1446                   Point: Body mechanics (Done)     Learning Progress Summary           Patient Acceptance, E,TB, VU by RF at 11/23/2022 1417    Acceptance, E,TB, VU by DL at 11/23/2022 0029    Acceptance, E,TB, VU by RF at 11/22/2022 1609    Acceptance, E,TB, VU by DL at 11/22/2022 0442    Acceptance, E,TB, VU by DL at 11/22/2022 0429    Acceptance, E,TB, VU by RF at 11/21/2022 1522    Acceptance, E,TB, VU,DU by HR at 11/18/2022 1546    Acceptance, E,TB, VU by RF at 11/18/2022 1525    Acceptance, E,TB, VU by RF at 11/17/2022 1528    Acceptance, E,TB, VU by DL at 11/17/2022 0233    Acceptance, E,TB, VU by RF at 11/16/2022 1606    Acceptance, E,TB, VU by DL at 11/15/2022 2033    Acceptance, E,TB, VU by RF at 11/15/2022 1541    Acceptance, E,TB, VU by DL at 11/15/2022 0305    Acceptance, E,TB, VU by RF at 11/14/2022 1528    Acceptance, E,TB, VU by RF at 11/10/2022 1551    Acceptance, E,TB, VU by RF at 11/9/2022 1622    Acceptance, E,TB, VU by DG at 11/9/2022 0050    Acceptance, E,TB, VU by RF at 11/8/2022 1539    Acceptance, E,TB, VU by DG at 11/8/2022 0114    Acceptance, E,TB, VU by RF at 11/7/2022 1607    Acceptance, E,TB, VU by DG at 11/6/2022 2023    Acceptance, E,TB, VU by RF at 11/4/2022 1551    Acceptance, E,TB, VU,DU by HR at 11/3/2022 1541    Acceptance, E,D, VU,NR by RG at 11/3/2022 1442    Acceptance, E,TB, VU by DG at 11/1/2022 2016     Acceptance, E,D, VU,NR by RG at 11/1/2022 1541    Acceptance, E,TB, VU by DG at 10/31/2022 2315    Acceptance, E,D, VU,NR by RG at 10/31/2022 1446                   Point: Precautions (Done)     Learning Progress Summary           Patient Acceptance, E,TB, VU by RF at 11/23/2022 1417    Acceptance, E,TB, VU by DL at 11/23/2022 0029    Acceptance, E,TB, VU by RF at 11/22/2022 1609    Acceptance, E,TB, VU by DL at 11/22/2022 0442    Acceptance, E,TB, VU by DL at 11/22/2022 0429    Acceptance, E,TB, VU by RF at 11/21/2022 1522    Acceptance, E,TB, VU,DU by HR at 11/18/2022 1546    Acceptance, E,TB, VU by RF at 11/18/2022 1525    Acceptance, E,TB, VU by RF at 11/17/2022 1528    Acceptance, E,TB, VU by DL at 11/17/2022 0233    Acceptance, E,TB, VU by RF at 11/16/2022 1606    Acceptance, E,TB, VU by DL at 11/15/2022 2033    Acceptance, E,TB, VU by RF at 11/15/2022 1541    Acceptance, E,TB, VU by DL at 11/15/2022 0305    Acceptance, E,TB, VU by RF at 11/14/2022 1528    Acceptance, E,TB, VU by RF at 11/10/2022 1551    Acceptance, E,TB, VU by RF at 11/9/2022 1622    Acceptance, E,TB, VU by DG at 11/9/2022 0050    Acceptance, E,TB, VU by RF at 11/8/2022 1539    Acceptance, E,TB, VU by DG at 11/8/2022 0114    Acceptance, E,TB, VU by RF at 11/7/2022 1607    Acceptance, E,TB, VU by DG at 11/6/2022 2023    Acceptance, E,TB, VU by RF at 11/4/2022 1551    Acceptance, E,TB, VU,DU by HR at 11/3/2022 1541    Acceptance, E,D, VU,NR by  at 11/3/2022 1442    Acceptance, E,TB, VU by  at 11/1/2022 2016    Acceptance, E,D, VU,NR by  at 11/1/2022 1541    Acceptance, E,TB, VU by  at 10/31/2022 2315    Acceptance, E,D, VU,NR by  at 10/31/2022 1446                               User Key     Initials Effective Dates Name Provider Type Discipline     06/16/21 -  Phyllis Yañez RN Registered Nurse Nurse     06/16/21 -  Meron Lei PTA Physical Therapist Assistant PT     06/16/21 -  Michi Mckeon PTA Physical Therapist  Assistant PT    HR 01/14/22 -  Hanna Murphy PTA Physical Therapist Assistant PT    DL 03/16/22 -  Hellen Mcdonnell, RN Registered Nurse Nurse                PT Recommendation and Plan    Frequency of Treatment (PT): 5 times per week, 90 minutes per session     Daily Progress Summary (PT)  Functional Goal Overall Progress (PT): progressing toward functional goals slower than expected  Daily Progress Summary (PT): Low back and knee pain significantly hinder functional mobility and ambulation quality at this time. Continued skilled care required as tolerated for further LE strengthening to ensure maximum safe function upon D/C.  Impairments Still Limiting Function (PT): functional activity tolerance impairment, pain, range of motion deficit, strength deficit  Recommendations (PT): Continue per current POC               Time Calculation:      PT Charges     Row Name 11/23/22 1417             Time Calculation    Start Time 0830  -RF      Stop Time 1000  -RF      Time Calculation (min) 90 min  -RF      PT Received On 11/23/22  -RF      PT - Next Appointment 11/25/22  -RF      PT Goal Re-Cert Due Date 11/25/22  -RF         Time Calculation- PT    Total Timed Code Minutes- PT 90 minute(s)  -RF            User Key  (r) = Recorded By, (t) = Taken By, (c) = Cosigned By    Initials Name Provider Type    RF Meron Lei PTA Physical Therapist Assistant                Therapy Charges for Today     Code Description Service Date Service Provider Modifiers Qty    80153260468 HC PT THER PROC EA 15 MIN 11/22/2022 Meron Lei PTA GP, CQ 2    28014308885 HC PT NEUROMUSC RE EDUCATION EA 15 MIN 11/22/2022 Meron Lei PTA GP, CQ 2    43426294917 HC PT THERAPEUTIC ACT EA 15 MIN 11/22/2022 Meron Lei PTA GP, CQ 2    48549192106 HC GAIT TRAINING EA 15 MIN 11/23/2022 Meron Lei PTA GP, CQ 2    04815025552 HC PT THER PROC EA 15 MIN 11/23/2022 Meron Lei PTA GP, CQ 2    90912507243 HC PT THERAPEUTIC  ACT EA 15 MIN 11/23/2022 Meron Lei, LIZETT GP, CQ 2                   Meron Lei PTA  11/23/2022

## 2022-11-23 NOTE — PROGRESS NOTES
Patient was seen while in occupational therapy.  He was participating, I did discuss per my note yesterday with his surgeon.  Patient is what seems to be persistently bacteremic, he was started on Teflaro by ID and is on daptomycin as well.  This is most likely related to his  osteomyelitis.  He has had no fever.  Right knee still appears edematous and has some warmth.

## 2022-11-23 NOTE — PLAN OF CARE
Goal Outcome Evaluation:  Plan of Care Reviewed With: patient        Progress: improving  Outcome Evaluation: Progressing with therapies. Continue with plan of care.

## 2022-11-23 NOTE — PROGRESS NOTES
Occupational Therapy: Individual: 90 minutes.    Physical Therapy:    Speech Language Pathology:    Signed by: Renny Kramer, Therapy Coordinator

## 2022-11-23 NOTE — THERAPY TREATMENT NOTE
Inpatient Rehabilitation - Occupational Therapy Treatment Note    YVONNE Gaines     Patient Name: Melchor Hardwick III  : 1965  MRN: [id number]    Today's Date: 2022                 Admit Date: 10/28/2022         ICD-10-CM ICD-9-CM   1. Staphylococcal arthritis of right knee (HCC)  M00.061 711.06     041.10       Patient Active Problem List   Diagnosis   • Hyperlipidemia   • Hypertensive disorder   • Obesity   • Type 2 diabetes mellitus with hyperglycemia, with long-term current use of insulin (HCC)   • Gas gangrene of foot (HCC)   • Cellulitis in diabetic foot (HCC)   • Other acute osteomyelitis of left foot (HCC)   • Osteomyelitis (HCC)   • Critical illness myopathy   • Staphylococcal arthritis of right knee (HCC)   • Other cirrhosis of liver (HCC)   • MRSA bacteremia   • Endocarditis of tricuspid valve   • Wound of right buttock   • History of osteomyelitis   • Debility       Past Medical History:   Diagnosis Date   • Diabetes mellitus (HCC)    • Hyperlipidemia    • Hypertension    • Pyogenic arthritis of right knee joint, due to unspecified organism (HCC) 2022       Past Surgical History:   Procedure Laterality Date   • ABSCESS DRAINAGE      PERINEUM   • AMPUTATION FOOT Left 2022    Procedure: AMPUTATION FOOT;  Surgeon: Addison Colby MD;  Location: [facility] OR;  Service: Podiatry;  Laterality: Left;   • INCISION AND DRAINAGE LEG Left 05/10/2022    Procedure: INCISION AND DRAINAGE LOWER EXTREMITY;  Surgeon: Addison Colby MD;  Location: [facility] OR;  Service: Podiatry;  Laterality: Left;   • KNEE INCISION AND DRAINAGE Right 2022    Procedure: KNEE INCISION AND DRAINAGE RIGHT;  Surgeon: Brain Bentley MD;  Location: [facility] OR;  Service: Orthopedics;  Laterality: Right;   • WOUND DEBRIDEMENT Left 2022    Procedure: DEBRIDEMENT FOOT;  Surgeon: Addison Colby MD;  Location: [facility] OR;  Service: Podiatry;  Laterality: Left;             IRF OT ASSESSMENT FLOWSHEET (last 12 hours)      IRF OT Evaluation and Treatment     Row Name 11/23/22 1313          OT Time and Intention    Document Type daily treatment  -JW     Mode of Treatment individual therapy;occupational therapy  -JW     Total Minutes, Occupational Therapy 90  -JW     Patient Effort good  -     Row Name 11/23/22 1315          General Information    General Observations of Patient Pt seen X 2 for a total of 90 Min.  Tx included bed mob tr. with focus on safety, use of bed rails, arm rests and RW.  He completed groom/hyg tr in w/c and at sink, UB/LB drss and needed reacher tr to kayleigh RLE pant leg. He also completed UE PRE and multiple fine and gross motor coord. challenges to increase fine motor skills, str and activity tolerance for increased safety and indep. with ADLs and mob.  -           User Key  (r) = Recorded By, (t) = Taken By, (c) = Cosigned By    Initials Name Effective Dates    Renny Key, OTR 06/16/21 -                  Occupational Therapy Education     Title: PT OT SLP Therapies (Done)     Topic: Occupational Therapy (Done)     Point: ADL training (Done)     Description:   Instruct learner(s) on proper safety adaptation and remediation techniques during self care or transfers.   Instruct in proper use of assistive devices.              Learning Progress Summary           Patient Eager, E,TB,D, DU,NR by PHYLLIS at 11/23/2022 1322    Acceptance, E,TB, VU by DL at 11/23/2022 0029    Acceptance, E,TB, VU by DL at 11/22/2022 0442    Acceptance, E,TB, VU by DL at 11/22/2022 0429    Acceptance, E, VU,NR by HB at 11/18/2022 1211    Acceptance, E,TB,D, VU,DU,NR by LA at 11/17/2022 1423    Acceptance, E,TB, VU by DL at 11/17/2022 0233    Acceptance, E, VU,NR by HB at 11/16/2022 1425    Acceptance, E,TB, VU by DL at 11/15/2022 2033    Acceptance, E, VU,NR by HB at 11/15/2022 1242    Acceptance, E,TB, VU by DL at 11/15/2022 0305    Acceptance, E,TB,D, VU,DU,NR by LA at 11/11/2022 1248    Acceptance, E, VU,NR by BF at 11/10/2022 2095     Acceptance, E, VU,NR by HB at 11/9/2022 1230    Acceptance, E,TB, VU by DG at 11/9/2022 0050    Acceptance, E, VU,NR by HB at 11/8/2022 1426    Acceptance, E,TB, VU by DG at 11/8/2022 0114    Acceptance, E, VU,NR by HB at 11/7/2022 1155    Acceptance, E,TB, VU by DG at 11/6/2022 2023    Acceptance, E, VU,NR by HB at 11/4/2022 1153    Acceptance, E, VU,NR by HB at 11/3/2022 1428    Acceptance, E,TB, VU by DG at 11/1/2022 2016    Acceptance, E,TB, VU by DG at 10/31/2022 2315    Acceptance, E, VU,NR by BF at 10/31/2022 1429    Acceptance, E,TB, VU by DL at 10/31/2022 0101    Acceptance, E,TB, VU by DL at 10/29/2022 2321    Acceptance, E, VU,NR by HB at 10/29/2022 1137                   Point: Home exercise program (Done)     Description:   Instruct learner(s) on appropriate technique for monitoring, assisting and/or progressing therapeutic exercises/activities.              Learning Progress Summary           Patient Eager, E,TB,D, DU,NR by JW at 11/23/2022 1322    Acceptance, E,TB, VU by DL at 11/23/2022 0029    Acceptance, E,TB, VU by DL at 11/22/2022 0442    Acceptance, E,TB, VU by DL at 11/22/2022 0429    Acceptance, E, VU,NR by HB at 11/18/2022 1211    Acceptance, E,TB,D, VU,DU,NR by LA at 11/17/2022 1423    Acceptance, E,TB, VU by DL at 11/17/2022 0233    Acceptance, E, VU,NR by HB at 11/16/2022 1425    Acceptance, E,TB, VU by DL at 11/15/2022 2033    Acceptance, E, VU,NR by HB at 11/15/2022 1242    Acceptance, E,TB, VU by DL at 11/15/2022 0305    Acceptance, E,TB,D, VU,DU,NR by LA at 11/11/2022 1248    Acceptance, E, VU,NR by BF at 11/10/2022 1431    Acceptance, E, VU,NR by HB at 11/9/2022 1230    Acceptance, E,TB, VU by DG at 11/9/2022 0050    Acceptance, E, VU,NR by HB at 11/8/2022 1426    Acceptance, E,TB, VU by DG at 11/8/2022 0114    Acceptance, E, VU,NR by HB at 11/7/2022 1155    Acceptance, E,TB, VU by DG at 11/6/2022 2023    Acceptance, E, VU,NR by HB at 11/4/2022 1153    Acceptance, E, VU,NR by HB at  11/3/2022 1428    Acceptance, E,TB, VU by DG at 11/1/2022 2016    Acceptance, E,TB, VU by DG at 10/31/2022 2315    Acceptance, E,TB, VU by DL at 10/31/2022 0101    Acceptance, E,TB, VU by DL at 10/29/2022 2321    Acceptance, E, VU,NR by HB at 10/29/2022 1137                   Point: Precautions (Done)     Description:   Instruct learner(s) on prescribed precautions during self-care and functional transfers.              Learning Progress Summary           Patient Eager, E,TB,D, DU,NR by JW at 11/23/2022 1322    Acceptance, E,TB, VU by DL at 11/23/2022 0029    Acceptance, E,TB, VU by DL at 11/22/2022 0442    Acceptance, E,TB, VU by DL at 11/22/2022 0429    Acceptance, E, VU,NR by HB at 11/18/2022 1211    Acceptance, E,TB,D, VU,DU,NR by LA at 11/17/2022 1423    Acceptance, E,TB, VU by DL at 11/17/2022 0233    Acceptance, E, VU,NR by HB at 11/16/2022 1425    Acceptance, E,TB, VU by DL at 11/15/2022 2033    Acceptance, E, VU,NR by HB at 11/15/2022 1242    Acceptance, E,TB, VU by DL at 11/15/2022 0305    Acceptance, E,TB,D, VU,DU,NR by LA at 11/11/2022 1248    Acceptance, E, VU,NR by HB at 11/9/2022 1230    Acceptance, E,TB, VU by DG at 11/9/2022 0050    Acceptance, E, VU,NR by HB at 11/8/2022 1426    Acceptance, E,TB, VU by DG at 11/8/2022 0114    Acceptance, E, VU,NR by HB at 11/7/2022 1155    Acceptance, E,TB, VU by DG at 11/6/2022 2023    Acceptance, E, VU,NR by HB at 11/4/2022 1153    Acceptance, E, VU,NR by HB at 11/3/2022 1428    Acceptance, E,TB, VU by DG at 11/1/2022 2016    Acceptance, E,TB, VU by DG at 10/31/2022 2315    Acceptance, E, VU,NR by BF at 10/31/2022 1429    Acceptance, E,TB, VU by DL at 10/31/2022 0101    Acceptance, E,TB, VU by DL at 10/29/2022 2321    Acceptance, E, VU,NR by HB at 10/29/2022 1137                   Point: Body mechanics (Done)     Description:   Instruct learner(s) on proper positioning and spine alignment during self-care, functional mobility activities and/or exercises.               Learning Progress Summary           Patient Eager, E,TB,D, DU,NR by JW at 11/23/2022 1322    Acceptance, E,TB, VU by DL at 11/23/2022 0029    Acceptance, E,TB, VU by DL at 11/22/2022 0442    Acceptance, E,TB, VU by DL at 11/22/2022 0429    Acceptance, E, VU,NR by HB at 11/18/2022 1211    Acceptance, E,TB,D, VU,DU,NR by LA at 11/17/2022 1423    Acceptance, E,TB, VU by DL at 11/17/2022 0233    Acceptance, E, VU,NR by HB at 11/16/2022 1425    Acceptance, E,TB, VU by DL at 11/15/2022 2033    Acceptance, E, VU,NR by HB at 11/15/2022 1242    Acceptance, E,TB, VU by DL at 11/15/2022 0305    Acceptance, E,TB,D, VU,DU,NR by LA at 11/11/2022 1248    Acceptance, E, VU,NR by HB at 11/9/2022 1230    Acceptance, E,TB, VU by DG at 11/9/2022 0050    Acceptance, E, VU,NR by HB at 11/8/2022 1426    Acceptance, E,TB, VU by DG at 11/8/2022 0114    Acceptance, E, VU,NR by HB at 11/7/2022 1155    Acceptance, E,TB, VU by DG at 11/6/2022 2023    Acceptance, E, VU,NR by HB at 11/4/2022 1153    Acceptance, E, VU,NR by HB at 11/3/2022 1428    Acceptance, E,TB, VU by DG at 11/1/2022 2016    Acceptance, E,TB, VU by DG at 10/31/2022 2315    Acceptance, E,TB, VU by DL at 10/31/2022 0101    Acceptance, E,TB, VU by DL at 10/29/2022 2321    Acceptance, E, VU,NR by HB at 10/29/2022 1137                               User Key     Initials Effective Dates Name Provider Type Discipline    BRYSON 06/16/21 -  Phyllis Yañez RN Registered Nurse Nurse     06/16/21 -  Mandi Smith OT Occupational Therapist OT    JW 06/16/21 -  Renny Kramer OTR Occupational Therapist OT    HB 05/25/21 -  Dorothy Rosas OT Occupational Therapist OT    LA 02/14/22 -  Jaqueline Bautista OT Occupational Therapist OT    DL 03/16/22 -  Hellen Mcdonnell, RN Registered Nurse Nurse                    OT Recommendation and Plan                         Time Calculation:      Time Calculation- OT     Row Name 11/23/22 1324 11/23/22 1322          Time Calculation- OT    OT  Start Time 1250  - 1000  -     OT Stop Time 1320  -JW 1100  -     OT Time Calculation (min) 30 min  -JW 60 min  -     Total Timed Code Minutes- OT 30 minute(s)  -JW 60 minute(s)  -JW           User Key  (r) = Recorded By, (t) = Taken By, (c) = Cosigned By    Initials Name Provider Type    Renny Key OTR Occupational Therapist              Therapy Charges for Today     Code Description Service Date Service Provider Modifiers Qty    98050692959 HC OT SELF CARE/MGMT/TRAIN EA 15 MIN 11/23/2022 Renny Kramer OTR GO 3    28931992022 HC OT NEUROMUSC RE EDUCATION EA 15 MIN 11/23/2022 Renny Kramer OTR GO 2    49941873378 HC OT THERAPEUTIC ACT EA 15 MIN 11/23/2022 Renny Kramer OTR GO 1    03289953485 HC OT THER SUPP EA 15 MIN 11/23/2022 Renny Kramer OTR GO 1                   LUL Henson  11/23/2022

## 2022-11-24 LAB
ALBUMIN SERPL-MCNC: 2.34 G/DL (ref 3.5–5.2)
ALBUMIN/GLOB SERPL: 0.7 G/DL
ALP SERPL-CCNC: 249 U/L (ref 39–117)
ALT SERPL W P-5'-P-CCNC: 22 U/L (ref 1–41)
ANION GAP SERPL CALCULATED.3IONS-SCNC: 8.3 MMOL/L (ref 5–15)
AST SERPL-CCNC: 32 U/L (ref 1–40)
BASOPHILS # BLD AUTO: 0.01 10*3/MM3 (ref 0–0.2)
BASOPHILS NFR BLD AUTO: 0.3 % (ref 0–1.5)
BILIRUB SERPL-MCNC: 0.4 MG/DL (ref 0–1.2)
BUN SERPL-MCNC: 10 MG/DL (ref 6–20)
BUN/CREAT SERPL: 14.5 (ref 7–25)
CALCIUM SPEC-SCNC: 8.4 MG/DL (ref 8.6–10.5)
CHLORIDE SERPL-SCNC: 101 MMOL/L (ref 98–107)
CO2 SERPL-SCNC: 25.7 MMOL/L (ref 22–29)
CREAT SERPL-MCNC: 0.69 MG/DL (ref 0.76–1.27)
CRP SERPL-MCNC: 6.62 MG/DL (ref 0–0.5)
DEPRECATED RDW RBC AUTO: 50.4 FL (ref 37–54)
EGFRCR SERPLBLD CKD-EPI 2021: 107.9 ML/MIN/1.73
EOSINOPHIL # BLD AUTO: 0.12 10*3/MM3 (ref 0–0.4)
EOSINOPHIL NFR BLD AUTO: 3.3 % (ref 0.3–6.2)
ERYTHROCYTE [DISTWIDTH] IN BLOOD BY AUTOMATED COUNT: 16.6 % (ref 12.3–15.4)
GLOBULIN UR ELPH-MCNC: 3.6 GM/DL
GLUCOSE BLDC GLUCOMTR-MCNC: 108 MG/DL (ref 70–130)
GLUCOSE BLDC GLUCOMTR-MCNC: 220 MG/DL (ref 70–130)
GLUCOSE BLDC GLUCOMTR-MCNC: 368 MG/DL (ref 70–130)
GLUCOSE BLDC GLUCOMTR-MCNC: 62 MG/DL (ref 70–130)
GLUCOSE SERPL-MCNC: 97 MG/DL (ref 65–99)
HCT VFR BLD AUTO: 29.1 % (ref 37.5–51)
HGB BLD-MCNC: 8.9 G/DL (ref 13–17.7)
IMM GRANULOCYTES # BLD AUTO: 0.01 10*3/MM3 (ref 0–0.05)
IMM GRANULOCYTES NFR BLD AUTO: 0.3 % (ref 0–0.5)
LYMPHOCYTES # BLD AUTO: 0.95 10*3/MM3 (ref 0.7–3.1)
LYMPHOCYTES NFR BLD AUTO: 26.5 % (ref 19.6–45.3)
MCH RBC QN AUTO: 25.1 PG (ref 26.6–33)
MCHC RBC AUTO-ENTMCNC: 30.6 G/DL (ref 31.5–35.7)
MCV RBC AUTO: 82.2 FL (ref 79–97)
MONOCYTES # BLD AUTO: 0.34 10*3/MM3 (ref 0.1–0.9)
MONOCYTES NFR BLD AUTO: 9.5 % (ref 5–12)
NEUTROPHILS NFR BLD AUTO: 2.16 10*3/MM3 (ref 1.7–7)
NEUTROPHILS NFR BLD AUTO: 60.1 % (ref 42.7–76)
NRBC BLD AUTO-RTO: 0 /100 WBC (ref 0–0.2)
PLATELET # BLD AUTO: 128 10*3/MM3 (ref 140–450)
PMV BLD AUTO: 10.1 FL (ref 6–12)
POTASSIUM SERPL-SCNC: 3.9 MMOL/L (ref 3.5–5.2)
PROT SERPL-MCNC: 5.9 G/DL (ref 6–8.5)
RBC # BLD AUTO: 3.54 10*6/MM3 (ref 4.14–5.8)
SODIUM SERPL-SCNC: 135 MMOL/L (ref 136–145)
WBC NRBC COR # BLD: 3.59 10*3/MM3 (ref 3.4–10.8)

## 2022-11-24 PROCEDURE — 63710000001 INSULIN DETEMIR PER 5 UNITS: Performed by: INTERNAL MEDICINE

## 2022-11-24 PROCEDURE — 63710000001 INSULIN ASPART PER 5 UNITS: Performed by: INTERNAL MEDICINE

## 2022-11-24 PROCEDURE — 85025 COMPLETE CBC W/AUTO DIFF WBC: CPT | Performed by: INTERNAL MEDICINE

## 2022-11-24 PROCEDURE — 80053 COMPREHEN METABOLIC PANEL: CPT | Performed by: INTERNAL MEDICINE

## 2022-11-24 PROCEDURE — 86140 C-REACTIVE PROTEIN: CPT | Performed by: INTERNAL MEDICINE

## 2022-11-24 PROCEDURE — 25010000002 CEFTAROLINE FOSAMIL PER 10 MG: Performed by: PHYSICIAN ASSISTANT

## 2022-11-24 PROCEDURE — 25010000002 DAPTOMYCIN PER 1 MG: Performed by: FAMILY MEDICINE

## 2022-11-24 PROCEDURE — 82962 GLUCOSE BLOOD TEST: CPT

## 2022-11-24 PROCEDURE — 25010000002 FONDAPARINUX PER 0.5 MG: Performed by: FAMILY MEDICINE

## 2022-11-24 RX ADMIN — OXYCODONE HYDROCHLORIDE AND ACETAMINOPHEN 500 MG: 500 TABLET ORAL at 08:54

## 2022-11-24 RX ADMIN — FLUDROCORTISONE ACETATE 50 MCG: 0.1 TABLET ORAL at 08:54

## 2022-11-24 RX ADMIN — CEFTAROLINE FOSAMIL 600 MG: 600 POWDER, FOR SOLUTION INTRAVENOUS at 04:13

## 2022-11-24 RX ADMIN — PANTOPRAZOLE SODIUM 40 MG: 40 TABLET, DELAYED RELEASE ORAL at 05:41

## 2022-11-24 RX ADMIN — Medication 150 MG: at 08:54

## 2022-11-24 RX ADMIN — DICLOFENAC 4 G: 10 GEL TOPICAL at 08:55

## 2022-11-24 RX ADMIN — LEVOTHYROXINE SODIUM 25 MCG: 0.07 TABLET ORAL at 05:40

## 2022-11-24 RX ADMIN — ASPIRIN 81 MG: 81 TABLET, COATED ORAL at 08:54

## 2022-11-24 RX ADMIN — CEFTAROLINE FOSAMIL 600 MG: 600 POWDER, FOR SOLUTION INTRAVENOUS at 21:34

## 2022-11-24 RX ADMIN — PREGABALIN 100 MG: 100 CAPSULE ORAL at 08:54

## 2022-11-24 RX ADMIN — CARBIDOPA AND LEVODOPA 7.5 MG: 50; 200 TABLET, EXTENDED RELEASE ORAL at 17:09

## 2022-11-24 RX ADMIN — DICLOFENAC 4 G: 10 GEL TOPICAL at 17:09

## 2022-11-24 RX ADMIN — DICLOFENAC 4 G: 10 GEL TOPICAL at 21:40

## 2022-11-24 RX ADMIN — DAPTOMYCIN 500 MG: 500 INJECTION, POWDER, LYOPHILIZED, FOR SOLUTION INTRAVENOUS at 18:06

## 2022-11-24 RX ADMIN — CARBIDOPA AND LEVODOPA 7.5 MG: 50; 200 TABLET, EXTENDED RELEASE ORAL at 11:55

## 2022-11-24 RX ADMIN — INSULIN DETEMIR 17 UNITS: 100 INJECTION, SOLUTION SUBCUTANEOUS at 21:39

## 2022-11-24 RX ADMIN — INSULIN ASPART 12 UNITS: 100 INJECTION, SOLUTION INTRAVENOUS; SUBCUTANEOUS at 17:01

## 2022-11-24 RX ADMIN — CEFTAROLINE FOSAMIL 600 MG: 600 POWDER, FOR SOLUTION INTRAVENOUS at 11:55

## 2022-11-24 RX ADMIN — CARBIDOPA AND LEVODOPA 7.5 MG: 50; 200 TABLET, EXTENDED RELEASE ORAL at 08:53

## 2022-11-24 RX ADMIN — INSULIN ASPART 5 UNITS: 100 INJECTION, SOLUTION INTRAVENOUS; SUBCUTANEOUS at 11:55

## 2022-11-24 RX ADMIN — FONDAPARINUX SODIUM 2.5 MG: 2.5 INJECTION, SOLUTION SUBCUTANEOUS at 08:54

## 2022-11-24 RX ADMIN — HYDROCODONE BITARTRATE AND ACETAMINOPHEN 1 TABLET: 5; 325 TABLET ORAL at 21:39

## 2022-11-24 RX ADMIN — DICLOFENAC 4 G: 10 GEL TOPICAL at 11:55

## 2022-11-24 RX ADMIN — INSULIN DETEMIR 17 UNITS: 100 INJECTION, SOLUTION SUBCUTANEOUS at 08:54

## 2022-11-24 RX ADMIN — PREGABALIN 100 MG: 100 CAPSULE ORAL at 21:39

## 2022-11-24 RX ADMIN — Medication 1 TABLET: at 08:54

## 2022-11-24 RX ADMIN — CYCLOBENZAPRINE 5 MG: 10 TABLET, FILM COATED ORAL at 21:39

## 2022-11-24 NOTE — PLAN OF CARE
Goal Outcome Evaluation:  Plan of Care Reviewed With: patient           Outcome Evaluation: Pt resting in bed. No complaints voiced and no s/s of distress noted. VSS. Will continue to monitor and continue POC.

## 2022-11-24 NOTE — PROGRESS NOTES
No new complaints, he states his knee is actually not as sore, to examination of his knee it appears less edematous, it is still warm compared to the left leg but less warm, no erythema is noted.  His CRP is trending downward, so far blood cultures from yesterday negative, continue Teflaro and daptomycin, plan surgery next week in [place].  He did have hypoglycemia this morning, have decreased his insulin.  On Arixtra for DVT prophylaxis.

## 2022-11-24 NOTE — NURSING NOTE
Patient had episode of low blood sugar this morning,of 62. Was diaphoretic,felt weak and shaky.  He had orange juice and 2 packs of Bao crackers and came up to 108. Statedhe felt better.

## 2022-11-24 NOTE — PROGRESS NOTES
Rehabilitation Nursing  Inpatient Rehabilitation Plan of Care Note    Plan of Care  Pain    Pain Management (Active)  Current Status (10/28/2022 5:07:00 PM): potential for increased pain  Weekly Goal: pain at a tolerable level  Discharge Goal: no pain    Body Function Structure    Skin Integrity (Active)  Current Status (10/28/2022 5:02:00 PM): impaired skin integrity  Weekly Goal: improved skin integrity  Discharge Goal: wound healing    Safety    Potential for Injury (Active)  Current Status (10/28/2022 5:07:00 PM): potential for falls  Weekly Goal: no falls  Discharge Goal: no falls    Signed by: Ning Albert RN

## 2022-11-24 NOTE — PLAN OF CARE
Goal Outcome Evaluation:  Plan of Care Reviewed With: patient reports right knee pain and tenderness, warmth, MD  aware, no other changes at present. Will continue to monitor.

## 2022-11-25 LAB
GLUCOSE BLDC GLUCOMTR-MCNC: 276 MG/DL (ref 70–130)
GLUCOSE BLDC GLUCOMTR-MCNC: 291 MG/DL (ref 70–130)
GLUCOSE BLDC GLUCOMTR-MCNC: 316 MG/DL (ref 70–130)
GLUCOSE BLDC GLUCOMTR-MCNC: 330 MG/DL (ref 70–130)

## 2022-11-25 PROCEDURE — 97530 THERAPEUTIC ACTIVITIES: CPT | Performed by: OCCUPATIONAL THERAPIST

## 2022-11-25 PROCEDURE — 82962 GLUCOSE BLOOD TEST: CPT

## 2022-11-25 PROCEDURE — 97535 SELF CARE MNGMENT TRAINING: CPT | Performed by: OCCUPATIONAL THERAPIST

## 2022-11-25 PROCEDURE — 97110 THERAPEUTIC EXERCISES: CPT | Performed by: OCCUPATIONAL THERAPIST

## 2022-11-25 PROCEDURE — 25010000002 FONDAPARINUX PER 0.5 MG: Performed by: FAMILY MEDICINE

## 2022-11-25 PROCEDURE — 25010000002 CEFTAROLINE FOSAMIL PER 10 MG: Performed by: PHYSICIAN ASSISTANT

## 2022-11-25 PROCEDURE — 97116 GAIT TRAINING THERAPY: CPT

## 2022-11-25 PROCEDURE — 63710000001 INSULIN DETEMIR PER 5 UNITS: Performed by: INTERNAL MEDICINE

## 2022-11-25 PROCEDURE — 25010000002 DAPTOMYCIN PER 1 MG: Performed by: FAMILY MEDICINE

## 2022-11-25 PROCEDURE — 97110 THERAPEUTIC EXERCISES: CPT

## 2022-11-25 PROCEDURE — 97530 THERAPEUTIC ACTIVITIES: CPT

## 2022-11-25 PROCEDURE — 63710000001 INSULIN ASPART PER 5 UNITS: Performed by: INTERNAL MEDICINE

## 2022-11-25 PROCEDURE — 99231 SBSQ HOSP IP/OBS SF/LOW 25: CPT | Performed by: INTERNAL MEDICINE

## 2022-11-25 RX ORDER — MIDODRINE HYDROCHLORIDE 2.5 MG/1
5 TABLET ORAL
Status: DISCONTINUED | OUTPATIENT
Start: 2022-11-25 | End: 2022-11-26

## 2022-11-25 RX ADMIN — LEVOTHYROXINE SODIUM 25 MCG: 0.07 TABLET ORAL at 05:25

## 2022-11-25 RX ADMIN — INSULIN ASPART 10 UNITS: 100 INJECTION, SOLUTION INTRAVENOUS; SUBCUTANEOUS at 17:11

## 2022-11-25 RX ADMIN — FONDAPARINUX SODIUM 2.5 MG: 2.5 INJECTION, SOLUTION SUBCUTANEOUS at 08:31

## 2022-11-25 RX ADMIN — INSULIN DETEMIR 17 UNITS: 100 INJECTION, SOLUTION SUBCUTANEOUS at 20:46

## 2022-11-25 RX ADMIN — INSULIN DETEMIR 17 UNITS: 100 INJECTION, SOLUTION SUBCUTANEOUS at 08:31

## 2022-11-25 RX ADMIN — FLUDROCORTISONE ACETATE 50 MCG: 0.1 TABLET ORAL at 08:32

## 2022-11-25 RX ADMIN — Medication 150 MG: at 08:31

## 2022-11-25 RX ADMIN — DICLOFENAC 4 G: 10 GEL TOPICAL at 08:32

## 2022-11-25 RX ADMIN — OXYCODONE HYDROCHLORIDE AND ACETAMINOPHEN 500 MG: 500 TABLET ORAL at 08:31

## 2022-11-25 RX ADMIN — CYCLOBENZAPRINE 5 MG: 10 TABLET, FILM COATED ORAL at 05:27

## 2022-11-25 RX ADMIN — PREGABALIN 100 MG: 100 CAPSULE ORAL at 08:31

## 2022-11-25 RX ADMIN — Medication 1 TABLET: at 08:32

## 2022-11-25 RX ADMIN — DICLOFENAC 4 G: 10 GEL TOPICAL at 20:41

## 2022-11-25 RX ADMIN — HYDROCODONE BITARTRATE AND ACETAMINOPHEN 1 TABLET: 5; 325 TABLET ORAL at 05:25

## 2022-11-25 RX ADMIN — PREGABALIN 100 MG: 100 CAPSULE ORAL at 20:42

## 2022-11-25 RX ADMIN — CEFTAROLINE FOSAMIL 600 MG: 600 POWDER, FOR SOLUTION INTRAVENOUS at 04:44

## 2022-11-25 RX ADMIN — ASPIRIN 81 MG: 81 TABLET, COATED ORAL at 08:32

## 2022-11-25 RX ADMIN — INSULIN ASPART 8 UNITS: 100 INJECTION, SOLUTION INTRAVENOUS; SUBCUTANEOUS at 11:49

## 2022-11-25 RX ADMIN — CARBIDOPA AND LEVODOPA 7.5 MG: 50; 200 TABLET, EXTENDED RELEASE ORAL at 06:38

## 2022-11-25 RX ADMIN — DAPTOMYCIN 500 MG: 500 INJECTION, POWDER, LYOPHILIZED, FOR SOLUTION INTRAVENOUS at 18:07

## 2022-11-25 RX ADMIN — INSULIN ASPART 8 UNITS: 100 INJECTION, SOLUTION INTRAVENOUS; SUBCUTANEOUS at 08:31

## 2022-11-25 RX ADMIN — POLYETHYLENE GLYCOL (3350) 17 G: 17 POWDER, FOR SOLUTION ORAL at 08:32

## 2022-11-25 RX ADMIN — CEFTAROLINE FOSAMIL 600 MG: 600 POWDER, FOR SOLUTION INTRAVENOUS at 11:50

## 2022-11-25 RX ADMIN — DICLOFENAC 4 G: 10 GEL TOPICAL at 17:12

## 2022-11-25 RX ADMIN — DICLOFENAC 4 G: 10 GEL TOPICAL at 11:49

## 2022-11-25 RX ADMIN — CARBIDOPA AND LEVODOPA 5 MG: 50; 200 TABLET, EXTENDED RELEASE ORAL at 17:12

## 2022-11-25 RX ADMIN — PANTOPRAZOLE SODIUM 40 MG: 40 TABLET, DELAYED RELEASE ORAL at 05:25

## 2022-11-25 RX ADMIN — CARBIDOPA AND LEVODOPA 5 MG: 50; 200 TABLET, EXTENDED RELEASE ORAL at 11:49

## 2022-11-25 RX ADMIN — CEFTAROLINE FOSAMIL 600 MG: 600 POWDER, FOR SOLUTION INTRAVENOUS at 20:43

## 2022-11-25 RX ADMIN — CYCLOBENZAPRINE 5 MG: 10 TABLET, FILM COATED ORAL at 20:40

## 2022-11-25 NOTE — THERAPY TREATMENT NOTE
Inpatient Rehabilitation - Occupational Therapy Treatment Note    YVONNE Gaines     Patient Name: Melchor Hardwick III  : 1965  MRN: [id number]    Today's Date: 2022                 Admit Date: 10/28/2022         ICD-10-CM ICD-9-CM   1. Staphylococcal arthritis of right knee (HCC)  M00.061 711.06     041.10       Patient Active Problem List   Diagnosis   • Hyperlipidemia   • Hypertensive disorder   • Obesity   • Type 2 diabetes mellitus with hyperglycemia, with long-term current use of insulin (HCC)   • Gas gangrene of foot (HCC)   • Cellulitis in diabetic foot (HCC)   • Other acute osteomyelitis of left foot (HCC)   • Osteomyelitis (HCC)   • Critical illness myopathy   • Staphylococcal arthritis of right knee (HCC)   • Other cirrhosis of liver (HCC)   • MRSA bacteremia   • Endocarditis of tricuspid valve   • Wound of right buttock   • History of osteomyelitis   • Debility       Past Medical History:   Diagnosis Date   • Diabetes mellitus (HCC)    • Hyperlipidemia    • Hypertension    • Pyogenic arthritis of right knee joint, due to unspecified organism (HCC) 2022       Past Surgical History:   Procedure Laterality Date   • ABSCESS DRAINAGE      PERINEUM   • AMPUTATION FOOT Left 2022    Procedure: AMPUTATION FOOT;  Surgeon: Addison Colby MD;  Location: [facility] OR;  Service: Podiatry;  Laterality: Left;   • INCISION AND DRAINAGE LEG Left 05/10/2022    Procedure: INCISION AND DRAINAGE LOWER EXTREMITY;  Surgeon: Addison Colby MD;  Location: [facility] OR;  Service: Podiatry;  Laterality: Left;   • KNEE INCISION AND DRAINAGE Right 2022    Procedure: KNEE INCISION AND DRAINAGE RIGHT;  Surgeon: Brain Bentley MD;  Location: [facility] OR;  Service: Orthopedics;  Laterality: Right;   • WOUND DEBRIDEMENT Left 2022    Procedure: DEBRIDEMENT FOOT;  Surgeon: Addison Colby MD;  Location: [facility] OR;  Service: Podiatry;  Laterality: Left;             IRF OT ASSESSMENT FLOWSHEET (last 12 hours)      IRF OT Evaluation and Treatment     Row Name 11/25/22 1200          OT Time and Intention    Document Type daily treatment  -     Mode of Treatment individual therapy;occupational therapy  -     Patient Effort good  -     Row Name 11/25/22 1200          General Information    Patient/Family/Caregiver Comments/Observations patient agreeable to therapy  -     Existing Precautions/Restrictions fall  -     Row Name 11/25/22 1200          Cognition/Psychosocial    Affect/Mental Status (Cognition) WFL  -     Row Name 11/25/22 1200          Upper Body Dressing    Chase Level (Upper Body Dressing) set up assistance  -     Row Name 11/25/22 1200          Lower Body Dressing    Chase Level (Lower Body Dressing) minimum assist (75% patient effort);don;shoes/slippers;socks;moderate assist (50% patient effort)  -     Row Name 11/25/22 1200          Bed-Chair Transfer    Bed-Chair Chase (Transfers) minimum assist (75% patient effort);contact guard  -     Row Name 11/25/22 1200          Motor Skills    Motor Skills coordination;functional endurance  -     Therapeutic Exercise --  BUE ROM, UE PRE, gmc,fmc,  -     Row Name 11/25/22 1200          Positioning and Restraints    Post Treatment Position wheelchair  -     In Wheelchair sitting;with OT  -           User Key  (r) = Recorded By, (t) = Taken By, (c) = Cosigned By    Initials Name Effective Dates     Jazmin Goldman, OT 06/16/21 -                  Occupational Therapy Education     Title: PT OT SLP Therapies (Done)     Topic: Occupational Therapy (Done)     Point: ADL training (Done)     Description:   Instruct learner(s) on proper safety adaptation and remediation techniques during self care or transfers.   Instruct in proper use of assistive devices.              Learning Progress Summary           Patient Acceptance, E,TB, VU by CORY at 11/24/2022 0154    Eager, E,TB,D, DU,NR by PHYLLIS at 11/23/2022 1322    Acceptance, E,TB,  VU by DL at 11/23/2022 0029    Acceptance, E,TB, VU by DL at 11/22/2022 0442    Acceptance, E,TB, VU by DL at 11/22/2022 0429    Acceptance, E, VU,NR by HB at 11/18/2022 1211    Acceptance, E,TB,D, VU,DU,NR by LA at 11/17/2022 1423    Acceptance, E,TB, VU by DL at 11/17/2022 0233    Acceptance, E, VU,NR by HB at 11/16/2022 1425    Acceptance, E,TB, VU by DL at 11/15/2022 2033    Acceptance, E, VU,NR by HB at 11/15/2022 1242    Acceptance, E,TB, VU by DL at 11/15/2022 0305    Acceptance, E,TB,D, VU,DU,NR by LA at 11/11/2022 1248    Acceptance, E, VU,NR by BF at 11/10/2022 1431    Acceptance, E, VU,NR by HB at 11/9/2022 1230    Acceptance, E,TB, VU by DG at 11/9/2022 0050    Acceptance, E, VU,NR by HB at 11/8/2022 1426    Acceptance, E,TB, VU by DG at 11/8/2022 0114    Acceptance, E, VU,NR by HB at 11/7/2022 1155    Acceptance, E,TB, VU by DG at 11/6/2022 2023    Acceptance, E, VU,NR by HB at 11/4/2022 1153    Acceptance, E, VU,NR by HB at 11/3/2022 1428    Acceptance, E,TB, VU by DG at 11/1/2022 2016    Acceptance, E,TB, VU by DG at 10/31/2022 2315    Acceptance, E, VU,NR by BF at 10/31/2022 1429    Acceptance, E,TB, VU by DL at 10/31/2022 0101    Acceptance, E,TB, VU by DL at 10/29/2022 2321    Acceptance, E, VU,NR by HB at 10/29/2022 1137                   Point: Home exercise program (Done)     Description:   Instruct learner(s) on appropriate technique for monitoring, assisting and/or progressing therapeutic exercises/activities.              Learning Progress Summary           Patient Acceptance, E,TB, VU by DL at 11/24/2022 0154    Eager, E,TB,D, DU,NR by JW at 11/23/2022 1322    Acceptance, E,TB, VU by DL at 11/23/2022 0029    Acceptance, E,TB, VU by DL at 11/22/2022 0442    Acceptance, E,TB, VU by DL at 11/22/2022 0429    Acceptance, E, VU,NR by HB at 11/18/2022 1211    Acceptance, E,TB,D, VU,DU,NR by LA at 11/17/2022 1423    Acceptance, E,TB, VU by DL at 11/17/2022 0233    Acceptance, E, VU,NR by HB at  11/16/2022 1425    Acceptance, E,TB, VU by DL at 11/15/2022 2033    Acceptance, E, VU,NR by HB at 11/15/2022 1242    Acceptance, E,TB, VU by DL at 11/15/2022 0305    Acceptance, E,TB,D, VU,DU,NR by LA at 11/11/2022 1248    Acceptance, E, VU,NR by BF at 11/10/2022 1431    Acceptance, E, VU,NR by HB at 11/9/2022 1230    Acceptance, E,TB, VU by DG at 11/9/2022 0050    Acceptance, E, VU,NR by HB at 11/8/2022 1426    Acceptance, E,TB, VU by DG at 11/8/2022 0114    Acceptance, E, VU,NR by HB at 11/7/2022 1155    Acceptance, E,TB, VU by DG at 11/6/2022 2023    Acceptance, E, VU,NR by HB at 11/4/2022 1153    Acceptance, E, VU,NR by HB at 11/3/2022 1428    Acceptance, E,TB, VU by DG at 11/1/2022 2016    Acceptance, E,TB, VU by DG at 10/31/2022 2315    Acceptance, E,TB, VU by DL at 10/31/2022 0101    Acceptance, E,TB, VU by DL at 10/29/2022 2321    Acceptance, E, VU,NR by HB at 10/29/2022 1137                   Point: Precautions (Done)     Description:   Instruct learner(s) on prescribed precautions during self-care and functional transfers.              Learning Progress Summary           Patient Acceptance, E,TB, VU by DL at 11/24/2022 0154    Eager, E,TB,D, DU,NR by JW at 11/23/2022 1322    Acceptance, E,TB, VU by DL at 11/23/2022 0029    Acceptance, E,TB, VU by DL at 11/22/2022 0442    Acceptance, E,TB, VU by DL at 11/22/2022 0429    Acceptance, E, VU,NR by HB at 11/18/2022 1211    Acceptance, E,TB,D, VU,DU,NR by LA at 11/17/2022 1423    Acceptance, E,TB, VU by DL at 11/17/2022 0233    Acceptance, E, VU,NR by HB at 11/16/2022 1425    Acceptance, E,TB, VU by DL at 11/15/2022 2033    Acceptance, E, VU,NR by HB at 11/15/2022 1242    Acceptance, E,TB, VU by DL at 11/15/2022 0305    Acceptance, E,TB,D, VU,DU,NR by LA at 11/11/2022 1248    Acceptance, E, VU,NR by HB at 11/9/2022 1230    Acceptance, E,TB, VU by DG at 11/9/2022 0050    Acceptance, E, VU,NR by HB at 11/8/2022 1426    Acceptance, E,TB, VU by DG at 11/8/2022 0114     Acceptance, E, VU,NR by HB at 11/7/2022 1155    Acceptance, E,TB, VU by DG at 11/6/2022 2023    Acceptance, E, VU,NR by HB at 11/4/2022 1153    Acceptance, E, VU,NR by HB at 11/3/2022 1428    Acceptance, E,TB, VU by DG at 11/1/2022 2016    Acceptance, E,TB, VU by DG at 10/31/2022 2315    Acceptance, E, VU,NR by BF at 10/31/2022 1429    Acceptance, E,TB, VU by DL at 10/31/2022 0101    Acceptance, E,TB, VU by DL at 10/29/2022 2321    Acceptance, E, VU,NR by HB at 10/29/2022 1137                   Point: Body mechanics (Done)     Description:   Instruct learner(s) on proper positioning and spine alignment during self-care, functional mobility activities and/or exercises.              Learning Progress Summary           Patient Acceptance, E,TB, VU by DL at 11/24/2022 0154    Eager, E,TB,D, DU,NR by JW at 11/23/2022 1322    Acceptance, E,TB, VU by DL at 11/23/2022 0029    Acceptance, E,TB, VU by DL at 11/22/2022 0442    Acceptance, E,TB, VU by DL at 11/22/2022 0429    Acceptance, E, VU,NR by HB at 11/18/2022 1211    Acceptance, E,TB,D, VU,DU,NR by LA at 11/17/2022 1423    Acceptance, E,TB, VU by DL at 11/17/2022 0233    Acceptance, E, VU,NR by HB at 11/16/2022 1425    Acceptance, E,TB, VU by DL at 11/15/2022 2033    Acceptance, E, VU,NR by HB at 11/15/2022 1242    Acceptance, E,TB, VU by DL at 11/15/2022 0305    Acceptance, E,TB,D, VU,DU,NR by LA at 11/11/2022 1248    Acceptance, E, VU,NR by HB at 11/9/2022 1230    Acceptance, E,TB, VU by DG at 11/9/2022 0050    Acceptance, E, VU,NR by HB at 11/8/2022 1426    Acceptance, E,TB, VU by DG at 11/8/2022 0114    Acceptance, E, VU,NR by HB at 11/7/2022 1155    Acceptance, E,TB, VU by DG at 11/6/2022 2023    Acceptance, E, VU,NR by HB at 11/4/2022 1153    Acceptance, E, VU,NR by HB at 11/3/2022 1428    Acceptance, E,TB, VU by DG at 11/1/2022 2016    Acceptance, E,TB, VU by DG at 10/31/2022 2315    Acceptance, E,TB, VU by DL at 10/31/2022 0101    Acceptance, E,TB, VU by DL at  10/29/2022 2321    Acceptance, E, VU,NR by  at 10/29/2022 1137                               User Key     Initials Effective Dates Name Provider Type Discipline    DG 06/16/21 -  Phyllis Yañez, RN Registered Nurse Nurse     06/16/21 -  Mandi Smith, OT Occupational Therapist OT    JW 06/16/21 -  Renny Kramer OTR Occupational Therapist OT    HB 05/25/21 -  Dorothy Rosas OT Occupational Therapist OT    LA 02/14/22 -  Jaqueline Bautista OT Occupational Therapist OT    DL 03/16/22 -  Hellen Mcdonnell RN Registered Nurse Nurse                    OT Recommendation and Plan                         Time Calculation:      Time Calculation- OT     Row Name 11/25/22 1247             Time Calculation-     OT Start Time 0745  -      OT Stop Time 0830  -      OT Time Calculation (min) 45 min  -            User Key  (r) = Recorded By, (t) = Taken By, (c) = Cosigned By    Initials Name Provider Type     Jazmin Goldman, OT Occupational Therapist              Therapy Charges for Today     Code Description Service Date Service Provider Modifiers Qty    43510681951 HC OT SELF CARE/MGMT/TRAIN EA 15 MIN 11/25/2022 Jazmin Goldman OT GO 2    84071127304 HC OT THERAPEUTIC ACT EA 15 MIN 11/25/2022 Jazmin Goldman OT GO 1                   Jazmin Goldman OT  11/25/2022

## 2022-11-25 NOTE — PLAN OF CARE
Goal Outcome Evaluation:  Plan of Care Reviewed With: patient        Progress: improving  Outcome Evaluation: Patient is making progress with therapies. Continue with plan of care.

## 2022-11-25 NOTE — THERAPY RE-EVALUATION
Inpatient Rehabilitation - Physical Therapy Re-Evaluation        [place]     Patient Name: Melchor Hardwick III  : 1965  MRN: [id number]    Today's Date: 2022                    Admit Date: 10/28/2022      Visit Dx:     ICD-10-CM ICD-9-CM   1. Staphylococcal arthritis of right knee (HCC)  M00.061 711.06     041.10       Patient Active Problem List   Diagnosis   • Hyperlipidemia   • Hypertensive disorder   • Obesity   • Type 2 diabetes mellitus with hyperglycemia, with long-term current use of insulin (HCC)   • Gas gangrene of foot (HCC)   • Cellulitis in diabetic foot (HCC)   • Other acute osteomyelitis of left foot (HCC)   • Osteomyelitis (HCC)   • Critical illness myopathy   • Staphylococcal arthritis of right knee (HCC)   • Other cirrhosis of liver (HCC)   • MRSA bacteremia   • Endocarditis of tricuspid valve   • Wound of right buttock   • History of osteomyelitis   • Debility       Past Medical History:   Diagnosis Date   • Diabetes mellitus (HCC)    • Hyperlipidemia    • Hypertension    • Pyogenic arthritis of right knee joint, due to unspecified organism (HCC) 2022       Past Surgical History:   Procedure Laterality Date   • ABSCESS DRAINAGE      PERINEUM   • AMPUTATION FOOT Left 2022    Procedure: AMPUTATION FOOT;  Surgeon: Addison Colby MD;  Location: [facility] OR;  Service: Podiatry;  Laterality: Left;   • INCISION AND DRAINAGE LEG Left 05/10/2022    Procedure: INCISION AND DRAINAGE LOWER EXTREMITY;  Surgeon: Addison Colby MD;  Location: [facility] OR;  Service: Podiatry;  Laterality: Left;   • KNEE INCISION AND DRAINAGE Right 2022    Procedure: KNEE INCISION AND DRAINAGE RIGHT;  Surgeon: Brain Bentley MD;  Location: [facility] OR;  Service: Orthopedics;  Laterality: Right;   • WOUND DEBRIDEMENT Left 2022    Procedure: DEBRIDEMENT FOOT;  Surgeon: Addison Colby MD;  Location: [facility] OR;  Service: Podiatry;  Laterality: Left;       PT ASSESSMENT (last 12 hours)     IRF PT  Evaluation and Treatment     Row Name 11/25/22 1538 11/25/22 1534       PT Time and Intention    Document Type daily treatment;other (see comments)  -RF re-evaluation  2nd session  -LB    Mode of Treatment individual therapy;physical therapy  -RF individual therapy;physical therapy  -LB    Patient/Family/Caregiver Comments/Observations Pt reports severe low back pain and R knee pain this date.  -RF --    Row Name 11/25/22 1538 11/25/22 1534       General Information    Patient Profile Reviewed yes  -RF --    Existing Precautions/Restrictions fall;brace worn when out of bed  air cast LLE  -RF fall  L boot  -LB    Row Name 11/25/22 1534          Pain Scale: FACES Pre/Post-Treatment    Pain: FACES Scale, Pretreatment 4-->hurts little more  -LB     Posttreatment Pain Rating 4-->hurts little more  -LB     Pain Location - back  R knee and L foot  -LB     Pre/Posttreatment Pain Comment rest, repositioned  -LB     Row Name 11/25/22 1538 11/25/22 1534       Cognition/Psychosocial    Affect/Mental Status (Cognition) WFL  -RF WFL  -LB    Follows Commands (Cognition) WFL  -RF WFL  -LB    Personal Safety Interventions -- gait belt;nonskid shoes/slippers when out of bed;supervised activity  -LB    Cognitive Function WFL  -RF --    Row Name 11/25/22 1538 11/25/22 1534       Bed Mobility    Bed Mobility bed mobility (all) activities  -RF --    Sit-Supine [place] (Bed Mobility) -- minimum assist (75% patient effort);verbal cues;nonverbal cues (demo/gesture)  A to lift legs into bed  -LB    Assistive Device (Bed Mobility) -- bed rails  -LB    Row Name 11/25/22 1538          Transfer Assessment/Treatment    Transfers bed-chair transfer;chair-bed transfer;sit-stand transfer;stand-sit transfer;car transfer  -RF     Row Name 11/25/22 1534          Chair-Bed Transfer    Chair-Bed [place] (Transfers) minimum assist (75% patient effort);verbal cues;nonverbal cues (demo/gesture)  -LB     Assistive Device (Chair-Bed Transfers)  wheelchair;walker, front-wheeled  -LB     Row Name 11/25/22 1538 11/25/22 1534       Sit-Stand Transfer    Sit-Stand [place] (Transfers) contact guard;minimum assist (75% patient effort)  -RF minimum assist (75% patient effort);verbal cues;nonverbal cues (demo/gesture)  -LB    Assistive Device (Sit-Stand Transfers) walker, front-wheeled;wheelchair  -RF wheelchair;walker, front-wheeled  -LB    Row Name 11/25/22 1538 11/25/22 1534       Stand-Sit Transfer    Stand-Sit [place] (Transfers) contact guard;minimum assist (75% patient effort)  -RF minimum assist (75% patient effort);verbal cues;nonverbal cues (demo/gesture)  -LB    Assistive Device (Stand-Sit Transfers) wheelchair;walker, front-wheeled  -RF wheelchair;walker, front-wheeled  -LB    Row Name 11/25/22 1538          Gait/Stairs (Locomotion)    Gait/Stairs Locomotion gait/ambulation independence;gait/ambulation assistive device;distance ambulated  -RF     [place] Level (Gait) standby assist;supervision  -RF     Assistive Device (Gait) walker, front-wheeled  -RF     Distance in Feet (Gait) 25  -RF     Pattern (Gait) step-to;step-through  -RF     Deviations/Abnormal Patterns (Gait) right sided deviations;antalgic;stride length decreased;weight shifting decreased  -RF     Bilateral Gait Deviations forward flexed posture  -RF     Comment, (Gait/Stairs) Gait distance hindered due to low back pain  -RF     Row Name 11/25/22 1538 11/25/22 1534       Safety Issues, Functional Mobility    Impairments Affecting Function (Mobility) balance;endurance/activity tolerance;pain;range of motion (ROM);postural/trunk control  -RF balance;endurance/activity tolerance;pain;range of motion (ROM);strength  -LB    Row Name 11/25/22 1534          Balance    Comment, Balance sitting bean bag toss and  with reacher 2x  -LB     Row Name 11/25/22 1534          Motor Skills    Therapeutic Exercise --  sitting ex 2 sets  -LB     Row Name 11/25/22 1538          Hip  (Therapeutic Exercise)    Hip Strengthening (Therapeutic Exercise) bilateral;flexion;extension;aBduction;aDduction;heel slides;marching while seated;sitting;2 lb free weight;resistance band;blue;2 sets;10 repetitions  -RF     Row Name 11/25/22 1538          Knee (Therapeutic Exercise)    Knee Strengthening (Therapeutic Exercise) bilateral;flexion;extension;heel slides;marching while seated;LAQ (long arc quad);hamstring curls;sitting;2 lb free weight;resistance band;blue;2 sets;10 repetitions  -RF     Row Name 11/25/22 1538          Ankle (Therapeutic Exercise)    Ankle Strengthening (Therapeutic Exercise) bilateral;dorsiflexion;plantarflexion;sitting;2 lb free weight;resistance band;blue;2 sets;10 repetitions  -RF     Row Name 11/25/22 1538 11/25/22 1534       Positioning and Restraints    Pre-Treatment Position sitting in chair/recliner  -RF sitting in chair/recliner  -LB    Post Treatment Position wheelchair  -RF --    In Bed -- call light within reach;encouraged to call for assist  -LB    In Wheelchair sitting;with PT  -RF --    Row Name 11/25/22 1538          Daily Progress Summary (PT)    Functional Goal Overall Progress (PT) progressing toward functional goals slower than expected  -RF     Daily Progress Summary (PT) Progress continually hindered due to low back pain and severe R knee pain. Decreased ROM continually noted in RLE.  -RF     Impairments Still Limiting Function (PT) functional activity tolerance impairment;pain;range of motion deficit;strength deficit  -RF     Row Name 11/25/22 1534          Weekly Progress Summary (PT)    Weekly Progress Summary (PT) his progress has been limited by back pain and R knee swelling, pain, and edema. he has been f/u with the MD in [place] for that.  -LB     Impairments Still Limiting Function (PT) balance impairment;functional activity tolerance impairment;pain;range of motion deficit;strength deficit  -LB     Recommendations (PT) continue PT  -LB     Plan for Continued  Service After Discharge (PT) pending medical, he may have to have surgery on his knee  -LB     Row Name 11/25/22 1538          IRF PT Goals    Bed Mobility Goal Selection (PT-IRF) bed mobility, PT goal 1  -RF     Transfer Goal Selection (PT-IRF) transfers, PT goal 1  -RF     Gait (Walking Locomotion) Goal Selection (PT-IRF) gait, PT goal 1  -RF     Row Name 11/25/22 1538 11/25/22 1534       Bed Mobility Goal 1 (PT-IRF)    Activity/Assistive Device (Bed Mobility Goal 1, PT-IRF) bed mobility activities, all  -RF --    [place] Level (Bed Mobility Goal 1, PT-IRF) independent  -RF --    Time Frame (Bed Mobility Goal 1, PT-IRF) long-term goal (LTG)  -RF --    Progress/Outcomes (Bed Mobility Goal 1, PT-IRF) goal met  -RF goal ongoing  -LB    Row Name 11/25/22 1538 11/25/22 1534       Transfer Goal 1 (PT-IRF)    Activity/Assistive Device (Transfer Goal 1, PT-IRF) sit-to-stand/stand-to-sit;bed-to-chair/chair-to-bed;walker, rolling  -RF --    [place] Level (Transfer Goal 1, PT-IRF) modified independence  -RF --    Time Frame (Transfer Goal 1, PT-IRF) long-term goal (LTG)  -RF --    Progress/Outcomes (Transfer Goal 1, PT-IRF) new goal  -RF goal ongoing  -LB    Row Name 11/25/22 1538 11/25/22 1534       Gait/Walking Locomotion Goal 1 (PT-IRF)    Activity/Assistive Device (Gait/Walking Locomotion Goal 1, PT-IRF) gait (walking locomotion);assistive device use  -RF --    Gait/Walking Locomotion Distance Goal 1 (PT-IRF) 100'  -RF --    [place] Level (Gait/Walking Locomotion Goal 1, PT-IRF) modified independence  -RF --    Time Frame (Gait/Walking Locomotion Goal 1, PT-IRF) long-term goal (LTG)  -RF --    Progress/Outcomes (Gait/Walking Locomotion Goal 1, PT-IRF) goal revised this date  -RF goal ongoing  -LB          User Key  (r) = Recorded By, (t) = Taken By, (c) = Cosigned By    Initials Name Provider Type    Willow Spencer, PT Physical Therapist    RF Meron Lei, PTA Physical Therapist Assistant               Wound 09/21/22 1532 Right anterior knee Incision (Active)   Dressing Appearance open to air 11/25/22 0831   Closure Approximated 11/25/22 0831   Base dry;clean;pink 11/25/22 0831   Drainage Amount none 11/25/22 0831   Dressing Care open to air 11/25/22 0831       Wound 10/28/22 1827 Right coccyx Pressure Injury (Active)   Dressing Appearance open to air;no drainage 11/25/22 0831   Closure Open to air 11/25/22 0831   Base pink;blanchable 11/25/22 0831   Drainage Amount none 11/25/22 0831   Care, Wound barrier applied;pressure removed 11/25/22 0831   Dressing Care open to air 11/25/22 0831     Physical Therapy Education     Title: PT OT SLP Therapies (Done)     Topic: Physical Therapy (Done)     Point: Mobility training (Done)     Learning Progress Summary           Patient Acceptance, E, VU,NR by LB at 11/25/2022 1548    Acceptance, E,TB, VU by RF at 11/25/2022 1547    Acceptance, E,TB, VU by DL at 11/24/2022 0154    Acceptance, E,TB, VU by RF at 11/23/2022 1417    Acceptance, E,TB, VU by DL at 11/23/2022 0029    Acceptance, E,TB, VU by RF at 11/22/2022 1609    Acceptance, E,TB, VU by DL at 11/22/2022 0442    Acceptance, E,TB, VU by DL at 11/22/2022 0429    Acceptance, E,TB, VU by RF at 11/21/2022 1522    Acceptance, E,TB, VU,DU by HR at 11/18/2022 1546    Acceptance, E,TB, VU by RF at 11/18/2022 1525    Acceptance, E,TB, VU by RF at 11/17/2022 1528    Acceptance, E,TB, VU by DL at 11/17/2022 0233    Acceptance, E,TB, VU by RF at 11/16/2022 1606    Acceptance, E,TB, VU by DL at 11/15/2022 2033    Acceptance, E,TB, VU by RF at 11/15/2022 1541    Acceptance, E,TB, VU by DL at 11/15/2022 0305    Acceptance, E,TB, VU by RF at 11/14/2022 1528    Acceptance, E,TB, VU by RF at 11/10/2022 1551    Acceptance, E,TB, VU by RF at 11/9/2022 1622    Acceptance, E,TB, VU by DG at 11/9/2022 0050    Acceptance, E,TB, VU by RF at 11/8/2022 1539    Acceptance, E,TB, VU by DG at 11/8/2022 0114    Acceptance, E,TB, VU by RF  at 11/7/2022 1607    Acceptance, E,TB, VU by DG at 11/6/2022 2023    Acceptance, E,TB, VU by RF at 11/4/2022 1551    Acceptance, E,TB, VU,DU by HR at 11/3/2022 1541    Acceptance, E,D, VU,NR by RG at 11/3/2022 1442    Acceptance, E,TB, VU by DG at 11/1/2022 2016    Acceptance, E,D, VU,NR by RG at 11/1/2022 1541    Acceptance, E,TB, VU by DG at 10/31/2022 2315    Acceptance, E,D, VU,NR by RG at 10/31/2022 1446                   Point: Home exercise program (Done)     Learning Progress Summary           Patient Acceptance, E, VU,NR by LB at 11/25/2022 1548    Acceptance, E,TB, VU by RF at 11/25/2022 1547    Acceptance, E,TB, VU by DL at 11/24/2022 0154    Acceptance, E,TB, VU by RF at 11/23/2022 1417    Acceptance, E,TB, VU by DL at 11/23/2022 0029    Acceptance, E,TB, VU by RF at 11/22/2022 1609    Acceptance, E,TB, VU by DL at 11/22/2022 0442    Acceptance, E,TB, VU by DL at 11/22/2022 0429    Acceptance, E,TB, VU by RF at 11/21/2022 1522    Acceptance, E,TB, VU,DU by HR at 11/18/2022 1546    Acceptance, E,TB, VU by RF at 11/18/2022 1525    Acceptance, E,TB, VU by RF at 11/17/2022 1528    Acceptance, E,TB, VU by DL at 11/17/2022 0233    Acceptance, E,TB, VU by RF at 11/16/2022 1606    Acceptance, E,TB, VU by DL at 11/15/2022 2033    Acceptance, E,TB, VU by RF at 11/15/2022 1541    Acceptance, E,TB, VU by DL at 11/15/2022 0305    Acceptance, E,TB, VU by RF at 11/14/2022 1528    Acceptance, E,TB, VU by RF at 11/10/2022 1551    Acceptance, E,TB, VU by RF at 11/9/2022 1622    Acceptance, E,TB, VU by DG at 11/9/2022 0050    Acceptance, E,TB, VU by RF at 11/8/2022 1539    Acceptance, E,TB, VU by DG at 11/8/2022 0114    Acceptance, E,TB, VU by RF at 11/7/2022 1607    Acceptance, E,TB, VU by DG at 11/6/2022 2023    Acceptance, E,TB, VU by RF at 11/4/2022 1551    Acceptance, E,TB, VU,DU by HR at 11/3/2022 1541    Acceptance, E,D, VU,NR by RG at 11/3/2022 1442    Acceptance, E,TB, VU by DG at 11/1/2022 2016    Acceptance,  E,D, VU,NR by RG at 11/1/2022 1541    Acceptance, E,TB, VU by DG at 10/31/2022 2315    Acceptance, E,D, VU,NR by RG at 10/31/2022 1446                   Point: Body mechanics (Done)     Learning Progress Summary           Patient Acceptance, E, VU,NR by LB at 11/25/2022 1548    Acceptance, E,TB, VU by RF at 11/25/2022 1547    Acceptance, E,TB, VU by DL at 11/24/2022 0154    Acceptance, E,TB, VU by RF at 11/23/2022 1417    Acceptance, E,TB, VU by DL at 11/23/2022 0029    Acceptance, E,TB, VU by RF at 11/22/2022 1609    Acceptance, E,TB, VU by DL at 11/22/2022 0442    Acceptance, E,TB, VU by DL at 11/22/2022 0429    Acceptance, E,TB, VU by RF at 11/21/2022 1522    Acceptance, E,TB, VU,DU by HR at 11/18/2022 1546    Acceptance, E,TB, VU by RF at 11/18/2022 1525    Acceptance, E,TB, VU by RF at 11/17/2022 1528    Acceptance, E,TB, VU by DL at 11/17/2022 0233    Acceptance, E,TB, VU by RF at 11/16/2022 1606    Acceptance, E,TB, VU by DL at 11/15/2022 2033    Acceptance, E,TB, VU by RF at 11/15/2022 1541    Acceptance, E,TB, VU by DL at 11/15/2022 0305    Acceptance, E,TB, VU by RF at 11/14/2022 1528    Acceptance, E,TB, VU by RF at 11/10/2022 1551    Acceptance, E,TB, VU by RF at 11/9/2022 1622    Acceptance, E,TB, VU by DG at 11/9/2022 0050    Acceptance, E,TB, VU by RF at 11/8/2022 1539    Acceptance, E,TB, VU by DG at 11/8/2022 0114    Acceptance, E,TB, VU by RF at 11/7/2022 1607    Acceptance, E,TB, VU by DG at 11/6/2022 2023    Acceptance, E,TB, VU by RF at 11/4/2022 1551    Acceptance, E,TB, VU,DU by HR at 11/3/2022 1541    Acceptance, E,D, VU,NR by RG at 11/3/2022 1442    Acceptance, E,TB, VU by DG at 11/1/2022 2016    Acceptance, E,D, VU,NR by RG at 11/1/2022 1541    Acceptance, E,TB, VU by DG at 10/31/2022 2315    Acceptance, E,D, VU,NR by RG at 10/31/2022 1446                   Point: Precautions (Done)     Learning Progress Summary           Patient Acceptance, E, VU,NR by LB at 11/25/2022 1548    Acceptance,  E,TB, VU by RF at 11/25/2022 1547    Acceptance, E,TB, VU by DL at 11/24/2022 0154    Acceptance, E,TB, VU by RF at 11/23/2022 1417    Acceptance, E,TB, VU by DL at 11/23/2022 0029    Acceptance, E,TB, VU by RF at 11/22/2022 1609    Acceptance, E,TB, VU by DL at 11/22/2022 0442    Acceptance, E,TB, VU by DL at 11/22/2022 0429    Acceptance, E,TB, VU by RF at 11/21/2022 1522    Acceptance, E,TB, VU,DU by HR at 11/18/2022 1546    Acceptance, E,TB, VU by RF at 11/18/2022 1525    Acceptance, E,TB, VU by RF at 11/17/2022 1528    Acceptance, E,TB, VU by DL at 11/17/2022 0233    Acceptance, E,TB, VU by RF at 11/16/2022 1606    Acceptance, E,TB, VU by DL at 11/15/2022 2033    Acceptance, E,TB, VU by RF at 11/15/2022 1541    Acceptance, E,TB, VU by DL at 11/15/2022 0305    Acceptance, E,TB, VU by RF at 11/14/2022 1528    Acceptance, E,TB, VU by RF at 11/10/2022 1551    Acceptance, E,TB, VU by RF at 11/9/2022 1622    Acceptance, E,TB, VU by DG at 11/9/2022 0050    Acceptance, E,TB, VU by RF at 11/8/2022 1539    Acceptance, E,TB, VU by DG at 11/8/2022 0114    Acceptance, E,TB, VU by RF at 11/7/2022 1607    Acceptance, E,TB, VU by DG at 11/6/2022 2023    Acceptance, E,TB, VU by RF at 11/4/2022 1551    Acceptance, E,TB, VU,DU by HR at 11/3/2022 1541    Acceptance, E,D, VU,NR by RG at 11/3/2022 1442    Acceptance, E,TB, VU by DG at 11/1/2022 2016    Acceptance, E,D, VU,NR by RG at 11/1/2022 1541    Acceptance, E,TB, VU by  at 10/31/2022 2315    Acceptance, E,D, VU,NR by  at 10/31/2022 1446                               User Key     Initials Effective Dates Name Provider Type Discipline     06/16/21 -  Phyllis Yañez RN Registered Nurse Nurse     06/16/21 -  Willow Villalta, PT Physical Therapist PT    RF 06/16/21 -  Meron Lei, PTA Physical Therapist Assistant PT    RG 06/16/21 -  Michi Mckeon, PTA Physical Therapist Assistant PT    HR 01/14/22 -  Hanna Murphy, LIZETT Physical Therapist Assistant PT    DL  03/16/22 -  Hellen Mcdonnell, RN Registered Nurse Nurse                PT Recommendation and Plan                          Time Calculation:      PT Charges     Row Name 11/25/22 1548 11/25/22 1547          Time Calculation    Start Time 1005  -LB 0920  -RF     Stop Time 1050  -LB 1005  -RF     Time Calculation (min) 45 min  -LB 45 min  -RF     PT Received On 11/25/22  -LB 11/25/22  -RF     PT - Next Appointment -- 11/25/22  -RF     PT Goal Re-Cert Due Date -- 12/02/22  -RF        Time Calculation- PT    Total Timed Code Minutes- PT -- 45 minute(s)  -RF           User Key  (r) = Recorded By, (t) = Taken By, (c) = Cosigned By    Initials Name Provider Type    LB Willow Villalta, PT Physical Therapist    RF Meron Lei, PTA Physical Therapist Assistant                Therapy Charges for Today     Code Description Service Date Service Provider Modifiers Qty    98303005746 HC GAIT TRAINING EA 15 MIN 11/25/2022 Willow Villalta, PT GP 1    38210408062 HC PT THER PROC EA 15 MIN 11/25/2022 Willow Villalta, PT GP 2                   Willow Villalta, PT  11/25/2022

## 2022-11-25 NOTE — THERAPY TREATMENT NOTE
Inpatient Rehabilitation - Occupational Therapy Treatment Note    YVONNE Gaines     Patient Name: Melchor Hardwick III  : 1965  MRN: [id number]    Today's Date: 2022                 Admit Date: 10/28/2022         ICD-10-CM ICD-9-CM   1. Staphylococcal arthritis of right knee (HCC)  M00.061 711.06     041.10       Patient Active Problem List   Diagnosis   • Hyperlipidemia   • Hypertensive disorder   • Obesity   • Type 2 diabetes mellitus with hyperglycemia, with long-term current use of insulin (HCC)   • Gas gangrene of foot (HCC)   • Cellulitis in diabetic foot (HCC)   • Other acute osteomyelitis of left foot (HCC)   • Osteomyelitis (HCC)   • Critical illness myopathy   • Staphylococcal arthritis of right knee (HCC)   • Other cirrhosis of liver (HCC)   • MRSA bacteremia   • Endocarditis of tricuspid valve   • Wound of right buttock   • History of osteomyelitis   • Debility       Past Medical History:   Diagnosis Date   • Diabetes mellitus (HCC)    • Hyperlipidemia    • Hypertension    • Pyogenic arthritis of right knee joint, due to unspecified organism (HCC) 2022       Past Surgical History:   Procedure Laterality Date   • ABSCESS DRAINAGE      PERINEUM   • AMPUTATION FOOT Left 2022    Procedure: AMPUTATION FOOT;  Surgeon: Addison Colby MD;  Location: [facility] OR;  Service: Podiatry;  Laterality: Left;   • INCISION AND DRAINAGE LEG Left 05/10/2022    Procedure: INCISION AND DRAINAGE LOWER EXTREMITY;  Surgeon: Addison Colby MD;  Location: [facility] OR;  Service: Podiatry;  Laterality: Left;   • KNEE INCISION AND DRAINAGE Right 2022    Procedure: KNEE INCISION AND DRAINAGE RIGHT;  Surgeon: Brain Bentley MD;  Location: [facility] OR;  Service: Orthopedics;  Laterality: Right;   • WOUND DEBRIDEMENT Left 2022    Procedure: DEBRIDEMENT FOOT;  Surgeon: Addison Colby MD;  Location: [facility] OR;  Service: Podiatry;  Laterality: Left;             IRF OT ASSESSMENT FLOWSHEET (last 12 hours)      IRF OT Evaluation and Treatment     Row Name 11/25/22 1408 11/25/22 1200       OT Time and Intention    Document Type daily treatment  - daily treatment  -    Mode of Treatment occupational therapy  -BF individual therapy;occupational therapy  -    Patient Effort good  -BF good  -    Symptoms Noted During/After Treatment none  -BF --    Row Name 11/25/22 1408 11/25/22 1200       General Information    Patient/Family/Caregiver Comments/Observations -- patient agreeable to therapy  -    Existing Precautions/Restrictions fall  -BF fall  -    Row Name 11/25/22 1408 11/25/22 1200       Cognition/Psychosocial    Affect/Mental Status (Cognition) -- WFL  -    Orientation Status (Cognition) oriented x 3  -BF --    Follows Commands (Cognition) WFL  - --    Row Name 11/25/22 1200          Upper Body Dressing    [place] Level (Upper Body Dressing) set up assistance  -     Row Name 11/25/22 1200          Lower Body Dressing    [place] Level (Lower Body Dressing) minimum assist (75% patient effort);don;shoes/slippers;socks;moderate assist (50% patient effort)  -     Row Name 11/25/22 1200          Bed-Chair Transfer    Bed-Chair [place] (Transfers) minimum assist (75% patient effort);contact guard  -     Row Name 11/25/22 1408 11/25/22 1200       Motor Skills    Motor Skills -- coordination;functional endurance  -    Motor Control/Coordination Interventions fine motor manipulation/dexterity activities;gross motor coordination activities;therapeutic exercise/ROM  BUE GMC/FMC theract, light strengthening; BUE flexbar therex  - --    Therapeutic Exercise -- --  BUE ROM, UE PRE, gmc,fmc,  -    Row Name 11/25/22 1200          Positioning and Restraints    Post Treatment Position wheelchair  -     In Wheelchair sitting;with OT  -           User Key  (r) = Recorded By, (t) = Taken By, (c) = Cosigned By    Initials Name Effective Dates    BF Mandi Smith, OT 06/16/21 [facility]  Jazmin Goldman, OT 06/16/21 -                  Occupational Therapy Education     Title: PT OT SLP Therapies (Done)     Topic: Occupational Therapy (Done)     Point: ADL training (Done)     Description:   Instruct learner(s) on proper safety adaptation and remediation techniques during self care or transfers.   Instruct in proper use of assistive devices.              Learning Progress Summary           Patient Acceptance, E,TB, VU by DL at 11/24/2022 0154    Eager, E,TB,D, DU,NR by JW at 11/23/2022 1322    Acceptance, E,TB, VU by DL at 11/23/2022 0029    Acceptance, E,TB, VU by DL at 11/22/2022 0442    Acceptance, E,TB, VU by DL at 11/22/2022 0429    Acceptance, E, VU,NR by HB at 11/18/2022 1211    Acceptance, E,TB,D, VU,DU,NR by LA at 11/17/2022 1423    Acceptance, E,TB, VU by DL at 11/17/2022 0233    Acceptance, E, VU,NR by HB at 11/16/2022 1425    Acceptance, E,TB, VU by DL at 11/15/2022 2033    Acceptance, E, VU,NR by HB at 11/15/2022 1242    Acceptance, E,TB, VU by DL at 11/15/2022 0305    Acceptance, E,TB,D, VU,DU,NR by LA at 11/11/2022 1248    Acceptance, E, VU,NR by BF at 11/10/2022 1431    Acceptance, E, VU,NR by HB at 11/9/2022 1230    Acceptance, E,TB, VU by DG at 11/9/2022 0050    Acceptance, E, VU,NR by HB at 11/8/2022 1426    Acceptance, E,TB, VU by DG at 11/8/2022 0114    Acceptance, E, VU,NR by HB at 11/7/2022 1155    Acceptance, E,TB, VU by DG at 11/6/2022 2023    Acceptance, E, VU,NR by HB at 11/4/2022 1153    Acceptance, E, VU,NR by HB at 11/3/2022 1428    Acceptance, E,TB, VU by DG at 11/1/2022 2016    Acceptance, E,TB, VU by DG at 10/31/2022 2315    Acceptance, E, VU,NR by BF at 10/31/2022 1429    Acceptance, E,TB, VU by DL at 10/31/2022 0101    Acceptance, E,TB, VU by DL at 10/29/2022 2321    Acceptance, E, VU,NR by HB at 10/29/2022 1137                   Point: Home exercise program (Done)     Description:   Instruct learner(s) on appropriate technique for monitoring, assisting and/or  progressing therapeutic exercises/activities.              Learning Progress Summary           Patient Acceptance, E,TB, VU by DL at 11/24/2022 0154    Eager, E,TB,D, DU,NR by JW at 11/23/2022 1322    Acceptance, E,TB, VU by DL at 11/23/2022 0029    Acceptance, E,TB, VU by DL at 11/22/2022 0442    Acceptance, E,TB, VU by DL at 11/22/2022 0429    Acceptance, E, VU,NR by HB at 11/18/2022 1211    Acceptance, E,TB,D, VU,DU,NR by LA at 11/17/2022 1423    Acceptance, E,TB, VU by DL at 11/17/2022 0233    Acceptance, E, VU,NR by HB at 11/16/2022 1425    Acceptance, E,TB, VU by DL at 11/15/2022 2033    Acceptance, E, VU,NR by HB at 11/15/2022 1242    Acceptance, E,TB, VU by DL at 11/15/2022 0305    Acceptance, E,TB,D, VU,DU,NR by LA at 11/11/2022 1248    Acceptance, E, VU,NR by BF at 11/10/2022 1431    Acceptance, E, VU,NR by HB at 11/9/2022 1230    Acceptance, E,TB, VU by DG at 11/9/2022 0050    Acceptance, E, VU,NR by HB at 11/8/2022 1426    Acceptance, E,TB, VU by DG at 11/8/2022 0114    Acceptance, E, VU,NR by HB at 11/7/2022 1155    Acceptance, E,TB, VU by DG at 11/6/2022 2023    Acceptance, E, VU,NR by HB at 11/4/2022 1153    Acceptance, E, VU,NR by HB at 11/3/2022 1428    Acceptance, E,TB, VU by DG at 11/1/2022 2016    Acceptance, E,TB, VU by DG at 10/31/2022 2315    Acceptance, E,TB, VU by DL at 10/31/2022 0101    Acceptance, E,TB, VU by DL at 10/29/2022 2321    Acceptance, E, VU,NR by HB at 10/29/2022 1137                   Point: Precautions (Done)     Description:   Instruct learner(s) on prescribed precautions during self-care and functional transfers.              Learning Progress Summary           Patient Acceptance, E,TB, VU by DL at 11/24/2022 0154    Eager, E,TB,D, DU,NR by  at 11/23/2022 1322    Acceptance, E,TB, VU by DL at 11/23/2022 0029    Acceptance, E,TB, VU by DL at 11/22/2022 0442    Acceptance, E,TB, VU by DL at 11/22/2022 0429    Acceptance, E, VU,NR by HB at 11/18/2022 1211    Acceptance,  E,TB,D, VU,DU,NR by LA at 11/17/2022 1423    Acceptance, E,TB, VU by DL at 11/17/2022 0233    Acceptance, E, VU,NR by HB at 11/16/2022 1425    Acceptance, E,TB, VU by DL at 11/15/2022 2033    Acceptance, E, VU,NR by HB at 11/15/2022 1242    Acceptance, E,TB, VU by DL at 11/15/2022 0305    Acceptance, E,TB,D, VU,DU,NR by LA at 11/11/2022 1248    Acceptance, E, VU,NR by HB at 11/9/2022 1230    Acceptance, E,TB, VU by DG at 11/9/2022 0050    Acceptance, E, VU,NR by HB at 11/8/2022 1426    Acceptance, E,TB, VU by DG at 11/8/2022 0114    Acceptance, E, VU,NR by HB at 11/7/2022 1155    Acceptance, E,TB, VU by DG at 11/6/2022 2023    Acceptance, E, VU,NR by HB at 11/4/2022 1153    Acceptance, E, VU,NR by HB at 11/3/2022 1428    Acceptance, E,TB, VU by DG at 11/1/2022 2016    Acceptance, E,TB, VU by DG at 10/31/2022 2315    Acceptance, E, VU,NR by BF at 10/31/2022 1429    Acceptance, E,TB, VU by DL at 10/31/2022 0101    Acceptance, E,TB, VU by DL at 10/29/2022 2321    Acceptance, E, VU,NR by HB at 10/29/2022 1137                   Point: Body mechanics (Done)     Description:   Instruct learner(s) on proper positioning and spine alignment during self-care, functional mobility activities and/or exercises.              Learning Progress Summary           Patient Acceptance, E,TB, VU by DL at 11/24/2022 0154    Eager, E,TB,D, DU,NR by JW at 11/23/2022 1322    Acceptance, E,TB, VU by DL at 11/23/2022 0029    Acceptance, E,TB, VU by DL at 11/22/2022 0442    Acceptance, E,TB, VU by DL at 11/22/2022 0429    Acceptance, E, VU,NR by HB at 11/18/2022 1211    Acceptance, E,TB,D, VU,DU,NR by LA at 11/17/2022 1423    Acceptance, E,TB, VU by DL at 11/17/2022 0233    Acceptance, E, VU,NR by HB at 11/16/2022 1425    Acceptance, E,TB, VU by DL at 11/15/2022 2033    Acceptance, E, VU,NR by HB at 11/15/2022 1242    Acceptance, E,TB, VU by DL at 11/15/2022 0305    Acceptance, E,TB,D, VU,DU,NR by LA at 11/11/2022 1248    Acceptance, E, VU,NR by  HB at 11/9/2022 1230    Acceptance, E,TB, VU by DG at 11/9/2022 0050    Acceptance, E, VU,NR by HB at 11/8/2022 1426    Acceptance, E,TB, VU by DG at 11/8/2022 0114    Acceptance, E, VU,NR by HB at 11/7/2022 1155    Acceptance, E,TB, VU by DG at 11/6/2022 2023    Acceptance, E, VU,NR by HB at 11/4/2022 1153    Acceptance, E, VU,NR by HB at 11/3/2022 1428    Acceptance, E,TB, VU by DG at 11/1/2022 2016    Acceptance, E,TB, VU by DG at 10/31/2022 2315    Acceptance, E,TB, VU by DL at 10/31/2022 0101    Acceptance, E,TB, VU by DL at 10/29/2022 2321    Acceptance, E, VU,NR by HB at 10/29/2022 1137                               User Key     Initials Effective Dates Name Provider Type Discipline     06/16/21 -  Phyllis Yañez, RN Registered Nurse Nurse     06/16/21 -  Mandi Smith, OT Occupational Therapist OT    JW 06/16/21 -  Renny Kramer OTR Occupational Therapist OT     05/25/21 -  Dorothy Rosas OT Occupational Therapist OT    LA 02/14/22 -  Jaqueline Bautista OT Occupational Therapist OT    DL 03/16/22 -  Hellen Mcdonnell RN Registered Nurse Nurse                    OT Recommendation and Plan                         Time Calculation:      Time Calculation- OT     Row Name 11/25/22 1420 11/25/22 1247          Time Calculation- OT    OT Start Time 1055  -BF 0745  -     OT Stop Time 1140  -BF 0830  -     OT Time Calculation (min) 45 min  -BF 45 min  -     Total Timed Code Minutes- OT 45 minute(s)  -BF --     OT Non-Billable Time (min) 10 min  -BF --           User Key  (r) = Recorded By, (t) = Taken By, (c) = Cosigned By    Initials Name Provider Type    BF Mandi Smith, OT Occupational Therapist     Jazmin Goldman, OT Occupational Therapist              Therapy Charges for Today     Code Description Service Date Service Provider Modifiers Qty    [id number] HC OT THER PROC EA 15 MIN 11/25/2022 Mandi Smith OT GO 1    [id number]  OT THERAPEUTIC ACT EA 15 MIN  11/25/2022 Luis, Mandi Freeman, OT GO 2                   Mandi Smith, OT  11/25/2022

## 2022-11-25 NOTE — THERAPY TREATMENT NOTE
Inpatient Rehabilitation - Physical Therapy Treatment Note       YVONNE Gaines     Patient Name: Melchor Hardwick III  : 1965  MRN: [id number]    Today's Date: 2022                    Admit Date: 10/28/2022      Visit Dx:     ICD-10-CM ICD-9-CM   1. Staphylococcal arthritis of right knee (HCC)  M00.061 711.06     041.10       Patient Active Problem List   Diagnosis   • Hyperlipidemia   • Hypertensive disorder   • Obesity   • Type 2 diabetes mellitus with hyperglycemia, with long-term current use of insulin (HCC)   • Gas gangrene of foot (HCC)   • Cellulitis in diabetic foot (HCC)   • Other acute osteomyelitis of left foot (HCC)   • Osteomyelitis (HCC)   • Critical illness myopathy   • Staphylococcal arthritis of right knee (HCC)   • Other cirrhosis of liver (HCC)   • MRSA bacteremia   • Endocarditis of tricuspid valve   • Wound of right buttock   • History of osteomyelitis   • Debility       Past Medical History:   Diagnosis Date   • Diabetes mellitus (HCC)    • Hyperlipidemia    • Hypertension    • Pyogenic arthritis of right knee joint, due to unspecified organism (HCC) 2022       Past Surgical History:   Procedure Laterality Date   • ABSCESS DRAINAGE      PERINEUM   • AMPUTATION FOOT Left 2022    Procedure: AMPUTATION FOOT;  Surgeon: Addison Colby MD;  Location: [facility] OR;  Service: Podiatry;  Laterality: Left;   • INCISION AND DRAINAGE LEG Left 05/10/2022    Procedure: INCISION AND DRAINAGE LOWER EXTREMITY;  Surgeon: Addison Colby MD;  Location:  [facility] OR;  Service: Podiatry;  Laterality: Left;   • KNEE INCISION AND DRAINAGE Right 2022    Procedure: KNEE INCISION AND DRAINAGE RIGHT;  Surgeon: Brain Bentley MD;  Location: [facility] OR;  Service: Orthopedics;  Laterality: Right;   • WOUND DEBRIDEMENT Left 2022    Procedure: DEBRIDEMENT FOOT;  Surgeon: Addison Colby MD;  Location: [facility] OR;  Service: Podiatry;  Laterality: Left;       PT ASSESSMENT (last 12 hours)     IRF PT  Evaluation and Treatment     Row Name 11/25/22 1538 11/25/22 1534       PT Time and Intention    Document Type daily treatment;other (see comments)  -RF re-evaluation  2nd session  -LB    Mode of Treatment individual therapy;physical therapy  -RF individual therapy;physical therapy  -LB    Patient/Family/Caregiver Comments/Observations Pt reports severe low back pain and R knee pain this date.  -RF --    Row Name 11/25/22 1538 11/25/22 1534       General Information    Patient Profile Reviewed yes  -RF --    Existing Precautions/Restrictions fall;brace worn when out of bed  air cast LLE  -RF fall  L boot  -LB    Row Name 11/25/22 1534          Pain Scale: FACES Pre/Post-Treatment    Pain: FACES Scale, Pretreatment 4-->hurts little more  -LB     Posttreatment Pain Rating 4-->hurts little more  -LB     Pain Location - back  R knee and L foot  -LB     Pre/Posttreatment Pain Comment rest, repositioned  -LB     Row Name 11/25/22 1538 11/25/22 1534       Cognition/Psychosocial    Affect/Mental Status (Cognition) WFL  -RF WFL  -LB    Follows Commands (Cognition) WFL  -RF WFL  -LB    Personal Safety Interventions -- gait belt;nonskid shoes/slippers when out of bed;supervised activity  -LB    Cognitive Function WFL  -RF --    Row Name 11/25/22 1538 11/25/22 1534       Bed Mobility    Bed Mobility bed mobility (all) activities  -RF --    Sit-Supine [place] (Bed Mobility) -- minimum assist (75% patient effort);verbal cues;nonverbal cues (demo/gesture)  A to lift legs into bed  -LB    Assistive Device (Bed Mobility) -- bed rails  -LB    Row Name 11/25/22 1538          Transfer Assessment/Treatment    Transfers bed-chair transfer;chair-bed transfer;sit-stand transfer;stand-sit transfer;car transfer  -RF     Row Name 11/25/22 1534          Chair-Bed Transfer    Chair-Bed [place] (Transfers) minimum assist (75% patient effort);verbal cues;nonverbal cues (demo/gesture)  -LB     Assistive Device (Chair-Bed Transfers)  wheelchair;walker, front-wheeled  -LB     Row Name 11/25/22 1538 11/25/22 1534       Sit-Stand Transfer    Sit-Stand [place] (Transfers) contact guard;minimum assist (75% patient effort)  -RF minimum assist (75% patient effort);verbal cues;nonverbal cues (demo/gesture)  -LB    Assistive Device (Sit-Stand Transfers) walker, front-wheeled;wheelchair  -RF wheelchair;walker, front-wheeled  -LB    Row Name 11/25/22 1538 11/25/22 1534       Stand-Sit Transfer    Stand-Sit [place] (Transfers) contact guard;minimum assist (75% patient effort)  -RF minimum assist (75% patient effort);verbal cues;nonverbal cues (demo/gesture)  -LB    Assistive Device (Stand-Sit Transfers) wheelchair;walker, front-wheeled  -RF wheelchair;walker, front-wheeled  -LB    Row Name 11/25/22 1538          Gait/Stairs (Locomotion)    Gait/Stairs Locomotion gait/ambulation independence;gait/ambulation assistive device;distance ambulated  -RF     [place] Level (Gait) standby assist;supervision  -RF     Assistive Device (Gait) walker, front-wheeled  -RF     Distance in Feet (Gait) 25  -RF     Pattern (Gait) step-to;step-through  -RF     Deviations/Abnormal Patterns (Gait) right sided deviations;antalgic;stride length decreased;weight shifting decreased  -RF     Bilateral Gait Deviations forward flexed posture  -RF     Comment, (Gait/Stairs) Gait distance hindered due to low back pain  -RF     Row Name 11/25/22 1538 11/25/22 1534       Safety Issues, Functional Mobility    Impairments Affecting Function (Mobility) balance;endurance/activity tolerance;pain;range of motion (ROM);postural/trunk control  -RF balance;endurance/activity tolerance;pain;range of motion (ROM);strength  -LB    Row Name 11/25/22 1534          Balance    Comment, Balance sitting bean bag toss and  with reacher 2x  -LB     Row Name 11/25/22 1534          Motor Skills    Therapeutic Exercise --  sitting ex 2 sets  -LB     Row Name 11/25/22 1538          Hip  (Therapeutic Exercise)    Hip Strengthening (Therapeutic Exercise) bilateral;flexion;extension;aBduction;aDduction;heel slides;marching while seated;sitting;2 lb free weight;resistance band;blue;2 sets;10 repetitions  -RF     Row Name 11/25/22 1538          Knee (Therapeutic Exercise)    Knee Strengthening (Therapeutic Exercise) bilateral;flexion;extension;heel slides;marching while seated;LAQ (long arc quad);hamstring curls;sitting;2 lb free weight;resistance band;blue;2 sets;10 repetitions  -RF     Row Name 11/25/22 1538          Ankle (Therapeutic Exercise)    Ankle Strengthening (Therapeutic Exercise) bilateral;dorsiflexion;plantarflexion;sitting;2 lb free weight;resistance band;blue;2 sets;10 repetitions  -RF     Row Name 11/25/22 1538 11/25/22 1534       Positioning and Restraints    Pre-Treatment Position sitting in chair/recliner  -RF sitting in chair/recliner  -LB    Post Treatment Position wheelchair  -RF --    In Bed -- call light within reach;encouraged to call for assist  -LB    In Wheelchair sitting;with PT  -RF --    Row Name 11/25/22 1538          Daily Progress Summary (PT)    Functional Goal Overall Progress (PT) progressing toward functional goals slower than expected  -RF     Daily Progress Summary (PT) Progress continually hindered due to low back pain and severe R knee pain. Decreased ROM continually noted in RLE.  -RF     Impairments Still Limiting Function (PT) functional activity tolerance impairment;pain;range of motion deficit;strength deficit  -RF     Row Name 11/25/22 1534          Weekly Progress Summary (PT)    Weekly Progress Summary (PT) his progress has been limited by back pain and R knee swelling, pain, and edema. he has been f/u with the MD in [place] for that.  -LB     Impairments Still Limiting Function (PT) balance impairment;functional activity tolerance impairment;pain;range of motion deficit;strength deficit  -LB     Recommendations (PT) continue PT  -LB     Plan for Continued  Service After Discharge (PT) pending medical, he may have to have surgery on his knee  -LB     Row Name 11/25/22 1538          IRF PT Goals    Bed Mobility Goal Selection (PT-IRF) bed mobility, PT goal 1  -RF     Transfer Goal Selection (PT-IRF) transfers, PT goal 1  -RF     Gait (Walking Locomotion) Goal Selection (PT-IRF) gait, PT goal 1  -RF     Row Name 11/25/22 1538 11/25/22 1534       Bed Mobility Goal 1 (PT-IRF)    Activity/Assistive Device (Bed Mobility Goal 1, PT-IRF) bed mobility activities, all  -RF --    [place] Level (Bed Mobility Goal 1, PT-IRF) independent  -RF --    Time Frame (Bed Mobility Goal 1, PT-IRF) long-term goal (LTG)  -RF --    Progress/Outcomes (Bed Mobility Goal 1, PT-IRF) goal met  -RF goal ongoing  -LB    Row Name 11/25/22 1538 11/25/22 1534       Transfer Goal 1 (PT-IRF)    Activity/Assistive Device (Transfer Goal 1, PT-IRF) sit-to-stand/stand-to-sit;bed-to-chair/chair-to-bed;walker, rolling  -RF --    [place] Level (Transfer Goal 1, PT-IRF) modified independence  -RF --    Time Frame (Transfer Goal 1, PT-IRF) long-term goal (LTG)  -RF --    Progress/Outcomes (Transfer Goal 1, PT-IRF) new goal  -RF goal ongoing  -LB    Row Name 11/25/22 1538 11/25/22 1534       Gait/Walking Locomotion Goal 1 (PT-IRF)    Activity/Assistive Device (Gait/Walking Locomotion Goal 1, PT-IRF) gait (walking locomotion);assistive device use  -RF --    Gait/Walking Locomotion Distance Goal 1 (PT-IRF) 100'  -RF --    [place] Level (Gait/Walking Locomotion Goal 1, PT-IRF) modified independence  -RF --    Time Frame (Gait/Walking Locomotion Goal 1, PT-IRF) long-term goal (LTG)  -RF --    Progress/Outcomes (Gait/Walking Locomotion Goal 1, PT-IRF) goal revised this date  -RF goal ongoing  -LB          User Key  (r) = Recorded By, (t) = Taken By, (c) = Cosigned By    Initials Name Provider Type    Willow Spencer, PT Physical Therapist    RF Meron Lei, PTA Physical Therapist Assistant               Wound 09/21/22 1532 Right anterior knee Incision (Active)   Dressing Appearance open to air 11/25/22 0831   Closure Approximated 11/25/22 0831   Base dry;clean;pink 11/25/22 0831   Drainage Amount none 11/25/22 0831   Dressing Care open to air 11/25/22 0831       Wound 10/28/22 1827 Right coccyx Pressure Injury (Active)   Dressing Appearance open to air;no drainage 11/25/22 0831   Closure Open to air 11/25/22 0831   Base pink;blanchable 11/25/22 0831   Drainage Amount none 11/25/22 0831   Care, Wound barrier applied;pressure removed 11/25/22 0831   Dressing Care open to air 11/25/22 0831     Physical Therapy Education     Title: PT OT SLP Therapies (Done)     Topic: Physical Therapy (Done)     Point: Mobility training (Done)     Learning Progress Summary           Patient Acceptance, E,TB, VU by RF at 11/25/2022 1547    Acceptance, E,TB, VU by DL at 11/24/2022 0154    Acceptance, E,TB, VU by RF at 11/23/2022 1417    Acceptance, E,TB, VU by DL at 11/23/2022 0029    Acceptance, E,TB, VU by RF at 11/22/2022 1609    Acceptance, E,TB, VU by DL at 11/22/2022 0442    Acceptance, E,TB, VU by DL at 11/22/2022 0429    Acceptance, E,TB, VU by RF at 11/21/2022 1522    Acceptance, E,TB, VU,DU by HR at 11/18/2022 1546    Acceptance, E,TB, VU by RF at 11/18/2022 1525    Acceptance, E,TB, VU by RF at 11/17/2022 1528    Acceptance, E,TB, VU by DL at 11/17/2022 0233    Acceptance, E,TB, VU by RF at 11/16/2022 1606    Acceptance, E,TB, VU by DL at 11/15/2022 2033    Acceptance, E,TB, VU by RF at 11/15/2022 1541    Acceptance, E,TB, VU by DL at 11/15/2022 0305    Acceptance, E,TB, VU by RF at 11/14/2022 1528    Acceptance, E,TB, VU by RF at 11/10/2022 1551    Acceptance, E,TB, VU by RF at 11/9/2022 1622    Acceptance, E,TB, VU by DG at 11/9/2022 0050    Acceptance, E,TB, VU by RF at 11/8/2022 1539    Acceptance, E,TB, VU by DG at 11/8/2022 0114    Acceptance, E,TB, VU by RF at 11/7/2022 1607    Acceptance, E,TB, VU by DG  at 11/6/2022 2023    Acceptance, E,TB, VU by RF at 11/4/2022 1551    Acceptance, E,TB, VU,DU by HR at 11/3/2022 1541    Acceptance, E,D, VU,NR by RG at 11/3/2022 1442    Acceptance, E,TB, VU by DG at 11/1/2022 2016    Acceptance, E,D, VU,NR by RG at 11/1/2022 1541    Acceptance, E,TB, VU by DG at 10/31/2022 2315    Acceptance, E,D, VU,NR by RG at 10/31/2022 1446                   Point: Home exercise program (Done)     Learning Progress Summary           Patient Acceptance, E,TB, VU by RF at 11/25/2022 1547    Acceptance, E,TB, VU by DL at 11/24/2022 0154    Acceptance, E,TB, VU by RF at 11/23/2022 1417    Acceptance, E,TB, VU by DL at 11/23/2022 0029    Acceptance, E,TB, VU by RF at 11/22/2022 1609    Acceptance, E,TB, VU by DL at 11/22/2022 0442    Acceptance, E,TB, VU by DL at 11/22/2022 0429    Acceptance, E,TB, VU by RF at 11/21/2022 1522    Acceptance, E,TB, VU,DU by HR at 11/18/2022 1546    Acceptance, E,TB, VU by RF at 11/18/2022 1525    Acceptance, E,TB, VU by RF at 11/17/2022 1528    Acceptance, E,TB, VU by DL at 11/17/2022 0233    Acceptance, E,TB, VU by RF at 11/16/2022 1606    Acceptance, E,TB, VU by DL at 11/15/2022 2033    Acceptance, E,TB, VU by RF at 11/15/2022 1541    Acceptance, E,TB, VU by DL at 11/15/2022 0305    Acceptance, E,TB, VU by RF at 11/14/2022 1528    Acceptance, E,TB, VU by RF at 11/10/2022 1551    Acceptance, E,TB, VU by RF at 11/9/2022 1622    Acceptance, E,TB, VU by DG at 11/9/2022 0050    Acceptance, E,TB, VU by RF at 11/8/2022 1539    Acceptance, E,TB, VU by DG at 11/8/2022 0114    Acceptance, E,TB, VU by RF at 11/7/2022 1607    Acceptance, E,TB, VU by DG at 11/6/2022 2023    Acceptance, E,TB, VU by RF at 11/4/2022 1551    Acceptance, E,TB, VU,DU by HR at 11/3/2022 1541    Acceptance, E,D, VU,NR by RG at 11/3/2022 1442    Acceptance, E,TB, VU by DG at 11/1/2022 2016    Acceptance, E,D, VU,NR by RG at 11/1/2022 1541    Acceptance, E,TB, VU by DG at 10/31/2022 2315    Acceptance,  E,D, VU,NR by RG at 10/31/2022 1446                   Point: Body mechanics (Done)     Learning Progress Summary           Patient Acceptance, E,TB, VU by RF at 11/25/2022 1547    Acceptance, E,TB, VU by DL at 11/24/2022 0154    Acceptance, E,TB, VU by RF at 11/23/2022 1417    Acceptance, E,TB, VU by DL at 11/23/2022 0029    Acceptance, E,TB, VU by RF at 11/22/2022 1609    Acceptance, E,TB, VU by DL at 11/22/2022 0442    Acceptance, E,TB, VU by DL at 11/22/2022 0429    Acceptance, E,TB, VU by RF at 11/21/2022 1522    Acceptance, E,TB, VU,DU by HR at 11/18/2022 1546    Acceptance, E,TB, VU by RF at 11/18/2022 1525    Acceptance, E,TB, VU by RF at 11/17/2022 1528    Acceptance, E,TB, VU by DL at 11/17/2022 0233    Acceptance, E,TB, VU by RF at 11/16/2022 1606    Acceptance, E,TB, VU by DL at 11/15/2022 2033    Acceptance, E,TB, VU by RF at 11/15/2022 1541    Acceptance, E,TB, VU by DL at 11/15/2022 0305    Acceptance, E,TB, VU by RF at 11/14/2022 1528    Acceptance, E,TB, VU by RF at 11/10/2022 1551    Acceptance, E,TB, VU by RF at 11/9/2022 1622    Acceptance, E,TB, VU by DG at 11/9/2022 0050    Acceptance, E,TB, VU by RF at 11/8/2022 1539    Acceptance, E,TB, VU by DG at 11/8/2022 0114    Acceptance, E,TB, VU by RF at 11/7/2022 1607    Acceptance, E,TB, VU by DG at 11/6/2022 2023    Acceptance, E,TB, VU by RF at 11/4/2022 1551    Acceptance, E,TB, VU,DU by HR at 11/3/2022 1541    Acceptance, E,D, VU,NR by RG at 11/3/2022 1442    Acceptance, E,TB, VU by DG at 11/1/2022 2016    Acceptance, E,D, VU,NR by RG at 11/1/2022 1541    Acceptance, E,TB, VU by DG at 10/31/2022 2315    Acceptance, E,D, VU,NR by RG at 10/31/2022 1446                   Point: Precautions (Done)     Learning Progress Summary           Patient Acceptance, E,TB, VU by RF at 11/25/2022 1547    Acceptance, E,TB, VU by DL at 11/24/2022 0154    Acceptance, E,TB, VU by RF at 11/23/2022 1417    Acceptance, E,TB, VU by DL at 11/23/2022 0029    Acceptance,  E,TB, VU by RF at 11/22/2022 1609    Acceptance, E,TB, VU by DL at 11/22/2022 0442    Acceptance, E,TB, VU by DL at 11/22/2022 0429    Acceptance, E,TB, VU by RF at 11/21/2022 1522    Acceptance, E,TB, VU,DU by HR at 11/18/2022 1546    Acceptance, E,TB, VU by RF at 11/18/2022 1525    Acceptance, E,TB, VU by RF at 11/17/2022 1528    Acceptance, E,TB, VU by DL at 11/17/2022 0233    Acceptance, E,TB, VU by RF at 11/16/2022 1606    Acceptance, E,TB, VU by DL at 11/15/2022 2033    Acceptance, E,TB, VU by RF at 11/15/2022 1541    Acceptance, E,TB, VU by DL at 11/15/2022 0305    Acceptance, E,TB, VU by RF at 11/14/2022 1528    Acceptance, E,TB, VU by RF at 11/10/2022 1551    Acceptance, E,TB, VU by RF at 11/9/2022 1622    Acceptance, E,TB, VU by DG at 11/9/2022 0050    Acceptance, E,TB, VU by RF at 11/8/2022 1539    Acceptance, E,TB, VU by DG at 11/8/2022 0114    Acceptance, E,TB, VU by RF at 11/7/2022 1607    Acceptance, E,TB, VU by DG at 11/6/2022 2023    Acceptance, E,TB, VU by RF at 11/4/2022 1551    Acceptance, E,TB, VU,DU by HR at 11/3/2022 1541    Acceptance, E,D, VU,NR by RG at 11/3/2022 1442    Acceptance, E,TB, VU by DG at 11/1/2022 2016    Acceptance, E,D, VU,NR by RG at 11/1/2022 1541    Acceptance, E,TB, VU by DG at 10/31/2022 2315    Acceptance, E,D, VU,NR by RG at 10/31/2022 1446                               User Key     Initials Effective Dates Name Provider Type Discipline    DG 06/16/21 -  Phyllis Yañez, RN Registered Nurse Nurse    RF 06/16/21 -  Meron Lei PTA Physical Therapist Assistant PT    RG 06/16/21 -  Michi Mckeon PTA Physical Therapist Assistant PT    HR 01/14/22 -  Hanna Murphy PTA Physical Therapist Assistant PT    DL 03/16/22 -  Hellen Mcdonnell, RN Registered Nurse Nurse                PT Recommendation and Plan    Frequency of Treatment (PT): 5 times per week, 90 minutes per session     Daily Progress Summary (PT)  Functional Goal Overall Progress (PT): progressing toward  functional goals slower than expected  Daily Progress Summary (PT): Progress continually hindered due to low back pain and severe R knee pain. Decreased ROM continually noted in RLE.  Impairments Still Limiting Function (PT): functional activity tolerance impairment, pain, range of motion deficit, strength deficit  Recommendations (PT): Continue per current POC               Time Calculation:      PT Charges     Row Name 11/25/22 1547             Time Calculation    Start Time 0920  -RF      Stop Time 1005  -RF      Time Calculation (min) 45 min  -RF      PT Received On 11/25/22  -RF      PT - Next Appointment 11/25/22  -RF      PT Goal Re-Cert Due Date 12/02/22  -RF         Time Calculation- PT    Total Timed Code Minutes- PT 45 minute(s)  -RF            User Key  (r) = Recorded By, (t) = Taken By, (c) = Cosigned By    Initials Name Provider Type    RF Meron Lei PTA Physical Therapist Assistant                Therapy Charges for Today     Code Description Service Date Service Provider Modifiers Qty    22647326482 HC GAIT TRAINING EA 15 MIN 11/25/2022 Meron Lei PTA GP, CQ 1    61098208159 HC PT THER PROC EA 15 MIN 11/25/2022 Meron Lei PTA GP, CQ 1    41025579336 HC PT THERAPEUTIC ACT EA 15 MIN 11/25/2022 Meron Lei PTA GP, CQ 1                   Meron Lei PTA  11/25/2022

## 2022-11-25 NOTE — PROGRESS NOTES
"CC: Follow-up on debility due to septic arthritis and bacteremia    Interview History/HPI: Patient states his knee is feeling better, he thinks it is less warm less edematous, really does not hurt when he moves it just feels \"tight\" with range of motion.  Denies chest pain or shortness of breath, tolerating his diet, no fever or chills.          Current Hospital Meds:  ascorbic acid, 500 mg, Oral, Daily  aspirin, 81 mg, Oral, Daily  ceftaroline, 600 mg, Intravenous, Q8H  DAPTOmycin, 6 mg/kg (Adjusted), Intravenous, Q24H  Diclofenac Sodium, 4 g, Topical, 4x Daily  docusate sodium, 100 mg, Oral, BID  fludrocortisone, 50 mcg, Oral, Daily  fondaparinux, 2.5 mg, Subcutaneous, Q24H  Insulin Aspart, 0-14 Units, Subcutaneous, TID AC  insulin detemir, 17 Units, Subcutaneous, Q12H  iron polysaccharides, 150 mg, Oral, Daily  levothyroxine, 25 mcg, Oral, Q AM  lidocaine PF 2%, 10 mL, Injection, Once  midodrine, 7.5 mg, Oral, TID AC  multivitamin with minerals, 1 tablet, Oral, Daily  pantoprazole, 40 mg, Oral, Q AM  polyethylene glycol, 17 g, Oral, Daily  pregabalin, 100 mg, Oral, Q12H    Pharmacy Consult,         Vitals:    11/25/22 0754   BP: 147/77   Pulse: 77   Resp: 18   Temp: 97.9 °F (36.6 °C)   SpO2: 98%         Intake/Output Summary (Last 24 hours) at 11/25/2022 0847  Last data filed at 11/24/2022 1441  Gross per 24 hour   Intake 240 ml   Output 1850 ml   Net -1610 ml       EXAM: Lungs are clear heart regular rate and rhythm no murmur extremities without edema he status post transmetatarsal amputation of the left foot, no skin rashes noted, the incision over the right knee continues to be well-healed no drainage, I do not see significant effusion today.  No erythema, I do think the warmth is lessening.  Of note 11/23 blood culture remains negative to date as well.      Diet: Texture: Regular Texture (IDDSI 7); Fluid Consistency: Regular Consistency; Diabetic Diets; Consistent Carbohydrate        LABS:     Lab Results " (last 48 hours)     Procedure Component Value Units Date/Time    Blood Culture - Blood, Arm, Left [[id number]]  (Abnormal) Collected: 11/22/22 0700    Specimen: Blood from Arm, Left Updated: 11/25/22 0656     Blood Culture Staphylococcus aureus, MRSA     Comment:   Infectious disease consultation is highly recommended to rule out distant foci of infection.  Methicillin resistant Staphylococcus aureus, Patient may be an isolation risk.        Isolated from Aerobic and Anaerobic Bottles     Gram Stain Aerobic Bottle Gram positive cocci in clusters      Anaerobic Bottle Gram positive cocci in clusters    Narrative:      Refer to previous blood culture collected on 11/21    Blood Culture - Blood, Arm, Right [[id number]]  (Abnormal) Collected: 11/22/22 0700    Specimen: Blood from Arm, Right Updated: 11/25/22 0656     Blood Culture Staphylococcus aureus, MRSA     Comment:   Infectious disease consultation is highly recommended to rule out distant foci of infection.  Methicillin resistant Staphylococcus aureus, Patient may be an isolation risk.        Isolated from Pediatric Bottle     Gram Stain Pediatric Bottle Gram positive cocci in clusters    Narrative:      Refer to previous blood culture collected on 11/21 for peña's    Blood Culture - Blood, Arm, Left [[id number]]  (Abnormal) Collected: 11/20/22 2350    Specimen: Blood from Arm, Left Updated: 11/25/22 0655     Blood Culture Staphylococcus aureus, MRSA     Comment:   Infectious disease consultation is highly recommended to rule out distant foci of infection.  Methicillin resistant Staphylococcus aureus, Patient may be an isolation risk.        Isolated from Pediatric Bottle     Gram Stain Pediatric Bottle Gram positive cocci in clusters    Narrative:        Refer to previous blood culture collected on 11/20/2022 at 2355 for MICs.    Blood Culture - Blood, Hand, Left [[id number]]  (Abnormal) Collected: 11/20/22 2355    Specimen: Blood from Hand, Left Updated: 11/25/22  0654     Blood Culture Staphylococcus aureus, MRSA     Comment:   Infectious disease consultation is highly recommended to rule out distant foci of infection.  Methicillin resistant Staphylococcus aureus, Patient may be an isolation risk.        Isolated from Pediatric Bottle     Gram Stain Pediatric Bottle Gram positive cocci in clusters    Narrative:      Dillono to follow    POC Glucose Once [[id number]]  (Abnormal) Collected: 11/25/22 0626    Specimen: Blood Updated: 11/25/22 0635     Glucose 291 mg/dL      Comment: Meter: [id number] : [id number] Susi Velez       POC Glucose Once [[id number]]  (Abnormal) Collected: 11/24/22 1640    Specimen: Blood Updated: 11/24/22 1647     Glucose 368 mg/dL      Comment: RN Notified Meter: [id number] : [id number] ROSARIO SHEPARD       POC Glucose Once [[id number]]  (Abnormal) Collected: 11/24/22 1113    Specimen: Blood Updated: 11/24/22 1120     Glucose 220 mg/dL      Comment: Meter: [id number] : [id number] chey briscoe       Blood Culture - Blood, Arm, Right [[id number]]  (Normal) Collected: 11/23/22 1054    Specimen: Blood from Arm, Right Updated: 11/24/22 1115     Blood Culture No growth at 24 hours    Blood Culture - Blood, Hand, Right [[id number]]  (Normal) Collected: 11/23/22 1054    Specimen: Blood from Hand, Right Updated: 11/24/22 1115     Blood Culture No growth at 24 hours    POC Glucose Once [[id number]]  (Normal) Collected: 11/24/22 0507    Specimen: Blood Updated: 11/24/22 0515     Glucose 108 mg/dL      Comment: Meter: [id number] : [id number] MICHAEL OWENS       POC Glucose Once [[id number]]  (Abnormal) Collected: 11/24/22 0422    Specimen: Blood Updated: 11/24/22 0437     Glucose 62 mg/dL      Comment: Meter: [id number] : [id number] MICHAEL HOLDENON       Comprehensive Metabolic Panel [[id number]]  (Abnormal) Collected: 11/24/22 0112    Specimen: Blood Updated: 11/24/22 0203     Glucose 97 mg/dL      BUN 10 mg/dL      Creatinine 0.69 mg/dL      Sodium  135 mmol/L      Potassium 3.9 mmol/L      Chloride 101 mmol/L      CO2 25.7 mmol/L      Calcium 8.4 mg/dL      Total Protein 5.9 g/dL      Albumin 2.34 g/dL      ALT (SGPT) 22 U/L      AST (SGOT) 32 U/L      Alkaline Phosphatase 249 U/L      Total Bilirubin 0.4 mg/dL      Globulin 3.6 gm/dL      A/G Ratio 0.7 g/dL      BUN/Creatinine Ratio 14.5     Anion Gap 8.3 mmol/L      eGFR 107.9 mL/min/1.73      Comment: National Kidney Foundation and American Society of Nephrology (ASN) Task Force recommended calculation based on the Chronic Kidney Disease Epidemiology Collaboration (CKD-EPI) equation refit without adjustment for race.       Narrative:      GFR Normal >60  Chronic Kidney Disease <60  Kidney Failure <15      C-reactive Protein [[id number]]  (Abnormal) Collected: 11/24/22 0112    Specimen: Blood Updated: 11/24/22 0203     C-Reactive Protein 6.62 mg/dL     CBC & Differential [[id number]]  (Abnormal) Collected: 11/24/22 0112    Specimen: Blood Updated: 11/24/22 0134    Narrative:      The following orders were created for panel order CBC & Differential.  Procedure                               Abnormality         Status                     ---------                               -----------         ------                     CBC Auto Differential[[id number]]        Abnormal            Final result                 Please view results for these tests on the individual orders.    CBC Auto Differential [[id number]]  (Abnormal) Collected: 11/24/22 0112    Specimen: Blood Updated: 11/24/22 0134     WBC 3.59 10*3/mm3      RBC 3.54 10*6/mm3      Hemoglobin 8.9 g/dL      Hematocrit 29.1 %      MCV 82.2 fL      MCH 25.1 pg      MCHC 30.6 g/dL      RDW 16.6 %      RDW-SD 50.4 fl      MPV 10.1 fL      Platelets 128 10*3/mm3      Neutrophil % 60.1 %      Lymphocyte % 26.5 %      Monocyte % 9.5 %      Eosinophil % 3.3 %      Basophil % 0.3 %      Immature Grans % 0.3 %      Neutrophils, Absolute 2.16 10*3/mm3      Lymphocytes,  Absolute 0.95 10*3/mm3      Monocytes, Absolute 0.34 10*3/mm3      Eosinophils, Absolute 0.12 10*3/mm3      Basophils, Absolute 0.01 10*3/mm3      Immature Grans, Absolute 0.01 10*3/mm3      nRBC 0.0 /100 WBC     POC Glucose Once [[id number]]  (Abnormal) Collected: 11/23/22 1954    Specimen: Blood Updated: 11/23/22 2001     Glucose 235 mg/dL      Comment: Meter: [id number] : [id number] YAMILET GAMA       POC Glucose Once [[id number]]  (Abnormal) Collected: 11/23/22 1620    Specimen: Blood Updated: 11/23/22 1627     Glucose 237 mg/dL      Comment: Meter: [id number] : [id number] HERMINIA STOKES       POC Glucose Once [[id number]]  (Abnormal) Collected: 11/23/22 1052    Specimen: Blood Updated: 11/23/22 1059     Glucose 149 mg/dL      Comment: Meter: [id number] : [id number] HERMINIA JAMESN                  Radiology:    Imaging Results (Last 72 Hours)     ** No results found for the last 72 hours. **          Results for orders placed during the hospital encounter of 10/28/22    Adult Transesophageal Echo (TYRESE) W/ Cont if Necessary Per Protocol    Interpretation Summary  •  Indication for TYRESE -to rule out infective endocarditis in a patient with MRSA bacteremia.  •  Findings-  •  Left ventricular systolic function is normal. Estimated left ventricular EF = 60%  •  Left atrial appendage is well visualized and is free of thrombus.  •  Saline test was negative for the evidence of shunt in interatrial septum.  •  The tricuspid valve appeared normal in structure. There was a moderate sized echodense structure seen above  the posterior tricuspid leaflet but not attached to the leaflet. The appearance and location are not typical for regurgitation. This structure could be a normal variant. No evidence of tricuspid valve stenosis or significant regurgitations.  •  Other valves also appear normal in structure with no evidence of stenosis or significant regurgitations.  No vegetations seen on these valves.  •  There is  no evidence of pericardial effusion.  •  Comment-  •  In view of presence of MRSA bacteremia, recommend to extend antibiotic therapy for possible infective endocarditis and repeat TYRESE in 3 months.      Assessment/Plan:   Debility associated with septic arthritis and bacteremia and prolonged hospitalization.  Patient continues to work with PT and OT.  He has minimal assistance for bed mobility, contact-guard for bed to chair and chair to bed.  Contact-guard for sit stand and stand sit.  He is walking 40 feet with a front wheel walker standby assist.  Patient is using assistive device for lower body dressing.  He continues to work with occupational therapy for ADLs, fine motor movements, strengthening and range of motion.    Septic arthritis with bacteremia.  He is on Teflaro he is completing this course as prescribed by ID also on daptomycin.  Will check CPK in the morning, his most recent blood culture is negative from the 23rd.  Overall his knee appears to be looking better, there was minimal effusion when this was tapped by orthopedics in [place] we will continue current antibiotic coverage, recheck inflammatory marker in the a.m., patient is planning to go to [place] next Wednesday for possible spacer placement.    Diabetes, wide fluctuations, due to hypoglycemia I decreased his insulin yesterday however he now is hyperglycemic, will follow for 24 more hours and adjust as needed.  His bacteremia has contributed to poor control of his diabetes.    DVT prophylaxis, continue Arixtra    Orthostasis, continue midodrine and wean doing well.    Ja Bowers MD

## 2022-11-25 NOTE — PLAN OF CARE
Goal Outcome Evaluation:               Pt is progressing medically and physically with all therapies, continue with current plan of care.   alone

## 2022-11-26 LAB
ALBUMIN SERPL-MCNC: 2.31 G/DL (ref 3.5–5.2)
ALBUMIN/GLOB SERPL: 0.7 G/DL
ALP SERPL-CCNC: 235 U/L (ref 39–117)
ALT SERPL W P-5'-P-CCNC: 23 U/L (ref 1–41)
ANION GAP SERPL CALCULATED.3IONS-SCNC: 6.8 MMOL/L (ref 5–15)
AST SERPL-CCNC: 30 U/L (ref 1–40)
BACTERIA SPEC AEROBE CULT: ABNORMAL
BACTERIA SPEC AEROBE CULT: ABNORMAL
BASOPHILS # BLD AUTO: 0.01 10*3/MM3 (ref 0–0.2)
BASOPHILS NFR BLD AUTO: 0.2 % (ref 0–1.5)
BILIRUB SERPL-MCNC: 0.3 MG/DL (ref 0–1.2)
BUN SERPL-MCNC: 11 MG/DL (ref 6–20)
BUN/CREAT SERPL: 15.9 (ref 7–25)
CALCIUM SPEC-SCNC: 8 MG/DL (ref 8.6–10.5)
CHLORIDE SERPL-SCNC: 100 MMOL/L (ref 98–107)
CK SERPL-CCNC: 26 U/L (ref 20–200)
CO2 SERPL-SCNC: 26.2 MMOL/L (ref 22–29)
CREAT SERPL-MCNC: 0.69 MG/DL (ref 0.76–1.27)
CRP SERPL-MCNC: 5.45 MG/DL (ref 0–0.5)
DEPRECATED RDW RBC AUTO: 49.6 FL (ref 37–54)
EGFRCR SERPLBLD CKD-EPI 2021: 107.9 ML/MIN/1.73
EOSINOPHIL # BLD AUTO: 0.09 10*3/MM3 (ref 0–0.4)
EOSINOPHIL NFR BLD AUTO: 2.1 % (ref 0.3–6.2)
ERYTHROCYTE [DISTWIDTH] IN BLOOD BY AUTOMATED COUNT: 16.5 % (ref 12.3–15.4)
GLOBULIN UR ELPH-MCNC: 3.3 GM/DL
GLUCOSE BLDC GLUCOMTR-MCNC: 130 MG/DL (ref 70–130)
GLUCOSE BLDC GLUCOMTR-MCNC: 218 MG/DL (ref 70–130)
GLUCOSE BLDC GLUCOMTR-MCNC: 223 MG/DL (ref 70–130)
GLUCOSE BLDC GLUCOMTR-MCNC: 271 MG/DL (ref 70–130)
GLUCOSE SERPL-MCNC: 227 MG/DL (ref 65–99)
GRAM STN SPEC: ABNORMAL
GRAM STN SPEC: ABNORMAL
HCT VFR BLD AUTO: 27.3 % (ref 37.5–51)
HGB BLD-MCNC: 8.7 G/DL (ref 13–17.7)
IMM GRANULOCYTES # BLD AUTO: 0.02 10*3/MM3 (ref 0–0.05)
IMM GRANULOCYTES NFR BLD AUTO: 0.5 % (ref 0–0.5)
ISOLATED FROM: ABNORMAL
ISOLATED FROM: ABNORMAL
LYMPHOCYTES # BLD AUTO: 0.8 10*3/MM3 (ref 0.7–3.1)
LYMPHOCYTES NFR BLD AUTO: 19 % (ref 19.6–45.3)
MCH RBC QN AUTO: 26 PG (ref 26.6–33)
MCHC RBC AUTO-ENTMCNC: 31.9 G/DL (ref 31.5–35.7)
MCV RBC AUTO: 81.7 FL (ref 79–97)
MONOCYTES # BLD AUTO: 0.3 10*3/MM3 (ref 0.1–0.9)
MONOCYTES NFR BLD AUTO: 7.1 % (ref 5–12)
NEUTROPHILS NFR BLD AUTO: 2.98 10*3/MM3 (ref 1.7–7)
NEUTROPHILS NFR BLD AUTO: 71.1 % (ref 42.7–76)
NRBC BLD AUTO-RTO: 0 /100 WBC (ref 0–0.2)
PLATELET # BLD AUTO: 111 10*3/MM3 (ref 140–450)
PMV BLD AUTO: 9.5 FL (ref 6–12)
POTASSIUM SERPL-SCNC: 3.9 MMOL/L (ref 3.5–5.2)
PROT SERPL-MCNC: 5.6 G/DL (ref 6–8.5)
RBC # BLD AUTO: 3.34 10*6/MM3 (ref 4.14–5.8)
SODIUM SERPL-SCNC: 133 MMOL/L (ref 136–145)
WBC NRBC COR # BLD: 4.2 10*3/MM3 (ref 3.4–10.8)

## 2022-11-26 PROCEDURE — 99308 SBSQ NF CARE LOW MDM 20: CPT | Performed by: NURSE PRACTITIONER

## 2022-11-26 PROCEDURE — 25010000002 CEFTAROLINE FOSAMIL PER 10 MG: Performed by: PHYSICIAN ASSISTANT

## 2022-11-26 PROCEDURE — 63710000001 INSULIN DETEMIR PER 5 UNITS: Performed by: INTERNAL MEDICINE

## 2022-11-26 PROCEDURE — 85025 COMPLETE CBC W/AUTO DIFF WBC: CPT | Performed by: INTERNAL MEDICINE

## 2022-11-26 PROCEDURE — 25010000002 DAPTOMYCIN PER 1 MG: Performed by: FAMILY MEDICINE

## 2022-11-26 PROCEDURE — 86140 C-REACTIVE PROTEIN: CPT | Performed by: INTERNAL MEDICINE

## 2022-11-26 PROCEDURE — 97116 GAIT TRAINING THERAPY: CPT

## 2022-11-26 PROCEDURE — 25010000002 FONDAPARINUX PER 0.5 MG: Performed by: FAMILY MEDICINE

## 2022-11-26 PROCEDURE — 97112 NEUROMUSCULAR REEDUCATION: CPT

## 2022-11-26 PROCEDURE — 80053 COMPREHEN METABOLIC PANEL: CPT | Performed by: INTERNAL MEDICINE

## 2022-11-26 PROCEDURE — 82962 GLUCOSE BLOOD TEST: CPT

## 2022-11-26 PROCEDURE — 97110 THERAPEUTIC EXERCISES: CPT

## 2022-11-26 PROCEDURE — 97530 THERAPEUTIC ACTIVITIES: CPT

## 2022-11-26 PROCEDURE — 99232 SBSQ HOSP IP/OBS MODERATE 35: CPT | Performed by: INTERNAL MEDICINE

## 2022-11-26 PROCEDURE — 63710000001 INSULIN ASPART PER 5 UNITS: Performed by: INTERNAL MEDICINE

## 2022-11-26 PROCEDURE — 82550 ASSAY OF CK (CPK): CPT | Performed by: INTERNAL MEDICINE

## 2022-11-26 RX ORDER — INSULIN ASPART 100 [IU]/ML
4 INJECTION, SOLUTION INTRAVENOUS; SUBCUTANEOUS
Status: DISCONTINUED | OUTPATIENT
Start: 2022-11-26 | End: 2022-11-30 | Stop reason: HOSPADM

## 2022-11-26 RX ORDER — MIDODRINE HYDROCHLORIDE 2.5 MG/1
2.5 TABLET ORAL
Status: DISCONTINUED | OUTPATIENT
Start: 2022-11-26 | End: 2022-11-30 | Stop reason: HOSPADM

## 2022-11-26 RX ADMIN — OXYCODONE HYDROCHLORIDE AND ACETAMINOPHEN 500 MG: 500 TABLET ORAL at 08:05

## 2022-11-26 RX ADMIN — POLYETHYLENE GLYCOL (3350) 17 G: 17 POWDER, FOR SOLUTION ORAL at 08:06

## 2022-11-26 RX ADMIN — CYCLOBENZAPRINE 5 MG: 10 TABLET, FILM COATED ORAL at 05:19

## 2022-11-26 RX ADMIN — CARBIDOPA AND LEVODOPA 2.5 MG: 50; 200 TABLET, EXTENDED RELEASE ORAL at 12:02

## 2022-11-26 RX ADMIN — DICLOFENAC 4 G: 10 GEL TOPICAL at 18:05

## 2022-11-26 RX ADMIN — PREGABALIN 100 MG: 100 CAPSULE ORAL at 20:51

## 2022-11-26 RX ADMIN — CEFTAROLINE FOSAMIL 600 MG: 600 POWDER, FOR SOLUTION INTRAVENOUS at 03:51

## 2022-11-26 RX ADMIN — Medication 150 MG: at 08:05

## 2022-11-26 RX ADMIN — INSULIN ASPART 4 UNITS: 100 INJECTION, SOLUTION INTRAVENOUS; SUBCUTANEOUS at 17:13

## 2022-11-26 RX ADMIN — ASPIRIN 81 MG: 81 TABLET, COATED ORAL at 08:05

## 2022-11-26 RX ADMIN — PREGABALIN 100 MG: 100 CAPSULE ORAL at 08:06

## 2022-11-26 RX ADMIN — INSULIN ASPART 8 UNITS: 100 INJECTION, SOLUTION INTRAVENOUS; SUBCUTANEOUS at 12:02

## 2022-11-26 RX ADMIN — CARBIDOPA AND LEVODOPA 5 MG: 50; 200 TABLET, EXTENDED RELEASE ORAL at 06:30

## 2022-11-26 RX ADMIN — DAPTOMYCIN 500 MG: 500 INJECTION, POWDER, LYOPHILIZED, FOR SOLUTION INTRAVENOUS at 18:05

## 2022-11-26 RX ADMIN — Medication 1 TABLET: at 08:06

## 2022-11-26 RX ADMIN — INSULIN DETEMIR 17 UNITS: 100 INJECTION, SOLUTION SUBCUTANEOUS at 20:51

## 2022-11-26 RX ADMIN — PANTOPRAZOLE SODIUM 40 MG: 40 TABLET, DELAYED RELEASE ORAL at 05:20

## 2022-11-26 RX ADMIN — INSULIN DETEMIR 17 UNITS: 100 INJECTION, SOLUTION SUBCUTANEOUS at 08:05

## 2022-11-26 RX ADMIN — INSULIN ASPART 5 UNITS: 100 INJECTION, SOLUTION INTRAVENOUS; SUBCUTANEOUS at 17:12

## 2022-11-26 RX ADMIN — DICLOFENAC 4 G: 10 GEL TOPICAL at 08:06

## 2022-11-26 RX ADMIN — DICLOFENAC 4 G: 10 GEL TOPICAL at 12:04

## 2022-11-26 RX ADMIN — FLUDROCORTISONE ACETATE 50 MCG: 0.1 TABLET ORAL at 08:05

## 2022-11-26 RX ADMIN — LEVOTHYROXINE SODIUM 25 MCG: 0.07 TABLET ORAL at 05:20

## 2022-11-26 RX ADMIN — HYDROCODONE BITARTRATE AND ACETAMINOPHEN 1 TABLET: 5; 325 TABLET ORAL at 05:19

## 2022-11-26 RX ADMIN — CARBIDOPA AND LEVODOPA 2.5 MG: 50; 200 TABLET, EXTENDED RELEASE ORAL at 17:13

## 2022-11-26 RX ADMIN — INSULIN ASPART 4 UNITS: 100 INJECTION, SOLUTION INTRAVENOUS; SUBCUTANEOUS at 12:02

## 2022-11-26 RX ADMIN — DICLOFENAC 4 G: 10 GEL TOPICAL at 20:51

## 2022-11-26 RX ADMIN — FONDAPARINUX SODIUM 2.5 MG: 2.5 INJECTION, SOLUTION SUBCUTANEOUS at 08:05

## 2022-11-26 NOTE — THERAPY TREATMENT NOTE
Inpatient Rehabilitation - Physical Therapy Treatment Note       YVONNE Gaines     Patient Name: Melchor Hardwick III  : 1965  MRN: [id number]    Today's Date: 2022                    Admit Date: 10/28/2022      Visit Dx:     ICD-10-CM ICD-9-CM   1. Staphylococcal arthritis of right knee (HCC)  M00.061 711.06     041.10       Patient Active Problem List   Diagnosis   • Hyperlipidemia   • Hypertensive disorder   • Obesity   • Type 2 diabetes mellitus with hyperglycemia, with long-term current use of insulin (HCC)   • Gas gangrene of foot (HCC)   • Cellulitis in diabetic foot (HCC)   • Other acute osteomyelitis of left foot (HCC)   • Osteomyelitis (HCC)   • Critical illness myopathy   • Staphylococcal arthritis of right knee (HCC)   • Other cirrhosis of liver (HCC)   • MRSA bacteremia   • Endocarditis of tricuspid valve   • Wound of right buttock   • History of osteomyelitis   • Debility       Past Medical History:   Diagnosis Date   • Diabetes mellitus (HCC)    • Hyperlipidemia    • Hypertension    • Pyogenic arthritis of right knee joint, due to unspecified organism (HCC) 2022       Past Surgical History:   Procedure Laterality Date   • ABSCESS DRAINAGE      PERINEUM   • AMPUTATION FOOT Left 2022    Procedure: AMPUTATION FOOT;  Surgeon: Addison Colby MD;  Location: [facility] OR;  Service: Podiatry;  Laterality: Left;   • INCISION AND DRAINAGE LEG Left 05/10/2022    Procedure: INCISION AND DRAINAGE LOWER EXTREMITY;  Surgeon: Addison Colby MD;  Location:  [facility] OR;  Service: Podiatry;  Laterality: Left;   • KNEE INCISION AND DRAINAGE Right 2022    Procedure: KNEE INCISION AND DRAINAGE RIGHT;  Surgeon: Brain Bentley MD;  Location: [facility] OR;  Service: Orthopedics;  Laterality: Right;   • WOUND DEBRIDEMENT Left 2022    Procedure: DEBRIDEMENT FOOT;  Surgeon: Addison Colby MD;  Location: [facility] OR;  Service: Podiatry;  Laterality: Left;       PT ASSESSMENT (last 12 hours)     IRF PT  Evaluation and Treatment     Row Name 11/26/22 1506          PT Time and Intention    Document Type daily treatment;other (see comments)  -RF     Mode of Treatment individual therapy;physical therapy  -RF     Patient/Family/Caregiver Comments/Observations Pt reports low back pain is better, but still severe. Pt also reports continued R knee pain.  -RF     Row Name 11/26/22 1506          General Information    Patient Profile Reviewed yes  -RF     Existing Precautions/Restrictions fall;brace worn when out of bed  air cast LLE  -RF     Row Name 11/26/22 1506          Cognition/Psychosocial    Affect/Mental Status (Cognition) WFL  -RF     Follows Commands (Cognition) WFL  -RF     Personal Safety Interventions gait belt;nonskid shoes/slippers when out of bed;fall prevention program maintained  -RF     Cognitive Function WFL  -RF     Row Name 11/26/22 1506          Bed Mobility    Bed Mobility bed mobility (all) activities  -RF     Sit-Supine [place] (Bed Mobility) minimum assist (75% patient effort)  -RF     Assistive Device (Bed Mobility) bed rails  -     Row Name 11/26/22 1506          Transfer Assessment/Treatment    Transfers bed-chair transfer;chair-bed transfer;sit-stand transfer;stand-sit transfer;car transfer  -     Row Name 11/26/22 1506          Chair-Bed Transfer    Chair-Bed [place] (Transfers) contact guard;standby assist  -RF     Assistive Device (Chair-Bed Transfers) wheelchair;walker, front-wheeled  -RF     Row Name 11/26/22 1506          Sit-Stand Transfer    Sit-Stand [place] (Transfers) contact guard;standby assist  -RF     Assistive Device (Sit-Stand Transfers) walker, front-wheeled;wheelchair  -     Row Name 11/26/22 1506          Stand-Sit Transfer    Stand-Sit [place] (Transfers) contact guard;standby assist  -RF     Assistive Device (Stand-Sit Transfers) wheelchair;walker, front-wheeled  -     Row Name 11/26/22 1506          Gait/Stairs (Locomotion)    Gait/Stairs  Locomotion gait/ambulation independence;gait/ambulation assistive device;distance ambulated  -RF     [place] Level (Gait) standby assist;supervision  -RF     Assistive Device (Gait) walker, front-wheeled  -RF     Distance in Feet (Gait) 60  -RF     Pattern (Gait) step-to;step-through  -RF     Deviations/Abnormal Patterns (Gait) right sided deviations;antalgic;stride length decreased;weight shifting decreased  -RF     Bilateral Gait Deviations forward flexed posture  -RF     Comment, (Gait/Stairs) Forward flexed posture noted; increased knee flexion observed bilaterally.  -RF     Row Name 11/26/22 1506          Safety Issues, Functional Mobility    Impairments Affecting Function (Mobility) balance;endurance/activity tolerance;pain;range of motion (ROM);postural/trunk control  -     Row Name 11/26/22 1506          Balance    Dynamic Sitting Balance modified independence  bag toss/ with reacher with reach outside CODIE; pt cued for WB on LEs. Pt also performed bicep curl, chest press, overhead press, and circumduction with #2 ball in unsupported sit.  -     Row Name 11/26/22 1506          Hip (Therapeutic Exercise)    Hip Strengthening (Therapeutic Exercise) bilateral;flexion;extension;aBduction;aDduction;heel slides;marching while seated;sitting;3 lb free weight;resistance band;blue;2 sets;10 repetitions  -     Row Name 11/26/22 1506          Knee (Therapeutic Exercise)    Knee Strengthening (Therapeutic Exercise) bilateral;flexion;extension;heel slides;marching while seated;LAQ (long arc quad);hamstring curls;sitting;3 lb free weight;resistance band;blue;2 sets;10 repetitions  -     Row Name 11/26/22 1506          Ankle (Therapeutic Exercise)    Ankle Strengthening (Therapeutic Exercise) bilateral;dorsiflexion;plantarflexion;sitting;3 lb free weight;2 sets;10 repetitions  -     Row Name 11/26/22 1506          Aerobic Exercise    Type (Aerobic Exercise) recumbent stationary bike  pt unable to  complete full revolution; pt performed arching motion for B knee flexion stretching.  -RF     Time Performed (Aerobic Exercise) 15  -RF     Comment, Aerobic Exercise (Therapeutic Exercise) LVL 1.0  -RF     Row Name 11/26/22 1506          Positioning and Restraints    Pre-Treatment Position sitting in chair/recliner  -RF     Post Treatment Position bed  -RF     In Bed supine;call light within reach;encouraged to call for assist  -RF     Row Name 11/26/22 1506          Daily Progress Summary (PT)    Functional Goal Overall Progress (PT) progressing toward functional goals slower than expected  -RF     Daily Progress Summary (PT) Mobility still significantly limited due to low back and knee pain. Functional mobility and ambulation fair; CGA required.  -RF     Impairments Still Limiting Function (PT) functional activity tolerance impairment;pain;range of motion deficit;strength deficit  -RF     Recommendations (PT) Continue per current POC  -RF     Row Name 11/26/22 1506          IRF PT Goals    Bed Mobility Goal Selection (PT-IRF) bed mobility, PT goal 1  -RF     Transfer Goal Selection (PT-IRF) transfers, PT goal 1  -RF     Gait (Walking Locomotion) Goal Selection (PT-IRF) gait, PT goal 1  -RF     Row Name 11/26/22 1506          Bed Mobility Goal 1 (PT-IRF)    Activity/Assistive Device (Bed Mobility Goal 1, PT-IRF) bed mobility activities, all  -RF     [place] Level (Bed Mobility Goal 1, PT-IRF) independent  -RF     Time Frame (Bed Mobility Goal 1, PT-IRF) long-term goal (LTG)  -RF     Progress/Outcomes (Bed Mobility Goal 1, PT-IRF) goal met  -RF     Row Name 11/26/22 1506          Transfer Goal 1 (PT-IRF)    Activity/Assistive Device (Transfer Goal 1, PT-IRF) sit-to-stand/stand-to-sit;bed-to-chair/chair-to-bed;walker, rolling  -RF     [place] Level (Transfer Goal 1, PT-IRF) modified independence  -RF     Time Frame (Transfer Goal 1, PT-IRF) long-term goal (LTG)  -RF     Progress/Outcomes (Transfer Goal 1,  PT-IRF) new goal  -RF     Row Name 11/26/22 1506          Gait/Walking Locomotion Goal 1 (PT-IRF)    Activity/Assistive Device (Gait/Walking Locomotion Goal 1, PT-IRF) gait (walking locomotion);assistive device use  -RF     Gait/Walking Locomotion Distance Goal 1 (PT-IRF) 100'  -RF     [place] Level (Gait/Walking Locomotion Goal 1, PT-IRF) modified independence  -RF     Time Frame (Gait/Walking Locomotion Goal 1, PT-IRF) long-term goal (LTG)  -RF     Progress/Outcomes (Gait/Walking Locomotion Goal 1, PT-IRF) goal revised this date  -RF           User Key  (r) = Recorded By, (t) = Taken By, (c) = Cosigned By    Initials Name Provider Type    RF Meron Lei PTA Physical Therapist Assistant              Wound 09/21/22 1532 Right anterior knee Incision (Active)   Dressing Appearance open to air 11/26/22 0805   Closure Approximated 11/26/22 0805   Base dry;clean;pink 11/26/22 0805   Drainage Amount none 11/26/22 0805   Care, Wound other (see comments) 11/25/22 1915   Dressing Care open to air 11/26/22 0805       Wound 10/28/22 1827 Right coccyx Pressure Injury (Active)   Dressing Appearance open to air 11/26/22 0805   Closure Open to air 11/26/22 0805   Base pink;blanchable 11/26/22 0805   Drainage Amount none 11/26/22 0805   Care, Wound barrier applied;pressure removed 11/26/22 0805   Dressing Care open to air 11/26/22 0805     Physical Therapy Education     Title: PT OT SLP Therapies (Done)     Topic: Physical Therapy (Done)     Point: Mobility training (Done)     Learning Progress Summary           Patient Acceptance, E,TB, VU by RF at 11/26/2022 1512    Acceptance, E, VU,NR by LB at 11/25/2022 1548    Acceptance, E,TB, VU by RF at 11/25/2022 1547    Acceptance, E,TB, VU by DL at 11/24/2022 0154    Acceptance, E,TB, VU by RF at 11/23/2022 1417    Acceptance, E,TB, VU by DL at 11/23/2022 0029    Acceptance, E,TB, VU by RF at 11/22/2022 1609    Acceptance, E,TB, VU by DL at 11/22/2022 0442    Acceptance,  E,TB, VU by DL at 11/22/2022 0429    Acceptance, E,TB, VU by RF at 11/21/2022 1522    Acceptance, E,TB, VU,DU by HR at 11/18/2022 1546    Acceptance, E,TB, VU by RF at 11/18/2022 1525    Acceptance, E,TB, VU by RF at 11/17/2022 1528    Acceptance, E,TB, VU by DL at 11/17/2022 0233    Acceptance, E,TB, VU by RF at 11/16/2022 1606    Acceptance, E,TB, VU by DL at 11/15/2022 2033    Acceptance, E,TB, VU by RF at 11/15/2022 1541    Acceptance, E,TB, VU by DL at 11/15/2022 0305    Acceptance, E,TB, VU by RF at 11/14/2022 1528    Acceptance, E,TB, VU by RF at 11/10/2022 1551    Acceptance, E,TB, VU by RF at 11/9/2022 1622    Acceptance, E,TB, VU by DG at 11/9/2022 0050    Acceptance, E,TB, VU by RF at 11/8/2022 1539    Acceptance, E,TB, VU by DG at 11/8/2022 0114    Acceptance, E,TB, VU by RF at 11/7/2022 1607    Acceptance, E,TB, VU by DG at 11/6/2022 2023    Acceptance, E,TB, VU by RF at 11/4/2022 1551    Acceptance, E,TB, VU,DU by HR at 11/3/2022 1541    Acceptance, E,D, VU,NR by RG at 11/3/2022 1442    Acceptance, E,TB, VU by DG at 11/1/2022 2016    Acceptance, E,D, VU,NR by RG at 11/1/2022 1541    Acceptance, E,TB, VU by DG at 10/31/2022 2315    Acceptance, E,D, VU,NR by RG at 10/31/2022 1446                   Point: Home exercise program (Done)     Learning Progress Summary           Patient Acceptance, E,TB, VU by RF at 11/26/2022 1512    Acceptance, E, VU,NR by LB at 11/25/2022 1548    Acceptance, E,TB, VU by RF at 11/25/2022 1547    Acceptance, E,TB, VU by DL at 11/24/2022 0154    Acceptance, E,TB, VU by RF at 11/23/2022 1417    Acceptance, E,TB, VU by DL at 11/23/2022 0029    Acceptance, E,TB, VU by RF at 11/22/2022 1609    Acceptance, E,TB, VU by DL at 11/22/2022 0442    Acceptance, E,TB, VU by DL at 11/22/2022 0429    Acceptance, E,TB, VU by RF at 11/21/2022 1522    Acceptance, E,TB, VU,DU by HR at 11/18/2022 1546    Acceptance, E,TB, VU by RF at 11/18/2022 1525    Acceptance, E,TB, VU by RF at 11/17/2022 1528     Acceptance, E,TB, VU by DL at 11/17/2022 0233    Acceptance, E,TB, VU by RF at 11/16/2022 1606    Acceptance, E,TB, VU by DL at 11/15/2022 2033    Acceptance, E,TB, VU by RF at 11/15/2022 1541    Acceptance, E,TB, VU by DL at 11/15/2022 0305    Acceptance, E,TB, VU by RF at 11/14/2022 1528    Acceptance, E,TB, VU by RF at 11/10/2022 1551    Acceptance, E,TB, VU by RF at 11/9/2022 1622    Acceptance, E,TB, VU by DG at 11/9/2022 0050    Acceptance, E,TB, VU by RF at 11/8/2022 1539    Acceptance, E,TB, VU by DG at 11/8/2022 0114    Acceptance, E,TB, VU by RF at 11/7/2022 1607    Acceptance, E,TB, VU by DG at 11/6/2022 2023    Acceptance, E,TB, VU by RF at 11/4/2022 1551    Acceptance, E,TB, VU,DU by HR at 11/3/2022 1541    Acceptance, E,D, VU,NR by RG at 11/3/2022 1442    Acceptance, E,TB, VU by DG at 11/1/2022 2016    Acceptance, E,D, VU,NR by RG at 11/1/2022 1541    Acceptance, E,TB, VU by DG at 10/31/2022 2315    Acceptance, E,D, VU,NR by RG at 10/31/2022 1446                   Point: Body mechanics (Done)     Learning Progress Summary           Patient Acceptance, E,TB, VU by RF at 11/26/2022 1512    Acceptance, E, VU,NR by LB at 11/25/2022 1548    Acceptance, E,TB, VU by RF at 11/25/2022 1547    Acceptance, E,TB, VU by DL at 11/24/2022 0154    Acceptance, E,TB, VU by RF at 11/23/2022 1417    Acceptance, E,TB, VU by DL at 11/23/2022 0029    Acceptance, E,TB, VU by RF at 11/22/2022 1609    Acceptance, E,TB, VU by DL at 11/22/2022 0442    Acceptance, E,TB, VU by DL at 11/22/2022 0429    Acceptance, E,TB, VU by RF at 11/21/2022 1522    Acceptance, E,TB, VU,DU by HR at 11/18/2022 1546    Acceptance, E,TB, VU by RF at 11/18/2022 1525    Acceptance, E,TB, VU by RF at 11/17/2022 1528    Acceptance, E,TB, VU by DL at 11/17/2022 0233    Acceptance, E,TB, VU by RF at 11/16/2022 1606    Acceptance, E,TB, VU by DL at 11/15/2022 2033    Acceptance, E,TB, VU by RF at 11/15/2022 1541    Acceptance, E,TB, VU by DL at 11/15/2022  0305    Acceptance, E,TB, VU by RF at 11/14/2022 1528    Acceptance, E,TB, VU by RF at 11/10/2022 1551    Acceptance, E,TB, VU by RF at 11/9/2022 1622    Acceptance, E,TB, VU by DG at 11/9/2022 0050    Acceptance, E,TB, VU by RF at 11/8/2022 1539    Acceptance, E,TB, VU by DG at 11/8/2022 0114    Acceptance, E,TB, VU by RF at 11/7/2022 1607    Acceptance, E,TB, VU by DG at 11/6/2022 2023    Acceptance, E,TB, VU by RF at 11/4/2022 1551    Acceptance, E,TB, VU,DU by HR at 11/3/2022 1541    Acceptance, E,D, VU,NR by RG at 11/3/2022 1442    Acceptance, E,TB, VU by DG at 11/1/2022 2016    Acceptance, E,D, VU,NR by RG at 11/1/2022 1541    Acceptance, E,TB, VU by DG at 10/31/2022 2315    Acceptance, E,D, VU,NR by RG at 10/31/2022 1446                   Point: Precautions (Done)     Learning Progress Summary           Patient Acceptance, E,TB, VU by RF at 11/26/2022 1512    Acceptance, E, VU,NR by LB at 11/25/2022 1548    Acceptance, E,TB, VU by RF at 11/25/2022 1547    Acceptance, E,TB, VU by DL at 11/24/2022 0154    Acceptance, E,TB, VU by RF at 11/23/2022 1417    Acceptance, E,TB, VU by DL at 11/23/2022 0029    Acceptance, E,TB, VU by RF at 11/22/2022 1609    Acceptance, E,TB, VU by DL at 11/22/2022 0442    Acceptance, E,TB, VU by DL at 11/22/2022 0429    Acceptance, E,TB, VU by RF at 11/21/2022 1522    Acceptance, E,TB, VU,DU by HR at 11/18/2022 1546    Acceptance, E,TB, VU by RF at 11/18/2022 1525    Acceptance, E,TB, VU by RF at 11/17/2022 1528    Acceptance, E,TB, VU by DL at 11/17/2022 0233    Acceptance, E,TB, VU by RF at 11/16/2022 1606    Acceptance, E,TB, VU by DL at 11/15/2022 2033    Acceptance, E,TB, VU by RF at 11/15/2022 1541    Acceptance, E,TB, VU by DL at 11/15/2022 0305    Acceptance, E,TB, VU by RF at 11/14/2022 1528    Acceptance, E,TB, VU by RF at 11/10/2022 1551    Acceptance, E,TB, VU by RF at 11/9/2022 1622    Acceptance, E,TB, VU by DG at 11/9/2022 0050    Acceptance, E,TB, VU by RF at 11/8/2022  1539    Acceptance, E,TB, VU by DG at 11/8/2022 0114    Acceptance, E,TB, VU by RF at 11/7/2022 1607    Acceptance, E,TB, VU by DG at 11/6/2022 2023    Acceptance, E,TB, VU by RF at 11/4/2022 1551    Acceptance, E,TB, VU,DU by HR at 11/3/2022 1541    Acceptance, E,D, VU,NR by RG at 11/3/2022 1442    Acceptance, E,TB, VU by DG at 11/1/2022 2016    Acceptance, E,D, VU,NR by RG at 11/1/2022 1541    Acceptance, E,TB, VU by DG at 10/31/2022 2315    Acceptance, E,D, VU,NR by RG at 10/31/2022 1446                               User Key     Initials Effective Dates Name Provider Type Discipline    DG 06/16/21 -  Phyllis Yañez, RN Registered Nurse Nurse    LB 06/16/21 -  Willow Villalta, PT Physical Therapist PT    RF 06/16/21 -  Meron Lei PTA Physical Therapist Assistant PT    RG 06/16/21 -  Michi Mckeon, LIZETT Physical Therapist Assistant PT    HR 01/14/22 -  Hanna Murphy, LIZETT Physical Therapist Assistant PT    DL 03/16/22 -  Hellen Mcdonnell, RN Registered Nurse Nurse                PT Recommendation and Plan    Frequency of Treatment (PT): 5 times per week, 90 minutes per session     Daily Progress Summary (PT)  Functional Goal Overall Progress (PT): progressing toward functional goals slower than expected  Daily Progress Summary (PT): Mobility still significantly limited due to low back and knee pain. Functional mobility and ambulation fair; CGA required.  Impairments Still Limiting Function (PT): functional activity tolerance impairment, pain, range of motion deficit, strength deficit  Recommendations (PT): Continue per current POC               Time Calculation:      PT Charges     Row Name 11/26/22 1512             Time Calculation    Start Time 0915  -RF      Stop Time 1045  -RF      Time Calculation (min) 90 min  -RF      PT Received On 11/26/22  -RF      PT - Next Appointment 11/28/22  -RF      PT Goal Re-Cert Due Date 12/02/22  -RF         Time Calculation- PT    Total Timed Code Minutes- PT 90  minute(s)  -RF            User Key  (r) = Recorded By, (t) = Taken By, (c) = Cosigned By    Initials Name Provider Type    RF Meron Lei, LIZETT Physical Therapist Assistant                Therapy Charges for Today     Code Description Service Date Service Provider Modifiers Qty    [id number]  GAIT TRAINING EA 15 MIN 11/25/2022 Meron Lei, PTA GP, CQ 1    [id number]  PT THER PROC EA 15 MIN 11/25/2022 Meron Lei, PTA GP, CQ 1    [id number]  PT THERAPEUTIC ACT EA 15 MIN 11/25/2022 Meron Lei, PTA GP, CQ 1    [id number] HC GAIT TRAINING EA 15 MIN 11/26/2022 Meron Lei, PTA GP, CQ 1    [id number]  PT THER PROC EA 15 MIN 11/26/2022 Meron Lei, LIZETT GP, CQ 3    [id number]  PT NEUROMUSC RE EDUCATION EA 15 MIN 11/26/2022 Meron Lei, PTA GP, CQ 2                   Meron Lei, LIZETT  11/26/2022

## 2022-11-26 NOTE — THERAPY TREATMENT NOTE
Inpatient Rehabilitation - Occupational Therapy Treatment Note    YVONNE Gaines     Patient Name: Melchor Hardwick III  : 1965  MRN: [id number]    Today's Date: 2022                 Admit Date: 10/28/2022    Patient and RN agreeable to OT treatment session and for patient to transition out of bed for activity. Patient with complaints of back pain that he reports is chronic; not numerically rated. RN aware.          ICD-10-CM ICD-9-CM   1. Staphylococcal arthritis of right knee (HCC)  M00.061 711.06     041.10       Patient Active Problem List   Diagnosis   • Hyperlipidemia   • Hypertensive disorder   • Obesity   • Type 2 diabetes mellitus with hyperglycemia, with long-term current use of insulin (HCC)   • Gas gangrene of foot (HCC)   • Cellulitis in diabetic foot (HCC)   • Other acute osteomyelitis of left foot (HCC)   • Osteomyelitis (HCC)   • Critical illness myopathy   • Staphylococcal arthritis of right knee (HCC)   • Other cirrhosis of liver (HCC)   • MRSA bacteremia   • Endocarditis of tricuspid valve   • Wound of right buttock   • History of osteomyelitis   • Debility       Past Medical History:   Diagnosis Date   • Diabetes mellitus (HCC)    • Hyperlipidemia    • Hypertension    • Pyogenic arthritis of right knee joint, due to unspecified organism (HCC) 2022       Past Surgical History:   Procedure Laterality Date   • ABSCESS DRAINAGE      PERINEUM   • AMPUTATION FOOT Left 2022    Procedure: AMPUTATION FOOT;  Surgeon: Addison Colby MD;  Location: [facility];  Service: Podiatry;  Laterality: Left;   • INCISION AND DRAINAGE LEG Left 05/10/2022    Procedure: INCISION AND DRAINAGE LOWER EXTREMITY;  Surgeon: Addison Colby MD;  Location: [facility] OR;  Service: Podiatry;  Laterality: Left;   • KNEE INCISION AND DRAINAGE Right 2022    Procedure: KNEE INCISION AND DRAINAGE RIGHT;  Surgeon: Brain Bentley MD;  Location: [facility] OR;  Service: Orthopedics;  Laterality: Right;   • WOUND  DEBRIDEMENT Left 05/13/2022    Procedure: DEBRIDEMENT FOOT;  Surgeon: Addison Colby MD;  Location: [facility];  Service: Podiatry;  Laterality: Left;             IRF OT ASSESSMENT FLOWSHEET (last 12 hours)     IRF OT Evaluation and Treatment     Row Name 11/26/22 0915          OT Time and Intention    Document Type daily treatment  -     Mode of Treatment occupational therapy;individual therapy  -KH     Total Minutes, Occupational Therapy 90  -KH     Patient Effort good  -     Symptoms Noted During/After Treatment none  -     Row Name 11/26/22 0915          General Information    Patient Profile Reviewed yes  -KH     Patient/Family/Caregiver Comments/Observations Patient and RN agreeable to OT treatment session and for patient to transition out of bed for activity  -     General Observations of Patient Patient received in bed, agreeable for transition to wheelchair for activity. Patient reports chronic back pain this AM, does not numerically rate; RN aware  -     Existing Precautions/Restrictions fall;brace worn when out of bed  LLE aircast  -     Row Name 11/26/22 0915          Pain Assessment    Pretreatment Pain Rating --  chronic pain not rated numerically this date  -     Row Name 11/26/22 0915          Cognition/Psychosocial    Affect/Mental Status (Cognition) [facility]     Orientation Status (Cognition) oriented x 3  -     Follows Commands (Cognition) [facility]  -     Personal Safety Interventions fall prevention program maintained;gait belt;supervised activity;nonskid shoes/slippers when out of bed  -     Cognitive Function WFL  -     Row Name 11/26/22 0915          Lower Body Dressing    Position (Lower Body Dressing) supine  -     Set-up Assistance (Lower Body Dressing) don  aircast  -     Comment (Lower Body Dressing) Patient required Total A to kayleigh LLE aircast from supine this date  -     Row Name 11/26/22 0915          Mobility    Left Lower Extremity (Weight-bearing Status)  weight-bearing as tolerated (WBAT);other (see comments)  w/ LLE aircast in place  -     Right Lower Extremity (Weight-bearing Status) weight-bearing as tolerated (WBAT);other (see comments)  -     Row Name 11/26/22 0915          Bed Mobility    Bed Mobility supine-sit  -     Supine-Sit [place] (Bed Mobility) minimum assist (75% patient effort);1 person assist;verbal cues  -     Row Name 11/26/22 0915          Transfer Assessment/Treatment    Transfers bed-chair transfer  -     Row Name 11/26/22 0915          Bed-Chair Transfer    Bed-Chair [place] (Transfers) minimum assist (75% patient effort);1 person assist;verbal cues  -     Assistive Device (Bed-Chair Transfers) walker, front-wheeled;wheelchair  gait belt  -     Comment, (Bed-Chair Transfer) Increased time to reach fully upright prior to initiation of stand pivot transfer  -     Row Name 11/26/22 0915          Motor Skills    Motor Skills coordination;functional endurance;motor control/coordination interventions  -     Functional Endurance Patient engaged in BUE Arm Restorator x 5 min x 4 reps with 0 resistance and rest as needed. Patient additionally completed BUE Rickshaw w/ 25 pounds x 100 total reps with rest as needed.  -     Motor Control/Coordination Interventions bilateral/bimanual training;fine motor manipulation/dexterity activities;gross motor coordination activities;occupation/activity based treatment;therapeutic exercise/ROM  -     Therapeutic Exercise shoulder;elbow/forearm;wrist;hand  BUE wrist rolls x 2 reps w/ 2# wrist weights for added resistance  -     Row Name 11/26/22 0915          Positioning and Restraints    Pre-Treatment Position in bed  -     Post Treatment Position wheelchair  -     In Wheelchair notified nsg;sitting;with PT;with brace  LLE aircast; wheelchair locked  -     Row Name 11/26/22 0915          Daily Progress Summary (OT)    Overall Progress Toward Functional Goals (OT) progressing  toward functional goals as expected  -KH     Daily Progress Summary (OT) Patient tolerated OT treatment session foused on functional endurance, ADL, and strengthening tasks well this date with rest as needed. Patient demos increased upright activity tolerance this date, however, continues to demo deficits in functional transfers, ADL performance, and endurance at this time and would continue to benefit from skilled inpatient OT treatment to address deficits and increase safety and independence.  -KH     Impairments Still Limiting Function (OT) pain  -KH           User Key  (r) = Recorded By, (t) = Taken By, (c) = Cosigned By    Initials Name Effective Dates    Ashley Mercer, OT 04/17/18 -                  Occupational Therapy Education     Title: PT OT SLP Therapies (Done)     Topic: Occupational Therapy (Done)     Point: ADL training (Done)     Description:   Instruct learner(s) on proper safety adaptation and remediation techniques during self care or transfers.   Instruct in proper use of assistive devices.              Learning Progress Summary           Patient Acceptance, E,TB, VU by DL at 11/24/2022 0154    Eager, E,TB,D, DU,NR by JW at 11/23/2022 1322    Acceptance, E,TB, VU by DL at 11/23/2022 0029    Acceptance, E,TB, VU by DL at 11/22/2022 0442    Acceptance, E,TB, VU by DL at 11/22/2022 0429    Acceptance, E, VU,NR by HB at 11/18/2022 1211    Acceptance, E,TB,D, VU,DU,NR by LA at 11/17/2022 1423    Acceptance, E,TB, VU by DL at 11/17/2022 0233    Acceptance, E, VU,NR by HB at 11/16/2022 1425    Acceptance, E,TB, VU by DL at 11/15/2022 2033    Acceptance, E, VU,NR by HB at 11/15/2022 1242    Acceptance, E,TB, VU by DL at 11/15/2022 0305    Acceptance, E,TB,D, VU,DU,NR by LA at 11/11/2022 1248    Acceptance, E, VU,NR by BF at 11/10/2022 1431    Acceptance, E, VU,NR by HB at 11/9/2022 1230    Acceptance, E,TB, VU by DG at 11/9/2022 0050    Acceptance, E, FLORECITA,NR by  at 11/8/2022 1423     Acceptance, E,TB, VU by DG at 11/8/2022 0114    Acceptance, E, VU,NR by HB at 11/7/2022 1155    Acceptance, E,TB, VU by DG at 11/6/2022 2023    Acceptance, E, VU,NR by HB at 11/4/2022 1153    Acceptance, E, VU,NR by HB at 11/3/2022 1428    Acceptance, E,TB, VU by DG at 11/1/2022 2016    Acceptance, E,TB, VU by DG at 10/31/2022 2315    Acceptance, E, VU,NR by BF at 10/31/2022 1429    Acceptance, E,TB, VU by DL at 10/31/2022 0101    Acceptance, E,TB, VU by DL at 10/29/2022 2321    Acceptance, E, VU,NR by HB at 10/29/2022 1137                   Point: Home exercise program (Done)     Description:   Instruct learner(s) on appropriate technique for monitoring, assisting and/or progressing therapeutic exercises/activities.              Learning Progress Summary           Patient Acceptance, E,TB, VU by DL at 11/24/2022 0154    Eager, E,TB,D, DU,NR by JW at 11/23/2022 1322    Acceptance, E,TB, VU by DL at 11/23/2022 0029    Acceptance, E,TB, VU by DL at 11/22/2022 0442    Acceptance, E,TB, VU by DL at 11/22/2022 0429    Acceptance, E, VU,NR by HB at 11/18/2022 1211    Acceptance, E,TB,D, VU,DU,NR by LA at 11/17/2022 1423    Acceptance, E,TB, VU by DL at 11/17/2022 0233    Acceptance, E, VU,NR by HB at 11/16/2022 1425    Acceptance, E,TB, VU by DL at 11/15/2022 2033    Acceptance, E, VU,NR by HB at 11/15/2022 1242    Acceptance, E,TB, VU by DL at 11/15/2022 0305    Acceptance, E,TB,D, VU,DU,NR by LA at 11/11/2022 1248    Acceptance, E, VU,NR by BF at 11/10/2022 1431    Acceptance, E, VU,NR by HB at 11/9/2022 1230    Acceptance, E,TB, VU by DG at 11/9/2022 0050    Acceptance, E, VU,NR by HB at 11/8/2022 1426    Acceptance, E,TB, VU by DG at 11/8/2022 0114    Acceptance, E, VU,NR by HB at 11/7/2022 1155    Acceptance, E,TB, VU by DG at 11/6/2022 2023    Acceptance, E, VU,NR by HB at 11/4/2022 1153    Acceptance, E, VU,NR by HB at 11/3/2022 1428    Acceptance, E,TB, VU by DG at 11/1/2022 2016    Acceptance, E,TB, VU by DG at  10/31/2022 2315    Acceptance, E,TB, VU by DL at 10/31/2022 0101    Acceptance, E,TB, VU by DL at 10/29/2022 2321    Acceptance, E, VU,NR by HB at 10/29/2022 1137                   Point: Precautions (Done)     Description:   Instruct learner(s) on prescribed precautions during self-care and functional transfers.              Learning Progress Summary           Patient Acceptance, E,TB, VU by DL at 11/24/2022 0154    Eager, E,TB,D, DU,NR by JW at 11/23/2022 1322    Acceptance, E,TB, VU by DL at 11/23/2022 0029    Acceptance, E,TB, VU by DL at 11/22/2022 0442    Acceptance, E,TB, VU by DL at 11/22/2022 0429    Acceptance, E, VU,NR by HB at 11/18/2022 1211    Acceptance, E,TB,D, VU,DU,NR by LA at 11/17/2022 1423    Acceptance, E,TB, VU by DL at 11/17/2022 0233    Acceptance, E, VU,NR by HB at 11/16/2022 1425    Acceptance, E,TB, VU by DL at 11/15/2022 2033    Acceptance, E, VU,NR by HB at 11/15/2022 1242    Acceptance, E,TB, VU by DL at 11/15/2022 0305    Acceptance, E,TB,D, VU,DU,NR by LA at 11/11/2022 1248    Acceptance, E, VU,NR by HB at 11/9/2022 1230    Acceptance, E,TB, VU by DG at 11/9/2022 0050    Acceptance, E, VU,NR by HB at 11/8/2022 1426    Acceptance, E,TB, VU by DG at 11/8/2022 0114    Acceptance, E, VU,NR by HB at 11/7/2022 1155    Acceptance, E,TB, VU by DG at 11/6/2022 2023    Acceptance, E, VU,NR by HB at 11/4/2022 1153    Acceptance, E, VU,NR by HB at 11/3/2022 1428    Acceptance, E,TB, VU by DG at 11/1/2022 2016    Acceptance, E,TB, VU by DG at 10/31/2022 2315    Acceptance, E, VU,NR by BF at 10/31/2022 1429    Acceptance, E,TB, VU by DL at 10/31/2022 0101    Acceptance, E,TB, VU by DL at 10/29/2022 2321    Acceptance, E, VU,NR by HB at 10/29/2022 1137                   Point: Body mechanics (Done)     Description:   Instruct learner(s) on proper positioning and spine alignment during self-care, functional mobility activities and/or exercises.              Learning Progress Summary            Patient Acceptance, E,TB, VU by DL at 11/24/2022 0154    Eager, E,TB,D, DU,NR by JW at 11/23/2022 1322    Acceptance, E,TB, VU by DL at 11/23/2022 0029    Acceptance, E,TB, VU by DL at 11/22/2022 0442    Acceptance, E,TB, VU by DL at 11/22/2022 0429    Acceptance, E, VU,NR by HB at 11/18/2022 1211    Acceptance, E,TB,D, VU,DU,NR by LA at 11/17/2022 1423    Acceptance, E,TB, VU by DL at 11/17/2022 0233    Acceptance, E, VU,NR by HB at 11/16/2022 1425    Acceptance, E,TB, VU by DL at 11/15/2022 2033    Acceptance, E, VU,NR by HB at 11/15/2022 1242    Acceptance, E,TB, VU by DL at 11/15/2022 0305    Acceptance, E,TB,D, VU,DU,NR by LA at 11/11/2022 1248    Acceptance, E, VU,NR by HB at 11/9/2022 1230    Acceptance, E,TB, VU by DG at 11/9/2022 0050    Acceptance, E, VU,NR by HB at 11/8/2022 1426    Acceptance, E,TB, VU by DG at 11/8/2022 0114    Acceptance, E, VU,NR by HB at 11/7/2022 1155    Acceptance, E,TB, VU by DG at 11/6/2022 2023    Acceptance, E, VU,NR by HB at 11/4/2022 1153    Acceptance, E, VU,NR by HB at 11/3/2022 1428    Acceptance, E,TB, VU by DG at 11/1/2022 2016    Acceptance, E,TB, VU by DG at 10/31/2022 2315    Acceptance, E,TB, VU by DL at 10/31/2022 0101    Acceptance, E,TB, VU by DL at 10/29/2022 2321    Acceptance, E, VU,NR by HB at 10/29/2022 1137                               User Key     Initials Effective Dates Name Provider Type Discipline     06/16/21 -  Phyllis Yañez, RN Registered Nurse Nurse    BF 06/16/21 -  Mandi Smith, OT Occupational Therapist OT    JW 06/16/21 -  Renny Kramer OTR Occupational Therapist OT    HB 05/25/21 -  Dorothy Rosas OT Occupational Therapist OT    LA 02/14/22 -  Jaqueline Bautista OT Occupational Therapist OT    DL 03/16/22 -  Hellen Mcdonnell, RN Registered Nurse Nurse                    OT Recommendation and Plan            Daily Progress Summary (OT)  Overall Progress Toward Functional Goals (OT): progressing toward functional goals as  expected  Daily Progress Summary (OT): Patient tolerated OT treatment session foused on functional endurance, ADL, and strengthening tasks well this date with rest as needed. Patient demos increased upright activity tolerance this date, however, continues to demo deficits in functional transfers, ADL performance, and endurance at this time and would continue to benefit from skilled inpatient OT treatment to address deficits and increase safety and independence.  Impairments Still Limiting Function (OT): pain            Time Calculation:      Time Calculation- OT     Row Name 11/26/22 1214             Time Calculation- OT    OT Start Time 0745  -      OT Stop Time 0915  -      OT Time Calculation (min) 90 min  -      Total Timed Code Minutes- OT 90 minute(s)  -            User Key  (r) = Recorded By, (t) = Taken By, (c) = Cosigned By    Initials Name Provider Type    Ashley Mercer, PAYAM Occupational Therapist              Therapy Charges for Today     Code Description Service Date Service Provider Modifiers Qty    00485698732  OT THERAPEUTIC ACT EA 15 MIN 11/26/2022 Ashley Fulton OT GO 3    [id number]  OT THER PROC EA 15 MIN 11/26/2022 Ashley Fulton OT GO 3                   Ashley Fulton OT  11/26/2022

## 2022-11-26 NOTE — PROGRESS NOTES
PROGRESS NOTE         Patient Identification:  Name:  Melchor Hardwick III  Age:  57 y.o.  Sex:  male  :  1965  MRN:  [id number]  Visit Number:  [id number]  Primary Care Provider:  Missy Up APRN         LOS: 29 days       ----------------------------------------------------------------------------------------------------------------------  Subjective       Chief Complaints:    No chief complaint on file.        Interval History:      Patient resting in bed this morning.  Overall feels well today.  Reports improvement in knee pain, swelling, erythema.  WBC 4.20.  CRP improved at 5.45.  CK normal at 26.  Blood cultures from 2022 show no growth thus far.    Review of Systems:    Constitutional: no fever, chills and night sweats.  Generalized fatigue.  Eyes: no eye drainage, itching or redness.  HEENT: no mouth sores, dysphagia or nose bleed.  Respiratory: no for shortness of breath, cough or production of sputum.  Cardiovascular: no chest pain, no palpitations, no orthopnea.  Gastrointestinal: no nausea, vomiting or diarrhea. No abdominal pain, hematemesis or rectal bleeding.  Genitourinary: no dysuria or polyuria.  Hematologic/lymphatic: no lymph node abnormalities, no easy bruising or easy bleeding.  Musculoskeletal: no muscle or joint pain.  Right knee pain, swelling, improving.  Skin: No rash and no itching.  Neurological: no loss of consciousness, no seizure, no headache.  Behavioral/Psych: no depression or suicidal ideation.  Endocrine: no hot flashes.  Immunologic: negative.    ----------------------------------------------------------------------------------------------------------------------      Objective       [facility] Meds:  ascorbic acid, 500 mg, Oral, Daily  aspirin, 81 mg, Oral, Daily  DAPTOmycin, 6 mg/kg (Adjusted), Intravenous, Q24H  Diclofenac Sodium, 4 g, Topical, 4x Daily  docusate sodium, 100 mg, Oral, BID  fludrocortisone, 50 mcg, Oral, Daily  fondaparinux,  2.5 mg, Subcutaneous, Q24H  Insulin Aspart, 0-14 Units, Subcutaneous, TID AC  Insulin Aspart, 4 Units, Subcutaneous, TID AC  insulin detemir, 17 Units, Subcutaneous, Q12H  iron polysaccharides, 150 mg, Oral, Daily  levothyroxine, 25 mcg, Oral, Q AM  lidocaine PF 2%, 10 mL, Injection, Once  midodrine, 2.5 mg, Oral, TID AC  multivitamin with minerals, 1 tablet, Oral, Daily  pantoprazole, 40 mg, Oral, Q AM  polyethylene glycol, 17 g, Oral, Daily  pregabalin, 100 mg, Oral, Q12H      Pharmacy Consult,       ----------------------------------------------------------------------------------------------------------------------    Vital Signs:  Temp:  [97.9 °F (36.6 °C)-98.1 °F (36.7 °C)] 97.9 °F (36.6 °C)  Heart Rate:  [69-76] 69  Resp:  [16] 16  BP: (128-143)/(71-76) 135/76  No data found.  SpO2 Percentage    11/25/22 0754 11/25/22 1900 11/26/22 0742   SpO2: 98% 98% 98%     SpO2:  [98 %] 98 %  on   ;   Device (Oxygen Therapy): room air    Body mass index is 28.34 kg/m².  Wt Readings from Last 3 Encounters:   11/01/22 89.6 kg (197 lb 8.5 oz)   09/23/22 98 kg (216 lb)   06/17/22 115 kg (254 lb)        Intake/Output Summary (Last 24 hours) at 11/26/2022 1330  Last data filed at 11/26/2022 0900  Gross per 24 hour   Intake 1160 ml   Output 2050 ml   Net -890 ml     Diet: Texture: Regular Texture (IDDSI 7); Fluid Consistency: Regular Consistency; Diabetic Diets; Consistent Carbohydrate  ----------------------------------------------------------------------------------------------------------------------      Physical Exam:    Constitutional: Chronically ill-appearing male is resting in bed.   HENT:  Head: Normocephalic and atraumatic.  Mouth:  Moist mucous membranes.    Cardiovascular:  Normal rate, regular rhythm and normal heart sounds with no murmur. No edema.  Pulmonary/Chest:  No respiratory distress, no wheezes, no crackles, with normal breath sounds and good air movement.  Abdominal:  Soft.  Bowel sounds are normal.  No  distension and no tenderness.   Musculoskeletal:  No edema, no tenderness, and no deformity.  No swelling or redness of joints.  Neurological:  Alert and oriented to person, place, and time.  No facial droop.  No slurred speech.   Skin:  Skin is warm and dry.  No rash noted.  No pallor.  Right knee surgical incision scar. Right knee with improving edema, pain, warmth and redness.  Psychiatric:  Normal mood and affect.  Behavior is normal.    ----------------------------------------------------------------------------------------------------------------------  Results from last 7 days   Lab Units 11/26/22 0139 11/20/22  2358   CK TOTAL U/L 26 23           Results from last 7 days   Lab Units 11/26/22  0139 11/24/22  0112 11/23/22  0725   CRP mg/dL 5.45* 6.62* 9.45*   WBC 10*3/mm3 4.20 3.59 3.27*   HEMOGLOBIN g/dL 8.7* 8.9* 10.5*   HEMATOCRIT % 27.3* 29.1* 33.2*   MCV fL 81.7 82.2 82.4   MCHC g/dL 31.9 30.6* 31.6   PLATELETS 10*3/mm3 111* 128* 141     Results from last 7 days   Lab Units 11/26/22 0139 11/24/22  0112 11/23/22  0725   SODIUM mmol/L 133* 135* 133*   POTASSIUM mmol/L 3.9 3.9 3.6   CHLORIDE mmol/L 100 101 97*   CO2 mmol/L 26.2 25.7 25.7   BUN mg/dL 11 10 12   CREATININE mg/dL 0.69* 0.69* 0.68*   CALCIUM mg/dL 8.0* 8.4* 8.8   GLUCOSE mg/dL 227* 97 121*   ALBUMIN g/dL 2.31* 2.34*  --    BILIRUBIN mg/dL 0.3 0.4  --    ALK PHOS U/L 235* 249*  --    AST (SGOT) U/L 30 32  --    ALT (SGPT) U/L 23 22  --    Estimated Creatinine Clearance: 133 mL/min (A) (by C-G formula based on SCr of 0.69 mg/dL (L)).  No results found for: AMMONIA    Glucose   Date/Time Value Ref Range Status   11/26/2022 1134 271 (H) 70 - 130 mg/dL Final     Comment:     Meter: [id number] : [id number] lisa ramirez   11/26/2022 0643 130 70 - 130 mg/dL Final     Comment:     Meter: [id number] : [id number] MARIETTA LAWRENCE   11/25/2022 2038 316 (H) 70 - 130 mg/dL Final     Comment:     Meter: [id number] : [id number] MARIETTA LAWRENCE    11/25/2022 1604 330 (H) 70 - 130 mg/dL Final     Comment:     Meter: [id number] : [id number] lisa ramirez   11/25/2022 1119 276 (H) 70 - 130 mg/dL Final     Comment:     Meter: [id number] : [id number] Christy Grant   11/25/2022 0626 291 (H) 70 - 130 mg/dL Final     Comment:     Meter: [id number] : [id number] uSsi Velez   11/24/2022 1640 368 (H) 70 - 130 mg/dL Final     Comment:     RN Notified Meter: [id number] : [id number] ROSARIO SHEPARD   11/24/2022 1113 220 (H) 70 - 130 mg/dL Final     Comment:     Meter: [id number] : [id number] chey rachna     Lab Results   Component Value Date    HGBA1C 8.80 (H) 09/20/2022     Lab Results   Component Value Date    TSH 2.390 10/10/2022    FREET4 0.87 (L) 10/10/2022       Blood Culture   Date Value Ref Range Status   10/24/2022 No growth at 5 days  Final   10/24/2022 No growth at 5 days  Final     No results found for: URINECX  No results found for: WOUNDCX  No results found for: STOOLCX  No results found for: RESPCX  Pain Management Panel     Pain Management Panel Latest Ref Rng & Units 5/24/2022 5/11/2022    CREATININE UR mg/dL 91.0 -    AMPHETAMINES SCREEN, URINE Negative - Negative    BARBITURATES SCREEN Negative - Negative    BENZODIAZEPINE SCREEN, URINE Negative - Negative    BUPRENORPHINEUR Negative - Negative    COCAINE SCREEN, URINE Negative - Negative    METHADONE SCREEN, URINE Negative - Negative    METHAMPHETAMINEUR Negative - Negative            ----------------------------------------------------------------------------------------------------------------------  Imaging Results (Last 24 Hours)     ** No results found for the last 24 hours. **          ----------------------------------------------------------------------------------------------------------------------    Pertinent Infectious Disease Results    COVID-19 PCR from 10/28/2022 negative.  Blood cultures from 10/18/2022 2 out of 2 sets positive for MRSA.  Blood cultures from  10/20/2022 2 out of 2 sets positive for MRSA.  Blood cultures from 10/22/2022 1 out of 2 sets positive for MRSA.  Blood cultures from 10/24/2022 finalized as no growth. Blood cultures on 11/12/2022 finalized as no growth. 2D echo from 10/24/2022 reports no evidence of vegetation.  Updated TYRESE on 11/1/2022 showed a moderate sized echodense structure on the posterior tricuspid leaflet but not attached to the leaflet.  The appearance and location are not typical for regurgitation.  This structure could be a normal variant.  No evidence of tricuspid valve stenosis or significant regurgitations.  Other valves also appear normal in structure with no evidence of stenosis or significant regurgitations.  No vegetation seen on these valves. Right lower extremity venous duplex negative for DVT.  Bilateral upper extremity venous duplex negative for DVT.     CT right knee without contrast on 11/17/2022 showed a large joint effusion again.  Suprapatellar region collection appears slightly larger now measuring 10.3 x 3.3 cm in axial dimension and was 8.8 x 2.8 cm.  The collection again shows heterogenous mixed density.  Increasing lytic lucency at the lateral tibial plateau could represent osteomyelitis.  Blood cultures on 11/12/2022 finalized as no growth.    CPK on 11/20/2022 was normal at 23.  Urinalysis on 11/21/2022 was negative.  Chest x-ray on 11/21/2022 showed redemonstrated elevation of the right hemidiaphragm with mild right basilar atelectasis/infiltrate.    2 out of 2 blood cultures on 11/20/2022 are so far showing MRSA.  1 out of 2 blood cultures on 11/22/2022 is so far showing MRSA.    Assessment/Plan       Assessment     Right knee septic arthritis  MRSA bacteremia  Possible endocarditis    Plan      Patient resting in bed this morning.  Overall feels well today.  Reports improvement in knee pain, swelling, erythema.  WBC 4.20.  CRP improved at 5.45.  CK normal at 26.  Blood cultures from 11/23/2022 show no growth  thus far.    Surgeon plans for washout next Wednesday, 11/30/2022.     I believe MRSA bacteremia seen on 11/20/2022 and 11/22/2022 is related to worsening right knee infection and patient is awaiting intraoperative washout with cultures at this time.  Patient completed course of ceftaroline.  We will continue daptomycin monotherapy for now.     Code Status:   Code Status and Medical Interventions:   Ordered at: 11/03/22 1015     Level Of Support Discussed With:    Patient     Code Status (Patient has no pulse and is not breathing):    CPR (Attempt to Resuscitate)     Medical Interventions (Patient has pulse or is breathing):    Full Support     Release to patient:    Routine Release       Sera Arevalo, ROSALINA  11/26/22  13:30 EST

## 2022-11-26 NOTE — PROGRESS NOTES
Assisted By: Occupational therapy he was also seen ambulating with physical therapy    CC: Follow-up on septic joint and bacteremia    Interview History/HPI: Patient states he is feeling better, no fever or chills, his knee pain has gotten significantly better, he continues to feel like the warmth is going away as is the effusion.  No new complaints, tolerating his diet.          Current Hospital Meds:  ascorbic acid, 500 mg, Oral, Daily  aspirin, 81 mg, Oral, Daily  DAPTOmycin, 6 mg/kg (Adjusted), Intravenous, Q24H  Diclofenac Sodium, 4 g, Topical, 4x Daily  docusate sodium, 100 mg, Oral, BID  fludrocortisone, 50 mcg, Oral, Daily  fondaparinux, 2.5 mg, Subcutaneous, Q24H  Insulin Aspart, 0-14 Units, Subcutaneous, TID AC  insulin detemir, 17 Units, Subcutaneous, Q12H  iron polysaccharides, 150 mg, Oral, Daily  levothyroxine, 25 mcg, Oral, Q AM  lidocaine PF 2%, 10 mL, Injection, Once  midodrine, 5 mg, Oral, TID AC  multivitamin with minerals, 1 tablet, Oral, Daily  pantoprazole, 40 mg, Oral, Q AM  polyethylene glycol, 17 g, Oral, Daily  pregabalin, 100 mg, Oral, Q12H    Pharmacy Consult,         Vitals:    11/26/22 0742   BP: 135/76   Pulse: 69   Resp: 16   Temp: 97.9 °F (36.6 °C)   SpO2: 98%         Intake/Output Summary (Last 24 hours) at 11/26/2022 1041  Last data filed at 11/26/2022 0900  Gross per 24 hour   Intake 1520 ml   Output 2450 ml   Net -930 ml       EXAM: He is pleasant, no distress, lungs have bilateral breath sounds are clear no rhonchi rales wheezing, heart regular rate and rhythm without murmur rub or gallop.  His knee incision vertically across his patella well-healed no drainage nontender I do not feel effusion today, no erythema, minimal warmth compared to the other leg but significant proved over earlier in the week.      Diet: Texture: Regular Texture (IDDSI 7); Fluid Consistency: Regular Consistency; Diabetic Diets; Consistent Carbohydrate        LABS:     Lab Results (last 48 hours)      Procedure Component Value Units Date/Time    Blood Culture - Blood, Arm, Left [[id number]]  (Abnormal) Collected: 11/20/22 2350    Specimen: Blood from Arm, Left Updated: 11/26/22 0822     Blood Culture Staphylococcus aureus, MRSA     Comment:   Infectious disease consultation is highly recommended to rule out distant foci of infection.  Methicillin resistant Staphylococcus aureus, Patient may be an isolation risk.        Isolated from Pediatric Bottle     Gram Stain Pediatric Bottle Gram positive cocci in clusters    Narrative:        Refer to previous blood culture collected on 11/20/2022 at 2355 for MICs.    Blood Culture - Blood, Arm, Right [[id number]]  (Abnormal) Collected: 11/22/22 0700    Specimen: Blood from Arm, Right Updated: 11/26/22 0822     Blood Culture Staphylococcus aureus, MRSA     Comment:   Infectious disease consultation is highly recommended to rule out distant foci of infection.  Methicillin resistant Staphylococcus aureus, Patient may be an isolation risk.        Isolated from Pediatric Bottle     Gram Stain Pediatric Bottle Gram positive cocci in clusters    Narrative:      Refer to previous blood culture collected on 11/21 for peña's    Blood Culture - Blood, Arm, Left [[id number]]  (Abnormal) Collected: 11/22/22 0700    Specimen: Blood from Arm, Left Updated: 11/26/22 0821     Blood Culture Staphylococcus aureus, MRSA     Comment:   Infectious disease consultation is highly recommended to rule out distant foci of infection.  Methicillin resistant Staphylococcus aureus, Patient may be an isolation risk.        Isolated from Aerobic and Anaerobic Bottles     Gram Stain Aerobic Bottle Gram positive cocci in clusters      Anaerobic Bottle Gram positive cocci in clusters    Narrative:      Refer to previous blood culture collected on 11/21    Blood Culture - Blood, Hand, Left [[id number]]  (Abnormal)  (Susceptibility) Collected: 11/20/22 2355    Specimen: Blood from Hand, Left Updated: 11/26/22 0821      Blood Culture Staphylococcus aureus, MRSA     Comment:   Infectious disease consultation is highly recommended to rule out distant foci of infection.  Methicillin resistant Staphylococcus aureus, Patient may be an isolation risk.        Isolated from Pediatric Bottle     Gram Stain Pediatric Bottle Gram positive cocci in clusters    Susceptibility      Staphylococcus aureus, MRSA      STU      Gentamicin Susceptible      Oxacillin Resistant     Rifampin Susceptible      Vancomycin Susceptible  [1]                    [1]  With a vancomycin STU 2 mcg/mL, susceptibility may not correlate with clinical outcomes. Reassess if no clinical improvement is observed and consult with ID for additional clinical guidance.             Susceptibility Comments     Staphylococcus aureus, MRSA    This isolate does not demonstrate inducible clindamycin resistance in vitro.               POC Glucose Once [[id number]]  (Normal) Collected: 11/26/22 0643    Specimen: Blood Updated: 11/26/22 0649     Glucose 130 mg/dL      Comment: Meter: [id number] : [id number] MARIETTA LAWRENCE       Comprehensive Metabolic Panel [[id number]]  (Abnormal) Collected: 11/26/22 0139    Specimen: Blood Updated: 11/26/22 0232     Glucose 227 mg/dL      BUN 11 mg/dL      Creatinine 0.69 mg/dL      Sodium 133 mmol/L      Potassium 3.9 mmol/L      Chloride 100 mmol/L      CO2 26.2 mmol/L      Calcium 8.0 mg/dL      Total Protein 5.6 g/dL      Albumin 2.31 g/dL      ALT (SGPT) 23 U/L      AST (SGOT) 30 U/L      Alkaline Phosphatase 235 U/L      Total Bilirubin 0.3 mg/dL      Globulin 3.3 gm/dL      A/G Ratio 0.7 g/dL      BUN/Creatinine Ratio 15.9     Anion Gap 6.8 mmol/L      eGFR 107.9 mL/min/1.73      Comment: National Kidney Foundation and American Society of Nephrology (ASN) Task Force recommended calculation based on the Chronic Kidney Disease Epidemiology Collaboration (CKD-EPI) equation refit without adjustment for race.       Narrative:      GFR Normal  >60  Chronic Kidney Disease <60  Kidney Failure <15      CK [[id number]]  (Normal) Collected: 11/26/22 0139    Specimen: Blood Updated: 11/26/22 0232     Creatine Kinase 26 U/L     C-reactive Protein [[id number]]  (Abnormal) Collected: 11/26/22 0139    Specimen: Blood Updated: 11/26/22 0232     C-Reactive Protein 5.45 mg/dL     CBC & Differential [[id number]]  (Abnormal) Collected: 11/26/22 0139    Specimen: Blood Updated: 11/26/22 0210    Narrative:      The following orders were created for panel order CBC & Differential.  Procedure                               Abnormality         Status                     ---------                               -----------         ------                     CBC Auto Differential[[id number]]        Abnormal            Final result                 Please view results for these tests on the individual orders.    CBC Auto Differential [[id number]]  (Abnormal) Collected: 11/26/22 0139    Specimen: Blood Updated: 11/26/22 0210     WBC 4.20 10*3/mm3      RBC 3.34 10*6/mm3      Hemoglobin 8.7 g/dL      Hematocrit 27.3 %      MCV 81.7 fL      MCH 26.0 pg      MCHC 31.9 g/dL      RDW 16.5 %      RDW-SD 49.6 fl      MPV 9.5 fL      Platelets 111 10*3/mm3      Neutrophil % 71.1 %      Lymphocyte % 19.0 %      Monocyte % 7.1 %      Eosinophil % 2.1 %      Basophil % 0.2 %      Immature Grans % 0.5 %      Neutrophils, Absolute 2.98 10*3/mm3      Lymphocytes, Absolute 0.80 10*3/mm3      Monocytes, Absolute 0.30 10*3/mm3      Eosinophils, Absolute 0.09 10*3/mm3      Basophils, Absolute 0.01 10*3/mm3      Immature Grans, Absolute 0.02 10*3/mm3      nRBC 0.0 /100 WBC     POC Glucose Once [[id number]]  (Abnormal) Collected: 11/25/22 2038    Specimen: Blood Updated: 11/25/22 2044     Glucose 316 mg/dL      Comment: Meter: [id number] : [id number] MARIETTA MELINDA       POC Glucose Once [[id number]]  (Abnormal) Collected: 11/25/22 1604    Specimen: Blood Updated: 11/25/22 1616     Glucose 330 mg/dL       Comment: Meter: [id number] : [id number] lisa ramirez       POC Glucose Once [[id number]]  (Abnormal) Collected: 11/25/22 1119    Specimen: Blood Updated: 11/25/22 1132     Glucose 276 mg/dL      Comment: Meter: [id number] : [id number] Christy Grant       Blood Culture - Blood, Arm, Right [[id number]]  (Normal) Collected: 11/23/22 1054    Specimen: Blood from Arm, Right Updated: 11/25/22 1115     Blood Culture No growth at 2 days    Blood Culture - Blood, Hand, Right [[id number]]  (Normal) Collected: 11/23/22 1054    Specimen: Blood from Hand, Right Updated: 11/25/22 1115     Blood Culture No growth at 2 days    POC Glucose Once [[id number]]  (Abnormal) Collected: 11/25/22 0626    Specimen: Blood Updated: 11/25/22 0635     Glucose 291 mg/dL      Comment: Meter: [id number] : [id number] Susi Velez       POC Glucose Once [[id number]]  (Abnormal) Collected: 11/24/22 1640    Specimen: Blood Updated: 11/24/22 1647     Glucose 368 mg/dL      Comment: RN Notified Meter: [id number] : [id number] ROSARIO SHEPARD       POC Glucose Once [[id number]]  (Abnormal) Collected: 11/24/22 1113    Specimen: Blood Updated: 11/24/22 1120     Glucose 220 mg/dL      Comment: Meter: [id number] : [id number] chey briscoe                  Radiology:    Imaging Results (Last 72 Hours)     ** No results found for the last 72 hours. **          Results for orders placed during the hospital encounter of 10/28/22    Adult Transesophageal Echo (TYRESE) W/ Cont if Necessary Per Protocol    Interpretation Summary  •  Indication for TYRESE -to rule out infective endocarditis in a patient with MRSA bacteremia.  •  Findings-  •  Left ventricular systolic function is normal. Estimated left ventricular EF = 60%  •  Left atrial appendage is well visualized and is free of thrombus.  •  Saline test was negative for the evidence of shunt in interatrial septum.  •  The tricuspid valve appeared normal in structure. There was a moderate sized echodense  structure seen above  the posterior tricuspid leaflet but not attached to the leaflet. The appearance and location are not typical for regurgitation. This structure could be a normal variant. No evidence of tricuspid valve stenosis or significant regurgitations.  •  Other valves also appear normal in structure with no evidence of stenosis or significant regurgitations.  No vegetations seen on these valves.  •  There is no evidence of pericardial effusion.  •  Comment-  •  In view of presence of MRSA bacteremia, recommend to extend antibiotic therapy for possible infective endocarditis and repeat TYRESE in 3 months.      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  Patient continues to improve with therapy.  Yesterday with physical therapy, he was minimal assist for sit stand transfer and stand sit.  He walked 25 feet with standby assist/supervision with a front wheel walker.  He worked on therapeutic exercises of joints, motor skills and balance.  He is progressing well towards goals.  With his septic joint in his right knee earlier in the week this had hindered his therapy but after addition of Teflaro and joint aspiration this has improved.  He is set up assistance for upper body dressing, minimal assistance to mod with lower body dressing.  Patient's most recent blood culture is not growing, he remains on daptomycin, he is completed his course of Teflaro as prescribed by infectious disease.  Patient is planning to go Wednesday for a spacer to be placed in his right knee by orthopedic surgery in [place].  His CPK is normal a daptomycin, CRP continues to come down White count is normal.  Platelets are essentially stable.  They had risen due to acute inflammatory response but I think are now resolving.    Diabetes, not well controlled this morning glucose was 130 it tends to rise through the day, I have added some  scheduled NovoLog with a sliding scale and will follow.    DVT prophylaxis, Arixtra, stable    Orthostasis, does not seem to be a problem the 5 mg a day changed yesterday, will continue to wean and monitor.  No complaints of dizziness.  He continues on Florinef.  This was most likely orthostasis because of his prolonged bedridden state.    History of cirrhosis related to [place]    Diabetic peripheral neuropathy, stable on Lyrica    Moderate risk, adjusted at least 2 prescription medications.    Ja Bowers MD

## 2022-11-26 NOTE — PROGRESS NOTES
Inpatient Rehabilitation Functional Measures Assessment    Functional Measures  SHANNON Eating:  SHANNON Grooming:  SHANNON Bathing:  SHANNON Upper Body Dressing:  SHANNON Lower Body Dressing:  SHANNON Toileting:    SHANNON Bladder Management  Level of Assistance:  Frequency/Number of Accidents this Shift:    SHANNON Bowel Management  Level of Assistance:  Frequency/Number of Accidents this Shift:    SHANNON Bed/Chair/Wheelchair Transfer:  SHANNON Toilet Transfer:  SHANNON Tub/Shower Transfer:    Previously Documented Mode of Locomotion at Discharge:  [facility] Expected Mode of Locomotion at Discharge:  [facility] Walk/Wheelchair:  [facility] Stairs:    [facility] Comprehension:  [facility] Expression:  [facility] Social Interaction:  [facility] Problem Solving:  SHANNON Memory:    Therapy Mode Minutes  Occupational Therapy: Individual: 90 minutes.  Physical Therapy:  Speech Language Pathology:    Discharge Functional Goals:    Signed by: Kristine Morrell, Occupational Therapist

## 2022-11-26 NOTE — PLAN OF CARE
Goal Outcome Evaluation:  Plan of Care Reviewed With: patient        Progress: improving  Outcome Evaluation: PROGRESSING WITH CURRENT THERAPIES; CONTINUE PLAN OF CARE; MONITOR

## 2022-11-27 LAB
GLUCOSE BLDC GLUCOMTR-MCNC: 169 MG/DL (ref 70–130)
GLUCOSE BLDC GLUCOMTR-MCNC: 182 MG/DL (ref 70–130)
GLUCOSE BLDC GLUCOMTR-MCNC: 189 MG/DL (ref 70–130)
GLUCOSE BLDC GLUCOMTR-MCNC: 231 MG/DL (ref 70–130)
GLUCOSE BLDC GLUCOMTR-MCNC: 90 MG/DL (ref 70–130)

## 2022-11-27 PROCEDURE — 99308 SBSQ NF CARE LOW MDM 20: CPT | Performed by: NURSE PRACTITIONER

## 2022-11-27 PROCEDURE — 82962 GLUCOSE BLOOD TEST: CPT

## 2022-11-27 PROCEDURE — 63710000001 INSULIN DETEMIR PER 5 UNITS: Performed by: INTERNAL MEDICINE

## 2022-11-27 PROCEDURE — 25010000002 FONDAPARINUX PER 0.5 MG: Performed by: FAMILY MEDICINE

## 2022-11-27 PROCEDURE — 25010000002 DAPTOMYCIN PER 1 MG: Performed by: FAMILY MEDICINE

## 2022-11-27 PROCEDURE — 63710000001 INSULIN ASPART PER 5 UNITS: Performed by: INTERNAL MEDICINE

## 2022-11-27 RX ADMIN — INSULIN ASPART 4 UNITS: 100 INJECTION, SOLUTION INTRAVENOUS; SUBCUTANEOUS at 17:57

## 2022-11-27 RX ADMIN — DOCUSATE SODIUM 100 MG: 100 CAPSULE ORAL at 21:52

## 2022-11-27 RX ADMIN — INSULIN ASPART 4 UNITS: 100 INJECTION, SOLUTION INTRAVENOUS; SUBCUTANEOUS at 12:47

## 2022-11-27 RX ADMIN — DICLOFENAC 4 G: 10 GEL TOPICAL at 17:59

## 2022-11-27 RX ADMIN — PREGABALIN 100 MG: 100 CAPSULE ORAL at 21:52

## 2022-11-27 RX ADMIN — INSULIN DETEMIR 17 UNITS: 100 INJECTION, SOLUTION SUBCUTANEOUS at 09:31

## 2022-11-27 RX ADMIN — DICLOFENAC 4 G: 10 GEL TOPICAL at 09:15

## 2022-11-27 RX ADMIN — FONDAPARINUX SODIUM 2.5 MG: 2.5 INJECTION, SOLUTION SUBCUTANEOUS at 09:16

## 2022-11-27 RX ADMIN — Medication 150 MG: at 09:13

## 2022-11-27 RX ADMIN — Medication 1 TABLET: at 09:13

## 2022-11-27 RX ADMIN — INSULIN ASPART 4 UNITS: 100 INJECTION, SOLUTION INTRAVENOUS; SUBCUTANEOUS at 09:31

## 2022-11-27 RX ADMIN — ASPIRIN 81 MG: 81 TABLET, COATED ORAL at 09:13

## 2022-11-27 RX ADMIN — CARBIDOPA AND LEVODOPA 2.5 MG: 50; 200 TABLET, EXTENDED RELEASE ORAL at 12:47

## 2022-11-27 RX ADMIN — PANTOPRAZOLE SODIUM 40 MG: 40 TABLET, DELAYED RELEASE ORAL at 06:15

## 2022-11-27 RX ADMIN — CARBIDOPA AND LEVODOPA 2.5 MG: 50; 200 TABLET, EXTENDED RELEASE ORAL at 06:34

## 2022-11-27 RX ADMIN — INSULIN ASPART 3 UNITS: 100 INJECTION, SOLUTION INTRAVENOUS; SUBCUTANEOUS at 12:48

## 2022-11-27 RX ADMIN — FLUDROCORTISONE ACETATE 50 MCG: 0.1 TABLET ORAL at 09:13

## 2022-11-27 RX ADMIN — DICLOFENAC 4 G: 10 GEL TOPICAL at 21:52

## 2022-11-27 RX ADMIN — OXYCODONE HYDROCHLORIDE AND ACETAMINOPHEN 500 MG: 500 TABLET ORAL at 09:13

## 2022-11-27 RX ADMIN — LEVOTHYROXINE SODIUM 25 MCG: 0.07 TABLET ORAL at 06:15

## 2022-11-27 RX ADMIN — INSULIN DETEMIR 17 UNITS: 100 INJECTION, SOLUTION SUBCUTANEOUS at 21:52

## 2022-11-27 RX ADMIN — PREGABALIN 100 MG: 100 CAPSULE ORAL at 09:31

## 2022-11-27 RX ADMIN — DAPTOMYCIN 500 MG: 500 INJECTION, POWDER, LYOPHILIZED, FOR SOLUTION INTRAVENOUS at 18:34

## 2022-11-27 RX ADMIN — CARBIDOPA AND LEVODOPA 2.5 MG: 50; 200 TABLET, EXTENDED RELEASE ORAL at 17:58

## 2022-11-27 RX ADMIN — INSULIN ASPART 3 UNITS: 100 INJECTION, SOLUTION INTRAVENOUS; SUBCUTANEOUS at 17:57

## 2022-11-27 NOTE — PROGRESS NOTES
Patient is without complaints, doing well, no significant knee pain, no fever overnight, 11/23 blood cultures remain negative now.  Patient has completed Teflaro course per ID recommendations and now is on daptomycin alone, CPK is negative.  Clinically has improved, knee looks better, no fever and blood cultures have remained negative.  He is going up Wednesday for surgery to get a spacer placed.  Glucoses noted still somewhat wide fluctuations no hypoglycemia however, continue to follow.

## 2022-11-27 NOTE — PROGRESS NOTES
PROGRESS NOTE         Patient Identification:  Name:  Melchor Hardwick III  Age:  57 y.o.  Sex:  male  :  1965  MRN:  [id number]  Visit Number:  [id number]  Primary Care Provider:  Missy Up APRN         LOS: 30 days       ----------------------------------------------------------------------------------------------------------------------  Subjective       Chief Complaints:    No chief complaint on file.        Interval History:      Patient resting in bed this morning.  No issues reported overnight.  Afebrile, denies diarrhea.  Currently on room air with no apparent distress.  Minimal knee discomfort.    Review of Systems:    Constitutional: no fever, chills and night sweats.  Generalized fatigue.  Eyes: no eye drainage, itching or redness.  HEENT: no mouth sores, dysphagia or nose bleed.  Respiratory: no for shortness of breath, cough or production of sputum.  Cardiovascular: no chest pain, no palpitations, no orthopnea.  Gastrointestinal: no nausea, vomiting or diarrhea. No abdominal pain, hematemesis or rectal bleeding.  Genitourinary: no dysuria or polyuria.  Hematologic/lymphatic: no lymph node abnormalities, no easy bruising or easy bleeding.  Musculoskeletal: no muscle or joint pain.  Right knee pain, swelling, improving.  Skin: No rash and no itching.  Neurological: no loss of consciousness, no seizure, no headache.  Behavioral/Psych: no depression or suicidal ideation.  Endocrine: no hot flashes.  Immunologic: negative.    ----------------------------------------------------------------------------------------------------------------------      Objective       Current Hospital Meds:  ascorbic acid, 500 mg, Oral, Daily  aspirin, 81 mg, Oral, Daily  DAPTOmycin, 6 mg/kg (Adjusted), Intravenous, Q24H  Diclofenac Sodium, 4 g, Topical, 4x Daily  docusate sodium, 100 mg, Oral, BID  fludrocortisone, 50 mcg, Oral, Daily  fondaparinux, 2.5 mg, Subcutaneous, Q24H  Insulin Aspart, 0-14 Units,  Subcutaneous, TID AC  Insulin Aspart, 4 Units, Subcutaneous, TID AC  insulin detemir, 17 Units, Subcutaneous, Q12H  iron polysaccharides, 150 mg, Oral, Daily  levothyroxine, 25 mcg, Oral, Q AM  lidocaine PF 2%, 10 mL, Injection, Once  midodrine, 2.5 mg, Oral, TID AC  multivitamin with minerals, 1 tablet, Oral, Daily  pantoprazole, 40 mg, Oral, Q AM  polyethylene glycol, 17 g, Oral, Daily  pregabalin, 100 mg, Oral, Q12H      Pharmacy Consult,       ----------------------------------------------------------------------------------------------------------------------    Vital Signs:  Temp:  [98 °F (36.7 °C)-98.6 °F (37 °C)] 98 °F (36.7 °C)  Heart Rate:  [73-80] 80  Resp:  [16-18] 16  BP: (132-134)/(70-74) 132/70  No data found.  SpO2 Percentage    11/26/22 0742 11/26/22 1900 11/27/22 0910   SpO2: 98% 96% 96%     SpO2:  [96 %] 96 %  on   ;   Device (Oxygen Therapy): room air    Body mass index is 28.34 kg/m².  Wt Readings from Last 3 Encounters:   11/01/22 89.6 kg (197 lb 8.5 oz)   09/23/22 98 kg (216 lb)   06/17/22 115 kg (254 lb)        Intake/Output Summary (Last 24 hours) at 11/27/2022 1132  Last data filed at 11/27/2022 0900  Gross per 24 hour   Intake 1440 ml   Output 3475 ml   Net -2035 ml     Diet: Texture: Regular Texture (IDDSI 7); Fluid Consistency: Regular Consistency; Diabetic Diets; Consistent Carbohydrate  ----------------------------------------------------------------------------------------------------------------------      Physical Exam:    Constitutional: Chronically ill-appearing male is resting in bed.   HENT:  Head: Normocephalic and atraumatic.  Mouth:  Moist mucous membranes.    Cardiovascular:  Normal rate, regular rhythm and normal heart sounds with no murmur. No edema.  Pulmonary/Chest:  No respiratory distress, no wheezes, no crackles, with normal breath sounds and good air movement.  Abdominal:  Soft.  Bowel sounds are normal.  No distension and no tenderness.   Musculoskeletal:  No edema,  no tenderness, and no deformity.  No swelling or redness of joints.  Neurological:  Alert and oriented to person, place, and time.  No facial droop.  No slurred speech.   Skin:  Skin is warm and dry.  No rash noted.  No pallor.  Right knee surgical incision scar. Right knee with improving edema, pain, warmth and redness.  Psychiatric:  Normal mood and affect.  Behavior is normal.    ----------------------------------------------------------------------------------------------------------------------  Results from last 7 days   Lab Units 11/26/22 0139 11/20/22 2358   CK TOTAL U/L 26 23           Results from last 7 days   Lab Units 11/26/22 0139 11/24/22  0112 11/23/22  0725   CRP mg/dL 5.45* 6.62* 9.45*   WBC 10*3/mm3 4.20 3.59 3.27*   HEMOGLOBIN g/dL 8.7* 8.9* 10.5*   HEMATOCRIT % 27.3* 29.1* 33.2*   MCV fL 81.7 82.2 82.4   MCHC g/dL 31.9 30.6* 31.6   PLATELETS 10*3/mm3 111* 128* 141     Results from last 7 days   Lab Units 11/26/22 0139 11/24/22  0112 11/23/22  0725   SODIUM mmol/L 133* 135* 133*   POTASSIUM mmol/L 3.9 3.9 3.6   CHLORIDE mmol/L 100 101 97*   CO2 mmol/L 26.2 25.7 25.7   BUN mg/dL 11 10 12   CREATININE mg/dL 0.69* 0.69* 0.68*   CALCIUM mg/dL 8.0* 8.4* 8.8   GLUCOSE mg/dL 227* 97 121*   ALBUMIN g/dL 2.31* 2.34*  --    BILIRUBIN mg/dL 0.3 0.4  --    ALK PHOS U/L 235* 249*  --    AST (SGOT) U/L 30 32  --    ALT (SGPT) U/L 23 22  --    Estimated Creatinine Clearance: 133 mL/min (A) (by C-G formula based on SCr of 0.69 mg/dL (L)).  No results found for: AMMONIA    Glucose   Date/Time Value Ref Range Status   11/27/2022 1122 169 (H) 70 - 130 mg/dL Final     Comment:     Meter: [id number] : [id number] mark torres   11/27/2022 0925 182 (H) 70 - 130 mg/dL Final     Comment:     Meter: [id number] : [id number] Steve Yancey   11/27/2022 0614 90 70 - 130 mg/dL Final     Comment:     Meter: [id number] : [id number] James Jaja   11/26/2022 1951 223 (H) 70 - 130 mg/dL Final     Comment:      Meter: [id number] : [id number] YAMILET GAMA   11/26/2022 1634 218 (H) 70 - 130 mg/dL Final     Comment:     Meter: [id number] : [id number] lisa ramirez   11/26/2022 1134 271 (H) 70 - 130 mg/dL Final     Comment:     Meter: [id number] : [id number] lisa ramirez   11/26/2022 0643 130 70 - 130 mg/dL Final     Comment:     Meter: [id number] : [id number] MARIETTA LAWRENCE   11/25/2022 2038 316 (H) 70 - 130 mg/dL Final     Comment:     Meter: [id number] : [id number] MARIETTA LAWRENCE     Lab Results   Component Value Date    HGBA1C 8.80 (H) 09/20/2022     Lab Results   Component Value Date    TSH 2.390 10/10/2022    FREET4 0.87 (L) 10/10/2022       Blood Culture   Date Value Ref Range Status   10/24/2022 No growth at 5 days  Final   10/24/2022 No growth at 5 days  Final     No results found for: URINECX  No results found for: WOUNDCX  No results found for: STOOLCX  No results found for: RESPCX  Pain Management Panel     Pain Management Panel Latest Ref Rng & Units 5/24/2022 5/11/2022    CREATININE UR mg/dL 91.0 -    AMPHETAMINES SCREEN, URINE Negative - Negative    BARBITURATES SCREEN Negative - Negative    BENZODIAZEPINE SCREEN, URINE Negative - Negative    BUPRENORPHINEUR Negative - Negative    COCAINE SCREEN, URINE Negative - Negative    METHADONE SCREEN, URINE Negative - Negative    METHAMPHETAMINEUR Negative - Negative            ----------------------------------------------------------------------------------------------------------------------  Imaging Results (Last 24 Hours)     ** No results found for the last 24 hours. **          ----------------------------------------------------------------------------------------------------------------------    Pertinent Infectious Disease Results    COVID-19 PCR from 10/28/2022 negative.  Blood cultures from 10/18/2022 2 out of 2 sets positive for MRSA.  Blood cultures from 10/20/2022 2 out of 2 sets positive for MRSA.  Blood cultures from  10/22/2022 1 out of 2 sets positive for MRSA.  Blood cultures from 10/24/2022 finalized as no growth. Blood cultures on 11/12/2022 finalized as no growth. 2D echo from 10/24/2022 reports no evidence of vegetation.  Updated TYRESE on 11/1/2022 showed a moderate sized echodense structure on the posterior tricuspid leaflet but not attached to the leaflet.  The appearance and location are not typical for regurgitation.  This structure could be a normal variant.  No evidence of tricuspid valve stenosis or significant regurgitations.  Other valves also appear normal in structure with no evidence of stenosis or significant regurgitations.  No vegetation seen on these valves. Right lower extremity venous duplex negative for DVT.  Bilateral upper extremity venous duplex negative for DVT.     CT right knee without contrast on 11/17/2022 showed a large joint effusion again.  Suprapatellar region collection appears slightly larger now measuring 10.3 x 3.3 cm in axial dimension and was 8.8 x 2.8 cm.  The collection again shows heterogenous mixed density.  Increasing lytic lucency at the lateral tibial plateau could represent osteomyelitis.  Blood cultures on 11/12/2022 finalized as no growth.    CPK on 11/20/2022 was normal at 23.  Urinalysis on 11/21/2022 was negative.  Chest x-ray on 11/21/2022 showed redemonstrated elevation of the right hemidiaphragm with mild right basilar atelectasis/infiltrate.    2 out of 2 blood cultures on 11/20/2022 are so far showing MRSA.  1 out of 2 blood cultures on 11/22/2022 is so far showing MRSA.    Assessment/Plan       Assessment     Right knee septic arthritis  MRSA bacteremia  Possible endocarditis    Plan        Patient resting in bed this morning.  No issues reported overnight.  Afebrile, denies diarrhea.  Currently on room air with no apparent distress.  Minimal knee discomfort.    Blood cultures from 11/23/2022 show no growth thus far.    Surgeon plans for washout next Wednesday,  11/30/2022.     I believe MRSA bacteremia seen on 11/20/2022 and 11/22/2022 is related to worsening right knee infection and patient is awaiting intraoperative washout with cultures at this time.  Patient completed course of ceftaroline.  We will continue daptomycin monotherapy for now.     Code Status:   Code Status and Medical Interventions:   Ordered at: 11/03/22 1015     Level Of Support Discussed With:    Patient     Code Status (Patient has no pulse and is not breathing):    CPR (Attempt to Resuscitate)     Medical Interventions (Patient has pulse or is breathing):    Full Support     Release to patient:    Routine Release       Sera Arevalo, APRN  11/27/22  11:32 EST

## 2022-11-27 NOTE — PLAN OF CARE
Goal Outcome Evaluation:   Progressing medically and physically with all therapies.  Continue with current POC.

## 2022-11-28 ENCOUNTER — PRE-ADMISSION TESTING (OUTPATIENT)
Dept: PREADMISSION TESTING | Facility: HOSPITAL | Age: 57
End: 2022-11-28

## 2022-11-28 VITALS — HEIGHT: 70 IN | WEIGHT: 196 LBS | BODY MASS INDEX: 28.06 KG/M2

## 2022-11-28 LAB
25(OH)D3 SERPL-MCNC: 35.8 NG/ML (ref 30–100)
ALBUMIN SERPL-MCNC: 3 G/DL (ref 3.5–5.2)
ALBUMIN/GLOB SERPL: 0.8 G/DL
ALP SERPL-CCNC: 294 U/L (ref 39–117)
ALT SERPL W P-5'-P-CCNC: 27 U/L (ref 1–41)
ANION GAP SERPL CALCULATED.3IONS-SCNC: 13 MMOL/L (ref 5–15)
APTT PPP: 34.6 SECONDS (ref 22–39)
AST SERPL-CCNC: 46 U/L (ref 1–40)
BACTERIA SPEC AEROBE CULT: NORMAL
BACTERIA SPEC AEROBE CULT: NORMAL
BASOPHILS # BLD AUTO: 0.02 10*3/MM3 (ref 0–0.2)
BASOPHILS NFR BLD AUTO: 0.3 % (ref 0–1.5)
BILIRUB SERPL-MCNC: 0.4 MG/DL (ref 0–1.2)
BUN SERPL-MCNC: 11 MG/DL (ref 6–20)
BUN/CREAT SERPL: 17.5 (ref 7–25)
CALCIUM SPEC-SCNC: 8.5 MG/DL (ref 8.6–10.5)
CHLORIDE SERPL-SCNC: 97 MMOL/L (ref 98–107)
CO2 SERPL-SCNC: 23 MMOL/L (ref 22–29)
CREAT SERPL-MCNC: 0.63 MG/DL (ref 0.76–1.27)
CRP SERPL-MCNC: 3.52 MG/DL (ref 0–0.5)
DEPRECATED RDW RBC AUTO: 50.5 FL (ref 37–54)
EGFRCR SERPLBLD CKD-EPI 2021: 110.9 ML/MIN/1.73
EOSINOPHIL # BLD AUTO: 0.08 10*3/MM3 (ref 0–0.4)
EOSINOPHIL NFR BLD AUTO: 1.4 % (ref 0.3–6.2)
ERYTHROCYTE [DISTWIDTH] IN BLOOD BY AUTOMATED COUNT: 16.6 % (ref 12.3–15.4)
ERYTHROCYTE [SEDIMENTATION RATE] IN BLOOD: 79 MM/HR (ref 0–20)
GLOBULIN UR ELPH-MCNC: 4 GM/DL
GLUCOSE BLDC GLUCOMTR-MCNC: 173 MG/DL (ref 70–130)
GLUCOSE BLDC GLUCOMTR-MCNC: 264 MG/DL (ref 70–130)
GLUCOSE BLDC GLUCOMTR-MCNC: 279 MG/DL (ref 70–130)
GLUCOSE SERPL-MCNC: 286 MG/DL (ref 65–99)
HBA1C MFR BLD: 7.3 % (ref 4.8–5.6)
HCT VFR BLD AUTO: 36 % (ref 37.5–51)
HGB BLD-MCNC: 11.1 G/DL (ref 13–17.7)
IMM GRANULOCYTES # BLD AUTO: 0.02 10*3/MM3 (ref 0–0.05)
IMM GRANULOCYTES NFR BLD AUTO: 0.3 % (ref 0–0.5)
INR PPP: 1.12 (ref 0.84–1.13)
LYMPHOCYTES # BLD AUTO: 0.99 10*3/MM3 (ref 0.7–3.1)
LYMPHOCYTES NFR BLD AUTO: 16.8 % (ref 19.6–45.3)
MCH RBC QN AUTO: 25.5 PG (ref 26.6–33)
MCHC RBC AUTO-ENTMCNC: 30.8 G/DL (ref 31.5–35.7)
MCV RBC AUTO: 82.8 FL (ref 79–97)
MONOCYTES # BLD AUTO: 0.4 10*3/MM3 (ref 0.1–0.9)
MONOCYTES NFR BLD AUTO: 6.8 % (ref 5–12)
MRSA DNA SPEC QL NAA+PROBE: NEGATIVE
NEUTROPHILS NFR BLD AUTO: 4.39 10*3/MM3 (ref 1.7–7)
NEUTROPHILS NFR BLD AUTO: 74.4 % (ref 42.7–76)
NRBC BLD AUTO-RTO: 0 /100 WBC (ref 0–0.2)
PLATELET # BLD AUTO: 144 10*3/MM3 (ref 140–450)
PMV BLD AUTO: 10.2 FL (ref 6–12)
POTASSIUM SERPL-SCNC: 3.9 MMOL/L (ref 3.5–5.2)
PROT SERPL-MCNC: 7 G/DL (ref 6–8.5)
PROTHROMBIN TIME: 14.3 SECONDS (ref 11.4–14.4)
RBC # BLD AUTO: 4.35 10*6/MM3 (ref 4.14–5.8)
SODIUM SERPL-SCNC: 133 MMOL/L (ref 136–145)
WBC NRBC COR # BLD: 5.9 10*3/MM3 (ref 3.4–10.8)

## 2022-11-28 PROCEDURE — 85730 THROMBOPLASTIN TIME PARTIAL: CPT

## 2022-11-28 PROCEDURE — 63710000001 INSULIN ASPART PER 5 UNITS: Performed by: INTERNAL MEDICINE

## 2022-11-28 PROCEDURE — 63710000001 INSULIN DETEMIR PER 5 UNITS: Performed by: INTERNAL MEDICINE

## 2022-11-28 PROCEDURE — 97116 GAIT TRAINING THERAPY: CPT

## 2022-11-28 PROCEDURE — 25010000002 DAPTOMYCIN PER 1 MG: Performed by: FAMILY MEDICINE

## 2022-11-28 PROCEDURE — G0480 DRUG TEST DEF 1-7 CLASSES: HCPCS

## 2022-11-28 PROCEDURE — 82962 GLUCOSE BLOOD TEST: CPT

## 2022-11-28 PROCEDURE — 36415 COLL VENOUS BLD VENIPUNCTURE: CPT

## 2022-11-28 PROCEDURE — 97110 THERAPEUTIC EXERCISES: CPT

## 2022-11-28 PROCEDURE — 85610 PROTHROMBIN TIME: CPT

## 2022-11-28 PROCEDURE — 85652 RBC SED RATE AUTOMATED: CPT

## 2022-11-28 PROCEDURE — 85025 COMPLETE CBC W/AUTO DIFF WBC: CPT

## 2022-11-28 PROCEDURE — 82306 VITAMIN D 25 HYDROXY: CPT

## 2022-11-28 PROCEDURE — 97530 THERAPEUTIC ACTIVITIES: CPT

## 2022-11-28 PROCEDURE — 82985 ASSAY OF GLYCATED PROTEIN: CPT

## 2022-11-28 PROCEDURE — 87641 MR-STAPH DNA AMP PROBE: CPT

## 2022-11-28 PROCEDURE — 80053 COMPREHEN METABOLIC PANEL: CPT

## 2022-11-28 PROCEDURE — 86140 C-REACTIVE PROTEIN: CPT

## 2022-11-28 PROCEDURE — 99231 SBSQ HOSP IP/OBS SF/LOW 25: CPT | Performed by: FAMILY MEDICINE

## 2022-11-28 PROCEDURE — 99308 SBSQ NF CARE LOW MDM 20: CPT | Performed by: INTERNAL MEDICINE

## 2022-11-28 PROCEDURE — 83036 HEMOGLOBIN GLYCOSYLATED A1C: CPT

## 2022-11-28 RX ORDER — MIDODRINE HYDROCHLORIDE 2.5 MG/1
2.5 TABLET ORAL
Status: ON HOLD | COMMUNITY
End: 2022-12-15 | Stop reason: SDUPTHER

## 2022-11-28 RX ADMIN — INSULIN ASPART 3 UNITS: 100 INJECTION, SOLUTION INTRAVENOUS; SUBCUTANEOUS at 08:39

## 2022-11-28 RX ADMIN — OXYCODONE HYDROCHLORIDE AND ACETAMINOPHEN 500 MG: 500 TABLET ORAL at 08:38

## 2022-11-28 RX ADMIN — INSULIN DETEMIR 17 UNITS: 100 INJECTION, SOLUTION SUBCUTANEOUS at 08:41

## 2022-11-28 RX ADMIN — PANTOPRAZOLE SODIUM 40 MG: 40 TABLET, DELAYED RELEASE ORAL at 06:24

## 2022-11-28 RX ADMIN — DAPTOMYCIN 500 MG: 500 INJECTION, POWDER, LYOPHILIZED, FOR SOLUTION INTRAVENOUS at 18:38

## 2022-11-28 RX ADMIN — CARBIDOPA AND LEVODOPA 2.5 MG: 50; 200 TABLET, EXTENDED RELEASE ORAL at 17:48

## 2022-11-28 RX ADMIN — PREGABALIN 100 MG: 100 CAPSULE ORAL at 08:39

## 2022-11-28 RX ADMIN — INSULIN DETEMIR 17 UNITS: 100 INJECTION, SOLUTION SUBCUTANEOUS at 21:23

## 2022-11-28 RX ADMIN — LEVOTHYROXINE SODIUM 25 MCG: 0.07 TABLET ORAL at 06:24

## 2022-11-28 RX ADMIN — INSULIN ASPART 4 UNITS: 100 INJECTION, SOLUTION INTRAVENOUS; SUBCUTANEOUS at 17:49

## 2022-11-28 RX ADMIN — CYCLOBENZAPRINE 5 MG: 10 TABLET, FILM COATED ORAL at 09:35

## 2022-11-28 RX ADMIN — Medication 150 MG: at 08:41

## 2022-11-28 RX ADMIN — INSULIN ASPART 8 UNITS: 100 INJECTION, SOLUTION INTRAVENOUS; SUBCUTANEOUS at 17:48

## 2022-11-28 RX ADMIN — DICLOFENAC 4 G: 10 GEL TOPICAL at 08:41

## 2022-11-28 RX ADMIN — Medication 1 TABLET: at 08:38

## 2022-11-28 RX ADMIN — CARBIDOPA AND LEVODOPA 2.5 MG: 50; 200 TABLET, EXTENDED RELEASE ORAL at 06:24

## 2022-11-28 RX ADMIN — FLUDROCORTISONE ACETATE 50 MCG: 0.1 TABLET ORAL at 08:38

## 2022-11-28 RX ADMIN — HYDROCODONE BITARTRATE AND ACETAMINOPHEN 1 TABLET: 5; 325 TABLET ORAL at 09:36

## 2022-11-28 RX ADMIN — PREGABALIN 100 MG: 100 CAPSULE ORAL at 21:22

## 2022-11-28 RX ADMIN — INSULIN ASPART 4 UNITS: 100 INJECTION, SOLUTION INTRAVENOUS; SUBCUTANEOUS at 08:39

## 2022-11-28 RX ADMIN — CYCLOBENZAPRINE 5 MG: 10 TABLET, FILM COATED ORAL at 21:22

## 2022-11-28 NOTE — PLAN OF CARE
Goal Outcome Evaluation:  Plan of Care Reviewed With: patient     Outcome Evaluation: Pt resting in bed. Midline dressing changed. VSS. No s/s of acute distress noted. Will continue w/plan of care.

## 2022-11-28 NOTE — THERAPY TREATMENT NOTE
Inpatient Rehabilitation - Occupational Therapy Treatment Note    YVONNE Gaines     Patient Name: Melchor Hardwick III  : 1965  MRN: [id number]    Today's Date: 2022                 Admit Date: 10/28/2022         ICD-10-CM ICD-9-CM   1. Staphylococcal arthritis of right knee (HCC)  M00.061 711.06     041.10       Patient Active Problem List   Diagnosis   • Hyperlipidemia   • Hypertensive disorder   • Obesity   • Type 2 diabetes mellitus with hyperglycemia, with long-term current use of insulin (HCC)   • Gas gangrene of foot (HCC)   • Cellulitis in diabetic foot (HCC)   • Other acute osteomyelitis of left foot (HCC)   • Osteomyelitis (HCC)   • Critical illness myopathy   • Staphylococcal arthritis of right knee (HCC)   • Other cirrhosis of liver (HCC)   • MRSA bacteremia   • Endocarditis of tricuspid valve   • Wound of right buttock   • History of osteomyelitis   • Debility       Past Medical History:   Diagnosis Date   • Diabetes mellitus (HCC)     4 times per day gets checked for sugar at rehab   • Dizzy    • Hyperlipidemia    • Hypertension    • MRSA infection     blood -      • Orthostatic hypotension    • Pressure sore on buttocks     top crack -  sees wound - but now rn take care of it at rehab - minor opening - dime size - pat nurse didnt assess-  pt states getting better   • Pyogenic arthritis of right knee joint, due to unspecified organism (HCC) 2022   • Sepsis (HCC)    • Wears glasses     readers       Past Surgical History:   Procedure Laterality Date   • ABSCESS DRAINAGE  2004    PERINEUM   • AMPUTATION FOOT Left 2022    Procedure: AMPUTATION FOOT;  Surgeon: Addison Colby MD;  Location: [facility];  Service: Podiatry;  Laterality: Left;   • INCISION AND DRAINAGE LEG Left 05/10/2022    Procedure: INCISION AND DRAINAGE LOWER EXTREMITY;  Surgeon: Addison Colby MD;  Location: [facility];  Service: Podiatry;  Laterality: Left;   • KNEE INCISION AND DRAINAGE Right 2022     Procedure: KNEE INCISION AND DRAINAGE RIGHT;  Surgeon: Brain Bentley MD;  Location:  [facility];  Service: Orthopedics;  Laterality: Right;   • PERIPHERALLY INSERTED CENTRAL CATHETER INSERTION Left    • WOUND DEBRIDEMENT Left 05/13/2022    Procedure: DEBRIDEMENT FOOT;  Surgeon: Addison Colby MD;  Location: [facility] OR;  Service: Podiatry;  Laterality: Left;             IRF OT ASSESSMENT FLOWSHEET (last 12 hours)     IRF OT Evaluation and Treatment     Row Name 11/28/22 1400          OT Time and Intention    Document Type daily treatment  -     Mode of Treatment individual therapy;occupational therapy  -     Patient Effort good  -     Row Name 11/28/22 1400          General Information    Patient/Family/Caregiver Comments/Observations patient agreeable to therapy  -     Existing Precautions/Restrictions fall  -     Row Name 11/28/22 1400          Cognition/Psychosocial    Affect/Mental Status (Cognition) WFL  -     Row Name 11/28/22 1400          Upper Body Dressing    [place] Level (Upper Body Dressing) set up assistance  -     Row Name 11/28/22 1400          Toileting    [place] Level (Toileting) minimum assist (75% patient effort);contact guard assist  -     Assistive Device Use (Toileting) raised toilet seat;grab bar/safety frame  -     Row Name 11/28/22 1400          Bed-Chair Transfer    Bed-Chair [place] (Transfers) contact guard;minimum assist (75% patient effort);verbal cues  -     Row Name 11/28/22 1400          Toilet Transfer    Type (Toilet Transfer) stand pivot/stand step  -     [place] Level (Toilet Transfer) minimum assist (75% patient effort);verbal cues  -     Row Name 11/28/22 1400          Motor Skills    Motor Skills coordination;functional endurance  -     Therapeutic Exercise --  BUE ROM, UE PRE, gmc,fmc,[place], tegan ex, flex bar  -     Row Name 11/28/22 1400          Positioning and Restraints    Post Treatment Position chair  -     In  Chair sitting;with PT  -           User Key  (r) = Recorded By, (t) = Taken By, (c) = Cosigned By    Initials Name Effective Dates    Jazmin Jorge, OT 06/16/21 -                  Occupational Therapy Education     Title: PT OT SLP Therapies (Done)     Topic: Occupational Therapy (Done)     Point: ADL training (Done)     Description:   Instruct learner(s) on proper safety adaptation and remediation techniques during self care or transfers.   Instruct in proper use of assistive devices.              Learning Progress Summary           Patient Acceptance, E,TB, VU by DL at 11/24/2022 0154    Eager, E,TB,D, DU,NR by JW at 11/23/2022 1322    Acceptance, E,TB, VU by DL at 11/23/2022 0029    Acceptance, E,TB, VU by DL at 11/22/2022 0442    Acceptance, E,TB, VU by DL at 11/22/2022 0429    Acceptance, E, VU,NR by HB at 11/18/2022 1211    Acceptance, E,TB,D, VU,DU,NR by LA at 11/17/2022 1423    Acceptance, E,TB, VU by DL at 11/17/2022 0233    Acceptance, E, VU,NR by HB at 11/16/2022 1425    Acceptance, E,TB, VU by DL at 11/15/2022 2033    Acceptance, E, VU,NR by HB at 11/15/2022 1242    Acceptance, E,TB, VU by DL at 11/15/2022 0305    Acceptance, E,TB,D, VU,DU,NR by LA at 11/11/2022 1248    Acceptance, E, VU,NR by BF at 11/10/2022 1431    Acceptance, E, VU,NR by HB at 11/9/2022 1230    Acceptance, E,TB, VU by DG at 11/9/2022 0050    Acceptance, E, VU,NR by HB at 11/8/2022 1426    Acceptance, E,TB, VU by DG at 11/8/2022 0114    Acceptance, E, VU,NR by HB at 11/7/2022 1155    Acceptance, E,TB, VU by DG at 11/6/2022 2023    Acceptance, E, VU,NR by HB at 11/4/2022 1153    Acceptance, E, VU,NR by HB at 11/3/2022 1428    Acceptance, E,TB, VU by DG at 11/1/2022 2016    Acceptance, E,TB, VU by DG at 10/31/2022 2315    Acceptance, E, VU,NR by BF at 10/31/2022 1429    Acceptance, E,TB, VU by DL at 10/31/2022 0101    Acceptance, E,TB, VU by DL at 10/29/2022 2321    Acceptance, E, VU,NR by HB at 10/29/2022 1137                    Point: Home exercise program (Done)     Description:   Instruct learner(s) on appropriate technique for monitoring, assisting and/or progressing therapeutic exercises/activities.              Learning Progress Summary           Patient Acceptance, E,TB, VU by DL at 11/24/2022 0154    Eager, E,TB,D, DU,NR by JW at 11/23/2022 1322    Acceptance, E,TB, VU by DL at 11/23/2022 0029    Acceptance, E,TB, VU by DL at 11/22/2022 0442    Acceptance, E,TB, VU by DL at 11/22/2022 0429    Acceptance, E, VU,NR by HB at 11/18/2022 1211    Acceptance, E,TB,D, VU,DU,NR by LA at 11/17/2022 1423    Acceptance, E,TB, VU by DL at 11/17/2022 0233    Acceptance, E, VU,NR by HB at 11/16/2022 1425    Acceptance, E,TB, VU by DL at 11/15/2022 2033    Acceptance, E, VU,NR by HB at 11/15/2022 1242    Acceptance, E,TB, VU by DL at 11/15/2022 0305    Acceptance, E,TB,D, VU,DU,NR by LA at 11/11/2022 1248    Acceptance, E, VU,NR by BF at 11/10/2022 1431    Acceptance, E, VU,NR by HB at 11/9/2022 1230    Acceptance, E,TB, VU by DG at 11/9/2022 0050    Acceptance, E, VU,NR by HB at 11/8/2022 1426    Acceptance, E,TB, VU by DG at 11/8/2022 0114    Acceptance, E, VU,NR by HB at 11/7/2022 1155    Acceptance, E,TB, VU by DG at 11/6/2022 2023    Acceptance, E, VU,NR by HB at 11/4/2022 1153    Acceptance, E, VU,NR by HB at 11/3/2022 1428    Acceptance, E,TB, VU by DG at 11/1/2022 2016    Acceptance, E,TB, VU by DG at 10/31/2022 2315    Acceptance, E,TB, VU by DL at 10/31/2022 0101    Acceptance, E,TB, VU by DL at 10/29/2022 2321    Acceptance, E, VU,NR by  at 10/29/2022 1137                   Point: Precautions (Done)     Description:   Instruct learner(s) on prescribed precautions during self-care and functional transfers.              Learning Progress Summary           Patient Acceptance, E,TB, VU by DL at 11/24/2022 0154    Eager, E,TB,D, DU,NR by JW at 11/23/2022 1322    Acceptance, E,TB, VU by DL at 11/23/2022 0029    Acceptance, E,TB, VU by  DL at 11/22/2022 0442    Acceptance, E,TB, VU by DL at 11/22/2022 0429    Acceptance, E, VU,NR by HB at 11/18/2022 1211    Acceptance, E,TB,D, VU,DU,NR by LA at 11/17/2022 1423    Acceptance, E,TB, VU by DL at 11/17/2022 0233    Acceptance, E, VU,NR by HB at 11/16/2022 1425    Acceptance, E,TB, VU by DL at 11/15/2022 2033    Acceptance, E, VU,NR by HB at 11/15/2022 1242    Acceptance, E,TB, VU by DL at 11/15/2022 0305    Acceptance, E,TB,D, VU,DU,NR by LA at 11/11/2022 1248    Acceptance, E, VU,NR by HB at 11/9/2022 1230    Acceptance, E,TB, VU by DG at 11/9/2022 0050    Acceptance, E, VU,NR by HB at 11/8/2022 1426    Acceptance, E,TB, VU by DG at 11/8/2022 0114    Acceptance, E, VU,NR by HB at 11/7/2022 1155    Acceptance, E,TB, VU by DG at 11/6/2022 2023    Acceptance, E, VU,NR by HB at 11/4/2022 1153    Acceptance, E, VU,NR by HB at 11/3/2022 1428    Acceptance, E,TB, VU by DG at 11/1/2022 2016    Acceptance, E,TB, VU by DG at 10/31/2022 2315    Acceptance, E, VU,NR by BF at 10/31/2022 1429    Acceptance, E,TB, VU by DL at 10/31/2022 0101    Acceptance, E,TB, VU by DL at 10/29/2022 2321    Acceptance, E, VU,NR by HB at 10/29/2022 1137                   Point: Body mechanics (Done)     Description:   Instruct learner(s) on proper positioning and spine alignment during self-care, functional mobility activities and/or exercises.              Learning Progress Summary           Patient Acceptance, E,TB, VU by DL at 11/24/2022 0154    Eager, E,TB,D, DU,NR by JW at 11/23/2022 1322    Acceptance, E,TB, VU by DL at 11/23/2022 0029    Acceptance, E,TB, VU by DL at 11/22/2022 0442    Acceptance, E,TB, VU by DL at 11/22/2022 0429    Acceptance, E, VU,NR by HB at 11/18/2022 1211    Acceptance, E,TB,D, VU,DU,NR by LA at 11/17/2022 1423    Acceptance, E,TB, VU by DL at 11/17/2022 0233    Acceptance, E, VU,NR by HB at 11/16/2022 1425    Acceptance, E,TB, VU by DL at 11/15/2022 2033    Acceptance, E, VU,NR by HB at 11/15/2022  1242    Acceptance, E,TB, VU by DL at 11/15/2022 0305    Acceptance, E,TB,D, VU,DU,NR by LA at 11/11/2022 1248    Acceptance, E, VU,NR by HB at 11/9/2022 1230    Acceptance, E,TB, VU by DG at 11/9/2022 0050    Acceptance, E, VU,NR by HB at 11/8/2022 1426    Acceptance, E,TB, VU by DG at 11/8/2022 0114    Acceptance, E, VU,NR by HB at 11/7/2022 1155    Acceptance, E,TB, VU by DG at 11/6/2022 2023    Acceptance, E, VU,NR by HB at 11/4/2022 1153    Acceptance, E, VU,NR by HB at 11/3/2022 1428    Acceptance, E,TB, VU by DG at 11/1/2022 2016    Acceptance, E,TB, VU by DG at 10/31/2022 2315    Acceptance, E,TB, VU by DL at 10/31/2022 0101    Acceptance, E,TB, VU by DL at 10/29/2022 2321    Acceptance, E, VU,NR by HB at 10/29/2022 1137                               User Key     Initials Effective Dates Name Provider Type Discipline     06/16/21 -  Phyllis Yañez, RN Registered Nurse Nurse     06/16/21 -  Mandi Smith OT Occupational Therapist OT    JW 06/16/21 -  Renny Kramer OTR Occupational Therapist OT    HB 05/25/21 -  Dorothy Rosas OT Occupational Therapist OT    LA 02/14/22 -  Jaqueline Bautista OT Occupational Therapist OT    DL 03/16/22 -  Hellen Mcdonnell RN Registered Nurse Nurse                    OT Recommendation and Plan                         Time Calculation:      Time Calculation- OT     Row Name 11/28/22 1433             Time Calculation-     OT Start Time 0745  -      OT Stop Time 0915  -      OT Time Calculation (min) 90 min  -            User Key  (r) = Recorded By, (t) = Taken By, (c) = Cosigned By    Initials Name Provider Type     Jazmin Goldman, OT Occupational Therapist                           aJzmin Goldman, OT  11/28/2022

## 2022-11-28 NOTE — PAYOR COMM NOTE
"Jaja Ramirez, RN  Utilization Review Nurse for Inpatient Rehab  Phone: [phone]  Fax: [phone]  Email: [email or web address]  [facility] NPI: [id number]    CLINICAL REVIEW FOR CONTINUED REHAB STAY APPROVAL  Member: Melchor Hardwick  : 1965  Ref# [id number]  ID# [id number]  Admission Date: 10/28/22  Planning for Discharge on 22  *Requesting additional 2 days    Melchor Hardwick III (57 y.o. Male)     Date of Birth   1965    Social Security Number       Address   [address] [address]    Home Phone   [phone]    MRN   [id number]       Gnosticist   [facility]    Marital Status   Single                            Admission Date   10/28/22    Admission Type   Elective    Admitting Provider   Ja Bowers MD    Attending Provider   Ja Bowers MD    Department, Room/Bed   [facility] REHABILITATION, 109/2S       Discharge Date       Discharge Disposition       Discharge Destination                               Attending Provider: Ja Bowers MD    Allergies: No Known Allergies    Isolation: None   Infection: MRSA (22), MRSA No Isolation this Admit (22)   Code Status: CPR    Ht: 177.8 cm (70\")   Wt: 89.6 kg (197 lb 8.5 oz)    Admission Cmt: None   Principal Problem: Debility [R53.81]               Corrina Mendez MSW     Case Management  Significant Note     Signed  Date of Service:  22  Creation Time:  22     Signed                         22   Plan   Plan SS spoke to pt and sister Ros about pt to have surgery at [facility] on his right knee on 22; time has not been determined yet.  Sister says she has to transport pt to [facility] on 22 at 1:00 pm for labs/EKG for pre-op then he will have surgery on 22; time will be given on 22.  Sister will transport pt to this appointment on 22 and will come to rehab around 10:00 am and she will " bring pt back to rehab.  Will follow.   Patient/Family in Agreement with Plan yes                 Joe Mike MD   Physician  Medicine  Progress Notes     Signed  Date of Service:  22  Creation Time:  22     Signed        Expand All Collapse [facility]  PROGRESS NOTE     Patient Identification:  Name:  Melchor Hardwick III  Age:  57 y.o.  Sex:  male  :  1965  MRN:  [id number]  Visit Number:  [id number]  ROOM: [facility]      Primary Care Provider:  Missy Up APRN     Length of stay in inpatient status:  31     Subjective      Chief Compliant:  No chief complaint on file.        History of Presenting Illness: 57-year-old gentleman with history of right septic knee arthritis, MRSA bacteremia, likely endocarditis.  Patient states he is doing well at this time is scheduled to go up this week for a antibiotic spacer placement involving the right knee.  Patient has no new complaints at this time     Objective      Current Hospital Meds:ascorbic acid, 500 mg, Oral, Daily  DAPTOmycin, 6 mg/kg (Adjusted), Intravenous, Q24H  Diclofenac Sodium, 4 g, Topical, 4x Daily  docusate sodium, 100 mg, Oral, BID  fludrocortisone, 50 mcg, Oral, Daily  Insulin Aspart, 0-14 Units, Subcutaneous, TID AC  Insulin Aspart, 4 Units, Subcutaneous, TID AC  insulin detemir, 17 Units, Subcutaneous, Q12H  iron polysaccharides, 150 mg, Oral, Daily  levothyroxine, 25 mcg, Oral, Q AM  lidocaine PF 2%, 10 mL, Injection, Once  midodrine, 2.5 mg, Oral, TID AC  multivitamin with minerals, 1 tablet, Oral, Daily  pantoprazole, 40 mg, Oral, Q AM  polyethylene glycol, 17 g, Oral, Daily  pregabalin, 100 mg, Oral, Q12H     Pharmacy Consult,         ----------------------------------------------------------------------------------------------------------------------  Vital Signs:  Temp:  [98.1 °F (36.7 °C)] 98.1 °F (36.7 °C)  Heart Rate:  [73-79] 73  Resp:  [18] 18  BP: (113-144)/(64-80) 130/77  SpO2:  [96 %] 96 %   on   ;   Device (Oxygen Therapy): room air  Body mass index is 28.34 kg/m².         Wt Readings from Last 3 Encounters:   11/01/22 89.6 kg (197 lb 8.5 oz)   09/23/22 98 kg (216 lb)   06/17/22 115 kg (254 lb)               Intake & Output (last 3 days)        11/25 0701  11/26 0700 11/26 0701  11/27 0700 11/27 0701 11/28 0700 11/28 0701 11/29 0700     P.O. 1440 1440 1200 240     I.V. (mL/kg) 200 (2.2)           Total Intake(mL/kg) 1640 (18.3) 1440 (16.1) 1200 (13.4) 240 (2.7)     Urine (mL/kg/hr) 2450 (1.1) 3875 (1.8) 3300 (1.5)       Stool 0 0 0       Total Output 2450 3875 3300       Net -911 -3236 -9948 +240                   Urine Unmeasured Occurrence   2 x         Stool Unmeasured Occurrence 1 x 1 x 1 x            Diet: Texture: Regular Texture (IDDSI 7); Fluid Consistency: Regular Consistency; Diabetic Diets; Consistent Carbohydrate  ----------------------------------------------------------------------------------------------------------------------  Physical exam:  Constitutional:   No acute distress  HEENT: Normocephalic atraumatic  Neck:    Supple  Cardiovascular: Regular rate and rhythm  Pulmonary/Chest: Clear to auscultation  Abdominal: Positive bowel sounds soft.   Musculoskeletal: Right knee moderate swelling noted  Neurological: No focal deficits  Skin: No rash  Peripheral vascular:  Genitourinary:  ----------------------------------------------------------------------------------------------------------------------     Last echocardiogram:  Results for orders placed during the hospital encounter of 10/28/22     Adult Transesophageal Echo (TYRESE) W/ Cont if Necessary Per Protocol     Interpretation Summary  •  Indication for TYRESE -to rule out infective endocarditis in a patient with MRSA bacteremia.  •  Findings-  •  Left ventricular systolic function is normal. Estimated left ventricular EF = 60%  •  Left atrial appendage is well visualized and is free of thrombus.  •  Saline test was negative for  the evidence of shunt in interatrial septum.  •  The tricuspid valve appeared normal in structure. There was a moderate sized echodense structure seen above  the posterior tricuspid leaflet but not attached to the leaflet. The appearance and location are not typical for regurgitation. This structure could be a normal variant. No evidence of tricuspid valve stenosis or significant regurgitations.  •  Other valves also appear normal in structure with no evidence of stenosis or significant regurgitations.  No vegetations seen on these valves.  •  There is no evidence of pericardial effusion.  •  Comment-  •  In view of presence of MRSA bacteremia, recommend to extend antibiotic therapy for possible infective endocarditis and repeat TYRESE in 3 months.     ----------------------------------------------------------------------------------------------------------------------         Results from last 7 days   Lab Units 11/26/22  0139 11/24/22  0112 11/23/22  0725   CRP mg/dL 5.45* 6.62* 9.45*   WBC 10*3/mm3 4.20 3.59 3.27*   HEMOGLOBIN g/dL 8.7* 8.9* 10.5*   HEMATOCRIT % 27.3* 29.1* 33.2*   MCV fL 81.7 82.2 82.4   MCHC g/dL 31.9 30.6* 31.6   PLATELETS 10*3/mm3 111* 128* 141                 Results from last 7 days   Lab Units 11/26/22  0139 11/24/22  0112 11/23/22  0725   SODIUM mmol/L 133* 135* 133*   POTASSIUM mmol/L 3.9 3.9 3.6   CHLORIDE mmol/L 100 101 97*   CO2 mmol/L 26.2 25.7 25.7   BUN mg/dL 11 10 12   CREATININE mg/dL 0.69* 0.69* 0.68*   CALCIUM mg/dL 8.0* 8.4* 8.8   GLUCOSE mg/dL 227* 97 121*   ALBUMIN g/dL 2.31* 2.34*  --    BILIRUBIN mg/dL 0.3 0.4  --    ALK PHOS U/L 235* 249*  --    AST (SGOT) U/L 30 32  --    ALT (SGPT) U/L 23 22  --    Estimated Creatinine Clearance: 133 mL/min (A) (by C-G formula based on SCr of 0.69 mg/dL (L)).  No results found for: AMMONIA       Results from last 7 days   Lab Units 11/26/22  0139   CK TOTAL U/L 26                      Glucose   Date/Time Value Ref Range Status   11/28/2022  0628 173 (H) 70 - 130 mg/dL Final       Comment:       Meter: [id number] : [id number] YAMILET GAMA   11/27/2022 2014 231 (H) 70 - 130 mg/dL Final       Comment:       Meter: [id number] : [id number] YAMILET GAMA   11/27/2022 1702 189 (H) 70 - 130 mg/dL Final       Comment:       Meter: [id number] : [id number] mark massengill   11/27/2022 1122 169 (H) 70 - 130 mg/dL Final       Comment:       Meter: [id number] : [id number] mark massengill   11/27/2022 0925 182 (H) 70 - 130 mg/dL Final       Comment:       Meter: [id number] : [id number] Steve Yancey   11/27/2022 0614 90 70 - 130 mg/dL Final       Comment:       Meter: [id number] : [id number] James Wilkinson   11/26/2022 1951 223 (H) 70 - 130 mg/dL Final       Comment:       Meter: [id number] : [id number] YAMILET GAMA   11/26/2022 1634 218 (H) 70 - 130 mg/dL Final       Comment:       Meter: [id number] : [id number] lisa ramirez            Lab Results   Component Value Date     TSH 2.390 10/10/2022     FREET4 0.87 (L) 10/10/2022      No results found for: PREGTESTUR, PREGSERUM, HCG, HCGQUANT          Pain Management Panel         Pain Management Panel Latest Ref Rng & Units 5/24/2022 5/11/2022     CREATININE UR mg/dL 91.0 -     AMPHETAMINES SCREEN, URINE Negative - Negative     BARBITURATES SCREEN Negative - Negative     BENZODIAZEPINE SCREEN, URINE Negative - Negative     BUPRENORPHINEUR Negative - Negative     COCAINE SCREEN, URINE Negative - Negative     METHADONE SCREEN, URINE Negative - Negative     METHAMPHETAMINEUR Negative - Negative                                   Brief Urine Lab Results  (Last result in the past 365 days)       Color   Clarity   Blood   Leuk Est   Nitrite   Protein   CREAT   Urine HCG         11/21/22 0341 Yellow    Clear    Negative    Negative    Negative    Negative                               Blood Culture   Date Value Ref Range Status   11/23/2022 No growth at 4 days   Preliminary    11/23/2022 No growth at 4 days   Preliminary   11/22/2022 Staphylococcus aureus, MRSA (C)   Final       Comment:          Infectious disease consultation is highly recommended to rule out distant foci of infection.  Methicillin resistant Staphylococcus aureus, Patient may be an isolation risk.   11/22/2022 Staphylococcus aureus, MRSA (C)   Final       Comment:          Infectious disease consultation is highly recommended to rule out distant foci of infection.  Methicillin resistant Staphylococcus aureus, Patient may be an isolation risk.          No results found for: URINECX  No results found for: WOUNDCX  No results found for: STOOLCX         Results from last 7 days   Lab Units 11/26/22  0139 11/24/22  0112 11/23/22  0725   CRP mg/dL 5.45* 6.62* 9.45*         I have personally looked at the labs and they are summarized above.  ----------------------------------------------------------------------------------------------------------------------  Detailed radiology reports for the last 24 hours:         Imaging Results (Last 24 Hours)      ** No results found for the last 24 hours. **          Final impressions for the last 30 days of radiology reports:     Adult Transesophageal Echo (TYRESE) W/ Cont if Necessary Per Protocol     Addendum Date: 11/4/2022    •  Indication for TYRESE -to rule out infective endocarditis in a patient with MRSA bacteremia. •  Findings- •  Left ventricular systolic function is normal. Estimated left ventricular EF = 60% •  Left atrial appendage is well visualized and is free of thrombus. •  Saline test was negative for the evidence of shunt in interatrial septum. •  The tricuspid valve appeared normal in structure. There was a moderate sized echodense structure seen above  the posterior tricuspid leaflet but not attached to the leaflet. The appearance and location are not typical for regurgitation. This structure could be a normal variant. No evidence of tricuspid valve stenosis or  significant regurgitations. •  Other valves also appear normal in structure with no evidence of stenosis or significant regurgitations.  No vegetations seen on these valves. •  There is no evidence of pericardial effusion. •  Comment- •  In view of presence of MRSA bacteremia, recommend to extend antibiotic therapy for possible infective endocarditis and repeat TYRESE in 3 months.      Addendum Date: 11/3/2022    •  Indication for TYRESE -to rule out infective endocarditis in a patient with MRSA bacteremia. •  Findings- •  Left ventricular systolic function is normal. Estimated left ventricular EF = 60% •  Left atrial appendage is well visualized and is free of thrombus. •  Saline test was negative for the evidence of shunt in interatrial septum. •  The tricuspid valve appeared normal in structure. There was a moderate sized echodense structure seen above  the posterior tricuspid leaflet but not attached to the leaflet. The appearance and location are not typical for regurgitation. This structure could be a normal variant. No evidence of tricuspid valve stenosis or significant regurgitations. •  Other valves also appear normal in structure with no evidence of stenosis or significant regurgitations.  No vegetations seen on these valves. •  There is no evidence of pericardial effusion. •  Comment- •  In view of presence of MRSA bacteremia, recommend to extend antibiotic therapy for infective endocarditis and repeat TYRESE in 3 months.      Addendum Date: 11/3/2022    •  Indication for TYRESE -to rule out infective endocarditis in a patient with MRSA bacteremia. •  Findings- •  Left ventricular systolic function is normal. Estimated left ventricular EF = 60% •  Left atrial appendage is well visualized and is free of thrombus. •  Saline test was negative for the evidence of shunt in interatrial septum. •  The tricuspid valve appeared normal in structure. There was a moderate sized echodense structure seen above  the posterior  tricuspid leaflet but not attached to the leaflet. The appearance and location are not typical for regurgitation. This structure could be a normal variant. No evidence of tricuspid valve stenosis or significant regurgitations. •  Other valves also appear normal in structure with no evidence of stenosis or significant regurgitations.  No vegetations seen on these valves. •  There is no evidence of pericardial effusion.      Addendum Date: 11/3/2022    •  Indication for TYRESE -to rule out infective endocarditis in a patient with MRSA bacteremia. •  Findings- •  Left ventricular systolic function is normal. Estimated left ventricular EF = 60% •  Left atrial appendage is well visualized and is free of thrombus. •  Saline test was negative for the evidence of shunt in interatrial septum. •  The tricuspid valve appeared normal in structure. There was a moderate sized echodense structure seen in both the posterior tricuspid leaflet but not attached to the leaflet. The appearance and location are not typical for regurgitation. This structure could be a normal variant. No evidence of tricuspid valve stenosis or significant regurgitations. •  Other valves also appear normal in structure with no evidence of stenosis or significant regurgitations.  No vegetations seen on these valves. •  There is no evidence of pericardial effusion.      CT knee right wo contrast     Result Date: 11/17/2022  1.  Large knee joint effusion again noted. Suprapatellar region collection appears slightly larger now measuring 10.3 x 3.3 cm in axial dimension and was 8.8 x 2.8 cm. The collection again shows heterogeneous mixed density. 2.  Increasing lytic lucency of the lateral tibial plateau could represent osteomyelitis.  This report was finalized on 11/17/2022 1:15 PM by Dr. Oswaldo Brown MD.       XR Chest 1 View     Result Date: 11/21/2022  Redemonstrated elevation of the right hemidiaphragm with mild right basilar atelectasis/infiltrate. Jinny  Name: Esa Sanchez MD  Signed: 11/21/2022 12:22 AM  Workstation [facility]: [email or web address]  [facility] Venous Doppler Lower Extremity Right (duplex)     Result Date: 11/12/2022  No deep venous thrombus in the right lower extremity. Signer Name: Thomas Day MD  Signed: 11/12/2022 11:34 AM  Workstation Name: [facility] [place]  [facility] Venous Doppler Upper Extremity Bilateral (duplex)     Result Date: 11/12/2022  Negative examination.  No evidence of bilateral upper extremity venous thrombosis. Signer Name: Thomas Day MD  Signed: 11/12/2022 11:33 AM  Workstation Name: [facility]  [facility]     I have personally looked at the radiology images and read the final radiology report.     Assessment & Plan    Right knee septic arthritis with possible osteomyelitis--patient is planned to go to the OR on Wednesday of this week with his orthopedist in [place] for antibiotic spacer placement.  Patient is actually going up today for preop evaluation.     MRSA bacteremia/endocarditis patient has been continued on daptomycin per ID instructions.     Debility--patient requires contact-guard to standby assist for transfers.  Ambulated 60 feet with front wheel walker and supervision to standby assist last week.  Continues to work on motor skills to improve functional mobility.     Orthostatic hypotension much improved     Diabetes mellitus reasonably well-controlled continue current treatment     Hypothyroidism continue Synthroid     VTE Prophylaxis:       Mechanical Order History:               Ordered          11/17/22 1442   Place Sequential Compression Device  Once              11/17/22 1442   Maintain Sequential Compression Device  Continuous                               Pharmalogical Order History:                   Ordered     Dose Route Frequency Stop      11/18/22 1522   fondaparinux (ARIXTRA) injection 2.5 mg  Status:  Discontinued         2.5  mg SC Every 24 Hours Scheduled 22 0713      10/28/22 1742   fondaparinux (ARIXTRA) injection 2.5 mg  Status:  Discontinued         2.5 mg SC Every 24 Hours Scheduled 22 1442                           Joe Mike MD  [facility] Hospitalist  22  09:45 Meron Don PTA   Physical Therapist Assistant  Physical Therapy  Therapy Treatment Note     Signed  Date of Service:  22  Creation Time:  22     Signed        Expand All Collapse All  Inpatient Rehabilitation - Physical Therapy Treatment Note                                                               Isabela     Patient Name: Melchor Hardwick III                  : 1965                      MRN: [id number]     Today's Date: 2022                                                                                          Admit Date: 10/28/2022                        Visit Dx:   Visit Diagnosis       ICD-10-CM ICD-9-CM   1. Staphylococcal arthritis of right knee (HCC)  M00.061 711.06       041.10            Problem List       Patient Active Problem List   Diagnosis   • Hyperlipidemia   • Hypertensive disorder   • Obesity   • Type 2 diabetes mellitus with hyperglycemia, with long-term current use of insulin (HCC)   • Gas gangrene of foot (HCC)   • Cellulitis in diabetic foot (HCC)   • Other acute osteomyelitis of left foot (HCC)   • Osteomyelitis (HCC)   • Critical illness myopathy   • Staphylococcal arthritis of right knee (HCC)   • Other cirrhosis of liver (HCC)   • MRSA bacteremia   • Endocarditis of tricuspid valve   • Wound of right buttock   • History of osteomyelitis   • Debility            Medical History        Past Medical History:   Diagnosis Date   • Diabetes mellitus (HCC)     • Hyperlipidemia     • Hypertension     • Pyogenic arthritis of right knee joint, due to unspecified organism (HCC) 2022            Surgical History         Past Surgical History:   Procedure  Laterality Date   • ABSCESS DRAINAGE         PERINEUM   • AMPUTATION FOOT Left 5/18/2022     Procedure: AMPUTATION FOOT;  Surgeon: Addison Colby MD;  Location:  [facility] OR;  Service: Podiatry;  Laterality: Left;   • INCISION AND DRAINAGE LEG Left 05/10/2022     Procedure: INCISION AND DRAINAGE LOWER EXTREMITY;  Surgeon: Addison Colby MD;  Location:  [facility];  Service: Podiatry;  Laterality: Left;   • KNEE INCISION AND DRAINAGE Right 9/21/2022     Procedure: KNEE INCISION AND DRAINAGE RIGHT;  Surgeon: Brain Bentley MD;  Location:  [facility];  Service: Orthopedics;  Laterality: Right;   • WOUND DEBRIDEMENT Left 05/13/2022     Procedure: DEBRIDEMENT FOOT;  Surgeon: Addison Colby MD;  Location:  [facility];  Service: Podiatry;  Laterality: Left;                PT ASSESSMENT (last 12 hours)                IRF PT Evaluation and Treatment             Row Name 11/26/22 1506                   PT Time and Intention      Document Type daily treatment;other (see comments)  -RF        Mode of Treatment individual therapy;physical therapy  -RF        Patient/Family/Caregiver Comments/Observations Pt reports low back pain is better, but still severe. Pt also reports continued R knee pain.  -RF               Row Name 11/26/22 1506                   General Information      Patient Profile Reviewed yes  -RF        Existing Precautions/Restrictions fall;brace worn when out of bed  air cast LLE  -RF               Row Name 11/26/22 1506                   Cognition/Psychosocial      Affect/Mental Status (Cognition) WFL  -RF        Follows Commands (Cognition) WFL  -RF        Personal Safety Interventions gait belt;nonskid shoes/slippers when out of bed;fall prevention program maintained  -RF        Cognitive Function WFL  -RF               Row Name 11/26/22 1506                   Bed Mobility      Bed Mobility bed mobility (all) activities  -RF        Sit-Supine [place] (Bed Mobility) minimum assist (75% patient effort)   -RF        Assistive Device (Bed Mobility) bed rails  -RF               Row Name 11/26/22 1506                   Transfer Assessment/Treatment      Transfers bed-chair transfer;chair-bed transfer;sit-stand transfer;stand-sit transfer;car transfer  -RF               Row Name 11/26/22 1506                   Chair-Bed Transfer      Chair-Bed [place] (Transfers) contact guard;standby assist  -RF        Assistive Device (Chair-Bed Transfers) wheelchair;walker, front-wheeled  -RF               Row Name 11/26/22 1506                   Sit-Stand Transfer      Sit-Stand [place] (Transfers) contact guard;standby assist  -RF        Assistive Device (Sit-Stand Transfers) walker, front-wheeled;wheelchair  -RF               Row Name 11/26/22 1506                   Stand-Sit Transfer      Stand-Sit [place] (Transfers) contact guard;standby assist  -RF        Assistive Device (Stand-Sit Transfers) wheelchair;walker, front-wheeled  -RF               Row Name 11/26/22 1506                   Gait/Stairs (Locomotion)      Gait/Stairs Locomotion gait/ambulation independence;gait/ambulation assistive device;distance ambulated  -RF        [place] Level (Gait) standby assist;supervision  -RF        Assistive Device (Gait) walker, front-wheeled  -RF        Distance in Feet (Gait) 60  -RF        Pattern (Gait) step-to;step-through  -RF        Deviations/Abnormal Patterns (Gait) right sided deviations;antalgic;stride length decreased;weight shifting decreased  -RF        Bilateral Gait Deviations forward flexed posture  -RF        Comment, (Gait/Stairs) Forward flexed posture noted; increased knee flexion observed bilaterally.  -RF               Row Name 11/26/22 1506                   Safety Issues, Functional Mobility      Impairments Affecting Function (Mobility) balance;endurance/activity tolerance;pain;range of motion (ROM);postural/trunk control  -RF               Row Name 11/26/22 1506                   Balance       Dynamic Sitting Balance modified independence  bag toss/ with reacher with reach outside CODIE; pt cued for WB on LEs. Pt also performed bicep curl, chest press, overhead press, and circumduction with #2 ball in unsupported sit.  -RF               Row Name 11/26/22 1506                   Hip (Therapeutic Exercise)      Hip Strengthening (Therapeutic Exercise) bilateral;flexion;extension;aBduction;aDduction;heel slides;marching while seated;sitting;3 lb free weight;resistance band;blue;2 sets;10 repetitions  -RF               Row Name 11/26/22 1506                   Knee (Therapeutic Exercise)      Knee Strengthening (Therapeutic Exercise) bilateral;flexion;extension;heel slides;marching while seated;LAQ (long arc quad);hamstring curls;sitting;3 lb free weight;resistance band;blue;2 sets;10 repetitions  -RF               Row Name 11/26/22 1506                   Ankle (Therapeutic Exercise)      Ankle Strengthening (Therapeutic Exercise) bilateral;dorsiflexion;plantarflexion;sitting;3 lb free weight;2 sets;10 repetitions  -RF               Row Name 11/26/22 1506                   Aerobic Exercise      Type (Aerobic Exercise) recumbent stationary bike  pt unable to complete full revolution; pt performed arching motion for B knee flexion stretching.  -RF        Time Performed (Aerobic Exercise) 15  -RF        Comment, Aerobic Exercise (Therapeutic Exercise) LVL 1.0  -RF               Row Name 11/26/22 1506                   Positioning and Restraints      Pre-Treatment Position sitting in chair/recliner  -RF        Post Treatment Position bed  -RF        In Bed supine;call light within reach;encouraged to call for assist  -RF               Row Name 11/26/22 1501                   Daily Progress Summary (PT)      Functional Goal Overall Progress (PT) progressing toward functional goals slower than expected  -RF        Daily Progress Summary (PT) Mobility still significantly limited due to low back and knee pain.  Functional mobility and ambulation fair; CGA required.  -RF        Impairments Still Limiting Function (PT) functional activity tolerance impairment;pain;range of motion deficit;strength deficit  -RF        Recommendations (PT) Continue per current POC  -RF               Row Name 11/26/22 1506                   IRF PT Goals      Bed Mobility Goal Selection (PT-IRF) bed mobility, PT goal 1  -RF        Transfer Goal Selection (PT-IRF) transfers, PT goal 1  -RF        Gait (Walking Locomotion) Goal Selection (PT-IRF) gait, PT goal 1  -RF               Row Name 11/26/22 1506                   Bed Mobility Goal 1 (PT-IRF)      Activity/Assistive Device (Bed Mobility Goal 1, PT-IRF) bed mobility activities, all  -RF        [place] Level (Bed Mobility Goal 1, PT-IRF) independent  -RF        Time Frame (Bed Mobility Goal 1, PT-IRF) long-term goal (LTG)  -RF        Progress/Outcomes (Bed Mobility Goal 1, PT-IRF) goal met  -RF               Row Name 11/26/22 1506                   Transfer Goal 1 (PT-IRF)      Activity/Assistive Device (Transfer Goal 1, PT-IRF) sit-to-stand/stand-to-sit;bed-to-chair/chair-to-bed;walker, rolling  -RF        [place] Level (Transfer Goal 1, PT-IRF) modified independence  -RF        Time Frame (Transfer Goal 1, PT-IRF) long-term goal (LTG)  -RF        Progress/Outcomes (Transfer Goal 1, PT-IRF) new goal  -RF               Row Name 11/26/22 1506                   Gait/Walking Locomotion Goal 1 (PT-IRF)      Activity/Assistive Device (Gait/Walking Locomotion Goal 1, PT-IRF) gait (walking locomotion);assistive device use  -RF        Gait/Walking Locomotion Distance Goal 1 (PT-IRF) 100'  -RF        [place] Level (Gait/Walking Locomotion Goal 1, PT-IRF) modified independence  -RF        Time Frame (Gait/Walking Locomotion Goal 1, PT-IRF) long-term goal (LTG)  -RF        Progress/Outcomes (Gait/Walking Locomotion Goal 1, PT-IRF) goal revised this date  -RF                        User Key   (r) = Recorded By, (t) = Taken By, (c) = Cosigned By             Initials Name Provider Type      RF Meron Lei, LIZETT Physical Therapist Assistant                          Wound 09/21/22 1532 Right anterior knee Incision (Active)   Dressing Appearance open to air 11/26/22 0805   Closure Approximated 11/26/22 0805   Base dry;clean;pink 11/26/22 0805   Drainage Amount none 11/26/22 0805   Care, Wound other (see comments) 11/25/22 1915   Dressing Care open to air 11/26/22 0805       Wound 10/28/22 1827 Right coccyx Pressure Injury (Active)   Dressing Appearance open to air 11/26/22 0805   Closure Open to air 11/26/22 0805   Base pink;blanchable 11/26/22 0805   Drainage Amount none 11/26/22 0805   Care, Wound barrier applied;pressure removed 11/26/22 0805   Dressing Care open to air 11/26/22 0805          Physical Therapy Education              Title: PT OT SLP Therapies (Done)                Topic: Physical Therapy (Done)                Point: Mobility training (Done)                Learning Progress Summary                 Patient Acceptance, E,TB, VU by RF at 11/26/2022 1512     Acceptance, E, VU,NR by LB at 11/25/2022 1548     Acceptance, E,TB, VU by RF at 11/25/2022 1547     Acceptance, E,TB, VU by DL at 11/24/2022 0154     Acceptance, E,TB, VU by RF at 11/23/2022 1417     Acceptance, E,TB, VU by DL at 11/23/2022 0029     Acceptance, E,TB, VU by RF at 11/22/2022 1609     Acceptance, E,TB, VU by DL at 11/22/2022 0442     Acceptance, E,TB, VU by DL at 11/22/2022 0429     Acceptance, E,TB, VU by RF at 11/21/2022 1522     Acceptance, E,TB, VU,DU by HR at 11/18/2022 1546     Acceptance, E,TB, VU by RF at 11/18/2022 1525     Acceptance, E,TB, VU by RF at 11/17/2022 1528     Acceptance, E,TB, VU by DL at 11/17/2022 0233     Acceptance, E,TB, VU by RF at 11/16/2022 1606     Acceptance, E,TB, VU by DL at 11/15/2022 2033     Acceptance, E,TB, VU by RF at 11/15/2022 1541     Acceptance, E,TB, VU by DL at 11/15/2022  0305     Acceptance, E,TB, VU by RF at 11/14/2022 1528     Acceptance, E,TB, VU by RF at 11/10/2022 1551     Acceptance, E,TB, VU by RF at 11/9/2022 1622     Acceptance, E,TB, VU by DG at 11/9/2022 0050     Acceptance, E,TB, VU by RF at 11/8/2022 1539     Acceptance, E,TB, VU by DG at 11/8/2022 0114     Acceptance, E,TB, VU by RF at 11/7/2022 1607     Acceptance, E,TB, VU by DG at 11/6/2022 2023     Acceptance, E,TB, VU by RF at 11/4/2022 1551     Acceptance, E,TB, VU,DU by HR at 11/3/2022 1541     Acceptance, E,D, VU,NR by RG at 11/3/2022 1442     Acceptance, E,TB, VU by DG at 11/1/2022 2016     Acceptance, E,D, VU,NR by RG at 11/1/2022 1541     Acceptance, E,TB, VU by DG at 10/31/2022 2315     Acceptance, E,D, VU,NR by RG at 10/31/2022 1446                                   Point: Home exercise program (Done)                Learning Progress Summary                 Patient Acceptance, E,TB, VU by RF at 11/26/2022 1512     Acceptance, E, VU,NR by LB at 11/25/2022 1548     Acceptance, E,TB, VU by RF at 11/25/2022 1547     Acceptance, E,TB, VU by DL at 11/24/2022 0154     Acceptance, E,TB, VU by RF at 11/23/2022 1417     Acceptance, E,TB, VU by DL at 11/23/2022 0029     Acceptance, E,TB, VU by RF at 11/22/2022 1609     Acceptance, E,TB, VU by DL at 11/22/2022 0442     Acceptance, E,TB, VU by DL at 11/22/2022 0429     Acceptance, E,TB, VU by RF at 11/21/2022 1522     Acceptance, E,TB, VU,DU by HR at 11/18/2022 1546     Acceptance, E,TB, VU by RF at 11/18/2022 1525     Acceptance, E,TB, VU by RF at 11/17/2022 1528     Acceptance, E,TB, VU by DL at 11/17/2022 0233     Acceptance, E,TB, VU by RF at 11/16/2022 1606     Acceptance, E,TB, VU by DL at 11/15/2022 2033     Acceptance, E,TB, VU by RF at 11/15/2022 1541     Acceptance, E,TB, VU by DL at 11/15/2022 0305     Acceptance, E,TB, VU by RF at 11/14/2022 1528     Acceptance, E,TB, VU by RF at 11/10/2022 1551     Acceptance, E,TB, VU by RF at 11/9/2022 1622      Acceptance, E,TB, VU by DG at 11/9/2022 0050     Acceptance, E,TB, VU by RF at 11/8/2022 1539     Acceptance, E,TB, VU by DG at 11/8/2022 0114     Acceptance, E,TB, VU by RF at 11/7/2022 1607     Acceptance, E,TB, VU by DG at 11/6/2022 2023     Acceptance, E,TB, VU by RF at 11/4/2022 1551     Acceptance, E,TB, VU,DU by HR at 11/3/2022 1541     Acceptance, E,D, VU,NR by RG at 11/3/2022 1442     Acceptance, E,TB, VU by DG at 11/1/2022 2016     Acceptance, E,D, VU,NR by RG at 11/1/2022 1541     Acceptance, E,TB, VU by DG at 10/31/2022 2315     Acceptance, E,D, VU,NR by RG at 10/31/2022 1446                                   Point: Body mechanics (Done)                Learning Progress Summary                 Patient Acceptance, E,TB, VU by RF at 11/26/2022 1512     Acceptance, E, VU,NR by LB at 11/25/2022 1548     Acceptance, E,TB, VU by RF at 11/25/2022 1547     Acceptance, E,TB, VU by DL at 11/24/2022 0154     Acceptance, E,TB, VU by RF at 11/23/2022 1417     Acceptance, E,TB, VU by DL at 11/23/2022 0029     Acceptance, E,TB, VU by RF at 11/22/2022 1609     Acceptance, E,TB, VU by DL at 11/22/2022 0442     Acceptance, E,TB, VU by DL at 11/22/2022 0429     Acceptance, E,TB, VU by RF at 11/21/2022 1522     Acceptance, E,TB, VU,DU by HR at 11/18/2022 1546     Acceptance, E,TB, VU by RF at 11/18/2022 1525     Acceptance, E,TB, VU by RF at 11/17/2022 1528     Acceptance, E,TB, VU by DL at 11/17/2022 0233     Acceptance, E,TB, VU by RF at 11/16/2022 1606     Acceptance, E,TB, VU by DL at 11/15/2022 2033     Acceptance, E,TB, VU by RF at 11/15/2022 1541     Acceptance, E,TB, VU by DL at 11/15/2022 0305     Acceptance, E,TB, VU by RF at 11/14/2022 1528     Acceptance, E,TB, VU by RF at 11/10/2022 1551     Acceptance, E,TB, VU by RF at 11/9/2022 1622     Acceptance, E,TB, VU by DG at 11/9/2022 0050     Acceptance, E,TB, VU by RF at 11/8/2022 1539     Acceptance, E,TB, VU by DG at 11/8/2022 0114     Acceptance, E,TB, VU by RF  at 11/7/2022 1607     Acceptance, E,TB, VU by DG at 11/6/2022 2023     Acceptance, E,TB, VU by RF at 11/4/2022 1551     Acceptance, E,TB, VU,DU by HR at 11/3/2022 1541     Acceptance, E,D, VU,NR by RG at 11/3/2022 1442     Acceptance, E,TB, VU by DG at 11/1/2022 2016     Acceptance, E,D, VU,NR by RG at 11/1/2022 1541     Acceptance, E,TB, VU by DG at 10/31/2022 2315     Acceptance, E,D, VU,NR by RG at 10/31/2022 1446                                   Point: Precautions (Done)                Learning Progress Summary                 Patient Acceptance, E,TB, VU by RF at 11/26/2022 1512     Acceptance, E, VU,NR by LB at 11/25/2022 1548     Acceptance, E,TB, VU by RF at 11/25/2022 1547     Acceptance, E,TB, VU by DL at 11/24/2022 0154     Acceptance, E,TB, VU by RF at 11/23/2022 1417     Acceptance, E,TB, VU by DL at 11/23/2022 0029     Acceptance, E,TB, VU by RF at 11/22/2022 1609     Acceptance, E,TB, VU by DL at 11/22/2022 0442     Acceptance, E,TB, VU by DL at 11/22/2022 0429     Acceptance, E,TB, VU by RF at 11/21/2022 1522     Acceptance, E,TB, VU,DU by HR at 11/18/2022 1546     Acceptance, E,TB, VU by RF at 11/18/2022 1525     Acceptance, E,TB, VU by RF at 11/17/2022 1528     Acceptance, E,TB, VU by DL at 11/17/2022 0233     Acceptance, E,TB, VU by RF at 11/16/2022 1606     Acceptance, E,TB, VU by DL at 11/15/2022 2033     Acceptance, E,TB, VU by RF at 11/15/2022 1541     Acceptance, E,TB, VU by DL at 11/15/2022 0305     Acceptance, E,TB, VU by RF at 11/14/2022 1528     Acceptance, E,TB, VU by RF at 11/10/2022 1551     Acceptance, E,TB, VU by RF at 11/9/2022 1622     Acceptance, E,TB, VU by DG at 11/9/2022 0050     Acceptance, E,TB, VU by RF at 11/8/2022 1539     Acceptance, E,TB, VU by DG at 11/8/2022 0114     Acceptance, E,TB, VU by RF at 11/7/2022 1607     Acceptance, E,TB, VU by DG at 11/6/2022 2023     Acceptance, E,TB, VU by RF at 11/4/2022 1551     Acceptance, E,TB, VU,DU by HR at 11/3/2022 1541      Acceptance, E,D, VU,NR by  at 11/3/2022 1442     Acceptance, E,TB, VU by  at 11/1/2022 2016     Acceptance, E,D, VU,NR by  at 11/1/2022 1541     Acceptance, E,TB, VU by  at 10/31/2022 2315     Acceptance, E,D, VU,NR by  at 10/31/2022 1446                                                 User Key               Initials Effective Dates Name Provider Type Discipline       06/16/21 -  Phyllis Yañez, RN Registered Nurse Nurse      LB 06/16/21 -  Willow Villalta, PT Physical Therapist PT      RF 06/16/21 -  Meron Lei PTA Physical Therapist Assistant PT      RG 06/16/21 -  Michi Mckeon, PTA Physical Therapist Assistant PT      HR 01/14/22 -  Hanna Murphy, PTA Physical Therapist Assistant PT      DL 03/16/22 -  Hellen Mcdonnell, RN Registered Nurse Nurse                        PT Recommendation and Plan     Frequency of Treatment (PT): 5 times per week, 90 minutes per session  Daily Progress Summary (PT)  Functional Goal Overall Progress (PT): progressing toward functional goals slower than expected  Daily Progress Summary (PT): Mobility still significantly limited due to low back and knee pain. Functional mobility and ambulation fair; CGA required.  Impairments Still Limiting Function (PT): functional activity tolerance impairment, pain, range of motion deficit, strength deficit  Recommendations (PT): Continue per current POC                           Time Calculation:                PT Charges              Row Name 11/26/22 1512                         Time Calculation      Start Time 0915  -RF          Stop Time 1045  -RF          Time Calculation (min) 90 min  -RF          PT Received On 11/26/22  -RF          PT - Next Appointment 11/28/22  -RF          PT Goal Re-Cert Due Date 12/02/22  -RF                   Time Calculation- PT      Total Timed Code Minutes- PT 90 minute(s)  -RF                        Mandi Smith OT   Occupational Therapist  Occupational Therapy  Therapy  Treatment Note     Signed  Date of Service:  22  Creation Time:  22     Signed        Expand All Collapse All  Inpatient Rehabilitation - Occupational Therapy Treatment Note     YVONNE Gaines     Patient Name: Melchor Hardwick III                  : 1965                      MRN: [id number]     Today's Date: 2022                                                                           Admit Date: 10/28/2022        Visit Diagnosis       ICD-10-CM ICD-9-CM   1. Staphylococcal arthritis of right knee (HCC)  M00.061 711.06       041.10            Problem List       Patient Active Problem List   Diagnosis   • Hyperlipidemia   • Hypertensive disorder   • Obesity   • Type 2 diabetes mellitus with hyperglycemia, with long-term current use of insulin (HCC)   • Gas gangrene of foot (HCC)   • Cellulitis in diabetic foot (HCC)   • Other acute osteomyelitis of left foot (HCC)   • Osteomyelitis (HCC)   • Critical illness myopathy   • Staphylococcal arthritis of right knee (HCC)   • Other cirrhosis of liver (HCC)   • MRSA bacteremia   • Endocarditis of tricuspid valve   • Wound of right buttock   • History of osteomyelitis   • Debility            Medical History        Past Medical History:   Diagnosis Date   • Diabetes mellitus (HCC)     • Hyperlipidemia     • Hypertension     • Pyogenic arthritis of right knee joint, due to unspecified organism (HCC) 2022            Surgical History         Past Surgical History:   Procedure Laterality Date   • ABSCESS DRAINAGE         PERINEUM   • AMPUTATION FOOT Left 2022     Procedure: AMPUTATION FOOT;  Surgeon: Addison Colby MD;  Location: [facility];  Service: Podiatry;  Laterality: Left;   • INCISION AND DRAINAGE LEG Left 05/10/2022     Procedure: INCISION AND DRAINAGE LOWER EXTREMITY;  Surgeon: Addison Colby MD;  Location: [facility];  Service: Podiatry;  Laterality: Left;   • KNEE INCISION AND DRAINAGE Right 2022     Procedure: KNEE  INCISION AND DRAINAGE RIGHT;  Surgeon: Brain Bentley MD;  Location:  [facility];  Service: Orthopedics;  Laterality: Right;   • WOUND DEBRIDEMENT Left 05/13/2022     Procedure: DEBRIDEMENT FOOT;  Surgeon: Addison Colby MD;  Location:  [facility];  Service: Podiatry;  Laterality: Left;                               IRF OT ASSESSMENT FLOWSHEET (last 12 hours)                IRF OT Evaluation and Treatment             Row Name 11/25/22 1408 11/25/22 1200              OT Time and Intention      Document Type daily treatment  - daily treatment  -      Mode of Treatment occupational therapy  - individual therapy;occupational therapy  -      Patient Effort good  -BF good  -      Symptoms Noted During/After Treatment none  -BF --             [facility] Name 11/25/22 1408 11/25/22 1200              General Information      Patient/Family/Caregiver Comments/Observations -- patient agreeable to therapy  -      Existing Precautions/Restrictions fall  -BF fall  -[facility] Name 11/25/22 1408 11/25/22 1200              Cognition/Psychosocial      Affect/Mental Status (Cognition) -- WFL  -      Orientation Status (Cognition) oriented x 3  -BF --      Follows Commands (Cognition) L  - --             [facility] Name 11/25/22 1200                   Upper Body Dressing      [place] Level (Upper Body Dressing) set up assistance  -               Row Name 11/25/22 1200                   Lower Body Dressing      [place] Level (Lower Body Dressing) minimum assist (75% patient effort);don;shoes/slippers;socks;moderate assist (50% patient effort)  -[facility] Name 11/25/22 1200                   Bed-Chair Transfer      Bed-Chair [place] (Transfers) minimum assist (75% patient effort);contact guard  -               Row Name 11/25/22 1408 11/25/22 1200              Motor Skills      Motor Skills -- coordination;functional endurance  -      Motor Control/Coordination Interventions fine motor  manipulation/dexterity activities;gross motor coordination activities;therapeutic exercise/ROM  BUE GMC/FMC theract, light strengthening; BUE flexbar therex  -BF --      Therapeutic Exercise -- --  BUE ROM, UE PRE, gmc,fmc,  -AH             Row Name 11/25/22 1200                   Positioning and Restraints      Post Treatment Position wheelchair  -        In Wheelchair sitting;with OT  -                        User Key  (r) = Recorded By, (t) = Taken By, (c) = Cosigned By             Initials Name Effective Dates      BF Mandi Smith, OT 06/16/21 -        Jazmin Goldman, OT 06/16/21 -                                 OT Recommendation and Plan                             Time Calculation:                Time Calculation- OT              Row Name 11/25/22 1420 11/25/22 1247                    Time Calculation- OT      OT Start Time 1055  - 0745  -        OT Stop Time 1140  - 0830  -        OT Time Calculation (min) 45 min  -BF 45 min  -        Total Timed Code Minutes- OT 45 minute(s)  -BF --        OT Non-Billable Time (min) 10 min  -BF --

## 2022-11-28 NOTE — THERAPY TREATMENT NOTE
Inpatient Rehabilitation - Physical Therapy Treatment Note       YVONNE Gaines     Patient Name: Melchor Hardwick III  : 1965  MRN: [id number]    Today's Date: 2022                    Admit Date: 10/28/2022      Visit Dx:     ICD-10-CM ICD-9-CM   1. Staphylococcal arthritis of right knee (HCC)  M00.061 711.06     041.10       Patient Active Problem List   Diagnosis   • Hyperlipidemia   • Hypertensive disorder   • Obesity   • Type 2 diabetes mellitus with hyperglycemia, with long-term current use of insulin (HCC)   • Gas gangrene of foot (HCC)   • Cellulitis in diabetic foot (HCC)   • Other acute osteomyelitis of left foot (HCC)   • Osteomyelitis (HCC)   • Critical illness myopathy   • Staphylococcal arthritis of right knee (HCC)   • Other cirrhosis of liver (HCC)   • MRSA bacteremia   • Endocarditis of tricuspid valve   • Wound of right buttock   • History of osteomyelitis   • Debility       Past Medical History:   Diagnosis Date   • Diabetes mellitus (HCC)    • Hyperlipidemia    • Hypertension    • Pyogenic arthritis of right knee joint, due to unspecified organism (HCC) 2022       Past Surgical History:   Procedure Laterality Date   • ABSCESS DRAINAGE      PERINEUM   • AMPUTATION FOOT Left 2022    Procedure: AMPUTATION FOOT;  Surgeon: Addison Colby MD;  Location: [facility] OR;  Service: Podiatry;  Laterality: Left;   • INCISION AND DRAINAGE LEG Left 05/10/2022    Procedure: INCISION AND DRAINAGE LOWER EXTREMITY;  Surgeon: Addison Colby MD;  Location:  [facility] OR;  Service: Podiatry;  Laterality: Left;   • KNEE INCISION AND DRAINAGE Right 2022    Procedure: KNEE INCISION AND DRAINAGE RIGHT;  Surgeon: Brain Bentley MD;  Location: [facility] OR;  Service: Orthopedics;  Laterality: Right;   • WOUND DEBRIDEMENT Left 2022    Procedure: DEBRIDEMENT FOOT;  Surgeon: Addison Colby MD;  Location: [facility] OR;  Service: Podiatry;  Laterality: Left;       PT ASSESSMENT (last 12 hours)     IRF PT  Evaluation and Treatment     Row Name 11/28/22 [facility]6          PT Time and Intention    Document Type daily treatment;other (see comments)  -     Mode of Treatment individual therapy;physical therapy  -     Patient/Family/Caregiver Comments/Observations Pt c/o back pain.  He was only able to get 50minutes in today secondary to leaving facility for Drs appointment.  -     Row Name 11/28/22 [facility]6          General Information    Patient Profile Reviewed yes  -RG     Existing Precautions/Restrictions fall;brace worn when out of bed  air cast LLE  -     Row Name 11/28/22 [facility]6          Cognition/Psychosocial    Affect/Mental Status (Cognition) WFL  -RG     Follows Commands (Cognition) L  -     Personal Safety Interventions gait belt;fall prevention program maintained;nonskid shoes/slippers when out of bed  -RG     Cognitive Function WFL  -     Row Name 11/28/22 [facility]6          Bed Mobility    Bed Mobility --  -RG     Sit-Supine [place] (Bed Mobility) --  -RG     Assistive Device (Bed Mobility) --  -RG     Comment, (Bed Mobility) not observed  -     Row Name 11/28/22 [facility]6          Transfer Assessment/Treatment    Transfers bed-chair transfer;chair-bed transfer;sit-stand transfer;stand-sit transfer;car transfer  -     Row Name 11/28/22 1116          Chair-Bed Transfer    Chair-Bed [place] (Transfers) --  -RG     Assistive Device (Chair-Bed Transfers) --  -     Row Name 11/28/22 [employer]          Sit-Stand Transfer    Sit-Stand [place] (Transfers) contact guard;standby assist  -     Assistive Device (Sit-Stand Transfers) walker, front-wheeled;wheelchair  -     Row Name 11/28/22 111          Stand-Sit Transfer    Stand-Sit [place] (Transfers) contact guard;standby assist  -     Assistive Device (Stand-Sit Transfers) wheelchair;walker, front-wheeled  -     Row Name 11/28/22 1116          Car Transfer    Type (Car Transfer) stand pivot/stand step  -     [place] Level (Car Transfer)  contact guard;verbal cues;nonverbal cues (demo/gesture);1 person assist  -     Row Name 11/28/22 1116          Gait/Stairs (Locomotion)    Gait/Stairs Locomotion gait/ambulation independence;gait/ambulation assistive device;distance ambulated  -RG     [place] Level (Gait) standby assist;supervision  -RG     Assistive Device (Gait) walker, front-wheeled  -RG     Distance in Feet (Gait) 60  -RG     Pattern (Gait) step-to;step-through  -RG     Deviations/Abnormal Patterns (Gait) right sided deviations;antalgic;stride length decreased;weight shifting decreased  -RG     Bilateral Gait Deviations forward flexed posture  -RG     Right Sided Gait Deviations heel strike decreased;weight shift ability decreased  -     Row Name 11/28/22 1116          Safety Issues, Functional Mobility    Impairments Affecting Function (Mobility) balance;endurance/activity tolerance;pain;range of motion (ROM);postural/trunk control  -     Row Name 11/28/22 1116          Hip (Therapeutic Exercise)    Hip Strengthening (Therapeutic Exercise) bilateral;flexion;aBduction;aDduction;marching while seated;sitting;2 lb free weight;resistance band;green;10 repetitions;2 sets  -     Row Name 11/28/22 1116          Knee (Therapeutic Exercise)    Knee Strengthening (Therapeutic Exercise) bilateral;flexion;extension;marching while seated;LAQ (long arc quad);sitting;2 lb free weight;resistance band;green;10 repetitions;2 sets  -     Row Name 11/28/22 1116          Ankle (Therapeutic Exercise)    Ankle Strengthening (Therapeutic Exercise) bilateral;dorsiflexion;plantarflexion;sitting;10 repetitions;2 sets  -     Row Name 11/28/22 1116          Positioning and Restraints    Pre-Treatment Position sitting in chair/recliner  -RG     Post Treatment Position other  car for DRs appointment  -     Row Name 11/28/22 1116          Daily Progress Summary (PT)    Impairments Still Limiting Function (PT) functional activity tolerance  impairment;pain;range of motion deficit;strength deficit  -RG     Row Name 11/28/22 1116          IRF PT Goals    Bed Mobility Goal Selection (PT-IRF) bed mobility, PT goal 1  -RG     Transfer Goal Selection (PT-IRF) transfers, PT goal 1  -RG     Gait (Walking Locomotion) Goal Selection (PT-IRF) gait, PT goal 1  -RG     Row Name 11/28/22 1116          Bed Mobility Goal 1 (PT-IRF)    Activity/Assistive Device (Bed Mobility Goal 1, PT-IRF) bed mobility activities, all  -RG     [place] Level (Bed Mobility Goal 1, PT-IRF) independent  -RG     Time Frame (Bed Mobility Goal 1, PT-IRF) long-term goal (LTG)  -RG     Progress/Outcomes (Bed Mobility Goal 1, PT-IRF) goal met  -RG     Row Name 11/28/22 1116          Transfer Goal 1 (PT-IRF)    Activity/Assistive Device (Transfer Goal 1, PT-IRF) sit-to-stand/stand-to-sit;bed-to-chair/chair-to-bed;walker, rolling  -RG     [place] Level (Transfer Goal 1, PT-IRF) modified independence  -RG     Time Frame (Transfer Goal 1, PT-IRF) long-term goal (LTG)  -RG     Progress/Outcomes (Transfer Goal 1, PT-IRF) new goal  -RG     Row Name 11/28/22 1116          Gait/Walking Locomotion Goal 1 (PT-IRF)    Activity/Assistive Device (Gait/Walking Locomotion Goal 1, PT-IRF) gait (walking locomotion);assistive device use  -RG     Gait/Walking Locomotion Distance Goal 1 (PT-IRF) 100'  -RG     [place] Level (Gait/Walking Locomotion Goal 1, PT-IRF) modified independence  -RG     Time Frame (Gait/Walking Locomotion Goal 1, PT-IRF) long-term goal (LTG)  -RG     Progress/Outcomes (Gait/Walking Locomotion Goal 1, PT-IRF) goal revised this date  -RG           User Key  (r) = Recorded By, (t) = Taken By, (c) = Cosigned By    Initials Name Provider Type    RG Michi Mckeon PTA Physical Therapist Assistant              Wound 09/21/22 1532 Right anterior knee Incision (Active)   Dressing Appearance open to air 11/27/22 2130   Closure Approximated 11/28/22 0936   Base clean;dry 11/27/22 2130    Periwound intact 11/27/22 2130   Periwound Temperature warm 11/27/22 2130   Periwound Skin Turgor soft 11/27/22 2130   Drainage Amount none 11/27/22 2130   Dressing Care open to air 11/28/22 0936       Wound 10/28/22 1827 Right coccyx Pressure Injury (Active)   Dressing Appearance open to air 11/28/22 0936   Closure Open to air 11/27/22 2130   Base blanchable;red 11/28/22 0936   Periwound intact 11/27/22 2130   Periwound Temperature warm 11/27/22 2130   Periwound Skin Turgor soft 11/27/22 2130   Drainage Amount none 11/27/22 2130   Care, Wound pressure removed;barrier applied 11/28/22 0936   Dressing Care open to air 11/28/22 0936   Periwound Care dry periwound area maintained 11/27/22 2130     Physical Therapy Education     Title: PT OT SLP Therapies (Done)     Topic: Physical Therapy (Done)     Point: Mobility training (Done)     Learning Progress Summary           Patient Acceptance, E,D, VU,NR by RG at 11/28/2022 1126    Acceptance, E,TB, VU by RF at 11/26/2022 1512    Acceptance, E, VU,NR by LB at 11/25/2022 1548    Acceptance, E,TB, VU by RF at 11/25/2022 1547    Acceptance, E,TB, VU by DL at 11/24/2022 0154    Acceptance, E,TB, VU by RF at 11/23/2022 1417    Acceptance, E,TB, VU by DL at 11/23/2022 0029    Acceptance, E,TB, VU by RF at 11/22/2022 1609    Acceptance, E,TB, VU by DL at 11/22/2022 0442    Acceptance, E,TB, VU by DL at 11/22/2022 0429    Acceptance, E,TB, VU by RF at 11/21/2022 1522    Acceptance, E,TB, VU,DU by HR at 11/18/2022 1546    Acceptance, E,TB, VU by RF at 11/18/2022 1525    Acceptance, E,TB, VU by RF at 11/17/2022 1528    Acceptance, E,TB, VU by DL at 11/17/2022 0233    Acceptance, E,TB, VU by RF at 11/16/2022 1606    Acceptance, E,TB, VU by DL at 11/15/2022 2033    Acceptance, E,TB, VU by RF at 11/15/2022 1541    Acceptance, E,TB, VU by DL at 11/15/2022 0305    Acceptance, E,TB, VU by RF at 11/14/2022 1528    Acceptance, E,TB, VU by RF at 11/10/2022 1551    Acceptance, E,TB, VU by  RF at 11/9/2022 1622    Acceptance, E,TB, VU by DG at 11/9/2022 0050    Acceptance, E,TB, VU by RF at 11/8/2022 1539    Acceptance, E,TB, VU by DG at 11/8/2022 0114    Acceptance, E,TB, VU by RF at 11/7/2022 1607    Acceptance, E,TB, VU by DG at 11/6/2022 2023    Acceptance, E,TB, VU by RF at 11/4/2022 1551    Acceptance, E,TB, VU,DU by HR at 11/3/2022 1541    Acceptance, E,D, VU,NR by RG at 11/3/2022 1442    Acceptance, E,TB, VU by DG at 11/1/2022 2016    Acceptance, E,D, VU,NR by RG at 11/1/2022 1541    Acceptance, E,TB, VU by DG at 10/31/2022 2315    Acceptance, E,D, VU,NR by RG at 10/31/2022 1446                   Point: Home exercise program (Done)     Learning Progress Summary           Patient Acceptance, E,D, VU,NR by RG at 11/28/2022 1126    Acceptance, E,TB, VU by RF at 11/26/2022 1512    Acceptance, E, VU,NR by LB at 11/25/2022 1548    Acceptance, E,TB, VU by RF at 11/25/2022 1547    Acceptance, E,TB, VU by DL at 11/24/2022 0154    Acceptance, E,TB, VU by RF at 11/23/2022 1417    Acceptance, E,TB, VU by DL at 11/23/2022 0029    Acceptance, E,TB, VU by RF at 11/22/2022 1609    Acceptance, E,TB, VU by DL at 11/22/2022 0442    Acceptance, E,TB, VU by DL at 11/22/2022 0429    Acceptance, E,TB, VU by RF at 11/21/2022 1522    Acceptance, E,TB, VU,DU by HR at 11/18/2022 1546    Acceptance, E,TB, VU by RF at 11/18/2022 1525    Acceptance, E,TB, VU by RF at 11/17/2022 1528    Acceptance, E,TB, VU by DL at 11/17/2022 0233    Acceptance, E,TB, VU by RF at 11/16/2022 1606    Acceptance, E,TB, VU by DL at 11/15/2022 2033    Acceptance, E,TB, VU by RF at 11/15/2022 1541    Acceptance, E,TB, VU by DL at 11/15/2022 0305    Acceptance, E,TB, VU by RF at 11/14/2022 1528    Acceptance, E,TB, VU by RF at 11/10/2022 1551    Acceptance, E,TB, VU by RF at 11/9/2022 1622    Acceptance, E,TB, VU by DG at 11/9/2022 0050    Acceptance, E,TB, VU by RF at 11/8/2022 1539    Acceptance, E,TB, VU by DG at 11/8/2022 0114    Acceptance,  E,TB, VU by RF at 11/7/2022 1607    Acceptance, E,TB, VU by DG at 11/6/2022 2023    Acceptance, E,TB, VU by RF at 11/4/2022 1551    Acceptance, E,TB, VU,DU by HR at 11/3/2022 1541    Acceptance, E,D, VU,NR by RG at 11/3/2022 1442    Acceptance, E,TB, VU by DG at 11/1/2022 2016    Acceptance, E,D, VU,NR by RG at 11/1/2022 1541    Acceptance, E,TB, VU by DG at 10/31/2022 2315    Acceptance, E,D, VU,NR by RG at 10/31/2022 1446                   Point: Body mechanics (Done)     Learning Progress Summary           Patient Acceptance, E,D, VU,NR by RG at 11/28/2022 1126    Acceptance, E,TB, VU by RF at 11/26/2022 1512    Acceptance, E, VU,NR by LB at 11/25/2022 1548    Acceptance, E,TB, VU by RF at 11/25/2022 1547    Acceptance, E,TB, VU by DL at 11/24/2022 0154    Acceptance, E,TB, VU by RF at 11/23/2022 1417    Acceptance, E,TB, VU by DL at 11/23/2022 0029    Acceptance, E,TB, VU by RF at 11/22/2022 1609    Acceptance, E,TB, VU by DL at 11/22/2022 0442    Acceptance, E,TB, VU by DL at 11/22/2022 0429    Acceptance, E,TB, VU by RF at 11/21/2022 1522    Acceptance, E,TB, VU,DU by HR at 11/18/2022 1546    Acceptance, E,TB, VU by RF at 11/18/2022 1525    Acceptance, E,TB, VU by RF at 11/17/2022 1528    Acceptance, E,TB, VU by DL at 11/17/2022 0233    Acceptance, E,TB, VU by RF at 11/16/2022 1606    Acceptance, E,TB, VU by DL at 11/15/2022 2033    Acceptance, E,TB, VU by RF at 11/15/2022 1541    Acceptance, E,TB, VU by DL at 11/15/2022 0305    Acceptance, E,TB, VU by RF at 11/14/2022 1528    Acceptance, E,TB, VU by RF at 11/10/2022 1551    Acceptance, E,TB, VU by RF at 11/9/2022 1622    Acceptance, E,TB, VU by DG at 11/9/2022 0050    Acceptance, E,TB, VU by RF at 11/8/2022 1539    Acceptance, E,TB, VU by DG at 11/8/2022 0114    Acceptance, E,TB, VU by RF at 11/7/2022 1607    Acceptance, E,TB, VU by DG at 11/6/2022 2023    Acceptance, E,TB, VU by RF at 11/4/2022 1551    Acceptance, E,TB, VU,DU by HR at 11/3/2022 1541     Acceptance, E,D, VU,NR by RG at 11/3/2022 1442    Acceptance, E,TB, VU by DG at 11/1/2022 2016    Acceptance, E,D, VU,NR by RG at 11/1/2022 1541    Acceptance, E,TB, VU by DG at 10/31/2022 2315    Acceptance, E,D, VU,NR by RG at 10/31/2022 1446                   Point: Precautions (Done)     Learning Progress Summary           Patient Acceptance, E,D, VU,NR by RG at 11/28/2022 1126    Acceptance, E,TB, VU by RF at 11/26/2022 1512    Acceptance, E, VU,NR by LB at 11/25/2022 1548    Acceptance, E,TB, VU by RF at 11/25/2022 1547    Acceptance, E,TB, VU by DL at 11/24/2022 0154    Acceptance, E,TB, VU by RF at 11/23/2022 1417    Acceptance, E,TB, VU by DL at 11/23/2022 0029    Acceptance, E,TB, VU by RF at 11/22/2022 1609    Acceptance, E,TB, VU by DL at 11/22/2022 0442    Acceptance, E,TB, VU by DL at 11/22/2022 0429    Acceptance, E,TB, VU by RF at 11/21/2022 1522    Acceptance, E,TB, VU,DU by HR at 11/18/2022 1546    Acceptance, E,TB, VU by RF at 11/18/2022 1525    Acceptance, E,TB, VU by RF at 11/17/2022 1528    Acceptance, E,TB, VU by DL at 11/17/2022 0233    Acceptance, E,TB, VU by RF at 11/16/2022 1606    Acceptance, E,TB, VU by DL at 11/15/2022 2033    Acceptance, E,TB, VU by RF at 11/15/2022 1541    Acceptance, E,TB, VU by DL at 11/15/2022 0305    Acceptance, E,TB, VU by RF at 11/14/2022 1528    Acceptance, E,TB, VU by RF at 11/10/2022 1551    Acceptance, E,TB, VU by RF at 11/9/2022 1622    Acceptance, E,TB, VU by DG at 11/9/2022 0050    Acceptance, E,TB, VU by RF at 11/8/2022 1539    Acceptance, E,TB, VU by DG at 11/8/2022 0114    Acceptance, E,TB, VU by RF at 11/7/2022 1607    Acceptance, E,TB, VU by DG at 11/6/2022 2023    Acceptance, E,TB, VU by RF at 11/4/2022 1551    Acceptance, E,TB, VU,DU by HR at 11/3/2022 1541    Acceptance, E,D, VU,NR by RG at 11/3/2022 1442    Acceptance, E,TB, VU by DG at 11/1/2022 2016    Acceptance, E,D, VU,NR by RG at 11/1/2022 1541    Acceptance, E,TB, VU by DG at 10/31/2022  2315    Acceptance, E,D, VU,NR by RG at 10/31/2022 1446                               User Key     Initials Effective Dates Name Provider Type Discipline    DG 06/16/21 -  Phyllis Yañez, RN Registered Nurse Nurse    LB 06/16/21 -  Willow Villlata, PT Physical Therapist PT    RF 06/16/21 -  Meron Lei PTA Physical Therapist Assistant PT    RG 06/16/21 -  Michi Mckeon, LIZETT Physical Therapist Assistant PT    HR 01/14/22 -  Hanna Murphy, LIZETT Physical Therapist Assistant PT    DL 03/16/22 -  Hellen Mcdonnell RN Registered Nurse Nurse                PT Recommendation and Plan    Frequency of Treatment (PT): 5 times per week, 90 minutes per session     Daily Progress Summary (PT)  Impairments Still Limiting Function (PT): functional activity tolerance impairment, pain, range of motion deficit, strength deficit               Time Calculation:      PT Charges     Row Name 11/28/22 1126             Time Calculation    Start Time 0915  -RG      Stop Time 1005  -RG      Time Calculation (min) 50 min  -RG      PT Received On 11/28/22  -         Time Calculation- PT    Total Timed Code Minutes- PT 50 minute(s)  -            User Key  (r) = Recorded By, (t) = Taken By, (c) = Cosigned By    Initials Name Provider Type     Michi Mckeon PTA Physical Therapist Assistant                Therapy Charges for Today     Code Description Service Date Service Provider Modifiers Qty    98904202346 HC GAIT TRAINING EA 15 MIN 11/28/2022 Michi Mckeon PTA GP, CQ 1    73036413515 HC PT THERAPEUTIC ACT EA 15 MIN 11/28/2022 Michi Mckeon PTA GP, CQ 1    90422168490 HC PT THER PROC EA 15 MIN 11/28/2022 Michi Mckeon PTA GP, CQ 1                   Michi Mckeon PTA  11/28/2022

## 2022-11-28 NOTE — SIGNIFICANT NOTE
11/28/22 1159   Plan   Plan Pt went to [facility] for pre-op labs and EKG; sister provided transportation.  Pt to have Ortho surgery on 11-30-22 at [facility]; pt to get a time to arrive for surgery today.

## 2022-11-28 NOTE — PROGRESS NOTES
[facility]  PROGRESS NOTE     Patient Identification:  Name:  Melchor Hardwick III  Age:  57 y.o.  Sex:  male  :  1965  MRN:  [id number]  Visit Number:  [id number]  ROOM: 109/2S     Primary Care Provider:  Missy Up APRN    Length of stay in inpatient status:  31    Subjective     Chief Compliant:  No chief complaint on file.      History of Presenting Illness: 57-year-old gentleman with history of right septic knee arthritis, MRSA bacteremia, likely endocarditis.  Patient states he is doing well at this time is scheduled to go up this week for a antibiotic spacer placement involving the right knee.  Patient has no new complaints at this time    Objective     Current Hospital Meds:ascorbic acid, 500 mg, Oral, Daily  DAPTOmycin, 6 mg/kg (Adjusted), Intravenous, Q24H  Diclofenac Sodium, 4 g, Topical, 4x Daily  docusate sodium, 100 mg, Oral, BID  fludrocortisone, 50 mcg, Oral, Daily  Insulin Aspart, 0-14 Units, Subcutaneous, TID AC  Insulin Aspart, 4 Units, Subcutaneous, TID AC  insulin detemir, 17 Units, Subcutaneous, Q12H  iron polysaccharides, 150 mg, Oral, Daily  levothyroxine, 25 mcg, Oral, Q AM  lidocaine PF 2%, 10 mL, Injection, Once  midodrine, 2.5 mg, Oral, TID AC  multivitamin with minerals, 1 tablet, Oral, Daily  pantoprazole, 40 mg, Oral, Q AM  polyethylene glycol, 17 g, Oral, Daily  pregabalin, 100 mg, Oral, Q12H    Pharmacy Consult,       ----------------------------------------------------------------------------------------------------------------------  Vital Signs:  Temp:  [98.1 °F (36.7 °C)] 98.1 °F (36.7 °C)  Heart Rate:  [73-79] 73  Resp:  [18] 18  BP: (113-144)/(64-80) 130/77  SpO2:  [96 %] 96 %  on   ;   Device (Oxygen Therapy): room air  Body mass index is 28.34 kg/m².    Wt Readings from Last 3 Encounters:   22 89.6 kg (197 lb 8.5 oz)   22 98 kg (216 lb)   22 115 kg (254 lb)     Intake & Output (last 3 days)        0701   0700   0701 11/27 0700 11/27 0701 11/28 0700 11/28 0701 11/29 0700    P.O. 1440 1440 1200 240    I.V. (mL/kg) 200 (2.2)       Total Intake(mL/kg) 1640 (18.3) 1440 (16.1) 1200 (13.4) 240 (2.7)    Urine (mL/kg/hr) 2450 (1.1) 3875 (1.8) 3300 (1.5)     Stool 0 0 0     Total Output 2450 3875 3300     Net -810 -2435 -2100 +240            Urine Unmeasured Occurrence  2 x      Stool Unmeasured Occurrence 1 x 1 x 1 x         Diet: Texture: Regular Texture (IDDSI 7); Fluid Consistency: Regular Consistency; Diabetic Diets; Consistent Carbohydrate  ----------------------------------------------------------------------------------------------------------------------  Physical exam:  Constitutional:   No acute distress  HEENT: Normocephalic atraumatic  Neck:    Supple  Cardiovascular: Regular rate and rhythm  Pulmonary/Chest: Clear to auscultation  Abdominal: Positive bowel sounds soft.   Musculoskeletal: Right knee moderate swelling noted  Neurological: No focal deficits  Skin: No rash  Peripheral vascular:  Genitourinary:  ----------------------------------------------------------------------------------------------------------------------    Last echocardiogram:  Results for orders placed during the hospital encounter of 10/28/22    Adult Transesophageal Echo (TYRESE) W/ Cont if Necessary Per Protocol    Interpretation Summary  •  Indication for TYRESE -to rule out infective endocarditis in a patient with MRSA bacteremia.  •  Findings-  •  Left ventricular systolic function is normal. Estimated left ventricular EF = 60%  •  Left atrial appendage is well visualized and is free of thrombus.  •  Saline test was negative for the evidence of shunt in interatrial septum.  •  The tricuspid valve appeared normal in structure. There was a moderate sized echodense structure seen above  the posterior tricuspid leaflet but not attached to the leaflet. The appearance and location are not typical for regurgitation. This structure could be a normal  variant. No evidence of tricuspid valve stenosis or significant regurgitations.  •  Other valves also appear normal in structure with no evidence of stenosis or significant regurgitations.  No vegetations seen on these valves.  •  There is no evidence of pericardial effusion.  •  Comment-  •  In view of presence of MRSA bacteremia, recommend to extend antibiotic therapy for possible infective endocarditis and repeat TRYESE in 3 months.    ----------------------------------------------------------------------------------------------------------------------  Results from last 7 days   Lab Units 11/26/22 0139 11/24/22 0112 11/23/22  0725   CRP mg/dL 5.45* 6.62* 9.45*   WBC 10*3/mm3 4.20 3.59 3.27*   HEMOGLOBIN g/dL 8.7* 8.9* 10.5*   HEMATOCRIT % 27.3* 29.1* 33.2*   MCV fL 81.7 82.2 82.4   MCHC g/dL 31.9 30.6* 31.6   PLATELETS 10*3/mm3 111* 128* 141         Results from last 7 days   Lab Units 11/26/22 0139 11/24/22  0112 11/23/22  0725   SODIUM mmol/L 133* 135* 133*   POTASSIUM mmol/L 3.9 3.9 3.6   CHLORIDE mmol/L 100 101 97*   CO2 mmol/L 26.2 25.7 25.7   BUN mg/dL 11 10 12   CREATININE mg/dL 0.69* 0.69* 0.68*   CALCIUM mg/dL 8.0* 8.4* 8.8   GLUCOSE mg/dL 227* 97 121*   ALBUMIN g/dL 2.31* 2.34*  --    BILIRUBIN mg/dL 0.3 0.4  --    ALK PHOS U/L 235* 249*  --    AST (SGOT) U/L 30 32  --    ALT (SGPT) U/L 23 22  --    Estimated Creatinine Clearance: 133 mL/min (A) (by C-G formula based on SCr of 0.69 mg/dL (L)).  No results found for: AMMONIA  Results from last 7 days   Lab Units 11/26/22  0139   CK TOTAL U/L 26             Glucose   Date/Time Value Ref Range Status   11/28/2022 0628 173 (H) 70 - 130 mg/dL Final     Comment:     Meter: [id number] : [id number] YAMILET GAMA   11/27/2022 2014 231 (H) 70 - 130 mg/dL Final     Comment:     Meter: [id number] : [id number] YAMILET GAMA   11/27/2022 1702 189 (H) 70 - 130 mg/dL Final     Comment:     Meter: [id number] : [id number] mark torres   11/27/2022  1122 169 (H) 70 - 130 mg/dL Final     Comment:     Meter: [id number] : [id number] mark torres   11/27/2022 0925 182 (H) 70 - 130 mg/dL Final     Comment:     Meter: [id number] : [id number] Steve Yancey   11/27/2022 0614 90 70 - 130 mg/dL Final     Comment:     Meter: [id number] : [id number] James Wilkinson   11/26/2022 1951 223 (H) 70 - 130 mg/dL Final     Comment:     Meter: [id number] : [id number] YAMILET GAMA   11/26/2022 1634 218 (H) 70 - 130 mg/dL Final     Comment:     Meter: [id number] : [id number] lisa ramirez     Lab Results   Component Value Date    TSH 2.390 10/10/2022    FREET4 0.87 (L) 10/10/2022     No results found for: PREGTESTUR, PREGSERUM, HCG, HCGQUANT  Pain Management Panel     Pain Management Panel Latest Ref Rng & Units 5/24/2022 5/11/2022    CREATININE UR mg/dL 91.0 -    AMPHETAMINES SCREEN, URINE Negative - Negative    BARBITURATES SCREEN Negative - Negative    BENZODIAZEPINE SCREEN, URINE Negative - Negative    BUPRENORPHINEUR Negative - Negative    COCAINE SCREEN, URINE Negative - Negative    METHADONE SCREEN, URINE Negative - Negative    METHAMPHETAMINEUR Negative - Negative        Brief Urine Lab Results  (Last result in the past 365 days)      Color   Clarity   Blood   Leuk Est   Nitrite   Protein   CREAT   Urine HCG        11/21/22 0341 Yellow   Clear   Negative   Negative   Negative   Negative               Blood Culture   Date Value Ref Range Status   11/23/2022 No growth at 4 days  Preliminary   11/23/2022 No growth at 4 days  Preliminary   11/22/2022 Staphylococcus aureus, MRSA (C)  Final     Comment:       Infectious disease consultation is highly recommended to rule out distant foci of infection.  Methicillin resistant Staphylococcus aureus, Patient may be an isolation risk.   11/22/2022 Staphylococcus aureus, MRSA (C)  Final     Comment:       Infectious disease consultation is highly recommended to rule out distant foci of infection.  Methicillin  resistant Staphylococcus aureus, Patient may be an isolation risk.         No results found for: URINECX  No results found for: WOUNDCX  No results found for: STOOLCX  Results from last 7 days   Lab Units 11/26/22  0139 11/24/22  0112 11/23/22  0725   CRP mg/dL 5.45* 6.62* 9.45*       I have personally looked at the labs and they are summarized above.  ----------------------------------------------------------------------------------------------------------------------  Detailed radiology reports for the last 24 hours:    Imaging Results (Last 24 Hours)     ** No results found for the last 24 hours. **        Final impressions for the last 30 days of radiology reports:    Adult Transesophageal Echo (TYRESE) W/ Cont if Necessary Per Protocol    Addendum Date: 11/4/2022    •  Indication for TYRESE -to rule out infective endocarditis in a patient with MRSA bacteremia. •  Findings- •  Left ventricular systolic function is normal. Estimated left ventricular EF = 60% •  Left atrial appendage is well visualized and is free of thrombus. •  Saline test was negative for the evidence of shunt in interatrial septum. •  The tricuspid valve appeared normal in structure. There was a moderate sized echodense structure seen above  the posterior tricuspid leaflet but not attached to the leaflet. The appearance and location are not typical for regurgitation. This structure could be a normal variant. No evidence of tricuspid valve stenosis or significant regurgitations. •  Other valves also appear normal in structure with no evidence of stenosis or significant regurgitations.  No vegetations seen on these valves. •  There is no evidence of pericardial effusion. •  Comment- •  In view of presence of MRSA bacteremia, recommend to extend antibiotic therapy for possible infective endocarditis and repeat TYRESE in 3 months.     Addendum Date: 11/3/2022    •  Indication for TYRESE -to rule out infective endocarditis in a patient with MRSA bacteremia. •   Findings- •  Left ventricular systolic function is normal. Estimated left ventricular EF = 60% •  Left atrial appendage is well visualized and is free of thrombus. •  Saline test was negative for the evidence of shunt in interatrial septum. •  The tricuspid valve appeared normal in structure. There was a moderate sized echodense structure seen above  the posterior tricuspid leaflet but not attached to the leaflet. The appearance and location are not typical for regurgitation. This structure could be a normal variant. No evidence of tricuspid valve stenosis or significant regurgitations. •  Other valves also appear normal in structure with no evidence of stenosis or significant regurgitations.  No vegetations seen on these valves. •  There is no evidence of pericardial effusion. •  Comment- •  In view of presence of MRSA bacteremia, recommend to extend antibiotic therapy for infective endocarditis and repeat TYRESE in 3 months.     Addendum Date: 11/3/2022    •  Indication for TYRESE -to rule out infective endocarditis in a patient with MRSA bacteremia. •  Findings- •  Left ventricular systolic function is normal. Estimated left ventricular EF = 60% •  Left atrial appendage is well visualized and is free of thrombus. •  Saline test was negative for the evidence of shunt in interatrial septum. •  The tricuspid valve appeared normal in structure. There was a moderate sized echodense structure seen above  the posterior tricuspid leaflet but not attached to the leaflet. The appearance and location are not typical for regurgitation. This structure could be a normal variant. No evidence of tricuspid valve stenosis or significant regurgitations. •  Other valves also appear normal in structure with no evidence of stenosis or significant regurgitations.  No vegetations seen on these valves. •  There is no evidence of pericardial effusion.     Addendum Date: 11/3/2022    •  Indication for TYRESE -to rule out infective endocarditis in a  patient with MRSA bacteremia. •  Findings- •  Left ventricular systolic function is normal. Estimated left ventricular EF = 60% •  Left atrial appendage is well visualized and is free of thrombus. •  Saline test was negative for the evidence of shunt in interatrial septum. •  The tricuspid valve appeared normal in structure. There was a moderate sized echodense structure seen in both the posterior tricuspid leaflet but not attached to the leaflet. The appearance and location are not typical for regurgitation. This structure could be a normal variant. No evidence of tricuspid valve stenosis or significant regurgitations. •  Other valves also appear normal in structure with no evidence of stenosis or significant regurgitations.  No vegetations seen on these valves. •  There is no evidence of pericardial effusion.     CT knee right wo contrast    Result Date: 11/17/2022  1.  Large knee joint effusion again noted. Suprapatellar region collection appears slightly larger now measuring 10.3 x 3.3 cm in axial dimension and was 8.8 x 2.8 cm. The collection again shows heterogeneous mixed density. 2.  Increasing lytic lucency of the lateral tibial plateau could represent osteomyelitis.  This report was finalized on 11/17/2022 1:15 PM by Dr. Oswaldo Brown MD.      XR Chest 1 View    Result Date: 11/21/2022  Redemonstrated elevation of the right hemidiaphragm with mild right basilar atelectasis/infiltrate. Signer Name: Esa Sanchez MD  Signed: 11/21/2022 12:22 AM  Workstation Name: [email or web address]    US Venous Doppler Lower Extremity Right (duplex)    Result Date: 11/12/2022  No deep venous thrombus in the right lower extremity. Signer Name: Thomas Day MD  Signed: 11/12/2022 11:34 AM  Workstation Name: [email or web address]  [facility]    US Venous Doppler Upper Extremity Bilateral (duplex)    Result Date: 11/12/2022  Negative examination.  No evidence of bilateral upper  extremity venous thrombosis. Signer Name: Thomas Day MD  Signed: 11/12/2022 11:33 AM  Workstation [facility]: [email or web address]  [facility]    I have personally looked at the radiology images and read the final radiology report.    Assessment & Plan    Right knee septic arthritis with possible osteomyelitis--patient is planned to go to the OR on Wednesday of this week with his orthopedist in [place] for antibiotic spacer placement.  Patient is actually going up today for preop evaluation.    MRSA bacteremia/endocarditis patient has been continued on daptomycin per ID instructions.    Debility--patient requires contact-guard to standby assist for transfers.  Ambulated 60 feet with front wheel walker and supervision to standby assist last week.  Continues to work on motor skills to improve functional mobility.    Orthostatic hypotension much improved    Diabetes mellitus reasonably well-controlled continue current treatment    Hypothyroidism continue Synthroid    VTE Prophylaxis:   Mechanical Order History:      Ordered        11/17/22 1442  Place Sequential Compression Device  Once            11/17/22 1442  Maintain Sequential Compression Device  Continuous                    Pharmalogical Order History:      Ordered     Dose Route Frequency Stop    11/18/22 1522  fondaparinux (ARIXTRA) injection 2.5 mg  Status:  Discontinued         2.5 mg SC Every 24 Hours Scheduled 11/28/22 0713    10/28/22 1742  fondaparinux (ARIXTRA) injection 2.5 mg  Status:  Discontinued         2.5 mg SC Every 24 Hours Scheduled 11/17/22 1442                    Joe Mike MD  [facility]  11/28/22  09:45 EST

## 2022-11-28 NOTE — PROGRESS NOTES
PROGRESS NOTE         Patient Identification:  Name:  Melchor Hardwick III  Age:  57 y.o.  Sex:  male  :  1965  MRN:  [id number]  Visit Number:  [id number]  Primary Care Provider:  Missy Up APRN         LOS: 31 days       ----------------------------------------------------------------------------------------------------------------------  Subjective       Chief Complaints:    No chief complaint on file.        Interval History:      Patient resting in bed this morning.  Reports minimal knee discomfort.  Preop appointment planned for today in [place].  Currently on room air with no apparent distress.  Afebrile, denies diarrhea.    Review of Systems:    Constitutional: no fever, chills and night sweats.  Generalized fatigue.  Eyes: no eye drainage, itching or redness.  HEENT: no mouth sores, dysphagia or nose bleed.  Respiratory: no for shortness of breath, cough or production of sputum.  Cardiovascular: no chest pain, no palpitations, no orthopnea.  Gastrointestinal: no nausea, vomiting or diarrhea. No abdominal pain, hematemesis or rectal bleeding.  Genitourinary: no dysuria or polyuria.  Hematologic/lymphatic: no lymph node abnormalities, no easy bruising or easy bleeding.  Musculoskeletal: no muscle or joint pain.  Right knee pain, swelling, improving.  Skin: No rash and no itching.  Neurological: no loss of consciousness, no seizure, no headache.  Behavioral/Psych: no depression or suicidal ideation.  Endocrine: no hot flashes.  Immunologic: negative.    ----------------------------------------------------------------------------------------------------------------------      Objective       Current [facility] Meds:  ascorbic acid, 500 mg, Oral, Daily  DAPTOmycin, 6 mg/kg (Adjusted), Intravenous, Q24H  Diclofenac Sodium, 4 g, Topical, 4x Daily  docusate sodium, 100 mg, Oral, BID  fludrocortisone, 50 mcg, Oral, Daily  Insulin Aspart, 0-14 Units, Subcutaneous, TID AC  Insulin Aspart, 4  Units, Subcutaneous, TID AC  insulin detemir, 17 Units, Subcutaneous, Q12H  iron polysaccharides, 150 mg, Oral, Daily  levothyroxine, 25 mcg, Oral, Q AM  lidocaine PF 2%, 10 mL, Injection, Once  midodrine, 2.5 mg, Oral, TID AC  multivitamin with minerals, 1 tablet, Oral, Daily  pantoprazole, 40 mg, Oral, Q AM  polyethylene glycol, 17 g, Oral, Daily  pregabalin, 100 mg, Oral, Q12H      Pharmacy Consult,       ----------------------------------------------------------------------------------------------------------------------    Vital Signs:  Temp:  [98 °F (36.7 °C)-98.1 °F (36.7 °C)] 98.1 °F (36.7 °C)  Heart Rate:  [73-80] 73  Resp:  [16-18] 18  BP: (113-144)/(64-80) 130/77  No data found.  SpO2 Percentage    11/26/22 1900 11/27/22 0910 11/27/22 1900   SpO2: 96% 96% 96%     SpO2:  [96 %] 96 %  on   ;   Device (Oxygen Therapy): room air    Body mass index is 28.34 kg/m².  Wt Readings from Last 3 Encounters:   11/01/22 89.6 kg (197 lb 8.5 oz)   09/23/22 98 kg (216 lb)   06/17/22 115 kg (254 lb)        Intake/Output Summary (Last 24 hours) at 11/28/2022 0856  Last data filed at 11/28/2022 0500  Gross per 24 hour   Intake 1200 ml   Output 3300 ml   Net -2100 ml     Diet: Texture: Regular Texture (IDDSI 7); Fluid Consistency: Regular Consistency; Diabetic Diets; Consistent Carbohydrate  ----------------------------------------------------------------------------------------------------------------------      Physical Exam:    Constitutional: Chronically ill-appearing male is resting in bed.   HENT:  Head: Normocephalic and atraumatic.  Mouth:  Moist mucous membranes.    Cardiovascular:  Normal rate, regular rhythm and normal heart sounds with no murmur. No edema.  Pulmonary/Chest:  No respiratory distress, no wheezes, no crackles, with normal breath sounds and good air movement.  Abdominal:  Soft.  Bowel sounds are normal.  No distension and no tenderness.   Musculoskeletal:  No edema, no tenderness, and no deformity.   No swelling or redness of joints.  Neurological:  Alert and oriented to person, place, and time.  No facial droop.  No slurred speech.   Skin:  Skin is warm and dry.  No rash noted.  No pallor.  Right knee surgical incision scar. Right knee with improving edema, pain, warmth and redness.  Psychiatric:  Normal mood and affect.  Behavior is normal.    ----------------------------------------------------------------------------------------------------------------------  Results from last 7 days   Lab Units 11/26/22 0139   CK TOTAL U/L 26           Results from last 7 days   Lab Units 11/26/22 0139 11/24/22 0112 11/23/22  0725   CRP mg/dL 5.45* 6.62* 9.45*   WBC 10*3/mm3 4.20 3.59 3.27*   HEMOGLOBIN g/dL 8.7* 8.9* 10.5*   HEMATOCRIT % 27.3* 29.1* 33.2*   MCV fL 81.7 82.2 82.4   MCHC g/dL 31.9 30.6* 31.6   PLATELETS 10*3/mm3 111* 128* 141     Results from last 7 days   Lab Units 11/26/22 0139 11/24/22 0112 11/23/22  0725   SODIUM mmol/L 133* 135* 133*   POTASSIUM mmol/L 3.9 3.9 3.6   CHLORIDE mmol/L 100 101 97*   CO2 mmol/L 26.2 25.7 25.7   BUN mg/dL 11 10 12   CREATININE mg/dL 0.69* 0.69* 0.68*   CALCIUM mg/dL 8.0* 8.4* 8.8   GLUCOSE mg/dL 227* 97 121*   ALBUMIN g/dL 2.31* 2.34*  --    BILIRUBIN mg/dL 0.3 0.4  --    ALK PHOS U/L 235* 249*  --    AST (SGOT) U/L 30 32  --    ALT (SGPT) U/L 23 22  --    Estimated Creatinine Clearance: 133 mL/min (A) (by C-G formula based on SCr of 0.69 mg/dL (L)).  No results found for: AMMONIA    Glucose   Date/Time Value Ref Range Status   11/28/2022 0628 173 (H) 70 - 130 mg/dL Final     Comment:     Meter: [id number] : [id number] YAMILET GAMA   11/27/2022 2014 231 (H) 70 - 130 mg/dL Final     Comment:     Meter: [id number] : [id number] YAMILET GAMA   11/27/2022 1702 189 (H) 70 - 130 mg/dL Final     Comment:     Meter: [id number] : [id number] mark torres   11/27/2022 1122 169 (H) 70 - 130 mg/dL Final     Comment:     Meter: [id number] : [id number] mark  melissa   11/27/2022 0925 182 (H) 70 - 130 mg/dL Final     Comment:     Meter: [id number] : [id number] Steve Yancey   11/27/2022 0614 90 70 - 130 mg/dL Final     Comment:     Meter: [id number] : [id number] James Wilkinson   11/26/2022 1951 223 (H) 70 - 130 mg/dL Final     Comment:     Meter: [id number] : [id number] YAMILET GAMA   11/26/2022 1634 218 (H) 70 - 130 mg/dL Final     Comment:     Meter: [id number] : [id number] lisa ramirez     Lab Results   Component Value Date    HGBA1C 8.80 (H) 09/20/2022     Lab Results   Component Value Date    TSH 2.390 10/10/2022    FREET4 0.87 (L) 10/10/2022       Blood Culture   Date Value Ref Range Status   10/24/2022 No growth at 5 days  Final   10/24/2022 No growth at 5 days  Final     No results found for: URINECX  No results found for: WOUNDCX  No results found for: STOOLCX  No results found for: RESPCX  Pain Management Panel     Pain Management Panel Latest Ref Rng & Units 5/24/2022 5/11/2022    CREATININE UR mg/dL 91.0 -    AMPHETAMINES SCREEN, URINE Negative - Negative    BARBITURATES SCREEN Negative - Negative    BENZODIAZEPINE SCREEN, URINE Negative - Negative    BUPRENORPHINEUR Negative - Negative    COCAINE SCREEN, URINE Negative - Negative    METHADONE SCREEN, URINE Negative - Negative    METHAMPHETAMINEUR Negative - Negative            ----------------------------------------------------------------------------------------------------------------------  Imaging Results (Last 24 Hours)     ** No results found for the last 24 hours. **          ----------------------------------------------------------------------------------------------------------------------    Pertinent Infectious Disease Results    COVID-19 PCR from 10/28/2022 negative.  Blood cultures from 10/18/2022 2 out of 2 sets positive for MRSA.  Blood cultures from 10/20/2022 2 out of 2 sets positive for MRSA.  Blood cultures from 10/22/2022 1 out of 2 sets positive for MRSA.   Blood cultures from 10/24/2022 finalized as no growth. Blood cultures on 11/12/2022 finalized as no growth. 2D echo from 10/24/2022 reports no evidence of vegetation.  Updated TYRESE on 11/1/2022 showed a moderate sized echodense structure on the posterior tricuspid leaflet but not attached to the leaflet.  The appearance and location are not typical for regurgitation.  This structure could be a normal variant.  No evidence of tricuspid valve stenosis or significant regurgitations.  Other valves also appear normal in structure with no evidence of stenosis or significant regurgitations.  No vegetation seen on these valves. Right lower extremity venous duplex negative for DVT.  Bilateral upper extremity venous duplex negative for DVT.     CT right knee without contrast on 11/17/2022 showed a large joint effusion again.  Suprapatellar region collection appears slightly larger now measuring 10.3 x 3.3 cm in axial dimension and was 8.8 x 2.8 cm.  The collection again shows heterogenous mixed density.  Increasing lytic lucency at the lateral tibial plateau could represent osteomyelitis.  Blood cultures on 11/12/2022 finalized as no growth.    CPK on 11/20/2022 was normal at 23.  Urinalysis on 11/21/2022 was negative.  Chest x-ray on 11/21/2022 showed redemonstrated elevation of the right hemidiaphragm with mild right basilar atelectasis/infiltrate.    2 out of 2 blood cultures on 11/20/2022 are so far showing MRSA.  2 out of 2 blood cultures on 11/22/2022 is so far showing MRSA.  Blood cultures from 11/23/2022 2 out of 2 sets show no growth thus far.    Assessment/Plan       Assessment     Right knee septic arthritis  MRSA bacteremia  Possible endocarditis    Plan      I saw and examined the patient myself this morning and discussed my plan of care with ROSALINA Caballero and primary RN and here are my findings:      Patient is resting comfortably in bed with no acute distress has less knee pain with no fever or diarrhea  reported overnight.  Continues to have minimal knee discomfort but the edema seems to have subsidized to slightly improved with less warmth and less erythema.    The rest of his exam is fairly unremarkable with lungs clear to auscultation without any crackles wheezing or rhonchi and abdomen is soft.    Patient is going for his preop appointment today in [place] with plan to proceed with his surgical washout of the knee on Wednesday with postop recovery and to come back to rehab afterwards.  Patient is currently off ceftaroline and to continue with daptomycin monotherapy as his bacteremia has cleared.      Code Status:   Code Status and Medical Interventions:   Ordered at: 11/03/22 1015     Level Of Support Discussed With:    Patient     Code Status (Patient has no pulse and is not breathing):    CPR (Attempt to Resuscitate)     Medical Interventions (Patient has pulse or is breathing):    Full Support     Release to patient:    Routine Release       ROSALINA Caballero  11/28/22  08:56 EST       Electronically signed by ROSALINA Caballero, 11/28/22, 8:57 AM EST.    Electronically signed by Marilynn Giordano MD, 11/28/22, 10:13 AM EST.

## 2022-11-28 NOTE — PROGRESS NOTES
Occupational Therapy:    Physical Therapy: Individual: 50 minutes.    Speech Language Pathology:    Signed by: Michi Mckeon PTA

## 2022-11-29 ENCOUNTER — ANESTHESIA EVENT (OUTPATIENT)
Dept: PERIOP | Facility: HOSPITAL | Age: 57
End: 2022-11-29

## 2022-11-29 VITALS
DIASTOLIC BLOOD PRESSURE: 55 MMHG | SYSTOLIC BLOOD PRESSURE: 102 MMHG | BODY MASS INDEX: 28.28 KG/M2 | HEART RATE: 78 BPM | TEMPERATURE: 98.4 F | OXYGEN SATURATION: 95 % | WEIGHT: 197.53 LBS | RESPIRATION RATE: 18 BRPM | HEIGHT: 70 IN

## 2022-11-29 LAB
FRUCTOSAMINE SERPL-SCNC: 323 UMOL/L (ref 0–285)
GLUCOSE BLDC GLUCOMTR-MCNC: 126 MG/DL (ref 70–130)
GLUCOSE BLDC GLUCOMTR-MCNC: 140 MG/DL (ref 70–130)
GLUCOSE BLDC GLUCOMTR-MCNC: 180 MG/DL (ref 70–130)

## 2022-11-29 PROCEDURE — 97116 GAIT TRAINING THERAPY: CPT

## 2022-11-29 PROCEDURE — 97110 THERAPEUTIC EXERCISES: CPT

## 2022-11-29 PROCEDURE — 97110 THERAPEUTIC EXERCISES: CPT | Performed by: OCCUPATIONAL THERAPIST

## 2022-11-29 PROCEDURE — 63710000001 INSULIN ASPART PER 5 UNITS: Performed by: INTERNAL MEDICINE

## 2022-11-29 PROCEDURE — 63710000001 INSULIN DETEMIR PER 5 UNITS: Performed by: INTERNAL MEDICINE

## 2022-11-29 PROCEDURE — 97530 THERAPEUTIC ACTIVITIES: CPT

## 2022-11-29 PROCEDURE — 99238 HOSP IP/OBS DSCHRG MGMT 30/<: CPT | Performed by: FAMILY MEDICINE

## 2022-11-29 PROCEDURE — 99308 SBSQ NF CARE LOW MDM 20: CPT | Performed by: INTERNAL MEDICINE

## 2022-11-29 PROCEDURE — 25010000002 DAPTOMYCIN PER 1 MG: Performed by: FAMILY MEDICINE

## 2022-11-29 PROCEDURE — 97530 THERAPEUTIC ACTIVITIES: CPT | Performed by: OCCUPATIONAL THERAPIST

## 2022-11-29 PROCEDURE — 97535 SELF CARE MNGMENT TRAINING: CPT | Performed by: OCCUPATIONAL THERAPIST

## 2022-11-29 PROCEDURE — 82962 GLUCOSE BLOOD TEST: CPT

## 2022-11-29 RX ORDER — CYCLOBENZAPRINE HCL 5 MG
5 TABLET ORAL 3 TIMES DAILY PRN
Start: 2022-11-29

## 2022-11-29 RX ORDER — LEVOTHYROXINE SODIUM 0.03 MG/1
25 TABLET ORAL
Start: 2022-11-30 | End: 2023-01-17

## 2022-11-29 RX ORDER — INSULIN ASPART 100 [IU]/ML
4 INJECTION, SOLUTION INTRAVENOUS; SUBCUTANEOUS
Refills: 12
Start: 2022-11-29

## 2022-11-29 RX ORDER — FAMOTIDINE 10 MG/ML
20 INJECTION, SOLUTION INTRAVENOUS ONCE
Status: CANCELLED | OUTPATIENT
Start: 2022-11-29 | End: 2022-11-29

## 2022-11-29 RX ORDER — ASCORBIC ACID 500 MG
500 TABLET ORAL DAILY
Start: 2022-11-30

## 2022-11-29 RX ORDER — BISACODYL 10 MG
10 SUPPOSITORY, RECTAL RECTAL DAILY PRN
Start: 2022-11-29

## 2022-11-29 RX ORDER — PSEUDOEPHEDRINE HCL 30 MG
100 TABLET ORAL 2 TIMES DAILY
Start: 2022-11-29

## 2022-11-29 RX ORDER — FLUDROCORTISONE ACETATE 0.1 MG/1
0.05 TABLET ORAL DAILY
Start: 2022-11-30

## 2022-11-29 RX ORDER — ACETAMINOPHEN 325 MG/1
650 TABLET ORAL EVERY 4 HOURS PRN
Start: 2022-11-29

## 2022-11-29 RX ORDER — PANTOPRAZOLE SODIUM 40 MG/1
40 TABLET, DELAYED RELEASE ORAL
Start: 2022-11-30

## 2022-11-29 RX ORDER — HYDROCODONE BITARTRATE AND ACETAMINOPHEN 5; 325 MG/1; MG/1
1 TABLET ORAL EVERY 8 HOURS PRN
Refills: 0 | Status: ON HOLD
Start: 2022-11-29 | End: 2022-12-15

## 2022-11-29 RX ORDER — PREGABALIN 100 MG/1
100 CAPSULE ORAL EVERY 12 HOURS SCHEDULED
Start: 2022-11-29

## 2022-11-29 RX ORDER — INSULIN ASPART 100 [IU]/ML
0-14 INJECTION, SOLUTION INTRAVENOUS; SUBCUTANEOUS
Refills: 12
Start: 2022-11-29

## 2022-11-29 RX ORDER — POLYETHYLENE GLYCOL 3350 17 G/17G
17 POWDER, FOR SOLUTION ORAL DAILY
Start: 2022-11-30

## 2022-11-29 RX ADMIN — DAPTOMYCIN 500 MG: 500 INJECTION, POWDER, LYOPHILIZED, FOR SOLUTION INTRAVENOUS at 18:20

## 2022-11-29 RX ADMIN — CARBIDOPA AND LEVODOPA 2.5 MG: 50; 200 TABLET, EXTENDED RELEASE ORAL at 06:31

## 2022-11-29 RX ADMIN — FLUDROCORTISONE ACETATE 50 MCG: 0.1 TABLET ORAL at 08:33

## 2022-11-29 RX ADMIN — HYDROCODONE BITARTRATE AND ACETAMINOPHEN 1 TABLET: 5; 325 TABLET ORAL at 05:09

## 2022-11-29 RX ADMIN — INSULIN ASPART 3 UNITS: 100 INJECTION, SOLUTION INTRAVENOUS; SUBCUTANEOUS at 12:12

## 2022-11-29 RX ADMIN — PREGABALIN 100 MG: 100 CAPSULE ORAL at 21:17

## 2022-11-29 RX ADMIN — OXYCODONE HYDROCHLORIDE AND ACETAMINOPHEN 500 MG: 500 TABLET ORAL at 08:33

## 2022-11-29 RX ADMIN — POLYETHYLENE GLYCOL (3350) 17 G: 17 POWDER, FOR SOLUTION ORAL at 08:33

## 2022-11-29 RX ADMIN — INSULIN ASPART 4 UNITS: 100 INJECTION, SOLUTION INTRAVENOUS; SUBCUTANEOUS at 08:41

## 2022-11-29 RX ADMIN — LEVOTHYROXINE SODIUM 25 MCG: 0.07 TABLET ORAL at 05:09

## 2022-11-29 RX ADMIN — Medication 1 TABLET: at 08:33

## 2022-11-29 RX ADMIN — CARBIDOPA AND LEVODOPA 2.5 MG: 50; 200 TABLET, EXTENDED RELEASE ORAL at 17:13

## 2022-11-29 RX ADMIN — MUPIROCIN 1 APPLICATION: 20 OINTMENT TOPICAL at 21:17

## 2022-11-29 RX ADMIN — CARBIDOPA AND LEVODOPA 2.5 MG: 50; 200 TABLET, EXTENDED RELEASE ORAL at 12:16

## 2022-11-29 RX ADMIN — INSULIN ASPART 4 UNITS: 100 INJECTION, SOLUTION INTRAVENOUS; SUBCUTANEOUS at 17:13

## 2022-11-29 RX ADMIN — INSULIN DETEMIR 5 UNITS: 100 INJECTION, SOLUTION SUBCUTANEOUS at 21:16

## 2022-11-29 RX ADMIN — PREGABALIN 100 MG: 100 CAPSULE ORAL at 08:41

## 2022-11-29 RX ADMIN — INSULIN DETEMIR 17 UNITS: 100 INJECTION, SOLUTION SUBCUTANEOUS at 08:41

## 2022-11-29 RX ADMIN — MUPIROCIN 1 APPLICATION: 20 OINTMENT TOPICAL at 08:41

## 2022-11-29 RX ADMIN — CYCLOBENZAPRINE 5 MG: 10 TABLET, FILM COATED ORAL at 05:09

## 2022-11-29 RX ADMIN — INSULIN ASPART 4 UNITS: 100 INJECTION, SOLUTION INTRAVENOUS; SUBCUTANEOUS at 12:12

## 2022-11-29 RX ADMIN — PANTOPRAZOLE SODIUM 40 MG: 40 TABLET, DELAYED RELEASE ORAL at 05:09

## 2022-11-29 RX ADMIN — Medication 150 MG: at 08:33

## 2022-11-29 NOTE — SIGNIFICANT NOTE
11/29/22 0925   Plan   Plan Team conference held today.  Pt is scheduled for surgery at [facility] on 11-30-22 arrive at 7:30 am, therefore, will be discharged on this date.  Spoke to pt about plans for discharge to [facility] on 11-30-22 for surgery to have an antibiotic spacer.  Pt says his sister Ros is suppose to come to rehab at 5:00 am on 11-30-22 to transport pt to [facility].   Pt will be discharged from rehab and admitted to [facility] on 11-30-22.   Patient/Family in Agreement with Plan yes

## 2022-11-29 NOTE — DISCHARGE INSTRUCTIONS
Go to hospital on Wednesday am for your surgery appointment. Follow up per your surgeons orders post surgery.

## 2022-11-29 NOTE — THERAPY TREATMENT NOTE
Inpatient Rehabilitation - Occupational Therapy Treatment Note    YVONNE Gaines     Patient Name: Melchor Hardwick III  : 1965  MRN: [id number]    Today's Date: 2022                 Admit Date: 10/28/2022         ICD-10-CM ICD-9-CM   1. Staphylococcal arthritis of right knee (HCC)  M00.061 711.06     041.10   2. History of osteomyelitis  Z87.39 V13.59       Patient Active Problem List   Diagnosis   • Hyperlipidemia   • Hypertensive disorder   • Obesity   • Type 2 diabetes mellitus with hyperglycemia, with long-term current use of insulin (HCC)   • Gas gangrene of foot (HCC)   • Cellulitis in diabetic foot (HCC)   • Other acute osteomyelitis of left foot (HCC)   • Osteomyelitis (HCC)   • Critical illness myopathy   • Staphylococcal arthritis of right knee (HCC)   • Other cirrhosis of liver (HCC)   • MRSA bacteremia   • Endocarditis of tricuspid valve   • Wound of right buttock   • History of osteomyelitis   • Debility       Past Medical History:   Diagnosis Date   • Diabetes mellitus (HCC)     4 times per day gets checked for sugar at rehab   • Dizzy    • Hyperlipidemia    • Hypertension    • MRSA infection     blood -      • Orthostatic hypotension    • Pressure sore on buttocks     top crack -  sees wound - but now rn take care of it at rehab - minor opening - dime size - pat nurse didnt assess-  pt states getting better   • Pyogenic arthritis of right knee joint, due to unspecified organism (HCC) 2022   • Sepsis (HCC)    • Wears glasses     readers       Past Surgical History:   Procedure Laterality Date   • ABSCESS DRAINAGE  2004    PERINEUM   • AMPUTATION FOOT Left 2022    Procedure: AMPUTATION FOOT;  Surgeon: Addison Colby MD;  Location: [facility] OR;  Service: Podiatry;  Laterality: Left;   • INCISION AND DRAINAGE LEG Left 05/10/2022    Procedure: INCISION AND DRAINAGE LOWER EXTREMITY;  Surgeon: Addison Colby MD;  Location: [facility] OR;  Service: Podiatry;  Laterality: Left;   • KNEE  INCISION AND DRAINAGE Right 09/21/2022    Procedure: KNEE INCISION AND DRAINAGE RIGHT;  Surgeon: Brain Bentley MD;  Location:  [facility] OR;  Service: Orthopedics;  Laterality: Right;   • PERIPHERALLY INSERTED CENTRAL CATHETER INSERTION Left    • WOUND DEBRIDEMENT Left 05/13/2022    Procedure: DEBRIDEMENT FOOT;  Surgeon: Addison Colby MD;  Location:  [facility] OR;  Service: Podiatry;  Laterality: Left;             IRF OT ASSESSMENT FLOWSHEET (last 12 hours)     IRF OT Evaluation and Treatment     Row Name 11/29/22 1400          OT Time and Intention    Document Type daily treatment  -     Mode of Treatment individual therapy;occupational therapy  -     Patient Effort good  -     Row Name 11/29/22 1400          General Information    Patient/Family/Caregiver Comments/Observations patient agreeable to therapy. patient to be discharged tomorrow am for procedure at outside facility.  -     Existing Precautions/Restrictions fall  -     Row Name 11/29/22 1400          Cognition/Psychosocial    Affect/Mental Status (Cognition) WFL  -     Row Name 11/29/22 1400          Lower Body Dressing    [place] Level (Lower Body Dressing) don;pants/bottoms;contact guard assist;minimum assist (75% patient effort)  -     Comment (Lower Body Dressing) max/dep foot brace  -     Row Name 11/29/22 1400          Bed-Chair Transfer    Bed-Chair [place] (Transfers) contact guard;verbal cues;supervision  -     Row Name 11/29/22 1400          Motor Skills    Motor Skills coordination;functional endurance  -     Therapeutic Exercise --  BUE ROM,, UE PRE, rickshaw,  strength, dowel ex, flex bar, gmc,fmc  -           User Key  (r) = Recorded By, (t) = Taken By, (c) = Cosigned By    Initials Name Effective Dates     Jazmin Goldman, OT 06/16/21 -                  Occupational Therapy Education     Title: PT OT SLP Therapies (Done)     Topic: Occupational Therapy (Done)     Point: ADL training (Done)      Description:   Instruct learner(s) on proper safety adaptation and remediation techniques during self care or transfers.   Instruct in proper use of assistive devices.              Learning Progress Summary           Patient Acceptance, E,TB, VU by DL at 11/24/2022 0154    Eager, E,TB,D, DU,NR by JW at 11/23/2022 1322    Acceptance, E,TB, VU by DL at 11/23/2022 0029    Acceptance, E,TB, VU by DL at 11/22/2022 0442    Acceptance, E,TB, VU by DL at 11/22/2022 0429    Acceptance, E, VU,NR by HB at 11/18/2022 1211    Acceptance, E,TB,D, VU,DU,NR by LA at 11/17/2022 1423    Acceptance, E,TB, VU by DL at 11/17/2022 0233    Acceptance, E, VU,NR by HB at 11/16/2022 1425    Acceptance, E,TB, VU by DL at 11/15/2022 2033    Acceptance, E, VU,NR by HB at 11/15/2022 1242    Acceptance, E,TB, VU by DL at 11/15/2022 0305    Acceptance, E,TB,D, VU,DU,NR by LA at 11/11/2022 1248    Acceptance, E, VU,NR by BF at 11/10/2022 1431    Acceptance, E, VU,NR by HB at 11/9/2022 1230    Acceptance, E,TB, VU by DG at 11/9/2022 0050    Acceptance, E, VU,NR by HB at 11/8/2022 1426    Acceptance, E,TB, VU by DG at 11/8/2022 0114    Acceptance, E, VU,NR by HB at 11/7/2022 1155    Acceptance, E,TB, VU by DG at 11/6/2022 2023    Acceptance, E, VU,NR by HB at 11/4/2022 1153    Acceptance, E, VU,NR by HB at 11/3/2022 1428    Acceptance, E,TB, VU by DG at 11/1/2022 2016    Acceptance, E,TB, VU by DG at 10/31/2022 2315    Acceptance, E, VU,NR by BF at 10/31/2022 1429    Acceptance, E,TB, VU by DL at 10/31/2022 0101    Acceptance, E,TB, VU by DL at 10/29/2022 2321    Acceptance, E, VU,NR by  at 10/29/2022 1137                   Point: Home exercise program (Done)     Description:   Instruct learner(s) on appropriate technique for monitoring, assisting and/or progressing therapeutic exercises/activities.              Learning Progress Summary           Patient Acceptance, E,TB, VU by DL at 11/24/2022 0154    Eager, YANELY,TB,D, DU,NR by  at 11/23/2022 1322     Acceptance, E,TB, VU by DL at 11/23/2022 0029    Acceptance, E,TB, VU by DL at 11/22/2022 0442    Acceptance, E,TB, VU by DL at 11/22/2022 0429    Acceptance, E, VU,NR by HB at 11/18/2022 1211    Acceptance, E,TB,D, VU,DU,NR by LA at 11/17/2022 1423    Acceptance, E,TB, VU by DL at 11/17/2022 0233    Acceptance, E, VU,NR by HB at 11/16/2022 1425    Acceptance, E,TB, VU by DL at 11/15/2022 2033    Acceptance, E, VU,NR by HB at 11/15/2022 1242    Acceptance, E,TB, VU by DL at 11/15/2022 0305    Acceptance, E,TB,D, VU,DU,NR by LA at 11/11/2022 1248    Acceptance, E, VU,NR by BF at 11/10/2022 1431    Acceptance, E, VU,NR by HB at 11/9/2022 1230    Acceptance, E,TB, VU by DG at 11/9/2022 0050    Acceptance, E, VU,NR by HB at 11/8/2022 1426    Acceptance, E,TB, VU by DG at 11/8/2022 0114    Acceptance, E, VU,NR by HB at 11/7/2022 1155    Acceptance, E,TB, VU by DG at 11/6/2022 2023    Acceptance, E, VU,NR by HB at 11/4/2022 1153    Acceptance, E, VU,NR by HB at 11/3/2022 1428    Acceptance, E,TB, VU by DG at 11/1/2022 2016    Acceptance, E,TB, VU by DG at 10/31/2022 2315    Acceptance, E,TB, VU by DL at 10/31/2022 0101    Acceptance, E,TB, VU by DL at 10/29/2022 2321    Acceptance, E, VU,NR by HB at 10/29/2022 1137                   Point: Precautions (Done)     Description:   Instruct learner(s) on prescribed precautions during self-care and functional transfers.              Learning Progress Summary           Patient Acceptance, E,TB, VU by DL at 11/24/2022 0154    Eager, E,TB,D, DU,NR by JW at 11/23/2022 1322    Acceptance, E,TB, VU by DL at 11/23/2022 0029    Acceptance, E,TB, VU by DL at 11/22/2022 0442    Acceptance, E,TB, VU by DL at 11/22/2022 0429    Acceptance, E, VU,NR by HB at 11/18/2022 1211    Acceptance, E,TB,D, VU,DU,NR by LA at 11/17/2022 1423    Acceptance, E,TB, VU by DL at 11/17/2022 0233    Acceptance, E, VU,NR by HB at 11/16/2022 1425    Acceptance, E,TB, VU by DL at 11/15/2022 2033     Acceptance, E, VU,NR by HB at 11/15/2022 1242    Acceptance, E,TB, VU by DL at 11/15/2022 0305    Acceptance, E,TB,D, VU,DU,NR by LA at 11/11/2022 1248    Acceptance, E, VU,NR by HB at 11/9/2022 1230    Acceptance, E,TB, VU by DG at 11/9/2022 0050    Acceptance, E, VU,NR by HB at 11/8/2022 1426    Acceptance, E,TB, VU by DG at 11/8/2022 0114    Acceptance, E, VU,NR by HB at 11/7/2022 1155    Acceptance, E,TB, VU by DG at 11/6/2022 2023    Acceptance, E, VU,NR by HB at 11/4/2022 1153    Acceptance, E, VU,NR by HB at 11/3/2022 1428    Acceptance, E,TB, VU by DG at 11/1/2022 2016    Acceptance, E,TB, VU by DG at 10/31/2022 2315    Acceptance, E, VU,NR by BF at 10/31/2022 1429    Acceptance, E,TB, VU by DL at 10/31/2022 0101    Acceptance, E,TB, VU by DL at 10/29/2022 2321    Acceptance, E, VU,NR by HB at 10/29/2022 1137                   Point: Body mechanics (Done)     Description:   Instruct learner(s) on proper positioning and spine alignment during self-care, functional mobility activities and/or exercises.              Learning Progress Summary           Patient Acceptance, E,TB, VU by DL at 11/24/2022 0154    Eager, E,TB,D, DU,NR by JW at 11/23/2022 1322    Acceptance, E,TB, VU by DL at 11/23/2022 0029    Acceptance, E,TB, VU by DL at 11/22/2022 0442    Acceptance, E,TB, VU by DL at 11/22/2022 0429    Acceptance, E, VU,NR by HB at 11/18/2022 1211    Acceptance, E,TB,D, VU,DU,NR by LA at 11/17/2022 1423    Acceptance, E,TB, VU by DL at 11/17/2022 0233    Acceptance, E, VU,NR by HB at 11/16/2022 1425    Acceptance, E,TB, VU by DL at 11/15/2022 2033    Acceptance, E, VU,NR by HB at 11/15/2022 1242    Acceptance, E,TB, VU by DL at 11/15/2022 0305    Acceptance, E,TB,D, VU,DU,NR by LA at 11/11/2022 1248    Acceptance, E, VU,NR by HB at 11/9/2022 1230    Acceptance, E,TB, VU by DG at 11/9/2022 0050    Acceptance, E, VU,NR by HB at 11/8/2022 1426    Acceptance, E,TB, VU by DG at 11/8/2022 0114    Acceptance, E, VU,NR by  HB at 11/7/2022 1155    Acceptance, E,TB, VU by DG at 11/6/2022 2023    Acceptance, E, VU,NR by HB at 11/4/2022 1153    Acceptance, E, VU,NR by HB at 11/3/2022 1428    Acceptance, E,TB, VU by DG at 11/1/2022 2016    Acceptance, E,TB, VU by DG at 10/31/2022 2315    Acceptance, E,TB, VU by DL at 10/31/2022 0101    Acceptance, E,TB, VU by DL at 10/29/2022 2321    Acceptance, E, VU,NR by HB at 10/29/2022 1137                               User Key     Initials Effective Dates Name Provider Type Discipline    DG 06/16/21 -  Phyllis Yañez, RN Registered Nurse Nurse     06/16/21 -  Mandi Smith, OT Occupational Therapist OT    JW 06/16/21 -  Renny Kramer OTR Occupational Therapist OT    HB 05/25/21 -  Dorothy Rosas OT Occupational Therapist OT    LA 02/14/22 -  Jaqueline Bautista OT Occupational Therapist OT    DL 03/16/22 -  Hellen Mcdonnell RN Registered Nurse Nurse                    OT Recommendation and Plan                         Time Calculation:      Time Calculation- OT     Row Name 11/29/22 1419 11/29/22 1418          Time Calculation- OT    OT Start Time 1045  - 0830  -     OT Stop Time 1130  - 0915  -     OT Time Calculation (min) 45 min  - 45 min  -           User Key  (r) = Recorded By, (t) = Taken By, (c) = Cosigned By    Initials Name Provider Type     Jazmin Goldman, OT Occupational Therapist              Therapy Charges for Today     Code Description Service Date Service Provider Modifiers Qty    05689619018 HC OT THERAPEUTIC ACT EA 15 MIN 11/29/2022 Jazmin Goldman OT GO 2    13881570820 HC OT SELF CARE/MGMT/TRAIN EA 15 MIN 11/29/2022 Jazmin Goldman OT GO 1    51200386137 HC OT THER PROC EA 15 MIN 11/29/2022 Jazmin Goldman, OT GO 3                   Jazmin Goldman OT  11/29/2022

## 2022-11-29 NOTE — PROGRESS NOTES
PROGRESS NOTE         Patient Identification:  Name:  Melchor Hardwick III  Age:  57 y.o.  Sex:  male  :  1965  MRN:  [id number]  Visit Number:  [id number]  Primary Care Provider:  Msisy Up APRN         LOS: 32 days       ----------------------------------------------------------------------------------------------------------------------  Subjective       Chief Complaints:    No chief complaint on file.        Interval History:      Patient overall doing well today.  Had preop appointment in [place] yesterday.  Plans to leave at 5 AM on Wednesday for repeat washout and after a couple days today return to rehab.  Knee overall improved, patient able to ambulate on it.  MRSA PCR from 2022 negative.    Review of Systems:    Constitutional: no fever, chills and night sweats.  Generalized fatigue.  Eyes: no eye drainage, itching or redness.  HEENT: no mouth sores, dysphagia or nose bleed.  Respiratory: no for shortness of breath, cough or production of sputum.  Cardiovascular: no chest pain, no palpitations, no orthopnea.  Gastrointestinal: no nausea, vomiting or diarrhea. No abdominal pain, hematemesis or rectal bleeding.  Genitourinary: no dysuria or polyuria.  Hematologic/lymphatic: no lymph node abnormalities, no easy bruising or easy bleeding.  Musculoskeletal: no muscle or joint pain.  Right knee pain, swelling, improving.  Skin: No rash and no itching.  Neurological: no loss of consciousness, no seizure, no headache.  Behavioral/Psych: no depression or suicidal ideation.  Endocrine: no hot flashes.  Immunologic: negative.    ----------------------------------------------------------------------------------------------------------------------      Objective       [facility] Meds:  ascorbic acid, 500 mg, Oral, Daily  DAPTOmycin, 6 mg/kg (Adjusted), Intravenous, Q24H  Diclofenac Sodium, 4 g, Topical, 4x Daily  docusate sodium, 100 mg, Oral, BID  fludrocortisone, 50 mcg, Oral,  Daily  Insulin Aspart, 0-14 Units, Subcutaneous, TID AC  Insulin Aspart, 4 Units, Subcutaneous, TID AC  insulin detemir, 17 Units, Subcutaneous, Q12H  iron polysaccharides, 150 mg, Oral, Daily  levothyroxine, 25 mcg, Oral, Q AM  lidocaine PF 2%, 10 mL, Injection, Once  midodrine, 2.5 mg, Oral, TID AC  multivitamin with minerals, 1 tablet, Oral, Daily  mupirocin, 1 application, Topical, Q12H  pantoprazole, 40 mg, Oral, Q AM  polyethylene glycol, 17 g, Oral, Daily  pregabalin, 100 mg, Oral, Q12H      Pharmacy Consult,       ----------------------------------------------------------------------------------------------------------------------    Vital Signs:  Temp:  [98 °F (36.7 °C)-98.3 °F (36.8 °C)] 98.3 °F (36.8 °C)  Heart Rate:  [75-93] 75  Resp:  [18] 18  BP: (122-144)/(69-84) 122/84  No data found.  SpO2 Percentage    11/28/22 0835 11/28/22 1600 11/28/22 1900   SpO2: 99% 99% 96%     SpO2:  [96 %-99 %] 96 %  on   ;   Device (Oxygen Therapy): room air    Body mass index is 28.34 kg/m².  Wt Readings from Last 3 Encounters:   11/01/22 89.6 kg (197 lb 8.5 oz)   11/28/22 88.9 kg (196 lb)   09/23/22 98 kg (216 lb)        Intake/Output Summary (Last 24 hours) at 11/29/2022 0728  Last data filed at 11/29/2022 0500  Gross per 24 hour   Intake 480 ml   Output 1100 ml   Net -620 ml     Diet: Texture: Regular Texture (IDDSI 7); Fluid Consistency: Regular Consistency; Diabetic Diets; Consistent Carbohydrate  ----------------------------------------------------------------------------------------------------------------------      Physical Exam:    Constitutional: Chronically ill-appearing male is resting in bed.   HENT:  Head: Normocephalic and atraumatic.  Mouth:  Moist mucous membranes.    Cardiovascular:  Normal rate, regular rhythm and normal heart sounds with no murmur. No edema.  Pulmonary/Chest:  No respiratory distress, no wheezes, no crackles, with normal breath sounds and good air movement.  Abdominal:  Soft.  Bowel  sounds are normal.  No distension and no tenderness.   Musculoskeletal:  No edema, no tenderness, and no deformity.  No swelling or redness of joints.  Neurological:  Alert and oriented to person, place, and time.  No facial droop.  No slurred speech.   Skin:  Skin is warm and dry.  No rash noted.  No pallor.  Right knee surgical incision scar. Right knee with improving edema, pain, warmth and redness.  Psychiatric:  Normal mood and affect.  Behavior is normal.    ----------------------------------------------------------------------------------------------------------------------  Results from last 7 days   Lab Units 11/26/22 0139   CK TOTAL U/L 26           Results from last 7 days   Lab Units 11/28/22  1256 11/26/22  0139 11/24/22  0112   CRP mg/dL 3.52* 5.45* 6.62*   WBC 10*3/mm3 5.90 4.20 3.59   HEMOGLOBIN g/dL 11.1* 8.7* 8.9*   HEMATOCRIT % 36.0* 27.3* 29.1*   MCV fL 82.8 81.7 82.2   MCHC g/dL 30.8* 31.9 30.6*   PLATELETS 10*3/mm3 144 111* 128*   INR  1.12  --   --      Results from last 7 days   Lab Units 11/28/22  1256 11/26/22  0139 11/24/22  0112   SODIUM mmol/L 133* 133* 135*   POTASSIUM mmol/L 3.9 3.9 3.9   CHLORIDE mmol/L 97* 100 101   CO2 mmol/L 23.0 26.2 25.7   BUN mg/dL 11 11 10   CREATININE mg/dL 0.63* 0.69* 0.69*   CALCIUM mg/dL 8.5* 8.0* 8.4*   GLUCOSE mg/dL 286* 227* 97   ALBUMIN g/dL 3.00* 2.31* 2.34*   BILIRUBIN mg/dL 0.4 0.3 0.4   ALK PHOS U/L 294* 235* 249*   AST (SGOT) U/L 46* 30 32   ALT (SGPT) U/L 27 23 22   Estimated Creatinine Clearance: 145.7 mL/min (A) (by C-G formula based on SCr of 0.63 mg/dL (L)).  No results found for: AMMONIA    Hemoglobin A1C   Date/Time Value Ref Range Status   11/28/2022 1256 7.30 (H) 4.80 - 5.60 % Final     Glucose   Date/Time Value Ref Range Status   11/29/2022 0624 126 70 - 130 mg/dL Final     Comment:     Meter: [id number] : [id number] YAMILET NATAN   11/28/2022 2037 264 (H) 70 - 130 mg/dL Final     Comment:     Meter: [id number] : [id number]  YAMILET GAMA   11/28/2022 1612 279 (H) 70 - 130 mg/dL Final     Comment:     Meter: [id number] : [id number] Ritu Frederick   11/28/2022 0628 173 (H) 70 - 130 mg/dL Final     Comment:     Meter: [id number] : [id number] YAMILET GAMA   11/27/2022 2014 231 (H) 70 - 130 mg/dL Final     Comment:     Meter: [id number] : [id number] YAMILET GAMA   11/27/2022 1702 189 (H) 70 - 130 mg/dL Final     Comment:     Meter: [id number] : [id number] mark massengill   11/27/2022 1122 169 (H) 70 - 130 mg/dL Final     Comment:     Meter: [id number] : [id number] mark massengill   11/27/2022 0925 182 (H) 70 - 130 mg/dL Final     Comment:     Meter: [id number] : [id number] Steve Yancey     Lab Results   Component Value Date    HGBA1C 7.30 (H) 11/28/2022     Lab Results   Component Value Date    TSH 2.390 10/10/2022    FREET4 0.87 (L) 10/10/2022       Blood Culture   Date Value Ref Range Status   10/24/2022 No growth at 5 days  Final   10/24/2022 No growth at 5 days  Final     No results found for: URINECX  No results found for: WOUNDCX  No results found for: STOOLCX  No results found for: RESPCX  Pain Management Panel     Pain Management Panel Latest Ref Rng & Units 5/24/2022 5/11/2022    CREATININE UR mg/dL 91.0 -    AMPHETAMINES SCREEN, URINE Negative - Negative    BARBITURATES SCREEN Negative - Negative    BENZODIAZEPINE SCREEN, URINE Negative - Negative    BUPRENORPHINEUR Negative - Negative    COCAINE SCREEN, URINE Negative - Negative    METHADONE SCREEN, URINE Negative - Negative    METHAMPHETAMINEUR Negative - Negative            ----------------------------------------------------------------------------------------------------------------------  Imaging Results (Last 24 Hours)     ** No results found for the last 24 hours. **          ----------------------------------------------------------------------------------------------------------------------    Pertinent Infectious Disease  Results    COVID-19 PCR from 10/28/2022 negative.  Blood cultures from 10/18/2022 2 out of 2 sets positive for MRSA.  Blood cultures from 10/20/2022 2 out of 2 sets positive for MRSA.  Blood cultures from 10/22/2022 1 out of 2 sets positive for MRSA.  Blood cultures from 10/24/2022 finalized as no growth. Blood cultures on 11/12/2022 finalized as no growth. 2D echo from 10/24/2022 reports no evidence of vegetation.  Updated TYRESE on 11/1/2022 showed a moderate sized echodense structure on the posterior tricuspid leaflet but not attached to the leaflet.  The appearance and location are not typical for regurgitation.  This structure could be a normal variant.  No evidence of tricuspid valve stenosis or significant regurgitations.  Other valves also appear normal in structure with no evidence of stenosis or significant regurgitations.  No vegetation seen on these valves. Right lower extremity venous duplex negative for DVT.  Bilateral upper extremity venous duplex negative for DVT.     CT right knee without contrast on 11/17/2022 showed a large joint effusion again.  Suprapatellar region collection appears slightly larger now measuring 10.3 x 3.3 cm in axial dimension and was 8.8 x 2.8 cm.  The collection again shows heterogenous mixed density.  Increasing lytic lucency at the lateral tibial plateau could represent osteomyelitis.  Blood cultures on 11/12/2022 finalized as no growth.    CPK on 11/20/2022 was normal at 23.  Urinalysis on 11/21/2022 was negative.  Chest x-ray on 11/21/2022 showed redemonstrated elevation of the right hemidiaphragm with mild right basilar atelectasis/infiltrate.    2 out of 2 blood cultures on 11/20/2022 are so far showing MRSA.  2 out of 2 blood cultures on 11/22/2022 is so far showing MRSA.  Blood cultures from 11/23/2022 2 out of 2 sets show no growth thus far.      Assessment/Plan       Assessment     Right knee septic arthritis  MRSA bacteremia  Possible endocarditis    Plan      I saw  and examined the patient myself this morning and discussed my plan of care with ROSALINA Caballero and primary RN and here are my findings:      Patient is doing well overall with no fever or diarrhea reported overnight.  He is comfortable in bed without any complaints this morning and his right knee continues to show significant improvement with no warmth and no erythema with well-healed surgical wound but continues to have pain and swelling.    Patient had a preop appointment yesterday in [place] and he is planning to leave at 5 AM on Wednesday to have his surgery at 7 AM in [place] for repeat washout and deep intraoperative cultures before coming back to rehab after couple days of postop recovery.    MRSA screen from 11/28/2022 is negative and patient continues on daptomycin monotherapy for now and seems to be stable off ceftaroline.  He is participating with physical therapy and was able to ambulate yesterday.    Code Status:   Code Status and Medical Interventions:   Ordered at: 11/03/22 1015     Level Of Support Discussed With:    Patient     Code Status (Patient has no pulse and is not breathing):    CPR (Attempt to Resuscitate)     Medical Interventions (Patient has pulse or is breathing):    Full Support     Release to patient:    Routine Release       ROSALINA Caballero  11/29/22  07:28 EST     Electronically signed by ROSALINA Caballero, 11/29/22, 7:29 AM EST.    Electronically signed by Marilynn Giordano MD, 11/29/22, 7:50 AM EST.

## 2022-11-29 NOTE — PROGRESS NOTES
Occupational Therapy: Individual: 90 minutes.    Physical Therapy:    Speech Language Pathology:    Signed by: Lisa Goldman, Occupational Therapist

## 2022-11-29 NOTE — PROGRESS NOTES
Occupational Therapy:    Physical Therapy: Individual: 90 minutes.    Speech Language Pathology:    Signed by: Sharita Mcdonnell PTA

## 2022-11-29 NOTE — SIGNIFICANT NOTE
11/29/22 1136   Plan   Plan Spoke to sister Ros [phone] about how pt is doing in therapy, discharge on 11-30-22 to [facility] for surgery, and discharge plans.  Sister confirmed she will come to rehab at 5:00 am tomorrow to transport pt to [facility].   Patient/Family in Agreement with Plan yes

## 2022-11-29 NOTE — DISCHARGE SUMMARY
[facility] HOSPITALISTS DISCHARGE SUMMARY    Patient Identification:  Name:  Melchor Hardwick III  Age:  57 y.o.  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    HOSPITAL COURSE  Patient is a 57 y.o. male presented to [facility] acute inpatient rehab in transfer from [facility].  Patient 57-year-old gentleman who had transmetatarsal amputation of his left foot in May of this year.  Patient states afterwards he was favoring his right leg and thought he was overworking the right knee and developed increased pain and swelling and erythema.  Patient was diagnosed with septic arthritis of the right knee and MRSA bacteremia.  Patient did have I&D of the knee and was taken to the OR for a washout.  Patient was placed on daptomycin for several weeks.  Did have PICC line placed and recommend another 6 to 8 weeks of IV antibiotics since admission to our service.  Patient was admitted for treatment of debility and help restore ambulation.    Right septic knee--again patient had been seen initially at [facility] in [place] where he had washout of the knee as well as continued IV antibiotic therapy.  Patient did have MRSA that was thought to be causative agent.  Patient did go to [facility] for continued IV antibiotic  therapy and we continued patient on daptomycin since admission to our service.  Couple of weeks ago patient developed some increased swelling in the right knee we did obtain CT scan of the knee and patient had recurrence of effusion as well as findings consistent with osteomyelitis involving the lateral tibial plateau.  I did discuss the case with orthopedics here who recommended that he follow-up with his surgeon in [place].  Talking with Dr. Lopez he agreed to see patient in his office as he was not acutely septic.  And he did after evaluation recommended that he come back this week for antibiotic spacer placement.  Patient's been continued on IV daptomycin during this time.    MRSA bacteremia/endocarditis--has been continued on daptomycin.  Did have TYRESE during the admission which again showed likely vegetation on the tricuspid leaflet.    Debility--at the time of admission patient was independent for bed mobility.  Patient unable to do transfers or ambulate secondary to orthostatic hypotension at the time of admission.  At the time of admission patient was requiring moderate assistance for bathing; set up for upper body dressing; moderate assistance for lower body dressing; set up for grooming; moderate assist for toileting.  At the time of discharge, requires minimum assist to contact-guard for toileting.  Contact-guard for transfers.  Set up for upper body and lower body dressing.  Ambulated 60 feet with a front wheel walker with standby assistance at the time of discharge.    Orthostatic hypotension patient did require patient being placed on midodrine and fludrocortisone.  Patient has responded well to this.  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    Diabetes mellitus well controlled throughout the admission.      VITAL SIGNS:  Temp:  [98.2 °F (36.8 °C)-98.3 °F (36.8  °C)] 98.3 °F (36.8 °C)  Heart Rate:  [75-89] 75  Resp:  [18] 18  BP: (122-143)/(73-84) 122/84  SpO2:  [96 %-99 %] 96 %  on   ;   Device (Oxygen Therapy): room air    Body mass index is 28.34 kg/m².  Wt Readings from Last 3 Encounters:   11/01/22 89.6 kg (197 lb 8.5 oz)   11/28/22 88.9 kg (196 lb)   09/23/22 98 kg (216 lb)       PHYSICAL EXAM:  Constitutional: No acute distress  HEENT: Normocephalic atraumatic  Neck: Supple  Cardiovascular: Regular rate and rhythm  Pulmonary/Chest: Clear to auscultation  Abdominal: Positive bowel sounds soft.   Musculoskeletal: Right knee--minimal swelling noted at this time; patient has transtarsal amputation of the left foot  Neurological: Sensory changes stocking glove distribution  Skin: No rash  Peripheral vascular: No edema  Genitourinary::    DISCHARGE DISPOSITION   Stable    DISCHARGE MEDICATIONS:     Discharge Medications      New Medications      Instructions Start Date   acetaminophen 325 MG tablet  Commonly known as: TYLENOL   650 mg, Oral, Every 4 Hours PRN      ascorbic acid 500 MG tablet  Commonly known as: VITAMIN C   500 mg, Oral, Daily   Start Date: November 30, 2022     bisacodyl 10 MG suppository  Commonly known as: DULCOLAX   10 mg, Rectal, Daily PRN      cyclobenzaprine 5 MG tablet  Commonly known as: FLEXERIL   5 mg, Oral, 3 Times Daily PRN      daptomycin solution IVPB  Commonly known as: CUBICIN   500 mg, Intravenous, Every 24 Hours      docusate sodium 100 MG capsule   100 mg, Oral, 2 Times Daily      fludrocortisone 0.1 MG tablet   0.05 mg, Oral, Daily   Start Date: November 30, 2022     HYDROcodone-acetaminophen 5-325 MG per tablet  Commonly known as: NORCO   1 tablet, Oral, Every 8 Hours PRN      levothyroxine 25 MCG tablet  Commonly known as: SYNTHROID, LEVOTHROID   25 mcg, Oral, Every Early Morning   Start Date: November 30, 2022     magic barrier cream   1 application, Topical, As Needed      mupirocin 2 % ointment  Commonly known as: BACTROBAN   1  application, Topical, Every 12 Hours Scheduled      pantoprazole 40 MG EC tablet  Commonly known as: PROTONIX   40 mg, Oral, Every Early Morning   Start Date: November 30, 2022     polyethylene glycol 17 g packet  Commonly known as: MIRALAX   17 g, Oral, Daily   Start Date: November 30, 2022        Changes to Medications      Instructions Start Date   Insulin Aspart 100 UNIT/ML injection  Commonly known as: novoLOG  What changed: how much to take   0-14 Units, Subcutaneous, 3 Times Daily Before Meals      Insulin Aspart 100 UNIT/ML injection  Commonly known as: novoLOG  What changed: You were already taking a medication with the same name, and this prescription was added. Make sure you understand how and when to take each.   4 Units, Subcutaneous, 3 Times Daily Before Meals      insulin detemir 100 UNIT/ML injection  Commonly known as: LEVEMIR  What changed:   · how much to take  · when to take this   17 Units, Subcutaneous, Every 12 Hours Scheduled      pregabalin 100 MG capsule  Commonly known as: LYRICA  What changed:   · medication strength  · how much to take  · when to take this   100 mg, Oral, Every 12 Hours Scheduled         Continue These Medications      Instructions Start Date   aspirin 81 MG EC tablet   81 mg, Oral, Daily      Diclofenac Sodium 1 % gel gel  Commonly known as: VOLTAREN   2 g, Topical, 4 Times Daily PRN      midodrine 2.5 MG tablet  Commonly known as: PROAMATINE   2.5 mg, Oral, 3 Times Daily Before Meals      multivitamin with minerals tablet tablet   1 tablet, Oral, Daily         Stop These Medications    atorvastatin 40 MG tablet  Commonly known as: LIPITOR     ferrous sulfate 325 (65 FE) MG tablet     Metamucil wafer wafer     metFORMIN 500 MG tablet  Commonly known as: GLUCOPHAGE     nabumetone 750 MG tablet  Commonly known as: RELAFEN     tiZANidine 4 MG tablet  Commonly known as: ZANAFLEX             Your medication list      START taking these medications      Instructions Last Dose  Given Next Dose Due   acetaminophen 325 MG tablet  Commonly known as: TYLENOL      Take 2 tablets by mouth Every 4 (Four) Hours As Needed for Mild Pain.       ascorbic acid 500 MG tablet  Commonly known as: VITAMIN C  Start taking on: November 30, 2022      Take 1 tablet by mouth Daily.       bisacodyl 10 MG suppository  Commonly known as: DULCOLAX      Insert 1 suppository into the rectum Daily As Needed for Constipation.       cyclobenzaprine 5 MG tablet  Commonly known as: FLEXERIL      Take 1 tablet by mouth 3 (Three) Times a Day As Needed for Muscle Spasms.       daptomycin solution IVPB  Commonly known as: CUBICIN      Infuse 50 mL into a venous catheter Daily for 5 doses. Indications: Bacteria in the Blood       docusate sodium 100 MG capsule      Take 1 capsule by mouth 2 (Two) Times a Day.       fludrocortisone 0.1 MG tablet  Start taking on: November 30, 2022      Take 0.5 tablets by mouth Daily.       HYDROcodone-acetaminophen 5-325 MG per tablet  Commonly known as: NORCO      Take 1 tablet by mouth Every 8 (Eight) Hours As Needed for Moderate Pain for up to 4 days.       levothyroxine 25 MCG tablet  Commonly known as: SYNTHROID, LEVOTHROID  Start taking on: November 30, 2022      Take 1 tablet by mouth Every Morning.       magic barrier cream      Apply 1 application topically to the appropriate area as directed As Needed (skin irritation).       mupirocin 2 % ointment  Commonly known as: BACTROBAN      Apply 1 application topically to the appropriate area as directed Every 12 (Twelve) Hours.       pantoprazole 40 MG EC tablet  Commonly known as: PROTONIX  Start taking on: November 30, 2022      Take 1 tablet by mouth Every Morning.       polyethylene glycol 17 g packet  Commonly known as: MIRALAX  Start taking on: November 30, 2022      Take 17 g by mouth Daily.          CHANGE how you take these medications      Instructions Last Dose Given Next Dose Due   Insulin Aspart 100 UNIT/ML injection  Commonly  known as: novoLOG  What changed: how much to take      Inject 0-14 Units under the skin into the appropriate area as directed 3 (Three) Times a Day Before Meals.       Insulin Aspart 100 UNIT/ML injection  Commonly known as: novoLOG  What changed: You were already taking a medication with the same name, and this prescription was added. Make sure you understand how and when to take each.      Inject 4 Units under the skin into the appropriate area as directed 3 (Three) Times a Day Before Meals.       insulin detemir 100 UNIT/ML injection  Commonly known as: LEVEMIR  What changed:   · how much to take  · when to take this      Inject 17 Units under the skin into the appropriate area as directed Every 12 (Twelve) Hours.       pregabalin 100 MG capsule  Commonly known as: LYRICA  What changed:   · medication strength  · how much to take  · when to take this      Take 1 capsule by mouth Every 12 (Twelve) Hours.          CONTINUE taking these medications      Instructions Last Dose Given Next Dose Due   aspirin 81 MG EC tablet      Take 1 tablet by mouth Daily.       Diclofenac Sodium 1 % gel gel  Commonly known as: VOLTAREN      Apply 2 g topically to the appropriate area as directed 4 (Four) Times a Day As Needed.       midodrine 2.5 MG tablet  Commonly known as: PROAMATINE      Take 2.5 mg by mouth 3 (Three) Times a Day Before Meals.       multivitamin with minerals tablet tablet      Take 1 tablet by mouth Daily.          STOP taking these medications    atorvastatin 40 MG tablet  Commonly known as: LIPITOR        ferrous sulfate 325 (65 FE) MG tablet        Metamucil wafer wafer        metFORMIN 500 MG tablet  Commonly known as: GLUCOPHAGE        nabumetone 750 MG tablet  Commonly known as: RELAFEN        tiZANidine 4 MG tablet  Commonly known as: ZANAFLEX              Where to Get Your Medications      Information about where to get these medications is not yet available    Ask your nurse or doctor about these  medications  · acetaminophen 325 MG tablet  · ascorbic acid 500 MG tablet  · bisacodyl 10 MG suppository  · cyclobenzaprine 5 MG tablet  · daptomycin solution IVPB  · docusate sodium 100 MG capsule  · fludrocortisone 0.1 MG tablet  · HYDROcodone-acetaminophen 5-325 MG per tablet  · Insulin Aspart 100 UNIT/ML injection  · Insulin Aspart 100 UNIT/ML injection  · insulin detemir 100 UNIT/ML injection  · levothyroxine 25 MCG tablet  · magic barrier cream   · mupirocin 2 % ointment  · pantoprazole 40 MG EC tablet  · polyethylene glycol 17 g packet  · pregabalin 100 MG capsule         Diet Instructions     Diet: Consistent Carbohydrate      Discharge Diet: Consistent Carbohydrate        No future appointments.   Follow-up Information     Missy Up APRN. Go on 11/28/2022.    Specialties: Nurse Practitioner, Family Medicine  Why: follow up within 2 weeks of discharge.  Contact information:  [address] [place], [place] [id number] [phone]                          TEST  RESULTS PENDING AT DISCHARGE       CODE STATUS  Code Status and Medical Interventions:   Ordered at: 11/03/22 1015     Level Of Support Discussed With:    Patient     Code Status (Patient has no pulse and is not breathing):    CPR (Attempt to Resuscitate)     Medical Interventions (Patient has pulse or is breathing):    Full Support     Release to patient:    Routine Release       Joe Mike MD  [facility]  11/29/22  10:26 EST    Please note that this discharge summary required less than 30 minutes to complete.

## 2022-11-29 NOTE — PLAN OF CARE
Goal Outcome Evaluation:  Plan of Care Reviewed With: patient        Progress: improving  Outcome Evaluation: PROGRESSING WITH CURRENT THERAPIES; CONTINUE PLAN OF CARE; PATIENT IS TO BE DISCHARGED EARLY WEDNESDAY MORNING TO BE ADMITTED AT ANOTHER FACILITY FOR SURGERY; PATIENT TRAVELED TODAY TO THE OTHER FACILITY FOR PRE OP; MONITOR

## 2022-11-29 NOTE — THERAPY TREATMENT NOTE
Inpatient Rehabilitation - Physical Therapy Treatment Note       YVONNE Gaines     Patient Name: Melchor Hardwick III  : 1965  MRN: [id number]    Today's Date: 2022                    Admit Date: 10/28/2022      Visit Dx:     ICD-10-CM ICD-9-CM   1. Staphylococcal arthritis of right knee (HCC)  M00.061 711.06     041.10   2. History of osteomyelitis  Z87.39 V13.59       Patient Active Problem List   Diagnosis   • Hyperlipidemia   • Hypertensive disorder   • Obesity   • Type 2 diabetes mellitus with hyperglycemia, with long-term current use of insulin (HCC)   • Gas gangrene of foot (HCC)   • Cellulitis in diabetic foot (HCC)   • Other acute osteomyelitis of left foot (HCC)   • Osteomyelitis (HCC)   • Critical illness myopathy   • Staphylococcal arthritis of right knee (HCC)   • Other cirrhosis of liver (HCC)   • MRSA bacteremia   • Endocarditis of tricuspid valve   • Wound of right buttock   • History of osteomyelitis   • Debility       Past Medical History:   Diagnosis Date   • Diabetes mellitus (HCC)     4 times per day gets checked for sugar at rehab   • Dizzy    • Hyperlipidemia    • Hypertension    • MRSA infection     blood -      • Orthostatic hypotension    • Pressure sore on buttocks     top crack -  sees wound - but now rn take care of it at rehab - minor opening - dime size - pat nurse didnt assess-  pt states getting better   • Pyogenic arthritis of right knee joint, due to unspecified organism (HCC) 2022   • Sepsis (HCC)    • Wears glasses     readers       Past Surgical History:   Procedure Laterality Date   • ABSCESS DRAINAGE  2004    PERINEUM   • AMPUTATION FOOT Left 2022    Procedure: AMPUTATION FOOT;  Surgeon: Addison Colby MD;  Location: [facility];  Service: Podiatry;  Laterality: Left;   • INCISION AND DRAINAGE LEG Left 05/10/2022    Procedure: INCISION AND DRAINAGE LOWER EXTREMITY;  Surgeon: Addison Colby MD;  Location: [facility] OR;  Service: Podiatry;  Laterality:  Left;   • KNEE INCISION AND DRAINAGE Right 09/21/2022    Procedure: KNEE INCISION AND DRAINAGE RIGHT;  Surgeon: Brain Bentley MD;  Location: [facility];  Service: Orthopedics;  Laterality: Right;   • PERIPHERALLY INSERTED CENTRAL CATHETER INSERTION Left    • WOUND DEBRIDEMENT Left 05/13/2022    Procedure: DEBRIDEMENT FOOT;  Surgeon: Addison Colby MD;  Location: [facility];  Service: Podiatry;  Laterality: Left;       PT ASSESSMENT (last 12 hours)     IRF PT Evaluation and Treatment     Row Name 11/29/22 1415          PT Time and Intention    Document Type daily treatment;other (see comments)  PM session  -     Mode of Treatment individual therapy;physical therapy  -     Patient/Family/Caregiver Comments/Observations Patient had no new c/o this date and was agreeable to PM PT session.  Patient scheduled for surgery tomorrow in [place] and is planning to leaving facility early in AM.  -     Row Name 11/29/22 1415          General Information    Patient Profile Reviewed yes  -LL     Existing Precautions/Restrictions fall;brace worn when out of bed  air cast LLE  -LL     Row Name 11/29/22 1415          Cognition/Psychosocial    Affect/Mental Status (Cognition) WFL  -     Orientation Status (Cognition) oriented x 3  -LL     Follows Commands (Cognition) WFL  -     Personal Safety Interventions fall prevention program maintained;gait belt;nonskid shoes/slippers when out of bed  -     Cognitive Function WFL  -LL     Row Name 11/29/22 1415          Bed Mobility    Supine-Sit [place] (Bed Mobility) supervision  with HOB elevated  -     Row Name 11/29/22 1415          Transfer Assessment/Treatment    Transfers bed-chair transfer;chair-bed transfer;sit-stand transfer;stand-sit transfer;car transfer  -     Row Name 11/29/22 1415          Bed-Chair Transfer    Bed-Chair [place] (Transfers) contact guard;verbal cues;nonverbal cues (demo/gesture)  -     Assistive Device (Bed-Chair Transfers)  walker, front-wheeled;wheelchair  -LL     Row Name 11/29/22 1415          Sit-Stand Transfer    Sit-Stand [place] (Transfers) contact guard;standby assist  -LL     Assistive Device (Sit-Stand Transfers) walker, front-wheeled;wheelchair  -LL     Row Name 11/29/22 1415          Stand-Sit Transfer    Stand-Sit [place] (Transfers) contact guard;standby assist  -LL     Assistive Device (Stand-Sit Transfers) wheelchair;walker, front-wheeled  -LL     Row Name 11/29/22 1415          Gait/Stairs (Locomotion)    Gait/Stairs Locomotion gait/ambulation independence;gait/ambulation assistive device;distance ambulated  -LL     [place] Level (Gait) supervision  -LL     Assistive Device (Gait) walker, front-wheeled  -LL     Distance in Feet (Gait) 140'  -LL     Pattern (Gait) step-to;step-through  -LL     Deviations/Abnormal Patterns (Gait) right sided deviations;antalgic;stride length decreased;weight shifting decreased  -LL     Bilateral Gait Deviations forward flexed posture  -LL     Right Sided Gait Deviations heel strike decreased;weight shift ability decreased  -LL     Comment, (Gait/Stairs) Forward flexion w/ cues for upright posture  -LL     Row Name 11/29/22 1415          Safety Issues, Functional Mobility    Impairments Affecting Function (Mobility) balance;endurance/activity tolerance;pain;range of motion (ROM);postural/trunk control  -LL     Row Name 11/29/22 1415          Balance    Comment, Balance 4-5# ball chest press  -     Row Name 11/29/22 1415          Hip (Therapeutic Exercise)    Hip Strengthening (Therapeutic Exercise) bilateral;flexion;extension;aBduction;aDduction;marching while seated;sitting;3 lb free weight;resistance band;blue  -LL     Row Name 11/29/22 1415          Knee (Therapeutic Exercise)    Knee Strengthening (Therapeutic Exercise) bilateral;flexion;extension;marching while seated;LAQ (long arc quad);hamstring curls;sitting;3 lb free weight;resistance band;blue  -LL     Row Name  11/29/22 1415          Positioning and Restraints    Pre-Treatment Position in bed  -LL     Post Treatment Position wheelchair  -LL     In Wheelchair sitting;with PT  -LL     Row Name 11/29/22 1415          Daily Progress Summary (PT)    Impairments Still Limiting Function (PT) functional activity tolerance impairment;pain;range of motion deficit;strength deficit  -LL     Row Name 11/29/22 1415          IRF PT Goals    Bed Mobility Goal Selection (PT-IRF) bed mobility, PT goal 1  -LL     Transfer Goal Selection (PT-IRF) transfers, PT goal 1  -LL     Gait (Walking Locomotion) Goal Selection (PT-IRF) gait, PT goal 1  -LL     Row Name 11/29/22 1415          Bed Mobility Goal 1 (PT-IRF)    Activity/Assistive Device (Bed Mobility Goal 1, PT-IRF) bed mobility activities, all  -LL     [place] Level (Bed Mobility Goal 1, PT-IRF) independent  -LL     Time Frame (Bed Mobility Goal 1, PT-IRF) long-term goal (LTG)  -LL     Progress/Outcomes (Bed Mobility Goal 1, PT-IRF) goal met  -LL     Row Name 11/29/22 1415          Transfer Goal 1 (PT-IRF)    Activity/Assistive Device (Transfer Goal 1, PT-IRF) sit-to-stand/stand-to-sit;bed-to-chair/chair-to-bed;walker, rolling  -LL     [place] Level (Transfer Goal 1, PT-IRF) modified independence  -LL     Time Frame (Transfer Goal 1, PT-IRF) long-term goal (LTG)  -LL     Progress/Outcomes (Transfer Goal 1, PT-IRF) new goal  -LL     Row Name 11/29/22 1415          Gait/Walking Locomotion Goal 1 (PT-IRF)    Activity/Assistive Device (Gait/Walking Locomotion Goal 1, PT-IRF) gait (walking locomotion);assistive device use  -LL     Gait/Walking Locomotion Distance Goal 1 (PT-IRF) 100'  -LL     [place] Level (Gait/Walking Locomotion Goal 1, PT-IRF) modified independence  -LL     Time Frame (Gait/Walking Locomotion Goal 1, PT-IRF) long-term goal (LTG)  -LL     Progress/Outcomes (Gait/Walking Locomotion Goal 1, PT-IRF) goal revised this date  -LL           User Key  (r) = Recorded By,  (t) = Taken By, (c) = Cosigned By    Initials Name Provider Type    LL Sharita Mcdonnell PTA Physical Therapist Assistant              Wound 09/21/22 1532 Right anterior knee Incision (Active)   Dressing Appearance open to air 11/29/22 1053   Closure Approximated 11/29/22 1053   Base clean;dry 11/28/22 1920   Periwound intact 11/29/22 1053   [place]iw[place] Temperature warm 11/29/22 1053   Periwound Skin Turgor soft 11/29/22 1053   Drainage Amount none 11/28/22 1920   Care, Wound other (see comments) 11/28/22 1920   Dressing Care open to air 11/29/22 1053       Wound 10/28/22 1827 Right coccyx Pressure Injury (Active)   Dressing Appearance open to air 11/28/22 1920   Closure None 11/28/22 1920   Base blanchable;red 11/29/22 1053   Periwound intact 11/29/22 1053   Periwound Temperature warm 11/29/22 1053   Periwound Skin Turgor soft 11/29/22 1053   Drainage Amount none 11/28/22 1920   Care, Wound barrier applied 11/29/22 1053   Dressing Care open to air 11/29/22 1053     Physical Therapy Education     Title: PT OT SLP Therapies (Done)     Topic: Physical Therapy (Done)     Point: Mobility training (Done)     Learning Progress Summary           Patient Acceptance, E,D, VU,NR by LL at 11/29/2022 1423    Acceptance, E,D, VU,NR by RG at 11/28/2022 1126    Acceptance, E,TB, VU by RF at 11/26/2022 1512    Acceptance, E, VU,NR by LB at 11/25/2022 1548    Acceptance, E,TB, VU by RF at 11/25/2022 1547    Acceptance, E,TB, VU by DL at 11/24/2022 0154    Acceptance, E,TB, VU by RF at 11/23/2022 1417    Acceptance, E,TB, VU by DL at 11/23/2022 0029    Acceptance, E,TB, VU by RF at 11/22/2022 1609    Acceptance, E,TB, VU by DL at 11/22/2022 0442    Acceptance, E,TB, VU by DL at 11/22/2022 0429    Acceptance, E,TB, VU by RF at 11/21/2022 1522    Acceptance, E,TB, VU,DU by HR at 11/18/2022 1546    Acceptance, E,TB, VU by RF at 11/18/2022 1525    Acceptance, E,TB, VU by RF at 11/17/2022 1528    Acceptance, E,TB, VU by DL at 11/17/2022 0233     Acceptance, E,TB, VU by RF at 11/16/2022 1606    Acceptance, E,TB, VU by DL at 11/15/2022 2033    Acceptance, E,TB, VU by RF at 11/15/2022 1541    Acceptance, E,TB, VU by DL at 11/15/2022 0305    Acceptance, E,TB, VU by RF at 11/14/2022 1528    Acceptance, E,TB, VU by RF at 11/10/2022 1551    Acceptance, E,TB, VU by RF at 11/9/2022 1622    Acceptance, E,TB, VU by DG at 11/9/2022 0050    Acceptance, E,TB, VU by RF at 11/8/2022 1539    Acceptance, E,TB, VU by DG at 11/8/2022 0114    Acceptance, E,TB, VU by RF at 11/7/2022 1607    Acceptance, E,TB, VU by DG at 11/6/2022 2023    Acceptance, E,TB, VU by RF at 11/4/2022 1551    Acceptance, E,TB, VU,DU by HR at 11/3/2022 1541    Acceptance, E,D, VU,NR by RG at 11/3/2022 1442    Acceptance, E,TB, VU by DG at 11/1/2022 2016    Acceptance, E,D, VU,NR by RG at 11/1/2022 1541    Acceptance, E,TB, VU by DG at 10/31/2022 2315    Acceptance, E,D, VU,NR by RG at 10/31/2022 1446                   Point: Home exercise program (Done)     Learning Progress Summary           Patient Acceptance, E,D, VU,NR by LL at 11/29/2022 1423    Acceptance, E,D, VU,NR by RG at 11/28/2022 1126    Acceptance, E,TB, VU by RF at 11/26/2022 1512    Acceptance, E, VU,NR by LB at 11/25/2022 1548    Acceptance, E,TB, VU by RF at 11/25/2022 1547    Acceptance, E,TB, VU by DL at 11/24/2022 0154    Acceptance, E,TB, VU by RF at 11/23/2022 1417    Acceptance, E,TB, VU by DL at 11/23/2022 0029    Acceptance, E,TB, VU by RF at 11/22/2022 1609    Acceptance, E,TB, VU by DL at 11/22/2022 0442    Acceptance, E,TB, VU by DL at 11/22/2022 0429    Acceptance, E,TB, VU by RF at 11/21/2022 1522    Acceptance, E,TB, VU,DU by HR at 11/18/2022 1546    Acceptance, E,TB, VU by RF at 11/18/2022 1525    Acceptance, E,TB, VU by RF at 11/17/2022 1528    Acceptance, E,TB, VU by DL at 11/17/2022 0233    Acceptance, E,TB, VU by RF at 11/16/2022 1606    Acceptance, E,TB, VU by DL at 11/15/2022 2033    Acceptance, E,TB, VU by RF at  11/15/2022 1541    Acceptance, E,TB, VU by DL at 11/15/2022 0305    Acceptance, E,TB, VU by RF at 11/14/2022 1528    Acceptance, E,TB, VU by RF at 11/10/2022 1551    Acceptance, E,TB, VU by RF at 11/9/2022 1622    Acceptance, E,TB, VU by DG at 11/9/2022 0050    Acceptance, E,TB, VU by RF at 11/8/2022 1539    Acceptance, E,TB, VU by DG at 11/8/2022 0114    Acceptance, E,TB, VU by RF at 11/7/2022 1607    Acceptance, E,TB, VU by DG at 11/6/2022 2023    Acceptance, E,TB, VU by RF at 11/4/2022 1551    Acceptance, E,TB, VU,DU by HR at 11/3/2022 1541    Acceptance, E,D, VU,NR by RG at 11/3/2022 1442    Acceptance, E,TB, VU by DG at 11/1/2022 2016    Acceptance, E,D, VU,NR by RG at 11/1/2022 1541    Acceptance, E,TB, VU by DG at 10/31/2022 2315    Acceptance, E,D, VU,NR by RG at 10/31/2022 1446                   Point: Body mechanics (Done)     Learning Progress Summary           Patient Acceptance, E,D, VU,NR by LL at 11/29/2022 1423    Acceptance, E,D, VU,NR by RG at 11/28/2022 1126    Acceptance, E,TB, VU by RF at 11/26/2022 1512    Acceptance, E, VU,NR by LB at 11/25/2022 1548    Acceptance, E,TB, VU by RF at 11/25/2022 1547    Acceptance, E,TB, VU by DL at 11/24/2022 0154    Acceptance, E,TB, VU by RF at 11/23/2022 1417    Acceptance, E,TB, VU by DL at 11/23/2022 0029    Acceptance, E,TB, VU by RF at 11/22/2022 1609    Acceptance, E,TB, VU by DL at 11/22/2022 0442    Acceptance, E,TB, VU by DL at 11/22/2022 0429    Acceptance, E,TB, VU by RF at 11/21/2022 1522    Acceptance, E,TB, VU,DU by HR at 11/18/2022 1546    Acceptance, E,TB, VU by RF at 11/18/2022 1525    Acceptance, E,TB, VU by RF at 11/17/2022 1528    Acceptance, E,TB, VU by DL at 11/17/2022 0233    Acceptance, E,TB, VU by RF at 11/16/2022 1606    Acceptance, E,TB, VU by DL at 11/15/2022 2033    Acceptance, E,TB, VU by RF at 11/15/2022 1541    Acceptance, E,TB, VU by DL at 11/15/2022 0305    Acceptance, E,TB, VU by RF at 11/14/2022 1528    Acceptance, E,TB, VU  by RF at 11/10/2022 1551    Acceptance, E,TB, VU by RF at 11/9/2022 1622    Acceptance, E,TB, VU by DG at 11/9/2022 0050    Acceptance, E,TB, VU by RF at 11/8/2022 1539    Acceptance, E,TB, VU by DG at 11/8/2022 0114    Acceptance, E,TB, VU by RF at 11/7/2022 1607    Acceptance, E,TB, VU by DG at 11/6/2022 2023    Acceptance, E,TB, VU by RF at 11/4/2022 1551    Acceptance, E,TB, VU,DU by HR at 11/3/2022 1541    Acceptance, E,D, VU,NR by RG at 11/3/2022 1442    Acceptance, E,TB, VU by DG at 11/1/2022 2016    Acceptance, E,D, VU,NR by RG at 11/1/2022 1541    Acceptance, E,TB, VU by DG at 10/31/2022 2315    Acceptance, E,D, VU,NR by RG at 10/31/2022 1446                   Point: Precautions (Done)     Learning Progress Summary           Patient Acceptance, E,D, VU,NR by LL at 11/29/2022 1423    Acceptance, E,D, VU,NR by RG at 11/28/2022 1126    Acceptance, E,TB, VU by RF at 11/26/2022 1512    Acceptance, E, VU,NR by LB at 11/25/2022 1548    Acceptance, E,TB, VU by RF at 11/25/2022 1547    Acceptance, E,TB, VU by DL at 11/24/2022 0154    Acceptance, E,TB, VU by RF at 11/23/2022 1417    Acceptance, E,TB, VU by DL at 11/23/2022 0029    Acceptance, E,TB, VU by RF at 11/22/2022 1609    Acceptance, E,TB, VU by DL at 11/22/2022 0442    Acceptance, E,TB, VU by DL at 11/22/2022 0429    Acceptance, E,TB, VU by RF at 11/21/2022 1522    Acceptance, E,TB, VU,DU by HR at 11/18/2022 1546    Acceptance, E,TB, VU by RF at 11/18/2022 1525    Acceptance, E,TB, VU by RF at 11/17/2022 1528    Acceptance, E,TB, VU by DL at 11/17/2022 0233    Acceptance, E,TB, VU by RF at 11/16/2022 1606    Acceptance, E,TB, VU by DL at 11/15/2022 2033    Acceptance, E,TB, VU by RF at 11/15/2022 1541    Acceptance, E,TB, VU by DL at 11/15/2022 0305    Acceptance, E,TB, VU by RF at 11/14/2022 1528    Acceptance, E,TB, VU by RF at 11/10/2022 1551    Acceptance, E,TB, VU by RF at 11/9/2022 1622    Acceptance, E,TB, VU by DG at 11/9/2022 0050    Acceptance,  E,TB, VU by RF at 11/8/2022 1539    Acceptance, E,TB, VU by DG at 11/8/2022 0114    Acceptance, E,TB, VU by RF at 11/7/2022 1607    Acceptance, E,TB, VU by DG at 11/6/2022 2023    Acceptance, E,TB, VU by RF at 11/4/2022 1551    Acceptance, E,TB, VU,DU by HR at 11/3/2022 1541    Acceptance, E,D, VU,NR by RG at 11/3/2022 1442    Acceptance, E,TB, VU by DG at 11/1/2022 2016    Acceptance, E,D, VU,NR by RG at 11/1/2022 1541    Acceptance, E,TB, VU by DG at 10/31/2022 2315    Acceptance, E,D, VU,NR by RG at 10/31/2022 1446                               User Key     Initials Effective Dates Name Provider Type Discipline     06/16/21 -  Phyllis Yañez RN Registered Nurse Nurse    LB 06/16/21 -  Willow Villalta, PT Physical Therapist PT    LL 05/02/16 -  Sharita Mcdonnell, LIZETT Physical Therapist Assistant PT    RF 06/16/21 -  Meron Lei PTA Physical Therapist Assistant PT    RG 06/16/21 -  Michi Mckeon, LIZETT Physical Therapist Assistant PT    HR 01/14/22 -  Hanna Murphy, LIZETT Physical Therapist Assistant PT    DL 03/16/22 -  Hellen Mcdonnell, RN Registered Nurse Nurse                PT Recommendation and Plan          Daily Progress Summary (PT)  Impairments Still Limiting Function (PT): functional activity tolerance impairment, pain, range of motion deficit, strength deficit               Time Calculation:      PT Charges     Row Name 11/29/22 1423             Time Calculation    Start Time 1325  -LL      Stop Time 1410  -LL      Time Calculation (min) 45 min  -LL      PT Received On 11/29/22  -LL         Time Calculation- PT    Total Timed Code Minutes- PT 45 minute(s)  -LL            User Key  (r) = Recorded By, (t) = Taken By, (c) = Cosigned By    Initials Name Provider Type    LL Sharita Mcdonnell, LIZETT Physical Therapist Assistant                Therapy Charges for Today     Code Description Service Date Service Provider Modifiers Qty    [id number] HC GAIT TRAINING EA 15 MIN 11/29/2022 Sharita Mcdonnell, PTA  GP, CQ 1    [id number]  PT THER PROC EA 15 MIN 11/29/2022 Sharita Mcdonnell, LIZETT GP, CQ 2                   Sharita. LIZETT Mcdonnell  11/29/2022

## 2022-11-29 NOTE — THERAPY TREATMENT NOTE
Inpatient Rehabilitation - Physical Therapy Treatment Note       YVONNE Gaines     Patient Name: Melchor Hardwick III  : 1965  MRN: [id number]    Today's Date: 2022                    Admit Date: 10/28/2022      Visit Dx:     ICD-10-CM ICD-9-CM   1. Staphylococcal arthritis of right knee (HCC)  M00.061 711.06     041.10   2. History of osteomyelitis  Z87.39 V13.59       Patient Active Problem List   Diagnosis   • Hyperlipidemia   • Hypertensive disorder   • Obesity   • Type 2 diabetes mellitus with hyperglycemia, with long-term current use of insulin (HCC)   • Gas gangrene of foot (HCC)   • Cellulitis in diabetic foot (HCC)   • Other acute osteomyelitis of left foot (HCC)   • Osteomyelitis (HCC)   • Critical illness myopathy   • Staphylococcal arthritis of right knee (HCC)   • Other cirrhosis of liver (HCC)   • MRSA bacteremia   • Endocarditis of tricuspid valve   • Wound of right buttock   • History of osteomyelitis   • Debility       Past Medical History:   Diagnosis Date   • Diabetes mellitus (HCC)     4 times per day gets checked for sugar at rehab   • Dizzy    • Hyperlipidemia    • Hypertension    • MRSA infection     blood -      • Orthostatic hypotension    • Pressure sore on buttocks     top crack -  sees wound - but now rn take care of it at rehab - minor opening - dime size - pat nurse didnt assess-  pt states getting better   • Pyogenic arthritis of right knee joint, due to unspecified organism (HCC) 2022   • Sepsis (HCC)    • Wears glasses     readers       Past Surgical History:   Procedure Laterality Date   • ABSCESS DRAINAGE  2004    PERINEUM   • AMPUTATION FOOT Left 2022    Procedure: AMPUTATION FOOT;  Surgeon: Addison Colby MD;  Location: [facility];  Service: Podiatry;  Laterality: Left;   • INCISION AND DRAINAGE LEG Left 05/10/2022    Procedure: INCISION AND DRAINAGE LOWER EXTREMITY;  Surgeon: Addison Colby MD;  Location: [facility] OR;  Service: Podiatry;  Laterality:  Left;   • KNEE INCISION AND DRAINAGE Right 09/21/2022    Procedure: KNEE INCISION AND DRAINAGE RIGHT;  Surgeon: Brain Bentley MD;  [facility];  Service: Orthopedics;  Laterality: Right;   • PERIPHERALLY INSERTED CENTRAL CATHETER INSERTION Left    • WOUND DEBRIDEMENT Left 05/13/2022    Procedure: DEBRIDEMENT FOOT;  Surgeon: Addison Colby MD;  Location: [facility];  Service: Podiatry;  Laterality: Left;       PT ASSESSMENT (last 12 hours)     IRF PT Evaluation and Treatment     Row Name 11/29/22 1516 11/29/22 1415       PT Time and Intention    Document Type daily treatment;other (see comments)  -RF daily treatment;other (see comments)  PM session  -LL    Mode of Treatment individual therapy;physical therapy  -RF individual therapy;physical therapy  -LL    Patient/Family/Caregiver Comments/Observations Pt c/o severe low back pain this date.  -RF Patient had no new c/o this date and was agreeable to PM PT session.  Patient scheduled for surgery tomorrow in [place] and is planning to leaving facility early in AM.  -LL    Row Name 11/29/22 1516 11/29/22 1415       General Information    Patient Profile Reviewed yes  -RF yes  -LL    Existing Precautions/Restrictions fall;brace worn when out of bed  air cast LLE  -RF fall;brace worn when out of bed  air cast LLE  -LL    Row Name 11/29/22 1516 11/29/22 1415       Cognition/Psychosocial    Affect/Mental Status (Cognition) WFL  -RF WFL  -LL    Orientation Status (Cognition) oriented x 4  -RF oriented x 3  -LL    Follows Commands (Cognition) WFL  -RF WFL  -LL    Personal Safety Interventions fall prevention program maintained;nonskid shoes/slippers when out of bed;gait belt  -RF fall prevention program maintained;gait belt;nonskid shoes/slippers when out of bed  -LL    Cognitive Function WFL  -RF WFL  -LL    Row Name 11/29/22 1516 11/29/22 1415       Bed Mobility    Supine-Sit Eddy (Bed Mobility) supervision  -RF supervision  with HOB elevated  -LL     Sit-Supine Arley (Bed Mobility) contact guard;standby assist  -RF --    Assistive Device (Bed Mobility) bed rails;head of bed elevated  -RF --    Row Name 11/29/22 1516 11/29/22 1415       Transfer Assessment/Treatment    Transfers bed-chair transfer;chair-bed transfer;sit-stand transfer;stand-sit transfer;car transfer  -RF bed-chair transfer;chair-bed transfer;sit-stand transfer;stand-sit transfer;car transfer  -LL    Row Name 11/29/22 1516 11/29/22 1415       Bed-Chair Transfer    Bed-Chair Arley (Transfers) contact guard  -RF contact guard;verbal cues;nonverbal cues (demo/gesture)  -LL    Assistive Device (Bed-Chair Transfers) walker, front-wheeled  -RF walker, front-wheeled;wheelchair  -LL    Row Name 11/29/22 1516          Chair-Bed Transfer    Chair-Bed Arley (Transfers) contact guard  -RF     Assistive Device (Chair-Bed Transfers) walker, front-wheeled  -RF     Row Name 11/29/22 1516 11/29/22 1415       Sit-Stand Transfer    Sit-Stand Arley (Transfers) contact guard;standby assist  -RF contact guard;standby assist  -LL    Assistive Device (Sit-Stand Transfers) walker, front-wheeled;wheelchair  -RF walker, front-wheeled;wheelchair  -LL    Row Name 11/29/22 1516 11/29/22 1415       Stand-Sit Transfer    Stand-Sit Arley (Transfers) contact guard;standby assist  -RF contact guard;standby assist  -LL    Assistive Device (Stand-Sit Transfers) wheelchair;walker, front-wheeled  -RF wheelchair;walker, front-wheeled  -LL    Row Name 11/29/22 1415          Gait/Stairs (Locomotion)    Gait/Stairs Locomotion gait/ambulation independence;gait/ambulation assistive device;distance ambulated  -LL     Arley Level (Gait) supervision  -LL     Assistive Device (Gait) walker, front-wheeled  -LL     Distance in Feet (Gait) 140'  -LL     Pattern (Gait) step-to;step-through  -LL     Deviations/Abnormal Patterns (Gait) right sided deviations;antalgic;stride length decreased;weight shifting  decreased  -LL     Bilateral Gait Deviations forward flexed posture  -LL     Right Sided Gait Deviations heel strike decreased;weight shift ability decreased  -LL     Comment, (Gait/Stairs) Forward flexion w/ cues for upright posture  -LL     Row Name 11/29/22 1516          Wheelchair Mobility/Management    Method of Wheelchair Locomotion (Mobility) bimanual (upper extremity) propulsion  -RF     Mobility Activities (Wheelchair) forward propulsion  -RF     Forward Propulsion [place] (Wheelchair) independent  -RF     Distance Propelled in Feet (Wheelchair) 140  -RF     Row Name 11/29/22 1516 11/29/22 1415       Safety Issues, Functional Mobility    Impairments Affecting Function (Mobility) balance;endurance/activity tolerance;pain;range of motion (ROM);postural/trunk control  -RF balance;endurance/activity tolerance;pain;range of motion (ROM);postural/trunk control  -LL    Row Name 11/29/22 1415          Balance    Comment, Balance 4-5# ball chest press  -LL     Row Name 11/29/22 1516 11/29/22 1415       Hip (Therapeutic Exercise)    Hip Strengthening (Therapeutic Exercise) bilateral;flexion;extension;aBduction;aDduction;heel slides;marching while seated;sitting;3 lb free weight;resistance band;blue;2 sets;10 repetitions  -RF bilateral;flexion;extension;aBduction;aDduction;marching while seated;sitting;3 lb free weight;resistance band;blue  -LL    Row Name 11/29/22 1516 11/29/22 1415       Knee (Therapeutic Exercise)    Knee Strengthening (Therapeutic Exercise) bilateral;flexion;extension;heel slides;marching while seated;LAQ (long arc quad);hamstring curls;sitting;3 lb free weight;resistance band;blue;2 sets;10 repetitions  -RF bilateral;flexion;extension;marching while seated;LAQ (long arc quad);hamstring curls;sitting;3 lb free weight;resistance band;blue  -LL    Row Name 11/29/22 1516          Ankle (Therapeutic Exercise)    Ankle Strengthening (Therapeutic Exercise)  bilateral;dorsiflexion;plantarflexion;sitting;2 lb free weight;2 sets;10 repetitions  -RF     Row Name 11/29/22 1516 11/29/22 1415       Positioning and Restraints    Pre-Treatment Position in bed  -RF in bed  -LL    Post Treatment Position wheelchair  -RF wheelchair  -LL    In Wheelchair sitting;with OT  -RF sitting;with PT  -LL    Row Name 11/29/22 1516 11/29/22 1415       Daily Progress Summary (PT)    Functional Goal Overall Progress (PT) progressing toward functional goals slower than expected  -RF --    Daily Progress Summary (PT) Progress hindered due to low back and R knee pain. Functional mobility fair; CGA required for safety.  -RF --    Impairments Still Limiting Function (PT) functional activity tolerance impairment;pain;range of motion deficit;strength deficit  -RF functional activity tolerance impairment;pain;range of motion deficit;strength deficit  -LL    Row Name 11/29/22 1516 11/29/22 1415       IRF PT Goals    Bed Mobility Goal Selection (PT-IRF) bed mobility, PT goal 1  -RF bed mobility, PT goal 1  -LL    Transfer Goal Selection (PT-IRF) transfers, PT goal 1  -RF transfers, PT goal 1  -LL    Gait (Walking Locomotion) Goal Selection (PT-IRF) gait, PT goal 1  -RF gait, PT goal 1  -LL    Row Name 11/29/22 1516 11/29/22 1415       Bed Mobility Goal 1 (PT-IRF)    Activity/Assistive Device (Bed Mobility Goal 1, PT-IRF) bed mobility activities, all  -RF bed mobility activities, all  -LL    [place] Level (Bed Mobility Goal 1, PT-IRF) independent  -RF independent  -LL    Time Frame (Bed Mobility Goal 1, PT-IRF) long-term goal (LTG)  -RF long-term goal (LTG)  -LL    Progress/Outcomes (Bed Mobility Goal 1, PT-IRF) goal met  -RF goal met  -LL    Row Name 11/29/22 1516 11/29/22 1415       Transfer Goal 1 (PT-IRF)    Activity/Assistive Device (Transfer Goal 1, PT-IRF) sit-to-stand/stand-to-sit;bed-to-chair/chair-to-bed;walker, rolling  -RF sit-to-stand/stand-to-sit;bed-to-chair/chair-to-bed;walker,  rolling  -LL    [place] Level (Transfer Goal 1, PT-IRF) modified independence  -RF modified independence  -LL    Time Frame (Transfer Goal 1, PT-IRF) long-term goal (LTG)  -RF long-term goal (LTG)  -LL    Progress/Outcomes (Transfer Goal 1, PT-IRF) new goal  -RF new goal  -LL    Row Name 11/29/22 1516 11/29/22 1415       Gait/Walking Locomotion Goal 1 (PT-IRF)    Activity/Assistive Device (Gait/Walking Locomotion Goal 1, PT-IRF) gait (walking locomotion);assistive device use  -RF gait (walking locomotion);assistive device use  -LL    Gait/Walking Locomotion Distance Goal 1 (PT-IRF) 100'  -'  -LL    [place] Level (Gait/Walking Locomotion Goal 1, PT-IRF) modified independence  -RF modified independence  -LL    Time Frame (Gait/Walking Locomotion Goal 1, PT-IRF) long-term goal (LTG)  -RF long-term goal (LTG)  -LL    Progress/Outcomes (Gait/Walking Locomotion Goal 1, PT-IRF) goal revised this date  -RF goal revised this date  -LL          User Key  (r) = Recorded By, (t) = Taken By, (c) = Cosigned By    Initials Name Provider Type    LL Sharita Mcdonnell, PTA Physical Therapist Assistant    RF Meron Lei PTA Physical Therapist Assistant              Wound 09/21/22 1532 Right anterior knee Incision (Active)   Dressing Appearance open to air 11/29/22 1053   Closure Approximated 11/29/22 1053   Base clean;dry 11/28/22 1920   Periwound intact 11/29/22 1053   Periwound Temperature warm 11/29/22 1053   Periwound Skin Turgor soft 11/29/22 1053   Drainage Amount none 11/28/22 1920   Care, Wound other (see comments) 11/28/22 1920   Dressing Care open to air 11/29/22 1053       Wound 10/28/22 1827 Right coccyx Pressure Injury (Active)   Wound Image     11/29/22 1440   Dressing Appearance open to air 11/29/22 1440   Closure None 11/28/22 1920   Base blanchable;pink;red 11/29/22 1440   Periwound intact 11/29/22 1440   Periwound Temperature warm 11/29/22 1440   Periwound Skin Turgor soft 11/29/22 1446    Drainage Amount none 11/28/22 1920   Care, Wound barrier applied 11/29/22 1440   Dressing Care open to air 11/29/22 1440     Physical Therapy Education     Title: PT OT SLP Therapies (Done)     Topic: Physical Therapy (Done)     Point: Mobility training (Done)     Learning Progress Summary           Patient Acceptance, E,TB, VU by RF at 11/29/2022 1520    Acceptance, E,D, VU,NR by LL at 11/29/2022 1423    Acceptance, E,D, VU,NR by RG at 11/28/2022 1126    Acceptance, E,TB, VU by RF at 11/26/2022 1512    Acceptance, E, VU,NR by LB at 11/25/2022 1548    Acceptance, E,TB, VU by RF at 11/25/2022 1547    Acceptance, E,TB, VU by DL at 11/24/2022 0154    Acceptance, E,TB, VU by RF at 11/23/2022 1417    Acceptance, E,TB, VU by DL at 11/23/2022 0029    Acceptance, E,TB, VU by RF at 11/22/2022 1609    Acceptance, E,TB, VU by DL at 11/22/2022 0442    Acceptance, E,TB, VU by DL at 11/22/2022 0429    Acceptance, E,TB, VU by RF at 11/21/2022 1522    Acceptance, E,TB, VU,DU by HR at 11/18/2022 1546    Acceptance, E,TB, VU by RF at 11/18/2022 1525    Acceptance, E,TB, VU by RF at 11/17/2022 1528    Acceptance, E,TB, VU by DL at 11/17/2022 0233    Acceptance, E,TB, VU by RF at 11/16/2022 1606    Acceptance, E,TB, VU by DL at 11/15/2022 2033    Acceptance, E,TB, VU by RF at 11/15/2022 1541    Acceptance, E,TB, VU by DL at 11/15/2022 0305    Acceptance, E,TB, VU by RF at 11/14/2022 1528    Acceptance, E,TB, VU by RF at 11/10/2022 1551    Acceptance, E,TB, VU by RF at 11/9/2022 1622    Acceptance, E,TB, VU by DG at 11/9/2022 0050    Acceptance, E,TB, VU by RF at 11/8/2022 1539    Acceptance, E,TB, VU by DG at 11/8/2022 0114    Acceptance, E,TB, VU by RF at 11/7/2022 1607    Acceptance, E,TB, VU by DG at 11/6/2022 2023    Acceptance, E,TB, VU by RF at 11/4/2022 1551    Acceptance, E,TB, VU,DU by HR at 11/3/2022 1541    Acceptance, E,D, VU,NR by RG at 11/3/2022 1442    Acceptance, E,TB, VU by DG at 11/1/2022 2016    Acceptance, E,D,  VU,NR by RG at 11/1/2022 1541    Acceptance, E,TB, VU by DG at 10/31/2022 2315    Acceptance, E,D, VU,NR by RG at 10/31/2022 1446                   Point: Home exercise program (Done)     Learning Progress Summary           Patient Acceptance, E,TB, VU by RF at 11/29/2022 1520    Acceptance, E,D, VU,NR by LL at 11/29/2022 1423    Acceptance, E,D, VU,NR by RG at 11/28/2022 1126    Acceptance, E,TB, VU by RF at 11/26/2022 1512    Acceptance, E, VU,NR by LB at 11/25/2022 1548    Acceptance, E,TB, VU by RF at 11/25/2022 1547    Acceptance, E,TB, VU by DL at 11/24/2022 0154    Acceptance, E,TB, VU by RF at 11/23/2022 1417    Acceptance, E,TB, VU by DL at 11/23/2022 0029    Acceptance, E,TB, VU by RF at 11/22/2022 1609    Acceptance, E,TB, VU by DL at 11/22/2022 0442    Acceptance, E,TB, VU by DL at 11/22/2022 0429    Acceptance, E,TB, VU by RF at 11/21/2022 1522    Acceptance, E,TB, VU,DU by HR at 11/18/2022 1546    Acceptance, E,TB, VU by RF at 11/18/2022 1525    Acceptance, E,TB, VU by RF at 11/17/2022 1528    Acceptance, E,TB, VU by DL at 11/17/2022 0233    Acceptance, E,TB, VU by RF at 11/16/2022 1606    Acceptance, E,TB, VU by DL at 11/15/2022 2033    Acceptance, E,TB, VU by RF at 11/15/2022 1541    Acceptance, E,TB, VU by DL at 11/15/2022 0305    Acceptance, E,TB, VU by RF at 11/14/2022 1528    Acceptance, E,TB, VU by RF at 11/10/2022 1551    Acceptance, E,TB, VU by RF at 11/9/2022 1622    Acceptance, E,TB, VU by DG at 11/9/2022 0050    Acceptance, E,TB, VU by RF at 11/8/2022 1539    Acceptance, E,TB, VU by DG at 11/8/2022 0114    Acceptance, E,TB, VU by RF at 11/7/2022 1607    Acceptance, E,TB, VU by DG at 11/6/2022 2023    Acceptance, E,TB, VU by RF at 11/4/2022 1551    Acceptance, E,TB, VU,DU by HR at 11/3/2022 1541    Acceptance, E,D, VU,NR by RG at 11/3/2022 1442    Acceptance, E,TB, VU by DG at 11/1/2022 2016    Acceptance, E,D, VU,NR by RG at 11/1/2022 1541    Acceptance, E,TB, VU by DG at 10/31/2022 231     Acceptance, E,D, VU,NR by RG at 10/31/2022 1446                   Point: Body mechanics (Done)     Learning Progress Summary           Patient Acceptance, E,TB, VU by RF at 11/29/2022 1520    Acceptance, E,D, VU,NR by LL at 11/29/2022 1423    Acceptance, E,D, VU,NR by RG at 11/28/2022 1126    Acceptance, E,TB, VU by RF at 11/26/2022 1512    Acceptance, E, VU,NR by LB at 11/25/2022 1548    Acceptance, E,TB, VU by RF at 11/25/2022 1547    Acceptance, E,TB, VU by DL at 11/24/2022 0154    Acceptance, E,TB, VU by RF at 11/23/2022 1417    Acceptance, E,TB, VU by DL at 11/23/2022 0029    Acceptance, E,TB, VU by RF at 11/22/2022 1609    Acceptance, E,TB, VU by DL at 11/22/2022 0442    Acceptance, E,TB, VU by DL at 11/22/2022 0429    Acceptance, E,TB, VU by RF at 11/21/2022 1522    Acceptance, E,TB, VU,DU by HR at 11/18/2022 1546    Acceptance, E,TB, VU by RF at 11/18/2022 1525    Acceptance, E,TB, VU by RF at 11/17/2022 1528    Acceptance, E,TB, VU by DL at 11/17/2022 0233    Acceptance, E,TB, VU by RF at 11/16/2022 1606    Acceptance, E,TB, VU by DL at 11/15/2022 2033    Acceptance, E,TB, VU by RF at 11/15/2022 1541    Acceptance, E,TB, VU by DL at 11/15/2022 0305    Acceptance, E,TB, VU by RF at 11/14/2022 1528    Acceptance, E,TB, VU by RF at 11/10/2022 1551    Acceptance, E,TB, VU by RF at 11/9/2022 1622    Acceptance, E,TB, VU by DG at 11/9/2022 0050    Acceptance, E,TB, VU by RF at 11/8/2022 1539    Acceptance, E,TB, VU by DG at 11/8/2022 0114    Acceptance, E,TB, VU by RF at 11/7/2022 1607    Acceptance, E,TB, VU by DG at 11/6/2022 2023    Acceptance, E,TB, VU by RF at 11/4/2022 1551    Acceptance, E,TB, VU,DU by HR at 11/3/2022 1541    Acceptance, E,D, VU,NR by RG at 11/3/2022 1442    Acceptance, E,TB, VU by DG at 11/1/2022 2016    Acceptance, E,D, VU,NR by RG at 11/1/2022 1541    Acceptance, E,TB, VU by DG at 10/31/2022 2315    Acceptance, E,D, VU,NR by RG at 10/31/2022 1446                   Point: Precautions  (Done)     Learning Progress Summary           Patient Acceptance, E,TB, VU by RF at 11/29/2022 1520    Acceptance, E,D, VU,NR by LL at 11/29/2022 1423    Acceptance, E,D, VU,NR by RG at 11/28/2022 1126    Acceptance, E,TB, VU by RF at 11/26/2022 1512    Acceptance, E, VU,NR by LB at 11/25/2022 1548    Acceptance, E,TB, VU by RF at 11/25/2022 1547    Acceptance, E,TB, VU by DL at 11/24/2022 0154    Acceptance, E,TB, VU by RF at 11/23/2022 1417    Acceptance, E,TB, VU by DL at 11/23/2022 0029    Acceptance, E,TB, VU by RF at 11/22/2022 1609    Acceptance, E,TB, VU by DL at 11/22/2022 0442    Acceptance, E,TB, VU by DL at 11/22/2022 0429    Acceptance, E,TB, VU by RF at 11/21/2022 1522    Acceptance, E,TB, VU,DU by HR at 11/18/2022 1546    Acceptance, E,TB, VU by RF at 11/18/2022 1525    Acceptance, E,TB, VU by RF at 11/17/2022 1528    Acceptance, E,TB, VU by DL at 11/17/2022 0233    Acceptance, E,TB, VU by RF at 11/16/2022 1606    Acceptance, E,TB, VU by DL at 11/15/2022 2033    Acceptance, E,TB, VU by RF at 11/15/2022 1541    Acceptance, E,TB, VU by DL at 11/15/2022 0305    Acceptance, E,TB, VU by RF at 11/14/2022 1528    Acceptance, E,TB, VU by RF at 11/10/2022 1551    Acceptance, E,TB, VU by RF at 11/9/2022 1622    Acceptance, E,TB, VU by DG at 11/9/2022 0050    Acceptance, E,TB, VU by RF at 11/8/2022 1539    Acceptance, E,TB, VU by DG at 11/8/2022 0114    Acceptance, E,TB, VU by RF at 11/7/2022 1607    Acceptance, E,TB, VU by DG at 11/6/2022 2023    Acceptance, E,TB, VU by RF at 11/4/2022 1551    Acceptance, E,TB, VU,DU by HR at 11/3/2022 1541    Acceptance, E,D, VU,NR by RG at 11/3/2022 1442    Acceptance, E,TB, VU by DG at 11/1/2022 2016    Acceptance, E,D, VU,NR by RG at 11/1/2022 1541    Acceptance, E,TB, VU by BRYSON at 10/31/2022 2315    Acceptance, E,D, VU,NR by RG at 10/31/2022 1446                               User Key     Initials Effective Dates Name Provider Type Discipline    BRYSON 06/16/21 -  Phyllis Yañez  LUCIO, RN Registered Nurse Nurse    LB 06/16/21 -  Willow Villalta, PT Physical Therapist PT    LL 05/02/16 -  Sharita Mcdonnell, LIZETT Physical Therapist Assistant PT    RF 06/16/21 -  Meron Lei PTA Physical Therapist Assistant PT    RG 06/16/21 -  Michi Mckeon, LIZETT Physical Therapist Assistant PT    HR 01/14/22 -  Hanna Murphy, LIZETT Physical Therapist Assistant PT    DL 03/16/22 -  Hellen Mcdonnell, RN Registered Nurse Nurse                PT Recommendation and Plan    Frequency of Treatment (PT): 5 times per week, 90 minutes per session     Daily Progress Summary (PT)  Functional Goal Overall Progress (PT): progressing toward functional goals slower than expected  Daily Progress Summary (PT): Progress hindered due to low back and R knee pain. Functional mobility fair; CGA required for safety.  Impairments Still Limiting Function (PT): functional activity tolerance impairment, pain, range of motion deficit, strength deficit  Recommendations (PT): Continue per current POC               Time Calculation:      PT Charges     Row Name 11/29/22 1522 11/29/22 1520 11/29/22 1423       Time Calculation    Start Time 1415  -RF 0800  -RF 1325  -LL    Stop Time 1430  -RF 0830  -RF 1410  -LL    Time Calculation (min) 15 min  -RF 30 min  -RF 45 min  -LL    PT Received On 11/29/22  -RF 11/29/22  -RF 11/29/22  -LL    PT - Next Appointment 11/30/22  -RF 11/29/22  -RF --    PT Goal Re-Cert Due Date 12/02/22  -RF 12/02/22  -RF --       Time Calculation- PT    Total Timed Code Minutes- PT 15 minute(s)  -RF 30 minute(s)  -RF 45 minute(s)  -LL          User Key  (r) = Recorded By, (t) = Taken By, (c) = Cosigned By    Initials Name Provider Type    LL Sharita Mcdonnell, LIZETT Physical Therapist Assistant    RF Meron Lei PTA Physical Therapist Assistant                Therapy Charges for Today     Code Description Service Date Service Provider Modifiers Qty    96682636771 HC PT THER PROC EA 15 MIN 11/29/2022 Meron Lei  LIZETT CAREY GP, CQ 2    [id number]  PT THERAPEUTIC ACT EA 15 MIN 11/29/2022 Meron Lei, LIZETT GP, CQ 1                   Meron Lei, LIZETT  11/29/2022

## 2022-11-30 ENCOUNTER — ANESTHESIA EVENT CONVERTED (OUTPATIENT)
Dept: ANESTHESIOLOGY | Facility: HOSPITAL | Age: 57
End: 2022-11-30

## 2022-11-30 ENCOUNTER — APPOINTMENT (OUTPATIENT)
Dept: GENERAL RADIOLOGY | Facility: HOSPITAL | Age: 57
End: 2022-11-30

## 2022-11-30 ENCOUNTER — HOSPITAL ENCOUNTER (INPATIENT)
Facility: HOSPITAL | Age: 57
LOS: 15 days | Discharge: NURSING, REHAB OR LONG-TERM CARE | End: 2022-12-15
Attending: ORTHOPAEDIC SURGERY | Admitting: ORTHOPAEDIC SURGERY

## 2022-11-30 ENCOUNTER — ANESTHESIA (OUTPATIENT)
Dept: PERIOP | Facility: HOSPITAL | Age: 57
End: 2022-11-30

## 2022-11-30 DIAGNOSIS — M00.9 INFECTION OF RIGHT KNEE: ICD-10-CM

## 2022-11-30 DIAGNOSIS — Z87.39 HISTORY OF OSTEOMYELITIS: ICD-10-CM

## 2022-11-30 PROBLEM — E03.9 HYPOTHYROID: Status: ACTIVE | Noted: 2022-11-30

## 2022-11-30 PROBLEM — Z96.659 S/P TOTAL KNEE ARTHROPLASTY: Status: ACTIVE | Noted: 2022-11-30

## 2022-11-30 LAB
GLUCOSE BLDC GLUCOMTR-MCNC: 194 MG/DL (ref 70–130)
GLUCOSE BLDC GLUCOMTR-MCNC: 197 MG/DL (ref 70–130)
GLUCOSE BLDC GLUCOMTR-MCNC: 260 MG/DL (ref 70–130)
GLUCOSE BLDC GLUCOMTR-MCNC: 324 MG/DL (ref 70–130)

## 2022-11-30 PROCEDURE — 87176 TISSUE HOMOGENIZATION CULTR: CPT | Performed by: ORTHOPAEDIC SURGERY

## 2022-11-30 PROCEDURE — 87015 SPECIMEN INFECT AGNT CONCNTJ: CPT | Performed by: ORTHOPAEDIC SURGERY

## 2022-11-30 PROCEDURE — 05HY33Z INSERTION OF INFUSION DEVICE INTO UPPER VEIN, PERCUTANEOUS APPROACH: ICD-10-PCS | Performed by: ORTHOPAEDIC SURGERY

## 2022-11-30 PROCEDURE — 25010000002 CEFAZOLIN IN DEXTROSE 2-4 GM/100ML-% SOLUTION: Performed by: ORTHOPAEDIC SURGERY

## 2022-11-30 PROCEDURE — C1776 JOINT DEVICE (IMPLANTABLE): HCPCS | Performed by: ORTHOPAEDIC SURGERY

## 2022-11-30 PROCEDURE — 25010000002 ROPIVACAINE PER 1 MG: Performed by: NURSE ANESTHETIST, CERTIFIED REGISTERED

## 2022-11-30 PROCEDURE — 73560 X-RAY EXAM OF KNEE 1 OR 2: CPT

## 2022-11-30 PROCEDURE — 25010000002 VANCOMYCIN 10 G RECONSTITUTED SOLUTION: Performed by: ORTHOPAEDIC SURGERY

## 2022-11-30 PROCEDURE — 25010000002 VANCOMYCIN 1 G RECONSTITUTED SOLUTION: Performed by: ORTHOPAEDIC SURGERY

## 2022-11-30 PROCEDURE — 87102 FUNGUS ISOLATION CULTURE: CPT | Performed by: ORTHOPAEDIC SURGERY

## 2022-11-30 PROCEDURE — 87205 SMEAR GRAM STAIN: CPT | Performed by: ORTHOPAEDIC SURGERY

## 2022-11-30 PROCEDURE — 82962 GLUCOSE BLOOD TEST: CPT

## 2022-11-30 PROCEDURE — 63710000001 INSULIN DETEMIR PER 5 UNITS: Performed by: NURSE PRACTITIONER

## 2022-11-30 PROCEDURE — 0SRC0EZ REPLACEMENT OF RIGHT KNEE JOINT WITH ARTICULATING SPACER, OPEN APPROACH: ICD-10-PCS | Performed by: ORTHOPAEDIC SURGERY

## 2022-11-30 PROCEDURE — 11983 REMOVE/INSERT DRUG IMPLANT: CPT | Performed by: PHYSICIAN ASSISTANT

## 2022-11-30 PROCEDURE — C1755 CATHETER, INTRASPINAL: HCPCS | Performed by: ORTHOPAEDIC SURGERY

## 2022-11-30 PROCEDURE — P9612 CATHETERIZE FOR URINE SPEC: HCPCS

## 2022-11-30 PROCEDURE — 87075 CULTR BACTERIA EXCEPT BLOOD: CPT | Performed by: ORTHOPAEDIC SURGERY

## 2022-11-30 PROCEDURE — 3E1U38Z IRRIGATION OF JOINTS USING IRRIGATING SUBSTANCE, PERCUTANEOUS APPROACH: ICD-10-PCS | Performed by: ORTHOPAEDIC SURGERY

## 2022-11-30 PROCEDURE — 87070 CULTURE OTHR SPECIMN AEROBIC: CPT | Performed by: ORTHOPAEDIC SURGERY

## 2022-11-30 PROCEDURE — 25010000002 DEXAMETHASONE PER 1 MG: Performed by: NURSE ANESTHETIST, CERTIFIED REGISTERED

## 2022-11-30 PROCEDURE — C1713 ANCHOR/SCREW BN/BN,TIS/BN: HCPCS | Performed by: ORTHOPAEDIC SURGERY

## 2022-11-30 PROCEDURE — 25010000002 PROPOFOL 10 MG/ML EMULSION: Performed by: NURSE ANESTHETIST, CERTIFIED REGISTERED

## 2022-11-30 PROCEDURE — 87206 SMEAR FLUORESCENT/ACID STAI: CPT | Performed by: ORTHOPAEDIC SURGERY

## 2022-11-30 PROCEDURE — 25010000002 HYDROMORPHONE PER 4 MG: Performed by: ORTHOPAEDIC SURGERY

## 2022-11-30 PROCEDURE — 63710000001 INSULIN LISPRO (HUMAN) PER 5 UNITS: Performed by: NURSE PRACTITIONER

## 2022-11-30 PROCEDURE — 0 TOBRAMYCIN PER 80 MG: Performed by: ORTHOPAEDIC SURGERY

## 2022-11-30 PROCEDURE — 87116 MYCOBACTERIA CULTURE: CPT | Performed by: ORTHOPAEDIC SURGERY

## 2022-11-30 DEVICE — DEV CONTRL TISS STRATAFIX SPIRAL PDO BIDIR 1 36X36CM: Type: IMPLANTABLE DEVICE | Site: KNEE | Status: FUNCTIONAL

## 2022-11-30 DEVICE — TIBIAL COMPONENT
Type: IMPLANTABLE DEVICE | Site: KNEE | Status: FUNCTIONAL
Brand: TRIATHLON

## 2022-11-30 DEVICE — CRUCIATE RETAINING FEMORAL
Type: IMPLANTABLE DEVICE | Site: KNEE | Status: FUNCTIONAL
Brand: TRIATHLON

## 2022-11-30 DEVICE — CMT BONE SIMPLEX/P FULL DOSE 10/PK: Type: IMPLANTABLE DEVICE | Site: KNEE | Status: FUNCTIONAL

## 2022-11-30 RX ORDER — CYCLOBENZAPRINE HCL 5 MG
5 TABLET ORAL 3 TIMES DAILY PRN
Status: DISCONTINUED | OUTPATIENT
Start: 2022-11-30 | End: 2022-12-15 | Stop reason: HOSPADM

## 2022-11-30 RX ORDER — DIPHENHYDRAMINE HYDROCHLORIDE 50 MG/ML
25 INJECTION INTRAMUSCULAR; INTRAVENOUS EVERY 6 HOURS PRN
Status: DISCONTINUED | OUTPATIENT
Start: 2022-11-30 | End: 2022-12-15 | Stop reason: HOSPADM

## 2022-11-30 RX ORDER — SODIUM CHLORIDE, SODIUM LACTATE, POTASSIUM CHLORIDE, CALCIUM CHLORIDE 600; 310; 30; 20 MG/100ML; MG/100ML; MG/100ML; MG/100ML
100 INJECTION, SOLUTION INTRAVENOUS CONTINUOUS
Status: DISCONTINUED | OUTPATIENT
Start: 2022-11-30 | End: 2022-12-11

## 2022-11-30 RX ORDER — PANTOPRAZOLE SODIUM 40 MG/1
40 TABLET, DELAYED RELEASE ORAL
Status: DISCONTINUED | OUTPATIENT
Start: 2022-12-01 | End: 2022-12-15 | Stop reason: HOSPADM

## 2022-11-30 RX ORDER — MIDAZOLAM HYDROCHLORIDE 1 MG/ML
1 INJECTION INTRAMUSCULAR; INTRAVENOUS
Status: DISCONTINUED | OUTPATIENT
Start: 2022-11-30 | End: 2022-11-30 | Stop reason: HOSPADM

## 2022-11-30 RX ORDER — MELOXICAM 15 MG/1
15 TABLET ORAL DAILY
Status: DISCONTINUED | OUTPATIENT
Start: 2022-12-01 | End: 2022-12-15 | Stop reason: HOSPADM

## 2022-11-30 RX ORDER — OXYCODONE HYDROCHLORIDE 5 MG/1
10 TABLET ORAL EVERY 4 HOURS PRN
Status: DISPENSED | OUTPATIENT
Start: 2022-11-30 | End: 2022-12-10

## 2022-11-30 RX ORDER — INSULIN LISPRO 100 [IU]/ML
0-7 INJECTION, SOLUTION INTRAVENOUS; SUBCUTANEOUS
Status: DISCONTINUED | OUTPATIENT
Start: 2022-11-30 | End: 2022-12-15 | Stop reason: HOSPADM

## 2022-11-30 RX ORDER — FAMOTIDINE 20 MG/1
20 TABLET, FILM COATED ORAL ONCE
Status: COMPLETED | OUTPATIENT
Start: 2022-11-30 | End: 2022-11-30

## 2022-11-30 RX ORDER — MAGNESIUM HYDROXIDE 1200 MG/15ML
LIQUID ORAL AS NEEDED
Status: DISCONTINUED | OUTPATIENT
Start: 2022-11-30 | End: 2022-11-30 | Stop reason: HOSPADM

## 2022-11-30 RX ORDER — FENTANYL CITRATE 50 UG/ML
50 INJECTION, SOLUTION INTRAMUSCULAR; INTRAVENOUS
Status: DISCONTINUED | OUTPATIENT
Start: 2022-11-30 | End: 2022-11-30

## 2022-11-30 RX ORDER — BUPIVACAINE HYDROCHLORIDE 5 MG/ML
INJECTION, SOLUTION PERINEURAL
Status: COMPLETED | OUTPATIENT
Start: 2022-11-30 | End: 2022-11-30

## 2022-11-30 RX ORDER — CEFAZOLIN SODIUM 2 G/100ML
2 INJECTION, SOLUTION INTRAVENOUS ONCE
Status: COMPLETED | OUTPATIENT
Start: 2022-11-30 | End: 2022-11-30

## 2022-11-30 RX ORDER — SODIUM CHLORIDE 0.9 % (FLUSH) 0.9 %
10 SYRINGE (ML) INJECTION EVERY 12 HOURS SCHEDULED
Status: DISCONTINUED | OUTPATIENT
Start: 2022-11-30 | End: 2022-11-30 | Stop reason: HOSPADM

## 2022-11-30 RX ORDER — VANCOMYCIN HYDROCHLORIDE 1 G/20ML
INJECTION, POWDER, LYOPHILIZED, FOR SOLUTION INTRAVENOUS AS NEEDED
Status: DISCONTINUED | OUTPATIENT
Start: 2022-11-30 | End: 2022-11-30 | Stop reason: HOSPADM

## 2022-11-30 RX ORDER — POLYETHYLENE GLYCOL 3350 17 G/17G
17 POWDER, FOR SOLUTION ORAL DAILY
Status: DISCONTINUED | OUTPATIENT
Start: 2022-11-30 | End: 2022-12-15 | Stop reason: HOSPADM

## 2022-11-30 RX ORDER — MIDODRINE HYDROCHLORIDE 5 MG/1
2.5 TABLET ORAL
Status: DISCONTINUED | OUTPATIENT
Start: 2022-11-30 | End: 2022-12-15 | Stop reason: HOSPADM

## 2022-11-30 RX ORDER — DIPHENHYDRAMINE HCL 25 MG
25 CAPSULE ORAL EVERY 6 HOURS PRN
Status: DISCONTINUED | OUTPATIENT
Start: 2022-11-30 | End: 2022-12-15 | Stop reason: HOSPADM

## 2022-11-30 RX ORDER — INSULIN LISPRO 100 [IU]/ML
4 INJECTION, SOLUTION INTRAVENOUS; SUBCUTANEOUS
Status: DISCONTINUED | OUTPATIENT
Start: 2022-11-30 | End: 2022-12-01

## 2022-11-30 RX ORDER — MELOXICAM 15 MG/1
15 TABLET ORAL ONCE
Status: COMPLETED | OUTPATIENT
Start: 2022-11-30 | End: 2022-11-30

## 2022-11-30 RX ORDER — PREGABALIN 75 MG/1
75 CAPSULE ORAL ONCE
Status: COMPLETED | OUTPATIENT
Start: 2022-11-30 | End: 2022-11-30

## 2022-11-30 RX ORDER — HYDROMORPHONE HYDROCHLORIDE 1 MG/ML
0.5 INJECTION, SOLUTION INTRAMUSCULAR; INTRAVENOUS; SUBCUTANEOUS
Status: DISCONTINUED | OUTPATIENT
Start: 2022-11-30 | End: 2022-11-30

## 2022-11-30 RX ORDER — OXYCODONE HYDROCHLORIDE 5 MG/1
5 TABLET ORAL EVERY 4 HOURS PRN
Status: DISPENSED | OUTPATIENT
Start: 2022-11-30 | End: 2022-12-10

## 2022-11-30 RX ORDER — HYDROMORPHONE HYDROCHLORIDE 1 MG/ML
0.5 INJECTION, SOLUTION INTRAMUSCULAR; INTRAVENOUS; SUBCUTANEOUS
Status: DISPENSED | OUTPATIENT
Start: 2022-11-30 | End: 2022-12-10

## 2022-11-30 RX ORDER — LABETALOL HYDROCHLORIDE 5 MG/ML
10 INJECTION, SOLUTION INTRAVENOUS EVERY 4 HOURS PRN
Status: DISCONTINUED | OUTPATIENT
Start: 2022-11-30 | End: 2022-12-15 | Stop reason: HOSPADM

## 2022-11-30 RX ORDER — KETOROLAC TROMETHAMINE 15 MG/ML
15 INJECTION, SOLUTION INTRAMUSCULAR; INTRAVENOUS EVERY 6 HOURS PRN
Status: ACTIVE | OUTPATIENT
Start: 2022-11-30 | End: 2022-12-04

## 2022-11-30 RX ORDER — TRAMADOL HYDROCHLORIDE 50 MG/1
50 TABLET ORAL EVERY 8 HOURS PRN
Status: DISPENSED | OUTPATIENT
Start: 2022-11-30 | End: 2022-12-07

## 2022-11-30 RX ORDER — NICOTINE POLACRILEX 4 MG
15 LOZENGE BUCCAL
Status: DISCONTINUED | OUTPATIENT
Start: 2022-11-30 | End: 2022-12-15 | Stop reason: HOSPADM

## 2022-11-30 RX ORDER — ASPIRIN 81 MG/1
81 TABLET ORAL EVERY 12 HOURS SCHEDULED
Status: DISCONTINUED | OUTPATIENT
Start: 2022-12-01 | End: 2022-12-15 | Stop reason: HOSPADM

## 2022-11-30 RX ORDER — BUPIVACAINE HYDROCHLORIDE 2.5 MG/ML
INJECTION, SOLUTION EPIDURAL; INFILTRATION; INTRACAUDAL
Status: COMPLETED | OUTPATIENT
Start: 2022-11-30 | End: 2022-11-30

## 2022-11-30 RX ORDER — LEVOTHYROXINE SODIUM 0.03 MG/1
25 TABLET ORAL
Status: DISCONTINUED | OUTPATIENT
Start: 2022-12-01 | End: 2022-12-15 | Stop reason: HOSPADM

## 2022-11-30 RX ORDER — SODIUM CHLORIDE, SODIUM LACTATE, POTASSIUM CHLORIDE, CALCIUM CHLORIDE 600; 310; 30; 20 MG/100ML; MG/100ML; MG/100ML; MG/100ML
9 INJECTION, SOLUTION INTRAVENOUS CONTINUOUS
Status: DISCONTINUED | OUTPATIENT
Start: 2022-11-30 | End: 2022-12-11

## 2022-11-30 RX ORDER — ACETAMINOPHEN 500 MG
1000 TABLET ORAL ONCE
Status: COMPLETED | OUTPATIENT
Start: 2022-11-30 | End: 2022-11-30

## 2022-11-30 RX ORDER — SODIUM CHLORIDE 0.9 % (FLUSH) 0.9 %
10 SYRINGE (ML) INJECTION AS NEEDED
Status: DISCONTINUED | OUTPATIENT
Start: 2022-11-30 | End: 2022-11-30 | Stop reason: HOSPADM

## 2022-11-30 RX ORDER — TRANEXAMIC ACID 10 MG/ML
1000 INJECTION, SOLUTION INTRAVENOUS ONCE
Status: COMPLETED | OUTPATIENT
Start: 2022-11-30 | End: 2022-11-30

## 2022-11-30 RX ORDER — NALOXONE HCL 0.4 MG/ML
0.1 VIAL (ML) INJECTION
Status: DISCONTINUED | OUTPATIENT
Start: 2022-11-30 | End: 2022-12-15 | Stop reason: HOSPADM

## 2022-11-30 RX ORDER — ROPIVACAINE HYDROCHLORIDE 2 MG/ML
INJECTION, SOLUTION EPIDURAL; INFILTRATION; PERINEURAL CONTINUOUS
Status: DISCONTINUED | OUTPATIENT
Start: 2022-11-30 | End: 2022-12-15 | Stop reason: HOSPADM

## 2022-11-30 RX ORDER — ONDANSETRON 2 MG/ML
4 INJECTION INTRAMUSCULAR; INTRAVENOUS EVERY 6 HOURS PRN
Status: DISCONTINUED | OUTPATIENT
Start: 2022-11-30 | End: 2022-12-15 | Stop reason: HOSPADM

## 2022-11-30 RX ORDER — LIDOCAINE HYDROCHLORIDE 10 MG/ML
0.5 INJECTION, SOLUTION EPIDURAL; INFILTRATION; INTRACAUDAL; PERINEURAL ONCE AS NEEDED
Status: COMPLETED | OUTPATIENT
Start: 2022-11-30 | End: 2022-11-30

## 2022-11-30 RX ORDER — SODIUM CHLORIDE 9 MG/ML
40 INJECTION, SOLUTION INTRAVENOUS AS NEEDED
Status: DISCONTINUED | OUTPATIENT
Start: 2022-11-30 | End: 2022-11-30 | Stop reason: HOSPADM

## 2022-11-30 RX ORDER — LIDOCAINE HYDROCHLORIDE 10 MG/ML
INJECTION, SOLUTION EPIDURAL; INFILTRATION; INTRACAUDAL; PERINEURAL AS NEEDED
Status: DISCONTINUED | OUTPATIENT
Start: 2022-11-30 | End: 2022-11-30 | Stop reason: SURG

## 2022-11-30 RX ORDER — ONDANSETRON 4 MG/1
4 TABLET, FILM COATED ORAL EVERY 6 HOURS PRN
Status: DISCONTINUED | OUTPATIENT
Start: 2022-11-30 | End: 2022-12-15 | Stop reason: HOSPADM

## 2022-11-30 RX ORDER — TOBRAMYCIN 1.2 G/30ML
INJECTION, POWDER, LYOPHILIZED, FOR SOLUTION INTRAVENOUS AS NEEDED
Status: DISCONTINUED | OUTPATIENT
Start: 2022-11-30 | End: 2022-11-30 | Stop reason: HOSPADM

## 2022-11-30 RX ORDER — ONDANSETRON 2 MG/ML
4 INJECTION INTRAMUSCULAR; INTRAVENOUS EVERY 6 HOURS PRN
Status: DISCONTINUED | OUTPATIENT
Start: 2022-11-30 | End: 2022-11-30 | Stop reason: SDUPTHER

## 2022-11-30 RX ORDER — CEFAZOLIN SODIUM 2 G/100ML
2 INJECTION, SOLUTION INTRAVENOUS EVERY 8 HOURS
Status: COMPLETED | OUTPATIENT
Start: 2022-11-30 | End: 2022-12-01

## 2022-11-30 RX ORDER — PROPOFOL 10 MG/ML
VIAL (ML) INTRAVENOUS AS NEEDED
Status: DISCONTINUED | OUTPATIENT
Start: 2022-11-30 | End: 2022-11-30 | Stop reason: SURG

## 2022-11-30 RX ORDER — DEXTROSE MONOHYDRATE 25 G/50ML
25 INJECTION, SOLUTION INTRAVENOUS
Status: DISCONTINUED | OUTPATIENT
Start: 2022-11-30 | End: 2022-12-15 | Stop reason: HOSPADM

## 2022-11-30 RX ORDER — PREGABALIN 150 MG/1
150 CAPSULE ORAL EVERY 12 HOURS SCHEDULED
Status: DISCONTINUED | OUTPATIENT
Start: 2022-11-30 | End: 2022-12-15 | Stop reason: HOSPADM

## 2022-11-30 RX ORDER — ACETAMINOPHEN 500 MG
1000 TABLET ORAL EVERY 8 HOURS SCHEDULED
Status: DISCONTINUED | OUTPATIENT
Start: 2022-11-30 | End: 2022-12-15 | Stop reason: HOSPADM

## 2022-11-30 RX ORDER — DEXAMETHASONE SODIUM PHOSPHATE 4 MG/ML
INJECTION, SOLUTION INTRA-ARTICULAR; INTRALESIONAL; INTRAMUSCULAR; INTRAVENOUS; SOFT TISSUE AS NEEDED
Status: DISCONTINUED | OUTPATIENT
Start: 2022-11-30 | End: 2022-11-30 | Stop reason: SURG

## 2022-11-30 RX ADMIN — BUPIVACAINE HYDROCHLORIDE 2 ML: 5 INJECTION, SOLUTION PERINEURAL at 10:48

## 2022-11-30 RX ADMIN — FAMOTIDINE 20 MG: 20 TABLET ORAL at 08:58

## 2022-11-30 RX ADMIN — LIDOCAINE HYDROCHLORIDE 50 MG: 10 INJECTION, SOLUTION EPIDURAL; INFILTRATION; INTRACAUDAL; PERINEURAL at 10:45

## 2022-11-30 RX ADMIN — ACETAMINOPHEN 1000 MG: 500 TABLET, FILM COATED ORAL at 21:47

## 2022-11-30 RX ADMIN — TRANEXAMIC ACID 1000 MG: 10 INJECTION, SOLUTION INTRAVENOUS at 12:23

## 2022-11-30 RX ADMIN — INSULIN DETEMIR 17 UNITS: 100 INJECTION, SOLUTION SUBCUTANEOUS at 21:47

## 2022-11-30 RX ADMIN — SODIUM CHLORIDE, POTASSIUM CHLORIDE, SODIUM LACTATE AND CALCIUM CHLORIDE: 600; 310; 30; 20 INJECTION, SOLUTION INTRAVENOUS at 12:19

## 2022-11-30 RX ADMIN — DEXAMETHASONE SODIUM PHOSPHATE 8 MG: 4 INJECTION, SOLUTION INTRAMUSCULAR; INTRAVENOUS at 10:50

## 2022-11-30 RX ADMIN — ACETAMINOPHEN 1000 MG: 500 TABLET ORAL at 08:58

## 2022-11-30 RX ADMIN — BUPIVACAINE HYDROCHLORIDE 15 ML: 2.5 INJECTION, SOLUTION EPIDURAL; INFILTRATION; INTRACAUDAL at 13:17

## 2022-11-30 RX ADMIN — SODIUM CHLORIDE, POTASSIUM CHLORIDE, SODIUM LACTATE AND CALCIUM CHLORIDE 100 ML/HR: 600; 310; 30; 20 INJECTION, SOLUTION INTRAVENOUS at 14:59

## 2022-11-30 RX ADMIN — TRANEXAMIC ACID 1000 MG: 10 INJECTION, SOLUTION INTRAVENOUS at 10:50

## 2022-11-30 RX ADMIN — INSULIN LISPRO 4 UNITS: 100 INJECTION, SOLUTION INTRAVENOUS; SUBCUTANEOUS at 18:56

## 2022-11-30 RX ADMIN — LIDOCAINE HYDROCHLORIDE 0.5 ML: 10 INJECTION, SOLUTION EPIDURAL; INFILTRATION; INTRACAUDAL; PERINEURAL at 08:58

## 2022-11-30 RX ADMIN — VANCOMYCIN HYDROCHLORIDE 1250 MG: 10 INJECTION, POWDER, LYOPHILIZED, FOR SOLUTION INTRAVENOUS at 09:02

## 2022-11-30 RX ADMIN — POLYETHYLENE GLYCOL 3350 17 G: 17 POWDER, FOR SOLUTION ORAL at 18:56

## 2022-11-30 RX ADMIN — SODIUM CHLORIDE, POTASSIUM CHLORIDE, SODIUM LACTATE AND CALCIUM CHLORIDE 9 ML/HR: 600; 310; 30; 20 INJECTION, SOLUTION INTRAVENOUS at 08:58

## 2022-11-30 RX ADMIN — MELOXICAM 15 MG: 15 TABLET ORAL at 08:58

## 2022-11-30 RX ADMIN — OXYCODONE 5 MG: 5 TABLET ORAL at 19:14

## 2022-11-30 RX ADMIN — PREGABALIN 150 MG: 150 CAPSULE ORAL at 21:47

## 2022-11-30 RX ADMIN — Medication 1000 MG: at 13:57

## 2022-11-30 RX ADMIN — PROPOFOL 100 MCG/KG/MIN: 10 INJECTION, EMULSION INTRAVENOUS at 10:50

## 2022-11-30 RX ADMIN — HYDROMORPHONE HYDROCHLORIDE 0.5 MG: 1 INJECTION, SOLUTION INTRAMUSCULAR; INTRAVENOUS; SUBCUTANEOUS at 21:50

## 2022-11-30 RX ADMIN — PROPOFOL 50 MG: 10 INJECTION, EMULSION INTRAVENOUS at 10:45

## 2022-11-30 RX ADMIN — MIDODRINE HYDROCHLORIDE 2.5 MG: 5 TABLET ORAL at 18:56

## 2022-11-30 RX ADMIN — CEFAZOLIN SODIUM 2 G: 2 INJECTION, SOLUTION INTRAVENOUS at 18:56

## 2022-11-30 RX ADMIN — PREGABALIN 75 MG: 75 CAPSULE ORAL at 08:58

## 2022-11-30 RX ADMIN — CEFAZOLIN SODIUM 2 G: 2 INJECTION, SOLUTION INTRAVENOUS at 10:45

## 2022-11-30 NOTE — ANESTHESIA POSTPROCEDURE EVALUATION
Patient: Melchor Hardwick III    Procedure Summary     Date: 11/30/22 Room / Location:  [facility] /  [facility]    Anesthesia Start: 1039 Anesthesia Stop: 1318    Procedure: ANTIBIOTIC SPACER PLACEMENT KNEE - RIGHT (Right: Knee) Diagnosis:     Surgeons: Brain Bentley MD Provider: Brian Boswell Jr., MD    Anesthesia Type: spinal ASA Status: 3          Anesthesia Type: spinal    Vitals  Vitals Value Taken Time   /77 11/30/22 1314   Temp     Pulse 80 11/30/22 1317   Resp     SpO2 99 % 11/30/22 1317   Vitals shown include unvalidated device data.        Post Anesthesia Care and Evaluation    Patient location during evaluation: PACU  Patient participation: complete - patient participated  Level of consciousness: awake and alert  Pain management: adequate    Airway patency: patent  Anesthetic complications: No anesthetic complications  PONV Status: none  Cardiovascular status: hemodynamically stable and acceptable  Respiratory status: nonlabored ventilation, acceptable and nasal cannula  Hydration status: acceptable

## 2022-11-30 NOTE — ANESTHESIA PROCEDURE NOTES
Spinal Block      Patient reassessed immediately prior to procedure    Patient location during procedure: OR  Indication:at surgeon's request  Performed By  CRNA/CAA: Alan Wilks CRNA  Preanesthetic Checklist  Completed: patient identified, IV checked, site marked, risks and benefits discussed, surgical consent, monitors and equipment checked, pre-op evaluation and timeout performed  Spinal Block Prep:  Patient Position:sitting  Sterile Tech:cap, gloves, sterile barriers and mask  Prep:Chloraprep  Patient Monitoring:blood pressure monitoring, continuous pulse oximetry and EKG    Spinal Block Procedure  Approach:midline  Guidance:landmark technique and palpation technique  Location:L4-L5  Needle Type:Quincke  Needle Gauge:22 G  Placement of Spinal needle event:cerebrospinal fluid aspirated  Paresthesia: no  Fluid Appearance:clear  Medications: bupivacaine (MARCAINE) 0.5 % injection - Injection   2 mL - 11/30/2022 10:48:00 AM   Post Assessment  Patient Tolerance:patient tolerated the procedure well with no apparent complications  Complications no  Additional Notes  Procedure:  Pt assisted to sitting position, with legs in position of comfort over side of bed.  Pt. instructed in optimal spine presentation, the spine was prepped/ Draped and the skin at insertion site was anesthetized with 1% Lidocaine 2 ml.  The spinal needle was then advanced until CSF flow was obtained and LA was injected:

## 2022-11-30 NOTE — SIGNIFICANT NOTE
11/30/22 1519   PT Discharge Summary   Reason for Discharge Discharge from facility   Outcomes Achieved Patient able to partially acheive established goals   Discharge Destination other (comment)  (Transfer to [facility] for surgical procedure.)

## 2022-11-30 NOTE — NURSING NOTE
PATIENT BEING WOKEN UP TO GET READY FOR DISCHARGE.  CHLORAHEXIDINE BATH TO BE GIVEN. PATIENT TO REMAIN NPO.  CLOTHES AND BELONGINGS TO BE PACKED DUE TO SISTER COMING TO GET PATIENT FOR DISCHARGE BY 5AM TO GO TO [facility] FOR SURGERY.

## 2022-11-30 NOTE — PAYOR COMM NOTE
"Jaja Ramirez, RN  Utilization Review Nurse for Inpatient Rehab  Phone: [phone]  Fax: [phone]  Email: [email or web address]  [facility]  Facility NPI: [id number]    DISCHARGE NOTIFICATION  Member: Melchor Hardwick  : 1965  Ref# [id number]  ID# [id number]  Admission Date: 10/28/22  Discharge Date:  22  Disposition:  To have surgery today 22 at [facility]    Melchor Hardwick III (57 y.o. Male)     Date of Birth   1965    Social Security Number       Address   [address] [address]    Home Phone   [phone]    MRN   [id number]       [facility]    Marital Status   Single                            Admission Date   10/28/22    Admission Type   Elective    Admitting Provider   Ja Bowers MD    Attending Provider       Department, Room/Bed   [facility] REHABILITATION, 109/2S       Discharge Date   2022    Discharge Disposition   [facility] ([facility] - External)    Discharge Destination                               Attending Provider: (none)   Allergies: No Known Allergies    Isolation: None   Infection: MRSA (22), MRSA No Isolation this Admit (22)   Code Status: Prior    Ht: 177.8 cm (70\")   Wt: 89.6 kg (197 lb 8.5 oz)    Admission Cmt: None   Principal Problem: Debility [R53.81]              Corrina Mendez MSW     Case Management  Significant Note     Signed  Date of Service:  22  Creation Time:  22     Signed                         22   Plan   Plan Spoke to sister Ros [phone] about how pt is doing in therapy, discharge on 22 to [facility] for surgery, and discharge plans.  Sister confirmed she will come to rehab at 5:00 am tomorrow to transport pt to [facility].   Patient/Family in Agreement with Plan yes                 Corrina Mendez MSW     Case Management  Significant Note     Signed  Date of " Service:  229  Creation Time:  22     Signed                         22 0925   Plan   Plan Team conference held today.  Pt is scheduled for surgery at [facility] on 22 arrive at 7:30 am, therefore, will be discharged on this date.  Spoke to pt about plans for discharge to [facility] on 22 for surgery to have an antibiotic spacer.  Pt says his sister Ros is suppose to come to rehab at 5:00 am on 22 to transport pt to [facility].   Pt will be discharged from rehab and admitted to [facility] on 22.   Patient/Family in Agreement with Plan yes                 Joe Mike MD   Physician  Medicine  Discharge Summary     Signed  Date of Service:  22  Creation Time:  22     Signed                                                                                                                                                                                                                                                                                                                                                                                                                                                                                                                       [facility] DISCHARGE SUMMARY     Patient Identification:  Name:  Melchor Hardwick III  Age:  57 y.o.  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     HOSPITAL COURSE  Patient is a 57 y.o. male presented to [facility] acute inpatient rehab in transfer from [facility].  Patient 57-year-old gentleman who had transmetatarsal amputation of his left foot in May of this year.  Patient states afterwards he was favoring his right leg and thought he was overworking the right knee and developed increased pain and swelling and erythema.  Patient was diagnosed with septic arthritis of the right knee and MRSA bacteremia.  Patient did have I&D of the knee and was taken to the OR for a washout.  Patient was placed on daptomycin for several weeks.  Did have PICC line placed and recommend another 6 to 8 weeks of IV antibiotics since admission to our service.  Patient was admitted for treatment of debility and help restore ambulation.     Right septic knee--again patient had been seen initially at [facility] in [place] where he had washout of the knee as well as continued IV antibiotic therapy.  Patient did have MRSA that was thought to be causative agent.  Patient did go to [facility] for continued IV antibiotic therapy and we continued patient on daptomycin since admission to our service.  Couple of weeks ago patient developed some increased swelling in the right knee we did obtain CT scan of the knee and patient had recurrence of effusion as well as findings consistent with osteomyelitis involving the lateral tibial plateau.  I did discuss the case with orthopedics here who recommended that he follow-up with his surgeon in [place].  Talking with Dr. Lopez he agreed to see patient in his office as he was not acutely septic.  And he did after evaluation recommended that he come back this week for antibiotic spacer placement.  Patient's been continued on IV daptomycin during  this time.     MRSA bacteremia/endocarditis--has been continued on daptomycin.  Did have TYRESE during the admission which again showed likely vegetation on the tricuspid leaflet.     Debility--at the time of admission patient was independent for bed mobility.  Patient unable to do transfers or ambulate secondary to orthostatic hypotension at the time of admission.  At the time of admission patient was requiring moderate assistance for bathing; set up for upper body dressing; moderate assistance for lower body dressing; set up for grooming; moderate assist for toileting.  At the time of discharge, requires minimum assist to contact-guard for toileting.  Contact-guard for transfers.  Set up for upper body and lower body dressing.  Ambulated 60 feet with a front wheel walker with standby assistance at the time of discharge.     Orthostatic hypotension patient did require patient being placed on midodrine and fludrocortisone.  Patient has responded well to this.  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     Diabetes mellitus well controlled throughout the admission.        VITAL SIGNS:  Temp:  [98.2 °F (36.8 °C)-98.3 °F (36.8 °C)] 98.3 °F (36.8 °C)  Heart Rate:  [75-89] 75  Resp:  [18] 18  BP: (122-143)/(73-84) 122/84  SpO2:  [96 %-99 %] 96 %  on   ;   Device (Oxygen Therapy): room air     Body mass index is 28.34 kg/m².      Wt Readings from Last 3 Encounters:   11/01/22 89.6 kg (197 lb 8.5 oz)   11/28/22 88.9 kg (196 lb)   09/23/22 98 kg (216 lb)         PHYSICAL EXAM:  Constitutional: No acute distress  HEENT: Normocephalic atraumatic  Neck: Supple  Cardiovascular: Regular rate and rhythm  Pulmonary/Chest: Clear to auscultation  Abdominal: Positive bowel sounds soft.   Musculoskeletal: Right knee--minimal swelling noted at this time; patient has transtarsal amputation of the left  foot  Neurological: Sensory changes stocking glove distribution  Skin: No rash  Peripheral vascular: No edema  Genitourinary::     DISCHARGE DISPOSITION   Stable     DISCHARGE MEDICATIONS:           Discharge Medications            New Medications      Instructions Start Date   acetaminophen 325 MG tablet  Commonly known as: TYLENOL    650 mg, Oral, Every 4 Hours PRN        ascorbic acid 500 MG tablet  Commonly known as: VITAMIN C    500 mg, Oral, Daily    Start Date: November 30, 2022      bisacodyl 10 MG suppository  Commonly known as: DULCOLAX    10 mg, Rectal, Daily PRN        cyclobenzaprine 5 MG tablet  Commonly known as: FLEXERIL    5 mg, Oral, 3 Times Daily PRN        daptomycin solution IVPB  Commonly known as: CUBICIN    500 mg, Intravenous, Every 24 Hours        docusate sodium 100 MG capsule    100 mg, Oral, 2 Times Daily        fludrocortisone 0.1 MG tablet    0.05 mg, Oral, Daily    Start Date: November 30, 2022      HYDROcodone-acetaminophen 5-325 MG per tablet  Commonly known as: NORCO    1 tablet, Oral, Every 8 Hours PRN        levothyroxine 25 MCG tablet  Commonly known as: SYNTHROID, LEVOTHROID    25 mcg, Oral, Every Early Morning    Start Date: November 30, 2022      magic barrier cream    1 application, Topical, As Needed        mupirocin 2 % ointment  Commonly known as: BACTROBAN    1 application, Topical, Every 12 Hours Scheduled        pantoprazole 40 MG EC tablet  Commonly known as: PROTONIX    40 mg, Oral, Every Early Morning    Start Date: November 30, 2022      polyethylene glycol 17 g packet  Commonly known as: MIRALAX    17 g, Oral, Daily    Start Date: November 30, 2022                    Changes to Medications      Instructions Start Date   Insulin Aspart 100 UNIT/ML injection  Commonly known as: novoLOG  What changed: how much to take    0-14 Units, Subcutaneous, 3 Times Daily Before Meals        Insulin Aspart 100 UNIT/ML injection  Commonly known as: novoLOG  What changed: You were  already taking a medication with the same name, and this prescription was added. Make sure you understand how and when to take each.    4 Units, Subcutaneous, 3 Times Daily Before Meals        insulin detemir 100 UNIT/ML injection  Commonly known as: LEVEMIR  What changed:   • how much to take  • when to take this    17 Units, Subcutaneous, Every 12 Hours Scheduled        pregabalin 100 MG capsule  Commonly known as: LYRICA  What changed:   • medication strength  • how much to take  • when to take this    100 mg, Oral, Every 12 Hours Scheduled                      Continue These Medications      Instructions Start Date   aspirin 81 MG EC tablet    81 mg, Oral, Daily        Diclofenac Sodium 1 % gel gel  Commonly known as: VOLTAREN    2 g, Topical, 4 Times Daily PRN        midodrine 2.5 MG tablet  Commonly known as: PROAMATINE    2.5 mg, Oral, 3 Times Daily Before Meals        multivitamin with minerals tablet tablet    1 tablet, Oral, Daily            Stop These Medications    atorvastatin 40 MG tablet  Commonly known as: LIPITOR      ferrous sulfate 325 (65 FE) MG tablet      Metamucil wafer wafer      metFORMIN 500 MG tablet  Commonly known as: GLUCOPHAGE      nabumetone 750 MG tablet  Commonly known as: RELAFEN      tiZANidine 4 MG tablet  Commonly known as: ZANAFLEX                        Your medication list              START taking these medications      Instructions Last Dose Given Next Dose Due   acetaminophen 325 MG tablet  Commonly known as: TYLENOL       Take 2 tablets by mouth Every 4 (Four) Hours As Needed for Mild Pain.          ascorbic acid 500 MG tablet  Commonly known as: VITAMIN C  Start taking on: November 30, 2022       Take 1 tablet by mouth Daily.          bisacodyl 10 MG suppository  Commonly known as: DULCOLAX       Insert 1 suppository into the rectum Daily As Needed for Constipation.          cyclobenzaprine 5 MG tablet  Commonly known as: FLEXERIL       Take 1 tablet by mouth 3 (Three)  Times a Day As Needed for Muscle Spasms.          daptomycin solution IVPB  Commonly known as: CUBICIN       Infuse 50 mL into a venous catheter Daily for 5 doses. Indications: Bacteria in the Blood          docusate sodium 100 MG capsule       Take 1 capsule by mouth 2 (Two) Times a Day.          fludrocortisone 0.1 MG tablet  Start taking on: November 30, 2022       Take 0.5 tablets by mouth Daily.          HYDROcodone-acetaminophen 5-325 MG per tablet  Commonly known as: NORCO       Take 1 tablet by mouth Every 8 (Eight) Hours As Needed for Moderate Pain for up to 4 days.          levothyroxine 25 MCG tablet  Commonly known as: SYNTHROID, LEVOTHROID  Start taking on: November 30, 2022       Take 1 tablet by mouth Every Morning.          magic barrier cream       Apply 1 application topically to the appropriate area as directed As Needed (skin irritation).          mupirocin 2 % ointment  Commonly known as: BACTROBAN       Apply 1 application topically to the appropriate area as directed Every 12 (Twelve) Hours.          pantoprazole 40 MG EC tablet  Commonly known as: PROTONIX  Start taking on: November 30, 2022       Take 1 tablet by mouth Every Morning.          polyethylene glycol 17 g packet  Commonly known as: MIRALAX  Start taking on: November 30, 2022       Take 17 g by mouth Daily.                            CHANGE how you take these medications      Instructions Last Dose Given Next Dose Due   Insulin Aspart 100 UNIT/ML injection  Commonly known as: novoLOG  What changed: how much to take       Inject 0-14 Units under the skin into the appropriate area as directed 3 (Three) Times a Day Before Meals.          Insulin Aspart 100 UNIT/ML injection  Commonly known as: novoLOG  What changed: You were already taking a medication with the same name, and this prescription was added. Make sure you understand how and when to take each.       Inject 4 Units under the skin into the appropriate area as directed 3  (Three) Times a Day Before Meals.          insulin detemir 100 UNIT/ML injection  Commonly known as: LEVEMIR  What changed:   • how much to take  • when to take this       Inject 17 Units under the skin into the appropriate area as directed Every 12 (Twelve) Hours.          pregabalin 100 MG capsule  Commonly known as: LYRICA  What changed:   • medication strength  • how much to take  • when to take this       Take 1 capsule by mouth Every 12 (Twelve) Hours.                            CONTINUE taking these medications      Instructions Last Dose Given Next Dose Due   aspirin 81 MG EC tablet       Take 1 tablet by mouth Daily.          Diclofenac Sodium 1 % gel gel  Commonly known as: VOLTAREN       Apply 2 g topically to the appropriate area as directed 4 (Four) Times a Day As Needed.          midodrine 2.5 MG tablet  Commonly known as: PROAMATINE       Take 2.5 mg by mouth 3 (Three) Times a Day Before Meals.          multivitamin with minerals tablet tablet       Take 1 tablet by mouth Daily.              STOP taking these medications    atorvastatin 40 MG tablet  Commonly known as: LIPITOR         ferrous sulfate 325 (65 FE) MG tablet         Metamucil wafer wafer         metFORMIN 500 MG tablet  Commonly known as: GLUCOPHAGE         nabumetone 750 MG tablet  Commonly known as: RELAFEN         tiZANidine 4 MG tablet  Commonly known as: ZANAFLEX                 Where to Get Your Medications       Information about where to get these medications is not yet available    Ask your nurse or doctor about these medications  • acetaminophen 325 MG tablet  • ascorbic acid 500 MG tablet  • bisacodyl 10 MG suppository  • cyclobenzaprine 5 MG tablet  • daptomycin solution IVPB  • docusate sodium 100 MG capsule  • fludrocortisone 0.1 MG tablet  • HYDROcodone-acetaminophen 5-325 MG per tablet  • Insulin Aspart 100 UNIT/ML injection  • Insulin Aspart 100 UNIT/ML injection  • insulin detemir 100 UNIT/ML injection  • levothyroxine  25 MCG tablet  • magic barrier cream   • mupirocin 2 % ointment  • pantoprazole 40 MG EC tablet  • polyethylene glycol 17 g packet  • pregabalin 100 MG capsule                Diet Instructions      Diet: Consistent Carbohydrate       Discharge Diet: Consistent Carbohydrate       No future appointments.         Follow-up Information           Gross, Missy, APRN. Go on 11/28/2022.    Specialties: Nurse Practitioner, Family Medicine  Why: follow up within 2 weeks of discharge.  Contact information:  [address] [place] [id number] [phone]                                 TEST  RESULTS PENDING AT DISCHARGE     CODE STATUS      Code Status and Medical Interventions:   Ordered at: 11/03/22 1015     Level Of Support Discussed With:     Patient     Code Status (Patient has no pulse and is not breathing):     CPR (Attempt to Resuscitate)     Medical Interventions (Patient has pulse or is breathing):     Full Support     Release to patient:     Routine Release         Joe Mike MD  [facility] Hospitalist  11/29/22  10:26 EST     Please note that this discharge summary required less than 30 minutes to complete.

## 2022-11-30 NOTE — THERAPY DISCHARGE NOTE
Inpatient Rehabilitation - IRF Occupational Therapy Treatment Note/Discharge  YVONNE Gaines     Patient Name: Melchor Hardwick III  : 1965  MRN: [id number]  Today's Date: 2022               Admit Date: 10/28/2022       ICD-10-CM ICD-9-CM   1. Staphylococcal arthritis of right knee (HCC)  M00.061 711.06     041.10   2. History of osteomyelitis  Z87.39 V13.59     Patient Active Problem List   Diagnosis   • Hyperlipidemia   • Hypertensive disorder   • Obesity   • Type 2 diabetes mellitus with hyperglycemia, with long-term current use of insulin (HCC)   • Gas gangrene of foot (HCC)   • Cellulitis in diabetic foot (HCC)   • Other acute osteomyelitis of left foot (HCC)   • Osteomyelitis (HCC)   • Critical illness myopathy   • Staphylococcal arthritis of right knee (HCC)   • Other cirrhosis of liver (HCC)   • MRSA bacteremia   • Endocarditis of tricuspid valve   • Wound of right buttock   • History of osteomyelitis   • Debility   • Infection of right knee (HCC)     Past Medical History:   Diagnosis Date   • Diabetes mellitus (HCC)     4 times per day gets checked for sugar at rehab   • Dizzy    • Hyperlipidemia    • Hypertension    • MRSA infection     blood -      • Orthostatic hypotension    • Pressure sore on buttocks     top crack -  sees wound - but now rn take care of it at rehab - minor opening - dime size - pat nurse didnt assess-  pt states getting better   • Pyogenic arthritis of right knee joint, due to unspecified organism (HCC) 2022   • Sepsis (HCC)    • Wears glasses     readers     Past Surgical History:   Procedure Laterality Date   • ABSCESS DRAINAGE  2004    PERINEUM   • AMPUTATION FOOT Left 2022    Procedure: AMPUTATION FOOT;  Surgeon: Addison Colby MD;  Location: [facility];  Service: Podiatry;  Laterality: Left;   • INCISION AND DRAINAGE LEG Left 05/10/2022    Procedure: INCISION AND DRAINAGE LOWER EXTREMITY;  Surgeon: Addison Colby MD;  Location: [facility];  Service:  Podiatry;  Laterality: Left;   • KNEE INCISION AND DRAINAGE Right 09/21/2022    Procedure: KNEE INCISION AND DRAINAGE RIGHT;  Surgeon: Brain Bentley MD;  Location:  [facility];  Service: Orthopedics;  Laterality: Right;   • PERIPHERALLY INSERTED CENTRAL CATHETER INSERTION Left    • WOUND DEBRIDEMENT Left 05/13/2022    Procedure: DEBRIDEMENT FOOT;  Surgeon: Addison Colby MD;  Location:  [facility];  Service: Podiatry;  Laterality: Left;       IRF OT ASSESSMENT FLOWSHEET (last 12 hours)     IRF OT Evaluation and Treatment     Row Name 11/30/22 1400          OT Time and Intention    Document Type discharge evaluation  -     Mode of Treatment individual therapy;occupational therapy  -     Row Name 11/30/22 1400          General Information    Patient/Family/Caregiver Comments/Observations patient discharged this am with assist of sister. patient to undergo surgical procedure for right knee today.  -     Existing Precautions/Restrictions fall  -           User Key  (r) = Recorded By, (t) = Taken By, (c) = Cosigned By    Initials Name Effective Dates     Jazmin Goldman, OT 06/16/21 -               Wound 10/28/22 1827 Right coccyx Pressure Injury (Active)   Wound Image     11/29/22 1440   Dressing Appearance dry;intact 11/30/22 1400   Closure None 11/29/22 1915   Base blanchable;pink;red 11/29/22 1915   Periwound intact 11/29/22 1440   Periwound Temperature warm 11/29/22 1440   Periwound Skin Turgor soft 11/29/22 1440   Drainage Amount none 11/29/22 1915   Care, Wound barrier applied;pressure removed 11/29/22 1915   Dressing Care open to air 11/29/22 1915       Wound 11/30/22 Right anterior knee Incision (Active)   Dressing Appearance dry;intact 11/30/22 1400   Closure Sutures 11/30/22 1119   Care, Wound irrigated with;sterile normal saline;pulsatile high pressure lavage;negative pressure wound therapy 11/30/22 1119   Dressing Care dressing applied 11/30/22 1119       NPWT (Negative Pressure Wound  Therapy) 11/30/22 right knee (Active)   Dressing unable to visualize 11/30/22 1400       Occupational Therapy Education     Title: PT OT SLP Therapies (Done)     Topic: Occupational Therapy (Done)     Point: ADL training (Done)     Description:   Instruct learner(s) on proper safety adaptation and remediation techniques during self care or transfers.   Instruct in proper use of assistive devices.              Learning Progress Summary           Patient Acceptance, E,TB, VU by DL at 11/24/2022 0154    Eager, E,TB,D, DU,NR by JW at 11/23/2022 1322    Acceptance, E,TB, VU by DL at 11/23/2022 0029    Acceptance, E,TB, VU by DL at 11/22/2022 0442    Acceptance, E,TB, VU by DL at 11/22/2022 0429    Acceptance, E, VU,NR by HB at 11/18/2022 1211    Acceptance, E,TB,D, VU,DU,NR by LA at 11/17/2022 1423    Acceptance, E,TB, VU by DL at 11/17/2022 0233    Acceptance, E, VU,NR by HB at 11/16/2022 1425    Acceptance, E,TB, VU by DL at 11/15/2022 2033    Acceptance, E, VU,NR by HB at 11/15/2022 1242    Acceptance, E,TB, VU by DL at 11/15/2022 0305    Acceptance, E,TB,D, VU,DU,NR by LA at 11/11/2022 1248    Acceptance, E, VU,NR by BF at 11/10/2022 1431    Acceptance, E, VU,NR by HB at 11/9/2022 1230    Acceptance, E,TB, VU by DG at 11/9/2022 0050    Acceptance, E, VU,NR by HB at 11/8/2022 1426    Acceptance, E,TB, VU by DG at 11/8/2022 0114    Acceptance, E, VU,NR by HB at 11/7/2022 1155    Acceptance, E,TB, VU by DG at 11/6/2022 2023    Acceptance, E, VU,NR by HB at 11/4/2022 1153    Acceptance, E, VU,NR by HB at 11/3/2022 1428    Acceptance, E,TB, VU by DG at 11/1/2022 2016    Acceptance, E,TB, VU by DG at 10/31/2022 2315    Acceptance, E, VU,NR by BF at 10/31/2022 1429    Acceptance, E,TB, VU by DL at 10/31/2022 0101    Acceptance, E,TB, VU by DL at 10/29/2022 2321    Acceptance, E, VU,NR by HB at 10/29/2022 1137                   Point: Home exercise program (Done)     Description:   Instruct learner(s) on appropriate  technique for monitoring, assisting and/or progressing therapeutic exercises/activities.              Learning Progress Summary           Patient Acceptance, E,TB, VU by DL at 11/24/2022 0154    Eager, E,TB,D, DU,NR by JW at 11/23/2022 1322    Acceptance, E,TB, VU by DL at 11/23/2022 0029    Acceptance, E,TB, VU by DL at 11/22/2022 0442    Acceptance, E,TB, VU by DL at 11/22/2022 0429    Acceptance, E, VU,NR by HB at 11/18/2022 1211    Acceptance, E,TB,D, VU,DU,NR by LA at 11/17/2022 1423    Acceptance, E,TB, VU by DL at 11/17/2022 0233    Acceptance, E, VU,NR by HB at 11/16/2022 1425    Acceptance, E,TB, VU by DL at 11/15/2022 2033    Acceptance, E, VU,NR by HB at 11/15/2022 1242    Acceptance, E,TB, VU by DL at 11/15/2022 0305    Acceptance, E,TB,D, VU,DU,NR by LA at 11/11/2022 1248    Acceptance, E, VU,NR by BF at 11/10/2022 1431    Acceptance, E, VU,NR by HB at 11/9/2022 1230    Acceptance, E,TB, VU by DG at 11/9/2022 0050    Acceptance, E, VU,NR by HB at 11/8/2022 1426    Acceptance, E,TB, VU by DG at 11/8/2022 0114    Acceptance, E, VU,NR by HB at 11/7/2022 1155    Acceptance, E,TB, VU by DG at 11/6/2022 2023    Acceptance, E, VU,NR by HB at 11/4/2022 1153    Acceptance, E, VU,NR by HB at 11/3/2022 1428    Acceptance, E,TB, VU by DG at 11/1/2022 2016    Acceptance, E,TB, VU by DG at 10/31/2022 2315    Acceptance, E,TB, VU by DL at 10/31/2022 0101    Acceptance, E,TB, VU by DL at 10/29/2022 2321    Acceptance, E, VU,NR by HB at 10/29/2022 1137                   Point: Precautions (Done)     Description:   Instruct learner(s) on prescribed precautions during self-care and functional transfers.              Learning Progress Summary           Patient Acceptance, E,TB, VU by DL at 11/24/2022 0154    Eager, E,TB,D, DU,NR by JW at 11/23/2022 1322    Acceptance, E,TB, VU by DL at 11/23/2022 0029    Acceptance, E,TB, VU by DL at 11/22/2022 0442    Acceptance, E,TB, VU by DL at 11/22/2022 0429    Acceptance, E, VU,NR  by HB at 11/18/2022 1211    Acceptance, E,TB,D, VU,DU,NR by LA at 11/17/2022 1423    Acceptance, E,TB, VU by DL at 11/17/2022 0233    Acceptance, E, VU,NR by HB at 11/16/2022 1425    Acceptance, E,TB, VU by DL at 11/15/2022 2033    Acceptance, E, VU,NR by HB at 11/15/2022 1242    Acceptance, E,TB, VU by DL at 11/15/2022 0305    Acceptance, E,TB,D, VU,DU,NR by LA at 11/11/2022 1248    Acceptance, E, VU,NR by HB at 11/9/2022 1230    Acceptance, E,TB, VU by DG at 11/9/2022 0050    Acceptance, E, VU,NR by HB at 11/8/2022 1426    Acceptance, E,TB, VU by DG at 11/8/2022 0114    Acceptance, E, VU,NR by HB at 11/7/2022 1155    Acceptance, E,TB, VU by DG at 11/6/2022 2023    Acceptance, E, VU,NR by HB at 11/4/2022 1153    Acceptance, E, VU,NR by HB at 11/3/2022 1428    Acceptance, E,TB, VU by DG at 11/1/2022 2016    Acceptance, E,TB, VU by DG at 10/31/2022 2315    Acceptance, E, VU,NR by BF at 10/31/2022 1429    Acceptance, E,TB, VU by DL at 10/31/2022 0101    Acceptance, E,TB, VU by DL at 10/29/2022 2321    Acceptance, E, VU,NR by HB at 10/29/2022 1137                   Point: Body mechanics (Done)     Description:   Instruct learner(s) on proper positioning and spine alignment during self-care, functional mobility activities and/or exercises.              Learning Progress Summary           Patient Acceptance, E,TB, VU by DL at 11/24/2022 0154    Eager, E,TB,D, DU,NR by JW at 11/23/2022 1322    Acceptance, E,TB, VU by DL at 11/23/2022 0029    Acceptance, E,TB, VU by DL at 11/22/2022 0442    Acceptance, E,TB, VU by DL at 11/22/2022 0429    Acceptance, E, VU,NR by HB at 11/18/2022 1211    Acceptance, E,TB,D, VU,DU,NR by LA at 11/17/2022 1423    Acceptance, E,TB, VU by DL at 11/17/2022 0233    Acceptance, E, VU,NR by HB at 11/16/2022 1425    Acceptance, E,TB, VU by DL at 11/15/2022 2033    Acceptance, E, VU,NR by HB at 11/15/2022 1242    Acceptance, E,TB, VU by DL at 11/15/2022 0305    Acceptance, E,TB,D, VU,DU,NR by LA at  11/11/2022 1248    Acceptance, E, VU,NR by HB at 11/9/2022 1230    Acceptance, E,TB, VU by DG at 11/9/2022 0050    Acceptance, E, VU,NR by HB at 11/8/2022 1426    Acceptance, E,TB, VU by DG at 11/8/2022 0114    Acceptance, E, VU,NR by HB at 11/7/2022 1155    Acceptance, E,TB, VU by DG at 11/6/2022 2023    Acceptance, E, VU,NR by HB at 11/4/2022 1153    Acceptance, E, VU,NR by HB at 11/3/2022 1428    Acceptance, E,TB, VU by DG at 11/1/2022 2016    Acceptance, E,TB, VU by DG at 10/31/2022 2315    Acceptance, E,TB, VU by DL at 10/31/2022 0101    Acceptance, E,TB, VU by DL at 10/29/2022 2321    Acceptance, E, VU,NR by HB at 10/29/2022 1137                               User Key     Initials Effective Dates Name Provider Type Discipline    DG 06/16/21 -  Phyllis Yañez, RN Registered Nurse Nurse     06/16/21 -  Mandi Smith OT Occupational Therapist OT    JW 06/16/21 -  Renny Kramer OTR Occupational Therapist OT    HB 05/25/21 -  Dorothy Rosas OT Occupational Therapist OT    LA 02/14/22 -  Jaqueline Bautista OT Occupational Therapist OT    DL 03/16/22 -  Hellen Mcdonnell RN Registered Nurse Nurse                OT Recommendation and Plan  Anticipated Discharge Disposition (OT): other (see comments) (acute [facility])  Planned Therapy Interventions (OT): activity tolerance training, adaptive equipment training, BADL retraining, IADL retraining, patient/caregiver education/training, strengthening exercise, transfer/mobility retraining           OT IRF GOALS     Row Name 11/30/22 1400             Transfer Goal 1 (OT-IRF)    Progress/Outcomes (Transfer Goal 1, OT-IRF) goal met  -AH         Transfer Goal 2 (OT-IRF)    Progress/Outcomes (Transfer Goal 2, OT-IRF) goal partially met  -AH         LB Dressing Goal 1 (OT-IRF)    Progress/Outcomes (LB Dressing Goal 1, OT-IRF) goal met  -AH         LB Dressing Goal 2 (OT-IRF)    Progress/Outcomes (LB Dressing Goal 2, OT-IRF) goal partially met  -AH         Toileting  Goal 1 (OT-IRF)    Progress/Outcomes (Toileting Goal 1, OT-IRF) goal met  -AH         Toileting Goal 2 (OT-IRF)    Progress/Outcomes (Toileting Goal 2, OT-IRF) goal met  -AH            User Key  (r) = Recorded By, (t) = Taken By, (c) = Cosigned By    Initials Name Provider Type    Jazmin Jorge OT Occupational Therapist                    Time Calculation:       Therapy Charges for Today     Code Description Service Date Service Provider Modifiers Qty    53411970131  OT THERAPEUTIC ACT EA 15 MIN 11/29/2022 Jazmin Goldman OT GO 2    [id number]  OT SELF CARE/MGMT/TRAIN EA 15 MIN 11/29/2022 Jazmin Goldman OT GO 1    [id number]  OT THER PROC EA 15 MIN 11/29/2022 Jazmin Goldman OT GO 3               OT Discharge Summary  Anticipated Discharge Disposition (OT): other (see comments) (acute care hospital)  Reason for Discharge: Discharge from facility  Discharge Destination: other (comment) (acute care hospital)    Jazmin Goldman OT  11/30/2022

## 2022-11-30 NOTE — ANESTHESIA PROCEDURE NOTES
Peripheral Block      Patient reassessed immediately prior to procedure    Reason for block: at surgeon's request and post-op pain management  Performed by  CRNA/CAA: Kaycee Royal CRNA  Assisted by: Ana Cristina Parker RN  Preanesthetic Checklist  Completed: patient identified, IV checked, site marked, risks and benefits discussed, surgical consent, monitors and equipment checked, pre-op evaluation and timeout performed  Prep:  Pt Position: supine  Sterile barriers:cap, gloves, mask, sterile barriers and washed/disinfected hands  Prep: ChloraPrep  Patient monitoring: blood pressure monitoring, continuous pulse oximetry and EKG  Procedure    Sedation: no  Performed under: spinal  Guidance:ultrasound guided    ULTRASOUND INTERPRETATION.  Using ultrasound guidance a 20 G gauge needle was placed in close proximity to the nerve, at which point, under ultrasound guidance anesthetic was injected in the area of the nerve and spread of the anesthesia was seen on ultrasound in close proximity thereto.  There were no abnormalities seen on ultrasound; a digital image was taken; and the patient tolerated the procedure with no complications. Images:still images obtained, printed/placed on chart    Laterality:right  Block Type:adductor canal block  Injection Technique:catheter  Needle Type:Tuohy and echogenic  Needle Gauge:18 G  Resistance on Injection: none  Catheter Size:20 G (20g)  Cath Depth at skin: 10 cm    Medications Used: bupivacaine PF (MARCAINE) 0.25 % injection - Injection   15 mL - 11/30/2022 1:17:00 PM      Post Assessment  Injection Assessment: negative aspiration for heme, incremental injection and no paresthesia on injection  Patient Tolerance:comfortable throughout block  Complications:no  Additional Notes  CATHETER   A high-frequency linear transducer, with sterile cover, was placed on the anterior mid-thigh (between the anterior superior iliac spine and patella). The transducer was then moved medially to  "identify the Sartorius muscle (Madhavi), Vastus Medialis muscle (VMM), Superficial Femoral Artery (SFA) and Vein. The transducer was then moved cephalad or caudad to position the SFA in the middle of the Madhavi. The insertion site was prepped and draped in sterile fashion. Skin and cutaneous tissue was infiltrated with 2-5 ml of 1% Lidocaine. Using ultrasound-guidance, an 18-gauge [employer] Ultra 360 Touhy needle was advanced in plane from lateral to medial. Preservative-free normal saline was utilized for hydro-dissection of tissue, advancement of Touhy, and to confirm needle placement below the fascial plane of the Madhavi where the Nerve to the VMM is located. Local anesthetic (LA) 5 ml deposited here. The Touhy needle continues its path lateral to the SFA at the level of the Saphenous Nerve. The remainder of the LA was deposited at the 10-11 o'clock position of the SFA. This injection created a space between the Madhavi and the SFA. Aspiration every 5 ml to prevent intravascular injection. Injection was completed with negative aspiration of blood and negative intravascular injection. Injection pressures were normal with minimal resistance. A 20-gauge [employer] Echo catheter was placed through the needle and advance out the tip of the Touhy 3-5 cm anterior to the SFA. The Touhy needle was then removed, and final catheter position verified at the 12 o'clock position to the SFA. The catheter was secured in the usual fashion with skin glue, benzoin, steri-strips, CHG tegaderm and label noting \"Nerve Block Catheter\". Jerk tape applied at yellow connector and catheter connection.           "

## 2022-11-30 NOTE — NURSING NOTE
PATIENT'S SISTER, AMENA ORDAZ, IN UNIT TO TRANSPORT PATIENT TO [place].  ASSISTED INTO WHEELCHAIR.  I HELPED PATIENT AND HIS SISTER OUT TO HER CAR WHERE PATIENT WAS SAFELY TRANSFERRED INTO VEHICLE.

## 2022-11-30 NOTE — PROGRESS NOTES
Patient Assessment Instrument  Quality Indicators - Discharge FY 2023    Section A. Transportation      Section A. Medication List        Section B. Health Literacy      Section C. BIMS      Section C. Signs and Symptoms of Delirium (from CAM)      Section D. Mood      Section D. Social Isolation      Section GG. Self-Care Performance     Eating: [place] sets up or cleans up; patient completes activity. [place] assists  only prior to or following the activity.   Oral Hygiene: [place] sets up or cleans up; patient completes activity. [place]  assists only prior to or following the activity.   Toileting Hygiene: : [place] does less than half the effort. [place] lifts, holds  or supports trunk or limbs but provides less than half the effort.   Shower/Bathe Self: [place] provides verbal cues and/or touching/steadying and/or  contact guard assistance as patient completes activity.   Upper Body Dressing: [place] sets up or cleans up; patient completes activity.  [place] assists only prior to or following the activity.   Lower Body Dressing: [place] does less than half the effort. [place] lifts, holds  or supports trunk or limbs but provides less than half the effort.   Putting On/Taking Off Footwear: [place] does less than half the effort. [place]  lifts, holds or supports trunk or limbs but provides less than half the effort.    Section GG. Mobility Performance      Section J. Health Conditions Discharge      Section J. Health Conditions (Pain)      Section K. Swallowing/Nutritional Status  Nutritional Approaches Past 7 Days:  Nutritional Approaches at Discharge:    Section M. Skin Conditions Discharge      . Current Number of Unhealed Pressure Ulcers      Section N. Medication        Section O. Special Treatments, Procedures, and Programs    Signed by: Lisa Goldman, Occupational Therapist

## 2022-11-30 NOTE — PROGRESS NOTES
Patient Assessment Instrument  Quality Indicators - Admission FY 2023    Section A. Ethnicity/ Race/Language  Ethnicity:  Race:  Preferred Language:    Section A. Transportation      Section B. Hearing and Vision        Section B. Health Literacy        Section C. Cognitive Patterns      Section C. Signs and Symptoms of Delirium (from CAM)      Section D. Mood      Section D. Social Isolation      Section [id number]. Prior Functioning      Section [id number]. Prior Device Use      Section [id number]. Self Care Performance      Section [id number]. Self Care Discharge Goals      Section [id number]. Mobility Performance     Roll Left and Right: Patient completed the activities by themself with no  assistance from a helper.   Sit to Lying: Patient completed the activities by themself with no assistance  from a helper.   Lying to Sitting on Side of Bed: [place] does less than half the effort. [place]  lifts, holds or supports trunk or limbs but provides less than half the effort.   Sit to Stand: [place] provides verbal cues and/or touching/steadying and/or  contact guard assistance as patient completes activity. Assistance may be  provided throughout the activity or intermittently.   Chair/Bed to Chair Transfer: [place] provides verbal cues and/or  touching/steadying and/or contact guard assistance as patient completes  activity. Assistance may be provided throughout the activity or intermittently.   Toilet Transfer [place] provides verbal cues and/or touching/steadying and/or  contact guard assistance as patient completes activity. Assistance may be  provided throughout the activity or intermittently.   Car Transfer: [place] provides verbal cues and/or touching/steadying and/or  contact guard assistance as patient completes activity. Assistance may be  provided throughout the activity or intermittently.   Walk 10 Feet:   [place] provides verbal cues and/or touching/steadying and/or  contact guard assistance as patient completes activity.  Assistance may be  provided throughout the activity or intermittently.  Walk 50 Feet with 2 Turns:   [place] provides verbal cues and/or  touching/steadying and/or contact guard assistance as patient completes  activity. Assistance may be provided throughout the activity or intermittently.  Walk 150 Feet:   Not attempted due to medical or safety concerns.  Walking 10 Feet on Uneven Surfaces:   Not attempted due to medical or safety  concerns.  1 Step Over Curb or Up/Down Stair:   Not attempted due to medical or safety  concerns.  Picking up an Object:   Not attempted due to medical or safety concerns.  Uses Wheelchair/Scooter: Yes  Wheel 50 Feet with Two Turns: Patient completed the activities by themself with  no assistance from a helper.  Type of Wheelchair or Scooter Used: Manual  Wheel 150 Feet: Patient completed the activities by themself with no assistance  from a helper.  Type of Wheelchair/Scooter Used: Manual    Section [id number]. Mobility Discharge Goals      Section H. Bladder and Bowel      Section I. Active Diagnosis      Section J. Health Conditions      Section J. Health Conditions (Pain)      Section K. Swallowing/Nutritional Status    Nutritional Approaches on Admission:    Section M. Skin Conditions      Section N. Medication        Section O. Special Treatments, Procedures, and Programs    Signed by: Meron Lei PTA

## 2022-11-30 NOTE — ANESTHESIA PREPROCEDURE EVALUATION
Anesthesia Evaluation     Patient summary reviewed and Nursing notes reviewed   no history of anesthetic complications:  NPO Solid Status: > 8 hours  NPO Liquid Status: > 2 hours           Airway   Mallampati: II  TM distance: >3 FB  Neck ROM: full  No difficulty expected  Dental    (+) poor dentition    Pulmonary - negative pulmonary ROS and normal exam   (-) not a smoker  Cardiovascular - normal exam    ECG reviewed    (+) hypertension, hyperlipidemia,   (-) CHF    ROS comment: Recent TYRESE reviewed    Neuro/Psych  (-) seizures, CVA  GI/Hepatic/Renal/Endo    (+) obesity,   renal disease CRI, diabetes mellitus,     Musculoskeletal     Abdominal    Substance History      OB/GYN          Other   arthritis,                        Anesthesia Plan    ASA 3     spinal     intravenous induction     Anesthetic plan, risks, benefits, and alternatives have been provided, discussed and informed consent has been obtained with: patient.    Use of blood products discussed with patient  Consented to blood products.   Plan discussed with CRNA.        CODE STATUS:

## 2022-11-30 NOTE — PROGRESS NOTES
Occupational Therapy:    Physical Therapy: Individual: 0 minutes.    Speech Language Pathology:    Signed by: Meron Lei PTA

## 2022-11-30 NOTE — PAYOR COMM NOTE
"Jaja Ramirez, RN  Utilization Review Nurse for Inpatient Rehab  Phone: [phone]  Fax: [phone]  Email: [email or web address]  [facility]  Facility NPI: [id number]    DISCHARGE NOTIFICATION  Member: Melchor Hardwick  : 1965  Ref# [id number]  ID# [id number]  Admission Date: 10/28/22  Discharge Date:  22  Disposition:  To have surgery today 22 at [facility]    Melchor Hardwick III (57 y.o. Male)     Date of Birth   1965    Social Security Number       Address   [address] [address]    Home Phone   [phone]    MRN   [id number]       [facility]    Marital Status   Single                            Admission Date   10/28/22    Admission Type   Elective    Admitting Provider   Ja Bowers MD    Attending Provider       Department, Room/Bed   [facility] REHABILITATION, 109/2S       Discharge Date   2022    Discharge Disposition   [facility] (DC - External)    Discharge Destination                               Attending Provider: (none)   Allergies: No Known Allergies    Isolation: None   Infection: MRSA (22), MRSA No Isolation this Admit (22)   Code Status: Prior    Ht: 177.8 cm (70\")   Wt: 89.6 kg (197 lb 8.5 oz)    Admission Cmt: None   Principal Problem: Debility [R53.81]               Corrina Mendez MSW     Case Management  Significant Note     Signed  Date of Service:  22  Creation Time:  22     Signed                         22   Plan   Plan Spoke to sister Ros [phone] about how pt is doing in therapy, discharge on 22 to [facility] for surgery, and discharge plans.  Sister confirmed she will come to rehab at 5:00 am tomorrow to transport pt to [facility].   Patient/Family in Agreement with Plan yes                 Corrina Mendez MSW     Case Management  Significant Note     Signed  Date of " Service:  229  Creation Time:  22     Signed                         22 0925   Plan   Plan Team conference held today.  Pt is scheduled for surgery at [facility] on 22 arrive at 7:30 am, therefore, will be discharged on this date.  Spoke to pt about plans for discharge to [facility] on 22 for surgery to have an antibiotic spacer.  Pt says his sister Ros is suppose to come to rehab at 5:00 am on 22 to transport pt to [facility].   Pt will be discharged from rehab and admitted to [facility] on 22.   Patient/Family in Agreement with Plan yes                 Joe Mike MD   Physician  Medicine  Discharge Summary     Signed  Date of Service:  22  Creation Time:  22     Signed                                                                                                                                                                                                                                                                                                                                                                                                                                                                                                                       [facility] DISCHARGE SUMMARY     Patient Identification:  Name:  Melchor Hardwick III  Age:  57 y.o.  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     HOSPITAL COURSE  Patient is a 57 y.o. male presented to [facility] acute inpatient rehab in transfer from [facility].  Patient 57-year-old gentleman who had transmetatarsal amputation of his left foot in May of this year.  Patient states afterwards he was favoring his right leg and thought he was overworking the right knee and developed increased pain and swelling and erythema.  Patient was diagnosed with septic arthritis of the right knee and MRSA bacteremia.  Patient did have I&D of the knee and was taken to the OR for a washout.  Patient was placed on daptomycin for several weeks.  Did have PICC line placed and recommend another 6 to 8 weeks of IV antibiotics since admission to our service.  Patient was admitted for treatment of debility and help restore ambulation.     Right septic knee--again patient had been seen initially at [facility] in [place] where he had washout of the knee as well as continued IV antibiotic therapy.  Patient did have MRSA that was thought to be causative agent.  Patient did go to [facility] for continued IV antibiotic therapy and we continued patient on daptomycin since admission to our service.  Couple of weeks ago patient developed some increased swelling in the right knee we did obtain CT scan of the knee and patient had recurrence of effusion as well as findings consistent with osteomyelitis involving the lateral tibial plateau.  I did discuss the case with orthopedics here who recommended that he follow-up with his surgeon in [place].  Talking with Dr. Lopez he agreed to see patient in his office as he was not acutely septic.  And he did after evaluation recommended that he come back this week for antibiotic spacer placement.  Patient's been continued on IV daptomycin during  this time.     MRSA bacteremia/endocarditis--has been continued on daptomycin.  Did have TYRESE during the admission which again showed likely vegetation on the tricuspid leaflet.     Debility--at the time of admission patient was independent for bed mobility.  Patient unable to do transfers or ambulate secondary to orthostatic hypotension at the time of admission.  At the time of admission patient was requiring moderate assistance for bathing; set up for upper body dressing; moderate assistance for lower body dressing; set up for grooming; moderate assist for toileting.  At the time of discharge, requires minimum assist to contact-guard for toileting.  Contact-guard for transfers.  Set up for upper body and lower body dressing.  Ambulated 60 feet with a front wheel walker with standby assistance at the time of discharge.     Orthostatic hypotension patient did require patient being placed on midodrine and fludrocortisone.  Patient has responded well to this.  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     Diabetes mellitus well controlled throughout the admission.        VITAL SIGNS:  Temp:  [98.2 °F (36.8 °C)-98.3 °F (36.8 °C)] 98.3 °F (36.8 °C)  Heart Rate:  [75-89] 75  Resp:  [18] 18  BP: (122-143)/(73-84) 122/84  SpO2:  [96 %-99 %] 96 %  on   ;   Device (Oxygen Therapy): room air     Body mass index is 28.34 kg/m².      Wt Readings from Last 3 Encounters:   11/01/22 89.6 kg (197 lb 8.5 oz)   11/28/22 88.9 kg (196 lb)   09/23/22 98 kg (216 lb)         PHYSICAL EXAM:  Constitutional: No acute distress  HEENT: Normocephalic atraumatic  Neck: Supple  Cardiovascular: Regular rate and rhythm  Pulmonary/Chest: Clear to auscultation  Abdominal: Positive bowel sounds soft.   Musculoskeletal: Right knee--minimal swelling noted at this time; patient has transtarsal amputation of the left  foot  Neurological: Sensory changes stocking glove distribution  Skin: No rash  Peripheral vascular: No edema  Genitourinary::     DISCHARGE DISPOSITION   Stable     DISCHARGE MEDICATIONS:           Discharge Medications            New Medications      Instructions Start Date   acetaminophen 325 MG tablet  Commonly known as: TYLENOL    650 mg, Oral, Every 4 Hours PRN        ascorbic acid 500 MG tablet  Commonly known as: VITAMIN C    500 mg, Oral, Daily    Start Date: November 30, 2022      bisacodyl 10 MG suppository  Commonly known as: DULCOLAX    10 mg, Rectal, Daily PRN        cyclobenzaprine 5 MG tablet  Commonly known as: FLEXERIL    5 mg, Oral, 3 Times Daily PRN        daptomycin solution IVPB  Commonly known as: CUBICIN    500 mg, Intravenous, Every 24 Hours        docusate sodium 100 MG capsule    100 mg, Oral, 2 Times Daily        fludrocortisone 0.1 MG tablet    0.05 mg, Oral, Daily    Start Date: November 30, 2022      HYDROcodone-acetaminophen 5-325 MG per tablet  Commonly known as: NORCO    1 tablet, Oral, Every 8 Hours PRN        levothyroxine 25 MCG tablet  Commonly known as: SYNTHROID, LEVOTHROID    25 mcg, Oral, Every Early Morning    Start Date: November 30, 2022      magic barrier cream    1 application, Topical, As Needed        mupirocin 2 % ointment  Commonly known as: BACTROBAN    1 application, Topical, Every 12 Hours Scheduled        pantoprazole 40 MG EC tablet  Commonly known as: PROTONIX    40 mg, Oral, Every Early Morning    Start Date: November 30, 2022      polyethylene glycol 17 g packet  Commonly known as: MIRALAX    17 g, Oral, Daily    Start Date: November 30, 2022                    Changes to Medications      Instructions Start Date   Insulin Aspart 100 UNIT/ML injection  Commonly known as: novoLOG  What changed: how much to take    0-14 Units, Subcutaneous, 3 Times Daily Before Meals        Insulin Aspart 100 UNIT/ML injection  Commonly known as: novoLOG  What changed: You were  already taking a medication with the same name, and this prescription was added. Make sure you understand how and when to take each.    4 Units, Subcutaneous, 3 Times Daily Before Meals        insulin detemir 100 UNIT/ML injection  Commonly known as: LEVEMIR  What changed:   • how much to take  • when to take this    17 Units, Subcutaneous, Every 12 Hours Scheduled        pregabalin 100 MG capsule  Commonly known as: LYRICA  What changed:   • medication strength  • how much to take  • when to take this    100 mg, Oral, Every 12 Hours Scheduled                      Continue These Medications      Instructions Start Date   aspirin 81 MG EC tablet    81 mg, Oral, Daily        Diclofenac Sodium 1 % gel gel  Commonly known as: VOLTAREN    2 g, Topical, 4 Times Daily PRN        midodrine 2.5 MG tablet  Commonly known as: PROAMATINE    2.5 mg, Oral, 3 Times Daily Before Meals        multivitamin with minerals tablet tablet    1 tablet, Oral, Daily            Stop These Medications    atorvastatin 40 MG tablet  Commonly known as: LIPITOR      ferrous sulfate 325 (65 FE) MG tablet      Metamucil wafer wafer      metFORMIN 500 MG tablet  Commonly known as: GLUCOPHAGE      nabumetone 750 MG tablet  Commonly known as: RELAFEN      tiZANidine 4 MG tablet  Commonly known as: ZANAFLEX                        Your medication list              START taking these medications      Instructions Last Dose Given Next Dose Due   acetaminophen 325 MG tablet  Commonly known as: TYLENOL       Take 2 tablets by mouth Every 4 (Four) Hours As Needed for Mild Pain.          ascorbic acid 500 MG tablet  Commonly known as: VITAMIN C  Start taking on: November 30, 2022       Take 1 tablet by mouth Daily.          bisacodyl 10 MG suppository  Commonly known as: DULCOLAX       Insert 1 suppository into the rectum Daily As Needed for Constipation.          cyclobenzaprine 5 MG tablet  Commonly known as: FLEXERIL       Take 1 tablet by mouth 3 (Three)  Times a Day As Needed for Muscle Spasms.          daptomycin solution IVPB  Commonly known as: CUBICIN       Infuse 50 mL into a venous catheter Daily for 5 doses. Indications: Bacteria in the Blood          docusate sodium 100 MG capsule       Take 1 capsule by mouth 2 (Two) Times a Day.          fludrocortisone 0.1 MG tablet  Start taking on: November 30, 2022       Take 0.5 tablets by mouth Daily.          HYDROcodone-acetaminophen 5-325 MG per tablet  Commonly known as: NORCO       Take 1 tablet by mouth Every 8 (Eight) Hours As Needed for Moderate Pain for up to 4 days.          levothyroxine 25 MCG tablet  Commonly known as: SYNTHROID, LEVOTHROID  Start taking on: November 30, 2022       Take 1 tablet by mouth Every Morning.          magic barrier cream       Apply 1 application topically to the appropriate area as directed As Needed (skin irritation).          mupirocin 2 % ointment  Commonly known as: BACTROBAN       Apply 1 application topically to the appropriate area as directed Every 12 (Twelve) Hours.          pantoprazole 40 MG EC tablet  Commonly known as: PROTONIX  Start taking on: November 30, 2022       Take 1 tablet by mouth Every Morning.          polyethylene glycol 17 g packet  Commonly known as: MIRALAX  Start taking on: November 30, 2022       Take 17 g by mouth Daily.                            CHANGE how you take these medications      Instructions Last Dose Given Next Dose Due   Insulin Aspart 100 UNIT/ML injection  Commonly known as: novoLOG  What changed: how much to take       Inject 0-14 Units under the skin into the appropriate area as directed 3 (Three) Times a Day Before Meals.          Insulin Aspart 100 UNIT/ML injection  Commonly known as: novoLOG  What changed: You were already taking a medication with the same name, and this prescription was added. Make sure you understand how and when to take each.       Inject 4 Units under the skin into the appropriate area as directed 3  (Three) Times a Day Before Meals.          insulin detemir 100 UNIT/ML injection  Commonly known as: LEVEMIR  What changed:   • how much to take  • when to take this       Inject 17 Units under the skin into the appropriate area as directed Every 12 (Twelve) Hours.          pregabalin 100 MG capsule  Commonly known as: LYRICA  What changed:   • medication strength  • how much to take  • when to take this       Take 1 capsule by mouth Every 12 (Twelve) Hours.                            CONTINUE taking these medications      Instructions Last Dose Given Next Dose Due   aspirin 81 MG EC tablet       Take 1 tablet by mouth Daily.          Diclofenac Sodium 1 % gel gel  Commonly known as: VOLTAREN       Apply 2 g topically to the appropriate area as directed 4 (Four) Times a Day As Needed.          midodrine 2.5 MG tablet  Commonly known as: PROAMATINE       Take 2.5 mg by mouth 3 (Three) Times a Day Before Meals.          multivitamin with minerals tablet tablet       Take 1 tablet by mouth Daily.              STOP taking these medications    atorvastatin 40 MG tablet  Commonly known as: LIPITOR         ferrous sulfate 325 (65 FE) MG tablet         Metamucil wafer wafer         metFORMIN 500 MG tablet  Commonly known as: GLUCOPHAGE         nabumetone 750 MG tablet  Commonly known as: RELAFEN         tiZANidine 4 MG tablet  Commonly known as: ZANAFLEX                 Where to Get Your Medications       Information about where to get these medications is not yet available    Ask your nurse or doctor about these medications  • acetaminophen 325 MG tablet  • ascorbic acid 500 MG tablet  • bisacodyl 10 MG suppository  • cyclobenzaprine 5 MG tablet  • daptomycin solution IVPB  • docusate sodium 100 MG capsule  • fludrocortisone 0.1 MG tablet  • HYDROcodone-acetaminophen 5-325 MG per tablet  • Insulin Aspart 100 UNIT/ML injection  • Insulin Aspart 100 UNIT/ML injection  • insulin detemir 100 UNIT/ML injection  • levothyroxine  25 MCG tablet  • magic barrier cream   • mupirocin 2 % ointment  • pantoprazole 40 MG EC tablet  • polyethylene glycol 17 g packet  • pregabalin 100 MG capsule                Diet Instructions      Diet: Consistent Carbohydrate       Discharge Diet: Consistent Carbohydrate       No future appointments.         Follow-up Information           Gross, Missy, APRN. Go on 11/28/2022.    Specialties: Nurse Practitioner, Family Medicine  Why: follow up within 2 weeks of discharge.  Contact information:  [address] [place] [address] [phone]                                 TEST  RESULTS PENDING AT DISCHARGE     CODE STATUS      Code Status and Medical Interventions:   Ordered at: 11/03/22 1015     Level Of Support Discussed With:     Patient     Code Status (Patient has no pulse and is not breathing):     CPR (Attempt to Resuscitate)     Medical Interventions (Patient has pulse or is breathing):     Full Support     Release to patient:     Routine Release         Joe Mike MD  [facility] Hospitalist  11/29/22  10:26 EST     Please note that this discharge summary required less than 30 minutes to complete.

## 2022-11-30 NOTE — PROGRESS NOTES
Patient Assessment Instrument  Quality Indicators - Discharge FY 2023    Section A. Transportation  Issues Due to Lack of Transportation:  No    Section A. Medication List        Section B. Health Literacy      Section C. BIMS      Section C. Signs and Symptoms of Delirium (from CAM)      Section D. Mood      Section D. Social Isolation      Section GG. Self-Care Performance      Section GG. Mobility Performance      Section J. Health Conditions Discharge      Section J. Health Conditions (Pain)      Section K. Swallowing/Nutritional Status  Nutritional Approaches Past 7 Days:  Nutritional Approaches at Discharge:    Section M. Skin Conditions Discharge      . Current Number of Unhealed Pressure Ulcers      Section N. Medication        Section O. Special Treatments, Procedures, and Programs    Signed by: KAREN Calderon

## 2022-11-30 NOTE — PROGRESS NOTES
"Patient Assessment Instrument  Quality Indicators - Discharge FY 2023    Section A. Transportation      Section A. Medication List  Subsequent Provider:  02 - [facility]  Medication List to Subsequent Provider at Discharge:  Yes - Current reconciled  medication list provided to the subsequent provider  Route(s) of Medication List Transmission to Subsequent Provider:  Electronic  Health Record  Medication List to Patient:  Not applicable.  Patient discharged to a subsequent  provider as defined in 44D    Section B. Health Literacy  Frequency of Needing Assistance Reading:  Never    Section C. BIMS  Brief Interview for Mental Status (BIMS) was conducted.  Repetition of Three Words: Three words  Able to report correct year: Correct  Able to report correct month: Accurate within 5 days  Able to report correct day of the week: Correct  Able to recall \"sock\": Yes, no cue required  Able to recall \"blue\": Yes, no cue required  Able to recall \"bed\": Yes, no cue required    BIMS SUMMARY SCORE: 15 Cognitively intact    Section C. Signs and Symptoms of Delirium (from CAM)  Acute Change in Mental Status:   No  Inattention:   Behavior not present  Disorganized Thinking:   Behavior not present  Altered Level of Consciousness:   Behavior not present    Section D. Mood  Presence of little interest or pleasure in doing things:   No  Frequency of having little interest or pleasure in doing things:   Never or 1  day  Presence of feeling down, depressed, or hopeless:   No  Frequency of feeling down, depressed, or hopeless:   Never or 1 day   Interview Ended. Above responses do not meet criteria to continue  Total Severity Score:   00    Section D. Social Isolation  Frequency of Feeling Lonely or Isolated:  Never    Section GG. Self-Care Performance      Section GG. Mobility Performance      Section J. Health Conditions Discharge  Fall(s) Since Admission:  No    Section J. Health Conditions (Pain)  Pain Effect on Sleep:  "  Rarely or not at all  Pain Interference with Therapy Activities:   Rarely or not at all  Pain Interference with Day-to-Day Activities:   Rarely or not at all    Section K. Swallowing/Nutritional Status  Nutritional Approaches Past 7 Days:  Therapeutic diet (e.g., low salt, diabetic,  low cholesterol)  Nutritional Approaches at Discharge:  Therapeutic diet (e.g., low salt,  diabetic, low cholesterol)    Section M. Skin Conditions Discharge  Unhealed Pressure Ulcer(s)/Injurie(s) at Stage 1 or Higher:  No    . Current Number of Unhealed Pressure Ulcers      Section N. Medication    Medication Intervention: Not applicable - There were no potential clinically  significant medication issues identified since admission or patient is not  taking any medications.  Patient is taking medications in the following pharmacological classification:  F. Antibiotic An indication is NOT noted for all medications in the Antibiotic  drug class. H. Opioid An indication is noted for all medications in the Opiod  drug class. J. Hypoglycemic (including insulin) An indication is NOT noted for  all medications in the Hypoglycemic drug class. I. Antiplatelet An indication is  NOT noted for all medications in the Antiplatelet drug class. E. Anticoagulant  An indication is noted for all medications in the Anticoagulant drug class.    Section O. Special Treatments, Procedures, and Programs  IV Medications: Antibiotics   IV Access: Midline    Signed by: Jaja Ramirez, Supervisor

## 2022-12-01 ENCOUNTER — APPOINTMENT (OUTPATIENT)
Dept: CARDIOLOGY | Facility: HOSPITAL | Age: 57
End: 2022-12-01

## 2022-12-01 LAB
ANION GAP SERPL CALCULATED.3IONS-SCNC: 11 MMOL/L (ref 5–15)
ANION GAP SERPL CALCULATED.3IONS-SCNC: 7 MMOL/L (ref 5–15)
BACTERIA SPEC AEROBE CULT: ABNORMAL
BH CV VAS BP RIGHT ARM: NORMAL MMHG
BUN SERPL-MCNC: 16 MG/DL (ref 6–20)
BUN SERPL-MCNC: 18 MG/DL (ref 6–20)
BUN/CREAT SERPL: 22.5 (ref 7–25)
BUN/CREAT SERPL: 25.7 (ref 7–25)
CALCIUM SPEC-SCNC: 7.9 MG/DL (ref 8.6–10.5)
CALCIUM SPEC-SCNC: 8.5 MG/DL (ref 8.6–10.5)
CHLORIDE SERPL-SCNC: 100 MMOL/L (ref 98–107)
CHLORIDE SERPL-SCNC: 98 MMOL/L (ref 98–107)
CO2 SERPL-SCNC: 23 MMOL/L (ref 22–29)
CO2 SERPL-SCNC: 26 MMOL/L (ref 22–29)
COTININE SERPL-MCNC: <1 NG/ML
CREAT SERPL-MCNC: 0.7 MG/DL (ref 0.76–1.27)
CREAT SERPL-MCNC: 0.71 MG/DL (ref 0.76–1.27)
DEPRECATED RDW RBC AUTO: 48.3 FL (ref 37–54)
EGFRCR SERPLBLD CKD-EPI 2021: 107 ML/MIN/1.73
EGFRCR SERPLBLD CKD-EPI 2021: 107.5 ML/MIN/1.73
ERYTHROCYTE [DISTWIDTH] IN BLOOD BY AUTOMATED COUNT: 16.7 % (ref 12.3–15.4)
GLUCOSE BLDC GLUCOMTR-MCNC: 231 MG/DL (ref 70–130)
GLUCOSE BLDC GLUCOMTR-MCNC: 252 MG/DL (ref 70–130)
GLUCOSE BLDC GLUCOMTR-MCNC: 288 MG/DL (ref 70–130)
GLUCOSE SERPL-MCNC: 233 MG/DL (ref 65–99)
GLUCOSE SERPL-MCNC: 275 MG/DL (ref 65–99)
GRAM STN SPEC: ABNORMAL
HCT VFR BLD AUTO: 31.6 % (ref 37.5–51)
HGB BLD-MCNC: 10.2 G/DL (ref 13–17.7)
ISOLATED FROM: ABNORMAL
LV EF 2D ECHO EST: 65 %
MAXIMAL PREDICTED HEART RATE: 163 BPM
MCH RBC QN AUTO: 26 PG (ref 26.6–33)
MCHC RBC AUTO-ENTMCNC: 32.3 G/DL (ref 31.5–35.7)
MCV RBC AUTO: 80.6 FL (ref 79–97)
NICOTINE SERPL-MCNC: <1 NG/ML
PLATELET # BLD AUTO: 139 10*3/MM3 (ref 140–450)
PMV BLD AUTO: 10 FL (ref 6–12)
POTASSIUM SERPL-SCNC: 4.1 MMOL/L (ref 3.5–5.2)
POTASSIUM SERPL-SCNC: 4.6 MMOL/L (ref 3.5–5.2)
RBC # BLD AUTO: 3.92 10*6/MM3 (ref 4.14–5.8)
SODIUM SERPL-SCNC: 132 MMOL/L (ref 136–145)
SODIUM SERPL-SCNC: 133 MMOL/L (ref 136–145)
STRESS TARGET HR: 139 BPM
WBC NRBC COR # BLD: 9.88 10*3/MM3 (ref 3.4–10.8)

## 2022-12-01 PROCEDURE — 93312 ECHO TRANSESOPHAGEAL: CPT

## 2022-12-01 PROCEDURE — 93312 ECHO TRANSESOPHAGEAL: CPT | Performed by: INTERNAL MEDICINE

## 2022-12-01 PROCEDURE — 63710000001 INSULIN DETEMIR PER 5 UNITS: Performed by: NURSE PRACTITIONER

## 2022-12-01 PROCEDURE — 93325 DOPPLER ECHO COLOR FLOW MAPG: CPT

## 2022-12-01 PROCEDURE — 97161 PT EVAL LOW COMPLEX 20 MIN: CPT

## 2022-12-01 PROCEDURE — 25010000002 MIDAZOLAM PER 1MG: Performed by: INTERNAL MEDICINE

## 2022-12-01 PROCEDURE — 25010000002 CEFTAROLINE FOSAMIL PER 10 MG: Performed by: INTERNAL MEDICINE

## 2022-12-01 PROCEDURE — 93320 DOPPLER ECHO COMPLETE: CPT

## 2022-12-01 PROCEDURE — 99152 MOD SED SAME PHYS/QHP 5/>YRS: CPT | Performed by: INTERNAL MEDICINE

## 2022-12-01 PROCEDURE — 63710000001 INSULIN LISPRO (HUMAN) PER 5 UNITS: Performed by: INTERNAL MEDICINE

## 2022-12-01 PROCEDURE — 99153 MOD SED SAME PHYS/QHP EA: CPT

## 2022-12-01 PROCEDURE — 93325 DOPPLER ECHO COLOR FLOW MAPG: CPT | Performed by: INTERNAL MEDICINE

## 2022-12-01 PROCEDURE — 82962 GLUCOSE BLOOD TEST: CPT

## 2022-12-01 PROCEDURE — 80048 BASIC METABOLIC PNL TOTAL CA: CPT | Performed by: INTERNAL MEDICINE

## 2022-12-01 PROCEDURE — 99152 MOD SED SAME PHYS/QHP 5/>YRS: CPT

## 2022-12-01 PROCEDURE — 76376 3D RENDER W/INTRP POSTPROCES: CPT | Performed by: INTERNAL MEDICINE

## 2022-12-01 PROCEDURE — 85027 COMPLETE CBC AUTOMATED: CPT | Performed by: NURSE PRACTITIONER

## 2022-12-01 PROCEDURE — 25010000002 FENTANYL CITRATE (PF) 50 MCG/ML SOLUTION: Performed by: INTERNAL MEDICINE

## 2022-12-01 PROCEDURE — 76376 3D RENDER W/INTRP POSTPROCES: CPT

## 2022-12-01 PROCEDURE — 63710000001 INSULIN LISPRO (HUMAN) PER 5 UNITS: Performed by: NURSE PRACTITIONER

## 2022-12-01 PROCEDURE — 63710000001 INSULIN DETEMIR PER 5 UNITS: Performed by: INTERNAL MEDICINE

## 2022-12-01 PROCEDURE — 93321 DOPPLER ECHO F-UP/LMTD STD: CPT

## 2022-12-01 PROCEDURE — 80048 BASIC METABOLIC PNL TOTAL CA: CPT | Performed by: ORTHOPAEDIC SURGERY

## 2022-12-01 PROCEDURE — 93320 DOPPLER ECHO COMPLETE: CPT | Performed by: INTERNAL MEDICINE

## 2022-12-01 PROCEDURE — 25010000002 CEFAZOLIN IN DEXTROSE 2-4 GM/100ML-% SOLUTION: Performed by: ORTHOPAEDIC SURGERY

## 2022-12-01 PROCEDURE — 25010000002 VANCOMYCIN 10 G RECONSTITUTED SOLUTION: Performed by: INTERNAL MEDICINE

## 2022-12-01 PROCEDURE — 25010000002 VANCOMYCIN 10 G RECONSTITUTED SOLUTION

## 2022-12-01 PROCEDURE — 97116 GAIT TRAINING THERAPY: CPT

## 2022-12-01 RX ORDER — INSULIN LISPRO 100 [IU]/ML
7 INJECTION, SOLUTION INTRAVENOUS; SUBCUTANEOUS
Status: DISCONTINUED | OUTPATIENT
Start: 2022-12-01 | End: 2022-12-15 | Stop reason: HOSPADM

## 2022-12-01 RX ORDER — FENTANYL CITRATE 50 UG/ML
INJECTION, SOLUTION INTRAMUSCULAR; INTRAVENOUS
Status: COMPLETED | OUTPATIENT
Start: 2022-12-01 | End: 2022-12-01

## 2022-12-01 RX ORDER — MIDAZOLAM HYDROCHLORIDE 2 MG/2ML
INJECTION, SOLUTION INTRAMUSCULAR; INTRAVENOUS
Status: COMPLETED | OUTPATIENT
Start: 2022-12-01 | End: 2022-12-01

## 2022-12-01 RX ADMIN — VANCOMYCIN HYDROCHLORIDE 1750 MG: 10 INJECTION, POWDER, LYOPHILIZED, FOR SOLUTION INTRAVENOUS at 11:32

## 2022-12-01 RX ADMIN — MELOXICAM 15 MG: 15 TABLET ORAL at 08:12

## 2022-12-01 RX ADMIN — INSULIN LISPRO 4 UNITS: 100 INJECTION, SOLUTION INTRAVENOUS; SUBCUTANEOUS at 11:33

## 2022-12-01 RX ADMIN — MIDODRINE HYDROCHLORIDE 2.5 MG: 5 TABLET ORAL at 08:12

## 2022-12-01 RX ADMIN — CEFTAROLINE FOSAMIL 600 MG: 600 POWDER, FOR SOLUTION INTRAVENOUS at 23:26

## 2022-12-01 RX ADMIN — OXYCODONE 5 MG: 5 TABLET ORAL at 11:33

## 2022-12-01 RX ADMIN — INSULIN LISPRO 7 UNITS: 100 INJECTION, SOLUTION INTRAVENOUS; SUBCUTANEOUS at 18:00

## 2022-12-01 RX ADMIN — ASPIRIN 81 MG: 81 TABLET, COATED ORAL at 08:12

## 2022-12-01 RX ADMIN — ACETAMINOPHEN 1000 MG: 500 TABLET, FILM COATED ORAL at 05:33

## 2022-12-01 RX ADMIN — INSULIN LISPRO 3 UNITS: 100 INJECTION, SOLUTION INTRAVENOUS; SUBCUTANEOUS at 08:12

## 2022-12-01 RX ADMIN — VANCOMYCIN HYDROCHLORIDE 1250 MG: 10 INJECTION, POWDER, LYOPHILIZED, FOR SOLUTION INTRAVENOUS at 21:50

## 2022-12-01 RX ADMIN — MIDAZOLAM HYDROCHLORIDE 1 MG: 1 INJECTION, SOLUTION INTRAMUSCULAR; INTRAVENOUS at 14:21

## 2022-12-01 RX ADMIN — MIDAZOLAM HYDROCHLORIDE 2 MG: 1 INJECTION, SOLUTION INTRAMUSCULAR; INTRAVENOUS at 14:17

## 2022-12-01 RX ADMIN — POLYETHYLENE GLYCOL 3350 17 G: 17 POWDER, FOR SOLUTION ORAL at 08:10

## 2022-12-01 RX ADMIN — INSULIN LISPRO 4 UNITS: 100 INJECTION, SOLUTION INTRAVENOUS; SUBCUTANEOUS at 08:11

## 2022-12-01 RX ADMIN — ACETAMINOPHEN 1000 MG: 500 TABLET, FILM COATED ORAL at 21:50

## 2022-12-01 RX ADMIN — CEFAZOLIN SODIUM 2 G: 2 INJECTION, SOLUTION INTRAVENOUS at 01:21

## 2022-12-01 RX ADMIN — INSULIN DETEMIR 17 UNITS: 100 INJECTION, SOLUTION SUBCUTANEOUS at 08:11

## 2022-12-01 RX ADMIN — ASPIRIN 81 MG: 81 TABLET, COATED ORAL at 21:50

## 2022-12-01 RX ADMIN — PREGABALIN 150 MG: 150 CAPSULE ORAL at 21:50

## 2022-12-01 RX ADMIN — FENTANYL CITRATE 50 MCG: 50 INJECTION, SOLUTION INTRAMUSCULAR; INTRAVENOUS at 14:17

## 2022-12-01 RX ADMIN — INSULIN LISPRO 3 UNITS: 100 INJECTION, SOLUTION INTRAVENOUS; SUBCUTANEOUS at 17:59

## 2022-12-01 RX ADMIN — OXYCODONE 10 MG: 5 TABLET ORAL at 01:21

## 2022-12-01 RX ADMIN — CEFTAROLINE FOSAMIL 600 MG: 600 POWDER, FOR SOLUTION INTRAVENOUS at 10:15

## 2022-12-01 RX ADMIN — TRAMADOL HYDROCHLORIDE 50 MG: 50 TABLET, COATED ORAL at 08:12

## 2022-12-01 RX ADMIN — SODIUM CHLORIDE, POTASSIUM CHLORIDE, SODIUM LACTATE AND CALCIUM CHLORIDE 100 ML/HR: 600; 310; 30; 20 INJECTION, SOLUTION INTRAVENOUS at 05:40

## 2022-12-01 RX ADMIN — PREGABALIN 150 MG: 150 CAPSULE ORAL at 08:12

## 2022-12-01 RX ADMIN — LEVOTHYROXINE SODIUM 25 MCG: 25 TABLET ORAL at 05:33

## 2022-12-01 RX ADMIN — PANTOPRAZOLE SODIUM 40 MG: 40 TABLET, DELAYED RELEASE ORAL at 05:33

## 2022-12-01 RX ADMIN — FENTANYL CITRATE 25 MCG: 50 INJECTION, SOLUTION INTRAMUSCULAR; INTRAVENOUS at 14:21

## 2022-12-01 RX ADMIN — MIDODRINE HYDROCHLORIDE 2.5 MG: 5 TABLET ORAL at 17:59

## 2022-12-01 RX ADMIN — OXYCODONE 5 MG: 5 TABLET ORAL at 17:59

## 2022-12-01 RX ADMIN — INSULIN DETEMIR 20 UNITS: 100 INJECTION, SOLUTION SUBCUTANEOUS at 21:50

## 2022-12-01 NOTE — THERAPY EVALUATION
Patient Name: Melchor Hardwick III  : 1965    MRN: [id number]                              Today's Date: 2022       Admit Date: 2022    Visit Dx:     ICD-10-CM ICD-9-CM   1. Infection of right knee (HCC)  M00.9 686.9     Patient Active Problem List   Diagnosis   • Hyperlipidemia   • Hypertensive disorder   • Obesity   • Type 2 diabetes mellitus with hyperglycemia, with long-term current use of insulin (HCC)   • Gas gangrene of foot (HCC)   • Cellulitis in diabetic foot (HCC)   • Other acute osteomyelitis of left foot (HCC)   • Osteomyelitis (HCC)   • Critical illness myopathy   • Staphylococcal arthritis of right knee (HCC)   • Other cirrhosis of liver (HCC)   • MRSA bacteremia   • Endocarditis of tricuspid valve   • Wound of right buttock   • History of osteomyelitis   • Debility   • Infection of right knee (HCC)   • S/P right knee implant removal with insertion of antibiotic spacer   • Hypothyroid     Past Medical History:   Diagnosis Date   • Diabetes mellitus (HCC)     4 times per day gets checked for sugar at rehab   • Dizzy    • Hyperlipidemia    • Hypertension    • Hypothyroid 2022   • MRSA infection     blood -      • Orthostatic hypotension    • Pressure sore on buttocks     top crack -  sees wound - but now rn take care of it at rehab - minor opening - dime size - pat nurse didnt assess-  pt states getting better   • Pyogenic arthritis of right knee joint, due to unspecified organism (HCC) 2022   • Sepsis (HCC)    • Wears glasses     readers     Past Surgical History:   Procedure Laterality Date   • ABSCESS DRAINAGE  2004    PERINEUM   • AMPUTATION FOOT Left 2022    Procedure: AMPUTATION FOOT;  Surgeon: Addison Colby MD;  Location: [facility];  Service: Podiatry;  Laterality: Left;   • INCISION AND DRAINAGE LEG Left 05/10/2022    Procedure: INCISION AND DRAINAGE LOWER EXTREMITY;  Surgeon: Addison Colby MD;  Location: [facility] OR;  Service: Podiatry;  Laterality:  Left;   • KNEE INCISION AND DRAINAGE Right 09/21/2022    Procedure: KNEE INCISION AND DRAINAGE RIGHT;  Surgeon: Brain Bentley MD;  Location:  [facility] OR;  Service: Orthopedics;  Laterality: Right;   • PERIPHERALLY INSERTED CENTRAL CATHETER INSERTION Left    • WOUND DEBRIDEMENT Left 05/13/2022    Procedure: DEBRIDEMENT FOOT;  Surgeon: Addison Colby MD;  Location:  [facility] OR;  Service: Podiatry;  Laterality: Left;      General Information     Row Name 12/01/22 0922          Physical Therapy Time and Intention    Document Type evaluation  -     Mode of Treatment physical therapy  -     Row Name 12/01/22 0922          General Information    Patient Profile Reviewed yes  -SS     Prior Level of Function min assist:;all household mobility;gait;transfer;bed mobility  use of FWW; pt. at rehab prior to this hospitilization  -     Existing Precautions/Restrictions fall;brace on at all times;other (see comments);orthostatic hypotension  wound vac, adductor canal nerve cath, hemovac drain, pt. reports history of orthostatic hypotension w/use of binder  -     Barriers to Rehab medically complex;previous functional deficit  -SS     Row Name 12/01/22 0922          Living Environment    People in Home sibling(s)  prior to rehab  -SS     Row Name 12/01/22 0922          Home Main Entrance    Number of Stairs, Main Entrance other (see comments)  ramp  -SS     Row Name 12/01/22 0922          Stairs Within Home, Primary    Number of Stairs, Within Home, Primary none  -SS     Row Name 12/01/22 0922          Cognition    Orientation Status (Cognition) oriented x 4  -SS     Row Name 12/01/22 0922          Safety Issues, Functional Mobility    Safety Issues Affecting Function (Mobility) insight into deficits/self-awareness;judgment;positioning of assistive device;problem-solving;safety precaution awareness;safety precautions follow-through/compliance;sequencing abilities  -     Impairments Affecting Function (Mobility)  balance;endurance/activity tolerance;pain;range of motion (ROM);postural/trunk control;strength  -           User Key  (r) = Recorded By, (t) = Taken By, (c) = Cosigned By    Initials Name Provider Type     Susan Quesada, PT Physical Therapist               Mobility     Row Name 12/01/22 0935          Bed Mobility    Bed Mobility scooting/bridging;supine-sit  -SS     Scooting/Bridging [place] (Bed Mobility) minimum assist (75% patient effort);verbal cues  -SS     Supine-Sit [place] (Bed Mobility) minimum assist (75% patient effort);verbal cues  -     Comment, (Bed Mobility) VC for sequencing; pt. asymptomatic  -     Row Name 12/01/22 0935          Sit-Stand Transfer    Sit-Stand [place] (Transfers) minimum assist (75% patient effort);2 person assist;verbal cues  -     Assistive Device (Sit-Stand Transfers) walker, front-wheeled  -     Comment, (Sit-Stand Transfer) VC for hand placement, stepping out RLE, LLE set up, lowering with eccentric control  -     Row Name 12/01/22 0935          Gait/Stairs (Locomotion)    [place] Level (Gait) minimum assist (75% patient effort);1 person assist;1 person to manage equipment;verbal cues  -     Assistive Device (Gait) walker, front-wheeled  -     Distance in Feet (Gait) 35  -     Deviations/Abnormal Patterns (Gait) right sided deviations;antalgic;stride length decreased;weight shifting decreased;ataxic;yao decreased;gait speed decreased  -     Bilateral Gait Deviations forward flexed posture;heel strike decreased  -     Right Sided Gait Deviations knee buckling, right side  -     Comment, (Gait/Stairs) Pt. ambulated with a step to gait pattern that improved to step through with cueing. VC for upright posture, sequencing of AD/LE advancement, increased heel strike. Pt. reports feeling like RLE is going to buckle, none observed and knee immobilizer securely fastened. Gait limited by fatigue.  -     Row Name 12/01/22 0935           Mobility    Extremity Weight-bearing Status right lower extremity  -     Right Lower Extremity (Weight-bearing Status) weight-bearing as tolerated (WBAT);other (see comments)  in knee immobilizer for 2 weeks  -           User Key  (r) = Recorded By, (t) = Taken By, (c) = Cosigned By    Initials Name Provider Type     Susan Quesada PT Physical Therapist               Obj/Interventions     Row Name 12/01/22 0939          Range of Motion Comprehensive    Comment, General Range of Motion LLE WFL, R hip/knee not tested secondary to knee immobilzer, R ankle WFL  -     Row Name 12/01/22 0939          Strength Comprehensive (MMT)    Comment, General Manual Muscle Testing (MMT) Assessment LLE gross 4+/5, RLE gross 4-/5 per SLR  -     Row Name 12/01/22 0939          Motor Skills    Therapeutic Exercise hip;knee;ankle  -     Row Name 12/01/22 0939          Hip (Therapeutic Exercise)    Hip (Therapeutic Exercise) isometric exercises  -     Hip Isometrics (Therapeutic Exercise) bilateral;gluteal sets;10 repetitions  -     Row Name 12/01/22 0939          Knee (Therapeutic Exercise)    Knee (Therapeutic Exercise) isometric exercises  -     Knee Isometrics (Therapeutic Exercise) bilateral;quad sets;10 repetitions  -     Row Name 12/01/22 0939          Ankle (Therapeutic Exercise)    Ankle (Therapeutic Exercise) AROM (active range of motion)  -     Ankle AROM (Therapeutic Exercise) bilateral;dorsiflexion;plantarflexion;10 repetitions  -     Row Name 12/01/22 0939          Balance    Balance Assessment sitting static balance;sitting dynamic balance;sit to stand dynamic balance;standing static balance;standing dynamic balance  -     Static Sitting Balance standby assist  -     Dynamic Sitting Balance standby assist  -     Position, Sitting Balance unsupported;sitting edge of bed  -     Sit to Stand Dynamic Balance minimal assist;2-person assist  -     Static Standing Balance minimal assist  -      Dynamic Standing Balance minimal assist;1-person assist;1 person to manage equipment  -     Position/Device Used, Standing Balance supported;walker, front-wheeled  -SS     Balance Interventions sitting;standing;sit to stand;supported;static;dynamic  -     Row Name 12/01/22 0939          Sensory Assessment (Somatosensory)    Sensory Assessment (Somatosensory) LE sensation intact  -           User Key  (r) = Recorded By, (t) = Taken By, (c) = Cosigned By    Initials Name Provider Type     Susan Quesada, PT Physical Therapist               Goals/Plan     Row Name 12/01/22 0945          Bed Mobility Goal 1 (PT)    Activity/Assistive Device (Bed Mobility Goal 1, PT) bed mobility activities, all  -SS     [place] Level/Cues Needed (Bed Mobility Goal 1, PT) modified independence  -SS     Time Frame (Bed Mobility Goal 1, PT) long term goal (LTG);10 days  -     Row Name 12/01/22 0945          Transfer Goal 1 (PT)    Activity/Assistive Device (Transfer Goal 1, PT) sit-to-stand/stand-to-sit;bed-to-chair/chair-to-bed  -SS     [place] Level/Cues Needed (Transfer Goal 1, PT) modified independence  -SS     Time Frame (Transfer Goal 1, PT) long term goal (LTG);10 days  -     Row Name 12/01/22 0945          Gait Training Goal 1 (PT)    Activity/Assistive Device (Gait Training Goal 1, PT) gait (walking locomotion);assistive device use;walker, rolling  -SS     [place] Level (Gait Training Goal 1, PT) modified independence  -SS     Distance (Gait Training Goal 1, PT) 150  -SS     Time Frame (Gait Training Goal 1, PT) long term goal (LTG);10 days  -     Row Name 12/01/22 0945          Therapy Assessment/Plan (PT)    Planned Therapy Interventions (PT) balance training;bed mobility training;gait training;home exercise program;neuromuscular re-education;patient/family education;postural re-education;strengthening;stretching;transfer training  -           User Key  (r) = Recorded By, (t) = Taken By, (c) =  Cosigned By    Initials Name Provider Type     Susan Quesada, PT Physical Therapist               Clinical Impression     Row Name 12/01/22 0942          Pain    Pretreatment Pain Rating 0/10 - no pain  -     Posttreatment Pain Rating 3/10  -     Pain Location - Side/Orientation Left  -     Pain Location anterior  -     Pain Location - knee  -     Pain Intervention(s) Cold applied;Repositioned;Ambulation/increased activity;Elevated  -     Additional Documentation Pain Scale: Numbers Pre/Post-Treatment (Group)  -     Row Name 12/01/22 0942          Plan of Care Review    Plan of Care Reviewed With patient  -     Outcome Evaluation Pt. performed bed mobility with min assist. He performed sit to stand transfer w/min assist of 2. He ambulated 35' w/front wheeled walker, min assist of 1 + 1 for equipment management. Gait limited by fatigue. Knee immobilizer donned at all times. No buckling noted but pt. reports feeling like RLE wanting to buckle. L walking boot donned for comfort per history of L transmetarsal ampuation. Recommend inpatient rehab upon discharge.  -     Row Name 12/01/22 0942          Therapy Assessment/Plan (PT)    Rehab Potential (PT) good, to achieve stated therapy goals  -     Criteria for Skilled Interventions Met (PT) yes;meets criteria;skilled treatment is necessary  -     Therapy Frequency (PT) daily  -     Row Name 12/01/22 0942          Vital Signs    Pre Systolic BP Rehab 147  -SS     Pre Treatment Diastolic BP 68  -SS     Pretreatment Heart Rate (beats/min) 95  -SS     Pre SpO2 (%) 99  -SS     O2 Delivery Pre Treatment room air  -     Pre Patient Position Supine  -     Row Name 12/01/22 0942          Positioning and Restraints    Pre-Treatment Position in bed  -     Post Treatment Position chair  -SS     In Chair notified nsg;reclined;call light within reach;encouraged to call for assist;exit alarm on;with nsg;on mechanical lift sling;waffle cushion;legs  elevated;R knee immobilizer  -           User Key  (r) = Recorded By, (t) = Taken By, (c) = Cosigned By    Initials Name Provider Type    Susan Lemon PT Physical Therapist               Outcome Measures     Row Name 12/01/22 0946          How much help from another person do you currently need...    Turning from your back to your side while in flat bed without using bedrails? 3  -SS     Moving from lying on back to sitting on the side of a flat bed without bedrails? 3  -SS     Moving to and from a bed to a chair (including a wheelchair)? 3  -SS     Standing up from a chair using your arms (e.g., wheelchair, bedside chair)? 3  -SS     Climbing 3-5 steps with a railing? 2  -SS     To walk in hospital room? 3  -SS     AM-PAC 6 Clicks Score (PT) 17  -SS     Highest level of mobility 5 --> Static standing  -     Row Name 12/01/22 0946          Functional Assessment    Outcome Measure Options AM-PAC 6 Clicks Basic Mobility (PT)  -           User Key  (r) = Recorded By, (t) = Taken By, (c) = Cosigned By    Initials Name Provider Type    Susan Lemon PT Physical Therapist                             Physical Therapy Education     Title: PT OT SLP Therapies (In Progress)     Topic: Physical Therapy (Done)     Point: Mobility training (Done)     Learning Progress Summary           Patient YANELY Junior VU, DU,NR by  at 12/1/2022 0946    Comment: Educated pt. safety/technique with bed mobility, transfers, ambulation, WB status, use of knee immobilizer, PT POC                   Point: Home exercise program (Done)     Learning Progress Summary           Patient YANELY Junior VU, DU,MINESH by  at 12/1/2022 0946    Comment: Educated pt. safety/technique with bed mobility, transfers, ambulation, WB status, use of knee immobilizer, PT POC                   Point: Body mechanics (Done)     Learning Progress Summary           Patient YANELY Junior VU, DU,NR by  at 12/1/2022 0946    Comment: Educated pt. safety/technique with bed  mobility, transfers, ambulation, WB status, use of knee immobilizer, PT POC                   Point: Precautions (Done)     Learning Progress Summary           Patient Sandi, E, FLORECITA,DU,NR by  at 12/1/2022 0946    Comment: Educated pt. safety/technique with bed mobility, transfers, ambulation, WB status, use of knee immobilizer, PT POC                               User Key     Initials Effective Dates Name Provider Type Discipline     06/01/21 -  Susan Quesada, PT Physical Therapist PT              PT Recommendation and Plan  Planned Therapy Interventions (PT): balance training, bed mobility training, gait training, home exercise program, neuromuscular re-education, patient/family education, postural re-education, strengthening, stretching, transfer training  Plan of Care Reviewed With: patient  Outcome Evaluation: Pt. performed bed mobility with min assist. He performed sit to stand transfer w/min assist of 2. He ambulated 35' w/front wheeled walker, min assist of 1 + 1 for equipment management. Gait limited by fatigue. Knee immobilizer donned at all times. No buckling noted but pt. reports feeling like RLE wanting to buckle. L walking boot donned for comfort per history of L transmetarsal ampuation. Recommend inpatient rehab upon discharge.     Time Calculation:    PT Charges     Row Name 12/01/22 0947             Time Calculation    Start Time 0842  -SS      Stop Time 0910  -SS      Time Calculation (min) 28 min  -SS      PT Received On 12/01/22  -      PT Goal Re-Cert Due Date 12/11/22  -         Time Calculation- PT    Total Timed Code Minutes- PT 28 minute(s)  -SS         Timed Charges    48162 - Gait Training Minutes  8  -SS         Untimed Charges    PT Eval/Re-eval Minutes 50  -SS         Total Minutes    Timed Charges Total Minutes 8  -SS      Untimed Charges Total Minutes 50  -SS       Total Minutes 58  -SS            User Key  (r) = Recorded By, (t) = Taken By, (c) = Cosigned By    Initials Name  Provider Type    SS Susan Quesada, PT Physical Therapist              Therapy Charges for Today     Code Description Service Date Service Provider Modifiers Qty    39025867968 HC GAIT TRAINING EA 15 MIN 12/1/2022 Susan Quesada, PT GP 1    86430226158 HC PT EVAL LOW COMPLEXITY 4 12/1/2022 Susan Quesada, PT GP 1    42628141517 HC PT THER SUPP EA 15 MIN 12/1/2022 Susan Quesada, PT GP 2          PT G-Codes  Outcome Measure Options: AM-PAC 6 Clicks Basic Mobility (PT)  AM-PAC 6 Clicks Score (PT): 17  PT Discharge Summary  Anticipated Discharge Disposition (PT): inpatient rehabilitation facility    Susan Quesada PT  12/1/2022

## 2022-12-01 NOTE — PROGRESS NOTES
IM progress note      Melchor Hardwick III  [id number]  1965     LOS: 1 day     Attending: Brain Bentley MD    Primary Care Provider: Missy Up APRN      Chief Complaint/Reason for visit:  No chief complaint on file.      Subjective   Doing well overall.  Expected postop pain, no complains of nausea, vomiting, or shortness of breath.  Getting ready to work with PT when I saw him.    Objective        Visit Vitals  /68 (BP Location: Right arm, Patient Position: Lying)   Pulse 94   Temp 97.9 °F (36.6 °C) (Oral)   Resp 16   SpO2 100%     Temp (24hrs), Av.7 °F (36.5 °C), Min:97.1 °F (36.2 °C), Max:98.2 °F (36.8 °C)      Intake/Output:    Intake/Output Summary (Last 24 hours) at 2022 0934  Last data filed at 2022 0900  Gross per 24 hour   Intake 2177 ml   Output 1730 ml   Net 447 ml        Physical Therapy:  Outcome Evaluation: Pt. performed bed mobility with min assist. He performed sit to stand transfer w/min assist of 2. He ambulated 35' w/front wheeled walker, min assist of 1 + 1 for equipment management. Gait limited by fatigue. Knee immobilizer donned at all times. No buckling noted but pt. reports feeling like RLE wanting to buckle. L walking boot donned for comfort per history of L transmetarsal ampuation. Recommend inpatient rehab upon discharge.  Physical Exam:     General Appearance:    Alert, cooperative, in no acute distress   Head:    Normocephalic, without obvious abnormality, atraumatic    Lungs:     Normal effort, symmetric chest rise,  clear to      auscultation bilaterally              Heart:    Regular rhythm and normal rate, normal S1 and S2    Abdomen:     Normal bowel sounds, no masses, no organomegaly, soft        non-tender, non-distended, no guarding, no rebound                tenderness   Extremities:  Ace wrapped, knee immobilizer on right knee.  Drain, Prevena suction dressing.  Left leg with long boot on, prior partial foot amputation history.      Pulses:    Pulses palpable and equal bilaterally   Skin:   No bleeding, bruising or rash          Results Review:     I reviewed the patient's new clinical results.   Results from last 7 days   Lab Units 12/01/22  0824 11/28/22  1256 11/26/22  0139   WBC 10*3/mm3 9.88 5.90 4.20   HEMOGLOBIN g/dL 10.2* 11.1* 8.7*   HEMATOCRIT % 31.6* 36.0* 27.3*   PLATELETS 10*3/mm3 139* 144 111*     Results from last 7 days   Lab Units 11/28/22  1256 11/26/22  0139   SODIUM mmol/L 133* 133*   POTASSIUM mmol/L 3.9 3.9   CHLORIDE mmol/L 97* 100   CO2 mmol/L 23.0 26.2   BUN mg/dL 11 11   CREATININE mg/dL 0.63* 0.69*   CALCIUM mg/dL 8.5* 8.0*   BILIRUBIN mg/dL 0.4 0.3   ALK PHOS U/L 294* 235*   ALT (SGPT) U/L 27 23   AST (SGOT) U/L 46* 30   GLUCOSE mg/dL 286* 227*      Latest Reference Range & Units 12/01/22 07:19 12/01/22 08:24 12/01/22 11:20   Glucose 70 - 130 mg/dL 231 (H) 275 (H) 288 (H)   (H): Data is abnormally high  I reviewed the patient's new imaging including images and reports.    All medications reviewed.   acetaminophen, 1,000 mg, Oral, Q8H  aspirin, 81 mg, Oral, Q12H  ceftaroline, 600 mg, Intravenous, Q12H  insulin detemir, 17 Units, Subcutaneous, Q12H  insulin lispro, 0-7 Units, Subcutaneous, TID AC  Insulin Lispro, 4 Units, Subcutaneous, TID With Meals  levothyroxine, 25 mcg, Oral, Q AM  meloxicam, 15 mg, Oral, Daily  midodrine, 2.5 mg, Oral, TID AC  pantoprazole, 40 mg, Oral, Q AM  polyethylene glycol, 17 g, Oral, Daily  pregabalin, 150 mg, Oral, Q12H  vancomycin, 20 mg/kg, Intravenous, Once      cyclobenzaprine, 5 mg, TID PRN  dextrose, 25 g, Q15 Min PRN  dextrose, 15 g, Q15 Min PRN  diphenhydrAMINE, 25 mg, Q6H PRN   Or  diphenhydrAMINE, 25 mg, Q6H PRN  glucagon (human recombinant), 1 mg, Q15 Min PRN  HYDROmorphone, 0.5 mg, Q2H PRN   And  naloxone, 0.1 mg, Q5 Min PRN  ketorolac, 15 mg, Q6H PRN  labetalol, 10 mg, Q4H PRN  ondansetron, 4 mg, Q6H PRN   Or  ondansetron, 4 mg, Q6H PRN  oxyCODONE, 10 mg, Q4H PRN  oxyCODONE, 5 mg, Q4H  PRN  Pharmacy to dose vancomycin, , Continuous PRN  sodium chloride, 500 mL, TID PRN  traMADol, 50 mg, Q8H PRN        Assessment & Plan       S/P right knee implant removal with insertion of antibiotic spacer    Type 2 diabetes mellitus with hyperglycemia, with long-term current use of insulin (HCC)    Infection of right knee (HCC)    Hypothyroid  MRSA sepsis  Chronic low back pain     Plan  1. PT/OT, weightbearing per protocol.  2. Pain control-prns   3. IS-encouraged  4. DVT proph-mechanicals, aspirin.  5. Bowel regimen  6. Monitor post-op labs  7. DC planning       Hypothyroid  -Continue home Synthroid     DM  - hgb A1c on 11/28/2022 7.3  -Continue home bolus insulin with meals and long-acting insulin twice daily/ doses increased 12/1.   - Accu-Chek AC and HS with low dose SSI    TYRESE, rule out endocarditis.    Consider further work-up for recurrent bacteremia, possibly back imaging, left foot imaging, tagged WBC scan?    Antibiotics per ID, Dr. Euceda is following, I discussed with him.  Watch for adverse drug reactions.  Noted antibiotic regimen changed from daptomycin      Rose Diaz MD  12/01/22  09:34 EST

## 2022-12-01 NOTE — PROGRESS NOTES
Orthopedic Progress Note      Patient: Melchor Hardwick III  YOB: 1965     Date of Admission: 11/30/2022  7:25 AM Medical Record Number: [id number]     Attending Physician: Brain Bentley MD    Status Post:  Procedure(s):  ANTIBIOTIC SPACER PLACEMENT KNEE - RIGHT Post Operative Day Number: 1    Subjective : No new orthopaedic complaints     Pain Relief: some relief with present medication.     Systemic Complaints: No Complaints  Vitals:    11/30/22 1916 12/01/22 0012 12/01/22 0533 12/01/22 0716   BP: 106/64 130/64 136/88 147/68   BP Location: Left arm Left arm Left arm Right arm   Patient Position: Lying Lying Lying Lying   Pulse:  92 85 94   Resp: 16 16 16 16   Temp:  98.1 °F (36.7 °C) 97.9 °F (36.6 °C) 97.9 °F (36.6 °C)   TempSrc: Oral Oral Oral Oral   SpO2: 99% 96% 99% 100%       Physical Exam: 57 y.o. male    General Appearance:       Alert, cooperative, in no acute distress                  Extremities:    Dressing Clean, Dry and Intact             No clinical sign of DVT        Able to do good movements of digits    Pulses:   Pulses palpable and equal bilaterally           Diagnostic Tests:     Results from last 7 days   Lab Units 12/01/22  0824 11/28/22  1256 11/26/22  0139   WBC 10*3/mm3 9.88 5.90 4.20   HEMOGLOBIN g/dL 10.2* 11.1* 8.7*   HEMATOCRIT % 31.6* 36.0* 27.3*   PLATELETS 10*3/mm3 139* 144 111*     Results from last 7 days   Lab Units 12/01/22  0824 11/28/22  1256 11/26/22  0139   SODIUM mmol/L 132* 133* 133*   POTASSIUM mmol/L 4.6 3.9 3.9   CHLORIDE mmol/L 98 97* 100   CO2 mmol/L 23.0 23.0 26.2   BUN mg/dL 16 11 11   CREATININE mg/dL 0.71* 0.63* 0.69*   GLUCOSE mg/dL 275* 286* 227*   CALCIUM mg/dL 8.5* 8.5* 8.0*     Results from last 7 days   Lab Units 11/28/22  1256   INR  1.12   APTT seconds 34.6     No results found for: URICACID  Lab Results   Component Value Date    CRYSTAL No crystals seen 09/21/2022     Microbiology Results (last 10 days)     Procedure Component Value -  Date/Time    Tissue / Bone Culture - Tissue, Knee, Right [[id number]] Collected: 11/30/22 1151    Lab Status: Preliminary result Specimen: Tissue from Knee, Right Updated: 11/30/22 1429     Gram Stain Few (2+) WBCs seen      No organisms seen    Tissue / Bone Culture - Tissue, Knee, Right [[id number]] Collected: 11/30/22 1149    Lab Status: Preliminary result Specimen: Tissue from Knee, Right Updated: 11/30/22 1428     Gram Stain Few (2+) WBCs seen      No organisms seen    Tissue / Bone Culture - Synovium, Knee, Right [[id number]] Collected: 11/30/22 1140    Lab Status: Preliminary result Specimen: Synovium from Knee, Right Updated: 11/30/22 1429     Gram Stain Few (2+) WBCs seen      No organisms seen    MRSA Screen, PCR (Inpatient) - Swab, Nares [[id number]]  (Normal) Collected: 11/28/22 1256    Lab Status: Final result Specimen: Swab from Nares Updated: 11/28/22 1516     MRSA PCR Negative    Narrative:      The negative predictive value of this diagnostic test is high and should only be used to consider de-escalating anti-MRSA therapy. A positive result may indicate colonization with MRSA and must be correlated clinically.  MRSA Negative    Blood Culture - Blood, Arm, Right [[id number]]  (Normal) Collected: 11/23/22 1054    Lab Status: Final result Specimen: Blood from Arm, Right Updated: 11/28/22 1115     Blood Culture No growth at 5 days    Blood Culture - Blood, Hand, Right [[id number]]  (Normal) Collected: 11/23/22 1054    Lab Status: Final result Specimen: Blood from Hand, Right Updated: 11/28/22 1115     Blood Culture No growth at 5 days    Blood Culture - Blood, Arm, Right [[id number]]  (Abnormal) Collected: 11/22/22 0700    Lab Status: Final result Specimen: Blood from Arm, Right Updated: 11/26/22 0822     Blood Culture Staphylococcus aureus, MRSA     Comment:   Infectious disease consultation is highly recommended to rule out distant foci of infection.  Methicillin resistant Staphylococcus aureus, Patient may  be an isolation risk.        Isolated from Pediatric Bottle     Gram Stain Pediatric Bottle Gram positive cocci in clusters    Narrative:      Refer to previous blood culture collected on 11/21 for peña's    Blood Culture ID, PCR - Blood, Arm, Right [[id number]]  (Abnormal) Collected: 11/22/22 0700    Lab Status: Final result Specimen: Blood from Arm, Right Updated: 11/23/22 0322     BCID, PCR Staph aureus. mecA/C and MREJ (methicillin resistance gene) detected. Identification by BCID2 PCR.     BOTTLE TYPE Pediatric Bottle    Narrative:      Infectious disease consultation is highly recommended to rule out distant foci of infection.        XR Knee 1 or 2 View Right    Result Date: 11/30/2022  Postsurgical findings of interval right total knee arthroplasty without evidence of immediate hardware complication. Expected soft tissue edema and subcutaneous emphysema.  This report was finalized on 11/30/2022 3:42 PM by Garry Gonzalez.          Current Medications:  Scheduled Meds:acetaminophen, 1,000 mg, Oral, Q8H  aspirin, 81 mg, Oral, Q12H  ceftaroline, 600 mg, Intravenous, Q12H  insulin detemir, 17 Units, Subcutaneous, Q12H  insulin lispro, 0-7 Units, Subcutaneous, TID AC  Insulin Lispro, 4 Units, Subcutaneous, TID With Meals  levothyroxine, 25 mcg, Oral, Q AM  meloxicam, 15 mg, Oral, Daily  midodrine, 2.5 mg, Oral, TID AC  pantoprazole, 40 mg, Oral, Q AM  polyethylene glycol, 17 g, Oral, Daily  pregabalin, 150 mg, Oral, Q12H  vancomycin, 20 mg/kg, Intravenous, Once      Continuous Infusions:lactated ringers, 9 mL/hr, Last Rate: Stopped (11/30/22 1306)  lactated ringers, 100 mL/hr, Last Rate: 100 mL/hr (12/01/22 0540)  Pharmacy to dose vancomycin,   ropivacaine,       PRN Meds:.•  cyclobenzaprine  •  dextrose  •  dextrose  •  diphenhydrAMINE **OR** diphenhydrAMINE  •  glucagon (human recombinant)  •  HYDROmorphone **AND** naloxone  •  ketorolac  •  labetalol  •  ondansetron **OR** ondansetron  •  oxyCODONE  •   oxyCODONE  •  Pharmacy to dose vancomycin  •  sodium chloride  •  traMADol    Assessment: Status post  ANTIBIOTIC SPACER PLACEMENT KNEE - RIGHT    Patient Active Problem List   Diagnosis   • Hyperlipidemia   • Hypertensive disorder   • Obesity   • Type 2 diabetes mellitus with hyperglycemia, with long-term current use of insulin (HCC)   • Gas gangrene of foot (HCC)   • Cellulitis in diabetic foot (HCC)   • Other acute osteomyelitis of left foot (HCC)   • Osteomyelitis (HCC)   • Critical illness myopathy   • Staphylococcal arthritis of right knee (HCC)   • Other cirrhosis of liver (HCC)   • MRSA bacteremia   • Endocarditis of tricuspid valve   • Wound of right buttock   • History of osteomyelitis   • Debility   • Infection of right knee (HCC)   • S/P right knee implant removal with insertion of antibiotic spacer   • Hypothyroid       PLAN:   Continues current post-op course  Anticoagulation: Aspirin started  Mobilize with PT as tolerated per protocol  abx per ID  W/u for endo vascular source of persistent bacteriemia (? Spine source)  Drain for 48 hours   Incisional wound vac x 1 week     Weight Bearing: WBAT  Discharge Plan: OK to plan for discharge pending ID work up     Brain Bentley MD    Date: 12/1/2022    Time: 10:48 EST

## 2022-12-01 NOTE — PROGRESS NOTES
Pharmacy Consult-Vancomycin Dosing  Melchor Hardwick III is a  57 y.o. male receiving vancomycin therapy.     Indication: Bacteremia, bone/joint infection, endocarditis  Consulting Provider: Dr Euceda  ID Consult: Yes    Goal AUC: 400 - 600 mg/L*hr    Current Antimicrobial Therapy  Anti-Infectives (From admission, onward)      Ordered     Dose/Rate Route Frequency Start Stop    12/01/22 1550  vancomycin 1250 mg/250 mL 0.9% NS IVPB ([facility])        Ordering Provider: Kushal Balderrama RPH    1,250 mg  over 90 Minutes Intravenous Every 12 Hours 12/01/22 2100 12/31/22 2059    12/01/22 0933  vancomycin 1750 mg/500 mL 0.9% NS IVPB ([facility])        Ordering Provider: Bryce Euceda MD    20 mg/kg × 88.9 kg  over 120 Minutes Intravenous Once 12/01/22 1030 12/01/22 1310    12/01/22 0826  ceftaroline (TEFLARO) 600 mg/100 mL 0.9% NS ([facility])        Ordering Provider: Bryce Euceda MD    600 mg Intravenous Every 12 Hours 12/01/22 1000 12/15/22 0959    12/01/22 0919  Pharmacy to dose vancomycin        Ordering Provider: Bryce Euceda MD     Does not apply Continuous PRN 12/01/22 0919 12/29/22 0918    11/30/22 1449  ceFAZolin in dextrose (ANCEF) IVPB solution 2 g        Ordering Provider: Brain Bentley MD    2 g  over 30 Minutes Intravenous Every 8 Hours 11/30/22 1800 12/01/22 0151    11/30/22 0815  ceFAZolin in dextrose (ANCEF) IVPB solution 2 g        Ordering Provider: Brain Bentley MD    2 g  over 30 Minutes Intravenous Once 11/30/22 0817 11/30/22 1045    11/30/22 0815  vancomycin 1250 mg/250 mL 0.9% NS IVPB ([facility])        Ordering Provider: Brain Bentley MD    15 mg/kg × 88.9 kg Intravenous Once 11/30/22 0817 11/30/22 0902            Allergies  Allergies as of 11/23/2022    (No Known Allergies)       Labs    Results from last 7 days   Lab Units 12/01/22  0824 11/28/22  1256 11/26/22  0139   BUN mg/dL 16 11 11   CREATININE mg/dL 0.71* 0.63* 0.69*       Results from last 7 days   Lab Units 12/01/22  0824  "11/28/22  1256 11/26/22  0139   WBC 10*3/mm3 9.88 5.90 4.20       Evaluation of Dosing     Last Dose Received in the ED/Outside Facility: 1250mg received as ppx on 11/30 @0902  Is Patient on Dialysis or Renal Replacement: no    Ht - 177.8 cm (70\")  Wt - 88.9 kg (196 lb)    Estimated Creatinine Clearance: 128.9 mL/min (A) (by C-G formula based on SCr of 0.71 mg/dL (L)).    Intake & Output (last 3 days)         11/28 0701 11/29 0700 11/29 0701 11/30 0700 11/30 0701 12/01 0700 12/01 0701 12/02 0700    P.O.   320 457    I.V. (mL/kg)   1400     Total Intake(mL/kg)   1720 457 (5.1)    Urine (mL/kg/hr)   1700 200 (0.3)    Drains   30 25    Total Output   1730 225    Net   -10 +232                    Microbiology and Radiology  Microbiology Results (last 10 days)       Procedure Component Value - Date/Time    Tissue / Bone Culture - Tissue, Knee, Right [[id number]] Collected: 11/30/22 1151    Lab Status: Preliminary result Specimen: Tissue from Knee, Right Updated: 12/01/22 1317     Tissue Culture No growth     Gram Stain Few (2+) WBCs seen      No organisms seen    Tissue / Bone Culture - Tissue, Knee, Right [[id number]] Collected: 11/30/22 1149    Lab Status: Preliminary result Specimen: Tissue from Knee, Right Updated: 12/01/22 1317     Tissue Culture No growth     Gram Stain Few (2+) WBCs seen      No organisms seen    AFB Culture - Tissue, Knee, Right [[id number]] Collected: 11/30/22 1149    Lab Status: Preliminary result Specimen: Tissue from Knee, Right Updated: 12/01/22 1222     AFB Stain No acid fast bacilli seen on concentrated smear    Tissue / Bone Culture - Synovium, Knee, Right [[id number]] Collected: 11/30/22 1140    Lab Status: Preliminary result Specimen: Synovium from Knee, Right Updated: 12/01/22 1317     Tissue Culture No growth     Gram Stain Few (2+) WBCs seen      No organisms seen    AFB Culture - Synovium, Knee, Right [[id number]] Collected: 11/30/22 1140    Lab Status: Preliminary result Specimen: " Synovium from Knee, Right Updated: 12/01/22 1221     AFB Stain No acid fast bacilli seen on concentrated smear    MRSA Screen, PCR (Inpatient) - Swab, Nares [[id number]]  (Normal) Collected: 11/28/22 1256    Lab Status: Final result Specimen: Swab from Nares Updated: 11/28/22 1516     MRSA PCR Negative    Narrative:      The negative predictive value of this diagnostic test is high and should only be used to consider de-escalating anti-MRSA therapy. A positive result may indicate colonization with MRSA and must be correlated clinically.  MRSA Negative    Blood Culture - Blood, Arm, Right [[id number]]  (Normal) Collected: 11/23/22 1054    Lab Status: Final result Specimen: Blood from Arm, Right Updated: 11/28/22 1115     Blood Culture No growth at 5 days    Blood Culture - Blood, Hand, Right [[id number]]  (Normal) Collected: 11/23/22 1054    Lab Status: Final result Specimen: Blood from Hand, Right Updated: 11/28/22 1115     Blood Culture No growth at 5 days    Blood Culture - Blood, Arm, Left [[id number]]  (Abnormal) Collected: 11/22/22 0700    Lab Status: Preliminary result Specimen: Blood from Arm, Left Updated: 12/01/22 1050     Blood Culture Staphylococcus aureus, MRSA     Comment:   Infectious disease consultation is highly recommended to rule out distant foci of infection.  Methicillin resistant Staphylococcus aureus, Patient may be an isolation risk.        Isolated from Aerobic and Anaerobic Bottles     Gram Stain Aerobic Bottle Gram positive cocci in clusters      Anaerobic Bottle Gram positive cocci in clusters    Narrative:      Dr Torres requesting workup of this set to check Dapto, also wants ceftaroline, linezolid and tigacycline 12/1    Blood Culture - Blood, Arm, Right [[id number]]  (Abnormal) Collected: 11/22/22 0700    Lab Status: Final result Specimen: Blood from Arm, Right Updated: 11/26/22 0822     Blood Culture Staphylococcus aureus, MRSA     Comment:   Infectious disease consultation is highly  recommended to rule out distant foci of infection.  Methicillin resistant Staphylococcus aureus, Patient may be an isolation risk.        Isolated from Pediatric Bottle     Gram Stain Pediatric Bottle Gram positive cocci in clusters    Narrative:      Refer to previous blood culture collected on 11/21 for peña's    Blood Culture ID, PCR - Blood, Arm, Right [[id number]]  (Abnormal) Collected: 11/22/22 0700    Lab Status: Final result Specimen: Blood from Arm, Right Updated: 11/23/22 0322     BCID, PCR Staph aureus. mecA/C and MREJ (methicillin resistance gene) detected. Identification by BCID2 PCR.     BOTTLE TYPE Pediatric Bottle    Narrative:      Infectious disease consultation is highly recommended to rule out distant foci of infection.            Reported Vancomycin Levels                         [facility] AUC Calculation:    Current AUC:   -- mg/L*hr    Predicted Steady State AUC on Current Dose: -- mg/L*hr  _________________________________    Predicted Steady State AUC on New Dose:   -- mg/L*hr    Assessment/Plan:    Pharmacy to dose vancomycin for bacteremia. Goal AUC: 400-600 mg/L*hr.  Patient received loading dose of 1750mg on 12/1 at 1132.  Will initiate maintenance dose of 1250mg every 12 hours. Predicted Steady State AUC at current dose: 478 mg/L*hr.  Assess clearance by obtaining vancomycin random level on 11/3 at 0600, prior to dose #5.  Patient is afebrile and voiding today, SCr is 0.61 and WBC is WNL.  Pharmacy will continue to monitor cultures, sensitivities, renal function, and clinical status, and will adjust regimen as necessary.      Thank you for this consult,  Kushal Balderrama, PharmD  Pharmacy Resident  12/1/2022  15:51 EST

## 2022-12-01 NOTE — PLAN OF CARE
Goal Outcome Evaluation:  Plan of Care Reviewed With: patient        Progress: no change     Pt is alert and oriented X 4. VSS. RA. Pain controlled with prn pain meds. Infublock in place. 30 ml bloody drainage from hemovac. Voiding spontaneously using urinal.

## 2022-12-01 NOTE — PLAN OF CARE
Goal Outcome Evaluation:  Plan of Care Reviewed With: patient           Outcome Evaluation: Pt. performed bed mobility with min assist. He performed sit to stand transfer w/min assist of 2. He ambulated 35' w/front wheeled walker, min assist of 1 + 1 for equipment management. Gait limited by fatigue. Knee immobilizer donned at all times. No buckling noted but pt. reports feeling like RLE wanting to buckle. L walking boot donned for comfort per history of L transmetarsal ampuation. Recommend inpatient rehab upon discharge.

## 2022-12-01 NOTE — CASE MANAGEMENT/SOCIAL WORK
Discharge Planning Assessment  [facility]     Patient Name: Melchor Hardwick III  MRN: [id number]  Today's Date: 12/1/2022    Admit Date: 11/30/2022    Plan: Unknown   Discharge Needs Assessment     Row Name 12/01/22 1146       Living Environment    People in Home alone    Current Living Arrangements home    Primary Care Provided by self    Provides Primary Care For no one, unable/limited ability to care for self    Family Caregiver if Needed sibling(s)    Able to Return to Prior Arrangements no    Living Arrangement Comments Has been living with sister and brother in law prior to long hospital stays.       Transition Planning    Patient/Family Anticipates Transition to inpatient rehabilitation facility    Transportation Anticipated family or friend will provide       Discharge Needs Assessment    Readmission Within the Last 30 Days no previous admission in last 30 days    Equipment Currently Used at Home walker, rolling;wheelchair;hospital bed;commode;shower chair;bp cuff;glucometer    Concerns to be Addressed adjustment to diagnosis/illness;basic needs;discharge planning    Discharge Facility/Level of Care Needs acute rehab;nursing facility, intermediate    Current Discharge Risk physical impairment;chronically ill;dependent with mobility/activities of daily living               Discharge Plan     Row Name 12/01/22 1148       Plan    Plan Unknown    Patient/Family in Agreement with Plan unable to assess    Plan Comments I met with Mr. Hardwick at the bedside. He lives alone in [place] but has been staying with his sister and brother in law in [place] [facility]. Mr. Hardwick tells me that he has been independent with mobility and activities of daily living. He is driven by his sister when leaving the home and is not current with any home or outpatient services but has used [facility] in the past. He has a rolling walker, wheelchair, and bedside commode at home and denies any difficulty in obtaining or  affording his medications. Mr. Hardwick anticipates going back to rehab at the time of discharge and would like a referral sent to [facility]. I called to speak with the [facility] admissions liaison and they were not agreeable to take Mr. Hardwick back at this time due to him not being rehab appropriate with a spacer in his knee. They did say that they would take him back after his knee replacement. Apparently the  at [facility] had spoken with Mr. Hardwick about skilled nursing and he was agreeable to this and could pay out of pocket with his savings. He may be LTACH appropriate at some time. Unknow discharge plan at this time. Case management will continue to follow.    Final Discharge Disposition Code 30 - still a patient              Expected Discharge Date and Time     Expected Discharge Date Expected Discharge Time    Dec 2, 2022          Demographic Summary     Row Name 12/01/22 1145       General Information    General Information Comments Confirmed PCP to be Missy Up and [facility] to be insurer.               Functional Status     Row Name 12/01/22 1146       Functional Status, IADL    Medications independent    Meal Preparation independent    Housekeeping independent    Laundry independent    Shopping independent       Employment/    Employment Status disabled               Psychosocial    No documentation.                Abuse/Neglect    No documentation.                Legal    No documentation.                Substance Abuse    No documentation.                Patient Forms    No documentation.                   Gavin Collazo RN

## 2022-12-01 NOTE — CONSULTS
Melchor Hardwick III  1965  [id number]    Date of Consult: 12/1/2022    Admit Date:  11/30/2022      Requesting Provider: Brain Bentley MD  Evaluating Physician: Bryce Euceda MD    Chief Complaint:  Right knee pain    Reason for Consultation: MRSA sepsis, septic knee    History of present illness:     Patient is a 57 y.o.  Yr old male with history of cirrhosis/diabetes and other comorbidity as outlined below.  History of left foot gas gangrene with osteomyelitis and MRSA bacteremia treated in [place] by Dr. Giordano in May/June 2022.  Family reports left transmetatarsal amputation in approximately 8 weeks IV vancomycin.  Notes from [place] indicate transthoracic echocardiogram no vegetation per Dr. Giordano; he thinks he might of had several weeks of oral antibiotic after that but not entirely clear.  He is admitted to [facility] September 20 with progressive right knee pain occurring over the past week preceding admission.  He thinks he might of twisted it but ended up with progressive redness/swelling and pain.  No specific blunt force or penetrating trauma.  he was evaluated by orthopedics and taken to the operating room for right knee incision/drainage and concern for septic arthritis per Dr. Bentley; blood cultures had  MRSA.  Had dysuria but urinalysis not consistent with UTI.  No hematuria or pyuria     9/23 with TV echodensity; 9/26/22  TYRESE no vegetation per cardiology; daptomycin maintained, transferred to [place] with care taken over by Dr. Giordano; repeat blood cultures there on October 18 and October 20 and October 22 (dapto STU = 1)  with MRSA; blood cultures on October 24 and November 12 were negative per microbiology.  Repeat blood cultures November 20 with MRSA and daptomycin STU equal to 3, not susceptible per my discussion with microbiology; blood cultures positive again on November 22 but negative November 23. per Dr Giordano, teflaro had been added and concern regarding right knee  with increased swelling/pain prompted transitioned back to [place] for further right knee surgery on November 30 by Dr Bentley     12/1/22 postop right knee with controlled pain,  dull at present,  Better controlled per nursing overall, nonradiating, worse with movement, better with pain meds and 2 out of 10 in severity at present; he denies any other focal musculoskeletal pain.  No new back pain but he does relate chronic lumbar pain, dull at present, worse with movement, not progressive and no new weakness or numbness. No myalgia     Left foot with no redness/swelling or pain.  Surgical site healed with no open wound or active drainage. Has a left arm midline     No headache photophobia or neck stiffness.  No nausea vomiting diarrhea or abdominal pain.  No shortness of breath cough or hemoptysis.  No other new skin rash.  No other recent procedures or interventions.    No indwelling pacemaker     Past Medical History:   Diagnosis Date   • Diabetes mellitus (HCC)     4 times per day gets checked for sugar at rehab   • Dizzy    • Hyperlipidemia    • Hypertension    • Hypothyroid 11/30/2022   • MRSA infection     blood -   2022   • Orthostatic hypotension    • Pressure sore on buttocks     top crack -  sees wound - but now rn take care of it at rehab - minor opening - dime size - pat nurse didnt assess-  pt states getting better   • Pyogenic arthritis of right knee joint, due to unspecified organism ([facility]) 09/21/2022   • Sepsis ([facility])    • Wears glasses     readers       Past Surgical History:   Procedure Laterality Date   • ABSCESS DRAINAGE  2004    PERINEUM   • AMPUTATION FOOT Left 05/18/2022    Procedure: AMPUTATION FOOT;  Surgeon: Addison Colby MD;  Location: [facility] OR;  Service: Podiatry;  Laterality: Left;   • INCISION AND DRAINAGE LEG Left 05/10/2022    Procedure: INCISION AND DRAINAGE LOWER EXTREMITY;  Surgeon: Addison Colby MD;  Location: [facility] OR;  Service: Podiatry;  Laterality: Left;   • KNEE  INCISION AND DRAINAGE Right 09/21/2022    Procedure: KNEE INCISION AND DRAINAGE RIGHT;  Surgeon: Brain Bentley MD;  Location:  [facility] OR;  Service: Orthopedics;  Laterality: Right;   • PERIPHERALLY INSERTED CENTRAL CATHETER INSERTION Left    • WOUND DEBRIDEMENT Left 05/13/2022    Procedure: DEBRIDEMENT FOOT;  Surgeon: Addison Colby MD;  Location: [facility] OR;  Service: Podiatry;  Laterality: Left;       Pediatric History   Patient Parents   • Not on file     Other Topics Concern   • Not on file   Social History Narrative   • Not on file       family history is not on file.  High blood pressure in mom/dad    No Known Allergies    Medication:  Current Facility-Administered Medications   Medication Dose Route Frequency Provider Last Rate Last Admin   • acetaminophen (TYLENOL) tablet 1,000 mg  1,000 mg Oral Q8H Brain Bentley MD   1,000 mg at 12/01/22 0533   • aspirin EC tablet 81 mg  81 mg Oral Q12H Brain Bentley MD   81 mg at 12/01/22 0812   • ceftaroline (TEFLARO) 600 mg/100 mL 0.9% NS (mbp)  600 mg Intravenous Q12H Bryce Euceda MD       • cyclobenzaprine (FLEXERIL) tablet 5 mg  5 mg Oral TID PRN Tiffanie Cornell APRN       • DAPTOmycin (CUBICIN) 650 mg in sodium chloride 0.9 % 50 mL IVPB  8 mg/kg (Adjusted) Intravenous Q24H Bryce Euceda MD       • dextrose (D50W) (25 g/50 mL) IV injection 25 g  25 g Intravenous Q15 Min PRN Tiffanie Cornell APRN       • dextrose (GLUTOSE) oral gel 15 g  15 g Oral Q15 Min PRN Tiffanie Cornell APRN       • diphenhydrAMINE (BENADRYL) capsule 25 mg  25 mg Oral Q6H PRN Brain Bentley MD        Or   • diphenhydrAMINE (BENADRYL) injection 25 mg  25 mg Intravenous Q6H PRN Brain Bentley MD       • glucagon (human recombinant) (GLUCAGEN DIAGNOSTIC) injection 1 mg  1 mg Intramuscular Q15 Min PRN Tiffanie Cornell APRN       • HYDROmorphone (DILAUDID) injection 0.5 mg  0.5 mg Intravenous Q2H PRN Brain Bentley MD   0.5 mg at 11/30/22 7863    And   • naloxone  (NARCAN) injection 0.1 mg  0.1 mg Intravenous Q5 Min PRN Brain Bentley MD       • insulin detemir (LEVEMIR) injection 17 Units  17 Units Subcutaneous Q12H Speed TiffanieROSALINA beaulieu   17 Units at 12/01/22 0811   • Insulin Lispro (humaLOG) injection 0-7 Units  0-7 Units Subcutaneous TID AC Speed Tiffanie, APRN   3 Units at 12/01/22 0812   • Insulin Lispro (humaLOG) injection 4 Units  4 Units Subcutaneous TID With Meals Speed Tiffanie, APRN   4 Units at 12/01/22 0811   • ketorolac (TORADOL) injection 15 mg  15 mg Intravenous Q6H PRN Brain Bentley MD       • labetalol (NORMODYNE,TRANDATE) injection 10 mg  10 mg Intravenous Q4H PRN Speed Tiffanie, APRAMADA       • lactated ringers infusion  9 mL/hr Intravenous Continuous Brain Bentley MD   Stopped at 11/30/22 1306   • lactated ringers infusion  100 mL/hr Intravenous Continuous Brain Bentley  mL/hr at 12/01/22 0540 100 mL/hr at 12/01/22 0540   • levothyroxine (SYNTHROID, LEVOTHROID) tablet 25 mcg  25 mcg Oral Q AM Speed Tiffanie, ROSALINA   25 mcg at 12/01/22 0533   • meloxicam (MOBIC) tablet 15 mg  15 mg Oral Daily Brain Bentley MD   15 mg at 12/01/22 0812   • midodrine (PROAMATINE) tablet 2.5 mg  2.5 mg Oral TID [facility] Tiffanie, APRN   2.5 mg at 12/01/22 0812   • ondansetron (ZOFRAN) tablet 4 mg  4 mg Oral Q6H PRN Brain Bentley MD        Or   • ondansetron (ZOFRAN) injection 4 mg  4 mg Intravenous Q6H PRN Brain Bentley MD       • oxyCODONE (ROXICODONE) immediate release tablet 10 mg  10 mg Oral Q4H PRN Brain Bentley MD   10 mg at 12/01/22 0121   • oxyCODONE (ROXICODONE) immediate release tablet 5 mg  5 mg Oral Q4H PRN Brain Bentley MD   5 mg at 11/30/22 1914   • pantoprazole (PROTONIX) EC tablet 40 mg  40 mg Oral Q AM Tiffanie Cornell APRN   40 mg at 12/01/22 0533   • polyethylene glycol (MIRALAX) packet 17 g  17 g Oral Daily Tiffanie Cornell APRN   17 g at 12/01/22 0810   • pregabalin (LYRICA) capsule 150 mg  150 mg Oral Q12H  Rose Diaz MD   150 mg at 12/01/22 0812   • ropivacaine (NAROPIN) 0.2 % infusion (INFUSYSTEM)   Peripheral Nerve Continuous Brain Bentley MD   1,000 mg at 11/30/22 1357   • sodium chloride 0.9 % bolus 500 mL  500 mL Intravenous TID PRN Tiffanie Cornell APRN       • traMADol (ULTRAM) tablet 50 mg  50 mg Oral Q8H PRN Brain Bentley MD   50 mg at 12/01/22 0812       Antibiotics:  Anti-Infectives (From admission, onward)    Ordered     Dose/Rate Route Frequency Start Stop    12/01/22 0826  ceftaroline (TEFLARO) 600 mg/100 mL 0.9% NS (mbp)        Ordering Provider: Bryce Euceda MD    600 mg Intravenous Every 12 Hours 12/01/22 0915 12/15/22 0914    12/01/22 0739  DAPTOmycin (CUBICIN) 650 mg in sodium chloride 0.9 % 50 mL IVPB        Ordering Provider: Bryce Euceda MD    8 mg/kg × 79.4 kg (Adjusted)  100 mL/hr over 30 Minutes Intravenous Every 24 Hours 12/01/22 0900 01/12/23 0859    11/30/22 1449  ceFAZolin in dextrose (ANCEF) IVPB solution 2 g        Ordering Provider: Brain Bentley MD    2 g  over 30 Minutes Intravenous Every 8 Hours 11/30/22 1800 12/01/22 0151    11/30/22 0815  ceFAZolin in dextrose (ANCEF) IVPB solution 2 g        Ordering Provider: Brain Bentley MD    2 g  over 30 Minutes Intravenous Once 11/30/22 0817 11/30/22 1045    11/30/22 0815  vancomycin 1250 mg/250 mL 0.9% NS IVPB ([facility])        Ordering Provider: Brain Bentley MD    15 mg/kg × 88.9 kg Intravenous Once 11/30/22 0817 11/30/22 0902            Review of Systems    Constitutional-- No Fever, chills or sweats.  Appetite diminished with fatigue.  Heent-- No new vision, hearing or throat complaints.  No epistaxis or oral sores.  Denies odynophagia or dysphagia.  No flashers, floaters or eye pain.   No headache, photophobia or neck stiffness.  CV-- No chest pain, palpitation or syncope  Resp-- No SOB/cough/Hemoptysis  GI- No nausea, vomiting, or diarrhea.  No hematochezia, melena, or hematemesis. Denies  jaundice  -- No dysuria, hematuria, or flank pain.  Denies hesitancy, urgency or flank pain.  Lymph- no swollen lymph nodes in neck/axilla or groin.   Heme- No active bruising or bleeding; no Hx of DVT or PE.  MS-- no swelling or pain in the bones or joints of arms/legs.  No new back pain.  Neuro-- No acute focal weakness or numbness in the arms or legs.  No seizures.     Full 12 point review of systems reviewed and negative otherwise for acute complaints, except for above    Physical Exam:   Vital Signs   /68 (BP Location: Right arm, Patient Position: Lying)   Pulse 94   Temp 97.9 °F (36.6 °C) (Oral)   Resp 16   SpO2 100%     GENERAL: Awake and alert, in no acute distress.  Chronically ill-appearing  HEENT: Normocephalic, atraumatic.  PERRL. EOMI. No conjunctival injection. No icterus. Oropharynx clear without evidence of thrush or exudate. No evidence of peridontal disease.    NECK: Supple without nuchal rigidity. No mass.  LYMPH: No cervical, axillary or inguinal lymphadenopathy.  HEART: RRR; soft murmur LSB, rubs, gallops.   LUNGS: Diminished at bases but otherwise clear to auscultation bilaterally without wheezing, rales, rhonchi. Normal respiratory effort. Nonlabored. No dullness.  ABDOMEN: Soft, nontender, nondistended. Positive bowel sounds. No rebound or guarding. NO mass or HSM.  EXT:  No cyanosis, clubbing or edema. No cord.  : Genitalia generally unremarkable.  Without Ortiz catheter.  MSK: FROM without joint effusions noted arms/legs.    SKIN: Warm and dry without cutaneous eruptions on Inspection/palpation.    NEURO: Oriented to PPT. No focal deficits on motor/sensory exam at arms/legs.  PSYCHIATRIC: Normal insight and judgement. Cooperative with PE     Left foot with no redness induration or warmth.  No discrete mass bulge or fluctuance.  No crepitus or bulla.  Prior surgical site healed at [facility].  No open wound or active drainage     Right knee postop surgical site covered.  Exposed skin  without obvious redness induration or warmth.  No discrete mass bulge or fluctuance.  No crepitus or bulla.     No peripheral stigmata/phenomena of endocarditis     IV without obvious redness or drainage  Laboratory Data    Results from last 7 days   Lab Units 11/28/22  1256 11/26/22  0139   WBC 10*3/mm3 5.90 4.20   HEMOGLOBIN g/dL 11.1* 8.7*   HEMATOCRIT % 36.0* 27.3*   PLATELETS 10*3/mm3 144 111*     Results from last 7 days   Lab Units 11/28/22  1256   SODIUM mmol/L 133*   POTASSIUM mmol/L 3.9   CHLORIDE mmol/L 97*   CO2 mmol/L 23.0   BUN mg/dL 11   CREATININE mg/dL 0.63*   GLUCOSE mg/dL 286*   CALCIUM mg/dL 8.5*     Results from last 7 days   Lab Units 11/28/22  1256   ALK PHOS U/L 294*   BILIRUBIN mg/dL 0.4   ALT (SGPT) U/L 27   AST (SGOT) U/L 46*     Results from last 7 days   Lab Units 11/28/22  1256   SED RATE mm/hr 79*     Results from last 7 days   Lab Units 11/28/22  1256   CRP mg/dL 3.52*       Estimated Creatinine Clearance: 145.3 mL/min (A) (by C-G formula based on SCr of 0.63 mg/dL (L)).      Microbiology:      Radiology:  Imaging Results (Last 72 Hours)     Procedure Component Value Units Date/Time    XR Knee 1 or 2 View Right [[id number]] Collected: 11/30/22 1542     Updated: 11/30/22 1545    Narrative:      DATE OF EXAM: 11/30/2022 3:20 PM     PROCEDURE: XR KNEE 1 OR 2 VW RIGHT-     INDICATIONS: Post-Op Knee Arthoplasty; M00.9-Pyogenic arthritis,  unspecified     COMPARISON: 9/20/2022     TECHNIQUE: One to two radiologic views of the right knee were obtained.     FINDINGS:  Postsurgical findings of interval right total knee arthroplasty without  evidence of immediate hardware complication. Expected soft tissue edema  and subcutaneous emphysema.        Impression:      Postsurgical findings of interval right total knee arthroplasty without  evidence of immediate hardware complication. Expected soft tissue edema  and subcutaneous emphysema.      This report was finalized on 11/30/2022 3:42 PM by  "Garry Gonzalez.               Impression:     --Acute/recurrent/persistent MRSA bacteremia/septicemia; prior history positive cultures May 2022 and recurrent despite prior  IV vancomycin and left foot surgery in addition to other supportive measures.   TTE with ? TV echodensity prior admit in September and TYRESE no vegetation at that time at [facility];   blood culture positivity again at [facility] in October/November as above.  Daptomycin STU increased to 3 on 11/20 isolate and not sensitive to daptomycin per microbiology.  Discussed on several occasions with Dr. Giordano; had a repeat TYRESE on October 31 with mention of \" moderate size echodense structure above the posterior tricuspid leaflet but not attached to the leaflet\" per cardiology there, described potentially as a \"normal variant\" per their note.  High risk for further serious morbidity and other serious sequela including persistent/progressive or recurrent infection, metastatic foci of involvement and other dire consequences;  vancomycin sensitivity maintain albeit with STU =2; adjusted to vancomycin/ceftaroline in light of daptomycin resistance and I asked microbiology for additional sensitivity data as could require additional adjustments    **Blood cultures November 23 negative so far     --Acute right knee pain/septic arthritis with surgical intervention, incision/debridement by Dr. Bentley on September 21.   MRSA+ in the setting of MRSA bacteremia at that time;  Repeat surgery 11/30, d/w Dr Bentley.     --History of cirrhosis by records.  Unclear etiology.   further GI referral/eval per  medicine team     --Diabetes.  You need to tightly control blood sugar to give best chance for healing.;  Described as uncontrolled by medicine team at admission    -- Chronic lumbar back pain.  This is not new and present for years per patient.  No new exacerbation or new neurologic symptomatology in his extremities.  Nonetheless, may require further imaging " depending on his clinical course to help exclude any other sequestered/spinal-paraspinal focus     --TCP ; monitor     PLAN:       -- IV vancomycin/ceftaroline     -- Check/review labs cultures and scans     -- Partial history per nursing staff    --d/w pharmacy       -- Highly complex set of issues with high risk for further serious morbidity and other serious sequela    -- Discussed with microbiology; they are doing further assessment to blood cultures drawn November 22    --d/w Dr Giordano and Dr Bentley and Dr Emily Euceda MD  12/1/2022

## 2022-12-01 NOTE — PROGRESS NOTES
YVONNE Jensen    Acute pain service Inpatient Progress Note    Patient Name: Melchor Hardwick III  :  1965  MRN:  [id number]        Acute Pain  Service Inpatient Progress Note:    Analgesia:Excellent  Pain Score:0/10  LOC: alert and awake  Resp Status: room air  Cardiac: VS stable  Side Effects:None  Catheter Site:clean, dressing intact and dry  Cath type: peripheral nerve cath with ON Q  Volume: 1mL,8ml, 8ml InfuSystem Pump.  Dosing/Volume: ropivacaine 0.2%  Catheter Plan:Catheter to remain Insitu and Continue catheter infusion rate unchanged  Comments:

## 2022-12-02 ENCOUNTER — APPOINTMENT (OUTPATIENT)
Dept: MRI IMAGING | Facility: HOSPITAL | Age: 57
End: 2022-12-02

## 2022-12-02 LAB
ANION GAP SERPL CALCULATED.3IONS-SCNC: 8 MMOL/L (ref 5–15)
BACTERIA SPEC AEROBE CULT: ABNORMAL
BUN SERPL-MCNC: 13 MG/DL (ref 6–20)
BUN/CREAT SERPL: 20.6 (ref 7–25)
CALCIUM SPEC-SCNC: 7.7 MG/DL (ref 8.6–10.5)
CHLORIDE SERPL-SCNC: 103 MMOL/L (ref 98–107)
CO2 SERPL-SCNC: 27 MMOL/L (ref 22–29)
CREAT SERPL-MCNC: 0.63 MG/DL (ref 0.76–1.27)
EGFRCR SERPLBLD CKD-EPI 2021: 110.9 ML/MIN/1.73
GLUCOSE BLDC GLUCOMTR-MCNC: 120 MG/DL (ref 70–130)
GLUCOSE BLDC GLUCOMTR-MCNC: 135 MG/DL (ref 70–130)
GLUCOSE BLDC GLUCOMTR-MCNC: 136 MG/DL (ref 70–130)
GLUCOSE BLDC GLUCOMTR-MCNC: 143 MG/DL (ref 70–130)
GLUCOSE SERPL-MCNC: 167 MG/DL (ref 65–99)
GRAM STN SPEC: ABNORMAL
GRAM STN SPEC: ABNORMAL
HCT VFR BLD AUTO: 25 % (ref 37.5–51)
HGB BLD-MCNC: 7.9 G/DL (ref 13–17.7)
ISOLATED FROM: ABNORMAL
POTASSIUM SERPL-SCNC: 3.8 MMOL/L (ref 3.5–5.2)
SODIUM SERPL-SCNC: 138 MMOL/L (ref 136–145)

## 2022-12-02 PROCEDURE — 63710000001 INSULIN LISPRO (HUMAN) PER 5 UNITS: Performed by: INTERNAL MEDICINE

## 2022-12-02 PROCEDURE — 63710000001 INSULIN DETEMIR PER 5 UNITS: Performed by: INTERNAL MEDICINE

## 2022-12-02 PROCEDURE — 25010000002 CEFTAROLINE FOSAMIL PER 10 MG

## 2022-12-02 PROCEDURE — 97166 OT EVAL MOD COMPLEX 45 MIN: CPT

## 2022-12-02 PROCEDURE — 85014 HEMATOCRIT: CPT | Performed by: ORTHOPAEDIC SURGERY

## 2022-12-02 PROCEDURE — C1751 CATH, INF, PER/CENT/MIDLINE: HCPCS

## 2022-12-02 PROCEDURE — 97110 THERAPEUTIC EXERCISES: CPT

## 2022-12-02 PROCEDURE — 97530 THERAPEUTIC ACTIVITIES: CPT

## 2022-12-02 PROCEDURE — 97165 OT EVAL LOW COMPLEX 30 MIN: CPT

## 2022-12-02 PROCEDURE — 85018 HEMOGLOBIN: CPT | Performed by: ORTHOPAEDIC SURGERY

## 2022-12-02 PROCEDURE — 97535 SELF CARE MNGMENT TRAINING: CPT

## 2022-12-02 PROCEDURE — 97116 GAIT TRAINING THERAPY: CPT

## 2022-12-02 PROCEDURE — 82962 GLUCOSE BLOOD TEST: CPT

## 2022-12-02 PROCEDURE — 25010000002 VANCOMYCIN 10 G RECONSTITUTED SOLUTION

## 2022-12-02 PROCEDURE — 80048 BASIC METABOLIC PNL TOTAL CA: CPT | Performed by: ORTHOPAEDIC SURGERY

## 2022-12-02 PROCEDURE — C1894 INTRO/SHEATH, NON-LASER: HCPCS

## 2022-12-02 RX ORDER — SODIUM CHLORIDE 0.9 % (FLUSH) 0.9 %
10 SYRINGE (ML) INJECTION AS NEEDED
Status: DISCONTINUED | OUTPATIENT
Start: 2022-12-02 | End: 2022-12-15 | Stop reason: HOSPADM

## 2022-12-02 RX ORDER — SODIUM CHLORIDE 0.9 % (FLUSH) 0.9 %
10 SYRINGE (ML) INJECTION EVERY 12 HOURS SCHEDULED
Status: DISCONTINUED | OUTPATIENT
Start: 2022-12-02 | End: 2022-12-15 | Stop reason: HOSPADM

## 2022-12-02 RX ADMIN — MIDODRINE HYDROCHLORIDE 2.5 MG: 5 TABLET ORAL at 12:04

## 2022-12-02 RX ADMIN — ACETAMINOPHEN 1000 MG: 500 TABLET, FILM COATED ORAL at 06:22

## 2022-12-02 RX ADMIN — INSULIN DETEMIR 20 UNITS: 100 INJECTION, SOLUTION SUBCUTANEOUS at 08:25

## 2022-12-02 RX ADMIN — PREGABALIN 150 MG: 150 CAPSULE ORAL at 21:12

## 2022-12-02 RX ADMIN — INSULIN LISPRO 7 UNITS: 100 INJECTION, SOLUTION INTRAVENOUS; SUBCUTANEOUS at 17:15

## 2022-12-02 RX ADMIN — VANCOMYCIN HYDROCHLORIDE 1250 MG: 10 INJECTION, POWDER, LYOPHILIZED, FOR SOLUTION INTRAVENOUS at 12:04

## 2022-12-02 RX ADMIN — CEFTAROLINE FOSAMIL 600 MG: 600 POWDER, FOR SOLUTION INTRAVENOUS at 14:11

## 2022-12-02 RX ADMIN — ASPIRIN 81 MG: 81 TABLET, COATED ORAL at 08:25

## 2022-12-02 RX ADMIN — ACETAMINOPHEN 1000 MG: 500 TABLET, FILM COATED ORAL at 21:12

## 2022-12-02 RX ADMIN — INSULIN LISPRO 7 UNITS: 100 INJECTION, SOLUTION INTRAVENOUS; SUBCUTANEOUS at 12:04

## 2022-12-02 RX ADMIN — OXYCODONE 5 MG: 5 TABLET ORAL at 12:04

## 2022-12-02 RX ADMIN — POLYETHYLENE GLYCOL 3350 17 G: 17 POWDER, FOR SOLUTION ORAL at 08:24

## 2022-12-02 RX ADMIN — MIDODRINE HYDROCHLORIDE 2.5 MG: 5 TABLET ORAL at 06:22

## 2022-12-02 RX ADMIN — PANTOPRAZOLE SODIUM 40 MG: 40 TABLET, DELAYED RELEASE ORAL at 06:22

## 2022-12-02 RX ADMIN — INSULIN DETEMIR 20 UNITS: 100 INJECTION, SOLUTION SUBCUTANEOUS at 21:13

## 2022-12-02 RX ADMIN — ACETAMINOPHEN 1000 MG: 500 TABLET, FILM COATED ORAL at 14:11

## 2022-12-02 RX ADMIN — VANCOMYCIN HYDROCHLORIDE 1250 MG: 10 INJECTION, POWDER, LYOPHILIZED, FOR SOLUTION INTRAVENOUS at 21:18

## 2022-12-02 RX ADMIN — OXYCODONE 5 MG: 5 TABLET ORAL at 08:25

## 2022-12-02 RX ADMIN — OXYCODONE 5 MG: 5 TABLET ORAL at 17:15

## 2022-12-02 RX ADMIN — ASPIRIN 81 MG: 81 TABLET, COATED ORAL at 21:12

## 2022-12-02 RX ADMIN — MELOXICAM 15 MG: 15 TABLET ORAL at 08:25

## 2022-12-02 RX ADMIN — Medication 10 ML: at 13:03

## 2022-12-02 RX ADMIN — PREGABALIN 150 MG: 150 CAPSULE ORAL at 08:25

## 2022-12-02 RX ADMIN — LEVOTHYROXINE SODIUM 25 MCG: 25 TABLET ORAL at 06:22

## 2022-12-02 RX ADMIN — INSULIN LISPRO 7 UNITS: 100 INJECTION, SOLUTION INTRAVENOUS; SUBCUTANEOUS at 08:24

## 2022-12-02 RX ADMIN — MIDODRINE HYDROCHLORIDE 2.5 MG: 5 TABLET ORAL at 17:16

## 2022-12-02 RX ADMIN — Medication 10 ML: at 21:13

## 2022-12-02 NOTE — PLAN OF CARE
Goal Outcome Evaluation:   Patient has been resting comfortably with no acute signs of distress. VSS. AO x4. No complaints of pain overnight. Dressing C/D/I. Hemovac output minimal. Wound vac output none. Nerve cath in place an infusing. Immobilizer in place at all times. Will continue to monitor as patient progresses in their care.

## 2022-12-02 NOTE — NURSING NOTE
Prior to central line removal, order for the removal of catheter was verified, patient was assessed, necessary materials were gathered and patient was educated regarding procedure .    Patient was positioned flat to ensure that the insertion site was at or below the level of the heart.    Hands were washed, clean gloves were applied and central line dressing was gently removed. Catheter exit site was not cultured.     A new pair of clean gloves were then applied. Insertion site was cleansed with 2% Chlorhexidine swab using a circular motion beginning at the insertion site and moving outward for 30 seconds and allowed to dry.     Clamp on line was not present.     Patient was instructed to perform Valsalva maneuver as catheter was withdrawn.     The central line was grasped at the insertion site and slowly pulled outward parallel to the skin. Resistance was not met.    After central line was completely removed, a sterile 4x4 gauze pad was used to apply light pressure until bleeding stopped. At that time, petroleum-based gauze and a sterile occlusive dressing was applied to exit site.     Patient was instructed to keep dressing in place for at least 24 hours and to remain in a flat or reclining position for 30 minutes post-removal.     Catheter was inspected after removal and Intact. Tip of central line was not sent for culture. Patient tolerated procedure.

## 2022-12-02 NOTE — PLAN OF CARE
TYRESE completed.  Mid Line in place but not to be used.   MD notified.  IV ABX administered through peripheral IV access.  Immobilizer in place over ACE over prevena WV with INFU pump.  Pain controlled with PRN medication.     BG elevated and treated.

## 2022-12-02 NOTE — PLAN OF CARE
Goal Outcome Evaluation:  Plan of Care Reviewed With: patient        Progress: no change (OT IE)  Outcome Evaluation: OT evaluation completed. Pt presents with decreased I in ADLs, related t/fs, mobility compared to PLOF limited by decreased functional endurance toward more dynamic tasks, mild muscle weakness at BUEs, impaired balance, decreased distal reach toward LEs in part limited by KI donned AAT. OT issued LH reacher and sock aid to assist with LBD and improved performance anticipated as pt dep for d/d socks and LB garments at R side w/o use. Pt able to verbalize correct use of AE with some prior use with previous sxs. Reviewed with pt optimal position, seq, tech for LBD, no showering while nerve cath and wound vac, drain in place. Req'd min A for d/d gown, SUA for hand/face washing and oral care. Gross min A in STS t/fs and min A x 1-2 for SPT to recliner with FWW with increased time for seq with report of increased pain with weight bearing. Pt presented with decreased BP in sequential positions however denied s/s. RN aware, improved in return to sitting. Recommend IPOT POC and IRF at d/c based on underlying impairments and impact on fxn.

## 2022-12-02 NOTE — THERAPY TREATMENT NOTE
Patient Name: Melchor Hardwick III  : 1965    MRN: [id number]                              Today's Date: 2022       Admit Date: 2022    Visit Dx:     ICD-10-CM ICD-9-CM   1. Infection of right knee (HCC)  M00.9 686.9     Patient Active Problem List   Diagnosis   • Hyperlipidemia   • Hypertensive disorder   • Obesity   • Type 2 diabetes mellitus with hyperglycemia, with long-term current use of insulin (HCC)   • Gas gangrene of foot (HCC)   • Cellulitis in diabetic foot (HCC)   • Other acute osteomyelitis of left foot (HCC)   • Osteomyelitis (HCC)   • Critical illness myopathy   • Staphylococcal arthritis of right knee (HCC)   • Other cirrhosis of liver (HCC)   • MRSA bacteremia   • Endocarditis of tricuspid valve   • Wound of right buttock   • History of osteomyelitis   • Debility   • Infection of right knee (HCC)   • S/P right knee implant removal with insertion of antibiotic spacer   • Hypothyroid     Past Medical History:   Diagnosis Date   • Diabetes mellitus (HCC)     4 times per day gets checked for sugar at rehab   • Dizzy    • Hyperlipidemia    • Hypertension    • Hypothyroid 2022   • MRSA infection     blood -      • Orthostatic hypotension    • Pressure sore on buttocks     top crack -  sees wound - but now rn take care of it at rehab - minor opening - dime size - pat nurse didnt assess-  pt states getting better   • Pyogenic arthritis of right knee joint, due to unspecified organism (HCC) 2022   • Sepsis (HCC)    • Wears glasses     readers     Past Surgical History:   Procedure Laterality Date   • ABSCESS DRAINAGE  2004    PERINEUM   • AMPUTATION FOOT Left 2022    Procedure: AMPUTATION FOOT;  Surgeon: Addison Colby MD;  Location: [facility];  Service: Podiatry;  Laterality: Left;   • INCISION AND DRAINAGE LEG Left 05/10/2022    Procedure: INCISION AND DRAINAGE LOWER EXTREMITY;  Surgeon: Addison Colby MD;  Location: [facility] OR;  Service: Podiatry;  Laterality:  Left;   • KNEE INCISION AND DRAINAGE Right 09/21/2022    Procedure: KNEE INCISION AND DRAINAGE RIGHT;  Surgeon: Brain Bentley MD;  Location:  [facility] OR;  Service: Orthopedics;  Laterality: Right;   • PERIPHERALLY INSERTED CENTRAL CATHETER INSERTION Left    • TOTAL KNEE  PROSTHESIS REMOVAL W/ SPACER INSERTION Right 11/30/2022    Procedure: ANTIBIOTIC SPACER PLACEMENT KNEE - RIGHT;  Surgeon: Brain Bentley MD;  Location:  [facility] OR;  Service: Orthopedics;  Laterality: Right;   • WOUND DEBRIDEMENT Left 05/13/2022    Procedure: DEBRIDEMENT FOOT;  Surgeon: Addison Colby MD;  Location:  [facility] OR;  Service: Podiatry;  Laterality: Left;      General Information     Row Name 12/02/22 1037          Physical Therapy Time and Intention    Document Type therapy note (daily note)  -AS     Mode of Treatment physical therapy  -AS     Row Name 12/02/22 1037          General Information    Patient Profile Reviewed yes  -AS     Existing Precautions/Restrictions fall;brace on at all times;other (see comments);orthostatic hypotension  WBAT R LE, K.I. on at all times, hemovac, nerve catheter, L walking boot for h/o transmetatarsal amputations, orthostatic hypotension-usually wears abdominal binder  -AS     Barriers to Rehab medically complex;previous functional deficit  -AS     Row Name 12/02/22 1037          Cognition    Orientation Status (Cognition) oriented x 4  -AS     Row Name 12/02/22 1037          Safety Issues, Functional Mobility    Safety Issues Affecting Function (Mobility) safety precaution awareness;positioning of assistive device;sequencing abilities  -AS     Impairments Affecting Function (Mobility) balance;endurance/activity tolerance;pain;range of motion (ROM);postural/trunk control;strength  -AS     Comment, Safety Issues/Impairments (Mobility) patient alert and following commands, orthostatic hyopotension limiting tolerance to mobility this date c/o dizziness with prolonged standing  -AS           User Key  (r)  = Recorded By, (t) = Taken By, (c) = Cosigned By    Initials Name Provider Type    AS Ana Cristina Gutierrez PTA Physical Therapist Assistant               Mobility     Row Name 12/02/22 1041          Bed Mobility    Sit-Supine [place] (Bed Mobility) verbal cues;minimum assist (75% patient effort);1 person assist  -AS     Assistive Device (Bed Mobility) head of bed elevated;bed rails  -AS     Comment, (Bed Mobility) assist with B LE back to bed, assist with line management  -AS     Row Name 12/02/22 1041          Transfers    Comment, (Transfers) cues for R LE placement pre/post transfers  -AS     Row Name 12/02/22 1041          Bed-Chair Transfer    Bed-Chair [place] (Transfers) verbal cues;minimum assist (75% patient effort);1 person assist  -AS     Assistive Device (Bed-Chair Transfers) walker, front-wheeled  -AS     Comment, (Bed-Chair Transfer) increased time for transition to standing position  -AS     Row Name 12/02/22 1041          Sit-Stand Transfer    Sit-Stand [place] (Transfers) verbal cues;minimum assist (75% patient effort);1 person assist;1 person to manage equipment  -AS     Assistive Device (Sit-Stand Transfers) walker, front-wheeled  -AS     Row Name 12/02/22 1041          Gait/Stairs (Locomotion)    [place] Level (Gait) verbal cues;minimum assist (75% patient effort);1 person assist;1 person to manage equipment  -AS     Assistive Device (Gait) walker, front-wheeled  -AS     Distance in Feet (Gait) 8  -AS     Deviations/Abnormal Patterns (Gait) right sided deviations;antalgic;stride length decreased;weight shifting decreased;ataxic;yao decreased;gait speed decreased  -AS     Bilateral Gait Deviations forward flexed posture;heel strike decreased  -AS     Comment, (Gait/Stairs) Patient took 8 steps from recliner to bed with Min assist x1, tech assist with IV pole and rolling walker for support. Verbal cues for step sequencing and walker placement. Gait distance limited by  orthostatic hypotension and c/o dizziness. Nursing notified and aware.  -AS     Row Name 12/02/22 1041          Mobility    Extremity Weight-bearing Status left lower extremity;right lower extremity  -AS     Left Lower Extremity (Weight-bearing Status) weight-bearing as tolerated (WBAT);other (see comments)  w/walking boot  -AS     Right Lower Extremity (Weight-bearing Status) weight-bearing as tolerated (WBAT);other (see comments)  K.I at all times for 2 weeks  -AS           User Key  (r) = Recorded By, (t) = Taken By, (c) = Cosigned By    Initials Name Provider Type    AS Ana Cristina Gutierrez PTA Physical Therapist Assistant               Obj/Interventions     Row Name 12/02/22 1046          Knee (Therapeutic Exercise)    Knee (Therapeutic Exercise) isometric exercises  -AS     Knee Isometrics (Therapeutic Exercise) bilateral;gluteal sets;quad sets;supine;10 repetitions;5 second hold  -AS     Knee Strengthening (Therapeutic Exercise) bilateral;heel slides;supine;10 repetitions  -AS     Row Name 12/02/22 1046          Ankle (Therapeutic Exercise)    Ankle (Therapeutic Exercise) AROM (active range of motion)  -AS     Ankle AROM (Therapeutic Exercise) dorsiflexion;plantarflexion;10 repetitions;right;supine  -AS     Row Name 12/02/22 1046          Balance    Dynamic Standing Balance verbal cues;minimal assist;1-person assist;1 person to manage equipment  -AS     Position/Device Used, Standing Balance supported;walker, front-wheeled  -AS           User Key  (r) = Recorded By, (t) = Taken By, (c) = Cosigned By    Initials Name Provider Type    AS Ana Cristina Gutierrez PTA Physical Therapist Assistant               Goals/Plan    No documentation.                Clinical Impression     Row Name 12/02/22 1048          Pain    Pretreatment Pain Rating 3/10  -AS     Posttreatment Pain Rating 3/10  -AS     Pain Location - Side/Orientation Right  -AS     Pain Location - knee  -AS     Pain Intervention(s)  Repositioned;Ambulation/increased activity  -AS     Row Name 12/02/22 1048          Plan of Care Review    Plan of Care Reviewed With patient  -AS     Progress no change  -AS     Outcome Evaluation Patient took 8 steps from recliner to bed with Min assist x1, tech assist with IV pole and rolling walker for support. Verbal cues for step sequencing and walker placement. Gait distance limited by orthostatic hypotension and c/o dizziness. Nursing notified and aware. B LE isometrics completed. Nursing notified and aware of BP readings.  -AS     Row Name 12/02/22 1048          Vital Signs    Pre Systolic BP Rehab 112  -AS     Pre Treatment Diastolic BP 68  -AS     Intra Systolic BP Rehab 87  -AS     Intra Treatment Diastolic BP 54  84/50  -AS     Post Systolic BP Rehab 140  -AS     Post Treatment Diastolic BP 63  -AS     Pre SpO2 (%) 98  -AS     O2 Delivery Pre Treatment room air  -AS     Intra SpO2 (%) 100  -AS     O2 Delivery Intra Treatment room air  -AS     Post SpO2 (%) 98  -AS     O2 Delivery Post Treatment room air  -AS     Pre Patient Position Sitting  -AS     Intra Patient Position Standing  -AS     Post Patient Position Supine  -AS     Row Name 12/02/22 1048          Positioning and Restraints    Pre-Treatment Position sitting in chair/recliner  -AS     Post Treatment Position bed  -AS     In Bed supine;call light within reach;encouraged to call for assist;exit alarm on;notified nsg;R knee immobilizer  -AS           User Key  (r) = Recorded By, (t) = Taken By, (c) = Cosigned By    Initials Name Provider Type    AS Ana Cristina Gutierrez PTA Physical Therapist Assistant               Outcome Measures     Row Name 12/02/22 1051          How much help from another person do you currently need...    Turning from your back to your side while in flat bed without using bedrails? 3  -AS     Moving from lying on back to sitting on the side of a flat bed without bedrails? 3  -AS     Moving to and from a bed to a chair  (including a wheelchair)? 3  -AS     Standing up from a chair using your arms (e.g., wheelchair, bedside chair)? 3  -AS     Climbing 3-5 steps with a railing? 2  -AS     To walk in hospital room? 2  -AS     AM-PAC 6 Clicks Score (PT) 16  -AS     Highest level of mobility 5 --> Static standing  -AS     Row Name 12/02/22 1051 12/02/22 0934       Functional Assessment    Outcome Measure Options AM-PAC 6 Clicks Basic Mobility (PT)  -AS AM-PAC 6 Clicks Daily Activity (OT)  -ALTHEA          User Key  (r) = Recorded By, (t) = Taken By, (c) = Cosigned By    Initials Name Provider Type    AS Ana Cristina Gutierrez, PTA Physical Therapist Assistant    Jaja Morales, OT Occupational Therapist                             Physical Therapy Education     Title: PT OT SLP Therapies (In Progress)     Topic: Physical Therapy (In Progress)     Point: Mobility training (In Progress)     Learning Progress Summary           Patient Acceptance, E, NR by AS at 12/2/2022 1051    Eager, E, VU,DU,NR by  at 12/1/2022 0946    Comment: Educated pt. safety/technique with bed mobility, transfers, ambulation, WB status, use of knee immobilizer, PT POC                   Point: Home exercise program (In Progress)     Learning Progress Summary           Patient Acceptance, E, NR by AS at 12/2/2022 1051    Eager, E, VU,DU,NR by SS at 12/1/2022 0946    Comment: Educated pt. safety/technique with bed mobility, transfers, ambulation, WB status, use of knee immobilizer, PT POC                   Point: Body mechanics (In Progress)     Learning Progress Summary           Patient Acceptance, E, NR by AS at 12/2/2022 1051    Eager, E, VU,DU,NR by SS at 12/1/2022 0946    Comment: Educated pt. safety/technique with bed mobility, transfers, ambulation, WB status, use of knee immobilizer, PT POC                   Point: Precautions (In Progress)     Learning Progress Summary           Patient Acceptance, E, NR by AS at 12/2/2022 1051    Eager, E, VU,DU,NR by SS at  12/1/2022 0946    Comment: Educated pt. safety/technique with bed mobility, transfers, ambulation, WB status, use of knee immobilizer, PT POC                               User Key     Initials Effective Dates Name Provider Type Discipline    AS 06/16/21 -  Ana Cristina Gutierrez PTA Physical Therapist Assistant PT    SS 06/01/21 -  Susan Quesada PT Physical Therapist PT              PT Recommendation and Plan     Plan of Care Reviewed With: patient  Progress: no change  Outcome Evaluation: Patient took 8 steps from recliner to bed with Min assist x1, tech assist with IV pole and rolling walker for support. Verbal cues for step sequencing and walker placement. Gait distance limited by orthostatic hypotension and c/o dizziness. Nursing notified and aware. B LE isometrics completed. Nursing notified and aware of BP readings.     Time Calculation:    PT Charges     Row Name 12/02/22 1051             Time Calculation    Start Time 1018  -AS      PT Received On 12/02/22  -AS      PT Goal Re-Cert Due Date 12/11/22  -AS         Timed Charges    02120 - PT Therapeutic Exercise Minutes 10  -AS      66370 - Gait Training Minutes  13  -AS         Total Minutes    Timed Charges Total Minutes 23  -AS       Total Minutes 23  -AS            User Key  (r) = Recorded By, (t) = Taken By, (c) = Cosigned By    Initials Name Provider Type    AS Ana Cristina Gutierrez PTA Physical Therapist Assistant              Therapy Charges for Today     Code Description Service Date Service Provider Modifiers Qty    08517334517 HC PT THER PROC EA 15 MIN 12/2/2022 Ana Cristina Gutierrez PTA GP 1    01680759979 HC GAIT TRAINING EA 15 MIN 12/2/2022 Ana Cristina Gutierrez PTA GP 1    31536086906 HC PT THER SUPP EA 15 MIN 12/2/2022 Ana Cristina Gutierrez PTA GP 2          PT G-Codes  Outcome Measure Options: AM-PAC 6 Clicks Basic Mobility (PT)  AM-PAC 6 Clicks Score (PT): 16  AM-PAC 6 Clicks Score (OT): 16       Ana Cristina Gutierrez PTA  12/2/2022

## 2022-12-02 NOTE — PROGRESS NOTES
Pharmacy Consult-Vancomycin Dosing  Melchor Hardwick III is a  57 y.o. male receiving vancomycin therapy.     Indication: Bacteremia, bone/joint infection, endocarditis  Consulting Provider: Dr Euceda  ID Consult: Yes    Goal AUC: 400 - 600 mg/L*hr    Current Antimicrobial Therapy  Anti-Infectives (From admission, onward)      Ordered     Dose/Rate Route Frequency Start Stop    12/02/22 1159  ceftaroline (TEFLARO) 600 mg/100 mL 0.9% NS (mbp)        Ordering Provider: Kushal Balderrama, RPH    600 mg Intravenous Every 12 Hours Scheduled 12/02/22 1500 12/09/22 2059    12/02/22 1159  vancomycin 1250 mg/250 mL 0.9% NS IVPB ([facility])        Ordering Provider: Kushal Balderrama RPH    1,250 mg  over 90 Minutes Intravenous Every 12 Hours Scheduled 12/02/22 1245 12/09/22 0859    12/01/22 0933  vancomycin 1750 mg/500 mL 0.9% NS IVPB ([facility])        Ordering Provider: Bryce Euceda MD    20 mg/kg × 88.9 kg  over 120 Minutes Intravenous Once 12/01/22 1030 12/01/22 1310    12/01/22 0919  Pharmacy to dose vancomycin        Ordering Provider: Bryce Euceda MD     Does not apply Continuous PRN 12/01/22 0919 12/29/22 0918    11/30/22 1449  ceFAZolin in dextrose (ANCEF) IVPB solution 2 g        Ordering Provider: Brain Bentley MD    2 g  over 30 Minutes Intravenous Every 8 Hours 11/30/22 1800 12/01/22 0151    11/30/22 0815  ceFAZolin in dextrose (ANCEF) IVPB solution 2 g        Ordering Provider: Brain Bentley MD    2 g  over 30 Minutes Intravenous Once 11/30/22 0817 11/30/22 1045    11/30/22 0815  vancomycin 1250 mg/250 mL 0.9% NS IVPB ([facility])        Ordering Provider: Brain Bentley MD    15 mg/kg × 88.9 kg Intravenous Once 11/30/22 0817 11/30/22 0902            Allergies  Allergies as of 11/23/2022    (No Known Allergies)       Labs    Results from last 7 days   Lab Units 12/02/22  0431 12/01/22  1509 12/01/22  0824   BUN mg/dL 13 18 16   CREATININE mg/dL 0.63* 0.70* 0.71*       Results from last 7 days   Lab Units  "12/01/22  0824 11/28/22  1256 11/26/22  0139   WBC 10*3/mm3 9.88 5.90 4.20       Evaluation of Dosing     Last Dose Received in the ED/Outside Facility: 1250mg received as ppx on 11/30 @0902  Is Patient on Dialysis or Renal Replacement: no    Ht - 177.8 cm (70\")  Wt - 88.9 kg (196 lb)    Estimated Creatinine Clearance: 145.3 mL/min (A) (by C-G formula based on SCr of 0.63 mg/dL (L)).    Intake & Output (last 3 days)         11/29 0701  11/30 0700 11/30 0701  12/01 0700 12/01 0701 12/02 0700 12/02 0701 12/03 0700    P.O.  320 757 485    I.V. (mL/kg)  1400      Total Intake(mL/kg)  1720 757 (8.5) 485 (5.5)    Urine (mL/kg/hr)  1700 2025 (0.9) 850 (1.4)    Drains  30 55     Total Output  1730 2080 850    Net  -10 -1323 -365            Urine Unmeasured Occurrence   1 x             Microbiology and Radiology  Microbiology Results (last 10 days)       Procedure Component Value - Date/Time    Tissue / Bone Culture - Tissue, Knee, Right [[id number]] Collected: 11/30/22 1151    Lab Status: Preliminary result Specimen: Tissue from Knee, Right Updated: 12/02/22 0730     Tissue Culture No growth at 2 days     Gram Stain Few (2+) WBCs seen      No organisms seen    Tissue / Bone Culture - Tissue, Knee, Right [[id number]] Collected: 11/30/22 1149    Lab Status: Preliminary result Specimen: Tissue from Knee, Right Updated: 12/02/22 0839     Tissue Culture No growth at 2 days     Gram Stain Few (2+) WBCs seen      No organisms seen    AFB Culture - Tissue, Knee, Right [[id number]] Collected: 11/30/22 1149    Lab Status: Preliminary result Specimen: Tissue from Knee, Right Updated: 12/01/22 1222     AFB Stain No acid fast bacilli seen on concentrated smear    Tissue / Bone Culture - Synovium, Knee, Right [[id number]] Collected: 11/30/22 1140    Lab Status: Preliminary result Specimen: Synovium from Knee, Right Updated: 12/02/22 0730     Tissue Culture No growth at 2 days     Gram Stain Few (2+) WBCs seen      No organisms seen    AFB " Culture - Synovium, Knee, Right [[id number]] Collected: 11/30/22 1140    Lab Status: Preliminary result Specimen: Synovium from Knee, Right Updated: 12/01/22 1221     AFB Stain No acid fast bacilli seen on concentrated smear    MRSA Screen, PCR (Inpatient) - Swab, Nares [[id number]]  (Normal) Collected: 11/28/22 1256    Lab Status: Final result Specimen: Swab from Nares Updated: 11/28/22 1516     MRSA PCR Negative    Narrative:      The negative predictive value of this diagnostic test is high and should only be used to consider de-escalating anti-MRSA therapy. A positive result may indicate colonization with MRSA and must be correlated clinically.  MRSA Negative    Blood Culture - Blood, Arm, Right [[id number]]  (Normal) Collected: 11/23/22 1054    Lab Status: Final result Specimen: Blood from Arm, Right Updated: 11/28/22 1115     Blood Culture No growth at 5 days    Blood Culture - Blood, Hand, Right [[id number]]  (Normal) Collected: 11/23/22 1054    Lab Status: Final result Specimen: Blood from Hand, Right Updated: 11/28/22 1115     Blood Culture No growth at 5 days            Reported Vancomycin Levels                         [facility] AUC Calculation:    Current AUC:   -- mg/L*hr    Predicted Steady State AUC on Current Dose: -- mg/L*hr  _________________________________    Predicted Steady State AUC on New Dose:   -- mg/L*hr    Assessment/Plan:    Pharmacy to dose vancomycin for bacteremia. Goal AUC: 400-600 mg/L*hr.  Patient received loading dose of 1750mg on 12/1 at 1132.  Initiated maintenance dose of 1250mg every 12 hours. Predicted Steady State AUC at current dose: 478 mg/L*hr.  Assess clearance by obtaining vancomycin random level on 11/3 at 0600, prior to dose #5.  Patient is afebrile and voiding today, with more than 2000ml UOP documented. SCr is 0.63.  Pharmacy will continue to monitor cultures, sensitivities, renal function, and clinical status, and will adjust regimen as necessary.      Thank you for  this consult,  Kushal Balderrama, PharmD  Pharmacy Resident  12/2/2022  13:52 EST

## 2022-12-02 NOTE — PROGRESS NOTES
[facility]    Acute pain service Inpatient Progress Note    Patient Name: Melchor Hardwick III  :  1965  MRN:  [id number]        Acute Pain  Service Inpatient Progress Note:    Analgesia:Excellent  Pain Score:0/10  LOC: alert and awake  Resp Status: room air  Cardiac: VS stable  Side Effects:None  Catheter Site:clean, dressing intact and dry  Catheter type: peripheral nerve cath with Infusystem pump.  Volume: 1mL,8ml, 8ml InfuSystem Pump.  Catheter Plan:Catheter to remain Insitu and Continue catheter infusion rate unchanged  Comments:

## 2022-12-02 NOTE — PROGRESS NOTES
"IM progress note      Melchor Hardwick III  [id number]  1965     LOS: 3 days     Attending: Brain Bentley MD    Primary Care Provider: Missy Up APRN      Chief Complaint/Reason for visit:  No chief complaint on file.      Subjective        12/3/22:  Feels okay today.  Pain control is reasonable following removal of nerve block catheter to enable him to get MRI yesterday.  No nausea, vomiting, or shortness of breath.  MRI of lumbar spine and left foot have been done, not read yet.      Objective        Visit Vitals  /64 (BP Location: Left arm, Patient Position: Lying)   Pulse 84   Temp 98.1 °F (36.7 °C) (Oral)   Resp 18   Ht 177.8 cm (70\")   Wt 88.9 kg (196 lb)   SpO2 96%   BMI 28.12 kg/m²     Temp (24hrs), Av.2 °F (36.8 °C), Min:98.1 °F (36.7 °C), Max:98.2 °F (36.8 °C)      Intake/Output:    Intake/Output Summary (Last 24 hours) at 12/3/2022 1221  Last data filed at 12/3/2022 0900  Gross per 24 hour   Intake 765 ml   Output 1620 ml   Net -855 ml        Physical Therapy:     Plan of Care Reviewed With: patient  Progress: improving  Outcome Evaluation: Pt ambulated 40 feet with RW and min A for management of walker. He is progressing well with mobility overall.                Physical Exam:     General Appearance:    Alert, cooperative, in no acute distress   Head:    Normocephalic, without obvious abnormality, atraumatic    Lungs:     Normal effort, symmetric chest rise,  clear to      auscultation bilaterally              Heart:    Regular rhythm and normal rate, normal S1 and S2    Abdomen:     Normal bowel sounds, no masses, no organomegaly, soft        non-tender, non-distended, no guarding, no rebound                tenderness   Extremities:  Ace wrapped, knee immobilizer on right knee.    Prevena suction dressing.  Peripheral nerve block catheter is out  Left leg with long boot on, prior partial foot amputation history.      Pulses:   Pulses palpable and equal bilaterally   Skin:   No " bleeding, bruising or rash          Results Review:     I reviewed the patient's new clinical results.   Results from last 7 days   Lab Units 12/02/22  0431 12/01/22  0824 11/28/22  1256   WBC 10*3/mm3  --  9.88 5.90   HEMOGLOBIN g/dL 7.9* 10.2* 11.1*   HEMATOCRIT % 25.0* 31.6* 36.0*   PLATELETS 10*3/mm3  --  139* 144     Results from last 7 days   Lab Units 12/03/22  0503 12/02/22  0431 12/01/22  1509 12/01/22  0824 11/28/22  1256   SODIUM mmol/L 137 138 133*   < > 133*   POTASSIUM mmol/L 4.0 3.8 4.1   < > 3.9   CHLORIDE mmol/L 104 103 100   < > 97*   CO2 mmol/L 26.0 27.0 26.0   < > 23.0   BUN mg/dL 12 13 18   < > 11   CREATININE mg/dL 0.57* 0.63* 0.70*   < > 0.63*   CALCIUM mg/dL 7.9* 7.7* 7.9*   < > 8.5*   BILIRUBIN mg/dL  --   --   --   --  0.4   ALK PHOS U/L  --   --   --   --  294*   ALT (SGPT) U/L  --   --   --   --  27   AST (SGOT) U/L  --   --   --   --  46*   GLUCOSE mg/dL 115* 167* 233*   < > 286*    < > = values in this interval not displayed.   TYRESE   •  Indication for TYRESE -to rule out infective endocarditis in a patient with MRSA bacteremia.  •  Findings-  •  Left ventricular systolic function is normal. Estimated left ventricular EF = 60%  •  Left atrial appendage is well visualized and is free of thrombus.  •  Saline test was negative for the evidence of shunt in interatrial septum.  •  The tricuspid valve appeared normal in structure. There was a moderate sized echodense structure seen above  the posterior tricuspid leaflet but not attached to the leaflet. The appearance and location are not typical for regurgitation. This structure could be a normal variant. No evidence of tricuspid valve stenosis or significant regurgitations.  •  Other valves also appear normal in structure with no evidence of stenosis or significant regurgitations.  No vegetations seen on these valves.  •  There is no evidence of pericardial effusion.  •  Comment-  •  In view of presence of MRSA bacteremia, recommend to extend  antibiotic therapy for possible infective endocarditis and repeat TYRESE in 3 months.      Latest Reference Range & Units 12/02/22 20:30 12/03/22 05:03 12/03/22 07:56 12/03/22 11:31   Glucose 70 - 130 mg/dL 135 (H) 115 (H) 91 157 (H)   (H): Data is abnormally high      I reviewed the patient's new imaging including images and reports.    All medications reviewed.   acetaminophen, 1,000 mg, Oral, Q8H  aspirin, 81 mg, Oral, Q12H  ceftaroline, 600 mg, Intravenous, Q12H  insulin detemir, 20 Units, Subcutaneous, Q12H  insulin lispro, 0-7 Units, Subcutaneous, TID AC  Insulin Lispro, 7 Units, Subcutaneous, TID With Meals  levothyroxine, 25 mcg, Oral, Q AM  meloxicam, 15 mg, Oral, Daily  midodrine, 2.5 mg, Oral, TID AC  pantoprazole, 40 mg, Oral, Q AM  polyethylene glycol, 17 g, Oral, Daily  pregabalin, 150 mg, Oral, Q12H  sodium chloride, 10 mL, Intravenous, Q12H  vancomycin, 1,250 mg, Intravenous, Q12H      cyclobenzaprine, 5 mg, TID PRN  dextrose, 25 g, Q15 Min PRN  dextrose, 15 g, Q15 Min PRN  diphenhydrAMINE, 25 mg, Q6H PRN   Or  diphenhydrAMINE, 25 mg, Q6H PRN  glucagon (human recombinant), 1 mg, Q15 Min PRN  HYDROmorphone, 0.5 mg, Q2H PRN   And  naloxone, 0.1 mg, Q5 Min PRN  ketorolac, 15 mg, Q6H PRN  labetalol, 10 mg, Q4H PRN  ondansetron, 4 mg, Q6H PRN   Or  ondansetron, 4 mg, Q6H PRN  oxyCODONE, 10 mg, Q4H PRN  oxyCODONE, 5 mg, Q4H PRN  Pharmacy to dose vancomycin, , Continuous PRN  sodium chloride, 500 mL, TID PRN  sodium chloride, 10 mL, PRN  traMADol, 50 mg, Q8H PRN        Assessment & Plan       S/P right knee implant removal with insertion of antibiotic spacer    Type 2 diabetes mellitus with hyperglycemia, with long-term current use of insulin ([facility], [facility])    Infection of right knee (HCC)    Hypothyroid  MRSA sepsis  Chronic low back pain     Plan  1. PT/OT, weightbearing per protocol.  2. Pain control-prns   3. IS-encouraged  4. DVT proph-mechanicals, aspirin.  5. Bowel regimen  6. Monitor post-op labs  7. DC  planning       Hypothyroid  -Continue home Synthroid     DM  - hgb A1c on 11/28/2022 7.3  -Continue home bolus insulin with meals and long-acting insulin twice daily/ doses increased 12/1.   - Accu-Chek AC and HS with low dose SSI    TYRESE, done, noted, no obvious vegetation.    Follow-up on MRI results when available.    Antibiotics per ID, Dr. Euceda is following, I discussed with him.    Watch for adverse drug reactions.  Noted antibiotic regimen changed from daptomycin      Rose Diaz MD  12/03/22  12:21 EST

## 2022-12-02 NOTE — PROGRESS NOTES
[facility] Progress Note        Antibiotics:  Anti-Infectives (From admission, onward)    Ordered     Dose/Rate Route Frequency Start Stop    12/01/22 1550  vancomycin 1250 mg/250 mL 0.9% NS IVPB ([facility])        Ordering Provider: Kushal Balderrama RPH    1,250 mg  over 90 Minutes Intravenous Every 12 Hours 12/01/22 2100 12/31/22 2059    12/01/22 0933  vancomycin 1750 mg/500 mL 0.9% NS IVPB ([facility])        Ordering Provider: Byrce Euceda MD    20 mg/kg × 88.9 kg  over 120 Minutes Intravenous Once 12/01/22 1030 12/01/22 1310    12/01/22 0826  ceftaroline (TEFLARO) 600 mg/100 mL 0.9% NS ([facility])        Ordering Provider: Bryce Euceda MD    600 mg Intravenous Every 12 Hours 12/01/22 1000 12/15/22 0959    12/01/22 0919  Pharmacy to dose vancomycin        Ordering Provider: Bryce Euceda MD     Does not apply Continuous PRN 12/01/22 0919 12/29/22 0918    11/30/22 1449  ceFAZolin in dextrose (ANCEF) IVPB solution 2 g        Ordering Provider: Brain Bentley MD    2 g  over 30 Minutes Intravenous Every 8 Hours 11/30/22 1800 12/01/22 0151    11/30/22 0815  ceFAZolin in dextrose (ANCEF) IVPB solution 2 g        Ordering Provider: Brain Bentley MD    2 g  over 30 Minutes Intravenous Once 11/30/22 0817 11/30/22 1045    11/30/22 0815  vancomycin 1250 mg/250 mL 0.9% NS IVPB ([facility])        Ordering Provider: Brain Bentley MD    15 mg/kg × 88.9 kg Intravenous Once 11/30/22 0817 11/30/22 0902          CC: fatigue    HPI:    Patient is a 57 y.o.  Yr old male with history of cirrhosis/diabetes and other comorbidity as outlined below.  History of left foot gas gangrene with osteomyelitis and MRSA bacteremia treated in [place] by Dr. Giordano in May/June 2022.  Family reports left transmetatarsal amputation in approximately 8 weeks IV vancomycin.  Notes from [place] indicate transthoracic echocardiogram no vegetation per Dr. Giordano; he thinks he might of had several weeks of oral antibiotic after that but not entirely clear.  He  is admitted to [facility] September 20 with progressive right knee pain occurring over the past week preceding admission.  He thinks he might of twisted it but ended up with progressive redness/swelling and pain.  No specific blunt force or penetrating trauma.  he was evaluated by orthopedics and taken to the operating room for right knee incision/drainage and concern for septic arthritis per Dr. Bentley; blood cultures had  MRSA.  Had dysuria but urinalysis not consistent with UTI.  No hematuria or pyuria     9/23 with TV echodensity; 9/26/22  TYRESE no vegetation per cardiology; daptomycin maintained, transferred to [facility] with care taken over by Dr. Giordano; repeat blood cultures there on October 18 and October 20 and October 22 (dapto STU = 1)  with MRSA; blood cultures on October 24 and November 12 were negative per microbiology.  Repeat blood cultures November 20 with MRSA and daptomycin STU equal to 3, not susceptible per my discussion with microbiology; blood cultures positive again on November 22 but negative November 23. per Dr Giordano, teflaro had been added and concern regarding right knee with increased swelling/pain prompted transitioned back to [place] for further right knee surgery on November 30 by Dr Bentley     12/2/22 postop right knee with controlled pain,  dull at present,  Better controlled per nursing overall, nonradiating, worse with movement, better with pain meds and 2 out of 10 in severity at present; he denies any other focal musculoskeletal pain.  No new back pain but he does relate chronic lumbar pain, dull at present, worse with movement, not progressive and no new weakness or numbness. No myalgia     Left foot with no redness/swelling or pain.  Surgical site healed with no open wound or active drainage. Has a left arm midline     No headache photophobia or neck stiffness.  No nausea vomiting diarrhea or abdominal pain.  No shortness of breath cough or hemoptysis.  No other  "new skin rash.  No other recent procedures or interventions.    No indwelling pacemaker        ROS:      12/2/22 No f/c/s. No n/v/d. No rash. No new ADR to Abx.     Constitutional-- No Fever, chills or sweats.  Appetite diminished with fatigue.  Heent-- No new vision, hearing or throat complaints.  No epistaxis or oral sores.  Denies odynophagia or dysphagia.  No flashers, floaters or eye pain.   No headache, photophobia or neck stiffness.  CV-- No chest pain, palpitation or syncope  Resp-- No SOB/cough/Hemoptysis  GI- No nausea, vomiting, or diarrhea.  No hematochezia, melena, or hematemesis. Denies jaundice  -- No dysuria, hematuria, or flank pain.  Denies hesitancy, urgency or flank pain.  Lymph- no swollen lymph nodes in neck/axilla or groin.   Heme- No active bruising or bleeding; no Hx of DVT or PE.  MS-- no swelling or pain in the bones or joints of arms/legs.  No new back pain.  Neuro-- No acute focal weakness or numbness in the arms or legs.  No seizures.     Full 12 point review of systems reviewed and negative otherwise for acute complaints, except for above      PE:   /79 (BP Location: Right arm, Patient Position: Lying)   Pulse 81   Temp 98.3 °F (36.8 °C) (Oral)   Resp 16   Ht 177.8 cm (70\")   Wt 88.9 kg (196 lb)   SpO2 97%   BMI 28.12 kg/m²     GENERAL: Awake and alert, in no acute distress.  Chronically ill-appearing  HEENT: Normocephalic, atraumatic.  PERRL. EOMI. No conjunctival injection. No icterus. Oropharynx clear without evidence of thrush or exudate. No evidence of peridontal disease.    NECK: Supple without nuchal rigidity. No mass.  LYMPH: No cervical, axillary or inguinal lymphadenopathy.  HEART: RRR; soft murmur LSB, rubs, gallops.   LUNGS: Diminished at bases but otherwise clear to auscultation bilaterally without wheezing, rales, rhonchi. Normal respiratory effort. Nonlabored. No dullness.  ABDOMEN: Soft, nontender, nondistended. Positive bowel sounds. No rebound or " guarding. NO mass or HSM.  EXT:  No cyanosis, clubbing or edema. No cord.  : Genitalia generally unremarkable.  Without Ortiz catheter.  MSK: FROM without joint effusions noted arms/legs.    SKIN: Warm and dry without cutaneous eruptions on Inspection/palpation.    NEURO: Oriented to PPT. No focal deficits on motor/sensory exam at arms/legs.  PSYCHIATRIC: Normal insight and judgement. Cooperative with PE     Left foot with no redness induration or warmth.  No discrete mass bulge or fluctuance.  No crepitus or bulla.  Prior surgical site healed at [facility].  No open wound or active drainage     Right knee postop surgical site covered.  Exposed skin without obvious redness induration or warmth.  No discrete mass bulge or fluctuance.  No crepitus or bulla.     No peripheral stigmata/phenomena of endocarditis     IV without obvious redness or drainage    Laboratory Data    Results from last 7 days   Lab Units 12/02/22  0431 12/01/22  0824 11/28/22  1256 11/26/22  0139   WBC 10*3/mm3  --  9.88 5.90 4.20   HEMOGLOBIN g/dL 7.9* 10.2* 11.1* 8.7*   HEMATOCRIT % 25.0* 31.6* 36.0* 27.3*   PLATELETS 10*3/mm3  --  139* 144 111*     Results from last 7 days   Lab Units 12/02/22  0431   SODIUM mmol/L 138   POTASSIUM mmol/L 3.8   CHLORIDE mmol/L 103   CO2 mmol/L 27.0   BUN mg/dL 13   CREATININE mg/dL 0.63*   GLUCOSE mg/dL 167*   CALCIUM mg/dL 7.7*     Results from last 7 days   Lab Units 11/28/22  1256   ALK PHOS U/L 294*   BILIRUBIN mg/dL 0.4   ALT (SGPT) U/L 27   AST (SGOT) U/L 46*     Results from last 7 days   Lab Units 11/28/22  1256   SED RATE mm/hr 79*     Results from last 7 days   Lab Units 11/28/22  1256   CRP mg/dL 3.52*       Estimated Creatinine Clearance: 145.3 mL/min (A) (by C-G formula based on SCr of 0.63 mg/dL (L)).      Microbiology:      Radiology:  Imaging Results (Last 72 Hours)     Procedure Component Value Units Date/Time    XR Knee 1 or 2 View Right [[id number]] Collected: 11/30/22 1542     Updated: 11/30/22  "1545    Narrative:      DATE OF EXAM: 11/30/2022 3:20 PM     PROCEDURE: XR KNEE 1 OR 2 VW RIGHT-     INDICATIONS: Post-Op Knee Arthoplasty; M00.9-Pyogenic arthritis,  unspecified     COMPARISON: 9/20/2022     TECHNIQUE: One to two radiologic views of the right knee were obtained.     FINDINGS:  Postsurgical findings of interval right total knee arthroplasty without  evidence of immediate hardware complication. Expected soft tissue edema  and subcutaneous emphysema.        Impression:      Postsurgical findings of interval right total knee arthroplasty without  evidence of immediate hardware complication. Expected soft tissue edema  and subcutaneous emphysema.      This report was finalized on 11/30/2022 3:42 PM by Garry Gonzalez.               Impression:       --Acute/recurrent/persistent MRSA bacteremia/septicemia; prior history positive cultures May 2022 and recurrent despite prior  IV vancomycin and left foot surgery in addition to other supportive measures.   TTE with ? TV echodensity prior admit in September and TYRESE no vegetation at that time at [facility];   blood culture positivity again at [facility] in October/November as above.  Daptomycin STU increased to 3 on 11/20 isolate and not sensitive to daptomycin per microbiology.  Discussed on several occasions with Dr. Giordano; had a repeat TYRESE on October 31 with mention of \" moderate size echodense structure above the posterior tricuspid leaflet but not attached to the leaflet\" per cardiology there, described potentially as a \"normal variant\" per their note.  High risk for further serious morbidity and other serious sequela including persistent/progressive or recurrent infection, metastatic foci of involvement and other dire consequences;  vancomycin sensitivity maintain albeit with STU =2; adjusted to vancomycin/ceftaroline in light of daptomycin resistance and I asked microbiology for additional sensitivity data as could require additional " adjustments     **Blood cultures November 23 negative so far     --Acute right knee pain/septic arthritis with surgical intervention, incision/debridement by Dr. Bentley on September 21.   MRSA+ in the setting of MRSA bacteremia at that time;  Repeat surgery 11/30, d/w Dr Bentley.     --History of cirrhosis by records.  Unclear etiology.   further GI referral/eval per  medicine team     --Diabetes.  You need to tightly control blood sugar to give best chance for healing.;  Described as uncontrolled by medicine team at admission     -- Chronic lumbar back pain for decades.  This is not new, not worse and no new location.  No new exacerbation or new neurologic symptomatology in his extremities.  Nonetheless, may require further imaging depending on his clinical course to help exclude any other sequestered/spinal-paraspinal focus     --TCP ; monitor     PLAN:       -- IV vancomycin/ceftaroline     -- Check/review labs cultures and scans     -- Partial history per nursing staff     --d/w pharmacy       -- Highly complex set of issues with high risk for further serious morbidity and other serious sequela     -- Discussed with microbiology; they are doing further assessment to blood cultures drawn November 22     --d/w Dr Giordano and Dr Bentley and Dr Diaz     --nursing to remove midline (in for at least several weeks per him and likely needs Central catheter for long term IV abx ); patient prefers picc line          Bryce Euceda MD  12/2/2022

## 2022-12-02 NOTE — PROGRESS NOTES
Orthopedic Progress Note      Patient: Melchor Hardwick III  YOB: 1965     Date of Admission: 11/30/2022  7:25 AM Medical Record Number: [id number]     Attending Physician: Brain Bentley MD    Status Post:  Procedure(s):  ANTIBIOTIC SPACER PLACEMENT KNEE - RIGHT Post Operative Day Number: 2    Subjective : No new orthopaedic complaints     Pain Relief: some relief with present medication.     Systemic Complaints: No Complaints  Vitals:    12/02/22 0021 12/02/22 0440 12/02/22 0622 12/02/22 0734   BP: 128/63 140/66 140/66 142/79   BP Location: Right arm Right arm  Right arm   Patient Position: Lying Lying  Lying   Pulse: 70 78 79 81   Resp: 16 16  16   Temp: 98 °F (36.7 °C) 98 °F (36.7 °C)  98.3 °F (36.8 °C)   TempSrc: Oral Oral  Oral   SpO2: 97% 97%  97%   Weight:       Height:           Physical Exam: 57 y.o. male    General Appearance:       Alert, cooperative, in no acute distress                  Extremities:    Dressing Clean, Dry and Intact             No clinical sign of DVT        Able to do good movements of digits    Pulses:   Pulses palpable and equal bilaterally           Diagnostic Tests:     Results from last 7 days   Lab Units 12/02/22  0431 12/01/22  0824 11/28/22  1256 11/26/22  0139   WBC 10*3/mm3  --  9.88 5.90 4.20   HEMOGLOBIN g/dL 7.9* 10.2* 11.1* 8.7*   HEMATOCRIT % 25.0* 31.6* 36.0* 27.3*   PLATELETS 10*3/mm3  --  139* 144 111*     Results from last 7 days   Lab Units 12/02/22  0431 12/01/22  1509 12/01/22  0824   SODIUM mmol/L 138 133* 132*   POTASSIUM mmol/L 3.8 4.1 4.6   CHLORIDE mmol/L 103 100 98   CO2 mmol/L 27.0 26.0 23.0   BUN mg/dL 13 18 16   CREATININE mg/dL 0.63* 0.70* 0.71*   GLUCOSE mg/dL 167* 233* 275*   CALCIUM mg/dL 7.7* 7.9* 8.5*     Results from last 7 days   Lab Units 11/28/22  1256   INR  1.12   APTT seconds 34.6     No results found for: URICACID  Lab Results   Component Value Date    CRYSTAL No crystals seen 09/21/2022     Microbiology Results (last 10  days)     Procedure Component Value - Date/Time    Tissue / Bone Culture - Tissue, Knee, Right [[id number]] Collected: 11/30/22 1151    Lab Status: Preliminary result Specimen: Tissue from Knee, Right Updated: 12/02/22 0730     Tissue Culture No growth at 2 days     Gram Stain Few (2+) WBCs seen      No organisms seen    Tissue / Bone Culture - Tissue, Knee, Right [[id number]] Collected: 11/30/22 1149    Lab Status: Preliminary result Specimen: Tissue from Knee, Right Updated: 12/02/22 0839     Tissue Culture No growth at 2 days     Gram Stain Few (2+) WBCs seen      No organisms seen    AFB Culture - Tissue, Knee, Right [[id number]] Collected: 11/30/22 1149    Lab Status: Preliminary result Specimen: Tissue from Knee, Right Updated: 12/01/22 1222     AFB Stain No acid fast bacilli seen on concentrated smear    Tissue / Bone Culture - Synovium, Knee, Right [[id number]] Collected: 11/30/22 1140    Lab Status: Preliminary result Specimen: Synovium from Knee, Right Updated: 12/02/22 0730     Tissue Culture No growth at 2 days     Gram Stain Few (2+) WBCs seen      No organisms seen    AFB Culture - Synovium, Knee, Right [[id number]] Collected: 11/30/22 1140    Lab Status: Preliminary result Specimen: Synovium from Knee, Right Updated: 12/01/22 1221     AFB Stain No acid fast bacilli seen on concentrated smear    MRSA Screen, PCR (Inpatient) - Swab, Nares [[id number]]  (Normal) Collected: 11/28/22 1256    Lab Status: Final result Specimen: Swab from Nares Updated: 11/28/22 1516     MRSA PCR Negative    Narrative:      The negative predictive value of this diagnostic test is high and should only be used to consider de-escalating anti-MRSA therapy. A positive result may indicate colonization with MRSA and must be correlated clinically.  MRSA Negative    Blood Culture - Blood, Arm, Right [[id number]]  (Normal) Collected: 11/23/22 1054    Lab Status: Final result Specimen: Blood from Arm, Right Updated: 11/28/22 1115     Blood  Culture No growth at 5 days    Blood Culture - Blood, Hand, Right [[id number]]  (Normal) Collected: 11/23/22 1054    Lab Status: Final result Specimen: Blood from Hand, Right Updated: 11/28/22 1115     Blood Culture No growth at 5 days        XR Knee 1 or 2 View Right    Result Date: 11/30/2022  Postsurgical findings of interval right total knee arthroplasty without evidence of immediate hardware complication. Expected soft tissue edema and subcutaneous emphysema.  This report was finalized on 11/30/2022 3:42 PM by Garry Gonzalez.          Current Medications:  Scheduled Meds:[START ON 12/3/2022] Pharmacy Consult, , Does not apply, Once  acetaminophen, 1,000 mg, Oral, Q8H  aspirin, 81 mg, Oral, Q12H  ceftaroline, 600 mg, Intravenous, Q12H  insulin detemir, 20 Units, Subcutaneous, Q12H  insulin lispro, 0-7 Units, Subcutaneous, TID AC  Insulin Lispro, 7 Units, Subcutaneous, TID With Meals  levothyroxine, 25 mcg, Oral, Q AM  meloxicam, 15 mg, Oral, Daily  midodrine, 2.5 mg, Oral, TID AC  pantoprazole, 40 mg, Oral, Q AM  polyethylene glycol, 17 g, Oral, Daily  pregabalin, 150 mg, Oral, Q12H  vancomycin, 1,250 mg, Intravenous, Q12H      Continuous Infusions:lactated ringers, 9 mL/hr, Last Rate: Stopped (11/30/22 1306)  lactated ringers, 100 mL/hr, Last Rate: 100 mL/hr (12/01/22 0540)  Pharmacy to dose vancomycin,   ropivacaine,       PRN Meds:.•  cyclobenzaprine  •  dextrose  •  dextrose  •  diphenhydrAMINE **OR** diphenhydrAMINE  •  glucagon (human recombinant)  •  HYDROmorphone **AND** naloxone  •  ketorolac  •  labetalol  •  ondansetron **OR** ondansetron  •  oxyCODONE  •  oxyCODONE  •  Pharmacy to dose vancomycin  •  sodium chloride  •  traMADol    Assessment: Status post  ANTIBIOTIC SPACER PLACEMENT KNEE - RIGHT    Patient Active Problem List   Diagnosis   • Hyperlipidemia   • Hypertensive disorder   • Obesity   • Type 2 diabetes mellitus with hyperglycemia, with long-term current use of insulin (HCC)   • Gas  gangrene of foot (HCC)   • Cellulitis in diabetic foot (HCC)   • Other acute osteomyelitis of left foot (HCC)   • Osteomyelitis (HCC)   • Critical illness myopathy   • Staphylococcal arthritis of right knee (HCC)   • Other cirrhosis of liver (HCC)   • MRSA bacteremia   • Endocarditis of tricuspid valve   • Wound of right buttock   • History of osteomyelitis   • Debility   • Infection of right knee (HCC)   • S/P right knee implant removal with insertion of antibiotic spacer   • Hypothyroid       PLAN:   Continues current post-op course  Anticoagulation: Aspirin started  Mobilize with PT as tolerated per protocol  abx per ID  W/u for endo vascular source of persistent bacteriemia (? Spine source)  Drain out today   Incisional wound vac x 1 week     Weight Bearing: WBAT  Discharge Plan: OK to plan for discharge pending ID work up     Brain Bentley MD    Date: 12/2/2022    Time: 09:14 EST

## 2022-12-02 NOTE — CASE MANAGEMENT/SOCIAL WORK
Continued Stay Note  [facility]     Patient Name: Melchor Hardwick III  MRN: [id number]  Today's Date: 12/2/2022    Admit Date: 11/30/2022    Plan: Ongoing   Discharge Plan     Row Name 12/02/22 1514       Plan    Plan Ongoing    Patient/Family in Agreement with Plan yes    Plan Comments Mr. Hardwick would like a referral to [facility]. I will send this referral to them once he is closer to discharge. Continue to await a final antibiotic plan. PICC placed today. Case management will continue to follow.    Final Discharge Disposition Code 30 - still a patient               Discharge Codes    No documentation.               Expected Discharge Date and Time     Expected Discharge Date Expected Discharge Time    Dec 3, 2022             Gavin Collazo RN

## 2022-12-02 NOTE — PLAN OF CARE
Goal Outcome Evaluation:  Plan of Care Reviewed With: patient        Progress: no change  Outcome Evaluation: Patient took 8 steps from recliner to bed with Min assist x1, tech assist with IV pole and rolling walker for support. Verbal cues for step sequencing and walker placement. Gait distance limited by orthostatic hypotension and c/o dizziness. Nursing notified and aware. B LE isometrics completed. Nursing notified and aware of BP readings.

## 2022-12-02 NOTE — THERAPY EVALUATION
Patient Name: Melchor Hardwick III  : 1965    MRN: [id number]                              Today's Date: 2022       Admit Date: 2022    Visit Dx:     ICD-10-CM ICD-9-CM   1. Infection of right knee (HCC)  M00.9 686.9     Patient Active Problem List   Diagnosis   • Hyperlipidemia   • Hypertensive disorder   • Obesity   • Type 2 diabetes mellitus with hyperglycemia, with long-term current use of insulin (HCC)   • Gas gangrene of foot (HCC)   • Cellulitis in diabetic foot (HCC)   • Other acute osteomyelitis of left foot (HCC)   • Osteomyelitis (HCC)   • Critical illness myopathy   • Staphylococcal arthritis of right knee (HCC)   • Other cirrhosis of liver (HCC)   • MRSA bacteremia   • Endocarditis of tricuspid valve   • Wound of right buttock   • History of osteomyelitis   • Debility   • Infection of right knee (HCC)   • S/P right knee implant removal with insertion of antibiotic spacer   • Hypothyroid     Past Medical History:   Diagnosis Date   • Diabetes mellitus (HCC)     4 times per day gets checked for sugar at rehab   • Dizzy    • Hyperlipidemia    • Hypertension    • Hypothyroid 2022   • MRSA infection     blood -      • Orthostatic hypotension    • Pressure sore on buttocks     top crack -  sees wound - but now rn take care of it at rehab - minor opening - dime size - pat nurse didnt assess-  pt states getting better   • Pyogenic arthritis of right knee joint, due to unspecified organism (HCC) 2022   • Sepsis (HCC)    • Wears glasses     readers     Past Surgical History:   Procedure Laterality Date   • ABSCESS DRAINAGE  2004    PERINEUM   • AMPUTATION FOOT Left 2022    Procedure: AMPUTATION FOOT;  Surgeon: Addison Colby MD;  Location: [facility];  Service: Podiatry;  Laterality: Left;   • INCISION AND DRAINAGE LEG Left 05/10/2022    Procedure: INCISION AND DRAINAGE LOWER EXTREMITY;  Surgeon: Addison Colby MD;  Location: [facility] OR;  Service: Podiatry;  Laterality:  Left;   • KNEE INCISION AND DRAINAGE Right 09/21/2022    Procedure: KNEE INCISION AND DRAINAGE RIGHT;  Surgeon: Brain Bentley MD;  Location:  [facility] OR;  Service: Orthopedics;  Laterality: Right;   • PERIPHERALLY INSERTED CENTRAL CATHETER INSERTION Left    • TOTAL KNEE  PROSTHESIS REMOVAL W/ SPACER INSERTION Right 11/30/2022    Procedure: ANTIBIOTIC SPACER PLACEMENT KNEE - RIGHT;  Surgeon: Brain Bentley MD;  Location:  [facility] OR;  Service: Orthopedics;  Laterality: Right;   • WOUND DEBRIDEMENT Left 05/13/2022    Procedure: DEBRIDEMENT FOOT;  Surgeon: Addison Colby MD;  Location:  [facility] OR;  Service: Podiatry;  Laterality: Left;      General Information     Row Name 12/02/22 0842          OT Time and Intention    Document Type evaluation  -JY     Mode of Treatment occupational therapy;individual therapy  -JY     Row Name 12/02/22 0842          General Information    Patient Profile Reviewed yes  -JY     Prior Level of Function min assist:;all household mobility;gait;transfer;bed mobility;dressing;bathing;dependent:;home management;cooking;cleaning;independent:;feeding;grooming  pt using FWW recently while at rehab PTA, req'd staff A at rehab for bathing, dressing and toileting, limited by decreased ROM at R knee and pain  -JY     Existing Precautions/Restrictions fall;brace on at all times;other (see comments);orthostatic hypotension  WBAT RLE, hemovac drain, wound vac, adductor canal nerve cath, KI on RLE AAT, L walking boot for comfort w/ hx L transmet amputations, hx orthostatic hypotension- usually wears abdominal binder  -JY     Barriers to Rehab medically complex;previous functional deficit  -JY     Row Name 12/02/22 0842          Occupational Profile    Environmental Supports and Barriers (Occupational Profile) recently at rehab PTA however when at home with step over tub/shower combo w/ seat, standard toilet w/ access to BSC, DME: has FWW  -JY     Row Name 12/02/22 0842          Living Environment     People in Home sibling(s);other (see comments)  recently at rehab however prior to rehab pt living with sibling  -JY     Row Name 12/02/22 0842          Home Main Entrance    Number of Stairs, Main Entrance none;other (see comments)  ramp  -JY     Stair Railings, Main Entrance none  -JY     Row Name 12/02/22 0842          Stairs Within Home, Primary    Stairs, Within Home, Primary 0  -JY     Number of Stairs, Within Home, Primary none  -JY     Stair Railings, Within Home, Primary none  -JY     Row Name 12/02/22 0842          Cognition    Orientation Status (Cognition) oriented x 4  -JY     Row Name 12/02/22 0842          Safety Issues, Functional Mobility    Safety Issues Affecting Function (Mobility) insight into deficits/self-awareness;positioning of assistive device;problem-solving;safety precaution awareness;safety precautions follow-through/compliance;sequencing abilities  -JY     Impairments Affecting Function (Mobility) balance;endurance/activity tolerance;pain;range of motion (ROM);postural/trunk control;strength  -JY     Comment, Safety Issues/Impairments (Mobility) pt alert and able to follow commands, cognizant of need for A given impairments and mgmt of lines, tubes, devices; ptesents with orthostatic hypotension however denied s/s during positional changes  -JY           User Key  (r) = Recorded By, (t) = Taken By, (c) = Cosigned By    Initials Name Provider Type    Jaja Morales OT Occupational Therapist                 Mobility/ADL's     Row Name 12/02/22 0851          Bed Mobility    Bed Mobility supine-sit;scooting/bridging  -JY     Scooting/Bridging [place] (Bed Mobility) contact guard;verbal cues  -JY     Supine-Sit [place] (Bed Mobility) contact guard;verbal cues  -JY     Assistive Device (Bed Mobility) bed rails;head of bed elevated  -JY     Comment, (Bed Mobility) increased time and effort to advance LEs with KI (RLE) and walking boot (LLE) donned prior to bed mobility, cues for  safe mgmt of wound vac, nerve cath etc during mobility and generally advancing LEs to EOB and uprighting trunk into sitting, utilized bed rails and HOB elevated, denied any s/s of dizziness or feeling LH at EOB, BP did decrease to 85/68 compared to 110/83 at supine, RN aware  -ALTHEA     Row Name 12/02/22 0851          Transfers    Transfers sit-stand transfer;stand-sit transfer;bed-chair transfer  -CHERRYY     Comment, (Transfers) skilled cues for optimal hand placement for controlled ascend, descend specifically reaching back prior to sitting and moving RLE out forward with KI for comfort and control and to push up from seated surface in standing; BP still lower upon standing at 84/53, pt denied s/s; increased time to advance LEs in pivot to recliner with report of increased pain (5/10) at anterior R knee with weight bearing and mobility  -ALTHEA     Row Name 12/02/22 0851          Bed-Chair Transfer    Bed-Chair [place] (Transfers) minimum assist (75% patient effort);verbal cues  -CHERRYY     Assistive Device (Bed-Chair Transfers) walker, front-wheeled  -ALTHEA     Row Name 12/02/22 0851          Sit-Stand Transfer    Sit-Stand [place] (Transfers) minimum assist (75% patient effort);1 person assist;2 person assist;verbal cues  increased A present d/t decreased BP and increased pain at R knee w/ mobility for safety  -ALTHEA     Assistive Device (Sit-Stand Transfers) walker, front-wheeled  -ALTHEA     Row Name 12/02/22 0851          Stand-Sit Transfer    Stand-Sit [place] (Transfers) minimum assist (75% patient effort);verbal cues  -CHERRYY     Assistive Device (Stand-Sit Transfers) wheelchair;walker, front-wheeled  -ALTHEA     Row Name 12/02/22 0851          Toilet Transfer    Type (Toilet Transfer) --  educated pt on plan for assisted t/fs to BSC or toilet as mobility progresses vs use of bedside options for graded toileting, pt denied need to complete this date  -ALTHEA     Row Name 12/02/22 0851          Functional Mobility    Functional  Mobility- Ind. Level not tested  -JY     Functional Mobility- Comment defer to PT for specifics regarding fxl mobility  -JY     Row Name 12/02/22 0851          Activities of Daily Living    BADL Assessment/Intervention upper body dressing;lower body dressing;grooming;bathing  -[place]     Row Name 12/02/22 0851          Mobility    Extremity Weight-bearing Status right lower extremity;left lower extremity  -JY     Left Lower Extremity (Weight-bearing Status) weight-bearing as tolerated (WBAT);other (see comments)  w/ walking boot with air bladder  -JY     Right Lower Extremity (Weight-bearing Status) weight-bearing as tolerated (WBAT);other (see comments)  in knee immobilizer AAT for 2 weeks  -[place]     Row Name 12/02/22 0851          Upper Body Dressing Assessment/Training    [place] Level (Upper Body Dressing) doff;don;pajama/robe;minimum assist (75% patient effort);verbal cues  -JY     Position (Upper Body Dressing) unsupported sitting;supported sitting  -JY     Comment, (Upper Body Dressing) min A for proximal and posterior mgmt of gown  -[place]     Row Name 12/02/22 0851          Lower Body Dressing Assessment/Training    [place] Level (Lower Body Dressing) doff;don;socks;maximum assist (25% patient effort);dependent (less than 25% patient effort);other (see comments)  KI and L walking boot - dependent A  -JY     Assistive Devices (Lower Body Dressing) reacher;sock-aid;other (see comments)  pt is requiring use of AE for improved distal reach toward LEs given decreased ROM and donning of KI AAT at RLE, reviewed w/ pt optimal grasp, tech, use of AE; pt has other AE from prior sx  -JY     Position (Lower Body Dressing) supported sitting  -JY     Comment, (Lower Body Dressing) educated pt on optimal position, tech, seq for threading and unthreading LB garments given more impacted RLE and use of AE to improve distal reach and safety, pt u/able to reach RLE without AE d/t KI, difficulty reaching LLE; recommended  assistance with donning KI for proper alignment and safety; will need further AE training during more authentic total body instances  -J     Row Name 12/02/22 0851          Grooming Assessment/Training    [place] Level (Grooming) wash face, hands;set up  -     Position (Grooming) supported sitting  -[facility] Name 12/02/22 0851          Bathing Assessment/Intervention    [place] Level (Bathing) lower body;distal lower extremities/feet  -     Comment, (Bathing) educated pt on no showering while nerve cath and wound vac in place, will f/u with bathing instruction s/p removal of nerve cath, wound vac and hemovac within d/c education; pt has LH sponge for improved distal reach  -           User Key  (r) = Recorded By, (t) = Taken By, (c) = Cosigned By    Initials Name Provider Type    Jaja Morales OT Occupational Therapist               Obj/Interventions     [facility] Name 12/02/22 0917          Sensory Assessment (Somatosensory)    Sensory Assessment (Somatosensory) bilateral UE;sensation intact  -     Bilateral UE Sensory Assessment general sensation;light touch awareness;light touch localization;intact  -     Sensory Assessment denies any numbness or tingling and able to recognize all LT stimuli as intact and symmetrical  -[facility] Name 12/02/22 0917          Vision Assessment/Intervention    Visual Impairment/Limitations corrective lenses for reading  -     Vision Assessment Comment no acute changes to vision  -[facility] Name 12/02/22 0917          Range of Motion Comprehensive    General Range of Motion bilateral upper extremity ROM WFL  -[facility] Name 12/02/22 0917          Strength Comprehensive (MMT)    General Manual Muscle Testing (MMT) Assessment upper extremity strength deficits identified  -     Comment, General Manual Muscle Testing (MMT) Assessment BUE MMS grossly 4+/5 per MMT  -     Row Name 12/02/22 0917          Motor Skills    Motor Skills coordination;functional  endurance  -JY     Coordination finger to nose;other (see comments);bilateral;upper extremity;WFL  thumb digit opposition  -JY     Functional Endurance decreased activity tolerance toward more dynamic tasks  -JY     Row Name 12/02/22 0917          Balance    Balance Assessment sitting static balance;sitting dynamic balance;standing static balance;standing dynamic balance  -JY     Static Sitting Balance supervision  -JY     Dynamic Sitting Balance standby assist;other (see comments)  pre t/f skills at EOB  -JY     Position, Sitting Balance unsupported;sitting edge of bed  -JY     Static Standing Balance minimal assist;verbal cues  -JY     Dynamic Standing Balance minimal assist;verbal cues  -JY     Position/Device Used, Standing Balance supported;walker, front-wheeled  -JY     Balance Interventions sitting;standing;static;dynamic;sit to stand;supported;occupation based/functional task  -JY     Comment, Balance no overt LOB during seated or standing tasks, utilized FWW throughout standing; increased time and cues for seq of feet and weight shifting to advance  -JY           User Key  (r) = Recorded By, (t) = Taken By, (c) = Cosigned By    Initials Name Provider Type    Jaja Morales OT Occupational Therapist               Goals/Plan     Row Name 12/02/22 0932          Transfer Goal 1 (OT)    Activity/Assistive Device (Transfer Goal 1, OT) sit-to-stand/stand-to-sit;bed-to-chair/chair-to-bed;commode, bedside without drop arms;walker, rolling;other (see comments)  progress to toilet as able  -JY     [place] Level/Cues Needed (Transfer Goal 1, OT) contact guard required;verbal cues required  -JY     Time Frame (Transfer Goal 1, OT) long term goal (LTG);by discharge  -JY     Progress/Outcome (Transfer Goal 1, OT) new goal  -JY     Row Name 12/02/22 0932          Dressing Goal 1 (OT)    Activity/Device (Dressing Goal 1, OT) lower body dressing;other (see comments)  d/d LB garments with AE, receive A for [place]      [place]/Cues Needed (Dressing Goal 1, OT) moderate assist (50-74% patient effort);verbal cues required  -JY     Time Frame (Dressing Goal 1, OT) long term goal (LTG);by discharge  -JY     Progress/Outcome (Dressing Goal 1, OT) new goal  -JY     Row Name 12/02/22 0932          Toileting Goal 1 (OT)    Activity/Device (Toileting Goal 1, OT) adjust/manage clothing;perform perineal hygiene;commode, bedside without drop arms;grab bar/safety frame;raised toilet seat  -JY     [place] Level/Cues Needed (Toileting Goal 1, OT) moderate assist (50-74% patient effort);verbal cues required  -JY     Time Frame (Toileting Goal 1, OT) long term goal (LTG);by discharge  -JY     Progress/Outcome (Toileting Goal 1, OT) new goal  -JY     Row Name 12/02/22 0932          Strength Goal 1 (OT)    Strength Goal 1 (OT) Pt to complete seated TE program focused on BUE strength and endurance with progressive sets/reps/resistance in order to improve integration in ADLs, related t/fs.  -JY     Time Frame (Strength Goal 1, OT) long term goal (LTG);by discharge  -JY     Progress/Outcome (Strength Goal 1, OT) new goal  -     Row Name 12/02/22 0932          Therapy Assessment/Plan (OT)    Planned Therapy Interventions (OT) activity tolerance training;adaptive equipment training;BADL retraining;functional balance retraining;occupation/activity based interventions;patient/caregiver education/training;ROM/therapeutic exercise;strengthening exercise;transfer/mobility retraining  -JY           User Key  (r) = Recorded By, (t) = Taken By, (c) = Cosigned By    Initials Name Provider Type    Jaja Morales, PAYAM Occupational Therapist               Clinical Impression     Row Name 12/02/22 0921          Pain Assessment    Pretreatment Pain Rating 0/10 - no pain  -JY     Posttreatment Pain Rating 1/10  -JY     Pain Location - Side/Orientation Left  -JY     Pain Location anterior  -JY     Pain Location - knee  -JY     Pre/Posttreatment Pain  Comment pt reported increased pain at R anterior knee during mobility and weight shifting through RLE, decreased upon rest and return to sitting  -JY     Pain Intervention(s) Repositioned;Ambulation/increased activity  pt deferred ice pack, nerve cath intact upon OT leaving  -JY     Row Name 12/02/22 0921          Plan of Care Review    Plan of Care Reviewed With patient  -JY     Progress no change  OT IE  -JY     Outcome Evaluation OT evaluation completed. Pt presents with decreased I in ADLs, related t/fs, mobility compared to PLOF limited by decreased functional endurance toward more dynamic tasks, mild muscle weakness at BUEs, impaired balance, decreased distal reach toward LEs in part limited by KI donned AAT. OT issued LH reacher and sock aid to assist with LBD and improved performance anticipated as pt dep for d/d socks and LB garments at R side w/o use. Pt able to verbalize correct use of AE with some prior use with previous sxs. Reviewed with pt optimal position, seq, tech for LBD, no showering while nerve cath and wound vac, drain in place. Req'd min A for d/d gown, SUA for hand/face washing and oral care. Gross min A in STS t/fs and min A x 1-2 for SPT to recliner with FWW with increased time for seq with report of increased pain with weight bearing. Pt presented with decreased BP in sequential positions however denied s/s. RN aware, improved in return to sitting. Recommend IPOT POC and IRF at d/c based on underlying impairments and impact on fxn.  -JY     Row Name 12/02/22 0921          Therapy Assessment/Plan (OT)    Patient/Family Therapy Goal Statement (OT) to maximize I in ADLs, related t/fs, return to PLOF  -JY     Rehab Potential (OT) good, to achieve stated therapy goals  -JY     Criteria for Skilled Therapeutic Interventions Met (OT) yes;skilled treatment is necessary  -JY     Therapy Frequency (OT) daily  -JY     Row Name 12/02/22 0921          Therapy Plan Review/Discharge Plan (OT)     Equipment Needs Upon Discharge (OT) dressing equipment  -JY     Anticipated Discharge Disposition (OT) inpatient rehabilitation facility  -JY     Row Name 12/02/22 0921          Vital Signs    Pre Systolic BP Rehab 110  supine  -JY     Pre Treatment Diastolic BP 83  -JY     Intra Systolic BP Rehab 85   85/68 sitting EOB, 84/53 in standing  -JY     Intra Treatment Diastolic BP 68  -JY     Post Systolic BP Rehab 112  returned to sitting in recliner  -JY     Post Treatment Diastolic BP 65  -JY     Pretreatment Heart Rate (beats/min) 91  -JY     Posttreatment Heart Rate (beats/min) 93  -JY     Pre SpO2 (%) 98  -JY     O2 Delivery Pre Treatment room air  -JY     O2 Delivery Intra Treatment room air  -JY     Post SpO2 (%) 98  -JY     O2 Delivery Post Treatment room air  -JY     Pre Patient Position Supine  -JY     Intra Patient Position Standing  -JY     Post Patient Position Sitting  -JY     Row Name 12/02/22 0921          Positioning and Restraints    Pre-Treatment Position in bed  -JY     Post Treatment Position chair  -JY     In Chair notified nsg;reclined;call light within reach;encouraged to call for assist;exit alarm on;waffle cushion;legs elevated;R knee immobilizer  pt ask for L walking boot to remain donned in recliner vs doffing  -JY           User Key  (r) = Recorded By, (t) = Taken By, (c) = Cosigned By    Initials Name Provider Type    Jaja Morales, PAYAM Occupational Therapist               Outcome Measures     Row Name 12/02/22 0934          How much help from another is currently needed...    Putting on and taking off regular lower body clothing? 2  -JY     Bathing (including washing, rinsing, and drying) 2  -JY     Toileting (which includes using toilet bed pan or urinal) 2  -JY     Putting on and taking off regular upper body clothing 3  -JY     Taking care of personal grooming (such as brushing teeth) 3  -JY     Eating meals 4  -JY     AM-PAC 6 Clicks Score (OT) 16  -JY     Row Name 12/02/22 0934           Functional Assessment    Outcome Measure Options AM-PAC 6 Clicks Daily Activity (OT)  -ALTHEA           User Key  (r) = Recorded By, (t) = Taken By, (c) = Cosigned By    Initials Name Provider Type    Jaja Morales OT Occupational Therapist                Occupational Therapy Education     Title: PT OT SLP Therapies (In Progress)     Topic: Occupational Therapy (In Progress)     Point: ADL training (In Progress)     Description:   Instruct learner(s) on proper safety adaptation and remediation techniques during self care or transfers.   Instruct in proper use of assistive devices.              Learning Progress Summary           Patient Acceptance, E,D, NR by ALTHEA at 12/2/2022 0751                   Point: Home exercise program (Not Started)     Description:   Instruct learner(s) on appropriate technique for monitoring, assisting and/or progressing therapeutic exercises/activities.              Learner Progress:  Not documented in this visit.          Point: Precautions (In Progress)     Description:   Instruct learner(s) on prescribed precautions during self-care and functional transfers.              Learning Progress Summary           Patient Acceptance, E,D, NR by ALTHEA at 12/2/2022 0751                   Point: Body mechanics (In Progress)     Description:   Instruct learner(s) on proper positioning and spine alignment during self-care, functional mobility activities and/or exercises.              Learning Progress Summary           Patient Acceptance, E,D, NR by ALTHEA at 12/2/2022 0751                               User Key     Initials Effective Dates Name Provider Type Discipline    ALTHEA 06/16/21 -  Jaja Suresh OT Occupational Therapist OT              OT Recommendation and Plan  Planned Therapy Interventions (OT): activity tolerance training, adaptive equipment training, BADL retraining, functional balance retraining, occupation/activity based interventions, patient/caregiver education/training,  ROM/therapeutic exercise, strengthening exercise, transfer/mobility retraining  Therapy Frequency (OT): daily  Plan of Care Review  Plan of Care Reviewed With: patient  Progress: no change (OT IE)  Outcome Evaluation: OT evaluation completed. Pt presents with decreased I in ADLs, related t/fs, mobility compared to PLOF limited by decreased functional endurance toward more dynamic tasks, mild muscle weakness at BUEs, impaired balance, decreased distal reach toward LEs in part limited by KI donned AAT. OT issued LH reacher and sock aid to assist with LBD and improved performance anticipated as pt dep for d/d socks and LB garments at R side w/o use. Pt able to verbalize correct use of AE with some prior use with previous sxs. Reviewed with pt optimal position, seq, tech for LBD, no showering while nerve cath and wound vac, drain in place. Req'd min A for d/d gown, SUA for hand/face washing and oral care. Gross min A in STS t/fs and min A x 1-2 for SPT to recliner with FWW with increased time for seq with report of increased pain with weight bearing. Pt presented with decreased BP in sequential positions however denied s/s. RN aware, improved in return to sitting. Recommend IPOT POC and IRF at d/c based on underlying impairments and impact on fxn.     Time Calculation:    Time Calculation- OT     Row Name 12/02/22 0935             Time Calculation- OT    OT Start Time 0751  -JY      OT Received On 12/02/22  -JY      OT Goal Re-Cert Due Date 12/12/22  -JY         Timed Charges    95435 - OT Therapeutic Activity Minutes 10  -JY      41929 - OT Self Care/Mgmt Minutes 20  -JY         Untimed Charges    OT Eval/Re-eval Minutes 48  -JY         Total Minutes    Timed Charges Total Minutes 30  -JY      Untimed Charges Total Minutes 48  -JY       Total Minutes 78  -JY            User Key  (r) = Recorded By, (t) = Taken By, (c) = Cosigned By    Initials Name Provider Type    Jaja Morales OT Occupational Therapist               Therapy Charges for Today     Code Description Service Date Service Provider Modifiers Qty    76699973710  OT THERAPEUTIC ACT EA 15 MIN 12/2/2022 Jaja Suresh OT GO 1    [id number]  OT SELF CARE/MGMT/TRAIN EA 15 MIN 12/2/2022 Jaja Suresh OT GO 1    [id number]  OT THER SUPP EA 15 MIN 12/2/2022 Jaja Suresh OT GO 2    [id number]  OT EVAL LOW COMPLEXITY 4 12/2/2022 Jaja Suresh OT GO 1               Jaja Suresh OT  12/2/2022

## 2022-12-03 ENCOUNTER — APPOINTMENT (OUTPATIENT)
Dept: MRI IMAGING | Facility: HOSPITAL | Age: 57
End: 2022-12-03

## 2022-12-03 LAB
ANION GAP SERPL CALCULATED.3IONS-SCNC: 7 MMOL/L (ref 5–15)
BACTERIA SPEC AEROBE CULT: NORMAL
BUN SERPL-MCNC: 12 MG/DL (ref 6–20)
BUN/CREAT SERPL: 21.1 (ref 7–25)
CALCIUM SPEC-SCNC: 7.9 MG/DL (ref 8.6–10.5)
CHLORIDE SERPL-SCNC: 104 MMOL/L (ref 98–107)
CO2 SERPL-SCNC: 26 MMOL/L (ref 22–29)
CREAT SERPL-MCNC: 0.57 MG/DL (ref 0.76–1.27)
EGFRCR SERPLBLD CKD-EPI 2021: 114.4 ML/MIN/1.73
GLUCOSE BLDC GLUCOMTR-MCNC: 129 MG/DL (ref 70–130)
GLUCOSE BLDC GLUCOMTR-MCNC: 157 MG/DL (ref 70–130)
GLUCOSE BLDC GLUCOMTR-MCNC: 202 MG/DL (ref 70–130)
GLUCOSE BLDC GLUCOMTR-MCNC: 91 MG/DL (ref 70–130)
GLUCOSE SERPL-MCNC: 115 MG/DL (ref 65–99)
GRAM STN SPEC: NORMAL
POTASSIUM SERPL-SCNC: 4 MMOL/L (ref 3.5–5.2)
SODIUM SERPL-SCNC: 137 MMOL/L (ref 136–145)
VANCOMYCIN SERPL-MCNC: 20 MCG/ML (ref 5–40)

## 2022-12-03 PROCEDURE — A9577 INJ MULTIHANCE: HCPCS | Performed by: ORTHOPAEDIC SURGERY

## 2022-12-03 PROCEDURE — 25010000002 CEFTAROLINE FOSAMIL PER 10 MG

## 2022-12-03 PROCEDURE — 80202 ASSAY OF VANCOMYCIN: CPT

## 2022-12-03 PROCEDURE — 72158 MRI LUMBAR SPINE W/O & W/DYE: CPT

## 2022-12-03 PROCEDURE — 97116 GAIT TRAINING THERAPY: CPT

## 2022-12-03 PROCEDURE — 82962 GLUCOSE BLOOD TEST: CPT

## 2022-12-03 PROCEDURE — 63710000001 INSULIN DETEMIR PER 5 UNITS: Performed by: INTERNAL MEDICINE

## 2022-12-03 PROCEDURE — 63710000001 INSULIN LISPRO (HUMAN) PER 5 UNITS: Performed by: INTERNAL MEDICINE

## 2022-12-03 PROCEDURE — 73720 MRI LWR EXTREMITY W/O&W/DYE: CPT

## 2022-12-03 PROCEDURE — 0 GADOBENATE DIMEGLUMINE 529 MG/ML SOLUTION: Performed by: ORTHOPAEDIC SURGERY

## 2022-12-03 PROCEDURE — 63710000001 INSULIN LISPRO (HUMAN) PER 5 UNITS: Performed by: NURSE PRACTITIONER

## 2022-12-03 PROCEDURE — 80048 BASIC METABOLIC PNL TOTAL CA: CPT | Performed by: INTERNAL MEDICINE

## 2022-12-03 PROCEDURE — 25010000002 VANCOMYCIN 10 G RECONSTITUTED SOLUTION

## 2022-12-03 RX ADMIN — ACETAMINOPHEN 1000 MG: 500 TABLET, FILM COATED ORAL at 21:33

## 2022-12-03 RX ADMIN — PREGABALIN 150 MG: 150 CAPSULE ORAL at 08:53

## 2022-12-03 RX ADMIN — ASPIRIN 81 MG: 81 TABLET, COATED ORAL at 08:53

## 2022-12-03 RX ADMIN — GADOBENATE DIMEGLUMINE 18 ML: 529 INJECTION, SOLUTION INTRAVENOUS at 04:24

## 2022-12-03 RX ADMIN — MIDODRINE HYDROCHLORIDE 2.5 MG: 5 TABLET ORAL at 06:30

## 2022-12-03 RX ADMIN — INSULIN LISPRO 7 UNITS: 100 INJECTION, SOLUTION INTRAVENOUS; SUBCUTANEOUS at 16:58

## 2022-12-03 RX ADMIN — POLYETHYLENE GLYCOL 3350 17 G: 17 POWDER, FOR SOLUTION ORAL at 08:52

## 2022-12-03 RX ADMIN — MELOXICAM 15 MG: 15 TABLET ORAL at 08:52

## 2022-12-03 RX ADMIN — INSULIN DETEMIR 20 UNITS: 100 INJECTION, SOLUTION SUBCUTANEOUS at 20:26

## 2022-12-03 RX ADMIN — INSULIN LISPRO 3 UNITS: 100 INJECTION, SOLUTION INTRAVENOUS; SUBCUTANEOUS at 16:57

## 2022-12-03 RX ADMIN — VANCOMYCIN HYDROCHLORIDE 1250 MG: 10 INJECTION, POWDER, LYOPHILIZED, FOR SOLUTION INTRAVENOUS at 08:53

## 2022-12-03 RX ADMIN — ASPIRIN 81 MG: 81 TABLET, COATED ORAL at 20:27

## 2022-12-03 RX ADMIN — PREGABALIN 150 MG: 150 CAPSULE ORAL at 20:27

## 2022-12-03 RX ADMIN — INSULIN LISPRO 7 UNITS: 100 INJECTION, SOLUTION INTRAVENOUS; SUBCUTANEOUS at 08:52

## 2022-12-03 RX ADMIN — ACETAMINOPHEN 1000 MG: 500 TABLET, FILM COATED ORAL at 06:29

## 2022-12-03 RX ADMIN — MIDODRINE HYDROCHLORIDE 2.5 MG: 5 TABLET ORAL at 16:58

## 2022-12-03 RX ADMIN — MIDODRINE HYDROCHLORIDE 2.5 MG: 5 TABLET ORAL at 11:49

## 2022-12-03 RX ADMIN — INSULIN DETEMIR 20 UNITS: 100 INJECTION, SOLUTION SUBCUTANEOUS at 08:53

## 2022-12-03 RX ADMIN — TRAMADOL HYDROCHLORIDE 50 MG: 50 TABLET, COATED ORAL at 08:53

## 2022-12-03 RX ADMIN — INSULIN LISPRO 7 UNITS: 100 INJECTION, SOLUTION INTRAVENOUS; SUBCUTANEOUS at 11:48

## 2022-12-03 RX ADMIN — PANTOPRAZOLE SODIUM 40 MG: 40 TABLET, DELAYED RELEASE ORAL at 06:29

## 2022-12-03 RX ADMIN — VANCOMYCIN HYDROCHLORIDE 1250 MG: 10 INJECTION, POWDER, LYOPHILIZED, FOR SOLUTION INTRAVENOUS at 21:33

## 2022-12-03 RX ADMIN — CEFTAROLINE FOSAMIL 600 MG: 600 POWDER, FOR SOLUTION INTRAVENOUS at 20:27

## 2022-12-03 RX ADMIN — LEVOTHYROXINE SODIUM 25 MCG: 25 TABLET ORAL at 06:29

## 2022-12-03 RX ADMIN — Medication 10 ML: at 08:52

## 2022-12-03 RX ADMIN — ACETAMINOPHEN 1000 MG: 500 TABLET, FILM COATED ORAL at 14:37

## 2022-12-03 RX ADMIN — Medication 10 ML: at 20:27

## 2022-12-03 RX ADMIN — CEFTAROLINE FOSAMIL 600 MG: 600 POWDER, FOR SOLUTION INTRAVENOUS at 10:10

## 2022-12-03 NOTE — THERAPY TREATMENT NOTE
Patient Name: Melchor Hardwick III  : 1965    MRN: [id number]                              Today's Date: 12/3/2022       Admit Date: 2022    Visit Dx:     ICD-10-CM ICD-9-CM   1. Infection of right knee (HCC)  M00.9 686.9     Patient Active Problem List   Diagnosis   • Hyperlipidemia   • Hypertensive disorder   • Obesity   • Type 2 diabetes mellitus with hyperglycemia, with long-term current use of insulin (HCC)   • Gas gangrene of foot (HCC)   • Cellulitis in diabetic foot (HCC)   • Other acute osteomyelitis of left foot (HCC)   • Osteomyelitis (HCC)   • Critical illness myopathy   • Staphylococcal arthritis of right knee (HCC)   • Other cirrhosis of liver (HCC)   • MRSA bacteremia   • Endocarditis of tricuspid valve   • Wound of right buttock   • History of osteomyelitis   • Debility   • Infection of right knee (HCC)   • S/P right knee implant removal with insertion of antibiotic spacer   • Hypothyroid     Past Medical History:   Diagnosis Date   • Diabetes mellitus (HCC)     4 times per day gets checked for sugar at rehab   • Dizzy    • Hyperlipidemia    • Hypertension    • Hypothyroid 2022   • MRSA infection     blood -      • Orthostatic hypotension    • Pressure sore on buttocks     top crack -  sees wound - but now rn take care of it at rehab - minor opening - dime size - pat nurse didnt assess-  pt states getting better   • Pyogenic arthritis of right knee joint, due to unspecified organism (HCC) 2022   • Sepsis (HCC)    • Wears glasses     readers     Past Surgical History:   Procedure Laterality Date   • ABSCESS DRAINAGE  2004    PERINEUM   • AMPUTATION FOOT Left 2022    Procedure: AMPUTATION FOOT;  Surgeon: Addison Colby MD;  Location: [facility];  Service: Podiatry;  Laterality: Left;   • INCISION AND DRAINAGE LEG Left 05/10/2022    Procedure: INCISION AND DRAINAGE LOWER EXTREMITY;  Surgeon: Addison Colby MD;  Location: [facility] OR;  Service: Podiatry;  Laterality:  Left;   • KNEE INCISION AND DRAINAGE Right 09/21/2022    Procedure: KNEE INCISION AND DRAINAGE RIGHT;  Surgeon: Brain Bentley MD;  Location:  [facility] OR;  Service: Orthopedics;  Laterality: Right;   • PERIPHERALLY INSERTED CENTRAL CATHETER INSERTION Left    • TOTAL KNEE  PROSTHESIS REMOVAL W/ SPACER INSERTION Right 11/30/2022    Procedure: ANTIBIOTIC SPACER PLACEMENT KNEE - RIGHT;  Surgeon: Brain Bentley MD;  Location:  [facility] OR;  Service: Orthopedics;  Laterality: Right;   • WOUND DEBRIDEMENT Left 05/13/2022    Procedure: DEBRIDEMENT FOOT;  Surgeon: Addison Colby MD;  Location:  [facility] OR;  Service: Podiatry;  Laterality: Left;      General Information     Row Name 12/03/22 1133          Physical Therapy Time and Intention    Document Type therapy note (daily note)  -     Mode of Treatment physical therapy  -     Row Name 12/03/22 1133          General Information    Patient Profile Reviewed yes  -     Existing Precautions/Restrictions fall;brace on at all times;other (see comments);orthostatic hypotension  WBAT R LE, K.I. on at all times, hemovac, nerve catheter, L walking boot for h/o transmetatarsal amputations, orthostatic hypotension-usually wears abdominal binder  -     Row Name 12/03/22 1133          Cognition    Orientation Status (Cognition) oriented x 4  -     Row Name 12/03/22 1133          Safety Issues, Functional Mobility    Safety Issues Affecting Function (Mobility) safety precaution awareness;safety precautions follow-through/compliance;sequencing abilities  -     Impairments Affecting Function (Mobility) balance;endurance/activity tolerance;pain;range of motion (ROM);postural/trunk control;strength  -           User Key  (r) = Recorded By, (t) = Taken By, (c) = Cosigned By    Initials Name Provider Type     Ehsan De La Cruz, PT Physical Therapist               Mobility     Row Name 12/03/22 1133          Bed Mobility    Bed Mobility supine-sit;scooting/bridging  -      Scooting/Bridging Cascade (Bed Mobility) modified independence  -     Supine-Sit Cascade (Bed Mobility) modified independence  -     Row Name 12/03/22 1133          Transfers    Comment, (Transfers) cues for hand placement  -     Row Name 12/03/22 1133          Sit-Stand Transfer    Sit-Stand Cascade (Transfers) contact guard;1 person assist;verbal cues;nonverbal cues (demo/gesture)  -     Assistive Device (Sit-Stand Transfers) walker, front-wheeled  -     Row Name 12/03/22 1133          Gait/Stairs (Locomotion)    Cascade Level (Gait) verbal cues;minimum assist (75% patient effort);1 person assist  -     Assistive Device (Gait) walker, front-wheeled  -     Distance in Feet (Gait) 40  -     Deviations/Abnormal Patterns (Gait) right sided deviations;antalgic;stride length decreased;weight shifting decreased;ataxic;ayo decreased;gait speed decreased  -     Bilateral Gait Deviations forward flexed posture;heel strike decreased  -     Right Sided Gait Deviations knee buckling, right side  -     Comment, (Gait/Stairs) Gait training focused on safe management of RW with verbal cues for upright posture, proper positioning of walker, increasing step length, and improving weight bearing cues.  -     Row Name 12/03/22 1133          Mobility    Extremity Weight-bearing Status left lower extremity;right lower extremity  -     Left Lower Extremity (Weight-bearing Status) weight-bearing as tolerated (WBAT);other (see comments)  walking boot  -     Right Lower Extremity (Weight-bearing Status) weight-bearing as tolerated (WBAT);other (see comments)  KI AAT x2 weeks  -           User Key  (r) = Recorded By, (t) = Taken By, (c) = Cosigned By    Initials Name Provider Type     Ehsan De La Cruz PT Physical Therapist               Obj/Interventions     [facility] Name 12/03/22 1134          Balance    Balance Assessment standing static balance;standing dynamic balance  -     Static  Standing Balance minimal assist;verbal cues;non-verbal cues (demo/gesture);1-person assist  -     Dynamic Standing Balance minimal assist;non-verbal cues (demo/gesture);verbal cues;1-person assist  [facility]     Position/Device Used, Standing Balance supported;walker, rolling  -     Balance Interventions sitting;standing;sit to stand;supported;static;dynamic;minimal challenge  [facility]           User Key  (r) = Recorded By, (t) = Taken By, (c) = Cosigned By    Initials Name Provider Type     Ehsan De La Cruz, PT Physical Therapist               Goals/Plan    No documentation.                Clinical Impression     [facility] Name 12/03/22 1135          Pain    Pretreatment Pain Rating 5/10  -     Posttreatment Pain Rating 5/10  [facility]     Pain Location - Side/Orientation Right  -     Pain Location generalized  -     Pain Location - knee  -     Pain Intervention(s) Repositioned;Ambulation/increased activity  -[facility] Name 12/03/22 1135          Plan of Care Review    Plan of Care Reviewed With patient  -     Progress improving  -     Outcome Evaluation Pt ambulated 40 feet with RW and min A for management of walker. He is progressing well with mobility overall.  -[facility] Name 12/03/22 1135          Therapy Assessment/Plan (PT)    Rehab Potential (PT) good, to achieve stated therapy goals  -     Criteria for Skilled Interventions Met (PT) yes;meets criteria;skilled treatment is necessary  [facility]     Therapy Frequency (PT) daily  -[facility] Name 12/03/22 1135          Vital Signs    O2 Delivery Pre Treatment room air  -     O2 Delivery Intra Treatment room air  -     O2 Delivery Post Treatment room air  -     Pre Patient Position Supine  -     Intra Patient Position Standing  -     Post Patient Position Sitting  -[facility] Name 12/03/22 1135          Positioning and Restraints    Pre-Treatment Position in bed  -     Post Treatment Position chair  -     In Chair notified nsg;reclined;call light within  reach;encouraged to call for assist;exit alarm on;R knee immobilizer  left walking boot  -           User Key  (r) = Recorded By, (t) = Taken By, (c) = Cosigned By    Initials Name Provider Type    Ehsan Bajwa, PT Physical Therapist               Outcome Measures     Row Name 12/03/22 1136          How much help from another person do you currently need...    Turning from your back to your side while in flat bed without using bedrails? 4  -JH     Moving from lying on back to sitting on the side of a flat bed without bedrails? 4  -JH     Moving to and from a bed to a chair (including a wheelchair)? 3  -JH     Standing up from a chair using your arms (e.g., wheelchair, bedside chair)? 3  -JH     Climbing 3-5 steps with a railing? 2  -JH     To walk in hospital room? 3  -     AM-PAC 6 Clicks Score (PT) 19  -     Highest level of mobility 6 --> Walked 10 steps or more  -     Row Name 12/03/22 1136          Functional Assessment    Outcome Measure Options AM-PAC 6 Clicks Basic Mobility (PT)  -           User Key  (r) = Recorded By, (t) = Taken By, (c) = Cosigned By    Initials Name Provider Type    Ehsan Bajwa, PT Physical Therapist                             Physical Therapy Education     Title: PT OT SLP Therapies (In Progress)     Topic: Physical Therapy (Done)     Point: Mobility training (Done)     Learning Progress Summary           Patient Acceptance, E,TB, VU by  at 12/3/2022 1137    Acceptance, E, NR by AS at 12/2/2022 1051    Eager, E, VU,DU,NR by  at 12/1/2022 0946    Comment: Educated pt. safety/technique with bed mobility, transfers, ambulation, WB status, use of knee immobilizer, PT POC                   Point: Home exercise program (Done)     Learning Progress Summary           Patient Acceptance, E,TB, VU by  at 12/3/2022 1137    Acceptance, E, NR by AS at 12/2/2022 1051    Eager, E, VU,DU,NR by  at 12/1/2022 0946    Comment: Educated pt. safety/technique with bed mobility,  transfers, ambulation, WB status, use of knee immobilizer, PT POC                   Point: Body mechanics (Done)     Learning Progress Summary           Patient Acceptance, E,TB, VU by  at 12/3/2022 1137    Acceptance, E, NR by AS at 12/2/2022 1051    Eager, E, VU,DU,NR by  at 12/1/2022 0946    Comment: Educated pt. safety/technique with bed mobility, transfers, ambulation, WB status, use of knee immobilizer, PT POC                   Point: Precautions (Done)     Learning Progress Summary           Patient Acceptance, E,TB, VU by  at 12/3/2022 1137    Acceptance, E, NR by AS at 12/2/2022 1051    Eager, E, VU,DU,NR by  at 12/1/2022 0946    Comment: Educated pt. safety/technique with bed mobility, transfers, ambulation, WB status, use of knee immobilizer, PT POC                               User Key     Initials Effective Dates Name Provider Type Discipline    AS 06/16/21 -  Ana Cristina Gutierrez, PTA Physical Therapist Assistant PT     09/22/20 -  Ehsan De La Cruz, JANNETH Physical Therapist PT     06/01/21 -  Susan Quesada, JANNETH Physical Therapist PT              PT Recommendation and Plan     Plan of Care Reviewed With: patient  Progress: improving  Outcome Evaluation: Pt ambulated 40 feet with RW and min A for management of walker. He is progressing well with mobility overall.     Time Calculation:    PT Charges     Row Name 12/03/22 1137             Time Calculation    Start Time 1116  -      PT Received On 12/03/22  -      PT Goal Re-Cert Due Date 01/11/22  -         Time Calculation- PT    Total Timed Code Minutes- PT 14 minute(s)  -         Timed Charges    68292 - Gait Training Minutes  14  -         Total Minutes    Timed Charges Total Minutes 14  -       Total Minutes 14  -            User Key  (r) = Recorded By, (t) = Taken By, (c) = Cosigned By    Initials Name Provider Type     Ehsan De La Cruz, PT Physical Therapist              Therapy Charges for Today     Code Description Service  Date Service Provider Modifiers Qty    [id number] HC GAIT TRAINING EA 15 MIN 12/3/2022 Ehsan De La Cruz, PT GP 1          PT G-Codes  Outcome Measure Options: AM-PAC 6 Clicks Basic Mobility (PT)  AM-PAC 6 Clicks Score (PT): 19  AM-PAC 6 Clicks Score (OT): 16  PT Discharge Summary  Anticipated Discharge Disposition (PT): inpatient rehabilitation facility    Ehsan De La Cruz, JANNETH  12/3/2022

## 2022-12-03 NOTE — PROGRESS NOTES
[facility] Progress Note        Antibiotics:  Anti-Infectives (From admission, onward)    Ordered     Dose/Rate Route Frequency Start Stop    12/02/22 1159  ceftaroline (TEFLARO) 600 mg/100 mL 0.9% NS (mbp)        Ordering Provider: Kushal Balderrama RPH    600 mg Intravenous Every 12 Hours Scheduled 12/02/22 1500 12/09/22 2059    12/02/22 1159  vancomycin 1250 mg/250 mL 0.9% NS IVPB ([facility])        Ordering Provider: Kushal Balderrama RPHALEY    1,250 mg  over 90 Minutes Intravenous Every 12 Hours Scheduled 12/02/22 1245 12/09/22 0859    12/01/22 0933  vancomycin 1750 mg/500 mL 0.9% NS IVPB ([facility])        Ordering Provider: Bryce Euceda MD    20 mg/kg × 88.9 kg  over 120 Minutes Intravenous Once 12/01/22 1030 12/01/22 1310    12/01/22 0919  Pharmacy to dose vancomycin        Ordering Provider: rByce Euceda MD     Does not apply Continuous PRN 12/01/22 0919 12/29/22 0918    11/30/22 1449  ceFAZolin in dextrose (ANCEF) IVPB solution 2 g        Ordering Provider: Brain Bentley MD    2 g  over 30 Minutes Intravenous Every 8 Hours 11/30/22 1800 12/01/22 0151    11/30/22 0815  ceFAZolin in dextrose (ANCEF) IVPB solution 2 g        Ordering Provider: Brain Bentley MD    2 g  over 30 Minutes Intravenous Once 11/30/22 0817 11/30/22 1045    11/30/22 0815  vancomycin 1250 mg/250 mL 0.9% NS IVPB ([facility])        Ordering Provider: Brain Bentley MD    15 mg/kg × 88.9 kg Intravenous Once 11/30/22 0817 11/30/22 0902          CC: fatigue    HPI:    Patient is a 57 y.o.  Yr old male with history of cirrhosis/diabetes and other comorbidity as outlined below.  History of left foot gas gangrene with osteomyelitis and MRSA bacteremia treated in [place] by Dr. Giordano in May/June 2022.  Family reports left transmetatarsal amputation in approximately 8 weeks IV vancomycin.  Notes from [place] indicate transthoracic echocardiogram no vegetation per Dr. Giordano; he thinks he might of had several weeks of oral antibiotic after that but not  entirely clear.  He is admitted to [facility] September 20 with progressive right knee pain occurring over the past week preceding admission.  He thinks he might of twisted it but ended up with progressive redness/swelling and pain.  No specific blunt force or penetrating trauma.  he was evaluated by orthopedics and taken to the operating room for right knee incision/drainage and concern for septic arthritis per Dr. Bentley; blood cultures had  MRSA.  Had dysuria but urinalysis not consistent with UTI.  No hematuria or pyuria     9/23 with TV echodensity; 9/26/22  TYRESE no vegetation per cardiology; daptomycin maintained, transferred to [place] with care taken over by Dr. Giordano; repeat blood cultures there on October 18 and October 20 and October 22 (dapto STU = 1)  with MRSA; blood cultures on October 24 and November 12 were negative per microbiology.  Repeat blood cultures November 20 with MRSA and daptomycin STU equal to 3, not susceptible per my discussion with microbiology; blood cultures positive again on November 22 but negative November 23. per Dr Giordano, teflaro had been added and concern regarding right knee with increased swelling/pain prompted transitioned back to [place] for further right knee surgery on November 30 by Dr Bentley     **I d/w Dr Giordano; he felt there was an obvious right arm PICC line infection in October with changes at the exit site and concern it was the reason for positive blood cultures at that time.  No catheter tip culture available per micro.  In November, recurrent bacteremia associated with right knee swelling/pain but no other focal symptoms per my discussion with Dr. Giordano; at risk for sequestered/metastatic focus    12/3/22 postop right knee with controlled pain,  dull at present,  Better controlled per nursing overall, nonradiating, worse with movement, better with pain meds and 2 out of 10 in severity at present; he denies any other focal musculoskeletal pain.  " No new back pain but he does relate chronic lumbar pain, dull at present, worse with movement, not progressive and no new weakness or numbness. No myalgia     Left foot with no redness/swelling or pain.  Surgical site healed with no open wound or active drainage. Has a left arm midline     No headache photophobia or neck stiffness.  No nausea vomiting diarrhea or abdominal pain.  No shortness of breath cough or hemoptysis.  No other new skin rash.  No other recent procedures or interventions.    No indwelling pacemaker        ROS:      12/3/22 No f/c/s. No n/v/d. No rash. No new ADR to Abx.     Constitutional-- No Fever, chills or sweats.  Appetite diminished with fatigue.  Heent-- No new vision, hearing or throat complaints.  No epistaxis or oral sores.  Denies odynophagia or dysphagia.  No flashers, floaters or eye pain.   No headache, photophobia or neck stiffness.  CV-- No chest pain, palpitation or syncope  Resp-- No SOB/cough/Hemoptysis  GI- No nausea, vomiting, or diarrhea.  No hematochezia, melena, or hematemesis. Denies jaundice  -- No dysuria, hematuria, or flank pain.  Denies hesitancy, urgency or flank pain.  Lymph- no swollen lymph nodes in neck/axilla or groin.   Heme- No active bruising or bleeding; no Hx of DVT or PE.  MS-- no swelling or pain in the bones or joints of arms/legs.  No new back pain.  Neuro-- No acute focal weakness or numbness in the arms or legs.  No seizures.     Full 12 point review of systems reviewed and negative otherwise for acute complaints, except for above      PE:   /64 (BP Location: Left arm, Patient Position: Lying)   Pulse 84   Temp 98.1 °F (36.7 °C) (Oral)   Resp 18   Ht 177.8 cm (70\")   Wt 88.9 kg (196 lb)   SpO2 96%   BMI 28.12 kg/m²     GENERAL: Awake and alert, in no acute distress.  Chronically ill-appearing  HEENT: Normocephalic, atraumatic.  PERRL. EOMI. No conjunctival injection. No icterus. Oropharynx clear without evidence of thrush or exudate. " No evidence of peridontal disease.    NECK: Supple without nuchal rigidity. No mass.  LYMPH: No cervical, axillary or inguinal lymphadenopathy.  HEART: RRR; soft murmur LSB, rubs, gallops.   LUNGS: Diminished at bases but otherwise clear to auscultation bilaterally without wheezing, rales, rhonchi. Normal respiratory effort. Nonlabored. No dullness.  ABDOMEN: Soft, nontender, nondistended. Positive bowel sounds. No rebound or guarding. NO mass or HSM.  EXT:  No cyanosis, clubbing or edema. No cord.  : Genitalia generally unremarkable.  Without Ortiz catheter.  MSK: FROM without joint effusions noted arms/legs.    SKIN: Warm and dry without cutaneous eruptions on Inspection/palpation.    NEURO: Oriented to PPT. No focal deficits on motor/sensory exam at arms/legs.  PSYCHIATRIC: Normal insight and judgement. Cooperative with PE     Left foot with no redness induration or warmth.  No discrete mass bulge or fluctuance.  No crepitus or bulla.  Prior surgical site healed at [facility].  No open wound or active drainage     Right knee postop surgical site covered.  Exposed skin without obvious redness induration or warmth.  No discrete mass bulge or fluctuance.  No crepitus or bulla.     No peripheral stigmata/phenomena of endocarditis     IV without obvious redness or drainage    Laboratory Data    Results from last 7 days   Lab Units 12/02/22  0431 12/01/22  0824 11/28/22  1256   WBC 10*3/mm3  --  9.88 5.90   HEMOGLOBIN g/dL 7.9* 10.2* 11.1*   HEMATOCRIT % 25.0* 31.6* 36.0*   PLATELETS 10*3/mm3  --  139* 144     Results from last 7 days   Lab Units 12/03/22  0503   SODIUM mmol/L 137   POTASSIUM mmol/L 4.0   CHLORIDE mmol/L 104   CO2 mmol/L 26.0   BUN mg/dL 12   CREATININE mg/dL 0.57*   GLUCOSE mg/dL 115*   CALCIUM mg/dL 7.9*     Results from last 7 days   Lab Units 11/28/22  1256   ALK PHOS U/L 294*   BILIRUBIN mg/dL 0.4   ALT (SGPT) U/L 27   AST (SGOT) U/L 46*     Results from last 7 days   Lab Units 11/28/22  1256   SED  RATE mm/hr 79*     Results from last 7 days   Lab Units 11/28/22  1256   CRP mg/dL 3.52*       Estimated Creatinine Clearance: 160.6 mL/min (A) (by C-G formula based on SCr of 0.57 mg/dL (L)).      Microbiology:      Radiology:  Imaging Results (Last 72 Hours)     Procedure Component Value Units Date/Time    MRI Lumbar Spine With & Without Contrast [[id number]] Resulted: 12/03/22 0425     Updated: 12/03/22 0426    MRI Foot Left With & Without Contrast [[id number]] Resulted: 12/03/22 0424     Updated: 12/03/22 0424    XR Knee 1 or 2 View Right [[id number]] Collected: 11/30/22 1542     Updated: 11/30/22 1545    Narrative:      DATE OF EXAM: 11/30/2022 3:20 PM     PROCEDURE: XR KNEE 1 OR 2 VW RIGHT-     INDICATIONS: Post-Op Knee Arthoplasty; M00.9-Pyogenic arthritis,  unspecified     COMPARISON: 9/20/2022     TECHNIQUE: One to two radiologic views of the right knee were obtained.     FINDINGS:  Postsurgical findings of interval right total knee arthroplasty without  evidence of immediate hardware complication. Expected soft tissue edema  and subcutaneous emphysema.        Impression:      Postsurgical findings of interval right total knee arthroplasty without  evidence of immediate hardware complication. Expected soft tissue edema  and subcutaneous emphysema.      This report was finalized on 11/30/2022 3:42 PM by Garry Gonzalez.               Impression:       --Acute/recurrent/persistent MRSA bacteremia/septicemia; prior history positive cultures May 2022 and recurrent despite prior  IV vancomycin and left foot surgery in addition to other supportive measures.   TTE with ? TV echodensity prior admit in September and TYRESE no vegetation at that time at [facility];   blood culture positivity again at [facility] in October/November as above.  Daptomycin STU increased to 3 on 11/20 isolate and not sensitive to daptomycin per microbiology.  Discussed on several occasions with Dr. Giordano; had a repeat TYRESE on October 31  "with mention of \" moderate size echodense structure above the posterior tricuspid leaflet but not attached to the leaflet\" per cardiology there, described potentially as a \"normal variant\" per their note.  High risk for further serious morbidity and other serious sequela including persistent/progressive or recurrent infection, metastatic foci of involvement and other dire consequences;  vancomycin sensitivity maintained albeit with STU =2; adjusted to vancomycin/ceftaroline in light of daptomycin resistance and I asked microbiology     **Blood cultures November 23 negative so far  **repeat TYRESE 12/1 ; no definitive vegetation per cardiology and only mild tricuspid valve regurgitation stable from prior exam per them.  Ill-defined area in the right atrium appeared stable to them. D/w Dr Trent     --Acute right knee pain/septic arthritis with surgical intervention, incision/debridement by Dr. Bentley on September 21.   MRSA+ in the setting of MRSA bacteremia at that time;  Repeat surgery 11/30, d/w Dr Bentley.     --History of cirrhosis by records.  Unclear etiology.   further GI referral/eval per  medicine team     --Diabetes.  You need to tightly control blood sugar to give best chance for healing.;  Described as uncontrolled by medicine team at admission     -- Chronic lumbar back pain for decades.  This is not new, not worse and no new location.  No new exacerbation or new neurologic symptomatology in his extremities.  Nonetheless, may require further imaging depending on his clinical course to help exclude any other sequestered/spinal-paraspinal focus     --TCP ; monitor     PLAN:       -- IV vancomycin/ceftaroline     -- Check/review labs cultures and scans     -- Partial history per nursing staff     --d/w pharmacy       -- Highly complex set of issues with high risk for further serious morbidity and other serious sequela     -- Discussed with microbiology; they are doing further assessment to blood cultures drawn " November 22     --d/w Dr Giordano and Dr Bentley and Dr Diaz and Dr Trent    --nursing removed  Midline; new PICC line, MRI of lumbar spine to rule out occult focus and MRI of foot to ensure stability from prior.  No specific new pain or other suppurative changes at the surface at either site.  I discussed with Dr. Trent regarding additional diagnostics for endocarditis such as PET scan; this still may need to be considered as outpatient depending on clinical course.      **MRI's pending       Bryce Euceda MD  12/3/2022

## 2022-12-03 NOTE — PLAN OF CARE
Patient has been resting with no complaints of pain. VSS. Nerve cath removed overnight for MRI. Wound vac in place. Dressing C/D/I. PICC in place .  Immobilizer in place. Will coninue to monitor

## 2022-12-03 NOTE — PLAN OF CARE
Goal Outcome Evaluation:   Patient has been resting with no acute signs of distress. VSS. AO x4. Minor complaint of pain overnight. Nerve cath removed for MRI overnight. Wound vac also disconnected for the duration of the MRI. Dressing C/D/I. Immobilizer in place. Will coninue to monitor as patient progresses in their care.

## 2022-12-03 NOTE — PLAN OF CARE
Goal Outcome Evaluation:  Plan of Care Reviewed With: patient        Progress: improving  Outcome Evaluation: Pt ambulated 40 feet with RW and min A for management of walker. He is progressing well with mobility overall.

## 2022-12-03 NOTE — PROGRESS NOTES
"IM progress note      Melchor Hardwick III  [id number]  1965     LOS: 3 days     Attending: Brain Bentley MD    Primary Care Provider: Missy Up APRN    Late Entry:    Chief Complaint/Reason for visit:  No chief complaint on file.      Subjective   Doing well overall.  Expected postop pain, no complains of nausea, vomiting, or shortness of breath.     Objective        Visit Vitals  /64 (BP Location: Left arm, Patient Position: Lying)   Pulse 84   Temp 98.1 °F (36.7 °C) (Oral)   Resp 18   Ht 177.8 cm (70\")   Wt 88.9 kg (196 lb)   SpO2 96%   BMI 28.12 kg/m²     Temp (24hrs), Av.2 °F (36.8 °C), Min:98.1 °F (36.7 °C), Max:98.2 °F (36.8 °C)      Intake/Output:    Intake/Output Summary (Last 24 hours) at 12/3/2022 1221  Last data filed at 12/3/2022 0900  Gross per 24 hour   Intake 765 ml   Output 1620 ml   Net -855 ml           Physical Exam:     General Appearance:    Alert, cooperative, in no acute distress   Head:    Normocephalic, without obvious abnormality, atraumatic    Lungs:     Normal effort, symmetric chest rise,  clear to      auscultation bilaterally              Heart:    Regular rhythm and normal rate, normal S1 and S2    Abdomen:     Normal bowel sounds, no masses, no organomegaly, soft        non-tender, non-distended, no guarding, no rebound                tenderness   Extremities:  Ace wrapped, knee immobilizer on right knee.  Drain, Prevena suction dressing. PNB cath infuse pump.  Left leg with long boot on, prior partial foot amputation history.      Pulses:   Pulses palpable and equal bilaterally               Results Review:     I reviewed the patient's new clinical results.   Results from last 7 days   Lab Units 22  0431 22  0824 22  1256   WBC 10*3/mm3  --  9.88 5.90   HEMOGLOBIN g/dL 7.9* 10.2* 11.1*   HEMATOCRIT % 25.0* 31.6* 36.0*   PLATELETS 10*3/mm3  --  139* 144     Results from last 7 days   Lab Units 22  0431 22  1509 22  0824 " 11/28/22  1256   SODIUM mmol/L 138 133*   < > 133*   POTASSIUM mmol/L 3.8 4.1   < > 3.9   CHLORIDE mmol/L 103 100   < > 97*   CO2 mmol/L 27.0 26.0   < > 23.0   BUN mg/dL 13 18   < > 11   CREATININE mg/dL 0.63* 0.70*   < > 0.63*   CALCIUM mg/dL 7.7* 7.9*   < > 8.5*   BILIRUBIN mg/dL  --   --   --  0.4   ALK PHOS U/L  --   --   --  294*   ALT (SGPT) U/L  --   --   --  27   AST (SGOT) U/L  --   --   --  46*   GLUCOSE mg/dL 167* 233*   < > 286*    < > = values in this interval not displayed.        I reviewed the patient's new imaging including images and reports.    All medications reviewed.   acetaminophen, 1,000 mg, Oral, Q8H  aspirin, 81 mg, Oral, Q12H  ceftaroline, 600 mg, Intravenous, Q12H  insulin detemir, 20 Units, Subcutaneous, Q12H  insulin lispro, 0-7 Units, Subcutaneous, TID AC  Insulin Lispro, 7 Units, Subcutaneous, TID With Meals  levothyroxine, 25 mcg, Oral, Q AM  meloxicam, 15 mg, Oral, Daily  midodrine, 2.5 mg, Oral, TID AC  pantoprazole, 40 mg, Oral, Q AM  polyethylene glycol, 17 g, Oral, Daily  pregabalin, 150 mg, Oral, Q12H  sodium chloride, 10 mL, Intravenous, Q12H  vancomycin, 1,250 mg, Intravenous, Q12H      cyclobenzaprine, 5 mg, TID PRN  dextrose, 25 g, Q15 Min PRN  dextrose, 15 g, Q15 Min PRN  diphenhydrAMINE, 25 mg, Q6H PRN   Or  diphenhydrAMINE, 25 mg, Q6H PRN  glucagon (human recombinant), 1 mg, Q15 Min PRN  HYDROmorphone, 0.5 mg, Q2H PRN   And  naloxone, 0.1 mg, Q5 Min PRN  ketorolac, 15 mg, Q6H PRN  labetalol, 10 mg, Q4H PRN  ondansetron, 4 mg, Q6H PRN   Or  ondansetron, 4 mg, Q6H PRN  oxyCODONE, 10 mg, Q4H PRN  oxyCODONE, 5 mg, Q4H PRN  Pharmacy to dose vancomycin, , Continuous PRN  sodium chloride, 500 mL, TID PRN  sodium chloride, 10 mL, PRN  traMADol, 50 mg, Q8H PRN        Assessment & Plan       S/P right knee implant removal with insertion of antibiotic spacer    Type 2 diabetes mellitus with hyperglycemia, with long-term current use of insulin (HCC)    Infection of right knee  (HCC)    Hypothyroid  MRSA sepsis  Chronic low back pain     Plan  1. PT/OT, weightbearing per protocol.  2. Pain control-prns   3. IS-encouraged  4. DVT proph-mechanicals, aspirin.  5. Bowel regimen  6. Monitor post-op labs  7. DC planning       Hypothyroid  -Continue home Synthroid     DM  - hgb A1c on 11/28/2022 7.3  -Continue home bolus insulin with meals and long-acting insulin twice daily/ doses increased 12/1.   - Accu-Chek AC and HS with low dose SSI    TYRESE,no evidence of vegetation.    Will get MRI of left foot and back/lumbar.    D/w  with ID.  Looking to get new PICC this admit.      Rose Diaz MD  12/03/22  12:21 EST

## 2022-12-04 LAB
ANION GAP SERPL CALCULATED.3IONS-SCNC: 6 MMOL/L (ref 5–15)
BUN SERPL-MCNC: 11 MG/DL (ref 6–20)
BUN/CREAT SERPL: 18 (ref 7–25)
CALCIUM SPEC-SCNC: 7.9 MG/DL (ref 8.6–10.5)
CHLORIDE SERPL-SCNC: 106 MMOL/L (ref 98–107)
CO2 SERPL-SCNC: 28 MMOL/L (ref 22–29)
CREAT SERPL-MCNC: 0.61 MG/DL (ref 0.76–1.27)
EGFRCR SERPLBLD CKD-EPI 2021: 112 ML/MIN/1.73
GLUCOSE BLDC GLUCOMTR-MCNC: 163 MG/DL (ref 70–130)
GLUCOSE BLDC GLUCOMTR-MCNC: 209 MG/DL (ref 70–130)
GLUCOSE BLDC GLUCOMTR-MCNC: 314 MG/DL (ref 70–130)
GLUCOSE BLDC GLUCOMTR-MCNC: 72 MG/DL (ref 70–130)
GLUCOSE BLDC GLUCOMTR-MCNC: 94 MG/DL (ref 70–130)
GLUCOSE SERPL-MCNC: 84 MG/DL (ref 65–99)
POTASSIUM SERPL-SCNC: 4.2 MMOL/L (ref 3.5–5.2)
SODIUM SERPL-SCNC: 140 MMOL/L (ref 136–145)

## 2022-12-04 PROCEDURE — 82962 GLUCOSE BLOOD TEST: CPT

## 2022-12-04 PROCEDURE — 63710000001 INSULIN DETEMIR PER 5 UNITS: Performed by: INTERNAL MEDICINE

## 2022-12-04 PROCEDURE — 25010000002 CEFTAROLINE FOSAMIL PER 10 MG

## 2022-12-04 PROCEDURE — 63710000001 INSULIN LISPRO (HUMAN) PER 5 UNITS: Performed by: NURSE PRACTITIONER

## 2022-12-04 PROCEDURE — 80048 BASIC METABOLIC PNL TOTAL CA: CPT | Performed by: INTERNAL MEDICINE

## 2022-12-04 PROCEDURE — 25010000002 VANCOMYCIN 10 G RECONSTITUTED SOLUTION

## 2022-12-04 PROCEDURE — 97116 GAIT TRAINING THERAPY: CPT

## 2022-12-04 PROCEDURE — 63710000001 INSULIN LISPRO (HUMAN) PER 5 UNITS: Performed by: INTERNAL MEDICINE

## 2022-12-04 RX ADMIN — ACETAMINOPHEN 1000 MG: 500 TABLET, FILM COATED ORAL at 05:04

## 2022-12-04 RX ADMIN — PANTOPRAZOLE SODIUM 40 MG: 40 TABLET, DELAYED RELEASE ORAL at 05:04

## 2022-12-04 RX ADMIN — MIDODRINE HYDROCHLORIDE 2.5 MG: 5 TABLET ORAL at 11:45

## 2022-12-04 RX ADMIN — ACETAMINOPHEN 1000 MG: 500 TABLET, FILM COATED ORAL at 13:01

## 2022-12-04 RX ADMIN — POLYETHYLENE GLYCOL 3350 17 G: 17 POWDER, FOR SOLUTION ORAL at 08:43

## 2022-12-04 RX ADMIN — CEFTAROLINE FOSAMIL 600 MG: 600 POWDER, FOR SOLUTION INTRAVENOUS at 08:42

## 2022-12-04 RX ADMIN — CEFTAROLINE FOSAMIL 600 MG: 600 POWDER, FOR SOLUTION INTRAVENOUS at 20:32

## 2022-12-04 RX ADMIN — PREGABALIN 150 MG: 150 CAPSULE ORAL at 20:36

## 2022-12-04 RX ADMIN — VANCOMYCIN HYDROCHLORIDE 1250 MG: 10 INJECTION, POWDER, LYOPHILIZED, FOR SOLUTION INTRAVENOUS at 10:00

## 2022-12-04 RX ADMIN — INSULIN LISPRO 7 UNITS: 100 INJECTION, SOLUTION INTRAVENOUS; SUBCUTANEOUS at 17:37

## 2022-12-04 RX ADMIN — Medication 10 ML: at 20:36

## 2022-12-04 RX ADMIN — INSULIN DETEMIR 20 UNITS: 100 INJECTION, SOLUTION SUBCUTANEOUS at 20:34

## 2022-12-04 RX ADMIN — MELOXICAM 15 MG: 15 TABLET ORAL at 08:43

## 2022-12-04 RX ADMIN — Medication 10 ML: at 08:44

## 2022-12-04 RX ADMIN — ASPIRIN 81 MG: 81 TABLET, COATED ORAL at 20:35

## 2022-12-04 RX ADMIN — VANCOMYCIN HYDROCHLORIDE 1250 MG: 10 INJECTION, POWDER, LYOPHILIZED, FOR SOLUTION INTRAVENOUS at 21:33

## 2022-12-04 RX ADMIN — INSULIN LISPRO 2 UNITS: 100 INJECTION, SOLUTION INTRAVENOUS; SUBCUTANEOUS at 11:45

## 2022-12-04 RX ADMIN — MIDODRINE HYDROCHLORIDE 2.5 MG: 5 TABLET ORAL at 08:42

## 2022-12-04 RX ADMIN — LEVOTHYROXINE SODIUM 25 MCG: 25 TABLET ORAL at 05:04

## 2022-12-04 RX ADMIN — MIDODRINE HYDROCHLORIDE 2.5 MG: 5 TABLET ORAL at 17:37

## 2022-12-04 RX ADMIN — ASPIRIN 81 MG: 81 TABLET, COATED ORAL at 08:42

## 2022-12-04 RX ADMIN — INSULIN LISPRO 3 UNITS: 100 INJECTION, SOLUTION INTRAVENOUS; SUBCUTANEOUS at 17:37

## 2022-12-04 RX ADMIN — PREGABALIN 150 MG: 150 CAPSULE ORAL at 08:42

## 2022-12-04 RX ADMIN — INSULIN DETEMIR 20 UNITS: 100 INJECTION, SOLUTION SUBCUTANEOUS at 08:43

## 2022-12-04 RX ADMIN — INSULIN LISPRO 7 UNITS: 100 INJECTION, SOLUTION INTRAVENOUS; SUBCUTANEOUS at 11:45

## 2022-12-04 RX ADMIN — ACETAMINOPHEN 1000 MG: 500 TABLET, FILM COATED ORAL at 20:35

## 2022-12-04 NOTE — PLAN OF CARE
Problem: Adult Inpatient Plan of Care  Goal: Plan of Care Review  Outcome: Ongoing, Progressing  Flowsheets (Taken 12/4/2022 0254)  Progress: improving  Plan of Care Reviewed With: patient  Goal: Absence of Hospital-Acquired Illness or Injury  Outcome: Ongoing, Progressing  Intervention: Identify and Manage Fall Risk  Recent Flowsheet Documentation  Taken 12/4/2022 0205 by Deb Madrid RN  Safety Promotion/Fall Prevention:   activity supervised   assistive device/personal items within reach   clutter free environment maintained   elopement precautions   fall prevention program maintained   gait belt   lighting adjusted   mobility aid in reach   muscle strengthening facilitated   nonskid shoes/slippers when out of bed   room organization consistent   safety round/check completed   toileting scheduled  Taken 12/4/2022 0005 by Deb Madrid RN  Safety Promotion/Fall Prevention:   activity supervised   assistive device/personal items within reach   clutter free environment maintained   elopement precautions   fall prevention program maintained   gait belt   lighting adjusted   mobility aid in reach   muscle strengthening facilitated   nonskid shoes/slippers when out of bed   room organization consistent   safety round/check completed   toileting scheduled  Taken 12/3/2022 2202 by Deb Madrid RN  Safety Promotion/Fall Prevention:   activity supervised   assistive device/personal items within reach   clutter free environment maintained   elopement precautions   fall prevention program maintained   gait belt   muscle strengthening facilitated   mobility aid in reach   lighting adjusted   nonskid shoes/slippers when out of bed   room organization consistent   safety round/check completed   toileting scheduled  Taken 12/3/2022 2000 by Deb Madrid RN  Safety Promotion/Fall Prevention:   activity supervised   assistive device/personal items within reach   elopement precautions   clutter free environment  maintained   fall prevention program maintained   gait belt   lighting adjusted   mobility aid in reach   muscle strengthening facilitated   nonskid shoes/slippers when out of bed   room organization consistent   safety round/check completed   toileting scheduled  Intervention: Prevent Skin Injury  Recent Flowsheet Documentation  Taken 12/4/2022 0205 by Deb Madrid RN  Skin Protection:   adhesive use limited   incontinence pads utilized   tubing/devices free from skin contact  Taken 12/4/2022 0005 by Deb Madrid RN  Skin Protection:   adhesive use limited   incontinence pads utilized   tubing/devices free from skin contact  Taken 12/3/2022 2202 by Deb Madrid RN  Skin Protection:   adhesive use limited   incontinence pads utilized   tubing/devices free from skin contact  Taken 12/3/2022 2000 by Deb Madrid RN  Skin Protection:   adhesive use limited   incontinence pads utilized   tubing/devices free from skin contact  Intervention: Prevent and Manage VTE (Venous Thromboembolism) Risk  Recent Flowsheet Documentation  Taken 12/3/2022 2000 by Deb Madrid RN  VTE Prevention/Management:   left   sequential compression devices on  Goal: Optimal Comfort and Wellbeing  Outcome: Ongoing, Progressing  Intervention: Monitor Pain and Promote Comfort  Recent Flowsheet Documentation  Taken 12/3/2022 2000 by Deb Madrid RN  Pain Management Interventions: medication offered but refused  Intervention: Provide Person-Centered Care  Recent Flowsheet Documentation  Taken 12/3/2022 2000 by Deb Madrid RN  Trust Relationship/Rapport:   care explained   choices provided   emotional support provided   empathic listening provided   questions answered   questions encouraged   reassurance provided   thoughts/feelings acknowledged  Goal: Readiness for Transition of Care  Outcome: Ongoing, Progressing   Goal Outcome Evaluation:  Plan of Care Reviewed With: patient        Progress: improving      Pt rested well this  shift.  No c/o pain  only scheduled tylenols given.  RLE acewrap/wound vac intact.  Immobilizer on.  VSS. RA

## 2022-12-04 NOTE — PLAN OF CARE
Goal Outcome Evaluation:  Plan of Care Reviewed With: patient        Progress: improving  Outcome Evaluation: Pt ambulated 40 feet with RW and min A. Cont d/c recs for IRF.

## 2022-12-04 NOTE — PROGRESS NOTES
"IM progress note      Melchor Hardwick III  [id number]  1965     LOS: 4 days     Attending: Brain Bentley MD    Primary Care Provider: Missy Up APRN      Chief Complaint/Reason for visit:  No chief complaint on file.      Subjective        12/3/22:  Feels okay today.  Pain control is good. No f/c/n/vom/sob.      Objective        Visit Vitals  /62 (BP Location: Left arm, Patient Position: Lying)   Pulse 73   Temp 98.3 °F (36.8 °C) (Oral)   Resp 16   Ht 177.8 cm (70\")   Wt 88.9 kg (196 lb)   SpO2 96%   BMI 28.12 kg/m²     Temp (24hrs), Av.2 °F (36.8 °C), Min:98 °F (36.7 °C), Max:98.3 °F (36.8 °C)      Intake/Output:    Intake/Output Summary (Last 24 hours) at 2022 1225  Last data filed at 2022 1000  Gross per 24 hour   Intake 958 ml   Output 4100 ml   Net -3142 ml        Physical Therapy:      Plan of Care Reviewed With: patient  Progress: improving  Outcome Evaluation: Pt ambulated 40 feet with RW and min A. Cont d/c recs for IRF.                    Physical Exam:     General Appearance:    Alert, cooperative, in no acute distress   Head:    Normocephalic, without obvious abnormality, atraumatic    Lungs:     Normal effort, symmetric chest rise,  clear to      auscultation bilaterally              Heart:    Regular rhythm and normal rate, normal S1 and S2    Abdomen:     Normal bowel sounds, no masses, no organomegaly, soft        non-tender, non-distended, no guarding, no rebound                tenderness   Extremities:  Ace wrapped, knee immobilizer on right knee.    Prevena suction dressing.  Peripheral nerve block catheter is out  Left leg with long boot on, prior partial foot amputation history.      Pulses:   Pulses palpable and equal bilaterally   Skin:   No bleeding, bruising or rash          Results Review:     I reviewed the patient's new clinical results.   Results from last 7 days   Lab Units 22  0431 22  0824 22  1256   WBC 10*3/mm3  --  9.88 5.90 "   HEMOGLOBIN g/dL 7.9* 10.2* 11.1*   HEMATOCRIT % 25.0* 31.6* 36.0*   PLATELETS 10*3/mm3  --  139* 144     Results from last 7 days   Lab Units 12/04/22  0536 12/03/22  0503 12/02/22  0431 12/01/22  0824 11/28/22  1256   SODIUM mmol/L 140 137 138   < > 133*   POTASSIUM mmol/L 4.2 4.0 3.8   < > 3.9   CHLORIDE mmol/L 106 104 103   < > 97*   CO2 mmol/L 28.0 26.0 27.0   < > 23.0   BUN mg/dL 11 12 13   < > 11   CREATININE mg/dL 0.61* 0.57* 0.63*   < > 0.63*   CALCIUM mg/dL 7.9* 7.9* 7.7*   < > 8.5*   BILIRUBIN mg/dL  --   --   --   --  0.4   ALK PHOS U/L  --   --   --   --  294*   ALT (SGPT) U/L  --   --   --   --  27   AST (SGOT) U/L  --   --   --   --  46*   GLUCOSE mg/dL 84 115* 167*   < > 286*    < > = values in this interval not displayed.   TYRESE   •  Indication for TYRESE -to rule out infective endocarditis in a patient with MRSA bacteremia.  •  Findings-  •  Left ventricular systolic function is normal. Estimated left ventricular EF = 60%  •  Left atrial appendage is well visualized and is free of thrombus.  •  Saline test was negative for the evidence of shunt in interatrial septum.  •  The tricuspid valve appeared normal in structure. There was a moderate sized echodense structure seen above  the posterior tricuspid leaflet but not attached to the leaflet. The appearance and location are not typical for regurgitation. This structure could be a normal variant. No evidence of tricuspid valve stenosis or significant regurgitations.  •  Other valves also appear normal in structure with no evidence of stenosis or significant regurgitations.  No vegetations seen on these valves.  •  There is no evidence of pericardial effusion.  •  Comment-  •  In view of presence of MRSA bacteremia, recommend to extend antibiotic therapy for possible infective endocarditis and repeat TYRESE in 3 months.         Latest Reference Range & Units 12/04/22 05:36 12/04/22 07:12 12/04/22 07:31 12/04/22 11:37   Glucose 70 - 130 mg/dL 84 72 94 163  (H)   (H): Data is abnormally high    I reviewed the patient's new imaging including images and reports.        All medications reviewed.   acetaminophen, 1,000 mg, Oral, Q8H  aspirin, 81 mg, Oral, Q12H  ceftaroline, 600 mg, Intravenous, Q12H  insulin detemir, 20 Units, Subcutaneous, Q12H  insulin lispro, 0-7 Units, Subcutaneous, TID AC  Insulin Lispro, 7 Units, Subcutaneous, TID With Meals  levothyroxine, 25 mcg, Oral, Q AM  meloxicam, 15 mg, Oral, Daily  midodrine, 2.5 mg, Oral, TID AC  pantoprazole, 40 mg, Oral, Q AM  polyethylene glycol, 17 g, Oral, Daily  pregabalin, 150 mg, Oral, Q12H  sodium chloride, 10 mL, Intravenous, Q12H  vancomycin, 1,250 mg, Intravenous, Q12H      cyclobenzaprine, 5 mg, TID PRN  dextrose, 25 g, Q15 Min PRN  dextrose, 15 g, Q15 Min PRN  diphenhydrAMINE, 25 mg, Q6H PRN   Or  diphenhydrAMINE, 25 mg, Q6H PRN  glucagon (human recombinant), 1 mg, Q15 Min PRN  HYDROmorphone, 0.5 mg, Q2H PRN   And  naloxone, 0.1 mg, Q5 Min PRN  ketorolac, 15 mg, Q6H PRN  labetalol, 10 mg, Q4H PRN  ondansetron, 4 mg, Q6H PRN   Or  ondansetron, 4 mg, Q6H PRN  oxyCODONE, 10 mg, Q4H PRN  oxyCODONE, 5 mg, Q4H PRN  Pharmacy to dose vancomycin, , Continuous PRN  sodium chloride, 500 mL, TID PRN  sodium chloride, 10 mL, PRN  traMADol, 50 mg, Q8H PRN        Assessment & Plan       S/P right knee implant removal with insertion of antibiotic spacer    Type 2 diabetes mellitus with hyperglycemia, with long-term current use of insulin (HCC)    Infection of right knee (HCC)    Hypothyroid  MRSA sepsis  Chronic low back pain  S/p PICC placement.      Plan  1. PT/OT, weightbearing per protocol.  2. Pain control-prns   3. IS-encouraged  4. DVT proph-mechanicals, aspirin.  5. Bowel regimen  6. Monitor post-op labs  7. DC planning       Hypothyroid  -Continue home Synthroid     DM  - hgb A1c on 11/28/2022 7.3  -Continue home bolus insulin with meals and long-acting insulin twice daily/ doses increased 12/1.   - Accu-Chek AC and  HS with low dose SSI    TYRESE, done, noted, no obvious vegetation.    No infectious focus evident on MRIs.    Antibiotics per ID, Dr. Euceda is following      Watch for adverse drug reactions.  Noted antibiotic regimen changed from daptomycin      Rose Diaz MD  12/04/22  12:25 EST

## 2022-12-04 NOTE — THERAPY TREATMENT NOTE
Patient Name: Melchor Hardwick III  : 1965    MRN: [id number]                              Today's Date: 2022       Admit Date: 2022    Visit Dx:     ICD-10-CM ICD-9-CM   1. Infection of right knee (HCC)  M00.9 686.9     Patient Active Problem List   Diagnosis   • Hyperlipidemia   • Hypertensive disorder   • Obesity   • Type 2 diabetes mellitus with hyperglycemia, with long-term current use of insulin (HCC)   • Gas gangrene of foot (HCC)   • Cellulitis in diabetic foot (HCC)   • Other acute osteomyelitis of left foot (HCC)   • Osteomyelitis (HCC)   • Critical illness myopathy   • Staphylococcal arthritis of right knee (HCC)   • Other cirrhosis of liver (HCC)   • MRSA bacteremia   • Endocarditis of tricuspid valve   • Wound of right buttock   • History of osteomyelitis   • Debility   • Infection of right knee (HCC)   • S/P right knee implant removal with insertion of antibiotic spacer   • Hypothyroid     Past Medical History:   Diagnosis Date   • Diabetes mellitus (HCC)     4 times per day gets checked for sugar at rehab   • Dizzy    • Hyperlipidemia    • Hypertension    • Hypothyroid 2022   • MRSA infection     blood -      • Orthostatic hypotension    • Pressure sore on buttocks     top crack -  sees wound - but now rn take care of it at rehab - minor opening - dime size - pat nurse didnt assess-  pt states getting better   • Pyogenic arthritis of right knee joint, due to unspecified organism (HCC) 2022   • Sepsis (HCC)    • Wears glasses     readers     Past Surgical History:   Procedure Laterality Date   • ABSCESS DRAINAGE  2004    PERINEUM   • AMPUTATION FOOT Left 2022    Procedure: AMPUTATION FOOT;  Surgeon: Addison Colby MD;  Location[facility];  Service: Podiatry;  Laterality: Left;   • INCISION AND DRAINAGE LEG Left 05/10/2022    Procedure: INCISION AND DRAINAGE LOWER EXTREMITY;  Surgeon: Addison Colby MD;  Location: [facility] OR;  Service: Podiatry;  Laterality:  Left;   • KNEE INCISION AND DRAINAGE Right 09/21/2022    Procedure: KNEE INCISION AND DRAINAGE RIGHT;  Surgeon: Brain Bentley MD;  Location:  [facility] OR;  Service: Orthopedics;  Laterality: Right;   • PERIPHERALLY INSERTED CENTRAL CATHETER INSERTION Left    • TOTAL KNEE  PROSTHESIS REMOVAL W/ SPACER INSERTION Right 11/30/2022    Procedure: ANTIBIOTIC SPACER PLACEMENT KNEE - RIGHT;  Surgeon: Brain Bentley MD;  Location:  [facility] OR;  Service: Orthopedics;  Laterality: Right;   • WOUND DEBRIDEMENT Left 05/13/2022    Procedure: DEBRIDEMENT FOOT;  Surgeon: Addison Colby MD;  Location:  [facility] OR;  Service: Podiatry;  Laterality: Left;      General Information     Row Name 12/04/22 0959          Physical Therapy Time and Intention    Document Type therapy note (daily note)  -     Mode of Treatment physical therapy  -     Row Name 12/04/22 0959          General Information    Patient Profile Reviewed yes  -     Existing Precautions/Restrictions fall;brace on at all times;other (see comments);orthostatic hypotension  WBAT R LE, K.I. on at all times, hemovac, nerve catheter, L walking boot for h/o transmetatarsal amputations, orthostatic hypotension-usually wears abdominal binder  -     Row Name 12/04/22 0959          Cognition    Orientation Status (Cognition) oriented x 4  -     Row Name 12/04/22 0959          Safety Issues, Functional Mobility    Safety Issues Affecting Function (Mobility) safety precaution awareness;safety precautions follow-through/compliance;sequencing abilities  -     Impairments Affecting Function (Mobility) balance;endurance/activity tolerance;pain;range of motion (ROM);postural/trunk control;strength  -           User Key  (r) = Recorded By, (t) = Taken By, (c) = Cosigned By    Initials Name Provider Type     Ehsan De La Cruz, PT Physical Therapist               Mobility     Row Name 12/04/22 0959          Bed Mobility    Bed Mobility supine-sit;scooting/bridging  -      Scooting/Bridging [place] (Bed Mobility) modified [place]  -     Supine-Sit [place] (Bed Mobility) modified independence  -     Row Name 12/04/22 0959          Sit-Stand Transfer    Sit-Stand [place] (Transfers) 1 person assist;supervision  -     Assistive Device (Sit-Stand Transfers) walker, front-wheeled  -     Row Name 12/04/22 0959          Gait/Stairs (Locomotion)    [place] Level (Gait) verbal cues;minimum assist (75% patient effort);1 person assist  -     Assistive Device (Gait) walker, front-wheeled  -     Distance in Feet (Gait) 40  -     Deviations/Abnormal Patterns (Gait) right sided deviations;antalgic;stride length decreased;weight shifting decreased;ataxic;yao decreased;gait speed decreased  -     Bilateral Gait Deviations forward flexed posture;heel strike decreased  -     Comment, (Gait/Stairs) Continues to require min A for management of walker with turns. Cues provided for upright posture and improving step length.  -     Row Name 12/04/22 0959          Mobility    Extremity Weight-bearing Status left lower extremity;right lower extremity  -     Left Lower Extremity (Weight-bearing Status) weight-bearing as tolerated (WBAT);other (see comments)  walking boot  -     Right Lower Extremity (Weight-bearing Status) weight-bearing as tolerated (WBAT);other (see comments)  KI AAT x2 weeks  -           User Key  (r) = Recorded By, (t) = Taken By, (c) = Cosigned By    Initials Name Provider Type     Ehsan De La Cruz PT Physical Therapist               Obj/Interventions     Row Name 12/04/22 1001          Balance    Balance Assessment standing static balance;standing dynamic balance  -     Static Standing Balance minimal assist  -     Dynamic Standing Balance minimal assist  -     Position/Device Used, Standing Balance supported;walker, rolling  -     Balance Interventions sitting;standing;sit to stand;supported;static;dynamic;minimal challenge   -           User Key  (r) = Recorded By, (t) = Taken By, (c) = Cosigned By    Initials Name Provider Type     Ehsan De La Cruz, PT Physical Therapist               Goals/Plan    No documentation.                Clinical Impression     Row Name 12/04/22 1002          Pain    Pretreatment Pain Rating 5/10  -     Posttreatment Pain Rating 5/10  -     Pain Location - Side/Orientation Right  -     Pain Location generalized  -     Pain Location - knee  -     Row Name 12/04/22 1002          Plan of Care Review    Plan of Care Reviewed With patient  -     Progress improving  -     Outcome Evaluation Pt ambulated 40 feet with RW and min A. Cont d/c recs for IRF.  -     Row Name 12/04/22 1002          Therapy Assessment/Plan (PT)    Rehab Potential (PT) good, to achieve stated therapy goals  -     Criteria for Skilled Interventions Met (PT) yes;meets criteria;skilled treatment is necessary  -     Therapy Frequency (PT) daily  -[facility] Name 12/04/22 1002          Vital Signs    O2 Delivery Pre Treatment room air  -     O2 Delivery Intra Treatment room air  -     O2 Delivery Post Treatment room air  -     Pre Patient Position Supine  -     Intra Patient Position Standing  -     Post Patient Position Supine  -[facility] Name 12/04/22 1002          Positioning and Restraints    Pre-Treatment Position in bed  -     Post Treatment Position bed  -     In Bed notified nsg;supine;call light within reach;encouraged to call for assist;exit alarm on;with nsg  -           User Key  (r) = Recorded By, (t) = Taken By, (c) = Cosigned By    Initials Name Provider Type     Ehsan De La Cruz, PT Physical Therapist               Outcome Measures     Row Name 12/04/22 1003          How much help from another person do you currently need...    Turning from your back to your side while in flat bed without using bedrails? 4  -     Moving from lying on back to sitting on the side of a flat bed without  bedrails? 4  -JH     Moving to and from a bed to a chair (including a wheelchair)? 3  -JH     Standing up from a chair using your arms (e.g., wheelchair, bedside chair)? 3  -JH     Climbing 3-5 steps with a railing? 2  -JH     To walk in hospital room? 3  -     AM-PAC 6 Clicks Score (PT) 19  -     Highest level of mobility 6 --> Walked 10 steps or more  -     Row Name 12/04/22 1003          Functional Assessment    Outcome Measure Options AM-PAC 6 Clicks Basic Mobility (PT)  -           User Key  (r) = Recorded By, (t) = Taken By, (c) = Cosigned By    Initials Name Provider Type     Ehsan De La Cruz, PT Physical Therapist                             Physical Therapy Education     Title: PT OT SLP Therapies (In Progress)     Topic: Physical Therapy (Done)     Point: Mobility training (Done)     Learning Progress Summary           Patient Acceptance, E,TB, VU by  at 12/4/2022 1003    Acceptance, E,TB, VU by  at 12/3/2022 1137    Acceptance, E, NR by AS at 12/2/2022 1051    Eager, E, VU,DU,NR by  at 12/1/2022 0946    Comment: Educated pt. safety/technique with bed mobility, transfers, ambulation, WB status, use of knee immobilizer, PT POC                   Point: Home exercise program (Done)     Learning Progress Summary           Patient Acceptance, E,TB, VU by  at 12/4/2022 1003    Acceptance, E,TB, VU by  at 12/3/2022 1137    Acceptance, E, NR by AS at 12/2/2022 1051    Eager, E, VU,DU,NR by  at 12/1/2022 0946    Comment: Educated pt. safety/technique with bed mobility, transfers, ambulation, WB status, use of knee immobilizer, PT POC                   Point: Body mechanics (Done)     Learning Progress Summary           Patient Acceptance, E,TB, VU by  at 12/4/2022 1003    Acceptance, E,TB, VU by  at 12/3/2022 1137    Acceptance, E, NR by AS at 12/2/2022 1051    Eager, E, VU,DU,NR by  at 12/1/2022 0946    Comment: Educated pt. safety/technique with bed mobility, transfers, ambulation, WB  status, use of knee immobilizer, PT POC                   Point: Precautions (Done)     Learning Progress Summary           Patient Acceptance, E,TB, VU by  at 12/4/2022 1003    Acceptance, E,TB, VU by  at 12/3/2022 1137    Acceptance, E, NR by AS at 12/2/2022 1051    Eager, E, VU,DU,NR by  at 12/1/2022 0946    Comment: Educated pt. safety/technique with bed mobility, transfers, ambulation, WB status, use of knee immobilizer, PT POC                               User Key     Initials Effective Dates Name Provider Type Discipline    AS 06/16/21 -  Ana Cristian Gutierrez, PTA Physical Therapist Assistant PT     09/22/20 -  Ehsan De La Cruz, PT Physical Therapist PT     06/01/21 -  Susan Quesada PT Physical Therapist PT              PT Recommendation and Plan     Plan of Care Reviewed With: patient  Progress: improving  Outcome Evaluation: Pt ambulated 40 feet with RW and min A. Cont d/c recs for IRF.     Time Calculation:    PT Charges     Row Name 12/04/22 1004             Time Calculation    Start Time 0903  -      PT Received On 12/04/22  -      PT Goal Re-Cert Due Date 12/14/22  -         Time Calculation- PT    Total Timed Code Minutes- PT 11 minute(s)  -         Timed Charges    04850 - Gait Training Minutes  11  -         Total Minutes    Timed Charges Total Minutes 11  -       Total Minutes 11  -            User Key  (r) = Recorded By, (t) = Taken By, (c) = Cosigned By    Initials Name Provider Type     Ehsan De La Cruz, PT Physical Therapist              Therapy Charges for Today     Code Description Service Date Service Provider Modifiers Qty    86049251923 HC GAIT TRAINING EA 15 MIN 12/3/2022 Ehsan De La Cruz, PT GP 1    [id number] HC GAIT TRAINING EA 15 MIN 12/4/2022 Ehsan De La Cruz, PT GP 1          PT G-Codes  Outcome Measure Options: AM-PAC 6 Clicks Basic Mobility (PT)  AM-PAC 6 Clicks Score (PT): 19  AM-PAC 6 Clicks Score (OT): 16  PT Discharge Summary  Anticipated Discharge  Disposition (PT): inpatient rehabilitation facility    Ehsan De La Cruz, PT  12/4/2022

## 2022-12-04 NOTE — PROGRESS NOTES
[facility] Progress Note        Antibiotics:  Anti-Infectives (From admission, onward)    Ordered     Dose/Rate Route Frequency Start Stop    12/02/22 1159  ceftaroline (TEFLARO) 600 mg/100 mL 0.9% NS (mbp)        Ordering Provider: Kushal Balderrama RPH    600 mg Intravenous Every 12 Hours Scheduled 12/02/22 1500 12/09/22 2059    12/02/22 1159  vancomycin 1250 mg/250 mL 0.9% NS IVPB ([facility])        Ordering Provider: Kushal Balderrama RPHALEY    1,250 mg  over 90 Minutes Intravenous Every 12 Hours Scheduled 12/02/22 1245 12/09/22 0859    12/01/22 0933  vancomycin 1750 mg/500 mL 0.9% NS IVPB ([facility])        Ordering Provider: Bryce Euceda MD    20 mg/kg × 88.9 kg  over 120 Minutes Intravenous Once 12/01/22 1030 12/01/22 1310    12/01/22 0919  Pharmacy to dose vancomycin        Ordering Provider: Bryce Euceda MD     Does not apply Continuous PRN 12/01/22 0919 12/29/22 0918    11/30/22 1449  ceFAZolin in dextrose (ANCEF) IVPB solution 2 g        Ordering Provider: Brain Bentley MD    2 g  over 30 Minutes Intravenous Every 8 Hours 11/30/22 1800 12/01/22 0151    11/30/22 0815  ceFAZolin in dextrose (ANCEF) IVPB solution 2 g        Ordering Provider: Brain Bentley MD    2 g  over 30 Minutes Intravenous Once 11/30/22 0817 11/30/22 1045    11/30/22 0815  vancomycin 1250 mg/250 mL 0.9% NS IVPB ([facility])        Ordering Provider: Brain Bentley MD    15 mg/kg × 88.9 kg Intravenous Once 11/30/22 0817 11/30/22 0902          CC: fatigue    HPI:    Patient is a 57 y.o.  Yr old male with history of cirrhosis/diabetes and other comorbidity as outlined below.  History of left foot gas gangrene with osteomyelitis and MRSA bacteremia treated in [place] by Dr. Giordano in May/June 2022.  Family reports left transmetatarsal amputation in approximately 8 weeks IV vancomycin.  Notes from [place] indicate transthoracic echocardiogram no vegetation per Dr. Giordano; he thinks he might of had several weeks of oral antibiotic after that but not  entirely clear.  He is admitted to [facility] September 20 with progressive right knee pain occurring over the past week preceding admission.  He thinks he might of twisted it but ended up with progressive redness/swelling and pain.  No specific blunt force or penetrating trauma.  he was evaluated by orthopedics and taken to the operating room for right knee incision/drainage and concern for septic arthritis per Dr. Bentley; blood cultures had  MRSA.  Had dysuria but urinalysis not consistent with UTI.  No hematuria or pyuria     9/23 with TV echodensity; 9/26/22  TYRESE no vegetation per cardiology; daptomycin maintained, transferred to [place] with care taken over by Dr. Giordano; repeat blood cultures there on October 18 and October 20 and October 22 (dapto STU = 1)  with MRSA; blood cultures on October 24 and November 12 were negative per microbiology.  Repeat blood cultures November 20 with MRSA and daptomycin STU equal to 3, not susceptible per my discussion with microbiology; blood cultures positive again on November 22 but negative November 23. per Dr Giordano, teflaro had been added and concern regarding right knee with increased swelling/pain prompted transitioned back to [place] for further right knee surgery on November 30 by Dr Bentley     **I d/w Dr Giordano; he felt there was an obvious right arm PICC line infection in October with changes at the exit site and concern it was the reason for positive blood cultures at that time.  No catheter tip culture available per micro.  In November, recurrent bacteremia associated with right knee swelling/pain but no other focal symptoms per my discussion with Dr. Giordano; at risk for sequestered/metastatic focus    12/4/22 postop right knee with controlled pain,  dull at present,  Better controlled per nursing overall, nonradiating, worse with movement, better with pain meds and 2 out of 10 in severity at present; he denies any other focal musculoskeletal pain.  " No new back pain but he does relate chronic lumbar pain, dull at present, worse with movement, not progressive and no new weakness or numbness. No myalgia     Left foot with no redness/swelling or pain.  Surgical site healed with no open wound or active drainage. Has a left arm midline     No headache photophobia or neck stiffness.  No nausea vomiting diarrhea or abdominal pain.  No shortness of breath cough or hemoptysis.  No other new skin rash.  No other recent procedures or interventions.    No indwelling pacemaker        ROS:      12/4/22 No f/c/s. No n/v/d. No rash. No new ADR to Abx.     Constitutional-- No Fever, chills or sweats.  Appetite diminished with fatigue.  Heent-- No new vision, hearing or throat complaints.  No epistaxis or oral sores.  Denies odynophagia or dysphagia.  No flashers, floaters or eye pain.   No headache, photophobia or neck stiffness.  CV-- No chest pain, palpitation or syncope  Resp-- No SOB/cough/Hemoptysis  GI- No nausea, vomiting, or diarrhea.  No hematochezia, melena, or hematemesis. Denies jaundice  -- No dysuria, hematuria, or flank pain.  Denies hesitancy, urgency or flank pain.  Lymph- no swollen lymph nodes in neck/axilla or groin.   Heme- No active bruising or bleeding; no Hx of DVT or PE.  MS-- no swelling or pain in the bones or joints of arms/legs.  No new back pain.  Neuro-- No acute focal weakness or numbness in the arms or legs.  No seizures.     Full 12 point review of systems reviewed and negative otherwise for acute complaints, except for above      PE:   /62 (BP Location: Left arm, Patient Position: Lying)   Pulse 73   Temp 98.3 °F (36.8 °C) (Oral)   Resp 16   Ht 177.8 cm (70\")   Wt 88.9 kg (196 lb)   SpO2 96%   BMI 28.12 kg/m²     GENERAL: Awake and alert, in no acute distress.  Chronically ill-appearing  HEENT: Normocephalic, atraumatic.  PERRL. EOMI. No conjunctival injection. No icterus. Oropharynx clear without evidence of thrush or exudate. " No evidence of peridontal disease.    NECK: Supple without nuchal rigidity. No mass.  LYMPH: No cervical, axillary or inguinal lymphadenopathy.  HEART: RRR; soft murmur LSB, rubs, gallops.   LUNGS: Diminished at bases but otherwise clear to auscultation bilaterally without wheezing, rales, rhonchi. Normal respiratory effort. Nonlabored. No dullness.  ABDOMEN: Soft, nontender, nondistended. Positive bowel sounds. No rebound or guarding. NO mass or HSM.  EXT:  No cyanosis, clubbing or edema. No cord.  : Genitalia generally unremarkable.  Without Ortiz catheter.  MSK: FROM without joint effusions noted arms/legs.    SKIN: Warm and dry without cutaneous eruptions on Inspection/palpation.    NEURO: Oriented to PPT. No focal deficits on motor/sensory exam at arms/legs.  PSYCHIATRIC: Normal insight and judgement. Cooperative with PE     Left foot with no redness induration or warmth.  No discrete mass bulge or fluctuance.  No crepitus or bulla.  Prior surgical site healed [facility].  No open wound or active drainage     Right knee postop surgical site covered.  Exposed skin without obvious redness induration or warmth.  No discrete mass bulge or fluctuance.  No crepitus or bulla.     No peripheral stigmata/phenomena of endocarditis     IV without obvious redness or drainage    Laboratory Data    Results from last 7 days   Lab Units 12/02/22  0431 12/01/22  0824 11/28/22  1256   WBC 10*3/mm3  --  9.88 5.90   HEMOGLOBIN g/dL 7.9* 10.2* 11.1*   HEMATOCRIT % 25.0* 31.6* 36.0*   PLATELETS 10*3/mm3  --  139* 144     Results from last 7 days   Lab Units 12/04/22  0536   SODIUM mmol/L 140   POTASSIUM mmol/L 4.2   CHLORIDE mmol/L 106   CO2 mmol/L 28.0   BUN mg/dL 11   CREATININE mg/dL 0.61*   GLUCOSE mg/dL 84   CALCIUM mg/dL 7.9*     Results from last 7 days   Lab Units 11/28/22  1256   ALK PHOS U/L 294*   BILIRUBIN mg/dL 0.4   ALT (SGPT) U/L 27   AST (SGOT) U/L 46*     Results from last 7 days   Lab Units 11/28/22  1256   SED RATE  mm/hr 79*     Results from last 7 days   Lab Units 11/28/22  1256   CRP mg/dL 3.52*       Estimated Creatinine Clearance: 150.1 mL/min (A) (by C-G formula based on SCr of 0.61 mg/dL (L)).      Microbiology:      Radiology:  Imaging Results (Last 72 Hours)     Procedure Component Value Units Date/Time    MRI Lumbar Spine With & Without Contrast [[id number]] Collected: 12/03/22 1740     Updated: 12/03/22 1751    Narrative:      DATE OF EXAM: 12/3/2022 4:24 AM     PROCEDURE: MRI LUMBAR SPINE W WO CONTRAST-     INDICATIONS: Low back pain, infection suspected, positive xray/CT;  M00.9-Pyogenic arthritis, unspecified     COMPARISON: No comparisons available.     TECHNIQUE: Routine magnetic resonance imaging of the lumbar spine was  performed before and after administration of 18 ml of MultiHance  contrast.     FINDINGS:  This exam is extremely limited. Several of the included sequences have  abnormal signal and the signal-to-noise is suboptimal. There is no large  abscess or obvious bone destruction. This exam again is very limited.  There is some fluid signal on the STIR sequences of the disc of L3-4 and  to a greater degree at L4-5. There is also degenerative changes at this  level so this is a nonspecific finding. There are degenerative changes  at the L3-4 and L4-5 levels. I        Impression:      Borderline nondiagnostic exam. This is not felt to be diagnostic for  exclusion of osteomyelitis or discitis. There is no gross abscess.     This report was finalized on 12/3/2022 5:48 PM by Brain Rosado MD.       MRI Foot Left With & Without Contrast [[id number]] Collected: 12/03/22 1736     Updated: 12/03/22 1744    Narrative:        MRI FOOT LEFT W WO CONTRAST    Date of Exam: 12/3/2022 4:22 AM EST    Indication: Bacteremia. Evaluate for osteomyelitis.  Comparison: None available.    Technique:  Routine multiplanar/multisequence sequence images of the left foot were obtained before and after the uneventful administration  "of  18 mL Multihance.      Findings:   There is been amputation of the metatarsals. There is abnormal low T1 and high T2 signal intensity involving the cuboid the cuneiforms the navicular. There is severe joint space narrowing at the mid foot. There is a questionable soft tissue defect along   the dorsal aspect of the interpretation site. However no drainable fluid collection is seen to suggest an abscess. There is increased signal intensity within the musculature of the foot predominantly involving the plantar muscles. There is no tibiotalar   joint effusion. The Achilles tendon is intact. There is a small os trigonum. Ossification thickening of the remaining portions of the plantar fascia. On postcontrast enhanced imaging no significant enhancement is seen.      Impression:      Impression:   There has been amputation of the foot involving the metatarsals. There is heterogeneous low T1 high T2 signal intensity of the cuboid, cuneiforms and navicular bones. However, no definite rim-enhancing or drainable fluid collection or significant   ulceration is seen. Other etiologies such as osteonecrosis are favored over osteomyelitis. Clinical correlation is recommended.    Electronically Signed: Liyah Howard MD    12/3/2022 5:42 PM EST    Workstation ID: [id number]            Impression:       --Acute/recurrent/persistent MRSA bacteremia/septicemia; prior history positive cultures May 2022 and recurrent despite prior  IV vancomycin and left foot surgery in addition to other supportive measures.   TTE with ? TV echodensity prior admit in September and TYRESE no vegetation at that time at [facility];   blood culture positivity again at [facility] in October/November as above.  Daptomycin STU increased to 3 on 11/20 isolate and not sensitive to daptomycin per microbiology.  Discussed on several occasions with Dr. Giordano; had a repeat TYRESE on October 31 with mention of \" moderate size echodense structure above the posterior " "tricuspid leaflet but not attached to the leaflet\" per cardiology there, described potentially as a \"normal variant\" per their note.  High risk for further serious morbidity and other serious sequela including persistent/progressive or recurrent infection, metastatic foci of involvement and other dire consequences;  vancomycin sensitivity maintained albeit with STU =2; adjusted to vancomycin/ceftaroline in light of daptomycin resistance and I asked microbiology     **Blood cultures November 23 negative so far  **repeat TYRESE 12/1 ; no definitive vegetation per cardiology and only mild tricuspid valve regurgitation stable from prior exam per them.  Ill-defined area in the right atrium appeared stable to them. D/w Dr Trent     --Acute right knee pain/septic arthritis with surgical intervention, incision/debridement by Dr. Bentley on September 21.   MRSA+ in the setting of MRSA bacteremia at that time;  Repeat surgery 11/30, d/w Dr Bentley.     --History of cirrhosis by records.  Unclear etiology.   further GI referral/eval per  medicine team     --Diabetes.  You need to tightly control blood sugar to give best chance for healing.;  Described as uncontrolled by medicine team at admission     -- Chronic lumbar back pain for decades.  This is not new, not worse and no new location.  No new exacerbation or new neurologic symptomatology in his extremities.  MRI 12/3 suboptimal per radiology;  may require further imaging depending on his clinical course to help exclude any other sequestered/spinal-paraspinal focus depending on clinical course     --TCP ; monitor     PLAN:       -- IV vancomycin/ceftaroline     -- Check/review labs cultures and scans     -- Partial history per nursing staff     --d/w pharmacy       -- Highly complex set of issues with high risk for further serious morbidity and other serious sequela     -- Discussed with microbiology; they are doing further assessment to blood cultures drawn November " 22     --d/w Dr Giordano and Dr Bentley and Dr Diaz and Dr Trent    --nursing removed  Midline; new PICC line,   No specific new pain or other suppurative changes at the surface at either site.  I discussed with Dr. Trent regarding additional diagnostics for endocarditis such as PET scan; this still may need to be considered as outpatient depending on clinical course.      **MRI's noted without specific focus at Lumbar spine or foot although spinal imaging suboptimal per radiology       Bryce Euceda MD  12/4/2022

## 2022-12-05 LAB
ANION GAP SERPL CALCULATED.3IONS-SCNC: 5 MMOL/L (ref 5–15)
BUN SERPL-MCNC: 11 MG/DL (ref 6–20)
BUN/CREAT SERPL: 19.6 (ref 7–25)
CALCIUM SPEC-SCNC: 8 MG/DL (ref 8.6–10.5)
CHLORIDE SERPL-SCNC: 104 MMOL/L (ref 98–107)
CO2 SERPL-SCNC: 28 MMOL/L (ref 22–29)
CREAT SERPL-MCNC: 0.56 MG/DL (ref 0.76–1.27)
EGFRCR SERPLBLD CKD-EPI 2021: 115 ML/MIN/1.73
GLUCOSE BLDC GLUCOMTR-MCNC: 159 MG/DL (ref 70–130)
GLUCOSE BLDC GLUCOMTR-MCNC: 186 MG/DL (ref 70–130)
GLUCOSE BLDC GLUCOMTR-MCNC: 229 MG/DL (ref 70–130)
GLUCOSE BLDC GLUCOMTR-MCNC: 237 MG/DL (ref 70–130)
GLUCOSE BLDC GLUCOMTR-MCNC: 237 MG/DL (ref 70–130)
GLUCOSE SERPL-MCNC: 198 MG/DL (ref 65–99)
POTASSIUM SERPL-SCNC: 4.2 MMOL/L (ref 3.5–5.2)
SODIUM SERPL-SCNC: 137 MMOL/L (ref 136–145)
VANCOMYCIN SERPL-MCNC: 17.4 MCG/ML (ref 5–40)

## 2022-12-05 PROCEDURE — 63710000001 INSULIN LISPRO (HUMAN) PER 5 UNITS: Performed by: INTERNAL MEDICINE

## 2022-12-05 PROCEDURE — 63710000001 INSULIN LISPRO (HUMAN) PER 5 UNITS: Performed by: NURSE PRACTITIONER

## 2022-12-05 PROCEDURE — 80202 ASSAY OF VANCOMYCIN: CPT

## 2022-12-05 PROCEDURE — 25010000002 VANCOMYCIN 10 G RECONSTITUTED SOLUTION

## 2022-12-05 PROCEDURE — 80048 BASIC METABOLIC PNL TOTAL CA: CPT | Performed by: INTERNAL MEDICINE

## 2022-12-05 PROCEDURE — 97116 GAIT TRAINING THERAPY: CPT

## 2022-12-05 PROCEDURE — 25010000002 CEFTAROLINE FOSAMIL PER 10 MG

## 2022-12-05 PROCEDURE — 63710000001 INSULIN DETEMIR PER 5 UNITS: Performed by: INTERNAL MEDICINE

## 2022-12-05 PROCEDURE — 82962 GLUCOSE BLOOD TEST: CPT

## 2022-12-05 PROCEDURE — 97110 THERAPEUTIC EXERCISES: CPT

## 2022-12-05 RX ADMIN — INSULIN DETEMIR 20 UNITS: 100 INJECTION, SOLUTION SUBCUTANEOUS at 21:17

## 2022-12-05 RX ADMIN — MIDODRINE HYDROCHLORIDE 2.5 MG: 5 TABLET ORAL at 08:19

## 2022-12-05 RX ADMIN — INSULIN DETEMIR 20 UNITS: 100 INJECTION, SOLUTION SUBCUTANEOUS at 08:21

## 2022-12-05 RX ADMIN — INSULIN LISPRO 2 UNITS: 100 INJECTION, SOLUTION INTRAVENOUS; SUBCUTANEOUS at 11:52

## 2022-12-05 RX ADMIN — PANTOPRAZOLE SODIUM 40 MG: 40 TABLET, DELAYED RELEASE ORAL at 05:00

## 2022-12-05 RX ADMIN — Medication 10 ML: at 08:21

## 2022-12-05 RX ADMIN — INSULIN LISPRO 3 UNITS: 100 INJECTION, SOLUTION INTRAVENOUS; SUBCUTANEOUS at 17:09

## 2022-12-05 RX ADMIN — ASPIRIN 81 MG: 81 TABLET, COATED ORAL at 21:17

## 2022-12-05 RX ADMIN — POLYETHYLENE GLYCOL 3350 17 G: 17 POWDER, FOR SOLUTION ORAL at 08:20

## 2022-12-05 RX ADMIN — ACETAMINOPHEN 1000 MG: 500 TABLET, FILM COATED ORAL at 21:17

## 2022-12-05 RX ADMIN — ACETAMINOPHEN 1000 MG: 500 TABLET, FILM COATED ORAL at 13:56

## 2022-12-05 RX ADMIN — CEFTAROLINE FOSAMIL 600 MG: 600 POWDER, FOR SOLUTION INTRAVENOUS at 08:19

## 2022-12-05 RX ADMIN — INSULIN LISPRO 2 UNITS: 100 INJECTION, SOLUTION INTRAVENOUS; SUBCUTANEOUS at 08:20

## 2022-12-05 RX ADMIN — INSULIN LISPRO 7 UNITS: 100 INJECTION, SOLUTION INTRAVENOUS; SUBCUTANEOUS at 11:52

## 2022-12-05 RX ADMIN — LEVOTHYROXINE SODIUM 25 MCG: 25 TABLET ORAL at 05:00

## 2022-12-05 RX ADMIN — MELOXICAM 15 MG: 15 TABLET ORAL at 08:20

## 2022-12-05 RX ADMIN — PREGABALIN 150 MG: 150 CAPSULE ORAL at 08:19

## 2022-12-05 RX ADMIN — INSULIN LISPRO 7 UNITS: 100 INJECTION, SOLUTION INTRAVENOUS; SUBCUTANEOUS at 08:20

## 2022-12-05 RX ADMIN — CEFTAROLINE FOSAMIL 600 MG: 600 POWDER, FOR SOLUTION INTRAVENOUS at 21:17

## 2022-12-05 RX ADMIN — Medication 10 ML: at 21:18

## 2022-12-05 RX ADMIN — PREGABALIN 150 MG: 150 CAPSULE ORAL at 21:17

## 2022-12-05 RX ADMIN — VANCOMYCIN HYDROCHLORIDE 1250 MG: 10 INJECTION, POWDER, LYOPHILIZED, FOR SOLUTION INTRAVENOUS at 21:17

## 2022-12-05 RX ADMIN — MIDODRINE HYDROCHLORIDE 2.5 MG: 5 TABLET ORAL at 11:52

## 2022-12-05 RX ADMIN — INSULIN LISPRO 7 UNITS: 100 INJECTION, SOLUTION INTRAVENOUS; SUBCUTANEOUS at 17:10

## 2022-12-05 RX ADMIN — ASPIRIN 81 MG: 81 TABLET, COATED ORAL at 08:19

## 2022-12-05 RX ADMIN — ACETAMINOPHEN 1000 MG: 500 TABLET, FILM COATED ORAL at 05:00

## 2022-12-05 RX ADMIN — VANCOMYCIN HYDROCHLORIDE 1250 MG: 10 INJECTION, POWDER, LYOPHILIZED, FOR SOLUTION INTRAVENOUS at 09:34

## 2022-12-05 RX ADMIN — MIDODRINE HYDROCHLORIDE 2.5 MG: 5 TABLET ORAL at 17:09

## 2022-12-05 NOTE — PROGRESS NOTES
[facility] Progress Note        Antibiotics:  Anti-Infectives (From admission, onward)    Ordered     Dose/Rate Route Frequency Start Stop    12/02/22 1159  ceftaroline (TEFLARO) 600 mg/100 mL 0.9% NS (mbp)        Ordering Provider: Kushal Balderrama RPH    600 mg Intravenous Every 12 Hours Scheduled 12/02/22 1500 12/09/22 2059    12/02/22 1159  vancomycin 1250 mg/250 mL 0.9% NS IVPB ([facility])        Ordering Provider: Kushal Balderrama RPHALEY    1,250 mg  over 90 Minutes Intravenous Every 12 Hours Scheduled 12/02/22 1245 12/09/22 0859    12/01/22 0933  vancomycin 1750 mg/500 mL 0.9% NS IVPB ([facility])        Ordering Provider: Bryce Euceda MD    20 mg/kg × 88.9 kg  over 120 Minutes Intravenous Once 12/01/22 1030 12/01/22 1310    12/01/22 0919  Pharmacy to dose vancomycin        Ordering Provider: Bryce Euceda MD     Does not apply Continuous PRN 12/01/22 0919 12/29/22 0918    11/30/22 1449  ceFAZolin in dextrose (ANCEF) IVPB solution 2 g        Ordering Provider: Brain Bentley MD    2 g  over 30 Minutes Intravenous Every 8 Hours 11/30/22 1800 12/01/22 0151    11/30/22 0815  ceFAZolin in dextrose (ANCEF) IVPB solution 2 g        Ordering Provider: Brain Bentley MD    2 g  over 30 Minutes Intravenous Once 11/30/22 0817 11/30/22 1045    11/30/22 0815  vancomycin 1250 mg/250 mL 0.9% NS IVPB ([facility])        Ordering Provider: Brain Bentley MD    15 mg/kg × 88.9 kg Intravenous Once 11/30/22 0817 11/30/22 0902          CC: fatigue    HPI:    Patient is a 57 y.o.  Yr old male with history of cirrhosis/diabetes and other comorbidity as outlined below.  History of left foot gas gangrene with osteomyelitis and MRSA bacteremia treated in [place] by Dr. Giordano in May/June 2022.  Family reports left transmetatarsal amputation in approximately 8 weeks IV vancomycin.  Notes from [place] indicate transthoracic echocardiogram no vegetation per Dr. Giordano; he thinks he might of had several weeks of oral antibiotic after that but not  entirely clear.  He is admitted to [facility] September 20 with progressive right knee pain occurring over the past week preceding admission.  He thinks he might of twisted it but ended up with progressive redness/swelling and pain.  No specific blunt force or penetrating trauma.  he was evaluated by orthopedics and taken to the operating room for right knee incision/drainage and concern for septic arthritis per Dr. Bentley; blood cultures had  MRSA.  Had dysuria but urinalysis not consistent with UTI.  No hematuria or pyuria     9/23 with TV echodensity; 9/26/22  TYRESE no vegetation per cardiology; daptomycin maintained, transferred to [place] with care taken over by Dr. Giordano; repeat blood cultures there on October 18 and October 20 and October 22 (dapto STU = 1)  with MRSA; blood cultures on October 24 and November 12 were negative per microbiology.  Repeat blood cultures November 20 with MRSA and daptomycin STU equal to 3, not susceptible per my discussion with microbiology; blood cultures positive again on November 22 but negative November 23. per Dr Giordano, teflaro had been added and concern regarding right knee with increased swelling/pain prompted transitioned back to [place] for further right knee surgery on November 30 by Dr Bentley     **I d/w Dr Giordano; he felt there was an obvious right arm PICC line infection in October with changes at the exit site and concern it was the reason for positive blood cultures at that time.  No catheter tip culture available per micro.  In November, recurrent bacteremia associated with right knee swelling/pain but no other focal symptoms per my discussion with Dr. Giordano; at risk for sequestered/metastatic focus    12/5/22 he denies any new focal pain or symptomatology.postop right knee with controlled pain,  dull at present,  Better controlled per nursing overall, nonradiating, worse with movement, better with pain meds and 2 out of 10 in severity at  "present; he denies any other focal musculoskeletal pain.  No new back pain but he does relate chronic lumbar pain, dull at present, worse with movement, not progressive and no new weakness or numbness. No myalgia     Left foot with no redness/swelling or pain.  Surgical site healed with no open wound or active drainage. Has a left arm midline     No headache photophobia or neck stiffness.  No nausea vomiting diarrhea or abdominal pain.  No shortness of breath cough or hemoptysis.  No other new skin rash.  No other recent procedures or interventions.    No indwelling pacemaker        ROS:      12/5/22 No f/c/s. No n/v/d. No rash. No new ADR to Abx.     Constitutional-- No Fever, chills or sweats.  Appetite diminished with fatigue.  Heent-- No new vision, hearing or throat complaints.  No epistaxis or oral sores.  Denies odynophagia or dysphagia.  No flashers, floaters or eye pain.   No headache, photophobia or neck stiffness.  CV-- No chest pain, palpitation or syncope  Resp-- No SOB/cough/Hemoptysis  GI- No nausea, vomiting, or diarrhea.  No hematochezia, melena, or hematemesis. Denies jaundice  -- No dysuria, hematuria, or flank pain.  Denies hesitancy, urgency or flank pain.  Lymph- no swollen lymph nodes in neck/axilla or groin.   Heme- No active bruising or bleeding; no Hx of DVT or PE.  MS-- no swelling or pain in the bones or joints of arms/legs.  No new back pain.  Neuro-- No acute focal weakness or numbness in the arms or legs.  No seizures.     Full 12 point review of systems reviewed and negative otherwise for acute complaints, except for above      PE:   /61 (BP Location: Left arm, Patient Position: Lying)   Pulse 72   Temp 98 °F (36.7 °C) (Oral)   Resp 14   Ht 177.8 cm (70\")   Wt 88.9 kg (196 lb)   SpO2 97%   BMI 28.12 kg/m²     GENERAL: Awake and alert, in no acute distress.  Chronically ill-appearing  HEENT: Normocephalic, atraumatic.  PERRL. EOMI. No conjunctival injection. No icterus. " Oropharynx clear without evidence of thrush or exudate. No evidence of peridontal disease.    NECK: Supple without nuchal rigidity. No mass.  LYMPH: No cervical, axillary or inguinal lymphadenopathy.  HEART: RRR; soft murmur LSB, rubs, gallops.   LUNGS: Diminished at bases but otherwise clear to auscultation bilaterally without wheezing, rales, rhonchi. Normal respiratory effort. Nonlabored. No dullness.  ABDOMEN: Soft, nontender, nondistended. Positive bowel sounds. No rebound or guarding. NO mass or HSM.  EXT:  No cyanosis, clubbing or edema. No cord.  : Genitalia generally unremarkable.  Without Ortiz catheter.  MSK: FROM without joint effusions noted arms/legs.    SKIN: Warm and dry without cutaneous eruptions on Inspection/palpation.    NEURO: Oriented to PPT. No focal deficits on motor/sensory exam at arms/legs.  PSYCHIATRIC: Normal insight and judgement. Cooperative with PE     Left foot with no redness induration or warmth.  No discrete mass bulge or fluctuance.  No crepitus or bulla.  Prior surgical site healed at [facility].  No open wound or active drainage     Right knee postop surgical site covered.  Exposed skin without obvious redness induration or warmth.  No discrete mass bulge or fluctuance.  No crepitus or bulla.     No peripheral stigmata/phenomena of endocarditis     IV without obvious redness or drainage    Laboratory Data    Results from last 7 days   Lab Units 12/02/22  0431 12/01/22  0824 11/28/22  1256   WBC 10*3/mm3  --  9.88 5.90   HEMOGLOBIN g/dL 7.9* 10.2* 11.1*   HEMATOCRIT % 25.0* 31.6* 36.0*   PLATELETS 10*3/mm3  --  139* 144     Results from last 7 days   Lab Units 12/05/22  0441   SODIUM mmol/L 137   POTASSIUM mmol/L 4.2   CHLORIDE mmol/L 104   CO2 mmol/L 28.0   BUN mg/dL 11   CREATININE mg/dL 0.56*   GLUCOSE mg/dL 198*   CALCIUM mg/dL 8.0*     Results from last 7 days   Lab Units 11/28/22  1256   ALK PHOS U/L 294*   BILIRUBIN mg/dL 0.4   ALT (SGPT) U/L 27   AST (SGOT) U/L 46*      Results from last 7 days   Lab Units 11/28/22  1256   SED RATE mm/hr 79*     Results from last 7 days   Lab Units 11/28/22  1256   CRP mg/dL 3.52*       Estimated Creatinine Clearance: 163.4 mL/min (A) (by C-G formula based on SCr of 0.56 mg/dL (L)).      Microbiology:      Radiology:  Imaging Results (Last 72 Hours)     Procedure Component Value Units Date/Time    MRI Lumbar Spine With & Without Contrast [[id number]] Collected: 12/03/22 1740     Updated: 12/03/22 1751    Narrative:      DATE OF EXAM: 12/3/2022 4:24 AM     PROCEDURE: MRI LUMBAR SPINE W WO CONTRAST-     INDICATIONS: Low back pain, infection suspected, positive xray/CT;  M00.9-Pyogenic arthritis, unspecified     COMPARISON: No comparisons available.     TECHNIQUE: Routine magnetic resonance imaging of the lumbar spine was  performed before and after administration of 18 ml of MultiHance  contrast.     FINDINGS:  This exam is extremely limited. Several of the included sequences have  abnormal signal and the signal-to-noise is suboptimal. There is no large  abscess or obvious bone destruction. This exam again is very limited.  There is some fluid signal on the STIR sequences of the disc of L3-4 and  to a greater degree at L4-5. There is also degenerative changes at this  level so this is a nonspecific finding. There are degenerative changes  at the L3-4 and L4-5 levels. I        Impression:      Borderline nondiagnostic exam. This is not felt to be diagnostic for  exclusion of osteomyelitis or discitis. There is no gross abscess.     This report was finalized on 12/3/2022 5:48 PM by Brain Rosado MD.       MRI Foot Left With & Without Contrast [[id number]] Collected: 12/03/22 1736     Updated: 12/03/22 1744    Narrative:        MRI FOOT LEFT W WO CONTRAST    Date of Exam: 12/3/2022 4:22 AM EST    Indication: Bacteremia. Evaluate for osteomyelitis.  Comparison: None available.    Technique:  Routine multiplanar/multisequence sequence images of the left  foot were obtained before and after the uneventful administration of  18 mL Multihance.      Findings:   There is been amputation of the metatarsals. There is abnormal low T1 and high T2 signal intensity involving the cuboid the cuneiforms the navicular. There is severe joint space narrowing at the mid foot. There is a questionable soft tissue defect along   the dorsal aspect of the interpretation site. However no drainable fluid collection is seen to suggest an abscess. There is increased signal intensity within the musculature of the foot predominantly involving the plantar muscles. There is no tibiotalar   joint effusion. The Achilles tendon is intact. There is a small os trigonum. Ossification thickening of the remaining portions of the plantar fascia. On postcontrast enhanced imaging no significant enhancement is seen.      Impression:      Impression:   There has been amputation of the foot involving the metatarsals. There is heterogeneous low T1 high T2 signal intensity of the cuboid, cuneiforms and navicular bones. However, no definite rim-enhancing or drainable fluid collection or significant   ulceration is seen. Other etiologies such as osteonecrosis are favored over osteomyelitis. Clinical correlation is recommended.    Electronically Signed: Liyah Howard MD    12/3/2022 5:42 PM EST    Workstation ID: [id number]            Impression:       --Acute/recurrent/persistent MRSA bacteremia/septicemia; prior history positive cultures May 2022 and recurrent despite prior  IV vancomycin and left foot surgery in addition to other supportive measures.   TTE with ? TV echodensity prior admit in September and TYRESE no vegetation at that time at [facility];   blood culture positivity again at [facility] in October/November as above.  Daptomycin STU increased to 3 on 11/20 isolate and not sensitive to daptomycin per microbiology.  Discussed on several occasions with Dr. Giordano; had a repeat TYRESE on October 31 with  "mention of \" moderate size echodense structure above the posterior tricuspid leaflet but not attached to the leaflet\" per cardiology there, described potentially as a \"normal variant\" per their note.  High risk for further serious morbidity and other serious sequela including persistent/progressive or recurrent infection, metastatic foci of involvement and other dire consequences;  vancomycin sensitivity maintained albeit with STU =2; adjusted to vancomycin/ceftaroline in light of daptomycin resistance and I asked microbiology     **Blood cultures November 23 negative so far  **repeat TYERSE 12/1 ; no definitive vegetation per cardiology and only mild tricuspid valve regurgitation stable from prior exam per them.  Ill-defined area in the right atrium appeared stable to them. D/w Dr Trent     --Acute right knee pain/septic arthritis with surgical intervention, incision/debridement by Dr. Bentley on September 21.   MRSA+ in the setting of MRSA bacteremia at that time;  Repeat surgery 11/30, d/w Dr Bentley. Cultures November 30 negative     --History of cirrhosis by records.  Unclear etiology.   further GI referral/eval per  medicine team     --Diabetes.  You need to tightly control blood sugar to give best chance for healing.;  Described as uncontrolled by medicine team at admission     -- Chronic lumbar back pain for decades.  This is not new, not worse and no new location.  No new exacerbation or new neurologic symptomatology in his extremities.  MRI 12/3 suboptimal per radiology;  may require further imaging depending on his clinical course to help exclude any other sequestered/spinal-paraspinal focus depending on clinical course     --TCP ; monitor     PLAN:       -- IV vancomycin/ceftaroline     -- Check/review labs cultures and scans     -- Partial history per nursing staff     --d/w pharmacy       -- Highly complex set of issues with high risk for further serious morbidity and other serious sequela     -- Discussed " with microbiology; they are doing further assessment to blood cultures drawn November 22     --d/w Dr Giordano and Dr Bentley and Dr Diaz and Dr Trent    --nursing removed  Midline; new PICC line,   No specific new pain or other suppurative changes at the surface at either site.  I discussed with Dr. Trent regarding additional diagnostics for endocarditis such as PET scan; this still may need to be considered as outpatient depending on clinical course.      **MRI's noted without specific focus at Lumbar spine or foot although spinal imaging suboptimal per radiology; I d/w Dr Giordano with plans for transition back to [facility] there; depending on clinical course/symptomatology, Dr. Giordano will consider white blood cell scan versus repeat imaging to evaluate for potential occult focus.       Bryce Euceda MD  12/5/2022

## 2022-12-05 NOTE — PLAN OF CARE
Goal Outcome Evaluation:  Plan of Care Reviewed With: patient        Progress: improving  Outcome Evaluation: Pt. performed bed mobility with modified independence. He performed sit to stand transfer w/supervision. He ambulated 60' w/front wheeled walker, min assist. Gait limited by dizziness. He tolerated ther-ex well. Will continue to progress as able. Recommend IPR upon discharge.

## 2022-12-05 NOTE — THERAPY TREATMENT NOTE
Patient Name: Melchor Hardwick III  : 1965    MRN: [id number]                              Today's Date: 2022       Admit Date: 2022    Visit Dx:     ICD-10-CM ICD-9-CM   1. Infection of right knee (HCC)  M00.9 686.9     Patient Active Problem List   Diagnosis   • Hyperlipidemia   • Hypertensive disorder   • Obesity   • Type 2 diabetes mellitus with hyperglycemia, with long-term current use of insulin (HCC)   • Gas gangrene of foot (HCC)   • Cellulitis in diabetic foot (HCC)   • Other acute osteomyelitis of left foot (HCC)   • Osteomyelitis (HCC)   • Critical illness myopathy   • Staphylococcal arthritis of right knee (HCC)   • Other cirrhosis of liver (HCC)   • MRSA bacteremia   • Endocarditis of tricuspid valve   • Wound of right buttock   • History of osteomyelitis   • Debility   • Infection of right knee (HCC)   • S/P right knee implant removal with insertion of antibiotic spacer   • Hypothyroid     Past Medical History:   Diagnosis Date   • Diabetes mellitus (HCC)     4 times per day gets checked for sugar at rehab   • Dizzy    • Hyperlipidemia    • Hypertension    • Hypothyroid 2022   • MRSA infection     blood -      • Orthostatic hypotension    • Pressure sore on buttocks     top crack -  sees wound - but now rn take care of it at rehab - minor opening - dime size - pat nurse didnt assess-  pt states getting better   • Pyogenic arthritis of right knee joint, due to unspecified organism (HCC) 2022   • Sepsis (HCC)    • Wears glasses     readers     Past Surgical History:   Procedure Laterality Date   • ABSCESS DRAINAGE  2004    PERINEUM   • AMPUTATION FOOT Left 2022    Procedure: AMPUTATION FOOT;  Surgeon: Addison Colby MD;  Location: [facility];  Service: Podiatry;  Laterality: Left;   • INCISION AND DRAINAGE LEG Left 05/10/2022    Procedure: INCISION AND DRAINAGE LOWER EXTREMITY;  Surgeon: Addison Colby MD;  Location: [facility] OR;  Service: Podiatry;  Laterality:  Left;   • KNEE INCISION AND DRAINAGE Right 09/21/2022    Procedure: KNEE INCISION AND DRAINAGE RIGHT;  Surgeon: Brain Bentley MD;  Location:  [facility] OR;  Service: Orthopedics;  Laterality: Right;   • PERIPHERALLY INSERTED CENTRAL CATHETER INSERTION Left    • TOTAL KNEE  PROSTHESIS REMOVAL W/ SPACER INSERTION Right 11/30/2022    Procedure: ANTIBIOTIC SPACER PLACEMENT KNEE - RIGHT;  Surgeon: Brain Bentley MD;  Location:  [facility] OR;  Service: Orthopedics;  Laterality: Right;   • WOUND DEBRIDEMENT Left 05/13/2022    Procedure: DEBRIDEMENT FOOT;  Surgeon: Addison Colby MD;  Location:  [facility] OR;  Service: Podiatry;  Laterality: Left;      General Information     Row Name 12/05/22 1414          Physical Therapy Time and Intention    Document Type therapy note (daily note)  -     Mode of Treatment physical therapy  -     Row Name 12/05/22 1414          General Information    Patient Profile Reviewed yes  -SS     Existing Precautions/Restrictions fall;brace on at all times;other (see comments);orthostatic hypotension  KI AAT, hemovac, nerve cath, L walking boot for h/o transmet amps, orthostatic hypotension-usually wears abdominal binder  -     Barriers to Rehab medically complex;previous functional deficit  -     Row Name 12/05/22 1414          Cognition    Orientation Status (Cognition) oriented x 4  -SS     Row Name 12/05/22 1414          Safety Issues, Functional Mobility    Safety Issues Affecting Function (Mobility) safety precaution awareness;safety precautions follow-through/compliance;sequencing abilities  -     Impairments Affecting Function (Mobility) balance;endurance/activity tolerance;pain;range of motion (ROM);postural/trunk control;strength  -           User Key  (r) = Recorded By, (t) = Taken By, (c) = Cosigned By    Initials Name Provider Type    SS Susan Quesada PT Physical Therapist               Mobility     Row Name 12/05/22 1418          Bed Mobility    Bed Mobility  supine-sit;scooting/bridging  -     Scooting/Bridging [place] (Bed Mobility) modified independence  -     Supine-Sit [place] (Bed Mobility) modified independence  -     Comment, (Bed Mobility) pt. demonstrates appropriate sequencing and safety awareness  -     Row Name 12/05/22 1418          Sit-Stand Transfer    Sit-Stand [place] (Transfers) supervision;verbal cues  -     Assistive Device (Sit-Stand Transfers) walker, front-wheeled  -     Comment, (Sit-Stand Transfer) VC for hand placement, stepping out RLE, lowering with eccentric control  -     Row Name 12/05/22 1418          Gait/Stairs (Locomotion)    [place] Level (Gait) verbal cues;minimum assist (75% patient effort)  -     Assistive Device (Gait) walker, front-wheeled  -     Distance in Feet (Gait) 60  -SS     Deviations/Abnormal Patterns (Gait) stride length decreased;weight shifting decreased;yao decreased;gait speed decreased;bilateral deviations  -     Bilateral Gait Deviations forward flexed posture;heel strike decreased  -     Comment, (Gait/Stairs) Pt ambulated with a step through gait pattern. VC for upright posture, heel toe gait pattern, weight acceptance through BLE. Gait limited by dizziness.  -     Row Name 12/05/22 1418          Mobility    Extremity Weight-bearing Status left lower extremity;right lower extremity  -SS     Left Lower Extremity (Weight-bearing Status) weight-bearing as tolerated (WBAT);other (see comments)  walking boot  -     Right Lower Extremity (Weight-bearing Status) weight-bearing as tolerated (WBAT);other (see comments)  knee immobilizer  -           User Key  (r) = Recorded By, (t) = Taken By, (c) = Cosigned By    Initials Name Provider Type     Susan Quesada PT Physical Therapist               Obj/Interventions     Row Name 12/05/22 1420          Motor Skills    Therapeutic Exercise hip;knee;ankle  -     Row Name 12/05/22 1420          Hip (Therapeutic Exercise)     Hip (Therapeutic Exercise) strengthening exercise  -     Hip Strengthening (Therapeutic Exercise) bilateral;aBduction;aDduction;left;heel slides;15 repititions  -     Row Name 12/05/22 1420          Knee (Therapeutic Exercise)    Knee (Therapeutic Exercise) isometric exercises;strengthening exercise  -     Knee Isometrics (Therapeutic Exercise) bilateral;quad sets;10 repetitions;5 repetitions  -     Knee Strengthening (Therapeutic Exercise) bilateral;SLR (straight leg raise);15 repititions  -     Row Name 12/05/22 1420          Ankle (Therapeutic Exercise)    Ankle (Therapeutic Exercise) AROM (active range of motion)  -     Ankle AROM (Therapeutic Exercise) bilateral;dorsiflexion;plantarflexion;15 repititions  -     Row Name 12/05/22 1420          Balance    Balance Assessment sitting static balance;sitting dynamic balance;sit to stand dynamic balance;standing static balance;standing dynamic balance  -     Static Sitting Balance supervision  -     Dynamic Sitting Balance supervision  -     Position, Sitting Balance unsupported;sitting edge of bed  -     Sit to Stand Dynamic Balance supervision  -     Static Standing Balance minimal assist  -     Dynamic Standing Balance minimal assist  -     Position/Device Used, Standing Balance supported;walker, front-wheeled  -     Balance Interventions sitting;standing;sit to stand;supported;static;dynamic  -           User Key  (r) = Recorded By, (t) = Taken By, (c) = Cosigned By    Initials Name Provider Type     Susan Quesada PT Physical Therapist               Goals/Plan    No documentation.                Clinical Impression     Row Name 12/05/22 1423          Pain    Pretreatment Pain Rating 0/10 - no pain  -     Posttreatment Pain Rating 0/10 - no pain  -     Pain Intervention(s) Repositioned;Ambulation/increased activity;Elevated  -     Additional Documentation Pain Scale: Numbers Pre/Post-Treatment (Group)  -     Row Name  12/05/22 1423          Plan of Care Review    Plan of Care Reviewed With patient  -SS     Progress improving  -     Outcome Evaluation Pt. performed bed mobility with modified independence. He performed sit to stand transfer w/supervision. He ambulated 60' w/front wheeled walker, min assist. Gait limited by dizziness. He tolerated ther-ex well. Will continue to progress as able. Recommend IPR upon discharge.  -     Row Name 12/05/22 1423          Therapy Assessment/Plan (PT)    Rehab Potential (PT) good, to achieve stated therapy goals  -     Criteria for Skilled Interventions Met (PT) yes;meets criteria;skilled treatment is necessary  -     Therapy Frequency (PT) daily  -     Row Name 12/05/22 1423          Vital Signs    Pre Systolic BP Rehab 123  -SS     Pre Treatment Diastolic BP 61  -SS     Post Systolic BP Rehab 145  -SS     Post Treatment Diastolic BP 70  -SS     Pretreatment Heart Rate (beats/min) 90  -SS     Posttreatment Heart Rate (beats/min) 93  -SS     Pre SpO2 (%) 97  -SS     O2 Delivery Pre Treatment room air  -SS     Post SpO2 (%) 96  -SS     O2 Delivery Post Treatment room air  -SS     Pre Patient Position Supine  -SS     Post Patient Position Sitting  -     Row Name 12/05/22 1423          Positioning and Restraints    Pre-Treatment Position in bed  -     Post Treatment Position chair  -SS     In Chair notified nsg;reclined;call light within reach;encouraged to call for assist;exit alarm on;waffle cushion;R knee immobilizer;legs elevated  -           User Key  (r) = Recorded By, (t) = Taken By, (c) = Cosigned By    Initials Name Provider Type     Susan Quesada, PT Physical Therapist               Outcome Measures     Row Name 12/05/22 1428 12/05/22 0800       How much help from another person do you currently need...    Turning from your back to your side while in flat bed without using bedrails? 3  -SS 4  -AC    Moving from lying on back to sitting on the side of a flat bed  without bedrails? 3  -SS 4  -AC    Moving to and from a bed to a chair (including a wheelchair)? 3  -SS 3  -AC    Standing up from a chair using your arms (e.g., wheelchair, bedside chair)? 3  -SS 3  -AC    Climbing 3-5 steps with a railing? 2  -SS 2  -AC    To walk in hospital room? 3  -SS 3  -AC    AM-PAC 6 Clicks Score (PT) 17  -SS 19  -AC    Highest level of mobility 5 --> Static standing  - 6 --> Walked 10 steps or more  -    Row Name 12/05/22 1428          Functional Assessment    Outcome Measure Options AM-PAC 6 Clicks Basic Mobility (PT)  -           User Key  (r) = Recorded By, (t) = Taken By, (c) = Cosigned By    Initials Name Provider Type    Prabha Fleming RN Registered Nurse    Susan Lemon, PT Physical Therapist                             Physical Therapy Education     Title: PT OT SLP Therapies (In Progress)     Topic: Physical Therapy (Done)     Point: Mobility training (Done)     Learning Progress Summary           Patient Eager, E, VU,DU,NR by  at 12/5/2022 1428    Comment: Reviewed safety/technique with bed mobility, transfers, ambulation, HEP, PT POC    Acceptance, E,TB, VU by  at 12/4/2022 1003    Acceptance, E,TB, VU by  at 12/3/2022 1137    Acceptance, E, NR by AS at 12/2/2022 1051    Eager, E, VU,DU,NR by  at 12/1/2022 0946    Comment: Educated pt. safety/technique with bed mobility, transfers, ambulation, WB status, use of knee immobilizer, PT POC                   Point: Home exercise program (Done)     Learning Progress Summary           Patient Eager, E, VU,DU,NR by  at 12/5/2022 1428    Comment: Reviewed safety/technique with bed mobility, transfers, ambulation, HEP, PT POC    Acceptance, E,TB, VU by  at 12/4/2022 1003    Acceptance, E,TB, VU by  at 12/3/2022 1137    Acceptance, E, NR by AS at 12/2/2022 1051    Eager, E, VU,DU,NR by  at 12/1/2022 0946    Comment: Educated pt. safety/technique with bed mobility, transfers, ambulation, WB status, use of knee  immobilizer, PT POC                   Point: Body mechanics (Done)     Learning Progress Summary           Patient Eager, E, VU,DU,NR by  at 12/5/2022 1428    Comment: Reviewed safety/technique with bed mobility, transfers, ambulation, HEP, PT POC    Acceptance, E,TB, VU by  at 12/4/2022 1003    Acceptance, E,TB, VU by  at 12/3/2022 1137    Acceptance, E, NR by AS at 12/2/2022 1051    Eager, E, VU,DU,NR by  at 12/1/2022 0946    Comment: Educated pt. safety/technique with bed mobility, transfers, ambulation, WB status, use of knee immobilizer, PT POC                   Point: Precautions (Done)     Learning Progress Summary           Patient Eager, E, VU,DU,NR by  at 12/5/2022 1428    Comment: Reviewed safety/technique with bed mobility, transfers, ambulation, HEP, PT POC    Acceptance, E,TB, VU by  at 12/4/2022 1003    Acceptance, E,TB, VU by  at 12/3/2022 1137    Acceptance, E, NR by AS at 12/2/2022 1051    Eager, E, VU,DU,NR by  at 12/1/2022 0946    Comment: Educated pt. safety/technique with bed mobility, transfers, ambulation, WB status, use of knee immobilizer, PT POC                               User Key     Initials Effective Dates Name Provider Type Discipline    AS 06/16/21 -  Ana Cristina Gutierrez, PTA Physical Therapist Assistant PT     09/22/20 -  Ehsan De La Cruz, JANNETH Physical Therapist PT     06/01/21 -  Susan Quesada PT Physical Therapist PT              PT Recommendation and Plan  Planned Therapy Interventions (PT): balance training, bed mobility training, gait training, home exercise program, neuromuscular re-education, patient/family education, postural re-education, strengthening, stretching, transfer training  Plan of Care Reviewed With: patient  Progress: improving  Outcome Evaluation: Pt. performed bed mobility with modified independence. He performed sit to stand transfer w/supervision. He ambulated 60' w/front wheeled walker, min assist. Gait limited by dizziness. He  tolerated ther-ex well. Will continue to progress as able. Recommend IPR upon discharge.     Time Calculation:    PT Charges     Row Name 12/05/22 1429             Time Calculation    Start Time 1105  -SS      Stop Time 1128  -SS      Time Calculation (min) 23 min  -SS      PT Received On 12/05/22  -SS         Time Calculation- PT    Total Timed Code Minutes- PT 23 minute(s)  -SS         Timed Charges    53477 - PT Therapeutic Exercise Minutes 8  -SS      24019 - Gait Training Minutes  13  -SS      78708 - PT Therapeutic Activity Minutes 5  -SS         Total Minutes    Timed Charges Total Minutes 26  -SS       Total Minutes 26  -SS            User Key  (r) = Recorded By, (t) = Taken By, (c) = Cosigned By    Initials Name Provider Type    SS Susan Quesada, PT Physical Therapist              Therapy Charges for Today     Code Description Service Date Service Provider Modifiers Qty    35173482450 HC PT THER PROC EA 15 MIN 12/5/2022 Susan Quesada, PT GP 1    53501085641 HC GAIT TRAINING EA 15 MIN 12/5/2022 Susan Quesada, PT GP 1          PT G-Codes  Outcome Measure Options: AM-PAC 6 Clicks Basic Mobility (PT)  AM-PAC 6 Clicks Score (PT): 17  AM-PAC 6 Clicks Score (OT): 16  PT Discharge Summary  Anticipated Discharge Disposition (PT): inpatient rehabilitation facility    Susan Quesada PT  12/5/2022

## 2022-12-05 NOTE — PROGRESS NOTES
"Pharmacy Consult-Vancomycin Dosing  Melchor Hardwick III is a  57 y.o. male receiving vancomycin therapy.     Indication: Bacteremia, bone/joint infection, endocarditis  Consulting Provider: Dr Euceda  ID Consult: Yes    Goal AUC: 400 - 600 mg/L*hr    Current Antimicrobial Therapy  Vancomycin - pharmacy dosing  Ceftaroline 600mg IV q12h    Allergies  Allergies as of 11/23/2022    (No Known Allergies)       Labs    Results from last 7 days   Lab Units 12/05/22  0441 12/04/22  0536 12/03/22  0503   BUN mg/dL 11 11 12   CREATININE mg/dL 0.56* 0.61* 0.57*       Results from last 7 days   Lab Units 12/01/22  0824 11/28/22  1256   WBC 10*3/mm3 9.88 5.90       Evaluation of Dosing     Last Dose Received in the ED/Outside Facility: 1250mg received as ppx on 11/30 @0902  Is Patient on Dialysis or Renal Replacement: no    Ht - 177.8 cm (70\")  Wt - 88.9 kg (196 lb)    Estimated Creatinine Clearance: 163.4 mL/min (A) (by C-G formula based on SCr of 0.56 mg/dL (L)).    Intake & Output (last 3 days)         12/02 0701  12/03 0700 12/03 0701  12/04 0700 12/04 0701  12/05 0700 12/05 0701  12/06 0700    P.O. 785 740 118     IV Piggyback  350 350     Total Intake(mL/kg) 785 (8.8) 1090 (12.3) 468 (5.3)     Urine (mL/kg/hr) 1650 (0.8) 2950 (1.4) 3000 (1.4)     Drains 20       Stool  0 0     Total Output 1670 2950 3000     Net -884 -[phone] -0502             Stool Unmeasured Occurrence  1 x 2 x             Microbiology and Radiology  Microbiology Results (last 10 days)       Procedure Component Value - Date/Time    Tissue / Bone Culture - Tissue, Knee, Right [[id number]] Collected: 11/30/22 1151    Lab Status: Final result Specimen: Tissue from Knee, Right Updated: 12/03/22 1213     Tissue Culture No growth at 3 days     Gram Stain Few (2+) WBCs seen      No organisms seen    Anaerobic Culture 10 Day Incubation - Tissue, Knee, Right [[id number]]  (Normal) Collected: 11/30/22 1151    Lab Status: Preliminary result Specimen: Tissue from Knee, " Right Updated: 12/03/22 1044     Anaerobic Culture No anaerobes isolated at 3 days    Tissue / Bone Culture - Tissue, Knee, Right [[id number]] Collected: 11/30/22 1149    Lab Status: Final result Specimen: Tissue from Knee, Right Updated: 12/03/22 1213     Tissue Culture No growth at 3 days     Gram Stain Few (2+) WBCs seen      No organisms seen    AFB Culture - Tissue, Knee, Right [[id number]] Collected: 11/30/22 1149    Lab Status: Preliminary result Specimen: Tissue from Knee, Right Updated: 12/01/22 1222     AFB Stain No acid fast bacilli seen on concentrated smear    Anaerobic Culture 10 Day Incubation - Tissue, Knee, Right [[id number]]  (Normal) Collected: 11/30/22 1149    Lab Status: Preliminary result Specimen: Tissue from Knee, Right Updated: 12/03/22 1044     Anaerobic Culture No anaerobes isolated at 3 days    Tissue / Bone Culture - Synovium, Knee, Right [[id number]] Collected: 11/30/22 1140    Lab Status: Final result Specimen: Synovium from Knee, Right Updated: 12/03/22 1212     Tissue Culture No growth at 3 days     Gram Stain Few (2+) WBCs seen      No organisms seen    AFB Culture - Synovium, Knee, Right [[id number]] Collected: 11/30/22 1140    Lab Status: Preliminary result Specimen: Synovium from Knee, Right Updated: 12/01/22 1221     AFB Stain No acid fast bacilli seen on concentrated smear    Anaerobic Culture 10 Day Incubation - Synovium, Knee, Right [[id number]]  (Normal) Collected: 11/30/22 1140    Lab Status: Preliminary result Specimen: Synovium from Knee, Right Updated: 12/03/22 1044     Anaerobic Culture No anaerobes isolated at 3 days    MRSA Screen, PCR (Inpatient) - Swab, Nares [[id number]]  (Normal) Collected: 11/28/22 1256    Lab Status: Final result Specimen: Swab from Nares Updated: 11/28/22 1516     MRSA PCR Negative    Narrative:      The negative predictive value of this diagnostic test is high and should only be used to consider de-escalating anti-MRSA therapy. A positive result may  indicate colonization with MRSA and must be correlated clinically.  MRSA Negative            Reported Vancomycin Levels    Results from last 7 days   Lab Units 12/05/22  0441 12/03/22  0503   VANCOMYCIN RM mcg/mL 17.40 20.00             [facility] AUC Calculation:    Current AUC:   460 mg/L*hr    Predicted Steady State AUC on Current Dose: 471 mg/L*hr  _________________________________    Predicted Steady State AUC on New Dose:   471 mg/L*hr    Assessment/Plan:    Pharmacy to dose vancomycin for bacteremia. Goal AUC: 400-600 mg/L*hr.  Current vancomycin regimen: 1250mg q12h.  Vancomycin level 12/5 represents a therapeutic AUC, continue current dose.  Pharmacy will continue to monitor cultures, sensitivities, renal function, and clinical status, and will adjust regimen as necessary.    Eulogio Verdugo, PharmD  12/5/2022  07:27 EST

## 2022-12-05 NOTE — CASE MANAGEMENT/SOCIAL WORK
Continued Stay Note  [facility]     Patient Name: Melchor Hardwick III  MRN: [id number]  Today's Date: 12/5/2022    Admit Date: 11/30/2022    Plan: Ongoing   Discharge Plan     Row Name 12/05/22 1213       Plan    Plan Ongoing    Patient/Family in Agreement with Plan yes    Plan Comments [facility] liaison with [facility], and will await her answer. Case management will continue to follow.    Final Discharge Disposition Code 30 - still a patient               Discharge Codes    No documentation.               Expected Discharge Date and Time     Expected Discharge Date Expected Discharge Time    Dec 9, 2022             Gavin Collazo RN

## 2022-12-05 NOTE — PLAN OF CARE
Problem: Adult Inpatient Plan of Care  Goal: Plan of Care Review  Outcome: Ongoing, Progressing  Flowsheets (Taken 12/5/2022 0353)  Progress: improving  Plan of Care Reviewed With: patient  Goal: Absence of Hospital-Acquired Illness or Injury  Outcome: Ongoing, Progressing  Intervention: Identify and Manage Fall Risk  Recent Flowsheet Documentation  Taken 12/5/2022 0202 by Deb Madrid RN  Safety Promotion/Fall Prevention:   activity supervised   assistive device/personal items within reach   clutter free environment maintained   elopement precautions   fall prevention program maintained   gait belt   lighting adjusted   mobility aid in reach   muscle strengthening facilitated   nonskid shoes/slippers when out of bed   room organization consistent   safety round/check completed   toileting scheduled  Taken 12/5/2022 0000 by Deb Madrid RN  Safety Promotion/Fall Prevention:   activity supervised   assistive device/personal items within reach   clutter free environment maintained   elopement precautions   fall prevention program maintained   gait belt   lighting adjusted   mobility aid in reach   muscle strengthening facilitated   nonskid shoes/slippers when out of bed   room organization consistent   safety round/check completed   toileting scheduled  Taken 12/4/2022 2205 by Deb Madrid RN  Safety Promotion/Fall Prevention:   activity supervised   assistive device/personal items within reach   clutter free environment maintained   elopement precautions   fall prevention program maintained   gait belt   lighting adjusted   mobility aid in reach   muscle strengthening facilitated   nonskid shoes/slippers when out of bed   room organization consistent   safety round/check completed   toileting scheduled  Taken 12/4/2022 2000 by Deb Madrid RN  Safety Promotion/Fall Prevention:   activity supervised   assistive device/personal items within reach   clutter free environment maintained   elopement  precautions   fall prevention program maintained   gait belt   lighting adjusted   mobility aid in reach   muscle strengthening facilitated   nonskid shoes/slippers when out of bed   room organization consistent   safety round/check completed   toileting scheduled  Intervention: Prevent Skin Injury  Recent Flowsheet Documentation  Taken 12/5/2022 0202 by Deb Madrid RN  Skin Protection:   adhesive use limited   incontinence pads utilized   tubing/devices free from skin contact  Taken 12/5/2022 0000 by Deb Madrid RN  Skin Protection:   adhesive use limited   incontinence pads utilized   tubing/devices free from skin contact  Taken 12/4/2022 2205 by Deb Madrid RN  Skin Protection:   adhesive use limited   incontinence pads utilized   tubing/devices free from skin contact  Taken 12/4/2022 2000 by Deb Madrid RN  Skin Protection:   adhesive use limited   incontinence pads utilized   tubing/devices free from skin contact  Goal: Optimal Comfort and Wellbeing  Outcome: Ongoing, Progressing  Intervention: Monitor Pain and Promote Comfort  Recent Flowsheet Documentation  Taken 12/4/2022 2000 by Deb Madrid RN  Pain Management Interventions: medication offered but refused  Intervention: Provide Person-Centered Care  Recent Flowsheet Documentation  Taken 12/4/2022 2000 by Deb Madrid RN  Trust Relationship/Rapport:   care explained   choices provided   emotional support provided   empathic listening provided   questions answered   questions encouraged   reassurance provided   thoughts/feelings acknowledged  Goal: Readiness for Transition of Care  Outcome: Ongoing, Progressing   Goal Outcome Evaluation:  Plan of Care Reviewed With: patient        Progress: improving    Pt rested well this shift.  No c/o pain .  Scheduled tylenols given.  Right leg acewrap/immobilizer and wound vac. Intact.  Voids without difficulty.  VSS. RA

## 2022-12-05 NOTE — PROGRESS NOTES
"IM progress note      Melchor Hardwick III  [id number]  1965     LOS: 5 days     Attending: Brain Bentley MD    Primary Care Provider: Missy Up APRN      Chief Complaint/Reason for visit:  No chief complaint on file.      Subjective        12/3/22:  Feels okay today.  Pain control is good. No f/c/n/vom/sob.    22:  Feels good. No f/c/n/vom/sob. Good pain control.     Objective        Visit Vitals  /74   Pulse 93   Temp 98 °F (36.7 °C) (Oral)   Resp 16   Ht 177.8 cm (70\")   Wt 88.9 kg (196 lb)   SpO2 97%   BMI 28.12 kg/m²     Temp (24hrs), Av.2 °F (36.8 °C), Min:98 °F (36.7 °C), Max:98.3 °F (36.8 °C)      Intake/Output:    Intake/Output Summary (Last 24 hours) at 2022 1357  Last data filed at 2022 0912  Gross per 24 hour   Intake 816 ml   Output 2450 ml   Net -1634 ml         Physical Exam:     General Appearance:    Alert, cooperative, in no acute distress   Head:    Normocephalic, without obvious abnormality, atraumatic    Lungs:     Normal effort, symmetric chest rise,  clear to      auscultation bilaterally              Heart:    Regular rhythm and normal rate, normal S1 and S2    Abdomen:     Normal bowel sounds, no masses, no organomegaly, soft        non-tender, non-distended, no guarding, no rebound                tenderness   Extremities:  Ace wrapped, knee immobilizer on right knee.    Prevena suction dressing.  Peripheral nerve block catheter is out         Pulses:   Pulses palpable and equal bilaterally   Skin:   No bleeding, bruising or rash          Results Review:     I reviewed the patient's new clinical results.   Results from last 7 days   Lab Units 22  0431 22  0824   WBC 10*3/mm3  --  9.88   HEMOGLOBIN g/dL 7.9* 10.2*   HEMATOCRIT % 25.0* 31.6*   PLATELETS 10*3/mm3  --  139*     Results from last 7 days   Lab Units 22  0441 22  0536 22  0503   SODIUM mmol/L 137 140 137   POTASSIUM mmol/L 4.2 4.2 4.0   CHLORIDE mmol/L 104 106 104 "   CO2 mmol/L 28.0 28.0 26.0   BUN mg/dL 11 11 12   CREATININE mg/dL 0.56* 0.61* 0.57*   CALCIUM mg/dL 8.0* 7.9* 7.9*   GLUCOSE mg/dL 198* 84 115*   TYRESE   •  Indication for TYRESE -to rule out infective endocarditis in a patient with MRSA bacteremia.  •  Findings-  •  Left ventricular systolic function is normal. Estimated left ventricular EF = 60%  •  Left atrial appendage is well visualized and is free of thrombus.  •  Saline test was negative for the evidence of shunt in interatrial septum.  •  The tricuspid valve appeared normal in structure. There was a moderate sized echodense structure seen above  the posterior tricuspid leaflet but not attached to the leaflet. The appearance and location are not typical for regurgitation. This structure could be a normal variant. No evidence of tricuspid valve stenosis or significant regurgitations.  •  Other valves also appear normal in structure with no evidence of stenosis or significant regurgitations.  No vegetations seen on these valves.  •  There is no evidence of pericardial effusion.  •  Comment-  •  In view of presence of MRSA bacteremia, recommend to extend antibiotic therapy for possible infective endocarditis and repeat TYRESE in 3 months.         Latest Reference Range & Units 12/05/22 02:51 12/05/22 04:41 12/05/22 07:45 12/05/22 11:31   Glucose 70 - 130 mg/dL 237 (H) 198 (H) 159 (H) 186 (H)   (H): Data is abnormally high       All medications reviewed.   acetaminophen, 1,000 mg, Oral, Q8H  aspirin, 81 mg, Oral, Q12H  ceftaroline, 600 mg, Intravenous, Q12H  insulin detemir, 20 Units, Subcutaneous, Q12H  insulin lispro, 0-7 Units, Subcutaneous, TID AC  Insulin Lispro, 7 Units, Subcutaneous, TID With Meals  levothyroxine, 25 mcg, Oral, Q AM  meloxicam, 15 mg, Oral, Daily  midodrine, 2.5 mg, Oral, TID AC  pantoprazole, 40 mg, Oral, Q AM  polyethylene glycol, 17 g, Oral, Daily  pregabalin, 150 mg, Oral, Q12H  sodium chloride, 10 mL, Intravenous, Q12H  vancomycin, 1,250  mg, Intravenous, Q12H      cyclobenzaprine, 5 mg, TID PRN  dextrose, 25 g, Q15 Min PRN  dextrose, 15 g, Q15 Min PRN  diphenhydrAMINE, 25 mg, Q6H PRN   Or  diphenhydrAMINE, 25 mg, Q6H PRN  glucagon (human recombinant), 1 mg, Q15 Min PRN  HYDROmorphone, 0.5 mg, Q2H PRN   And  naloxone, 0.1 mg, Q5 Min PRN  labetalol, 10 mg, Q4H PRN  ondansetron, 4 mg, Q6H PRN   Or  ondansetron, 4 mg, Q6H PRN  oxyCODONE, 10 mg, Q4H PRN  oxyCODONE, 5 mg, Q4H PRN  Pharmacy to dose vancomycin, , Continuous PRN  sodium chloride, 500 mL, TID PRN  sodium chloride, 10 mL, PRN  traMADol, 50 mg, Q8H PRN        Assessment & Plan       S/P right knee implant removal with insertion of antibiotic spacer    Type 2 diabetes mellitus with hyperglycemia, with long-term current use of insulin (HCC)    Infection of right knee (HCC)    Hypothyroid  MRSA sepsis  Chronic low back pain  S/p PICC placement.      Plan  1. PT/OT, weightbearing per protocol.  2. Pain control-prns   3. IS-encouraged  4. DVT proph-mechanicals, aspirin.  5. Bowel regimen  6. Monitor post-op labs  7. DC planning, to LTAC at [facility],  is following, referral is sent.        Hypothyroid  -Continue home Synthroid     DM  - hgb A1c on 11/28/2022 7.3  -Continue home bolus insulin with meals and long-acting insulin twice daily/ doses increased 12/1.   - Accu-Chek AC and HS with low dose SSI    TYRESE, done, noted, no obvious vegetation.    No infectious focus evident on MRIs.    Antibiotics per ID, Dr. Euceda is following   Plan on tagged WBC scan here or at [facility].     Watch for adverse drug reactions.  Noted antibiotic regimen changed from daptomycin      Rose Diaz MD  12/05/22  13:57 EST

## 2022-12-06 LAB
ANION GAP SERPL CALCULATED.3IONS-SCNC: 5 MMOL/L (ref 5–15)
BUN SERPL-MCNC: 13 MG/DL (ref 6–20)
BUN/CREAT SERPL: 18.1 (ref 7–25)
CALCIUM SPEC-SCNC: 8 MG/DL (ref 8.6–10.5)
CHLORIDE SERPL-SCNC: 103 MMOL/L (ref 98–107)
CO2 SERPL-SCNC: 28 MMOL/L (ref 22–29)
CREAT SERPL-MCNC: 0.72 MG/DL (ref 0.76–1.27)
EGFRCR SERPLBLD CKD-EPI 2021: 106.6 ML/MIN/1.73
GLUCOSE BLDC GLUCOMTR-MCNC: 114 MG/DL (ref 70–130)
GLUCOSE BLDC GLUCOMTR-MCNC: 165 MG/DL (ref 70–130)
GLUCOSE BLDC GLUCOMTR-MCNC: 179 MG/DL (ref 70–130)
GLUCOSE BLDC GLUCOMTR-MCNC: 251 MG/DL (ref 70–130)
GLUCOSE SERPL-MCNC: 132 MG/DL (ref 65–99)
HCT VFR BLD AUTO: 23.3 % (ref 37.5–51)
HGB BLD-MCNC: 7.3 G/DL (ref 13–17.7)
POTASSIUM SERPL-SCNC: 4.1 MMOL/L (ref 3.5–5.2)
SODIUM SERPL-SCNC: 136 MMOL/L (ref 136–145)

## 2022-12-06 PROCEDURE — 97116 GAIT TRAINING THERAPY: CPT

## 2022-12-06 PROCEDURE — 85014 HEMATOCRIT: CPT | Performed by: INTERNAL MEDICINE

## 2022-12-06 PROCEDURE — 25010000002 CEFTAROLINE FOSAMIL PER 10 MG

## 2022-12-06 PROCEDURE — 25010000002 VANCOMYCIN 10 G RECONSTITUTED SOLUTION: Performed by: INTERNAL MEDICINE

## 2022-12-06 PROCEDURE — 63710000001 INSULIN LISPRO (HUMAN) PER 5 UNITS: Performed by: INTERNAL MEDICINE

## 2022-12-06 PROCEDURE — 85018 HEMOGLOBIN: CPT | Performed by: INTERNAL MEDICINE

## 2022-12-06 PROCEDURE — 82962 GLUCOSE BLOOD TEST: CPT

## 2022-12-06 PROCEDURE — 25010000002 VANCOMYCIN 10 G RECONSTITUTED SOLUTION

## 2022-12-06 PROCEDURE — 80048 BASIC METABOLIC PNL TOTAL CA: CPT | Performed by: INTERNAL MEDICINE

## 2022-12-06 PROCEDURE — 63710000001 INSULIN LISPRO (HUMAN) PER 5 UNITS: Performed by: NURSE PRACTITIONER

## 2022-12-06 PROCEDURE — 97110 THERAPEUTIC EXERCISES: CPT

## 2022-12-06 PROCEDURE — 63710000001 INSULIN DETEMIR PER 5 UNITS: Performed by: INTERNAL MEDICINE

## 2022-12-06 RX ADMIN — PANTOPRAZOLE SODIUM 40 MG: 40 TABLET, DELAYED RELEASE ORAL at 04:53

## 2022-12-06 RX ADMIN — ASPIRIN 81 MG: 81 TABLET, COATED ORAL at 21:33

## 2022-12-06 RX ADMIN — MELOXICAM 15 MG: 15 TABLET ORAL at 09:31

## 2022-12-06 RX ADMIN — VANCOMYCIN HYDROCHLORIDE 1250 MG: 10 INJECTION, POWDER, LYOPHILIZED, FOR SOLUTION INTRAVENOUS at 11:10

## 2022-12-06 RX ADMIN — INSULIN DETEMIR 20 UNITS: 100 INJECTION, SOLUTION SUBCUTANEOUS at 21:33

## 2022-12-06 RX ADMIN — POLYETHYLENE GLYCOL 3350 17 G: 17 POWDER, FOR SOLUTION ORAL at 09:31

## 2022-12-06 RX ADMIN — INSULIN LISPRO 7 UNITS: 100 INJECTION, SOLUTION INTRAVENOUS; SUBCUTANEOUS at 16:36

## 2022-12-06 RX ADMIN — Medication 10 ML: at 21:49

## 2022-12-06 RX ADMIN — ACETAMINOPHEN 1000 MG: 500 TABLET, FILM COATED ORAL at 14:09

## 2022-12-06 RX ADMIN — VANCOMYCIN HYDROCHLORIDE 1250 MG: 10 INJECTION, POWDER, LYOPHILIZED, FOR SOLUTION INTRAVENOUS at 23:24

## 2022-12-06 RX ADMIN — PREGABALIN 150 MG: 150 CAPSULE ORAL at 09:31

## 2022-12-06 RX ADMIN — INSULIN LISPRO 2 UNITS: 100 INJECTION, SOLUTION INTRAVENOUS; SUBCUTANEOUS at 11:49

## 2022-12-06 RX ADMIN — INSULIN LISPRO 2 UNITS: 100 INJECTION, SOLUTION INTRAVENOUS; SUBCUTANEOUS at 16:36

## 2022-12-06 RX ADMIN — CEFTAROLINE FOSAMIL 600 MG: 600 POWDER, FOR SOLUTION INTRAVENOUS at 09:30

## 2022-12-06 RX ADMIN — INSULIN LISPRO 7 UNITS: 100 INJECTION, SOLUTION INTRAVENOUS; SUBCUTANEOUS at 09:30

## 2022-12-06 RX ADMIN — INSULIN LISPRO 7 UNITS: 100 INJECTION, SOLUTION INTRAVENOUS; SUBCUTANEOUS at 11:49

## 2022-12-06 RX ADMIN — ASPIRIN 81 MG: 81 TABLET, COATED ORAL at 09:31

## 2022-12-06 RX ADMIN — CEFTAROLINE FOSAMIL 600 MG: 600 POWDER, FOR SOLUTION INTRAVENOUS at 21:39

## 2022-12-06 RX ADMIN — ACETAMINOPHEN 1000 MG: 500 TABLET, FILM COATED ORAL at 21:33

## 2022-12-06 RX ADMIN — PREGABALIN 150 MG: 150 CAPSULE ORAL at 21:33

## 2022-12-06 RX ADMIN — Medication 10 ML: at 09:32

## 2022-12-06 RX ADMIN — ACETAMINOPHEN 1000 MG: 500 TABLET, FILM COATED ORAL at 04:53

## 2022-12-06 RX ADMIN — MIDODRINE HYDROCHLORIDE 2.5 MG: 5 TABLET ORAL at 11:49

## 2022-12-06 RX ADMIN — LEVOTHYROXINE SODIUM 25 MCG: 25 TABLET ORAL at 04:53

## 2022-12-06 RX ADMIN — INSULIN DETEMIR 20 UNITS: 100 INJECTION, SOLUTION SUBCUTANEOUS at 09:30

## 2022-12-06 RX ADMIN — MIDODRINE HYDROCHLORIDE 2.5 MG: 5 TABLET ORAL at 16:36

## 2022-12-06 RX ADMIN — Medication 10 ML: at 14:10

## 2022-12-06 NOTE — PLAN OF CARE
Patient has been resting with no complaints of pain. VSS.  Wound vac in place. Dressing C/D/I. PICC in place .  Immobilizer in place. Ambulated 100 feet with PT with the use of walker/gaitbelt/immobilizer/cast shoe.  Will coninue to monitor

## 2022-12-06 NOTE — THERAPY TREATMENT NOTE
Patient Name: Melchor Hardwick III  : 1965    MRN: [id number]                              Today's Date: 2022       Admit Date: 2022    Visit Dx:     ICD-10-CM ICD-9-CM   1. Infection of right knee (HCC)  M00.9 686.9     Patient Active Problem List   Diagnosis   • Hyperlipidemia   • Hypertensive disorder   • Obesity   • Type 2 diabetes mellitus with hyperglycemia, with long-term current use of insulin (HCC)   • Gas gangrene of foot (HCC)   • Cellulitis in diabetic foot (HCC)   • Other acute osteomyelitis of left foot (HCC)   • Osteomyelitis (HCC)   • Critical illness myopathy   • Staphylococcal arthritis of right knee (HCC)   • Other cirrhosis of liver (HCC)   • MRSA bacteremia   • Endocarditis of tricuspid valve   • Wound of right buttock   • History of osteomyelitis   • Debility   • Infection of right knee (HCC)   • S/P right knee implant removal with insertion of antibiotic spacer   • Hypothyroid     Past Medical History:   Diagnosis Date   • Diabetes mellitus (HCC)     4 times per day gets checked for sugar at rehab   • Dizzy    • Hyperlipidemia    • Hypertension    • Hypothyroid 2022   • MRSA infection     blood -      • Orthostatic hypotension    • Pressure sore on buttocks     top crack -  sees wound - but now rn take care of it at rehab - minor opening - dime size - pat nurse didnt assess-  pt states getting better   • Pyogenic arthritis of right knee joint, due to unspecified organism (HCC) 2022   • Sepsis (HCC)    • Wears glasses     readers     Past Surgical History:   Procedure Laterality Date   • ABSCESS DRAINAGE  2004    PERINEUM   • AMPUTATION FOOT Left 2022    Procedure: AMPUTATION FOOT;  Surgeon: Addison Colby MD;  Location: [facility];  Service: Podiatry;  Laterality: Left;   • INCISION AND DRAINAGE LEG Left 05/10/2022    Procedure: INCISION AND DRAINAGE LOWER EXTREMITY;  Surgeon: Addison Colby MD;  Location: [facility] OR;  Service: Podiatry;  Laterality:  Left;   • KNEE INCISION AND DRAINAGE Right 09/21/2022    Procedure: KNEE INCISION AND DRAINAGE RIGHT;  Surgeon: Brain Bentley MD;  Location:  [facility] OR;  Service: Orthopedics;  Laterality: Right;   • PERIPHERALLY INSERTED CENTRAL CATHETER INSERTION Left    • TOTAL KNEE  PROSTHESIS REMOVAL W/ SPACER INSERTION Right 11/30/2022    Procedure: ANTIBIOTIC SPACER PLACEMENT KNEE - RIGHT;  Surgeon: Brain Bentley MD;  Location:  [facility] OR;  Service: Orthopedics;  Laterality: Right;   • WOUND DEBRIDEMENT Left 05/13/2022    Procedure: DEBRIDEMENT FOOT;  Surgeon: Addison Colby MD;  Location:  [facility] OR;  Service: Podiatry;  Laterality: Left;      General Information     Row Name 12/06/22 1631          Physical Therapy Time and Intention    Document Type therapy note (daily note)  -     Mode of Treatment physical therapy  -     Row Name 12/06/22 1631          General Information    Patient Profile Reviewed yes  -     Existing Precautions/Restrictions fall;brace on at all times;other (see comments);orthostatic hypotension  KI AAT, hemovac, nerve cath, L walking boot for h/o transmet amps, orthostatic hypotension-usually wears abdominal binder  -     Barriers to Rehab medically complex;previous functional deficit  -     Row Name 12/06/22 1631          Cognition    Orientation Status (Cognition) oriented x 4  -     Row Name 12/06/22 1631          Safety Issues, Functional Mobility    Safety Issues Affecting Function (Mobility) sequencing abilities  -     Impairments Affecting Function (Mobility) endurance/activity tolerance;range of motion (ROM);postural/trunk control;strength  -           User Key  (r) = Recorded By, (t) = Taken By, (c) = Cosigned By    Initials Name Provider Type    SS Susan Quesada PT Physical Therapist               Mobility     Row Name 12/06/22 1632          Bed Mobility    Bed Mobility supine-sit;scooting/bridging;sit-supine  -SS     Scooting/Bridging [place] (Bed Mobility)  modified independence  -SS     Supine-Sit [place] (Bed Mobility) modified independence  -SS     Sit-Supine Page (Bed Mobility) verbal cues;modified independence  -SS     Comment, (Bed Mobility) requires increased time and effort; able to perform w/o physical assist w/heavy reliance on bed rails  -     Row Name 12/06/22 1632          Bed-Chair Transfer    Bed-Chair [place] (Transfers) contact guard;verbal cues  -     Assistive Device (Bed-Chair Transfers) walker, front-wheeled  -     Comment, (Bed-Chair Transfer) VC for sequencing  -     Row Name 12/06/22 1632          Sit-Stand Transfer    Sit-Stand [place] (Transfers) supervision;verbal cues  -     Assistive Device (Sit-Stand Transfers) walker, front-wheeled  -SS     Comment, (Sit-Stand Transfer) VC for stepping out RLE, lowering with eccentric control  -     Row Name 12/06/22 1632          Gait/Stairs (Locomotion)    Page Level (Gait) verbal cues;minimum assist (75% patient effort);contact guard  progressing to CGA w/distance  -     Assistive Device (Gait) walker, front-wheeled  -     Distance in Feet (Gait) 100  -SS     Deviations/Abnormal Patterns (Gait) stride length decreased;weight shifting decreased;yao decreased;gait speed decreased;bilateral deviations  -     Bilateral Gait Deviations forward flexed posture;heel strike decreased  -     Comment, (Gait/Stairs) Pt ambulated with a step through gait pattern. VC for upright posture, heel toe gait pattern, weight acceptance through BLE, decreased shoulder hike. Pt. demonstrates improving gait pattern w/distance. Gait limited by fatigue  -     Row Name 12/06/22 1632          Mobility    Extremity Weight-bearing Status left lower extremity;right lower extremity  -SS     Left Lower Extremity (Weight-bearing Status) weight-bearing as tolerated (WBAT);other (see comments)  walking boot per MT amp  -SS     Right Lower Extremity (Weight-bearing Status)  weight-bearing as tolerated (WBAT);other (see comments)  in knee immobilizer  -           User Key  (r) = Recorded By, (t) = Taken By, (c) = Cosigned By    Initials Name Provider Type     Susan Quesada PT Physical Therapist               Obj/Interventions     Row Name 12/06/22 1634          Motor Skills    Therapeutic Exercise hip;knee;ankle  -     Row Name 12/06/22 1634          Hip (Therapeutic Exercise)    Hip (Therapeutic Exercise) strengthening exercise  -     Hip Strengthening (Therapeutic Exercise) bilateral;aBduction;aDduction;left;heel slides;marching while seated;15 repititions  -     Row Name 12/06/22 1634          Knee (Therapeutic Exercise)    Knee (Therapeutic Exercise) isometric exercises;strengthening exercise  -     Knee Isometrics (Therapeutic Exercise) bilateral;quad sets;10 repetitions;5 repetitions  -     Knee Strengthening (Therapeutic Exercise) bilateral;SLR (straight leg raise);left;LAQ (long arc quad);15 repititions  -     Row Name 12/06/22 1634          Ankle (Therapeutic Exercise)    Ankle (Therapeutic Exercise) AROM (active range of motion)  -     Ankle AROM (Therapeutic Exercise) bilateral;dorsiflexion;plantarflexion;15 repititions  -     Row Name 12/06/22 1634          Balance    Balance Assessment sitting static balance;sitting dynamic balance;sit to stand dynamic balance;standing static balance;standing dynamic balance  -     Static Sitting Balance supervision  -     Dynamic Sitting Balance supervision  -     Position, Sitting Balance unsupported;sitting edge of bed  -     Sit to Stand Dynamic Balance supervision  -     Static Standing Balance contact guard  -     Dynamic Standing Balance contact guard  -     Position/Device Used, Standing Balance supported;walker, front-wheeled  -     Balance Interventions sitting;standing;sit to stand;supported;static;dynamic  -           User Key  (r) = Recorded By, (t) = Taken By, (c) = Cosigned By     Initials Name Provider Type     Susan Quesada, PT Physical Therapist               Goals/Plan    No documentation.                Clinical Impression     Row Name 12/06/22 1636          Pain    Pretreatment Pain Rating 0/10 - no pain  -     Posttreatment Pain Rating 3/10  -     Pain Location - Side/Orientation Right  -     Pain Location generalized  -     Pain Location - knee  -     Pain Intervention(s) Repositioned;Ambulation/increased activity;Elevated  -     Additional Documentation Pain Scale: Numbers Pre/Post-Treatment (Group)  -     Row Name 12/06/22 1636          Plan of Care Review    Plan of Care Reviewed With patient  -     Progress improving  -     Outcome Evaluation Pt. performed bed mobility with modified independence. He performed sit to stand transfer w/supervision. He ambulated 100' w/front wheeled walker, contact guard assist. Gait limited by fatigue. He tolerated ther-ex well. Will continue to progress as able. Recommend IPR upon discharge  -     Row Name 12/06/22 1636          Therapy Assessment/Plan (PT)    Rehab Potential (PT) good, to achieve stated therapy goals  -     Criteria for Skilled Interventions Met (PT) yes;meets criteria;skilled treatment is necessary  -     Therapy Frequency (PT) daily  -     Row Name 12/06/22 1636          Vital Signs    Pre Systolic BP Rehab --  VSS, RN cleared  -     Row Name 12/06/22 1636          Positioning and Restraints    Pre-Treatment Position in bed  -     Post Treatment Position bed  -SS     In Bed notified nsg;fowlers;call light within reach;encouraged to call for assist;exit alarm on;R knee immobilizer;patient within staff view  -           User Key  (r) = Recorded By, (t) = Taken By, (c) = Cosigned By    Initials Name Provider Type     Susan Quesada, PT Physical Therapist               Outcome Measures     Row Name 12/06/22 1637 12/06/22 0931       How much help from another person do you currently need...     Turning from your back to your side while in flat bed without using bedrails? 4  -SS 3  -SC    Moving from lying on back to sitting on the side of a flat bed without bedrails? 4  -SS 3  -SC    Moving to and from a bed to a chair (including a wheelchair)? 3  -SS 3  -SC    Standing up from a chair using your arms (e.g., wheelchair, bedside chair)? 3  -SS 3  -SC    Climbing 3-5 steps with a railing? 2  -SS 2  -SC    To walk in hospital room? 3  -SS 3  -SC    AM-PAC 6 Clicks Score (PT) 19  - 17  -SC    Highest level of mobility 6 --> Walked 10 steps or more  - 5 --> Static standing  -SC    Row Name 12/06/22 1637          Functional Assessment    Outcome Measure Options AM-PAC 6 Clicks Basic Mobility (PT)  -           User Key  (r) = Recorded By, (t) = Taken By, (c) = Cosigned By    Initials Name Provider Type    SC Naye Ace RN Registered Nurse    Susan Lemon, PT Physical Therapist                             Physical Therapy Education     Title: PT OT SLP Therapies (In Progress)     Topic: Physical Therapy (Done)     Point: Mobility training (Done)     Learning Progress Summary           Patient Eager, E, VU,DU,NR by  at 12/6/2022 1637    Comment: Reviewed safety/technique with transfers, ambulation, HEP, PT POC    Eager, E, VU,DU,NR by  at 12/5/2022 1428    Comment: Reviewed safety/technique with bed mobility, transfers, ambulation, HEP, PT POC    Acceptance, E,TB, VU by  at 12/4/2022 1003    Acceptance, E,TB, VU by  at 12/3/2022 1137    Acceptance, E, NR by AS at 12/2/2022 1051    Eager, E, VU,DU,NR by  at 12/1/2022 0946    Comment: Educated pt. safety/technique with bed mobility, transfers, ambulation, WB status, use of knee immobilizer, PT POC                   Point: Home exercise program (Done)     Learning Progress Summary           Patient Eager, E, VU,DU,NR by  at 12/6/2022 1637    Comment: Reviewed safety/technique with transfers, ambulation, HEP, PT POC    Eager, E,  VU,DU,NR by  at 12/5/2022 1428    Comment: Reviewed safety/technique with bed mobility, transfers, ambulation, HEP, PT POC    Acceptance, E,TB, VU by  at 12/4/2022 1003    Acceptance, E,TB, VU by  at 12/3/2022 1137    Acceptance, E, NR by AS at 12/2/2022 1051    Eager, E, VU,DU,NR by  at 12/1/2022 0946    Comment: Educated pt. safety/technique with bed mobility, transfers, ambulation, WB status, use of knee immobilizer, PT POC                   Point: Body mechanics (Done)     Learning Progress Summary           Patient Eager, E, VU,DU,NR by  at 12/6/2022 1637    Comment: Reviewed safety/technique with transfers, ambulation, HEP, PT POC    Eager, E, VU,DU,NR by  at 12/5/2022 1428    Comment: Reviewed safety/technique with bed mobility, transfers, ambulation, HEP, PT POC    Acceptance, E,TB, VU by  at 12/4/2022 1003    Acceptance, E,TB, VU by  at 12/3/2022 1137    Acceptance, E, NR by AS at 12/2/2022 1051    Eager, E, VU,DU,NR by  at 12/1/2022 0946    Comment: Educated pt. safety/technique with bed mobility, transfers, ambulation, WB status, use of knee immobilizer, PT POC                   Point: Precautions (Done)     Learning Progress Summary           Patient Eager, E, VU,DU,NR by  at 12/6/2022 1637    Comment: Reviewed safety/technique with transfers, ambulation, HEP, PT POC    Eager, E, VU,DU,NR by  at 12/5/2022 1428    Comment: Reviewed safety/technique with bed mobility, transfers, ambulation, HEP, PT POC    Acceptance, E,TB, VU by  at 12/4/2022 1003    Acceptance, E,TB, VU by  at 12/3/2022 1137    Acceptance, E, NR by AS at 12/2/2022 1051    Eager, E, VU,DU,NR by  at 12/1/2022 0946    Comment: Educated pt. safety/technique with bed mobility, transfers, ambulation, WB status, use of knee immobilizer, PT POC                               User Key     Initials Effective Dates Name Provider Type Discipline    AS 06/16/21 -  Ana Cristina Gutierrez, PTA Physical Therapist Assistant PT      09/22/20 -  Ehsan De La Cruz, PT Physical Therapist PT    SS 06/01/21 -  Susan Quesada PT Physical Therapist PT              PT Recommendation and Plan  Planned Therapy Interventions (PT): balance training, bed mobility training, gait training, home exercise program, neuromuscular re-education, patient/family education, postural re-education, strengthening, stretching, transfer training  Plan of Care Reviewed With: patient  Progress: improving  Outcome Evaluation: Pt. performed bed mobility with modified independence. He performed sit to stand transfer w/supervision. He ambulated 100' w/front wheeled walker, contact guard assist. Gait limited by fatigue. He tolerated ther-ex well. Will continue to progress as able. Recommend IPR upon discharge     Time Calculation:    PT Charges     Row Name 12/06/22 1638             Time Calculation    Start Time 1525  -SS      Stop Time 1548  -SS      Time Calculation (min) 23 min  -SS      PT Received On 12/06/22  -SS         Time Calculation- PT    Total Timed Code Minutes- PT 23 minute(s)  -SS         Timed Charges    59024 - PT Therapeutic Exercise Minutes 10  -SS      19301 - Gait Training Minutes  13  -SS         Total Minutes    Timed Charges Total Minutes 23  -SS       Total Minutes 23  -SS            User Key  (r) = Recorded By, (t) = Taken By, (c) = Cosigned By    Initials Name Provider Type     Susan Quesada, PT Physical Therapist              Therapy Charges for Today     Code Description Service Date Service Provider Modifiers Qty    41379653350 HC PT THER PROC EA 15 MIN 12/5/2022 Susan Quesada, PT GP 1    97010698846 HC GAIT TRAINING EA 15 MIN 12/5/2022 Susan Quesada, PT GP 1    01264812180 HC PT THER PROC EA 15 MIN 12/6/2022 Susan Quesada, PT GP 1    01554669709 HC GAIT TRAINING EA 15 MIN 12/6/2022 Susan Quesada, PT GP 1          PT G-Codes  Outcome Measure Options: AM-PAC 6 Clicks Basic Mobility (PT)  AM-PAC 6 Clicks Score (PT): 19  AM-PAC 6 Clicks Score  (OT): 16  PT Discharge Summary  Anticipated Discharge Disposition (PT): inpatient rehabilitation facility    Susan Quesada, PT  12/6/2022

## 2022-12-06 NOTE — PROGRESS NOTES
"IM progress note      Melchor Hardwick III  [id number]  1965     LOS: 6 days     Attending: Brain Bentley MD    Primary Care Provider: Missy Up APRN      Chief Complaint/Reason for visit:  No chief complaint on file.      Subjective      Feels ok. No c/o pain , nausea, vomiting. No sob.   Participating with PT, dizziness with ambulation noted yesterday.  tolerating antibiotics.    Objective        Visit Vitals  /71 (BP Location: Left arm, Patient Position: Lying)   Pulse 74   Temp 97.4 °F (36.3 °C) (Axillary)   Resp 18   Ht 177.8 cm (70\")   Wt 88.9 kg (196 lb)   SpO2 95%   BMI 28.12 kg/m²     Temp (24hrs), Av.1 °F (36.7 °C), Min:97.4 °F (36.3 °C), Max:98.5 °F (36.9 °C)      Intake/Output:    Intake/Output Summary (Last 24 hours) at 2022 1615  Last data filed at 2022 1110  Gross per 24 hour   Intake --   Output 2650 ml   Net -2650 ml         Physical Exam:     General Appearance:    Alert, cooperative, in no acute distress   Head:    Normocephalic, without obvious abnormality, atraumatic    Lungs:     Normal effort, symmetric chest rise,  clear to      auscultation bilaterally              Heart:    Regular rhythm and normal rate, normal S1 and S2    Abdomen:     Normal bowel sounds, no masses, no organomegaly, soft        non-tender, non-distended, no guarding, no rebound                tenderness   Extremities:  Ace wrapped, knee immobilizer on right knee.    Prevena suction dressing.  Peripheral nerve block catheter is out         Pulses:   Pulses palpable and equal bilaterally   Skin:   No bleeding, bruising or rash          Results Review:     I reviewed the patient's new clinical results.   Results from last 7 days   Lab Units 2234 22  0824   WBC 10*3/mm3  --   --  9.88   HEMOGLOBIN g/dL 7.3* 7.9* 10.2*   HEMATOCRIT % 23.3* 25.0* 31.6*   PLATELETS 10*3/mm3  --   --  139*     Results from last 7 days   Lab Units 22  0534 22  044 " 12/04/22  0536   SODIUM mmol/L 136 137 140   POTASSIUM mmol/L 4.1 4.2 4.2   CHLORIDE mmol/L 103 104 106   CO2 mmol/L 28.0 28.0 28.0   BUN mg/dL 13 11 11   CREATININE mg/dL 0.72* 0.56* 0.61*   CALCIUM mg/dL 8.0* 8.0* 7.9*   GLUCOSE mg/dL 132* 198* 84   TYRESE   •  Indication for TYRESE -to rule out infective endocarditis in a patient with MRSA bacteremia.  •  Findings-  •  Left ventricular systolic function is normal. Estimated left ventricular EF = 60%  •  Left atrial appendage is well visualized and is free of thrombus.  •  Saline test was negative for the evidence of shunt in interatrial septum.  •  The tricuspid valve appeared normal in structure. There was a moderate sized echodense structure seen above  the posterior tricuspid leaflet but not attached to the leaflet. The appearance and location are not typical for regurgitation. This structure could be a normal variant. No evidence of tricuspid valve stenosis or significant regurgitations.  •  Other valves also appear normal in structure with no evidence of stenosis or significant regurgitations.  No vegetations seen on these valves.  •  There is no evidence of pericardial effusion.  •  Comment-  •  In view of presence of MRSA bacteremia, recommend to extend antibiotic therapy for possible infective endocarditis and repeat TYRESE in 3 months.         Latest Reference Range & Units 12/05/22 20:50 12/06/22 05:34 12/06/22 07:47 12/06/22 11:27   Glucose 70 - 130 mg/dL 237 (H) 132 (H) 114 165 (H)   (H): Data is abnormally high       All medications reviewed.   acetaminophen, 1,000 mg, Oral, Q8H  aspirin, 81 mg, Oral, Q12H  ceftaroline, 600 mg, Intravenous, Q12H  insulin detemir, 20 Units, Subcutaneous, Q12H  insulin lispro, 0-7 Units, Subcutaneous, TID AC  Insulin Lispro, 7 Units, Subcutaneous, TID With Meals  levothyroxine, 25 mcg, Oral, Q AM  meloxicam, 15 mg, Oral, Daily  midodrine, 2.5 mg, Oral, TID AC  pantoprazole, 40 mg, Oral, Q AM  polyethylene glycol, 17 g, Oral,  Daily  pregabalin, 150 mg, Oral, Q12H  sodium chloride, 10 mL, Intravenous, Q12H  vancomycin, 1,250 mg, Intravenous, Q12H      cyclobenzaprine, 5 mg, TID PRN  dextrose, 25 g, Q15 Min PRN  dextrose, 15 g, Q15 Min PRN  diphenhydrAMINE, 25 mg, Q6H PRN   Or  diphenhydrAMINE, 25 mg, Q6H PRN  glucagon (human recombinant), 1 mg, Q15 Min PRN  HYDROmorphone, 0.5 mg, Q2H PRN   And  naloxone, 0.1 mg, Q5 Min PRN  labetalol, 10 mg, Q4H PRN  ondansetron, 4 mg, Q6H PRN   Or  ondansetron, 4 mg, Q6H PRN  oxyCODONE, 10 mg, Q4H PRN  oxyCODONE, 5 mg, Q4H PRN  Pharmacy to dose vancomycin, , Continuous PRN  sodium chloride, 500 mL, TID PRN  sodium chloride, 10 mL, PRN  traMADol, 50 mg, Q8H PRN        Assessment & Plan       S/P right knee implant removal with insertion of antibiotic spacer    Type 2 diabetes mellitus with hyperglycemia, with long-term current use of insulin (HCC)    Infection of right knee (HCC)    Hypothyroid  MRSA sepsis  Chronic low back pain  S/p PICC placement.      Plan  1. PT/OT, weightbearing per protocol.  2. Pain control-prns   3. IS-encouraged  4. DVT proph-mechanicals, aspirin.  5. Bowel regimen  6. Monitor post-op labs  7. DC planning, to [facility],  is following, referral is sent.        Hypothyroid  -Continue home Synthroid     DM  - hgb A1c on 11/28/2022 7.3  -Continue home bolus insulin with meals and long-acting insulin twice daily/ doses increased 12/1.   - Accu-Chek AC and HS with low dose SSI    TYRESE, done, noted, no obvious vegetation.    No infectious focus evident on MRIs.    Antibiotics per ID, Dr. Euceda is following   Plan on tagged WBC scan here or at [facility].     Watch for adverse drug reactions.  Noted antibiotic regimen changed from daptomycin  No adverse drug reactions observed.    Rose Diaz MD  12/06/22  16:15 EST

## 2022-12-06 NOTE — PLAN OF CARE
Goal Outcome Evaluation:  Plan of Care Reviewed With: patient        Progress: improving  Outcome Evaluation: Pt. performed bed mobility with modified independence. He performed sit to stand transfer w/supervision. He ambulated 100' w/front wheeled walker, contact guard assist. Gait limited by fatigue. He tolerated ther-ex well. Will continue to progress as able. Recommend IPR upon discharge

## 2022-12-06 NOTE — PROGRESS NOTES
[facility] Progress Note        Antibiotics:  Anti-Infectives (From admission, onward)    Ordered     Dose/Rate Route Frequency Start Stop    12/02/22 1159  ceftaroline (TEFLARO) 600 mg/100 mL 0.9% NS (mbp)        Ordering Provider: Kushal Balderrama RPH    600 mg Intravenous Every 12 Hours Scheduled 12/02/22 1500 12/09/22 2059    12/02/22 1159  vancomycin 1250 mg/250 mL 0.9% NS IVPB ([facility])        Ordering Provider: Kushal Balderrama RPHALEY    1,250 mg  over 90 Minutes Intravenous Every 12 Hours Scheduled 12/02/22 1245 12/09/22 0859    12/01/22 0933  vancomycin 1750 mg/500 mL 0.9% NS IVPB ([facility])        Ordering Provider: Bryce Euceda MD    20 mg/kg × 88.9 kg  over 120 Minutes Intravenous Once 12/01/22 1030 12/01/22 1310    12/01/22 0919  Pharmacy to dose vancomycin        Ordering Provider: Bryce Euceda MD     Does not apply Continuous PRN 12/01/22 0919 12/29/22 0918    11/30/22 1449  ceFAZolin in dextrose (ANCEF) IVPB solution 2 g        Ordering Provider: Brain Bentley MD    2 g  over 30 Minutes Intravenous Every 8 Hours 11/30/22 1800 12/01/22 0151    11/30/22 0815  ceFAZolin in dextrose (ANCEF) IVPB solution 2 g        Ordering Provider: Brain Bentley MD    2 g  over 30 Minutes Intravenous Once 11/30/22 0817 11/30/22 1045    11/30/22 0815  vancomycin 1250 mg/250 mL 0.9% NS IVPB ([facility])        Ordering Provider: Brain Bentley MD    15 mg/kg × 88.9 kg Intravenous Once 11/30/22 0817 11/30/22 0902          CC: fatigue    HPI:    Patient is a 57 y.o.  Yr old male with history of cirrhosis/diabetes and other comorbidity as outlined below.  History of left foot gas gangrene with osteomyelitis and MRSA bacteremia treated in [place] by Dr. Giordano in May/June 2022.  Family reports left transmetatarsal amputation in approximately 8 weeks IV vancomycin.  Notes from [place] indicate transthoracic echocardiogram no vegetation per Dr. Giordano; he thinks he might of had several weeks of oral antibiotic after that but not  entirely clear.  He is admitted to [facility] September 20 with progressive right knee pain occurring over the past week preceding admission.  He thinks he might of twisted it but ended up with progressive redness/swelling and pain.  No specific blunt force or penetrating trauma.  he was evaluated by orthopedics and taken to the operating room for right knee incision/drainage and concern for septic arthritis per Dr. Bentley; blood cultures had  MRSA.  Had dysuria but urinalysis not consistent with UTI.  No hematuria or pyuria     9/23 with TV echodensity; 9/26/22  TYRESE no vegetation per cardiology; daptomycin maintained, transferred to [place] with care taken over by Dr. Giordano; repeat blood cultures there on October 18 and October 20 and October 22 (dapto STU = 1)  with MRSA; blood cultures on October 24 and November 12 were negative per microbiology.  Repeat blood cultures November 20 with MRSA and daptomycin STU equal to 3, not susceptible per my discussion with microbiology; blood cultures positive again on November 22 but negative November 23. per Dr Giordano, teflaro had been added and concern regarding right knee with increased swelling/pain prompted transitioned back to [place] for further right knee surgery on November 30 by Dr Bentley     **I d/w Dr Giordano; he felt there was an obvious right arm PICC line infection in October with changes at the exit site and concern it was the reason for positive blood cultures at that time.  No catheter tip culture available per micro.  In November, recurrent bacteremia associated with right knee swelling/pain but no other focal symptoms per my discussion with Dr. Giordano; at risk for sequestered/metastatic focus    12/5/22 d/w Dr Giordano and plans to get back to [facility];  He will facilitate additional radiographic workup if needed depending on clinical course    12/6/22 no new distress per nursing staff.he denies any new focal pain or symptomatology.postop right  "knee with controlled pain,  dull at present,  Better controlled per nursing overall, nonradiating, worse with movement, better with pain meds and 2 out of 10 in severity at present; he denies any other focal musculoskeletal pain.  No new back pain but he does relate chronic lumbar pain, dull at present, worse with movement, not progressive and no new weakness or numbness. No myalgia     Left foot with no redness/swelling or pain.  Surgical site healed with no open wound or active drainage. Has a left arm midline     No headache photophobia or neck stiffness.  No nausea vomiting diarrhea or abdominal pain.  No shortness of breath cough or hemoptysis.  No other new skin rash.  No other recent procedures or interventions.    No indwelling pacemaker        ROS:      12/6/22 No f/c/s. No n/v/d. No rash. No new ADR to Abx.     Constitutional-- No Fever, chills or sweats.  Appetite diminished with fatigue.  Heent-- No new vision, hearing or throat complaints.  No epistaxis or oral sores.  Denies odynophagia or dysphagia.  No flashers, floaters or eye pain.   No headache, photophobia or neck stiffness.  CV-- No chest pain, palpitation or syncope  Resp-- No SOB/cough/Hemoptysis  GI- No nausea, vomiting, or diarrhea.  No hematochezia, melena, or hematemesis. Denies jaundice  -- No dysuria, hematuria, or flank pain.  Denies hesitancy, urgency or flank pain.  Lymph- no swollen lymph nodes in neck/axilla or groin.   Heme- No active bruising or bleeding; no Hx of DVT or PE.  MS-- no swelling or pain in the bones or joints of arms/legs.  No new back pain.  Neuro-- No acute focal weakness or numbness in the arms or legs.  No seizures.     Full 12 point review of systems reviewed and negative otherwise for acute complaints, except for above      PE: nursing/out from present  /71 (BP Location: Left arm, Patient Position: Lying)   Pulse 74   Temp 97.4 °F (36.3 °C) (Axillary)   Resp 18   Ht 177.8 cm (70\")   Wt 88.9 kg (196 " lb)   SpO2 95%   BMI 28.12 kg/m²     GENERAL: Awake and alert, in no acute distress.  Chronically ill-appearing  HEENT: Normocephalic, atraumatic.    No conjunctival injection. No icterus. Oropharynx clear without evidence of thrush or exudate. No evidence of peridontal disease.    NECK: Supple without nuchal rigidity. No mass.   HEART: RRR; soft murmur LSB, rubs, gallops.   LUNGS: Diminished at bases but otherwise clear to auscultation bilaterally without wheezing, rales, rhonchi. Normal respiratory effort. Nonlabored. No dullness.  ABDOMEN: Soft, nontender, nondistended. Positive bowel sounds. No rebound or guarding. NO mass or HSM.  EXT:  No cyanosis, clubbing or edema. No cord.   MSK: FROM without joint effusions noted arms/legs.    SKIN: Warm and dry without cutaneous eruptions on Inspection/palpation.    NEURO: Oriented to PPT. No focal deficits on motor/sensory exam at arms/legs.     Left foot with no redness induration or warmth.  No discrete mass bulge or fluctuance.  No crepitus or bulla.  Prior surgical site healed at [facility].  No open wound or active drainage     Right knee postop surgical site covered.  Exposed skin without obvious redness induration or warmth.  No discrete mass bulge or fluctuance.  No crepitus or bulla.     No peripheral stigmata/phenomena of endocarditis     IV without obvious redness or drainage    Laboratory Data    Results from last 7 days   Lab Units 12/06/22  0534 12/02/22  0431 12/01/22  0824   WBC 10*3/mm3  --   --  9.88   HEMOGLOBIN g/dL 7.3* 7.9* 10.2*   HEMATOCRIT % 23.3* 25.0* 31.6*   PLATELETS 10*3/mm3  --   --  139*     Results from last 7 days   Lab Units 12/06/22  0534   SODIUM mmol/L 136   POTASSIUM mmol/L 4.1   CHLORIDE mmol/L 103   CO2 mmol/L 28.0   BUN mg/dL 13   CREATININE mg/dL 0.72*   GLUCOSE mg/dL 132*   CALCIUM mg/dL 8.0*                   Estimated Creatinine Clearance: 127.1 mL/min (A) (by C-G formula based on SCr of 0.72 mg/dL  (L)).      Microbiology:      Radiology:  Imaging Results (Last 72 Hours)     Procedure Component Value Units Date/Time    MRI Lumbar Spine With & Without Contrast [[id number]] Collected: 12/03/22 1740     Updated: 12/03/22 1751    Narrative:      DATE OF EXAM: 12/3/2022 4:24 AM     PROCEDURE: MRI LUMBAR SPINE W WO CONTRAST-     INDICATIONS: Low back pain, infection suspected, positive xray/CT;  M00.9-Pyogenic arthritis, unspecified     COMPARISON: No comparisons available.     TECHNIQUE: Routine magnetic resonance imaging of the lumbar spine was  performed before and after administration of 18 ml of MultiHance  contrast.     FINDINGS:  This exam is extremely limited. Several of the included sequences have  abnormal signal and the signal-to-noise is suboptimal. There is no large  abscess or obvious bone destruction. This exam again is very limited.  There is some fluid signal on the STIR sequences of the disc of L3-4 and  to a greater degree at L4-5. There is also degenerative changes at this  level so this is a nonspecific finding. There are degenerative changes  at the L3-4 and L4-5 levels. I        Impression:      Borderline nondiagnostic exam. This is not felt to be diagnostic for  exclusion of osteomyelitis or discitis. There is no gross abscess.     This report was finalized on 12/3/2022 5:48 PM by Brain Rosado MD.       MRI Foot Left With & Without Contrast [[id number]] Collected: 12/03/22 1736     Updated: 12/03/22 1744    Narrative:        MRI FOOT LEFT W WO CONTRAST    Date of Exam: 12/3/2022 4:22 AM EST    Indication: Bacteremia. Evaluate for osteomyelitis.  Comparison: None available.    Technique:  Routine multiplanar/multisequence sequence images of the left foot were obtained before and after the uneventful administration of  18 mL Multihance.      Findings:   There is been amputation of the metatarsals. There is abnormal low T1 and high T2 signal intensity involving the cuboid the cuneiforms the  "navicular. There is severe joint space narrowing at the mid foot. There is a questionable soft tissue defect along   the dorsal aspect of the interpretation site. However no drainable fluid collection is seen to suggest an abscess. There is increased signal intensity within the musculature of the foot predominantly involving the plantar muscles. There is no tibiotalar   joint effusion. The Achilles tendon is intact. There is a small os trigonum. Ossification thickening of the remaining portions of the plantar fascia. On postcontrast enhanced imaging no significant enhancement is seen.      Impression:      Impression:   There has been amputation of the foot involving the metatarsals. There is heterogeneous low T1 high T2 signal intensity of the cuboid, cuneiforms and navicular bones. However, no definite rim-enhancing or drainable fluid collection or significant   ulceration is seen. Other etiologies such as osteonecrosis are favored over osteomyelitis. Clinical correlation is recommended.    Electronically Signed: Liyah Howard MD    12/3/2022 5:42 PM EST    Workstation ID: [id number]            Impression:       --Acute/recurrent/persistent MRSA bacteremia/septicemia; prior history positive cultures May 2022 and recurrent despite prior  IV vancomycin and left foot surgery in addition to other supportive measures.   TTE with ? TV echodensity prior admit in September and TYRESE no vegetation at that time at [facility];   blood culture positivity again at [facility] in October/November as above.  Daptomycin STU increased to 3 on 11/20 isolate and not sensitive to daptomycin per microbiology.  Discussed on several occasions with Dr. Giordano; had a repeat TYRESE on October 31 with mention of \" moderate size echodense structure above the posterior tricuspid leaflet but not attached to the leaflet\" per cardiology there, described potentially as a \"normal variant\" per their note.  High risk for further serious morbidity and " other serious sequela including persistent/progressive or recurrent infection, metastatic foci of involvement and other dire consequences;  vancomycin sensitivity maintained albeit with STU =2; adjusted to vancomycin/ceftaroline in light of daptomycin resistance and I asked microbiology     **Blood cultures November 23 negative so far  **repeat TYRESE 12/1 ; no definitive vegetation per cardiology and only mild tricuspid valve regurgitation stable from prior exam per them.  Ill-defined area in the right atrium appeared stable to them. D/w Dr Trent; you still may need to consider PET scan and likely to require repeat echocardiography during his course.  I have discussed with Dr. Giordano and he will arrange     --Acute right knee pain/septic arthritis with surgical intervention, incision/debridement by Dr. Bentley on September 21.   MRSA+ in the setting of MRSA bacteremia at that time;  Repeat surgery 11/30, d/w Dr Bentley. Cultures November 30 negative     --History of cirrhosis by records.  Unclear etiology.   further GI referral/eval per  medicine team     --Diabetes.  You need to tightly control blood sugar to give best chance for healing.;  Described as uncontrolled by medicine team at admission     -- Chronic lumbar back pain for decades.  This is not new, not worse and no new location.  No new exacerbation or new neurologic symptomatology in his extremities.  MRI 12/3 suboptimal per radiology;  may require further imaging depending on his clinical course to help exclude any other sequestered/spinal-paraspinal focus depending on clinical course     --TCP ; monitor     PLAN:       -- IV vancomycin/ceftaroline     -- Check/review labs cultures and scans     -- Partial history per nursing staff     --d/w pharmacy       -- Highly complex set of issues with high risk for further serious morbidity and other serious sequela     -- Discussed with microbiology; they are doing further assessment to blood cultures drawn November  22     --d/w Dr Giordano  and Dr Diaz     --  new PICC line,   No specific new pain or other suppurative changes at the surface at either site.  I discussed with Dr. Trent regarding additional diagnostics for endocarditis such as PET scan; this still may need to be considered as outpatient depending on clinical course.  see above.     **MRI's noted without specific focus at Lumbar spine or foot although spinal imaging suboptimal per radiology; I d/w Dr Giordano with plans for transition back to [facility] there; depending on clinical course/symptomatology, Dr. Giordano will consider white blood cell scan versus repeat imaging to evaluate for potential occult focus.    **Copied text in this note has been reviewed and is accurate as of 12/06/22.        Bryce Euceda MD  12/6/2022

## 2022-12-07 LAB
ANION GAP SERPL CALCULATED.3IONS-SCNC: 7 MMOL/L (ref 5–15)
BUN SERPL-MCNC: 13 MG/DL (ref 6–20)
BUN/CREAT SERPL: 18.6 (ref 7–25)
CALCIUM SPEC-SCNC: 7.7 MG/DL (ref 8.6–10.5)
CHLORIDE SERPL-SCNC: 105 MMOL/L (ref 98–107)
CO2 SERPL-SCNC: 27 MMOL/L (ref 22–29)
CREAT SERPL-MCNC: 0.7 MG/DL (ref 0.76–1.27)
EGFRCR SERPLBLD CKD-EPI 2021: 107.5 ML/MIN/1.73
GLUCOSE BLDC GLUCOMTR-MCNC: 123 MG/DL (ref 70–130)
GLUCOSE BLDC GLUCOMTR-MCNC: 124 MG/DL (ref 70–130)
GLUCOSE BLDC GLUCOMTR-MCNC: 217 MG/DL (ref 70–130)
GLUCOSE BLDC GLUCOMTR-MCNC: 249 MG/DL (ref 70–130)
GLUCOSE SERPL-MCNC: 151 MG/DL (ref 65–99)
POTASSIUM SERPL-SCNC: 4.3 MMOL/L (ref 3.5–5.2)
SODIUM SERPL-SCNC: 139 MMOL/L (ref 136–145)

## 2022-12-07 PROCEDURE — 25010000002 CEFTAROLINE FOSAMIL PER 10 MG

## 2022-12-07 PROCEDURE — 63710000001 INSULIN LISPRO (HUMAN) PER 5 UNITS: Performed by: INTERNAL MEDICINE

## 2022-12-07 PROCEDURE — 63710000001 INSULIN LISPRO (HUMAN) PER 5 UNITS: Performed by: NURSE PRACTITIONER

## 2022-12-07 PROCEDURE — 97535 SELF CARE MNGMENT TRAINING: CPT

## 2022-12-07 PROCEDURE — 63710000001 INSULIN DETEMIR PER 5 UNITS: Performed by: INTERNAL MEDICINE

## 2022-12-07 PROCEDURE — 97110 THERAPEUTIC EXERCISES: CPT

## 2022-12-07 PROCEDURE — 25010000002 VANCOMYCIN 10 G RECONSTITUTED SOLUTION: Performed by: INTERNAL MEDICINE

## 2022-12-07 PROCEDURE — 82962 GLUCOSE BLOOD TEST: CPT

## 2022-12-07 PROCEDURE — 80048 BASIC METABOLIC PNL TOTAL CA: CPT | Performed by: INTERNAL MEDICINE

## 2022-12-07 RX ADMIN — MIDODRINE HYDROCHLORIDE 2.5 MG: 5 TABLET ORAL at 11:52

## 2022-12-07 RX ADMIN — Medication 10 ML: at 14:24

## 2022-12-07 RX ADMIN — Medication 10 ML: at 21:04

## 2022-12-07 RX ADMIN — ACETAMINOPHEN 1000 MG: 500 TABLET, FILM COATED ORAL at 14:24

## 2022-12-07 RX ADMIN — CEFTAROLINE FOSAMIL 600 MG: 600 POWDER, FOR SOLUTION INTRAVENOUS at 21:03

## 2022-12-07 RX ADMIN — MIDODRINE HYDROCHLORIDE 2.5 MG: 5 TABLET ORAL at 16:48

## 2022-12-07 RX ADMIN — VANCOMYCIN HYDROCHLORIDE 1250 MG: 10 INJECTION, POWDER, LYOPHILIZED, FOR SOLUTION INTRAVENOUS at 22:56

## 2022-12-07 RX ADMIN — PREGABALIN 150 MG: 150 CAPSULE ORAL at 08:33

## 2022-12-07 RX ADMIN — INSULIN LISPRO 7 UNITS: 100 INJECTION, SOLUTION INTRAVENOUS; SUBCUTANEOUS at 11:52

## 2022-12-07 RX ADMIN — ASPIRIN 81 MG: 81 TABLET, COATED ORAL at 08:33

## 2022-12-07 RX ADMIN — PANTOPRAZOLE SODIUM 40 MG: 40 TABLET, DELAYED RELEASE ORAL at 06:18

## 2022-12-07 RX ADMIN — INSULIN DETEMIR 20 UNITS: 100 INJECTION, SOLUTION SUBCUTANEOUS at 08:33

## 2022-12-07 RX ADMIN — ACETAMINOPHEN 1000 MG: 500 TABLET, FILM COATED ORAL at 06:18

## 2022-12-07 RX ADMIN — MELOXICAM 15 MG: 15 TABLET ORAL at 08:33

## 2022-12-07 RX ADMIN — Medication 10 ML: at 08:33

## 2022-12-07 RX ADMIN — POLYETHYLENE GLYCOL 3350 17 G: 17 POWDER, FOR SOLUTION ORAL at 08:33

## 2022-12-07 RX ADMIN — CEFTAROLINE FOSAMIL 600 MG: 600 POWDER, FOR SOLUTION INTRAVENOUS at 08:30

## 2022-12-07 RX ADMIN — PREGABALIN 150 MG: 150 CAPSULE ORAL at 21:03

## 2022-12-07 RX ADMIN — VANCOMYCIN HYDROCHLORIDE 1250 MG: 10 INJECTION, POWDER, LYOPHILIZED, FOR SOLUTION INTRAVENOUS at 10:53

## 2022-12-07 RX ADMIN — MIDODRINE HYDROCHLORIDE 2.5 MG: 5 TABLET ORAL at 06:18

## 2022-12-07 RX ADMIN — ASPIRIN 81 MG: 81 TABLET, COATED ORAL at 21:03

## 2022-12-07 RX ADMIN — INSULIN LISPRO 7 UNITS: 100 INJECTION, SOLUTION INTRAVENOUS; SUBCUTANEOUS at 08:33

## 2022-12-07 RX ADMIN — ACETAMINOPHEN 1000 MG: 500 TABLET, FILM COATED ORAL at 21:03

## 2022-12-07 RX ADMIN — INSULIN LISPRO 7 UNITS: 100 INJECTION, SOLUTION INTRAVENOUS; SUBCUTANEOUS at 16:48

## 2022-12-07 RX ADMIN — LEVOTHYROXINE SODIUM 25 MCG: 25 TABLET ORAL at 06:18

## 2022-12-07 RX ADMIN — INSULIN LISPRO 3 UNITS: 100 INJECTION, SOLUTION INTRAVENOUS; SUBCUTANEOUS at 16:48

## 2022-12-07 RX ADMIN — INSULIN DETEMIR 20 UNITS: 100 INJECTION, SOLUTION SUBCUTANEOUS at 21:03

## 2022-12-07 NOTE — THERAPY TREATMENT NOTE
Patient Name: Melchor Hardwick III  : 1965    MRN: [id number]                              Today's Date: 2022       Admit Date: 2022    Visit Dx:     ICD-10-CM ICD-9-CM   1. Infection of right knee (HCC)  M00.9 686.9     Patient Active Problem List   Diagnosis   • Hyperlipidemia   • Hypertensive disorder   • Obesity   • Type 2 diabetes mellitus with hyperglycemia, with long-term current use of insulin (HCC)   • Gas gangrene of foot (HCC)   • Cellulitis in diabetic foot (HCC)   • Other acute osteomyelitis of left foot (HCC)   • Osteomyelitis (HCC)   • Critical illness myopathy   • Staphylococcal arthritis of right knee (HCC)   • Other cirrhosis of liver (HCC)   • MRSA bacteremia   • Endocarditis of tricuspid valve   • Wound of right buttock   • History of osteomyelitis   • Debility   • Infection of right knee (HCC)   • S/P right knee implant removal with insertion of antibiotic spacer   • Hypothyroid     Past Medical History:   Diagnosis Date   • Diabetes mellitus (HCC)     4 times per day gets checked for sugar at rehab   • Dizzy    • Hyperlipidemia    • Hypertension    • Hypothyroid 2022   • MRSA infection     blood -      • Orthostatic hypotension    • Pressure sore on buttocks     top crack -  sees wound - but now rn take care of it at rehab - minor opening - dime size - pat nurse didnt assess-  pt states getting better   • Pyogenic arthritis of right knee joint, due to unspecified organism (HCC) 2022   • Sepsis (HCC)    • Wears glasses     readers     Past Surgical History:   Procedure Laterality Date   • ABSCESS DRAINAGE  2004    PERINEUM   • AMPUTATION FOOT Left 2022    Procedure: AMPUTATION FOOT;  Surgeon: Addison Colby MD;  Location: [facility];  Service: Podiatry;  Laterality: Left;   • INCISION AND DRAINAGE LEG Left 05/10/2022    Procedure: INCISION AND DRAINAGE LOWER EXTREMITY;  Surgeon: Addison Colyb MD;  Location: [facility] OR;  Service: Podiatry;  Laterality:  Left;   • KNEE INCISION AND DRAINAGE Right 09/21/2022    Procedure: KNEE INCISION AND DRAINAGE RIGHT;  Surgeon: Brain Bentley MD;  Location:  [facility] OR;  Service: Orthopedics;  Laterality: Right;   • PERIPHERALLY INSERTED CENTRAL CATHETER INSERTION Left    • TOTAL KNEE  PROSTHESIS REMOVAL W/ SPACER INSERTION Right 11/30/2022    Procedure: ANTIBIOTIC SPACER PLACEMENT KNEE - RIGHT;  Surgeon: Brain Bentley MD;  Location:  [facility] OR;  Service: Orthopedics;  Laterality: Right;   • WOUND DEBRIDEMENT Left 05/13/2022    Procedure: DEBRIDEMENT FOOT;  Surgeon: Addison Colby MD;  Location:  [facility] OR;  Service: Podiatry;  Laterality: Left;      General Information     Row Name 12/07/22 1149          Physical Therapy Time and Intention    Document Type therapy note (daily note)  -SC     Mode of Treatment physical therapy  -SC     Row Name 12/07/22 1149          General Information    Patient Profile Reviewed yes  -SC     Existing Precautions/Restrictions --  ok to keep KI off and start knee ROM- see order. abdominal binder to help with BP  -SC     Row Name 12/07/22 1149          Cognition    Orientation Status (Cognition) oriented x 4  -SC     Row Name 12/07/22 1149          Safety Issues, Functional Mobility    Impairments Affecting Function (Mobility) endurance/activity tolerance;range of motion (ROM);postural/trunk control;strength  -SC     Comment, Safety Issues/Impairments (Mobility) alert, following commands  -SC           User Key  (r) = Recorded By, (t) = Taken By, (c) = Cosigned By    Initials Name Provider Type    SC Orion Reyes, PT Physical Therapist               Mobility     Row Name 12/07/22 1150          Bed Mobility    Comment, (Bed Mobility) UIC  -SC     Row Name 12/07/22 1150          Transfers    Comment, (Transfers) STS from recliner wt cues to slide R knee out when sitting. Demonstrated good techniqaue  -SC     Row Name 12/07/22 1150          Sit-Stand Transfer    Sit-Stand [place]  (Transfers) 1 person assist;contact guard;1 person to manage equipment  -SC     Assistive Device (Sit-Stand Transfers) walker, front-wheeled  -SC     Row Name 12/07/22 1150          Gait/Stairs (Locomotion)    [place] Level (Gait) 1 person assist;contact guard;1 person to manage equipment  -SC     Assistive Device (Gait) walker, front-wheeled  -SC     Distance in Feet (Gait) 75  -SC     Deviations/Abnormal Patterns (Gait) stride length decreased;weight shifting decreased;yao decreased;gait speed decreased;bilateral deviations  -SC     Bilateral Gait Deviations forward flexed posture;heel strike decreased  -SC     Comment, (Gait/Stairs) Gt training without knee immobilizer focused on step through gait pattern. Cues for upright posture. Demonstrated good technique . NO buckling noted .  -SC           User Key  (r) = Recorded By, (t) = Taken By, (c) = Cosigned By    Initials Name Provider Type    SC Orion Reyes, PT Physical Therapist               Obj/Interventions     Row Name 12/07/22 1153          Range of Motion Comprehensive    Comment, General Range of Motion 10--45 deg R knee  -[facility] Name 12/07/22 1153          Motor Skills    Therapeutic Exercise knee  -[facility] Name 12/07/22 1153          Knee (Therapeutic Exercise)    Knee (Therapeutic Exercise) isometric exercises;strengthening exercise  -SC     Knee Isometrics (Therapeutic Exercise) right;quad sets;10 repetitions  -SC     Knee Strengthening (Therapeutic Exercise) right;heel slides;SLR (straight leg raise);sitting;supine  -[facility] Name 12/07/22 1153          Balance    Balance Assessment standing dynamic balance  -SC     Dynamic Standing Balance 1-person assist;1 person to manage equipment;contact guard  -SC     Position/Device Used, Standing Balance supported;walker, rolling  -SC           User Key  (r) = Recorded By, (t) = Taken By, (c) = Cosigned By    Initials Name Provider Type    SC Orion Reyes, PT Physical Therapist                Goals/Plan    No documentation.                Clinical Impression     Row Name 12/07/22 1154          Pain    Additional Documentation Pain Scale: FACES Pre/Post-Treatment (Group)  -SC     Row Name 12/07/22 1154          Pain Scale: FACES Pre/Post-Treatment    Pain: FACES Scale, Pretreatment 2-->hurts little bit  -SC     Posttreatment Pain Rating 2-->hurts little bit  -SC     Pain Location - Side/Orientation Right  -SC     Pain Location - knee  -[facility] Name 12/07/22 1154          Plan of Care Review    Plan of Care Reviewed With patient  -SC     Progress improving  -SC     Outcome Evaluation Patient able to ambulate in hallway without  knee immobilizer. No buckling noted. Good effort  -[facility] Name 12/07/22 1154          Therapy Assessment/Plan (PT)    Patient/Family Therapy Goals Statement (PT) go home  -SC     Rehab Potential (PT) good, to achieve stated therapy goals  -SC     Criteria for Skilled Interventions Met (PT) yes;meets criteria;skilled treatment is necessary  -SC     Therapy Frequency (PT) daily  -[facility] Name 12/07/22 1150          Positioning and Restraints    Pre-Treatment Position sitting in chair/recliner  -SC     Post Treatment Position chair  -SC     In Chair notified nsg;reclined;sitting;call light within reach;encouraged to call for assist;exit alarm on  -SC           User Key  (r) = Recorded By, (t) = Taken By, (c) = Cosigned By    Initials Name Provider Type    SC Orion Reyes, PT Physical Therapist               Outcome Measures     Row Name 12/07/22 1156          How much help from another person do you currently need...    Turning from your back to your side while in flat bed without using bedrails? 4  -SC     Moving from lying on back to sitting on the side of a flat bed without bedrails? 4  -SC     Moving to and from a bed to a chair (including a wheelchair)? 3  -SC     Standing up from a chair using your arms (e.g., wheelchair, bedside chair)? 3  -SC     Climbing 3-5  steps with a railing? 2  -SC     To walk in hospital room? 3  -SC     AM-PAC 6 Clicks Score (PT) 19  -SC     Highest level of mobility 6 --> Walked 10 steps or more  -SC     Row Name 12/07/22 1156 12/07/22 1045       Functional Assessment    Outcome Measure Options AM-PAC 6 Clicks Basic Mobility (PT)  -SC AM-PAC 6 Clicks Daily Activity (OT)  -          User Key  (r) = Recorded By, (t) = Taken By, (c) = Cosigned By    Initials Name Provider Type    SC Orion Reyes, PT Physical Therapist     Tonja Amato, OT Occupational Therapist                             Physical Therapy Education     Title: PT OT SLP Therapies (Done)     Topic: Physical Therapy (Done)     Point: Mobility training (Done)     Learning Progress Summary           Patient Eager, E, VU,NR by SC at 12/7/2022 1156    Comment: reviewed ROM exercise    Eager, E, VU,DU,NR by  at 12/6/2022 1637    Comment: Reviewed safety/technique with transfers, ambulation, HEP, PT POC    Eager, E, VU,DU,NR by  at 12/5/2022 1428    Comment: Reviewed safety/technique with bed mobility, transfers, ambulation, HEP, PT POC    Acceptance, E,TB, VU by  at 12/4/2022 1003    Acceptance, E,TB, VU by  at 12/3/2022 1137    Acceptance, E, NR by AS at 12/2/2022 1051    Eager, E, VU,DU,NR by  at 12/1/2022 0946    Comment: Educated pt. safety/technique with bed mobility, transfers, ambulation, WB status, use of knee immobilizer, PT POC                   Point: Home exercise program (Done)     Learning Progress Summary           Patient Eager, E, VU,NR by SC at 12/7/2022 1156    Comment: reviewed ROM exercise    Eager, E, VU,DU,NR by  at 12/6/2022 1637    Comment: Reviewed safety/technique with transfers, ambulation, HEP, PT POC    Eager, E, VU,DU,NR by  at 12/5/2022 1428    Comment: Reviewed safety/technique with bed mobility, transfers, ambulation, HEP, PT POC    Acceptance, E,TB, VU by  at 12/4/2022 1003    Acceptance, E,TB, VU by ANA at 12/3/2022 1131     Acceptance, E, NR by AS at 12/2/2022 1051    Eager, E, VU,DU,NR by  at 12/1/2022 0946    Comment: Educated pt. safety/technique with bed mobility, transfers, ambulation, WB status, use of knee immobilizer, PT POC                   Point: Body mechanics (Done)     Learning Progress Summary           Patient Eager, E, VU,NR by SC at 12/7/2022 1156    Comment: reviewed ROM exercise    Eager, E, VU,DU,NR by  at 12/6/2022 1637    Comment: Reviewed safety/technique with transfers, ambulation, HEP, PT POC    Eager, E, VU,DU,NR by  at 12/5/2022 1428    Comment: Reviewed safety/technique with bed mobility, transfers, ambulation, HEP, PT POC    Acceptance, E,TB, VU by  at 12/4/2022 1003    Acceptance, E,TB, VU by  at 12/3/2022 1137    Acceptance, E, NR by AS at 12/2/2022 1051    Eager, E, VU,DU,NR by  at 12/1/2022 0946    Comment: Educated pt. safety/technique with bed mobility, transfers, ambulation, WB status, use of knee immobilizer, PT POC                   Point: Precautions (Done)     Learning Progress Summary           Patient Eager, E, VU,NR by SC at 12/7/2022 1156    Comment: reviewed ROM exercise    Eager, E, VU,DU,NR by  at 12/6/2022 1637    Comment: Reviewed safety/technique with transfers, ambulation, HEP, PT POC    Eager, E, VU,DU,NR by  at 12/5/2022 1428    Comment: Reviewed safety/technique with bed mobility, transfers, ambulation, HEP, PT POC    Acceptance, E,TB, VU by  at 12/4/2022 1003    Acceptance, E,TB, VU by  at 12/3/2022 1137    Acceptance, E, NR by AS at 12/2/2022 1051    Eager, E, VU,DU,NR by  at 12/1/2022 0946    Comment: Educated pt. safety/technique with bed mobility, transfers, ambulation, WB status, use of knee immobilizer, PT POC                               User Key     Initials Effective Dates Name Provider Type Discipline    SC 06/16/21 -  Orion Reyes, PT Physical Therapist PT    AS 06/16/21 -  Ana Cristina Gutierrez, LIZETT Physical Therapist Assistant PT     09/22/20  -  Ehsan De La Cruz, PT Physical Therapist PT    SS 06/01/21 -  Susan Quesada, PT Physical Therapist PT              PT Recommendation and Plan     Plan of Care Reviewed With: patient  Progress: improving  Outcome Evaluation: Patient able to ambulate in hallway without  knee immobilizer. No buckling noted. Good effort     Time Calculation:    PT Charges     Row Name 12/07/22 1125             Time Calculation    Start Time 1125  -SC      PT Received On 12/07/22  -SC      PT Goal Re-Cert Due Date 12/14/22  -SC         Time Calculation- PT    Total Timed Code Minutes- PT 20 minute(s)  -SC         Timed Charges    50421 - PT Therapeutic Exercise Minutes 10  -SC      02031 - Gait Training Minutes  10  -SC         Total Minutes    Timed Charges Total Minutes 20  -SC       Total Minutes 20  -SC            User Key  (r) = Recorded By, (t) = Taken By, (c) = Cosigned By    Initials Name Provider Type    SC Orion Reyes PT Physical Therapist              Therapy Charges for Today     Code Description Service Date Service Provider Modifiers Qty    29960742960 HC PT THER PROC EA 15 MIN 12/7/2022 Orion Reyes PT GP 1          PT G-Codes  Outcome Measure Options: AM-PAC 6 Clicks Basic Mobility (PT)  AM-PAC 6 Clicks Score (PT): 19  AM-PAC 6 Clicks Score (OT): 16  PT Discharge Summary  Anticipated Discharge Disposition (PT): inpatient rehabilitation facility    Orion Reyes PT  12/7/2022

## 2022-12-07 NOTE — THERAPY TREATMENT NOTE
Patient Name: Melchor Hardwick III  : 1965    MRN: [id number]                              Today's Date: 2022       Admit Date: 2022    Visit Dx:     ICD-10-CM ICD-9-CM   1. Infection of right knee (HCC)  M00.9 686.9     Patient Active Problem List   Diagnosis   • Hyperlipidemia   • Hypertensive disorder   • Obesity   • Type 2 diabetes mellitus with hyperglycemia, with long-term current use of insulin (HCC)   • Gas gangrene of foot (HCC)   • Cellulitis in diabetic foot (HCC)   • Other acute osteomyelitis of left foot (HCC)   • Osteomyelitis (HCC)   • Critical illness myopathy   • Staphylococcal arthritis of right knee (HCC)   • Other cirrhosis of liver (HCC)   • MRSA bacteremia   • Endocarditis of tricuspid valve   • Wound of right buttock   • History of osteomyelitis   • Debility   • Infection of right knee (HCC)   • S/P right knee implant removal with insertion of antibiotic spacer   • Hypothyroid     Past Medical History:   Diagnosis Date   • Diabetes mellitus (HCC)     4 times per day gets checked for sugar at rehab   • Dizzy    • Hyperlipidemia    • Hypertension    • Hypothyroid 2022   • MRSA infection     blood -      • Orthostatic hypotension    • Pressure sore on buttocks     top crack -  sees wound - but now rn take care of it at rehab - minor opening - dime size - pat nurse didnt assess-  pt states getting better   • Pyogenic arthritis of right knee joint, due to unspecified organism (HCC) 2022   • Sepsis (HCC)    • Wears glasses     readers     Past Surgical History:   Procedure Laterality Date   • ABSCESS DRAINAGE  2004    PERINEUM   • AMPUTATION FOOT Left 2022    Procedure: AMPUTATION FOOT;  Surgeon: Addison Colby MD;  Location: [facility];  Service: Podiatry;  Laterality: Left;   • INCISION AND DRAINAGE LEG Left 05/10/2022    Procedure: INCISION AND DRAINAGE LOWER EXTREMITY;  Surgeon: Addison Colby MD;  Location: [facility] OR;  Service: Podiatry;  Laterality:  Left;   • KNEE INCISION AND DRAINAGE Right 09/21/2022    Procedure: KNEE INCISION AND DRAINAGE RIGHT;  Surgeon: Brain Bentley MD;  Location:  [facility] OR;  Service: Orthopedics;  Laterality: Right;   • PERIPHERALLY INSERTED CENTRAL CATHETER INSERTION Left    • TOTAL KNEE  PROSTHESIS REMOVAL W/ SPACER INSERTION Right 11/30/2022    Procedure: ANTIBIOTIC SPACER PLACEMENT KNEE - RIGHT;  Surgeon: Brain Bentley MD;  Location:  [facility] OR;  Service: Orthopedics;  Laterality: Right;   • WOUND DEBRIDEMENT Left 05/13/2022    Procedure: DEBRIDEMENT FOOT;  Surgeon: Addison Colyb MD;  Location:  [facility] OR;  Service: Podiatry;  Laterality: Left;      General Information     Row Name 12/07/22 1034          OT Time and Intention    Document Type therapy note (daily note)  -     Mode of Treatment occupational therapy  -     Row Name 12/07/22 1034          General Information    Patient Profile Reviewed yes  -     Existing Precautions/Restrictions fall;brace on at all times;other (see comments);orthostatic hypotension  AUGUSTO d/c this date per Dr. Bentley ROM and WBAT; abdominal binder on when up; walking boot to L LE when up  -     Barriers to Rehab medically complex;previous functional deficit  -     Row Name 12/07/22 1034          Cognition    Orientation Status (Cognition) oriented x 4  -     Row Name 12/07/22 1034          Safety Issues, Functional Mobility    Safety Issues Affecting Function (Mobility) safety precautions follow-through/compliance;safety precaution awareness;insight into deficits/self-awareness;awareness of need for assistance  -     Impairments Affecting Function (Mobility) endurance/activity tolerance;range of motion (ROM);postural/trunk control;strength  -     Comment, Safety Issues/Impairments (Mobility) Per Dr. Bentley KI d/c and pt can complete ROM and WBAT without.  -           User Key  (r) = Recorded By, (t) = Taken By, (c) = Cosigned By    Initials Name Provider Type     Lm  Tonja COOPER OT Occupational Therapist                 Mobility/ADL's     Row Name 12/07/22 1036          Bed Mobility    Bed Mobility scooting/bridging;supine-sit  -     Scooting/Bridging [place] (Bed Mobility) modified independence  -     Supine-Sit [place] (Bed Mobility) modified independence  -     Assistive Device (Bed Mobility) head of bed elevated;bed rails  -     Comment, (Bed Mobility) extra time/effort for completion  -     Row Name 12/07/22 1036          Transfers    Transfers sit-stand transfer;bed-chair transfer  -     Row Name 12/07/22 1036          Bed-Chair Transfer    Bed-Chair [place] (Transfers) verbal cues;contact guard  -     Assistive Device (Bed-Chair Transfers) walker, front-wheeled  -     Row Name 12/07/22 1036          Sit-Stand Transfer    Sit-Stand [place] (Transfers) minimum assist (75% patient effort);1 person assist;verbal cues  -     Assistive Device (Sit-Stand Transfers) walker, front-wheeled  -     Row Name 12/07/22 1036          Functional Mobility    Functional Mobility- Ind. Level not tested  -     Functional Mobility- Comment deferred to PT  -     Row Name 12/07/22 1036          Activities of Daily Living    BADL Assessment/Intervention lower body dressing  -     Row Name 12/07/22 1036          Mobility    Extremity Weight-bearing Status left lower extremity;right lower extremity  -     Left Lower Extremity (Weight-bearing Status) weight-bearing as tolerated (WBAT);other (see comments)  with walking boot  -     Right Lower Extremity (Weight-bearing Status) weight-bearing as tolerated (WBAT)  -     Row Name 12/07/22 1036          Upper Body Dressing Assessment/Training    [place] Level (Upper Body Dressing) don;other (see comments);minimum assist (75% patient effort)  abdominal binder  -     Position (Upper Body Dressing) unsupported sitting  -     Row Name 12/07/22 1036          Lower Body Dressing Assessment/Training     [place] Level (Lower Body Dressing) don;shoes/slippers;other (see comments);maximum assist (25% patient effort)  walking boot  -     Position (Lower Body Dressing) sitting up in bed  -     Row Name 12/07/22 1036          Grooming Assessment/Training    [place] Level (Grooming) hair care, combing/brushing;oral care regimen;wash face, hands;set up  -     Position (Grooming) supported sitting  -           User Key  (r) = Recorded By, (t) = Taken By, (c) = Cosigned By    Initials Name Provider Type     Tonja Amato OT Occupational Therapist               Obj/Interventions     Row Name 12/07/22 1039          Sensory Assessment (Somatosensory)    Sensory Assessment (Somatosensory) sensation intact  -     Row Name 12/07/22 1039          Balance    Balance Assessment sitting static balance;sitting dynamic balance;standing static balance;standing dynamic balance  -     Static Sitting Balance supervision  -     Dynamic Sitting Balance supervision  -     Position, Sitting Balance unsupported;sitting edge of bed  -     Dynamic Standing Balance minimal assist  -     Position/Device Used, Standing Balance supported;walker, rolling  -     Balance Interventions sitting;occupation based/functional task  -           User Key  (r) = Recorded By, (t) = Taken By, (c) = Cosigned By    Initials Name Provider Type     Tonja Amato, PAYAM Occupational Therapist               Goals/Plan    No documentation.                Clinical Impression     Row Name 12/07/22 1040          Pain Assessment    Pretreatment Pain Rating 3/10  -HM     Posttreatment Pain Rating 3/10  -HM     Pain Location - Side/Orientation Right  -     Pain Location generalized  -     Pain Location - knee  -HM     Pain Intervention(s) Ambulation/increased activity;Repositioned  -     Row Name 12/07/22 1040          Plan of Care Review    Plan of Care Reviewed With patient  -     Progress improving  -     Outcome  Evaluation Pt is pleasent and cooperative. Completes bed mobility with CI and extra time/effort. Pt maxA for to don shoe and walking book. Donned abdominal binder with Duane from OT. Per MD CASAS d/c this date. Pt transferred with minAx1 and required CGA to take steps from bed to chair. Pt sat supported in chair to complete all oral care, wash face, and comb hair. Continue IP OT per POC.  -     Row Name 12/07/22 1040          Therapy Assessment/Plan (OT)    Rehab Potential (OT) good, to achieve stated therapy goals  -     Criteria for Skilled Therapeutic Interventions Met (OT) yes;skilled treatment is necessary  -     Therapy Frequency (OT) daily  -     Row Name 12/07/22 1040          Therapy Plan Review/Discharge Plan (OT)    Anticipated Discharge Disposition (OT) LT ([facility])  -     Row Name 12/07/22 1040          Vital Signs    Pre Systolic BP Rehab 125  -HM     Pre Treatment Diastolic BP 75  -HM     Post Systolic BP Rehab 115  -HM     Post Treatment Diastolic BP 69  -HM     O2 Delivery Pre Treatment room air  -HM     O2 Delivery Intra Treatment room air  -HM     O2 Delivery Post Treatment room air  -HM     Pre Patient Position Supine  -     Intra Patient Position Standing  -     Post Patient Position Sitting  -HM     Row Name 12/07/22 1040          Positioning and Restraints    Pre-Treatment Position in bed  -HM     Post Treatment Position chair  -HM     In Chair notified nsg;reclined;call light within reach;encouraged to call for assist;exit alarm on  -           User Key  (r) = Recorded By, (t) = Taken By, (c) = Cosigned By    Initials Name Provider Type     Tonja Amato, OT Occupational Therapist               Outcome Measures     Row Name 12/07/22 1045          How much help from another is currently needed...    Putting on and taking off regular lower body clothing? 2  -HM     Bathing (including washing, rinsing, and drying) 2  -HM     Toileting (which includes using  toilet bed pan or urinal) 2  -     Putting on and taking off regular upper body clothing 3  -     Taking care of personal grooming (such as brushing teeth) 3  -     Eating meals 4  -     AM-PAC 6 Clicks Score (OT) 16  -     Row Name 12/07/22 1045          Functional Assessment    Outcome Measure Options AM-PAC 6 Clicks Daily Activity (OT)  -           User Key  (r) = Recorded By, (t) = Taken By, (c) = Cosigned By    Initials Name Provider Type     Tonja Amato OT Occupational Therapist                Occupational Therapy Education     Title: PT OT SLP Therapies (In Progress)     Topic: Occupational Therapy (In Progress)     Point: ADL training (Done)     Description:   Instruct learner(s) on proper safety adaptation and remediation techniques during self care or transfers.   Instruct in proper use of assistive devices.              Learning Progress Summary           Patient Acceptance, TB,E,D, VU,NR by  at 12/7/2022 1045    Acceptance, E,D, NR by ALTHEA at 12/2/2022 0751                   Point: Home exercise program (Not Started)     Description:   Instruct learner(s) on appropriate technique for monitoring, assisting and/or progressing therapeutic exercises/activities.              Learner Progress:  Not documented in this visit.          Point: Precautions (Done)     Description:   Instruct learner(s) on prescribed precautions during self-care and functional transfers.              Learning Progress Summary           Patient Acceptance, TB,E,D, VU,NR by  at 12/7/2022 1045    Acceptance, E,D, NR by ALTHEA at 12/2/2022 0751                   Point: Body mechanics (Done)     Description:   Instruct learner(s) on proper positioning and spine alignment during self-care, functional mobility activities and/or exercises.              Learning Progress Summary           Patient Acceptance, TB,E,D, VU,NR by  at 12/7/2022 1045    Acceptance, E,D, NR by ALTHEA at 12/2/2022 0751                                User Key     Initials Effective Dates Name Provider Type Discipline     10/25/22 -  Tonja Amato OT Occupational Therapist OT    JSISSY 06/16/21 -  Jaja Suresh OT Occupational Therapist OT              OT Recommendation and Plan  Therapy Frequency (OT): daily  Plan of Care Review  Plan of Care Reviewed With: patient  Progress: improving  Outcome Evaluation: Pt is pleasent and cooperative. Completes bed mobility with CI and extra time/effort. Pt maxA for to don shoe and walking book. Donned abdominal binder with Duane from OT. Per MD CASAS d/c this date. Pt transferred with minAx1 and required CGA to take steps from bed to chair. Pt sat supported in chair to complete all oral care, wash face, and comb hair. Continue IP OT per POC.     Time Calculation:    Time Calculation- OT     Row Name 12/07/22 0954             Time Calculation- OT    OT Start Time 0954  -HM      OT Received On 12/07/22  -HM         Timed Charges    12770 - OT Self Care/Mgmt Minutes 25  -HM         Total Minutes    Timed Charges Total Minutes 25  -HM       Total Minutes 25  -HM            User Key  (r) = Recorded By, (t) = Taken By, (c) = Cosigned By    Initials Name Provider Type     Tonja Amato OT Occupational Therapist              Therapy Charges for Today     Code Description Service Date Service Provider Modifiers Qty    15019445776 HC OT SELF CARE/MGMT/TRAIN EA 15 MIN 12/7/2022 Tonja Amato, OT GO 2               Tonja Amato OT  12/7/2022

## 2022-12-07 NOTE — PLAN OF CARE
Goal Outcome Evaluation:  Plan of Care Reviewed With: patient        Progress: improving  Outcome Evaluation: Pt is pleasent and cooperative. Completes bed mobility with CI and extra time/effort. Pt maxA for to don shoe and walking book. Donned abdominal binder with Duane from OT. Per MD CASAS d/c this date. Pt transferred with minAx1 and required CGA to take steps from bed to chair. Pt sat supported in chair to complete all oral care, wash face, and comb hair. Continue IP OT per POC.

## 2022-12-07 NOTE — PROGRESS NOTES
[facility] Progress Note        Antibiotics:  Anti-Infectives (From admission, onward)    Ordered     Dose/Rate Route Frequency Start Stop    12/06/22 2158  vancomycin 1250 mg/250 mL 0.9% NS IVPB ([facility])        Ordering Provider: Bryce Euceda MD    1,250 mg  over 90 Minutes Intravenous Every 12 Hours 12/06/22 2300 12/09/22 1059    12/02/22 1159  ceftaroline (TEFLARO) 600 mg/100 mL 0.9% NS (mbp)        Ordering Provider: Kushal Balderrama RPH    600 mg Intravenous Every 12 Hours Scheduled 12/02/22 1500 12/09/22 2059    12/01/22 0933  vancomycin 1750 mg/500 mL 0.9% NS IVPB ([facility])        Ordering Provider: Bryce Euceda MD    20 mg/kg × 88.9 kg  over 120 Minutes Intravenous Once 12/01/22 1030 12/01/22 1310    12/01/22 0919  Pharmacy to dose vancomycin        Ordering Provider: Bryce Euceda MD     Does not apply Continuous PRN 12/01/22 0919 12/29/22 0918    11/30/22 1449  ceFAZolin in dextrose (ANCEF) IVPB solution 2 g        Ordering Provider: Brain Bentley MD    2 g  over 30 Minutes Intravenous Every 8 Hours 11/30/22 1800 12/01/22 0151    11/30/22 0815  ceFAZolin in dextrose (ANCEF) IVPB solution 2 g        Ordering Provider: Brain Bentley MD    2 g  over 30 Minutes Intravenous Once 11/30/22 0817 11/30/22 1045    11/30/22 0815  vancomycin 1250 mg/250 mL 0.9% NS IVPB ([facility])        Ordering Provider: Brain Bentley MD    15 mg/kg × 88.9 kg Intravenous Once 11/30/22 0817 11/30/22 0902          CC: fatigue    HPI:    Patient is a 57 y.o.  Yr old male with history of cirrhosis/diabetes and other comorbidity as outlined below.  History of left foot gas gangrene with osteomyelitis and MRSA bacteremia treated in [place] by Dr. Giordano in May/June 2022.  Family reports left transmetatarsal amputation in approximately 8 weeks IV vancomycin.  Notes from [place] indicate transthoracic echocardiogram no vegetation per Dr. Giordano; he thinks he might of had several weeks of oral antibiotic after that but not entirely  clear.  He is admitted to [facility] September 20 with progressive right knee pain occurring over the past week preceding admission.  He thinks he might of twisted it but ended up with progressive redness/swelling and pain.  No specific blunt force or penetrating trauma.  he was evaluated by orthopedics and taken to the operating room for right knee incision/drainage and concern for septic arthritis per Dr. Bentley; blood cultures had  MRSA.  Had dysuria but urinalysis not consistent with UTI.  No hematuria or pyuria     9/23 with TV echodensity; 9/26/22  TYRESE no vegetation per cardiology; daptomycin maintained, transferred to [place] with care taken over by Dr. Giordano; repeat blood cultures there on October 18 and October 20 and October 22 (dapto STU = 1)  with MRSA; blood cultures on October 24 and November 12 were negative per microbiology.  Repeat blood cultures November 20 with MRSA and daptomycin STU equal to 3, not susceptible per my discussion with microbiology; blood cultures positive again on November 22 but negative November 23. per Dr Giordano, teflaro had been added and concern regarding right knee with increased swelling/pain prompted transitioned back to [place] for further right knee surgery on November 30 by Dr Bentley     **I d/w Dr Giordano; he felt there was an obvious right arm PICC line infection in October with changes at the exit site and concern it was the reason for positive blood cultures at that time.  No catheter tip culture available per micro.  In November, recurrent bacteremia associated with right knee swelling/pain but no other focal symptoms per my discussion with Dr. Giordano; at risk for sequestered/metastatic focus    12/5/22 d/w Dr Giordano and plans to get back to [facility];  He will facilitate additional radiographic workup if needed depending on clinical course    12/7/22 doing okay overall. he denies any new focal pain or symptomatology.postop right knee with controlled  "pain,  dull at present,  Better controlled per nursing overall, nonradiating, worse with movement, better with pain meds and 2 out of 10 in severity at present; he denies any other focal musculoskeletal pain.  No new back pain but he does relate chronic lumbar pain, dull at present, worse with movement, not progressive and no new weakness or numbness. No myalgia     Left foot with no redness/swelling or pain.  Surgical site healed with no open wound or active drainage. Has a left arm midline     No headache photophobia or neck stiffness.  No nausea vomiting diarrhea or abdominal pain.  No shortness of breath cough or hemoptysis.  No other new skin rash.  No other recent procedures or interventions.    No indwelling pacemaker        ROS:      12/7/22 No f/c/s. No n/v/d. No rash. No new ADR to Abx.     Constitutional-- No Fever, chills or sweats.  Appetite diminished with fatigue.  Heent-- No new vision, hearing or throat complaints.  No epistaxis or oral sores.  Denies odynophagia or dysphagia.  No flashers, floaters or eye pain.   No headache, photophobia or neck stiffness.  CV-- No chest pain, palpitation or syncope  Resp-- No SOB/cough/Hemoptysis  GI- No nausea, vomiting, or diarrhea.  No hematochezia, melena, or hematemesis. Denies jaundice  -- No dysuria, hematuria, or flank pain.  Denies hesitancy, urgency or flank pain.  Lymph- no swollen lymph nodes in neck/axilla or groin.   Heme- No active bruising or bleeding; no Hx of DVT or PE.  MS-- no swelling or pain in the bones or joints of arms/legs.  No new back pain.  Neuro-- No acute focal weakness or numbness in the arms or legs.  No seizures.     Full 12 point review of systems reviewed and negative otherwise for acute complaints, except for above      PE: nursing/out from present  /75 (BP Location: Left arm, Patient Position: Lying)   Pulse 73   Temp 98.1 °F (36.7 °C) (Oral)   Resp 18   Ht 177.8 cm (70\")   Wt 88.9 kg (196 lb)   SpO2 96%   BMI " 28.12 kg/m²     GENERAL: Awake and alert, in no acute distress.  Chronically ill-appearing  HEENT: Normocephalic, atraumatic.    No conjunctival injection. No icterus. Oropharynx clear without evidence of thrush or exudate. No evidence of peridontal disease.    NECK: Supple without nuchal rigidity. No mass.   HEART: RRR; soft murmur LSB, rubs, gallops.   LUNGS: Diminished at bases but otherwise clear to auscultation bilaterally without wheezing, rales, rhonchi. Normal respiratory effort. Nonlabored. No dullness.  ABDOMEN: Soft, nontender, nondistended. Positive bowel sounds. No rebound or guarding. NO mass or HSM.  EXT:  No cyanosis, clubbing or edema. No cord.   MSK: FROM without joint effusions noted arms/legs.    SKIN: Warm and dry without cutaneous eruptions on Inspection/palpation.    NEURO: Oriented to PPT. No focal deficits on motor/sensory exam at arms/legs.     Left foot with no redness induration or warmth.  No discrete mass bulge or fluctuance.  No crepitus or bulla.  Prior surgical site healed at [facility].  No open wound or active drainage     Right knee postop surgical site no new redness or purulence.    No discrete mass bulge or fluctuance.  No crepitus or bulla.     No peripheral stigmata/phenomena of endocarditis     IV without obvious redness or drainage    Laboratory Data    Results from last 7 days   Lab Units 12/06/22  0534 12/02/22  0431 12/01/22  0824   WBC 10*3/mm3  --   --  9.88   HEMOGLOBIN g/dL 7.3* 7.9* 10.2*   HEMATOCRIT % 23.3* 25.0* 31.6*   PLATELETS 10*3/mm3  --   --  139*     Results from last 7 days   Lab Units 12/07/22  0432   SODIUM mmol/L 139   POTASSIUM mmol/L 4.3   CHLORIDE mmol/L 105   CO2 mmol/L 27.0   BUN mg/dL 13   CREATININE mg/dL 0.70*   GLUCOSE mg/dL 151*   CALCIUM mg/dL 7.7*                   Estimated Creatinine Clearance: 130.8 mL/min (A) (by C-G formula based on SCr of 0.7 mg/dL (L)).      Microbiology:      Radiology:  Imaging Results (Last 72 Hours)     ** No results  "found for the last 72 hours. **            Impression:       --Acute/recurrent/persistent MRSA bacteremia/septicemia; prior history positive cultures May 2022 and recurrent despite prior  IV vancomycin and left foot surgery in addition to other supportive measures.   TTE with ? TV echodensity prior admit in September and TYRESE no vegetation at that time at [facility];   blood culture positivity again at [facility] in October/November as above.  Daptomycin STU increased to 3 on 11/20 isolate and not sensitive to daptomycin per microbiology.  Discussed on several occasions with Dr. Giordano; had a repeat TYRESE on October 31 with mention of \" moderate size echodense structure above the posterior tricuspid leaflet but not attached to the leaflet\" per cardiology there, described potentially as a \"normal variant\" per their note.  High risk for further serious morbidity and other serious sequela including persistent/progressive or recurrent infection, metastatic foci of involvement and other dire consequences;  vancomycin sensitivity maintained albeit with STU =2; adjusted to vancomycin/ceftaroline in light of daptomycin resistance and I asked microbiology     **Blood cultures November 23 negative so far  **repeat TYRESE 12/1 ; no definitive vegetation per cardiology and only mild tricuspid valve regurgitation stable from prior exam per them.  Ill-defined area in the right atrium appeared stable to them. D/w Dr Trent; you still may need to consider PET scan and likely to require repeat echocardiography during his course.  I have discussed with Dr. Giordano and he will arrange     --Acute right knee pain/septic arthritis with surgical intervention, incision/debridement by Dr. Bentley on September 21.   MRSA+ in the setting of MRSA bacteremia at that time;  Repeat surgery 11/30, d/w Dr Bentley. Cultures November 30 negative     --History of cirrhosis by records.  Unclear etiology.   further GI referral/eval per  medicine " team     --Diabetes.  You need to tightly control blood sugar to give best chance for healing.;  Described as uncontrolled by medicine team at admission     -- Chronic lumbar back pain for decades.  This is not new, not worse and no new location.  No new exacerbation or new neurologic symptomatology in his extremities.  MRI 12/3 suboptimal per radiology;  may require further imaging depending on his clinical course to help exclude any other sequestered/spinal-paraspinal focus depending on clinical course     --TCP ; monitor     PLAN:       -- IV vancomycin/ceftaroline potentially 6 weeks but to depend on clinical course/study results and response to therapy.  High risk for occult endovascular focus with multiple past positive blood cultures, most recently November 20 and 22; after IV antibiotics, depending on clinical course, may require chronic oral suppression step down (likely to be decided by Dr. Giordano )     -- Check/review labs cultures and scans     -- Partial history per nursing staff     --d/w pharmacy       -- Highly complex set of issues with high risk for further serious morbidity and other serious sequela     -- Discussed with microbiology; they are doing further assessment to blood cultures drawn November 22     --d/w Dr Giordano       --  new PICC line,   No specific new pain or other suppurative changes at the surface at either site.  I discussed with Dr. Trent regarding additional diagnostics for endocarditis such as PET scan; this still may need to be considered as outpatient depending on clinical course.  see above.     **MRI's noted without specific focus at Lumbar spine or foot although spinal imaging suboptimal per radiology; I d/w Dr Giordano with plans for transition back to LTACH there; depending on clinical course/symptomatology, Dr. Giordano will consider white blood cell scan versus repeat imaging to evaluate for potential occult focus.    **Copied text in this note has been reviewed and is  accurate as of 12/07/22.        Bryce Euceda MD  12/7/2022

## 2022-12-07 NOTE — CASE MANAGEMENT/SOCIAL WORK
Continued Stay Note  [facility]     Patient Name: Melchor Hardwick III  MRN: [id number]  Today's Date: 12/7/2022    Admit Date: 11/30/2022    [facility]   Discharge Plan     Row Name 12/07/22 1424       Plan    Plan [facility]    Patient/Family in Agreement with Plan yes    Plan Comments [facility] has submitted Mr. Hardwick to their administrators for review to get approval for his Teflaro antibiotic.Case management will continue to follow.    Final Discharge Disposition Code 63 - LT               Discharge Codes    No documentation.               Expected Discharge Date and Time     Expected Discharge Date Expected Discharge Time    Dec 7, 2022             Gavin Collazo RN

## 2022-12-07 NOTE — PROGRESS NOTES
"IM progress note      Melchor Hardwick III  [id number]  1965     LOS: 7 days     Attending: Brain Bentley MD    Primary Care Provider: Missy Up APRN      Chief Complaint/Reason for visit:  No chief complaint on file.      Subjective   Doing well.  Good pain control.  No new complaints. Denies f/c/n/v/sob/cp.    Objective        Visit Vitals  /58 (BP Location: Left arm, Patient Position: Lying)   Pulse 78   Temp 98.1 °F (36.7 °C) (Oral)   Resp 18   Ht 177.8 cm (70\")   Wt 88.9 kg (196 lb)   SpO2 96%   BMI 28.12 kg/m²     Temp (24hrs), Av °F (36.7 °C), Min:97.4 °F (36.3 °C), Max:98.3 °F (36.8 °C)      Intake/Output:    Intake/Output Summary (Last 24 hours) at 2022 1617  Last data filed at 2022 1300  Gross per 24 hour   Intake 970 ml   Output 1450 ml   Net -480 ml         Physical Exam:     General Appearance:    Alert, cooperative, in no acute distress   Head:    Normocephalic, without obvious abnormality, atraumatic    Lungs:     Normal effort, symmetric chest rise,  clear to      auscultation bilaterally              Heart:    Regular rhythm and normal rate, normal S1 and S2    Abdomen:     Normal bowel sounds, no masses, no organomegaly, soft        non-tender, non-distended, no guarding, no rebound                tenderness.  Abdominal binder in place   Extremities:  Ace wrapped, knee immobilizer on right knee.    Prevena suction dressing.     Pulses:   Pulses palpable and equal bilaterally   Skin:   No bleeding, bruising or rash          Results Review:     I reviewed the patient's new clinical results.   Results from last 7 days   Lab Units 22  0534 22  0431 22  0824   WBC 10*3/mm3  --   --  9.88   HEMOGLOBIN g/dL 7.3* 7.9* 10.2*   HEMATOCRIT % 23.3* 25.0* 31.6*   PLATELETS 10*3/mm3  --   --  139*     Results from last 7 days   Lab Units 22  0432 22  0534 22  0441   SODIUM mmol/L 139 136 137   POTASSIUM mmol/L 4.3 4.1 4.2   CHLORIDE mmol/L 105 " 103 104   CO2 mmol/L 27.0 28.0 28.0   BUN mg/dL 13 13 11   CREATININE mg/dL 0.70* 0.72* 0.56*   CALCIUM mg/dL 7.7* 8.0* 8.0*   GLUCOSE mg/dL 151* 132* 198*   TYRESE   •  Indication for TYRESE -to rule out infective endocarditis in a patient with MRSA bacteremia.  •  Findings-  •  Left ventricular systolic function is normal. Estimated left ventricular EF = 60%  •  Left atrial appendage is well visualized and is free of thrombus.  •  Saline test was negative for the evidence of shunt in interatrial septum.  •  The tricuspid valve appeared normal in structure. There was a moderate sized echodense structure seen above  the posterior tricuspid leaflet but not attached to the leaflet. The appearance and location are not typical for regurgitation. This structure could be a normal variant. No evidence of tricuspid valve stenosis or significant regurgitations.  •  Other valves also appear normal in structure with no evidence of stenosis or significant regurgitations.  No vegetations seen on these valves.  •  There is no evidence of pericardial effusion.  •  Comment-  •  In view of presence of MRSA bacteremia, recommend to extend antibiotic therapy for possible infective endocarditis and repeat TYRESE in 3 months.         Latest Reference Range & Units 12/07/22 04:32 12/07/22 07:11 12/07/22 11:15   Glucose 70 - 130 mg/dL 151 (H) 124 123   (H): Data is abnormally high    All medications reviewed.   acetaminophen, 1,000 mg, Oral, Q8H  aspirin, 81 mg, Oral, Q12H  ceftaroline, 600 mg, Intravenous, Q12H  insulin detemir, 20 Units, Subcutaneous, Q12H  insulin lispro, 0-7 Units, Subcutaneous, TID AC  Insulin Lispro, 7 Units, Subcutaneous, TID With Meals  levothyroxine, 25 mcg, Oral, Q AM  meloxicam, 15 mg, Oral, Daily  midodrine, 2.5 mg, Oral, TID AC  pantoprazole, 40 mg, Oral, Q AM  polyethylene glycol, 17 g, Oral, Daily  pregabalin, 150 mg, Oral, Q12H  sodium chloride, 10 mL, Intravenous, Q12H  vancomycin, 1,250 mg, Intravenous,  Q12H      cyclobenzaprine, 5 mg, TID PRN  dextrose, 25 g, Q15 Min PRN  dextrose, 15 g, Q15 Min PRN  diphenhydrAMINE, 25 mg, Q6H PRN   Or  diphenhydrAMINE, 25 mg, Q6H PRN  glucagon (human recombinant), 1 mg, Q15 Min PRN  HYDROmorphone, 0.5 mg, Q2H PRN   And  naloxone, 0.1 mg, Q5 Min PRN  labetalol, 10 mg, Q4H PRN  ondansetron, 4 mg, Q6H PRN   Or  ondansetron, 4 mg, Q6H PRN  oxyCODONE, 10 mg, Q4H PRN  oxyCODONE, 5 mg, Q4H PRN  Pharmacy to dose vancomycin, , Continuous PRN  sodium chloride, 500 mL, TID PRN  sodium chloride, 10 mL, PRN        Assessment & Plan       S/P right knee implant removal with insertion of antibiotic spacer    Infection of right knee (HCC)    Type 2 diabetes mellitus with hyperglycemia, with long-term current use of insulin ([facility])    Hypothyroid  MRSA sepsis  Chronic low back pain  S/p PICC placement.      Plan  1. PT/OT, weightbearing per protocol.  2. Pain control-prns   3. IS-encouraged  4. DVT proph-mechanicals, aspirin.  5. Bowel regimen  6. Monitor post-op labs  7. DC planning, to LTAC at [facility],  is following, referral is sent.     Hypothyroid  -Continue home Synthroid     DM  - hgb A1c on 11/28/2022 7.3  -Continue home bolus insulin with meals and long-acting insulin twice daily/ doses increased 12/1.   - Accu-Chek AC and HS with low dose SSI    TYRESE, done, noted, no obvious vegetation.    No infectious focus evident on MRIs.    Antibiotics per ID, Dr. Euceda is following   Plan on tagged WBC scan here or at [facility].     Watch for adverse drug reactions.  Noted antibiotic regimen changed from daptomycin  No adverse drug reactions observed.      Tiffanie Cornell, APRN  12/07/22  16:17 EST

## 2022-12-07 NOTE — PROGRESS NOTES
Orthopedic Progress Note      Patient: Melchor Hardwick III  YOB: 1965     Date of Admission: 11/30/2022  7:25 AM Medical Record Number: [id number]     Attending Physician: Brain Bentley MD    Status Post:  Procedure(s):  ANTIBIOTIC SPACER PLACEMENT KNEE - RIGHT Post Operative Day Number: 7    Subjective : No new orthopaedic complaints     Pain Relief: some relief with present medication.     Systemic Complaints: No Complaints  Vitals:    12/06/22 1947 12/07/22 0357 12/07/22 0618 12/07/22 0710   BP: 123/59 116/70 117/61 125/75   BP Location: Left arm Left arm  Left arm   Patient Position: Lying Lying  Lying   Pulse: 80 74 71 73   Resp: 18 18  18   Temp: 98.3 °F (36.8 °C) 98.3 °F (36.8 °C)  98.1 °F (36.7 °C)   TempSrc: Oral Oral  Oral   SpO2: 96% 95%  96%   Weight:       Height:           Physical Exam: 57 y.o. male    General Appearance:       Alert, cooperative, in no acute distress                  Extremities:    Dressing Clean, Dry and Intact             No clinical sign of DVT        Able to do good movements of digits    Pulses:   Pulses palpable and equal bilaterally           Diagnostic Tests:     Results from last 7 days   Lab Units 12/06/22  0534 12/02/22  0431 12/01/22  0824   WBC 10*3/mm3  --   --  9.88   HEMOGLOBIN g/dL 7.3* 7.9* 10.2*   HEMATOCRIT % 23.3* 25.0* 31.6*   PLATELETS 10*3/mm3  --   --  139*     Results from last 7 days   Lab Units 12/07/22  0432 12/06/22  0534 12/05/22  0441   SODIUM mmol/L 139 136 137   POTASSIUM mmol/L 4.3 4.1 4.2   CHLORIDE mmol/L 105 103 104   CO2 mmol/L 27.0 28.0 28.0   BUN mg/dL 13 13 11   CREATININE mg/dL 0.70* 0.72* 0.56*   GLUCOSE mg/dL 151* 132* 198*   CALCIUM mg/dL 7.7* 8.0* 8.0*         No results found for: URICACID  Lab Results   Component Value Date    CRYSTAL No crystals seen 09/21/2022     Microbiology Results (last 10 days)     Procedure Component Value - Date/Time    Fungus Culture - Tissue, Knee, Right [[id number]] Collected: 11/30/22  1151    Lab Status: Preliminary result Specimen: Tissue from Knee, Right Updated: 12/05/22 1330     Fungus Culture No fungus isolated at less than 1 week    Tissue / Bone Culture - Tissue, Knee, Right [[id number]] Collected: 11/30/22 1151    Lab Status: Final result Specimen: Tissue from Knee, Right Updated: 12/03/22 1213     Tissue Culture No growth at 3 days     Gram Stain Few (2+) WBCs seen      No organisms seen    Anaerobic Culture 10 Day Incubation - Tissue, Knee, Right [[id number]]  (Normal) Collected: 11/30/22 1151    Lab Status: Preliminary result Specimen: Tissue from Knee, Right Updated: 12/05/22 0757     Anaerobic Culture No anaerobes isolated at 5 days    Fungus Culture - Tissue, Knee, Right [[id number]] Collected: 11/30/22 1149    Lab Status: Preliminary result Specimen: Tissue from Knee, Right Updated: 12/05/22 1330     Fungus Culture No fungus isolated at less than 1 week    Tissue / Bone Culture - Tissue, Knee, Right [[id number]] Collected: 11/30/22 1149    Lab Status: Final result Specimen: Tissue from Knee, Right Updated: 12/03/22 1213     Tissue Culture No growth at 3 days     Gram Stain Few (2+) WBCs seen      No organisms seen    AFB Culture - Tissue, Knee, Right [[id number]] Collected: 11/30/22 1149    Lab Status: Preliminary result Specimen: Tissue from Knee, Right Updated: 12/05/22 1330     AFB Culture No AFB isolated at less than 1 week     AFB Stain No acid fast bacilli seen on concentrated smear    Anaerobic Culture 10 Day Incubation - Tissue, Knee, Right [[id number]]  (Normal) Collected: 11/30/22 1149    Lab Status: Preliminary result Specimen: Tissue from Knee, Right Updated: 12/05/22 0757     Anaerobic Culture No anaerobes isolated at 5 days    Fungus Culture - Synovium, Knee, Right [[id number]] Collected: 11/30/22 1140    Lab Status: Preliminary result Specimen: Synovium from Knee, Right Updated: 12/05/22 1330     Fungus Culture No fungus isolated at less than 1 week    Tissue / Bone Culture  - Synovium, Knee, Right [[id number]] Collected: 11/30/22 1140    Lab Status: Final result Specimen: Synovium from Knee, Right Updated: 12/03/22 1212     Tissue Culture No growth at 3 days     Gram Stain Few (2+) WBCs seen      No organisms seen    AFB Culture - Synovium, Knee, Right [[id number]] Collected: 11/30/22 1140    Lab Status: Preliminary result Specimen: Synovium from Knee, Right Updated: 12/05/22 1330     AFB Culture No AFB isolated at less than 1 week     AFB Stain No acid fast bacilli seen on concentrated smear    Anaerobic Culture 10 Day Incubation - Synovium, Knee, Right [[id number]]  (Normal) Collected: 11/30/22 1140    Lab Status: Preliminary result Specimen: Synovium from Knee, Right Updated: 12/05/22 0757     Anaerobic Culture No anaerobes isolated at 5 days    MRSA Screen, PCR (Inpatient) - Swab, Nares [[id number]]  (Normal) Collected: 11/28/22 1256    Lab Status: Final result Specimen: Swab from Nares Updated: 11/28/22 1516     MRSA PCR Negative    Narrative:      The negative predictive value of this diagnostic test is high and should only be used to consider de-escalating anti-MRSA therapy. A positive result may indicate colonization with MRSA and must be correlated clinically.  MRSA Negative        XR Knee 1 or 2 View Right    Result Date: 11/30/2022  Postsurgical findings of interval right total knee arthroplasty without evidence of immediate hardware complication. Expected soft tissue edema and subcutaneous emphysema.  This report was finalized on 11/30/2022 3:42 PM by Garry Gonzalez.      MRI Foot Left With & Without Contrast    Result Date: 12/3/2022  Impression: There has been amputation of the foot involving the metatarsals. There is heterogeneous low T1 high T2 signal intensity of the cuboid, cuneiforms and navicular bones. However, no definite rim-enhancing or drainable fluid collection or significant ulceration is seen. Other etiologies such as osteonecrosis are favored over  osteomyelitis. Clinical correlation is recommended. Electronically Signed: Liyah Howard MD  12/3/2022 5:42 PM EST  Workstation ID: [id number]    MRI Lumbar Spine With & Without Contrast    Result Date: 12/3/2022  Borderline nondiagnostic exam. This is not felt to be diagnostic for exclusion of osteomyelitis or discitis. There is no gross abscess.  This report was finalized on 12/3/2022 5:48 PM by Brain Rosado MD.          Current Medications:  Scheduled Meds:acetaminophen, 1,000 mg, Oral, Q8H  aspirin, 81 mg, Oral, Q12H  ceftaroline, 600 mg, Intravenous, Q12H  insulin detemir, 20 Units, Subcutaneous, Q12H  insulin lispro, 0-7 Units, Subcutaneous, TID AC  Insulin Lispro, 7 Units, Subcutaneous, TID With Meals  levothyroxine, 25 mcg, Oral, Q AM  meloxicam, 15 mg, Oral, Daily  midodrine, 2.5 mg, Oral, TID AC  pantoprazole, 40 mg, Oral, Q AM  polyethylene glycol, 17 g, Oral, Daily  pregabalin, 150 mg, Oral, Q12H  sodium chloride, 10 mL, Intravenous, Q12H  vancomycin, 1,250 mg, Intravenous, Q12H      Continuous Infusions:lactated ringers, 9 mL/hr, Last Rate: Stopped (11/30/22 1306)  lactated ringers, 100 mL/hr, Last Rate: 100 mL/hr (12/01/22 0540)  Pharmacy to dose vancomycin,   ropivacaine,       PRN Meds:.•  cyclobenzaprine  •  dextrose  •  dextrose  •  diphenhydrAMINE **OR** diphenhydrAMINE  •  glucagon (human recombinant)  •  HYDROmorphone **AND** naloxone  •  labetalol  •  ondansetron **OR** ondansetron  •  oxyCODONE  •  oxyCODONE  •  Pharmacy to dose vancomycin  •  sodium chloride  •  sodium chloride  •  traMADol    Assessment: Status post  ANTIBIOTIC SPACER PLACEMENT KNEE - RIGHT    Patient Active Problem List   Diagnosis   • Hyperlipidemia   • Hypertensive disorder   • Obesity   • Type 2 diabetes mellitus with hyperglycemia, with long-term current use of insulin ([facility])   • Gas gangrene of foot ([facility])   • Cellulitis in diabetic foot ([facility])   • Other acute osteomyelitis of left foot ([facility])   • Osteomyelitis ([facility])   •  Critical illness myopathy   • Staphylococcal arthritis of right knee ([facility])   • Other cirrhosis of liver ([facility])   • MRSA bacteremia   • Endocarditis of tricuspid valve   • Wound of right buttock   • History of osteomyelitis   • Debility   • Infection of right knee ([facility])   • S/P right knee implant removal with insertion of antibiotic spacer   • Hypothyroid       PLAN:   Continues current post-op course  Anticoagulation: Aspirin started  Mobilize with PT as tolerated per protocol  abx per ID  Incisional wound vac removed  Ok to come out of knee immobilizer ROM as tolerated     Weight Bearing: WBAT  Discharge Plan: OK to plan for discharge to rehab      Brain Bentley MD    Date: 12/7/2022    Time: 09:48 EST

## 2022-12-07 NOTE — PLAN OF CARE
Problem: Adult Inpatient Plan of Care  Goal: Plan of Care Review  Recent Flowsheet Documentation  Taken 12/7/2022 1154 by Orion Reyes, PT  Progress: improving  Plan of Care Reviewed With: patient  Outcome Evaluation: Patient able to ambulate in hallway without  knee immobilizer. No buckling noted. Good effort   Goal Outcome Evaluation:  Plan of Care Reviewed With: patient        Progress: improving  Outcome Evaluation: Patient able to ambulate in hallway without  knee immobilizer. No buckling noted. Good effort

## 2022-12-08 LAB
GLUCOSE BLDC GLUCOMTR-MCNC: 117 MG/DL (ref 70–130)
GLUCOSE BLDC GLUCOMTR-MCNC: 145 MG/DL (ref 70–130)
GLUCOSE BLDC GLUCOMTR-MCNC: 78 MG/DL (ref 70–130)

## 2022-12-08 PROCEDURE — 25010000002 VANCOMYCIN 10 G RECONSTITUTED SOLUTION: Performed by: INTERNAL MEDICINE

## 2022-12-08 PROCEDURE — 63710000001 INSULIN DETEMIR PER 5 UNITS: Performed by: INTERNAL MEDICINE

## 2022-12-08 PROCEDURE — 82962 GLUCOSE BLOOD TEST: CPT

## 2022-12-08 PROCEDURE — 97116 GAIT TRAINING THERAPY: CPT

## 2022-12-08 PROCEDURE — 63710000001 INSULIN LISPRO (HUMAN) PER 5 UNITS: Performed by: INTERNAL MEDICINE

## 2022-12-08 PROCEDURE — 25010000002 CEFTAROLINE FOSAMIL PER 10 MG: Performed by: INTERNAL MEDICINE

## 2022-12-08 PROCEDURE — 97110 THERAPEUTIC EXERCISES: CPT

## 2022-12-08 RX ADMIN — PREGABALIN 150 MG: 150 CAPSULE ORAL at 20:53

## 2022-12-08 RX ADMIN — CEFTAROLINE FOSAMIL 600 MG: 600 POWDER, FOR SOLUTION INTRAVENOUS at 20:53

## 2022-12-08 RX ADMIN — MIDODRINE HYDROCHLORIDE 2.5 MG: 5 TABLET ORAL at 08:56

## 2022-12-08 RX ADMIN — MIDODRINE HYDROCHLORIDE 2.5 MG: 5 TABLET ORAL at 17:11

## 2022-12-08 RX ADMIN — ASPIRIN 81 MG: 81 TABLET, COATED ORAL at 08:56

## 2022-12-08 RX ADMIN — INSULIN LISPRO 7 UNITS: 100 INJECTION, SOLUTION INTRAVENOUS; SUBCUTANEOUS at 08:56

## 2022-12-08 RX ADMIN — INSULIN DETEMIR 20 UNITS: 100 INJECTION, SOLUTION SUBCUTANEOUS at 08:57

## 2022-12-08 RX ADMIN — MELOXICAM 15 MG: 15 TABLET ORAL at 08:57

## 2022-12-08 RX ADMIN — INSULIN LISPRO 7 UNITS: 100 INJECTION, SOLUTION INTRAVENOUS; SUBCUTANEOUS at 17:11

## 2022-12-08 RX ADMIN — INSULIN LISPRO 7 UNITS: 100 INJECTION, SOLUTION INTRAVENOUS; SUBCUTANEOUS at 11:54

## 2022-12-08 RX ADMIN — LEVOTHYROXINE SODIUM 25 MCG: 25 TABLET ORAL at 05:21

## 2022-12-08 RX ADMIN — CEFTAROLINE FOSAMIL 600 MG: 600 POWDER, FOR SOLUTION INTRAVENOUS at 08:55

## 2022-12-08 RX ADMIN — INSULIN DETEMIR 20 UNITS: 100 INJECTION, SOLUTION SUBCUTANEOUS at 20:53

## 2022-12-08 RX ADMIN — ACETAMINOPHEN 1000 MG: 500 TABLET, FILM COATED ORAL at 14:40

## 2022-12-08 RX ADMIN — PANTOPRAZOLE SODIUM 40 MG: 40 TABLET, DELAYED RELEASE ORAL at 05:21

## 2022-12-08 RX ADMIN — VANCOMYCIN HYDROCHLORIDE 1250 MG: 10 INJECTION, POWDER, LYOPHILIZED, FOR SOLUTION INTRAVENOUS at 10:24

## 2022-12-08 RX ADMIN — VANCOMYCIN HYDROCHLORIDE 1250 MG: 10 INJECTION, POWDER, LYOPHILIZED, FOR SOLUTION INTRAVENOUS at 23:02

## 2022-12-08 RX ADMIN — MIDODRINE HYDROCHLORIDE 2.5 MG: 5 TABLET ORAL at 11:54

## 2022-12-08 RX ADMIN — PREGABALIN 150 MG: 150 CAPSULE ORAL at 08:56

## 2022-12-08 RX ADMIN — ASPIRIN 81 MG: 81 TABLET, COATED ORAL at 20:53

## 2022-12-08 RX ADMIN — ACETAMINOPHEN 1000 MG: 500 TABLET, FILM COATED ORAL at 05:21

## 2022-12-08 RX ADMIN — ACETAMINOPHEN 1000 MG: 500 TABLET, FILM COATED ORAL at 20:58

## 2022-12-08 RX ADMIN — Medication 10 ML: at 20:54

## 2022-12-08 NOTE — CASE MANAGEMENT/SOCIAL WORK
Continued Stay Note  [facility]     Patient Name: Melchor Hardwick III  MRN: [id number]  Today's Date: 12/8/2022    Admit Date: 11/30/2022    Plan: Ongoing   Discharge Plan     Row Name 12/08/22 1444       Plan    Plan Ongoing    Patient/Family in Agreement with Plan yes    Plan Comments Spoke with Sisi, admissions liaison with [facility], and they will be unable to accommodate Mr. Hardwick due to the cost of the Teflaro. I will speak with Mr. Hardwick to see where he would like me to send additional referrals. Case management will continue to follow.    Final Discharge Disposition Code 30 - still a patient               Discharge Codes    No documentation.               Expected Discharge Date and Time     Expected Discharge Date Expected Discharge Time    Dec 9, 2022             Gavin Collazo RN

## 2022-12-08 NOTE — PLAN OF CARE
Goal Outcome Evaluation:  Plan of Care Reviewed With: patient           Outcome Evaluation: pt rested well throughout night, no c/o pain, no co n/v/d, vss, no needs or concerns voiced at this time.

## 2022-12-08 NOTE — PLAN OF CARE
Patient has been resting with no complaints of pain. VSS.  Wound vac Removed by MD and Optifoam in place and remained CDI/  PICC in place .  Immobilizer used PRN. Ambulated 100 feet with PT with the use of walker/gaitbelt/cast shoe.  ROM at tolerated. Will coninue to monitor

## 2022-12-08 NOTE — PLAN OF CARE
Goal Outcome Evaluation:  Plan of Care Reviewed With: patient        Progress: improving  Outcome Evaluation: Patient demonstrated improving overall strength and endurance with ambulation. R knee ROM remains stiff limited by tight quads.

## 2022-12-08 NOTE — PROGRESS NOTES
[facility] Progress Note        Antibiotics:  Anti-Infectives (From admission, onward)    Ordered     Dose/Rate Route Frequency Start Stop    12/06/22 2158  vancomycin 1250 mg/250 mL 0.9% NS IVPB ([facility])        Ordering Provider: Bryce Euceda MD    1,250 mg  over 90 Minutes Intravenous Every 12 Hours 12/06/22 2300 12/09/22 1059    12/02/22 1159  ceftaroline (TEFLARO) 600 mg/100 mL 0.9% NS ([facility])        Ordering Provider: Kushal Balderrama RPH    600 mg Intravenous Every 12 Hours Scheduled 12/02/22 1500 12/09/22 2059    12/01/22 0933  vancomycin 1750 mg/500 mL 0.9% NS IVPB ([facility])        Ordering Provider: Bryce Euceda MD    20 mg/kg × 88.9 kg  over 120 Minutes Intravenous Once 12/01/22 1030 12/01/22 1310    12/01/22 0919  Pharmacy to dose vancomycin        Ordering Provider: Bryce Euceda MD     Does not apply Continuous PRN 12/01/22 0919 12/29/22 0918    11/30/22 1449  ceFAZolin in dextrose (ANCEF) IVPB solution 2 g        Ordering Provider: Brain Bentley MD    2 g  over 30 Minutes Intravenous Every 8 Hours 11/30/22 1800 12/01/22 0151    11/30/22 0815  ceFAZolin in dextrose (ANCEF) IVPB solution 2 g        Ordering Provider: Brain Bentley MD    2 g  over 30 Minutes Intravenous Once 11/30/22 0817 11/30/22 1045    11/30/22 0815  vancomycin 1250 mg/250 mL 0.9% NS IVPB ([facility])        Ordering Provider: Brain Bentley MD    15 mg/kg × 88.9 kg Intravenous Once 11/30/22 0817 11/30/22 0902          CC: fatigue    HPI:    Patient is a 57 y.o.  Yr old male with history of cirrhosis/diabetes and other comorbidity as outlined below.  History of left foot gas gangrene with osteomyelitis and MRSA bacteremia treated in [place] by Dr. Giordano in May/June 2022.  Family reports left transmetatarsal amputation in approximately 8 weeks IV vancomycin.  Notes from [facility] indicate transthoracic echocardiogram no vegetation per Dr. Giordano; he thinks he might of had several weeks of oral antibiotic after that but not entirely  clear.  He is admitted to [facility] September 20 with progressive right knee pain occurring over the past week preceding admission.  He thinks he might of twisted it but ended up with progressive redness/swelling and pain.  No specific blunt force or penetrating trauma.  he was evaluated by orthopedics and taken to the operating room for right knee incision/drainage and concern for septic arthritis per Dr. Bentley; blood cultures had  MRSA.  Had dysuria but urinalysis not consistent with UTI.  No hematuria or pyuria     9/23 with TV echodensity; 9/26/22  TYRESE no vegetation per cardiology; daptomycin maintained, transferred to [place] with care taken over by Dr. Giordano; repeat blood cultures there on October 18 and October 20 and October 22 (dapto STU = 1)  with MRSA; blood cultures on October 24 and November 12 were negative per microbiology.  Repeat blood cultures November 20 with MRSA and daptomycin STU equal to 3, not susceptible per my discussion with microbiology; blood cultures positive again on November 22 but negative November 23. per Dr Giordano, teflaro had been added and concern regarding right knee with increased swelling/pain prompted transitioned back to [place] for further right knee surgery on November 30 by Dr Bentley     **I d/w Dr Giordano; he felt there was an obvious right arm PICC line infection in October with changes at the exit site and concern it was the reason for positive blood cultures at that time.  No catheter tip culture available per micro.  In November, recurrent bacteremia associated with right knee swelling/pain but no other focal symptoms per my discussion with Dr. Giordano; at risk for sequestered/metastatic focus    12/5/22 d/w Dr Giordano and  plans to get back to [facility];  Dr Giordano will facilitate additional radiographic workup if needed depending on clinical course    12/8/22 discussed with Dr. Giordano and Dr SISSY carmona ; doing okay overall. he denies any new focal pain or  symptomatology.postop right knee with controlled pain,  dull at present,  Better controlled per nursing overall, nonradiating, worse with movement, better with pain meds and 2 out of 10 in severity at present; he denies any other focal musculoskeletal pain.  No new back pain but he does relate chronic lumbar pain, dull at present, worse with movement, not progressive and no new weakness or numbness. No myalgia     Left foot with no redness/swelling or pain.  Surgical site healed with no open wound or active drainage. Has a left arm midline     No headache photophobia or neck stiffness.  No nausea vomiting diarrhea or abdominal pain.  No shortness of breath cough or hemoptysis.  No other new skin rash.  No other recent procedures or interventions.    No indwelling pacemaker        ROS:      12/8/22 No f/c/s. No n/v/d. No rash. No new ADR to Abx.     Constitutional-- No Fever, chills or sweats.  Appetite diminished with fatigue.  Heent-- No new vision, hearing or throat complaints.  No epistaxis or oral sores.  Denies odynophagia or dysphagia.  No flashers, floaters or eye pain.   No headache, photophobia or neck stiffness.  CV-- No chest pain, palpitation or syncope  Resp-- No SOB/cough/Hemoptysis  GI- No nausea, vomiting, or diarrhea.  No hematochezia, melena, or hematemesis. Denies jaundice  -- No dysuria, hematuria, or flank pain.  Denies hesitancy, urgency or flank pain.  Lymph- no swollen lymph nodes in neck/axilla or groin.   Heme- No active bruising or bleeding; no Hx of DVT or PE.  MS-- no swelling or pain in the bones or joints of arms/legs.  No new back pain.  Neuro-- No acute focal weakness or numbness in the arms or legs.  No seizures.     Full 12 point review of systems reviewed and negative otherwise for acute complaints, except for above      PE: nursing/out from present  /56 (BP Location: Left arm, Patient Position: Lying)   Pulse 67   Temp 97.9 °F (36.6 °C) (Oral)   Resp 16   Ht 177.8 cm  "(70\")   Wt 88.9 kg (196 lb)   SpO2 96%   BMI 28.12 kg/m²     GENERAL: Awake and alert, in no acute distress.  Chronically ill-appearing  HEENT: Normocephalic, atraumatic.    No conjunctival injection. No icterus. Oropharynx clear without evidence of thrush or exudate. No evidence of peridontal disease.    NECK: Supple without nuchal rigidity. No mass.   HEART: RRR; soft murmur LSB, rubs, gallops.   LUNGS: Diminished at bases but otherwise clear to auscultation bilaterally without wheezing, rales, rhonchi. Normal respiratory effort. Nonlabored. No dullness.  ABDOMEN: Soft, nontender, nondistended. Positive bowel sounds. No rebound or guarding. NO mass or HSM.  EXT:  No cyanosis, clubbing or edema. No cord.   MSK: FROM without joint effusions noted arms/legs.    SKIN: Warm and dry without cutaneous eruptions on Inspection/palpation.    NEURO: Oriented to PPT. No focal deficits on motor/sensory exam at arms/legs.     Left foot with no redness induration or warmth.  No discrete mass bulge or fluctuance.  No crepitus or bulla.  Prior surgical site healed at [facility].  No open wound or active drainage     Right knee postop surgical site no new redness or purulence.    No discrete mass bulge or fluctuance.  No crepitus or bulla. Better range of motion     No peripheral stigmata/phenomena of endocarditis     IV without obvious redness or drainage    Laboratory Data    Results from last 7 days   Lab Units 12/06/22  0534 12/02/22  0431 12/01/22  0824   WBC 10*3/mm3  --   --  9.88   HEMOGLOBIN g/dL 7.3* 7.9* 10.2*   HEMATOCRIT % 23.3* 25.0* 31.6*   PLATELETS 10*3/mm3  --   --  139*     Results from last 7 days   Lab Units 12/07/22  0432   SODIUM mmol/L 139   POTASSIUM mmol/L 4.3   CHLORIDE mmol/L 105   CO2 mmol/L 27.0   BUN mg/dL 13   CREATININE mg/dL 0.70*   GLUCOSE mg/dL 151*   CALCIUM mg/dL 7.7*                   Estimated Creatinine Clearance: 130.8 mL/min (A) (by C-G formula based on SCr of 0.7 mg/dL " "(L)).      Microbiology:      Radiology:  Imaging Results (Last 72 Hours)     ** No results found for the last 72 hours. **            Impression:       --Acute/recurrent/persistent MRSA bacteremia/septicemia; prior history positive cultures May 2022 and recurrent despite prior  IV vancomycin and left foot surgery in addition to other supportive measures.   TTE with ? TV echodensity prior admit in September and TYRESE no vegetation at that time at [facility];   blood culture positivity again at [facility] in October/November as above.  Daptomycin STU increased to 3 on 11/20 isolate and not sensitive to daptomycin per microbiology.  Discussed on several occasions with Dr. Giordano; had a repeat TYRESE on October 31 with mention of \" moderate size echodense structure above the posterior tricuspid leaflet but not attached to the leaflet\" per cardiology there, described potentially as a \"normal variant\" per their note.  High risk for further serious morbidity and other serious sequela including persistent/progressive or recurrent infection, metastatic foci of involvement and other dire consequences;  vancomycin sensitivity maintained albeit with STU =2; adjusted to vancomycin/ceftaroline in light of daptomycin resistance and I asked microbiology     **Blood cultures November 23 negative so far    **repeat TYRESE 12/1 ; no definitive vegetation per cardiology and only mild tricuspid valve regurgitation stable from prior exam per them.  Ill-defined area in the right atrium appeared stable to them. D/w Dr Trent; you still may need to consider PET scan and likely to require repeat echocardiography during his course.  I have discussed with Dr. Giordano and he will arrange     --Acute right knee pain/septic arthritis with surgical intervention, incision/debridement by Dr. Bentley on September 21.   MRSA+ in the setting of MRSA bacteremia at that time;  Repeat surgery 11/30, d/w Dr Bentley. Cultures November 30 negative     --History of " cirrhosis by records.  Unclear etiology.   further GI referral/eval per  medicine team     --Diabetes.  You need to tightly control blood sugar to give best chance for healing.;  Described as uncontrolled by medicine team at admission     -- Chronic lumbar back pain for decades.  This is not new, not worse and no new location.  No new exacerbation or new neurologic symptomatology in his extremities.  MRI 12/3 suboptimal per radiology;  may require further imaging depending on his clinical course to help exclude any other sequestered/spinal-paraspinal focus depending on clinical course     --TCP ; monitor     PLAN:       -- IV vancomycin/ceftaroline potentially 6 weeks but to depend on clinical course/study results and response to therapy.  High risk for occult endovascular focus with multiple past positive blood cultures, most recently November 20 and 22; after IV antibiotics, depending on clinical course, may require chronic oral suppression step down (likely to be decided by Dr. Giordano )     -- Check/review labs cultures and scans     -- Partial history per nursing staff     --d/w pharmacy       -- Highly complex set of issues with high risk for further serious morbidity and other serious sequela     -- Discussed with microbiology; they are doing further assessment to blood cultures drawn November 22     --d/w Dr Giordano and Dr SHEEHAN;  Dr Giordano to help with disposition       --  new PICC line,   No specific new pain or other suppurative changes at the surface at either site.  I discussed with Dr. Trent regarding additional diagnostics for endocarditis such as PET scan; this still may need to be considered as outpatient depending on clinical course.  see above.     **MRI's noted without specific focus at Lumbar spine or foot although spinal imaging suboptimal per radiology; I d/w Dr Giordano with plans for transition back to [facility] there; depending on clinical course/symptomatology, Dr. Giordano will consider white blood  cell scan versus repeat imaging to evaluate for potential occult focus.    **Copied text in this note has been reviewed and is accurate as of 12/08/22.        Bryce Euceda MD  12/8/2022

## 2022-12-08 NOTE — THERAPY TREATMENT NOTE
Patient Name: Melchor Hardwick III  : 1965    MRN: [id number]                              Today's Date: 2022       Admit Date: 2022    Visit Dx:     ICD-10-CM ICD-9-CM   1. Infection of right knee (HCC)  M00.9 686.9     Patient Active Problem List   Diagnosis   • Hyperlipidemia   • Hypertensive disorder   • Obesity   • Type 2 diabetes mellitus with hyperglycemia, with long-term current use of insulin (HCC)   • Gas gangrene of foot (HCC)   • Cellulitis in diabetic foot (HCC)   • Other acute osteomyelitis of left foot (HCC)   • Osteomyelitis (HCC)   • Critical illness myopathy   • Staphylococcal arthritis of right knee (HCC)   • Other cirrhosis of liver (HCC)   • MRSA bacteremia   • Endocarditis of tricuspid valve   • Wound of right buttock   • History of osteomyelitis   • Debility   • Infection of right knee (HCC)   • S/P right knee implant removal with insertion of antibiotic spacer   • Hypothyroid     Past Medical History:   Diagnosis Date   • Diabetes mellitus (HCC)     4 times per day gets checked for sugar at rehab   • Dizzy    • Hyperlipidemia    • Hypertension    • Hypothyroid 2022   • MRSA infection     blood -      • Orthostatic hypotension    • Pressure sore on buttocks     top crack -  sees wound - but now rn take care of it at rehab - minor opening - dime size - pat nurse didnt assess-  pt states getting better   • Pyogenic arthritis of right knee joint, due to unspecified organism (HCC) 2022   • Sepsis (HCC)    • Wears glasses     readers     Past Surgical History:   Procedure Laterality Date   • ABSCESS DRAINAGE  2004    PERINEUM   • AMPUTATION FOOT Left 2022    Procedure: AMPUTATION FOOT;  Surgeon: Addison Colby MD;  Location: [facility];  Service: Podiatry;  Laterality: Left;   • INCISION AND DRAINAGE LEG Left 05/10/2022    Procedure: INCISION AND DRAINAGE LOWER EXTREMITY;  Surgeon: Addison Colby MD;  Location: [facility] OR;  Service: Podiatry;  Laterality:  Left;   • KNEE INCISION AND DRAINAGE Right 09/21/2022    Procedure: KNEE INCISION AND DRAINAGE RIGHT;  Surgeon: Brain Bentley MD;  Location:  [facility] OR;  Service: Orthopedics;  Laterality: Right;   • PERIPHERALLY INSERTED CENTRAL CATHETER INSERTION Left    • TOTAL KNEE  PROSTHESIS REMOVAL W/ SPACER INSERTION Right 11/30/2022    Procedure: ANTIBIOTIC SPACER PLACEMENT KNEE - RIGHT;  Surgeon: Brain Bentley MD;  Location:  [facility] OR;  Service: Orthopedics;  Laterality: Right;   • WOUND DEBRIDEMENT Left 05/13/2022    Procedure: DEBRIDEMENT FOOT;  Surgeon: Addison Colby MD;  Location:  [facility] OR;  Service: Podiatry;  Laterality: Left;      General Information     [facility] Name 12/08/22 1404          Physical Therapy Time and Intention    Document Type therapy note (daily note)  -SC     Mode of Treatment physical therapy  -[facility] Name 12/08/22 1404          General Information    Patient Profile Reviewed yes  -SC     Existing Precautions/Restrictions --  abdominal binder to assist with low BP. no brace needed, Left LE trans met amputration- boot on for walking  -SC     Row Name 12/08/22 1404          Cognition    Orientation Status (Cognition) oriented x 4  -[facility] Name 12/08/22 1404          Safety Issues, Functional Mobility    Impairments Affecting Function (Mobility) endurance/activity tolerance;range of motion (ROM);postural/trunk control;strength  -SC     Comment, Safety Issues/Impairments (Mobility) alert, following commands  -SC           User Key  (r) = Recorded By, (t) = Taken By, (c) = Cosigned By    Initials Name Provider Type    SC Orion Reyes PT Physical Therapist               Mobility     Row Name 12/08/22 1406          Bed Mobility    Bed Mobility scooting/bridging;supine-sit  -SC     Scooting/Bridging [place] (Bed Mobility) independent  -SC     Supine-Sit [place] (Bed Mobility) modified independence  -SC     Assistive Device (Bed Mobility) head of bed elevated;bed rails  -SC      Comment, (Bed Mobility) extra time needed to get to edge of bed  -[facility] Name 12/08/22 1406          Transfers    Comment, (Transfers) STS from EOB with good technique  -[facility] Name 12/08/22 1406          Sit-Stand Transfer    Sit-Stand [place] (Transfers) standby assist;verbal cues  -SC     Assistive Device (Sit-Stand Transfers) walker, front-wheeled  -[facility] Name 12/08/22 1406          Gait/Stairs (Locomotion)    [place] Level (Gait) contact guard  -SC     Assistive Device (Gait) walker, front-wheeled  -SC     Distance in Feet (Gait) 110  -SC     Deviations/Abnormal Patterns (Gait) stride length decreased;weight shifting decreased;yao decreased;gait speed decreased;bilateral deviations  -SC     Bilateral Gait Deviations forward flexed posture;heel strike decreased  -SC     Comment, (Gait/Stairs) Gt training without knee immobilizer focused on step through gait pattern. Cues for upright posure. Demonstrated good technique. NO buckling noted  -[facility] Name 12/08/22 1406          Mobility    Extremity Weight-bearing Status left lower extremity;right lower extremity  -SC     Left Lower Extremity (Weight-bearing Status) weight-bearing as tolerated (WBAT);other (see comments)  -SC     Right Lower Extremity (Weight-bearing Status) weight-bearing as tolerated (WBAT)  -SC           User Key  (r) = Recorded By, (t) = Taken By, (c) = Cosigned By    Initials Name Provider Type    SC Orion Reyes PT Physical Therapist               Obj/Interventions     [facility] Name 12/08/22 1408          Motor Skills    Therapeutic Exercise knee  -[facility] Name 12/08/22 1408          Knee (Therapeutic Exercise)    Knee Isometrics (Therapeutic Exercise) right;quad sets;10 repetitions;3 second hold  -SC     Knee Strengthening (Therapeutic Exercise) right;heel slides;SAQ (short arc quad);LAQ (long arc quad);10 repetitions;SLR (straight leg raise)  gentle tib/distal femoral A/P glides x5  -[facility] Name 12/08/22  1408          Balance    Dynamic Standing Balance 1-person assist;1 person to manage equipment;verbal cues;contact guard  -SC     Position/Device Used, Standing Balance supported;walker, rolling  -SC           User Key  (r) = Recorded By, (t) = Taken By, (c) = Cosigned By    Initials Name Provider Type    Orion Mcknight, PT Physical Therapist               Goals/Plan    No documentation.                Clinical Impression     Row Name 12/08/22 1416          Pain    Additional Documentation Pain Scale: FACES Pre/Post-Treatment (Group)  -SC     Row Name 12/08/22 1416          Pain Scale: FACES Pre/Post-Treatment    Pain: FACES Scale, Pretreatment 2-->hurts little bit  -SC     Posttreatment Pain Rating 4-->hurts little more  -SC     Pain Location - Side/Orientation Right  -SC     Pain Location - knee  -[facility] 12/08/22 1416          Plan of Care Review    Plan of Care Reviewed With patient  -SC     Progress improving  -SC     Outcome Evaluation Patient demonstrated improving overall strength and endurance with ambulation. R knee ROM remains stiff limited by tight quads.  -SC     Row Name 12/08/22 1416          Therapy Assessment/Plan (PT)    Rehab Potential (PT) good, to achieve stated therapy goals  -SC     Criteria for Skilled Interventions Met (PT) yes;meets criteria;skilled treatment is necessary  -SC     Therapy Frequency (PT) daily  -SC     Row Name 12/08/22 1416          Positioning and Restraints    Pre-Treatment Position in bed  -SC     Post Treatment Position chair  -SC     In Chair notified nsg;reclined;sitting;call light within reach;encouraged to call for assist;exit alarm on  -SC           User Key  (r) = Recorded By, (t) = Taken By, (c) = Cosigned By    Initials Name Provider Type    SC Orion Reyes, PT Physical Therapist               Outcome Measures     [facility] Name 12/08/22 1417 12/08/22 0856       How much help from another person do you currently need...    Turning from your back to your  side while in flat bed without using bedrails? 4  -SC 4  -SCA    Moving from lying on back to sitting on the side of a flat bed without bedrails? 4  -SC 4  -SCA    Moving to and from a bed to a chair (including a wheelchair)? 3  -SC 3  -SCA    Standing up from a chair using your arms (e.g., wheelchair, bedside chair)? 3  -SC 3  -SCA    Climbing 3-5 steps with a railing? 2  -SC 2  -SCA    To walk in hospital room? 3  -SC 3  -SCA    AM-PAC 6 Clicks Score (PT) 19  -SC 19  -[facility]    Highest level of mobility 6 --> Walked 10 steps or more  -SC 6 --> Walked 10 steps or more  -[facility]    Row Name 12/08/22 1417          Functional Assessment    Outcome Measure Options AM-PAC 6 Clicks Basic Mobility (PT)  -SC           User Key  (r) = Recorded By, (t) = Taken By, (c) = Cosigned By    Initials Name Provider Type    SC Orion Reyes, PT Physical Therapist    Naye Jacobs RN Registered Nurse                             Physical Therapy Education     Title: PT OT SLP Therapies (Done)     Topic: Physical Therapy (Done)     Point: Mobility training (Done)     Learning Progress Summary           Patient Eager, E, VU by SC at 12/8/2022 1418    Comment: reviewed benefits of activity    Eager, E, VU,NR by SC at 12/7/2022 1156    Comment: reviewed ROM exercise    Eager, E, VU,DU,NR by  at 12/6/2022 1637    Comment: Reviewed safety/technique with transfers, ambulation, HEP, PT POC    Eager, E, VU,DU,NR by  at 12/5/2022 1428    Comment: Reviewed safety/technique with bed mobility, transfers, ambulation, HEP, PT POC    Acceptance, E,TB, VU by  at 12/4/2022 1003    Acceptance, E,TB, VU by  at 12/3/2022 1137    Acceptance, E, NR by AS at 12/2/2022 1051    Eager, E, VU,DU,NR by  at 12/1/2022 0946    Comment: Educated pt. safety/technique with bed mobility, transfers, ambulation, WB status, use of knee immobilizer, PT POC                   Point: Home exercise program (Done)     Learning Progress Summary           Patient  Eager, E, VU by SC at 12/8/2022 1418    Comment: reviewed benefits of activity    Eager, E, VU,NR by SC at 12/7/2022 1156    Comment: reviewed ROM exercise    Eager, E, VU,DU,NR by  at 12/6/2022 1637    Comment: Reviewed safety/technique with transfers, ambulation, HEP, PT POC    Eager, E, VU,DU,NR by  at 12/5/2022 1428    Comment: Reviewed safety/technique with bed mobility, transfers, ambulation, HEP, PT POC    Acceptance, E,TB, VU by  at 12/4/2022 1003    Acceptance, E,TB, VU by  at 12/3/2022 1137    Acceptance, E, NR by AS at 12/2/2022 1051    Eager, E, VU,DU,NR by  at 12/1/2022 0946    Comment: Educated pt. safety/technique with bed mobility, transfers, ambulation, WB status, use of knee immobilizer, PT POC                   Point: Body mechanics (Done)     Learning Progress Summary           Patient Eager, E, VU by SC at 12/8/2022 1418    Comment: reviewed benefits of activity    Eager, E, VU,NR by SC at 12/7/2022 1156    Comment: reviewed ROM exercise    Eager, E, VU,DU,NR by  at 12/6/2022 1637    Comment: Reviewed safety/technique with transfers, ambulation, HEP, PT POC    Eager, E, VU,DU,NR by  at 12/5/2022 1428    Comment: Reviewed safety/technique with bed mobility, transfers, ambulation, HEP, PT POC    Acceptance, E,TB, VU by  at 12/4/2022 1003    Acceptance, E,TB, VU by  at 12/3/2022 1137    Acceptance, E, NR by AS at 12/2/2022 1051    Eager, E, VU,DU,NR by  at 12/1/2022 0946    Comment: Educated pt. safety/technique with bed mobility, transfers, ambulation, WB status, use of knee immobilizer, PT POC                   Point: Precautions (Done)     Learning Progress Summary           Patient Eager, E, VU by SC at 12/8/2022 1418    Comment: reviewed benefits of activity    Eager, E, VU,NR by SC at 12/7/2022 1156    Comment: reviewed ROM exercise    YANELY Junior, FLORECITA,RAH,NR by  at 12/6/2022 1637    Comment: Reviewed safety/technique with transfers, ambulation, HEP, PT POC    YANELY Junior,  VU,DU,NR by  at 12/5/2022 1428    Comment: Reviewed safety/technique with bed mobility, transfers, ambulation, HEP, PT POC    Acceptance, E,TB, VU by  at 12/4/2022 1003    Acceptance, E,TB, VU by  at 12/3/2022 1137    Acceptance, E, NR by AS at 12/2/2022 1051    Eager, E, VU,DU,NR by  at 12/1/2022 0946    Comment: Educated pt. safety/technique with bed mobility, transfers, ambulation, WB status, use of knee immobilizer, PT POC                               User Key     Initials Effective Dates Name Provider Type Discipline    SC 06/16/21 -  Orion Reyes, PT Physical Therapist PT    AS 06/16/21 -  Ana Cristina Gutierrez, PTA Physical Therapist Assistant PT     09/22/20 -  Ehsan De La Cruz, PT Physical Therapist PT     06/01/21 -  Susan Quesada, PT Physical Therapist PT              PT Recommendation and Plan     Plan of Care Reviewed With: patient  Progress: improving  Outcome Evaluation: Patient demonstrated improving overall strength and endurance with ambulation. R knee ROM remains stiff limited by tight quads.     Time Calculation:    PT Charges     Row Name 12/08/22 1326             Time Calculation    Start Time 1326  -SC      PT Received On 12/08/22  -SC      PT Goal Re-Cert Due Date 12/14/22  -SC         Time Calculation- PT    Total Timed Code Minutes- PT 25 minute(s)  -SC         Timed Charges    18923 - PT Therapeutic Exercise Minutes 5  -SC      86944 - Gait Training Minutes  15  -SC      73713 - PT Therapeutic Activity Minutes 5  -SC         Total Minutes    Timed Charges Total Minutes 25  -SC       Total Minutes 25  -SC            User Key  (r) = Recorded By, (t) = Taken By, (c) = Cosigned By    Initials Name Provider Type    SC Eric, Orion LAWS, PT Physical Therapist              Therapy Charges for Today     Code Description Service Date Service Provider Modifiers Qty    92368882297 HC PT THER PROC EA 15 MIN 12/7/2022 Orion Reyes, PT GP 1    38361359525 HC PT THER PROC EA 15 MIN  12/8/2022 Orion Reyes, PT GP 1    48373159220 HC GAIT TRAINING EA 15 MIN 12/8/2022 Orion Reyes PT GP 1          PT G-Codes  Outcome Measure Options: AM-PAC 6 Clicks Basic Mobility (PT)  AM-PAC 6 Clicks Score (PT): 19  AM-PAC 6 Clicks Score (OT): 16  PT Discharge Summary  Anticipated Discharge Disposition (PT): inpatient rehabilitation facility    Orion Reyes, PT  12/8/2022

## 2022-12-08 NOTE — PROGRESS NOTES
"IM progress note      Melchor Hardwick III  [id number]  1965     LOS: 8 days     Attending: Brain Bentley MD    Primary Care Provider: Missy Up APRN      Chief Complaint/Reason for visit:  No chief complaint on file.      Subjective   Doing well.  Good pain control.  No new complaints.   Awaits acceptance from LTAC.    Objective        Visit Vitals  /56 (BP Location: Left arm, Patient Position: Lying)   Pulse 67   Temp 97.9 °F (36.6 °C) (Oral)   Resp 16   Ht 177.8 cm (70\")   Wt 88.9 kg (196 lb)   SpO2 96%   BMI 28.12 kg/m²     Temp (24hrs), Av.1 °F (36.7 °C), Min:97.9 °F (36.6 °C), Max:98.3 °F (36.8 °C)      Intake/Output:    Intake/Output Summary (Last 24 hours) at 2022 1115  Last data filed at 2022 0900  Gross per 24 hour   Intake 720 ml   Output 2150 ml   Net -1430 ml         Physical Exam:     General Appearance:    Alert, cooperative, in no acute distress   Head:    Normocephalic, without obvious abnormality, atraumatic    Lungs:     Normal effort, symmetric chest rise,  clear to      auscultation bilaterally              Heart:    Regular rhythm and normal rate, normal S1 and S2    Abdomen:     Normal bowel sounds, no masses, no organomegaly, soft        non-tender, non-distended, no guarding, no rebound                tenderness.  Abdominal binder in place   Extremities: CDI optifoam over knee incision.      Pulses:   Pulses palpable and equal bilaterally   Skin:   No bleeding, bruising or rash          Results Review:     I reviewed the patient's new clinical results.   Results from last 7 days   Lab Units 22  0534 22  0431   HEMOGLOBIN g/dL 7.3* 7.9*   HEMATOCRIT % 23.3* 25.0*     Results from last 7 days   Lab Units 22  0432 22  0534 22  0441   SODIUM mmol/L 139 136 137   POTASSIUM mmol/L 4.3 4.1 4.2   CHLORIDE mmol/L 105 103 104   CO2 mmol/L 27.0 28.0 28.0   BUN mg/dL 13 13 11   CREATININE mg/dL 0.70* 0.72* 0.56*   CALCIUM mg/dL 7.7* 8.0* 8.0* "   GLUCOSE mg/dL 151* 132* 198*   TYRESE   •  Indication for TYRESE -to rule out infective endocarditis in a patient with MRSA bacteremia.  •  Findings-  •  Left ventricular systolic function is normal. Estimated left ventricular EF = 60%  •  Left atrial appendage is well visualized and is free of thrombus.  •  Saline test was negative for the evidence of shunt in interatrial septum.  •  The tricuspid valve appeared normal in structure. There was a moderate sized echodense structure seen above  the posterior tricuspid leaflet but not attached to the leaflet. The appearance and location are not typical for regurgitation. This structure could be a normal variant. No evidence of tricuspid valve stenosis or significant regurgitations.  •  Other valves also appear normal in structure with no evidence of stenosis or significant regurgitations.  No vegetations seen on these valves.  •  There is no evidence of pericardial effusion.  •  Comment-  •  In view of presence of MRSA bacteremia, recommend to extend antibiotic therapy for possible infective endocarditis and repeat TYRESE in 3 months.         Latest Reference Range & Units 12/07/22 11:15 12/07/22 16:28 12/07/22 22:21 12/08/22 07:29   Glucose 70 - 130 mg/dL 123 249 (H) 217 (H) 78   (H): Data is abnormally high       All medications reviewed.   acetaminophen, 1,000 mg, Oral, Q8H  aspirin, 81 mg, Oral, Q12H  ceftaroline, 600 mg, Intravenous, Q12H  insulin detemir, 20 Units, Subcutaneous, Q12H  insulin lispro, 0-7 Units, Subcutaneous, TID AC  Insulin Lispro, 7 Units, Subcutaneous, TID With Meals  levothyroxine, 25 mcg, Oral, Q AM  meloxicam, 15 mg, Oral, Daily  midodrine, 2.5 mg, Oral, TID AC  pantoprazole, 40 mg, Oral, Q AM  polyethylene glycol, 17 g, Oral, Daily  pregabalin, 150 mg, Oral, Q12H  sodium chloride, 10 mL, Intravenous, Q12H  vancomycin, 1,250 mg, Intravenous, Q12H      cyclobenzaprine, 5 mg, TID PRN  dextrose, 25 g, Q15 Min PRN  dextrose, 15 g, Q15 Min  PRN  diphenhydrAMINE, 25 mg, Q6H PRN   Or  diphenhydrAMINE, 25 mg, Q6H PRN  glucagon (human recombinant), 1 mg, Q15 Min PRN  HYDROmorphone, 0.5 mg, Q2H PRN   And  naloxone, 0.1 mg, Q5 Min PRN  labetalol, 10 mg, Q4H PRN  ondansetron, 4 mg, Q6H PRN   Or  ondansetron, 4 mg, Q6H PRN  oxyCODONE, 10 mg, Q4H PRN  oxyCODONE, 5 mg, Q4H PRN  Pharmacy to dose vancomycin, , Continuous PRN  sodium chloride, 500 mL, TID PRN  sodium chloride, 10 mL, PRN        Assessment & Plan       S/P right knee implant removal with insertion of antibiotic spacer    Type 2 diabetes mellitus with hyperglycemia, with long-term current use of insulin (HCC)    Infection of right knee (HCC)    Hypothyroid  MRSA sepsis  Chronic low back pain  S/p PICC placement.      Plan  1. PT/OT, weightbearing per protocol.  2. Pain control-prns   3. IS-encouraged  4. DVT proph-mechanicals, aspirin.  5. Bowel regimen  6. Monitor post-op labs  7. DC planning, to [facility]AC at [facility],  is following, referral is sent.     Hypothyroid  -Continue home Synthroid     DM  - hgb A1c on 11/28/2022 7.3  -Continue home bolus insulin with meals and long-acting insulin twice daily/ doses increased 12/1.   - Accu-Chek AC and HS with low dose SSI    TYRESE, done, noted, no obvious vegetation.    No infectious focus evident on MRIs.    Antibiotics per ID, Dr. Euceda is following   Plan on tagged WBC scan here or at [facility].     Watch for adverse drug reactions.  Noted antibiotic regimen changed from daptomycin  No adverse drug reactions observed.      Rose Diaz MD  12/08/22  11:15 EST

## 2022-12-09 LAB
GLUCOSE BLDC GLUCOMTR-MCNC: 103 MG/DL (ref 70–130)
GLUCOSE BLDC GLUCOMTR-MCNC: 197 MG/DL (ref 70–130)

## 2022-12-09 PROCEDURE — 63710000001 INSULIN LISPRO (HUMAN) PER 5 UNITS: Performed by: INTERNAL MEDICINE

## 2022-12-09 PROCEDURE — 63710000001 INSULIN LISPRO (HUMAN) PER 5 UNITS: Performed by: NURSE PRACTITIONER

## 2022-12-09 PROCEDURE — 25010000002 CEFTAROLINE FOSAMIL PER 10 MG: Performed by: INTERNAL MEDICINE

## 2022-12-09 PROCEDURE — 82962 GLUCOSE BLOOD TEST: CPT

## 2022-12-09 PROCEDURE — 97116 GAIT TRAINING THERAPY: CPT

## 2022-12-09 PROCEDURE — 63710000001 INSULIN DETEMIR PER 5 UNITS: Performed by: INTERNAL MEDICINE

## 2022-12-09 PROCEDURE — 25010000002 VANCOMYCIN 10 G RECONSTITUTED SOLUTION

## 2022-12-09 RX ADMIN — VANCOMYCIN HYDROCHLORIDE 1250 MG: 10 INJECTION, POWDER, LYOPHILIZED, FOR SOLUTION INTRAVENOUS at 13:27

## 2022-12-09 RX ADMIN — INSULIN LISPRO 7 UNITS: 100 INJECTION, SOLUTION INTRAVENOUS; SUBCUTANEOUS at 17:38

## 2022-12-09 RX ADMIN — MIDODRINE HYDROCHLORIDE 2.5 MG: 5 TABLET ORAL at 08:43

## 2022-12-09 RX ADMIN — PANTOPRAZOLE SODIUM 40 MG: 40 TABLET, DELAYED RELEASE ORAL at 05:07

## 2022-12-09 RX ADMIN — Medication 10 ML: at 21:40

## 2022-12-09 RX ADMIN — ASPIRIN 81 MG: 81 TABLET, COATED ORAL at 08:43

## 2022-12-09 RX ADMIN — CEFTAROLINE FOSAMIL 600 MG: 600 POWDER, FOR SOLUTION INTRAVENOUS at 21:40

## 2022-12-09 RX ADMIN — CEFTAROLINE FOSAMIL 600 MG: 600 POWDER, FOR SOLUTION INTRAVENOUS at 08:42

## 2022-12-09 RX ADMIN — MELOXICAM 15 MG: 15 TABLET ORAL at 08:43

## 2022-12-09 RX ADMIN — Medication 10 ML: at 08:42

## 2022-12-09 RX ADMIN — PREGABALIN 150 MG: 150 CAPSULE ORAL at 21:39

## 2022-12-09 RX ADMIN — INSULIN LISPRO 3 UNITS: 100 INJECTION, SOLUTION INTRAVENOUS; SUBCUTANEOUS at 17:38

## 2022-12-09 RX ADMIN — PREGABALIN 150 MG: 150 CAPSULE ORAL at 08:43

## 2022-12-09 RX ADMIN — MIDODRINE HYDROCHLORIDE 2.5 MG: 5 TABLET ORAL at 17:38

## 2022-12-09 RX ADMIN — ACETAMINOPHEN 1000 MG: 500 TABLET, FILM COATED ORAL at 21:39

## 2022-12-09 RX ADMIN — LEVOTHYROXINE SODIUM 25 MCG: 25 TABLET ORAL at 05:07

## 2022-12-09 RX ADMIN — INSULIN DETEMIR 20 UNITS: 100 INJECTION, SOLUTION SUBCUTANEOUS at 21:40

## 2022-12-09 RX ADMIN — ACETAMINOPHEN 1000 MG: 500 TABLET, FILM COATED ORAL at 05:07

## 2022-12-09 RX ADMIN — INSULIN DETEMIR 20 UNITS: 100 INJECTION, SOLUTION SUBCUTANEOUS at 08:42

## 2022-12-09 RX ADMIN — MIDODRINE HYDROCHLORIDE 2.5 MG: 5 TABLET ORAL at 11:58

## 2022-12-09 RX ADMIN — ASPIRIN 81 MG: 81 TABLET, COATED ORAL at 21:39

## 2022-12-09 RX ADMIN — ACETAMINOPHEN 1000 MG: 500 TABLET, FILM COATED ORAL at 13:27

## 2022-12-09 NOTE — THERAPY TREATMENT NOTE
Patient Name: Melchor Hardwick III  : 1965    MRN: [id number]                              Today's Date: 2022       Admit Date: 2022    Visit Dx:     ICD-10-CM ICD-9-CM   1. Infection of right knee (HCC)  M00.9 686.9     Patient Active Problem List   Diagnosis   • Hyperlipidemia   • Hypertensive disorder   • Obesity   • Type 2 diabetes mellitus with hyperglycemia, with long-term current use of insulin (HCC)   • Gas gangrene of foot (HCC)   • Cellulitis in diabetic foot (HCC)   • Other acute osteomyelitis of left foot (HCC)   • Osteomyelitis (HCC)   • Critical illness myopathy   • Staphylococcal arthritis of right knee (HCC)   • Other cirrhosis of liver (HCC)   • MRSA bacteremia   • Endocarditis of tricuspid valve   • Wound of right buttock   • History of osteomyelitis   • Debility   • Infection of right knee (HCC)   • S/P right knee implant removal with insertion of antibiotic spacer   • Hypothyroid     Past Medical History:   Diagnosis Date   • Diabetes mellitus (HCC)     4 times per day gets checked for sugar at rehab   • Dizzy    • Hyperlipidemia    • Hypertension    • Hypothyroid 2022   • MRSA infection     blood -      • Orthostatic hypotension    • Pressure sore on buttocks     top crack -  sees wound - but now rn take care of it at rehab - minor opening - dime size - pat nurse didnt assess-  pt states getting better   • Pyogenic arthritis of right knee joint, due to unspecified organism (HCC) 2022   • Sepsis (HCC)    • Wears glasses     readers     Past Surgical History:   Procedure Laterality Date   • ABSCESS DRAINAGE  2004    PERINEUM   • AMPUTATION FOOT Left 2022    Procedure: AMPUTATION FOOT;  Surgeon: Addison Colby MD;  Location: [facility];  Service: Podiatry;  Laterality: Left;   • INCISION AND DRAINAGE LEG Left 05/10/2022    Procedure: INCISION AND DRAINAGE LOWER EXTREMITY;  Surgeon: Addison Colby MD;  Location: [facility] OR;  Service: Podiatry;  Laterality:  Left;   • KNEE INCISION AND DRAINAGE Right 09/21/2022    Procedure: KNEE INCISION AND DRAINAGE RIGHT;  Surgeon: Brain Bentley MD;  Location:  [facility] OR;  Service: Orthopedics;  Laterality: Right;   • PERIPHERALLY INSERTED CENTRAL CATHETER INSERTION Left    • TOTAL KNEE  PROSTHESIS REMOVAL W/ SPACER INSERTION Right 11/30/2022    Procedure: ANTIBIOTIC SPACER PLACEMENT KNEE - RIGHT;  Surgeon: Brain Bentley MD;  Location:  [facility] OR;  Service: Orthopedics;  Laterality: Right;   • WOUND DEBRIDEMENT Left 05/13/2022    Procedure: DEBRIDEMENT FOOT;  Surgeon: Addison Colby MD;  Location:  [facility] OR;  Service: Podiatry;  Laterality: Left;      General Information     Row Name 12/09/22 1609          Physical Therapy Time and Intention    Document Type therapy note (daily note)  -     Mode of Treatment physical therapy  -     Row Name 12/09/22 1609          General Information    Patient Profile Reviewed yes  -SS     Existing Precautions/Restrictions fall;other (see comments)  abdominal binder per orthostatic hypotension. L transmet amp w/walking boot for comfort  -     Barriers to Rehab medically complex;previous functional deficit  -SS     Row Name 12/09/22 1609          Cognition    Orientation Status (Cognition) oriented x 4  -SS     Row Name 12/09/22 1609          Safety Issues, Functional Mobility    Safety Issues Affecting Function (Mobility) positioning of assistive device;safety precautions follow-through/compliance;safety precaution awareness;sequencing abilities  -     Impairments Affecting Function (Mobility) endurance/activity tolerance;range of motion (ROM);postural/trunk control;strength;balance;pain  -SS           User Key  (r) = Recorded By, (t) = Taken By, (c) = Cosigned By    Initials Name Provider Type    SS Susan Quesada PT Physical Therapist               Mobility     Row Name 12/09/22 1613          Bed Mobility    Bed Mobility scooting/bridging;supine-sit;sit-supine  -      Scooting/Bridging [place] (Bed Mobility) modified independence  -SS     Supine-Sit [place] (Bed Mobility) modified independence  -SS     Sit-Supine [place] (Bed Mobility) modified independence  -SS     Assistive Device (Bed Mobility) head of bed elevated;bed rails  -     Comment, (Bed Mobility) increased time required w/heavy reliance on elevated head and handrails  -     Row Name 12/09/22 1613          Bed-Chair Transfer    Bed-Chair [place] (Transfers) verbal cues;contact guard  -     Assistive Device (Bed-Chair Transfers) walker, front-wheeled  -     Comment, (Bed-Chair Transfer) VC for sequencing  -     Row Name 12/09/22 1613          Sit-Stand Transfer    Sit-Stand [place] (Transfers) standby assist;verbal cues  -     Assistive Device (Sit-Stand Transfers) walker, front-wheeled  -     Comment, (Sit-Stand Transfer) VC for RLE placement  -     Row Name 12/09/22 1613          Gait/Stairs (Locomotion)    [place] Level (Gait) contact guard;verbal cues  -     Assistive Device (Gait) walker, front-wheeled  -     Distance in Feet (Gait) 120  -     Deviations/Abnormal Patterns (Gait) stride length decreased;weight shifting decreased;yao decreased;gait speed decreased;bilateral deviations  -     Bilateral Gait Deviations forward flexed posture;heel strike decreased  -     Comment, (Gait/Stairs) Pt. ambulated with a step to gait pattern improving to step through w/cueing. VC for upright posture, decreased reliance on BUE support, continuous forward progression of FWW. No buckling noted. Improving gait fluidity and gait speed but distance limited secondary to pain.  -     Row Name 12/09/22 1613          Mobility    Extremity Weight-bearing Status left lower extremity;right lower extremity  -     Left Lower Extremity (Weight-bearing Status) weight-bearing as tolerated (WBAT);other (see comments)  -     Right Lower Extremity (Weight-bearing Status)  weight-bearing as tolerated (WBAT)  -           User Key  (r) = Recorded By, (t) = Taken By, (c) = Cosigned By    Initials Name Provider Type     Susan Quesada PT Physical Therapist               Obj/Interventions     Row Name 12/09/22 1616          Motor Skills    Therapeutic Exercise knee;ankle  -     Row Name 12/09/22 1616          Knee (Therapeutic Exercise)    Knee (Therapeutic Exercise) AROM (active range of motion);isometric exercises;strengthening exercise  -     Knee AROM (Therapeutic Exercise) right;sitting;flexion;15 repititions  -     Knee Isometrics (Therapeutic Exercise) bilateral;quad sets;10 repetitions;5 repetitions  -     Knee Strengthening (Therapeutic Exercise) bilateral;heel slides;SLR (straight leg raise);LAQ (long arc quad);15 repititions;right;SAQ (short arc quad)  -     Row Name 12/09/22 1616          Ankle (Therapeutic Exercise)    Ankle (Therapeutic Exercise) AROM (active range of motion)  -     Ankle AROM (Therapeutic Exercise) bilateral;dorsiflexion;plantarflexion;10 repetitions;5 repetitions  -     Row Name 12/09/22 1616          Balance    Balance Assessment sitting static balance;sitting dynamic balance;sit to stand dynamic balance;standing static balance;standing dynamic balance  -     Static Sitting Balance modified independence  -     Dynamic Sitting Balance modified independence  -     Position, Sitting Balance unsupported;sitting edge of bed  -     Sit to Stand Dynamic Balance standby assist  -     Static Standing Balance contact guard  -     Dynamic Standing Balance contact guard  -     Position/Device Used, Standing Balance supported;walker, front-wheeled  -     Balance Interventions sitting;standing;sit to stand;supported;static;dynamic  -           User Key  (r) = Recorded By, (t) = Taken By, (c) = Cosigned By    Initials Name Provider Type     Susan Quesada PT Physical Therapist               Goals/Plan    No documentation.                 Clinical Impression     Row Name 12/09/22 1617          Pain    Pretreatment Pain Rating 0/10 - no pain  -     Posttreatment Pain Rating 5/10  -     Pain Location - Side/Orientation Right  -     Pain Location generalized  -     Pain Location - knee  -     Pre/Posttreatment Pain Comment increased pain w/WB and ambulation  -     Pain Intervention(s) Repositioned;Ambulation/increased activity;Elevated  declines cold pack  -     Additional Documentation Pain Scale: Numbers Pre/Post-Treatment (Group)  -[facility] Name 12/09/22 1617          Plan of Care Review    Plan of Care Reviewed With patient  -     Progress improving  -     Outcome Evaluation Pt. performed sit to stand transfer w/stand by assist. He ambulated 120' w/front wheeled walker, contact guard assist. Gait limited by increased pain. He tolerated ther-ex well. Will continue to progress as able.  -[facility] Name 12/09/22 1617          Therapy Assessment/Plan (PT)    Rehab Potential (PT) good, to achieve stated therapy goals  -     Criteria for Skilled Interventions Met (PT) yes;meets criteria;skilled treatment is necessary  -     Therapy Frequency (PT) daily  -[facility] Name 12/09/22 1617          Vital Signs    Pre Systolic BP Rehab 118  -SS     Pre Treatment Diastolic BP 59  -SS     Pretreatment Heart Rate (beats/min) 71  -SS     Pre SpO2 (%) 98  -SS     O2 Delivery Pre Treatment room air  -[facility] 12/09/22 1617          Positioning and Restraints    Pre-Treatment Position in bed  -     Post Treatment Position bed  -SS     In Bed notified nsg;call light within reach;encouraged to call for assist;exit alarm on;patient within staff view  -           User Key  (r) = Recorded By, (t) = Taken By, (c) = Cosigned By    Initials Name Provider Type     Susan Quesada, PT Physical Therapist               Outcome Measures     Row Name 12/09/22 1620 12/09/22 0857       How much help from another person do you currently need...     Turning from your back to your side while in flat bed without using bedrails? 4  -SS 4  -DF    Moving from lying on back to sitting on the side of a flat bed without bedrails? 4  -SS 4  -DF    Moving to and from a bed to a chair (including a wheelchair)? 3  -SS 3  -DF    Standing up from a chair using your arms (e.g., wheelchair, bedside chair)? 3  -SS 3  -DF    Climbing 3-5 steps with a railing? 2  -SS 2  -DF    To walk in hospital room? 3  -SS 3  -DF    AM-PAC 6 Clicks Score (PT) 19  -SS 19  -DF    Highest level of mobility 6 --> Walked 10 steps or more  -SS 6 --> Walked 10 steps or more  -DF    Row Name 12/09/22 1620          Functional Assessment    Outcome Measure Options AM-PAC 6 Clicks Basic Mobility (PT)  -           User Key  (r) = Recorded By, (t) = Taken By, (c) = Cosigned By    Initials Name Provider Type     Alysia Adam RN Registered Nurse    Susan Lemon, JANNETH Physical Therapist                             Physical Therapy Education     Title: PT OT SLP Therapies (Done)     Topic: Physical Therapy (Done)     Point: Mobility training (Done)     Learning Progress Summary           Patient Eager, E,H, VU,DU,NR by  at 12/9/2022 1620    Comment: Reviewed safety/technique with transfers, ambulation, HEP, PT POC    Eager, E, VU by SC at 12/8/2022 1418    Comment: reviewed benefits of activity    Eager, E, VU,NR by SC at 12/7/2022 1156    Comment: reviewed ROM exercise    Eager, E, VU,DU,NR by  at 12/6/2022 1637    Comment: Reviewed safety/technique with transfers, ambulation, HEP, PT POC    Eager, E, VU,DU,NR by  at 12/5/2022 1428    Comment: Reviewed safety/technique with bed mobility, transfers, ambulation, HEP, PT POC    Acceptance, E,TB, VU by  at 12/4/2022 1003    Acceptance, E,TB, VU by  at 12/3/2022 1137    Acceptance, E, NR by AS at 12/2/2022 1051    Eager, E, VU,DU,NR by  at 12/1/2022 0946    Comment: Educated pt. safety/technique with bed mobility, transfers, ambulation, WB  status, use of knee immobilizer, PT POC                   Point: Home exercise program (Done)     Learning Progress Summary           Patient Eager, E,H, VU,DU,NR by  at 12/9/2022 1620    Comment: Reviewed safety/technique with transfers, ambulation, HEP, PT POC    Eager, E, VU by SC at 12/8/2022 1418    Comment: reviewed benefits of activity    Eager, E, VU,NR by SC at 12/7/2022 1156    Comment: reviewed ROM exercise    Eager, E, VU,DU,NR by  at 12/6/2022 1637    Comment: Reviewed safety/technique with transfers, ambulation, HEP, PT POC    Eager, E, VU,DU,NR by  at 12/5/2022 1428    Comment: Reviewed safety/technique with bed mobility, transfers, ambulation, HEP, PT POC    Acceptance, E,TB, VU by  at 12/4/2022 1003    Acceptance, E,TB, VU by  at 12/3/2022 1137    Acceptance, E, NR by AS at 12/2/2022 1051    Eager, E, VU,DU,NR by  at 12/1/2022 0946    Comment: Educated pt. safety/technique with bed mobility, transfers, ambulation, WB status, use of knee immobilizer, PT POC                   Point: Body mechanics (Done)     Learning Progress Summary           Patient Eager, E,H, VU,DU,NR by  at 12/9/2022 1620    Comment: Reviewed safety/technique with transfers, ambulation, HEP, PT POC    Eager, E, VU by SC at 12/8/2022 1418    Comment: reviewed benefits of activity    Eager, E, VU,NR by SC at 12/7/2022 1156    Comment: reviewed ROM exercise    Eager, E, VU,DU,NR by  at 12/6/2022 1637    Comment: Reviewed safety/technique with transfers, ambulation, HEP, PT POC    Eager, E, VU,DU,NR by  at 12/5/2022 1428    Comment: Reviewed safety/technique with bed mobility, transfers, ambulation, HEP, PT POC    Acceptance, E,TB, VU by  at 12/4/2022 1003    Acceptance, E,TB, VU by  at 12/3/2022 1137    Acceptance, E, NR by AS at 12/2/2022 1051    YANELY Junior, FLORECITA,RAH,NR by SS at 12/1/2022 0946    Comment: Educated pt. safety/technique with bed mobility, transfers, ambulation, WB status, use of knee immobilizer, PT  POC                   Point: Precautions (Done)     Learning Progress Summary           Patient Eager, E,H, VU,DU,NR by  at 12/9/2022 1620    Comment: Reviewed safety/technique with transfers, ambulation, HEP, PT POC    Eager, E, VU by SC at 12/8/2022 1418    Comment: reviewed benefits of activity    Eager, E, VU,NR by SC at 12/7/2022 1156    Comment: reviewed ROM exercise    Eager, E, VU,DU,NR by  at 12/6/2022 1637    Comment: Reviewed safety/technique with transfers, ambulation, HEP, PT POC    Eager, E, VU,DU,NR by  at 12/5/2022 1428    Comment: Reviewed safety/technique with bed mobility, transfers, ambulation, HEP, PT POC    Acceptance, E,TB, VU by  at 12/4/2022 1003    Acceptance, E,TB, VU by  at 12/3/2022 1137    Acceptance, E, NR by AS at 12/2/2022 1051    Eager, E, VU,DU,NR by  at 12/1/2022 0946    Comment: Educated pt. safety/technique with bed mobility, transfers, ambulation, WB status, use of knee immobilizer, PT POC                               User Key     Initials Effective Dates Name Provider Type Discipline    SC 06/16/21 -  Orion Reyes, PT Physical Therapist PT    AS 06/16/21 -  Ana Cristina Gutierrez, PTA Physical Therapist Assistant PT     09/22/20 -  Ehsan De La Cruz, PT Physical Therapist PT     06/01/21 -  Susan Quesada PT Physical Therapist PT              PT Recommendation and Plan  Planned Therapy Interventions (PT): balance training, bed mobility training, gait training, home exercise program, neuromuscular re-education, patient/family education, postural re-education, strengthening, stretching, transfer training  Plan of Care Reviewed With: patient  Progress: improving  Outcome Evaluation: Pt. performed sit to stand transfer w/stand by assist. He ambulated 120' w/front wheeled walker, contact guard assist. Gait limited by increased pain. He tolerated ther-ex well. Will continue to progress as able.     Time Calculation:    PT Charges     Row Name 12/09/22 4051              Time Calculation    Start Time 1447  -SS      Stop Time 1501  -SS      Time Calculation (min) 14 min  -SS      PT Received On 12/09/22  -         Time Calculation- PT    Total Timed Code Minutes- PT 14 minute(s)  -SS         Timed Charges    18323 - PT Therapeutic Exercise Minutes 6  -SS      76372 - Gait Training Minutes  8  -SS         Total Minutes    Timed Charges Total Minutes 14  -SS       Total Minutes 14  -SS            User Key  (r) = Recorded By, (t) = Taken By, (c) = Cosigned By    Initials Name Provider Type     Susan Quesada, PT Physical Therapist              Therapy Charges for Today     Code Description Service Date Service Provider Modifiers Qty    [id number] HC GAIT TRAINING EA 15 MIN 12/9/2022 Susan Quesada PT GP 1          PT G-Codes  Outcome Measure Options: AM-PAC 6 Clicks Basic Mobility (PT)  AM-PAC 6 Clicks Score (PT): 19  AM-PAC 6 Clicks Score (OT): 16  PT Discharge Summary  Anticipated Discharge Disposition (PT): inpatient rehabilitation facility    Susan Quesada PT  12/9/2022

## 2022-12-09 NOTE — PROGRESS NOTES
[facility] Progress Note        Antibiotics:  Anti-Infectives (From admission, onward)    Ordered     Dose/Rate Route Frequency Start Stop    12/06/22 2158  vancomycin 1250 mg/250 mL 0.9% NS IVPB ([facility])        Ordering Provider: Bryce Euceda MD    1,250 mg  over 90 Minutes Intravenous Every 12 Hours 12/06/22 2300 01/08/23 1059    12/02/22 1159  ceftaroline (TEFLARO) 600 mg/100 mL 0.9% NS (mbp)        Ordering Provider: Bryce Euceda MD    600 mg Intravenous Every 12 Hours Scheduled 12/02/22 1500 01/08/23 2059    12/01/22 0933  vancomycin 1750 mg/500 mL 0.9% NS IVPB ([facility])        Ordering Provider: Bryce Euceda MD    20 mg/kg × 88.9 kg  over 120 Minutes Intravenous Once 12/01/22 1030 12/01/22 1310    12/01/22 0919  Pharmacy to dose vancomycin        Ordering Provider: Bryce Euceda MD     Does not apply Continuous PRN 12/01/22 0919 12/29/22 0918    11/30/22 1449  ceFAZolin in dextrose (ANCEF) IVPB solution 2 g        Ordering Provider: Brain Bentley MD    2 g  over 30 Minutes Intravenous Every 8 Hours 11/30/22 1800 12/01/22 0151    11/30/22 0815  ceFAZolin in dextrose (ANCEF) IVPB solution 2 g        Ordering Provider: Brain Bentley MD    2 g  over 30 Minutes Intravenous Once 11/30/22 0817 11/30/22 1045    11/30/22 0815  vancomycin 1250 mg/250 mL 0.9% NS IVPB ([facility])        Ordering Provider: Brain Bentley MD    15 mg/kg × 88.9 kg Intravenous Once 11/30/22 0817 11/30/22 0902          CC: fatigue    HPI:    Patient is a 57 y.o.  Yr old male with history of cirrhosis/diabetes and other comorbidity as outlined below.  History of left foot gas gangrene with osteomyelitis and MRSA bacteremia treated in [place] by Dr. Giordano in May/June 2022.  Family reports left transmetatarsal amputation in approximately 8 weeks IV vancomycin.  Notes from [place] indicate transthoracic echocardiogram no vegetation per Dr. Giordano; he thinks he might of had several weeks of oral antibiotic after that but not entirely  clear.  He is admitted to [facility] September 20 with progressive right knee pain occurring over the past week preceding admission.  He thinks he might of twisted it but ended up with progressive redness/swelling and pain.  No specific blunt force or penetrating trauma.  he was evaluated by orthopedics and taken to the operating room for right knee incision/drainage and concern for septic arthritis per Dr. Bentley; blood cultures had  MRSA.  Had dysuria but urinalysis not consistent with UTI.  No hematuria or pyuria     9/23 with TV echodensity; 9/26/22  TYRESE no vegetation per cardiology; daptomycin maintained, transferred to [place] with care taken over by Dr. Giordano; repeat blood cultures there on October 18 and October 20 and October 22 (dapto STU = 1)  with MRSA; blood cultures on October 24 and November 12 were negative per microbiology.  Repeat blood cultures November 20 with MRSA and daptomycin STU equal to 3, not susceptible per my discussion with microbiology; blood cultures positive again on November 22 but negative November 23. per Dr Giordano, teflaro had been added and concern regarding right knee with increased swelling/pain prompted transitioned back to [place] for further right knee surgery on November 30 by Dr Bentley     **I d/w Dr Giordano; he felt there was an obvious right arm PICC line infection in October with changes at the exit site and concern it was the reason for positive blood cultures at that time.  No catheter tip culture available per micro.  In November, recurrent bacteremia associated with right knee swelling/pain but no other focal symptoms per my discussion with Dr. Giordano; at risk for sequestered/metastatic focus    12/5/22 d/w Dr Giordano and  plans to get back to [facility];  Dr Giordano will facilitate additional radiographic workup if needed depending on clinical course    12/8/22 discussed with Dr. Giordano and Dr SHEEHAN again     12/9/22 d/w Dr SHEEHAN;  [facility]  [place] him; he had been given teflaro there previously per Dr Giordano; doing okay overall. he denies any new focal pain or symptomatology.postop right knee with controlled pain,  dull at present,  Better controlled per nursing overall, nonradiating, worse with movement, better with pain meds and 2 out of 10 in severity at present; he denies any other focal musculoskeletal pain.  No new back pain but he does relate chronic lumbar pain, dull at present, worse with movement, not progressive and no new weakness or numbness. No myalgia     Left foot with no redness/swelling or pain.  Surgical site healed with no open wound or active drainage. Has a left arm midline     No headache photophobia or neck stiffness.  No nausea vomiting diarrhea or abdominal pain.  No shortness of breath cough or hemoptysis.  No other new skin rash.  No other recent procedures or interventions.    No indwelling pacemaker        ROS:      12/9/22 No f/c/s. No n/v/d. No rash. No new ADR to Abx.     Constitutional-- No Fever, chills or sweats.  Appetite diminished with fatigue.  Heent-- No new vision, hearing or throat complaints.  No epistaxis or oral sores.  Denies odynophagia or dysphagia.  No flashers, floaters or eye pain.   No headache, photophobia or neck stiffness.  CV-- No chest pain, palpitation or syncope  Resp-- No SOB/cough/Hemoptysis  GI- No nausea, vomiting, or diarrhea.  No hematochezia, melena, or hematemesis. Denies jaundice  -- No dysuria, hematuria, or flank pain.  Denies hesitancy, urgency or flank pain.  Lymph- no swollen lymph nodes in neck/axilla or groin.   Heme- No active bruising or bleeding; no Hx of DVT or PE.  MS-- no swelling or pain in the bones or joints of arms/legs.  No new back pain.  Neuro-- No acute focal weakness or numbness in the arms or legs.  No seizures.     Full 12 point review of systems reviewed and negative otherwise for acute complaints, except for above      PE: nursing/out from present  BP  "119/64 (BP Location: Left arm, Patient Position: Lying)   Pulse 71   Temp 97.8 °F (36.6 °C) (Oral)   Resp 16   Ht 177.8 cm (70\")   Wt 88.9 kg (196 lb)   SpO2 97%   BMI 28.12 kg/m²     GENERAL: Awake and alert, in no acute distress.  Chronically ill-appearing  HEENT: Normocephalic, atraumatic.    No conjunctival injection. No icterus. Oropharynx clear without evidence of thrush or exudate. No evidence of peridontal disease.    NECK: Supple without nuchal rigidity. No mass.   HEART: RRR; soft murmur LSB, rubs, gallops.   LUNGS: Diminished at bases but otherwise clear to auscultation bilaterally without wheezing, rales, rhonchi. Normal respiratory effort. Nonlabored. No dullness.  ABDOMEN: Soft, nontender, nondistended. Positive bowel sounds. No rebound or guarding. NO mass or HSM.  EXT:  No cyanosis, clubbing or edema. No cord.   MSK: FROM without joint effusions noted arms/legs.    SKIN: Warm and dry without cutaneous eruptions on Inspection/palpation.    NEURO: Oriented to PPT. No focal deficits on motor/sensory exam at arms/legs.     Left foot with no redness induration or warmth.  No discrete mass bulge or fluctuance.  No crepitus or bulla.  Prior surgical site healed at [facility].  No open wound or active drainage     Right knee postop surgical site no new redness or purulence.    No discrete mass bulge or fluctuance.  No crepitus or bulla. Better range of motion     No peripheral stigmata/phenomena of endocarditis     IV without obvious redness or drainage    Laboratory Data    Results from last 7 days   Lab Units 12/06/22  0534   HEMOGLOBIN g/dL 7.3*   HEMATOCRIT % 23.3*     Results from last 7 days   Lab Units 12/07/22  0432   SODIUM mmol/L 139   POTASSIUM mmol/L 4.3   CHLORIDE mmol/L 105   CO2 mmol/L 27.0   BUN mg/dL 13   CREATININE mg/dL 0.70*   GLUCOSE mg/dL 151*   CALCIUM mg/dL 7.7*                   Estimated Creatinine Clearance: 130.8 mL/min (A) (by C-G formula based on SCr of 0.7 mg/dL " "(L)).      Microbiology:      Radiology:  Imaging Results (Last 72 Hours)     ** No results found for the last 72 hours. **            Impression:       --Acute/recurrent/persistent MRSA bacteremia/septicemia; prior history positive cultures May 2022 and recurrent despite prior  IV vancomycin and left foot surgery in addition to other supportive measures.   TTE with ? TV echodensity prior admit in September and TYRESE no vegetation at that time at [facility];   blood culture positivity again at [facility] in October/November as above.  Daptomycin STU increased to 3 on 11/20 isolate and not sensitive to daptomycin per microbiology.  Discussed on several occasions with Dr. Giordano; had a repeat TYRESE on October 31 with mention of \" moderate size echodense structure above the posterior tricuspid leaflet but not attached to the leaflet\" per cardiology there, described potentially as a \"normal variant\" per their note.  High risk for further serious morbidity and other serious sequela including persistent/progressive or recurrent infection, metastatic foci of involvement and other dire consequences;  vancomycin sensitivity maintained albeit with STU =2; adjusted to vancomycin/ceftaroline in light of daptomycin resistance and I asked microbiology     **Blood cultures November 23 negative so far    **repeat TYRESE 12/1 ; no definitive vegetation per cardiology and only mild tricuspid valve regurgitation stable from prior exam per them.  Ill-defined area in the right atrium appeared stable to them. D/w Dr Trent; you still may need to consider PET scan and likely to require repeat echocardiography during his course.  I have discussed with Dr. Giordano and he will arrange     --Acute right knee pain/septic arthritis with surgical intervention, incision/debridement by Dr. Bentley on September 21.   MRSA+ in the setting of MRSA bacteremia at that time;  Repeat surgery 11/30, d/w Dr Bentley. Cultures November 30 negative     --History of " cirrhosis by records.  Unclear etiology.   further GI referral/eval per  medicine team     --Diabetes.  You need to tightly control blood sugar to give best chance for healing.;  Described as uncontrolled by medicine team at admission     -- Chronic lumbar back pain for decades.  This is not new, not worse and no new location.  No new exacerbation or new neurologic symptomatology in his extremities.  MRI 12/3 suboptimal per radiology;  may require further imaging depending on his clinical course to help exclude any other sequestered/spinal-paraspinal focus depending on clinical course     --TCP ; monitor     PLAN:       -- IV vancomycin/ceftaroline potentially 6 weeks but to depend on clinical course/study results and response to therapy.  High risk for occult endovascular focus with multiple past positive blood cultures, most recently November 20 and 22; after IV antibiotics, depending on clinical course, may require chronic oral suppression step down (likely to be decided by Dr. Giordano )     -- Check/review labs cultures and scans     -- Partial history per nursing staff     --d/w pharmacy       -- Highly complex set of issues with high risk for further serious morbidity and other serious sequela     -- Discussed with microbiology; they are doing further assessment to blood cultures drawn November 22     --d/w Dr Giordano and Dr SHEEHAN;  Dr Giordano to help with disposition       --  new PICC line,   No specific new pain or other suppurative changes at the surface at either site.  I discussed with Dr. Trent regarding additional diagnostics for endocarditis such as PET scan; this still may need to be considered as outpatient depending on clinical course.  see above.     **MRI's noted without specific focus at Lumbar spine or foot although spinal imaging suboptimal per radiology; I d/w Dr Giordano with plans for transition back to [facility]ACH there; depending on clinical course/symptomatology, Dr. Giordano will consider white blood  cell scan versus repeat imaging to evaluate for potential occult focus.    ** 12/9 d/w Dr SHEEHAN;  He came from [facility] in [place] per patient; he had been given teflaro there previously per Dr Giordano    **Copied text in this note has been reviewed and is accurate as of 12/09/22.        Bryce Euceda MD  12/9/2022

## 2022-12-09 NOTE — PLAN OF CARE
Goal Outcome Evaluation:  Plan of Care Reviewed With: patient        Progress: improving  Outcome Evaluation: patient alert and oriented, VSS, good urine output. Patient up with therapy today, plan for long term abx, continue to monitor

## 2022-12-09 NOTE — CASE MANAGEMENT/SOCIAL WORK
"Continued Stay Note  [facility]     Patient Name: Melchor Hardwick III  MRN: [id number]  Today's Date: 12/9/2022    Admit Date: 11/30/2022    Plan: LTACH   Discharge Plan     Row Name 12/09/22 1131       Plan    Plan LTACH    Patient/Family in Agreement with Plan yes    Plan Comments I spoke with Mr. Hardwick at the bedside and updated him on [facility] denial. He is in agreement to \"go where ever you can get me a bed\". I have sent referrals to [facility], [facility], and [facility] in [place]. Will await to hear back from these facilities about a bed offer or denial. Case management will continue to follow.    Final Discharge Disposition Code 30 - still a patient               Discharge Codes    No documentation.               Expected Discharge Date and Time     Expected Discharge Date Expected Discharge Time    Dec 10, 2022             Gavin Collazo RN    "

## 2022-12-09 NOTE — PROGRESS NOTES
"IM progress note      Melchor Hardwick III  [id number]  1965     LOS: 9 days     Attending: Brain Bentley MD    Primary Care Provider: Missy Up APRN      Chief Complaint/Reason for visit:  No chief complaint on file.      Subjective   Doing well. No f/c/n/voms/sob.   Good pain control.   Denied by LTAC.    Objective        Visit Vitals  /66 (BP Location: Left arm, Patient Position: Lying)   Pulse 76   Temp 98.1 °F (36.7 °C) (Oral)   Resp 16   Ht 177.8 cm (70\")   Wt 88.9 kg (196 lb)   SpO2 97%   BMI 28.12 kg/m²     Temp (24hrs), Av.1 °F (36.7 °C), Min:97.8 °F (36.6 °C), Max:98.2 °F (36.8 °C)      Intake/Output:    Intake/Output Summary (Last 24 hours) at 2022 1633  Last data filed at 2022 1328  Gross per 24 hour   Intake 360 ml   Output 3100 ml   Net -2740 ml         Physical Exam:     General Appearance:    Alert, cooperative, in no acute distress   Head:    Normocephalic, without obvious abnormality, atraumatic    Lungs:     Normal effort, symmetric chest rise,  clear to      auscultation bilaterally              Heart:    Regular rhythm and normal rate, normal S1 and S2    Abdomen:     Normal bowel sounds, no masses, no organomegaly, soft        non-tender, non-distended, no guarding, no rebound                tenderness.  Abdominal binder in place   Extremities: CDI optifoam over knee incision.      Pulses:   Pulses palpable and equal bilaterally   Skin:   No bleeding, bruising or rash          Results Review:     I reviewed the patient's new clinical results.   Results from last 7 days   Lab Units 22  0534   HEMOGLOBIN g/dL 7.3*   HEMATOCRIT % 23.3*     Results from last 7 days   Lab Units 22  0432 22  0534 22  0441   SODIUM mmol/L 139 136 137   POTASSIUM mmol/L 4.3 4.1 4.2   CHLORIDE mmol/L 105 103 104   CO2 mmol/L 27.0 28.0 28.0   BUN mg/dL 13 13 11   CREATININE mg/dL 0.70* 0.72* 0.56*   CALCIUM mg/dL 7.7* 8.0* 8.0*   GLUCOSE mg/dL 151* 132* 198*   TYRESE   " •  Indication for TYRESE -to rule out infective endocarditis in a patient with MRSA bacteremia.  •  Findings-  •  Left ventricular systolic function is normal. Estimated left ventricular EF = 60%  •  Left atrial appendage is well visualized and is free of thrombus.  •  Saline test was negative for the evidence of shunt in interatrial septum.  •  The tricuspid valve appeared normal in structure. There was a moderate sized echodense structure seen above  the posterior tricuspid leaflet but not attached to the leaflet. The appearance and location are not typical for regurgitation. This structure could be a normal variant. No evidence of tricuspid valve stenosis or significant regurgitations.  •  Other valves also appear normal in structure with no evidence of stenosis or significant regurgitations.  No vegetations seen on these valves.  •  There is no evidence of pericardial effusion.  •  Comment-  •  In view of presence of MRSA bacteremia, recommend to extend antibiotic therapy for possible infective endocarditis and repeat TYRESE in 3 months.            Latest Reference Range & Units 12/08/22 07:29 12/08/22 11:33 12/08/22 16:30 12/09/22 11:44   Glucose 70 - 130 mg/dL 78 117 145 (H) 103   (H): Data is abnormally high    All medications reviewed.   acetaminophen, 1,000 mg, Oral, Q8H  aspirin, 81 mg, Oral, Q12H  ceftaroline, 600 mg, Intravenous, Q12H  insulin detemir, 20 Units, Subcutaneous, Q12H  insulin lispro, 0-7 Units, Subcutaneous, TID AC  Insulin Lispro, 7 Units, Subcutaneous, TID With Meals  levothyroxine, 25 mcg, Oral, Q AM  meloxicam, 15 mg, Oral, Daily  midodrine, 2.5 mg, Oral, TID AC  pantoprazole, 40 mg, Oral, Q AM  polyethylene glycol, 17 g, Oral, Daily  pregabalin, 150 mg, Oral, Q12H  sodium chloride, 10 mL, Intravenous, Q12H  vancomycin, 1,250 mg, Intravenous, Q12H      cyclobenzaprine, 5 mg, TID PRN  dextrose, 25 g, Q15 Min PRN  dextrose, 15 g, Q15 Min PRN  diphenhydrAMINE, 25 mg, Q6H PRN    Or  diphenhydrAMINE, 25 mg, Q6H PRN  glucagon (human recombinant), 1 mg, Q15 Min PRN  HYDROmorphone, 0.5 mg, Q2H PRN   And  naloxone, 0.1 mg, Q5 Min PRN  labetalol, 10 mg, Q4H PRN  ondansetron, 4 mg, Q6H PRN   Or  ondansetron, 4 mg, Q6H PRN  oxyCODONE, 10 mg, Q4H PRN  oxyCODONE, 5 mg, Q4H PRN  Pharmacy to dose vancomycin, , Continuous PRN  sodium chloride, 500 mL, TID PRN  sodium chloride, 10 mL, PRN        Assessment & Plan       S/P right knee implant removal with insertion of antibiotic spacer    Type 2 diabetes mellitus with hyperglycemia, with long-term current use of insulin (HCC)    Infection of right knee (HCC)    Hypothyroid  MRSA sepsis  Chronic low back pain  S/p PICC placement.      Plan  1. PT/OT, weightbearing per protocol.  2. Pain control-prns   3. IS-encouraged  4. DVT proph-mechanicals, aspirin.  5. Bowel regimen  6. Monitor post-op labs  7. DC planning,ongoing.  following     Hypothyroid  -Continue home Synthroid     DM  - hgb A1c on 11/28/2022 7.3  -Continue home bolus insulin with meals and long-acting insulin twice daily/ doses increased 12/1.   - Accu-Chek AC and HS with low dose SSI    TYRESE, done, noted, no obvious vegetation.    No infectious focus evident on MRIs.    Antibiotics per ID, Dr. Euceda is following   Plan on tagged WBC scan here or at [facility].         Rose Diaz MD  12/09/22  16:33 EST

## 2022-12-09 NOTE — PLAN OF CARE
Goal Outcome Evaluation:  Plan of Care Reviewed With: patient        Progress: improving  Outcome Evaluation: Pt. performed sit to stand transfer w/stand by assist. He ambulated 120' w/front wheeled walker, contact guard assist. Gait limited by increased pain. He tolerated ther-ex well. Will continue to progress as able.

## 2022-12-10 PROBLEM — G89.29 CHRONIC LOW BACK PAIN: Status: ACTIVE | Noted: 2022-12-10

## 2022-12-10 PROBLEM — Z45.2 PICC (PERIPHERALLY INSERTED CENTRAL CATHETER) IN PLACE: Status: ACTIVE | Noted: 2022-12-10

## 2022-12-10 PROBLEM — M54.50 CHRONIC LOW BACK PAIN: Status: ACTIVE | Noted: 2022-12-10

## 2022-12-10 PROBLEM — A49.02 MRSA (METHICILLIN RESISTANT STAPHYLOCOCCUS AUREUS): Status: ACTIVE | Noted: 2022-12-10

## 2022-12-10 LAB
ANION GAP SERPL CALCULATED.3IONS-SCNC: 9 MMOL/L (ref 5–15)
BACTERIA SPEC ANAEROBE CULT: NORMAL
BUN SERPL-MCNC: 16 MG/DL (ref 6–20)
BUN/CREAT SERPL: 20.8 (ref 7–25)
CALCIUM SPEC-SCNC: 7.9 MG/DL (ref 8.6–10.5)
CHLORIDE SERPL-SCNC: 107 MMOL/L (ref 98–107)
CO2 SERPL-SCNC: 25 MMOL/L (ref 22–29)
CREAT SERPL-MCNC: 0.77 MG/DL (ref 0.76–1.27)
EGFRCR SERPLBLD CKD-EPI 2021: 104.4 ML/MIN/1.73
GLUCOSE BLDC GLUCOMTR-MCNC: 109 MG/DL (ref 70–130)
GLUCOSE BLDC GLUCOMTR-MCNC: 116 MG/DL (ref 70–130)
GLUCOSE BLDC GLUCOMTR-MCNC: 132 MG/DL (ref 70–130)
GLUCOSE BLDC GLUCOMTR-MCNC: 136 MG/DL (ref 70–130)
GLUCOSE SERPL-MCNC: 94 MG/DL (ref 65–99)
POTASSIUM SERPL-SCNC: 4.7 MMOL/L (ref 3.5–5.2)
SODIUM SERPL-SCNC: 141 MMOL/L (ref 136–145)

## 2022-12-10 PROCEDURE — 82962 GLUCOSE BLOOD TEST: CPT

## 2022-12-10 PROCEDURE — 63710000001 INSULIN DETEMIR PER 5 UNITS: Performed by: INTERNAL MEDICINE

## 2022-12-10 PROCEDURE — 25010000002 CEFTAROLINE FOSAMIL PER 10 MG: Performed by: INTERNAL MEDICINE

## 2022-12-10 PROCEDURE — 97116 GAIT TRAINING THERAPY: CPT

## 2022-12-10 PROCEDURE — 80048 BASIC METABOLIC PNL TOTAL CA: CPT | Performed by: INTERNAL MEDICINE

## 2022-12-10 PROCEDURE — 63710000001 INSULIN LISPRO (HUMAN) PER 5 UNITS: Performed by: INTERNAL MEDICINE

## 2022-12-10 PROCEDURE — 97110 THERAPEUTIC EXERCISES: CPT

## 2022-12-10 PROCEDURE — 25010000002 VANCOMYCIN 10 G RECONSTITUTED SOLUTION

## 2022-12-10 RX ADMIN — LEVOTHYROXINE SODIUM 25 MCG: 25 TABLET ORAL at 06:26

## 2022-12-10 RX ADMIN — INSULIN LISPRO 7 UNITS: 100 INJECTION, SOLUTION INTRAVENOUS; SUBCUTANEOUS at 17:54

## 2022-12-10 RX ADMIN — PANTOPRAZOLE SODIUM 40 MG: 40 TABLET, DELAYED RELEASE ORAL at 06:26

## 2022-12-10 RX ADMIN — INSULIN DETEMIR 20 UNITS: 100 INJECTION, SOLUTION SUBCUTANEOUS at 22:11

## 2022-12-10 RX ADMIN — INSULIN LISPRO 7 UNITS: 100 INJECTION, SOLUTION INTRAVENOUS; SUBCUTANEOUS at 11:36

## 2022-12-10 RX ADMIN — VANCOMYCIN HYDROCHLORIDE 1250 MG: 10 INJECTION, POWDER, LYOPHILIZED, FOR SOLUTION INTRAVENOUS at 00:37

## 2022-12-10 RX ADMIN — ACETAMINOPHEN 1000 MG: 500 TABLET, FILM COATED ORAL at 22:11

## 2022-12-10 RX ADMIN — INSULIN LISPRO 7 UNITS: 100 INJECTION, SOLUTION INTRAVENOUS; SUBCUTANEOUS at 09:20

## 2022-12-10 RX ADMIN — CEFTAROLINE FOSAMIL 600 MG: 600 POWDER, FOR SOLUTION INTRAVENOUS at 22:11

## 2022-12-10 RX ADMIN — POLYETHYLENE GLYCOL 3350 17 G: 17 POWDER, FOR SOLUTION ORAL at 09:21

## 2022-12-10 RX ADMIN — INSULIN DETEMIR 20 UNITS: 100 INJECTION, SOLUTION SUBCUTANEOUS at 09:20

## 2022-12-10 RX ADMIN — MIDODRINE HYDROCHLORIDE 2.5 MG: 5 TABLET ORAL at 11:36

## 2022-12-10 RX ADMIN — VANCOMYCIN HYDROCHLORIDE 1250 MG: 10 INJECTION, POWDER, LYOPHILIZED, FOR SOLUTION INTRAVENOUS at 11:57

## 2022-12-10 RX ADMIN — PREGABALIN 150 MG: 150 CAPSULE ORAL at 09:21

## 2022-12-10 RX ADMIN — Medication 10 ML: at 09:25

## 2022-12-10 RX ADMIN — MIDODRINE HYDROCHLORIDE 2.5 MG: 5 TABLET ORAL at 06:28

## 2022-12-10 RX ADMIN — ACETAMINOPHEN 1000 MG: 500 TABLET, FILM COATED ORAL at 06:26

## 2022-12-10 RX ADMIN — CEFTAROLINE FOSAMIL 600 MG: 600 POWDER, FOR SOLUTION INTRAVENOUS at 09:20

## 2022-12-10 RX ADMIN — Medication 10 ML: at 22:11

## 2022-12-10 RX ADMIN — PREGABALIN 150 MG: 150 CAPSULE ORAL at 22:10

## 2022-12-10 RX ADMIN — MELOXICAM 15 MG: 15 TABLET ORAL at 09:21

## 2022-12-10 RX ADMIN — ASPIRIN 81 MG: 81 TABLET, COATED ORAL at 09:21

## 2022-12-10 RX ADMIN — ACETAMINOPHEN 1000 MG: 500 TABLET, FILM COATED ORAL at 13:28

## 2022-12-10 RX ADMIN — ASPIRIN 81 MG: 81 TABLET, COATED ORAL at 22:11

## 2022-12-10 RX ADMIN — MIDODRINE HYDROCHLORIDE 2.5 MG: 5 TABLET ORAL at 17:54

## 2022-12-10 NOTE — PLAN OF CARE
Goal Outcome Evaluation:  Plan of Care Reviewed With: patient        Progress: no change  Outcome Evaluation: Up with PT today and tolerates well. Glucoses 136, 132 and 109.  HAYDEN PICC in use for antibiotics. Vanc level due for 0600 in the morning. Labs due for the morning. Optifoam dressing is CDI. Waiting a discharge plan when ready. Will monitor.

## 2022-12-10 NOTE — THERAPY TREATMENT NOTE
Patient Name: Melchor Hardwick III  : 1965    MRN: [id number]                              Today's Date: 12/10/2022       Admit Date: 2022    Visit Dx:     ICD-10-CM ICD-9-CM   1. Infection of right knee (HCC)  M00.9 686.9     Patient Active Problem List   Diagnosis   • Hyperlipidemia   • Hypertensive disorder   • Obesity   • Type 2 diabetes mellitus with hyperglycemia, with long-term current use of insulin (HCC)   • Gas gangrene of foot (HCC)   • Cellulitis in diabetic foot (HCC)   • Other acute osteomyelitis of left foot (HCC)   • Osteomyelitis (HCC)   • Critical illness myopathy   • Staphylococcal arthritis of right knee (HCC)   • Other cirrhosis of liver (HCC)   • MRSA bacteremia   • Endocarditis of tricuspid valve   • Wound of right buttock   • History of osteomyelitis   • Debility   • Infection of right knee (HCC)   • S/P right knee implant removal with insertion of antibiotic spacer   • Hypothyroid     Past Medical History:   Diagnosis Date   • Diabetes mellitus (HCC)     4 times per day gets checked for sugar at rehab   • Dizzy    • Hyperlipidemia    • Hypertension    • Hypothyroid 2022   • MRSA infection     blood -      • Orthostatic hypotension    • Pressure sore on buttocks     top crack -  sees wound - but now rn take care of it at rehab - minor opening - dime size - pat nurse didnt assess-  pt states getting better   • Pyogenic arthritis of right knee joint, due to unspecified organism (HCC) 2022   • Sepsis (HCC)    • Wears glasses     readers     Past Surgical History:   Procedure Laterality Date   • ABSCESS DRAINAGE  2004    PERINEUM   • AMPUTATION FOOT Left 2022    Procedure: AMPUTATION FOOT;  Surgeon: Addison Colby MD;  Location: [facility];  Service: Podiatry;  Laterality: Left;   • INCISION AND DRAINAGE LEG Left 05/10/2022    Procedure: INCISION AND DRAINAGE LOWER EXTREMITY;  Surgeon: Addison Colby MD;  Location: [facility] OR;  Service: Podiatry;  Laterality:  Left;   • KNEE INCISION AND DRAINAGE Right 09/21/2022    Procedure: KNEE INCISION AND DRAINAGE RIGHT;  Surgeon: Brain Bentley MD;  Location:  [facility] OR;  Service: Orthopedics;  Laterality: Right;   • PERIPHERALLY INSERTED CENTRAL CATHETER INSERTION Left    • TOTAL KNEE  PROSTHESIS REMOVAL W/ SPACER INSERTION Right 11/30/2022    Procedure: ANTIBIOTIC SPACER PLACEMENT KNEE - RIGHT;  Surgeon: Brain Bentley MD;  Location:  [facility] OR;  Service: Orthopedics;  Laterality: Right;   • WOUND DEBRIDEMENT Left 05/13/2022    Procedure: DEBRIDEMENT FOOT;  Surgeon: Addison Colby MD;  Location:  [facility] OR;  Service: Podiatry;  Laterality: Left;      General Information     Row Name 12/10/22 0934          Physical Therapy Time and Intention    Document Type therapy note (daily note)  -AS     Mode of Treatment physical therapy  -AS     Row Name 12/10/22 0934          General Information    Patient Profile Reviewed yes  -AS     Existing Precautions/Restrictions fall;other (see comments)  abdominal binder per orthostatic hypotension, L transmetatarsal amp w/ walking boot  -AS     Barriers to Rehab medically complex;previous functional deficit  -AS     Row Name 12/10/22 0934          Cognition    Orientation Status (Cognition) oriented x 4  -AS     Row Name 12/10/22 0934          Safety Issues, Functional Mobility    Safety Issues Affecting Function (Mobility) positioning of assistive device;safety precaution awareness;safety precautions follow-through/compliance  -AS     Impairments Affecting Function (Mobility) endurance/activity tolerance;range of motion (ROM);postural/trunk control;strength;balance;pain  -AS     Comment, Safety Issues/Impairments (Mobility) alert and following commands, L LE in ER making it difficult to stay inside walker and avoid hitting back leg of walker  -AS           User Key  (r) = Recorded By, (t) = Taken By, (c) = Cosigned By    Initials Name Provider Type    AS Ana Cristina Gutierrez PTA Physical  Therapist Assistant               Mobility     Row Name 12/10/22 0938          Bed Mobility    Supine-Sit [place] (Bed Mobility) modified independence  -AS     Sit-Supine [place] (Bed Mobility) modified independence  -AS     Assistive Device (Bed Mobility) head of bed elevated;bed rails  -AS     Comment, (Bed Mobility) lizzy demonstrated safe technique wiht no assist needed  -AS     Row Name 12/10/22 0938          Transfers    Comment, (Transfers) cues for hand placement  -AS     Row Name 12/10/22 0938          Bed-Chair Transfer    Bed-Chair [place] (Transfers) verbal cues;contact guard;1 person assist  -AS     Assistive Device (Bed-Chair Transfers) walker, front-wheeled  -AS     Row Name 12/10/22 0938          Sit-Stand Transfer    Sit-Stand [place] (Transfers) standby assist  -AS     Assistive Device (Sit-Stand Transfers) walker, front-wheeled  -AS     Row Name 12/10/22 0938          Gait/Stairs (Locomotion)    [place] Level (Gait) verbal cues;contact guard;1 person assist  -AS     Assistive Device (Gait) walker, front-wheeled  -AS     Distance in Feet (Gait) 110  -AS     Deviations/Abnormal Patterns (Gait) stride length decreased;weight shifting decreased;yao decreased;gait speed decreased;bilateral deviations  -AS     Bilateral Gait Deviations forward flexed posture;heel strike decreased  -AS     Comment, (Gait/Stairs) patient ambulated 110 feet with CGA x1 and rolling walker for support, verbal cues to stay close to walker however unable to correct L LE ER which increases forward flexed posture. NO LOB or right knee buckling noticed. Gait distance continue to be limited by weakness and fatigue.  -AS     Row Name 12/10/22 0938          Mobility    Extremity Weight-bearing Status left lower extremity;right lower extremity  -AS     Left Lower Extremity (Weight-bearing Status) weight-bearing as tolerated (WBAT)  -AS     Right Lower Extremity (Weight-bearing Status) weight-bearing as  tolerated (WBAT)  -AS           User Key  (r) = Recorded By, (t) = Taken By, (c) = Cosigned By    Initials Name Provider Type    AS Ana Cristina Gutierrez PTA Physical Therapist Assistant               Obj/Interventions     Row Name 12/10/22 0941          Motor Skills    Therapeutic Exercise knee;ankle  -AS     [facility] Name 12/10/22 0941          Knee (Therapeutic Exercise)    Knee Isometrics (Therapeutic Exercise) bilateral;gluteal sets;quad sets;10 repetitions;supine;5 second hold  -AS     Knee Strengthening (Therapeutic Exercise) bilateral;marching while seated;LAQ (long arc quad);sitting;10 repetitions;heel slides;supine  -AS     Row Name 12/10/22 0941          Ankle (Therapeutic Exercise)    Ankle (Therapeutic Exercise) AROM (active range of motion)  -AS     Ankle AROM (Therapeutic Exercise) bilateral;dorsiflexion;plantarflexion;sitting;10 repetitions  -AS           User Key  (r) = Recorded By, (t) = Taken By, (c) = Cosigned By    Initials Name Provider Type    AS Ana Cristina Gutierrez PTA Physical Therapist Assistant               Goals/Plan    No documentation.                Clinical Impression     Row Name 12/10/22 0942          Pain    Pretreatment Pain Rating 0/10 - no pain  -AS     Posttreatment Pain Rating 0/10 - no pain  -AS     [facility] Name 12/10/22 0942          Plan of Care Review    Plan of Care Reviewed With patient  -AS     Progress improving  -AS     Outcome Evaluation patient ambulated 110 feet with CGA x1 and rolling walker for support, verbal cues to stay close to walker however unable to correct L LE ER which increases forward flexed posture. NO LOB or right knee buckling noticed. Gait distance continue to be limited by weakness and fatigue.  -AS     Row Name 12/10/22 0942          Positioning and Restraints    Pre-Treatment Position in bed  -AS     Post Treatment Position chair  -AS     In Chair reclined;call light within reach;encouraged to call for assist;exit alarm on;waffle cushion;legs  elevated;with brace  -AS           User Key  (r) = Recorded By, (t) = Taken By, (c) = Cosigned By    Initials Name Provider Type    AS Ana Cristina Gutierrez PTA Physical Therapist Assistant               Outcome Measures     Row Name 12/10/22 0942          How much help from another person do you currently need...    Turning from your back to your side while in flat bed without using bedrails? 4  -AS     Moving from lying on back to sitting on the side of a flat bed without bedrails? 4  -AS     Moving to and from a bed to a chair (including a wheelchair)? 3  -AS     Standing up from a chair using your arms (e.g., wheelchair, bedside chair)? 3  -AS     Climbing 3-5 steps with a railing? 3  -AS     To walk in hospital room? 3  -AS     AM-PAC 6 Clicks Score (PT) 20  -AS     Highest level of mobility 6 --> Walked 10 steps or more  -AS     Row Name 12/10/22 0942          Functional Assessment    Outcome Measure Options AM-PAC 6 Clicks Basic Mobility (PT)  -AS           User Key  (r) = Recorded By, (t) = Taken By, (c) = Cosigned By    Initials Name Provider Type    AS Ana Cristina Gutierrez PTA Physical Therapist Assistant                             Physical Therapy Education     Title: PT OT SLP Therapies (In Progress)     Topic: Physical Therapy (In Progress)     Point: Mobility training (In Progress)     Learning Progress Summary           Patient Acceptance, E, NR by AS at 12/10/2022 0943    Eager, E,H, VU,DU,NR by  at 12/9/2022 1620    Comment: Reviewed safety/technique with transfers, ambulation, HEP, PT POC    Eager, E, VU by SC at 12/8/2022 1418    Comment: reviewed benefits of activity    Eager, E, VU,NR by SC at 12/7/2022 1156    Comment: reviewed ROM exercise    Eager, E, VU,DU,NR by  at 12/6/2022 1637    Comment: Reviewed safety/technique with transfers, ambulation, HEP, PT POC    Eager, E, VU,DU,NR by  at 12/5/2022 1428    Comment: Reviewed safety/technique with bed mobility, transfers, ambulation, HEP,  PT POC    Acceptance, E,TB, VU by  at 12/4/2022 1003    Acceptance, E,TB, VU by  at 12/3/2022 1137    Acceptance, E, NR by AS at 12/2/2022 1051    Eager, E, VU,DU,NR by  at 12/1/2022 0946    Comment: Educated pt. safety/technique with bed mobility, transfers, ambulation, WB status, use of knee immobilizer, PT POC                   Point: Home exercise program (In Progress)     Learning Progress Summary           Patient Acceptance, E, NR by AS at 12/10/2022 0943    Eager, E,H, VU,DU,NR by  at 12/9/2022 1620    Comment: Reviewed safety/technique with transfers, ambulation, HEP, PT POC    Eager, E, VU by SC at 12/8/2022 1418    Comment: reviewed benefits of activity    Eager, E, VU,NR by SC at 12/7/2022 1156    Comment: reviewed ROM exercise    Eager, E, VU,DU,NR by  at 12/6/2022 1637    Comment: Reviewed safety/technique with transfers, ambulation, HEP, PT POC    Eager, E, VU,DU,NR by  at 12/5/2022 1428    Comment: Reviewed safety/technique with bed mobility, transfers, ambulation, HEP, PT POC    Acceptance, E,TB, VU by  at 12/4/2022 1003    Acceptance, E,TB, VU by  at 12/3/2022 1137    Acceptance, E, NR by AS at 12/2/2022 1051    Eager, E, VU,DU,NR by  at 12/1/2022 0946    Comment: Educated pt. safety/technique with bed mobility, transfers, ambulation, WB status, use of knee immobilizer, PT POC                   Point: Body mechanics (In Progress)     Learning Progress Summary           Patient Acceptance, E, NR by AS at 12/10/2022 0943    Eager, E,H, VU,DU,NR by  at 12/9/2022 1620    Comment: Reviewed safety/technique with transfers, ambulation, HEP, PT POC    Eager, E, VU by SC at 12/8/2022 1418    Comment: reviewed benefits of activity    Eager, E, VU,NR by SC at 12/7/2022 1156    Comment: reviewed ROM exercise    YANELY Junior VU, DU,NR by SS at 12/6/2022 1637    Comment: Reviewed safety/technique with transfers, ambulation, HEP, PT POC    YANELY Junior VU, DU,NR by  at 12/5/2022 1428    Comment:  Reviewed safety/technique with bed mobility, transfers, ambulation, HEP, PT POC    Acceptance, E,TB, VU by  at 12/4/2022 1003    Acceptance, E,TB, VU by  at 12/3/2022 1137    Acceptance, E, NR by AS at 12/2/2022 1051    Eager, E, VU,DU,NR by  at 12/1/2022 0946    Comment: Educated pt. safety/technique with bed mobility, transfers, ambulation, WB status, use of knee immobilizer, PT POC                   Point: Precautions (In Progress)     Learning Progress Summary           Patient Acceptance, E, NR by AS at 12/10/2022 0943    Eager, E,H, VU,DU,NR by  at 12/9/2022 1620    Comment: Reviewed safety/technique with transfers, ambulation, HEP, PT POC    Eager, E, VU by SC at 12/8/2022 1418    Comment: reviewed benefits of activity    Eager, E, VU,NR by SC at 12/7/2022 1156    Comment: reviewed ROM exercise    Eager, E, VU,DU,NR by  at 12/6/2022 1637    Comment: Reviewed safety/technique with transfers, ambulation, HEP, PT POC    Eager, E, VU,DU,NR by  at 12/5/2022 1428    Comment: Reviewed safety/technique with bed mobility, transfers, ambulation, HEP, PT POC    Acceptance, E,TB, VU by  at 12/4/2022 1003    Acceptance, E,TB, VU by  at 12/3/2022 1137    Acceptance, E, NR by AS at 12/2/2022 1051    Eager, E, VU,DU,NR by  at 12/1/2022 0946    Comment: Educated pt. safety/technique with bed mobility, transfers, ambulation, WB status, use of knee immobilizer, PT POC                               User Key     Initials Effective Dates Name Provider Type Discipline    SC 06/16/21 -  Orion Reyes, PT Physical Therapist PT    AS 06/16/21 -  Ana Cristina Gutierrez, PTA Physical Therapist Assistant PT     09/22/20 -  Ehsan De La Cruz, PT Physical Therapist PT     06/01/21 -  Susan Quesada, PT Physical Therapist PT              PT Recommendation and Plan     Plan of Care Reviewed With: patient  Progress: improving  Outcome Evaluation: patient ambulated 110 feet with CGA x1 and rolling walker for support, verbal  cues to stay close to walker however unable to correct L LE ER which increases forward flexed posture. NO LOB or right knee buckling noticed. Gait distance continue to be limited by weakness and fatigue.     Time Calculation:    PT Charges     Row Name 12/10/22 0943             Time Calculation    Start Time 0915  -AS      PT Received On 12/10/22  -AS      PT Goal Re-Cert Due Date 12/14/22  -AS         Timed Charges    55960 - PT Therapeutic Exercise Minutes 10  -AS      65589 - Gait Training Minutes  13  -AS         Total Minutes    Timed Charges Total Minutes 23  -AS       Total Minutes 23  -AS            User Key  (r) = Recorded By, (t) = Taken By, (c) = Cosigned By    Initials Name Provider Type    AS Ana Cristina Gutierrez PTA Physical Therapist Assistant              Therapy Charges for Today     Code Description Service Date Service Provider Modifiers Qty    39550134734 HC PT THER PROC EA 15 MIN 12/10/2022 Ana Cristina Gutierrez PTA GP 1    [id number] HC GAIT TRAINING EA 15 MIN 12/10/2022 Ana Cristina Gutierrez PTA GP 1          PT G-Codes  Outcome Measure Options: AM-PAC 6 Clicks Basic Mobility (PT)  AM-PAC 6 Clicks Score (PT): 20  AM-PAC 6 Clicks Score (OT): 16       Ana Cristina Gutierrez PTA  12/10/2022

## 2022-12-10 NOTE — PROGRESS NOTES
[facility] Progress Note        Antibiotics:  Anti-Infectives (From admission, onward)    Ordered     Dose/Rate Route Frequency Start Stop    12/09/22 1158  vancomycin 1250 mg/250 mL 0.9% NS IVPB ([facility])        Ordering Provider: Eulogio Verdugo, PharmD    1,250 mg  over 90 Minutes Intravenous Every 12 Hours 12/09/22 1230 01/08/23 0044    12/02/22 1159  ceftaroline (TEFLARO) 600 mg/100 mL 0.9% NS ([facility])        Ordering Provider: Bryce Euceda MD    600 mg Intravenous Every 12 Hours Scheduled 12/02/22 1500 01/08/23 2059    12/01/22 0933  vancomycin 1750 mg/500 mL 0.9% NS IVPB ([facility])        Ordering Provider: Bryce Euceda MD    20 mg/kg × 88.9 kg  over 120 Minutes Intravenous Once 12/01/22 1030 12/01/22 1310    12/01/22 0919  Pharmacy to dose vancomycin        Ordering Provider: Bryce Euceda MD     Does not apply Continuous PRN 12/01/22 0919 12/29/22 0918    11/30/22 1449  ceFAZolin in dextrose (ANCEF) IVPB solution 2 g        Ordering Provider: Brain Bentley MD    2 g  over 30 Minutes Intravenous Every 8 Hours 11/30/22 1800 12/01/22 0151    11/30/22 0815  ceFAZolin in dextrose (ANCEF) IVPB solution 2 g        Ordering Provider: Brain Bentley MD    2 g  over 30 Minutes Intravenous Once 11/30/22 0817 11/30/22 1045    11/30/22 0815  vancomycin 1250 mg/250 mL 0.9% NS IVPB ([facility])        Ordering Provider: Brain Bentley MD    15 mg/kg × 88.9 kg Intravenous Once 11/30/22 0817 11/30/22 0902          CC: fatigue    HPI:    Patient is a 57 y.o.  Yr old male with history of cirrhosis/diabetes and other comorbidity as outlined below.  History of left foot gas gangrene with osteomyelitis and MRSA bacteremia treated in [place] by Dr. Giordano in May/June 2022.  Family reports left transmetatarsal amputation in approximately 8 weeks IV vancomycin.  Notes from [place] indicate transthoracic echocardiogram no vegetation per Dr. Giordano; he thinks he might of had several weeks of oral antibiotic after that but not  entirely clear.  He is admitted to [facility] September 20 with progressive right knee pain occurring over the past week preceding admission.  He thinks he might of twisted it but ended up with progressive redness/swelling and pain.  No specific blunt force or penetrating trauma.  he was evaluated by orthopedics and taken to the operating room for right knee incision/drainage and concern for septic arthritis per Dr. Bentley; blood cultures had  MRSA.  Had dysuria but urinalysis not consistent with UTI.  No hematuria or pyuria     9/23 with TV echodensity; 9/26/22  TYRESE no vegetation per cardiology; daptomycin maintained, transferred to [place] with care taken over by Dr. Giordano; repeat blood cultures there on October 18 and October 20 and October 22 (dapto STU = 1)  with MRSA; blood cultures on October 24 and November 12 were negative per microbiology.  Repeat blood cultures November 20 with MRSA and daptomycin STU equal to 3, not susceptible per my discussion with microbiology; blood cultures positive again on November 22 but negative November 23. per Dr Giordano, teflaro had been added and concern regarding right knee with increased swelling/pain prompted transitioned back to [place] for further right knee surgery on November 30 by Dr Bentley     **I d/w Dr Giordano; he felt there was an obvious right arm PICC line infection in October with changes at the exit site and concern it was the reason for positive blood cultures at that time.  No catheter tip culture available per micro.  In November, recurrent bacteremia associated with right knee swelling/pain but no other focal symptoms per my discussion with Dr. Giordano; at risk for sequestered/metastatic focus    12/5/22 d/w Dr Giordano and  plans to get back to [facility];  Dr Giordano will facilitate additional radiographic workup if needed depending on clinical course    12/8/22 discussed with Dr. Giordano and Dr SHEEHAN again     12/9/22 d/w Dr SHEEHAN;  He came from [facility]  rehab in [place] per him; he had been given teflaro there previously per Dr Giordano    12/10/22 Case management continues to look into options.  No new distress per nursing.  Doing okay overall. he denies any new focal pain or symptomatology. postop right knee with controlled pain,  dull at present,  Better controlled per nursing overall, nonradiating, worse with movement, better with pain meds and 2 out of 10 in severity at present; he denies any other focal musculoskeletal pain.  No new back pain but he does relate chronic lumbar pain, dull at present, worse with movement, not progressive and no new weakness or numbness. No myalgia     Left foot with no redness/swelling or pain.  Surgical site healed with no open wound or active drainage. Has a left arm midline     No headache photophobia or neck stiffness.  No nausea vomiting diarrhea or abdominal pain.  No shortness of breath cough or hemoptysis.  No other new skin rash.  No other recent procedures or interventions.    No indwelling pacemaker        ROS:      12/10/22 No f/c/s. No n/v/d. No rash. No new ADR to Abx.     Constitutional-- No Fever, chills or sweats.  Appetite diminished with fatigue.  Heent-- No new vision, hearing or throat complaints.  No epistaxis or oral sores.  Denies odynophagia or dysphagia.  No flashers, floaters or eye pain.   No headache, photophobia or neck stiffness.  CV-- No chest pain, palpitation or syncope  Resp-- No SOB/cough/Hemoptysis  GI- No nausea, vomiting, or diarrhea.  No hematochezia, melena, or hematemesis. Denies jaundice  -- No dysuria, hematuria, or flank pain.  Denies hesitancy, urgency or flank pain.  Lymph- no swollen lymph nodes in neck/axilla or groin.   Heme- No active bruising or bleeding; no Hx of DVT or PE.  MS-- no swelling or pain in the bones or joints of arms/legs.  No new back pain.  Neuro-- No acute focal weakness or numbness in the arms or legs.  No seizures.     Full 12 point review of systems reviewed  "and negative otherwise for acute complaints, except for above      PE: nursing/chaperone present  /54 (BP Location: Left arm, Patient Position: Lying)   Pulse 74   Temp 98.3 °F (36.8 °C) (Oral)   Resp 17   Ht 177.8 cm (70\")   Wt 88.9 kg (196 lb)   SpO2 95%   BMI 28.12 kg/m²     GENERAL: Awake and alert, in no acute distress.  Chronically ill-appearing  HEENT: Normocephalic, atraumatic.    No conjunctival injection. No icterus. Oropharynx clear without evidence of thrush or exudate. No evidence of peridontal disease.    NECK: Supple without nuchal rigidity. No mass.   HEART: RRR; soft murmur LSB, rubs, gallops.   LUNGS: Diminished at bases but otherwise clear to auscultation bilaterally without wheezing, rales, rhonchi. Normal respiratory effort. Nonlabored. No dullness.  ABDOMEN: Soft, nontender, nondistended. Positive bowel sounds. No rebound or guarding. NO mass or HSM.  EXT:  No cyanosis, clubbing or edema. No cord.   MSK: FROM without joint effusions noted arms/legs.    SKIN: Warm and dry without cutaneous eruptions on Inspection/palpation.    NEURO: Oriented to PPT. No focal deficits on motor/sensory exam at arms/legs.     Left foot with no redness induration or warmth.  No discrete mass bulge or fluctuance.  No crepitus or bulla.  Prior surgical site healed at [facility].  No open wound or active drainage     Right knee postop surgical site no new redness or purulence.    No discrete mass bulge or fluctuance.  No crepitus or bulla. Better range of motion     No peripheral stigmata/phenomena of endocarditis     IV without obvious redness or drainage    Laboratory Data    Results from last 7 days   Lab Units 12/06/22  0534   HEMOGLOBIN g/dL 7.3*   HEMATOCRIT % 23.3*     Results from last 7 days   Lab Units 12/07/22  0432   SODIUM mmol/L 139   POTASSIUM mmol/L 4.3   CHLORIDE mmol/L 105   CO2 mmol/L 27.0   BUN mg/dL 13   CREATININE mg/dL 0.70*   GLUCOSE mg/dL 151*   CALCIUM mg/dL 7.7*               " "    Estimated Creatinine Clearance: 130.8 mL/min (A) (by C-G formula based on SCr of 0.7 mg/dL (L)).      Microbiology:      Radiology:  Imaging Results (Last 72 Hours)     ** No results found for the last 72 hours. **            Impression:       --Acute/recurrent/persistent MRSA bacteremia/septicemia; prior history positive cultures May 2022 and recurrent despite prior  IV vancomycin and left foot surgery in addition to other supportive measures.   TTE with ? TV echodensity prior admit in September and TYRESE no vegetation at that time at [facility];   blood culture positivity again at [facility] in October/November as above.  Daptomycin STU increased to 3 on 11/20 isolate and not sensitive to daptomycin per microbiology.  Discussed on several occasions with Dr. Giordano; had a repeat TYRESE on October 31 with mention of \" moderate size echodense structure above the posterior tricuspid leaflet but not attached to the leaflet\" per cardiology there, described potentially as a \"normal variant\" per their note.  High risk for further serious morbidity and other serious sequela including persistent/progressive or recurrent infection, metastatic foci of involvement and other dire consequences;  vancomycin sensitivity maintained albeit with STU =2; adjusted to vancomycin/ceftaroline in light of daptomycin resistance and I asked microbiology     **Blood cultures November 23 negative so far    **repeat TYRESE 12/1 ; no definitive vegetation per cardiology and only mild tricuspid valve regurgitation stable from prior exam per them.  Ill-defined area in the right atrium appeared stable to them. D/w Dr Trent; you still may need to consider PET scan and likely to require repeat echocardiography during his course.  I have discussed with Dr. Giordano and he will arrange     --Acute right knee pain/septic arthritis with surgical intervention, incision/debridement by Dr. Bentley on September 21.   MRSA+ in the setting of MRSA bacteremia at " that time;  Repeat surgery 11/30, d/w Dr Bentley. Cultures November 30 negative     --History of cirrhosis by records.  Unclear etiology.   further GI referral/eval per  medicine team     --Diabetes.  You need to tightly control blood sugar to give best chance for healing.;  Described as uncontrolled by medicine team at admission     -- Chronic lumbar back pain for decades.  This is not new, not worse and no new location.  No new exacerbation or new neurologic symptomatology in his extremities.  MRI 12/3 suboptimal per radiology;  may require further imaging depending on his clinical course to help exclude any other sequestered/spinal-paraspinal focus depending on clinical course     --TCP ; monitor     PLAN:       -- IV vancomycin/ceftaroline potentially 6 weeks but to depend on clinical course/study results and response to therapy.  High risk for occult endovascular focus with multiple past positive blood cultures, most recently November 20 and 22; after IV antibiotics, depending on clinical course, may require chronic oral suppression step down (likely to be decided by Dr. Giordano )     -- Check/review labs cultures and scans     -- Partial history per nursing staff     --d/w pharmacy       -- Highly complex set of issues with high risk for further serious morbidity and other serious sequela     -- Discussed with microbiology; they are doing further assessment to blood cultures drawn November 22     --d/w Dr Giordano and Dr BARBRA Giordano to help with disposition       --  new PICC line,   No specific new pain or other suppurative changes at the surface at either site.  I discussed with Dr. Trent regarding additional diagnostics for endocarditis such as PET scan; this still may need to be considered as outpatient depending on clinical course.  see above.     **MRI's noted without specific focus at Lumbar spine or foot although spinal imaging suboptimal per radiology; I d/w Dr Giordano with plans for transition back to  LTACH there; depending on clinical course/symptomatology, Dr. Giordano will consider white blood cell scan versus repeat imaging to evaluate for potential occult focus.    ** 12/9 d/w Dr SHEEHAN;  He came from [facility] in [place] per patient; he had been given teflaro there previously per Dr Giordano; case management continues to consider options.  May require white blood cell scan    **Copied text in this note has been reviewed and is accurate as of 12/10/22.        Bryce Euceda MD  12/10/2022

## 2022-12-10 NOTE — PLAN OF CARE
Goal Outcome Evaluation:  Plan of Care Reviewed With: patient        Progress: improving  Outcome Evaluation: patient ambulated 110 feet with CGA x1 and rolling walker for support, verbal cues to stay close to walker however unable to correct L LE ER which increases forward flexed posture. NO LOB or right knee buckling noticed. Gait distance continue to be limited by weakness and fatigue.

## 2022-12-10 NOTE — PROGRESS NOTES
"IM progress note      Melchor Hardwick III  [id number]  1965     LOS: 10 days     Attending: Brain Bentley MD    Primary Care Provider: Missy Up APRN      Chief Complaint/Reason for visit:  No chief complaint on file.      Subjective   No new complaints. Doing well. Denies f/c/n/v/sob/cp.    Objective        Visit Vitals  /67   Pulse 80   Temp 97.8 °F (36.6 °C) (Oral)   Resp 18   Ht 177.8 cm (70\")   Wt 88.9 kg (196 lb)   SpO2 95%   BMI 28.12 kg/m²     Temp (24hrs), Av.1 °F (36.7 °C), Min:97.8 °F (36.6 °C), Max:98.3 °F (36.8 °C)      Intake/Output:    Intake/Output Summary (Last 24 hours) at 12/10/2022 1323  Last data filed at 12/10/2022 1119  Gross per 24 hour   Intake 520 ml   Output 2000 ml   Net -1480 ml         Physical Exam:     General Appearance:    Alert, cooperative, in no acute distress   Head:    Normocephalic, without obvious abnormality, atraumatic    Lungs:     Normal effort, symmetric chest rise,  clear to      auscultation bilaterally              Heart:    Regular rhythm and normal rate, normal S1 and S2    Abdomen:     Normal bowel sounds, no masses, no organomegaly, soft        non-tender, non-distended, no guarding, no rebound                tenderness.  Abdominal binder in place   Extremities: CDI optifoam over knee incision.   Flexion and dorsiflexion intact bilateral feet.   Pulses:   Pulses palpable and equal bilaterally   Skin:   No bleeding, bruising or rash          Results Review:     I reviewed the patient's new clinical results.   Results from last 7 days   Lab Units 22  0534   HEMOGLOBIN g/dL 7.3*   HEMATOCRIT % 23.3*     Results from last 7 days   Lab Units 22  0432 22  0534 22  0441   SODIUM mmol/L 139 136 137   POTASSIUM mmol/L 4.3 4.1 4.2   CHLORIDE mmol/L 105 103 104   CO2 mmol/L 27.0 28.0 28.0   BUN mg/dL 13 13 11   CREATININE mg/dL 0.70* 0.72* 0.56*   CALCIUM mg/dL 7.7* 8.0* 8.0*   GLUCOSE mg/dL 151* 132* 198*   TYRESE   •  Indication " for TYRESE -to rule out infective endocarditis in a patient with MRSA bacteremia.  •  Findings-  •  Left ventricular systolic function is normal. Estimated left ventricular EF = 60%  •  Left atrial appendage is well visualized and is free of thrombus.  •  Saline test was negative for the evidence of shunt in interatrial septum.  •  The tricuspid valve appeared normal in structure. There was a moderate sized echodense structure seen above  the posterior tricuspid leaflet but not attached to the leaflet. The appearance and location are not typical for regurgitation. This structure could be a normal variant. No evidence of tricuspid valve stenosis or significant regurgitations.  •  Other valves also appear normal in structure with no evidence of stenosis or significant regurgitations.  No vegetations seen on these valves.  •  There is no evidence of pericardial effusion.  •  Comment-  •  In view of presence of MRSA bacteremia, recommend to extend antibiotic therapy for possible infective endocarditis and repeat TYRESE in 3 months.            Latest Reference Range & Units 12/09/22 20:29 12/10/22 08:20 12/10/22 11:00   Glucose 70 - 130 mg/dL 197 (H) 136 (H) 132 (H)   (H): Data is abnormally high    All medications reviewed.   acetaminophen, 1,000 mg, Oral, Q8H  aspirin, 81 mg, Oral, Q12H  ceftaroline, 600 mg, Intravenous, Q12H  insulin detemir, 20 Units, Subcutaneous, Q12H  insulin lispro, 0-7 Units, Subcutaneous, TID AC  Insulin Lispro, 7 Units, Subcutaneous, TID With Meals  levothyroxine, 25 mcg, Oral, Q AM  meloxicam, 15 mg, Oral, Daily  midodrine, 2.5 mg, Oral, TID AC  pantoprazole, 40 mg, Oral, Q AM  polyethylene glycol, 17 g, Oral, Daily  pregabalin, 150 mg, Oral, Q12H  sodium chloride, 10 mL, Intravenous, Q12H  vancomycin, 1,250 mg, Intravenous, Q12H      cyclobenzaprine, 5 mg, TID PRN  dextrose, 25 g, Q15 Min PRN  dextrose, 15 g, Q15 Min PRN  diphenhydrAMINE, 25 mg, Q6H PRN   Or  diphenhydrAMINE, 25 mg, Q6H  PRN  glucagon (human recombinant), 1 mg, Q15 Min PRN  HYDROmorphone, 0.5 mg, Q2H PRN   And  naloxone, 0.1 mg, Q5 Min PRN  labetalol, 10 mg, Q4H PRN  ondansetron, 4 mg, Q6H PRN   Or  ondansetron, 4 mg, Q6H PRN  oxyCODONE, 10 mg, Q4H PRN  oxyCODONE, 5 mg, Q4H PRN  Pharmacy to dose vancomycin, , Continuous PRN  sodium chloride, 500 mL, TID PRN  sodium chloride, 10 mL, PRN        Assessment & Plan       S/P right knee implant removal with insertion of antibiotic spacer    Infection of right knee (HCC)    Type 2 diabetes mellitus with hyperglycemia, with long-term current use of insulin (HCC)    Hypothyroid    MRSA sepsis    Chronic low back pain    PICC (peripherally inserted central catheter) in place      Plan  1. PT/OT, weightbearing per protocol.  2. Pain control-prns   3. IS-encouraged  4. DVT proph-mechanicals, aspirin.  5. Bowel regimen  6. Monitor post-op labs  7. DC planning,ongoing.  following     Hypothyroid  -Continue home Synthroid     DM  - hgb A1c on 11/28/2022 7.3  -Continue home bolus insulin with meals and long-acting insulin twice daily/ doses increased 12/1.   - Accu-Chek AC and HS with low dose SSI    TYRESE, done, noted, no obvious vegetation.    No infectious focus evident on MRIs.    Antibiotics per ID, Dr. Euceda is following   Plan on tagged WBC scan       ROSALINA Fernandez  12/10/22  13:23 EST

## 2022-12-10 NOTE — PLAN OF CARE
Goal Outcome Evaluation:   Patient has been resting comfortably with no acute signs of distress. VSS. AO x4. No complaints of pain overnight. Dressing C/D/I. PICC in place with no complications. Will continue to monitor as patient progresses in their care.

## 2022-12-11 LAB
ALBUMIN SERPL-MCNC: 2.2 G/DL (ref 3.5–5.2)
ALBUMIN/GLOB SERPL: 0.7 G/DL
ALP SERPL-CCNC: 271 U/L (ref 39–117)
ALT SERPL W P-5'-P-CCNC: 41 U/L (ref 1–41)
ANION GAP SERPL CALCULATED.3IONS-SCNC: 5 MMOL/L (ref 5–15)
AST SERPL-CCNC: 72 U/L (ref 1–40)
BILIRUB SERPL-MCNC: 0.5 MG/DL (ref 0–1.2)
BUN SERPL-MCNC: 16 MG/DL (ref 6–20)
BUN/CREAT SERPL: 26.2 (ref 7–25)
CALCIUM SPEC-SCNC: 8.2 MG/DL (ref 8.6–10.5)
CHLORIDE SERPL-SCNC: 109 MMOL/L (ref 98–107)
CO2 SERPL-SCNC: 27 MMOL/L (ref 22–29)
CREAT SERPL-MCNC: 0.61 MG/DL (ref 0.76–1.27)
DEPRECATED RDW RBC AUTO: 52.9 FL (ref 37–54)
EGFRCR SERPLBLD CKD-EPI 2021: 112 ML/MIN/1.73
ERYTHROCYTE [DISTWIDTH] IN BLOOD BY AUTOMATED COUNT: 17.6 % (ref 12.3–15.4)
GLOBULIN UR ELPH-MCNC: 3.2 GM/DL
GLUCOSE BLDC GLUCOMTR-MCNC: 143 MG/DL (ref 70–130)
GLUCOSE BLDC GLUCOMTR-MCNC: 145 MG/DL (ref 70–130)
GLUCOSE BLDC GLUCOMTR-MCNC: 180 MG/DL (ref 70–130)
GLUCOSE BLDC GLUCOMTR-MCNC: 210 MG/DL (ref 70–130)
GLUCOSE BLDC GLUCOMTR-MCNC: 79 MG/DL (ref 70–130)
GLUCOSE BLDC GLUCOMTR-MCNC: 81 MG/DL (ref 70–130)
GLUCOSE SERPL-MCNC: 67 MG/DL (ref 65–99)
HCT VFR BLD AUTO: 24 % (ref 37.5–51)
HGB BLD-MCNC: 7.5 G/DL (ref 13–17.7)
MCH RBC QN AUTO: 25.5 PG (ref 26.6–33)
MCHC RBC AUTO-ENTMCNC: 31.3 G/DL (ref 31.5–35.7)
MCV RBC AUTO: 81.6 FL (ref 79–97)
PLATELET # BLD AUTO: 79 10*3/MM3 (ref 140–450)
PMV BLD AUTO: 10.5 FL (ref 6–12)
POTASSIUM SERPL-SCNC: 4.2 MMOL/L (ref 3.5–5.2)
PROT SERPL-MCNC: 5.4 G/DL (ref 6–8.5)
RBC # BLD AUTO: 2.94 10*6/MM3 (ref 4.14–5.8)
SODIUM SERPL-SCNC: 141 MMOL/L (ref 136–145)
VANCOMYCIN SERPL-MCNC: 35.2 MCG/ML (ref 5–40)
WBC NRBC COR # BLD: 3.39 10*3/MM3 (ref 3.4–10.8)

## 2022-12-11 PROCEDURE — 80202 ASSAY OF VANCOMYCIN: CPT

## 2022-12-11 PROCEDURE — 97116 GAIT TRAINING THERAPY: CPT

## 2022-12-11 PROCEDURE — 80053 COMPREHEN METABOLIC PANEL: CPT | Performed by: INTERNAL MEDICINE

## 2022-12-11 PROCEDURE — 25010000002 VANCOMYCIN 10 G RECONSTITUTED SOLUTION

## 2022-12-11 PROCEDURE — 97530 THERAPEUTIC ACTIVITIES: CPT

## 2022-12-11 PROCEDURE — 63710000001 INSULIN LISPRO (HUMAN) PER 5 UNITS: Performed by: INTERNAL MEDICINE

## 2022-12-11 PROCEDURE — 25010000002 CEFTAROLINE FOSAMIL PER 10 MG: Performed by: INTERNAL MEDICINE

## 2022-12-11 PROCEDURE — 85027 COMPLETE CBC AUTOMATED: CPT | Performed by: INTERNAL MEDICINE

## 2022-12-11 PROCEDURE — 63710000001 INSULIN DETEMIR PER 5 UNITS: Performed by: INTERNAL MEDICINE

## 2022-12-11 PROCEDURE — 82962 GLUCOSE BLOOD TEST: CPT

## 2022-12-11 RX ORDER — FUROSEMIDE 20 MG/1
20 TABLET ORAL ONCE
Status: COMPLETED | OUTPATIENT
Start: 2022-12-11 | End: 2022-12-11

## 2022-12-11 RX ADMIN — FUROSEMIDE 20 MG: 20 TABLET ORAL at 12:01

## 2022-12-11 RX ADMIN — Medication 10 ML: at 08:53

## 2022-12-11 RX ADMIN — MIDODRINE HYDROCHLORIDE 2.5 MG: 5 TABLET ORAL at 06:14

## 2022-12-11 RX ADMIN — CEFTAROLINE FOSAMIL 600 MG: 600 POWDER, FOR SOLUTION INTRAVENOUS at 20:57

## 2022-12-11 RX ADMIN — INSULIN DETEMIR 20 UNITS: 100 INJECTION, SOLUTION SUBCUTANEOUS at 20:57

## 2022-12-11 RX ADMIN — VANCOMYCIN HYDROCHLORIDE 1250 MG: 10 INJECTION, POWDER, LYOPHILIZED, FOR SOLUTION INTRAVENOUS at 12:09

## 2022-12-11 RX ADMIN — INSULIN LISPRO 7 UNITS: 100 INJECTION, SOLUTION INTRAVENOUS; SUBCUTANEOUS at 12:02

## 2022-12-11 RX ADMIN — INSULIN LISPRO 7 UNITS: 100 INJECTION, SOLUTION INTRAVENOUS; SUBCUTANEOUS at 08:50

## 2022-12-11 RX ADMIN — LEVOTHYROXINE SODIUM 25 MCG: 25 TABLET ORAL at 06:14

## 2022-12-11 RX ADMIN — ASPIRIN 81 MG: 81 TABLET, COATED ORAL at 08:51

## 2022-12-11 RX ADMIN — PREGABALIN 150 MG: 150 CAPSULE ORAL at 20:57

## 2022-12-11 RX ADMIN — MIDODRINE HYDROCHLORIDE 2.5 MG: 5 TABLET ORAL at 12:01

## 2022-12-11 RX ADMIN — PANTOPRAZOLE SODIUM 40 MG: 40 TABLET, DELAYED RELEASE ORAL at 06:14

## 2022-12-11 RX ADMIN — ACETAMINOPHEN 1000 MG: 500 TABLET, FILM COATED ORAL at 20:57

## 2022-12-11 RX ADMIN — ASPIRIN 81 MG: 81 TABLET, COATED ORAL at 20:57

## 2022-12-11 RX ADMIN — ACETAMINOPHEN 1000 MG: 500 TABLET, FILM COATED ORAL at 06:14

## 2022-12-11 RX ADMIN — ACETAMINOPHEN 1000 MG: 500 TABLET, FILM COATED ORAL at 13:48

## 2022-12-11 RX ADMIN — MIDODRINE HYDROCHLORIDE 2.5 MG: 5 TABLET ORAL at 17:47

## 2022-12-11 RX ADMIN — CEFTAROLINE FOSAMIL 600 MG: 600 POWDER, FOR SOLUTION INTRAVENOUS at 08:49

## 2022-12-11 RX ADMIN — INSULIN LISPRO 7 UNITS: 100 INJECTION, SOLUTION INTRAVENOUS; SUBCUTANEOUS at 17:47

## 2022-12-11 RX ADMIN — PREGABALIN 150 MG: 150 CAPSULE ORAL at 08:51

## 2022-12-11 RX ADMIN — VANCOMYCIN HYDROCHLORIDE 1250 MG: 10 INJECTION, POWDER, LYOPHILIZED, FOR SOLUTION INTRAVENOUS at 01:38

## 2022-12-11 RX ADMIN — INSULIN DETEMIR 20 UNITS: 100 INJECTION, SOLUTION SUBCUTANEOUS at 08:51

## 2022-12-11 RX ADMIN — MELOXICAM 15 MG: 15 TABLET ORAL at 08:51

## 2022-12-11 NOTE — PROGRESS NOTES
"Pharmacy Consult-Vancomycin Dosing  Melchor Hardwick III is a  57 y.o. male receiving vancomycin therapy.     Indication: Bacteremia, bone/joint infection, endocarditis  Consulting Provider: Dr Euceda  ID Consult: Yes    Goal AUC: 400 - 600 mg/L*hr    Current Antimicrobial Therapy  Vancomycin - pharmacy dosing  Ceftaroline 600mg IV q12h    Allergies  Allergies as of 11/23/2022    (No Known Allergies)       Labs    Results from last 7 days   Lab Units 12/11/22  0358 12/10/22  1307 12/07/22  0432   BUN mg/dL 16 16 13   CREATININE mg/dL 0.61* 0.77 0.70*       Results from last 7 days   Lab Units 12/11/22  0358   WBC 10*3/mm3 3.39*         Evaluation of Dosing     Last Dose Received in the ED/Outside Facility: 1250mg received as ppx on 11/30 @0902  Is Patient on Dialysis or Renal Replacement: no    Ht - 177.8 cm (70\")  Wt - 88.9 kg (196 lb)    Estimated Creatinine Clearance: 150.1 mL/min (A) (by C-G formula based on SCr of 0.61 mg/dL (L)).    Intake & Output (last 3 days)         12/08 0701  12/09 0700 12/09 0701  12/10 0700 12/10 0701  12/11 0700 12/11 0701  12/12 0700    P.O. 960 275 245     Total Intake(mL/kg) 960 (10.8) 275 (3.1) 245 (2.8)     Urine (mL/kg/hr) 2550 (1.2) 2450 (1.1) 2975 (1.4)     Stool 0  0     Total Output 2550 2450 2975     Net -1590 -2175 -2730             Stool Unmeasured Occurrence 1 x  1 x             Microbiology and Radiology  Microbiology Results (last 10 days)       ** No results found for the last 240 hours. **            Reported Vancomycin Levels    Results from last 7 days   Lab Units 12/11/22  0358 12/05/22  0441   VANCOMYCIN RM mcg/mL 35.20 17.40             [employer] AUC Calculation:    Current AUC:   566 mg/L*hr    Predicted Steady State AUC on Current Dose: 524 mg/L*hr  _________________________________    Predicted Steady State AUC on New Dose:   524 mg/L*hr    Assessment/Plan:    1. Pharmacy to dose vancomycin for bacteremia. Goal AUC: 400-600 mg/L*hr.   2. Patient currently on a " maintenance regimen of vancomycin 1250 mg IV q12h. (~14 mg/kg)  3. Vancomycin level on 12/11 was 35.2 mg/L, likely reflective of true peak after most recent dose. Results in therapeutic AUC. No evidence of nephrotoxicity at this point (Scr and UOP remain stable).   4. Patient has been fairly stable on this dose for ~10 days. Will plan for next level to be checked 12/14.   5. Monitor renal function, cultures and sensitivities, and clinical status, and adjust regimen as necessary.  Pharmacy will continue to follow.    Dano Acevedo PharmD  12/11/22  07:40 EST

## 2022-12-11 NOTE — PROGRESS NOTES
[facility] Progress Note        Antibiotics:  Anti-Infectives (From admission, onward)    Ordered     Dose/Rate Route Frequency Start Stop    12/09/22 1158  vancomycin 1250 mg/250 mL 0.9% NS IVPB ([facility])        Ordering Provider: Eulogio Verdugo, PharmD    1,250 mg  over 90 Minutes Intravenous Every 12 Hours 12/09/22 1230 01/08/23 0044    12/02/22 1159  ceftaroline (TEFLARO) 600 mg/100 mL 0.9% NS ([facility])        Ordering Provider: Bryce Euceda MD    600 mg Intravenous Every 12 Hours Scheduled 12/02/22 1500 01/08/23 2059    12/01/22 0933  vancomycin 1750 mg/500 mL 0.9% NS IVPB ([facility])        Ordering Provider: Bryce Euceda MD    20 mg/kg × 88.9 kg  over 120 Minutes Intravenous Once 12/01/22 1030 12/01/22 1310    12/01/22 0919  Pharmacy to dose vancomycin        Ordering Provider: Bryce Euceda MD     Does not apply Continuous PRN 12/01/22 0919 12/29/22 0918    11/30/22 1449  ceFAZolin in dextrose (ANCEF) IVPB solution 2 g        Ordering Provider: Brain Bentley MD    2 g  over 30 Minutes Intravenous Every 8 Hours 11/30/22 1800 12/01/22 0151    11/30/22 0815  ceFAZolin in dextrose (ANCEF) IVPB solution 2 g        Ordering Provider: Brain Bentley MD    2 g  over 30 Minutes Intravenous Once 11/30/22 0817 11/30/22 1045    11/30/22 0815  vancomycin 1250 mg/250 mL 0.9% NS IVPB ([facility])        Ordering Provider: Brain Bentley MD    15 mg/kg × 88.9 kg Intravenous Once 11/30/22 0817 11/30/22 0902          CC: fatigue    HPI:    Patient is a 57 y.o.  Yr old male with history of cirrhosis/diabetes and other comorbidity as outlined below.  History of left foot gas gangrene with osteomyelitis and MRSA bacteremia treated in [place] by Dr. Giordano in May/June 2022.  Family reports left transmetatarsal amputation in approximately 8 weeks IV vancomycin.  Notes from [place] indicate transthoracic echocardiogram no vegetation per Dr. Giordano; he thinks he might of had several weeks of oral antibiotic after that but not  entirely clear.  He is admitted to [facility] September 20 with progressive right knee pain occurring over the past week preceding admission.  He thinks he might of twisted it but ended up with progressive redness/swelling and pain.  No specific blunt force or penetrating trauma.  he was evaluated by orthopedics and taken to the operating room for right knee incision/drainage and concern for septic arthritis per Dr. Bentley; blood cultures had  MRSA.  Had dysuria but urinalysis not consistent with UTI.  No hematuria or pyuria     9/23 with TV echodensity; 9/26/22  TYRESE no vegetation per cardiology; daptomycin maintained, transferred to [place] with care taken over by Dr. Giordano; repeat blood cultures there on October 18 and October 20 and October 22 (dapto STU = 1)  with MRSA; blood cultures on October 24 and November 12 were negative per microbiology.  Repeat blood cultures November 20 with MRSA and daptomycin STU equal to 3, not susceptible per my discussion with microbiology; blood cultures positive again on November 22 but negative November 23. per Dr Giordano, teflaro had been added and concern regarding right knee with increased swelling/pain prompted transitioned back to [place] for further right knee surgery on November 30 by Dr Bentley     **I d/w Dr Giordano; he felt there was an obvious right arm PICC line infection in October with changes at the exit site and concern it was the reason for positive blood cultures at that time.  No catheter tip culture available per micro.  In November, recurrent bacteremia associated with right knee swelling/pain but no other focal symptoms per my discussion with Dr. Giordano; at risk for sequestered/metastatic focus    12/5/22 d/w Dr Giordano and  plans to get back to [facility];  Dr Giordano will facilitate additional radiographic workup if needed depending on clinical course    12/8/22 discussed with Dr. Giordano and Dr SHEEHAN again     12/9/22 d/w Dr SHEEHAN;  He came from [facility]  rehab in [place] per him; he had been given teflaro there previously per Dr Giordano    12/11/22 vancomycin level of 35 reflects vancomycin peak per pharmacy.  No new adverse effect per nursing.  Case management continues to look into options.  No new distress per nursing.  Doing okay overall. he denies any new focal pain or symptomatology. postop right knee with controlled pain,  dull at present,  Better controlled per nursing overall, nonradiating, worse with movement, better with pain meds and 2 out of 10 in severity at present; he denies any other focal musculoskeletal pain.  No new back pain but he does relate chronic lumbar pain, dull at present, worse with movement, not progressive and no new weakness or numbness. No myalgia     Left foot with no redness/swelling or pain.  Surgical site healed with no open wound or active drainage. Has a left arm midline     No headache photophobia or neck stiffness.  No nausea vomiting diarrhea or abdominal pain.  No shortness of breath cough or hemoptysis.  No other new skin rash.  No other recent procedures or interventions.    No indwelling pacemaker        ROS:      12/11/22 No f/c/s. No n/v/d. No rash. No new ADR to Abx.     Constitutional-- No Fever, chills or sweats.  Appetite diminished with fatigue.  Heent-- No new vision, hearing or throat complaints.  No epistaxis or oral sores.  Denies odynophagia or dysphagia.  No flashers, floaters or eye pain.   No headache, photophobia or neck stiffness.  CV-- No chest pain, palpitation or syncope  Resp-- No SOB/cough/Hemoptysis  GI- No nausea, vomiting, or diarrhea.  No hematochezia, melena, or hematemesis. Denies jaundice  -- No dysuria, hematuria, or flank pain.  Denies hesitancy, urgency or flank pain.  Lymph- no swollen lymph nodes in neck/axilla or groin.   Heme- No active bruising or bleeding; no Hx of DVT or PE.  MS-- no swelling or pain in the bones or joints of arms/legs.  No new back pain.  Neuro-- No acute focal  "weakness or numbness in the arms or legs.  No seizures.     Full 12 point review of systems reviewed and negative otherwise for acute complaints, except for above      PE: nursing/chaperone present  /63 (BP Location: Left arm, Patient Position: Lying)   Pulse 66   Temp 98 °F (36.7 °C) (Oral)   Resp 17   Ht 177.8 cm (70\")   Wt 88.9 kg (196 lb)   SpO2 96%   BMI 28.12 kg/m²     GENERAL: Awake and alert, in no acute distress.  Chronically ill-appearing  HEENT: Normocephalic, atraumatic.    No conjunctival injection. No icterus. Oropharynx clear without evidence of thrush or exudate. No evidence of peridontal disease.    NECK: Supple without nuchal rigidity. No mass.   HEART: RRR; soft murmur LSB, rubs, gallops.   LUNGS: Diminished at bases but otherwise clear to auscultation bilaterally without wheezing, rales, rhonchi. Normal respiratory effort. Nonlabored. No dullness.  ABDOMEN: Soft, nontender, nondistended. Positive bowel sounds. No rebound or guarding. NO mass or HSM.  EXT:  No cyanosis, clubbing or edema. No cord.   MSK: FROM without joint effusions noted arms/legs.    SKIN: Warm and dry without cutaneous eruptions on Inspection/palpation.    NEURO: Oriented to PPT. No focal deficits on motor/sensory exam at arms/legs.     Left foot with no redness induration or warmth.  No discrete mass bulge or fluctuance.  No crepitus or bulla.  Prior surgical site healed at [facility].  No open wound or active drainage     Right knee postop surgical site no new redness or purulence.    No discrete mass bulge or fluctuance.  No crepitus or bulla. Better range of motion     No peripheral stigmata/phenomena of endocarditis     IV without obvious redness or drainage    Laboratory Data    Results from last 7 days   Lab Units 12/11/22  0358 12/06/22  0534   WBC 10*3/mm3 3.39*  --    HEMOGLOBIN g/dL 7.5* 7.3*   HEMATOCRIT % 24.0* 23.3*   PLATELETS 10*3/mm3 79*  --      Results from last 7 days   Lab Units 12/11/22  0358 " "  SODIUM mmol/L 141   POTASSIUM mmol/L 4.2   CHLORIDE mmol/L 109*   CO2 mmol/L 27.0   BUN mg/dL 16   CREATININE mg/dL 0.61*   GLUCOSE mg/dL 67   CALCIUM mg/dL 8.2*     Results from last 7 days   Lab Units 12/11/22  0358   ALK PHOS U/L 271*   BILIRUBIN mg/dL 0.5   ALT (SGPT) U/L 41   AST (SGOT) U/L 72*               Estimated Creatinine Clearance: 150.1 mL/min (A) (by C-G formula based on SCr of 0.61 mg/dL (L)).      Microbiology:      Radiology:  Imaging Results (Last 72 Hours)     ** No results found for the last 72 hours. **            Impression:       --Acute/recurrent/persistent MRSA bacteremia/septicemia; prior history positive cultures May 2022 and recurrent despite prior  IV vancomycin and left foot surgery in addition to other supportive measures.   TTE with ? TV echodensity prior admit in September and TYRESE no vegetation at that time at [facility];   blood culture positivity again at [facility] in October/November as above.  Daptomycin STU increased to 3 on 11/20 isolate and not sensitive to daptomycin per microbiology.  Discussed on several occasions with Dr. Giordano; had a repeat TYRESE on October 31 with mention of \" moderate size echodense structure above the posterior tricuspid leaflet but not attached to the leaflet\" per cardiology there, described potentially as a \"normal variant\" per their note.  High risk for further serious morbidity and other serious sequela including persistent/progressive or recurrent infection, metastatic foci of involvement and other dire consequences;  vancomycin sensitivity maintained albeit with STU =2; adjusted to vancomycin/ceftaroline in light of daptomycin resistance and I asked microbiology     **Blood cultures November 23 negative so far    **repeat TYRESE 12/1 ; no definitive vegetation per cardiology and only mild tricuspid valve regurgitation stable from prior exam per them.  Ill-defined area in the right atrium appeared stable to them. D/w Dr Trent; you still may " need to consider PET scan and likely to require repeat echocardiography during his course.  I have discussed with Dr. Giordano and he will arrange     --Acute right knee pain/septic arthritis with surgical intervention, incision/debridement by Dr. Bentley on September 21.   MRSA+ in the setting of MRSA bacteremia at that time;  Repeat surgery 11/30, d/w Dr Bentley. Cultures November 30 negative     --History of cirrhosis by records.  Unclear etiology.   further GI referral/eval per  medicine team     --Diabetes.  You need to tightly control blood sugar to give best chance for healing.;  Described as uncontrolled by medicine team at admission     -- Chronic lumbar back pain for decades.  This is not new, not worse and no new location.  No new exacerbation or new neurologic symptomatology in his extremities.  MRI 12/3 suboptimal per radiology;  may require further imaging depending on his clinical course to help exclude any other sequestered/spinal-paraspinal focus depending on clinical course     -- cytopenias/TCP ; monitor     PLAN:       -- IV vancomycin/ceftaroline potentially 6 weeks but to depend on clinical course/study results and response to therapy.  High risk for occult endovascular focus with multiple past positive blood cultures, most recently November 20 and 22; after IV antibiotics, depending on clinical course, may require chronic oral suppression step down (likely to be decided by Dr. Giordano )     -- Check/review labs cultures and scans     -- Partial history per nursing staff     --d/w pharmacy       -- Highly complex set of issues with high risk for further serious morbidity and other serious sequela     -- Discussed with microbiology; they are doing further assessment to blood cultures drawn November 22     --d/w Dr Giordano and Dr BARBRA Giodrano to help with disposition       --  new PICC line,   No specific new pain or other suppurative changes at the surface at either site.  I discussed with Dr. Trent  regarding additional diagnostics for endocarditis such as PET scan; this still may need to be considered as outpatient depending on clinical course.  see above.    --monitor sell counts     **MRI's noted without specific focus at Lumbar spine or foot although spinal imaging suboptimal per radiology; I d/w Dr Giordano with plans for transition back to [facility] there; depending on clinical course/symptomatology, Dr. Giordano will consider white blood cell scan versus repeat imaging to evaluate for potential occult focus.    ** 12/9 d/w Dr SHEEHAN;  He came from [facility] in [place] per patient; he had been given teflaro there previously per Dr Giordano; case management continues to consider options.  May require white blood cell scan    **Copied text in this note has been reviewed and is accurate as of 12/11/22.        Bryce Euceda MD  12/11/2022

## 2022-12-11 NOTE — PLAN OF CARE
Goal Outcome Evaluation:  Plan of Care Reviewed With: patient        Progress: improving  Outcome Evaluation: Pt pleasant and agreeable to therapy. Amb 115' with CGA and FWW, minor c/o R distal quad pain with activity but overall tolerated session well. Will continue to progress as able. PT rec IRF upon d/c.

## 2022-12-11 NOTE — THERAPY TREATMENT NOTE
Patient Name: Melchor Hardwick III  : 1965    MRN: [id number]                              Today's Date: 2022       Admit Date: 2022    Visit Dx:     ICD-10-CM ICD-9-CM   1. Infection of right knee (HCC)  M00.9 686.9     Patient Active Problem List   Diagnosis   • Hyperlipidemia   • Hypertensive disorder   • Obesity   • Type 2 diabetes mellitus with hyperglycemia, with long-term current use of insulin (HCC)   • Gas gangrene of foot (HCC)   • Cellulitis in diabetic foot (HCC)   • Other acute osteomyelitis of left foot (HCC)   • Osteomyelitis (HCC)   • Critical illness myopathy   • Staphylococcal arthritis of right knee (HCC)   • Other cirrhosis of liver (HCC)   • MRSA bacteremia   • Endocarditis of tricuspid valve   • Wound of right buttock   • History of osteomyelitis   • Debility   • Infection of right knee (HCC)   • S/P right knee implant removal with insertion of antibiotic spacer   • Hypothyroid   • MRSA sepsis   • Chronic low back pain   • PICC (peripherally inserted central catheter) in place     Past Medical History:   Diagnosis Date   • Diabetes mellitus (HCC)     4 times per day gets checked for sugar at rehab   • Dizzy    • Hyperlipidemia    • Hypertension    • Hypothyroid 2022   • MRSA infection     blood -      • Orthostatic hypotension    • Pressure sore on buttocks     top crack -  sees wound - but now rn take care of it at rehab - minor opening - dime size - pat nurse didnt assess-  pt states getting better   • Pyogenic arthritis of right knee joint, due to unspecified organism (HCC) 2022   • Sepsis (HCC)    • Wears glasses     readers     Past Surgical History:   Procedure Laterality Date   • ABSCESS DRAINAGE  2004    PERINEUM   • AMPUTATION FOOT Left 2022    Procedure: AMPUTATION FOOT;  Surgeon: Addison Colby MD;  Location: [facility];  Service: Podiatry;  Laterality: Left;   • INCISION AND DRAINAGE LEG Left 05/10/2022    Procedure: INCISION AND DRAINAGE  LOWER EXTREMITY;  Surgeon: Addison Colby MD;  Location:  [facility];  Service: Podiatry;  Laterality: Left;   • KNEE INCISION AND DRAINAGE Right 09/21/2022    Procedure: KNEE INCISION AND DRAINAGE RIGHT;  Surgeon: Brain Bentley MD;  Location:  [facility];  Service: Orthopedics;  Laterality: Right;   • PERIPHERALLY INSERTED CENTRAL CATHETER INSERTION Left    • TOTAL KNEE  PROSTHESIS REMOVAL W/ SPACER INSERTION Right 11/30/2022    Procedure: ANTIBIOTIC SPACER PLACEMENT KNEE - RIGHT;  Surgeon: Brain Bentley MD;  Location:  [facility];  Service: Orthopedics;  Laterality: Right;   • WOUND DEBRIDEMENT Left 05/13/2022    Procedure: DEBRIDEMENT FOOT;  Surgeon: Addison Colby MD;  Location:  [facility];  Service: Podiatry;  Laterality: Left;      General Information     Row Name 12/11/22 0955          Physical Therapy Time and Intention    Document Type therapy note (daily note)  -ES     Mode of Treatment physical therapy  -ES     Row Name 12/11/22 0955          General Information    Patient Profile Reviewed yes  -ES     Existing Precautions/Restrictions fall;other (see comments)  abdominal binder per orthostatic hypotension, L transmetatarsal amp w/ walking boot  -ES     Barriers to Rehab medically complex;previous functional deficit;physical barrier  -ES     Row Name 12/11/22 0955          Cognition    Orientation Status (Cognition) oriented x 4  -ES     Row Name 12/11/22 0955          Safety Issues, Functional Mobility    Safety Issues Affecting Function (Mobility) positioning of assistive device;safety precaution awareness;safety precautions follow-through/compliance;sequencing abilities  -ES     Impairments Affecting Function (Mobility) endurance/activity tolerance;range of motion (ROM);postural/trunk control;strength;balance;pain  -ES           User Key  (r) = Recorded By, (t) = Taken By, (c) = Cosigned By    Initials Name Provider Type    ES Avril Dominguez PT Physical Therapist               Mobility     Row  Name 12/11/22 0956          Bed Mobility    Bed Mobility supine-sit;scooting/bridging  -ES     Scooting/Bridging [place] (Bed Mobility) modified independence  -ES     Supine-Sit [place] (Bed Mobility) modified independence  -ES     Assistive Device (Bed Mobility) head of bed elevated;bed rails  -ES     Row Name 12/11/22 0956          Sit-Stand Transfer    Sit-Stand [place] (Transfers) contact guard  -ES     Assistive Device (Sit-Stand Transfers) walker, front-wheeled  -ES     Row Name 12/11/22 0956          Gait/Stairs (Locomotion)    [place] Level (Gait) verbal cues;contact guard;1 person assist  -ES     Assistive Device (Gait) walker, front-wheeled  -ES     Distance in Feet (Gait) 115  -ES     Deviations/Abnormal Patterns (Gait) stride length decreased;weight shifting decreased;yao decreased;gait speed decreased;bilateral deviations  -ES     Bilateral Gait Deviations forward flexed posture;heel strike decreased;decreased arm swing  -ES     Comment, (Gait/Stairs) Pt amb 115' with CGA and FWW. Demo'd forward flexed posture and decreased stride length but no LOB/knee buckling. Limited primarily by fatigue and c/o pain at distal R quad.  -ES     Row Name 12/11/22 0956          Mobility    Extremity Weight-bearing Status left lower extremity;right lower extremity  -ES     Left Lower Extremity (Weight-bearing Status) weight-bearing as tolerated (WBAT)  -ES     Right Lower Extremity (Weight-bearing Status) weight-bearing as tolerated (WBAT)  -ES           User Key  (r) = Recorded By, (t) = Taken By, (c) = Cosigned By    Initials Name Provider Type    ES Avril Dominguez PT Physical Therapist               Obj/Interventions     Row Name 12/11/22 0958          Balance    Balance Assessment sitting static balance;sitting dynamic balance;sit to stand dynamic balance;standing dynamic balance  -ES     Static Sitting Balance modified independence  -ES     Dynamic Sitting Balance modified independence   -ES     Position, Sitting Balance sitting edge of bed;unsupported;supported;sitting in chair  -ES     Dynamic Standing Balance contact guard;verbal cues  -ES     Position/Device Used, Standing Balance supported;walker, front-wheeled  -ES     Balance Interventions sitting;standing;sit to stand;supported;static;dynamic  -ES     Comment, Balance No LOB.  -ES           User Key  (r) = Recorded By, (t) = Taken By, (c) = Cosigned By    Initials Name Provider Type    ES Avril Dominguez, PT Physical Therapist               Goals/Plan    No documentation.                Clinical Impression     Row Name 12/11/22 0959          Pain    Pain Intervention(s) Repositioned;Ambulation/increased activity;Elevated  -ES     Row Name 12/11/22 0959          Pain Scale: FACES Pre/Post-Treatment    Pain: FACES Scale, Pretreatment 2-->hurts little bit  -ES     Posttreatment Pain Rating 2-->hurts little bit  -ES     Pain Location - Side/Orientation Right  -ES     Pain Location proximal  -ES     Pain Location - knee  -ES     Pre/Posttreatment Pain Comment tolerated  -ES     Row Name 12/11/22 0959          Plan of Care Review    Plan of Care Reviewed With patient  -ES     Progress improving  -ES     Outcome Evaluation Pt pleasant and agreeable to therapy. Amb 115' with CGA and FWW, minor c/o R distal quad pain with activity but overall tolerated session well. Will continue to progress as able. PT rec IRF upon d/c.  -ES     Row Name 12/11/22 0959          Therapy Assessment/Plan (PT)    Rehab Potential (PT) good, to achieve stated therapy goals  -ES     Criteria for Skilled Interventions Met (PT) yes;meets criteria;skilled treatment is necessary  -ES     Therapy Frequency (PT) daily  -ES     Row Name 12/11/22 0959          Vital Signs    Pre Systolic BP Rehab --  VSS. cleared by RN.  -ES     O2 Delivery Pre Treatment room air  -ES     O2 Delivery Intra Treatment room air  -ES     O2 Delivery Post Treatment room air  -ES     Pre Patient  Position Supine  -ES     Intra Patient Position Standing  -ES     Post Patient Position Sitting  -ES     Row Name 12/11/22 0959          Positioning and Restraints    Pre-Treatment Position in bed  -ES     Post Treatment Position chair  -ES     In Chair notified nsg;reclined;sitting;call light within reach;encouraged to call for assist;exit alarm on;legs elevated;waffle cushion;on mechanical lift sling  -ES           User Key  (r) = Recorded By, (t) = Taken By, (c) = Cosigned By    Initials Name Provider Type    Avril Davidson PT Physical Therapist               Outcome Measures     Row Name 12/11/22 1002          How much help from another person do you currently need...    Turning from your back to your side while in flat bed without using bedrails? 4  -ES     Moving from lying on back to sitting on the side of a flat bed without bedrails? 4  -ES     Moving to and from a bed to a chair (including a wheelchair)? 3  -ES     Standing up from a chair using your arms (e.g., wheelchair, bedside chair)? 3  -ES     Climbing 3-5 steps with a railing? 3  -ES     To walk in hospital room? 3  -ES     AM-PAC 6 Clicks Score (PT) 20  -ES     Highest level of mobility 6 --> Walked 10 steps or more  -ES     Row Name 12/11/22 1002          Functional Assessment    Outcome Measure Options AM-PAC 6 Clicks Basic Mobility (PT)  -ES           User Key  (r) = Recorded By, (t) = Taken By, (c) = Cosigned By    Initials Name Provider Type    Avril Davidson PT Physical Therapist                             Physical Therapy Education     Title: PT OT SLP Therapies (In Progress)     Topic: Physical Therapy (In Progress)     Point: Mobility training (In Progress)     Learning Progress Summary           Patient Acceptance, E, NR by AS at 12/10/2022 0943    YANELY Junior,HALEY, FLORECITA,RAH,NR by  at 12/9/2022 1620    Comment: Reviewed safety/technique with transfers, ambulation, HEP, PT POC    YANELY Junior VU by SC at 12/8/2022 1418    Comment:  reviewed benefits of activity    Eager, E, VU,NR by SC at 12/7/2022 1156    Comment: reviewed ROM exercise    Eager, E, VU,DU,NR by  at 12/6/2022 1637    Comment: Reviewed safety/technique with transfers, ambulation, HEP, PT POC    Eager, E, VU,DU,NR by  at 12/5/2022 1428    Comment: Reviewed safety/technique with bed mobility, transfers, ambulation, HEP, PT POC    Acceptance, E,TB, VU by  at 12/4/2022 1003    Acceptance, E,TB, VU by  at 12/3/2022 1137    Acceptance, E, NR by AS at 12/2/2022 1051    Eager, E, VU,DU,NR by  at 12/1/2022 0946    Comment: Educated pt. safety/technique with bed mobility, transfers, ambulation, WB status, use of knee immobilizer, PT POC                   Point: Home exercise program (In Progress)     Learning Progress Summary           Patient Acceptance, E, NR by AS at 12/10/2022 0943    Eager, E,H, VU,DU,NR by  at 12/9/2022 1620    Comment: Reviewed safety/technique with transfers, ambulation, HEP, PT POC    Eager, E, VU by SC at 12/8/2022 1418    Comment: reviewed benefits of activity    Eager, E, VU,NR by SC at 12/7/2022 1156    Comment: reviewed ROM exercise    Eager, E, VU,DU,NR by  at 12/6/2022 1637    Comment: Reviewed safety/technique with transfers, ambulation, HEP, PT POC    Eager, E, VU,DU,NR by  at 12/5/2022 1428    Comment: Reviewed safety/technique with bed mobility, transfers, ambulation, HEP, PT POC    Acceptance, E,TB, VU by  at 12/4/2022 1003    Acceptance, E,TB, VU by  at 12/3/2022 1137    Acceptance, E, NR by AS at 12/2/2022 1051    Eager, E, VU,DU,NR by  at 12/1/2022 0946    Comment: Educated pt. safety/technique with bed mobility, transfers, ambulation, WB status, use of knee immobilizer, PT POC                   Point: Body mechanics (In Progress)     Learning Progress Summary           Patient Acceptance, E, NR by AS at 12/10/2022 0943    YANELY JuniorH, FLORECITA,RAH,NR by SS at 12/9/2022 1620    Comment: Reviewed safety/technique with transfers,  ambulation, HEP, PT POC    Eager, E, VU by SC at 12/8/2022 1418    Comment: reviewed benefits of activity    Eager, E, VU,NR by SC at 12/7/2022 1156    Comment: reviewed ROM exercise    Eager, E, VU,DU,NR by  at 12/6/2022 1637    Comment: Reviewed safety/technique with transfers, ambulation, HEP, PT POC    Eager, E, VU,DU,NR by  at 12/5/2022 1428    Comment: Reviewed safety/technique with bed mobility, transfers, ambulation, HEP, PT POC    Acceptance, E,TB, VU by  at 12/4/2022 1003    Acceptance, E,TB, VU by  at 12/3/2022 1137    Acceptance, E, NR by AS at 12/2/2022 1051    Eager, E, VU,DU,NR by  at 12/1/2022 0946    Comment: Educated pt. safety/technique with bed mobility, transfers, ambulation, WB status, use of knee immobilizer, PT POC                   Point: Precautions (In Progress)     Learning Progress Summary           Patient Acceptance, E, NR by AS at 12/10/2022 0943    Eager, E,H, VU,DU,NR by  at 12/9/2022 1620    Comment: Reviewed safety/technique with transfers, ambulation, HEP, PT POC    Eager, E, VU by SC at 12/8/2022 1418    Comment: reviewed benefits of activity    Eager, E, VU,NR by SC at 12/7/2022 1156    Comment: reviewed ROM exercise    Eager, E, VU,DU,NR by  at 12/6/2022 1637    Comment: Reviewed safety/technique with transfers, ambulation, HEP, PT POC    Eager, E, VU,DU,NR by  at 12/5/2022 1428    Comment: Reviewed safety/technique with bed mobility, transfers, ambulation, HEP, PT POC    Acceptance, E,TB, VU by  at 12/4/2022 1003    Acceptance, E,TB, VU by  at 12/3/2022 1137    Acceptance, E, NR by AS at 12/2/2022 1051    Eager, E, VU,DU,NR by  at 12/1/2022 0946    Comment: Educated pt. safety/technique with bed mobility, transfers, ambulation, WB status, use of knee immobilizer, PT POC                               User Key     Initials Effective Dates Name Provider Type Discipline    SC 06/16/21 -  Orion Reyes PT Physical Therapist PT    AS 06/16/21 -  Brenda  Ana Cristina Conway, PTA Physical Therapist Assistant PT     09/22/20 -  Ehsan De La Cruz, JANNETH Physical Therapist PT    SS 06/01/21 -  Susan Quesada PT Physical Therapist PT              PT Recommendation and Plan     Plan of Care Reviewed With: patient  Progress: improving  Outcome Evaluation: Pt pleasant and agreeable to therapy. Amb 115' with CGA and FWW, minor c/o R distal quad pain with activity but overall tolerated session well. Will continue to progress as able. PT rec IRF upon d/c.     Time Calculation:    PT Charges     Row Name 12/11/22 1005             Time Calculation    Start Time 0926  -ES      PT Received On 12/11/22  -ES      PT Goal Re-Cert Due Date 12/14/22  -ES         Time Calculation- PT    Total Timed Code Minutes- PT 23 minute(s)  -ES         Timed Charges    20833 - Gait Training Minutes  15  -ES      97928 - PT Therapeutic Activity Minutes 8  -ES         Total Minutes    Timed Charges Total Minutes 23  -ES       Total Minutes 23  -ES            User Key  (r) = Recorded By, (t) = Taken By, (c) = Cosigned By    Initials Name Provider Type    ES Avril Dominguez, PT Physical Therapist              Therapy Charges for Today     Code Description Service Date Service Provider Modifiers Qty    67904364544 HC GAIT TRAINING EA 15 MIN 12/11/2022 Avril Dominguez, PT GP 1    78214386110 HC PT THERAPEUTIC ACT EA 15 MIN 12/11/2022 Avril Dominguez, PT GP 1          PT G-Codes  Outcome Measure Options: AM-PAC 6 Clicks Basic Mobility (PT)  AM-PAC 6 Clicks Score (PT): 20  AM-PAC 6 Clicks Score (OT): 16  PT Discharge Summary  Anticipated Discharge Disposition (PT): inpatient rehabilitation facility    Avril Dominguez PT  12/11/2022

## 2022-12-11 NOTE — PLAN OF CARE
Goal Outcome Evaluation:  Plan of Care Reviewed With: patient        Progress: no change  Outcome Evaluation: Up to chair and tolerates well. Glucoses 81, 143. Optifoam dressing CDI to right knee. HAYDEN PICC CDI. IV antibiotics continue. Waiting placement and discharge plan.

## 2022-12-11 NOTE — PROGRESS NOTES
"IM progress note      Melchor Hardwick III  [id number]  1965     LOS: 11 days     Attending: Brain Bentley MD    Primary Care Provider: Missy Up APRN      Chief Complaint/Reason for visit:  No chief complaint on file.      Subjective   Doing well. Denies pain. Feels like he is \"retaining fluid in my belly\". Denies f/c/n/v/sob/cp.    Objective        Visit Vitals  /59 (BP Location: Left arm, Patient Position: Lying)   Pulse 70   Temp 98.2 °F (36.8 °C) (Oral)   Resp 18   Ht 177.8 cm (70\")   Wt 88.9 kg (196 lb)   SpO2 96%   BMI 28.12 kg/m²     Temp (24hrs), Av.1 °F (36.7 °C), Min:98 °F (36.7 °C), Max:98.2 °F (36.8 °C)      Intake/Output:    Intake/Output Summary (Last 24 hours) at 2022 1134  Last data filed at 2022 0856  Gross per 24 hour   Intake --   Output 3000 ml   Net -3000 ml         Physical Exam:     General Appearance:    Alert, cooperative, in no acute distress   Head:    Normocephalic, without obvious abnormality, atraumatic    Lungs:     Normal effort, symmetric chest rise,  clear to      auscultation bilaterally              Heart:    Regular rhythm and normal rate, normal S1 and S2    Abdomen:     Normal bowel sounds, mild distention.  Abdominal binder in place   Extremities: CDI optifoam over knee incision.   Flexion and dorsiflexion intact bilateral feet.   Pulses:   Pulses palpable and equal bilaterally   Skin:   No bleeding, bruising or rash          Results Review:     I reviewed the patient's new clinical results.   Results from last 7 days   Lab Units 22  0358 22  0534   WBC 10*3/mm3 3.39*  --    HEMOGLOBIN g/dL 7.5* 7.3*   HEMATOCRIT % 24.0* 23.3*   PLATELETS 10*3/mm3 79*  --      Results from last 7 days   Lab Units 22  0358 12/10/22  1307 22  0432   SODIUM mmol/L 141 141 139   POTASSIUM mmol/L 4.2 4.7 4.3   CHLORIDE mmol/L 109* 107 105   CO2 mmol/L 27.0 25.0 27.0   BUN mg/dL 16 16 13   CREATININE mg/dL 0.61* 0.77 0.70*   CALCIUM mg/dL " 8.2* 7.9* 7.7*   BILIRUBIN mg/dL 0.5  --   --    ALK PHOS U/L 271*  --   --    ALT (SGPT) U/L 41  --   --    AST (SGOT) U/L 72*  --   --    GLUCOSE mg/dL 67 94 151*   TYRESE   •  Indication for TYRESE -to rule out infective endocarditis in a patient with MRSA bacteremia.  •  Findings-  •  Left ventricular systolic function is normal. Estimated left ventricular EF = 60%  •  Left atrial appendage is well visualized and is free of thrombus.  •  Saline test was negative for the evidence of shunt in interatrial septum.  •  The tricuspid valve appeared normal in structure. There was a moderate sized echodense structure seen above  the posterior tricuspid leaflet but not attached to the leaflet. The appearance and location are not typical for regurgitation. This structure could be a normal variant. No evidence of tricuspid valve stenosis or significant regurgitations.  •  Other valves also appear normal in structure with no evidence of stenosis or significant regurgitations.  No vegetations seen on these valves.  •  There is no evidence of pericardial effusion.  •  Comment-  •  In view of presence of MRSA bacteremia, recommend to extend antibiotic therapy for possible infective endocarditis and repeat TYRESE in 3 months.            Latest Reference Range & Units 12/11/22 03:58 12/11/22 07:45 12/11/22 10:57   Glucose 70 - 130 mg/dL 67 81 143 (H)   (H): Data is abnormally high    All medications reviewed.   acetaminophen, 1,000 mg, Oral, Q8H  aspirin, 81 mg, Oral, Q12H  ceftaroline, 600 mg, Intravenous, Q12H  insulin detemir, 20 Units, Subcutaneous, Q12H  insulin lispro, 0-7 Units, Subcutaneous, TID AC  Insulin Lispro, 7 Units, Subcutaneous, TID With Meals  levothyroxine, 25 mcg, Oral, Q AM  meloxicam, 15 mg, Oral, Daily  midodrine, 2.5 mg, Oral, TID AC  pantoprazole, 40 mg, Oral, Q AM  polyethylene glycol, 17 g, Oral, Daily  pregabalin, 150 mg, Oral, Q12H  sodium chloride, 10 mL, Intravenous, Q12H  vancomycin, 1,250 mg, Intravenous,  Q12H      cyclobenzaprine, 5 mg, TID PRN  dextrose, 25 g, Q15 Min PRN  dextrose, 15 g, Q15 Min PRN  diphenhydrAMINE, 25 mg, Q6H PRN   Or  diphenhydrAMINE, 25 mg, Q6H PRN  glucagon (human recombinant), 1 mg, Q15 Min PRN  labetalol, 10 mg, Q4H PRN  naloxone, 0.1 mg, Q5 Min PRN  ondansetron, 4 mg, Q6H PRN   Or  ondansetron, 4 mg, Q6H PRN  Pharmacy to dose vancomycin, , Continuous PRN  sodium chloride, 500 mL, TID PRN  sodium chloride, 10 mL, PRN        Assessment & Plan       S/P right knee implant removal with insertion of antibiotic spacer    Infection of right knee (HCC)    Type 2 diabetes mellitus with hyperglycemia, with long-term current use of insulin (HCC)    Hypothyroid    MRSA sepsis    Chronic low back pain    PICC (peripherally inserted central catheter) in place      Plan  1. PT/OT, weightbearing per protocol.  2. Pain control-prns   3. IS-encouraged  4. DVT proph-mechanicals, aspirin.  5. Bowel regimen  6. Monitor post-op labs  7. DC planning,ongoing.  following     Lasix po x1 today 12/11/22    Hypothyroid  -Continue home Synthroid     DM  - hgb A1c on 11/28/2022 7.3  -Continue home bolus insulin with meals and long-acting insulin twice daily/ doses increased 12/1.   - Accu-Chek AC and HS with low dose SSI    TYRESE, done, noted, no obvious vegetation.    No infectious focus evident on MRIs.    Antibiotics per ID, Dr. Euceda is following   Plan on tagged WBC scan       Tiffanie Cornell, ROSALINA  12/11/22  11:34 EST

## 2022-12-11 NOTE — PLAN OF CARE
Goal Outcome Evaluation:   Patient has been resting comfortably with no acute signs of distress. VSS. AO x4. No complaints of pain overnight. Continuing to await placement. Will continue to monitor as patient progresses in their care.

## 2022-12-12 LAB
ALBUMIN SERPL-MCNC: 2.2 G/DL (ref 3.5–5.2)
ALBUMIN/GLOB SERPL: 0.8 G/DL
ALP SERPL-CCNC: 285 U/L (ref 39–117)
ALT SERPL W P-5'-P-CCNC: 36 U/L (ref 1–41)
ANION GAP SERPL CALCULATED.3IONS-SCNC: 4 MMOL/L (ref 5–15)
AST SERPL-CCNC: 58 U/L (ref 1–40)
BILIRUB SERPL-MCNC: 0.4 MG/DL (ref 0–1.2)
BUN SERPL-MCNC: 16 MG/DL (ref 6–20)
BUN/CREAT SERPL: 27.1 (ref 7–25)
CALCIUM SPEC-SCNC: 7.8 MG/DL (ref 8.6–10.5)
CHLORIDE SERPL-SCNC: 108 MMOL/L (ref 98–107)
CO2 SERPL-SCNC: 26 MMOL/L (ref 22–29)
CREAT SERPL-MCNC: 0.59 MG/DL (ref 0.76–1.27)
DEPRECATED RDW RBC AUTO: 52.7 FL (ref 37–54)
EGFRCR SERPLBLD CKD-EPI 2021: 113.2 ML/MIN/1.73
ERYTHROCYTE [DISTWIDTH] IN BLOOD BY AUTOMATED COUNT: 17.8 % (ref 12.3–15.4)
GLOBULIN UR ELPH-MCNC: 2.7 GM/DL
GLUCOSE BLDC GLUCOMTR-MCNC: 123 MG/DL (ref 70–130)
GLUCOSE BLDC GLUCOMTR-MCNC: 132 MG/DL (ref 70–130)
GLUCOSE BLDC GLUCOMTR-MCNC: 152 MG/DL (ref 70–130)
GLUCOSE BLDC GLUCOMTR-MCNC: 251 MG/DL (ref 70–130)
GLUCOSE SERPL-MCNC: 174 MG/DL (ref 65–99)
HCT VFR BLD AUTO: 24 % (ref 37.5–51)
HGB BLD-MCNC: 7.4 G/DL (ref 13–17.7)
MCH RBC QN AUTO: 25.5 PG (ref 26.6–33)
MCHC RBC AUTO-ENTMCNC: 30.8 G/DL (ref 31.5–35.7)
MCV RBC AUTO: 82.8 FL (ref 79–97)
PLATELET # BLD AUTO: 85 10*3/MM3 (ref 140–450)
PMV BLD AUTO: 10.8 FL (ref 6–12)
POTASSIUM SERPL-SCNC: 4.3 MMOL/L (ref 3.5–5.2)
PROT SERPL-MCNC: 4.9 G/DL (ref 6–8.5)
RBC # BLD AUTO: 2.9 10*6/MM3 (ref 4.14–5.8)
SODIUM SERPL-SCNC: 138 MMOL/L (ref 136–145)
WBC NRBC COR # BLD: 3.07 10*3/MM3 (ref 3.4–10.8)

## 2022-12-12 PROCEDURE — 82962 GLUCOSE BLOOD TEST: CPT

## 2022-12-12 PROCEDURE — 63710000001 INSULIN LISPRO (HUMAN) PER 5 UNITS: Performed by: INTERNAL MEDICINE

## 2022-12-12 PROCEDURE — 80053 COMPREHEN METABOLIC PANEL: CPT | Performed by: INTERNAL MEDICINE

## 2022-12-12 PROCEDURE — 25010000002 VANCOMYCIN 10 G RECONSTITUTED SOLUTION

## 2022-12-12 PROCEDURE — 25010000002 CEFTAROLINE FOSAMIL PER 10 MG: Performed by: INTERNAL MEDICINE

## 2022-12-12 PROCEDURE — 63710000001 INSULIN LISPRO (HUMAN) PER 5 UNITS: Performed by: NURSE PRACTITIONER

## 2022-12-12 PROCEDURE — 97110 THERAPEUTIC EXERCISES: CPT

## 2022-12-12 PROCEDURE — 85027 COMPLETE CBC AUTOMATED: CPT | Performed by: INTERNAL MEDICINE

## 2022-12-12 PROCEDURE — 97116 GAIT TRAINING THERAPY: CPT

## 2022-12-12 PROCEDURE — 63710000001 INSULIN DETEMIR PER 5 UNITS: Performed by: INTERNAL MEDICINE

## 2022-12-12 RX ADMIN — PREGABALIN 150 MG: 150 CAPSULE ORAL at 21:21

## 2022-12-12 RX ADMIN — INSULIN LISPRO 7 UNITS: 100 INJECTION, SOLUTION INTRAVENOUS; SUBCUTANEOUS at 11:49

## 2022-12-12 RX ADMIN — Medication 10 ML: at 09:15

## 2022-12-12 RX ADMIN — ACETAMINOPHEN 1000 MG: 500 TABLET, FILM COATED ORAL at 05:59

## 2022-12-12 RX ADMIN — INSULIN LISPRO 2 UNITS: 100 INJECTION, SOLUTION INTRAVENOUS; SUBCUTANEOUS at 17:29

## 2022-12-12 RX ADMIN — Medication 10 ML: at 21:26

## 2022-12-12 RX ADMIN — ASPIRIN 81 MG: 81 TABLET, COATED ORAL at 21:21

## 2022-12-12 RX ADMIN — VANCOMYCIN HYDROCHLORIDE 1250 MG: 10 INJECTION, POWDER, LYOPHILIZED, FOR SOLUTION INTRAVENOUS at 11:49

## 2022-12-12 RX ADMIN — INSULIN DETEMIR 20 UNITS: 100 INJECTION, SOLUTION SUBCUTANEOUS at 21:21

## 2022-12-12 RX ADMIN — LEVOTHYROXINE SODIUM 25 MCG: 25 TABLET ORAL at 05:59

## 2022-12-12 RX ADMIN — INSULIN LISPRO 7 UNITS: 100 INJECTION, SOLUTION INTRAVENOUS; SUBCUTANEOUS at 09:10

## 2022-12-12 RX ADMIN — MIDODRINE HYDROCHLORIDE 2.5 MG: 5 TABLET ORAL at 17:29

## 2022-12-12 RX ADMIN — MELOXICAM 15 MG: 15 TABLET ORAL at 09:10

## 2022-12-12 RX ADMIN — CEFTAROLINE FOSAMIL 600 MG: 600 POWDER, FOR SOLUTION INTRAVENOUS at 21:26

## 2022-12-12 RX ADMIN — MIDODRINE HYDROCHLORIDE 2.5 MG: 5 TABLET ORAL at 11:48

## 2022-12-12 RX ADMIN — ACETAMINOPHEN 1000 MG: 500 TABLET, FILM COATED ORAL at 21:21

## 2022-12-12 RX ADMIN — CEFTAROLINE FOSAMIL 600 MG: 600 POWDER, FOR SOLUTION INTRAVENOUS at 09:06

## 2022-12-12 RX ADMIN — VANCOMYCIN HYDROCHLORIDE 1250 MG: 10 INJECTION, POWDER, LYOPHILIZED, FOR SOLUTION INTRAVENOUS at 01:02

## 2022-12-12 RX ADMIN — ASPIRIN 81 MG: 81 TABLET, COATED ORAL at 09:10

## 2022-12-12 RX ADMIN — PREGABALIN 150 MG: 150 CAPSULE ORAL at 09:17

## 2022-12-12 RX ADMIN — ACETAMINOPHEN 1000 MG: 500 TABLET, FILM COATED ORAL at 13:23

## 2022-12-12 RX ADMIN — INSULIN DETEMIR 20 UNITS: 100 INJECTION, SOLUTION SUBCUTANEOUS at 09:10

## 2022-12-12 RX ADMIN — INSULIN LISPRO 7 UNITS: 100 INJECTION, SOLUTION INTRAVENOUS; SUBCUTANEOUS at 17:31

## 2022-12-12 RX ADMIN — POLYETHYLENE GLYCOL 3350 17 G: 17 POWDER, FOR SOLUTION ORAL at 09:09

## 2022-12-12 RX ADMIN — MIDODRINE HYDROCHLORIDE 2.5 MG: 5 TABLET ORAL at 09:10

## 2022-12-12 RX ADMIN — PANTOPRAZOLE SODIUM 40 MG: 40 TABLET, DELAYED RELEASE ORAL at 05:59

## 2022-12-12 NOTE — PLAN OF CARE
Goal Outcome Evaluation:  Plan of Care Reviewed With: patient        Progress: no change  Outcome Evaluation: Pt declined to sit in the chair today. Glucoses 123, 132, and 152. HAYDEN PICC is intact. Antibiotics given as ordered. Vanc trough due 12/12/22. Optifoam dressing to operative site. waiting a discharge plan.

## 2022-12-12 NOTE — PLAN OF CARE
Goal Outcome Evaluation:  Plan of Care Reviewed With: patient        Progress: no change  Outcome Evaluation: Pt demonstrated good participation with therapy this date. Pt performed bed mobility Mod I. Pt amb 120' with CGA. No LOB or knee buckling noted. Pt encouraged to increase distance and frequency of ambulation. Tolerated HEP well. R knee ROM 10-60. Continue to recommend d/c to STR for best functional outcome.

## 2022-12-12 NOTE — PROGRESS NOTES
"Pharmacy Consult-Vancomycin Dosing  Melchor Hardwick III is a  57 y.o. male receiving vancomycin therapy.     Indication: Bacteremia, bone/joint infection, endocarditis  Consulting Provider: Dr Euceda  ID Consult: Yes    Goal AUC: 400 - 600 mg/L*hr    Current Antimicrobial Therapy  Vancomycin - pharmacy dosing  Ceftaroline 600mg IV q12h    Allergies  Allergies as of 11/23/2022    (No Known Allergies)       Labs    Results from last 7 days   Lab Units 12/12/22  0347 12/11/22 0358 12/10/22  1307   BUN mg/dL 16 16 16   CREATININE mg/dL 0.59* 0.61* 0.77       Results from last 7 days   Lab Units 12/12/22  0347 12/11/22  0358   WBC 10*3/mm3 3.07* 3.39*         Evaluation of Dosing     Last Dose Received in the ED/Outside Facility: 1250mg received as ppx on 11/30 @0902  Is Patient on Dialysis or Renal Replacement: no    Ht - 177.8 cm (70\")  Wt - 88.9 kg (196 lb)    Estimated Creatinine Clearance: 155.1 mL/min (A) (by C-G formula based on SCr of 0.59 mg/dL (L)).    Intake & Output (last 3 days)         12/09 0701  12/10 0700 12/10 0701  12/11 0700 12/11 0701  12/12 0700 12/12 0701  12/13 0700    P.O.  300    Total Intake(mL/kg) 275 (3.1) 245 (2.8) 1160 (13) 300 (3.4)    Urine (mL/kg/hr) 2450 (1.1) 2975 (1.4) 1375 (0.6)     Stool  0      Total Output 2450 2975 1375     Net -2175 -2730 -215 +300            Stool Unmeasured Occurrence  1 x              Microbiology and Radiology  Microbiology Results (last 10 days)       ** No results found for the last 240 hours. **            Reported Vancomycin Levels    Results from last 7 days   Lab Units 12/11/22  0358   VANCOMYCIN RM mcg/mL 35.20             [facility] AUC Calculation:    Current AUC:   566 mg/L*hr    Predicted Steady State AUC on Current Dose: 524 mg/L*hr  _________________________________    Predicted Steady State AUC on New Dose:   524 mg/L*hr    Assessment/Plan:    1. Pharmacy to dose vancomycin for bacteremia. Goal AUC: 400-600 mg/L*hr.   2. Patient " currently on a maintenance regimen of vancomycin 1250 mg IV q12h. (~14 mg/kg)  3. Vancomycin level on 12/11 was 35.2 mg/L, likely reflective of true peak after most recent dose. Results in therapeutic AUC. No evidence of nephrotoxicity at this point (Scr and UOP remain stable).   4. Patient has been fairly stable on this dose for ~10 days. Will plan for next level to be checked 12/14.   5. Monitor renal function, cultures and sensitivities, and clinical status, and adjust regimen as necessary.  Pharmacy will continue to follow.    No level reported back today, next level 12/14 at 0600. No clinical change based on labs and vitals. Continue the current regimen.     Erlinda Argueta, PharmD Candidate  12/12/22  14:09 EST

## 2022-12-12 NOTE — THERAPY TREATMENT NOTE
Patient Name: Melchor Hardwick III  : 1965    MRN: [id number]                              Today's Date: 2022       Admit Date: 2022    Visit Dx:     ICD-10-CM ICD-9-CM   1. Infection of right knee (HCC)  M00.9 686.9     Patient Active Problem List   Diagnosis   • Hyperlipidemia   • Hypertensive disorder   • Obesity   • Type 2 diabetes mellitus with hyperglycemia, with long-term current use of insulin (HCC)   • Gas gangrene of foot (HCC)   • Cellulitis in diabetic foot (HCC)   • Other acute osteomyelitis of left foot (HCC)   • Osteomyelitis (HCC)   • Critical illness myopathy   • Staphylococcal arthritis of right knee (HCC)   • Other cirrhosis of liver (HCC)   • MRSA bacteremia   • Endocarditis of tricuspid valve   • Wound of right buttock   • History of osteomyelitis   • Debility   • Infection of right knee (HCC)   • S/P right knee implant removal with insertion of antibiotic spacer   • Hypothyroid   • MRSA sepsis   • Chronic low back pain   • PICC (peripherally inserted central catheter) in place     Past Medical History:   Diagnosis Date   • Diabetes mellitus (HCC)     4 times per day gets checked for sugar at rehab   • Dizzy    • Hyperlipidemia    • Hypertension    • Hypothyroid 2022   • MRSA infection     blood -      • Orthostatic hypotension    • Pressure sore on buttocks     top crack -  sees wound - but now rn take care of it at rehab - minor opening - dime size - pat nurse didnt assess-  pt states getting better   • Pyogenic arthritis of right knee joint, due to unspecified organism (HCC) 2022   • Sepsis (HCC)    • Wears glasses     readers     Past Surgical History:   Procedure Laterality Date   • ABSCESS DRAINAGE  2004    PERINEUM   • AMPUTATION FOOT Left 2022    Procedure: AMPUTATION FOOT;  Surgeon: Addison Colby MD;  Location: [facility];  Service: Podiatry;  Laterality: Left;   • INCISION AND DRAINAGE LEG Left 05/10/2022    Procedure: INCISION AND DRAINAGE  LOWER EXTREMITY;  Surgeon: Addison Colby MD;  Location:  [facility];  Service: Podiatry;  Laterality: Left;   • KNEE INCISION AND DRAINAGE Right 09/21/2022    Procedure: KNEE INCISION AND DRAINAGE RIGHT;  Surgeon: Brain Bentley MD;  Location:  [facility];  Service: Orthopedics;  Laterality: Right;   • PERIPHERALLY INSERTED CENTRAL CATHETER INSERTION Left    • TOTAL KNEE  PROSTHESIS REMOVAL W/ SPACER INSERTION Right 11/30/2022    Procedure: ANTIBIOTIC SPACER PLACEMENT KNEE - RIGHT;  Surgeon: Brain Bentley MD;  Location:  [facility];  Service: Orthopedics;  Laterality: Right;   • WOUND DEBRIDEMENT Left 05/13/2022    Procedure: DEBRIDEMENT FOOT;  Surgeon: Addison Colby MD;  Location:  [facility];  Service: Podiatry;  Laterality: Left;      General Information     Row Name 12/12/22 0900          Physical Therapy Time and Intention    Document Type therapy note (daily note)  -     Mode of Treatment physical therapy  -     Row Name 12/12/22 0900          General Information    Patient Profile Reviewed yes  -     Existing Precautions/Restrictions fall;other (see comments)  abdominal binder per orthostatic hypotension, L transmetatarsal amp w/ walking boot  -     Row Name 12/12/22 0900          Cognition    Orientation Status (Cognition) oriented x 4  -     Row Name 12/12/22 0900          Safety Issues, Functional Mobility    Impairments Affecting Function (Mobility) endurance/activity tolerance;range of motion (ROM);postural/trunk control;strength;balance;pain  -           User Key  (r) = Recorded By, (t) = Taken By, (c) = Cosigned By    Initials Name Provider Type     Tonja Reyes PT Physical Therapist               Mobility     Row Name 12/12/22 0900          Bed Mobility    Bed Mobility supine-sit;scooting/bridging  -     Supine-Sit [place] (Bed Mobility) modified independence  -HP     Sit-Supine [place] (Bed Mobility) modified independence  -HP     Assistive Device (Bed Mobility) head  of bed elevated;bed rails  -     Comment, (Bed Mobility) assist of bed rails  -     Row Name 12/12/22 0900          Sit-Stand Transfer    Sit-Stand [place] (Transfers) modified independence  -     Assistive Device (Sit-Stand Transfers) walker, front-wheeled  -     Row Name 12/12/22 0900          Gait/Stairs (Locomotion)    [place] Level (Gait) verbal cues;contact guard;1 person assist  -     Assistive Device (Gait) walker, front-wheeled  -     Distance in Feet (Gait) 120  -     Deviations/Abnormal Patterns (Gait) stride length decreased;weight shifting decreased;yao decreased;gait speed decreased;bilateral deviations  -     Bilateral Gait Deviations forward flexed posture;heel strike decreased;decreased arm swing  -     Comment, (Gait/Stairs) Pt amb 120' with FWW and CGAx2. No knee buckling or LOB noted. Demonstrated forward flexed posture and L hip ER. Pt self limited mobility secondary to fatigue. Pt encouraged to increased distance and frequency as needed.  -     Row Name 12/12/22 0900          Mobility    Extremity Weight-bearing Status left lower extremity;right lower extremity  -     Left Lower Extremity (Weight-bearing Status) weight-bearing as tolerated (WBAT)  -     Right Lower Extremity (Weight-bearing Status) weight-bearing as tolerated (WBAT)  -           User Key  (r) = Recorded By, (t) = Taken By, (c) = Cosigned By    Initials Name Provider Type     Tonja Reyes PT Physical Therapist               Obj/Interventions     Row Name 12/12/22 0909          Range of Motion Comprehensive    Comment, General Range of Motion 10-60  -     Row Name 12/12/22 0909          Motor Skills    Therapeutic Exercise knee;ankle  -     Row Name 12/12/22 0909          Knee (Therapeutic Exercise)    Knee (Therapeutic Exercise) strengthening exercise  -     Knee Strengthening (Therapeutic Exercise) bilateral;SLR (straight leg raise);SAQ (short arc quad);LAQ (long arc quad);heel  slides;10 repetitions  -     Row Name 12/12/22 0909          Ankle (Therapeutic Exercise)    Ankle (Therapeutic Exercise) AROM (active range of motion)  -     Ankle AROM (Therapeutic Exercise) bilateral;dorsiflexion;plantarflexion;10 repetitions  -     Row Name 12/12/22 0909          Balance    Balance Assessment sitting static balance;sitting dynamic balance;sit to stand dynamic balance;standing static balance;standing dynamic balance  -     Dynamic Sitting Balance modified independence  -     Static Standing Balance standby assist  -     Dynamic Standing Balance contact guard  -     Position/Device Used, Standing Balance supported;walker, rolling  -     Balance Interventions standing;sit to stand;sitting;occupation based/functional task  -           User Key  (r) = Recorded By, (t) = Taken By, (c) = Cosigned By    Initials Name Provider Type     Tonja Reyes, PT Physical Therapist               Goals/Plan     Row Name 12/12/22 0916          Bed Mobility Goal 1 (PT)    Activity/Assistive Device (Bed Mobility Goal 1, PT) bed mobility activities, all  -HP     [place] Level/Cues Needed (Bed Mobility Goal 1, PT) modified independence  -HP     Time Frame (Bed Mobility Goal 1, PT) long term goal (LTG);10 days  -     Progress/Outcomes (Bed Mobility Goal 1, PT) goal met  -     Row Name 12/12/22 0916          Transfer Goal 1 (PT)    Activity/Assistive Device (Transfer Goal 1, PT) sit-to-stand/stand-to-sit;bed-to-chair/chair-to-bed  -HP     [place] Level/Cues Needed (Transfer Goal 1, PT) modified independence  -HP     Time Frame (Transfer Goal 1, PT) long term goal (LTG);10 days  -     Progress/Outcome (Transfer Goal 1, PT) good progress toward goal  -     Row Name 12/12/22 0916          Gait Training Goal 1 (PT)    Activity/Assistive Device (Gait Training Goal 1, PT) gait (walking locomotion);assistive device use;walker, rolling  -     [place] Level (Gait Training Goal 1, PT)  modified independence  -HP     Distance (Gait Training Goal 1, PT) 150  -HP     Time Frame (Gait Training Goal 1, PT) long term goal (LTG);10 days  -HP     Progress/Outcome (Gait Training Goal 1, PT) good progress toward goal  -HP     Row Name 12/12/22 0916          ROM Goal 1 (PT)    ROM Goal 1 (PT) R knee ROM 0-90  -HP     Time Frame (ROM Goal 1, PT) long-term goal (LTG);10 days  -HP     Progress/Outcome (ROM Goal 1, PT) goal ongoing  -HP           User Key  (r) = Recorded By, (t) = Taken By, (c) = Cosigned By    Initials Name Provider Type     Tonja Reyes PT Physical Therapist               Clinical Impression     Row Name 12/12/22 0910          Pain    Pretreatment Pain Rating 3/10  -HP     Posttreatment Pain Rating 3/10  -HP     Pain Location lower  -HP     Pain Location - back  -HP     Pain Intervention(s) Repositioned;Ambulation/increased activity  -     Row Name 12/12/22 0910          Plan of Care Review    Plan of Care Reviewed With patient  -HP     Progress no change  -HP     Outcome Evaluation Pt demonstrated good participation with therapy this date. Pt performed bed mobility Mod I. Pt amb 120' with CGA. No LOB or knee buckling noted. Pt encouraged to increase distance and frequency of ambulation. Tolerated HEP well. R knee ROM 10-60. Continue to recommend d/c to STR for best functional outcome.  -     Row Name 12/12/22 0910          Vital Signs    Pre Systolic BP Rehab --  VSS  -HP     Pre Patient Position Supine  -HP     Intra Patient Position Standing  -HP     Post Patient Position Supine  -     Row Name 12/12/22 0910          Positioning and Restraints    Pre-Treatment Position in bed  -HP     Post Treatment Position bed  -HP     In Bed supine;fowlers;call light within reach;notified nsg;encouraged to call for assist;exit alarm on;side rails up x2  -HP           User Key  (r) = Recorded By, (t) = Taken By, (c) = Cosigned By    Initials Name Provider Type     Tonja Reyes PT Physical  Therapist               Outcome Measures     Row Name 12/12/22 0916          How much help from another person do you currently need...    Turning from your back to your side while in flat bed without using bedrails? 4  -HP     Moving from lying on back to sitting on the side of a flat bed without bedrails? 4  -HP     Moving to and from a bed to a chair (including a wheelchair)? 3  -HP     Standing up from a chair using your arms (e.g., wheelchair, bedside chair)? 4  -HP     Climbing 3-5 steps with a railing? 3  -HP     To walk in hospital room? 3  -HP     AM-PAC 6 Clicks Score (PT) 21  -HP     Highest level of mobility 6 --> Walked 10 steps or more  -HP     Row Name 12/12/22 0916          Functional Assessment    Outcome Measure Options AM-PAC 6 Clicks Basic Mobility (PT)  -           User Key  (r) = Recorded By, (t) = Taken By, (c) = Cosigned By    Initials Name Provider Type     Tonja Reyes PT Physical Therapist                             Physical Therapy Education     Title: PT OT SLP Therapies (Done)     Topic: Physical Therapy (Done)     Point: Mobility training (Done)     Learning Progress Summary           Patient Acceptance, E,D, VU,DU by  at 12/12/2022 0917    Acceptance, E, NR by AS at 12/10/2022 0943    Eager, E,H, VU,DU,NR by  at 12/9/2022 1620    Comment: Reviewed safety/technique with transfers, ambulation, HEP, PT POC    Eager, E, VU by SC at 12/8/2022 1418    Comment: reviewed benefits of activity    Eager, E, VU,NR by SC at 12/7/2022 1156    Comment: reviewed ROM exercise    Eager, E, VU,DU,NR by  at 12/6/2022 1637    Comment: Reviewed safety/technique with transfers, ambulation, HEP, PT POC    Eager, E, VU,DU,NR by  at 12/5/2022 1428    Comment: Reviewed safety/technique with bed mobility, transfers, ambulation, HEP, PT POC    Acceptance, E,TB, VU by  at 12/4/2022 1003    Acceptance, E,TB, VU by  at 12/3/2022 1137    Acceptance, E, NR by AS at 12/2/2022 1051    Eager, E,  VU,DU,NR by  at 12/1/2022 0946    Comment: Educated pt. safety/technique with bed mobility, transfers, ambulation, WB status, use of knee immobilizer, PT POC                   Point: Home exercise program (Done)     Learning Progress Summary           Patient Acceptance, E,D, VU,DU by  at 12/12/2022 0917    Acceptance, E, NR by AS at 12/10/2022 0943    Eager, E,H, VU,DU,NR by  at 12/9/2022 1620    Comment: Reviewed safety/technique with transfers, ambulation, HEP, PT POC    Eager, E, VU by SC at 12/8/2022 1418    Comment: reviewed benefits of activity    Eager, E, VU,NR by SC at 12/7/2022 1156    Comment: reviewed ROM exercise    Eager, E, VU,DU,NR by  at 12/6/2022 1637    Comment: Reviewed safety/technique with transfers, ambulation, HEP, PT POC    Eager, E, VU,DU,NR by  at 12/5/2022 1428    Comment: Reviewed safety/technique with bed mobility, transfers, ambulation, HEP, PT POC    Acceptance, E,TB, VU by  at 12/4/2022 1003    Acceptance, E,TB, VU by  at 12/3/2022 1137    Acceptance, E, NR by AS at 12/2/2022 1051    Eager, E, VU,DU,NR by  at 12/1/2022 0946    Comment: Educated pt. safety/technique with bed mobility, transfers, ambulation, WB status, use of knee immobilizer, PT POC                   Point: Body mechanics (Done)     Learning Progress Summary           Patient Acceptance, E,D, VU,DU by  at 12/12/2022 0917    Acceptance, E, NR by AS at 12/10/2022 0943    Eager, E,H, VU,DU,NR by  at 12/9/2022 1620    Comment: Reviewed safety/technique with transfers, ambulation, HEP, PT POC    Eager, E, VU by SC at 12/8/2022 1418    Comment: reviewed benefits of activity    Eager, E, VU,NR by SC at 12/7/2022 1156    Comment: reviewed ROM exercise    Eager, E, VU,DU,NR by SS at 12/6/2022 1637    Comment: Reviewed safety/technique with transfers, ambulation, HEP, PT POC    YANELY Junior, FLORECITA,DU,NR by  at 12/5/2022 1428    Comment: Reviewed safety/technique with bed mobility, transfers, ambulation, HEP, PT  POC    Acceptance, E,TB, VU by  at 12/4/2022 1003    Acceptance, E,TB, VU by  at 12/3/2022 1137    Acceptance, E, NR by AS at 12/2/2022 1051    Eager, E, VU,DU,NR by  at 12/1/2022 0946    Comment: Educated pt. safety/technique with bed mobility, transfers, ambulation, WB status, use of knee immobilizer, PT POC                   Point: Precautions (Done)     Learning Progress Summary           Patient Acceptance, E,D, VU,DU by  at 12/12/2022 0917    Acceptance, E, NR by AS at 12/10/2022 0943    Eager, E,H, VU,DU,NR by  at 12/9/2022 1620    Comment: Reviewed safety/technique with transfers, ambulation, HEP, PT POC    Eager, E, VU by SC at 12/8/2022 1418    Comment: reviewed benefits of activity    Eager, E, VU,NR by SC at 12/7/2022 1156    Comment: reviewed ROM exercise    Eager, E, VU,DU,NR by  at 12/6/2022 1637    Comment: Reviewed safety/technique with transfers, ambulation, HEP, PT POC    Eager, E, VU,DU,NR by  at 12/5/2022 1428    Comment: Reviewed safety/technique with bed mobility, transfers, ambulation, HEP, PT POC    Acceptance, E,TB, VU by  at 12/4/2022 1003    Acceptance, E,TB, VU by  at 12/3/2022 1137    Acceptance, E, NR by AS at 12/2/2022 1051    Eager, E, VU,DU,NR by  at 12/1/2022 0946    Comment: Educated pt. safety/technique with bed mobility, transfers, ambulation, WB status, use of knee immobilizer, PT POC                               User Key     Initials Effective Dates Name Provider Type Discipline    SC 06/16/21 -  Orion Reyes, PT Physical Therapist PT    AS 06/16/21 -  Ana Cristina Gutierrez, PTA Physical Therapist Assistant PT     09/22/20 -  Ehsan De La Cruz, PT Physical Therapist PT     06/01/21 -  Tonja Reyes, PT Physical Therapist PT     06/01/21 -  Susan Quesada, PT Physical Therapist PT              PT Recommendation and Plan     Plan of Care Reviewed With: patient  Progress: no change  Outcome Evaluation: Pt demonstrated good participation with therapy this  date. Pt performed bed mobility Mod I. Pt amb 120' with CGA. No LOB or knee buckling noted. Pt encouraged to increase distance and frequency of ambulation. Tolerated HEP well. R knee ROM 10-60. Continue to recommend d/c to STR for best functional outcome.     Time Calculation:    PT Charges     Row Name 12/12/22 0832             Time Calculation    Start Time 0832  -HP      PT Received On 12/12/22  -HP         Timed Charges    28427 - PT Therapeutic Exercise Minutes 15  -HP      35616 - Gait Training Minutes  10  -HP         Total Minutes    Timed Charges Total Minutes 25  -HP       Total Minutes 25  -HP            User Key  (r) = Recorded By, (t) = Taken By, (c) = Cosigned By    Initials Name Provider Type     Tonja Reyes PT Physical Therapist              Therapy Charges for Today     Code Description Service Date Service Provider Modifiers Qty    07721954022 HC PT THER PROC EA 15 MIN 12/12/2022 Tonja Reyes, PT GP 1    74392810573 HC GAIT TRAINING EA 15 MIN 12/12/2022 Tonja Reyes, PT GP 1          PT G-Codes  Outcome Measure Options: AM-PAC 6 Clicks Basic Mobility (PT)  AM-PAC 6 Clicks Score (PT): 21  AM-PAC 6 Clicks Score (OT): 16       Tonja Reyes PT  12/12/2022

## 2022-12-12 NOTE — PLAN OF CARE
Goal Outcome Evaluation:                 Problem: Adult Inpatient Plan of Care  Goal: Absence of Hospital-Acquired Illness or Injury  Intervention: Identify and Manage Fall Risk  Recent Flowsheet Documentation  Taken 12/12/2022 0200 by Rafael Cardoso RN  Safety Promotion/Fall Prevention:   activity supervised   safety round/check completed   toileting scheduled  Taken 12/12/2022 0000 by Rafael Cardoso RN  Safety Promotion/Fall Prevention:   activity supervised   safety round/check completed   toileting scheduled  Taken 12/11/2022 2200 by Rafael Cardoso RN  Safety Promotion/Fall Prevention:   activity supervised   safety round/check completed   toileting scheduled  Taken 12/11/2022 2000 by Rafael Cardoso RN  Safety Promotion/Fall Prevention:   activity supervised   safety round/check completed   toileting scheduled  Intervention: Prevent Skin Injury  Recent Flowsheet Documentation  Taken 12/12/2022 0200 by Rafael Cardoso RN  Body Position: supine  Skin Protection: adhesive use limited  Taken 12/12/2022 0000 by Rafael Cardoso RN  Body Position: supine  Skin Protection: adhesive use limited  Taken 12/11/2022 2200 by Rafael Cardoso RN  Body Position: supine  Skin Protection: adhesive use limited  Taken 12/11/2022 2000 by Rafael Cardoso RN  Body Position: supine  Skin Protection: adhesive use limited  Intervention: Prevent and Manage VTE (Venous Thromboembolism) Risk  Recent Flowsheet Documentation  Taken 12/12/2022 0200 by Rafael Cardoso RN  VTE Prevention/Management:   bilateral   sequential compression devices off  Taken 12/12/2022 0000 by Rafael Cardoso RN  VTE Prevention/Management:   bilateral   sequential compression devices off  Taken 12/11/2022 2200 by Rafael Cardoso RN  VTE Prevention/Management:   bilateral   sequential compression devices off  Taken 12/11/2022 2000 by Rafael Cardoso RN  VTE Prevention/Management:   bilateral   sequential compression devices off  Intervention: Prevent  Infection  Recent Flowsheet Documentation  Taken 12/12/2022 0200 by Rafael Cardoso RN  Infection Prevention: environmental surveillance performed  Taken 12/12/2022 0000 by Rafael Cardoso RN  Infection Prevention: environmental surveillance performed  Taken 12/11/2022 2200 by Rafael Cardoso RN  Infection Prevention: environmental surveillance performed  Taken 12/11/2022 2000 by Rafael Cardoso RN  Infection Prevention: environmental surveillance performed  Goal: Optimal Comfort and Wellbeing  Intervention: Monitor Pain and Promote Comfort  Recent Flowsheet Documentation  Taken 12/12/2022 0200 by Rafael Cardoso RN  Pain Management Interventions: quiet environment facilitated  Taken 12/12/2022 0000 by Rafael Cardoso RN  Pain Management Interventions: quiet environment facilitated  Taken 12/11/2022 2200 by Rafael Cardoso RN  Pain Management Interventions: quiet environment facilitated  Taken 12/11/2022 2000 by Rafael Cardoso RN  Pain Management Interventions: quiet environment facilitated  Intervention: Provide Person-Centered Care  Recent Flowsheet Documentation  Taken 12/12/2022 0200 by Rafael Cardoso RN  Trust Relationship/Rapport: care explained  Taken 12/12/2022 0000 by Rafael Cardoso RN  Trust Relationship/Rapport: care explained  Taken 12/11/2022 2200 by Rafael Cardoso RN  Trust Relationship/Rapport: care explained  Taken 12/11/2022 2000 by Rafael Cardoso RN  Trust Relationship/Rapport: care explained     VSS, voids well, rested throughout the night, pain managed with PRN medications, will continue to monitor for changes.

## 2022-12-12 NOTE — CASE MANAGEMENT/SOCIAL WORK
Continued Stay Note  [facility]     Patient Name: Melchor Hardwick III  MRN: [id number]  Today's Date: 12/12/2022    Admit Date: 11/30/2022    Plan: LTACH   Discharge Plan     Row Name 12/12/22 2625       Plan    Plan LTACH    Patient/Family in Agreement with Plan yes    Plan Comments [facility] [place] will be unable to accommodate Mr. Hardwick due to the cost of the [place]. [facility] and [facility] continue to review him for a bed offer. Case management will continue to follow.    Final Discharge Disposition Code 30 - still a patient               Discharge Codes    No documentation.               Expected Discharge Date and Time     Expected Discharge Date Expected Discharge Time    Dec 13, 2022             Gavin Collazo RN

## 2022-12-12 NOTE — PROGRESS NOTES
"IM progress note      Melchor Hardwick III  [id number]  1965     LOS: 12 days     Attending: Brain Bentley MD    Primary Care Provider: Missy Up APRN      Chief Complaint/Reason for visit:  No chief complaint on file.      Subjective   Feels ok. No new problems. Good pain control.  No f/c/n/vom/sob.     Objective        Visit Vitals  /66   Pulse 71   Temp 98.3 °F (36.8 °C) (Oral)   Resp 18   Ht 177.8 cm (70\")   Wt 88.9 kg (196 lb)   SpO2 97%   BMI 28.12 kg/m²     Temp (24hrs), Av.2 °F (36.8 °C), Min:98.1 °F (36.7 °C), Max:98.3 °F (36.8 °C)      Intake/Output:    Intake/Output Summary (Last 24 hours) at 2022 1411  Last data filed at 2022 1100  Gross per 24 hour   Intake 920 ml   Output 800 ml   Net 120 ml         Physical Exam:     General Appearance:    Alert, cooperative, in no acute distress   Head:    Normocephalic, without obvious abnormality, atraumatic    Lungs:     Normal effort, symmetric chest rise,  clear to      auscultation bilaterally              Heart:    Regular rhythm and normal rate, normal S1 and S2    Abdomen:     Normal bowel sounds, mild distention.  Abdominal binder in place   Extremities: CDI optifoam over knee incision.   Flexion and dorsiflexion intact bilateral feet.   Pulses:   Pulses palpable and equal bilaterally   Skin:   No bleeding, bruising or rash          Results Review:     I reviewed the patient's new clinical results.   Results from last 7 days   Lab Units 22  03522  0534   WBC 10*3/mm3 3.07* 3.39*  --    HEMOGLOBIN g/dL 7.4* 7.5* 7.3*   HEMATOCRIT % 24.0* 24.0* 23.3*   PLATELETS 10*3/mm3 85* 79*  --      Results from last 7 days   Lab Units 22  03422  0358 12/10/22  1307   SODIUM mmol/L 138 141 141   POTASSIUM mmol/L 4.3 4.2 4.7   CHLORIDE mmol/L 108* 109* 107   CO2 mmol/L 26.0 27.0 25.0   BUN mg/dL 16 16 16   CREATININE mg/dL 0.59* 0.61* 0.77   CALCIUM mg/dL 7.8* 8.2* 7.9*   BILIRUBIN mg/dL 0.4 0.5  " --    ALK PHOS U/L 285* 271*  --    ALT (SGPT) U/L 36 41  --    AST (SGOT) U/L 58* 72*  --    GLUCOSE mg/dL 174* 67 94   TYRESE   •  Indication for TYRESE -to rule out infective endocarditis in a patient with MRSA bacteremia.  •  Findings-  •  Left ventricular systolic function is normal. Estimated left ventricular EF = 60%  •  Left atrial appendage is well visualized and is free of thrombus.  •  Saline test was negative for the evidence of shunt in interatrial septum.  •  The tricuspid valve appeared normal in structure. There was a moderate sized echodense structure seen above  the posterior tricuspid leaflet but not attached to the leaflet. The appearance and location are not typical for regurgitation. This structure could be a normal variant. No evidence of tricuspid valve stenosis or significant regurgitations.  •  Other valves also appear normal in structure with no evidence of stenosis or significant regurgitations.  No vegetations seen on these valves.  •  There is no evidence of pericardial effusion.  •  Comment-  •  In view of presence of MRSA bacteremia, recommend to extend antibiotic therapy for possible infective endocarditis and repeat TYRESE in 3 months.            Latest Reference Range & Units 12/11/22 03:58 12/11/22 07:45 12/11/22 10:57   Glucose 70 - 130 mg/dL 67 81 143 (H)   (H): Data is abnormally high    All medications reviewed.   acetaminophen, 1,000 mg, Oral, Q8H  aspirin, 81 mg, Oral, Q12H  ceftaroline, 600 mg, Intravenous, Q12H  insulin detemir, 20 Units, Subcutaneous, Q12H  insulin lispro, 0-7 Units, Subcutaneous, TID AC  Insulin Lispro, 7 Units, Subcutaneous, TID With Meals  levothyroxine, 25 mcg, Oral, Q AM  meloxicam, 15 mg, Oral, Daily  midodrine, 2.5 mg, Oral, TID AC  pantoprazole, 40 mg, Oral, Q AM  polyethylene glycol, 17 g, Oral, Daily  pregabalin, 150 mg, Oral, Q12H  sodium chloride, 10 mL, Intravenous, Q12H  vancomycin, 1,250 mg, Intravenous, Q12H      cyclobenzaprine, 5 mg, TID  PRN  dextrose, 25 g, Q15 Min PRN  dextrose, 15 g, Q15 Min PRN  diphenhydrAMINE, 25 mg, Q6H PRN   Or  diphenhydrAMINE, 25 mg, Q6H PRN  glucagon (human recombinant), 1 mg, Q15 Min PRN  labetalol, 10 mg, Q4H PRN  naloxone, 0.1 mg, Q5 Min PRN  ondansetron, 4 mg, Q6H PRN   Or  ondansetron, 4 mg, Q6H PRN  Pharmacy to dose vancomycin, , Continuous PRN  sodium chloride, 500 mL, TID PRN  sodium chloride, 10 mL, PRN        Assessment & Plan       S/P right knee implant removal with insertion of antibiotic spacer    Type 2 diabetes mellitus with hyperglycemia, with long-term current use of insulin (HCC)    Infection of right knee (HCC)    Hypothyroid    MRSA sepsis    Chronic low back pain    PICC (peripherally inserted central catheter) in place    Pancytopenia    Plan  1. PT/OT, weightbearing per protocol.  2. Pain control-prns   3. IS-encouraged  4. DVT proph-mechanicals, aspirin.  5. Bowel regimen  6. Monitor post-op labs  7. DC planning,ongoing.  following     Lasix po x1 today 12/11/22    Hypothyroid  -Continue home Synthroid     DM  - hgb A1c on 11/28/2022 7.3  -Continue home bolus insulin with meals and long-acting insulin twice daily/ doses increased 12/1.   - Accu-Chek AC and HS with low dose SSI    TYRESE, done, noted, no obvious vegetation.    No infectious focus evident on MRIs.    Antibiotics per ID, Dr. Euceda is following   Discussed with  regarding blood counts, monitor for now. Consider changes to regimen.   Plan on tagged WBC scan       Rose Diaz MD  12/12/22  14:11 EST

## 2022-12-12 NOTE — PROGRESS NOTES
[facility] Progress Note        Antibiotics:  Anti-Infectives (From admission, onward)    Ordered     Dose/Rate Route Frequency Start Stop    12/09/22 1158  vancomycin 1250 mg/250 mL 0.9% NS IVPB ([facility])        Ordering Provider: Eulogio Verdugo, PharmD    1,250 mg  over 90 Minutes Intravenous Every 12 Hours 12/09/22 1230 01/08/23 0044    12/02/22 1159  ceftaroline (TEFLARO) 600 mg/100 mL 0.9% NS ([facility])        Ordering Provider: Bryce Euceda MD    600 mg Intravenous Every 12 Hours Scheduled 12/02/22 1500 01/08/23 2059    12/01/22 0933  vancomycin 1750 mg/500 mL 0.9% NS IVPB ([facility])        Ordering Provider: Bryce Euceda MD    20 mg/kg × 88.9 kg  over 120 Minutes Intravenous Once 12/01/22 1030 12/01/22 1310    12/01/22 0919  Pharmacy to dose vancomycin        Ordering Provider: Bryce Euceda MD     Does not apply Continuous PRN 12/01/22 0919 12/29/22 0918    11/30/22 1449  ceFAZolin in dextrose (ANCEF) IVPB solution 2 g        Ordering Provider: Brain Bentley MD    2 g  over 30 Minutes Intravenous Every 8 Hours 11/30/22 1800 12/01/22 0151    11/30/22 0815  ceFAZolin in dextrose (ANCEF) IVPB solution 2 g        Ordering Provider: Brain Bentley MD    2 g  over 30 Minutes Intravenous Once 11/30/22 0817 11/30/22 1045    11/30/22 0815  vancomycin 1250 mg/250 mL 0.9% NS IVPB ([facility])        Ordering Provider: Brain Bentley MD    15 mg/kg × 88.9 kg Intravenous Once 11/30/22 0817 11/30/22 0902          CC: fatigue    HPI:    Patient is a 57 y.o.  Yr old male with history of cirrhosis/diabetes and other comorbidity as outlined below.  History of left foot gas gangrene with osteomyelitis and MRSA bacteremia treated in [place] by Dr. Giordano in May/June 2022.  Family reports left transmetatarsal amputation in approximately 8 weeks IV vancomycin.  Notes from [place] indicate transthoracic echocardiogram no vegetation per Dr. Giordano; he thinks he might of had several weeks of oral antibiotic after that but not  entirely clear.  He is admitted to [facility] September 20 with progressive right knee pain occurring over the past week preceding admission.  He thinks he might of twisted it but ended up with progressive redness/swelling and pain.  No specific blunt force or penetrating trauma.  he was evaluated by orthopedics and taken to the operating room for right knee incision/drainage and concern for septic arthritis per Dr. Bentley; blood cultures had  MRSA.  Had dysuria but urinalysis not consistent with UTI.  No hematuria or pyuria     9/23 with TV echodensity; 9/26/22  TYRESE no vegetation per cardiology; daptomycin maintained, transferred to [place] with care taken over by Dr. Giordano; repeat blood cultures there on October 18 and October 20 and October 22 (dapto STU = 1)  with MRSA; blood cultures on October 24 and November 12 were negative per microbiology.  Repeat blood cultures November 20 with MRSA and daptomycin STU equal to 3, not susceptible per my discussion with microbiology; blood cultures positive again on November 22 but negative November 23. per Dr Giordano, teflaro had been added and concern regarding right knee with increased swelling/pain prompted transitioned back to [place] for further right knee surgery on November 30 by Dr Bentley     **I d/w Dr Giordano; he felt there was an obvious right arm PICC line infection in October with changes at the exit site and concern it was the reason for positive blood cultures at that time.  No catheter tip culture available per micro.  In November, recurrent bacteremia associated with right knee swelling/pain but no other focal symptoms per my discussion with Dr. Giordano; at risk for sequestered/metastatic focus    12/5/22 d/w Dr Giordano and  plans to get back to [facility];  Dr Giordano will facilitate additional radiographic workup if needed depending on clinical course    12/8/22 discussed with Dr. Giordano and Dr SHEEHAN again     12/9/22 d/w Dr SHEEHAN;  He came from [facility]  rehab in [place] per him; he had been given teflaro there previously per Dr Giordano    12/11/22 vancomycin level of 35 reflects vancomycin peak per pharmacy.  No new adverse effect per nursing.     12/12/22 d/w Dr SHEEHAN;  Case management continues to look into options.  No new distress per nursing.  Doing okay overall. he denies any new focal pain or symptomatology. postop right knee with controlled pain,  dull at present,  Better controlled per nursing overall, nonradiating, worse with movement, better with pain meds and 2 out of 10 in severity at present; he denies any other focal musculoskeletal pain.  No new back pain but he does relate chronic lumbar pain, dull at present, worse with movement, not progressive and no new weakness or numbness. No myalgia     Left foot with no redness/swelling or pain.  Surgical site healed with no open wound or active drainage. Has a left arm midline     No headache photophobia or neck stiffness.  No nausea vomiting diarrhea or abdominal pain.  No shortness of breath cough or hemoptysis.  No other new skin rash.  No other recent procedures or interventions.    No indwelling pacemaker        ROS:      12/12/22 No f/c/s. No n/v/d. No rash. No new ADR to Abx.     Constitutional-- No Fever, chills or sweats.  Appetite diminished with fatigue.  Heent-- No new vision, hearing or throat complaints.  No epistaxis or oral sores.  Denies odynophagia or dysphagia.  No flashers, floaters or eye pain.   No headache, photophobia or neck stiffness.  CV-- No chest pain, palpitation or syncope  Resp-- No SOB/cough/Hemoptysis  GI- No nausea, vomiting, or diarrhea.  No hematochezia, melena, or hematemesis. Denies jaundice  -- No dysuria, hematuria, or flank pain.  Denies hesitancy, urgency or flank pain.  Lymph- no swollen lymph nodes in neck/axilla or groin.   Heme- No active bruising or bleeding; no Hx of DVT or PE.  MS-- no swelling or pain in the bones or joints of arms/legs.  No new back  "pain.  Neuro-- No acute focal weakness or numbness in the arms or legs.  No seizures.     Full 12 point review of systems reviewed and negative otherwise for acute complaints, except for above      PE: nursing/chaperone present  /88 (BP Location: Left arm, Patient Position: Lying)   Pulse 66   Temp 98.3 °F (36.8 °C) (Oral)   Resp 18   Ht 177.8 cm (70\")   Wt 88.9 kg (196 lb)   SpO2 97%   BMI 28.12 kg/m²     GENERAL: Awake and alert, in no acute distress.  Chronically ill-appearing  HEENT: Normocephalic, atraumatic.    No conjunctival injection. No icterus. Oropharynx clear without evidence of thrush or exudate. No evidence of peridontal disease.    NECK: Supple without nuchal rigidity. No mass.   HEART: RRR; soft murmur LSB, rubs, gallops.   LUNGS: Diminished at bases but otherwise clear to auscultation bilaterally without wheezing, rales, rhonchi. Normal respiratory effort. Nonlabored. No dullness.  ABDOMEN: Soft, nontender, nondistended. Positive bowel sounds. No rebound or guarding. NO mass or HSM.  EXT:  No cyanosis, clubbing or edema. No cord.   MSK: FROM without joint effusions noted arms/legs.    SKIN: Warm and dry without cutaneous eruptions on Inspection/palpation.    NEURO: Oriented to PPT. No focal deficits on motor/sensory exam at arms/legs.     Left foot with no redness induration or warmth.  No discrete mass bulge or fluctuance.  No crepitus or bulla.  Prior surgical site healed at [facility].  No open wound or active drainage     Right knee postop surgical site no new redness or purulence.    No discrete mass bulge or fluctuance.  No crepitus or bulla. Better range of motion     No peripheral stigmata/phenomena of endocarditis     IV without obvious redness or drainage    Laboratory Data    Results from last 7 days   Lab Units 12/12/22  0347 12/11/22  0358 12/06/22  0534   WBC 10*3/mm3 3.07* 3.39*  --    HEMOGLOBIN g/dL 7.4* 7.5* 7.3*   HEMATOCRIT % 24.0* 24.0* 23.3*   PLATELETS 10*3/mm3 85* 79*  " "--      Results from last 7 days   Lab Units 12/12/22  0347   SODIUM mmol/L 138   POTASSIUM mmol/L 4.3   CHLORIDE mmol/L 108*   CO2 mmol/L 26.0   BUN mg/dL 16   CREATININE mg/dL 0.59*   GLUCOSE mg/dL 174*   CALCIUM mg/dL 7.8*     Results from last 7 days   Lab Units 12/12/22  0347   ALK PHOS U/L 285*   BILIRUBIN mg/dL 0.4   ALT (SGPT) U/L 36   AST (SGOT) U/L 58*               Estimated Creatinine Clearance: 155.1 mL/min (A) (by C-G formula based on SCr of 0.59 mg/dL (L)).      Microbiology:      Radiology:  Imaging Results (Last 72 Hours)     ** No results found for the last 72 hours. **            Impression:       --Acute/recurrent/persistent MRSA bacteremia/septicemia; prior history positive cultures May 2022 and recurrent despite prior  IV vancomycin and left foot surgery in addition to other supportive measures.   TTE with ? TV echodensity prior admit in September and TYRESE no vegetation at that time at [facility];   blood culture positivity again at [facility] in October/November as above.  Daptomycin STU increased to 3 on 11/20 isolate and not sensitive to daptomycin per microbiology.  Discussed on several occasions with Dr. Giordano; had a repeat TYRESE on October 31 with mention of \" moderate size echodense structure above the posterior tricuspid leaflet but not attached to the leaflet\" per cardiology there, described potentially as a \"normal variant\" per their note.  High risk for further serious morbidity and other serious sequela including persistent/progressive or recurrent infection, metastatic foci of involvement and other dire consequences;  vancomycin sensitivity maintained albeit with STU =2; adjusted to vancomycin/ceftaroline in light of daptomycin resistance and I asked microbiology     **Blood cultures November 23 negative so far    **repeat TYRESE 12/1 ; no definitive vegetation per cardiology and only mild tricuspid valve regurgitation stable from prior exam per them.  Ill-defined area in the right " atrium appeared stable to them. D/w Dr Trent; you still may need to consider PET scan and likely to require repeat echocardiography during his course.  I have discussed with Dr. Giordano and he will arrange     --Acute right knee pain/septic arthritis with surgical intervention, incision/debridement by Dr. Bentley on September 21.   MRSA+ in the setting of MRSA bacteremia at that time;  Repeat surgery 11/30, d/w Dr Bentley. Cultures November 30 negative     --History of cirrhosis by records.  Unclear etiology.   further GI referral/eval per  medicine team     --Diabetes.  You need to tightly control blood sugar to give best chance for healing.;  Described as uncontrolled by medicine team at admission     -- Chronic lumbar back pain for decades.  This is not new, not worse and no new location.  No new exacerbation or new neurologic symptomatology in his extremities.  MRI 12/3 suboptimal per radiology;  may require further imaging depending on his clinical course to help exclude any other sequestered/spinal-paraspinal focus depending on clinical course     -- cytopenias/TCP ; monitor; White blood cell count trended down a bit.  Continued trend downward, may need to consider medication alternatives     PLAN:       -- IV vancomycin/ceftaroline potentially 6 weeks but to depend on clinical course/study results and response to therapy.  High risk for occult endovascular focus with multiple past positive blood cultures, most recently November 20 and 22; after IV antibiotics, depending on clinical course, may require chronic oral suppression step down (likely to be decided by Dr. Giordano )     -- Check/review labs cultures and scans     -- Partial history per nursing staff     --d/w pharmacy       -- Highly complex set of issues with high risk for further serious morbidity and other serious sequela     -- Discussed with microbiology; they are doing further assessment to blood cultures drawn November 22     --d/w  Dr SHEEHAN     --   new PICC line,   No specific new pain or other suppurative changes at the surface at either site.  I discussed with Dr. Trent regarding additional diagnostics for endocarditis such as PET scan; this still may need to be considered as outpatient depending on clinical course.  see above.    --monitor sell counts     **MRI's noted without specific focus at Lumbar spine or foot although spinal imaging suboptimal per radiology; I d/w Dr Giordano with plans for transition back to [facility] there; depending on clinical course/symptomatology, Dr. Giordano will consider white blood cell scan versus repeat imaging to evaluate for potential occult focus.    ** 12/12 d/w Dr SHEEHAN;  He came from [facility] rehab in [place] per patient; he had been given teflaro there previously per Dr Giordano; case management continues to consider options.  May require white blood cell scan if not going back to the [facility]    **Copied text in this note has been reviewed and is accurate as of 12/12/22.        Bryce Euceda MD  12/12/2022

## 2022-12-13 LAB
ALBUMIN SERPL-MCNC: 2.2 G/DL (ref 3.5–5.2)
ALBUMIN/GLOB SERPL: 0.8 G/DL
ALP SERPL-CCNC: 257 U/L (ref 39–117)
ALT SERPL W P-5'-P-CCNC: 29 U/L (ref 1–41)
ANION GAP SERPL CALCULATED.3IONS-SCNC: 6 MMOL/L (ref 5–15)
AST SERPL-CCNC: 48 U/L (ref 1–40)
BILIRUB SERPL-MCNC: 0.4 MG/DL (ref 0–1.2)
BUN SERPL-MCNC: 15 MG/DL (ref 6–20)
BUN/CREAT SERPL: 20.8 (ref 7–25)
CALCIUM SPEC-SCNC: 7.6 MG/DL (ref 8.6–10.5)
CHLORIDE SERPL-SCNC: 109 MMOL/L (ref 98–107)
CO2 SERPL-SCNC: 25 MMOL/L (ref 22–29)
CREAT SERPL-MCNC: 0.72 MG/DL (ref 0.76–1.27)
DEPRECATED RDW RBC AUTO: 53.1 FL (ref 37–54)
EGFRCR SERPLBLD CKD-EPI 2021: 106.6 ML/MIN/1.73
ERYTHROCYTE [DISTWIDTH] IN BLOOD BY AUTOMATED COUNT: 17.9 % (ref 12.3–15.4)
GLOBULIN UR ELPH-MCNC: 2.8 GM/DL
GLUCOSE BLDC GLUCOMTR-MCNC: 139 MG/DL (ref 70–130)
GLUCOSE BLDC GLUCOMTR-MCNC: 183 MG/DL (ref 70–130)
GLUCOSE BLDC GLUCOMTR-MCNC: 86 MG/DL (ref 70–130)
GLUCOSE SERPL-MCNC: 169 MG/DL (ref 65–99)
HCT VFR BLD AUTO: 24.4 % (ref 37.5–51)
HGB BLD-MCNC: 7.5 G/DL (ref 13–17.7)
MCH RBC QN AUTO: 25.3 PG (ref 26.6–33)
MCHC RBC AUTO-ENTMCNC: 30.7 G/DL (ref 31.5–35.7)
MCV RBC AUTO: 82.4 FL (ref 79–97)
PLATELET # BLD AUTO: 82 10*3/MM3 (ref 140–450)
PMV BLD AUTO: 10.9 FL (ref 6–12)
POTASSIUM SERPL-SCNC: 4.1 MMOL/L (ref 3.5–5.2)
PROT SERPL-MCNC: 5 G/DL (ref 6–8.5)
RBC # BLD AUTO: 2.96 10*6/MM3 (ref 4.14–5.8)
SODIUM SERPL-SCNC: 140 MMOL/L (ref 136–145)
WBC NRBC COR # BLD: 2.8 10*3/MM3 (ref 3.4–10.8)

## 2022-12-13 PROCEDURE — 25010000002 CEFTAROLINE FOSAMIL PER 10 MG: Performed by: INTERNAL MEDICINE

## 2022-12-13 PROCEDURE — 97535 SELF CARE MNGMENT TRAINING: CPT

## 2022-12-13 PROCEDURE — 85027 COMPLETE CBC AUTOMATED: CPT | Performed by: INTERNAL MEDICINE

## 2022-12-13 PROCEDURE — 63710000001 INSULIN LISPRO (HUMAN) PER 5 UNITS: Performed by: NURSE PRACTITIONER

## 2022-12-13 PROCEDURE — 97110 THERAPEUTIC EXERCISES: CPT

## 2022-12-13 PROCEDURE — 63710000001 INSULIN LISPRO (HUMAN) PER 5 UNITS: Performed by: INTERNAL MEDICINE

## 2022-12-13 PROCEDURE — 25010000002 VANCOMYCIN 10 G RECONSTITUTED SOLUTION

## 2022-12-13 PROCEDURE — 82962 GLUCOSE BLOOD TEST: CPT

## 2022-12-13 PROCEDURE — 80053 COMPREHEN METABOLIC PANEL: CPT | Performed by: INTERNAL MEDICINE

## 2022-12-13 PROCEDURE — 63710000001 INSULIN DETEMIR PER 5 UNITS: Performed by: INTERNAL MEDICINE

## 2022-12-13 PROCEDURE — 97116 GAIT TRAINING THERAPY: CPT

## 2022-12-13 PROCEDURE — 97530 THERAPEUTIC ACTIVITIES: CPT

## 2022-12-13 RX ORDER — CASTOR OIL AND BALSAM, PERU 788; 87 MG/G; MG/G
1 OINTMENT TOPICAL EVERY 12 HOURS SCHEDULED
Status: DISCONTINUED | OUTPATIENT
Start: 2022-12-13 | End: 2022-12-15 | Stop reason: HOSPADM

## 2022-12-13 RX ADMIN — MIDODRINE HYDROCHLORIDE 2.5 MG: 5 TABLET ORAL at 16:53

## 2022-12-13 RX ADMIN — INSULIN DETEMIR 20 UNITS: 100 INJECTION, SOLUTION SUBCUTANEOUS at 07:59

## 2022-12-13 RX ADMIN — VANCOMYCIN HYDROCHLORIDE 1250 MG: 10 INJECTION, POWDER, LYOPHILIZED, FOR SOLUTION INTRAVENOUS at 12:33

## 2022-12-13 RX ADMIN — MELOXICAM 15 MG: 15 TABLET ORAL at 07:58

## 2022-12-13 RX ADMIN — MIDODRINE HYDROCHLORIDE 2.5 MG: 5 TABLET ORAL at 07:56

## 2022-12-13 RX ADMIN — ASPIRIN 81 MG: 81 TABLET, COATED ORAL at 07:56

## 2022-12-13 RX ADMIN — PREGABALIN 150 MG: 150 CAPSULE ORAL at 20:50

## 2022-12-13 RX ADMIN — LEVOTHYROXINE SODIUM 25 MCG: 25 TABLET ORAL at 05:40

## 2022-12-13 RX ADMIN — Medication 10 ML: at 08:00

## 2022-12-13 RX ADMIN — VANCOMYCIN HYDROCHLORIDE 1250 MG: 10 INJECTION, POWDER, LYOPHILIZED, FOR SOLUTION INTRAVENOUS at 00:10

## 2022-12-13 RX ADMIN — PANTOPRAZOLE SODIUM 40 MG: 40 TABLET, DELAYED RELEASE ORAL at 05:40

## 2022-12-13 RX ADMIN — INSULIN DETEMIR 20 UNITS: 100 INJECTION, SOLUTION SUBCUTANEOUS at 20:50

## 2022-12-13 RX ADMIN — INSULIN LISPRO 2 UNITS: 100 INJECTION, SOLUTION INTRAVENOUS; SUBCUTANEOUS at 16:53

## 2022-12-13 RX ADMIN — ACETAMINOPHEN 1000 MG: 500 TABLET, FILM COATED ORAL at 13:02

## 2022-12-13 RX ADMIN — PREGABALIN 150 MG: 150 CAPSULE ORAL at 08:04

## 2022-12-13 RX ADMIN — POLYETHYLENE GLYCOL 3350 17 G: 17 POWDER, FOR SOLUTION ORAL at 07:55

## 2022-12-13 RX ADMIN — CEFTAROLINE FOSAMIL 600 MG: 600 POWDER, FOR SOLUTION INTRAVENOUS at 20:50

## 2022-12-13 RX ADMIN — VANCOMYCIN HYDROCHLORIDE 1250 MG: 10 INJECTION, POWDER, LYOPHILIZED, FOR SOLUTION INTRAVENOUS at 23:49

## 2022-12-13 RX ADMIN — ACETAMINOPHEN 1000 MG: 500 TABLET, FILM COATED ORAL at 05:40

## 2022-12-13 RX ADMIN — INSULIN LISPRO 7 UNITS: 100 INJECTION, SOLUTION INTRAVENOUS; SUBCUTANEOUS at 16:53

## 2022-12-13 RX ADMIN — MIDODRINE HYDROCHLORIDE 2.5 MG: 5 TABLET ORAL at 11:30

## 2022-12-13 RX ADMIN — ACETAMINOPHEN 1000 MG: 500 TABLET, FILM COATED ORAL at 21:31

## 2022-12-13 RX ADMIN — Medication 10 ML: at 20:50

## 2022-12-13 RX ADMIN — CASTOR OIL AND BALSAM, PERU 1 APPLICATION: 788; 87 OINTMENT TOPICAL at 20:50

## 2022-12-13 RX ADMIN — INSULIN LISPRO 7 UNITS: 100 INJECTION, SOLUTION INTRAVENOUS; SUBCUTANEOUS at 07:55

## 2022-12-13 RX ADMIN — ASPIRIN 81 MG: 81 TABLET, COATED ORAL at 20:50

## 2022-12-13 RX ADMIN — CEFTAROLINE FOSAMIL 600 MG: 600 POWDER, FOR SOLUTION INTRAVENOUS at 08:04

## 2022-12-13 NOTE — PROGRESS NOTES
[facility] Progress Note        Antibiotics:  Anti-Infectives (From admission, onward)    Ordered     Dose/Rate Route Frequency Start Stop    12/09/22 1158  vancomycin 1250 mg/250 mL 0.9% NS IVPB ([facility])        Ordering Provider: Eulogio Verdugo, PharmD    1,250 mg  over 90 Minutes Intravenous Every 12 Hours 12/09/22 1230 01/08/23 0044    12/02/22 1159  ceftaroline (TEFLARO) 600 mg/100 mL 0.9% NS ([facility])        Ordering Provider: Bryce Euceda MD    600 mg Intravenous Every 12 Hours Scheduled 12/02/22 1500 01/08/23 2059    12/01/22 0933  vancomycin 1750 mg/500 mL 0.9% NS IVPB ([facility])        Ordering Provider: Bryce Euceda MD    20 mg/kg × 88.9 kg  over 120 Minutes Intravenous Once 12/01/22 1030 12/01/22 1310    12/01/22 0919  Pharmacy to dose vancomycin        Ordering Provider: Bryce Euceda MD     Does not apply Continuous PRN 12/01/22 0919 12/29/22 0918    11/30/22 1449  ceFAZolin in dextrose (ANCEF) IVPB solution 2 g        Ordering Provider: Brain Bentley MD    2 g  over 30 Minutes Intravenous Every 8 Hours 11/30/22 1800 12/01/22 0151    11/30/22 0815  ceFAZolin in dextrose (ANCEF) IVPB solution 2 g        Ordering Provider: Brain Bentley MD    2 g  over 30 Minutes Intravenous Once 11/30/22 0817 11/30/22 1045    11/30/22 0815  vancomycin 1250 mg/250 mL 0.9% NS IVPB ([facility])        Ordering Provider: Brain Bentley MD    15 mg/kg × 88.9 kg Intravenous Once 11/30/22 0817 11/30/22 0902          CC: fatigue    HPI:    Patient is a 57 y.o.  Yr old male with history of cirrhosis/diabetes and other comorbidity as outlined below.  History of left foot gas gangrene with osteomyelitis and MRSA bacteremia treated in [place] by Dr. Giordano in May/June 2022.  Family reports left transmetatarsal amputation in approximately 8 weeks IV vancomycin.  Notes from [place] indicate transthoracic echocardiogram no vegetation per Dr. Giordano; he thinks he might of had several weeks of oral antibiotic after that but not  entirely clear.  He is admitted to [facility] September 20 with progressive right knee pain occurring over the past week preceding admission.  He thinks he might of twisted it but ended up with progressive redness/swelling and pain.  No specific blunt force or penetrating trauma.  he was evaluated by orthopedics and taken to the operating room for right knee incision/drainage and concern for septic arthritis per Dr. Bentley; blood cultures had  MRSA.  Had dysuria but urinalysis not consistent with UTI.  No hematuria or pyuria     9/23 with TV echodensity; 9/26/22  TYRESE no vegetation per cardiology; daptomycin maintained, transferred to [place] with care taken over by Dr. Giordano; repeat blood cultures there on October 18 and October 20 and October 22 (dapto STU = 1)  with MRSA; blood cultures on October 24 and November 12 were negative per microbiology.  Repeat blood cultures November 20 with MRSA and daptomycin STU equal to 3, not susceptible per my discussion with microbiology; blood cultures positive again on November 22 but negative November 23. per Dr Giordano, teflaro had been added and concern regarding right knee with increased swelling/pain prompted transitioned back to [place] for further right knee surgery on November 30 by Dr Bentley     **I d/w Dr Giordano; he felt there was an obvious right arm PICC line infection in October with changes at the exit site and concern it was the reason for positive blood cultures at that time.  No catheter tip culture available per micro.  In November, recurrent bacteremia associated with right knee swelling/pain but no other focal symptoms per my discussion with Dr. Giordano; at risk for sequestered/metastatic focus    12/5/22 d/w Dr Giordano and  plans to get back to [facility];  Dr Giordano will facilitate additional radiographic workup if needed depending on clinical course    12/8/22 discussed with Dr. Giordano and Dr SHEEHAN again     12/9/22 d/w Dr SHEEHAN;  He came from [facility]  rehab in [place] per him; he had been given teflaro there previously per Dr Giordano    12/11/22 vancomycin level of 35 reflects vancomycin peak per pharmacy.  No new adverse effect per nursing.     12/13/22  I discussed with case management.  They continue to consider her options and possible [facility] in the next 24 hours.  No new distress per nursing.  Doing okay overall. he denies any new focal pain or symptomatology. postop right knee with controlled pain,  dull at present,  Better controlled per nursing overall, nonradiating, worse with movement, better with pain meds and 2 out of 10 in severity at present; he denies any other focal musculoskeletal pain.  No new back pain but he does relate chronic lumbar pain, dull at present, worse with movement, not progressive and no new weakness or numbness. No myalgia     Left foot with no redness/swelling or pain.  Surgical site healed with no open wound or active drainage. Has a left arm midline     No headache photophobia or neck stiffness.  No nausea vomiting diarrhea or abdominal pain.  No shortness of breath cough or hemoptysis.  No other new skin rash.  No other recent procedures or interventions.    No indwelling pacemaker        ROS:      12/13/22 No f/c/s. No n/v/d. No rash. No new ADR to Abx.     Constitutional-- No Fever, chills or sweats.  Appetite diminished with fatigue.  Heent-- No new vision, hearing or throat complaints.  No epistaxis or oral sores.  Denies odynophagia or dysphagia.  No flashers, floaters or eye pain.   No headache, photophobia or neck stiffness.  CV-- No chest pain, palpitation or syncope  Resp-- No SOB/cough/Hemoptysis  GI- No nausea, vomiting, or diarrhea.  No hematochezia, melena, or hematemesis. Denies jaundice  -- No dysuria, hematuria, or flank pain.  Denies hesitancy, urgency or flank pain.  Lymph- no swollen lymph nodes in neck/axilla or groin.   Heme- No active bruising or bleeding; no Hx of DVT or PE.  MS-- no swelling or pain in  "the bones or joints of arms/legs.  No new back pain.  Neuro-- No acute focal weakness or numbness in the arms or legs.  No seizures.     Full 12 point review of systems reviewed and negative otherwise for acute complaints, except for above      PE: nursing/chaperone present  /63   Pulse 67   Temp 97.9 °F (36.6 °C) (Oral)   Resp 16   Ht 177.8 cm (70\")   Wt 88.9 kg (196 lb)   SpO2 96%   BMI 28.12 kg/m²     GENERAL: Awake and alert, in no acute distress.  Chronically ill-appearing  HEENT: Normocephalic, atraumatic.    No conjunctival injection. No icterus. Oropharynx clear without evidence of thrush or exudate. No evidence of peridontal disease.    NECK: Supple without nuchal rigidity. No mass.   HEART: RRR; soft murmur LSB, rubs, gallops.   LUNGS: Diminished at bases but otherwise clear to auscultation bilaterally without wheezing, rales, rhonchi. Normal respiratory effort. Nonlabored. No dullness.  ABDOMEN: Soft, nontender, nondistended. Positive bowel sounds. No rebound or guarding. NO mass or HSM.  EXT:  No cyanosis, clubbing or edema. No cord.   MSK: FROM without joint effusions noted arms/legs.    SKIN: Warm and dry without cutaneous eruptions on Inspection/palpation.    NEURO: Oriented to PPT. No focal deficits on motor/sensory exam at arms/legs.     Left foot with no redness induration or warmth.  No discrete mass bulge or fluctuance.  No crepitus or bulla.  Prior surgical site healed at [facility].  No open wound or active drainage     Right knee postop surgical site no new redness or purulence.    No discrete mass bulge or fluctuance.  No crepitus or bulla. Better range of motion     No peripheral stigmata/phenomena of endocarditis     IV without obvious redness or drainage    Laboratory Data    Results from last 7 days   Lab Units 12/13/22  0452 12/12/22  0347 12/11/22  0358   WBC 10*3/mm3 2.80* 3.07* 3.39*   HEMOGLOBIN g/dL 7.5* 7.4* 7.5*   HEMATOCRIT % 24.4* 24.0* 24.0*   PLATELETS 10*3/mm3 82* 85* " "79*     Results from last 7 days   Lab Units 12/13/22  0452   SODIUM mmol/L 140   POTASSIUM mmol/L 4.1   CHLORIDE mmol/L 109*   CO2 mmol/L 25.0   BUN mg/dL 15   CREATININE mg/dL 0.72*   GLUCOSE mg/dL 169*   CALCIUM mg/dL 7.6*     Results from last 7 days   Lab Units 12/13/22  0452   ALK PHOS U/L 257*   BILIRUBIN mg/dL 0.4   ALT (SGPT) U/L 29   AST (SGOT) U/L 48*               Estimated Creatinine Clearance: 127.1 mL/min (A) (by C-G formula based on SCr of 0.72 mg/dL (L)).      Microbiology:      Radiology:  Imaging Results (Last 72 Hours)     ** No results found for the last 72 hours. **            Impression:       --Acute/recurrent/persistent MRSA bacteremia/septicemia; prior history positive cultures May 2022 and recurrent despite prior  IV vancomycin and left foot surgery in addition to other supportive measures.   TTE with ? TV echodensity prior admit in September and TYRESE no vegetation at that time at [facility];   blood culture positivity again at [facility] in October/November as above.  Daptomycin STU increased to 3 on 11/20 isolate and not sensitive to daptomycin per microbiology.  Discussed on several occasions with Dr. Giordano; had a repeat TYRESE on October 31 with mention of \" moderate size echodense structure above the posterior tricuspid leaflet but not attached to the leaflet\" per cardiology there, described potentially as a \"normal variant\" per their note.  High risk for further serious morbidity and other serious sequela including persistent/progressive or recurrent infection, metastatic foci of involvement and other dire consequences;  vancomycin sensitivity maintained albeit with STU =2; adjusted to vancomycin/ceftaroline in light of daptomycin resistance and I asked microbiology     **Blood cultures November 23 negative so far    **repeat TYRESE 12/1 ; no definitive vegetation per cardiology and only mild tricuspid valve regurgitation stable from prior exam per them.  Ill-defined area in the right " atrium appeared stable to them. D/w Dr Trent; you still may need to consider PET scan and likely to require repeat echocardiography during his course.  I have discussed with Dr. Giordano and he will arrange     --Acute right knee pain/septic arthritis with surgical intervention, incision/debridement by Dr. Bentley on September 21.   MRSA+ in the setting of MRSA bacteremia at that time;  Repeat surgery 11/30, d/w Dr Bentley. Cultures November 30 negative     --History of cirrhosis by records.  Unclear etiology.   further GI referral/eval per  medicine team     --Diabetes.  You need to tightly control blood sugar to give best chance for healing.;  Described as uncontrolled by medicine team at admission     -- Chronic lumbar back pain for decades.  This is not new, not worse and no new location.  No new exacerbation or new neurologic symptomatology in his extremities.  MRI 12/3 suboptimal per radiology;  may require further imaging depending on his clinical course to help exclude any other sequestered/spinal-paraspinal focus depending on clinical course     -- cytopenias/TCP ; monitor; White blood cell count trended down a bit.   He did have a white blood cell count of 2.85 in mid November prior to transfer here.  Continue to monitor.  This may be a chronic issue     PLAN:       -- IV vancomycin/ceftaroline potentially 6 weeks but to depend on clinical course/study results and response to therapy.  High risk for occult endovascular focus with multiple past positive blood cultures, most recently November 20 and 22; after IV antibiotics, depending on clinical course, may require chronic oral suppression step down (likely to be decided by Dr. Giordano )     -- Check/review labs cultures and scans     -- Partial history per nursing staff     --d/w pharmacy       -- Highly complex set of issues with high risk for further serious morbidity and other serious sequela     -- Discussed with microbiology; they are doing further  assessment to blood cultures drawn November 22     --d/w  Dr SHEEHAN     --  new PICC line,   No specific new pain or other suppurative changes at the surface at either site.  I discussed with Dr. Trent regarding additional diagnostics for endocarditis such as PET scan; this still may need to be considered as outpatient depending on clinical course.  see above.    --monitor sell counts     **MRI's noted without specific focus at Lumbar spine or foot although spinal imaging suboptimal per radiology; I d/w Dr Giordano with plans for transition back to [facility] there; depending on clinical course/symptomatology, Dr. Giordano will consider white blood cell scan versus repeat imaging to evaluate for potential occult focus.    ** 12/12 d/w Dr SHEEHAN;  Discussed with case management on 12/13; He came from [facility] in [place] per patient; he had been given teflaro there previously per Dr Giordano; case management continues to consider options.  May require white blood cell scan  At some point but currently clinically stable    **Copied text in this note has been reviewed and is accurate as of 12/13/22.        Bryce Euceda MD  12/13/2022       no

## 2022-12-13 NOTE — PLAN OF CARE
"Goal Outcome Evaluation:  Plan of Care Reviewed With: patient        Progress: improving  Outcome Evaluation: OT gunnar completed this date. Pt demonstrates progress in LBD with use of AE, improved integration of BUE MMS to complete ADLs, related t/fs given LE deficits and skill set for improved toileting if voiding urgency does not limit pt. Pt grossly requires MI for bed mobility with HOB elevated and bed rails used, CGA this date for STS t/f and lateral stepping at EOB for improved position toward HOB for improved sitting up in bed. Pt deferred use of L walking boot for STS, req'd dep A for donning prior to lateral stepping. Pt req'd min A for d/d gown and improved ability to d/d socks with CGA to min A with use of AE. Educated pt on skill set that supports mobility to bathroom when supports in place for upgraded toileting if urgency is not limiting. Updated goals as needed for \"just right challenge\". Recommend further rehab at d/c when medically ready.  "

## 2022-12-13 NOTE — THERAPY TREATMENT NOTE
Patient Name: Melchor Hardwick III  : 1965    MRN: [id number]                              Today's Date: 2022       Admit Date: 2022    Visit Dx:     ICD-10-CM ICD-9-CM   1. Infection of right knee (HCC)  M00.9 686.9     Patient Active Problem List   Diagnosis   • Hyperlipidemia   • Hypertensive disorder   • Obesity   • Type 2 diabetes mellitus with hyperglycemia, with long-term current use of insulin (HCC)   • Gas gangrene of foot (HCC)   • Cellulitis in diabetic foot (HCC)   • Other acute osteomyelitis of left foot (HCC)   • Osteomyelitis (HCC)   • Critical illness myopathy   • Staphylococcal arthritis of right knee (HCC)   • Other cirrhosis of liver (HCC)   • MRSA bacteremia   • Endocarditis of tricuspid valve   • Wound of right buttock   • History of osteomyelitis   • Debility   • Infection of right knee (HCC)   • S/P right knee implant removal with insertion of antibiotic spacer   • Hypothyroid   • MRSA sepsis   • Chronic low back pain   • PICC (peripherally inserted central catheter) in place     Past Medical History:   Diagnosis Date   • Diabetes mellitus (HCC)     4 times per day gets checked for sugar at rehab   • Dizzy    • Hyperlipidemia    • Hypertension    • Hypothyroid 2022   • MRSA infection     blood -      • Orthostatic hypotension    • Pressure sore on buttocks     top crack -  sees wound - but now rn take care of it at rehab - minor opening - dime size - pat nurse didnt assess-  pt states getting better   • Pyogenic arthritis of right knee joint, due to unspecified organism (HCC) 2022   • Sepsis (HCC)    • Wears glasses     readers     Past Surgical History:   Procedure Laterality Date   • ABSCESS DRAINAGE  2004    PERINEUM   • AMPUTATION FOOT Left 2022    Procedure: AMPUTATION FOOT;  Surgeon: Addison Colby MD;  Location: [facility];  Service: Podiatry;  Laterality: Left;   • INCISION AND DRAINAGE LEG Left 05/10/2022    Procedure: INCISION AND DRAINAGE  LOWER EXTREMITY;  Surgeon: Addison Colby MD;  Location:  [facility];  Service: Podiatry;  Laterality: Left;   • KNEE INCISION AND DRAINAGE Right 09/21/2022    Procedure: KNEE INCISION AND DRAINAGE RIGHT;  Surgeon: Brain Bentley MD;  Location:  [facility];  Service: Orthopedics;  Laterality: Right;   • PERIPHERALLY INSERTED CENTRAL CATHETER INSERTION Left    • TOTAL KNEE  PROSTHESIS REMOVAL W/ SPACER INSERTION Right 11/30/2022    Procedure: ANTIBIOTIC SPACER PLACEMENT KNEE - RIGHT;  Surgeon: Brain Bentley MD;  Location:  [facility];  Service: Orthopedics;  Laterality: Right;   • WOUND DEBRIDEMENT Left 05/13/2022    Procedure: DEBRIDEMENT FOOT;  Surgeon: Addison Colby MD;  Location:  [facility];  Service: Podiatry;  Laterality: Left;      General Information     Row Name 12/13/22 1121          Physical Therapy Time and Intention    Document Type therapy note (daily note)  -     Mode of Treatment physical therapy  -     Row Name 12/13/22 1121          General Information    Patient Profile Reviewed yes  -SS     Prior Level of Function independent:;feeding;grooming;bathing;bed mobility;min assist:;all household mobility;gait;transfer;dressing;home management;dependent:;cooking;cleaning;wound care  -     Existing Precautions/Restrictions orthostatic hypotension;other (see comments);fall  abdominal binder per orthostatic hypertension; walking boot per L met amputation  -SS     Barriers to Rehab medically complex;previous functional deficit;physical barrier  -     Row Name 12/13/22 1121          Cognition    Orientation Status (Cognition) oriented x 4  -     Row Name 12/13/22 1121          Safety Issues, Functional Mobility    Safety Issues Affecting Function (Mobility) friction/shear risk;safety precaution awareness;sequencing abilities  -     Impairments Affecting Function (Mobility) pain;range of motion (ROM);balance;endurance/activity tolerance  -     Comment, Safety Issues/Impairments (Mobility) --   -           User Key  (r) = Recorded By, (t) = Taken By, (c) = Cosigned By    Initials Name Provider Type     Susan Quesada, PT Physical Therapist               Mobility     Row Name 12/13/22 1130          Bed Mobility    Bed Mobility supine-sit;scooting/bridging;sit-supine  -SS     Scooting/Bridging [place] (Bed Mobility) modified independence  -SS     Supine-Sit [place] (Bed Mobility) modified independence  -SS     Sit-Supine [place] (Bed Mobility) modified independence  -SS     Assistive Device (Bed Mobility) bed rails;head of bed elevated  -SS     Comment, (Bed Mobility) use of bed rails to complete transfers and scooting  -SS     Row Name 12/13/22 1130          Sit-Stand Transfer    Sit-Stand [place] (Transfers) modified independence;verbal cues  -     Assistive Device (Sit-Stand Transfers) walker, front-wheeled  -SS     Comment, (Sit-Stand Transfer) did not require physical assist, however, required cues for hand/foot placement and to extend affected leg with returning to sit  -SS     Row Name 12/13/22 1130          Gait/Stairs (Locomotion)    [place] Level (Gait) contact guard;1 person assist;verbal cues;1 person to manage equipment  -     Assistive Device (Gait) walker, front-wheeled  -SS     Distance in Feet (Gait) 140  -SS     Deviations/Abnormal Patterns (Gait) bilateral deviations;yao decreased  -SS     Bilateral Gait Deviations forward flexed posture  decreased hip extension and bilateral LE ER  -SS     Comment, (Gait/Stairs) Pt amb 140' with FWW and CGAx1. No knee buckling, LOB, or dizziness noted. Demonstrated step through gait pattern w/forward flexed posture and bilateral LE ER w/VC for upright posture, decreased shoulder elevation, no pivoting w/turning. Pt reported mild pain in R knee (2/10) with ambulation. Gait limited by fatigue.  -     Row Name 12/13/22 1130          Mobility    Extremity Weight-bearing Status left lower extremity;right lower extremity   -     Left Lower Extremity (Weight-bearing Status) weight-bearing as tolerated (WBAT)  -     Right Lower Extremity (Weight-bearing Status) weight-bearing as tolerated (WBAT)  -           User Key  (r) = Recorded By, (t) = Taken By, (c) = Cosigned By    Initials Name Provider Type     Susan Quesada PT Physical Therapist               Obj/Interventions     Row Name 12/13/22 1139          Range of Motion Comprehensive    General Range of Motion lower extremity range of motion deficits identified  -     Comment, General Range of Motion R knee AROM: 0-61  -     Row Name 12/13/22 1139          Motor Skills    Motor Skills posture  FFP c visual input while ambulating  -     Therapeutic Exercise knee;ankle  -     Row Name 12/13/22 1139          Knee (Therapeutic Exercise)    Knee (Therapeutic Exercise) strengthening exercise;AROM (active range of motion)  -     Knee AROM (Therapeutic Exercise) flexion;bilateral;15 repititions;sitting  -     Knee Isometrics (Therapeutic Exercise) bilateral;quad sets;10 repetitions  -     Knee Strengthening (Therapeutic Exercise) bilateral;SLR (straight leg raise);SAQ (short arc quad);LAQ (long arc quad);heel slides;15 repititions  -     Row Name 12/13/22 1139          Ankle (Therapeutic Exercise)    Ankle (Therapeutic Exercise) AROM (active range of motion)  -     Ankle AROM (Therapeutic Exercise) bilateral;dorsiflexion;plantarflexion;15 repititions  -     Row Name 12/13/22 1139          Balance    Balance Assessment sitting static balance;sitting dynamic balance;sit to stand dynamic balance;standing static balance;standing dynamic balance  -     Static Sitting Balance modified independence  -     Dynamic Sitting Balance modified independence  -     Position, Sitting Balance unsupported;sitting edge of bed  -     Sit to Stand Dynamic Balance verbal cues;standby assist  VC for foot and hand placement  -     Static Standing Balance standby assist  -      Dynamic Standing Balance contact guard  -     Position/Device Used, Standing Balance supported;walker, rolling  -     Balance Interventions sit to stand;standing;sitting;supported;static;dynamic  -     Comment, Balance No LOB  -     Row Name 12/13/22 1139          General Lower Extremity Assessment (Range of Motion)    Lower Extremity: Range of Motion knee, right: LE ROM  -     Row Name 12/13/22 1139          Flexion, Right Knee (ROM)    AROM: Right Knee Flexion within functional limits  -     AROM Deficit: Right Knee Flexion (degrees) 61  -     Row Name 12/13/22 1139          Extension, Right Knee (ROM)    AROM: Right Knee Extension WNL (0 degrees)  -           User Key  (r) = Recorded By, (t) = Taken By, (c) = Cosigned By    Initials Name Provider Type     Susan Quesada PT Physical Therapist               Goals/Plan    No documentation.                Clinical Impression     Row Name 12/13/22 1147          Pain    Pretreatment Pain Rating 0/10 - no pain  -     Posttreatment Pain Rating 0/10 - no pain  -     Pain Location - Side/Orientation Right  -     Pain Location incisional  -     Pain Location - knee  -     Pre/Posttreatment Pain Comment pain increased w/ ambulation to 2/10 but resolved once returned to bed  -     Pain Intervention(s) Repositioned;Ambulation/increased activity  declined cold pack  -     Additional Documentation Pain Scale: Numbers Pre/Post-Treatment (Group)  -     Row Name 12/13/22 1147          Plan of Care Review    Plan of Care Reviewed With patient  -     Progress improving  -     Outcome Evaluation Pt was modified indp for bed mobility and min assist x1 for transfers, requiring mod VC for hand/foot placement and precautions throughout. He amb 140 ft w/ front wheeled walker, CGA x1 with chair follow and cueing for posture and avoid pivoting with turns. Mild pain (2/10) reported during ambulation but resolved when returned to bed. Pt tplerated ther  ex well but ambulation was limited by activity tolerance. Therapy to continue with ther ex progressions as tolerated.  -     Row Name 12/13/22 1147          Therapy Assessment/Plan (PT)    Rehab Potential (PT) good, to achieve stated therapy goals  -     Criteria for Skilled Interventions Met (PT) yes;skilled treatment is necessary;meets criteria  -     Therapy Frequency (PT) daily  -SS     Row Name 12/13/22 1147          Vital Signs    Pre Systolic BP Rehab 126  -SS     Pre Treatment Diastolic BP 63  -SS     Post Systolic BP Rehab 143  -SS     Post Treatment Diastolic BP 64  -SS     O2 Delivery Pre Treatment room air  -SS     O2 Delivery Intra Treatment room air  -SS     O2 Delivery Post Treatment room air  -SS     Pre Patient Position Supine  -SS     Intra Patient Position Standing  -SS     Post Patient Position Supine  -SS     Row Name 12/13/22 1147          Positioning and Restraints    Pre-Treatment Position in bed  -SS     Post Treatment Position bed  Pt declined to sit in chair due to posterior tissue breakdown  -SS     In Bed supine;call light within reach;fowlers;encouraged to call for assist;side rails up x2;exit alarm on  -           User Key  (r) = Recorded By, (t) = Taken By, (c) = Cosigned By    Initials Name Provider Type    SS Susan Quesada, PT Physical Therapist               Outcome Measures     Row Name 12/13/22 1155 12/13/22 0800       How much help from another person do you currently need...    Turning from your back to your side while in flat bed without using bedrails? 3  -SS 4  -TB    Moving from lying on back to sitting on the side of a flat bed without bedrails? 3  -SS 4  -TB    Moving to and from a bed to a chair (including a wheelchair)? 3  -SS 3  -TB    Standing up from a chair using your arms (e.g., wheelchair, bedside chair)? 3  -SS 3  -TB    Climbing 3-5 steps with a railing? 3  -SS 3  -TB    To walk in hospital room? 3  -SS 3  -TB    AM-PAC 6 Clicks Score (PT) 18  -SS 20   -TB    Highest level of mobility 6 --> Walked 10 steps or more  -SS 6 --> Walked 10 steps or more  -TB    Row Name 12/13/22 1326 12/13/22 1155       Functional Assessment    Outcome Measure Options AM-PAC 6 Clicks Basic Mobility (PT)  -SS AM-PAC 6 Clicks Basic Mobility (PT)  -SS          User Key  (r) = Recorded By, (t) = Taken By, (c) = Cosigned By    Initials Name Provider Type    TB Catracho Alberts RN Registered Nurse     Susan Quesada, PT Physical Therapist                             Physical Therapy Education     Title: PT OT SLP Therapies (Done)     Topic: Physical Therapy (Done)     Point: Mobility training (Done)     Learning Progress Summary           Patient Eager, E,TB, VU,DU by  at 12/13/2022 1156    Acceptance, E,D, VU,DU by  at 12/12/2022 0917    Acceptance, E, NR by AS at 12/10/2022 0943    Eager, E,H, VU,DU,NR by  at 12/9/2022 1620    Comment: Reviewed safety/technique with transfers, ambulation, HEP, PT POC    Eager, E, VU by SC at 12/8/2022 1418    Comment: reviewed benefits of activity    Eager, E, VU,NR by SC at 12/7/2022 1156    Comment: reviewed ROM exercise    Eager, E, VU,DU,NR by  at 12/6/2022 1637    Comment: Reviewed safety/technique with transfers, ambulation, HEP, PT POC    Eager, E, VU,DU,NR by  at 12/5/2022 1428    Comment: Reviewed safety/technique with bed mobility, transfers, ambulation, HEP, PT POC    Acceptance, E,TB, VU by  at 12/4/2022 1003    Acceptance, E,TB, VU by  at 12/3/2022 1137    Acceptance, E, NR by AS at 12/2/2022 1051    Eager, E, VU,DU,NR by  at 12/1/2022 0946    Comment: Educated pt. safety/technique with bed mobility, transfers, ambulation, WB status, use of knee immobilizer, PT POC                   Point: Home exercise program (Done)     Learning Progress Summary           Patient Eager, E,TB, VU,DU by  at 12/13/2022 1156    Acceptance, E,D, VU,DU by HP at 12/12/2022 0917    Acceptance, E, NR by AS at 12/10/2022 0943    AYNELY JuniorH,  VU,DU,NR by  at 12/9/2022 1620    Comment: Reviewed safety/technique with transfers, ambulation, HEP, PT POC    Eager, E, VU by SC at 12/8/2022 1418    Comment: reviewed benefits of activity    Eager, E, VU,NR by SC at 12/7/2022 1156    Comment: reviewed ROM exercise    Eager, E, VU,DU,NR by  at 12/6/2022 1637    Comment: Reviewed safety/technique with transfers, ambulation, HEP, PT POC    Eager, E, VU,DU,NR by  at 12/5/2022 1428    Comment: Reviewed safety/technique with bed mobility, transfers, ambulation, HEP, PT POC    Acceptance, E,TB, VU by  at 12/4/2022 1003    Acceptance, E,TB, VU by  at 12/3/2022 1137    Acceptance, E, NR by AS at 12/2/2022 1051    Eager, E, VU,DU,NR by  at 12/1/2022 0946    Comment: Educated pt. safety/technique with bed mobility, transfers, ambulation, WB status, use of knee immobilizer, PT POC                   Point: Body mechanics (Done)     Learning Progress Summary           Patient Eager, E,TB, VU,DU by  at 12/13/2022 1156    Acceptance, E,D, VU,DU by  at 12/12/2022 0917    Acceptance, E, NR by AS at 12/10/2022 0943    Eager, E,H, VU,DU,NR by  at 12/9/2022 1620    Comment: Reviewed safety/technique with transfers, ambulation, HEP, PT POC    Eager, E, VU by SC at 12/8/2022 1418    Comment: reviewed benefits of activity    Eager, E, VU,NR by SC at 12/7/2022 1156    Comment: reviewed ROM exercise    Eager, E, VU,DU,NR by  at 12/6/2022 1637    Comment: Reviewed safety/technique with transfers, ambulation, HEP, PT POC    Eager, E, VU,DU,NR by  at 12/5/2022 1428    Comment: Reviewed safety/technique with bed mobility, transfers, ambulation, HEP, PT POC    Acceptance, E,TB, VU by  at 12/4/2022 1003    Acceptance, E,TB, VU by JH at 12/3/2022 1137    Acceptance, E, NR by AS at 12/2/2022 1051    Sandi, YANELY, FLORECITA,DU,NR by SS at 12/1/2022 0946    Comment: Educated pt. safety/technique with bed mobility, transfers, ambulation, WB status, use of knee immobilizer, PT POC                    Point: Precautions (Done)     Learning Progress Summary           Patient Eager, E,TB, VU,DU by  at 12/13/2022 1156    Acceptance, E,D, VU,DU by  at 12/12/2022 0917    Acceptance, E, NR by AS at 12/10/2022 0943    Eager, E,H, VU,DU,NR by  at 12/9/2022 1620    Comment: Reviewed safety/technique with transfers, ambulation, HEP, PT POC    Eager, E, VU by SC at 12/8/2022 1418    Comment: reviewed benefits of activity    Eager, E, VU,NR by SC at 12/7/2022 1156    Comment: reviewed ROM exercise    Eager, E, VU,DU,NR by  at 12/6/2022 1637    Comment: Reviewed safety/technique with transfers, ambulation, HEP, PT POC    Eager, E, VU,DU,NR by  at 12/5/2022 1428    Comment: Reviewed safety/technique with bed mobility, transfers, ambulation, HEP, PT POC    Acceptance, E,TB, VU by  at 12/4/2022 1003    Acceptance, E,TB, VU by  at 12/3/2022 1137    Acceptance, E, NR by AS at 12/2/2022 1051    Eager, E, VU,DU,NR by  at 12/1/2022 0946    Comment: Educated pt. safety/technique with bed mobility, transfers, ambulation, WB status, use of knee immobilizer, PT POC                               User Key     Initials Effective Dates Name Provider Type Discipline    SC 06/16/21 -  Orion Reyes, PT Physical Therapist PT    AS 06/16/21 -  Ana Cristina Gutierrez, PTA Physical Therapist Assistant PT     09/22/20 -  Ehsan De La Cruz, PT Physical Therapist PT     06/01/21 -  Tonja Reyes, PT Physical Therapist PT     06/01/21 -  Susan Quesada, PT Physical Therapist PT              PT Recommendation and Plan  Planned Therapy Interventions (PT): balance training, bed mobility training, gait training, home exercise program, neuromuscular re-education, patient/family education, postural re-education, strengthening, stretching, transfer training  Plan of Care Reviewed With: patient  Progress: improving  Outcome Evaluation: Pt was modified indp for bed mobility and min assist x1 for transfers, requiring mod VC for  hand/foot placement and precautions throughout. He amb 140 ft w/ front wheeled walker, CGA x1 with chair follow and cueing for posture and avoid pivoting with turns. Mild pain (2/10) reported during ambulation but resolved when returned to bed. Pt tplerated ther ex well but ambulation was limited by activity tolerance. Therapy to continue with ther ex progressions as tolerated.     Time Calculation:    PT Charges     Row Name 12/13/22 1157             Time Calculation    Start Time 1023  -SS      Stop Time 1046  -SS      Time Calculation (min) 23 min  -SS         Time Calculation- PT    Total Timed Code Minutes- PT 23 minute(s)  -SS         Timed Charges    37009 - PT Therapeutic Exercise Minutes 10  -SS      64317 - Gait Training Minutes  13  -SS         Total Minutes    Timed Charges Total Minutes 23  -SS       Total Minutes 23  -SS            User Key  (r) = Recorded By, (t) = Taken By, (c) = Cosigned By    Initials Name Provider Type     Susan Quesada, PT Physical Therapist              Therapy Charges for Today     Code Description Service Date Service Provider Modifiers Qty    70333490398 HC PT THER PROC EA 15 MIN 12/13/2022 Susan Quesada, PT GP 1    50764289929 HC GAIT TRAINING EA 15 MIN 12/13/2022 Susan Quesada, PT GP 1          PT G-Codes  Outcome Measure Options: AM-PAC 6 Clicks Basic Mobility (PT)  AM-PAC 6 Clicks Score (PT): 18  AM-PAC 6 Clicks Score (OT): 16  PT Discharge Summary  Anticipated Discharge Disposition (PT): inpatient rehabilitation facility    Susan Quesada PT  12/13/2022

## 2022-12-13 NOTE — PROGRESS NOTES
"IM progress note      Melchor Hardwick III  [id number]  1965     LOS: 13 days     Attending: Brain Bentley MD    Primary Care Provider: Missy Up APRN      Chief Complaint/Reason for visit:  No chief complaint on file.      Subjective   Feels ok. No new complaints. Good pain control.     Objective        Visit Vitals  /63   Pulse 67   Temp 97.9 °F (36.6 °C) (Oral)   Resp 16   Ht 177.8 cm (70\")   Wt 88.9 kg (196 lb)   SpO2 96%   BMI 28.12 kg/m²     Temp (24hrs), Av.1 °F (36.7 °C), Min:97.9 °F (36.6 °C), Max:98.3 °F (36.8 °C)      Intake/Output:    Intake/Output Summary (Last 24 hours) at 2022 0911  Last data filed at 2022 0900  Gross per 24 hour   Intake 950 ml   Output 1450 ml   Net -500 ml         Physical Exam:     General Appearance:    Alert, cooperative, in no acute distress   Head:    Normocephalic, without obvious abnormality, atraumatic    Lungs:     Normal effort, symmetric chest rise,  clear to      auscultation bilaterally              Heart:    Regular rhythm and normal rate, normal S1 and S2    Abdomen:     Normal bowel sounds, mild distention.  Abdominal binder in place   Extremities: CDI optifoam over knee incision.   Flexion and dorsiflexion intact bilateral feet.   Pulses:   Pulses palpable and equal bilaterally   Skin:   No bleeding, bruising or rash          Results Review:     I reviewed the patient's new clinical results.   Results from last 7 days   Lab Units 22   WBC 10*3/mm3 2.80* 3.07* 3.39*   HEMOGLOBIN g/dL 7.5* 7.4* 7.5*   HEMATOCRIT % 24.4* 24.0* 24.0*   PLATELETS 10*3/mm3 82* 85* 79*     Results from last 7 days   Lab Units 228   SODIUM mmol/L 140 138 141   POTASSIUM mmol/L 4.1 4.3 4.2   CHLORIDE mmol/L 109* 108* 109*   CO2 mmol/L 25.0 26.0 27.0   BUN mg/dL 15 16 16   CREATININE mg/dL 0.72* 0.59* 0.61*   CALCIUM mg/dL 7.6* 7.8* 8.2*   BILIRUBIN mg/dL 0.4 0.4 0.5   ALK " PHOS U/L 257* 285* 271*   ALT (SGPT) U/L 29 36 41   AST (SGOT) U/L 48* 58* 72*   GLUCOSE mg/dL 169* 174* 67   TYRESE   •  Indication for TYRESE -to rule out infective endocarditis in a patient with MRSA bacteremia.  •  Findings-  •  Left ventricular systolic function is normal. Estimated left ventricular EF = 60%  •  Left atrial appendage is well visualized and is free of thrombus.  •  Saline test was negative for the evidence of shunt in interatrial septum.  •  The tricuspid valve appeared normal in structure. There was a moderate sized echodense structure seen above  the posterior tricuspid leaflet but not attached to the leaflet. The appearance and location are not typical for regurgitation. This structure could be a normal variant. No evidence of tricuspid valve stenosis or significant regurgitations.  •  Other valves also appear normal in structure with no evidence of stenosis or significant regurgitations.  No vegetations seen on these valves.  •  There is no evidence of pericardial effusion.  •  Comment-  •  In view of presence of MRSA bacteremia, recommend to extend antibiotic therapy for possible infective endocarditis and repeat TYRESE in 3 months.            Latest Reference Range & Units 12/11/22 03:58 12/11/22 07:45 12/11/22 10:57   Glucose 70 - 130 mg/dL 67 81 143 (H)   (H): Data is abnormally high    All medications reviewed.   acetaminophen, 1,000 mg, Oral, Q8H  aspirin, 81 mg, Oral, Q12H  ceftaroline, 600 mg, Intravenous, Q12H  insulin detemir, 20 Units, Subcutaneous, Q12H  insulin lispro, 0-7 Units, Subcutaneous, TID AC  Insulin Lispro, 7 Units, Subcutaneous, TID With Meals  levothyroxine, 25 mcg, Oral, Q AM  meloxicam, 15 mg, Oral, Daily  midodrine, 2.5 mg, Oral, TID AC  pantoprazole, 40 mg, Oral, Q AM  polyethylene glycol, 17 g, Oral, Daily  pregabalin, 150 mg, Oral, Q12H  sodium chloride, 10 mL, Intravenous, Q12H  vancomycin, 1,250 mg, Intravenous, Q12H      cyclobenzaprine, 5 mg, TID PRN  dextrose, 25  g, Q15 Min PRN  dextrose, 15 g, Q15 Min PRN  diphenhydrAMINE, 25 mg, Q6H PRN   Or  diphenhydrAMINE, 25 mg, Q6H PRN  glucagon (human recombinant), 1 mg, Q15 Min PRN  labetalol, 10 mg, Q4H PRN  naloxone, 0.1 mg, Q5 Min PRN  ondansetron, 4 mg, Q6H PRN   Or  ondansetron, 4 mg, Q6H PRN  Pharmacy to dose vancomycin, , Continuous PRN  sodium chloride, 500 mL, TID PRN  sodium chloride, 10 mL, PRN        Assessment & Plan       S/P right knee implant removal with insertion of antibiotic spacer    Type 2 diabetes mellitus with hyperglycemia, with long-term current use of insulin (HCC)    Infection of right knee (HCC)    Hypothyroid    MRSA sepsis    Chronic low back pain    PICC (peripherally inserted central catheter) in place    Pancytopenia    Plan  1. PT/OT, weightbearing per protocol.  2. Pain control-prns   3. IS-encouraged  4. DVT proph-mechanicals, aspirin.  5. Bowel regimen  6. Monitor post-op labs  7. DC planning,ongoing.  following     Lasix po x1 today 12/11/22    Hypothyroid  -Continue home Synthroid     DM  - hgb A1c on 11/28/2022 7.3  -Continue home bolus insulin with meals and long-acting insulin twice daily/ doses increased 12/1.   - Accu-Chek AC and HS with low dose SSI    TYRESE, done, noted, no obvious vegetation.    No infectious focus evident on MRIs.    Antibiotics per ID, Dr. Euceda is following   Discussed with  regarding blood counts, monitor for now. Consider changes to regimen.   Plan on tagged WBC scan       Rose Diaz MD  12/13/22  09:11 EST

## 2022-12-13 NOTE — PLAN OF CARE
Goal Outcome Evaluation:  Plan of Care Reviewed With: patient        Progress: improving  Outcome Evaluation: Pt was modified indp for bed mobility and min assist x1 for transfers, requiring mod VC for hand/foot placement and precautions throughout. He amb 140 ft w/ front wheeled walker, CGA x1 with chair follow and cueing for posture and avoid pivoting with turns. Mild pain (2/10) reported during ambulation but resolved when returned to bed. Pt tplerated ther ex well but ambulation was limited by activity tolerance. Therapy to continue with ther ex progressions as tolerated.

## 2022-12-13 NOTE — THERAPY PROGRESS REPORT/RE-CERT
Patient Name: Melchor Hardwick III  : 1965    MRN: [id number]                              Today's Date: 2022       Admit Date: 2022    Visit Dx:     ICD-10-CM ICD-9-CM   1. Infection of right knee (HCC)  M00.9 686.9     Patient Active Problem List   Diagnosis   • Hyperlipidemia   • Hypertensive disorder   • Obesity   • Type 2 diabetes mellitus with hyperglycemia, with long-term current use of insulin (HCC)   • Gas gangrene of foot (HCC)   • Cellulitis in diabetic foot (HCC)   • Other acute osteomyelitis of left foot (HCC)   • Osteomyelitis (HCC)   • Critical illness myopathy   • Staphylococcal arthritis of right knee (HCC)   • Other cirrhosis of liver (HCC)   • MRSA bacteremia   • Endocarditis of tricuspid valve   • Wound of right buttock   • History of osteomyelitis   • Debility   • Infection of right knee (HCC)   • S/P right knee implant removal with insertion of antibiotic spacer   • Hypothyroid   • MRSA sepsis   • Chronic low back pain   • PICC (peripherally inserted central catheter) in place     Past Medical History:   Diagnosis Date   • Diabetes mellitus (HCC)     4 times per day gets checked for sugar at rehab   • Dizzy    • Hyperlipidemia    • Hypertension    • Hypothyroid 2022   • MRSA infection     blood -      • Orthostatic hypotension    • Pressure sore on buttocks     top crack -  sees wound - but now rn take care of it at rehab - minor opening - dime size - pat nurse didnt assess-  pt states getting better   • Pyogenic arthritis of right knee joint, due to unspecified organism (HCC) 2022   • Sepsis (HCC)    • Wears glasses     readers     Past Surgical History:   Procedure Laterality Date   • ABSCESS DRAINAGE  2004    PERINEUM   • AMPUTATION FOOT Left 2022    Procedure: AMPUTATION FOOT;  Surgeon: Addison Colby MD;  Location: [facility];  Service: Podiatry;  Laterality: Left;   • INCISION AND DRAINAGE LEG Left 05/10/2022    Procedure: INCISION AND DRAINAGE  LOWER EXTREMITY;  Surgeon: Addison Colby MD;  Location:  [facility];  Service: Podiatry;  Laterality: Left;   • KNEE INCISION AND DRAINAGE Right 09/21/2022    Procedure: KNEE INCISION AND DRAINAGE RIGHT;  Surgeon: Brain Bentley MD;  Location:  [facility];  Service: Orthopedics;  Laterality: Right;   • PERIPHERALLY INSERTED CENTRAL CATHETER INSERTION Left    • TOTAL KNEE  PROSTHESIS REMOVAL W/ SPACER INSERTION Right 11/30/2022    Procedure: ANTIBIOTIC SPACER PLACEMENT KNEE - RIGHT;  Surgeon: Brain Bentley MD;  Location:  [facility];  Service: Orthopedics;  Laterality: Right;   • WOUND DEBRIDEMENT Left 05/13/2022    Procedure: DEBRIDEMENT FOOT;  Surgeon: Addison Colby MD;  Location:  [facility];  Service: Podiatry;  Laterality: Left;      General Information     Row Name 12/13/22 1439          OT Time and Intention    Document Type progress note/recertification  -JY     Mode of Treatment occupational therapy;individual therapy  -JY     Row Name 12/13/22 1439          General Information    Patient Profile Reviewed yes  -JY     Existing Precautions/Restrictions fall;orthostatic hypotension;other (see comments)  PICC, walking boot per L met amputation, abdominal binder per orthostatic hypotension, medial coccyx pressure injury  -JY     Barriers to Rehab medically complex;previous functional deficit;physical barrier  -JY     Row Name 12/13/22 1439          Cognition    Orientation Status (Cognition) oriented x 4  -JY     Row Name 12/13/22 1439          Safety Issues, Functional Mobility    Safety Issues Affecting Function (Mobility) friction/shear risk  -JY     Impairments Affecting Function (Mobility) pain;range of motion (ROM);balance;endurance/activity tolerance  -JY     Comment, Safety Issues/Impairments (Mobility) pt alert and able to follow commands; cognizant of safety measures needed in place prior to progressive mobility  -JY           User Key  (r) = Recorded By, (t) = Taken By, (c) = Cosigned By     Initials Name Provider Type    Jaja Morales, OT Occupational Therapist                 Mobility/ADL's     Row Name 12/13/22 1443          Bed Mobility    Bed Mobility supine-sit;sit-supine;scooting/bridging;rolling right  -JY     Rolling Left [place] (Bed Mobility) modified independence  -JY     Rolling Right [place] (Bed Mobility) modified independence  -JY     Scooting/Bridging [place] (Bed Mobility) modified independence  -JY     Supine-Sit [place] (Bed Mobility) modified independence  -JY     Sit-Supine [place] (Bed Mobility) modified independence  -JY     Assistive Device (Bed Mobility) bed rails;head of bed elevated  -ALTHEA     Comment, (Bed Mobility) pt req'd gross MI for bed mobility including rolling R prior to supine > sitting and for placement of pillows to offload pressure at posterior body; utilized bed rail for pulling self to side and pulling self up toward HOB for improved sitting up posture, able to return demo advancing LEs to EOB and uprighting trunk into sitting and reverse seq for return to bed; reported mild dizziness at EOB, quickly resolved at EOB with sitting  -ALTHEA     Row Name 12/13/22 1443          Transfers    Transfers sit-stand transfer;stand-sit transfer;toilet transfer  -JSISSY     Comment, (Transfers) skilled cues for optimal hand placement specifically to push up from seated surface vs pulling/pushing at FWW to stand and reaching back prior to sitting with movement of RLE forward as needed for comfort; pt deferred wearing abdominal binder and walking boot at L foot when offered for STS at EOB; pt req'd CGA for safety/A in STS at EOB  -ALTHEA     Row Name 12/13/22 1443          Sit-Stand Transfer    Sit-Stand [place] (Transfers) contact guard;verbal cues  -ALTHEA     Assistive Device (Sit-Stand Transfers) walker, front-wheeled  -ALTHEA     Row Name 12/13/22 1443          Stand-Sit Transfer    Stand-Sit [place] (Transfers) contact guard;verbal cues  -ALTHEA      Assistive Device (Stand-Sit Transfers) walker, front-wheeled  -[facility] Name 12/13/22 1443          Toilet Transfer    Comment, (Toilet Transfer) did not assist pt in toilet t/f this date however educated pt on current skill set supporting  t/f to toilet vs BSC or bedpan if voiding urgency does not limit pt; pt requires increased prep time to don walking boot, abdominal binder  -[facility] Name 12/13/22 [facility]          Functional Mobility    Functional Mobility-Distance (Feet) --  lateral stepping to HOB for improved position  -J     Functional Mobility- Comment refer to PT for specifics regarding fxl mobility however during fxl lateral stepping toward HOB pt req'd gross CGA with FWW support; pt dependent for donning walking boot to L LE  -[facility] Name 12/13/22 [facility]          Activities of Daily Living    BADL Assessment/Intervention lower body dressing;upper body dressing;grooming  -[facility] Name 12/13/22 [facility]          Mobility    Extremity Weight-bearing Status left lower extremity;right lower extremity  -JY     Left Lower Extremity (Weight-bearing Status) weight-bearing as tolerated (WBAT)  -JY     Right Lower Extremity (Weight-bearing Status) weight-bearing as tolerated (WBAT)  -[facility] Name 12/13/22 1443          Upper Body Dressing Assessment/Training    [place] Level (Upper Body Dressing) doff;don;pajama/robe;minimum assist (75% patient effort);verbal cues  -JY     Position (Upper Body Dressing) unsupported sitting;edge of bed sitting  -JY     Comment, (Upper Body Dressing) min A for posterior reach for tying and untying gown and situational A at RUE d/t PICC  -     Row Name 12/13/22 1443          Lower Body Dressing Assessment/Training    [place] Level (Lower Body Dressing) doff;don;other (see comments);socks;contact guard assist;minimum assist (75% patient effort)  walking boot to LLE  -JY     Position (Lower Body Dressing) unsupported sitting;edge of bed sitting  -JY     Comment, (Lower  Body Dressing) reviewed with pt use of LH reacher and sock aid to d/d socks with improved performance with use of AE, pt able to elevate LLE up to figure 4 position for d/d w/o AE however req'd AE at RLE d/t limited distal reach and increased pain with reach  -JY     Row Name 12/13/22 1443          Grooming Assessment/Training    [place] Level (Grooming) wash face, hands;set up  -JY           User Key  (r) = Recorded By, (t) = Taken By, (c) = Cosigned By    Initials Name Provider Type    Jaja Morales OT Occupational Therapist               Obj/Interventions     Row Name 12/13/22 1458          Strength Comprehensive (MMT)    General Manual Muscle Testing (MMT) Assessment upper extremity strength deficits identified  -JY     Comment, General Manual Muscle Testing (MMT) Assessment BUE MMS rossly 4+/5 per MMT; pt functionally using to assist with LE deficits  -JY     Row Name 12/13/22 1458          Balance    Balance Assessment sitting static balance;sitting dynamic balance;standing static balance;standing dynamic balance  -JY     Static Sitting Balance modified independence  -JY     Dynamic Sitting Balance supervision;other (see comments)  during LBD  -JY     Position, Sitting Balance unsupported;sitting edge of bed  -JY     Static Standing Balance standby assist  -JY     Dynamic Standing Balance contact guard  -JY     Position/Device Used, Standing Balance supported;walker, front-wheeled  -JY     Balance Interventions sitting;standing;static;dynamic;sit to stand;supported;occupation based/functional task  -JY     Comment, Balance no overt LOB, used FWW throughout all standing  -JY           User Key  (r) = Recorded By, (t) = Taken By, (c) = Cosigned By    Initials Name Provider Type    Jaja Morales OT Occupational Therapist               Goals/Plan     Row Name 12/13/22 1509          Transfer Goal 1 (OT)    Activity/Assistive Device (Transfer Goal 1, OT)  sit-to-stand/stand-to-sit;bed-to-chair/chair-to-bed;commode, bedside without drop arms;walker, rolling;other (see comments)  -JY     [place] Level/Cues Needed (Transfer Goal 1, OT) standby assist;contact guard required;verbal cues required  -JY     Time Frame (Transfer Goal 1, OT) long term goal (LTG);by discharge  -JY     Progress/Outcome (Transfer Goal 1, OT) good progress toward goal  -JY     Row Name 12/13/22 1509          Dressing Goal 1 (OT)    Activity/Device (Dressing Goal 1, OT) lower body dressing;other (see comments)  d/d LB garments with AE PRN  -JY     [place]/Cues Needed (Dressing Goal 1, OT) contact guard required;verbal cues required  -JY     Time Frame (Dressing Goal 1, OT) long term goal (LTG);by discharge  -JY     Progress/Outcome (Dressing Goal 1, OT) good progress toward goal;goal revised this date  -JY     Row Name 12/13/22 1509          Toileting Goal 1 (OT)    Activity/Device (Toileting Goal 1, OT) adjust/manage clothing;perform perineal hygiene;grab bar/safety frame;commode;commode, bedside without drop arms;raised toilet seat  -JY     [place] Level/Cues Needed (Toileting Goal 1, OT) moderate assist (50-74% patient effort);verbal cues required  -JY     Time Frame (Toileting Goal 1, OT) long term goal (LTG);by discharge  -JY     Progress/Outcome (Toileting Goal 1, OT) goal ongoing  -JY     Row Name 12/13/22 1509          Strength Goal 1 (OT)    Time Frame (Strength Goal 1, OT) long term goal (LTG);by discharge  -JY     Progress/Outcome (Strength Goal 1, OT) good progress toward goal  -JY     Row Name 12/13/22 1509          Therapy Assessment/Plan (OT)    Planned Therapy Interventions (OT) activity tolerance training;adaptive equipment training;BADL retraining;functional balance retraining;occupation/activity based interventions;patient/caregiver education/training;ROM/therapeutic exercise;strengthening exercise;transfer/mobility retraining  -JY           User Key  (r) =  "Recorded By, (t) = Taken By, (c) = Cosigned By    Initials Name Provider Type    Jaja Morales, PAYAM Occupational Therapist               Clinical Impression     Row Name 12/13/22 1501          Pain Assessment    Pretreatment Pain Rating 0/10 - no pain  -JY     Posttreatment Pain Rating 0/10 - no pain  -JY     Pre/Posttreatment Pain Comment denies any pain and tolerated all OT interventions; pt reported increase in pain to 2-3/10 during standing  -JY     Pain Intervention(s) Repositioned;Ambulation/increased activity  -JY     Row Name 12/13/22 1501          Plan of Care Review    Plan of Care Reviewed With patient  -JY     Progress improving  -JY     Outcome Evaluation OT recert completed this date. Pt demonstrates progress in LBD with use of AE, improved integration of BUE MMS to complete ADLs, related t/fs given LE deficits and skill set for improved toileting if voiding urgency does not limit pt. Pt grossly requires MI for bed mobility with HOB elevated and bed rails used, CGA this date for STS t/f and lateral stepping at EOB for improved position toward HOB for improved sitting up in bed. Pt deferred use of L walking boot for STS, req'd dep A for donning prior to lateral stepping. Pt req'd min A for d/d gown and improved ability to d/d socks with CGA to min A with use of AE. Educated pt on skill set that supports mobility to bathroom when supports in place for upgraded toileting if urgency is not limiting. Updated goals as needed for \"just right challenge\". Recommend further rehab at d/c when medically ready.  -JY     Row Name 12/13/22 1501          Therapy Assessment/Plan (OT)    Rehab Potential (OT) good, to achieve stated therapy goals  -JY     Criteria for Skilled Therapeutic Interventions Met (OT) yes;skilled treatment is necessary  -JY     Therapy Frequency (OT) daily  -JY     Row Name 12/13/22 1501          Vital Signs    Pre Systolic BP Rehab 118  -JY     Pre Treatment Diastolic BP 60  -JY     " Pretreatment Heart Rate (beats/min) 69  -JY     Posttreatment Heart Rate (beats/min) 72  -JY     Pre SpO2 (%) 97  -JY     O2 Delivery Intra Treatment room air  -JY     Post SpO2 (%) 99  -JY     O2 Delivery Post Treatment room air  -JY     Pre Patient Position Supine  -JY     Intra Patient Position Standing  -JY     Post Patient Position Supine  sidelying to R side  -JY     Row Name 12/13/22 1501          Positioning and Restraints    Pre-Treatment Position in bed  -JY     Post Treatment Position bed  pt declines to sit in chair d/t skin integrity at posterior body and prefers routine bed position changes  -JY     In Bed side lying right;call light within reach;encouraged to call for assist;notified nsg;exit alarm on;side rails up x3  -JY           User Key  (r) = Recorded By, (t) = Taken By, (c) = Cosigned By    Initials Name Provider Type    Jaja Morales, PAYAM Occupational Therapist               Outcome Measures     Row Name 12/13/22 1511          How much help from another is currently needed...    Putting on and taking off regular lower body clothing? 3  -JY     Bathing (including washing, rinsing, and drying) 2  -JY     Toileting (which includes using toilet bed pan or urinal) 2  -JY     Putting on and taking off regular upper body clothing 3  -JY     Taking care of personal grooming (such as brushing teeth) 3  -JY     Eating meals 4  -JY     AM-PAC 6 Clicks Score (OT) 17  -JY     Row Name 12/13/22 1155 12/13/22 0800       How much help from another person do you currently need...    Turning from your back to your side while in flat bed without using bedrails? 3  -SS 4  -TB    Moving from lying on back to sitting on the side of a flat bed without bedrails? 3  -SS 4  -TB    Moving to and from a bed to a chair (including a wheelchair)? 3  -SS 3  -TB    Standing up from a chair using your arms (e.g., wheelchair, bedside chair)? 3  -SS 3  -TB    Climbing 3-5 steps with a railing? 3  -SS 3  -TB    To walk in  hospital room? 3  -SS 3  -TB    AM-PAC 6 Clicks Score (PT) 18  -SS 20  -TB    Highest level of mobility 6 --> Walked 10 steps or more  -SS 6 --> Walked 10 steps or more  -TB    Row Name 12/13/22 1511 12/13/22 1326       Functional Assessment    Outcome Measure Options AM-PAC 6 Clicks Daily Activity (OT)  -JY AM-PAC 6 Clicks Basic Mobility (PT)  -SS    Row Name 12/13/22 1155          Functional Assessment    Outcome Measure Options AM-PAC 6 Clicks Basic Mobility (PT)  -SS           User Key  (r) = Recorded By, (t) = Taken By, (c) = Cosigned By    Initials Name Provider Type    TB Catracho Alberts, RN Registered Nurse    Jaja Morales, OT Occupational Therapist    SS Susan Quesada, PT Physical Therapist                Occupational Therapy Education     Title: PT OT SLP Therapies (In Progress)     Topic: Occupational Therapy (In Progress)     Point: ADL training (In Progress)     Description:   Instruct learner(s) on proper safety adaptation and remediation techniques during self care or transfers.   Instruct in proper use of assistive devices.              Learning Progress Summary           Patient Acceptance, D,E, NR by ALTHEA at 12/13/2022 1413    Eager, E, VU,NR by SC at 12/7/2022 1156    Comment: reviewed ROM exercise    Acceptance, TB,E,D, VU,NR by  at 12/7/2022 1045    Acceptance, E,D, NR by ALTHEA at 12/2/2022 0751                   Point: Home exercise program (Done)     Description:   Instruct learner(s) on appropriate technique for monitoring, assisting and/or progressing therapeutic exercises/activities.              Learning Progress Summary           Patient Eager, E, VU,NR by SC at 12/7/2022 1156    Comment: reviewed ROM exercise                   Point: Precautions (In Progress)     Description:   Instruct learner(s) on prescribed precautions during self-care and functional transfers.              Learning Progress Summary           Patient Acceptance, D,E, NR by ALTHEA at 12/13/2022 1413    Eager, E, VU,NR  by SC at 12/7/2022 1156    Comment: reviewed ROM exercise    Acceptance, TB,E,D, VU,NR by  at 12/7/2022 1045    Acceptance, E,D, NR by J at 12/2/2022 0751                   Point: Body mechanics (In Progress)     Description:   Instruct learner(s) on proper positioning and spine alignment during self-care, functional mobility activities and/or exercises.              Learning Progress Summary           Patient Acceptance, D,E, NR by JY at 12/13/2022 1413    Eager, E, VU,NR by SC at 12/7/2022 1156    Comment: reviewed ROM exercise    Acceptance, TB,E,D, VU,NR by  at 12/7/2022 1045    Acceptance, E,D, NR by J at 12/2/2022 0751                               User Key     Initials Effective Dates Name Provider Type Discipline    SC 06/16/21 -  Orion Reyes, PT Physical Therapist PT     10/25/22 -  Tonja Amato, OT Occupational Therapist OT    ALTHEA 06/16/21 -  Jaja Suresh, OT Occupational Therapist OT              OT Recommendation and Plan  Planned Therapy Interventions (OT): activity tolerance training, adaptive equipment training, BADL retraining, functional balance retraining, occupation/activity based interventions, patient/caregiver education/training, ROM/therapeutic exercise, strengthening exercise, transfer/mobility retraining  Therapy Frequency (OT): daily  Plan of Care Review  Plan of Care Reviewed With: patient  Progress: improving  Outcome Evaluation: OT recert completed this date. Pt demonstrates progress in LBD with use of AE, improved integration of BUE MMS to complete ADLs, related t/fs given LE deficits and skill set for improved toileting if voiding urgency does not limit pt. Pt grossly requires MI for bed mobility with HOB elevated and bed rails used, CGA this date for STS t/f and lateral stepping at EOB for improved position toward HOB for improved sitting up in bed. Pt deferred use of L walking boot for STS, req'd dep A for donning prior to lateral stepping. Pt req'd min A for d/d  "gown and improved ability to d/d socks with CGA to min A with use of AE. Educated pt on skill set that supports mobility to bathroom when supports in place for upgraded toileting if urgency is not limiting. Updated goals as needed for \"just right challenge\". Recommend further rehab at d/c when medically ready.     Time Calculation:    Time Calculation- OT     Row Name 12/13/22 1512 12/13/22 1157          Time Calculation- OT    OT Start Time 1413  -JY --     OT Received On 12/13/22  -JY --     OT Goal Re-Cert Due Date 12/23/22  -JY --        Timed Charges    46702 - Gait Training Minutes  -- 13  -SS     69446 - OT Therapeutic Activity Minutes 10  -JY --     79906 - OT Self Care/Mgmt Minutes 15  -JY --        Total Minutes    Timed Charges Total Minutes 25  -JY 13  -SS      Total Minutes 25  -JY 13  -SS           User Key  (r) = Recorded By, (t) = Taken By, (c) = Cosigned By    Initials Name Provider Type    Jaja Morales OT Occupational Therapist    SS Susan Quesada, PT Physical Therapist              Therapy Charges for Today     Code Description Service Date Service Provider Modifiers Qty    15219778172 HC OT THERAPEUTIC ACT EA 15 MIN 12/13/2022 Jaja Suresh OT GO 1    89222079379 HC OT SELF CARE/MGMT/TRAIN EA 15 MIN 12/13/2022 Jaja Suresh OT GO 1               Jaja Suresh OT  12/13/2022  "

## 2022-12-13 NOTE — CASE MANAGEMENT/SOCIAL WORK
Continued Stay Note  [facility]     Patient Name: Melchor Hardwick III  MRN: [id number]  Today's Date: 12/13/2022    Admit Date: 11/30/2022    Plan: [facility]   Discharge Plan     Row Name 12/13/22 1407       Plan    Plan [facility]    Patient/Family in Agreement with Plan yes    Plan Comments [facility] Mr. Hardwick a bed and have started insurance precert. Will await this approval. Case management will continue to follow.    Final Discharge Disposition Code 63 - [facility]               Discharge Codes    No documentation.               Expected Discharge Date and Time     Expected Discharge Date Expected Discharge Time    Dec 14, 2022             Gavin Collazo RN

## 2022-12-13 NOTE — PLAN OF CARE
Goal Outcome Evaluation:   VSS. Alert and oriented x 4. 0/10 pain. Right knee optifoam CDI. No new numbness or tingling noted. Wound care consulted for coccyx wound, Venelex ordered. CM pre-cert started for [facility].

## 2022-12-14 LAB
GLUCOSE BLDC GLUCOMTR-MCNC: 101 MG/DL (ref 70–130)
GLUCOSE BLDC GLUCOMTR-MCNC: 118 MG/DL (ref 70–130)
GLUCOSE BLDC GLUCOMTR-MCNC: 127 MG/DL (ref 70–130)
GLUCOSE BLDC GLUCOMTR-MCNC: 136 MG/DL (ref 70–130)
SARS-COV-2 RDRP RESP QL NAA+PROBE: NORMAL
VANCOMYCIN SERPL-MCNC: 18 MCG/ML (ref 5–40)

## 2022-12-14 PROCEDURE — 25010000002 CEFTAROLINE FOSAMIL PER 10 MG: Performed by: INTERNAL MEDICINE

## 2022-12-14 PROCEDURE — 80202 ASSAY OF VANCOMYCIN: CPT

## 2022-12-14 PROCEDURE — 63710000001 INSULIN LISPRO (HUMAN) PER 5 UNITS: Performed by: INTERNAL MEDICINE

## 2022-12-14 PROCEDURE — 87635 SARS-COV-2 COVID-19 AMP PRB: CPT | Performed by: INTERNAL MEDICINE

## 2022-12-14 PROCEDURE — 97530 THERAPEUTIC ACTIVITIES: CPT

## 2022-12-14 PROCEDURE — 97116 GAIT TRAINING THERAPY: CPT

## 2022-12-14 PROCEDURE — 82962 GLUCOSE BLOOD TEST: CPT

## 2022-12-14 PROCEDURE — 25010000002 VANCOMYCIN 10 G RECONSTITUTED SOLUTION

## 2022-12-14 PROCEDURE — 63710000001 INSULIN DETEMIR PER 5 UNITS: Performed by: INTERNAL MEDICINE

## 2022-12-14 RX ORDER — FUROSEMIDE 40 MG/1
40 TABLET ORAL ONCE
Status: COMPLETED | OUTPATIENT
Start: 2022-12-14 | End: 2022-12-14

## 2022-12-14 RX ADMIN — Medication 10 ML: at 21:11

## 2022-12-14 RX ADMIN — PREGABALIN 150 MG: 150 CAPSULE ORAL at 12:01

## 2022-12-14 RX ADMIN — ASPIRIN 81 MG: 81 TABLET, COATED ORAL at 21:03

## 2022-12-14 RX ADMIN — CEFTAROLINE FOSAMIL 600 MG: 600 POWDER, FOR SOLUTION INTRAVENOUS at 08:55

## 2022-12-14 RX ADMIN — LEVOTHYROXINE SODIUM 25 MCG: 25 TABLET ORAL at 05:48

## 2022-12-14 RX ADMIN — MIDODRINE HYDROCHLORIDE 2.5 MG: 5 TABLET ORAL at 08:58

## 2022-12-14 RX ADMIN — FUROSEMIDE 40 MG: 40 TABLET ORAL at 13:10

## 2022-12-14 RX ADMIN — INSULIN DETEMIR 20 UNITS: 100 INJECTION, SOLUTION SUBCUTANEOUS at 21:03

## 2022-12-14 RX ADMIN — INSULIN DETEMIR 20 UNITS: 100 INJECTION, SOLUTION SUBCUTANEOUS at 08:57

## 2022-12-14 RX ADMIN — ACETAMINOPHEN 1000 MG: 500 TABLET, FILM COATED ORAL at 13:10

## 2022-12-14 RX ADMIN — PREGABALIN 150 MG: 150 CAPSULE ORAL at 21:03

## 2022-12-14 RX ADMIN — VANCOMYCIN HYDROCHLORIDE 1250 MG: 10 INJECTION, POWDER, LYOPHILIZED, FOR SOLUTION INTRAVENOUS at 13:10

## 2022-12-14 RX ADMIN — Medication 10 ML: at 09:01

## 2022-12-14 RX ADMIN — PANTOPRAZOLE SODIUM 40 MG: 40 TABLET, DELAYED RELEASE ORAL at 05:48

## 2022-12-14 RX ADMIN — MELOXICAM 15 MG: 15 TABLET ORAL at 09:00

## 2022-12-14 RX ADMIN — VANCOMYCIN HYDROCHLORIDE 1250 MG: 10 INJECTION, POWDER, LYOPHILIZED, FOR SOLUTION INTRAVENOUS at 23:52

## 2022-12-14 RX ADMIN — INSULIN LISPRO 7 UNITS: 100 INJECTION, SOLUTION INTRAVENOUS; SUBCUTANEOUS at 12:01

## 2022-12-14 RX ADMIN — ACETAMINOPHEN 1000 MG: 500 TABLET, FILM COATED ORAL at 05:48

## 2022-12-14 RX ADMIN — ACETAMINOPHEN 1000 MG: 500 TABLET, FILM COATED ORAL at 21:03

## 2022-12-14 RX ADMIN — INSULIN LISPRO 7 UNITS: 100 INJECTION, SOLUTION INTRAVENOUS; SUBCUTANEOUS at 08:56

## 2022-12-14 RX ADMIN — CEFTAROLINE FOSAMIL 600 MG: 600 POWDER, FOR SOLUTION INTRAVENOUS at 21:07

## 2022-12-14 RX ADMIN — CASTOR OIL AND BALSAM, PERU 1 APPLICATION: 788; 87 OINTMENT TOPICAL at 21:04

## 2022-12-14 RX ADMIN — INSULIN LISPRO 7 UNITS: 100 INJECTION, SOLUTION INTRAVENOUS; SUBCUTANEOUS at 17:15

## 2022-12-14 RX ADMIN — ASPIRIN 81 MG: 81 TABLET, COATED ORAL at 08:58

## 2022-12-14 NOTE — PROGRESS NOTES
"Pharmacy Consult-Vancomycin Dosing  Melchor Hardwick III is a  57 y.o. male receiving vancomycin therapy.     Indication: Bacteremia, bone/joint infection, endocarditis  Consulting Provider: Dr Euceda  ID Consult: Yes    Goal AUC: 400 - 600 mg/L*hr    Current Antimicrobial Therapy  Vancomycin - pharmacy dosing  Ceftaroline 600mg IV q12h    Allergies  Allergies as of 11/23/2022    (No Known Allergies)       Labs    Results from last 7 days   Lab Units 12/13/22  0452 12/12/22 0347 12/11/22  0358   BUN mg/dL 15 16 16   CREATININE mg/dL 0.72* 0.59* 0.61*       Results from last 7 days   Lab Units 12/13/22  0452 12/12/22 0347 12/11/22 0358   WBC 10*3/mm3 2.80* 3.07* 3.39*         Evaluation of Dosing     Last Dose Received in the ED/Outside Facility: 1250mg received as ppx on 11/30 @0902  Is Patient on Dialysis or Renal Replacement: no    Ht - 177.8 cm (70\")  Wt - 88.9 kg (196 lb)    Estimated Creatinine Clearance: 127.1 mL/min (A) (by C-G formula based on SCr of 0.72 mg/dL (L)).    Intake & Output (last 3 days)         12/11 0701  12/12 0700 12/12 0701  12/13 0700 12/13 0701  12/14 0700 12/14 0701  12/15 0700    P.O. 1160 600 600 200    IV Piggyback   350     Total Intake(mL/kg) 1160 (13) 600 (6.7) 950 (10.7) 200 (2.2)    Urine (mL/kg/hr) 1375 (0.6) 850 (0.4) 1600 (0.7) 1400 (4.2)    Stool  0 0     Total Output 8684 926 7749 1400    Net -215 -250 -650 -1200            Stool Unmeasured Occurrence  1 x 1 x             Microbiology and Radiology  Microbiology Results (last 10 days)       ** No results found for the last 240 hours. **            Reported Vancomycin Levels    Results from last 7 days   Lab Units 12/14/22  0905 12/11/22  0358   VANCOMYCIN RM mcg/mL 18.00 35.20             [employer] AUC Calculation:    Current AUC:   539 mg/L*hr    Predicted Steady State AUC on Current Dose: 537 mg/L*hr  _________________________________    Predicted Steady State AUC on New Dose:   537 mg/L*hr    Assessment/Plan:    1. " Pharmacy to dose vancomycin for bacteremia. Goal AUC: 400-600 mg/L*hr.   2. Patient currently on a maintenance regimen of vancomycin 1250 mg IV q12h. (~14 mg/kg)  3. Vancomycin random level on 12/14 was 18.0 mg/L. Results in therapeutic AUC. No evidence of nephrotoxicity at this point (Scr and UOP remain stable).   4. Patient has been fairly stable on this dose for ~2 weeks. Will plan for next level to be checked 12/19.   5. Monitor renal function, cultures and sensitivities, and clinical status, and adjust regimen as necessary.  Pharmacy will continue to follow.      Erlinda Argueta, YuliD Candidate  12/14/22  10:43 EST

## 2022-12-14 NOTE — CASE MANAGEMENT/SOCIAL WORK
Continued Stay Note  [facility]     Patient Name: Melchor Hardwick III  MRN: [id number]  Today's Date: 12/14/2022    Admit Date: 11/30/2022    Plan: [facility]   Discharge Plan     Row Name 12/14/22 1603       Plan    Plan [facility]    Patient/Family in Agreement with Plan yes    Plan Comments Insurance has approved Mr. Hardwick for a bed at [facility]. He will be transported there via [employer] tomorrow at 1100 with a wheelchair. COVID test will be needed and has been ordered. Case management will continue to follow.    Final Discharge Disposition Code 63 - LT               Discharge Codes    No documentation.               Expected Discharge Date and Time     Expected Discharge Date Expected Discharge Time    Dec 15, 2022             Gavin Collazo RN

## 2022-12-14 NOTE — PLAN OF CARE
Goal Outcome Evaluation:  Plan of Care Reviewed With: patient           Outcome Evaluation: Pt continues with decreased activity tolerance, balance deficits, decreased functional strength, and pain limiting mobility. Pt performed bed mobility mod-I, STS CGA, and ambulated 124' CGA w/ a RW demonstrating a forward flexed posture and slow, antalgic gait pattern. Continue PT POC.

## 2022-12-14 NOTE — PLAN OF CARE
Goal Outcome Evaluation:     Problem: Diabetes Comorbidity  Goal: Blood Glucose Level Within Targeted Range  Outcome: Ongoing, Progressing  Intervention: Monitor and Manage Glycemia  Recent Flowsheet Documentation  Taken 12/14/2022 5297 by Jessica King RN  Glycemic Management: blood glucose monitored

## 2022-12-14 NOTE — PROGRESS NOTES
"IM progress note      Melchor Hardwick III  [id number]  1965     LOS: 14 days     Attending: Brain Bentley MD    Primary Care Provider: Missy Up APRN      Chief Complaint/Reason for visit:  No chief complaint on file.      Subjective   Doing well. Denies pain. Sutures out today. Denies f/c/n/v/sob/cp.    Objective        Visit Vitals  /73   Pulse 67   Temp 98 °F (36.7 °C) (Oral)   Resp 17   Ht 177.8 cm (70\")   Wt 88.9 kg (196 lb)   SpO2 96%   BMI 28.12 kg/m²     Temp (24hrs), Av °F (36.7 °C), Min:97.8 °F (36.6 °C), Max:98.2 °F (36.8 °C)      Intake/Output:    Intake/Output Summary (Last 24 hours) at 2022 1237  Last data filed at 2022 0901  Gross per 24 hour   Intake 550 ml   Output 2400 ml   Net -1850 ml         Physical Exam:     General Appearance:    Alert, cooperative, in no acute distress   Head:    Normocephalic, without obvious abnormality, atraumatic    Lungs:     Normal effort, symmetric chest rise,  clear to      auscultation bilaterally              Heart:    Regular rhythm and normal rate, normal S1 and S2    Abdomen:     Normal bowel sounds, mild distention.  Abdominal binder in place   Extremities: CDI knee incision.   Flexion and dorsiflexion intact bilateral feet.   Pulses:   Pulses palpable and equal bilaterally   Skin:   No bleeding, bruising or rash          Results Review:     I reviewed the patient's new clinical results.   Results from last 7 days   Lab Units 22  035   WBC 10*3/mm3 2.80* 3.07* 3.39*   HEMOGLOBIN g/dL 7.5* 7.4* 7.5*   HEMATOCRIT % 24.4* 24.0* 24.0*   PLATELETS 10*3/mm3 82* 85* 79*     Results from last 7 days   Lab Units 22  0358   SODIUM mmol/L 140 138 141   POTASSIUM mmol/L 4.1 4.3 4.2   CHLORIDE mmol/L 109* 108* 109*   CO2 mmol/L 25.0 26.0 27.0   BUN mg/dL 15 16 16   CREATININE mg/dL 0.72* 0.59* 0.61*   CALCIUM mg/dL 7.6* 7.8* 8.2*   BILIRUBIN mg/dL 0.4 0.4 0.5   ALK " PHOS U/L 257* 285* 271*   ALT (SGPT) U/L 29 36 41   AST (SGOT) U/L 48* 58* 72*   GLUCOSE mg/dL 169* 174* 67   TYRESE   •  Indication for TYRESE -to rule out infective endocarditis in a patient with MRSA bacteremia.  •  Findings-  •  Left ventricular systolic function is normal. Estimated left ventricular EF = 60%  •  Left atrial appendage is well visualized and is free of thrombus.  •  Saline test was negative for the evidence of shunt in interatrial septum.  •  The tricuspid valve appeared normal in structure. There was a moderate sized echodense structure seen above  the posterior tricuspid leaflet but not attached to the leaflet. The appearance and location are not typical for regurgitation. This structure could be a normal variant. No evidence of tricuspid valve stenosis or significant regurgitations.  •  Other valves also appear normal in structure with no evidence of stenosis or significant regurgitations.  No vegetations seen on these valves.  •  There is no evidence of pericardial effusion.  •  Comment-  •  In view of presence of MRSA bacteremia, recommend to extend antibiotic therapy for possible infective endocarditis and repeat TYRESE in 3 months.            Latest Reference Range & Units 12/13/22 16:31 12/14/22 07:15 12/14/22 11:56   Glucose 70 - 130 mg/dL 183 (H) 127 101   (H): Data is abnormally high    All medications reviewed.   acetaminophen, 1,000 mg, Oral, Q8H  aspirin, 81 mg, Oral, Q12H  castor oil-balsam peru, 1 application, Topical, Q12H  ceftaroline, 600 mg, Intravenous, Q12H  furosemide, 40 mg, Oral, Once  insulin detemir, 20 Units, Subcutaneous, Q12H  insulin lispro, 0-7 Units, Subcutaneous, TID AC  Insulin Lispro, 7 Units, Subcutaneous, TID With Meals  levothyroxine, 25 mcg, Oral, Q AM  meloxicam, 15 mg, Oral, Daily  midodrine, 2.5 mg, Oral, TID AC  pantoprazole, 40 mg, Oral, Q AM  polyethylene glycol, 17 g, Oral, Daily  pregabalin, 150 mg, Oral, Q12H  sodium chloride, 10 mL, Intravenous,  Q12H  vancomycin, 1,250 mg, Intravenous, Q12H      cyclobenzaprine, 5 mg, TID PRN  dextrose, 25 g, Q15 Min PRN  dextrose, 15 g, Q15 Min PRN  diphenhydrAMINE, 25 mg, Q6H PRN   Or  diphenhydrAMINE, 25 mg, Q6H PRN  glucagon (human recombinant), 1 mg, Q15 Min PRN  labetalol, 10 mg, Q4H PRN  naloxone, 0.1 mg, Q5 Min PRN  ondansetron, 4 mg, Q6H PRN   Or  ondansetron, 4 mg, Q6H PRN  Pharmacy to dose vancomycin, , Continuous PRN  sodium chloride, 500 mL, TID PRN  sodium chloride, 10 mL, PRN        Assessment & Plan       S/P right knee implant removal with insertion of antibiotic spacer    Infection of right knee (HCC)    Type 2 diabetes mellitus with hyperglycemia, with long-term current use of insulin (HCC)    Hypothyroid    MRSA sepsis    Chronic low back pain    PICC (peripherally inserted central catheter) in place    Pancytopenia    Plan  1. PT/OT, weightbearing per protocol.  2. Pain control-prns   3. IS-encouraged  4. DVT proph-mechanicals, aspirin.  5. Bowel regimen  6. Monitor post-op labs  7. DC planning,ongoing.  following     Lasix po x1 today 12/14/22    Hypothyroid  -Continue home Synthroid     DM  - hgb A1c on 11/28/2022 7.3  -Continue home bolus insulin with meals and long-acting insulin twice daily/ doses increased 12/1.   - Accu-Chek AC and HS with low dose SSI    TYRESE, done, noted, no obvious vegetation.    No infectious focus evident on MRIs.    Antibiotics per ID, Dr. Euceda is following   Discussed with  regarding blood counts, monitor for now. Consider changes to regimen.   Plan on tagged WBC scan       Tiffanie Cornell, ROSALINA  12/14/22  12:37 EST

## 2022-12-14 NOTE — PROGRESS NOTES
[facility] Progress Note        Antibiotics:  Anti-Infectives (From admission, onward)    Ordered     Dose/Rate Route Frequency Start Stop    12/09/22 1158  vancomycin 1250 mg/250 mL 0.9% NS IVPB ([facility])        Ordering Provider: Eulogio Verdugo, PharmD    1,250 mg  over 90 Minutes Intravenous Every 12 Hours 12/09/22 1230 01/08/23 0044    12/02/22 1159  ceftaroline (TEFLARO) 600 mg/100 mL 0.9% NS ([facility])        Ordering Provider: Bryce Euceda MD    600 mg Intravenous Every 12 Hours Scheduled 12/02/22 1500 01/08/23 2059    12/01/22 0933  vancomycin 1750 mg/500 mL 0.9% NS IVPB ([facility])        Ordering Provider: Bryce Euceda MD    20 mg/kg × 88.9 kg  over 120 Minutes Intravenous Once 12/01/22 1030 12/01/22 1310    12/01/22 0919  Pharmacy to dose vancomycin        Ordering Provider: Bryce Euceda MD     Does not apply Continuous PRN 12/01/22 0919 12/29/22 0918    11/30/22 1449  ceFAZolin in dextrose (ANCEF) IVPB solution 2 g        Ordering Provider: Brain Bentley MD    2 g  over 30 Minutes Intravenous Every 8 Hours 11/30/22 1800 12/01/22 0151    11/30/22 0815  ceFAZolin in dextrose (ANCEF) IVPB solution 2 g        Ordering Provider: Brain Bentley MD    2 g  over 30 Minutes Intravenous Once 11/30/22 0817 11/30/22 1045    11/30/22 0815  vancomycin 1250 mg/250 mL 0.9% NS IVPB ([facility])        Ordering Provider: Brain Bentley MD    15 mg/kg × 88.9 kg Intravenous Once 11/30/22 0817 11/30/22 0902          CC: fatigue    HPI:    Patient is a 57 y.o.  Yr old male with history of cirrhosis/diabetes and other comorbidity as outlined below.  History of left foot gas gangrene with osteomyelitis and MRSA bacteremia treated in [place] by Dr. Giordano in May/June 2022.  Family reports left transmetatarsal amputation in approximately 8 weeks IV vancomycin.  Notes from [place] indicate transthoracic echocardiogram no vegetation per Dr. Giordano; he thinks he might of had several weeks of oral antibiotic after that but not  entirely clear.  He is admitted to [facility] September 20 with progressive right knee pain occurring over the past week preceding admission.  He thinks he might of twisted it but ended up with progressive redness/swelling and pain.  No specific blunt force or penetrating trauma.  he was evaluated by orthopedics and taken to the operating room for right knee incision/drainage and concern for septic arthritis per Dr. Bentley; blood cultures had  MRSA.  Had dysuria but urinalysis not consistent with UTI.  No hematuria or pyuria     9/23 with TV echodensity; 9/26/22  TYRESE no vegetation per cardiology; daptomycin maintained, transferred to [place] with care taken over by Dr. Giordano; repeat blood cultures there on October 18 and October 20 and October 22 (dapto STU = 1)  with MRSA; blood cultures on October 24 and November 12 were negative per microbiology.  Repeat blood cultures November 20 with MRSA and daptomycin STU equal to 3, not susceptible per my discussion with microbiology; blood cultures positive again on November 22 but negative November 23. per Dr Giordano, teflaro had been added and concern regarding right knee with increased swelling/pain prompted transitioned back to [place] for further right knee surgery on November 30 by Dr Bentley     **I d/w Dr Giordano; he felt there was an obvious right arm PICC line infection in October with changes at the exit site and concern it was the reason for positive blood cultures at that time.  No catheter tip culture available per micro.  In November, recurrent bacteremia associated with right knee swelling/pain but no other focal symptoms per my discussion with Dr. Giordano; at risk for sequestered/metastatic focus    12/5/22 d/w Dr Giordano and  plans to get back to [facility];  Dr Giordano will facilitate additional radiographic workup if needed depending on clinical course    12/8/22 discussed with Dr. Giordano and Dr SHEEHAN again     12/9/22 d/w Dr SHEEHAN;  He came from [facility]  rehab in [place] per him; he had been given teflaro there previously per Dr Giordano    12/11/22 vancomycin level of 35 reflects vancomycin peak per pharmacy.  No new adverse effect per nursing.     12/14/22   Possible [facility] per case management.  They continue to consider her options.  No new distress per nursing.  Doing okay overall. he denies any new focal pain or symptomatology. postop right knee with controlled pain,  dull at present,  Better controlled per nursing overall, nonradiating, worse with movement, better with pain meds and 2 out of 10 in severity at present; he denies any other focal musculoskeletal pain.  No new back pain but he does relate chronic lumbar pain, dull at present, worse with movement, not progressive and no new weakness or numbness. No myalgia     Left foot with no redness/swelling or pain.  Surgical site healed with no open wound or active drainage. Has a left arm midline     No headache photophobia or neck stiffness.  No nausea vomiting diarrhea or abdominal pain.  No shortness of breath cough or hemoptysis.  No other new skin rash.  No other recent procedures or interventions.    No indwelling pacemaker        ROS:      12/14/22 No f/c/s. No n/v/d. No rash. No new ADR to Abx.     Constitutional-- No Fever, chills or sweats.  Appetite diminished with fatigue.  Heent-- No new vision, hearing or throat complaints.  No epistaxis or oral sores.  Denies odynophagia or dysphagia.  No flashers, floaters or eye pain.   No headache, photophobia or neck stiffness.  CV-- No chest pain, palpitation or syncope  Resp-- No SOB/cough/Hemoptysis  GI- No nausea, vomiting, or diarrhea.  No hematochezia, melena, or hematemesis. Denies jaundice  -- No dysuria, hematuria, or flank pain.  Denies hesitancy, urgency or flank pain.  Lymph- no swollen lymph nodes in neck/axilla or groin.   Heme- No active bruising or bleeding; no Hx of DVT or PE.  MS-- no swelling or pain in the bones or joints of arms/legs.  No  "new back pain.  Neuro-- No acute focal weakness or numbness in the arms or legs.  No seizures.     Full 12 point review of systems reviewed and negative otherwise for acute complaints, except for above      PE: nursing/chaperone present  /68 (BP Location: Right arm, Patient Position: Lying)   Pulse 67   Temp 98 °F (36.7 °C) (Oral)   Resp 17   Ht 177.8 cm (70\")   Wt 88.9 kg (196 lb)   SpO2 96%   BMI 28.12 kg/m²     GENERAL: Awake and alert, in no acute distress.  Chronically ill-appearing; room air  HEENT: Normocephalic, atraumatic.    No conjunctival injection. No icterus. Oropharynx clear without evidence of thrush or exudate. No evidence of peridontal disease.    NECK: Supple without nuchal rigidity. No mass.   HEART: RRR; soft murmur LSB, rubs, gallops.   LUNGS: Diminished at bases but otherwise clear to auscultation bilaterally without wheezing, rales, rhonchi. Normal respiratory effort. Nonlabored. No dullness.  ABDOMEN: Soft, nontender, nondistended. Positive bowel sounds. No rebound or guarding. NO mass or HSM.  EXT:  No cyanosis, clubbing or edema. No cord.   MSK: FROM without joint effusions noted arms/legs.    SKIN: Warm and dry without cutaneous eruptions on Inspection/palpation.    NEURO: Oriented to PPT. No focal deficits on motor/sensory exam at arms/legs.     Left foot with no redness induration or warmth.  No discrete mass bulge or fluctuance.  No crepitus or bulla.  Prior surgical site healed at [facility].  No open wound or active drainage     Right knee postop surgical site no new redness or purulence.    No discrete mass bulge or fluctuance.  No crepitus or bulla. Better range of motion     No peripheral stigmata/phenomena of endocarditis     IV without obvious redness or drainage    Laboratory Data    Results from last 7 days   Lab Units 12/13/22  0452 12/12/22  0347 12/11/22  0358   WBC 10*3/mm3 2.80* 3.07* 3.39*   HEMOGLOBIN g/dL 7.5* 7.4* 7.5*   HEMATOCRIT % 24.4* 24.0* 24.0* " "  PLATELETS 10*3/mm3 82* 85* 79*     Results from last 7 days   Lab Units 12/13/22  0452   SODIUM mmol/L 140   POTASSIUM mmol/L 4.1   CHLORIDE mmol/L 109*   CO2 mmol/L 25.0   BUN mg/dL 15   CREATININE mg/dL 0.72*   GLUCOSE mg/dL 169*   CALCIUM mg/dL 7.6*     Results from last 7 days   Lab Units 12/13/22  0452   ALK PHOS U/L 257*   BILIRUBIN mg/dL 0.4   ALT (SGPT) U/L 29   AST (SGOT) U/L 48*               Estimated Creatinine Clearance: 127.1 mL/min (A) (by C-G formula based on SCr of 0.72 mg/dL (L)).      Microbiology:      Radiology:  Imaging Results (Last 72 Hours)     ** No results found for the last 72 hours. **            Impression:       --Acute/recurrent/persistent MRSA bacteremia/septicemia; prior history positive cultures May 2022 and recurrent despite prior  IV vancomycin and left foot surgery in addition to other supportive measures.   TTE with ? TV echodensity prior admit in September and TYRESE no vegetation at that time at [facility];   blood culture positivity again at [facility] in October/November as above.  Daptomycin STU increased to 3 on 11/20 isolate and not sensitive to daptomycin per microbiology.  Discussed on several occasions with Dr. Giordano; had a repeat TYRESE on October 31 with mention of \" moderate size echodense structure above the posterior tricuspid leaflet but not attached to the leaflet\" per cardiology there, described potentially as a \"normal variant\" per their note.  High risk for further serious morbidity and other serious sequela including persistent/progressive or recurrent infection, metastatic foci of involvement and other dire consequences;  vancomycin sensitivity maintained albeit with STU =2; adjusted to vancomycin/ceftaroline in light of daptomycin resistance and I asked microbiology     **Blood cultures November 23 negative so far    **repeat TYRESE 12/1 ; no definitive vegetation per cardiology and only mild tricuspid valve regurgitation stable from prior exam per them.  " Ill-defined area in the right atrium appeared stable to them. D/w Dr Trent; you still may need to consider PET scan and likely to require repeat echocardiography during his course.  I have discussed with Dr. Giordano and he will arrange     --Acute right knee pain/septic arthritis with surgical intervention, incision/debridement by Dr. Bentley on September 21.   MRSA+ in the setting of MRSA bacteremia at that time;  Repeat surgery 11/30, d/w Dr Bentley. Cultures November 30 negative     --History of cirrhosis by records.  Unclear etiology.   further GI referral/eval per  medicine team     --Diabetes.  You need to tightly control blood sugar to give best chance for healing.;  Described as uncontrolled by medicine team at admission     -- Chronic lumbar back pain for decades.  This is not new, not worse and no new location.  No new exacerbation or new neurologic symptomatology in his extremities.  MRI 12/3 suboptimal per radiology;  may require further imaging depending on his clinical course to help exclude any other sequestered/spinal-paraspinal focus depending on clinical course     -- cytopenias/TCP ; monitor; White blood cell count trended down a bit.   He did have a white blood cell count of 2.85 in mid November prior to transfer here.  Continue to monitor.  This may be a chronic issue     PLAN:       -- IV vancomycin/ceftaroline potentially 6 weeks but to depend on clinical course/study results and response to therapy.  High risk for occult endovascular focus with multiple past positive blood cultures, most recently November 20 and 22; after IV antibiotics, depending on clinical course, may require chronic oral suppression step down (likely to be decided by Dr. Giordano )     -- Check/review labs cultures and scans     -- Partial history per nursing staff     --d/w pharmacy       -- Highly complex set of issues with high risk for further serious morbidity and other serious sequela     -- Discussed with microbiology;  they are doing further assessment to blood cultures drawn November 22     --d/w  Dr SHEEHAN     --  new PICC line,   No specific new pain or other suppurative changes at the surface at either site.  I discussed with Dr. Trent regarding additional diagnostics for endocarditis such as PET scan; this still may need to be considered as outpatient depending on clinical course.  see above.    --monitor cell counts     **MRI's noted without specific focus at Lumbar spine or foot although spinal imaging suboptimal per radiology; I d/w Dr Giordano with plans for transition back to [facility] there; depending on clinical course/symptomatology, Dr. Giordano will consider white blood cell scan versus repeat imaging to evaluate for potential occult focus.    ** 12/12 d/w Dr SHEEHAN;  Discussed with case management on 12/13 and possible [facility] soon; May require white blood cell scan  At some point but currently clinically stable    **Copied text in this note has been reviewed and is accurate as of 12/14/22.        Bryce Euceda MD  12/14/2022

## 2022-12-14 NOTE — CASE MANAGEMENT/SOCIAL WORK
Case Management Discharge Note      Final Note: Mr. Hardwick will be discharged tomorrow to [facility]. He will be transported there by [employer] at 1100 via wheelchair. I spoke with Mr. Hardwick at the bedside and he is in agreement with this plan. COVID test will be needed and has been ordered. No pharmacy updated needed. Discharge summary to be faxed to [phone]. Nurse to call report to [phone].         Selected Continued Care - Admitted Since 11/30/2022     Destination Coordination complete.    Service Provider Selected Services Address Phone Fax Patient Preferred    [facility] [address], [place] [address] [phone] --          Durable Medical Equipment    No services have been selected for the patient.              Dialysis/Infusion    No services have been selected for the patient.              Home Medical Care    No services have been selected for the patient.              Therapy    No services have been selected for the patient.              Community Resources    No services have been selected for the patient.              Community & DME    No services have been selected for the patient.                  Transportation Services  W/C Van: Other ([employer])    Final Discharge Disposition Code: 63 - [facility]

## 2022-12-14 NOTE — THERAPY PROGRESS REPORT/RE-CERT
Patient Name: Melchor Hardwick III  : 1965    MRN: [id number]                              Today's Date: 2022       Admit Date: 2022    Visit Dx:     ICD-10-CM ICD-9-CM   1. Infection of right knee (HCC)  M00.9 686.9     Patient Active Problem List   Diagnosis   • Hyperlipidemia   • Hypertensive disorder   • Obesity   • Type 2 diabetes mellitus with hyperglycemia, with long-term current use of insulin (HCC)   • Gas gangrene of foot (HCC)   • Cellulitis in diabetic foot (HCC)   • Other acute osteomyelitis of left foot (HCC)   • Osteomyelitis (HCC)   • Critical illness myopathy   • Staphylococcal arthritis of right knee (HCC)   • Other cirrhosis of liver (HCC)   • MRSA bacteremia   • Endocarditis of tricuspid valve   • Wound of right buttock   • History of osteomyelitis   • Debility   • Infection of right knee (HCC)   • S/P right knee implant removal with insertion of antibiotic spacer   • Hypothyroid   • MRSA sepsis   • Chronic low back pain   • PICC (peripherally inserted central catheter) in place     Past Medical History:   Diagnosis Date   • Diabetes mellitus (HCC)     4 times per day gets checked for sugar at rehab   • Dizzy    • Hyperlipidemia    • Hypertension    • Hypothyroid 2022   • MRSA infection     blood -      • Orthostatic hypotension    • Pressure sore on buttocks     top crack -  sees wound - but now rn take care of it at rehab - minor opening - dime size - pat nurse didnt assess-  pt states getting better   • Pyogenic arthritis of right knee joint, due to unspecified organism (HCC) 2022   • Sepsis (HCC)    • Wears glasses     readers     Past Surgical History:   Procedure Laterality Date   • ABSCESS DRAINAGE  2004    PERINEUM   • AMPUTATION FOOT Left 2022    Procedure: AMPUTATION FOOT;  Surgeon: Addison Colby MD;  [facility];  Service: Podiatry;  Laterality: Left;   • INCISION AND DRAINAGE LEG Left 05/10/2022    Procedure: INCISION AND DRAINAGE  LOWER EXTREMITY;  Surgeon: Addison Colby MD;  Location:  [facility];  Service: Podiatry;  Laterality: Left;   • KNEE INCISION AND DRAINAGE Right 09/21/2022    Procedure: KNEE INCISION AND DRAINAGE RIGHT;  Surgeon: Brain Bentley MD;  Location:  [facility];  Service: Orthopedics;  Laterality: Right;   • PERIPHERALLY INSERTED CENTRAL CATHETER INSERTION Left    • TOTAL KNEE  PROSTHESIS REMOVAL W/ SPACER INSERTION Right 11/30/2022    Procedure: ANTIBIOTIC SPACER PLACEMENT KNEE - RIGHT;  Surgeon: Brain Bentley MD;  Location:  [facility];  Service: Orthopedics;  Laterality: Right;   • WOUND DEBRIDEMENT Left 05/13/2022    Procedure: DEBRIDEMENT FOOT;  Surgeon: Addison Colby MD;  Location:  [facility];  Service: Podiatry;  Laterality: Left;      General Information     Row Name 12/14/22 1149          Physical Therapy Time and Intention    Document Type progress note/recertification  -FW     Mode of Treatment physical therapy  -FW     Row Name 12/14/22 1149          General Information    Patient Profile Reviewed yes  -FW     Existing Precautions/Restrictions fall;orthostatic hypotension;other (see comments)  PICC, walking boot per L met amputation, abdominal binder per orthostatic hypotension, medial coccyx pressure injury  -FW     Row Name 12/14/22 1149          Cognition    Orientation Status (Cognition) oriented x 4  -FW     Row Name 12/14/22 1149          Safety Issues, Functional Mobility    Impairments Affecting Function (Mobility) pain;range of motion (ROM);balance;endurance/activity tolerance  -FW           User Key  (r) = Recorded By, (t) = Taken By, (c) = Cosigned By    Initials Name Provider Type    FW Artur Quinones PT Physical Therapist               Mobility     Row Name 12/14/22 1150          Bed Mobility    Bed Mobility supine-sit;sit-supine  -FW     Supine-Sit [place] (Bed Mobility) modified independence  -FW     Sit-Supine [place] (Bed Mobility) modified independence  -FW     Assistive  Device (Bed Mobility) bed rails;head of bed elevated  -     Row Name 12/14/22 1150          Sit-Stand Transfer    Sit-Stand [place] (Transfers) contact guard;verbal cues  -     Assistive Device (Sit-Stand Transfers) walker, front-wheeled  -FW     Row Name 12/14/22 1150          Gait/Stairs (Locomotion)    [place] Level (Gait) contact guard;verbal cues  -FW     Assistive Device (Gait) walker, front-wheeled  -FW     Distance in Feet (Gait) 124  -FW     Deviations/Abnormal Patterns (Gait) bilateral deviations;yao decreased  -FW     Bilateral Gait Deviations forward flexed posture;heel strike decreased  -     Comment, (Gait/Stairs) no buckling, LOB, or dizziness throughout gait- distance limited by fatigue  -     Row Name 12/14/22 1150          Mobility    Extremity Weight-bearing Status left lower extremity;right lower extremity  -FW     Left Lower Extremity (Weight-bearing Status) weight-bearing as tolerated (WBAT)  -FW     Right Lower Extremity (Weight-bearing Status) weight-bearing as tolerated (WBAT)  -           User Key  (r) = Recorded By, (t) = Taken By, (c) = Cosigned By    Initials Name Provider Type     Artur Quinones PT Physical Therapist               Obj/Interventions     Row Name 12/14/22 1151          Motor Skills    Therapeutic Exercise hip;knee;ankle  Therex included 10 reps of: SLR, heel slides, LAQ, ankle DF/PF, quad sets  -     Row Name 12/14/22 1151          Balance    Balance Assessment sitting static balance;sitting dynamic balance;standing static balance;standing dynamic balance  -     Static Sitting Balance independent  -     Dynamic Sitting Balance supervision  -     Position, Sitting Balance sitting edge of bed  -     Static Standing Balance standby assist  -     Dynamic Standing Balance contact guard  -     Position/Device Used, Standing Balance supported;walker, front-wheeled  -FW           User Key  (r) = Recorded By, (t) = Taken By, (c) =  Cosigned By    Initials Name Provider Type    Artur Sheikh PT Physical Therapist               Goals/Plan     Row Name 12/14/22 1157          Bed Mobility Goal 1 (PT)    Activity/Assistive Device (Bed Mobility Goal 1, PT) bed mobility activities, all  -FW     [place] Level/Cues Needed (Bed Mobility Goal 1, PT) independent  -FW     Time Frame (Bed Mobility Goal 1, PT) 10 days;long term goal (LTG)  -FW     Progress/Outcomes (Bed Mobility Goal 1, PT) new goal  -FW     Row Name 12/14/22 1157          Transfer Goal 1 (PT)    Activity/Assistive Device (Transfer Goal 1, PT) sit-to-stand/stand-to-sit;bed-to-chair/chair-to-bed  -FW     [place] Level/Cues Needed (Transfer Goal 1, PT) modified independence  -FW     Time Frame (Transfer Goal 1, PT) long term goal (LTG);10 days  -FW     Progress/Outcome (Transfer Goal 1, PT) good progress toward goal;goal ongoing  -FW     Row Name 12/14/22 1157          Gait Training Goal 1 (PT)    Activity/Assistive Device (Gait Training Goal 1, PT) gait (walking locomotion);assistive device use;walker, rolling  -FW     [place] Level (Gait Training Goal 1, PT) modified independence  -FW     Distance (Gait Training Goal 1, PT) 150  -FW     Time Frame (Gait Training Goal 1, PT) long term goal (LTG);10 days  -FW     Progress/Outcome (Gait Training Goal 1, PT) good progress toward goal;goal ongoing  -FW     Row Name 12/14/22 1157          ROM Goal 1 (PT)    ROM Goal 1 (PT) R knee ROM 0-90  -FW     Time Frame (ROM Goal 1, PT) long-term goal (LTG);10 days  -FW     Progress/Outcome (ROM Goal 1, PT) goal ongoing  -FW           User Key  (r) = Recorded By, (t) = Taken By, (c) = Cosigned By    Initials Name Provider Type    Artur Sheikh PT Physical Therapist               Clinical Impression     Row Name 12/14/22 1151          Pain    Pretreatment Pain Rating 0/10 - no pain  -FW     Posttreatment Pain Rating 3/10  -FW     Pain Location - Side/Orientation Right  -FW      Pain Location incisional  -FW     Pain Location - knee  -FW     Pain Intervention(s) Repositioned;Ambulation/increased activity  -FW     Row Name 12/14/22 1150          Plan of Care Review    Plan of Care Reviewed With patient  -FW     Outcome Evaluation Pt continues with decreased activity tolerance, balance deficits, decreased functional strength, and pain limiting mobility. Pt performed bed mobility mod-I, STS CGA, and ambulated 124' CGA w/ a RW demonstrating a forward flexed posture and slow, antalgic gait pattern. Continue PT POC.  -FW     Row Name 12/14/22 1153          Vital Signs    Pre Systolic BP Rehab --  vss  -FW     O2 Delivery Pre Treatment room air  -FW     O2 Delivery Intra Treatment room air  -FW     O2 Delivery Post Treatment room air  -FW     Pre Patient Position Supine  -FW     Intra Patient Position Standing  -FW     Post Patient Position Supine  -FW     Row Name 12/14/22 1152          Positioning and Restraints    Pre-Treatment Position in bed  -FW     Post Treatment Position bed  -FW     In Bed notified nsg;side lying left;call light within reach;encouraged to call for assist;exit alarm on;SCD pump applied;L waffle boot;R waffle boot  -FW           User Key  (r) = Recorded By, (t) = Taken By, (c) = Cosigned By    Initials Name Provider Type    FW Artur Quinones, PT Physical Therapist               Outcome Measures     Row Name 12/14/22 2275          How much help from another person do you currently need...    Turning from your back to your side while in flat bed without using bedrails? 4  -FW     Moving from lying on back to sitting on the side of a flat bed without bedrails? 4  -FW     Moving to and from a bed to a chair (including a wheelchair)? 3  -FW     Standing up from a chair using your arms (e.g., wheelchair, bedside chair)? 3  -FW     Climbing 3-5 steps with a railing? 3  -FW     To walk in hospital room? 3  -FW     AM-PAC 6 Clicks Score (PT) 20  -FW     Highest level of mobility  6 --> Walked 10 steps or more  -     Row Name 12/14/22 1155          Functional Assessment    Outcome Measure Options AM-PAC 6 Clicks Basic Mobility (PT)  -           User Key  (r) = Recorded By, (t) = Taken By, (c) = Cosigned By    Initials Name Provider Type    Artur Sheikh PT Physical Therapist                             Physical Therapy Education     Title: PT OT SLP Therapies (In Progress)     Topic: Physical Therapy (Done)     Point: Mobility training (Done)     Learning Progress Summary           Patient Acceptance, E, VU by  at 12/14/2022 1156    Eager, E,TB, VU,DU by  at 12/13/2022 1156    Acceptance, E,D, VU,DU by  at 12/12/2022 0917    Acceptance, E, NR by AS at 12/10/2022 0943    Eager, E,H, VU,DU,NR by  at 12/9/2022 1620    Comment: Reviewed safety/technique with transfers, ambulation, HEP, PT POC    Eager, E, VU by SC at 12/8/2022 1418    Comment: reviewed benefits of activity    Eager, E, VU,NR by SC at 12/7/2022 1156    Comment: reviewed ROM exercise    Eager, E, VU,DU,NR by  at 12/6/2022 1637    Comment: Reviewed safety/technique with transfers, ambulation, HEP, PT POC    Eager, E, VU,DU,NR by  at 12/5/2022 1428    Comment: Reviewed safety/technique with bed mobility, transfers, ambulation, HEP, PT POC    Acceptance, E,TB, VU by  at 12/4/2022 1003    Acceptance, E,TB, VU by  at 12/3/2022 1137    Acceptance, E, NR by AS at 12/2/2022 1051    Eager, E, VU,DU,NR by  at 12/1/2022 0946    Comment: Educated pt. safety/technique with bed mobility, transfers, ambulation, WB status, use of knee immobilizer, PT POC                   Point: Home exercise program (Done)     Learning Progress Summary           Patient Acceptance, E, VU by  at 12/14/2022 1156    Eager, E,TB, VU,DU by  at 12/13/2022 1156    Acceptance, E,D, VU,DU by  at 12/12/2022 0917    YANELY Cheatham NR by AS at 12/10/2022 0943    YANELY Junior H, RAH BERNABE,NR by  at 12/9/2022 1620    Comment: Reviewed  safety/technique with transfers, ambulation, HEP, PT POC    Eager, E, VU by SC at 12/8/2022 1418    Comment: reviewed benefits of activity    Eager, E, VU,NR by SC at 12/7/2022 1156    Comment: reviewed ROM exercise    Eager, E, VU,DU,NR by  at 12/6/2022 1637    Comment: Reviewed safety/technique with transfers, ambulation, HEP, PT POC    Eager, E, VU,DU,NR by  at 12/5/2022 1428    Comment: Reviewed safety/technique with bed mobility, transfers, ambulation, HEP, PT POC    Acceptance, E,TB, VU by  at 12/4/2022 1003    Acceptance, E,TB, VU by  at 12/3/2022 1137    Acceptance, E, NR by AS at 12/2/2022 1051    Eager, E, VU,DU,NR by  at 12/1/2022 0946    Comment: Educated pt. safety/technique with bed mobility, transfers, ambulation, WB status, use of knee immobilizer, PT POC                   Point: Body mechanics (Done)     Learning Progress Summary           Patient Acceptance, E, VU by  at 12/14/2022 1156    Eager, E,TB, VU,DU by SS at 12/13/2022 1156    Acceptance, E,D, VU,DU by  at 12/12/2022 0917    Acceptance, E, NR by AS at 12/10/2022 0943    Eager, E,H, VU,DU,NR by  at 12/9/2022 1620    Comment: Reviewed safety/technique with transfers, ambulation, HEP, PT POC    Eager, E, VU by SC at 12/8/2022 1418    Comment: reviewed benefits of activity    Eager, E, VU,NR by SC at 12/7/2022 1156    Comment: reviewed ROM exercise    Eager, E, VU,DU,NR by  at 12/6/2022 1637    Comment: Reviewed safety/technique with transfers, ambulation, HEP, PT POC    Eager, E, VU,DU,NR by  at 12/5/2022 1428    Comment: Reviewed safety/technique with bed mobility, transfers, ambulation, HEP, PT POC    Acceptance, E,TB, VU by  at 12/4/2022 1003    Acceptance, E,TB, VU by  at 12/3/2022 1137    Acceptance, E, NR by AS at 12/2/2022 1051    Eager, YANELY, VU,DU,NR by SS at 12/1/2022 0946    Comment: Educated pt. safety/technique with bed mobility, transfers, ambulation, WB status, use of knee immobilizer, PT POC                    Point: Precautions (Done)     Learning Progress Summary           Patient Acceptance, E, VU by  at 12/14/2022 1156    Eager, E,TB, VU,DU by  at 12/13/2022 1156    Acceptance, E,D, VU,DU by  at 12/12/2022 0917    Acceptance, E, NR by AS at 12/10/2022 0943    Eager, E,H, VU,DU,NR by  at 12/9/2022 1620    Comment: Reviewed safety/technique with transfers, ambulation, HEP, PT POC    Eager, E, VU by SC at 12/8/2022 1418    Comment: reviewed benefits of activity    Eager, E, VU,NR by SC at 12/7/2022 1156    Comment: reviewed ROM exercise    Eager, E, VU,DU,NR by  at 12/6/2022 1637    Comment: Reviewed safety/technique with transfers, ambulation, HEP, PT POC    Eager, E, VU,DU,NR by  at 12/5/2022 1428    Comment: Reviewed safety/technique with bed mobility, transfers, ambulation, HEP, PT POC    Acceptance, E,TB, VU by  at 12/4/2022 1003    Acceptance, E,TB, VU by  at 12/3/2022 1137    Acceptance, E, NR by AS at 12/2/2022 1051    Eager, E, VU,DU,NR by  at 12/1/2022 0946    Comment: Educated pt. safety/technique with bed mobility, transfers, ambulation, WB status, use of knee immobilizer, PT POC                               User Key     Initials Effective Dates Name Provider Type Discipline    SC 06/16/21 -  Orion Reyes, PT Physical Therapist PT    AS 06/16/21 -  Ana Cristina Gutierrez, PTA Physical Therapist Assistant PT    FW 05/05/22 -  Artur Quinones, PT Physical Therapist PT     09/22/20 -  Ehsan De La Cruz, PT Physical Therapist PT    HP 06/01/21 -  Tonja Reyes, PT Physical Therapist PT     06/01/21 -  Susan Quesada, PT Physical Therapist PT              PT Recommendation and Plan     Plan of Care Reviewed With: patient  Outcome Evaluation: Pt continues with decreased activity tolerance, balance deficits, decreased functional strength, and pain limiting mobility. Pt performed bed mobility mod-I, STS CGA, and ambulated 124' CGA w/ a RW demonstrating a forward flexed posture and slow,  antalgic gait pattern. Continue PT POC.     Time Calculation:    PT Charges     Row Name 12/14/22 1157             Time Calculation    Start Time 1123  -FW      PT Received On 12/14/22  -FW      PT Goal Re-Cert Due Date 12/24/22  -FW         Timed Charges    35377 - PT Therapeutic Exercise Minutes 6  -FW      99880 - Gait Training Minutes  12  -FW      26143 - PT Therapeutic Activity Minutes 8  -FW         Total Minutes    Timed Charges Total Minutes 26  -FW       Total Minutes 26  -FW            User Key  (r) = Recorded By, (t) = Taken By, (c) = Cosigned By    Initials Name Provider Type    FW Artur Quinones, JANNETH Physical Therapist              Therapy Charges for Today     Code Description Service Date Service Provider Modifiers Qty    75714209770 HC GAIT TRAINING EA 15 MIN 12/14/2022 Artur Quinones, PT GP 1    [id number] HC PT THERAPEUTIC ACT EA 15 MIN 12/14/2022 Artur Quinones, PT GP 1          PT G-Codes  Outcome Measure Options: AM-PAC 6 Clicks Basic Mobility (PT)  AM-PAC 6 Clicks Score (PT): 20  AM-PAC 6 Clicks Score (OT): 17  PT Discharge Summary  Anticipated Discharge Disposition (PT): inpatient rehabilitation facility    Artur Quinones PT  12/14/2022

## 2022-12-15 VITALS
TEMPERATURE: 98.2 F | RESPIRATION RATE: 18 BRPM | BODY MASS INDEX: 28.06 KG/M2 | HEIGHT: 70 IN | WEIGHT: 196 LBS | DIASTOLIC BLOOD PRESSURE: 62 MMHG | HEART RATE: 70 BPM | OXYGEN SATURATION: 99 % | SYSTOLIC BLOOD PRESSURE: 128 MMHG

## 2022-12-15 LAB
GLUCOSE BLDC GLUCOMTR-MCNC: 130 MG/DL (ref 70–130)
GLUCOSE BLDC GLUCOMTR-MCNC: 151 MG/DL (ref 70–130)

## 2022-12-15 PROCEDURE — 82962 GLUCOSE BLOOD TEST: CPT

## 2022-12-15 PROCEDURE — 63710000001 INSULIN LISPRO (HUMAN) PER 5 UNITS: Performed by: INTERNAL MEDICINE

## 2022-12-15 PROCEDURE — 97535 SELF CARE MNGMENT TRAINING: CPT

## 2022-12-15 PROCEDURE — 25010000002 CEFTAROLINE FOSAMIL PER 10 MG: Performed by: INTERNAL MEDICINE

## 2022-12-15 PROCEDURE — 63710000001 INSULIN DETEMIR PER 5 UNITS: Performed by: INTERNAL MEDICINE

## 2022-12-15 RX ORDER — HYDROCODONE BITARTRATE AND ACETAMINOPHEN 5; 325 MG/1; MG/1
1 TABLET ORAL EVERY 8 HOURS PRN
Refills: 0
Start: 2022-12-15 | End: 2022-12-19

## 2022-12-15 RX ORDER — MIDODRINE HYDROCHLORIDE 2.5 MG/1
2.5 TABLET ORAL
Start: 2022-12-15

## 2022-12-15 RX ORDER — MELOXICAM 15 MG/1
15 TABLET ORAL DAILY
Start: 2022-12-15

## 2022-12-15 RX ORDER — ASPIRIN 81 MG/1
81 TABLET ORAL EVERY 12 HOURS SCHEDULED
Start: 2022-12-15 | End: 2023-01-19

## 2022-12-15 RX ADMIN — CEFTAROLINE FOSAMIL 600 MG: 600 POWDER, FOR SOLUTION INTRAVENOUS at 09:46

## 2022-12-15 RX ADMIN — INSULIN LISPRO 7 UNITS: 100 INJECTION, SOLUTION INTRAVENOUS; SUBCUTANEOUS at 08:15

## 2022-12-15 RX ADMIN — PANTOPRAZOLE SODIUM 40 MG: 40 TABLET, DELAYED RELEASE ORAL at 05:56

## 2022-12-15 RX ADMIN — CASTOR OIL AND BALSAM, PERU 1 APPLICATION: 788; 87 OINTMENT TOPICAL at 08:16

## 2022-12-15 RX ADMIN — PREGABALIN 150 MG: 150 CAPSULE ORAL at 08:14

## 2022-12-15 RX ADMIN — ACETAMINOPHEN 1000 MG: 500 TABLET, FILM COATED ORAL at 05:37

## 2022-12-15 RX ADMIN — MELOXICAM 15 MG: 15 TABLET ORAL at 09:46

## 2022-12-15 RX ADMIN — LEVOTHYROXINE SODIUM 25 MCG: 25 TABLET ORAL at 05:37

## 2022-12-15 RX ADMIN — ASPIRIN 81 MG: 81 TABLET, COATED ORAL at 08:14

## 2022-12-15 RX ADMIN — Medication 10 ML: at 09:46

## 2022-12-15 RX ADMIN — INSULIN DETEMIR 20 UNITS: 100 INJECTION, SOLUTION SUBCUTANEOUS at 08:15

## 2022-12-15 NOTE — DISCHARGE PLACEMENT REQUEST
"Melchor Perez III (57 y.o. Male)     Date of Birth   1965    Social Security Number       Address   [address] [address]    Home Phone   [phone]    MRN   [id number]       [facility]    Marital Status   Single                            Admission Date   11/30/22    Admission Type   Elective    Admitting Provider   Brain Bentley MD    Attending Provider   Rose Diaz MD    Department, Room/Bed   [facility] 3G, S362/1       Discharge Date       Discharge Disposition   Rehab Facility or Unit (DC - External)    Discharge Destination                               Attending Provider: Rose Diaz MD    Allergies: No Known Allergies    Isolation: None   Infection: MRSA (11/21/22), MRSA No Isolation this Admit (11/29/22)   Code Status: CPR    Ht: 177.8 cm (70\")   Wt: 88.9 kg (196 lb)    Admission Cmt: None   Principal Problem: S/P right knee implant removal with insertion of antibiotic spacer [Z96.659]                 Active Insurance as of 11/30/2022     Primary Coverage     Payor Plan Insurance Group Employer/Plan Group    [facility] COMMUNITY PLAN [facility] COMMUNITY PLAN [facility] [place]     Payor Plan Address Payor Plan Phone Number Payor Plan Fax Number Effective Dates    [address]   5/1/2022 - None Entered    [facility] [address]       Subscriber Name Subscriber Birth Date Member ID       MELCHOR PEREZ III 1965 [id number]           Secondary Coverage     Payor Plan Insurance Group Employer/Plan Group    [employer] - ([place] ONLY) [employer]      Payor Plan Address Payor Plan Phone Number Payor Plan Fax Number Effective Dates    [address] [address]   10/5/2022 - None Entered    [facility] [address]       Subscriber Name Subscriber Birth Date Member ID       MELCHOR PEREZ III 1965 [id number]                 Emergency Contacts      (Rel.) Home Phone Work Phone Mobile Phone    ORLINAMENA (Sister) [phone] -- [phone]    "              Discharge Summary      Rose Diaz MD at 12/15/22 0943          Patient Name: Melchor Hardwick III  MRN: [id number]  : 1965  DOS: 12/15/2022    Attending: Brain Bentley MD    Primary Care Provider: Missy Up APRN    Date of Admission:.2022  7:25 AM    Date of Discharge:  12/15/2022    Discharge Diagnosis:     S/P right knee implant removal with insertion of antibiotic spacer    Type 2 diabetes mellitus with hyperglycemia, with long-term current use of insulin (HCC)    Infection of right knee (HCC)    Hypothyroid    S/p MRSA sepsis, recurrent    Chronic low back pain    PICC (peripherally inserted central catheter) in place    Pancytopenia, stable.      Consults: Infectious disease, Bryce Euceda MD  Hospital Course     At admit:  Patient is a pleasant 57 y.o. male presented for scheduled surgery by Dr. Bentley.  He underwent right knee implant removal with insertion of antibiotic spacer under spinal anesthesia.  He tolerated surgery well and was admitted for further medical management.  He was admitted to [facility] on 2022 with pyrogenic arthritis of his right knee.  He underwent incision and drainage by Dr. Bentley on 22.  He was seen by infectious disease and was started on IV antibiotics via PICC line.  He had TYRESE and was found to have some tricuspid vegetation.  He was seen by CT surgery who recommended follow-up TYRESE after 2-4 weeks.    He was admitted to [facility] 10/28/2022 with right knee septic arthritis and osteomyelitis.  He had left transmetatarsal mutation in May of this year due to infection.  He was considerably debilitated at time of admission.     He states that about 2 weeks ago despite continuing daily IV antibiotic therapy his right knee became swollen painful and warm to touch.  He had fevers and chills as well.  He states his antibiotic regimen was changed and he had minimal relief of pain and swelling.     When  seen postop he is doing well.  His pain is well controlled.  He denies nausea, shortness of breath or chest pain.  No history of DVT or PE.    After admit:  Patient received pain medication as needed for pain control along with peripheral nerve block catheter and ropivacaine infusion, multimodal pain control measures helped during his stay, nerve block catheter has since been removed.    He was seen by physical therapy and Occupational Therapy, participated well, his pain has been under good control.    He received IV antibiotics, he was followed throughout his stay by Dr. Bryce Euceda with [facility].  A PICC line has been placed in expectation of several weeks of IV antibiotics.  Daptomycin has been discontinued due to rising STU.  Patient has been on vancomycin and ceftaroline and has been tolerating these antibiotics.    He had some acute blood loss anemia but has been asymptomatic and he did not require transfusion.  He has mild leukopenia and thrombocytopenia, both of which have been stable.( records indicate Cirrhosis history. Can be followed as outpatient)    During his stay he was maintained on DVT prophylaxis with mechanicals and an aspirin twice daily.    He had further work-up for his bacteremia including MRI of his left foot where he previously had TMA, MRI of his lumbar spine, as well as a TYRESE, none of these have been positive for a source of infection.    Tagged WBC scan is being considered, patient has been stable and this has not been ordered at this point.    During his stay he was maintained on his other medication regimen Including his a regimen, Thyroid replacement, as well as medications for his known orthostatic hypotension, his blood pressure has been stable, I have added holding parameters for his midodrine.    After being declined for admit at [facility] he has been accepted to [facility], he is being transferred there today in good condition.    I have discussed with the patient, Dr. Euceda, as  well as with Dr. Garcia the accepting physician at [facility].    Procedures Performed  Procedure(s):  ANTIBIOTIC SPACER PLACEMENT KNEE - RIGHT       Pertinent Test Results:    I reviewed the patient's new clinical results.   Results from last 7 days   Lab Units 12/13/22 0452 12/12/22 0347 12/11/22  0358   WBC 10*3/mm3 2.80* 3.07* 3.39*   HEMOGLOBIN g/dL 7.5* 7.4* 7.5*   HEMATOCRIT % 24.4* 24.0* 24.0*   PLATELETS 10*3/mm3 82* 85* 79*     Results from last 7 days   Lab Units 12/13/22  0452 12/12/22 0347 12/11/22  0358   SODIUM mmol/L 140 138 141   POTASSIUM mmol/L 4.1 4.3 4.2   CHLORIDE mmol/L 109* 108* 109*   CO2 mmol/L 25.0 26.0 27.0   BUN mg/dL 15 16 16   CREATININE mg/dL 0.72* 0.59* 0.61*   CALCIUM mg/dL 7.6* 7.8* 8.2*   BILIRUBIN mg/dL 0.4 0.4 0.5   ALK PHOS U/L 257* 285* 271*   ALT (SGPT) U/L 29 36 41   AST (SGOT) U/L 48* 58* 72*   GLUCOSE mg/dL 169* 174* 67     I reviewed the patient's new imaging including images and reports.  Collected Updated Procedure Result Status    12/14/2022 1714 12/14/2022 1757 COVID PRE-OP / PRE-PROCEDURE SCREENING ORDER (NO ISOLATION) - Swab, Nasopharynx [[id number]]    Swab from Nasopharynx    Final result Component Value   No component results             12/14/2022 1714 12/14/2022 1757 COVID-19, ABBOTT IN-HOUSE,NASAL Swab (NO TRANSPORT MEDIA) 2 HR TAT - Swab, Nasopharynx [[id number]]    Swab from Nasopharynx    Final result Component Value   COVID19 Presumptive Negative             11/30/2022 1151 12/14/2022 1330 Fungus Culture - Tissue, Knee, Right [[id number]]   Tissue from Knee, Right    Preliminary result Component Value   Fungus Culture No fungus isolated at 2 weeks P             11/30/2022 1151 12/03/2022 1213 Tissue / Bone Culture - Tissue, Knee, Right [[id number]]   Tissue from Knee, Right    Final result Component Value   Tissue Culture No growth at 3 days   Gram Stain Few (2+) WBCs seen    No organisms seen             11/30/2022 1151 12/10/2022 1200 Anaerobic Culture 10  Day Incubation - Tissue, Knee, Right [[id number]]   Tissue from Knee, Right    Final result Component Value   Anaerobic Culture No anaerobes isolated at 10 days             11/30/2022 1149 12/14/2022 1330 Fungus Culture - Tissue, Knee, Right [[id number]]   Tissue from Knee, Right    Preliminary result Component Value   Fungus Culture No fungus isolated at 2 weeks P             11/30/2022 1149 12/03/2022 1213 Tissue / Bone Culture - Tissue, Knee, Right [[id number]]   Tissue from Knee, Right    Final result Component Value   Tissue Culture No growth at 3 days   Gram Stain Few (2+) WBCs seen    No organisms seen             11/30/2022 1149 12/14/2022 1330 AFB Culture - Tissue, Knee, Right [[id number]]   Tissue from Knee, Right    Preliminary result Component Value   AFB Culture No AFB isolated at 2 weeks P   AFB Stain No acid fast bacilli seen on concentrated smear P             11/30/2022 1149 12/10/2022 1200 Anaerobic Culture 10 Day Incubation - Tissue, Knee, Right [[id number]]   Tissue from Knee, Right    Final result Component Value   Anaerobic Culture No anaerobes isolated at 10 days             11/30/2022 1140 12/14/2022 1330 Fungus Culture - Synovium, Knee, Right [[id number]]   Synovium from Knee, Right    Preliminary result Component Value   Fungus Culture No fungus isolated at 2 weeks P             11/30/2022 1140 12/03/2022 1212 Tissue / Bone Culture - Synovium, Knee, Right [[id number]]   Synovium from Knee, Right    Final result Component Value   Tissue Culture No growth at 3 days   Gram Stain Few (2+) WBCs seen    No organisms seen             11/30/2022 1140 12/14/2022 1330 AFB Culture - Synovium, Knee, Right [[id number]]   Synovium from Knee, Right    Preliminary result Component Value   AFB Culture No AFB isolated at 2 weeks P   AFB Stain No acid fast bacilli seen on concentrated smear P             11/30/2022 1140 12/10/2022 1200 Anaerobic Culture 10 Day Incubation - Synovium, Knee, Right [[id number]]   Synovium  "from Knee, Right    Final result Component Value   Anaerobic Culture No anaerobes isolated at 10 days             2022 1256 2022 1516 MRSA Screen, PCR (Inpatient) - Swab, Nares [[id number]]    Swab from Nares    Final result Component Value   MRSA PCR Negative                       Physical therapy  Plan of Care Reviewed With: patient  Outcome Evaluation: Pt continues with decreased activity tolerance, balance deficits, decreased functional strength, and pain limiting mobility. Pt performed bed mobility mod-I, STS CGA, and ambulated 124' CGA w/ a RW demonstrating a forward flexed posture and slow, antalgic gait pattern. Continue PT POC.                Discharge Assessment:    Vital Signs  Visit Vitals  /62 (BP Location: Left arm, Patient Position: Lying)   Pulse 70   Temp 98.2 °F (36.8 °C) (Oral)   Resp 18   Ht 177.8 cm (70\")   Wt 88.9 kg (196 lb)   SpO2 96%   BMI 28.12 kg/m²     Temp (24hrs), Av.1 °F (36.7 °C), Min:97.8 °F (36.6 °C), Max:98.3 °F (36.8 °C)      General Appearance:    Alert, cooperative, in no acute distress   Lungs:     Clear to auscultation,respirations regular, even and                   unlabored    Heart:    Regular rhythm and normal rate, normal S1 and S2    Abdomen:     Normal bowel sounds, no masses, no organomegaly, soft        non-tender, non-distended, no guarding, no rebound                 tenderness   Extremities:   CDI incision of right LE. Sutures are out.   Left foot with TMA.   Pulses:   Pulses palpable and equal bilaterally   Skin:   No bleeding, bruising or rash            Discharge Disposition: [facility].          Discharge Medications      New Medications      Instructions Start Date   ceftaroline  Commonly known as: TEFLARO   600 mg, Intravenous, Every 12 Hours Scheduled      meloxicam 15 MG tablet  Commonly known as: MOBIC   15 mg, Oral, Daily      vancomycin   1,250 mg, Intravenous, Every 12 Hours         Changes to Medications      Instructions Start Date "   aspirin 81 MG EC tablet  What changed:   · when to take this  · additional instructions   81 mg, Oral, Every 12 Hours Scheduled, Then resume daily      midodrine 2.5 MG tablet  Commonly known as: PROAMATINE  What changed: additional instructions   2.5 mg, Oral, 3 Times Daily Before Meals, Hold if SBP is above 120         Continue These Medications      Instructions Start Date   acetaminophen 325 MG tablet  Commonly known as: TYLENOL   650 mg, Oral, Every 4 Hours PRN      ascorbic acid 500 MG tablet  Commonly known as: VITAMIN C   500 mg, Oral, Daily      bisacodyl 10 MG suppository  Commonly known as: DULCOLAX   10 mg, Rectal, Daily PRN      cyclobenzaprine 5 MG tablet  Commonly known as: FLEXERIL   5 mg, Oral, 3 Times Daily PRN      docusate sodium 100 MG capsule   100 mg, Oral, 2 Times Daily      fludrocortisone 0.1 MG tablet   0.05 mg, Oral, Daily      HYDROcodone-acetaminophen 5-325 MG per tablet  Commonly known as: NORCO   1 tablet, Oral, Every 8 Hours PRN      Insulin Aspart 100 UNIT/ML injection  Commonly known as: novoLOG   0-14 Units, Subcutaneous, 3 Times Daily Before Meals      Insulin Aspart 100 UNIT/ML injection  Commonly known as: novoLOG   4 Units, Subcutaneous, 3 Times Daily Before Meals      insulin detemir 100 UNIT/ML injection  Commonly known as: LEVEMIR   17 Units, Subcutaneous, Every 12 Hours Scheduled      levothyroxine 25 MCG tablet  Commonly known as: SYNTHROID, LEVOTHROID   25 mcg, Oral, Every Early Morning      magic barrier cream   1 application, Topical, As Needed      multivitamin with minerals tablet tablet   1 tablet, Oral, Daily      pantoprazole 40 MG EC tablet  Commonly known as: PROTONIX   40 mg, Oral, Every Early Morning      polyethylene glycol 17 g packet  Commonly known as: MIRALAX   17 g, Oral, Daily      pregabalin 100 MG capsule  Commonly known as: LYRICA   100 mg, Oral, Every 12 Hours Scheduled         Stop These Medications    daptomycin solution IVPB  Commonly known as:  CUBICIN     Diclofenac Sodium 1 % gel gel  Commonly known as: VOLTAREN               Diet Instructions    Regular diet as tolerated       Diabetic diet.     Activity at Discharge: Weight bearing as tolerated RLE.  Boot for left foot with ambulation.     Follow-up Appointments     Orthopedic surgery, .   will follow at [facility]>    Discharge took over 30 min       Rose Diaz MD  12/15/22  09:43 EST              Electronically signed by Rose Diaz MD at 12/15/22 0928

## 2022-12-15 NOTE — PROGRESS NOTES
[facility] Progress Note        Antibiotics:  Anti-Infectives (From admission, onward)    Ordered     Dose/Rate Route Frequency Start Stop    12/09/22 1158  vancomycin 1250 mg/250 mL 0.9% NS IVPB ([facility])        Ordering Provider: Eulogio Verdugo, PharmD    1,250 mg  over 90 Minutes Intravenous Every 12 Hours 12/09/22 1230 01/08/23 0044    12/02/22 1159  ceftaroline (TEFLARO) 600 mg/100 mL 0.9% NS ([facility])        Ordering Provider: Bryce Euceda MD    600 mg Intravenous Every 12 Hours Scheduled 12/02/22 1500 01/08/23 2059    12/01/22 0933  vancomycin 1750 mg/500 mL 0.9% NS IVPB ([facility])        Ordering Provider: Bryce Euceda MD    20 mg/kg × 88.9 kg  over 120 Minutes Intravenous Once 12/01/22 1030 12/01/22 1310    12/01/22 0919  Pharmacy to dose vancomycin        Ordering Provider: Bryce Euceda MD     Does not apply Continuous PRN 12/01/22 0919 12/29/22 0918    11/30/22 1449  ceFAZolin in dextrose (ANCEF) IVPB solution 2 g        Ordering Provider: Brain Bentley MD    2 g  over 30 Minutes Intravenous Every 8 Hours 11/30/22 1800 12/01/22 0151    11/30/22 0815  ceFAZolin in dextrose (ANCEF) IVPB solution 2 g        Ordering Provider: Brain Bentley MD    2 g  over 30 Minutes Intravenous Once 11/30/22 0817 11/30/22 1045    11/30/22 0815  vancomycin 1250 mg/250 mL 0.9% NS IVPB ([facility])        Ordering Provider: Brain Bentley MD    15 mg/kg × 88.9 kg Intravenous Once 11/30/22 0817 11/30/22 0902          CC: fatigue    HPI:    Patient is a 57 y.o.  Yr old male with history of cirrhosis/diabetes and other comorbidity as outlined below.  History of left foot gas gangrene with osteomyelitis and MRSA bacteremia treated in [place] by Dr. Giordano in May/June 2022.  Family reports left transmetatarsal amputation in approximately 8 weeks IV vancomycin.  Notes from [place] indicate transthoracic echocardiogram no vegetation per Dr. Giordano; he thinks he might of had several weeks of oral antibiotic after that but not  entirely clear.  He is admitted to [facility] September 20 with progressive right knee pain occurring over the past week preceding admission.  He thinks he might of twisted it but ended up with progressive redness/swelling and pain.  No specific blunt force or penetrating trauma.  he was evaluated by orthopedics and taken to the operating room for right knee incision/drainage and concern for septic arthritis per Dr. Bentley; blood cultures had  MRSA.  Had dysuria but urinalysis not consistent with UTI.  No hematuria or pyuria     9/23 with TV echodensity; 9/26/22  TYRESE no vegetation per cardiology; daptomycin maintained, transferred to [place] with care taken over by Dr. Giordano; repeat blood cultures there on October 18 and October 20 and October 22 (dapto STU = 1)  with MRSA; blood cultures on October 24 and November 12 were negative per microbiology.  Repeat blood cultures November 20 with MRSA and daptomycin STU equal to 3, not susceptible per my discussion with microbiology; blood cultures positive again on November 22 but negative November 23. per Dr Giordano, teflaro had been added and concern regarding right knee with increased swelling/pain prompted transitioned back to [place] for further right knee surgery on November 30 by Dr Bentley     **I d/w Dr Giordano; he felt there was an obvious right arm PICC line infection in October with changes at the exit site and concern it was the reason for positive blood cultures at that time.  No catheter tip culture available per micro.  In November, recurrent bacteremia associated with right knee swelling/pain but no other focal symptoms per my discussion with Dr. Giordano; at risk for sequestered/metastatic focus    12/5/22 d/w Dr Giordano and  plans to get back to [facility];  Dr Giordano will facilitate additional radiographic workup if needed depending on clinical course    12/8/22 discussed with Dr. Giordano and Dr SHEEHAN again     12/9/22 d/w Dr SHEEHAN;  He came from [facility]  rehab in [place] per him; he had been given teflaro there previously per Dr Giordano    12/11/22 vancomycin level of 35 reflects vancomycin peak per pharmacy.  No new adverse effect per nursing.     12/15/22   Possible [facility] per case management, they anticipate today.    No new distress per nursing.  Doing okay overall. he denies any new focal pain or symptomatology. postop right knee with controlled pain,  dull at present,  Better controlled per nursing overall, nonradiating, worse with movement, better with pain meds and 2 out of 10 in severity at present; he denies any other focal musculoskeletal pain.  No new back pain but he does relate chronic lumbar pain, dull at present, worse with movement, not progressive and no new weakness or numbness. No myalgia     Left foot with no redness/swelling or pain.  Surgical site healed with no open wound or active drainage. Has a left arm midline     No headache photophobia or neck stiffness.  No nausea vomiting diarrhea or abdominal pain.  No shortness of breath cough or hemoptysis.  No other new skin rash.  No other recent procedures or interventions.    No indwelling pacemaker        ROS:      12/15/22 No f/c/s. No n/v/d. No rash. No new ADR to Abx.     Constitutional-- No Fever, chills or sweats.  Appetite diminished with fatigue.  Heent-- No new vision, hearing or throat complaints.  No epistaxis or oral sores.  Denies odynophagia or dysphagia.  No flashers, floaters or eye pain.   No headache, photophobia or neck stiffness.  CV-- No chest pain, palpitation or syncope  Resp-- No SOB/cough/Hemoptysis  GI- No nausea, vomiting, or diarrhea.  No hematochezia, melena, or hematemesis. Denies jaundice  -- No dysuria, hematuria, or flank pain.  Denies hesitancy, urgency or flank pain.  Lymph- no swollen lymph nodes in neck/axilla or groin.   Heme- No active bruising or bleeding; no Hx of DVT or PE.  MS-- no swelling or pain in the bones or joints of arms/legs.  No new back  "pain.  Neuro-- No acute focal weakness or numbness in the arms or legs.  No seizures.     Full 12 point review of systems reviewed and negative otherwise for acute complaints, except for above      PE: nursing/chaperone present  /62 (BP Location: Left arm, Patient Position: Lying)   Pulse 70   Temp 98.2 °F (36.8 °C) (Oral)   Resp 18   Ht 177.8 cm (70\")   Wt 88.9 kg (196 lb)   SpO2 96%   BMI 28.12 kg/m²     GENERAL: Awake and alert, in no acute distress.     HEENT: Normocephalic, atraumatic.    No conjunctival injection. No icterus. Oropharynx clear without evidence of thrush or exudate. No evidence of peridontal disease.   HEART: RRR; soft murmur LSB, rubs, gallops.   LUNGS: Diminished at bases but otherwise clear to auscultation bilaterally without wheezing, rales, rhonchi. Normal respiratory effort. Nonlabored. No dullness.  ABDOMEN: Soft, nontender, nondistended. Positive bowel sounds. No rebound or guarding. NO mass or HSM.  EXT:  No cyanosis, clubbing or edema. No cord.   MSK: FROM without joint effusions noted arms/legs.    SKIN: Warm and dry without cutaneous eruptions on Inspection/palpation.    NEURO: Oriented to PPT. No focal deficits on motor/sensory exam at arms/legs.     Left foot with no redness induration or warmth.  No discrete mass bulge or fluctuance.  No crepitus or bulla.  Prior surgical site healed at [facility].  No open wound or active drainage     Right knee postop surgical site no new redness or purulence.    No discrete mass bulge or fluctuance.  No crepitus or bulla. Better range of motion     No peripheral stigmata/phenomena of endocarditis     IV without obvious redness or drainage    Laboratory Data    Results from last 7 days   Lab Units 12/13/22  0452 12/12/22  0347 12/11/22  0358   WBC 10*3/mm3 2.80* 3.07* 3.39*   HEMOGLOBIN g/dL 7.5* 7.4* 7.5*   HEMATOCRIT % 24.4* 24.0* 24.0*   PLATELETS 10*3/mm3 82* 85* 79*     Results from last 7 days   Lab Units 12/13/22 0452   SODIUM " "mmol/L 140   POTASSIUM mmol/L 4.1   CHLORIDE mmol/L 109*   CO2 mmol/L 25.0   BUN mg/dL 15   CREATININE mg/dL 0.72*   GLUCOSE mg/dL 169*   CALCIUM mg/dL 7.6*     Results from last 7 days   Lab Units 12/13/22  0452   ALK PHOS U/L 257*   BILIRUBIN mg/dL 0.4   ALT (SGPT) U/L 29   AST (SGOT) U/L 48*               Estimated Creatinine Clearance: 127.1 mL/min (A) (by C-G formula based on SCr of 0.72 mg/dL (L)).      Microbiology:      Radiology:  Imaging Results (Last 72 Hours)     ** No results found for the last 72 hours. **            Impression:       --Acute/recurrent/persistent MRSA bacteremia/septicemia; prior history positive cultures May 2022 and recurrent despite prior  IV vancomycin and left foot surgery in addition to other supportive measures.   TTE with ? TV echodensity prior admit in September and TYRESE no vegetation at that time at [facility];   blood culture positivity again at [facility] in October/November as above.  Daptomycin STU increased to 3 on 11/20 isolate and not sensitive to daptomycin per microbiology.  Discussed on several occasions with Dr. Giordano; had a repeat TYRESE on October 31 with mention of \" moderate size echodense structure above the posterior tricuspid leaflet but not attached to the leaflet\" per cardiology there, described potentially as a \"normal variant\" per their note.  High risk for further serious morbidity and other serious sequela including persistent/progressive or recurrent infection, metastatic foci of involvement and other dire consequences;  vancomycin sensitivity maintained albeit with STU =2; adjusted to vancomycin/ceftaroline in light of daptomycin resistance and I asked microbiology     **Blood cultures November 23 negative so far    **repeat TYRESE 12/1 ; no definitive vegetation per cardiology and only mild tricuspid valve regurgitation stable from prior exam per them.  Ill-defined area in the right atrium appeared stable to them. D/w Dr Trent; you still may need to " consider PET scan and likely to require repeat echocardiography during his course.  I have discussed with Dr. Giordano and he will arrange     --Acute right knee pain/septic arthritis with surgical intervention, incision/debridement by Dr. Bentley on September 21.   MRSA+ in the setting of MRSA bacteremia at that time;  Repeat surgery 11/30, d/w Dr Bentley. Cultures November 30 negative     --History of cirrhosis by records.  Unclear etiology.   further GI referral/eval per  medicine team     --Diabetes.  You need to tightly control blood sugar to give best chance for healing.;  Described as uncontrolled by medicine team at admission     -- Chronic lumbar back pain for decades.  This is not new, not worse and no new location.  No new exacerbation or new neurologic symptomatology in his extremities.  MRI 12/3 suboptimal per radiology;  may require further imaging depending on his clinical course to help exclude any other sequestered/spinal-paraspinal focus depending on clinical course     -- cytopenias/TCP ; monitor; White blood cell count trended down a bit.   He did have a white blood cell count of 2.85 in mid November prior to transfer here.  Continue to monitor.  This may be a chronic issue     PLAN:       -- IV vancomycin/ceftaroline potentially 6 weeks but to depend on clinical course/study results and response to therapy.  High risk for occult endovascular focus with multiple past positive blood cultures, most recently November 20 and 22; after IV antibiotics, depending on clinical course, may require chronic oral suppression step down (likely to be decided by Dr. Giordano )     -- Check/review labs cultures and scans     -- Partial history per nursing staff     --d/w pharmacy       -- Highly complex set of issues with high risk for further serious morbidity and other serious sequela     -- Discussed with microbiology; they are doing further assessment to blood cultures drawn November 22     --d/w  Dr SHEEHAN     --  leora  PICC line,   No specific new pain or other suppurative changes at the surface at either site.  I discussed with Dr. Trent regarding additional diagnostics for endocarditis such as PET scan; this still may need to be considered as outpatient depending on clinical course.  see above.    --monitor cell counts     **MRI's noted without specific focus at Lumbar spine or foot although spinal imaging suboptimal per radiology; I d/w Dr Giordano with plans for transition back to LTACH there; depending on clinical course/symptomatology, Dr. Giordano will consider white blood cell scan versus repeat imaging to evaluate for potential occult focus.    **  possible LTACH at [facility] today; May require white blood cell scan  At some point but currently clinically stable    **Copied text in this note has been reviewed and is accurate as of 12/15/22.        Bryce Euceda MD  12/15/2022       none

## 2022-12-15 NOTE — THERAPY TREATMENT NOTE
Patient Name: Melchor Hardwick III  : 1965    MRN: [id number]                              Today's Date: 12/15/2022       Admit Date: 2022    Visit Dx:     ICD-10-CM ICD-9-CM   1. Infection of right knee (HCC)  M00.9 686.9   2. History of osteomyelitis  Z87.39 V13.59     Patient Active Problem List   Diagnosis   • Hyperlipidemia   • Hypertensive disorder   • Obesity   • Type 2 diabetes mellitus with hyperglycemia, with long-term current use of insulin (HCC)   • Gas gangrene of foot (HCC)   • Cellulitis in diabetic foot (HCC)   • Other acute osteomyelitis of left foot (HCC)   • Osteomyelitis (HCC)   • Critical illness myopathy   • Staphylococcal arthritis of right knee (HCC)   • Other cirrhosis of liver (HCC)   • MRSA bacteremia   • Endocarditis of tricuspid valve   • Wound of right buttock   • History of osteomyelitis   • Debility   • Infection of right knee (HCC)   • S/P right knee implant removal with insertion of antibiotic spacer   • Hypothyroid   • MRSA sepsis   • Chronic low back pain   • PICC (peripherally inserted central catheter) in place     Past Medical History:   Diagnosis Date   • Diabetes mellitus (HCC)     4 times per day gets checked for sugar at rehab   • Dizzy    • Hyperlipidemia    • Hypertension    • Hypothyroid 2022   • MRSA infection     blood -      • Orthostatic hypotension    • Pressure sore on buttocks     top crack -  sees wound - but now rn take care of it at rehab - minor opening - dime size - pat nurse didnt assess-  pt states getting better   • Pyogenic arthritis of right knee joint, due to unspecified organism ([facility]) 2022   • Sepsis (HCC)    • Wears glasses     readers     Past Surgical History:   Procedure Laterality Date   • ABSCESS DRAINAGE  2004    PERINEUM   • AMPUTATION FOOT Left 2022    Procedure: AMPUTATION FOOT;  Surgeon: Addison Colby MD;  Location: [facility];  Service: Podiatry;  Laterality: Left;   • INCISION AND DRAINAGE LEG Left  05/10/2022    Procedure: INCISION AND DRAINAGE LOWER EXTREMITY;  Surgeon: Addison Colby MD;  Location:  [facility];  Service: Podiatry;  Laterality: Left;   • KNEE INCISION AND DRAINAGE Right 09/21/2022    Procedure: KNEE INCISION AND DRAINAGE RIGHT;  Surgeon: Brain Bentley MD;  Location:  [facility];  Service: Orthopedics;  Laterality: Right;   • PERIPHERALLY INSERTED CENTRAL CATHETER INSERTION Left    • TOTAL KNEE  PROSTHESIS REMOVAL W/ SPACER INSERTION Right 11/30/2022    Procedure: ANTIBIOTIC SPACER PLACEMENT KNEE - RIGHT;  Surgeon: Brain Bentley MD;  Location:  [facility];  Service: Orthopedics;  Laterality: Right;   • WOUND DEBRIDEMENT Left 05/13/2022    Procedure: DEBRIDEMENT FOOT;  Surgeon: Addison Colby MD;  Location:  [facility];  Service: Podiatry;  Laterality: Left;      General Information     Row Name 12/15/22 1029          OT Time and Intention    Document Type therapy note (daily note)  -AN     Mode of Treatment occupational therapy  -AN     Row Name 12/15/22 1029          General Information    Patient Profile Reviewed yes  -AN     Existing Precautions/Restrictions fall;orthostatic hypotension;other (see comments)  walking boot per L metatarsal amputation, abdominal binder per orthostatic hypotension, medial coccyx pressure injury  -AN     Barriers to Rehab medically complex;previous functional deficit  -AN     Row Name 12/15/22 1029          Cognition    Orientation Status (Cognition) oriented x 4  -AN     Row Name 12/15/22 1029          Safety Issues, Functional Mobility    Safety Issues Affecting Function (Mobility) friction/shear risk  -AN     Impairments Affecting Function (Mobility) pain;range of motion (ROM);balance;endurance/activity tolerance  -AN           User Key  (r) = Recorded By, (t) = Taken By, (c) = Cosigned By    Initials Name Provider Type    AN Lori Oden OT Occupational Therapist                 Mobility/ADL's     Row Name 12/15/22 1031          Bed Mobility    Bed  Mobility scooting/bridging;supine-sit-supine;rolling left;rolling right  -AN     Rolling Left [place] (Bed Mobility) modified independence  -AN     Rolling Right [place] (Bed Mobility) modified independence  -AN     Scooting/Bridging [place] (Bed Mobility) modified independence  -AN     Supine-Sit-Supine [place] (Bed Mobility) modified independence  -AN     Assistive Device (Bed Mobility) bed rails;head of bed elevated  -AN     Comment, (Bed Mobility) rolling L and R for donning of shorts  -AN     Row Name 12/15/22 1031          Transfers    Comment, (Transfers) declined at this time  -AN     Row Name 12/15/22 1031          Activities of Daily Living    BADL Assessment/Intervention upper body dressing;grooming;lower body dressing  -AN     Row Name 12/15/22 1031          Mobility    Extremity Weight-bearing Status left lower extremity;right lower extremity  -AN     Left Lower Extremity (Weight-bearing Status) weight-bearing as tolerated (WBAT);other (see comments)  in walking boot  -AN     Right Lower Extremity (Weight-bearing Status) weight-bearing as tolerated (WBAT)  -AN     Row Name 12/15/22 1031          Upper Body Dressing Assessment/Training    [place] Level (Upper Body Dressing) don;pull-over garment;set up  -AN     Position (Upper Body Dressing) edge of bed sitting  -AN     Row Name 12/15/22 1031          Lower Body Dressing Assessment/Training    [place] Level (Lower Body Dressing) don;supervision;pants/bottoms  -AN     Assistive Devices (Lower Body Dressing) reacher  -AN     Position (Lower Body Dressing) sitting up in bed  -AN     Comment, (Lower Body Dressing) pt donned shorts using reacher in order to thread b/l feet sitting upright in bed  -AN     Row Name 12/15/22 1031          Grooming Assessment/Training    [place] Level (Grooming) set up  -AN     Position (Grooming) edge of bed sitting  -AN     Comment, (Grooming) donned deodorant sitting EOB  -AN           User  Key  (r) = Recorded By, (t) = Taken By, (c) = Cosigned By    Initials Name Provider Type    Lori Jurado OT Occupational Therapist               Obj/Interventions     Row Name 12/15/22 1037          Shoulder (Therapeutic Exercise)    Shoulder (Therapeutic Exercise) AROM (active range of motion)  -AN     Shoulder AROM (Therapeutic Exercise) bilateral;flexion;extension;aBduction;aDduction;sitting;10 repetitions;scapular retraction  -AN     Row Name 12/15/22 1037          Elbow/Forearm (Therapeutic Exercise)    Elbow/Forearm (Therapeutic Exercise) AROM (active range of motion)  -AN     Elbow/Forearm AROM (Therapeutic Exercise) bilateral;flexion;extension;sitting;10 repetitions  -AN     Row Name 12/15/22 1037          Motor Skills    Therapeutic Exercise shoulder;elbow/forearm  -AN           User Key  (r) = Recorded By, (t) = Taken By, (c) = Cosigned By    Initials Name Provider Type    Lori Jurado OT Occupational Therapist               Goals/Plan    No documentation.                Clinical Impression     Row Name 12/15/22 1038          Pain Assessment    Pretreatment Pain Rating 0/10 - no pain  -AN     Posttreatment Pain Rating 0/10 - no pain  -AN     Pre/Posttreatment Pain Comment asymptomatic  -AN     Pain Intervention(s) Repositioned;Ambulation/increased activity  -AN     Row Name 12/15/22 1038          Plan of Care Review    Plan of Care Reviewed With patient  -AN     Progress no change  -AN     Outcome Evaluation Pt participated in EOB ADLs with use of AE and UE ther-ex. Pain 0/10 throughout. VSS. Pt declined OOB activity and transfering to chair at this time. Cont POC.  -AN     Row Name 12/15/22 1038          Therapy Plan Review/Discharge Plan (OT)    Anticipated Discharge Disposition (OT) LT (long-term care hospital)  -AN     Row Name 12/15/22 1038          Vital Signs    Pre Systolic BP Rehab --  VSS  -AN     O2 Delivery Pre Treatment room air  -AN     O2 Delivery Intra Treatment room air   -AN     O2 Delivery Post Treatment room air  -AN     Pre Patient Position Supine  -AN     Intra Patient Position Sitting  -AN     Post Patient Position Supine  -AN     Row Name 12/15/22 1038          Positioning and Restraints    Pre-Treatment Position in bed  -AN     Post Treatment Position bed  -AN     In Bed notified nsg;supine;exit alarm on;encouraged to call for assist;call light within reach;side rails up x2;waffle boots/both;SCD pump applied  -AN           User Key  (r) = Recorded By, (t) = Taken By, (c) = Cosigned By    Initials Name Provider Type    Lori Jurado OT Occupational Therapist               Outcome Measures     Row Name 12/15/22 1041          How much help from another is currently needed...    Putting on and taking off regular lower body clothing? 3  -AN     Bathing (including washing, rinsing, and drying) 2  -AN     Toileting (which includes using toilet bed pan or urinal) 2  -AN     Putting on and taking off regular upper body clothing 3  -AN     Taking care of personal grooming (such as brushing teeth) 3  -AN     Eating meals 4  -AN     AM-PAC 6 Clicks Score (OT) 17  -AN     Row Name 12/15/22 1041          Functional Assessment    Outcome Measure Options AM-PAC 6 Clicks Daily Activity (OT)  -AN           User Key  (r) = Recorded By, (t) = Taken By, (c) = Cosigned By    Initials Name Provider Type    Lori Jurado OT Occupational Therapist                Occupational Therapy Education     Title: PT OT SLP Therapies (Done)     Topic: Occupational Therapy (Done)     Point: ADL training (Done)     Description:   Instruct learner(s) on proper safety adaptation and remediation techniques during self care or transfers.   Instruct in proper use of assistive devices.              Learning Progress Summary           Patient Acceptance, E, VU by ROBLES at 12/15/2022 1041    Acceptance, D,E, NR by ALTHEA at 12/13/2022 1413    Eager, YANELY, VU,NR by SC at 12/7/2022 1156    Comment: reviewed ROM  exercise    Acceptance, TB,E,D, VU,NR by  at 12/7/2022 1045    Acceptance, E,D, NR by J at 12/2/2022 0751                   Point: Home exercise program (Done)     Description:   Instruct learner(s) on appropriate technique for monitoring, assisting and/or progressing therapeutic exercises/activities.              Learning Progress Summary           Patient Acceptance, E, VU by  at 12/15/2022 1041    Eager, E, VU,NR by SC at 12/7/2022 1156    Comment: reviewed ROM exercise                   Point: Precautions (Done)     Description:   Instruct learner(s) on prescribed precautions during self-care and functional transfers.              Learning Progress Summary           Patient Acceptance, E, VU by  at 12/15/2022 1041    Acceptance, D,E, NR by J at 12/13/2022 1413    Eager, E, VU,NR by SC at 12/7/2022 1156    Comment: reviewed ROM exercise    Acceptance, TB,E,D, VU,NR by  at 12/7/2022 1045    Acceptance, E,D, NR by  at 12/2/2022 0751                   Point: Body mechanics (Done)     Description:   Instruct learner(s) on proper positioning and spine alignment during self-care, functional mobility activities and/or exercises.              Learning Progress Summary           Patient Acceptance, E, VU by  at 12/15/2022 1041    Acceptance, D,E, NR by  at 12/13/2022 1413    Eager, E, VU,NR by SC at 12/7/2022 1156    Comment: reviewed ROM exercise    Acceptance, TB,E,D, VU,NR by  at 12/7/2022 1045    Acceptance, E,D, NR by  at 12/2/2022 0751                               User Key     Initials Effective Dates Name Provider Type Discipline    SC 06/16/21 -  Orion Reyes, PT Physical Therapist PT     10/25/22 -  Tonja Amato, OT Occupational Therapist OT    JY 06/16/21 -  Jaja Suresh OT Occupational Therapist OT    AN 09/21/21 -  Lori Oden OT Occupational Therapist OT              OT Recommendation and Plan     Plan of Care Review  Plan of Care Reviewed With: patient  Progress: no  change  Outcome Evaluation: Pt participated in EOB ADLs with use of AE and UE ther-ex. Pain 0/10 throughout. VSS. Pt declined OOB activity and transfering to chair at this time. Cont POC.     Time Calculation:    Time Calculation- OT     Row Name 12/15/22 1041             Time Calculation- OT    OT Start Time 0930  -AN      OT Received On 12/15/22  -AN         Timed Charges    54002 - OT Therapeutic Exercise Minutes 5  -AN      69248 - OT Self Care/Mgmt Minutes 15  -AN         Total Minutes    Timed Charges Total Minutes 20  -AN       Total Minutes 20  -AN            User Key  (r) = Recorded By, (t) = Taken By, (c) = Cosigned By    Initials Name Provider Type    AN Lori Oden OT Occupational Therapist              Therapy Charges for Today     Code Description Service Date Service Provider Modifiers Qty    [id number] HC OT SELF CARE/MGMT/TRAIN EA 15 MIN 12/15/2022 Lori Oden OT GO 1               Lori Oden OT  12/15/2022

## 2022-12-15 NOTE — DISCHARGE SUMMARY
Patient Name: Melchor Hardwick III  MRN: [id number]  : 1965  DOS: 12/15/2022    Attending: Brain Bentley MD    Primary Care Provider: Missy Up APRN    Date of Admission:.2022  7:25 AM    Date of Discharge:  12/15/2022    Discharge Diagnosis:     S/P right knee implant removal with insertion of antibiotic spacer    Type 2 diabetes mellitus with hyperglycemia, with long-term current use of insulin (HCC)    Infection of right knee (HCC)    Hypothyroid    S/p MRSA sepsis, recurrent    Chronic low back pain    PICC (peripherally inserted central catheter) in place    Pancytopenia, stable.      Consults: Infectious disease, Bryce Euceda MD  Hospital Course     At admit:  Patient is a pleasant 57 y.o. male presented for scheduled surgery by Dr. Bentley.  He underwent right knee implant removal with insertion of antibiotic spacer under spinal anesthesia.  He tolerated surgery well and was admitted for further medical management.  He was admitted to [facility] on 2022 with pyrogenic arthritis of his right knee.  He underwent incision and drainage by Dr. Bentley on 22.  He was seen by infectious disease and was started on IV antibiotics via PICC line.  He had TYRESE and was found to have some tricuspid vegetation.  He was seen by CT surgery who recommended follow-up TYRESE after 2-4 weeks.    He was admitted to [facility] 10/28/2022 with right knee septic arthritis and osteomyelitis.  He had left transmetatarsal mutation in May of this year due to infection.  He was considerably debilitated at time of admission.     He states that about 2 weeks ago despite continuing daily IV antibiotic therapy his right knee became swollen painful and warm to touch.  He had fevers and chills as well.  He states his antibiotic regimen was changed and he had minimal relief of pain and swelling.     When seen postop he is doing well.  His pain is well controlled.  He denies nausea, shortness  of breath or chest pain.  No history of DVT or PE.    After admit:  Patient received pain medication as needed for pain control along with peripheral nerve block catheter and ropivacaine infusion, multimodal pain control measures helped during his stay, nerve block catheter has since been removed.    He was seen by physical therapy and Occupational Therapy, participated well, his pain has been under good control.    He received IV antibiotics, he was followed throughout his stay by Dr. Bryce Euceda with [facility].  A PICC line has been placed in expectation of several weeks of IV antibiotics.  Daptomycin has been discontinued due to rising STU.  Patient has been on vancomycin and ceftaroline and has been tolerating these antibiotics.    He had some acute blood loss anemia but has been asymptomatic and he did not require transfusion.  He has mild leukopenia and thrombocytopenia, both of which have been stable.( records indicate Cirrhosis history. Can be followed as outpatient)    During his stay he was maintained on DVT prophylaxis with mechanicals and an aspirin twice daily.    He had further work-up for his bacteremia including MRI of his left foot where he previously had TMA, MRI of his lumbar spine, as well as a TYRESE, none of these have been positive for a source of infection.    Tagged WBC scan is being considered, patient has been stable and this has not been ordered at this point.    During his stay he was maintained on his other medication regimen Including his a regimen, Thyroid replacement, as well as medications for his known orthostatic hypotension, his blood pressure has been stable, I have added holding parameters for his midodrine.    After being declined for admit at [facility] he has been accepted to [facility], he is being transferred there today in good condition.    I have discussed with the patient, Dr. Euceda, as well as with Dr. Garcia the accepting physician at [facility].    Procedures  Performed  Procedure(s):  ANTIBIOTIC SPACER PLACEMENT KNEE - RIGHT       Pertinent Test Results:    I reviewed the patient's new clinical results.   Results from last 7 days   Lab Units 12/13/22 0452 12/12/22 0347 12/11/22  0358   WBC 10*3/mm3 2.80* 3.07* 3.39*   HEMOGLOBIN g/dL 7.5* 7.4* 7.5*   HEMATOCRIT % 24.4* 24.0* 24.0*   PLATELETS 10*3/mm3 82* 85* 79*     Results from last 7 days   Lab Units 12/13/22 0452 12/12/22 0347 12/11/22  0358   SODIUM mmol/L 140 138 141   POTASSIUM mmol/L 4.1 4.3 4.2   CHLORIDE mmol/L 109* 108* 109*   CO2 mmol/L 25.0 26.0 27.0   BUN mg/dL 15 16 16   CREATININE mg/dL 0.72* 0.59* 0.61*   CALCIUM mg/dL 7.6* 7.8* 8.2*   BILIRUBIN mg/dL 0.4 0.4 0.5   ALK PHOS U/L 257* 285* 271*   ALT (SGPT) U/L 29 36 41   AST (SGOT) U/L 48* 58* 72*   GLUCOSE mg/dL 169* 174* 67     I reviewed the patient's new imaging including images and reports.  Collected Updated Procedure Result Status    12/14/2022 1714 12/14/2022 1757 COVID PRE-OP / PRE-PROCEDURE SCREENING ORDER (NO ISOLATION) - Swab, Nasopharynx [[id number]]    Swab from Nasopharynx    Final result Component Value   No component results             12/14/2022 1714 12/14/2022 1757 COVID-19, ABBOTT IN-HOUSE,NASAL Swab (NO TRANSPORT MEDIA) 2 HR TAT - Swab, Nasopharynx [[id number]]    Swab from Nasopharynx    Final result Component Value   COVID19 Presumptive Negative             11/30/2022 1151 12/14/2022 1330 Fungus Culture - Tissue, Knee, Right [[id number]]   Tissue from Knee, Right    Preliminary result Component Value   Fungus Culture No fungus isolated at 2 weeks P             11/30/2022 1151 12/03/2022 1213 Tissue / Bone Culture - Tissue, Knee, Right [[id number]]   Tissue from Knee, Right    Final result Component Value   Tissue Culture No growth at 3 days   Gram Stain Few (2+) WBCs seen    No organisms seen             11/30/2022 1151 12/10/2022 1200 Anaerobic Culture 10 Day Incubation - Tissue, Knee, Right [[id number]]   Tissue from Knee, Right     Final result Component Value   Anaerobic Culture No anaerobes isolated at 10 days             11/30/2022 1149 12/14/2022 1330 Fungus Culture - Tissue, Knee, Right [[id number]]   Tissue from Knee, Right    Preliminary result Component Value   Fungus Culture No fungus isolated at 2 weeks P             11/30/2022 1149 12/03/2022 1213 Tissue / Bone Culture - Tissue, Knee, Right [[id number]]   Tissue from Knee, Right    Final result Component Value   Tissue Culture No growth at 3 days   Gram Stain Few (2+) WBCs seen    No organisms seen             11/30/2022 1149 12/14/2022 1330 AFB Culture - Tissue, Knee, Right [[id number]]   Tissue from Knee, Right    Preliminary result Component Value   AFB Culture No AFB isolated at 2 weeks P   AFB Stain No acid fast bacilli seen on concentrated smear P             11/30/2022 1149 12/10/2022 1200 Anaerobic Culture 10 Day Incubation - Tissue, Knee, Right [[id number]]   Tissue from Knee, Right    Final result Component Value   Anaerobic Culture No anaerobes isolated at 10 days             11/30/2022 1140 12/14/2022 1330 Fungus Culture - Synovium, Knee, Right [[id number]]   Synovium from Knee, Right    Preliminary result Component Value   Fungus Culture No fungus isolated at 2 weeks P             11/30/2022 1140 12/03/2022 1212 Tissue / Bone Culture - Synovium, Knee, Right [[id number]]   Synovium from Knee, Right    Final result Component Value   Tissue Culture No growth at 3 days   Gram Stain Few (2+) WBCs seen    No organisms seen             11/30/2022 1140 12/14/2022 1330 AFB Culture - Synovium, Knee, Right [[id number]]   Synovium from Knee, Right    Preliminary result Component Value   AFB Culture No AFB isolated at 2 weeks P   AFB Stain No acid fast bacilli seen on concentrated smear P             11/30/2022 1140 12/10/2022 1200 Anaerobic Culture 10 Day Incubation - Synovium, Knee, Right [[id number]]   Synovium from Knee, Right    Final result Component Value   Anaerobic Culture No  "anaerobes isolated at 10 days             2022 1256 2022 1516 MRSA Screen, PCR (Inpatient) - Swab, Nares [[id number]]    Swab from Nares    Final result Component Value   MRSA PCR Negative                       Physical therapy  Plan of Care Reviewed With: patient  Outcome Evaluation: Pt continues with decreased activity tolerance, balance deficits, decreased functional strength, and pain limiting mobility. Pt performed bed mobility mod-I, STS CGA, and ambulated 124' CGA w/ a RW demonstrating a forward flexed posture and slow, antalgic gait pattern. Continue PT POC.                Discharge Assessment:    Vital Signs  Visit Vitals  /62 (BP Location: Left arm, Patient Position: Lying)   Pulse 70   Temp 98.2 °F (36.8 °C) (Oral)   Resp 18   Ht 177.8 cm (70\")   Wt 88.9 kg (196 lb)   SpO2 96%   BMI 28.12 kg/m²     Temp (24hrs), Av.1 °F (36.7 °C), Min:97.8 °F (36.6 °C), Max:98.3 °F (36.8 °C)      General Appearance:    Alert, cooperative, in no acute distress   Lungs:     Clear to auscultation,respirations regular, even and                   unlabored    Heart:    Regular rhythm and normal rate, normal S1 and S2    Abdomen:     Normal bowel sounds, no masses, no organomegaly, soft        non-tender, non-distended, no guarding, no rebound                 tenderness   Extremities:   CDI incision of right LE. Sutures are out.   Left foot with TMA.   Pulses:   Pulses palpable and equal bilaterally   Skin:   No bleeding, bruising or rash            Discharge Disposition: [facility].          Discharge Medications      New Medications      Instructions Start Date   ceftaroline  Commonly known as: TEFLARO   600 mg, Intravenous, Every 12 Hours Scheduled      meloxicam 15 MG tablet  Commonly known as: MOBIC   15 mg, Oral, Daily      vancomycin   1,250 mg, Intravenous, Every 12 Hours         Changes to Medications      Instructions Start Date   aspirin 81 MG EC tablet  What changed:   · when to take this  · additional " instructions   81 mg, Oral, Every 12 Hours Scheduled, Then resume daily      midodrine 2.5 MG tablet  Commonly known as: PROAMATINE  What changed: additional instructions   2.5 mg, Oral, 3 Times Daily Before Meals, Hold if SBP is above 120         Continue These Medications      Instructions Start Date   acetaminophen 325 MG tablet  Commonly known as: TYLENOL   650 mg, Oral, Every 4 Hours PRN      ascorbic acid 500 MG tablet  Commonly known as: VITAMIN C   500 mg, Oral, Daily      bisacodyl 10 MG suppository  Commonly known as: DULCOLAX   10 mg, Rectal, Daily PRN      cyclobenzaprine 5 MG tablet  Commonly known as: FLEXERIL   5 mg, Oral, 3 Times Daily PRN      docusate sodium 100 MG capsule   100 mg, Oral, 2 Times Daily      fludrocortisone 0.1 MG tablet   0.05 mg, Oral, Daily      HYDROcodone-acetaminophen 5-325 MG per tablet  Commonly known as: NORCO   1 tablet, Oral, Every 8 Hours PRN      Insulin Aspart 100 UNIT/ML injection  Commonly known as: novoLOG   0-14 Units, Subcutaneous, 3 Times Daily Before Meals      Insulin Aspart 100 UNIT/ML injection  Commonly known as: novoLOG   4 Units, Subcutaneous, 3 Times Daily Before Meals      insulin detemir 100 UNIT/ML injection  Commonly known as: LEVEMIR   17 Units, Subcutaneous, Every 12 Hours Scheduled      levothyroxine 25 MCG tablet  Commonly known as: SYNTHROID, LEVOTHROID   25 mcg, Oral, Every Early Morning      magic barrier cream   1 application, Topical, As Needed      multivitamin with minerals tablet tablet   1 tablet, Oral, Daily      pantoprazole 40 MG EC tablet  Commonly known as: PROTONIX   40 mg, Oral, Every Early Morning      polyethylene glycol 17 g packet  Commonly known as: MIRALAX   17 g, Oral, Daily      pregabalin 100 MG capsule  Commonly known as: LYRICA   100 mg, Oral, Every 12 Hours Scheduled         Stop These Medications    daptomycin solution IVPB  Commonly known as: CUBICIN     Diclofenac Sodium 1 % gel gel  Commonly known as: VOLTAREN                Diet Instructions    Regular diet as tolerated       Diabetic diet.     Activity at Discharge: Weight bearing as tolerated RLE.  Boot for left foot with ambulation.     Follow-up Appointments     Orthopedic surgery, .   will follow at [facility]>    Discharge took over 30 min       Rose Diaz MD  12/15/22  09:43 EST

## 2022-12-15 NOTE — PLAN OF CARE
Problem: Adult Inpatient Plan of Care  Goal: Optimal Comfort and Wellbeing  Outcome: Ongoing, Progressing     Problem: Adult Inpatient Plan of Care  Goal: Readiness for Transition of Care  Outcome: Ongoing, Progressing     Problem: Skin Injury Risk Increased  Goal: Skin Health and Integrity  Outcome: Ongoing, Progressing   Goal Outcome Evaluation: progressing

## 2022-12-15 NOTE — PLAN OF CARE
Goal Outcome Evaluation:  Plan of Care Reviewed With: patient        Progress: no change  Outcome Evaluation: Pt participated in EOB ADLs with use of AE and UE ther-ex. Pain 0/10 throughout. VSS. Pt declined OOB activity and transfering to chair at this time. Cont POC.

## 2022-12-15 NOTE — SIGNIFICANT NOTE
Attempted to call report to facility, spoke with Modesta. Nurse was in another room. Left call back number, awaiting call back.    1056- Attempted to call report, spoke with Dorothy on 4th floor. Nurse still busy, call back number provided. Awaiting call back.     1118- Received call back, report given at this time.

## 2023-01-11 ENCOUNTER — TRANSCRIBE ORDERS (OUTPATIENT)
Dept: PHYSICAL THERAPY | Facility: CLINIC | Age: 58
End: 2023-01-11
Payer: MEDICAID

## 2023-01-11 DIAGNOSIS — Z89.432 STATUS POST AMPUTATION OF LEFT FOOT THROUGH METATARSAL BONE: ICD-10-CM

## 2023-01-11 DIAGNOSIS — M00.9 SEPTIC ARTHRITIS, DUE TO UNSPECIFIED ORGANISM, SEPTIC ARTHRITIS OF UNSPECIFIED LOCATION: Primary | ICD-10-CM

## 2023-01-11 LAB
FUNGUS WND CULT: NORMAL
MYCOBACTERIUM SPEC CULT: NORMAL
MYCOBACTERIUM SPEC CULT: NORMAL
NIGHT BLUE STAIN TISS: NORMAL
NIGHT BLUE STAIN TISS: NORMAL

## 2023-01-16 ENCOUNTER — TREATMENT (OUTPATIENT)
Dept: PHYSICAL THERAPY | Facility: CLINIC | Age: 58
End: 2023-01-16
Payer: MEDICAID

## 2023-01-16 DIAGNOSIS — R26.89 ANTALGIC GAIT: ICD-10-CM

## 2023-01-16 DIAGNOSIS — S98.912D AMPUTATION OF LEFT FOOT, SUBSEQUENT ENCOUNTER: ICD-10-CM

## 2023-01-16 DIAGNOSIS — M00.061 STAPHYLOCOCCAL ARTHRITIS OF RIGHT KNEE: Primary | ICD-10-CM

## 2023-01-16 PROCEDURE — 97162 PT EVAL MOD COMPLEX 30 MIN: CPT | Performed by: PHYSICAL THERAPIST

## 2023-01-16 NOTE — PROGRESS NOTES
Physical Therapy Initial Evaluation and Plan of Care    Patient: Melchor Hardwick III   : 1965  Diagnosis/ICD-10 Code:  Staphylococcal arthritis of right knee ([facility]) [M00.061]  Referring practitioner: Liyah Hill DO  Date of Initial Visit: 2023  Today's Date: 2023  Patient seen for 1 session         Visit Diagnoses:    ICD-10-CM ICD-9-CM   1. Staphylococcal arthritis of right knee ([facility])  M00.061 711.06     041.10   2. Amputation of left foot, subsequent encounter ([facility])  S98.912D V54.89     896.0   3. Antalgic gait  R26.89 781.2         Subjective Questionnaire: LEFS: 53%      Subjective Evaluation    History of Present Illness  Onset date: 2022.  Mechanism of injury: The patient is diabetic and developed infection in his left foot in 2022.  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  The patient developed septic arthritis in the right knee in 2022.  The patient required a right knee open cleanising with antibiotic knee spacer in 2022.   He remained in the hospital and extended care services for approximately four weeks. The patient received rehab at [facility] for ten days and was discharged home. Pt has now been referred to out patient therapy for continued rehabilitation.      Patient Occupation:    Precautions and Work Restrictions: [employer] Quality of life: good    Pain  Current pain ratin  At best pain ratin  At worst pain ratin  Location: right knee and left foot  Quality: dull ache  Relieving factors: medications, ice, heat, relaxation, rest and support  Aggravating factors: movement, stairs, standing, ambulation, squatting and repetitive movement  Progression: improved    Hand dominance: right    Patient Goals  Patient goals for therapy: decreased edema, decreased pain, improved balance, increased motion, increased strength, independence with ADLs/IADLs, return to  sport/leisure activities and return to work             Objective          Observations     Additional Knee Observation Details  Incision on right knee demonstrated good healing; edema noted in both knee and legs  Additional Ankle/Foot Observation Details  Left foot demonstrated good healing along partial amputation    Palpation     Additional Palpation Details  No tenderness with palpation    Neurological Testing     Sensation     Knee   Left Knee   Diminished: light touch     Right Knee   Diminished: light touch     Active Range of Motion   Left Knee   Flexion: 125 degrees   Extension: 0 degrees     Right Knee   Flexion: 89 degrees     Additional Active Range of Motion Details  Right knee lacks 14 degrees from full extension    Strength/Myotome Testing     Left Hip   Planes of Motion   Flexion: 3+    Right Hip   Planes of Motion   Flexion: 3+    Left Knee   Flexion: 4+  Extension: 4-    Right Knee   Flexion: 4  Extension: 3+    Right Ankle/Foot   Dorsiflexion: 3+  Plantar flexion: 4-    Ambulation     Comments   Pt amb with left walking boot and walker with decreased velocity and step length          Assessment & Plan     Assessment  Impairments: abnormal coordination, abnormal gait, abnormal muscle firing, abnormal muscle tone, abnormal or restricted ROM, activity intolerance, impaired balance, impaired physical strength, lacks appropriate home exercise program, pain with function, safety issue and weight-bearing intolerance  Functional Limitations: carrying objects, walking, pushing, uncomfortable because of pain, standing and unable to perform repetitive tasks  Assessment details: Pt is a 58 y/o male referred to therapy following a left partial foot amputation and right knee septic surgery.  Pt presents with LE weakness, impaired balance and increased pain.  Therapy will follow for improved leg stability and improved functional mobility and balance.  Prognosis: good    Goals  Plan Goals: STG 4 weeks    1 Pt  will be instructed in a HEP.  2 Pt will demonstrate 4+/5 gross LE strength.  3 Pt will report pain no greater than 5/10 with ADLs.    LTG 8 weeks    1 Pt will demonstrate right knee ROM 3-110.  2 Pt will report pain no greater than 3/10 with prolonged standing.  3 Pt will amb household distances with sc or lesser AD safely.    Plan  Therapy options: will be seen for skilled therapy services  Planned modality interventions: cryotherapy, electrical stimulation/Russian stimulation, thermotherapy (hydrocollator packs) and ultrasound  Planned therapy interventions: ADL retraining, flexibility, gait training, home exercise program, IADL retraining, joint mobilization, manual therapy, neuromuscular re-education, postural training, soft tissue mobilization, strengthening, stretching, therapeutic activities, transfer training and motor coordination training  Frequency: 2x week  Duration in weeks: 12  Treatment plan discussed with: patient  Plan details: Will follow for optimal gains.    Moderate Evaluation  58436  Therapeutic exercise  97345  Therapeutic activity    58685  Neuromuscular re-education   53091  Manual therapy   96349  Gait training  44586  Attended e-stim  56454  Unattended e-stim (Medicaid/Medicare)     Moist heat/cryotherapy 08487   Ultrasound   19274              Timed:         Manual Therapy:         mins  18482;     Therapeutic Exercise:         mins  24640;     Neuromuscular Mojgan:        mins  87692;    Therapeutic Activity:          mins  29356;     Gait Training:           mins  60918;     Ultrasound:          mins  37056;    Ionto                                   mins   99299  Self Care                            mins   81095  Canalith Repos         mins 83406      Un-Timed:  Electrical Stimulation:         mins  53381 ( );  Dry Needling          mins self-pay  Traction          mins 29207  Low Eval          Mins  13180  Mod Eval     55     Mins  72486  High Eval                             Mins  58811        Timed Treatment:      mins   Total Treatment:     55   mins          PT: Nir Song PT     License Number: [id number]  Electronically signed by Nir Song PT, 01/16/23, 11:16 AM EST    Certification Period: 1/16/2023 thru 4/15/2023  I certify that the therapy services are furnished while this patient is under my care.  The services outlined above are required by this patient, and will be reviewed every 90 days.         Physician Signature:__________________________________________________    PHYSICIAN: Liyah Christianson DO  NPI: [id number]                                      DATE:      Please sign and return via fax to [email or web address] . Thank you, [facility].

## 2023-01-17 ENCOUNTER — OFFICE VISIT (OUTPATIENT)
Dept: INFECTIOUS DISEASES | Facility: CLINIC | Age: 58
End: 2023-01-17
Payer: MEDICAID

## 2023-01-17 VITALS
HEART RATE: 94 BPM | DIASTOLIC BLOOD PRESSURE: 69 MMHG | TEMPERATURE: 98 F | WEIGHT: 216 LBS | HEIGHT: 70 IN | SYSTOLIC BLOOD PRESSURE: 145 MMHG | BODY MASS INDEX: 30.92 KG/M2 | OXYGEN SATURATION: 99 %

## 2023-01-17 DIAGNOSIS — M00.061 STAPHYLOCOCCAL ARTHRITIS OF RIGHT KNEE: ICD-10-CM

## 2023-01-17 DIAGNOSIS — R78.81 MRSA BACTEREMIA: Primary | ICD-10-CM

## 2023-01-17 DIAGNOSIS — I07.9 ENDOCARDITIS OF TRICUSPID VALVE: ICD-10-CM

## 2023-01-17 DIAGNOSIS — B95.62 MRSA BACTEREMIA: Primary | ICD-10-CM

## 2023-01-17 LAB
ALBUMIN SERPL-MCNC: 3.6 G/DL (ref 3.5–5.2)
ALBUMIN/GLOB SERPL: 1.3 G/DL
ALP SERPL-CCNC: 170 U/L (ref 39–117)
ALT SERPL W P-5'-P-CCNC: 24 U/L (ref 1–41)
ANION GAP SERPL CALCULATED.3IONS-SCNC: 7.6 MMOL/L (ref 5–15)
AST SERPL-CCNC: 39 U/L (ref 1–40)
BASOPHILS # BLD AUTO: 0.01 10*3/MM3 (ref 0–0.2)
BASOPHILS NFR BLD AUTO: 0.4 % (ref 0–1.5)
BILIRUB SERPL-MCNC: 0.5 MG/DL (ref 0–1.2)
BUN SERPL-MCNC: 14 MG/DL (ref 6–20)
BUN/CREAT SERPL: 18.4 (ref 7–25)
CALCIUM SPEC-SCNC: 8.7 MG/DL (ref 8.6–10.5)
CHLORIDE SERPL-SCNC: 100 MMOL/L (ref 98–107)
CO2 SERPL-SCNC: 27.4 MMOL/L (ref 22–29)
CREAT SERPL-MCNC: 0.76 MG/DL (ref 0.76–1.27)
CRP SERPL-MCNC: 1.8 MG/DL (ref 0–0.5)
DEPRECATED RDW RBC AUTO: 42.6 FL (ref 37–54)
EGFRCR SERPLBLD CKD-EPI 2021: 104.8 ML/MIN/1.73
EOSINOPHIL # BLD AUTO: 0.07 10*3/MM3 (ref 0–0.4)
EOSINOPHIL NFR BLD AUTO: 2.7 % (ref 0.3–6.2)
ERYTHROCYTE [DISTWIDTH] IN BLOOD BY AUTOMATED COUNT: 14.8 % (ref 12.3–15.4)
GLOBULIN UR ELPH-MCNC: 2.8 GM/DL
GLUCOSE SERPL-MCNC: 198 MG/DL (ref 65–99)
HCT VFR BLD AUTO: 33.7 % (ref 37.5–51)
HGB BLD-MCNC: 10.2 G/DL (ref 13–17.7)
IMM GRANULOCYTES # BLD AUTO: 0.01 10*3/MM3 (ref 0–0.05)
IMM GRANULOCYTES NFR BLD AUTO: 0.4 % (ref 0–0.5)
LYMPHOCYTES # BLD AUTO: 0.62 10*3/MM3 (ref 0.7–3.1)
LYMPHOCYTES NFR BLD AUTO: 23.7 % (ref 19.6–45.3)
MCH RBC QN AUTO: 24.4 PG (ref 26.6–33)
MCHC RBC AUTO-ENTMCNC: 30.3 G/DL (ref 31.5–35.7)
MCV RBC AUTO: 80.6 FL (ref 79–97)
MONOCYTES # BLD AUTO: 0.2 10*3/MM3 (ref 0.1–0.9)
MONOCYTES NFR BLD AUTO: 7.6 % (ref 5–12)
NEUTROPHILS NFR BLD AUTO: 1.71 10*3/MM3 (ref 1.7–7)
NEUTROPHILS NFR BLD AUTO: 65.2 % (ref 42.7–76)
NRBC BLD AUTO-RTO: 0 /100 WBC (ref 0–0.2)
PLATELET # BLD AUTO: 83 10*3/MM3 (ref 140–450)
PMV BLD AUTO: 11.3 FL (ref 6–12)
POTASSIUM SERPL-SCNC: 3.9 MMOL/L (ref 3.5–5.2)
PROT SERPL-MCNC: 6.4 G/DL (ref 6–8.5)
RBC # BLD AUTO: 4.18 10*6/MM3 (ref 4.14–5.8)
SODIUM SERPL-SCNC: 135 MMOL/L (ref 136–145)
WBC NRBC COR # BLD: 2.62 10*3/MM3 (ref 3.4–10.8)

## 2023-01-17 PROCEDURE — 86140 C-REACTIVE PROTEIN: CPT | Performed by: INTERNAL MEDICINE

## 2023-01-17 PROCEDURE — 80053 COMPREHEN METABOLIC PANEL: CPT | Performed by: INTERNAL MEDICINE

## 2023-01-17 PROCEDURE — 85025 COMPLETE CBC W/AUTO DIFF WBC: CPT | Performed by: INTERNAL MEDICINE

## 2023-01-17 PROCEDURE — 99214 OFFICE O/P EST MOD 30 MIN: CPT | Performed by: INTERNAL MEDICINE

## 2023-01-17 RX ORDER — LEVOTHYROXINE SODIUM 0.05 MG/1
50 TABLET ORAL DAILY
COMMUNITY

## 2023-01-17 RX ORDER — DOXYCYCLINE HYCLATE 100 MG
100 TABLET ORAL 2 TIMES DAILY
COMMUNITY

## 2023-01-17 NOTE — PROGRESS NOTES
[facility]         Referring Provider: No referring provider defined for this encounter.    Subjective      Chief Complaint  Wound Infection (Right knee  pt says his knee has been swelling when he walks )    History of Present Illness  Melchor Hardwick III is a 57 y.o. male who presents today to [facility] for Initial Consultation  . 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 Patient now finished his IV antibiotic therapy and remains on lifelong suppressive therapy with doxycyline 100 mg po bid.    Denies any fever, chills, no diarrhea, denies any right knee pain.    Past Medical History:   Diagnosis Date   • Diabetes mellitus (HCC)     4 times per day gets checked for sugar at rehab   • Dizzy    • Hyperlipidemia    • Hypertension    • Hypothyroid 11/30/2022   • MRSA infection     blood -   2022   • Orthostatic hypotension    • Pressure sore on buttocks     top crack -  sees wound - but now rn take care of it at rehab - minor opening - dime size - pat nurse didnt assess-  pt states getting better   • Pyogenic arthritis of right knee joint, due to unspecified organism (HCC) 09/21/2022   • Sepsis (HCC)    • Wears glasses     readers       Past Surgical History:   Procedure Laterality Date   • ABSCESS DRAINAGE  2004    PERINEUM   • AMPUTATION FOOT Left 05/18/2022    Procedure: AMPUTATION FOOT;  Surgeon: Addison Colby MD;  Location: [facility] OR;  Service: Podiatry;  Laterality: Left;   • INCISION AND DRAINAGE LEG Left 05/10/2022    Procedure: INCISION AND DRAINAGE LOWER EXTREMITY;  Surgeon: Addison Colby MD;  Location: [facility] OR;  Service: Podiatry;  Laterality: Left;   • KNEE INCISION AND DRAINAGE Right 09/21/2022    Procedure: KNEE INCISION AND DRAINAGE RIGHT;  Surgeon: Brain Bentley MD;  Location: [facility] OR;  Service: Orthopedics;   Laterality: Right;   • PERIPHERALLY INSERTED CENTRAL CATHETER INSERTION Left    • TOTAL KNEE  PROSTHESIS REMOVAL W/ SPACER INSERTION Right 11/30/2022    Procedure: ANTIBIOTIC SPACER PLACEMENT KNEE - RIGHT;  Surgeon: Brain Bentley MD;  Location:  [facility] OR;  Service: Orthopedics;  Laterality: Right;   • WOUND DEBRIDEMENT Left 05/13/2022    Procedure: DEBRIDEMENT FOOT;  Surgeon: Addison Colby MD;  Location:  [facility] OR;  Service: Podiatry;  Laterality: Left;       Social History     Socioeconomic History   • Marital status: Single   Tobacco Use   • Smoking status: Never   • Smokeless tobacco: Never   Vaping Use   • Vaping Use: Never used   Substance and Sexual Activity   • Alcohol use: Never   • Drug use: Never   • Sexual activity: Defer       Family History  family history is not on file.      There is no immunization history on file for this patient.     Allergies  No Known Allergies    The medication list has been reviewed and updated.   Current Medications    Current Outpatient Medications:   •  ascorbic acid (VITAMIN C) 500 MG tablet, Take 1 tablet by mouth Daily., Disp: , Rfl:   •  aspirin 81 MG EC tablet, Take 1 tablet by mouth Every 12 (Twelve) Hours for 35 days. Then resume daily  Indications: VTE Prophylaxis, Disp: , Rfl:   •  Diclofenac Sodium (VOLTAREN) 1 % gel gel, Apply 4 g topically to the appropriate area as directed 4 (Four) Times a Day As Needed., Disp: , Rfl:   •  doxycycline (VIBRAMYICN) 100 MG tablet, Take 100 mg by mouth 2 (Two) Times a Day., Disp: , Rfl:   •  fludrocortisone 0.1 MG tablet, Take 0.5 tablets by mouth Daily., Disp: , Rfl:   •  Insulin Aspart (novoLOG) 100 UNIT/ML injection, Inject 0-14 Units under the skin into the appropriate area as directed 3 (Three) Times a Day Before Meals., Disp: , Rfl: 12  •  Insulin Aspart (novoLOG) 100 UNIT/ML injection, Inject 4 Units under the skin into the appropriate area as directed 3 (Three) Times a Day Before Meals., Disp: , Rfl: 12  •  insulin  detemir (LEVEMIR) 100 UNIT/ML injection, Inject 17 Units under the skin into the appropriate area as directed Every 12 (Twelve) Hours., Disp: , Rfl: 12  •  levothyroxine (SYNTHROID, LEVOTHROID) 50 MCG tablet, Take 50 mcg by mouth Daily., Disp: , Rfl:   •  multivitamin with minerals tablet tablet, Take 1 tablet by mouth Daily., Disp: , Rfl:   •  pantoprazole (PROTONIX) 40 MG EC tablet, Take 1 tablet by mouth Every Morning., Disp: , Rfl:   •  pregabalin (LYRICA) 100 MG capsule, Take 1 capsule by mouth Every 12 (Twelve) Hours., Disp: , Rfl:   •  Vitamins A & D (magic barrier cream), Apply 1 application topically to the appropriate area as directed As Needed (skin irritation)., Disp: , Rfl:   •  acetaminophen (TYLENOL) 325 MG tablet, Take 2 tablets by mouth Every 4 (Four) Hours As Needed for Mild Pain., Disp: , Rfl:   •  bisacodyl (DULCOLAX) 10 MG suppository, Insert 1 suppository into the rectum Daily As Needed for Constipation., Disp: , Rfl:   •  cyclobenzaprine (FLEXERIL) 5 MG tablet, Take 1 tablet by mouth 3 (Three) Times a Day As Needed for Muscle Spasms., Disp: , Rfl:   •  docusate sodium 100 MG capsule, Take 1 capsule by mouth 2 (Two) Times a Day., Disp: , Rfl:   •  meloxicam (MOBIC) 15 MG tablet, Take 1 tablet by mouth Daily., Disp: , Rfl:   •  midodrine (PROAMATINE) 2.5 MG tablet, Take 1 tablet by mouth 3 (Three) Times a Day Before Meals. Hold if SBP is above 120, Disp: , Rfl:   •  polyethylene glycol (MIRALAX) 17 g packet, Take 17 g by mouth Daily., Disp: , Rfl:       Review of Systems    Review of Systems   Constitutional: Negative for activity change, appetite change, chills, diaphoresis and fever.   HENT: Negative for congestion, dental problem, drooling, mouth sores, sinus pressure and sneezing.    Eyes: Negative for blurred vision, double vision, photophobia and discharge.   Respiratory: Negative for apnea, cough, choking, chest tightness, shortness of breath and wheezing.    Cardiovascular: Negative for  "chest pain, palpitations and leg swelling.   Gastrointestinal: Negative for abdominal distention, abdominal pain, diarrhea, nausea and vomiting.   Genitourinary: Negative for dysuria, flank pain and frequency.   Musculoskeletal: Positive for arthralgias. Negative for neck pain and neck stiffness.        Left knee pain much improved and almost resolved.   Skin: Negative for rash.   Neurological: Negative for dizziness, tremors, seizures, syncope, speech difficulty, numbness, headache and confusion.   Psychiatric/Behavioral: Negative for agitation, behavioral problems, hallucinations and suicidal ideas.        Objective     Vital Signs:  /69 (BP Location: Right arm, Patient Position: Sitting, Cuff Size: Adult)   Pulse 94   Temp 98 °F (36.7 °C) (Temporal)   Ht 177.8 cm (70\")   Wt 98 kg (216 lb)   SpO2 99%   BMI 30.99 kg/m²   Estimated body mass index is 30.99 kg/m² as calculated from the following:    Height as of this encounter: 177.8 cm (70\").    Weight as of this encounter: 98 kg (216 lb).    Physical Exam  Constitutional:       General: He is not in acute distress.     Appearance: Normal appearance. He is normal weight. He is not ill-appearing, toxic-appearing or diaphoretic.   HENT:      Head: Normocephalic and atraumatic.      Nose: Nose normal. No congestion or rhinorrhea.      Mouth/Throat:      Mouth: Mucous membranes are moist.      Pharynx: Oropharynx is clear.   Eyes:      General: No scleral icterus.     Extraocular Movements: Extraocular movements intact.      Pupils: Pupils are equal, round, and reactive to light.   Neck:      Vascular: No carotid bruit.   Cardiovascular:      Rate and Rhythm: Normal rate and regular rhythm.      Pulses: Normal pulses.      Heart sounds: No murmur heard.  Pulmonary:      Effort: Pulmonary effort is normal. No respiratory distress.      Breath sounds: Normal breath sounds. No stridor. No wheezing, rhonchi or rales.   Chest:      Chest wall: No tenderness. "   Abdominal:      General: Abdomen is flat. Bowel sounds are normal. There is no distension.      Palpations: Abdomen is soft.      Tenderness: There is no abdominal tenderness. There is no guarding or rebound.   Musculoskeletal:      Cervical back: Normal range of motion and neck supple. No rigidity or tenderness.      Comments: Right knee with edema but no erythema and no drainage, no warmth.   Lymphadenopathy:      Cervical: No cervical adenopathy.   Neurological:      General: No focal deficit present.      Mental Status: He is alert and oriented to person, place, and time.   Psychiatric:         Mood and Affect: Mood normal.         Behavior: Behavior normal.          Result Review :  The following data was reviewed by Marilynn Giordano MD     Lab Results  Lab Results   Component Value Date    WBC 2.80 (L) 12/13/2022    HGB 7.5 (L) 12/13/2022    HCT 24.4 (L) 12/13/2022    MCV 82.4 12/13/2022    PLT 82 (L) 12/13/2022     Lab Results   Component Value Date    GLUCOSE 169 (H) 12/13/2022    BUN 15 12/13/2022    CREATININE 0.72 (L) 12/13/2022    BCR 20.8 12/13/2022    K 4.1 12/13/2022    CO2 25.0 12/13/2022    CALCIUM 7.6 (L) 12/13/2022    ALBUMIN 2.20 (L) 12/13/2022    AST 48 (H) 12/13/2022    ALT 29 12/13/2022      Lab Results   Component Value Date    CRP 3.52 (H) 11/28/2022        No results found for: ACANTHNAEG, AFBCX, BPERTUSSISCX, BLOODCX  No results found for: BCIDPCR, CXREFLEX, CSFCX, CULTURETIS  No results found for: CULTURES, HSVCX, URCX  No results found for: EYECULTURE, GCCX, HSVCULTURE, LABHSV  No results found for: LEGIONELLA, MRSACX, MUMPSCX, MYCOPLASCX  No results found for: NOCARDIACX, STOOLCX  No results found for: THROATCX, UNSTIMCULT, URINECX, CULTURE, VZVCULTUR  No results found for: VIRALCULTU, WOUNDCX    Radiology Results              Assessment / Plan        Diagnoses and all orders for this visit:    1. MRSA bacteremia (Primary)  -     CBC & Differential; Future  -     C-reactive  Protein; Future  -     Comprehensive Metabolic Panel; Future    2. Staphylococcal arthritis of right knee (HCC)  -     CBC & Differential; Future  -     C-reactive Protein; Future  -     Comprehensive Metabolic Panel; Future    3. Endocarditis of tricuspid valve  -     CBC & Differential; Future  -     C-reactive Protein; Future  -     Comprehensive Metabolic Panel; Future    patient has antibiotic spacer in right knee and would recommend to continue with Doxycycline 100 mg po bid for suppressive therapy likely lifelong and go ahead and get labs today with CBC, CMP and CRP.  Follow up in one week and will need to follow labs very closely especially in the setting of recent RK and leukopenia.          Follow Up   Return in about 1 week (around 1/24/2023) for Follow up, With Dr. Giordano.    Visit Diagnoses:    ICD-10-CM ICD-9-CM   1. MRSA bacteremia  R78.81 790.7    B95.62 041.12   2. Staphylococcal arthritis of right knee (HCC)  M00.061 711.06     041.10   3. Endocarditis of tricuspid valve  I07.9 424.2       Patient was given instructions and counseling regarding his condition or for health maintenance advice. Please see specific information pulled into the AVS if appropriate.     This document has been electronically signed by Marilynn Giordano MD   January 17, 2023 11:24 EST    Dictated Utilizing Dragon Dictation: Part of this note may be an electronic transcription/translation of spoken language to printed text using the Dragon Dictation System.

## 2023-01-18 ENCOUNTER — TREATMENT (OUTPATIENT)
Dept: PHYSICAL THERAPY | Facility: CLINIC | Age: 58
End: 2023-01-18
Payer: MEDICAID

## 2023-01-18 DIAGNOSIS — R26.89 ANTALGIC GAIT: ICD-10-CM

## 2023-01-18 DIAGNOSIS — M00.061 STAPHYLOCOCCAL ARTHRITIS OF RIGHT KNEE: Primary | ICD-10-CM

## 2023-01-18 DIAGNOSIS — S98.912D AMPUTATION OF LEFT FOOT, SUBSEQUENT ENCOUNTER: ICD-10-CM

## 2023-01-18 PROCEDURE — 97110 THERAPEUTIC EXERCISES: CPT | Performed by: PHYSICAL THERAPIST

## 2023-01-18 NOTE — PROGRESS NOTES
Physical Therapy Daily Treatment Note      Patient: Melchor Hardwick III   : 1965  Referring practitioner: Liyah Hill DO  Date of Initial Visit: Type: THERAPY  Noted: 2023  Today's Date: 2023  Patient seen for 2 sessions       Visit Diagnoses:    ICD-10-CM ICD-9-CM   1. Staphylococcal arthritis of right knee ([facility])  M00.061 711.06     041.10   2. Amputation of left foot, subsequent encounter ([facility])  S98.912D V54.89     896.0   3. Antalgic gait  R26.89 781.2       Subjective Evaluation    History of Present Illness    Subjective comment: Patient reports 4/10 pain in the right knee; he notes no pain in the foot.  Patient states that his back has been bothering him a lot lately.Pain  Current pain ratin           Objective   See Exercise, Manual, and Modality Logs for complete treatment.       Assessment & Plan     Assessment    Assessment details: Therapy session consisted of there ex, per flow sheet.  Exercises focused on LE strengthening and ROM.  Patient instructed to perform exercises per his tolerance and POC, with patient verbalizing understanding.  Rest breaks were provided as necessary during treatment session.  Patient reported mild discomfort with heel slides, but noted that it eased at rest.  Patient declined ice at conclusion of session.  Patient noted no increase in pain at conclusion of session.  He will continue to be progressed per his tolerance and POC.          Timed:         Manual Therapy:         mins  00383;     Therapeutic Exercise:    31     mins  65813;     Neuromuscular Mojgan:        mins  44101;    Therapeutic Activity:          mins  75424;     Gait Training:           mins  00925;     Ultrasound:          mins  41445;    Ionto                                   mins   15602  Self Care                            mins   72550  Canalith Repos         mins 31895      Un-Timed:  Electrical Stimulation:         mins  47336 ( );  Dry Needling          mins  self-pay  Traction          mins 70510      Timed Treatment:   31   mins   Total Treatment:     31   mins    Sara Pineda, PT  [place] License: [id number]  Electronically signed by Sara Pineda PT, 01/18/23, 10:30 AM EST.

## 2023-01-23 ENCOUNTER — TREATMENT (OUTPATIENT)
Dept: PHYSICAL THERAPY | Facility: CLINIC | Age: 58
End: 2023-01-23
Payer: MEDICAID

## 2023-01-23 DIAGNOSIS — M00.061 STAPHYLOCOCCAL ARTHRITIS OF RIGHT KNEE: Primary | ICD-10-CM

## 2023-01-23 DIAGNOSIS — R26.89 ANTALGIC GAIT: ICD-10-CM

## 2023-01-23 DIAGNOSIS — S98.912D AMPUTATION OF LEFT FOOT, SUBSEQUENT ENCOUNTER: ICD-10-CM

## 2023-01-23 PROCEDURE — 97530 THERAPEUTIC ACTIVITIES: CPT | Performed by: PHYSICAL THERAPIST

## 2023-01-23 PROCEDURE — 97110 THERAPEUTIC EXERCISES: CPT | Performed by: PHYSICAL THERAPIST

## 2023-01-23 NOTE — PROGRESS NOTES
"Physical Therapy Daily Treatment Note      Patient: Melchor Hardwick III   : 1965  Referring practitioner: Liyah Hill DO  Date of Initial Visit: Type: THERAPY  Noted: 2023  Today's Date: 2023  Patient seen for 3 sessions       Visit Diagnoses:    ICD-10-CM ICD-9-CM   1. Staphylococcal arthritis of right knee ([facility])  M00.061 711.06     041.10   2. Amputation of left foot, subsequent encounter ([facility])  S98.912D V54.89     896.0   3. Antalgic gait  R26.89 781.2       Subjective: Patient states his knee is \"stiff\" this morning, and his foot is sore from walking on it more; otherwise he notes of no new complaints. Pt reports he tolerated last session well with good tolerance.     Objective   See Exercise, Manual, and Modality Logs for complete treatment.       Assessment/Plan: Patient demonstrated good effort during today's session and had no complaints during, and/ of following tx. Pt performed therex and therapeutic activities as listed to assist with improved bilateral LE ROM, improved bilateral LE strength, and to ease difficulty with ADL's; cryotherapy applied at conclusion. Exercise progressed with additional strengthening activities added to program including LAQ's hip abduction with sliding board and knee flexion with theraband. Pt observed with good tolerance, and was provided with cues and demonstration as needed for form, and for max benefit. Pt continues to benefit from therapy services, and will be progressed as tolerated. No signs of distress or adverse reactions observed during, and/ or following tx. Continue with PT's POC.       Timed:         Manual Therapy:         mins  63807;     Therapeutic Exercise:   34      mins  67115;     Neuromuscular Mojgan:        mins  26260;    Therapeutic Activity:    10      mins  86119;     Gait Training:           mins  71477;     Ultrasound:          mins  60669;    Ionto                                   mins   28650  Self Care                      "       mins   94012  Canalith Repos         mins 18675      Un-Timed:  Electrical Stimulation:         mins  95830 ( );  Dry Needling          mins self-pay  Traction          mins 98416      Timed Treatment:   44   mins   Total Treatment:    50    mins (CP: x 6 min)    Ary Malin. LIZETT Cardoso  [place] License: [id number]

## 2023-01-25 ENCOUNTER — TREATMENT (OUTPATIENT)
Dept: PHYSICAL THERAPY | Facility: CLINIC | Age: 58
End: 2023-01-25
Payer: MEDICAID

## 2023-01-25 DIAGNOSIS — M00.061 STAPHYLOCOCCAL ARTHRITIS OF RIGHT KNEE: Primary | ICD-10-CM

## 2023-01-25 DIAGNOSIS — R26.89 ANTALGIC GAIT: ICD-10-CM

## 2023-01-25 DIAGNOSIS — S98.912D AMPUTATION OF LEFT FOOT, SUBSEQUENT ENCOUNTER: ICD-10-CM

## 2023-01-25 PROCEDURE — 97110 THERAPEUTIC EXERCISES: CPT | Performed by: PHYSICAL THERAPIST

## 2023-01-25 PROCEDURE — 97530 THERAPEUTIC ACTIVITIES: CPT | Performed by: PHYSICAL THERAPIST

## 2023-01-25 NOTE — PROGRESS NOTES
Physical Therapy Daily Treatment Note      Patient: Melchor Hardwick III   : 1965  Referring practitioner: Liyah Hill DO  Date of Initial Visit: Type: THERAPY  Noted: 2023  Today's Date: 2023  Patient seen for 4 sessions       Visit Diagnoses:    ICD-10-CM ICD-9-CM   1. Staphylococcal arthritis of right knee ([facility])  M00.061 711.06     041.10   2. Amputation of left foot, subsequent encounter ([facility])  S98.912D V54.89     896.0   3. Antalgic gait  R26.89 781.2       Subjective Evaluation    History of Present Illness    Subjective comment: Pt reports having 2/10 pain today.       Objective   See Exercise, Manual, and Modality Logs for complete treatment.       Assessment & Plan     Assessment    Assessment details: Tx today consisted of exercises in supine and sitting progressed with increased resistance and reps for most exercises and ended with ice for decreased pain.  Pt responded well to treatment today with cues to reduce velocity with exercises.  Pt reported 2/10 post pain.    Plan  Plan details: Will follow progressing leg stability and decreased pain.  May progress to standing activities next session.          Timed:         Manual Therapy:         mins  86369;     Therapeutic Exercise:    31     mins  36258;     Neuromuscular Mojgan:        mins  20533;    Therapeutic Activity:     11     mins  67914;     Gait Training:           mins  46762;     Ultrasound:          mins  33394;    Ionto                                   mins   54028  Self Care                            mins   12063  Canalith Repos         mins 71338      Un-Timed:  Electrical Stimulation:         mins  51294 (MC );  Dry Needling          mins self-pay  Traction          mins 89754      Timed Treatment:   42   mins   Total Treatment:     48   Mins(6 min ice)    Nir Song PT  [place] License: [id number]      Electronically signed by Nir Song PT, 23, 8:41 AM EST

## 2023-01-26 ENCOUNTER — OFFICE VISIT (OUTPATIENT)
Dept: INFECTIOUS DISEASES | Facility: CLINIC | Age: 58
End: 2023-01-26
Payer: MEDICAID

## 2023-01-26 VITALS
HEART RATE: 103 BPM | OXYGEN SATURATION: 99 % | BODY MASS INDEX: 31.01 KG/M2 | WEIGHT: 216.6 LBS | SYSTOLIC BLOOD PRESSURE: 128 MMHG | DIASTOLIC BLOOD PRESSURE: 64 MMHG | TEMPERATURE: 97.3 F | HEIGHT: 70 IN

## 2023-01-26 DIAGNOSIS — M00.061 STAPHYLOCOCCAL ARTHRITIS OF RIGHT KNEE: Primary | ICD-10-CM

## 2023-01-26 PROCEDURE — 99214 OFFICE O/P EST MOD 30 MIN: CPT | Performed by: INTERNAL MEDICINE

## 2023-01-26 NOTE — PROGRESS NOTES
[facility]         Referring Provider: No referring provider defined for this encounter.    Subjective      Chief Complaint  Wound Infection (Right knee)    History of Present Illness  Melchor Hardwick III is a 57 y.o. male who presents today to [facility] for Follow Up . Past medical history is significant for Diabetes mellitus, hypertension, complicated MRSA infection, right knee septic arthritis with underlying osteomyelitis status post multiple washout who presented for follow-up after stopping his vancomycin and ceftaroline and is currently on oral doxycycline suppressive therapy.        Past Medical History:   Diagnosis Date   • Diabetes mellitus (HCC)     4 times per day gets checked for sugar at rehab   • Dizzy    • Hyperlipidemia    • Hypertension    • Hypothyroid 11/30/2022   • MRSA infection     blood -   2022   • Orthostatic hypotension    • Pressure sore on buttocks     top crack -  sees wound - but now rn take care of it at rehab - minor opening - dime size - pat nurse didnt assess-  pt states getting better   • Pyogenic arthritis of right knee joint, due to unspecified organism (HCC) 09/21/2022   • Sepsis (HCC)    • Wears glasses     readers       Past Surgical History:   Procedure Laterality Date   • ABSCESS DRAINAGE  2004    PERINEUM   • AMPUTATION FOOT Left 05/18/2022    Procedure: AMPUTATION FOOT;  Surgeon: Addison Colby MD;  Location: [facility] OR;  Service: Podiatry;  Laterality: Left;   • INCISION AND DRAINAGE LEG Left 05/10/2022    Procedure: INCISION AND DRAINAGE LOWER EXTREMITY;  Surgeon: Addison Colby MD;  Location: [facility] OR;  Service: Podiatry;  Laterality: Left;   • KNEE INCISION AND DRAINAGE Right 09/21/2022    Procedure: KNEE INCISION AND DRAINAGE RIGHT;  Surgeon: Brain Bentley MD;  Location: [facility] OR;  Service: Orthopedics;  Laterality: Right;   • PERIPHERALLY INSERTED CENTRAL CATHETER INSERTION Left    • TOTAL KNEE   PROSTHESIS REMOVAL W/ SPACER INSERTION Right 11/30/2022    Procedure: ANTIBIOTIC SPACER PLACEMENT KNEE - RIGHT;  Surgeon: Brain Bentley MD;  Location:  [facility] OR;  Service: Orthopedics;  Laterality: Right;   • WOUND DEBRIDEMENT Left 05/13/2022    Procedure: DEBRIDEMENT FOOT;  Surgeon: Addison Colby MD;  Location:  [facility] OR;  Service: Podiatry;  Laterality: Left;       Social History     Socioeconomic History   • Marital status: Single   Tobacco Use   • Smoking status: Never   • Smokeless tobacco: Never   Vaping Use   • Vaping Use: Never used   Substance and Sexual Activity   • Alcohol use: Never   • Drug use: Never   • Sexual activity: Defer       Family History  family history is not on file.      There is no immunization history on file for this patient.     Allergies  No Known Allergies    The medication list has been reviewed and updated.   Current Medications    Current Outpatient Medications:   •  acetaminophen (TYLENOL) 325 MG tablet, Take 2 tablets by mouth Every 4 (Four) Hours As Needed for Mild Pain., Disp: , Rfl:   •  ascorbic acid (VITAMIN C) 500 MG tablet, Take 1 tablet by mouth Daily., Disp: , Rfl:   •  bisacodyl (DULCOLAX) 10 MG suppository, Insert 1 suppository into the rectum Daily As Needed for Constipation., Disp: , Rfl:   •  cyclobenzaprine (FLEXERIL) 5 MG tablet, Take 1 tablet by mouth 3 (Three) Times a Day As Needed for Muscle Spasms., Disp: , Rfl:   •  Diclofenac Sodium (VOLTAREN) 1 % gel gel, Apply 4 g topically to the appropriate area as directed 4 (Four) Times a Day As Needed., Disp: , Rfl:   •  docusate sodium 100 MG capsule, Take 1 capsule by mouth 2 (Two) Times a Day., Disp: , Rfl:   •  doxycycline (VIBRAMYICN) 100 MG tablet, Take 100 mg by mouth 2 (Two) Times a Day., Disp: , Rfl:   •  fludrocortisone 0.1 MG tablet, Take 0.5 tablets by mouth Daily., Disp: , Rfl:   •  Insulin Aspart (novoLOG) 100 UNIT/ML injection, Inject 0-14 Units under the skin into the appropriate area as  directed 3 (Three) Times a Day Before Meals., Disp: , Rfl: 12  •  Insulin Aspart (novoLOG) 100 UNIT/ML injection, Inject 4 Units under the skin into the appropriate area as directed 3 (Three) Times a Day Before Meals., Disp: , Rfl: 12  •  insulin detemir (LEVEMIR) 100 UNIT/ML injection, Inject 17 Units under the skin into the appropriate area as directed Every 12 (Twelve) Hours., Disp: , Rfl: 12  •  levothyroxine (SYNTHROID, LEVOTHROID) 50 MCG tablet, Take 50 mcg by mouth Daily., Disp: , Rfl:   •  meloxicam (MOBIC) 15 MG tablet, Take 1 tablet by mouth Daily., Disp: , Rfl:   •  midodrine (PROAMATINE) 2.5 MG tablet, Take 1 tablet by mouth 3 (Three) Times a Day Before Meals. Hold if SBP is above 120, Disp: , Rfl:   •  multivitamin with minerals tablet tablet, Take 1 tablet by mouth Daily., Disp: , Rfl:   •  pantoprazole (PROTONIX) 40 MG EC tablet, Take 1 tablet by mouth Every Morning., Disp: , Rfl:   •  polyethylene glycol (MIRALAX) 17 g packet, Take 17 g by mouth Daily., Disp: , Rfl:   •  pregabalin (LYRICA) 100 MG capsule, Take 1 capsule by mouth Every 12 (Twelve) Hours., Disp: , Rfl:   •  Vitamins A & D (magic barrier cream), Apply 1 application topically to the appropriate area as directed As Needed (skin irritation)., Disp: , Rfl:       Review of Systems    Review of Systems   Constitutional: Negative for activity change, appetite change, chills, diaphoresis and fever.   HENT: Negative for congestion, dental problem, drooling, mouth sores, sinus pressure and sneezing.    Eyes: Negative for blurred vision, double vision, photophobia and discharge.   Respiratory: Negative for apnea, cough, choking, chest tightness, shortness of breath and wheezing.    Cardiovascular: Negative for chest pain, palpitations and leg swelling.   Gastrointestinal: Negative for abdominal distention, abdominal pain, diarrhea, nausea and vomiting.   Genitourinary: Negative for dysuria, flank pain and frequency.   Musculoskeletal: Positive  "for arthralgias and gait problem. Negative for neck pain and neck stiffness.   Skin: Negative for rash.   Neurological: Negative for dizziness, tremors, seizures, syncope, speech difficulty, numbness, headache and confusion.   Psychiatric/Behavioral: Negative for agitation, behavioral problems, hallucinations and suicidal ideas.        Objective     Vital Signs:  /64 (BP Location: Right arm, Patient Position: Sitting, Cuff Size: Adult)   Pulse 103   Temp 97.3 °F (36.3 °C) (Temporal)   Ht 177.8 cm (70\")   Wt 98.2 kg (216 lb 9.6 oz)   SpO2 99%   BMI 31.08 kg/m²   Estimated body mass index is 31.08 kg/m² as calculated from the following:    Height as of this encounter: 177.8 cm (70\").    Weight as of this encounter: 98.2 kg (216 lb 9.6 oz).    Physical Exam  Constitutional:       General: He is not in acute distress.     Appearance: Normal appearance. He is normal weight. He is not ill-appearing, toxic-appearing or diaphoretic.   HENT:      Head: Normocephalic and atraumatic.      Nose: Nose normal. No congestion or rhinorrhea.      Mouth/Throat:      Mouth: Mucous membranes are moist.      Pharynx: Oropharynx is clear.   Eyes:      General: No scleral icterus.     Extraocular Movements: Extraocular movements intact.      Pupils: Pupils are equal, round, and reactive to light.   Neck:      Vascular: No carotid bruit.   Cardiovascular:      Rate and Rhythm: Normal rate and regular rhythm.      Pulses: Normal pulses.      Heart sounds: No murmur heard.  Pulmonary:      Effort: Pulmonary effort is normal. No respiratory distress.      Breath sounds: Normal breath sounds. No stridor. No wheezing, rhonchi or rales.   Chest:      Chest wall: No tenderness.   Abdominal:      General: Abdomen is flat. Bowel sounds are normal. There is no distension.      Palpations: Abdomen is soft.      Tenderness: There is no abdominal tenderness. There is no guarding or rebound.   Musculoskeletal:      Cervical back: Normal " range of motion and neck supple. No rigidity or tenderness.      Comments: Right knee surgical wound looks great with no erythema no drainage and significant improvement of overall swelling.  No warmth to touch.   Lymphadenopathy:      Cervical: No cervical adenopathy.   Skin:     Coloration: Skin is not jaundiced.      Findings: No lesion or rash.   Neurological:      General: No focal deficit present.      Mental Status: He is alert and oriented to person, place, and time.   Psychiatric:         Mood and Affect: Mood normal.         Behavior: Behavior normal.          Result Review :  The following data was reviewed by Marilynn Giordano MD     Lab Results  Lab Results   Component Value Date    WBC 2.62 (L) 01/17/2023    HGB 10.2 (L) 01/17/2023    HCT 33.7 (L) 01/17/2023    MCV 80.6 01/17/2023    PLT 83 (L) 01/17/2023     Lab Results   Component Value Date    GLUCOSE 198 (H) 01/17/2023    BUN 14 01/17/2023    CREATININE 0.76 01/17/2023    BCR 18.4 01/17/2023    K 3.9 01/17/2023    CO2 27.4 01/17/2023    CALCIUM 8.7 01/17/2023    ALBUMIN 3.6 01/17/2023    AST 39 01/17/2023    ALT 24 01/17/2023      Lab Results   Component Value Date    CRP 1.80 (H) 01/17/2023        No results found for: ACANTHNAEG, AFBCX, BPERTUSSISCX, BLOODCX  No results found for: BCIDPCR, CXREFLEX, CSFCX, CULTURETIS  No results found for: CULTURES, HSVCX, URCX  No results found for: EYECULTURE, GCCX, HSVCULTURE, LABHSV  No results found for: LEGIONELLA, MRSACX, MUMPSCX, MYCOPLASCX  No results found for: NOCARDIACX, STOOLCX  No results found for: THROATCX, UNSTIMCULT, URINECX, CULTURE, VZVCULTUR  No results found for: VIRALCULTU, WOUNDCX    Radiology Results              Assessment / Plan        Diagnoses and all orders for this visit:    1. Staphylococcal arthritis of right knee (HCC) (Primary)  -     CBC & Differential; Future  -     C-reactive Protein; Future  -     Comprehensive Metabolic Panel; Future  -     CBC & Differential  -      C-reactive Protein  -     Comprehensive Metabolic Panel    I reviewed his blood work result and he is RK has resolved and his last creatinine was at 0.76.    CRP has shown significant improvement since prior results and it is down to 1.80.    Unfortunately patient remains with leukopenia and thrombocytopenia most likely related to previous antibiotic therapy with vancomycin and ceftaroline with a WBC at 2.62 and a platelet count at 83 and will recheck in 1 week to document improvement or lack of.    For now patient to continue with doxycycline suppressive therapy most likely for lifetime and recheck on labs.            Follow Up   Return in about 2 weeks (around 2/9/2023) for Follow up, With Dr. Giordano.    Visit Diagnoses:    ICD-10-CM ICD-9-CM   1. Staphylococcal arthritis of right knee (HCC)  M00.061 711.06     041.10       Patient was given instructions and counseling regarding his condition or for health maintenance advice. Please see specific information pulled into the AVS if appropriate.     This document has been electronically signed by Marilynn Giordano MD   January 26, 2023 10:41 EST    Dictated Utilizing Dragon Dictation: Part of this note may be an electronic transcription/translation of spoken language to printed text using the Dragon Dictation System.

## 2023-01-30 ENCOUNTER — TREATMENT (OUTPATIENT)
Dept: PHYSICAL THERAPY | Facility: CLINIC | Age: 58
End: 2023-01-30
Payer: MEDICAID

## 2023-01-30 DIAGNOSIS — M00.061 STAPHYLOCOCCAL ARTHRITIS OF RIGHT KNEE: Primary | ICD-10-CM

## 2023-01-30 DIAGNOSIS — S98.912D AMPUTATION OF LEFT FOOT, SUBSEQUENT ENCOUNTER: ICD-10-CM

## 2023-01-30 DIAGNOSIS — R26.89 ANTALGIC GAIT: ICD-10-CM

## 2023-01-30 PROCEDURE — 97112 NEUROMUSCULAR REEDUCATION: CPT | Performed by: PHYSICAL THERAPIST

## 2023-01-30 PROCEDURE — 97110 THERAPEUTIC EXERCISES: CPT | Performed by: PHYSICAL THERAPIST

## 2023-01-30 NOTE — PROGRESS NOTES
Physical Therapy Daily Treatment Note      Patient: Melchor Hardwick III   : 1965  Referring practitioner: Liyah Hill DO  Date of Initial Visit: Type: THERAPY  Noted: 2023  Today's Date: 2023  Patient seen for 5 sessions       Visit Diagnoses:    ICD-10-CM ICD-9-CM   1. Staphylococcal arthritis of right knee ([facility])  M00.061 711.06     041.10   2. Amputation of left foot, subsequent encounter ([facility])  S98.912D V54.89     896.0   3. Antalgic gait  R26.89 781.2       Subjective Evaluation    History of Present Illness    Subjective comment: Pt has no complaints today.       Objective   See Exercise, Manual, and Modality Logs for complete treatment.       Assessment & Plan     Assessment    Assessment details: Tx today consisted of supine exercises for improved knee mobility and leg stability; sitting exercises; followed by standing unsupported balance and functional activities to reduce falls at home and increased leg stability.  Pt demonstrated good effort today with noted right LE weakness and apprehension due to fear of falling.  Pt noted to have difficulty with foam step ups and lateral side stepping.  Pt encouraged to cont mat exercises while PT transitions to increased standing exercises.  Pt reported no pain following session today.    Plan  Plan details: Will follow progressing leg stability and for improved balance.          Timed:         Manual Therapy:         mins  76866;     Therapeutic Exercise:    27     mins  53148;     Neuromuscular Mojgan:    14    mins  02468;    Therapeutic Activity:          mins  61051;     Gait Training:           mins  75409;     Ultrasound:          mins  99973;    Ionto                                   mins   53860  Self Care                            mins   18105  Canalith Repos         mins 78017      Un-Timed:  Electrical Stimulation:         mins  43762 ( );  Dry Needling          mins self-pay  Traction          mins 42564      Timed  Treatment:   41   mins   Total Treatment:     41   mins    Nir Song PT  [place] License: [id number]      Electronically signed by Nir Song PT, 01/30/23, 8:41 AM EST

## 2023-02-01 ENCOUNTER — TREATMENT (OUTPATIENT)
Dept: PHYSICAL THERAPY | Facility: CLINIC | Age: 58
End: 2023-02-01
Payer: MEDICAID

## 2023-02-01 DIAGNOSIS — S98.912D AMPUTATION OF LEFT FOOT, SUBSEQUENT ENCOUNTER: ICD-10-CM

## 2023-02-01 DIAGNOSIS — M00.061 STAPHYLOCOCCAL ARTHRITIS OF RIGHT KNEE: Primary | ICD-10-CM

## 2023-02-01 DIAGNOSIS — R26.89 ANTALGIC GAIT: ICD-10-CM

## 2023-02-01 PROCEDURE — 97110 THERAPEUTIC EXERCISES: CPT | Performed by: PHYSICAL THERAPIST

## 2023-02-01 PROCEDURE — 97530 THERAPEUTIC ACTIVITIES: CPT | Performed by: PHYSICAL THERAPIST

## 2023-02-01 NOTE — PROGRESS NOTES
Physical Therapy Daily Treatment Note      Patient: Melchor Hardwick III   : 1965  Referring practitioner: Liyah Hill DO  Date of Initial Visit: Type: THERAPY  Noted: 2023  Today's Date: 2023  Patient seen for 6 sessions       Visit Diagnoses:    ICD-10-CM ICD-9-CM   1. Staphylococcal arthritis of right knee ([facility])  M00.061 711.06     041.10   2. Amputation of left foot, subsequent encounter ([facility])  S98.912D V54.89     896.0   3. Antalgic gait  R26.89 781.2       Subjective Evaluation    History of Present Illness    Subjective comment: Pt strained his right knee this AM while opening a door.  Pt reports having 3/10 pain today.       Objective   See Exercise, Manual, and Modality Logs for complete treatment.       Assessment & Plan     Assessment    Assessment details: Tx today consisted of exercises in supine and sitting for improved hip and knee stability; followed functional step overs for improved function with ADLs and proprioceptive training to reduce falls at home. Pt demonstrated good effort with mild fatigue today.    Plan  Plan details: Will follow progressing hip and knee stability for improved mobility.          Timed:         Manual Therapy:         mins  02753;     Therapeutic Exercise:    31     mins  03851;     Neuromuscular Mojgan:        mins  16549;    Therapeutic Activity:     12     mins  50807;     Gait Training:           mins  24836;     Ultrasound:          mins  62405;    Ionto                                   mins   26232  Self Care                            mins   40848  Canalith Repos         mins 14454      Un-Timed:  Electrical Stimulation:         mins  76868 (MC );  Dry Needling          mins self-pay  Traction          mins 30332      Timed Treatment:   43   mins   Total Treatment:     49   Mins(6 min)    Nir Song PT  [place] License: [id number]      Electronically signed by Nir Song PT, 23, 8:48 AM EST

## 2023-02-02 ENCOUNTER — LAB (OUTPATIENT)
Dept: LAB | Facility: HOSPITAL | Age: 58
End: 2023-02-02
Payer: MEDICAID

## 2023-02-02 LAB
ALBUMIN SERPL-MCNC: 3.4 G/DL (ref 3.5–5.2)
ALBUMIN/GLOB SERPL: 1.2 G/DL
ALP SERPL-CCNC: 170 U/L (ref 39–117)
ALT SERPL W P-5'-P-CCNC: 28 U/L (ref 1–41)
ANION GAP SERPL CALCULATED.3IONS-SCNC: 7 MMOL/L (ref 5–15)
AST SERPL-CCNC: 50 U/L (ref 1–40)
BASOPHILS # BLD AUTO: 0 10*3/MM3 (ref 0–0.2)
BASOPHILS NFR BLD AUTO: 0 % (ref 0–1.5)
BILIRUB SERPL-MCNC: 0.6 MG/DL (ref 0–1.2)
BUN SERPL-MCNC: 10 MG/DL (ref 6–20)
BUN/CREAT SERPL: 11.6 (ref 7–25)
CALCIUM SPEC-SCNC: 8.8 MG/DL (ref 8.6–10.5)
CHLORIDE SERPL-SCNC: 105 MMOL/L (ref 98–107)
CO2 SERPL-SCNC: 27 MMOL/L (ref 22–29)
CREAT SERPL-MCNC: 0.86 MG/DL (ref 0.76–1.27)
CRP SERPL-MCNC: 0.79 MG/DL (ref 0–0.5)
DEPRECATED RDW RBC AUTO: 43.3 FL (ref 37–54)
EGFRCR SERPLBLD CKD-EPI 2021: 101 ML/MIN/1.73
EOSINOPHIL # BLD AUTO: 0.09 10*3/MM3 (ref 0–0.4)
EOSINOPHIL NFR BLD AUTO: 2.3 % (ref 0.3–6.2)
ERYTHROCYTE [DISTWIDTH] IN BLOOD BY AUTOMATED COUNT: 15 % (ref 12.3–15.4)
GLOBULIN UR ELPH-MCNC: 2.9 GM/DL
GLUCOSE SERPL-MCNC: 205 MG/DL (ref 65–99)
HCT VFR BLD AUTO: 36.4 % (ref 37.5–51)
HGB BLD-MCNC: 11.4 G/DL (ref 13–17.7)
LYMPHOCYTES # BLD AUTO: 0.82 10*3/MM3 (ref 0.7–3.1)
LYMPHOCYTES NFR BLD AUTO: 21.1 % (ref 19.6–45.3)
MCH RBC QN AUTO: 25.1 PG (ref 26.6–33)
MCHC RBC AUTO-ENTMCNC: 31.3 G/DL (ref 31.5–35.7)
MCV RBC AUTO: 80.2 FL (ref 79–97)
MONOCYTES # BLD AUTO: 0.25 10*3/MM3 (ref 0.1–0.9)
MONOCYTES NFR BLD AUTO: 6.4 % (ref 5–12)
NEUTROPHILS NFR BLD AUTO: 2.7 10*3/MM3 (ref 1.7–7)
NEUTROPHILS NFR BLD AUTO: 69.7 % (ref 42.7–76)
PLATELET # BLD AUTO: 82 10*3/MM3 (ref 140–450)
PMV BLD AUTO: 11 FL (ref 6–12)
POTASSIUM SERPL-SCNC: 4.1 MMOL/L (ref 3.5–5.2)
PROT SERPL-MCNC: 6.3 G/DL (ref 6–8.5)
RBC # BLD AUTO: 4.54 10*6/MM3 (ref 4.14–5.8)
SODIUM SERPL-SCNC: 139 MMOL/L (ref 136–145)
WBC NRBC COR # BLD: 3.88 10*3/MM3 (ref 3.4–10.8)

## 2023-02-02 PROCEDURE — 86140 C-REACTIVE PROTEIN: CPT | Performed by: INTERNAL MEDICINE

## 2023-02-02 PROCEDURE — 36415 COLL VENOUS BLD VENIPUNCTURE: CPT | Performed by: INTERNAL MEDICINE

## 2023-02-02 PROCEDURE — 85025 COMPLETE CBC W/AUTO DIFF WBC: CPT | Performed by: INTERNAL MEDICINE

## 2023-02-02 PROCEDURE — 80053 COMPREHEN METABOLIC PANEL: CPT | Performed by: INTERNAL MEDICINE

## 2023-02-07 ENCOUNTER — OFFICE VISIT (OUTPATIENT)
Dept: INFECTIOUS DISEASES | Facility: CLINIC | Age: 58
End: 2023-02-07
Payer: MEDICAID

## 2023-02-07 VITALS
HEART RATE: 95 BPM | HEIGHT: 70 IN | SYSTOLIC BLOOD PRESSURE: 155 MMHG | DIASTOLIC BLOOD PRESSURE: 80 MMHG | WEIGHT: 231.4 LBS | OXYGEN SATURATION: 98 % | BODY MASS INDEX: 33.13 KG/M2 | TEMPERATURE: 97.9 F

## 2023-02-07 DIAGNOSIS — B95.62 MRSA BACTEREMIA: ICD-10-CM

## 2023-02-07 DIAGNOSIS — I07.9 ENDOCARDITIS OF TRICUSPID VALVE: ICD-10-CM

## 2023-02-07 DIAGNOSIS — M00.061 STAPHYLOCOCCAL ARTHRITIS OF RIGHT KNEE: Primary | ICD-10-CM

## 2023-02-07 DIAGNOSIS — R78.81 MRSA BACTEREMIA: ICD-10-CM

## 2023-02-07 PROCEDURE — 99213 OFFICE O/P EST LOW 20 MIN: CPT | Performed by: INTERNAL MEDICINE

## 2023-02-07 NOTE — PROGRESS NOTES
[facility]         Referring Provider: Marilynn Giordano MD  [address] [place] [address]    Subjective      Chief Complaint  staphylococcal arthritis (Right knee)    History of Present Illness  Melchor Hardwick III is a 57 y.o. male who presents today to [facility] for Follow Up . Past medical history is significant for follow up on septic knee and bacteremia. Overall doing great with no complaints, no fever, no chills and no night sweats and is tolerating antibiotic therapy. Overall feeling much better.    Past Medical History:   Diagnosis Date   • Diabetes mellitus (HCC)     4 times per day gets checked for sugar at rehab   • Dizzy    • Hyperlipidemia    • Hypertension    • Hypothyroid 11/30/2022   • MRSA infection     blood -   2022   • Orthostatic hypotension    • Pressure sore on buttocks     top crack -  sees wound - but now rn take care of it at rehab - minor opening - dime size - pat nurse didnt assess-  pt states getting better   • Pyogenic arthritis of right knee joint, due to unspecified organism ([facility]) 09/21/2022   • Sepsis ([facility])    • Wears glasses     readers       Past Surgical History:   Procedure Laterality Date   • ABSCESS DRAINAGE  2004    PERINEUM   • AMPUTATION FOOT Left 05/18/2022    Procedure: AMPUTATION FOOT;  Surgeon: Addison Colby MD;  Location: [facility] OR;  Service: Podiatry;  Laterality: Left;   • INCISION AND DRAINAGE LEG Left 05/10/2022    Procedure: INCISION AND DRAINAGE LOWER EXTREMITY;  Surgeon: Addison Colby MD;  Location: [facility] OR;  Service: Podiatry;  Laterality: Left;   • KNEE INCISION AND DRAINAGE Right 09/21/2022    Procedure: KNEE INCISION AND DRAINAGE RIGHT;  Surgeon: Brain Bentley MD;  Location: [facility] OR;  Service: Orthopedics;  Laterality: Right;   • PERIPHERALLY INSERTED CENTRAL CATHETER INSERTION Left    • TOTAL KNEE  PROSTHESIS REMOVAL W/ SPACER INSERTION Right 11/30/2022    Procedure: ANTIBIOTIC  SPACER PLACEMENT KNEE - RIGHT;  Surgeon: Brain Bentley MD;  Location:  [facility] OR;  Service: Orthopedics;  Laterality: Right;   • WOUND DEBRIDEMENT Left 05/13/2022    Procedure: DEBRIDEMENT FOOT;  Surgeon: Addison Colby MD;  Location: [facility] OR;  Service: Podiatry;  Laterality: Left;       Social History     Socioeconomic History   • Marital status: Single   Tobacco Use   • Smoking status: Never   • Smokeless tobacco: Never   Vaping Use   • Vaping Use: Never used   Substance and Sexual Activity   • Alcohol use: Never   • Drug use: Never   • Sexual activity: Defer       Family History  family history is not on file.      There is no immunization history on file for this patient.     Allergies  No Known Allergies    The medication list has been reviewed and updated.   Current Medications    Current Outpatient Medications:   •  acetaminophen (TYLENOL) 325 MG tablet, Take 2 tablets by mouth Every 4 (Four) Hours As Needed for Mild Pain., Disp: , Rfl:   •  ascorbic acid (VITAMIN C) 500 MG tablet, Take 1 tablet by mouth Daily., Disp: , Rfl:   •  bisacodyl (DULCOLAX) 10 MG suppository, Insert 1 suppository into the rectum Daily As Needed for Constipation., Disp: , Rfl:   •  cyclobenzaprine (FLEXERIL) 5 MG tablet, Take 1 tablet by mouth 3 (Three) Times a Day As Needed for Muscle Spasms., Disp: , Rfl:   •  Diclofenac Sodium (VOLTAREN) 1 % gel gel, Apply 4 g topically to the appropriate area as directed 4 (Four) Times a Day As Needed., Disp: , Rfl:   •  docusate sodium 100 MG capsule, Take 1 capsule by mouth 2 (Two) Times a Day., Disp: , Rfl:   •  doxycycline (VIBRAMYICN) 100 MG tablet, Take 100 mg by mouth 2 (Two) Times a Day., Disp: , Rfl:   •  fludrocortisone 0.1 MG tablet, Take 0.5 tablets by mouth Daily., Disp: , Rfl:   •  Insulin Aspart (novoLOG) 100 UNIT/ML injection, Inject 0-14 Units under the skin into the appropriate area as directed 3 (Three) Times a Day Before Meals., Disp: , Rfl: 12  •  Insulin Aspart  (novoLOG) 100 UNIT/ML injection, Inject 4 Units under the skin into the appropriate area as directed 3 (Three) Times a Day Before Meals., Disp: , Rfl: 12  •  insulin detemir (LEVEMIR) 100 UNIT/ML injection, Inject 17 Units under the skin into the appropriate area as directed Every 12 (Twelve) Hours., Disp: , Rfl: 12  •  levothyroxine (SYNTHROID, LEVOTHROID) 50 MCG tablet, Take 50 mcg by mouth Daily., Disp: , Rfl:   •  meloxicam (MOBIC) 15 MG tablet, Take 1 tablet by mouth Daily., Disp: , Rfl:   •  midodrine (PROAMATINE) 2.5 MG tablet, Take 1 tablet by mouth 3 (Three) Times a Day Before Meals. Hold if SBP is above 120, Disp: , Rfl:   •  multivitamin with minerals tablet tablet, Take 1 tablet by mouth Daily., Disp: , Rfl:   •  pantoprazole (PROTONIX) 40 MG EC tablet, Take 1 tablet by mouth Every Morning., Disp: , Rfl:   •  polyethylene glycol (MIRALAX) 17 g packet, Take 17 g by mouth Daily., Disp: , Rfl:   •  pregabalin (LYRICA) 100 MG capsule, Take 1 capsule by mouth Every 12 (Twelve) Hours., Disp: , Rfl:   •  Vitamins A & D (magic barrier cream), Apply 1 application topically to the appropriate area as directed As Needed (skin irritation)., Disp: , Rfl:       Review of Systems    Review of Systems   Constitutional: Negative for activity change, appetite change, chills, diaphoresis and fever.   HENT: Negative for congestion, dental problem, drooling, mouth sores, sinus pressure and sneezing.    Eyes: Negative for blurred vision, double vision, photophobia and discharge.   Respiratory: Negative for apnea, cough, choking, chest tightness, shortness of breath and wheezing.    Cardiovascular: Negative for chest pain, palpitations and leg swelling.   Gastrointestinal: Negative for abdominal distention, abdominal pain, diarrhea, nausea and vomiting.   Genitourinary: Negative for dysuria, flank pain and frequency.   Musculoskeletal: Negative for arthralgias, gait problem, neck pain and neck stiffness.   Skin: Negative for  "rash.   Neurological: Negative for dizziness, tremors, seizures, syncope, speech difficulty, numbness, headache and confusion.   Psychiatric/Behavioral: Negative for agitation, behavioral problems, hallucinations and suicidal ideas.        Objective     Vital Signs:  /80 (BP Location: Right arm, Patient Position: Sitting, Cuff Size: Adult)   Pulse 95   Temp 97.9 °F (36.6 °C) (Temporal)   Ht 177.8 cm (70\")   Wt 105 kg (231 lb 6.4 oz)   SpO2 98%   BMI 33.20 kg/m²   Estimated body mass index is 33.2 kg/m² as calculated from the following:    Height as of this encounter: 177.8 cm (70\").    Weight as of this encounter: 105 kg (231 lb 6.4 oz).    Physical Exam  Constitutional:       General: He is not in acute distress.     Appearance: Normal appearance. He is normal weight. He is not ill-appearing, toxic-appearing or diaphoretic.   HENT:      Head: Normocephalic and atraumatic.      Nose: Nose normal. No congestion or rhinorrhea.      Mouth/Throat:      Mouth: Mucous membranes are moist.      Pharynx: Oropharynx is clear.   Eyes:      General: No scleral icterus.     Extraocular Movements: Extraocular movements intact.      Pupils: Pupils are equal, round, and reactive to light.   Neck:      Vascular: No carotid bruit.   Cardiovascular:      Rate and Rhythm: Normal rate and regular rhythm.      Pulses: Normal pulses.      Heart sounds: No murmur heard.  Pulmonary:      Effort: Pulmonary effort is normal. No respiratory distress.      Breath sounds: Normal breath sounds. No stridor. No wheezing, rhonchi or rales.   Chest:      Chest wall: No tenderness.   Abdominal:      General: Abdomen is flat. Bowel sounds are normal. There is no distension.      Palpations: Abdomen is soft.      Tenderness: There is no abdominal tenderness. There is no guarding or rebound.   Musculoskeletal:      Cervical back: Normal range of motion and neck supple. No rigidity or tenderness.      Right lower leg: Edema present.      Left " lower leg: Edema present.   Lymphadenopathy:      Cervical: No cervical adenopathy.   Skin:     Coloration: Skin is not jaundiced.      Findings: No lesion or rash.      Comments: Right knee with no erythema, no warmth and no drainage   Neurological:      General: No focal deficit present.      Mental Status: He is alert and oriented to person, place, and time.   Psychiatric:         Mood and Affect: Mood normal.         Behavior: Behavior normal.          Result Review :  The following data was reviewed by Marilynn Giordano MD     Lab Results  Lab Results   Component Value Date    WBC 3.88 02/02/2023    HGB 11.4 (L) 02/02/2023    HCT 36.4 (L) 02/02/2023    MCV 80.2 02/02/2023    PLT 82 (L) 02/02/2023     Lab Results   Component Value Date    GLUCOSE 205 (H) 02/02/2023    BUN 10 02/02/2023    CREATININE 0.86 02/02/2023    BCR 11.6 02/02/2023    K 4.1 02/02/2023    CO2 27.0 02/02/2023    CALCIUM 8.8 02/02/2023    ALBUMIN 3.4 (L) 02/02/2023    AST 50 (H) 02/02/2023    ALT 28 02/02/2023      Lab Results   Component Value Date    CRP 0.79 (H) 02/02/2023        No results found for: ACANTHNAEG, AFBCX, BPERTUSSISCX, BLOODCX  No results found for: BCIDPCR, CXREFLEX, CSFCX, CULTURETIS  No results found for: CULTURES, HSVCX, URCX  No results found for: EYECULTURE, GCCX, HSVCULTURE, LABHSV  No results found for: LEGIONELLA, MRSACX, MUMPSCX, MYCOPLASCX  No results found for: NOCARDIACX, STOOLCX  No results found for: THROATCX, UNSTIMCULT, URINECX, CULTURE, VZVCULTUR  No results found for: VIRALCULTU, WOUNDCX    Radiology Results              Assessment / Plan        Diagnoses and all orders for this visit:    1. Staphylococcal arthritis of right knee (HCC) (Primary)  -     CBC & Differential; Future  -     Comprehensive Metabolic Panel; Future  -     C-reactive Protein; Future    2. MRSA bacteremia  -     CBC & Differential; Future  -     Comprehensive Metabolic Panel; Future  -     C-reactive Protein; Future    3.  Endocarditis of tricuspid valve  -     CBC & Differential; Future  -     Comprehensive Metabolic Panel; Future  -     C-reactive Protein; Future    Patient is showing remarkable improvement overall and creatinine remains normal at 0.86, CRP continues to improve at 0.79, leukopenia has resolved at 3.88 but with persistent thrombocytopenia.    Overall very happy with clinical and laboratory progress.    Plan to recheck labs on 2/16/23 with CBC, CRP and CMP and follow up on 2/21/23.          Follow Up   Return in about 2 weeks (around 2/21/2023) for With Dr. Giordano, Follow up.    Visit Diagnoses:    ICD-10-CM ICD-9-CM   1. Staphylococcal arthritis of right knee (HCC)  M00.061 711.06     041.10   2. MRSA bacteremia  R78.81 790.7    B95.62 041.12   3. Endocarditis of tricuspid valve  I07.9 424.2       Patient was given instructions and counseling regarding his condition or for health maintenance advice. Please see specific information pulled into the AVS if appropriate.     This document has been electronically signed by Marilynn Giordano MD   February 7, 2023 09:48 EST    Dictated Utilizing Dragon Dictation: Part of this note may be an electronic transcription/translation of spoken language to printed text using the Dragon Dictation System.

## 2023-02-08 ENCOUNTER — TREATMENT (OUTPATIENT)
Dept: PHYSICAL THERAPY | Facility: CLINIC | Age: 58
End: 2023-02-08
Payer: MEDICAID

## 2023-02-08 DIAGNOSIS — R26.89 ANTALGIC GAIT: ICD-10-CM

## 2023-02-08 DIAGNOSIS — M00.061 STAPHYLOCOCCAL ARTHRITIS OF RIGHT KNEE: Primary | ICD-10-CM

## 2023-02-08 DIAGNOSIS — S98.912D AMPUTATION OF LEFT FOOT, SUBSEQUENT ENCOUNTER: ICD-10-CM

## 2023-02-08 PROCEDURE — 97530 THERAPEUTIC ACTIVITIES: CPT | Performed by: PHYSICAL THERAPIST

## 2023-02-08 PROCEDURE — 97110 THERAPEUTIC EXERCISES: CPT | Performed by: PHYSICAL THERAPIST

## 2023-02-08 PROCEDURE — 97112 NEUROMUSCULAR REEDUCATION: CPT | Performed by: PHYSICAL THERAPIST

## 2023-02-08 NOTE — PROGRESS NOTES
Physical Therapy Daily Treatment Note      Patient: Melchor Hardwick III   : 1965  Referring practitioner: Liyah Hill DO  Date of Initial Visit: Type: THERAPY  Noted: 2023  Today's Date: 2023  Patient seen for 7 sessions       Visit Diagnoses:    ICD-10-CM ICD-9-CM   1. Staphylococcal arthritis of right knee ([facility])  M00.061 711.06     041.10   2. Amputation of left foot, subsequent encounter ([facility])  S98.912D V54.89     896.0   3. Antalgic gait  R26.89 781.2       Subjective Evaluation    History of Present Illness    Subjective comment: Patient reports 2/10 pain today.  He notes that he feels like he is starting to get around better.Pain  Current pain ratin           Objective   See Exercise, Manual, and Modality Logs for complete treatment.       Assessment & Plan     Assessment    Assessment details: Patient tolerated today's session well, with rest breaks provided as necessary.  Therapy session consisted of there ex, therapeutic activities, and neuromuscular re-education.  Patient gave good effort during session and appears well motivated.  Patient challenged with stepping over balance pods, with no UE support; patient was provided with CGA for safety.  Patient had greatest difficulty with side stepping over balance pods, with patient displaying 1 loss of balance, requiring min assist to help patient correct balance.  Patient noted no increase in pain, but did report fatigue at conclusion of therapy.  He will continue to be progressed per his tolerance and POC.          Timed:         Manual Therapy:         mins  01219;     Therapeutic Exercise:    33     mins  04884;     Neuromuscular Mojgan:    13    mins  19375;    Therapeutic Activity:     9     mins  03283;     Gait Training:           mins  96892;     Ultrasound:          mins  17902;    Ionto                                   mins   09482  Self Care                            mins   52485  [facility]         mins  33608      Un-Timed:  Electrical Stimulation:         mins  11077 ( );  Dry Needling          mins self-pay  Traction          mins 00620      Timed Treatment:   55   mins   Total Treatment:     55   mins    JANNETH Morelos License: [id number]  Electronically signed by Sara Pineda PT, 02/08/23, 11:02 AM EST.

## 2023-02-10 ENCOUNTER — TREATMENT (OUTPATIENT)
Dept: PHYSICAL THERAPY | Facility: CLINIC | Age: 58
End: 2023-02-10
Payer: MEDICAID

## 2023-02-10 DIAGNOSIS — S98.912D AMPUTATION OF LEFT FOOT, SUBSEQUENT ENCOUNTER: ICD-10-CM

## 2023-02-10 DIAGNOSIS — M00.061 STAPHYLOCOCCAL ARTHRITIS OF RIGHT KNEE: Primary | ICD-10-CM

## 2023-02-10 DIAGNOSIS — R26.89 ANTALGIC GAIT: ICD-10-CM

## 2023-02-10 PROCEDURE — 97112 NEUROMUSCULAR REEDUCATION: CPT | Performed by: PHYSICAL THERAPIST

## 2023-02-10 PROCEDURE — 97110 THERAPEUTIC EXERCISES: CPT | Performed by: PHYSICAL THERAPIST

## 2023-02-10 PROCEDURE — 97530 THERAPEUTIC ACTIVITIES: CPT | Performed by: PHYSICAL THERAPIST

## 2023-02-10 NOTE — PROGRESS NOTES
"Physical Therapy Daily Treatment Note      Patient: Melchor Hardwick III   : 1965  Referring practitioner: Liyah Hill DO  Date of Initial Visit: Type: THERAPY  Noted: 2023  Today's Date: 2/10/2023  Patient seen for 8 sessions       Visit Diagnoses:    ICD-10-CM ICD-9-CM   1. Staphylococcal arthritis of right knee ([facility])  M00.061 711.06     041.10   2. Amputation of left foot, subsequent encounter ([facility])  S98.912D V54.89     896.0   3. Antalgic gait  R26.89 781.2       Subjective Evaluation    History of Present Illness    Subjective comment: Patient reports that he has no pain in his knee or foot, but mentions that his back has been bothering him some.       Objective   See Exercise, Manual, and Modality Logs for complete treatment.       Assessment & Plan     Assessment    Assessment details: Patient tolerated therapy session well, with rest breaks provided as necessary.  Patient is pleasant and gave good effort throughout session.  There ex focused on LE strengthening, with patient in supine and seated position.  Patient progressed in therapeutic activities to include step ups on 6\" step.  Occasional loss of balance was noted with forward/retro/side stepping with agility dots.  Patient tolerated cone reaching well, but did have some difficulty when reaching outside of CODIE.  He will continue to be progressed per his tolerance and POC.          Timed:         Manual Therapy:         mins  54875;     Therapeutic Exercise:    29     mins  18676;     Neuromuscular Mojgan:    16    mins  09896;    Therapeutic Activity:     12     mins  82139;     Gait Training:           mins  72500;     Ultrasound:          mins  66888;    Ionto                                   mins   31478  Self Care                            mins   50565  Canalith Repos         mins 13196      Un-Timed:  Electrical Stimulation:         mins  83668 ( );  Dry Needling          mins self-pay  Traction          mins " 54101      Timed Treatment:   57   mins   Total Treatment:     57   mins    Sara Pineda PT  [place] License: [id number]  Electronically signed by Sara Pineda PT, 02/10/23, 10:04 AM EST.

## 2023-02-13 ENCOUNTER — TREATMENT (OUTPATIENT)
Dept: PHYSICAL THERAPY | Facility: CLINIC | Age: 58
End: 2023-02-13
Payer: MEDICAID

## 2023-02-13 DIAGNOSIS — R26.89 ANTALGIC GAIT: ICD-10-CM

## 2023-02-13 DIAGNOSIS — M00.061 STAPHYLOCOCCAL ARTHRITIS OF RIGHT KNEE: Primary | ICD-10-CM

## 2023-02-13 DIAGNOSIS — S98.912D AMPUTATION OF LEFT FOOT, SUBSEQUENT ENCOUNTER: ICD-10-CM

## 2023-02-13 PROCEDURE — 97530 THERAPEUTIC ACTIVITIES: CPT | Performed by: PHYSICAL THERAPIST

## 2023-02-13 PROCEDURE — 97116 GAIT TRAINING THERAPY: CPT | Performed by: PHYSICAL THERAPIST

## 2023-02-13 PROCEDURE — 97110 THERAPEUTIC EXERCISES: CPT | Performed by: PHYSICAL THERAPIST

## 2023-02-13 NOTE — PROGRESS NOTES
Physical Therapy Re Certification Of Plan of Care  Patient: Melchor Hardwick III   : 1965  Diagnosis/ICD-10 Code:  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]  Referring practitioner: Liyah Hill DO  Date of Initial Visit: Type: THERAPY  Noted: 2023  Today's Date: 2023  Patient seen for 9 sessions         Visit Diagnoses:    ICD-10-CM ICD-9-CM   1. Staphylococcal arthritis of right knee (HCC)  M00.061 711.06     041.10   2. Amputation of left foot, subsequent encounter ([facility])  S98.912D V54.89     896.0   3. Antalgic gait  R26.89 781.2         Melchor Hardwick reports:   Subjective Questionnaire: LEFS:   Clinical Progress: improved  Home Program Compliance: Yes  Treatment has included: therapeutic exercise, neuromuscular re-education, manual therapy, therapeutic activity, gait training, moist heat and cryotherapy      Subjective Evaluation    History of Present Illness    Subjective comment: Pt is motivated to continue for max gains.  Pt reports therapy has helped with his walking and transfers.Pain  Current pain ratin  At best pain ratin  At worst pain ratin             Objective          Active Range of Motion     Right Knee   Flexion: 96 degrees     Additional Active Range of Motion Details  Right knee lacks 7 degrees from full ext    Strength/Myotome Testing     Left Hip   Planes of Motion   Flexion: 4-    Right Hip   Planes of Motion   Flexion: 4-    Left Knee   Flexion: 4  Extension: 4    Right Knee   Flexion: 4  Extension: 4          Assessment & Plan     Assessment  Impairments: abnormal coordination, abnormal gait, abnormal muscle firing, abnormal muscle tone, abnormal or restricted ROM, activity intolerance, impaired balance, impaired physical strength, lacks appropriate home exercise program, pain with function, safety issue and weight-bearing intolerance  Functional Limitations: carrying objects, walking, pushing, uncomfortable because of pain, standing and  unable to perform repetitive tasks  Assessment details: Pt is a 56 y/o male referred to therapy following a left partial foot amputation and right knee septic surgery.  Pt has attended 9 sessions with good effort and improved mobility.  Pt has demonstrated gains with knee mobility, decreased pain, improved strength and improved gait.  Pt is motivated an will cont to benefit from therapy services for reduced risk for falls and improved balance and strength.  Prognosis: good    Goals  Plan Goals: STG 4 weeks    1 Pt will be instructed in a HEP.met  2 Pt will demonstrate 4+/5 gross LE strength.progressing  3 Pt will report pain no greater than 5/10 with ADLs.met    LTG 8 weeks    1 Pt will demonstrate right knee ROM 3-110.not met  2 Pt will report pain no greater than 3/10 with prolonged standing.not met  3 Pt will amb household distances with sc or lesser AD safely.progressing    Plan  Therapy options: will be seen for skilled therapy services  Planned modality interventions: cryotherapy, electrical stimulation/Russian stimulation, thermotherapy (hydrocollator packs) and ultrasound  Planned therapy interventions: ADL retraining, flexibility, gait training, home exercise program, IADL retraining, joint mobilization, manual therapy, neuromuscular re-education, postural training, soft tissue mobilization, strengthening, stretching, therapeutic activities, transfer training and motor coordination training  Frequency: 2x week  Duration in weeks: 8  Treatment plan discussed with: patient  Plan details: Will follow for optimal gains.    Moderate Evaluation  40178  Therapeutic exercise  25170  Therapeutic activity    87368  Neuromuscular re-education   46741  Manual therapy   70153  Gait training  82194  Attended e-stim  36366  Unattended e-stim (Medicaid/Medicare)     Moist heat/cryotherapy 49278   Ultrasound   10419               Recommendations: Continue as planned  Timeframe: 2 months  Prognosis to achieve goals:  good      Timed:         Manual Therapy:         mins  80488;     Therapeutic Exercise:    18     mins  08536;     Neuromuscular Mojgan:        mins  92407;    Therapeutic Activity:     16     mins  90185;     Gait Trainin     mins  02946;     Ultrasound:          mins  75883;    Ionto                                   mins   79734  Self Care                            mins   53455  Canalith Repos         mins 94806      Un-Timed:  Electrical Stimulation:         mins  83787 ([facility] );  Dry Needling          mins self-pay  Traction          mins 69256  Re-Eval                               mins  27927      Timed Treatment:   46   mins   Total Treatment:     46   mins          PT: Nir Song PT     [place] License:  [id number]  Electronically signed by Nir Song PT, 23, 8:31 AM EST    Certification Period: 2023 thru 2023  I certify that the therapy services are furnished while this patient is under my care.  The services outlined above are required by this patient, and will be reviewed every 90 days.         Physician Signature:__________________________________________________    PHYSICIAN: Liyah Christianson DO  NPI: [id number]                                      DATE:  :     Please sign and return via fax to [email or web address] . Thank you, [facility]

## 2023-02-14 ENCOUNTER — TREATMENT (OUTPATIENT)
Dept: PHYSICAL THERAPY | Facility: CLINIC | Age: 58
End: 2023-02-14
Payer: MEDICAID

## 2023-02-14 DIAGNOSIS — R26.89 ANTALGIC GAIT: ICD-10-CM

## 2023-02-14 DIAGNOSIS — M00.061 STAPHYLOCOCCAL ARTHRITIS OF RIGHT KNEE: Primary | ICD-10-CM

## 2023-02-14 DIAGNOSIS — S98.912D AMPUTATION OF LEFT FOOT, SUBSEQUENT ENCOUNTER: ICD-10-CM

## 2023-02-14 PROCEDURE — 97112 NEUROMUSCULAR REEDUCATION: CPT | Performed by: PHYSICAL THERAPIST

## 2023-02-14 PROCEDURE — 97116 GAIT TRAINING THERAPY: CPT | Performed by: PHYSICAL THERAPIST

## 2023-02-14 PROCEDURE — 97110 THERAPEUTIC EXERCISES: CPT | Performed by: PHYSICAL THERAPIST

## 2023-02-14 NOTE — PROGRESS NOTES
Physical Therapy Daily Treatment Note      Patient: Melchor Hardwick III   : 1965  Referring practitioner: Liyah Hill DO  Date of Initial Visit: Type: THERAPY  Noted: 2023  Today's Date: 2023  Patient seen for 10 sessions       Visit Diagnoses:    ICD-10-CM ICD-9-CM   1. Staphylococcal arthritis of right knee ([facility])  M00.061 711.06     041.10   2. Amputation of left foot, subsequent encounter ([facility])  S98.912D V54.89     896.0   3. Antalgic gait  R26.89 781.2       Subjective: Patient reports doing well this morning, with some stiffness in the right knee. Pt states of no pain.      Objective   See Exercise, Manual, and Modality Logs for complete treatment.       Assessment/Plan: Patient responded well to today's session and was provided with rest breaks as needed due to fatigue. Treatment consisted of therex and neuro re-ed as listed f/b gait training w/ SC. Exercise performed to assist with improved bilateral LE strength, improved balance/ closed chain proprioception and gait. Activities progressed with resistance of theraband increased from red to green with hip abduction/ knee flexion, and forward stepping over objects with assistive device initiated. Pt observed with good tolerance, and was provided with cues and demonstration as needed for form; min assist provided for improved patient safety during balance activities. Pt continues to benefit from therapy services and will be progressed as tolerated to address goals, improve balance and gait. Continue with PT's POC.         Timed:         Manual Therapy:         mins  78222;     Therapeutic Exercise:    20     mins  99213;     Neuromuscular Mojgan:   17     mins  98002;    Therapeutic Activity:          mins  97642;     Gait Training:      10     mins  61594;     Ultrasound:          mins  64350;    Ionto                                   mins   50707  Self Care                            mins   20002  [facility] Repos         mins  65605      Un-Timed:  Electrical Stimulation:         mins  25032 ( );  Dry Needling          mins self-pay  Traction          mins 03175      Timed Treatment:   47   mins   Total Treatment:    47    mins    Ary Malin. LIZETT Cardoso  [place] License: [id number]

## 2023-02-16 ENCOUNTER — LAB (OUTPATIENT)
Dept: LAB | Facility: HOSPITAL | Age: 58
End: 2023-02-16
Payer: MEDICAID

## 2023-02-16 DIAGNOSIS — B95.62 MRSA BACTEREMIA: ICD-10-CM

## 2023-02-16 DIAGNOSIS — M00.061 STAPHYLOCOCCAL ARTHRITIS OF RIGHT KNEE: ICD-10-CM

## 2023-02-16 DIAGNOSIS — R78.81 MRSA BACTEREMIA: ICD-10-CM

## 2023-02-16 DIAGNOSIS — I07.9 ENDOCARDITIS OF TRICUSPID VALVE: ICD-10-CM

## 2023-02-16 LAB
ALBUMIN SERPL-MCNC: 3.3 G/DL (ref 3.5–5.2)
ALBUMIN/GLOB SERPL: 1 G/DL
ALP SERPL-CCNC: 151 U/L (ref 39–117)
ALT SERPL W P-5'-P-CCNC: 21 U/L (ref 1–41)
ANION GAP SERPL CALCULATED.3IONS-SCNC: 8.3 MMOL/L (ref 5–15)
AST SERPL-CCNC: 42 U/L (ref 1–40)
BASOPHILS # BLD AUTO: 0.01 10*3/MM3 (ref 0–0.2)
BASOPHILS NFR BLD AUTO: 0.3 % (ref 0–1.5)
BILIRUB SERPL-MCNC: 0.7 MG/DL (ref 0–1.2)
BUN SERPL-MCNC: 14 MG/DL (ref 6–20)
BUN/CREAT SERPL: 16.3 (ref 7–25)
CALCIUM SPEC-SCNC: 8.6 MG/DL (ref 8.6–10.5)
CHLORIDE SERPL-SCNC: 103 MMOL/L (ref 98–107)
CO2 SERPL-SCNC: 26.7 MMOL/L (ref 22–29)
CREAT SERPL-MCNC: 0.86 MG/DL (ref 0.76–1.27)
CRP SERPL-MCNC: 1.1 MG/DL (ref 0–0.5)
DEPRECATED RDW RBC AUTO: 52.6 FL (ref 37–54)
EGFRCR SERPLBLD CKD-EPI 2021: 101 ML/MIN/1.73
EOSINOPHIL # BLD AUTO: 0.08 10*3/MM3 (ref 0–0.4)
EOSINOPHIL NFR BLD AUTO: 2.4 % (ref 0.3–6.2)
ERYTHROCYTE [DISTWIDTH] IN BLOOD BY AUTOMATED COUNT: 18.1 % (ref 12.3–15.4)
GLOBULIN UR ELPH-MCNC: 3.2 GM/DL
GLUCOSE SERPL-MCNC: 176 MG/DL (ref 65–99)
HCT VFR BLD AUTO: 36.4 % (ref 37.5–51)
HGB BLD-MCNC: 11.4 G/DL (ref 13–17.7)
IMM GRANULOCYTES # BLD AUTO: 0 10*3/MM3 (ref 0–0.05)
IMM GRANULOCYTES NFR BLD AUTO: 0 % (ref 0–0.5)
LYMPHOCYTES # BLD AUTO: 0.72 10*3/MM3 (ref 0.7–3.1)
LYMPHOCYTES NFR BLD AUTO: 21.7 % (ref 19.6–45.3)
MCH RBC QN AUTO: 25.1 PG (ref 26.6–33)
MCHC RBC AUTO-ENTMCNC: 31.3 G/DL (ref 31.5–35.7)
MCV RBC AUTO: 80.2 FL (ref 79–97)
MONOCYTES # BLD AUTO: 0.28 10*3/MM3 (ref 0.1–0.9)
MONOCYTES NFR BLD AUTO: 8.4 % (ref 5–12)
NEUTROPHILS NFR BLD AUTO: 2.23 10*3/MM3 (ref 1.7–7)
NEUTROPHILS NFR BLD AUTO: 67.2 % (ref 42.7–76)
NRBC BLD AUTO-RTO: 0 /100 WBC (ref 0–0.2)
PLATELET # BLD AUTO: 73 10*3/MM3 (ref 140–450)
PMV BLD AUTO: 10 FL (ref 6–12)
POTASSIUM SERPL-SCNC: 4.5 MMOL/L (ref 3.5–5.2)
PROT SERPL-MCNC: 6.5 G/DL (ref 6–8.5)
RBC # BLD AUTO: 4.54 10*6/MM3 (ref 4.14–5.8)
SODIUM SERPL-SCNC: 138 MMOL/L (ref 136–145)
WBC NRBC COR # BLD: 3.32 10*3/MM3 (ref 3.4–10.8)

## 2023-02-16 PROCEDURE — 80053 COMPREHEN METABOLIC PANEL: CPT

## 2023-02-16 PROCEDURE — 85025 COMPLETE CBC W/AUTO DIFF WBC: CPT

## 2023-02-16 PROCEDURE — 36415 COLL VENOUS BLD VENIPUNCTURE: CPT

## 2023-02-16 PROCEDURE — 86140 C-REACTIVE PROTEIN: CPT

## 2023-02-20 ENCOUNTER — TREATMENT (OUTPATIENT)
Dept: PHYSICAL THERAPY | Facility: CLINIC | Age: 58
End: 2023-02-20
Payer: MEDICAID

## 2023-02-20 DIAGNOSIS — M00.061 STAPHYLOCOCCAL ARTHRITIS OF RIGHT KNEE: Primary | ICD-10-CM

## 2023-02-20 DIAGNOSIS — S98.912D AMPUTATION OF LEFT FOOT, SUBSEQUENT ENCOUNTER: ICD-10-CM

## 2023-02-20 DIAGNOSIS — R26.89 ANTALGIC GAIT: ICD-10-CM

## 2023-02-20 PROCEDURE — 97112 NEUROMUSCULAR REEDUCATION: CPT | Performed by: PHYSICAL THERAPIST

## 2023-02-20 PROCEDURE — 97110 THERAPEUTIC EXERCISES: CPT | Performed by: PHYSICAL THERAPIST

## 2023-02-20 PROCEDURE — 97116 GAIT TRAINING THERAPY: CPT | Performed by: PHYSICAL THERAPIST

## 2023-02-20 NOTE — PROGRESS NOTES
Physical Therapy Daily Treatment Note      Patient: Melchor Hardwick III   : 1965  Referring practitioner: Liyah Hill DO  Date of Initial Visit: Type: THERAPY  Noted: 2023  Today's Date: 2023  Patient seen for 11 sessions       Visit Diagnoses:    ICD-10-CM ICD-9-CM   1. Staphylococcal arthritis of right knee ([facility])  M00.061 711.06     041.10   2. Amputation of left foot, subsequent encounter ([facility])  S98.912D V54.89     896.0   3. Antalgic gait  R26.89 781.2       Subjective Evaluation    History of Present Illness    Subjective comment: Pt has 2/10 pain today.       Objective   See Exercise, Manual, and Modality Logs for complete treatment.       Assessment & Plan     Assessment    Assessment details: Tx today consisted of supine and sitting exercises for improved hip and knee stability; proprioceptive training for improved balance and GT with use of a sc.  Pt demonstrated good effort with treatment today with no distress.  Pt required increased encouragement with foam step ups today due to fear of falling.  Pt noted to have improved velocity with gait.    Plan  Plan details: Will follow progressing leg stability and improved balance.          Timed:         Manual Therapy:         mins  50652;     Therapeutic Exercise:    18     mins  70620;     Neuromuscular Mojgan:    15    mins  29147;    Therapeutic Activity:          mins  32928;     Gait Trainin     mins  85510;     Ultrasound:          mins  48461;    Ionto                                   mins   49649  Self Care                            mins   29233  Canalith Repos         mins 99849      Un-Timed:  Electrical Stimulation:         mins  28318 (MC );  Dry Needling          mins self-pay  Traction          mins 11767      Timed Treatment:   47   mins   Total Treatment:     47   mins    Nir Song PT  [place] License: [id number]      Electronically signed by Nir Song PT, 23, 8:28 AM EST

## 2023-02-22 ENCOUNTER — TREATMENT (OUTPATIENT)
Dept: PHYSICAL THERAPY | Facility: CLINIC | Age: 58
End: 2023-02-22
Payer: MEDICAID

## 2023-02-22 DIAGNOSIS — R26.89 ANTALGIC GAIT: ICD-10-CM

## 2023-02-22 DIAGNOSIS — S98.912D AMPUTATION OF LEFT FOOT, SUBSEQUENT ENCOUNTER: ICD-10-CM

## 2023-02-22 DIAGNOSIS — M00.061 STAPHYLOCOCCAL ARTHRITIS OF RIGHT KNEE: Primary | ICD-10-CM

## 2023-02-22 PROCEDURE — 97530 THERAPEUTIC ACTIVITIES: CPT | Performed by: PHYSICAL THERAPIST

## 2023-02-22 PROCEDURE — 97110 THERAPEUTIC EXERCISES: CPT | Performed by: PHYSICAL THERAPIST

## 2023-02-22 PROCEDURE — 97116 GAIT TRAINING THERAPY: CPT | Performed by: PHYSICAL THERAPIST

## 2023-02-22 NOTE — PROGRESS NOTES
Physical Therapy Daily Treatment Note      Patient: Melchor Hardwick III   : 1965  Referring practitioner: Liyah Hill DO  Date of Initial Visit: Type: THERAPY  Noted: 2023  Today's Date: 2023  Patient seen for 12 sessions       Visit Diagnoses:    ICD-10-CM ICD-9-CM   1. Staphylococcal arthritis of right knee ([facility])  M00.061 711.06     041.10   2. Amputation of left foot, subsequent encounter ([facility])  S98.912D V54.89     896.0   3. Antalgic gait  R26.89 781.2       Subjective Evaluation    History of Present Illness    Subjective comment: Patient notes 2/10 pain today.  Pain  Current pain ratin           Objective   See Exercise, Manual, and Modality Logs for complete treatment.       Assessment & Plan     Assessment    Assessment details: Therapy session consisted of there ex, therapeutic activities, and gait training.  Gait training performed, with patient requiring cues for AD placement.  Patient was progressed with standing exercises to include the addition of standing march on airex and increased repetitions of foam step ups.  During standing activities, patient reported that he was experiencing some dizziness and believed it was due to an inner ear infection he has been diagnosed with; with a short seated rest break dizziness improved and patient was able to continue with session.  Patient tolerated there ex well, which included exercises that focused on LE strengthening and ROM.  Patient will continue to be progressed per his tolerance and POC; will monitor patient, as necessary, for dizziness.          Timed:         Manual Therapy:         mins  82904;     Therapeutic Exercise:    28     mins  90478;     Neuromuscular Mojgan:        mins  74155;    Therapeutic Activity:     15     mins  98962;     Gait Training:      10     mins  68337;     Ultrasound:          mins  81179;    Ionto                                   mins   17575  Self Care                            mins    41700  [facility]         mins 50979      Un-Timed:  Electrical Stimulation:         mins  04826 ( );  Dry Needling          mins self-pay  Traction          mins 25260      Timed Treatment:   53   mins   Total Treatment:     53   mins    Sara Pineda PT  [place] License: [id number]  Electronically signed by Sara Pineda PT, 02/22/23, 12:12 PM EST.

## 2023-02-23 ENCOUNTER — OFFICE VISIT (OUTPATIENT)
Dept: INFECTIOUS DISEASES | Facility: CLINIC | Age: 58
End: 2023-02-23
Payer: MEDICAID

## 2023-02-23 VITALS
BODY MASS INDEX: 32.27 KG/M2 | DIASTOLIC BLOOD PRESSURE: 74 MMHG | HEIGHT: 70 IN | HEART RATE: 87 BPM | OXYGEN SATURATION: 98 % | WEIGHT: 225.4 LBS | SYSTOLIC BLOOD PRESSURE: 134 MMHG | TEMPERATURE: 98.1 F

## 2023-02-23 DIAGNOSIS — Z96.651 STATUS POST TOTAL RIGHT KNEE REPLACEMENT: ICD-10-CM

## 2023-02-23 DIAGNOSIS — M00.061 STAPHYLOCOCCAL ARTHRITIS OF RIGHT KNEE: Primary | ICD-10-CM

## 2023-02-23 DIAGNOSIS — K74.69 OTHER CIRRHOSIS OF LIVER: ICD-10-CM

## 2023-02-23 PROCEDURE — 99214 OFFICE O/P EST MOD 30 MIN: CPT | Performed by: INTERNAL MEDICINE

## 2023-02-23 NOTE — PROGRESS NOTES
[facility]         Referring Provider: No referring provider defined for this encounter.    Subjective      Chief Complaint  Blood Infection (Pt states that doctor K is watching his blood to make sure his knee doesn't get infected)    History of Present Illness  Melchor Hardwick III is a 57 y.o. male who presents today to [facility] for Follow Up . 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 Knee looks and feels great, going to PT.    Past Medical History:   Diagnosis Date   • Diabetes mellitus (HCC)     4 times per day gets checked for sugar at rehab   • Dizzy    • Hyperlipidemia    • Hypertension    • Hypothyroid 11/30/2022   • MRSA infection     blood -   2022   • Orthostatic hypotension    • Pressure sore on buttocks     top crack -  sees wound - but now rn take care of it at rehab - minor opening - dime size - pat nurse didnt assess-  pt states getting better   • Pyogenic arthritis of right knee joint, due to unspecified organism (HCC) 09/21/2022   • Sepsis ([facility])    • Wears glasses     readers       Past Surgical History:   Procedure Laterality Date   • ABSCESS DRAINAGE  2004    PERINEUM   • AMPUTATION FOOT Left 05/18/2022    Procedure: AMPUTATION FOOT;  Surgeon: Addison Colby MD;  Location: [facility] OR;  Service: Podiatry;  Laterality: Left;   • INCISION AND DRAINAGE LEG Left 05/10/2022    Procedure: INCISION AND DRAINAGE LOWER EXTREMITY;  Surgeon: Addison Colby MD;  Location: [facility] OR;  Service: Podiatry;  Laterality: Left;   • KNEE INCISION AND DRAINAGE Right 09/21/2022    Procedure: KNEE INCISION AND DRAINAGE RIGHT;  Surgeon: Brain Bentley MD;  Location: [facility] OR;  Service: Orthopedics;  Laterality: Right;   • PERIPHERALLY INSERTED CENTRAL CATHETER INSERTION Left    • TOTAL KNEE  PROSTHESIS REMOVAL W/ SPACER INSERTION Right 11/30/2022    Procedure:  ANTIBIOTIC SPACER PLACEMENT KNEE - RIGHT;  Surgeon: Brain Bentley MD;  Location:  [facility] OR;  Service: Orthopedics;  Laterality: Right;   • WOUND DEBRIDEMENT Left 05/13/2022    Procedure: DEBRIDEMENT FOOT;  Surgeon: Addison Colby MD;  Location:  [facility] OR;  Service: Podiatry;  Laterality: Left;       Social History     Socioeconomic History   • Marital status: Single   Tobacco Use   • Smoking status: Never   • Smokeless tobacco: Never   Vaping Use   • Vaping Use: Never used   Substance and Sexual Activity   • Alcohol use: Never   • Drug use: Never   • Sexual activity: Defer       Family History  family history is not on file.      There is no immunization history on file for this patient.     Allergies  No Known Allergies    The medication list has been reviewed and updated.   Current Medications    Current Outpatient Medications:   •  acetaminophen (TYLENOL) 325 MG tablet, Take 2 tablets by mouth Every 4 (Four) Hours As Needed for Mild Pain., Disp: , Rfl:   •  ascorbic acid (VITAMIN C) 500 MG tablet, Take 1 tablet by mouth Daily., Disp: , Rfl:   •  bisacodyl (DULCOLAX) 10 MG suppository, Insert 1 suppository into the rectum Daily As Needed for Constipation., Disp: , Rfl:   •  cyclobenzaprine (FLEXERIL) 5 MG tablet, Take 1 tablet by mouth 3 (Three) Times a Day As Needed for Muscle Spasms., Disp: , Rfl:   •  Diclofenac Sodium (VOLTAREN) 1 % gel gel, Apply 4 g topically to the appropriate area as directed 4 (Four) Times a Day As Needed., Disp: , Rfl:   •  docusate sodium 100 MG capsule, Take 1 capsule by mouth 2 (Two) Times a Day., Disp: , Rfl:   •  doxycycline (VIBRAMYICN) 100 MG tablet, Take 100 mg by mouth 2 (Two) Times a Day., Disp: , Rfl:   •  fludrocortisone 0.1 MG tablet, Take 0.5 tablets by mouth Daily., Disp: , Rfl:   •  Insulin Aspart (novoLOG) 100 UNIT/ML injection, Inject 0-14 Units under the skin into the appropriate area as directed 3 (Three) Times a Day Before Meals., Disp: , Rfl: 12  •  Insulin  Aspart (novoLOG) 100 UNIT/ML injection, Inject 4 Units under the skin into the appropriate area as directed 3 (Three) Times a Day Before Meals., Disp: , Rfl: 12  •  insulin detemir (LEVEMIR) 100 UNIT/ML injection, Inject 17 Units under the skin into the appropriate area as directed Every 12 (Twelve) Hours., Disp: , Rfl: 12  •  levothyroxine (SYNTHROID, LEVOTHROID) 50 MCG tablet, Take 50 mcg by mouth Daily., Disp: , Rfl:   •  meloxicam (MOBIC) 15 MG tablet, Take 1 tablet by mouth Daily., Disp: , Rfl:   •  midodrine (PROAMATINE) 2.5 MG tablet, Take 1 tablet by mouth 3 (Three) Times a Day Before Meals. Hold if SBP is above 120, Disp: , Rfl:   •  multivitamin with minerals tablet tablet, Take 1 tablet by mouth Daily., Disp: , Rfl:   •  pantoprazole (PROTONIX) 40 MG EC tablet, Take 1 tablet by mouth Every Morning., Disp: , Rfl:   •  polyethylene glycol (MIRALAX) 17 g packet, Take 17 g by mouth Daily., Disp: , Rfl:   •  pregabalin (LYRICA) 100 MG capsule, Take 1 capsule by mouth Every 12 (Twelve) Hours., Disp: , Rfl:   •  Vitamins A & D (magic barrier cream), Apply 1 application topically to the appropriate area as directed As Needed (skin irritation)., Disp: , Rfl:       Review of Systems    Review of Systems   Constitutional: Negative for activity change, appetite change, chills, diaphoresis and fever.   HENT: Negative for congestion, dental problem, drooling, mouth sores, sinus pressure and sneezing.    Eyes: Negative for blurred vision, double vision, photophobia and discharge.   Respiratory: Negative for apnea, cough, choking, chest tightness, shortness of breath and wheezing.    Cardiovascular: Negative for chest pain, palpitations and leg swelling.   Gastrointestinal: Negative for abdominal distention, abdominal pain, diarrhea, nausea and vomiting.   Genitourinary: Negative for dysuria, flank pain and frequency.   Musculoskeletal: Positive for arthralgias (right knee, chronic). Negative for gait problem, neck pain  "and neck stiffness.   Skin: Negative for rash.   Neurological: Negative for dizziness, tremors, seizures, syncope, speech difficulty, numbness, headache and confusion.   Psychiatric/Behavioral: Negative for agitation, behavioral problems, hallucinations and suicidal ideas.        Objective     Vital Signs:  /74 (BP Location: Right arm, Patient Position: Sitting, Cuff Size: Small Adult)   Pulse 87   Temp 98.1 °F (36.7 °C) (Temporal)   Ht 177.8 cm (70\")   Wt 102 kg (225 lb 6.4 oz)   SpO2 98%   BMI 32.34 kg/m²   Estimated body mass index is 32.34 kg/m² as calculated from the following:    Height as of this encounter: 177.8 cm (70\").    Weight as of this encounter: 102 kg (225 lb 6.4 oz).    Physical Exam  Constitutional:       General: He is not in acute distress.     Appearance: Normal appearance. He is normal weight. He is not ill-appearing, toxic-appearing or diaphoretic.   HENT:      Head: Normocephalic and atraumatic.      Nose: Nose normal. No congestion or rhinorrhea.      Mouth/Throat:      Mouth: Mucous membranes are moist.      Pharynx: Oropharynx is clear.   Eyes:      General: No scleral icterus.     Extraocular Movements: Extraocular movements intact.      Pupils: Pupils are equal, round, and reactive to light.   Neck:      Vascular: No carotid bruit.   Cardiovascular:      Rate and Rhythm: Normal rate and regular rhythm.      Pulses: Normal pulses.      Heart sounds: No murmur heard.  Pulmonary:      Effort: Pulmonary effort is normal. No respiratory distress.      Breath sounds: Normal breath sounds. No stridor. No wheezing, rhonchi or rales.   Chest:      Chest wall: No tenderness.   Abdominal:      General: Abdomen is flat. Bowel sounds are normal. There is no distension.      Palpations: Abdomen is soft.      Tenderness: There is no abdominal tenderness. There is no guarding or rebound.   Musculoskeletal:         General: Swelling present.      Cervical back: Normal range of motion and " neck supple. No rigidity or tenderness.      Comments: Right knee surgical wound looks clean with no redness, no warmth and no effusion   Lymphadenopathy:      Cervical: No cervical adenopathy.   Skin:     Coloration: Skin is not jaundiced.      Findings: No lesion or rash.   Neurological:      General: No focal deficit present.      Mental Status: He is alert and oriented to person, place, and time.   Psychiatric:         Mood and Affect: Mood normal.         Behavior: Behavior normal.          Result Review :  The following data was reviewed by Marilynn Giordano MD     Lab Results  Lab Results   Component Value Date    WBC 3.32 (L) 02/16/2023    HGB 11.4 (L) 02/16/2023    HCT 36.4 (L) 02/16/2023    MCV 80.2 02/16/2023    PLT 73 (L) 02/16/2023     Lab Results   Component Value Date    GLUCOSE 176 (H) 02/16/2023    BUN 14 02/16/2023    CREATININE 0.86 02/16/2023    BCR 16.3 02/16/2023    K 4.5 02/16/2023    CO2 26.7 02/16/2023    CALCIUM 8.6 02/16/2023    ALBUMIN 3.3 (L) 02/16/2023    AST 42 (H) 02/16/2023    ALT 21 02/16/2023      Lab Results   Component Value Date    CRP 1.10 (H) 02/16/2023        No results found for: ACANTHNAEG, AFBCX, BPERTUSSISCX, BLOODCX  No results found for: BCIDPCR, CXREFLEX, CSFCX, CULTURETIS  No results found for: CULTURES, HSVCX, URCX  No results found for: EYECULTURE, GCCX, HSVCULTURE, LABHSV  No results found for: LEGIONELLA, MRSACX, MUMPSCX, MYCOPLASCX  No results found for: NOCARDIACX, STOOLCX  No results found for: THROATCX, UNSTIMCULT, URINECX, CULTURE, VZVCULTUR  No results found for: VIRALCULTU, WOUNDCX    Radiology Results              Assessment / Plan        Diagnoses and all orders for this visit:    1. Staphylococcal arthritis of right knee (HCC) (Primary)  -     CBC & Differential; Future  -     Comprehensive Metabolic Panel; Future  -     C-reactive Protein; Future    2. Status post total right knee replacement  -     CBC & Differential; Future  -     Comprehensive  Metabolic Panel; Future  -     C-reactive Protein; Future    3. Other cirrhosis of liver (HCC)  -     CBC & Differential; Future  -     Comprehensive Metabolic Panel; Future  -     C-reactive Protein; Future    Patient seems to be doing great with no evidence of active infection.    Blood work ordered for March 16 and follow up on March 21. I am concerned about underlying liver disease and cirrhosis.    Continue doxycyline suppressive therapy and consider changing dose to 100 mg daily if CRP is improved.          Follow Up   Return in about 26 days (around 3/21/2023) for Follow up, With Dr. Giordano.    Visit Diagnoses:    ICD-10-CM ICD-9-CM   1. Staphylococcal arthritis of right knee (HCC)  M00.061 711.06     041.10   2. Status post total right knee replacement  Z96.651 V43.65   3. Other cirrhosis of liver (HCC)  K74.69 571.5       Patient was given instructions and counseling regarding his condition or for health maintenance advice. Please see specific information pulled into the AVS if appropriate.     This document has been electronically signed by Marilynn Giordano MD   February 23, 2023 10:29 EST    Dictated Utilizing Dragon Dictation: Part of this note may be an electronic transcription/translation of spoken language to printed text using the Dragon Dictation System.

## 2023-02-27 ENCOUNTER — TREATMENT (OUTPATIENT)
Dept: PHYSICAL THERAPY | Facility: CLINIC | Age: 58
End: 2023-02-27
Payer: MEDICAID

## 2023-02-27 DIAGNOSIS — R26.89 ANTALGIC GAIT: ICD-10-CM

## 2023-02-27 DIAGNOSIS — M00.061 STAPHYLOCOCCAL ARTHRITIS OF RIGHT KNEE: Primary | ICD-10-CM

## 2023-02-27 DIAGNOSIS — S98.912D AMPUTATION OF LEFT FOOT, SUBSEQUENT ENCOUNTER: ICD-10-CM

## 2023-02-27 PROCEDURE — 97530 THERAPEUTIC ACTIVITIES: CPT | Performed by: PHYSICAL THERAPIST

## 2023-02-27 PROCEDURE — 97110 THERAPEUTIC EXERCISES: CPT | Performed by: PHYSICAL THERAPIST

## 2023-02-27 PROCEDURE — 97112 NEUROMUSCULAR REEDUCATION: CPT | Performed by: PHYSICAL THERAPIST

## 2023-02-27 NOTE — PROGRESS NOTES
Physical Therapy Daily Treatment Note      Patient: Melchor Hardwick III   : 1965  Referring practitioner: Liyah Hill DO  Date of Initial Visit: Type: THERAPY  Noted: 2023  Today's Date: 2023  Patient seen for 13 sessions       Visit Diagnoses:    ICD-10-CM ICD-9-CM   1. Staphylococcal arthritis of right knee ([facility])  M00.061 711.06     041.10   2. Amputation of left foot, subsequent encounter ([facility])  S98.912D V54.89     896.0   3. Antalgic gait  R26.89 781.2       Subjective Evaluation    History of Present Illness    Subjective comment: Pt has 2/10 pain today.       Objective   See Exercise, Manual, and Modality Logs for complete treatment.       Assessment & Plan     Assessment    Assessment details: Tx today consisted of supine exercises for improved leg and hip stability; followed by sitting exercises and proprioceptive training in II bars and pre gait activities.  Pt noted to have impairments with pod stepping and foam standing today.  Pt also had difficulty with semi tandem balance.    Plan  Plan details: Will follow progressing leg stability and improved balance.          Timed:         Manual Therapy:         mins  01194;     Therapeutic Exercise:    17     mins  91453;     Neuromuscular Mojgan:    16    mins  98268;    Therapeutic Activity:     15     mins  74602;     Gait Training:           mins  91530;     Ultrasound:          mins  08656;    Ionto                                   mins   10320  Self Care                            mins   43036  Canalith Repos         mins 50612      Un-Timed:  Electrical Stimulation:         mins  89452 ( );  Dry Needling          mins self-pay  Traction          mins 20452      Timed Treatment:   48   mins   Total Treatment:     48   mins    Nir Song PT  [place] License: [id number]      Electronically signed by Nir Song PT, 23, 8:26 AM EST

## 2023-03-01 ENCOUNTER — TREATMENT (OUTPATIENT)
Dept: PHYSICAL THERAPY | Facility: CLINIC | Age: 58
End: 2023-03-01
Payer: MEDICAID

## 2023-03-01 DIAGNOSIS — S98.912D AMPUTATION OF LEFT FOOT, SUBSEQUENT ENCOUNTER: ICD-10-CM

## 2023-03-01 DIAGNOSIS — R26.89 ANTALGIC GAIT: ICD-10-CM

## 2023-03-01 DIAGNOSIS — M00.061 STAPHYLOCOCCAL ARTHRITIS OF RIGHT KNEE: Primary | ICD-10-CM

## 2023-03-01 PROCEDURE — 97110 THERAPEUTIC EXERCISES: CPT | Performed by: PHYSICAL THERAPIST

## 2023-03-01 PROCEDURE — 97530 THERAPEUTIC ACTIVITIES: CPT | Performed by: PHYSICAL THERAPIST

## 2023-03-01 PROCEDURE — 97112 NEUROMUSCULAR REEDUCATION: CPT | Performed by: PHYSICAL THERAPIST

## 2023-03-01 NOTE — PROGRESS NOTES
Physical Therapy Daily Treatment Note      Patient: Melchor Hardwick III   : 1965  Referring practitioner: Liyah Hill DO  Date of Initial Visit: Type: THERAPY  Noted: 2023  Today's Date: 3/1/2023  Patient seen for 14 sessions       Visit Diagnoses:    ICD-10-CM ICD-9-CM   1. Staphylococcal arthritis of right knee ([facility])  M00.061 711.06     041.10   2. Amputation of left foot, subsequent encounter ([facility])  S98.912D V54.89     896.0   3. Antalgic gait  R26.89 781.2       Subjective: Patient reports 2/10 right knee pain upon arrival to therapy. Pt states his balance and tolerance to activity have improve since beginning therapy.      Objective   See Exercise, Manual, and Modality Logs for complete treatment.       Assessment/Plan: Patient responded well to today's session with rest breaks provided as needed due to fatigue. Treatment consisted of therex, therapeutic activities and neuro re-ed as per flow sheet to assist with improved bilateral LE strength, reduce fall risk, and to ease difficulty with performing ADL's. Exercise progressed with additional balance activities added including forward, retro, and side stepping with agility dots, w/out assistive device. Pt observed with good tolerance with intermittent contact guard to min assist provided for improved patient safety. Pt reported soreness along right lateral aspect of knee with standing marches; however tolerable. Tenderness noted along lateral border of patella during palpation. Cryotherapy applied to right knee at conclusion. Pt continues to benefit from therapy services and will be progressed as tolerated to address goals, improve gait, and mobility. Continue with PT's POC.       Timed:         Manual Therapy:         mins  72846;     Therapeutic Exercise:   34      mins  48146;     Neuromuscular Mojgan: 11     mins  86874;    Therapeutic Activity:     10     mins  96711;     Gait Training:           mins  36924;     Ultrasound:          mins   58068;    Ionto                                   mins   59649  Self Care                            mins   64742  Canalith Repos         mins 42141      Un-Timed:  Electrical Stimulation:         mins  55908 ( );  Dry Needling          mins self-pay  Traction          mins 79871      Timed Treatment:   55   mins   Total Treatment:     63   mins (CP: x 8 min)     Ary Malin. LIZETT Cardoso  [place] License: [id number]

## 2023-03-06 ENCOUNTER — TREATMENT (OUTPATIENT)
Dept: PHYSICAL THERAPY | Facility: CLINIC | Age: 58
End: 2023-03-06
Payer: MEDICAID

## 2023-03-06 DIAGNOSIS — M00.061 STAPHYLOCOCCAL ARTHRITIS OF RIGHT KNEE: Primary | ICD-10-CM

## 2023-03-06 DIAGNOSIS — S98.912D AMPUTATION OF LEFT FOOT, SUBSEQUENT ENCOUNTER: ICD-10-CM

## 2023-03-06 DIAGNOSIS — R26.89 ANTALGIC GAIT: ICD-10-CM

## 2023-03-06 PROCEDURE — 97110 THERAPEUTIC EXERCISES: CPT | Performed by: PHYSICAL THERAPIST

## 2023-03-06 PROCEDURE — 97530 THERAPEUTIC ACTIVITIES: CPT | Performed by: PHYSICAL THERAPIST

## 2023-03-06 PROCEDURE — 97112 NEUROMUSCULAR REEDUCATION: CPT | Performed by: PHYSICAL THERAPIST

## 2023-03-06 NOTE — PROGRESS NOTES
Physical Therapy Daily Treatment Note    Patient: Melchor Hardwick III   : 1965  Diagnosis/ICD-10 Code:  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]  Referring practitioner: Liyah Hill DO  Date of Initial Visit: Type: THERAPY  Noted: 2023  Today's Date: 3/6/2023  Patient seen for 15 sessions               Subjective Pt arrives today and reports that he is feeling well and goes to family doctor next week and will get new order for prosthesis.  He states he cont to have difficulty overall with balance     Objective   See Exercise, Manual, and Modality Logs for complete treatment.       Assessment/Plan Pt session consisted of therex, therapeutic activities and neuro re-ed as per flow sheet to assist with improved bilateral LE strength, reduce fall risk, and to ease difficulty with performing ADL's.   He required min/CGA assist with stepping over pods and tandem stance activities as well as rest breaks upon occasion due to fatigue. He tolerated session well overall and plan to cont with POC.        Progress strengthening /stabilization /functional activity             TIMED  Manual Therapy:         mins  73777;  Therapeutic Exercise:   32     mins  76040;     Neuromuscular Mojgan:  14    mins  26379;    Therapeutic Activity:     10     mins  45194;     Gait Training:           mins  64306;     Ultrasound:          mins  15948;    Attended e-stim                  mins  70516;  Iontophoresis                      mins  03474;    NON-TIMED  Electrical Stimulation:         mins  70555 ( );  Paraffin                               mins 88600                      Mechanical Traction           mins 00087  Dry Needling          mins self-pay    Timed Treatment:   56   mins   Total Treatment:     56  mins       Electronically signed by:      Tiffanie Song PT   License number:  [id number]      Referring MD:  Liyah Christianson DO  [address] [place] [address]

## 2023-03-08 ENCOUNTER — TREATMENT (OUTPATIENT)
Dept: PHYSICAL THERAPY | Facility: CLINIC | Age: 58
End: 2023-03-08
Payer: MEDICAID

## 2023-03-08 DIAGNOSIS — S98.912D AMPUTATION OF LEFT FOOT, SUBSEQUENT ENCOUNTER: ICD-10-CM

## 2023-03-08 DIAGNOSIS — R26.89 ANTALGIC GAIT: ICD-10-CM

## 2023-03-08 DIAGNOSIS — M00.061 STAPHYLOCOCCAL ARTHRITIS OF RIGHT KNEE: Primary | ICD-10-CM

## 2023-03-08 PROCEDURE — 97110 THERAPEUTIC EXERCISES: CPT | Performed by: PHYSICAL THERAPIST

## 2023-03-08 PROCEDURE — 97112 NEUROMUSCULAR REEDUCATION: CPT | Performed by: PHYSICAL THERAPIST

## 2023-03-08 NOTE — PROGRESS NOTES
Physical Therapy Daily Treatment Note      Patient: Melchor Hardwick III   : 1965  Referring practitioner: Liyah Hill DO  Date of Initial Visit: Type: THERAPY  Noted: 2023  Today's Date: 3/8/2023  Patient seen for 16 sessions       Visit Diagnoses:    ICD-10-CM ICD-9-CM   1. Staphylococcal arthritis of right knee ([facility])  M00.061 711.06     041.10   2. Amputation of left foot, subsequent encounter ([facility])  S98.912D V54.89     896.0   3. Antalgic gait  R26.89 781.2       Subjective Evaluation    History of Present Illness    Subjective comment: PT has no complaints today.       Objective   See Exercise, Manual, and Modality Logs for complete treatment.       Assessment & Plan     Assessment    Assessment details: Tx today consisted of exercises for improved hip and leg stability; followed by standing exercises and proprioceptive training to reduce falls and improved static balance.  Tx ended with mh to back for decreased back pain.  Pt responded well to tx today with good effort.    Plan  Plan details: Will follow progressing leg stability and improved balance.          Timed:         Manual Therapy:         mins  58409;     Therapeutic Exercise:    16     mins  24156;     Neuromuscular Mojgan:    14    mins  61372;    Therapeutic Activity:          mins  98609;     Gait Training:           mins  32272;     Ultrasound:          mins  39846;    Ionto                                   mins   03206  Self Care                            mins   61601  Canalith Repos         mins 25697      Un-Timed:  Electrical Stimulation:         mins  81423 ( );  Dry Needling          mins self-pay  Traction          mins 77603      Timed Treatment:   30   mins   Total Treatment:     38   Mins(8 min mh no charge)    Nir Song PT  [place] License: [id number]      Electronically signed by Nir Song PT, 23, 8:33 AM EST

## 2023-03-13 ENCOUNTER — TREATMENT (OUTPATIENT)
Dept: PHYSICAL THERAPY | Facility: CLINIC | Age: 58
End: 2023-03-13
Payer: MEDICAID

## 2023-03-13 DIAGNOSIS — M00.061 STAPHYLOCOCCAL ARTHRITIS OF RIGHT KNEE: Primary | ICD-10-CM

## 2023-03-13 DIAGNOSIS — R26.89 ANTALGIC GAIT: ICD-10-CM

## 2023-03-13 DIAGNOSIS — S98.912D AMPUTATION OF LEFT FOOT, SUBSEQUENT ENCOUNTER: ICD-10-CM

## 2023-03-13 PROCEDURE — 97110 THERAPEUTIC EXERCISES: CPT | Performed by: PHYSICAL THERAPIST

## 2023-03-13 PROCEDURE — 97112 NEUROMUSCULAR REEDUCATION: CPT | Performed by: PHYSICAL THERAPIST

## 2023-03-13 NOTE — PROGRESS NOTES
Physical Therapy Daily Treatment Note/Discharge      Patient: Melchor Hardwick III   : 1965  Referring practitioner: Liyah Hill DO  Date of Initial Visit: Type: THERAPY  Noted: 2023  Today's Date: 3/13/2023  Patient seen for 17 sessions       Visit Diagnoses:    ICD-10-CM ICD-9-CM   1. Staphylococcal arthritis of right knee ([facility])  M00.061 711.06     041.10   2. Amputation of left foot, subsequent encounter ([facility])  S98.912D V54.89     896.0   3. Antalgic gait  R26.89 781.2       Subjective Evaluation    History of Present Illness    Subjective comment: Pt reports being ready for discharge today.Pain  Current pain ratin  At best pain ratin  At worst pain ratin           Objective          Passive Range of Motion     Right Knee   Flexion: 104 degrees   Extension: Right knee passive extension: lacks 3 degrees.     Strength/Myotome Testing     Left Hip   Planes of Motion   Flexion: 4+    Right Hip   Planes of Motion   Flexion: 4+    Left Knee   Flexion: 4+  Extension: 4+    Right Knee   Flexion: 4+  Extension: 4+      See Exercise, Manual, and Modality Logs for complete treatment.       Assessment & Plan     Assessment    Assessment details: Pt is a 56 y/o male referred to therapy following a left partial foot amputation and right knee septic surgery. Pt has attended 17 sessions with good effort and results and has been instructed in a HEP.  Prognosis: good    Goals  Plan Goals: STG 4 weeks    1 Pt will be instructed in a HEP.met  2 Pt will demonstrate 4+/5 gross LE strength.met  3 Pt will report pain no greater than 5/10 with ADLs.met    LTG 8 weeks    1 Pt will demonstrate right knee ROM 3-110.partially met  2 Pt will report pain no greater than 3/10 with prolonged standing.not met  3 Pt will amb household distances with sc or lesser AD safely.met    Plan  Therapy options: will not be seen for skilled therapy services  Frequency: 1x week  Treatment plan discussed with: patient  Plan  details: Pt instructed in a HEP and met max gains.  Pt will be discharged at this time.            Timed:         Manual Therapy:        mins  28861;     Therapeutic Exercise:    31     mins  78894;     Neuromuscular Mojgan:    11    mins  31709;    Therapeutic Activity:          mins  37467;     Gait Training:           mins  60453;     Ultrasound:          mins  90472;    Ionto                                   mins   68147  Self Care                            mins   19438  Canalith Repos         mins 76910      Un-Timed:  Electrical Stimulation:         mins  91710 ( );  Dry Needling          mins self-pay  Traction          mins 36558      Timed Treatment:   42   mins   Total Treatment:     42   mins    Nir Song PT  [place] License: [id number]      Electronically signed by Nir Song PT, 03/13/23, 8:26 AM EDT

## 2023-03-16 ENCOUNTER — LAB (OUTPATIENT)
Dept: LAB | Facility: HOSPITAL | Age: 58
End: 2023-03-16
Payer: MEDICAID

## 2023-03-16 DIAGNOSIS — M00.061 STAPHYLOCOCCAL ARTHRITIS OF RIGHT KNEE: ICD-10-CM

## 2023-03-16 DIAGNOSIS — Z96.651 STATUS POST TOTAL RIGHT KNEE REPLACEMENT: ICD-10-CM

## 2023-03-16 DIAGNOSIS — K74.69 OTHER CIRRHOSIS OF LIVER: ICD-10-CM

## 2023-03-16 LAB
ALBUMIN SERPL-MCNC: 3.8 G/DL (ref 3.5–5.2)
ALBUMIN/GLOB SERPL: 1.1 G/DL
ALP SERPL-CCNC: 132 U/L (ref 39–117)
ALT SERPL W P-5'-P-CCNC: 22 U/L (ref 1–41)
ANION GAP SERPL CALCULATED.3IONS-SCNC: 8.9 MMOL/L (ref 5–15)
AST SERPL-CCNC: 42 U/L (ref 1–40)
BASOPHILS # BLD AUTO: 0.01 10*3/MM3 (ref 0–0.2)
BASOPHILS NFR BLD AUTO: 0.3 % (ref 0–1.5)
BILIRUB SERPL-MCNC: 0.8 MG/DL (ref 0–1.2)
BUN SERPL-MCNC: 13 MG/DL (ref 6–20)
BUN/CREAT SERPL: 14.3 (ref 7–25)
CALCIUM SPEC-SCNC: 9.5 MG/DL (ref 8.6–10.5)
CHLORIDE SERPL-SCNC: 102 MMOL/L (ref 98–107)
CO2 SERPL-SCNC: 26.1 MMOL/L (ref 22–29)
CREAT SERPL-MCNC: 0.91 MG/DL (ref 0.76–1.27)
CRP SERPL-MCNC: 0.58 MG/DL (ref 0–0.5)
DEPRECATED RDW RBC AUTO: 45.3 FL (ref 37–54)
EGFRCR SERPLBLD CKD-EPI 2021: 97.7 ML/MIN/1.73
EOSINOPHIL # BLD AUTO: 0.06 10*3/MM3 (ref 0–0.4)
EOSINOPHIL NFR BLD AUTO: 1.8 % (ref 0.3–6.2)
ERYTHROCYTE [DISTWIDTH] IN BLOOD BY AUTOMATED COUNT: 15.7 % (ref 12.3–15.4)
GLOBULIN UR ELPH-MCNC: 3.6 GM/DL
GLUCOSE SERPL-MCNC: 172 MG/DL (ref 65–99)
HCT VFR BLD AUTO: 39 % (ref 37.5–51)
HGB BLD-MCNC: 12.3 G/DL (ref 13–17.7)
LYMPHOCYTES # BLD AUTO: 0.85 10*3/MM3 (ref 0.7–3.1)
LYMPHOCYTES NFR BLD AUTO: 25.4 % (ref 19.6–45.3)
MCH RBC QN AUTO: 25.5 PG (ref 26.6–33)
MCHC RBC AUTO-ENTMCNC: 31.5 G/DL (ref 31.5–35.7)
MCV RBC AUTO: 80.7 FL (ref 79–97)
MONOCYTES # BLD AUTO: 0.26 10*3/MM3 (ref 0.1–0.9)
MONOCYTES NFR BLD AUTO: 7.8 % (ref 5–12)
NEUTROPHILS NFR BLD AUTO: 2.15 10*3/MM3 (ref 1.7–7)
NEUTROPHILS NFR BLD AUTO: 64.4 % (ref 42.7–76)
PLATELET # BLD AUTO: 62 10*3/MM3 (ref 140–450)
POTASSIUM SERPL-SCNC: 4.3 MMOL/L (ref 3.5–5.2)
PROT SERPL-MCNC: 7.4 G/DL (ref 6–8.5)
RBC # BLD AUTO: 4.83 10*6/MM3 (ref 4.14–5.8)
SODIUM SERPL-SCNC: 137 MMOL/L (ref 136–145)
WBC NRBC COR # BLD: 3.34 10*3/MM3 (ref 3.4–10.8)

## 2023-03-16 PROCEDURE — 85025 COMPLETE CBC W/AUTO DIFF WBC: CPT

## 2023-03-16 PROCEDURE — 86140 C-REACTIVE PROTEIN: CPT

## 2023-03-16 PROCEDURE — 36415 COLL VENOUS BLD VENIPUNCTURE: CPT

## 2023-03-16 PROCEDURE — 80053 COMPREHEN METABOLIC PANEL: CPT

## 2023-03-21 ENCOUNTER — OFFICE VISIT (OUTPATIENT)
Dept: INFECTIOUS DISEASES | Facility: CLINIC | Age: 58
End: 2023-03-21
Payer: MEDICAID

## 2023-03-21 VITALS
DIASTOLIC BLOOD PRESSURE: 77 MMHG | WEIGHT: 213.8 LBS | HEART RATE: 88 BPM | SYSTOLIC BLOOD PRESSURE: 146 MMHG | HEIGHT: 70 IN | TEMPERATURE: 98.1 F | BODY MASS INDEX: 30.61 KG/M2

## 2023-03-21 DIAGNOSIS — M00.061 STAPHYLOCOCCAL ARTHRITIS OF RIGHT KNEE: ICD-10-CM

## 2023-03-21 DIAGNOSIS — R78.81 MRSA BACTEREMIA: ICD-10-CM

## 2023-03-21 DIAGNOSIS — I07.9 ENDOCARDITIS OF TRICUSPID VALVE: Primary | ICD-10-CM

## 2023-03-21 DIAGNOSIS — B95.62 MRSA BACTEREMIA: ICD-10-CM

## 2023-03-21 PROCEDURE — 3078F DIAST BP <80 MM HG: CPT | Performed by: INTERNAL MEDICINE

## 2023-03-21 PROCEDURE — 3077F SYST BP >= 140 MM HG: CPT | Performed by: INTERNAL MEDICINE

## 2023-03-21 PROCEDURE — 1160F RVW MEDS BY RX/DR IN RCRD: CPT | Performed by: INTERNAL MEDICINE

## 2023-03-21 PROCEDURE — 1159F MED LIST DOCD IN RCRD: CPT | Performed by: INTERNAL MEDICINE

## 2023-03-21 PROCEDURE — 99214 OFFICE O/P EST MOD 30 MIN: CPT | Performed by: INTERNAL MEDICINE

## 2023-03-21 NOTE — PROGRESS NOTES
[facility]         Referring Provider: Missy Up APRN  [address] [place] [address]    Subjective      Chief Complaint  Follow-up    History of Present Illness  Melchor Hardwick III is a 58 y.o. male who presents today to [facility] for Follow Up . Past medical history is significant for right knee septic arthritis, MRSA bacteremia and lab follow up.    Past Medical History:   Diagnosis Date   • Diabetes mellitus (HCC)     4 times per day gets checked for sugar at rehab   • Dizzy    • Hyperlipidemia    • Hypertension    • Hypothyroid 11/30/2022   • MRSA infection     blood -   2022   • Orthostatic hypotension    • Pressure sore on buttocks     top crack -  sees wound - but now rn take care of it at rehab - minor opening - dime size - pat nurse didnt assess-  pt states getting better   • Pyogenic arthritis of right knee joint, due to unspecified organism (HCC) 09/21/2022   • Sepsis (HCC)    • Wears glasses     readers       Past Surgical History:   Procedure Laterality Date   • ABSCESS DRAINAGE  2004    PERINEUM   • AMPUTATION FOOT Left 05/18/2022    Procedure: AMPUTATION FOOT;  Surgeon: Addison Colby MD;  Location: [facility] OR;  Service: Podiatry;  Laterality: Left;   • INCISION AND DRAINAGE LEG Left 05/10/2022    Procedure: INCISION AND DRAINAGE LOWER EXTREMITY;  Surgeon: Addison Colby MD;  Location: [facility] OR;  Service: Podiatry;  Laterality: Left;   • KNEE INCISION AND DRAINAGE Right 09/21/2022    Procedure: KNEE INCISION AND DRAINAGE RIGHT;  Surgeon: Brain Bentley MD;  Location:  [facility] OR;  Service: Orthopedics;  Laterality: Right;   • PERIPHERALLY INSERTED CENTRAL CATHETER INSERTION Left    • TOTAL KNEE  PROSTHESIS REMOVAL W/ SPACER INSERTION Right 11/30/2022    Procedure: ANTIBIOTIC SPACER PLACEMENT KNEE - RIGHT;  Surgeon: Brain Bentley MD;  Location:  [facility] OR;  Service: Orthopedics;  Laterality: Right;   • WOUND DEBRIDEMENT Left  05/13/2022    Procedure: DEBRIDEMENT FOOT;  Surgeon: Addison Colby MD;  [facility];  Service: Podiatry;  Laterality: Left;       Social History     Socioeconomic History   • Marital status: Single   Tobacco Use   • Smoking status: Never   • Smokeless tobacco: Never   Vaping Use   • Vaping Use: Never used   Substance and Sexual Activity   • Alcohol use: Never   • Drug use: Never   • Sexual activity: Defer       Family History  family history is not on file.      There is no immunization history on file for this patient.     Allergies  No Known Allergies    The medication list has been reviewed and updated.   Current Medications    Current Outpatient Medications:   •  acetaminophen (TYLENOL) 325 MG tablet, Take 2 tablets by mouth Every 4 (Four) Hours As Needed for Mild Pain., Disp: , Rfl:   •  ascorbic acid (VITAMIN C) 500 MG tablet, Take 1 tablet by mouth Daily., Disp: , Rfl:   •  Diclofenac Sodium (VOLTAREN) 1 % gel gel, Apply 4 g topically to the appropriate area as directed 4 (Four) Times a Day As Needed., Disp: , Rfl:   •  doxycycline (VIBRAMYICN) 100 MG tablet, Take 1 tablet by mouth 2 (Two) Times a Day., Disp: , Rfl:   •  Insulin Aspart (novoLOG) 100 UNIT/ML injection, Inject 0-14 Units under the skin into the appropriate area as directed 3 (Three) Times a Day Before Meals., Disp: , Rfl: 12  •  Insulin Aspart (novoLOG) 100 UNIT/ML injection, Inject 4 Units under the skin into the appropriate area as directed 3 (Three) Times a Day Before Meals., Disp: , Rfl: 12  •  insulin detemir (LEVEMIR) 100 UNIT/ML injection, Inject 17 Units under the skin into the appropriate area as directed Every 12 (Twelve) Hours., Disp: , Rfl: 12  •  levothyroxine (SYNTHROID, LEVOTHROID) 50 MCG tablet, Take 1 tablet by mouth Daily., Disp: , Rfl:   •  multivitamin with minerals tablet tablet, Take 1 tablet by mouth Daily., Disp: , Rfl:   •  pantoprazole (PROTONIX) 40 MG EC tablet, Take 1 tablet by mouth Every Morning., Disp:  , Rfl:   •  pregabalin (LYRICA) 100 MG capsule, Take 1 capsule by mouth Every 12 (Twelve) Hours. (Patient taking differently: Take 1 capsule by mouth Every 12 (Twelve) Hours. Pt states he is taking 25 mg), Disp: , Rfl:   •  Vitamins A & D (magic barrier cream), Apply 1 application topically to the appropriate area as directed As Needed (skin irritation)., Disp: , Rfl:   •  bisacodyl (DULCOLAX) 10 MG suppository, Insert 1 suppository into the rectum Daily As Needed for Constipation. (Patient not taking: Reported on 3/21/2023), Disp: , Rfl:   •  cyclobenzaprine (FLEXERIL) 5 MG tablet, Take 1 tablet by mouth 3 (Three) Times a Day As Needed for Muscle Spasms. (Patient not taking: Reported on 3/21/2023), Disp: , Rfl:   •  docusate sodium 100 MG capsule, Take 1 capsule by mouth 2 (Two) Times a Day. (Patient not taking: Reported on 3/21/2023), Disp: , Rfl:   •  fludrocortisone 0.1 MG tablet, Take 0.5 tablets by mouth Daily. (Patient not taking: Reported on 3/21/2023), Disp: , Rfl:   •  meloxicam (MOBIC) 15 MG tablet, Take 1 tablet by mouth Daily. (Patient not taking: Reported on 3/21/2023), Disp: , Rfl:   •  midodrine (PROAMATINE) 2.5 MG tablet, Take 1 tablet by mouth 3 (Three) Times a Day Before Meals. Hold if SBP is above 120 (Patient not taking: Reported on 3/21/2023), Disp: , Rfl:   •  polyethylene glycol (MIRALAX) 17 g packet, Take 17 g by mouth Daily. (Patient not taking: Reported on 3/21/2023), Disp: , Rfl:       Review of Systems    Review of Systems   Constitutional: Negative for activity change, appetite change, chills, diaphoresis and fever.   HENT: Negative for congestion, dental problem, drooling, mouth sores, sinus pressure and sneezing.    Eyes: Negative for blurred vision, double vision, photophobia and discharge.   Respiratory: Negative for apnea, cough, choking, chest tightness, shortness of breath and wheezing.    Cardiovascular: Negative for chest pain, palpitations and leg swelling.   Gastrointestinal:  "Negative for abdominal distention, abdominal pain, diarrhea, nausea and vomiting.   Genitourinary: Negative for dysuria, flank pain and frequency.   Musculoskeletal: Negative for arthralgias, gait problem, neck pain and neck stiffness.   Skin: Negative for rash.   Neurological: Negative for dizziness, tremors, seizures, syncope, speech difficulty, numbness, headache and confusion.   Psychiatric/Behavioral: Negative for agitation, behavioral problems, hallucinations and suicidal ideas.        Objective     Vital Signs:  /77 (BP Location: Right arm, Patient Position: Sitting, Cuff Size: Adult)   Pulse 88   Temp 98.1 °F (36.7 °C) (Temporal)   Ht 177.8 cm (70\")   Wt 97 kg (213 lb 12.8 oz)   BMI 30.68 kg/m²   Estimated body mass index is 30.68 kg/m² as calculated from the following:    Height as of this encounter: 177.8 cm (70\").    Weight as of this encounter: 97 kg (213 lb 12.8 oz).    Physical Exam  Constitutional:       General: He is not in acute distress.     Appearance: Normal appearance. He is normal weight. He is not ill-appearing, toxic-appearing or diaphoretic.   HENT:      Head: Normocephalic and atraumatic.      Nose: Nose normal. No congestion or rhinorrhea.      Mouth/Throat:      Mouth: Mucous membranes are moist.      Pharynx: Oropharynx is clear.   Eyes:      General: No scleral icterus.     Extraocular Movements: Extraocular movements intact.      Pupils: Pupils are equal, round, and reactive to light.   Neck:      Vascular: No carotid bruit.   Cardiovascular:      Rate and Rhythm: Normal rate and regular rhythm.      Pulses: Normal pulses.      Heart sounds: No murmur heard.  Pulmonary:      Effort: Pulmonary effort is normal. No respiratory distress.      Breath sounds: Normal breath sounds. No stridor. No wheezing, rhonchi or rales.   Chest:      Chest wall: No tenderness.   Abdominal:      General: Abdomen is flat. Bowel sounds are normal. There is no distension.      Palpations: Abdomen " is soft.      Tenderness: There is no abdominal tenderness. There is no guarding or rebound.   Musculoskeletal:      Cervical back: Normal range of motion and neck supple. No rigidity or tenderness.      Comments: Right knee looks great with no erythema, no warmth and only minimal decreased ROM.   Lymphadenopathy:      Cervical: No cervical adenopathy.   Skin:     Coloration: Skin is not jaundiced.      Findings: No lesion or rash.   Neurological:      General: No focal deficit present.      Mental Status: He is alert and oriented to person, place, and time.   Psychiatric:         Mood and Affect: Mood normal.         Behavior: Behavior normal.          Result Review :  The following data was reviewed by Marilynn Giordano MD     Lab Results  Lab Results   Component Value Date    WBC 3.34 (L) 03/16/2023    HGB 12.3 (L) 03/16/2023    HCT 39.0 03/16/2023    MCV 80.7 03/16/2023    PLT 62 (L) 03/16/2023     Lab Results   Component Value Date    GLUCOSE 172 (H) 03/16/2023    BUN 13 03/16/2023    CREATININE 0.91 03/16/2023    BCR 14.3 03/16/2023    K 4.3 03/16/2023    CO2 26.1 03/16/2023    CALCIUM 9.5 03/16/2023    ALBUMIN 3.8 03/16/2023    AST 42 (H) 03/16/2023    ALT 22 03/16/2023      Lab Results   Component Value Date    CRP 0.58 (H) 03/16/2023        No results found for: ACANTHNAEG, AFBCX, BPERTUSSISCX, BLOODCX  No results found for: BCIDPCR, CXREFLEX, CSFCX, CULTURETIS  No results found for: CULTURES, HSVCX, URCX  No results found for: EYECULTURE, GCCX, HSVCULTURE, LABHSV  No results found for: LEGIONELLA, MRSACX, MUMPSCX, MYCOPLASCX  No results found for: NOCARDIACX, STOOLCX  No results found for: THROATCX, UNSTIMCULT, URINECX, CULTURE, VZVCULTUR  No results found for: VIRALCULTU, WOUNDCX    Radiology Results              Assessment / Plan        Diagnoses and all orders for this visit:    1. Endocarditis of tricuspid valve (Primary)  -     CBC & Differential; Future  -     Comprehensive Metabolic Panel;  Future  -     C-reactive Protein; Future    2. MRSA bacteremia  -     CBC & Differential; Future  -     Comprehensive Metabolic Panel; Future  -     C-reactive Protein; Future    3. Staphylococcal arthritis of right knee (HCC)  -     CBC & Differential; Future  -     Comprehensive Metabolic Panel; Future  -     C-reactive Protein; Future    CRP level significantly improved but not normal yet. WBC stable and liver enzymes improved. Persistent thrombocytopenia.    Would consider hematology and hepatology referrals if ok with PCP Missy Up as it was recommended by hematology back June 2022.    Knee is doing great but will continue with twice daily doxy for now until CRP has normalized.    Will follow up with blood work on 4/6/23 and follow up with me on 4/11/23.            Follow Up   Return in about 3 weeks (around 4/11/2023) for Follow up, With Dr. Giordano.    Visit Diagnoses:    ICD-10-CM ICD-9-CM   1. Endocarditis of tricuspid valve  I07.9 424.2   2. MRSA bacteremia  R78.81 790.7    B95.62 041.12   3. Staphylococcal arthritis of right knee (HCC)  M00.061 711.06     041.10       Patient was given instructions and counseling regarding his condition or for health maintenance advice. Please see specific information pulled into the AVS if appropriate.     This document has been electronically signed by Marilynn Giordano MD   March 21, 2023 10:08 EDT    Dictated Utilizing Dragon Dictation: Part of this note may be an electronic transcription/translation of spoken language to printed text using the Dragon Dictation System.

## 2023-04-06 ENCOUNTER — LAB (OUTPATIENT)
Dept: LAB | Facility: HOSPITAL | Age: 58
End: 2023-04-06
Payer: MEDICAID

## 2023-04-06 DIAGNOSIS — M00.061 STAPHYLOCOCCAL ARTHRITIS OF RIGHT KNEE: ICD-10-CM

## 2023-04-06 DIAGNOSIS — R78.81 MRSA BACTEREMIA: ICD-10-CM

## 2023-04-06 DIAGNOSIS — I07.9 ENDOCARDITIS OF TRICUSPID VALVE: ICD-10-CM

## 2023-04-06 DIAGNOSIS — B95.62 MRSA BACTEREMIA: ICD-10-CM

## 2023-04-06 LAB
ALBUMIN SERPL-MCNC: 3.5 G/DL (ref 3.5–5.2)
ALBUMIN/GLOB SERPL: 1.1 G/DL
ALP SERPL-CCNC: 143 U/L (ref 39–117)
ALT SERPL W P-5'-P-CCNC: 25 U/L (ref 1–41)
ANION GAP SERPL CALCULATED.3IONS-SCNC: 7.2 MMOL/L (ref 5–15)
AST SERPL-CCNC: 40 U/L (ref 1–40)
BASOPHILS # BLD AUTO: 0 10*3/MM3 (ref 0–0.2)
BASOPHILS NFR BLD AUTO: 0 % (ref 0–1.5)
BILIRUB SERPL-MCNC: 0.6 MG/DL (ref 0–1.2)
BUN SERPL-MCNC: 19 MG/DL (ref 6–20)
BUN/CREAT SERPL: 18.4 (ref 7–25)
CALCIUM SPEC-SCNC: 9.1 MG/DL (ref 8.6–10.5)
CHLORIDE SERPL-SCNC: 108 MMOL/L (ref 98–107)
CO2 SERPL-SCNC: 25.8 MMOL/L (ref 22–29)
CREAT SERPL-MCNC: 1.03 MG/DL (ref 0.76–1.27)
CRP SERPL-MCNC: 0.47 MG/DL (ref 0–0.5)
DEPRECATED RDW RBC AUTO: 60.2 FL (ref 37–54)
EGFRCR SERPLBLD CKD-EPI 2021: 84.2 ML/MIN/1.73
EOSINOPHIL # BLD AUTO: 0.07 10*3/MM3 (ref 0–0.4)
EOSINOPHIL NFR BLD AUTO: 1.9 % (ref 0.3–6.2)
ERYTHROCYTE [DISTWIDTH] IN BLOOD BY AUTOMATED COUNT: 20.3 % (ref 12.3–15.4)
GLOBULIN UR ELPH-MCNC: 3.1 GM/DL
GLUCOSE SERPL-MCNC: 100 MG/DL (ref 65–99)
HCT VFR BLD AUTO: 37.5 % (ref 37.5–51)
HGB BLD-MCNC: 11.9 G/DL (ref 13–17.7)
IMM GRANULOCYTES # BLD AUTO: 0.01 10*3/MM3 (ref 0–0.05)
IMM GRANULOCYTES NFR BLD AUTO: 0.3 % (ref 0–0.5)
LYMPHOCYTES # BLD AUTO: 0.74 10*3/MM3 (ref 0.7–3.1)
LYMPHOCYTES NFR BLD AUTO: 19.8 % (ref 19.6–45.3)
MCH RBC QN AUTO: 26 PG (ref 26.6–33)
MCHC RBC AUTO-ENTMCNC: 31.7 G/DL (ref 31.5–35.7)
MCV RBC AUTO: 82.1 FL (ref 79–97)
MONOCYTES # BLD AUTO: 0.31 10*3/MM3 (ref 0.1–0.9)
MONOCYTES NFR BLD AUTO: 8.3 % (ref 5–12)
NEUTROPHILS NFR BLD AUTO: 2.61 10*3/MM3 (ref 1.7–7)
NEUTROPHILS NFR BLD AUTO: 69.7 % (ref 42.7–76)
NRBC BLD AUTO-RTO: 0 /100 WBC (ref 0–0.2)
PLATELET # BLD AUTO: 71 10*3/MM3 (ref 140–450)
PMV BLD AUTO: 11.2 FL (ref 6–12)
POTASSIUM SERPL-SCNC: 4.2 MMOL/L (ref 3.5–5.2)
PROT SERPL-MCNC: 6.6 G/DL (ref 6–8.5)
RBC # BLD AUTO: 4.57 10*6/MM3 (ref 4.14–5.8)
SODIUM SERPL-SCNC: 141 MMOL/L (ref 136–145)
WBC NRBC COR # BLD: 3.74 10*3/MM3 (ref 3.4–10.8)

## 2023-04-06 PROCEDURE — 86140 C-REACTIVE PROTEIN: CPT

## 2023-04-06 PROCEDURE — 85025 COMPLETE CBC W/AUTO DIFF WBC: CPT

## 2023-04-06 PROCEDURE — 80053 COMPREHEN METABOLIC PANEL: CPT

## 2023-04-06 PROCEDURE — 36415 COLL VENOUS BLD VENIPUNCTURE: CPT

## 2023-04-11 ENCOUNTER — OFFICE VISIT (OUTPATIENT)
Dept: INFECTIOUS DISEASES | Facility: CLINIC | Age: 58
End: 2023-04-11
Payer: MEDICAID

## 2023-04-11 VITALS
HEIGHT: 70 IN | SYSTOLIC BLOOD PRESSURE: 145 MMHG | BODY MASS INDEX: 32.73 KG/M2 | TEMPERATURE: 97.7 F | WEIGHT: 228.6 LBS | DIASTOLIC BLOOD PRESSURE: 78 MMHG

## 2023-04-11 DIAGNOSIS — Z96.651 STATUS POST TOTAL RIGHT KNEE REPLACEMENT: ICD-10-CM

## 2023-04-11 DIAGNOSIS — M00.061 STAPHYLOCOCCAL ARTHRITIS OF RIGHT KNEE: ICD-10-CM

## 2023-04-11 DIAGNOSIS — I07.9 ENDOCARDITIS OF TRICUSPID VALVE: Primary | ICD-10-CM

## 2023-04-11 PROCEDURE — 3077F SYST BP >= 140 MM HG: CPT | Performed by: INTERNAL MEDICINE

## 2023-04-11 PROCEDURE — 1160F RVW MEDS BY RX/DR IN RCRD: CPT | Performed by: INTERNAL MEDICINE

## 2023-04-11 PROCEDURE — 99214 OFFICE O/P EST MOD 30 MIN: CPT | Performed by: INTERNAL MEDICINE

## 2023-04-11 PROCEDURE — 3078F DIAST BP <80 MM HG: CPT | Performed by: INTERNAL MEDICINE

## 2023-04-11 PROCEDURE — 1159F MED LIST DOCD IN RCRD: CPT | Performed by: INTERNAL MEDICINE

## 2023-04-11 NOTE — PROGRESS NOTES
[facility]         Referring Provider: No referring provider defined for this encounter.    Subjective      Chief Complaint  Follow-up (MRSA)    History of Present Illness  Melchor Hardwick III is a 58 y.o. male who presents today to [facility] for Follow Up . 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    Past Medical History:   Diagnosis Date   • Diabetes mellitus     4 times per day gets checked for sugar at rehab   • Dizzy    • Hyperlipidemia    • Hypertension    • Hypothyroid 11/30/2022   • MRSA infection     blood -   2022   • Orthostatic hypotension    • Pressure sore on buttocks     top crack -  sees wound - but now rn take care of it at rehab - minor opening - dime size - pat nurse didnt assess-  pt states getting better   • Pyogenic arthritis of right knee joint, due to unspecified organism 09/21/2022   • Sepsis    • Wears glasses     readers       Past Surgical History:   Procedure Laterality Date   • ABSCESS DRAINAGE  2004    PERINEUM   • AMPUTATION FOOT Left 05/18/2022    Procedure: AMPUTATION FOOT;  Surgeon: Addison Colby MD;  Location: [facility] OR;  Service: Podiatry;  Laterality: Left;   • INCISION AND DRAINAGE LEG Left 05/10/2022    Procedure: INCISION AND DRAINAGE LOWER EXTREMITY;  Surgeon: Addison Colby MD;  Location:  [facility] OR;  Service: Podiatry;  Laterality: Left;   • KNEE INCISION AND DRAINAGE Right 09/21/2022    Procedure: KNEE INCISION AND DRAINAGE RIGHT;  Surgeon: Brain Bentley MD;  Location:  [facility] OR;  Service: Orthopedics;  Laterality: Right;   • PERIPHERALLY INSERTED CENTRAL CATHETER INSERTION Left    • TOTAL KNEE  PROSTHESIS REMOVAL W/ SPACER INSERTION Right 11/30/2022    Procedure: ANTIBIOTIC SPACER PLACEMENT KNEE - RIGHT;  Surgeon: Brain Bentley MD;  Location:  [facility] OR;  Service: Orthopedics;  Laterality: Right;   • WOUND DEBRIDEMENT  Left 05/13/2022    Procedure: DEBRIDEMENT FOOT;  Surgeon: Addison Colby MD;  Location: [facility];  Service: Podiatry;  Laterality: Left;       Social History     Socioeconomic History   • Marital status: Single   Tobacco Use   • Smoking status: Never   • Smokeless tobacco: Never   Vaping Use   • Vaping Use: Never used   Substance and Sexual Activity   • Alcohol use: Never   • Drug use: Never   • Sexual activity: Defer       Family History  family history is not on file.      There is no immunization history on file for this patient.     Allergies  No Known Allergies    The medication list has been reviewed and updated.   Current Medications    Current Outpatient Medications:   •  acetaminophen (TYLENOL) 325 MG tablet, Take 2 tablets by mouth Every 4 (Four) Hours As Needed for Mild Pain., Disp: , Rfl:   •  ascorbic acid (VITAMIN C) 500 MG tablet, Take 1 tablet by mouth Daily., Disp: , Rfl:   •  bisacodyl (DULCOLAX) 10 MG suppository, Insert 1 suppository into the rectum Daily As Needed for Constipation., Disp: , Rfl:   •  cyclobenzaprine (FLEXERIL) 5 MG tablet, Take 1 tablet by mouth 3 (Three) Times a Day As Needed for Muscle Spasms., Disp: , Rfl:   •  Diclofenac Sodium (VOLTAREN) 1 % gel gel, Apply 4 g topically to the appropriate area as directed 4 (Four) Times a Day As Needed., Disp: , Rfl:   •  docusate sodium 100 MG capsule, Take 1 capsule by mouth 2 (Two) Times a Day., Disp: , Rfl:   •  doxycycline (VIBRAMYICN) 100 MG tablet, Take 1 tablet by mouth 2 (Two) Times a Day., Disp: , Rfl:   •  fludrocortisone 0.1 MG tablet, Take 0.5 tablets by mouth Daily., Disp: , Rfl:   •  Insulin Aspart (novoLOG) 100 UNIT/ML injection, Inject 0-14 Units under the skin into the appropriate area as directed 3 (Three) Times a Day Before Meals., Disp: , Rfl: 12  •  Insulin Aspart (novoLOG) 100 UNIT/ML injection, Inject 4 Units under the skin into the appropriate area as directed 3 (Three) Times a Day Before Meals., Disp: , Rfl:  12  •  insulin detemir (LEVEMIR) 100 UNIT/ML injection, Inject 17 Units under the skin into the appropriate area as directed Every 12 (Twelve) Hours., Disp: , Rfl: 12  •  levothyroxine (SYNTHROID, LEVOTHROID) 50 MCG tablet, Take 1 tablet by mouth Daily., Disp: , Rfl:   •  meloxicam (MOBIC) 15 MG tablet, Take 1 tablet by mouth Daily., Disp: , Rfl:   •  midodrine (PROAMATINE) 2.5 MG tablet, Take 1 tablet by mouth 3 (Three) Times a Day Before Meals. Hold if SBP is above 120, Disp: , Rfl:   •  multivitamin with minerals tablet tablet, Take 1 tablet by mouth Daily., Disp: , Rfl:   •  pantoprazole (PROTONIX) 40 MG EC tablet, Take 1 tablet by mouth Every Morning., Disp: , Rfl:   •  polyethylene glycol (MIRALAX) 17 g packet, Take 17 g by mouth Daily., Disp: , Rfl:   •  pregabalin (LYRICA) 100 MG capsule, Take 1 capsule by mouth Every 12 (Twelve) Hours. (Patient taking differently: Take 1 capsule by mouth Every 12 (Twelve) Hours. Pt states he is taking 25 mg), Disp: , Rfl:   •  Vitamins A & D (magic barrier cream), Apply 1 application topically to the appropriate area as directed As Needed (skin irritation)., Disp: , Rfl:       Review of Systems    Review of Systems   Constitutional: Negative for activity change, appetite change, chills, diaphoresis and fever.   HENT: Negative for congestion, dental problem, drooling, mouth sores, sinus pressure and sneezing.    Eyes: Negative for blurred vision, double vision, photophobia and discharge.   Respiratory: Negative for apnea, cough, choking, chest tightness, shortness of breath and wheezing.    Cardiovascular: Negative for chest pain, palpitations and leg swelling.   Gastrointestinal: Negative for abdominal distention, abdominal pain, diarrhea, nausea and vomiting.   Genitourinary: Negative for dysuria, flank pain and frequency.   Musculoskeletal: Negative for arthralgias, gait problem, neck pain and neck stiffness.   Skin: Negative for rash.   Neurological: Negative for  "dizziness, tremors, seizures, syncope, speech difficulty, numbness, headache and confusion.   Psychiatric/Behavioral: Negative for agitation, behavioral problems, hallucinations and suicidal ideas.        Objective     Vital Signs:  /78 (BP Location: Right arm, Patient Position: Sitting, Cuff Size: Small Adult)   Temp 97.7 °F (36.5 °C) (Temporal)   Ht 177.8 cm (70\")   Wt 104 kg (228 lb 9.6 oz)   BMI 32.80 kg/m²   Estimated body mass index is 32.8 kg/m² as calculated from the following:    Height as of this encounter: 177.8 cm (70\").    Weight as of this encounter: 104 kg (228 lb 9.6 oz).    Physical Exam  Constitutional:       General: He is not in acute distress.     Appearance: Normal appearance. He is normal weight. He is not ill-appearing, toxic-appearing or diaphoretic.   HENT:      Head: Normocephalic and atraumatic.      Nose: Nose normal. No congestion or rhinorrhea.      Mouth/Throat:      Mouth: Mucous membranes are moist.      Pharynx: Oropharynx is clear.   Eyes:      General: No scleral icterus.     Extraocular Movements: Extraocular movements intact.      Pupils: Pupils are equal, round, and reactive to light.   Neck:      Vascular: No carotid bruit.   Cardiovascular:      Rate and Rhythm: Normal rate and regular rhythm.      Pulses: Normal pulses.      Heart sounds: No murmur heard.  Pulmonary:      Effort: Pulmonary effort is normal. No respiratory distress.      Breath sounds: Normal breath sounds. No stridor. No wheezing, rhonchi or rales.   Chest:      Chest wall: No tenderness.   Abdominal:      General: Abdomen is flat. Bowel sounds are normal. There is no distension.      Palpations: Abdomen is soft.      Tenderness: There is no abdominal tenderness. There is no guarding or rebound.   Musculoskeletal:      Cervical back: Normal range of motion and neck supple. No rigidity or tenderness.      Comments: Right knee looks great with no erythema, no warmth, no tenderness   Lymphadenopathy: "      Cervical: No cervical adenopathy.   Skin:     Coloration: Skin is not jaundiced.      Findings: No lesion or rash.   Neurological:      General: No focal deficit present.      Mental Status: He is alert and oriented to person, place, and time.   Psychiatric:         Mood and Affect: Mood normal.         Behavior: Behavior normal.          Result Review :  The following data was reviewed by Marilynn Giordano MD     Lab Results  Lab Results   Component Value Date    WBC 3.74 04/06/2023    HGB 11.9 (L) 04/06/2023    HCT 37.5 04/06/2023    MCV 82.1 04/06/2023    PLT 71 (L) 04/06/2023     Lab Results   Component Value Date    GLUCOSE 100 (H) 04/06/2023    BUN 19 04/06/2023    CREATININE 1.03 04/06/2023    BCR 18.4 04/06/2023    K 4.2 04/06/2023    CO2 25.8 04/06/2023    CALCIUM 9.1 04/06/2023    ALBUMIN 3.5 04/06/2023    AST 40 04/06/2023    ALT 25 04/06/2023      Lab Results   Component Value Date    CRP 0.47 04/06/2023        No results found for: ACANTHNAEG, AFBCX, BPERTUSSISCX, BLOODCX  No results found for: BCIDPCR, CXREFLEX, CSFCX, CULTURETIS  No results found for: CULTURES, HSVCX, URCX  No results found for: EYECULTURE, GCCX, HSVCULTURE, LABHSV  No results found for: LEGIONELLA, MRSACX, MUMPSCX, MYCOPLASCX  No results found for: NOCARDIACX, STOOLCX  No results found for: THROATCX, UNSTIMCULT, URINECX, CULTURE, VZVCULTUR  No results found for: VIRALCULTU, WOUNDCX    Radiology Results              Assessment / Plan        Diagnoses and all orders for this visit:    1. Endocarditis of tricuspid valve (Primary)  -     CBC & Differential; Future  -     Comprehensive Metabolic Panel; Future  -     C-reactive Protein; Future    2. Status post total right knee replacement  -     CBC & Differential; Future  -     Comprehensive Metabolic Panel; Future  -     C-reactive Protein; Future    3. Staphylococcal arthritis of right knee  -     CBC & Differential; Future  -     Comprehensive Metabolic Panel; Future  -      C-reactive Protein; Future      CRP level significantly improved and normalized. WBC stable and improving and liver enzymes improved. Persistent thrombocytopenia but some better.     Knee is doing great but will continue with twice daily doxy for now until June at which time we could change to daily.     Will follow up with blood work on 5/5/23 and follow up with me on 5/9/23.          Follow Up   Return in 4 weeks (on 5/9/2023) for Follow up, With Dr. Giordano.    Visit Diagnoses:    ICD-10-CM ICD-9-CM   1. Endocarditis of tricuspid valve  I07.9 424.2   2. Status post total right knee replacement  Z96.651 V43.65   3. Staphylococcal arthritis of right knee  M00.061 711.06     041.10       Patient was given instructions and counseling regarding his condition or for health maintenance advice. Please see specific information pulled into the AVS if appropriate.     This document has been electronically signed by Marilynn Giordano MD   April 11, 2023 10:14 EDT    Dictated Utilizing Dragon Dictation: Part of this note may be an electronic transcription/translation of spoken language to printed text using the Dragon Dictation System.
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
## 2023-05-04 ENCOUNTER — LAB (OUTPATIENT)
Dept: LAB | Facility: HOSPITAL | Age: 58
End: 2023-05-04
Payer: MEDICAID

## 2023-05-04 DIAGNOSIS — Z96.651 STATUS POST TOTAL RIGHT KNEE REPLACEMENT: ICD-10-CM

## 2023-05-04 DIAGNOSIS — M00.061 STAPHYLOCOCCAL ARTHRITIS OF RIGHT KNEE: ICD-10-CM

## 2023-05-04 DIAGNOSIS — I07.9 ENDOCARDITIS OF TRICUSPID VALVE: ICD-10-CM

## 2023-05-04 LAB
ALBUMIN SERPL-MCNC: 3.9 G/DL (ref 3.5–5.2)
ALBUMIN/GLOB SERPL: 1.4 G/DL
ALP SERPL-CCNC: 123 U/L (ref 39–117)
ALT SERPL W P-5'-P-CCNC: 21 U/L (ref 1–41)
ANION GAP SERPL CALCULATED.3IONS-SCNC: 9 MMOL/L (ref 5–15)
AST SERPL-CCNC: 34 U/L (ref 1–40)
BASOPHILS # BLD AUTO: 0.01 10*3/MM3 (ref 0–0.2)
BASOPHILS NFR BLD AUTO: 0.4 % (ref 0–1.5)
BILIRUB SERPL-MCNC: 0.7 MG/DL (ref 0–1.2)
BUN SERPL-MCNC: 17 MG/DL (ref 6–20)
BUN/CREAT SERPL: 18.7 (ref 7–25)
CALCIUM SPEC-SCNC: 8.8 MG/DL (ref 8.6–10.5)
CHLORIDE SERPL-SCNC: 104 MMOL/L (ref 98–107)
CO2 SERPL-SCNC: 26 MMOL/L (ref 22–29)
CREAT SERPL-MCNC: 0.91 MG/DL (ref 0.76–1.27)
CRP SERPL-MCNC: <0.3 MG/DL (ref 0–0.5)
DEPRECATED RDW RBC AUTO: 45.9 FL (ref 37–54)
EGFRCR SERPLBLD CKD-EPI 2021: 97.7 ML/MIN/1.73
EOSINOPHIL # BLD AUTO: 0.07 10*3/MM3 (ref 0–0.4)
EOSINOPHIL NFR BLD AUTO: 2.9 % (ref 0.3–6.2)
ERYTHROCYTE [DISTWIDTH] IN BLOOD BY AUTOMATED COUNT: 15.7 % (ref 12.3–15.4)
GLOBULIN UR ELPH-MCNC: 2.8 GM/DL
GLUCOSE SERPL-MCNC: 142 MG/DL (ref 65–99)
HCT VFR BLD AUTO: 35.3 % (ref 37.5–51)
HGB BLD-MCNC: 11.8 G/DL (ref 13–17.7)
LYMPHOCYTES # BLD AUTO: 0.65 10*3/MM3 (ref 0.7–3.1)
LYMPHOCYTES NFR BLD AUTO: 26.6 % (ref 19.6–45.3)
MCH RBC QN AUTO: 26.9 PG (ref 26.6–33)
MCHC RBC AUTO-ENTMCNC: 33.4 G/DL (ref 31.5–35.7)
MCV RBC AUTO: 80.4 FL (ref 79–97)
MONOCYTES # BLD AUTO: 0.2 10*3/MM3 (ref 0.1–0.9)
MONOCYTES NFR BLD AUTO: 8.2 % (ref 5–12)
NEUTROPHILS NFR BLD AUTO: 1.5 10*3/MM3 (ref 1.7–7)
NEUTROPHILS NFR BLD AUTO: 61.5 % (ref 42.7–76)
PLATELET # BLD AUTO: 54 10*3/MM3 (ref 140–450)
PMV BLD AUTO: 11.2 FL (ref 6–12)
POTASSIUM SERPL-SCNC: 4 MMOL/L (ref 3.5–5.2)
PROT SERPL-MCNC: 6.7 G/DL (ref 6–8.5)
RBC # BLD AUTO: 4.39 10*6/MM3 (ref 4.14–5.8)
SODIUM SERPL-SCNC: 139 MMOL/L (ref 136–145)
WBC NRBC COR # BLD: 2.44 10*3/MM3 (ref 3.4–10.8)

## 2023-05-04 PROCEDURE — 86140 C-REACTIVE PROTEIN: CPT

## 2023-05-04 PROCEDURE — 36415 COLL VENOUS BLD VENIPUNCTURE: CPT

## 2023-05-04 PROCEDURE — 80053 COMPREHEN METABOLIC PANEL: CPT

## 2023-05-04 PROCEDURE — 85025 COMPLETE CBC W/AUTO DIFF WBC: CPT

## 2023-05-09 ENCOUNTER — OFFICE VISIT (OUTPATIENT)
Dept: INFECTIOUS DISEASES | Facility: CLINIC | Age: 58
End: 2023-05-09
Payer: MEDICAID

## 2023-05-09 VITALS
HEART RATE: 84 BPM | BODY MASS INDEX: 32.93 KG/M2 | OXYGEN SATURATION: 100 % | TEMPERATURE: 98.6 F | WEIGHT: 230 LBS | DIASTOLIC BLOOD PRESSURE: 76 MMHG | SYSTOLIC BLOOD PRESSURE: 143 MMHG | HEIGHT: 70 IN

## 2023-05-09 DIAGNOSIS — R78.81 MRSA BACTEREMIA: ICD-10-CM

## 2023-05-09 DIAGNOSIS — I07.9 ENDOCARDITIS OF TRICUSPID VALVE: ICD-10-CM

## 2023-05-09 DIAGNOSIS — B95.62 MRSA BACTEREMIA: ICD-10-CM

## 2023-05-09 DIAGNOSIS — Z96.651 STATUS POST TOTAL RIGHT KNEE REPLACEMENT: Primary | ICD-10-CM

## 2023-05-09 NOTE — PROGRESS NOTES
[facility]         Referring Provider: Missy Up, ROSALINA  [address] [place] [address]    Subjective      Chief Complaint  Follow-up (Endocarditis of tricuspid /right knee)    History of Present Illness  Melchor Hardwick III is a 58 y.o. male who presents today to [facility] for Follow Up . 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 Denies diarrhea and last CRP level at less than 0.3.    Past Medical History:   Diagnosis Date   • Diabetes mellitus     4 times per day gets checked for sugar at rehab   • Dizzy    • Hyperlipidemia    • Hypertension    • Hypothyroid 11/30/2022   • MRSA infection     blood -   2022   • Orthostatic hypotension    • Pressure sore on buttocks     top crack -  sees wound - but now rn take care of it at rehab - minor opening - dime size - pat nurse didnt assess-  pt states getting better   • Pyogenic arthritis of right knee joint, due to unspecified organism 09/21/2022   • Sepsis    • Wears glasses     readers       Past Surgical History:   Procedure Laterality Date   • ABSCESS DRAINAGE  2004    PERINEUM   • AMPUTATION FOOT Left 05/18/2022    Procedure: AMPUTATION FOOT;  Surgeon: Addison Colby MD;  Location: [facility];  Service: Podiatry;  Laterality: Left;   • INCISION AND DRAINAGE LEG Left 05/10/2022    Procedure: INCISION AND DRAINAGE LOWER EXTREMITY;  Surgeon: Addison Colby MD;  Location: [facility];  Service: Podiatry;  Laterality: Left;   • KNEE INCISION AND DRAINAGE Right 09/21/2022    Procedure: KNEE INCISION AND DRAINAGE RIGHT;  Surgeon: Brain Bentley MD;  Location: [facility] OR;  Service: Orthopedics;  Laterality: Right;   • PERIPHERALLY INSERTED CENTRAL CATHETER INSERTION Left    • TOTAL KNEE  PROSTHESIS REMOVAL W/ SPACER INSERTION Right 11/30/2022    Procedure: ANTIBIOTIC SPACER PLACEMENT KNEE - RIGHT;  Surgeon: Brain Bentley MD;   Location:  [facility] OR;  Service: Orthopedics;  Laterality: Right;   • WOUND DEBRIDEMENT Left 05/13/2022    Procedure: DEBRIDEMENT FOOT;  Surgeon: Addison Colby MD;  Location:  [facility];  Service: Podiatry;  Laterality: Left;       Social History     Socioeconomic History   • Marital status: Single   Tobacco Use   • Smoking status: Never   • Smokeless tobacco: Never   Vaping Use   • Vaping Use: Never used   Substance and Sexual Activity   • Alcohol use: Never   • Drug use: Never   • Sexual activity: Defer       Family History  family history is not on file.      There is no immunization history on file for this patient.     Allergies  No Known Allergies    The medication list has been reviewed and updated.   Current Medications    Current Outpatient Medications:   •  acetaminophen (TYLENOL) 325 MG tablet, Take 2 tablets by mouth Every 4 (Four) Hours As Needed for Mild Pain., Disp: , Rfl:   •  ascorbic acid (VITAMIN C) 500 MG tablet, Take 1 tablet by mouth Daily., Disp: , Rfl:   •  bisacodyl (DULCOLAX) 10 MG suppository, Insert 1 suppository into the rectum Daily As Needed for Constipation., Disp: , Rfl:   •  cyclobenzaprine (FLEXERIL) 5 MG tablet, Take 1 tablet by mouth 3 (Three) Times a Day As Needed for Muscle Spasms., Disp: , Rfl:   •  Diclofenac Sodium (VOLTAREN) 1 % gel gel, Apply 4 g topically to the appropriate area as directed 4 (Four) Times a Day As Needed., Disp: , Rfl:   •  docusate sodium 100 MG capsule, Take 1 capsule by mouth 2 (Two) Times a Day., Disp: , Rfl:   •  doxycycline (VIBRAMYICN) 100 MG tablet, Take 1 tablet by mouth 2 (Two) Times a Day., Disp: , Rfl:   •  fludrocortisone 0.1 MG tablet, Take 0.5 tablets by mouth Daily., Disp: , Rfl:   •  Insulin Aspart (novoLOG) 100 UNIT/ML injection, Inject 0-14 Units under the skin into the appropriate area as directed 3 (Three) Times a Day Before Meals., Disp: , Rfl: 12  •  Insulin Aspart (novoLOG) 100 UNIT/ML injection, Inject 4 Units under the skin into  the appropriate area as directed 3 (Three) Times a Day Before Meals., Disp: , Rfl: 12  •  insulin detemir (LEVEMIR) 100 UNIT/ML injection, Inject 17 Units under the skin into the appropriate area as directed Every 12 (Twelve) Hours., Disp: , Rfl: 12  •  levothyroxine (SYNTHROID, LEVOTHROID) 50 MCG tablet, Take 1 tablet by mouth Daily., Disp: , Rfl:   •  meloxicam (MOBIC) 15 MG tablet, Take 1 tablet by mouth Daily., Disp: , Rfl:   •  midodrine (PROAMATINE) 2.5 MG tablet, Take 1 tablet by mouth 3 (Three) Times a Day Before Meals. Hold if SBP is above 120, Disp: , Rfl:   •  multivitamin with minerals tablet tablet, Take 1 tablet by mouth Daily., Disp: , Rfl:   •  pantoprazole (PROTONIX) 40 MG EC tablet, Take 1 tablet by mouth Every Morning., Disp: , Rfl:   •  polyethylene glycol (MIRALAX) 17 g packet, Take 17 g by mouth Daily., Disp: , Rfl:   •  pregabalin (LYRICA) 100 MG capsule, Take 1 capsule by mouth Every 12 (Twelve) Hours. (Patient taking differently: Take 1 capsule by mouth Every 12 (Twelve) Hours. Pt states he is taking 25 mg), Disp: , Rfl:   •  Vitamins A & D (magic barrier cream), Apply 1 application topically to the appropriate area as directed As Needed (skin irritation)., Disp: , Rfl:       Review of Systems    Review of Systems   Constitutional: Negative for activity change, appetite change, chills, diaphoresis and fever.   HENT: Negative for congestion, dental problem, drooling, mouth sores, sinus pressure and sneezing.    Eyes: Negative for blurred vision, double vision, photophobia and discharge.   Respiratory: Negative for apnea, cough, choking, chest tightness, shortness of breath and wheezing.    Cardiovascular: Negative for chest pain, palpitations and leg swelling.   Gastrointestinal: Negative for abdominal distention, abdominal pain, diarrhea, nausea and vomiting.   Genitourinary: Negative for dysuria, flank pain and frequency.   Musculoskeletal: Negative for arthralgias, gait problem, neck pain  "and neck stiffness.   Skin: Negative for rash.   Neurological: Negative for dizziness, tremors, seizures, syncope, speech difficulty, numbness, headache and confusion.   Psychiatric/Behavioral: Negative for agitation, behavioral problems, hallucinations and suicidal ideas.        Objective     Vital Signs:  /76 (BP Location: Right arm, Patient Position: Sitting, Cuff Size: Adult)   Pulse 84   Temp 98.6 °F (37 °C) (Temporal)   Ht 177.8 cm (70\")   Wt 104 kg (230 lb)   SpO2 100%   BMI 33.00 kg/m²   Estimated body mass index is 33 kg/m² as calculated from the following:    Height as of this encounter: 177.8 cm (70\").    Weight as of this encounter: 104 kg (230 lb).    Physical Exam  Constitutional:       General: He is not in acute distress.     Appearance: Normal appearance. He is normal weight. He is not ill-appearing, toxic-appearing or diaphoretic.   HENT:      Head: Normocephalic and atraumatic.      Nose: Nose normal. No congestion or rhinorrhea.      Mouth/Throat:      Mouth: Mucous membranes are moist.      Pharynx: Oropharynx is clear.   Eyes:      General: No scleral icterus.     Extraocular Movements: Extraocular movements intact.      Pupils: Pupils are equal, round, and reactive to light.   Neck:      Vascular: No carotid bruit.   Cardiovascular:      Rate and Rhythm: Normal rate and regular rhythm.      Pulses: Normal pulses.      Heart sounds: No murmur heard.  Pulmonary:      Effort: Pulmonary effort is normal. No respiratory distress.      Breath sounds: Normal breath sounds. No stridor. No wheezing, rhonchi or rales.   Chest:      Chest wall: No tenderness.   Abdominal:      General: Abdomen is flat. Bowel sounds are normal. There is no distension.      Palpations: Abdomen is soft.      Tenderness: There is no abdominal tenderness. There is no guarding or rebound.   Musculoskeletal:      Cervical back: Normal range of motion and neck supple. No rigidity or tenderness.      Comments: RLE " edema, chronic but overall improved. Knee looks great with no erythema, no warmth and no drainage.   Lymphadenopathy:      Cervical: No cervical adenopathy.   Skin:     Coloration: Skin is not jaundiced.      Findings: No lesion or rash.   Neurological:      General: No focal deficit present.      Mental Status: He is alert and oriented to person, place, and time.   Psychiatric:         Mood and Affect: Mood normal.         Behavior: Behavior normal.          Result Review :  The following data was reviewed by Marilynn Giordano MD     Lab Results  Lab Results   Component Value Date    WBC 2.44 (L) 05/04/2023    HGB 11.8 (L) 05/04/2023    HCT 35.3 (L) 05/04/2023    MCV 80.4 05/04/2023    PLT 54 (L) 05/04/2023     Lab Results   Component Value Date    GLUCOSE 142 (H) 05/04/2023    BUN 17 05/04/2023    CREATININE 0.91 05/04/2023    BCR 18.7 05/04/2023    K 4.0 05/04/2023    CO2 26.0 05/04/2023    CALCIUM 8.8 05/04/2023    ALBUMIN 3.9 05/04/2023    AST 34 05/04/2023    ALT 21 05/04/2023      Lab Results   Component Value Date    CRP <0.30 05/04/2023        No results found for: ACANTHNAEG, AFBCX, BPERTUSSISCX, BLOODCX  No results found for: BCIDPCR, CXREFLEX, CSFCX, CULTURETIS  No results found for: CULTURES, HSVCX, URCX  No results found for: EYECULTURE, GCCX, HSVCULTURE, LABHSV  No results found for: LEGIONELLA, MRSACX, MUMPSCX, MYCOPLASCX  No results found for: NOCARDIACX, STOOLCX  No results found for: THROATCX, UNSTIMCULT, URINECX, CULTURE, VZVCULTUR  No results found for: VIRALCULTU, WOUNDCX    Radiology Results              Assessment / Plan        Diagnoses and all orders for this visit:    1. Status post total right knee replacement (Primary)  -     CBC & Differential; Future  -     Comprehensive Metabolic Panel; Future  -     C-reactive Protein; Future    2. MRSA bacteremia  -     CBC & Differential; Future  -     Comprehensive Metabolic Panel; Future  -     C-reactive Protein; Future    3. Endocarditis of  tricuspid valve  -     CBC & Differential; Future  -     Comprehensive Metabolic Panel; Future  -     C-reactive Protein; Future      CRP level significantly improved and normalized at less than 0.3. WBC stable and improving and liver enzymes improved. Persistent thrombocytopenia but some better.     Knee is doing great but will continue with twice daily doxy for now until June at which time we could change to daily.     Will follow up with blood work on 6/1/23 and follow up with me on 6/5/23.            Follow Up   Return in about 4 weeks (around 6/6/2023) for Follow up, With Dr. Giordano.    Visit Diagnoses:    ICD-10-CM ICD-9-CM   1. Status post total right knee replacement  Z96.651 V43.65   2. MRSA bacteremia  R78.81 790.7    B95.62 041.12   3. Endocarditis of tricuspid valve  I07.9 424.2       Patient was given instructions and counseling regarding his condition or for health maintenance advice. Please see specific information pulled into the AVS if appropriate.     This document has been electronically signed by Marilynn Giordano MD   May 9, 2023 10:13 EDT    Dictated Utilizing Dragon Dictation: Part of this note may be an electronic transcription/translation of spoken language to printed text using the Dragon Dictation System.

## 2023-06-01 ENCOUNTER — LAB (OUTPATIENT)
Dept: LAB | Facility: HOSPITAL | Age: 58
End: 2023-06-01
Payer: MEDICAID

## 2023-06-01 DIAGNOSIS — I07.9 ENDOCARDITIS OF TRICUSPID VALVE: ICD-10-CM

## 2023-06-01 DIAGNOSIS — R78.81 MRSA BACTEREMIA: ICD-10-CM

## 2023-06-01 DIAGNOSIS — B95.62 MRSA BACTEREMIA: ICD-10-CM

## 2023-06-01 DIAGNOSIS — Z96.651 STATUS POST TOTAL RIGHT KNEE REPLACEMENT: ICD-10-CM

## 2023-06-01 LAB
ALBUMIN SERPL-MCNC: 3.7 G/DL (ref 3.5–5.2)
ALBUMIN/GLOB SERPL: 1.4 G/DL
ALP SERPL-CCNC: 121 U/L (ref 39–117)
ALT SERPL W P-5'-P-CCNC: 24 U/L (ref 1–41)
ANION GAP SERPL CALCULATED.3IONS-SCNC: 9.9 MMOL/L (ref 5–15)
AST SERPL-CCNC: 41 U/L (ref 1–40)
BASOPHILS # BLD AUTO: 0.01 10*3/MM3 (ref 0–0.2)
BASOPHILS NFR BLD AUTO: 0.3 % (ref 0–1.5)
BILIRUB SERPL-MCNC: 0.6 MG/DL (ref 0–1.2)
BUN SERPL-MCNC: 12 MG/DL (ref 6–20)
BUN/CREAT SERPL: 12.8 (ref 7–25)
CALCIUM SPEC-SCNC: 8.9 MG/DL (ref 8.6–10.5)
CHLORIDE SERPL-SCNC: 109 MMOL/L (ref 98–107)
CO2 SERPL-SCNC: 21.1 MMOL/L (ref 22–29)
CREAT SERPL-MCNC: 0.94 MG/DL (ref 0.76–1.27)
CRP SERPL-MCNC: <0.3 MG/DL (ref 0–0.5)
DEPRECATED RDW RBC AUTO: 43.2 FL (ref 37–54)
EGFRCR SERPLBLD CKD-EPI 2021: 94 ML/MIN/1.73
EOSINOPHIL # BLD AUTO: 0.1 10*3/MM3 (ref 0–0.4)
EOSINOPHIL NFR BLD AUTO: 3.2 % (ref 0.3–6.2)
ERYTHROCYTE [DISTWIDTH] IN BLOOD BY AUTOMATED COUNT: 14.5 % (ref 12.3–15.4)
GLOBULIN UR ELPH-MCNC: 2.6 GM/DL
GLUCOSE SERPL-MCNC: 114 MG/DL (ref 65–99)
HCT VFR BLD AUTO: 35.6 % (ref 37.5–51)
HGB BLD-MCNC: 11.6 G/DL (ref 13–17.7)
LYMPHOCYTES # BLD AUTO: 0.65 10*3/MM3 (ref 0.7–3.1)
LYMPHOCYTES NFR BLD AUTO: 20.8 % (ref 19.6–45.3)
MCH RBC QN AUTO: 26.9 PG (ref 26.6–33)
MCHC RBC AUTO-ENTMCNC: 32.6 G/DL (ref 31.5–35.7)
MCV RBC AUTO: 82.6 FL (ref 79–97)
MONOCYTES # BLD AUTO: 0.26 10*3/MM3 (ref 0.1–0.9)
MONOCYTES NFR BLD AUTO: 8.3 % (ref 5–12)
NEUTROPHILS NFR BLD AUTO: 2.09 10*3/MM3 (ref 1.7–7)
NEUTROPHILS NFR BLD AUTO: 67.1 % (ref 42.7–76)
PLATELET # BLD AUTO: 57 10*3/MM3 (ref 140–450)
PMV BLD AUTO: 11.4 FL (ref 6–12)
POTASSIUM SERPL-SCNC: 4.2 MMOL/L (ref 3.5–5.2)
PROT SERPL-MCNC: 6.3 G/DL (ref 6–8.5)
RBC # BLD AUTO: 4.31 10*6/MM3 (ref 4.14–5.8)
SODIUM SERPL-SCNC: 140 MMOL/L (ref 136–145)
WBC NRBC COR # BLD: 3.12 10*3/MM3 (ref 3.4–10.8)

## 2023-06-01 PROCEDURE — 85025 COMPLETE CBC W/AUTO DIFF WBC: CPT

## 2023-06-01 PROCEDURE — 80053 COMPREHEN METABOLIC PANEL: CPT

## 2023-06-01 PROCEDURE — 86140 C-REACTIVE PROTEIN: CPT

## 2023-06-01 PROCEDURE — 36415 COLL VENOUS BLD VENIPUNCTURE: CPT

## 2023-06-06 ENCOUNTER — OFFICE VISIT (OUTPATIENT)
Dept: INFECTIOUS DISEASES | Facility: CLINIC | Age: 58
End: 2023-06-06
Payer: MEDICAID

## 2023-06-06 VITALS
OXYGEN SATURATION: 97 % | HEIGHT: 70 IN | WEIGHT: 234.8 LBS | BODY MASS INDEX: 33.61 KG/M2 | HEART RATE: 79 BPM | DIASTOLIC BLOOD PRESSURE: 73 MMHG | TEMPERATURE: 98.4 F | SYSTOLIC BLOOD PRESSURE: 153 MMHG

## 2023-06-06 DIAGNOSIS — Z96.651 STATUS POST TOTAL RIGHT KNEE REPLACEMENT: ICD-10-CM

## 2023-06-06 DIAGNOSIS — R78.81 MRSA BACTEREMIA: Primary | ICD-10-CM

## 2023-06-06 DIAGNOSIS — M00.9 INFECTION OF RIGHT KNEE: ICD-10-CM

## 2023-06-06 DIAGNOSIS — M00.061 STAPHYLOCOCCAL ARTHRITIS OF RIGHT KNEE: ICD-10-CM

## 2023-06-06 DIAGNOSIS — B95.62 MRSA BACTEREMIA: Primary | ICD-10-CM

## 2023-06-06 PROCEDURE — 3078F DIAST BP <80 MM HG: CPT | Performed by: INTERNAL MEDICINE

## 2023-06-06 PROCEDURE — 3077F SYST BP >= 140 MM HG: CPT | Performed by: INTERNAL MEDICINE

## 2023-06-06 PROCEDURE — 99214 OFFICE O/P EST MOD 30 MIN: CPT | Performed by: INTERNAL MEDICINE

## 2023-06-06 NOTE — PROGRESS NOTES
[facility]         Referring Provider: No referring provider defined for this encounter.    Subjective      Chief Complaint  Follow-up    History of Present Illness  Melchor Hardwick III is a 58 y.o. male who presents today to [facility] for Follow Up . Past medical history is significant for right knee septic on suppressive therapy.    Past Medical History:   Diagnosis Date    Diabetes mellitus     4 times per day gets checked for sugar at rehab    Dizzy     Hyperlipidemia     Hypertension     Hypothyroid 11/30/2022    MRSA infection     blood -   2022    Orthostatic hypotension     Pressure sore on buttocks     top crack -  sees wound - but now rn take care of it at rehab - minor opening - dime size - pat nurse didnt assess-  pt states getting better    Pyogenic arthritis of right knee joint, due to unspecified organism 09/21/2022    Sepsis     Wears glasses     readers       Past Surgical History:   Procedure Laterality Date    ABSCESS DRAINAGE  2004    PERINEUM    AMPUTATION FOOT Left 05/18/2022    Procedure: AMPUTATION FOOT;  Surgeon: Addison Colby MD;  Location: [facility] OR;  Service: Podiatry;  Laterality: Left;    INCISION AND DRAINAGE LEG Left 05/10/2022    Procedure: INCISION AND DRAINAGE LOWER EXTREMITY;  Surgeon: Addison Colby MD;  Location: [facility] OR;  Service: Podiatry;  Laterality: Left;    KNEE INCISION AND DRAINAGE Right 09/21/2022    Procedure: KNEE INCISION AND DRAINAGE RIGHT;  Surgeon: Brain Bentley MD;  Location:  [facility];  Service: Orthopedics;  Laterality: Right;    PERIPHERALLY INSERTED CENTRAL CATHETER INSERTION Left     TOTAL KNEE  PROSTHESIS REMOVAL W/ SPACER INSERTION Right 11/30/2022    Procedure: ANTIBIOTIC SPACER PLACEMENT KNEE - RIGHT;  Surgeon: Brain Bentley MD;  Location:  [facility];  Service: Orthopedics;  Laterality: Right;    WOUND DEBRIDEMENT Left 05/13/2022    Procedure: DEBRIDEMENT FOOT;  Surgeon: Addison Colby  MD [place];  Location: [facility];  Service: Podiatry;  Laterality: Left;       Social History     Socioeconomic History    Marital status: Single   Tobacco Use    Smoking status: Never    Smokeless tobacco: Never   Vaping Use    Vaping Use: Never used   Substance and Sexual Activity    Alcohol use: Never    Drug use: Never    Sexual activity: Defer       Family History  family history is not on file.      There is no immunization history on file for this patient.     Allergies  No Known Allergies    The medication list has been reviewed and updated.   Current Medications    Current Outpatient Medications:     acetaminophen (TYLENOL) 325 MG tablet, Take 2 tablets by mouth Every 4 (Four) Hours As Needed for Mild Pain., Disp: , Rfl:     ascorbic acid (VITAMIN C) 500 MG tablet, Take 1 tablet by mouth Daily., Disp: , Rfl:     bisacodyl (DULCOLAX) 10 MG suppository, Insert 1 suppository into the rectum Daily As Needed for Constipation., Disp: , Rfl:     cyclobenzaprine (FLEXERIL) 5 MG tablet, Take 1 tablet by mouth 3 (Three) Times a Day As Needed for Muscle Spasms., Disp: , Rfl:     Diclofenac Sodium (VOLTAREN) 1 % gel gel, Apply 4 g topically to the appropriate area as directed 4 (Four) Times a Day As Needed., Disp: , Rfl:     docusate sodium 100 MG capsule, Take 1 capsule by mouth 2 (Two) Times a Day., Disp: , Rfl:     doxycycline (VIBRAMYICN) 100 MG tablet, Take 1 tablet by mouth 2 (Two) Times a Day., Disp: , Rfl:     fludrocortisone 0.1 MG tablet, Take 0.5 tablets by mouth Daily., Disp: , Rfl:     Insulin Aspart (novoLOG) 100 UNIT/ML injection, Inject 0-14 Units under the skin into the appropriate area as directed 3 (Three) Times a Day Before Meals., Disp: , Rfl: 12    Insulin Aspart (novoLOG) 100 UNIT/ML injection, Inject 4 Units under the skin into the appropriate area as directed 3 (Three) Times a Day Before Meals., Disp: , Rfl: 12    insulin detemir (LEVEMIR) 100 UNIT/ML injection, Inject 17 Units under the skin into  the appropriate area as directed Every 12 (Twelve) Hours., Disp: , Rfl: 12    levothyroxine (SYNTHROID, LEVOTHROID) 50 MCG tablet, Take 1 tablet by mouth Daily., Disp: , Rfl:     meloxicam (MOBIC) 15 MG tablet, Take 1 tablet by mouth Daily., Disp: , Rfl:     midodrine (PROAMATINE) 2.5 MG tablet, Take 1 tablet by mouth 3 (Three) Times a Day Before Meals. Hold if SBP is above 120, Disp: , Rfl:     multivitamin with minerals tablet tablet, Take 1 tablet by mouth Daily., Disp: , Rfl:     pantoprazole (PROTONIX) 40 MG EC tablet, Take 1 tablet by mouth Every Morning., Disp: , Rfl:     polyethylene glycol (MIRALAX) 17 g packet, Take 17 g by mouth Daily., Disp: , Rfl:     pregabalin (LYRICA) 100 MG capsule, Take 1 capsule by mouth Every 12 (Twelve) Hours. (Patient taking differently: Take 1 capsule by mouth Every 12 (Twelve) Hours. Pt states he is taking 25 mg), Disp: , Rfl:     Vitamins A & D (magic barrier cream), Apply 1 application topically to the appropriate area as directed As Needed (skin irritation)., Disp: , Rfl:       Review of Systems    Review of Systems   Constitutional:  Negative for activity change, appetite change, chills, diaphoresis and fever.   HENT:  Negative for congestion, dental problem, drooling, mouth sores, sinus pressure and sneezing.    Eyes:  Negative for blurred vision, double vision, photophobia and discharge.   Respiratory:  Negative for apnea, cough, choking, chest tightness, shortness of breath and wheezing.    Cardiovascular:  Negative for chest pain, palpitations and leg swelling.   Gastrointestinal:  Negative for abdominal distention, abdominal pain, diarrhea, nausea and vomiting.   Genitourinary:  Negative for dysuria, flank pain and frequency.   Musculoskeletal:  Negative for arthralgias, gait problem, neck pain and neck stiffness.   Skin:  Negative for rash.   Neurological:  Negative for dizziness, tremors, seizures, syncope, speech difficulty, numbness, headache and confusion.  "  Psychiatric/Behavioral:  Negative for agitation, behavioral problems, hallucinations and suicidal ideas.       Objective     Vital Signs:  /73 (BP Location: Right arm, Patient Position: Sitting, Cuff Size: Adult)   Pulse 79   Temp 98.4 °F (36.9 °C) (Temporal)   Ht 177.8 cm (70\")   Wt 107 kg (234 lb 12.8 oz)   SpO2 97%   BMI 33.69 kg/m²   Estimated body mass index is 33.69 kg/m² as calculated from the following:    Height as of this encounter: 177.8 cm (70\").    Weight as of this encounter: 107 kg (234 lb 12.8 oz).    Physical Exam  Constitutional:       General: He is not in acute distress.     Appearance: Normal appearance. He is normal weight. He is not ill-appearing, toxic-appearing or diaphoretic.   HENT:      Head: Normocephalic and atraumatic.      Nose: Nose normal. No congestion or rhinorrhea.      Mouth/Throat:      Mouth: Mucous membranes are moist.      Pharynx: Oropharynx is clear.   Eyes:      General: No scleral icterus.     Extraocular Movements: Extraocular movements intact.      Pupils: Pupils are equal, round, and reactive to light.   Neck:      Vascular: No carotid bruit.   Cardiovascular:      Rate and Rhythm: Normal rate and regular rhythm.      Pulses: Normal pulses.      Heart sounds: No murmur heard.  Pulmonary:      Effort: Pulmonary effort is normal. No respiratory distress.      Breath sounds: Normal breath sounds. No stridor. No wheezing, rhonchi or rales.   Chest:      Chest wall: No tenderness.   Abdominal:      General: Abdomen is flat. Bowel sounds are normal. There is no distension.      Palpations: Abdomen is soft.      Tenderness: There is no abdominal tenderness. There is no guarding or rebound.   Musculoskeletal:      Cervical back: Normal range of motion and neck supple. No rigidity or tenderness.      Comments: Right knee looks great with no erythema, no warmth, and persistent swelling.   Lymphadenopathy:      Cervical: No cervical adenopathy.   Skin:     " Coloration: Skin is not jaundiced.      Findings: No lesion or rash.   Neurological:      General: No focal deficit present.      Mental Status: He is alert and oriented to person, place, and time.   Psychiatric:         Mood and Affect: Mood normal.         Behavior: Behavior normal.        Result Review :  The following data was reviewed by Marilynn Giordano MD     Lab Results  Lab Results   Component Value Date    WBC 3.12 (L) 06/01/2023    HGB 11.6 (L) 06/01/2023    HCT 35.6 (L) 06/01/2023    MCV 82.6 06/01/2023    PLT 57 (L) 06/01/2023     Lab Results   Component Value Date    GLUCOSE 114 (H) 06/01/2023    BUN 12 06/01/2023    CREATININE 0.94 06/01/2023    BCR 12.8 06/01/2023    K 4.2 06/01/2023    CO2 21.1 (L) 06/01/2023    CALCIUM 8.9 06/01/2023    ALBUMIN 3.7 06/01/2023    AST 41 (H) 06/01/2023    ALT 24 06/01/2023      Lab Results   Component Value Date    CRP <0.30 06/01/2023        No results found for: ACANTHNAEG, AFBCX, BPERTUSSISCX, BLOODCX  No results found for: BCIDPCR, CXREFLEX, CSFCX, CULTURETIS  No results found for: CULTURES, HSVCX, URCX  No results found for: EYECULTURE, GCCX, HSVCULTURE, LABHSV  No results found for: LEGIONELLA, MRSACX, MUMPSCX, MYCOPLASCX  No results found for: NOCARDIACX, STOOLCX  No results found for: THROATCX, UNSTIMCULT, URINECX, CULTURE, VZVCULTUR  No results found for: VIRALCULTU, WOUNDCX    Radiology Results              Assessment / Plan        Diagnoses and all orders for this visit:    1. MRSA bacteremia (Primary)    2. Infection of right knee    3. Status post total right knee replacement    4. Staphylococcal arthritis of right knee    CRP level significantly improved and remains normal at less than 0.3. WBC stable and improving and liver enzymes stable. Persistent thrombocytopenia stable.     Knee is doing great and will change Doxycycline to once daily suppressive therapy for now and follow up CRP level closely.     Will follow up with blood work on 7/7/23 and  follow up with me on 7/11/23.            Follow Up   No follow-ups on file.    Visit Diagnoses:    ICD-10-CM ICD-9-CM   1. MRSA bacteremia  R78.81 790.7    B95.62 041.12   2. Infection of right knee  M00.9 686.9   3. Status post total right knee replacement  Z96.651 V43.65   4. Staphylococcal arthritis of right knee  M00.061 711.06     041.10       Patient was given instructions and counseling regarding his condition or for health maintenance advice. Please see specific information pulled into the AVS if appropriate.     This document has been electronically signed by Marilynn Giordano MD   June 6, 2023 11:12 EDT    Dictated Utilizing Dragon Dictation: Part of this note may be an electronic transcription/translation of spoken language to printed text using the Dragon Dictation System.

## 2023-08-10 ENCOUNTER — LAB (OUTPATIENT)
Dept: LAB | Facility: HOSPITAL | Age: 58
End: 2023-08-10
Payer: MEDICAID

## 2023-08-10 DIAGNOSIS — I07.9 ENDOCARDITIS OF TRICUSPID VALVE: ICD-10-CM

## 2023-08-10 DIAGNOSIS — Z96.651 STATUS POST TOTAL RIGHT KNEE REPLACEMENT: ICD-10-CM

## 2023-08-10 DIAGNOSIS — B95.62 MRSA BACTEREMIA: ICD-10-CM

## 2023-08-10 DIAGNOSIS — M00.061 STAPHYLOCOCCAL ARTHRITIS OF RIGHT KNEE: ICD-10-CM

## 2023-08-10 DIAGNOSIS — R78.81 MRSA BACTEREMIA: ICD-10-CM

## 2023-08-10 LAB
ALBUMIN SERPL-MCNC: 3.6 G/DL (ref 3.5–5.2)
ALBUMIN/GLOB SERPL: 1.4 G/DL
ALP SERPL-CCNC: 150 U/L (ref 39–117)
ALT SERPL W P-5'-P-CCNC: 31 U/L (ref 1–41)
ANION GAP SERPL CALCULATED.3IONS-SCNC: 9 MMOL/L (ref 5–15)
AST SERPL-CCNC: 53 U/L (ref 1–40)
BASOPHILS # BLD AUTO: 0.01 10*3/MM3 (ref 0–0.2)
BASOPHILS NFR BLD AUTO: 0.4 % (ref 0–1.5)
BILIRUB SERPL-MCNC: 0.4 MG/DL (ref 0–1.2)
BUN SERPL-MCNC: 19 MG/DL (ref 6–20)
BUN/CREAT SERPL: 20.2 (ref 7–25)
CALCIUM SPEC-SCNC: 8.7 MG/DL (ref 8.6–10.5)
CHLORIDE SERPL-SCNC: 105 MMOL/L (ref 98–107)
CO2 SERPL-SCNC: 22 MMOL/L (ref 22–29)
CREAT SERPL-MCNC: 0.94 MG/DL (ref 0.76–1.27)
CRP SERPL-MCNC: 0.4 MG/DL (ref 0–0.5)
DEPRECATED RDW RBC AUTO: 43.7 FL (ref 37–54)
EGFRCR SERPLBLD CKD-EPI 2021: 94 ML/MIN/1.73
EOSINOPHIL # BLD AUTO: 0.07 10*3/MM3 (ref 0–0.4)
EOSINOPHIL NFR BLD AUTO: 2.9 % (ref 0.3–6.2)
ERYTHROCYTE [DISTWIDTH] IN BLOOD BY AUTOMATED COUNT: 14.5 % (ref 12.3–15.4)
GLOBULIN UR ELPH-MCNC: 2.6 GM/DL
GLUCOSE SERPL-MCNC: 173 MG/DL (ref 65–99)
HCT VFR BLD AUTO: 37.1 % (ref 37.5–51)
HGB BLD-MCNC: 12.2 G/DL (ref 13–17.7)
IMM GRANULOCYTES # BLD AUTO: 0 10*3/MM3 (ref 0–0.05)
IMM GRANULOCYTES NFR BLD AUTO: 0 % (ref 0–0.5)
LYMPHOCYTES # BLD AUTO: 0.64 10*3/MM3 (ref 0.7–3.1)
LYMPHOCYTES NFR BLD AUTO: 26.7 % (ref 19.6–45.3)
MCH RBC QN AUTO: 27.4 PG (ref 26.6–33)
MCHC RBC AUTO-ENTMCNC: 32.9 G/DL (ref 31.5–35.7)
MCV RBC AUTO: 83.2 FL (ref 79–97)
MONOCYTES # BLD AUTO: 0.22 10*3/MM3 (ref 0.1–0.9)
MONOCYTES NFR BLD AUTO: 9.2 % (ref 5–12)
NEUTROPHILS NFR BLD AUTO: 1.46 10*3/MM3 (ref 1.7–7)
NEUTROPHILS NFR BLD AUTO: 60.8 % (ref 42.7–76)
NRBC BLD AUTO-RTO: 0 /100 WBC (ref 0–0.2)
PLATELET # BLD AUTO: 63 10*3/MM3 (ref 140–450)
PMV BLD AUTO: 12.8 FL (ref 6–12)
POTASSIUM SERPL-SCNC: 4.5 MMOL/L (ref 3.5–5.2)
PROT SERPL-MCNC: 6.2 G/DL (ref 6–8.5)
RBC # BLD AUTO: 4.46 10*6/MM3 (ref 4.14–5.8)
SODIUM SERPL-SCNC: 136 MMOL/L (ref 136–145)
WBC NRBC COR # BLD: 2.4 10*3/MM3 (ref 3.4–10.8)

## 2023-08-10 PROCEDURE — 85025 COMPLETE CBC W/AUTO DIFF WBC: CPT

## 2023-08-10 PROCEDURE — 80053 COMPREHEN METABOLIC PANEL: CPT

## 2023-08-10 PROCEDURE — 36415 COLL VENOUS BLD VENIPUNCTURE: CPT

## 2023-08-10 PROCEDURE — 86140 C-REACTIVE PROTEIN: CPT

## 2023-08-15 ENCOUNTER — OFFICE VISIT (OUTPATIENT)
Dept: INFECTIOUS DISEASES | Facility: CLINIC | Age: 58
End: 2023-08-15
Payer: MEDICAID

## 2023-08-15 VITALS
HEIGHT: 70 IN | WEIGHT: 238.8 LBS | BODY MASS INDEX: 34.19 KG/M2 | SYSTOLIC BLOOD PRESSURE: 139 MMHG | TEMPERATURE: 97.8 F | OXYGEN SATURATION: 95 % | HEART RATE: 85 BPM | DIASTOLIC BLOOD PRESSURE: 75 MMHG

## 2023-08-15 DIAGNOSIS — Z96.651 STATUS POST TOTAL RIGHT KNEE REPLACEMENT: Primary | ICD-10-CM

## 2023-08-15 DIAGNOSIS — M00.061 STAPHYLOCOCCAL ARTHRITIS OF RIGHT KNEE: ICD-10-CM

## 2023-08-15 NOTE — PROGRESS NOTES
[facility]         Referring Provider: Missy Up APRN  [address] [place] [address]    Subjective      Chief Complaint  Follow-up (bacteremia)    History of Present Illness  Melchor Hardwick III is a 58 y.o. male who presents today to [facility] for Follow Up . 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 No fever, no chills and no night sweats.    Past Medical History:   Diagnosis Date    Diabetes mellitus     4 times per day gets checked for sugar at rehab    Dizzy     Hyperlipidemia     Hypertension     Hypothyroid 11/30/2022    MRSA infection     blood -   2022    Orthostatic hypotension     Pressure sore on buttocks     top crack -  sees wound - but now rn take care of it at rehab - minor opening - dime size - pat nurse didnt assess-  pt states getting better    Pyogenic arthritis of right knee joint, due to unspecified organism 09/21/2022    Sepsis     Wears glasses     readers       Past Surgical History:   Procedure Laterality Date    ABSCESS DRAINAGE  2004    PERINEUM    AMPUTATION FOOT Left 05/18/2022    Procedure: AMPUTATION FOOT;  Surgeon: Addison Colby MD;  Location: [facility] OR;  Service: Podiatry;  Laterality: Left;    INCISION AND DRAINAGE LEG Left 05/10/2022    Procedure: INCISION AND DRAINAGE LOWER EXTREMITY;  Surgeon: Addison Colby MD;  Location:  [facility] OR;  Service: Podiatry;  Laterality: Left;    KNEE INCISION AND DRAINAGE Right 09/21/2022    Procedure: KNEE INCISION AND DRAINAGE RIGHT;  Surgeon: Brain Bentley MD;  Location:  [facility] OR;  Service: Orthopedics;  Laterality: Right;    PERIPHERALLY INSERTED CENTRAL CATHETER INSERTION Left     TOTAL KNEE  PROSTHESIS REMOVAL W/ SPACER INSERTION Right 11/30/2022    Procedure: ANTIBIOTIC SPACER PLACEMENT KNEE - RIGHT;  Surgeon: Brain Bentley MD;  Location:  [facility] OR;  Service: Orthopedics;  Laterality: Right;    WOUND  DEBRIDEMENT Left 05/13/2022    Procedure: DEBRIDEMENT FOOT;  Surgeon: Addison Colby MD;  Location: [facility];  Service: Podiatry;  Laterality: Left;       Social History     Socioeconomic History    Marital status: Single   Tobacco Use    Smoking status: Never    Smokeless tobacco: Never   Vaping Use    Vaping Use: Never used   Substance and Sexual Activity    Alcohol use: Never    Drug use: Never    Sexual activity: Defer       Family History  family history is not on file.      There is no immunization history on file for this patient.     Allergies  No Known Allergies    The medication list has been reviewed and updated.   Current Medications    Current Outpatient Medications:     acetaminophen (TYLENOL) 325 MG tablet, Take 2 tablets by mouth Every 4 (Four) Hours As Needed for Mild Pain., Disp: , Rfl:     ascorbic acid (VITAMIN C) 500 MG tablet, Take 1 tablet by mouth Daily., Disp: , Rfl:     bisacodyl (DULCOLAX) 10 MG suppository, Insert 1 suppository into the rectum Daily As Needed for Constipation. (Patient not taking: Reported on 7/11/2023), Disp: , Rfl:     cyclobenzaprine (FLEXERIL) 5 MG tablet, Take 1 tablet by mouth 3 (Three) Times a Day As Needed for Muscle Spasms. (Patient not taking: Reported on 7/11/2023), Disp: , Rfl:     Diclofenac Sodium (VOLTAREN) 1 % gel gel, Apply 4 g topically to the appropriate area as directed 4 (Four) Times a Day As Needed., Disp: , Rfl:     docusate sodium 100 MG capsule, Take 1 capsule by mouth 2 (Two) Times a Day. (Patient not taking: Reported on 7/11/2023), Disp: , Rfl:     doxycycline (VIBRAMYICN) 100 MG tablet, Take 1 tablet by mouth Daily., Disp: , Rfl:     fludrocortisone 0.1 MG tablet, Take 0.5 tablets by mouth Daily., Disp: , Rfl:     Insulin Aspart (novoLOG) 100 UNIT/ML injection, Inject 0-14 Units under the skin into the appropriate area as directed 3 (Three) Times a Day Before Meals., Disp: , Rfl: 12    Insulin Aspart (novoLOG) 100 UNIT/ML injection, Inject 4  Units under the skin into the appropriate area as directed 3 (Three) Times a Day Before Meals., Disp: , Rfl: 12    insulin detemir (LEVEMIR) 100 UNIT/ML injection, Inject 17 Units under the skin into the appropriate area as directed Every 12 (Twelve) Hours., Disp: , Rfl: 12    levothyroxine (SYNTHROID, LEVOTHROID) 50 MCG tablet, Take 1 tablet by mouth Daily., Disp: , Rfl:     meloxicam (MOBIC) 15 MG tablet, Take 1 tablet by mouth Daily. (Patient not taking: Reported on 7/11/2023), Disp: , Rfl:     midodrine (PROAMATINE) 2.5 MG tablet, Take 1 tablet by mouth 3 (Three) Times a Day Before Meals. Hold if SBP is above 120 (Patient not taking: Reported on 7/11/2023), Disp: , Rfl:     multivitamin with minerals tablet tablet, Take 1 tablet by mouth Daily., Disp: , Rfl:     pantoprazole (PROTONIX) 40 MG EC tablet, Take 1 tablet by mouth Every Morning., Disp: , Rfl:     polyethylene glycol (MIRALAX) 17 g packet, Take 17 g by mouth Daily. (Patient not taking: Reported on 7/11/2023), Disp: , Rfl:     pregabalin (LYRICA) 100 MG capsule, Take 1 capsule by mouth Every 12 (Twelve) Hours. (Patient taking differently: Take 1 capsule by mouth Every 12 (Twelve) Hours. Pt states he is taking 25 mg), Disp: , Rfl:     Vitamins A & D (magic barrier cream), Apply 1 application topically to the appropriate area as directed As Needed (skin irritation). (Patient not taking: Reported on 7/11/2023), Disp: , Rfl:       Review of Systems    Review of Systems   Constitutional:  Negative for activity change, appetite change, chills, diaphoresis and fever.   HENT:  Negative for congestion, dental problem, drooling, mouth sores, sinus pressure and sneezing.    Eyes:  Negative for blurred vision, double vision, photophobia and discharge.   Respiratory:  Negative for apnea, cough, choking, chest tightness, shortness of breath and wheezing.    Cardiovascular:  Negative for chest pain, palpitations and leg swelling.   Gastrointestinal:  Negative for  "abdominal distention, abdominal pain, diarrhea, nausea and vomiting.   Genitourinary:  Negative for dysuria, flank pain and frequency.   Musculoskeletal:  Negative for arthralgias, gait problem, neck pain and neck stiffness.   Skin:  Negative for rash.   Neurological:  Negative for dizziness, tremors, seizures, syncope, speech difficulty, numbness, headache and confusion.   Psychiatric/Behavioral:  Negative for agitation, behavioral problems, hallucinations and suicidal ideas.       Objective     Vital Signs:  /75 (BP Location: Right arm, Patient Position: Sitting, Cuff Size: Small Adult)   Pulse 85   Temp 97.8 øF (36.6 øC) (Temporal)   Ht 177.8 cm (70\")   Wt 108 kg (238 lb 12.8 oz)   SpO2 95%   BMI 34.26 kg/mý   Estimated body mass index is 34.26 kg/mý as calculated from the following:    Height as of this encounter: 177.8 cm (70\").    Weight as of this encounter: 108 kg (238 lb 12.8 oz).    Physical Exam  Constitutional:       General: He is not in acute distress.     Appearance: Normal appearance. He is normal weight. He is not ill-appearing, toxic-appearing or diaphoretic.   HENT:      Head: Normocephalic and atraumatic.      Nose: Nose normal. No congestion or rhinorrhea.      Mouth/Throat:      Mouth: Mucous membranes are moist.      Pharynx: Oropharynx is clear.   Eyes:      General: No scleral icterus.     Extraocular Movements: Extraocular movements intact.      Pupils: Pupils are equal, round, and reactive to light.   Neck:      Vascular: No carotid bruit.   Cardiovascular:      Rate and Rhythm: Normal rate and regular rhythm.      Pulses: Normal pulses.      Heart sounds: No murmur heard.  Pulmonary:      Effort: Pulmonary effort is normal. No respiratory distress.      Breath sounds: Normal breath sounds. No stridor. No wheezing, rhonchi or rales.   Chest:      Chest wall: No tenderness.   Abdominal:      General: Abdomen is flat. Bowel sounds are normal. There is no distension.      " Palpations: Abdomen is soft.      Tenderness: There is no abdominal tenderness. There is no guarding or rebound.   Musculoskeletal:      Cervical back: Normal range of motion and neck supple. No rigidity or tenderness.      Comments: Right knee looks great, with no erythema, no warmth and no swelling   Lymphadenopathy:      Cervical: No cervical adenopathy.   Skin:     Coloration: Skin is not jaundiced.      Findings: No lesion or rash.   Neurological:      General: No focal deficit present.      Mental Status: He is alert and oriented to person, place, and time.   Psychiatric:         Mood and Affect: Mood normal.         Behavior: Behavior normal.        Result Review :  The following data was reviewed by Marilynn Giordano MD     Lab Results  Lab Results   Component Value Date    WBC 2.40 (L) 08/10/2023    HGB 12.2 (L) 08/10/2023    HCT 37.1 (L) 08/10/2023    MCV 83.2 08/10/2023    PLT 63 (L) 08/10/2023     Lab Results   Component Value Date    GLUCOSE 173 (H) 08/10/2023    BUN 19 08/10/2023    CREATININE 0.94 08/10/2023    BCR 20.2 08/10/2023    K 4.5 08/10/2023    CO2 22.0 08/10/2023    CALCIUM 8.7 08/10/2023    ALBUMIN 3.6 08/10/2023    AST 53 (H) 08/10/2023    ALT 31 08/10/2023      Lab Results   Component Value Date    CRP 0.40 08/10/2023        No results found for: ACANTHNAEG, AFBCX, BPERTUSSISCX, BLOODCX  No results found for: BCIDPCR, CXREFLEX, CSFCX, CULTURETIS  No results found for: CULTURES, HSVCX, URCX  No results found for: EYECULTURE, GCCX, HSVCULTURE, LABHSV  No results found for: LEGIONELLA, MRSACX, MUMPSCX, MYCOPLASCX  No results found for: NOCARDIACX, STOOLCX  No results found for: THROATCX, UNSTIMCULT, URINECX, CULTURE, VZVCULTUR  No results found for: VIRALCULTU, WOUNDCX    Radiology Results              Assessment / Plan        Diagnoses and all orders for this visit:    1. Status post total right knee replacement (Primary)    2. Staphylococcal arthritis of right knee        Labs stable  with slightly rising CRP level at 0.4 and stable WBC and thrombocytopenia.    I am concerned about rising CRP level but clinically patient is doing great with no complaints and no evidence of clinical sepsis. Will follow closely.     Continue with oral lifelong suppressive therapy with doxycycline 100 mg daily that was decreased from bid on 6/6/23.    If CRP continues to rise next month, will escalate dosing to bid instead of daily.     Will check blood work on 9/8/23 and follow up with me on 9/12/23.        Follow Up   Return in about 4 weeks (around 9/12/2023) for Follow up, With Dr. Giordano.    Visit Diagnoses:    ICD-10-CM ICD-9-CM   1. Status post total right knee replacement  Z96.651 V43.65   2. Staphylococcal arthritis of right knee  M00.061 711.06     041.10       Patient was given instructions and counseling regarding his condition or for health maintenance advice. Please see specific information pulled into the AVS if appropriate.     This document has been electronically signed by Marilynn Giordano MD   August 15, 2023 08:59 EDT    Dictated Utilizing Dragon Dictation: Part of this note may be an electronic transcription/translation of spoken language to printed text using the Dragon Dictation System.

## 2023-09-07 ENCOUNTER — LAB (OUTPATIENT)
Dept: LAB | Facility: HOSPITAL | Age: 58
End: 2023-09-07
Payer: MEDICAID

## 2023-09-07 LAB
ALBUMIN SERPL-MCNC: 3.8 G/DL (ref 3.5–5.2)
ALBUMIN/GLOB SERPL: 1.3 G/DL
ALP SERPL-CCNC: 94 U/L (ref 39–117)
ALT SERPL W P-5'-P-CCNC: 27 U/L (ref 1–41)
ANION GAP SERPL CALCULATED.3IONS-SCNC: 10.6 MMOL/L (ref 5–15)
AST SERPL-CCNC: 39 U/L (ref 1–40)
BASOPHILS # BLD AUTO: 0.01 10*3/MM3 (ref 0–0.2)
BASOPHILS NFR BLD AUTO: 0.3 % (ref 0–1.5)
BILIRUB SERPL-MCNC: 0.9 MG/DL (ref 0–1.2)
BUN SERPL-MCNC: 13 MG/DL (ref 6–20)
BUN/CREAT SERPL: 13.8 (ref 7–25)
CALCIUM SPEC-SCNC: 9 MG/DL (ref 8.6–10.5)
CHLORIDE SERPL-SCNC: 104 MMOL/L (ref 98–107)
CO2 SERPL-SCNC: 21.4 MMOL/L (ref 22–29)
CREAT SERPL-MCNC: 0.94 MG/DL (ref 0.76–1.27)
CRP SERPL-MCNC: 0.38 MG/DL (ref 0–0.5)
DEPRECATED RDW RBC AUTO: 44 FL (ref 37–54)
EGFRCR SERPLBLD CKD-EPI 2021: 94 ML/MIN/1.73
EOSINOPHIL # BLD AUTO: 0.1 10*3/MM3 (ref 0–0.4)
EOSINOPHIL NFR BLD AUTO: 3.4 % (ref 0.3–6.2)
ERYTHROCYTE [DISTWIDTH] IN BLOOD BY AUTOMATED COUNT: 14.7 % (ref 12.3–15.4)
GLOBULIN UR ELPH-MCNC: 3 GM/DL
GLUCOSE SERPL-MCNC: 162 MG/DL (ref 65–99)
HCT VFR BLD AUTO: 41.3 % (ref 37.5–51)
HGB BLD-MCNC: 13.7 G/DL (ref 13–17.7)
IMM GRANULOCYTES # BLD AUTO: 0.01 10*3/MM3 (ref 0–0.05)
IMM GRANULOCYTES NFR BLD AUTO: 0.3 % (ref 0–0.5)
LYMPHOCYTES # BLD AUTO: 0.62 10*3/MM3 (ref 0.7–3.1)
LYMPHOCYTES NFR BLD AUTO: 20.9 % (ref 19.6–45.3)
MCH RBC QN AUTO: 27.6 PG (ref 26.6–33)
MCHC RBC AUTO-ENTMCNC: 33.2 G/DL (ref 31.5–35.7)
MCV RBC AUTO: 83.1 FL (ref 79–97)
MONOCYTES # BLD AUTO: 0.29 10*3/MM3 (ref 0.1–0.9)
MONOCYTES NFR BLD AUTO: 9.8 % (ref 5–12)
NEUTROPHILS NFR BLD AUTO: 1.93 10*3/MM3 (ref 1.7–7)
NEUTROPHILS NFR BLD AUTO: 65.3 % (ref 42.7–76)
NRBC BLD AUTO-RTO: 0 /100 WBC (ref 0–0.2)
PLATELET # BLD AUTO: 66 10*3/MM3 (ref 140–450)
PMV BLD AUTO: 12.5 FL (ref 6–12)
POTASSIUM SERPL-SCNC: 4.4 MMOL/L (ref 3.5–5.2)
PROT SERPL-MCNC: 6.8 G/DL (ref 6–8.5)
RBC # BLD AUTO: 4.97 10*6/MM3 (ref 4.14–5.8)
SODIUM SERPL-SCNC: 136 MMOL/L (ref 136–145)
WBC NRBC COR # BLD: 2.96 10*3/MM3 (ref 3.4–10.8)

## 2023-09-07 PROCEDURE — 85025 COMPLETE CBC W/AUTO DIFF WBC: CPT | Performed by: INTERNAL MEDICINE

## 2023-09-07 PROCEDURE — 80053 COMPREHEN METABOLIC PANEL: CPT | Performed by: INTERNAL MEDICINE

## 2023-09-07 PROCEDURE — 86140 C-REACTIVE PROTEIN: CPT | Performed by: INTERNAL MEDICINE

## 2023-09-12 ENCOUNTER — OFFICE VISIT (OUTPATIENT)
Dept: INFECTIOUS DISEASES | Facility: CLINIC | Age: 58
End: 2023-09-12
Payer: MEDICAID

## 2023-09-12 VITALS
WEIGHT: 245.4 LBS | HEART RATE: 85 BPM | OXYGEN SATURATION: 98 % | SYSTOLIC BLOOD PRESSURE: 158 MMHG | BODY MASS INDEX: 35.13 KG/M2 | TEMPERATURE: 98 F | HEIGHT: 70 IN | DIASTOLIC BLOOD PRESSURE: 77 MMHG

## 2023-09-12 DIAGNOSIS — Z96.651 STATUS POST TOTAL RIGHT KNEE REPLACEMENT: ICD-10-CM

## 2023-09-12 DIAGNOSIS — I07.9 ENDOCARDITIS OF TRICUSPID VALVE: Primary | ICD-10-CM

## 2023-09-12 NOTE — PROGRESS NOTES
[facility]         Referring Provider: Missy Up APRN  [address] [place] [address]    Subjective      Chief Complaint  Follow-up (bacteremia)    History of Present Illness  Melchor Hardwick III is a 58 y.o. male who presents today to [facility] for Follow Up . 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 No fever, no chills and no night sweats. I reviewed labs from last week and CRP remains normal with normalized liver enzymes.    Past Medical History:   Diagnosis Date    Diabetes mellitus     4 times per day gets checked for sugar at rehab    Dizzy     Hyperlipidemia     Hypertension     Hypothyroid 11/30/2022    MRSA infection     blood -   2022    Orthostatic hypotension     Pressure sore on buttocks     top crack -  sees wound - but now rn take care of it at rehab - minor opening - dime size - pat nurse didnt assess-  pt states getting better    Pyogenic arthritis of right knee joint, due to unspecified organism 09/21/2022    Sepsis     Wears glasses     readers       Past Surgical History:   Procedure Laterality Date    ABSCESS DRAINAGE  2004    PERINEUM    AMPUTATION FOOT Left 05/18/2022    Procedure: AMPUTATION FOOT;  Surgeon: Addison Colby MD;  Location: [facility] OR;  Service: Podiatry;  Laterality: Left;    INCISION AND DRAINAGE LEG Left 05/10/2022    Procedure: INCISION AND DRAINAGE LOWER EXTREMITY;  Surgeon: Addison Colby MD;  Location: [facility] OR;  Service: Podiatry;  Laterality: Left;    KNEE INCISION AND DRAINAGE Right 09/21/2022    Procedure: KNEE INCISION AND DRAINAGE RIGHT;  Surgeon: Brain Bentley MD;  Location: [facility] OR;  Service: Orthopedics;  Laterality: Right;    PERIPHERALLY INSERTED CENTRAL CATHETER INSERTION Left     TOTAL KNEE  PROSTHESIS REMOVAL W/ SPACER INSERTION Right 11/30/2022    Procedure: ANTIBIOTIC SPACER PLACEMENT KNEE - RIGHT;  Surgeon: Mc  Brain VALDES MD;  Location:  [facility] OR;  Service: Orthopedics;  Laterality: Right;    WOUND DEBRIDEMENT Left 05/13/2022    Procedure: DEBRIDEMENT FOOT;  Surgeon: Addison Colby MD;  Location:  [facility] OR;  Service: Podiatry;  Laterality: Left;       Social History     Socioeconomic History    Marital status: Single   Tobacco Use    Smoking status: Never    Smokeless tobacco: Never   Vaping Use    Vaping Use: Never used   Substance and Sexual Activity    Alcohol use: Never    Drug use: Never    Sexual activity: Defer       Family History  family history is not on file.      There is no immunization history on file for this patient.     Allergies  No Known Allergies    The medication list has been reviewed and updated.   Current Medications    Current Outpatient Medications:     acetaminophen (TYLENOL) 325 MG tablet, Take 2 tablets by mouth Every 4 (Four) Hours As Needed for Mild Pain., Disp: , Rfl:     ascorbic acid (VITAMIN C) 500 MG tablet, Take 1 tablet by mouth Daily., Disp: , Rfl:     bisacodyl (DULCOLAX) 10 MG suppository, Insert 1 suppository into the rectum Daily As Needed for Constipation., Disp: , Rfl:     cyclobenzaprine (FLEXERIL) 5 MG tablet, Take 1 tablet by mouth 3 (Three) Times a Day As Needed for Muscle Spasms., Disp: , Rfl:     Diclofenac Sodium (VOLTAREN) 1 % gel gel, Apply 4 g topically to the appropriate area as directed 4 (Four) Times a Day As Needed., Disp: , Rfl:     docusate sodium 100 MG capsule, Take 1 capsule by mouth 2 (Two) Times a Day., Disp: , Rfl:     doxycycline (VIBRAMYICN) 100 MG tablet, Take 1 tablet by mouth Daily., Disp: , Rfl:     fludrocortisone 0.1 MG tablet, Take 0.5 tablets by mouth Daily., Disp: , Rfl:     Insulin Aspart (novoLOG) 100 UNIT/ML injection, Inject 0-14 Units under the skin into the appropriate area as directed 3 (Three) Times a Day Before Meals., Disp: , Rfl: 12    Insulin Aspart (novoLOG) 100 UNIT/ML injection, Inject 4 Units under the skin into the appropriate  area as directed 3 (Three) Times a Day Before Meals., Disp: , Rfl: 12    insulin detemir (LEVEMIR) 100 UNIT/ML injection, Inject 17 Units under the skin into the appropriate area as directed Every 12 (Twelve) Hours., Disp: , Rfl: 12    levothyroxine (SYNTHROID, LEVOTHROID) 50 MCG tablet, Take 1 tablet by mouth Daily., Disp: , Rfl:     multivitamin with minerals tablet tablet, Take 1 tablet by mouth Daily., Disp: , Rfl:     pantoprazole (PROTONIX) 40 MG EC tablet, Take 1 tablet by mouth Every Morning., Disp: , Rfl:     polyethylene glycol (MIRALAX) 17 g packet, Take 17 g by mouth Daily., Disp: , Rfl:     pregabalin (LYRICA) 100 MG capsule, Take 1 capsule by mouth Every 12 (Twelve) Hours. (Patient taking differently: Take 1 capsule by mouth Every 12 (Twelve) Hours. Pt states he is taking 25 mg), Disp: , Rfl:     Vitamins A & D (magic barrier cream), Apply 1 application topically to the appropriate area as directed As Needed (skin irritation)., Disp: , Rfl:     meloxicam (MOBIC) 15 MG tablet, Take 1 tablet by mouth Daily. (Patient not taking: Reported on 7/11/2023), Disp: , Rfl:     midodrine (PROAMATINE) 2.5 MG tablet, Take 1 tablet by mouth 3 (Three) Times a Day Before Meals. Hold if SBP is above 120 (Patient not taking: Reported on 7/11/2023), Disp: , Rfl:       Review of Systems    Review of Systems   Constitutional:  Negative for activity change, appetite change, chills, diaphoresis and fever.   HENT:  Negative for congestion, dental problem, drooling, mouth sores, sinus pressure and sneezing.    Eyes:  Negative for blurred vision, double vision, photophobia and discharge.   Respiratory:  Negative for apnea, cough, choking, chest tightness, shortness of breath and wheezing.    Cardiovascular:  Negative for chest pain, palpitations and leg swelling.   Gastrointestinal:  Negative for abdominal distention, abdominal pain, diarrhea, nausea and vomiting.   Genitourinary:  Negative for dysuria, flank pain and frequency.  "  Musculoskeletal:  Negative for arthralgias, gait problem, neck pain and neck stiffness.   Skin:  Negative for rash.   Neurological:  Negative for dizziness, tremors, seizures, syncope, speech difficulty, numbness, headache and confusion.   Psychiatric/Behavioral:  Negative for agitation, behavioral problems, hallucinations and suicidal ideas.       Objective     Vital Signs:  /77 (BP Location: Right arm, Patient Position: Sitting, Cuff Size: Small Adult)   Pulse 85   Temp 98 °F (36.7 °C) (Temporal)   Ht 177.8 cm (70\")   Wt 111 kg (245 lb 6.4 oz)   SpO2 98%   BMI 35.21 kg/m²   Estimated body mass index is 35.21 kg/m² as calculated from the following:    Height as of this encounter: 177.8 cm (70\").    Weight as of this encounter: 111 kg (245 lb 6.4 oz).    Physical Exam  Constitutional:       General: He is not in acute distress.     Appearance: Normal appearance. He is normal weight. He is not ill-appearing, toxic-appearing or diaphoretic.   HENT:      Head: Normocephalic and atraumatic.      Nose: Nose normal. No congestion or rhinorrhea.      Mouth/Throat:      Mouth: Mucous membranes are moist.      Pharynx: Oropharynx is clear.   Eyes:      General: No scleral icterus.     Extraocular Movements: Extraocular movements intact.      Pupils: Pupils are equal, round, and reactive to light.   Neck:      Vascular: No carotid bruit.   Cardiovascular:      Rate and Rhythm: Normal rate and regular rhythm.      Pulses: Normal pulses.      Heart sounds: Murmur heard.   Pulmonary:      Effort: Pulmonary effort is normal. No respiratory distress.      Breath sounds: Normal breath sounds. No stridor. No wheezing, rhonchi or rales.   Chest:      Chest wall: No tenderness.   Abdominal:      General: Abdomen is flat. Bowel sounds are normal. There is no distension.      Palpations: Abdomen is soft.      Tenderness: There is no abdominal tenderness. There is no guarding or rebound.   Musculoskeletal:         General: " Swelling (right lower extremity which is chronic) present.      Cervical back: Normal range of motion and neck supple. No rigidity or tenderness.      Comments: Right knee with no erythema, no warmth and mild swelling which is stable and chronic.   Lymphadenopathy:      Cervical: No cervical adenopathy.   Skin:     Coloration: Skin is not jaundiced.      Findings: No lesion or rash.   Neurological:      General: No focal deficit present.      Mental Status: He is alert and oriented to person, place, and time.   Psychiatric:         Mood and Affect: Mood normal.         Behavior: Behavior normal.        Result Review :  The following data was reviewed by Marilynn Giordano MD     Lab Results  Lab Results   Component Value Date    WBC 2.96 (L) 09/07/2023    HGB 13.7 09/07/2023    HCT 41.3 09/07/2023    MCV 83.1 09/07/2023    PLT 66 (L) 09/07/2023     Lab Results   Component Value Date    GLUCOSE 162 (H) 09/07/2023    BUN 13 09/07/2023    CREATININE 0.94 09/07/2023    BCR 13.8 09/07/2023    K 4.4 09/07/2023    CO2 21.4 (L) 09/07/2023    CALCIUM 9.0 09/07/2023    ALBUMIN 3.8 09/07/2023    AST 39 09/07/2023    ALT 27 09/07/2023      Lab Results   Component Value Date    CRP 0.38 09/07/2023        No results found for: ACANTHNAEG, AFBCX, BPERTUSSISCX, BLOODCX  No results found for: BCIDPCR, CXREFLEX, CSFCX, CULTURETIS  No results found for: CULTURES, HSVCX, URCX  No results found for: EYECULTURE, GCCX, HSVCULTURE, LABHSV  No results found for: LEGIONELLA, MRSACX, MUMPSCX, MYCOPLASCX  No results found for: NOCARDIACX, STOOLCX  No results found for: THROATCX, UNSTIMCULT, URINECX, CULTURE, VZVCULTUR  No results found for: VIRALCULTU, WOUNDCX    Radiology Results              Assessment / Plan        Diagnoses and all orders for this visit:    1. Endocarditis of tricuspid valve (Primary)  -     CBC & Differential; Future  -     Comprehensive Metabolic Panel; Future  -     C-reactive Protein; Future    2. Status post total  right knee replacement  -     CBC & Differential; Future  -     Comprehensive Metabolic Panel; Future  -     C-reactive Protein; Future    Labs stable with normal CRP level at 0.38 and stable WBC and thrombocytopenia. Normalized liver enzymes.     Patient is doing great with no complaints and no evidence of clinical sepsis. Will follow closely.     Continue with oral lifelong suppressive therapy with doxycycline 100 mg daily that was decreased from bid on 6/6/23.     Will check blood work on 10/6/23 and follow up with me on 10/10/23.            Follow Up   Return in about 4 weeks (around 10/10/2023) for Follow up, With Dr. Giordano.    Visit Diagnoses:    ICD-10-CM ICD-9-CM   1. Endocarditis of tricuspid valve  I07.9 424.2   2. Status post total right knee replacement  Z96.651 V43.65       Patient was given instructions and counseling regarding his condition or for health maintenance advice. Please see specific information pulled into the AVS if appropriate.     This document has been electronically signed by Marilynn Giordano MD   September 12, 2023 08:58 EDT    Dictated Utilizing Dragon Dictation: Part of this note may be an electronic transcription/translation of spoken language to printed text using the Dragon Dictation System.

## 2023-10-05 ENCOUNTER — LAB (OUTPATIENT)
Dept: LAB | Facility: HOSPITAL | Age: 58
End: 2023-10-05
Payer: MEDICAID

## 2023-10-05 DIAGNOSIS — I07.9 ENDOCARDITIS OF TRICUSPID VALVE: ICD-10-CM

## 2023-10-05 DIAGNOSIS — Z96.651 STATUS POST TOTAL RIGHT KNEE REPLACEMENT: ICD-10-CM

## 2023-10-05 LAB
ALBUMIN SERPL-MCNC: 3.8 G/DL (ref 3.5–5.2)
ALBUMIN/GLOB SERPL: 1.3 G/DL
ALP SERPL-CCNC: 97 U/L (ref 39–117)
ALT SERPL W P-5'-P-CCNC: 26 U/L (ref 1–41)
ANION GAP SERPL CALCULATED.3IONS-SCNC: 9.6 MMOL/L (ref 5–15)
AST SERPL-CCNC: 45 U/L (ref 1–40)
BASOPHILS # BLD AUTO: 0.02 10*3/MM3 (ref 0–0.2)
BASOPHILS NFR BLD AUTO: 0.6 % (ref 0–1.5)
BILIRUB SERPL-MCNC: 0.8 MG/DL (ref 0–1.2)
BUN SERPL-MCNC: 12 MG/DL (ref 6–20)
BUN/CREAT SERPL: 13.3 (ref 7–25)
CALCIUM SPEC-SCNC: 9.3 MG/DL (ref 8.6–10.5)
CHLORIDE SERPL-SCNC: 105 MMOL/L (ref 98–107)
CO2 SERPL-SCNC: 22.4 MMOL/L (ref 22–29)
CREAT SERPL-MCNC: 0.9 MG/DL (ref 0.76–1.27)
CRP SERPL-MCNC: 0.49 MG/DL (ref 0–0.5)
DEPRECATED RDW RBC AUTO: 43.3 FL (ref 37–54)
EGFRCR SERPLBLD CKD-EPI 2021: 99 ML/MIN/1.73
EOSINOPHIL # BLD AUTO: 0.1 10*3/MM3 (ref 0–0.4)
EOSINOPHIL NFR BLD AUTO: 3 % (ref 0.3–6.2)
ERYTHROCYTE [DISTWIDTH] IN BLOOD BY AUTOMATED COUNT: 14.5 % (ref 12.3–15.4)
GLOBULIN UR ELPH-MCNC: 2.9 GM/DL
GLUCOSE SERPL-MCNC: 126 MG/DL (ref 65–99)
HCT VFR BLD AUTO: 40.2 % (ref 37.5–51)
HGB BLD-MCNC: 13.4 G/DL (ref 13–17.7)
LYMPHOCYTES # BLD AUTO: 0.64 10*3/MM3 (ref 0.7–3.1)
LYMPHOCYTES NFR BLD AUTO: 19.3 % (ref 19.6–45.3)
MCH RBC QN AUTO: 27.7 PG (ref 26.6–33)
MCHC RBC AUTO-ENTMCNC: 33.3 G/DL (ref 31.5–35.7)
MCV RBC AUTO: 83.2 FL (ref 79–97)
MONOCYTES # BLD AUTO: 0.3 10*3/MM3 (ref 0.1–0.9)
MONOCYTES NFR BLD AUTO: 9 % (ref 5–12)
NEUTROPHILS NFR BLD AUTO: 2.26 10*3/MM3 (ref 1.7–7)
NEUTROPHILS NFR BLD AUTO: 68.1 % (ref 42.7–76)
PLATELET # BLD AUTO: 56 10*3/MM3 (ref 140–450)
PMV BLD AUTO: 12.3 FL (ref 6–12)
POTASSIUM SERPL-SCNC: 4.1 MMOL/L (ref 3.5–5.2)
PROT SERPL-MCNC: 6.7 G/DL (ref 6–8.5)
RBC # BLD AUTO: 4.83 10*6/MM3 (ref 4.14–5.8)
SODIUM SERPL-SCNC: 137 MMOL/L (ref 136–145)
WBC NRBC COR # BLD: 3.32 10*3/MM3 (ref 3.4–10.8)

## 2023-10-05 PROCEDURE — 86140 C-REACTIVE PROTEIN: CPT

## 2023-10-05 PROCEDURE — 85025 COMPLETE CBC W/AUTO DIFF WBC: CPT

## 2023-10-05 PROCEDURE — 36415 COLL VENOUS BLD VENIPUNCTURE: CPT

## 2023-10-05 PROCEDURE — 80053 COMPREHEN METABOLIC PANEL: CPT

## 2023-10-10 ENCOUNTER — OFFICE VISIT (OUTPATIENT)
Dept: INFECTIOUS DISEASES | Facility: CLINIC | Age: 58
End: 2023-10-10
Payer: MEDICAID

## 2023-10-10 VITALS
BODY MASS INDEX: 34.27 KG/M2 | OXYGEN SATURATION: 97 % | SYSTOLIC BLOOD PRESSURE: 160 MMHG | DIASTOLIC BLOOD PRESSURE: 82 MMHG | HEIGHT: 70 IN | HEART RATE: 93 BPM | WEIGHT: 239.4 LBS

## 2023-10-10 DIAGNOSIS — M00.061 STAPHYLOCOCCAL ARTHRITIS OF RIGHT KNEE: Primary | ICD-10-CM

## 2023-10-10 DIAGNOSIS — Z96.651 STATUS POST TOTAL RIGHT KNEE REPLACEMENT: ICD-10-CM

## 2023-10-10 NOTE — PROGRESS NOTES
[facility]         Referring Provider: Missy Up APRN  [address] [place] [address]    Subjective      Chief Complaint  Follow-up (bacteremia)    History of Present Illness  Melchor Hardwick III is a 58 y.o. male who presents today to [facility] for Follow Up . 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 No fever, no chills and no night sweats. I reviewed labs from last week and CRP remains normal but slightly higher at 0.49.       Past Medical History:   Diagnosis Date    Diabetes mellitus     4 times per day gets checked for sugar at rehab    Dizzy     Hyperlipidemia     Hypertension     Hypothyroid 11/30/2022    MRSA infection     blood -   2022    Orthostatic hypotension     Pressure sore on buttocks     top crack -  sees wound - but now rn take care of it at rehab - minor opening - dime size - pat nurse didnt assess-  pt states getting better    Pyogenic arthritis of right knee joint, due to unspecified organism 09/21/2022    Sepsis     Wears glasses     readers       Past Surgical History:   Procedure Laterality Date    ABSCESS DRAINAGE  2004    PERINEUM    AMPUTATION FOOT Left 05/18/2022    Procedure: AMPUTATION FOOT;  Surgeon: Addison Colby MD;  Location: [facility] OR;  Service: Podiatry;  Laterality: Left;    INCISION AND DRAINAGE LEG Left 05/10/2022    Procedure: INCISION AND DRAINAGE LOWER EXTREMITY;  Surgeon: Addiosn Colby MD;  Location: [facility] OR;  Service: Podiatry;  Laterality: Left;    KNEE INCISION AND DRAINAGE Right 09/21/2022    Procedure: KNEE INCISION AND DRAINAGE RIGHT;  Surgeon: Brain Bentley MD;  Location: [facility] OR;  Service: Orthopedics;  Laterality: Right;    PERIPHERALLY INSERTED CENTRAL CATHETER INSERTION Left     TOTAL KNEE  PROSTHESIS REMOVAL W/ SPACER INSERTION Right 11/30/2022    Procedure: ANTIBIOTIC SPACER PLACEMENT KNEE - RIGHT;  Surgeon: Mc  Brain VALDES MD;  Location:  [facility] OR;  Service: Orthopedics;  Laterality: Right;    WOUND DEBRIDEMENT Left 05/13/2022    Procedure: DEBRIDEMENT FOOT;  Surgeon: Addison Colby MD;  Location:  [facility] OR;  Service: Podiatry;  Laterality: Left;       Social History     Socioeconomic History    Marital status: Single   Tobacco Use    Smoking status: Never    Smokeless tobacco: Never   Vaping Use    Vaping Use: Never used   Substance and Sexual Activity    Alcohol use: Never    Drug use: Never    Sexual activity: Defer       Family History  family history is not on file.      There is no immunization history on file for this patient.     Allergies  No Known Allergies    The medication list has been reviewed and updated.   Current Medications    Current Outpatient Medications:     acetaminophen (TYLENOL) 325 MG tablet, Take 2 tablets by mouth Every 4 (Four) Hours As Needed for Mild Pain., Disp: , Rfl:     ascorbic acid (VITAMIN C) 500 MG tablet, Take 1 tablet by mouth Daily., Disp: , Rfl:     bisacodyl (DULCOLAX) 10 MG suppository, Insert 1 suppository into the rectum Daily As Needed for Constipation., Disp: , Rfl:     cyclobenzaprine (FLEXERIL) 5 MG tablet, Take 1 tablet by mouth 3 (Three) Times a Day As Needed for Muscle Spasms., Disp: , Rfl:     Diclofenac Sodium (VOLTAREN) 1 % gel gel, Apply 4 g topically to the appropriate area as directed 4 (Four) Times a Day As Needed., Disp: , Rfl:     docusate sodium 100 MG capsule, Take 1 capsule by mouth 2 (Two) Times a Day., Disp: , Rfl:     doxycycline (VIBRAMYICN) 100 MG tablet, Take 1 tablet by mouth Daily., Disp: , Rfl:     fludrocortisone 0.1 MG tablet, Take 0.5 tablets by mouth Daily., Disp: , Rfl:     Insulin Aspart (novoLOG) 100 UNIT/ML injection, Inject 0-14 Units under the skin into the appropriate area as directed 3 (Three) Times a Day Before Meals., Disp: , Rfl: 12    Insulin Aspart (novoLOG) 100 UNIT/ML injection, Inject 4 Units under the skin into the appropriate  area as directed 3 (Three) Times a Day Before Meals., Disp: , Rfl: 12    insulin detemir (LEVEMIR) 100 UNIT/ML injection, Inject 17 Units under the skin into the appropriate area as directed Every 12 (Twelve) Hours., Disp: , Rfl: 12    levothyroxine (SYNTHROID, LEVOTHROID) 50 MCG tablet, Take 1 tablet by mouth Daily., Disp: , Rfl:     meloxicam (MOBIC) 15 MG tablet, Take 1 tablet by mouth Daily. (Patient not taking: Reported on 7/11/2023), Disp: , Rfl:     midodrine (PROAMATINE) 2.5 MG tablet, Take 1 tablet by mouth 3 (Three) Times a Day Before Meals. Hold if SBP is above 120 (Patient not taking: Reported on 7/11/2023), Disp: , Rfl:     multivitamin with minerals tablet tablet, Take 1 tablet by mouth Daily., Disp: , Rfl:     pantoprazole (PROTONIX) 40 MG EC tablet, Take 1 tablet by mouth Every Morning., Disp: , Rfl:     polyethylene glycol (MIRALAX) 17 g packet, Take 17 g by mouth Daily., Disp: , Rfl:     pregabalin (LYRICA) 100 MG capsule, Take 1 capsule by mouth Every 12 (Twelve) Hours. (Patient taking differently: Take 1 capsule by mouth Every 12 (Twelve) Hours. Pt states he is taking 25 mg), Disp: , Rfl:     Vitamins A & D (magic barrier cream), Apply 1 application topically to the appropriate area as directed As Needed (skin irritation)., Disp: , Rfl:       Review of Systems    Review of Systems   Constitutional:  Negative for activity change, appetite change, chills, diaphoresis and fever.   HENT:  Negative for congestion, dental problem, drooling, mouth sores, sinus pressure and sneezing.    Eyes:  Negative for blurred vision, double vision, photophobia and discharge.   Respiratory:  Negative for apnea, cough, choking, chest tightness, shortness of breath and wheezing.    Cardiovascular:  Negative for chest pain, palpitations and leg swelling.   Gastrointestinal:  Negative for abdominal distention, abdominal pain, diarrhea, nausea and vomiting.   Genitourinary:  Negative for dysuria, flank pain and frequency.  "  Musculoskeletal:  Negative for arthralgias, gait problem, neck pain and neck stiffness.   Skin:  Negative for rash.   Neurological:  Negative for dizziness, tremors, seizures, syncope, speech difficulty, numbness, headache and confusion.   Psychiatric/Behavioral:  Negative for agitation, behavioral problems, hallucinations and suicidal ideas.         Objective     Vital Signs:  /82 (BP Location: Right arm, Patient Position: Sitting, Cuff Size: Small Adult)   Pulse 93   Ht 177.8 cm (70\")   Wt 109 kg (239 lb 6.4 oz)   SpO2 97%   BMI 34.35 kg/mý   Estimated body mass index is 34.35 kg/mý as calculated from the following:    Height as of this encounter: 177.8 cm (70\").    Weight as of this encounter: 109 kg (239 lb 6.4 oz).    Physical Exam  Constitutional:       General: He is not in acute distress.     Appearance: Normal appearance. He is normal weight. He is not ill-appearing, toxic-appearing or diaphoretic.   HENT:      Head: Normocephalic and atraumatic.      Nose: Nose normal. No congestion or rhinorrhea.      Mouth/Throat:      Mouth: Mucous membranes are moist.      Pharynx: Oropharynx is clear.   Eyes:      General: No scleral icterus.     Extraocular Movements: Extraocular movements intact.      Pupils: Pupils are equal, round, and reactive to light.   Neck:      Vascular: No carotid bruit.   Cardiovascular:      Rate and Rhythm: Normal rate and regular rhythm.      Pulses: Normal pulses.      Heart sounds: No murmur heard.  Pulmonary:      Effort: Pulmonary effort is normal. No respiratory distress.      Breath sounds: Normal breath sounds. No stridor. No wheezing, rhonchi or rales.   Chest:      Chest wall: No tenderness.   Abdominal:      General: Abdomen is flat. Bowel sounds are normal. There is no distension.      Palpations: Abdomen is soft.      Tenderness: There is no abdominal tenderness. There is no guarding or rebound.   Musculoskeletal:      Cervical back: Normal range of motion and " "neck supple. No rigidity or tenderness.      Comments: Right knee with less edema, no erythema, no warmth and no decreased range of motion.   Lymphadenopathy:      Cervical: No cervical adenopathy.   Skin:     Coloration: Skin is not jaundiced.      Findings: No lesion or rash.   Neurological:      General: No focal deficit present.      Mental Status: He is alert and oriented to person, place, and time.   Psychiatric:         Mood and Affect: Mood normal.         Behavior: Behavior normal.          Result Review :  The following data was reviewed by Marilynn Giordano MD     Lab Results  Lab Results   Component Value Date    WBC 3.32 (L) 10/05/2023    HGB 13.4 10/05/2023    HCT 40.2 10/05/2023    MCV 83.2 10/05/2023    PLT 56 (L) 10/05/2023     Lab Results   Component Value Date    GLUCOSE 126 (H) 10/05/2023    BUN 12 10/05/2023    CREATININE 0.90 10/05/2023    BCR 13.3 10/05/2023    K 4.1 10/05/2023    CO2 22.4 10/05/2023    CALCIUM 9.3 10/05/2023    ALBUMIN 3.8 10/05/2023    AST 45 (H) 10/05/2023    ALT 26 10/05/2023      Lab Results   Component Value Date    CRP 0.49 10/05/2023        No results found for: \"ACANTHNAEG\", \"AFBCX\", \"BPERTUSSISCX\", \"BLOODCX\"  No results found for: \"BCIDPCR\", \"CXREFLEX\", \"CSFCX\", \"CULTURETIS\"  No results found for: \"CULTURES\", \"HSVCX\", \"URCX\"  No results found for: \"EYECULTURE\", \"GCCX\", \"HSVCULTURE\", \"LABHSV\"  No results found for: \"LEGIONELLA\", \"MRSACX\", \"MUMPSCX\", \"MYCOPLASCX\"  No results found for: \"NOCARDIACX\", \"STOOLCX\"  No results found for: \"THROATCX\", \"UNSTIMCULT\", \"URINECX\", \"CULTURE\", \"VZVCULTUR\"  No results found for: \"VIRALCULTU\", \"WOUNDCX\"    Radiology Results              Assessment / Plan        Diagnoses and all orders for this visit:    1. Staphylococcal arthritis of right knee (Primary)  -     CBC & Differential; Future  -     Comprehensive Metabolic Panel; Future  -     C-reactive Protein; Future    2. Status post total right knee replacement  -     CBC & " Differential; Future  -     Comprehensive Metabolic Panel; Future  -     C-reactive Protein; Future      Labs stable with normal CRP level but slightly rising at 0.49 and stable WBC and thrombocytopenia and liver enzymes.     Patient is doing great with no complaints and no evidence of clinical sepsis. Will follow closely.     Continue with oral lifelong suppressive therapy with doxycycline 100 mg daily that was decreased from bid on 6/6/23.     Will check blood work on 11/3/23 and follow up with me on 11/3/23.          Follow Up   Return in about 4 weeks (around 11/7/2023) for Follow up, With Dr. Giordano.    Visit Diagnoses:    ICD-10-CM ICD-9-CM   1. Staphylococcal arthritis of right knee  M00.061 711.06     041.10   2. Status post total right knee replacement  Z96.651 V43.65       Patient was given instructions and counseling regarding his condition or for health maintenance advice. Please see specific information pulled into the AVS if appropriate.     This document has been electronically signed by Marilynn Giordano MD   October 10, 2023 09:19 EDT    Dictated Utilizing Dragon Dictation: Part of this note may be an electronic transcription/translation of spoken language to printed text using the Dragon Dictation System.

## 2023-11-02 ENCOUNTER — LAB (OUTPATIENT)
Dept: LAB | Facility: HOSPITAL | Age: 58
End: 2023-11-02
Payer: MEDICAID

## 2023-11-02 DIAGNOSIS — M00.061 STAPHYLOCOCCAL ARTHRITIS OF RIGHT KNEE: ICD-10-CM

## 2023-11-02 DIAGNOSIS — Z96.651 STATUS POST TOTAL RIGHT KNEE REPLACEMENT: ICD-10-CM

## 2023-11-02 LAB
ALBUMIN SERPL-MCNC: 3.9 G/DL (ref 3.5–5.2)
ALBUMIN/GLOB SERPL: 1.1 G/DL
ALP SERPL-CCNC: 107 U/L (ref 39–117)
ALT SERPL W P-5'-P-CCNC: 34 U/L (ref 1–41)
ANION GAP SERPL CALCULATED.3IONS-SCNC: 8.9 MMOL/L (ref 5–15)
AST SERPL-CCNC: 55 U/L (ref 1–40)
BASOPHILS # BLD AUTO: 0.01 10*3/MM3 (ref 0–0.2)
BASOPHILS NFR BLD AUTO: 0.3 % (ref 0–1.5)
BILIRUB SERPL-MCNC: 0.7 MG/DL (ref 0–1.2)
BUN SERPL-MCNC: 15 MG/DL (ref 6–20)
BUN/CREAT SERPL: 16.9 (ref 7–25)
CALCIUM SPEC-SCNC: 9 MG/DL (ref 8.6–10.5)
CHLORIDE SERPL-SCNC: 104 MMOL/L (ref 98–107)
CO2 SERPL-SCNC: 23.1 MMOL/L (ref 22–29)
CREAT SERPL-MCNC: 0.89 MG/DL (ref 0.76–1.27)
CRP SERPL-MCNC: 0.47 MG/DL (ref 0–0.5)
DEPRECATED RDW RBC AUTO: 46.3 FL (ref 37–54)
EGFRCR SERPLBLD CKD-EPI 2021: 99.3 ML/MIN/1.73
EOSINOPHIL # BLD AUTO: 0.11 10*3/MM3 (ref 0–0.4)
EOSINOPHIL NFR BLD AUTO: 2.8 % (ref 0.3–6.2)
ERYTHROCYTE [DISTWIDTH] IN BLOOD BY AUTOMATED COUNT: 15 % (ref 12.3–15.4)
GLOBULIN UR ELPH-MCNC: 3.4 GM/DL
GLUCOSE SERPL-MCNC: 153 MG/DL (ref 65–99)
HCT VFR BLD AUTO: 43.1 % (ref 37.5–51)
HGB BLD-MCNC: 13.9 G/DL (ref 13–17.7)
IMM GRANULOCYTES # BLD AUTO: 0.01 10*3/MM3 (ref 0–0.05)
IMM GRANULOCYTES NFR BLD AUTO: 0.3 % (ref 0–0.5)
LYMPHOCYTES # BLD AUTO: 0.72 10*3/MM3 (ref 0.7–3.1)
LYMPHOCYTES NFR BLD AUTO: 18.1 % (ref 19.6–45.3)
MCH RBC QN AUTO: 27.2 PG (ref 26.6–33)
MCHC RBC AUTO-ENTMCNC: 32.3 G/DL (ref 31.5–35.7)
MCV RBC AUTO: 84.3 FL (ref 79–97)
MONOCYTES # BLD AUTO: 0.27 10*3/MM3 (ref 0.1–0.9)
MONOCYTES NFR BLD AUTO: 6.8 % (ref 5–12)
NEUTROPHILS NFR BLD AUTO: 2.86 10*3/MM3 (ref 1.7–7)
NEUTROPHILS NFR BLD AUTO: 71.7 % (ref 42.7–76)
NRBC BLD AUTO-RTO: 0 /100 WBC (ref 0–0.2)
PLATELET # BLD AUTO: 59 10*3/MM3 (ref 140–450)
PMV BLD AUTO: 11.8 FL (ref 6–12)
POTASSIUM SERPL-SCNC: 5.1 MMOL/L (ref 3.5–5.2)
PROT SERPL-MCNC: 7.3 G/DL (ref 6–8.5)
RBC # BLD AUTO: 5.11 10*6/MM3 (ref 4.14–5.8)
SODIUM SERPL-SCNC: 136 MMOL/L (ref 136–145)
WBC NRBC COR # BLD: 3.98 10*3/MM3 (ref 3.4–10.8)

## 2023-11-02 PROCEDURE — 36415 COLL VENOUS BLD VENIPUNCTURE: CPT

## 2023-11-02 PROCEDURE — 85025 COMPLETE CBC W/AUTO DIFF WBC: CPT

## 2023-11-02 PROCEDURE — 80053 COMPREHEN METABOLIC PANEL: CPT

## 2023-11-02 PROCEDURE — 86140 C-REACTIVE PROTEIN: CPT

## 2023-11-07 ENCOUNTER — OFFICE VISIT (OUTPATIENT)
Dept: INFECTIOUS DISEASES | Facility: CLINIC | Age: 58
End: 2023-11-07
Payer: MEDICAID

## 2023-11-07 VITALS
DIASTOLIC BLOOD PRESSURE: 76 MMHG | TEMPERATURE: 97.7 F | SYSTOLIC BLOOD PRESSURE: 141 MMHG | OXYGEN SATURATION: 97 % | HEIGHT: 70 IN | WEIGHT: 243.6 LBS | BODY MASS INDEX: 34.88 KG/M2 | HEART RATE: 82 BPM

## 2023-11-07 DIAGNOSIS — M00.061 STAPHYLOCOCCAL ARTHRITIS OF RIGHT KNEE: ICD-10-CM

## 2023-11-07 DIAGNOSIS — Z96.651 STATUS POST TOTAL RIGHT KNEE REPLACEMENT: Primary | ICD-10-CM

## 2023-11-07 NOTE — PROGRESS NOTES
[facility]         Referring Provider: Missy Up APRN  [address] [place] [address]    Subjective      Chief Complaint  Follow-up (bacteremia)    History of Present Illness  Melchor Hardwick III is a 58 y.o. male who presents today to [facility] for Follow Up . 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 No fever, no chills and no night sweats. I reviewed labs from last week and CRP remains normal and improving at 0.47 on 11/2/23.       Past Medical History:   Diagnosis Date    Diabetes mellitus     4 times per day gets checked for sugar at rehab    Dizzy     Hyperlipidemia     Hypertension     Hypothyroid 11/30/2022    MRSA infection     blood -   2022    Orthostatic hypotension     Pressure sore on buttocks     top crack -  sees wound - but now rn take care of it at rehab - minor opening - dime size - pat nurse didnt assess-  pt states getting better    Pyogenic arthritis of right knee joint, due to unspecified organism 09/21/2022    Sepsis     Wears glasses     readers       Past Surgical History:   Procedure Laterality Date    ABSCESS DRAINAGE  2004    PERINEUM    AMPUTATION FOOT Left 05/18/2022    Procedure: AMPUTATION FOOT;  Surgeon: Addison Colby MD;  Location: [facility] OR;  Service: Podiatry;  Laterality: Left;    INCISION AND DRAINAGE LEG Left 05/10/2022    Procedure: INCISION AND DRAINAGE LOWER EXTREMITY;  Surgeon: Addison Colby MD;  Location: [facility] OR;  Service: Podiatry;  Laterality: Left;    KNEE INCISION AND DRAINAGE Right 09/21/2022    Procedure: KNEE INCISION AND DRAINAGE RIGHT;  Surgeon: Brain Bentley MD;  Location: [facility] OR;  Service: Orthopedics;  Laterality: Right;    PERIPHERALLY INSERTED CENTRAL CATHETER INSERTION Left     TOTAL KNEE  PROSTHESIS REMOVAL W/ SPACER INSERTION Right 11/30/2022    Procedure: ANTIBIOTIC SPACER PLACEMENT KNEE - RIGHT;  Surgeon:  Brain Bentley MD;  Location:  [facility] OR;  Service: Orthopedics;  Laterality: Right;    WOUND DEBRIDEMENT Left 05/13/2022    Procedure: DEBRIDEMENT FOOT;  Surgeon: Addison Colby MD;  Location:  [facility] OR;  Service: Podiatry;  Laterality: Left;       Social History     Socioeconomic History    Marital status: Single   Tobacco Use    Smoking status: Never    Smokeless tobacco: Never   Vaping Use    Vaping Use: Never used   Substance and Sexual Activity    Alcohol use: Never    Drug use: Never    Sexual activity: Defer       Family History  family history is not on file.      There is no immunization history on file for this patient.     Allergies  No Known Allergies    The medication list has been reviewed and updated.   Current Medications    Current Outpatient Medications:     acetaminophen (TYLENOL) 325 MG tablet, Take 2 tablets by mouth Every 4 (Four) Hours As Needed for Mild Pain., Disp: , Rfl:     ascorbic acid (VITAMIN C) 500 MG tablet, Take 1 tablet by mouth Daily., Disp: , Rfl:     bisacodyl (DULCOLAX) 10 MG suppository, Insert 1 suppository into the rectum Daily As Needed for Constipation., Disp: , Rfl:     cyclobenzaprine (FLEXERIL) 5 MG tablet, Take 1 tablet by mouth 3 (Three) Times a Day As Needed for Muscle Spasms., Disp: , Rfl:     Diclofenac Sodium (VOLTAREN) 1 % gel gel, Apply 4 g topically to the appropriate area as directed 4 (Four) Times a Day As Needed., Disp: , Rfl:     docusate sodium 100 MG capsule, Take 1 capsule by mouth 2 (Two) Times a Day., Disp: , Rfl:     doxycycline (VIBRAMYICN) 100 MG tablet, Take 1 tablet by mouth Daily., Disp: , Rfl:     fludrocortisone 0.1 MG tablet, Take 0.5 tablets by mouth Daily., Disp: , Rfl:     Insulin Aspart (novoLOG) 100 UNIT/ML injection, Inject 0-14 Units under the skin into the appropriate area as directed 3 (Three) Times a Day Before Meals., Disp: , Rfl: 12    Insulin Aspart (novoLOG) 100 UNIT/ML injection, Inject 4 Units under the skin into the  appropriate area as directed 3 (Three) Times a Day Before Meals., Disp: , Rfl: 12    insulin detemir (LEVEMIR) 100 UNIT/ML injection, Inject 17 Units under the skin into the appropriate area as directed Every 12 (Twelve) Hours., Disp: , Rfl: 12    levothyroxine (SYNTHROID, LEVOTHROID) 50 MCG tablet, Take 1 tablet by mouth Daily., Disp: , Rfl:     meloxicam (MOBIC) 15 MG tablet, Take 1 tablet by mouth Daily. (Patient not taking: Reported on 7/11/2023), Disp: , Rfl:     midodrine (PROAMATINE) 2.5 MG tablet, Take 1 tablet by mouth 3 (Three) Times a Day Before Meals. Hold if SBP is above 120 (Patient not taking: Reported on 7/11/2023), Disp: , Rfl:     multivitamin with minerals tablet tablet, Take 1 tablet by mouth Daily., Disp: , Rfl:     pantoprazole (PROTONIX) 40 MG EC tablet, Take 1 tablet by mouth Every Morning., Disp: , Rfl:     polyethylene glycol (MIRALAX) 17 g packet, Take 17 g by mouth Daily., Disp: , Rfl:     pregabalin (LYRICA) 100 MG capsule, Take 1 capsule by mouth Every 12 (Twelve) Hours. (Patient taking differently: Take 1 capsule by mouth Every 12 (Twelve) Hours. Pt states he is taking 25 mg), Disp: , Rfl:     Vitamins A & D (magic barrier cream), Apply 1 application topically to the appropriate area as directed As Needed (skin irritation)., Disp: , Rfl:       Review of Systems    Review of Systems   Constitutional:  Negative for activity change, appetite change, chills, diaphoresis and fever.   HENT:  Negative for congestion, dental problem, drooling, mouth sores, sinus pressure and sneezing.    Eyes:  Negative for blurred vision, double vision, photophobia and discharge.   Respiratory:  Negative for apnea, cough, choking, chest tightness, shortness of breath and wheezing.    Cardiovascular:  Negative for chest pain, palpitations and leg swelling.   Gastrointestinal:  Negative for abdominal distention, abdominal pain, diarrhea, nausea and vomiting.   Genitourinary:  Negative for dysuria, flank pain  "and frequency.   Musculoskeletal:  Negative for arthralgias, gait problem, neck pain and neck stiffness.   Skin:  Negative for rash.   Neurological:  Negative for dizziness, tremors, seizures, syncope, speech difficulty, numbness, headache and confusion.   Psychiatric/Behavioral:  Negative for agitation, behavioral problems, hallucinations and suicidal ideas.         Objective     Vital Signs:  /76 (BP Location: Right arm, Patient Position: Sitting, Cuff Size: Small Adult)   Pulse 82   Temp 97.7 °F (36.5 °C) (Temporal)   Ht 177.8 cm (70\")   Wt 110 kg (243 lb 9.6 oz)   SpO2 97%   BMI 34.95 kg/m²   Estimated body mass index is 34.95 kg/m² as calculated from the following:    Height as of this encounter: 177.8 cm (70\").    Weight as of this encounter: 110 kg (243 lb 9.6 oz).    Physical Exam  Constitutional:       General: He is not in acute distress.     Appearance: Normal appearance. He is normal weight. He is not ill-appearing, toxic-appearing or diaphoretic.   HENT:      Head: Normocephalic and atraumatic.      Nose: Nose normal. No congestion or rhinorrhea.      Mouth/Throat:      Mouth: Mucous membranes are moist.      Pharynx: Oropharynx is clear.   Eyes:      General: No scleral icterus.     Extraocular Movements: Extraocular movements intact.      Pupils: Pupils are equal, round, and reactive to light.   Neck:      Vascular: No carotid bruit.   Cardiovascular:      Rate and Rhythm: Normal rate and regular rhythm.      Pulses: Normal pulses.      Heart sounds: No murmur heard.  Pulmonary:      Effort: Pulmonary effort is normal. No respiratory distress.      Breath sounds: Normal breath sounds. No stridor. No wheezing, rhonchi or rales.   Chest:      Chest wall: No tenderness.   Abdominal:      General: Abdomen is flat. Bowel sounds are normal. There is no distension.      Palpations: Abdomen is soft.      Tenderness: There is no abdominal tenderness. There is no guarding or rebound. " "  Musculoskeletal:      Cervical back: Normal range of motion and neck supple. No rigidity or tenderness.      Comments: Right knee improved with no erythema, no warmth, no decreased range of motion and is less swollen today.   Lymphadenopathy:      Cervical: No cervical adenopathy.   Skin:     Coloration: Skin is not jaundiced.      Findings: No lesion or rash.   Neurological:      General: No focal deficit present.      Mental Status: He is alert and oriented to person, place, and time.   Psychiatric:         Mood and Affect: Mood normal.         Behavior: Behavior normal.          Result Review :  The following data was reviewed by Marilynn Giordano MD     Lab Results  Lab Results   Component Value Date    WBC 3.98 11/02/2023    HGB 13.9 11/02/2023    HCT 43.1 11/02/2023    MCV 84.3 11/02/2023    PLT 59 (L) 11/02/2023     Lab Results   Component Value Date    GLUCOSE 153 (H) 11/02/2023    BUN 15 11/02/2023    CREATININE 0.89 11/02/2023    BCR 16.9 11/02/2023    K 5.1 11/02/2023    CO2 23.1 11/02/2023    CALCIUM 9.0 11/02/2023    ALBUMIN 3.9 11/02/2023    AST 55 (H) 11/02/2023    ALT 34 11/02/2023      Lab Results   Component Value Date    CRP 0.47 11/02/2023        No results found for: \"ACANTHNAEG\", \"AFBCX\", \"BPERTUSSISCX\", \"BLOODCX\"  No results found for: \"BCIDPCR\", \"CXREFLEX\", \"CSFCX\", \"CULTURETIS\"  No results found for: \"CULTURES\", \"HSVCX\", \"URCX\"  No results found for: \"EYECULTURE\", \"GCCX\", \"HSVCULTURE\", \"LABHSV\"  No results found for: \"LEGIONELLA\", \"MRSACX\", \"MUMPSCX\", \"MYCOPLASCX\"  No results found for: \"NOCARDIACX\", \"STOOLCX\"  No results found for: \"THROATCX\", \"UNSTIMCULT\", \"URINECX\", \"CULTURE\", \"VZVCULTUR\"  No results found for: \"VIRALCULTU\", \"WOUNDCX\"    Radiology Results              Assessment / Plan        Diagnoses and all orders for this visit:    1. Status post total right knee replacement (Primary)    2. Staphylococcal arthritis of right knee    Labs stable with normal CRP level improving at " 0.47 and stable WBC and thrombocytopenia and liver enzymes.     Patient is doing great with no complaints and no evidence of clinical sepsis. Will follow closely.     Continue with oral lifelong suppressive therapy with doxycycline 100 mg daily that was decreased from bid on 6/6/23.     Will check blood work on 12/1//23 and follow up with me on 12/5/23.            Follow Up   Return in about 4 weeks (around 12/5/2023) for Follow up, With Dr. Giordano.    Visit Diagnoses:    ICD-10-CM ICD-9-CM   1. Status post total right knee replacement  Z96.651 V43.65   2. Staphylococcal arthritis of right knee  M00.061 711.06     041.10       Patient was given instructions and counseling regarding his condition or for health maintenance advice. Please see specific information pulled into the AVS if appropriate.     This document has been electronically signed by Marilynn Giordano MD   November 7, 2023 08:44 EST    Dictated Utilizing Dragon Dictation: Part of this note may be an electronic transcription/translation of spoken language to printed text using the Dragon Dictation System.

## 2023-11-30 ENCOUNTER — LAB (OUTPATIENT)
Dept: LAB | Facility: HOSPITAL | Age: 58
End: 2023-11-30
Payer: MEDICAID

## 2023-11-30 DIAGNOSIS — M00.061 STAPHYLOCOCCAL ARTHRITIS OF RIGHT KNEE: Primary | ICD-10-CM

## 2023-11-30 LAB — CRP SERPL-MCNC: 0.75 MG/DL (ref 0–0.5)

## 2023-11-30 PROCEDURE — 36415 COLL VENOUS BLD VENIPUNCTURE: CPT | Performed by: INTERNAL MEDICINE

## 2023-11-30 PROCEDURE — 86140 C-REACTIVE PROTEIN: CPT | Performed by: INTERNAL MEDICINE

## 2023-12-05 ENCOUNTER — OFFICE VISIT (OUTPATIENT)
Dept: INFECTIOUS DISEASES | Facility: CLINIC | Age: 58
End: 2023-12-05
Payer: MEDICAID

## 2023-12-05 VITALS
BODY MASS INDEX: 35.36 KG/M2 | SYSTOLIC BLOOD PRESSURE: 160 MMHG | HEART RATE: 81 BPM | OXYGEN SATURATION: 99 % | DIASTOLIC BLOOD PRESSURE: 76 MMHG | WEIGHT: 247 LBS | HEIGHT: 70 IN | TEMPERATURE: 97.9 F

## 2023-12-05 DIAGNOSIS — M00.061 STAPHYLOCOCCAL ARTHRITIS OF RIGHT KNEE: ICD-10-CM

## 2023-12-05 DIAGNOSIS — Z96.651 STATUS POST TOTAL RIGHT KNEE REPLACEMENT: Primary | ICD-10-CM

## 2023-12-05 NOTE — PROGRESS NOTES
[facility]         Referring Provider: Missy Up APRN  [address] [place] [address]    Subjective      Chief Complaint  Follow-up (bacteremia)    History of Present Illness  Melchor Hardwick III is a 58 y.o. male who presents today to [facility] for Follow Up . 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 No fever, no chills and no night sweats. I reviewed labs from last week and CRP is worse at 0.75 on 11/30/23.    Pain is about the same, no fever, no chills and no erythema, no swelling of the knee. No clinical sepsis.    Past Medical History:   Diagnosis Date    Diabetes mellitus     4 times per day gets checked for sugar at rehab    Dizzy     Hyperlipidemia     Hypertension     Hypothyroid 11/30/2022    MRSA infection     blood -   2022    Orthostatic hypotension     Pressure sore on buttocks     top crack -  sees wound - but now rn take care of it at rehab - minor opening - dime size - pat nurse didnt assess-  pt states getting better    Pyogenic arthritis of right knee joint, due to unspecified organism 09/21/2022    Sepsis     Wears glasses     readers       Past Surgical History:   Procedure Laterality Date    ABSCESS DRAINAGE  2004    PERINEUM    AMPUTATION FOOT Left 05/18/2022    Procedure: AMPUTATION FOOT;  Surgeon: Addison Colby MD;  Location: [facility] OR;  Service: Podiatry;  Laterality: Left;    INCISION AND DRAINAGE LEG Left 05/10/2022    Procedure: INCISION AND DRAINAGE LOWER EXTREMITY;  Surgeon: Addison Colby MD;  Location: [facility] OR;  Service: Podiatry;  Laterality: Left;    KNEE INCISION AND DRAINAGE Right 09/21/2022    Procedure: KNEE INCISION AND DRAINAGE RIGHT;  Surgeon: Brain Bentley MD;  Location: [facility] OR;  Service: Orthopedics;  Laterality: Right;    PERIPHERALLY INSERTED CENTRAL CATHETER INSERTION Left     TOTAL KNEE  PROSTHESIS REMOVAL W/ SPACER INSERTION  Right 11/30/2022    Procedure: ANTIBIOTIC SPACER PLACEMENT KNEE - RIGHT;  Surgeon: Brain Bentley MD;  Location:  [facility] OR;  Service: Orthopedics;  Laterality: Right;    WOUND DEBRIDEMENT Left 05/13/2022    Procedure: DEBRIDEMENT FOOT;  Surgeon: Addison Colby MD;  Location:  [facility] OR;  Service: Podiatry;  Laterality: Left;       Social History     Socioeconomic History    Marital status: Single   Tobacco Use    Smoking status: Never    Smokeless tobacco: Never   Vaping Use    Vaping Use: Never used   Substance and Sexual Activity    Alcohol use: Never    Drug use: Never    Sexual activity: Defer       Family History  family history is not on file.      There is no immunization history on file for this patient.     Allergies  No Known Allergies    The medication list has been reviewed and updated.   Current Medications    Current Outpatient Medications:     acetaminophen (TYLENOL) 325 MG tablet, Take 2 tablets by mouth Every 4 (Four) Hours As Needed for Mild Pain., Disp: , Rfl:     ascorbic acid (VITAMIN C) 500 MG tablet, Take 1 tablet by mouth Daily., Disp: , Rfl:     bisacodyl (DULCOLAX) 10 MG suppository, Insert 1 suppository into the rectum Daily As Needed for Constipation., Disp: , Rfl:     cyclobenzaprine (FLEXERIL) 5 MG tablet, Take 1 tablet by mouth 3 (Three) Times a Day As Needed for Muscle Spasms., Disp: , Rfl:     Diclofenac Sodium (VOLTAREN) 1 % gel gel, Apply 4 g topically to the appropriate area as directed 4 (Four) Times a Day As Needed., Disp: , Rfl:     docusate sodium 100 MG capsule, Take 1 capsule by mouth 2 (Two) Times a Day., Disp: , Rfl:     doxycycline (VIBRAMYICN) 100 MG tablet, Take 1 tablet by mouth Daily., Disp: , Rfl:     fludrocortisone 0.1 MG tablet, Take 0.5 tablets by mouth Daily., Disp: , Rfl:     Insulin Aspart (novoLOG) 100 UNIT/ML injection, Inject 0-14 Units under the skin into the appropriate area as directed 3 (Three) Times a Day Before Meals., Disp: , Rfl: 12    Insulin  Aspart (novoLOG) 100 UNIT/ML injection, Inject 4 Units under the skin into the appropriate area as directed 3 (Three) Times a Day Before Meals., Disp: , Rfl: 12    insulin detemir (LEVEMIR) 100 UNIT/ML injection, Inject 17 Units under the skin into the appropriate area as directed Every 12 (Twelve) Hours., Disp: , Rfl: 12    levothyroxine (SYNTHROID, LEVOTHROID) 50 MCG tablet, Take 1 tablet by mouth Daily., Disp: , Rfl:     meloxicam (MOBIC) 15 MG tablet, Take 1 tablet by mouth Daily. (Patient not taking: Reported on 7/11/2023), Disp: , Rfl:     midodrine (PROAMATINE) 2.5 MG tablet, Take 1 tablet by mouth 3 (Three) Times a Day Before Meals. Hold if SBP is above 120 (Patient not taking: Reported on 7/11/2023), Disp: , Rfl:     multivitamin with minerals tablet tablet, Take 1 tablet by mouth Daily., Disp: , Rfl:     pantoprazole (PROTONIX) 40 MG EC tablet, Take 1 tablet by mouth Every Morning., Disp: , Rfl:     polyethylene glycol (MIRALAX) 17 g packet, Take 17 g by mouth Daily., Disp: , Rfl:     pregabalin (LYRICA) 100 MG capsule, Take 1 capsule by mouth Every 12 (Twelve) Hours. (Patient taking differently: Take 1 capsule by mouth Every 12 (Twelve) Hours. Pt states he is taking 25 mg), Disp: , Rfl:     Vitamins A & D (magic barrier cream), Apply 1 application topically to the appropriate area as directed As Needed (skin irritation)., Disp: , Rfl:       Review of Systems    Review of Systems   Constitutional:  Negative for activity change, appetite change, chills, diaphoresis and fever.   HENT:  Negative for congestion, dental problem, drooling, mouth sores, sinus pressure and sneezing.    Eyes:  Negative for blurred vision, double vision, photophobia and discharge.   Respiratory:  Negative for apnea, cough, choking, chest tightness, shortness of breath and wheezing.    Cardiovascular:  Negative for chest pain, palpitations and leg swelling.   Gastrointestinal:  Negative for abdominal distention, abdominal pain,  "diarrhea, nausea and vomiting.   Genitourinary:  Negative for dysuria, flank pain and frequency.   Musculoskeletal:  Negative for arthralgias, gait problem, neck pain and neck stiffness.   Skin:  Negative for rash.   Neurological:  Negative for dizziness, tremors, seizures, syncope, speech difficulty, numbness, headache and confusion.   Psychiatric/Behavioral:  Negative for agitation, behavioral problems, hallucinations and suicidal ideas.         Objective     Vital Signs:  /76 (BP Location: Right arm, Patient Position: Sitting, Cuff Size: Small Adult)   Pulse 81   Temp 97.9 °F (36.6 °C) (Temporal)   Ht 177.8 cm (70\")   Wt 112 kg (247 lb)   SpO2 99%   BMI 35.44 kg/m²   Estimated body mass index is 35.44 kg/m² as calculated from the following:    Height as of this encounter: 177.8 cm (70\").    Weight as of this encounter: 112 kg (247 lb).    Physical Exam  Constitutional:       General: He is not in acute distress.     Appearance: Normal appearance. He is normal weight. He is not ill-appearing, toxic-appearing or diaphoretic.   HENT:      Head: Normocephalic and atraumatic.      Nose: Nose normal. No congestion or rhinorrhea.      Mouth/Throat:      Mouth: Mucous membranes are moist.      Pharynx: Oropharynx is clear.   Eyes:      General: No scleral icterus.     Extraocular Movements: Extraocular movements intact.      Pupils: Pupils are equal, round, and reactive to light.   Neck:      Vascular: No carotid bruit.   Cardiovascular:      Rate and Rhythm: Normal rate and regular rhythm.      Pulses: Normal pulses.      Heart sounds: No murmur heard.  Pulmonary:      Effort: Pulmonary effort is normal. No respiratory distress.      Breath sounds: Normal breath sounds. No stridor. No wheezing, rhonchi or rales.   Chest:      Chest wall: No tenderness.   Abdominal:      General: Abdomen is flat. Bowel sounds are normal. There is no distension.      Palpations: Abdomen is soft.      Tenderness: There is no " "abdominal tenderness. There is no guarding or rebound.   Musculoskeletal:      Cervical back: Normal range of motion and neck supple. No rigidity or tenderness.      Comments: Right knee looks great with no swelling, no erythema and no warmth.   Lymphadenopathy:      Cervical: No cervical adenopathy.   Skin:     Coloration: Skin is not jaundiced.      Findings: No lesion or rash.   Neurological:      General: No focal deficit present.      Mental Status: He is alert and oriented to person, place, and time.   Psychiatric:         Mood and Affect: Mood normal.         Behavior: Behavior normal.          Result Review :  The following data was reviewed by Marilynn Giordano MD     Lab Results  Lab Results   Component Value Date    WBC 3.98 11/02/2023    HGB 13.9 11/02/2023    HCT 43.1 11/02/2023    MCV 84.3 11/02/2023    PLT 59 (L) 11/02/2023     Lab Results   Component Value Date    GLUCOSE 153 (H) 11/02/2023    BUN 15 11/02/2023    CREATININE 0.89 11/02/2023    BCR 16.9 11/02/2023    K 5.1 11/02/2023    CO2 23.1 11/02/2023    CALCIUM 9.0 11/02/2023    ALBUMIN 3.9 11/02/2023    AST 55 (H) 11/02/2023    ALT 34 11/02/2023      Lab Results   Component Value Date    CRP 0.75 (H) 11/30/2023        No results found for: \"ACANTHNAEG\", \"AFBCX\", \"BPERTUSSISCX\", \"BLOODCX\"  No results found for: \"BCIDPCR\", \"CXREFLEX\", \"CSFCX\", \"CULTURETIS\"  No results found for: \"CULTURES\", \"HSVCX\", \"URCX\"  No results found for: \"EYECULTURE\", \"GCCX\", \"HSVCULTURE\", \"LABHSV\"  No results found for: \"LEGIONELLA\", \"MRSACX\", \"MUMPSCX\", \"MYCOPLASCX\"  No results found for: \"NOCARDIACX\", \"STOOLCX\"  No results found for: \"THROATCX\", \"UNSTIMCULT\", \"URINECX\", \"CULTURE\", \"VZVCULTUR\"  No results found for: \"VIRALCULTU\", \"WOUNDCX\"    Radiology Results              Assessment / Plan        Diagnoses and all orders for this visit:    1. Status post total right knee replacement (Primary)  -     CBC & Differential; Future  -     Comprehensive Metabolic Panel; " Future  -     C-reactive Protein; Future    2. Staphylococcal arthritis of right knee  -     CBC & Differential; Future  -     Comprehensive Metabolic Panel; Future  -     C-reactive Protein; Future    Labs with worsening CRP level at 0.75 but no evidence of clinical sepsis to be concerned about.    Will recheck and if continues to worsen, would initiated work up.     Patient is doing great with no complaints and no evidence of clinical sepsis. Will follow closely.     Continue with oral lifelong suppressive therapy with doxycycline 100 mg daily that was decreased from bid on 6/6/23.     Will check blood work on 12/1//23 and follow up with me on 12/5/23.            Follow Up   Return in about 4 weeks (around 1/2/2024) for Follow up, With Dr. Giordano.    Visit Diagnoses:    ICD-10-CM ICD-9-CM   1. Status post total right knee replacement  Z96.651 V43.65   2. Staphylococcal arthritis of right knee  M00.061 711.06     041.10       Patient was given instructions and counseling regarding his condition or for health maintenance advice. Please see specific information pulled into the AVS if appropriate.     This document has been electronically signed by Marilynn Giordano MD   December 5, 2023 08:38 EST    Dictated Utilizing Dragon Dictation: Part of this note may be an electronic transcription/translation of spoken language to printed text using the Dragon Dictation System.

## 2023-12-28 ENCOUNTER — LAB (OUTPATIENT)
Dept: LAB | Facility: HOSPITAL | Age: 58
End: 2023-12-28
Payer: MEDICAID

## 2023-12-28 LAB
ALBUMIN SERPL-MCNC: 3.9 G/DL (ref 3.5–5.2)
ALBUMIN/GLOB SERPL: 1.3 G/DL
ALP SERPL-CCNC: 134 U/L (ref 39–117)
ALT SERPL W P-5'-P-CCNC: 42 U/L (ref 1–41)
ANION GAP SERPL CALCULATED.3IONS-SCNC: 11.5 MMOL/L (ref 5–15)
AST SERPL-CCNC: 45 U/L (ref 1–40)
BASOPHILS # BLD AUTO: 0.01 10*3/MM3 (ref 0–0.2)
BASOPHILS NFR BLD AUTO: 0.3 % (ref 0–1.5)
BILIRUB SERPL-MCNC: 0.9 MG/DL (ref 0–1.2)
BUN SERPL-MCNC: 14 MG/DL (ref 6–20)
BUN/CREAT SERPL: 13.7 (ref 7–25)
CALCIUM SPEC-SCNC: 9 MG/DL (ref 8.6–10.5)
CHLORIDE SERPL-SCNC: 102 MMOL/L (ref 98–107)
CO2 SERPL-SCNC: 24.5 MMOL/L (ref 22–29)
CREAT SERPL-MCNC: 1.02 MG/DL (ref 0.76–1.27)
CRP SERPL-MCNC: 0.57 MG/DL (ref 0–0.5)
DEPRECATED RDW RBC AUTO: 45.5 FL (ref 37–54)
EGFRCR SERPLBLD CKD-EPI 2021: 85.2 ML/MIN/1.73
EOSINOPHIL # BLD AUTO: 0.07 10*3/MM3 (ref 0–0.4)
EOSINOPHIL NFR BLD AUTO: 2.1 % (ref 0.3–6.2)
ERYTHROCYTE [DISTWIDTH] IN BLOOD BY AUTOMATED COUNT: 14.9 % (ref 12.3–15.4)
GLOBULIN UR ELPH-MCNC: 2.9 GM/DL
GLUCOSE SERPL-MCNC: 174 MG/DL (ref 65–99)
HCT VFR BLD AUTO: 43 % (ref 37.5–51)
HGB BLD-MCNC: 13.9 G/DL (ref 13–17.7)
IMM GRANULOCYTES # BLD AUTO: 0 10*3/MM3 (ref 0–0.05)
IMM GRANULOCYTES NFR BLD AUTO: 0 % (ref 0–0.5)
LYMPHOCYTES # BLD AUTO: 0.59 10*3/MM3 (ref 0.7–3.1)
LYMPHOCYTES NFR BLD AUTO: 18 % (ref 19.6–45.3)
MCH RBC QN AUTO: 27.3 PG (ref 26.6–33)
MCHC RBC AUTO-ENTMCNC: 32.3 G/DL (ref 31.5–35.7)
MCV RBC AUTO: 84.5 FL (ref 79–97)
MONOCYTES # BLD AUTO: 0.22 10*3/MM3 (ref 0.1–0.9)
MONOCYTES NFR BLD AUTO: 6.7 % (ref 5–12)
NEUTROPHILS NFR BLD AUTO: 2.38 10*3/MM3 (ref 1.7–7)
NEUTROPHILS NFR BLD AUTO: 72.9 % (ref 42.7–76)
NRBC BLD AUTO-RTO: 0 /100 WBC (ref 0–0.2)
PLATELET # BLD AUTO: 52 10*3/MM3 (ref 140–450)
PMV BLD AUTO: 11.3 FL (ref 6–12)
POTASSIUM SERPL-SCNC: 4.2 MMOL/L (ref 3.5–5.2)
PROT SERPL-MCNC: 6.8 G/DL (ref 6–8.5)
RBC # BLD AUTO: 5.09 10*6/MM3 (ref 4.14–5.8)
SODIUM SERPL-SCNC: 138 MMOL/L (ref 136–145)
WBC NRBC COR # BLD AUTO: 3.27 10*3/MM3 (ref 3.4–10.8)

## 2023-12-28 PROCEDURE — 80053 COMPREHEN METABOLIC PANEL: CPT | Performed by: INTERNAL MEDICINE

## 2023-12-28 PROCEDURE — 85025 COMPLETE CBC W/AUTO DIFF WBC: CPT | Performed by: INTERNAL MEDICINE

## 2023-12-28 PROCEDURE — 86140 C-REACTIVE PROTEIN: CPT | Performed by: INTERNAL MEDICINE

## 2024-01-02 ENCOUNTER — OFFICE VISIT (OUTPATIENT)
Dept: INFECTIOUS DISEASES | Facility: CLINIC | Age: 59
End: 2024-01-02
Payer: MEDICAID

## 2024-01-02 VITALS
TEMPERATURE: 97.9 F | WEIGHT: 251.4 LBS | SYSTOLIC BLOOD PRESSURE: 158 MMHG | HEART RATE: 92 BPM | BODY MASS INDEX: 36.07 KG/M2 | OXYGEN SATURATION: 96 % | DIASTOLIC BLOOD PRESSURE: 83 MMHG

## 2024-01-02 DIAGNOSIS — K74.69 OTHER CIRRHOSIS OF LIVER: ICD-10-CM

## 2024-01-02 DIAGNOSIS — Z96.651 STATUS POST TOTAL RIGHT KNEE REPLACEMENT: Primary | ICD-10-CM

## 2024-01-02 DIAGNOSIS — R78.81 MRSA BACTEREMIA: ICD-10-CM

## 2024-01-02 DIAGNOSIS — B95.62 MRSA BACTEREMIA: ICD-10-CM

## 2024-01-02 PROCEDURE — 3077F SYST BP >= 140 MM HG: CPT | Performed by: INTERNAL MEDICINE

## 2024-01-02 PROCEDURE — 99213 OFFICE O/P EST LOW 20 MIN: CPT | Performed by: INTERNAL MEDICINE

## 2024-01-02 PROCEDURE — 3079F DIAST BP 80-89 MM HG: CPT | Performed by: INTERNAL MEDICINE

## 2024-01-02 NOTE — PROGRESS NOTES
[facility]         Referring Provider: Missy Up APRN  [address] [place] [address]    Subjective      Chief Complaint  Follow-up    History of Present Illness  Melchor Hardwick III is a 58 y.o. male who presents today to [facility] INF[facility] DISE[facility]ES for Follow Up . Melchor Hardwick III is a 58 y.o. male who presents today to [facility] INFECTI[facility] DISEASES for Follow Up . 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 No fever, no chills and no night sweats. I reviewed labs from last week and CRP is improved back to 0.57.     Pain is about the same, no fever, no chills and no erythema, no swelling of the knee. No clinical sepsis.    Past Medical History:   Diagnosis Date    Diabetes mellitus     4 times per day gets checked for sugar at rehab    Dizzy     Hyperlipidemia     Hypertension     Hypothyroid 11/30/2022    MRSA infection     blood -   2022    Orthostatic hypotension     Pressure sore on buttocks     top crack -  sees wound - but now rn take care of it at rehab - minor opening - dime size - pat nurse didnt assess-  pt states getting better    Pyogenic arthritis of right knee joint, due to unspecified organism 09/21/2022    Sepsis     Wears glasses     readers       Past Surgical History:   Procedure Laterality Date    ABSCESS DRAINAGE  2004    PERINEUM    AMPUTATION FOOT Left 05/18/2022    Procedure: AMPUTATION FOOT;  Surgeon: Addison Colby MD;  Location: [facility];  Service: Podiatry;  Laterality: Left;    INCISION AND DRAINAGE LEG Left 05/10/2022    Procedure: INCISION AND DRAINAGE LOWER EXTREMITY;  Surgeon: Addison Colby MD;  Location: [facility] OR;  Service: Podiatry;  Laterality: Left;    KNEE INCISION AND DRAINAGE Right 09/21/2022    Procedure: KNEE INCISION AND DRAINAGE RIGHT;  Surgeon: Brain Bentley MD;  [facility];  Service: Orthopedics;  Laterality: Right;     PERIPHERALLY INSERTED CENTRAL CATHETER INSERTION Left     TOTAL KNEE  PROSTHESIS REMOVAL W/ SPACER INSERTION Right 11/30/2022    Procedure: ANTIBIOTIC SPACER PLACEMENT KNEE - RIGHT;  Surgeon: Brain Bentley MD;  Location:  [facility] OR;  Service: Orthopedics;  Laterality: Right;    WOUND DEBRIDEMENT Left 05/13/2022    Procedure: DEBRIDEMENT FOOT;  Surgeon: Addison Colby MD;  Location:  [facility] OR;  Service: Podiatry;  Laterality: Left;       Social History     Socioeconomic History    Marital status: Single   Tobacco Use    Smoking status: Never    Smokeless tobacco: Never   Vaping Use    Vaping Use: Never used   Substance and Sexual Activity    Alcohol use: Never    Drug use: Never    Sexual activity: Defer       Family History  family history is not on file.      There is no immunization history on file for this patient.     Allergies  No Known Allergies    The medication list has been reviewed and updated.   Current Medications    Current Outpatient Medications:     acetaminophen (TYLENOL) 325 MG tablet, Take 2 tablets by mouth Every 4 (Four) Hours As Needed for Mild Pain., Disp: , Rfl:     ascorbic acid (VITAMIN C) 500 MG tablet, Take 1 tablet by mouth Daily., Disp: , Rfl:     bisacodyl (DULCOLAX) 10 MG suppository, Insert 1 suppository into the rectum Daily As Needed for Constipation., Disp: , Rfl:     cyclobenzaprine (FLEXERIL) 5 MG tablet, Take 1 tablet by mouth 3 (Three) Times a Day As Needed for Muscle Spasms., Disp: , Rfl:     Diclofenac Sodium (VOLTAREN) 1 % gel gel, Apply 4 g topically to the appropriate area as directed 4 (Four) Times a Day As Needed., Disp: , Rfl:     docusate sodium 100 MG capsule, Take 1 capsule by mouth 2 (Two) Times a Day., Disp: , Rfl:     doxycycline (VIBRAMYICN) 100 MG tablet, Take 1 tablet by mouth Daily., Disp: , Rfl:     fludrocortisone 0.1 MG tablet, Take 0.5 tablets by mouth Daily., Disp: , Rfl:     Insulin Aspart (novoLOG) 100 UNIT/ML injection, Inject 0-14 Units under the  skin into the appropriate area as directed 3 (Three) Times a Day Before Meals., Disp: , Rfl: 12    Insulin Aspart (novoLOG) 100 UNIT/ML injection, Inject 4 Units under the skin into the appropriate area as directed 3 (Three) Times a Day Before Meals., Disp: , Rfl: 12    insulin detemir (LEVEMIR) 100 UNIT/ML injection, Inject 17 Units under the skin into the appropriate area as directed Every 12 (Twelve) Hours., Disp: , Rfl: 12    levothyroxine (SYNTHROID, LEVOTHROID) 50 MCG tablet, Take 1 tablet by mouth Daily., Disp: , Rfl:     meloxicam (MOBIC) 15 MG tablet, Take 1 tablet by mouth Daily. (Patient not taking: Reported on 7/11/2023), Disp: , Rfl:     midodrine (PROAMATINE) 2.5 MG tablet, Take 1 tablet by mouth 3 (Three) Times a Day Before Meals. Hold if SBP is above 120 (Patient not taking: Reported on 7/11/2023), Disp: , Rfl:     multivitamin with minerals tablet tablet, Take 1 tablet by mouth Daily., Disp: , Rfl:     pantoprazole (PROTONIX) 40 MG EC tablet, Take 1 tablet by mouth Every Morning., Disp: , Rfl:     polyethylene glycol (MIRALAX) 17 g packet, Take 17 g by mouth Daily., Disp: , Rfl:     pregabalin (LYRICA) 100 MG capsule, Take 1 capsule by mouth Every 12 (Twelve) Hours. (Patient taking differently: Take 1 capsule by mouth Every 12 (Twelve) Hours. Pt states he is taking 25 mg), Disp: , Rfl:     Vitamins A & D (magic barrier cream), Apply 1 application topically to the appropriate area as directed As Needed (skin irritation)., Disp: , Rfl:       Review of Systems    Review of Systems   Constitutional:  Negative for activity change, appetite change, chills, diaphoresis and fever.   HENT:  Negative for congestion, dental problem, drooling, mouth sores, sinus pressure and sneezing.    Eyes:  Negative for blurred vision, double vision, photophobia and discharge.   Respiratory:  Negative for apnea, cough, choking, chest tightness, shortness of breath and wheezing.    Cardiovascular:  Negative for chest pain,  "palpitations and leg swelling.   Gastrointestinal:  Negative for abdominal distention, abdominal pain, diarrhea, nausea and vomiting.   Genitourinary:  Negative for dysuria, flank pain and frequency.   Musculoskeletal:  Negative for arthralgias, gait problem, neck pain and neck stiffness.   Skin:  Negative for rash.   Neurological:  Negative for dizziness, tremors, seizures, syncope, speech difficulty, numbness, headache and confusion.   Psychiatric/Behavioral:  Negative for agitation, behavioral problems, hallucinations and suicidal ideas.         Objective     Vital Signs:  /83 (BP Location: Right arm, Patient Position: Sitting, Cuff Size: Adult)   Pulse 92   Temp 97.9 °F (36.6 °C) (Temporal)   Wt 114 kg (251 lb 6.4 oz)   SpO2 96%   BMI 36.07 kg/m²   Estimated body mass index is 36.07 kg/m² as calculated from the following:    Height as of 12/5/23: 177.8 cm (70\").    Weight as of this encounter: 114 kg (251 lb 6.4 oz).    Physical Exam  Constitutional:       General: He is not in acute distress.     Appearance: Normal appearance. He is normal weight. He is not ill-appearing, toxic-appearing or diaphoretic.   HENT:      Head: Normocephalic and atraumatic.      Nose: Nose normal. No congestion or rhinorrhea.      Mouth/Throat:      Mouth: Mucous membranes are moist.      Pharynx: Oropharynx is clear.   Eyes:      General: No scleral icterus.     Extraocular Movements: Extraocular movements intact.      Pupils: Pupils are equal, round, and reactive to light.   Neck:      Vascular: No carotid bruit.   Cardiovascular:      Rate and Rhythm: Normal rate and regular rhythm.      Pulses: Normal pulses.      Heart sounds: No murmur heard.  Pulmonary:      Effort: Pulmonary effort is normal. No respiratory distress.      Breath sounds: Normal breath sounds. No stridor. No wheezing, rhonchi or rales.   Chest:      Chest wall: No tenderness.   Abdominal:      General: Abdomen is flat. Bowel sounds are normal. There " "is no distension.      Palpations: Abdomen is soft.      Tenderness: There is no abdominal tenderness. There is no guarding or rebound.   Musculoskeletal:         General: Swelling present.      Cervical back: Normal range of motion and neck supple. No rigidity or tenderness.      Comments: Knee with no erythema, no warmth   Lymphadenopathy:      Cervical: No cervical adenopathy.   Skin:     Coloration: Skin is not jaundiced.      Findings: No lesion or rash.   Neurological:      General: No focal deficit present.      Mental Status: He is alert and oriented to person, place, and time.   Psychiatric:         Mood and Affect: Mood normal.         Behavior: Behavior normal.          Result Review :  The following data was reviewed by Marilynn Giordano MD     Lab Results  Lab Results   Component Value Date    WBC 3.27 (L) 12/28/2023    HGB 13.9 12/28/2023    HCT 43.0 12/28/2023    MCV 84.5 12/28/2023    PLT 52 (L) 12/28/2023     Lab Results   Component Value Date    GLUCOSE 174 (H) 12/28/2023    BUN 14 12/28/2023    CREATININE 1.02 12/28/2023    BCR 13.7 12/28/2023    K 4.2 12/28/2023    CO2 24.5 12/28/2023    CALCIUM 9.0 12/28/2023    ALBUMIN 3.9 12/28/2023    AST 45 (H) 12/28/2023    ALT 42 (H) 12/28/2023      Lab Results   Component Value Date    CRP 0.57 (H) 12/28/2023        No results found for: \"ACANTHNAEG\", \"AFBCX\", \"BPERTUSSISCX\", \"BLOODCX\"  No results found for: \"BCIDPCR\", \"CXREFLEX\", \"CSFCX\", \"CULTURETIS\"  No results found for: \"CULTURES\", \"HSVCX\", \"URCX\"  No results found for: \"EYECULTURE\", \"GCCX\", \"HSVCULTURE\", \"LABHSV\"  No results found for: \"LEGIONELLA\", \"MRSACX\", \"MUMPSCX\", \"MYCOPLASCX\"  No results found for: \"NOCARDIACX\", \"STOOLCX\"  No results found for: \"THROATCX\", \"UNSTIMCULT\", \"URINECX\", \"CULTURE\", \"VZVCULTUR\"  No results found for: \"VIRALCULTU\", \"WOUNDCX\"    Radiology Results              Assessment / Plan        Diagnoses and all orders for this visit:    1. Status post total right knee " replacement (Primary)    2. MRSA bacteremia    3. Other cirrhosis of liver    Labs with improving CRP level at 0.57 with no evidence of clinical sepsis to be concerned about.     Patient is doing great with no complaints and no evidence of clinical sepsis. Will follow closely.     Continue with oral lifelong suppressive therapy with doxycycline 100 mg daily that was decreased from bid on 6/6/23.     Will check blood work on 2/16//23 and follow up with me on 2/20/23.            Follow Up   Return in about 7 weeks (around 2/20/2024) for Follow up, With Dr. Giordano.    Visit Diagnoses:    ICD-10-CM ICD-9-CM   1. Status post total right knee replacement  Z96.651 V43.65   2. MRSA bacteremia  R78.81 790.7    B95.62 041.12   3. Other cirrhosis of liver  K74.69 571.5       Patient was given instructions and counseling regarding his condition or for health maintenance advice. Please see specific information pulled into the AVS if appropriate.     This document has been electronically signed by Marilynn Giordano MD   January 2, 2024 08:24 EST    Dictated Utilizing Dragon Dictation: Part of this note may be an electronic transcription/translation of spoken language to printed text using the Dragon Dictation System.

## 2024-02-15 ENCOUNTER — LAB (OUTPATIENT)
Dept: LAB | Facility: HOSPITAL | Age: 59
End: 2024-02-15
Payer: MEDICAID

## 2024-02-15 DIAGNOSIS — B95.62 MRSA BACTEREMIA: ICD-10-CM

## 2024-02-15 DIAGNOSIS — R78.81 MRSA BACTEREMIA: ICD-10-CM

## 2024-02-15 DIAGNOSIS — B95.62 MRSA BACTEREMIA: Primary | ICD-10-CM

## 2024-02-15 DIAGNOSIS — R78.81 MRSA BACTEREMIA: Primary | ICD-10-CM

## 2024-02-15 LAB
BASOPHILS # BLD AUTO: 0.02 10*3/MM3 (ref 0–0.2)
BASOPHILS NFR BLD AUTO: 0.6 % (ref 0–1.5)
CRP SERPL-MCNC: 0.55 MG/DL (ref 0–0.5)
DEPRECATED RDW RBC AUTO: 45.5 FL (ref 37–54)
EOSINOPHIL # BLD AUTO: 0.08 10*3/MM3 (ref 0–0.4)
EOSINOPHIL NFR BLD AUTO: 2.4 % (ref 0.3–6.2)
ERYTHROCYTE [DISTWIDTH] IN BLOOD BY AUTOMATED COUNT: 14.6 % (ref 12.3–15.4)
HCT VFR BLD AUTO: 43.9 % (ref 37.5–51)
HGB BLD-MCNC: 14.2 G/DL (ref 13–17.7)
IMM GRANULOCYTES # BLD AUTO: 0.01 10*3/MM3 (ref 0–0.05)
IMM GRANULOCYTES NFR BLD AUTO: 0.3 % (ref 0–0.5)
LYMPHOCYTES # BLD AUTO: 0.67 10*3/MM3 (ref 0.7–3.1)
LYMPHOCYTES NFR BLD AUTO: 19.8 % (ref 19.6–45.3)
MCH RBC QN AUTO: 27.6 PG (ref 26.6–33)
MCHC RBC AUTO-ENTMCNC: 32.3 G/DL (ref 31.5–35.7)
MCV RBC AUTO: 85.4 FL (ref 79–97)
MONOCYTES # BLD AUTO: 0.17 10*3/MM3 (ref 0.1–0.9)
MONOCYTES NFR BLD AUTO: 5 % (ref 5–12)
NEUTROPHILS NFR BLD AUTO: 2.43 10*3/MM3 (ref 1.7–7)
NEUTROPHILS NFR BLD AUTO: 71.9 % (ref 42.7–76)
NRBC BLD AUTO-RTO: 0 /100 WBC (ref 0–0.2)
PLATELET # BLD AUTO: 54 10*3/MM3 (ref 140–450)
PMV BLD AUTO: 10.8 FL (ref 6–12)
RBC # BLD AUTO: 5.14 10*6/MM3 (ref 4.14–5.8)
WBC NRBC COR # BLD AUTO: 3.38 10*3/MM3 (ref 3.4–10.8)

## 2024-02-15 PROCEDURE — 86140 C-REACTIVE PROTEIN: CPT

## 2024-02-15 PROCEDURE — 87040 BLOOD CULTURE FOR BACTERIA: CPT

## 2024-02-15 PROCEDURE — 36415 COLL VENOUS BLD VENIPUNCTURE: CPT

## 2024-02-15 PROCEDURE — 85025 COMPLETE CBC W/AUTO DIFF WBC: CPT

## 2024-02-20 ENCOUNTER — OFFICE VISIT (OUTPATIENT)
Dept: INFECTIOUS DISEASES | Facility: CLINIC | Age: 59
End: 2024-02-20
Payer: MEDICAID

## 2024-02-20 VITALS
BODY MASS INDEX: 35.41 KG/M2 | DIASTOLIC BLOOD PRESSURE: 76 MMHG | HEART RATE: 91 BPM | WEIGHT: 246.8 LBS | OXYGEN SATURATION: 97 % | SYSTOLIC BLOOD PRESSURE: 175 MMHG

## 2024-02-20 DIAGNOSIS — M00.061 STAPHYLOCOCCAL ARTHRITIS OF RIGHT KNEE: ICD-10-CM

## 2024-02-20 DIAGNOSIS — B95.62 MRSA BACTEREMIA: Primary | ICD-10-CM

## 2024-02-20 DIAGNOSIS — R78.81 MRSA BACTEREMIA: Primary | ICD-10-CM

## 2024-02-20 DIAGNOSIS — Z96.651 STATUS POST TOTAL RIGHT KNEE REPLACEMENT: ICD-10-CM

## 2024-02-20 PROBLEM — S31.819A WOUND OF RIGHT BUTTOCK: Status: RESOLVED | Noted: 2022-10-05 | Resolved: 2024-02-20

## 2024-02-20 PROBLEM — G72.81 CRITICAL ILLNESS MYOPATHY: Status: RESOLVED | Noted: 2022-06-02 | Resolved: 2024-02-20

## 2024-02-20 PROBLEM — G89.29 CHRONIC LOW BACK PAIN: Status: RESOLVED | Noted: 2022-12-10 | Resolved: 2024-02-20

## 2024-02-20 PROBLEM — Z87.39 HISTORY OF OSTEOMYELITIS: Status: RESOLVED | Noted: 2022-10-05 | Resolved: 2024-02-20

## 2024-02-20 PROBLEM — A49.02 MRSA (METHICILLIN RESISTANT STAPHYLOCOCCUS AUREUS): Status: RESOLVED | Noted: 2022-12-10 | Resolved: 2024-02-20

## 2024-02-20 PROBLEM — M86.9 OSTEOMYELITIS: Status: RESOLVED | Noted: 2022-05-11 | Resolved: 2024-02-20

## 2024-02-20 PROBLEM — L03.119 CELLULITIS IN DIABETIC FOOT: Status: RESOLVED | Noted: 2022-05-10 | Resolved: 2024-02-20

## 2024-02-20 PROBLEM — Z45.2 PICC (PERIPHERALLY INSERTED CENTRAL CATHETER) IN PLACE: Status: RESOLVED | Noted: 2022-12-10 | Resolved: 2024-02-20

## 2024-02-20 PROBLEM — M86.172 OTHER ACUTE OSTEOMYELITIS OF LEFT FOOT: Status: RESOLVED | Noted: 2022-05-10 | Resolved: 2024-02-20

## 2024-02-20 PROBLEM — M54.50 CHRONIC LOW BACK PAIN: Status: RESOLVED | Noted: 2022-12-10 | Resolved: 2024-02-20

## 2024-02-20 PROBLEM — E11.628 CELLULITIS IN DIABETIC FOOT: Status: RESOLVED | Noted: 2022-05-10 | Resolved: 2024-02-20

## 2024-02-20 PROBLEM — A48.0 GAS GANGRENE OF FOOT: Status: RESOLVED | Noted: 2022-05-10 | Resolved: 2024-02-20

## 2024-02-20 PROBLEM — M00.9 INFECTION OF RIGHT KNEE: Status: RESOLVED | Noted: 2022-11-30 | Resolved: 2024-02-20

## 2024-02-20 PROBLEM — R53.81 DEBILITY: Status: RESOLVED | Noted: 2022-10-28 | Resolved: 2024-02-20

## 2024-02-20 LAB
BACTERIA SPEC AEROBE CULT: NORMAL
BACTERIA SPEC AEROBE CULT: NORMAL

## 2024-02-20 PROCEDURE — 3077F SYST BP >= 140 MM HG: CPT | Performed by: INTERNAL MEDICINE

## 2024-02-20 PROCEDURE — 99214 OFFICE O/P EST MOD 30 MIN: CPT | Performed by: INTERNAL MEDICINE

## 2024-02-20 PROCEDURE — 3078F DIAST BP <80 MM HG: CPT | Performed by: INTERNAL MEDICINE

## 2024-02-20 NOTE — PROGRESS NOTES
[facility]         Referring Provider: No referring provider defined for this encounter.    Subjective      Chief Complaint  Follow-up    History of Present Illness  Melchor Hardwick III is a 58 y.o. male who presents today to [facility] for Follow Up . 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 No fever, no chills and no night sweats. I reviewed labs from last week and CRP is improved back to 0.57.     Pain is about the same, no fever, no chills and no erythema, no swelling of the knee. No clinical sepsis.    Past Medical History:   Diagnosis Date    Diabetes mellitus     4 times per day gets checked for sugar at rehab    Dizzy     Hyperlipidemia     Hypertension     Hypothyroid 11/30/2022    MRSA infection     blood -   2022    Orthostatic hypotension     Pressure sore on buttocks     top crack -  sees wound - but now rn take care of it at rehab - minor opening - dime size - pat nurse didnt assess-  pt states getting better    Pyogenic arthritis of right knee joint, due to unspecified organism 09/21/2022    Sepsis     Wears glasses     readers       Past Surgical History:   Procedure Laterality Date    ABSCESS DRAINAGE  2004    PERINEUM    AMPUTATION FOOT Left 05/18/2022    Procedure: AMPUTATION FOOT;  Surgeon: Addison Colby MD;  Location: [facility] OR;  Service: Podiatry;  Laterality: Left;    INCISION AND DRAINAGE LEG Left 05/10/2022    Procedure: INCISION AND DRAINAGE LOWER EXTREMITY;  Surgeon: Addison Colby MD;  Location: [facility] OR;  Service: Podiatry;  Laterality: Left;    KNEE INCISION AND DRAINAGE Right 09/21/2022    Procedure: KNEE INCISION AND DRAINAGE RIGHT;  Surgeon: Brain Bentley MD;  Location: [facility] OR;  Service: Orthopedics;  Laterality: Right;    PERIPHERALLY INSERTED CENTRAL CATHETER INSERTION Left     TOTAL KNEE  PROSTHESIS REMOVAL W/ SPACER INSERTION Right 11/30/2022     Procedure: ANTIBIOTIC SPACER PLACEMENT KNEE - RIGHT;  Surgeon: Brain Bentley MD;  Location:  [facility] OR;  Service: Orthopedics;  Laterality: Right;    WOUND DEBRIDEMENT Left 05/13/2022    Procedure: DEBRIDEMENT FOOT;  Surgeon: Addison Colby MD;  Location:  [facility] OR;  Service: Podiatry;  Laterality: Left;       Social History     Socioeconomic History    Marital status: Single   Tobacco Use    Smoking status: Never    Smokeless tobacco: Never   Vaping Use    Vaping Use: Never used   Substance and Sexual Activity    Alcohol use: Never    Drug use: Never    Sexual activity: Defer       Family History  family history is not on file.      There is no immunization history on file for this patient.     Allergies  No Known Allergies    The medication list has been reviewed and updated.   Current Medications    Current Outpatient Medications:     acetaminophen (TYLENOL) 325 MG tablet, Take 2 tablets by mouth Every 4 (Four) Hours As Needed for Mild Pain., Disp: , Rfl:     ascorbic acid (VITAMIN C) 500 MG tablet, Take 1 tablet by mouth Daily., Disp: , Rfl:     bisacodyl (DULCOLAX) 10 MG suppository, Insert 1 suppository into the rectum Daily As Needed for Constipation., Disp: , Rfl:     cyclobenzaprine (FLEXERIL) 5 MG tablet, Take 1 tablet by mouth 3 (Three) Times a Day As Needed for Muscle Spasms., Disp: , Rfl:     Diclofenac Sodium (VOLTAREN) 1 % gel gel, Apply 4 g topically to the appropriate area as directed 4 (Four) Times a Day As Needed., Disp: , Rfl:     docusate sodium 100 MG capsule, Take 1 capsule by mouth 2 (Two) Times a Day., Disp: , Rfl:     doxycycline (VIBRAMYICN) 100 MG tablet, Take 1 tablet by mouth Daily., Disp: , Rfl:     fludrocortisone 0.1 MG tablet, Take 0.5 tablets by mouth Daily., Disp: , Rfl:     Insulin Aspart (novoLOG) 100 UNIT/ML injection, Inject 0-14 Units under the skin into the appropriate area as directed 3 (Three) Times a Day Before Meals., Disp: , Rfl: 12    Insulin Aspart (novoLOG) 100  UNIT/ML injection, Inject 4 Units under the skin into the appropriate area as directed 3 (Three) Times a Day Before Meals., Disp: , Rfl: 12    insulin detemir (LEVEMIR) 100 UNIT/ML injection, Inject 17 Units under the skin into the appropriate area as directed Every 12 (Twelve) Hours., Disp: , Rfl: 12    levothyroxine (SYNTHROID, LEVOTHROID) 50 MCG tablet, Take 1 tablet by mouth Daily., Disp: , Rfl:     meloxicam (MOBIC) 15 MG tablet, Take 1 tablet by mouth Daily. (Patient not taking: Reported on 7/11/2023), Disp: , Rfl:     midodrine (PROAMATINE) 2.5 MG tablet, Take 1 tablet by mouth 3 (Three) Times a Day Before Meals. Hold if SBP is above 120 (Patient not taking: Reported on 7/11/2023), Disp: , Rfl:     multivitamin with minerals tablet tablet, Take 1 tablet by mouth Daily., Disp: , Rfl:     pantoprazole (PROTONIX) 40 MG EC tablet, Take 1 tablet by mouth Every Morning., Disp: , Rfl:     polyethylene glycol (MIRALAX) 17 g packet, Take 17 g by mouth Daily., Disp: , Rfl:     pregabalin (LYRICA) 100 MG capsule, Take 1 capsule by mouth Every 12 (Twelve) Hours. (Patient taking differently: Take 1 capsule by mouth Every 12 (Twelve) Hours. Pt states he is taking 25 mg), Disp: , Rfl:     Vitamins A & D (magic barrier cream), Apply 1 application topically to the appropriate area as directed As Needed (skin irritation)., Disp: , Rfl:       Review of Systems    Review of Systems   Constitutional:  Negative for activity change, appetite change, chills, diaphoresis and fever.   HENT:  Negative for congestion, dental problem, drooling, mouth sores, sinus pressure and sneezing.    Eyes:  Negative for blurred vision, double vision, photophobia and discharge.   Respiratory:  Negative for apnea, cough, choking, chest tightness, shortness of breath and wheezing.    Cardiovascular:  Negative for chest pain, palpitations and leg swelling.   Gastrointestinal:  Negative for abdominal distention, abdominal pain, diarrhea, nausea and  "vomiting.   Genitourinary:  Negative for dysuria, flank pain and frequency.   Musculoskeletal:  Positive for arthralgias, gait problem and joint swelling. Negative for neck pain and neck stiffness.   Skin:  Negative for rash.   Neurological:  Negative for dizziness, tremors, seizures, syncope, speech difficulty, numbness, headache and confusion.   Psychiatric/Behavioral:  Negative for agitation, behavioral problems, hallucinations and suicidal ideas.         Objective     Vital Signs:  /76 (BP Location: Right arm, Patient Position: Sitting, Cuff Size: Adult)   Pulse 91   Wt 112 kg (246 lb 12.8 oz)   SpO2 97%   BMI 35.41 kg/m²   Estimated body mass index is 35.41 kg/m² as calculated from the following:    Height as of 12/5/23: 177.8 cm (70\").    Weight as of this encounter: 112 kg (246 lb 12.8 oz).    Physical Exam  Constitutional:       General: He is not in acute distress.     Appearance: Normal appearance. He is normal weight. He is not ill-appearing, toxic-appearing or diaphoretic.   HENT:      Head: Normocephalic and atraumatic.      Nose: Nose normal. No congestion or rhinorrhea.      Mouth/Throat:      Mouth: Mucous membranes are moist.      Pharynx: Oropharynx is clear.   Eyes:      General: No scleral icterus.     Extraocular Movements: Extraocular movements intact.      Pupils: Pupils are equal, round, and reactive to light.   Neck:      Vascular: No carotid bruit.   Cardiovascular:      Rate and Rhythm: Normal rate and regular rhythm.      Pulses: Normal pulses.      Heart sounds: No murmur heard.  Pulmonary:      Effort: Pulmonary effort is normal. No respiratory distress.      Breath sounds: Normal breath sounds. No stridor. No wheezing, rhonchi or rales.   Chest:      Chest wall: No tenderness.   Abdominal:      General: Abdomen is flat. Bowel sounds are normal. There is no distension.      Palpations: Abdomen is soft.      Tenderness: There is no abdominal tenderness. There is no guarding or " "rebound.   Musculoskeletal:      Cervical back: Normal range of motion and neck supple. No rigidity or tenderness.   Lymphadenopathy:      Cervical: No cervical adenopathy.   Skin:     Coloration: Skin is not jaundiced.      Findings: No lesion or rash.   Neurological:      General: No focal deficit present.      Mental Status: He is alert and oriented to person, place, and time.   Psychiatric:         Mood and Affect: Mood normal.         Behavior: Behavior normal.          Result Review :  The following data was reviewed by Marilynn Giordano MD     Lab Results  Lab Results   Component Value Date    WBC 3.38 (L) 02/15/2024    HGB 14.2 02/15/2024    HCT 43.9 02/15/2024    MCV 85.4 02/15/2024    PLT 54 (L) 02/15/2024     Lab Results   Component Value Date    GLUCOSE 174 (H) 12/28/2023    BUN 14 12/28/2023    CREATININE 1.02 12/28/2023    BCR 13.7 12/28/2023    K 4.2 12/28/2023    CO2 24.5 12/28/2023    CALCIUM 9.0 12/28/2023    ALBUMIN 3.9 12/28/2023    AST 45 (H) 12/28/2023    ALT 42 (H) 12/28/2023      Lab Results   Component Value Date    CRP 0.55 (H) 02/15/2024        Blood Culture   Date Value Ref Range Status   02/15/2024 No growth at 4 days  Preliminary     No results found for: \"BCIDPCR\", \"CXREFLEX\", \"CSFCX\", \"CULTURETIS\"  No results found for: \"CULTURES\", \"HSVCX\", \"URCX\"  No results found for: \"EYECULTURE\", \"GCCX\", \"HSVCULTURE\", \"LABHSV\"  No results found for: \"LEGIONELLA\", \"MRSACX\", \"MUMPSCX\", \"MYCOPLASCX\"  No results found for: \"NOCARDIACX\", \"STOOLCX\"  No results found for: \"THROATCX\", \"UNSTIMCULT\", \"URINECX\", \"CULTURE\", \"VZVCULTUR\"  No results found for: \"VIRALCULTU\", \"WOUNDCX\"    Radiology Results              Assessment / Plan        Diagnoses and all orders for this visit:    1. MRSA bacteremia (Primary)    2. Status post total right knee replacement    3. Staphylococcal arthritis of right knee        Labs with improving CRP level at 0.55 with no evidence of clinical sepsis to be concerned " about.    Leukopenia is stable at 3.38 and thrombocytopenia stable at 54     Patient is doing great with no complaints and no evidence of clinical sepsis. Will follow closely.     Continue with oral lifelong suppressive therapy with doxycycline 100 mg daily that was decreased from bid on 6/6/23.     Blood cultures were negative x 2 sets.    Will check labs 3/29/24 and follow up with me on 4/2/24.    Consider compression stockings to the right lower extremity.        Follow Up   Return in about 6 weeks (around 4/2/2024) for Follow up, With Dr. Giordano.    Visit Diagnoses:    ICD-10-CM ICD-9-CM   1. MRSA bacteremia  R78.81 790.7    B95.62 041.12   2. Status post total right knee replacement  Z96.651 V43.65   3. Staphylococcal arthritis of right knee  M00.061 711.06     041.10       Patient was given instructions and counseling regarding his condition or for health maintenance advice. Please see specific information pulled into the AVS if appropriate.     This document has been electronically signed by Marilynn Giordano MD   February 20, 2024 08:47 EST    Dictated Utilizing Dragon Dictation: Part of this note may be an electronic transcription/translation of spoken language to printed text using the Dragon Dictation System.

## 2024-03-27 NOTE — PROGRESS NOTES
[facility]  PROGRESS NOTE    Patient Name: Melchor Hardwick III  : 1965  MRN: [id number]    Date of Admission: 2022  Primary Care Physician: Missy Up APRN    Subjective   Subjective     CC:  F/u right knee pain    HPI:   Butt is sore and hard to sit with tear. Able to walk some. Has minimal pain in RLE with weight bearing. No other complaints    ROS:   Gen- No fevers, chills  CV- No chest pain, palpitations  Resp- No cough, dyspnea  GI- No N/V/D, abd pain    Objective   Objective     Vital Signs:   Temp:  [97.8 °F (36.6 °C)-99.1 °F (37.3 °C)] 98.1 °F (36.7 °C)  Heart Rate:  [66-83] 72  Resp:  [15-17] 15  BP: (113-126)/(56-64) 119/58     Physical Exam:   Constitutional: No acute distress, awake, alert  HENT: NCAT, mucous membranes moist  Respiratory: Clear to auscultation bilaterally, respiratory effort normal, room air  Cardiovascular: RRR, no murmurs, rubs, or gallops  Gastrointestinal: Positive bowel sounds, soft, nontender, nondistended  Musculoskeletal: Knee immobilizer right leg, partial old left foot amputation  Psychiatric: Appropriate affect, cooperative  Neurologic: Oriented x 3, moves all extremities freely, speech clear  Skin: No rashes  No change in exam from 22    Results Reviewed:  LAB RESULTS:      Lab 22  1034 22  0412 22  1030   WBC 6.09 6.67 6.20   HEMOGLOBIN 8.6* 8.2* 7.9*   HEMATOCRIT 26.9* 25.3* 24.7*   PLATELETS 199 158 123*   MCV 77.7* 76.4* 77.2*         Lab 22  1034 22  0412 22  1030 22  2211   SODIUM 130* 135* 136  --    POTASSIUM 4.6 4.5 4.4 4.1   CHLORIDE 96* 98 99  --    CO2 29.0 29.0 31.0*  --    ANION GAP 5.0 8.0 6.0  --    BUN 11 12 13  --    CREATININE 0.59* 0.62* 0.61*  --    EGFR 113.2 111.5 112.0  --    GLUCOSE 232* 129* 141*  --    CALCIUM 8.2* 8.0* 7.6*  --          Lab 22  1034 22  0412   TOTAL PROTEIN 5.6* 5.1*   ALBUMIN 2.40* 2.20*   GLOBULIN 3.2 2.9   ALT  (SGPT) 34 19   AST (SGOT) 61* 34   BILIRUBIN 0.8 0.6   ALK PHOS 304* 203*                     Brief Urine Lab Results  (Last result in the past 365 days)      Color   Clarity   Blood   Leuk Est   Nitrite   Protein   CREAT   Urine HCG        09/21/22 0141 Yellow   Cloudy   Negative   Negative   Negative   Trace                 Microbiology Results Abnormal     Procedure Component Value - Date/Time    Anaerobic Culture 10 Day Incubation - Tissue, Knee, Right [[id number]] Collected: 09/21/22 1543    Lab Status: Final result Specimen: Tissue from Knee, Right Updated: 10/01/22 0648     Anaerobic Culture No anaerobes isolated at 10 days    Anaerobic Culture 10 Day Incubation - Tissue, Knee, Right [[id number]] Collected: 09/21/22 1543    Lab Status: Final result Specimen: Tissue from Knee, Right Updated: 10/01/22 0648     Anaerobic Culture No anaerobes isolated at 10 days    Anaerobic Culture 10 Day Incubation - Synovium, Knee, Right [[id number]] Collected: 09/21/22 1541    Lab Status: Final result Specimen: Synovium from Knee, Right Updated: 10/01/22 0648     Anaerobic Culture No anaerobes isolated at 10 days    Blood Culture - Blood, Arm, Left [[id number]]  (Normal) Collected: 09/26/22 1126    Lab Status: Preliminary result Specimen: Blood from Arm, Left Updated: 09/30/22 1232     Blood Culture No growth at 4 days    Blood Culture - Blood, Leg, Left [[id number]]  (Normal) Collected: 09/26/22 1126    Lab Status: Preliminary result Specimen: Blood from Leg, Left Updated: 09/30/22 1232     Blood Culture No growth at 4 days    Fungus Culture - Synovium, Knee, Right [[id number]] Collected: 09/21/22 1541    Lab Status: Preliminary result Specimen: Synovium from Knee, Right Updated: 09/28/22 1748     Fungus Culture No fungus isolated at 1 week    Fungus Culture - Tissue, Knee, Right [[id number]] Collected: 09/21/22 1543    Lab Status: Preliminary result Specimen: Tissue from Knee, Right Updated: 09/28/22 1748     Fungus Culture No  fungus isolated at 1 week    Fungus Culture - Tissue, Knee, Right [[id number]] Collected: 09/21/22 1543    Lab Status: Preliminary result Specimen: Tissue from Knee, Right Updated: 09/28/22 1748     Fungus Culture No fungus isolated at 1 week    AFB Culture - Synovium, Knee, Right [[id number]] Collected: 09/21/22 1541    Lab Status: Preliminary result Specimen: Synovium from Knee, Right Updated: 09/28/22 1748     AFB Culture No AFB isolated at 1 week     AFB Stain No acid fast bacilli seen on concentrated smear          No radiology results from the last 24 hrs    Results for orders placed during the hospital encounter of 09/20/22    Adult Transesophageal Echo (TYRESE) W/ Cont if Necessary Per Protocol    Interpretation Summary  · Normal left ventricular cavity size and wall thickness noted. All left ventricular wall segments contract normally. Estimated EF 60%.  · Normal RV structure and function  · Normal mitral valve with no significant stenosis or regurgitation  · Normal aortic valve with no significant stenosis or regurgitation, valve is trileaflet.  · Tricuspid valve has mild regurgitation, no evidence of vegetation on this exam  · Pulmonic valve is normal with no significant regurgitation seen in normal leaflet opening.  · No source of intracardiac infection identified. If there remains high clinical suspicion for infective endocarditis consider repeat study in 4 to 5 days.      I have reviewed the medications:  Scheduled Meds:acetaminophen, 1,000 mg, Oral, Q8H  aspirin, 81 mg, Oral, Q12H  DAPTOmycin, 8 mg/kg (Adjusted), Intravenous, Q24H  furosemide, 40 mg, Oral, BID  insulin detemir, 20 Units, Subcutaneous, Q12H  insulin lispro, 0-9 Units, Subcutaneous, TID AC  Insulin Lispro, 18 Units, Subcutaneous, TID With Meals  multivitamin, 1 tablet, Oral, Daily  nadolol, 20 mg, Oral, Q24H  potassium chloride, 20 mEq, Oral, BID With Meals  senna-docusate sodium, 2 tablet, Oral, BID  sodium chloride, 10 mL, Intravenous,  Q12H  spironolactone, 50 mg, Oral, Daily  traZODone, 50 mg, Oral, Nightly      Continuous Infusions:ropivacaine,       PRN Meds:.•  acetaminophen **OR** acetaminophen **OR** acetaminophen  •  senna-docusate sodium **AND** polyethylene glycol **AND** bisacodyl **AND** bisacodyl  •  dextrose  •  dextrose  •  diphenhydrAMINE **OR** diphenhydrAMINE  •  glucagon (human recombinant)  •  HYDROmorphone **AND** naloxone  •  hydrOXYzine  •  magnesium sulfate **OR** magnesium sulfate **OR** magnesium sulfate  •  naloxone  •  ondansetron **OR** ondansetron  •  oxyCODONE  •  oxyCODONE  •  potassium chloride **OR** potassium chloride **OR** potassium chloride  •  sodium chloride  •  sodium chloride    Assessment & Plan   Assessment & Plan     Active Hospital Problems    Diagnosis  POA   • **Pyogenic arthritis of right knee joint, due to unspecified organism (HCC) [M00.9]  Yes   • MRSA bacteremia [R78.81, B95.62]  Yes   • 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        Brief Hospital Course to date:  Melchor Hardwick III is a 57 y.o. male with past medical history of insulin-dependent type 2 diabetes, recurrent cellulitis/osteomyelitis of the lower extremities with recent amputation to the left foot in May 2022, recurrent infections with MRSA, PAZ cirrhosis, history of gas gangrene, hypertension, hyperlipidemia, and obesity who presents to the ER with complaints of right knee pain and swelling. Knee aspirated in the ER and was concerning for septic arthritis with over 50,000 WBC. Admitted for further management. He underwent I&D with Dr. Bentley on 9/22/22, gross purulence noted. Both surgical and blood cultures have grown MRSA. TTE revealed a possible tricuspid valve vegetation.  CT Surgery consulted for TYRESE, which did not show vegetation. CTS has signed off. Infectious Disease is following.  Planning for 6 wks of IV daptomycin with TTE in 2-4 weeks.     This patient's problems and plans were partially entered by my partner and updated as appropriate by me 10/01/22.    MRSA bacteremia  Tricuspid valve endocarditis  Septic arthritis right knee  --s/p knee aspiration in ER and Dr. Bentley performed I&D 9/22/22, gross purulence noted. Cultures with MRSA.   --9/20/22 blood cultures growing MRSA. Repeat BC no growth x 4 days  --Echo 9/23/22 revealed a 1 x 0.4 cm mobile echodensity on the tricuspid valve concerning for vegetation. TYRESE on 9/26 did not show vegetation. CTS signed off  -IV daptomycin x 6 weeks, Dr. Euceda following. Repeat TTE in 2-4 weeks     Hypokalemia  Hypomagenesemia  --Continue to replace per protocol.  --scheduled potassium  Bid  - am BMP for K+ check given patient on aldactone    Anemia  Thrombocytopenia, resolved  --hold meloxicam  --Hgb dropped post op, but stable and asymptomatic    DM2, uncontrolled  --HbA1c 8.8%.  --Hold Metformin.  --continue ssi. Will increase bid levemir to 20 units and TID prandial to 18 units  --DM educator has seen    Wound, right buttock  --poa, now with skin tear  --WOC consulted and have provided recommendations    PAZ cirrhosis  --Diagnosed during recent admission at [facility].  --Continue Lasix, Aldactone, Nadolol  --Outpatient GI follow up as previously planned, will need referral at discharge     Recent osteomyelitis of left foot with MRSA bacteremia, s/p amputation left foot May 2022  --Was hospitalized at [facility] due to left foot osteomyelitis, initially had a washout and IV antibiotics but due to no improvement, ultimately had midfoot amputation performed by Podiatry on 5/18/22. He had MRSA bacteremia associated with this and completed 8 week course of IV Vancomycin.     Constipation, resolved  --Continue bowel  regimen     Insomnia  Anxiety  --continue Trazodone 50 mg nightly. Adjust as needed.  --add hydroxyzine tid as needed     Expected Discharge Location and Transportation: Acute rehab in [facility]   Expected Discharge Date:10/4    DVT prophylaxis:  Mechanical DVT prophylaxis orders are present.     AM-PAC 6 Clicks Score (PT): 16 (09/30/22 1036)    CODE STATUS:   Code Status and Medical Interventions:   Ordered at: 09/21/22 1645     Level Of Support Discussed With:    Patient     Code Status (Patient has no pulse and is not breathing):    CPR (Attempt to Resuscitate)     Medical Interventions (Patient has pulse or is breathing):    Full       Meron Fulton, APRN  10/01/22               [id number]

## 2024-03-28 ENCOUNTER — LAB (OUTPATIENT)
Dept: LAB | Facility: HOSPITAL | Age: 59
End: 2024-03-28
Payer: MEDICAID

## 2024-03-28 DIAGNOSIS — B95.62 MRSA BACTEREMIA: ICD-10-CM

## 2024-03-28 DIAGNOSIS — R78.81 MRSA BACTEREMIA: ICD-10-CM

## 2024-03-28 DIAGNOSIS — M00.061 STAPHYLOCOCCAL ARTHRITIS OF RIGHT KNEE: ICD-10-CM

## 2024-03-28 DIAGNOSIS — Z96.651 STATUS POST TOTAL RIGHT KNEE REPLACEMENT: ICD-10-CM

## 2024-03-28 LAB
ALBUMIN SERPL-MCNC: 3.9 G/DL (ref 3.5–5.2)
ALBUMIN/GLOB SERPL: 1.3 G/DL
ALP SERPL-CCNC: 139 U/L (ref 39–117)
ALT SERPL W P-5'-P-CCNC: 40 U/L (ref 1–41)
ANION GAP SERPL CALCULATED.3IONS-SCNC: 10.3 MMOL/L (ref 5–15)
AST SERPL-CCNC: 43 U/L (ref 1–40)
BASOPHILS # BLD AUTO: 0.01 10*3/MM3 (ref 0–0.2)
BASOPHILS NFR BLD AUTO: 0.3 % (ref 0–1.5)
BILIRUB SERPL-MCNC: 0.7 MG/DL (ref 0–1.2)
BUN SERPL-MCNC: 17 MG/DL (ref 6–20)
BUN/CREAT SERPL: 17.3 (ref 7–25)
CALCIUM SPEC-SCNC: 8.9 MG/DL (ref 8.6–10.5)
CHLORIDE SERPL-SCNC: 106 MMOL/L (ref 98–107)
CO2 SERPL-SCNC: 23.7 MMOL/L (ref 22–29)
CREAT SERPL-MCNC: 0.98 MG/DL (ref 0.76–1.27)
CRP SERPL-MCNC: 0.53 MG/DL (ref 0–0.5)
DEPRECATED RDW RBC AUTO: 46.2 FL (ref 37–54)
EGFRCR SERPLBLD CKD-EPI 2021: 88.8 ML/MIN/1.73
EOSINOPHIL # BLD AUTO: 0.07 10*3/MM3 (ref 0–0.4)
EOSINOPHIL NFR BLD AUTO: 2.3 % (ref 0.3–6.2)
ERYTHROCYTE [DISTWIDTH] IN BLOOD BY AUTOMATED COUNT: 14.6 % (ref 12.3–15.4)
GLOBULIN UR ELPH-MCNC: 2.9 GM/DL
GLUCOSE SERPL-MCNC: 151 MG/DL (ref 65–99)
HCT VFR BLD AUTO: 43 % (ref 37.5–51)
HGB BLD-MCNC: 13.8 G/DL (ref 13–17.7)
IMM GRANULOCYTES # BLD AUTO: 0.01 10*3/MM3 (ref 0–0.05)
IMM GRANULOCYTES NFR BLD AUTO: 0.3 % (ref 0–0.5)
LYMPHOCYTES # BLD AUTO: 0.69 10*3/MM3 (ref 0.7–3.1)
LYMPHOCYTES NFR BLD AUTO: 22.2 % (ref 19.6–45.3)
MCH RBC QN AUTO: 27.5 PG (ref 26.6–33)
MCHC RBC AUTO-ENTMCNC: 32.1 G/DL (ref 31.5–35.7)
MCV RBC AUTO: 85.7 FL (ref 79–97)
MONOCYTES # BLD AUTO: 0.19 10*3/MM3 (ref 0.1–0.9)
MONOCYTES NFR BLD AUTO: 6.1 % (ref 5–12)
NEUTROPHILS NFR BLD AUTO: 2.14 10*3/MM3 (ref 1.7–7)
NEUTROPHILS NFR BLD AUTO: 68.8 % (ref 42.7–76)
NRBC BLD AUTO-RTO: 0 /100 WBC (ref 0–0.2)
PLATELET # BLD AUTO: 50 10*3/MM3 (ref 140–450)
PMV BLD AUTO: 11.7 FL (ref 6–12)
POTASSIUM SERPL-SCNC: 4.3 MMOL/L (ref 3.5–5.2)
PROT SERPL-MCNC: 6.8 G/DL (ref 6–8.5)
RBC # BLD AUTO: 5.02 10*6/MM3 (ref 4.14–5.8)
SODIUM SERPL-SCNC: 140 MMOL/L (ref 136–145)
WBC NRBC COR # BLD AUTO: 3.11 10*3/MM3 (ref 3.4–10.8)

## 2024-03-28 PROCEDURE — 86140 C-REACTIVE PROTEIN: CPT

## 2024-03-28 PROCEDURE — 80053 COMPREHEN METABOLIC PANEL: CPT

## 2024-03-28 PROCEDURE — 85025 COMPLETE CBC W/AUTO DIFF WBC: CPT

## 2024-03-28 PROCEDURE — 36415 COLL VENOUS BLD VENIPUNCTURE: CPT

## 2024-04-02 ENCOUNTER — OFFICE VISIT (OUTPATIENT)
Dept: INFECTIOUS DISEASES | Facility: CLINIC | Age: 59
End: 2024-04-02
Payer: MEDICAID

## 2024-04-02 VITALS
OXYGEN SATURATION: 95 % | WEIGHT: 244.8 LBS | SYSTOLIC BLOOD PRESSURE: 154 MMHG | DIASTOLIC BLOOD PRESSURE: 77 MMHG | BODY MASS INDEX: 35.05 KG/M2 | HEART RATE: 84 BPM | TEMPERATURE: 98.2 F | HEIGHT: 70 IN

## 2024-04-02 DIAGNOSIS — Z96.651 STATUS POST TOTAL RIGHT KNEE REPLACEMENT: ICD-10-CM

## 2024-04-02 DIAGNOSIS — M00.061 STAPHYLOCOCCAL ARTHRITIS OF RIGHT KNEE: ICD-10-CM

## 2024-04-02 DIAGNOSIS — I07.9 ENDOCARDITIS OF TRICUSPID VALVE: Primary | ICD-10-CM

## 2024-04-02 NOTE — PROGRESS NOTES
[facility]         Referring Provider: Missy Up, ROSALINA  [address] [place] [address]    Subjective      Chief Complaint  Follow-up (MRSA bacteremia)    History of Present Illness  Melchor Hardwick III is a 59 y.o. male who presents today to [facility] for Follow Up . 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 No fever, no chills and no night sweats. I reviewed labs from last week and CRP is improved back to 0.57.     Pain is about the same, no fever, no chills and no erythema, no swelling of the knee. No clinical sepsis. Blood glucose have been running little high.    Past Medical History:   Diagnosis Date    Diabetes mellitus     4 times per day gets checked for sugar at rehab    Dizzy     Hyperlipidemia     Hypertension     Hypothyroid 11/30/2022    MRSA infection     blood -   2022    Orthostatic hypotension     Pressure sore on buttocks     top crack -  sees wound - but now rn take care of it at rehab - minor opening - dime size - pat nurse didnt assess-  pt states getting better    Pyogenic arthritis of right knee joint, due to unspecified organism 09/21/2022    Sepsis     Wears glasses     readers       Past Surgical History:   Procedure Laterality Date    ABSCESS DRAINAGE  2004    PERINEUM    AMPUTATION FOOT Left 05/18/2022    Procedure: AMPUTATION FOOT;  Surgeon: Addison Colby MD;  Location: [facility] OR;  Service: Podiatry;  Laterality: Left;    INCISION AND DRAINAGE LEG Left 05/10/2022    Procedure: INCISION AND DRAINAGE LOWER EXTREMITY;  Surgeon: Addison Colby MD;  Location: [facility] OR;  Service: Podiatry;  Laterality: Left;    KNEE INCISION AND DRAINAGE Right 09/21/2022    Procedure: KNEE INCISION AND DRAINAGE RIGHT;  Surgeon: Brain Bentley MD;  Location: [facility] OR;  Service: Orthopedics;  Laterality: Right;    PERIPHERALLY INSERTED CENTRAL CATHETER INSERTION Left      TOTAL KNEE  PROSTHESIS REMOVAL W/ SPACER INSERTION Right 11/30/2022    Procedure: ANTIBIOTIC SPACER PLACEMENT KNEE - RIGHT;  Surgeon: Brain Bentley MD;  Location:  [facility] OR;  Service: Orthopedics;  Laterality: Right;    WOUND DEBRIDEMENT Left 05/13/2022    Procedure: DEBRIDEMENT FOOT;  Surgeon: Addison Colby MD;  Location:  [facility] OR;  Service: Podiatry;  Laterality: Left;       Social History     Socioeconomic History    Marital status: Single   Tobacco Use    Smoking status: Never    Smokeless tobacco: Never   Vaping Use    Vaping status: Never Used   Substance and Sexual Activity    Alcohol use: Never    Drug use: Never    Sexual activity: Defer       Family History  family history is not on file.      There is no immunization history on file for this patient.     Allergies  No Known Allergies    The medication list has been reviewed and updated.   Current Medications    Current Outpatient Medications:     acetaminophen (TYLENOL) 325 MG tablet, Take 2 tablets by mouth Every 4 (Four) Hours As Needed for Mild Pain., Disp: , Rfl:     ascorbic acid (VITAMIN C) 500 MG tablet, Take 1 tablet by mouth Daily., Disp: , Rfl:     bisacodyl (DULCOLAX) 10 MG suppository, Insert 1 suppository into the rectum Daily As Needed for Constipation., Disp: , Rfl:     cyclobenzaprine (FLEXERIL) 5 MG tablet, Take 1 tablet by mouth 3 (Three) Times a Day As Needed for Muscle Spasms., Disp: , Rfl:     Diclofenac Sodium (VOLTAREN) 1 % gel gel, Apply 4 g topically to the appropriate area as directed 4 (Four) Times a Day As Needed., Disp: , Rfl:     docusate sodium 100 MG capsule, Take 1 capsule by mouth 2 (Two) Times a Day., Disp: , Rfl:     doxycycline (VIBRAMYICN) 100 MG tablet, Take 1 tablet by mouth Daily., Disp: , Rfl:     fludrocortisone 0.1 MG tablet, Take 0.5 tablets by mouth Daily., Disp: , Rfl:     Insulin Aspart (novoLOG) 100 UNIT/ML injection, Inject 0-14 Units under the skin into the appropriate area as directed 3 (Three)  Times a Day Before Meals., Disp: , Rfl: 12    Insulin Aspart (novoLOG) 100 UNIT/ML injection, Inject 4 Units under the skin into the appropriate area as directed 3 (Three) Times a Day Before Meals., Disp: , Rfl: 12    insulin detemir (LEVEMIR) 100 UNIT/ML injection, Inject 17 Units under the skin into the appropriate area as directed Every 12 (Twelve) Hours., Disp: , Rfl: 12    levothyroxine (SYNTHROID, LEVOTHROID) 50 MCG tablet, Take 1 tablet by mouth Daily., Disp: , Rfl:     multivitamin with minerals tablet tablet, Take 1 tablet by mouth Daily., Disp: , Rfl:     pantoprazole (PROTONIX) 40 MG EC tablet, Take 1 tablet by mouth Every Morning., Disp: , Rfl:     polyethylene glycol (MIRALAX) 17 g packet, Take 17 g by mouth Daily., Disp: , Rfl:     pregabalin (LYRICA) 100 MG capsule, Take 1 capsule by mouth Every 12 (Twelve) Hours. (Patient taking differently: Take 1 capsule by mouth Every 12 (Twelve) Hours. Pt states he is taking 25 mg), Disp: , Rfl:     Vitamins A & D (magic barrier cream), Apply 1 application topically to the appropriate area as directed As Needed (skin irritation)., Disp: , Rfl:     meloxicam (MOBIC) 15 MG tablet, Take 1 tablet by mouth Daily. (Patient not taking: Reported on 7/11/2023), Disp: , Rfl:     midodrine (PROAMATINE) 2.5 MG tablet, Take 1 tablet by mouth 3 (Three) Times a Day Before Meals. Hold if SBP is above 120 (Patient not taking: Reported on 7/11/2023), Disp: , Rfl:       Review of Systems    Review of Systems   Constitutional:  Negative for activity change, appetite change, chills, diaphoresis and fever.   HENT:  Negative for congestion, dental problem, drooling, mouth sores, sinus pressure and sneezing.    Eyes:  Negative for blurred vision, double vision, photophobia and discharge.   Respiratory:  Negative for apnea, cough, choking, chest tightness, shortness of breath and wheezing.    Cardiovascular:  Negative for chest pain, palpitations and leg swelling.   Gastrointestinal:   "Negative for abdominal distention, abdominal pain, diarrhea, nausea and vomiting.   Genitourinary:  Negative for dysuria, flank pain and frequency.   Musculoskeletal:  Negative for arthralgias, gait problem, neck pain and neck stiffness.   Skin:  Negative for rash.   Neurological:  Negative for dizziness, tremors, seizures, syncope, speech difficulty, numbness, headache and confusion.   Psychiatric/Behavioral:  Negative for agitation, behavioral problems, hallucinations and suicidal ideas.         Objective     Vital Signs:  /77 (BP Location: Right arm, Patient Position: Sitting, Cuff Size: Small Adult)   Pulse 84   Temp 98.2 °F (36.8 °C) (Oral)   Ht 177.8 cm (70\")   Wt 111 kg (244 lb 12.8 oz)   SpO2 95%   BMI 35.13 kg/m²   Estimated body mass index is 35.13 kg/m² as calculated from the following:    Height as of this encounter: 177.8 cm (70\").    Weight as of this encounter: 111 kg (244 lb 12.8 oz).    Physical Exam  Constitutional:       General: He is not in acute distress.     Appearance: Normal appearance. He is normal weight. He is not ill-appearing, toxic-appearing or diaphoretic.   HENT:      Head: Normocephalic and atraumatic.      Nose: Nose normal. No congestion or rhinorrhea.      Mouth/Throat:      Mouth: Mucous membranes are moist.      Pharynx: Oropharynx is clear.   Eyes:      General: No scleral icterus.     Extraocular Movements: Extraocular movements intact.      Pupils: Pupils are equal, round, and reactive to light.   Neck:      Vascular: No carotid bruit.   Cardiovascular:      Rate and Rhythm: Normal rate and regular rhythm.      Pulses: Normal pulses.      Heart sounds: No murmur heard.  Pulmonary:      Effort: Pulmonary effort is normal. No respiratory distress.      Breath sounds: Normal breath sounds. No stridor. No wheezing, rhonchi or rales.   Chest:      Chest wall: No tenderness.   Abdominal:      General: Abdomen is flat. Bowel sounds are normal. There is no distension.    " "  Palpations: Abdomen is soft.      Tenderness: There is no abdominal tenderness. There is no guarding or rebound.   Musculoskeletal:      Cervical back: Normal range of motion and neck supple. No rigidity or tenderness.   Lymphadenopathy:      Cervical: No cervical adenopathy.   Skin:     Coloration: Skin is not jaundiced.      Findings: No lesion or rash.   Neurological:      General: No focal deficit present.      Mental Status: He is alert and oriented to person, place, and time.   Psychiatric:         Mood and Affect: Mood normal.         Behavior: Behavior normal.          Result Review :  The following data was reviewed by Marilynn Giordano MD     Lab Results  Lab Results   Component Value Date    WBC 3.11 (L) 03/28/2024    HGB 13.8 03/28/2024    HCT 43.0 03/28/2024    MCV 85.7 03/28/2024    PLT 50 (L) 03/28/2024     Lab Results   Component Value Date    GLUCOSE 151 (H) 03/28/2024    BUN 17 03/28/2024    CREATININE 0.98 03/28/2024    BCR 17.3 03/28/2024    K 4.3 03/28/2024    CO2 23.7 03/28/2024    CALCIUM 8.9 03/28/2024    ALBUMIN 3.9 03/28/2024    AST 43 (H) 03/28/2024    ALT 40 03/28/2024      Lab Results   Component Value Date    CRP 0.53 (H) 03/28/2024        No results found for: \"ACANTHNAEG\", \"AFBCX\", \"BPERTUSSISCX\", \"BLOODCX\"  No results found for: \"BCIDPCR\", \"CXREFLEX\", \"CSFCX\", \"CULTURETIS\"  No results found for: \"CULTURES\", \"HSVCX\", \"URCX\"  No results found for: \"EYECULTURE\", \"GCCX\", \"HSVCULTURE\", \"LABHSV\"  No results found for: \"LEGIONELLA\", \"MRSACX\", \"MUMPSCX\", \"MYCOPLASCX\"  No results found for: \"NOCARDIACX\", \"STOOLCX\"  No results found for: \"THROATCX\", \"UNSTIMCULT\", \"URINECX\", \"CULTURE\", \"VZVCULTUR\"  No results found for: \"VIRALCULTU\", \"WOUNDCX\"    Radiology Results              Assessment / Plan        Diagnoses and all orders for this visit:    1. Endocarditis of tricuspid valve (Primary)    2. Status post total right knee replacement    3. Staphylococcal arthritis of right knee      Labs " with improving CRP level at 0.53 with no evidence of clinical sepsis to be concerned about.     Leukopenia is stable at 3.10 and thrombocytopenia stable at 50     Patient is doing great with no complaints and no evidence of clinical sepsis. Will follow closely.     Continue with oral lifelong suppressive therapy with doxycycline 100 mg daily that was decreased from bid on 6/6/23.     Will check labs 5/31/24 and follow up with me on 6/4/24.     Consider compression stockings to the right lower extremity.          Follow Up   Return in about 9 weeks (around 6/4/2024).    Visit Diagnoses:    ICD-10-CM ICD-9-CM   1. Endocarditis of tricuspid valve  I07.9 424.2   2. Status post total right knee replacement  Z96.651 V43.65   3. Staphylococcal arthritis of right knee  M00.061 711.06     041.10       Patient was given instructions and counseling regarding his condition or for health maintenance advice. Please see specific information pulled into the AVS if appropriate.     This document has been electronically signed by Marilynn Giordano MD   April 2, 2024 08:17 EDT    Dictated Utilizing Dragon Dictation: Part of this note may be an electronic transcription/translation of spoken language to printed text using the Dragon Dictation System.

## 2024-05-29 ENCOUNTER — LAB (OUTPATIENT)
Dept: LAB | Facility: HOSPITAL | Age: 59
End: 2024-05-29
Payer: MEDICAID

## 2024-05-29 DIAGNOSIS — Z96.651 STATUS POST TOTAL RIGHT KNEE REPLACEMENT: ICD-10-CM

## 2024-05-29 DIAGNOSIS — I07.9 ENDOCARDITIS OF TRICUSPID VALVE: ICD-10-CM

## 2024-05-29 DIAGNOSIS — M00.061 STAPHYLOCOCCAL ARTHRITIS OF RIGHT KNEE: ICD-10-CM

## 2024-05-29 LAB
ALBUMIN SERPL-MCNC: 3.9 G/DL (ref 3.5–5.2)
ALBUMIN/GLOB SERPL: 1.6 G/DL
ALP SERPL-CCNC: 101 U/L (ref 39–117)
ALT SERPL W P-5'-P-CCNC: 35 U/L (ref 1–41)
ANION GAP SERPL CALCULATED.3IONS-SCNC: 11.4 MMOL/L (ref 5–15)
ANISOCYTOSIS BLD QL: NORMAL
AST SERPL-CCNC: 57 U/L (ref 1–40)
BASOPHILS # BLD AUTO: 0.02 10*3/MM3 (ref 0–0.2)
BASOPHILS NFR BLD AUTO: 0.7 % (ref 0–1.5)
BILIRUB SERPL-MCNC: 1.3 MG/DL (ref 0–1.2)
BUN SERPL-MCNC: 15 MG/DL (ref 6–20)
BUN/CREAT SERPL: 16.3 (ref 7–25)
CALCIUM SPEC-SCNC: 8.7 MG/DL (ref 8.6–10.5)
CHLORIDE SERPL-SCNC: 104 MMOL/L (ref 98–107)
CO2 SERPL-SCNC: 22.6 MMOL/L (ref 22–29)
CREAT SERPL-MCNC: 0.92 MG/DL (ref 0.76–1.27)
CRP SERPL-MCNC: 0.65 MG/DL (ref 0–0.5)
DEPRECATED RDW RBC AUTO: 47.4 FL (ref 37–54)
EGFRCR SERPLBLD CKD-EPI 2021: 95.8 ML/MIN/1.73
EOSINOPHIL # BLD AUTO: 0.1 10*3/MM3 (ref 0–0.4)
EOSINOPHIL NFR BLD AUTO: 3.3 % (ref 0.3–6.2)
ERYTHROCYTE [DISTWIDTH] IN BLOOD BY AUTOMATED COUNT: 15.3 % (ref 12.3–15.4)
GLOBULIN UR ELPH-MCNC: 2.5 GM/DL
GLUCOSE SERPL-MCNC: 150 MG/DL (ref 65–99)
HCT VFR BLD AUTO: 41.3 % (ref 37.5–51)
HGB BLD-MCNC: 13.6 G/DL (ref 13–17.7)
IMM GRANULOCYTES # BLD AUTO: 0.01 10*3/MM3 (ref 0–0.05)
IMM GRANULOCYTES NFR BLD AUTO: 0.3 % (ref 0–0.5)
LYMPHOCYTES # BLD AUTO: 0.65 10*3/MM3 (ref 0.7–3.1)
LYMPHOCYTES NFR BLD AUTO: 21.5 % (ref 19.6–45.3)
MCH RBC QN AUTO: 27.9 PG (ref 26.6–33)
MCHC RBC AUTO-ENTMCNC: 32.9 G/DL (ref 31.5–35.7)
MCV RBC AUTO: 84.6 FL (ref 79–97)
MONOCYTES # BLD AUTO: 0.23 10*3/MM3 (ref 0.1–0.9)
MONOCYTES NFR BLD AUTO: 7.6 % (ref 5–12)
NEUTROPHILS NFR BLD AUTO: 2.01 10*3/MM3 (ref 1.7–7)
NEUTROPHILS NFR BLD AUTO: 66.6 % (ref 42.7–76)
NRBC BLD AUTO-RTO: 0 /100 WBC (ref 0–0.2)
PLATELET # BLD AUTO: 47 10*3/MM3 (ref 140–450)
PMV BLD AUTO: 10.8 FL (ref 6–12)
POIKILOCYTOSIS BLD QL SMEAR: NORMAL
POTASSIUM SERPL-SCNC: 4.1 MMOL/L (ref 3.5–5.2)
PROT SERPL-MCNC: 6.4 G/DL (ref 6–8.5)
RBC # BLD AUTO: 4.88 10*6/MM3 (ref 4.14–5.8)
SMALL PLATELETS BLD QL SMEAR: NORMAL
SODIUM SERPL-SCNC: 138 MMOL/L (ref 136–145)
WBC NRBC COR # BLD AUTO: 3.02 10*3/MM3 (ref 3.4–10.8)

## 2024-05-29 PROCEDURE — 85025 COMPLETE CBC W/AUTO DIFF WBC: CPT

## 2024-05-29 PROCEDURE — 86140 C-REACTIVE PROTEIN: CPT

## 2024-05-29 PROCEDURE — 85007 BL SMEAR W/DIFF WBC COUNT: CPT

## 2024-05-29 PROCEDURE — 36415 COLL VENOUS BLD VENIPUNCTURE: CPT

## 2024-05-29 PROCEDURE — 80053 COMPREHEN METABOLIC PANEL: CPT

## 2024-06-04 ENCOUNTER — OFFICE VISIT (OUTPATIENT)
Dept: INFECTIOUS DISEASES | Facility: CLINIC | Age: 59
End: 2024-06-04
Payer: MEDICAID

## 2024-06-04 VITALS
HEIGHT: 70 IN | DIASTOLIC BLOOD PRESSURE: 76 MMHG | HEART RATE: 85 BPM | SYSTOLIC BLOOD PRESSURE: 156 MMHG | BODY MASS INDEX: 35.02 KG/M2 | OXYGEN SATURATION: 95 % | WEIGHT: 244.6 LBS

## 2024-06-04 DIAGNOSIS — K74.69 OTHER CIRRHOSIS OF LIVER: ICD-10-CM

## 2024-06-04 DIAGNOSIS — Z96.651 STATUS POST TOTAL RIGHT KNEE REPLACEMENT: ICD-10-CM

## 2024-06-04 DIAGNOSIS — B95.62 MRSA BACTEREMIA: ICD-10-CM

## 2024-06-04 DIAGNOSIS — M00.061 STAPHYLOCOCCAL ARTHRITIS OF RIGHT KNEE: ICD-10-CM

## 2024-06-04 DIAGNOSIS — R78.81 MRSA BACTEREMIA: ICD-10-CM

## 2024-06-04 DIAGNOSIS — I07.9 ENDOCARDITIS OF TRICUSPID VALVE: Primary | ICD-10-CM

## 2024-06-04 NOTE — PROGRESS NOTES
[facility]         Referring Provider: Missy Up, ROSALINA  [address] [place] [address]    Subjective      Chief Complaint  Follow-up (Endocarditis of tricuspid, staph)    History of Present Illness  Melchor Hardwick III is a 59 y.o. male who presents today to [facility] for Follow Up .     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 No fever, no chills and no night sweats. I reviewed labs from last week..     Pain is about the same, no fever, no chills and no erythema, no swelling of the knee. No clinical sepsis. Platelets are running little lower than usual. No fever and no chills.    Past Medical History:   Diagnosis Date    Diabetes mellitus     4 times per day gets checked for sugar at rehab    Dizzy     Hyperlipidemia     Hypertension     Hypothyroid 11/30/2022    MRSA infection     blood -   2022    Orthostatic hypotension     Pressure sore on buttocks     top crack -  sees wound - but now rn take care of it at rehab - minor opening - dime size - pat nurse didnt assess-  pt states getting better    Pyogenic arthritis of right knee joint, due to unspecified organism 09/21/2022    Sepsis     Wears glasses     readers       Past Surgical History:   Procedure Laterality Date    ABSCESS DRAINAGE  2004    PERINEUM    AMPUTATION FOOT Left 05/18/2022    Procedure: AMPUTATION FOOT;  Surgeon: Addison Colby MD;  Location: [facility] OR;  Service: Podiatry;  Laterality: Left;    INCISION AND DRAINAGE LEG Left 05/10/2022    Procedure: INCISION AND DRAINAGE LOWER EXTREMITY;  Surgeon: Addison Colby MD;  Location: [facility] OR;  Service: Podiatry;  Laterality: Left;    KNEE INCISION AND DRAINAGE Right 09/21/2022    Procedure: KNEE INCISION AND DRAINAGE RIGHT;  Surgeon: Brain Bentley MD;  [facility];  Service: Orthopedics;  Laterality: Right;    PERIPHERALLY INSERTED CENTRAL CATHETER  INSERTION Left     TOTAL KNEE  PROSTHESIS REMOVAL W/ SPACER INSERTION Right 11/30/2022    Procedure: ANTIBIOTIC SPACER PLACEMENT KNEE - RIGHT;  Surgeon: Brain Bentley MD;  Location:  [facility] OR;  Service: Orthopedics;  Laterality: Right;    WOUND DEBRIDEMENT Left 05/13/2022    Procedure: DEBRIDEMENT FOOT;  Surgeon: Addison Colby MD;  Location:  [facility] OR;  Service: Podiatry;  Laterality: Left;       Social History     Socioeconomic History    Marital status: Single   Tobacco Use    Smoking status: Never    Smokeless tobacco: Never   Vaping Use    Vaping status: Never Used   Substance and Sexual Activity    Alcohol use: Never    Drug use: Never    Sexual activity: Defer       Family History  family history is not on file.      There is no immunization history on file for this patient.     Allergies  No Known Allergies    The medication list has been reviewed and updated.   Current Medications    Current Outpatient Medications:     acetaminophen (TYLENOL) 325 MG tablet, Take 2 tablets by mouth Every 4 (Four) Hours As Needed for Mild Pain., Disp: , Rfl:     ascorbic acid (VITAMIN C) 500 MG tablet, Take 1 tablet by mouth Daily., Disp: , Rfl:     bisacodyl (DULCOLAX) 10 MG suppository, Insert 1 suppository into the rectum Daily As Needed for Constipation., Disp: , Rfl:     cyclobenzaprine (FLEXERIL) 5 MG tablet, Take 1 tablet by mouth 3 (Three) Times a Day As Needed for Muscle Spasms., Disp: , Rfl:     Diclofenac Sodium (VOLTAREN) 1 % gel gel, Apply 4 g topically to the appropriate area as directed 4 (Four) Times a Day As Needed., Disp: , Rfl:     docusate sodium 100 MG capsule, Take 1 capsule by mouth 2 (Two) Times a Day., Disp: , Rfl:     doxycycline (VIBRAMYICN) 100 MG tablet, Take 1 tablet by mouth Daily., Disp: , Rfl:     fludrocortisone 0.1 MG tablet, Take 0.5 tablets by mouth Daily., Disp: , Rfl:     Insulin Aspart (novoLOG) 100 UNIT/ML injection, Inject 0-14 Units under the skin into the appropriate area as  directed 3 (Three) Times a Day Before Meals., Disp: , Rfl: 12    Insulin Aspart (novoLOG) 100 UNIT/ML injection, Inject 4 Units under the skin into the appropriate area as directed 3 (Three) Times a Day Before Meals., Disp: , Rfl: 12    insulin detemir (LEVEMIR) 100 UNIT/ML injection, Inject 17 Units under the skin into the appropriate area as directed Every 12 (Twelve) Hours., Disp: , Rfl: 12    levothyroxine (SYNTHROID, LEVOTHROID) 50 MCG tablet, Take 1 tablet by mouth Daily., Disp: , Rfl:     meloxicam (MOBIC) 15 MG tablet, Take 1 tablet by mouth Daily. (Patient not taking: Reported on 7/11/2023), Disp: , Rfl:     midodrine (PROAMATINE) 2.5 MG tablet, Take 1 tablet by mouth 3 (Three) Times a Day Before Meals. Hold if SBP is above 120 (Patient not taking: Reported on 7/11/2023), Disp: , Rfl:     multivitamin with minerals tablet tablet, Take 1 tablet by mouth Daily., Disp: , Rfl:     pantoprazole (PROTONIX) 40 MG EC tablet, Take 1 tablet by mouth Every Morning., Disp: , Rfl:     polyethylene glycol (MIRALAX) 17 g packet, Take 17 g by mouth Daily., Disp: , Rfl:     pregabalin (LYRICA) 100 MG capsule, Take 1 capsule by mouth Every 12 (Twelve) Hours. (Patient taking differently: Take 1 capsule by mouth Every 12 (Twelve) Hours. Pt states he is taking 25 mg), Disp: , Rfl:     Vitamins A & D (magic barrier cream), Apply 1 application topically to the appropriate area as directed As Needed (skin irritation)., Disp: , Rfl:       Review of Systems    Review of Systems   Constitutional:  Negative for activity change, appetite change, chills, diaphoresis and fever.   HENT:  Negative for congestion, dental problem, drooling, mouth sores, sinus pressure and sneezing.    Eyes:  Negative for blurred vision, double vision, photophobia and discharge.   Respiratory:  Negative for apnea, cough, choking, chest tightness, shortness of breath and wheezing.    Cardiovascular:  Negative for chest pain, palpitations and leg swelling.  "  Gastrointestinal:  Negative for abdominal distention, abdominal pain, diarrhea, nausea and vomiting.   Genitourinary:  Negative for dysuria, flank pain and frequency.   Musculoskeletal:  Negative for arthralgias, gait problem, neck pain and neck stiffness.   Skin:  Negative for rash.   Neurological:  Negative for dizziness, tremors, seizures, syncope, speech difficulty, numbness, headache and confusion.   Psychiatric/Behavioral:  Negative for agitation, behavioral problems, hallucinations and suicidal ideas.         Objective     Vital Signs:  /76 (BP Location: Right arm, Patient Position: Sitting, Cuff Size: Small Adult)   Pulse 85   Ht 177.8 cm (70\")   Wt 111 kg (244 lb 9.6 oz)   SpO2 95%   BMI 35.10 kg/m²   Estimated body mass index is 35.1 kg/m² as calculated from the following:    Height as of this encounter: 177.8 cm (70\").    Weight as of this encounter: 111 kg (244 lb 9.6 oz).    Physical Exam  Constitutional:       General: He is not in acute distress.     Appearance: Normal appearance. He is normal weight. He is not ill-appearing, toxic-appearing or diaphoretic.   HENT:      Head: Normocephalic and atraumatic.      Nose: Nose normal. No congestion or rhinorrhea.      Mouth/Throat:      Mouth: Mucous membranes are moist.      Pharynx: Oropharynx is clear.   Eyes:      General: No scleral icterus.     Extraocular Movements: Extraocular movements intact.      Pupils: Pupils are equal, round, and reactive to light.   Neck:      Vascular: No carotid bruit.   Cardiovascular:      Rate and Rhythm: Normal rate and regular rhythm.      Pulses: Normal pulses.      Heart sounds: No murmur heard.  Pulmonary:      Effort: Pulmonary effort is normal. No respiratory distress.      Breath sounds: Normal breath sounds. No stridor. No wheezing, rhonchi or rales.   Chest:      Chest wall: No tenderness.   Abdominal:      General: Abdomen is flat. Bowel sounds are normal. There is no distension.      Palpations: " "Abdomen is soft.      Tenderness: There is no abdominal tenderness. There is no guarding or rebound.   Musculoskeletal:      Cervical back: Normal range of motion and neck supple. No rigidity or tenderness.   Lymphadenopathy:      Cervical: No cervical adenopathy.   Skin:     Coloration: Skin is not jaundiced.      Findings: No lesion or rash.   Neurological:      General: No focal deficit present.      Mental Status: He is alert and oriented to person, place, and time.   Psychiatric:         Mood and Affect: Mood normal.         Behavior: Behavior normal.          Result Review :  The following data was reviewed by Marilynn Giordano MD     Lab Results  Lab Results   Component Value Date    WBC 3.02 (L) 05/29/2024    HGB 13.6 05/29/2024    HCT 41.3 05/29/2024    MCV 84.6 05/29/2024    PLT 47 (C) 05/29/2024     Lab Results   Component Value Date    GLUCOSE 150 (H) 05/29/2024    BUN 15 05/29/2024    CREATININE 0.92 05/29/2024    BCR 16.3 05/29/2024    K 4.1 05/29/2024    CO2 22.6 05/29/2024    CALCIUM 8.7 05/29/2024    ALBUMIN 3.9 05/29/2024    AST 57 (H) 05/29/2024    ALT 35 05/29/2024      Lab Results   Component Value Date    CRP 0.65 (H) 05/29/2024        No results found for: \"ACANTHNAEG\", \"AFBCX\", \"BPERTUSSISCX\", \"BLOODCX\"  No results found for: \"BCIDPCR\", \"CXREFLEX\", \"CSFCX\", \"CULTURETIS\"  No results found for: \"CULTURES\", \"HSVCX\", \"URCX\"  No results found for: \"EYECULTURE\", \"GCCX\", \"HSVCULTURE\", \"LABHSV\"  No results found for: \"LEGIONELLA\", \"MRSACX\", \"MUMPSCX\", \"MYCOPLASCX\"  No results found for: \"NOCARDIACX\", \"STOOLCX\"  No results found for: \"THROATCX\", \"UNSTIMCULT\", \"URINECX\", \"CULTURE\", \"VZVCULTUR\"  No results found for: \"VIRALCULTU\", \"WOUNDCX\"    Radiology Results              Assessment / Plan        Diagnoses and all orders for this visit:    1. Endocarditis of tricuspid valve (Primary)  -     Comprehensive Metabolic Panel; Future  -     C-reactive Protein; Future  -     CBC & Differential; " Future    2. MRSA bacteremia  -     Comprehensive Metabolic Panel; Future  -     C-reactive Protein; Future  -     CBC & Differential; Future    3. Other cirrhosis of liver  -     Comprehensive Metabolic Panel; Future  -     C-reactive Protein; Future  -     CBC & Differential; Future    4. Status post total right knee replacement  -     Comprehensive Metabolic Panel; Future  -     C-reactive Protein; Future  -     CBC & Differential; Future    5. Staphylococcal arthritis of right knee  -     Comprehensive Metabolic Panel; Future  -     C-reactive Protein; Future  -     CBC & Differential; Future      Labs with improving CRP level at 0.53 with no evidence of clinical sepsis to be concerned about.     Leukopenia is stable at 3.10 and thrombocytopenia stable at 50     Patient is doing great with no complaints and no evidence of clinical sepsis. Will follow closely.     Continue with oral lifelong suppressive therapy with doxycycline 100 mg daily that was decreased from bid on 6/6/23.     I reviewed labs 5/29/24 with CRP at 0.65.    Plt are lower than usual at 47 so will recheck soon. No bleeding.     Consider compression stockings to the right lower extremity.    Will recheck labs in 6 weeks.          Follow Up   Return in about 6 weeks (around 7/16/2024) for Follow up, With Dr. Giordano.    Visit Diagnoses:    ICD-10-CM ICD-9-CM   1. Endocarditis of tricuspid valve  I07.9 424.2   2. MRSA bacteremia  R78.81 790.7    B95.62 041.12   3. Other cirrhosis of liver  K74.69 571.5   4. Status post total right knee replacement  Z96.651 V43.65   5. Staphylococcal arthritis of right knee  M00.061 711.06     041.10       Patient was given instructions and counseling regarding his condition or for health maintenance advice. Please see specific information pulled into the AVS if appropriate.     This document has been electronically signed by Marilynn Giordano MD   June 4, 2024 08:50 EDT    Dictated Utilizing Dragon Dictation: Part  of this note may be an electronic transcription/translation of spoken language to printed text using the Dragon Dictation System.

## 2024-07-11 ENCOUNTER — LAB (OUTPATIENT)
Dept: LAB | Facility: HOSPITAL | Age: 59
End: 2024-07-11
Payer: MEDICAID

## 2024-07-11 ENCOUNTER — TRANSCRIBE ORDERS (OUTPATIENT)
Dept: ADMINISTRATIVE | Facility: HOSPITAL | Age: 59
End: 2024-07-11
Payer: MEDICAID

## 2024-07-11 DIAGNOSIS — K74.69 OTHER CIRRHOSIS OF LIVER: ICD-10-CM

## 2024-07-11 DIAGNOSIS — Z12.5 ENCOUNTER FOR SCREENING FOR MALIGNANT NEOPLASM OF PROSTATE: ICD-10-CM

## 2024-07-11 DIAGNOSIS — Z96.651 STATUS POST TOTAL RIGHT KNEE REPLACEMENT: ICD-10-CM

## 2024-07-11 DIAGNOSIS — E03.8 OTHER SPECIFIED HYPOTHYROIDISM: ICD-10-CM

## 2024-07-11 DIAGNOSIS — B95.62 MRSA BACTEREMIA: ICD-10-CM

## 2024-07-11 DIAGNOSIS — E78.49 OTHER HYPERLIPIDEMIA: ICD-10-CM

## 2024-07-11 DIAGNOSIS — I07.9 ENDOCARDITIS OF TRICUSPID VALVE: ICD-10-CM

## 2024-07-11 DIAGNOSIS — E11.42 TYPE 2 DIABETES MELLITUS WITH DIABETIC POLYNEUROPATHY, UNSPECIFIED WHETHER LONG TERM INSULIN USE: Primary | ICD-10-CM

## 2024-07-11 DIAGNOSIS — M00.061 STAPHYLOCOCCAL ARTHRITIS OF RIGHT KNEE: ICD-10-CM

## 2024-07-11 DIAGNOSIS — E11.42 TYPE 2 DIABETES MELLITUS WITH DIABETIC POLYNEUROPATHY, UNSPECIFIED WHETHER LONG TERM INSULIN USE: ICD-10-CM

## 2024-07-11 DIAGNOSIS — R78.81 MRSA BACTEREMIA: ICD-10-CM

## 2024-07-11 LAB
ALBUMIN SERPL-MCNC: 3.9 G/DL (ref 3.5–5.2)
ALBUMIN/GLOB SERPL: 1.4 G/DL
ALP SERPL-CCNC: 104 U/L (ref 39–117)
ALT SERPL W P-5'-P-CCNC: 34 U/L (ref 1–41)
ANION GAP SERPL CALCULATED.3IONS-SCNC: 12 MMOL/L (ref 5–15)
AST SERPL-CCNC: 47 U/L (ref 1–40)
BASOPHILS # BLD MANUAL: 0.03 10*3/MM3 (ref 0–0.2)
BASOPHILS NFR BLD MANUAL: 1 % (ref 0–1.5)
BILIRUB SERPL-MCNC: 0.6 MG/DL (ref 0–1.2)
BUN SERPL-MCNC: 12 MG/DL (ref 6–20)
BUN/CREAT SERPL: 13 (ref 7–25)
CALCIUM SPEC-SCNC: 8.6 MG/DL (ref 8.6–10.5)
CHLORIDE SERPL-SCNC: 106 MMOL/L (ref 98–107)
CHOLEST SERPL-MCNC: 149 MG/DL (ref 0–200)
CO2 SERPL-SCNC: 21 MMOL/L (ref 22–29)
CREAT SERPL-MCNC: 0.92 MG/DL (ref 0.76–1.27)
CRP SERPL-MCNC: 0.51 MG/DL (ref 0–0.5)
DEPRECATED RDW RBC AUTO: 44 FL (ref 37–54)
EGFRCR SERPLBLD CKD-EPI 2021: 95.8 ML/MIN/1.73
EOSINOPHIL # BLD MANUAL: 0.05 10*3/MM3 (ref 0–0.4)
EOSINOPHIL NFR BLD MANUAL: 2 % (ref 0.3–6.2)
ERYTHROCYTE [DISTWIDTH] IN BLOOD BY AUTOMATED COUNT: 14.2 % (ref 12.3–15.4)
GLOBULIN UR ELPH-MCNC: 2.7 GM/DL
GLUCOSE SERPL-MCNC: 128 MG/DL (ref 65–99)
HBA1C MFR BLD: 8.3 % (ref 4.8–5.6)
HCT VFR BLD AUTO: 40.9 % (ref 37.5–51)
HDLC SERPL-MCNC: 60 MG/DL (ref 40–60)
HGB BLD-MCNC: 13.2 G/DL (ref 13–17.7)
LDLC SERPL CALC-MCNC: 78 MG/DL (ref 0–100)
LDLC/HDLC SERPL: 1.32 {RATIO}
LYMPHOCYTES # BLD MANUAL: 0.65 10*3/MM3 (ref 0.7–3.1)
LYMPHOCYTES NFR BLD MANUAL: 6.1 % (ref 5–12)
MCH RBC QN AUTO: 27.6 PG (ref 26.6–33)
MCHC RBC AUTO-ENTMCNC: 32.3 G/DL (ref 31.5–35.7)
MCV RBC AUTO: 85.6 FL (ref 79–97)
MONOCYTES # BLD: 0.16 10*3/MM3 (ref 0.1–0.9)
NEUTROPHILS # BLD AUTO: 1.79 10*3/MM3 (ref 1.7–7)
NEUTROPHILS NFR BLD MANUAL: 66.7 % (ref 42.7–76)
PLAT MORPH BLD: NORMAL
PLATELET # BLD AUTO: 44 10*3/MM3 (ref 140–450)
PMV BLD AUTO: 11.1 FL (ref 6–12)
POIKILOCYTOSIS BLD QL SMEAR: ABNORMAL
POTASSIUM SERPL-SCNC: 4.2 MMOL/L (ref 3.5–5.2)
PROT SERPL-MCNC: 6.6 G/DL (ref 6–8.5)
PSA SERPL-MCNC: 0.2 NG/ML (ref 0–4)
RBC # BLD AUTO: 4.78 10*6/MM3 (ref 4.14–5.8)
SODIUM SERPL-SCNC: 139 MMOL/L (ref 136–145)
TRIGL SERPL-MCNC: 50 MG/DL (ref 0–150)
TSH SERPL DL<=0.05 MIU/L-ACNC: 2.1 UIU/ML (ref 0.27–4.2)
VARIANT LYMPHS NFR BLD MANUAL: 24.2 % (ref 19.6–45.3)
VLDLC SERPL-MCNC: 11 MG/DL (ref 5–40)
WBC MORPH BLD: NORMAL
WBC NRBC COR # BLD AUTO: 2.69 10*3/MM3 (ref 3.4–10.8)

## 2024-07-11 PROCEDURE — 84443 ASSAY THYROID STIM HORMONE: CPT

## 2024-07-11 PROCEDURE — 85007 BL SMEAR W/DIFF WBC COUNT: CPT

## 2024-07-11 PROCEDURE — 86140 C-REACTIVE PROTEIN: CPT

## 2024-07-11 PROCEDURE — G0103 PSA SCREENING: HCPCS

## 2024-07-11 PROCEDURE — 80053 COMPREHEN METABOLIC PANEL: CPT

## 2024-07-11 PROCEDURE — 80061 LIPID PANEL: CPT

## 2024-07-11 PROCEDURE — 85025 COMPLETE CBC W/AUTO DIFF WBC: CPT

## 2024-07-11 PROCEDURE — 83036 HEMOGLOBIN GLYCOSYLATED A1C: CPT

## 2024-07-11 PROCEDURE — 36415 COLL VENOUS BLD VENIPUNCTURE: CPT

## 2024-07-16 ENCOUNTER — OFFICE VISIT (OUTPATIENT)
Dept: INFECTIOUS DISEASES | Facility: CLINIC | Age: 59
End: 2024-07-16
Payer: MEDICAID

## 2024-07-16 VITALS
DIASTOLIC BLOOD PRESSURE: 74 MMHG | HEIGHT: 70 IN | BODY MASS INDEX: 35.79 KG/M2 | WEIGHT: 250 LBS | OXYGEN SATURATION: 98 % | HEART RATE: 77 BPM | SYSTOLIC BLOOD PRESSURE: 152 MMHG

## 2024-07-16 DIAGNOSIS — K74.69 OTHER CIRRHOSIS OF LIVER: ICD-10-CM

## 2024-07-16 DIAGNOSIS — M00.061 STAPHYLOCOCCAL ARTHRITIS OF RIGHT KNEE: Primary | ICD-10-CM

## 2024-07-16 DIAGNOSIS — I07.9 ENDOCARDITIS OF TRICUSPID VALVE: ICD-10-CM

## 2024-07-16 NOTE — PROGRESS NOTES
[facility]         Referring Provider: No referring provider defined for this encounter.    Subjective      Chief Complaint  No chief complaint on file.    History of Present Illness  Melchor Hardwick III is a 59 y.o. male who presents today to [facility] for Follow Up .     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 No fever, no chills and no night sweats. I reviewed labs from last week..     Pain is about the same, no fever, no chills and no erythema, no swelling of the knee. No clinical sepsis. Platelets are running little lower than usual. No fever and no chills.    Taking doxycycline 100 mg one po daily. No adverse reactions.    Past Medical History:   Diagnosis Date    Diabetes mellitus     4 times per day gets checked for sugar at rehab    Dizzy     Hyperlipidemia     Hypertension     Hypothyroid 11/30/2022    MRSA infection     blood -   2022    Orthostatic hypotension     Pressure sore on buttocks     top crack -  sees wound - but now rn take care of it at rehab - minor opening - dime size - pat nurse didnt assess-  pt states getting better    Pyogenic arthritis of right knee joint, due to unspecified organism 09/21/2022    Sepsis     Wears glasses     readers       Past Surgical History:   Procedure Laterality Date    ABSCESS DRAINAGE  2004    PERINEUM    AMPUTATION FOOT Left 05/18/2022    Procedure: AMPUTATION FOOT;  Surgeon: Addison Colby MD;  Location: [facility] OR;  Service: Podiatry;  Laterality: Left;    INCISION AND DRAINAGE LEG Left 05/10/2022    Procedure: INCISION AND DRAINAGE LOWER EXTREMITY;  Surgeon: Addison Colby MD;  Location: [facility] OR;  Service: Podiatry;  Laterality: Left;    KNEE INCISION AND DRAINAGE Right 09/21/2022    Procedure: KNEE INCISION AND DRAINAGE RIGHT;  Surgeon: Brain Bentley MD;  Location: [facility] OR;  Service: Orthopedics;  Laterality: Right;     PERIPHERALLY INSERTED CENTRAL CATHETER INSERTION Left     TOTAL KNEE  PROSTHESIS REMOVAL W/ SPACER INSERTION Right 11/30/2022    Procedure: ANTIBIOTIC SPACER PLACEMENT KNEE - RIGHT;  Surgeon: Brain Bentley MD;  Location:  [facility] OR;  Service: Orthopedics;  Laterality: Right;    WOUND DEBRIDEMENT Left 05/13/2022    Procedure: DEBRIDEMENT FOOT;  Surgeon: Addison Colby MD;  Location:  [facility] OR;  Service: Podiatry;  Laterality: Left;       Social History     Socioeconomic History    Marital status: Single   Tobacco Use    Smoking status: Never    Smokeless tobacco: Never   Vaping Use    Vaping status: Never Used   Substance and Sexual Activity    Alcohol use: Never    Drug use: Never    Sexual activity: Defer       Family History  family history is not on file.      There is no immunization history on file for this patient.     Allergies  No Known Allergies    The medication list has been reviewed and updated.   Current Medications    Current Outpatient Medications:     acetaminophen (TYLENOL) 325 MG tablet, Take 2 tablets by mouth Every 4 (Four) Hours As Needed for Mild Pain., Disp: , Rfl:     ascorbic acid (VITAMIN C) 500 MG tablet, Take 1 tablet by mouth Daily., Disp: , Rfl:     bisacodyl (DULCOLAX) 10 MG suppository, Insert 1 suppository into the rectum Daily As Needed for Constipation., Disp: , Rfl:     cyclobenzaprine (FLEXERIL) 5 MG tablet, Take 1 tablet by mouth 3 (Three) Times a Day As Needed for Muscle Spasms., Disp: , Rfl:     Diclofenac Sodium (VOLTAREN) 1 % gel gel, Apply 4 g topically to the appropriate area as directed 4 (Four) Times a Day As Needed., Disp: , Rfl:     docusate sodium 100 MG capsule, Take 1 capsule by mouth 2 (Two) Times a Day., Disp: , Rfl:     doxycycline (VIBRAMYICN) 100 MG tablet, Take 1 tablet by mouth Daily., Disp: , Rfl:     fludrocortisone 0.1 MG tablet, Take 0.5 tablets by mouth Daily., Disp: , Rfl:     Insulin Aspart (novoLOG) 100 UNIT/ML injection, Inject 0-14 Units under  the skin into the appropriate area as directed 3 (Three) Times a Day Before Meals., Disp: , Rfl: 12    Insulin Aspart (novoLOG) 100 UNIT/ML injection, Inject 4 Units under the skin into the appropriate area as directed 3 (Three) Times a Day Before Meals., Disp: , Rfl: 12    insulin detemir (LEVEMIR) 100 UNIT/ML injection, Inject 17 Units under the skin into the appropriate area as directed Every 12 (Twelve) Hours., Disp: , Rfl: 12    levothyroxine (SYNTHROID, LEVOTHROID) 50 MCG tablet, Take 1 tablet by mouth Daily., Disp: , Rfl:     meloxicam (MOBIC) 15 MG tablet, Take 1 tablet by mouth Daily. (Patient not taking: Reported on 7/11/2023), Disp: , Rfl:     midodrine (PROAMATINE) 2.5 MG tablet, Take 1 tablet by mouth 3 (Three) Times a Day Before Meals. Hold if SBP is above 120 (Patient not taking: Reported on 7/11/2023), Disp: , Rfl:     multivitamin with minerals tablet tablet, Take 1 tablet by mouth Daily., Disp: , Rfl:     pantoprazole (PROTONIX) 40 MG EC tablet, Take 1 tablet by mouth Every Morning., Disp: , Rfl:     polyethylene glycol (MIRALAX) 17 g packet, Take 17 g by mouth Daily., Disp: , Rfl:     pregabalin (LYRICA) 100 MG capsule, Take 1 capsule by mouth Every 12 (Twelve) Hours. (Patient taking differently: Take 1 capsule by mouth Every 12 (Twelve) Hours. Pt states he is taking 25 mg), Disp: , Rfl:     Vitamins A & D (magic barrier cream), Apply 1 application topically to the appropriate area as directed As Needed (skin irritation)., Disp: , Rfl:       Review of Systems    Review of Systems   Constitutional:  Negative for activity change, appetite change, chills, diaphoresis and fever.   HENT:  Negative for congestion, dental problem, drooling, mouth sores, sinus pressure and sneezing.    Eyes:  Negative for blurred vision, double vision, photophobia and discharge.   Respiratory:  Negative for apnea, cough, choking, chest tightness, shortness of breath and wheezing.    Cardiovascular:  Negative for chest  "pain, palpitations and leg swelling.   Gastrointestinal:  Negative for abdominal distention, abdominal pain, diarrhea, nausea and vomiting.   Genitourinary:  Negative for dysuria, flank pain and frequency.   Musculoskeletal:  Negative for arthralgias, gait problem, neck pain and neck stiffness.   Skin:  Negative for rash.   Neurological:  Negative for dizziness, tremors, seizures, syncope, speech difficulty, numbness, headache and confusion.   Psychiatric/Behavioral:  Negative for agitation, behavioral problems, hallucinations and suicidal ideas.         Objective     Vital Signs:  There were no vitals taken for this visit.  Estimated body mass index is 35.1 kg/m² as calculated from the following:    Height as of 6/4/24: 177.8 cm (70\").    Weight as of 6/4/24: 111 kg (244 lb 9.6 oz).    Physical Exam  Constitutional:       General: He is not in acute distress.     Appearance: Normal appearance. He is normal weight. He is not ill-appearing, toxic-appearing or diaphoretic.   HENT:      Head: Normocephalic and atraumatic.      Nose: Nose normal. No congestion or rhinorrhea.      Mouth/Throat:      Mouth: Mucous membranes are moist.      Pharynx: Oropharynx is clear.   Eyes:      General: No scleral icterus.     Extraocular Movements: Extraocular movements intact.      Pupils: Pupils are equal, round, and reactive to light.   Neck:      Vascular: No carotid bruit.   Cardiovascular:      Rate and Rhythm: Normal rate and regular rhythm.      Pulses: Normal pulses.      Heart sounds: No murmur heard.  Pulmonary:      Effort: Pulmonary effort is normal. No respiratory distress.      Breath sounds: Normal breath sounds. No stridor. No wheezing, rhonchi or rales.   Chest:      Chest wall: No tenderness.   Abdominal:      General: Abdomen is flat. Bowel sounds are normal. There is no distension.      Palpations: Abdomen is soft.      Tenderness: There is no abdominal tenderness. There is no guarding or rebound. " "  Musculoskeletal:      Cervical back: Normal range of motion and neck supple. No rigidity or tenderness.   Lymphadenopathy:      Cervical: No cervical adenopathy.   Skin:     Coloration: Skin is not jaundiced.      Findings: No lesion or rash.   Neurological:      General: No focal deficit present.      Mental Status: He is alert and oriented to person, place, and time.   Psychiatric:         Mood and Affect: Mood normal.         Behavior: Behavior normal.          Result Review :  The following data was reviewed by Marilynn Giordano MD     Lab Results  Lab Results   Component Value Date    WBC 2.69 (L) 07/11/2024    HGB 13.2 07/11/2024    HCT 40.9 07/11/2024    MCV 85.6 07/11/2024    PLT 44 (C) 07/11/2024     Lab Results   Component Value Date    GLUCOSE 128 (H) 07/11/2024    BUN 12 07/11/2024    CREATININE 0.92 07/11/2024    BCR 13.0 07/11/2024    K 4.2 07/11/2024    CO2 21.0 (L) 07/11/2024    CALCIUM 8.6 07/11/2024    ALBUMIN 3.9 07/11/2024    AST 47 (H) 07/11/2024    ALT 34 07/11/2024      Lab Results   Component Value Date    CRP 0.51 (H) 07/11/2024        No results found for: \"ACANTHNAEG\", \"AFBCX\", \"BPERTUSSISCX\", \"BLOODCX\"  No results found for: \"BCIDPCR\", \"CXREFLEX\", \"CSFCX\", \"CULTURETIS\"  No results found for: \"CULTURES\", \"HSVCX\", \"URCX\"  No results found for: \"EYECULTURE\", \"GCCX\", \"HSVCULTURE\", \"LABHSV\"  No results found for: \"LEGIONELLA\", \"MRSACX\", \"MUMPSCX\", \"MYCOPLASCX\"  No results found for: \"NOCARDIACX\", \"STOOLCX\"  No results found for: \"THROATCX\", \"UNSTIMCULT\", \"URINECX\", \"CULTURE\", \"VZVCULTUR\"  No results found for: \"VIRALCULTU\", \"WOUNDCX\"    Radiology Results              Assessment / Plan        Diagnoses and all orders for this visit:    1. Staphylococcal arthritis of right knee (Primary)    2. Other cirrhosis of liver    3. Endocarditis of tricuspid valve      Patient is doing great with no complaints and no evidence of clinical sepsis. Will follow closely.     Continue with oral lifelong " suppressive therapy with doxycycline 100 mg daily that was decreased from bid on 6/6/23.     I reviewed labs 7/11/24 with WBC 2.69, PLT 44 and CRP improving at 0.51. A1C slightly worse at 8.3%.     Plt are lower than usual at 47 so will recheck soon. No bleeding.     Consider compression stockings to the right lower extremity.     Will recheck labs in 6 weeks.    I discussed sugar control and diet restrictions.          Follow Up   Return in about 6 weeks (around 8/27/2024) for Follow up, With Dr. Giordano.    Visit Diagnoses:    ICD-10-CM ICD-9-CM   1. Staphylococcal arthritis of right knee  M00.061 711.06     041.10   2. Other cirrhosis of liver  K74.69 571.5   3. Endocarditis of tricuspid valve  I07.9 424.2       Patient was given instructions and counseling regarding his condition or for health maintenance advice. Please see specific information pulled into the AVS if appropriate.     This document has been electronically signed by Marilynn Giordano MD   July 16, 2024 09:01 EDT      No orders of the defined types were placed in this encounter.     Dictated Utilizing Dragon Dictation: Part of this note may be an electronic transcription/translation of spoken language to printed text using the Dragon Dictation System.

## 2024-08-22 ENCOUNTER — LAB (OUTPATIENT)
Dept: LAB | Facility: HOSPITAL | Age: 59
End: 2024-08-22
Payer: MEDICAID

## 2024-08-22 DIAGNOSIS — K74.69 OTHER CIRRHOSIS OF LIVER: ICD-10-CM

## 2024-08-22 DIAGNOSIS — I07.9 ENDOCARDITIS OF TRICUSPID VALVE: ICD-10-CM

## 2024-08-22 DIAGNOSIS — M00.061 STAPHYLOCOCCAL ARTHRITIS OF RIGHT KNEE: ICD-10-CM

## 2024-08-22 LAB — CRP SERPL-MCNC: 0.62 MG/DL (ref 0–0.5)

## 2024-08-22 PROCEDURE — 86140 C-REACTIVE PROTEIN: CPT

## 2024-08-22 PROCEDURE — 36415 COLL VENOUS BLD VENIPUNCTURE: CPT

## 2024-08-27 ENCOUNTER — OFFICE VISIT (OUTPATIENT)
Dept: INFECTIOUS DISEASES | Facility: CLINIC | Age: 59
End: 2024-08-27
Payer: MEDICAID

## 2024-08-27 VITALS
SYSTOLIC BLOOD PRESSURE: 153 MMHG | HEART RATE: 83 BPM | OXYGEN SATURATION: 95 % | HEIGHT: 70 IN | BODY MASS INDEX: 35.33 KG/M2 | WEIGHT: 246.8 LBS | DIASTOLIC BLOOD PRESSURE: 76 MMHG

## 2024-08-27 DIAGNOSIS — Z96.651 STATUS POST TOTAL RIGHT KNEE REPLACEMENT: ICD-10-CM

## 2024-08-27 DIAGNOSIS — R78.81 MRSA BACTEREMIA: ICD-10-CM

## 2024-08-27 DIAGNOSIS — B95.62 MRSA BACTEREMIA: ICD-10-CM

## 2024-08-27 DIAGNOSIS — I07.9 ENDOCARDITIS OF TRICUSPID VALVE: ICD-10-CM

## 2024-08-27 DIAGNOSIS — M00.061 STAPHYLOCOCCAL ARTHRITIS OF RIGHT KNEE: Primary | ICD-10-CM

## 2024-08-27 NOTE — PROGRESS NOTES
[facility]         Referring Provider: Missy Up APRN  [address] [place] [address]    Subjective      Chief Complaint  Follow-up (Staphylococcal arthritis of right knee)    Follow-upCurrent symptoms include no chest pain, no confusion, no dizziness, no nausea, no palpitations, no shortness of breath, no blurred vision, no neck pain, no wheezing, no abdominal pain, no choking and no cough.     Melchor Hardwick III is a 59 y.o. male who presents today to [facility] for Follow Up .       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 No fever, no chills and no night sweats. I reviewed labs from last week..     Pain is about the same, no fever, no chills and no erythema, no swelling of the knee. No clinical sepsis. Platelets are running little lower than usual. No fever and no chills.     Taking doxycycline 100 mg one po daily. No adverse reactions.    Past Medical History:   Diagnosis Date    Diabetes mellitus     4 times per day gets checked for sugar at rehab    Dizzy     Hyperlipidemia     Hypertension     Hypothyroid 11/30/2022    MRSA infection     blood -   2022    Orthostatic hypotension     Pressure sore on buttocks     top crack -  sees wound - but now rn take care of it at rehab - minor opening - dime size - pat nurse didnt assess-  pt states getting better    Pyogenic arthritis of right knee joint, due to unspecified organism 09/21/2022    Sepsis     Wears glasses     readers       Past Surgical History:   Procedure Laterality Date    ABSCESS DRAINAGE  2004    PERINEUM    AMPUTATION FOOT Left 05/18/2022    Procedure: AMPUTATION FOOT;  Surgeon: Addison Colby MD;  Location: [facility];  Service: Podiatry;  Laterality: Left;    INCISION AND DRAINAGE LEG Left 05/10/2022    Procedure: INCISION AND DRAINAGE LOWER EXTREMITY;  Surgeon: Addison Colby MD;  Location: [facility] OR;  Service:  Podiatry;  Laterality: Left;    KNEE INCISION AND DRAINAGE Right 09/21/2022    Procedure: KNEE INCISION AND DRAINAGE RIGHT;  Surgeon: Brain Bentley MD;  Location:  [facility] OR;  Service: Orthopedics;  Laterality: Right;    PERIPHERALLY INSERTED CENTRAL CATHETER INSERTION Left     TOTAL KNEE  PROSTHESIS REMOVAL W/ SPACER INSERTION Right 11/30/2022    Procedure: ANTIBIOTIC SPACER PLACEMENT KNEE - RIGHT;  Surgeon: Brain Bentley MD;  Location:  [facility] OR;  Service: Orthopedics;  Laterality: Right;    WOUND DEBRIDEMENT Left 05/13/2022    Procedure: DEBRIDEMENT FOOT;  Surgeon: Addison Colby MD;  Location:  [facility] OR;  Service: Podiatry;  Laterality: Left;       Social History     Socioeconomic History    Marital status: Single   Tobacco Use    Smoking status: Never    Smokeless tobacco: Never   Vaping Use    Vaping status: Never Used   Substance and Sexual Activity    Alcohol use: Never    Drug use: Never    Sexual activity: Defer       Family History  family history is not on file.      There is no immunization history on file for this patient.     Allergies  No Known Allergies    The medication list has been reviewed and updated.   Current Medications    Current Outpatient Medications:     acetaminophen (TYLENOL) 325 MG tablet, Take 2 tablets by mouth Every 4 (Four) Hours As Needed for Mild Pain., Disp: , Rfl:     ascorbic acid (VITAMIN C) 500 MG tablet, Take 1 tablet by mouth Daily., Disp: , Rfl:     bisacodyl (DULCOLAX) 10 MG suppository, Insert 1 suppository into the rectum Daily As Needed for Constipation., Disp: , Rfl:     cyclobenzaprine (FLEXERIL) 5 MG tablet, Take 1 tablet by mouth 3 (Three) Times a Day As Needed for Muscle Spasms., Disp: , Rfl:     Diclofenac Sodium (VOLTAREN) 1 % gel gel, Apply 4 g topically to the appropriate area as directed 4 (Four) Times a Day As Needed., Disp: , Rfl:     docusate sodium 100 MG capsule, Take 1 capsule by mouth 2 (Two) Times a Day., Disp: , Rfl:     doxycycline  (VIBRAMYICN) 100 MG tablet, Take 1 tablet by mouth Daily., Disp: , Rfl:     fludrocortisone 0.1 MG tablet, Take 0.5 tablets by mouth Daily., Disp: , Rfl:     Insulin Aspart (novoLOG) 100 UNIT/ML injection, Inject 0-14 Units under the skin into the appropriate area as directed 3 (Three) Times a Day Before Meals., Disp: , Rfl: 12    Insulin Aspart (novoLOG) 100 UNIT/ML injection, Inject 4 Units under the skin into the appropriate area as directed 3 (Three) Times a Day Before Meals., Disp: , Rfl: 12    insulin detemir (LEVEMIR) 100 UNIT/ML injection, Inject 17 Units under the skin into the appropriate area as directed Every 12 (Twelve) Hours., Disp: , Rfl: 12    levothyroxine (SYNTHROID, LEVOTHROID) 50 MCG tablet, Take 1 tablet by mouth Daily., Disp: , Rfl:     meloxicam (MOBIC) 15 MG tablet, Take 1 tablet by mouth Daily. (Patient not taking: Reported on 7/11/2023), Disp: , Rfl:     midodrine (PROAMATINE) 2.5 MG tablet, Take 1 tablet by mouth 3 (Three) Times a Day Before Meals. Hold if SBP is above 120 (Patient not taking: Reported on 7/11/2023), Disp: , Rfl:     multivitamin with minerals tablet tablet, Take 1 tablet by mouth Daily., Disp: , Rfl:     pantoprazole (PROTONIX) 40 MG EC tablet, Take 1 tablet by mouth Every Morning., Disp: , Rfl:     polyethylene glycol (MIRALAX) 17 g packet, Take 17 g by mouth Daily., Disp: , Rfl:     pregabalin (LYRICA) 100 MG capsule, Take 1 capsule by mouth Every 12 (Twelve) Hours. (Patient taking differently: Take 1 capsule by mouth Every 12 (Twelve) Hours. Pt states he is taking 25 mg), Disp: , Rfl:     Vitamins A & D (magic barrier cream), Apply 1 application topically to the appropriate area as directed As Needed (skin irritation)., Disp: , Rfl:       Review of Systems    Review of Systems   Constitutional:  Negative for activity change, appetite change, chills, diaphoresis and fever.   HENT:  Negative for congestion, dental problem, drooling, mouth sores, sinus pressure and  "sneezing.    Eyes:  Negative for blurred vision, double vision, photophobia and discharge.   Respiratory:  Negative for apnea, cough, choking, chest tightness, shortness of breath and wheezing.    Cardiovascular:  Negative for chest pain, palpitations and leg swelling.   Gastrointestinal:  Negative for abdominal distention, abdominal pain, diarrhea, nausea and vomiting.   Genitourinary:  Negative for dysuria, flank pain and frequency.   Musculoskeletal:  Negative for arthralgias, gait problem, neck pain and neck stiffness.   Skin:  Negative for rash.   Neurological:  Negative for dizziness, tremors, seizures, syncope, speech difficulty, numbness, headache and confusion.   Psychiatric/Behavioral:  Negative for agitation, behavioral problems, hallucinations and suicidal ideas.         Objective     Vital Signs:  /76 (BP Location: Right arm, Patient Position: Sitting, Cuff Size: Small Adult)   Pulse 83   Ht 177.8 cm (70\")   Wt 112 kg (246 lb 12.8 oz)   SpO2 95%   BMI 35.41 kg/m²   Estimated body mass index is 35.41 kg/m² as calculated from the following:    Height as of this encounter: 177.8 cm (70\").    Weight as of this encounter: 112 kg (246 lb 12.8 oz).    Physical Exam  Constitutional:       General: He is not in acute distress.     Appearance: Normal appearance. He is normal weight. He is not ill-appearing, toxic-appearing or diaphoretic.   HENT:      Head: Normocephalic and atraumatic.      Nose: Nose normal. No congestion or rhinorrhea.      Mouth/Throat:      Mouth: Mucous membranes are moist.      Pharynx: Oropharynx is clear.   Eyes:      General: No scleral icterus.     Extraocular Movements: Extraocular movements intact.      Pupils: Pupils are equal, round, and reactive to light.   Neck:      Vascular: No carotid bruit.   Cardiovascular:      Rate and Rhythm: Normal rate and regular rhythm.      Pulses: Normal pulses.      Heart sounds: No murmur heard.  Pulmonary:      Effort: Pulmonary effort " "is normal. No respiratory distress.      Breath sounds: Normal breath sounds. No stridor. No wheezing, rhonchi or rales.   Chest:      Chest wall: No tenderness.   Abdominal:      General: Abdomen is flat. Bowel sounds are normal. There is no distension.      Palpations: Abdomen is soft.      Tenderness: There is no abdominal tenderness. There is no guarding or rebound.   Musculoskeletal:      Cervical back: Normal range of motion and neck supple. No rigidity or tenderness.   Lymphadenopathy:      Cervical: No cervical adenopathy.   Skin:     Coloration: Skin is not jaundiced.      Findings: No lesion or rash.   Neurological:      General: No focal deficit present.      Mental Status: He is alert and oriented to person, place, and time.   Psychiatric:         Mood and Affect: Mood normal.         Behavior: Behavior normal.          Result Review :  The following data was reviewed by Marilynn Giordano MD     Lab Results  Lab Results   Component Value Date    WBC 2.69 (L) 07/11/2024    HGB 13.2 07/11/2024    HCT 40.9 07/11/2024    MCV 85.6 07/11/2024    PLT 44 (C) 07/11/2024     Lab Results   Component Value Date    GLUCOSE 128 (H) 07/11/2024    BUN 12 07/11/2024    CREATININE 0.92 07/11/2024    BCR 13.0 07/11/2024    K 4.2 07/11/2024    CO2 21.0 (L) 07/11/2024    CALCIUM 8.6 07/11/2024    ALBUMIN 3.9 07/11/2024    AST 47 (H) 07/11/2024    ALT 34 07/11/2024      Lab Results   Component Value Date    CRP 0.62 (H) 08/22/2024        No results found for: \"ACANTHNAEG\", \"AFBCX\", \"BPERTUSSISCX\", \"BLOODCX\"  No results found for: \"BCIDPCR\", \"CXREFLEX\", \"CSFCX\", \"CULTURETIS\"  No results found for: \"CULTURES\", \"HSVCX\", \"URCX\"  No results found for: \"EYECULTURE\", \"GCCX\", \"HSVCULTURE\", \"LABHSV\"  No results found for: \"LEGIONELLA\", \"MRSACX\", \"MUMPSCX\", \"MYCOPLASCX\"  No results found for: \"NOCARDIACX\", \"STOOLCX\"  No results found for: \"THROATCX\", \"UNSTIMCULT\", \"URINECX\", \"CULTURE\", \"VZVCULTUR\"  No results found for: \"VIRALCULTU\", " "\"WOUNDCX\"    Radiology Results              Assessment / Plan        Diagnoses and all orders for this visit:    1. Staphylococcal arthritis of right knee (Primary)  -     C-reactive Protein; Future    2. Endocarditis of tricuspid valve    3. MRSA bacteremia    4. Status post total right knee replacement        Patient is doing great with no complaints and no evidence of clinical sepsis. Will follow closely.     Continue with oral lifelong suppressive therapy with doxycycline 100 mg daily that was decreased from bid on 6/6/23.     CRP remains stable at 0.62.  PCP is following on CBC and CMP.     Consider compression stockings to the right lower extremity.     Will recheck labs in 8 weeks on 10/22/24 and CRP on 10/18/24.     I discussed sugar control and diet restrictions.        Follow Up   Return in about 6 weeks (around 10/8/2024) for Follow up, With Dr. Giordano.    Visit Diagnoses:    ICD-10-CM ICD-9-CM   1. Staphylococcal arthritis of right knee  M00.061 711.06     041.10   2. Endocarditis of tricuspid valve  I07.9 424.2   3. MRSA bacteremia  R78.81 790.7    B95.62 041.12   4. Status post total right knee replacement  Z96.651 V43.65       Patient was given instructions and counseling regarding his condition or for health maintenance advice. Please see specific information pulled into the AVS if appropriate.     This document has been electronically signed by Marilynn Giordano MD   August 27, 2024 08:42 EDT      No orders of the defined types were placed in this encounter.     Dictated Utilizing Dragon Dictation: Part of this note may be an electronic transcription/translation of spoken language to printed text using the Dragon Dictation System.    "

## 2024-10-17 ENCOUNTER — LAB (OUTPATIENT)
Dept: LAB | Facility: HOSPITAL | Age: 59
End: 2024-10-17
Payer: MEDICAID

## 2024-10-17 DIAGNOSIS — M00.061 STAPHYLOCOCCAL ARTHRITIS OF RIGHT KNEE: ICD-10-CM

## 2024-10-17 LAB — CRP SERPL-MCNC: 0.68 MG/DL (ref 0–0.5)

## 2024-10-17 PROCEDURE — 86140 C-REACTIVE PROTEIN: CPT

## 2024-10-17 PROCEDURE — 36415 COLL VENOUS BLD VENIPUNCTURE: CPT

## 2024-10-22 ENCOUNTER — OFFICE VISIT (OUTPATIENT)
Dept: INFECTIOUS DISEASES | Facility: CLINIC | Age: 59
End: 2024-10-22
Payer: MEDICAID

## 2024-10-22 VITALS
BODY MASS INDEX: 35.42 KG/M2 | DIASTOLIC BLOOD PRESSURE: 84 MMHG | HEART RATE: 90 BPM | WEIGHT: 247.4 LBS | OXYGEN SATURATION: 96 % | HEIGHT: 70 IN | RESPIRATION RATE: 18 BRPM | SYSTOLIC BLOOD PRESSURE: 168 MMHG

## 2024-10-22 DIAGNOSIS — K74.69 OTHER CIRRHOSIS OF LIVER: ICD-10-CM

## 2024-10-22 DIAGNOSIS — R78.81 MRSA BACTEREMIA: ICD-10-CM

## 2024-10-22 DIAGNOSIS — M00.061 STAPHYLOCOCCAL ARTHRITIS OF RIGHT KNEE: ICD-10-CM

## 2024-10-22 DIAGNOSIS — I07.9 ENDOCARDITIS OF TRICUSPID VALVE: Primary | ICD-10-CM

## 2024-10-22 DIAGNOSIS — Z96.651 STATUS POST TOTAL RIGHT KNEE REPLACEMENT: ICD-10-CM

## 2024-10-22 DIAGNOSIS — B95.62 MRSA BACTEREMIA: ICD-10-CM

## 2024-10-22 NOTE — PROGRESS NOTES
[facility]         Referring Provider: No referring provider defined for this encounter.    Subjective      Chief Complaint  Follow-up (Staphylococcal arthritis of Right knee )    Follow-upPertinent negatives include no chest pain, no confusion, no dizziness, no nausea, no palpitations, no shortness of breath, no blurred vision, no neck pain, no wheezing, no abdominal pain, no choking, no cough, no diaphoresis, no diarrhea and no tremors.     Melchor Hardwick III is a 59 y.o. male who presents today to [facility] for Follow Up .    Melchor Hardwick III is a 59 y.o. male who presents today to [facility] for Follow Up .        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 No fever, no chills and no night sweats. I reviewed labs from last week..     Pain is about the same, no fever, no chills and no erythema, no swelling of the knee. No clinical sepsis. Platelets are running little lower than usual. No fever and no chills.     Taking doxycycline 100 mg one po daily. No adverse reactions.    Past Medical History:   Diagnosis Date    Diabetes mellitus     4 times per day gets checked for sugar at rehab    Dizzy     Hyperlipidemia     Hypertension     Hypothyroid 11/30/2022    MRSA infection     blood -   2022    Orthostatic hypotension     Pressure sore on buttocks     top crack -  sees wound - but now rn take care of it at rehab - minor opening - dime size - pat nurse didnt assess-  pt states getting better    Pyogenic arthritis of right knee joint, due to unspecified organism 09/21/2022    Sepsis     Wears glasses     readers       Past Surgical History:   Procedure Laterality Date    ABSCESS DRAINAGE  2004    PERINEUM    AMPUTATION FOOT Left 05/18/2022    Procedure: AMPUTATION FOOT;  Surgeon: Addison Colby MD;  Location: [facility];  Service: Podiatry;  Laterality:  Left;    INCISION AND DRAINAGE LEG Left 05/10/2022    Procedure: INCISION AND DRAINAGE LOWER EXTREMITY;  Surgeon: Addison Colby MD;  Location:  [facility];  Service: Podiatry;  Laterality: Left;    KNEE INCISION AND DRAINAGE Right 09/21/2022    Procedure: KNEE INCISION AND DRAINAGE RIGHT;  Surgeon: Brain Bentley MD;  Location:  [facility];  Service: Orthopedics;  Laterality: Right;    PERIPHERALLY INSERTED CENTRAL CATHETER INSERTION Left     TOTAL KNEE  PROSTHESIS REMOVAL W/ SPACER INSERTION Right 11/30/2022    Procedure: ANTIBIOTIC SPACER PLACEMENT KNEE - RIGHT;  Surgeon: Brain Bentley MD;  Location:  [facility];  Service: Orthopedics;  Laterality: Right;    WOUND DEBRIDEMENT Left 05/13/2022    Procedure: DEBRIDEMENT FOOT;  Surgeon: Addison Colby MD;  Location:  [facility];  Service: Podiatry;  Laterality: Left;       Social History     Socioeconomic History    Marital status: Single   Tobacco Use    Smoking status: Never    Smokeless tobacco: Never   Vaping Use    Vaping status: Never Used   Substance and Sexual Activity    Alcohol use: Never    Drug use: Never    Sexual activity: Defer       Family History  family history is not on file.      There is no immunization history on file for this patient.     Allergies  No Known Allergies    The medication list has been reviewed and updated.   Current Medications    Current Outpatient Medications:     acetaminophen (TYLENOL) 325 MG tablet, Take 2 tablets by mouth Every 4 (Four) Hours As Needed for Mild Pain., Disp: , Rfl:     ascorbic acid (VITAMIN C) 500 MG tablet, Take 1 tablet by mouth Daily., Disp: , Rfl:     bisacodyl (DULCOLAX) 10 MG suppository, Insert 1 suppository into the rectum Daily As Needed for Constipation., Disp: , Rfl:     cyclobenzaprine (FLEXERIL) 5 MG tablet, Take 1 tablet by mouth 3 (Three) Times a Day As Needed for Muscle Spasms., Disp: , Rfl:     Diclofenac Sodium (VOLTAREN) 1 % gel gel, Apply 4 g topically to the appropriate area as  directed 4 (Four) Times a Day As Needed., Disp: , Rfl:     docusate sodium 100 MG capsule, Take 1 capsule by mouth 2 (Two) Times a Day., Disp: , Rfl:     doxycycline (VIBRAMYICN) 100 MG tablet, Take 1 tablet by mouth Daily., Disp: , Rfl:     fludrocortisone 0.1 MG tablet, Take 0.5 tablets by mouth Daily., Disp: , Rfl:     Insulin Aspart (novoLOG) 100 UNIT/ML injection, Inject 0-14 Units under the skin into the appropriate area as directed 3 (Three) Times a Day Before Meals., Disp: , Rfl: 12    Insulin Aspart (novoLOG) 100 UNIT/ML injection, Inject 4 Units under the skin into the appropriate area as directed 3 (Three) Times a Day Before Meals., Disp: , Rfl: 12    insulin detemir (LEVEMIR) 100 UNIT/ML injection, Inject 17 Units under the skin into the appropriate area as directed Every 12 (Twelve) Hours., Disp: , Rfl: 12    levothyroxine (SYNTHROID, LEVOTHROID) 50 MCG tablet, Take 1 tablet by mouth Daily., Disp: , Rfl:     meloxicam (MOBIC) 15 MG tablet, Take 1 tablet by mouth Daily., Disp: , Rfl:     multivitamin with minerals tablet tablet, Take 1 tablet by mouth Daily., Disp: , Rfl:     pantoprazole (PROTONIX) 40 MG EC tablet, Take 1 tablet by mouth Every Morning., Disp: , Rfl:     polyethylene glycol (MIRALAX) 17 g packet, Take 17 g by mouth Daily., Disp: , Rfl:     pregabalin (LYRICA) 100 MG capsule, Take 1 capsule by mouth Every 12 (Twelve) Hours. (Patient taking differently: Take 1 capsule by mouth Every 12 (Twelve) Hours. Pt states he is taking 25 mg), Disp: , Rfl:     midodrine (PROAMATINE) 2.5 MG tablet, Take 1 tablet by mouth 3 (Three) Times a Day Before Meals. Hold if SBP is above 120 (Patient not taking: Reported on 10/22/2024), Disp: , Rfl:     Vitamins A & D (magic barrier cream), Apply 1 application topically to the appropriate area as directed As Needed (skin irritation)., Disp: , Rfl:       Review of Systems    Review of Systems   Constitutional:  Negative for activity change, appetite change,  "chills, diaphoresis and fever.   HENT:  Negative for congestion, dental problem, drooling, mouth sores, sinus pressure and sneezing.    Eyes:  Negative for blurred vision, double vision, photophobia and discharge.   Respiratory:  Negative for apnea, cough, choking, chest tightness, shortness of breath and wheezing.    Cardiovascular:  Negative for chest pain, palpitations and leg swelling.   Gastrointestinal:  Negative for abdominal distention, abdominal pain, diarrhea, nausea and vomiting.   Genitourinary:  Negative for dysuria, flank pain and frequency.   Musculoskeletal:  Negative for arthralgias, gait problem, neck pain and neck stiffness.   Skin:  Negative for rash.   Neurological:  Negative for dizziness, tremors, seizures, syncope, speech difficulty, numbness, headache and confusion.   Psychiatric/Behavioral:  Negative for agitation, behavioral problems, hallucinations and suicidal ideas.         Objective     Vital Signs:  /84 (BP Location: Left arm, Patient Position: Sitting, Cuff Size: Adult)   Pulse 90   Resp 18   Ht 177.8 cm (70\")   Wt 112 kg (247 lb 6.4 oz)   SpO2 96%   BMI 35.50 kg/m²   Estimated body mass index is 35.5 kg/m² as calculated from the following:    Height as of this encounter: 177.8 cm (70\").    Weight as of this encounter: 112 kg (247 lb 6.4 oz).    Physical Exam  Constitutional:       General: He is not in acute distress.     Appearance: Normal appearance. He is normal weight. He is not ill-appearing, toxic-appearing or diaphoretic.   HENT:      Head: Normocephalic and atraumatic.      Nose: Nose normal. No congestion or rhinorrhea.      Mouth/Throat:      Mouth: Mucous membranes are moist.      Pharynx: Oropharynx is clear.   Eyes:      General: No scleral icterus.     Extraocular Movements: Extraocular movements intact.      Pupils: Pupils are equal, round, and reactive to light.   Neck:      Vascular: No carotid bruit.   Cardiovascular:      Rate and Rhythm: Normal rate " "and regular rhythm.      Pulses: Normal pulses.      Heart sounds: No murmur heard.  Pulmonary:      Effort: Pulmonary effort is normal. No respiratory distress.      Breath sounds: Normal breath sounds. No stridor. No wheezing, rhonchi or rales.   Chest:      Chest wall: No tenderness.   Abdominal:      General: Abdomen is flat. Bowel sounds are normal. There is no distension.      Palpations: Abdomen is soft.      Tenderness: There is no abdominal tenderness. There is no guarding or rebound.   Musculoskeletal:      Cervical back: Normal range of motion and neck supple. No rigidity or tenderness.   Lymphadenopathy:      Cervical: No cervical adenopathy.   Skin:     Coloration: Skin is not jaundiced.      Findings: No lesion or rash.   Neurological:      General: No focal deficit present.      Mental Status: He is alert and oriented to person, place, and time.   Psychiatric:         Mood and Affect: Mood normal.         Behavior: Behavior normal.          Result Review :  The following data was reviewed by Marilynn Giordano MD     Lab Results  Lab Results   Component Value Date    WBC 2.69 (L) 07/11/2024    HGB 13.2 07/11/2024    HCT 40.9 07/11/2024    MCV 85.6 07/11/2024    PLT 44 (C) 07/11/2024     Lab Results   Component Value Date    GLUCOSE 128 (H) 07/11/2024    BUN 12 07/11/2024    CREATININE 0.92 07/11/2024    BCR 13.0 07/11/2024    K 4.2 07/11/2024    CO2 21.0 (L) 07/11/2024    CALCIUM 8.6 07/11/2024    ALBUMIN 3.9 07/11/2024    AST 47 (H) 07/11/2024    ALT 34 07/11/2024      Lab Results   Component Value Date    CRP 0.68 (H) 10/17/2024        No results found for: \"ACANTHNAEG\", \"AFBCX\", \"BPERTUSSISCX\", \"BLOODCX\"  No results found for: \"BCIDPCR\", \"CXREFLEX\", \"CSFCX\", \"CULTURETIS\"  No results found for: \"CULTURES\", \"HSVCX\", \"URCX\"  No results found for: \"EYECULTURE\", \"GCCX\", \"HSVCULTURE\", \"LABHSV\"  No results found for: \"LEGIONELLA\", \"MRSACX\", \"MUMPSCX\", \"MYCOPLASCX\"  No results found for: \"NOCARDIACX\", " "\"STOOLCX\"  No results found for: \"THROATCX\", \"UNSTIMCULT\", \"URINECX\", \"CULTURE\", \"VZVCULTUR\"  No results found for: \"VIRALCULTU\", \"WOUNDCX\"    Radiology Results              Assessment / Plan        Diagnoses and all orders for this visit:    1. Endocarditis of tricuspid valve (Primary)    2. MRSA bacteremia    3. Other cirrhosis of liver    4. Staphylococcal arthritis of right knee    5. Status post total right knee replacement      Patient is doing great with no complaints and no evidence of clinical sepsis. Will follow closely.     Continue with oral lifelong suppressive therapy with doxycycline 100 mg daily that was decreased from bid on 6/6/23.     Consider compression stockings to the right lower extremity.     CRP remains low at 0.68 as of 10/17/2024.     I discussed sugar control and diet restrictions.    Will follow up in 8 weeks.          Follow Up   No follow-ups on file.    Visit Diagnoses:    ICD-10-CM ICD-9-CM   1. Endocarditis of tricuspid valve  I07.9 424.2   2. MRSA bacteremia  R78.81 790.7    B95.62 041.12   3. Other cirrhosis of liver  K74.69 571.5   4. Staphylococcal arthritis of right knee  M00.061 711.06     041.10   5. Status post total right knee replacement  Z96.651 V43.65       Patient was given instructions and counseling regarding his condition or for health maintenance advice. Please see specific information pulled into the AVS if appropriate.     This document has been electronically signed by Marilynn Giordano MD   October 22, 2024 08:14 EDT      No orders of the defined types were placed in this encounter.     Dictated Utilizing Dragon Dictation: Part of this note may be an electronic transcription/translation of spoken language to printed text using the Dragon Dictation System.    "

## 2024-12-12 ENCOUNTER — LAB (OUTPATIENT)
Dept: LAB | Facility: HOSPITAL | Age: 59
End: 2024-12-12
Payer: MEDICAID

## 2024-12-12 DIAGNOSIS — K74.69 OTHER CIRRHOSIS OF LIVER: ICD-10-CM

## 2024-12-12 DIAGNOSIS — M00.061 STAPHYLOCOCCAL ARTHRITIS OF RIGHT KNEE: ICD-10-CM

## 2024-12-12 DIAGNOSIS — I07.9 ENDOCARDITIS OF TRICUSPID VALVE: ICD-10-CM

## 2024-12-12 DIAGNOSIS — R78.81 MRSA BACTEREMIA: ICD-10-CM

## 2024-12-12 DIAGNOSIS — Z96.651 STATUS POST TOTAL RIGHT KNEE REPLACEMENT: ICD-10-CM

## 2024-12-12 DIAGNOSIS — B95.62 MRSA BACTEREMIA: ICD-10-CM

## 2024-12-12 LAB — CRP SERPL-MCNC: 0.56 MG/DL (ref 0–0.5)

## 2024-12-12 PROCEDURE — 36415 COLL VENOUS BLD VENIPUNCTURE: CPT

## 2024-12-12 PROCEDURE — 86140 C-REACTIVE PROTEIN: CPT

## 2024-12-17 ENCOUNTER — OFFICE VISIT (OUTPATIENT)
Dept: INFECTIOUS DISEASES | Facility: CLINIC | Age: 59
End: 2024-12-17
Payer: MEDICAID

## 2024-12-17 VITALS
HEART RATE: 89 BPM | DIASTOLIC BLOOD PRESSURE: 72 MMHG | SYSTOLIC BLOOD PRESSURE: 165 MMHG | OXYGEN SATURATION: 94 % | WEIGHT: 251 LBS | BODY MASS INDEX: 36.01 KG/M2

## 2024-12-17 DIAGNOSIS — M00.061 STAPHYLOCOCCAL ARTHRITIS OF RIGHT KNEE: ICD-10-CM

## 2024-12-17 DIAGNOSIS — B95.62 MRSA BACTEREMIA: ICD-10-CM

## 2024-12-17 DIAGNOSIS — Z96.651 STATUS POST TOTAL RIGHT KNEE REPLACEMENT: ICD-10-CM

## 2024-12-17 DIAGNOSIS — R78.81 MRSA BACTEREMIA: ICD-10-CM

## 2024-12-17 DIAGNOSIS — I07.9 ENDOCARDITIS OF TRICUSPID VALVE: Primary | ICD-10-CM

## 2024-12-17 NOTE — PROGRESS NOTES
[facility]         Referring Provider: No referring provider defined for this encounter.    Subjective      Chief Complaint  Endocarditis of tricuspid valve    History of Present Illness  Melchor Hardwick III is a 59 y.o. male who presents today to [facility] for Follow Up .     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 No fever, no chills and no night sweats. I reviewed labs from last week..     Pain is about the same, no fever, no chills and no erythema, no swelling of the knee. No clinical sepsis. Platelets are running little lower than usual. No fever and no chills.     Taking doxycycline 100 mg one po daily. No adverse reactions.    Past Medical History:   Diagnosis Date    Diabetes mellitus     4 times per day gets checked for sugar at rehab    Dizzy     Hyperlipidemia     Hypertension     Hypothyroid 11/30/2022    MRSA infection     blood -   2022    Orthostatic hypotension     Pressure sore on buttocks     top crack -  sees wound - but now rn take care of it at rehab - minor opening - dime size - pat nurse didnt assess-  pt states getting better    Pyogenic arthritis of right knee joint, due to unspecified organism 09/21/2022    Sepsis     Wears glasses     readers       Past Surgical History:   Procedure Laterality Date    ABSCESS DRAINAGE  2004    PERINEUM    AMPUTATION FOOT Left 05/18/2022    Procedure: AMPUTATION FOOT;  Surgeon: Addison Colby MD;  Location: [facility] OR;  Service: Podiatry;  Laterality: Left;    INCISION AND DRAINAGE LEG Left 05/10/2022    Procedure: INCISION AND DRAINAGE LOWER EXTREMITY;  Surgeon: Addison Colby MD;  Location: [facility] OR;  Service: Podiatry;  Laterality: Left;    KNEE INCISION AND DRAINAGE Right 09/21/2022    Procedure: KNEE INCISION AND DRAINAGE RIGHT;  Surgeon: Brain Bentley MD;  Location: [facility] OR;  Service: Orthopedics;  Laterality: Right;     PERIPHERALLY INSERTED CENTRAL CATHETER INSERTION Left     TOTAL KNEE  PROSTHESIS REMOVAL W/ SPACER INSERTION Right 11/30/2022    Procedure: ANTIBIOTIC SPACER PLACEMENT KNEE - RIGHT;  Surgeon: Brain Bentley MD;  Location:  [facility];  Service: Orthopedics;  Laterality: Right;    WOUND DEBRIDEMENT Left 05/13/2022    Procedure: DEBRIDEMENT FOOT;  Surgeon: Addison Colby MD;  Location:  [facility] OR;  Service: Podiatry;  Laterality: Left;       Social History     Socioeconomic History    Marital status: Single   Tobacco Use    Smoking status: Never    Smokeless tobacco: Never   Vaping Use    Vaping status: Never Used   Substance and Sexual Activity    Alcohol use: Never    Drug use: Never    Sexual activity: Defer       Family History  family history is not on file.      There is no immunization history on file for this patient.     Allergies  No Known Allergies    The medication list has been reviewed and updated.   Current Medications    Current Outpatient Medications:     acetaminophen (TYLENOL) 325 MG tablet, Take 2 tablets by mouth Every 4 (Four) Hours As Needed for Mild Pain., Disp: , Rfl:     ascorbic acid (VITAMIN C) 500 MG tablet, Take 1 tablet by mouth Daily., Disp: , Rfl:     bisacodyl (DULCOLAX) 10 MG suppository, Insert 1 suppository into the rectum Daily As Needed for Constipation., Disp: , Rfl:     cyclobenzaprine (FLEXERIL) 5 MG tablet, Take 1 tablet by mouth 3 (Three) Times a Day As Needed for Muscle Spasms., Disp: , Rfl:     Diclofenac Sodium (VOLTAREN) 1 % gel gel, Apply 4 g topically to the appropriate area as directed 4 (Four) Times a Day As Needed., Disp: , Rfl:     docusate sodium 100 MG capsule, Take 1 capsule by mouth 2 (Two) Times a Day., Disp: , Rfl:     doxycycline (VIBRAMYICN) 100 MG tablet, Take 1 tablet by mouth Daily., Disp: , Rfl:     fludrocortisone 0.1 MG tablet, Take 0.5 tablets by mouth Daily., Disp: , Rfl:     Insulin Aspart (novoLOG) 100 UNIT/ML injection, Inject 0-14 Units  under the skin into the appropriate area as directed 3 (Three) Times a Day Before Meals., Disp: , Rfl: 12    Insulin Aspart (novoLOG) 100 UNIT/ML injection, Inject 4 Units under the skin into the appropriate area as directed 3 (Three) Times a Day Before Meals., Disp: , Rfl: 12    insulin detemir (LEVEMIR) 100 UNIT/ML injection, Inject 17 Units under the skin into the appropriate area as directed Every 12 (Twelve) Hours., Disp: , Rfl: 12    levothyroxine (SYNTHROID, LEVOTHROID) 50 MCG tablet, Take 1 tablet by mouth Daily., Disp: , Rfl:     meloxicam (MOBIC) 15 MG tablet, Take 1 tablet by mouth Daily., Disp: , Rfl:     midodrine (PROAMATINE) 2.5 MG tablet, Take 1 tablet by mouth 3 (Three) Times a Day Before Meals. Hold if SBP is above 120 (Patient not taking: Reported on 10/22/2024), Disp: , Rfl:     multivitamin with minerals tablet tablet, Take 1 tablet by mouth Daily., Disp: , Rfl:     pantoprazole (PROTONIX) 40 MG EC tablet, Take 1 tablet by mouth Every Morning., Disp: , Rfl:     polyethylene glycol (MIRALAX) 17 g packet, Take 17 g by mouth Daily., Disp: , Rfl:     pregabalin (LYRICA) 100 MG capsule, Take 1 capsule by mouth Every 12 (Twelve) Hours. (Patient taking differently: Take 1 capsule by mouth Every 12 (Twelve) Hours. Pt states he is taking 25 mg), Disp: , Rfl:     Vitamins A & D (magic barrier cream), Apply 1 application topically to the appropriate area as directed As Needed (skin irritation)., Disp: , Rfl:       Review of Systems    Review of Systems   Constitutional:  Negative for activity change, appetite change, chills, diaphoresis and fever.   HENT:  Negative for congestion, dental problem, drooling, mouth sores, sinus pressure and sneezing.    Eyes:  Negative for blurred vision, double vision, photophobia and discharge.   Respiratory:  Negative for apnea, cough, choking, chest tightness, shortness of breath and wheezing.    Cardiovascular:  Negative for chest pain, palpitations and leg swelling.  "  Gastrointestinal:  Negative for abdominal distention, abdominal pain, diarrhea, nausea and vomiting.   Genitourinary:  Negative for dysuria, flank pain and frequency.   Musculoskeletal:  Positive for arthralgias and gait problem. Negative for neck pain and neck stiffness.   Skin:  Negative for rash.   Neurological:  Negative for dizziness, tremors, seizures, syncope, speech difficulty, numbness, headache and confusion.   Psychiatric/Behavioral:  Negative for agitation, behavioral problems, hallucinations and suicidal ideas.         Objective     Vital Signs:  /72 (BP Location: Left arm, Patient Position: Sitting, Cuff Size: Small Adult)   Pulse 89   Wt 114 kg (251 lb)   SpO2 94%   BMI 36.01 kg/m²   Estimated body mass index is 36.01 kg/m² as calculated from the following:    Height as of 10/22/24: 177.8 cm (70\").    Weight as of this encounter: 114 kg (251 lb).    Physical Exam  Constitutional:       General: He is not in acute distress.     Appearance: Normal appearance. He is normal weight. He is not ill-appearing, toxic-appearing or diaphoretic.   HENT:      Head: Normocephalic and atraumatic.      Nose: Nose normal. No congestion or rhinorrhea.      Mouth/Throat:      Mouth: Mucous membranes are moist.      Pharynx: Oropharynx is clear.   Eyes:      General: No scleral icterus.     Extraocular Movements: Extraocular movements intact.      Pupils: Pupils are equal, round, and reactive to light.   Neck:      Vascular: No carotid bruit.   Cardiovascular:      Rate and Rhythm: Normal rate and regular rhythm.      Pulses: Normal pulses.      Heart sounds: No murmur heard.  Pulmonary:      Effort: Pulmonary effort is normal. No respiratory distress.      Breath sounds: Normal breath sounds. No stridor. No wheezing, rhonchi or rales.   Chest:      Chest wall: No tenderness.   Abdominal:      General: Abdomen is flat. Bowel sounds are normal. There is no distension.      Palpations: Abdomen is soft.      " "Tenderness: There is no abdominal tenderness. There is no guarding or rebound.   Musculoskeletal:      Cervical back: Normal range of motion and neck supple. No rigidity or tenderness.   Lymphadenopathy:      Cervical: No cervical adenopathy.   Skin:     Coloration: Skin is not jaundiced.      Findings: No lesion or rash.   Neurological:      General: No focal deficit present.      Mental Status: He is alert and oriented to person, place, and time.   Psychiatric:         Mood and Affect: Mood normal.         Behavior: Behavior normal.          Result Review :  The following data was reviewed by Marilynn Giordano MD     Lab Results  Lab Results   Component Value Date    WBC 2.69 (L) 07/11/2024    HGB 13.2 07/11/2024    HCT 40.9 07/11/2024    MCV 85.6 07/11/2024    PLT 44 (C) 07/11/2024     Lab Results   Component Value Date    GLUCOSE 128 (H) 07/11/2024    BUN 12 07/11/2024    CREATININE 0.92 07/11/2024    BCR 13.0 07/11/2024    K 4.2 07/11/2024    CO2 21.0 (L) 07/11/2024    CALCIUM 8.6 07/11/2024    ALBUMIN 3.9 07/11/2024    AST 47 (H) 07/11/2024    ALT 34 07/11/2024      Lab Results   Component Value Date    CRP 0.56 (H) 12/12/2024        No results found for: \"ACANTHNAEG\", \"AFBCX\", \"BPERTUSSISCX\", \"BLOODCX\"  No results found for: \"BCIDPCR\", \"CXREFLEX\", \"CSFCX\", \"CULTURETIS\"  No results found for: \"CULTURES\", \"HSVCX\", \"URCX\"  No results found for: \"EYECULTURE\", \"GCCX\", \"HSVCULTURE\", \"LABHSV\"  No results found for: \"LEGIONELLA\", \"MRSACX\", \"MUMPSCX\", \"MYCOPLASCX\"  No results found for: \"NOCARDIACX\", \"STOOLCX\"  No results found for: \"THROATCX\", \"UNSTIMCULT\", \"URINECX\", \"CULTURE\", \"VZVCULTUR\"  No results found for: \"VIRALCULTU\", \"WOUNDCX\"    Radiology Results              Assessment / Plan        Diagnoses and all orders for this visit:    1. Endocarditis of tricuspid valve (Primary)  -     C-reactive Protein; Future    2. MRSA bacteremia  -     C-reactive Protein; Future    3. Status post total right knee " replacement  -     C-reactive Protein; Future    4. Staphylococcal arthritis of right knee  -     C-reactive Protein; Future      Patient is doing great with no complaints and no evidence of clinical sepsis. Will follow closely.     Continue with oral lifelong suppressive therapy with doxycycline 100 mg daily that was decreased from bid on 6/6/23.     Consider compression stockings to the right lower extremity.     Labs as of 12/16/2024 with normal creatinine at 1.03 and elevated liver enzymes at 66 and 59 with hemoglobin A1c at 10.1% and a CRP level on 12/12/2024 at 0.56.  As far as liver enzymes they have been fluctuating in the past and they are managed by PCP.     I discussed sugar control and diet restrictions.     Will follow up in 8 weeks with labs to be drawn prior to apt on 2/7/24.    Will plan for a 2D echo and possibly TYRESE next visit.          Follow Up   Return in about 8 weeks (around 2/11/2025) for Follow up, With Dr. Giordano.    Visit Diagnoses:    ICD-10-CM ICD-9-CM   1. Endocarditis of tricuspid valve  I07.9 424.2   2. MRSA bacteremia  R78.81 790.7    B95.62 041.12   3. Status post total right knee replacement  Z96.651 V43.65   4. Staphylococcal arthritis of right knee  M00.061 711.06     041.10       Patient was given instructions and counseling regarding his condition or for health maintenance advice. Please see specific information pulled into the AVS if appropriate.     This document has been electronically signed by Marilynn Giordano MD   December 17, 2024 08:41 EST      No orders of the defined types were placed in this encounter.     Dictated Utilizing Dragon Dictation: Part of this note may be an electronic transcription/translation of spoken language to printed text using the Dragon Dictation System.

## 2025-02-06 ENCOUNTER — LAB (OUTPATIENT)
Dept: LAB | Facility: HOSPITAL | Age: 60
End: 2025-02-06
Payer: MEDICAID

## 2025-02-06 DIAGNOSIS — Z96.651 STATUS POST TOTAL RIGHT KNEE REPLACEMENT: ICD-10-CM

## 2025-02-06 DIAGNOSIS — R78.81 MRSA BACTEREMIA: ICD-10-CM

## 2025-02-06 DIAGNOSIS — I07.9 ENDOCARDITIS OF TRICUSPID VALVE: ICD-10-CM

## 2025-02-06 DIAGNOSIS — B95.62 MRSA BACTEREMIA: ICD-10-CM

## 2025-02-06 DIAGNOSIS — M00.061 STAPHYLOCOCCAL ARTHRITIS OF RIGHT KNEE: ICD-10-CM

## 2025-02-06 LAB — CRP SERPL-MCNC: 0.66 MG/DL (ref 0–0.5)

## 2025-02-06 PROCEDURE — 36415 COLL VENOUS BLD VENIPUNCTURE: CPT

## 2025-02-06 PROCEDURE — 86140 C-REACTIVE PROTEIN: CPT

## 2025-02-11 ENCOUNTER — OFFICE VISIT (OUTPATIENT)
Dept: INFECTIOUS DISEASES | Facility: CLINIC | Age: 60
End: 2025-02-11
Payer: MEDICAID

## 2025-02-11 VITALS
BODY MASS INDEX: 36.05 KG/M2 | SYSTOLIC BLOOD PRESSURE: 189 MMHG | RESPIRATION RATE: 18 BRPM | HEART RATE: 91 BPM | OXYGEN SATURATION: 97 % | WEIGHT: 251.8 LBS | HEIGHT: 70 IN | DIASTOLIC BLOOD PRESSURE: 89 MMHG

## 2025-02-11 DIAGNOSIS — Z96.651 STATUS POST TOTAL RIGHT KNEE REPLACEMENT: ICD-10-CM

## 2025-02-11 DIAGNOSIS — B95.62 MRSA BACTEREMIA: ICD-10-CM

## 2025-02-11 DIAGNOSIS — I07.9 ENDOCARDITIS OF TRICUSPID VALVE: Primary | ICD-10-CM

## 2025-02-11 DIAGNOSIS — R78.81 MRSA BACTEREMIA: ICD-10-CM

## 2025-02-11 DIAGNOSIS — K74.69 OTHER CIRRHOSIS OF LIVER: ICD-10-CM

## 2025-02-11 DIAGNOSIS — M00.061 STAPHYLOCOCCAL ARTHRITIS OF RIGHT KNEE: ICD-10-CM

## 2025-02-11 NOTE — PROGRESS NOTES
[facility]         Referring Provider: No referring provider defined for this encounter.    Subjective      Chief Complaint  No chief complaint on file.    History of Present Illness  Melchor Hardwick III is a 59 y.o. male who presents today to [facility] for infectious disease evaluation and antibiotic management.    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 No fever, no chills and no night sweats. I reviewed labs from last week..     Pain is about the same, no fever, no chills and no erythema, no swelling of the knee. No clinical sepsis. Platelets are running little lower than usual. No fever and no chills.     Taking doxycycline 100 mg one po daily. No adverse reactions. Occasional knee pain with weather changes. No fever, no chills and no night sweats.      Past Medical History:   Diagnosis Date    Diabetes mellitus     4 times per day gets checked for sugar at rehab    Dizzy     Hyperlipidemia     Hypertension     Hypothyroid 11/30/2022    MRSA infection     blood -   2022    Orthostatic hypotension     Pressure sore on buttocks     top crack -  sees wound - but now rn take care of it at rehab - minor opening - dime size - pat nurse didnt assess-  pt states getting better    Pyogenic arthritis of right knee joint, due to unspecified organism 09/21/2022    Sepsis     Wears glasses     readers       Past Surgical History:   Procedure Laterality Date    ABSCESS DRAINAGE  2004    PERINEUM    AMPUTATION FOOT Left 05/18/2022    Procedure: AMPUTATION FOOT;  Surgeon: Addison Colby MD;  Location: [facility];  Service: Podiatry;  Laterality: Left;    INCISION AND DRAINAGE LEG Left 05/10/2022    Procedure: INCISION AND DRAINAGE LOWER EXTREMITY;  Surgeon: Addison Colby MD;  Location: [facility] OR;  Service: Podiatry;  Laterality: Left;    KNEE INCISION AND DRAINAGE Right 09/21/2022    Procedure: KNEE  INCISION AND DRAINAGE RIGHT;  Surgeon: Brain Bentley MD;  Location:  [facility] OR;  Service: Orthopedics;  Laterality: Right;    PERIPHERALLY INSERTED CENTRAL CATHETER INSERTION Left     TOTAL KNEE  PROSTHESIS REMOVAL W/ SPACER INSERTION Right 11/30/2022    Procedure: ANTIBIOTIC SPACER PLACEMENT KNEE - RIGHT;  Surgeon: Brain Bentley MD;  Location:  [facility] OR;  Service: Orthopedics;  Laterality: Right;    WOUND DEBRIDEMENT Left 05/13/2022    Procedure: DEBRIDEMENT FOOT;  Surgeon: Addison Colby MD;  Location:  [facility] OR;  Service: Podiatry;  Laterality: Left;       Social History     Socioeconomic History    Marital status: Single   Tobacco Use    Smoking status: Never    Smokeless tobacco: Never   Vaping Use    Vaping status: Never Used   Substance and Sexual Activity    Alcohol use: Never    Drug use: Never    Sexual activity: Defer       Family History  family history is not on file.      There is no immunization history on file for this patient.     Allergies  No Known Allergies    The medication list has been reviewed and updated.   Current Medications    Current Outpatient Medications:     acetaminophen (TYLENOL) 325 MG tablet, Take 2 tablets by mouth Every 4 (Four) Hours As Needed for Mild Pain., Disp: , Rfl:     ascorbic acid (VITAMIN C) 500 MG tablet, Take 1 tablet by mouth Daily., Disp: , Rfl:     bisacodyl (DULCOLAX) 10 MG suppository, Insert 1 suppository into the rectum Daily As Needed for Constipation., Disp: , Rfl:     cyclobenzaprine (FLEXERIL) 5 MG tablet, Take 1 tablet by mouth 3 (Three) Times a Day As Needed for Muscle Spasms., Disp: , Rfl:     Diclofenac Sodium (VOLTAREN) 1 % gel gel, Apply 4 g topically to the appropriate area as directed 4 (Four) Times a Day As Needed., Disp: , Rfl:     docusate sodium 100 MG capsule, Take 1 capsule by mouth 2 (Two) Times a Day., Disp: , Rfl:     doxycycline (VIBRAMYICN) 100 MG tablet, Take 1 tablet by mouth Daily., Disp: , Rfl:     fludrocortisone 0.1 MG  tablet, Take 0.5 tablets by mouth Daily., Disp: , Rfl:     Insulin Aspart (novoLOG) 100 UNIT/ML injection, Inject 0-14 Units under the skin into the appropriate area as directed 3 (Three) Times a Day Before Meals., Disp: , Rfl: 12    Insulin Aspart (novoLOG) 100 UNIT/ML injection, Inject 4 Units under the skin into the appropriate area as directed 3 (Three) Times a Day Before Meals., Disp: , Rfl: 12    insulin detemir (LEVEMIR) 100 UNIT/ML injection, Inject 17 Units under the skin into the appropriate area as directed Every 12 (Twelve) Hours., Disp: , Rfl: 12    levothyroxine (SYNTHROID, LEVOTHROID) 50 MCG tablet, Take 1 tablet by mouth Daily., Disp: , Rfl:     meloxicam (MOBIC) 15 MG tablet, Take 1 tablet by mouth Daily., Disp: , Rfl:     midodrine (PROAMATINE) 2.5 MG tablet, Take 1 tablet by mouth 3 (Three) Times a Day Before Meals. Hold if SBP is above 120 (Patient not taking: Reported on 10/22/2024), Disp: , Rfl:     multivitamin with minerals tablet tablet, Take 1 tablet by mouth Daily., Disp: , Rfl:     pantoprazole (PROTONIX) 40 MG EC tablet, Take 1 tablet by mouth Every Morning., Disp: , Rfl:     polyethylene glycol (MIRALAX) 17 g packet, Take 17 g by mouth Daily., Disp: , Rfl:     pregabalin (LYRICA) 100 MG capsule, Take 1 capsule by mouth Every 12 (Twelve) Hours. (Patient taking differently: Take 1 capsule by mouth Every 12 (Twelve) Hours. Pt states he is taking 25 mg), Disp: , Rfl:     Vitamins A & D (magic barrier cream), Apply 1 application topically to the appropriate area as directed As Needed (skin irritation)., Disp: , Rfl:       Review of Systems    Review of Systems   Constitutional:  Negative for activity change, appetite change, chills, diaphoresis and fever.   HENT:  Negative for congestion, dental problem, drooling, mouth sores, sinus pressure and sneezing.    Eyes:  Negative for blurred vision, double vision, photophobia and discharge.   Respiratory:  Negative for apnea, cough, choking, chest  "tightness, shortness of breath and wheezing.    Cardiovascular:  Negative for chest pain, palpitations and leg swelling.   Gastrointestinal:  Negative for abdominal distention, abdominal pain, diarrhea, nausea and vomiting.   Genitourinary:  Negative for dysuria, flank pain and frequency.   Musculoskeletal:  Positive for arthralgias, back pain, gait problem and joint swelling. Negative for neck pain and neck stiffness.   Skin:  Negative for rash.   Neurological:  Negative for dizziness, tremors, seizures, syncope, speech difficulty, numbness, headache and confusion.   Psychiatric/Behavioral:  Negative for agitation, behavioral problems, hallucinations and suicidal ideas.         Objective     Vital Signs:  There were no vitals taken for this visit.  Estimated body mass index is 36.01 kg/m² as calculated from the following:    Height as of 10/22/24: 177.8 cm (70\").    Weight as of 12/17/24: 114 kg (251 lb).    Physical Exam  Constitutional:       General: He is not in acute distress.     Appearance: Normal appearance. He is normal weight. He is not ill-appearing, toxic-appearing or diaphoretic.   HENT:      Head: Normocephalic and atraumatic.      Nose: Nose normal. No congestion or rhinorrhea.      Mouth/Throat:      Mouth: Mucous membranes are moist.      Pharynx: Oropharynx is clear.   Eyes:      General: No scleral icterus.     Extraocular Movements: Extraocular movements intact.      Pupils: Pupils are equal, round, and reactive to light.   Neck:      Vascular: No carotid bruit.   Cardiovascular:      Rate and Rhythm: Normal rate and regular rhythm.      Pulses: Normal pulses.      Heart sounds: No murmur heard.  Pulmonary:      Effort: Pulmonary effort is normal. No respiratory distress.      Breath sounds: Normal breath sounds. No stridor. No wheezing, rhonchi or rales.   Chest:      Chest wall: No tenderness.   Abdominal:      General: Abdomen is flat. Bowel sounds are normal. There is no distension.      " "Palpations: Abdomen is soft.      Tenderness: There is no abdominal tenderness. There is no guarding or rebound.   Musculoskeletal:      Cervical back: Normal range of motion and neck supple. No rigidity or tenderness.   Lymphadenopathy:      Cervical: No cervical adenopathy.   Skin:     Coloration: Skin is not jaundiced.      Findings: No lesion or rash.      Comments: Both knees look great with no erythema.   Neurological:      General: No focal deficit present.      Mental Status: He is alert and oriented to person, place, and time.   Psychiatric:         Mood and Affect: Mood normal.         Behavior: Behavior normal.          Result Review :  The following data was reviewed by Marilynn Giordano MD     Lab Results  Lab Results   Component Value Date    WBC 2.69 (L) 07/11/2024    HGB 13.2 07/11/2024    HCT 40.9 07/11/2024    MCV 85.6 07/11/2024    PLT 44 (C) 07/11/2024     Lab Results   Component Value Date    GLUCOSE 128 (H) 07/11/2024    BUN 12 07/11/2024    CREATININE 0.92 07/11/2024    BCR 13.0 07/11/2024    K 4.2 07/11/2024    CO2 21.0 (L) 07/11/2024    CALCIUM 8.6 07/11/2024    ALBUMIN 3.9 07/11/2024    AST 47 (H) 07/11/2024    ALT 34 07/11/2024      Lab Results   Component Value Date    CRP 0.66 (H) 02/06/2025        No results found for: \"ACANTHNAEG\", \"AFBCX\", \"BPERTUSSISCX\", \"BLOODCX\"  No results found for: \"BCIDPCR\", \"CXREFLEX\", \"CSFCX\", \"CULTURETIS\"  No results found for: \"CULTURES\", \"HSVCX\", \"URCX\"  No results found for: \"EYECULTURE\", \"GCCX\", \"HSVCULTURE\", \"LABHSV\"  No results found for: \"LEGIONELLA\", \"MRSACX\", \"MUMPSCX\", \"MYCOPLASCX\"  No results found for: \"NOCARDIACX\", \"STOOLCX\"  No results found for: \"THROATCX\", \"UNSTIMCULT\", \"URINECX\", \"CULTURE\", \"VZVCULTUR\"  No results found for: \"VIRALCULTU\", \"WOUNDCX\"    Radiology Results              Assessment / Plan        Diagnoses and all orders for this visit:    1. Endocarditis of tricuspid valve (Primary)  -     Adult Transthoracic Echo Complete W/ " Cont if Necessary Per Protocol; Future  -     C-reactive Protein; Future    2. MRSA bacteremia  -     Adult Transthoracic Echo Complete W/ Cont if Necessary Per Protocol; Future  -     C-reactive Protein; Future    3. Status post total right knee replacement  -     Adult Transthoracic Echo Complete W/ Cont if Necessary Per Protocol; Future  -     C-reactive Protein; Future    4. Staphylococcal arthritis of right knee  -     Adult Transthoracic Echo Complete W/ Cont if Necessary Per Protocol; Future  -     C-reactive Protein; Future    5. Other cirrhosis of liver  -     Adult Transthoracic Echo Complete W/ Cont if Necessary Per Protocol; Future  -     C-reactive Protein; Future      Patient is doing great with no complaints and no evidence of clinical sepsis. Will follow closely.     Continue with oral lifelong suppressive therapy with doxycycline 100 mg daily that was decreased from bid on 6/6/23.     Consider compression stockings to the right lower extremity.     Labs as of 2/6/25 with stable CRP level at 0.66.     I discussed sugar control and diet restrictions.     Will follow up in 8 weeks with labs to be drawn on 4/4/25 prior to apt on 4/8/25     Will proceed with 2D Echo to follow up on valvular vegetation.          Follow Up   No follow-ups on file.    Visit Diagnoses:    ICD-10-CM ICD-9-CM   1. Endocarditis of tricuspid valve  I07.9 424.2   2. MRSA bacteremia  R78.81 790.7    B95.62 041.12   3. Status post total right knee replacement  Z96.651 V43.65   4. Staphylococcal arthritis of right knee  M00.061 711.06     041.10   5. Other cirrhosis of liver  K74.69 571.5       Patient was given instructions and counseling regarding his condition or for health maintenance advice. Please see specific information pulled into the AVS if appropriate.     This document has been electronically signed by Marilynn Giordano MD   February 11, 2025 08:51 EST      No orders of the defined types were placed in this  encounter.     Dictated Utilizing Dragon Dictation: Part of this note may be an electronic transcription/translation of spoken language to printed text using the Dragon Dictation System.

## 2025-02-25 ENCOUNTER — HOSPITAL ENCOUNTER (OUTPATIENT)
Facility: HOSPITAL | Age: 60
Discharge: HOME | End: 2025-02-25
Admitting: INTERNAL MEDICINE
Payer: MEDICAID

## 2025-02-25 DIAGNOSIS — R78.81 MRSA BACTEREMIA: ICD-10-CM

## 2025-02-25 DIAGNOSIS — Z96.651 STATUS POST TOTAL RIGHT KNEE REPLACEMENT: ICD-10-CM

## 2025-02-25 DIAGNOSIS — I07.9 ENDOCARDITIS OF TRICUSPID VALVE: ICD-10-CM

## 2025-02-25 DIAGNOSIS — B95.62 MRSA BACTEREMIA: ICD-10-CM

## 2025-02-25 DIAGNOSIS — M00.061 STAPHYLOCOCCAL ARTHRITIS OF RIGHT KNEE: ICD-10-CM

## 2025-02-25 DIAGNOSIS — K74.69 OTHER CIRRHOSIS OF LIVER: ICD-10-CM

## 2025-02-25 PROCEDURE — 93306 TTE W/DOPPLER COMPLETE: CPT

## 2025-02-26 LAB
AV MEAN PRESS GRAD SYS DOP V1V2: 1.9 MMHG
AV VMAX SYS DOP: 95 CM/SEC
BH CV ECHO MEAS - ACS: 2.7 CM
BH CV ECHO MEAS - AO MAX PG: 3.6 MMHG
BH CV ECHO MEAS - AO ROOT DIAM: 3.5 CM
BH CV ECHO MEAS - AO V2 VTI: 21.1 CM
BH CV ECHO MEAS - EDV(MOD-SP4): 124.2 ML
BH CV ECHO MEAS - EF(MOD-SP4): 53.9 %
BH CV ECHO MEAS - ESV(MOD-SP4): 57.2 ML
BH CV ECHO MEAS - IVS/LVPW: 1.26 CM
BH CV ECHO MEAS - IVSD: 1.1 CM
BH CV ECHO MEAS - LA DIMENSION: 4.4 CM
BH CV ECHO MEAS - LAT PEAK E' VEL: 8.8 CM/SEC
BH CV ECHO MEAS - LV DIASTOLIC VOL/BSA (35-75): 54 CM2
BH CV ECHO MEAS - LV SYSTOLIC VOL/BSA (12-30): 24.9 CM2
BH CV ECHO MEAS - LVIDD: 5.3 CM
BH CV ECHO MEAS - LVIDS: 4 CM
BH CV ECHO MEAS - LVOT DIAM: 2.17 CM
BH CV ECHO MEAS - LVPWD: 0.87 CM
BH CV ECHO MEAS - MED PEAK E' VEL: 6.8 CM/SEC
BH CV ECHO MEAS - PA ACC TIME: 0.06 SEC
BH CV ECHO MEAS - SV(MOD-SP4): 67 ML
BH CV ECHO MEAS - SVI(MOD-SP4): 29.1 ML/M2
BH CV ECHO MEAS - TAPSE (>1.6): 2.7 CM
LEFT ATRIUM VOLUME INDEX: 14.2 ML/M2

## 2025-04-02 ENCOUNTER — LAB (OUTPATIENT)
Dept: LAB | Facility: HOSPITAL | Age: 60
End: 2025-04-02
Payer: MEDICAID

## 2025-04-02 LAB — CRP SERPL-MCNC: 0.7 MG/DL (ref 0–0.5)

## 2025-04-02 PROCEDURE — 86140 C-REACTIVE PROTEIN: CPT | Performed by: INTERNAL MEDICINE

## 2025-04-08 ENCOUNTER — OFFICE VISIT (OUTPATIENT)
Dept: INFECTIOUS DISEASES | Facility: CLINIC | Age: 60
End: 2025-04-08
Payer: MEDICAID

## 2025-04-08 VITALS
BODY MASS INDEX: 34.65 KG/M2 | RESPIRATION RATE: 18 BRPM | WEIGHT: 242 LBS | DIASTOLIC BLOOD PRESSURE: 79 MMHG | HEART RATE: 90 BPM | SYSTOLIC BLOOD PRESSURE: 163 MMHG | OXYGEN SATURATION: 97 % | HEIGHT: 70 IN

## 2025-04-08 DIAGNOSIS — R78.81 MRSA BACTEREMIA: ICD-10-CM

## 2025-04-08 DIAGNOSIS — Z96.651 STATUS POST TOTAL RIGHT KNEE REPLACEMENT: ICD-10-CM

## 2025-04-08 DIAGNOSIS — B95.62 MRSA BACTEREMIA: ICD-10-CM

## 2025-04-08 DIAGNOSIS — I07.9 ENDOCARDITIS OF TRICUSPID VALVE: Primary | ICD-10-CM

## 2025-04-08 DIAGNOSIS — M00.061 STAPHYLOCOCCAL ARTHRITIS OF RIGHT KNEE: ICD-10-CM

## 2025-04-08 DIAGNOSIS — K74.69 OTHER CIRRHOSIS OF LIVER: ICD-10-CM

## 2025-04-08 NOTE — PROGRESS NOTES
[facility]         Referring Provider: No referring provider defined for this encounter.    Subjective      Chief Complaint  Follow-up (bacteremia)    History of Present Illness  Melchor Hardwick III is a 60 y.o. male who presents today to [facility] for infectious disease evaluation and antibiotic management.    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 No fever, no chills and no night sweats. I reviewed labs from last week..     Pain is about the same, no fever, no chills and no erythema, no swelling of the knee. No clinical sepsis. Platelets are running little lower than usual. No fever and no chills.     Taking doxycycline 100 mg one po daily. No adverse reactions. Occasional knee pain with weather changes. No fever, no chills and no night sweats.    Recent 2D echo did not show vegetation but TV was not well visualized.         Past Medical History:   Diagnosis Date    Diabetes mellitus     4 times per day gets checked for sugar at rehab    Dizzy     Hyperlipidemia     Hypertension     Hypothyroid 11/30/2022    MRSA infection     blood -   2022    Orthostatic hypotension     Pressure sore on buttocks     top crack -  sees wound - but now rn take care of it at rehab - minor opening - dime size - pat nurse didnt assess-  pt states getting better    Pyogenic arthritis of right knee joint, due to unspecified organism 09/21/2022    Sepsis     Wears glasses     readers       Past Surgical History:   Procedure Laterality Date    ABSCESS DRAINAGE  2004    PERINEUM    AMPUTATION FOOT Left 05/18/2022    Procedure: AMPUTATION FOOT;  Surgeon: Addison Colby MD;  Location: [facility];  Service: Podiatry;  Laterality: Left;    INCISION AND DRAINAGE LEG Left 05/10/2022    Procedure: INCISION AND DRAINAGE LOWER EXTREMITY;  Surgeon: Addison Colby MD;  Location: [facility];  Service: Podiatry;  Laterality:  Left;    KNEE INCISION AND DRAINAGE Right 09/21/2022    Procedure: KNEE INCISION AND DRAINAGE RIGHT;  Surgeon: Brain Bentley MD;  Location:  [facility];  Service: Orthopedics;  Laterality: Right;    PERIPHERALLY INSERTED CENTRAL CATHETER INSERTION Left     TOTAL KNEE  PROSTHESIS REMOVAL W/ SPACER INSERTION Right 11/30/2022    Procedure: ANTIBIOTIC SPACER PLACEMENT KNEE - RIGHT;  Surgeon: Brain Bentley MD;  Location:  [facility];  Service: Orthopedics;  Laterality: Right;    WOUND DEBRIDEMENT Left 05/13/2022    Procedure: DEBRIDEMENT FOOT;  Surgeon: Addison Colby MD;  Location:  [facility];  Service: Podiatry;  Laterality: Left;       Social History     Socioeconomic History    Marital status: Single   Tobacco Use    Smoking status: Never    Smokeless tobacco: Never   Vaping Use    Vaping status: Never Used   Substance and Sexual Activity    Alcohol use: Never    Drug use: Never    Sexual activity: Defer       Family History  family history is not on file.      There is no immunization history on file for this patient.     Allergies  No Known Allergies    The medication list has been reviewed and updated.   Current Medications    Current Outpatient Medications:     acetaminophen (TYLENOL) 325 MG tablet, Take 2 tablets by mouth Every 4 (Four) Hours As Needed for Mild Pain., Disp: , Rfl:     ascorbic acid (VITAMIN C) 500 MG tablet, Take 1 tablet by mouth Daily., Disp: , Rfl:     bisacodyl (DULCOLAX) 10 MG suppository, Insert 1 suppository into the rectum Daily As Needed for Constipation., Disp: , Rfl:     cyclobenzaprine (FLEXERIL) 5 MG tablet, Take 1 tablet by mouth 3 (Three) Times a Day As Needed for Muscle Spasms., Disp: , Rfl:     Diclofenac Sodium (VOLTAREN) 1 % gel gel, Apply 4 g topically to the appropriate area as directed 4 (Four) Times a Day As Needed., Disp: , Rfl:     docusate sodium 100 MG capsule, Take 1 capsule by mouth 2 (Two) Times a Day., Disp: , Rfl:     doxycycline (VIBRAMYICN) 100 MG tablet,  Take 1 tablet by mouth Daily., Disp: , Rfl:     fludrocortisone 0.1 MG tablet, Take 0.5 tablets by mouth Daily., Disp: , Rfl:     Insulin Aspart (novoLOG) 100 UNIT/ML injection, Inject 0-14 Units under the skin into the appropriate area as directed 3 (Three) Times a Day Before Meals., Disp: , Rfl: 12    Insulin Aspart (novoLOG) 100 UNIT/ML injection, Inject 4 Units under the skin into the appropriate area as directed 3 (Three) Times a Day Before Meals., Disp: , Rfl: 12    insulin detemir (LEVEMIR) 100 UNIT/ML injection, Inject 17 Units under the skin into the appropriate area as directed Every 12 (Twelve) Hours., Disp: , Rfl: 12    levothyroxine (SYNTHROID, LEVOTHROID) 50 MCG tablet, Take 1 tablet by mouth Daily., Disp: , Rfl:     meloxicam (MOBIC) 15 MG tablet, Take 1 tablet by mouth Daily., Disp: , Rfl:     midodrine (PROAMATINE) 2.5 MG tablet, Take 1 tablet by mouth 3 (Three) Times a Day Before Meals. Hold if SBP is above 120, Disp: , Rfl:     multivitamin with minerals tablet tablet, Take 1 tablet by mouth Daily., Disp: , Rfl:     pantoprazole (PROTONIX) 40 MG EC tablet, Take 1 tablet by mouth Every Morning., Disp: , Rfl:     polyethylene glycol (MIRALAX) 17 g packet, Take 17 g by mouth Daily., Disp: , Rfl:     pregabalin (LYRICA) 100 MG capsule, Take 1 capsule by mouth Every 12 (Twelve) Hours. (Patient taking differently: Take 1 capsule by mouth Every 12 (Twelve) Hours. Pt states he is taking 25 mg), Disp: , Rfl:     Vitamins A & D (magic barrier cream), Apply 1 application topically to the appropriate area as directed As Needed (skin irritation)., Disp: , Rfl:       Review of Systems    Review of Systems   Constitutional:  Negative for activity change, appetite change, chills, diaphoresis and fever.   HENT:  Negative for congestion, dental problem, drooling, mouth sores, sinus pressure and sneezing.    Eyes:  Negative for blurred vision, double vision, photophobia and discharge.   Respiratory:  Negative for  "apnea, cough, choking, chest tightness, shortness of breath and wheezing.    Cardiovascular:  Negative for chest pain, palpitations and leg swelling.   Gastrointestinal:  Negative for abdominal distention, abdominal pain, diarrhea, nausea and vomiting.   Genitourinary:  Negative for dysuria, flank pain and frequency.   Musculoskeletal:  Positive for arthralgias and gait problem. Negative for neck pain and neck stiffness.   Skin:  Negative for rash.   Neurological:  Negative for dizziness, tremors, seizures, syncope, speech difficulty, numbness, headache and confusion.   Psychiatric/Behavioral:  Negative for agitation, behavioral problems, hallucinations and suicidal ideas.         Objective     Vital Signs:  /79 (BP Location: Right arm, Patient Position: Sitting, Cuff Size: Adult)   Pulse 90   Resp 18   Ht 177.8 cm (70\")   Wt 110 kg (242 lb)   SpO2 97%   BMI 34.72 kg/m²   Estimated body mass index is 34.72 kg/m² as calculated from the following:    Height as of this encounter: 177.8 cm (70\").    Weight as of this encounter: 110 kg (242 lb).    Physical Exam  Constitutional:       General: He is not in acute distress.     Appearance: Normal appearance. He is normal weight. He is not ill-appearing, toxic-appearing or diaphoretic.   HENT:      Head: Normocephalic and atraumatic.      Nose: Nose normal. No congestion or rhinorrhea.      Mouth/Throat:      Mouth: Mucous membranes are moist.      Pharynx: Oropharynx is clear.   Eyes:      General: No scleral icterus.     Extraocular Movements: Extraocular movements intact.      Pupils: Pupils are equal, round, and reactive to light.   Neck:      Vascular: No carotid bruit.   Cardiovascular:      Rate and Rhythm: Normal rate and regular rhythm.      Pulses: Normal pulses.      Heart sounds: No murmur heard.  Pulmonary:      Effort: Pulmonary effort is normal. No respiratory distress.      Breath sounds: Normal breath sounds. No stridor. No wheezing, rhonchi or " "rales.   Chest:      Chest wall: No tenderness.   Abdominal:      General: Abdomen is flat. Bowel sounds are normal. There is no distension.      Palpations: Abdomen is soft.      Tenderness: There is no abdominal tenderness. There is no guarding or rebound.   Musculoskeletal:      Cervical back: Normal range of motion and neck supple. No rigidity or tenderness.   Lymphadenopathy:      Cervical: No cervical adenopathy.   Skin:     Coloration: Skin is not jaundiced.      Findings: No lesion or rash.   Neurological:      General: No focal deficit present.      Mental Status: He is alert and oriented to person, place, and time.   Psychiatric:         Mood and Affect: Mood normal.         Behavior: Behavior normal.          Result Review :  The following data was reviewed by Marilynn Giordano MD     Lab Results  Lab Results   Component Value Date    WBC 2.69 (L) 07/11/2024    HGB 13.2 07/11/2024    HCT 40.9 07/11/2024    MCV 85.6 07/11/2024    PLT 44 (C) 07/11/2024     Lab Results   Component Value Date    GLUCOSE 128 (H) 07/11/2024    BUN 12 07/11/2024    CREATININE 0.92 07/11/2024    BCR 13.0 07/11/2024    K 4.2 07/11/2024    CO2 21.0 (L) 07/11/2024    CALCIUM 8.6 07/11/2024    ALBUMIN 3.9 07/11/2024    AST 47 (H) 07/11/2024    ALT 34 07/11/2024      Lab Results   Component Value Date    CRP 0.70 (H) 04/02/2025        No results found for: \"ACANTHNAEG\", \"AFBCX\", \"BPERTUSSISCX\", \"BLOODCX\"  No results found for: \"BCIDPCR\", \"CXREFLEX\", \"CSFCX\", \"CULTURETIS\"  No results found for: \"CULTURES\", \"HSVCX\", \"URCX\"  No results found for: \"EYECULTURE\", \"GCCX\", \"HSVCULTURE\", \"LABHSV\"  No results found for: \"LEGIONELLA\", \"MRSACX\", \"MUMPSCX\", \"MYCOPLASCX\"  No results found for: \"NOCARDIACX\", \"STOOLCX\"  No results found for: \"THROATCX\", \"UNSTIMCULT\", \"URINECX\", \"CULTURE\", \"VZVCULTUR\"  No results found for: \"VIRALCULTU\", \"WOUNDCX\"    Radiology Results              Assessment / Plan        Diagnoses and all orders for this " visit:    1. Endocarditis of tricuspid valve (Primary)  -     C-reactive Protein; Future    2. MRSA bacteremia  -     C-reactive Protein; Future    3. Other cirrhosis of liver  -     C-reactive Protein; Future    4. Status post total right knee replacement  -     C-reactive Protein; Future    5. Staphylococcal arthritis of right knee  -     C-reactive Protein; Future      Patient is doing great with no complaints and no evidence of clinical sepsis. Will follow closely.     Continue with oral lifelong suppressive therapy with doxycycline 100 mg daily that was decreased from bid on 6/6/23.     Consider compression stockings to the right lower extremity.     CRP very stable at 0.7 as of 4/2/2025.     I discussed sugar control and diet restrictions.     Will follow up in 8 weeks with labs to be drawn on 5/30/25 prior to apt on 6/3/25     2/26/25 TTE       The study is technically difficult for diagnosis especially with regards to tricuspid valve.    Normal left ventricular cavity size and wall thickness noted. All left ventricular wall segments contract normally.    Left ventricular ejection fraction appears to be 61 - 65%.    The aortic valve is not well visualized. The aortic valve is grossly normal in structure. The aortic valve appears trileaflet. Trace to mild aortic valve regurgitation is present.    The mitral valve is structurally normal with no significant stenosis present. Trace to mild mitral valve regurgitation is present.    The pulmonic valve is not well visualized.    Tricuspid valve not well visualized.    There is no evidence of pericardial effusion.    May consider a transesophageal echocardiographic study to have a better assessment of the tricuspid valve and pulmonic valve if clinically warranted.                      Follow Up   Return in about 8 weeks (around 6/3/2025) for Follow up, With Dr. Giordano.    Visit Diagnoses:    ICD-10-CM ICD-9-CM   1. Endocarditis of tricuspid valve  I07.9 424.2   2.  MRSA bacteremia  R78.81 790.7    B95.62 041.12   3. Other cirrhosis of liver  K74.69 571.5   4. Status post total right knee replacement  Z96.651 V43.65   5. Staphylococcal arthritis of right knee  M00.061 711.06     041.10       Patient was given instructions and counseling regarding his condition or for health maintenance advice. Please see specific information pulled into the AVS if appropriate.     This document has been electronically signed by Marilynn Giordano MD   April 8, 2025 09:01 EDT      No orders of the defined types were placed in this encounter.     Dictated Utilizing Dragon Dictation: Part of this note may be an electronic transcription/translation of spoken language to printed text using the Dragon Dictation System.

## 2025-05-29 ENCOUNTER — LAB (OUTPATIENT)
Dept: LAB | Facility: HOSPITAL | Age: 60
End: 2025-05-29
Payer: MEDICAID

## 2025-05-29 LAB — CRP SERPL-MCNC: 0.46 MG/DL (ref 0–0.5)

## 2025-05-29 PROCEDURE — 86140 C-REACTIVE PROTEIN: CPT | Performed by: INTERNAL MEDICINE

## 2025-06-03 ENCOUNTER — OFFICE VISIT (OUTPATIENT)
Dept: INFECTIOUS DISEASES | Facility: CLINIC | Age: 60
End: 2025-06-03
Payer: MEDICAID

## 2025-06-03 VITALS
WEIGHT: 249.8 LBS | TEMPERATURE: 99.1 F | HEART RATE: 93 BPM | DIASTOLIC BLOOD PRESSURE: 69 MMHG | SYSTOLIC BLOOD PRESSURE: 155 MMHG | RESPIRATION RATE: 18 BRPM | BODY MASS INDEX: 35.76 KG/M2 | OXYGEN SATURATION: 97 % | HEIGHT: 70 IN

## 2025-06-03 DIAGNOSIS — I07.9 ENDOCARDITIS OF TRICUSPID VALVE: Primary | ICD-10-CM

## 2025-06-03 DIAGNOSIS — B95.62 MRSA BACTEREMIA: ICD-10-CM

## 2025-06-03 DIAGNOSIS — R78.81 MRSA BACTEREMIA: ICD-10-CM

## 2025-06-03 DIAGNOSIS — K74.69 OTHER CIRRHOSIS OF LIVER: ICD-10-CM

## 2025-06-03 DIAGNOSIS — M00.061 STAPHYLOCOCCAL ARTHRITIS OF RIGHT KNEE: ICD-10-CM

## 2025-06-03 RX ORDER — INSULIN GLARGINE 100 [IU]/ML
10 INJECTION, SOLUTION SUBCUTANEOUS 2 TIMES DAILY
COMMUNITY

## 2025-06-03 NOTE — PROGRESS NOTES
[facility]         Referring Provider: No referring provider defined for this encounter.    Subjective      Chief Complaint  Follow-up (Endocarditis of Tricuspid Valve)    History of Present Illness  Melchor Hardwick III is a 60 y.o. male who presents today to [facility] for infectious disease evaluation and antibiotic management.    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 No fever, no chills and no night sweats. I reviewed labs from last week..     Pain is about the same, no fever, no chills and no erythema, no swelling of the knee. No clinical sepsis. Platelets are running little lower than usual. No fever and no chills.     Taking doxycycline 100 mg one po daily. No adverse reactions. Occasional knee pain with weather changes. No fever, no chills and no night sweats.    Recent 2D echo did not show vegetation but TV was not well visualized.         Past Medical History:   Diagnosis Date    Diabetes mellitus     4 times per day gets checked for sugar at rehab    Dizzy     Hyperlipidemia     Hypertension     Hypothyroid 11/30/2022    MRSA infection     blood -   2022    Orthostatic hypotension     Pressure sore on buttocks     top crack -  sees wound - but now rn take care of it at rehab - minor opening - dime size - pat nurse didnt assess-  pt states getting better    Pyogenic arthritis of right knee joint, due to unspecified organism 09/21/2022    Sepsis     Wears glasses     readers       Past Surgical History:   Procedure Laterality Date    ABSCESS DRAINAGE  2004    PERINEUM    AMPUTATION FOOT Left 05/18/2022    Procedure: AMPUTATION FOOT;  Surgeon: Addison Colby MD;  Location: [facility];  Service: Podiatry;  Laterality: Left;    INCISION AND DRAINAGE LEG Left 05/10/2022    Procedure: INCISION AND DRAINAGE LOWER EXTREMITY;  Surgeon: Addison Colby MD;  Location: [facility] OR;  Service:  Podiatry;  Laterality: Left;    KNEE INCISION AND DRAINAGE Right 09/21/2022    Procedure: KNEE INCISION AND DRAINAGE RIGHT;  Surgeon: Brain Bentley MD;  Location:  [facility] OR;  Service: Orthopedics;  Laterality: Right;    PERIPHERALLY INSERTED CENTRAL CATHETER INSERTION Left     TOTAL KNEE  PROSTHESIS REMOVAL W/ SPACER INSERTION Right 11/30/2022    Procedure: ANTIBIOTIC SPACER PLACEMENT KNEE - RIGHT;  Surgeon: Brain Bentley MD;  Location:  [facility] OR;  Service: Orthopedics;  Laterality: Right;    WOUND DEBRIDEMENT Left 05/13/2022    Procedure: DEBRIDEMENT FOOT;  Surgeon: Addison Colby MD;  Location:  [facility] OR;  Service: Podiatry;  Laterality: Left;       Social History     Socioeconomic History    Marital status: Single   Tobacco Use    Smoking status: Never    Smokeless tobacco: Never   Vaping Use    Vaping status: Never Used   Substance and Sexual Activity    Alcohol use: Never    Drug use: Never    Sexual activity: Defer       Family History  family history is not on file.      There is no immunization history on file for this patient.     Allergies  No Known Allergies    The medication list has been reviewed and updated.   Current Medications    Current Outpatient Medications:     ascorbic acid (VITAMIN C) 500 MG tablet, Take 1 tablet by mouth Daily., Disp: , Rfl:     Diclofenac Sodium (VOLTAREN) 1 % gel gel, Apply 4 g topically to the appropriate area as directed 4 (Four) Times a Day As Needed., Disp: , Rfl:     doxycycline (VIBRAMYICN) 100 MG tablet, Take 1 tablet by mouth Daily., Disp: , Rfl:     Insulin Aspart (novoLOG) 100 UNIT/ML injection, Inject 0-14 Units under the skin into the appropriate area as directed 3 (Three) Times a Day Before Meals., Disp: , Rfl: 12    Insulin Aspart (novoLOG) 100 UNIT/ML injection, Inject 4 Units under the skin into the appropriate area as directed 3 (Three) Times a Day Before Meals., Disp: , Rfl: 12    insulin glargine (LANTUS, SEMGLEE) 100 UNIT/ML injection, Inject 10  Units under the skin into the appropriate area as directed 2 (Two) Times a Day., Disp: , Rfl:     levothyroxine (SYNTHROID, LEVOTHROID) 50 MCG tablet, Take 1 tablet by mouth Daily., Disp: , Rfl:     multivitamin with minerals tablet tablet, Take 1 tablet by mouth Daily., Disp: , Rfl:     pantoprazole (PROTONIX) 40 MG EC tablet, Take 1 tablet by mouth Every Morning., Disp: , Rfl:     acetaminophen (TYLENOL) 325 MG tablet, Take 2 tablets by mouth Every 4 (Four) Hours As Needed for Mild Pain. (Patient not taking: Reported on 6/3/2025), Disp: , Rfl:     bisacodyl (DULCOLAX) 10 MG suppository, Insert 1 suppository into the rectum Daily As Needed for Constipation. (Patient not taking: Reported on 6/3/2025), Disp: , Rfl:     cyclobenzaprine (FLEXERIL) 5 MG tablet, Take 1 tablet by mouth 3 (Three) Times a Day As Needed for Muscle Spasms. (Patient not taking: Reported on 6/3/2025), Disp: , Rfl:     docusate sodium 100 MG capsule, Take 1 capsule by mouth 2 (Two) Times a Day. (Patient not taking: Reported on 6/3/2025), Disp: , Rfl:     fludrocortisone 0.1 MG tablet, Take 0.5 tablets by mouth Daily. (Patient not taking: Reported on 6/3/2025), Disp: , Rfl:     insulin detemir (LEVEMIR) 100 UNIT/ML injection, Inject 17 Units under the skin into the appropriate area as directed Every 12 (Twelve) Hours. (Patient not taking: Reported on 6/3/2025), Disp: , Rfl: 12    meloxicam (MOBIC) 15 MG tablet, Take 1 tablet by mouth Daily. (Patient not taking: Reported on 6/3/2025), Disp: , Rfl:     midodrine (PROAMATINE) 2.5 MG tablet, Take 1 tablet by mouth 3 (Three) Times a Day Before Meals. Hold if SBP is above 120 (Patient not taking: Reported on 6/3/2025), Disp: , Rfl:     polyethylene glycol (MIRALAX) 17 g packet, Take 17 g by mouth Daily. (Patient not taking: Reported on 6/3/2025), Disp: , Rfl:     pregabalin (LYRICA) 100 MG capsule, Take 1 capsule by mouth Every 12 (Twelve) Hours. (Patient not taking: Reported on 6/3/2025), Disp: , Rfl:  "    Vitamins A & D (magic barrier cream), Apply 1 application topically to the appropriate area as directed As Needed (skin irritation). (Patient not taking: Reported on 6/3/2025), Disp: , Rfl:       Review of Systems    Review of Systems   Constitutional:  Negative for activity change, appetite change, chills, diaphoresis and fever.   HENT:  Negative for congestion, dental problem, drooling, mouth sores, sinus pressure and sneezing.    Eyes:  Negative for blurred vision, double vision, photophobia and discharge.   Respiratory:  Negative for apnea, cough, choking, chest tightness, shortness of breath and wheezing.    Cardiovascular:  Negative for chest pain, palpitations and leg swelling.   Gastrointestinal:  Negative for abdominal distention, abdominal pain, diarrhea, nausea and vomiting.   Genitourinary:  Negative for dysuria, flank pain and frequency.   Musculoskeletal:  Positive for arthralgias and gait problem. Negative for neck pain and neck stiffness.   Skin:  Negative for rash.   Neurological:  Negative for dizziness, tremors, seizures, syncope, speech difficulty, numbness, headache and confusion.   Psychiatric/Behavioral:  Negative for agitation, behavioral problems, hallucinations and suicidal ideas.         Objective     Vital Signs:  /69 (BP Location: Right arm, Patient Position: Sitting, Cuff Size: Adult)   Pulse 93   Temp 99.1 °F (37.3 °C) (Infrared)   Resp 18   Ht 177.8 cm (70\")   Wt 113 kg (249 lb 12.8 oz)   SpO2 97%   BMI 35.84 kg/m²   Estimated body mass index is 35.84 kg/m² as calculated from the following:    Height as of this encounter: 177.8 cm (70\").    Weight as of this encounter: 113 kg (249 lb 12.8 oz).    Physical Exam  Constitutional:       General: He is not in acute distress.     Appearance: Normal appearance. He is normal weight. He is not ill-appearing, toxic-appearing or diaphoretic.   HENT:      Head: Normocephalic and atraumatic.      Nose: Nose normal. No congestion " or rhinorrhea.      Mouth/Throat:      Mouth: Mucous membranes are moist.      Pharynx: Oropharynx is clear.   Eyes:      General: No scleral icterus.     Extraocular Movements: Extraocular movements intact.      Pupils: Pupils are equal, round, and reactive to light.   Neck:      Vascular: No carotid bruit.   Cardiovascular:      Rate and Rhythm: Normal rate and regular rhythm.      Pulses: Normal pulses.      Heart sounds: No murmur heard.  Pulmonary:      Effort: Pulmonary effort is normal. No respiratory distress.      Breath sounds: Normal breath sounds. No stridor. No wheezing, rhonchi or rales.   Chest:      Chest wall: No tenderness.   Abdominal:      General: Abdomen is flat. Bowel sounds are normal. There is no distension.      Palpations: Abdomen is soft.      Tenderness: There is no abdominal tenderness. There is no guarding or rebound.   Musculoskeletal:      Cervical back: Normal range of motion and neck supple. No rigidity or tenderness.   Lymphadenopathy:      Cervical: No cervical adenopathy.   Skin:     Coloration: Skin is not jaundiced.      Findings: No lesion or rash.   Neurological:      General: No focal deficit present.      Mental Status: He is alert and oriented to person, place, and time.   Psychiatric:         Mood and Affect: Mood normal.         Behavior: Behavior normal.          Result Review :  The following data was reviewed by Marilynn Giordano MD     Lab Results  Lab Results   Component Value Date    WBC 2.69 (L) 07/11/2024    HGB 13.2 07/11/2024    HCT 40.9 07/11/2024    MCV 85.6 07/11/2024    PLT 44 (C) 07/11/2024     Lab Results   Component Value Date    GLUCOSE 128 (H) 07/11/2024    BUN 12 07/11/2024    CREATININE 0.92 07/11/2024    BCR 13.0 07/11/2024    K 4.2 07/11/2024    CO2 21.0 (L) 07/11/2024    CALCIUM 8.6 07/11/2024    ALBUMIN 3.9 07/11/2024    AST 47 (H) 07/11/2024    ALT 34 07/11/2024      Lab Results   Component Value Date    CRP 0.46 05/29/2025        No results  "found for: \"ACANTHNAEG\", \"AFBCX\", \"BPERTUSSISCX\", \"BLOODCX\"  No results found for: \"BCIDPCR\", \"CXREFLEX\", \"CSFCX\", \"CULTURETIS\"  No results found for: \"CULTURES\", \"HSVCX\", \"URCX\"  No results found for: \"EYECULTURE\", \"GCCX\", \"HSVCULTURE\", \"LABHSV\"  No results found for: \"LEGIONELLA\", \"MRSACX\", \"MUMPSCX\", \"MYCOPLASCX\"  No results found for: \"NOCARDIACX\", \"STOOLCX\"  No results found for: \"THROATCX\", \"UNSTIMCULT\", \"URINECX\", \"CULTURE\", \"VZVCULTUR\"  No results found for: \"VIRALCULTU\", \"WOUNDCX\"    Radiology Results              Assessment / Plan        Diagnoses and all orders for this visit:    1. Endocarditis of tricuspid valve (Primary)  -     C-reactive Protein; Future    2. MRSA bacteremia  -     C-reactive Protein; Future    3. Other cirrhosis of liver  -     C-reactive Protein; Future    4. Staphylococcal arthritis of right knee  -     C-reactive Protein; Future      Patient is doing great with no complaints and no evidence of clinical sepsis. Will follow closely.     Continue with oral lifelong suppressive therapy with doxycycline 100 mg daily that was decreased from bid on 6/6/23.     Consider compression stockings to the right lower extremity.     I discussed sugar control and diet restrictions.     Will follow up in 8 weeks with labs to be drawn on 5/30/25 prior to apt on 6/3/25     2/26/25 TTE       The study is technically difficult for diagnosis especially with regards to tricuspid valve.    Normal left ventricular cavity size and wall thickness noted. All left ventricular wall segments contract normally.    Left ventricular ejection fraction appears to be 61 - 65%.    The aortic valve is not well visualized. The aortic valve is grossly normal in structure. The aortic valve appears trileaflet. Trace to mild aortic valve regurgitation is present.    The mitral valve is structurally normal with no significant stenosis present. Trace to mild mitral valve regurgitation is present.    The pulmonic valve is not " well visualized.    Tricuspid valve not well visualized.    There is no evidence of pericardial effusion.    May consider a transesophageal echocardiographic study to have a better assessment of the tricuspid valve and pulmonic valve if clinically warranted.        CRP checked on 5/29/2025 has finally normalized at 0.46.  No evidence of clinical sepsis and overall patient is doing very well on his oral long-term suppressive therapy.             Follow Up   Return in about 8 weeks (around 7/29/2025) for With Dr. Giordano, Follow up.    Visit Diagnoses:    ICD-10-CM ICD-9-CM   1. Endocarditis of tricuspid valve  I07.9 424.2   2. MRSA bacteremia  R78.81 790.7    B95.62 041.12   3. Other cirrhosis of liver  K74.69 571.5   4. Staphylococcal arthritis of right knee  M00.061 711.06     041.10       Patient was given instructions and counseling regarding his condition or for health maintenance advice. Please see specific information pulled into the AVS if appropriate.     This document has been electronically signed by Marilynn Giordano MD   Crystal 3, 2025 09:10 EDT      No orders of the defined types were placed in this encounter.     Dictated Utilizing Dragon Dictation: Part of this note may be an electronic transcription/translation of spoken language to printed text using the Dragon Dictation System.

## 2025-07-24 ENCOUNTER — LAB (OUTPATIENT)
Dept: LAB | Facility: HOSPITAL | Age: 60
End: 2025-07-24
Payer: MEDICAID

## 2025-07-24 DIAGNOSIS — B95.62 MRSA BACTEREMIA: ICD-10-CM

## 2025-07-24 DIAGNOSIS — R78.81 MRSA BACTEREMIA: ICD-10-CM

## 2025-07-24 DIAGNOSIS — I07.9 ENDOCARDITIS OF TRICUSPID VALVE: ICD-10-CM

## 2025-07-24 DIAGNOSIS — K74.69 OTHER CIRRHOSIS OF LIVER: ICD-10-CM

## 2025-07-24 DIAGNOSIS — M00.061 STAPHYLOCOCCAL ARTHRITIS OF RIGHT KNEE: ICD-10-CM

## 2025-07-24 LAB — CRP SERPL-MCNC: 0.77 MG/DL (ref 0–0.5)

## 2025-07-24 PROCEDURE — 86140 C-REACTIVE PROTEIN: CPT

## 2025-07-24 PROCEDURE — 36415 COLL VENOUS BLD VENIPUNCTURE: CPT

## 2025-07-29 ENCOUNTER — OFFICE VISIT (OUTPATIENT)
Dept: INFECTIOUS DISEASES | Facility: CLINIC | Age: 60
End: 2025-07-29
Payer: MEDICAID

## 2025-07-29 VITALS
TEMPERATURE: 99.3 F | HEART RATE: 85 BPM | OXYGEN SATURATION: 97 % | SYSTOLIC BLOOD PRESSURE: 160 MMHG | DIASTOLIC BLOOD PRESSURE: 75 MMHG | HEIGHT: 70 IN | RESPIRATION RATE: 18 BRPM | BODY MASS INDEX: 35.36 KG/M2 | WEIGHT: 247 LBS

## 2025-07-29 DIAGNOSIS — R78.81 MRSA BACTEREMIA: ICD-10-CM

## 2025-07-29 DIAGNOSIS — I07.9 ENDOCARDITIS OF TRICUSPID VALVE: Primary | ICD-10-CM

## 2025-07-29 DIAGNOSIS — B95.62 MRSA BACTEREMIA: ICD-10-CM

## 2025-07-29 DIAGNOSIS — M00.061 STAPHYLOCOCCAL ARTHRITIS OF RIGHT KNEE: ICD-10-CM

## 2025-07-29 DIAGNOSIS — Z96.651 STATUS POST TOTAL RIGHT KNEE REPLACEMENT: ICD-10-CM

## 2025-07-29 NOTE — PROGRESS NOTES
[facility]         Referring Provider: No referring provider defined for this encounter.    Subjective      Chief Complaint  Follow-up (Endocarditis of tricuspid valve)    History of Present Illness  Melchor Hardwick III is a 60 y.o. male who presents today to [facility] for infectious disease evaluation and antibiotic management.    History of Present Illness  The patient presents for evaluation of knee pain.    He reports intermittent knee pain, which he believes is activity-dependent. He experienced a recent incident where he felt a tweak in his knee, causing discomfort. He does not have any redness or warmth in the area. He also reports no abdominal issues, diarrhea, or fever.        Past Medical History:   Diagnosis Date    Diabetes mellitus     4 times per day gets checked for sugar at rehab    Dizzy     Hyperlipidemia     Hypertension     Hypothyroid 11/30/2022    MRSA infection     blood -   2022    Orthostatic hypotension     Pressure sore on buttocks     top crack -  sees wound - but now rn take care of it at rehab - minor opening - dime size - pat nurse didnt assess-  pt states getting better    Pyogenic arthritis of right knee joint, due to unspecified organism 09/21/2022    Sepsis     Wears glasses     readers       Past Surgical History:   Procedure Laterality Date    ABSCESS DRAINAGE  2004    PERINEUM    AMPUTATION FOOT Left 05/18/2022    Procedure: AMPUTATION FOOT;  Surgeon: Addison Colby MD;  Location: [facility] OR;  Service: Podiatry;  Laterality: Left;    INCISION AND DRAINAGE LEG Left 05/10/2022    Procedure: INCISION AND DRAINAGE LOWER EXTREMITY;  Surgeon: Addison Colby MD;  Location: [facility] OR;  Service: Podiatry;  Laterality: Left;    KNEE INCISION AND DRAINAGE Right 09/21/2022    Procedure: KNEE INCISION AND DRAINAGE RIGHT;  Surgeon: Brain Bentley MD;  Location: [facility] OR;  Service: Orthopedics;  Laterality: Right;    PERIPHERALLY  INSERTED CENTRAL CATHETER INSERTION Left     TOTAL KNEE  PROSTHESIS REMOVAL W/ SPACER INSERTION Right 11/30/2022    Procedure: ANTIBIOTIC SPACER PLACEMENT KNEE - RIGHT;  Surgeon: Brain Bentley MD;  Location:  [facility] OR;  Service: Orthopedics;  Laterality: Right;    WOUND DEBRIDEMENT Left 05/13/2022    Procedure: DEBRIDEMENT FOOT;  Surgeon: Addison Colby MD;  Location:  [facility] OR;  Service: Podiatry;  Laterality: Left;       Social History     Socioeconomic History    Marital status: Single   Tobacco Use    Smoking status: Never    Smokeless tobacco: Never   Vaping Use    Vaping status: Never Used   Substance and Sexual Activity    Alcohol use: Never    Drug use: Never    Sexual activity: Defer       Family History  family history is not on file.      There is no immunization history on file for this patient.     Allergies  No Known Allergies    The medication list has been reviewed and updated.   Current Medications    Current Outpatient Medications:     ascorbic acid (VITAMIN C) 500 MG tablet, Take 1 tablet by mouth Daily., Disp: , Rfl:     Diclofenac Sodium (VOLTAREN) 1 % gel gel, Apply 4 g topically to the appropriate area as directed 4 (Four) Times a Day As Needed., Disp: , Rfl:     doxycycline (VIBRAMYICN) 100 MG tablet, Take 1 tablet by mouth Daily., Disp: , Rfl:     Insulin Aspart (novoLOG) 100 UNIT/ML injection, Inject 0-14 Units under the skin into the appropriate area as directed 3 (Three) Times a Day Before Meals., Disp: , Rfl: 12    insulin glargine (LANTUS, SEMGLEE) 100 UNIT/ML injection, Inject 10 Units under the skin into the appropriate area as directed 2 (Two) Times a Day., Disp: , Rfl:     levothyroxine (SYNTHROID, LEVOTHROID) 50 MCG tablet, Take 1 tablet by mouth Daily., Disp: , Rfl:     multivitamin with minerals tablet tablet, Take 1 tablet by mouth Daily., Disp: , Rfl:     acetaminophen (TYLENOL) 325 MG tablet, Take 2 tablets by mouth Every 4 (Four) Hours As Needed for Mild Pain. (Patient  not taking: Reported on 7/29/2025), Disp: , Rfl:     bisacodyl (DULCOLAX) 10 MG suppository, Insert 1 suppository into the rectum Daily As Needed for Constipation. (Patient not taking: Reported on 7/29/2025), Disp: , Rfl:     cyclobenzaprine (FLEXERIL) 5 MG tablet, Take 1 tablet by mouth 3 (Three) Times a Day As Needed for Muscle Spasms. (Patient not taking: Reported on 7/29/2025), Disp: , Rfl:     docusate sodium 100 MG capsule, Take 1 capsule by mouth 2 (Two) Times a Day. (Patient not taking: Reported on 7/29/2025), Disp: , Rfl:     fludrocortisone 0.1 MG tablet, Take 0.5 tablets by mouth Daily. (Patient not taking: Reported on 7/29/2025), Disp: , Rfl:     Insulin Aspart (novoLOG) 100 UNIT/ML injection, Inject 4 Units under the skin into the appropriate area as directed 3 (Three) Times a Day Before Meals. (Patient not taking: Reported on 7/29/2025), Disp: , Rfl: 12    insulin detemir (LEVEMIR) 100 UNIT/ML injection, Inject 17 Units under the skin into the appropriate area as directed Every 12 (Twelve) Hours. (Patient not taking: Reported on 7/29/2025), Disp: , Rfl: 12    meloxicam (MOBIC) 15 MG tablet, Take 1 tablet by mouth Daily. (Patient not taking: Reported on 7/29/2025), Disp: , Rfl:     midodrine (PROAMATINE) 2.5 MG tablet, Take 1 tablet by mouth 3 (Three) Times a Day Before Meals. Hold if SBP is above 120 (Patient not taking: Reported on 7/29/2025), Disp: , Rfl:     pantoprazole (PROTONIX) 40 MG EC tablet, Take 1 tablet by mouth Every Morning. (Patient not taking: Reported on 7/29/2025), Disp: , Rfl:     polyethylene glycol (MIRALAX) 17 g packet, Take 17 g by mouth Daily. (Patient not taking: Reported on 7/29/2025), Disp: , Rfl:     pregabalin (LYRICA) 100 MG capsule, Take 1 capsule by mouth Every 12 (Twelve) Hours. (Patient not taking: Reported on 7/29/2025), Disp: , Rfl:     Vitamins A & D (magic barrier cream), Apply 1 application topically to the appropriate area as directed As Needed (skin irritation).  "(Patient not taking: Reported on 7/29/2025), Disp: , Rfl:       Review of Systems    Review of Systems     Objective     Vital Signs:  /75 (BP Location: Left arm, Patient Position: Sitting, Cuff Size: Adult)   Pulse 85   Temp 99.3 °F (37.4 °C) (Infrared)   Resp 18   Ht 177.8 cm (70\")   Wt 112 kg (247 lb)   SpO2 97%   BMI 35.44 kg/m²   Estimated body mass index is 35.44 kg/m² as calculated from the following:    Height as of this encounter: 177.8 cm (70\").    Weight as of this encounter: 112 kg (247 lb).    Physical Exam     Physical Exam  Heart: Regular rhythm, no murmur  Lungs: Clear to auscultation bilaterally  Extremities: Less swollen knee, no redness or warmth       Result Review :  The following data was reviewed by Marilynn Giordano MD     Results  Labs   - CRP: 07/24/2025, 0.77   - Hemoglobin A1c: 8.5%    Lab Results  Lab Results   Component Value Date    WBC 2.69 (L) 07/11/2024    HGB 13.2 07/11/2024    HCT 40.9 07/11/2024    MCV 85.6 07/11/2024    PLT 44 (C) 07/11/2024     Lab Results   Component Value Date    GLUCOSE 128 (H) 07/11/2024    BUN 12 07/11/2024    CREATININE 0.92 07/11/2024    BCR 13.0 07/11/2024    K 4.2 07/11/2024    CO2 21.0 (L) 07/11/2024    CALCIUM 8.6 07/11/2024    ALBUMIN 3.9 07/11/2024    AST 47 (H) 07/11/2024    ALT 34 07/11/2024      Lab Results   Component Value Date    CRP 0.77 (H) 07/24/2025        No results found for: \"ACANTHNAEG\", \"AFBCX\", \"BPERTUSSISCX\", \"BLOODCX\"  No results found for: \"BCIDPCR\", \"CXREFLEX\", \"CSFCX\", \"CULTURETIS\"  No results found for: \"CULTURES\", \"HSVCX\", \"URCX\"  No results found for: \"EYECULTURE\", \"GCCX\", \"HSVCULTURE\", \"LABHSV\"  No results found for: \"LEGIONELLA\", \"MRSACX\", \"MUMPSCX\", \"MYCOPLASCX\"  No results found for: \"NOCARDIACX\", \"STOOLCX\"  No results found for: \"THROATCX\", \"UNSTIMCULT\", \"URINECX\", \"CULTURE\", \"VZVCULTUR\"  No results found for: \"VIRALCULTU\", \"WOUNDCX\"    Radiology Results              Assessment / Plan        Diagnoses and " all orders for this visit:    1. Endocarditis of tricuspid valve (Primary)  -     Cancel: C-reactive Protein; Future  -     C-reactive Protein; Future    2. MRSA bacteremia  -     Cancel: C-reactive Protein; Future  -     C-reactive Protein; Future    3. Staphylococcal arthritis of right knee  -     Cancel: C-reactive Protein; Future  -     C-reactive Protein; Future    4. Status post total right knee replacement  -     Cancel: C-reactive Protein; Future  -     C-reactive Protein; Future        Assessment & Plan  1. Knee pain.  The patient reports that his knee pain fluctuates based on his activities. There is no redness or warmth in the knee, indicating no signs of infection or acute inflammation. He mentioned tweaking his knee recently, which caused discomfort. He is advised to be cautious with his activities to avoid further injury.    2. Elevated hemoglobin A1c.  The patient's hemoglobin A1c remains elevated at 8.5%, indicating poor blood sugar control. He is advised to manage his blood sugar levels more effectively to improve his long-term health outcomes. The importance of maintaining a lower hemoglobin A1c was discussed, emphasizing the benefits of reducing the risk of diabetes-related complications.    Follow-up  The patient will follow up on 09/23/2025 with labs to be done on 09/19/2025.        Follow Up   Return in about 8 weeks (around 9/23/2025) for Follow up, With Dr. Giordano.    Visit Diagnoses:    ICD-10-CM ICD-9-CM   1. Endocarditis of tricuspid valve  I07.9 424.2   2. MRSA bacteremia  R78.81 790.7    B95.62 041.12   3. Staphylococcal arthritis of right knee  M00.061 711.06     041.10   4. Status post total right knee replacement  Z96.651 V43.65       Patient was given instructions and counseling regarding his condition or for health maintenance advice. Please see specific information pulled into the AVS if appropriate.     This document has been electronically signed by Marilynn Giordano MD   July  29, 2025 09:00 EDT      No orders of the defined types were placed in this encounter.       Patient or patient representative verbalized consent for the use of Ambient Listening during the visit with  Marilynn Giordano MD for chart documentation. 7/29/2025  09:00 EDT    Dictated Utilizing Dragon Dictation: Part of this note may be an electronic transcription/translation of spoken language to printed text using the Dragon Dictation System.

## (undated) DEVICE — SUT VIC 3/0 SH 27IN J416H

## (undated) DEVICE — SUT ETHLN 4/0 PS2 18IN 1667H

## (undated) DEVICE — HANDPIECE SET WITH COAXIAL HIGH FLOW TIP AND SUCTION TUBE: Brand: INTERPULSE

## (undated) DEVICE — UNDERGLV SURG BIOGEL INDICAT PI SZ8.5 BLU

## (undated) DEVICE — PK EXTREM LOWR 70

## (undated) DEVICE — VERSAJET II HYDROSURGERY SYSTEM HANDSET, 45DEG 14MM EXACT: Brand: VERSAJET

## (undated) DEVICE — SYS PUMP PREVENA125 INCSN MNGT PP/DRSNG 45ML 1P/U

## (undated) DEVICE — CONMED REFLEX RHS SINGLE USE, RELOADABLE, ROTATING HEAD SKIN STAPLER: Brand: CONMED REFLEX

## (undated) DEVICE — SWABSTK SKINPREP PVPI PRE/SAT 8IN STRL

## (undated) DEVICE — DISPOSABLE TOURNIQUET CUFF SINGLE BLADDER, DUAL PORT AND QUICK CONNECT CONNECTOR: Brand: COLOR CUFF

## (undated) DEVICE — KNEE IMMOBILIZER: Brand: DEROYAL

## (undated) DEVICE — BNDG ELAS CO-FLEX SLF ADHR 4IN5YD LF STRL

## (undated) DEVICE — PREMIUM DRY TRAY LF: Brand: MEDLINE INDUSTRIES, INC.

## (undated) DEVICE — ANTIBACTERIAL UNDYED BRAIDED (POLYGLACTIN 910), SYNTHETIC ABSORBABLE SUTURE: Brand: COATED VICRYL

## (undated) DEVICE — DRSNG PAD ABD 8X10IN STRL

## (undated) DEVICE — HOLDER: Brand: DEROYAL

## (undated) DEVICE — ADHS LIQ MASTISOL 2/3ML

## (undated) DEVICE — NDL HYPO ECLPS SFTY 18G 1 1/2IN

## (undated) DEVICE — SHEET, DRAPE, SPLIT, STERILE: Brand: MEDLINE

## (undated) DEVICE — SCRB SURG BACTOSHIELD CHG 4PCT 4OZ

## (undated) DEVICE — MARKER,SKIN,W/RULER,DUAL,STOP: Brand: MEDLINE

## (undated) DEVICE — SUT ETHLN 3/0 PS2 18 IN 1669H

## (undated) DEVICE — DRSNG WND GZ CURAD OIL EMULSION 3X16IN LF STRL

## (undated) DEVICE — CYSTO/BLADDER IRRIGATION SET, REGULATING CLAMP

## (undated) DEVICE — GLV SURG SENSICARE PI MIC PF SZ9 LF STRL

## (undated) DEVICE — VERSAJET II HYDROSURGERY SYSTEM HANDSET, 45DEG 8MM EXACT: Brand: VERSAJET

## (undated) DEVICE — PATIENT RETURN ELECTRODE, SINGLE-USE, CONTACT QUALITY MONITORING, ADULT, WITH 9FT CORD, FOR PATIENTS WEIGING OVER 33LBS. (15KG): Brand: MEGADYNE

## (undated) DEVICE — KT EVAC WND 3SPRNG 400CC W/DRN RND 3/16IN

## (undated) DEVICE — VESSEL LOOPS X-RAY DETECTABLE: Brand: DEROYAL

## (undated) DEVICE — TBG PENCL TELESCP MEGADYNE SMOKE EVAC 10FT

## (undated) DEVICE — DRAPE,U/ SHT,SPLIT,PLAS,STERIL: Brand: MEDLINE

## (undated) DEVICE — STERILE PVP: Brand: MEDLINE INDUSTRIES, INC.

## (undated) DEVICE — PIN HOLD TEMP NOHEAD FLUT 1/8X3.5IN

## (undated) DEVICE — GLV SURG SENSICARE PI ORTHO SZ8 LF STRL

## (undated) DEVICE — THIN OFFSET (9.0 X 0.38 X 25.0MM)

## (undated) DEVICE — TUBING, SUCTION, 1/4" X 20', STRAIGHT: Brand: MEDLINE INDUSTRIES, INC.

## (undated) DEVICE — 3M™ STERI-STRIP™ REINFORCED ADHESIVE SKIN CLOSURES, R1547, 1/2 IN X 4 IN (12 MM X 100 MM), 6 STRIPS/ENVELOPE: Brand: 3M™ STERI-STRIP™

## (undated) DEVICE — FEMORAL CANAL TIP, IRRIGATION/SUCTION

## (undated) DEVICE — SUT PDS MONO 0 36 CT1 VIL PDP346H

## (undated) DEVICE — DRSNG SURESITE WNDW 4X4.5

## (undated) DEVICE — BNDG ELAS W/CLIP 6IN 10YD LF STRL

## (undated) DEVICE — PAD ARMBRD SURG CONVOL 7.5X20X2IN

## (undated) DEVICE — UNDERCAST PADDING: Brand: DEROYAL

## (undated) DEVICE — PROXIMATE RH ROTATING HEAD SKIN STAPLERS (35 WIDE) CONTAINS 35 STAINLESS STEEL STAPLES: Brand: PROXIMATE

## (undated) DEVICE — SUT PDS 2 0 CT1 27IN CLR Z259H

## (undated) DEVICE — BOWL UTIL STRL 32OZ

## (undated) DEVICE — GLV SURG SENSICARE PI ORTHO SZ8.5 LF STRL

## (undated) DEVICE — DRSNG GZ CURAD XEROFORM PETROLTM OVERWRP 1X8IN STRL

## (undated) DEVICE — BLD TONG INDIV/WRP A/ 6IN STRL

## (undated) DEVICE — PRECISION THIN (9.0 X 0.38 X 31.0MM)

## (undated) DEVICE — SPLNT ORTHOGLASS 4INX15FT

## (undated) DEVICE — INTENDED FOR TISSUE SEPARATION, AND OTHER PROCEDURES THAT REQUIRE A SHARP SURGICAL BLADE TO PUNCTURE OR CUT.: Brand: BARD-PARKER ® STAINLESS STEEL BLADES

## (undated) DEVICE — GLV SURG SENSICARE PI ORTHO SZ7.5 LF STRL

## (undated) DEVICE — TRY EPID SFTY 18G 3.5IN 1T7680

## (undated) DEVICE — SPNG LAP PREWSH SFTPK 18X18IN STRL PK/5

## (undated) DEVICE — TRY SKINPREP PVP SCRB W PAINT

## (undated) DEVICE — STRYKER PERFORMANCE SERIES SAGITTAL BLADE: Brand: STRYKER PERFORMANCE SERIES

## (undated) DEVICE — 4-PORT MANIFOLD: Brand: NEPTUNE 2

## (undated) DEVICE — BNDG ELAS CO-FLEX SLF ADHR 6IN 5YD LF STRL

## (undated) DEVICE — SHEET,DRAPE,53X77,STERILE: Brand: MEDLINE

## (undated) DEVICE — SUT ETHLN 4/0 PS2 PLSTC 1667G

## (undated) DEVICE — APPL CHLORAPREP TINTED 26ML TEAL

## (undated) DEVICE — KT DRN EVAC WND PVC PCH WTROC RND 10F400

## (undated) DEVICE — SOL HYDROGEN PEROX 3PCT 4OZ

## (undated) DEVICE — CONTAINER,SPECIMEN,OR STERILE,4OZ: Brand: MEDLINE

## (undated) DEVICE — KT PUMP INFUBLOCK MDL 2100 PMKITSOLIS

## (undated) DEVICE — SUT ETHLN 3/0 FS1 663G

## (undated) DEVICE — BLANKT WARM UPPR/BDY ARM/OUT 57X196CM

## (undated) DEVICE — COLLECTION AND TRANSPORT SWAB MICROORGANISM COLLECTION AND TRANSPORT SYSTEM: Brand: CARDINAL HEALTH

## (undated) DEVICE — SPNG GZ WOVN 4X4IN 12PLY 10/BX STRL

## (undated) DEVICE — UNDYED BRAIDED (POLYGLACTIN 910), SYNTHETIC ABSORBABLE SUTURE: Brand: COATED VICRYL

## (undated) DEVICE — PK KN TOTL 10

## (undated) DEVICE — DRSNG GZ PETROLTM XEROFORM CURAD 1X8IN STRL

## (undated) DEVICE — SOL ISO/ALC RUB 70PCT 4OZ

## (undated) DEVICE — DISPOSABLE TOURNIQUET CUFF SINGLE BLADDER, SINGLE PORT AND QUICK CONNECT CONNECTOR: Brand: COLOR CUFF